# Patient Record
Sex: MALE | Race: WHITE | NOT HISPANIC OR LATINO | Employment: OTHER | URBAN - METROPOLITAN AREA
[De-identification: names, ages, dates, MRNs, and addresses within clinical notes are randomized per-mention and may not be internally consistent; named-entity substitution may affect disease eponyms.]

---

## 2021-05-15 ENCOUNTER — APPOINTMENT (OUTPATIENT)
Dept: GENERAL RADIOLOGY | Facility: CLINIC | Age: 71
DRG: 003 | End: 2021-05-15
Attending: STUDENT IN AN ORGANIZED HEALTH CARE EDUCATION/TRAINING PROGRAM
Payer: COMMERCIAL

## 2021-05-15 ENCOUNTER — APPOINTMENT (OUTPATIENT)
Dept: INTERVENTIONAL RADIOLOGY/VASCULAR | Facility: CLINIC | Age: 71
DRG: 003 | End: 2021-05-15
Attending: NEUROLOGICAL SURGERY
Payer: COMMERCIAL

## 2021-05-15 ENCOUNTER — APPOINTMENT (OUTPATIENT)
Dept: CT IMAGING | Facility: CLINIC | Age: 71
DRG: 003 | End: 2021-05-15
Attending: NEUROLOGICAL SURGERY
Payer: COMMERCIAL

## 2021-05-15 ENCOUNTER — ANESTHESIA EVENT (OUTPATIENT)
Dept: SURGERY | Facility: CLINIC | Age: 71
DRG: 003 | End: 2021-05-15
Payer: COMMERCIAL

## 2021-05-15 ENCOUNTER — APPOINTMENT (OUTPATIENT)
Dept: CT IMAGING | Facility: CLINIC | Age: 71
End: 2021-05-15
Attending: EMERGENCY MEDICINE
Payer: COMMERCIAL

## 2021-05-15 ENCOUNTER — ANESTHESIA (OUTPATIENT)
Dept: SURGERY | Facility: CLINIC | Age: 71
DRG: 003 | End: 2021-05-15
Payer: COMMERCIAL

## 2021-05-15 ENCOUNTER — HOSPITAL ENCOUNTER (EMERGENCY)
Facility: CLINIC | Age: 71
Discharge: ANOTHER HOSPITAL | End: 2021-05-15
Attending: EMERGENCY MEDICINE | Admitting: EMERGENCY MEDICINE
Payer: COMMERCIAL

## 2021-05-15 ENCOUNTER — APPOINTMENT (OUTPATIENT)
Dept: GENERAL RADIOLOGY | Facility: CLINIC | Age: 71
End: 2021-05-15
Attending: EMERGENCY MEDICINE
Payer: COMMERCIAL

## 2021-05-15 ENCOUNTER — HOSPITAL ENCOUNTER (INPATIENT)
Facility: CLINIC | Age: 71
LOS: 27 days | Discharge: NURSING, REHAB OR LONG-TERM CARE | DRG: 003 | End: 2021-06-11
Attending: NEUROLOGICAL SURGERY | Admitting: NEUROLOGICAL SURGERY
Payer: COMMERCIAL

## 2021-05-15 VITALS
BODY MASS INDEX: 22.63 KG/M2 | DIASTOLIC BLOOD PRESSURE: 77 MMHG | OXYGEN SATURATION: 100 % | RESPIRATION RATE: 30 BRPM | WEIGHT: 163.14 LBS | SYSTOLIC BLOOD PRESSURE: 122 MMHG | HEART RATE: 60 BPM

## 2021-05-15 DIAGNOSIS — I21.4 NSTEMI (NON-ST ELEVATED MYOCARDIAL INFARCTION) (H): ICD-10-CM

## 2021-05-15 DIAGNOSIS — R41.89 UNRESPONSIVENESS: ICD-10-CM

## 2021-05-15 DIAGNOSIS — I60.9 SUBARACHNOID HEMORRHAGE (H): ICD-10-CM

## 2021-05-15 DIAGNOSIS — E87.6 HYPOKALEMIA: ICD-10-CM

## 2021-05-15 DIAGNOSIS — I67.1 INTRACRANIAL ANEURYSM: ICD-10-CM

## 2021-05-15 DIAGNOSIS — Z99.11 VENTILATOR DEPENDENCE (H): Primary | ICD-10-CM

## 2021-05-15 DIAGNOSIS — I60.8 SUBARACHNOID HEMORRHAGE DUE TO RUPTURED ANEURYSM (H): ICD-10-CM

## 2021-05-15 DIAGNOSIS — Z99.11 VENTILATOR DEPENDENCE (H): ICD-10-CM

## 2021-05-15 DIAGNOSIS — I16.1 HYPERTENSIVE EMERGENCY: ICD-10-CM

## 2021-05-15 LAB
ABO + RH BLD: NORMAL
ABO + RH BLD: NORMAL
ALBUMIN SERPL-MCNC: 3 G/DL (ref 3.4–5)
ALBUMIN SERPL-MCNC: 3.7 G/DL (ref 3.4–5)
ALP SERPL-CCNC: 61 U/L (ref 40–150)
ALP SERPL-CCNC: 76 U/L (ref 40–150)
ALT SERPL W P-5'-P-CCNC: 16 U/L (ref 0–70)
ALT SERPL W P-5'-P-CCNC: 20 U/L (ref 0–70)
ANION GAP SERPL CALCULATED.3IONS-SCNC: 5 MMOL/L (ref 3–14)
ANION GAP SERPL CALCULATED.3IONS-SCNC: 6 MMOL/L (ref 3–14)
ANION GAP SERPL CALCULATED.3IONS-SCNC: 6 MMOL/L (ref 3–14)
ANION GAP SERPL CALCULATED.3IONS-SCNC: 8 MMOL/L (ref 3–14)
APTT PPP: 27 SEC (ref 22–37)
APTT PPP: 29 SEC (ref 22–37)
APTT PPP: 30 SEC (ref 22–37)
AST SERPL W P-5'-P-CCNC: 19 U/L (ref 0–45)
AST SERPL W P-5'-P-CCNC: 26 U/L (ref 0–45)
BASE DEFICIT BLDA-SCNC: 1.8 MMOL/L
BASE EXCESS BLDA CALC-SCNC: 0 MMOL/L
BASOPHILS # BLD AUTO: 0.1 10E9/L (ref 0–0.2)
BASOPHILS NFR BLD AUTO: 0.8 %
BILIRUB SERPL-MCNC: 0.2 MG/DL (ref 0.2–1.3)
BILIRUB SERPL-MCNC: 0.3 MG/DL (ref 0.2–1.3)
BLD GP AB SCN SERPL QL: NORMAL
BLD PROD TYP BPU: NORMAL
BLD UNIT ID BPU: 0
BLD UNIT ID BPU: 0
BLOOD BANK CMNT PATIENT-IMP: NORMAL
BLOOD PRODUCT CODE: NORMAL
BLOOD PRODUCT CODE: NORMAL
BPU ID: NORMAL
BPU ID: NORMAL
BUN SERPL-MCNC: 14 MG/DL (ref 7–30)
BUN SERPL-MCNC: 15 MG/DL (ref 7–30)
BUN SERPL-MCNC: 16 MG/DL (ref 7–30)
BUN SERPL-MCNC: 18 MG/DL (ref 7–30)
CA-I BLD-MCNC: 4.8 MG/DL (ref 4.4–5.2)
CALCIUM SERPL-MCNC: 7.4 MG/DL (ref 8.5–10.1)
CALCIUM SERPL-MCNC: 8.3 MG/DL (ref 8.5–10.1)
CALCIUM SERPL-MCNC: 8.8 MG/DL (ref 8.5–10.1)
CALCIUM SERPL-MCNC: 9.1 MG/DL (ref 8.5–10.1)
CHLORIDE SERPL-SCNC: 103 MMOL/L (ref 94–109)
CHLORIDE SERPL-SCNC: 104 MMOL/L (ref 94–109)
CHLORIDE SERPL-SCNC: 109 MMOL/L (ref 94–109)
CHLORIDE SERPL-SCNC: 113 MMOL/L (ref 94–109)
CO2 SERPL-SCNC: 22 MMOL/L (ref 20–32)
CO2 SERPL-SCNC: 24 MMOL/L (ref 20–32)
CO2 SERPL-SCNC: 27 MMOL/L (ref 20–32)
CO2 SERPL-SCNC: 28 MMOL/L (ref 20–32)
CREAT SERPL-MCNC: 0.78 MG/DL (ref 0.66–1.25)
CREAT SERPL-MCNC: 0.99 MG/DL (ref 0.66–1.25)
DIFFERENTIAL METHOD BLD: NORMAL
EOSINOPHIL # BLD AUTO: 0.2 10E9/L (ref 0–0.7)
EOSINOPHIL NFR BLD AUTO: 1.7 %
ERYTHROCYTE [DISTWIDTH] IN BLOOD BY AUTOMATED COUNT: 12 % (ref 10–15)
ERYTHROCYTE [DISTWIDTH] IN BLOOD BY AUTOMATED COUNT: 12.2 % (ref 10–15)
ERYTHROCYTE [DISTWIDTH] IN BLOOD BY AUTOMATED COUNT: 12.5 % (ref 10–15)
ETHANOL SERPL-MCNC: <0.01 G/DL
FIBRINOGEN PPP-MCNC: 417 MG/DL (ref 200–420)
GFR SERPL CREATININE-BSD FRML MDRD: 78 ML/MIN/{1.73_M2}
GFR SERPL CREATININE-BSD FRML MDRD: >90 ML/MIN/{1.73_M2}
GLUCOSE BLD-MCNC: 125 MG/DL (ref 70–99)
GLUCOSE BLDC GLUCOMTR-MCNC: 152 MG/DL (ref 70–99)
GLUCOSE SERPL-MCNC: 142 MG/DL (ref 70–99)
GLUCOSE SERPL-MCNC: 156 MG/DL (ref 70–99)
GLUCOSE SERPL-MCNC: 177 MG/DL (ref 70–99)
GLUCOSE SERPL-MCNC: 215 MG/DL (ref 70–99)
HCO3 BLD-SCNC: 23 MMOL/L (ref 21–28)
HCO3 BLD-SCNC: 23 MMOL/L (ref 21–28)
HCT VFR BLD AUTO: 35.5 % (ref 40–53)
HCT VFR BLD AUTO: 40 % (ref 40–53)
HCT VFR BLD AUTO: 43.5 % (ref 40–53)
HGB BLD-MCNC: 11.8 G/DL (ref 13.3–17.7)
HGB BLD-MCNC: 13.1 G/DL (ref 13.3–17.7)
HGB BLD-MCNC: 13.6 G/DL (ref 13.3–17.7)
HGB BLD-MCNC: 14.5 G/DL (ref 13.3–17.7)
IMM GRANULOCYTES # BLD: 0.2 10E9/L (ref 0–0.4)
IMM GRANULOCYTES NFR BLD: 1.7 %
INR PPP: 1.07 (ref 0.86–1.14)
INR PPP: 1.16 (ref 0.86–1.14)
INR PPP: 1.2 (ref 0.86–1.14)
INTERPRETATION ECG - MUSE: NORMAL
LABORATORY COMMENT REPORT: NORMAL
LACTATE BLD-SCNC: 1.6 MMOL/L (ref 0.7–2)
LDLC SERPL DIRECT ASSAY-MCNC: 125 MG/DL
LYMPHOCYTES # BLD AUTO: 3.1 10E9/L (ref 0.8–5.3)
LYMPHOCYTES NFR BLD AUTO: 31.6 %
MAGNESIUM SERPL-MCNC: 2 MG/DL (ref 1.6–2.3)
MCH RBC QN AUTO: 30.6 PG (ref 26.5–33)
MCH RBC QN AUTO: 30.7 PG (ref 26.5–33)
MCH RBC QN AUTO: 31.1 PG (ref 26.5–33)
MCHC RBC AUTO-ENTMCNC: 33.2 G/DL (ref 31.5–36.5)
MCHC RBC AUTO-ENTMCNC: 33.3 G/DL (ref 31.5–36.5)
MCHC RBC AUTO-ENTMCNC: 34 G/DL (ref 31.5–36.5)
MCV RBC AUTO: 90 FL (ref 78–100)
MCV RBC AUTO: 92 FL (ref 78–100)
MCV RBC AUTO: 93 FL (ref 78–100)
MONOCYTES # BLD AUTO: 0.5 10E9/L (ref 0–1.3)
MONOCYTES NFR BLD AUTO: 5.1 %
NEUTROPHILS # BLD AUTO: 5.8 10E9/L (ref 1.6–8.3)
NEUTROPHILS NFR BLD AUTO: 59.1 %
NRBC # BLD AUTO: 0 10*3/UL
NRBC BLD AUTO-RTO: 0 /100
NUM BPU REQUESTED: 6
O2/TOTAL GAS SETTING VFR VENT: 40 %
O2/TOTAL GAS SETTING VFR VENT: 60 %
OXYHGB MFR BLD: 98 % (ref 92–100)
PCO2 BLD: 33 MM HG (ref 35–45)
PCO2 BLD: 38 MM HG (ref 35–45)
PH BLD: 7.39 PH (ref 7.35–7.45)
PH BLD: 7.46 PH (ref 7.35–7.45)
PHOSPHATE SERPL-MCNC: 1.4 MG/DL (ref 2.5–4.5)
PLATELET # BLD AUTO: 239 10E9/L (ref 150–450)
PLATELET # BLD AUTO: 257 10E9/L (ref 150–450)
PLATELET # BLD AUTO: 285 10E9/L (ref 150–450)
PO2 BLD: 145 MM HG (ref 80–105)
PO2 BLD: 269 MM HG (ref 80–105)
POTASSIUM BLD-SCNC: 3.7 MMOL/L (ref 3.4–5.3)
POTASSIUM SERPL-SCNC: 3.2 MMOL/L (ref 3.4–5.3)
POTASSIUM SERPL-SCNC: 3.2 MMOL/L (ref 3.4–5.3)
POTASSIUM SERPL-SCNC: 3.8 MMOL/L (ref 3.4–5.3)
POTASSIUM SERPL-SCNC: 4.1 MMOL/L (ref 3.4–5.3)
PROT SERPL-MCNC: 6.2 G/DL (ref 6.8–8.8)
PROT SERPL-MCNC: 7.5 G/DL (ref 6.8–8.8)
RADIOLOGIST FLAGS: ABNORMAL
RBC # BLD AUTO: 3.85 10E12/L (ref 4.4–5.9)
RBC # BLD AUTO: 4.43 10E12/L (ref 4.4–5.9)
RBC # BLD AUTO: 4.66 10E12/L (ref 4.4–5.9)
SARS-COV-2 RNA RESP QL NAA+PROBE: NEGATIVE
SODIUM BLD-SCNC: 144 MMOL/L (ref 133–144)
SODIUM SERPL-SCNC: 131 MMOL/L (ref 133–144)
SODIUM SERPL-SCNC: 139 MMOL/L (ref 133–144)
SODIUM SERPL-SCNC: 140 MMOL/L (ref 133–144)
SODIUM SERPL-SCNC: 146 MMOL/L (ref 133–144)
SPECIMEN EXP DATE BLD: NORMAL
SPECIMEN SOURCE: NORMAL
T4 FREE SERPL-MCNC: 1.23 NG/DL (ref 0.76–1.46)
TRANSFUSION STATUS PATIENT QL: NORMAL
TROPONIN I SERPL-MCNC: 0.1 UG/L (ref 0–0.04)
TSH SERPL DL<=0.005 MIU/L-ACNC: 5.36 MU/L (ref 0.4–4)
WBC # BLD AUTO: 12.3 10E9/L (ref 4–11)
WBC # BLD AUTO: 16.5 10E9/L (ref 4–11)
WBC # BLD AUTO: 9.8 10E9/L (ref 4–11)

## 2021-05-15 PROCEDURE — 70450 CT HEAD/BRAIN W/O DYE: CPT | Mod: XE

## 2021-05-15 PROCEDURE — 82803 BLOOD GASES ANY COMBINATION: CPT | Performed by: STUDENT IN AN ORGANIZED HEALTH CARE EDUCATION/TRAINING PROGRAM

## 2021-05-15 PROCEDURE — 272N000001 HC OR GENERAL SUPPLY STERILE: Performed by: NEUROLOGICAL SURGERY

## 2021-05-15 PROCEDURE — 84100 ASSAY OF PHOSPHORUS: CPT | Performed by: STUDENT IN AN ORGANIZED HEALTH CARE EDUCATION/TRAINING PROGRAM

## 2021-05-15 PROCEDURE — 999N000185 HC STATISTIC TRANSPORT TIME EA 15 MIN

## 2021-05-15 PROCEDURE — 83721 ASSAY OF BLOOD LIPOPROTEIN: CPT | Performed by: STUDENT IN AN ORGANIZED HEALTH CARE EDUCATION/TRAINING PROGRAM

## 2021-05-15 PROCEDURE — 99291 CRITICAL CARE FIRST HOUR: CPT | Mod: 25

## 2021-05-15 PROCEDURE — 96365 THER/PROPH/DIAG IV INF INIT: CPT | Mod: 59

## 2021-05-15 PROCEDURE — 272N000566 HC SHEATH CR3

## 2021-05-15 PROCEDURE — 84439 ASSAY OF FREE THYROXINE: CPT | Performed by: STUDENT IN AN ORGANIZED HEALTH CARE EDUCATION/TRAINING PROGRAM

## 2021-05-15 PROCEDURE — 272N000506 HC NEEDLE CR6

## 2021-05-15 PROCEDURE — 36556 INSERT NON-TUNNEL CV CATH: CPT | Mod: GC | Performed by: PSYCHIATRY & NEUROLOGY

## 2021-05-15 PROCEDURE — 250N000024 HC ISOFLURANE, PER MIN: Performed by: NEUROLOGICAL SURGERY

## 2021-05-15 PROCEDURE — 74018 RADEX ABDOMEN 1 VIEW: CPT | Mod: 26 | Performed by: RADIOLOGY

## 2021-05-15 PROCEDURE — 96375 TX/PRO/DX INJ NEW DRUG ADDON: CPT | Mod: 59

## 2021-05-15 PROCEDURE — 99291 CRITICAL CARE FIRST HOUR: CPT | Mod: GC | Performed by: PSYCHIATRY & NEUROLOGY

## 2021-05-15 PROCEDURE — 999N000065 XR CHEST PORT 1 VIEW

## 2021-05-15 PROCEDURE — B3161ZZ FLUOROSCOPY OF RIGHT INTERNAL CAROTID ARTERY USING LOW OSMOLAR CONTRAST: ICD-10-PCS | Performed by: NEUROLOGICAL SURGERY

## 2021-05-15 PROCEDURE — 82803 BLOOD GASES ANY COMBINATION: CPT

## 2021-05-15 PROCEDURE — 009630Z DRAINAGE OF CEREBRAL VENTRICLE WITH DRAINAGE DEVICE, PERCUTANEOUS APPROACH: ICD-10-PCS | Performed by: NEUROLOGICAL SURGERY

## 2021-05-15 PROCEDURE — 36224 PLACE CATH CAROTD ART: CPT | Mod: RT | Performed by: NEUROLOGICAL SURGERY

## 2021-05-15 PROCEDURE — B3151ZZ FLUOROSCOPY OF BILATERAL COMMON CAROTID ARTERIES USING LOW OSMOLAR CONTRAST: ICD-10-PCS | Performed by: NEUROLOGICAL SURGERY

## 2021-05-15 PROCEDURE — 93005 ELECTROCARDIOGRAM TRACING: CPT

## 2021-05-15 PROCEDURE — 250N000009 HC RX 250: Performed by: NEUROLOGICAL SURGERY

## 2021-05-15 PROCEDURE — C1763 CONN TISS, NON-HUMAN: HCPCS | Performed by: NEUROLOGICAL SURGERY

## 2021-05-15 PROCEDURE — 36224 PLACE CATH CAROTD ART: CPT | Mod: 50

## 2021-05-15 PROCEDURE — C9803 HOPD COVID-19 SPEC COLLECT: HCPCS

## 2021-05-15 PROCEDURE — 82810 BLOOD GASES O2 SAT ONLY: CPT

## 2021-05-15 PROCEDURE — 84132 ASSAY OF SERUM POTASSIUM: CPT

## 2021-05-15 PROCEDURE — 93010 ELECTROCARDIOGRAM REPORT: CPT | Performed by: INTERNAL MEDICINE

## 2021-05-15 PROCEDURE — 999N000065 XR ABDOMEN 1 VIEW

## 2021-05-15 PROCEDURE — 86923 COMPATIBILITY TEST ELECTRIC: CPT | Performed by: STUDENT IN AN ORGANIZED HEALTH CARE EDUCATION/TRAINING PROGRAM

## 2021-05-15 PROCEDURE — 36223 PLACE CATH CAROTID/INOM ART: CPT | Mod: XS | Performed by: NEUROLOGICAL SURGERY

## 2021-05-15 PROCEDURE — 255N000002 HC RX 255 OP 636: Performed by: NEUROLOGICAL SURGERY

## 2021-05-15 PROCEDURE — 999N000157 HC STATISTIC RCP TIME EA 10 MIN

## 2021-05-15 PROCEDURE — 250N000009 HC RX 250: Performed by: STUDENT IN AN ORGANIZED HEALTH CARE EDUCATION/TRAINING PROGRAM

## 2021-05-15 PROCEDURE — P9016 RBC LEUKOCYTES REDUCED: HCPCS | Performed by: STUDENT IN AN ORGANIZED HEALTH CARE EDUCATION/TRAINING PROGRAM

## 2021-05-15 PROCEDURE — 250N000011 HC RX IP 250 OP 636: Performed by: STUDENT IN AN ORGANIZED HEALTH CARE EDUCATION/TRAINING PROGRAM

## 2021-05-15 PROCEDURE — C1769 GUIDE WIRE: HCPCS | Performed by: NEUROLOGICAL SURGERY

## 2021-05-15 PROCEDURE — B3131ZZ FLUOROSCOPY OF RIGHT COMMON CAROTID ARTERY USING LOW OSMOLAR CONTRAST: ICD-10-PCS | Performed by: NEUROLOGICAL SURGERY

## 2021-05-15 PROCEDURE — 99292 CRITICAL CARE ADDL 30 MIN: CPT | Mod: GC | Performed by: PSYCHIATRY & NEUROLOGY

## 2021-05-15 PROCEDURE — 272N000004 HC RX 272: Performed by: NEUROLOGICAL SURGERY

## 2021-05-15 PROCEDURE — 5A1945Z RESPIRATORY VENTILATION, 24-96 CONSECUTIVE HOURS: ICD-10-PCS | Performed by: NEUROLOGICAL SURGERY

## 2021-05-15 PROCEDURE — 250N000011 HC RX IP 250 OP 636: Performed by: NEUROLOGICAL SURGERY

## 2021-05-15 PROCEDURE — 85610 PROTHROMBIN TIME: CPT | Performed by: EMERGENCY MEDICINE

## 2021-05-15 PROCEDURE — C1769 GUIDE WIRE: HCPCS

## 2021-05-15 PROCEDURE — 36223 PLACE CATH CAROTID/INOM ART: CPT | Mod: XE | Performed by: NEUROLOGICAL SURGERY

## 2021-05-15 PROCEDURE — 51702 INSERT TEMP BLADDER CATH: CPT

## 2021-05-15 PROCEDURE — 85384 FIBRINOGEN ACTIVITY: CPT | Performed by: STUDENT IN AN ORGANIZED HEALTH CARE EDUCATION/TRAINING PROGRAM

## 2021-05-15 PROCEDURE — 84295 ASSAY OF SERUM SODIUM: CPT

## 2021-05-15 PROCEDURE — 80053 COMPREHEN METABOLIC PANEL: CPT | Performed by: STUDENT IN AN ORGANIZED HEALTH CARE EDUCATION/TRAINING PROGRAM

## 2021-05-15 PROCEDURE — 83036 HEMOGLOBIN GLYCOSYLATED A1C: CPT | Performed by: STUDENT IN AN ORGANIZED HEALTH CARE EDUCATION/TRAINING PROGRAM

## 2021-05-15 PROCEDURE — 71045 X-RAY EXAM CHEST 1 VIEW: CPT | Mod: 26 | Performed by: RADIOLOGY

## 2021-05-15 PROCEDURE — 360N000086 HC SURGERY LEVEL 6 W/ FLUORO, PER MIN: Performed by: NEUROLOGICAL SURGERY

## 2021-05-15 PROCEDURE — 82077 ASSAY SPEC XCP UR&BREATH IA: CPT | Performed by: EMERGENCY MEDICINE

## 2021-05-15 PROCEDURE — 70450 CT HEAD/BRAIN W/O DYE: CPT | Mod: XS

## 2021-05-15 PROCEDURE — 250N000009 HC RX 250

## 2021-05-15 PROCEDURE — 99292 CRITICAL CARE ADDL 30 MIN: CPT

## 2021-05-15 PROCEDURE — 85027 COMPLETE CBC AUTOMATED: CPT | Performed by: STUDENT IN AN ORGANIZED HEALTH CARE EDUCATION/TRAINING PROGRAM

## 2021-05-15 PROCEDURE — 94002 VENT MGMT INPAT INIT DAY: CPT

## 2021-05-15 PROCEDURE — 85730 THROMBOPLASTIN TIME PARTIAL: CPT | Performed by: STUDENT IN AN ORGANIZED HEALTH CARE EDUCATION/TRAINING PROGRAM

## 2021-05-15 PROCEDURE — 258N000003 HC RX IP 258 OP 636: Performed by: STUDENT IN AN ORGANIZED HEALTH CARE EDUCATION/TRAINING PROGRAM

## 2021-05-15 PROCEDURE — 03LG0CZ OCCLUSION OF INTRACRANIAL ARTERY WITH EXTRALUMINAL DEVICE, OPEN APPROACH: ICD-10-PCS | Performed by: NEUROLOGICAL SURGERY

## 2021-05-15 PROCEDURE — 36415 COLL VENOUS BLD VENIPUNCTURE: CPT | Performed by: STUDENT IN AN ORGANIZED HEALTH CARE EDUCATION/TRAINING PROGRAM

## 2021-05-15 PROCEDURE — C1887 CATHETER, GUIDING: HCPCS | Performed by: NEUROLOGICAL SURGERY

## 2021-05-15 PROCEDURE — 72125 CT NECK SPINE W/O DYE: CPT

## 2021-05-15 PROCEDURE — 250N000013 HC RX MED GY IP 250 OP 250 PS 637: Performed by: STUDENT IN AN ORGANIZED HEALTH CARE EDUCATION/TRAINING PROGRAM

## 2021-05-15 PROCEDURE — 278N000051 HC OR IMPLANT GENERAL: Performed by: NEUROLOGICAL SURGERY

## 2021-05-15 PROCEDURE — C1760 CLOSURE DEV, VASC: HCPCS

## 2021-05-15 PROCEDURE — 83605 ASSAY OF LACTIC ACID: CPT

## 2021-05-15 PROCEDURE — 84443 ASSAY THYROID STIM HORMONE: CPT | Performed by: STUDENT IN AN ORGANIZED HEALTH CARE EDUCATION/TRAINING PROGRAM

## 2021-05-15 PROCEDURE — C1762 CONN TISS, HUMAN(INC FASCIA): HCPCS | Performed by: NEUROLOGICAL SURGERY

## 2021-05-15 PROCEDURE — 258N000003 HC RX IP 258 OP 636

## 2021-05-15 PROCEDURE — 999N000083 HC STATISTIC IR STAFF TIME IN THE OR: Mod: XS

## 2021-05-15 PROCEDURE — C1713 ANCHOR/SCREW BN/BN,TIS/BN: HCPCS | Performed by: NEUROLOGICAL SURGERY

## 2021-05-15 PROCEDURE — 86901 BLOOD TYPING SEROLOGIC RH(D): CPT | Performed by: STUDENT IN AN ORGANIZED HEALTH CARE EDUCATION/TRAINING PROGRAM

## 2021-05-15 PROCEDURE — 36223 PLACE CATH CAROTID/INOM ART: CPT | Mod: LT

## 2021-05-15 PROCEDURE — 250N000011 HC RX IP 250 OP 636

## 2021-05-15 PROCEDURE — 999N001017 HC STATISTIC GLUCOSE BY METER IP

## 2021-05-15 PROCEDURE — 00C70ZZ EXTIRPATION OF MATTER FROM CEREBRAL HEMISPHERE, OPEN APPROACH: ICD-10-PCS | Performed by: NEUROLOGICAL SURGERY

## 2021-05-15 PROCEDURE — 70450 CT HEAD/BRAIN W/O DYE: CPT | Mod: 26 | Performed by: STUDENT IN AN ORGANIZED HEALTH CARE EDUCATION/TRAINING PROGRAM

## 2021-05-15 PROCEDURE — 85025 COMPLETE CBC W/AUTO DIFF WBC: CPT | Performed by: EMERGENCY MEDICINE

## 2021-05-15 PROCEDURE — 250N000011 HC RX IP 250 OP 636: Performed by: EMERGENCY MEDICINE

## 2021-05-15 PROCEDURE — 370N000017 HC ANESTHESIA TECHNICAL FEE, PER MIN: Performed by: NEUROLOGICAL SURGERY

## 2021-05-15 PROCEDURE — 96368 THER/DIAG CONCURRENT INF: CPT

## 2021-05-15 PROCEDURE — 86900 BLOOD TYPING SEROLOGIC ABO: CPT | Performed by: STUDENT IN AN ORGANIZED HEALTH CARE EDUCATION/TRAINING PROGRAM

## 2021-05-15 PROCEDURE — 200N000002 HC R&B ICU UMMC

## 2021-05-15 PROCEDURE — 86850 RBC ANTIBODY SCREEN: CPT | Performed by: STUDENT IN AN ORGANIZED HEALTH CARE EDUCATION/TRAINING PROGRAM

## 2021-05-15 PROCEDURE — 70496 CT ANGIOGRAPHY HEAD: CPT

## 2021-05-15 PROCEDURE — 87635 SARS-COV-2 COVID-19 AMP PRB: CPT | Performed by: EMERGENCY MEDICINE

## 2021-05-15 PROCEDURE — C1887 CATHETER, GUIDING: HCPCS

## 2021-05-15 PROCEDURE — 82947 ASSAY GLUCOSE BLOOD QUANT: CPT

## 2021-05-15 PROCEDURE — 99291 CRITICAL CARE FIRST HOUR: CPT | Mod: 25 | Performed by: PSYCHIATRY & NEUROLOGY

## 2021-05-15 PROCEDURE — 70450 CT HEAD/BRAIN W/O DYE: CPT | Mod: 76

## 2021-05-15 PROCEDURE — 250N000009 HC RX 250: Performed by: EMERGENCY MEDICINE

## 2021-05-15 PROCEDURE — 83735 ASSAY OF MAGNESIUM: CPT | Performed by: STUDENT IN AN ORGANIZED HEALTH CARE EDUCATION/TRAINING PROGRAM

## 2021-05-15 PROCEDURE — 258N000003 HC RX IP 258 OP 636: Performed by: EMERGENCY MEDICINE

## 2021-05-15 PROCEDURE — 31500 INSERT EMERGENCY AIRWAY: CPT

## 2021-05-15 PROCEDURE — 85610 PROTHROMBIN TIME: CPT | Performed by: STUDENT IN AN ORGANIZED HEALTH CARE EDUCATION/TRAINING PROGRAM

## 2021-05-15 PROCEDURE — 84484 ASSAY OF TROPONIN QUANT: CPT | Performed by: EMERGENCY MEDICINE

## 2021-05-15 PROCEDURE — 82330 ASSAY OF CALCIUM: CPT

## 2021-05-15 PROCEDURE — 85730 THROMBOPLASTIN TIME PARTIAL: CPT | Performed by: EMERGENCY MEDICINE

## 2021-05-15 PROCEDURE — 80048 BASIC METABOLIC PNL TOTAL CA: CPT | Performed by: EMERGENCY MEDICINE

## 2021-05-15 PROCEDURE — 80048 BASIC METABOLIC PNL TOTAL CA: CPT | Performed by: STUDENT IN AN ORGANIZED HEALTH CARE EDUCATION/TRAINING PROGRAM

## 2021-05-15 DEVICE — IMP BUR HOLE COVER 17MM LOW PROFILE TI 421.527: Type: IMPLANTABLE DEVICE | Site: CRANIAL | Status: FUNCTIONAL

## 2021-05-15 DEVICE — GRAFT DURAGEN 2X2" ID220: Type: IMPLANTABLE DEVICE | Site: BRAIN | Status: FUNCTIONAL

## 2021-05-15 DEVICE — IMPLANTABLE DEVICE: Type: IMPLANTABLE DEVICE | Site: BRAIN | Status: FUNCTIONAL

## 2021-05-15 DEVICE — IMP PLATE SYN BOX LOW PROFILE 10X16MM 04H TI 421.521: Type: IMPLANTABLE DEVICE | Site: CRANIAL | Status: FUNCTIONAL

## 2021-05-15 DEVICE — IMP BUR HOLE COVER 24MM LOW PROFILE TI 421.528: Type: IMPLANTABLE DEVICE | Site: CRANIAL | Status: FUNCTIONAL

## 2021-05-15 DEVICE — IMP SCR SYN MATRIX LOW PRO 1.5X04MM SELF DRILL 04.503.104.01: Type: IMPLANTABLE DEVICE | Site: CRANIAL | Status: FUNCTIONAL

## 2021-05-15 DEVICE — GRAFT DUREPAIR DURA MATRIX 3X3" 62105: Type: IMPLANTABLE DEVICE | Site: BRAIN | Status: FUNCTIONAL

## 2021-05-15 RX ORDER — NALOXONE HYDROCHLORIDE 0.4 MG/ML
0.4 INJECTION, SOLUTION INTRAMUSCULAR; INTRAVENOUS; SUBCUTANEOUS
Status: DISCONTINUED | OUTPATIENT
Start: 2021-05-15 | End: 2021-05-15 | Stop reason: HOSPADM

## 2021-05-15 RX ORDER — ONDANSETRON 4 MG/1
4 TABLET, ORALLY DISINTEGRATING ORAL EVERY 6 HOURS PRN
Status: DISCONTINUED | OUTPATIENT
Start: 2021-05-15 | End: 2021-05-16

## 2021-05-15 RX ORDER — LEVETIRACETAM 10 MG/ML
1000 INJECTION INTRAVASCULAR ONCE
Status: COMPLETED | OUTPATIENT
Start: 2021-05-15 | End: 2021-05-15

## 2021-05-15 RX ORDER — LORAZEPAM 2 MG/ML
INJECTION INTRAMUSCULAR
Status: DISCONTINUED
Start: 2021-05-15 | End: 2021-05-15 | Stop reason: HOSPADM

## 2021-05-15 RX ORDER — SODIUM CHLORIDE, SODIUM LACTATE, POTASSIUM CHLORIDE, CALCIUM CHLORIDE 600; 310; 30; 20 MG/100ML; MG/100ML; MG/100ML; MG/100ML
INJECTION, SOLUTION INTRAVENOUS CONTINUOUS PRN
Status: DISCONTINUED | OUTPATIENT
Start: 2021-05-15 | End: 2021-05-15

## 2021-05-15 RX ORDER — NALOXONE HYDROCHLORIDE 0.4 MG/ML
0.2 INJECTION, SOLUTION INTRAMUSCULAR; INTRAVENOUS; SUBCUTANEOUS
Status: DISCONTINUED | OUTPATIENT
Start: 2021-05-15 | End: 2021-05-15 | Stop reason: HOSPADM

## 2021-05-15 RX ORDER — MAGNESIUM HYDROXIDE 1200 MG/15ML
10 LIQUID ORAL
Status: DISCONTINUED | OUTPATIENT
Start: 2021-05-15 | End: 2021-05-15

## 2021-05-15 RX ORDER — HYDRALAZINE HYDROCHLORIDE 20 MG/ML
10 INJECTION INTRAMUSCULAR; INTRAVENOUS
Status: DISCONTINUED | OUTPATIENT
Start: 2021-05-15 | End: 2021-05-16

## 2021-05-15 RX ORDER — LABETALOL HYDROCHLORIDE 5 MG/ML
20 INJECTION, SOLUTION INTRAVENOUS
Status: DISCONTINUED | OUTPATIENT
Start: 2021-05-15 | End: 2021-05-16

## 2021-05-15 RX ORDER — HEPARIN SOD,PORCINE/0.9 % NACL 5K/1000 ML
INTRAVENOUS SOLUTION INTRAVENOUS PRN
Status: DISCONTINUED | OUTPATIENT
Start: 2021-05-15 | End: 2021-05-15 | Stop reason: HOSPADM

## 2021-05-15 RX ORDER — MAGNESIUM HYDROXIDE 1200 MG/15ML
10 LIQUID ORAL
Status: DISCONTINUED | OUTPATIENT
Start: 2021-05-15 | End: 2021-06-05

## 2021-05-15 RX ORDER — FENTANYL CITRATE 50 UG/ML
INJECTION, SOLUTION INTRAMUSCULAR; INTRAVENOUS PRN
Status: DISCONTINUED | OUTPATIENT
Start: 2021-05-15 | End: 2021-05-15

## 2021-05-15 RX ORDER — PROPOFOL 10 MG/ML
5-75 INJECTION, EMULSION INTRAVENOUS CONTINUOUS
Status: DISCONTINUED | OUTPATIENT
Start: 2021-05-15 | End: 2021-05-16

## 2021-05-15 RX ORDER — BUPIVACAINE HYDROCHLORIDE AND EPINEPHRINE 2.5; 5 MG/ML; UG/ML
INJECTION, SOLUTION EPIDURAL; INFILTRATION; INTRACAUDAL; PERINEURAL PRN
Status: DISCONTINUED | OUTPATIENT
Start: 2021-05-15 | End: 2021-05-15 | Stop reason: HOSPADM

## 2021-05-15 RX ORDER — IOPAMIDOL 755 MG/ML
70 INJECTION, SOLUTION INTRAVASCULAR ONCE
Status: COMPLETED | OUTPATIENT
Start: 2021-05-15 | End: 2021-05-15

## 2021-05-15 RX ORDER — ONDANSETRON 2 MG/ML
4 INJECTION INTRAMUSCULAR; INTRAVENOUS EVERY 6 HOURS PRN
Status: DISCONTINUED | OUTPATIENT
Start: 2021-05-15 | End: 2021-05-16

## 2021-05-15 RX ORDER — NIMODIPINE 30 MG/1
60 CAPSULE, LIQUID FILLED ORAL EVERY 4 HOURS
Status: DISCONTINUED | OUTPATIENT
Start: 2021-05-15 | End: 2021-05-15

## 2021-05-15 RX ORDER — MANNITOL 20 G/100ML
400 INJECTION, SOLUTION INTRAVENOUS ONCE
Status: DISCONTINUED | OUTPATIENT
Start: 2021-05-15 | End: 2021-05-15

## 2021-05-15 RX ORDER — POTASSIUM CHLORIDE 7.45 MG/ML
10 INJECTION INTRAVENOUS ONCE
Status: DISCONTINUED | OUTPATIENT
Start: 2021-05-15 | End: 2021-05-15 | Stop reason: HOSPADM

## 2021-05-15 RX ORDER — LABETALOL HYDROCHLORIDE 5 MG/ML
20 INJECTION, SOLUTION INTRAVENOUS ONCE
Status: COMPLETED | OUTPATIENT
Start: 2021-05-15 | End: 2021-05-15

## 2021-05-15 RX ORDER — FLUMAZENIL 0.1 MG/ML
0.2 INJECTION, SOLUTION INTRAVENOUS
Status: DISCONTINUED | OUTPATIENT
Start: 2021-05-15 | End: 2021-05-15 | Stop reason: HOSPADM

## 2021-05-15 RX ORDER — IODIXANOL 320 MG/ML
INJECTION, SOLUTION INTRAVASCULAR PRN
Status: DISCONTINUED | OUTPATIENT
Start: 2021-05-15 | End: 2021-05-15 | Stop reason: HOSPADM

## 2021-05-15 RX ORDER — PROPOFOL 10 MG/ML
100 INJECTION, EMULSION INTRAVENOUS ONCE
Status: COMPLETED | OUTPATIENT
Start: 2021-05-15 | End: 2021-05-15

## 2021-05-15 RX ORDER — LEVETIRACETAM 100 MG/ML
2000 SOLUTION ORAL ONCE
Status: DISCONTINUED | OUTPATIENT
Start: 2021-05-15 | End: 2021-05-15

## 2021-05-15 RX ORDER — ONDANSETRON 2 MG/ML
4 INJECTION INTRAMUSCULAR; INTRAVENOUS
Status: DISCONTINUED | OUTPATIENT
Start: 2021-05-15 | End: 2021-05-15 | Stop reason: HOSPADM

## 2021-05-15 RX ORDER — FENTANYL CITRATE 50 UG/ML
25-50 INJECTION, SOLUTION INTRAMUSCULAR; INTRAVENOUS EVERY 5 MIN PRN
Status: DISCONTINUED | OUTPATIENT
Start: 2021-05-15 | End: 2021-05-15 | Stop reason: HOSPADM

## 2021-05-15 RX ORDER — IODIXANOL 320 MG/ML
150 INJECTION, SOLUTION INTRAVASCULAR ONCE
Status: COMPLETED | OUTPATIENT
Start: 2021-05-15 | End: 2021-05-15

## 2021-05-15 RX ORDER — PROPOFOL 10 MG/ML
40 INJECTION, EMULSION INTRAVENOUS CONTINUOUS
Status: DISCONTINUED | OUTPATIENT
Start: 2021-05-15 | End: 2021-05-15 | Stop reason: HOSPADM

## 2021-05-15 RX ORDER — PROPOFOL 10 MG/ML
INJECTION, EMULSION INTRAVENOUS PRN
Status: DISCONTINUED | OUTPATIENT
Start: 2021-05-15 | End: 2021-05-15

## 2021-05-15 RX ORDER — LEVETIRACETAM 5 MG/ML
500 INJECTION INTRAVASCULAR EVERY 12 HOURS
Status: DISCONTINUED | OUTPATIENT
Start: 2021-05-15 | End: 2021-05-15

## 2021-05-15 RX ORDER — NIMODIPINE 30 MG/1
30 CAPSULE, LIQUID FILLED ORAL
Status: DISCONTINUED | OUTPATIENT
Start: 2021-05-15 | End: 2021-05-15

## 2021-05-15 RX ORDER — MANNITOL 20 G/100ML
100 INJECTION, SOLUTION INTRAVENOUS ONCE
Status: COMPLETED | OUTPATIENT
Start: 2021-05-15 | End: 2021-05-15

## 2021-05-15 RX ORDER — CEFAZOLIN SODIUM 1 G/3ML
INJECTION, POWDER, FOR SOLUTION INTRAMUSCULAR; INTRAVENOUS PRN
Status: DISCONTINUED | OUTPATIENT
Start: 2021-05-15 | End: 2021-05-15

## 2021-05-15 RX ORDER — HYDRALAZINE HYDROCHLORIDE 20 MG/ML
20 INJECTION INTRAMUSCULAR; INTRAVENOUS ONCE
Status: COMPLETED | OUTPATIENT
Start: 2021-05-15 | End: 2021-05-15

## 2021-05-15 RX ORDER — HYDRALAZINE HYDROCHLORIDE 20 MG/ML
10 INJECTION INTRAMUSCULAR; INTRAVENOUS ONCE
Status: DISCONTINUED | OUTPATIENT
Start: 2021-05-15 | End: 2021-05-15

## 2021-05-15 RX ORDER — HEPARIN SODIUM 200 [USP'U]/100ML
1 INJECTION, SOLUTION INTRAVENOUS CONTINUOUS PRN
Status: DISCONTINUED | OUTPATIENT
Start: 2021-05-15 | End: 2021-05-15 | Stop reason: HOSPADM

## 2021-05-15 RX ORDER — SODIUM CHLORIDE, SODIUM GLUCONATE, SODIUM ACETATE, POTASSIUM CHLORIDE AND MAGNESIUM CHLORIDE 526; 502; 368; 37; 30 MG/100ML; MG/100ML; MG/100ML; MG/100ML; MG/100ML
50 INJECTION, SOLUTION INTRAVENOUS CONTINUOUS
Status: DISCONTINUED | OUTPATIENT
Start: 2021-05-15 | End: 2021-05-18

## 2021-05-15 RX ORDER — NICARDIPINE HYDROCHLORIDE 0.2 MG/ML
2.5-15 INJECTION INTRAVENOUS CONTINUOUS
Status: DISCONTINUED | OUTPATIENT
Start: 2021-05-15 | End: 2021-05-15 | Stop reason: HOSPADM

## 2021-05-15 RX ORDER — SODIUM CHLORIDE 9 MG/ML
INJECTION, SOLUTION INTRAVENOUS CONTINUOUS
Status: DISCONTINUED | OUTPATIENT
Start: 2021-05-15 | End: 2021-05-15

## 2021-05-15 RX ADMIN — IODIXANOL 40 ML: 320 INJECTION, SOLUTION INTRAVASCULAR at 15:02

## 2021-05-15 RX ADMIN — LIDOCAINE HYDROCHLORIDE 4 ML: 10 INJECTION, SOLUTION EPIDURAL; INFILTRATION; INTRACAUDAL; PERINEURAL at 14:25

## 2021-05-15 RX ADMIN — SODIUM CHLORIDE 1000 ML: 9 INJECTION, SOLUTION INTRAVENOUS at 09:15

## 2021-05-15 RX ADMIN — SODIUM CHLORIDE, POTASSIUM CHLORIDE, SODIUM LACTATE AND CALCIUM CHLORIDE: 600; 310; 30; 20 INJECTION, SOLUTION INTRAVENOUS at 15:04

## 2021-05-15 RX ADMIN — LEVETIRACETAM 1000 MG: 10 INJECTION INTRAVENOUS at 09:30

## 2021-05-15 RX ADMIN — CEFAZOLIN 1 G: 1 INJECTION, POWDER, FOR SOLUTION INTRAMUSCULAR; INTRAVENOUS at 17:05

## 2021-05-15 RX ADMIN — PROPOFOL 20 MCG/KG/MIN: 10 INJECTION, EMULSION INTRAVENOUS at 22:53

## 2021-05-15 RX ADMIN — SUGAMMADEX 200 MG: 100 INJECTION, SOLUTION INTRAVENOUS at 20:44

## 2021-05-15 RX ADMIN — PHENYLEPHRINE HYDROCHLORIDE 200 MCG: 10 INJECTION INTRAVENOUS at 15:40

## 2021-05-15 RX ADMIN — PROPOFOL 50 MG: 10 INJECTION, EMULSION INTRAVENOUS at 15:04

## 2021-05-15 RX ADMIN — FENTANYL CITRATE 50 MCG: 50 INJECTION, SOLUTION INTRAMUSCULAR; INTRAVENOUS at 18:26

## 2021-05-15 RX ADMIN — PROPOFOL 40 MCG/KG/MIN: 10 INJECTION, EMULSION INTRAVENOUS at 09:20

## 2021-05-15 RX ADMIN — FENTANYL CITRATE 150 MCG: 50 INJECTION, SOLUTION INTRAMUSCULAR; INTRAVENOUS at 18:52

## 2021-05-15 RX ADMIN — FENTANYL CITRATE 100 MCG: 50 INJECTION, SOLUTION INTRAMUSCULAR; INTRAVENOUS at 16:34

## 2021-05-15 RX ADMIN — ROCURONIUM BROMIDE 20 MG: 10 INJECTION INTRAVENOUS at 17:08

## 2021-05-15 RX ADMIN — ROCURONIUM BROMIDE 20 MG: 10 INJECTION INTRAVENOUS at 17:35

## 2021-05-15 RX ADMIN — FENTANYL CITRATE 50 MCG: 50 INJECTION, SOLUTION INTRAMUSCULAR; INTRAVENOUS at 15:22

## 2021-05-15 RX ADMIN — PHENYLEPHRINE HYDROCHLORIDE 50 MCG: 10 INJECTION INTRAVENOUS at 16:04

## 2021-05-15 RX ADMIN — SODIUM CHLORIDE, SODIUM GLUCONATE, SODIUM ACETATE, POTASSIUM CHLORIDE AND MAGNESIUM CHLORIDE 75 ML/HR: 526; 502; 368; 37; 30 INJECTION, SOLUTION INTRAVENOUS at 12:56

## 2021-05-15 RX ADMIN — FENTANYL CITRATE 50 MCG: 50 INJECTION, SOLUTION INTRAMUSCULAR; INTRAVENOUS at 18:28

## 2021-05-15 RX ADMIN — NIMODIPINE 60 MG: 30 SOLUTION ORAL at 21:53

## 2021-05-15 RX ADMIN — PHENYLEPHRINE HYDROCHLORIDE 100 MCG: 10 INJECTION INTRAVENOUS at 16:49

## 2021-05-15 RX ADMIN — MANNITOL 100 G: 20 INJECTION, SOLUTION INTRAVENOUS at 09:26

## 2021-05-15 RX ADMIN — LEVETIRACETAM 1000 MG: 10 INJECTION INTRAVENOUS at 16:20

## 2021-05-15 RX ADMIN — PROPOFOL 100 MG/HR: 10 INJECTION, EMULSION INTRAVENOUS at 09:16

## 2021-05-15 RX ADMIN — NICARDIPINE HYDROCHLORIDE 10 MG/HR: 0.2 INJECTION INTRAVENOUS at 10:06

## 2021-05-15 RX ADMIN — PHENYLEPHRINE HYDROCHLORIDE 50 MCG: 10 INJECTION INTRAVENOUS at 18:57

## 2021-05-15 RX ADMIN — PROPOFOL 50 MG: 10 INJECTION, EMULSION INTRAVENOUS at 15:27

## 2021-05-15 RX ADMIN — ROCURONIUM BROMIDE 20 MG: 10 INJECTION INTRAVENOUS at 19:50

## 2021-05-15 RX ADMIN — LABETALOL HYDROCHLORIDE 20 MG: 5 INJECTION, SOLUTION INTRAVENOUS at 09:26

## 2021-05-15 RX ADMIN — IOPAMIDOL 70 ML: 755 INJECTION, SOLUTION INTRAVENOUS at 09:42

## 2021-05-15 RX ADMIN — ROCURONIUM BROMIDE 20 MG: 10 INJECTION INTRAVENOUS at 15:56

## 2021-05-15 RX ADMIN — PHENYLEPHRINE HYDROCHLORIDE 100 MCG: 10 INJECTION INTRAVENOUS at 19:03

## 2021-05-15 RX ADMIN — CEFAZOLIN 1 G: 1 INJECTION, POWDER, FOR SOLUTION INTRAMUSCULAR; INTRAVENOUS at 19:31

## 2021-05-15 RX ADMIN — SODIUM CHLORIDE, POTASSIUM CHLORIDE, SODIUM LACTATE AND CALCIUM CHLORIDE: 600; 310; 30; 20 INJECTION, SOLUTION INTRAVENOUS at 19:38

## 2021-05-15 RX ADMIN — PHENYLEPHRINE HYDROCHLORIDE 100 MCG: 10 INJECTION INTRAVENOUS at 16:41

## 2021-05-15 RX ADMIN — Medication 10 ML: at 21:54

## 2021-05-15 RX ADMIN — SODIUM CHLORIDE 1000 ML: 9 INJECTION, SOLUTION INTRAVENOUS at 10:13

## 2021-05-15 RX ADMIN — HEPARIN SODIUM 4 BAG: 200 INJECTION, SOLUTION INTRAVENOUS at 14:24

## 2021-05-15 RX ADMIN — CEFAZOLIN 2 G: 1 INJECTION, POWDER, FOR SOLUTION INTRAMUSCULAR; INTRAVENOUS at 15:14

## 2021-05-15 RX ADMIN — PHENYLEPHRINE HYDROCHLORIDE 100 MCG: 10 INJECTION INTRAVENOUS at 19:08

## 2021-05-15 RX ADMIN — ROCURONIUM BROMIDE 50 MG: 10 INJECTION INTRAVENOUS at 15:04

## 2021-05-15 RX ADMIN — FENTANYL CITRATE 50 MCG: 50 INJECTION, SOLUTION INTRAMUSCULAR; INTRAVENOUS at 15:04

## 2021-05-15 RX ADMIN — HYDRALAZINE HYDROCHLORIDE 20 MG: 20 INJECTION INTRAMUSCULAR; INTRAVENOUS at 10:10

## 2021-05-15 RX ADMIN — PHENYLEPHRINE HYDROCHLORIDE 50 MCG: 10 INJECTION INTRAVENOUS at 16:06

## 2021-05-15 RX ADMIN — PROPOFOL 40 MCG/KG/MIN: 10 INJECTION, EMULSION INTRAVENOUS at 12:57

## 2021-05-15 RX ADMIN — ROCURONIUM BROMIDE 20 MG: 10 INJECTION INTRAVENOUS at 16:36

## 2021-05-15 RX ADMIN — Medication 50 MCG/HR: at 12:58

## 2021-05-15 RX ADMIN — FENTANYL CITRATE 50 MCG: 50 INJECTION, SOLUTION INTRAMUSCULAR; INTRAVENOUS at 15:27

## 2021-05-15 ASSESSMENT — ENCOUNTER SYMPTOMS: SEIZURES: 1

## 2021-05-15 ASSESSMENT — VISUAL ACUITY
OU: NOT TESTABLE

## 2021-05-15 ASSESSMENT — MIFFLIN-ST. JEOR: SCORE: 1482.13

## 2021-05-15 NOTE — PROCEDURES
[facility]     Endovascular Surgical Neuroradiology Post-Procedure Note    Pre-Procedure Diagnosis:  SAH, ruptured aneurysm  Post-Procedure Diagnosis:  same    Procedure(s):   Diagnostic cervicocerebral angiography    Findings:      Confirmation of rutpured pcomm aneurysm    Plan:      - to OR    Primary Surgeon:  Dr. Jamin Martinez  Fellow:  Riky  Additional Assistants:  Karen    Prior to the start of the procedure and with procedural staff participation, I verbally confirmed: the patient s identity using two indicators, relevant allergies, that the procedure was appropriate and matched the consent or emergent situation, and that the correct equipment/implants were available. Immediately prior to starting the procedure I conducted the Time Out with the procedural staff and re-confirmed the patient s name, procedure, and site/side. (The Joint Commission universal protocol was followed.)  Yes    PRU value: Not applicable    Anesthesia:  Conscious Sedation  Medications:  Fentanyl, Midazolam  Puncture site:  Right Femoral Artery    Fluoroscopy time (minutes):  14.5  Radiation dose (mGy):  442  Contrast amount (mL): 40    Estimated blood loss (mL):  minimal    Closure: retained sheath    Disposition:  neurosurgery      Sedation Post-Procedure Summary    Sedatives: fentanyl propofol continued drips    Vital signs and pulse oximetry were monitored and remained stable throughout the procedure, and sedation was maintained until the procedure was complete.  The patient was monitored by staff until sedation discharge criteria were met.    Patient tolerance:  Patient tolerated the procedure well with no immediate complications.    Time of sedation in minutes:33 minutes from beginning to end of physician one to one monitoring.    Katelyn Gómez MD  Pager:  [phone]

## 2021-05-15 NOTE — PROGRESS NOTES
[facility]     Endovascular Surgical Neuroradiology Pre-Procedure Note      HPI:  Dayron Neri is a 70 year old male with PMH of HTN transferred to [facility] after he initially presented to [facility] on 5/15/2021 after being found unresponsive by his wife.  EMS initially recorded systolic blood pressures in 280s mmHg, and the patient was witnessed to have seizures on route to the hospital.  On arrival at the outside ER, he was noted to have pupillary asymmetry, with the right pupil 3 mm and fixed on the left to being 2 mm and reactive.  The patient was intubated for airway protection and was treated for suspected high ICP (mannitol 100 g x2, hyperventilated, HOB elevated).  Subsequently, his pupils were noted to have improved, recommend 2 mm and reactive bilaterally.  A subsequent head CT showed a right communicating segment multilobulated ICA aneurysm, distal to the origin of the posterior communicating artery.  The patient was transferred to [facility] for further management.       Medical History:  Reviewed    Surgical History:  Reviewed    Family History:  Reviewed    Social History:  Reviewed    Allergies:  Reviewed    Is there a contrast allergy?  No    Medications:  I have reviewed this patient's current medications.    ROS:  Review of systems not obtained due to patient factors - intubation    PHYSICAL EXAMINATION  Vital Signs:  B/P: 118/80,  T: 94.6,  P: 82,  R: 18    Cardio:  RRR  Pulmonary:  on mechanical ventilation  Abdomen:  soft, non-distended    Neurologic  Mental Status:  Unable to assess, intubated  Cranial Nerves:  Pupils are bilaterally 2 mm sluggishly reactive.  Motor:  no abnormal movements, normal tone throughout, normal muscle bulk  Sensory:  Unable to assess, intubated/sedated  Coordination: Deferred    Pre-procedure National Institutes of Health Stroke Scale:   Not applicable    LABS  (most recent Cr, BUN, GFR, PLT, INR, PTT within the past 7  days):  Recent Labs   Lab 05/15/21  1222 05/15/21  0930   CR PENDING  --    BUN PENDING  --    GFRESTIMATED PENDING  --    GFRESTBLACK PENDING  --      --    INR  --  1.07   PTT  --  27        Platelet Function P2Y12 (PRU):  Not applicable      ASSESSMENT: Ruptured right communicating segment ICA aneurysm    PLAN: Diagnostic cerebral angiogram to assess possible endovascular/surgical treatment options        PRE-PROCEDURE SEDATION ASSESSMENT     Pre-Procedure Sedation Assessment done at 1200.    Expected Level:  Deep Sedation    Indication:  Sedation is required to allow for neurointerventional procedure.    Consent obtained from spouse after discussing the risks, benefits and alternatives.     PO Intake:  Appropriately NPO for procedure    ASA Class:  Class 3 - SEVERE SYSTEMIC DISEASE, DEFINITE FUNCTIONAL LIMITATIONS.    Mallampati: Already Intubated   History and physical reviewed and no updates needed. I have reviewed the lab findings, diagnostic data, medications, and the plan for sedation. I have determined this patient to be an appropriate candidate for the planned sedation and procedure and have reassessed the patient IMMEDIATELY PRIOR to sedation and procedure.    Patient was discussed with the Attending, Dr. Martinez, who agrees with the plan.    JULIO CÉSAR MOMIN MD

## 2021-05-15 NOTE — PRE-PROCEDURE
Neurosurgery Attending Preop Note:    Planned Procedure: Right craniotomy, clipping of PCOM aneurysm, evacuation of intracerebral hematoma, intraoperative angiogram.    Indication: Dayron is a 70 y.o. who presented to [facility] with an acute SAH and right ICH.  H/H grade 5.  On presentation his right pupil was blown.  This responded to hyperventillation and mannitol.  A CTA demonstrated a right PCOM aneurysm which is likely the source of the hemorrhage.  He was immediately transferred to the [facility] for emergent ventriculostomy.      I have reviewed the films myself and with my partner Dr. Maurer.  We both agree that the source of the hemorrhage is the right PCOM aneurysm.  Because of the hematoma we feel that the best treatment option is a craniotomy, evacuation of the hematoma and clipping of the aneurysm.  To better appreciate the anatomy of the aneurysm prior to clipping we will perform a cerebral angiogram and then take him directly to the operating room.    Discussion of risks and benefits.  I have personally met Dayron's wife and have described the situation to her.  She understands that this is a life threatening situation and he may not survive this hospitalization.  I described to her the SAH and ICH and then discussed treatment options including coiling vs craniotomy and clipping.  I have discussed risks and benefits of both of these.  She is in agreement with our plan for craniotomy and clipping of aneurysm.  Regarding this procedure I have discussed with her the risks including stroke, additional hemorrhage, paralysis, loss of speech, vision and death.  She is aware of these risks and has signed an informed consent.

## 2021-05-15 NOTE — PROGRESS NOTES
"SPIRITUAL HEALTH SERVICES  [facility] 4A  ON-CALL VISIT     REFERRAL SOURCE: Initial on-call  visit with patient's spouse at bedside, per request for hospital  visit as noted in initial nursing assessment.     Pt was about to go to IR emergently, then will go directly to OR for surgery. Pt's spouse requested Sikh anointing for pt; I informed her that   is on-call but not in-house, she said \"tomorrow would be fine\". Spouse requested prayer before pt left room - we shared prayer and blessing together.     PLAN: will refer to   for anointing on Sunday.     Loi Wise M.Div. (Bill), [facility]  Staff   Pager [phone]     * [facility] remains available 24/7 for emergent requests/referrals, either by having the switchboard page the on-call  or by entering an ASAP/STAT consult in Epic (this will also page the on-call ). Routine Epic consults receive an initial response within 24 hours.*                                                                                          "

## 2021-05-15 NOTE — PROCEDURES
Neurosurgery Ventriculostomy Procedure Note  Date of Procedure: 05/15/2021  Pre procedure Diagnosis: SAH with IVH  Post procedure Diagnosis: SAH with IVH  Consent: Emergent   Anesthesia: Sedation: Propofol (patient already intubated)  Time Out Performed: Yes  Procedure: Left sided External ventricular drain Placement.  Nursing staff entered this procedure into the [facility] Quality Improvement Database    Details of the Procedure:  - Bed was positioned for unencumbered access for sterile procedure  - Patient was supine with head in the neutral position. Left side of the head was widely shaved using clippers.  - All staff wore face masks and hats.  - The patient was on propofol gtt  - Antibiotics were given pre-procedure.  - Anatomical landmarks were used for defining the trajectory and entry point for the ventriculostomy. The incision was marked 11 cm behind the nasion and 3 cm away from the midline.  - A hibiclenz scrub was performed  - The site was then prepped and draped in the standard sterile fashion with full body draping.  - Using a # 15 blade a 1-2 cm incision was made.  - A hand drill was used to make a kwame hole.  - A bactiseal ventricular catheter with a stylet was passed aiming towards the ipsilateral medial canthus and midway between the ipsilateral tragus and lateral canthus.  - When the loss of resistance was felt as the catheter entered the ventricle and CSF was obtained, the stylet was withdrawn and the catheter was furthered another cm. CSF came out under pressure. A cap was applied and opening pressure was obtained.  - A tunneler was passed from the incision >5 cm behind and the catheter was attached to the blunt end and tunneled out a distance from the incision.   - The catheter was then attached to the [place] drainage system and connected to the monitor. Opening pressure was 15 mm Hg.  - Incision was closed using 3/0 Nylon.  - The drain was secured at the exit site using 3-0 nylon suture.  - Drain was  secured using staples.  - Bioseal disk was placed at the exit site of the catheter  - The site was covered with sterile primapore dressing  - Patient tolerated the procedure well.  - The drain was leveled and set at 20 cmH2O.    Monitoring of CSF will continue with samples collected every 5 days.    Dr. Martinez was immediately available for the procedure.    Levi Ferriswajacob Cleaning

## 2021-05-15 NOTE — CONSULTS
[facility]  NeuroCritical Care Consult Note    Patient Name:  Dayron Neri  MRN:  [id number]    :  1950  Date of Service:  05/15/2021  Primary care provider:  Carolyn Moe      Reason for Consult: Asked by Dr. Martinez to see Dayron Neri for Neurocritical care co-management    History of Present Illness:   Dayron Neri is a 70 year old man with PMH of HTN and depression who presents for aneurysmal subarachnoid hemorrhage with significant AMS.  Neuro critical care was consulted for critical care comanagement.    Patient is unable to give a history due to significant encephalopathy.  History is obtained from chart review and EMS.    Reportedly, at 0800 today (5/15/2021) patient's wife heard him fall in the bathroom and found him unresponsive on the ground.  After a few minutes of trying to wake him up she called 911 and EMS.  On arrival to outside hospital he was found to have SBP greater than 200 with a fixed and dilated right pupil.  He was given mannitol and hypertonic's at this time and pupil became equal.  CT scan was performed which showed profound subarachnoid hemorrhage with some intraparenchymal hemorrhage of right frontal lobe.  He was transferred to [facility] for DSA, neurosurgery, and further interventions.      ROS: A 10-point ROS was performed as per HPI.    PMH:  Past Medical History:   Diagnosis Date     Displacement of lumbar intervertebral disc without myelopathy     Lumbar disc rupture, no surgery     Past Surgical History:   Procedure Laterality Date     NO HISTORY OF SURGERY         Allergies:  No Known Allergies    Medications:      Current Facility-Administered Medications:      bupivacaine 0.25 % - EPINEPHrine 1:200,000 (PF) injection, , , PRN, Jamin Martinez MD, 20 mL at 05/15/21 1515     [Auto Hold] fentaNYL (SUBLIMAZE) 50 mcg/mL bolus from infusion pump 25 mcg, 25 mcg, Intravenous, Q1H PRN, Adrian Paredes MD     fentaNYL  (SUBLIMAZE) infusion,  mcg/hr, Intravenous, Continuous, Adrian Paredes MD, Last Rate: 1 mL/hr at 05/15/21 1258, 50 mcg/hr at 05/15/21 1258     gelatin adsorbable sponge, , , PRN, Jamin Martinez MD, 1 each at 05/15/21 1650     hemostatic matrix (SURGIFLO) kit, , , PRN, Jamin Martinez MD, 1 kit at 05/15/21 1649     [Auto Hold] hydrALAZINE (APRESOLINE) injection 10 mg, 10 mg, Intravenous, Q1H PRN, Adrian Paredes MD     [Auto Hold] labetalol (NORMODYNE/TRANDATE) injection 20 mg, 20 mg, Intravenous, Q1H PRN, Adrian Paredes MD     niCARdipine 40 mg in 200 mL 0.83% NaCl (CARDENE) infusion, 2.5-15 mg/hr, Intravenous, Continuous, Adrian Paredes MD     [Auto Hold] niMODipine (NYMALIZE) 6 MG/ML solution 60 mg, 60 mg, Oral or Feeding Tube, Q4H MARAH **AND** [Auto Hold] NS oral flush for nimodipine, 10 mL, Oral or Feeding Tube, Q4H MARAH, Adrian Paredes MD     Plasma-Lyte A (electrolyte A) solution, 75 mL/hr, Intravenous, Continuous, Adrian Paredes MD, Last Rate: 75 mL/hr at 05/15/21 1256, 75 mL/hr at 05/15/21 1256     propofol (DIPRIVAN) infusion, 5-75 mcg/kg/min, Intravenous, Continuous, Adrian Paredes MD, Last Rate: 26.6 mL/hr at 05/15/21 1410, 60 mcg/kg/min at 05/15/21 1410     thrombin (Recombinant) 5000 units vial, , , PRN, Jamin Martinez MD, 5,000 Units at 05/15/21 1650    Facility-Administered Medications Ordered in Other Encounters:      ceFAZolin (ANCEF) 1 g vial to attach to  ml bag for ADULT or 50 ml bag for PEDS, , , PRN, Luis Alberto Whittaker MD, 1 g at 05/15/21 1705     fentaNYL (PF) (SUBLIMAZE) injection, , Intravenous, PRN, Luis Alberto Whittaker MD, 100 mcg at 05/15/21 1634     lactated ringers infusion, , Intravenous, Continuous PRN, Luis Alberto Whittaker MD, New Bag at 05/15/21 1504     phenylephrine (JOSE A-SYNEPHRINE) injection, , Intravenous, Continuous PRN, Jamin Kohli MD, 100 mcg at 05/15/21 1649     propofol (DIPRIVAN) injection 10 mg/mL vial, , Intravenous, PRN,  Luis Alberto Whittaker MD, 50 mg at 05/15/21 1527     rocuronium injection, , Intravenous, PRN, Luis Alberto Whittaker MD, 20 mg at 05/15/21 1708    Social History:  Social History     Tobacco Use     Smoking status: Never Smoker     Smokeless tobacco: Never Used   Substance Use Topics     Alcohol use: Yes     Comment: moderate use       Family History:    Family History   Problem Relation Age of Onset     Hypertension Mother      Cancer Mother         colon cancer @ 85     Diabetes Father         type 2 diabetes     Cancer Father         lung cancer     Hypertension Sister      Psychotic Disorder Sister         depression     Hypertension Sister      Hypertension Sister      Hypertension Sister        Physical Examination:   /80   Pulse 70   Temp 99.5  F (37.5  C)   Resp 29   SpO2 100%     General: mildly sedated on propofol, some mild tremulousness throughout and quite encephalopathic  HEENT: Normocephalic, atraumatic, no epistaxis   Resp: Intubated   Cardio: RRR, S1/S2 appropriate   Abdomen: Soft, non-distended   Extremities: Warm and well perfused, peripheral pulses present, no edema  Skin: Not jaundiced, no rash   Neuro:  Mental status: Somnolent, quite difficult to arouse but possibly follows simple commands on 1 or 2 occasions.  Cranial nerves: Eyes conjugate, pupils 3 mm and sluggish bilaterally, EOM intact to oculocephalics, visual fields full to threat, face symmetric to grimace. Remainder of cranial nerves unable to be assessed due to mental status.   Motor: Tone normal with some degree of paratonia. Moving all extremities equally and weakly. No abnormal movements noted (no myoclonus).   Reflexes:  Toes up-going.  Sensory: Withdraws to noxious stimuli in all four extremities.  Possible localization with arms  Coordination: Unable to adequately assess due to mental status.  Gait: Unable to assess due to mental status.      Imaging:    CT head (initial)  IMPRESSION:   1. Large volume hyperdense  blood product intracranially is extra-axial  in the subarachnoid and intraventricular location. Mass effect and  right-to-left shift are identified at the supratentorial level.  2. Possible intraparenchymal component of hemorrhage and vasogenic  edema/evolving ischemic changes right temporal lobe.  3. Hemorrhage etiology presumed aneurysmal hemorrhage with  decompression into the subarachnoid and intraventricular location..  Please see results of CT angiogram..    CTA (initial)  IMPRESSION:  1. Lobular presumed ruptured and extravasating right supraclinoid  saccular type aneurysm as above.  2. No additional evidence for aneurysm or vascular malformation.  3. Large volume hyperdense subarachnoid hemorrhage, with  intraventricular component described on head CT.  4. Mass effect right cerebral hemisphere.  5. No additional high-grade intracranial/extracranial stenosis,  aneurysm or dissection.  6. Additional details above.    CT head (second)  Impression:   1. Interval placement left frontal approach ventricular catheter with  distal tip terminating in the right lateral ventricle. Interval  increase in hydrocephalus and intraventricular hemorrhage.  2. Diffuse subarachnoid hemorrhage, most pronounced at the right  middle cranial fossa, related to ruptured aneurysm.      Impression:  Dayron Neri is a 70 year old man with PMH of HTN and depression who presents for aneurysmal subarachnoid hemorrhage, HH5, mF4, bleed day today (5/15/2021) complicated by IVH and hydrocephalus s/p EVD, right frontotemporal IPH, and cerebral edema with midline shift.      Recommendations:   Neurologic:  #Aneurysmal SAH, HH 5, mF 4, bleed day 5/15 s/p clipping  #Right frontal-temporal IPH  #IVH c/b hydrocephalus s/p EVD  #Cerebral edema with brain compression and midline shift  Subarachnoid Hemorrhage Plan  - Admit to Neurosurgery Service  - Hunt & Walton Grade at admission: 5  - Modified Noonan Grade: 4  - Neurochecks Q 1 hour  - BP Goal:  "<120, per NSG although may change based on clipping  - Nimodipine due to suspected aneurysmal cause  - Avoid hypotonic IV fluids  - Head of bed elevated  - Telemetry, EKG  - Transcranial Doppler  - Bedside Glucose Monitoring  - PT/OT/SLP  - PM&R  - Stroke Education  - Smoking Cessation  - Euthermia, Euglycemia    #Pain/sedation  - Propofol 5-75  - Fentanyl       Cardiovascular:  #HTN  - Hold PTA meds  - Continuous cardiac monitoring  - SBP as above   - PRN's available   - Nicardipine gtt PRN      Pulmonary:  #Failure to protect airway s/p intubation  - Wean to extubate as tolerated when appropriate  - VBG on admission, appropriate oxygenation      Gastrointestinal:  No acute conerns  - Last BM: PTA  - Bowel regimen: Senna/doc, Mirilax  - GI ppx: Famotidine      Renal:  #Hypophosphatemia  - Electrolyte replacement protocol  - Plasmalyte 75/hr  - Daily BMP      Endocrine:  #Hyperglycemia  - sliding scale insulin if needed  - Hypoglycemia protocol  - Bedside glucose monitoring      Hematology/Oncology:  #Chronic anemia, stable  #Acute blood loss anemia, iatrogenic  - Hg > 7.0   - Plt > 100k   - INR <1.5   - Daily CBC      Infectious Disease:  #Leukocytosis, likely reactive  - Temperature trend  - Culture if temp >101F on second occasion  - Clinically follow for infection status      Checklist:  FEN: Plasma-lyte 75cc/hr; Electrolyte protocol; NPO  LDA: PIV x4, R radial A-line, CVC Left subclavian, EVD (Left), Gutierrez temp, ETT  PPx:   - DVT: No chemoppx, SCDs  - GI: Famotidine  Code: FULL    Dispo: ICU      Patient discussed with Fellow Dr. Fitzpatrick, and Attending Dr. Vinny Mcneill MD  PGY-2 Neurology Resident  Ascom: [id number]  05/15/2021  To page me or covering stroke neurology team member, click here: AMCOM   Choose \"On Call\" tab at top, then search dropdown box for \"Neurology Adult\", select location, press Enter, then look for stroke/neuro ICU/telestroke.  "

## 2021-05-15 NOTE — PROCEDURES
[facility]    Arterial line placement    Date/Time: 5/15/2021 2:11 PM  Performed by: Young Casillas MD  Authorized by: Young Casillas MD     UNIVERSAL PROTOCOL   Site Marked: NA  Prior Images Obtained and Reviewed:  NA  Required items: Required blood products, implants, devices and special equipment available    Patient identity confirmed:  Arm band, provided demographic data, hospital-assigned identification number and anonymous protocol, patient vented/unresponsive  Patient was reevaluated immediately before administering moderate or deep sedation or anesthesia  Confirmation Checklist:  Patient's identity using two indicators, relevant allergies, procedure was appropriate and matched the consent or emergent situation and correct equipment/implants were available  Time out: Immediately prior to the procedure a time out was called    Universal Protocol: the Joint Commission Universal Protocol was followed    ESBL (mL):  5      Indication: multiple ABGs hemodynamic monitoring  Location: right radial      SEDATION    Patient Sedated: Yes    Sedation Type:  Moderate (conscious) sedation  Sedation:  Propofol  Vital signs: Vital signs monitored during sedation      PROCEDURE DETAILS  Nathaniel's Test Normal?: Nathaniel's test not abnormal  Needle Gauge:  18  Seldinger technique: Seldinger technique used    Number of Attempts:  2  Post-procedure:  Line sutured and dressing applied  CMS: normal  PROCEDURE   Patient Tolerance:  Patient tolerated the procedure well with no immediate complications  Describe Procedure: Using palpation of the radial artery, needle easily accessed artery, wire threaded easily after establishing pulsatile flow, needle removed, 8cm 20g arterial catheter inserted easily, blood return through catheter after wire removal, connected to monitor and transduced, good waveform, line sutured in place, tegaderm applied, no immediate  complications.    Length of time physician/provider present for 1:1 monitoring during sedation: 20

## 2021-05-15 NOTE — PLAN OF CARE
Arrived from [facility] at 1140.  Major Shift Events: Remained hemodynamically stable upon arrival to unit. Intermittently posturing in all extremities. upon arrival. Pupils +3 sluggish and reactive. EVD emergently placed at bedside. Then stat CT Head, IR and then OR directly. Large amount of UOP via fournier. MD aware.  R radial artline and L subclavian CVC placed at bedside. Prop @20, Fentanyl @ 50 and Plasma lyte @ 75 infusing prior to transfer to IR.Patient left to IR/OR at 1330.  Plan: Frequent neuros and close hemodynamic monitoring post arrival from OR. Notify MD with any acute changes.   For vital signs and complete assessments, please see documentation flowsheets.

## 2021-05-15 NOTE — IR NOTE
Patient Name: Dayron Neri  Medical Record Number: [id number]  Today's Date: 5/15/2021    Procedure: diagnostic cerebral angiogram   Proceduralist: Riky Martinez    Procedure Start: 1405  Procedure end: 1445  Sedation medications administered: Sedation of fentanyl and propofol continued      Report given to: CRNA from OR  : n /a    Other Notes: Pt arrived to IR room 3 from 4A. Consent reviewed. Pt denies any questions or concerns regarding procedure. Pt positioned supine and monitored per protocol. Pt tolerated procedure without any noted complications. Pt transferred back to OR by anesthesia staff.

## 2021-05-15 NOTE — ED TRIAGE NOTES
"Pt was in shower and wife heard \"thud\" pt found pt unresponsive in shower and called 911. Medics found pt hypertensive, bradycardic and not responding verbally. Pt is moving all extremities without purpose  "

## 2021-05-15 NOTE — H&P
[facility]       NEUROSURGERY H&P NOTE    Reason for Consultation aneurysmal SAH    HPI: Dayron Neri is a 70 year old male with history of hypertension transferred from OSH after found to have aSAH with AMS. Per report, at 0800 today, the patient's wife heard a thud and found the patient unresponsive in the shower.  After three minutes of unsuccessfully trying to wake him up she called 911. At OSH, he was found to have SBP of 200s and fixed and dialated right pupil. He was given mannitol and hypertonics and his pupil became equal. He was subsequently transferred from OSH for further evaluation.    PAST MEDICAL HISTORY:   Past Medical History:   Diagnosis Date     Displacement of lumbar intervertebral disc without myelopathy 1994    Lumbar disc rupture, no surgery       PAST SURGICAL HISTORY: HTN, no FH of vascular abnormality    SOCIAL HISTORY: smoking cigar per wife    MEDICATIONS:  No current outpatient medications on file.       Allergies:  No Known Allergies    ROS: unable to obtain    Physical exam:   Blood pressure 118/80, pulse 82, temperature 94.6  F (34.8  C), resp. rate 18, SpO2 100 %.  NEUROLOGIC:  GCS 5T  Not opening eyes  Pupils 3mm bilaterally sluggish reactive  Flexor posturing BUE and triple flexion BLE  +Cough     IMAGING:  CT H 1. Large volume hyperdense blood product intracranially is extra-axial  in the subarachnoid and intraventricular location. Mass effect and  right-to-left shift are identified at the supratentorial level.  2. Possible intraparenchymal component of hemorrhage and vasogenic  edema/evolving ischemic changes right temporal lobe.  3. Hemorrhage etiology presumed aneurysmal hemorrhage with  decompression into the subarachnoid and intraventricular location..  Please see results of CT angiogram..    CTA 1. Lobular presumed ruptured and extravasating right supraclinoid  saccular type aneurysm as above.  2. No additional evidence for  aneurysm or vascular malformation.  3. Large volume hyperdense subarachnoid hemorrhage, with  intraventricular component described on head CT.  4. Mass effect right cerebral hemisphere.  5. No additional high-grade intracranial/extracranial stenosis,  aneurysm or dissection.  6. Additional details above    LABS:   INR 1.16    ASSESSMENT: 70 year old man presented with AMS found to have aSAH with right supraclinoid aneurysm. mFS4 and HH5. GCS 5.     The following conditions are also present, complicating the patient's current management, as described in the Assessment and Plan:   mechanical ventilation on day of admission, intracerebral hematoma, brain compression, acute respiratory failure, coma, based on current GCS of 5 and cerebral aneurysm rupture with hunt sims scale 5 modified fisser 4      RECOMMENDATIONS:  - EVD placement  - cerebral angiogram, OR after  - Q1hr neuro exam  - SBP<120  - HOB > 30 degrees  - Keppra for seizure ppx  - Maintain SBP < 120  - Continuous cardiac monitoring while in ICU   - NPO   - Nimodipine  - Glucose < 180  - Continue glucose checks  - Platelets > 100,000  - INR < 1.5  - Hemoglobin > 8  - DVT: SCDs while in bed  - Disposition: 4A  - PT/OT consulted    The patient was discussed with Dr. Yang, neurosurgery chief resident, and Dr. Martinez, neurosurgery attending, they agree with the above.    Adrian Paredes MD  Neurosurgery Resident

## 2021-05-15 NOTE — CONSULTS
"[facility]    Stroke Consult Note    Reason for Consult: Stroke Code    Chief Complaint: Altered Mental Status      HPI  Dayron Neri is a 70 year old male with pmhx of HTN who presented to [facility] after wife heard a \"thud\" and found the patient unresponsive. EMS recorded SBP in the 280s and 250s on arrival to ED, patient had witness seizure with unequal pupil. R 3 mm fix and L 2 mm reactive, shortly after right pupil worsen to 5 mm and fixed. Patient was intubated for airway protection, mannitol 100 g x 2 was given and patient was hyperventilated and HOB was elevated. Labetalol, hydralazine and cardene was started to bring SBP under 200 as there was high suspicion for hemorrhage. His pupils improved to 2 mm bilaterally and reactive then taken to CT where SAH was seen with a large ~1 cm x ~1 cm  Partial thrombose aneurysm near PCOM/Paraopthalmic region. SBP was capped at 140 CT and NSGY was consulted who recommended transferred to  for possible surgery.     Patient not taking any antiplatelet or anticoagulation.     I called the wife and updated her on the patient's critical condition. She states that the patient does not have family history of aneurysm and have NOT been complaining of headaches over the last several weeks or so. She states that he has history of HTN and has not been taking his medication, but his BP usually runs in the 140s. I told her the current plan is that he is being transferred out and she states that she is on the way to the [facility].     Thrombolytic Treatment   Not given due to active bleeding.    Endovascular Treatment  Not initiated due to absence of proximal vessel occlusion    Impression  #Subarachnoid Hemorrhage 2/2 PCom/ParaOphthalmic  ~1 cm x ~1 cm aneurysm, HH5, MF5  #Seizure like activity     Recommendations  Subarachnoid Hemorrhage Stroke Recommendations  - BP Goal:  SBP <140, currently on nicardipine gtt  - Nimodipine due to suspected aneurysmal cause  - " "Head of bed elevated  - Telemetry, EKG  - Bedside Glucose Monitoring  - Stroke Education  - Euthermia, Euglycemia  - Loaded with Keppra 2G   - Loaded with Mannitol in ED due to blown R pupil   - STAT NSGY consulted, will transfer to [facility]       Thank you for this consult. NCCU to follow on arrival     The Stroke Staff is Dr. Schwartz.    Jose Murdock MD  Vascular Neurology Fellow  To page me or covering stroke neurology team member, click here: AMCOM   Choose \"On Call\" tab at top, then search dropdown box for \"Neurology Adult\", select location, press Enter, then look for stroke/neuro ICU/telestroke.    ______________________________________________________    Past Medical History   Past Medical History:   Diagnosis Date     Displacement of lumbar intervertebral disc without myelopathy 1994    Lumbar disc rupture, no surgery     Past Surgical History   Past Surgical History:   Procedure Laterality Date     NO HISTORY OF SURGERY       Medications   Home Meds  Prior to Admission medications    Medication Sig Start Date End Date Taking? Authorizing Provider   Cholecalciferol (VITAMIN D) 2000 UNITS CAPS Take 1 capsule by mouth 3 times daily Divided doses recommended. 9/23/13   Carolyn Moe MD   lisinopril (PRINIVIL,ZESTRIL) 10 MG tablet Take 1 tablet (10 mg) by mouth daily 9/23/13   Carolyn Moe MD   traZODone (DESYREL) 50 MG tablet Take 50 mg by mouth At Bedtime.    Reported, Patient       Scheduled Meds      Infusion Meds      PRN Meds      Allergies   No Known Allergies  Family History   Family History   Problem Relation Age of Onset     Hypertension Mother      Cancer Mother         colon cancer @ 85     Diabetes Father         type 2 diabetes     Cancer Father         lung cancer     Hypertension Sister      Psychotic Disorder Sister         depression     Hypertension Sister      Hypertension Sister      Hypertension Sister      Social History   Social History     Tobacco Use     Smoking status: " Never Smoker     Smokeless tobacco: Never Used   Substance Use Topics     Alcohol use: Yes     Comment: moderate use     Drug use: No       Review of Systems   Review of systems not obtained due to patient factors - intubation and sedation       PHYSICAL EXAMINATION  Temp:  [94.6  F (34.8  C)] 94.6  F (34.8  C)  Pulse:  [] 82  Resp:  [8-35] 18  BP: (109-248)/() 118/80  SpO2:  [92 %-100 %] 100 %     Neurologic  Mental Status:  intubated and sedated on propofol 50  Cranial Nerves:  Pupils initially R 5 mm fix, L 2 mm reactive, improved to 2mm BL reactive, cough/gag intact, corneal intact  Motor:  no response to noxious  Reflexes:  toes down-going  Sensory:  no response to noxious  Coordination:  no myoclonus or resting tremors  Station/Gait:  deferred      Dysphagia Screen  Per Nursing    Stroke Scales    Hunt and Walton Scale (at arrival)  Grade  5  Deep coma, decerebrate rigidity, moribund appearance       Imaging  I personally reviewed all imaging; relevant findings per HPI.     Lab Results Data   CBC  Recent Labs   Lab 05/15/21  0855   WBC 9.8   RBC 4.66   HGB 14.5   HCT 43.5        Basic Metabolic Panel    Recent Labs   Lab 05/15/21  0855      POTASSIUM 3.2*   CHLORIDE 104   CO2 28   BUN 18   CR 0.78   *   RAGINI 9.1     Liver Panel  No results for input(s): PROTTOTAL, ALBUMIN, BILITOTAL, ALKPHOS, AST, ALT, BILIDIRECT in the last 168 hours.  INR    Recent Labs   Lab Test 05/15/21  0930   INR 1.07      Lipid Profile    Recent Labs   Lab Test 06/05/13  1737   CHOL 195   HDL 49      TRIG 178*   CHOLHDLRATIO 4.0     A1C  No lab results found.  Troponin I    Recent Labs   Lab 05/15/21  0855   TROPI 0.104*          Stroke Code / Stroke Consult Data Data   Stroke Code Data  (for stroke code without tele)  Stroke code activated 05/15/21   0908   First stroke provider response 05/15/21   0908   Last known normal 05/15/21   0800   Time of discovery   (or onset of symptoms) 05/15/21   0800    Head CT read by Stroke Neuro Dr/Provider 05/15/21   1000   Was stroke code de-escalated? No

## 2021-05-15 NOTE — PLAN OF CARE
Admitted/transferred from: [facility]  Reason for admission/transfer: Emergent EVD/IR/OR  Patient status upon admission/transfer: Hemodynamically stable, neuro unstable  Interventions: EVD placed, Art line and Central access obtained. Stat Head CT, emergent IR and OR  Plan: Emergent IR and OR  2 RN skin assessment: completed by Lulu GUAMAN and Antonio CASTRO  Result of skin assessment and interventions/actions:  Height, weight, drug calc weight: done  Patient belongings: Just socks and underwear  MDRO education (if applicable):  N/A Stat OR

## 2021-05-15 NOTE — ED NOTES
[facility] ICU VENTILATOR RESPIRATORY NOTE  Date of Admission: 05/15/21  Date of Intubation (most recent): 05/15/21  Reason for Mechanical Ventilation: head bleed  Number of Days on Mechanical Ventilation: 1  Significant Events Today: pt intubated with 8.0 ETT secured 26@lip. Tube placement verified by ETCO2 and BS. Pt placed on vend CMV 30/500/40%/+5 Pt transported to CT.     ETT appearance on chest x-ray: pending    Plan:  Cont monitoring.

## 2021-05-15 NOTE — PROCEDURES
[facility]    Central line - left subclavian    Date/Time: 5/15/2021 2:10 PM  Performed by: Sukhjinder Fitzpatrick MD  Authorized by: Young Casillas MD   Indications: vascular access    UNIVERSAL PROTOCOL   Site Marked: NA  Prior Images Obtained and Reviewed:  NA  Required items: Required blood products, implants, devices and special equipment available    Patient identity confirmed:  Arm band, provided demographic data and hospital-assigned identification number  Patient was reevaluated immediately before administering moderate or deep sedation or anesthesia  Confirmation Checklist:  Patient's identity using two indicators, relevant allergies, procedure was appropriate and matched the consent or emergent situation and correct equipment/implants were available  Time out: Immediately prior to the procedure a time out was called    Universal Protocol: the Joint Commission Universal Protocol was followed    Preparation: Patient was prepped and draped in usual sterile fashion           ANESTHESIA    Anesthesia: Local infiltration  Local Anesthetic:  Lidocaine 2% without epinephrine      SEDATION    Patient Sedated: Yes    Sedation Type:  Moderate (conscious) sedation  Sedation:  Propofol  Vital signs: Vital signs monitored during sedation      Preparation: skin prepped with ChloraPrep  Skin prep agent dried: skin prep agent completely dried prior to procedure  Sterile barriers: all five maximum sterile barriers used - cap, mask, sterile gown, sterile gloves, and large sterile sheet  Hand hygiene: hand hygiene performed prior to central venous catheter insertion  Patient position: reverse Trendelenburg  Catheter type: triple lumen  Catheter size: 7 Fr  Pre-procedure: landmarks identified  Ultrasound guidance: yes  Sterile ultrasound techniques: sterile gel and sterile probe covers were used  Number of attempts: 4  Successful placement: yes  Post-procedure: line sutured and  dressing applied  Assessment: blood return through all ports and free fluid flow (XR to be obtained in IR)    PROCEDURE   Patient Tolerance:  Patient tolerated the procedure well with no immediate complications and patient tolerated the procedure well with no immediate complications    Length of time physician/provider present for 1:1 monitoring during sedation: 30

## 2021-05-15 NOTE — ED PROVIDER NOTES
History   Chief Complaint:  Altered Mental Status     The history is provided by the EMS personnel. The history is limited by the condition of the patient.      Dayron Neri is a 70 year old male with history of hypertension who presents with altered mental status. Per report, at 0800 today, the patient's wife heard a thud and found the patient unresponsive in the shower.  After three minutes of unsuccessfully trying to wake him up she called 911. With EMS he was verbally unresponsive and did not follow commands but made purposeful movements to reposition himself. His blood pressure was 280/127 and his blood sugar was 127. In the ambulance he was moving all of his extremities but his right pupil was noted to be dilated.  His wife noted that this is abnormal for him and he usually walks everyday and is able to communicate normally.   Wife told EMS that the patient does not like going to doctors and therefore has not been medically evaluated in many years.    Review of Systems   Unable to perform ROS: Mental status change     Allergies:  The patient has no known allergies.     Medications:  Vitamin D  Lisinopril  Desyrel    Past Medical History:    Displacement of lumbar intervertebral disc without myelopathy  Adjustment disorder with depressed mood  Hypertension    Family History:    Mother: Hypertension, Colon cancer  Father: Type 2 diabetes, Lung cancer  Sister: Hypertension, Depression, Hypertension    Social History:  Lives with wife.    Physical Exam     Patient Vitals for the past 24 hrs:   BP Pulse Resp SpO2 Weight   05/15/21 1020 122/77 60 30 100 % --   05/15/21 1010 (!) 156/101 58 30 99 % --   05/15/21 1000 (!) 215/130 59 29 -- --   05/15/21 0940 (!) 158/118 -- -- -- --   05/15/21 0930 (!) 157/118 70 (!) 35 92 % --   05/15/21 0920 -- 129 8 99 % --   05/15/21 0910 (!) 248/153 (!) 41 13 95 % --   05/15/21 0900 (!) 217/200 65 18 99 % --   05/15/21 0857 (!) 233/139 (!) 45 16 97 % 74 kg (163 lb 2.3 oz)        Physical Exam  General: Ill-appearing male initially lying in right lateral decubitus position in [facility]  HENT: No palpable acute deformity to face  Eyes: Right pupil initially approximately 5 mm and reactive, left pupil approximately 3 mm and reactive  CV: Strong symmetric distal pulses in all 4 extremities, regular rhythm, bradycardic  Resp: Irregular respirations, no cough observed, symmetric breath sounds  GI: Abdomen soft, significant fullness in left inguinal canal extending into left scrotum consistent with hernia  MSK: No apparent acute deformities to limbs.  No appreciable deformity to neck.  Skin: Slightly pale diffusely  Neuro: Asymmetric pupils as above, eyes closed, no nystagmus, moves all 4 extremities intermittently to reposition himself, no clonus, does not follow any commands, completely nonverbal  Psych: Nonverbal so unable to fully assess    Emergency Department Course   ECG:  ECG taken at 0907, ECG read at 0923  Junctional bradycardia  Voltage criteria for left ventricular hypertrophy  Marked ST abnormality, possible inferior subendocardial injury  Rate 43 bpm. ME interval * ms. QRS duration 112 ms. QT/QTc 462/390 ms. P-R-T axes * 63 63.    Imaging:  XR Chest Port 1 View  Endotracheal tube is in place, with tip 6.5 cm above the  lisha. Lungs clear. No pneumothorax. Pulmonary vascularity is within  normal limits.  Reading per radiology    CT Cervical Spine w/o Contrast  1. No evidence for acute fracture or posttraumatic malalignment.  2. Considerable subarachnoid hemorrhage at the posterior fossa level  described on head CT.  3. Nothing for paraspinous hemorrhage.  4. No high-grade spinal canal or foraminal narrowing.  5. Patient is intubated.  6. Gas density at the right skull level is presumed related to  injection with no evidence for fracture at this level appreciated.  Mastoid air cells are clear.  Reading per radiology    CTA Head Neck with Contrast  1. Lobular presumed ruptured and  extravasating right supraclinoid  saccular type aneurysm as above.  2. No additional evidence for aneurysm or vascular malformation.  3. Large volume hyperdense subarachnoid hemorrhage, with  intraventricular component described on head CT.  4. Mass effect right cerebral hemisphere.  5. No additional high-grade intracranial/extracranial stenosis,  aneurysm or dissection.  6. Additional details above.  Reading per radiology    CT Head w/o Contrast  1. Large volume hyperdense blood product intracranially is extra-axial  in the subarachnoid and intraventricular location. Mass effect and  right-to-left shift are identified at the supratentorial level.  2. Possible intraparenchymal component of hemorrhage and vasogenic  edema/evolving ischemic changes right temporal lobe.  3. Hemorrhage etiology presumed aneurysmal hemorrhage with  decompression into the subarachnoid and intraventricular location..  Please see results of CT angiogram..  Reading per radiology    Laboratory:  CBC: WBC 9.8, HGB 14.5,   BMP: Potassium 3.2 (L), Glucose 177 (H) o/w WNL (Creatinine 0.78)    Troponin (Collected 0855): 0.104 (H)    INR: 1.07    Partial thromboplastin time: 27    Alcohol ethyl: <0.01    Asymptomatic COVID-19 Virus (Coronavirus) by PCR Nasopharyngeal swab: Pending    Procedures    ED Procedure Note - Endotracheal Intubation     Performed by: Arden Blevins MD    The procedure was performed in an emergent situation.    Patient identity confirmed using multiple methods.    Indication:  Airway protection    Intubation method: Glidescope  Patient status: RSI  Preoxygenation: Facemask, and apneic oxygenation using nasal canula  Sedatives: Propofol  100 mg  Paralytic: rocuronium 100 mg  Laryngoscope size: Glidescope 4  Tube size: 8.0 cuffed with cuff inflated after placement  Number of attempts: 1  Cricoid pressure: yes  Cords visualized: yes  Post-procedure assessment: Breath sounds equal bilaterally with chest rise and absent  over the epigastrium, Chest x-ray interpreted by me demonstrating endotracheal tube in appropriate position and CO2 detector.  ETT depth: 24 cm to lips  Tube secured with: ETT smith    Patient tolerated the procedure well with no immediate complications.  Complications:  None apparent.    Emergency Department Course:  Reviewed:  I reviewed nursing notes, vitals and past medical history    Assessments:  0851 I obtained history and examined the patient as noted above.   0855 I called Code Stroke.  0901 I rechecked the patient. The patient experienced a tonic clonic seizure that lasted 1 minute.  0914 The Neuro/Stroke fellow arrived.  0915 The intubation procedure was started as noted above.  0918 Intubation was completed.  0924 I rechecked the patient. His right pupil is getting larger and is less reactive  0927 I rechecked the patient.  0932 Dr. Schwartz from neurology arrived to asses the patient.  0937 The patient was sent to CT. respiratory therapy accompanied the patient.  1009 I rechecked the patient.  1022 A portable chest XR was obtained  1024 I viewed the portable chest XR    Consults:   0910 I spoke with Dr. Murdock from Neuro/Stroke.  1004 I spoke with Dr. Schwartz from neurology regarding the patient.  1015 I spoke with radiology regarding the patient's imaging    Interventions:  0915 NS 1L IV Bolus  0916 Propofol 100mg/hr IV  0920 Propofol 40mcg/kg/min IV  0926 Mannitol 20% infusion 100g IV  0930 Keppra 1,000mg IV  0930 Keppra 1,000mg IV  1006 Cardene 10mg/hr IV  1008 Cardene 15mg/hr Dose change  1013 NS 1L IV Bolus  1027 Cardene 10mg/hr Dose change    Disposition:  The patient was transferred to the [facility] via ambulance. Dr. Martinez accepted the patient for transfer.     Impression & Plan   Covid-19  Dayron Neri was evaluated during a global COVID-19 pandemic, which necessitated consideration that the patient might be at risk for infection with the SARS-CoV-2 virus that causes  COVID-19.   Applicable protocols for evaluation were followed during the patient's care.   COVID-19 was considered as part of the patient's evaluation. The plan for testing is:  a test was obtained during this visit.      Medical Decision Making:  Upon arrival he was obviously critically ill with hypertensive emergency and neurologic deficits prompting code stroke activation given the very recent onset of symptoms, having been reportedly normal and interacting with his wife just moments prior to his 8 AM onset of symptoms and collapse.  Work-up unfortunately revealed evidence of devastating intracranial hemorrhage, likely aneurysmal subarachnoid as the underlying etiology.  He had some vomiting here in the emergency department, along with a brief self-limiting seizure, prompting antiepileptics and intubation which was performed as documented above.  I am grateful for the prompt bedside assistance of the stroke neuro critical care service, and multiple medications were administered as documented above along with antihypertensive drip.  We directly discussed the possibility of placing arterial or central lines, but this was ultimately deferred, and consultation with the accepting service, in the interest of expedited lights and sirens EMS transport to [place] for definitive evaluation and treatment by the neurosurgery service there.  He was also seen by her [facility] neurosurgery service.  Neurologic examination is fluctuating, with asymmetric pupils that initially normalized before becoming abnormal again, as witnessed on serial examinations by both myself and the neuro critical care fellow.  He has evidence of nonspecific but abnormal electrocardiogram along with a slightly elevated troponin, likely secondary to his intracranial process, along with mild hypokalemia and potassium replacement was ordered.       Diagnosis:    ICD-10-CM    1. Subarachnoid hemorrhage (H)  I60.9 Asymptomatic SARS-CoV-2 COVID-19 Virus  (Coronavirus) by PCR   2. Hypertensive emergency  I16.1    3. Unresponsiveness  R41.89    4. NSTEMI (non-ST elevated myocardial infarction) (H)  I21.4    5. Hypokalemia  E87.6    6. Intracranial aneurysm  I67.1     highly suspected     Critical Care Time: Over 45 minutes, independent of procedures.  This included time spent obtaining a history and physical, reviewing the patient's chart, interpreting lab and imaging studies, re-examining the patient, coordinating care with other providers, and documenting ED care provided.  He has evidence of hypertensive emergency, with life-threatening aneurysmal subarachnoid hemorrhage with evidence of brainstem herniation based on clinical exam.  He was resuscitated aggressively with intubation and multiple medications including vasoactive drip.  His condition carries high morbidity and mortality.  He requires emergent neurosurgical intervention to optimize his outcome, though I still would not be surprised if this acute disease ultimately proves fatal in the near term.    Scribe Disclosure:  I, Loi Ram, am serving as a scribe at 8:59 AM on 5/15/2021 to document services personally performed by Arden Blevins MD based on my observations and the provider's statements to me.    This note was completed in part using Dragon voice recognition software. Although reviewed after completion, some word and grammatical errors may occur.     Arden Blevins MD  05/15/21 7161

## 2021-05-16 ENCOUNTER — APPOINTMENT (OUTPATIENT)
Dept: CT IMAGING | Facility: CLINIC | Age: 71
DRG: 003 | End: 2021-05-16
Attending: STUDENT IN AN ORGANIZED HEALTH CARE EDUCATION/TRAINING PROGRAM
Payer: COMMERCIAL

## 2021-05-16 ENCOUNTER — APPOINTMENT (OUTPATIENT)
Dept: GENERAL RADIOLOGY | Facility: CLINIC | Age: 71
DRG: 003 | End: 2021-05-16
Attending: STUDENT IN AN ORGANIZED HEALTH CARE EDUCATION/TRAINING PROGRAM
Payer: COMMERCIAL

## 2021-05-16 ENCOUNTER — APPOINTMENT (OUTPATIENT)
Dept: ULTRASOUND IMAGING | Facility: CLINIC | Age: 71
DRG: 003 | End: 2021-05-16
Attending: STUDENT IN AN ORGANIZED HEALTH CARE EDUCATION/TRAINING PROGRAM
Payer: COMMERCIAL

## 2021-05-16 LAB
ANION GAP SERPL CALCULATED.3IONS-SCNC: 4 MMOL/L (ref 3–14)
ANION GAP SERPL CALCULATED.3IONS-SCNC: 6 MMOL/L (ref 3–14)
BASE DEFICIT BLDA-SCNC: 2.9 MMOL/L
BUN SERPL-MCNC: 15 MG/DL (ref 7–30)
BUN SERPL-MCNC: 16 MG/DL (ref 7–30)
CALCIUM SERPL-MCNC: 8.8 MG/DL (ref 8.5–10.1)
CALCIUM SERPL-MCNC: 8.9 MG/DL (ref 8.5–10.1)
CHLORIDE SERPL-SCNC: 115 MMOL/L (ref 94–109)
CHLORIDE SERPL-SCNC: 118 MMOL/L (ref 94–109)
CO2 SERPL-SCNC: 27 MMOL/L (ref 20–32)
CO2 SERPL-SCNC: 27 MMOL/L (ref 20–32)
CREAT SERPL-MCNC: 0.66 MG/DL (ref 0.66–1.25)
CREAT SERPL-MCNC: 0.88 MG/DL (ref 0.66–1.25)
ERYTHROCYTE [DISTWIDTH] IN BLOOD BY AUTOMATED COUNT: 12.6 % (ref 10–15)
GFR SERPL CREATININE-BSD FRML MDRD: 88 ML/MIN/{1.73_M2}
GFR SERPL CREATININE-BSD FRML MDRD: >90 ML/MIN/{1.73_M2}
GLUCOSE BLDC GLUCOMTR-MCNC: 115 MG/DL (ref 70–99)
GLUCOSE BLDC GLUCOMTR-MCNC: 157 MG/DL (ref 70–99)
GLUCOSE SERPL-MCNC: 126 MG/DL (ref 70–99)
GLUCOSE SERPL-MCNC: 162 MG/DL (ref 70–99)
HBA1C MFR BLD: 5.5 % (ref 0–5.6)
HCO3 BLD-SCNC: 21 MMOL/L (ref 21–28)
HCT VFR BLD AUTO: 35.3 % (ref 40–53)
HGB BLD-MCNC: 11.7 G/DL (ref 13.3–17.7)
MAGNESIUM SERPL-MCNC: 2.4 MG/DL (ref 1.6–2.3)
MAGNESIUM SERPL-MCNC: 2.4 MG/DL (ref 1.6–2.3)
MCH RBC QN AUTO: 31.3 PG (ref 26.5–33)
MCHC RBC AUTO-ENTMCNC: 33.1 G/DL (ref 31.5–36.5)
MCV RBC AUTO: 94 FL (ref 78–100)
O2/TOTAL GAS SETTING VFR VENT: 30 %
PCO2 BLD: 30 MM HG (ref 35–45)
PH BLD: 7.45 PH (ref 7.35–7.45)
PHOSPHATE SERPL-MCNC: 4.3 MG/DL (ref 2.5–4.5)
PLATELET # BLD AUTO: 235 10E9/L (ref 150–450)
PO2 BLD: 137 MM HG (ref 80–105)
POTASSIUM SERPL-SCNC: 2.8 MMOL/L (ref 3.4–5.3)
POTASSIUM SERPL-SCNC: 3.5 MMOL/L (ref 3.4–5.3)
RBC # BLD AUTO: 3.74 10E12/L (ref 4.4–5.9)
SODIUM SERPL-SCNC: 148 MMOL/L (ref 133–144)
SODIUM SERPL-SCNC: 149 MMOL/L (ref 133–144)
WBC # BLD AUTO: 15 10E9/L (ref 4–11)

## 2021-05-16 PROCEDURE — 93010 ELECTROCARDIOGRAM REPORT: CPT | Performed by: INTERNAL MEDICINE

## 2021-05-16 PROCEDURE — 83735 ASSAY OF MAGNESIUM: CPT | Performed by: COUNSELOR

## 2021-05-16 PROCEDURE — 250N000011 HC RX IP 250 OP 636: Performed by: NEUROLOGICAL SURGERY

## 2021-05-16 PROCEDURE — 99291 CRITICAL CARE FIRST HOUR: CPT | Mod: GC | Performed by: PSYCHIATRY & NEUROLOGY

## 2021-05-16 PROCEDURE — 250N000013 HC RX MED GY IP 250 OP 250 PS 637: Performed by: STUDENT IN AN ORGANIZED HEALTH CARE EDUCATION/TRAINING PROGRAM

## 2021-05-16 PROCEDURE — 44500 INTRO GASTROINTESTINAL TUBE: CPT

## 2021-05-16 PROCEDURE — 250N000009 HC RX 250: Performed by: STUDENT IN AN ORGANIZED HEALTH CARE EDUCATION/TRAINING PROGRAM

## 2021-05-16 PROCEDURE — 83735 ASSAY OF MAGNESIUM: CPT | Performed by: STUDENT IN AN ORGANIZED HEALTH CARE EDUCATION/TRAINING PROGRAM

## 2021-05-16 PROCEDURE — 74018 RADEX ABDOMEN 1 VIEW: CPT | Mod: 26 | Performed by: RADIOLOGY

## 2021-05-16 PROCEDURE — 999N000185 HC STATISTIC TRANSPORT TIME EA 15 MIN

## 2021-05-16 PROCEDURE — 70496 CT ANGIOGRAPHY HEAD: CPT

## 2021-05-16 PROCEDURE — 70450 CT HEAD/BRAIN W/O DYE: CPT

## 2021-05-16 PROCEDURE — 94003 VENT MGMT INPAT SUBQ DAY: CPT

## 2021-05-16 PROCEDURE — 999N000157 HC STATISTIC RCP TIME EA 10 MIN

## 2021-05-16 PROCEDURE — 999N000076 HC STATISTIC ICP MONITORING

## 2021-05-16 PROCEDURE — 93005 ELECTROCARDIOGRAM TRACING: CPT

## 2021-05-16 PROCEDURE — 250N000011 HC RX IP 250 OP 636: Performed by: PSYCHIATRY & NEUROLOGY

## 2021-05-16 PROCEDURE — 258N000003 HC RX IP 258 OP 636: Performed by: STUDENT IN AN ORGANIZED HEALTH CARE EDUCATION/TRAINING PROGRAM

## 2021-05-16 PROCEDURE — 84100 ASSAY OF PHOSPHORUS: CPT | Performed by: STUDENT IN AN ORGANIZED HEALTH CARE EDUCATION/TRAINING PROGRAM

## 2021-05-16 PROCEDURE — 85027 COMPLETE CBC AUTOMATED: CPT | Performed by: STUDENT IN AN ORGANIZED HEALTH CARE EDUCATION/TRAINING PROGRAM

## 2021-05-16 PROCEDURE — 999N000015 HC STATISTIC ARTERIAL MONITORING DAILY

## 2021-05-16 PROCEDURE — 99207 CT HEAD W/O CONTRAST: CPT | Mod: 26 | Performed by: STUDENT IN AN ORGANIZED HEALTH CARE EDUCATION/TRAINING PROGRAM

## 2021-05-16 PROCEDURE — 80048 BASIC METABOLIC PNL TOTAL CA: CPT | Performed by: STUDENT IN AN ORGANIZED HEALTH CARE EDUCATION/TRAINING PROGRAM

## 2021-05-16 PROCEDURE — 70498 CT ANGIOGRAPHY NECK: CPT | Mod: 26 | Performed by: STUDENT IN AN ORGANIZED HEALTH CARE EDUCATION/TRAINING PROGRAM

## 2021-05-16 PROCEDURE — 250N000009 HC RX 250: Performed by: PSYCHIATRY & NEUROLOGY

## 2021-05-16 PROCEDURE — 93886 INTRACRANIAL COMPLETE STUDY: CPT

## 2021-05-16 PROCEDURE — 250N000011 HC RX IP 250 OP 636: Performed by: STUDENT IN AN ORGANIZED HEALTH CARE EDUCATION/TRAINING PROGRAM

## 2021-05-16 PROCEDURE — 999N001017 HC STATISTIC GLUCOSE BY METER IP

## 2021-05-16 PROCEDURE — 200N000002 HC R&B ICU UMMC

## 2021-05-16 PROCEDURE — 93886 INTRACRANIAL COMPLETE STUDY: CPT | Mod: 26 | Performed by: RADIOLOGY

## 2021-05-16 PROCEDURE — 0042T CT HEAD PERFUSION WITH CONTRAST: CPT

## 2021-05-16 PROCEDURE — 70496 CT ANGIOGRAPHY HEAD: CPT | Mod: 26 | Performed by: STUDENT IN AN ORGANIZED HEALTH CARE EDUCATION/TRAINING PROGRAM

## 2021-05-16 PROCEDURE — 0042T CT HEAD PERFUSION WITH CONTRAST: CPT | Mod: GC | Performed by: STUDENT IN AN ORGANIZED HEALTH CARE EDUCATION/TRAINING PROGRAM

## 2021-05-16 PROCEDURE — 80048 BASIC METABOLIC PNL TOTAL CA: CPT | Performed by: COUNSELOR

## 2021-05-16 PROCEDURE — 999N000065 XR ABDOMEN PORT 1 VIEWS

## 2021-05-16 PROCEDURE — 82803 BLOOD GASES ANY COMBINATION: CPT | Performed by: PSYCHIATRY & NEUROLOGY

## 2021-05-16 PROCEDURE — 250N000013 HC RX MED GY IP 250 OP 250 PS 637: Performed by: PSYCHIATRY & NEUROLOGY

## 2021-05-16 RX ORDER — LABETALOL HYDROCHLORIDE 5 MG/ML
20 INJECTION, SOLUTION INTRAVENOUS
Status: DISCONTINUED | OUTPATIENT
Start: 2021-05-16 | End: 2021-05-17

## 2021-05-16 RX ORDER — LIDOCAINE HYDROCHLORIDE 20 MG/ML
5 SOLUTION OROPHARYNGEAL ONCE
Status: COMPLETED | OUTPATIENT
Start: 2021-05-16 | End: 2021-05-16

## 2021-05-16 RX ORDER — IOPAMIDOL 755 MG/ML
115 INJECTION, SOLUTION INTRAVASCULAR ONCE
Status: COMPLETED | OUTPATIENT
Start: 2021-05-16 | End: 2021-05-16

## 2021-05-16 RX ORDER — ACETAMINOPHEN 325 MG/1
975 TABLET ORAL EVERY 6 HOURS PRN
Status: DISCONTINUED | OUTPATIENT
Start: 2021-05-16 | End: 2021-05-16

## 2021-05-16 RX ORDER — NALOXONE HYDROCHLORIDE 0.4 MG/ML
0.4 INJECTION, SOLUTION INTRAMUSCULAR; INTRAVENOUS; SUBCUTANEOUS
Status: DISCONTINUED | OUTPATIENT
Start: 2021-05-16 | End: 2021-06-08

## 2021-05-16 RX ORDER — HYDRALAZINE HYDROCHLORIDE 20 MG/ML
10-20 INJECTION INTRAMUSCULAR; INTRAVENOUS
Status: DISCONTINUED | OUTPATIENT
Start: 2021-05-16 | End: 2021-05-17

## 2021-05-16 RX ORDER — PROCHLORPERAZINE 25 MG
12.5 SUPPOSITORY, RECTAL RECTAL EVERY 12 HOURS PRN
Status: DISCONTINUED | OUTPATIENT
Start: 2021-05-16 | End: 2021-05-28

## 2021-05-16 RX ORDER — OXYCODONE HYDROCHLORIDE 5 MG/1
5 TABLET ORAL EVERY 4 HOURS PRN
Status: DISCONTINUED | OUTPATIENT
Start: 2021-05-16 | End: 2021-05-19

## 2021-05-16 RX ORDER — NALOXONE HYDROCHLORIDE 0.4 MG/ML
0.2 INJECTION, SOLUTION INTRAMUSCULAR; INTRAVENOUS; SUBCUTANEOUS
Status: DISCONTINUED | OUTPATIENT
Start: 2021-05-16 | End: 2021-06-08

## 2021-05-16 RX ORDER — ONDANSETRON 2 MG/ML
4 INJECTION INTRAMUSCULAR; INTRAVENOUS EVERY 6 HOURS PRN
Status: DISCONTINUED | OUTPATIENT
Start: 2021-05-16 | End: 2021-05-28

## 2021-05-16 RX ORDER — HYDRALAZINE HYDROCHLORIDE 20 MG/ML
10 INJECTION INTRAMUSCULAR; INTRAVENOUS
Status: DISCONTINUED | OUTPATIENT
Start: 2021-05-16 | End: 2021-05-16

## 2021-05-16 RX ORDER — PROCHLORPERAZINE MALEATE 5 MG
5 TABLET ORAL EVERY 6 HOURS PRN
Status: DISCONTINUED | OUTPATIENT
Start: 2021-05-16 | End: 2021-05-28

## 2021-05-16 RX ORDER — DEXMEDETOMIDINE HYDROCHLORIDE 4 UG/ML
0.2-0.7 INJECTION, SOLUTION INTRAVENOUS CONTINUOUS
Status: DISCONTINUED | OUTPATIENT
Start: 2021-05-16 | End: 2021-05-16

## 2021-05-16 RX ORDER — ACETAMINOPHEN 325 MG/1
975 TABLET ORAL EVERY 8 HOURS SCHEDULED
Status: DISCONTINUED | OUTPATIENT
Start: 2021-05-16 | End: 2021-05-23

## 2021-05-16 RX ORDER — ONDANSETRON 4 MG/1
4 TABLET, ORALLY DISINTEGRATING ORAL EVERY 6 HOURS PRN
Status: DISCONTINUED | OUTPATIENT
Start: 2021-05-16 | End: 2021-05-28

## 2021-05-16 RX ORDER — POTASSIUM CHLORIDE 29.8 MG/ML
20 INJECTION INTRAVENOUS
Status: COMPLETED | OUTPATIENT
Start: 2021-05-16 | End: 2021-05-17

## 2021-05-16 RX ORDER — ATROPINE SULFATE 0.1 MG/ML
INJECTION INTRAVENOUS
Status: DISCONTINUED
Start: 2021-05-16 | End: 2021-05-16 | Stop reason: HOSPADM

## 2021-05-16 RX ADMIN — Medication 10 ML: at 16:04

## 2021-05-16 RX ADMIN — POTASSIUM CHLORIDE 20 MEQ: 29.8 INJECTION, SOLUTION INTRAVENOUS at 22:40

## 2021-05-16 RX ADMIN — NIMODIPINE 60 MG: 30 SOLUTION ORAL at 03:55

## 2021-05-16 RX ADMIN — Medication 10 ML: at 00:17

## 2021-05-16 RX ADMIN — NIMODIPINE 60 MG: 30 SOLUTION ORAL at 07:49

## 2021-05-16 RX ADMIN — HYDRALAZINE HYDROCHLORIDE 10 MG: 20 INJECTION INTRAMUSCULAR; INTRAVENOUS at 18:12

## 2021-05-16 RX ADMIN — ACETAMINOPHEN 975 MG: 325 TABLET, FILM COATED ORAL at 16:03

## 2021-05-16 RX ADMIN — NIMODIPINE 60 MG: 30 SOLUTION ORAL at 20:14

## 2021-05-16 RX ADMIN — POTASSIUM CHLORIDE 20 MEQ: 29.8 INJECTION, SOLUTION INTRAVENOUS at 23:46

## 2021-05-16 RX ADMIN — HYDRALAZINE HYDROCHLORIDE 10 MG: 20 INJECTION INTRAMUSCULAR; INTRAVENOUS at 20:13

## 2021-05-16 RX ADMIN — POTASSIUM CHLORIDE 20 MEQ: 29.8 INJECTION, SOLUTION INTRAVENOUS at 21:34

## 2021-05-16 RX ADMIN — DEXMEDETOMIDINE 0.2 MCG/KG/HR: 100 INJECTION, SOLUTION, CONCENTRATE INTRAVENOUS at 09:39

## 2021-05-16 RX ADMIN — IOPAMIDOL 115 ML: 755 INJECTION, SOLUTION INTRAVENOUS at 10:44

## 2021-05-16 RX ADMIN — Medication 10 ML: at 11:50

## 2021-05-16 RX ADMIN — Medication 15 ML: at 16:31

## 2021-05-16 RX ADMIN — Medication 50 MCG/HR: at 00:39

## 2021-05-16 RX ADMIN — ACETAMINOPHEN 975 MG: 325 TABLET, FILM COATED ORAL at 08:37

## 2021-05-16 RX ADMIN — SODIUM CHLORIDE, SODIUM GLUCONATE, SODIUM ACETATE, POTASSIUM CHLORIDE AND MAGNESIUM CHLORIDE 100 ML/HR: 526; 502; 368; 37; 30 INJECTION, SOLUTION INTRAVENOUS at 21:33

## 2021-05-16 RX ADMIN — HYDRALAZINE HYDROCHLORIDE 10 MG: 20 INJECTION, SOLUTION INTRAMUSCULAR; INTRAVENOUS at 12:04

## 2021-05-16 RX ADMIN — Medication 10 ML: at 20:14

## 2021-05-16 RX ADMIN — Medication 10 ML: at 07:49

## 2021-05-16 RX ADMIN — SODIUM CHLORIDE, SODIUM GLUCONATE, SODIUM ACETATE, POTASSIUM CHLORIDE AND MAGNESIUM CHLORIDE 100 ML/HR: 526; 502; 368; 37; 30 INJECTION, SOLUTION INTRAVENOUS at 12:02

## 2021-05-16 RX ADMIN — SODIUM CHLORIDE, SODIUM GLUCONATE, SODIUM ACETATE, POTASSIUM CHLORIDE AND MAGNESIUM CHLORIDE 100 ML/HR: 526; 502; 368; 37; 30 INJECTION, SOLUTION INTRAVENOUS at 01:59

## 2021-05-16 RX ADMIN — NIMODIPINE 60 MG: 30 SOLUTION ORAL at 11:50

## 2021-05-16 RX ADMIN — NIMODIPINE 60 MG: 30 SOLUTION ORAL at 16:04

## 2021-05-16 RX ADMIN — PROPOFOL 20 MCG/KG/MIN: 10 INJECTION, EMULSION INTRAVENOUS at 05:55

## 2021-05-16 RX ADMIN — NIMODIPINE 60 MG: 30 SOLUTION ORAL at 00:17

## 2021-05-16 RX ADMIN — LIDOCAINE HYDROCHLORIDE 5 ML: 20 SOLUTION ORAL; TOPICAL at 15:49

## 2021-05-16 RX ADMIN — Medication 10 ML: at 03:55

## 2021-05-16 RX ADMIN — OXYCODONE HYDROCHLORIDE 5 MG: 5 TABLET ORAL at 20:13

## 2021-05-16 RX ADMIN — HYDRALAZINE HYDROCHLORIDE 10 MG: 20 INJECTION, SOLUTION INTRAMUSCULAR; INTRAVENOUS at 08:52

## 2021-05-16 ASSESSMENT — VISUAL ACUITY
OU: NOT TESTABLE

## 2021-05-16 ASSESSMENT — ACTIVITIES OF DAILY LIVING (ADL)
ADLS_ACUITY_SCORE: 21
ADLS_ACUITY_SCORE: 19
ADLS_ACUITY_SCORE: 21
ADLS_ACUITY_SCORE: 19

## 2021-05-16 NOTE — PROGRESS NOTES
CLINICAL NUTRITION SERVICES - ASSESSMENT NOTE     Nutrition Prescription    RECOMMENDATIONS FOR MDs/PROVIDERS TO ORDER:  --Water flushes and bowel regimen as per primary team.    Malnutrition Status:    Severe malnutrition in the context of acute on chronic illness    Recommendations already ordered by Registered Dietitian (RD):  --Pending AXR confirmation of feeding tube position (gastric okay)  --GOAL: Pivot 1.5 Cristi @ goal of 60ml/hr (1440ml/day) will provide: 2160 kcals (31 kcal/kg), 135 g PRO (2 g/kg), 1080 ml free H20, 248 g CHO, and 10 g fiber daily.  --Start TF @ 10 ml/hr and advance by 10 ml q 8 hrs until goal rate.  --Do not start or advance TF rate unless K+ >3.0, Mg++ > 1.5,  and Phos > 1.9.  --Baseline and weekly CRP while on immune-modulating formula.  --Minimum 30 ml q 4 hrs water flushes for tube patency.  --If gastric enteral access: HOB > 30 degrees  --MVI/minerals supplement if not already ordered - Certavite    Future/Additional Recommendations:  --TF tolerance via gastric access (assuming per Elli AXR pending).  --Lytes with refeeding risk.  --Weight history as able.  --SLP diet advancement.      REASON FOR ASSESSMENT  Dayron Neri is a/an 70 year old male assessed by the dietitian for Provider Order - Registered Dietitian to Assess and Order TF per Medical Nutrition Therapy Protocol + ppFT placement    NUTRITION HISTORY  PMH of HTN and depression who presents for aneurysmal subarachnoid hemorrhage with significant AMS.  5/15:   1.  Pterional craniotomy.  2.  Microscopic dissection for clipping of aneurysm.  3.  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  4.  Evacuation of intracerebral hemorrhage    Extubated this afternoon and lost enteral access. RN attempted to place 16 Fr NGT multiple times, unable to place. 10 Fr NGT placed at bedside (see procedure note). Spoke with Neuro Critical Care team, dwight for gastric enteral access and for TF orders.     CURRENT NUTRITION  "ORDERS  Diet: NPO    LABS  Na 148 (H)    MEDICATIONS  Plamsa-Lyte A @ 100 ml/hr    ANTHROPOMETRICS  Height: 180.3 cm (5' 11\")  Most Recent Weight: 69 kg (152 lb 1.9 oz)    IBW: 78.2 kg  BMI: Normal BMI  Weight History: no recent weights per Care Everywhere, pt unable to discuss weight history due to mentation  Wt Readings from Last 20 Encounters:   05/15/21 69 kg (152 lb 1.9 oz)   05/15/21 74 kg (163 lb 2.3 oz)   09/23/13 76 kg (167 lb 8 oz)   06/05/13 78.1 kg (172 lb 3.2 oz)   12/28/07 78.9 kg (174 lb)   12/05/07 81.9 kg (180 lb 9.6 oz)   10/10/07 85.6 kg (188 lb 11.2 oz)   09/19/07 84.8 kg (187 lb)     Dosing Weight: 69 kg admission weight    ASSESSED NUTRITION NEEDS  Estimated Energy Needs: 2070+ kcals/day (30+ kcals/kg)  Justification: Increased needs and Underweight  Estimated Protein Needs: 103-138 grams protein/day (1.5 - 2 grams of pro/kg)  Justification: Increased needs and Repletion  Estimated Fluid Needs: (1 mL/kcal)   Justification: Maintenance and Per provider pending fluid status    PHYSICAL FINDINGS  See malnutrition section below.    MALNUTRITION  % Intake: Unable to assess  % Weight Loss: Unable to assess  Subcutaneous Fat Loss: Facial region:  moderate and Upper arm:  moderate  Muscle Loss: Temporal:  moderate, Thoracic region (clavicle, acromium bone, deltoid, trapezius, pectoral):  severe, Upper arm (bicep, tricep):  moderate and Upper leg (quadricep, hamstring):  moderate  Fluid Accumulation/Edema: None noted  Malnutrition Diagnosis: Severe malnutrition in the context of acute on chronic illness    NUTRITION DIAGNOSIS  Inadequate oral intake related to dysphagia, altered mentation, and recent extubation as evidenced by NPO status x at least 1 day without the start of enteral nutrition, currently meeting 0% of needs.       INTERVENTIONS  Implementation  Collaboration with other providers - Neuro Critical Care team, RN  Enteral Nutrition - Initiate     Goals  Total avg nutritional intake to meet a " minimum of 30 kcal/kg and 1.5 g PRO/kg daily (per dosing wt 69 kg).     Monitoring/Evaluation  Progress toward goals will be monitored and evaluated per protocol.    Penelope Garcia MS, RD, LD, CNSC  Weekend Pager: [phone]  Neuro ICU  ASCOM [id number]  Pager: [phone]

## 2021-05-16 NOTE — BRIEF OP NOTE
"[facility]    Brief Operative Note    Pre-operative diagnosis: Subarachnoid hemorrhage due to cerebral aneurysm (H) [I60.8]  Post-operative diagnosis Same as pre-operative diagnosis    Procedure: Procedure(s):  CRANIOTOMY, FOR ANEURYSM REPAIR  CRANIOTOMY, FOR SUBDURAL HEMATOMA EVACUATION  Surgeon: Surgeon(s) and Role:     * Jamin Martinez MD - Primary     * Katelyn Gómez MD - Resident - Assisting     * Eun Lan MD - Resident - Assisting     * Dyaron Yang MD - Resident - Assisting     * Adrian Paredes MD - Resident - Assisting     * Levi Zayas MD - Resident - Observing  Anesthesia: General   Estimated blood loss: 300 ml  Drains: Nacho-Wallis  Specimens: * No specimens in log *  Findings:   Subarachnoid hemorrhage, some intraparenchymal hemorrhage and PCOMM aneurysm.  Complications: None.  Implants:   Implant Name Type Inv. Item Serial No.  Lot No. LRB No. Used Action   GRAFT DURAGEN 2X2\"  Bone/Tissue/Biologic GRAFT DURAGEN 2X2\"   [employer] [id number] Right 1 Implanted   GRAFT DUREPAIR DURA MATRIX 3X3\" [id number] Bone/Tissue/Biologic GRAFT DUREPAIR DURA MATRIX 3X3\" [id number]  [employer]NEURO [id number] Right 1 Implanted   Angio-Seal VIP 6F 2.00mm     [id number] Right 1 Implanted   CLIP L-CLIP PERM STR 12MM 45.760 Clip CLIP L-CLIP PERM STR 12MM 45.760  LORI LEE  Right 2 Implanted   CLIP L-CLIP PERM CVD SIDEWARD 9MM 45.747 Clip CLIP L-CLIP PERM CVD SIDEWARD 9MM 45.747  LORI LEE  Right 2 Wasted           "

## 2021-05-16 NOTE — OP NOTE
Procedure Date: 05/15/2021    NEUROSURGERY OPERATIVE REPORT    SURGEON:  Jamin Martinez MD    RESIDENT ASSISTANTS:   1.  Dayron Yang MD  2.  Isaiah Gómez MD    PREOPERATIVE DIAGNOSIS:  Subarachnoid hemorrhage from a ruptured PCOM aneurysm.    POSTOPERATIVE DIAGNOSIS:  Subarachnoid hemorrhage from a ruptured PCOM aneurysm.    PROCEDURE PERFORMED:  1.  Pterional craniotomy.  2.  Microscopic dissection for clipping of aneurysm.  3.  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  4.  Evacuation of intracerebral hemorrhage.  5.  Intraoperative cerebral angiogram, which will be dictated separately.    ANESTHESIA:  General endotracheal anesthesia.    ESTIMATED BLOOD LOSS:  250 mL    IMPLANTS:  Two Peter Lazic L-series aneurysm clips.    COMPLICATIONS:  None immediately apparent.    PREOPERATIVE HISTORY:  Dayron is a 70-year-old gentleman who presented to [facility] today with a subarachnoid hemorrhage from a ruptured PCOM aneurysm.  He was a Hunt and Walton grade 5 and had a large subarachnoid hemorrhage and intraparenchymal hematoma.  Because of the intraparenchymal hematoma, mass effect, blown pupil, we elected to take him to the operating room for evacuation of the hematoma and clipping of the aneurysm.  I discussed all this with his wife ahead of time.  We discussed risks and benefits and treatment options, including endovascular and surgical treatments.  She has agreed with the surgical plan and signed an informed written consent.    DESCRIPTION OF PROCEDURE:  Matt was brought to the operating room and positioned in the supine fashion on the operative table.  His head was connected to the bed using the Baltic headholder and positioned rotated 30 degrees to the left.  We shaved along the craniotomy site and then this was prepped and draped in the usual sterile fashion.    After a timeout, we then proceeded to open the skin incision using a #10 scalpel blade.  This was then further opened using the  monopolar.  Maeve clips were placed.  Hemostasis was achieved.  We reflected the skin forward.  The temporalis muscle was then separately reflected forward.  We then used an electric drill with a #5 ball cutting tip.  We made several kwame holes and then stripped the dura underneath the kwame holes.  We then used a craniotome; we connected the kwame holes to perform our craniotomy.  We removed the bone and then we proceeded to drill down the sphenoid ridge.    We opened the dura with a 15 blade and then Metzenbaum scissors.  The dura was reflected anterior.  At that point in time I brought in the surgical microscope and then proceeded with the microscopic dissection.  I dissected down and identified the optic nerve.  I then opened the arachnoid over the optic nerve and the carotid.  At that point in time, given the hematoma and mass effect, I then opened the sylvian fissure.  After opening the sylvian fissure I then identified the clot in the temporal lobe and evacuated the clot.  I then turned my attention to the aneurysm and found the proximal and distal portion of the neck of the aneurysm.  Under temporary arrest of the internal carotid artery with a clip I then placed 2 clips across the neck of the PCOM aneurysm.    We then performed an intraoperative cerebral angiogram.  This demonstrated the aneurysm was gone and the posterior communicating artery was open as was the internal carotid artery.    We then irrigated with copious amounts of antibiotic irrigation and then began closing the dura.  We placed a piece of Durepair down and then closed as much of the dura as we could primarily and then the remainder was closed using the Durepair.  We put a piece of Duragen on top of that and then replaced the bone, which was secured in place by titanium plates.  We placed a 10 drain and then closed the galea with 2-0 interrupted sutures and then a running nylon on the skin.    I attest I was present for the key portions of  the procedure and immediately available for the remainder.      Jamin Martinez MD        D: 05/15/2021   T: 05/15/2021   MT: [facility]    Name:     TOD WHITING  MRN:      [id number]        Account:        [id number]   :      1950           Procedure Date: 05/15/2021     Document: [id number]

## 2021-05-16 NOTE — SIGNIFICANT EVENT
SPIRITUAL HEALTH SERVICES Significant Event  [employer] of ANOINTING  [facility] ([facility] 4a    Pt anointed by Father Ana Blanca   Pager [phone]

## 2021-05-16 NOTE — PLAN OF CARE
SLP: Pt intubated.  Please re consult SLP following extubation as appropriate.  SLP to complete order.

## 2021-05-16 NOTE — PLAN OF CARE
Neuro: Neuros q 1 hr. Pupils +3 brisk and reactive.  Follows a few simple commands. Moves all 4 extremities spontaneously. EVD 10 about EAM. ICP 6-12. Output 1-10. Output red in color. Restraints in place. Mitts on. Palpable pulses  Cardiac: Sinus natalia/rhythm. Lowest HR noted was 39. MD notified. Precedex dc'd. HR mostly 50s. Occasional 60s.   Respiratory: Extubated at 1330. Currently 4L NC. Lungs clear, bases diminished.  GI: Hypoactive bowel sounds. - flatus. Small bore NGT placed at bedside for  medications. Speech to assess swallow tomorrow.  : Adequate UOP via fournier cath. Urine jose alfredo in color.  IV:  R piv x2. L arm piv x1  Pain: Scheduled tylenol and oxycodone prn.  Skin:  R head incision covered, L Head WENDY with yue red output.   PLAN:  Continue to monitor . Notify MD with any acute changes.

## 2021-05-16 NOTE — ANESTHESIA CARE TRANSFER NOTE
Patient: Dayron Neri    Procedure(s):  CRANIOTOMY, FOR ANEURYSM REPAIR  CRANIOTOMY, FOR SUBDURAL HEMATOMA EVACUATION    Diagnosis: Subarachnoid hemorrhage due to cerebral aneurysm (H) [I60.8]  Diagnosis Additional Information: No value filed.    Anesthesia Type:   General     Note:    Oropharynx: endotracheal tube in place and ventilatory support  Level of Consciousness: iatrogenic sedation      Independent Airway: airway patency not satisfactory and stable  Dentition: dentition unchanged  Vital Signs Stable: post-procedure vital signs reviewed and stable    Patient transferred to: ICU    ICU Handoff: Call for PAUSE to initiate/utilize ICU HANDOFF, Identified Patient, Identified Responsible Provider, Reviewed the Pertinent Medical History, Discussed Surgical Course, Reviewed Intra-OP Anesthesia Management and Issues during Anesthesia, Set Expectations for Post Procedure Period and Allowed Opportunity for Questions and Acknowledgement of Understanding      Vitals: (Last set prior to Anesthesia Care Transfer)  CRNA VITALS  5/15/2021 1940 - 5/15/2021 2040      5/15/2021             EKG:  Sinus rhythm        Electronically Signed By: Jamin Kohli MD  May 15, 2021  8:45 PM

## 2021-05-16 NOTE — PLAN OF CARE
4AB PT cx: Patient with other providers at time of first attempt and then out of room at second attempt. Will reschedule.

## 2021-05-16 NOTE — PLAN OF CARE
Major Shift Events: Return from OR approx 2100 following hematoma evacuation and aneurysm clipping. EVD @ 10 above EAM, ICP's 4-13, output 0-15, flushed X2 by provider. WENDY output approx 30ml Q4H. Lightly sedated, arouses to pain, SANDERS, does not follow commands, PERRLA +3. SR HR 60's, SBP maintained < 140 without intervention. Minimal vent settings. OG placed for PO meds. AUOP via fournier. Wife at bedside post-op.   Plan: Continue with plan of care   For vital signs and complete assessments, please see documentation flowsheets.      Lori Mckeon RN on 5/16/2021 at 5:30 AM    Off sedation for 20 min, pt spontaneously moving BUE.     Lori Mckeon RN on 5/16/2021 at 6:50 AM

## 2021-05-16 NOTE — PLAN OF CARE
4A OT Cancel/Hold. Pt not consistently following commands, PT to follow to address functional mobility, ROM. Will hold at this time and initiate as indicated.

## 2021-05-16 NOTE — PROCEDURES
Small Bowel Feeding Tube Placement Assessment  Reason for Feeding Tube Placement: gastric access okay, FT placement due to failed attempts at large-bore NGT placement and need for medication administration and TF  Cortrak Start Time: 1500   Cortrak End Time: 1510  Medicine Delivered During Procedure: lidocaine gel  Placement Successful:  gastric presumed per Cortrak (AXR pending) - gastric access was goal    Procedure Complications: pt very agitated, required RN to assist in hold pt still; FT coiling in stomach, moderate epistaxis  Final Placement Vladimir at exit of nare 80 cm  Face to Face time with patient: 20 minutes total    Bridle Placement:   Reason for bridle placement: securement of FT   Medicine delivered during procedure: lidocaine gel   Procedure: Successful  Location of top of clip on FT: @ 82 cm marker   Condition of nose/skin at time of bridle placement: moderate epistaxis   Face to Face time with patient: 5 minutes.    Penelope Garcia, MS, RD, LD, [facility]  Neuro ICU  ASCOM [id number]  Pager: [phone]

## 2021-05-16 NOTE — PROGRESS NOTES
Neurosurgery progress note    S: s/p EVD placement, DCA, and OR for aneurysm clipping yesterday    O:  Temp:  [94.6  F (34.8  C)-99.5  F (37.5  C)] 99  F (37.2  C)  Pulse:  [] 65  Resp:  [8-35] 17  BP: (108-248)/() 108/73  MAP:  [79 mmHg-101 mmHg] 98 mmHg  Arterial Line BP: (115-141)/(64-88) 141/76  FiO2 (%):  [30 %-60 %] 30 %  SpO2:  [92 %-100 %] 99 %    Exam:  Intubated sedated  Open eyes to stimulation  4mm-->3mm briskly reactive equal  Grimace to pain  Follow commands by showing thumbs  Spontaneous x4      ASSESSMENT: 70 year old man presented with AMS found to have aSAH with right supraclinoid aneurysm. mFS4 and HH5. GCS 5. S/p EVD placement, DCA, OR for aneurysm clipping on 5/16     The following conditions are also present, complicating the patient's current management, as described in the Assessment and Plan:   mechanical ventilation on day of admission, intracerebral hematoma, brain compression, acute respiratory failure, coma, based on current GCS of 5 and cerebral aneurysm rupture with hunt sims scale 5 modified fisser 4        RECOMMENDATIONS:  - EVD @ 10  - CTA CTP  - Q1hr neuro exam  - SBP<140  - HOB > 30 degrees  - Maintain SBP < 120  - Continuous cardiac monitoring while in ICU   - NPO   - Nimodipine  - Glucose < 180  - Continue glucose checks  - Platelets > 100,000  - INR < 1.5  - Hemoglobin > 8  - DVT: SCDs while in bed  - Disposition: 4A  - PT/OT consulted      Adrian Paredes MD  Neurosurgery Resident

## 2021-05-16 NOTE — PROGRESS NOTES
Patient suctioned and electively extubated per physician order. Placed on 5 L-NC, breath sounds were coarse, labs pending. Patient tolerated procedure well without any immediate complications.    Marc Bernstein, RT, RT  5/16/2021 1:53 PM

## 2021-05-16 NOTE — PROGRESS NOTES
Patient was transported to  Scan on Vent Patient tolerated well.  Transport was done without complications.  When returned to room was placed back on ventilator on the following settings:        Marc Bernstein, RT

## 2021-05-16 NOTE — PHARMACY-CONSULT NOTE
Pharmacy Tube Feeding Consult    Medication reviewed for administration by feeding tube and for potential food/drug interactions.    Recommendation: No changes are needed at this time.     Pharmacy will continue to follow as new medications are ordered.    Avery JusticeD

## 2021-05-16 NOTE — PROGRESS NOTES
Neuroscience Intensive Care Progress Note    Problem List  1.  Aneurysmal subarachnoid hemorrhage, HH 5, MF 4, bleed day 5/15  2.  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  Hydrocephalus status post EVD 5/15  4.  Hypertension  5.  Acute hypoxic respiratory failure  6.  Hypernatremia  7.  Hyperchloremia  8.  Leukocytosis  9.  Normocytic anemia    24 hour events:  -Admitted for treatment of aneurysmal subarachnoid hemorrhage  -Taken to interventional radiology for angiogram  -Taken to the operating room for clipping of right P-comm aneurysm    24 Hour Vital Signs Summary:  Temperatures:  Current - Temp: 98.4  F (36.9  C); Max - Temp  Av.5  F (36.9  C)  Min: 94.6  F (34.8  C)  Max: 99.5  F (37.5  C)  Respiration range: Resp  Av.6  Min: 8  Max: 35  Pulse range: Pulse  Av.6  Min: 41  Max: 129  Blood pressure range: Systolic (24hrs), Av , Min:108 , Max:248   ; Diastolic (24hrs), Av, Min:73, Max:200    Pulse oximetry range: SpO2  Av.1 %  Min: 92 %  Max: 100 %    Ventilator Settings  Ventilation Mode: CMV/AC  (Continuous Mandatory Ventilation/ Assist Control)  FiO2 (%): 30 %  Rate Set (breaths/minute): (S) 16 breaths/min (per MD)  Tidal Volume Set (mL): 450 mL  PEEP (cm H2O): 5 cmH2O  Oxygen Concentration (%): 30 %  Peak Inspiratory Pressure (cm H2O) (Drager Marifer): 11  Resp: 18    Intake/Output Summary (Last 24 hours) at 2021 0729  Last data filed at 2021 0700  Gross per 24 hour   Intake 2801.84 ml   Output 3537 ml   Net -735.16 ml       Arterial Line BP: (115-141)/(64-88) 140/69  MAP:  [79 mmHg-101 mmHg] 93 mmHg  BP - Mean:  [] 87    Current Medications:    niMODipine  60 mg Oral or Feeding Tube Q4H MARAH    And     sodium chloride 0.9 %  10 mL Oral or Feeding Tube Q4H MARAH       PRN Medications:  fentaNYL, hydrALAZINE, labetalol, ondansetron **OR** ondansetron    Infusions:    fentaNYL Stopped (21 8834)     niCARdipine Stopped (05/15/21 4320)      "Plasma-Lyte A 100 mL/hr (05/16/21 0159)     propofol (DIPRIVAN) infusion Stopped (05/16/21 0630)       No Known Allergies    Physical Examination:  /73 (BP Location: Left arm)   Pulse 69   Temp 98.4  F (36.9  C)   Resp 18   Ht 1.803 m (5' 11\")   Wt 69 kg (152 lb 1.9 oz)   SpO2 98%   BMI 21.22 kg/m      EXAM:  Examined off sedation. Mr Neri is examined in the supine position while intubated.  He opens his eyes to gentle touch and follows commands by wiggling toes.  Pupils are symmetric and react to light.  No facial asymmetry.  His arms moves spontaneously and with purpose with antigravity strength.  Sensation appears grossly intact by grimace to noxious stimulus.  Intubated on mechanical ventilation.  Lung sounds clear.  Regular cardiac rhythm.  Soft abdomen with bowel sounds.  Groin site clean without evidence of hematoma.  No peripheral edema.    Labs/Studies:  Recent Labs   Lab Test 05/16/21  0405 05/15/21  2125 05/15/21  1526 05/15/21  1311 05/15/21  1222   * 146* 144 139 131*   POTASSIUM 3.5 3.8 3.7 3.2* 4.1   CHLORIDE 115* 113*  --  109 103   CO2 27 27  --  24 22   ANIONGAP 6 5  --  6 6   * 142* 125* 156* 215*   BUN 16 16  --  14 15   CR 0.88 0.99  --  0.78 0.78   RAGINI 8.9 8.8  --  7.4* 8.3*   WBC 15.0* 12.3*  --   --  16.5*   RBC 3.74* 3.85*  --   --  4.43   HGB 11.7* 11.8* 13.1*  --  13.6    239  --   --  257       Recent Labs   Lab Test 05/15/21  2125 05/15/21  1222 05/15/21  0930   INR 1.20* 1.16* 1.07   PTT 30 29 27       Recent Labs   Lab 05/15/21  1526 05/15/21  1222   PH 7.46* 7.39   PCO2 33* 38   PO2 145* 269*   HCO3 23 23   O2PER 40.0 60       Imaging and labs reviewed personally in the EMR    Assessment:  Mr Neri is a 70-year-old gentleman currently admitted for management of aneurysmal subarachnoid hemorrhage.  He is now post bleed day 1, postop day 1 for right P-comm aneurysm clipping.  He is doing well today, following commands and was successfully extubated. "  Plan for close monitoring for neurologic changes associated with vasospasm and/or worsening hydrocephalus.      Plan:  Neuro:   #Aneurysmal subarachnoid hemorrhage, HH 5, MF 4, bleed day 5/15  #Ruptured right P-comm aneurysm status post clipping 5/15  #Hydrocephalus status post EVD 5/15  -Hourly neurochecks  -Head of bed elevated  -Nimodipine  -Stop Keppra, aneurysm secured  -Normothermia  -Euglycemia  -Normonatremia  -Daily TCD  -Baseline CTA/CTP  -SBP less than 160    #pain management  - scheduled acetaminophen    Resp:   #Acute hypoxic respiratory failure  -Extubated today  -Supplemental oxygen available as needed  -Pulse oximetry    CV:   #Hypertension, relative  -SBP less than 160  -Nicardipine drip if needed  -Labetalol and hydralazine IV if needed  -We will hold the longer acting p.o. medications during vasospasm watch    Renal:   #Hypernatremia  #Hyperchloremia  -Goal normonatremia  -Will permit some hypernatremia  -Monitor BMP  -Plasma-Lyte    Endo: No active issues.  Monitor glucose.  Hypoglycemia protocol    Heme:   #Leukocytosis, suspect reactive, no focal sign of infection  #Normocytic anemia  -Monitor CBC and vital signs    GI:   #nutrition  -May need to adjust position of feeding tube  -Okay to use feeding tube for medications  -We will initiate tube feeding tomorrow after feeding tube relocation  -Nutrition services consulted  -Free water flush    ID: No active issues.  Monitor leukocytosis and vital signs.  Monitor CBC    PPX:    DVT: SCDs, chemoprophylaxis contraindicated in setting of acute intracranial hemorrhage    GI: Not indicated    Lines and Tubes:  -Right radial arterial line 5/15  -Left frontal EVD 5/15  -Left subclavian triple-lumen catheter 5/15  -Indwelling urinary catheter 5/15  -Nasogastric tube 5/16    Code Status: Full code    Dispo: 4A ICU. He will require ICU care for at least 1 to 2 weeks.    This patient was seen and discussed with attending neurointensivist,   Vinny Fitzpatrick DO  Neurocritical Care Fellow  Team [facility] *[id number]  05/16/2021

## 2021-05-16 NOTE — CONSULTS
Care Management Initial Consult    General Information  Assessment completed with: Spouse or significant other, Susy  Type of CM/SW Visit: Initial Assessment    Primary Care Provider verified and updated as needed: Yes   Readmission within the last 30 days:        Reason for Consult: other (see comments)(Stroke)  Advance Care Planning: Advance Care Planning Reviewed: education/resources on health care directives provided        Communication Assessment  Patient's communication style: spoken language (English or Bilingual)      Living Environment:   People in home: spouse     Current living Arrangements: house      Able to return to prior arrangements:    Living Arrangement Comments: Split level home with 7 stairs up to bed/bath; 3 stairs to front door, 5 stairs down to basement    Family/Social Support:  Marital Status:   Primary Supports: Wife Susy, daughter Yolanda Epps and son-in-law Juan Epps  Description of Support System: Supportive, Involved    Support Assessment: Adequate family and caregiver support    Employment/Financial:  Employment Status:  retired  Financial Concerns: No concerns identified      Lifestyle & Psychosocial Needs:        Socioeconomic History     Marital status:      Spouse name: Not on file     Number of children: Not on file     Years of education: Not on file     Highest education level: Not on file     Tobacco Use     Smoking status: Never Smoker     Smokeless tobacco: Never Used   Substance and Sexual Activity     Alcohol use: Yes     Comment: moderate use     Drug use: No     Sexual activity: Not Currently       Functional Status:  Prior to admission patient needed assistance: did not assess today.     Mental Health Status:  Mental Health Status: No Current Concerns; Susy notes a history of depression and some concern for needed mood support related to this medical event and it's impact on his life moving forward.     Chemical Dependency Status:  Chemical  Dependency Status: Past Concern  Chemical Dependency Management: former Alcohol Use disorder; 10+ years sobriety    Values/Beliefs:  Spiritual, Cultural Beliefs, Judaism Practices, Values that affect care: yes       Cultural/Judaism Practices Patient Routinely Participates In: prayer  Values/Beliefs Comment: Buddhism    Additional Information:  MARY KATE spoke to spouse Susy by phone who had just returned home after 22 hours in the hospital.  Susy was notably exhausted so first assessment kept brief with a plan for social work to follow up if additional needs arise.  Susy shared that she has strong family and friend supports in place.  Susy stated that she plans to return to the hospital this coming Wednesday, 5/19 but can be reached by phone until then.  Suggests using home number first ([phone]).  Susy expressed some concern regarding insurance coverage of ambulance rides to hospital and between during hospital transfers.  SW encourage Susy to call [employer]'s [employer] insurance directly for copay information and provided her with contact info for the Billing Department/Financial Services for further support, if needed.  Susy is aware of how to reach out to social work this week for questions and/or support.      Susy share that [facility] has no history of TCU or Rehab stays.    MARY KATE team will continue to follow.    JOE Fuentes, FADIA  Weekend Adult Acute Care     [facility] on [facility] - search SOCIAL WORK - for text paging options    4A, 4C, 4E, 5A and 5B; pager [phone]  6A, 6B, 6C and 6D; pager [phone]  7A, 7B, 7C, 7D and 5C; pager [phone]  [facility] pager: [phone]     RNCC/Care Coordinator; job code [id number] or [phone]    -For hours 1600 - midnight Pager: [phone]    FADIA Cutler

## 2021-05-17 ENCOUNTER — APPOINTMENT (OUTPATIENT)
Dept: ULTRASOUND IMAGING | Facility: CLINIC | Age: 71
DRG: 003 | End: 2021-05-17
Attending: STUDENT IN AN ORGANIZED HEALTH CARE EDUCATION/TRAINING PROGRAM
Payer: COMMERCIAL

## 2021-05-17 ENCOUNTER — APPOINTMENT (OUTPATIENT)
Dept: GENERAL RADIOLOGY | Facility: CLINIC | Age: 71
DRG: 003 | End: 2021-05-17
Attending: STUDENT IN AN ORGANIZED HEALTH CARE EDUCATION/TRAINING PROGRAM
Payer: COMMERCIAL

## 2021-05-17 ENCOUNTER — APPOINTMENT (OUTPATIENT)
Dept: CARDIOLOGY | Facility: CLINIC | Age: 71
DRG: 003 | End: 2021-05-17
Attending: STUDENT IN AN ORGANIZED HEALTH CARE EDUCATION/TRAINING PROGRAM
Payer: COMMERCIAL

## 2021-05-17 LAB
ANION GAP SERPL CALCULATED.3IONS-SCNC: 3 MMOL/L (ref 3–14)
BASOPHILS # BLD AUTO: 0 10E9/L (ref 0–0.2)
BASOPHILS NFR BLD AUTO: 0.2 %
BUN SERPL-MCNC: 16 MG/DL (ref 7–30)
CALCIUM SERPL-MCNC: 9.1 MG/DL (ref 8.5–10.1)
CHLORIDE SERPL-SCNC: 118 MMOL/L (ref 94–109)
CO2 SERPL-SCNC: 28 MMOL/L (ref 20–32)
CREAT SERPL-MCNC: 0.66 MG/DL (ref 0.66–1.25)
DIFFERENTIAL METHOD BLD: ABNORMAL
EOSINOPHIL # BLD AUTO: 0 10E9/L (ref 0–0.7)
EOSINOPHIL NFR BLD AUTO: 0 %
ERYTHROCYTE [DISTWIDTH] IN BLOOD BY AUTOMATED COUNT: 12.9 % (ref 10–15)
GFR SERPL CREATININE-BSD FRML MDRD: >90 ML/MIN/{1.73_M2}
GLUCOSE BLDC GLUCOMTR-MCNC: 130 MG/DL (ref 70–99)
GLUCOSE BLDC GLUCOMTR-MCNC: 131 MG/DL (ref 70–99)
GLUCOSE BLDC GLUCOMTR-MCNC: 137 MG/DL (ref 70–99)
GLUCOSE BLDC GLUCOMTR-MCNC: 51 MG/DL (ref 70–99)
GLUCOSE BLDC GLUCOMTR-MCNC: 72 MG/DL (ref 70–99)
GLUCOSE BLDC GLUCOMTR-MCNC: 77 MG/DL (ref 70–99)
GLUCOSE BLDC GLUCOMTR-MCNC: 84 MG/DL (ref 70–99)
GLUCOSE SERPL-MCNC: 138 MG/DL (ref 70–99)
HCT VFR BLD AUTO: 34 % (ref 40–53)
HGB BLD-MCNC: 11.3 G/DL (ref 13.3–17.7)
IMM GRANULOCYTES # BLD: 0.1 10E9/L (ref 0–0.4)
IMM GRANULOCYTES NFR BLD: 0.8 %
INTERPRETATION ECG - MUSE: NORMAL
INTERPRETATION ECG - MUSE: NORMAL
LYMPHOCYTES # BLD AUTO: 0.7 10E9/L (ref 0.8–5.3)
LYMPHOCYTES NFR BLD AUTO: 4 %
MAGNESIUM SERPL-MCNC: 2.4 MG/DL (ref 1.6–2.3)
MAGNESIUM SERPL-MCNC: 2.5 MG/DL (ref 1.6–2.3)
MCH RBC QN AUTO: 31.3 PG (ref 26.5–33)
MCHC RBC AUTO-ENTMCNC: 33.2 G/DL (ref 31.5–36.5)
MCV RBC AUTO: 94 FL (ref 78–100)
MONOCYTES # BLD AUTO: 0.9 10E9/L (ref 0–1.3)
MONOCYTES NFR BLD AUTO: 4.9 %
NEUTROPHILS # BLD AUTO: 16.1 10E9/L (ref 1.6–8.3)
NEUTROPHILS NFR BLD AUTO: 90.1 %
PHOSPHATE SERPL-MCNC: 1.7 MG/DL (ref 2.5–4.5)
PHOSPHATE SERPL-MCNC: 1.8 MG/DL (ref 2.5–4.5)
PLATELET # BLD AUTO: 218 10E9/L (ref 150–450)
POTASSIUM SERPL-SCNC: 3 MMOL/L (ref 3.4–5.3)
POTASSIUM SERPL-SCNC: 3.2 MMOL/L (ref 3.4–5.3)
POTASSIUM SERPL-SCNC: 3.4 MMOL/L (ref 3.4–5.3)
PROCALCITONIN SERPL-MCNC: 0.13 NG/ML
RBC # BLD AUTO: 3.61 10E12/L (ref 4.4–5.9)
SODIUM SERPL-SCNC: 149 MMOL/L (ref 133–144)
WBC # BLD AUTO: 20.1 10E9/L (ref 4–11)

## 2021-05-17 PROCEDURE — 93886 INTRACRANIAL COMPLETE STUDY: CPT

## 2021-05-17 PROCEDURE — 99291 CRITICAL CARE FIRST HOUR: CPT | Mod: GC | Performed by: PSYCHIATRY & NEUROLOGY

## 2021-05-17 PROCEDURE — 83735 ASSAY OF MAGNESIUM: CPT | Performed by: NEUROLOGICAL SURGERY

## 2021-05-17 PROCEDURE — 250N000013 HC RX MED GY IP 250 OP 250 PS 637: Performed by: STUDENT IN AN ORGANIZED HEALTH CARE EDUCATION/TRAINING PROGRAM

## 2021-05-17 PROCEDURE — 250N000013 HC RX MED GY IP 250 OP 250 PS 637: Performed by: NEUROLOGICAL SURGERY

## 2021-05-17 PROCEDURE — 250N000011 HC RX IP 250 OP 636: Performed by: STUDENT IN AN ORGANIZED HEALTH CARE EDUCATION/TRAINING PROGRAM

## 2021-05-17 PROCEDURE — 84100 ASSAY OF PHOSPHORUS: CPT | Performed by: STUDENT IN AN ORGANIZED HEALTH CARE EDUCATION/TRAINING PROGRAM

## 2021-05-17 PROCEDURE — 999N000076 HC STATISTIC ICP MONITORING

## 2021-05-17 PROCEDURE — 85027 COMPLETE CBC AUTOMATED: CPT | Performed by: STUDENT IN AN ORGANIZED HEALTH CARE EDUCATION/TRAINING PROGRAM

## 2021-05-17 PROCEDURE — 83735 ASSAY OF MAGNESIUM: CPT | Performed by: STUDENT IN AN ORGANIZED HEALTH CARE EDUCATION/TRAINING PROGRAM

## 2021-05-17 PROCEDURE — 999N000065 XR ABDOMEN PORT 1 VIEWS

## 2021-05-17 PROCEDURE — 85004 AUTOMATED DIFF WBC COUNT: CPT | Performed by: STUDENT IN AN ORGANIZED HEALTH CARE EDUCATION/TRAINING PROGRAM

## 2021-05-17 PROCEDURE — 93306 TTE W/DOPPLER COMPLETE: CPT | Mod: 26 | Performed by: INTERNAL MEDICINE

## 2021-05-17 PROCEDURE — 258N000003 HC RX IP 258 OP 636: Performed by: STUDENT IN AN ORGANIZED HEALTH CARE EDUCATION/TRAINING PROGRAM

## 2021-05-17 PROCEDURE — 999N000015 HC STATISTIC ARTERIAL MONITORING DAILY

## 2021-05-17 PROCEDURE — 250N000009 HC RX 250: Performed by: STUDENT IN AN ORGANIZED HEALTH CARE EDUCATION/TRAINING PROGRAM

## 2021-05-17 PROCEDURE — 74018 RADEX ABDOMEN 1 VIEW: CPT | Mod: 26 | Performed by: RADIOLOGY

## 2021-05-17 PROCEDURE — 93306 TTE W/DOPPLER COMPLETE: CPT

## 2021-05-17 PROCEDURE — 84132 ASSAY OF SERUM POTASSIUM: CPT | Performed by: NEUROLOGICAL SURGERY

## 2021-05-17 PROCEDURE — 80048 BASIC METABOLIC PNL TOTAL CA: CPT | Performed by: STUDENT IN AN ORGANIZED HEALTH CARE EDUCATION/TRAINING PROGRAM

## 2021-05-17 PROCEDURE — 250N000013 HC RX MED GY IP 250 OP 250 PS 637: Performed by: PSYCHIATRY & NEUROLOGY

## 2021-05-17 PROCEDURE — 84145 PROCALCITONIN (PCT): CPT | Performed by: STUDENT IN AN ORGANIZED HEALTH CARE EDUCATION/TRAINING PROGRAM

## 2021-05-17 PROCEDURE — 999N000157 HC STATISTIC RCP TIME EA 10 MIN

## 2021-05-17 PROCEDURE — 250N000009 HC RX 250: Performed by: PSYCHIATRY & NEUROLOGY

## 2021-05-17 PROCEDURE — 999N001017 HC STATISTIC GLUCOSE BY METER IP

## 2021-05-17 PROCEDURE — 258N000001 HC RX 258

## 2021-05-17 PROCEDURE — 93886 INTRACRANIAL COMPLETE STUDY: CPT | Mod: 26 | Performed by: STUDENT IN AN ORGANIZED HEALTH CARE EDUCATION/TRAINING PROGRAM

## 2021-05-17 PROCEDURE — 200N000002 HC R&B ICU UMMC

## 2021-05-17 PROCEDURE — 84100 ASSAY OF PHOSPHORUS: CPT | Performed by: NEUROLOGICAL SURGERY

## 2021-05-17 RX ORDER — HYDRALAZINE HYDROCHLORIDE 20 MG/ML
10-20 INJECTION INTRAMUSCULAR; INTRAVENOUS
Status: DISCONTINUED | OUTPATIENT
Start: 2021-05-17 | End: 2021-05-18

## 2021-05-17 RX ORDER — QUETIAPINE FUMARATE 25 MG/1
25 TABLET, FILM COATED ORAL EVERY 8 HOURS PRN
Status: DISCONTINUED | OUTPATIENT
Start: 2021-05-17 | End: 2021-05-19

## 2021-05-17 RX ORDER — LABETALOL HYDROCHLORIDE 5 MG/ML
10-20 INJECTION, SOLUTION INTRAVENOUS EVERY 10 MIN PRN
Status: DISCONTINUED | OUTPATIENT
Start: 2021-05-17 | End: 2021-05-18

## 2021-05-17 RX ORDER — BISACODYL 10 MG
10 SUPPOSITORY, RECTAL RECTAL DAILY PRN
Status: DISCONTINUED | OUTPATIENT
Start: 2021-05-17 | End: 2021-06-11 | Stop reason: HOSPADM

## 2021-05-17 RX ORDER — POTASSIUM CHLORIDE 1.5 G/1.58G
40 POWDER, FOR SOLUTION ORAL ONCE
Status: COMPLETED | OUTPATIENT
Start: 2021-05-17 | End: 2021-05-17

## 2021-05-17 RX ORDER — AMOXICILLIN 250 MG
1 CAPSULE ORAL 2 TIMES DAILY
Status: DISCONTINUED | OUTPATIENT
Start: 2021-05-17 | End: 2021-06-11 | Stop reason: HOSPADM

## 2021-05-17 RX ORDER — DEXTROSE MONOHYDRATE 25 G/50ML
INJECTION, SOLUTION INTRAVENOUS
Status: COMPLETED
Start: 2021-05-17 | End: 2021-05-17

## 2021-05-17 RX ORDER — POTASSIUM CHLORIDE 1.5 G/1.58G
20 POWDER, FOR SOLUTION ORAL ONCE
Status: COMPLETED | OUTPATIENT
Start: 2021-05-17 | End: 2021-05-17

## 2021-05-17 RX ORDER — POLYETHYLENE GLYCOL 3350 17 G/17G
17 POWDER, FOR SOLUTION ORAL DAILY
Status: DISCONTINUED | OUTPATIENT
Start: 2021-05-17 | End: 2021-06-10

## 2021-05-17 RX ORDER — POLYETHYLENE GLYCOL 3350 17 G/17G
17 POWDER, FOR SOLUTION ORAL DAILY
Status: DISCONTINUED | OUTPATIENT
Start: 2021-05-17 | End: 2021-05-17

## 2021-05-17 RX ORDER — AMOXICILLIN 250 MG
2 CAPSULE ORAL 2 TIMES DAILY
Status: DISCONTINUED | OUTPATIENT
Start: 2021-05-17 | End: 2021-06-11 | Stop reason: HOSPADM

## 2021-05-17 RX ORDER — AMOXICILLIN 250 MG
1 CAPSULE ORAL AT BEDTIME
Status: DISCONTINUED | OUTPATIENT
Start: 2021-05-17 | End: 2021-05-17

## 2021-05-17 RX ADMIN — QUETIAPINE FUMARATE 25 MG: 25 TABLET ORAL at 15:39

## 2021-05-17 RX ADMIN — OXYCODONE HYDROCHLORIDE 5 MG: 5 TABLET ORAL at 07:41

## 2021-05-17 RX ADMIN — NIMODIPINE 60 MG: 30 SOLUTION ORAL at 07:41

## 2021-05-17 RX ADMIN — NIMODIPINE 60 MG: 30 SOLUTION ORAL at 12:03

## 2021-05-17 RX ADMIN — Medication 10 ML: at 15:39

## 2021-05-17 RX ADMIN — QUETIAPINE FUMARATE 25 MG: 25 TABLET ORAL at 23:47

## 2021-05-17 RX ADMIN — HYDRALAZINE HYDROCHLORIDE 20 MG: 20 INJECTION INTRAMUSCULAR; INTRAVENOUS at 14:53

## 2021-05-17 RX ADMIN — DEXTROSE MONOHYDRATE 50 ML: 500 INJECTION PARENTERAL at 12:16

## 2021-05-17 RX ADMIN — NIMODIPINE 60 MG: 30 SOLUTION ORAL at 23:42

## 2021-05-17 RX ADMIN — Medication 10 ML: at 23:43

## 2021-05-17 RX ADMIN — NIMODIPINE 60 MG: 30 SOLUTION ORAL at 03:00

## 2021-05-17 RX ADMIN — Medication 10 ML: at 07:42

## 2021-05-17 RX ADMIN — POTASSIUM CHLORIDE 40 MEQ: 1.5 POWDER, FOR SOLUTION ORAL at 06:23

## 2021-05-17 RX ADMIN — POTASSIUM CHLORIDE 20 MEQ: 1.5 POWDER, FOR SOLUTION ORAL at 17:05

## 2021-05-17 RX ADMIN — NIMODIPINE 60 MG: 30 SOLUTION ORAL at 15:39

## 2021-05-17 RX ADMIN — POTASSIUM PHOSPHATE, MONOBASIC AND POTASSIUM PHOSPHATE, DIBASIC 15 MMOL: 224; 236 INJECTION, SOLUTION INTRAVENOUS at 06:22

## 2021-05-17 RX ADMIN — QUETIAPINE FUMARATE 25 MG: 25 TABLET ORAL at 07:41

## 2021-05-17 RX ADMIN — POTASSIUM & SODIUM PHOSPHATES POWDER PACK 280-160-250 MG 2 PACKET: 280-160-250 PACK at 21:28

## 2021-05-17 RX ADMIN — ACETAMINOPHEN 975 MG: 325 TABLET, FILM COATED ORAL at 00:29

## 2021-05-17 RX ADMIN — HYDRALAZINE HYDROCHLORIDE 20 MG: 20 INJECTION INTRAMUSCULAR; INTRAVENOUS at 06:58

## 2021-05-17 RX ADMIN — SODIUM CHLORIDE, SODIUM GLUCONATE, SODIUM ACETATE, POTASSIUM CHLORIDE AND MAGNESIUM CHLORIDE 100 ML/HR: 526; 502; 368; 37; 30 INJECTION, SOLUTION INTRAVENOUS at 07:51

## 2021-05-17 RX ADMIN — NIMODIPINE 60 MG: 30 SOLUTION ORAL at 00:30

## 2021-05-17 RX ADMIN — Medication 10 ML: at 19:30

## 2021-05-17 RX ADMIN — NIMODIPINE 60 MG: 30 SOLUTION ORAL at 19:30

## 2021-05-17 RX ADMIN — HYDRALAZINE HYDROCHLORIDE 10 MG: 20 INJECTION INTRAMUSCULAR; INTRAVENOUS at 23:42

## 2021-05-17 RX ADMIN — OXYCODONE HYDROCHLORIDE 5 MG: 5 TABLET ORAL at 12:03

## 2021-05-17 RX ADMIN — OXYCODONE HYDROCHLORIDE 5 MG: 5 TABLET ORAL at 15:39

## 2021-05-17 RX ADMIN — POTASSIUM & SODIUM PHOSPHATES POWDER PACK 280-160-250 MG 2 PACKET: 280-160-250 PACK at 15:39

## 2021-05-17 RX ADMIN — OXYCODONE HYDROCHLORIDE 5 MG: 5 TABLET ORAL at 00:29

## 2021-05-17 RX ADMIN — DOCUSATE SODIUM 50 MG AND SENNOSIDES 8.6 MG 1 TABLET: 8.6; 5 TABLET, FILM COATED ORAL at 12:03

## 2021-05-17 RX ADMIN — ACETAMINOPHEN 975 MG: 325 TABLET, FILM COATED ORAL at 23:42

## 2021-05-17 RX ADMIN — Medication 10 ML: at 12:03

## 2021-05-17 RX ADMIN — POTASSIUM CHLORIDE 40 MEQ: 1.5 POWDER, FOR SOLUTION ORAL at 15:39

## 2021-05-17 RX ADMIN — ACETAMINOPHEN 975 MG: 325 TABLET, FILM COATED ORAL at 07:41

## 2021-05-17 RX ADMIN — DOCUSATE SODIUM 50 MG AND SENNOSIDES 8.6 MG 1 TABLET: 8.6; 5 TABLET, FILM COATED ORAL at 19:30

## 2021-05-17 RX ADMIN — POTASSIUM & SODIUM PHOSPHATES POWDER PACK 280-160-250 MG 2 PACKET: 280-160-250 PACK at 18:07

## 2021-05-17 RX ADMIN — ACETAMINOPHEN 975 MG: 325 TABLET, FILM COATED ORAL at 15:39

## 2021-05-17 RX ADMIN — Medication 15 ML: at 07:41

## 2021-05-17 RX ADMIN — POLYETHYLENE GLYCOL 3350 17 G: 17 POWDER, FOR SOLUTION ORAL at 12:03

## 2021-05-17 RX ADMIN — Medication 10 ML: at 03:00

## 2021-05-17 RX ADMIN — Medication 10 ML: at 00:30

## 2021-05-17 RX ADMIN — HYDRALAZINE HYDROCHLORIDE 20 MG: 20 INJECTION INTRAMUSCULAR; INTRAVENOUS at 00:29

## 2021-05-17 ASSESSMENT — ACTIVITIES OF DAILY LIVING (ADL)
ADLS_ACUITY_SCORE: 23

## 2021-05-17 ASSESSMENT — VISUAL ACUITY
OU: OTHER (SEE COMMENT)

## 2021-05-17 NOTE — PROGRESS NOTES
Neurosurgery progress note    S: no acute events overnight    O:  Temp:  [97  F (36.1  C)-98.8  F (37.1  C)] 98.2  F (36.8  C)  Pulse:  [52-94] 74  Resp:  [8-51] 12  BP: (237)/(103) 237/103  MAP:  [74 mmHg-143 mmHg] 105 mmHg  Arterial Line BP: (127-212)/() 177/72  SpO2:  [96 %-98 %] 98 %    Exam:  Incision: c/d/i, drain and EVD in place  Extubated  Garbled speech, says name  FCx4      ASSESSMENT: 70 year old man presented with AMS found to have aSAH with right supraclinoid aneurysm. mFS4 and HH5. GCS 5. S/p EVD placement, DCA,s/p aneurysm clipping on 5/16.     The following conditions are also present, complicating the patient's current management, as described in the Assessment and Plan:   mechanical ventilation on day of admission, intracerebral hematoma, brain compression, acute respiratory failure, coma, based on current GCS of 5 and cerebral aneurysm rupture with hunt sims scale 5 modified lowery 4     Hypokalemia   Severe malnutrition in the context of acute on chronic illness       RECOMMENDATIONS:  - EVD @ 10  - CTA/CTP (completed)  - remove WENDY drain today  - Q1hr neuro exam  - SBP<180  - HOB > 30 degrees  - Continuous cardiac monitoring while in ICU   - Nimodipine  - Glucose < 180  - Continue glucose checks  - Platelets > 100,000  - INR < 1.5  - Hemoglobin > 8  - DVT: SCDs while in bed  - Disposition: 4A  - PT/OT consulted        Samuel Nolasco MD, PhD  Neurosurgery Resident PGY-2    Please contact neurosurgery resident on call with questions.    Dial * * [phone], enter [phone] when prompted.

## 2021-05-17 NOTE — PLAN OF CARE
Major Shift Events: A & O xself or disorientated x4. Moves all extremities, intermittently follows commands. Pupils equal and reactive. Seroquel started for agitation/restlessness given x2. EVD at 10. ICP= 2-10, output= 8-19 of bloody CSF. Minimal drainage from WENDY. Nasogastric tube inserted further, awaiting electrolytes to be stable prior to starting tube feeding. D50 given x1 for blood sugar of 51, see previous note. PRN Hydralazine given x1. SBP goal increased to less than 180. Irregular rhythm and liable BP. Adequate urine output, see doc flow sheets. Passing gas.   Plan: Neuros q1 hour. Goal of normal sleep wake cycle. Start tube feeding.   For vital signs and complete assessments, please see documentation flowsheets.

## 2021-05-17 NOTE — PLAN OF CARE
PT 4A: Cancel- Per discussion with RN, patient minimally following commands and restless. Will hold off on therapy today and initiate when patient better able to participate.

## 2021-05-17 NOTE — PROGRESS NOTES
CLINICAL NUTRITION SERVICES - BRIEF NOTE      Nutrition Prescription     RECOMMENDATIONS FOR MDs/PROVIDERS TO ORDER:  --If post-pyloric FT is indicated, would recommend FT placement with pt under sedation/Radiology placement.   --Water flushes per primary team.  --Do not check gastric residuals with small-bore NGT.      Recommendations already ordered by Registered Dietitian (RD):  --Pending AXR confirmation of feeding tube position (gastric okay)  --GOAL: Pivot 1.5 Cristi @ goal of 60ml/hr (1440ml/day) will provide: 2160 kcals (31 kcal/kg), 135 g PRO (2 g/kg), 1080 ml free H20, 248 g CHO, and 10 g fiber daily.  --Start TF @ 10 ml/hr and advance by 10 ml q 8 hrs until goal rate.  --Do not start or advance TF rate unless K+ >3.0, Mg++ > 1.5,  and Phos > 1.9.  --HOB > 30 degrees     Future/Additional Recommendations:  --TF start/tolerance/lytes via gastric access.    *Please see full assessment note from 5/16/2021    New Findings:  Received call from RN earlier regarding possible reposition of FT to post-pyloric. Noted, TF orders placed 5/16 but TF not started. Pt hypoglycemic this am. Spoke with Neuro Critical Care team, recommending reposition, as FT is pointing toward GE junction. Okay to start TF if access is still gastric after reposition.     *See procedure note.     Interventions  Collaboration with other providers - Neuro Critical Care, RN  Enteral Nutrition - as above    RD to follow per protocol.    Penelope Garcia, MS, RD, LD, [facility]  Neuro ICU  ASCOM [id number]  Pager: [phone]

## 2021-05-17 NOTE — PROCEDURES
Small Bowel Feeding Tube Reposition  Reason for Feeding Tube Reposition: gastric FT pointing towards GE junction, repositioning (gastric access still okay if unable to get post-pyloric per Neuro Critical Care)  Cortrak Start Time: 13:45  Cortrak End Time: 14:00  Medicine Delivered During Procedure: none  Reposition Successful: presumed gastric per Cortrak (AXR pending)  Procedure Complications: pt very agitated with any contact, including retying or unclipping the bridle; pt not following commands; required RN to assist with holding pt  Final Placement Vladimir at exit of nare: 95 cm + bridle @ 96 cm  Face to Face time with patient: 25 minutes total    Penelope Garcia, MS, RD, LD, [facility]  Neuro ICU  ASCOM [id number]  Pager: [phone]

## 2021-05-17 NOTE — PLAN OF CARE
Major Shift Events:    Patient stable this shift. Notably restless but not agitated. Patient wiggling around in bed all night as though he couldn't get comfortable. Attempted to reposition him, removed SCDs briefly to reduce stimuli to see if that helped, removed restraints (kept mitts) for a while to see if that helped, but nothing helped. Oxycodone given x2 for possible pain induced restlessness, but also was not effective.     Patient was repositioning himself so frequently and sitting up in bed that he was causing dumps of CSF in his EVD, so HOB remained flat at 0 degrees for most of shift for patient safety. Neuro crit resident aware of this.     Heart rhythm abnormal throughout shift both in rhythm and rate. Patient's electrolytes were off (K 2.8 - replaced, now 3.2 & Phos 1.7). EKG obtained, electrolytes replaced, neuro crit resident notified. Replacing electrolytes did not seem to help thus far. Patient's heart rate and rhythm extremely labile (50s-90s) along with blood pressure being symptomatically labile as well (130s-190s).  Hydralazine given x1 for HTN, but otherwise PRNs not given due to labile heart rate and not meeting parameters ie. Labetalol needing to be above 60.   EVD at 10 above. ICP 3-10 and output 10-19.     Plan: Continue with plan of care. Consider K phos replacement addition today to help with low electrolytes. Reposition NJ today (dietitics) and begin tube feedings.    For vital signs and complete assessments, please see documentation flowsheets.

## 2021-05-17 NOTE — PROGRESS NOTES
"Neuroscience Intensive Care Progress Note    Problem List  1.  Aneurysmal subarachnoid hemorrhage, HH 5, MF 4, bleed day 5/15  2.  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  Hydrocephalus status post EVD 5/15  4.  Hypertension  5.  Acute hypoxic respiratory failure  6.  Hypernatremia  7.  Hyperchloremia  8.  Leukocytosis  9.  Normocytic anemia    24 hour events:  - No events overnight  - Remains on RA        Intake/Output Summary (Last 24 hours) at 5/17/2021 1445  Last data filed at 5/17/2021 1400  Gross per 24 hour   Intake 2946.67 ml   Output 4113 ml   Net -1166.33 ml           Arterial Line BP: (115-180)/(57-89) 180/88  MAP:  [75 mmHg-121 mmHg] 121 mmHg    Current Medications:    acetaminophen  975 mg Oral or Feeding Tube Q8H MARAH     multivitamins w/minerals  15 mL Per Feeding Tube Daily     niMODipine  60 mg Oral or Feeding Tube Q4H MARAH    And     sodium chloride 0.9 %  10 mL Oral or Feeding Tube Q4H MARAH     sodium phosphate  15 mmol Intravenous Once       PRN Medications:  hydrALAZINE, labetalol, naloxone **OR** naloxone **OR** naloxone **OR** naloxone, ondansetron **OR** ondansetron, oxyCODONE, prochlorperazine **OR** prochlorperazine **OR** prochlorperazine, QUEtiapine    Infusions:    niCARdipine Stopped (05/15/21 1330)     Plasma-Lyte A 100 mL/hr (05/17/21 0700)       No Known Allergies    Physical Examination:  BP (!) 158/71   Pulse 60   Temp 97.7  F (36.5  C)   Resp 22   Ht 1.803 m (5' 11\")   Wt 69 kg (152 lb 1.9 oz)   SpO2 98%   BMI 21.22 kg/m      EXAM:  Examined off sedation. Mr Neri is examined in the supine position while intubated.  He opens his eyes to gentle touch, but does not follow commands consistently for me. Does not track. Pupils are symmetric and react to light.  No facial asymmetry.  His arms moves purporsefully when agitated with purpose with antigravity strength.  Sensation appears grossly intact by grimace to noxious stimulus.  On Room air. Lung sounds " clear.  Regular cardiac rhythm.  Soft abdomen with bowel sounds.  Groin site clean without evidence of hematoma.  No peripheral edema.    Labs/Studies:  Recent Labs   Lab Test 05/16/21  0405 05/15/21  2125 05/15/21  1526 05/15/21  1311 05/15/21  1222   * 146* 144 139 131*   POTASSIUM 3.5 3.8 3.7 3.2* 4.1   CHLORIDE 115* 113*  --  109 103   CO2 27 27  --  24 22   ANIONGAP 6 5  --  6 6   * 142* 125* 156* 215*   BUN 16 16  --  14 15   CR 0.88 0.99  --  0.78 0.78   RAGINI 8.9 8.8  --  7.4* 8.3*   WBC 15.0* 12.3*  --   --  16.5*   RBC 3.74* 3.85*  --   --  4.43   HGB 11.7* 11.8* 13.1*  --  13.6    239  --   --  257       Recent Labs   Lab Test 05/15/21  2125 05/15/21  1222 05/15/21  0930   INR 1.20* 1.16* 1.07   PTT 30 29 27       Recent Labs   Lab 05/16/21  1223 05/15/21  1526 05/15/21  1222   PH 7.45 7.46* 7.39   PCO2 30* 33* 38   PO2 137* 145* 269*   HCO3 21 23 23   O2PER 30 40.0 60       Imaging and labs reviewed personally in the EMR    Assessment:  Mr Neri is a 70-year-old gentleman currently admitted for management of aneurysmal subarachnoid hemorrhage.  He is now post bleed day 2, postop day 2 for right P-comm aneurysm clipping. Continues to do well today. He is doing well today, following commands and was successfully extubated.  Plan for close monitoring for neurologic changes associated with vasospasm and/or worsening hydrocephalus.      Plan:  Neuro:   #Aneurysmal subarachnoid hemorrhage, HH 5, MF 4, bleed day 5/15  #Ruptured right P-comm aneurysm status post clipping 5/15  #IVH with subsequent hydrocephalus status post LF EVD 5/15    - LF EVD @ 10; ICPs 3-9. 175, 30  -Hourly neurochecks  -Head of bed elevated  -Nimodipine  -Stop Keppra, aneurysm secured  -Normothermia  -Euglycemia  -Normonatremia; allowing hypernatremia (149 today)  -Daily TCD; yesterday TCDs wnl; today WNL  -Baseline CTA/CTP  -SBP less than 180 (order modified)    #agitation  PRN seroquel; last QTc 365  (5/16)    #delirium precautions    #pain management  - scheduled acetaminophen  - oxycodone 5mgq4 prn     Resp:   #Acute hypoxic respiratory failure  -Extubated yesterday;   -Supplemental oxygen available as needed  -Pulse oximetry    CV:   #Hypertension, relative; PTA lisinopril  # SBP 140s-180s; HR 60s-90s  #overnight required 60mg hydralazine  -- Echo with normal EF  -SBP less than 180  -Nicardipine drip if needed  -Labetalol and hydralazine IV if needed    Renal:   #Hypernatremia  #Hyperchloremia  -Goal normonatremia  -Will permit some hypernatremia  -Monitor BMP  -Plasma-Lyte @ 100/hr to 50/hr  --Will reposition NGT    Endo: No active issues.  Monitor glucose.  Hypoglycemia protocol. One episode of glucose 51; dextrose given.    Heme:   #Leukocytosis, suspect reactive, no focal sign of infection;   # Given persistent increase; low threshold to panculture if becomes febrile  #Normocytic anemia  -Monitor CBC and vital signs  --Tomorrow send CBC with diff; low threshold for panculturing    GI:   #nutrition  -May need to adjust position of feeding tube  -Okay to use feeding tube for medications  - Begin TF today  -Nutrition services consulted  -Free water flush  --begin bowel regimen      ID: No active issues.  Monitor leukocytosis and vital signs.  Monitor CBC    PPX:    DVT: SCDs, chemoprophylaxis contraindicated in setting of acute intracranial hemorrhage; will hold for today    GI: Not indicated    Lines and Tubes:  -Right radial arterial line 5/15  -Left frontal EVD 5/15  -Left subclavian triple-lumen catheter 5/15  -Indwelling urinary catheter 5/15  -Nasogastric tube 5/16; repositioned on 5/17    Code Status: Full code    Dispo: 4A ICU. He will require ICU care for at least 1 to 2 weeks.    Patient seen and discussed with     David Sands MD  Neurosurgery Resident PGY-1    05/17/2021

## 2021-05-18 ENCOUNTER — APPOINTMENT (OUTPATIENT)
Dept: CT IMAGING | Facility: CLINIC | Age: 71
DRG: 003 | End: 2021-05-18
Attending: STUDENT IN AN ORGANIZED HEALTH CARE EDUCATION/TRAINING PROGRAM
Payer: COMMERCIAL

## 2021-05-18 ENCOUNTER — APPOINTMENT (OUTPATIENT)
Dept: PHYSICAL THERAPY | Facility: CLINIC | Age: 71
DRG: 003 | End: 2021-05-18
Attending: STUDENT IN AN ORGANIZED HEALTH CARE EDUCATION/TRAINING PROGRAM
Payer: COMMERCIAL

## 2021-05-18 ENCOUNTER — APPOINTMENT (OUTPATIENT)
Dept: ULTRASOUND IMAGING | Facility: CLINIC | Age: 71
DRG: 003 | End: 2021-05-18
Attending: STUDENT IN AN ORGANIZED HEALTH CARE EDUCATION/TRAINING PROGRAM
Payer: COMMERCIAL

## 2021-05-18 LAB
ABO + RH BLD: NORMAL
ABO + RH BLD: NORMAL
ANION GAP SERPL CALCULATED.3IONS-SCNC: 3 MMOL/L (ref 3–14)
ANION GAP SERPL CALCULATED.3IONS-SCNC: 5 MMOL/L (ref 3–14)
BLD GP AB SCN SERPL QL: NORMAL
BLOOD BANK CMNT PATIENT-IMP: NORMAL
BUN SERPL-MCNC: 14 MG/DL (ref 7–30)
BUN SERPL-MCNC: 18 MG/DL (ref 7–30)
CALCIUM SERPL-MCNC: 9.2 MG/DL (ref 8.5–10.1)
CALCIUM SERPL-MCNC: 9.4 MG/DL (ref 8.5–10.1)
CHLORIDE SERPL-SCNC: 118 MMOL/L (ref 94–109)
CHLORIDE SERPL-SCNC: 120 MMOL/L (ref 94–109)
CO2 SERPL-SCNC: 26 MMOL/L (ref 20–32)
CO2 SERPL-SCNC: 26 MMOL/L (ref 20–32)
CREAT SERPL-MCNC: 0.68 MG/DL (ref 0.66–1.25)
CREAT SERPL-MCNC: 0.7 MG/DL (ref 0.66–1.25)
ERYTHROCYTE [DISTWIDTH] IN BLOOD BY AUTOMATED COUNT: 13.1 % (ref 10–15)
GFR SERPL CREATININE-BSD FRML MDRD: >90 ML/MIN/{1.73_M2}
GFR SERPL CREATININE-BSD FRML MDRD: >90 ML/MIN/{1.73_M2}
GLUCOSE BLDC GLUCOMTR-MCNC: 118 MG/DL (ref 70–99)
GLUCOSE BLDC GLUCOMTR-MCNC: 126 MG/DL (ref 70–99)
GLUCOSE BLDC GLUCOMTR-MCNC: 138 MG/DL (ref 70–99)
GLUCOSE BLDC GLUCOMTR-MCNC: 153 MG/DL (ref 70–99)
GLUCOSE SERPL-MCNC: 118 MG/DL (ref 70–99)
GLUCOSE SERPL-MCNC: 124 MG/DL (ref 70–99)
HCT VFR BLD AUTO: 35.5 % (ref 40–53)
HGB BLD-MCNC: 11.7 G/DL (ref 13.3–17.7)
MAGNESIUM SERPL-MCNC: 2.4 MG/DL (ref 1.6–2.3)
MCH RBC QN AUTO: 30.4 PG (ref 26.5–33)
MCHC RBC AUTO-ENTMCNC: 33 G/DL (ref 31.5–36.5)
MCV RBC AUTO: 92 FL (ref 78–100)
OSMOLALITY SERPL: 321 MMOL/KG (ref 280–301)
OSMOLALITY UR: 497 MMOL/KG (ref 100–1200)
PHOSPHATE SERPL-MCNC: 3.2 MG/DL (ref 2.5–4.5)
PLATELET # BLD AUTO: 243 10E9/L (ref 150–450)
POTASSIUM SERPL-SCNC: 3 MMOL/L (ref 3.4–5.3)
POTASSIUM SERPL-SCNC: 3.1 MMOL/L (ref 3.4–5.3)
POTASSIUM SERPL-SCNC: 3.1 MMOL/L (ref 3.4–5.3)
POTASSIUM SERPL-SCNC: 3.2 MMOL/L (ref 3.4–5.3)
RBC # BLD AUTO: 3.85 10E12/L (ref 4.4–5.9)
SODIUM SERPL-SCNC: 149 MMOL/L (ref 133–144)
SODIUM SERPL-SCNC: 149 MMOL/L (ref 133–144)
SODIUM SERPL-SCNC: 150 MMOL/L (ref 133–144)
SODIUM UR-SCNC: 90 MMOL/L
SP GR UR STRIP: 1.02 (ref 1–1.03)
SPECIMEN EXP DATE BLD: NORMAL
WBC # BLD AUTO: 16.9 10E9/L (ref 4–11)

## 2021-05-18 PROCEDURE — 86900 BLOOD TYPING SEROLOGIC ABO: CPT | Performed by: STUDENT IN AN ORGANIZED HEALTH CARE EDUCATION/TRAINING PROGRAM

## 2021-05-18 PROCEDURE — 83735 ASSAY OF MAGNESIUM: CPT | Performed by: NEUROLOGICAL SURGERY

## 2021-05-18 PROCEDURE — 86850 RBC ANTIBODY SCREEN: CPT | Performed by: STUDENT IN AN ORGANIZED HEALTH CARE EDUCATION/TRAINING PROGRAM

## 2021-05-18 PROCEDURE — 84300 ASSAY OF URINE SODIUM: CPT | Performed by: NURSE PRACTITIONER

## 2021-05-18 PROCEDURE — 83935 ASSAY OF URINE OSMOLALITY: CPT | Performed by: NURSE PRACTITIONER

## 2021-05-18 PROCEDURE — 80048 BASIC METABOLIC PNL TOTAL CA: CPT | Performed by: NURSE PRACTITIONER

## 2021-05-18 PROCEDURE — 97530 THERAPEUTIC ACTIVITIES: CPT | Mod: GP

## 2021-05-18 PROCEDURE — 97162 PT EVAL MOD COMPLEX 30 MIN: CPT | Mod: GP

## 2021-05-18 PROCEDURE — 250N000009 HC RX 250: Performed by: NURSE PRACTITIONER

## 2021-05-18 PROCEDURE — 250N000013 HC RX MED GY IP 250 OP 250 PS 637: Performed by: STUDENT IN AN ORGANIZED HEALTH CARE EDUCATION/TRAINING PROGRAM

## 2021-05-18 PROCEDURE — 99291 CRITICAL CARE FIRST HOUR: CPT | Mod: GC | Performed by: PSYCHIATRY & NEUROLOGY

## 2021-05-18 PROCEDURE — 70450 CT HEAD/BRAIN W/O DYE: CPT | Mod: 26 | Performed by: STUDENT IN AN ORGANIZED HEALTH CARE EDUCATION/TRAINING PROGRAM

## 2021-05-18 PROCEDURE — 84132 ASSAY OF SERUM POTASSIUM: CPT | Performed by: NEUROLOGICAL SURGERY

## 2021-05-18 PROCEDURE — 80048 BASIC METABOLIC PNL TOTAL CA: CPT | Performed by: NEUROLOGICAL SURGERY

## 2021-05-18 PROCEDURE — 81003 URINALYSIS AUTO W/O SCOPE: CPT | Performed by: NURSE PRACTITIONER

## 2021-05-18 PROCEDURE — 93886 INTRACRANIAL COMPLETE STUDY: CPT

## 2021-05-18 PROCEDURE — 70450 CT HEAD/BRAIN W/O DYE: CPT

## 2021-05-18 PROCEDURE — 250N000013 HC RX MED GY IP 250 OP 250 PS 637: Performed by: PSYCHIATRY & NEUROLOGY

## 2021-05-18 PROCEDURE — 250N000009 HC RX 250: Performed by: STUDENT IN AN ORGANIZED HEALTH CARE EDUCATION/TRAINING PROGRAM

## 2021-05-18 PROCEDURE — 83930 ASSAY OF BLOOD OSMOLALITY: CPT | Performed by: NURSE PRACTITIONER

## 2021-05-18 PROCEDURE — 999N001017 HC STATISTIC GLUCOSE BY METER IP

## 2021-05-18 PROCEDURE — 97110 THERAPEUTIC EXERCISES: CPT | Mod: GP

## 2021-05-18 PROCEDURE — 258N000003 HC RX IP 258 OP 636: Performed by: NURSE PRACTITIONER

## 2021-05-18 PROCEDURE — 250N000011 HC RX IP 250 OP 636: Performed by: NEUROLOGICAL SURGERY

## 2021-05-18 PROCEDURE — 250N000013 HC RX MED GY IP 250 OP 250 PS 637: Performed by: NEUROLOGICAL SURGERY

## 2021-05-18 PROCEDURE — 200N000002 HC R&B ICU UMMC

## 2021-05-18 PROCEDURE — 84100 ASSAY OF PHOSPHORUS: CPT | Performed by: NEUROLOGICAL SURGERY

## 2021-05-18 PROCEDURE — 250N000011 HC RX IP 250 OP 636: Performed by: STUDENT IN AN ORGANIZED HEALTH CARE EDUCATION/TRAINING PROGRAM

## 2021-05-18 PROCEDURE — 84295 ASSAY OF SERUM SODIUM: CPT | Performed by: STUDENT IN AN ORGANIZED HEALTH CARE EDUCATION/TRAINING PROGRAM

## 2021-05-18 PROCEDURE — 258N000003 HC RX IP 258 OP 636: Performed by: STUDENT IN AN ORGANIZED HEALTH CARE EDUCATION/TRAINING PROGRAM

## 2021-05-18 PROCEDURE — 85027 COMPLETE CBC AUTOMATED: CPT | Performed by: NEUROLOGICAL SURGERY

## 2021-05-18 PROCEDURE — 93886 INTRACRANIAL COMPLETE STUDY: CPT | Mod: 26 | Performed by: RADIOLOGY

## 2021-05-18 PROCEDURE — 86901 BLOOD TYPING SEROLOGIC RH(D): CPT | Performed by: STUDENT IN AN ORGANIZED HEALTH CARE EDUCATION/TRAINING PROGRAM

## 2021-05-18 RX ORDER — HYDRALAZINE HYDROCHLORIDE 20 MG/ML
10-20 INJECTION INTRAMUSCULAR; INTRAVENOUS
Status: DISCONTINUED | OUTPATIENT
Start: 2021-05-18 | End: 2021-06-11 | Stop reason: HOSPADM

## 2021-05-18 RX ORDER — LABETALOL HYDROCHLORIDE 5 MG/ML
10-20 INJECTION, SOLUTION INTRAVENOUS EVERY 10 MIN PRN
Status: DISCONTINUED | OUTPATIENT
Start: 2021-05-18 | End: 2021-05-18

## 2021-05-18 RX ORDER — POTASSIUM CHLORIDE 29.8 MG/ML
20 INJECTION INTRAVENOUS
Status: COMPLETED | OUTPATIENT
Start: 2021-05-18 | End: 2021-05-18

## 2021-05-18 RX ORDER — HYDRALAZINE HYDROCHLORIDE 20 MG/ML
10-20 INJECTION INTRAMUSCULAR; INTRAVENOUS
Status: DISCONTINUED | OUTPATIENT
Start: 2021-05-18 | End: 2021-05-18

## 2021-05-18 RX ORDER — POTASSIUM CHLORIDE 1.5 G/1.58G
40 POWDER, FOR SOLUTION ORAL ONCE
Status: COMPLETED | OUTPATIENT
Start: 2021-05-18 | End: 2021-05-18

## 2021-05-18 RX ORDER — LABETALOL HYDROCHLORIDE 5 MG/ML
10-20 INJECTION, SOLUTION INTRAVENOUS EVERY 10 MIN PRN
Status: DISCONTINUED | OUTPATIENT
Start: 2021-05-18 | End: 2021-06-11 | Stop reason: HOSPADM

## 2021-05-18 RX ORDER — SODIUM CHLORIDE, SODIUM LACTATE, POTASSIUM CHLORIDE, CALCIUM CHLORIDE 600; 310; 30; 20 MG/100ML; MG/100ML; MG/100ML; MG/100ML
INJECTION, SOLUTION INTRAVENOUS CONTINUOUS
Status: DISCONTINUED | OUTPATIENT
Start: 2021-05-18 | End: 2021-05-20

## 2021-05-18 RX ADMIN — Medication 15 ML: at 08:17

## 2021-05-18 RX ADMIN — NICARDIPINE HYDROCHLORIDE 5 MG/HR: 0.2 INJECTION, SOLUTION INTRAVENOUS at 23:43

## 2021-05-18 RX ADMIN — DOCUSATE SODIUM 50 MG AND SENNOSIDES 8.6 MG 1 TABLET: 8.6; 5 TABLET, FILM COATED ORAL at 08:18

## 2021-05-18 RX ADMIN — QUETIAPINE FUMARATE 25 MG: 25 TABLET ORAL at 18:41

## 2021-05-18 RX ADMIN — NIMODIPINE 60 MG: 30 SOLUTION ORAL at 03:32

## 2021-05-18 RX ADMIN — SODIUM CHLORIDE, POTASSIUM CHLORIDE, SODIUM LACTATE AND CALCIUM CHLORIDE: 600; 310; 30; 20 INJECTION, SOLUTION INTRAVENOUS at 21:39

## 2021-05-18 RX ADMIN — OXYCODONE HYDROCHLORIDE 5 MG: 5 TABLET ORAL at 15:30

## 2021-05-18 RX ADMIN — NIMODIPINE 60 MG: 30 SOLUTION ORAL at 15:20

## 2021-05-18 RX ADMIN — Medication 10 ML: at 15:20

## 2021-05-18 RX ADMIN — ACETAMINOPHEN 975 MG: 325 TABLET, FILM COATED ORAL at 08:17

## 2021-05-18 RX ADMIN — ACETAMINOPHEN 975 MG: 325 TABLET, FILM COATED ORAL at 23:43

## 2021-05-18 RX ADMIN — Medication 10 ML: at 23:43

## 2021-05-18 RX ADMIN — Medication 10 ML: at 12:42

## 2021-05-18 RX ADMIN — POTASSIUM CHLORIDE 20 MEQ: 29.8 INJECTION, SOLUTION INTRAVENOUS at 14:48

## 2021-05-18 RX ADMIN — POTASSIUM CHLORIDE 20 MEQ: 29.8 INJECTION, SOLUTION INTRAVENOUS at 12:42

## 2021-05-18 RX ADMIN — DOCUSATE SODIUM 50 MG AND SENNOSIDES 8.6 MG 1 TABLET: 8.6; 5 TABLET, FILM COATED ORAL at 20:24

## 2021-05-18 RX ADMIN — SODIUM CHLORIDE, POTASSIUM CHLORIDE, SODIUM LACTATE AND CALCIUM CHLORIDE 1000 ML: 600; 310; 30; 20 INJECTION, SOLUTION INTRAVENOUS at 14:50

## 2021-05-18 RX ADMIN — POTASSIUM CHLORIDE 40 MEQ: 1.5 POWDER, FOR SOLUTION ORAL at 05:14

## 2021-05-18 RX ADMIN — NICARDIPINE HYDROCHLORIDE 5 MG/HR: 0.2 INJECTION, SOLUTION INTRAVENOUS at 15:20

## 2021-05-18 RX ADMIN — NIMODIPINE 60 MG: 30 SOLUTION ORAL at 08:26

## 2021-05-18 RX ADMIN — ACETAMINOPHEN 975 MG: 325 TABLET, FILM COATED ORAL at 15:17

## 2021-05-18 RX ADMIN — NICARDIPINE HYDROCHLORIDE 7.5 MG/HR: 0.2 INJECTION, SOLUTION INTRAVENOUS at 18:41

## 2021-05-18 RX ADMIN — Medication 10 ML: at 03:31

## 2021-05-18 RX ADMIN — Medication 10 ML: at 08:26

## 2021-05-18 RX ADMIN — POLYETHYLENE GLYCOL 3350 17 G: 17 POWDER, FOR SOLUTION ORAL at 08:18

## 2021-05-18 RX ADMIN — NIMODIPINE 60 MG: 30 SOLUTION ORAL at 20:24

## 2021-05-18 RX ADMIN — POTASSIUM CHLORIDE 20 MEQ: 29.8 INJECTION, SOLUTION INTRAVENOUS at 15:52

## 2021-05-18 RX ADMIN — Medication 10 ML: at 20:25

## 2021-05-18 RX ADMIN — NIMODIPINE 60 MG: 30 SOLUTION ORAL at 12:42

## 2021-05-18 RX ADMIN — HYDRALAZINE HYDROCHLORIDE 20 MG: 20 INJECTION INTRAMUSCULAR; INTRAVENOUS at 03:31

## 2021-05-18 RX ADMIN — POTASSIUM CHLORIDE 40 MEQ: 1.5 POWDER, FOR SOLUTION ORAL at 20:24

## 2021-05-18 RX ADMIN — NIMODIPINE 60 MG: 30 SOLUTION ORAL at 23:43

## 2021-05-18 RX ADMIN — HYDRALAZINE HYDROCHLORIDE 20 MG: 20 INJECTION INTRAMUSCULAR; INTRAVENOUS at 07:47

## 2021-05-18 ASSESSMENT — VISUAL ACUITY
OU: OTHER (SEE COMMENT)

## 2021-05-18 ASSESSMENT — ACTIVITIES OF DAILY LIVING (ADL)
ADLS_ACUITY_SCORE: 23
ADLS_ACUITY_SCORE: 23
ADLS_ACUITY_SCORE: 22
ADLS_ACUITY_SCORE: 23

## 2021-05-18 ASSESSMENT — MIFFLIN-ST. JEOR: SCORE: 1502.13

## 2021-05-18 NOTE — PHARMACY-ADMISSION MEDICATION HISTORY
Admission Medication History Completed by Pharmacy    See [facility] Admission Navigator for allergy information, preferred outpatient pharmacy, prior to admission medications and immunization status.     Medication History Sources:     Patient's wife, [employer] (no fills seen)    Changes made to PTA medication list (reason):    Added: None    Deleted: vitamin D - per wife, has not taken in a few weeks; lisinopril - per wife, patient on this years ago but not currently taking and has not had primary care follow-up; trazodone - not taking per wife    Changed: None    Additional Information:    No current prescription medications, OTC medication or supplements.    Prior to Admission medications    Not on File       Date completed: 05/18/21    Medication history completed by: Penelope Acosta RP

## 2021-05-18 NOTE — PROGRESS NOTES
Surgical WENDY drain deemed unnecessary and removed at bedside.  Suction discontinued prior to removal. Catheter tip intact upon removal.  One 3.0 monocryl suture placed at insertion site upon removal in sterile fashion.  The patient tolerated procedure well.  Will continue to monitor patient closely.    Head CT will be obtained to ensure no acute blood is noted.     Kylee Jackson CNP  Department of Neurosurgery  Pager: [phone]

## 2021-05-18 NOTE — PLAN OF CARE
Major Shift Events:  Oriented to self only, difficult to get pt to answer questions. Pupils equal and brisk, 2mm. Wiggles right toes otherwise not following commands however does move all extremities spontaneously. EVD @ 10 above, ICP's 6-12 , output 7-18. BP very labile, SBP ranging from 130's-200's. Hydralazine given x 2 with no change. Neuro surg aware. Sinus arrhythmia, occasional PVC's, HR 50's-90's. Afebrile. Room air. TF started at 0530 @ 10, 60cc FWF Q4H. No BM. Gutierrez Dc'd at 0530, external catheter placed. Plasmalyte @ 50/hr.     Plan: Continue with plan of care. Contact neuro surg with any concerns.   For vital signs and complete assessments, please see documentation flowsheets.

## 2021-05-18 NOTE — PROGRESS NOTES
"Neuroscience Intensive Care Progress Note    Problem List  1.  Aneurysmal subarachnoid hemorrhage, HH 5, MF 4, bleed day 5/15  2.  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  Hydrocephalus status post EVD 5/15  4.  Hypertension  5.  Acute hypoxic respiratory failure  6.  Hypernatremia  7.  Hyperchloremia  8.  Leukocytosis  9.  Normocytic anemia    24 hour events:  - No events overnight  - Remains on RA    Intake/Output Summary (Last 24 hours) at 5/18/2021 1458  Last data filed at 5/18/2021 1400  Gross per 24 hour   Intake 1770 ml   Output 2706 ml   Net -936 ml       Arterial Line BP: (115-212)/() 174/69  MAP:  [71 mmHg-143 mmHg] 74 mmHg    Current Medications:    acetaminophen  975 mg Oral or Feeding Tube Q8H MARAH     multivitamins w/minerals  15 mL Per Feeding Tube Daily     niMODipine  60 mg Oral or Feeding Tube Q4H MARAH    And     sodium chloride 0.9 %  10 mL Oral or Feeding Tube Q4H MARAH     polyethylene glycol  17 g Oral or Feeding Tube Daily     senna-docusate  1 tablet Oral or Feeding Tube BID    Or     senna-docusate  2 tablet Oral or Feeding Tube BID       PRN Medications:  bisacodyl, hydrALAZINE, labetalol, naloxone **OR** naloxone **OR** naloxone **OR** naloxone, ondansetron **OR** ondansetron, oxyCODONE, prochlorperazine **OR** prochlorperazine **OR** prochlorperazine, QUEtiapine    Infusions:    niCARdipine Stopped (05/15/21 1330)     Plasma-Lyte A 50 mL/hr (05/18/21 0700)       No Known Allergies    Physical Examination:  BP (!) 237/103   Pulse 82   Temp 98.2  F (36.8  C)   Resp 18   Ht 1.803 m (5' 11\")   Wt 71 kg (156 lb 8.4 oz)   SpO2 98%   BMI 21.83 kg/m      EXAM:  Examined off sedation. Mr Neri is examined in the supine position. Extubated. He opens his eyes to gentle touch, does follow commands consistently for me. Wiggles toes appropriately. Sounds like he's saying \"yes\", when asked if he had a good night. Tracks but not briskly. Pupils are symmetric and react to " light.  No facial asymmetry.  His arms moves purporsefully when agitated with purpose with antigravity strength.  Sensation appears grossly intact by grimace to noxious stimulus.  On Room air. Lung sounds clear.  Regular cardiac rhythm.  Soft abdomen with bowel sounds.  Groin site clean without evidence of hematoma.  No peripheral edema.    Labs/Studies:  Recent Labs   Lab Test 05/16/21  0405 05/15/21  2125 05/15/21  1526 05/15/21  1311 05/15/21  1222   * 146* 144 139 131*   POTASSIUM 3.5 3.8 3.7 3.2* 4.1   CHLORIDE 115* 113*  --  109 103   CO2 27 27  --  24 22   ANIONGAP 6 5  --  6 6   * 142* 125* 156* 215*   BUN 16 16  --  14 15   CR 0.88 0.99  --  0.78 0.78   RAGINI 8.9 8.8  --  7.4* 8.3*   WBC 15.0* 12.3*  --   --  16.5*   RBC 3.74* 3.85*  --   --  4.43   HGB 11.7* 11.8* 13.1*  --  13.6    239  --   --  257       Recent Labs   Lab Test 05/15/21  2125 05/15/21  1222 05/15/21  0930   INR 1.20* 1.16* 1.07   PTT 30 29 27       Recent Labs   Lab 05/16/21  1223 05/15/21  1526 05/15/21  1222   PH 7.45 7.46* 7.39   PCO2 30* 33* 38   PO2 137* 145* 269*   HCO3 21 23 23   O2PER 30 40.0 60       Imaging and labs reviewed personally in the EMR    Assessment:  Mr Neri is a 70-year-old gentleman currently admitted for management of aneurysmal subarachnoid hemorrhage.  He is now post bleed day 3, postop day 3 for right P-comm aneurysm clipping. Continues to do well today. He is doing well today, following commands and was successfully extubated.  Plan for close monitoring for neurologic changes associated with vasospasm and/or worsening hydrocephalus.      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 301 (175 yesterday), 83 since midnight  -Hourly neurochecks  -Head of bed elevated  -Nimodipine  -- Keppra stopped; aneurysm secured  -Normothermia  -Euglycemia  -Normonatremia; allowing hypernatremia (Repeat Na 149 today)  -Daily TCD; yesterday TCDs wnl; today WNL w/increased R BAYLEE to 93.   -Baseline CTA/CTP done  -SBP less than 180     #agitation  PRN seroquel; last QTc 365 (5/16)    #delirium precautions    #pain management  - scheduled acetaminophen 975q3  - oxycodone 5mgq4 prn; required 15mg yesterday    Resp:   #Acute hypoxic respiratory failure  - Continues on RA at 98%  -Supplemental oxygen available as needed  -Pulse oximetry    CV:   #Hypertension, relative; PTA lisinopril  # SBP 160s-190s; HR 57-89  #overnight required 50mg hydralazine  -- Echo with normal EF  -SBP less than 180  -Nicardipine drip if needed  -Labetalol and hydralazine IV if needed    Renal:   #Hypernatremia  #Hyperchloremia  -Goal normonatremia  -Monitor BMP  -Plasma-Lyte 50/hr  -- FWF at 100q4   -- Gutierrez replaced; will watch UOP    Endo: No active issues.  Monitor glucose.  Hypoglycemia protocol.     Heme:   #Leukocytosis, suspect reactive, no focal sign of infection;   # Given persistent increase; low threshold to panculture if becomes febrile  #Normocytic anemia  -Monitor CBC and vital signs    GI:   #nutrition  -May need to adjust position of feeding tube  -Okay to use feeding tube for medications  - TF @ 10; advancing to goal  -Nutrition services consulted  -Free water flush (100q4)  --Adding bowel regimen    ID:   - Remains afebrile; WBC to 16.9 today from 20.   No active issues.  Monitor leukocytosis and vital signs.  Monitor CBC    PPX:    DVT: SCDs, chemoprophylaxis contraindicated in setting of acute intracranial hemorrhage; will hold for today    GI: Not  indicated    Lines and Tubes:  -Right radial arterial line 5/15  -Left frontal EVD 5/15  -Left subclavian triple-lumen catheter 5/15  -Indwelling urinary catheter 5/18  -Nasogastric tube 5/16; repositioned on 5/17    Code Status: Full code    Dispo: 4A ICU. He will require ICU care for at least 1 to 2 weeks.    Patient seen and discussed with     David Sands MD  Neuro ICU Resident PGY-1    05/18/2021

## 2021-05-18 NOTE — PROGRESS NOTES
05/18/21 0930   Quick Adds   Type of Visit Initial PT Evaluation   Living Environment   People in home spouse   Current Living Arrangements house   Home Accessibility stairs within home   Number of Stairs, Within Home, Primary   (6 + 6, split level)   Transportation Anticipated car, drives self;family or friend will provide   Living Environment Comments Patient lives in split level home with wife. No stairs to enter home when entering from garage, then 6 stairs up to living room, additional 6 steps to upper level where bedroom, bathroom, kitchen located. One adult daughter and son-in-law live locally.   Self-Care   Usual Activity Tolerance good   Current Activity Tolerance poor   Regular Exercise Yes   Activity/Exercise Type walking   Exercise Amount/Frequency daily   Equipment Currently Used at Home none   Activity/Exercise/Self-Care Comment Patient is independent with all mobility and self cares at baseline, wife reports patient walks 2-3 miles a day. Usually enjoys bike riding but hasn't been riding since November. Both patient and wife are retired.   Disability/Function   Concentrating, Remembering or Making Decisions Difficulty no   Difficulty Communicating no   Difficulty Eating/Swallowing no   Walking or Climbing Stairs Difficulty no   Dressing/Bathing Difficulty no   Toileting issues no   Doing Errands Independently Difficulty (such as shopping) no   Fall history within last six months no   Change in Functional Status Since Onset of Current Illness/Injury yes   General Information   Onset of Illness/Injury or Date of Surgery 05/15/21   Referring Physician Adrian Paredes MD   Pertinent History of Current Problem (include personal factors and/or comorbidities that impact the POC) 70 year old man presented with AMS found to have aSAH with right supraclinoid aneurysm. mFS4 and HH5. GCS 5. S/p EVD placement, DCA,s/p aneurysm clipping on 5/16.   Existing Precautions/Restrictions fall  (craniotomy, EVD)    General Observations Activity: up with assist   Cognition   Orientation Status (Cognition) disoriented to;time;place;situation  (able state he is in hospital)   Affect/Mental Status (Cognition) low arousal/lethargic;confused   Follows Commands (Cognition) follows one-step commands;75-90% accuracy;delayed response/completion;physical/tactile prompts required;repetition of directions required   Safety Deficit (Cognition) at risk behavior observed;impulsivity;insight into deficits/self-awareness   Pain Assessment   Patient Currently in Pain No   Posture    Posture Protracted shoulders   Range of Motion (ROM)   ROM Comment BLE WFL   Strength   Strength Comments not formally tested, generalized deconditioning, demonstrates at least 3+/5 BLE strength with mobility   Bed Mobility   Comment (Bed Mobility) bilateral supine rolling with mod A   Transfers   Transfer Safety Comments sit<>stand with min Ax2   Gait/Stairs (Locomotion)   Comment (Gait/Stairs) Not assessed   Balance   Balance Comments impaired sitting and standing balance, requiring min Ax2 for standing balance   Sensory Examination   Sensory Perception Comments unable to assess 2/2 cognition   Clinical Impression   Criteria for Skilled Therapeutic Intervention yes, treatment indicated   PT Diagnosis (PT) impaired functional mobility   Influenced by the following impairments strength, balance, activity tolerance, cognition, post-op precuations   Functional limitations due to impairments bed mobility, transfers, gait, stairs, functional endurance   Clinical Presentation Evolving/Changing   Clinical Presentation Rationale PMH/comorbidities, clinical judgement   Clinical Decision Making (Complexity) moderate complexity   Therapy Frequency (PT) 5x/week   Predicted Duration of Therapy Intervention (days/wks) 3 weeks   Planned Therapy Interventions (PT) balance training;bed mobility training;gait training;patient/family education;strengthening;transfer  training;neuromuscular re-education;postural re-education   Risk & Benefits of therapy have been explained evaluation/treatment results reviewed;care plan/treatment goals reviewed;risks/benefits reviewed;participants included;patient;spouse/significant other   PT Discharge Planning    PT Discharge Recommendation (DC Rec) Transitional Care Facility   PT Rationale for DC Rec Patient below baseline mobility and cognition, will benefit from continued skilled therapy to progress strengh, activity tolerance and functional independence. Pending improved cognition and tolerance for therapy, may be good ARU candidate.   Total Evaluation Time   Total Evaluation Time (Minutes) 5

## 2021-05-18 NOTE — PROGRESS NOTES
Neurosurgery progress note    S: possible aspiration event in the late morning; sodium rising throughout the day; free water increased, IVF decreased; patient might require intubation    O:  Temp:  [97.8  F (36.6  C)-101  F (38.3  C)] 101  F (38.3  C)  Pulse:  [] 103  Resp:  [10-33] 14  MAP:  [71 mmHg-144 mmHg] 108 mmHg  Arterial Line BP: (105-223)/() 171/80  FiO2 (%):  [60 %] 60 %  SpO2:  [91 %-98 %] 92 %    Exam:  Incision: c/d/i, drain and EVD in place  Extubated  Garbled speech, intermittently says name  Intermittently FCx4      ASSESSMENT: 70 year old man presented with AMS found to have aSAH with right supraclinoid aneurysm. mFS4 and HH5. GCS 5. S/p EVD placement, DCA,s/p aneurysm clipping on 5/16.     The following conditions are also present, complicating the patient's current management, as described in the Assessment and Plan:   mechanical ventilation on day of admission, intracerebral hematoma, brain compression, acute respiratory failure, coma, based on current GCS of 5 and cerebral aneurysm rupture with hunt sims scale 5 modified lowery 4     Hypokalemia   Severe malnutrition in the context of acute on chronic illness       RECOMMENDATIONS:  - EVD @ 0  - CTA/CTP (completed)  - Q1hr neuro exam  - SBP<180  - HOB > 30 degrees  - Continuous cardiac monitoring while in ICU   - Nimodipine  - Glucose < 180  - Continue glucose checks  - Platelets > 100,000  - INR < 1.5  - Hemoglobin > 8  - DVT: SCDs while in bed  - Disposition: 4A  - PT/OT consulted        Samuel Nolasco MD, PhD  Neurosurgery Resident PGY-2    Please contact neurosurgery resident on call with questions.    Dial * * [phone], enter [phone] when prompted.

## 2021-05-18 NOTE — PROGRESS NOTES
CLINICAL NUTRITION SERVICES - BRIEF NOTE    Nutrition Prescription    RECOMMENDATIONS FOR MDs/PROVIDERS TO ORDER:  - More aggressive bowel regimen as appro  - Electrolyte replacement per protocol as indicated  (K+)    Recommendations already ordered by Registered Dietitian (RD):  - Once K+ replaced, continue to advance EN as ordered     Future/Additional Recommendations:  - EN advancement   - GI status      Nutrition Progress Note - f/u for progress towards previous nutrition POC (see previous reassessment for details)     Veronica Ramirez RD, LD, [facility]  Neuro ICU  Pager: [phone]

## 2021-05-18 NOTE — OP NOTE
Procedure Date: 05/15/2021    PREOPERATIVE DIAGNOSIS:  Ruptured aneurysm.    POSTOPERATIVE DIAGNOSIS:  Ruptured aneurysm.    PROCEDURES PERFORMED:  1.  Diagnostic cerebral intraoperative angiography and interpretation of imaging.  2.  Right internal carotid artery angiography.  3.  Percutaneous closure of right common femoral arteriotomy with 6-Faroese Angio-Seal device.     SURGEON:  Jamin Martinez MD    FELLOW:  Katelyn Gómez MD    INDICATIONS FOR PROCEDURE:  Mr. Neri is a 70-year-old male with Hunt and Walton V, modified Noonan grade IV, ruptured, aneurysmal, subarachnoid hemorrhage from a right posterior communicating artery aneurysm.  He was taken for emergent clipping of the aneurysm.  This is a dictation for intraoperative angiogram after clipping.    ANESTHESIA:  General anesthesia.    FLUOROSCOPY TIME:  126 seconds.    RADIATION DOSE:  265 mGy.  CONTRAST AMOUNT:  10 mL    DESCRIPTION OF PROCEDURE:  Upon prepping the head, the right common femoral artery was previously accessed in the angiogram suite and sheath retained.  This was prepped and draped in the normal sterile fashion.  The IR team was called after the aneurysm was clipped to perform an intraoperative angiogram.  The right internal carotid artery was selected and angiography performed in modified AP and modified lateral projections of the cranium.     Right internal carotid artery was injected in the modified AP and modified lateral projections of the cranium.  This shows very tortuous cervical, but normal petrous, cavernous, clinoidal, and ophthalmic segments of the right internal carotid artery.  The right communicating segment of the right internal carotid artery is associated with a fetal origin of the posterior communicating artery as well as the previously seen posterior communicating artery.  The right ICA then bifurcates into the BAYLEE and MCA.  The proximal segments appear normal.  The previously seen posterior communicating artery  aneurysm is no longer visualized.  There is a shadow of 2 aneurysm clips.  The posterior communicating artery fills without delay.  There is no kinking appreciated.  There is no residual aneurysm.    The sheath and catheter were removed in their entirety and the right common femoral artery was closed with a 6-[place] Angio-Seal device.     Dr. Martinez was present for the entire procedure.    IMPRESSION:    1.  No residual posterior communicating artery aneurysm.   2.  Baseline filling of the fetal origin of the PCA on the right hand side.    Jamin Martinez MD    As Dictated by ASHU BARRAZA MD        D: 2021   T: 2021   MT: DEVON    Name:     TOD WHITING  MRN:      [id number]        Account:        [id number]   :      1950           Procedure Date: 05/15/2021     Document: [id number]

## 2021-05-19 ENCOUNTER — APPOINTMENT (OUTPATIENT)
Dept: ULTRASOUND IMAGING | Facility: CLINIC | Age: 71
DRG: 003 | End: 2021-05-19
Attending: STUDENT IN AN ORGANIZED HEALTH CARE EDUCATION/TRAINING PROGRAM
Payer: COMMERCIAL

## 2021-05-19 ENCOUNTER — ANESTHESIA EVENT (OUTPATIENT)
Dept: INTENSIVE CARE | Facility: CLINIC | Age: 71
DRG: 003 | End: 2021-05-19
Payer: COMMERCIAL

## 2021-05-19 ENCOUNTER — APPOINTMENT (OUTPATIENT)
Dept: CT IMAGING | Facility: CLINIC | Age: 71
DRG: 003 | End: 2021-05-19
Attending: NURSE PRACTITIONER
Payer: COMMERCIAL

## 2021-05-19 ENCOUNTER — APPOINTMENT (OUTPATIENT)
Dept: GENERAL RADIOLOGY | Facility: CLINIC | Age: 71
DRG: 003 | End: 2021-05-19
Attending: STUDENT IN AN ORGANIZED HEALTH CARE EDUCATION/TRAINING PROGRAM
Payer: COMMERCIAL

## 2021-05-19 ENCOUNTER — ANESTHESIA (OUTPATIENT)
Dept: INTENSIVE CARE | Facility: CLINIC | Age: 71
DRG: 003 | End: 2021-05-19
Payer: COMMERCIAL

## 2021-05-19 LAB
ALBUMIN UR-MCNC: 10 MG/DL
ANION GAP SERPL CALCULATED.3IONS-SCNC: 3 MMOL/L (ref 3–14)
APPEARANCE UR: CLEAR
BASE EXCESS BLDA CALC-SCNC: 1 MMOL/L
BASE EXCESS BLDA CALC-SCNC: 3 MMOL/L
BASE EXCESS BLDA CALC-SCNC: 3.7 MMOL/L
BILIRUB UR QL STRIP: NEGATIVE
BLD PROD TYP BPU: NORMAL
BLD UNIT ID BPU: 0
BLOOD PRODUCT CODE: NORMAL
BPU ID: NORMAL
BUN SERPL-MCNC: 22 MG/DL (ref 7–30)
CALCIUM SERPL-MCNC: 9.3 MG/DL (ref 8.5–10.1)
CHLORIDE SERPL-SCNC: 121 MMOL/L (ref 94–109)
CO2 SERPL-SCNC: 28 MMOL/L (ref 20–32)
COLOR UR AUTO: YELLOW
CREAT SERPL-MCNC: 0.66 MG/DL (ref 0.66–1.25)
ERYTHROCYTE [DISTWIDTH] IN BLOOD BY AUTOMATED COUNT: 13 % (ref 10–15)
GFR SERPL CREATININE-BSD FRML MDRD: >90 ML/MIN/{1.73_M2}
GLUCOSE BLDC GLUCOMTR-MCNC: 139 MG/DL (ref 70–99)
GLUCOSE BLDC GLUCOMTR-MCNC: 152 MG/DL (ref 70–99)
GLUCOSE BLDC GLUCOMTR-MCNC: 159 MG/DL (ref 70–99)
GLUCOSE BLDC GLUCOMTR-MCNC: 180 MG/DL (ref 70–99)
GLUCOSE BLDC GLUCOMTR-MCNC: 180 MG/DL (ref 70–99)
GLUCOSE SERPL-MCNC: 161 MG/DL (ref 70–99)
GLUCOSE UR STRIP-MCNC: NEGATIVE MG/DL
GRAM STN SPEC: NORMAL
GRAM STN SPEC: NORMAL
HCO3 BLD-SCNC: 25 MMOL/L (ref 21–28)
HCO3 BLD-SCNC: 25 MMOL/L (ref 21–28)
HCO3 BLD-SCNC: 28 MMOL/L (ref 21–28)
HCT VFR BLD AUTO: 35.4 % (ref 40–53)
HGB BLD-MCNC: 11.7 G/DL (ref 13.3–17.7)
HGB UR QL STRIP: NEGATIVE
KETONES UR STRIP-MCNC: NEGATIVE MG/DL
LEUKOCYTE ESTERASE UR QL STRIP: NEGATIVE
MAGNESIUM SERPL-MCNC: 2.4 MG/DL (ref 1.6–2.3)
MCH RBC QN AUTO: 30.4 PG (ref 26.5–33)
MCHC RBC AUTO-ENTMCNC: 33.1 G/DL (ref 31.5–36.5)
MCV RBC AUTO: 92 FL (ref 78–100)
MUCOUS THREADS #/AREA URNS LPF: PRESENT /LPF
NITRATE UR QL: NEGATIVE
O2/TOTAL GAS SETTING VFR VENT: 50 %
O2/TOTAL GAS SETTING VFR VENT: 60 %
O2/TOTAL GAS SETTING VFR VENT: ABNORMAL %
OXYHGB MFR BLD: 91 % (ref 92–100)
OXYHGB MFR BLD: 93 % (ref 92–100)
PCO2 BLD: 29 MM HG (ref 35–45)
PCO2 BLD: 35 MM HG (ref 35–45)
PCO2 BLD: 42 MM HG (ref 35–45)
PH BLD: 7.43 PH (ref 7.35–7.45)
PH BLD: 7.46 PH (ref 7.35–7.45)
PH BLD: 7.55 PH (ref 7.35–7.45)
PH UR STRIP: 5.5 PH (ref 5–7)
PHOSPHATE SERPL-MCNC: 2.5 MG/DL (ref 2.5–4.5)
PLATELET # BLD AUTO: 258 10E9/L (ref 150–450)
PO2 BLD: 57 MM HG (ref 80–105)
PO2 BLD: 65 MM HG (ref 80–105)
PO2 BLD: 70 MM HG (ref 80–105)
POTASSIUM SERPL-SCNC: 3.5 MMOL/L (ref 3.4–5.3)
RBC # BLD AUTO: 3.85 10E12/L (ref 4.4–5.9)
RBC #/AREA URNS AUTO: 4 /HPF (ref 0–2)
SODIUM SERPL-SCNC: 150 MMOL/L (ref 133–144)
SODIUM SERPL-SCNC: 151 MMOL/L (ref 133–144)
SOURCE: ABNORMAL
SP GR UR STRIP: 1.03 (ref 1–1.03)
SPECIMEN SOURCE: NORMAL
SQUAMOUS #/AREA URNS AUTO: 0 /HPF (ref 0–1)
TRANS CELLS #/AREA URNS HPF: <1 /HPF (ref 0–1)
TRANSFUSION STATUS PATIENT QL: NORMAL
UROBILINOGEN UR STRIP-MCNC: 2 MG/DL (ref 0–2)
WBC # BLD AUTO: 14.3 10E9/L (ref 4–11)
WBC #/AREA URNS AUTO: 5 /HPF (ref 0–5)

## 2021-05-19 PROCEDURE — 82803 BLOOD GASES ANY COMBINATION: CPT | Performed by: STUDENT IN AN ORGANIZED HEALTH CARE EDUCATION/TRAINING PROGRAM

## 2021-05-19 PROCEDURE — 87077 CULTURE AEROBIC IDENTIFY: CPT | Performed by: NURSE PRACTITIONER

## 2021-05-19 PROCEDURE — 85027 COMPLETE CBC AUTOMATED: CPT | Performed by: STUDENT IN AN ORGANIZED HEALTH CARE EDUCATION/TRAINING PROGRAM

## 2021-05-19 PROCEDURE — 0042T CT HEAD PERFUSION WITH CONTRAST: CPT

## 2021-05-19 PROCEDURE — 93886 INTRACRANIAL COMPLETE STUDY: CPT | Mod: 26 | Performed by: RADIOLOGY

## 2021-05-19 PROCEDURE — 83735 ASSAY OF MAGNESIUM: CPT | Performed by: STUDENT IN AN ORGANIZED HEALTH CARE EDUCATION/TRAINING PROGRAM

## 2021-05-19 PROCEDURE — 93886 INTRACRANIAL COMPLETE STUDY: CPT

## 2021-05-19 PROCEDURE — 250N000011 HC RX IP 250 OP 636: Performed by: RADIOLOGY

## 2021-05-19 PROCEDURE — 999N000076 HC STATISTIC ICP MONITORING

## 2021-05-19 PROCEDURE — 87040 BLOOD CULTURE FOR BACTERIA: CPT | Performed by: NURSE PRACTITIONER

## 2021-05-19 PROCEDURE — 999N000185 HC STATISTIC TRANSPORT TIME EA 15 MIN

## 2021-05-19 PROCEDURE — 272N000272 HC CONTINUOUS NEBULIZER MICRO PUMP

## 2021-05-19 PROCEDURE — 82805 BLOOD GASES W/O2 SATURATION: CPT | Performed by: NURSE PRACTITIONER

## 2021-05-19 PROCEDURE — 87186 SC STD MICRODIL/AGAR DIL: CPT | Performed by: NURSE PRACTITIONER

## 2021-05-19 PROCEDURE — 250N000012 HC RX MED GY IP 250 OP 636 PS 637: Performed by: NURSE PRACTITIONER

## 2021-05-19 PROCEDURE — 250N000013 HC RX MED GY IP 250 OP 250 PS 637: Performed by: STUDENT IN AN ORGANIZED HEALTH CARE EDUCATION/TRAINING PROGRAM

## 2021-05-19 PROCEDURE — 250N000009 HC RX 250: Performed by: STUDENT IN AN ORGANIZED HEALTH CARE EDUCATION/TRAINING PROGRAM

## 2021-05-19 PROCEDURE — 99291 CRITICAL CARE FIRST HOUR: CPT | Mod: GC | Performed by: PSYCHIATRY & NEUROLOGY

## 2021-05-19 PROCEDURE — 87205 SMEAR GRAM STAIN: CPT | Performed by: NURSE PRACTITIONER

## 2021-05-19 PROCEDURE — 87070 CULTURE OTHR SPECIMN AEROBIC: CPT | Performed by: NURSE PRACTITIONER

## 2021-05-19 PROCEDURE — 70496 CT ANGIOGRAPHY HEAD: CPT

## 2021-05-19 PROCEDURE — 370N000003 HC ANESTHESIA WARD SERVICE

## 2021-05-19 PROCEDURE — 80048 BASIC METABOLIC PNL TOTAL CA: CPT | Performed by: STUDENT IN AN ORGANIZED HEALTH CARE EDUCATION/TRAINING PROGRAM

## 2021-05-19 PROCEDURE — 250N000013 HC RX MED GY IP 250 OP 250 PS 637: Performed by: NURSE PRACTITIONER

## 2021-05-19 PROCEDURE — 250N000011 HC RX IP 250 OP 636: Performed by: STUDENT IN AN ORGANIZED HEALTH CARE EDUCATION/TRAINING PROGRAM

## 2021-05-19 PROCEDURE — 70498 CT ANGIOGRAPHY NECK: CPT | Mod: 26 | Performed by: RADIOLOGY

## 2021-05-19 PROCEDURE — 94640 AIRWAY INHALATION TREATMENT: CPT

## 2021-05-19 PROCEDURE — 200N000002 HC R&B ICU UMMC

## 2021-05-19 PROCEDURE — 250N000011 HC RX IP 250 OP 636

## 2021-05-19 PROCEDURE — 84100 ASSAY OF PHOSPHORUS: CPT | Performed by: STUDENT IN AN ORGANIZED HEALTH CARE EDUCATION/TRAINING PROGRAM

## 2021-05-19 PROCEDURE — 999N000157 HC STATISTIC RCP TIME EA 10 MIN

## 2021-05-19 PROCEDURE — 81001 URINALYSIS AUTO W/SCOPE: CPT | Performed by: NURSE PRACTITIONER

## 2021-05-19 PROCEDURE — 99207 CT HEAD W/O CONTRAST: CPT | Mod: 26 | Performed by: RADIOLOGY

## 2021-05-19 PROCEDURE — 250N000013 HC RX MED GY IP 250 OP 250 PS 637: Performed by: NEUROLOGICAL SURGERY

## 2021-05-19 PROCEDURE — 94003 VENT MGMT INPAT SUBQ DAY: CPT

## 2021-05-19 PROCEDURE — 250N000009 HC RX 250: Performed by: NURSE PRACTITIONER

## 2021-05-19 PROCEDURE — 250N000011 HC RX IP 250 OP 636: Performed by: NURSE ANESTHETIST, CERTIFIED REGISTERED

## 2021-05-19 PROCEDURE — 999N000065 XR CHEST PORT 1 VIEW

## 2021-05-19 PROCEDURE — 250N000011 HC RX IP 250 OP 636: Performed by: NURSE PRACTITIONER

## 2021-05-19 PROCEDURE — 70496 CT ANGIOGRAPHY HEAD: CPT | Mod: 26 | Performed by: RADIOLOGY

## 2021-05-19 PROCEDURE — 999N001017 HC STATISTIC GLUCOSE BY METER IP

## 2021-05-19 PROCEDURE — 84295 ASSAY OF SERUM SODIUM: CPT | Performed by: NURSE PRACTITIONER

## 2021-05-19 PROCEDURE — 999N000015 HC STATISTIC ARTERIAL MONITORING DAILY

## 2021-05-19 PROCEDURE — 258N000003 HC RX IP 258 OP 636: Performed by: NURSE PRACTITIONER

## 2021-05-19 PROCEDURE — 5A1955Z RESPIRATORY VENTILATION, GREATER THAN 96 CONSECUTIVE HOURS: ICD-10-PCS | Performed by: PSYCHIATRY & NEUROLOGY

## 2021-05-19 PROCEDURE — 82805 BLOOD GASES W/O2 SATURATION: CPT | Performed by: PSYCHIATRY & NEUROLOGY

## 2021-05-19 PROCEDURE — 0042T CT HEAD PERFUSION WITH CONTRAST: CPT | Mod: GC | Performed by: RADIOLOGY

## 2021-05-19 PROCEDURE — 250N000009 HC RX 250: Performed by: NURSE ANESTHETIST, CERTIFIED REGISTERED

## 2021-05-19 PROCEDURE — 71045 X-RAY EXAM CHEST 1 VIEW: CPT | Mod: 26 | Performed by: RADIOLOGY

## 2021-05-19 PROCEDURE — 70450 CT HEAD/BRAIN W/O DYE: CPT

## 2021-05-19 RX ORDER — IPRATROPIUM BROMIDE AND ALBUTEROL SULFATE 2.5; .5 MG/3ML; MG/3ML
3 SOLUTION RESPIRATORY (INHALATION)
Status: DISCONTINUED | OUTPATIENT
Start: 2021-05-19 | End: 2021-05-22

## 2021-05-19 RX ORDER — NOREPINEPHRINE BITARTRATE 0.06 MG/ML
0.03-0.4 INJECTION, SOLUTION INTRAVENOUS CONTINUOUS
Status: DISCONTINUED | OUTPATIENT
Start: 2021-05-19 | End: 2021-05-19

## 2021-05-19 RX ORDER — PROPOFOL 10 MG/ML
INJECTION, EMULSION INTRAVENOUS
Status: COMPLETED
Start: 2021-05-19 | End: 2021-05-19

## 2021-05-19 RX ORDER — IOPAMIDOL 755 MG/ML
115 INJECTION, SOLUTION INTRAVASCULAR ONCE
Status: COMPLETED | OUTPATIENT
Start: 2021-05-19 | End: 2021-05-19

## 2021-05-19 RX ORDER — SODIUM CHLORIDE, SODIUM GLUCONATE, SODIUM ACETATE, POTASSIUM CHLORIDE AND MAGNESIUM CHLORIDE 526; 502; 368; 37; 30 MG/100ML; MG/100ML; MG/100ML; MG/100ML; MG/100ML
50 INJECTION, SOLUTION INTRAVENOUS CONTINUOUS
Status: DISCONTINUED | OUTPATIENT
Start: 2021-05-19 | End: 2021-05-22

## 2021-05-19 RX ORDER — PROPOFOL 10 MG/ML
5-75 INJECTION, EMULSION INTRAVENOUS CONTINUOUS
Status: DISCONTINUED | OUTPATIENT
Start: 2021-05-19 | End: 2021-05-20

## 2021-05-19 RX ORDER — PROPOFOL 10 MG/ML
INJECTION, EMULSION INTRAVENOUS PRN
Status: DISCONTINUED | OUTPATIENT
Start: 2021-05-19 | End: 2021-05-19

## 2021-05-19 RX ORDER — FAMOTIDINE 20 MG/1
20 TABLET, FILM COATED ORAL 2 TIMES DAILY
Status: DISCONTINUED | OUTPATIENT
Start: 2021-05-19 | End: 2021-06-03

## 2021-05-19 RX ORDER — SODIUM CHLORIDE FOR INHALATION 3 %
3 VIAL, NEBULIZER (ML) INHALATION EVERY 6 HOURS
Status: DISCONTINUED | OUTPATIENT
Start: 2021-05-19 | End: 2021-05-20

## 2021-05-19 RX ORDER — POTASSIUM CHLORIDE 1.5 G/1.58G
20 POWDER, FOR SOLUTION ORAL ONCE
Status: COMPLETED | OUTPATIENT
Start: 2021-05-19 | End: 2021-05-19

## 2021-05-19 RX ORDER — NICOTINE POLACRILEX 4 MG
15-30 LOZENGE BUCCAL
Status: DISCONTINUED | OUTPATIENT
Start: 2021-05-19 | End: 2021-05-20

## 2021-05-19 RX ORDER — NOREPINEPHRINE BITARTRATE 0.06 MG/ML
INJECTION, SOLUTION INTRAVENOUS
Status: DISCONTINUED
Start: 2021-05-19 | End: 2021-05-19 | Stop reason: HOSPADM

## 2021-05-19 RX ORDER — HEPARIN SODIUM 5000 [USP'U]/.5ML
5000 INJECTION, SOLUTION INTRAVENOUS; SUBCUTANEOUS EVERY 8 HOURS
Status: DISCONTINUED | OUTPATIENT
Start: 2021-05-19 | End: 2021-06-11 | Stop reason: HOSPADM

## 2021-05-19 RX ORDER — DEXTROSE MONOHYDRATE 25 G/50ML
25-50 INJECTION, SOLUTION INTRAVENOUS
Status: DISCONTINUED | OUTPATIENT
Start: 2021-05-19 | End: 2021-05-20

## 2021-05-19 RX ADMIN — INSULIN ASPART 1 UNITS: 100 INJECTION, SOLUTION INTRAVENOUS; SUBCUTANEOUS at 23:47

## 2021-05-19 RX ADMIN — INSULIN ASPART 1 UNITS: 100 INJECTION, SOLUTION INTRAVENOUS; SUBCUTANEOUS at 19:37

## 2021-05-19 RX ADMIN — POTASSIUM CHLORIDE 20 MEQ: 1.5 POWDER, FOR SOLUTION ORAL at 03:58

## 2021-05-19 RX ADMIN — Medication 10 ML: at 03:58

## 2021-05-19 RX ADMIN — Medication 25 MCG/HR: at 12:56

## 2021-05-19 RX ADMIN — PROPOFOL 15 MCG/KG/MIN: 10 INJECTION, EMULSION INTRAVENOUS at 08:39

## 2021-05-19 RX ADMIN — DOCUSATE SODIUM 50 MG AND SENNOSIDES 8.6 MG 1 TABLET: 8.6; 5 TABLET, FILM COATED ORAL at 19:34

## 2021-05-19 RX ADMIN — IPRATROPIUM BROMIDE AND ALBUTEROL SULFATE 3 ML: .5; 3 SOLUTION RESPIRATORY (INHALATION) at 14:20

## 2021-05-19 RX ADMIN — NIMODIPINE 60 MG: 30 SOLUTION ORAL at 15:14

## 2021-05-19 RX ADMIN — NIMODIPINE 60 MG: 30 SOLUTION ORAL at 19:34

## 2021-05-19 RX ADMIN — QUETIAPINE FUMARATE 25 MG: 25 TABLET ORAL at 08:30

## 2021-05-19 RX ADMIN — ACETAMINOPHEN 975 MG: 325 TABLET, FILM COATED ORAL at 08:30

## 2021-05-19 RX ADMIN — Medication 10 ML: at 19:34

## 2021-05-19 RX ADMIN — SODIUM CHLORIDE, SODIUM GLUCONATE, SODIUM ACETATE, POTASSIUM CHLORIDE AND MAGNESIUM CHLORIDE 100 ML/HR: 526; 502; 368; 37; 30 INJECTION, SOLUTION INTRAVENOUS at 22:27

## 2021-05-19 RX ADMIN — POLYETHYLENE GLYCOL 3350 17 G: 17 POWDER, FOR SOLUTION ORAL at 15:21

## 2021-05-19 RX ADMIN — Medication 10 ML: at 08:53

## 2021-05-19 RX ADMIN — Medication 10 ML: at 23:48

## 2021-05-19 RX ADMIN — HEPARIN SODIUM 5000 UNITS: 5000 INJECTION, SOLUTION INTRAVENOUS; SUBCUTANEOUS at 23:50

## 2021-05-19 RX ADMIN — ACETAMINOPHEN 975 MG: 325 TABLET, FILM COATED ORAL at 15:20

## 2021-05-19 RX ADMIN — SODIUM CHLORIDE, SODIUM GLUCONATE, SODIUM ACETATE, POTASSIUM CHLORIDE AND MAGNESIUM CHLORIDE 100 ML/HR: 526; 502; 368; 37; 30 INJECTION, SOLUTION INTRAVENOUS at 12:24

## 2021-05-19 RX ADMIN — Medication 10 ML: at 13:02

## 2021-05-19 RX ADMIN — NIMODIPINE 60 MG: 30 SOLUTION ORAL at 13:02

## 2021-05-19 RX ADMIN — Medication 15 ML: at 08:30

## 2021-05-19 RX ADMIN — Medication 10 ML: at 15:18

## 2021-05-19 RX ADMIN — ROCURONIUM BROMIDE 100 MG: 10 INJECTION INTRAVENOUS at 08:41

## 2021-05-19 RX ADMIN — IOPAMIDOL 115 ML: 755 INJECTION, SOLUTION INTRAVENOUS at 14:03

## 2021-05-19 RX ADMIN — ACETAMINOPHEN 975 MG: 325 TABLET, FILM COATED ORAL at 23:50

## 2021-05-19 RX ADMIN — PROPOFOL 100 MG: 10 INJECTION, EMULSION INTRAVENOUS at 08:41

## 2021-05-19 RX ADMIN — SODIUM CHLORIDE SOLN NEBU 3% 3 ML: 3 NEBU SOLN at 14:19

## 2021-05-19 RX ADMIN — DOCUSATE SODIUM 50 MG AND SENNOSIDES 8.6 MG 2 TABLET: 8.6; 5 TABLET, FILM COATED ORAL at 08:30

## 2021-05-19 RX ADMIN — PROPOFOL 20 MCG/KG/MIN: 10 INJECTION, EMULSION INTRAVENOUS at 16:03

## 2021-05-19 RX ADMIN — NIMODIPINE 60 MG: 30 SOLUTION ORAL at 03:59

## 2021-05-19 RX ADMIN — NIMODIPINE 60 MG: 30 SOLUTION ORAL at 23:48

## 2021-05-19 RX ADMIN — FAMOTIDINE 20 MG: 20 TABLET ORAL at 19:33

## 2021-05-19 RX ADMIN — SODIUM CHLORIDE, POTASSIUM CHLORIDE, SODIUM LACTATE AND CALCIUM CHLORIDE 500 ML: 600; 310; 30; 20 INJECTION, SOLUTION INTRAVENOUS at 12:36

## 2021-05-19 RX ADMIN — NIMODIPINE 60 MG: 30 SOLUTION ORAL at 08:53

## 2021-05-19 RX ADMIN — HEPARIN SODIUM 5000 UNITS: 5000 INJECTION, SOLUTION INTRAVENOUS; SUBCUTANEOUS at 17:44

## 2021-05-19 ASSESSMENT — ACTIVITIES OF DAILY LIVING (ADL)
ADLS_ACUITY_SCORE: 23

## 2021-05-19 ASSESSMENT — VISUAL ACUITY
OU: OTHER (SEE COMMENT)

## 2021-05-19 ASSESSMENT — MIFFLIN-ST. JEOR: SCORE: 1490.13

## 2021-05-19 NOTE — PLAN OF CARE
OT 4A: Cancel and HOLD.  Per discussion with RN, pt with worsening respiratory status and more obtunded this AM.  Possible need for intubation.  Not medically appropriate for therapy this AM. PT is following for ROM and mobility as able.  Will HOLD OT at this time until pt able to engage in 2 therapies.

## 2021-05-19 NOTE — PROGRESS NOTES
"Neuroscience Intensive Care Progress Note    Problem List  1.  Aneurysmal subarachnoid hemorrhage, HH 5, MF 4, bleed day 5/15  2.  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  Hydrocephalus status post EVD 5/15  4.  Hypertension  5.  Acute hypoxic respiratory failure  6.  Hypernatremia  7.  Hyperchloremia  8.  Leukocytosis  9.  Normocytic anemia    Intake/Output Summary (Last 24 hours) at 5/19/2021 0711  Last data filed at 5/19/2021 0700  Gross per 24 hour   Intake 3097.92 ml   Output 1903 ml   Net 1194.92 ml           Arterial Line BP: (113-223)/() 135/61  MAP:  [80 mmHg-144 mmHg] 82 mmHg    Current Medications:    acetaminophen  975 mg Oral or Feeding Tube Q8H MARAH     multivitamins w/minerals  15 mL Per Feeding Tube Daily     niMODipine  60 mg Oral or Feeding Tube Q4H MARAH    And     sodium chloride 0.9 %  10 mL Oral or Feeding Tube Q4H MARAH     polyethylene glycol  17 g Oral or Feeding Tube Daily     senna-docusate  1 tablet Oral or Feeding Tube BID    Or     senna-docusate  2 tablet Oral or Feeding Tube BID       PRN Medications:  bisacodyl, hydrALAZINE, labetalol, naloxone **OR** naloxone **OR** naloxone **OR** naloxone, ondansetron **OR** ondansetron, oxyCODONE, prochlorperazine **OR** prochlorperazine **OR** prochlorperazine, QUEtiapine    Infusions:    lactated ringers 10 mL/hr at 05/19/21 0700     niCARdipine 2.5 mg/hr (05/19/21 0700)       No Known Allergies    Physical Examination:  BP (!) 237/103   Pulse 98   Temp 98.4  F (36.9  C) (Axillary)   Resp 28   Ht 1.803 m (5' 11\")   Wt 69.8 kg (153 lb 14.1 oz)   SpO2 92%   BMI 21.46 kg/m      EXAM:  Examined off sedation. Mr Neri is examined in the supine position. He opens his eyes to gentle touch, follows simple commands to wiggle toes. Responds \"yes\" to loud voice asking if he can hear me. Tracks but not briskly. Pupils are symmetric and react to light.  No facial asymmetry.  His arms moves purporsefully when agitated with " purpose with antigravity strength.  His legs move briskly purposefully. Sensation appears grossly intact by grimace to noxious stimulus.  On Room air. Lung sounds clear.  Regular cardiac rhythm.  Soft abdomen with bowel sounds.  Groin site clean without evidence of hematoma.  No peripheral edema.    Labs/Studies:  Recent Labs   Lab Test 05/16/21  0405 05/15/21  2125 05/15/21  1526 05/15/21  1311 05/15/21  1222   * 146* 144 139 131*   POTASSIUM 3.5 3.8 3.7 3.2* 4.1   CHLORIDE 115* 113*  --  109 103   CO2 27 27  --  24 22   ANIONGAP 6 5  --  6 6   * 142* 125* 156* 215*   BUN 16 16  --  14 15   CR 0.88 0.99  --  0.78 0.78   RAGINI 8.9 8.8  --  7.4* 8.3*   WBC 15.0* 12.3*  --   --  16.5*   RBC 3.74* 3.85*  --   --  4.43   HGB 11.7* 11.8* 13.1*  --  13.6    239  --   --  257       Recent Labs   Lab Test 05/15/21  2125 05/15/21  1222 05/15/21  0930   INR 1.20* 1.16* 1.07   PTT 30 29 27       Recent Labs   Lab 05/16/21  1223 05/15/21  1526 05/15/21  1222   PH 7.45 7.46* 7.39   PCO2 30* 33* 38   PO2 137* 145* 269*   HCO3 21 23 23   O2PER 30 40.0 60       Imaging and labs reviewed personally in the EMR    Assessment:  Mr Neri is a 70-year-old gentleman currently admitted for management of aneurysmal subarachnoid hemorrhage.  He is now post bleed day 4, postop day 4 for right P-comm aneurysm clipping. Continues to do well. Required intubation this morning. But continues to follow commands and wiggle toes. Will continue to monitor. Plan for close monitoring for neurologic changes associated with vasospasm (beginning of vasospasm window currently) and/or worsening hydrocephalus.      Plan:    Today:  - CTA/CTP given RF subacute infarcts and increased TCDs in R BAYLEE  - intubated this AM, repeat ABG looks improved; Will obtain repeat ABG and CXR tomorrow.  - UA/Ucx, Bcx, sputum cx for febrile to 38.3; procal for tmr AM  - Plasmalyte at 100/hr for hypovolemia; LR 500cc Bolus   - Begin famotidine for GI ppx  -  Stop oxycodone; seroquel  - Start fentanyl drip; wean propofol as able  - FWF to 100q12; Sodium q12;  - Begin ISS medium resistance    Neuro:   #Aneurysmal subarachnoid hemorrhage, HH 5, MF 4, bleed day 5/15  #Ruptured right P-comm aneurysm status post clipping 5/15  #IVH with subsequent hydrocephalus status post LF EVD 5/15    -- CT head yesterday RF subacute infarct, read report says correponds to R BAYLEE spasm, although moving LLE briskly today.   -- LF EVD @ 0 (moved from 10 last night); ICPs 4-10. 263 (301 yesterday), 82 since midnight  -Hourly neurochecks  -Head of bed elevated  -Nimodipine  -- Keppra stopped; aneurysm secured  -Normothermia, Euglycemia  -Normonatremia; allowing hypernatremia (Repeat Na 151 today)  -Daily TCD; yesterday WNL w/increased R BAYLEE to 93; TCDs normalized today  -Baseline CTA/CTP done  - Repeat CTA/CTP today  -SBP less than 180     #agitation  - OK for fentanyl bumps prn    - delirium precautions    #pain management  - Fentanyl drip 25-50mcg/hr   - Propofol for target RASS 0 to -1; wean as able  - stopped oxycodone    Resp:   #Acute hypoxic respiratory failure  -- Begin duo nebs,  -- Saline 3% nebulizer  -- Chest Physiotherapy with bag suctioning   -- initial ABG showing respiratory alkalosis 2/2 tachypnea; repeat ABG improved    #Intubated  - CMV-AC; wean FiO2 before weaning PEEP  - Repeat ABG tomorrow AM  - Repeat CXR tomororw AM    CV:   #Hypertension, relative; PTA lisinopril  # SBP 130s-160s; HR   #Normal EF  -SBP less than 200  -Nicardipine drip running this morning now off    Renal:   #Hypernatremia  #Hyperchloremia  -Goal normonatremia; tdlttejas Na - 151 from 150; with concentrated urine, -2.2L since admission, +900mL yesterday; Will continue to monitor  - Q12 sodium  -Monitor BMP  -- 1 x  now  - Plasma-Lyte 100/hr  -- FWF at 100q4 --> 100q2 today.    Endo: No active issues.  Monitor glucose.  Hypoglycemia protocol.   - glucose 161 this AM;   - Begin ISS medium  resistance    Heme:   #Leukocytosis  #Normocytic anemia  -Monitor CBC and vital signs    GI:   #nutrition  #NGT in place  - TF @ 40; advancing to goal  -Nutrition services consulted  -Free water flush (100q2)  --Adding bowel regimen    ID:     #aspiration pneumonitis vs pneumonia; with worsening oxygenation  - Febrile to 38.3 this AM, will panculture;  - Procal sent for tomorrow    PPX:     DVT: SQH [id number]    GI:  famotidine 20mg BID     Lines and Tubes:  -Right radial arterial line 5/15  -Left frontal EVD 5/15  -Left subclavian triple-lumen catheter 5/15  -Indwelling urinary catheter 5/18  -Nasogastric tube 5/16; repositioned on 5/17    Code Status: Full code    Dispo: 4A ICU. He will require ICU care for at least 1 to 2 weeks.    Patient seen and discussed with     David Sands MD  Neuro ICU Resident PGY-1    05/19/2021

## 2021-05-19 NOTE — PLAN OF CARE
PT4AB: cancel- pt now intubated, not medically appropriate for therapies today. Will re-schedule per POC.

## 2021-05-19 NOTE — PLAN OF CARE
Major Shift Events:  Oriented to self only. Intermittently follows commands, able to wiggle toes and squeeze hands. Moves all extremities spontaneously. EVD @ 0, ICP's 2-10, output 5-20. Sinus rhythm, occasional PVC's. BP within goal of SBP < 200. Nicardipine gtt @ 2.5. Afebrile. Room air. TF @ 30, goal 60, FWF increased to 100 Q2H for hypernatremia. No BM. Gutierrez in place for retention, good output.   Plan: Continue with plan of care. Contact neuro surg with ayn concerns.   For vital signs and complete assessments, please see documentation flowsheets.

## 2021-05-19 NOTE — PROGRESS NOTES
Neurosurgery progress note    S: Re-intubated yesterday to protect airway; no acute events overnight    O:  Temp:  [99  F (37.2  C)-100.3  F (37.9  C)] 99.5  F (37.5  C)  Pulse:  [] 89  Resp:  [14-52] 19  MAP:  [68 mmHg-136 mmHg] 115 mmHg  Arterial Line BP: ()/(57-94) 183/83  FiO2 (%):  [40 %-80 %] 40 %  SpO2:  [89 %-100 %] 94 %    Exam:  Incision: c/d/i, drain and EVD in place  Localizes LUE, trace movement RUE  W/d b/l LE      ASSESSMENT: 70 year old man presented with AMS found to have aSAH with right supraclinoid aneurysm. mFS4 and HH5. GCS 5. S/p EVD placement, DCA,s/p aneurysm clipping on 5/16.     The following conditions are also present, complicating the patient's current management, as described in the Assessment and Plan:   mechanical ventilation on day of admission, intracerebral hematoma, brain compression, acute respiratory failure, coma, based on current GCS of 5 and cerebral aneurysm rupture with hunt sims scale 5 modified lowery 4     Hypokalemia   Severe malnutrition in the context of acute on chronic illness  Respiratory failure       RECOMMENDATIONS:  - EVD @ 0  - CTA/CTP (completed)  - Q1hr neuro exam  - SBP<180  - HOB > 30 degrees  - Continuous cardiac monitoring while in ICU   - Nimodipine  - Glucose < 180  - Continue glucose checks  - Platelets > 100,000  - INR < 1.5  - Hemoglobin > 8  - DVT: SCDs while in bed  - Disposition: 4A  - PT/OT consulted        Samuel Nolasco MD, PhD  Neurosurgery Resident PGY-2    Please contact neurosurgery resident on call with questions.    Dial * * [phone], enter [phone] when prompted.

## 2021-05-19 NOTE — PLAN OF CARE
Major Shift Events  Neuro: EVD @ 0 EAM. ICPs 2-17, red/ orange output 8-15mL. Localizes/ withdraws. Moves RLE spontaneously. Opens eyes to pain Pupils equal (see pupillometer note). Mitts on. q4hour neuros. CV: SR/ SB rare-occasional PVCs. SBP <200, nicardipine off since AM. tmax 101.0 F, tylenol now scheduled. Cultures sent.  Resp: pt intubated at 0840 d/t unable to clear secretions, tachypnea, decreased mentation, poor air exchange (per ABG this AM). 50%, 500, 14, PEEP 10.   GI/: TF advanced to 50 mL/hr,  [id number]. Bm today. Gutierrez w/ good UO. BG levels stable.  Gtts: Fentanyl@25 mcg/hr, propofol@20 mcg/hr, LR@10mL/hr, plasmalyte@100mL/hr. K93lbop Na.  Pt had CTA and perfussion today.    Plan: daily TCDs. [id number] Na. Monitor UA, sputum, and blood cultures. Possible CT in AM. PST tomorrow in AM., wean vent settings as tolerated. For vital signs and complete assessments, please see documentation flowsheets.

## 2021-05-19 NOTE — PROGRESS NOTES
Pupillometer #'s:  R pupil   L pupil  Npi 4.2   Npi 4.7  Size 3.76  Size 3.08  MIN 2.74  MIN 2.10  CH 27%  CH 32%  CV 1.92  CV 1.75  MCV 2.78  MCV 2.83  LAT 0.27  LAT 0.23  DV 0.56  DV 0.72  Rohan Anne RN on 5/19/2021 at 6:11 PM

## 2021-05-19 NOTE — PROGRESS NOTES
Major Shift Events:  Pt is oriented to self only. Pt will state name intermittently. Pt went to CT for follow up imaging during the morning. Pt remains restless and moves all extremities around excessively. S/P the 0500 fournier removal, pt was not able to void within 6 hours. Per MD a new urinary catheter was placed. At 1500, pt's pupils were assessed to be unequal (Left 2 / Right 3). Both pupils react briskly but are unequal in size. MD notified. Pt receives 100 q4h of free water flush and pt received a 1L bolus of LR. Pt's SBP is now to be <180.  Nicardipene GTT was started to adhere to goal SBP. At 1800 pt's EVD was lowered to 0 above EAM per MD. No BM during this shift.        Plan: Increase TF to 30 mL/hr at 2100. Continue with plan of care. Notify MD of any changes or concerns.

## 2021-05-20 ENCOUNTER — APPOINTMENT (OUTPATIENT)
Dept: PHYSICAL THERAPY | Facility: CLINIC | Age: 71
DRG: 003 | End: 2021-05-20
Attending: NEUROLOGICAL SURGERY
Payer: COMMERCIAL

## 2021-05-20 ENCOUNTER — APPOINTMENT (OUTPATIENT)
Dept: GENERAL RADIOLOGY | Facility: CLINIC | Age: 71
DRG: 003 | End: 2021-05-20
Attending: NURSE PRACTITIONER
Payer: COMMERCIAL

## 2021-05-20 ENCOUNTER — APPOINTMENT (OUTPATIENT)
Dept: ULTRASOUND IMAGING | Facility: CLINIC | Age: 71
DRG: 003 | End: 2021-05-20
Attending: STUDENT IN AN ORGANIZED HEALTH CARE EDUCATION/TRAINING PROGRAM
Payer: COMMERCIAL

## 2021-05-20 LAB
ANION GAP SERPL CALCULATED.3IONS-SCNC: 4 MMOL/L (ref 3–14)
BASE EXCESS BLDA CALC-SCNC: 3.1 MMOL/L
BUN SERPL-MCNC: 28 MG/DL (ref 7–30)
CALCIUM SERPL-MCNC: 8.7 MG/DL (ref 8.5–10.1)
CHLORIDE SERPL-SCNC: 119 MMOL/L (ref 94–109)
CO2 SERPL-SCNC: 27 MMOL/L (ref 20–32)
CREAT SERPL-MCNC: 0.68 MG/DL (ref 0.66–1.25)
ERYTHROCYTE [DISTWIDTH] IN BLOOD BY AUTOMATED COUNT: 13.2 % (ref 10–15)
GFR SERPL CREATININE-BSD FRML MDRD: >90 ML/MIN/{1.73_M2}
GLUCOSE BLDC GLUCOMTR-MCNC: 124 MG/DL (ref 70–99)
GLUCOSE BLDC GLUCOMTR-MCNC: 136 MG/DL (ref 70–99)
GLUCOSE BLDC GLUCOMTR-MCNC: 156 MG/DL (ref 70–99)
GLUCOSE BLDC GLUCOMTR-MCNC: 168 MG/DL (ref 70–99)
GLUCOSE BLDC GLUCOMTR-MCNC: 175 MG/DL (ref 70–99)
GLUCOSE BLDC GLUCOMTR-MCNC: 188 MG/DL (ref 70–99)
GLUCOSE SERPL-MCNC: 186 MG/DL (ref 70–99)
HCO3 BLD-SCNC: 27 MMOL/L (ref 21–28)
HCT VFR BLD AUTO: 35 % (ref 40–53)
HGB BLD-MCNC: 11.7 G/DL (ref 13.3–17.7)
MAGNESIUM SERPL-MCNC: 2.5 MG/DL (ref 1.6–2.3)
MCH RBC QN AUTO: 32 PG (ref 26.5–33)
MCHC RBC AUTO-ENTMCNC: 33.4 G/DL (ref 31.5–36.5)
MCV RBC AUTO: 96 FL (ref 78–100)
MRSA DNA SPEC QL NAA+PROBE: NEGATIVE
O2/TOTAL GAS SETTING VFR VENT: 40 %
OXYHGB MFR BLD: 97 % (ref 92–100)
PCO2 BLD: 40 MM HG (ref 35–45)
PH BLD: 7.45 PH (ref 7.35–7.45)
PHOSPHATE SERPL-MCNC: 2.1 MG/DL (ref 2.5–4.5)
PLATELET # BLD AUTO: 223 10E9/L (ref 150–450)
PO2 BLD: 96 MM HG (ref 80–105)
POTASSIUM SERPL-SCNC: 3.3 MMOL/L (ref 3.4–5.3)
POTASSIUM SERPL-SCNC: 3.5 MMOL/L (ref 3.4–5.3)
PROCALCITONIN SERPL-MCNC: 0.65 NG/ML
RBC # BLD AUTO: 3.66 10E12/L (ref 4.4–5.9)
SODIUM SERPL-SCNC: 150 MMOL/L (ref 133–144)
SODIUM SERPL-SCNC: 150 MMOL/L (ref 133–144)
SODIUM SERPL-SCNC: 152 MMOL/L (ref 133–144)
SPECIMEN SOURCE: NORMAL
WBC # BLD AUTO: 16.3 10E9/L (ref 4–11)

## 2021-05-20 PROCEDURE — 99291 CRITICAL CARE FIRST HOUR: CPT | Mod: GC | Performed by: PSYCHIATRY & NEUROLOGY

## 2021-05-20 PROCEDURE — 94640 AIRWAY INHALATION TREATMENT: CPT

## 2021-05-20 PROCEDURE — 250N000013 HC RX MED GY IP 250 OP 250 PS 637: Performed by: STUDENT IN AN ORGANIZED HEALTH CARE EDUCATION/TRAINING PROGRAM

## 2021-05-20 PROCEDURE — 250N000013 HC RX MED GY IP 250 OP 250 PS 637: Performed by: NURSE PRACTITIONER

## 2021-05-20 PROCEDURE — 93886 INTRACRANIAL COMPLETE STUDY: CPT | Mod: 26 | Performed by: RADIOLOGY

## 2021-05-20 PROCEDURE — 87641 MR-STAPH DNA AMP PROBE: CPT | Performed by: STUDENT IN AN ORGANIZED HEALTH CARE EDUCATION/TRAINING PROGRAM

## 2021-05-20 PROCEDURE — 999N000157 HC STATISTIC RCP TIME EA 10 MIN

## 2021-05-20 PROCEDURE — 84295 ASSAY OF SERUM SODIUM: CPT | Performed by: STUDENT IN AN ORGANIZED HEALTH CARE EDUCATION/TRAINING PROGRAM

## 2021-05-20 PROCEDURE — 999N001017 HC STATISTIC GLUCOSE BY METER IP

## 2021-05-20 PROCEDURE — 258N000003 HC RX IP 258 OP 636: Performed by: NURSE PRACTITIONER

## 2021-05-20 PROCEDURE — 80048 BASIC METABOLIC PNL TOTAL CA: CPT | Performed by: STUDENT IN AN ORGANIZED HEALTH CARE EDUCATION/TRAINING PROGRAM

## 2021-05-20 PROCEDURE — 250N000011 HC RX IP 250 OP 636: Performed by: NURSE PRACTITIONER

## 2021-05-20 PROCEDURE — 71045 X-RAY EXAM CHEST 1 VIEW: CPT

## 2021-05-20 PROCEDURE — 250N000009 HC RX 250: Performed by: STUDENT IN AN ORGANIZED HEALTH CARE EDUCATION/TRAINING PROGRAM

## 2021-05-20 PROCEDURE — 250N000013 HC RX MED GY IP 250 OP 250 PS 637: Performed by: NEUROLOGICAL SURGERY

## 2021-05-20 PROCEDURE — 97530 THERAPEUTIC ACTIVITIES: CPT | Mod: GP

## 2021-05-20 PROCEDURE — 71045 X-RAY EXAM CHEST 1 VIEW: CPT | Mod: 26 | Performed by: RADIOLOGY

## 2021-05-20 PROCEDURE — 999N000076 HC STATISTIC ICP MONITORING

## 2021-05-20 PROCEDURE — 85027 COMPLETE CBC AUTOMATED: CPT | Performed by: STUDENT IN AN ORGANIZED HEALTH CARE EDUCATION/TRAINING PROGRAM

## 2021-05-20 PROCEDURE — 84100 ASSAY OF PHOSPHORUS: CPT | Performed by: STUDENT IN AN ORGANIZED HEALTH CARE EDUCATION/TRAINING PROGRAM

## 2021-05-20 PROCEDURE — 84145 PROCALCITONIN (PCT): CPT | Performed by: STUDENT IN AN ORGANIZED HEALTH CARE EDUCATION/TRAINING PROGRAM

## 2021-05-20 PROCEDURE — 250N000009 HC RX 250: Performed by: NURSE PRACTITIONER

## 2021-05-20 PROCEDURE — 83735 ASSAY OF MAGNESIUM: CPT | Performed by: STUDENT IN AN ORGANIZED HEALTH CARE EDUCATION/TRAINING PROGRAM

## 2021-05-20 PROCEDURE — 93886 INTRACRANIAL COMPLETE STUDY: CPT

## 2021-05-20 PROCEDURE — 200N000002 HC R&B ICU UMMC

## 2021-05-20 PROCEDURE — 250N000011 HC RX IP 250 OP 636: Performed by: STUDENT IN AN ORGANIZED HEALTH CARE EDUCATION/TRAINING PROGRAM

## 2021-05-20 PROCEDURE — 94003 VENT MGMT INPAT SUBQ DAY: CPT

## 2021-05-20 PROCEDURE — 999N000065 XR CHEST PORT 1 VIEW

## 2021-05-20 PROCEDURE — 94640 AIRWAY INHALATION TREATMENT: CPT | Mod: 76

## 2021-05-20 PROCEDURE — 87640 STAPH A DNA AMP PROBE: CPT | Performed by: STUDENT IN AN ORGANIZED HEALTH CARE EDUCATION/TRAINING PROGRAM

## 2021-05-20 PROCEDURE — 999N000015 HC STATISTIC ARTERIAL MONITORING DAILY

## 2021-05-20 PROCEDURE — 84132 ASSAY OF SERUM POTASSIUM: CPT | Performed by: NEUROLOGICAL SURGERY

## 2021-05-20 PROCEDURE — 82805 BLOOD GASES W/O2 SATURATION: CPT | Performed by: NURSE PRACTITIONER

## 2021-05-20 RX ORDER — POTASSIUM CHLORIDE 1.5 G/1.58G
20 POWDER, FOR SOLUTION ORAL ONCE
Status: COMPLETED | OUTPATIENT
Start: 2021-05-20 | End: 2021-05-20

## 2021-05-20 RX ORDER — DEXTROSE MONOHYDRATE 25 G/50ML
25-50 INJECTION, SOLUTION INTRAVENOUS
Status: DISCONTINUED | OUTPATIENT
Start: 2021-05-20 | End: 2021-06-01

## 2021-05-20 RX ORDER — NICOTINE POLACRILEX 4 MG
15-30 LOZENGE BUCCAL
Status: DISCONTINUED | OUTPATIENT
Start: 2021-05-20 | End: 2021-06-01

## 2021-05-20 RX ORDER — DEXMEDETOMIDINE HYDROCHLORIDE 4 UG/ML
.2-1.2 INJECTION, SOLUTION INTRAVENOUS CONTINUOUS
Status: DISCONTINUED | OUTPATIENT
Start: 2021-05-20 | End: 2021-05-27

## 2021-05-20 RX ORDER — AMPICILLIN AND SULBACTAM 2; 1 G/1; G/1
3 INJECTION, POWDER, FOR SOLUTION INTRAMUSCULAR; INTRAVENOUS EVERY 6 HOURS
Status: DISCONTINUED | OUTPATIENT
Start: 2021-05-20 | End: 2021-05-21

## 2021-05-20 RX ORDER — SODIUM CHLORIDE FOR INHALATION 3 %
3 VIAL, NEBULIZER (ML) INHALATION EVERY 6 HOURS PRN
Status: DISCONTINUED | OUTPATIENT
Start: 2021-05-20 | End: 2021-05-26

## 2021-05-20 RX ORDER — POTASSIUM CHLORIDE 1.5 G/1.58G
40 POWDER, FOR SOLUTION ORAL ONCE
Status: COMPLETED | OUTPATIENT
Start: 2021-05-20 | End: 2021-05-20

## 2021-05-20 RX ORDER — FENTANYL CITRATE 50 UG/ML
25-50 INJECTION, SOLUTION INTRAMUSCULAR; INTRAVENOUS
Status: DISCONTINUED | OUTPATIENT
Start: 2021-05-20 | End: 2021-05-30

## 2021-05-20 RX ORDER — OXYCODONE HYDROCHLORIDE 5 MG/1
5-10 TABLET ORAL
Status: DISCONTINUED | OUTPATIENT
Start: 2021-05-20 | End: 2021-05-21

## 2021-05-20 RX ADMIN — POTASSIUM & SODIUM PHOSPHATES POWDER PACK 280-160-250 MG 1 PACKET: 280-160-250 PACK at 08:28

## 2021-05-20 RX ADMIN — OXYCODONE HYDROCHLORIDE 10 MG: 5 TABLET ORAL at 22:24

## 2021-05-20 RX ADMIN — INSULIN ASPART 2 UNITS: 100 INJECTION, SOLUTION INTRAVENOUS; SUBCUTANEOUS at 11:58

## 2021-05-20 RX ADMIN — SODIUM CHLORIDE, SODIUM GLUCONATE, SODIUM ACETATE, POTASSIUM CHLORIDE AND MAGNESIUM CHLORIDE 100 ML/HR: 526; 502; 368; 37; 30 INJECTION, SOLUTION INTRAVENOUS at 08:27

## 2021-05-20 RX ADMIN — NIMODIPINE 60 MG: 30 SOLUTION ORAL at 03:40

## 2021-05-20 RX ADMIN — HYDRALAZINE HYDROCHLORIDE 20 MG: 20 INJECTION INTRAMUSCULAR; INTRAVENOUS at 08:23

## 2021-05-20 RX ADMIN — Medication 10 ML: at 23:12

## 2021-05-20 RX ADMIN — NIMODIPINE 60 MG: 30 SOLUTION ORAL at 11:48

## 2021-05-20 RX ADMIN — Medication 10 ML: at 11:48

## 2021-05-20 RX ADMIN — INSULIN ASPART 1 UNITS: 100 INJECTION, SOLUTION INTRAVENOUS; SUBCUTANEOUS at 03:58

## 2021-05-20 RX ADMIN — Medication 10 ML: at 03:40

## 2021-05-20 RX ADMIN — PROPOFOL 20 MCG/KG/MIN: 10 INJECTION, EMULSION INTRAVENOUS at 03:39

## 2021-05-20 RX ADMIN — FENTANYL CITRATE 50 MCG: 50 INJECTION, SOLUTION INTRAMUSCULAR; INTRAVENOUS at 13:47

## 2021-05-20 RX ADMIN — AMPICILLIN SODIUM AND SULBACTAM SODIUM 3 G: 2; 1 INJECTION, POWDER, FOR SOLUTION INTRAMUSCULAR; INTRAVENOUS at 16:52

## 2021-05-20 RX ADMIN — HEPARIN SODIUM 5000 UNITS: 5000 INJECTION, SOLUTION INTRAVENOUS; SUBCUTANEOUS at 15:41

## 2021-05-20 RX ADMIN — POTASSIUM & SODIUM PHOSPHATES POWDER PACK 280-160-250 MG 2 PACKET: 280-160-250 PACK at 11:50

## 2021-05-20 RX ADMIN — HEPARIN SODIUM 5000 UNITS: 5000 INJECTION, SOLUTION INTRAVENOUS; SUBCUTANEOUS at 23:13

## 2021-05-20 RX ADMIN — SODIUM CHLORIDE, SODIUM GLUCONATE, SODIUM ACETATE, POTASSIUM CHLORIDE AND MAGNESIUM CHLORIDE 100 ML/HR: 526; 502; 368; 37; 30 INJECTION, SOLUTION INTRAVENOUS at 18:28

## 2021-05-20 RX ADMIN — SODIUM CHLORIDE SOLN NEBU 3% 3 ML: 3 NEBU SOLN at 09:15

## 2021-05-20 RX ADMIN — Medication 10 ML: at 15:47

## 2021-05-20 RX ADMIN — AMPICILLIN SODIUM AND SULBACTAM SODIUM 3 G: 2; 1 INJECTION, POWDER, FOR SOLUTION INTRAMUSCULAR; INTRAVENOUS at 11:44

## 2021-05-20 RX ADMIN — NIMODIPINE 60 MG: 30 SOLUTION ORAL at 08:33

## 2021-05-20 RX ADMIN — NIMODIPINE 60 MG: 30 SOLUTION ORAL at 19:34

## 2021-05-20 RX ADMIN — FAMOTIDINE 20 MG: 20 TABLET ORAL at 19:33

## 2021-05-20 RX ADMIN — HEPARIN SODIUM 5000 UNITS: 5000 INJECTION, SOLUTION INTRAVENOUS; SUBCUTANEOUS at 08:38

## 2021-05-20 RX ADMIN — Medication 10 ML: at 19:34

## 2021-05-20 RX ADMIN — ACETAMINOPHEN 975 MG: 325 TABLET, FILM COATED ORAL at 23:13

## 2021-05-20 RX ADMIN — POTASSIUM CHLORIDE 20 MEQ: 1.5 POWDER, FOR SOLUTION ORAL at 11:50

## 2021-05-20 RX ADMIN — NIMODIPINE 60 MG: 30 SOLUTION ORAL at 15:46

## 2021-05-20 RX ADMIN — NIMODIPINE 60 MG: 30 SOLUTION ORAL at 23:12

## 2021-05-20 RX ADMIN — DEXMEDETOMIDINE 0.4 MCG/KG/HR: 100 INJECTION, SOLUTION, CONCENTRATE INTRAVENOUS at 11:36

## 2021-05-20 RX ADMIN — DEXMEDETOMIDINE 0.7 MCG/KG/HR: 100 INJECTION, SOLUTION, CONCENTRATE INTRAVENOUS at 19:33

## 2021-05-20 RX ADMIN — IPRATROPIUM BROMIDE AND ALBUTEROL SULFATE 3 ML: .5; 3 SOLUTION RESPIRATORY (INHALATION) at 09:15

## 2021-05-20 RX ADMIN — Medication 10 ML: at 08:33

## 2021-05-20 RX ADMIN — ACETAMINOPHEN 975 MG: 325 TABLET, FILM COATED ORAL at 08:28

## 2021-05-20 RX ADMIN — AMPICILLIN SODIUM AND SULBACTAM SODIUM 3 G: 2; 1 INJECTION, POWDER, FOR SOLUTION INTRAMUSCULAR; INTRAVENOUS at 23:13

## 2021-05-20 RX ADMIN — INSULIN ASPART 1 UNITS: 100 INJECTION, SOLUTION INTRAVENOUS; SUBCUTANEOUS at 15:52

## 2021-05-20 RX ADMIN — POTASSIUM & SODIUM PHOSPHATES POWDER PACK 280-160-250 MG 2 PACKET: 280-160-250 PACK at 15:42

## 2021-05-20 RX ADMIN — ACETAMINOPHEN 975 MG: 325 TABLET, FILM COATED ORAL at 15:42

## 2021-05-20 RX ADMIN — POTASSIUM CHLORIDE 40 MEQ: 1.5 POWDER, FOR SOLUTION ORAL at 05:46

## 2021-05-20 RX ADMIN — FAMOTIDINE 20 MG: 20 TABLET ORAL at 08:28

## 2021-05-20 RX ADMIN — INSULIN ASPART 2 UNITS: 100 INJECTION, SOLUTION INTRAVENOUS; SUBCUTANEOUS at 23:21

## 2021-05-20 RX ADMIN — IPRATROPIUM BROMIDE AND ALBUTEROL SULFATE 3 ML: .5; 3 SOLUTION RESPIRATORY (INHALATION) at 14:08

## 2021-05-20 RX ADMIN — FENTANYL CITRATE 50 MCG: 50 INJECTION, SOLUTION INTRAMUSCULAR; INTRAVENOUS at 22:24

## 2021-05-20 RX ADMIN — IPRATROPIUM BROMIDE AND ALBUTEROL SULFATE 3 ML: .5; 3 SOLUTION RESPIRATORY (INHALATION) at 19:44

## 2021-05-20 RX ADMIN — OXYCODONE HYDROCHLORIDE 10 MG: 5 TABLET ORAL at 11:50

## 2021-05-20 RX ADMIN — Medication 15 ML: at 08:28

## 2021-05-20 RX ADMIN — OXYCODONE HYDROCHLORIDE 10 MG: 5 TABLET ORAL at 15:42

## 2021-05-20 ASSESSMENT — ACTIVITIES OF DAILY LIVING (ADL)
ADLS_ACUITY_SCORE: 22
ADLS_ACUITY_SCORE: 22
ADLS_ACUITY_SCORE: 21
ADLS_ACUITY_SCORE: 22

## 2021-05-20 ASSESSMENT — MIFFLIN-ST. JEOR: SCORE: 1493.13

## 2021-05-20 NOTE — PROGRESS NOTES
Neuroscience Intensive Care Progress Note    Problem List  1.  Aneurysmal subarachnoid hemorrhage, HH 5, MF 4, bleed day 5/15  2.  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  Hydrocephalus status post EVD 5/15  4.  Hypertension  5.  Acute hypoxic respiratory failure  6.  Hypernatremia  7.  Hyperchloremia  8.  Leukocytosis  9.  Normocytic anemia    Intake/Output Summary (Last 24 hours) at 5/19/2021 0711  Last data filed at 5/19/2021 0700  Gross per 24 hour   Intake 3097.92 ml   Output 1903 ml   Net 1194.92 ml           Arterial Line BP: ()/(57-94) 196/88  MAP:  [68 mmHg-136 mmHg] 119 mmHg    Current Medications:    acetaminophen  975 mg Oral or Feeding Tube Q8H MARAH     famotidine  20 mg Oral or Feeding Tube BID     heparin ANTICOAGULANT  5,000 Units Subcutaneous Q8H     insulin aspart  1-6 Units Subcutaneous Q4H     ipratropium - albuterol 0.5 mg/2.5 mg/3 mL  3 mL Nebulization Q6H     multivitamins w/minerals  15 mL Per Feeding Tube Daily     niMODipine  60 mg Oral or Feeding Tube Q4H MARAH    And     sodium chloride 0.9 %  10 mL Oral or Feeding Tube Q4H MARAH     polyethylene glycol  17 g Oral or Feeding Tube Daily     potassium & sodium phosphates  1 packet Oral or Feeding Tube TID     senna-docusate  1 tablet Oral or Feeding Tube BID    Or     senna-docusate  2 tablet Oral or Feeding Tube BID     sodium chloride  3 mL Nebulization Q6H       PRN Medications:  bisacodyl, glucose **OR** dextrose **OR** glucagon, hydrALAZINE, labetalol, naloxone **OR** naloxone **OR** naloxone **OR** naloxone, ondansetron **OR** ondansetron, prochlorperazine **OR** prochlorperazine **OR** prochlorperazine    Infusions:    fentaNYL 25 mcg/hr (05/20/21 0700)     lactated ringers 10 mL/hr at 05/20/21 0700     Plasma-Lyte A 100 mL/hr (05/20/21 0700)     propofol (DIPRIVAN) infusion 20 mcg/kg/min (05/20/21 0700)       No Known Allergies    Physical Examination:  BP (!) 237/103   Pulse 87   Temp 99.2  F (37.3  C)  "(Axillary)   Resp 16   Ht 1.803 m (5' 11\")   Wt 70.1 kg (154 lb 8.7 oz)   SpO2 95%   BMI 21.55 kg/m      EXAM:  Examined off sedation. Mr Neri is examined in the supine position. He is intubated. He opens his eyes to noxious stimuli.  He follows simple commands to wiggle toes. Responds \"yes\" to loud voice asking if he can hear me. Tracks but not briskly. Pupils are symmetric and react to light.  No facial asymmetry.  His arms moves purporsefully when agitated with purpose with antigravity strength.  His legs move briskly purposefully. Sensation appears grossly intact by grimace to noxious stimulus.  On Room air. Lung sounds clear.  Regular cardiac rhythm.  Soft abdomen with bowel sounds.  Groin site clean without evidence of hematoma.  No peripheral edema.    Labs/Studies:  Recent Labs   Lab Test 05/16/21  0405 05/15/21  2125 05/15/21  1526 05/15/21  1311 05/15/21  1222   * 146* 144 139 131*   POTASSIUM 3.5 3.8 3.7 3.2* 4.1   CHLORIDE 115* 113*  --  109 103   CO2 27 27  --  24 22   ANIONGAP 6 5  --  6 6   * 142* 125* 156* 215*   BUN 16 16  --  14 15   CR 0.88 0.99  --  0.78 0.78   RAGINI 8.9 8.8  --  7.4* 8.3*   WBC 15.0* 12.3*  --   --  16.5*   RBC 3.74* 3.85*  --   --  4.43   HGB 11.7* 11.8* 13.1*  --  13.6    239  --   --  257       Recent Labs   Lab Test 05/15/21  2125 05/15/21  1222 05/15/21  0930   INR 1.20* 1.16* 1.07   PTT 30 29 27       Recent Labs   Lab 05/20/21  0359 05/19/21  1612 05/19/21  1003 05/19/21  0705   PH 7.45 7.46* 7.43 7.55*   PCO2 40 35 42 29*   PO2 96 70* 65* 57*   HCO3 27 25 28 25   O2PER 40.0 50 60 2l       Imaging and labs reviewed personally in the EMR    Assessment:  Mr Neri is a 70-year-old gentleman currently admitted for management of aneurysmal subarachnoid hemorrhage.  He is now post bleed day 5, postop day 5 for right P-comm aneurysm clipping. Continues to do well. Required intubation this morning. But continues to follow commands and wiggle toes. Will " continue to monitor. Plan for close monitoring for neurologic changes associated with vasospasm (beginning of vasospasm window currently) and/or worsening hydrocephalus.      Plan:        Yesterday:  - CTA/CTP given RF subacute infarcts and increased TCDs in R BAYLEE  - intubated this AM, repeat ABG looks improved; Will obtain repeat ABG and CXR tomorrow.  - UA/Ucx, Bcx, sputum cx for febrile to 38.3; procal for tmr AM  - Plasmalyte at 100/hr for hypovolemia; LR 500cc Bolus   - Begin famotidine for GI ppx  - Stop oxycodone; seroquel  - Start fentanyl drip; wean propofol as able  - FWF to 100q2; Sodium q12;  - Begin ISS medium resistance    Overnight:   NAEO      Today:  - propofol off; fentanyl off; begin precedex  - PEEP to 8 from 10  - Begin unasyn for aspiration pneumonia (GNRs in sputum)    Neuro:   #Aneurysmal subarachnoid hemorrhage, HH 5, MF 4, bleed day 5/15  #Ruptured right P-comm aneurysm status post clipping 5/15  #IVH with subsequent hydrocephalus status post LF EVD 5/15  -- fentanyl 25mcg/hr; propofol 20/hr  -- CTA/CTP - with diminished CBV and CBF overlying subacute infarct. CT head yesterday RF subacute infarct, read report says correponds to R BAYLEE spasm, although moving LLE briskly today.   -- LF EVD @ 0 (moved from 10 last night); ICPs 4-10, one time 14. 264 (263 yesterday), 77 since midnight; CSF color appears to be clearing  -Hourly neurochecks  -Head of bed elevated  -Nimodipine 60q4;   -- Keppra stopped; aneurysm secured  -Normothermia, Euglycemia  -Normonatremia; allowing hypernatremia (Repeat Na 151 today)  -Daily TCD; yesterday WNL w/increased R BAYLEE to 93; TCDs normalized today  -Baseline CTA/CTP done    #agitation  - OK for fentanyl bumps prn     - delirium precautions    #pain management  - fentanyl gtt off; propofol off  - precedex for target RASS 0 to -1; wean as able    Resp:   #Acute hypoxic respiratory failure  -- ABG this AM: 7.45/40/96/27/3.1    -- significantly improved this AM  -- CXR  AM: Stable to improved; RLL decreased opacities compared to yesterday  -- Continue duo nebs q6h  -- Saline 3% nebulizer daily PRN  -- Chest Physiotherapy with bag suctioning   -- Continue pressure support trials as able    #Intubated  - CMV-AC; wean FiO2 before weaning PEEP  Ventilation Mode: CMV/AC  (Continuous Mandatory Ventilation/ Assist Control)  FiO2 (%): 40 %  Rate Set (breaths/minute): 14 breaths/min  Tidal Volume Set (mL): 500 mL  PEEP (cm H2O): 10 cmH2O  Oxygen Concentration (%): 40 %  Resp: 16        CV:   #Hypertension, relative; PTA lisinopril  # SBP 130s-160s one time 190s; HR 70s-80s  #Normal EF  -SBP less than 200  -Nicardipine drip running this morning now off    Renal:   #Hypernatremia likely 2/2 volume depletion  #Hyperchloremia  -Goal normonatremia; currenlty Na - 151 from 150; with concentrated urine, -2.2L since admission, +900mL yesterday; Will continue to monitor  - Continue Plasma-Lyte 100/hr  -- FWF 100q2  - Q12 sodium  -Monitor BMP  - I/Os: +1L yesterday; +1L since midnight; overall approaching net zero since admission    Endo: No active issues.  Monitor glucose.  Hypoglycemia protocol.   - glucose 188 this AM;   - Will start high resistance ISS    Heme:   #Leukocytosis  #Normocytic anemia  -Monitor CBC and vital signs    GI:   #nutrition  #NGT in place  - TF @ 60 @ goal  -Nutrition services consulted  -Free water flush (100q2)  --Adding bowel regimen    ID:     #aspiration pneumonitis vs pneumonia; with worsening oxygenation  #yesterday 1x fever to 38.3  #GNR in sputum  - WBC increased to 16.3 from 14.3  - Begin Unasyn 3g q6; will continue to follow sensitivities  - Tmax 99.2  - Remains afebrile since yesterday  - pancultured - Bcx, NGTD sputum gm stain mixed gm-/+ he now growing GNR  - UA negative;   - Procal 0.65 this AM from 0.13      PPX:     DVT: SQH [id number]    GI:  famotidine 20mg BID     Lines and Tubes:  -Right radial arterial line 5/15  -Left frontal EVD 5/15  -Left subclavian  triple-lumen catheter 5/15  -Indwelling urinary catheter 5/18  -Nasogastric tube 5/16; repositioned on 5/17    Code Status: Full code    Dispo: 4A ICU. He will require ICU care for at least 1 to 2 weeks.    Patient seen and discussed with     David Sands MD  Neuro ICU Resident PGY-1    05/20/2021

## 2021-05-20 NOTE — PROGRESS NOTES
Neurosurgery progress note    S: no acute events overnight    O:  Temp:  [99.5  F (37.5  C)-101.5  F (38.6  C)] 100.6  F (38.1  C)  Pulse:  [57-80] 62  Resp:  [12-23] 15  BP: (142)/(78) 142/78  MAP:  [66 mmHg-126 mmHg] 126 mmHg  Arterial Line BP: (108-162)/() 155/112  FiO2 (%):  [40 %] 40 %  SpO2:  [90 %-99 %] 96 %    Exam:  Incision: c/d/i, drain and EVD in place  Localizes LUE, trace movement RUE  W/d b/l LE      ASSESSMENT: 70 year old man presented with AMS found to have aSAH with right supraclinoid aneurysm. mFS4 and HH5. GCS 5. S/p EVD placement,s/p aneurysm clipping on 5/16.     The following conditions are also present, complicating the patient's current management, as described in the Assessment and Plan:   mechanical ventilation on day of admission, intracerebral hematoma, brain compression, acute respiratory failure, coma, based on current GCS of 5 and cerebral aneurysm rupture with hunt sims scale 5 modified lowery 4     Hypokalemia   Severe malnutrition in the context of acute on chronic illness  Respiratory failure  Pneumonia     RECOMMENDATIONS:  - EVD @ 0  - CT head tmr AM  - CTA/CTP (completed)  - Q1hr neuro exam  - SBP<180  - HOB > 30 degrees  - Unasyn for PNA  - Continuous cardiac monitoring while in ICU   - Nimodipine  - Glucose < 180  - Continue glucose checks  - Platelets > 100,000  - INR < 1.5  - Hemoglobin > 8  - DVT: SCDs while in bed  - Disposition: 4A  - PT/OT consulted        Samuel Nolasco MD, PhD  Neurosurgery Resident PGY-2    Please contact neurosurgery resident on call with questions.    Dial * * [phone], enter [phone] when prompted.     I have reviewed the history. I have seen and examined the patient myself and agree with the assessment and plan above.  RAYNA Maurer MD

## 2021-05-20 NOTE — PLAN OF CARE
Major Shift Events: Sedated; KWAME orientation; x follow commands, withdraws in all 4 extremities; PERRL; VSS; Tele NSR; EVD 0 above with ICP's 4 to 13 and EVD output 5 to 25 (MD aware on 25) per hour, serosang; BS+, passing gas, having loose BM's overnight; LS clear/dim, on CMV 40% FiO2; fournier having adequate urine output; propofol @ 20, Fentanyl @ 25; MIVF @ 100 and 10; blood sugars replace per MAR; wife updated      Plan: continue to monitor neuro/EVD status    For vital signs and complete assessments, please see documentation flowsheets.      Anthony Garcia RN, BSN

## 2021-05-21 ENCOUNTER — APPOINTMENT (OUTPATIENT)
Dept: ULTRASOUND IMAGING | Facility: CLINIC | Age: 71
DRG: 003 | End: 2021-05-21
Attending: STUDENT IN AN ORGANIZED HEALTH CARE EDUCATION/TRAINING PROGRAM
Payer: COMMERCIAL

## 2021-05-21 LAB
ABO + RH BLD: NORMAL
ABO + RH BLD: NORMAL
ANION GAP SERPL CALCULATED.3IONS-SCNC: 3 MMOL/L (ref 3–14)
BLD GP AB SCN SERPL QL: NORMAL
BLOOD BANK CMNT PATIENT-IMP: NORMAL
BUN SERPL-MCNC: 26 MG/DL (ref 7–30)
CALCIUM SERPL-MCNC: 8.2 MG/DL (ref 8.5–10.1)
CHLORIDE SERPL-SCNC: 120 MMOL/L (ref 94–109)
CO2 SERPL-SCNC: 29 MMOL/L (ref 20–32)
CREAT SERPL-MCNC: 0.67 MG/DL (ref 0.66–1.25)
ERYTHROCYTE [DISTWIDTH] IN BLOOD BY AUTOMATED COUNT: 13.1 % (ref 10–15)
GFR SERPL CREATININE-BSD FRML MDRD: >90 ML/MIN/{1.73_M2}
GLUCOSE BLDC GLUCOMTR-MCNC: 130 MG/DL (ref 70–99)
GLUCOSE BLDC GLUCOMTR-MCNC: 135 MG/DL (ref 70–99)
GLUCOSE BLDC GLUCOMTR-MCNC: 150 MG/DL (ref 70–99)
GLUCOSE BLDC GLUCOMTR-MCNC: 153 MG/DL (ref 70–99)
GLUCOSE BLDC GLUCOMTR-MCNC: 172 MG/DL (ref 70–99)
GLUCOSE SERPL-MCNC: 124 MG/DL (ref 70–99)
HCT VFR BLD AUTO: 29.7 % (ref 40–53)
HGB BLD-MCNC: 9.7 G/DL (ref 13.3–17.7)
MAGNESIUM SERPL-MCNC: 2.2 MG/DL (ref 1.6–2.3)
MCH RBC QN AUTO: 31.6 PG (ref 26.5–33)
MCHC RBC AUTO-ENTMCNC: 32.7 G/DL (ref 31.5–36.5)
MCV RBC AUTO: 97 FL (ref 78–100)
PHOSPHATE SERPL-MCNC: 2.2 MG/DL (ref 2.5–4.5)
PLATELET # BLD AUTO: 206 10E9/L (ref 150–450)
POTASSIUM SERPL-SCNC: 3.1 MMOL/L (ref 3.4–5.3)
POTASSIUM SERPL-SCNC: 3.3 MMOL/L (ref 3.4–5.3)
POTASSIUM SERPL-SCNC: 3.5 MMOL/L (ref 3.4–5.3)
PROCALCITONIN SERPL-MCNC: 0.48 NG/ML
RBC # BLD AUTO: 3.07 10E12/L (ref 4.4–5.9)
SODIUM SERPL-SCNC: 149 MMOL/L (ref 133–144)
SODIUM SERPL-SCNC: 151 MMOL/L (ref 133–144)
SODIUM SERPL-SCNC: 151 MMOL/L (ref 133–144)
SPECIMEN EXP DATE BLD: NORMAL
WBC # BLD AUTO: 14.2 10E9/L (ref 4–11)

## 2021-05-21 PROCEDURE — 250N000013 HC RX MED GY IP 250 OP 250 PS 637: Performed by: STUDENT IN AN ORGANIZED HEALTH CARE EDUCATION/TRAINING PROGRAM

## 2021-05-21 PROCEDURE — 86901 BLOOD TYPING SEROLOGIC RH(D): CPT | Performed by: STUDENT IN AN ORGANIZED HEALTH CARE EDUCATION/TRAINING PROGRAM

## 2021-05-21 PROCEDURE — 84132 ASSAY OF SERUM POTASSIUM: CPT | Performed by: STUDENT IN AN ORGANIZED HEALTH CARE EDUCATION/TRAINING PROGRAM

## 2021-05-21 PROCEDURE — 84145 PROCALCITONIN (PCT): CPT | Performed by: STUDENT IN AN ORGANIZED HEALTH CARE EDUCATION/TRAINING PROGRAM

## 2021-05-21 PROCEDURE — 258N000003 HC RX IP 258 OP 636: Performed by: NURSE PRACTITIONER

## 2021-05-21 PROCEDURE — 250N000013 HC RX MED GY IP 250 OP 250 PS 637: Performed by: NURSE PRACTITIONER

## 2021-05-21 PROCEDURE — 200N000002 HC R&B ICU UMMC

## 2021-05-21 PROCEDURE — 83735 ASSAY OF MAGNESIUM: CPT | Performed by: STUDENT IN AN ORGANIZED HEALTH CARE EDUCATION/TRAINING PROGRAM

## 2021-05-21 PROCEDURE — 999N000157 HC STATISTIC RCP TIME EA 10 MIN

## 2021-05-21 PROCEDURE — 85027 COMPLETE CBC AUTOMATED: CPT | Performed by: STUDENT IN AN ORGANIZED HEALTH CARE EDUCATION/TRAINING PROGRAM

## 2021-05-21 PROCEDURE — 86850 RBC ANTIBODY SCREEN: CPT | Performed by: STUDENT IN AN ORGANIZED HEALTH CARE EDUCATION/TRAINING PROGRAM

## 2021-05-21 PROCEDURE — 250N000011 HC RX IP 250 OP 636: Performed by: NURSE PRACTITIONER

## 2021-05-21 PROCEDURE — 250N000013 HC RX MED GY IP 250 OP 250 PS 637: Performed by: NEUROLOGICAL SURGERY

## 2021-05-21 PROCEDURE — 86900 BLOOD TYPING SEROLOGIC ABO: CPT | Performed by: STUDENT IN AN ORGANIZED HEALTH CARE EDUCATION/TRAINING PROGRAM

## 2021-05-21 PROCEDURE — 99291 CRITICAL CARE FIRST HOUR: CPT | Mod: GC | Performed by: PSYCHIATRY & NEUROLOGY

## 2021-05-21 PROCEDURE — 999N001017 HC STATISTIC GLUCOSE BY METER IP

## 2021-05-21 PROCEDURE — 250N000009 HC RX 250: Performed by: STUDENT IN AN ORGANIZED HEALTH CARE EDUCATION/TRAINING PROGRAM

## 2021-05-21 PROCEDURE — 93886 INTRACRANIAL COMPLETE STUDY: CPT | Mod: 26 | Performed by: RADIOLOGY

## 2021-05-21 PROCEDURE — 93886 INTRACRANIAL COMPLETE STUDY: CPT

## 2021-05-21 PROCEDURE — 94003 VENT MGMT INPAT SUBQ DAY: CPT

## 2021-05-21 PROCEDURE — 84100 ASSAY OF PHOSPHORUS: CPT | Performed by: STUDENT IN AN ORGANIZED HEALTH CARE EDUCATION/TRAINING PROGRAM

## 2021-05-21 PROCEDURE — 250N000011 HC RX IP 250 OP 636: Performed by: STUDENT IN AN ORGANIZED HEALTH CARE EDUCATION/TRAINING PROGRAM

## 2021-05-21 PROCEDURE — 80048 BASIC METABOLIC PNL TOTAL CA: CPT | Performed by: STUDENT IN AN ORGANIZED HEALTH CARE EDUCATION/TRAINING PROGRAM

## 2021-05-21 PROCEDURE — 94640 AIRWAY INHALATION TREATMENT: CPT | Mod: 76

## 2021-05-21 PROCEDURE — 250N000009 HC RX 250: Performed by: NURSE PRACTITIONER

## 2021-05-21 PROCEDURE — 84295 ASSAY OF SERUM SODIUM: CPT | Performed by: STUDENT IN AN ORGANIZED HEALTH CARE EDUCATION/TRAINING PROGRAM

## 2021-05-21 PROCEDURE — 999N000015 HC STATISTIC ARTERIAL MONITORING DAILY

## 2021-05-21 RX ORDER — POTASSIUM CHLORIDE 1.5 G/1.58G
20 POWDER, FOR SOLUTION ORAL ONCE
Status: COMPLETED | OUTPATIENT
Start: 2021-05-21 | End: 2021-05-21

## 2021-05-21 RX ORDER — POTASSIUM CHLORIDE 1.5 G/1.58G
40 POWDER, FOR SOLUTION ORAL ONCE
Status: COMPLETED | OUTPATIENT
Start: 2021-05-21 | End: 2021-05-21

## 2021-05-21 RX ORDER — OXYCODONE HYDROCHLORIDE 5 MG/1
5-10 TABLET ORAL EVERY 4 HOURS PRN
Status: DISCONTINUED | OUTPATIENT
Start: 2021-05-21 | End: 2021-05-25

## 2021-05-21 RX ORDER — CEFTRIAXONE 2 G/1
2 INJECTION, POWDER, FOR SOLUTION INTRAMUSCULAR; INTRAVENOUS EVERY 24 HOURS
Status: DISCONTINUED | OUTPATIENT
Start: 2021-05-21 | End: 2021-05-23

## 2021-05-21 RX ORDER — AMINO AC/PROTEIN HYDR/WHEY PRO 10G-100/30
1 LIQUID (ML) ORAL 3 TIMES DAILY
Status: DISCONTINUED | OUTPATIENT
Start: 2021-05-21 | End: 2021-06-11 | Stop reason: HOSPADM

## 2021-05-21 RX ADMIN — Medication 10 ML: at 11:59

## 2021-05-21 RX ADMIN — DEXMEDETOMIDINE 0.5 MCG/KG/HR: 100 INJECTION, SOLUTION, CONCENTRATE INTRAVENOUS at 12:20

## 2021-05-21 RX ADMIN — OXYCODONE HYDROCHLORIDE 10 MG: 5 TABLET ORAL at 22:07

## 2021-05-21 RX ADMIN — LABETALOL HYDROCHLORIDE 10 MG: 5 INJECTION, SOLUTION INTRAVENOUS at 23:14

## 2021-05-21 RX ADMIN — IPRATROPIUM BROMIDE AND ALBUTEROL SULFATE 3 ML: .5; 3 SOLUTION RESPIRATORY (INHALATION) at 01:34

## 2021-05-21 RX ADMIN — Medication 10 ML: at 16:26

## 2021-05-21 RX ADMIN — OXYCODONE HYDROCHLORIDE 10 MG: 5 TABLET ORAL at 06:04

## 2021-05-21 RX ADMIN — IPRATROPIUM BROMIDE AND ALBUTEROL SULFATE 3 ML: .5; 3 SOLUTION RESPIRATORY (INHALATION) at 14:02

## 2021-05-21 RX ADMIN — NIMODIPINE 60 MG: 30 SOLUTION ORAL at 16:26

## 2021-05-21 RX ADMIN — POTASSIUM CHLORIDE 40 MEQ: 1.5 POWDER, FOR SOLUTION ORAL at 06:04

## 2021-05-21 RX ADMIN — OXYCODONE HYDROCHLORIDE 5 MG: 5 TABLET ORAL at 15:01

## 2021-05-21 RX ADMIN — HEPARIN SODIUM 5000 UNITS: 5000 INJECTION, SOLUTION INTRAVENOUS; SUBCUTANEOUS at 16:25

## 2021-05-21 RX ADMIN — INSULIN ASPART 2 UNITS: 100 INJECTION, SOLUTION INTRAVENOUS; SUBCUTANEOUS at 20:10

## 2021-05-21 RX ADMIN — DEXMEDETOMIDINE 0.7 MCG/KG/HR: 100 INJECTION, SOLUTION, CONCENTRATE INTRAVENOUS at 23:10

## 2021-05-21 RX ADMIN — INSULIN ASPART 1 UNITS: 100 INJECTION, SOLUTION INTRAVENOUS; SUBCUTANEOUS at 11:59

## 2021-05-21 RX ADMIN — SODIUM CHLORIDE, SODIUM GLUCONATE, SODIUM ACETATE, POTASSIUM CHLORIDE AND MAGNESIUM CHLORIDE 100 ML/HR: 526; 502; 368; 37; 30 INJECTION, SOLUTION INTRAVENOUS at 03:47

## 2021-05-21 RX ADMIN — CEFTRIAXONE SODIUM 2 G: 2 INJECTION, POWDER, FOR SOLUTION INTRAMUSCULAR; INTRAVENOUS at 10:13

## 2021-05-21 RX ADMIN — LABETALOL HYDROCHLORIDE 10 MG: 5 INJECTION, SOLUTION INTRAVENOUS at 16:25

## 2021-05-21 RX ADMIN — POTASSIUM CHLORIDE 20 MEQ: 1.5 POWDER, FOR SOLUTION ORAL at 22:07

## 2021-05-21 RX ADMIN — Medication 12.5 MG: at 20:47

## 2021-05-21 RX ADMIN — Medication 10 ML: at 19:56

## 2021-05-21 RX ADMIN — AMPICILLIN SODIUM AND SULBACTAM SODIUM 3 G: 2; 1 INJECTION, POWDER, FOR SOLUTION INTRAMUSCULAR; INTRAVENOUS at 06:04

## 2021-05-21 RX ADMIN — NIMODIPINE 60 MG: 30 SOLUTION ORAL at 03:37

## 2021-05-21 RX ADMIN — OXYCODONE HYDROCHLORIDE 10 MG: 5 TABLET ORAL at 10:13

## 2021-05-21 RX ADMIN — Medication 15 ML: at 07:43

## 2021-05-21 RX ADMIN — NIMODIPINE 60 MG: 30 SOLUTION ORAL at 19:56

## 2021-05-21 RX ADMIN — IPRATROPIUM BROMIDE AND ALBUTEROL SULFATE 3 ML: .5; 3 SOLUTION RESPIRATORY (INHALATION) at 09:16

## 2021-05-21 RX ADMIN — NIMODIPINE 60 MG: 30 SOLUTION ORAL at 07:46

## 2021-05-21 RX ADMIN — HEPARIN SODIUM 5000 UNITS: 5000 INJECTION, SOLUTION INTRAVENOUS; SUBCUTANEOUS at 07:43

## 2021-05-21 RX ADMIN — Medication 1 PACKET: at 11:58

## 2021-05-21 RX ADMIN — POTASSIUM PHOSPHATE, MONOBASIC 1500 MG: 500 TABLET, SOLUBLE ORAL at 08:14

## 2021-05-21 RX ADMIN — POTASSIUM PHOSPHATE, MONOBASIC 1500 MG: 500 TABLET, SOLUBLE ORAL at 16:25

## 2021-05-21 RX ADMIN — OXYCODONE HYDROCHLORIDE 10 MG: 5 TABLET ORAL at 01:24

## 2021-05-21 RX ADMIN — NIMODIPINE 60 MG: 30 SOLUTION ORAL at 11:59

## 2021-05-21 RX ADMIN — ACETAMINOPHEN 975 MG: 325 TABLET, FILM COATED ORAL at 16:25

## 2021-05-21 RX ADMIN — POTASSIUM PHOSPHATE, MONOBASIC 1500 MG: 500 TABLET, SOLUBLE ORAL at 11:58

## 2021-05-21 RX ADMIN — Medication 1 PACKET: at 19:56

## 2021-05-21 RX ADMIN — IPRATROPIUM BROMIDE AND ALBUTEROL SULFATE 3 ML: .5; 3 SOLUTION RESPIRATORY (INHALATION) at 21:06

## 2021-05-21 RX ADMIN — Medication 1 PACKET: at 14:38

## 2021-05-21 RX ADMIN — DEXMEDETOMIDINE 0.7 MCG/KG/HR: 100 INJECTION, SOLUTION, CONCENTRATE INTRAVENOUS at 03:45

## 2021-05-21 RX ADMIN — POTASSIUM CHLORIDE 40 MEQ: 1.5 POWDER, FOR SOLUTION ORAL at 14:38

## 2021-05-21 RX ADMIN — FENTANYL CITRATE 50 MCG: 50 INJECTION, SOLUTION INTRAMUSCULAR; INTRAVENOUS at 06:04

## 2021-05-21 RX ADMIN — FENTANYL CITRATE 50 MCG: 50 INJECTION, SOLUTION INTRAMUSCULAR; INTRAVENOUS at 01:24

## 2021-05-21 RX ADMIN — SODIUM CHLORIDE, SODIUM GLUCONATE, SODIUM ACETATE, POTASSIUM CHLORIDE AND MAGNESIUM CHLORIDE 50 ML/HR: 526; 502; 368; 37; 30 INJECTION, SOLUTION INTRAVENOUS at 17:14

## 2021-05-21 RX ADMIN — Medication 10 ML: at 03:37

## 2021-05-21 RX ADMIN — INSULIN ASPART 1 UNITS: 100 INJECTION, SOLUTION INTRAVENOUS; SUBCUTANEOUS at 07:46

## 2021-05-21 RX ADMIN — FAMOTIDINE 20 MG: 20 TABLET ORAL at 07:43

## 2021-05-21 RX ADMIN — FAMOTIDINE 20 MG: 20 TABLET ORAL at 19:55

## 2021-05-21 RX ADMIN — Medication 10 ML: at 07:49

## 2021-05-21 RX ADMIN — ACETAMINOPHEN 975 MG: 325 TABLET, FILM COATED ORAL at 07:43

## 2021-05-21 ASSESSMENT — ACTIVITIES OF DAILY LIVING (ADL)
ADLS_ACUITY_SCORE: 22
ADLS_ACUITY_SCORE: 24
ADLS_ACUITY_SCORE: 22
ADLS_ACUITY_SCORE: 22

## 2021-05-21 ASSESSMENT — MIFFLIN-ST. JEOR: SCORE: 1505.13

## 2021-05-21 NOTE — PROGRESS NOTES
CLINICAL NUTRITION SERVICES - REASSESSMENT NOTE     Nutrition Prescription    RECOMMENDATIONS FOR MDs/PROVIDERS TO ORDER:  - Total daily fluids/adjustments per MD   - Electrolyte replacement per protocol as indicated. Enteral phos still ordered.   - Minimize disruptions to EN infusions to optimize nutrition     Malnutrition Status:    - Severe malnutrition in the context of acute on chronic illness    Recommendations already ordered by Registered Dietitian (RD):  - Change EN regimen: Osmolite 1.5 Cristi @ goal of  60ml/hr  (1440ml/day)  will provide: 2160 kcals (31 kcals/kg), 90 g PRO (1.3 gm/kg), 1097 ml free H20, 293 g CHO, and 0 g fiber daily.  - Additional 3 packets Prosource = 123 gm PRO (1.8 gm/kg) and 2240 kcals (32 kcals)  - Initiate NEW regimen @ goal     Future/Additional Recommendations:  - EN tolerance via current access/NGT (Cortrak)  - Weight trends   - Met cart as appro      EVALUATION OF THE PROGRESS TOWARD GOALS   Diet: NPO  Nutrition Support: Pivot 1.5 Cristi @ goal of 60ml/hr (1440ml/day) will provide: 2160 kcals (31 kcal/kg), 135 g PRO (2 g/kg), 1080 ml free H20, 248 g CHO, and 10 g fiber daily.  Intake: 681 ml, 1022 kcals (15 kcals/kg or 49%), 64 gm PRO (0.9 gm/kg or 62%)     NEW FINDINGS   Nutrition/GI: Pt continues on EN via current access/NGT (Cortrak). Observed pump at goal. + BMs since escalation of bowel regimen with multiple stools.     Neuro: Aneurysmal subarachnoid hemorrhage, HH 5, MF 4; Ruptured right P-comm aneurysm status post clipping 5/15; IVH with subsequent hydrocephalus status post LF EVD 5/15    Resp: Pt was intubated 5/20 - PS trials as able.     Renal: Hypernatremia likely 2/2 volume depletion-Goal normonatremia. On Plasma-Lyte 100/hr + FWF 100q2    Skin: Skin beneath nasal bridle was inspected; FT moved midline, some tension to right nare observed. Skin appears crusty with old blood. Otherwise skin appears pale, fragile.     Labs/meds: Na+ 151 (H); K+ 3.1 (L); phos 2.2 (L).  Glucose trends appropriate. Insulin; Certavite; Miralax; Senokot; Neutra-phos; electrolyte replacement    Weights: Overall weight has trended up from admit: 69 kg --> 71.3 kg. Likely r/t fluid status.    MALNUTRITION  % Intake: </= 50% for >/= 5 days (severe) - 49% kcal needs over 5 days   % Weight Loss: Unable to assess h/o; none since admit   Subcutaneous Fat Loss: Facial mild region: moderate and Upper arm: moderate  Muscle Loss: Temporal:  moderate, Thoracic region (clavicle, acromium bone, deltoid, trapezius, pectoral): severe, Upper arm (bicep, tricep):  moderate and Upper leg (quadricep, hamstring): moderate; calf: severe  Fluid Accumulation/Edema: None noted  Malnutrition Diagnosis: Severe malnutrition in the context of acute on chronic illness    Previous Goals   Total avg nutritional intake to meet a minimum of 30 kcal/kg and 1.5 g PRO/kg daily (per dosing wt 69 kg).  Evaluation: Not met    Previous Nutrition Diagnosis  Inadequate oral intake related to dysphagia, altered mentation, and recent extubation as evidenced by NPO status x at least 1 day without the start of enteral nutrition, currently meeting 0% of needs.     Evaluation: Improving    CURRENT NUTRITION DIAGNOSIS  Inadequate protein-energy intake related to disruptions to EN infusions as evidenced by pt not meeting goal kcals/PRO intake with 7-day averages meeting 15 kcals/kg and 0.9 gm PRO/kg dosing weight      INTERVENTIONS  Implementation  Enteral Nutrition - continue EN via current access/NGT (Cortrak) and monitor accordingly     Goals  Total avg nutritional intake to meet a minimum of 30 kcal/kg and 1.5 g PRO/kg daily (per dosing wt 69 kg).    Monitoring/Evaluation  Progress toward goals will be monitored and evaluated per protocol.    Veronica Ramirez RD, LD, [facility]  Neuro ICU  Pager: [phone]

## 2021-05-21 NOTE — PROGRESS NOTES
Neurosurgery progress note    S: febrile overnight; pan-cultured; TCDs wnl    O:  Temp:  [97.3  F (36.3  C)-101.5  F (38.6  C)] 99  F (37.2  C)  Pulse:  [55-96] 55  Resp:  [10-24] 16  BP: (179)/(100) 179/100  MAP:  [72 mmHg-123 mmHg] 103 mmHg  Arterial Line BP: (113-200)/(55-85) 165/76  FiO2 (%):  [40 %] 40 %  SpO2:  [93 %-100 %] 98 %    Exam:  Incision: c/d/i, drain and EVD in place  Opens eyes to voice  FCx4    Imaging:  Head CT 5/22/21  Impression:   1. Slight decrease in the extensive subarachnoid hemorrhage overlying both cerebral hemispheres. Slight decrease in the extra-axial fluid collection over the right frontal lobe underlying the craniotomy. Stable thin subdural hemorrhage along the posterior falx and right tentorium as well as underlying the right temporal lobe. Stable intraventricular hemorrhage and right anterior temporal lobe intraparenchymal hemorrhage.  2. Diffuse sulcal effacement throughout the right cerebral hemisphere with unchanged 5 mm of leftward midline shift.  3. Stable hypodense foci in the bilateral anterior frontal lobes.        ASSESSMENT: 70 year old man presented with AMS found to have aSAH with right supraclinoid aneurysm. mFS4 and HH5. GCS 5. S/p EVD placement, DCA,s/p aneurysm clipping on 5/16.     The following conditions are also present, complicating the patient's current management, as described in the Assessment and Plan:   mechanical ventilation on day of admission, intracerebral hematoma, brain compression, acute respiratory failure, coma, based on current GCS of 5 and cerebral aneurysm rupture with hunt sims scale 5 modified lowery 4     Hypokalemia   Severe malnutrition in the context of acute on chronic illness  Respiratory failure  Pneumonia     RECOMMENDATIONS:  - EVD @ 0  - CTA/CTP (completed)  - Q1hr neuro exam  - SBP<180  - HOB > 30 degrees  - follow-up clx  -- Unasyn for PNA  - Continuous cardiac monitoring while in ICU   - Nimodipine  - daily TCDs  - Glucose <  180  - Continue glucose checks  - Platelets > 100,000  - INR < 1.5  - Hemoglobin > 8  - DVT: SCDs while in bed  - Disposition: 4A  - PT/OT consulted        Samuel Nolasco MD, PhD  Neurosurgery Resident PGY-2    Please contact neurosurgery resident on call with questions.    Dial * * [phone], enter [phone] when prompted.

## 2021-05-21 NOTE — PLAN OF CARE
Major Shift Events: Sedated; KWAME orientation; inconsistently follows commands, withdraws in all 4 extremities; PERRL; VSS; Tele NSR; EVD 0 above with ICP's 4 to 14 and EVD output 5 to 15 per hour, serosang; BS+, passing gas, tolerating TF @ 60 w 100 q2 FWF; LS clear/dim, on CMV 40% FiO2, , PEEP 8, RR 14 ; fournier having adequate urine output; Precedex @ 0.7, prn fentanyl bumps and oxycodone given for pain/ agitation (see MAR); MIVF @ 100; blood sugars replace per MAR; wife updated       Plan: continue to monitor neuro/EVD status    For vital signs and complete assessments, please see documentation flowsheets.      Anthony Garcia RN, BSN

## 2021-05-21 NOTE — PROGRESS NOTES
Neuroscience Intensive Care Progress Note    Problem List  1.  Aneurysmal subarachnoid hemorrhage, HH 5, MF 4, bleed day 5/15  2.  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  Hydrocephalus status post EVD 5/15  4.  Hypertension  5.  Acute hypoxic respiratory failure  6.  Hypernatremia  7.  Hyperchloremia  8.  Leukocytosis  9.  Normocytic anemia        Intake/Output Summary (Last 24 hours) at 5/21/2021 0911  Last data filed at 5/21/2021 0800  Gross per 24 hour   Intake 5760.08 ml   Output 2539 ml   Net 3221.08 ml           Arterial Line BP: ()/(48-98) 139/65  MAP:  [66 mmHg-130 mmHg] 89 mmHg  BP - Mean:  [94] 94    Current Medications:    acetaminophen  975 mg Oral or Feeding Tube Q8H MARAH     ampicillin-sulbactam (UNASYN) IV  3 g Intravenous Q6H     famotidine  20 mg Oral or Feeding Tube BID     heparin ANTICOAGULANT  5,000 Units Subcutaneous Q8H     insulin aspart  1-12 Units Subcutaneous Q4H     ipratropium - albuterol 0.5 mg/2.5 mg/3 mL  3 mL Nebulization Q6H     multivitamins w/minerals  15 mL Per Feeding Tube Daily     niMODipine  60 mg Oral or Feeding Tube Q4H MARAH    And     sodium chloride 0.9 %  10 mL Oral or Feeding Tube Q4H MARAH     polyethylene glycol  17 g Oral or Feeding Tube Daily     potassium phosphate (monobasic)  1,500 mg Oral or Feeding Tube TID     sodium phosphate  15 mmol Intravenous Once     senna-docusate  1 tablet Oral or Feeding Tube BID    Or     senna-docusate  2 tablet Oral or Feeding Tube BID       PRN Medications:  bisacodyl, glucose **OR** dextrose **OR** glucagon, fentaNYL, hydrALAZINE, labetalol, naloxone **OR** naloxone **OR** naloxone **OR** naloxone, ondansetron **OR** ondansetron, oxyCODONE, prochlorperazine **OR** prochlorperazine **OR** prochlorperazine, sodium chloride    Infusions:    dexmedetomidine 0.7 mcg/kg/hr (05/21/21 0700)     Plasma-Lyte A 100 mL/hr (05/21/21 0700)       No Known Allergies    Physical Examination:  BP (!) 142/78 (BP Location:  "Left arm)   Pulse 74   Temp 100.2  F (37.9  C)   Resp 18   Ht 1.803 m (5' 11\")   Wt 71.3 kg (157 lb 3 oz)   SpO2 94%   BMI 21.92 kg/m      EXAM:  Examined off sedation. Mr Neri is examined in the supine position. He is intubated. He opens his eyes to noxious stimuli.  He follows simple commands to wiggle toes. Responds \"yes\" to loud voice asking if he can hear me. Tracks but not briskly. Pupils are symmetric and react to light.  No facial asymmetry.  His arms moves purporsefully when agitated with purpose with antigravity strength.  His legs move briskly purposefully. Sensation appears grossly intact by grimace to noxious stimulus.  On Room air. Lung sounds clear.  Regular cardiac rhythm.  Soft abdomen with bowel sounds.  Groin site clean without evidence of hematoma.  No peripheral edema.    Labs/Studies:  Recent Labs   Lab Test 05/16/21  0405 05/15/21  2125 05/15/21  1526 05/15/21  1311 05/15/21  1222   * 146* 144 139 131*   POTASSIUM 3.5 3.8 3.7 3.2* 4.1   CHLORIDE 115* 113*  --  109 103   CO2 27 27  --  24 22   ANIONGAP 6 5  --  6 6   * 142* 125* 156* 215*   BUN 16 16  --  14 15   CR 0.88 0.99  --  0.78 0.78   RAGINI 8.9 8.8  --  7.4* 8.3*   WBC 15.0* 12.3*  --   --  16.5*   RBC 3.74* 3.85*  --   --  4.43   HGB 11.7* 11.8* 13.1*  --  13.6    239  --   --  257       Recent Labs   Lab Test 05/15/21  2125 05/15/21  1222 05/15/21  0930   INR 1.20* 1.16* 1.07   PTT 30 29 27       Recent Labs   Lab 05/20/21  0359 05/19/21  1612 05/19/21  1003 05/19/21  0705   PH 7.45 7.46* 7.43 7.55*   PCO2 40 35 42 29*   PO2 96 70* 65* 57*   HCO3 27 25 28 25   O2PER 40.0 50 60 2l       Imaging and labs reviewed personally in the EMR    Assessment:  Mr Neri is a 70-year-old gentleman currently admitted for management of aneurysmal subarachnoid hemorrhage.  He is now post bleed day 5, postop day 5 for right P-comm aneurysm clipping. Continues to do well. Required intubation this morning. But continues to " follow commands and wiggle toes. Will continue to monitor. Plan for close monitoring for neurologic changes associated with vasospasm (beginning of vasospasm window currently) and/or worsening hydrocephalus.      Plan:    Overnight:   NAEO      Today:  - Unasyn to ceftriaxone for klebsiella  - Adding seroquel; weaning precedex as able  - FWF to 200q2; decrease plasmalyte to 50/hr    Neuro:   #Aneurysmal subarachnoid hemorrhage, HH 5, MF 4, bleed day 5/15  #Ruptured right P-comm aneurysm status post clipping 5/15  #IVH with subsequent hydrocephalus status post LF EVD 5/15  -- precedex currently 0.7  -- LF EVD @ 0 (moved from 10 last night); ICPs 2-14, 254 (264 yesterday), 75 since midnight; CSF continues to clear;   -- q2h neuro checks  -Head of bed elevated  -Nimodipine 60q4;   -- Keppra stopped; aneurysm secured  -Normothermia, Euglycemia  -Normonatremia; allowing hypernatremia  -Daily TCD; today TCDs continue to look good TCDs normalized today  -Baseline CTA/CTP done    #agitation  - Seroquel 12.5 BID PRN  - fentanyl bumps prn   - delirium precautions    #pain management  - fentanyl gtt off; propofol off  - precedex for target RASS 0 to -1; wean as able    Resp:   #Acute hypoxic respiratory failure  -- CXR yesterday PM looks improved.   -- CXR AM tmr  -- Continue duo nebs q6h for another 24 hours   -- Saline 3% nebulizer daily PRN  -- Chest Physiotherapy with bag suctioning  -- Continue pressure support trials as able    #Intubated  - CMV-AC; wean FiO2 before weaning PEEP  Ventilation Mode: (S) CPAP/PS  (Continuous positive airway pressure with Pressure Support)  FiO2 (%): 40 %  Rate Set (breaths/minute): 14 breaths/min  Tidal Volume Set (mL): 600 mL  PEEP (cm H2O): 8 cmH2O  Pressure Support (cm H2O): 7 cmH2O  Oxygen Concentration (%): 40 %  Resp: 14        CV:   #Hypertension, relative; PTA lisinopril  # SBP 120s-150s HR: 60-70s  #Normal EF  -SBP less than 200  -Nicardipine drip running this morning now  off    Renal:   #Hypernatremia likely 2/2 volume depletion  #Hyperchloremia  -Goal normonatremia; currenlty Na - 151 from 150; with concentrated urine, -2.2L since admission, +900mL yesterday; Will continue to monitor  - Plasma-Lyte 100/hr to 50/hr  -- FWF 100q2 to 200q2  - Q12 sodium  -Monitor BMP  - I/Os: +2.3L overall; +3L yesterday; +400L since midnight;   69kg on admission; 71.3kg today; ~albumin?    Endo: No active issues.  Monitor glucose.  Hypoglycemia protocol.   - glucose 175 x 1; this AM;   - continue high resistance ISS    Heme:   #Leukocytosis  #Normocytic anemia  -Monitor CBC and vital signs    GI:   #nutrition  #NGT in place  - TF @ 60 @ goal  -Nutrition services consulted  -Free water flush (200q2)  -- Continue bowel regimen    ID:     #aspiration pneumonitis vs pneumonia; with worsening oxygenation  #Overnight fever to 101.5F (38.6F)  #GNR in sputum; near-pansensitive klebsiella; intermiediate  #unasyn switched to ceftriaxone  #tmax   - WBC decreased 14 from 16  - Begin 2g ceftriaxone q24h  - Overnight Tmax 100.9; yesterday Tmax 101.5  - Procal 0.45 this AM from 0.65    PPX:     DVT: SQH 5122Fp8    GI:  famotidine 20mg BID     Lines and Tubes:  -Right radial arterial line 5/15  -Left frontal EVD 5/15  -Left subclavian triple-lumen catheter 5/15  -Indwelling urinary catheter 5/18  -Nasogastric tube 5/16; repositioned on 5/17    Code Status: Full code    Dispo: 4A ICU. He will require ICU care for at least 1 to 2 weeks.    Patient seen and discussed with     David Sands MD  Neuro ICU Resident PGY-1    05/21/2021

## 2021-05-21 NOTE — SUMMARY OF CARE
Major Shift Events:  Neuro - Using less sedation. Titrated to 0.5 Precedex. Took 10mg once and 5 mg of Oxy once for pain control, it was effective. Patient intermittently follows commands and is moving all four extremities. He opens his eyes spontaneously, and has a few questions appropriately with head nods.     CV - Blood pressure remained elevated after giving pain medication. 10 mg of labetalol given IV. Systolic Blood pressure went from 200+ to 160.     Resp - Patient was transitioned to pressure support about 0900 and has remained that way throughout the day. Respiratory rate was low after pain medication, but Vt was high and Sp02 remained in the high 90's.     He did get up to the chair for several hours today and tolerated well.       Plan: Use prn seroquel for agitation instead of going up further on sedation. Change vent settings overnight to let the patient rest.   For vital signs and complete assessments, please see documentation flowsheets.

## 2021-05-22 ENCOUNTER — APPOINTMENT (OUTPATIENT)
Dept: ULTRASOUND IMAGING | Facility: CLINIC | Age: 71
DRG: 003 | End: 2021-05-22
Attending: STUDENT IN AN ORGANIZED HEALTH CARE EDUCATION/TRAINING PROGRAM
Payer: COMMERCIAL

## 2021-05-22 ENCOUNTER — APPOINTMENT (OUTPATIENT)
Dept: CT IMAGING | Facility: CLINIC | Age: 71
DRG: 003 | End: 2021-05-22
Attending: STUDENT IN AN ORGANIZED HEALTH CARE EDUCATION/TRAINING PROGRAM
Payer: COMMERCIAL

## 2021-05-22 LAB
ALBUMIN UR-MCNC: 20 MG/DL
ANION GAP SERPL CALCULATED.3IONS-SCNC: 6 MMOL/L (ref 3–14)
APPEARANCE CSF: ABNORMAL
APPEARANCE UR: CLEAR
BACTERIA SPEC CULT: ABNORMAL
BILIRUB UR QL STRIP: NEGATIVE
BUN SERPL-MCNC: 21 MG/DL (ref 7–30)
CALCIUM SERPL-MCNC: 8.7 MG/DL (ref 8.5–10.1)
CHLORIDE SERPL-SCNC: 116 MMOL/L (ref 94–109)
CO2 SERPL-SCNC: 26 MMOL/L (ref 20–32)
COLOR CSF: ABNORMAL
COLOR UR AUTO: ABNORMAL
CREAT SERPL-MCNC: 0.5 MG/DL (ref 0.66–1.25)
EOSINOPHIL NFR CSF MANUAL: 3 %
ERYTHROCYTE [DISTWIDTH] IN BLOOD BY AUTOMATED COUNT: 12.9 % (ref 10–15)
GFR SERPL CREATININE-BSD FRML MDRD: >90 ML/MIN/{1.73_M2}
GLUCOSE BLDC GLUCOMTR-MCNC: 140 MG/DL (ref 70–99)
GLUCOSE BLDC GLUCOMTR-MCNC: 148 MG/DL (ref 70–99)
GLUCOSE BLDC GLUCOMTR-MCNC: 161 MG/DL (ref 70–99)
GLUCOSE BLDC GLUCOMTR-MCNC: 162 MG/DL (ref 70–99)
GLUCOSE BLDC GLUCOMTR-MCNC: 169 MG/DL (ref 70–99)
GLUCOSE CSF-MCNC: 95 MG/DL (ref 40–70)
GLUCOSE SERPL-MCNC: 185 MG/DL (ref 70–99)
GLUCOSE UR STRIP-MCNC: NEGATIVE MG/DL
GRAM STN SPEC: NORMAL
HCT VFR BLD AUTO: 31.7 % (ref 40–53)
HGB BLD-MCNC: 10.1 G/DL (ref 13.3–17.7)
HGB UR QL STRIP: NEGATIVE
KETONES UR STRIP-MCNC: NEGATIVE MG/DL
LABORATORY COMMENT REPORT: NORMAL
LEUKOCYTE ESTERASE UR QL STRIP: NEGATIVE
LYMPH ABN NFR CSF MANUAL: 11 %
Lab: NORMAL
MAGNESIUM SERPL-MCNC: 2.1 MG/DL (ref 1.6–2.3)
MCH RBC QN AUTO: 30.6 PG (ref 26.5–33)
MCHC RBC AUTO-ENTMCNC: 31.9 G/DL (ref 31.5–36.5)
MCV RBC AUTO: 96 FL (ref 78–100)
MONOS+MACROS NFR CSF MANUAL: 29 %
MUCOUS THREADS #/AREA URNS LPF: PRESENT /LPF
NEUTROPHILS NFR CSF MANUAL: 57 %
NITRATE UR QL: NEGATIVE
PH UR STRIP: 6.5 PH (ref 5–7)
PHOSPHATE SERPL-MCNC: 2.4 MG/DL (ref 2.5–4.5)
PLATELET # BLD AUTO: 261 10E9/L (ref 150–450)
POTASSIUM SERPL-SCNC: 3.1 MMOL/L (ref 3.4–5.3)
POTASSIUM SERPL-SCNC: 3.5 MMOL/L (ref 3.4–5.3)
PROT CSF-MCNC: 214 MG/DL (ref 15–60)
RBC # BLD AUTO: 3.3 10E12/L (ref 4.4–5.9)
RBC # CSF MANUAL: ABNORMAL /UL (ref 0–2)
RBC #/AREA URNS AUTO: 5 /HPF (ref 0–2)
SARS-COV-2 RNA RESP QL NAA+PROBE: NEGATIVE
SODIUM SERPL-SCNC: 145 MMOL/L (ref 133–144)
SODIUM SERPL-SCNC: 147 MMOL/L (ref 133–144)
SODIUM SERPL-SCNC: 147 MMOL/L (ref 133–144)
SOURCE: ABNORMAL
SP GR UR STRIP: 1.02 (ref 1–1.03)
SPECIMEN SOURCE: ABNORMAL
SPECIMEN SOURCE: NORMAL
SQUAMOUS #/AREA URNS AUTO: 0 /HPF (ref 0–1)
TUBE # CSF: 1 #
UROBILINOGEN UR STRIP-MCNC: NORMAL MG/DL (ref 0–2)
WBC # BLD AUTO: 20.2 10E9/L (ref 4–11)
WBC # CSF MANUAL: 422 /UL (ref 0–5)
WBC #/AREA URNS AUTO: 1 /HPF (ref 0–5)

## 2021-05-22 PROCEDURE — 99291 CRITICAL CARE FIRST HOUR: CPT | Mod: GC | Performed by: PSYCHIATRY & NEUROLOGY

## 2021-05-22 PROCEDURE — 87070 CULTURE OTHR SPECIMN AEROBIC: CPT | Performed by: STUDENT IN AN ORGANIZED HEALTH CARE EDUCATION/TRAINING PROGRAM

## 2021-05-22 PROCEDURE — 250N000013 HC RX MED GY IP 250 OP 250 PS 637: Performed by: STUDENT IN AN ORGANIZED HEALTH CARE EDUCATION/TRAINING PROGRAM

## 2021-05-22 PROCEDURE — 94640 AIRWAY INHALATION TREATMENT: CPT | Mod: 76

## 2021-05-22 PROCEDURE — 80048 BASIC METABOLIC PNL TOTAL CA: CPT | Performed by: STUDENT IN AN ORGANIZED HEALTH CARE EDUCATION/TRAINING PROGRAM

## 2021-05-22 PROCEDURE — 87015 SPECIMEN INFECT AGNT CONCNTJ: CPT | Performed by: STUDENT IN AN ORGANIZED HEALTH CARE EDUCATION/TRAINING PROGRAM

## 2021-05-22 PROCEDURE — 999N000076 HC STATISTIC ICP MONITORING

## 2021-05-22 PROCEDURE — 84157 ASSAY OF PROTEIN OTHER: CPT | Performed by: STUDENT IN AN ORGANIZED HEALTH CARE EDUCATION/TRAINING PROGRAM

## 2021-05-22 PROCEDURE — 999N001017 HC STATISTIC GLUCOSE BY METER IP

## 2021-05-22 PROCEDURE — 84295 ASSAY OF SERUM SODIUM: CPT | Performed by: STUDENT IN AN ORGANIZED HEALTH CARE EDUCATION/TRAINING PROGRAM

## 2021-05-22 PROCEDURE — 83735 ASSAY OF MAGNESIUM: CPT | Performed by: STUDENT IN AN ORGANIZED HEALTH CARE EDUCATION/TRAINING PROGRAM

## 2021-05-22 PROCEDURE — 258N000003 HC RX IP 258 OP 636: Performed by: NURSE PRACTITIONER

## 2021-05-22 PROCEDURE — 87077 CULTURE AEROBIC IDENTIFY: CPT | Performed by: STUDENT IN AN ORGANIZED HEALTH CARE EDUCATION/TRAINING PROGRAM

## 2021-05-22 PROCEDURE — 999N000015 HC STATISTIC ARTERIAL MONITORING DAILY

## 2021-05-22 PROCEDURE — 84132 ASSAY OF SERUM POTASSIUM: CPT | Performed by: STUDENT IN AN ORGANIZED HEALTH CARE EDUCATION/TRAINING PROGRAM

## 2021-05-22 PROCEDURE — 200N000002 HC R&B ICU UMMC

## 2021-05-22 PROCEDURE — 999N000157 HC STATISTIC RCP TIME EA 10 MIN

## 2021-05-22 PROCEDURE — 999N000185 HC STATISTIC TRANSPORT TIME EA 15 MIN

## 2021-05-22 PROCEDURE — 89051 BODY FLUID CELL COUNT: CPT | Performed by: STUDENT IN AN ORGANIZED HEALTH CARE EDUCATION/TRAINING PROGRAM

## 2021-05-22 PROCEDURE — U0005 INFEC AGEN DETEC AMPLI PROBE: HCPCS | Performed by: STUDENT IN AN ORGANIZED HEALTH CARE EDUCATION/TRAINING PROGRAM

## 2021-05-22 PROCEDURE — U0003 INFECTIOUS AGENT DETECTION BY NUCLEIC ACID (DNA OR RNA); SEVERE ACUTE RESPIRATORY SYNDROME CORONAVIRUS 2 (SARS-COV-2) (CORONAVIRUS DISEASE [COVID-19]), AMPLIFIED PROBE TECHNIQUE, MAKING USE OF HIGH THROUGHPUT TECHNOLOGIES AS DESCRIBED BY CMS-2020-01-R: HCPCS | Performed by: STUDENT IN AN ORGANIZED HEALTH CARE EDUCATION/TRAINING PROGRAM

## 2021-05-22 PROCEDURE — 93886 INTRACRANIAL COMPLETE STUDY: CPT

## 2021-05-22 PROCEDURE — 250N000013 HC RX MED GY IP 250 OP 250 PS 637: Performed by: NURSE PRACTITIONER

## 2021-05-22 PROCEDURE — 85027 COMPLETE CBC AUTOMATED: CPT | Performed by: STUDENT IN AN ORGANIZED HEALTH CARE EDUCATION/TRAINING PROGRAM

## 2021-05-22 PROCEDURE — 70450 CT HEAD/BRAIN W/O DYE: CPT

## 2021-05-22 PROCEDURE — 70450 CT HEAD/BRAIN W/O DYE: CPT | Mod: 26 | Performed by: RADIOLOGY

## 2021-05-22 PROCEDURE — 250N000009 HC RX 250: Performed by: STUDENT IN AN ORGANIZED HEALTH CARE EDUCATION/TRAINING PROGRAM

## 2021-05-22 PROCEDURE — 250N000013 HC RX MED GY IP 250 OP 250 PS 637: Performed by: NEUROLOGICAL SURGERY

## 2021-05-22 PROCEDURE — 84100 ASSAY OF PHOSPHORUS: CPT | Performed by: STUDENT IN AN ORGANIZED HEALTH CARE EDUCATION/TRAINING PROGRAM

## 2021-05-22 PROCEDURE — 87075 CULTR BACTERIA EXCEPT BLOOD: CPT | Performed by: STUDENT IN AN ORGANIZED HEALTH CARE EDUCATION/TRAINING PROGRAM

## 2021-05-22 PROCEDURE — 93886 INTRACRANIAL COMPLETE STUDY: CPT | Mod: 26 | Performed by: STUDENT IN AN ORGANIZED HEALTH CARE EDUCATION/TRAINING PROGRAM

## 2021-05-22 PROCEDURE — 94003 VENT MGMT INPAT SUBQ DAY: CPT

## 2021-05-22 PROCEDURE — 81001 URINALYSIS AUTO W/SCOPE: CPT | Performed by: STUDENT IN AN ORGANIZED HEALTH CARE EDUCATION/TRAINING PROGRAM

## 2021-05-22 PROCEDURE — 94640 AIRWAY INHALATION TREATMENT: CPT

## 2021-05-22 PROCEDURE — 36415 COLL VENOUS BLD VENIPUNCTURE: CPT | Performed by: NEUROLOGICAL SURGERY

## 2021-05-22 PROCEDURE — 250N000011 HC RX IP 250 OP 636: Performed by: STUDENT IN AN ORGANIZED HEALTH CARE EDUCATION/TRAINING PROGRAM

## 2021-05-22 PROCEDURE — 87040 BLOOD CULTURE FOR BACTERIA: CPT | Performed by: NEUROLOGICAL SURGERY

## 2021-05-22 PROCEDURE — 87205 SMEAR GRAM STAIN: CPT | Performed by: STUDENT IN AN ORGANIZED HEALTH CARE EDUCATION/TRAINING PROGRAM

## 2021-05-22 PROCEDURE — 82945 GLUCOSE OTHER FLUID: CPT | Performed by: STUDENT IN AN ORGANIZED HEALTH CARE EDUCATION/TRAINING PROGRAM

## 2021-05-22 PROCEDURE — 250N000011 HC RX IP 250 OP 636: Performed by: NURSE PRACTITIONER

## 2021-05-22 PROCEDURE — 87186 SC STD MICRODIL/AGAR DIL: CPT | Performed by: STUDENT IN AN ORGANIZED HEALTH CARE EDUCATION/TRAINING PROGRAM

## 2021-05-22 PROCEDURE — 250N000009 HC RX 250: Performed by: NURSE PRACTITIONER

## 2021-05-22 RX ORDER — QUETIAPINE FUMARATE 25 MG/1
25 TABLET, FILM COATED ORAL 2 TIMES DAILY
Status: DISCONTINUED | OUTPATIENT
Start: 2021-05-22 | End: 2021-05-23

## 2021-05-22 RX ORDER — POTASSIUM CHLORIDE 1.5 G/1.58G
20 POWDER, FOR SOLUTION ORAL ONCE
Status: COMPLETED | OUTPATIENT
Start: 2021-05-22 | End: 2021-05-22

## 2021-05-22 RX ORDER — POTASSIUM CHLORIDE 1.5 G/1.58G
40 POWDER, FOR SOLUTION ORAL ONCE
Status: COMPLETED | OUTPATIENT
Start: 2021-05-22 | End: 2021-05-22

## 2021-05-22 RX ADMIN — Medication 10 ML: at 11:05

## 2021-05-22 RX ADMIN — NIMODIPINE 60 MG: 30 SOLUTION ORAL at 08:19

## 2021-05-22 RX ADMIN — Medication 10 ML: at 03:49

## 2021-05-22 RX ADMIN — IPRATROPIUM BROMIDE AND ALBUTEROL SULFATE 3 ML: .5; 3 SOLUTION RESPIRATORY (INHALATION) at 08:36

## 2021-05-22 RX ADMIN — ACETAMINOPHEN 975 MG: 325 TABLET, FILM COATED ORAL at 16:23

## 2021-05-22 RX ADMIN — Medication 10 ML: at 16:23

## 2021-05-22 RX ADMIN — POTASSIUM CHLORIDE 40 MEQ: 1.5 POWDER, FOR SOLUTION ORAL at 06:21

## 2021-05-22 RX ADMIN — Medication 1 PACKET: at 08:18

## 2021-05-22 RX ADMIN — NIMODIPINE 60 MG: 30 SOLUTION ORAL at 03:49

## 2021-05-22 RX ADMIN — POTASSIUM & SODIUM PHOSPHATES POWDER PACK 280-160-250 MG 1 PACKET: 280-160-250 PACK at 19:49

## 2021-05-22 RX ADMIN — DEXMEDETOMIDINE 0.7 MCG/KG/HR: 100 INJECTION, SOLUTION, CONCENTRATE INTRAVENOUS at 09:22

## 2021-05-22 RX ADMIN — NIMODIPINE 60 MG: 30 SOLUTION ORAL at 00:38

## 2021-05-22 RX ADMIN — POTASSIUM PHOSPHATE, MONOBASIC 1500 MG: 500 TABLET, SOLUBLE ORAL at 16:24

## 2021-05-22 RX ADMIN — Medication 15 ML: at 08:17

## 2021-05-22 RX ADMIN — NIMODIPINE 60 MG: 30 SOLUTION ORAL at 11:05

## 2021-05-22 RX ADMIN — ACETAMINOPHEN 975 MG: 325 TABLET, FILM COATED ORAL at 00:36

## 2021-05-22 RX ADMIN — POTASSIUM & SODIUM PHOSPHATES POWDER PACK 280-160-250 MG 1 PACKET: 280-160-250 PACK at 14:14

## 2021-05-22 RX ADMIN — POTASSIUM PHOSPHATE, MONOBASIC 1500 MG: 500 TABLET, SOLUBLE ORAL at 08:17

## 2021-05-22 RX ADMIN — IPRATROPIUM BROMIDE AND ALBUTEROL SULFATE 3 ML: .5; 3 SOLUTION RESPIRATORY (INHALATION) at 01:27

## 2021-05-22 RX ADMIN — POTASSIUM PHOSPHATE, MONOBASIC 1500 MG: 500 TABLET, SOLUBLE ORAL at 11:05

## 2021-05-22 RX ADMIN — OXYCODONE HYDROCHLORIDE 10 MG: 5 TABLET ORAL at 12:29

## 2021-05-22 RX ADMIN — Medication 12.5 MG: at 11:05

## 2021-05-22 RX ADMIN — POTASSIUM & SODIUM PHOSPHATES POWDER PACK 280-160-250 MG 1 PACKET: 280-160-250 PACK at 08:17

## 2021-05-22 RX ADMIN — HEPARIN SODIUM 5000 UNITS: 5000 INJECTION, SOLUTION INTRAVENOUS; SUBCUTANEOUS at 08:17

## 2021-05-22 RX ADMIN — ACETAMINOPHEN 975 MG: 325 TABLET, FILM COATED ORAL at 08:17

## 2021-05-22 RX ADMIN — LABETALOL HYDROCHLORIDE 20 MG: 5 INJECTION, SOLUTION INTRAVENOUS at 08:03

## 2021-05-22 RX ADMIN — LABETALOL HYDROCHLORIDE 20 MG: 5 INJECTION, SOLUTION INTRAVENOUS at 06:21

## 2021-05-22 RX ADMIN — HEPARIN SODIUM 5000 UNITS: 5000 INJECTION, SOLUTION INTRAVENOUS; SUBCUTANEOUS at 00:36

## 2021-05-22 RX ADMIN — INSULIN ASPART 2 UNITS: 100 INJECTION, SOLUTION INTRAVENOUS; SUBCUTANEOUS at 06:21

## 2021-05-22 RX ADMIN — Medication 10 ML: at 08:18

## 2021-05-22 RX ADMIN — Medication 1 PACKET: at 19:51

## 2021-05-22 RX ADMIN — NIMODIPINE 60 MG: 30 SOLUTION ORAL at 19:51

## 2021-05-22 RX ADMIN — DEXMEDETOMIDINE 0.7 MCG/KG/HR: 100 INJECTION, SOLUTION, CONCENTRATE INTRAVENOUS at 17:58

## 2021-05-22 RX ADMIN — Medication 10 ML: at 00:38

## 2021-05-22 RX ADMIN — Medication 10 ML: at 19:51

## 2021-05-22 RX ADMIN — FAMOTIDINE 20 MG: 20 TABLET ORAL at 19:50

## 2021-05-22 RX ADMIN — CEFTRIAXONE SODIUM 2 G: 2 INJECTION, POWDER, FOR SOLUTION INTRAMUSCULAR; INTRAVENOUS at 11:03

## 2021-05-22 RX ADMIN — Medication 1 PACKET: at 14:14

## 2021-05-22 RX ADMIN — INSULIN ASPART 1 UNITS: 100 INJECTION, SOLUTION INTRAVENOUS; SUBCUTANEOUS at 16:33

## 2021-05-22 RX ADMIN — INSULIN ASPART 1 UNITS: 100 INJECTION, SOLUTION INTRAVENOUS; SUBCUTANEOUS at 00:46

## 2021-05-22 RX ADMIN — HYDRALAZINE HYDROCHLORIDE 20 MG: 20 INJECTION INTRAMUSCULAR; INTRAVENOUS at 01:01

## 2021-05-22 RX ADMIN — INSULIN ASPART 1 UNITS: 100 INJECTION, SOLUTION INTRAVENOUS; SUBCUTANEOUS at 11:24

## 2021-05-22 RX ADMIN — FENTANYL CITRATE 25 MCG: 50 INJECTION, SOLUTION INTRAMUSCULAR; INTRAVENOUS at 15:10

## 2021-05-22 RX ADMIN — POTASSIUM CHLORIDE 20 MEQ: 1.5 POWDER, FOR SOLUTION ORAL at 12:29

## 2021-05-22 RX ADMIN — NIMODIPINE 60 MG: 30 SOLUTION ORAL at 16:24

## 2021-05-22 RX ADMIN — FAMOTIDINE 20 MG: 20 TABLET ORAL at 08:17

## 2021-05-22 RX ADMIN — QUETIAPINE FUMARATE 25 MG: 25 TABLET ORAL at 19:50

## 2021-05-22 RX ADMIN — INSULIN ASPART 2 UNITS: 100 INJECTION, SOLUTION INTRAVENOUS; SUBCUTANEOUS at 20:05

## 2021-05-22 RX ADMIN — HEPARIN SODIUM 5000 UNITS: 5000 INJECTION, SOLUTION INTRAVENOUS; SUBCUTANEOUS at 16:23

## 2021-05-22 RX ADMIN — INSULIN ASPART 2 UNITS: 100 INJECTION, SOLUTION INTRAVENOUS; SUBCUTANEOUS at 09:27

## 2021-05-22 RX ADMIN — OXYCODONE HYDROCHLORIDE 10 MG: 5 TABLET ORAL at 17:44

## 2021-05-22 ASSESSMENT — ACTIVITIES OF DAILY LIVING (ADL)
ADLS_ACUITY_SCORE: 21
ADLS_ACUITY_SCORE: 22
ADLS_ACUITY_SCORE: 22
ADLS_ACUITY_SCORE: 21
ADLS_ACUITY_SCORE: 21
ADLS_ACUITY_SCORE: 22

## 2021-05-22 ASSESSMENT — VISUAL ACUITY
OU: NOT TESTABLE
OU: NOT TESTABLE

## 2021-05-22 ASSESSMENT — MIFFLIN-ST. JEOR: SCORE: 1494.13

## 2021-05-22 NOTE — PLAN OF CARE
Major Shift Events: See flowsheet for hourly EVD output and ICP. No neuro changes; intermittently following some commands (wiggling toes, squeezing hands), opens eyes to voice. Moves all 4 extremities spontaneously. Restless. PRN Seroquel given; unable to wean Precedex, currently at 0.7/hour. Restraints remain in place for safety. PRN Oxy given x2. PST all day since 0900, tolerating well. Large amounts of creamy/tan/yellow ETT secretions. Labetalol given x1 this AM for SBP >200, has been meeting goal throughout the day since. Plasma-Lyte fluids Dc'd. Potassium replaced. Wife Susy at bedside.   Plan: SBP <200. Safety. Neuros Q2H.   For vital signs and complete assessments, please see documentation flowsheets.

## 2021-05-22 NOTE — PROGRESS NOTES
Neuroscience Intensive Care Progress Note  2021    REASON FOR CRITICAL CARE ADMISSION: Subarachnoid hemorrhage due to ruptured aneurysm     ADMITTING PHYSICIAN: Jamin Martinez MD     Date of Service (when I saw the patient): 21    Problem List  1.  Aneurysmal subarachnoid hemorrhage, HH 5, MF 4, bleed day 5/15  2.  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  Hydrocephalus status post EVD 5/15  4.  Hypertension  5.  Acute hypoxic respiratory failure  6.  Hypernatremia  7.  Hyperchloremia  8.  Leukocytosis  9.  Normocytic anemia    24 hour events:  Hypertension requiring PRN Labetalol and Hydralazine, remains febrile      24 Hour Vital Signs Summary:  Temperatures:  Current - Temp: 99  F (37.2  C); Max - Temp  Av.1  F (37.8  C)  Min: 97.3  F (36.3  C)  Max: 101.5  F (38.6  C)  Respiration range: Resp  Avg: 15  Min: 10  Max: 24  Pulse range: Pulse  Av.3  Min: 55  Max: 96  Blood pressure range: Systolic (24hrs), Av , Min:179 , Max:179   ; Diastolic (24hrs), Av, Min:100, Max:100  Arterial Line BP: (113-200)/() 165/76  MAP:  [72 mmHg-126 mmHg] 103 mmHg  BP - Mean:  [126] 126  Pulse oximetry range: SpO2  Av.1 %  Min: 93 %  Max: 100 %    Ventilator Settings  Ventilation Mode: CMV/AC  (Continuous Mandatory Ventilation/ Assist Control)  FiO2 (%): 40 %  Rate Set (breaths/minute): 14 breaths/min  Tidal Volume Set (mL): 600 mL  PEEP (cm H2O): 8 cmH2O  Pressure Support (cm H2O): 7 cmH2O  Oxygen Concentration (%): 40 %  Resp: 13        Intake/Output Summary (Last 24 hours) at 2021 0748  Last data filed at 2021 0700  Gross per 24 hour   Intake 6106.1 ml   Output 4373 ml   Net 1733.1 ml       Current Medications:    acetaminophen  975 mg Oral or Feeding Tube Q8H MARAH     cefTRIAXone  2 g Intravenous Q24H     famotidine  20 mg Oral or Feeding Tube BID     heparin ANTICOAGULANT  5,000 Units Subcutaneous Q8H     insulin aspart  1-12 Units Subcutaneous Q4H      "ipratropium - albuterol 0.5 mg/2.5 mg/3 mL  3 mL Nebulization Q6H     multivitamins w/minerals  15 mL Per Feeding Tube Daily     niMODipine  60 mg Oral or Feeding Tube Q4H MARAH    And     sodium chloride 0.9 %  10 mL Oral or Feeding Tube Q4H MARAH     polyethylene glycol  17 g Oral or Feeding Tube Daily     potassium & sodium phosphates  1 packet Oral TID     potassium phosphate (monobasic)  1,500 mg Oral or Feeding Tube TID     protein modular  1 packet Per Feeding Tube TID     senna-docusate  1 tablet Oral or Feeding Tube BID    Or     senna-docusate  2 tablet Oral or Feeding Tube BID       PRN Medications:  bisacodyl, glucose **OR** dextrose **OR** glucagon, fentaNYL, hydrALAZINE, labetalol, naloxone **OR** naloxone **OR** naloxone **OR** naloxone, ondansetron **OR** ondansetron, oxyCODONE, prochlorperazine **OR** prochlorperazine **OR** prochlorperazine, QUEtiapine, sodium chloride    Infusions:    dexmedetomidine 0.5 mcg/kg/hr (05/22/21 0500)     Plasma-Lyte A 50 mL/hr (05/22/21 0700)       No Known Allergies    Physical Examination:  Vitals: BP (!) 179/100   Pulse 55   Temp 99  F (37.2  C)   Resp 13   Ht 1.803 m (5' 11\")   Wt 70.2 kg (154 lb 12.2 oz)   SpO2 98%   BMI 21.59 kg/m    General: Adult male patient, lying in bed, NAD  HEENT: Normocephalic/Atraumatic, no icterus, Oral cavity/Oropharynx pink and moist  Cardiac: RRR  Chest: No SOB, pattern regular, unlabored, expansion symmetric, no retractions or use of accessory muscles, assisted mechanically   Abdomen: Soft, bowel sounds present  Extremities: Warm, no edema  Skin: No rash or lesion   Psych: Restless, agitation   Neuro:  Mental status: Drowsy, able to track examiner, intermittently follows commands, intubated.   Cranial nerves: PERRL, conjugate gaze, pupils +4 round and reactive, cough and gag intact with deep suctioning.   Motor: Normal bulk and tone. No abnormal movements. 3/5 strength in bilateral upper and lower extremities, moves all limbs " spontaneously, although RUE does not withdraw to noxious stimulation.   Sensory: Withdraws to noxious stimuli in LUE, LLE, RLE.  Coordination: KWAME, deferred.  Gait: KWAME, deferred.      Labs/Studies:  Recent Labs   Lab Test 21  0501 21  1040 21  0331 21  0359 21  0359   * 149* 151* 151*   < > 150*   POTASSIUM 3.1* 3.5 3.3* 3.1*   < > 3.3*   CHLORIDE 116*  --   --  120*  --  119*   CO2 26  --   --  29  --  27   ANIONGAP 6  --   --  3  --  4   *  --   --  124*  --  186*   BUN 21  --   --  26  --  28   CR 0.50*  --   --  0.67  --  0.68   RAGINI 8.7  --   --  8.2*  --  8.7   WBC 20.2*  --   --  14.2*  --  16.3*   RBC 3.30*  --   --  3.07*  --  3.66*   HGB 10.1*  --   --  9.7*  --  11.7*     --   --  206  --  223    < > = values in this interval not displayed.       Recent Labs   Lab Test 05/15/21  2125 05/15/21  1222 05/15/21  0930   INR 1.20* 1.16* 1.07   PTT 30 29 27       Recent Labs   Lab 21  0359 21  1612 21  1003 21  0705   PH 7.45 7.46* 7.43 7.55*   PCO2 40 35 42 29*   PO2 96 70* 65* 57*   HCO3 27 25 28 25   O2PER 40.0 50 60 2l         Imagin. CT Head w/o contrast on 21 at 0958  Impression:   1. Slight decrease in the extensive subarachnoid hemorrhage overlying  both cerebral hemispheres. Slight decrease in the extra-axial fluid  collection over the right frontal lobe underlying the craniotomy.  Stable thin subdural hemorrhage along the posterior falx and right  tentorium as well as underlying the right temporal lobe. Stable  intraventricular hemorrhage and right anterior temporal lobe  intraparenchymal hemorrhage.  2. Diffuse sulcal effacement throughout the right cerebral hemisphere  with unchanged 5 mm of leftward midline shift.  3. Stable hypodense foci in the bilateral anterior frontal lobes.    Assessment/Plan  Mr Neri is a 70-year-old gentleman currently admitted on 5/15/21 with subarachnoid hemorrhage (MF4,  HH5) and brain compression with midline shift R>L 2/2 ruptured right P-comm aneurysm. Now S/p right P-comm aneurysm clipping on 5/15. Hospital course c/b acute respiratory failure with concern for aspiration PNA, S/p re-intubation on 5/19.          Plan  Neuro:  #Subarachnoid hemorrhage (MF4, HH5), aneurysmal; PBD#7  #Ruptured R PCOM aneurysm  Cerebral angiogram on 5/15 with confirmation of rutpure  POD#7 for R craniotomy and clipping of PCOM aneurysm  - Neuro checks q2h  - Nimodipine 60 mg q4h  - Daily TCD  - HOB > 30 degrees  - Normonatremia; allowing hypernatremia  - Normothermia, Euglycemia    #IVH with subsequent hydrocephalus   #Brain compression with midline shift R>L  EVD placed 5/15  - EVD @ 0' open, ICP 0-16, drained 230 ml's in the last 24hrs    #Agitation  - Seroquel 25 mg BID; Seroquel 12.5 mg BID PRN  - Precedex gtt   - Delirium precautions    #Analgesics  - Tylenol 975 mg q8h   - Fentanyl bumps 25-50 mcg q2h PRN  - Oxycodone 5-10 mg q4h PRN     CV:  #HTN  ECHO completed on 5/17; EF 60-65%  - SBP < 200  - Holding PTA Lisinopril 10 mg daily   - Hydralazine and Labetalol PRN    Pulm:  #Acute hypoxic respiratory failure  Re-intubated 5/19  - PST as able  - Pulmonary hygiene    - Stop Duo nebs q6h   - Saline 3% nebulizer daily PRN  - Continuous pulse ox monitoring  - Maintain O2 saturations > 92%    GI/Nutrition:  #Severe malnutrition in the context of acute on chronic illness  - Protein modular 1 Pkt  - Multivitamins w/minerals    - Diet: TF's @ goal (60)  - NG   - Last BM--5/20  Bowel regimen: Scheduled Senna-docusate and Miralax daily     Renal:  #Hypernatremia; 147  #Hyperchloremia; 116  -  ml q2h    #Hypokalemia; 3.1  #Hypophosphatemia; 2.4  - Start Potassium & Sodium phosphates TID x 24 hr  - Potassium phosphate 1500 mg TID    - IV Fluids: Stop Plasma Lyte @ 50 ml/hr  - BMP daily  - Electrolyte replacement protocol in place    Endo:  #Hyperglycemia   - Follow glucose  levels    Heme:  #Leukocytosis; 14.2->20.2  #Normocyctic anemia; 10.1  - CBC daily    ID:  #Aspiration pneumonitis vs pneumonia  #Fevers  #5/19 Sputum culture with heavy growth Klebsiella oxytoca; Heavy growth Staphylococcus epidermidis; Heavy growth beta hemolytic Streptococcus constellatus   - Ceftriaxone 2 g q24h on 5/21 Day 3/7 (Unasyn q6h started on 5/20, received 4 doses before broadening to Ceftriaxone)  - Sputum 5/22--pending  - BC 5/19, 5/22 with NGTD  - CSF 5/22 ,000, , Glucose 95, Protein 214  - Follow fever curve    PPX:  DVT Prophylaxis  - SCDs in place  - Heparin subcutaneous 5000 mg q8h  GI Prophylaxis  - Famotidine 20 mg BID     Lines/Tubes/Drains:  - PIV x 1  - L subclavian CVC/TL 5/15  - R arterial line 5/15  - NG  - EVD  - Gutierrez catheter    Code Status: Full code    Dispo: ICU    The patient was seen and discussed with the attending, Dr. Starr.    Audelia LAIRD, CNP  NeuroCritical Care Nurse Practitioner  Pager: [phone]  Ascom: [phone] available M-Rodriguez [phone] to [phone]

## 2021-05-22 NOTE — PLAN OF CARE
Major Shift Events:  Inconsistently follows commands, moves all extremities purposefully. Pupils equal/reactive. EVD @ 0, open. Tmax 101.5, pancultures and CSF cultures. Labetolol and hydralazine give for SBP < 180. Restraints in place. Dex @ 0.5-0.7.  Plan: Monitor cultures and trend fever, wean dex as able.   For vital signs and complete assessments, please see documentation flowsheets.

## 2021-05-23 ENCOUNTER — APPOINTMENT (OUTPATIENT)
Dept: GENERAL RADIOLOGY | Facility: CLINIC | Age: 71
DRG: 003 | End: 2021-05-23
Attending: NEUROLOGICAL SURGERY
Payer: COMMERCIAL

## 2021-05-23 ENCOUNTER — APPOINTMENT (OUTPATIENT)
Dept: CT IMAGING | Facility: CLINIC | Age: 71
DRG: 003 | End: 2021-05-23
Attending: STUDENT IN AN ORGANIZED HEALTH CARE EDUCATION/TRAINING PROGRAM
Payer: COMMERCIAL

## 2021-05-23 ENCOUNTER — APPOINTMENT (OUTPATIENT)
Dept: ULTRASOUND IMAGING | Facility: CLINIC | Age: 71
DRG: 003 | End: 2021-05-23
Attending: STUDENT IN AN ORGANIZED HEALTH CARE EDUCATION/TRAINING PROGRAM
Payer: COMMERCIAL

## 2021-05-23 LAB
ALBUMIN SERPL-MCNC: 1.9 G/DL (ref 3.4–5)
ALP SERPL-CCNC: 150 U/L (ref 40–150)
ALT SERPL W P-5'-P-CCNC: 57 U/L (ref 0–70)
ANION GAP SERPL CALCULATED.3IONS-SCNC: 6 MMOL/L (ref 3–14)
AST SERPL W P-5'-P-CCNC: 54 U/L (ref 0–45)
BILIRUB DIRECT SERPL-MCNC: 0.2 MG/DL (ref 0–0.2)
BILIRUB SERPL-MCNC: 0.4 MG/DL (ref 0.2–1.3)
BUN SERPL-MCNC: 18 MG/DL (ref 7–30)
CALCIUM SERPL-MCNC: 8.7 MG/DL (ref 8.5–10.1)
CHLORIDE SERPL-SCNC: 111 MMOL/L (ref 94–109)
CO2 SERPL-SCNC: 26 MMOL/L (ref 20–32)
CREAT SERPL-MCNC: 0.58 MG/DL (ref 0.66–1.25)
ERYTHROCYTE [DISTWIDTH] IN BLOOD BY AUTOMATED COUNT: 12.9 % (ref 10–15)
GFR SERPL CREATININE-BSD FRML MDRD: >90 ML/MIN/{1.73_M2}
GLUCOSE BLDC GLUCOMTR-MCNC: 130 MG/DL (ref 70–99)
GLUCOSE BLDC GLUCOMTR-MCNC: 132 MG/DL (ref 70–99)
GLUCOSE BLDC GLUCOMTR-MCNC: 152 MG/DL (ref 70–99)
GLUCOSE BLDC GLUCOMTR-MCNC: 158 MG/DL (ref 70–99)
GLUCOSE BLDC GLUCOMTR-MCNC: 164 MG/DL (ref 70–99)
GLUCOSE BLDC GLUCOMTR-MCNC: 165 MG/DL (ref 70–99)
GLUCOSE SERPL-MCNC: 168 MG/DL (ref 70–99)
HCT VFR BLD AUTO: 29.4 % (ref 40–53)
HGB BLD-MCNC: 9.6 G/DL (ref 13.3–17.7)
MAGNESIUM SERPL-MCNC: 2 MG/DL (ref 1.6–2.3)
MCH RBC QN AUTO: 30.7 PG (ref 26.5–33)
MCHC RBC AUTO-ENTMCNC: 32.7 G/DL (ref 31.5–36.5)
MCV RBC AUTO: 94 FL (ref 78–100)
PHOSPHATE SERPL-MCNC: 3 MG/DL (ref 2.5–4.5)
PLATELET # BLD AUTO: 298 10E9/L (ref 150–450)
POTASSIUM SERPL-SCNC: 3.1 MMOL/L (ref 3.4–5.3)
POTASSIUM SERPL-SCNC: 3.5 MMOL/L (ref 3.4–5.3)
PROT SERPL-MCNC: 6.1 G/DL (ref 6.8–8.8)
RBC # BLD AUTO: 3.13 10E12/L (ref 4.4–5.9)
SODIUM SERPL-SCNC: 142 MMOL/L (ref 133–144)
SODIUM SERPL-SCNC: 142 MMOL/L (ref 133–144)
SODIUM SERPL-SCNC: 143 MMOL/L (ref 133–144)
WBC # BLD AUTO: 18.8 10E9/L (ref 4–11)

## 2021-05-23 PROCEDURE — 999N000127 HC STATISTIC PERIPHERAL IV START W US GUIDANCE

## 2021-05-23 PROCEDURE — 85027 COMPLETE CBC AUTOMATED: CPT | Performed by: STUDENT IN AN ORGANIZED HEALTH CARE EDUCATION/TRAINING PROGRAM

## 2021-05-23 PROCEDURE — 250N000011 HC RX IP 250 OP 636: Performed by: STUDENT IN AN ORGANIZED HEALTH CARE EDUCATION/TRAINING PROGRAM

## 2021-05-23 PROCEDURE — 250N000013 HC RX MED GY IP 250 OP 250 PS 637: Performed by: STUDENT IN AN ORGANIZED HEALTH CARE EDUCATION/TRAINING PROGRAM

## 2021-05-23 PROCEDURE — 70496 CT ANGIOGRAPHY HEAD: CPT

## 2021-05-23 PROCEDURE — 94003 VENT MGMT INPAT SUBQ DAY: CPT

## 2021-05-23 PROCEDURE — 250N000011 HC RX IP 250 OP 636: Performed by: NEUROLOGICAL SURGERY

## 2021-05-23 PROCEDURE — 200N000002 HC R&B ICU UMMC

## 2021-05-23 PROCEDURE — 250N000009 HC RX 250: Performed by: STUDENT IN AN ORGANIZED HEALTH CARE EDUCATION/TRAINING PROGRAM

## 2021-05-23 PROCEDURE — 258N000003 HC RX IP 258 OP 636: Performed by: NURSE PRACTITIONER

## 2021-05-23 PROCEDURE — 99207 CT HEAD W/O CONTRAST: CPT | Mod: 26 | Performed by: RADIOLOGY

## 2021-05-23 PROCEDURE — 71045 X-RAY EXAM CHEST 1 VIEW: CPT | Mod: 26 | Performed by: RADIOLOGY

## 2021-05-23 PROCEDURE — 80048 BASIC METABOLIC PNL TOTAL CA: CPT | Performed by: STUDENT IN AN ORGANIZED HEALTH CARE EDUCATION/TRAINING PROGRAM

## 2021-05-23 PROCEDURE — 999N001017 HC STATISTIC GLUCOSE BY METER IP

## 2021-05-23 PROCEDURE — 80076 HEPATIC FUNCTION PANEL: CPT | Performed by: STUDENT IN AN ORGANIZED HEALTH CARE EDUCATION/TRAINING PROGRAM

## 2021-05-23 PROCEDURE — 99291 CRITICAL CARE FIRST HOUR: CPT | Mod: GC | Performed by: PSYCHIATRY & NEUROLOGY

## 2021-05-23 PROCEDURE — 0042T CT HEAD PERFUSION WITH CONTRAST: CPT

## 2021-05-23 PROCEDURE — 999N000185 HC STATISTIC TRANSPORT TIME EA 15 MIN

## 2021-05-23 PROCEDURE — 84132 ASSAY OF SERUM POTASSIUM: CPT | Performed by: STUDENT IN AN ORGANIZED HEALTH CARE EDUCATION/TRAINING PROGRAM

## 2021-05-23 PROCEDURE — 0042T CT HEAD PERFUSION WITH CONTRAST: CPT | Mod: GC | Performed by: RADIOLOGY

## 2021-05-23 PROCEDURE — 93886 INTRACRANIAL COMPLETE STUDY: CPT | Mod: 26 | Performed by: RADIOLOGY

## 2021-05-23 PROCEDURE — 84100 ASSAY OF PHOSPHORUS: CPT | Performed by: STUDENT IN AN ORGANIZED HEALTH CARE EDUCATION/TRAINING PROGRAM

## 2021-05-23 PROCEDURE — 999N000015 HC STATISTIC ARTERIAL MONITORING DAILY

## 2021-05-23 PROCEDURE — 250N000013 HC RX MED GY IP 250 OP 250 PS 637: Performed by: NURSE PRACTITIONER

## 2021-05-23 PROCEDURE — 84295 ASSAY OF SERUM SODIUM: CPT | Performed by: STUDENT IN AN ORGANIZED HEALTH CARE EDUCATION/TRAINING PROGRAM

## 2021-05-23 PROCEDURE — 83735 ASSAY OF MAGNESIUM: CPT | Performed by: STUDENT IN AN ORGANIZED HEALTH CARE EDUCATION/TRAINING PROGRAM

## 2021-05-23 PROCEDURE — 71045 X-RAY EXAM CHEST 1 VIEW: CPT

## 2021-05-23 PROCEDURE — 70450 CT HEAD/BRAIN W/O DYE: CPT

## 2021-05-23 PROCEDURE — 250N000009 HC RX 250: Performed by: NURSE PRACTITIONER

## 2021-05-23 PROCEDURE — 70498 CT ANGIOGRAPHY NECK: CPT | Mod: 26 | Performed by: RADIOLOGY

## 2021-05-23 PROCEDURE — 250N000013 HC RX MED GY IP 250 OP 250 PS 637: Performed by: NEUROLOGICAL SURGERY

## 2021-05-23 PROCEDURE — 999N000157 HC STATISTIC RCP TIME EA 10 MIN

## 2021-05-23 PROCEDURE — 70496 CT ANGIOGRAPHY HEAD: CPT | Mod: 26 | Performed by: RADIOLOGY

## 2021-05-23 PROCEDURE — 999N000076 HC STATISTIC ICP MONITORING

## 2021-05-23 PROCEDURE — 93886 INTRACRANIAL COMPLETE STUDY: CPT

## 2021-05-23 RX ORDER — POTASSIUM CHLORIDE 750 MG/1
20 TABLET, EXTENDED RELEASE ORAL 2 TIMES DAILY
Status: DISCONTINUED | OUTPATIENT
Start: 2021-05-23 | End: 2021-05-23

## 2021-05-23 RX ORDER — PIPERACILLIN SODIUM, TAZOBACTAM SODIUM 4; .5 G/20ML; G/20ML
4.5 INJECTION, POWDER, LYOPHILIZED, FOR SOLUTION INTRAVENOUS EVERY 6 HOURS
Status: DISCONTINUED | OUTPATIENT
Start: 2021-05-23 | End: 2021-05-27

## 2021-05-23 RX ORDER — IOPAMIDOL 755 MG/ML
115 INJECTION, SOLUTION INTRAVASCULAR ONCE
Status: COMPLETED | OUTPATIENT
Start: 2021-05-23 | End: 2021-05-23

## 2021-05-23 RX ORDER — CODEINE PHOSPHATE AND GUAIFENESIN 10; 100 MG/5ML; MG/5ML
5 SOLUTION ORAL EVERY 4 HOURS PRN
Status: DISCONTINUED | OUTPATIENT
Start: 2021-05-23 | End: 2021-05-23

## 2021-05-23 RX ORDER — CODEINE PHOSPHATE AND GUAIFENESIN 10; 100 MG/5ML; MG/5ML
5 SOLUTION ORAL EVERY 6 HOURS
Status: DISCONTINUED | OUTPATIENT
Start: 2021-05-23 | End: 2021-05-23

## 2021-05-23 RX ORDER — QUETIAPINE FUMARATE 50 MG/1
50 TABLET, FILM COATED ORAL 2 TIMES DAILY
Status: DISCONTINUED | OUTPATIENT
Start: 2021-05-23 | End: 2021-05-29

## 2021-05-23 RX ORDER — ACETAMINOPHEN 325 MG/1
650 TABLET ORAL EVERY 4 HOURS PRN
Status: DISCONTINUED | OUTPATIENT
Start: 2021-05-23 | End: 2021-06-01

## 2021-05-23 RX ORDER — POTASSIUM CHLORIDE 1.5 G/1.58G
40 POWDER, FOR SOLUTION ORAL ONCE
Status: COMPLETED | OUTPATIENT
Start: 2021-05-23 | End: 2021-05-23

## 2021-05-23 RX ORDER — MAGNESIUM SULFATE HEPTAHYDRATE 40 MG/ML
2 INJECTION, SOLUTION INTRAVENOUS ONCE
Status: COMPLETED | OUTPATIENT
Start: 2021-05-23 | End: 2021-05-23

## 2021-05-23 RX ORDER — POTASSIUM CHLORIDE 1.5 G/1.58G
20 POWDER, FOR SOLUTION ORAL 2 TIMES DAILY
Status: DISCONTINUED | OUTPATIENT
Start: 2021-05-23 | End: 2021-06-11 | Stop reason: HOSPADM

## 2021-05-23 RX ORDER — POTASSIUM CHLORIDE 1.5 G/1.58G
20 POWDER, FOR SOLUTION ORAL ONCE
Status: COMPLETED | OUTPATIENT
Start: 2021-05-23 | End: 2021-05-23

## 2021-05-23 RX ORDER — DOXAZOSIN 1 MG/1
1 TABLET ORAL DAILY
Status: DISCONTINUED | OUTPATIENT
Start: 2021-05-23 | End: 2021-05-30

## 2021-05-23 RX ADMIN — INSULIN ASPART 1 UNITS: 100 INJECTION, SOLUTION INTRAVENOUS; SUBCUTANEOUS at 20:21

## 2021-05-23 RX ADMIN — HEPARIN SODIUM 5000 UNITS: 5000 INJECTION, SOLUTION INTRAVENOUS; SUBCUTANEOUS at 08:00

## 2021-05-23 RX ADMIN — Medication 10 ML: at 08:00

## 2021-05-23 RX ADMIN — CEFTRIAXONE SODIUM 2 G: 2 INJECTION, POWDER, FOR SOLUTION INTRAMUSCULAR; INTRAVENOUS at 09:41

## 2021-05-23 RX ADMIN — NIMODIPINE 60 MG: 30 SOLUTION ORAL at 00:40

## 2021-05-23 RX ADMIN — INSULIN ASPART 1 UNITS: 100 INJECTION, SOLUTION INTRAVENOUS; SUBCUTANEOUS at 13:20

## 2021-05-23 RX ADMIN — QUETIAPINE 50 MG: 50 TABLET ORAL at 19:57

## 2021-05-23 RX ADMIN — Medication 15 ML: at 08:00

## 2021-05-23 RX ADMIN — PIPERACILLIN SODIUM AND TAZOBACTAM SODIUM 4.5 G: 4; .5 INJECTION, POWDER, LYOPHILIZED, FOR SOLUTION INTRAVENOUS at 16:07

## 2021-05-23 RX ADMIN — POTASSIUM CHLORIDE 20 MEQ: 1.5 POWDER, FOR SOLUTION ORAL at 16:07

## 2021-05-23 RX ADMIN — NIMODIPINE 60 MG: 30 SOLUTION ORAL at 03:54

## 2021-05-23 RX ADMIN — NIMODIPINE 60 MG: 30 SOLUTION ORAL at 19:57

## 2021-05-23 RX ADMIN — DEXMEDETOMIDINE 0.6 MCG/KG/HR: 100 INJECTION, SOLUTION, CONCENTRATE INTRAVENOUS at 10:34

## 2021-05-23 RX ADMIN — NIMODIPINE 60 MG: 30 SOLUTION ORAL at 08:01

## 2021-05-23 RX ADMIN — FAMOTIDINE 20 MG: 20 TABLET ORAL at 08:00

## 2021-05-23 RX ADMIN — PIPERACILLIN SODIUM AND TAZOBACTAM SODIUM 4.5 G: 4; .5 INJECTION, POWDER, LYOPHILIZED, FOR SOLUTION INTRAVENOUS at 21:47

## 2021-05-23 RX ADMIN — MAGNESIUM SULFATE HEPTAHYDRATE 2 G: 40 INJECTION, SOLUTION INTRAVENOUS at 05:54

## 2021-05-23 RX ADMIN — Medication 10 ML: at 15:30

## 2021-05-23 RX ADMIN — GUAIFENESIN 10 ML: 100 SOLUTION ORAL at 15:29

## 2021-05-23 RX ADMIN — POTASSIUM CHLORIDE 40 MEQ: 1.5 POWDER, FOR SOLUTION ORAL at 05:54

## 2021-05-23 RX ADMIN — Medication 1 PACKET: at 13:14

## 2021-05-23 RX ADMIN — Medication 10 ML: at 03:54

## 2021-05-23 RX ADMIN — NIMODIPINE 60 MG: 30 SOLUTION ORAL at 15:30

## 2021-05-23 RX ADMIN — POTASSIUM PHOSPHATE, MONOBASIC 1500 MG: 500 TABLET, SOLUBLE ORAL at 08:01

## 2021-05-23 RX ADMIN — POTASSIUM CHLORIDE 20 MEQ: 1.5 POWDER, FOR SOLUTION ORAL at 13:14

## 2021-05-23 RX ADMIN — Medication 12.5 MG: at 15:41

## 2021-05-23 RX ADMIN — Medication 1 PACKET: at 19:59

## 2021-05-23 RX ADMIN — POTASSIUM CHLORIDE 20 MEQ: 1.5 POWDER, FOR SOLUTION ORAL at 19:57

## 2021-05-23 RX ADMIN — QUETIAPINE FUMARATE 25 MG: 25 TABLET ORAL at 08:00

## 2021-05-23 RX ADMIN — INSULIN ASPART 1 UNITS: 100 INJECTION, SOLUTION INTRAVENOUS; SUBCUTANEOUS at 03:58

## 2021-05-23 RX ADMIN — HEPARIN SODIUM 5000 UNITS: 5000 INJECTION, SOLUTION INTRAVENOUS; SUBCUTANEOUS at 15:34

## 2021-05-23 RX ADMIN — ACETAMINOPHEN 650 MG: 325 TABLET, FILM COATED ORAL at 15:41

## 2021-05-23 RX ADMIN — IOPAMIDOL 115 ML: 755 INJECTION, SOLUTION INTRAVENOUS at 14:36

## 2021-05-23 RX ADMIN — NIMODIPINE 60 MG: 30 SOLUTION ORAL at 13:15

## 2021-05-23 RX ADMIN — Medication 10 ML: at 00:40

## 2021-05-23 RX ADMIN — GUAIFENESIN 10 ML: 100 SOLUTION ORAL at 21:49

## 2021-05-23 RX ADMIN — ACETAMINOPHEN 975 MG: 325 TABLET, FILM COATED ORAL at 08:00

## 2021-05-23 RX ADMIN — Medication 1 PACKET: at 08:01

## 2021-05-23 RX ADMIN — FAMOTIDINE 20 MG: 20 TABLET ORAL at 19:57

## 2021-05-23 RX ADMIN — DOXAZOSIN 1 MG: 1 TABLET ORAL at 09:43

## 2021-05-23 RX ADMIN — ACETAMINOPHEN 975 MG: 325 TABLET, FILM COATED ORAL at 00:40

## 2021-05-23 RX ADMIN — Medication 10 ML: at 19:57

## 2021-05-23 RX ADMIN — OXYCODONE HYDROCHLORIDE 10 MG: 5 TABLET ORAL at 17:17

## 2021-05-23 RX ADMIN — POTASSIUM CHLORIDE 20 MEQ: 1.5 POWDER, FOR SOLUTION ORAL at 09:43

## 2021-05-23 RX ADMIN — Medication 10 ML: at 13:15

## 2021-05-23 RX ADMIN — DEXMEDETOMIDINE 0.6 MCG/KG/HR: 100 INJECTION, SOLUTION, CONCENTRATE INTRAVENOUS at 20:24

## 2021-05-23 RX ADMIN — INSULIN ASPART 2 UNITS: 100 INJECTION, SOLUTION INTRAVENOUS; SUBCUTANEOUS at 15:50

## 2021-05-23 RX ADMIN — HEPARIN SODIUM 5000 UNITS: 5000 INJECTION, SOLUTION INTRAVENOUS; SUBCUTANEOUS at 00:40

## 2021-05-23 RX ADMIN — DEXMEDETOMIDINE 0.7 MCG/KG/HR: 100 INJECTION, SOLUTION, CONCENTRATE INTRAVENOUS at 02:40

## 2021-05-23 ASSESSMENT — VISUAL ACUITY
OU: NOT TESTABLE

## 2021-05-23 ASSESSMENT — ACTIVITIES OF DAILY LIVING (ADL)
ADLS_ACUITY_SCORE: 22

## 2021-05-23 ASSESSMENT — MIFFLIN-ST. JEOR: SCORE: 1496.11

## 2021-05-23 NOTE — PROGRESS NOTES
Neurosurgery progress note    S: GERBER    O:  Temp:  [99.1  F (37.3  C)-101.3  F (38.5  C)] 99.1  F (37.3  C)  Pulse:  [59-83] 62  Resp:  [12-20] 19  BP: (125-168)/(69-96) 152/94  MAP:  [97 mmHg-134 mmHg] 117 mmHg  Arterial Line BP: (123-218)/() 196/82  FiO2 (%):  [30 %-40 %] 30 %  SpO2:  [94 %-100 %] 97 %    Exam:  Incision: c/d/i, drain and EVD in place  Opens eyes to voice  FCx4    Imaging:  Head CT 5/22/21  Impression:   1. Slight decrease in the extensive subarachnoid hemorrhage overlying both cerebral hemispheres. Slight decrease in the extra-axial fluid collection over the right frontal lobe underlying the craniotomy. Stable thin subdural hemorrhage along the posterior falx and right tentorium as well as underlying the right temporal lobe. Stable intraventricular hemorrhage and right anterior temporal lobe intraparenchymal hemorrhage.  2. Diffuse sulcal effacement throughout the right cerebral hemisphere with unchanged 5 mm of leftward midline shift.  3. Stable hypodense foci in the bilateral anterior frontal lobes.        ASSESSMENT: 70 year old man presented with AMS found to have aSAH with right supraclinoid aneurysm. mFS4 and HH5. GCS 5. S/p EVD placement, DCA,s/p aneurysm clipping on 5/16.     The following conditions are also present, complicating the patient's current management, as described in the Assessment and Plan:   mechanical ventilation on day of admission, intracerebral hematoma, brain compression, acute respiratory failure, coma, based on current GCS of 5 and cerebral aneurysm rupture with hunt sims scale 5 modified lowery 4     Hypokalemia   Severe malnutrition in the context of acute on chronic illness  Respiratory failure  Pneumonia     RECOMMENDATIONS:  - EVD @ 0  - Q1hr neuro exam  - SBP<200  - HOB > 30 degrees  - follow-up clx  -- Ceftriaxone for PNA  - Continuous cardiac monitoring while in ICU   - Nimodipine  - daily TCDs  - Glucose < 180  - Continue glucose checks  - Platelets  > 100,000  - INR < 1.5  - Hemoglobin > 8  - DVT: SCDs while in bed  - Disposition: 4A  - PT/OT consulted        Myranda Woodson MD  Neurosurgery Resident PGY-2    Please contact neurosurgery resident on call with questions.    Dial * [phone], enter [phone] when prompted.

## 2021-05-23 NOTE — PLAN OF CARE
Major Shift Events:  Neuros unchanged. EVD @ 0, open. Consistently moderate to large tan/creamy/thick secretions. SBP < 200 w/o intervention. Tmax 101.1, cold packs and fan in place, scheduled tylenol. K and Mg replaced. Dex remains at 0.7. Medium loose incontinent BM. Wife Susy updated.  Plan: trend fevers and cultures. Wean dex as possible   For vital signs and complete assessments, please see documentation flowsheets.

## 2021-05-23 NOTE — PLAN OF CARE
Major Shift Events: See flowsheet for hourly EVD output and ICP. CT completed. Continues to intermittently follow some commands, arouse to stimulation, and move all 4 extremities spontaneously. Precedex weaned to 0.5/hour. PRN Seroquel given x1 in addition to scheduled AM dose, PRN oxy given x1. PST 7/5 throughout shift, FiO2 weaned to 30%. Remains febrile; Zosyn started. -160s/90. Good UOP via fournier. FWF decreased to 100 q2h; Na 142 this afternoon. Potassium replaced.   Plan: SBP <200. Neuros Q2H.   For vital signs and complete assessments, please see documentation flowsheets.

## 2021-05-23 NOTE — PROGRESS NOTES
Neuroscience Intensive Care Progress Note  2021    REASON FOR CRITICAL CARE ADMISSION: Subarachnoid hemorrhage due to ruptured aneurysm     ADMITTING PHYSICIAN: Jamin Martinez MD     Date of Service (when I saw the patient): 21    Problem List  1.  Aneurysmal subarachnoid hemorrhage, HH 5, MF 4, bleed day 5/15  2.  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  Hydrocephalus status post EVD 5/15  4.  Hypertension  5.  Acute hypoxic respiratory failure  6.  Hypernatremia  7.  Hyperchloremia  8.  Leukocytosis  9.  Normocytic anemia    24 hour events:  Moderate to large tan/creamy/thick secretions, persistent fever.     24 Hour Vital Signs Summary:  Temperatures:  Current - Temp: 99.1  F (37.3  C); Max - Temp  Av.7  F (38.2  C)  Min: 99.1  F (37.3  C)  Max: 101.3  F (38.5  C)  Respiration range: Resp  Av.9  Min: 12  Max: 20  Pulse range: Pulse  Av.6  Min: 59  Max: 83  Blood pressure range: Systolic (24hrs), Av , Min:125 , Max:168   ; Diastolic (24hrs), Av, Min:69, Max:96  Arterial Line BP: (123-218)/() 196/82  MAP:  [97 mmHg-134 mmHg] 117 mmHg  BP - Mean:  [] 117  Pulse oximetry range: SpO2  Av.4 %  Min: 94 %  Max: 100 %    Ventilator Settings  Ventilation Mode: CPAP/PS  (Continuous positive airway pressure with Pressure Support)  FiO2 (%): 30 %  Rate Set (breaths/minute): 14 breaths/min  Tidal Volume Set (mL): 600 mL  PEEP (cm H2O): 5 cmH2O  Pressure Support (cm H2O): 7 cmH2O  Oxygen Concentration (%): 40 %  Resp: 19        Intake/Output Summary (Last 24 hours) at 2021 1434  Last data filed at 2021 1400  Gross per 24 hour   Intake 4653.66 ml   Output 4639 ml   Net 14.66 ml       Current Medications:    cefTRIAXone  2 g Intravenous Q24H     doxazosin  1 mg Oral or Feeding Tube Daily     famotidine  20 mg Oral or Feeding Tube BID     heparin ANTICOAGULANT  5,000 Units Subcutaneous Q8H     insulin aspart  1-12 Units Subcutaneous Q4H      "iopamidol  115 mL Intravenous Once     multivitamins w/minerals  15 mL Per Feeding Tube Daily     niMODipine  60 mg Oral or Feeding Tube Q4H MARAH    And     sodium chloride 0.9 %  10 mL Oral or Feeding Tube Q4H MARAH     polyethylene glycol  17 g Oral or Feeding Tube Daily     potassium chloride  20 mEq Oral or Feeding Tube BID     protein modular  1 packet Per Feeding Tube TID     QUEtiapine  50 mg Oral or Feeding Tube BID     senna-docusate  1 tablet Oral or Feeding Tube BID    Or     senna-docusate  2 tablet Oral or Feeding Tube BID     sodium chloride (PF)  100 mL Intravenous Once       PRN Medications:  acetaminophen, bisacodyl, glucose **OR** dextrose **OR** glucagon, fentaNYL, guaiFENesin-codeine, hydrALAZINE, labetalol, naloxone **OR** naloxone **OR** naloxone **OR** naloxone, ondansetron **OR** ondansetron, oxyCODONE, prochlorperazine **OR** prochlorperazine **OR** prochlorperazine, QUEtiapine, sodium chloride    Infusions:    dexmedetomidine 0.7 mcg/kg/hr (05/23/21 1400)       No Known Allergies    Physical Examination:  Vitals: BP (!) 152/94   Pulse 62   Temp 99.1  F (37.3  C) (Esophageal)   Resp 19   Ht 1.803 m (5' 11\")   Wt 70.4 kg (155 lb 3.2 oz)   SpO2 97%   BMI 21.65 kg/m    General: Adult male patient, lying in bed, NAD  HEENT: Normocephalic/Atraumatic, no icterus, Oral cavity/Oropharynx pink and moist  Cardiac: RRR  Chest: No SOB, pattern regular, unlabored, expansion symmetric, no retractions or use of accessory muscles, assisted mechanically   Abdomen: Soft, bowel sounds present  Extremities: Warm, no edema  Skin: No rash or lesion   Psych: Restless, agitation   Neuro:  Mental status: Drowsy, able to track examiner, intermittently follows commands, intubated.   Cranial nerves: PERRL, conjugate gaze, pupils +4 round and reactive, cough and gag intact with deep suctioning.   Motor: Normal bulk and tone. No abnormal movements. 3/5 strength in bilateral upper and lower extremities, moves all " limbs spontaneously, although RUE does not withdraw to noxious stimulation.   Sensory: Withdraws to noxious stimuli in LUE, LLE, RLE.  Coordination: KWAME, deferred.  Gait: KWAME, deferred.    Labs/Studies:  Recent Labs   Lab Test 21  1304 21  0346 21  1053 21  0501 21  0331 21  0331    143 145* 147* 147*   < > 151*   POTASSIUM 3.5 3.1*  --  3.5 3.1*   < > 3.1*   CHLORIDE  --  111*  --   --  116*  --  120*   CO2  --  26  --   --  26  --  29   ANIONGAP  --  6  --   --  6  --  3   GLC  --  168*  --   --  185*  --  124*   BUN  --  18  --   --  21  --  26   CR  --  0.58*  --   --  0.50*  --  0.67   RAGINI  --  8.7  --   --  8.7  --  8.2*   WBC  --  18.8*  --   --  20.2*  --  14.2*   RBC  --  3.13*  --   --  3.30*  --  3.07*   HGB  --  9.6*  --   --  10.1*  --  9.7*   PLT  --  298  --   --  261  --  206    < > = values in this interval not displayed.       Recent Labs   Lab Test 05/15/21  2125 05/15/21  1222 05/15/21  0930   INR 1.20* 1.16* 1.07   PTT 30 29 27       Recent Labs   Lab 21  0359 21  1612 21  1003 21  0705   PH 7.45 7.46* 7.43 7.55*   PCO2 40 35 42 29*   PO2 96 70* 65* 57*   HCO3 27 25 28 25   O2PER 40.0 50 60 2l         Imagin. CT Head w/o contrast on 21 at 0638  Impression:   1. No significant change in the large volume of subarachnoid  hemorrhage overlying both cerebral hemispheres, the extra-axial fluid  collection over the right frontal lobe, right anterior temporal lobe  intraparenchymal hemorrhage, and intraventricular hemorrhage.  Unchanged subdural hemorrhage along the undersurface of the right  temporal lobe and along the posterior falx and right tentorium.  2. Slightly decreased leftward midline shift.  3. Stable hypodense foci in the bilateral anterior frontal lobes.    Assessment/Plan  #Subarachnoid hemorrhage (MF4, HH5), aneurysmal; PBD#8  #Ruptured R PCOM aneurysm  Cerebral angiogram on 5/15 with confirmation  gerald rutpure  POD#8 for R craniotomy and clipping of PCOM aneurysm  - Neuro checks q2h  - Nimodipine 60 mg q4h  - Daily TCD  - HOB > 30 degrees  - Normonatremia; allowing hypernatremia  - Normothermia, Euglycemia     #IVH with subsequent hydrocephalus   #Brain compression with midline shift R>L  EVD placed 5/15  - EVD @ 0' open, ICP 6-14, drained 236 ml's in the last 24hrs     #Agitation  - Seroquel 50 mg BID; Seroquel 12.5 mg BID PRN  - Precedex gtt   - Delirium precautions     #Analgesics  - Tylenol 650 mg q4h PRN   - Fentanyl bumps 25-50 mcg q2h PRN  - Oxycodone 5-10 mg q4h PRN      CV:  #HTN  ECHO completed on 5/17; EF 60-65%  - SBP < 200  - Holding PTA Lisinopril 10 mg daily   - Hydralazine and Labetalol PRN     Pulm:  #Acute hypoxic respiratory failure  Re-intubated 5/19  - PST as able  - Pulmonary hygiene    - CXR today  - Start Guaifenesin q6h    - Saline 3% nebulizer daily PRN  - Continuous pulse ox monitoring  - Maintain O2 saturations > 92%     GI/Nutrition:  #Severe malnutrition in the context of acute on chronic illness  - Protein modular 1 Pkt  - Multivitamins w/minerals     - Diet: TF's @ goal (60)  - NG   - Last BM--5/23  Bowel regimen: Scheduled Senna-docusate and Miralax daily      Renal:  #Hyperchloremia; 111  -  ml q2h     #Hypokalemia; 3.1  - Potassium chloride 20 mEq BID    #Urinary retention  - Start Doxazosin 1 mg daily      - IV Fluids: None  - BMP daily  - Electrolyte replacement protocol in place     Endo:  #Hyperglycemia   - Follow glucose levels     Heme:  #Leukocytosis; 20.2->18.8  #Normocyctic anemia; 9.6  - CBC daily     ID:  #Aspiration pneumonitis vs pneumonia  #Fevers  #5/19 Sputum culture with heavy growth Klebsiella oxytoca; Heavy growth Staphylococcus epidermidis; Heavy growth beta hemolytic Streptococcus constellatus   - Ceftriaxone 2 g q24h on 5/21 Day 2/7 (Unasyn q6h started on 5/20, received 4 doses before broadening to Ceftriaxone)  - Sputum 5/22--pending  - Add  LFTs  - BC 5/19, 5/22 with NGTD  - CSF 5/22 ,000, , Glucose 95, Protein 214  - Follow fever curve     PPX:  DVT Prophylaxis  - SCDs in place  - Heparin subcutaneous 5000 mg q8h  GI Prophylaxis  - Famotidine 20 mg BID      Lines/Tubes/Drains:  - PIV x 1  - L subclavian CVC/TL 5/15  - Pull R arterial line 5/15  - NG  - EVD  - Gutierrez catheter     Code Status: Full code     Dispo: ICU    The patient was seen and discussed with the attending, Dr. Starr.    Audelia LAIRD, CNP  NeuroCritical Care Nurse Practitioner  Pager: [phone]  Ascom: [phone] available M-Rodriguez [phone] to [phone]

## 2021-05-23 NOTE — PROGRESS NOTES
Neurosurgery progress note    S: no acute events overnight    O:  Temp:  [99  F (37.2  C)-102  F (38.9  C)] 99.7  F (37.6  C)  Pulse:  [59-93] 67  Resp:  [15-21] 20  BP: (138-169)/(81-99) 156/88  FiO2 (%):  [30 %] 30 %  SpO2:  [93 %-98 %] 96 %    Exam:  Incision: c/d/i, drain and EVD in place  Opens eyes to voice  FCx4    Imaging:  CTA/CTP 5/23  Impression:    1. Head CTA demonstrates right posterior communicating artery aneurysm surgical clipping. No residual aneurysm filling or contrast extravasation. No stenosis of the major intracranial arteries.   2. Neck CTA demonstrates no stenosis of the major cervical arteries.  3. Partially visualized right lung consolidation and patchy right upper lobe ground glass opacities. Recommend correlation with chest x-ray.    Impression:   No change in small area of decreased CBV and CBF in the anterior right temporal lobe corresponding to region of known hemorrhage. No new perfusion abnormality.        ASSESSMENT: 70 year old man presented with AMS found to have aSAH with right supraclinoid aneurysm. mFS4 and HH5. GCS 5. S/p EVD placement, DCA,s/p aneurysm clipping on 5/16.     The following conditions are also present, complicating the patient's current management, as described in the Assessment and Plan:   mechanical ventilation on day of admission, intracerebral hematoma, brain compression, acute respiratory failure, coma, based on current GCS of 5 and cerebral aneurysm rupture with hunt sims scale 5 modified lowery 4     Hypokalemia   Severe malnutrition in the context of acute on chronic illness  Respiratory failure  Pneumonia     RECOMMENDATIONS:  - EVD @ 0  - Q1hr neuro exam  - CT tmr AM  - SBP<200  - HOB > 30 degrees  - follow-up clx  -- Ceftriaxone for PNA  - Continuous cardiac monitoring while in ICU   - Nimodipine  - daily TCDs  - Glucose < 180  - Continue glucose checks  - Platelets > 100,000  - INR < 1.5  - Hemoglobin > 8  - DVT: SCDs while in  bed  - Disposition: 4A  - PT/OT consulted        Samuel Nolasco MD, PhD  Neurosurgery Resident PGY-2    Please contact neurosurgery resident on call with questions.    Dial * * [phone], enter [phone] when prompted.

## 2021-05-24 ENCOUNTER — APPOINTMENT (OUTPATIENT)
Dept: ULTRASOUND IMAGING | Facility: CLINIC | Age: 71
DRG: 003 | End: 2021-05-24
Attending: STUDENT IN AN ORGANIZED HEALTH CARE EDUCATION/TRAINING PROGRAM
Payer: COMMERCIAL

## 2021-05-24 ENCOUNTER — APPOINTMENT (OUTPATIENT)
Dept: PHYSICAL THERAPY | Facility: CLINIC | Age: 71
DRG: 003 | End: 2021-05-24
Attending: NEUROLOGICAL SURGERY
Payer: COMMERCIAL

## 2021-05-24 LAB
ABO + RH BLD: NORMAL
ABO + RH BLD: NORMAL
ANION GAP SERPL CALCULATED.3IONS-SCNC: 7 MMOL/L (ref 3–14)
BLD GP AB SCN SERPL QL: NORMAL
BLOOD BANK CMNT PATIENT-IMP: NORMAL
BUN SERPL-MCNC: 19 MG/DL (ref 7–30)
CALCIUM SERPL-MCNC: 8.4 MG/DL (ref 8.5–10.1)
CHLORIDE SERPL-SCNC: 109 MMOL/L (ref 94–109)
CO2 SERPL-SCNC: 25 MMOL/L (ref 20–32)
CREAT SERPL-MCNC: 0.61 MG/DL (ref 0.66–1.25)
ERYTHROCYTE [DISTWIDTH] IN BLOOD BY AUTOMATED COUNT: 12.6 % (ref 10–15)
GFR SERPL CREATININE-BSD FRML MDRD: >90 ML/MIN/{1.73_M2}
GLUCOSE BLDC GLUCOMTR-MCNC: 139 MG/DL (ref 70–99)
GLUCOSE BLDC GLUCOMTR-MCNC: 139 MG/DL (ref 70–99)
GLUCOSE BLDC GLUCOMTR-MCNC: 144 MG/DL (ref 70–99)
GLUCOSE BLDC GLUCOMTR-MCNC: 153 MG/DL (ref 70–99)
GLUCOSE BLDC GLUCOMTR-MCNC: 157 MG/DL (ref 70–99)
GLUCOSE BLDC GLUCOMTR-MCNC: 162 MG/DL (ref 70–99)
GLUCOSE BLDC GLUCOMTR-MCNC: 172 MG/DL (ref 70–99)
GLUCOSE SERPL-MCNC: 146 MG/DL (ref 70–99)
HCT VFR BLD AUTO: 30.6 % (ref 40–53)
HGB BLD-MCNC: 10.1 G/DL (ref 13.3–17.7)
INTERPRETATION ECG - MUSE: NORMAL
MAGNESIUM SERPL-MCNC: 2.3 MG/DL (ref 1.6–2.3)
MCH RBC QN AUTO: 30.6 PG (ref 26.5–33)
MCHC RBC AUTO-ENTMCNC: 33 G/DL (ref 31.5–36.5)
MCV RBC AUTO: 93 FL (ref 78–100)
PHOSPHATE SERPL-MCNC: 2.9 MG/DL (ref 2.5–4.5)
PLATELET # BLD AUTO: 352 10E9/L (ref 150–450)
POTASSIUM SERPL-SCNC: 3.4 MMOL/L (ref 3.4–5.3)
POTASSIUM SERPL-SCNC: 3.6 MMOL/L (ref 3.4–5.3)
POTASSIUM SERPL-SCNC: 3.9 MMOL/L (ref 3.4–5.3)
RBC # BLD AUTO: 3.3 10E12/L (ref 4.4–5.9)
SODIUM SERPL-SCNC: 139 MMOL/L (ref 133–144)
SODIUM SERPL-SCNC: 141 MMOL/L (ref 133–144)
SODIUM SERPL-SCNC: 141 MMOL/L (ref 133–144)
SPECIMEN EXP DATE BLD: NORMAL
WBC # BLD AUTO: 19.8 10E9/L (ref 4–11)

## 2021-05-24 PROCEDURE — 250N000009 HC RX 250: Performed by: STUDENT IN AN ORGANIZED HEALTH CARE EDUCATION/TRAINING PROGRAM

## 2021-05-24 PROCEDURE — 250N000013 HC RX MED GY IP 250 OP 250 PS 637: Performed by: STUDENT IN AN ORGANIZED HEALTH CARE EDUCATION/TRAINING PROGRAM

## 2021-05-24 PROCEDURE — 86850 RBC ANTIBODY SCREEN: CPT | Performed by: STUDENT IN AN ORGANIZED HEALTH CARE EDUCATION/TRAINING PROGRAM

## 2021-05-24 PROCEDURE — 84295 ASSAY OF SERUM SODIUM: CPT | Performed by: STUDENT IN AN ORGANIZED HEALTH CARE EDUCATION/TRAINING PROGRAM

## 2021-05-24 PROCEDURE — 94003 VENT MGMT INPAT SUBQ DAY: CPT

## 2021-05-24 PROCEDURE — 250N000011 HC RX IP 250 OP 636: Performed by: STUDENT IN AN ORGANIZED HEALTH CARE EDUCATION/TRAINING PROGRAM

## 2021-05-24 PROCEDURE — 258N000003 HC RX IP 258 OP 636: Performed by: NURSE PRACTITIONER

## 2021-05-24 PROCEDURE — 99291 CRITICAL CARE FIRST HOUR: CPT | Mod: GC | Performed by: PSYCHIATRY & NEUROLOGY

## 2021-05-24 PROCEDURE — 200N000002 HC R&B ICU UMMC

## 2021-05-24 PROCEDURE — 999N000076 HC STATISTIC ICP MONITORING

## 2021-05-24 PROCEDURE — 83735 ASSAY OF MAGNESIUM: CPT | Performed by: STUDENT IN AN ORGANIZED HEALTH CARE EDUCATION/TRAINING PROGRAM

## 2021-05-24 PROCEDURE — 258N000003 HC RX IP 258 OP 636: Performed by: STUDENT IN AN ORGANIZED HEALTH CARE EDUCATION/TRAINING PROGRAM

## 2021-05-24 PROCEDURE — 93005 ELECTROCARDIOGRAM TRACING: CPT

## 2021-05-24 PROCEDURE — 93886 INTRACRANIAL COMPLETE STUDY: CPT | Mod: 26 | Performed by: RADIOLOGY

## 2021-05-24 PROCEDURE — 97530 THERAPEUTIC ACTIVITIES: CPT | Mod: GP

## 2021-05-24 PROCEDURE — 84100 ASSAY OF PHOSPHORUS: CPT | Performed by: STUDENT IN AN ORGANIZED HEALTH CARE EDUCATION/TRAINING PROGRAM

## 2021-05-24 PROCEDURE — 250N000011 HC RX IP 250 OP 636: Performed by: NURSE PRACTITIONER

## 2021-05-24 PROCEDURE — 250N000013 HC RX MED GY IP 250 OP 250 PS 637: Performed by: NEUROLOGICAL SURGERY

## 2021-05-24 PROCEDURE — 999N001017 HC STATISTIC GLUCOSE BY METER IP

## 2021-05-24 PROCEDURE — 94681 O2 UPTK CO2 OUTP % O2 XTRC: CPT

## 2021-05-24 PROCEDURE — 93886 INTRACRANIAL COMPLETE STUDY: CPT

## 2021-05-24 PROCEDURE — 999N000157 HC STATISTIC RCP TIME EA 10 MIN

## 2021-05-24 PROCEDURE — 80048 BASIC METABOLIC PNL TOTAL CA: CPT | Performed by: STUDENT IN AN ORGANIZED HEALTH CARE EDUCATION/TRAINING PROGRAM

## 2021-05-24 PROCEDURE — 85027 COMPLETE CBC AUTOMATED: CPT | Performed by: STUDENT IN AN ORGANIZED HEALTH CARE EDUCATION/TRAINING PROGRAM

## 2021-05-24 PROCEDURE — 84132 ASSAY OF SERUM POTASSIUM: CPT | Performed by: STUDENT IN AN ORGANIZED HEALTH CARE EDUCATION/TRAINING PROGRAM

## 2021-05-24 PROCEDURE — 250N000009 HC RX 250: Performed by: NURSE PRACTITIONER

## 2021-05-24 PROCEDURE — 93010 ELECTROCARDIOGRAM REPORT: CPT | Performed by: INTERNAL MEDICINE

## 2021-05-24 PROCEDURE — 250N000013 HC RX MED GY IP 250 OP 250 PS 637: Performed by: NURSE PRACTITIONER

## 2021-05-24 PROCEDURE — 84132 ASSAY OF SERUM POTASSIUM: CPT | Performed by: NEUROLOGICAL SURGERY

## 2021-05-24 PROCEDURE — 86900 BLOOD TYPING SEROLOGIC ABO: CPT | Performed by: STUDENT IN AN ORGANIZED HEALTH CARE EDUCATION/TRAINING PROGRAM

## 2021-05-24 PROCEDURE — 86901 BLOOD TYPING SEROLOGIC RH(D): CPT | Performed by: STUDENT IN AN ORGANIZED HEALTH CARE EDUCATION/TRAINING PROGRAM

## 2021-05-24 RX ORDER — POTASSIUM CHLORIDE 1.5 G/1.58G
40 POWDER, FOR SOLUTION ORAL ONCE
Status: COMPLETED | OUTPATIENT
Start: 2021-05-24 | End: 2021-05-24

## 2021-05-24 RX ADMIN — Medication 15 ML: at 08:04

## 2021-05-24 RX ADMIN — NIMODIPINE 60 MG: 30 SOLUTION ORAL at 03:37

## 2021-05-24 RX ADMIN — PIPERACILLIN SODIUM AND TAZOBACTAM SODIUM 4.5 G: 4; .5 INJECTION, POWDER, LYOPHILIZED, FOR SOLUTION INTRAVENOUS at 16:25

## 2021-05-24 RX ADMIN — PIPERACILLIN SODIUM AND TAZOBACTAM SODIUM 4.5 G: 4; .5 INJECTION, POWDER, LYOPHILIZED, FOR SOLUTION INTRAVENOUS at 22:09

## 2021-05-24 RX ADMIN — NIMODIPINE 60 MG: 30 SOLUTION ORAL at 08:03

## 2021-05-24 RX ADMIN — NIMODIPINE 60 MG: 30 SOLUTION ORAL at 16:24

## 2021-05-24 RX ADMIN — Medication 1 PACKET: at 08:04

## 2021-05-24 RX ADMIN — GUAIFENESIN 10 ML: 100 SOLUTION ORAL at 22:16

## 2021-05-24 RX ADMIN — FENTANYL CITRATE 25 MCG: 50 INJECTION, SOLUTION INTRAMUSCULAR; INTRAVENOUS at 13:30

## 2021-05-24 RX ADMIN — POTASSIUM CHLORIDE 40 MEQ: 1.5 POWDER, FOR SOLUTION ORAL at 05:43

## 2021-05-24 RX ADMIN — PIPERACILLIN SODIUM AND TAZOBACTAM SODIUM 4.5 G: 4; .5 INJECTION, POWDER, LYOPHILIZED, FOR SOLUTION INTRAVENOUS at 11:17

## 2021-05-24 RX ADMIN — DOXAZOSIN 1 MG: 1 TABLET ORAL at 08:04

## 2021-05-24 RX ADMIN — GUAIFENESIN 10 ML: 100 SOLUTION ORAL at 11:17

## 2021-05-24 RX ADMIN — DEXMEDETOMIDINE 0.9 MCG/KG/HR: 100 INJECTION, SOLUTION, CONCENTRATE INTRAVENOUS at 20:03

## 2021-05-24 RX ADMIN — Medication 10 ML: at 08:03

## 2021-05-24 RX ADMIN — DEXMEDETOMIDINE 0.7 MCG/KG/HR: 100 INJECTION, SOLUTION, CONCENTRATE INTRAVENOUS at 04:36

## 2021-05-24 RX ADMIN — NIMODIPINE 60 MG: 30 SOLUTION ORAL at 23:50

## 2021-05-24 RX ADMIN — Medication 10 ML: at 12:40

## 2021-05-24 RX ADMIN — QUETIAPINE 50 MG: 50 TABLET ORAL at 08:04

## 2021-05-24 RX ADMIN — FENTANYL CITRATE 25 MCG: 50 INJECTION, SOLUTION INTRAMUSCULAR; INTRAVENOUS at 10:42

## 2021-05-24 RX ADMIN — FAMOTIDINE 20 MG: 20 TABLET ORAL at 20:04

## 2021-05-24 RX ADMIN — NIMODIPINE 60 MG: 30 SOLUTION ORAL at 00:21

## 2021-05-24 RX ADMIN — OXYCODONE HYDROCHLORIDE 5 MG: 5 TABLET ORAL at 13:12

## 2021-05-24 RX ADMIN — INSULIN ASPART 1 UNITS: 100 INJECTION, SOLUTION INTRAVENOUS; SUBCUTANEOUS at 00:27

## 2021-05-24 RX ADMIN — ACETAMINOPHEN 650 MG: 325 TABLET, FILM COATED ORAL at 03:32

## 2021-05-24 RX ADMIN — Medication 10 ML: at 20:04

## 2021-05-24 RX ADMIN — HEPARIN SODIUM 5000 UNITS: 5000 INJECTION, SOLUTION INTRAVENOUS; SUBCUTANEOUS at 08:04

## 2021-05-24 RX ADMIN — DEXMEDETOMIDINE 0.7 MCG/KG/HR: 100 INJECTION, SOLUTION, CONCENTRATE INTRAVENOUS at 12:39

## 2021-05-24 RX ADMIN — FAMOTIDINE 20 MG: 20 TABLET ORAL at 08:04

## 2021-05-24 RX ADMIN — FENTANYL CITRATE 50 MCG: 50 INJECTION, SOLUTION INTRAMUSCULAR; INTRAVENOUS at 17:04

## 2021-05-24 RX ADMIN — Medication 10 ML: at 03:37

## 2021-05-24 RX ADMIN — INSULIN ASPART 1 UNITS: 100 INJECTION, SOLUTION INTRAVENOUS; SUBCUTANEOUS at 08:03

## 2021-05-24 RX ADMIN — NIMODIPINE 60 MG: 30 SOLUTION ORAL at 20:04

## 2021-05-24 RX ADMIN — GUAIFENESIN 10 ML: 100 SOLUTION ORAL at 16:24

## 2021-05-24 RX ADMIN — PIPERACILLIN SODIUM AND TAZOBACTAM SODIUM 4.5 G: 4; .5 INJECTION, POWDER, LYOPHILIZED, FOR SOLUTION INTRAVENOUS at 03:42

## 2021-05-24 RX ADMIN — POTASSIUM CHLORIDE 20 MEQ: 1.5 POWDER, FOR SOLUTION ORAL at 11:17

## 2021-05-24 RX ADMIN — Medication 10 ML: at 23:50

## 2021-05-24 RX ADMIN — Medication 1 PACKET: at 20:20

## 2021-05-24 RX ADMIN — GUAIFENESIN 10 ML: 100 SOLUTION ORAL at 03:42

## 2021-05-24 RX ADMIN — HEPARIN SODIUM 5000 UNITS: 5000 INJECTION, SOLUTION INTRAVENOUS; SUBCUTANEOUS at 23:49

## 2021-05-24 RX ADMIN — POTASSIUM CHLORIDE 20 MEQ: 1.5 POWDER, FOR SOLUTION ORAL at 20:04

## 2021-05-24 RX ADMIN — FENTANYL CITRATE 25 MCG: 50 INJECTION, SOLUTION INTRAMUSCULAR; INTRAVENOUS at 03:32

## 2021-05-24 RX ADMIN — NIMODIPINE 60 MG: 30 SOLUTION ORAL at 12:40

## 2021-05-24 RX ADMIN — HEPARIN SODIUM 5000 UNITS: 5000 INJECTION, SOLUTION INTRAVENOUS; SUBCUTANEOUS at 00:21

## 2021-05-24 RX ADMIN — Medication 1 PACKET: at 13:12

## 2021-05-24 RX ADMIN — INSULIN ASPART 1 UNITS: 100 INJECTION, SOLUTION INTRAVENOUS; SUBCUTANEOUS at 20:27

## 2021-05-24 RX ADMIN — QUETIAPINE 50 MG: 50 TABLET ORAL at 20:04

## 2021-05-24 RX ADMIN — HEPARIN SODIUM 5000 UNITS: 5000 INJECTION, SOLUTION INTRAVENOUS; SUBCUTANEOUS at 16:25

## 2021-05-24 RX ADMIN — Medication 10 ML: at 16:25

## 2021-05-24 RX ADMIN — FENTANYL CITRATE 50 MCG: 50 INJECTION, SOLUTION INTRAMUSCULAR; INTRAVENOUS at 22:09

## 2021-05-24 RX ADMIN — INSULIN ASPART 2 UNITS: 100 INJECTION, SOLUTION INTRAVENOUS; SUBCUTANEOUS at 23:49

## 2021-05-24 RX ADMIN — INSULIN ASPART 1 UNITS: 100 INJECTION, SOLUTION INTRAVENOUS; SUBCUTANEOUS at 12:39

## 2021-05-24 RX ADMIN — Medication 10 ML: at 00:21

## 2021-05-24 ASSESSMENT — ACTIVITIES OF DAILY LIVING (ADL)
ADLS_ACUITY_SCORE: 21

## 2021-05-24 ASSESSMENT — VISUAL ACUITY
OU: OTHER (SEE COMMENT)
OU: NOT TESTABLE
OU: OTHER (SEE COMMENT)

## 2021-05-24 ASSESSMENT — MIFFLIN-ST. JEOR: SCORE: 1500.13

## 2021-05-24 NOTE — PROGRESS NOTES
CLINICAL NUTRITION SERVICES - BRIEF NOTE      Nutrition Prescription    Recommendations already ordered by Registered Dietitian (RD):  --Continue TF as ordered.       --GOAL: Osmolite 1.5 Cristi @ goal of 60ml/hr (1440ml/day) will provide: 2160 kcals (31 kcals/kg), 90 g PRO (1.3 gm/kg), 1097 ml free H20, 293 g CHO, and 0 g fiber daily.             --Additional 3 packets Prosource = 123 gm PRO (1.8 gm/kg) and 2240 kcals (32 kcals, 84% of MREE)   --Will discontinue Certavite (pt is meeting >100% of vitamin/mineral needs and solution-based medications can worsen loose stools).      Future/Additional Recommendations:  --Weight trends and need to increase TF regimen.   --Stool trends.  --Repeat metabolic cart study before follow up.     *Please see full assessment note from 5/21/2021    New Findings:  Ordered metabolic cart study. Also per RN, pt having significant watery stool output now with rectal tube placed. Will monitor, as stool output has been 0-4 BMs/day. Weight stable but confounded by fluid (edema 1-3+). Pt was agitated this morning, which could affect results of metabolic cart study.     Obtained metabolic cart study 5/24 @ 08:25 with the following results: MREE = 2674 kcals/day (equiv to 39 kcal/kg/day) with RQ = 0.87.  Pt received 1380 ml of TF + 3 Prosource in 24 hours preceding the study providing 2190 kcals (82 % MREE).  RQ is within physiologic range; RQ is logical given provisions received prior to study.  Would aim energy intakes minimally at % of this MREE (equiv to ~31-39 kcal/kg/day).    Dosing Weight: 69 kg admission weight     ASSESSED NUTRITION NEEDS   MREE 5/24: 2674 kcals   NEW Estimated Energy Needs: 9789-3132 kcals/day (~31-39 kcals/kg or % MREE)   Justification: Increased needs and Underweight   Estimated Protein Needs: 103-138 grams protein/day (1.5 - 2 grams of pro/kg)   Justification: Increased needs and Repletion   Estimated Fluid Needs: (1 mL/kcal)   Justification: Maintenance  and Per provider pending fluid status     Interventions  Collaboration with other providers - rounds   Metabolic cart study    RD to follow per protocol.    Penelope Garcia MS, RD, LD, [facility]  Neuro ICU  ASCOM [phone]  Pager: [phone]

## 2021-05-24 NOTE — PROGRESS NOTES
Neuroscience Intensive Care Progress Note  2021    Problem List  1.  Aneurysmal subarachnoid hemorrhage, HH 5, MF 4, bleed day 5/15  2.  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  Hydrocephalus status post EVD 5/15  4.  Hypertension  5.  Acute hypoxic respiratory failure  6.  Hypernatremia  7.  Hyperchloremia  8.  Leukocytosis  9.  Normocytic anemia    24 hour events:  - Tamx 101.7  - Sputum cx grew pseudomonas, abx was switched to Zosyn  - Rectal tube was placed this Am due to diarrhea    Temp: 99  F (37.2  C) Temp  Min: 99  F (37.2  C)  Max: 102  F (38.9  C)  Resp: 20 Resp  Min: 15  Max: 21  SpO2: 96 % SpO2  Min: 93 %  Max: 98 %  Pulse: 69 Pulse  Min: 59  Max: 93    No data recorded  BP: (!) 146/87   Systolic (24hrs), Av , Min:138 , Max:169   Diastolic (24hrs), Av, Min:81, Max:99      Ventilator Settings  Ventilation Mode: CPAP/PS  (Continuous positive airway pressure with Pressure Support)  FiO2 (%): 30 %  PEEP (cm H2O): 5 cmH2O  Pressure Support (cm H2O): 7 cmH2O  Oxygen Concentration (%): 30 %  Resp: 20        Intake/Output Summary (Last 24 hours) at 2021 0816  Last data filed at 2021 0700  Gross per 24 hour   Intake 4123.19 ml   Output 4774 ml   Net -650.81 ml           Current Medications:    doxazosin  1 mg Oral or Feeding Tube Daily     famotidine  20 mg Oral or Feeding Tube BID     guaiFENesin  10 mL Oral or Feeding Tube Q6H     heparin ANTICOAGULANT  5,000 Units Subcutaneous Q8H     insulin aspart  1-12 Units Subcutaneous Q4H     multivitamins w/minerals  15 mL Per Feeding Tube Daily     niMODipine  60 mg Oral or Feeding Tube Q4H MARAH    And     sodium chloride 0.9 %  10 mL Oral or Feeding Tube Q4H MARAH     piperacillin-tazobactam  4.5 g Intravenous Q6H     polyethylene glycol  17 g Oral or Feeding Tube Daily     potassium chloride  20 mEq Oral or Feeding Tube BID     protein modular  1 packet Per Feeding Tube TID     QUEtiapine  50 mg Oral or Feeding Tube BID      "senna-docusate  1 tablet Oral or Feeding Tube BID    Or     senna-docusate  2 tablet Oral or Feeding Tube BID       PRN Medications:  acetaminophen, bisacodyl, glucose **OR** dextrose **OR** glucagon, fentaNYL, hydrALAZINE, labetalol, naloxone **OR** naloxone **OR** naloxone **OR** naloxone, ondansetron **OR** ondansetron, oxyCODONE, prochlorperazine **OR** prochlorperazine **OR** prochlorperazine, QUEtiapine, sodium chloride    Infusions:    dexmedetomidine 0.6 mcg/kg/hr (05/24/21 0623)       No Known Allergies    Physical Examination:  Vitals: BP (!) 146/87   Pulse 69   Temp 99  F (37.2  C)   Resp 20   Ht 1.803 m (5' 11\")   Wt 70.8 kg (156 lb 1.4 oz)   SpO2 96%   BMI 21.77 kg/m    General: Adult male patient, lying in bed, NAD  HEENT: Normocephalic/Atraumatic, no icterus, Oral cavity/Oropharynx pink and moist  Cardiac: RRR  Chest: No SOB, pattern regular, unlabored, expansion symmetric, no retractions or use of accessory muscles, assisted mechanically   Abdomen: Soft, bowel sounds present  Extremities: Warm, no edema  Skin: No rash or lesion   Psych: Restless, agitation   Neuro:  Mental status: Sedated, intubated, able to track examiner, intermittently follows commands  Cranial nerves: PERRL, conjugate gaze, pupils +4 round and reactive, cough and gag intact with deep suctioning.   Motor: Normal bulk and tone. No abnormal movements. 3/5 strength in bilateral upper and lower extremities, moves all limbs spontaneously, although RUE does not withdraw to noxious stimulation.   Sensory: Withdraws to noxious stimuli in LUE, LLE, RLE.  Coordination: KWAME, deferred.  Gait: KWAME, deferred.      Imaging:  I personally reviewed on labs and imaging in EMR.      Assessment/Plan  #Subarachnoid hemorrhage (MF4, HH5), aneurysmal; PBD#9  #Ruptured R PCOM aneurysm s/p clipping  Cerebral angiogram on 5/15 with confirmation of rutpure  POD#9 for R craniotomy and clipping of PCOM aneurysm  - Neuro checks q2h  - SBP < 200  - " Nimodipine 60 mg q4h  - Daily TCD  - HOB > 30 degrees  - Normonatremia; allowing hypernatremia  - Normothermia, Euglycemia     #IVH with subsequent hydrocephalus   #Brain compression with midline shift R>L  EVD placed 5/15  - EVD @ 0' open, ICP 0-7, drained 204cc till MN     #Agitation  - Seroquel 50 mg BID  - Seroquel 12.5 mg BID PRN  - Precedex gtt        #Analgesics  - Tylenol 650 mg q4h PRN   - Fentanyl bumps 25-50 mcg q2h PRN  - Oxycodone 5-10 mg q4h PRN      CV:  #HTN  ECHO completed on 5/17; EF 60-65%  - SBP < 200  - Holding PTA Lisinopril  - Hydralazine and Labetalol PRN  -  5/24     Pulm:  #Acute hypoxic respiratory failure  Re-intubated 5/19  - PST as able  - Pulmonary hygiene    - CXR tommorow  - Guaifenesin q6h    - Saline 3% nebulizer daily PRN  - Continuous pulse ox monitoring  - Maintain O2 saturations > 92%     GI/Nutrition:  #Severe malnutrition in the context of acute on chronic illness  - Protein modular 1 Pkt  - Multivitamins w/minerals     - Diet: TF's @ goal (60)  - NG   - Last BM--5/24  - rectal tube 5/24  Bowel regimen: Scheduled Senna-docusate and Miralax daily      Renal:  #Hyperchloremia; 111  -  ml q2h     #Hypokalemia; 3.1  - Potassium chloride 20 mEq BID    #Urinary retention  - Doxazosin 1 mg daily      - IV Fluids: None  - BMP daily  - Electrolyte replacement protocol in place     Endo:  #Hyperglycemia   - Hgb A1c 5.5  - Follow glucose levels     Heme:  #Leukocytosis  #Normocyctic anemia  - CBC daily     ID:  #Aspiration pneumonia  #Leukocytosis  - 5/19 Sputum culture with heavy growth Klebsiella oxytoca; Heavy growth Staphylococcus epidermidis; Heavy growth beta hemolytic Streptococcus constellatus   - Sputum 5/22--with mod growth of Pseudomonas aeruginosa   - Ceftriaxone (5/21-5/23) was switched to Zosyn (5/23-present)  - BC 5/19, 5/22 with NGTD  - Follow fever curve     PPX:  DVT Prophylaxis  - SCDs in place  - Heparin subcutaneous  GI Prophylaxis  - Famotidine 20 mg  BID      Lines/Tubes/Drains:  - PIV x 1  - L subclavian CVC/TL 5/15  - NG  - EVD  - Rectal tube  - Gutierrez catheter     Code Status: Full code     Dispo: ICU    The patient was seen and discussed with the attending, Dr. Casillas.    Tip Gonzalez MD  Neurology Resident PGY2  Pager [phone]

## 2021-05-24 NOTE — PLAN OF CARE
Major Shift Events:  Pt more awake at times, following commands consistently. Periods of agitation/restlessness. Dex @ 0.6. PRN fentanyl given x1. SBP < 200 without intervention. PS 7/5 Fi02 30%, tolerating well. Moderate thick creamy secretions. EVD @ 0 EAM, open. Tmax 102, MD notified, continue to monitor, use cold packs, tylenol, fan.   Plan: Trend fever. Wean dex as able.   For vital signs and complete assessments, please see documentation flowsheets.

## 2021-05-24 NOTE — PROVIDER NOTIFICATION
D. Attempt to get up out of bed this am with therapy, loose stool noted- on pressure support via vent. 30% fio2, precedex. At 0.6 , became very agitated constant stool noted( watery)- rr increased to 40- sats dropped to 80% from 97%- hr increased to 144 from 90 bpm.   A precedex increased - vent changed to assist control - rectal tube placed- DR notified of above.  I fentanyl given  o2 increased to 60% with sats increasing to 96%- MD aware.   P . Wean back to pre event settings if able,

## 2021-05-24 NOTE — PLAN OF CARE
Problem: Cerebral Tissue Perfusion Risk (Craniotomy/Craniectomy/Cranioplasty)  Goal: Effective Cerebral Tissue Perfusion  Outcome: Declining   D. On vent c-pap this am - resp distress noted MD aware- vent changed to ac/600/50%/5- dr aware- sats low 90s high 80s ,rr increased to 26-38- suction for thick secretions- MD aware  A vent changed- o2 increased with sedation increased- antibiotic changed per MD 5/23  I md aware will get chest x ray in am- increase sedation

## 2021-05-24 NOTE — PLAN OF CARE
Problem: Restraint for Non-Violent/Non-Self-Destructive Behavior  Goal: Prevent/Manage Potential Problems  Description: Maintain safety of patient and others during period of restraint.  Promote psychological and physical wellbeing.  Prevent injury to skin and involved body parts.  Promote nutrition, hydration, and elimination.  Outcome: No Change   D. With position change attempts to pull lines and tubes.   A dr aware of line pulling  I cont to restrain - remove when able.

## 2021-05-24 NOTE — PROGRESS NOTES
Care Management Follow Up    Length of Stay (days): 9    Expected Discharge Date: 05/24/21     Concerns to be Addressed:     Rehab   Patient plan of care discussed at interdisciplinary rounds: Yes        Additional Information:  SW met with Pt spouse Susy for psychosocial support and discharge planning. Pt is still critical in the ICU. The current rec is TCU. SW offered a medicare list and mentioned the plan can change in time pending Pt medical status. Pt spouse agreeable to rehab and appreciative of information. SW will continue to follow for psychosocial support, resources and advocate on behalf of the patient.    JOE Campos, [facility]  ICU    [facility]  Phone: [phone]  Pager: [phone]

## 2021-05-24 NOTE — PROGRESS NOTES
Neurosurgery progress note    S: no acute events overnight    O:  Temp:  [76.6  F (24.8  C)-100.8  F (38.2  C)] 99.1  F (37.3  C)  Pulse:  [] 63  Resp:  [17-30] 19  BP: ()/() 83/58  FiO2 (%):  [30 %-65 %] 40 %  SpO2:  [89 %-99 %] 97 %    Exam:  Incision: c/d/i, drain and EVD in place  Intubated and sedated  Localizes bilateral UE  Withdraws bilateral LE    Overnight, when off sedation FC    Imaging:  CTA/CTP 5/23  Impression:    1. Head CTA demonstrates right posterior communicating artery aneurysm surgical clipping. No residual aneurysm filling or contrast extravasation. No stenosis of the major intracranial arteries.   2. Neck CTA demonstrates no stenosis of the major cervical arteries.  3. Partially visualized right lung consolidation and patchy right upper lobe ground glass opacities. Recommend correlation with chest x-ray.    Impression:   No change in small area of decreased CBV and CBF in the anterior right temporal lobe corresponding to region of known hemorrhage. No new perfusion abnormality.      ASSESSMENT: 70 year old man presented with AMS found to have aSAH with right supraclinoid aneurysm. mFS4 and HH5. GCS 5. S/p EVD placement, DCA,s/p aneurysm clipping on 5/16.     The following conditions are also present, complicating the patient's current management, as described in the Assessment and Plan:   mechanical ventilation on day of admission, intracerebral hematoma, brain compression, acute respiratory failure, coma, based on current GCS of 5 and cerebral aneurysm rupture with hunt sims scale 5 modified lowery 4     Hypokalemia   Severe malnutrition in the context of acute on chronic illness  Respiratory failure  Pneumonia     RECOMMENDATIONS:  - EVD: raise to 5 open  - Q1hr neuro exam  - SBP<200  - HOB > 30 degrees  - follow-up clx  -- Ceftriaxone for PNA  -- pan-clx again  - Continuous cardiac monitoring while in ICU   - Nimodipine  - daily TCDs  - Glucose < 180  - Continue glucose  checks  - Platelets > 100,000  - INR < 1.5  - Hemoglobin > 8  - DVT: SCDs while in bed  - Disposition: 4A  - PT/OT consulted        Samuel Nolasco MD, PhD  Neurosurgery Resident PGY-2    Please contact neurosurgery resident on call with questions.    Dial * * [phone], enter [phone] when prompted.

## 2021-05-25 ENCOUNTER — APPOINTMENT (OUTPATIENT)
Dept: CT IMAGING | Facility: CLINIC | Age: 71
DRG: 003 | End: 2021-05-25
Attending: STUDENT IN AN ORGANIZED HEALTH CARE EDUCATION/TRAINING PROGRAM
Payer: COMMERCIAL

## 2021-05-25 ENCOUNTER — APPOINTMENT (OUTPATIENT)
Dept: GENERAL RADIOLOGY | Facility: CLINIC | Age: 71
DRG: 003 | End: 2021-05-25
Attending: NEUROLOGICAL SURGERY
Payer: COMMERCIAL

## 2021-05-25 ENCOUNTER — APPOINTMENT (OUTPATIENT)
Dept: ULTRASOUND IMAGING | Facility: CLINIC | Age: 71
DRG: 003 | End: 2021-05-25
Attending: STUDENT IN AN ORGANIZED HEALTH CARE EDUCATION/TRAINING PROGRAM
Payer: COMMERCIAL

## 2021-05-25 LAB
ALBUMIN UR-MCNC: 20 MG/DL
ANION GAP SERPL CALCULATED.3IONS-SCNC: 8 MMOL/L (ref 3–14)
APPEARANCE CSF: ABNORMAL
APPEARANCE UR: CLEAR
BACTERIA SPEC CULT: ABNORMAL
BACTERIA SPEC CULT: NO GROWTH
BACTERIA SPEC CULT: NO GROWTH
BILIRUB UR QL STRIP: NEGATIVE
BUN SERPL-MCNC: 25 MG/DL (ref 7–30)
CALCIUM SERPL-MCNC: 8.2 MG/DL (ref 8.5–10.1)
CHLORIDE SERPL-SCNC: 108 MMOL/L (ref 94–109)
CO2 SERPL-SCNC: 24 MMOL/L (ref 20–32)
COLOR CSF: ABNORMAL
COLOR UR AUTO: YELLOW
CREAT SERPL-MCNC: 0.74 MG/DL (ref 0.66–1.25)
CRP SERPL-MCNC: 70 MG/L (ref 0–8)
ERYTHROCYTE [DISTWIDTH] IN BLOOD BY AUTOMATED COUNT: 12.7 % (ref 10–15)
ERYTHROCYTE [SEDIMENTATION RATE] IN BLOOD BY WESTERGREN METHOD: 103 MM/H (ref 0–20)
GFR SERPL CREATININE-BSD FRML MDRD: >90 ML/MIN/{1.73_M2}
GLUCOSE BLDC GLUCOMTR-MCNC: 124 MG/DL (ref 70–99)
GLUCOSE BLDC GLUCOMTR-MCNC: 129 MG/DL (ref 70–99)
GLUCOSE BLDC GLUCOMTR-MCNC: 137 MG/DL (ref 70–99)
GLUCOSE BLDC GLUCOMTR-MCNC: 144 MG/DL (ref 70–99)
GLUCOSE BLDC GLUCOMTR-MCNC: 168 MG/DL (ref 70–99)
GLUCOSE CSF-MCNC: 90 MG/DL (ref 40–70)
GLUCOSE SERPL-MCNC: 128 MG/DL (ref 70–99)
GLUCOSE UR STRIP-MCNC: NEGATIVE MG/DL
GRAM STN SPEC: ABNORMAL
GRAM STN SPEC: NORMAL
HCT VFR BLD AUTO: 27.4 % (ref 40–53)
HGB BLD-MCNC: 9 G/DL (ref 13.3–17.7)
HGB UR QL STRIP: NEGATIVE
KETONES UR STRIP-MCNC: NEGATIVE MG/DL
LEUKOCYTE ESTERASE UR QL STRIP: NEGATIVE
Lab: ABNORMAL
Lab: ABNORMAL
MAGNESIUM SERPL-MCNC: 2.2 MG/DL (ref 1.6–2.3)
MCH RBC QN AUTO: 30.6 PG (ref 26.5–33)
MCHC RBC AUTO-ENTMCNC: 32.8 G/DL (ref 31.5–36.5)
MCV RBC AUTO: 93 FL (ref 78–100)
MUCOUS THREADS #/AREA URNS LPF: PRESENT /LPF
NITRATE UR QL: NEGATIVE
PH UR STRIP: 5 PH (ref 5–7)
PHOSPHATE SERPL-MCNC: 3.4 MG/DL (ref 2.5–4.5)
PLATELET # BLD AUTO: 350 10E9/L (ref 150–450)
POTASSIUM SERPL-SCNC: 3.8 MMOL/L (ref 3.4–5.3)
PROCALCITONIN SERPL-MCNC: 0.24 NG/ML
PROT CSF-MCNC: 295 MG/DL (ref 15–60)
RBC # BLD AUTO: 2.94 10E12/L (ref 4.4–5.9)
RBC # CSF MANUAL: ABNORMAL /UL (ref 0–2)
RBC #/AREA URNS AUTO: 1 /HPF (ref 0–2)
SODIUM SERPL-SCNC: 140 MMOL/L (ref 133–144)
SODIUM SERPL-SCNC: 140 MMOL/L (ref 133–144)
SODIUM SERPL-SCNC: 141 MMOL/L (ref 133–144)
SOURCE: ABNORMAL
SP GR UR STRIP: 1.03 (ref 1–1.03)
SPECIMEN SOURCE: ABNORMAL
SPECIMEN SOURCE: ABNORMAL
SPECIMEN SOURCE: NORMAL
SQUAMOUS #/AREA URNS AUTO: <1 /HPF (ref 0–1)
TUBE # CSF: 1 #
UROBILINOGEN UR STRIP-MCNC: NORMAL MG/DL (ref 0–2)
WBC # BLD AUTO: 20.7 10E9/L (ref 4–11)
WBC # CSF MANUAL: 4 /UL (ref 0–5)
WBC #/AREA URNS AUTO: 2 /HPF (ref 0–5)

## 2021-05-25 PROCEDURE — 87070 CULTURE OTHR SPECIMN AEROBIC: CPT | Performed by: STUDENT IN AN ORGANIZED HEALTH CARE EDUCATION/TRAINING PROGRAM

## 2021-05-25 PROCEDURE — 84100 ASSAY OF PHOSPHORUS: CPT | Performed by: STUDENT IN AN ORGANIZED HEALTH CARE EDUCATION/TRAINING PROGRAM

## 2021-05-25 PROCEDURE — 250N000013 HC RX MED GY IP 250 OP 250 PS 637: Performed by: STUDENT IN AN ORGANIZED HEALTH CARE EDUCATION/TRAINING PROGRAM

## 2021-05-25 PROCEDURE — 258N000003 HC RX IP 258 OP 636: Performed by: STUDENT IN AN ORGANIZED HEALTH CARE EDUCATION/TRAINING PROGRAM

## 2021-05-25 PROCEDURE — 87077 CULTURE AEROBIC IDENTIFY: CPT | Performed by: STUDENT IN AN ORGANIZED HEALTH CARE EDUCATION/TRAINING PROGRAM

## 2021-05-25 PROCEDURE — 87186 SC STD MICRODIL/AGAR DIL: CPT | Performed by: STUDENT IN AN ORGANIZED HEALTH CARE EDUCATION/TRAINING PROGRAM

## 2021-05-25 PROCEDURE — 93886 INTRACRANIAL COMPLETE STUDY: CPT

## 2021-05-25 PROCEDURE — 250N000009 HC RX 250: Performed by: STUDENT IN AN ORGANIZED HEALTH CARE EDUCATION/TRAINING PROGRAM

## 2021-05-25 PROCEDURE — 83735 ASSAY OF MAGNESIUM: CPT | Performed by: STUDENT IN AN ORGANIZED HEALTH CARE EDUCATION/TRAINING PROGRAM

## 2021-05-25 PROCEDURE — 200N000002 HC R&B ICU UMMC

## 2021-05-25 PROCEDURE — 82945 GLUCOSE OTHER FLUID: CPT | Performed by: STUDENT IN AN ORGANIZED HEALTH CARE EDUCATION/TRAINING PROGRAM

## 2021-05-25 PROCEDURE — 71045 X-RAY EXAM CHEST 1 VIEW: CPT | Mod: 26 | Performed by: RADIOLOGY

## 2021-05-25 PROCEDURE — 70450 CT HEAD/BRAIN W/O DYE: CPT

## 2021-05-25 PROCEDURE — 250N000013 HC RX MED GY IP 250 OP 250 PS 637: Performed by: NEUROLOGICAL SURGERY

## 2021-05-25 PROCEDURE — 250N000013 HC RX MED GY IP 250 OP 250 PS 637: Performed by: NURSE PRACTITIONER

## 2021-05-25 PROCEDURE — 86140 C-REACTIVE PROTEIN: CPT | Performed by: STUDENT IN AN ORGANIZED HEALTH CARE EDUCATION/TRAINING PROGRAM

## 2021-05-25 PROCEDURE — 250N000011 HC RX IP 250 OP 636

## 2021-05-25 PROCEDURE — 999N000157 HC STATISTIC RCP TIME EA 10 MIN

## 2021-05-25 PROCEDURE — 0042T CT HEAD PERFUSION WITH CONTRAST: CPT

## 2021-05-25 PROCEDURE — 71045 X-RAY EXAM CHEST 1 VIEW: CPT

## 2021-05-25 PROCEDURE — 999N001017 HC STATISTIC GLUCOSE BY METER IP

## 2021-05-25 PROCEDURE — 87040 BLOOD CULTURE FOR BACTERIA: CPT | Performed by: NEUROLOGICAL SURGERY

## 2021-05-25 PROCEDURE — 250N000011 HC RX IP 250 OP 636: Performed by: STUDENT IN AN ORGANIZED HEALTH CARE EDUCATION/TRAINING PROGRAM

## 2021-05-25 PROCEDURE — 94003 VENT MGMT INPAT SUBQ DAY: CPT

## 2021-05-25 PROCEDURE — 84157 ASSAY OF PROTEIN OTHER: CPT | Performed by: STUDENT IN AN ORGANIZED HEALTH CARE EDUCATION/TRAINING PROGRAM

## 2021-05-25 PROCEDURE — 36415 COLL VENOUS BLD VENIPUNCTURE: CPT | Performed by: NEUROLOGICAL SURGERY

## 2021-05-25 PROCEDURE — 93886 INTRACRANIAL COMPLETE STUDY: CPT | Mod: 26 | Performed by: RADIOLOGY

## 2021-05-25 PROCEDURE — 0042T CT HEAD PERFUSION WITH CONTRAST: CPT | Mod: GC | Performed by: RADIOLOGY

## 2021-05-25 PROCEDURE — 81001 URINALYSIS AUTO W/SCOPE: CPT | Performed by: STUDENT IN AN ORGANIZED HEALTH CARE EDUCATION/TRAINING PROGRAM

## 2021-05-25 PROCEDURE — 87205 SMEAR GRAM STAIN: CPT | Performed by: STUDENT IN AN ORGANIZED HEALTH CARE EDUCATION/TRAINING PROGRAM

## 2021-05-25 PROCEDURE — 84145 PROCALCITONIN (PCT): CPT | Performed by: STUDENT IN AN ORGANIZED HEALTH CARE EDUCATION/TRAINING PROGRAM

## 2021-05-25 PROCEDURE — 80048 BASIC METABOLIC PNL TOTAL CA: CPT | Performed by: STUDENT IN AN ORGANIZED HEALTH CARE EDUCATION/TRAINING PROGRAM

## 2021-05-25 PROCEDURE — 99291 CRITICAL CARE FIRST HOUR: CPT | Mod: GC | Performed by: PSYCHIATRY & NEUROLOGY

## 2021-05-25 PROCEDURE — 250N000011 HC RX IP 250 OP 636: Performed by: NURSE PRACTITIONER

## 2021-05-25 PROCEDURE — 70496 CT ANGIOGRAPHY HEAD: CPT | Mod: 26 | Performed by: RADIOLOGY

## 2021-05-25 PROCEDURE — 84295 ASSAY OF SERUM SODIUM: CPT | Performed by: NEUROLOGICAL SURGERY

## 2021-05-25 PROCEDURE — 85652 RBC SED RATE AUTOMATED: CPT | Performed by: STUDENT IN AN ORGANIZED HEALTH CARE EDUCATION/TRAINING PROGRAM

## 2021-05-25 PROCEDURE — 89050 BODY FLUID CELL COUNT: CPT | Performed by: STUDENT IN AN ORGANIZED HEALTH CARE EDUCATION/TRAINING PROGRAM

## 2021-05-25 PROCEDURE — 85027 COMPLETE CBC AUTOMATED: CPT | Performed by: STUDENT IN AN ORGANIZED HEALTH CARE EDUCATION/TRAINING PROGRAM

## 2021-05-25 PROCEDURE — 70496 CT ANGIOGRAPHY HEAD: CPT

## 2021-05-25 PROCEDURE — 70450 CT HEAD/BRAIN W/O DYE: CPT | Mod: 26 | Performed by: RADIOLOGY

## 2021-05-25 PROCEDURE — 70498 CT ANGIOGRAPHY NECK: CPT | Mod: 26 | Performed by: RADIOLOGY

## 2021-05-25 PROCEDURE — 999N000185 HC STATISTIC TRANSPORT TIME EA 15 MIN

## 2021-05-25 PROCEDURE — 87075 CULTR BACTERIA EXCEPT BLOOD: CPT | Performed by: STUDENT IN AN ORGANIZED HEALTH CARE EDUCATION/TRAINING PROGRAM

## 2021-05-25 RX ORDER — POTASSIUM CHLORIDE 1.5 G/1.58G
20 POWDER, FOR SOLUTION ORAL ONCE
Status: COMPLETED | OUTPATIENT
Start: 2021-05-25 | End: 2021-05-25

## 2021-05-25 RX ORDER — IOPAMIDOL 755 MG/ML
75 INJECTION, SOLUTION INTRAVASCULAR ONCE
Status: COMPLETED | OUTPATIENT
Start: 2021-05-25 | End: 2021-05-25

## 2021-05-25 RX ADMIN — FENTANYL CITRATE 50 MCG: 50 INJECTION, SOLUTION INTRAMUSCULAR; INTRAVENOUS at 21:08

## 2021-05-25 RX ADMIN — DEXMEDETOMIDINE 1 MCG/KG/HR: 100 INJECTION, SOLUTION, CONCENTRATE INTRAVENOUS at 05:46

## 2021-05-25 RX ADMIN — POTASSIUM CHLORIDE 20 MEQ: 1.5 POWDER, FOR SOLUTION ORAL at 05:16

## 2021-05-25 RX ADMIN — HEPARIN SODIUM 5000 UNITS: 5000 INJECTION, SOLUTION INTRAVENOUS; SUBCUTANEOUS at 16:32

## 2021-05-25 RX ADMIN — NIMODIPINE 60 MG: 30 SOLUTION ORAL at 16:28

## 2021-05-25 RX ADMIN — Medication 1 PACKET: at 07:47

## 2021-05-25 RX ADMIN — QUETIAPINE 50 MG: 50 TABLET ORAL at 20:07

## 2021-05-25 RX ADMIN — Medication 10 ML: at 20:08

## 2021-05-25 RX ADMIN — FAMOTIDINE 20 MG: 20 TABLET ORAL at 07:46

## 2021-05-25 RX ADMIN — IOPAMIDOL 75 ML: 755 INJECTION, SOLUTION INTRAVENOUS at 11:27

## 2021-05-25 RX ADMIN — PIPERACILLIN SODIUM AND TAZOBACTAM SODIUM 4.5 G: 4; .5 INJECTION, POWDER, LYOPHILIZED, FOR SOLUTION INTRAVENOUS at 22:22

## 2021-05-25 RX ADMIN — Medication 1 PACKET: at 13:54

## 2021-05-25 RX ADMIN — NIMODIPINE 60 MG: 30 SOLUTION ORAL at 12:59

## 2021-05-25 RX ADMIN — INSULIN ASPART 1 UNITS: 100 INJECTION, SOLUTION INTRAVENOUS; SUBCUTANEOUS at 03:42

## 2021-05-25 RX ADMIN — QUETIAPINE 50 MG: 50 TABLET ORAL at 07:46

## 2021-05-25 RX ADMIN — PIPERACILLIN SODIUM AND TAZOBACTAM SODIUM 4.5 G: 4; .5 INJECTION, POWDER, LYOPHILIZED, FOR SOLUTION INTRAVENOUS at 16:23

## 2021-05-25 RX ADMIN — PIPERACILLIN SODIUM AND TAZOBACTAM SODIUM 4.5 G: 4; .5 INJECTION, POWDER, LYOPHILIZED, FOR SOLUTION INTRAVENOUS at 10:13

## 2021-05-25 RX ADMIN — FAMOTIDINE 20 MG: 20 TABLET ORAL at 20:08

## 2021-05-25 RX ADMIN — Medication 1 PACKET: at 20:08

## 2021-05-25 RX ADMIN — ACETAMINOPHEN 650 MG: 325 TABLET, FILM COATED ORAL at 10:13

## 2021-05-25 RX ADMIN — DOXAZOSIN 1 MG: 1 TABLET ORAL at 10:13

## 2021-05-25 RX ADMIN — HEPARIN SODIUM 5000 UNITS: 5000 INJECTION, SOLUTION INTRAVENOUS; SUBCUTANEOUS at 07:46

## 2021-05-25 RX ADMIN — INSULIN ASPART 2 UNITS: 100 INJECTION, SOLUTION INTRAVENOUS; SUBCUTANEOUS at 07:47

## 2021-05-25 RX ADMIN — Medication 10 ML: at 16:34

## 2021-05-25 RX ADMIN — DEXMEDETOMIDINE 1.2 MCG/KG/HR: 100 INJECTION, SOLUTION, CONCENTRATE INTRAVENOUS at 01:04

## 2021-05-25 RX ADMIN — NIMODIPINE 60 MG: 30 SOLUTION ORAL at 20:08

## 2021-05-25 RX ADMIN — NIMODIPINE 60 MG: 30 SOLUTION ORAL at 07:47

## 2021-05-25 RX ADMIN — PIPERACILLIN SODIUM AND TAZOBACTAM SODIUM 4.5 G: 4; .5 INJECTION, POWDER, LYOPHILIZED, FOR SOLUTION INTRAVENOUS at 03:42

## 2021-05-25 RX ADMIN — ACETAMINOPHEN 650 MG: 325 TABLET, FILM COATED ORAL at 20:07

## 2021-05-25 RX ADMIN — FENTANYL CITRATE 50 MCG: 50 INJECTION, SOLUTION INTRAMUSCULAR; INTRAVENOUS at 00:52

## 2021-05-25 RX ADMIN — POTASSIUM CHLORIDE 20 MEQ: 1.5 POWDER, FOR SOLUTION ORAL at 12:59

## 2021-05-25 RX ADMIN — GUAIFENESIN 10 ML: 100 SOLUTION ORAL at 03:43

## 2021-05-25 RX ADMIN — Medication 10 ML: at 07:57

## 2021-05-25 RX ADMIN — Medication 12.5 MG: at 00:23

## 2021-05-25 RX ADMIN — Medication 10 ML: at 13:06

## 2021-05-25 RX ADMIN — NIMODIPINE 60 MG: 30 SOLUTION ORAL at 03:42

## 2021-05-25 RX ADMIN — Medication 10 ML: at 03:42

## 2021-05-25 RX ADMIN — POTASSIUM CHLORIDE 20 MEQ: 1.5 POWDER, FOR SOLUTION ORAL at 20:08

## 2021-05-25 ASSESSMENT — VISUAL ACUITY
OU: OTHER (SEE COMMENT)

## 2021-05-25 ASSESSMENT — MIFFLIN-ST. JEOR: SCORE: 1489.13

## 2021-05-25 ASSESSMENT — ACTIVITIES OF DAILY LIVING (ADL)
ADLS_ACUITY_SCORE: 22

## 2021-05-25 NOTE — PLAN OF CARE
Major Shift Events: Pt's BP remains stable, no interventions required. Pt is tachycardic when restless, and intermittently bradycardic when very comfortable. Tmax 99.8. No Tylenol given. Remains on CMV settings overnight. Will intermittently desat to the low 80's, requires suction frequently. Continuing q2h neuros. When lessened Precedex gtt to 0.9, pt is following commands, opening eyes spontaneously, but very restless. Returned to 1.2 on Precedex gtt and pt is now calm and opening eyes to touch. Not following commands. Will attempt to wean precedex gtt again this AM. EVD with dark red output. ICPs low 0-8. TF remain at goal. Rectal tube in. Fournier with adequate output. Will trial removing fournier this AM. Had CXR and head CT this morning.    Plan: Monitor neuro exams. Pulmonary hygiene. Wean sedation as able. PS trial.    For vital signs and complete assessments, please see documentation flowsheets.

## 2021-05-25 NOTE — PROGRESS NOTES
Neurosurgery progress note    S: desated overnight; PEEP increased; ETT replaced in the AM due to displacement and inadequate ventilation    O:  Temp:  [98.6  F (37  C)-100.4  F (38  C)] 98.6  F (37  C)  Pulse:  [] 70  Resp:  [14-38] 14  BP: ()/() 84/59  FiO2 (%):  [30 %-100 %] 60 %  SpO2:  [84 %-100 %] 99 %    Exam:  Incision: c/d/i, drain and EVD in place  Intubated and sedated  FCx4; less movement LLE      Imaging:  No new imaging    Impression:   No change in small area of decreased CBV and CBF in the anterior right temporal lobe corresponding to region of known hemorrhage. No new perfusion abnormality.      ASSESSMENT: 70 year old man presented with AMS found to have aSAH with right supraclinoid aneurysm. mFS4 and HH5. GCS 5. S/p EVD placement, DCA,s/p aneurysm clipping on 5/16.     The following conditions are also present, complicating the patient's current management, as described in the Assessment and Plan:   mechanical ventilation on day of admission, intracerebral hematoma, brain compression, acute respiratory failure, coma, based on current GCS of 5 and cerebral aneurysm rupture with hunt sims scale 5 modified lowery 4     Hypokalemia   Severe malnutrition in the context of acute on chronic illness  Respiratory failure  Pneumonia     RECOMMENDATIONS:  - EVD: raise to 10 open  - Q1hr neuro exam  - SBP<200  - HOB > 30 degrees  - follow-up clx  -- Ceftriaxone for PNA  -- pan-clx again  - Continuous cardiac monitoring while in ICU   - Nimodipine  - daily TCDs  - Glucose < 180  - Continue glucose checks  - Platelets > 100,000  - INR < 1.5  - Hemoglobin > 8  - DVT: SCDs while in bed  - Disposition: 4A  - PT/OT consulted        Samuel Nolasco MD, PhD  Neurosurgery Resident PGY-2    Please contact neurosurgery resident on call with questions.    Dial * * [phone], enter [phone] when prompted.

## 2021-05-25 NOTE — PLAN OF CARE
OT 4A: Cancel and continue to HOLD.  PT following for ROM and mobility as able; however, pt continues to follow minimal commands and unable to purposefully engage in 2 therapies at this time. Will continue to monitor status

## 2021-05-25 NOTE — PROGRESS NOTES
Neuroscience Intensive Care Progress Note  2021    Problem List  1.  Aneurysmal subarachnoid hemorrhage, HH 5, MF 4, bleed day 5/15  2.  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  Hydrocephalus status post EVD 5/15  4.  Hypertension  5.  Acute hypoxic respiratory failure  6.  Hypernatremia  7.  Hyperchloremia  8.  Leukocytosis  9.  Normocytic anemia    AE  - No events overnight    Temp: 99.1  F (37.3  C) Temp  Min: 76.6  F (24.8  C)  Max: 100.8  F (38.2  C)  Resp: 19 Resp  Min: 17  Max: 30  SpO2: 97 % SpO2  Min: 89 %  Max: 99 %  Pulse: 63 Pulse  Min: 63  Max: 122    No data recorded  BP: (!) 83/58   Systolic (24hrs), Av , Min:83 , Max:215   Diastolic (24hrs), Av, Min:58, Max:120      Ventilator Settings  Ventilation Mode: CMV/AC  (Continuous Mandatory Ventilation/ Assist Control)  FiO2 (%): 40 %  Rate Set (breaths/minute): 14 breaths/min  Tidal Volume Set (mL): 600 mL  PEEP (cm H2O): 5 cmH2O  Pressure Support (cm H2O): 7 cmH2O  Oxygen Concentration (%): (S) 30 %  Peak Inspiratory Pressure (cm H2O) (Drager Marifer): 38  Resp: 19        Intake/Output Summary (Last 24 hours) at 2021 0816  Last data filed at 2021 0700  Gross per 24 hour   Intake 4123.19 ml   Output 4774 ml   Net -650.81 ml           Current Medications:    doxazosin  1 mg Oral or Feeding Tube Daily     famotidine  20 mg Oral or Feeding Tube BID     guaiFENesin  10 mL Oral or Feeding Tube Q6H     heparin ANTICOAGULANT  5,000 Units Subcutaneous Q8H     insulin aspart  1-12 Units Subcutaneous Q4H     niMODipine  60 mg Oral or Feeding Tube Q4H MARAH    And     sodium chloride 0.9 %  10 mL Oral or Feeding Tube Q4H MARAH     piperacillin-tazobactam  4.5 g Intravenous Q6H     polyethylene glycol  17 g Oral or Feeding Tube Daily     potassium chloride  20 mEq Oral or Feeding Tube BID     protein modular  1 packet Per Feeding Tube TID     QUEtiapine  50 mg Oral or Feeding Tube BID     senna-docusate  1 tablet Oral or Feeding  "Tube BID    Or     senna-docusate  2 tablet Oral or Feeding Tube BID       PRN Medications:  acetaminophen, bisacodyl, glucose **OR** dextrose **OR** glucagon, fentaNYL, hydrALAZINE, labetalol, naloxone **OR** naloxone **OR** naloxone **OR** naloxone, ondansetron **OR** ondansetron, oxyCODONE, prochlorperazine **OR** prochlorperazine **OR** prochlorperazine, QUEtiapine, sodium chloride    Infusions:    dexmedetomidine 1 mcg/kg/hr (05/25/21 0700)       No Known Allergies    Physical Examination:  Vitals: BP (!) 83/58   Pulse 63   Temp 99.1  F (37.3  C) (Axillary)   Resp 19   Ht 1.803 m (5' 11\")   Wt 69.7 kg (153 lb 10.6 oz)   SpO2 97%   BMI 21.43 kg/m    General: Adult male patient, lying in bed, NAD  HEENT: Normocephalic/Atraumatic, no icterus, Oral cavity/Oropharynx pink and moist  Cardiac: RRR  Chest: No SOB, pattern regular, unlabored, expansion symmetric, no retractions or use of accessory muscles, assisted mechanically   Abdomen: Soft, bowel sounds present  Extremities: Warm, no edema  Skin: No rash or lesion   Psych: Restless, agitation   Neuro:  Mental status: Sedated, intubated, able to track examiner, intermittently follows commands  Cranial nerves: PERRL, conjugate gaze, pupils +4 round and reactive, cough and gag intact with deep suctioning.   Motor: Normal bulk and tone. No abnormal movements. 3/5 strength in bilateral upper and lower extremities, moves all limbs spontaneously, although RUE does not withdraw to noxious stimulation.   Sensory: Withdraws to noxious stimuli in LUE, LLE, RLE.  Coordination: KWAME, deferred.  Gait: KWAME, deferred.      Imaging:  I personally reviewed on labs and imaging in EMR.      Assessment/Plan  #Subarachnoid hemorrhage (MF4, HH5), aneurysmal; PBD10  #Ruptured R PCOM aneurysm s/p clipping  Cerebral angiogram on 5/15 with confirmation of rutpure  POD10, R craniotomy and clipping of PCOM aneurysm  - Neuro checks q2h  - SBP < 200  - Nimodipine 60 mg q4h  - Daily TCD, " yesterday TCD wnl (L PCA normalized); elevated L BAYLEE   - HOB > 30 degrees;   - CTP- R temporal reduced CBV/CBF  - CTH this AM: stable, improvement in IVH  - Normonatremia; allowing hypernatremia  - Normothermia, Euglycemia  - CT-A, CT-P   - CSF sent     #IVH with subsequent hydrocephalus   #Brain compression with midline shift R>L  EVD placed 5/15  - EVD raised this AM to 5' open,   - Was at 0- ICP 0-8, drained 268 yesterday, 73cc since MN     #Agitation  - Seroquel 50 mg BID  - Seroquel 12.5 mg BID PRN  - Precedex gtt currently @ 1; wean as able       #Analgesics  - Tylenol 650 mg q4h PRN   - Fentanyl bumps 25-50 mcg q2h PRN  - Stop Oxycodone 5-10 mg q4h PRN     CV:  #HTN  ECHO completed on 5/17; EF 60-65%  - 110s-120s; 1 x SBP 83  - SBP < 200  - Holding PTA Lisinopril  - Hydralazine and Labetalol PRN  -  5/24     Pulm:  #Acute hypoxic respiratory failure  Re-intubated 5/19  - PST as able  - ABG  - Pulmonary hygiene     - CXR AM- RLL looks improved; overall still with some interstitial infiltrates  - Guaifenesin q6h - stop guaifenescin  - Saline 3% nebulizer daily PRN  - Continuous pulse ox monitoring  - Maintain O2 saturations > 92%    Ventilation Mode: CMV/AC  (Continuous Mandatory Ventilation/ Assist Control)  FiO2 (%): 40 %  Rate Set (breaths/minute): 14 breaths/min  Tidal Volume Set (mL): 600 mL  PEEP (cm H2O): 5 cmH2O  Pressure Support (cm H2O): 7 cmH2O  Oxygen Concentration (%): (S) 30 %  Peak Inspiratory Pressure (cm H2O) (Drager Marifer): 38  Resp: 19       GI/Nutrition:  #Severe malnutrition in the context of acute on chronic illness  - Protein modular 1 Pkt  - Multivitamins w/minerals  - Diet: TF's @ goal (60)  - NG   - continue rectal tube given high output    Bowel regimen: Scheduled Senna-docusate and Miralax daily      Renal:    I/o: even yesterday; Overall +5.8L; likely roughly net even given diaphoresis    #Hyperchloremia; 111  -  ml q2h     #Hypokalemia; 3.1  - Potassium chloride 20  mEq BID    #Urinary retention  - Doxazosin 1 mg daily      - IV Fluids: None  - BMP daily  - Electrolyte replacement protocol in place     Endo:  #Hyperglycemia   - Hgb A1c 5.5  - Follow glucose levels     Heme:  #Leukocytosis  #Normocyctic anemia  - CBC daily     ID:  #afebrile overnight; tmax 99.8  # CRP 70, , procal 0.24 downtrending  #WBC 20.7 from 19.8 and 18.8  #Aspiration pneumonia  #Leukocytosis  - 5/19 Sputum culture with heavy growth Klebsiella oxytoca; Heavy growth Staphylococcus epidermidis; Heavy growth beta hemolytic Streptococcus constellatus   - Sputum 5/22--with mod growth of Pseudomonas aeruginosa   - Ceftriaxone (5/21-5/23) was switched to Zosyn (5/23-present)  - BC 5/19, 5/22 with NGTD  - panculture; so far GM stain with expected GNRs  - Follow fever curve  - UA negative     PPX:  DVT Prophylaxis  - SCDs in place  - Heparin subcutaneous    GI Prophylaxis  - Famotidine 20 mg BID      Lines/Tubes/Drains:  - PIV x 1  - L subclavian CVC/TL 5/15  - NG  - EVD  - Rectal tube  - Gutierrez catheter will pull today     Code Status: Full code     Dispo: ICU    The patient was seen and discussed with the attending, Dr. Casillas.    David Sands MD  Neuro ICU Resident PGY-1

## 2021-05-25 NOTE — PLAN OF CARE
Problem: Restraint for Non-Violent/Non-Self-Destructive Behavior  Goal: Prevent/Manage Potential Problems  Description: Maintain safety of patient and others during period of restraint.  Promote psychological and physical wellbeing.  Prevent injury to skin and involved body parts.  Promote nutrition, hydration, and elimination.  Outcome: No Change     Continues to pull at tubes and dressing. Will discontinue when patient improves.

## 2021-05-25 NOTE — PLAN OF CARE
Problem: Cerebral Tissue Perfusion Risk (Craniotomy/Craniectomy/Cranioplasty)  Goal: Effective Cerebral Tissue Perfusion  Outcome: Improving     Problem: Attention and Thought Clarity Impairment (Delirium)  Goal: Improved Attention and Thought Clarity  Outcome: Improving     Problem: Altered Behavior (Delirium)  Goal: Improved Behavioral Control  Outcome: Improving    D. Awakens with stimuli-follows commands 75% (inconsistent ) moves all extremity Left weaker than R . On vent pressure support 7/5 changed by md to 5/5, sats 97% with rr- 20-30s good volumes- lungs clear.  TCD show high flows in cerebral artery- CTA completed at 1200. ,   See report- up in chair for 3 hours- large stool output with inc.   A up out of bed - plan to extubate  in am  sedation on hold.   I improved resp status - plan am extubation. Cont to monitor

## 2021-05-26 ENCOUNTER — ANESTHESIA EVENT (OUTPATIENT)
Dept: INTENSIVE CARE | Facility: CLINIC | Age: 71
DRG: 003 | End: 2021-05-26
Payer: COMMERCIAL

## 2021-05-26 ENCOUNTER — ANESTHESIA (OUTPATIENT)
Dept: INTENSIVE CARE | Facility: CLINIC | Age: 71
DRG: 003 | End: 2021-05-26
Payer: COMMERCIAL

## 2021-05-26 ENCOUNTER — APPOINTMENT (OUTPATIENT)
Dept: GENERAL RADIOLOGY | Facility: CLINIC | Age: 71
DRG: 003 | End: 2021-05-26
Attending: NEUROLOGICAL SURGERY
Payer: COMMERCIAL

## 2021-05-26 ENCOUNTER — APPOINTMENT (OUTPATIENT)
Dept: GENERAL RADIOLOGY | Facility: CLINIC | Age: 71
DRG: 003 | End: 2021-05-26
Attending: STUDENT IN AN ORGANIZED HEALTH CARE EDUCATION/TRAINING PROGRAM
Payer: COMMERCIAL

## 2021-05-26 ENCOUNTER — APPOINTMENT (OUTPATIENT)
Dept: ULTRASOUND IMAGING | Facility: CLINIC | Age: 71
DRG: 003 | End: 2021-05-26
Attending: STUDENT IN AN ORGANIZED HEALTH CARE EDUCATION/TRAINING PROGRAM
Payer: COMMERCIAL

## 2021-05-26 ENCOUNTER — APPOINTMENT (OUTPATIENT)
Dept: CT IMAGING | Facility: CLINIC | Age: 71
DRG: 003 | End: 2021-05-26
Attending: STUDENT IN AN ORGANIZED HEALTH CARE EDUCATION/TRAINING PROGRAM
Payer: COMMERCIAL

## 2021-05-26 ENCOUNTER — APPOINTMENT (OUTPATIENT)
Dept: GENERAL RADIOLOGY | Facility: CLINIC | Age: 71
DRG: 003 | End: 2021-05-26
Attending: PSYCHIATRY & NEUROLOGY
Payer: COMMERCIAL

## 2021-05-26 LAB
ALBUMIN SERPL-MCNC: 2.1 G/DL (ref 3.4–5)
ALP SERPL-CCNC: 166 U/L (ref 40–150)
ALT SERPL W P-5'-P-CCNC: 89 U/L (ref 0–70)
ANION GAP SERPL CALCULATED.3IONS-SCNC: 6 MMOL/L (ref 3–14)
ANION GAP SERPL CALCULATED.3IONS-SCNC: 7 MMOL/L (ref 3–14)
AST SERPL W P-5'-P-CCNC: 31 U/L (ref 0–45)
BASE EXCESS BLDA CALC-SCNC: 0 MMOL/L
BASE EXCESS BLDA CALC-SCNC: 0 MMOL/L
BASE EXCESS BLDA CALC-SCNC: 0.5 MMOL/L
BASE EXCESS BLDA CALC-SCNC: 3.4 MMOL/L
BASE EXCESS BLDA CALC-SCNC: 4.2 MMOL/L
BASOPHILS # BLD AUTO: 0.1 10E9/L (ref 0–0.2)
BASOPHILS NFR BLD AUTO: 0.3 %
BILIRUB DIRECT SERPL-MCNC: <0.1 MG/DL (ref 0–0.2)
BILIRUB SERPL-MCNC: 0.3 MG/DL (ref 0.2–1.3)
BUN SERPL-MCNC: 21 MG/DL (ref 7–30)
BUN SERPL-MCNC: 21 MG/DL (ref 7–30)
CALCIUM SERPL-MCNC: 8.8 MG/DL (ref 8.5–10.1)
CALCIUM SERPL-MCNC: 8.8 MG/DL (ref 8.5–10.1)
CHLORIDE SERPL-SCNC: 107 MMOL/L (ref 94–109)
CHLORIDE SERPL-SCNC: 111 MMOL/L (ref 94–109)
CO2 SERPL-SCNC: 24 MMOL/L (ref 20–32)
CO2 SERPL-SCNC: 27 MMOL/L (ref 20–32)
CREAT SERPL-MCNC: 0.54 MG/DL (ref 0.66–1.25)
CREAT SERPL-MCNC: 0.57 MG/DL (ref 0.66–1.25)
DIFFERENTIAL METHOD BLD: ABNORMAL
EOSINOPHIL # BLD AUTO: 0.1 10E9/L (ref 0–0.7)
EOSINOPHIL NFR BLD AUTO: 0.3 %
ERYTHROCYTE [DISTWIDTH] IN BLOOD BY AUTOMATED COUNT: 12.6 % (ref 10–15)
ERYTHROCYTE [DISTWIDTH] IN BLOOD BY AUTOMATED COUNT: 12.7 % (ref 10–15)
GFR SERPL CREATININE-BSD FRML MDRD: >90 ML/MIN/{1.73_M2}
GFR SERPL CREATININE-BSD FRML MDRD: >90 ML/MIN/{1.73_M2}
GLUCOSE BLDC GLUCOMTR-MCNC: 123 MG/DL (ref 70–99)
GLUCOSE BLDC GLUCOMTR-MCNC: 134 MG/DL (ref 70–99)
GLUCOSE BLDC GLUCOMTR-MCNC: 168 MG/DL (ref 70–99)
GLUCOSE BLDC GLUCOMTR-MCNC: 169 MG/DL (ref 70–99)
GLUCOSE BLDC GLUCOMTR-MCNC: 170 MG/DL (ref 70–99)
GLUCOSE BLDC GLUCOMTR-MCNC: 177 MG/DL (ref 70–99)
GLUCOSE SERPL-MCNC: 139 MG/DL (ref 70–99)
GLUCOSE SERPL-MCNC: 169 MG/DL (ref 70–99)
HCO3 BLD-SCNC: 25 MMOL/L (ref 21–28)
HCO3 BLD-SCNC: 28 MMOL/L (ref 21–28)
HCO3 BLD-SCNC: 28 MMOL/L (ref 21–28)
HCT VFR BLD AUTO: 29.5 % (ref 40–53)
HCT VFR BLD AUTO: 30 % (ref 40–53)
HGB BLD-MCNC: 10 G/DL (ref 13.3–17.7)
HGB BLD-MCNC: 9.7 G/DL (ref 13.3–17.7)
IMM GRANULOCYTES # BLD: 0.7 10E9/L (ref 0–0.4)
IMM GRANULOCYTES NFR BLD: 3.2 %
INTERPRETATION ECG - MUSE: NORMAL
LACTATE BLD-SCNC: 1.2 MMOL/L (ref 0.7–2)
LYMPHOCYTES # BLD AUTO: 0.9 10E9/L (ref 0.8–5.3)
LYMPHOCYTES NFR BLD AUTO: 4.1 %
MAGNESIUM SERPL-MCNC: 2.4 MG/DL (ref 1.6–2.3)
MCH RBC QN AUTO: 30.7 PG (ref 26.5–33)
MCH RBC QN AUTO: 31.1 PG (ref 26.5–33)
MCHC RBC AUTO-ENTMCNC: 32.9 G/DL (ref 31.5–36.5)
MCHC RBC AUTO-ENTMCNC: 33.3 G/DL (ref 31.5–36.5)
MCV RBC AUTO: 93 FL (ref 78–100)
MCV RBC AUTO: 93 FL (ref 78–100)
MONOCYTES # BLD AUTO: 0.9 10E9/L (ref 0–1.3)
MONOCYTES NFR BLD AUTO: 4 %
NEUTROPHILS # BLD AUTO: 20.3 10E9/L (ref 1.6–8.3)
NEUTROPHILS NFR BLD AUTO: 88.1 %
NRBC # BLD AUTO: 0 10*3/UL
NRBC BLD AUTO-RTO: 0 /100
O2/TOTAL GAS SETTING VFR VENT: 60 %
O2/TOTAL GAS SETTING VFR VENT: 70 %
O2/TOTAL GAS SETTING VFR VENT: 80 %
O2/TOTAL GAS SETTING VFR VENT: ABNORMAL %
O2/TOTAL GAS SETTING VFR VENT: ABNORMAL %
OXYHGB MFR BLD: 81 % (ref 92–100)
OXYHGB MFR BLD: 84 % (ref 92–100)
PCO2 BLD: 37 MM HG (ref 35–45)
PCO2 BLD: 38 MM HG (ref 35–45)
PCO2 BLD: 40 MM HG (ref 35–45)
PH BLD: 7.4 PH (ref 7.35–7.45)
PH BLD: 7.4 PH (ref 7.35–7.45)
PH BLD: 7.43 PH (ref 7.35–7.45)
PH BLD: 7.45 PH (ref 7.35–7.45)
PH BLD: 7.48 PH (ref 7.35–7.45)
PHOSPHATE SERPL-MCNC: 2.8 MG/DL (ref 2.5–4.5)
PLATELET # BLD AUTO: 468 10E9/L (ref 150–450)
PLATELET # BLD AUTO: 537 10E9/L (ref 150–450)
PO2 BLD: 49 MM HG (ref 80–105)
PO2 BLD: 55 MM HG (ref 80–105)
PO2 BLD: 55 MM HG (ref 80–105)
PO2 BLD: 56 MM HG (ref 80–105)
PO2 BLD: 61 MM HG (ref 80–105)
POTASSIUM SERPL-SCNC: 3.5 MMOL/L (ref 3.4–5.3)
POTASSIUM SERPL-SCNC: 3.5 MMOL/L (ref 3.4–5.3)
PROT SERPL-MCNC: 7.3 G/DL (ref 6.8–8.8)
RBC # BLD AUTO: 3.16 10E12/L (ref 4.4–5.9)
RBC # BLD AUTO: 3.22 10E12/L (ref 4.4–5.9)
SODIUM SERPL-SCNC: 140 MMOL/L (ref 133–144)
SODIUM SERPL-SCNC: 140 MMOL/L (ref 133–144)
SODIUM SERPL-SCNC: 142 MMOL/L (ref 133–144)
WBC # BLD AUTO: 19.2 10E9/L (ref 4–11)
WBC # BLD AUTO: 23.1 10E9/L (ref 4–11)

## 2021-05-26 PROCEDURE — 85027 COMPLETE CBC AUTOMATED: CPT | Performed by: STUDENT IN AN ORGANIZED HEALTH CARE EDUCATION/TRAINING PROGRAM

## 2021-05-26 PROCEDURE — 999N000065 XR CHEST PORT 1 VIEW

## 2021-05-26 PROCEDURE — 85025 COMPLETE CBC W/AUTO DIFF WBC: CPT | Performed by: STUDENT IN AN ORGANIZED HEALTH CARE EDUCATION/TRAINING PROGRAM

## 2021-05-26 PROCEDURE — 250N000009 HC RX 250: Performed by: NURSE PRACTITIONER

## 2021-05-26 PROCEDURE — 250N000013 HC RX MED GY IP 250 OP 250 PS 637: Performed by: STUDENT IN AN ORGANIZED HEALTH CARE EDUCATION/TRAINING PROGRAM

## 2021-05-26 PROCEDURE — 250N000009 HC RX 250

## 2021-05-26 PROCEDURE — 71045 X-RAY EXAM CHEST 1 VIEW: CPT | Mod: 26 | Performed by: RADIOLOGY

## 2021-05-26 PROCEDURE — 71275 CT ANGIOGRAPHY CHEST: CPT

## 2021-05-26 PROCEDURE — 82803 BLOOD GASES ANY COMBINATION: CPT | Performed by: NEUROLOGICAL SURGERY

## 2021-05-26 PROCEDURE — 250N000011 HC RX IP 250 OP 636: Performed by: STUDENT IN AN ORGANIZED HEALTH CARE EDUCATION/TRAINING PROGRAM

## 2021-05-26 PROCEDURE — 999N000157 HC STATISTIC RCP TIME EA 10 MIN

## 2021-05-26 PROCEDURE — 250N000009 HC RX 250: Performed by: PSYCHIATRY & NEUROLOGY

## 2021-05-26 PROCEDURE — 83735 ASSAY OF MAGNESIUM: CPT | Performed by: STUDENT IN AN ORGANIZED HEALTH CARE EDUCATION/TRAINING PROGRAM

## 2021-05-26 PROCEDURE — 250N000011 HC RX IP 250 OP 636: Performed by: NURSE ANESTHETIST, CERTIFIED REGISTERED

## 2021-05-26 PROCEDURE — 82803 BLOOD GASES ANY COMBINATION: CPT | Performed by: STUDENT IN AN ORGANIZED HEALTH CARE EDUCATION/TRAINING PROGRAM

## 2021-05-26 PROCEDURE — 93010 ELECTROCARDIOGRAM REPORT: CPT | Performed by: INTERNAL MEDICINE

## 2021-05-26 PROCEDURE — 94664 DEMO&/EVAL PT USE INHALER: CPT

## 2021-05-26 PROCEDURE — 84100 ASSAY OF PHOSPHORUS: CPT | Performed by: STUDENT IN AN ORGANIZED HEALTH CARE EDUCATION/TRAINING PROGRAM

## 2021-05-26 PROCEDURE — 250N000011 HC RX IP 250 OP 636: Performed by: NURSE PRACTITIONER

## 2021-05-26 PROCEDURE — 94640 AIRWAY INHALATION TREATMENT: CPT

## 2021-05-26 PROCEDURE — 80048 BASIC METABOLIC PNL TOTAL CA: CPT | Performed by: STUDENT IN AN ORGANIZED HEALTH CARE EDUCATION/TRAINING PROGRAM

## 2021-05-26 PROCEDURE — 999N000185 HC STATISTIC TRANSPORT TIME EA 15 MIN

## 2021-05-26 PROCEDURE — 250N000011 HC RX IP 250 OP 636: Performed by: PSYCHIATRY & NEUROLOGY

## 2021-05-26 PROCEDURE — 250N000013 HC RX MED GY IP 250 OP 250 PS 637: Performed by: NURSE PRACTITIONER

## 2021-05-26 PROCEDURE — 71045 X-RAY EXAM CHEST 1 VIEW: CPT

## 2021-05-26 PROCEDURE — 250N000011 HC RX IP 250 OP 636

## 2021-05-26 PROCEDURE — 370N000003 HC ANESTHESIA WARD SERVICE

## 2021-05-26 PROCEDURE — 93886 INTRACRANIAL COMPLETE STUDY: CPT | Mod: 26 | Performed by: RADIOLOGY

## 2021-05-26 PROCEDURE — 82805 BLOOD GASES W/O2 SATURATION: CPT | Performed by: PSYCHIATRY & NEUROLOGY

## 2021-05-26 PROCEDURE — 250N000009 HC RX 250: Performed by: STUDENT IN AN ORGANIZED HEALTH CARE EDUCATION/TRAINING PROGRAM

## 2021-05-26 PROCEDURE — 999N001017 HC STATISTIC GLUCOSE BY METER IP

## 2021-05-26 PROCEDURE — 200N000002 HC R&B ICU UMMC

## 2021-05-26 PROCEDURE — 250N000013 HC RX MED GY IP 250 OP 250 PS 637: Performed by: NEUROLOGICAL SURGERY

## 2021-05-26 PROCEDURE — 258N000003 HC RX IP 258 OP 636: Performed by: NURSE ANESTHETIST, CERTIFIED REGISTERED

## 2021-05-26 PROCEDURE — 36600 WITHDRAWAL OF ARTERIAL BLOOD: CPT

## 2021-05-26 PROCEDURE — 83605 ASSAY OF LACTIC ACID: CPT | Performed by: PSYCHIATRY & NEUROLOGY

## 2021-05-26 PROCEDURE — 71045 X-RAY EXAM CHEST 1 VIEW: CPT | Mod: 76

## 2021-05-26 PROCEDURE — 250N000009 HC RX 250: Performed by: NURSE ANESTHETIST, CERTIFIED REGISTERED

## 2021-05-26 PROCEDURE — 258N000003 HC RX IP 258 OP 636: Performed by: STUDENT IN AN ORGANIZED HEALTH CARE EDUCATION/TRAINING PROGRAM

## 2021-05-26 PROCEDURE — 80076 HEPATIC FUNCTION PANEL: CPT | Performed by: STUDENT IN AN ORGANIZED HEALTH CARE EDUCATION/TRAINING PROGRAM

## 2021-05-26 PROCEDURE — 94003 VENT MGMT INPAT SUBQ DAY: CPT

## 2021-05-26 PROCEDURE — 93886 INTRACRANIAL COMPLETE STUDY: CPT

## 2021-05-26 PROCEDURE — 93005 ELECTROCARDIOGRAM TRACING: CPT

## 2021-05-26 PROCEDURE — 84295 ASSAY OF SERUM SODIUM: CPT | Performed by: NEUROLOGICAL SURGERY

## 2021-05-26 PROCEDURE — 71275 CT ANGIOGRAPHY CHEST: CPT | Mod: 26 | Performed by: RADIOLOGY

## 2021-05-26 PROCEDURE — 99291 CRITICAL CARE FIRST HOUR: CPT | Mod: GC | Performed by: PSYCHIATRY & NEUROLOGY

## 2021-05-26 PROCEDURE — 258N000003 HC RX IP 258 OP 636: Performed by: PSYCHIATRY & NEUROLOGY

## 2021-05-26 PROCEDURE — 94640 AIRWAY INHALATION TREATMENT: CPT | Mod: 76

## 2021-05-26 RX ORDER — NALOXONE HYDROCHLORIDE 0.4 MG/ML
0.4 INJECTION, SOLUTION INTRAMUSCULAR; INTRAVENOUS; SUBCUTANEOUS
Status: DISCONTINUED | OUTPATIENT
Start: 2021-05-26 | End: 2021-05-26

## 2021-05-26 RX ORDER — NALOXONE HYDROCHLORIDE 0.4 MG/ML
0.2 INJECTION, SOLUTION INTRAMUSCULAR; INTRAVENOUS; SUBCUTANEOUS
Status: DISCONTINUED | OUTPATIENT
Start: 2021-05-26 | End: 2021-05-26

## 2021-05-26 RX ORDER — FENTANYL CITRATE 50 UG/ML
25-50 INJECTION, SOLUTION INTRAMUSCULAR; INTRAVENOUS EVERY 5 MIN PRN
Status: DISCONTINUED | OUTPATIENT
Start: 2021-05-26 | End: 2021-05-27

## 2021-05-26 RX ORDER — HEPARIN SODIUM 200 [USP'U]/100ML
1 INJECTION, SOLUTION INTRAVENOUS CONTINUOUS PRN
Status: DISCONTINUED | OUTPATIENT
Start: 2021-05-26 | End: 2021-05-27

## 2021-05-26 RX ORDER — ACETYLCYSTEINE 100 MG/ML
4 SOLUTION ORAL; RESPIRATORY (INHALATION) EVERY 6 HOURS
Status: DISCONTINUED | OUTPATIENT
Start: 2021-05-26 | End: 2021-05-27

## 2021-05-26 RX ORDER — PROPOFOL 10 MG/ML
INJECTION, EMULSION INTRAVENOUS
Status: DISCONTINUED | OUTPATIENT
Start: 2021-05-26 | End: 2021-05-26

## 2021-05-26 RX ORDER — PROPOFOL 10 MG/ML
INJECTION, EMULSION INTRAVENOUS
Status: COMPLETED
Start: 2021-05-26 | End: 2021-05-26

## 2021-05-26 RX ORDER — PROPOFOL 10 MG/ML
0-75 INJECTION, EMULSION INTRAVENOUS CONTINUOUS
Status: DISCONTINUED | OUTPATIENT
Start: 2021-05-26 | End: 2021-05-29

## 2021-05-26 RX ORDER — SODIUM CHLORIDE FOR INHALATION 3 %
3 VIAL, NEBULIZER (ML) INHALATION EVERY 6 HOURS
Status: DISCONTINUED | OUTPATIENT
Start: 2021-05-26 | End: 2021-05-27

## 2021-05-26 RX ORDER — PROPOFOL 10 MG/ML
5-75 INJECTION, EMULSION INTRAVENOUS CONTINUOUS
Status: DISCONTINUED | OUTPATIENT
Start: 2021-05-26 | End: 2021-05-26

## 2021-05-26 RX ORDER — ALBUTEROL SULFATE 0.83 MG/ML
SOLUTION RESPIRATORY (INHALATION)
Status: COMPLETED
Start: 2021-05-26 | End: 2021-05-26

## 2021-05-26 RX ORDER — IPRATROPIUM BROMIDE AND ALBUTEROL SULFATE 2.5; .5 MG/3ML; MG/3ML
3 SOLUTION RESPIRATORY (INHALATION)
Status: DISCONTINUED | OUTPATIENT
Start: 2021-05-26 | End: 2021-05-26

## 2021-05-26 RX ORDER — FUROSEMIDE 10 MG/ML
20 INJECTION INTRAMUSCULAR; INTRAVENOUS ONCE
Status: COMPLETED | OUTPATIENT
Start: 2021-05-26 | End: 2021-05-26

## 2021-05-26 RX ORDER — ALBUTEROL SULFATE 0.83 MG/ML
2.5 SOLUTION RESPIRATORY (INHALATION) EVERY 6 HOURS
Status: DISCONTINUED | OUTPATIENT
Start: 2021-05-26 | End: 2021-05-27

## 2021-05-26 RX ORDER — IOPAMIDOL 755 MG/ML
55 INJECTION, SOLUTION INTRAVASCULAR ONCE
Status: COMPLETED | OUTPATIENT
Start: 2021-05-26 | End: 2021-05-26

## 2021-05-26 RX ORDER — POTASSIUM CHLORIDE 1.5 G/1.58G
20 POWDER, FOR SOLUTION ORAL ONCE
Status: COMPLETED | OUTPATIENT
Start: 2021-05-26 | End: 2021-05-26

## 2021-05-26 RX ORDER — FLUMAZENIL 0.1 MG/ML
0.2 INJECTION, SOLUTION INTRAVENOUS
Status: DISCONTINUED | OUTPATIENT
Start: 2021-05-26 | End: 2021-05-27

## 2021-05-26 RX ADMIN — PROPOFOL 20 MCG/KG/MIN: 10 INJECTION, EMULSION INTRAVENOUS at 06:55

## 2021-05-26 RX ADMIN — ACETAMINOPHEN 650 MG: 325 TABLET, FILM COATED ORAL at 19:46

## 2021-05-26 RX ADMIN — Medication 10 ML: at 12:11

## 2021-05-26 RX ADMIN — FAMOTIDINE 20 MG: 20 TABLET ORAL at 19:46

## 2021-05-26 RX ADMIN — Medication 50 MCG/HR: at 15:56

## 2021-05-26 RX ADMIN — SODIUM CHLORIDE SOLN NEBU 3% 3 ML: 3 NEBU SOLN at 08:06

## 2021-05-26 RX ADMIN — HEPARIN SODIUM 5000 UNITS: 5000 INJECTION, SOLUTION INTRAVENOUS; SUBCUTANEOUS at 00:58

## 2021-05-26 RX ADMIN — ACETYLCYSTEINE 4 ML: 100 SOLUTION ORAL; RESPIRATORY (INHALATION) at 19:39

## 2021-05-26 RX ADMIN — FENTANYL CITRATE 25 MCG: 50 INJECTION, SOLUTION INTRAMUSCULAR; INTRAVENOUS at 11:30

## 2021-05-26 RX ADMIN — INSULIN ASPART 2 UNITS: 100 INJECTION, SOLUTION INTRAVENOUS; SUBCUTANEOUS at 12:13

## 2021-05-26 RX ADMIN — FENTANYL CITRATE 50 MCG: 50 INJECTION, SOLUTION INTRAMUSCULAR; INTRAVENOUS at 01:19

## 2021-05-26 RX ADMIN — PHENYLEPHRINE HYDROCHLORIDE 200 MCG: 10 INJECTION INTRAVENOUS at 07:05

## 2021-05-26 RX ADMIN — ALBUTEROL SULFATE 2.5 MG: 2.5 SOLUTION RESPIRATORY (INHALATION) at 09:15

## 2021-05-26 RX ADMIN — PIPERACILLIN SODIUM AND TAZOBACTAM SODIUM 4.5 G: 4; .5 INJECTION, POWDER, LYOPHILIZED, FOR SOLUTION INTRAVENOUS at 21:45

## 2021-05-26 RX ADMIN — DOXAZOSIN 1 MG: 1 TABLET ORAL at 07:44

## 2021-05-26 RX ADMIN — ACETYLCYSTEINE 4 ML: 100 SOLUTION ORAL; RESPIRATORY (INHALATION) at 16:18

## 2021-05-26 RX ADMIN — HEPARIN SODIUM 5000 UNITS: 5000 INJECTION, SOLUTION INTRAVENOUS; SUBCUTANEOUS at 08:16

## 2021-05-26 RX ADMIN — QUETIAPINE 50 MG: 50 TABLET ORAL at 07:43

## 2021-05-26 RX ADMIN — ACETAMINOPHEN 650 MG: 325 TABLET, FILM COATED ORAL at 01:22

## 2021-05-26 RX ADMIN — Medication 10 ML: at 07:44

## 2021-05-26 RX ADMIN — PROPOFOL 20 MCG/KG/MIN: 10 INJECTION, EMULSION INTRAVENOUS at 19:39

## 2021-05-26 RX ADMIN — NIMODIPINE 60 MG: 30 SOLUTION ORAL at 03:34

## 2021-05-26 RX ADMIN — POTASSIUM CHLORIDE 20 MEQ: 1.5 POWDER, FOR SOLUTION ORAL at 21:45

## 2021-05-26 RX ADMIN — Medication 1 PACKET: at 19:47

## 2021-05-26 RX ADMIN — INSULIN ASPART 2 UNITS: 100 INJECTION, SOLUTION INTRAVENOUS; SUBCUTANEOUS at 08:15

## 2021-05-26 RX ADMIN — SODIUM CHLORIDE SOLN NEBU 3% 3 ML: 3 NEBU SOLN at 16:18

## 2021-05-26 RX ADMIN — PHENYLEPHRINE HYDROCHLORIDE 100 MCG: 10 INJECTION INTRAVENOUS at 07:07

## 2021-05-26 RX ADMIN — NIMODIPINE 60 MG: 30 SOLUTION ORAL at 12:11

## 2021-05-26 RX ADMIN — HEPARIN SODIUM 5000 UNITS: 5000 INJECTION, SOLUTION INTRAVENOUS; SUBCUTANEOUS at 16:02

## 2021-05-26 RX ADMIN — Medication 1 PACKET: at 13:42

## 2021-05-26 RX ADMIN — Medication 10 ML: at 00:29

## 2021-05-26 RX ADMIN — Medication 10 ML: at 19:47

## 2021-05-26 RX ADMIN — Medication 10 ML: at 16:04

## 2021-05-26 RX ADMIN — DEXMEDETOMIDINE 1.2 MCG/KG/HR: 100 INJECTION, SOLUTION, CONCENTRATE INTRAVENOUS at 17:47

## 2021-05-26 RX ADMIN — POTASSIUM CHLORIDE 20 MEQ: 1.5 POWDER, FOR SOLUTION ORAL at 08:15

## 2021-05-26 RX ADMIN — FENTANYL CITRATE 50 MCG: 50 INJECTION, SOLUTION INTRAMUSCULAR; INTRAVENOUS at 04:51

## 2021-05-26 RX ADMIN — PIPERACILLIN SODIUM AND TAZOBACTAM SODIUM 4.5 G: 4; .5 INJECTION, POWDER, LYOPHILIZED, FOR SOLUTION INTRAVENOUS at 04:18

## 2021-05-26 RX ADMIN — POTASSIUM CHLORIDE 20 MEQ: 1.5 POWDER, FOR SOLUTION ORAL at 19:46

## 2021-05-26 RX ADMIN — ALBUTEROL SULFATE 2.5 MG: 2.5 SOLUTION RESPIRATORY (INHALATION) at 16:18

## 2021-05-26 RX ADMIN — FUROSEMIDE 20 MG: 10 INJECTION, SOLUTION INTRAVENOUS at 09:26

## 2021-05-26 RX ADMIN — IPRATROPIUM BROMIDE AND ALBUTEROL SULFATE 3 ML: .5; 3 SOLUTION RESPIRATORY (INHALATION) at 08:48

## 2021-05-26 RX ADMIN — INSULIN ASPART 1 UNITS: 100 INJECTION, SOLUTION INTRAVENOUS; SUBCUTANEOUS at 04:16

## 2021-05-26 RX ADMIN — ACETAMINOPHEN 650 MG: 325 TABLET, FILM COATED ORAL at 05:14

## 2021-05-26 RX ADMIN — PIPERACILLIN SODIUM AND TAZOBACTAM SODIUM 4.5 G: 4; .5 INJECTION, POWDER, LYOPHILIZED, FOR SOLUTION INTRAVENOUS at 11:16

## 2021-05-26 RX ADMIN — SODIUM CHLORIDE SOLN NEBU 3% 3 ML: 3 NEBU SOLN at 01:35

## 2021-05-26 RX ADMIN — FENTANYL CITRATE 25 MCG: 50 INJECTION, SOLUTION INTRAMUSCULAR; INTRAVENOUS at 13:38

## 2021-05-26 RX ADMIN — ROCURONIUM BROMIDE 100 MG: 10 INJECTION INTRAVENOUS at 07:04

## 2021-05-26 RX ADMIN — POTASSIUM CHLORIDE 20 MEQ: 1.5 POWDER, FOR SOLUTION ORAL at 12:12

## 2021-05-26 RX ADMIN — DEXMEDETOMIDINE 1.2 MCG/KG/HR: 100 INJECTION, SOLUTION, CONCENTRATE INTRAVENOUS at 22:53

## 2021-05-26 RX ADMIN — NIMODIPINE 60 MG: 30 SOLUTION ORAL at 07:44

## 2021-05-26 RX ADMIN — FAMOTIDINE 20 MG: 20 TABLET ORAL at 07:43

## 2021-05-26 RX ADMIN — NIMODIPINE 60 MG: 30 SOLUTION ORAL at 19:48

## 2021-05-26 RX ADMIN — LABETALOL HYDROCHLORIDE 10 MG: 5 INJECTION, SOLUTION INTRAVENOUS at 07:45

## 2021-05-26 RX ADMIN — ALBUTEROL SULFATE 2.5 MG: 2.5 SOLUTION RESPIRATORY (INHALATION) at 19:39

## 2021-05-26 RX ADMIN — NIMODIPINE 60 MG: 30 SOLUTION ORAL at 00:29

## 2021-05-26 RX ADMIN — PROPOFOL 100 MG: 10 INJECTION, EMULSION INTRAVENOUS at 07:04

## 2021-05-26 RX ADMIN — DEXMEDETOMIDINE 0.8 MCG/KG/HR: 100 INJECTION, SOLUTION, CONCENTRATE INTRAVENOUS at 04:29

## 2021-05-26 RX ADMIN — FENTANYL CITRATE 25 MCG: 50 INJECTION, SOLUTION INTRAMUSCULAR; INTRAVENOUS at 20:25

## 2021-05-26 RX ADMIN — QUETIAPINE 50 MG: 50 TABLET ORAL at 19:46

## 2021-05-26 RX ADMIN — ACETYLCYSTEINE 4 ML: 100 SOLUTION ORAL; RESPIRATORY (INHALATION) at 09:14

## 2021-05-26 RX ADMIN — SODIUM CHLORIDE, POTASSIUM CHLORIDE, SODIUM LACTATE AND CALCIUM CHLORIDE 1000 ML: 600; 310; 30; 20 INJECTION, SOLUTION INTRAVENOUS at 07:13

## 2021-05-26 RX ADMIN — Medication 12.5 MG: at 01:46

## 2021-05-26 RX ADMIN — Medication 1 PACKET: at 08:16

## 2021-05-26 RX ADMIN — IOPAMIDOL 55 ML: 755 INJECTION, SOLUTION INTRAVENOUS at 11:28

## 2021-05-26 RX ADMIN — PIPERACILLIN SODIUM AND TAZOBACTAM SODIUM 4.5 G: 4; .5 INJECTION, POWDER, LYOPHILIZED, FOR SOLUTION INTRAVENOUS at 16:02

## 2021-05-26 RX ADMIN — Medication 10 ML: at 03:34

## 2021-05-26 RX ADMIN — NIMODIPINE 60 MG: 30 SOLUTION ORAL at 16:03

## 2021-05-26 RX ADMIN — FENTANYL CITRATE 25 MCG: 50 INJECTION, SOLUTION INTRAMUSCULAR; INTRAVENOUS at 22:38

## 2021-05-26 RX ADMIN — INSULIN ASPART 2 UNITS: 100 INJECTION, SOLUTION INTRAVENOUS; SUBCUTANEOUS at 16:13

## 2021-05-26 ASSESSMENT — MIFFLIN-ST. JEOR: SCORE: 1496.13

## 2021-05-26 ASSESSMENT — ACTIVITIES OF DAILY LIVING (ADL)
ADLS_ACUITY_SCORE: 22

## 2021-05-26 NOTE — PLAN OF CARE
Problem: Sleep Disturbance (Delirium)  Goal: Improved Sleep  Outcome: No Change     Problem: Pain (Craniotomy/Craniectomy/Cranioplasty)  Goal: Acceptable Pain Control  Intervention: Prevent or Manage Pain  Recent Flowsheet Documentation  Taken 5/26/2021 0800 by Tanisha Altman RN  Pain Management Interventions: medication (see MAR)     Problem: Postoperative Urinary Retention (Craniotomy/Craniectomy/Cranioplasty)  Goal: Effective Urinary Elimination  Outcome: Improving     Problem: Restraint for Non-Violent/Non-Self-Destructive Behavior  Goal: Prevent/Manage Potential Problems  Description: Maintain safety of patient and others during period of restraint.  Promote psychological and physical wellbeing.  Prevent injury to skin and involved body parts.  Promote nutrition, hydration, and elimination.  Outcome: No Change       Major Shift Events: ETT 7.5 replaced this AM, 27 at lips. CMV FiO2 80%, RR 14, PEEP 12, . O2 sat 88%-96%, intermittently dropping to mid-low 80's. MD aware. LS coarse. Alert, following commands. Pupils brisk/equal. EVD at 10 above EAM, ICP 0-12, output 0-20cc. SR, HR 80s-90s. Sys <200, remains in 150s-170s. Primafit in place with adequate UOP. Precedex gtt @ 1.2, Fentanyl @ 50, pt remains restless.      Plan: Continue to manage O2 needs, possible bronch tomorrow.     For vital signs and complete assessments, please see documentation flowsheets.

## 2021-05-26 NOTE — PLAN OF CARE
PT4AB: cancel- per RN, not medically appropriate for therapy today due to respiratory needs. Will re-schedule per POC.

## 2021-05-26 NOTE — PROGRESS NOTES
Neurosurgery progress note    S: increased FiO2 and PEEP needed in the evening; sedation started to harmonize ventilation; improved overnight    O:  Temp:  [98.1  F (36.7  C)-99.7  F (37.6  C)] 99.7  F (37.6  C)  Pulse:  [71-98] 81  Resp:  [18-36] 26  BP: ()/() 135/84  FiO2 (%):  [50 %-100 %] 50 %  SpO2:  [85 %-100 %] 96 %    Exam:  Incision: c/d/i, drain and EVD in place  Intubated and heavily sedated  PERRL  No movement to noxious stimili      Imaging:  No new imaging    Impression:   No change in small area of decreased CBV and CBF in the anterior right temporal lobe corresponding to region of known hemorrhage. No new perfusion abnormality.      ASSESSMENT: 70 year old man presented with AMS found to have aSAH with right supraclinoid aneurysm. mFS4 and HH5. GCS 5. S/p EVD placement, DCA,s/p aneurysm clipping on 5/16.     The following conditions are also present, complicating the patient's current management, as described in the Assessment and Plan:   mechanical ventilation on day of admission, intracerebral hematoma, brain compression, acute respiratory failure, coma, based on current GCS of 5 and cerebral aneurysm rupture with hunt sims scale 5 modified lowery 4     Hypokalemia   Severe malnutrition in the context of acute on chronic illness  Respiratory failure  Pneumonia     RECOMMENDATIONS:  - EVD: 10 open  - Q1hr neuro exam  - SBP<200  - HOB > 30 degrees  - wean sedation as possible  - follow-up clx  -- zosyn for PNA  - Continuous cardiac monitoring while in ICU   - Nimodipine  - daily TCDs  - Glucose < 180  - Continue glucose checks  - Platelets > 100,000  - INR < 1.5  - Hemoglobin > 8  - DVT: SCDs while in bed  - Disposition: 4A  - PT/OT consulted        Samuel Nolasco MD, PhD  Neurosurgery Resident PGY-2    Please contact neurosurgery resident on call with questions.    Dial * * [phone], enter [phone] when prompted.

## 2021-05-26 NOTE — PLAN OF CARE
Major Shift Events: Neuros unchanged.   Restlessness, increased FiO2 requirements and tachypenic on pressure support and CMV/AC settings with copious tan/yellowish secretions. SaO2 high 80s to low 90s. Chest x-ray completed. 12 lead EKG completed, unremarkable. Precedex increased to 1.2/hr. PRN seroquel given x1. PRN fentanyl given Q4H. ABGs completed at 0530. P/F ratio 70s. Propofol gtt started. ETT exchanged at 0700. Became hypotensive, 1000 mL LR bolus administered. Currently on CMV/AC settings; rate 14, FiO2 60%, , PEEP 8.    Plan: SaO2 >92%. SBP goal <180. Neuros Q2H.      For vital signs, complete assessments, and hourly EVD output and ICP, please see documentation flowsheets.

## 2021-05-26 NOTE — PROGRESS NOTES
CLINICAL NUTRITION SERVICES - REASSESSMENT NOTE     Nutrition Prescription    RECOMMENDATIONS FOR MDs/PROVIDERS TO ORDER:  - Total daily fluids/adjustments per MD   - Electrolyte replacement per protocol as indicated      Malnutrition Status:    - Severe malnutrition in the context of acute on chronic illness     Recommendations already ordered by Registered Dietitian (RD):  - Continue: Osmolite 1.5 Cristi @ goal of  60ml/hr (1440ml/day) will provide: 2160 kcals (31+ kcals/kg), 90 g PRO (1.3+ gm/kg), 1097 ml free H20, 293 g CHO, and 0 g fiber daily.  - Additional 3 packets Prosource = 123 gm PRO (1.8 gm/kg) and 2240 kcals (32 kcals)  - Elevated HOB  - Ordered metabolic cart study in the setting of ventilation     Future/Additional Recommendations:  - EN tolerance via current access/NGT (Cortrak)  - Weight trends   - Met cart results (ordered 5/27, not back yet)  - Propofol/EN adjustments      EVALUATION OF THE PROGRESS TOWARD GOALS   Diet: NPO  Nutrition Support: Osmolite 1.5 Cristi @ goal of  60ml/hr  (1440ml/day)  will provide: 2160 kcals (31 kcals/kg), 90 g PRO (1.3 gm/kg), 1097 ml free H20, 293 g CHO, and 0 g fiber daily + Additional 3 packets Prosource = 123 gm PRO (1.8 gm/kg) and 2240 kcals (32 kcals)  Intake: 7-day averages =  1359 ml, 2039 kcals (30 kcals/kg), 83 gm PRO (1.2 gm/kg) + Prosource = 2159 kcals (31 kcals/kg) and 116 gm PRO (1.7 gm/kg)      NEW FINDINGS   Nutrition/GI: Pt continues on EN via current access/NGT (Cortrak). Liquid stools - rectal tube given high output.      Neuro: Aneurysmal subarachnoid hemorrhage, HH 5, MF 4; Ruptured right P-comm aneurysm status post clipping 5/15; IVH with subsequent hydrocephalus status post LF EVD 5/15; agitation.     CV: HTN - ECHO completed on 5/17; EF 60-65%     Resp: Pt was intubated 5/19 - desaturations that improved with sedation     Renal: Fluid up since admit.      Skin: Skin beneath nasal bridle was inspected; FT moved midline, some tension to right nare  observed. Skin appears crusty with old blood. Otherwise skin appears pale, fragile.      Labs/meds:  Creatinine: 0.60 (L); phos: 4.1 (WNL); magnesium: 2.4 (H); Alk phos: 166 (H); ALT: 89 (H); glucose trends: 178, 170, 169, 184. Insulin; Lasix; Certavite; Miralax; Senokot; Neutra-phos (discontinued); electrolyte replacement. Propofol @ 12.6 ml/hr = 333 kcals      Weights: Overall weight has trended up slightly from admit: 69 kg --> 70.6 kg. Likely r/t fluid status. Pt now on Lasix.     MALNUTRITION  % Intake: </= 50% for >/= 5 days (severe) - improved   % Weight Loss: None noted  Subcutaneous Fat Loss: Facial mild region: moderate and Upper arm: moderate  Muscle Loss: Temporal:  moderate, Thoracic region (clavicle, acromium bone, deltoid, trapezius, pectoral): severe, Upper arm (bicep, tricep):  moderate and Upper leg (quadricep, hamstring): moderate; calf: severe  Fluid Accumulation/Edema: None noted  Malnutrition Diagnosis: Severe malnutrition in the context of acute on chronic illness     Previous Goals   Total avg nutritional intake to meet a minimum of 30 kcal/kg and 1.5 g PRO/kg daily (per dosing wt 69 kg).  Evaluation: Met    Previous Nutrition Diagnosis  Inadequate protein-energy intake related to disruptions to EN infusions as evidenced by pt not meeting goal kcals/PRO intake with 7-day averages meeting 15 kcals/kg and 0.9 gm PRO/kg dosing weight    Evaluation: Improving    CURRENT NUTRITION DIAGNOSIS  Inadequate oral intake related to inability to take oral PO/vented as evidenced by pt requires nutrition support to meet 100% nutrition needs.       INTERVENTIONS  Implementation  Enteral Nutrition - continue as ordered and monitor accordingly; follow for met cart results and analysis -- repeat cart as appro     Goals  Total avg nutritional intake to meet a minimum of 25 kcal/kg and 1.5 g PRO/kg daily (per dosing wt 69 kg).    Monitoring/Evaluation  Progress toward goals will be monitored and evaluated per  protocol.     Veronica Ramirez RD, RENATE, [facility]  Neuro ICU  Pager: [phone]

## 2021-05-26 NOTE — ANESTHESIA PROCEDURE NOTES
Airway       Patient location during procedure: OR  Staff -        CRNA: Joanne Grant APRN CRNA       Performed By: CRNA  Consent for Airway        Urgency: emergent  Indications and Patient Condition       Indications for airway management: altered level of consciousness and airway protection       Induction type:intravenous       Mask difficulty assessment: 0 - not attempted    Final Airway Details       Final airway type: endotracheal airway       Successful airway: ETT - single  Endotracheal Airway Details        ETT size (mm): 7.5       Cuffed: yes       Successful intubation technique: video laryngoscopy       VL Blade Size: MAC 4       Grade View of Cords: 1       Adjucts: stylet       Position: Right       Measured from: lips       Secured at (cm): 23       Bite block used: None    Post intubation assessment        Placement verified by: capnometry, equal breath sounds and chest rise        Number of attempts at approach: 1       Secured with: commercial tube smith       Ease of procedure: easy       Dentition: Intact and Unchanged    Medication(s) Administered   propofol (DIPRIVAN) injection 10 mg/mL vial, 100 mg  rocuronium (ZEMURON) 10 mg/mL injection, 100 mg  Medication Administration Time: 5/26/2021 7:04 AM    Additional Comments       Upon arrival, report from nurse that large amounts of air have been added to the cuff. Obtained view with cmac, ETT subraglottic, cords appeared red and tender. Air and ETT removed, easily replaced with 7.5 ETT by request of Dr. Casillas Neurocrit. A total of 300 mcg of phenylephrine administered.

## 2021-05-26 NOTE — PROGRESS NOTES
Neuroscience Intensive Care Progress Note  2021    Problem List  1.  Aneurysmal subarachnoid hemorrhage, HH 5, MF 4, bleed day 5/15  2.  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  Hydrocephalus status post EVD 5/15  4.  Hypertension  5.  Acute hypoxic respiratory failure  6.  Hypernatremia  7.  Hyperchloremia  8.  Leukocytosis  9.  Normocytic anemia    AE  - dropping O2 sats; with increased secretions and malpositioned ETT, replaced, with improvement in O2 sats.     Temp: 98.6  F (37  C) Temp  Min: 98.6  F (37  C)  Max: 100.4  F (38  C)  Resp: 13 Resp  Min: 13  Max: 38  SpO2: 99 % SpO2  Min: 84 %  Max: 100 %  Pulse: 69 Pulse  Min: 62  Max: 105    No data recorded  BP: (!) 80/56   Systolic (24hrs), Av , Min:63 , Max:189   Diastolic (24hrs), Av, Min:46, Max:112      Ventilator Settings  Ventilation Mode: CMV/AC  (Continuous Mandatory Ventilation/ Assist Control)  FiO2 (%): 80 %  Rate Set (breaths/minute): 14 breaths/min  Tidal Volume Set (mL): 500 mL  PEEP (cm H2O): 5 cmH2O  Pressure Support (cm H2O): 7 cmH2O  Oxygen Concentration (%): 60 %  Peak Inspiratory Pressure (cm H2O) (Drager Marifer): 16  Resp: 13          Intake/Output Summary (Last 24 hours) at 2021 0717  Last data filed at 2021 0700  Gross per 24 hour   Intake 4391.91 ml   Output 3170 ml   Net 1221.91 ml         Current Medications:    doxazosin  1 mg Oral or Feeding Tube Daily     famotidine  20 mg Oral or Feeding Tube BID     heparin ANTICOAGULANT  5,000 Units Subcutaneous Q8H     insulin aspart  1-12 Units Subcutaneous Q4H     niMODipine  60 mg Oral or Feeding Tube Q4H MARAH    And     sodium chloride 0.9 %  10 mL Oral or Feeding Tube Q4H MARAH     piperacillin-tazobactam  4.5 g Intravenous Q6H     polyethylene glycol  17 g Oral or Feeding Tube Daily     potassium chloride  20 mEq Oral or Feeding Tube Once     potassium chloride  20 mEq Oral or Feeding Tube BID     protein modular  1 packet Per Feeding Tube TID      "QUEtiapine  50 mg Oral or Feeding Tube BID     senna-docusate  1 tablet Oral or Feeding Tube BID    Or     senna-docusate  2 tablet Oral or Feeding Tube BID       PRN Medications:  acetaminophen, bisacodyl, glucose **OR** dextrose **OR** glucagon, fentaNYL, hydrALAZINE, labetalol, naloxone **OR** naloxone **OR** naloxone **OR** naloxone, ondansetron **OR** ondansetron, prochlorperazine **OR** prochlorperazine **OR** prochlorperazine, QUEtiapine, sodium chloride    Infusions:    dexmedetomidine Stopped (05/26/21 0655)     propofol (DIPRIVAN) infusion 30 mcg/kg/min (05/26/21 0714)       No Known Allergies    Physical Examination:  Vitals: BP (!) 80/56   Pulse 69   Temp 98.6  F (37  C) (Axillary)   Resp 13   Ht 1.803 m (5' 11\")   Wt 69.7 kg (153 lb 10.6 oz)   SpO2 99%   BMI 21.43 kg/m    General: Adult male patient, lying in bed, NAD  HEENT: Normocephalic/Atraumatic, no icterus, Oral cavity/Oropharynx pink and moist  Cardiac: RRR  Chest: No SOB, pattern regular, unlabored, expansion symmetric, no retractions or use of accessory muscles, assisted mechanically   Abdomen: Soft, bowel sounds present  Extremities: Warm, no edema  Skin: No rash or lesion   Psych: Restless, agitation   Neuro:  Mental status: Sedated, intubated, able to track examiner, intermittently follows commands, moves all four extremities spontaneously antigravity.  Cranial nerves: PERRL, conjugate gaze, pupils 3mm round and reactive, cough and gag intact with deep suctioning.   Motor: Normal bulk and tone. No abnormal movements. 3/5 strength in bilateral upper and lower extremities, moves all limbs spontaneously antigravity  Sensory: Withdraws to noxious stimuli in all four extremities  Coordination: deferred due to acuity  Gait: deferred due to acuity      Imaging:  I personally reviewed on labs and imaging in EMR.      Assessment/Plan  #Subarachnoid hemorrhage (MF4, HH5), aneurysmal; PBD11 HH5, mF4; POD11 R PCOM clipping;  - Neuro checks q2h  - " SBP < 200  - Nimodipine 60 mg q4h  - Daily TCD, yesterday L BAYLEE elevated, CTA-P difficult to interpret; CTA read with R A2 segment spasm/narrowing although of unknown significance. Clinically appeared stable. Decision was made for no angiogram.  - HOB > 30 degrees;   - CTP- R temporal reduced CBV/CBF     #IVH with subsequent hydrocephalus   #Brain compression with midline shift R>L  EVD placed 5/15  - EVD raised this AM to 10' open,   - Was at 5, ICP 5-9, drained 202 yesterday, 91cc since midnight;      #Agitation  - Seroquel 50 mg BID  - Seroquel 12.5 mg BID PRN   - Precedex gtt currently @ 1; wean as able    #Analgesics  - Tylenol 650 mg q4h PRN   - Fentanyl bumps 25-50 mcg q2h PRN     CV:  #HTN  ECHO completed on 5/17; EF 60-65%  - SBP wide range in setting suctioning and agitation with hypoxia  - SBP < 200  - Holding PTA Lisinopril  - Hydralazine and Labetalol PRN  -  5/24     Pulm:  #Acute hypoxic respiratory failure  Re-intubated 5/19  - CXR for tomororw  - Albuterol nebulizer 2.5q6 + chest PT  - Stopped 3%; Add mucomyst 4mlq6  - ABG after turning in one hour    #concern for PE w/persistent hypoxia and Large AA gradient (>300)  - CT-PE pending read  - no utility in d-dimer in setting of active infection  - repeat CXR tomorrow  - increase pulmonary hygiene, and repeat ABG after repositioning      Ventilation Mode: CMV/AC  (Continuous Mandatory Ventilation/ Assist Control)  FiO2 (%): 60 %  Rate Set (breaths/minute): 14 breaths/min  Tidal Volume Set (mL): 500 mL  PEEP (cm H2O): 5 cmH2O  Pressure Support (cm H2O): 7 cmH2O  Oxygen Concentration (%): 60 %  Peak Inspiratory Pressure (cm H2O) (Drager Marifer): 16  Resp: 14       GI/Nutrition:  #Severe malnutrition in the context of acute on chronic illness  - Protein modular 1 Pkt  - Multivitamins w/minerals  - Diet: TF's @ goal (60)  - NG   - continue rectal tube given high output  - Hold senna given increase stool output    Renal:    I/o: even yesterday;  Overall ~+7L; likely roughly net even given diaphoresis    #Hyperchloremia; 111  -  ml q2h    #Urinary retention  - Doxazosin 1 mg daily      - IV Fluids: None  - BMP daily  - Electrolyte replacement protocol in place     Endo:  #Hyperglycemia   - Hgb A1c 5.5  - Follow glucose levels     Heme:  #Leukocytosis  #Normocyctic anemia  - CBC daily     ID:  #afebrile overnight; tmax 99.8  #WBC 19.2 from 20.7  #Aspiration pneumonia  #NGTD; gm stain with GNRs; 5/22 sputum with moderate growth pseudomonas, klebsiella  - 5/19 Sputum culture with heavy growth Klebsiella oxytoca; Heavy growth Staphylococcus epidermidis; Heavy growth beta hemolytic Streptococcus constellatus   - Sputum 5/22--with mod growth of Pseudomonas aeruginosa   - Ceftriaxone (5/21-5/23) was switched to Zosyn (5/23-present) for 7 day course  - BC 5/19, 5/22 with NGTD  - panculture; so far GM stain with expected GNRs  - Follow fever curve  - UA negative     PPX:  DVT Prophylaxis  - SCDs in place  - Heparin subcutaneous    GI Prophylaxis  - Famotidine 20 mg BID      Lines/Tubes/Drains:  - PIV x 1  - L subclavian CVC/TL 5/15  - NG  - EVD  - Rectal tube  - Continue to condom catheter     Code Status: Full code     Dispo: ICU    The patient was seen and discussed with the attending, Dr. Casillas.    David Sands MD  Neuro ICU Resident PGY-1

## 2021-05-27 ENCOUNTER — APPOINTMENT (OUTPATIENT)
Dept: GENERAL RADIOLOGY | Facility: CLINIC | Age: 71
DRG: 003 | End: 2021-05-27
Attending: PSYCHIATRY & NEUROLOGY
Payer: COMMERCIAL

## 2021-05-27 ENCOUNTER — APPOINTMENT (OUTPATIENT)
Dept: CARDIOLOGY | Facility: CLINIC | Age: 71
DRG: 003 | End: 2021-05-27
Attending: PSYCHIATRY & NEUROLOGY
Payer: COMMERCIAL

## 2021-05-27 ENCOUNTER — APPOINTMENT (OUTPATIENT)
Dept: ULTRASOUND IMAGING | Facility: CLINIC | Age: 71
DRG: 003 | End: 2021-05-27
Attending: STUDENT IN AN ORGANIZED HEALTH CARE EDUCATION/TRAINING PROGRAM
Payer: COMMERCIAL

## 2021-05-27 LAB
ABO + RH BLD: NORMAL
ABO + RH BLD: NORMAL
ANION GAP SERPL CALCULATED.3IONS-SCNC: <1 MMOL/L (ref 3–14)
BACTERIA SPEC CULT: ABNORMAL
BACTERIA SPEC CULT: NO GROWTH
BASE EXCESS BLDA CALC-SCNC: 3.7 MMOL/L
BASE EXCESS BLDA CALC-SCNC: 3.7 MMOL/L
BASE EXCESS BLDA CALC-SCNC: 5.3 MMOL/L
BASE EXCESS BLDA CALC-SCNC: 6 MMOL/L
BASE EXCESS BLDA CALC-SCNC: 6.3 MMOL/L
BLD GP AB SCN SERPL QL: NORMAL
BLOOD BANK CMNT PATIENT-IMP: NORMAL
BUN SERPL-MCNC: 19 MG/DL (ref 7–30)
C DIFF TOX B STL QL: NEGATIVE
CALCIUM SERPL-MCNC: 8.5 MG/DL (ref 8.5–10.1)
CHLORIDE SERPL-SCNC: 108 MMOL/L (ref 94–109)
CO2 SERPL-SCNC: 32 MMOL/L (ref 20–32)
CREAT SERPL-MCNC: 0.6 MG/DL (ref 0.66–1.25)
ERYTHROCYTE [DISTWIDTH] IN BLOOD BY AUTOMATED COUNT: 12.8 % (ref 10–15)
GFR SERPL CREATININE-BSD FRML MDRD: >90 ML/MIN/{1.73_M2}
GLUCOSE BLDC GLUCOMTR-MCNC: 150 MG/DL (ref 70–99)
GLUCOSE BLDC GLUCOMTR-MCNC: 165 MG/DL (ref 70–99)
GLUCOSE BLDC GLUCOMTR-MCNC: 168 MG/DL (ref 70–99)
GLUCOSE BLDC GLUCOMTR-MCNC: 168 MG/DL (ref 70–99)
GLUCOSE BLDC GLUCOMTR-MCNC: 170 MG/DL (ref 70–99)
GLUCOSE BLDC GLUCOMTR-MCNC: 178 MG/DL (ref 70–99)
GLUCOSE BLDC GLUCOMTR-MCNC: 184 MG/DL (ref 70–99)
GLUCOSE SERPL-MCNC: 169 MG/DL (ref 70–99)
HCO3 BLD-SCNC: 29 MMOL/L (ref 21–28)
HCO3 BLD-SCNC: 30 MMOL/L (ref 21–28)
HCO3 BLD-SCNC: 31 MMOL/L (ref 21–28)
HCT VFR BLD AUTO: 28.1 % (ref 40–53)
HGB BLD-MCNC: 8.9 G/DL (ref 13.3–17.7)
Lab: ABNORMAL
MAGNESIUM SERPL-MCNC: 2.4 MG/DL (ref 1.6–2.3)
MCH RBC QN AUTO: 30.6 PG (ref 26.5–33)
MCHC RBC AUTO-ENTMCNC: 31.7 G/DL (ref 31.5–36.5)
MCV RBC AUTO: 97 FL (ref 78–100)
O2/TOTAL GAS SETTING VFR VENT: 50 %
O2/TOTAL GAS SETTING VFR VENT: 60 %
O2/TOTAL GAS SETTING VFR VENT: 60 %
OXYHGB MFR BLD: 94 % (ref 92–100)
OXYHGB MFR BLD: 95 % (ref 92–100)
OXYHGB MFR BLD: 95 % (ref 92–100)
OXYHGB MFR BLD: 96 % (ref 92–100)
PCO2 BLD: 42 MM HG (ref 35–45)
PCO2 BLD: 44 MM HG (ref 35–45)
PCO2 BLD: 45 MM HG (ref 35–45)
PCO2 BLD: 49 MM HG (ref 35–45)
PCO2 BLD: 50 MM HG (ref 35–45)
PH BLD: 7.38 PH (ref 7.35–7.45)
PH BLD: 7.39 PH (ref 7.35–7.45)
PH BLD: 7.44 PH (ref 7.35–7.45)
PH BLD: 7.45 PH (ref 7.35–7.45)
PH BLD: 7.47 PH (ref 7.35–7.45)
PHOSPHATE SERPL-MCNC: 4.1 MG/DL (ref 2.5–4.5)
PLATELET # BLD AUTO: 549 10E9/L (ref 150–450)
PO2 BLD: 70 MM HG (ref 80–105)
PO2 BLD: 78 MM HG (ref 80–105)
PO2 BLD: 81 MM HG (ref 80–105)
PO2 BLD: 84 MM HG (ref 80–105)
PO2 BLD: 84 MM HG (ref 80–105)
POTASSIUM SERPL-SCNC: 4.4 MMOL/L (ref 3.4–5.3)
RBC # BLD AUTO: 2.91 10E12/L (ref 4.4–5.9)
SODIUM SERPL-SCNC: 140 MMOL/L (ref 133–144)
SPECIMEN EXP DATE BLD: NORMAL
SPECIMEN SOURCE: ABNORMAL
SPECIMEN SOURCE: NORMAL
SPECIMEN SOURCE: NORMAL
WBC # BLD AUTO: 28.5 10E9/L (ref 4–11)

## 2021-05-27 PROCEDURE — 93886 INTRACRANIAL COMPLETE STUDY: CPT | Mod: 26 | Performed by: RADIOLOGY

## 2021-05-27 PROCEDURE — 250N000013 HC RX MED GY IP 250 OP 250 PS 637: Performed by: STUDENT IN AN ORGANIZED HEALTH CARE EDUCATION/TRAINING PROGRAM

## 2021-05-27 PROCEDURE — 93308 TTE F-UP OR LMTD: CPT | Mod: 26 | Performed by: INTERNAL MEDICINE

## 2021-05-27 PROCEDURE — 94003 VENT MGMT INPAT SUBQ DAY: CPT

## 2021-05-27 PROCEDURE — 86900 BLOOD TYPING SEROLOGIC ABO: CPT | Performed by: NEUROLOGICAL SURGERY

## 2021-05-27 PROCEDURE — 93886 INTRACRANIAL COMPLETE STUDY: CPT

## 2021-05-27 PROCEDURE — 82805 BLOOD GASES W/O2 SATURATION: CPT | Performed by: NEUROLOGICAL SURGERY

## 2021-05-27 PROCEDURE — 71045 X-RAY EXAM CHEST 1 VIEW: CPT | Mod: 26 | Performed by: RADIOLOGY

## 2021-05-27 PROCEDURE — 258N000003 HC RX IP 258 OP 636: Performed by: STUDENT IN AN ORGANIZED HEALTH CARE EDUCATION/TRAINING PROGRAM

## 2021-05-27 PROCEDURE — 250N000011 HC RX IP 250 OP 636: Performed by: STUDENT IN AN ORGANIZED HEALTH CARE EDUCATION/TRAINING PROGRAM

## 2021-05-27 PROCEDURE — 94640 AIRWAY INHALATION TREATMENT: CPT | Mod: 76

## 2021-05-27 PROCEDURE — 999N000078 HC STATISTIC INTRAPULMONARY PERCUSSIVE VENT

## 2021-05-27 PROCEDURE — 87493 C DIFF AMPLIFIED PROBE: CPT | Performed by: STUDENT IN AN ORGANIZED HEALTH CARE EDUCATION/TRAINING PROGRAM

## 2021-05-27 PROCEDURE — 71045 X-RAY EXAM CHEST 1 VIEW: CPT

## 2021-05-27 PROCEDURE — 200N000002 HC R&B ICU UMMC

## 2021-05-27 PROCEDURE — 999N000157 HC STATISTIC RCP TIME EA 10 MIN

## 2021-05-27 PROCEDURE — 999N000076 HC STATISTIC ICP MONITORING

## 2021-05-27 PROCEDURE — 250N000011 HC RX IP 250 OP 636: Performed by: PSYCHIATRY & NEUROLOGY

## 2021-05-27 PROCEDURE — 250N000009 HC RX 250: Performed by: PSYCHIATRY & NEUROLOGY

## 2021-05-27 PROCEDURE — 93321 DOPPLER ECHO F-UP/LMTD STD: CPT | Mod: 26 | Performed by: INTERNAL MEDICINE

## 2021-05-27 PROCEDURE — 93325 DOPPLER ECHO COLOR FLOW MAPG: CPT

## 2021-05-27 PROCEDURE — 36620 INSERTION CATHETER ARTERY: CPT | Mod: GC | Performed by: PSYCHIATRY & NEUROLOGY

## 2021-05-27 PROCEDURE — 80048 BASIC METABOLIC PNL TOTAL CA: CPT | Performed by: STUDENT IN AN ORGANIZED HEALTH CARE EDUCATION/TRAINING PROGRAM

## 2021-05-27 PROCEDURE — 86850 RBC ANTIBODY SCREEN: CPT | Performed by: NEUROLOGICAL SURGERY

## 2021-05-27 PROCEDURE — 250N000013 HC RX MED GY IP 250 OP 250 PS 637: Performed by: NEUROLOGICAL SURGERY

## 2021-05-27 PROCEDURE — 999N001017 HC STATISTIC GLUCOSE BY METER IP

## 2021-05-27 PROCEDURE — 250N000013 HC RX MED GY IP 250 OP 250 PS 637: Performed by: NURSE PRACTITIONER

## 2021-05-27 PROCEDURE — 250N000011 HC RX IP 250 OP 636: Performed by: NURSE PRACTITIONER

## 2021-05-27 PROCEDURE — 94640 AIRWAY INHALATION TREATMENT: CPT

## 2021-05-27 PROCEDURE — 82803 BLOOD GASES ANY COMBINATION: CPT | Performed by: STUDENT IN AN ORGANIZED HEALTH CARE EDUCATION/TRAINING PROGRAM

## 2021-05-27 PROCEDURE — 99291 CRITICAL CARE FIRST HOUR: CPT | Mod: 25 | Performed by: PSYCHIATRY & NEUROLOGY

## 2021-05-27 PROCEDURE — 999N000065 XR CHEST PORT 1 VIEW

## 2021-05-27 PROCEDURE — 250N000009 HC RX 250: Performed by: STUDENT IN AN ORGANIZED HEALTH CARE EDUCATION/TRAINING PROGRAM

## 2021-05-27 PROCEDURE — 86901 BLOOD TYPING SEROLOGIC RH(D): CPT | Performed by: NEUROLOGICAL SURGERY

## 2021-05-27 PROCEDURE — 94681 O2 UPTK CO2 OUTP % O2 XTRC: CPT

## 2021-05-27 PROCEDURE — 93325 DOPPLER ECHO COLOR FLOW MAPG: CPT | Mod: 26 | Performed by: INTERNAL MEDICINE

## 2021-05-27 PROCEDURE — 85027 COMPLETE CBC AUTOMATED: CPT | Performed by: STUDENT IN AN ORGANIZED HEALTH CARE EDUCATION/TRAINING PROGRAM

## 2021-05-27 PROCEDURE — 84100 ASSAY OF PHOSPHORUS: CPT | Performed by: STUDENT IN AN ORGANIZED HEALTH CARE EDUCATION/TRAINING PROGRAM

## 2021-05-27 PROCEDURE — 83735 ASSAY OF MAGNESIUM: CPT | Performed by: STUDENT IN AN ORGANIZED HEALTH CARE EDUCATION/TRAINING PROGRAM

## 2021-05-27 RX ORDER — ACETYLCYSTEINE 100 MG/ML
4 SOLUTION ORAL; RESPIRATORY (INHALATION) EVERY 4 HOURS
Status: CANCELLED | OUTPATIENT
Start: 2021-05-27

## 2021-05-27 RX ORDER — PIPERACILLIN SODIUM, TAZOBACTAM SODIUM 4; .5 G/20ML; G/20ML
4.5 INJECTION, POWDER, LYOPHILIZED, FOR SOLUTION INTRAVENOUS EVERY 6 HOURS
Status: DISCONTINUED | OUTPATIENT
Start: 2021-05-27 | End: 2021-05-28

## 2021-05-27 RX ORDER — IPRATROPIUM BROMIDE AND ALBUTEROL SULFATE 2.5; .5 MG/3ML; MG/3ML
3 SOLUTION RESPIRATORY (INHALATION)
Status: DISCONTINUED | OUTPATIENT
Start: 2021-05-27 | End: 2021-06-11 | Stop reason: HOSPADM

## 2021-05-27 RX ORDER — FUROSEMIDE 10 MG/ML
40 INJECTION INTRAMUSCULAR; INTRAVENOUS ONCE
Status: COMPLETED | OUTPATIENT
Start: 2021-05-27 | End: 2021-05-27

## 2021-05-27 RX ORDER — ALBUTEROL SULFATE 0.83 MG/ML
2.5 SOLUTION RESPIRATORY (INHALATION) EVERY 4 HOURS
Status: CANCELLED | OUTPATIENT
Start: 2021-05-27

## 2021-05-27 RX ADMIN — INSULIN ASPART 2 UNITS: 100 INJECTION, SOLUTION INTRAVENOUS; SUBCUTANEOUS at 19:53

## 2021-05-27 RX ADMIN — ACETYLCYSTEINE 4 ML: 100 SOLUTION ORAL; RESPIRATORY (INHALATION) at 02:22

## 2021-05-27 RX ADMIN — PIPERACILLIN SODIUM AND TAZOBACTAM SODIUM 4.5 G: 4; .5 INJECTION, POWDER, LYOPHILIZED, FOR SOLUTION INTRAVENOUS at 10:17

## 2021-05-27 RX ADMIN — PIPERACILLIN SODIUM AND TAZOBACTAM SODIUM 4.5 G: 4; .5 INJECTION, POWDER, LYOPHILIZED, FOR SOLUTION INTRAVENOUS at 04:06

## 2021-05-27 RX ADMIN — INSULIN ASPART 2 UNITS: 100 INJECTION, SOLUTION INTRAVENOUS; SUBCUTANEOUS at 00:33

## 2021-05-27 RX ADMIN — POTASSIUM CHLORIDE 20 MEQ: 1.5 POWDER, FOR SOLUTION ORAL at 19:53

## 2021-05-27 RX ADMIN — PIPERACILLIN SODIUM AND TAZOBACTAM SODIUM 4.5 G: 4; .5 INJECTION, POWDER, LYOPHILIZED, FOR SOLUTION INTRAVENOUS at 15:57

## 2021-05-27 RX ADMIN — ACETAMINOPHEN 650 MG: 325 TABLET, FILM COATED ORAL at 16:25

## 2021-05-27 RX ADMIN — ACETYLCYSTEINE 4 ML: 100 SOLUTION ORAL; RESPIRATORY (INHALATION) at 09:01

## 2021-05-27 RX ADMIN — Medication 10 ML: at 03:54

## 2021-05-27 RX ADMIN — Medication 1 PACKET: at 08:27

## 2021-05-27 RX ADMIN — PIPERACILLIN SODIUM AND TAZOBACTAM SODIUM 4.5 G: 4; .5 INJECTION, POWDER, LYOPHILIZED, FOR SOLUTION INTRAVENOUS at 22:05

## 2021-05-27 RX ADMIN — PROPOFOL 40 MCG/KG/MIN: 10 INJECTION, EMULSION INTRAVENOUS at 00:41

## 2021-05-27 RX ADMIN — NIMODIPINE 60 MG: 30 SOLUTION ORAL at 23:36

## 2021-05-27 RX ADMIN — FENTANYL CITRATE 50 MCG: 50 INJECTION, SOLUTION INTRAMUSCULAR; INTRAVENOUS at 16:00

## 2021-05-27 RX ADMIN — NIMODIPINE 60 MG: 30 SOLUTION ORAL at 15:57

## 2021-05-27 RX ADMIN — Medication 10 ML: at 00:22

## 2021-05-27 RX ADMIN — Medication 10 ML: at 19:52

## 2021-05-27 RX ADMIN — PROPOFOL 50 MCG/KG/MIN: 10 INJECTION, EMULSION INTRAVENOUS at 22:05

## 2021-05-27 RX ADMIN — SODIUM CHLORIDE SOLN NEBU 3% 3 ML: 3 NEBU SOLN at 02:24

## 2021-05-27 RX ADMIN — NIMODIPINE 60 MG: 30 SOLUTION ORAL at 08:26

## 2021-05-27 RX ADMIN — FENTANYL CITRATE 50 MCG: 50 INJECTION, SOLUTION INTRAMUSCULAR; INTRAVENOUS at 12:45

## 2021-05-27 RX ADMIN — ALBUTEROL SULFATE 2.5 MG: 2.5 SOLUTION RESPIRATORY (INHALATION) at 02:22

## 2021-05-27 RX ADMIN — INSULIN ASPART 2 UNITS: 100 INJECTION, SOLUTION INTRAVENOUS; SUBCUTANEOUS at 16:00

## 2021-05-27 RX ADMIN — IPRATROPIUM BROMIDE AND ALBUTEROL SULFATE 3 ML: 2.5; .5 SOLUTION RESPIRATORY (INHALATION) at 20:12

## 2021-05-27 RX ADMIN — NIMODIPINE 60 MG: 30 SOLUTION ORAL at 11:56

## 2021-05-27 RX ADMIN — Medication 1 PACKET: at 14:16

## 2021-05-27 RX ADMIN — ALBUTEROL SULFATE 2.5 MG: 2.5 SOLUTION RESPIRATORY (INHALATION) at 09:01

## 2021-05-27 RX ADMIN — Medication 10 ML: at 15:58

## 2021-05-27 RX ADMIN — NIMODIPINE 60 MG: 30 SOLUTION ORAL at 00:22

## 2021-05-27 RX ADMIN — PROPOFOL 30 MCG/KG/MIN: 10 INJECTION, EMULSION INTRAVENOUS at 06:37

## 2021-05-27 RX ADMIN — DOXAZOSIN 1 MG: 1 TABLET ORAL at 08:26

## 2021-05-27 RX ADMIN — Medication 10 ML: at 23:41

## 2021-05-27 RX ADMIN — DEXMEDETOMIDINE 1.2 MCG/KG/HR: 100 INJECTION, SOLUTION, CONCENTRATE INTRAVENOUS at 03:43

## 2021-05-27 RX ADMIN — INSULIN ASPART 1 UNITS: 100 INJECTION, SOLUTION INTRAVENOUS; SUBCUTANEOUS at 23:38

## 2021-05-27 RX ADMIN — HEPARIN SODIUM 5000 UNITS: 5000 INJECTION, SOLUTION INTRAVENOUS; SUBCUTANEOUS at 23:36

## 2021-05-27 RX ADMIN — INSULIN ASPART 2 UNITS: 100 INJECTION, SOLUTION INTRAVENOUS; SUBCUTANEOUS at 08:27

## 2021-05-27 RX ADMIN — INSULIN ASPART 2 UNITS: 100 INJECTION, SOLUTION INTRAVENOUS; SUBCUTANEOUS at 03:53

## 2021-05-27 RX ADMIN — HEPARIN SODIUM 5000 UNITS: 5000 INJECTION, SOLUTION INTRAVENOUS; SUBCUTANEOUS at 00:20

## 2021-05-27 RX ADMIN — POTASSIUM CHLORIDE 20 MEQ: 1.5 POWDER, FOR SOLUTION ORAL at 11:56

## 2021-05-27 RX ADMIN — NIMODIPINE 60 MG: 30 SOLUTION ORAL at 03:54

## 2021-05-27 RX ADMIN — PROPOFOL 30 MCG/KG/MIN: 10 INJECTION, EMULSION INTRAVENOUS at 12:52

## 2021-05-27 RX ADMIN — NIMODIPINE 60 MG: 30 SOLUTION ORAL at 19:52

## 2021-05-27 RX ADMIN — DEXMEDETOMIDINE 1.2 MCG/KG/HR: 100 INJECTION, SOLUTION, CONCENTRATE INTRAVENOUS at 08:51

## 2021-05-27 RX ADMIN — ACETAMINOPHEN 650 MG: 325 TABLET, FILM COATED ORAL at 00:20

## 2021-05-27 RX ADMIN — FUROSEMIDE 40 MG: 10 INJECTION, SOLUTION INTRAMUSCULAR; INTRAVENOUS at 11:56

## 2021-05-27 RX ADMIN — FAMOTIDINE 20 MG: 20 TABLET ORAL at 08:26

## 2021-05-27 RX ADMIN — Medication 10 ML: at 11:56

## 2021-05-27 RX ADMIN — INSULIN ASPART 2 UNITS: 100 INJECTION, SOLUTION INTRAVENOUS; SUBCUTANEOUS at 12:08

## 2021-05-27 RX ADMIN — HEPARIN SODIUM 5000 UNITS: 5000 INJECTION, SOLUTION INTRAVENOUS; SUBCUTANEOUS at 08:27

## 2021-05-27 RX ADMIN — PROPOFOL 50 MCG/KG/MIN: 10 INJECTION, EMULSION INTRAVENOUS at 17:54

## 2021-05-27 RX ADMIN — Medication 10 ML: at 08:26

## 2021-05-27 RX ADMIN — QUETIAPINE 50 MG: 50 TABLET ORAL at 08:26

## 2021-05-27 RX ADMIN — Medication 1 PACKET: at 19:53

## 2021-05-27 RX ADMIN — FAMOTIDINE 20 MG: 20 TABLET ORAL at 19:52

## 2021-05-27 RX ADMIN — QUETIAPINE 50 MG: 50 TABLET ORAL at 19:52

## 2021-05-27 RX ADMIN — IPRATROPIUM BROMIDE AND ALBUTEROL SULFATE 3 ML: 2.5; .5 SOLUTION RESPIRATORY (INHALATION) at 13:16

## 2021-05-27 RX ADMIN — HEPARIN SODIUM 5000 UNITS: 5000 INJECTION, SOLUTION INTRAVENOUS; SUBCUTANEOUS at 15:57

## 2021-05-27 ASSESSMENT — ACTIVITIES OF DAILY LIVING (ADL)
ADLS_ACUITY_SCORE: 22

## 2021-05-27 ASSESSMENT — MIFFLIN-ST. JEOR: SCORE: 1498.13

## 2021-05-27 NOTE — PROGRESS NOTES
Neurosurgery progress note    S: no acute events overnight    O:  Temp:  [99.2  F (37.3  C)-100.6  F (38.1  C)] 99.2  F (37.3  C)  Pulse:  [] 79  Resp:  [17-35] 23  BP: (132)/(83) 132/83  MAP:  [64 mmHg-96 mmHg] 92 mmHg  Arterial Line BP: (100-172)/(51-70) 168/63  FiO2 (%):  [50 %-60 %] 60 %  SpO2:  [93 %-99 %] 98 %    Exam:  Incision: c/d/i, drain and EVD in place  Intubated and heavily sedated  PERRL  No movement to noxious stimili    Imaging  No new imaging      ASSESSMENT: 70 year old man presented with AMS found to have aSAH with right supraclinoid aneurysm. mFS4 and HH5. GCS 5. S/p EVD placement, DCA,s/p aneurysm clipping on 5/16.     The following conditions are also present, complicating the patient's current management, as described in the Assessment and Plan:   mechanical ventilation on day of admission, intracerebral hematoma, brain compression, acute respiratory failure, coma, based on current GCS of 5 and cerebral aneurysm rupture with hunt sims scale 5 modified lowery 4     Hypokalemia   Severe malnutrition in the context of acute on chronic illness  Respiratory failure  Pneumonia     RECOMMENDATIONS:  - EVD: 10 open  - Q1hr neuro exam  - SBP<200  - HOB > 30 degrees  - wean sedation as possible  - follow-up clx  -- zosyn for PNA  - Continuous cardiac monitoring while in ICU   - Nimodipine  - daily TCDs  - Glucose < 180  - Continue glucose checks  - Platelets > 100,000  - INR < 1.5  - Hemoglobin > 8  - DVT: SCDs while in bed  - Disposition: 4A  - PT/OT consulted        Samuel Nolasco MD, PhD  Neurosurgery Resident PGY-2    Please contact neurosurgery resident on call with questions.    Dial * * [phone], enter [phone] when prompted.

## 2021-05-27 NOTE — PLAN OF CARE
PT 4A: Cancel- Patient sedated and not appropriate for therapy 2/2 worsening respiratory status requiring increased FIO2 and PEEP 14. Will monitor patient status and re-initiate as appropriate.

## 2021-05-27 NOTE — PROCEDURES
[facility]    Arterial line placement    Date/Time: 5/27/2021 7:54 AM  Performed by: Magui Mckenzie MD  Authorized by: Magui Mckenzie MD     UNIVERSAL PROTOCOL   Site Marked: Yes  Prior Images Obtained and Reviewed:  Yes  Required items: Required blood products, implants, devices and special equipment available    Patient identity confirmed:  Arm band  NA - No sedation, light sedation, or local anesthesia  Confirmation Checklist:  Patient's identity using two indicators and relevant allergies  Time out: Immediately prior to the procedure a time out was called    Universal Protocol: the Joint Commission Universal Protocol was followed    Preparation: Patient was prepped and draped in usual sterile fashion        Indication: multiple ABGs respiratory failure hemodynamic monitoring  Location:  Right radial      PROCEDURE DETAILS            Post-procedure:  Line sutured and dressing applied    PROCEDURE   Patient Tolerance:  Patient tolerated the procedure well with no immediate complications    Length of time physician/provider present for 1:1 monitoring during sedation: 0 (Attending present and supervising for entirity of procedure )      Magui Mckenzie MD  PGY2 Neurology

## 2021-05-27 NOTE — PLAN OF CARE
Neuro: Sedated. Withdraws in all extremities intermittently all together or uppers vs lowers. Wax/weans. MD aware. Pupils brisk +3 equal. T max 100.6. Tylenol given x1. Neuros q 2 hrs. EVD 10 above EAM. ICP 5-8. Output 1-20, CSF ranjit red color. RASS -4  Cardiac: Sinus Rhythm. HR 60-90s. SBP < 200 without intervention.   Respiratory: CMV . Peep 7. Resp 14. Patient would overbreathe into upper 20s to 30s. PIPS teens-20s. Small amount of tan thick secretions via ETT.  GI: Rectal tube patent. Active bowel sounds. TF at goal of 60 with  q 2hrs.   : Voiding spontaneously. Urine yellow and clear.   IV: L subclavian, R arm piv  Gtts: Prop @ 50, Fent @ 75, TKO   Skin:  R crani incision intact with staples.   PLAN: Continue with current plan of care. Notify MD with any acute changes.

## 2021-05-27 NOTE — PROGRESS NOTES
Neuroscience Intensive Care Progress Note  2021    Problem List  1.  Aneurysmal subarachnoid hemorrhage, HH 5, MF 4, bleed day 5/15  2.  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  Hydrocephalus status post EVD 5/15  4.  Hypertension  5.  Acute hypoxic respiratory failure  6.  Hypernatremia  7.  Hyperchloremia  8.  Leukocytosis  9.  Normocytic anemia    AE  - dropping O2 sats; with increased secretions and malpositioned ETT, replaced, with improvement in O2 sats.     Temp: 98.1  F (36.7  C) Temp  Min: 98  F (36.7  C)  Max: 99.6  F (37.6  C)  Resp: 26 Resp  Min: 13  Max: 36  SpO2: 96 % SpO2  Min: 85 %  Max: 100 %  Pulse: 81 Pulse  Min: 59  Max: 98    No data recorded  BP: 135/84 Systolic (24hrs), Av , Min:96 , Max:199   Diastolic (24hrs), Av, Min:62, Max:126      Ventilator Settings  Ventilation Mode: CMV/AC  (Continuous Mandatory Ventilation/ Assist Control)  FiO2 (%): 50 %  Rate Set (breaths/minute): 14 breaths/min  Tidal Volume Set (mL): 450 mL  PEEP (cm H2O): 14 cmH2O  Oxygen Concentration (%): 60 %  Peak Inspiratory Pressure (cm H2O) (Drager Marifer): 16  Resp: 26          Intake/Output Summary (Last 24 hours) at 2021 0717  Last data filed at 2021 0700  Gross per 24 hour   Intake 4391.91 ml   Output 3170 ml   Net 1221.91 ml         Current Medications:    acetylcysteine  4 mL Nebulization Q6H     albuterol  2.5 mg Nebulization Q6H     doxazosin  1 mg Oral or Feeding Tube Daily     famotidine  20 mg Oral or Feeding Tube BID     heparin ANTICOAGULANT  5,000 Units Subcutaneous Q8H     insulin aspart  1-12 Units Subcutaneous Q4H     niMODipine  60 mg Oral or Feeding Tube Q4H MARAH    And     sodium chloride 0.9 %  10 mL Oral or Feeding Tube Q4H MARAH     piperacillin-tazobactam  4.5 g Intravenous Q6H     polyethylene glycol  17 g Oral or Feeding Tube Daily     potassium chloride  20 mEq Oral or Feeding Tube BID     protein modular  1 packet Per Feeding Tube TID     QUEtiapine  50  "mg Oral or Feeding Tube BID     [Held by provider] senna-docusate  1 tablet Oral or Feeding Tube BID    Or     [Held by provider] senna-docusate  2 tablet Oral or Feeding Tube BID     sodium chloride  3 mL Nebulization Q6H       PRN Medications:  acetaminophen, bisacodyl, glucose **OR** dextrose **OR** glucagon, fentaNYL, fentaNYL, flumazenil, heparin, heparin, hydrALAZINE, labetalol, lidocaine (buffered or not buffered), midazolam, naloxone **OR** naloxone **OR** naloxone **OR** naloxone, ondansetron **OR** ondansetron, prochlorperazine **OR** prochlorperazine **OR** prochlorperazine, QUEtiapine    Infusions:    dexmedetomidine 1.2 mcg/kg/hr (05/27/21 0400)     fentaNYL 50 mcg/hr (05/27/21 0400)     heparin       heparin       propofol (DIPRIVAN) infusion 30 mcg/kg/min (05/27/21 0637)       No Known Allergies    Physical Examination:  Vitals: /84   Pulse 81   Temp 98.1  F (36.7  C) (Axillary)   Resp 26   Ht 1.803 m (5' 11\")   Wt 70.6 kg (155 lb 10.3 oz)   SpO2 96%   BMI 21.71 kg/m    General: Adult male patient, lying in bed, NAD  HEENT: Normocephalic/Atraumatic, no icterus, Oral cavity/Oropharynx pink and moist  Cardiac: RRR  Chest: No SOB, pattern regular, unlabored, expansion symmetric, no retractions or use of accessory muscles, assisted mechanically   Abdomen: Soft, bowel sounds present  Extremities: Warm, no edema  Skin: No rash or lesion   Psych: Restless, agitation   Neuro:  Mental status: Sedated, intubated, able to track examiner, intermittently follows commands, moves all four extremities spontaneously antigravity.  Cranial nerves: PERRL, conjugate gaze, pupils 3mm round and reactive, cough and gag intact with deep suctioning.   Motor: Normal bulk and tone. No abnormal movements. 3/5 strength in bilateral upper and lower extremities, moves all limbs spontaneously antigravity  Sensory: Withdraws to noxious stimuli in all four extremities  Coordination: deferred due to acuity  Gait: deferred " due to acuity      Imaging:  I personally reviewed on labs and imaging in EMR.    In brief  1. ETT advanced ~ 2cm  2. Repeat ABG showing improvement  3. TCDs wnl  4. Lasix 40mg x 1 for goal -1.5L; repeat CXR tmr AM        Assessment/Plan  #Subarachnoid hemorrhage (MF4, HH5), aneurysmal; PBD12 HH5, mF4; POD12 R PCOM clipping;  - Neuro checks q2h  - SBP < 200  - Nimodipine 60 mg q4h  - Daily TCD, yesterday L BAYLEE elevated, CTA-P difficult to interpret; CTA read with R A2 segment spasm/narrowing although of unknown significance. Clinically appeared stable. Decision was made for no angiogram.  - HOB > 30 degrees;   - CTP- R temporal reduced CBV/CBF     #IVH with subsequent hydrocephalus   #Brain compression with midline shift R>L  EVD placed 5/15  - EVD remains at 10, ICP 5-7, drained 207 yesterday, 41cc since midnight;      #Agitation  - Seroquel 50 mg  - Precedex gtt stop  - Will use Propofol @ 30 with, fentanyl 50mcg/hr.     #Analgesics  - Tylenol 650 mg q4h PRN   - Fentanyl bumps 25-50 mcg q2h PRN     CV:  #HTN  ECHO completed on 5/17; EF 60-65%  - SBP wide range in setting suctioning and agitation with hypoxia  - SBP < 200  - Holding PTA Lisinopril  - Hydralazine and Labetalol PRN  -  5/24     Pulm:  #Acute hypoxic respiratory failure  #mixed acid base disorder   Re-intubated 5/19  - Overnight with desaturations that improved with sedation;   - CXR AM overall improved but with continued b/l interstitial involvement opacities. Will diurese today.  - Duonebs q6   - Mucomyst stopped due to decreased secretions  - Following ABGs  - ETT repositioning again.     #concern for PE w/persistent hypoxia and Large AA gradient (>300)  - CT-PE negative  - no utility in d-dimer in setting of active infection    Ventilation Mode: CMV/AC  (Continuous Mandatory Ventilation/ Assist Control)  FiO2 (%): 60 %  Rate Set (breaths/minute): 14 breaths/min  Tidal Volume Set (mL): 450 mL  PEEP (cm H2O): (S) 10 cmH2O  Oxygen  Concentration (%): 60 %  Peak Inspiratory Pressure (cm H2O) (Drager Marifer): 16  Resp: 26       GI/Nutrition:  #Severe malnutrition in the context of acute on chronic illness  - Protein modular 1 Pkt  - Multivitamins w/minerals  - Diet: TF's @ goal (60)  - NG   - continue rectal tube given high output  - Hold senna given increase stool output    Renal:    I/o: +1.2L (3.1L UOP out; 5L in) yesterday;  +7.6L since admission;     #Hyperchloremia; 111  -  ml q2h    #Urinary retention  - Doxazosin 1 mg daily      - IV Fluids: None  - BMP daily  - Electrolyte replacement protocol in place     Endo:  #Hyperglycemia   - Hgb A1c 5.5  - Follow glucose levels     Heme:  #Leukocytosis  #Normocyctic anemia  - CBC daily     ID:  #afebrile overnight; tmax 99.8  #WBC   #Aspiration pneumonia  #NGTD; gm stain with GNRs; 5/22 sputum with moderate growth pseudomonas, klebsiella  -5/25: repeat panculture with heavy growth pseudomonas, previously pansensitive.   - 5/19 Sputum culture with heavy growth Klebsiella oxytoca; Heavy growth Staphylococcus epidermidis; Heavy growth beta hemolytic Streptococcus constellatus   - Sputum 5/22--with mod growth of Pseudomonas aeruginosa   - Ceftriaxone (5/21-5/23) was switched to Zosyn (5/23-present) for 7 day course  - BC 5/19, 5/22 with NGTD  - panculture; so far GM stain with expected GNRs  - Follow fever curve  - UA negative     PPX:  DVT Prophylaxis  - SCDs in place  - Heparin subcutaneous    GI Prophylaxis  - Famotidine 20 mg BID      Lines/Tubes/Drains:  - PIV x 1  - L subclavian CVC/TL 5/15  - NG  - EVD  - Rectal tube  - Continue to condom catheter     Code Status: Full code     Dispo: ICU    The patient was seen and discussed with the attending, Dr. Casillas.    David Sands MD  Neuro ICU Resident PGY-1

## 2021-05-27 NOTE — PLAN OF CARE
Problem: Restraint for Non-Violent/Non-Self-Destructive Behavior  Goal: Prevent/Manage Potential Problems  Description: Maintain safety of patient and others during period of restraint.  Promote psychological and physical wellbeing.  Prevent injury to skin and involved body parts.  Promote nutrition, hydration, and elimination.  Outcome: Completed     Pt sedated on propofol, precedex, and fentanyl. No longer exhibiting behavior warranting restraint use. Restraints discontinued. Will continue close monitoring of pt.

## 2021-05-27 NOTE — PLAN OF CARE
Major Shift Events:  Sedated on propofol, precedex, and fentanyl. Tolerating CMV/ AC settings at FiO2 50%, rate 14, , PEEP 14. SaO2 >92%. Pupils equal and reactive. EVD 10 above, ICP 5-10, output 3-15. SR, BP <200.     Plan: SaO2 >92%. SBP<200. Continue to monitor.     For vital signs and complete assessments, please see documentation flowsheets.

## 2021-05-28 ENCOUNTER — APPOINTMENT (OUTPATIENT)
Dept: GENERAL RADIOLOGY | Facility: CLINIC | Age: 71
DRG: 003 | End: 2021-05-28
Attending: STUDENT IN AN ORGANIZED HEALTH CARE EDUCATION/TRAINING PROGRAM
Payer: COMMERCIAL

## 2021-05-28 ENCOUNTER — APPOINTMENT (OUTPATIENT)
Dept: ULTRASOUND IMAGING | Facility: CLINIC | Age: 71
DRG: 003 | End: 2021-05-28
Attending: STUDENT IN AN ORGANIZED HEALTH CARE EDUCATION/TRAINING PROGRAM
Payer: COMMERCIAL

## 2021-05-28 ENCOUNTER — APPOINTMENT (OUTPATIENT)
Dept: NEUROLOGY | Facility: CLINIC | Age: 71
DRG: 003 | End: 2021-05-28
Attending: STUDENT IN AN ORGANIZED HEALTH CARE EDUCATION/TRAINING PROGRAM
Payer: COMMERCIAL

## 2021-05-28 ENCOUNTER — APPOINTMENT (OUTPATIENT)
Dept: INTERVENTIONAL RADIOLOGY/VASCULAR | Facility: CLINIC | Age: 71
DRG: 003 | End: 2021-05-28
Attending: NEUROLOGICAL SURGERY
Payer: COMMERCIAL

## 2021-05-28 ENCOUNTER — APPOINTMENT (OUTPATIENT)
Dept: MRI IMAGING | Facility: CLINIC | Age: 71
DRG: 003 | End: 2021-05-28
Attending: STUDENT IN AN ORGANIZED HEALTH CARE EDUCATION/TRAINING PROGRAM
Payer: COMMERCIAL

## 2021-05-28 ENCOUNTER — APPOINTMENT (OUTPATIENT)
Dept: CT IMAGING | Facility: CLINIC | Age: 71
DRG: 003 | End: 2021-05-28
Attending: STUDENT IN AN ORGANIZED HEALTH CARE EDUCATION/TRAINING PROGRAM
Payer: COMMERCIAL

## 2021-05-28 LAB
ALBUMIN UR-MCNC: 20 MG/DL
ANION GAP SERPL CALCULATED.3IONS-SCNC: 4 MMOL/L (ref 3–14)
APPEARANCE CSF: ABNORMAL
APPEARANCE UR: CLEAR
BACTERIA SPEC CULT: NO GROWTH
BACTERIA SPEC CULT: NO GROWTH
BASE EXCESS BLDA CALC-SCNC: 7.4 MMOL/L
BASOPHILS # BLD AUTO: 0.1 10E9/L (ref 0–0.2)
BASOPHILS NFR BLD AUTO: 0.2 %
BILIRUB UR QL STRIP: NEGATIVE
BUN SERPL-MCNC: 20 MG/DL (ref 7–30)
CALCIUM SERPL-MCNC: 9 MG/DL (ref 8.5–10.1)
CHLORIDE SERPL-SCNC: 104 MMOL/L (ref 94–109)
CO2 SERPL-SCNC: 30 MMOL/L (ref 20–32)
COLOR CSF: ABNORMAL
COLOR UR AUTO: ABNORMAL
CREAT SERPL-MCNC: 0.56 MG/DL (ref 0.66–1.25)
CRP SERPL-MCNC: 150 MG/L (ref 0–8)
DIFFERENTIAL METHOD BLD: ABNORMAL
EOSINOPHIL # BLD AUTO: 0.1 10E9/L (ref 0–0.7)
EOSINOPHIL NFR BLD AUTO: 0.3 %
ERYTHROCYTE [DISTWIDTH] IN BLOOD BY AUTOMATED COUNT: 13 % (ref 10–15)
ERYTHROCYTE [DISTWIDTH] IN BLOOD BY AUTOMATED COUNT: 13.1 % (ref 10–15)
GFR SERPL CREATININE-BSD FRML MDRD: >90 ML/MIN/{1.73_M2}
GLUCOSE BLDC GLUCOMTR-MCNC: 144 MG/DL (ref 70–99)
GLUCOSE BLDC GLUCOMTR-MCNC: 150 MG/DL (ref 70–99)
GLUCOSE BLDC GLUCOMTR-MCNC: 163 MG/DL (ref 70–99)
GLUCOSE BLDC GLUCOMTR-MCNC: 164 MG/DL (ref 70–99)
GLUCOSE BLDC GLUCOMTR-MCNC: 173 MG/DL (ref 70–99)
GLUCOSE SERPL-MCNC: 168 MG/DL (ref 70–99)
GLUCOSE UR STRIP-MCNC: NEGATIVE MG/DL
GRAM STN SPEC: ABNORMAL
GRAM STN SPEC: ABNORMAL
GRAM STN SPEC: NORMAL
GRAM STN SPEC: NORMAL
HCO3 BLD-SCNC: 33 MMOL/L (ref 21–28)
HCT VFR BLD AUTO: 27.6 % (ref 40–53)
HCT VFR BLD AUTO: 27.7 % (ref 40–53)
HGB BLD-MCNC: 8.9 G/DL (ref 13.3–17.7)
HGB BLD-MCNC: 8.9 G/DL (ref 13.3–17.7)
HGB UR QL STRIP: NEGATIVE
IMM GRANULOCYTES # BLD: 0.7 10E9/L (ref 0–0.4)
IMM GRANULOCYTES NFR BLD: 2.3 %
KETONES UR STRIP-MCNC: NEGATIVE MG/DL
LEUKOCYTE ESTERASE UR QL STRIP: NEGATIVE
LYMPH ABN NFR CSF MANUAL: 6 %
LYMPHOCYTES # BLD AUTO: 1.1 10E9/L (ref 0.8–5.3)
LYMPHOCYTES NFR BLD AUTO: 3.5 %
Lab: ABNORMAL
MAGNESIUM SERPL-MCNC: 2.4 MG/DL (ref 1.6–2.3)
MCH RBC QN AUTO: 31 PG (ref 26.5–33)
MCH RBC QN AUTO: 31.2 PG (ref 26.5–33)
MCHC RBC AUTO-ENTMCNC: 32.1 G/DL (ref 31.5–36.5)
MCHC RBC AUTO-ENTMCNC: 32.2 G/DL (ref 31.5–36.5)
MCV RBC AUTO: 96 FL (ref 78–100)
MCV RBC AUTO: 97 FL (ref 78–100)
MONOCYTES # BLD AUTO: 0.7 10E9/L (ref 0–1.3)
MONOCYTES NFR BLD AUTO: 2.4 %
MONOS+MACROS NFR CSF MANUAL: 35 %
MUCOUS THREADS #/AREA URNS LPF: PRESENT /LPF
NEUTROPHILS # BLD AUTO: 27.7 10E9/L (ref 1.6–8.3)
NEUTROPHILS NFR BLD AUTO: 91.3 %
NEUTROPHILS NFR CSF MANUAL: 59 %
NITRATE UR QL: NEGATIVE
NRBC # BLD AUTO: 0 10*3/UL
NRBC BLD AUTO-RTO: 0 /100
O2/TOTAL GAS SETTING VFR VENT: 60 %
PCO2 BLD: 49 MM HG (ref 35–45)
PH BLD: 7.43 PH (ref 7.35–7.45)
PH UR STRIP: 5 PH (ref 5–7)
PHOSPHATE SERPL-MCNC: 2.9 MG/DL (ref 2.5–4.5)
PLATELET # BLD AUTO: 630 10E9/L (ref 150–450)
PLATELET # BLD AUTO: 730 10E9/L (ref 150–450)
PO2 BLD: 84 MM HG (ref 80–105)
POTASSIUM SERPL-SCNC: 3.9 MMOL/L (ref 3.4–5.3)
PROCALCITONIN SERPL-MCNC: 0.12 NG/ML
RADIOLOGIST FLAGS: ABNORMAL
RBC # BLD AUTO: 2.85 10E12/L (ref 4.4–5.9)
RBC # BLD AUTO: 2.87 10E12/L (ref 4.4–5.9)
RBC # CSF MANUAL: ABNORMAL /UL (ref 0–2)
RBC #/AREA URNS AUTO: <1 /HPF (ref 0–2)
SODIUM SERPL-SCNC: 138 MMOL/L (ref 133–144)
SOURCE: ABNORMAL
SP GR UR STRIP: 1.02 (ref 1–1.03)
SPECIMEN SOURCE: ABNORMAL
SPECIMEN SOURCE: NORMAL
SQUAMOUS #/AREA URNS AUTO: 0 /HPF (ref 0–1)
TRIGL SERPL-MCNC: 91 MG/DL
TUBE # CSF: ABNORMAL #
UROBILINOGEN UR STRIP-MCNC: NORMAL MG/DL (ref 0–2)
WBC # BLD AUTO: 30.3 10E9/L (ref 4–11)
WBC # BLD AUTO: 30.9 10E9/L (ref 4–11)
WBC # CSF MANUAL: 70 /UL (ref 0–5)
WBC #/AREA URNS AUTO: 1 /HPF (ref 0–5)

## 2021-05-28 PROCEDURE — 250N000013 HC RX MED GY IP 250 OP 250 PS 637: Performed by: STUDENT IN AN ORGANIZED HEALTH CARE EDUCATION/TRAINING PROGRAM

## 2021-05-28 PROCEDURE — B3181ZZ FLUOROSCOPY OF BILATERAL INTERNAL CAROTID ARTERIES USING LOW OSMOLAR CONTRAST: ICD-10-PCS | Performed by: NEUROLOGICAL SURGERY

## 2021-05-28 PROCEDURE — 82803 BLOOD GASES ANY COMBINATION: CPT | Performed by: STUDENT IN AN ORGANIZED HEALTH CARE EDUCATION/TRAINING PROGRAM

## 2021-05-28 PROCEDURE — 200N000002 HC R&B ICU UMMC

## 2021-05-28 PROCEDURE — 93970 EXTREMITY STUDY: CPT | Mod: XS

## 2021-05-28 PROCEDURE — 70450 CT HEAD/BRAIN W/O DYE: CPT

## 2021-05-28 PROCEDURE — 95720 EEG PHY/QHP EA INCR W/VEEG: CPT | Performed by: PSYCHIATRY & NEUROLOGY

## 2021-05-28 PROCEDURE — 255N000002 HC RX 255 OP 636: Performed by: NEUROLOGICAL SURGERY

## 2021-05-28 PROCEDURE — 258N000003 HC RX IP 258 OP 636: Performed by: STUDENT IN AN ORGANIZED HEALTH CARE EDUCATION/TRAINING PROGRAM

## 2021-05-28 PROCEDURE — 86140 C-REACTIVE PROTEIN: CPT | Performed by: STUDENT IN AN ORGANIZED HEALTH CARE EDUCATION/TRAINING PROGRAM

## 2021-05-28 PROCEDURE — 84100 ASSAY OF PHOSPHORUS: CPT | Performed by: STUDENT IN AN ORGANIZED HEALTH CARE EDUCATION/TRAINING PROGRAM

## 2021-05-28 PROCEDURE — 250N000011 HC RX IP 250 OP 636: Performed by: NEUROLOGICAL SURGERY

## 2021-05-28 PROCEDURE — 85027 COMPLETE CBC AUTOMATED: CPT | Performed by: STUDENT IN AN ORGANIZED HEALTH CARE EDUCATION/TRAINING PROGRAM

## 2021-05-28 PROCEDURE — 87075 CULTR BACTERIA EXCEPT BLOOD: CPT | Performed by: NURSE PRACTITIONER

## 2021-05-28 PROCEDURE — A9585 GADOBUTROL INJECTION: HCPCS | Performed by: NEUROLOGICAL SURGERY

## 2021-05-28 PROCEDURE — 84478 ASSAY OF TRIGLYCERIDES: CPT | Performed by: STUDENT IN AN ORGANIZED HEALTH CARE EDUCATION/TRAINING PROGRAM

## 2021-05-28 PROCEDURE — 71045 X-RAY EXAM CHEST 1 VIEW: CPT | Mod: 26 | Performed by: RADIOLOGY

## 2021-05-28 PROCEDURE — 258N000003 HC RX IP 258 OP 636: Performed by: NEUROLOGICAL SURGERY

## 2021-05-28 PROCEDURE — 80048 BASIC METABOLIC PNL TOTAL CA: CPT | Performed by: STUDENT IN AN ORGANIZED HEALTH CARE EDUCATION/TRAINING PROGRAM

## 2021-05-28 PROCEDURE — 87070 CULTURE OTHR SPECIMN AEROBIC: CPT | Performed by: NURSE PRACTITIONER

## 2021-05-28 PROCEDURE — 250N000011 HC RX IP 250 OP 636: Performed by: STUDENT IN AN ORGANIZED HEALTH CARE EDUCATION/TRAINING PROGRAM

## 2021-05-28 PROCEDURE — 87070 CULTURE OTHR SPECIMN AEROBIC: CPT | Performed by: STUDENT IN AN ORGANIZED HEALTH CARE EDUCATION/TRAINING PROGRAM

## 2021-05-28 PROCEDURE — 95714 VEEG EA 12-26 HR UNMNTR: CPT

## 2021-05-28 PROCEDURE — 36226 PLACE CATH VERTEBRAL ART: CPT | Mod: 78 | Performed by: NEUROLOGICAL SURGERY

## 2021-05-28 PROCEDURE — 71045 X-RAY EXAM CHEST 1 VIEW: CPT

## 2021-05-28 PROCEDURE — 84145 PROCALCITONIN (PCT): CPT | Performed by: STUDENT IN AN ORGANIZED HEALTH CARE EDUCATION/TRAINING PROGRAM

## 2021-05-28 PROCEDURE — 94640 AIRWAY INHALATION TREATMENT: CPT

## 2021-05-28 PROCEDURE — 93886 INTRACRANIAL COMPLETE STUDY: CPT | Mod: 26 | Performed by: RADIOLOGY

## 2021-05-28 PROCEDURE — 250N000013 HC RX MED GY IP 250 OP 250 PS 637: Performed by: NURSE PRACTITIONER

## 2021-05-28 PROCEDURE — 94003 VENT MGMT INPAT SUBQ DAY: CPT

## 2021-05-28 PROCEDURE — 36415 COLL VENOUS BLD VENIPUNCTURE: CPT | Performed by: STUDENT IN AN ORGANIZED HEALTH CARE EDUCATION/TRAINING PROGRAM

## 2021-05-28 PROCEDURE — 250N000009 HC RX 250: Performed by: STUDENT IN AN ORGANIZED HEALTH CARE EDUCATION/TRAINING PROGRAM

## 2021-05-28 PROCEDURE — 36223 PLACE CATH CAROTID/INOM ART: CPT | Mod: RT

## 2021-05-28 PROCEDURE — 999N001017 HC STATISTIC GLUCOSE BY METER IP

## 2021-05-28 PROCEDURE — 250N000013 HC RX MED GY IP 250 OP 250 PS 637: Performed by: NEUROLOGICAL SURGERY

## 2021-05-28 PROCEDURE — B31F1ZZ FLUOROSCOPY OF LEFT VERTEBRAL ARTERY USING LOW OSMOLAR CONTRAST: ICD-10-PCS | Performed by: NEUROLOGICAL SURGERY

## 2021-05-28 PROCEDURE — 3E053GC INTRODUCTION OF OTHER THERAPEUTIC SUBSTANCE INTO PERIPHERAL ARTERY, PERCUTANEOUS APPROACH: ICD-10-PCS | Performed by: NEUROLOGICAL SURGERY

## 2021-05-28 PROCEDURE — 87205 SMEAR GRAM STAIN: CPT | Performed by: NURSE PRACTITIONER

## 2021-05-28 PROCEDURE — 70553 MRI BRAIN STEM W/O & W/DYE: CPT

## 2021-05-28 PROCEDURE — 81001 URINALYSIS AUTO W/SCOPE: CPT | Performed by: STUDENT IN AN ORGANIZED HEALTH CARE EDUCATION/TRAINING PROGRAM

## 2021-05-28 PROCEDURE — 83735 ASSAY OF MAGNESIUM: CPT | Performed by: STUDENT IN AN ORGANIZED HEALTH CARE EDUCATION/TRAINING PROGRAM

## 2021-05-28 PROCEDURE — 93970 EXTREMITY STUDY: CPT | Mod: 26 | Performed by: RADIOLOGY

## 2021-05-28 PROCEDURE — 94640 AIRWAY INHALATION TREATMENT: CPT | Mod: 76

## 2021-05-28 PROCEDURE — 85025 COMPLETE CBC W/AUTO DIFF WBC: CPT | Performed by: STUDENT IN AN ORGANIZED HEALTH CARE EDUCATION/TRAINING PROGRAM

## 2021-05-28 PROCEDURE — 93886 INTRACRANIAL COMPLETE STUDY: CPT

## 2021-05-28 PROCEDURE — 999N000185 HC STATISTIC TRANSPORT TIME EA 15 MIN

## 2021-05-28 PROCEDURE — 70450 CT HEAD/BRAIN W/O DYE: CPT | Mod: 26 | Performed by: RADIOLOGY

## 2021-05-28 PROCEDURE — 3E043XZ INTRODUCTION OF VASOPRESSOR INTO CENTRAL VEIN, PERCUTANEOUS APPROACH: ICD-10-PCS | Performed by: PSYCHIATRY & NEUROLOGY

## 2021-05-28 PROCEDURE — 87040 BLOOD CULTURE FOR BACTERIA: CPT | Performed by: STUDENT IN AN ORGANIZED HEALTH CARE EDUCATION/TRAINING PROGRAM

## 2021-05-28 PROCEDURE — 82945 GLUCOSE OTHER FLUID: CPT | Performed by: STUDENT IN AN ORGANIZED HEALTH CARE EDUCATION/TRAINING PROGRAM

## 2021-05-28 PROCEDURE — 99291 CRITICAL CARE FIRST HOUR: CPT | Mod: 25 | Performed by: PSYCHIATRY & NEUROLOGY

## 2021-05-28 PROCEDURE — 84157 ASSAY OF PROTEIN OTHER: CPT | Performed by: STUDENT IN AN ORGANIZED HEALTH CARE EDUCATION/TRAINING PROGRAM

## 2021-05-28 PROCEDURE — 93970 EXTREMITY STUDY: CPT

## 2021-05-28 PROCEDURE — 87186 SC STD MICRODIL/AGAR DIL: CPT | Performed by: STUDENT IN AN ORGANIZED HEALTH CARE EDUCATION/TRAINING PROGRAM

## 2021-05-28 PROCEDURE — 70553 MRI BRAIN STEM W/O & W/DYE: CPT | Mod: 26 | Performed by: RADIOLOGY

## 2021-05-28 PROCEDURE — 87077 CULTURE AEROBIC IDENTIFY: CPT | Performed by: STUDENT IN AN ORGANIZED HEALTH CARE EDUCATION/TRAINING PROGRAM

## 2021-05-28 PROCEDURE — 250N000009 HC RX 250: Performed by: PSYCHIATRY & NEUROLOGY

## 2021-05-28 PROCEDURE — 36224 PLACE CATH CAROTD ART: CPT | Mod: LT

## 2021-05-28 PROCEDURE — 250N000011 HC RX IP 250 OP 636: Performed by: PSYCHIATRY & NEUROLOGY

## 2021-05-28 PROCEDURE — 87205 SMEAR GRAM STAIN: CPT | Performed by: STUDENT IN AN ORGANIZED HEALTH CARE EDUCATION/TRAINING PROGRAM

## 2021-05-28 PROCEDURE — 36224 PLACE CATH CAROTD ART: CPT | Mod: 78 | Performed by: NEUROLOGICAL SURGERY

## 2021-05-28 PROCEDURE — 999N000157 HC STATISTIC RCP TIME EA 10 MIN

## 2021-05-28 PROCEDURE — 89051 BODY FLUID CELL COUNT: CPT | Performed by: STUDENT IN AN ORGANIZED HEALTH CARE EDUCATION/TRAINING PROGRAM

## 2021-05-28 RX ORDER — SODIUM CHLORIDE 9 MG/ML
INJECTION, SOLUTION INTRAVENOUS CONTINUOUS
Status: DISCONTINUED | OUTPATIENT
Start: 2021-05-28 | End: 2021-05-29

## 2021-05-28 RX ORDER — HEPARIN SODIUM 200 [USP'U]/100ML
1 INJECTION, SOLUTION INTRAVENOUS CONTINUOUS PRN
Status: DISCONTINUED | OUTPATIENT
Start: 2021-05-28 | End: 2021-05-28 | Stop reason: HOSPADM

## 2021-05-28 RX ORDER — METOCLOPRAMIDE HYDROCHLORIDE 5 MG/ML
5 INJECTION INTRAMUSCULAR; INTRAVENOUS EVERY 6 HOURS PRN
Status: DISCONTINUED | OUTPATIENT
Start: 2021-05-28 | End: 2021-06-01

## 2021-05-28 RX ORDER — ONDANSETRON 4 MG/1
4 TABLET, ORALLY DISINTEGRATING ORAL EVERY 6 HOURS PRN
Status: DISCONTINUED | OUTPATIENT
Start: 2021-05-28 | End: 2021-06-01

## 2021-05-28 RX ORDER — VERAPAMIL HYDROCHLORIDE 2.5 MG/ML
20 INJECTION, SOLUTION INTRAVENOUS
Status: COMPLETED | OUTPATIENT
Start: 2021-05-28 | End: 2021-05-28

## 2021-05-28 RX ORDER — METOCLOPRAMIDE 5 MG/1
5 TABLET ORAL EVERY 6 HOURS PRN
Status: DISCONTINUED | OUTPATIENT
Start: 2021-05-28 | End: 2021-06-01

## 2021-05-28 RX ORDER — PROCHLORPERAZINE MALEATE 5 MG
5 TABLET ORAL EVERY 6 HOURS PRN
Status: DISCONTINUED | OUTPATIENT
Start: 2021-05-28 | End: 2021-06-01

## 2021-05-28 RX ORDER — PROCHLORPERAZINE 25 MG
12.5 SUPPOSITORY, RECTAL RECTAL EVERY 12 HOURS PRN
Status: DISCONTINUED | OUTPATIENT
Start: 2021-05-28 | End: 2021-06-01

## 2021-05-28 RX ORDER — GADOBUTROL 604.72 MG/ML
7.5 INJECTION INTRAVENOUS ONCE
Status: COMPLETED | OUTPATIENT
Start: 2021-05-28 | End: 2021-05-28

## 2021-05-28 RX ORDER — IODIXANOL 320 MG/ML
150 INJECTION, SOLUTION INTRAVASCULAR ONCE
Status: COMPLETED | OUTPATIENT
Start: 2021-05-28 | End: 2021-05-28

## 2021-05-28 RX ORDER — ONDANSETRON 2 MG/ML
4 INJECTION INTRAMUSCULAR; INTRAVENOUS EVERY 6 HOURS PRN
Status: DISCONTINUED | OUTPATIENT
Start: 2021-05-28 | End: 2021-06-01

## 2021-05-28 RX ADMIN — HEPARIN SODIUM 5000 UNITS: 5000 INJECTION, SOLUTION INTRAVENOUS; SUBCUTANEOUS at 23:54

## 2021-05-28 RX ADMIN — INSULIN ASPART 1 UNITS: 100 INJECTION, SOLUTION INTRAVENOUS; SUBCUTANEOUS at 18:14

## 2021-05-28 RX ADMIN — SODIUM CHLORIDE: 9 INJECTION, SOLUTION INTRAVENOUS at 14:43

## 2021-05-28 RX ADMIN — Medication 1 PACKET: at 14:13

## 2021-05-28 RX ADMIN — Medication 10 ML: at 16:13

## 2021-05-28 RX ADMIN — VERAPAMIL HYDROCHLORIDE 20 MG: 2.5 INJECTION INTRAVENOUS at 10:32

## 2021-05-28 RX ADMIN — VANCOMYCIN HYDROCHLORIDE 1750 MG: 10 INJECTION, POWDER, LYOPHILIZED, FOR SOLUTION INTRAVENOUS at 18:14

## 2021-05-28 RX ADMIN — INSULIN ASPART 2 UNITS: 100 INJECTION, SOLUTION INTRAVENOUS; SUBCUTANEOUS at 04:16

## 2021-05-28 RX ADMIN — INSULIN ASPART 1 UNITS: 100 INJECTION, SOLUTION INTRAVENOUS; SUBCUTANEOUS at 23:56

## 2021-05-28 RX ADMIN — Medication 10 ML: at 21:42

## 2021-05-28 RX ADMIN — IPRATROPIUM BROMIDE AND ALBUTEROL SULFATE 3 ML: 2.5; .5 SOLUTION RESPIRATORY (INHALATION) at 15:34

## 2021-05-28 RX ADMIN — HEPARIN SODIUM 5000 UNITS: 5000 INJECTION, SOLUTION INTRAVENOUS; SUBCUTANEOUS at 18:17

## 2021-05-28 RX ADMIN — NIMODIPINE 60 MG: 30 SOLUTION ORAL at 21:41

## 2021-05-28 RX ADMIN — PIPERACILLIN SODIUM AND TAZOBACTAM SODIUM 4.5 G: 4; .5 INJECTION, POWDER, LYOPHILIZED, FOR SOLUTION INTRAVENOUS at 11:53

## 2021-05-28 RX ADMIN — IPRATROPIUM BROMIDE AND ALBUTEROL SULFATE 3 ML: 2.5; .5 SOLUTION RESPIRATORY (INHALATION) at 19:39

## 2021-05-28 RX ADMIN — NIMODIPINE 60 MG: 30 SOLUTION ORAL at 23:55

## 2021-05-28 RX ADMIN — HEPARIN SODIUM 5 BAG: 200 INJECTION, SOLUTION INTRAVENOUS at 10:24

## 2021-05-28 RX ADMIN — PROPOFOL 40 MCG/KG/MIN: 10 INJECTION, EMULSION INTRAVENOUS at 03:49

## 2021-05-28 RX ADMIN — FAMOTIDINE 20 MG: 20 TABLET ORAL at 19:49

## 2021-05-28 RX ADMIN — VERAPAMIL HYDROCHLORIDE 20 MG: 2.5 INJECTION INTRAVENOUS at 10:46

## 2021-05-28 RX ADMIN — POTASSIUM CHLORIDE 20 MEQ: 1.5 POWDER, FOR SOLUTION ORAL at 19:49

## 2021-05-28 RX ADMIN — CEFEPIME 2 G: 2 INJECTION, POWDER, FOR SOLUTION INTRAVENOUS at 18:17

## 2021-05-28 RX ADMIN — QUETIAPINE 50 MG: 50 TABLET ORAL at 07:32

## 2021-05-28 RX ADMIN — Medication 10 ML: at 07:33

## 2021-05-28 RX ADMIN — Medication 1 PACKET: at 19:49

## 2021-05-28 RX ADMIN — PIPERACILLIN SODIUM AND TAZOBACTAM SODIUM 4.5 G: 4; .5 INJECTION, POWDER, LYOPHILIZED, FOR SOLUTION INTRAVENOUS at 03:49

## 2021-05-28 RX ADMIN — POTASSIUM CHLORIDE 20 MEQ: 1.5 POWDER, FOR SOLUTION ORAL at 11:53

## 2021-05-28 RX ADMIN — NIMODIPINE 60 MG: 30 SOLUTION ORAL at 07:33

## 2021-05-28 RX ADMIN — NIMODIPINE 60 MG: 30 SOLUTION ORAL at 11:50

## 2021-05-28 RX ADMIN — IPRATROPIUM BROMIDE AND ALBUTEROL SULFATE 3 ML: 2.5; .5 SOLUTION RESPIRATORY (INHALATION) at 07:45

## 2021-05-28 RX ADMIN — Medication 10 ML: at 03:53

## 2021-05-28 RX ADMIN — QUETIAPINE 50 MG: 50 TABLET ORAL at 19:49

## 2021-05-28 RX ADMIN — DOXAZOSIN 1 MG: 1 TABLET ORAL at 07:32

## 2021-05-28 RX ADMIN — Medication 10 ML: at 11:50

## 2021-05-28 RX ADMIN — Medication 50 MCG/HR: at 05:27

## 2021-05-28 RX ADMIN — Medication 50 MCG/HR: at 15:55

## 2021-05-28 RX ADMIN — NIMODIPINE 60 MG: 30 SOLUTION ORAL at 03:53

## 2021-05-28 RX ADMIN — LIDOCAINE HYDROCHLORIDE 5 ML: 10 INJECTION, SOLUTION EPIDURAL; INFILTRATION; INTRACAUDAL; PERINEURAL at 10:19

## 2021-05-28 RX ADMIN — POLYETHYLENE GLYCOL 3350 17 G: 17 POWDER, FOR SOLUTION ORAL at 07:32

## 2021-05-28 RX ADMIN — Medication 1 PACKET: at 07:33

## 2021-05-28 RX ADMIN — HEPARIN SODIUM 5000 UNITS: 5000 INJECTION, SOLUTION INTRAVENOUS; SUBCUTANEOUS at 07:32

## 2021-05-28 RX ADMIN — INSULIN ASPART 1 UNITS: 100 INJECTION, SOLUTION INTRAVENOUS; SUBCUTANEOUS at 07:32

## 2021-05-28 RX ADMIN — FAMOTIDINE 20 MG: 20 TABLET ORAL at 07:32

## 2021-05-28 RX ADMIN — IODIXANOL 60 ML: 320 INJECTION, SOLUTION INTRAVASCULAR at 10:54

## 2021-05-28 RX ADMIN — GADOBUTROL 7.5 ML: 604.72 INJECTION INTRAVENOUS at 17:36

## 2021-05-28 RX ADMIN — NIMODIPINE 60 MG: 30 SOLUTION ORAL at 16:12

## 2021-05-28 RX ADMIN — IPRATROPIUM BROMIDE AND ALBUTEROL SULFATE 3 ML: 2.5; .5 SOLUTION RESPIRATORY (INHALATION) at 02:09

## 2021-05-28 ASSESSMENT — ACTIVITIES OF DAILY LIVING (ADL)
ADLS_ACUITY_SCORE: 22

## 2021-05-28 ASSESSMENT — VISUAL ACUITY
OU: OTHER (SEE COMMENT)
OU: OTHER (SEE COMMENT);GLASSES

## 2021-05-28 NOTE — PROCEDURES
[facility]     Endovascular Surgical Neuroradiology Post-Procedure Note    Pre-Procedure Diagnosis:  Cerebral vasopasm.  Post-Procedure Diagnosis:  Mild cerebral vasospasm.    Procedure(s):   Intra-arterial vasospasm treatment with medication or angioplasty    Findings:  Mild vasospasm Left A1 and right A1. 20 mg verapamil injected in both ICA's    Plan:  Continue follow up in ICU    Primary Surgeon:  Dr. Jamin Martinez  Secondary Surgeon:  Not applicable  Secondary Surgeon Review:  None  Fellow:  Riky  Additional Assistants:  -    Prior to the start of the procedure and with procedural staff participation, I verbally confirmed: the patient s identity using two indicators, relevant allergies, that the procedure was appropriate and matched the consent or emergent situation, and that the correct equipment/implants were available. Immediately prior to starting the procedure I conducted the Time Out with the procedural staff and re-confirmed the patient s name, procedure, and site/side. (The Joint Commission universal protocol was followed.)  Yes    PRU value: Not applicable    Anesthesia:  None  Medications:  Verapamil  Puncture site:  Right Femoral Artery    Fluoroscopy time (minutes):  19.7  Radiation dose (mGy):  346    Contrast amount (mL):  45     Estimated blood loss (mL):  Minimal    Closure:  Device 6F Angioseal.    Disposition:  Will be followed in hospital by the Neurosurgery team.        Sedation Post-Procedure Summary    Sedatives: No sedation     Vital signs and pulse oximetry were monitored and remained stable throughout the procedure, and sedation was maintained until the procedure was complete.  The patient was monitored by staff until sedation discharge criteria were met.    Patient tolerance:  Patient tolerated the procedure well with no immediate complications.    Time of sedation in minutes: 0 minutes from beginning to end of physician one to one  monitoring.    Gutierrez Lan MD  Pager:  [phone].

## 2021-05-28 NOTE — PLAN OF CARE
Shift Summary: No acute changes. RASS -4 throughout shift, remains synchronous with vent. Weaning sedation as able. Blood, urine, and sputum cultures sent per order.     Assessment:  Neuro: q2hr neuro checks. Eyes do not open to stimulus. PERRL, 3mm. Withdrawing to pain in BLE, minimal response in BUE-weaning sedation as able. EVD remains at 10 above EAM, ICP 2-12, output 0-8.   Resp: CVM 14, 450, +7, 60%. Lung sounds diminished  Cardiac: NSR. BP WDL.  GI: TF at goal, rectal tube removed due to minimal output.  : Permofit in place, adequate output.  Skin: No new skin concerns  Line: L) CVC, PIV x2, R) radial art  Gtt: Prop, Fent, TKO    Plan:  Wean sedation as able to remain synchronous with vent. Monitor neuro status and EVD as ordered and PRN.    Penelope Bullard RN

## 2021-05-28 NOTE — SUMMARY OF CARE
Neuro - Sedation was off at 0730 at the beginning of the shift and Dayron was found to be unresponsive to noxious stimuli. He withdraws to pain in both his lower extremities and in his left upper extremity. There was no response to stimuli in his right upper. Pupils remain round and brisk.EEG started. EVD at 10 above, ICP's 5-12, and dark red output 0 - 15 ml's / hr.     CV- Sinus rhythm with no noted ectopy. Systolic blood pressure less than goal of 200 without intervention.     Pulm- Lung sounds clear bilaterally. Dysynchronous breathing.   Tachypneic 20 to 40's. Improved with restarting fentanyl.     GI - Tube feeds are at goal of 60. 50 q2 flushes. No BM this shift.     - Prima fit with adequate urine output, straight cath x1

## 2021-05-28 NOTE — IR NOTE
Patient Name: Dayron Neri  Medical Record Number: [id number]  Today's Date: 5/28/2021    Procedure: Bilateral Cerebral Angiogram with Intervention  Proceduralist: Riky Martinez  Total sedation time: No sedation, lidocaine used at site    verapamil (ISOPTIN) injection 20 mg given at 1032 Left ICA  verapamil (ISOPTIN) injection 20 mg given at 1045 Right ICA    Procedure start time: 1007  Procedure end time: 1051    Report given to: Sharonda LARA  : n/a    Other Notes: Pt arrived to IR room 1 from inpatient unit. Consent reviewed, signed by wife.. Pt positioned supine and monitored per protocol. Site cleansed and dressed per protocol. Pt tolerated procedure without any noted complications. Pt transferred back to inpatient unit    Right groin accessed with 6 fr sheath.  Right Sheath removal at 1047.  Hemostasis achieved at 1050.  Bedrest for 2 hours, until 1251.   Bilateral Post procedure pulses +2  .

## 2021-05-28 NOTE — PROGRESS NOTES
CLINICAL NUTRITION SERVICES - BRIEF NOTE    Nutrition Prescription    RECOMMENDATIONS FOR MDs/PROVIDERS TO ORDER:  - none additional from full assessments     Recommendations already ordered by Registered Dietitian (RD):  - Continue EN as ordered. Meeting 85% of MREE.    Future/Additional Recommendations:  - Ongoing EN monitoring        Obtained metabolic cart study 5/27 @ 1700 with the following results: MREE = 2645 kcals/day (equiv to 38 kcal/kg/day) with RQ = 0.82.  Pt received 1440 ml of TF in 24 hours preceding the study providing 2240 (with Prosource) kcals (85 % MREE).  RQ is physiologic range; RQ is logical given provisions (1440 ml) received prior to study. However, pt did received propofol 5/27 and could be driving a higher RQ down.  Would aim energy intakes minimally at % of this MREE (equiv to 2116 - 2645 kcal/kg/day) and recommend staying closer to lower end.     Nutrition Progress Note - f/u for progress towards previous nutrition POC (see previous reassessment for details)     Veronica Ramirez RD, LD, [facility]  Neuro ICU  Pager: [phone]

## 2021-05-28 NOTE — PROGRESS NOTES
Neuroscience Intensive Care Progress Note  2021    Problem List  1.  Aneurysmal subarachnoid hemorrhage, HH 5, MF 4, bleed day 5/15  2.  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  Hydrocephalus status post EVD 5/15  4.  Hypertension  5.  Acute hypoxic respiratory failure  6.  Hypernatremia  7.  Hyperchloremia  8.  Leukocytosis  9.  Normocytic anemia    AE  -NAEO    Temp: 99.2  F (37.3  C) Temp  Min: 99.2  F (37.3  C)  Max: 100.6  F (38.1  C)  Resp: 23 Resp  Min: 17  Max: 35  SpO2: 98 % SpO2  Min: 93 %  Max: 99 %  Pulse: 79 Pulse  Min: 69  Max: 104    No data recorded  BP: 132/83 Systolic (24hrs), Av , Min:132 , Max:132   Diastolic (24hrs), Av, Min:83, Max:83      Ventilator Settings  Ventilation Mode: CMV/AC  (Continuous Mandatory Ventilation/ Assist Control)  FiO2 (%): 60 %  Rate Set (breaths/minute): 14 breaths/min  Tidal Volume Set (mL): 450 mL  PEEP (cm H2O): 7 cmH2O  Oxygen Concentration (%): 60 %  Peak Inspiratory Pressure (cm H2O) (Drager Marifer): 16  Resp: 23          Intake/Output Summary (Last 24 hours) at 2021 0717  Last data filed at 2021 0700  Gross per 24 hour   Intake 4391.91 ml   Output 3170 ml   Net 1221.91 ml         Current Medications:    doxazosin  1 mg Oral or Feeding Tube Daily     famotidine  20 mg Oral or Feeding Tube BID     heparin ANTICOAGULANT  5,000 Units Subcutaneous Q8H     insulin aspart  1-12 Units Subcutaneous Q4H     ipratropium - albuterol 0.5 mg/2.5 mg/3 mL  3 mL Nebulization Q6H     niMODipine  60 mg Oral or Feeding Tube Q4H MARAH    And     sodium chloride 0.9 %  10 mL Oral or Feeding Tube Q4H MARAH     piperacillin-tazobactam  4.5 g Intravenous Q6H     polyethylene glycol  17 g Oral or Feeding Tube Daily     potassium chloride  20 mEq Oral or Feeding Tube BID     protein modular  1 packet Per Feeding Tube TID     QUEtiapine  50 mg Oral or Feeding Tube BID     [Held by provider] senna-docusate  1 tablet Oral or Feeding Tube BID    Or      "[Held by provider] senna-docusate  2 tablet Oral or Feeding Tube BID       PRN Medications:  acetaminophen, bisacodyl, glucose **OR** dextrose **OR** glucagon, fentaNYL, hydrALAZINE, labetalol, naloxone **OR** naloxone **OR** naloxone **OR** naloxone, ondansetron **OR** ondansetron, prochlorperazine **OR** prochlorperazine **OR** prochlorperazine, QUEtiapine    Infusions:    fentaNYL 50 mcg/hr (05/28/21 0700)     propofol (DIPRIVAN) infusion 25 mcg/kg/min (05/28/21 0700)       No Known Allergies    Physical Examination:  Vitals: /83   Pulse 79   Temp 99.2  F (37.3  C) (Axillary)   Resp 23   Ht 1.803 m (5' 11\")   Wt 70.6 kg (155 lb 10.3 oz)   SpO2 98%   BMI 21.71 kg/m    General: Adult male patient, lying in bed, NAD  HEENT: Normocephalic/Atraumatic, no icterus, Oral cavity/Oropharynx pink and moist  Cardiac: RRR  Chest: No SOB, pattern regular, unlabored, expansion symmetric, no retractions or use of accessory muscles, assisted mechanically   Abdomen: Soft, bowel sounds present  Extremities: Warm, no edema  Skin: No rash or lesion   Psych: Restless, agitation   Neuro:  Mental status: Sedated, intubated, able to track examiner, intermittently follows commands, moves all four extremities spontaneously antigravity.  Cranial nerves: PERRL, conjugate gaze, pupils 3mm round and reactive, cough and gag intact with deep suctioning.   Motor: Normal bulk and tone. No abnormal movements. 3/5 strength in bilateral upper and lower extremities, moves all limbs spontaneously antigravity  Sensory: Withdraws to noxious stimuli in all four extremities  Coordination: deferred due to acuity  Gait: deferred due to acuity      Imaging:  I personally reviewed on labs and imaging in EMR.    Yesterday:  1. ETT advanced ~ 2cm  2. Repeat ABG showing improvement  3. TCDs wnl  4. Lasix 40mg x 1 for goal -1.5L; repeat CXR tmr AM    Plan today in brief:  1. Panculturing with CSF  2. LE doppler  3. Angio  4. FWF to " 50q2        Assessment/Plan  #Subarachnoid hemorrhage (MF4, HH5), aneurysmal; PBD13 HH5, mF4; POD13 R PCOM clipping;  - Neuro checks q2h  - SBP < 200  - Nimodipine 60 mg q4h  - Daily TCDs normalized yesterday, today: b/l BAYLEE vasospasm 160s and 180s  - HOB > 30 degrees;   - CTP- R temporal reduced CBV/CBF  - Angio today with mild vasospasm, s/p b/l ICA verapamil  - vEEG now  - CTH with dual energy now  - Continue to hold sedation    #IVH with subsequent hydrocephalus   #Brain compression with midline shift R>L  - EVD placed 5/15  - EVD remains at 10, ICP 5-12, drained 147 yesterday, 22cc since midnight;      #Agitation  - Will use propofol @ 30 with, fentanyl 50mcg/hr.   - Currently sedation being held will continue to hold for now    #Analgesics  - Tylenol 650 mg q4h PRN   - Fentanyl bumps 25-50 mcg q2h PRN     CV:  #HTN  ECHO completed on 5/17; EF 60-65%  - SBP wide range in setting suctioning and agitation with hypoxia  - SBP < 200 (100-160s)  - Holding PTA Lisinopril  - Hydralazine and Labetalol PRN  -  5/24     Pulm:  #Acute hypoxic respiratory failure  #mixed acid base disorder   #?compensated metabolic alkalosis from contraction alkalosis with respiratory compensation  Re-intubated 5/19  - CXR AM grossly unchanged from yesterday; oxygenation is improving; diuresis will likely worsen his base excess and trigger more respiratory compensation. Given his oxygenation, will hold off on diuresis today. Duonebs q6   - Following ABGs; 7.43/49/84/33/7.4 on CMV: 450/14/7/60%  - ETT remains in good position today.    #concern for PE w/persistent hypoxia and Large AA gradient (>300)  - CT-PE negative  - no utility in d-dimer in setting of active infection    Ventilation Mode: CMV/AC  (Continuous Mandatory Ventilation/ Assist Control)  FiO2 (%): 60 %  Rate Set (breaths/minute): 14 breaths/min  Tidal Volume Set (mL): 450 mL  PEEP (cm H2O): 7 cmH2O  Oxygen Concentration (%): 60 %  Peak Inspiratory Pressure (cm H2O)  (J Luis Caicedo): 16  Resp: 21       GI/Nutrition:  #Severe malnutrition in the context of acute on chronic illness  - Protein modular 1 Pkt  - Multivitamins w/minerals  - Diet: TF's @ goal (60)  - NG   - rectal tube out now  - Hold senna given increase stool output  - cdiff negative.     Renal:    I/o: +1.2L (3.1L UOP out; 3.8 in) yesterday;  +7.4L since admission;     #Hyperchloremia; 111  -  ml q2h to 50q2    #Urinary retention  - Doxazosin 1 mg daily      - IV Fluids: None  - BMP daily  - Electrolyte replacement protocol in place     Endo:  #Hyperglycemia   - Hgb A1c 5.5  - Follow glucose levels     Heme:  #Leukocytosis  #Normocyctic anemia  - CBC daily  - F/u lower extremity doppler     ID:  #febrile overnight; tmax 100.6 yesterday 4pm;   #WBC 30.9 from 28.5 from 23;  from 70 on 5/25; procal .12  #Aspiration pneumonia  #NGTD; gm stain with GNRs; 5/22 sputum with moderate growth pseudomonas, klebsiella which is pansensitive  - 5/28: bcx x 2, ua/ucx, sputum gm stain+cx, csf  -5/25: repeat panculture with heavy growth pseudomonas, previously pansensitive.   - 5/19 Sputum culture with heavy growth Klebsiella oxytoca; Heavy growth Staphylococcus epidermidis; Heavy growth beta hemolytic Streptococcus constellatus   - Sputum 5/22--with mod growth of Pseudomonas aeruginosa   - Ceftriaxone (5/21-5/23) was switched to Zosyn (5/23-present) for 7 day course  - BC 5/19, 5/22 with NGTD  - Will followup cultures for today; will not broaden for now.    PPX:  DVT Prophylaxis  - SCDs in place  - Heparin subcutaneous    GI Prophylaxis  - Famotidine 20 mg BID      Lines/Tubes/Drains:  - PIV x 1  - L subclavian CVC/TL 5/15  - R arterial line  - NG  - EVD  - Rectal tube; out.   - Continue to condom catheter     Code Status: Full code     Dispo: ICU    The patient was seen and discussed with the attending, Dr. Casillas.    David Sands MD  Neuro ICU Resident PGY-1

## 2021-05-28 NOTE — PROVIDER NOTIFICATION
When patient returned from IR, EVD was not draining, had no bounce and a very dampened waveform. Sx at bedside to assess and attempt to flush.

## 2021-05-28 NOTE — PLAN OF CARE
PT 4A: Cancel- Not appropriate for therapy today. Patient to IR this morning for cerebral angiogram 2/2 vasospasm. Will reschedule.

## 2021-05-28 NOTE — PROGRESS NOTES
Neurosurgery progress note    S: no acute events overnight    O:  Temp:  [98.4  F (36.9  C)-100.2  F (37.9  C)] 100.2  F (37.9  C)  Pulse:  [68-91] 77  Resp:  [7-32] 19  MAP:  [82 mmHg-147 mmHg] 82 mmHg  Arterial Line BP: (116-194)/() 162/58  FiO2 (%):  [45 %-70 %] 45 %  SpO2:  [87 %-100 %] 98 %    Exam:  Incision: c/d/i, drain and EVD in place  Intubated and heavily sedated  PERRL  FC LLE  Localizes LUE  W/d RLE  Trace movement RUE    Imaging  MRI brain 5/28/21  Impression:  1. Multifocal infarcts involving the right frontal, parietal,  temporal, and left parasagittal parietal lobes now subacute in age with associated edema, sulcal effacement, mass effect with grossly unchanged 11 mm of right-to-left midline shift. Basal cisterns are patent.  2. Extensive subarachnoid hemorrhages involving the bilateral cerebral convexities including the sylvian fissures, not significant changed.  3. Multifocal microhemorrhage/hemosiderin deposition in the bilateral basal ganglia, insula, left frontal lobe, and right cerebellar hemisphere. Hemosiderosis.  4. Stable ventriculomegaly. Left anterior approach ventriculostomy catheter in stable position.  5. Small right cerebral and cerebellar subdural hematoma.      ASSESSMENT: 70 year old man presented with AMS found to have aSAH with right supraclinoid aneurysm. mFS4 and HH5. GCS 5. S/p EVD placement, DCA,s/p aneurysm clipping on 5/16. Now s/p cerebral angiogram with verapamil injection for vasospasm.     The following conditions are also present, complicating the patient's current management, as described in the Assessment and Plan:   mechanical ventilation on day of admission, intracerebral hematoma, brain compression, acute respiratory failure, coma, based on current GCS of 5 and cerebral aneurysm rupture with hunt sims scale 5 modified lowery 4     Hypokalemia   Severe malnutrition in the context of acute on chronic illness  Respiratory  failure  Pneumonia  Vasospasm  Stroke       RECOMMENDATIONS:  - EVD: 10 open  - Q1hr neuro exam  - SBP<200  - HOB > 30 degrees  - wean sedation as possible  - follow-up clx  -- Vanc/Cefep for PNA  - Continuous cardiac monitoring while in ICU   - Nimodipine  - daily TCDs  - Glucose < 180  - Continue glucose checks  - Platelets > 100,000  - INR < 1.5  - Hemoglobin > 8  - DVT: SCDs while in bed  - Disposition: 4A  - PT/OT consulted        Samuel Nolasco MD, PhD  Neurosurgery Resident PGY-2    Please contact neurosurgery resident on call with questions.    Dial * * [phone], enter [phone] when prompted.    I have reviewed the history above and agree with the resident's assessment and plan.  RAYNA Maurer MD

## 2021-05-29 ENCOUNTER — APPOINTMENT (OUTPATIENT)
Dept: NEUROLOGY | Facility: CLINIC | Age: 71
DRG: 003 | End: 2021-05-29
Attending: STUDENT IN AN ORGANIZED HEALTH CARE EDUCATION/TRAINING PROGRAM
Payer: COMMERCIAL

## 2021-05-29 ENCOUNTER — APPOINTMENT (OUTPATIENT)
Dept: GENERAL RADIOLOGY | Facility: CLINIC | Age: 71
DRG: 003 | End: 2021-05-29
Attending: STUDENT IN AN ORGANIZED HEALTH CARE EDUCATION/TRAINING PROGRAM
Payer: COMMERCIAL

## 2021-05-29 ENCOUNTER — APPOINTMENT (OUTPATIENT)
Dept: ULTRASOUND IMAGING | Facility: CLINIC | Age: 71
DRG: 003 | End: 2021-05-29
Attending: STUDENT IN AN ORGANIZED HEALTH CARE EDUCATION/TRAINING PROGRAM
Payer: COMMERCIAL

## 2021-05-29 ENCOUNTER — APPOINTMENT (OUTPATIENT)
Dept: CT IMAGING | Facility: CLINIC | Age: 71
DRG: 003 | End: 2021-05-29
Attending: STUDENT IN AN ORGANIZED HEALTH CARE EDUCATION/TRAINING PROGRAM
Payer: COMMERCIAL

## 2021-05-29 LAB
ANION GAP SERPL CALCULATED.3IONS-SCNC: 4 MMOL/L (ref 3–14)
APPEARANCE CSF: ABNORMAL
BASOPHILS # BLD AUTO: 0.1 10E9/L (ref 0–0.2)
BASOPHILS NFR BLD AUTO: 0.2 %
BUN SERPL-MCNC: 23 MG/DL (ref 7–30)
CALCIUM SERPL-MCNC: 8.6 MG/DL (ref 8.5–10.1)
CHLORIDE SERPL-SCNC: 109 MMOL/L (ref 94–109)
CO2 SERPL-SCNC: 29 MMOL/L (ref 20–32)
COLOR CSF: ABNORMAL
CREAT SERPL-MCNC: 0.52 MG/DL (ref 0.66–1.25)
DIFFERENTIAL METHOD BLD: ABNORMAL
EOSINOPHIL # BLD AUTO: 0.2 10E9/L (ref 0–0.7)
EOSINOPHIL NFR BLD AUTO: 0.6 %
ERYTHROCYTE [DISTWIDTH] IN BLOOD BY AUTOMATED COUNT: 13.4 % (ref 10–15)
GFR SERPL CREATININE-BSD FRML MDRD: >90 ML/MIN/{1.73_M2}
GLUCOSE BLDC GLUCOMTR-MCNC: 134 MG/DL (ref 70–99)
GLUCOSE BLDC GLUCOMTR-MCNC: 144 MG/DL (ref 70–99)
GLUCOSE BLDC GLUCOMTR-MCNC: 150 MG/DL (ref 70–99)
GLUCOSE BLDC GLUCOMTR-MCNC: 161 MG/DL (ref 70–99)
GLUCOSE CSF-MCNC: 102 MG/DL (ref 40–70)
GLUCOSE CSF-MCNC: 80 MG/DL (ref 40–70)
GLUCOSE SERPL-MCNC: 153 MG/DL (ref 70–99)
GRAM STN SPEC: NORMAL
HCT VFR BLD AUTO: 26 % (ref 40–53)
HGB BLD-MCNC: 8.2 G/DL (ref 13.3–17.7)
IMM GRANULOCYTES # BLD: 0.4 10E9/L (ref 0–0.4)
IMM GRANULOCYTES NFR BLD: 1.8 %
LYMPH ABN NFR CSF MANUAL: 4 %
LYMPHOCYTES # BLD AUTO: 0.9 10E9/L (ref 0.8–5.3)
LYMPHOCYTES NFR BLD AUTO: 3.8 %
MAGNESIUM SERPL-MCNC: 2.4 MG/DL (ref 1.6–2.3)
MCH RBC QN AUTO: 30.6 PG (ref 26.5–33)
MCHC RBC AUTO-ENTMCNC: 31.5 G/DL (ref 31.5–36.5)
MCV RBC AUTO: 97 FL (ref 78–100)
MONOCYTES # BLD AUTO: 0.8 10E9/L (ref 0–1.3)
MONOCYTES NFR BLD AUTO: 3.3 %
MONOS+MACROS NFR CSF MANUAL: 8 %
NEUTROPHILS # BLD AUTO: 21.6 10E9/L (ref 1.6–8.3)
NEUTROPHILS NFR BLD AUTO: 90.3 %
NEUTROPHILS NFR CSF MANUAL: 88 %
NRBC # BLD AUTO: 0 10*3/UL
NRBC BLD AUTO-RTO: 0 /100
PHOSPHATE SERPL-MCNC: 2.4 MG/DL (ref 2.5–4.5)
PLATELET # BLD AUTO: 733 10E9/L (ref 150–450)
POTASSIUM SERPL-SCNC: 3.7 MMOL/L (ref 3.4–5.3)
PROCALCITONIN SERPL-MCNC: 0.14 NG/ML
PROT CSF-MCNC: 136 MG/DL (ref 15–60)
PROT CSF-MCNC: 225 MG/DL (ref 15–60)
RBC # BLD AUTO: 2.68 10E12/L (ref 4.4–5.9)
RBC # CSF MANUAL: ABNORMAL /UL (ref 0–2)
SODIUM SERPL-SCNC: 142 MMOL/L (ref 133–144)
SPECIMEN SOURCE: NORMAL
TUBE # CSF: ABNORMAL #
WBC # BLD AUTO: 24 10E9/L (ref 4–11)
WBC # CSF MANUAL: 54 /UL (ref 0–5)

## 2021-05-29 PROCEDURE — 250N000013 HC RX MED GY IP 250 OP 250 PS 637: Performed by: NEUROLOGICAL SURGERY

## 2021-05-29 PROCEDURE — 95714 VEEG EA 12-26 HR UNMNTR: CPT

## 2021-05-29 PROCEDURE — 258N000003 HC RX IP 258 OP 636: Performed by: STUDENT IN AN ORGANIZED HEALTH CARE EDUCATION/TRAINING PROGRAM

## 2021-05-29 PROCEDURE — 250N000011 HC RX IP 250 OP 636: Performed by: STUDENT IN AN ORGANIZED HEALTH CARE EDUCATION/TRAINING PROGRAM

## 2021-05-29 PROCEDURE — 250N000013 HC RX MED GY IP 250 OP 250 PS 637: Performed by: STUDENT IN AN ORGANIZED HEALTH CARE EDUCATION/TRAINING PROGRAM

## 2021-05-29 PROCEDURE — 87205 SMEAR GRAM STAIN: CPT | Performed by: STUDENT IN AN ORGANIZED HEALTH CARE EDUCATION/TRAINING PROGRAM

## 2021-05-29 PROCEDURE — 258N000003 HC RX IP 258 OP 636: Performed by: NEUROLOGICAL SURGERY

## 2021-05-29 PROCEDURE — 70498 CT ANGIOGRAPHY NECK: CPT

## 2021-05-29 PROCEDURE — 83735 ASSAY OF MAGNESIUM: CPT | Performed by: STUDENT IN AN ORGANIZED HEALTH CARE EDUCATION/TRAINING PROGRAM

## 2021-05-29 PROCEDURE — 250N000009 HC RX 250: Performed by: STUDENT IN AN ORGANIZED HEALTH CARE EDUCATION/TRAINING PROGRAM

## 2021-05-29 PROCEDURE — 71045 X-RAY EXAM CHEST 1 VIEW: CPT | Mod: 26 | Performed by: RADIOLOGY

## 2021-05-29 PROCEDURE — 85025 COMPLETE CBC W/AUTO DIFF WBC: CPT | Performed by: STUDENT IN AN ORGANIZED HEALTH CARE EDUCATION/TRAINING PROGRAM

## 2021-05-29 PROCEDURE — 87075 CULTR BACTERIA EXCEPT BLOOD: CPT | Performed by: STUDENT IN AN ORGANIZED HEALTH CARE EDUCATION/TRAINING PROGRAM

## 2021-05-29 PROCEDURE — 94640 AIRWAY INHALATION TREATMENT: CPT

## 2021-05-29 PROCEDURE — 999N000065 XR CHEST PORT 1 VIEW

## 2021-05-29 PROCEDURE — 89051 BODY FLUID CELL COUNT: CPT | Performed by: NURSE PRACTITIONER

## 2021-05-29 PROCEDURE — 999N000128 HC STATISTIC PERIPHERAL IV START W/O US GUIDANCE

## 2021-05-29 PROCEDURE — 94003 VENT MGMT INPAT SUBQ DAY: CPT

## 2021-05-29 PROCEDURE — 999N000157 HC STATISTIC RCP TIME EA 10 MIN

## 2021-05-29 PROCEDURE — 94640 AIRWAY INHALATION TREATMENT: CPT | Mod: 76

## 2021-05-29 PROCEDURE — 80048 BASIC METABOLIC PNL TOTAL CA: CPT | Performed by: STUDENT IN AN ORGANIZED HEALTH CARE EDUCATION/TRAINING PROGRAM

## 2021-05-29 PROCEDURE — 999N000185 HC STATISTIC TRANSPORT TIME EA 15 MIN

## 2021-05-29 PROCEDURE — 99207 CT HEAD PERFUSION WITH CONTRAST: CPT | Mod: 26 | Performed by: RADIOLOGY

## 2021-05-29 PROCEDURE — 82945 GLUCOSE OTHER FLUID: CPT | Performed by: STUDENT IN AN ORGANIZED HEALTH CARE EDUCATION/TRAINING PROGRAM

## 2021-05-29 PROCEDURE — 0042T CT HEAD PERFUSION WITH CONTRAST: CPT

## 2021-05-29 PROCEDURE — 84157 ASSAY OF PROTEIN OTHER: CPT | Performed by: STUDENT IN AN ORGANIZED HEALTH CARE EDUCATION/TRAINING PROGRAM

## 2021-05-29 PROCEDURE — 95720 EEG PHY/QHP EA INCR W/VEEG: CPT | Performed by: PSYCHIATRY & NEUROLOGY

## 2021-05-29 PROCEDURE — 71045 X-RAY EXAM CHEST 1 VIEW: CPT

## 2021-05-29 PROCEDURE — 200N000002 HC R&B ICU UMMC

## 2021-05-29 PROCEDURE — 250N000011 HC RX IP 250 OP 636: Performed by: NEUROLOGICAL SURGERY

## 2021-05-29 PROCEDURE — A7035 POS AIRWAY PRESS HEADGEAR: HCPCS

## 2021-05-29 PROCEDURE — 999N001017 HC STATISTIC GLUCOSE BY METER IP

## 2021-05-29 PROCEDURE — 999N000076 HC STATISTIC ICP MONITORING

## 2021-05-29 PROCEDURE — 93886 INTRACRANIAL COMPLETE STUDY: CPT

## 2021-05-29 PROCEDURE — 250N000009 HC RX 250: Performed by: PSYCHIATRY & NEUROLOGY

## 2021-05-29 PROCEDURE — 70498 CT ANGIOGRAPHY NECK: CPT | Mod: 26 | Performed by: RADIOLOGY

## 2021-05-29 PROCEDURE — 999N000015 HC STATISTIC ARTERIAL MONITORING DAILY

## 2021-05-29 PROCEDURE — 87070 CULTURE OTHR SPECIMN AEROBIC: CPT | Performed by: STUDENT IN AN ORGANIZED HEALTH CARE EDUCATION/TRAINING PROGRAM

## 2021-05-29 PROCEDURE — 87015 SPECIMEN INFECT AGNT CONCNTJ: CPT | Performed by: STUDENT IN AN ORGANIZED HEALTH CARE EDUCATION/TRAINING PROGRAM

## 2021-05-29 PROCEDURE — 70496 CT ANGIOGRAPHY HEAD: CPT | Mod: 26 | Performed by: RADIOLOGY

## 2021-05-29 PROCEDURE — 84100 ASSAY OF PHOSPHORUS: CPT | Performed by: STUDENT IN AN ORGANIZED HEALTH CARE EDUCATION/TRAINING PROGRAM

## 2021-05-29 PROCEDURE — 250N000013 HC RX MED GY IP 250 OP 250 PS 637: Performed by: NURSE PRACTITIONER

## 2021-05-29 PROCEDURE — 99291 CRITICAL CARE FIRST HOUR: CPT | Mod: 25 | Performed by: PSYCHIATRY & NEUROLOGY

## 2021-05-29 PROCEDURE — 84145 PROCALCITONIN (PCT): CPT | Performed by: STUDENT IN AN ORGANIZED HEALTH CARE EDUCATION/TRAINING PROGRAM

## 2021-05-29 PROCEDURE — 250N000011 HC RX IP 250 OP 636: Performed by: NURSE PRACTITIONER

## 2021-05-29 PROCEDURE — 93886 INTRACRANIAL COMPLETE STUDY: CPT | Mod: 26 | Performed by: RADIOLOGY

## 2021-05-29 RX ORDER — IOPAMIDOL 755 MG/ML
115 INJECTION, SOLUTION INTRAVASCULAR ONCE
Status: COMPLETED | OUTPATIENT
Start: 2021-05-29 | End: 2021-05-29

## 2021-05-29 RX ORDER — POTASSIUM CHLORIDE 1.5 G/1.58G
20 POWDER, FOR SOLUTION ORAL ONCE
Status: COMPLETED | OUTPATIENT
Start: 2021-05-29 | End: 2021-05-29

## 2021-05-29 RX ORDER — DEXMEDETOMIDINE HYDROCHLORIDE 4 UG/ML
0.2-0.7 INJECTION, SOLUTION INTRAVENOUS CONTINUOUS
Status: DISCONTINUED | OUTPATIENT
Start: 2021-05-29 | End: 2021-06-03

## 2021-05-29 RX ORDER — NOREPINEPHRINE BITARTRATE 0.06 MG/ML
.01-.6 INJECTION, SOLUTION INTRAVENOUS CONTINUOUS
Status: DISCONTINUED | OUTPATIENT
Start: 2021-05-29 | End: 2021-06-01

## 2021-05-29 RX ORDER — MILRINONE LACTATE 0.2 MG/ML
0.25 INJECTION, SOLUTION INTRAVENOUS CONTINUOUS
Status: DISCONTINUED | OUTPATIENT
Start: 2021-05-29 | End: 2021-06-06

## 2021-05-29 RX ADMIN — NIMODIPINE 60 MG: 30 SOLUTION ORAL at 04:26

## 2021-05-29 RX ADMIN — Medication 100 MCG/HR: at 21:22

## 2021-05-29 RX ADMIN — DEXMEDETOMIDINE 0.2 MCG/KG/HR: 100 INJECTION, SOLUTION, CONCENTRATE INTRAVENOUS at 19:03

## 2021-05-29 RX ADMIN — POTASSIUM CHLORIDE 20 MEQ: 1.5 POWDER, FOR SOLUTION ORAL at 20:59

## 2021-05-29 RX ADMIN — POTASSIUM CHLORIDE 20 MEQ: 1.5 POWDER, FOR SOLUTION ORAL at 13:22

## 2021-05-29 RX ADMIN — Medication 10 ML: at 00:30

## 2021-05-29 RX ADMIN — HEPARIN SODIUM 5000 UNITS: 5000 INJECTION, SOLUTION INTRAVENOUS; SUBCUTANEOUS at 08:14

## 2021-05-29 RX ADMIN — Medication 10 ML: at 08:14

## 2021-05-29 RX ADMIN — NIMODIPINE 60 MG: 30 SOLUTION ORAL at 17:06

## 2021-05-29 RX ADMIN — POTASSIUM CHLORIDE 20 MEQ: 1.5 POWDER, FOR SOLUTION ORAL at 06:38

## 2021-05-29 RX ADMIN — NIMODIPINE 60 MG: 30 SOLUTION ORAL at 12:12

## 2021-05-29 RX ADMIN — FAMOTIDINE 20 MG: 20 TABLET ORAL at 08:13

## 2021-05-29 RX ADMIN — IPRATROPIUM BROMIDE AND ALBUTEROL SULFATE 3 ML: 2.5; .5 SOLUTION RESPIRATORY (INHALATION) at 19:30

## 2021-05-29 RX ADMIN — CEFEPIME 2 G: 2 INJECTION, POWDER, FOR SOLUTION INTRAVENOUS at 17:15

## 2021-05-29 RX ADMIN — VANCOMYCIN HYDROCHLORIDE 1500 MG: 10 INJECTION, POWDER, LYOPHILIZED, FOR SOLUTION INTRAVENOUS at 17:35

## 2021-05-29 RX ADMIN — IPRATROPIUM BROMIDE AND ALBUTEROL SULFATE 3 ML: 2.5; .5 SOLUTION RESPIRATORY (INHALATION) at 02:46

## 2021-05-29 RX ADMIN — POTASSIUM & SODIUM PHOSPHATES POWDER PACK 280-160-250 MG 1 PACKET: 280-160-250 PACK at 08:14

## 2021-05-29 RX ADMIN — IPRATROPIUM BROMIDE AND ALBUTEROL SULFATE 3 ML: 2.5; .5 SOLUTION RESPIRATORY (INHALATION) at 13:03

## 2021-05-29 RX ADMIN — POLYETHYLENE GLYCOL 3350 17 G: 17 POWDER, FOR SOLUTION ORAL at 08:14

## 2021-05-29 RX ADMIN — Medication 1 PACKET: at 08:16

## 2021-05-29 RX ADMIN — Medication 10 ML: at 13:23

## 2021-05-29 RX ADMIN — POTASSIUM & SODIUM PHOSPHATES POWDER PACK 280-160-250 MG 1 PACKET: 280-160-250 PACK at 14:16

## 2021-05-29 RX ADMIN — ACETAMINOPHEN 650 MG: 325 TABLET, FILM COATED ORAL at 14:16

## 2021-05-29 RX ADMIN — Medication 50 MCG/HR: at 20:51

## 2021-05-29 RX ADMIN — Medication 10 ML: at 17:06

## 2021-05-29 RX ADMIN — HEPARIN SODIUM 5000 UNITS: 5000 INJECTION, SOLUTION INTRAVENOUS; SUBCUTANEOUS at 17:15

## 2021-05-29 RX ADMIN — HYDRALAZINE HYDROCHLORIDE 10 MG: 20 INJECTION INTRAMUSCULAR; INTRAVENOUS at 12:10

## 2021-05-29 RX ADMIN — QUETIAPINE 50 MG: 50 TABLET ORAL at 08:13

## 2021-05-29 RX ADMIN — MILRINONE LACTATE IN DEXTROSE 1 MCG/KG/MIN: 200 INJECTION, SOLUTION INTRAVENOUS at 17:33

## 2021-05-29 RX ADMIN — FAMOTIDINE 20 MG: 20 TABLET ORAL at 20:59

## 2021-05-29 RX ADMIN — POTASSIUM & SODIUM PHOSPHATES POWDER PACK 280-160-250 MG 1 PACKET: 280-160-250 PACK at 20:59

## 2021-05-29 RX ADMIN — CEFEPIME 2 G: 2 INJECTION, POWDER, FOR SOLUTION INTRAVENOUS at 10:04

## 2021-05-29 RX ADMIN — Medication 1 PACKET: at 20:59

## 2021-05-29 RX ADMIN — Medication 10 ML: at 04:26

## 2021-05-29 RX ADMIN — NIMODIPINE 60 MG: 30 SOLUTION ORAL at 20:59

## 2021-05-29 RX ADMIN — INSULIN ASPART 1 UNITS: 100 INJECTION, SOLUTION INTRAVENOUS; SUBCUTANEOUS at 08:15

## 2021-05-29 RX ADMIN — INSULIN ASPART 1 UNITS: 100 INJECTION, SOLUTION INTRAVENOUS; SUBCUTANEOUS at 04:26

## 2021-05-29 RX ADMIN — Medication 0.03 MCG/KG/MIN: at 17:33

## 2021-05-29 RX ADMIN — MILRINONE LACTATE IN DEXTROSE 1 MCG/KG/MIN: 200 INJECTION, SOLUTION INTRAVENOUS at 21:21

## 2021-05-29 RX ADMIN — HYDRALAZINE HYDROCHLORIDE 20 MG: 20 INJECTION INTRAMUSCULAR; INTRAVENOUS at 19:55

## 2021-05-29 RX ADMIN — IPRATROPIUM BROMIDE AND ALBUTEROL SULFATE 3 ML: 2.5; .5 SOLUTION RESPIRATORY (INHALATION) at 08:32

## 2021-05-29 RX ADMIN — VANCOMYCIN HYDROCHLORIDE 1500 MG: 10 INJECTION, POWDER, LYOPHILIZED, FOR SOLUTION INTRAVENOUS at 08:13

## 2021-05-29 RX ADMIN — IOPAMIDOL 115 ML: 755 INJECTION, SOLUTION INTRAVENOUS at 12:30

## 2021-05-29 RX ADMIN — FENTANYL CITRATE 50 MCG: 50 INJECTION, SOLUTION INTRAMUSCULAR; INTRAVENOUS at 19:59

## 2021-05-29 RX ADMIN — NIMODIPINE 60 MG: 30 SOLUTION ORAL at 08:14

## 2021-05-29 RX ADMIN — Medication 1 PACKET: at 14:16

## 2021-05-29 RX ADMIN — Medication 10 ML: at 20:59

## 2021-05-29 RX ADMIN — CEFEPIME 2 G: 2 INJECTION, POWDER, FOR SOLUTION INTRAVENOUS at 02:08

## 2021-05-29 RX ADMIN — INSULIN ASPART 1 UNITS: 100 INJECTION, SOLUTION INTRAVENOUS; SUBCUTANEOUS at 13:23

## 2021-05-29 RX ADMIN — DOXAZOSIN 1 MG: 1 TABLET ORAL at 08:13

## 2021-05-29 ASSESSMENT — VISUAL ACUITY
OU: OTHER (SEE COMMENT);GLASSES

## 2021-05-29 ASSESSMENT — ACTIVITIES OF DAILY LIVING (ADL)
ADLS_ACUITY_SCORE: 24

## 2021-05-29 NOTE — PHARMACY-VANCOMYCIN DOSING SERVICE
Pharmacy Vancomycin Initial Note  Date of Service May 28, 2021  Patient's  1950  70 year old, male    Indication: ventriculitis    Current estimated CrCl = Estimated Creatinine Clearance: 126.1 mL/min (A) (based on SCr of 0.56 mg/dL (L)).    Creatinine for last 3 days  2021:  4:14 AM Creatinine 0.57 mg/dL;  8:01 PM Creatinine 0.54 mg/dL  2021:  3:50 AM Creatinine 0.60 mg/dL  2021:  4:00 AM Creatinine 0.56 mg/dL    Recent Vancomycin Level(s) for last 3 days  No results found for requested labs within last 72 hours.      Vancomycin IV Administrations (past 72 hours)                   vancomycin (VANCOCIN) 1,750 mg in sodium chloride 0.9 % 500 mL intermittent infusion (mg) 1,750 mg New Bag 21 1814              Nephrotoxins and other renal medications (From now, onward)    Start     Dose/Rate Route Frequency Ordered Stop    21 0600  vancomycin 1500 mg in 0.9% NaCl 250 ml intermittent infusion 1,500 mg      1,500 mg  over 90 Minutes Intravenous EVERY 12 HOURS 21 1756      21 1800  vancomycin (VANCOCIN) 1,750 mg in sodium chloride 0.9 % 500 mL intermittent infusion      1,750 mg  over 120 Minutes Intravenous ONCE 21 1756          Contrast Orders - past 72 hours (72h ago, onward)    Start     Dose/Rate Route Frequency Ordered Stop    21 1800  gadobutrol (GADAVIST) injection 7.5 mL      7.5 mL Intravenous ONCE 21 1736 21 1736    21 1000  iodixanol (VISIPAQUE 320) injection 150 mL      150 mL Intravenous ONCE 21 0954 21 1054    21 1130  iopamidol (ISOVUE-370) solution 55 mL      55 mL Intravenous ONCE 21 1122 21 1128         Plan:  1. Start vancomycin  1750 mg IV x1 followed by vancomycin 1500mg q12h.   2. Vancomycin monitoring method: Trough (Method 2 = manual dose calculation)  3. Vancomycin therapeutic monitoring goal: 15-20 mg/L  4. Pharmacy will check vancomycin levels as appropriate in 1-3 Days.    5. Serum  creatinine levels will be ordered daily for the first week of therapy and at least twice weekly for subsequent weeks.      Denise Varela, PharmD, BCPS

## 2021-05-29 NOTE — PLAN OF CARE
Major Shift Events: Pupils equal and reactive, spontaneously moving left arm and wiggling left toes. RLE withdrawing from pain, RUE not responding to stimuli. EVD @ 10 above EAM, ICP 4-10, 3-15 ml out per hour. SBP < 200, SR overnight, t-max 100.2. CMV settings, pt coughing with stimulation and having thick yellow secretions. NJ with TF to goal. Condom cath active. NS @ 75 ml/hr and fentanyl @ 50 mcg/hr. Left subclavian dressing changed. K and phos replacements today, WBC trending down. Chest XR overnight.    Plan: Continue to monitor neuro status and potential for cerebral angio today.  For vital signs and complete assessments, please see documentation flowsheets.

## 2021-05-29 NOTE — PROGRESS NOTES
Neuroscience Intensive Care Progress Note  05/29/2021    Problem List  1.  Aneurysmal subarachnoid hemorrhage, HH 5, MF 4, bleed day 5/15  2.  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  Hydrocephalus status post EVD 5/15  4.  Acute Bilateral Hemispheric Infarct  5. Delayed Cerebral Ischemia  6.  Hypertension  7.  Acute hypoxic respiratory failure  8.  Hypernatremia  9.  Hyperchloremia  12. Leukocytosis  13. Normocytic anemia  14. Probable Ventriculitis     AE  -Repeat CT/MRI shows completed infarct in bilateral BAYLEE territory infarct.     Temp: 100.2  F (37.9  C) Temp  Min: 98.4  F (36.9  C)  Max: 100.2  F (37.9  C)  Resp: 19 Resp  Min: 7  Max: 32  SpO2: 98 % SpO2  Min: 87 %  Max: 100 %  Pulse: 77 Pulse  Min: 68  Max: 91    No data recorded    No data recorded.  No data recorded.      Ventilator Settings  Ventilation Mode: CMV/AC  (Continuous Mandatory Ventilation/ Assist Control)  FiO2 (%): 45 %  Rate Set (breaths/minute): 14 breaths/min  Tidal Volume Set (mL): 450 mL  PEEP (cm H2O): 7 cmH2O  Oxygen Concentration (%): 45 %  Resp: 19      Intake/Output Summary (Last 24 hours) at 5/29/2021 0935  Last data filed at 5/29/2021 0700  Gross per 24 hour   Intake 3794.33 ml   Output 1962 ml   Net 1832.33 ml             Current Medications:    ceFEPIme (MAXIPIME) IV  2 g Intravenous Q8H     doxazosin  1 mg Oral or Feeding Tube Daily     famotidine  20 mg Oral or Feeding Tube BID     heparin ANTICOAGULANT  5,000 Units Subcutaneous Q8H     insulin aspart  1-12 Units Subcutaneous Q4H     ipratropium - albuterol 0.5 mg/2.5 mg/3 mL  3 mL Nebulization Q6H     niMODipine  60 mg Oral or Feeding Tube Q4H MARAH    And     sodium chloride 0.9 %  10 mL Oral or Feeding Tube Q4H MARAH     polyethylene glycol  17 g Oral or Feeding Tube Daily     potassium & sodium phosphates  1 packet Oral TID     potassium chloride  20 mEq Oral or Feeding Tube BID     protein modular  1 packet Per Feeding Tube TID     QUEtiapine  50 mg Oral  "or Feeding Tube BID     [Held by provider] senna-docusate  1 tablet Oral or Feeding Tube BID    Or     [Held by provider] senna-docusate  2 tablet Oral or Feeding Tube BID     vancomycin (VANCOCIN) IV  1,500 mg Intravenous Q12H       PRN Medications:  acetaminophen, bisacodyl, glucose **OR** dextrose **OR** glucagon, fentaNYL, hydrALAZINE, labetalol, metoclopramide **OR** metoclopramide, naloxone **OR** naloxone **OR** naloxone **OR** naloxone, ondansetron **OR** ondansetron, prochlorperazine **OR** prochlorperazine **OR** prochlorperazine, QUEtiapine    Infusions:    fentaNYL 50 mcg/hr (05/29/21 0700)     propofol (DIPRIVAN) infusion Stopped (05/28/21 0755)     sodium chloride 75 mL/hr at 05/29/21 0700       No Known Allergies    Physical Examination:  Vitals: /83   Pulse 77   Temp 100.2  F (37.9  C) (Axillary)   Resp 19   Ht 1.803 m (5' 11\")   Wt 70.6 kg (155 lb 10.3 oz)   SpO2 98%   BMI 21.71 kg/m    General: Adult male patient, lying in bed, NAD  HEENT: Normocephalic/Atraumatic, no icterus, Oral cavity/Oropharynx pink and moist  Cardiac: RRR  Chest: No SOB, pattern regular, unlabored, expansion symmetric, no retractions or use of accessory muscles, assisted mechanically   Abdomen: Soft, bowel sounds present  Extremities: Warm, no edema  Skin: No rash or lesion   Psych: Restless, agitation   Neuro:  Mental status: Sedated, intubated, able to track examiner, intermittently follows commands in left arm and leg.  Cranial nerves: PERRL, conjugate gaze, pupils 3mm round and reactive, cough and gag intact with deep suctioning.   Motor: Normal bulk and tone. No abnormal movements. antigravity in left arm and leg. 0/5 RUE/RLE  Sensory: Withdraws to noxious stimuli in left upper and lower  Coordination: deferred due to acuity  Gait: deferred due to acuity      Imaging:  I personally reviewed on labs and imaging in EMR.    Yesterday:  1. Repeat CT/MRI shows completed infarct in bilateral BAYLEE territory infarct. "   2. TCDs shows bilateral BAYLEE with elevated velocities  3. Angiogram revealed mild vasospasm and IA verapamil given       Plan today in brief:  1. Discontinue fentanyl gtt   2. Draw CSF   3. CT/CTP  4. Hold sedation       Assessment/Plan  #Subarachnoid hemorrhage (MF4, HH5), aneurysmal; PBD14 HH5, mF4; POD14 R PCOM clipping;  - Neuro checks q2h  - SBP < 200  - Nimodipine 60 mg q4h  - Daily TCDs yesterday &  today: b/l BAYLEE vasospasm 160s and 180s  - HOB > 30 degrees;   - vEEG now, slowing over right hemisphere.   - Hold sedation    #Bilateral Delayed Cerebral Ischemia, likely from vasospasm   - angio yesterday with mild vasospasm, s/p b/l ICA verapamil  - CT/CTP ordered today     #IVH with subsequent hydrocephalus   #Brain compression with midline shift R>L  - EVD placed 5/15  - EVD remains at 10, ICP 6-1, drained 75 yesterday, 65cc since midnight;      #Agitation  - Will use propofol @ 30 with, fentanyl 50mcg/hr.   - Currently sedation being held will continue to hold for now    #Analgesics  - Tylenol 650 mg q4h PRN   - Fentanyl bumps 25-50 mcg q2h PRN     CV:  #HTN  ECHO completed on 5/17 + 5/27; EF 60-65%  - SBP wide range in setting suctioning and agitation with hypoxia  - SBP < 200 (100-160s)  - Holding PTA Lisinopril  - Hydralazine and Labetalol PRN  -  5/24     Pulm:  #Acute hypoxic respiratory failure  #mixed acid base disorder   #?compensated metabolic alkalosis from contraction alkalosis with respiratory compensation  Re-intubated 5/19  - CXR AM grossly unchanged from yesterday; oxygenation is improving; diuresis will likely worsen his base excess and trigger more respiratory compensation. Given his oxygenation, will hold off on diuresis today. Duonebs q6   - Following ABGs; 7.43/49/84/33/7.4 on CMV: 450/14/7/60%  - ETT remains in good position today.      Ventilation Mode: CMV/AC  (Continuous Mandatory Ventilation/ Assist Control)  FiO2 (%): 45 %  Rate Set (breaths/minute): 14 breaths/min  Tidal  Volume Set (mL): 450 mL  PEEP (cm H2O): 7 cmH2O  Oxygen Concentration (%): 45 %  Resp: 19       GI/Nutrition:  #Severe malnutrition in the context of acute on chronic illness  - Protein modular 1 Pkt  - Multivitamins w/minerals  - Diet: TF's @ goal (60)  - NG   - rectal tube out now  - resume senna   - cdiff negative.     Renal: I/o: Net +1L (2.8LUOP out; 3.8 in) yesterday;  +9.6L since admission;   - FWF  50q2    #Urinary retention  - Doxazosin 1 mg daily   - IV Fluids: None  - BMP daily  - Electrolyte replacement protocol in place     Endo:  #Hyperglycemia   - Hgb A1c 5.5  - Follow glucose levels     Heme:  #Leukocytosis, improving  #Normocyctic anemia  - CBC daily  - UE/LE duplex negative     ID:  #febrile overnight; tmax 100.2   #WBC 24 from 30.3 from 23; procal 0.14 today  #Aspiration pneumonia  #Probable Ventriculitis   #NGTD; gm stain with GNRs; 5/22 sputum with moderate growth pseudomonas, klebsiella which is pansensitive  - 5/28: sputum with pseudomonas aeruginosa, csf gram stain/cx NGTD  -5/25: repeat panculture with heavy growth pseudomonas, previously pansensitive.   - 5/19 Sputum culture with heavy growth Klebsiella oxytoca; Heavy growth Staphylococcus epidermidis; Heavy growth beta hemolytic Streptococcus constellatus   - Ceftriaxone (5/21-5/23) was switched to Zosyn (5/23-5/28) switch to cefepime and vancomycin due to elevated WBC and fever  - will continue vanc/cefepime as WBC improving after switching, will narrow as indicated    PPX:  DVT Prophylaxis  - SCDs in place  - Heparin subcutaneous    GI Prophylaxis  - Famotidine 20 mg BID      Lines/Tubes/Drains:  - PIV x 1  - L subclavian CVC/TL 5/15  - R arterial line  - NG  - EVD  - Rectal tube; out.   - Continue to condom catheter     Code Status: Full code     Dispo: ICU    The patient was seen and discussed with the attending, Dr. Starr.    Jose Murdock MD  Vascular Neurology Fellow  To page me or covering stroke neurology team member, click here:  "[facility]   Choose \"On Call\" tab at top, then search dropdown box for \"Neurology Adult\", select location, press Enter, then look for stroke/neuro ICU/telestroke.        "

## 2021-05-29 NOTE — PLAN OF CARE
Neuro: Up to chair x4 hours. PERRL. Spontaneously moves LUE/LLE and withdraws in RLE, RUE does not respond. EVD @10 above EAM- ICPs 1-11, output 2-10 mL/hr.   -Milrinone started at 1730, goal CPP 60-70 and has not needed levo.      Cardiac:  TMax 98.4. Hydralazine given x1 for SBP sustained >200.     Resp: Tolerating PS 7/5 starting at 1600. Thick yellow secretions.    GI/: Primofit with adequate UOP. Tolerating TF at goal 60 mL/hr. Small BM x1.     Plan: Milrinone gtts, maintain CPP 60-70.

## 2021-05-29 NOTE — PROGRESS NOTES
Neurosurgery brief note    TCD today showed continued vasospasm of BL BAYLEE, CTA showed the same, CTP showed 15ml of mismatch. Discussed with neuro IR fellow who recommends hold off on formal diagnostic angiogram/angioplasty.     Please contact neurosurgery resident on call with questions.    Dial * * [phone], enter [phone] when prompted.     Adrian Paredes MD  Neurosurgery PGY3

## 2021-05-29 NOTE — PROGRESS NOTES
"S:   Slightly improved neuro exam overnight  CTH and MRI show subacute R>L BAYLEE infarct, likely occurred Wed/thurs when attempted to bring down to angio, however was respiratorily unstable  Yesterdays formal angiogram showed minimal bilateral BAYLEE vasospasm despite TCDs.    Slightly improved WBC 30 -> 25 today, treating for aspiration PNA    O:  /83   Pulse 81   Temp 98  F (36.7  C) (Axillary)   Resp 24   Ht 1.803 m (5' 11\")   Wt 70.6 kg (155 lb 10.3 oz)   SpO2 95%   BMI 21.71 kg/m    Spontaneously moves LUE/LLE and w/draws in RLE, RUE does not respond    Subarachnoid hemorrhage (MF4, HH5), aneurysmal; PBD14 HH5, mF4; POD14 R PCOM clipping with completed bilateral BAYLEE infarcts.  Taken to angio on 5/28 showing mild vasospasm of bilateral ACAs, given verapamil in bilateral ICAs.  [] will hold off on angio at this time given improvement in exam    Discussed with Dr Alexandre  "

## 2021-05-30 ENCOUNTER — APPOINTMENT (OUTPATIENT)
Dept: ULTRASOUND IMAGING | Facility: CLINIC | Age: 71
DRG: 003 | End: 2021-05-30
Attending: STUDENT IN AN ORGANIZED HEALTH CARE EDUCATION/TRAINING PROGRAM
Payer: COMMERCIAL

## 2021-05-30 ENCOUNTER — APPOINTMENT (OUTPATIENT)
Dept: CT IMAGING | Facility: CLINIC | Age: 71
DRG: 003 | End: 2021-05-30
Attending: STUDENT IN AN ORGANIZED HEALTH CARE EDUCATION/TRAINING PROGRAM
Payer: COMMERCIAL

## 2021-05-30 ENCOUNTER — ANESTHESIA EVENT (OUTPATIENT)
Dept: INTENSIVE CARE | Facility: CLINIC | Age: 71
DRG: 003 | End: 2021-05-30
Payer: COMMERCIAL

## 2021-05-30 ENCOUNTER — APPOINTMENT (OUTPATIENT)
Dept: GENERAL RADIOLOGY | Facility: CLINIC | Age: 71
DRG: 003 | End: 2021-05-30
Attending: NEUROLOGICAL SURGERY
Payer: COMMERCIAL

## 2021-05-30 ENCOUNTER — APPOINTMENT (OUTPATIENT)
Dept: GENERAL RADIOLOGY | Facility: CLINIC | Age: 71
DRG: 003 | End: 2021-05-30
Attending: STUDENT IN AN ORGANIZED HEALTH CARE EDUCATION/TRAINING PROGRAM
Payer: COMMERCIAL

## 2021-05-30 ENCOUNTER — ANESTHESIA (OUTPATIENT)
Dept: INTENSIVE CARE | Facility: CLINIC | Age: 71
DRG: 003 | End: 2021-05-30
Payer: COMMERCIAL

## 2021-05-30 ENCOUNTER — APPOINTMENT (OUTPATIENT)
Dept: NEUROLOGY | Facility: CLINIC | Age: 71
DRG: 003 | End: 2021-05-30
Attending: STUDENT IN AN ORGANIZED HEALTH CARE EDUCATION/TRAINING PROGRAM
Payer: COMMERCIAL

## 2021-05-30 LAB
ABO + RH BLD: NORMAL
ABO + RH BLD: NORMAL
ANION GAP SERPL CALCULATED.3IONS-SCNC: 4 MMOL/L (ref 3–14)
APPEARANCE CSF: ABNORMAL
BACTERIA SPEC CULT: NO GROWTH
BASE EXCESS BLDA CALC-SCNC: 5 MMOL/L
BASE EXCESS BLDA CALC-SCNC: 5.2 MMOL/L
BASOPHILS NFR CSF MANUAL: 1 %
BLD GP AB SCN SERPL QL: NORMAL
BLOOD BANK CMNT PATIENT-IMP: NORMAL
BUN SERPL-MCNC: 27 MG/DL (ref 7–30)
CALCIUM SERPL-MCNC: 8.8 MG/DL (ref 8.5–10.1)
CHLORIDE SERPL-SCNC: 112 MMOL/L (ref 94–109)
CO2 SERPL-SCNC: 29 MMOL/L (ref 20–32)
COLOR CSF: ABNORMAL
CREAT SERPL-MCNC: 0.55 MG/DL (ref 0.66–1.25)
CRP SERPL-MCNC: 120 MG/L (ref 0–8)
ERYTHROCYTE [DISTWIDTH] IN BLOOD BY AUTOMATED COUNT: 13.5 % (ref 10–15)
GFR SERPL CREATININE-BSD FRML MDRD: >90 ML/MIN/{1.73_M2}
GLUCOSE BLDC GLUCOMTR-MCNC: 136 MG/DL (ref 70–99)
GLUCOSE BLDC GLUCOMTR-MCNC: 149 MG/DL (ref 70–99)
GLUCOSE BLDC GLUCOMTR-MCNC: 154 MG/DL (ref 70–99)
GLUCOSE BLDC GLUCOMTR-MCNC: 174 MG/DL (ref 70–99)
GLUCOSE BLDC GLUCOMTR-MCNC: 176 MG/DL (ref 70–99)
GLUCOSE BLDC GLUCOMTR-MCNC: 187 MG/DL (ref 70–99)
GLUCOSE BLDC GLUCOMTR-MCNC: 221 MG/DL (ref 70–99)
GLUCOSE CSF-MCNC: 98 MG/DL (ref 40–70)
GLUCOSE SERPL-MCNC: 178 MG/DL (ref 70–99)
GRAM STN SPEC: NORMAL
HCO3 BLD-SCNC: 28 MMOL/L (ref 21–28)
HCO3 BLD-SCNC: 30 MMOL/L (ref 21–28)
HCT VFR BLD AUTO: 25.6 % (ref 40–53)
HGB BLD-MCNC: 8 G/DL (ref 13.3–17.7)
LYMPH ABN NFR CSF MANUAL: 12 %
MAGNESIUM SERPL-MCNC: 2.2 MG/DL (ref 1.6–2.3)
MAGNESIUM SERPL-MCNC: 2.5 MG/DL (ref 1.6–2.3)
MCH RBC QN AUTO: 30.3 PG (ref 26.5–33)
MCHC RBC AUTO-ENTMCNC: 31.3 G/DL (ref 31.5–36.5)
MCV RBC AUTO: 97 FL (ref 78–100)
MONOS+MACROS NFR CSF MANUAL: 34 %
NEUTROPHILS NFR CSF MANUAL: 53 %
O2/TOTAL GAS SETTING VFR VENT: 40 %
O2/TOTAL GAS SETTING VFR VENT: 40 %
OXYHGB MFR BLD: 100 % (ref 92–100)
OXYHGB MFR BLD: 97 % (ref 92–100)
PCO2 BLD: 35 MM HG (ref 35–45)
PCO2 BLD: 42 MM HG (ref 35–45)
PH BLD: 7.45 PH (ref 7.35–7.45)
PH BLD: 7.52 PH (ref 7.35–7.45)
PHOSPHATE SERPL-MCNC: 3.2 MG/DL (ref 2.5–4.5)
PLATELET # BLD AUTO: 852 10E9/L (ref 150–450)
PO2 BLD: 367 MM HG (ref 80–105)
PO2 BLD: 85 MM HG (ref 80–105)
POTASSIUM SERPL-SCNC: 3.5 MMOL/L (ref 3.4–5.3)
POTASSIUM SERPL-SCNC: 3.9 MMOL/L (ref 3.4–5.3)
PROCALCITONIN SERPL-MCNC: 0.28 NG/ML
PROT CSF-MCNC: 128 MG/DL (ref 15–60)
RBC # BLD AUTO: 2.64 10E12/L (ref 4.4–5.9)
RBC # CSF MANUAL: ABNORMAL /UL (ref 0–2)
SODIUM SERPL-SCNC: 145 MMOL/L (ref 133–144)
SPECIMEN EXP DATE BLD: NORMAL
SPECIMEN SOURCE: NORMAL
SPECIMEN SOURCE: NORMAL
TUBE # CSF: ABNORMAL #
VANCOMYCIN SERPL-MCNC: 11.7 MG/L
WBC # BLD AUTO: 29.7 10E9/L (ref 4–11)
WBC # CSF MANUAL: 52 /UL (ref 0–5)

## 2021-05-30 PROCEDURE — 250N000011 HC RX IP 250 OP 636: Performed by: NEUROLOGICAL SURGERY

## 2021-05-30 PROCEDURE — 87075 CULTR BACTERIA EXCEPT BLOOD: CPT | Performed by: STUDENT IN AN ORGANIZED HEALTH CARE EDUCATION/TRAINING PROGRAM

## 2021-05-30 PROCEDURE — 258N000003 HC RX IP 258 OP 636: Performed by: NEUROLOGICAL SURGERY

## 2021-05-30 PROCEDURE — 250N000013 HC RX MED GY IP 250 OP 250 PS 637: Performed by: STUDENT IN AN ORGANIZED HEALTH CARE EDUCATION/TRAINING PROGRAM

## 2021-05-30 PROCEDURE — 250N000009 HC RX 250: Performed by: STUDENT IN AN ORGANIZED HEALTH CARE EDUCATION/TRAINING PROGRAM

## 2021-05-30 PROCEDURE — 94640 AIRWAY INHALATION TREATMENT: CPT | Mod: 76

## 2021-05-30 PROCEDURE — 200N000002 HC R&B ICU UMMC

## 2021-05-30 PROCEDURE — 370N000003 HC ANESTHESIA WARD SERVICE

## 2021-05-30 PROCEDURE — 250N000011 HC RX IP 250 OP 636: Performed by: STUDENT IN AN ORGANIZED HEALTH CARE EDUCATION/TRAINING PROGRAM

## 2021-05-30 PROCEDURE — 84145 PROCALCITONIN (PCT): CPT | Performed by: STUDENT IN AN ORGANIZED HEALTH CARE EDUCATION/TRAINING PROGRAM

## 2021-05-30 PROCEDURE — 82805 BLOOD GASES W/O2 SATURATION: CPT | Performed by: NEUROLOGICAL SURGERY

## 2021-05-30 PROCEDURE — 87070 CULTURE OTHR SPECIMN AEROBIC: CPT | Performed by: STUDENT IN AN ORGANIZED HEALTH CARE EDUCATION/TRAINING PROGRAM

## 2021-05-30 PROCEDURE — 999N001017 HC STATISTIC GLUCOSE BY METER IP

## 2021-05-30 PROCEDURE — 86900 BLOOD TYPING SEROLOGIC ABO: CPT | Performed by: NEUROLOGICAL SURGERY

## 2021-05-30 PROCEDURE — 84100 ASSAY OF PHOSPHORUS: CPT | Performed by: STUDENT IN AN ORGANIZED HEALTH CARE EDUCATION/TRAINING PROGRAM

## 2021-05-30 PROCEDURE — 71045 X-RAY EXAM CHEST 1 VIEW: CPT | Mod: 26 | Performed by: RADIOLOGY

## 2021-05-30 PROCEDURE — 95711 VEEG 2-12 HR UNMONITORED: CPT

## 2021-05-30 PROCEDURE — 258N000003 HC RX IP 258 OP 636: Performed by: STUDENT IN AN ORGANIZED HEALTH CARE EDUCATION/TRAINING PROGRAM

## 2021-05-30 PROCEDURE — 250N000009 HC RX 250: Performed by: PSYCHIATRY & NEUROLOGY

## 2021-05-30 PROCEDURE — 999N000185 HC STATISTIC TRANSPORT TIME EA 15 MIN

## 2021-05-30 PROCEDURE — 80202 ASSAY OF VANCOMYCIN: CPT | Performed by: NEUROLOGICAL SURGERY

## 2021-05-30 PROCEDURE — 999N000065 XR CHEST PORT 1 VIEW

## 2021-05-30 PROCEDURE — 999N000076 HC STATISTIC ICP MONITORING

## 2021-05-30 PROCEDURE — 250N000013 HC RX MED GY IP 250 OP 250 PS 637: Performed by: NURSE PRACTITIONER

## 2021-05-30 PROCEDURE — 82945 GLUCOSE OTHER FLUID: CPT | Performed by: STUDENT IN AN ORGANIZED HEALTH CARE EDUCATION/TRAINING PROGRAM

## 2021-05-30 PROCEDURE — 83735 ASSAY OF MAGNESIUM: CPT | Performed by: STUDENT IN AN ORGANIZED HEALTH CARE EDUCATION/TRAINING PROGRAM

## 2021-05-30 PROCEDURE — 250N000013 HC RX MED GY IP 250 OP 250 PS 637: Performed by: NEUROLOGICAL SURGERY

## 2021-05-30 PROCEDURE — 95718 EEG PHYS/QHP 2-12 HR W/VEEG: CPT | Performed by: PSYCHIATRY & NEUROLOGY

## 2021-05-30 PROCEDURE — 250N000011 HC RX IP 250 OP 636

## 2021-05-30 PROCEDURE — 93886 INTRACRANIAL COMPLETE STUDY: CPT

## 2021-05-30 PROCEDURE — 94640 AIRWAY INHALATION TREATMENT: CPT

## 2021-05-30 PROCEDURE — 999N000157 HC STATISTIC RCP TIME EA 10 MIN

## 2021-05-30 PROCEDURE — 84132 ASSAY OF SERUM POTASSIUM: CPT | Performed by: NEUROLOGICAL SURGERY

## 2021-05-30 PROCEDURE — 99291 CRITICAL CARE FIRST HOUR: CPT | Mod: 25 | Performed by: PSYCHIATRY & NEUROLOGY

## 2021-05-30 PROCEDURE — 70450 CT HEAD/BRAIN W/O DYE: CPT

## 2021-05-30 PROCEDURE — 87015 SPECIMEN INFECT AGNT CONCNTJ: CPT | Performed by: STUDENT IN AN ORGANIZED HEALTH CARE EDUCATION/TRAINING PROGRAM

## 2021-05-30 PROCEDURE — 87205 SMEAR GRAM STAIN: CPT | Performed by: STUDENT IN AN ORGANIZED HEALTH CARE EDUCATION/TRAINING PROGRAM

## 2021-05-30 PROCEDURE — 86140 C-REACTIVE PROTEIN: CPT | Performed by: STUDENT IN AN ORGANIZED HEALTH CARE EDUCATION/TRAINING PROGRAM

## 2021-05-30 PROCEDURE — 84157 ASSAY OF PROTEIN OTHER: CPT | Performed by: STUDENT IN AN ORGANIZED HEALTH CARE EDUCATION/TRAINING PROGRAM

## 2021-05-30 PROCEDURE — 83735 ASSAY OF MAGNESIUM: CPT | Performed by: NEUROLOGICAL SURGERY

## 2021-05-30 PROCEDURE — 93886 INTRACRANIAL COMPLETE STUDY: CPT | Mod: 26 | Performed by: RADIOLOGY

## 2021-05-30 PROCEDURE — 85027 COMPLETE CBC AUTOMATED: CPT | Performed by: STUDENT IN AN ORGANIZED HEALTH CARE EDUCATION/TRAINING PROGRAM

## 2021-05-30 PROCEDURE — 94003 VENT MGMT INPAT SUBQ DAY: CPT

## 2021-05-30 PROCEDURE — 86850 RBC ANTIBODY SCREEN: CPT | Performed by: NEUROLOGICAL SURGERY

## 2021-05-30 PROCEDURE — 86901 BLOOD TYPING SEROLOGIC RH(D): CPT | Performed by: NEUROLOGICAL SURGERY

## 2021-05-30 PROCEDURE — 70450 CT HEAD/BRAIN W/O DYE: CPT | Mod: 26 | Performed by: RADIOLOGY

## 2021-05-30 PROCEDURE — 80048 BASIC METABOLIC PNL TOTAL CA: CPT | Performed by: STUDENT IN AN ORGANIZED HEALTH CARE EDUCATION/TRAINING PROGRAM

## 2021-05-30 PROCEDURE — 89051 BODY FLUID CELL COUNT: CPT | Performed by: STUDENT IN AN ORGANIZED HEALTH CARE EDUCATION/TRAINING PROGRAM

## 2021-05-30 PROCEDURE — 250N000011 HC RX IP 250 OP 636: Performed by: ANESTHESIOLOGY

## 2021-05-30 RX ORDER — FENTANYL CITRATE 50 UG/ML
200 INJECTION, SOLUTION INTRAMUSCULAR; INTRAVENOUS ONCE
Status: COMPLETED | OUTPATIENT
Start: 2021-05-30 | End: 2021-05-30

## 2021-05-30 RX ORDER — PROPOFOL 10 MG/ML
INJECTION, EMULSION INTRAVENOUS
Status: DISCONTINUED | OUTPATIENT
Start: 2021-05-30 | End: 2021-05-30

## 2021-05-30 RX ORDER — POTASSIUM CHLORIDE 20MEQ/15ML
20 LIQUID (ML) ORAL ONCE
Status: COMPLETED | OUTPATIENT
Start: 2021-05-30 | End: 2021-05-30

## 2021-05-30 RX ORDER — FENTANYL CITRATE 50 UG/ML
INJECTION, SOLUTION INTRAMUSCULAR; INTRAVENOUS
Status: COMPLETED
Start: 2021-05-30 | End: 2021-05-30

## 2021-05-30 RX ORDER — OXYCODONE HYDROCHLORIDE 5 MG/1
5 TABLET ORAL EVERY 4 HOURS PRN
Status: DISCONTINUED | OUTPATIENT
Start: 2021-05-30 | End: 2021-06-08

## 2021-05-30 RX ADMIN — ACETAMINOPHEN 650 MG: 325 TABLET, FILM COATED ORAL at 08:01

## 2021-05-30 RX ADMIN — INSULIN ASPART 2 UNITS: 100 INJECTION, SOLUTION INTRAVENOUS; SUBCUTANEOUS at 20:41

## 2021-05-30 RX ADMIN — CEFEPIME 2 G: 2 INJECTION, POWDER, FOR SOLUTION INTRAVENOUS at 01:40

## 2021-05-30 RX ADMIN — OXYCODONE HYDROCHLORIDE 5 MG: 5 TABLET ORAL at 22:32

## 2021-05-30 RX ADMIN — POTASSIUM CHLORIDE 20 MEQ: 20 SOLUTION ORAL at 18:05

## 2021-05-30 RX ADMIN — Medication 10 ML: at 11:39

## 2021-05-30 RX ADMIN — DOCUSATE SODIUM 50 MG AND SENNOSIDES 8.6 MG 1 TABLET: 8.6; 5 TABLET, FILM COATED ORAL at 08:03

## 2021-05-30 RX ADMIN — HEPARIN SODIUM 5000 UNITS: 5000 INJECTION, SOLUTION INTRAVENOUS; SUBCUTANEOUS at 00:23

## 2021-05-30 RX ADMIN — INSULIN ASPART 3 UNITS: 100 INJECTION, SOLUTION INTRAVENOUS; SUBCUTANEOUS at 08:55

## 2021-05-30 RX ADMIN — HEPARIN SODIUM 5000 UNITS: 5000 INJECTION, SOLUTION INTRAVENOUS; SUBCUTANEOUS at 15:25

## 2021-05-30 RX ADMIN — INSULIN ASPART 1 UNITS: 100 INJECTION, SOLUTION INTRAVENOUS; SUBCUTANEOUS at 00:23

## 2021-05-30 RX ADMIN — Medication 1 PACKET: at 13:09

## 2021-05-30 RX ADMIN — NIMODIPINE 60 MG: 30 SOLUTION ORAL at 03:27

## 2021-05-30 RX ADMIN — INSULIN ASPART 2 UNITS: 100 INJECTION, SOLUTION INTRAVENOUS; SUBCUTANEOUS at 03:34

## 2021-05-30 RX ADMIN — Medication 10 ML: at 19:56

## 2021-05-30 RX ADMIN — INSULIN ASPART 2 UNITS: 100 INJECTION, SOLUTION INTRAVENOUS; SUBCUTANEOUS at 16:00

## 2021-05-30 RX ADMIN — MILRINONE LACTATE IN DEXTROSE 1 MCG/KG/MIN: 200 INJECTION, SOLUTION INTRAVENOUS at 10:02

## 2021-05-30 RX ADMIN — CHLOROTHIAZIDE SODIUM 500 MG: 500 INJECTION, POWDER, LYOPHILIZED, FOR SOLUTION INTRAVENOUS at 10:05

## 2021-05-30 RX ADMIN — PROPOFOL 100 MG: 10 INJECTION, EMULSION INTRAVENOUS at 17:00

## 2021-05-30 RX ADMIN — IPRATROPIUM BROMIDE AND ALBUTEROL SULFATE 3 ML: 2.5; .5 SOLUTION RESPIRATORY (INHALATION) at 20:54

## 2021-05-30 RX ADMIN — Medication 10 ML: at 00:24

## 2021-05-30 RX ADMIN — Medication 10 ML: at 23:48

## 2021-05-30 RX ADMIN — Medication 1 PACKET: at 08:03

## 2021-05-30 RX ADMIN — INSULIN ASPART 1 UNITS: 100 INJECTION, SOLUTION INTRAVENOUS; SUBCUTANEOUS at 11:48

## 2021-05-30 RX ADMIN — VANCOMYCIN HYDROCHLORIDE 1750 MG: 10 INJECTION, POWDER, LYOPHILIZED, FOR SOLUTION INTRAVENOUS at 17:28

## 2021-05-30 RX ADMIN — OXYCODONE HYDROCHLORIDE 5 MG: 5 TABLET ORAL at 15:12

## 2021-05-30 RX ADMIN — POTASSIUM CHLORIDE 20 MEQ: 1.5 POWDER, FOR SOLUTION ORAL at 11:45

## 2021-05-30 RX ADMIN — IPRATROPIUM BROMIDE AND ALBUTEROL SULFATE 3 ML: 2.5; .5 SOLUTION RESPIRATORY (INHALATION) at 08:25

## 2021-05-30 RX ADMIN — HEPARIN SODIUM 5000 UNITS: 5000 INJECTION, SOLUTION INTRAVENOUS; SUBCUTANEOUS at 08:02

## 2021-05-30 RX ADMIN — CEFEPIME 2 G: 2 INJECTION, POWDER, FOR SOLUTION INTRAVENOUS at 10:10

## 2021-05-30 RX ADMIN — DEXMEDETOMIDINE 0.4 MCG/KG/HR: 100 INJECTION, SOLUTION, CONCENTRATE INTRAVENOUS at 22:37

## 2021-05-30 RX ADMIN — NIMODIPINE 60 MG: 30 SOLUTION ORAL at 08:55

## 2021-05-30 RX ADMIN — Medication 10 ML: at 03:28

## 2021-05-30 RX ADMIN — MILRINONE LACTATE IN DEXTROSE 1 MCG/KG/MIN: 200 INJECTION, SOLUTION INTRAVENOUS at 19:54

## 2021-05-30 RX ADMIN — FAMOTIDINE 20 MG: 20 TABLET ORAL at 08:02

## 2021-05-30 RX ADMIN — VANCOMYCIN HYDROCHLORIDE 1500 MG: 10 INJECTION, POWDER, LYOPHILIZED, FOR SOLUTION INTRAVENOUS at 05:29

## 2021-05-30 RX ADMIN — CHLOROTHIAZIDE SODIUM 500 MG: 500 INJECTION, POWDER, LYOPHILIZED, FOR SOLUTION INTRAVENOUS at 19:54

## 2021-05-30 RX ADMIN — IPRATROPIUM BROMIDE AND ALBUTEROL SULFATE 3 ML: 2.5; .5 SOLUTION RESPIRATORY (INHALATION) at 14:46

## 2021-05-30 RX ADMIN — CEFEPIME 2 G: 2 INJECTION, POWDER, FOR SOLUTION INTRAVENOUS at 17:34

## 2021-05-30 RX ADMIN — NIMODIPINE 60 MG: 30 SOLUTION ORAL at 19:55

## 2021-05-30 RX ADMIN — Medication 1 PACKET: at 19:55

## 2021-05-30 RX ADMIN — Medication 10 ML: at 15:13

## 2021-05-30 RX ADMIN — ACETAMINOPHEN 650 MG: 325 TABLET, FILM COATED ORAL at 04:50

## 2021-05-30 RX ADMIN — Medication 0.03 MCG/KG/MIN: at 02:01

## 2021-05-30 RX ADMIN — IPRATROPIUM BROMIDE AND ALBUTEROL SULFATE 3 ML: 2.5; .5 SOLUTION RESPIRATORY (INHALATION) at 03:19

## 2021-05-30 RX ADMIN — POLYETHYLENE GLYCOL 3350 17 G: 17 POWDER, FOR SOLUTION ORAL at 08:02

## 2021-05-30 RX ADMIN — POTASSIUM CHLORIDE 20 MEQ: 1.5 POWDER, FOR SOLUTION ORAL at 20:43

## 2021-05-30 RX ADMIN — MILRINONE LACTATE IN DEXTROSE 1 MCG/KG/MIN: 200 INJECTION, SOLUTION INTRAVENOUS at 23:44

## 2021-05-30 RX ADMIN — DOCUSATE SODIUM 50 MG AND SENNOSIDES 8.6 MG 1 TABLET: 8.6; 5 TABLET, FILM COATED ORAL at 19:55

## 2021-05-30 RX ADMIN — FENTANYL CITRATE 200 MCG: 50 INJECTION, SOLUTION INTRAMUSCULAR; INTRAVENOUS at 16:45

## 2021-05-30 RX ADMIN — DEXMEDETOMIDINE 0.5 MCG/KG/HR: 100 INJECTION, SOLUTION, CONCENTRATE INTRAVENOUS at 01:45

## 2021-05-30 RX ADMIN — PHENYLEPHRINE HYDROCHLORIDE 100 MCG: 10 INJECTION INTRAVENOUS at 17:02

## 2021-05-30 RX ADMIN — NOREPINEPHRINE BITARTRATE 12.8 MCG: 1 INJECTION, SOLUTION, CONCENTRATE INTRAVENOUS at 17:07

## 2021-05-30 RX ADMIN — NIMODIPINE 60 MG: 30 SOLUTION ORAL at 15:12

## 2021-05-30 RX ADMIN — MILRINONE LACTATE IN DEXTROSE 1 MCG/KG/MIN: 200 INJECTION, SOLUTION INTRAVENOUS at 05:29

## 2021-05-30 RX ADMIN — NIMODIPINE 60 MG: 30 SOLUTION ORAL at 00:24

## 2021-05-30 RX ADMIN — NOREPINEPHRINE BITARTRATE 6.4 MCG: 1 INJECTION, SOLUTION, CONCENTRATE INTRAVENOUS at 17:04

## 2021-05-30 RX ADMIN — MILRINONE LACTATE IN DEXTROSE 1 MCG/KG/MIN: 200 INJECTION, SOLUTION INTRAVENOUS at 01:45

## 2021-05-30 RX ADMIN — Medication 10 ML: at 08:03

## 2021-05-30 RX ADMIN — MILRINONE LACTATE IN DEXTROSE 1 MCG/KG/MIN: 200 INJECTION, SOLUTION INTRAVENOUS at 15:12

## 2021-05-30 RX ADMIN — ACETAMINOPHEN 650 MG: 325 TABLET, FILM COATED ORAL at 22:32

## 2021-05-30 RX ADMIN — ACETAMINOPHEN 650 MG: 325 TABLET, FILM COATED ORAL at 15:11

## 2021-05-30 RX ADMIN — HYDRALAZINE HYDROCHLORIDE 20 MG: 20 INJECTION INTRAMUSCULAR; INTRAVENOUS at 15:20

## 2021-05-30 RX ADMIN — NIMODIPINE 30 MG: 30 SOLUTION ORAL at 11:39

## 2021-05-30 RX ADMIN — CHLOROTHIAZIDE SODIUM 500 MG: 500 INJECTION, POWDER, LYOPHILIZED, FOR SOLUTION INTRAVENOUS at 13:05

## 2021-05-30 RX ADMIN — FAMOTIDINE 20 MG: 20 TABLET ORAL at 19:55

## 2021-05-30 RX ADMIN — NIMODIPINE 60 MG: 30 SOLUTION ORAL at 23:48

## 2021-05-30 ASSESSMENT — ENCOUNTER SYMPTOMS: SEIZURES: 1

## 2021-05-30 ASSESSMENT — ACTIVITIES OF DAILY LIVING (ADL)
ADLS_ACUITY_SCORE: 26

## 2021-05-30 NOTE — PROGRESS NOTES
ETT exchange by Anesthesia at the bedside from size 7.5cm to 8.0cm,  secured 30cm at the lip with commercial ETT smith. No complications occur during the procedure.    RT will continue to follow up.

## 2021-05-30 NOTE — PROGRESS NOTES
Neurosurgery progress note    S: no acute events overnight; exam stable overnight    O:  Temp:  [97.6  F (36.4  C)-99.1  F (37.3  C)] 99.1  F (37.3  C)  Pulse:  [64-92] 73  Resp:  [11-31] 22  MAP:  [63 mmHg-104 mmHg] 71 mmHg  Arterial Line BP: ()/(50-78) 117/56  FiO2 (%):  [40 %] 40 %  SpO2:  [95 %-100 %] 98 %    Exam:  Incision: c/d/i, drain and EVD in place  Intubated   Open eyes to stimulation   Shows thumbs up b/l  Wiggles toes LLE  W/d RLE  Overall more brisk movements L hemibody      Imaging:  No new imaging      ASSESSMENT: 70 year old man presented with AMS found to have aSAH with right supraclinoid aneurysm. mFS4 and HH5. GCS 5. S/p EVD placement, DCA,s/p aneurysm clipping on 5/16.     The following conditions are also present, complicating the patient's current management, as described in the Assessment and Plan:   mechanical ventilation on day of admission, intracerebral hematoma, brain compression, acute respiratory failure, coma, based on current GCS of 5 and cerebral aneurysm rupture with hunt sims scale 5 modified lowery 4     Hypokalemia   Severe malnutrition in the context of acute on chronic illness  Respiratory failure  Pneumonia     RECOMMENDATIONS:  - EVD: 10 open  - CTA and CTP today  - Q1hr neuro exam  - SBP<200  - HOB > 30 degrees  - wean sedation as possible  - follow-up clx  -- Cefepime/Vancomycin for PNA  - Continuous cardiac monitoring while in ICU   - Nimodipine  - daily TCDs, low threshold for CTA and CTP  - Glucose < 180  - Continue glucose checks  - Platelets > 100,000  - INR < 1.5  - Hemoglobin > 8  - DVT: SCDs while in bed  - Disposition: 4A  - PT/OT consulted        Samuel Nolasco MD, PhD  Neurosurgery Resident PGY-2    Please contact neurosurgery resident on call with questions.    Dial * * [phone], enter [phone] when prompted.

## 2021-05-30 NOTE — ANESTHESIA CARE TRANSFER NOTE
Patient: Dayron Neri    * No procedures listed *    Diagnosis: * No pre-op diagnosis entered *  Diagnosis Additional Information: No value filed.    Anesthesia Type:   No value filed.     Note:    Oropharynx: ventilatory support  Level of Consciousness: unresponsive and drowsy      Independent Airway: airway patency not satisfactory and stable  Dentition: dentition unchanged  Vital Signs Stable: post-procedure vital signs reviewed and stable  Report to RN Given: handoff report given  Patient transferred to: ICU  Comments: ETT exchange due to cuff leak. Uncomplicated.   Sign out given.   Chest XR and additional cares by ICU team.   ICU Handoff: Call for PAUSE to initiate/utilize ICU HANDOFF, Identified Patient, Identified Responsible Provider, Reviewed the Pertinent Medical History, Discussed Surgical Course, Reviewed Intra-OP Anesthesia Management and Issues during Anesthesia, Set Expectations for Post Procedure Period and Allowed Opportunity for Questions and Acknowledgement of Understanding      Vitals: (Last set prior to Anesthesia Care Transfer)    Electronically Signed By: Gill Aparicio MD  May 30, 2021  5:51 PM

## 2021-05-30 NOTE — PLAN OF CARE
Problem: Airway Clearance Ineffective (Pulmonary Impairment)  Goal: Effective Airway Clearance  Outcome: Declining   D/I: Patient on unit 4A Surgical/Neuro ICU post SAH, crani, vasospasms, and mechanical ventilation support.  Neuro-Opens eyes spontaneous, Pupils react, no tracking. Breaths above vent, fair cough with suctioning, +gag.  Moves Left arm spontaneously off bed.  Minimal mvmt R arm, LE.  EVD at 10 above EAM and draining ranjit CSF.  Waveform negative throughout day.  MD aware and removed air bubble in transducer.  ICP currently 3. Maintaining CPP 70 with Levophed as needed.   CV-Labile BP depending on sedation level.  On and off Levo to keep MAP >70.   Pulm- Frequent suctioning/Copious creamy yellow sputum. Tolerated PS 7/7 x 6 hours ([id number]).  Agitated with tachypnea and large cuff leak in ETT at 1630.  MD aware and ETT changed to 8.0 per anesthesia without difficulty.   GI-Large liquid stool x2. Tolerating TF.  Free water at 50cc/2hr.       -Condom cath.  Good UO. Diruril given x3.     Gtts-Dex currently at 0.4, Norepi off, Milrinone at 1mcg/kg/mn.   Skin-Intact.   Pain-Oxycodone given x1 agitation.    See flow sheets for further interventions and assessments.  A: Critically stable.  Labile BP.     P: Continue to monitor pt closely. Notify MD of significant changes.

## 2021-05-30 NOTE — ANESTHESIA PROCEDURE NOTES
Airway       Patient location during procedure: ICU       Procedure Start/Stop Times: 5/30/2021 5:05 PM  Staff -        Anesthesiologist:  Jhon Darby MD       Resident/Fellow: Gill Johnson MD       Performed By: with residents       Procedure performed by resident/fellow/CRNA in presence of a teaching physician.    Consent for Airway        Urgency: emergent       Consent: The procedure was performed in an emergent situation.  Report Obtained from Primary Care Team       History regarding most recent potassium obtained: Yes       History regarding presence/absence of renal failure obtained:Yes       History regarding stroke/CVA obtained:Yes       History regarding presence/absence of NM disorder: YesIndications and Patient Condition       Indications for airway management: airway protection       Mallampati: Not Assessed     Induction type:intravenous       Mask difficulty assessment: 0 - not attempted    Final Airway Details       Final airway type: endotracheal airway       Successful airway: ETT - single  Endotracheal Airway Details        ETT size (mm): 8.0       Cuffed: yes       Successful intubation technique: blind       Adjucts: exchange catheter       Position: Center       Measured from: gums/teeth       Secured at (cm): 30       Bite block used: Molar    Post intubation assessment        ETT secured, Vent settings by primary/ICU team, Primary/ICU team to review CXR, Sedation to be ordered by primary/ICU team and No apparent complications       Placement verified by: capnometry, equal breath sounds and chest rise        Number of attempts at approach: 1       Number of other approaches attempted: 0       Secured with: commercial tube smith       Dentition: Intact and Unchanged    Medication(s) Administered   propofol (DIPRIVAN) injection 10 mg/mL vial, 100 mg  Medication Administration Time: 5/30/2021 5:00 PM    Additional Comments       ETT exchanged requested by UCU team due to  cuff leak. On initial attemp at DL/ VL with MAC 4 blade, unable to visualize any structures due to copious secretions clouding CMAC View. Problem persisted in spite of suctioning oral cavity and ETT.   ETT blindly exchanged over exchange catheter with no complications.  BL breath sounds verified with ETT at 30cm to gums (where prior ETT was secured as well).   Chest XR to be obtained and reviewed by ICU team.   Patient received a total 100mg of propofol, and 200mg of Fentanyl. No NMB given.

## 2021-05-30 NOTE — ANESTHESIA PREPROCEDURE EVALUATION
Anesthesia Pre-Procedure Evaluation    Patient: Dayron Neri   MRN: [id number] : 1950        Preoperative Diagnosis: * No pre-op diagnosis entered *   Procedure : * No procedures listed *     Past Medical History:   Diagnosis Date     Displacement of lumbar intervertebral disc without myelopathy     Lumbar disc rupture, no surgery      Past Surgical History:   Procedure Laterality Date     CRANIOTOMY, EVACUATE HEMATOMA SUBDURAL, COMBINED Right 5/15/2021    Procedure: CRANIOTOMY, FOR SUBDURAL HEMATOMA EVACUATION;  Surgeon: Jamin Martinez MD;  Location: [facility] OR     CRANIOTOMY, REPAIR ANEURYSM, COMBINED Right 5/15/2021    Procedure: CRANIOTOMY, FOR ANEURYSM REPAIR;  Surgeon: Jamin Martinez MD;  Location: [facility] OR     NO HISTORY OF SURGERY        No Known Allergies   Social History     Tobacco Use     Smoking status: Never Smoker     Smokeless tobacco: Never Used   Substance Use Topics     Alcohol use: Yes     Comment: moderate use      Wt Readings from Last 1 Encounters:   21 70.6 kg (155 lb 10.3 oz)        Anesthesia Evaluation            ROS/MED HX  ENT/Pulmonary:       Neurologic: Comment: SAH due to ICA aneurysm distal to PComm, midline shift, elevated ICP with blown pupil s/p mannitol, hyperventilation, EVD placement    (+) seizures, last seizure: 2021, features: En route to hospital after SAH, CVA, date: 05/15/2021,     Cardiovascular:     (+) hypertension-----    METS/Exercise Tolerance:     Hematologic:       Musculoskeletal:       GI/Hepatic:       Renal/Genitourinary:       Endo:       Psychiatric/Substance Use:       Infectious Disease:       Malignancy:       Other:               OUTSIDE LABS:  CBC:   Lab Results   Component Value Date    WBC 29.7 (H) 2021    WBC 24.0 (H) 2021    HGB 8.0 (L) 2021    HGB 8.2 (L) 2021    HCT 25.6 (L) 2021    HCT 26.0 (L) 2021     (H) 2021     (H) 2021     BMP:   Lab Results    Component Value Date     (H) 05/30/2021     05/29/2021    POTASSIUM 3.5 05/30/2021    POTASSIUM 3.9 05/30/2021    CHLORIDE 112 (H) 05/30/2021    CHLORIDE 109 05/29/2021    CO2 29 05/30/2021    CO2 29 05/29/2021    BUN 27 05/30/2021    BUN 23 05/29/2021    CR 0.55 (L) 05/30/2021    CR 0.52 (L) 05/29/2021     (H) 05/30/2021     (H) 05/29/2021     COAGS:   Lab Results   Component Value Date    PTT 30 05/15/2021    INR 1.20 (H) 05/15/2021    FIBR 417 05/15/2021     POC:   Lab Results   Component Value Date     (H) 05/30/2021     HEPATIC:   Lab Results   Component Value Date    ALBUMIN 2.1 (L) 05/26/2021    PROTTOTAL 7.3 05/26/2021    ALT 89 (H) 05/26/2021    AST 31 05/26/2021    ALKPHOS 166 (H) 05/26/2021    BILITOTAL 0.3 05/26/2021     OTHER:   Lab Results   Component Value Date    PH 7.52 (H) 05/30/2021    LACT 1.2 05/26/2021    A1C 5.5 05/15/2021    RAGINI 8.8 05/30/2021    PHOS 3.2 05/30/2021    MAG 2.5 (H) 05/30/2021    TSH 5.36 (H) 05/15/2021    T4 1.23 05/15/2021    .0 (H) 05/30/2021     (H) 05/25/2021       Anesthesia Plan    ASA Status:  3     - Procedure: Procedure only, no anesthetic delivered                    Consents            Postoperative Care            Comments:    Patient currently intubated in ICU.   ETT exchanged performed due to cuff leak.   No complications.             Gill Aparicio MD

## 2021-05-30 NOTE — PLAN OF CARE
Major Shift Events: PERRL, pupils 3mm. Sedated, pt spontaneously moving LUE and minimally LLE, withdrawing on right in uppers and lowers. EVD @ 10 above EAM, ICP 2-18, ouput 0-10 ml/hr, output more clear/red. SR-ST, hypotension with levophed started @0200 to achieve CPP goals. CMV settings, large amounts of thick creamy secretions, RT advanced ET tube back to 30 cm. Milrinone and levo infusing. Precedex @ 0.5 and fentanyl gtt stopped @ 0600. AM head CT and chest XR overnight.    Plan: Monitor BP to maintain CPP goals and monitor ICPs.  For vital signs and complete assessments, please see documentation flowsheets.

## 2021-05-30 NOTE — PROGRESS NOTES
"Neuroscience Intensive Care Progress Note  05/30/2021    Problem List  1.  Aneurysmal subarachnoid hemorrhage, HH 5, MF 4, bleed day 5/15  2.  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  Hydrocephalus status post EVD 5/15  4.  Acute Bilateral Hemispheric Infarct  5. Delayed Cerebral Ischemia  6.  Hypertension  7.  Acute hypoxic respiratory failure  8.  Hypernatremia  9.  Hyperchloremia  12. Leukocytosis  13. Normocytic anemia  14. Probable Ventriculitis     AE  - NAEO    Current Medications:    ceFEPIme (MAXIPIME) IV  2 g Intravenous Q8H     doxazosin  1 mg Oral or Feeding Tube Daily     famotidine  20 mg Oral or Feeding Tube BID     heparin ANTICOAGULANT  5,000 Units Subcutaneous Q8H     insulin aspart  1-12 Units Subcutaneous Q4H     ipratropium - albuterol 0.5 mg/2.5 mg/3 mL  3 mL Nebulization Q6H     niMODipine  60 mg Oral or Feeding Tube Q4H MARAH    And     sodium chloride 0.9 %  10 mL Oral or Feeding Tube Q4H MARAH     polyethylene glycol  17 g Oral or Feeding Tube Daily     potassium chloride  20 mEq Oral or Feeding Tube BID     protein modular  1 packet Per Feeding Tube TID     senna-docusate  1 tablet Oral or Feeding Tube BID    Or     senna-docusate  2 tablet Oral or Feeding Tube BID     vancomycin (VANCOCIN) IV  1,500 mg Intravenous Q12H       PRN Medications:  acetaminophen, bisacodyl, glucose **OR** dextrose **OR** glucagon, fentaNYL, hydrALAZINE, labetalol, metoclopramide **OR** metoclopramide, naloxone **OR** naloxone **OR** naloxone **OR** naloxone, ondansetron **OR** ondansetron, prochlorperazine **OR** prochlorperazine **OR** prochlorperazine, QUEtiapine    Infusions:    dexmedetomidine 0.5 mcg/kg/hr (05/30/21 0700)     milrinone 1 mcg/kg/min (05/30/21 0700)     norepinephrine Stopped (05/30/21 0759)       No Known Allergies    Physical Examination:  Vitals: /83   Pulse 71   Temp 98.2  F (36.8  C) (Axillary)   Resp 22   Ht 1.803 m (5' 11\")   Wt 70.6 kg (155 lb 10.3 oz)   " SpO2 98%   BMI 21.71 kg/m    General: Adult male patient, lying in bed, NAD  HEENT: Normocephalic/Atraumatic, no icterus, Oral cavity/Oropharynx pink and moist  Cardiac: RRR  Chest: No SOB, pattern regular, unlabored, expansion symmetric, no retractions or use of accessory muscles, assisted mechanically   Abdomen: Soft, bowel sounds present  Extremities: Warm, no edema  Skin: No rash or lesion   Psych: Restless, agitation   Neuro:  Mental status: Sedated, intubated, not tracking, not following commands,  Cranial nerves: PERRL, conjugate gaze, pupils 3mm round and reactive, cough and gag intact with deep suctioning.   Motor: Normal bulk and tone. No abnormal movements. antigravity in left arm and leg. 0/5 RUE/RLE  Sensory: Withdraws to noxious stimuli in left upper and lower  Coordination: deferred due to acuity  Gait: deferred due to acuity      Imaging:  I personally reviewed on labs and imaging in EMR.    Yesterday:  1. Repeat CT/MRI shows completed infarct in bilateral OLIVIER territory infarct.   2. TCDs shows bilateral OLIVIER with elevated velocities  3. Angiogram revealed mild vasospasm and IA verapamil given       Plan today in brief:  1. Hold precedex; continue to avoid adding fentanyl drip  2. TCDs with L olivier highest 107 with stable exam;   3. Stop vEEG  4. Continue milrenone, levophed for MAPs > 70; CPP > 60  5. Diuresis with hydrochlorothiazide 500mg    Assessment/Plan  #Subarachnoid hemorrhage (MF4, HH5), aneurysmal; PBD14 HH5, mF4; POD14 R PCOM clipping;  - Neuro checks q2h  - SBP < 200  - Nimodipine 60 mg q4h  - Daily TCDs yesterday L OLIVIER highest 107  - HOB > 30 degrees;   - Stop vEEG  - Hold sedation    #Bilateral Delayed Cerebral Ischemia, likely from vasospasm   - angio yesterday with mild vasospasm, s/p b/l ICA verapamil  - CTA from yesterday with diffuse b/l anterior and posterior circulation vasospasm; now with milrenone and norepinephrine  - milrenone 1mcg/kg/min  - levophed currently at 0.3; MAPs >  70; CPP > 60    #IVH with subsequent hydrocephalus   #Brain compression with midline shift R>L  - EVD placed 5/15  - EVD remains at 10, ICP 0-10, drained 148 yesterday, 60cc since midnight;      #Agitation  - sedation using precedex 0.5, fentanyl drip off currently  - Currently sedation being held will continue to hold for now    #Analgesics  - Oxycodone 5q4 PRN; stop fentanyl     CV:  #HTN  #ECHO completed on 5/17 + 5/27; EF 60-65%  #Diffuse vasospasm found on CT-A  - milerenone 1mcg/kg/min  - levophed currently at 0.03 for MAPs > 70; CPP > 60  - SBP < 200 (100-140s)  - Holding PTA Lisinopril  - Hydralazine and Labetalol PRN  -  5/24     Pulm:  #Acute hypoxic respiratory failure  #mixed acid base disorder   #?compensated metabolic alkalosis from contraction alkalosis with respiratory compensation  Re-intubated 5/19  - CXR overnight with correct ETT positioning. - Duonebs q6   - Currently on minimal vent settings  - WIll diurese today with 500 hydrochlorothiazide  - repeat CXR for tomorrow AM      Ventilation Mode: CMV/AC  (Continuous Mandatory Ventilation/ Assist Control)  FiO2 (%): 40 %  Rate Set (breaths/minute): 14 breaths/min  Tidal Volume Set (mL): 450 mL  PEEP (cm H2O): 7 cmH2O  Pressure Support (cm H2O): 5 cmH2O  Oxygen Concentration (%): 40 %  Resp: 26       GI/Nutrition:  #Severe malnutrition in the context of acute on chronic illness  - Protein modular 1 Pkt  - Multivitamins w/minerals  - Diet: TF's @ goal (60)  - NG   - resume senna   - cdiff negative.     Renal: I/o: Net +1.4L yesterday (2.4LUOP out; 4L in) ;  +12L since admission;   - FWF 50q2;  -500 hydrochlorothiazide today     #Urinary retention  - Doxazosin 1 mg daily   - IV Fluids: None  - BMP daily  - Electrolyte replacement protocol in place     Endo:  #Hyperglycemia   - Hgb A1c 5.5  - Follow glucose levels     Heme:  #Leukocytosis, improving  #Normocyctic anemia  - CBC daily  - UE/LE duplex negative     ID:  #febrile overnight; tmax  100.2   #WBC 24 from 30.3 from 23; procal 0.14 today  #Aspiration pneumonia  #Probable Ventriculitis   #NGTD; gm stain with GNRs; 5/22 sputum with moderate growth pseudomonas, klebsiella which is pansensitive  - 5/28: sputum with pseudomonas aeruginosa, csf gram stain/cx NGTD  -5/25: repeat panculture with heavy growth pseudomonas, previously pansensitive.   - 5/19 Sputum culture with heavy growth Klebsiella oxytoca; Heavy growth Staphylococcus epidermidis; Heavy growth beta hemolytic Streptococcus constellatus   - Ceftriaxone (5/21-5/23) was switched to Zosyn (5/23-5/28) switch to cefepime and vancomycin due to elevated WBC and fever  - will continue vanc/cefepime today    PPX:  DVT Prophylaxis  - SCDs in place  - Heparin subcutaneous    GI Prophylaxis  - Famotidine 20 mg BID      Lines/Tubes/Drains:  - PIV x 1  - L subclavian CVC/TL 5/15  - R arterial line  - NG  - EVD  - Continue to condom catheter     Code Status: Full code     Dispo: ICU    The patient was seen and discussed with the attending, Dr. Starr.    David Sands MD  Neurosurgery Resident PGY-1

## 2021-05-30 NOTE — PHARMACY-VANCOMYCIN DOSING SERVICE
Pharmacy Vancomycin Note  Date of Service May 30, 2021  Patient's  1950   70 year old, male    Indication: Ventriculitis  Day of Therapy: -  Current vancomycin regimen:  1500 mg IV q12h  Current vancomycin monitoring method: Trough (Method 2 = manual dose calculation)  Current vancomycin therapeutic monitoring goal: 15-20 mg/L    Current estimated CrCl = Estimated Creatinine Clearance: 128.4 mL/min (A) (based on SCr of 0.55 mg/dL (L)).    Creatinine for last 3 days  2021:  4:00 AM Creatinine 0.56 mg/dL  2021:  4:33 AM Creatinine 0.52 mg/dL  2021:  3:51 AM Creatinine 0.55 mg/dL    Recent Vancomycin Levels (past 3 days)  2021:  4:53 AM Vancomycin Level 11.7 mg/L    Vancomycin IV Administrations (past 72 hours)                   vancomycin 1500 mg in 0.9% NaCl 250 ml intermittent infusion 1,500 mg (mg) 1,500 mg New Bag 21 0529     1,500 mg New Bag 21 1735     1,500 mg New Bag  0813    vancomycin (VANCOCIN) 1,750 mg in sodium chloride 0.9 % 500 mL intermittent infusion (mg) 1,750 mg New Bag 21 1814                Nephrotoxins and other renal medications (From now, onward)    Start     Dose/Rate Route Frequency Ordered Stop    21 1800  vancomycin (VANCOCIN) 1,750 mg in sodium chloride 0.9 % 500 mL intermittent infusion      1,750 mg  over 120 Minutes Intravenous EVERY 12 HOURS 21 1600      21 1700  norepinephrine (LEVOPHED) 16 mg in  mL infusion MAX CONC CENTRAL LINE      0.01-0.6 mcg/kg/min × 69.8 kg (Dosing Weight)  0.7-39.3 mL/hr  Intravenous CONTINUOUS 21 1643               Contrast Orders - past 72 hours (72h ago, onward)    Start     Dose/Rate Route Frequency Ordered Stop    21 1130  iopamidol (ISOVUE-370) solution 115 mL      115 mL Intravenous ONCE 21 1114 21 1230    21 1800  gadobutrol (GADAVIST) injection 7.5 mL      7.5 mL Intravenous ONCE 21 1736 21 1736    21 1000  iodixanol (VISIPAQUE  320) injection 150 mL      150 mL Intravenous ONCE 05/28/21 0954 05/28/21 1054          Interpretation of levels and current regimen:  Vancomycin level is reflective of subtherapeutic level    Has serum creatinine changed greater than 50% in last 72 hours: No    Urine output:  good urine output    Renal Function: Stable    Plan:  1. Increase Dose to IV vancomycin 1750 mg Q12H  2. Vancomycin monitoring method: Trough (Method 2 = manual dose calculation)  3. Vancomycin therapeutic monitoring goal: 15-20 mg/L  4. Pharmacy will check vancomycin levels as appropriate in 1-3 Days.  5. Serum creatinine levels will be ordered daily for the first week of therapy and at least twice weekly for subsequent weeks.    Donal Martin RP

## 2021-05-30 NOTE — PROGRESS NOTES
Neurosurgery progress note    S: CTA showed bilateral BAYLEE and MCA vasospams, milirinone started, overnight required fentanyl for tachycardia and tachypnea     O:  Temp:  [98  F (36.7  C)-100.2  F (37.9  C)] 99.7  F (37.6  C)  Pulse:  [] 88  Resp:  [12-42] 22  MAP:  [67 mmHg-164 mmHg] 81 mmHg  Arterial Line BP: (118-234)/() 118/57  FiO2 (%):  [45 %-60 %] 50 %  SpO2:  [91 %-99 %] 94 %    Exam:  Incision: c/d/i, drain and EVD in place  Intubated   Open eyes to stimulation   Localize LUE, ? Follow commands by wiggle toes LLE  WD RUE and RLE    CTH 1. Evolving multifocal infarcts in the right anterior frontal, high  parietal, and temporal lobes as well as the left frontoparietal  region. Sulcal effacement, crowding of the basal cisterns, and  right-to-left midline shift, similar to prior.  2. Scattered subarachnoid hemorrhage, layering intraventricular  hemorrhage, and extra-axial fluid collection along the craniotomy  defect and anterior falx, similar to prior.  3. Stable postoperative changes of right frontoparietal craniotomy and  clipped right PCOM aneurysm with left frontal approach ventriculostomy  catheter terminating in the right lateral ventricle near the foramen  of Monro. Stable ventricular configuration.      ASSESSMENT: 70 year old man presented with AMS found to have aSAH with right supraclinoid aneurysm. mFS4 and HH5. GCS 5. S/p EVD placement, DCA,s/p aneurysm clipping on 5/16.     The following conditions are also present, complicating the patient's current management, as described in the Assessment and Plan:   mechanical ventilation on day of admission, intracerebral hematoma, brain compression, acute respiratory failure, coma, based on current GCS of 5 and cerebral aneurysm rupture with hunt sims scale 5 modified lowery 4     Hypokalemia   Severe malnutrition in the context of acute on chronic illness  Respiratory failure  Pneumonia     RECOMMENDATIONS:  - EVD: 10 open  - Q1hr neuro exam  -  SBP<200  - HOB > 30 degrees  - wean sedation as possible  - follow-up clx  -- zosyn for PNA  - Continuous cardiac monitoring while in ICU   - Nimodipine  - daily TCDs, low threshold for CTA and CTP  - Glucose < 180  - Continue glucose checks  - Platelets > 100,000  - INR < 1.5  - Hemoglobin > 8  - DVT: SCDs while in bed  - Disposition: 4A  - PT/OT consulted        Adrian Paredes MD  Neurosurgery Resident PGY-3    Please contact neurosurgery resident on call with questions.    Dial * * [phone], enter [phone] when prompted.    I have reviewed the history above and agree with the resident's assessment and plan.  RAYNA Maurer MD

## 2021-05-31 ENCOUNTER — APPOINTMENT (OUTPATIENT)
Dept: ULTRASOUND IMAGING | Facility: CLINIC | Age: 71
DRG: 003 | End: 2021-05-31
Attending: STUDENT IN AN ORGANIZED HEALTH CARE EDUCATION/TRAINING PROGRAM
Payer: COMMERCIAL

## 2021-05-31 ENCOUNTER — APPOINTMENT (OUTPATIENT)
Dept: GENERAL RADIOLOGY | Facility: CLINIC | Age: 71
DRG: 003 | End: 2021-05-31
Attending: NEUROLOGICAL SURGERY
Payer: COMMERCIAL

## 2021-05-31 ENCOUNTER — APPOINTMENT (OUTPATIENT)
Dept: CT IMAGING | Facility: CLINIC | Age: 71
DRG: 003 | End: 2021-05-31
Attending: STUDENT IN AN ORGANIZED HEALTH CARE EDUCATION/TRAINING PROGRAM
Payer: COMMERCIAL

## 2021-05-31 ENCOUNTER — RECORDS - HEALTHEAST (OUTPATIENT)
Dept: ADMINISTRATIVE | Facility: CLINIC | Age: 71
End: 2021-05-31

## 2021-05-31 LAB
ANION GAP SERPL CALCULATED.3IONS-SCNC: 7 MMOL/L (ref 3–14)
BACTERIA SPEC CULT: ABNORMAL
BACTERIA SPEC CULT: NO GROWTH
BACTERIA SPEC CULT: NO GROWTH
BASE EXCESS BLDA CALC-SCNC: 4.5 MMOL/L
BUN SERPL-MCNC: 28 MG/DL (ref 7–30)
CALCIUM SERPL-MCNC: 9.3 MG/DL (ref 8.5–10.1)
CHLORIDE SERPL-SCNC: 110 MMOL/L (ref 94–109)
CO2 SERPL-SCNC: 26 MMOL/L (ref 20–32)
CREAT SERPL-MCNC: 0.55 MG/DL (ref 0.66–1.25)
ERYTHROCYTE [DISTWIDTH] IN BLOOD BY AUTOMATED COUNT: 13.4 % (ref 10–15)
GFR SERPL CREATININE-BSD FRML MDRD: >90 ML/MIN/{1.73_M2}
GLUCOSE BLDC GLUCOMTR-MCNC: 140 MG/DL (ref 70–99)
GLUCOSE BLDC GLUCOMTR-MCNC: 147 MG/DL (ref 70–99)
GLUCOSE BLDC GLUCOMTR-MCNC: 153 MG/DL (ref 70–99)
GLUCOSE BLDC GLUCOMTR-MCNC: 158 MG/DL (ref 70–99)
GLUCOSE BLDC GLUCOMTR-MCNC: 175 MG/DL (ref 70–99)
GLUCOSE SERPL-MCNC: 160 MG/DL (ref 70–99)
HCO3 BLD-SCNC: 29 MMOL/L (ref 21–28)
HCT VFR BLD AUTO: 24.9 % (ref 40–53)
HGB BLD-MCNC: 7.8 G/DL (ref 13.3–17.7)
Lab: ABNORMAL
MAGNESIUM SERPL-MCNC: 2.3 MG/DL (ref 1.6–2.3)
MCH RBC QN AUTO: 30.8 PG (ref 26.5–33)
MCHC RBC AUTO-ENTMCNC: 31.3 G/DL (ref 31.5–36.5)
MCV RBC AUTO: 98 FL (ref 78–100)
O2/TOTAL GAS SETTING VFR VENT: 40 %
OXYHGB MFR BLD: 99 % (ref 92–100)
PCO2 BLD: 39 MM HG (ref 35–45)
PH BLD: 7.47 PH (ref 7.35–7.45)
PHOSPHATE SERPL-MCNC: 3.1 MG/DL (ref 2.5–4.5)
PLATELET # BLD AUTO: 798 10E9/L (ref 150–450)
PO2 BLD: 153 MM HG (ref 80–105)
POTASSIUM SERPL-SCNC: 3.5 MMOL/L (ref 3.4–5.3)
PROCALCITONIN SERPL-MCNC: 0.27 NG/ML
RBC # BLD AUTO: 2.53 10E12/L (ref 4.4–5.9)
SODIUM SERPL-SCNC: 142 MMOL/L (ref 133–144)
SPECIMEN SOURCE: ABNORMAL
SPECIMEN SOURCE: NORMAL
SPECIMEN SOURCE: NORMAL
WBC # BLD AUTO: 20.5 10E9/L (ref 4–11)

## 2021-05-31 PROCEDURE — 200N000002 HC R&B ICU UMMC

## 2021-05-31 PROCEDURE — 999N000157 HC STATISTIC RCP TIME EA 10 MIN

## 2021-05-31 PROCEDURE — 70498 CT ANGIOGRAPHY NECK: CPT | Mod: 26 | Performed by: RADIOLOGY

## 2021-05-31 PROCEDURE — 250N000013 HC RX MED GY IP 250 OP 250 PS 637: Performed by: STUDENT IN AN ORGANIZED HEALTH CARE EDUCATION/TRAINING PROGRAM

## 2021-05-31 PROCEDURE — 999N000015 HC STATISTIC ARTERIAL MONITORING DAILY

## 2021-05-31 PROCEDURE — 250N000011 HC RX IP 250 OP 636: Performed by: STUDENT IN AN ORGANIZED HEALTH CARE EDUCATION/TRAINING PROGRAM

## 2021-05-31 PROCEDURE — 70450 CT HEAD/BRAIN W/O DYE: CPT

## 2021-05-31 PROCEDURE — 82805 BLOOD GASES W/O2 SATURATION: CPT | Performed by: NEUROLOGICAL SURGERY

## 2021-05-31 PROCEDURE — 93886 INTRACRANIAL COMPLETE STUDY: CPT

## 2021-05-31 PROCEDURE — 999N000076 HC STATISTIC ICP MONITORING

## 2021-05-31 PROCEDURE — 94003 VENT MGMT INPAT SUBQ DAY: CPT

## 2021-05-31 PROCEDURE — 94640 AIRWAY INHALATION TREATMENT: CPT | Mod: 76

## 2021-05-31 PROCEDURE — 250N000009 HC RX 250: Performed by: PSYCHIATRY & NEUROLOGY

## 2021-05-31 PROCEDURE — 250N000013 HC RX MED GY IP 250 OP 250 PS 637: Performed by: NURSE PRACTITIONER

## 2021-05-31 PROCEDURE — 83735 ASSAY OF MAGNESIUM: CPT | Performed by: STUDENT IN AN ORGANIZED HEALTH CARE EDUCATION/TRAINING PROGRAM

## 2021-05-31 PROCEDURE — 85027 COMPLETE CBC AUTOMATED: CPT | Performed by: STUDENT IN AN ORGANIZED HEALTH CARE EDUCATION/TRAINING PROGRAM

## 2021-05-31 PROCEDURE — 258N000003 HC RX IP 258 OP 636: Performed by: STUDENT IN AN ORGANIZED HEALTH CARE EDUCATION/TRAINING PROGRAM

## 2021-05-31 PROCEDURE — 71045 X-RAY EXAM CHEST 1 VIEW: CPT

## 2021-05-31 PROCEDURE — 99207 CT HEAD W/O CONTRAST: CPT | Mod: 26 | Performed by: RADIOLOGY

## 2021-05-31 PROCEDURE — 0042T CT HEAD PERFUSION WITH CONTRAST: CPT

## 2021-05-31 PROCEDURE — 250N000009 HC RX 250: Performed by: STUDENT IN AN ORGANIZED HEALTH CARE EDUCATION/TRAINING PROGRAM

## 2021-05-31 PROCEDURE — 84145 PROCALCITONIN (PCT): CPT | Performed by: STUDENT IN AN ORGANIZED HEALTH CARE EDUCATION/TRAINING PROGRAM

## 2021-05-31 PROCEDURE — 70496 CT ANGIOGRAPHY HEAD: CPT | Mod: 26 | Performed by: RADIOLOGY

## 2021-05-31 PROCEDURE — 80048 BASIC METABOLIC PNL TOTAL CA: CPT | Performed by: STUDENT IN AN ORGANIZED HEALTH CARE EDUCATION/TRAINING PROGRAM

## 2021-05-31 PROCEDURE — 258N000003 HC RX IP 258 OP 636: Performed by: NEUROLOGICAL SURGERY

## 2021-05-31 PROCEDURE — 84100 ASSAY OF PHOSPHORUS: CPT | Performed by: STUDENT IN AN ORGANIZED HEALTH CARE EDUCATION/TRAINING PROGRAM

## 2021-05-31 PROCEDURE — 99291 CRITICAL CARE FIRST HOUR: CPT | Mod: GC | Performed by: PSYCHIATRY & NEUROLOGY

## 2021-05-31 PROCEDURE — 99207 CT HEAD PERFUSION WITH CONTRAST: CPT | Mod: 26 | Performed by: RADIOLOGY

## 2021-05-31 PROCEDURE — 70496 CT ANGIOGRAPHY HEAD: CPT

## 2021-05-31 PROCEDURE — 93886 INTRACRANIAL COMPLETE STUDY: CPT | Mod: 26 | Performed by: RADIOLOGY

## 2021-05-31 PROCEDURE — 250N000013 HC RX MED GY IP 250 OP 250 PS 637: Performed by: NEUROLOGICAL SURGERY

## 2021-05-31 PROCEDURE — 999N000185 HC STATISTIC TRANSPORT TIME EA 15 MIN

## 2021-05-31 PROCEDURE — 999N001017 HC STATISTIC GLUCOSE BY METER IP

## 2021-05-31 PROCEDURE — 71045 X-RAY EXAM CHEST 1 VIEW: CPT | Mod: 26 | Performed by: RADIOLOGY

## 2021-05-31 PROCEDURE — 94640 AIRWAY INHALATION TREATMENT: CPT

## 2021-05-31 PROCEDURE — 250N000011 HC RX IP 250 OP 636: Performed by: NEUROLOGICAL SURGERY

## 2021-05-31 RX ORDER — NYSTATIN 100000/ML
500000 SUSPENSION, ORAL (FINAL DOSE FORM) ORAL 4 TIMES DAILY
Status: DISCONTINUED | OUTPATIENT
Start: 2021-05-31 | End: 2021-06-11 | Stop reason: HOSPADM

## 2021-05-31 RX ORDER — NYSTATIN 100000/ML
500000 SUSPENSION, ORAL (FINAL DOSE FORM) ORAL 4 TIMES DAILY
Status: DISCONTINUED | OUTPATIENT
Start: 2021-05-31 | End: 2021-05-31

## 2021-05-31 RX ORDER — IOPAMIDOL 755 MG/ML
115 INJECTION, SOLUTION INTRAVASCULAR ONCE
Status: COMPLETED | OUTPATIENT
Start: 2021-05-31 | End: 2021-05-31

## 2021-05-31 RX ORDER — POTASSIUM CHLORIDE 1.5 G/1.58G
20 POWDER, FOR SOLUTION ORAL ONCE
Status: COMPLETED | OUTPATIENT
Start: 2021-05-31 | End: 2021-05-31

## 2021-05-31 RX ADMIN — INSULIN ASPART 1 UNITS: 100 INJECTION, SOLUTION INTRAVENOUS; SUBCUTANEOUS at 08:05

## 2021-05-31 RX ADMIN — FAMOTIDINE 20 MG: 20 TABLET ORAL at 07:51

## 2021-05-31 RX ADMIN — NYSTATIN 500000 UNITS: 500000 SUSPENSION ORAL at 19:42

## 2021-05-31 RX ADMIN — ACETAMINOPHEN 650 MG: 325 TABLET, FILM COATED ORAL at 14:44

## 2021-05-31 RX ADMIN — NYSTATIN 500000 UNITS: 500000 SUSPENSION ORAL at 12:07

## 2021-05-31 RX ADMIN — INSULIN ASPART 1 UNITS: 100 INJECTION, SOLUTION INTRAVENOUS; SUBCUTANEOUS at 04:21

## 2021-05-31 RX ADMIN — Medication 10 ML: at 03:59

## 2021-05-31 RX ADMIN — MILRINONE LACTATE IN DEXTROSE 1 MCG/KG/MIN: 200 INJECTION, SOLUTION INTRAVENOUS at 04:00

## 2021-05-31 RX ADMIN — VANCOMYCIN HYDROCHLORIDE 1750 MG: 10 INJECTION, POWDER, LYOPHILIZED, FOR SOLUTION INTRAVENOUS at 18:21

## 2021-05-31 RX ADMIN — NYSTATIN 500000 UNITS: 100000 SUSPENSION ORAL at 07:52

## 2021-05-31 RX ADMIN — IPRATROPIUM BROMIDE AND ALBUTEROL SULFATE 3 ML: 2.5; .5 SOLUTION RESPIRATORY (INHALATION) at 07:33

## 2021-05-31 RX ADMIN — Medication 1 PACKET: at 19:42

## 2021-05-31 RX ADMIN — POTASSIUM CHLORIDE 20 MEQ: 1.5 POWDER, FOR SOLUTION ORAL at 05:44

## 2021-05-31 RX ADMIN — CEFEPIME 2 G: 2 INJECTION, POWDER, FOR SOLUTION INTRAVENOUS at 10:26

## 2021-05-31 RX ADMIN — DOCUSATE SODIUM 50 MG AND SENNOSIDES 8.6 MG 1 TABLET: 8.6; 5 TABLET, FILM COATED ORAL at 07:51

## 2021-05-31 RX ADMIN — OXYCODONE HYDROCHLORIDE 5 MG: 5 TABLET ORAL at 04:00

## 2021-05-31 RX ADMIN — CEFEPIME 2 G: 2 INJECTION, POWDER, FOR SOLUTION INTRAVENOUS at 18:34

## 2021-05-31 RX ADMIN — MILRINONE LACTATE IN DEXTROSE 1 MCG/KG/MIN: 200 INJECTION, SOLUTION INTRAVENOUS at 18:21

## 2021-05-31 RX ADMIN — DOCUSATE SODIUM 50 MG AND SENNOSIDES 8.6 MG 1 TABLET: 8.6; 5 TABLET, FILM COATED ORAL at 19:42

## 2021-05-31 RX ADMIN — CEFEPIME 2 G: 2 INJECTION, POWDER, FOR SOLUTION INTRAVENOUS at 02:06

## 2021-05-31 RX ADMIN — MILRINONE LACTATE IN DEXTROSE 1 MCG/KG/MIN: 200 INJECTION, SOLUTION INTRAVENOUS at 23:12

## 2021-05-31 RX ADMIN — LABETALOL HYDROCHLORIDE 10 MG: 5 INJECTION, SOLUTION INTRAVENOUS at 20:49

## 2021-05-31 RX ADMIN — CHLOROTHIAZIDE SODIUM 1000 MG: 500 INJECTION, POWDER, LYOPHILIZED, FOR SOLUTION INTRAVENOUS at 00:55

## 2021-05-31 RX ADMIN — INSULIN ASPART 1 UNITS: 100 INJECTION, SOLUTION INTRAVENOUS; SUBCUTANEOUS at 12:17

## 2021-05-31 RX ADMIN — OXYCODONE HYDROCHLORIDE 5 MG: 5 TABLET ORAL at 10:33

## 2021-05-31 RX ADMIN — NIMODIPINE 60 MG: 30 SOLUTION ORAL at 03:59

## 2021-05-31 RX ADMIN — NIMODIPINE 60 MG: 30 SOLUTION ORAL at 12:07

## 2021-05-31 RX ADMIN — ACETAMINOPHEN 650 MG: 325 TABLET, FILM COATED ORAL at 04:00

## 2021-05-31 RX ADMIN — MILRINONE LACTATE IN DEXTROSE 1 MCG/KG/MIN: 200 INJECTION, SOLUTION INTRAVENOUS at 13:31

## 2021-05-31 RX ADMIN — Medication 10 ML: at 07:52

## 2021-05-31 RX ADMIN — IPRATROPIUM BROMIDE AND ALBUTEROL SULFATE 3 ML: 2.5; .5 SOLUTION RESPIRATORY (INHALATION) at 15:11

## 2021-05-31 RX ADMIN — POTASSIUM CHLORIDE 20 MEQ: 1.5 POWDER, FOR SOLUTION ORAL at 12:07

## 2021-05-31 RX ADMIN — FAMOTIDINE 20 MG: 20 TABLET ORAL at 19:42

## 2021-05-31 RX ADMIN — INSULIN ASPART 2 UNITS: 100 INJECTION, SOLUTION INTRAVENOUS; SUBCUTANEOUS at 20:02

## 2021-05-31 RX ADMIN — IPRATROPIUM BROMIDE AND ALBUTEROL SULFATE 3 ML: 2.5; .5 SOLUTION RESPIRATORY (INHALATION) at 20:43

## 2021-05-31 RX ADMIN — LABETALOL HYDROCHLORIDE 10 MG: 5 INJECTION, SOLUTION INTRAVENOUS at 22:53

## 2021-05-31 RX ADMIN — INSULIN ASPART 1 UNITS: 100 INJECTION, SOLUTION INTRAVENOUS; SUBCUTANEOUS at 16:22

## 2021-05-31 RX ADMIN — OXYCODONE HYDROCHLORIDE 5 MG: 5 TABLET ORAL at 14:44

## 2021-05-31 RX ADMIN — HEPARIN SODIUM 5000 UNITS: 5000 INJECTION, SOLUTION INTRAVENOUS; SUBCUTANEOUS at 07:51

## 2021-05-31 RX ADMIN — HEPARIN SODIUM 5000 UNITS: 5000 INJECTION, SOLUTION INTRAVENOUS; SUBCUTANEOUS at 00:08

## 2021-05-31 RX ADMIN — Medication 10 ML: at 19:43

## 2021-05-31 RX ADMIN — IPRATROPIUM BROMIDE AND ALBUTEROL SULFATE 3 ML: 2.5; .5 SOLUTION RESPIRATORY (INHALATION) at 03:09

## 2021-05-31 RX ADMIN — Medication 10 ML: at 12:07

## 2021-05-31 RX ADMIN — Medication 1 PACKET: at 13:31

## 2021-05-31 RX ADMIN — HEPARIN SODIUM 5000 UNITS: 5000 INJECTION, SOLUTION INTRAVENOUS; SUBCUTANEOUS at 16:07

## 2021-05-31 RX ADMIN — POTASSIUM CHLORIDE 20 MEQ: 1.5 POWDER, FOR SOLUTION ORAL at 19:42

## 2021-05-31 RX ADMIN — VANCOMYCIN HYDROCHLORIDE 1750 MG: 10 INJECTION, POWDER, LYOPHILIZED, FOR SOLUTION INTRAVENOUS at 05:45

## 2021-05-31 RX ADMIN — Medication 1 PACKET: at 07:52

## 2021-05-31 RX ADMIN — IOPAMIDOL 115 ML: 755 INJECTION, SOLUTION INTRAVENOUS at 09:45

## 2021-05-31 RX ADMIN — POLYETHYLENE GLYCOL 3350 17 G: 17 POWDER, FOR SOLUTION ORAL at 07:51

## 2021-05-31 RX ADMIN — NIMODIPINE 60 MG: 30 SOLUTION ORAL at 16:07

## 2021-05-31 RX ADMIN — Medication 10 ML: at 16:07

## 2021-05-31 RX ADMIN — NIMODIPINE 60 MG: 30 SOLUTION ORAL at 19:42

## 2021-05-31 RX ADMIN — NIMODIPINE 60 MG: 30 SOLUTION ORAL at 07:51

## 2021-05-31 RX ADMIN — MILRINONE LACTATE IN DEXTROSE 1 MCG/KG/MIN: 200 INJECTION, SOLUTION INTRAVENOUS at 08:40

## 2021-05-31 RX ADMIN — Medication 0.05 MCG/KG/MIN: at 00:16

## 2021-05-31 RX ADMIN — NYSTATIN 500000 UNITS: 500000 SUSPENSION ORAL at 16:07

## 2021-05-31 RX ADMIN — ACETAMINOPHEN 650 MG: 325 TABLET, FILM COATED ORAL at 10:33

## 2021-05-31 ASSESSMENT — ACTIVITIES OF DAILY LIVING (ADL)
ADLS_ACUITY_SCORE: 26

## 2021-05-31 ASSESSMENT — VISUAL ACUITY
OU: NOT TESTABLE

## 2021-05-31 ASSESSMENT — MIFFLIN-ST. JEOR: SCORE: 1512.13

## 2021-05-31 NOTE — PLAN OF CARE
Major Shift Events: Intermittently follows commands, spontaneously opening eyes, pupils unequal R>L, reactive and brisk, not tracking. LUE/LLE strength moderate, RUE weak  and RLE withdraws. EVD @ 10 above EAM, ICP 2-10, output 2-21 ml/hr, ranjit in color. NSR, levo started with MAP <70, afebrile. CMV settings, breathing above vent, +gag reflex, thinning secretions, suctioning hourly. Thrush noted, nystatin scheduled. NJ to TF @ goal/ 50 ml q2h flushes, 2x BM. Condom cath with good UO, Diuril given x1. Milrinone @ 1 mcg/kg/min and Dex @ 0.4, int abx of cefepime and vanco given. Oxy & tyl given x2 for agitation. K 3.5- replacing. Wife, Susy updated x2 overnight.    Plan: TCD this AM for potential CTA/CTP if needed. Pressure support this AM.   For vital signs and complete assessments, please see documentation flowsheets.

## 2021-05-31 NOTE — PROGRESS NOTES
"S:   Stable neuro exam  TCDs improved  WBC up slightly to 28  Pressure supporting  Milrinone starting    O:  /83   Pulse 81   Temp 98  F (36.7  C) (Axillary)   Resp 25   Ht 1.803 m (5' 11\")   Wt 70.6 kg (155 lb 10.3 oz)   SpO2 97%   BMI 21.71 kg/m    Spontaneously moves LUE/LLE and w/draws in RLE, RUE is plegic, does not respond to commands    Subarachnoid hemorrhage (MF4, HH5), aneurysmal; PBD15 HH5, mF4; POD15 R PCOM clipping with completed bilateral BAYLEE infarcts.  Taken to angio on 5/28 showing mild vasospasm of bilateral ACAs, given verapamil in bilateral ICAs.  TCDs slightly improved today    [] Follow neuro exam for vasospasm watch, cont BP augmentation and milrinone protocol      "

## 2021-05-31 NOTE — PLAN OF CARE
Major Shift Events:  Int. Follows commands. Rt pupil >left. LUE and LLE with purposeful movement. RUE minimal movement. RLE withdrawals to pain. Sedation stopped at 0800. Lethargic throughout day. ICP: 4-10, OP:0-10. HR SR. SBP 150s-190s, no PRNs needed. ETT withdrawn a minimal amount. Tolerated PS all shift. Frequent cough and suctioning, Q1-2. TF at goal. Condom cath in place with adequate OP. Small loose BM. Milrinone continued at straight rate. Oxy and tylenol given x2 for discomfort.     Plan: Continue Neuro checks, possible extubation tomorrow.     For vital signs and complete assessments, please see documentation flowsheets.

## 2021-05-31 NOTE — PROGRESS NOTES
Neurosurgery progress note    S: no acute events overnight; exam fluctuating    O:  Temp:  [97.6  F (36.4  C)-101.5  F (38.6  C)] 101.5  F (38.6  C)  Pulse:  [73-87] 87  Resp:  [16-29] 26  BP: (166)/(85) 166/85  MAP:  [81 mmHg-147 mmHg] 147 mmHg  Arterial Line BP: ()/() 172/130  FiO2 (%):  [40 %] 40 %  SpO2:  [97 %-99 %] 97 %    Exam:  Incision: c/d/i, drain and EVD in place  Intubated   Open eyes to strong stimulation   Localizes b/l UE L>R  W/d b/l LE        Imaging:  CTA/CTP head 5/31  Impression:   1. Multifocal evolving infarction within the right anterior frontal, high parietal, left frontoparietal, and the right anterior temporal lobes. No new area of infarction.  2. Overall head CT findings are stable compared to yesterday head CT, including the postoperative changes of right posterior communicating artery aneurysm clipping, shunted ventricular system, and intracranial  hemorrhages as described above.   3. CT angiogram of the head demonstrates slight improvement in the diffuse narrowing of the intracranial arteries that was observed yesterday however the vessel calibers appear still narrower compared with the baseline 5/19/2021 dated study.   4. CT perfusion parameters specifically CBF and CBV are congruent suggesting that there is no evidence of presence of ischemic penumbra.      ASSESSMENT: 70 year old man presented with AMS found to have aSAH with right supraclinoid aneurysm. mFS4 and HH5. GCS 5. S/p EVD placement, DCA,s/p aneurysm clipping on 5/16.     The following conditions are also present, complicating the patient's current management, as described in the Assessment and Plan:   mechanical ventilation on day of admission, intracerebral hematoma, brain compression, acute respiratory failure, coma, based on current GCS of 5 and cerebral aneurysm rupture with hunt sims scale 5 modified lowery 4     Hypokalemia   Severe malnutrition in the context of acute on chronic illness  Respiratory  failure  Pneumonia     RECOMMENDATIONS:  - EVD: 10 open  - Q1hr neuro exam  - SBP<200  - HOB > 30 degrees  - wean sedation as possible  - follow-up clx  -- Cefepime/Vancomycin for PNA  - Continuous cardiac monitoring while in ICU   - Nimodipine (total 21 days)  - daily TCDs, low threshold for CTA and CTP  - Glucose < 180  - Continue glucose checks  - Platelets > 100,000  - INR < 1.5  - Hemoglobin > 8  - DVT: SCDs while in bed  - Disposition: 4A  - PT/OT consulted        Samuel Nolasco MD, PhD  Neurosurgery Resident PGY-2    Please contact neurosurgery resident on call with questions.    Dial * * [phone], enter [phone] when prompted.

## 2021-05-31 NOTE — PROGRESS NOTES
"Neuroscience Intensive Care Progress Note  05/31/2021    Problem List  1.  Aneurysmal subarachnoid hemorrhage, HH 5, MF 4, bleed day 5/15  2.  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  Hydrocephalus status post EVD 5/15  4.  Acute Bilateral Hemispheric Infarct  5. Delayed Cerebral Ischemia  6.  Hypertension  7.  Acute hypoxic respiratory failure  8.  Hypernatremia  9.  Hyperchloremia  12. Leukocytosis  13. Normocytic anemia  14. Probable Ventriculitis     AE  - NAEO    Current Medications:    ceFEPIme (MAXIPIME) IV  2 g Intravenous Q8H     famotidine  20 mg Oral or Feeding Tube BID     heparin ANTICOAGULANT  5,000 Units Subcutaneous Q8H     insulin aspart  1-12 Units Subcutaneous Q4H     ipratropium - albuterol 0.5 mg/2.5 mg/3 mL  3 mL Nebulization Q6H     niMODipine  60 mg Oral or Feeding Tube Q4H MARAH    And     sodium chloride 0.9 %  10 mL Oral or Feeding Tube Q4H MARAH     nystatin  500,000 Units Oral 4x Daily     polyethylene glycol  17 g Oral or Feeding Tube Daily     potassium chloride  20 mEq Oral or Feeding Tube BID     protein modular  1 packet Per Feeding Tube TID     senna-docusate  1 tablet Oral or Feeding Tube BID    Or     senna-docusate  2 tablet Oral or Feeding Tube BID     vancomycin (VANCOCIN) IV  1,750 mg Intravenous Q12H       PRN Medications:  acetaminophen, bisacodyl, glucose **OR** dextrose **OR** glucagon, hydrALAZINE, labetalol, metoclopramide **OR** metoclopramide, naloxone **OR** naloxone **OR** naloxone **OR** naloxone, ondansetron **OR** ondansetron, oxyCODONE, prochlorperazine **OR** prochlorperazine **OR** prochlorperazine, QUEtiapine    Infusions:    dexmedetomidine 0.4 mcg/kg/hr (05/31/21 0700)     milrinone 1 mcg/kg/min (05/31/21 0700)     norepinephrine Stopped (05/31/21 0100)       No Known Allergies    Physical Examination:  Vitals: /83   Pulse 88   Temp 98.3  F (36.8  C) (Axillary)   Resp 23   Ht 1.803 m (5' 11\")   Wt 72 kg (158 lb 11.7 oz)   SpO2 " 97%   BMI 22.14 kg/m    General: Adult male patient, lying in bed, NAD  HEENT: Normocephalic/Atraumatic, no icterus, Oral cavity/Oropharynx pink and moist  Cardiac: RRR  Chest: No SOB, pattern regular, unlabored, expansion symmetric, no retractions or use of accessory muscles, assisted mechanically   Abdomen: Soft, bowel sounds present  Extremities: Warm, no edema  Skin: No rash or lesion   Psych: Restless, agitation   Neuro:  Mental status: Sedated, intubated, tracking, following simple commands to wiggles toes, not following commands,  Cranial nerves: PERRL, conjugate gaze, pupils 3mm round and reactive, cough and gag intact with deep suctioning.   Motor: Normal bulk and tone. No abnormal movements. antigravity in left arm and leg. Wiggling fingers; withdraws minimally on RLE, LLE withdrawal. LUE spontaneous  Sensory: Withdraws to noxious stimuli in left upper and lower ; more brisk on left  Coordination: deferred due to acuity  Gait: deferred due to acuity      Imaging:  I personally reviewed on labs and imaging in EMR.    Yesterday:  1. Repeat CT/MRI shows completed infarct in bilateral OLIVIER territory infarct.   2. TCDs shows bilateral OLIVIER with elevated velocities  3. Angiogram revealed mild vasospasm and IA verapamil given     Yesterday:  1. Hold precedex; continue to avoid adding fentanyl drip  2. TCDs with L olivier highest 107 with stable exam;   3. Stop vEEG  4. Continue milrenone, levophed for MAPs > 70; CPP > 60  5. Diuresis with hydrochlorothiazide 500mg    Yesterday/Overnight:  1. Diuril 2g   2. ABG x 1 overnight; 7.45/42/367/30/5      Assessment/Plan  #Subarachnoid hemorrhage (MF4, HH5), aneurysmal;   #vEEG stopped  PBD16 HH5, mF4; POD16 R PCOM clipping;  - Neuro checks q2h  - SBP < 200  - Nimodipine 60 mg q4h  - Daily TCDs yesterday; highest L  today  - HOB > 30 degrees;   - f/u CTA, CTP today    #Bilateral Delayed Cerebral Ischemia, likely from vasospasm   - angio yesterday with mild vasospasm,  s/p b/l ICA verapamil  - CTA from yesterday with diffuse b/l anterior and posterior circulation vasospasm; now with milrenone and norepinephrine  - milrenone 1mcg/kg/min  - levophed for MAPs > 70; CPP > 60   - only required for brief period overnight    #IVH with subsequent hydrocephalus   #Brain compression with midline shift R>L  - EVD placed 5/15  - EVD remains at 10, ICP 2-7, drained 143 yesterday, 74cc since midnight;      #Agitation/sedation  - sedation using precedex 0.4 fentanyl drip off currently;     #Analgesics  - Oxycodone 5q4 PRN     CV:  #HTN  #ECHO completed on 5/17 + 5/27; EF 60-65%  #Diffuse vasospasm found on CT-A  - milerenone 1mcg/kg/min  - levophed for MAPs > 70; CPP > 60  - SBP < 200 (100-140s)  - Holding PTA Lisinopril  - Hydralazine and Labetalol PRN  -  5/24     Pulm:  #Acute hypoxic respiratory failure  #mixed acid base disorder   #?compensated metabolic alkalosis from contraction alkalosis with respiratory compensation  Re-intubated 5/19  - CXR overnight with ETT 1.5cm above lisha; repeat CXR ordered for this AM  - Currently on minimal vent settings  - WIll aim for net even this AM, currently -113.    - net even goal    Ventilation Mode: CPAP/PS  (Continuous positive airway pressure with Pressure Support)  FiO2 (%): 40 %  Rate Set (breaths/minute): 14 breaths/min  Tidal Volume Set (mL): 450 mL  PEEP (cm H2O): 6 cmH2O  Pressure Support (cm H2O): 7 cmH2O  Oxygen Concentration (%): 40 %  Resp: 22    ABG from today: 7.47/39/153/29/4.5     GI/Nutrition:  #Severe malnutrition in the context of acute on chronic illness  - Protein modular 1 Pkt  - Multivitamins w/minerals  - Diet: TF's @ goal (60)  - NG   - resume senna   - cdiff negative.     Renal: I/o: Net +1L yesterday (2.7LUOP out; 4L in) ;   +12.8L since admission; -100 ml; 1.7L UOP this AM. Will aim for net even today to slightly negative.  - FWF 50q2.    #Urinary retention  - Doxazosin 1 mg daily; will consider restarting  tomorrow.  - IV Fluids: None  - BMP daily  - Electrolyte replacement protocol in place     Endo:  #Hyperglycemia   - Hgb A1c 5.5  - Follow glucose levels     Heme:  #Leukocytosis, improving  #Normocyctic anemia  - CBC daily  - UE/LE duplex negative     ID:  #afebrile overnight,  WBC 20 from 29  #procal downtrending .27  #Aspiration pneumonia  #Probable Ventriculitis   #NGTD; gm stain with GNRs; 5/22 sputum with moderate growth pseudomonas, klebsiella which is pansensitive  - 5/28, 5/25, 5/19 pancultured for only heavy growth pseudomonas, Klebsiella oxytoca; Staphylococcus epidermidis; Heavy growth beta hemolytic Streptococcus constellatus   - Ceftriaxone (5/21-5/23) was switched to Zosyn (5/23-5/28) switch to cefepime and vancomycin for presumed ventriculitis given elevated WBC/RBC ratio from CSF cell lines  - will continue vanc/cefepime for 14 day course, currently on day 4    PPX:  DVT Prophylaxis  - SCDs in place  - Heparin subcutaneous    GI Prophylaxis  - Famotidine 20 mg BID      Lines/Tubes/Drains:  - PIV x 1  - L subclavian CVC/TL 5/15; triple lumen  - R arterial line  - NG  - EVD  - Continue to condom catheter     Code Status: Full code     Dispo: ICU    The patient was seen and discussed with the attending, Dr. Starr.    David Sands MD  Neurosurgery Resident PGY-1

## 2021-06-01 ENCOUNTER — APPOINTMENT (OUTPATIENT)
Dept: INTERVENTIONAL RADIOLOGY/VASCULAR | Facility: CLINIC | Age: 71
DRG: 003 | End: 2021-06-01
Attending: NEUROLOGICAL SURGERY
Payer: COMMERCIAL

## 2021-06-01 ENCOUNTER — APPOINTMENT (OUTPATIENT)
Dept: ULTRASOUND IMAGING | Facility: CLINIC | Age: 71
DRG: 003 | End: 2021-06-01
Attending: STUDENT IN AN ORGANIZED HEALTH CARE EDUCATION/TRAINING PROGRAM
Payer: COMMERCIAL

## 2021-06-01 LAB
ALBUMIN UR-MCNC: 10 MG/DL
ANION GAP SERPL CALCULATED.3IONS-SCNC: 6 MMOL/L (ref 3–14)
APPEARANCE CSF: ABNORMAL
APPEARANCE UR: CLEAR
BACTERIA #/AREA URNS HPF: ABNORMAL /HPF
BILIRUB UR QL STRIP: NEGATIVE
BUN SERPL-MCNC: 24 MG/DL (ref 7–30)
CALCIUM SERPL-MCNC: 9.4 MG/DL (ref 8.5–10.1)
CHLORIDE SERPL-SCNC: 109 MMOL/L (ref 94–109)
CO2 SERPL-SCNC: 27 MMOL/L (ref 20–32)
COLOR CSF: ABNORMAL
COLOR UR AUTO: ABNORMAL
CREAT SERPL-MCNC: 0.5 MG/DL (ref 0.66–1.25)
CRP SERPL-MCNC: 79 MG/L (ref 0–8)
ERYTHROCYTE [DISTWIDTH] IN BLOOD BY AUTOMATED COUNT: 13.3 % (ref 10–15)
GFR SERPL CREATININE-BSD FRML MDRD: >90 ML/MIN/{1.73_M2}
GLUCOSE BLDC GLUCOMTR-MCNC: 107 MG/DL (ref 70–99)
GLUCOSE BLDC GLUCOMTR-MCNC: 111 MG/DL (ref 70–99)
GLUCOSE BLDC GLUCOMTR-MCNC: 130 MG/DL (ref 70–99)
GLUCOSE BLDC GLUCOMTR-MCNC: 146 MG/DL (ref 70–99)
GLUCOSE BLDC GLUCOMTR-MCNC: 157 MG/DL (ref 70–99)
GLUCOSE BLDC GLUCOMTR-MCNC: 164 MG/DL (ref 70–99)
GLUCOSE CSF-MCNC: 73 MG/DL (ref 40–70)
GLUCOSE SERPL-MCNC: 168 MG/DL (ref 70–99)
GLUCOSE UR STRIP-MCNC: NEGATIVE MG/DL
GRAM STN SPEC: NORMAL
HCT VFR BLD AUTO: 24 % (ref 40–53)
HGB BLD-MCNC: 7.5 G/DL (ref 13.3–17.7)
HGB UR QL STRIP: NEGATIVE
HYALINE CASTS #/AREA URNS LPF: 4 /LPF (ref 0–2)
KETONES UR STRIP-MCNC: NEGATIVE MG/DL
LEUKOCYTE ESTERASE UR QL STRIP: NEGATIVE
LYMPH ABN NFR CSF MANUAL: 2 %
MAGNESIUM SERPL-MCNC: 2.4 MG/DL (ref 1.6–2.3)
MCH RBC QN AUTO: 30.4 PG (ref 26.5–33)
MCHC RBC AUTO-ENTMCNC: 31.3 G/DL (ref 31.5–36.5)
MCV RBC AUTO: 97 FL (ref 78–100)
MONOS+MACROS NFR CSF MANUAL: 12 %
MUCOUS THREADS #/AREA URNS LPF: PRESENT /LPF
NEUTROPHILS NFR CSF MANUAL: 86 %
NITRATE UR QL: NEGATIVE
PH UR STRIP: 5.5 PH (ref 5–7)
PHOSPHATE SERPL-MCNC: 3.1 MG/DL (ref 2.5–4.5)
PLATELET # BLD AUTO: 844 10E9/L (ref 150–450)
POTASSIUM SERPL-SCNC: 4 MMOL/L (ref 3.4–5.3)
PROT CSF-MCNC: 71 MG/DL (ref 15–60)
RBC # BLD AUTO: 2.47 10E12/L (ref 4.4–5.9)
RBC # CSF MANUAL: ABNORMAL /UL (ref 0–2)
RBC #/AREA URNS AUTO: 1 /HPF (ref 0–2)
SODIUM SERPL-SCNC: 142 MMOL/L (ref 133–144)
SOURCE: ABNORMAL
SP GR UR STRIP: 1.02 (ref 1–1.03)
SPECIMEN SOURCE: NORMAL
SQUAMOUS #/AREA URNS AUTO: 0 /HPF (ref 0–1)
TRANS CELLS #/AREA URNS HPF: <1 /HPF (ref 0–1)
TUBE # CSF: ABNORMAL #
UROBILINOGEN UR STRIP-MCNC: NORMAL MG/DL (ref 0–2)
VANCOMYCIN SERPL-MCNC: 18.7 MG/L
WBC # BLD AUTO: 14.9 10E9/L (ref 4–11)
WBC # CSF MANUAL: 142 /UL (ref 0–5)
WBC #/AREA URNS AUTO: 2 /HPF (ref 0–5)

## 2021-06-01 PROCEDURE — 250N000009 HC RX 250: Performed by: PSYCHIATRY & NEUROLOGY

## 2021-06-01 PROCEDURE — 87015 SPECIMEN INFECT AGNT CONCNTJ: CPT | Performed by: STUDENT IN AN ORGANIZED HEALTH CARE EDUCATION/TRAINING PROGRAM

## 2021-06-01 PROCEDURE — 250N000009 HC RX 250: Performed by: STUDENT IN AN ORGANIZED HEALTH CARE EDUCATION/TRAINING PROGRAM

## 2021-06-01 PROCEDURE — 200N000002 HC R&B ICU UMMC

## 2021-06-01 PROCEDURE — 99153 MOD SED SAME PHYS/QHP EA: CPT

## 2021-06-01 PROCEDURE — 84157 ASSAY OF PROTEIN OTHER: CPT | Performed by: STUDENT IN AN ORGANIZED HEALTH CARE EDUCATION/TRAINING PROGRAM

## 2021-06-01 PROCEDURE — 36224 PLACE CATH CAROTD ART: CPT | Mod: 50 | Performed by: RADIOLOGY

## 2021-06-01 PROCEDURE — B3111ZZ FLUOROSCOPY OF RIGHT BRACHIOCEPHALIC-SUBCLAVIAN ARTERY USING LOW OSMOLAR CONTRAST: ICD-10-PCS | Performed by: RADIOLOGY

## 2021-06-01 PROCEDURE — 93886 INTRACRANIAL COMPLETE STUDY: CPT

## 2021-06-01 PROCEDURE — 87070 CULTURE OTHR SPECIMN AEROBIC: CPT | Performed by: STUDENT IN AN ORGANIZED HEALTH CARE EDUCATION/TRAINING PROGRAM

## 2021-06-01 PROCEDURE — 272N000506 HC NEEDLE CR6

## 2021-06-01 PROCEDURE — 89051 BODY FLUID CELL COUNT: CPT | Performed by: STUDENT IN AN ORGANIZED HEALTH CARE EDUCATION/TRAINING PROGRAM

## 2021-06-01 PROCEDURE — 80202 ASSAY OF VANCOMYCIN: CPT | Performed by: STUDENT IN AN ORGANIZED HEALTH CARE EDUCATION/TRAINING PROGRAM

## 2021-06-01 PROCEDURE — 87040 BLOOD CULTURE FOR BACTERIA: CPT | Performed by: NEUROLOGICAL SURGERY

## 2021-06-01 PROCEDURE — 258N000003 HC RX IP 258 OP 636: Performed by: PSYCHIATRY & NEUROLOGY

## 2021-06-01 PROCEDURE — 250N000011 HC RX IP 250 OP 636: Performed by: STUDENT IN AN ORGANIZED HEALTH CARE EDUCATION/TRAINING PROGRAM

## 2021-06-01 PROCEDURE — 87205 SMEAR GRAM STAIN: CPT | Performed by: STUDENT IN AN ORGANIZED HEALTH CARE EDUCATION/TRAINING PROGRAM

## 2021-06-01 PROCEDURE — 93886 INTRACRANIAL COMPLETE STUDY: CPT | Mod: 26 | Performed by: RADIOLOGY

## 2021-06-01 PROCEDURE — 99291 CRITICAL CARE FIRST HOUR: CPT | Mod: GC | Performed by: PSYCHIATRY & NEUROLOGY

## 2021-06-01 PROCEDURE — 999N001017 HC STATISTIC GLUCOSE BY METER IP

## 2021-06-01 PROCEDURE — C1769 GUIDE WIRE: HCPCS

## 2021-06-01 PROCEDURE — 250N000013 HC RX MED GY IP 250 OP 250 PS 637: Performed by: STUDENT IN AN ORGANIZED HEALTH CARE EDUCATION/TRAINING PROGRAM

## 2021-06-01 PROCEDURE — 80048 BASIC METABOLIC PNL TOTAL CA: CPT | Performed by: STUDENT IN AN ORGANIZED HEALTH CARE EDUCATION/TRAINING PROGRAM

## 2021-06-01 PROCEDURE — 76937 US GUIDE VASCULAR ACCESS: CPT | Mod: 26 | Performed by: RADIOLOGY

## 2021-06-01 PROCEDURE — 272N000566 HC SHEATH CR3

## 2021-06-01 PROCEDURE — 99152 MOD SED SAME PHYS/QHP 5/>YRS: CPT | Mod: GC | Performed by: RADIOLOGY

## 2021-06-01 PROCEDURE — 87077 CULTURE AEROBIC IDENTIFY: CPT | Performed by: STUDENT IN AN ORGANIZED HEALTH CARE EDUCATION/TRAINING PROGRAM

## 2021-06-01 PROCEDURE — 82945 GLUCOSE OTHER FLUID: CPT | Performed by: STUDENT IN AN ORGANIZED HEALTH CARE EDUCATION/TRAINING PROGRAM

## 2021-06-01 PROCEDURE — 94640 AIRWAY INHALATION TREATMENT: CPT | Mod: 76

## 2021-06-01 PROCEDURE — C1760 CLOSURE DEV, VASC: HCPCS

## 2021-06-01 PROCEDURE — 83735 ASSAY OF MAGNESIUM: CPT | Performed by: STUDENT IN AN ORGANIZED HEALTH CARE EDUCATION/TRAINING PROGRAM

## 2021-06-01 PROCEDURE — 250N000013 HC RX MED GY IP 250 OP 250 PS 637: Performed by: NURSE PRACTITIONER

## 2021-06-01 PROCEDURE — 999N000155 HC STATISTIC RAPCV CVP MONITORING

## 2021-06-01 PROCEDURE — 255N000002 HC RX 255 OP 636: Performed by: NEUROLOGICAL SURGERY

## 2021-06-01 PROCEDURE — 87186 SC STD MICRODIL/AGAR DIL: CPT | Performed by: STUDENT IN AN ORGANIZED HEALTH CARE EDUCATION/TRAINING PROGRAM

## 2021-06-01 PROCEDURE — 94640 AIRWAY INHALATION TREATMENT: CPT

## 2021-06-01 PROCEDURE — 258N000003 HC RX IP 258 OP 636: Performed by: STUDENT IN AN ORGANIZED HEALTH CARE EDUCATION/TRAINING PROGRAM

## 2021-06-01 PROCEDURE — 85027 COMPLETE CBC AUTOMATED: CPT | Performed by: STUDENT IN AN ORGANIZED HEALTH CARE EDUCATION/TRAINING PROGRAM

## 2021-06-01 PROCEDURE — 999N000157 HC STATISTIC RCP TIME EA 10 MIN

## 2021-06-01 PROCEDURE — 36224 PLACE CATH CAROTD ART: CPT | Mod: 50

## 2021-06-01 PROCEDURE — 99152 MOD SED SAME PHYS/QHP 5/>YRS: CPT

## 2021-06-01 PROCEDURE — 81001 URINALYSIS AUTO W/SCOPE: CPT | Performed by: STUDENT IN AN ORGANIZED HEALTH CARE EDUCATION/TRAINING PROGRAM

## 2021-06-01 PROCEDURE — 250N000013 HC RX MED GY IP 250 OP 250 PS 637: Performed by: NEUROLOGICAL SURGERY

## 2021-06-01 PROCEDURE — 999N000015 HC STATISTIC ARTERIAL MONITORING DAILY

## 2021-06-01 PROCEDURE — 36415 COLL VENOUS BLD VENIPUNCTURE: CPT | Performed by: NEUROLOGICAL SURGERY

## 2021-06-01 PROCEDURE — C1887 CATHETER, GUIDING: HCPCS

## 2021-06-01 PROCEDURE — 999N000185 HC STATISTIC TRANSPORT TIME EA 15 MIN

## 2021-06-01 PROCEDURE — 87075 CULTR BACTERIA EXCEPT BLOOD: CPT | Performed by: STUDENT IN AN ORGANIZED HEALTH CARE EDUCATION/TRAINING PROGRAM

## 2021-06-01 PROCEDURE — 36225 PLACE CATH SUBCLAVIAN ART: CPT | Mod: 50 | Performed by: RADIOLOGY

## 2021-06-01 PROCEDURE — B3181ZZ FLUOROSCOPY OF BILATERAL INTERNAL CAROTID ARTERIES USING LOW OSMOLAR CONTRAST: ICD-10-PCS | Performed by: RADIOLOGY

## 2021-06-01 PROCEDURE — 86140 C-REACTIVE PROTEIN: CPT | Performed by: STUDENT IN AN ORGANIZED HEALTH CARE EDUCATION/TRAINING PROGRAM

## 2021-06-01 PROCEDURE — 94003 VENT MGMT INPAT SUBQ DAY: CPT

## 2021-06-01 PROCEDURE — B3121ZZ FLUOROSCOPY OF LEFT SUBCLAVIAN ARTERY USING LOW OSMOLAR CONTRAST: ICD-10-PCS | Performed by: RADIOLOGY

## 2021-06-01 PROCEDURE — 84100 ASSAY OF PHOSPHORUS: CPT | Performed by: STUDENT IN AN ORGANIZED HEALTH CARE EDUCATION/TRAINING PROGRAM

## 2021-06-01 RX ORDER — NALOXONE HYDROCHLORIDE 0.4 MG/ML
0.2 INJECTION, SOLUTION INTRAMUSCULAR; INTRAVENOUS; SUBCUTANEOUS
Status: DISCONTINUED | OUTPATIENT
Start: 2021-06-01 | End: 2021-06-01

## 2021-06-01 RX ORDER — DEXTROSE MONOHYDRATE 25 G/50ML
25-50 INJECTION, SOLUTION INTRAVENOUS
Status: DISCONTINUED | OUTPATIENT
Start: 2021-06-01 | End: 2021-06-03

## 2021-06-01 RX ORDER — PROCHLORPERAZINE MALEATE 5 MG
5 TABLET ORAL EVERY 6 HOURS PRN
Status: DISCONTINUED | OUTPATIENT
Start: 2021-06-01 | End: 2021-06-03

## 2021-06-01 RX ORDER — NALOXONE HYDROCHLORIDE 0.4 MG/ML
0.4 INJECTION, SOLUTION INTRAMUSCULAR; INTRAVENOUS; SUBCUTANEOUS
Status: DISCONTINUED | OUTPATIENT
Start: 2021-06-01 | End: 2021-06-01

## 2021-06-01 RX ORDER — ONDANSETRON 4 MG/1
4 TABLET, ORALLY DISINTEGRATING ORAL EVERY 6 HOURS PRN
Status: DISCONTINUED | OUTPATIENT
Start: 2021-06-01 | End: 2021-06-03

## 2021-06-01 RX ORDER — ONDANSETRON 2 MG/ML
4 INJECTION INTRAMUSCULAR; INTRAVENOUS EVERY 6 HOURS PRN
Status: DISCONTINUED | OUTPATIENT
Start: 2021-06-01 | End: 2021-06-03

## 2021-06-01 RX ORDER — METOCLOPRAMIDE 10 MG/1
10 TABLET ORAL EVERY 6 HOURS PRN
Status: DISCONTINUED | OUTPATIENT
Start: 2021-06-01 | End: 2021-06-03

## 2021-06-01 RX ORDER — HEPARIN SODIUM 200 [USP'U]/100ML
1 INJECTION, SOLUTION INTRAVENOUS CONTINUOUS PRN
Status: DISCONTINUED | OUTPATIENT
Start: 2021-06-01 | End: 2021-06-01

## 2021-06-01 RX ORDER — SODIUM CHLORIDE 9 MG/ML
INJECTION, SOLUTION INTRAVENOUS CONTINUOUS
Status: DISCONTINUED | OUTPATIENT
Start: 2021-06-01 | End: 2021-06-01

## 2021-06-01 RX ORDER — LIDOCAINE 40 MG/G
CREAM TOPICAL
Status: DISCONTINUED | OUTPATIENT
Start: 2021-06-01 | End: 2021-06-03

## 2021-06-01 RX ORDER — FLUMAZENIL 0.1 MG/ML
0.2 INJECTION, SOLUTION INTRAVENOUS
Status: DISCONTINUED | OUTPATIENT
Start: 2021-06-01 | End: 2021-06-01

## 2021-06-01 RX ORDER — DEXTROSE MONOHYDRATE 25 G/50ML
25-50 INJECTION, SOLUTION INTRAVENOUS
Status: DISCONTINUED | OUTPATIENT
Start: 2021-06-01 | End: 2021-06-01

## 2021-06-01 RX ORDER — NICOTINE POLACRILEX 4 MG
15-30 LOZENGE BUCCAL
Status: DISCONTINUED | OUTPATIENT
Start: 2021-06-01 | End: 2021-06-03

## 2021-06-01 RX ORDER — PROCHLORPERAZINE 25 MG
12.5 SUPPOSITORY, RECTAL RECTAL EVERY 12 HOURS PRN
Status: DISCONTINUED | OUTPATIENT
Start: 2021-06-01 | End: 2021-06-03

## 2021-06-01 RX ORDER — FENTANYL CITRATE 50 UG/ML
25-50 INJECTION, SOLUTION INTRAMUSCULAR; INTRAVENOUS EVERY 5 MIN PRN
Status: DISCONTINUED | OUTPATIENT
Start: 2021-06-01 | End: 2021-06-03

## 2021-06-01 RX ORDER — DOXAZOSIN 1 MG/1
1 TABLET ORAL DAILY
Status: DISCONTINUED | OUTPATIENT
Start: 2021-06-01 | End: 2021-06-10

## 2021-06-01 RX ORDER — FENTANYL CITRATE 50 UG/ML
25-50 INJECTION, SOLUTION INTRAMUSCULAR; INTRAVENOUS EVERY 5 MIN PRN
Status: DISCONTINUED | OUTPATIENT
Start: 2021-06-01 | End: 2021-06-01

## 2021-06-01 RX ORDER — ACETAMINOPHEN 325 MG/1
325 TABLET ORAL EVERY 4 HOURS PRN
Status: DISCONTINUED | OUTPATIENT
Start: 2021-06-01 | End: 2021-06-02

## 2021-06-01 RX ORDER — METOCLOPRAMIDE HYDROCHLORIDE 5 MG/ML
5 INJECTION INTRAMUSCULAR; INTRAVENOUS EVERY 6 HOURS PRN
Status: DISCONTINUED | OUTPATIENT
Start: 2021-06-01 | End: 2021-06-03

## 2021-06-01 RX ORDER — NICOTINE POLACRILEX 4 MG
15-30 LOZENGE BUCCAL
Status: DISCONTINUED | OUTPATIENT
Start: 2021-06-01 | End: 2021-06-01

## 2021-06-01 RX ORDER — SODIUM CHLORIDE FOR INHALATION 3 %
3 VIAL, NEBULIZER (ML) INHALATION EVERY 6 HOURS
Status: DISCONTINUED | OUTPATIENT
Start: 2021-06-01 | End: 2021-06-11 | Stop reason: HOSPADM

## 2021-06-01 RX ORDER — IODIXANOL 320 MG/ML
50 INJECTION, SOLUTION INTRAVASCULAR ONCE
Status: COMPLETED | OUTPATIENT
Start: 2021-06-01 | End: 2021-06-01

## 2021-06-01 RX ORDER — SODIUM CHLORIDE 9 MG/ML
INJECTION, SOLUTION INTRAVENOUS CONTINUOUS
Status: DISCONTINUED | OUTPATIENT
Start: 2021-06-01 | End: 2021-06-03

## 2021-06-01 RX ADMIN — MILRINONE LACTATE IN DEXTROSE 1 MCG/KG/MIN: 200 INJECTION, SOLUTION INTRAVENOUS at 22:02

## 2021-06-01 RX ADMIN — INSULIN ASPART 1 UNITS: 100 INJECTION, SOLUTION INTRAVENOUS; SUBCUTANEOUS at 08:42

## 2021-06-01 RX ADMIN — MIDAZOLAM 0.5 MG: 1 INJECTION INTRAMUSCULAR; INTRAVENOUS at 14:52

## 2021-06-01 RX ADMIN — Medication 10 ML: at 19:51

## 2021-06-01 RX ADMIN — IPRATROPIUM BROMIDE AND ALBUTEROL SULFATE 3 ML: 2.5; .5 SOLUTION RESPIRATORY (INHALATION) at 00:47

## 2021-06-01 RX ADMIN — NIMODIPINE 60 MG: 30 SOLUTION ORAL at 08:42

## 2021-06-01 RX ADMIN — CEFEPIME 2 G: 2 INJECTION, POWDER, FOR SOLUTION INTRAVENOUS at 10:48

## 2021-06-01 RX ADMIN — Medication 10 ML: at 00:19

## 2021-06-01 RX ADMIN — NYSTATIN 500000 UNITS: 500000 SUSPENSION ORAL at 08:43

## 2021-06-01 RX ADMIN — POTASSIUM CHLORIDE 20 MEQ: 1.5 POWDER, FOR SOLUTION ORAL at 19:51

## 2021-06-01 RX ADMIN — IPRATROPIUM BROMIDE AND ALBUTEROL SULFATE 3 ML: 2.5; .5 SOLUTION RESPIRATORY (INHALATION) at 07:50

## 2021-06-01 RX ADMIN — NYSTATIN 500000 UNITS: 500000 SUSPENSION ORAL at 16:10

## 2021-06-01 RX ADMIN — MIDAZOLAM 0.5 MG: 1 INJECTION INTRAMUSCULAR; INTRAVENOUS at 14:43

## 2021-06-01 RX ADMIN — MILRINONE LACTATE IN DEXTROSE 1 MCG/KG/MIN: 200 INJECTION, SOLUTION INTRAVENOUS at 13:08

## 2021-06-01 RX ADMIN — Medication 1 PACKET: at 08:43

## 2021-06-01 RX ADMIN — NIMODIPINE 60 MG: 30 SOLUTION ORAL at 03:44

## 2021-06-01 RX ADMIN — FAMOTIDINE 20 MG: 20 TABLET ORAL at 19:51

## 2021-06-01 RX ADMIN — FAMOTIDINE 20 MG: 20 TABLET ORAL at 08:43

## 2021-06-01 RX ADMIN — HEPARIN SODIUM 4 BAG: 200 INJECTION, SOLUTION INTRAVENOUS at 14:40

## 2021-06-01 RX ADMIN — Medication 10 ML: at 12:09

## 2021-06-01 RX ADMIN — SODIUM CHLORIDE: 9 INJECTION, SOLUTION INTRAVENOUS at 16:06

## 2021-06-01 RX ADMIN — HEPARIN SODIUM 5000 UNITS: 5000 INJECTION, SOLUTION INTRAVENOUS; SUBCUTANEOUS at 00:20

## 2021-06-01 RX ADMIN — SODIUM CHLORIDE SOLN NEBU 3% 3 ML: 3 NEBU SOLN at 20:43

## 2021-06-01 RX ADMIN — NIMODIPINE 60 MG: 30 SOLUTION ORAL at 19:51

## 2021-06-01 RX ADMIN — FENTANYL CITRATE 25 MCG: 50 INJECTION, SOLUTION INTRAMUSCULAR; INTRAVENOUS at 14:52

## 2021-06-01 RX ADMIN — NYSTATIN 500000 UNITS: 500000 SUSPENSION ORAL at 12:09

## 2021-06-01 RX ADMIN — NIMODIPINE 60 MG: 30 SOLUTION ORAL at 00:19

## 2021-06-01 RX ADMIN — LIDOCAINE HYDROCHLORIDE 3 ML: 10 INJECTION, SOLUTION EPIDURAL; INFILTRATION; INTRACAUDAL; PERINEURAL at 14:38

## 2021-06-01 RX ADMIN — IPRATROPIUM BROMIDE AND ALBUTEROL SULFATE 3 ML: 2.5; .5 SOLUTION RESPIRATORY (INHALATION) at 15:44

## 2021-06-01 RX ADMIN — VANCOMYCIN HYDROCHLORIDE 1750 MG: 10 INJECTION, POWDER, LYOPHILIZED, FOR SOLUTION INTRAVENOUS at 18:10

## 2021-06-01 RX ADMIN — IPRATROPIUM BROMIDE AND ALBUTEROL SULFATE 3 ML: 2.5; .5 SOLUTION RESPIRATORY (INHALATION) at 20:43

## 2021-06-01 RX ADMIN — HEPARIN SODIUM 5000 UNITS: 5000 INJECTION, SOLUTION INTRAVENOUS; SUBCUTANEOUS at 16:10

## 2021-06-01 RX ADMIN — IODIXANOL 40 ML: 320 INJECTION, SOLUTION INTRAVASCULAR at 15:24

## 2021-06-01 RX ADMIN — CEFEPIME 2 G: 2 INJECTION, POWDER, FOR SOLUTION INTRAVENOUS at 02:58

## 2021-06-01 RX ADMIN — MILRINONE LACTATE IN DEXTROSE 1 MCG/KG/MIN: 200 INJECTION, SOLUTION INTRAVENOUS at 08:15

## 2021-06-01 RX ADMIN — VANCOMYCIN HYDROCHLORIDE 1750 MG: 10 INJECTION, POWDER, LYOPHILIZED, FOR SOLUTION INTRAVENOUS at 05:47

## 2021-06-01 RX ADMIN — CEFEPIME 2 G: 2 INJECTION, POWDER, FOR SOLUTION INTRAVENOUS at 17:34

## 2021-06-01 RX ADMIN — GUAIFENESIN 10 ML: 100 SOLUTION ORAL at 12:09

## 2021-06-01 RX ADMIN — Medication 10 ML: at 16:10

## 2021-06-01 RX ADMIN — DOCUSATE SODIUM 50 MG AND SENNOSIDES 8.6 MG 1 TABLET: 8.6; 5 TABLET, FILM COATED ORAL at 08:43

## 2021-06-01 RX ADMIN — HEPARIN SODIUM 5000 UNITS: 5000 INJECTION, SOLUTION INTRAVENOUS; SUBCUTANEOUS at 08:43

## 2021-06-01 RX ADMIN — Medication 10 ML: at 08:42

## 2021-06-01 RX ADMIN — POLYETHYLENE GLYCOL 3350 17 G: 17 POWDER, FOR SOLUTION ORAL at 08:43

## 2021-06-01 RX ADMIN — INSULIN ASPART 1 UNITS: 100 INJECTION, SOLUTION INTRAVENOUS; SUBCUTANEOUS at 00:20

## 2021-06-01 RX ADMIN — MILRINONE LACTATE IN DEXTROSE 1 MCG/KG/MIN: 200 INJECTION, SOLUTION INTRAVENOUS at 17:34

## 2021-06-01 RX ADMIN — ACETAMINOPHEN 650 MG: 325 TABLET, FILM COATED ORAL at 08:43

## 2021-06-01 RX ADMIN — Medication 1 PACKET: at 19:51

## 2021-06-01 RX ADMIN — FENTANYL CITRATE 25 MCG: 50 INJECTION, SOLUTION INTRAMUSCULAR; INTRAVENOUS at 14:43

## 2021-06-01 RX ADMIN — NIMODIPINE 60 MG: 30 SOLUTION ORAL at 12:09

## 2021-06-01 RX ADMIN — SODIUM CHLORIDE SOLN NEBU 3% 3 ML: 3 NEBU SOLN at 15:44

## 2021-06-01 RX ADMIN — MILRINONE LACTATE IN DEXTROSE 1 MCG/KG/MIN: 200 INJECTION, SOLUTION INTRAVENOUS at 03:36

## 2021-06-01 RX ADMIN — NYSTATIN 500000 UNITS: 500000 SUSPENSION ORAL at 19:51

## 2021-06-01 RX ADMIN — FENTANYL CITRATE 25 MCG: 50 INJECTION, SOLUTION INTRAMUSCULAR; INTRAVENOUS at 14:24

## 2021-06-01 RX ADMIN — MIDAZOLAM 0.5 MG: 1 INJECTION INTRAMUSCULAR; INTRAVENOUS at 14:25

## 2021-06-01 RX ADMIN — NIMODIPINE 60 MG: 30 SOLUTION ORAL at 16:10

## 2021-06-01 RX ADMIN — GUAIFENESIN 10 ML: 100 SOLUTION ORAL at 18:10

## 2021-06-01 RX ADMIN — Medication 10 ML: at 03:44

## 2021-06-01 RX ADMIN — POTASSIUM CHLORIDE 20 MEQ: 1.5 POWDER, FOR SOLUTION ORAL at 12:09

## 2021-06-01 RX ADMIN — INSULIN ASPART 1 UNITS: 100 INJECTION, SOLUTION INTRAVENOUS; SUBCUTANEOUS at 03:53

## 2021-06-01 ASSESSMENT — VISUAL ACUITY
OU: NOT TESTABLE

## 2021-06-01 ASSESSMENT — ACTIVITIES OF DAILY LIVING (ADL)
ADLS_ACUITY_SCORE: 26
ADLS_ACUITY_SCORE: 24

## 2021-06-01 ASSESSMENT — MIFFLIN-ST. JEOR: SCORE: 1525.13

## 2021-06-01 NOTE — PLAN OF CARE
OT 4A: Cancel and continue HOLDing.  Pt neuro exam fluctuating, limited active participation in therapy.  PT will follow for ROM and progression of mobility as able.  OT to monitor pt status and initiate once appropriate to engage in 2 therapies.

## 2021-06-01 NOTE — PROGRESS NOTES
Neurosurgery progress note    S: no acute events overnight; angio yesterday negative for vasospasm    O:  Temp:  [98.4  F (36.9  C)-100.7  F (38.2  C)] 100.7  F (38.2  C)  Pulse:  [] 86  Resp:  [15-27] 25  MAP:  [86 mmHg-160 mmHg] 95 mmHg  Arterial Line BP: (107-210)/() 136/73  FiO2 (%):  [30 %] 30 %  SpO2:  [88 %-100 %] 96 %    Exam:  Incision: c/d/i, drain and EVD in place  Intubated   Open eyes to strong stimulation   Localizes b/l UE L>R  Wiggles toes to command LLE  W/d RLE        Imaging:  No new imaging      ASSESSMENT: 70 year old man presented with AMS found to have aSAH with right supraclinoid aneurysm. mFS4 and HH5. GCS 5. S/p EVD placement, DCA,s/p aneurysm clipping on 5/16.     The following conditions are also present, complicating the patient's current management, as described in the Assessment and Plan:   mechanical ventilation on day of admission, intracerebral hematoma, brain compression, acute respiratory failure, coma, based on current GCS of 5 and cerebral aneurysm rupture with hunt sims scale 5 modified lowery 4     Hypokalemia   Severe malnutrition in the context of acute on chronic illness  Respiratory failure  Pneumonia     RECOMMENDATIONS:  - EVD: 10 open  - Q1hr neuro exam  - SBP<200  - HOB > 30 degrees  - wean sedation as possible  - follow-up clx  -- Cefepime/Vancomycin for PNA  -- CSF clx  - Continuous cardiac monitoring while in ICU   - Nimodipine (total 21 days)  - wean milrinone gtt  - daily TCDs, low threshold for CTA and CTP  - Glucose < 180  - Continue glucose checks  - Platelets > 100,000  - INR < 1.5  - Hemoglobin > 8  - DVT: SCDs while in bed  - Disposition: 4A  - PT/OT consulted        Samuel Nolasco MD, PhD  Neurosurgery Resident PGY-2    Please contact neurosurgery resident on call with questions.    Dial * * [phone], enter [phone] when prompted.

## 2021-06-01 NOTE — PROGRESS NOTES
Care Management Follow Up    Length of Stay (days): 17    Expected Discharge Date: 05/24/21     Concerns to be Addressed:     discharge planning   Patient plan of care discussed at interdisciplinary rounds: Yes    Anticipated Discharge Disposition: Transitional Care     Anticipated Discharge Services:  TCU  Anticipated Discharge DME:  TCU  Patient/family educated on Medicare website which has current facility and service quality ratings: yes  Education Provided on the Discharge Plan:  yes  Patient/Family in Agreement with the Plan:  yes    Referrals Placed by CM/SW:    None at this time- Pt spouse preference is:     [facility]  Assisted living facility in [place], [place]  Address: [address] [place] [id number]  Phone: [phone]    Private pay costs discussed: Not applicable    Additional Information:  SW received call from Pt spouse. Pt is not ready for discharge. It is unclear when Pt will be ready. Pt spouse preference is [facility]. Pt spouse asked for assistance if Pt needs long term care. SW posed long term care insurance to assist with coverage. Pt holds [facility] insurance. SW will continue to follow for psychosocial support, resources and advocate on behalf of the patient.    JOE Cmapos, LGSW  ICU    [facility]  Phone: [phone]  Pager: [phone]

## 2021-06-01 NOTE — PLAN OF CARE
Major Shift Events:  Neuros unchanged. Labetolol given x2 for SBP < 200. CMV setttings overnight, moderate creamy thick secretions. Medium loose BM x1. Condom cath in place. EVD @ 10. Milwilman @ 1. Wife Susy updated  Plan: possible extubatioin  For vital signs and complete assessments, please see documentation flowsheets.

## 2021-06-01 NOTE — PLAN OF CARE
Neuro: EVD @ 10 above EAM. ICP: 2-9. Output: 0-17. PERRLA. Spontaneous movement on L side. Withdraws on R side. Intermittently follows commands.  Very obtunded after IR procedure - not following any commands. Withdraws from pain in all extremities.    Cardiac: NSR. Rate: 70s-90s. MAP goal > 70. SBP goal < 200.    Pulm: Pressure support started at 0800. ETT 28 cm @ lips. PEEP of 5. PS of 7. Changed back to CMV during IR. 30% FiO2. RR 14. . PEEP 5. Thick, creamy yellow secretions.   Sputum culture obtained today.    GI: Condom cath in place and draining. Bladder scan d/t hx of retention. Per MD, do not straight cath unless > 500 mL.    : Incontinent. Loose, watery, brown BM x2. NJ @ 92 cm running at goal of 60 mL/hr w q2h 50 mL water flush. Feeding stopped @ 0800 d/t IR procedure.  Restarted feeding at 1730.    Skin: Small scab on R cheek near ETT smith. Crani incision. Oral thrush. Preventive sacral mepilex.  R angio femoral site. Small amount of drainage on tegaderm. Angioseal closure device. Quarter-sized hematoma noted.    LDAs: EVD. NJ. ETT. L triple lumen subclavian. R radial art line. PIV x3.    Plan: Continue q2h neuros and CMS q1h until 2300. Monitor ICP and EVD output. Update MD with daily changes.

## 2021-06-01 NOTE — PHARMACY-VANCOMYCIN DOSING SERVICE
Pharmacy Vancomycin Note  Date of Service 2021  Patient's  1950   70 year old male    Indication: Ventriculitis  Day of Therapy: 4  Current vancomycin regimen:  1750 mg IV q12h  Current vancomycin monitoring method: Trough (Method 2 = manual dose calculation)  Current vancomycin therapeutic monitoring goal: 15-20 mg/L    Current estimated CrCl = Estimated Creatinine Clearance: 146.6 mL/min (A) (based on SCr of 0.5 mg/dL (L)).    Creatinine for last 3 days  2021:  3:51 AM Creatinine 0.55 mg/dL  2021:  4:17 AM Creatinine 0.55 mg/dL  2021:  3:48 AM Creatinine 0.50 mg/dL    Recent Vancomycin Levels (past 3 days)  2021:  4:53 AM Vancomycin Level 11.7 mg/L  2021:  3:48 AM Vancomycin Level 18.7 mg/L    Vancomycin IV Administrations (past 72 hours)                   vancomycin (VANCOCIN) 1,750 mg in sodium chloride 0.9 % 500 mL intermittent infusion (mg) 1,750 mg New Bag 21 0547     1,750 mg New Bag 21 1821     1,750 mg New Bag  0545     1,750 mg New Bag 21 1728    vancomycin 1500 mg in 0.9% NaCl 250 ml intermittent infusion 1,500 mg (mg) 1,500 mg New Bag 21 0529     1,500 mg New Bag 21 1735                Nephrotoxins and other renal medications (From now, onward)    Start     Dose/Rate Route Frequency Ordered Stop    21 1800  vancomycin (VANCOCIN) 1,750 mg in sodium chloride 0.9 % 500 mL intermittent infusion      1,750 mg  over 120 Minutes Intravenous EVERY 12 HOURS 21 1600 21 2359    21 1700  norepinephrine (LEVOPHED) 16 mg in  mL infusion MAX CONC CENTRAL LINE      0.01-0.6 mcg/kg/min × 69.8 kg (Dosing Weight)  0.7-39.3 mL/hr  Intravenous CONTINUOUS 21 1643               Contrast Orders - past 72 hours (72h ago, onward)    Start     Dose/Rate Route Frequency Ordered Stop    21 0900  iopamidol (ISOVUE-370) solution 115 mL      115 mL Intravenous ONCE 21 0856 21 0945    21 1130  iopamidol  (ISOVUE-370) solution 115 mL      115 mL Intravenous ONCE 05/29/21 1114 05/29/21 1230          Interpretation of levels and current regimen:  Level of 18.7mg/L represents a 9.5hr post-dose level - estimated true trough is 15.9mg/L which is within goal range.     Has serum creatinine changed greater than 50% in last 72 hours: No    Urine output:  good urine output    Renal Function: Stable    Plan:  1. Continue current dose of 1750mg IV vancomycin q12h.   2. Vancomycin monitoring method: Trough (Method 2 = manual dose calculation)  3. Vancomycin therapeutic monitoring goal: 15-20 mg/L  4. Pharmacy will check vancomycin levels as appropriate in 2-4 days.  5. Serum creatinine levels will be ordered daily for the first week of therapy and at least twice weekly for subsequent weeks.    Penelope Acosta, PharmD, BCCCP

## 2021-06-01 NOTE — IR NOTE
Patient Name: Dayron Neri  Medical Record Number: [id number]  Today's Date: 6/1/2021    Procedure: diagnostic cerebral angiogram  Proceduralist: Dr ALVAREZ Brody, Dr JONATHAN Mancuso    Sedation start time: 1424  Sedation end time: 1523  Sedation medications administered: 1.5 mg versed, 75 mcg fentanyl  Total sedation time: 59 min  Sedation Notes: none    Procedure start time: 1427  Procedure end time: 1523    Report given to: Nilsa LARA   : none    Other Notes: Pt arrived to IR room 3 from . Consent reviewed, pt confirmed. Pt denies any questions or concerns regarding procedure. Pt positioned supine and monitored per protocol. Right groin accessed. Angio seal closure device deployed at 1520. Flat bedrest for 2 hours, ambulation time 1720. Site cleansed and dressed per protocol. Pt tolerated procedure without any noted complications. Pt transferred back to .

## 2021-06-01 NOTE — PROGRESS NOTES
[facility]     Endovascular Surgical Neuroradiology Pre-Procedure Note      HPI:  Dayron Neri is a 70 year old male with altered mental status found to have aSAH with right supraclinoid aneurysm. mFS4 and HH5. GCS 5. Formal angiogram performed and patient taken emergently for clipping 5/16. He has done well overall, however course complicated by multifocal strokes due to delayed cerebral ischemia which was complicated by his respiratory status making him hemodynamically unstable for angiogram. Respiratory status has improved and he was taken to angio on 5/28 and found to have very minimal bilateral BAYLEE vasospasm. We will perform formal angiogram today to assess for vasospasm given his waxing and waning exam.   Today is post bleed day 17.    Medical History:  Past Medical History:   Diagnosis Date     Displacement of lumbar intervertebral disc without myelopathy 1994    Lumbar disc rupture, no surgery       Surgical History:  Past Surgical History:   Procedure Laterality Date     CRANIOTOMY, EVACUATE HEMATOMA SUBDURAL, COMBINED Right 5/15/2021    Procedure: CRANIOTOMY, FOR SUBDURAL HEMATOMA EVACUATION;  Surgeon: Jamin Martinez MD;  Location: [facility] OR     CRANIOTOMY, REPAIR ANEURYSM, COMBINED Right 5/15/2021    Procedure: CRANIOTOMY, FOR ANEURYSM REPAIR;  Surgeon: Jamin Martinez MD;  Location: [facility] OR     NO HISTORY OF SURGERY         Family History:  Family History   Problem Relation Age of Onset     Hypertension Mother      Cancer Mother         colon cancer @ 85     Diabetes Father         type 2 diabetes     Cancer Father         lung cancer     Hypertension Sister      Psychotic Disorder Sister         depression     Hypertension Sister      Hypertension Sister      Hypertension Sister        Social History:  Social History     Socioeconomic History     Marital status:      Spouse name: Not on file     Number of children: Not on file     Years  of education: Not on file     Highest education level: Not on file   Occupational History     Not on file   Social Needs     Financial resource strain: Not on file     Food insecurity     Worry: Not on file     Inability: Not on file     Transportation needs     Medical: Not on file     Non-medical: Not on file   Tobacco Use     Smoking status: Never Smoker     Smokeless tobacco: Never Used   Substance and Sexual Activity     Alcohol use: Yes     Comment: moderate use     Drug use: No     Sexual activity: Not Currently   Lifestyle     Physical activity     Days per week: Not on file     Minutes per session: Not on file     Stress: Not on file   Relationships     Social connections     Talks on phone: Not on file     Gets together: Not on file     Attends Mandaen service: Not on file     Active member of club or organization: Not on file     Attends meetings of clubs or organizations: Not on file     Relationship status: Not on file     Intimate partner violence     Fear of current or ex partner: Not on file     Emotionally abused: Not on file     Physically abused: Not on file     Forced sexual activity: Not on file   Other Topics Concern      Service Not Asked     Blood Transfusions No     Caffeine Concern Not Asked     Comment: 3-4 cups per day     Occupational Exposure Not Asked     Hobby Hazards Not Asked     Sleep Concern Not Asked     Stress Concern Not Asked     Weight Concern Not Asked     Special Diet Not Asked     Back Care Not Asked     Exercise Yes     Comment: walk 1 mile per day when weather permits     Bike Helmet Not Asked     Seat Belt Yes     Self-Exams Not Asked     Parent/sibling w/ CABG, MI or angioplasty before 65F 55M? No   Social History Narrative     Not on file       Allergies:  No Known Allergies    Is there a contrast allergy?  No    Medications:  No medications prior to admission.   .    ROS:  Unable to obtain due to condition    PHYSICAL EXAMINATION  Vital Signs:  B/P: 166/85,   T: 99.5,  P: 77,  R: 26    Intubated, intermittently follows commands in bilateral uppers, moves left side more briskly, waxing and waning exam    LABS  (most recent Cr, BUN, GFR, PLT, INR, PTT within the past 7 days):  Recent Labs   Lab 06/01/21  0348   CR 0.50*   BUN 24   GFRESTIMATED >90   GFRESTBLACK >90   *              A/P:   Proceed with angiogram with possible vasospasm       PRE-PROCEDURE SEDATION ASSESSMENT     Pre-Procedure Sedation Assessment done at 0830.    Expected Level:  Moderate Sedation    Indication:  Sedation is required to allow for neurointerventional procedure.    Consent obtained from patient after discussing the risks, benefits and alternatives.     PO Intake:  Appropriately NPO for procedure    ASA Class:  Class 2 - MILD SYSTEMIC DISEASE, NO ACUTE PROBLEMS, NO FUNCTIONAL LIMITATIONS.    Mallampati:  Grade 2:  Soft palate, base of uvula, tonsillar pillars, and portion of posterior pharyngeal wall visible    History and physical reviewed and no updates needed. I have reviewed the lab findings, diagnostic data, medications, and the plan for sedation. I have determined this patient to be an appropriate candidate for the planned sedation and procedure and have reassessed the patient IMMEDIATELY PRIOR to sedation and procedure.

## 2021-06-01 NOTE — PROGRESS NOTES
[facility]     Endovascular Surgical Neuroradiology Post-Procedure Note    Pre-Procedure Diagnosis:  Concern for vasospasm  Post-Procedure Diagnosis: same    Procedure(s):   Diagnostic cervicocerebral angiography    Findings:    No vasospasm, no verapamil given  Slowed flow of distal right BAYLEE and early venous filling likely secondary to known BAYLEE infarct    Plan:   [] Flat 2 hours    Primary Surgeon:  Dr. Eric Mancuso    Fellow: Mary Grace    Prior to the start of the procedure and with procedural staff participation, I verbally confirmed: the patient s identity using two indicators, relevant allergies, that the procedure was appropriate and matched the consent or emergent situation, and that the correct equipment/implants were available. Immediately prior to starting the procedure I conducted the Time Out with the procedural staff and re-confirmed the patient s name, procedure, and site/side. (The Joint Commission universal protocol was followed.)  Yes    PRU value: Not applicable    Anesthesia:  Conscious Sedation  Medications:  Fentanyl, Midazolam  Puncture site:  Right Femoral Artery    Fluoroscopy time (minutes):  29  Radiation dose (mGy):  325    Contrast amount (mL):  40     Estimated blood loss (mL):  Minimal     Closure:  Device    Disposition:  Will be followed in hospital by the Neurosurgery team.        Sedation Post-Procedure Summary    Sedatives: Fentanyl and Midazolam (Versed)    Vital signs and pulse oximetry were monitored and remained stable throughout the procedure, and sedation was maintained until the procedure was complete.  The patient was monitored by staff until sedation discharge criteria were met.    Patient tolerance:  Patient tolerated the procedure well with no immediate complications.    Time of sedation in minutes:  60 minutes from beginning to end of physician one to one monitoring.    Francine Brody MD  Pager:  [phone]

## 2021-06-01 NOTE — PROGRESS NOTES
Neuroscience Intensive Care Progress Note  2021    REASON FOR CRITICAL CARE ADMISSION: Subarachnoid hemorrhage due to ruptured aneurysm     ADMITTING PHYSICIAN: Jamin Martinez MD     Date of Service (when I saw the patient): 21    Problem List  1.  Aneurysmal subarachnoid hemorrhage, HH 5, MF 4, bleed day 5/15  2.  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  Hydrocephalus status post EVD 5/15  4.  Hypertension  5.  Acute hypoxic respiratory failure  6.  Hypernatremia  7.  Hyperchloremia  8.  Leukocytosis  9.  Normocytic anemia    24 hour events:  Labetalol given x 2 for SBP > 200.    24 Hour Vital Signs Summary:  Temperatures:  Current - Temp: 97.8  F (36.6  C); Max - Temp  Av.6  F (37  C)  Min: 97.6  F (36.4  C)  Max: 99.5  F (37.5  C)  Respiration range: Resp  Av.8  Min: 16  Max: 29  Pulse range: Pulse  Av.1  Min: 73  Max: 87  Blood pressure range: Systolic (24hrs), Av , Min:166 , Max:166   ; Diastolic (24hrs), Av, Min:85, Max:85  Arterial Line BP: ()/() 172/130  MAP:  [81 mmHg-147 mmHg] 147 mmHg  BP - Mean:  [112] 112  Pulse oximetry range: SpO2  Av %  Min: 97 %  Max: 99 %    Ventilator Settings  Ventilation Mode: CMV/AC  (Continuous Mandatory Ventilation/ Assist Control)  FiO2 (%): 40 %  Rate Set (breaths/minute): 14 breaths/min  Tidal Volume Set (mL): 450 mL  PEEP (cm H2O): 5 cmH2O  Pressure Support (cm H2O): 7 cmH2O  Oxygen Concentration (%): 40 %  Resp: 26        Intake/Output Summary (Last 24 hours) at 2021 0750  Last data filed at 2021 0700  Gross per 24 hour   Intake 4300.9 ml   Output 3286 ml   Net 1014.9 ml       Current Medications:    ceFEPIme (MAXIPIME) IV  2 g Intravenous Q8H     famotidine  20 mg Oral or Feeding Tube BID     heparin ANTICOAGULANT  5,000 Units Subcutaneous Q8H     insulin aspart  1-12 Units Subcutaneous Q4H     ipratropium - albuterol 0.5 mg/2.5 mg/3 mL  3 mL Nebulization Q6H     niMODipine  60 mg  "Oral or Feeding Tube Q4H MARAH    And     sodium chloride 0.9 %  10 mL Oral or Feeding Tube Q4H MARAH     nystatin  500,000 Units Mouth/Throat 4x Daily     polyethylene glycol  17 g Oral or Feeding Tube Daily     potassium chloride  20 mEq Oral or Feeding Tube BID     protein modular  1 packet Per Feeding Tube TID     senna-docusate  1 tablet Oral or Feeding Tube BID    Or     senna-docusate  2 tablet Oral or Feeding Tube BID     vancomycin (VANCOCIN) IV  1,750 mg Intravenous Q12H       PRN Medications:  acetaminophen, bisacodyl, glucose **OR** dextrose **OR** glucagon, hydrALAZINE, labetalol, metoclopramide **OR** metoclopramide, naloxone **OR** naloxone **OR** naloxone **OR** naloxone, ondansetron **OR** ondansetron, oxyCODONE, prochlorperazine **OR** prochlorperazine **OR** prochlorperazine, QUEtiapine    Infusions:    dexmedetomidine Stopped (05/31/21 0724)     milrinone 1 mcg/kg/min (06/01/21 0400)     norepinephrine Stopped (05/31/21 0100)       No Known Allergies    Physical Examination:  Vitals: BP (!) 166/85   Pulse 87   Temp 97.8  F (36.6  C) (Axillary)   Resp 26   Ht 1.803 m (5' 11\")   Wt 73.3 kg (161 lb 9.6 oz)   SpO2 97%   BMI 22.54 kg/m    General: Adult male patient, lying in bed, NAD  HEENT: Normocephalic/Atraumatic, no icterus, Oral cavity/Oropharynx pink and moist  Cardiac: RRR  Chest: No SOB, pattern regular, unlabored, expansion symmetric, no retractions or use of accessory muscles, assisted mechanically   Abdomen: Soft, bowel sounds present  Extremities: Warm, no edema  Skin: No rash or lesion   Psych: Calm   Neuro:  Mental status: Drowsy, opens eyes to repeated stimulation, does not track examiner, does not follow commands, intubated.   Cranial nerves: PERRL, conjugate gaze, pupils +4 round and reactive, cough and gag intact with deep suctioning.   Motor: Normal bulk and tone. No abnormal movements. 1/5 strength in RUE, RLE, withdraws. 3/5 strength in LUE, LLE, purposeful " movement.   Sensory: Withdraws to noxious stimuli x 4.  Coordination: KWAME, deferred.  Gait: KWAME, deferred.    Labs/Studies:  Recent Labs   Lab Test 06/01/21  0348 05/31/21  0417 05/30/21  1618 05/30/21  0351    142  --  145*   POTASSIUM 4.0 3.5 3.5 3.9   CHLORIDE 109 110*  --  112*   CO2 27 26  --  29   ANIONGAP 6 7  --  4   * 160*  --  178*   BUN 24 28  --  27   CR 0.50* 0.55*  --  0.55*   RAGINI 9.4 9.3  --  8.8   WBC 14.9* 20.5*  --  29.7*   RBC 2.47* 2.53*  --  2.64*   HGB 7.5* 7.8*  --  8.0*   * 798*  --  852*       Recent Labs   Lab Test 05/15/21  2125 05/15/21  1222 05/15/21  0930   INR 1.20* 1.16* 1.07   PTT 30 29 27       Recent Labs   Lab 05/31/21  0705 05/30/21  1759 05/30/21  1618 05/28/21  0400   PH 7.47* 7.45 7.52* 7.43   PCO2 39 42 35 49*   PO2 153* 367* 85 84   HCO3 29* 30* 28 33*   O2PER 40.0 40.0 40.0 60.0         Imaging:  All imaging personally reviewed.     Assessment/Plan  #Subarachnoid hemorrhage (MF4, HH5), aneurysmal; PBD#17  #Ruptured R PCOM aneurysm  Cerebral angiogram on 5/15 with confirmation of rutpure  POD#17 for R craniotomy and clipping of PCOM aneurysm  - Neuro checks q2h  - Nimodipine 60 mg q4h  - Daily TCD  - HOB > 30 degrees  - Normonatremia  - Normothermia, Euglycemia    #Bilateral Delayed Cerebral Ischemia, likely from vasospasm   Angio 5/30 with mild vasospasm, s/p b/l ICA verapamil  CTA 5/30 with diffuse b/l anterior and posterior circulation vasospasm; now with Milrinone and Norepinephrine  - Milrinone 1 mcg/kg/min  - Norepinephrine for MAPs > 70; CPP 60-70 (Not infusing)              #IVH with subsequent hydrocephalus   #Brain compression with midline shift R>L  EVD placed 5/15  - EVD @ 10' open, ICP 2-7, drained 140 ml's in the last 24hrs     #Agitation  - Seroquel 12.5 mg BID PRN  - Precedex gtt   - Delirium precautions     #Analgesics  - Tylenol 650 mg q4h PRN   - Oxycodone 5 mg q4h PRN      CV:  #HTN  ECHO completed on 5/17; EF 60-65%  - SBP < 200  -  Holding PTA Lisinopril 10 mg daily   - Hydralazine and Labetalol PRN    See Neuro  - Milrinone 1 mcg/kg/min  - Norepinephrine for MAPs > 70; CPP 60-70     Pulm:  #Acute hypoxic respiratory failure  Re-intubated 5/19  - PST as able  - Pulmonary hygiene  - Duonebs q6h  - Start Guaifenesin q6h  - Start 3% saline nebs q6h     - Continuous pulse ox monitoring  - Maintain O2 saturations > 92%     GI/Nutrition:  #Severe malnutrition in the context of acute on chronic illness  - Protein modular 1 Pkt  - Multivitamins w/minerals     - Diet: TF's @ goal (60)   - NG   - Last BM--6/1  Bowel regimen: Scheduled Senna-docusate and Miralax daily      Renal:  #Hyperchloremia ~ resolved   - FWF 50 ml q2h   #Hypokalemia ~ resolved  - Potassium chloride 20 mEq BID     #Urinary retention  - Bladder scan PRN     - IV Fluids: None  - BMP daily  - Electrolyte replacement protocol in place     Endo:  #Hyperglycemia   - Follow glucose levels     Heme:  #Leukocytosis; 20.5->14.9  #Normocyctic anemia; 7.5  #Thrombocytosis; 844  - CBC daily     ID:  #Fever  #Aspiration pneumonia  #Probable Ventriculitis   #NGTD; gm stain with GNRs; 5/22 sputum with moderate growth pseudomonas, klebsiella which is pansensitive  - 5/28, 5/25, 5/19 pancultured for only heavy growth pseudomonas, Klebsiella oxytoca; Staphylococcus epidermidis; Heavy growth beta hemolytic Streptococcus constellatus   - Ceftriaxone (5/21-5/23) was switched to Zosyn (5/23-5/28)-->switch to Cefepime and Vancomycin for presumed ventriculitis given elevated WBC/RBC ratio from CSF cell lines  - Pan culture today, include CSF  - will continue Vanc/Cefepime x 14 days, (Day 5/14)  - Follow fever curve     PPX:  DVT Prophylaxis  - SCDs in place  - Heparin subcutaneous 5000 mg q8h  GI Prophylaxis  - Famotidine 20 mg BID      Lines/Tubes/Drains:  - PIV x 3  - L subclavian CVC/TL 5/15  - R arterial line 5/27  - NG  - EVD     Code Status: Full code     Dispo: ICU    The patient was seen and  discussed with the attending, Dr. Starr.    Audelia LAIRD, [facility]  NeuroCritical Care Nurse Practitioner  Pager: [phone]  Ascom: *[phone] available M- [phone] to [phone]

## 2021-06-02 ENCOUNTER — APPOINTMENT (OUTPATIENT)
Dept: ULTRASOUND IMAGING | Facility: CLINIC | Age: 71
DRG: 003 | End: 2021-06-02
Attending: STUDENT IN AN ORGANIZED HEALTH CARE EDUCATION/TRAINING PROGRAM
Payer: COMMERCIAL

## 2021-06-02 ENCOUNTER — APPOINTMENT (OUTPATIENT)
Dept: PHYSICAL THERAPY | Facility: CLINIC | Age: 71
DRG: 003 | End: 2021-06-02
Attending: NEUROLOGICAL SURGERY
Payer: COMMERCIAL

## 2021-06-02 PROBLEM — Z99.11 VENTILATOR DEPENDENCE (H): Status: ACTIVE | Noted: 2021-05-15

## 2021-06-02 LAB
ABO + RH BLD: NORMAL
ABO + RH BLD: NORMAL
ALBUMIN SERPL-MCNC: 1.7 G/DL (ref 3.4–5)
ALP SERPL-CCNC: 131 U/L (ref 40–150)
ALT SERPL W P-5'-P-CCNC: 64 U/L (ref 0–70)
ANION GAP SERPL CALCULATED.3IONS-SCNC: 5 MMOL/L (ref 3–14)
AST SERPL W P-5'-P-CCNC: 34 U/L (ref 0–45)
BACTERIA SPEC CULT: NO GROWTH
BILIRUB DIRECT SERPL-MCNC: <0.1 MG/DL (ref 0–0.2)
BILIRUB SERPL-MCNC: 0.2 MG/DL (ref 0.2–1.3)
BLD GP AB SCN SERPL QL: NORMAL
BLOOD BANK CMNT PATIENT-IMP: NORMAL
BUN SERPL-MCNC: 22 MG/DL (ref 7–30)
CALCIUM SERPL-MCNC: 8.8 MG/DL (ref 8.5–10.1)
CHLORIDE SERPL-SCNC: 106 MMOL/L (ref 94–109)
CO2 SERPL-SCNC: 26 MMOL/L (ref 20–32)
CREAT SERPL-MCNC: 0.51 MG/DL (ref 0.66–1.25)
ERYTHROCYTE [DISTWIDTH] IN BLOOD BY AUTOMATED COUNT: 13.1 % (ref 10–15)
GFR SERPL CREATININE-BSD FRML MDRD: >90 ML/MIN/{1.73_M2}
GLUCOSE BLDC GLUCOMTR-MCNC: 138 MG/DL (ref 70–99)
GLUCOSE BLDC GLUCOMTR-MCNC: 147 MG/DL (ref 70–99)
GLUCOSE BLDC GLUCOMTR-MCNC: 153 MG/DL (ref 70–99)
GLUCOSE BLDC GLUCOMTR-MCNC: 158 MG/DL (ref 70–99)
GLUCOSE BLDC GLUCOMTR-MCNC: 162 MG/DL (ref 70–99)
GLUCOSE SERPL-MCNC: 164 MG/DL (ref 70–99)
HCT VFR BLD AUTO: 24.9 % (ref 40–53)
HGB BLD-MCNC: 8 G/DL (ref 13.3–17.7)
MAGNESIUM SERPL-MCNC: 2.3 MG/DL (ref 1.6–2.3)
MCH RBC QN AUTO: 30.8 PG (ref 26.5–33)
MCHC RBC AUTO-ENTMCNC: 32.1 G/DL (ref 31.5–36.5)
MCV RBC AUTO: 96 FL (ref 78–100)
PHOSPHATE SERPL-MCNC: 3.3 MG/DL (ref 2.5–4.5)
PLATELET # BLD AUTO: 853 10E9/L (ref 150–450)
POTASSIUM SERPL-SCNC: 3.7 MMOL/L (ref 3.4–5.3)
PROT SERPL-MCNC: 7 G/DL (ref 6.8–8.8)
RBC # BLD AUTO: 2.6 10E12/L (ref 4.4–5.9)
SODIUM SERPL-SCNC: 137 MMOL/L (ref 133–144)
SPECIMEN EXP DATE BLD: NORMAL
SPECIMEN SOURCE: NORMAL
WBC # BLD AUTO: 15.4 10E9/L (ref 4–11)

## 2021-06-02 PROCEDURE — 999N001017 HC STATISTIC GLUCOSE BY METER IP

## 2021-06-02 PROCEDURE — 250N000013 HC RX MED GY IP 250 OP 250 PS 637: Performed by: STUDENT IN AN ORGANIZED HEALTH CARE EDUCATION/TRAINING PROGRAM

## 2021-06-02 PROCEDURE — 97530 THERAPEUTIC ACTIVITIES: CPT | Mod: GP

## 2021-06-02 PROCEDURE — 250N000013 HC RX MED GY IP 250 OP 250 PS 637: Performed by: NEUROLOGICAL SURGERY

## 2021-06-02 PROCEDURE — 250N000011 HC RX IP 250 OP 636: Performed by: STUDENT IN AN ORGANIZED HEALTH CARE EDUCATION/TRAINING PROGRAM

## 2021-06-02 PROCEDURE — 250N000009 HC RX 250: Performed by: STUDENT IN AN ORGANIZED HEALTH CARE EDUCATION/TRAINING PROGRAM

## 2021-06-02 PROCEDURE — 97110 THERAPEUTIC EXERCISES: CPT | Mod: GP

## 2021-06-02 PROCEDURE — 80048 BASIC METABOLIC PNL TOTAL CA: CPT | Performed by: STUDENT IN AN ORGANIZED HEALTH CARE EDUCATION/TRAINING PROGRAM

## 2021-06-02 PROCEDURE — 84100 ASSAY OF PHOSPHORUS: CPT | Performed by: STUDENT IN AN ORGANIZED HEALTH CARE EDUCATION/TRAINING PROGRAM

## 2021-06-02 PROCEDURE — 85027 COMPLETE CBC AUTOMATED: CPT | Performed by: STUDENT IN AN ORGANIZED HEALTH CARE EDUCATION/TRAINING PROGRAM

## 2021-06-02 PROCEDURE — 86850 RBC ANTIBODY SCREEN: CPT | Performed by: NEUROLOGICAL SURGERY

## 2021-06-02 PROCEDURE — 250N000009 HC RX 250: Performed by: PSYCHIATRY & NEUROLOGY

## 2021-06-02 PROCEDURE — 250N000012 HC RX MED GY IP 250 OP 636 PS 637: Performed by: NURSE PRACTITIONER

## 2021-06-02 PROCEDURE — 94003 VENT MGMT INPAT SUBQ DAY: CPT

## 2021-06-02 PROCEDURE — 83735 ASSAY OF MAGNESIUM: CPT | Performed by: STUDENT IN AN ORGANIZED HEALTH CARE EDUCATION/TRAINING PROGRAM

## 2021-06-02 PROCEDURE — 94664 DEMO&/EVAL PT USE INHALER: CPT

## 2021-06-02 PROCEDURE — 86900 BLOOD TYPING SEROLOGIC ABO: CPT | Performed by: NEUROLOGICAL SURGERY

## 2021-06-02 PROCEDURE — 86901 BLOOD TYPING SEROLOGIC RH(D): CPT | Performed by: NEUROLOGICAL SURGERY

## 2021-06-02 PROCEDURE — 999N000157 HC STATISTIC RCP TIME EA 10 MIN

## 2021-06-02 PROCEDURE — 94640 AIRWAY INHALATION TREATMENT: CPT | Mod: 76

## 2021-06-02 PROCEDURE — 999N000076 HC STATISTIC ICP MONITORING

## 2021-06-02 PROCEDURE — 80076 HEPATIC FUNCTION PANEL: CPT | Performed by: STUDENT IN AN ORGANIZED HEALTH CARE EDUCATION/TRAINING PROGRAM

## 2021-06-02 PROCEDURE — 250N000011 HC RX IP 250 OP 636: Performed by: NEUROLOGICAL SURGERY

## 2021-06-02 PROCEDURE — 250N000013 HC RX MED GY IP 250 OP 250 PS 637: Performed by: NURSE PRACTITIONER

## 2021-06-02 PROCEDURE — 200N000002 HC R&B ICU UMMC

## 2021-06-02 PROCEDURE — 93886 INTRACRANIAL COMPLETE STUDY: CPT

## 2021-06-02 PROCEDURE — 999N000015 HC STATISTIC ARTERIAL MONITORING DAILY

## 2021-06-02 PROCEDURE — 99291 CRITICAL CARE FIRST HOUR: CPT | Mod: GC | Performed by: PSYCHIATRY & NEUROLOGY

## 2021-06-02 PROCEDURE — 258N000003 HC RX IP 258 OP 636: Performed by: NEUROLOGICAL SURGERY

## 2021-06-02 PROCEDURE — 258N000003 HC RX IP 258 OP 636: Performed by: STUDENT IN AN ORGANIZED HEALTH CARE EDUCATION/TRAINING PROGRAM

## 2021-06-02 PROCEDURE — 93886 INTRACRANIAL COMPLETE STUDY: CPT | Mod: 26 | Performed by: RADIOLOGY

## 2021-06-02 PROCEDURE — 94640 AIRWAY INHALATION TREATMENT: CPT

## 2021-06-02 PROCEDURE — 250N000013 HC RX MED GY IP 250 OP 250 PS 637: Performed by: PSYCHIATRY & NEUROLOGY

## 2021-06-02 RX ORDER — ACETAMINOPHEN 325 MG/1
650 TABLET ORAL EVERY 6 HOURS PRN
Status: DISCONTINUED | OUTPATIENT
Start: 2021-06-02 | End: 2021-06-03

## 2021-06-02 RX ORDER — POTASSIUM CHLORIDE 1.5 G/1.58G
20 POWDER, FOR SOLUTION ORAL ONCE
Status: COMPLETED | OUTPATIENT
Start: 2021-06-02 | End: 2021-06-02

## 2021-06-02 RX ADMIN — VANCOMYCIN HYDROCHLORIDE 1750 MG: 10 INJECTION, POWDER, LYOPHILIZED, FOR SOLUTION INTRAVENOUS at 20:03

## 2021-06-02 RX ADMIN — GUAIFENESIN 10 ML: 100 SOLUTION ORAL at 00:32

## 2021-06-02 RX ADMIN — INSULIN ASPART 2 UNITS: 100 INJECTION, SOLUTION INTRAVENOUS; SUBCUTANEOUS at 03:49

## 2021-06-02 RX ADMIN — SODIUM CHLORIDE SOLN NEBU 3% 3 ML: 3 NEBU SOLN at 03:23

## 2021-06-02 RX ADMIN — GUAIFENESIN 10 ML: 100 SOLUTION ORAL at 05:55

## 2021-06-02 RX ADMIN — NYSTATIN 500000 UNITS: 500000 SUSPENSION ORAL at 08:10

## 2021-06-02 RX ADMIN — MILRINONE LACTATE IN DEXTROSE 1 MCG/KG/MIN: 200 INJECTION, SOLUTION INTRAVENOUS at 06:57

## 2021-06-02 RX ADMIN — POTASSIUM CHLORIDE 20 MEQ: 1.5 POWDER, FOR SOLUTION ORAL at 20:02

## 2021-06-02 RX ADMIN — MILRINONE LACTATE IN DEXTROSE 0.75 MCG/KG/MIN: 200 INJECTION, SOLUTION INTRAVENOUS at 18:18

## 2021-06-02 RX ADMIN — POTASSIUM CHLORIDE 20 MEQ: 1.5 POWDER, FOR SOLUTION ORAL at 06:00

## 2021-06-02 RX ADMIN — Medication 10 ML: at 23:50

## 2021-06-02 RX ADMIN — NIMODIPINE 60 MG: 30 SOLUTION ORAL at 15:14

## 2021-06-02 RX ADMIN — Medication 10 ML: at 11:38

## 2021-06-02 RX ADMIN — HEPARIN SODIUM 5000 UNITS: 5000 INJECTION, SOLUTION INTRAVENOUS; SUBCUTANEOUS at 23:49

## 2021-06-02 RX ADMIN — SODIUM CHLORIDE SOLN NEBU 3% 3 ML: 3 NEBU SOLN at 19:41

## 2021-06-02 RX ADMIN — GUAIFENESIN 10 ML: 100 SOLUTION ORAL at 23:49

## 2021-06-02 RX ADMIN — INSULIN ASPART 1 UNITS: 100 INJECTION, SOLUTION INTRAVENOUS; SUBCUTANEOUS at 11:49

## 2021-06-02 RX ADMIN — CEFEPIME 2 G: 2 INJECTION, POWDER, FOR SOLUTION INTRAVENOUS at 10:58

## 2021-06-02 RX ADMIN — NIMODIPINE 60 MG: 30 SOLUTION ORAL at 11:38

## 2021-06-02 RX ADMIN — Medication 10 ML: at 00:32

## 2021-06-02 RX ADMIN — VANCOMYCIN HYDROCHLORIDE 1750 MG: 10 INJECTION, POWDER, LYOPHILIZED, FOR SOLUTION INTRAVENOUS at 05:55

## 2021-06-02 RX ADMIN — HEPARIN SODIUM 5000 UNITS: 5000 INJECTION, SOLUTION INTRAVENOUS; SUBCUTANEOUS at 08:12

## 2021-06-02 RX ADMIN — NIMODIPINE 60 MG: 30 SOLUTION ORAL at 23:49

## 2021-06-02 RX ADMIN — HEPARIN SODIUM 5000 UNITS: 5000 INJECTION, SOLUTION INTRAVENOUS; SUBCUTANEOUS at 00:32

## 2021-06-02 RX ADMIN — Medication 1 PACKET: at 08:14

## 2021-06-02 RX ADMIN — IPRATROPIUM BROMIDE AND ALBUTEROL SULFATE 3 ML: 2.5; .5 SOLUTION RESPIRATORY (INHALATION) at 19:41

## 2021-06-02 RX ADMIN — Medication 10 ML: at 03:46

## 2021-06-02 RX ADMIN — Medication 1 PACKET: at 20:05

## 2021-06-02 RX ADMIN — NYSTATIN 500000 UNITS: 500000 SUSPENSION ORAL at 20:02

## 2021-06-02 RX ADMIN — Medication 10 ML: at 15:14

## 2021-06-02 RX ADMIN — FAMOTIDINE 20 MG: 20 TABLET ORAL at 20:02

## 2021-06-02 RX ADMIN — MILRINONE LACTATE IN DEXTROSE 1 MCG/KG/MIN: 200 INJECTION, SOLUTION INTRAVENOUS at 02:36

## 2021-06-02 RX ADMIN — ACETAMINOPHEN 325 MG: 325 TABLET, FILM COATED ORAL at 08:12

## 2021-06-02 RX ADMIN — NIMODIPINE 60 MG: 30 SOLUTION ORAL at 08:13

## 2021-06-02 RX ADMIN — NIMODIPINE 60 MG: 30 SOLUTION ORAL at 20:02

## 2021-06-02 RX ADMIN — NYSTATIN 500000 UNITS: 500000 SUSPENSION ORAL at 15:09

## 2021-06-02 RX ADMIN — Medication 1 PACKET: at 14:10

## 2021-06-02 RX ADMIN — IPRATROPIUM BROMIDE AND ALBUTEROL SULFATE 3 ML: 2.5; .5 SOLUTION RESPIRATORY (INHALATION) at 09:17

## 2021-06-02 RX ADMIN — SODIUM CHLORIDE SOLN NEBU 3% 3 ML: 3 NEBU SOLN at 09:17

## 2021-06-02 RX ADMIN — IPRATROPIUM BROMIDE AND ALBUTEROL SULFATE 3 ML: 2.5; .5 SOLUTION RESPIRATORY (INHALATION) at 13:51

## 2021-06-02 RX ADMIN — MILRINONE LACTATE IN DEXTROSE 0.75 MCG/KG/MIN: 200 INJECTION, SOLUTION INTRAVENOUS at 11:46

## 2021-06-02 RX ADMIN — CEFEPIME 2 G: 2 INJECTION, POWDER, FOR SOLUTION INTRAVENOUS at 02:06

## 2021-06-02 RX ADMIN — GUAIFENESIN 10 ML: 100 SOLUTION ORAL at 11:38

## 2021-06-02 RX ADMIN — Medication 10 ML: at 08:12

## 2021-06-02 RX ADMIN — INSULIN ASPART 1 UNITS: 100 INJECTION, SOLUTION INTRAVENOUS; SUBCUTANEOUS at 00:32

## 2021-06-02 RX ADMIN — HEPARIN SODIUM 5000 UNITS: 5000 INJECTION, SOLUTION INTRAVENOUS; SUBCUTANEOUS at 15:11

## 2021-06-02 RX ADMIN — POTASSIUM CHLORIDE 20 MEQ: 1.5 POWDER, FOR SOLUTION ORAL at 11:38

## 2021-06-02 RX ADMIN — IPRATROPIUM BROMIDE AND ALBUTEROL SULFATE 3 ML: 2.5; .5 SOLUTION RESPIRATORY (INHALATION) at 03:23

## 2021-06-02 RX ADMIN — NYSTATIN 500000 UNITS: 500000 SUSPENSION ORAL at 11:38

## 2021-06-02 RX ADMIN — NIMODIPINE 60 MG: 30 SOLUTION ORAL at 00:32

## 2021-06-02 RX ADMIN — Medication 10 ML: at 20:02

## 2021-06-02 RX ADMIN — SODIUM CHLORIDE SOLN NEBU 3% 3 ML: 3 NEBU SOLN at 13:50

## 2021-06-02 RX ADMIN — CEFEPIME 2 G: 2 INJECTION, POWDER, FOR SOLUTION INTRAVENOUS at 17:16

## 2021-06-02 RX ADMIN — INSULIN ASPART 1 UNITS: 100 INJECTION, SOLUTION INTRAVENOUS; SUBCUTANEOUS at 08:31

## 2021-06-02 RX ADMIN — INSULIN ASPART 1 UNITS: 100 INJECTION, SOLUTION INTRAVENOUS; SUBCUTANEOUS at 20:11

## 2021-06-02 RX ADMIN — GUAIFENESIN 10 ML: 100 SOLUTION ORAL at 17:17

## 2021-06-02 RX ADMIN — FAMOTIDINE 20 MG: 20 TABLET ORAL at 08:12

## 2021-06-02 RX ADMIN — NIMODIPINE 60 MG: 30 SOLUTION ORAL at 03:46

## 2021-06-02 RX ADMIN — INSULIN ASPART 1 UNITS: 100 INJECTION, SOLUTION INTRAVENOUS; SUBCUTANEOUS at 23:48

## 2021-06-02 RX ADMIN — DOXAZOSIN 1 MG: 1 TABLET ORAL at 08:12

## 2021-06-02 ASSESSMENT — ACTIVITIES OF DAILY LIVING (ADL)
ADLS_ACUITY_SCORE: 26

## 2021-06-02 ASSESSMENT — MIFFLIN-ST. JEOR: SCORE: 1532.13

## 2021-06-02 NOTE — PROGRESS NOTES
Neurosurgery progress note    S: no acute events overnight; successfully pressure supported overnight; TCDs improving    O:  Temp:  [98  F (36.7  C)-98.6  F (37  C)] 98.5  F (36.9  C)  Pulse:  [64-87] 71  Resp:  [19-32] 23  BP: ()/() 155/89  MAP:  [72 mmHg-131 mmHg] 105 mmHg  Arterial Line BP: ()/() 165/75  FiO2 (%):  [30 %] 30 %  SpO2:  [90 %-100 %] 97 %    Exam:  Incision: c/d/i, drain and EVD in place  Intubated   Open eyes spontaneously  Localizes b/l UE L>R  Wiggles toes to command LLE  W/d RLE      Imaging:  No new imaging      ASSESSMENT: 70 year old man presented with AMS found to have aSAH with right supraclinoid aneurysm. mFS4 and HH5. GCS 5. S/p EVD placement, DCA,s/p aneurysm clipping on 5/16.     The following conditions are also present, complicating the patient's current management, as described in the Assessment and Plan:   mechanical ventilation on day of admission, intracerebral hematoma, brain compression, acute respiratory failure, coma, based on current GCS of 5 and cerebral aneurysm rupture with hunt sims scale 5 modified lowery 4     Hypokalemia   Severe malnutrition in the context of acute on chronic illness  Respiratory failure  Pneumonia     RECOMMENDATIONS:  - EVD: 10 open  - Q1hr neuro exam  - SBP<200  - HOB > 30 degrees  - wean sedation as possible  - wean to extubate  - follow-up clx  -- Cefepime/Vancomycin for PNA  -- CSF clx  - Continuous cardiac monitoring while in ICU   - Nimodipine (total 21 days)  - wean milrinone gtt  - daily TCDs, low threshold for CTA and CTP  - Glucose < 180  - Continue glucose checks  - Platelets > 100,000  - INR < 1.5  - Hemoglobin > 8  - DVT: SCDs while in bed  - Disposition: 4A  - PT/OT consulted        Samuel Nolasco MD, PhD  Neurosurgery Resident PGY-2    Please contact neurosurgery resident on call with questions.    Dial * [phone], enter [phone] when prompted.

## 2021-06-02 NOTE — PLAN OF CARE
Major Shift Events:  Pt obtunded and withdrawing from pain in all 4 extremities at beginning of shift, this AM had bilat weak hand , and moving LUE spontaneously. (team notified/aware). EVD @ 10. R groin site with unchanged quarter sized hematoma. CMV settings @ 30% FiO2. Frequent suctioning with moderate thick creamy secretions. Large loose BM x1. Condom cath in place with good output. K replaced this AM. Afebrile. Wife Susy updated  Plan: Monitor neuro status, trend cultures.   For vital signs and complete assessments, please see documentation flowsheets.

## 2021-06-02 NOTE — PROGRESS NOTES
Neuroscience Intensive Care Progress Note  2021    REASON FOR CRITICAL CARE ADMISSION: Subarachnoid hemorrhage due to ruptured aneurysm     ADMITTING PHYSICIAN: Jamin Martinez MD      Date of Service (when I saw the patient): 21    Problem List  1.  Aneurysmal subarachnoid hemorrhage, HH 5, MF 4, bleed day 5/15  2.  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  Hydrocephalus status post EVD 5/15  4.  Hypertension  5.  Acute hypoxic respiratory failure  6.  Hypernatremia  7.  Hyperchloremia  8.  Leukocytosis  9.  Normocytic anemia    24 hour events:  Neuro exam continues to wax/wane. No acute events overnight.    24 Hour Vital Signs Summary:  Temperatures:  Current - Temp: 99  F (37.2  C); Max - Temp  Av.6  F (37.6  C)  Min: 98.4  F (36.9  C)  Max: 101.5  F (38.6  C)  Respiration range: Resp  Av.3  Min: 15  Max: 27  Pulse range: Pulse  Av.5  Min: 65  Max: 101  Arterial Line BP: (107-210)/() 139/77  MAP:  [86 mmHg-160 mmHg] 97 mmHg  Pulse oximetry range: SpO2  Av.5 %  Min: 88 %  Max: 100 %    Ventilator Settings  Ventilation Mode: CMV/AC  (Continuous Mandatory Ventilation/ Assist Control)  FiO2 (%): 30 %  Rate Set (breaths/minute): 14 breaths/min  Tidal Volume Set (mL): 450 mL  PEEP (cm H2O): 5 cmH2O  Pressure Support (cm H2O): 7 cmH2O  Oxygen Concentration (%): 30 %  Resp: 25        Intake/Output Summary (Last 24 hours) at 2021 0745  Last data filed at 2021 0700  Gross per 24 hour   Intake 4027.9 ml   Output 3988 ml   Net 39.9 ml       Current Medications:    ceFEPIme (MAXIPIME) IV  2 g Intravenous Q8H     doxazosin  1 mg Oral or Feeding Tube Daily     famotidine  20 mg Oral or Feeding Tube BID     guaiFENesin  10 mL Oral or Feeding Tube Q6H     heparin ANTICOAGULANT  5,000 Units Subcutaneous Q8H     insulin aspart  1-12 Units Subcutaneous Q4H     ipratropium - albuterol 0.5 mg/2.5 mg/3 mL  3 mL Nebulization Q6H     niMODipine  60 mg Oral or Feeding  "Tube Q4H MARAH    And     sodium chloride 0.9 %  10 mL Oral or Feeding Tube Q4H MARAH     nystatin  500,000 Units Mouth/Throat 4x Daily     polyethylene glycol  17 g Oral or Feeding Tube Daily     potassium chloride  20 mEq Oral or Feeding Tube BID     protein modular  1 packet Per Feeding Tube TID     senna-docusate  1 tablet Oral or Feeding Tube BID    Or     senna-docusate  2 tablet Oral or Feeding Tube BID     sodium chloride  3 mL Nebulization Q6H     vancomycin (VANCOCIN) IV  1,750 mg Intravenous Q12H       PRN Medications:  acetaminophen, bisacodyl, glucose **OR** dextrose **OR** glucagon, fentaNYL, hydrALAZINE, labetalol, lidocaine 4%, lidocaine (buffered or not buffered), - MEDICATION INSTRUCTIONS -, metoclopramide **OR** metoclopramide, naloxone **OR** naloxone **OR** naloxone **OR** naloxone, ondansetron **OR** ondansetron, oxyCODONE, prochlorperazine **OR** prochlorperazine **OR** prochlorperazine, sodium chloride (PF), sodium chloride (PF)    Infusions:    dexmedetomidine Stopped (05/31/21 0724)     - MEDICATION INSTRUCTIONS -       milrinone 1 mcg/kg/min (06/02/21 0657)     sodium chloride Stopped (06/01/21 2000)       No Known Allergies    Physical Examination:  Vitals: BP (!) 166/85   Pulse 84   Temp 99  F (37.2  C) (Axillary)   Resp 25   Ht 1.803 m (5' 11\")   Wt 74 kg (163 lb 2.3 oz)   SpO2 96%   BMI 22.75 kg/m    General: Adult male patient, lying in bed, NAD  HEENT: Normocephalic/Atraumatic, no icterus, Oral cavity/Oropharynx pink and moist  Cardiac: RRR  Chest: No SOB, pattern regular, unlabored, expansion symmetric, no retractions or use of accessory muscles, assisted mechanically   Abdomen: Soft, bowel sounds present  Extremities: Warm, no edema  Skin: No rash or lesion   Psych: Calm   Neuro:  Mental status: Drowsy, opens eyes to repeated stimulation, does not track examiner, inconsistently follows commands, intubated.   Cranial nerves: PERRL, conjugate gaze, pupils +4 round and reactive, " cough and gag intact with deep suctioning.   Motor: Normal bulk and tone. No abnormal movements. 1/5 strength in RUE, RLE, withdraws. 3/5 strength in LUE, LLE, purposeful movement.   Sensory: Withdraws to noxious stimuli x 4.  Coordination: KWAME, deferred.  Gait: KWAME, deferred.    Labs/Studies:  Recent Labs   Lab Test 06/02/21  0337 06/01/21  0348 05/31/21  0417    142 142   POTASSIUM 3.7 4.0 3.5   CHLORIDE 106 109 110*   CO2 26 27 26   ANIONGAP 5 6 7   * 168* 160*   BUN 22 24 28   CR 0.51* 0.50* 0.55*   RAGINI 8.8 9.4 9.3   WBC 15.4* 14.9* 20.5*   RBC 2.60* 2.47* 2.53*   HGB 8.0* 7.5* 7.8*   * 844* 798*       Recent Labs   Lab Test 05/15/21  2125 05/15/21  1222 05/15/21  0930   INR 1.20* 1.16* 1.07   PTT 30 29 27       Recent Labs   Lab 05/31/21  0705 05/30/21  1759 05/30/21  1618 05/28/21  0400   PH 7.47* 7.45 7.52* 7.43   PCO2 39 42 35 49*   PO2 153* 367* 85 84   HCO3 29* 30* 28 33*   O2PER 40.0 40.0 40.0 60.0         Imaging:  All imaging personally reviewed.    Assessment/Plan  Mr Neri is a 70-year-old gentleman currently admitted on 5/15/21 with subarachnoid hemorrhage (MF4, HH5) and brain compression with midline shift R>L 2/2 ruptured right P-comm aneurysm. Now S/p right P-comm aneurysm clipping on 5/15. Hospital course c/b acute respiratory failure with concern for aspiration PNA, S/p re-intubation on 5/19, and vasospasms S/p b/l ICA verapamil on 5/30.       Plan  Neuro:  #Subarachnoid hemorrhage (MF4, HH5), aneurysmal; PBD#18  #Ruptured R PCOM aneurysm  Cerebral angiogram on 5/15 with confirmation of rutpure  POD#18 for R craniotomy and clipping of PCOM aneurysm  - Neuro checks q2h  - Nimodipine 60 mg q4h  - Daily TCD x 21 days per NSGY  - HOB > 30 degrees  - Normonatremia  - Normothermia, Euglycemia     #Bilateral Delayed Cerebral Ischemia, likely from vasospasm   Angio 5/30 with mild vasospasm, s/p b/l ICA verapamil  CTA 5/30 with diffuse b/l anterior and posterior circulation  vasospasm; now with Milrinone and Norepinephrine  - Decrease Milrinone to .75 mcg/kg/min  - Norepinephrine for MAPs > 70; CPP 60-70 (Not infusing)              #IVH with subsequent hydrocephalus   #Brain compression with midline shift R>L  EVD placed 5/15  - EVD @ 10' open, ICP 4-7, drained 189 ml's in the last 24hrs    #Agitation  - Seroquel 12.5 mg BID PRN  - Precedex gtt   - Delirium precautions     #Analgesics  - Increase Tylenol to 650 mg q4h PRN (add LFT's to morning labs)   - Oxycodone 5 mg q4h PRN      CV:  #HTN  ECHO completed on 5/17; EF 60-65%  - SBP < 200  - Holding PTA Lisinopril 10 mg daily   - Hydralazine and Labetalol PRN     See Neuro   - Milrinone .75 mcg/kg/min  - Norepinephrine for MAPs > 70; CPP 60-70     Pulm:  #Acute hypoxic respiratory failure  #Thick secretions   Re-intubated 5/19  - PST as able  - Pulmonary hygiene  - Duonebs q6h  - Guaifenesin q6h  - 3% saline nebs q6h     - Continuous pulse ox monitoring  - Maintain O2 saturations > 92%     GI/Nutrition:  #Severe malnutrition in the context of acute on chronic illness  - Protein modular 1 Pkt  - Multivitamins w/minerals     - Diet: TF's @ goal (60)   - NG   - Last BM--6/2  Bowel regimen: Scheduled Senna-docusate and Miralax daily      Renal:  #Hyperchloremia ~ resolved   - FWF 50 ml q2h   #Hypokalemia ~ resolved  - Potassium chloride 20 mEq BID     #Urinary retention  - Bladder scan PRN  - Doxazosin 1 mg daily     - IV Fluids: None  - BMP daily  - Electrolyte replacement protocol in place     Endo:  #Hyperglycemia   - Follow glucose levels     Heme:  #Leukocytosis; 14.9-->15.4  #Normocyctic anemia; 8.0  #Thrombocytosis; 853  - CBC daily     ID:  #Fever  #Aspiration pneumonia  #Probable Ventriculitis   #5/22 sputum with moderate growth pseudomonas, klebsiella which is pansensitive  - 5/28, 5/25, 5/19 pancultured for only heavy growth pseudomonas, Klebsiella oxytoca; Staphylococcus epidermidis; Heavy growth beta hemolytic Streptococcus  constellatus   - Ceftriaxone (5/21-5/23) was switched to Zosyn (5/23-5/28)-->switch to Cefepime and Vancomycin for presumed ventriculitis given elevated WBC/RBC ratio from CSF cell lines  - 6/1 BC x 2 with NGTD  - 6/1 Sputum culture--pending  - 6/1 CSF RBC 31,000; ; Glucose 73; Protein 71  - 6/1 CSF culture--pending  - Continue Vanc/Cefepime x 14 days, (Day 6/14)  - Follow fever curve     PPX:  DVT Prophylaxis  - SCDs in place  - Heparin subcutaneous 5000 mg q8h  GI Prophylaxis  - Famotidine 20 mg BID      Lines/Tubes/Drains:  - PIV x 3  - L subclavian CVC/TL 5/15  - R arterial line 5/27  - NG  - EVD     Code Status: Full code     Dispo: ICU    The patient was seen and discussed with the attending, Dr. Starr.    Audelia LAIRD, CNP  NeuroCritical Care Nurse Practitioner  Pager: [phone]  Ascom: *[phone] available M-Rodriguez [phone] to [phone]

## 2021-06-02 NOTE — PROGRESS NOTES
Interval note:    He underwent cerebral angiogram this afternoon. He received fentanyl and midazolam to induce conscious sedation. The angiogram showed no vasospasm. There was slow flow in the distal right BAYLEE with early venous filling likely 2/2 known BAYLEE infarct.    Exam after he came back from the Angiogram.     Mental status: he is not on any sedation. He does not respond to voice. Blinking to sternal rub. Does not follow commands  Cranial nerves: pupils 4 mm reactive bilaterally, intact corneals, intact oculocephalic and cough reflexes.  Motor: hypotonia in both UEs  RT >LT. Hypotonia in the RLE. Normal tone in the left LE. Withdrawal to pain in all 4 extremities, slightly better in the left than right side.   Reflexes: 2+ biceps bilaterally, left patellar. 3+ right patellar. Right ankle clonus. Toes going up in the right and down in the left  Sensory: withdrawal to pain in all 4 extremities  Coordination and gait: unable to assess      Dudley Menjivar MD  PGY 3 neurology

## 2021-06-02 NOTE — PROGRESS NOTES
Physician Attestation   Dayron was seen and evaluated by me on 06/2/21. He was in critical condition as the result of SAH, respiratory insufficiency due to neurologic condition, hypertension.    His condition is now Serious     I have reviewed changes in critical data from the last 24 hours, including medications, laboratory results, vital signs, radiograph results, and consultant recommendations.      The acute issues managed by me today include subarachnoid hemorrhage, hydrocephalus, hypertension, respiratory failure, cerebral vasospasm     Supportive interventions provided and/or ordered by me include ventilator management, EVD management, blood pressure management, correction of coagulation abnormalities     I saw this patient with the Advance Practice Provider and agree with the findings and plan of care as documented in the note.      I discussed the course and plan with the Bedside Nurse, Care Coordinator/, Patient's Family, and Primary team and answered all questions to the best of my ability.     Last 24 hours: uneventful, angiogram yesterday negative for vasospasm. Febrile. Neurological exam waxes and wane, follow simple commands this morning during round by giving thumbs up on the right.     Neuro   #HH5mF4 Subarachnoid hemorrhage (5/19) postbleed day 18  -Secondary to right P-comm aneurysm, secured 15 May by open clipping  -Nimodipine 60 mg every 4 hours for outcome optimization  -Daily TCD's monitoring for vasospasm: b/l BAYLEE vasospasm. TCD for 21 days per nsgy  -CTA with diffuse vasospasm 5/29 and cerebral angiogram 5/28: diffuse asospasm left and right A1 s/p verapamil in both ICAas 5/28  -Decrease milrinone to 0.75 mcg/kg/min. 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 (right frontal, parietal, temporal, and left parasagittal parietal)  -Q2h neurocheck   #Hydrocephalus  -EVD remains at 10+ AEM, ICP 4-7,189 ml last 24 hours  #Analgesia/Sedation - goal   -APAP 975 Q6  PRN fever/mild pain  -prn oxycodone  -off quetiapine  -precedex as needed to maintain 0 to -1  #Encephalopathy 2/2 above  -may consider a neurostimulant once out of the acute phase  CV   #Hypertension  -Goal systolic blood pressure less than 200  - continue PRN, avoiding long acting PO medications during vasospasm risk window  #Echocardiogram: EF 60-65%  - repeat limited echocardiogram: unchanged, EF remains 60-65%  -will decrease milrinone to 0.75 mcg/kg/min. Aim for MAP>70 and CPP btw 60-70. Intermittently on levo  Pulm  # Respiratory failure w/ hypoxia improving. suspecting aspiration pneumonia/pneumonitis as a cause. Intubated on 5/19. Tolerating PSV.   #thick secretion - improving  - duoneb q6h (tall lungs, ?emphysema)  - cont 3%saline nebs q6hr and guaifenesin q6h  - plan for extubation trial this week and if failed wife agrees to trach/Peg  -- CTA chest - negative for pulm embolism  GI   #Enteral access due to mental status  -tube feed at goal  -CDiff: negative  -bowel regimen, last BM 6/2  -Will need PEG at the same time as trach  Renal   #Hypokalemia- on scheduled po potassium  #hypernatremia- resolved  - replete electrolyte   - FWF 50ml q2h  - check Na q12h  - strict I&O  # Replete electrolytes, Mg++ 2, PO4- 3, K+ 4  # Na goal normonatremia and euvolemia  # Urinary retention  - doxazosin -resume  Heme   #Anemia- will monitor Hgb 11.7>9.7  #Thrombocytosis- worsening, will follow  # goal hgb>7, INR<1.5, platelets>100  ID  #Febrile, leukocytosis stable  -follow cx  -suspecting ventriculitis. cont vanc and cefepime 2 weeks course  - previously on piptazo (5/23- to 5/28)  - MRSA swab negative  Endo  #ISS with hypoglycemia   -baseline TSH/T4 - 5.36/1.23  #glucose stable, A1c 5.5   PPX:  SCD, hep subcutaneous, famotidine  Lines/tubes: left frontal EVD, left subclavian triple-lumen catheter, ETT, external catheter, NGT, Corrine     Total Critical Care time spent by me, excluding procedures, was 40 min.  Hank  DARELL Starr MD

## 2021-06-02 NOTE — PLAN OF CARE
Major Shift Events:  Patient has tolerated pressure support since approximately 0600. Frequent suctioning needed for secretions. Neuro status unchanged, patient intermittently followed commands and became more alert throughout the shift. Patient did open eyes briefly throughout the day with repeated stimulation. Up to the chair X 1. ROM done with PT. Adequate urine output with condom cath, BM X 2.     Plan: Maintain pressure support overnight, potential extubation tomorrow. Continue to monitor neuro status.   For vital signs and complete assessments, please see documentation flowsheets.

## 2021-06-03 ENCOUNTER — ANESTHESIA EVENT (OUTPATIENT)
Dept: INTENSIVE CARE | Facility: CLINIC | Age: 71
End: 2021-06-03

## 2021-06-03 ENCOUNTER — APPOINTMENT (OUTPATIENT)
Dept: ULTRASOUND IMAGING | Facility: CLINIC | Age: 71
DRG: 003 | End: 2021-06-03
Attending: STUDENT IN AN ORGANIZED HEALTH CARE EDUCATION/TRAINING PROGRAM
Payer: COMMERCIAL

## 2021-06-03 ENCOUNTER — APPOINTMENT (OUTPATIENT)
Dept: GENERAL RADIOLOGY | Facility: CLINIC | Age: 71
DRG: 003 | End: 2021-06-03
Attending: NURSE PRACTITIONER
Payer: COMMERCIAL

## 2021-06-03 ENCOUNTER — ANESTHESIA (OUTPATIENT)
Dept: INTENSIVE CARE | Facility: CLINIC | Age: 71
End: 2021-06-03

## 2021-06-03 LAB
ANION GAP SERPL CALCULATED.3IONS-SCNC: 5 MMOL/L (ref 3–14)
BACTERIA SPEC CULT: NO GROWTH
BACTERIA SPEC CULT: NO GROWTH
BASE EXCESS BLDA CALC-SCNC: 4.9 MMOL/L
BUN SERPL-MCNC: 22 MG/DL (ref 7–30)
CALCIUM SERPL-MCNC: 8.8 MG/DL (ref 8.5–10.1)
CHLORIDE SERPL-SCNC: 105 MMOL/L (ref 94–109)
CO2 SERPL-SCNC: 27 MMOL/L (ref 20–32)
CREAT SERPL-MCNC: 0.44 MG/DL (ref 0.66–1.25)
ERYTHROCYTE [DISTWIDTH] IN BLOOD BY AUTOMATED COUNT: 13.1 % (ref 10–15)
GFR SERPL CREATININE-BSD FRML MDRD: >90 ML/MIN/{1.73_M2}
GLUCOSE BLDC GLUCOMTR-MCNC: 122 MG/DL (ref 70–99)
GLUCOSE BLDC GLUCOMTR-MCNC: 131 MG/DL (ref 70–99)
GLUCOSE BLDC GLUCOMTR-MCNC: 142 MG/DL (ref 70–99)
GLUCOSE BLDC GLUCOMTR-MCNC: 156 MG/DL (ref 70–99)
GLUCOSE BLDC GLUCOMTR-MCNC: 157 MG/DL (ref 70–99)
GLUCOSE SERPL-MCNC: 164 MG/DL (ref 70–99)
HCO3 BLD-SCNC: 28 MMOL/L (ref 21–28)
HCT VFR BLD AUTO: 24.8 % (ref 40–53)
HGB BLD-MCNC: 7.9 G/DL (ref 13.3–17.7)
Lab: NORMAL
MAGNESIUM SERPL-MCNC: 2.3 MG/DL (ref 1.6–2.3)
MCH RBC QN AUTO: 30.3 PG (ref 26.5–33)
MCHC RBC AUTO-ENTMCNC: 31.9 G/DL (ref 31.5–36.5)
MCV RBC AUTO: 95 FL (ref 78–100)
O2/TOTAL GAS SETTING VFR VENT: ABNORMAL %
OXYHGB MFR BLD: 92 % (ref 92–100)
PCO2 BLD: 37 MM HG (ref 35–45)
PH BLD: 7.5 PH (ref 7.35–7.45)
PHOSPHATE SERPL-MCNC: 3.2 MG/DL (ref 2.5–4.5)
PLATELET # BLD AUTO: 776 10E9/L (ref 150–450)
PO2 BLD: 68 MM HG (ref 80–105)
POTASSIUM SERPL-SCNC: 3.9 MMOL/L (ref 3.4–5.3)
RBC # BLD AUTO: 2.61 10E12/L (ref 4.4–5.9)
SODIUM SERPL-SCNC: 136 MMOL/L (ref 133–144)
SPECIMEN SOURCE: NORMAL
SPECIMEN SOURCE: NORMAL
VANCOMYCIN SERPL-MCNC: 21.7 MG/L
WBC # BLD AUTO: 14.3 10E9/L (ref 4–11)

## 2021-06-03 PROCEDURE — 94640 AIRWAY INHALATION TREATMENT: CPT

## 2021-06-03 PROCEDURE — 250N000011 HC RX IP 250 OP 636: Performed by: STUDENT IN AN ORGANIZED HEALTH CARE EDUCATION/TRAINING PROGRAM

## 2021-06-03 PROCEDURE — 94667 MNPJ CHEST WALL 1ST: CPT

## 2021-06-03 PROCEDURE — 83735 ASSAY OF MAGNESIUM: CPT | Performed by: STUDENT IN AN ORGANIZED HEALTH CARE EDUCATION/TRAINING PROGRAM

## 2021-06-03 PROCEDURE — 80202 ASSAY OF VANCOMYCIN: CPT | Performed by: STUDENT IN AN ORGANIZED HEALTH CARE EDUCATION/TRAINING PROGRAM

## 2021-06-03 PROCEDURE — 250N000009 HC RX 250: Performed by: STUDENT IN AN ORGANIZED HEALTH CARE EDUCATION/TRAINING PROGRAM

## 2021-06-03 PROCEDURE — 94664 DEMO&/EVAL PT USE INHALER: CPT

## 2021-06-03 PROCEDURE — 84100 ASSAY OF PHOSPHORUS: CPT | Performed by: STUDENT IN AN ORGANIZED HEALTH CARE EDUCATION/TRAINING PROGRAM

## 2021-06-03 PROCEDURE — 250N000013 HC RX MED GY IP 250 OP 250 PS 637: Performed by: NEUROLOGICAL SURGERY

## 2021-06-03 PROCEDURE — 250N000013 HC RX MED GY IP 250 OP 250 PS 637: Performed by: STUDENT IN AN ORGANIZED HEALTH CARE EDUCATION/TRAINING PROGRAM

## 2021-06-03 PROCEDURE — 71045 X-RAY EXAM CHEST 1 VIEW: CPT

## 2021-06-03 PROCEDURE — 93886 INTRACRANIAL COMPLETE STUDY: CPT

## 2021-06-03 PROCEDURE — 99291 CRITICAL CARE FIRST HOUR: CPT | Mod: GC | Performed by: PSYCHIATRY & NEUROLOGY

## 2021-06-03 PROCEDURE — 80048 BASIC METABOLIC PNL TOTAL CA: CPT | Performed by: STUDENT IN AN ORGANIZED HEALTH CARE EDUCATION/TRAINING PROGRAM

## 2021-06-03 PROCEDURE — 82805 BLOOD GASES W/O2 SATURATION: CPT | Performed by: NEUROLOGICAL SURGERY

## 2021-06-03 PROCEDURE — 85027 COMPLETE CBC AUTOMATED: CPT | Performed by: STUDENT IN AN ORGANIZED HEALTH CARE EDUCATION/TRAINING PROGRAM

## 2021-06-03 PROCEDURE — 250N000013 HC RX MED GY IP 250 OP 250 PS 637: Performed by: NURSE PRACTITIONER

## 2021-06-03 PROCEDURE — 258N000003 HC RX IP 258 OP 636: Performed by: NEUROLOGICAL SURGERY

## 2021-06-03 PROCEDURE — 94640 AIRWAY INHALATION TREATMENT: CPT | Mod: 76

## 2021-06-03 PROCEDURE — 250N000011 HC RX IP 250 OP 636: Performed by: NEUROLOGICAL SURGERY

## 2021-06-03 PROCEDURE — 250N000009 HC RX 250: Performed by: PSYCHIATRY & NEUROLOGY

## 2021-06-03 PROCEDURE — 94003 VENT MGMT INPAT SUBQ DAY: CPT

## 2021-06-03 PROCEDURE — 71045 X-RAY EXAM CHEST 1 VIEW: CPT | Mod: 26 | Performed by: RADIOLOGY

## 2021-06-03 PROCEDURE — 999N001017 HC STATISTIC GLUCOSE BY METER IP

## 2021-06-03 PROCEDURE — 93886 INTRACRANIAL COMPLETE STUDY: CPT | Mod: 26 | Performed by: RADIOLOGY

## 2021-06-03 PROCEDURE — 200N000002 HC R&B ICU UMMC

## 2021-06-03 PROCEDURE — 999N000157 HC STATISTIC RCP TIME EA 10 MIN

## 2021-06-03 RX ORDER — ONDANSETRON 2 MG/ML
4 INJECTION INTRAMUSCULAR; INTRAVENOUS EVERY 6 HOURS PRN
Status: DISCONTINUED | OUTPATIENT
Start: 2021-06-03 | End: 2021-06-08

## 2021-06-03 RX ORDER — LISINOPRIL 10 MG/1
10 TABLET ORAL DAILY
Status: DISCONTINUED | OUTPATIENT
Start: 2021-06-03 | End: 2021-06-04

## 2021-06-03 RX ORDER — ACETAMINOPHEN 325 MG/1
650 TABLET ORAL EVERY 4 HOURS PRN
Status: DISCONTINUED | OUTPATIENT
Start: 2021-06-03 | End: 2021-06-05

## 2021-06-03 RX ADMIN — SODIUM CHLORIDE SOLN NEBU 3% 3 ML: 3 NEBU SOLN at 13:41

## 2021-06-03 RX ADMIN — Medication 1 PACKET: at 08:25

## 2021-06-03 RX ADMIN — NIMODIPINE 60 MG: 30 SOLUTION ORAL at 03:44

## 2021-06-03 RX ADMIN — NYSTATIN 500000 UNITS: 500000 SUSPENSION ORAL at 19:54

## 2021-06-03 RX ADMIN — HEPARIN SODIUM 5000 UNITS: 5000 INJECTION, SOLUTION INTRAVENOUS; SUBCUTANEOUS at 17:10

## 2021-06-03 RX ADMIN — Medication 10 ML: at 08:26

## 2021-06-03 RX ADMIN — LABETALOL HYDROCHLORIDE 10 MG: 5 INJECTION, SOLUTION INTRAVENOUS at 15:23

## 2021-06-03 RX ADMIN — MILRINONE LACTATE IN DEXTROSE 0.75 MCG/KG/MIN: 200 INJECTION, SOLUTION INTRAVENOUS at 11:35

## 2021-06-03 RX ADMIN — NIMODIPINE 60 MG: 30 SOLUTION ORAL at 23:53

## 2021-06-03 RX ADMIN — HYDRALAZINE HYDROCHLORIDE 10 MG: 20 INJECTION INTRAMUSCULAR; INTRAVENOUS at 23:06

## 2021-06-03 RX ADMIN — VANCOMYCIN HYDROCHLORIDE 1750 MG: 10 INJECTION, POWDER, LYOPHILIZED, FOR SOLUTION INTRAVENOUS at 05:58

## 2021-06-03 RX ADMIN — Medication 1 PACKET: at 19:53

## 2021-06-03 RX ADMIN — MILRINONE LACTATE IN DEXTROSE 0.75 MCG/KG/MIN: 200 INJECTION, SOLUTION INTRAVENOUS at 17:38

## 2021-06-03 RX ADMIN — MILRINONE LACTATE IN DEXTROSE 0.75 MCG/KG/MIN: 200 INJECTION, SOLUTION INTRAVENOUS at 00:54

## 2021-06-03 RX ADMIN — GUAIFENESIN 10 ML: 100 SOLUTION ORAL at 22:32

## 2021-06-03 RX ADMIN — DOXAZOSIN 1 MG: 1 TABLET ORAL at 08:26

## 2021-06-03 RX ADMIN — LISINOPRIL 10 MG: 10 TABLET ORAL at 17:10

## 2021-06-03 RX ADMIN — VANCOMYCIN HYDROCHLORIDE 1750 MG: 10 INJECTION, POWDER, LYOPHILIZED, FOR SOLUTION INTRAVENOUS at 18:26

## 2021-06-03 RX ADMIN — CEFEPIME 2 G: 2 INJECTION, POWDER, FOR SOLUTION INTRAVENOUS at 17:40

## 2021-06-03 RX ADMIN — Medication 10 ML: at 03:44

## 2021-06-03 RX ADMIN — SODIUM CHLORIDE SOLN NEBU 3% 3 ML: 3 NEBU SOLN at 07:43

## 2021-06-03 RX ADMIN — Medication 10 ML: at 15:53

## 2021-06-03 RX ADMIN — IPRATROPIUM BROMIDE AND ALBUTEROL SULFATE 3 ML: 2.5; .5 SOLUTION RESPIRATORY (INHALATION) at 13:41

## 2021-06-03 RX ADMIN — HEPARIN SODIUM 5000 UNITS: 5000 INJECTION, SOLUTION INTRAVENOUS; SUBCUTANEOUS at 08:25

## 2021-06-03 RX ADMIN — SODIUM CHLORIDE SOLN NEBU 3% 3 ML: 3 NEBU SOLN at 21:35

## 2021-06-03 RX ADMIN — GUAIFENESIN 10 ML: 100 SOLUTION ORAL at 06:01

## 2021-06-03 RX ADMIN — NIMODIPINE 60 MG: 30 SOLUTION ORAL at 15:53

## 2021-06-03 RX ADMIN — HEPARIN SODIUM 5000 UNITS: 5000 INJECTION, SOLUTION INTRAVENOUS; SUBCUTANEOUS at 23:53

## 2021-06-03 RX ADMIN — NIMODIPINE 60 MG: 30 SOLUTION ORAL at 08:26

## 2021-06-03 RX ADMIN — CEFEPIME 2 G: 2 INJECTION, POWDER, FOR SOLUTION INTRAVENOUS at 02:11

## 2021-06-03 RX ADMIN — SODIUM CHLORIDE SOLN NEBU 3% 3 ML: 3 NEBU SOLN at 04:04

## 2021-06-03 RX ADMIN — POTASSIUM CHLORIDE 20 MEQ: 1.5 POWDER, FOR SOLUTION ORAL at 19:54

## 2021-06-03 RX ADMIN — MILRINONE LACTATE IN DEXTROSE 0.75 MCG/KG/MIN: 200 INJECTION, SOLUTION INTRAVENOUS at 06:48

## 2021-06-03 RX ADMIN — NIMODIPINE 60 MG: 30 SOLUTION ORAL at 19:54

## 2021-06-03 RX ADMIN — GUAIFENESIN 10 ML: 100 SOLUTION ORAL at 15:52

## 2021-06-03 RX ADMIN — NYSTATIN 500000 UNITS: 500000 SUSPENSION ORAL at 08:26

## 2021-06-03 RX ADMIN — IPRATROPIUM BROMIDE AND ALBUTEROL SULFATE 3 ML: 2.5; .5 SOLUTION RESPIRATORY (INHALATION) at 21:36

## 2021-06-03 RX ADMIN — MILRINONE LACTATE IN DEXTROSE 0.75 MCG/KG/MIN: 200 INJECTION, SOLUTION INTRAVENOUS at 23:53

## 2021-06-03 RX ADMIN — LABETALOL HYDROCHLORIDE 20 MG: 5 INJECTION, SOLUTION INTRAVENOUS at 17:40

## 2021-06-03 RX ADMIN — INSULIN ASPART 1 UNITS: 100 INJECTION, SOLUTION INTRAVENOUS; SUBCUTANEOUS at 03:41

## 2021-06-03 RX ADMIN — IPRATROPIUM BROMIDE AND ALBUTEROL SULFATE 3 ML: 2.5; .5 SOLUTION RESPIRATORY (INHALATION) at 07:43

## 2021-06-03 RX ADMIN — FAMOTIDINE 20 MG: 20 TABLET ORAL at 08:26

## 2021-06-03 RX ADMIN — Medication 10 ML: at 19:54

## 2021-06-03 RX ADMIN — Medication 10 ML: at 23:53

## 2021-06-03 RX ADMIN — CEFEPIME 2 G: 2 INJECTION, POWDER, FOR SOLUTION INTRAVENOUS at 10:27

## 2021-06-03 RX ADMIN — INSULIN ASPART 1 UNITS: 100 INJECTION, SOLUTION INTRAVENOUS; SUBCUTANEOUS at 08:25

## 2021-06-03 RX ADMIN — NYSTATIN 500000 UNITS: 500000 SUSPENSION ORAL at 17:10

## 2021-06-03 RX ADMIN — IPRATROPIUM BROMIDE AND ALBUTEROL SULFATE 3 ML: 2.5; .5 SOLUTION RESPIRATORY (INHALATION) at 04:04

## 2021-06-03 ASSESSMENT — ACTIVITIES OF DAILY LIVING (ADL)
ADLS_ACUITY_SCORE: 24

## 2021-06-03 NOTE — PLAN OF CARE
Major Shift Events:  Neuros unchanged. Following commands inconsistently and slowly. EVD at 10 cm above. Tolerating pressure support settings. Frequent suctioning for secretions. Hemodynamically stable.      Plan: SBP <200. MAP >70. Possible extubation today. Continue to monitor neuro status.   For vital signs and complete assessments, please see documentation flowsheets.

## 2021-06-03 NOTE — PROGRESS NOTES
[facility], Procedure Note          Extubation:       Dayron Neri  MRN# [id number]   Pamela 3, 2021, 11:45 AM         Patient extubated at: Pamela 3, 2021, 11:45 AM   Supplemental Oxygen: Via face mask at 4 liters per minute   Cough: The cough is weak   Secretion Mode: PRN suction with assistance   Secretion Amount: Moderate amount, thick and white/creamy in color   Respiratory Exam:: Breath sounds: coarse crackles     Location: all lobes   Skin Exam:: Patient color: natural   Patient Status: Currently appears comfortable    Arterial Blood Gasses: pH Arterial (pH)   Date Value   05/31/2021 7.47 (H)     pO2 Arterial (mm Hg)   Date Value   05/31/2021 153 (H)     pCO2 Arterial (mm Hg)   Date Value   05/31/2021 39     Bicarbonate Arterial (mmol/L)   Date Value   05/31/2021 29 (H)            Recorded by Antonella Torres

## 2021-06-03 NOTE — PHARMACY-VANCOMYCIN DOSING SERVICE
Pharmacy Vancomycin Note  Date of Service Pamela 3, 2021  Patient's  1950   70 year old male    Indication:  Ventriculitis  Day of Therapy: 6  Current vancomycin regimen:  1750 mg IV q12h  Current vancomycin monitoring method: Trough (Method 2 = manual dose calculation)  Current vancomycin therapeutic monitoring goal: 15-20 mg/L    Current estimated CrCl = Estimated Creatinine Clearance: 168.2 mL/min (A) (based on SCr of 0.44 mg/dL (L)).    Creatinine for last 3 days  2021:  3:48 AM Creatinine 0.50 mg/dL  2021:  3:37 AM Creatinine 0.51 mg/dL  6/3/2021:  3:34 AM Creatinine 0.44 mg/dL    Recent Vancomycin Levels (past 3 days)  2021:  3:48 AM Vancomycin Level 18.7 mg/L  6/3/2021:  3:34 AM Vancomycin Level 21.7 mg/L    Vancomycin IV Administrations (past 72 hours)                   vancomycin (VANCOCIN) 1,750 mg in sodium chloride 0.9 % 250 mL intermittent infusion (mg) 1,750 mg New Bag 21 0558     1,750 mg New Bag 21    vancomycin (VANCOCIN) 1,750 mg in sodium chloride 0.9 % 500 mL intermittent infusion (mg) 1,750 mg New Bag 21 0555     1,750 mg New Bag 21 1810     1,750 mg New Bag  0547     1,750 mg New Bag 21 1821              Nephrotoxins and other renal medications (From now, onward)    Start     Dose/Rate Route Frequency Ordered Stop    21 1800  vancomycin (VANCOCIN) 1,750 mg in sodium chloride 0.9 % 250 mL intermittent infusion      1,750 mg (central catheter)  over 90 Minutes Intravenous EVERY 12 HOURS 21 1624 21 2359          Contrast Orders - past 72 hours (72h ago, onward)    Start     Dose/Rate Route Frequency Ordered Stop    21 1500  iodixanol (VISIPAQUE 320) injection 50 mL      50 mL Intravenous ONCE 21 1431 21 1524    21 0900  iopamidol (ISOVUE-370) solution 115 mL      115 mL Intravenous ONCE 21 0856 21 0945        Interpretation of levels and current regimen:  Level of 21.7 mg/L represents a 7.5  hr post-dose level - estimated true trough is 15.4mg/L which is within goal range.     Has serum creatinine changed greater than 50% in last 72 hours: No    Urine output:  good urine output    Renal Function: Stable    Plan:  1. Continue 1750mg IV vancomycin q12h.  2. Vancomycin monitoring method: Trough (Method 2 = manual dose calculation)  3. Vancomycin therapeutic monitoring goal: 15-20 mg/L  4. Pharmacy will check vancomycin levels as appropriate in 3-5 Days.  5. Serum creatinine levels will be ordered daily for the first week of therapy and at least twice weekly for subsequent weeks.    Penelope Acosta, PharmD, BCCCP

## 2021-06-03 NOTE — PLAN OF CARE
Major Shift Events:  Patient extubated at 1145. Needed constant suctioning for ~ 2 hours. Increasingly able to clear secretions. Orally suctioning as able. Satting upper 90s on 4L oxi mask. Nasal trumpet in place for nasal tracheal suctioning. EVD at 10 above EAM. Labetalol given x2 to maintain SBP < 200. No other major events.  Plan: Continue to closely monitor respiratory status and encourage coughing. Continue neuro assessments q4h starting at 2000.  For vital signs and complete assessments, please see documentation flowsheets.

## 2021-06-03 NOTE — PROGRESS NOTES
Neuroscience Intensive Care Progress Note  2021    REASON FOR CRITICAL CARE ADMISSION: Subarachnoid hemorrhage due to ruptured aneurysm     ADMITTING PHYSICIAN: Jamin Martinez MD      Date of Service (when I saw the patient): 6/3/21    Problem List  1.  Aneurysmal subarachnoid hemorrhage, HH 5, MF 4, bleed day 5/15  2.  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  Hydrocephalus status post EVD 5/15  4.  Hypertension  5.  Acute hypoxic respiratory failure  6.  Leukocytosis  7.  Normocytic anemia  8. Right frontal, left frontoparietal, and right superior  parietal infarcts  9. Vasospasms S/p Verapamil on     24 hour events:  No acute events overnight.    24 Hour Vital Signs Summary:  Temperatures:  Current - Temp: 98.6  F (37  C); Max - Temp  Av.8  F (37.1  C)  Min: 98  F (36.7  C)  Max: 100.7  F (38.2  C)  Respiration range: Resp  Av.8  Min: 19  Max: 32  Pulse range: Pulse  Av.6  Min: 64  Max: 87  Blood pressure range: Systolic (24hrs), Av , Min:95 , Max:177   ; Diastolic (24hrs), Av, Min:52, Max:107  Arterial Line BP: ()/() 169/83  MAP:  [72 mmHg-131 mmHg] 114 mmHg  BP - Mean:  [] 107  Pulse oximetry range: SpO2  Av.5 %  Min: 90 %  Max: 100 %    Ventilator Settings  Ventilation Mode: CPAP/PS  (Continuous positive airway pressure with Pressure Support)  FiO2 (%): 30 %  Rate Set (breaths/minute): 14 breaths/min  Tidal Volume Set (mL): 450 mL  PEEP (cm H2O): 5 cmH2O  Pressure Support (cm H2O): 7 cmH2O  Oxygen Concentration (%): 30 %  Peak Inspiratory Pressure (cm H2O) (Drager Marifer): 14  Resp: 23        Intake/Output Summary (Last 24 hours) at 6/3/2021 0739  Last data filed at 6/3/2021 0700  Gross per 24 hour   Intake 4238.59 ml   Output 3463 ml   Net 775.59 ml       Current Medications:    ceFEPIme (MAXIPIME) IV  2 g Intravenous Q8H     doxazosin  1 mg Oral or Feeding Tube Daily     famotidine  20 mg Oral or Feeding Tube BID      "guaiFENesin  10 mL Oral or Feeding Tube Q6H     heparin ANTICOAGULANT  5,000 Units Subcutaneous Q8H     insulin aspart  1-12 Units Subcutaneous Q4H     ipratropium - albuterol 0.5 mg/2.5 mg/3 mL  3 mL Nebulization Q6H     niMODipine  60 mg Oral or Feeding Tube Q4H MARAH    And     sodium chloride 0.9 %  10 mL Oral or Feeding Tube Q4H MARAH     nystatin  500,000 Units Mouth/Throat 4x Daily     polyethylene glycol  17 g Oral or Feeding Tube Daily     potassium chloride  20 mEq Oral or Feeding Tube BID     protein modular  1 packet Per Feeding Tube TID     senna-docusate  1 tablet Oral or Feeding Tube BID    Or     senna-docusate  2 tablet Oral or Feeding Tube BID     sodium chloride  3 mL Nebulization Q6H     vancomycin (VANCOCIN) IV  1,750 mg (central catheter) Intravenous Q12H       PRN Medications:  acetaminophen, bisacodyl, glucose **OR** dextrose **OR** glucagon, fentaNYL, hydrALAZINE, labetalol, lidocaine 4%, lidocaine (buffered or not buffered), - MEDICATION INSTRUCTIONS -, metoclopramide **OR** metoclopramide, naloxone **OR** naloxone **OR** naloxone **OR** naloxone, ondansetron **OR** ondansetron, oxyCODONE, prochlorperazine **OR** prochlorperazine **OR** prochlorperazine, sodium chloride (PF), sodium chloride (PF)    Infusions:    dexmedetomidine Stopped (05/31/21 0724)     - MEDICATION INSTRUCTIONS -       milrinone 0.75 mcg/kg/min (06/03/21 0648)     sodium chloride Stopped (06/01/21 2000)       No Known Allergies    Physical Examination:  Vitals: BP (!) 150/88   Pulse 71   Temp 98.6  F (37  C) (Axillary)   Resp 23   Ht 1.803 m (5' 11\")   Wt 74 kg (163 lb 2.3 oz)   SpO2 98%   BMI 22.75 kg/m    General: Adult male patient, lying in bed, NAD  HEENT: Normocephalic/Atraumatic, no icterus, Oral cavity/Oropharynx pink and moist  Cardiac: RRR  Chest: No SOB, pattern regular, unlabored, expansion symmetric, no retractions or use of accessory muscles, assisted mechanically   Abdomen: Soft, bowel sounds " present  Extremities: Warm, no edema  Skin: No rash or lesion   Psych: Calm   Neuro:  Mental status: Awake, opens eyes spontaneously and to voice, able to track examiner this morning, inconsistently follows commands, intubated.   Cranial nerves: PERRL, conjugate gaze, pupils +3 round and reactive, cough and gag intact with deep suctioning.   Motor: Normal bulk and tone. No abnormal movements. 1/5 strength in RUE, RLE, withdraws. 3/5 strength in LUE, LLE, purposeful movement.   Sensory: Withdraws to noxious stimuli x 4.  Coordination: KWAME, deferred.  Gait: KWAME, deferred.    Labs/Studies:  Recent Labs   Lab Test 06/03/21  0334 06/02/21  0337 06/01/21  0348    137 142   POTASSIUM 3.9 3.7 4.0   CHLORIDE 105 106 109   CO2 27 26 27   ANIONGAP 5 5 6   * 164* 168*   BUN 22 22 24   CR 0.44* 0.51* 0.50*   RAGINI 8.8 8.8 9.4   WBC 14.3* 15.4* 14.9*   RBC 2.61* 2.60* 2.47*   HGB 7.9* 8.0* 7.5*   * 853* 844*       Recent Labs   Lab Test 05/15/21  2125 05/15/21  1222 05/15/21  0930   INR 1.20* 1.16* 1.07   PTT 30 29 27       Recent Labs   Lab 05/31/21  0705 05/30/21  1759 05/30/21  1618 05/28/21  0400   PH 7.47* 7.45 7.52* 7.43   PCO2 39 42 35 49*   PO2 153* 367* 85 84   HCO3 29* 30* 28 33*   O2PER 40.0 40.0 40.0 60.0         Imaging:  All imaging personally reviewed.    Assessment/Plan  Mr Neri is a 70-year-old gentleman currently admitted on 5/15/21 with subarachnoid hemorrhage (MF4, HH5) and brain compression with midline shift R>L 2/2 ruptured right P-comm aneurysm. Now S/p right P-comm aneurysm clipping on 5/15. Hospital course c/b acute respiratory failure with concern for aspiration PNA, S/p re-intubation on 5/19, right frontal, left frontoparietal, and right superior parietal infarcts on CT head 5/28, and vasospasms S/p b/l ICA verapamil on 5/30.        Plan  Neuro:  #Subarachnoid hemorrhage (MF4, HH5), aneurysmal; PBD#19  #Ruptured R PCOM aneurysm  Cerebral angiogram on 5/15 with confirmation  of rutpure  POD#19 for R craniotomy and clipping of PCOM aneurysm  - Neuro checks q2h, q4h at noc  - Nimodipine 60 mg q4h  - Daily TCD x 21 days per NSGY  - HOB > 30 degrees     #Right frontal, left frontoparietal, and right superior parietal infarcts on CT head 5/28  #Bilateral Delayed Cerebral Ischemia, likely from vasospasm   Angio 5/30 with mild vasospasm, s/p b/l ICA verapamil  CTA 5/30 with diffuse b/l anterior and posterior circulation vasospasm; now with Milrinone and Norepinephrine  - Milrinone .75 mcg/kg/min  - CPP 60-70              #IVH with subsequent hydrocephalus   #Brain compression with midline shift R>L  EVD placed 5/15  - EVD @ 10' open, ICP 2-8, drained 103 ml's in the last 24hrs     #Agitation  - Seroquel 12.5 mg BID PRN  - Stop Precedex gtt   - Delirium precautions     #Analgesics  - Tylenol 650 mg q4h PRN   - Oxycodone 5 mg q4h PRN      CV:  #HTN  ECHO completed on 5/17; EF 60-65%  - SBP < 200  - Holding PTA Lisinopril 10 mg daily   - Hydralazine and Labetalol PRN     See Neuro   - Milrinone .75 mcg/kg/min     Pulm:  #Acute hypoxic respiratory failure  #Thick secretions   Re-intubated 5/19  - Extubate today  - PST as able  - Pulmonary hygiene  - Duonebs q6h  - Guaifenesin q6h  - 3% saline nebs q6h     - Continuous pulse ox monitoring  - Maintain O2 saturations > 92%     GI/Nutrition:  #Severe malnutrition in the context of acute on chronic illness  - Protein modular 1 Pkt  - Multivitamins w/minerals     - Diet: TF's @ goal (60)   - NG   - Last BM--6/3  Bowel regimen: Scheduled Senna-docusate and Miralax daily      Renal:  #Hyperchloremia ~ resolved   - FWF 50 ml q2h   #Hypokalemia ~ resolved  - Potassium chloride 20 mEq BID     #Urinary retention  - Bladder scan PRN  - Doxazosin 1 mg daily     - IV Fluids: None  - BMP daily  - Electrolyte replacement protocol in place     Endo:  #Hyperglycemia   - Follow glucose levels     Heme:  #Leukocytosis; 15.4-->14.3  #Normocyctic  anemia; 7.9  #Thrombocytosis; 853-->776  - CBC daily     ID:  #Fever  #Aspiration pneumonia  #Probable Ventriculitis   #5/22 sputum with moderate growth pseudomonas, klebsiella which is pansensitive  - 5/28, 5/25, 5/19 pancultured for only heavy growth pseudomonas, Klebsiella oxytoca; Staphylococcus epidermidis; Heavy growth beta hemolytic Streptococcus constellatus   - Ceftriaxone (5/21-5/23) was switched to Zosyn (5/23-5/28)-->switch to Cefepime and Vancomycin for presumed ventriculitis given elevated WBC/RBC ratio from CSF cell lines  - 6/1 BC x 2 with NGTD  - 6/1 Sputum culture--prelim w/Moderate growth pseudomonas aeruginosa  - 6/1 CSF RBC 31,000; ; Glucose 73; Protein 71  - 6/1 CSF culture--pending  - Continue Vanc/Cefepime x 14 days, (Day 7/14)  - Follow fever curve     PPX:  DVT Prophylaxis  - SCDs in place  - Heparin subcutaneous 5000 mg q8h  GI Prophylaxis  - Famotidine 20 mg BID      Lines/Tubes/Drains:  - PIV x 3  - L subclavian CVC/TL 5/15  - R arterial line 5/27  - NG  - EVD     Code Status: Full code     Dispo: ICU    The patient was seen and discussed with the attending, Dr. Starr.    Audelia LAIRD, CNP  NeuroCritical Care Nurse Practitioner  Pager: [phone]  Ascom: [phone] available M-Rodriguez [phone] to [phone]

## 2021-06-03 NOTE — PROGRESS NOTES
CLINICAL NUTRITION SERVICES - REASSESSMENT NOTE     Nutrition Prescription    RECOMMENDATIONS FOR MDs/PROVIDERS TO ORDER:  - Total daily fluids/adjustments per MD  - Minimize disruptions to EN infusions to optimize nutrition intakes     Malnutrition Status:    - Severe malnutrition in the context of acute on chronic illness     Recommendations already ordered by Registered Dietitian (RD):  - Continue: Osmolite 1.5 Cristi @ goal of  60ml/hr (1440ml/day) will provide: 2160 kcals (31+ kcals/kg), 90 g PRO (1.3+ gm/kg), 1097 ml free H20, 293 g CHO, and 0 g fiber daily.  - Additional 3 packets Prosource = 123 gm PRO (1.8 gm/kg) and 2240 kcals (32 kcals)  - Elevated HOB    Future/Additional Recommendations:  - Ongoing EN tolerance via current access/NGT (Cortrak)  - Weight trends   - Repeat met cart as appro? If still INTUB     EVALUATION OF THE PROGRESS TOWARD GOALS   Diet: NPO    Nutrition Support: - Continue: Osmolite 1.5 Cristi @ goal of  60ml/hr (1440ml/day) will provide: 2160 kcals (31+ kcals/kg), 90 g PRO (1.3+ gm/kg), 1097 ml free H20, 293 g CHO, and 0 g fiber daily + 3 packets Prosource = 123 gm PRO (1.8 gm/kg) and 2240 kcals (32 kcals)    Intake: 7-day averages =  1294 ml, 1673 (24 kcals/kg), and 116 gm PRO (1.7 gm/kg) --> includes 3 packets Prosource.     NEW FINDINGS   Nutrition/GI: Pt continues on EN via current access/NGT (Cortrak). LBM 6/3.      Neuro: Aneurysmal subarachnoid hemorrhage, HH 5, MF 4; Ruptured right P-comm aneurysm status post clipping 5/15; IVH with subsequent hydrocephalus status post LF EVD 5/15; agitation. #Right frontal, left frontoparietal, and right superior parietal infarcts on CT head 5/28. Bilateral Delayed Cerebral Ischemia, likely from vasospasm  - Angio 5/30 with mild vasospasm, s/p b/l ICA verapamil.     Resp: remains vented - PST as able. Extubation trial today. RD inserted wire into small bore FT in preparation for extubation.       Renal: Fluid up since admit. Hyperchloremia -  resolved  (FWF 50 ml q2h). Hypokalemia- resolved (Potassium chloride 20 mEq BID)     Skin: Skin beneath nasal bridle was inspected; FT moved midline, some tension to right nare observed.      Labs/meds:  Creatinine: 0.44 (L); glucose trends: 164, 157. Insulin; Lasix; Certavite; Miralax; Senokot.     Weights: Overall weight has trended up from admit: 69 kg --> 74 kg. Likely r/t fluid status. Continue current dosing weight/driest, admit weight.     MALNUTRITION  % Intake: </= 50% for >/= 5 days (severe) - improved   % Weight Loss: None noted  Subcutaneous Fat Loss: Facial mild region: moderate and Upper arm: moderate  Muscle Loss: Temporal:  moderate, Thoracic region (clavicle, acromium bone, deltoid, trapezius, pectoral): severe, Upper arm (bicep, tricep):  moderate and Upper leg (quadricep, hamstring): moderate; calf: severe  Fluid Accumulation/Edema: None noted  Malnutrition Diagnosis: Severe malnutrition in the context of acute on chronic illness     *no change to physical assessment from last f/u note*     Previous Goals   Total avg nutritional intake to meet a minimum of 25 kcal/kg and 1.5 g PRO/kg daily (per dosing wt 69 kg).  Evaluation: Met for protein but not for energy    Previous Nutrition Diagnosis  Inadequate oral intake related to inability to take oral PO/vented as evidenced by pt requires nutrition support to meet 100% nutrition needs.    Evaluation: No change    CURRENT NUTRITION DIAGNOSIS  Inadequate energy intake related to disruptions to EN infusions as evidenced by pt not meeting goal energy provisions with 7-day averages meeting 24 kcals/kg dosing weight.      INTERVENTIONS  Implementation  Enteral Nutrition - continue as ordered. Monitor for restart post-EXT trial.     Goals  Total avg nutritional intake to meet a minimum of 25 kcal/kg and 1.5 g PRO/kg daily (per dosing wt 69 kg).    Monitoring/Evaluation  Progress toward goals will be monitored and evaluated per protocol.     Veronica Ramirez,  [facility], [facility]  Neuro ICU  Pager: [phone]     home

## 2021-06-04 ENCOUNTER — APPOINTMENT (OUTPATIENT)
Dept: ULTRASOUND IMAGING | Facility: CLINIC | Age: 71
DRG: 003 | End: 2021-06-04
Attending: STUDENT IN AN ORGANIZED HEALTH CARE EDUCATION/TRAINING PROGRAM
Payer: COMMERCIAL

## 2021-06-04 ENCOUNTER — APPOINTMENT (OUTPATIENT)
Dept: CT IMAGING | Facility: CLINIC | Age: 71
DRG: 003 | End: 2021-06-04
Attending: STUDENT IN AN ORGANIZED HEALTH CARE EDUCATION/TRAINING PROGRAM
Payer: COMMERCIAL

## 2021-06-04 ENCOUNTER — APPOINTMENT (OUTPATIENT)
Dept: PHYSICAL THERAPY | Facility: CLINIC | Age: 71
DRG: 003 | End: 2021-06-04
Attending: NEUROLOGICAL SURGERY
Payer: COMMERCIAL

## 2021-06-04 ENCOUNTER — APPOINTMENT (OUTPATIENT)
Dept: GENERAL RADIOLOGY | Facility: CLINIC | Age: 71
DRG: 003 | End: 2021-06-04
Attending: STUDENT IN AN ORGANIZED HEALTH CARE EDUCATION/TRAINING PROGRAM
Payer: COMMERCIAL

## 2021-06-04 LAB
ANION GAP SERPL CALCULATED.3IONS-SCNC: 7 MMOL/L (ref 3–14)
BACTERIA SPEC CULT: NO GROWTH
BASE EXCESS BLDA CALC-SCNC: 5.5 MMOL/L
BASE EXCESS BLDA CALC-SCNC: 5.6 MMOL/L
BASE EXCESS BLDA CALC-SCNC: 6.1 MMOL/L
BUN SERPL-MCNC: 20 MG/DL (ref 7–30)
CALCIUM SERPL-MCNC: 9.8 MG/DL (ref 8.5–10.1)
CHLORIDE SERPL-SCNC: 103 MMOL/L (ref 94–109)
CO2 SERPL-SCNC: 27 MMOL/L (ref 20–32)
CREAT SERPL-MCNC: 0.42 MG/DL (ref 0.66–1.25)
ERYTHROCYTE [DISTWIDTH] IN BLOOD BY AUTOMATED COUNT: 13.1 % (ref 10–15)
GFR SERPL CREATININE-BSD FRML MDRD: >90 ML/MIN/{1.73_M2}
GLUCOSE BLDC GLUCOMTR-MCNC: 122 MG/DL (ref 70–99)
GLUCOSE BLDC GLUCOMTR-MCNC: 133 MG/DL (ref 70–99)
GLUCOSE BLDC GLUCOMTR-MCNC: 137 MG/DL (ref 70–99)
GLUCOSE BLDC GLUCOMTR-MCNC: 147 MG/DL (ref 70–99)
GLUCOSE BLDC GLUCOMTR-MCNC: 153 MG/DL (ref 70–99)
GLUCOSE BLDC GLUCOMTR-MCNC: 167 MG/DL (ref 70–99)
GLUCOSE BLDC GLUCOMTR-MCNC: 169 MG/DL (ref 70–99)
GLUCOSE SERPL-MCNC: 144 MG/DL (ref 70–99)
HCO3 BLD-SCNC: 29 MMOL/L (ref 21–28)
HCO3 BLD-SCNC: 29 MMOL/L (ref 21–28)
HCO3 BLD-SCNC: 30 MMOL/L (ref 21–28)
HCT VFR BLD AUTO: 29.5 % (ref 40–53)
HGB BLD-MCNC: 9.6 G/DL (ref 13.3–17.7)
MAGNESIUM SERPL-MCNC: 2.4 MG/DL (ref 1.6–2.3)
MCH RBC QN AUTO: 30.8 PG (ref 26.5–33)
MCHC RBC AUTO-ENTMCNC: 32.5 G/DL (ref 31.5–36.5)
MCV RBC AUTO: 95 FL (ref 78–100)
O2/TOTAL GAS SETTING VFR VENT: ABNORMAL %
OXYHGB MFR BLD: 89 % (ref 92–100)
OXYHGB MFR BLD: 91 % (ref 92–100)
OXYHGB MFR BLD: 98 % (ref 92–100)
PCO2 BLD: 35 MM HG (ref 35–45)
PCO2 BLD: 39 MM HG (ref 35–45)
PCO2 BLD: 39 MM HG (ref 35–45)
PH BLD: 7.48 PH (ref 7.35–7.45)
PH BLD: 7.5 PH (ref 7.35–7.45)
PH BLD: 7.52 PH (ref 7.35–7.45)
PHOSPHATE SERPL-MCNC: 3.1 MG/DL (ref 2.5–4.5)
PLATELET # BLD AUTO: 860 10E9/L (ref 150–450)
PO2 BLD: 113 MM HG (ref 80–105)
PO2 BLD: 59 MM HG (ref 80–105)
PO2 BLD: 65 MM HG (ref 80–105)
POTASSIUM SERPL-SCNC: 3.5 MMOL/L (ref 3.4–5.3)
RBC # BLD AUTO: 3.12 10E12/L (ref 4.4–5.9)
SODIUM SERPL-SCNC: 137 MMOL/L (ref 133–144)
SPECIMEN SOURCE: NORMAL
WBC # BLD AUTO: 17.3 10E9/L (ref 4–11)

## 2021-06-04 PROCEDURE — 250N000009 HC RX 250: Performed by: STUDENT IN AN ORGANIZED HEALTH CARE EDUCATION/TRAINING PROGRAM

## 2021-06-04 PROCEDURE — 250N000011 HC RX IP 250 OP 636: Performed by: STUDENT IN AN ORGANIZED HEALTH CARE EDUCATION/TRAINING PROGRAM

## 2021-06-04 PROCEDURE — 71045 X-RAY EXAM CHEST 1 VIEW: CPT

## 2021-06-04 PROCEDURE — 250N000013 HC RX MED GY IP 250 OP 250 PS 637: Performed by: NEUROLOGICAL SURGERY

## 2021-06-04 PROCEDURE — 258N000003 HC RX IP 258 OP 636: Performed by: NEUROLOGICAL SURGERY

## 2021-06-04 PROCEDURE — 94668 MNPJ CHEST WALL SBSQ: CPT

## 2021-06-04 PROCEDURE — 250N000013 HC RX MED GY IP 250 OP 250 PS 637: Performed by: STUDENT IN AN ORGANIZED HEALTH CARE EDUCATION/TRAINING PROGRAM

## 2021-06-04 PROCEDURE — 82805 BLOOD GASES W/O2 SATURATION: CPT | Performed by: NEUROLOGICAL SURGERY

## 2021-06-04 PROCEDURE — 85027 COMPLETE CBC AUTOMATED: CPT | Performed by: STUDENT IN AN ORGANIZED HEALTH CARE EDUCATION/TRAINING PROGRAM

## 2021-06-04 PROCEDURE — 94640 AIRWAY INHALATION TREATMENT: CPT

## 2021-06-04 PROCEDURE — 99291 CRITICAL CARE FIRST HOUR: CPT | Mod: GC | Performed by: PSYCHIATRY & NEUROLOGY

## 2021-06-04 PROCEDURE — 83735 ASSAY OF MAGNESIUM: CPT | Performed by: STUDENT IN AN ORGANIZED HEALTH CARE EDUCATION/TRAINING PROGRAM

## 2021-06-04 PROCEDURE — 94667 MNPJ CHEST WALL 1ST: CPT

## 2021-06-04 PROCEDURE — 009630Z DRAINAGE OF CEREBRAL VENTRICLE WITH DRAINAGE DEVICE, PERCUTANEOUS APPROACH: ICD-10-PCS | Performed by: NEUROLOGICAL SURGERY

## 2021-06-04 PROCEDURE — 93886 INTRACRANIAL COMPLETE STUDY: CPT | Mod: 26 | Performed by: RADIOLOGY

## 2021-06-04 PROCEDURE — 94640 AIRWAY INHALATION TREATMENT: CPT | Mod: 76

## 2021-06-04 PROCEDURE — 80048 BASIC METABOLIC PNL TOTAL CA: CPT | Performed by: STUDENT IN AN ORGANIZED HEALTH CARE EDUCATION/TRAINING PROGRAM

## 2021-06-04 PROCEDURE — 97110 THERAPEUTIC EXERCISES: CPT | Mod: GP

## 2021-06-04 PROCEDURE — 250N000013 HC RX MED GY IP 250 OP 250 PS 637: Performed by: NURSE PRACTITIONER

## 2021-06-04 PROCEDURE — 999N000157 HC STATISTIC RCP TIME EA 10 MIN

## 2021-06-04 PROCEDURE — 250N000009 HC RX 250

## 2021-06-04 PROCEDURE — 93886 INTRACRANIAL COMPLETE STUDY: CPT

## 2021-06-04 PROCEDURE — 70450 CT HEAD/BRAIN W/O DYE: CPT

## 2021-06-04 PROCEDURE — 999N001017 HC STATISTIC GLUCOSE BY METER IP

## 2021-06-04 PROCEDURE — 84100 ASSAY OF PHOSPHORUS: CPT | Performed by: STUDENT IN AN ORGANIZED HEALTH CARE EDUCATION/TRAINING PROGRAM

## 2021-06-04 PROCEDURE — 250N000011 HC RX IP 250 OP 636

## 2021-06-04 PROCEDURE — 250N000011 HC RX IP 250 OP 636: Performed by: NEUROLOGICAL SURGERY

## 2021-06-04 PROCEDURE — 250N000009 HC RX 250: Performed by: PSYCHIATRY & NEUROLOGY

## 2021-06-04 PROCEDURE — 70450 CT HEAD/BRAIN W/O DYE: CPT | Mod: 26 | Performed by: RADIOLOGY

## 2021-06-04 PROCEDURE — 00P630Z REMOVAL OF DRAINAGE DEVICE FROM CEREBRAL VENTRICLE, PERCUTANEOUS APPROACH: ICD-10-PCS | Performed by: NEUROLOGICAL SURGERY

## 2021-06-04 PROCEDURE — 200N000002 HC R&B ICU UMMC

## 2021-06-04 PROCEDURE — 71045 X-RAY EXAM CHEST 1 VIEW: CPT | Mod: 26 | Performed by: RADIOLOGY

## 2021-06-04 RX ORDER — LISINOPRIL 10 MG/1
10 TABLET ORAL DAILY
Status: DISCONTINUED | OUTPATIENT
Start: 2021-06-05 | End: 2021-06-11 | Stop reason: HOSPADM

## 2021-06-04 RX ORDER — POTASSIUM CHLORIDE 1.5 G/1.58G
20 POWDER, FOR SOLUTION ORAL ONCE
Status: COMPLETED | OUTPATIENT
Start: 2021-06-04 | End: 2021-06-04

## 2021-06-04 RX ORDER — LIDOCAINE HYDROCHLORIDE AND EPINEPHRINE 10; 10 MG/ML; UG/ML
INJECTION, SOLUTION INFILTRATION; PERINEURAL
Status: COMPLETED
Start: 2021-06-04 | End: 2021-06-04

## 2021-06-04 RX ORDER — FENTANYL CITRATE 50 UG/ML
INJECTION, SOLUTION INTRAMUSCULAR; INTRAVENOUS
Status: COMPLETED
Start: 2021-06-04 | End: 2021-06-04

## 2021-06-04 RX ORDER — LIDOCAINE HYDROCHLORIDE AND EPINEPHRINE 10; 10 MG/ML; UG/ML
1 INJECTION, SOLUTION INFILTRATION; PERINEURAL ONCE
Status: COMPLETED | OUTPATIENT
Start: 2021-06-04 | End: 2021-06-04

## 2021-06-04 RX ORDER — FENTANYL CITRATE 50 UG/ML
50-100 INJECTION, SOLUTION INTRAMUSCULAR; INTRAVENOUS ONCE
Status: COMPLETED | OUTPATIENT
Start: 2021-06-04 | End: 2021-06-04

## 2021-06-04 RX ADMIN — NIMODIPINE 60 MG: 30 SOLUTION ORAL at 20:35

## 2021-06-04 RX ADMIN — IPRATROPIUM BROMIDE AND ALBUTEROL SULFATE 3 ML: 2.5; .5 SOLUTION RESPIRATORY (INHALATION) at 01:52

## 2021-06-04 RX ADMIN — Medication 10 ML: at 16:49

## 2021-06-04 RX ADMIN — POTASSIUM CHLORIDE 20 MEQ: 1.5 POWDER, FOR SOLUTION ORAL at 08:03

## 2021-06-04 RX ADMIN — NIMODIPINE 60 MG: 30 SOLUTION ORAL at 04:07

## 2021-06-04 RX ADMIN — MILRINONE LACTATE IN DEXTROSE 0.75 MCG/KG/MIN: 200 INJECTION, SOLUTION INTRAVENOUS at 06:01

## 2021-06-04 RX ADMIN — NIMODIPINE 60 MG: 30 SOLUTION ORAL at 11:54

## 2021-06-04 RX ADMIN — SODIUM CHLORIDE SOLN NEBU 3% 3 ML: 3 NEBU SOLN at 08:26

## 2021-06-04 RX ADMIN — NYSTATIN 500000 UNITS: 500000 SUSPENSION ORAL at 11:53

## 2021-06-04 RX ADMIN — MILRINONE LACTATE IN DEXTROSE 0.5 MCG/KG/MIN: 200 INJECTION, SOLUTION INTRAVENOUS at 22:20

## 2021-06-04 RX ADMIN — IPRATROPIUM BROMIDE AND ALBUTEROL SULFATE 3 ML: 2.5; .5 SOLUTION RESPIRATORY (INHALATION) at 08:26

## 2021-06-04 RX ADMIN — SODIUM CHLORIDE SOLN NEBU 3% 3 ML: 3 NEBU SOLN at 13:21

## 2021-06-04 RX ADMIN — NYSTATIN 500000 UNITS: 500000 SUSPENSION ORAL at 20:34

## 2021-06-04 RX ADMIN — NYSTATIN 500000 UNITS: 500000 SUSPENSION ORAL at 08:09

## 2021-06-04 RX ADMIN — VANCOMYCIN HYDROCHLORIDE 1750 MG: 10 INJECTION, POWDER, LYOPHILIZED, FOR SOLUTION INTRAVENOUS at 06:14

## 2021-06-04 RX ADMIN — VANCOMYCIN HYDROCHLORIDE 1750 MG: 10 INJECTION, POWDER, LYOPHILIZED, FOR SOLUTION INTRAVENOUS at 19:17

## 2021-06-04 RX ADMIN — GUAIFENESIN 10 ML: 100 SOLUTION ORAL at 16:47

## 2021-06-04 RX ADMIN — Medication 10 ML: at 11:58

## 2021-06-04 RX ADMIN — IPRATROPIUM BROMIDE AND ALBUTEROL SULFATE 3 ML: 2.5; .5 SOLUTION RESPIRATORY (INHALATION) at 19:57

## 2021-06-04 RX ADMIN — Medication 1 PACKET: at 16:46

## 2021-06-04 RX ADMIN — DOXAZOSIN 1 MG: 1 TABLET ORAL at 08:03

## 2021-06-04 RX ADMIN — NYSTATIN 500000 UNITS: 500000 SUSPENSION ORAL at 16:50

## 2021-06-04 RX ADMIN — Medication 10 ML: at 08:00

## 2021-06-04 RX ADMIN — INSULIN ASPART 2 UNITS: 100 INJECTION, SOLUTION INTRAVENOUS; SUBCUTANEOUS at 11:51

## 2021-06-04 RX ADMIN — Medication 10 ML: at 04:07

## 2021-06-04 RX ADMIN — CEFEPIME 2 G: 2 INJECTION, POWDER, FOR SOLUTION INTRAVENOUS at 02:42

## 2021-06-04 RX ADMIN — INSULIN ASPART 1 UNITS: 100 INJECTION, SOLUTION INTRAVENOUS; SUBCUTANEOUS at 08:24

## 2021-06-04 RX ADMIN — NIMODIPINE 60 MG: 30 SOLUTION ORAL at 16:47

## 2021-06-04 RX ADMIN — CEFEPIME 2 G: 2 INJECTION, POWDER, FOR SOLUTION INTRAVENOUS at 18:26

## 2021-06-04 RX ADMIN — Medication 10 ML: at 20:35

## 2021-06-04 RX ADMIN — Medication 1 PACKET: at 08:09

## 2021-06-04 RX ADMIN — Medication 1 PACKET: at 20:35

## 2021-06-04 RX ADMIN — POTASSIUM CHLORIDE 20 MEQ: 1.5 POWDER, FOR SOLUTION ORAL at 11:54

## 2021-06-04 RX ADMIN — HYDRALAZINE HYDROCHLORIDE 20 MG: 20 INJECTION INTRAMUSCULAR; INTRAVENOUS at 04:14

## 2021-06-04 RX ADMIN — SODIUM CHLORIDE SOLN NEBU 3% 3 ML: 3 NEBU SOLN at 19:57

## 2021-06-04 RX ADMIN — IPRATROPIUM BROMIDE AND ALBUTEROL SULFATE 3 ML: 2.5; .5 SOLUTION RESPIRATORY (INHALATION) at 13:21

## 2021-06-04 RX ADMIN — GUAIFENESIN 10 ML: 100 SOLUTION ORAL at 22:21

## 2021-06-04 RX ADMIN — INSULIN ASPART 1 UNITS: 100 INJECTION, SOLUTION INTRAVENOUS; SUBCUTANEOUS at 04:07

## 2021-06-04 RX ADMIN — POTASSIUM CHLORIDE 20 MEQ: 1.5 POWDER, FOR SOLUTION ORAL at 20:34

## 2021-06-04 RX ADMIN — FENTANYL CITRATE 50 MCG: 50 INJECTION, SOLUTION INTRAMUSCULAR; INTRAVENOUS at 05:58

## 2021-06-04 RX ADMIN — GUAIFENESIN 10 ML: 100 SOLUTION ORAL at 04:07

## 2021-06-04 RX ADMIN — INSULIN ASPART 1 UNITS: 100 INJECTION, SOLUTION INTRAVENOUS; SUBCUTANEOUS at 00:01

## 2021-06-04 RX ADMIN — CEFEPIME 2 G: 2 INJECTION, POWDER, FOR SOLUTION INTRAVENOUS at 11:39

## 2021-06-04 RX ADMIN — LIDOCAINE HYDROCHLORIDE,EPINEPHRINE BITARTRATE 1 ML: 10; .01 INJECTION, SOLUTION INFILTRATION; PERINEURAL at 05:42

## 2021-06-04 RX ADMIN — LIDOCAINE HYDROCHLORIDE AND EPINEPHRINE 1 ML: 10; 10 INJECTION, SOLUTION INFILTRATION; PERINEURAL at 05:42

## 2021-06-04 RX ADMIN — NIMODIPINE 60 MG: 30 SOLUTION ORAL at 08:02

## 2021-06-04 RX ADMIN — GUAIFENESIN 10 ML: 100 SOLUTION ORAL at 11:53

## 2021-06-04 RX ADMIN — MILRINONE LACTATE IN DEXTROSE 0.5 MCG/KG/MIN: 200 INJECTION, SOLUTION INTRAVENOUS at 12:46

## 2021-06-04 RX ADMIN — LISINOPRIL 10 MG: 10 TABLET ORAL at 08:03

## 2021-06-04 ASSESSMENT — ACTIVITIES OF DAILY LIVING (ADL)
ADLS_ACUITY_SCORE: 24
ADLS_ACUITY_SCORE: 24
ADLS_ACUITY_SCORE: 26
ADLS_ACUITY_SCORE: 24

## 2021-06-04 NOTE — CONSULTS
BRIEF SOCIAL WORK NOTE    Supportive conversation with spouse via phone. SW and spouse explored feelings related to grief, adjustment to illness and positive coping skills. discharge plan will be confirmed when Pt is medically stable. SW assured spouse we will assist with all logistical planning for post hospital rehabs. SW will continue to follow for psychosocial support, resources and advocate on behalf of the patient.    JOE Campos, [facility]  ICU    [facility]  Phone: [phone]  Pager: [phone]

## 2021-06-04 NOTE — PLAN OF CARE
Major Shift Events:    Neuro: At times able to say a couple of words. Right eye out word gaze at times. Pt able to move left foot and right foot with stimulation. EVD @ 10 above and ranged from 11-2 during shift. Output ranged from 3-9 mL/hr.   CV: Sinus with old ST depression. SBP in 150-180 SBP goal <200.  Resp: On 4LPM oxyimask recheck PaO2 went from 59 to 65. Thick secretions and pt has intermittent good cough.   GI: Primafit in place. Bladder scan showed 27mL.  : X2 large liquid stools. Rectal pouch placed on patient.   Plan: milrinone gtt decreased. Cefepime and vancomycin. One more day of nimodipine.   For vital signs and complete assessments, please see documentation flowsheets.

## 2021-06-04 NOTE — PROGRESS NOTES
Neuroscience Intensive Care Progress Note  2021    REASON FOR CRITICAL CARE ADMISSION: Subarachnoid hemorrhage due to ruptured aneurysm     ADMITTING PHYSICIAN: Jamin Martinez MD      Date of Service (when I saw the patient): 21    Problem List  1.  Aneurysmal subarachnoid hemorrhage, HH 5, MF 4, bleed day 5/15  2.  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  Hydrocephalus status post EVD 5/15  4.  Hypertension  5.  Acute hypoxic respiratory failure  6.  Leukocytosis  7.  Normocytic anemia  8.  Right frontal, left frontoparietal, and right superior  parietal infarcts  9.  Vasospasms S/p Verapamil on     24 hour events:  EVD malfunction, replaced this morning by NSGY. Remains extubated.    24 Hour Vital Signs Summary:  Temperatures:  Current - Temp: 97.3  F (36.3  C); Max - Temp  Av.8  F (36.6  C)  Min: 97.3  F (36.3  C)  Max: 98.5  F (36.9  C)  Respiration range: Resp  Av.2  Min: 20  Max: 26  Pulse range: Pulse  Av.5  Min: 58  Max: 85  Blood pressure range: Systolic (24hrs), Av , Min:133 , Max:199   ; Diastolic (24hrs), Av, Min:70, Max:105  Arterial Line BP: (123-206)/() 149/57  MAP:  [72 mmHg-137 mmHg] 78 mmHg  BP - Mean:  [] 87  Pulse oximetry range: SpO2  Av.7 %  Min: 88 %  Max: 100 %    Ventilator Settings  Oxi Plus mask/4L      Intake/Output Summary (Last 24 hours) at 2021 0741  Last data filed at 2021 0700  Gross per 24 hour   Intake 3051.7 ml   Output 4360 ml   Net -1308.3 ml       Current Medications:    ceFEPIme (MAXIPIME) IV  2 g Intravenous Q8H     doxazosin  1 mg Oral or Feeding Tube Daily     guaiFENesin  10 mL Oral or Feeding Tube Q6H     [Held by provider] heparin ANTICOAGULANT  5,000 Units Subcutaneous Q8H     insulin aspart  1-12 Units Subcutaneous Q4H     ipratropium - albuterol 0.5 mg/2.5 mg/3 mL  3 mL Nebulization Q6H     lisinopril  10 mg Oral Daily     midazolam  1-2 mg Intravenous Once     niMODipine  60  "mg Oral or Feeding Tube Q4H MARAH    And     sodium chloride 0.9 %  10 mL Oral or Feeding Tube Q4H MARAH     nystatin  500,000 Units Mouth/Throat 4x Daily     polyethylene glycol  17 g Oral or Feeding Tube Daily     potassium chloride  20 mEq Oral or Feeding Tube Once     potassium chloride  20 mEq Oral or Feeding Tube BID     protein modular  1 packet Per Feeding Tube TID     senna-docusate  1 tablet Oral or Feeding Tube BID    Or     senna-docusate  2 tablet Oral or Feeding Tube BID     sodium chloride  3 mL Nebulization Q6H     vancomycin (VANCOCIN) IV  1,750 mg (central catheter) Intravenous Q12H       PRN Medications:  acetaminophen, bisacodyl, hydrALAZINE, labetalol, naloxone **OR** naloxone **OR** naloxone **OR** naloxone, ondansetron, oxyCODONE, prochlorperazine    Infusions:    milrinone 0.75 mcg/kg/min (06/04/21 0601)       No Known Allergies    Physical Examination:  Vitals: /73   Pulse 66   Temp 97.3  F (36.3  C) (Axillary)   Resp 26   Ht 1.803 m (5' 11\")   Wt 74 kg (163 lb 2.3 oz)   SpO2 93%   BMI 22.75 kg/m    General: Adult male patient, lying in bed, NAD  HEENT: Normocephalic/Atraumatic, no icterus, Oral cavity/Oropharynx pink and moist  Cardiac: RRR  Chest: No SOB, pattern regular, unlabored, expansion symmetric, no retractions or use of accessory muscles   Abdomen: Soft, bowel sounds present  Extremities: Warm, no edema  Skin: No rash or lesion   Psych: Calm   Neuro:  Mental status: Drowsy, opens eyes spontaneously and to voice, able to track examiner, inconsistently follows commands.   Cranial nerves: PERRL, conjugate gaze, EOMI, pupils +4 round and reactive, facial sensation intact, face symmetric, cough and gag present, speech soft, hoarse.    Motor: Normal bulk and tone. No abnormal movements. 3/5 strength in RUE, RLE, LLE withdraws. 4/5 strength in LUE, purposeful motor response.    Sensory: Withdraws to noxious stimuli x 4.  Coordination: KWAME, deferred.  Gait: KWAME, " deferred.    Labs/Studies:  Recent Labs   Lab Test 21  0346 21  0334 21  0337    136 137   POTASSIUM 3.5 3.9 3.7   CHLORIDE 103 105 106   CO2 27 27 26   ANIONGAP 7 5 5   * 164* 164*   BUN 20 22 22   CR 0.42* 0.44* 0.51*   RAGINI 9.8 8.8 8.8   WBC 17.3* 14.3* 15.4*   RBC 3.12* 2.61* 2.60*   HGB 9.6* 7.9* 8.0*   * 776* 853*       Recent Labs   Lab Test 05/15/21  2125 05/15/21  1222 05/15/21  0930   INR 1.20* 1.16* 1.07   PTT 30 29 27       Recent Labs   Lab 21  0346 21  2343 21  1505 21  0705   PH 7.50* 7.52* 7.50* 7.47*   PCO2 39 35 37 39   PO2 59* 113* 68* 153*   HCO3 30* 29* 28 29*   O2PER 3L 4L 6L 40.0         Imagin. XR Chest Port 1 VW on 21 at 0615  Impression:   1. Pulmonary edema with mild prolongation of bibasilar opacities  likely atelectasis.  2. Partial visualization of the enteric feeding tube appears to loop  within the stomach and in correlation to prior exam the feeding tube  tip is not postpyloric in location.     Assessment/Plan  Mr Neri is a 70-year-old gentleman currently admitted on 5/15/21 with subarachnoid hemorrhage (MF4, HH5) and brain compression with midline shift R>L 2/2 ruptured right P-comm aneurysm. Now S/p right P-comm aneurysm clipping on 5/15. Hospital course c/b acute respiratory failure with concern for aspiration PNA, S/p re-intubation on , right frontal, left frontoparietal, and right superior parietal infarcts on CT head , and vasospasms S/p b/l ICA verapamil on .        Plan  Neuro:  #Subarachnoid hemorrhage (MF4, HH5), aneurysmal; PBD#20  #Ruptured R PCOM aneurysm  Cerebral angiogram on 5/15 with confirmation of rutpure  POD#20 for R craniotomy and clipping of PCOM aneurysm  - Neuro checks q2h, (q4h at noc)  - Nimodipine 60 mg q4h  - Daily TCD x 21 days per NSGY  - HOB > 30 degrees     #Right frontal, left frontoparietal, and right superior parietal infarcts on CT head   #Bilateral Delayed  Cerebral Ischemia, likely from vasospasm   Angio 5/30 with mild vasospasm, s/p b/l ICA verapamil  CTA 5/30 with diffuse b/l anterior and posterior circulation vasospasm; now with Milrinone and Norepinephrine  - Milrinone .5 mcg/kg/min  - CPP 60-70              #IVH with subsequent hydrocephalus   #Brain compression with midline shift R>L  EVD placed 5/15, replaced 6/4 2/2 EVD malfunction   - EVD @ 10' open, ICP 3-8, drained 111 ml's in the last 24hrs     #Agitation  - Seroquel 12.5 mg BID PRN  - Delirium precautions     #Analgesics  - Tylenol 650 mg q4h PRN   - Oxycodone 5 mg q4h PRN       CV:  #HTN  ECHO completed on 5/17; EF 60-65%  - SBP < 200  - PTA Lisinopril 10 mg daily   - Hydralazine and Labetalol PRN     See Neuro   - Milrinone .5 mcg/kg/min     Pulm:  #Aspiration pneumonia  #Thick secretions   Extubated 6/3  - ABG this afternoon  - Pulmonary hygiene  - Duonebs q6h  - Guaifenesin q6h  - 3% saline nebs q6h  - Chest Physiotherapy q6h     - Continuous pulse ox monitoring  - Maintain O2 saturations > 92%     GI/Nutrition:  #Severe malnutrition in the context of acute on chronic illness  - Protein modular 1 Pkt  - Multivitamins w/minerals     - Diet: TF's @ goal (60)   - NG   - Last BM--6/4  Bowel regimen: Scheduled Senna-docusate and Miralax daily      Renal:  #Hyperchloremia ~ resolved   - FWF 50 ml q2h   #Hypokalemia ~ resolved  - Potassium chloride 20 mEq BID     #Urinary retention  - Bladder scan PRN  - Doxazosin 1 mg daily     - IV Fluids: None  - BMP daily  - Electrolyte replacement protocol in place     Endo:  #Hyperglycemia   - Follow glucose levels     Heme:  #Leukocytosis; 14.3-->17.3  #Normocyctic anemia; 9.6  #Thrombocytosis; 776-->860  - CBC daily     ID:  #Fever  #Aspiration pneumonia  #Probable Ventriculitis   #5/22 sputum with moderate growth pseudomonas, klebsiella which is pansensitive  - 5/28, 5/25, 5/19 pancultured for only heavy growth pseudomonas, Klebsiella oxytoca; Staphylococcus  epidermidis; Heavy growth beta hemolytic Streptococcus constellatus   - Ceftriaxone (5/21-5/23) was switched to Zosyn (5/23-5/28)-->switch to Cefepime and Vancomycin for presumed ventriculitis given elevated WBC/RBC ratio from CSF cell lines  - 6/1 BC x 2 with NGTD  - 6/1 Sputum culture--prelim w/Moderate growth pseudomonas aeruginosa, heavy growth staphylococcus epidermidis, light growth lactose fermenting gram negative rods   - 6/1 CSF RBC 31,000; ; Glucose 73; Protein 71  - 6/1 CSF culture--pending  - Continue Vanc/Cefepime x 14 days, (Day 8/14)  - Follow fever curve     PPX:  DVT Prophylaxis  - SCDs in place  - Heparin subcutaneous 5000 mg q8h  GI Prophylaxis  - Not indicated       Lines/Tubes/Drains:  - PIV x 2  - L subclavian CVC/TL 5/15  - R arterial line 5/27  - NG  - EVD     Code Status: Full code     Dispo: ICU    The patient was seen and discussed with the attending, Dr. Starr.    Audelia LAIRD, CNP  NeuroCritical Care Nurse Practitioner  Pager: [phone]  Ascom: [phone] available M-Rodriguez 0700 to 1700

## 2021-06-04 NOTE — PROCEDURES
"Neurosurgery Ventriculostomy Procedure Note  Date of Procedure: 06/04/2021  Pre procedure Diagnosis: EVD malfunction  Post procedure Diagnosis: same  Consent: Obtained  Anesthesia: Local  Time Out Performed  Procedure: left sided External ventricular drain replacement.  Nursing staff entered this procedure into the EVD Quality Improvement Database    Details of the Procedure:  - Bed was positioned for unencumbered access for sterile procedure  - Patient was supine with head in the neutral position.  - 50 mL of Fentanyl was administered.  - Antibiotics were given pre-procedure.  - Anatomical landmarks were used for defining the trajectory and entry point for the ventriculostomy.   - The old incision was then prepped and draped in the standard sterile fashion with full body draping.  - 2 mls of 1% lidocaine with [id number] epinephrine was injected.  - Using a # 15 blade the old incision was opened.  - Using a clean pair of sterile gloves, a bactiseal ventricular catheter with a stylet was passed aiming towards the ipsilateral medial canthus and midway between the ipsilateral tragus and lateral canthus.  - When the \"pop\" was felt as the catheter entered the ventricle and CSF was obtained, the stylet was withdrawn and the catheter was furthered another cm. CSF came out under low pressure. A cap was applied and opening pressure was obtained.  - A tunneler was passed from the incision >5 cm behind and the catheter was attached to the blunt end and tunneled out a distance from the incision.   - The catheter was then attached to the [place] drainage system and connected to the monitor. Opening pressure was very low.  - Incision was closed using 3/0 Nylon or staples.  - The drain was secured at the exit site using 3-0 nylon suture.  - Drain was secured using staples.  - Bioseal disk was placed at the exit site of the catheter  - The site was covered with sterile primapore dressing  - Patient tolerated the procedure well.  - The " drain was leveled and set at 0.    Monitoring of CSF will continue with samples collected every 5 days.    Neurosurgery staff, Dr. Martinez was immediately available during the procedure.      Adrian Paredes  Neurosurgery

## 2021-06-04 NOTE — PROGRESS NOTES
Neurosurgery progress note    S: no acute events overnight; EVD failed in the AM, replaced; extubated yesterday    O:  Temp:  [97.3  F (36.3  C)-98.5  F (36.9  C)] 97.3  F (36.3  C)  Pulse:  [58-85] 66  Resp:  [20-26] 26  BP: (133-199)/() 133/73  MAP:  [72 mmHg-137 mmHg] 78 mmHg  Arterial Line BP: (123-206)/() 149/57  FiO2 (%):  [30 %] 30 %  SpO2:  [88 %-100 %] 93 %    Exam:  Incision: c/d/i, drain and EVD in place  Extubated, 4LO2  Open eyes spontaneously  Localizes b/l UE L>R  Wiggles toes to command LLE  W/d RLE      Imaging:  No new imaging      ASSESSMENT: 70 year old man presented with AMS found to have aSAH with right supraclinoid aneurysm. mFS4 and HH5. GCS 5. S/p EVD placement, DCA,s/p aneurysm clipping on 5/16.     The following conditions are also present, complicating the patient's current management, as described in the Assessment and Plan:   mechanical ventilation on day of admission, intracerebral hematoma, brain compression, acute respiratory failure, coma, based on current GCS of 5 and cerebral aneurysm rupture with hunt sims scale 5 modified lowery 4     Hypokalemia   Severe malnutrition in the context of acute on chronic illness  Respiratory failure  Pneumonia     RECOMMENDATIONS:  - EVD: 10 open  - head CT post EVD replacement  - Q1hr neuro exam  - SBP<200  - HOB > 30 degrees  - follow-up clx  -- Cefepime/Vancomycin for PNA  -- CSF clx  - Continuous cardiac monitoring while in ICU   - Nimodipine (total 21 days)  - wean milrinone gtt  - daily TCDs, low threshold for CTA and CTP  - Glucose < 180  - Continue glucose checks  - Platelets > 100,000  - INR < 1.5  - Hemoglobin > 8  - DVT: SCDs while in bed  - Disposition: 4A  - PT/OT consulted        Samuel Nolasco MD, PhD  Neurosurgery Resident PGY-2    Please contact neurosurgery resident on call with questions.    Dial * * [phone], enter [phone] when prompted.

## 2021-06-04 NOTE — PROGRESS NOTES
Neurosurgery progress note    S: EVD sluggish again in the AM; will evaluate need for EVD given more reliable exam; continues to have lots of secretions    O:  Temp:  [97.9  F (36.6  C)-98.4  F (36.9  C)] 97.9  F (36.6  C)  Pulse:  [65-96] 82  Resp:  [20-28] 28  BP: (121-155)/(68-98) 140/86  MAP:  [71 mmHg-137 mmHg] 137 mmHg  Arterial Line BP: (112-181)/() 181/111  SpO2:  [89 %-99 %] 92 %    Exam:  Incision: c/d/i, drain and EVD in place  Extubated, 4LO2  Open eyes spontaneously  Localizes b/l UE L>R  Wiggles toes to command LLE  W/d RLE      Imaging:  Head CT 6/5/21                                                                   Impression:   1. Left frontal approach or ventriculostomy tube tip in the foramen of Monro. The ventricular system is mildly enlarged in comparison to 6/4/2020 exam as above.  2. Postsurgical changes of right frontal craniotomy. Mild progression of hypoattenuation changes in gray-white matter junction in bilateral cerebral hemispheres, right greater than left. Stable to subtle increase right to leftward midline shift.   3. Mixed heterogeneous hyperdensity extra-axial fluid collection along the right cerebral convexity has mildly increased.   4. Shifting subarachnoid hemorrhage.      ASSESSMENT: 70 year old man presented with AMS found to have aSAH with right supraclinoid aneurysm. mFS4 and HH5. GCS 5. S/p EVD placement, DCA,s/p aneurysm clipping on 5/16.     The following conditions are also present, complicating the patient's current management, as described in the Assessment and Plan:   mechanical ventilation on day of admission, intracerebral hematoma, brain compression, acute respiratory failure, coma, based on current GCS of 5 and cerebral aneurysm rupture with hunt sims scale 5 modified lowery 4     Hypokalemia   Severe malnutrition in the context of acute on chronic illness  Respiratory failure  Pneumonia     RECOMMENDATIONS:  - EVD: raise 20 open  - Q1hr neuro exam  -  SBP<200  - HOB > 30 degrees  - follow-up clx  -- Cefepime/Vancomycin for PNA  -- CSF clx  - Continuous cardiac monitoring while in ICU   - Nimodipine (total 21 days)  - wean milrinone gtt  - daily TCDs, low threshold for CTA and CTP  - Glucose < 180  - Continue glucose checks  - Platelets > 100,000  - INR < 1.5  - Hemoglobin > 8  - DVT: SCDs while in bed  - Disposition: 4A  - PT/OT consulted        Samuel Nolasco MD, PhD  Neurosurgery Resident PGY-2    Please contact neurosurgery resident on call with questions.    Dial * * [phone], enter [phone] when prompted.

## 2021-06-04 NOTE — PLAN OF CARE
Major Shift Events: Neuros unchanged. ICPs 1-9. Output 1-12. EVD replaced at 0630. Sinus rhythm. Given PRN hydralazine 10 mg x1 and  20 mg x1 for   SBP >200. Currently at 4 LPM per Oxymask. Attempted weaning to 3 LPM, pt could not maintain SaO2 >92%.     Plan: SBP goal <200, MAP >70. Continue to monitor EVD.   For vital signs and complete assessments, please see documentation flowsheets.

## 2021-06-05 ENCOUNTER — APPOINTMENT (OUTPATIENT)
Dept: CT IMAGING | Facility: CLINIC | Age: 71
DRG: 003 | End: 2021-06-05
Attending: STUDENT IN AN ORGANIZED HEALTH CARE EDUCATION/TRAINING PROGRAM
Payer: COMMERCIAL

## 2021-06-05 ENCOUNTER — APPOINTMENT (OUTPATIENT)
Dept: GENERAL RADIOLOGY | Facility: CLINIC | Age: 71
DRG: 003 | End: 2021-06-05
Attending: STUDENT IN AN ORGANIZED HEALTH CARE EDUCATION/TRAINING PROGRAM
Payer: COMMERCIAL

## 2021-06-05 LAB
ABO + RH BLD: NORMAL
ABO + RH BLD: NORMAL
ANION GAP SERPL CALCULATED.3IONS-SCNC: 4 MMOL/L (ref 3–14)
BACTERIA SPEC CULT: NORMAL
BASE EXCESS BLDA CALC-SCNC: 4 MMOL/L
BASE EXCESS BLDA CALC-SCNC: 4.8 MMOL/L
BLD GP AB SCN SERPL QL: NORMAL
BLOOD BANK CMNT PATIENT-IMP: NORMAL
BUN SERPL-MCNC: 25 MG/DL (ref 7–30)
CALCIUM SERPL-MCNC: 9.6 MG/DL (ref 8.5–10.1)
CHLORIDE SERPL-SCNC: 106 MMOL/L (ref 94–109)
CO2 SERPL-SCNC: 26 MMOL/L (ref 20–32)
CREAT SERPL-MCNC: 0.42 MG/DL (ref 0.66–1.25)
ERYTHROCYTE [DISTWIDTH] IN BLOOD BY AUTOMATED COUNT: 13.5 % (ref 10–15)
GFR SERPL CREATININE-BSD FRML MDRD: >90 ML/MIN/{1.73_M2}
GLUCOSE BLDC GLUCOMTR-MCNC: 102 MG/DL (ref 70–99)
GLUCOSE BLDC GLUCOMTR-MCNC: 116 MG/DL (ref 70–99)
GLUCOSE BLDC GLUCOMTR-MCNC: 136 MG/DL (ref 70–99)
GLUCOSE BLDC GLUCOMTR-MCNC: 155 MG/DL (ref 70–99)
GLUCOSE BLDC GLUCOMTR-MCNC: 158 MG/DL (ref 70–99)
GLUCOSE SERPL-MCNC: 149 MG/DL (ref 70–99)
HCO3 BLD-SCNC: 28 MMOL/L (ref 21–28)
HCO3 BLD-SCNC: 28 MMOL/L (ref 21–28)
HCT VFR BLD AUTO: 29.7 % (ref 40–53)
HGB BLD-MCNC: 9.6 G/DL (ref 13.3–17.7)
Lab: NORMAL
MAGNESIUM SERPL-MCNC: 2.4 MG/DL (ref 1.6–2.3)
MCH RBC QN AUTO: 30.8 PG (ref 26.5–33)
MCHC RBC AUTO-ENTMCNC: 32.3 G/DL (ref 31.5–36.5)
MCV RBC AUTO: 95 FL (ref 78–100)
O2/TOTAL GAS SETTING VFR VENT: 60 %
O2/TOTAL GAS SETTING VFR VENT: ABNORMAL %
OXYHGB MFR BLD: 87 % (ref 92–100)
OXYHGB MFR BLD: 96 % (ref 92–100)
PCO2 BLD: 37 MM HG (ref 35–45)
PCO2 BLD: 38 MM HG (ref 35–45)
PH BLD: 7.47 PH (ref 7.35–7.45)
PH BLD: 7.5 PH (ref 7.35–7.45)
PHOSPHATE SERPL-MCNC: 3.3 MG/DL (ref 2.5–4.5)
PLATELET # BLD AUTO: 726 10E9/L (ref 150–450)
PO2 BLD: 56 MM HG (ref 80–105)
PO2 BLD: 85 MM HG (ref 80–105)
POTASSIUM SERPL-SCNC: 3.8 MMOL/L (ref 3.4–5.3)
RBC # BLD AUTO: 3.12 10E12/L (ref 4.4–5.9)
SODIUM SERPL-SCNC: 136 MMOL/L (ref 133–144)
SPECIMEN EXP DATE BLD: NORMAL
SPECIMEN SOURCE: NORMAL
WBC # BLD AUTO: 16.3 10E9/L (ref 4–11)

## 2021-06-05 PROCEDURE — 250N000013 HC RX MED GY IP 250 OP 250 PS 637: Performed by: NURSE PRACTITIONER

## 2021-06-05 PROCEDURE — 70450 CT HEAD/BRAIN W/O DYE: CPT

## 2021-06-05 PROCEDURE — 80048 BASIC METABOLIC PNL TOTAL CA: CPT | Performed by: STUDENT IN AN ORGANIZED HEALTH CARE EDUCATION/TRAINING PROGRAM

## 2021-06-05 PROCEDURE — 250N000011 HC RX IP 250 OP 636: Performed by: STUDENT IN AN ORGANIZED HEALTH CARE EDUCATION/TRAINING PROGRAM

## 2021-06-05 PROCEDURE — 82805 BLOOD GASES W/O2 SATURATION: CPT | Performed by: NEUROLOGICAL SURGERY

## 2021-06-05 PROCEDURE — 70450 CT HEAD/BRAIN W/O DYE: CPT | Mod: 77

## 2021-06-05 PROCEDURE — 71045 X-RAY EXAM CHEST 1 VIEW: CPT | Mod: 26 | Performed by: RADIOLOGY

## 2021-06-05 PROCEDURE — 85027 COMPLETE CBC AUTOMATED: CPT | Performed by: STUDENT IN AN ORGANIZED HEALTH CARE EDUCATION/TRAINING PROGRAM

## 2021-06-05 PROCEDURE — 94668 MNPJ CHEST WALL SBSQ: CPT

## 2021-06-05 PROCEDURE — 86850 RBC ANTIBODY SCREEN: CPT | Performed by: NEUROLOGICAL SURGERY

## 2021-06-05 PROCEDURE — 999N001017 HC STATISTIC GLUCOSE BY METER IP

## 2021-06-05 PROCEDURE — 250N000009 HC RX 250: Performed by: STUDENT IN AN ORGANIZED HEALTH CARE EDUCATION/TRAINING PROGRAM

## 2021-06-05 PROCEDURE — 250N000013 HC RX MED GY IP 250 OP 250 PS 637: Performed by: STUDENT IN AN ORGANIZED HEALTH CARE EDUCATION/TRAINING PROGRAM

## 2021-06-05 PROCEDURE — 86900 BLOOD TYPING SEROLOGIC ABO: CPT | Performed by: NEUROLOGICAL SURGERY

## 2021-06-05 PROCEDURE — 94640 AIRWAY INHALATION TREATMENT: CPT | Mod: 76

## 2021-06-05 PROCEDURE — 70450 CT HEAD/BRAIN W/O DYE: CPT | Mod: 26 | Performed by: RADIOLOGY

## 2021-06-05 PROCEDURE — 71045 X-RAY EXAM CHEST 1 VIEW: CPT

## 2021-06-05 PROCEDURE — 250N000009 HC RX 250: Performed by: PSYCHIATRY & NEUROLOGY

## 2021-06-05 PROCEDURE — 999N000157 HC STATISTIC RCP TIME EA 10 MIN

## 2021-06-05 PROCEDURE — 94640 AIRWAY INHALATION TREATMENT: CPT

## 2021-06-05 PROCEDURE — 250N000013 HC RX MED GY IP 250 OP 250 PS 637: Performed by: NEUROLOGICAL SURGERY

## 2021-06-05 PROCEDURE — 71045 X-RAY EXAM CHEST 1 VIEW: CPT | Mod: 77

## 2021-06-05 PROCEDURE — 200N000002 HC R&B ICU UMMC

## 2021-06-05 PROCEDURE — 258N000003 HC RX IP 258 OP 636: Performed by: NEUROLOGICAL SURGERY

## 2021-06-05 PROCEDURE — 71045 X-RAY EXAM CHEST 1 VIEW: CPT | Mod: 26 | Performed by: STUDENT IN AN ORGANIZED HEALTH CARE EDUCATION/TRAINING PROGRAM

## 2021-06-05 PROCEDURE — 84100 ASSAY OF PHOSPHORUS: CPT | Performed by: STUDENT IN AN ORGANIZED HEALTH CARE EDUCATION/TRAINING PROGRAM

## 2021-06-05 PROCEDURE — 83735 ASSAY OF MAGNESIUM: CPT | Performed by: STUDENT IN AN ORGANIZED HEALTH CARE EDUCATION/TRAINING PROGRAM

## 2021-06-05 PROCEDURE — 250N000011 HC RX IP 250 OP 636: Performed by: NEUROLOGICAL SURGERY

## 2021-06-05 PROCEDURE — 99291 CRITICAL CARE FIRST HOUR: CPT | Mod: GC | Performed by: PSYCHIATRY & NEUROLOGY

## 2021-06-05 PROCEDURE — 86901 BLOOD TYPING SEROLOGIC RH(D): CPT | Performed by: NEUROLOGICAL SURGERY

## 2021-06-05 RX ORDER — MEROPENEM 1 G/1
1 INJECTION, POWDER, FOR SOLUTION INTRAVENOUS EVERY 8 HOURS
Status: DISCONTINUED | OUTPATIENT
Start: 2021-06-05 | End: 2021-06-05

## 2021-06-05 RX ORDER — ACETAMINOPHEN 325 MG/1
650 TABLET ORAL EVERY 4 HOURS PRN
Status: DISCONTINUED | OUTPATIENT
Start: 2021-06-05 | End: 2021-06-11 | Stop reason: HOSPADM

## 2021-06-05 RX ORDER — POTASSIUM CHLORIDE 1.5 G/1.58G
20 POWDER, FOR SOLUTION ORAL ONCE
Status: COMPLETED | OUTPATIENT
Start: 2021-06-05 | End: 2021-06-05

## 2021-06-05 RX ADMIN — NYSTATIN 500000 UNITS: 500000 SUSPENSION ORAL at 16:06

## 2021-06-05 RX ADMIN — NYSTATIN 500000 UNITS: 500000 SUSPENSION ORAL at 12:25

## 2021-06-05 RX ADMIN — SODIUM CHLORIDE SOLN NEBU 3% 3 ML: 3 NEBU SOLN at 08:58

## 2021-06-05 RX ADMIN — GUAIFENESIN 10 ML: 100 SOLUTION ORAL at 04:06

## 2021-06-05 RX ADMIN — VANCOMYCIN HYDROCHLORIDE 1750 MG: 10 INJECTION, POWDER, LYOPHILIZED, FOR SOLUTION INTRAVENOUS at 05:54

## 2021-06-05 RX ADMIN — NYSTATIN 500000 UNITS: 500000 SUSPENSION ORAL at 20:45

## 2021-06-05 RX ADMIN — SODIUM CHLORIDE SOLN NEBU 3% 3 ML: 3 NEBU SOLN at 02:18

## 2021-06-05 RX ADMIN — Medication 1 PACKET: at 14:45

## 2021-06-05 RX ADMIN — MILRINONE LACTATE IN DEXTROSE 0.5 MCG/KG/MIN: 200 INJECTION, SOLUTION INTRAVENOUS at 08:02

## 2021-06-05 RX ADMIN — GUAIFENESIN 10 ML: 100 SOLUTION ORAL at 10:38

## 2021-06-05 RX ADMIN — NIMODIPINE 60 MG: 30 SOLUTION ORAL at 08:05

## 2021-06-05 RX ADMIN — VANCOMYCIN HYDROCHLORIDE 1750 MG: 10 INJECTION, POWDER, LYOPHILIZED, FOR SOLUTION INTRAVENOUS at 18:57

## 2021-06-05 RX ADMIN — Medication 1 PACKET: at 20:46

## 2021-06-05 RX ADMIN — IPRATROPIUM BROMIDE AND ALBUTEROL SULFATE 3 ML: 2.5; .5 SOLUTION RESPIRATORY (INHALATION) at 02:18

## 2021-06-05 RX ADMIN — Medication 10 ML: at 04:06

## 2021-06-05 RX ADMIN — DOCUSATE SODIUM 50 MG AND SENNOSIDES 8.6 MG 1 TABLET: 8.6; 5 TABLET, FILM COATED ORAL at 20:45

## 2021-06-05 RX ADMIN — POTASSIUM CHLORIDE 20 MEQ: 1.5 POWDER, FOR SOLUTION ORAL at 20:46

## 2021-06-05 RX ADMIN — LISINOPRIL 10 MG: 10 TABLET ORAL at 08:02

## 2021-06-05 RX ADMIN — Medication 10 ML: at 08:05

## 2021-06-05 RX ADMIN — IPRATROPIUM BROMIDE AND ALBUTEROL SULFATE 3 ML: 2.5; .5 SOLUTION RESPIRATORY (INHALATION) at 08:58

## 2021-06-05 RX ADMIN — SODIUM CHLORIDE SOLN NEBU 3% 3 ML: 3 NEBU SOLN at 21:17

## 2021-06-05 RX ADMIN — NIMODIPINE 60 MG: 30 SOLUTION ORAL at 04:05

## 2021-06-05 RX ADMIN — Medication 1 PACKET: at 08:08

## 2021-06-05 RX ADMIN — POTASSIUM CHLORIDE 20 MEQ: 1.5 POWDER, FOR SOLUTION ORAL at 12:25

## 2021-06-05 RX ADMIN — INSULIN ASPART 1 UNITS: 100 INJECTION, SOLUTION INTRAVENOUS; SUBCUTANEOUS at 04:18

## 2021-06-05 RX ADMIN — Medication 10 ML: at 00:10

## 2021-06-05 RX ADMIN — CEFEPIME 2 G: 2 INJECTION, POWDER, FOR SOLUTION INTRAVENOUS at 02:17

## 2021-06-05 RX ADMIN — IPRATROPIUM BROMIDE AND ALBUTEROL SULFATE 3 ML: 2.5; .5 SOLUTION RESPIRATORY (INHALATION) at 21:17

## 2021-06-05 RX ADMIN — NYSTATIN 500000 UNITS: 500000 SUSPENSION ORAL at 08:02

## 2021-06-05 RX ADMIN — MEROPENEM 2 G: 1 INJECTION, POWDER, FOR SOLUTION INTRAVENOUS at 18:57

## 2021-06-05 RX ADMIN — LABETALOL HYDROCHLORIDE 20 MG: 5 INJECTION, SOLUTION INTRAVENOUS at 20:33

## 2021-06-05 RX ADMIN — IPRATROPIUM BROMIDE AND ALBUTEROL SULFATE 3 ML: 2.5; .5 SOLUTION RESPIRATORY (INHALATION) at 14:26

## 2021-06-05 RX ADMIN — GUAIFENESIN 10 ML: 100 SOLUTION ORAL at 16:06

## 2021-06-05 RX ADMIN — SODIUM CHLORIDE SOLN NEBU 3% 3 ML: 3 NEBU SOLN at 14:26

## 2021-06-05 RX ADMIN — NIMODIPINE 60 MG: 30 SOLUTION ORAL at 00:09

## 2021-06-05 RX ADMIN — MEROPENEM 1 G: 1 INJECTION, POWDER, FOR SOLUTION INTRAVENOUS at 10:21

## 2021-06-05 RX ADMIN — GUAIFENESIN 10 ML: 100 SOLUTION ORAL at 21:06

## 2021-06-05 RX ADMIN — POTASSIUM CHLORIDE 20 MEQ: 1.5 POWDER, FOR SOLUTION ORAL at 06:18

## 2021-06-05 RX ADMIN — HEPARIN SODIUM 5000 UNITS: 5000 INJECTION, SOLUTION INTRAVENOUS; SUBCUTANEOUS at 16:06

## 2021-06-05 RX ADMIN — POLYETHYLENE GLYCOL 3350 17 G: 17 POWDER, FOR SOLUTION ORAL at 08:07

## 2021-06-05 RX ADMIN — INSULIN ASPART 1 UNITS: 100 INJECTION, SOLUTION INTRAVENOUS; SUBCUTANEOUS at 08:04

## 2021-06-05 RX ADMIN — DOXAZOSIN 1 MG: 1 TABLET ORAL at 08:02

## 2021-06-05 ASSESSMENT — ACTIVITIES OF DAILY LIVING (ADL)
ADLS_ACUITY_SCORE: 24

## 2021-06-05 ASSESSMENT — MIFFLIN-ST. JEOR: SCORE: 1495.13

## 2021-06-05 NOTE — PROGRESS NOTES
Neuroscience Intensive Care Progress Note  2021    REASON FOR CRITICAL CARE ADMISSION: Subarachnoid hemorrhage due to ruptured anerurysm     ADMITTING PHYSICIAN: Jamin Martinez MD     Date of Service (when I saw the patient): 21     Problem List  1. Aneurysmal subarachnoid hemorrhage, HH 5, MF 4, bleed day 5/15  2. 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 Hydrocephalus status post EVD 5/15  4. Hypertension  5. Acute hypoxic respiratory failure  6. Leukocytosis  7. Normocytic anemia  8. Right frontal, left frontoparietal, and right superior parietal infarcts  9. Vasospasms S/p Verapamil on     24 hour events:  EVD malfunction, replaced  by NSGY. Remains extubated, however, low threshold for reintubation.     24 Hour Vital Signs Summary:  Temperatures:  Current - Temp: 97.9  F (36.6  C); Max - Temp  Av.1  F (36.7  C)  Min: 97.9  F (36.6  C)  Max: 98.4  F (36.9  C)  Respiration range: Resp  Av.5  Min: 20  Max: 28  Pulse range: Pulse  Av.1  Min: 65  Max: 96  Blood pressure range: Systolic (24hrs), Av , Min:121 , Max:155   ; Diastolic (24hrs), Av, Min:75, Max:98  Arterial Line BP: (112-181)/() 181/111  MAP:  [73 mmHg-137 mmHg] 137 mmHg  BP - Mean:  [] 101  Pulse oximetry range: SpO2  Av.1 %  Min: 89 %  Max: 99 %    FiO2 (%): 100 %  Resp: 28        Intake/Output Summary (Last 24 hours) at 2021 0936  Last data filed at 2021 0800  Gross per 24 hour   Intake 3308.2 ml   Output 2793 ml   Net 515.2 ml       Current Medications:    ceFEPIme (MAXIPIME) IV  2 g Intravenous Q8H     doxazosin  1 mg Oral or Feeding Tube Daily     guaiFENesin  10 mL Oral or Feeding Tube Q6H     [Held by provider] heparin ANTICOAGULANT  5,000 Units Subcutaneous Q8H     insulin aspart  1-12 Units Subcutaneous Q4H     ipratropium - albuterol 0.5 mg/2.5 mg/3 mL  3 mL Nebulization Q6H     lisinopril  10 mg Oral or Feeding Tube Daily     midazolam  1-2 mg  "Intravenous Once     niMODipine  60 mg Oral or Feeding Tube Q4H MARAH    And     sodium chloride 0.9 %  10 mL Oral or Feeding Tube Q4H MARAH     nystatin  500,000 Units Mouth/Throat 4x Daily     polyethylene glycol  17 g Oral or Feeding Tube Daily     potassium chloride  20 mEq Oral or Feeding Tube BID     protein modular  1 packet Per Feeding Tube TID     senna-docusate  1 tablet Oral or Feeding Tube BID    Or     senna-docusate  2 tablet Oral or Feeding Tube BID     sodium chloride  3 mL Nebulization Q6H     vancomycin (VANCOCIN) IV  1,750 mg (central catheter) Intravenous Q12H       PRN Medications:  acetaminophen, bisacodyl, hydrALAZINE, labetalol, naloxone **OR** naloxone **OR** naloxone **OR** naloxone, ondansetron, oxyCODONE, prochlorperazine    Infusions:    milrinone 0.5 mcg/kg/min (06/05/21 0802)       No Known Allergies    Physical Examination:  Vitals: BP (!) 140/86   Pulse 82   Temp 97.9  F (36.6  C) (Axillary)   Resp 28   Ht 1.803 m (5' 11\")   Wt 70.3 kg (154 lb 15.7 oz)   SpO2 99%   BMI 21.62 kg/m    General: Adult male patient, lying in bed, NAD  HEENT: Normocephalic/Atraumatic, no icterus, Oral cavity/Oropharynx pink and moist  Cardiac: RRR  Chest: No SOB, pattern regular, unlabored, expansion symmetric, no retractions or use of accessory muscles  Abdomen: Soft, bowel sounds present  Extremities: Warm, no edema  Skin: No rash or lesion   Psych: Calm  Neuro:  Mental status: Drowsy. Will open eyes spontaneously and voice. Attempts to follow commands.    Cranial nerves: PERRL, conjugate gaze, EOMI, pupils +4 round and reactive, facial sensation intact, face symmetric. 1-2 word sentences, hoarse sounding speech.   Motor: Normal bulk and tone. No abnormal movements. 3/5 strength in RUE, RLE, LLE. 4/5 strength in LUE, with purposeful movements.   Sensory: Withdraws to painful stimulus x4  Coordination: KWAME, deferred  Gait: KWAME, deferred.       Labs/Studies:  Recent Labs   Lab Test 06/05/21  0425 " 06/04/21  0346 06/03/21  0334    137 136   POTASSIUM 3.8 3.5 3.9   CHLORIDE 106 103 105   CO2 26 27 27   ANIONGAP 4 7 5   * 144* 164*   BUN 25 20 22   CR 0.42* 0.42* 0.44*   RAGINI 9.6 9.8 8.8   WBC 16.3* 17.3* 14.3*   RBC 3.12* 3.12* 2.61*   HGB 9.6* 9.6* 7.9*   * 860* 776*       Recent Labs   Lab Test 05/15/21  2125 05/15/21  1222 05/15/21  0930   INR 1.20* 1.16* 1.07   PTT 30 29 27       Recent Labs   Lab 06/05/21  0752 06/04/21  1633 06/04/21  0346 06/03/21  2343   PH 7.47* 7.48* 7.50* 7.52*   PCO2 38 39 39 35   PO2 56* 65* 59* 113*   HCO3 28 29* 30* 29*   O2PER 7L 4L 3L 4L         Imaging:  XR CHEST PORT 1 VW 6/5/21 at 0950  1. No significant change in the streaky perihilar opacities, likely  representing atelectasis and/or pulmonary edema.    CT HEAD W/O CONTRAST 6/5/21 at 0530  Impression:   1. Left frontal approach or ventriculostomy tube tip in the foramen of  Monro. The ventricular system is mildly enlarged in comparison to  6/4/2020 exam as above.  2. Postsurgical changes of right frontal craniotomy. Mild progression  of hypoattenuation changes in gray-white matter junction in bilateral  cerebral hemispheres, right greater than left. Stable to subtle  increase right to leftward midline shift.   3. Mixed heterogeneous hyperdensity extra-axial fluid collection along  the right cerebral convexity has mildly increased.   4. Shifting subarachnoid hemorrhage.    XR CHEST PORT 1 VW 6/5/21 at 0345  Impression:  1. Mild improvement of perihilar fullness and mixed interstitial and  airspace opacity in lung bases, suggesting improved edema. Infection  is in the differential.  2. Enteric feeding tube forms loops within the proximal stomach with  tip pointing at gastroesophageal junction. Consider adjustment if  clinically indicated.    Assessment/Plan  Dayron PINO Joseloluis is a 70 year old admitted on 5/15/2021 with subarachnoid hemorrhage (MF4, HH5) and brain compression with midline shift R>L 2/2  ruptured P-comm aneurysm. Now S/p right P-comm aneurysm clipping on 5/15. Hospital course c/b acute respiratory failure with concern for aspiration PNA, S/p re-intubation on 5/19, right frontal, left frontoparietal, and right superior parietal infarcts on CT head 5/28, and vasospasms S/p b/l ICA verapamil on 5/30.    Plan  Neuro:  #Subarachnoid hemorrhage (MF4, HH5), aneurysmal: PBD#21  #Ruptured R PCOM aneurysm  Cerebral angiogram on 5/15 with confirmation of rupture  POD#21 for R craniotomy and clipping of PCOM aneurysm  - Neuro checks q2h (q4h at night)  - Nimodipine course completed   - Daily TCD checks completed  - HOB > 30 degrees    #Right frontal, left frontoparietal, and right superior parietal infarcts on CT head 5/28  #Bilateral Delayed Cerebral Ischemia, likely from vasospasm  Angio 5/30 with mild vasospasm, s/p b/l ICA verapamil  CTA 5/30 with diffuse b/I anterior and posterior circulation vasospasm; now with Milrinone and Norepinephrine  - Milrinone 0.25 mcg/kg/min  - CPP 60-70    #IVH with subsequent hydrocephalus  #Brain compression with midline shift R>L  EVD placed 5/15, replaced 6/5 2/2 EVD malfunction  - EVD @ 10' open, ICP 6-11, drained 118 ml's in the last 24hrs    #Agitation  - Seroquel 12.5 mg BID PRN  - Delirium precautions    #Analgesics  - Tylenol 650 mg q4h PRN  - Oxycodone 5 mg q4h PRN    CV:  #HTN  ECHO completed on 5/17, EF 60-65%  - SBP < 200  - PTA Lisinopril 10 mg daily  - Hydralazine and Labetalol PRN    See Neuro  - Milrinone 0.25 mcg/kg/daily    Pulm:  #Aspiration pneumonia  #Thick secretions  Extubated 6/3  - On high flow oxygenation  - ABG this afternoon  - Continue with pulmonary hygiene  - Duonebs q6h  - Guaifenesin q6h  - 3% saline nebs q6h  - Chest Physiotherapy q6h  - Continuous pulse ox monitoring  - Maintain O2 saturations > 92%    GI/Nutrition:  #Severe malnutrition in the context of acute on chronic illness  - Protein modular 1 Pkt  - Multivitamins with minerals    -  Diet: TF @ goal (60)  - NG  - Last BM-- 6/4  Bowel regimen: Scheduled Senna-docusate and Miralax daily     Renal:  #Hypercholemia ~ resolved  - IV Fluids 50 ml q2h  #Hypokalemia ~ resolved  - Potassium chloride 20 mEq BID    #Urinary retention  - Bladder scan PRN  - Doxazosin 1 mg daily    - IV fluids: None  - BMP daily  - Electrolyte replacement protocol in place    Endo:  #Hyperglycemia  - Follow glucose levels    Heme:  #Leukocytosis: 14.3-->17.3-->16.3  #Normocytic anemia: 9.6  #Thrombocytosis: 776-->860-->726  - CBC daily    ID:  #Aspiration pneumonia  #Probable Ventriculitis  #5/22 sputum with moderate growth pseudomonas, klebsiella which is pansensitive  - 5/28, 5/25, 5/19 pancultured for only heavy growth pseudomonas, Klebsiella oxytoca, Staphylococcus epidermidis; heavy growth beta hemolytic Streptococcus constellatus  - Ceftriaxone (5/21-5/23) was switched to Zosyn (5/23-5/28)  -->switch to Cefepime and Vancomycin for presumed ventriculitis given elevated WBC/RBC ration from CSF cell lines  #Concern for ESBL on 6/1 sputum culture (growth discovered 6/4)  -->switched from Cefepime to Meropenum to cover for ESBL and ventriculitis (Day 9/14)  - 6/1 BC x 2 with NGTD  - 6/1 Sputum culture--prelim with moderate growth pseudomonas aeruginosa, heavy growth staphylococcus epidermidis, light growth lactose fermenting gram negative rods  - 6/1 CSF RBC 31,000; ; Glucose 73; Protein 71  - CSF culture--prelim negative  - Follow fever curve    PPX:  DVT Prophylaxis  - SCDs in place  - Heparin subcutaneous 5000 mg q8h  GI Prophylaxis  - Not indicated    Lines/Tubes/Drains:  - PIV x 2  - L subclavian CVC/TL 5/15  - R arterial line 5/27  - NG   - EVD    Code Status: Full Code    Dispo: ICU    The patient was seen and discussed with the attending, Dr. Starr.    Madelaine Rivera, Nurse Practitioner Student    I, Audelia Umanzor, was present with the nurse practitioner student who participated in the service and in the  documentation of the note.  I have verified the history and personally performed the physical exam and medical decision making.  I agree with the assessment and plan of care as documented in the note.      Audelia LAIRD CNP  NeuroCritical Care Nurse Practitioner  Pager: [phone]  Ascom: [phone] available M- [phone]

## 2021-06-05 NOTE — PLAN OF CARE
Major shift events: Neuros unchanged. Following commands intermittently, slow to respond. Opens eyes to speech. L pupil > R briskly reactive. ICP 1-11 Output 0-11, NSS aware, head CT completed. Flushed per provider x2. SBP less than 200 without intervention. Lungs coarse/dim on 4 L oxymask, thick creamy secretions, strong cough.  Tube feeds at goa. Primafit in place with adequate UOP. Milniorne at 0.5. Notified by lab bing sputum collected 6/1 growing possible ESBL, NCC notified and will follow up when culture final.    Plan: Neuro Q2h days and Q4H NOC. Monitor ICP and outputs.

## 2021-06-06 ENCOUNTER — ANESTHESIA EVENT (OUTPATIENT)
Dept: INTENSIVE CARE | Facility: CLINIC | Age: 71
DRG: 003 | End: 2021-06-06
Payer: COMMERCIAL

## 2021-06-06 ENCOUNTER — APPOINTMENT (OUTPATIENT)
Dept: GENERAL RADIOLOGY | Facility: CLINIC | Age: 71
DRG: 003 | End: 2021-06-06
Attending: STUDENT IN AN ORGANIZED HEALTH CARE EDUCATION/TRAINING PROGRAM
Payer: COMMERCIAL

## 2021-06-06 ENCOUNTER — ANESTHESIA (OUTPATIENT)
Dept: INTENSIVE CARE | Facility: CLINIC | Age: 71
DRG: 003 | End: 2021-06-06
Payer: COMMERCIAL

## 2021-06-06 ENCOUNTER — APPOINTMENT (OUTPATIENT)
Dept: CT IMAGING | Facility: CLINIC | Age: 71
DRG: 003 | End: 2021-06-06
Attending: STUDENT IN AN ORGANIZED HEALTH CARE EDUCATION/TRAINING PROGRAM
Payer: COMMERCIAL

## 2021-06-06 LAB
ANION GAP SERPL CALCULATED.3IONS-SCNC: 5 MMOL/L (ref 3–14)
BACTERIA SPEC CULT: ABNORMAL
BACTERIA SPEC CULT: NO GROWTH
BASE EXCESS BLDA CALC-SCNC: 5 MMOL/L
BASE EXCESS BLDA CALC-SCNC: 6.7 MMOL/L
BUN SERPL-MCNC: 24 MG/DL (ref 7–30)
CALCIUM SERPL-MCNC: 9.6 MG/DL (ref 8.5–10.1)
CHLORIDE SERPL-SCNC: 105 MMOL/L (ref 94–109)
CO2 SERPL-SCNC: 30 MMOL/L (ref 20–32)
CREAT SERPL-MCNC: 0.47 MG/DL (ref 0.66–1.25)
ERYTHROCYTE [DISTWIDTH] IN BLOOD BY AUTOMATED COUNT: 14.1 % (ref 10–15)
GFR SERPL CREATININE-BSD FRML MDRD: >90 ML/MIN/{1.73_M2}
GLUCOSE BLDC GLUCOMTR-MCNC: 126 MG/DL (ref 70–99)
GLUCOSE BLDC GLUCOMTR-MCNC: 128 MG/DL (ref 70–99)
GLUCOSE BLDC GLUCOMTR-MCNC: 133 MG/DL (ref 70–99)
GLUCOSE BLDC GLUCOMTR-MCNC: 133 MG/DL (ref 70–99)
GLUCOSE BLDC GLUCOMTR-MCNC: 146 MG/DL (ref 70–99)
GLUCOSE BLDC GLUCOMTR-MCNC: 149 MG/DL (ref 70–99)
GLUCOSE SERPL-MCNC: 148 MG/DL (ref 70–99)
HCO3 BLD-SCNC: 31 MMOL/L (ref 21–28)
HCO3 BLD-SCNC: 31 MMOL/L (ref 21–28)
HCT VFR BLD AUTO: 29.2 % (ref 40–53)
HGB BLD-MCNC: 9.2 G/DL (ref 13.3–17.7)
Lab: ABNORMAL
MAGNESIUM SERPL-MCNC: 2.5 MG/DL (ref 1.6–2.3)
MCH RBC QN AUTO: 30.4 PG (ref 26.5–33)
MCHC RBC AUTO-ENTMCNC: 31.5 G/DL (ref 31.5–36.5)
MCV RBC AUTO: 96 FL (ref 78–100)
O2/TOTAL GAS SETTING VFR VENT: 60 %
O2/TOTAL GAS SETTING VFR VENT: ABNORMAL %
OXYHGB MFR BLD: 92 % (ref 92–100)
OXYHGB MFR BLD: 98 % (ref 92–100)
PCO2 BLD: 40 MM HG (ref 35–45)
PCO2 BLD: 51 MM HG (ref 35–45)
PH BLD: 7.39 PH (ref 7.35–7.45)
PH BLD: 7.49 PH (ref 7.35–7.45)
PHOSPHATE SERPL-MCNC: 3.1 MG/DL (ref 2.5–4.5)
PLATELET # BLD AUTO: 730 10E9/L (ref 150–450)
PO2 BLD: 161 MM HG (ref 80–105)
PO2 BLD: 71 MM HG (ref 80–105)
POTASSIUM SERPL-SCNC: 3.8 MMOL/L (ref 3.4–5.3)
RBC # BLD AUTO: 3.03 10E12/L (ref 4.4–5.9)
SODIUM SERPL-SCNC: 139 MMOL/L (ref 133–144)
SPECIMEN SOURCE: ABNORMAL
SPECIMEN SOURCE: NORMAL
VANCOMYCIN SERPL-MCNC: 18.9 MG/L
WBC # BLD AUTO: 25.2 10E9/L (ref 4–11)

## 2021-06-06 PROCEDURE — 70450 CT HEAD/BRAIN W/O DYE: CPT

## 2021-06-06 PROCEDURE — 70450 CT HEAD/BRAIN W/O DYE: CPT | Mod: 77

## 2021-06-06 PROCEDURE — 94668 MNPJ CHEST WALL SBSQ: CPT

## 2021-06-06 PROCEDURE — 250N000009 HC RX 250: Performed by: PSYCHIATRY & NEUROLOGY

## 2021-06-06 PROCEDURE — 999N000157 HC STATISTIC RCP TIME EA 10 MIN

## 2021-06-06 PROCEDURE — 272N000059

## 2021-06-06 PROCEDURE — 70450 CT HEAD/BRAIN W/O DYE: CPT | Mod: 26 | Performed by: RADIOLOGY

## 2021-06-06 PROCEDURE — 80048 BASIC METABOLIC PNL TOTAL CA: CPT | Performed by: STUDENT IN AN ORGANIZED HEALTH CARE EDUCATION/TRAINING PROGRAM

## 2021-06-06 PROCEDURE — 009630Z DRAINAGE OF CEREBRAL VENTRICLE WITH DRAINAGE DEVICE, PERCUTANEOUS APPROACH: ICD-10-PCS | Performed by: NEUROLOGICAL SURGERY

## 2021-06-06 PROCEDURE — U0003 INFECTIOUS AGENT DETECTION BY NUCLEIC ACID (DNA OR RNA); SEVERE ACUTE RESPIRATORY SYNDROME CORONAVIRUS 2 (SARS-COV-2) (CORONAVIRUS DISEASE [COVID-19]), AMPLIFIED PROBE TECHNIQUE, MAKING USE OF HIGH THROUGHPUT TECHNOLOGIES AS DESCRIBED BY CMS-2020-01-R: HCPCS | Performed by: STUDENT IN AN ORGANIZED HEALTH CARE EDUCATION/TRAINING PROGRAM

## 2021-06-06 PROCEDURE — 00P630Z REMOVAL OF DRAINAGE DEVICE FROM CEREBRAL VENTRICLE, PERCUTANEOUS APPROACH: ICD-10-PCS | Performed by: NEUROLOGICAL SURGERY

## 2021-06-06 PROCEDURE — 71045 X-RAY EXAM CHEST 1 VIEW: CPT | Mod: 26 | Performed by: STUDENT IN AN ORGANIZED HEALTH CARE EDUCATION/TRAINING PROGRAM

## 2021-06-06 PROCEDURE — 250N000013 HC RX MED GY IP 250 OP 250 PS 637: Performed by: NURSE PRACTITIONER

## 2021-06-06 PROCEDURE — 250N000011 HC RX IP 250 OP 636: Performed by: NEUROLOGICAL SURGERY

## 2021-06-06 PROCEDURE — 250N000013 HC RX MED GY IP 250 OP 250 PS 637: Performed by: NEUROLOGICAL SURGERY

## 2021-06-06 PROCEDURE — 85027 COMPLETE CBC AUTOMATED: CPT | Performed by: STUDENT IN AN ORGANIZED HEALTH CARE EDUCATION/TRAINING PROGRAM

## 2021-06-06 PROCEDURE — 258N000003 HC RX IP 258 OP 636: Performed by: STUDENT IN AN ORGANIZED HEALTH CARE EDUCATION/TRAINING PROGRAM

## 2021-06-06 PROCEDURE — 83735 ASSAY OF MAGNESIUM: CPT | Performed by: STUDENT IN AN ORGANIZED HEALTH CARE EDUCATION/TRAINING PROGRAM

## 2021-06-06 PROCEDURE — 250N000011 HC RX IP 250 OP 636: Performed by: STUDENT IN AN ORGANIZED HEALTH CARE EDUCATION/TRAINING PROGRAM

## 2021-06-06 PROCEDURE — 258N000003 HC RX IP 258 OP 636: Performed by: NURSE PRACTITIONER

## 2021-06-06 PROCEDURE — 71045 X-RAY EXAM CHEST 1 VIEW: CPT

## 2021-06-06 PROCEDURE — 999N000076 HC STATISTIC ICP MONITORING

## 2021-06-06 PROCEDURE — 250N000013 HC RX MED GY IP 250 OP 250 PS 637: Performed by: STUDENT IN AN ORGANIZED HEALTH CARE EDUCATION/TRAINING PROGRAM

## 2021-06-06 PROCEDURE — 250N000009 HC RX 250: Performed by: NURSE PRACTITIONER

## 2021-06-06 PROCEDURE — 200N000002 HC R&B ICU UMMC

## 2021-06-06 PROCEDURE — 258N000003 HC RX IP 258 OP 636: Performed by: NEUROLOGICAL SURGERY

## 2021-06-06 PROCEDURE — 94002 VENT MGMT INPAT INIT DAY: CPT

## 2021-06-06 PROCEDURE — 250N000009 HC RX 250: Performed by: STUDENT IN AN ORGANIZED HEALTH CARE EDUCATION/TRAINING PROGRAM

## 2021-06-06 PROCEDURE — 82805 BLOOD GASES W/O2 SATURATION: CPT | Performed by: STUDENT IN AN ORGANIZED HEALTH CARE EDUCATION/TRAINING PROGRAM

## 2021-06-06 PROCEDURE — 999N001017 HC STATISTIC GLUCOSE BY METER IP

## 2021-06-06 PROCEDURE — 250N000011 HC RX IP 250 OP 636

## 2021-06-06 PROCEDURE — 80202 ASSAY OF VANCOMYCIN: CPT | Performed by: NEUROLOGICAL SURGERY

## 2021-06-06 PROCEDURE — 36415 COLL VENOUS BLD VENIPUNCTURE: CPT | Performed by: NEUROLOGICAL SURGERY

## 2021-06-06 PROCEDURE — U0005 INFEC AGEN DETEC AMPLI PROBE: HCPCS | Performed by: STUDENT IN AN ORGANIZED HEALTH CARE EDUCATION/TRAINING PROGRAM

## 2021-06-06 PROCEDURE — 99291 CRITICAL CARE FIRST HOUR: CPT | Mod: GC | Performed by: PSYCHIATRY & NEUROLOGY

## 2021-06-06 PROCEDURE — 250N000009 HC RX 250

## 2021-06-06 PROCEDURE — 370N000003 HC ANESTHESIA WARD SERVICE

## 2021-06-06 PROCEDURE — 71045 X-RAY EXAM CHEST 1 VIEW: CPT | Mod: 26 | Performed by: RADIOLOGY

## 2021-06-06 PROCEDURE — 999N000065 XR CHEST PORT 1 VIEW

## 2021-06-06 PROCEDURE — 84100 ASSAY OF PHOSPHORUS: CPT | Performed by: STUDENT IN AN ORGANIZED HEALTH CARE EDUCATION/TRAINING PROGRAM

## 2021-06-06 PROCEDURE — 82805 BLOOD GASES W/O2 SATURATION: CPT | Performed by: NEUROLOGICAL SURGERY

## 2021-06-06 PROCEDURE — 94640 AIRWAY INHALATION TREATMENT: CPT | Mod: 76

## 2021-06-06 RX ORDER — PROPOFOL 10 MG/ML
INJECTION, EMULSION INTRAVENOUS
Status: COMPLETED
Start: 2021-06-06 | End: 2021-06-06

## 2021-06-06 RX ORDER — NOREPINEPHRINE BITARTRATE 0.06 MG/ML
INJECTION, SOLUTION INTRAVENOUS
Status: COMPLETED
Start: 2021-06-06 | End: 2021-06-06

## 2021-06-06 RX ORDER — CEFAZOLIN SODIUM 2 G/100ML
2 INJECTION, SOLUTION INTRAVENOUS SEE ADMIN INSTRUCTIONS
Status: CANCELLED | OUTPATIENT
Start: 2021-06-06

## 2021-06-06 RX ORDER — LIDOCAINE HYDROCHLORIDE AND EPINEPHRINE 10; 10 MG/ML; UG/ML
10 INJECTION, SOLUTION INFILTRATION; PERINEURAL ONCE
Status: COMPLETED | OUTPATIENT
Start: 2021-06-06 | End: 2021-06-06

## 2021-06-06 RX ORDER — CEFAZOLIN SODIUM 2 G/100ML
2 INJECTION, SOLUTION INTRAVENOUS
Status: CANCELLED | OUTPATIENT
Start: 2021-06-06

## 2021-06-06 RX ORDER — POTASSIUM CHLORIDE 1.5 G/1.58G
20 POWDER, FOR SOLUTION ORAL ONCE
Status: COMPLETED | OUTPATIENT
Start: 2021-06-06 | End: 2021-06-06

## 2021-06-06 RX ORDER — DEXMEDETOMIDINE HYDROCHLORIDE 4 UG/ML
0.2-0.7 INJECTION, SOLUTION INTRAVENOUS CONTINUOUS
Status: DISCONTINUED | OUTPATIENT
Start: 2021-06-06 | End: 2021-06-08

## 2021-06-06 RX ORDER — LIDOCAINE HYDROCHLORIDE AND EPINEPHRINE 10; 10 MG/ML; UG/ML
INJECTION, SOLUTION INFILTRATION; PERINEURAL
Status: COMPLETED
Start: 2021-06-06 | End: 2021-06-06

## 2021-06-06 RX ORDER — PROPOFOL 10 MG/ML
5-75 INJECTION, EMULSION INTRAVENOUS CONTINUOUS
Status: DISCONTINUED | OUTPATIENT
Start: 2021-06-06 | End: 2021-06-06

## 2021-06-06 RX ORDER — PROPOFOL 10 MG/ML
INJECTION, EMULSION INTRAVENOUS
Status: DISCONTINUED | OUTPATIENT
Start: 2021-06-06 | End: 2021-06-06

## 2021-06-06 RX ORDER — NOREPINEPHRINE BITARTRATE 0.06 MG/ML
.01-.6 INJECTION, SOLUTION INTRAVENOUS CONTINUOUS
Status: DISCONTINUED | OUTPATIENT
Start: 2021-06-06 | End: 2021-06-08

## 2021-06-06 RX ADMIN — LIDOCAINE HYDROCHLORIDE AND EPINEPHRINE 3 ML: 10; 10 INJECTION, SOLUTION INFILTRATION; PERINEURAL at 09:06

## 2021-06-06 RX ADMIN — MEROPENEM 2 G: 1 INJECTION, POWDER, FOR SOLUTION INTRAVENOUS at 11:41

## 2021-06-06 RX ADMIN — NYSTATIN 500000 UNITS: 500000 SUSPENSION ORAL at 20:21

## 2021-06-06 RX ADMIN — GUAIFENESIN 10 ML: 100 SOLUTION ORAL at 23:16

## 2021-06-06 RX ADMIN — POTASSIUM CHLORIDE 20 MEQ: 1.5 POWDER, FOR SOLUTION ORAL at 07:55

## 2021-06-06 RX ADMIN — PROPOFOL 10 MCG/KG/MIN: 10 INJECTION, EMULSION INTRAVENOUS at 07:07

## 2021-06-06 RX ADMIN — INSULIN ASPART 1 UNITS: 100 INJECTION, SOLUTION INTRAVENOUS; SUBCUTANEOUS at 00:24

## 2021-06-06 RX ADMIN — LIDOCAINE HYDROCHLORIDE,EPINEPHRINE BITARTRATE 3 ML: 10; .01 INJECTION, SOLUTION INFILTRATION; PERINEURAL at 09:06

## 2021-06-06 RX ADMIN — POTASSIUM CHLORIDE 20 MEQ: 1.5 POWDER, FOR SOLUTION ORAL at 20:21

## 2021-06-06 RX ADMIN — NYSTATIN 500000 UNITS: 500000 SUSPENSION ORAL at 11:45

## 2021-06-06 RX ADMIN — Medication 0.03 MCG/KG/MIN: at 09:15

## 2021-06-06 RX ADMIN — GUAIFENESIN 10 ML: 100 SOLUTION ORAL at 11:18

## 2021-06-06 RX ADMIN — SODIUM CHLORIDE 1000 ML: 9 INJECTION, SOLUTION INTRAVENOUS at 06:55

## 2021-06-06 RX ADMIN — NYSTATIN 500000 UNITS: 500000 SUSPENSION ORAL at 16:12

## 2021-06-06 RX ADMIN — MILRINONE LACTATE IN DEXTROSE 0.25 MCG/KG/MIN: 200 INJECTION, SOLUTION INTRAVENOUS at 00:06

## 2021-06-06 RX ADMIN — SODIUM CHLORIDE SOLN NEBU 3% 3 ML: 3 NEBU SOLN at 08:13

## 2021-06-06 RX ADMIN — Medication 1 PACKET: at 14:16

## 2021-06-06 RX ADMIN — ROCURONIUM BROMIDE 100 MG: 10 INJECTION INTRAVENOUS at 06:45

## 2021-06-06 RX ADMIN — DEXMEDETOMIDINE 0.3 MCG/KG/HR: 100 INJECTION, SOLUTION, CONCENTRATE INTRAVENOUS at 16:03

## 2021-06-06 RX ADMIN — DOCUSATE SODIUM 50 MG AND SENNOSIDES 8.6 MG 1 TABLET: 8.6; 5 TABLET, FILM COATED ORAL at 20:21

## 2021-06-06 RX ADMIN — SODIUM CHLORIDE SOLN NEBU 3% 3 ML: 3 NEBU SOLN at 15:36

## 2021-06-06 RX ADMIN — IPRATROPIUM BROMIDE AND ALBUTEROL SULFATE 3 ML: 2.5; .5 SOLUTION RESPIRATORY (INHALATION) at 21:50

## 2021-06-06 RX ADMIN — SODIUM CHLORIDE SOLN NEBU 3% 3 ML: 3 NEBU SOLN at 01:53

## 2021-06-06 RX ADMIN — MEROPENEM 2 G: 1 INJECTION, POWDER, FOR SOLUTION INTRAVENOUS at 18:15

## 2021-06-06 RX ADMIN — MEROPENEM 2 G: 1 INJECTION, POWDER, FOR SOLUTION INTRAVENOUS at 02:53

## 2021-06-06 RX ADMIN — Medication 0.05 MCG/KG/MIN: at 09:10

## 2021-06-06 RX ADMIN — SODIUM CHLORIDE 500 ML: 9 INJECTION, SOLUTION INTRAVENOUS at 12:10

## 2021-06-06 RX ADMIN — NYSTATIN 500000 UNITS: 500000 SUSPENSION ORAL at 07:56

## 2021-06-06 RX ADMIN — PROPOFOL 40 MCG/KG/MIN: 10 INJECTION, EMULSION INTRAVENOUS at 08:54

## 2021-06-06 RX ADMIN — Medication 0.03 MCG/KG/MIN: at 11:35

## 2021-06-06 RX ADMIN — VANCOMYCIN HYDROCHLORIDE 1750 MG: 10 INJECTION, POWDER, LYOPHILIZED, FOR SOLUTION INTRAVENOUS at 05:30

## 2021-06-06 RX ADMIN — ALTEPLASE 2 MG: 2.2 INJECTION, POWDER, LYOPHILIZED, FOR SOLUTION INTRAVENOUS at 22:50

## 2021-06-06 RX ADMIN — DOXAZOSIN 1 MG: 1 TABLET ORAL at 07:56

## 2021-06-06 RX ADMIN — GUAIFENESIN 10 ML: 100 SOLUTION ORAL at 16:12

## 2021-06-06 RX ADMIN — SODIUM CHLORIDE SOLN NEBU 3% 3 ML: 3 NEBU SOLN at 21:50

## 2021-06-06 RX ADMIN — INSULIN ASPART 1 UNITS: 100 INJECTION, SOLUTION INTRAVENOUS; SUBCUTANEOUS at 04:03

## 2021-06-06 RX ADMIN — Medication 1 PACKET: at 20:21

## 2021-06-06 RX ADMIN — IPRATROPIUM BROMIDE AND ALBUTEROL SULFATE 3 ML: 2.5; .5 SOLUTION RESPIRATORY (INHALATION) at 01:55

## 2021-06-06 RX ADMIN — LISINOPRIL 10 MG: 10 TABLET ORAL at 07:56

## 2021-06-06 RX ADMIN — IPRATROPIUM BROMIDE AND ALBUTEROL SULFATE 3 ML: 2.5; .5 SOLUTION RESPIRATORY (INHALATION) at 15:36

## 2021-06-06 RX ADMIN — PROPOFOL 90 MG: 10 INJECTION, EMULSION INTRAVENOUS at 06:45

## 2021-06-06 RX ADMIN — IPRATROPIUM BROMIDE AND ALBUTEROL SULFATE 3 ML: 2.5; .5 SOLUTION RESPIRATORY (INHALATION) at 08:13

## 2021-06-06 RX ADMIN — Medication 1 PACKET: at 09:30

## 2021-06-06 RX ADMIN — Medication 0.03 MCG/KG/MIN: at 08:55

## 2021-06-06 RX ADMIN — VANCOMYCIN HYDROCHLORIDE 1750 MG: 10 INJECTION, POWDER, LYOPHILIZED, FOR SOLUTION INTRAVENOUS at 18:14

## 2021-06-06 RX ADMIN — GUAIFENESIN 10 ML: 100 SOLUTION ORAL at 04:03

## 2021-06-06 ASSESSMENT — ACTIVITIES OF DAILY LIVING (ADL)
ADLS_ACUITY_SCORE: 24

## 2021-06-06 NOTE — PLAN OF CARE
Major shift events: neuro status unchanged. EVD 20 above ICP 5-11, no output, waveform dampened, SNG aware. Head CT done. R pupil 6-7, L 5, both briskly reactive. Labatelol x1 for SBP >200 with effectiveness. HFNC 50L 65%. Desat to 85%, FiO2 increased to 80%, able to wean back down to 65%. Decreased cough, occasional thick creamy white secretions. TF at goal. Rectal pouch with output this shift. Primafit in place with adequate UOP. No changes to skin. Milirone at 0.25.     Plan: Neuro checks Q2H during day and Q4H at noc. Possibly remove EVD. Monitor resp status.

## 2021-06-06 NOTE — PROGRESS NOTES
DeSat to 75- 80% at 0645 FiO2 increased to 100% with little improvement. NCC and NSG notified, pt intubated per anesthesia. ETT 8.0 26 at lips. Lungs sounds coarse bilaterally. MD to notify wife. Prop started at 10 after intubation.

## 2021-06-06 NOTE — PHARMACY-VANCOMYCIN DOSING SERVICE
Pharmacy Vancomycin Note  Date of Service 2021  Patient's  1950   70 year old, male    Indication: Ventriculitis  Day of Therapy: 9  Current vancomycin regimen:  1750 mg IV q12h  Current vancomycin monitoring method: Trough (Method 2 = manual dose calculation)  Current vancomycin therapeutic monitoring goal: 15-20 mg/L    Current estimated CrCl = Estimated Creatinine Clearance: 149.6 mL/min (A) (based on SCr of 0.47 mg/dL (L)).    Creatinine for last 3 days  2021:  3:46 AM Creatinine 0.42 mg/dL  2021:  4:25 AM Creatinine 0.42 mg/dL  2021:  3:54 AM Creatinine 0.47 mg/dL    Recent Vancomycin Levels (past 3 days)  2021:  5:43 PM Vancomycin Level 18.9 mg/L    Vancomycin IV Administrations (past 72 hours)                   vancomycin (VANCOCIN) 1,750 mg in sodium chloride 0.9 % 250 mL intermittent infusion (mg) 1,750 mg New Bag 21 1814     1,750 mg New Bag  0530     1,750 mg New Bag 21 1857     1,750 mg New Bag  0554     1,750 mg New Bag 21 1917     1,750 mg New Bag  0614                Nephrotoxins and other renal medications (From now, onward)    Start     Dose/Rate Route Frequency Ordered Stop    21 1230  norepinephrine (LEVOPHED) 16 mg in  mL infusion MAX CONC CENTRAL LINE      0.01-0.6 mcg/kg/min × 69.8 kg (Dosing Weight)  0.7-39.3 mL/hr  Intravenous CONTINUOUS 21 1200      21 0800  [Held by provider]  lisinopril (ZESTRIL) tablet 10 mg     (Held by provider since Sun 2021 at 1204 by Levi Zayas MD.Hold Reason: Change in Vitals.)    10 mg Oral or Feeding Tube DAILY 21 1631      21 1800  vancomycin (VANCOCIN) 1,750 mg in sodium chloride 0.9 % 250 mL intermittent infusion      1,750 mg (central catheter)  over 90 Minutes Intravenous EVERY 12 HOURS 21 1624 21 2359             Contrast Orders - past 72 hours (72h ago, onward)    None          Interpretation of levels and current regimen:  Vancomycin level is  reflective of therapeutic level    Has serum creatinine changed greater than 50% in last 72 hours: No    Urine output:  good urine output    Renal Function: Stable      Plan:  1. Continue Current Dose  2. Vancomycin monitoring method: Trough (Method 2 = manual dose calculation)  3. Vancomycin therapeutic monitoring goal: 15-20 mg/L  4. Pharmacy will check vancomycin levels as appropriate in 1-3 Days.  5. Serum creatinine levels will be ordered daily for the first week of therapy and at least twice weekly for subsequent weeks.    Arnol Madrigal, PharmD, BCPS

## 2021-06-06 NOTE — PROGRESS NOTES
Neuroscience Intensive Care Progress Note  2021    REASON FOR CRITICAL CARE ADMISSION: Subarachnoid hemorrhage due to ruptured anerurysm     ADMITTING PHYSICIAN: Jamin Martinez MD     Date of Service (when I saw the patient): 21    Problem List  1. Aneurysmal subarachnoid hemorrhage, HH 5, MF 4, bleed day 5/15  2. 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 Hydrocephalus status post EVD 5/15  4. Hypertension  5. Acute hypoxic respiratory failure  6. Leukocytosis  7. Normocytic anemia  8. Right frontal, left frontoparietal, and right superior parietal infarcts  9. Vasospasms S/p Verapamil on     24 hour events:  Re-intubated overnight for acute respiratory distress with saturations in the 80's.    24 Hour Vital Signs Summary:  Temperatures:  Current - Temp: 97.5  F (36.4  C); Max - Temp  Av  F (36.7  C)  Min: 97.5  F (36.4  C)  Max: 98.5  F (36.9  C)  Respiration range: Resp  Av.4  Min: 22  Max: 32  Pulse range: Pulse  Av.8  Min: 62  Max: 110  Blood pressure range: Systolic (24hrs), Av , Min:176 , Max:176   ; Diastolic (24hrs), Av, Min:100, Max:100  Arterial Line BP: (119-264)/() 264/264  MAP:  [86 mmHg-263 mmHg] 263 mmHg  BP - Mean:  [114] 114  Pulse oximetry range: SpO2  Av.5 %  Min: 85 %  Max: 99 %    Ventilator Settings  Ventilation Mode: CMV/AC  (Continuous Mandatory Ventilation/ Assist Control)  FiO2 (%): 65 %  Rate Set (breaths/minute): 14 breaths/min  Tidal Volume Set (mL): 450 mL  PEEP (cm H2O): 5 cmH2O  Oxygen Concentration (%): 60 %  Resp: 22        Intake/Output Summary (Last 24 hours) at 2021 0740  Last data filed at 2021 0700  Gross per 24 hour   Intake 2814 ml   Output 1752 ml   Net 1062 ml       Current Medications:    sodium chloride 0.9%  1,000 mL Intravenous Once     doxazosin  1 mg Oral or Feeding Tube Daily     guaiFENesin  10 mL Oral or Feeding Tube Q6H     [Held by provider] heparin ANTICOAGULANT  5,000 Units  "Subcutaneous Q8H     insulin aspart  1-12 Units Subcutaneous Q4H     ipratropium - albuterol 0.5 mg/2.5 mg/3 mL  3 mL Nebulization Q6H     lisinopril  10 mg Oral or Feeding Tube Daily     meropenem  2 g Intravenous Q8H     nystatin  500,000 Units Mouth/Throat 4x Daily     polyethylene glycol  17 g Oral or Feeding Tube Daily     potassium chloride  20 mEq Oral or Feeding Tube Once     potassium chloride  20 mEq Oral or Feeding Tube BID     protein modular  1 packet Per Feeding Tube TID     senna-docusate  1 tablet Oral or Feeding Tube BID    Or     senna-docusate  2 tablet Oral or Feeding Tube BID     sodium chloride  3 mL Nebulization Q6H     vancomycin (VANCOCIN) IV  1,750 mg (central catheter) Intravenous Q12H       PRN Medications:  acetaminophen, bisacodyl, hydrALAZINE, labetalol, naloxone **OR** naloxone **OR** naloxone **OR** naloxone, ondansetron, oxyCODONE, prochlorperazine    Infusions:    milrinone 0.25 mcg/kg/min (06/06/21 0400)     propofol (DIPRIVAN) infusion 10 mcg/kg/min (06/06/21 0707)       No Known Allergies    Physical Examination:  Vitals: BP (!) 176/100 (BP Location: Left arm)   Pulse 96   Temp 97.5  F (36.4  C) (Axillary)   Resp 22   Ht 1.803 m (5' 11\")   Wt 70.3 kg (154 lb 15.7 oz)   SpO2 96%   BMI 21.62 kg/m    General: Adult male patient, lying in bed, NAD  HEENT: Normocephalic/Atraumatic, no icterus, Oral cavity/Oropharynx pink and moist  Cardiac: RRR  Chest: No SOB, pattern regular, unlabored, expansion symmetric, no retractions or use of accessory muscles, assisted mechanically  Abdomen: Soft, bowel sounds present  Extremities: Warm, no edema  Skin: No rash or lesion   Psych: Calm, sedated  Neuro:  Mental status: Sedated. Does not open eyes, does not follow commands.     Cranial nerves: PERRL, conjugate gaze, pupils +3 round and reactive, cough and gag present with deep suction. Intubated.  Motor: Normal bulk and tone. No abnormal movements. 1/5 strength x 4 extremities, withdraws. " Medically sedated.   Sensory: Withdraws to painful stimulus x4  Coordination: KWAME, deferred  Gait: KWAME, deferred.     Labs/Studies:  Recent Labs   Lab Test 21  0354 21  0425 21  0346    136 137   POTASSIUM 3.8 3.8 3.5   CHLORIDE 105 106 103   CO2 30 26 27   ANIONGAP 5 4 7   * 149* 144*   BUN 24 25 20   CR 0.47* 0.42* 0.42*   RAGINI 9.6 9.6 9.8   WBC 25.2* 16.3* 17.3*   RBC 3.03* 3.12* 3.12*   HGB 9.2* 9.6* 9.6*   * 726* 860*       Recent Labs   Lab Test 05/15/21  2125 05/15/21  1222 05/15/21  0930   INR 1.20* 1.16* 1.07   PTT 30 29 27       Recent Labs   Lab 21  0701 21  0449 21  1009 21  0752   PH 7.39 7.49* 7.50* 7.47*   PCO2 51* 40 37 38   PO2 71* 161* 85 56*   HCO3 31* 31* 28 28   O2PER 60 15L PM 60 7L         Imagin. XR Chest Port 1 VW on 21 at 0718  Impression:   1. Endotracheal tube tip projects in the high/mid thoracic trachea  approximately 5.4 cm above the lisha.  2. Gastric tube tip projects over the stomach, the sidehole projects  in the distal esophagus, and the enteric tube is coiled in the  stomach. Recommend advancing both tubes.  3. No significant change in the perihilar patchy pulmonary opacities,  which may represent infection or pulmonary edema.  4. Unchanged retrocardiac opacity. This may represent atelectasis,  aspiration, or infection. Query left pleural effusion.    2. CT Head w/o contrast on 21 at 0508  Impression:   1. Left frontal approach or ventriculostomy tube tip in the foramen of  Monro. Mild shunted ventriculomegaly is similar in size in comparison  to 2021 exam.   2. Postsurgical changes of right frontal craniotomy. Stable  hypoattenuation changes in gray-white matter junction in bilateral  cerebral hemispheres, right greater than left. No significant changes  in midline shift.   3. Mixed heterogeneous hyperdensity extra-axial fluid collection along  the right cerebral convexity is unchanged.   4. Shifting  subarachnoid hemorrhage but overall has decreased in  comparison to 5/31/2012 exam.    3. XR Chest Port 1 VW on 6/6/21 at 0129  Impression:   1. Normal lung volume with mild improved interstitial opacity in  bilateral lungs, pulmonary edema versus atypical  infection/atelectasis.  2. Increasing left lower lobe atelectasis with or without small  effusion.    Assessment/Plan  Dayron Neri is a 70 year old admitted on 5/15/2021 with subarachnoid hemorrhage (MF4, HH5) and brain compression with midline shift R>L 2/2 ruptured P-comm aneurysm. Now S/p right P-comm aneurysm clipping on 5/15. Hospital course c/b acute respiratory failure with concern for aspiration PNA, S/p re-intubation on 5/19, right frontal, left frontoparietal, and right superior parietal infarcts on CT head 5/28, and vasospasms S/p b/l ICA verapamil on 5/30.     Plan  Neuro:  #Subarachnoid hemorrhage (MF4, HH5), aneurysmal: PBD#22  #Ruptured R PCOM aneurysm  Cerebral angiogram on 5/15 with confirmation of rupture  POD#22 for R craniotomy and clipping of PCOM aneurysm  - Neuro checks q2h (q4h at night)  - Nimodipine course completed   - Daily TCD checks completed  - HOB > 30 degrees     #Right frontal, left frontoparietal, and right superior parietal infarcts on CT head 5/28  #Bilateral Delayed Cerebral Ischemia, likely from vasospasm  Angio 5/30 with mild vasospasm, s/p b/l ICA verapamil  CTA 5/30 with diffuse b/I anterior and posterior circulation vasospasm; now with Milrinone and Norepinephrine  - Stop Milrinone 0.25 mcg/kg/min     #IVH with subsequent hydrocephalus  #Brain compression with midline shift R>L  EVD placed 5/15, replaced 6/5 2/2 EVD malfunction, replaced again on 6/6 for occlusion   - EVD @ 20' open, ICP 2-9, drained 10 ml's in the last 24hrs     #Agitation  - Seroquel 12.5 mg BID PRN  - Delirium precautions     #Analgesics/sedation  - Tylenol 650 mg q4h PRN  - Oxycodone 5 mg q4h PRN  - Stop Propofol     CV:  #HTN  ECHO completed on  5/17, EF 60-65%  - SBP < 200  - PTA Lisinopril 10 mg daily  - Hydralazine and Labetalol PRN     See Neuro  - Stop Milrinone 0.25 mcg/kg/daily     Pulm:  #Aspiration pneumonia  #Thick secretions  Extubated 6/3; re-intubated 6/6  - Plan for Trach Monday  - Continue with pulmonary hygiene  - Duonebs q6h  - Guaifenesin q6h  - 3% saline nebs q6h  - Chest Physiotherapy q6h  - Continuous pulse ox monitoring  - Maintain O2 saturations > 92%     GI/Nutrition:  #Severe malnutrition in the context of acute on chronic illness  - Protein modular 1 Pkt  - Multivitamins with minerals     - Diet: TF @ goal (60)  - NG  - Plan for PEG--Consult SICU  - Last BM-- 6/4  Bowel regimen: Scheduled Senna-docusate and Miralax daily      Renal:  #Hypercholemia ~ resolved  - IV Fluids 50 ml q2h  #Hypokalemia ~ resolved  - Potassium chloride 20 mEq BID     #Urinary retention  - Bladder scan PRN  - Doxazosin 1 mg daily     - IV fluids: None  - BMP daily  - Electrolyte replacement protocol in place     Endo:  #Hyperglycemia  - Follow glucose levels     Heme:  #Leukocytosis: 16.3-->25.2  #Normocytic anemia: 9.2  #Thrombocytosis: 726-->730  - CBC daily     ID:  #Aspiration pneumonia  #Probable Ventriculitis  #5/22 sputum with moderate growth pseudomonas, klebsiella which is pansensitive  - 5/28, 5/25, 5/19 pancultured for only heavy growth pseudomonas, Klebsiella oxytoca, Staphylococcus epidermidis; heavy growth beta hemolytic Streptococcus constellatus  - Ceftriaxone (5/21-5/23) was switched to Zosyn (5/23-5/28)  -->switch to Cefepime and Vancomycin for presumed ventriculitis given elevated WBC/RBC ration from CSF cell lines  #Concern for ESBL on 6/1 sputum culture (growth discovered 6/4)  -->switched from Cefepime to Meropenum to cover for ESBL and ventriculitis (Day 10/14)  - 6/1 BC x 2 with NGTD  - 6/1 Sputum culture--prelim with moderate growth pseudomonas aeruginosa, heavy growth staphylococcus epidermidis, light growth lactose fermenting gram  negative rods  - 6/1 CSF RBC 31,000; ; Glucose 73; Protein 71  - CSF culture--prelim negative  - Follow fever curve     PPX:  DVT Prophylaxis  - SCDs in place  - Heparin subcutaneous 5000 mg q8h  GI Prophylaxis  - Not indicated     Lines/Tubes/Drains:  - PIV x 2  - L subclavian CVC/TL 5/15  - R arterial line 5/27  - NG   - EVD     Code Status: Full Code     Dispo: ICU    The patient was seen and discussed with the attending, Dr. Starr.    Audelia LAIRD, CNP  NeuroCritical Care Nurse Practitioner  Pager: [phone]  Ascom: [phone] available M-Rodriguez [phone] to [phone]

## 2021-06-06 NOTE — PROCEDURES
"Neurosurgery Ventriculostomy Replacement Procedure Note  Date of Procedure: 06/06/2021  Pre procedure Diagnosis: malfunction of EVD  Post procedure Diagnosis: malfunction of EVD  Consent: Obtained  Anesthesia: Local  Time Out Performed  Procedure: left sided External ventricular drain Replacement.  Nursing staff entered this procedure into the EVD Quality Improvement Database    Details of the Procedure:  - Bed was positioned for unencumbered access for sterile procedure  - Patient was supine with head in the neutral position. left side of the head was widely shaved using clippers.  - All staff wore face masks and hats.  - Antibiotics were given pre-procedure.  - Anatomical landmarks were used for defining the trajectory and entry point for the ventriculostomy. The incision was marked 11 cm behind the nasion and 3 cm away from the midline.  - A hibiclenz scrub was performed  - The site was then prepped and draped in the standard sterile fashion with full body draping.  - 5 mls of 1% lidocaine with 1:[id number] epinephrine was injected.  - Using a # 15 blade a 1-2 cm incision was made.  - A hand drill was used to make a kwame hole.  - Using a clean pair of sterile gloves, a bactiseal ventricular catheter with a stylet was passed aiming towards the ipsilateral medial canthus and midway between the ipsilateral tragus and lateral canthus.  - When the \"pop\" was felt as the catheter entered the ventricle and CSF was obtained, the stylet was withdrawn and the catheter was furthered another cm. CSF came out under pressure. A cap was applied and opening pressure was obtained.  - A tunneler was passed from the incision >5 cm behind and the catheter was attached to the blunt end and tunneled out a distance from the incision.   - The catheter was then attached to the [place] drainage system and connected to the monitor. Opening pressure was 9.  - Incision was closed using 3/0 Nylon or staples.  - The drain was secured at the exit " site using 3-0 nylon suture.  - Drain was secured using staples.  - Bioseal disk was placed at the exit site of the catheter  - The site was covered with sterile primapore dressing  - Patient tolerated the procedure well.  - The drain was leveled and set at 20.    -----------------------------------  Anjana Sutton MD, MS  Neurosurgery PGY-4

## 2021-06-06 NOTE — PLAN OF CARE
Shift durration: 2253-8501    Neuro/LOC:  lethargic, labile mental status but overall improved from previous day per wife. Neuro assessments remained unchanged throughout this shift. EVD moved to 20 above after rounds this AM. Drain output remained at 0 throughout day, Neurosurgery team updated in rounds and at end of shift. ICP remained 9-12 with dampened waveform throughout shift.  Cards: HTN but SBP remained <200 and MAP's >65. S1, S2 heart tones. Milrinone decreased to 0.25 mcg/kg/min today.  Pulm: LS coarse/dim, pt had deSAT episode this AM and respiratory support increased to HFNC 50LPM, 60%. Deep oral suctioning per RN q15-30 minutes throughout shift to remove thick, tenacious, tan/white/cloudy/creamy secretions.    GI/: tube feeds held d/t tenuous respiratory status this AM, resumed this afternoon, 25 q1H flushes. NPO. No stool output today, external urine catheter.  Skin: no changes to skin.  Mobility: A2 with lift. Up to chair x1  Vasc access/Drains/Device: PIV, subclavian.    Special/Event: DeSAT episode, drain remained dampened, output remained at 0 throughout day. Head CT at 1830 awaiting read. Plan possible drain exchange per Neurosurgery. Continue to monitor.

## 2021-06-06 NOTE — ANESTHESIA PROCEDURE NOTES
Airway       Patient location during procedure: ICU  Staff -        Resident/Fellow: Sunita Smalls MD       Performed By: resident  Consent for Airway        Urgency: emergent       Consent: The procedure was performed in an emergent situation.  Report Obtained from Primary Care Team       History regarding most recent potassium obtained: Yes       History regarding presence/absence of renal failure obtained:Yes       History regarding stroke/CVA obtained:Yes       History regarding presence/absence of NM disorder: YesIndications and Patient Condition       Indications for airway management: airway protection and respiratory insufficiency       Mallampati: Not Assessed     Induction type:RSI       Mask difficulty assessment: 1 - vent by mask    Final Airway Details       Final airway type: endotracheal airway       Successful airway: ETT - single  Endotracheal Airway Details        ETT size (mm): 8.0       Cuffed: yes       Successful intubation technique: video laryngoscopy       VL Blade Size: MAC 4       Grade View of Cords: 2       Adjucts: stylet       Position: Right       Measured from: lips       Secured at (cm): 24    Post intubation assessment        ETT secured, Vent settings by primary/ICU team, Primary/ICU team to review CXR, Sedation to be ordered by primary/ICU team and No apparent complications       Placement verified by: capnometry, equal breath sounds and chest rise        Number of attempts at approach: 1       Number of other approaches attempted: 0       Secured with: commercial tube smith       Ease of procedure: easy       Dentition: Unchanged    Medication(s) Administered   propofol (DIPRIVAN) injection 10 mg/mL vial, 90 mg  rocuronium (ZEMURON) 10 mg/mL injection, 100 mg  Medication Administration Time: 6/6/2021 6:45 AM

## 2021-06-07 ENCOUNTER — ANESTHESIA (OUTPATIENT)
Dept: SURGERY | Facility: CLINIC | Age: 71
DRG: 003 | End: 2021-06-07
Payer: COMMERCIAL

## 2021-06-07 ENCOUNTER — ANESTHESIA EVENT (OUTPATIENT)
Dept: SURGERY | Facility: CLINIC | Age: 71
DRG: 003 | End: 2021-06-07
Payer: COMMERCIAL

## 2021-06-07 LAB
ANION GAP SERPL CALCULATED.3IONS-SCNC: 3 MMOL/L (ref 3–14)
BACTERIA SPEC CULT: NO GROWTH
BACTERIA SPEC CULT: NO GROWTH
BUN SERPL-MCNC: 24 MG/DL (ref 7–30)
CALCIUM SERPL-MCNC: 9.5 MG/DL (ref 8.5–10.1)
CHLORIDE SERPL-SCNC: 108 MMOL/L (ref 94–109)
CO2 SERPL-SCNC: 31 MMOL/L (ref 20–32)
CREAT SERPL-MCNC: 0.48 MG/DL (ref 0.66–1.25)
ERYTHROCYTE [DISTWIDTH] IN BLOOD BY AUTOMATED COUNT: 14 % (ref 10–15)
GFR SERPL CREATININE-BSD FRML MDRD: >90 ML/MIN/{1.73_M2}
GLUCOSE BLDC GLUCOMTR-MCNC: 103 MG/DL (ref 70–99)
GLUCOSE BLDC GLUCOMTR-MCNC: 104 MG/DL (ref 70–99)
GLUCOSE BLDC GLUCOMTR-MCNC: 123 MG/DL (ref 70–99)
GLUCOSE BLDC GLUCOMTR-MCNC: 135 MG/DL (ref 70–99)
GLUCOSE BLDC GLUCOMTR-MCNC: 138 MG/DL (ref 70–99)
GLUCOSE BLDC GLUCOMTR-MCNC: 142 MG/DL (ref 70–99)
GLUCOSE BLDC GLUCOMTR-MCNC: 147 MG/DL (ref 70–99)
GLUCOSE SERPL-MCNC: 103 MG/DL (ref 70–99)
HCT VFR BLD AUTO: 26.1 % (ref 40–53)
HGB BLD-MCNC: 8.2 G/DL (ref 13.3–17.7)
LABORATORY COMMENT REPORT: NORMAL
MAGNESIUM SERPL-MCNC: 2.3 MG/DL (ref 1.6–2.3)
MCH RBC QN AUTO: 30.6 PG (ref 26.5–33)
MCHC RBC AUTO-ENTMCNC: 31.4 G/DL (ref 31.5–36.5)
MCV RBC AUTO: 97 FL (ref 78–100)
PHOSPHATE SERPL-MCNC: 2.9 MG/DL (ref 2.5–4.5)
PLATELET # BLD AUTO: 520 10E9/L (ref 150–450)
POTASSIUM SERPL-SCNC: 3.8 MMOL/L (ref 3.4–5.3)
RBC # BLD AUTO: 2.68 10E12/L (ref 4.4–5.9)
SARS-COV-2 RNA RESP QL NAA+PROBE: NEGATIVE
SODIUM SERPL-SCNC: 142 MMOL/L (ref 133–144)
SPECIMEN SOURCE: NORMAL
WBC # BLD AUTO: 16.2 10E9/L (ref 4–11)

## 2021-06-07 PROCEDURE — 250N000025 HC SEVOFLURANE, PER MIN: Performed by: NEUROLOGICAL SURGERY

## 2021-06-07 PROCEDURE — 94003 VENT MGMT INPAT SUBQ DAY: CPT

## 2021-06-07 PROCEDURE — 85027 COMPLETE CBC AUTOMATED: CPT | Performed by: STUDENT IN AN ORGANIZED HEALTH CARE EDUCATION/TRAINING PROGRAM

## 2021-06-07 PROCEDURE — 84100 ASSAY OF PHOSPHORUS: CPT | Performed by: STUDENT IN AN ORGANIZED HEALTH CARE EDUCATION/TRAINING PROGRAM

## 2021-06-07 PROCEDURE — 250N000011 HC RX IP 250 OP 636: Performed by: STUDENT IN AN ORGANIZED HEALTH CARE EDUCATION/TRAINING PROGRAM

## 2021-06-07 PROCEDURE — 250N000011 HC RX IP 250 OP 636: Performed by: NEUROLOGICAL SURGERY

## 2021-06-07 PROCEDURE — 250N000013 HC RX MED GY IP 250 OP 250 PS 637: Performed by: STUDENT IN AN ORGANIZED HEALTH CARE EDUCATION/TRAINING PROGRAM

## 2021-06-07 PROCEDURE — 200N000002 HC R&B ICU UMMC

## 2021-06-07 PROCEDURE — 250N000011 HC RX IP 250 OP 636

## 2021-06-07 PROCEDURE — 250N000009 HC RX 250: Performed by: NURSE PRACTITIONER

## 2021-06-07 PROCEDURE — 0B110F4 BYPASS TRACHEA TO CUTANEOUS WITH TRACHEOSTOMY DEVICE, OPEN APPROACH: ICD-10-PCS | Performed by: NEUROLOGICAL SURGERY

## 2021-06-07 PROCEDURE — 43246 EGD PLACE GASTROSTOMY TUBE: CPT | Performed by: SURGERY

## 2021-06-07 PROCEDURE — 250N000009 HC RX 250: Performed by: STUDENT IN AN ORGANIZED HEALTH CARE EDUCATION/TRAINING PROGRAM

## 2021-06-07 PROCEDURE — 80048 BASIC METABOLIC PNL TOTAL CA: CPT | Performed by: STUDENT IN AN ORGANIZED HEALTH CARE EDUCATION/TRAINING PROGRAM

## 2021-06-07 PROCEDURE — 370N000017 HC ANESTHESIA TECHNICAL FEE, PER MIN: Performed by: NEUROLOGICAL SURGERY

## 2021-06-07 PROCEDURE — 258N000003 HC RX IP 258 OP 636

## 2021-06-07 PROCEDURE — 250N000009 HC RX 250: Performed by: NURSE ANESTHETIST, CERTIFIED REGISTERED

## 2021-06-07 PROCEDURE — 250N000009 HC RX 250

## 2021-06-07 PROCEDURE — 999N001017 HC STATISTIC GLUCOSE BY METER IP

## 2021-06-07 PROCEDURE — 94640 AIRWAY INHALATION TREATMENT: CPT | Mod: 76

## 2021-06-07 PROCEDURE — 83735 ASSAY OF MAGNESIUM: CPT | Performed by: STUDENT IN AN ORGANIZED HEALTH CARE EDUCATION/TRAINING PROGRAM

## 2021-06-07 PROCEDURE — 250N000013 HC RX MED GY IP 250 OP 250 PS 637: Performed by: NURSE PRACTITIONER

## 2021-06-07 PROCEDURE — 250N000013 HC RX MED GY IP 250 OP 250 PS 637: Performed by: NEUROLOGICAL SURGERY

## 2021-06-07 PROCEDURE — 360N000075 HC SURGERY LEVEL 2, PER MIN: Performed by: NEUROLOGICAL SURGERY

## 2021-06-07 PROCEDURE — 258N000003 HC RX IP 258 OP 636: Performed by: NEUROLOGICAL SURGERY

## 2021-06-07 PROCEDURE — 999N000157 HC STATISTIC RCP TIME EA 10 MIN

## 2021-06-07 PROCEDURE — 250N000009 HC RX 250: Performed by: PSYCHIATRY & NEUROLOGY

## 2021-06-07 PROCEDURE — 272N000001 HC OR GENERAL SUPPLY STERILE: Performed by: NEUROLOGICAL SURGERY

## 2021-06-07 PROCEDURE — 94668 MNPJ CHEST WALL SBSQ: CPT

## 2021-06-07 PROCEDURE — 258N000003 HC RX IP 258 OP 636: Performed by: NURSE PRACTITIONER

## 2021-06-07 PROCEDURE — 0DH63UZ INSERTION OF FEEDING DEVICE INTO STOMACH, PERCUTANEOUS APPROACH: ICD-10-PCS | Performed by: SURGERY

## 2021-06-07 PROCEDURE — 43246 EGD PLACE GASTROSTOMY TUBE: CPT | Mod: GC | Performed by: SURGERY

## 2021-06-07 PROCEDURE — 250N000009 HC RX 250: Performed by: NEUROLOGICAL SURGERY

## 2021-06-07 PROCEDURE — 99291 CRITICAL CARE FIRST HOUR: CPT | Mod: GC | Performed by: PSYCHIATRY & NEUROLOGY

## 2021-06-07 RX ORDER — FENTANYL CITRATE 50 UG/ML
INJECTION, SOLUTION INTRAMUSCULAR; INTRAVENOUS
Status: COMPLETED
Start: 2021-06-07 | End: 2021-06-07

## 2021-06-07 RX ORDER — EPHEDRINE SULFATE 50 MG/ML
INJECTION, SOLUTION INTRAMUSCULAR; INTRAVENOUS; SUBCUTANEOUS PRN
Status: DISCONTINUED | OUTPATIENT
Start: 2021-06-07 | End: 2021-06-07

## 2021-06-07 RX ORDER — LIDOCAINE HYDROCHLORIDE AND EPINEPHRINE 10; 10 MG/ML; UG/ML
INJECTION, SOLUTION INFILTRATION; PERINEURAL PRN
Status: DISCONTINUED | OUTPATIENT
Start: 2021-06-07 | End: 2021-06-07 | Stop reason: HOSPADM

## 2021-06-07 RX ORDER — FAMOTIDINE 20 MG/1
20 TABLET, FILM COATED ORAL 2 TIMES DAILY
Status: DISCONTINUED | OUTPATIENT
Start: 2021-06-07 | End: 2021-06-11 | Stop reason: HOSPADM

## 2021-06-07 RX ORDER — DEXAMETHASONE SODIUM PHOSPHATE 4 MG/ML
INJECTION, SOLUTION INTRA-ARTICULAR; INTRALESIONAL; INTRAMUSCULAR; INTRAVENOUS; SOFT TISSUE PRN
Status: DISCONTINUED | OUTPATIENT
Start: 2021-06-07 | End: 2021-06-07

## 2021-06-07 RX ORDER — SODIUM CHLORIDE, SODIUM LACTATE, POTASSIUM CHLORIDE, CALCIUM CHLORIDE 600; 310; 30; 20 MG/100ML; MG/100ML; MG/100ML; MG/100ML
INJECTION, SOLUTION INTRAVENOUS CONTINUOUS PRN
Status: DISCONTINUED | OUTPATIENT
Start: 2021-06-07 | End: 2021-06-07

## 2021-06-07 RX ORDER — FENTANYL CITRATE 50 UG/ML
50-100 INJECTION, SOLUTION INTRAMUSCULAR; INTRAVENOUS
Status: COMPLETED | OUTPATIENT
Start: 2021-06-07 | End: 2021-06-07

## 2021-06-07 RX ORDER — POTASSIUM CHLORIDE 1.5 G/1.58G
20 POWDER, FOR SOLUTION ORAL ONCE
Status: COMPLETED | OUTPATIENT
Start: 2021-06-07 | End: 2021-06-07

## 2021-06-07 RX ADMIN — DEXAMETHASONE SODIUM PHOSPHATE 8 MG: 4 INJECTION, SOLUTION INTRA-ARTICULAR; INTRALESIONAL; INTRAMUSCULAR; INTRAVENOUS; SOFT TISSUE at 08:43

## 2021-06-07 RX ADMIN — SUGAMMADEX 200 MG: 100 INJECTION, SOLUTION INTRAVENOUS at 09:32

## 2021-06-07 RX ADMIN — VANCOMYCIN HYDROCHLORIDE 1750 MG: 10 INJECTION, POWDER, LYOPHILIZED, FOR SOLUTION INTRAVENOUS at 17:16

## 2021-06-07 RX ADMIN — DEXMEDETOMIDINE 0.5 MCG/KG/HR: 100 INJECTION, SOLUTION, CONCENTRATE INTRAVENOUS at 02:17

## 2021-06-07 RX ADMIN — IPRATROPIUM BROMIDE AND ALBUTEROL SULFATE 3 ML: 2.5; .5 SOLUTION RESPIRATORY (INHALATION) at 14:14

## 2021-06-07 RX ADMIN — ROCURONIUM BROMIDE 20 MG: 10 INJECTION INTRAVENOUS at 09:07

## 2021-06-07 RX ADMIN — VANCOMYCIN HYDROCHLORIDE 1750 MG: 10 INJECTION, POWDER, LYOPHILIZED, FOR SOLUTION INTRAVENOUS at 05:55

## 2021-06-07 RX ADMIN — PHENYLEPHRINE HYDROCHLORIDE 100 MCG: 10 INJECTION INTRAVENOUS at 08:50

## 2021-06-07 RX ADMIN — ROCURONIUM BROMIDE 50 MG: 10 INJECTION INTRAVENOUS at 08:30

## 2021-06-07 RX ADMIN — MEROPENEM 2 G: 1 INJECTION, POWDER, FOR SOLUTION INTRAVENOUS at 10:32

## 2021-06-07 RX ADMIN — MIDAZOLAM 2 MG: 1 INJECTION INTRAMUSCULAR; INTRAVENOUS at 13:38

## 2021-06-07 RX ADMIN — DOCUSATE SODIUM 50 MG AND SENNOSIDES 8.6 MG 1 TABLET: 8.6; 5 TABLET, FILM COATED ORAL at 07:35

## 2021-06-07 RX ADMIN — NYSTATIN 500000 UNITS: 500000 SUSPENSION ORAL at 12:08

## 2021-06-07 RX ADMIN — IPRATROPIUM BROMIDE AND ALBUTEROL SULFATE 3 ML: 2.5; .5 SOLUTION RESPIRATORY (INHALATION) at 21:58

## 2021-06-07 RX ADMIN — SODIUM CHLORIDE SOLN NEBU 3% 3 ML: 3 NEBU SOLN at 01:34

## 2021-06-07 RX ADMIN — NYSTATIN 500000 UNITS: 500000 SUSPENSION ORAL at 19:36

## 2021-06-07 RX ADMIN — MEROPENEM 2 G: 1 INJECTION, POWDER, FOR SOLUTION INTRAVENOUS at 02:17

## 2021-06-07 RX ADMIN — NYSTATIN 500000 UNITS: 500000 SUSPENSION ORAL at 15:59

## 2021-06-07 RX ADMIN — IPRATROPIUM BROMIDE AND ALBUTEROL SULFATE 3 ML: 2.5; .5 SOLUTION RESPIRATORY (INHALATION) at 01:35

## 2021-06-07 RX ADMIN — Medication 1 PACKET: at 07:35

## 2021-06-07 RX ADMIN — FENTANYL CITRATE 50 MCG: 50 INJECTION, SOLUTION INTRAMUSCULAR; INTRAVENOUS at 14:02

## 2021-06-07 RX ADMIN — GUAIFENESIN 10 ML: 100 SOLUTION ORAL at 04:07

## 2021-06-07 RX ADMIN — INSULIN ASPART 1 UNITS: 100 INJECTION, SOLUTION INTRAVENOUS; SUBCUTANEOUS at 12:09

## 2021-06-07 RX ADMIN — ROCURONIUM BROMIDE 50 MG: 10 INJECTION INTRAVENOUS at 08:15

## 2021-06-07 RX ADMIN — DEXMEDETOMIDINE 0.5 MCG/KG/HR: 100 INJECTION, SOLUTION, CONCENTRATE INTRAVENOUS at 15:59

## 2021-06-07 RX ADMIN — MEROPENEM 2 G: 1 INJECTION, POWDER, FOR SOLUTION INTRAVENOUS at 18:19

## 2021-06-07 RX ADMIN — FENTANYL CITRATE 50 MCG: 50 INJECTION, SOLUTION INTRAMUSCULAR; INTRAVENOUS at 13:40

## 2021-06-07 RX ADMIN — GUAIFENESIN 10 ML: 100 SOLUTION ORAL at 22:25

## 2021-06-07 RX ADMIN — Medication 5 MG: at 08:38

## 2021-06-07 RX ADMIN — Medication 1 PACKET: at 19:36

## 2021-06-07 RX ADMIN — POTASSIUM CHLORIDE 20 MEQ: 1.5 POWDER, FOR SOLUTION ORAL at 19:36

## 2021-06-07 RX ADMIN — SODIUM CHLORIDE SOLN NEBU 3% 3 ML: 3 NEBU SOLN at 14:14

## 2021-06-07 RX ADMIN — POTASSIUM CHLORIDE 20 MEQ: 1.5 POWDER, FOR SOLUTION ORAL at 05:55

## 2021-06-07 RX ADMIN — Medication 10 MG: at 08:32

## 2021-06-07 RX ADMIN — INSULIN ASPART 1 UNITS: 100 INJECTION, SOLUTION INTRAVENOUS; SUBCUTANEOUS at 23:45

## 2021-06-07 RX ADMIN — POTASSIUM CHLORIDE 20 MEQ: 1.5 POWDER, FOR SOLUTION ORAL at 07:35

## 2021-06-07 RX ADMIN — SODIUM CHLORIDE, POTASSIUM CHLORIDE, SODIUM LACTATE AND CALCIUM CHLORIDE: 600; 310; 30; 20 INJECTION, SOLUTION INTRAVENOUS at 08:15

## 2021-06-07 RX ADMIN — MIDAZOLAM 2 MG: 1 INJECTION INTRAMUSCULAR; INTRAVENOUS at 08:15

## 2021-06-07 RX ADMIN — FAMOTIDINE 20 MG: 20 TABLET ORAL at 19:36

## 2021-06-07 RX ADMIN — NYSTATIN 500000 UNITS: 500000 SUSPENSION ORAL at 07:35

## 2021-06-07 RX ADMIN — PHENYLEPHRINE HYDROCHLORIDE 100 MCG: 10 INJECTION INTRAVENOUS at 08:56

## 2021-06-07 RX ADMIN — ALTEPLASE 2 MG: 2.2 INJECTION, POWDER, LYOPHILIZED, FOR SOLUTION INTRAVENOUS at 00:30

## 2021-06-07 RX ADMIN — PHENYLEPHRINE HYDROCHLORIDE 100 MCG: 10 INJECTION INTRAVENOUS at 08:38

## 2021-06-07 ASSESSMENT — MIFFLIN-ST. JEOR: SCORE: 1503.13

## 2021-06-07 ASSESSMENT — ACTIVITIES OF DAILY LIVING (ADL)
ADLS_ACUITY_SCORE: 24

## 2021-06-07 NOTE — PROGRESS NOTES
[facility], [place]   06/07/2021  Neurosurgery Progress Note:    Assessment:  Dayron Neri is a 70 year old male presented with AMS found to have aSAH with right supraclinoid aneurysm. mFS4 and HH5. GCS 5. S/p EVD placement, DCA,s/p aneurysm clipping on 5/16. EVD replaced on 6/4, and again on 6/6 due to EVD clogging. Had to be reintubated on 6/6 due to respiratory failure.     The following conditions are also present, complicating the patient's current management, as described in the Assessment and Plan:   mechanical ventilation on day of admission, intracerebral hematoma, brain compression, acute respiratory failure, coma, based on current GCS of 5 and cerebral aneurysm rupture with hunt sims scale 5 modified lowery 4     Hypokalemia   Severe malnutrition in the context of acute on chronic illness  Respiratory failure  Pneumonia    Plan:  - EVD: 20 open  - Q1hr neuro exam  - SBP<200  - HOB > 30 degrees  - follow-up clx  -- Meropenem/Vancomycin for PNA  - Plan for tracheostomy   - Continuous cardiac monitoring while in ICU   - Glucose < 180  - Continue glucose checks  - Platelets > 100,000  - INR < 1.5  - Hemoglobin > 8  - DVT: SCDs while in bed  - Disposition: 4A  - PT/OT consulted    -----------------------------------  Myranda Woodson MD  Neurosurgery Resident, PGY-2    Please contact neurosurgery resident on call with questions.    Dial * * [phone], enter [phone] when prompted.   -----------------------------------    Interval History: EVD replaced, reintubated.    Objective:   Temp:  [97.4  F (36.3  C)-99.1  F (37.3  C)] 97.4  F (36.3  C)  Pulse:  [] 58  Resp:  [14-28] 16  BP: ()/(62-94) 113/74  MAP:  [63 mmHg-211 mmHg] 103 mmHg  Arterial Line BP: ()/() 154/78  FiO2 (%):  [40 %-50 %] 40 %  SpO2:  [93 %-100 %] 98 %  I/O last 3 completed shifts:  In: 2949.27 [I.V.:1119.27; NG/GT:810]  Out: 2625 [Urine:2500; Other:50; Stool:75]    Gen: Intubated  Incision:  c/d/i, drain and EVD in place  Intubated, sedated  Open eyes to voice  Localizes b/l UE L>R  W/d RUE  W/d b/l LE    LABS:  Recent Labs   Lab 06/07/21  0438 06/06/21  0354 06/05/21  0425    139 136   POTASSIUM 3.8 3.8 3.8   CHLORIDE 108 105 106   CO2 31 30 26   ANIONGAP 3 5 4   * 148* 149*   BUN 24 24 25   CR 0.48* 0.47* 0.42*   RAGINI 9.5 9.6 9.6       Recent Labs   Lab 06/07/21  0438   WBC 16.2*   RBC 2.68*   HGB 8.2*   HCT 26.1*   MCV 97   MCH 30.6   MCHC 31.4*   RDW 14.0   *       IMAGING:  Recent Results (from the past 24 hour(s))   CT Head w/o Contrast    Narrative    CT HEAD W/O CONTRAST 6/6/2021 10:51 AM    History: Subarachnoid hemorrhage (SAH), follow up; check EVD placement      Comparison: Head CT dated 6/6/2021 at 5:04    Technique: Using multidetector thin collimation helical acquisition  technique, axial, coronal and sagittal CT images from the skull base  to the vertex were obtained without intravenous contrast.   (topogram) image(s) also obtained and reviewed.    Findings: Repositioning of left frontal approach ventriculostomy  catheter with the tip adjacent to the left foramen of Monro. The  ventricular size is slightly decreased from prior with similar  ventricular configuration. Stable small layering intraventricular  hemorrhage within the occipital horn of the bilateral lateral  ventricles. Postoperative changes of right frontotemporal craniotomy  with similar subdural fluid collection overlying right frontal  convexity. Stable right posterior communicating artery clipping.  Evolving multifocal ischemic stroke involving right frontal temporal  region, and left posterior frontal lobe. Evolving scattered diffuse  subarachnoid hemorrhage. No new intra-axial or extra-axial  abnormalities. Stable 7 mm right to left midline shift. Basal cisterns  are patent.    No signal change in paranasal sinus opacification right greater than  left. Mastoid air cells are clear.      Impression     Impression:  1.  Repositioning of left frontal approach ventriculostomy catheter  with the tip is adjacent to the left foramen of Monro. The ventricular  size is similar to slightly decreased.  2.  The left frontal subdural fluid collection, multifocal evolving  ischemic stroke, intraventricular hemorrhage as well as subarachnoid  hemorrhage are not substantially changed.    I have personally reviewed the examination and initial interpretation  and I agree with the findings.    TOD RICE MD

## 2021-06-07 NOTE — PLAN OF CARE
Shift durration: 1791-0873     Neuro/LOC:  lethargic/sedated. Neuro assessments remained unchanged throughout this shift. EVD exchanged this AM, at 20 above EAM. Drain output 2-5mL/hr. ICP remained 4-12 with appropriate waveform throughout shift.  Cards: SBP remained <200 and MAP's >65. S1, S2 heart tones. Milrinone off, levo on/off today.  Pulm: LS coarse/dim, frequent thick, tenacious, tan/white/cloudy/creamy secretions.  cmv 85504/8/40%.  GI/: tube feeds at goal, 25 q1H flushes. NPO. No stool output today, external urine catheter.  Skin: no changes to skin.  Mobility: A2 with lift.  Vasc access/Drains/Device: PIV, subclavian.     Special/Event: EVD exchange, CT for verification of EVD, pulsate mattress applied to bed. Continue to monitor.

## 2021-06-07 NOTE — ANESTHESIA PREPROCEDURE EVALUATION
Anesthesia Pre-Procedure Evaluation    Patient: Dayron Neri   MRN: [id number] : 1950        Preoperative Diagnosis: Ventilator dependence (H) [Z99.11]   Procedure : Procedure(s):  CREATION, TRACHEOSTOMY     Past Medical History:   Diagnosis Date     Displacement of lumbar intervertebral disc without myelopathy     Lumbar disc rupture, no surgery      Past Surgical History:   Procedure Laterality Date     CRANIOTOMY, EVACUATE HEMATOMA SUBDURAL, COMBINED Right 05/15/2021    Procedure: CRANIOTOMY, FOR SUBDURAL HEMATOMA EVACUATION;  Surgeon: Jamin Martinez MD;  Location: [facility] OR     CRANIOTOMY, REPAIR ANEURYSM, COMBINED Right 05/15/2021    Procedure: CRANIOTOMY, FOR ANEURYSM REPAIR;  Surgeon: Jamin Martinez MD;  Location: [facility] OR     IR CAROTID CEREBRAL ANGIOGRAM BILATERAL  2021      No Known Allergies   Social History     Tobacco Use     Smoking status: Never Smoker     Smokeless tobacco: Never Used   Substance Use Topics     Alcohol use: Yes     Comment: moderate use      Wt Readings from Last 1 Encounters:   21 71.1 kg (156 lb 12 oz)        Anesthesia Evaluation            ROS/MED HX  ENT/Pulmonary:       Neurologic:     (+) delerium, resolved No. CVA (Subarachnoid hemorrhage), with deficits,     Cardiovascular:     (+) hypertension----- (-) murmur   METS/Exercise Tolerance:     Hematologic:       Musculoskeletal:       GI/Hepatic:       Renal/Genitourinary:       Endo:       Psychiatric/Substance Use:       Infectious Disease:       Malignancy:       Other:            Physical Exam    Airway   unable to assess          Respiratory Devices and Support    ETT:      Dental    unable to assess        Cardiovascular   cardiovascular exam normal       Rhythm and rate: regular and normal (-) no murmur    Pulmonary           breath sounds clear to auscultation           OUTSIDE LABS:  CBC:   Lab Results   Component Value Date    WBC 16.2 (H) 2021    WBC 25.2 (H) 2021     HGB 8.2 (L) 06/07/2021    HGB 9.2 (L) 06/06/2021    HCT 26.1 (L) 06/07/2021    HCT 29.2 (L) 06/06/2021     (H) 06/07/2021     (H) 06/06/2021     BMP:   Lab Results   Component Value Date     06/07/2021     06/06/2021    POTASSIUM 3.8 06/07/2021    POTASSIUM 3.8 06/06/2021    CHLORIDE 108 06/07/2021    CHLORIDE 105 06/06/2021    CO2 31 06/07/2021    CO2 30 06/06/2021    BUN 24 06/07/2021    BUN 24 06/06/2021    CR 0.48 (L) 06/07/2021    CR 0.47 (L) 06/06/2021     (H) 06/07/2021     (H) 06/06/2021     COAGS:   Lab Results   Component Value Date    PTT 30 05/15/2021    INR 1.20 (H) 05/15/2021    FIBR 417 05/15/2021     POC:   Lab Results   Component Value Date     (H) 06/07/2021     HEPATIC:   Lab Results   Component Value Date    ALBUMIN 1.7 (L) 06/02/2021    PROTTOTAL 7.0 06/02/2021    ALT 64 06/02/2021    AST 34 06/02/2021    ALKPHOS 131 06/02/2021    BILITOTAL 0.2 06/02/2021     OTHER:   Lab Results   Component Value Date    PH 7.39 06/06/2021    LACT 1.2 05/26/2021    A1C 5.5 05/15/2021    RAGINI 9.5 06/07/2021    PHOS 2.9 06/07/2021    MAG 2.3 06/07/2021    TSH 5.36 (H) 05/15/2021    T4 1.23 05/15/2021    CRP 79.0 (H) 06/01/2021     (H) 05/25/2021       Anesthesia Plan    ASA Status:  4   NPO Status:  NPO Appropriate    Anesthesia Type: General.     - Airway: ETT   Induction: Intravenous.   Maintenance: Inhalation.        Consents    Anesthesia Plan(s) and associated risks, benefits, and realistic alternatives discussed. Questions answered and patient/representative(s) expressed understanding.     - Discussed with:  Patient      - Extended Intubation/Ventilatory Support Discussed: Yes.      - Patient is DNR/DNI Status: No    Use of blood products discussed: Yes.     - Discussed with: Patient.     Postoperative Care    Pain management: IV analgesics.   PONV prophylaxis: Ondansetron (or other 5HT-3), Dexamethasone or Solumedrol     Comments:                 Christopher J. Behrens, MD

## 2021-06-07 NOTE — PLAN OF CARE
Major Shift Events:  Pt trach and PEG'd today. TF restarted at 1800. EVD @ 20 above EAM, ICPs 2-8, output 0-3. Pts neuro status continues to wax and wane throughout the day.  Plan: Continue to monitor for acute changes. CT @ 0400 6/8.   For vital signs and complete assessments, please see documentation flowsheets.

## 2021-06-07 NOTE — BRIEF OP NOTE
[facility]    Brief Operative Note    Pre-operative diagnosis: Ventilator dependence (H) [Z99.11]  Post-operative diagnosis Same as pre-operative diagnosis    Procedure: Procedure(s):  CREATION, TRACHEOSTOMY  Surgeon: Surgeon(s) and Role:     * Alejandrina Maurer MD - Primary     * Dayron Yang MD - Resident - Assisting     * Samuel Nolasco MD - Resident - Assisting  Anesthesia: General   Estimated blood loss: 2 ml  Implants: 6 Shiley.   Drains: None  Specimens: * No specimens in log *  Findings:   High riding innominate. Copious mucus seen after trachea entered - suctioned under direct visualization prior to placement of trachestomy.   Complications: None.  Implants: * No implants in log *

## 2021-06-07 NOTE — PROCEDURES
PEG Tube Placement Procedure Note    Resident: IAN Mcqueen; Violet Mcclellan MD    Fellow: Pillo Banda MD    Staff: Arden Pagan MD     Indication: Continued tube feedings    Complications: none    Medications used: 2mg versed, 100mcgfentanyl    After informed consent was obtained from the patient wife, the patient was sedated, placed in supine position, and prepped in the usual sterile fashion of the abdomen. EGD was performed and the stomach was insufflated. The PEG site was chosen after light from the EGD was visualized through the skin and was palpated with simultaneous visualization on the scope. There area was anesthetized with 1% lidocaine. A 0.5cm skin incision was made, and needle was introduced under endoscopic visualization into the stomach. Guidewire was introduced through the needle using Seldinger technique and was grasped by the endoscope. The guidewire was brought out through the stomach and PEG tube was looped onto the guidewire. The PEG was brought into the stomach and out through the skin under endoscopic visualization. The PEG tubing was noted to be at the 3cm elinor at the skin. The inner flange was noted to be flush with the stomach wall by endoscope. The outer flange was then secured to the PEG. Three sutures were used to secure the PEG bumper to the skin.The patient tolerated the procedure well.    Dr. Pagan was present during the entirety of the procedure.    Please hold tube feeds and other medications through the PEG tube until 4 hours after procedure.   Keep PEG tube to gravity for 4 hours post-procedure.  OK to restart anticoagulation(if any) four hours after procedure.     IAN Mcqueen  PGY-1 Surgery  pager [phone]

## 2021-06-07 NOTE — OP NOTE
PATIENT NAME:                   Dayron Neri  PATIENT MRN:                     [id number]  PATIENT ACCOUNT:           [id number]  PATIENT YOB: 1950         NEUROSURGERY OPERATIVE REPORT     DATE OF SURGERY: 6/7/21     PREOPERATIVE DIAGNOSIS:  1. Respiratory failure  2. Aneurysmal subarachnoid hemorrhage     POSTOPERATIVE DIAGNOSIS:  1. Respiratory failure  2. Aneurysmal subarachnoid hemorrhage     PROCEDURES PERFORMED:  1. Open tracheostomy     STAFF SURGEON: Alejandrina Maurer MD     RESIDENT SURGEONS: Dayron Yang MD, PhD, Samuel Nolasco MD, PhD     ANESTHESIA: General endotracheal anesthesia     ESTIMATED BLOOD LOSS: 5 mL     IMPLANTS: Tracheostomy tube 6 Shiley     EXPLANTS: None     FINDINGS:  6 Shiley tracheostomy placed with good air movement and appropriate oxygen saturation.  Copious mucus seen after trachea entered - suctioned under direct visualization prior to placement of trachestomy tube.      COMPLICATIONS: None     INDICATIONS:   70-year-old gentleman who initially presented with a ruptured right PCoA aneurysm that was successfully clipped.  His postoperative course was notable for cerebral vasospasm and diffuse strokes demonstrated on MRI.  He had repeated extubation trials, but most recently required reintubation due to respiratory failure.  We determined that tracheostomy would be an appropriate next step.  His wife was consented and agreed to the procedure.     DESCRIPTION OF PROCEDURE:  The patient was brought to the operating room. His identity was verified. The patient was transferred in the supine position to the operating table. He was already intubated and general anesthesia was induced. Both arms were tucked. The patient was already on Meropenem and Vancomycin.     A horizontal linear incision was planned in the midline MCFP between the cricoid and the sternal notch. The patient was cleaned, prepared, and draped in sterile fashion. 6 mL of local anesthetic  was injected in the planned incision. Timeout was performed.     Using a #10 blade scalpel, the skin incision was carried down to the subcutaneous tissue. Monopolar cautery was then used to dissect down through the soft tissue. Once the anterior strap muscles of the neck were encountered, blunt longitudinal dissection was performed in the midline, along with bipolar cautery for hemostasis. We divided the thyroid isthmus with monopolar cautery. The pretracheal fascia was dissected. We then reached the trachea and exposed three tracheal rings. 2-0 silk tracheal retention sutures were placed.  Anesthesiology was then asked to increase FiO2 to 100%.      A #15 blade and mets were then used to create a 2 cm vertical incision into the trachea through the third ring. Using a tracheal , the opening was widened to accomodate the josefina tracheostomy tube. The ET tube cuff was visualized. Anesthesiology was then asked to withdraw the tube slightly. A 6 Shiley tracheostomy tube was then placed without resistance. The tracheostomy cuff was then inflated. The ventilator circuit was connected. Chest rise was visualized and end tidal CO2 confirmed proper placement of the tracheostomy. The endotracheal tube was then removed.      We then turned our attention to securing the tracheostomy. Four 3-0 nylon sutures were used to secure the corners of the tracheostomy bumper to the skin. Steri-strips were used to secure the silk retention ties to her chest.  She was transported back to the ICU without incident.     Dr. Viraomntes was present the entire procedure.      Samuel Nolasco MD, PhD  Neurosurgery Resident PGY-2        ATTESTATION: My signature attests that I was present for the entire operation described above. I agree with the above findings.    TRISTA VIRAMONTES MD

## 2021-06-07 NOTE — ANESTHESIA POSTPROCEDURE EVALUATION
Patient: Dayron Neri    Procedure(s):  CREATION, TRACHEOSTOMY    Diagnosis:Ventilator dependence (H) [Z99.11]  Diagnosis Additional Information: No value filed.    Anesthesia Type:  General    Note:  Disposition: ICU            ICU Sign Out: Anesthesiologist/ICU physician sign out WAS performed   Postop Pain Control: Uneventful            Sign Out: Well controlled pain   PONV: No   Neuro/Psych: Uneventful            Sign Out: Acceptable/Baseline neuro status   Airway/Respiratory: Uneventful            Sign Out: AIRWAY IN SITU/Resp. Support               Airway in situ/Resp. Support: Tracheostomy                 Reason: Planned Pre-op   CV/Hemodynamics: Uneventful            Sign Out: Acceptable CV status   Other NRE: NONE   DID A NON-ROUTINE EVENT OCCUR? No           Last vitals:  Vitals:    06/07/21 0800 06/07/21 1000 06/07/21 1100   BP: 113/74 123/79 (!) 140/97   Pulse: 58 59 57   Resp: 16 14 15   Temp: 36.3  C (97.4  F)     SpO2: 98%  95%       Last vitals prior to Anesthesia Care Transfer:  CRNA VITALS  6/7/2021 0856 - 6/7/2021 0956      6/7/2021             NIBP:  113/65    NIBP Mean:  78    Ht Rate:  70    SpO2:  99 %    EKG:  Sinus rhythm          Electronically Signed By: Christopher J. Behrens, MD  June 7, 2021  11:45 AM

## 2021-06-07 NOTE — PLAN OF CARE
Major shift events: neuro status unchanged. Following commands intermittently. R pupil 6 L pupil 5 bot round and briskly reactive. Moves LUE spontaneously. Wiggles toes on left foot, withdrawals from pain on R upper and lower extremity. Sedated. EVD 20 above EAM. ICP 7-12, Output 0-4, strong bounce present, waveform WDL. CMV settings 450/14/18/40%, moderate thick creamy white secretions. Lungs coarse and dim. MAP >65 without intervention. TF stopped at 0000 for planned trach placement today. Rectal pouch in place with 50 mL output. Primafit in place with adequate urine output. No changes to skin, sacral mepilex placed for prevention. Dex at 0.5.    Plan: Trach placement this morning. Monitor Neuro Q2H in day and Q4h at [facility].

## 2021-06-07 NOTE — CONSULTS
Care Management Discharge planning    Length of Stay (days): 23    Expected Discharge Date: TBD     Concerns to be Addressed:  Discharge planning     Patient plan of care discussed at interdisciplinary rounds: Yes    Anticipated Discharge Disposition:  LTAC     Anticipated Discharge Services:  Vent weaning    Education Provided on the Discharge Plan:  Yes  Patient/Family in Agreement with the Plan:  Yes    Referrals Placed by CM/SW:  Yes, Referral sent to [place] [facility], [facility] and [facility].    Additional Information:  Per morning report and chart review, pt will have trach placement done today.  Pt will need LTAC placement for vent weaning once medically stable.  Pt is in ICU vented, sedate and with EVD.  RNCC visited pt spouse, Susy for support and discuss about LTAC.  Susy stated the team have been updating her well about the plan of care.  RNCC discussed about LTAC and the referral process.  Susy agreed referral to be send for both [facility] and [facility].  Susy stated her preference is [facility] if both facility accept pt.  Referral have been sent to [facility] and [facility].  RNCC will cont to follow plan of care.         Madhu Sims RN, PHN, BSN  4A and 4E/ ICU  Care Coordinator  Phone: [phone]  Pager: [phone]

## 2021-06-07 NOTE — ANESTHESIA CARE TRANSFER NOTE
Patient: Dayron Neri    Procedure(s):  CREATION, TRACHEOSTOMY    Diagnosis: Ventilator dependence (H) [Z99.11]  Diagnosis Additional Information: No value filed.    Anesthesia Type:   General     Note:    Oropharynx: oropharynx clear of all foreign objects and ventilatory support  Level of Consciousness: iatrogenic sedation      Independent Airway: airway patency not satisfactory and stable  Dentition: dentition unchanged  Vital Signs Stable: post-procedure vital signs reviewed and stable  Report to RN Given: handoff report given  Patient transferred to: ICU  Comments: Pt transferred to ICU with new 6.0 Shiley trach in place. VSS throughout transfer. Paralytic reversed upon arrival to ICU. Report shared with RN at bedside.  ICU Handoff: Call for PAUSE to initiate/utilize ICU HANDOFF, Identified Patient, Identified Responsible Provider, Reviewed the Pertinent Medical History, Discussed Surgical Course, Reviewed Intra-OP Anesthesia Management and Issues during Anesthesia, Set Expectations for Post Procedure Period and Allowed Opportunity for Questions and Acknowledgement of Understanding      Vitals: (Last set prior to Anesthesia Care Transfer)  CRNA VITALS  6/7/2021 0856 - 6/7/2021 0943      6/7/2021             NIBP:  113/65    NIBP Mean:  78    Ht Rate:  70    SpO2:  99 %    EKG:  Sinus rhythm        Electronically Signed By: SISI Field CRNA  June 7, 2021  9:43 AM

## 2021-06-07 NOTE — PROGRESS NOTES
[facility]  NeuroCritical Care Progress Note    Patient Name:  Dayron Neri  MRN:  [id number]    :  1950  Date of Service:  2021  Primary care provider:  No primary care provider on file.        24 HOUR EVENTS:  Plan for Trach today. PEG TBD.    ASSESSMENT/PLAN:  Dayron Neri is a 70 year old admitted on 5/15/2021 with subarachnoid hemorrhage (MF4, HH5) and brain compression with midline shift R>L 2/2 ruptured P-comm aneurysm. Now S/p right P-comm aneurysm clipping on 5/15. Hospital course c/b acute respiratory failure with concern for aspiration PNA, S/p re-intubation on , right frontal, left frontoparietal, and right superior parietal infarcts on CT head , and vasospasms S/p b/l ICA verapamil on .      NEURO:  Neuro:  #Subarachnoid hemorrhage (MF4, HH5), aneurysmal: PBD#23  #Ruptured R PCOM aneurysm  Cerebral angiogram on 5/15 with confirmation of rupture  POD#23 for R craniotomy and clipping of PCOM aneurysm  - Neuro checks q2h (q4h at night)  - Nimodipine course completed   - Daily TCD checks completed  - HOB > 30 degrees     #Right frontal, left frontoparietal, and right superior parietal infarcts on CT head   #Bilateral Delayed Cerebral Ischemia, likely from vasospasm  Angio  with mild vasospasm, s/p b/l ICA verapamil  CTA  with diffuse b/I anterior and posterior circulation vasospasm     #IVH with subsequent hydrocephalus  #Brain compression with midline shift R>L  EVD placed 5/15, replaced  2/2 EVD malfunction, replaced again on  for occlusion   - EVD @ 20' open, ICP 7-12, drained 39mls     #Agitation  - Seroquel 12.5 mg BID PRN  - Delirium precautions        CARDIO:  ##HTN  ECHO completed on , EF 60-65%  - PTA Lisinopril 10 mg daily  - Hydralazine and Labetalol PRN  -SBP < 200      PULM:  #Aspiration pneumonia  #Thick secretions  Extubated 6/3; re-intubated   - Trach Monday  - Continue with pulmonary hygiene  -  Duonebs q6h  - Guaifenesin q6h  - 3% saline nebs q6h  - Chest Physiotherapy q6h  - Continuous pulse ox monitoring  - Maintain O2 saturations > 92%      GI:  #Severe malnutrition in the context of acute on chronic illness  - Protein modular 1 Pkt  - Multivitamins with minerals  - Diet: TF @ goal (60)  - NG  - Plan for PEG with SICU TBD  - Last BM--   Bowel regimen: Scheduled Senna-docusate and Miralax daily       RENAL:  #Hypokalemia  - Potassium chloride 20 mEq BID     #Urinary retention  - Bladder scan PRN  - Doxazosin 1 mg daily     - IV fluids: None  - BMP daily  - Electrolyte replacement protocol in place      ENDO:  #Hyperglycemia  # Insulin SS  - Follow glucose levels    HEME:  #Anemia  #Thrombocytosis  - Daily CBC      ID:  #afebrile  #Leukocytosis-16.2  #possible ventriculitis  #Pneumonia  -Daily CBC  -Sputum with possible ESBL-  Cont vanc. Day , Meropenem started    -follow cx  -previously on piptazo (- to )  -MRSA swab negative    Checklist:  FEN: none, replacement, tube feeds  LDA: PIV, CVC, PIV, NG, EVD  PPx:   - DVT: SCD's, heparin  - GI: Pepcid  Code: Full    Dispo: ICU      Patient discussed with Attending Dr. Casillas .    Yimi Montano DNP  Ascom: [id number]  2021      ______________________________    24 HOUR VITAL SIGNS SUMMARY:   Temperatures:  Current - Temp: 97.9  F (36.6  C); Max - Temp  Av.4  F (36.9  C)  Min: 97.9  F (36.6  C)  Max: 99.1  F (37.3  C)  Respiration range: Resp  Av.6  Min: 16  Max: 28  Pulse range: Pulse  Av.7  Min: 57  Max: 101  Blood pressure range: Systolic (24hrs), Av , Min:63 , Max:145   ; Diastolic (24hrs), Av, Min:43, Max:94    Pulse oximetry range: SpO2  Av.3 %  Min: 93 %  Max: 100 %    VENTILATOR SETTINGS:  Ventilation Mode: CMV/AC  (Continuous Mandatory Ventilation/ Assist Control)  FiO2 (%): 50 %  Rate Set (breaths/minute): 14 breaths/min  Tidal Volume Set (mL): 450 mL  PEEP (cm H2O): 8 cmH2O  Oxygen Concentration (%):  "40 %  Resp: 23        Intake/Output Summary (Last 24 hours) at 6/7/2021 0730  Last data filed at 6/7/2021 0700  Gross per 24 hour   Intake 2879.57 ml   Output 2629 ml   Net 250.57 ml       Arterial Line BP: ()/() 154/78  MAP:  [43 mmHg-211 mmHg] 103 mmHg  BP - Mean:  [] 78    PHYSICAL EXAMINATION:   /74   Pulse 57   Temp 97.9  F (36.6  C) (Axillary)   Resp 23   Ht 1.803 m (5' 11\")   Wt 71.1 kg (156 lb 12 oz)   SpO2 97%   BMI 21.86 kg/m      Precedex    General: Lying in bed, NAD  HEENT: Trach, EVD  Resp: Vent  Cardio: tele  Abdomen: +BS, Soft, non-distended, non-tender  Extremities: Warm and well perfused, peripheral pulses present, generalized edema  Skin: Not jaundiced, no rash, no ecchymoses      Neuro:  Does not open her eyes or follow commands. Pupils equal and reactive, +cough and gag. 1/5 in 4/4 extremeties      CURRENT MEDICATIONS:    [Held by provider] doxazosin  1 mg Oral or Feeding Tube Daily     guaiFENesin  10 mL Oral or Feeding Tube Q6H     [Held by provider] heparin ANTICOAGULANT  5,000 Units Subcutaneous Q8H     insulin aspart  1-12 Units Subcutaneous Q4H     ipratropium - albuterol 0.5 mg/2.5 mg/3 mL  3 mL Nebulization Q6H     [Held by provider] lisinopril  10 mg Oral or Feeding Tube Daily     meropenem  2 g Intravenous Q8H     nystatin  500,000 Units Mouth/Throat 4x Daily     [Held by provider] polyethylene glycol  17 g Oral or Feeding Tube Daily     potassium chloride  20 mEq Oral or Feeding Tube BID     protein modular  1 packet Per Feeding Tube TID     senna-docusate  1 tablet Oral or Feeding Tube BID    Or     senna-docusate  2 tablet Oral or Feeding Tube BID     sodium chloride  3 mL Nebulization Q6H     vancomycin (VANCOCIN) IV  1,750 mg (central catheter) Intravenous Q12H       PRN MEDICATIONS:  acetaminophen, bisacodyl, [Held by provider] hydrALAZINE, [Held by provider] labetalol, naloxone **OR** naloxone **OR** naloxone **OR** naloxone, ondansetron, " oxyCODONE, prochlorperazine    INFUSIONS:    dexmedetomidine 0.5 mcg/kg/hr (06/07/21 0400)     norepinephrine Stopped (06/06/21 1600)       ALLERGIES:  No Known Allergies      LABS/STUDIES:  Recent Labs   Lab Test 06/07/21  0438 06/06/21  0354 06/05/21  0425    139 136   POTASSIUM 3.8 3.8 3.8   CHLORIDE 108 105 106   CO2 31 30 26   ANIONGAP 3 5 4   * 148* 149*   BUN 24 24 25   CR 0.48* 0.47* 0.42*   RAGINI 9.5 9.6 9.6   WBC 16.2* 25.2* 16.3*   RBC 2.68* 3.03* 3.12*   HGB 8.2* 9.2* 9.6*   * 730* 726*       Recent Labs   Lab Test 05/15/21  2125 05/15/21  1222 05/15/21  0930   INR 1.20* 1.16* 1.07   PTT 30 29 27       Recent Labs   Lab 06/06/21  0701 06/06/21  0449 06/05/21  1009 06/05/21  0752   PH 7.39 7.49* 7.50* 7.47*   PCO2 51* 40 37 38   PO2 71* 161* 85 56*   HCO3 31* 31* 28 28   O2PER 60 15L PM 60 7L         I have personally reviewed all labs and imaging for this patient.

## 2021-06-07 NOTE — OR NURSING
Pt underwent EGD with PEG placement under conscious sedation. Sedation and monitoring managed by bedside ICU RN. Procedure was well tolerated by pt. Pt left in care of ICU staff immediately following procedure.       Sleene Eli RN

## 2021-06-08 ENCOUNTER — APPOINTMENT (OUTPATIENT)
Dept: CT IMAGING | Facility: CLINIC | Age: 71
DRG: 003 | End: 2021-06-08
Attending: NURSE PRACTITIONER
Payer: COMMERCIAL

## 2021-06-08 ENCOUNTER — APPOINTMENT (OUTPATIENT)
Dept: GENERAL RADIOLOGY | Facility: CLINIC | Age: 71
DRG: 003 | End: 2021-06-08
Attending: NURSE PRACTITIONER
Payer: COMMERCIAL

## 2021-06-08 LAB
ABO + RH BLD: NORMAL
ABO + RH BLD: NORMAL
ANION GAP SERPL CALCULATED.3IONS-SCNC: 2 MMOL/L (ref 3–14)
BACTERIA SPEC CULT: NORMAL
BLD GP AB SCN SERPL QL: NORMAL
BLOOD BANK CMNT PATIENT-IMP: NORMAL
BUN SERPL-MCNC: 28 MG/DL (ref 7–30)
CALCIUM SERPL-MCNC: 9.3 MG/DL (ref 8.5–10.1)
CHLORIDE SERPL-SCNC: 108 MMOL/L (ref 94–109)
CO2 SERPL-SCNC: 32 MMOL/L (ref 20–32)
CREAT SERPL-MCNC: 0.55 MG/DL (ref 0.66–1.25)
ERYTHROCYTE [DISTWIDTH] IN BLOOD BY AUTOMATED COUNT: 13.7 % (ref 10–15)
GFR SERPL CREATININE-BSD FRML MDRD: >90 ML/MIN/{1.73_M2}
GLUCOSE BLDC GLUCOMTR-MCNC: 113 MG/DL (ref 70–99)
GLUCOSE BLDC GLUCOMTR-MCNC: 120 MG/DL (ref 70–99)
GLUCOSE BLDC GLUCOMTR-MCNC: 124 MG/DL (ref 70–99)
GLUCOSE BLDC GLUCOMTR-MCNC: 129 MG/DL (ref 70–99)
GLUCOSE BLDC GLUCOMTR-MCNC: 132 MG/DL (ref 70–99)
GLUCOSE BLDC GLUCOMTR-MCNC: 156 MG/DL (ref 70–99)
GLUCOSE SERPL-MCNC: 125 MG/DL (ref 70–99)
GRAM STN SPEC: ABNORMAL
HCT VFR BLD AUTO: 27.9 % (ref 40–53)
HGB BLD-MCNC: 8.7 G/DL (ref 13.3–17.7)
Lab: ABNORMAL
Lab: NORMAL
MAGNESIUM SERPL-MCNC: 2.4 MG/DL (ref 1.6–2.3)
MCH RBC QN AUTO: 30.4 PG (ref 26.5–33)
MCHC RBC AUTO-ENTMCNC: 31.2 G/DL (ref 31.5–36.5)
MCV RBC AUTO: 98 FL (ref 78–100)
PHOSPHATE SERPL-MCNC: 2.3 MG/DL (ref 2.5–4.5)
PLATELET # BLD AUTO: 528 10E9/L (ref 150–450)
POTASSIUM SERPL-SCNC: 3.7 MMOL/L (ref 3.4–5.3)
RBC # BLD AUTO: 2.86 10E12/L (ref 4.4–5.9)
SODIUM SERPL-SCNC: 141 MMOL/L (ref 133–144)
SPECIMEN EXP DATE BLD: NORMAL
SPECIMEN SOURCE: ABNORMAL
SPECIMEN SOURCE: NORMAL
WBC # BLD AUTO: 16.6 10E9/L (ref 4–11)

## 2021-06-08 PROCEDURE — 80048 BASIC METABOLIC PNL TOTAL CA: CPT | Performed by: STUDENT IN AN ORGANIZED HEALTH CARE EDUCATION/TRAINING PROGRAM

## 2021-06-08 PROCEDURE — 250N000013 HC RX MED GY IP 250 OP 250 PS 637: Performed by: STUDENT IN AN ORGANIZED HEALTH CARE EDUCATION/TRAINING PROGRAM

## 2021-06-08 PROCEDURE — 999N000065 XR CHEST PORT 1 VIEW

## 2021-06-08 PROCEDURE — 250N000011 HC RX IP 250 OP 636: Performed by: STUDENT IN AN ORGANIZED HEALTH CARE EDUCATION/TRAINING PROGRAM

## 2021-06-08 PROCEDURE — 87205 SMEAR GRAM STAIN: CPT | Performed by: STUDENT IN AN ORGANIZED HEALTH CARE EDUCATION/TRAINING PROGRAM

## 2021-06-08 PROCEDURE — 94668 MNPJ CHEST WALL SBSQ: CPT

## 2021-06-08 PROCEDURE — 999N000185 HC STATISTIC TRANSPORT TIME EA 15 MIN

## 2021-06-08 PROCEDURE — 200N000002 HC R&B ICU UMMC

## 2021-06-08 PROCEDURE — 94003 VENT MGMT INPAT SUBQ DAY: CPT

## 2021-06-08 PROCEDURE — 86901 BLOOD TYPING SEROLOGIC RH(D): CPT | Performed by: NEUROLOGICAL SURGERY

## 2021-06-08 PROCEDURE — 258N000003 HC RX IP 258 OP 636: Performed by: NURSE PRACTITIONER

## 2021-06-08 PROCEDURE — 84100 ASSAY OF PHOSPHORUS: CPT | Performed by: STUDENT IN AN ORGANIZED HEALTH CARE EDUCATION/TRAINING PROGRAM

## 2021-06-08 PROCEDURE — 250N000009 HC RX 250: Performed by: NEUROLOGICAL SURGERY

## 2021-06-08 PROCEDURE — 86900 BLOOD TYPING SEROLOGIC ABO: CPT | Performed by: NEUROLOGICAL SURGERY

## 2021-06-08 PROCEDURE — 86850 RBC ANTIBODY SCREEN: CPT | Performed by: NEUROLOGICAL SURGERY

## 2021-06-08 PROCEDURE — 70450 CT HEAD/BRAIN W/O DYE: CPT

## 2021-06-08 PROCEDURE — 999N001017 HC STATISTIC GLUCOSE BY METER IP

## 2021-06-08 PROCEDURE — 250N000009 HC RX 250: Performed by: NURSE PRACTITIONER

## 2021-06-08 PROCEDURE — 999N000157 HC STATISTIC RCP TIME EA 10 MIN

## 2021-06-08 PROCEDURE — 70450 CT HEAD/BRAIN W/O DYE: CPT | Mod: 26 | Performed by: RADIOLOGY

## 2021-06-08 PROCEDURE — 258N000003 HC RX IP 258 OP 636: Performed by: NEUROLOGICAL SURGERY

## 2021-06-08 PROCEDURE — 250N000013 HC RX MED GY IP 250 OP 250 PS 637: Performed by: NEUROLOGICAL SURGERY

## 2021-06-08 PROCEDURE — 94640 AIRWAY INHALATION TREATMENT: CPT | Mod: 76

## 2021-06-08 PROCEDURE — 99291 CRITICAL CARE FIRST HOUR: CPT | Mod: GC | Performed by: PSYCHIATRY & NEUROLOGY

## 2021-06-08 PROCEDURE — 250N000013 HC RX MED GY IP 250 OP 250 PS 637: Performed by: NURSE PRACTITIONER

## 2021-06-08 PROCEDURE — 87077 CULTURE AEROBIC IDENTIFY: CPT | Performed by: STUDENT IN AN ORGANIZED HEALTH CARE EDUCATION/TRAINING PROGRAM

## 2021-06-08 PROCEDURE — 83735 ASSAY OF MAGNESIUM: CPT | Performed by: STUDENT IN AN ORGANIZED HEALTH CARE EDUCATION/TRAINING PROGRAM

## 2021-06-08 PROCEDURE — 250N000009 HC RX 250: Performed by: STUDENT IN AN ORGANIZED HEALTH CARE EDUCATION/TRAINING PROGRAM

## 2021-06-08 PROCEDURE — 85027 COMPLETE CBC AUTOMATED: CPT | Performed by: STUDENT IN AN ORGANIZED HEALTH CARE EDUCATION/TRAINING PROGRAM

## 2021-06-08 PROCEDURE — 71045 X-RAY EXAM CHEST 1 VIEW: CPT | Mod: 26 | Performed by: RADIOLOGY

## 2021-06-08 PROCEDURE — 250N000009 HC RX 250: Performed by: PSYCHIATRY & NEUROLOGY

## 2021-06-08 PROCEDURE — 250N000011 HC RX IP 250 OP 636: Performed by: NEUROLOGICAL SURGERY

## 2021-06-08 PROCEDURE — 87186 SC STD MICRODIL/AGAR DIL: CPT | Performed by: STUDENT IN AN ORGANIZED HEALTH CARE EDUCATION/TRAINING PROGRAM

## 2021-06-08 PROCEDURE — 87070 CULTURE OTHR SPECIMN AEROBIC: CPT | Performed by: STUDENT IN AN ORGANIZED HEALTH CARE EDUCATION/TRAINING PROGRAM

## 2021-06-08 RX ORDER — POTASSIUM CHLORIDE 1.5 G/1.58G
20 POWDER, FOR SOLUTION ORAL ONCE
Status: COMPLETED | OUTPATIENT
Start: 2021-06-08 | End: 2021-06-08

## 2021-06-08 RX ADMIN — IPRATROPIUM BROMIDE AND ALBUTEROL SULFATE 3 ML: 2.5; .5 SOLUTION RESPIRATORY (INHALATION) at 19:32

## 2021-06-08 RX ADMIN — Medication 1 PACKET: at 08:45

## 2021-06-08 RX ADMIN — POTASSIUM CHLORIDE 20 MEQ: 1.5 POWDER, FOR SOLUTION ORAL at 08:44

## 2021-06-08 RX ADMIN — GUAIFENESIN 10 ML: 100 SOLUTION ORAL at 16:04

## 2021-06-08 RX ADMIN — SODIUM CHLORIDE SOLN NEBU 3% 3 ML: 3 NEBU SOLN at 02:48

## 2021-06-08 RX ADMIN — SODIUM CHLORIDE SOLN NEBU 3% 3 ML: 3 NEBU SOLN at 13:00

## 2021-06-08 RX ADMIN — INSULIN ASPART 1 UNITS: 100 INJECTION, SOLUTION INTRAVENOUS; SUBCUTANEOUS at 09:15

## 2021-06-08 RX ADMIN — VANCOMYCIN HYDROCHLORIDE 1750 MG: 10 INJECTION, POWDER, LYOPHILIZED, FOR SOLUTION INTRAVENOUS at 05:45

## 2021-06-08 RX ADMIN — FAMOTIDINE 20 MG: 20 TABLET ORAL at 19:47

## 2021-06-08 RX ADMIN — POTASSIUM CHLORIDE 20 MEQ: 1.5 POWDER, FOR SOLUTION ORAL at 19:47

## 2021-06-08 RX ADMIN — MEROPENEM 2 G: 1 INJECTION, POWDER, FOR SOLUTION INTRAVENOUS at 17:43

## 2021-06-08 RX ADMIN — IPRATROPIUM BROMIDE AND ALBUTEROL SULFATE 3 ML: 2.5; .5 SOLUTION RESPIRATORY (INHALATION) at 09:07

## 2021-06-08 RX ADMIN — SODIUM PHOSPHATE, MONOBASIC, MONOHYDRATE AND SODIUM PHOSPHATE, DIBASIC, ANHYDROUS 9 MMOL: 276; 142 INJECTION, SOLUTION INTRAVENOUS at 05:45

## 2021-06-08 RX ADMIN — OXYCODONE HYDROCHLORIDE 5 MG: 5 TABLET ORAL at 10:33

## 2021-06-08 RX ADMIN — NYSTATIN 500000 UNITS: 500000 SUSPENSION ORAL at 08:44

## 2021-06-08 RX ADMIN — NYSTATIN 500000 UNITS: 500000 SUSPENSION ORAL at 12:38

## 2021-06-08 RX ADMIN — HEPARIN SODIUM 5000 UNITS: 5000 INJECTION, SOLUTION INTRAVENOUS; SUBCUTANEOUS at 16:03

## 2021-06-08 RX ADMIN — NYSTATIN 500000 UNITS: 500000 SUSPENSION ORAL at 19:47

## 2021-06-08 RX ADMIN — VANCOMYCIN HYDROCHLORIDE 1750 MG: 10 INJECTION, POWDER, LYOPHILIZED, FOR SOLUTION INTRAVENOUS at 18:25

## 2021-06-08 RX ADMIN — Medication 1 PACKET: at 14:04

## 2021-06-08 RX ADMIN — FAMOTIDINE 20 MG: 20 TABLET ORAL at 08:45

## 2021-06-08 RX ADMIN — DEXMEDETOMIDINE 0.5 MCG/KG/HR: 100 INJECTION, SOLUTION, CONCENTRATE INTRAVENOUS at 01:58

## 2021-06-08 RX ADMIN — SODIUM CHLORIDE SOLN NEBU 3% 3 ML: 3 NEBU SOLN at 09:07

## 2021-06-08 RX ADMIN — GUAIFENESIN 10 ML: 100 SOLUTION ORAL at 10:32

## 2021-06-08 RX ADMIN — SODIUM CHLORIDE SOLN NEBU 3% 3 ML: 3 NEBU SOLN at 19:32

## 2021-06-08 RX ADMIN — Medication 1 PACKET: at 19:47

## 2021-06-08 RX ADMIN — POTASSIUM CHLORIDE 20 MEQ: 1.5 POWDER, FOR SOLUTION ORAL at 05:45

## 2021-06-08 RX ADMIN — IPRATROPIUM BROMIDE AND ALBUTEROL SULFATE 3 ML: 2.5; .5 SOLUTION RESPIRATORY (INHALATION) at 13:00

## 2021-06-08 RX ADMIN — MEROPENEM 2 G: 1 INJECTION, POWDER, FOR SOLUTION INTRAVENOUS at 01:58

## 2021-06-08 RX ADMIN — NYSTATIN 500000 UNITS: 500000 SUSPENSION ORAL at 16:06

## 2021-06-08 RX ADMIN — GUAIFENESIN 10 ML: 100 SOLUTION ORAL at 22:04

## 2021-06-08 RX ADMIN — MEROPENEM 2 G: 1 INJECTION, POWDER, FOR SOLUTION INTRAVENOUS at 10:32

## 2021-06-08 RX ADMIN — GUAIFENESIN 10 ML: 100 SOLUTION ORAL at 03:54

## 2021-06-08 RX ADMIN — IPRATROPIUM BROMIDE AND ALBUTEROL SULFATE 3 ML: 2.5; .5 SOLUTION RESPIRATORY (INHALATION) at 02:48

## 2021-06-08 ASSESSMENT — ACTIVITIES OF DAILY LIVING (ADL)
ADLS_ACUITY_SCORE: 22
ADLS_ACUITY_SCORE: 24

## 2021-06-08 ASSESSMENT — MIFFLIN-ST. JEOR: SCORE: 1499.13

## 2021-06-08 NOTE — PROGRESS NOTES
[facility], [place]   06/08/2021  Neurosurgery Progress Note:    Assessment:  Dayron Neri is a 70 year old male presented with AMS found to have aSAH with right supraclinoid aneurysm. mFS4 and HH5. GCS 5. S/p EVD placement, DCA,s/p aneurysm clipping on 5/16. EVD replaced on 6/4, and again on 6/6 due to EVD clogging. Had to be reintubated on 6/6 due to respiratory failure.     The following conditions are also present, complicating the patient's current management, as described in the Assessment and Plan:   mechanical ventilation on day of admission, intracerebral hematoma, brain compression, acute respiratory failure, coma, based on current GCS of 5 and cerebral aneurysm rupture with hunt sims scale 5 modified lowery 4     Hypokalemia   Severe malnutrition in the context of acute on chronic illness  Respiratory failure  Pneumonia    Plan:  - EVD: 20 open  - Q1hr neuro exam  - SBP<200  - HOB > 30 degrees  - follow-up clx  -- Meropenem/Vancomycin for PNA  - Plan for tracheostomy   - Continuous cardiac monitoring while in ICU   - Glucose < 180  - Continue glucose checks  - Platelets > 100,000  - INR < 1.5  - Hemoglobin > 8  - DVT: SCDs while in bed  - Disposition: 4A  - PT/OT consulted    -----------------------------------  Myranda Woodson MD  Neurosurgery Resident, PGY-2    Please contact neurosurgery resident on call with questions.    Dial * * *[phone], enter [phone] when prompted.   -----------------------------------    Interval History: Trach and PEG placed.    Objective:   Temp:  [96.6  F (35.9  C)-97.6  F (36.4  C)] 97.5  F (36.4  C)  Pulse:  [57-97] 77  Resp:  [10-24] 19  BP: ()/(62-98) 124/84  FiO2 (%):  [40 %-60 %] 60 %  SpO2:  [94 %-100 %] 97 %  I/O last 3 completed shifts:  In: 3063.3 [I.V.:1578.3; NG/GT:525]  Out: 2902 [Urine:2450; Emesis/NG output:150; Other:27; Stool:275]    Gen: Intubated  Incision: c/d/i, drain and EVD in place  Intubated, sedated  Open eyes to  voice  Localizes b/l UE L>R  W/d RUE  W/d b/l LE    LABS:  Recent Labs   Lab 06/08/21  0350 06/07/21  0438 06/06/21  0354    142 139   POTASSIUM 3.7 3.8 3.8   CHLORIDE 108 108 105   CO2 32 31 30   ANIONGAP 2* 3 5   * 103* 148*   BUN 28 24 24   CR 0.55* 0.48* 0.47*   RAGINI 9.3 9.5 9.6       Recent Labs   Lab 06/08/21  0350   WBC 16.6*   RBC 2.86*   HGB 8.7*   HCT 27.9*   MCV 98   MCH 30.4   MCHC 31.2*   RDW 13.7   *       IMAGING:  No results found for this or any previous visit (from the past 24 hour(s)).

## 2021-06-08 NOTE — PROGRESS NOTES
[facility]  NeuroCritical Care Progress Note    Patient Name:  Dayron Neri  MRN:  [id number]    :  1950  Date of Service:  2021  Primary care provider:  No primary care provider on file.        24 HOUR EVENTS:  Trach and Peg yesterday. Will begin clamp trial.     ASSESSMENT/PLAN:  Dayron Neri is a 70 year old admitted on 5/15/2021 with subarachnoid hemorrhage (MF4, HH5) and brain compression with midline shift R>L 2/2 ruptured P-comm aneurysm. Now S/p right P-comm aneurysm clipping on 5/15. Hospital course c/b acute respiratory failure with concern for aspiration PNA, S/p re-intubation on , right frontal, left frontoparietal, and right superior parietal infarcts on CT head , and vasospasms S/p b/l ICA verapamil on .        NEURO:  Neuro:  #Subarachnoid hemorrhage (MF4, HH5), aneurysmal: 5/15/21  #Ruptured R PCOM aneurysm  Cerebral angiogram on 5/15 with confirmation of rupture for R craniotomy and clipping of PCOM aneurysm  - Neuro checks q2h (q4h at night)  - Nimodipine course completed   - Daily TCD checks completed  - HOB > 30 degrees     #Right frontal, left frontoparietal, and right superior parietal infarcts on CT head   #Bilateral Delayed Cerebral Ischemia, likely from vasospasm  Angio  with mild vasospasm, s/p b/l ICA verapamil  CTA  with diffuse b/I anterior and posterior circulation vasospasm     #IVH with subsequent hydrocephalus  #Brain compression with midline shift R>L  EVD placed 5/15, replaced  2/2 EVD malfunction, replaced again on  for occlusion   - EVD @ 20' open, ICP 4-8, drained 29mls  -will start clamp trial this AM     #Agitation  - Seroquel 12.5 mg BID PRN  - Delirium precautions        CARDIO:  ##HTN  ECHO completed on , EF 60-65%  - PTA Lisinopril 10 mg daily  - Hydralazine and Labetalol PRN  -SBP < 200        PULM:  #Aspiration pneumonia  #Thick secretions  Extubated 6/3; re-intubated   - Trach    - Continue with pulmonary hygiene  - Duonebs q6h  - Guaifenesin q6h  - 3% saline nebs q6h  - Chest Physiotherapy q6h  - Continuous pulse ox monitoring  - Maintain O2 saturations > 92%  - Resend sputum        GI:  #Severe malnutrition in the context of acute on chronic illness  - Protein modular 1 Pkt  - Multivitamins with minerals  - Diet: TF @ goal (60)  - NG  - PEG   - Last BM--   Bowel regimen: Scheduled Senna-docusate and Miralax daily         RENAL:  #Hypokalemia  - Potassium chloride 20 mEq BID     #Urinary retention  - voiding  - Doxazosin 1 mg daily     - IV fluids: None  - BMP daily  - Electrolyte replacement protocol in place        ENDO:  #Hyperglycemia  # Insulin SS  - Follow glucose levels     HEME:  #Anemia  #Thrombocytosis  - Daily CBC        ID:  #afebrile  #Leukocytosis-16.6  #possible ventriculitis  #Pneumonia  -Daily CBC  -Sputum with possible ESBL-  Cont vanc. Day , Meropenem started    -follow cx  -previously on piptazo (- to )  -MRSA swab negative  -Resend Sputum cx  -Consider PICC     Checklist:  FEN: none, replacement, tube feeds  LDA: PIV, CVC, PIV, NG, EVD  PPx:   - DVT: SCD's, heparin  - GI: Pepcid  Code: Full     Dispo: ICU        Patient discussed with Attending Dr. Casillas .    Yimi Montano DNP  Ascom: [phone]  2021      ______________________________    24 HOUR VITAL SIGNS SUMMARY:   Temperatures:  Current - Temp: 98.2  F (36.8  C); Max - Temp  Av.5  F (36.4  C)  Min: 96.7  F (35.9  C)  Max: 98.2  F (36.8  C)  Respiration range: Resp  Av.7  Min: 10  Max: 29  Pulse range: Pulse  Av.7  Min: 62  Max: 101  Blood pressure range: Systolic (24hrs), Av , Min:82 , Max:163   ; Diastolic (24hrs), Av, Min:62, Max:104    Pulse oximetry range: SpO2  Av.4 %  Min: 92 %  Max: 100 %    VENTILATOR SETTINGS:  Ventilation Mode: (S) CPAP/PS  (Continuous positive airway pressure with Pressure Support)  FiO2 (%): 60 %  Rate Set (breaths/minute): 14  "breaths/min  Tidal Volume Set (mL): 450 mL  PEEP (cm H2O): 8 cmH2O  Pressure Support (cm H2O): 7 cmH2O  Oxygen Concentration (%): 50 %  Resp: 29        Intake/Output Summary (Last 24 hours) at 6/8/2021 1218  Last data filed at 6/8/2021 1100  Gross per 24 hour   Intake 2420.3 ml   Output 2359 ml   Net 61.3 ml       BP - Mean:  [] 123    PHYSICAL EXAMINATION:   BP (!) 163/104   Pulse 101   Temp 98.2  F (36.8  C) (Oral)   Resp 29   Ht 1.803 m (5' 11\")   Wt 70.7 kg (155 lb 13.8 oz)   SpO2 100%   BMI 21.74 kg/m      Sedation off    General: Lying in bed, NAD  HEENT: Trach, EVD  Resp: Vent  Cardio: tele  Abdomen: PEG  Extremities: Warm and well perfused, peripheral pulses present, generalized edema  Skin: Not jaundiced, no rash, no ecchymoses    Neuro:  Pt awake and able to follow basic commands. Sensation intact, with grimaces to noxious stim, Moves all extremities. Right weaker than left. Pupils equal and symetric. +cough/gag      CURRENT MEDICATIONS:    [Held by provider] doxazosin  1 mg Oral or Feeding Tube Daily     famotidine  20 mg Oral BID     guaiFENesin  10 mL Oral or Feeding Tube Q6H     [Held by provider] heparin ANTICOAGULANT  5,000 Units Subcutaneous Q8H     insulin aspart  1-12 Units Subcutaneous Q4H     ipratropium - albuterol 0.5 mg/2.5 mg/3 mL  3 mL Nebulization Q6H     [Held by provider] lisinopril  10 mg Oral or Feeding Tube Daily     meropenem  2 g Intravenous Q8H     nystatin  500,000 Units Mouth/Throat 4x Daily     [Held by provider] polyethylene glycol  17 g Oral or Feeding Tube Daily     potassium chloride  20 mEq Oral or Feeding Tube BID     protein modular  1 packet Per Feeding Tube TID     senna-docusate  1 tablet Oral or Feeding Tube BID    Or     senna-docusate  2 tablet Oral or Feeding Tube BID     sodium chloride  3 mL Nebulization Q6H     vancomycin (VANCOCIN) IV  1,750 mg (central catheter) Intravenous Q12H       PRN MEDICATIONS:  acetaminophen, bisacodyl, [Held by provider] " hydrALAZINE, [Held by provider] labetalol, naloxone **OR** naloxone **OR** naloxone **OR** naloxone, ondansetron, oxyCODONE, prochlorperazine    INFUSIONS:      ALLERGIES:  No Known Allergies      LABS/STUDIES:  Recent Labs   Lab Test 06/08/21  0350 06/07/21  0438 06/06/21  0354    142 139   POTASSIUM 3.7 3.8 3.8   CHLORIDE 108 108 105   CO2 32 31 30   ANIONGAP 2* 3 5   * 103* 148*   BUN 28 24 24   CR 0.55* 0.48* 0.47*   RAGINI 9.3 9.5 9.6   WBC 16.6* 16.2* 25.2*   RBC 2.86* 2.68* 3.03*   HGB 8.7* 8.2* 9.2*   * 520* 730*       Recent Labs   Lab Test 05/15/21  2125 05/15/21  1222 05/15/21  0930   INR 1.20* 1.16* 1.07   PTT 30 29 27       Recent Labs   Lab 06/06/21  0701 06/06/21  0449 06/05/21  1009 06/05/21  0752   PH 7.39 7.49* 7.50* 7.47*   PCO2 51* 40 37 38   PO2 71* 161* 85 56*   HCO3 31* 31* 28 28   O2PER 60 15L PM 60 7L         I have personally reviewed all labs and imaging for this patient.

## 2021-06-08 NOTE — PLAN OF CARE
Major Shift Events: No neuro changes, intermittenlty following commands and moving all 4 extremities. Precedex turned off, tolerating well. EVD clamped since 0600, see flowsheet for ICP values. BP within goal. Pressure supported for 4 hours. Copious amount of tracheal secretions. Rectal pouch replaced, loose stools continued.   Plan: Neuro Q2H during the day and Q4H at night. SBP <200. Trach cares.   For vital signs and complete assessments, please see documentation flowsheets.

## 2021-06-08 NOTE — PROGRESS NOTES
[facility] at [facility] (unit 4100)         At the request of Madhu Sims RN CC, I reviewed the chart of Dayron Neri for potential admission to [facility] pending clinical readiness and bed availability.        I will continue to follow.       Angie Baez RN  LTACH Referral Specialist  [address] [address]  [email or web address]  Office: [phone]  Fax: [phone]     CONFIDENTIAL Protected under [place] Statute  145.61 et seq

## 2021-06-08 NOTE — PLAN OF CARE
Major Shift Events: Pt rouses to voice consistently. Intermittently follows commands. Moves upper extremities spontaneously, 3/5. Moves lower extremities with stimulus, 2/5. R pupil remains larger than L, brisk bilaterally. EVD 20 above EAM. ICP 4-8, OP 0-5. Hemodynamics WNL, afebrile. SR 70's-90's. CMV settings via #6 shiley trach, 60%, 14, 450, 8. Thick, tenacious secretions. Period of increased bleeding after RT suction. TF @ goal rate via PEG, 50mL Q2H flush. ABM via rectal pouch. AUOP via primo fit. Skin unchanged. Access unchanged.    Plan: Anticipate PST when clincally indicated. Continue goals of care.    For vital signs and complete assessments, please see documentation flowsheets.

## 2021-06-08 NOTE — PROGRESS NOTES
CLINICAL NUTRITION SERVICES - BRIEF NOTE    Nutrition Prescription    RECOMMENDATIONS FOR MDs/PROVIDERS TO ORDER:  - None additional from prior full assessments    Recommendations already ordered by Registered Dietitian (RD):  - RD changed orders to reflect NEW access/PEG  - Continue EN as ordered via new access  - Elevated HOB    Future/Additional Recommendations:  - Ongoing EN monitoring via new access/PEG     Nutrition Progress Note - f/u for progress towards previous nutrition POC (see previous reassessment for details)     Veronica Ramirez RD, LD, [facility]  Neuro ICU  Pager: [phone]

## 2021-06-09 ENCOUNTER — APPOINTMENT (OUTPATIENT)
Dept: CT IMAGING | Facility: CLINIC | Age: 71
DRG: 003 | End: 2021-06-09
Attending: STUDENT IN AN ORGANIZED HEALTH CARE EDUCATION/TRAINING PROGRAM
Payer: COMMERCIAL

## 2021-06-09 ENCOUNTER — APPOINTMENT (OUTPATIENT)
Dept: GENERAL RADIOLOGY | Facility: CLINIC | Age: 71
DRG: 003 | End: 2021-06-09
Attending: STUDENT IN AN ORGANIZED HEALTH CARE EDUCATION/TRAINING PROGRAM
Payer: COMMERCIAL

## 2021-06-09 ENCOUNTER — APPOINTMENT (OUTPATIENT)
Dept: PHYSICAL THERAPY | Facility: CLINIC | Age: 71
DRG: 003 | End: 2021-06-09
Attending: NEUROLOGICAL SURGERY
Payer: COMMERCIAL

## 2021-06-09 LAB
ANION GAP SERPL CALCULATED.3IONS-SCNC: 3 MMOL/L (ref 3–14)
BUN SERPL-MCNC: 24 MG/DL (ref 7–30)
CALCIUM SERPL-MCNC: 9.3 MG/DL (ref 8.5–10.1)
CHLORIDE SERPL-SCNC: 108 MMOL/L (ref 94–109)
CO2 SERPL-SCNC: 32 MMOL/L (ref 20–32)
CREAT SERPL-MCNC: 0.49 MG/DL (ref 0.66–1.25)
ERYTHROCYTE [DISTWIDTH] IN BLOOD BY AUTOMATED COUNT: 13.9 % (ref 10–15)
GFR SERPL CREATININE-BSD FRML MDRD: >90 ML/MIN/{1.73_M2}
GLUCOSE BLDC GLUCOMTR-MCNC: 114 MG/DL (ref 70–99)
GLUCOSE BLDC GLUCOMTR-MCNC: 120 MG/DL (ref 70–99)
GLUCOSE BLDC GLUCOMTR-MCNC: 136 MG/DL (ref 70–99)
GLUCOSE BLDC GLUCOMTR-MCNC: 140 MG/DL (ref 70–99)
GLUCOSE BLDC GLUCOMTR-MCNC: 148 MG/DL (ref 70–99)
GLUCOSE SERPL-MCNC: 117 MG/DL (ref 70–99)
HCT VFR BLD AUTO: 26.8 % (ref 40–53)
HGB BLD-MCNC: 8.2 G/DL (ref 13.3–17.7)
MAGNESIUM SERPL-MCNC: 2.4 MG/DL (ref 1.6–2.3)
MCH RBC QN AUTO: 30.5 PG (ref 26.5–33)
MCHC RBC AUTO-ENTMCNC: 30.6 G/DL (ref 31.5–36.5)
MCV RBC AUTO: 100 FL (ref 78–100)
PHOSPHATE SERPL-MCNC: 2.5 MG/DL (ref 2.5–4.5)
PLATELET # BLD AUTO: 484 10E9/L (ref 150–450)
POTASSIUM SERPL-SCNC: 3.8 MMOL/L (ref 3.4–5.3)
RBC # BLD AUTO: 2.69 10E12/L (ref 4.4–5.9)
SODIUM SERPL-SCNC: 143 MMOL/L (ref 133–144)
WBC # BLD AUTO: 10.4 10E9/L (ref 4–11)

## 2021-06-09 PROCEDURE — 250N000013 HC RX MED GY IP 250 OP 250 PS 637: Performed by: NURSE PRACTITIONER

## 2021-06-09 PROCEDURE — 94668 MNPJ CHEST WALL SBSQ: CPT

## 2021-06-09 PROCEDURE — 258N000003 HC RX IP 258 OP 636: Performed by: NEUROLOGICAL SURGERY

## 2021-06-09 PROCEDURE — 80048 BASIC METABOLIC PNL TOTAL CA: CPT | Performed by: STUDENT IN AN ORGANIZED HEALTH CARE EDUCATION/TRAINING PROGRAM

## 2021-06-09 PROCEDURE — 70450 CT HEAD/BRAIN W/O DYE: CPT | Mod: 26 | Performed by: RADIOLOGY

## 2021-06-09 PROCEDURE — 94640 AIRWAY INHALATION TREATMENT: CPT

## 2021-06-09 PROCEDURE — 99291 CRITICAL CARE FIRST HOUR: CPT | Mod: GC | Performed by: PSYCHIATRY & NEUROLOGY

## 2021-06-09 PROCEDURE — 250N000011 HC RX IP 250 OP 636: Performed by: NURSE PRACTITIONER

## 2021-06-09 PROCEDURE — 00P630Z REMOVAL OF DRAINAGE DEVICE FROM CEREBRAL VENTRICLE, PERCUTANEOUS APPROACH: ICD-10-PCS | Performed by: NEUROLOGICAL SURGERY

## 2021-06-09 PROCEDURE — 999N001017 HC STATISTIC GLUCOSE BY METER IP

## 2021-06-09 PROCEDURE — 71045 X-RAY EXAM CHEST 1 VIEW: CPT

## 2021-06-09 PROCEDURE — 250N000013 HC RX MED GY IP 250 OP 250 PS 637: Performed by: STUDENT IN AN ORGANIZED HEALTH CARE EDUCATION/TRAINING PROGRAM

## 2021-06-09 PROCEDURE — 250N000011 HC RX IP 250 OP 636: Performed by: STUDENT IN AN ORGANIZED HEALTH CARE EDUCATION/TRAINING PROGRAM

## 2021-06-09 PROCEDURE — 85027 COMPLETE CBC AUTOMATED: CPT | Performed by: STUDENT IN AN ORGANIZED HEALTH CARE EDUCATION/TRAINING PROGRAM

## 2021-06-09 PROCEDURE — 250N000009 HC RX 250: Performed by: NEUROLOGICAL SURGERY

## 2021-06-09 PROCEDURE — 258N000003 HC RX IP 258 OP 636: Performed by: STUDENT IN AN ORGANIZED HEALTH CARE EDUCATION/TRAINING PROGRAM

## 2021-06-09 PROCEDURE — 94003 VENT MGMT INPAT SUBQ DAY: CPT

## 2021-06-09 PROCEDURE — 200N000002 HC R&B ICU UMMC

## 2021-06-09 PROCEDURE — 258N000003 HC RX IP 258 OP 636: Performed by: NURSE PRACTITIONER

## 2021-06-09 PROCEDURE — 250N000011 HC RX IP 250 OP 636: Performed by: NEUROLOGICAL SURGERY

## 2021-06-09 PROCEDURE — 70450 CT HEAD/BRAIN W/O DYE: CPT

## 2021-06-09 PROCEDURE — 999N000157 HC STATISTIC RCP TIME EA 10 MIN

## 2021-06-09 PROCEDURE — 97530 THERAPEUTIC ACTIVITIES: CPT | Mod: GP

## 2021-06-09 PROCEDURE — 83735 ASSAY OF MAGNESIUM: CPT | Performed by: STUDENT IN AN ORGANIZED HEALTH CARE EDUCATION/TRAINING PROGRAM

## 2021-06-09 PROCEDURE — 84100 ASSAY OF PHOSPHORUS: CPT | Performed by: STUDENT IN AN ORGANIZED HEALTH CARE EDUCATION/TRAINING PROGRAM

## 2021-06-09 PROCEDURE — 250N000009 HC RX 250: Performed by: STUDENT IN AN ORGANIZED HEALTH CARE EDUCATION/TRAINING PROGRAM

## 2021-06-09 PROCEDURE — 999N000185 HC STATISTIC TRANSPORT TIME EA 15 MIN

## 2021-06-09 PROCEDURE — 250N000009 HC RX 250: Performed by: PSYCHIATRY & NEUROLOGY

## 2021-06-09 PROCEDURE — 71045 X-RAY EXAM CHEST 1 VIEW: CPT | Mod: 26 | Performed by: RADIOLOGY

## 2021-06-09 PROCEDURE — 94640 AIRWAY INHALATION TREATMENT: CPT | Mod: 76

## 2021-06-09 PROCEDURE — 250N000013 HC RX MED GY IP 250 OP 250 PS 637: Performed by: NEUROLOGICAL SURGERY

## 2021-06-09 RX ORDER — POTASSIUM CHLORIDE 1.5 G/1.58G
20 POWDER, FOR SOLUTION ORAL ONCE
Status: COMPLETED | OUTPATIENT
Start: 2021-06-09 | End: 2021-06-09

## 2021-06-09 RX ADMIN — LISINOPRIL 10 MG: 10 TABLET ORAL at 08:38

## 2021-06-09 RX ADMIN — FAMOTIDINE 20 MG: 20 TABLET ORAL at 19:38

## 2021-06-09 RX ADMIN — MEROPENEM 2 G: 1 INJECTION, POWDER, FOR SOLUTION INTRAVENOUS at 02:36

## 2021-06-09 RX ADMIN — Medication 1 PACKET: at 09:01

## 2021-06-09 RX ADMIN — GUAIFENESIN 10 ML: 100 SOLUTION ORAL at 15:43

## 2021-06-09 RX ADMIN — SODIUM PHOSPHATE, MONOBASIC, MONOHYDRATE AND SODIUM PHOSPHATE, DIBASIC, ANHYDROUS 9 MMOL: 276; 142 INJECTION, SOLUTION INTRAVENOUS at 06:19

## 2021-06-09 RX ADMIN — VANCOMYCIN HYDROCHLORIDE 1750 MG: 10 INJECTION, POWDER, LYOPHILIZED, FOR SOLUTION INTRAVENOUS at 06:19

## 2021-06-09 RX ADMIN — MEROPENEM 2 G: 1 INJECTION, POWDER, FOR SOLUTION INTRAVENOUS at 17:30

## 2021-06-09 RX ADMIN — SODIUM CHLORIDE SOLN NEBU 3% 3 ML: 3 NEBU SOLN at 14:21

## 2021-06-09 RX ADMIN — INSULIN ASPART 1 UNITS: 100 INJECTION, SOLUTION INTRAVENOUS; SUBCUTANEOUS at 15:57

## 2021-06-09 RX ADMIN — HEPARIN SODIUM 5000 UNITS: 5000 INJECTION, SOLUTION INTRAVENOUS; SUBCUTANEOUS at 15:43

## 2021-06-09 RX ADMIN — NYSTATIN 500000 UNITS: 500000 SUSPENSION ORAL at 15:43

## 2021-06-09 RX ADMIN — INSULIN ASPART 1 UNITS: 100 INJECTION, SOLUTION INTRAVENOUS; SUBCUTANEOUS at 09:07

## 2021-06-09 RX ADMIN — Medication 1 PACKET: at 19:39

## 2021-06-09 RX ADMIN — Medication 1 PACKET: at 15:43

## 2021-06-09 RX ADMIN — GUAIFENESIN 10 ML: 100 SOLUTION ORAL at 22:02

## 2021-06-09 RX ADMIN — NYSTATIN 500000 UNITS: 500000 SUSPENSION ORAL at 12:43

## 2021-06-09 RX ADMIN — MEROPENEM 2 G: 1 INJECTION, POWDER, FOR SOLUTION INTRAVENOUS at 12:39

## 2021-06-09 RX ADMIN — SODIUM CHLORIDE SOLN NEBU 3% 3 ML: 3 NEBU SOLN at 03:46

## 2021-06-09 RX ADMIN — DOCUSATE SODIUM 50 MG AND SENNOSIDES 8.6 MG 2 TABLET: 8.6; 5 TABLET, FILM COATED ORAL at 08:38

## 2021-06-09 RX ADMIN — FAMOTIDINE 20 MG: 20 TABLET ORAL at 08:39

## 2021-06-09 RX ADMIN — DOCUSATE SODIUM 50 MG AND SENNOSIDES 8.6 MG 2 TABLET: 8.6; 5 TABLET, FILM COATED ORAL at 19:39

## 2021-06-09 RX ADMIN — ACETAMINOPHEN 650 MG: 325 TABLET, FILM COATED ORAL at 19:39

## 2021-06-09 RX ADMIN — POTASSIUM CHLORIDE 20 MEQ: 1.5 POWDER, FOR SOLUTION ORAL at 19:39

## 2021-06-09 RX ADMIN — SODIUM CHLORIDE SOLN NEBU 3% 3 ML: 3 NEBU SOLN at 20:37

## 2021-06-09 RX ADMIN — NYSTATIN 500000 UNITS: 500000 SUSPENSION ORAL at 08:38

## 2021-06-09 RX ADMIN — IPRATROPIUM BROMIDE AND ALBUTEROL SULFATE 3 ML: 2.5; .5 SOLUTION RESPIRATORY (INHALATION) at 08:32

## 2021-06-09 RX ADMIN — POTASSIUM CHLORIDE 20 MEQ: 1.5 POWDER, FOR SOLUTION ORAL at 05:27

## 2021-06-09 RX ADMIN — IPRATROPIUM BROMIDE AND ALBUTEROL SULFATE 3 ML: 2.5; .5 SOLUTION RESPIRATORY (INHALATION) at 20:37

## 2021-06-09 RX ADMIN — NYSTATIN 500000 UNITS: 500000 SUSPENSION ORAL at 19:39

## 2021-06-09 RX ADMIN — GUAIFENESIN 10 ML: 100 SOLUTION ORAL at 12:36

## 2021-06-09 RX ADMIN — IPRATROPIUM BROMIDE AND ALBUTEROL SULFATE 3 ML: 2.5; .5 SOLUTION RESPIRATORY (INHALATION) at 14:21

## 2021-06-09 RX ADMIN — IPRATROPIUM BROMIDE AND ALBUTEROL SULFATE 3 ML: 2.5; .5 SOLUTION RESPIRATORY (INHALATION) at 03:46

## 2021-06-09 RX ADMIN — POTASSIUM CHLORIDE 20 MEQ: 1.5 POWDER, FOR SOLUTION ORAL at 08:38

## 2021-06-09 RX ADMIN — GUAIFENESIN 10 ML: 100 SOLUTION ORAL at 03:40

## 2021-06-09 ASSESSMENT — ACTIVITIES OF DAILY LIVING (ADL)
ADLS_ACUITY_SCORE: 22

## 2021-06-09 ASSESSMENT — MIFFLIN-ST. JEOR: SCORE: 1493.13

## 2021-06-09 NOTE — PROGRESS NOTES
Brief Surgery Progress note (Late entry, patient seen 6/8/2021)    PPD1 from bedside PEG placement. Tolerating meds and feeds at goal through feeding tube. No issues.     Abdomen is soft, non-distended. PEG intact, no surrounding erythema or drainage. Bumper ~3 cm at the skin.     PEG to remain in place for a minimum of 6 weeks. Avoid gauze placement between skin and bumper, to avoid stress on newly forming tube tract. General Surgery will sign off at this time. Please do not hesitate to contact us with any questions or concerns regarding PEG tube.    Discussed with SICU fellow and staff.     Violet Barger MD  PGY-1 Surgery

## 2021-06-09 NOTE — PROGRESS NOTES
[facility]  NeuroCritical Care Progress Note    Patient Name:  Dayron Neri  MRN:  [id number]    :  1950  Date of Service:  2021  Primary care provider:  No primary care provider on file.        24 HOUR EVENTS:  Plan for EVD removal today.     ASSESSMENT/PLAN:  Dayron Neri is a 70 year old admitted on 5/15/2021 with subarachnoid hemorrhage (MF4, HH5) and brain compression with midline shift R>L 2/2 ruptured P-comm aneurysm. Now S/p right P-comm aneurysm clipping on 5/15. Hospital course c/b acute respiratory failure with concern for aspiration PNA, S/p re-intubation on , right frontal, left frontoparietal, and right superior parietal infarcts on CT head , and vasospasms S/p b/l ICA verapamil on .        NEURO:  Neuro:  #Subarachnoid hemorrhage (MF4, HH5), aneurysmal: 5/15/21  #Ruptured R PCOM aneurysm  Cerebral angiogram on 5/15 with confirmation of rupture for R craniotomy and clipping of PCOM aneurysm  - Neuro checks q2h (q4h at night)  - Nimodipine course completed   - Daily TCD checks completed  - HOB > 30 degrees     #Right frontal, left frontoparietal, and right superior parietal infarcts on CT head   #Bilateral Delayed Cerebral Ischemia, likely from vasospasm  Angio  with mild vasospasm, s/p b/l ICA verapamil  CTA  with diffuse b/I anterior and posterior circulation vasospasm     #IVH with subsequent hydrocephalus  #Brain compression with midline shift R>L  EVD placed 5/15, replaced  2/2 EVD malfunction, replaced again on  for occlusion   - EVD clamped, will be removed.  - AM CT showed stable vents       #Agitation  - Seroquel 12.5 mg BID PRN  - Delirium precautions        CARDIO:  ##HTN  ECHO completed on , EF 60-65%  - PTA Lisinopril 10 mg daily  - Hydralazine and Labetalol PRN  -SBP < 200        PULM:  #Aspiration pneumonia  #Thick secretions  Extubated 6/3; re-intubated   - Trach   - Continue with pulmonary  hygiene  - Duonebs q6h  - Guaifenesin q6h  - 3% saline nebs q6h  - Chest Physiotherapy q6h  - Continuous pulse ox monitoring  - Maintain O2 saturations > 92%        GI:  #Severe malnutrition in the context of acute on chronic illness  - Protein modular 1 Pkt  - Multivitamins with minerals  - Diet: TF @ goal (60)  - NG  - PEG   - Last BM--   Bowel regimen: Scheduled Senna-docusate and Miralax daily         RENAL:  #Hypokalemia  - Potassium chloride 20 mEq BID     #Urinary retention  - voiding  - Doxazosin 1 mg daily     - IV fluids: None  - BMP daily  - Electrolyte replacement protocol in place        ENDO:  #Hyperglycemia  # Insulin SS  - Follow glucose levels     HEME:  #Anemia  #Thrombocytosis  - Daily CBC        ID:  #afebrile  #Leukocytosis-10.4  #possible ventriculitis  #Pneumonia  -Daily CBC  -Sputum with possible ESBL-  discontinue vanc. Meropenem started 6/5 x 7 days  -follow cx  -previously on piptazo (- to )  -MRSA swab negative  -Sputum cx Pending     Checklist:  FEN: none, replacement, tube feeds  LDA: PIV, CVC, PEG, Trach   PPx:   - DVT: SCD's, heparin  - GI: Pepcid  Code: Full     Dispo: ICU        Patient discussed with Attending Dr. Casillas .    Yimi Montano DNP  Ascom: [phone]  2021      ______________________________    24 HOUR VITAL SIGNS SUMMARY:   Temperatures:  Current - Temp: 97.6  F (36.4  C); Max - Temp  Av.5  F (36.9  C)  Min: 97.6  F (36.4  C)  Max: 99.1  F (37.3  C)  Respiration range: Resp  Av.5  Min: 12  Max: 23  Pulse range: Pulse  Av.8  Min: 78  Max: 92  Blood pressure range: Systolic (24hrs), Av , Min:123 , Max:157   ; Diastolic (24hrs), Av, Min:84, Max:103    Pulse oximetry range: SpO2  Av.2 %  Min: 97 %  Max: 100 %    VENTILATOR SETTINGS:  Ventilation Mode: CMV/AC  (Continuous Mandatory Ventilation/ Assist Control)  FiO2 (%): 30 %  Rate Set (breaths/minute): 14 breaths/min  Tidal Volume Set (mL): 450 mL  PEEP (cm H2O): 8  "cmH2O  Pressure Support (cm H2O): (S) 7 cmH2O  Oxygen Concentration (%): 30 %  Resp: 17        Intake/Output Summary (Last 24 hours) at 6/9/2021 1256  Last data filed at 6/9/2021 0700  Gross per 24 hour   Intake 2690 ml   Output 2550 ml   Net 140 ml       BP - Mean:  [] 111    PHYSICAL EXAMINATION:   BP (!) 138/98 (BP Location: Right arm)   Pulse 78   Temp 97.6  F (36.4  C) (Oral)   Resp 17   Ht 1.803 m (5' 11\")   Wt 70.1 kg (154 lb 8.7 oz)   SpO2 99%   BMI 21.55 kg/m      General: Lying in bed, NAD  HEENT: Trach, EVD  Resp: Vent  Cardio: tele  Abdomen: PEG  Extremities: Warm and well perfused, peripheral pulses present, generalized edema  Skin: Not jaundiced, no rash, no ecchymoses     Neuro:  Pt awake and able to follow basic commands. Sensation intact, with grimaces to noxious stim, Moves all extremities. Right weaker than left. Pupils equal and symetric. +cough/gag      CURRENT MEDICATIONS:    [Held by provider] doxazosin  1 mg Oral or Feeding Tube Daily     famotidine  20 mg Oral BID     guaiFENesin  10 mL Oral or Feeding Tube Q6H     [Held by provider] heparin ANTICOAGULANT  5,000 Units Subcutaneous Q8H     insulin aspart  1-12 Units Subcutaneous Q4H     ipratropium - albuterol 0.5 mg/2.5 mg/3 mL  3 mL Nebulization Q6H     lisinopril  10 mg Oral or Feeding Tube Daily     meropenem  2 g Intravenous Q8H     nystatin  500,000 Units Mouth/Throat 4x Daily     [Held by provider] polyethylene glycol  17 g Oral or Feeding Tube Daily     potassium chloride  20 mEq Oral or Feeding Tube BID     protein modular  1 packet Per Feeding Tube TID     senna-docusate  1 tablet Oral or Feeding Tube BID    Or     senna-docusate  2 tablet Oral or Feeding Tube BID     sodium chloride  3 mL Nebulization Q6H     vancomycin (VANCOCIN) IV  1,750 mg (central catheter) Intravenous Q12H       PRN MEDICATIONS:  acetaminophen, bisacodyl, hydrALAZINE, labetalol    INFUSIONS:      ALLERGIES:  No Known " Allergies      LABS/STUDIES:  Recent Labs   Lab Test 06/09/21  0340 06/08/21  0350 06/07/21  0438    141 142   POTASSIUM 3.8 3.7 3.8   CHLORIDE 108 108 108   CO2 32 32 31   ANIONGAP 3 2* 3   * 125* 103*   BUN 24 28 24   CR 0.49* 0.55* 0.48*   RAGINI 9.3 9.3 9.5   WBC 10.4 16.6* 16.2*   RBC 2.69* 2.86* 2.68*   HGB 8.2* 8.7* 8.2*   * 528* 520*       Recent Labs   Lab Test 05/15/21  2125 05/15/21  1222 05/15/21  0930   INR 1.20* 1.16* 1.07   PTT 30 29 27       Recent Labs   Lab 06/06/21  0701 06/06/21  0449 06/05/21  1009 06/05/21  0752   PH 7.39 7.49* 7.50* 7.47*   PCO2 51* 40 37 38   PO2 71* 161* 85 56*   HCO3 31* 31* 28 28   O2PER 60 15L PM 60 7L         I have personally reviewed all labs and imaging for this patient.

## 2021-06-09 NOTE — PROGRESS NOTES
Neurosurgery Brief Progress Note    External ventricular drain removal    Drain not deemed to be necessary with low output. Drain site was prepped in usual sterile fashion with chloraprep. The drain was taken off suction. The sutures securing the drain were cut and the site was re-prepped. The drain was removed with tip intact. A single figure of 8 stitch with 4-0 monocryl was placed with adequate hemostasis. No immediate complication was observed and the patient tolerated the procedure well.     Adrian Paredes MD   Neurosurgery, PGY-3    Please contact neurosurgery resident on call with questions.    Dial * * *[phone], enter [phone] when prompted.

## 2021-06-09 NOTE — PROGRESS NOTES
[facility], [place]   06/09/2021  Neurosurgery Progress Note:    Assessment:  Dayron Neri is a 70 year old male presented with AMS found to have aSAH with right supraclinoid aneurysm. mFS4 and HH5. GCS 5. S/p EVD placement, DCA,s/p aneurysm clipping on 5/16. EVD replaced on 6/4, and again on 6/6 due to EVD clogging. Had to be reintubated on 6/6 due to respiratory failure.     The following conditions are also present, complicating the patient's current management, as described in the Assessment and Plan:   mechanical ventilation on day of admission, intracerebral hematoma, brain compression, acute respiratory failure, coma, based on current GCS of 5 and cerebral aneurysm rupture with hunt sims scale 5 modified lowery 4     Hypokalemia   Severe malnutrition in the context of acute on chronic illness  Respiratory failure  Pneumonia    Plan:  - EVD: Clamped; plan to remove today  - Q1hr neuro exam  - SBP<200  - HOB > 30 degrees  - follow-up clx  -- Meropenem/Vancomycin for PNA  - Plan for tracheostomy   - Continuous cardiac monitoring while in ICU   - Glucose < 180  - Continue glucose checks  - Platelets > 100,000  - INR < 1.5  - Hemoglobin > 8  - DVT: SCDs while in bed  - Disposition: 4A  - PT/OT consulted    -----------------------------------  Myranda Woodson MD  Neurosurgery Resident, PGY-2    Please contact neurosurgery resident on call with questions.    Dial * * *[phone], enter [phone] when prompted.   -----------------------------------    Interval History: EVD clamped, plan to remove today    Objective:   Temp:  [97.7  F (36.5  C)-99.1  F (37.3  C)] 99.1  F (37.3  C)  Pulse:  [] 86  Resp:  [12-29] 22  BP: (123-163)/() 141/96  FiO2 (%):  [50 %-60 %] 50 %  SpO2:  [92 %-100 %] 100 %  I/O last 3 completed shifts:  In: 3274.8 [I.V.:964.8; NG/GT:870]  Out: 2565 [Urine:2550; Other:15]    Gen: Intubated  Incision: c/d/i, drain and EVD in place  Intubated, sedated  Open eyes to  voice  Localizes b/l UE L>R  W/d RUE  W/d b/l LE    LABS:  Recent Labs   Lab 06/09/21  0340 06/08/21  0350 06/07/21  0438    141 142   POTASSIUM 3.8 3.7 3.8   CHLORIDE 108 108 108   CO2 32 32 31   ANIONGAP 3 2* 3   * 125* 103*   BUN 24 28 24   CR 0.49* 0.55* 0.48*   RAGINI 9.3 9.3 9.5       Recent Labs   Lab 06/09/21  0340   WBC 10.4   RBC 2.69*   HGB 8.2*   HCT 26.8*      MCH 30.5   MCHC 30.6*   RDW 13.9   *       IMAGING:  Recent Results (from the past 24 hour(s))   XR Chest Port 1 View    Narrative    XR CHEST PORT 1 VIEW  6/8/2021 10:06 AM      HISTORY: new trach. Pna    COMPARISON: 6/6/2021    FINDINGS:   AP views of the chest. Interval extubation with new tracheostomy tip  overlying the mid to upper thoracic trachea. Interval removal of  enteric and feeding tubes. Left subclavian central line tip overlying  the innominate confluence. Trachea is midline when accounting for  patient positioning. Cardiac silhouette is similar in size. No  pneumothorax or significant right pleural effusion. Probable trace  left pleural effusion. Decreased perihilar predominant interstitial  and airspace opacities. Decreased left basilar/retrocardiac opacities.      Impression    IMPRESSION:   1. New tracheostomy tip overlying the mid to upper thoracic trachea.  2. Decreased perihilar predominant interstitial and airspace  opacities, favored edema and atelectasis versus infection.  3. Decreased left basilar/retrocardiac consolidation.  4. Probable trace left pleural effusion.    I have personally reviewed the examination and initial interpretation  and I agree with the findings.    ADARSH BHAKTA MD

## 2021-06-09 NOTE — PROGRESS NOTES
Major Shift Events: Pt rouses to voice consistently. Intermittently follows commands. Moves upper extremities spontaneously, 3/5. Moves lower extremities with stimulus, 2/5. R pupil remains larger than L, brisk bilaterally. EVD 20 above EAM, clamped over night. ICP 6-10. Hemodynamics WNL, afebrile. SR 70's-90's. CMV settings via #6 shiley trach, 50%, 14, 450, 8. Thick, tenacious secretions persist. TF @ goal rate via PEG, 50mL Q2H flush. ABM via rectal pouch. AUOP via primo fit. Skin unchanged. Access unchanged.     Plan: Anticipate PST when clincally indicated. Continue goals of care.     For vital signs and complete assessments, please see documentation flowsheets.

## 2021-06-09 NOTE — PLAN OF CARE
Major Shift Events:  EVD removed.   Neuro: Opens eyes to voice. Moves UE and LLE. Move RLE to light stimulation. Pupals 5 BL.  CV: SR with old ST elevation and LBBB. MAP>70 and SBP<200.  Resp: Trach in place. CMV 50% fio2. 16 Resp, 450 TV, 5 peep. Large amount of copious white thick secretions. Failed SBT after about an hour.  GI: Primofit in place. Good UO.   : Peg in place. TF @ 60 mL/HR. goal. Watery/molasses like consistency  Plan: Plan to remove Subclavian line and place picc. Longterm antibiotics (vanco/meropenem ) and discharge to LTAC.  For vital signs and complete assessments, please see documentation flowsheets.

## 2021-06-10 LAB
ANION GAP SERPL CALCULATED.3IONS-SCNC: 2 MMOL/L (ref 3–14)
BUN SERPL-MCNC: 23 MG/DL (ref 7–30)
CALCIUM SERPL-MCNC: 9.3 MG/DL (ref 8.5–10.1)
CHLORIDE SERPL-SCNC: 105 MMOL/L (ref 94–109)
CO2 SERPL-SCNC: 35 MMOL/L (ref 20–32)
CREAT SERPL-MCNC: 0.52 MG/DL (ref 0.66–1.25)
ERYTHROCYTE [DISTWIDTH] IN BLOOD BY AUTOMATED COUNT: 13.6 % (ref 10–15)
GFR SERPL CREATININE-BSD FRML MDRD: >90 ML/MIN/{1.73_M2}
GLUCOSE BLDC GLUCOMTR-MCNC: 133 MG/DL (ref 70–99)
GLUCOSE BLDC GLUCOMTR-MCNC: 140 MG/DL (ref 70–99)
GLUCOSE BLDC GLUCOMTR-MCNC: 141 MG/DL (ref 70–99)
GLUCOSE BLDC GLUCOMTR-MCNC: 143 MG/DL (ref 70–99)
GLUCOSE BLDC GLUCOMTR-MCNC: 153 MG/DL (ref 70–99)
GLUCOSE BLDC GLUCOMTR-MCNC: 155 MG/DL (ref 70–99)
GLUCOSE SERPL-MCNC: 134 MG/DL (ref 70–99)
HCT VFR BLD AUTO: 26.5 % (ref 40–53)
HGB BLD-MCNC: 8.1 G/DL (ref 13.3–17.7)
MAGNESIUM SERPL-MCNC: 2.4 MG/DL (ref 1.6–2.3)
MCH RBC QN AUTO: 30.7 PG (ref 26.5–33)
MCHC RBC AUTO-ENTMCNC: 30.6 G/DL (ref 31.5–36.5)
MCV RBC AUTO: 100 FL (ref 78–100)
PHOSPHATE SERPL-MCNC: 2.5 MG/DL (ref 2.5–4.5)
PLATELET # BLD AUTO: 427 10E9/L (ref 150–450)
POTASSIUM SERPL-SCNC: 3.7 MMOL/L (ref 3.4–5.3)
RBC # BLD AUTO: 2.64 10E12/L (ref 4.4–5.9)
SODIUM SERPL-SCNC: 142 MMOL/L (ref 133–144)
WBC # BLD AUTO: 9.4 10E9/L (ref 4–11)

## 2021-06-10 PROCEDURE — 94668 MNPJ CHEST WALL SBSQ: CPT

## 2021-06-10 PROCEDURE — 258N000003 HC RX IP 258 OP 636: Performed by: NURSE PRACTITIONER

## 2021-06-10 PROCEDURE — 250N000009 HC RX 250: Performed by: PSYCHIATRY & NEUROLOGY

## 2021-06-10 PROCEDURE — 250N000011 HC RX IP 250 OP 636: Performed by: STUDENT IN AN ORGANIZED HEALTH CARE EDUCATION/TRAINING PROGRAM

## 2021-06-10 PROCEDURE — 85027 COMPLETE CBC AUTOMATED: CPT | Performed by: STUDENT IN AN ORGANIZED HEALTH CARE EDUCATION/TRAINING PROGRAM

## 2021-06-10 PROCEDURE — 83735 ASSAY OF MAGNESIUM: CPT | Performed by: STUDENT IN AN ORGANIZED HEALTH CARE EDUCATION/TRAINING PROGRAM

## 2021-06-10 PROCEDURE — 999N000157 HC STATISTIC RCP TIME EA 10 MIN

## 2021-06-10 PROCEDURE — 200N000002 HC R&B ICU UMMC

## 2021-06-10 PROCEDURE — 94640 AIRWAY INHALATION TREATMENT: CPT | Mod: 76

## 2021-06-10 PROCEDURE — 999N001017 HC STATISTIC GLUCOSE BY METER IP

## 2021-06-10 PROCEDURE — 250N000009 HC RX 250: Performed by: STUDENT IN AN ORGANIZED HEALTH CARE EDUCATION/TRAINING PROGRAM

## 2021-06-10 PROCEDURE — 80048 BASIC METABOLIC PNL TOTAL CA: CPT | Performed by: STUDENT IN AN ORGANIZED HEALTH CARE EDUCATION/TRAINING PROGRAM

## 2021-06-10 PROCEDURE — 250N000013 HC RX MED GY IP 250 OP 250 PS 637: Performed by: STUDENT IN AN ORGANIZED HEALTH CARE EDUCATION/TRAINING PROGRAM

## 2021-06-10 PROCEDURE — 250N000013 HC RX MED GY IP 250 OP 250 PS 637: Performed by: NEUROLOGICAL SURGERY

## 2021-06-10 PROCEDURE — 84100 ASSAY OF PHOSPHORUS: CPT | Performed by: STUDENT IN AN ORGANIZED HEALTH CARE EDUCATION/TRAINING PROGRAM

## 2021-06-10 PROCEDURE — 250N000013 HC RX MED GY IP 250 OP 250 PS 637: Performed by: NURSE PRACTITIONER

## 2021-06-10 PROCEDURE — 94003 VENT MGMT INPAT SUBQ DAY: CPT

## 2021-06-10 PROCEDURE — 250N000011 HC RX IP 250 OP 636: Performed by: NURSE PRACTITIONER

## 2021-06-10 PROCEDURE — 94664 DEMO&/EVAL PT USE INHALER: CPT

## 2021-06-10 RX ORDER — POTASSIUM CHLORIDE 1.5 G/1.58G
20 POWDER, FOR SOLUTION ORAL 2 TIMES DAILY
Status: ON HOLD | DISCHARGE
Start: 2021-06-10 | End: 2021-09-27

## 2021-06-10 RX ORDER — HEPARIN SODIUM 5000 [USP'U]/.5ML
5000 INJECTION, SOLUTION INTRAVENOUS; SUBCUTANEOUS EVERY 8 HOURS
Status: ON HOLD | DISCHARGE
Start: 2021-06-10 | End: 2021-09-27

## 2021-06-10 RX ORDER — BISACODYL 10 MG
10 SUPPOSITORY, RECTAL RECTAL DAILY PRN
Status: ON HOLD | DISCHARGE
Start: 2021-06-10 | End: 2021-09-27

## 2021-06-10 RX ORDER — ACETAMINOPHEN 325 MG/1
650 TABLET ORAL EVERY 4 HOURS PRN
Status: ON HOLD | DISCHARGE
Start: 2021-06-10 | End: 2021-09-27

## 2021-06-10 RX ORDER — POTASSIUM CHLORIDE 20MEQ/15ML
20 LIQUID (ML) ORAL ONCE
Status: COMPLETED | OUTPATIENT
Start: 2021-06-10 | End: 2021-06-10

## 2021-06-10 RX ORDER — NYSTATIN 100000/ML
500000 SUSPENSION, ORAL (FINAL DOSE FORM) ORAL 4 TIMES DAILY
Status: ON HOLD | DISCHARGE
Start: 2021-06-10 | End: 2021-09-27

## 2021-06-10 RX ORDER — LISINOPRIL 10 MG/1
10 TABLET ORAL DAILY
Status: ON HOLD | DISCHARGE
Start: 2021-06-11 | End: 2021-09-27

## 2021-06-10 RX ORDER — IPRATROPIUM BROMIDE AND ALBUTEROL SULFATE 2.5; .5 MG/3ML; MG/3ML
3 SOLUTION RESPIRATORY (INHALATION) EVERY 6 HOURS
Status: ON HOLD | DISCHARGE
Start: 2021-06-10 | End: 2021-09-27

## 2021-06-10 RX ORDER — FAMOTIDINE 20 MG/1
20 TABLET, FILM COATED ORAL 2 TIMES DAILY
Status: ON HOLD | DISCHARGE
Start: 2021-06-10 | End: 2021-09-27

## 2021-06-10 RX ORDER — AMINO AC/PROTEIN HYDR/WHEY PRO 10G-100/30
1 LIQUID (ML) ORAL 3 TIMES DAILY
DISCHARGE
Start: 2021-06-10

## 2021-06-10 RX ORDER — AMOXICILLIN 250 MG
2 CAPSULE ORAL 2 TIMES DAILY
Status: ON HOLD | DISCHARGE
Start: 2021-06-10 | End: 2021-09-27

## 2021-06-10 RX ADMIN — SODIUM CHLORIDE SOLN NEBU 3% 3 ML: 3 NEBU SOLN at 15:07

## 2021-06-10 RX ADMIN — HEPARIN SODIUM 5000 UNITS: 5000 INJECTION, SOLUTION INTRAVENOUS; SUBCUTANEOUS at 00:50

## 2021-06-10 RX ADMIN — Medication 1 PACKET: at 20:31

## 2021-06-10 RX ADMIN — INSULIN ASPART 1 UNITS: 100 INJECTION, SOLUTION INTRAVENOUS; SUBCUTANEOUS at 13:12

## 2021-06-10 RX ADMIN — HEPARIN SODIUM 5000 UNITS: 5000 INJECTION, SOLUTION INTRAVENOUS; SUBCUTANEOUS at 17:32

## 2021-06-10 RX ADMIN — MEROPENEM 2 G: 1 INJECTION, POWDER, FOR SOLUTION INTRAVENOUS at 09:58

## 2021-06-10 RX ADMIN — Medication 1 PACKET: at 08:37

## 2021-06-10 RX ADMIN — INSULIN ASPART 1 UNITS: 100 INJECTION, SOLUTION INTRAVENOUS; SUBCUTANEOUS at 20:52

## 2021-06-10 RX ADMIN — INSULIN ASPART 1 UNITS: 100 INJECTION, SOLUTION INTRAVENOUS; SUBCUTANEOUS at 04:03

## 2021-06-10 RX ADMIN — POTASSIUM & SODIUM PHOSPHATES POWDER PACK 280-160-250 MG 1 PACKET: 280-160-250 PACK at 20:29

## 2021-06-10 RX ADMIN — GUAIFENESIN 10 ML: 100 SOLUTION ORAL at 09:58

## 2021-06-10 RX ADMIN — IPRATROPIUM BROMIDE AND ALBUTEROL SULFATE 3 ML: 2.5; .5 SOLUTION RESPIRATORY (INHALATION) at 20:22

## 2021-06-10 RX ADMIN — INSULIN ASPART 1 UNITS: 100 INJECTION, SOLUTION INTRAVENOUS; SUBCUTANEOUS at 08:42

## 2021-06-10 RX ADMIN — NYSTATIN 500000 UNITS: 500000 SUSPENSION ORAL at 08:36

## 2021-06-10 RX ADMIN — DOCUSATE SODIUM 50 MG AND SENNOSIDES 8.6 MG 1 TABLET: 8.6; 5 TABLET, FILM COATED ORAL at 20:28

## 2021-06-10 RX ADMIN — HEPARIN SODIUM 5000 UNITS: 5000 INJECTION, SOLUTION INTRAVENOUS; SUBCUTANEOUS at 08:36

## 2021-06-10 RX ADMIN — NYSTATIN 500000 UNITS: 500000 SUSPENSION ORAL at 13:09

## 2021-06-10 RX ADMIN — Medication 1 PACKET: at 13:09

## 2021-06-10 RX ADMIN — ACETAMINOPHEN 650 MG: 325 TABLET, FILM COATED ORAL at 20:29

## 2021-06-10 RX ADMIN — DOCUSATE SODIUM 50 MG AND SENNOSIDES 8.6 MG 2 TABLET: 8.6; 5 TABLET, FILM COATED ORAL at 08:35

## 2021-06-10 RX ADMIN — IPRATROPIUM BROMIDE AND ALBUTEROL SULFATE 3 ML: 2.5; .5 SOLUTION RESPIRATORY (INHALATION) at 02:27

## 2021-06-10 RX ADMIN — IPRATROPIUM BROMIDE AND ALBUTEROL SULFATE 3 ML: 2.5; .5 SOLUTION RESPIRATORY (INHALATION) at 08:37

## 2021-06-10 RX ADMIN — POTASSIUM CHLORIDE 20 MEQ: 20 SOLUTION ORAL at 05:40

## 2021-06-10 RX ADMIN — SODIUM CHLORIDE SOLN NEBU 3% 3 ML: 3 NEBU SOLN at 02:27

## 2021-06-10 RX ADMIN — MEROPENEM 2 G: 1 INJECTION, POWDER, FOR SOLUTION INTRAVENOUS at 01:57

## 2021-06-10 RX ADMIN — MEROPENEM 2 G: 1 INJECTION, POWDER, FOR SOLUTION INTRAVENOUS at 18:18

## 2021-06-10 RX ADMIN — POTASSIUM CHLORIDE 20 MEQ: 1.5 POWDER, FOR SOLUTION ORAL at 08:36

## 2021-06-10 RX ADMIN — NYSTATIN 500000 UNITS: 500000 SUSPENSION ORAL at 17:32

## 2021-06-10 RX ADMIN — FAMOTIDINE 20 MG: 20 TABLET ORAL at 20:40

## 2021-06-10 RX ADMIN — INSULIN ASPART 1 UNITS: 100 INJECTION, SOLUTION INTRAVENOUS; SUBCUTANEOUS at 17:37

## 2021-06-10 RX ADMIN — GUAIFENESIN 10 ML: 100 SOLUTION ORAL at 03:34

## 2021-06-10 RX ADMIN — POTASSIUM CHLORIDE 20 MEQ: 1.5 POWDER, FOR SOLUTION ORAL at 20:29

## 2021-06-10 RX ADMIN — LISINOPRIL 10 MG: 10 TABLET ORAL at 08:35

## 2021-06-10 RX ADMIN — SODIUM CHLORIDE SOLN NEBU 3% 3 ML: 3 NEBU SOLN at 08:36

## 2021-06-10 RX ADMIN — IPRATROPIUM BROMIDE AND ALBUTEROL SULFATE 3 ML: 2.5; .5 SOLUTION RESPIRATORY (INHALATION) at 15:07

## 2021-06-10 RX ADMIN — SODIUM CHLORIDE SOLN NEBU 3% 3 ML: 3 NEBU SOLN at 20:22

## 2021-06-10 RX ADMIN — NYSTATIN 500000 UNITS: 500000 SUSPENSION ORAL at 20:29

## 2021-06-10 RX ADMIN — FAMOTIDINE 20 MG: 20 TABLET ORAL at 08:35

## 2021-06-10 RX ADMIN — POTASSIUM & SODIUM PHOSPHATES POWDER PACK 280-160-250 MG 1 PACKET: 280-160-250 PACK at 08:36

## 2021-06-10 ASSESSMENT — ACTIVITIES OF DAILY LIVING (ADL)
ADLS_ACUITY_SCORE: 22

## 2021-06-10 ASSESSMENT — MIFFLIN-ST. JEOR: SCORE: 1498.13

## 2021-06-10 NOTE — PROGRESS NOTES
Care Management Follow Up    Length of Stay (days): 26    Expected Discharge Date: 06/11/21     Concerns to be Addressed: Discharge planning      Patient plan of care discussed at interdisciplinary rounds: Yes    Anticipated Discharge Disposition: LTAC     Additional Information:  Pt has been referred to LTAC and meets criteria.  Pt spouse preference is [facility].  Pt EVD removed yesterday, 6/9 and per discussion with the team, pt will be ready by tomorrow, 6/11 to transfer to LTAC.  RNCC visited pt spouse yesterday afternoon for support and to provide update about the discharge planning.  Pt spouse is in agreement with the plan.    Pt insurance requires prior Auth.  RNCC obtained prior auth from pt insurance.  Auth # [id number].  RNCC shared the auth number with Angie Del Rio.   Plan to transfer pt to [facility] tomorrow, 6/11 if remains stable and bed is available.  RNCC will cont to follow plan of care.      Madhu Sims RN, PHN, BSN  4A and 4E/ ICU  Care Coordinator  Phone: [phone]  Pager: [phone]

## 2021-06-10 NOTE — PROGRESS NOTES
[facility]  NeuroCritical Care Progress Note    Patient Name:  Dayron Neri  MRN:  [id number]    :  1950  Date of Service:  06/10/2021  Primary care provider:  No primary care provider on file.        24 HOUR EVENTS:  No events overnight. EVD removed yesterday. Exam stable. Possible discharge to LTAC tomorrow depending on bed availability.     ASSESSMENT/PLAN:  Dayron Neri is a 70 year old admitted on 5/15/2021 with subarachnoid hemorrhage (MF4, HH5) and brain compression with midline shift R>L 2/2 ruptured P-comm aneurysm. Now S/p right P-comm aneurysm clipping on 5/15. Hospital course c/b acute respiratory failure with concern for aspiration PNA, S/p re-intubation on , right frontal, left frontoparietal, and right superior parietal infarcts on CT head , and vasospasms S/p b/l ICA verapamil on .        NEURO:  Neuro:  #Subarachnoid hemorrhage (MF4, HH5), aneurysmal: 5/15/21  #Ruptured R PCOM aneurysm  Cerebral angiogram on 5/15 with confirmation of rupture for R craniotomy and clipping of PCOM aneurysm  - Neuro checks q2h (q4h at night)  - Nimodipine course completed   - Daily TCD checks completed  - HOB > 30 degrees     #Right frontal, left frontoparietal, and right superior parietal infarcts on CT head   #Bilateral Delayed Cerebral Ischemia, likely from vasospasm  Angio  with mild vasospasm, s/p b/l ICA verapamil  CTA  with diffuse b/I anterior and posterior circulation vasospasm     #IVH with subsequent hydrocephalus  #Brain compression with midline shift R>L  EVD placed 5/15, replaced  2/2 EVD malfunction, replaced again on  for occlusion   - EVD clamped, will be removed.  - AM CT showed stable vents        #Agitation  - Seroquel 12.5 mg BID PRN  - Delirium precautions        CARDIO:  ##HTN  ECHO completed on , EF 60-65%  - PTA Lisinopril 10 mg daily  - Hydralazine and Labetalol PRN  -SBP < 200        PULM:  #Aspiration  pneumonia  #Thick secretions  Extubated 6/3; re-intubated   - Trach   - Continue with pulmonary hygiene  - Duonebs q6h  - Guaifenesin q6h  - 3% saline nebs q6h  - Chest Physiotherapy q6h  - Continuous pulse ox monitoring  - Maintain O2 saturations > 92%        GI:  #Severe malnutrition in the context of acute on chronic illness  - Protein modular 1 Pkt  - Multivitamins with minerals  - Diet: TF @ goal (60)  - NG  - PEG   - Last BM--   Bowel regimen: Scheduled Senna-docusate and Miralax daily         RENAL:  #Hypokalemia  - Potassium chloride 20 mEq BID     #Urinary retention  - voiding  - Doxazosin 1 mg daily     - IV fluids: None  - BMP daily  - Electrolyte replacement protocol in place        ENDO:  #Hyperglycemia  # Insulin SS  - Follow glucose levels     HEME:  #Anemia  #Thrombocytosis  - Daily CBC        ID:  #afebrile  #Leukocytosis-10.4  #possible ventriculitis  #Pneumonia  -Daily CBC  -Sputum with possible ESBL-  Meropenem started 6/5 x 7 days  -follow cx  -previously on piptazo (- to )  -MRSA swab negative  -Sputum cx Pending     Checklist:  FEN: none, replacement, tube feeds  LDA: PIV, CVC, PEG, Trach   PPx:   - DVT: SCD's, heparin  - GI: Pepcid  Code: Full     Dispo: ICU        Patient discussed with Attending Dr. Casillas .    Yimi Montano DNP  Ascom: [id number]  06/10/2021      ______________________________    24 HOUR VITAL SIGNS SUMMARY:   Temperatures:  Current - Temp: 99.7  F (37.6  C); Max - Temp  Av.6  F (37  C)  Min: 97.6  F (36.4  C)  Max: 99.7  F (37.6  C)  Respiration range: Resp  Av.4  Min: 11  Max: 28  Pulse range: Pulse  Av  Min: 72  Max: 92  Blood pressure range: Systolic (24hrs), Av , Min:107 , Max:169   ; Diastolic (24hrs), Av, Min:75, Max:122    Pulse oximetry range: SpO2  Av.5 %  Min: 95 %  Max: 100 %    VENTILATOR SETTINGS:  Ventilation Mode: CPAP/PS  (Continuous positive airway pressure with Pressure Support)  FiO2 (%): 40 %  Rate Set  "(breaths/minute): 14 breaths/min  Tidal Volume Set (mL): 450 mL  PEEP (cm H2O): 8 cmH2O  Pressure Support (cm H2O): 7 cmH2O  Oxygen Concentration (%): 40 %  Peak Inspiratory Pressure (cm H2O) (Drager Marifer): 18  Resp: 28        Intake/Output Summary (Last 24 hours) at 6/10/2021 1108  Last data filed at 6/10/2021 0800  Gross per 24 hour   Intake 2072.5 ml   Output 2585 ml   Net -512.5 ml       BP - Mean:  [] 144    PHYSICAL EXAMINATION:   BP (!) 169/122   Pulse 92   Temp 99.7  F (37.6  C) (Oral)   Resp 28   Ht 1.803 m (5' 11\")   Wt 70.6 kg (155 lb 10.3 oz)   SpO2 97%   BMI 21.71 kg/m      General: Lying in bed, NAD  HEENT: Trach, EVD  Resp: Vent  Cardio: tele  Abdomen: PEG  Extremities: Warm and well perfused, peripheral pulses present, generalized edema  Skin: Not jaundiced, no rash, no ecchymoses     Neuro:  Pt awake and able to follow basic commands. Sensation intact, with grimaces to noxious stim, Moves all extremities. Right weaker than left. Pupils equal and symetric. +cough/gag    CURRENT MEDICATIONS:    [Held by provider] doxazosin  1 mg Oral or Feeding Tube Daily     famotidine  20 mg Oral BID     guaiFENesin  10 mL Oral or Feeding Tube Q6H     heparin ANTICOAGULANT  5,000 Units Subcutaneous Q8H     insulin aspart  1-12 Units Subcutaneous Q4H     ipratropium - albuterol 0.5 mg/2.5 mg/3 mL  3 mL Nebulization Q6H     lisinopril  10 mg Oral or Feeding Tube Daily     meropenem  2 g Intravenous Q8H     nystatin  500,000 Units Mouth/Throat 4x Daily     [Held by provider] polyethylene glycol  17 g Oral or Feeding Tube Daily     potassium & sodium phosphates  1 packet Oral or Feeding Tube BID     potassium chloride  20 mEq Oral or Feeding Tube BID     protein modular  1 packet Per Feeding Tube TID     senna-docusate  1 tablet Oral or Feeding Tube BID    Or     senna-docusate  2 tablet Oral or Feeding Tube BID     sodium chloride  3 mL Nebulization Q6H       PRN MEDICATIONS:  acetaminophen, bisacodyl, " hydrALAZINE, labetalol    INFUSIONS:      ALLERGIES:  No Known Allergies      LABS/STUDIES:  Recent Labs   Lab Test 06/10/21  0356 06/09/21  0340 06/08/21  0350    143 141   POTASSIUM 3.7 3.8 3.7   CHLORIDE 105 108 108   CO2 35* 32 32   ANIONGAP 2* 3 2*   * 117* 125*   BUN 23 24 28   CR 0.52* 0.49* 0.55*   RAGINI 9.3 9.3 9.3   WBC 9.4 10.4 16.6*   RBC 2.64* 2.69* 2.86*   HGB 8.1* 8.2* 8.7*    484* 528*       Recent Labs   Lab Test 05/15/21  2125 05/15/21  1222 05/15/21  0930   INR 1.20* 1.16* 1.07   PTT 30 29 27       Recent Labs   Lab 06/06/21  0701 06/06/21  0449 06/05/21  1009 06/05/21  0752   PH 7.39 7.49* 7.50* 7.47*   PCO2 51* 40 37 38   PO2 71* 161* 85 56*   HCO3 31* 31* 28 28   O2PER 60 15L PM 60 7L         I have personally reviewed all labs and imaging for this patient.

## 2021-06-10 NOTE — PROGRESS NOTES
CLINICAL NUTRITION SERVICES - REASSESSMENT NOTE     Nutrition Prescription    RECOMMENDATIONS FOR MDs/PROVIDERS TO ORDER:  - Total daily fluids/adjustments per MD   - Minimize disruptions to EN infusions     Malnutrition Status:    - Severe malnutrition in the context of acute on chronic illness     Recommendations already ordered by Registered Dietitian (RD):  - Increase current regimen: Osmolite 1.5 Cristi @ goal of 65ml/hr  (1560ml/day)  will provide: 2340 kcals, 97 g PRO, 1188 ml free H20, 317 g CHO, and 0 g fiber daily + Additional 3 packets Prosource = 130 gm PRO (1.9 gm/kg) and 2460 kcals (36 kcals)  - Elevated HOB    Future/Additional Recommendations:  - Ongoing EN tolerance via current access/PEG  - Weight trends      EVALUATION OF THE PROGRESS TOWARD GOALS   Diet: NPO  Nutrition Support: Osmolite 1.5 Cristi @ goal of  60ml/hr (1440ml/day) will provide: 2160 kcals (31+ kcals/kg), 90 g PRO (1.3+ gm/kg), 1097 ml free H20, 293 g CHO, and 0 g fiber daily + Additional 3 packets Prosource = 123 gm PRO (1.8 gm/kg) and 2240 kcals (32 kcals)  Intake: 7-day averages =  1856 kcals (27 kcals/kg) and 107 gm PRO (1.6 gm/kg)     NEW FINDINGS   Nutrition/GI: Pt continues on EN via current access/PEG and tolerating at goal. Pt receiving adequate infusions over the last week per flowsheets/averages. LBM 6/9. Pt continues to appear thin. Per wife, pt is thin at baseline and it's difficult for him to gain weight.      Neuro: HH5mF4 Subarachnoid hemorrhage (5/19)   - Secondary to right P-comm aneurysm, secured 15 May by open clipping; ischemic infarct 2/2 vasospasm; Hydrocephalus - EVD clamped.      Resp:  Trach in place, weaning as able.      Renal: Fluid up since admit. - FWF 50ml q2h.  Na goal normonatremia and euvolemia     Labs/meds:  Creatinine: 0.52 (L); glucose trends: 134, 141, 153. Insulin; Lasix; Certavite; Miralax; Senokot; Neutra-phos; Prosource.     Weights: Overall weight has trended up from admit: 69 kg --> 71 kg.  Fluctuations likely r/t fluid status. Continue current dosing weight/driest, admit weight.     MALNUTRITION  % Intake: </= 50% for >/= 5 days (severe) - improved   % Weight Loss: None noted  Subcutaneous Fat Loss: Facial mild region: moderate and Upper arm: moderate  Muscle Loss: Temporal:  moderate, Thoracic region (clavicle, acromium bone, deltoid, trapezius, pectoral): severe, Upper arm (bicep, tricep):  moderate and Upper leg (quadricep, hamstring): moderate; calf: severe  Fluid Accumulation/Edema: None noted  Malnutrition Diagnosis: Severe malnutrition in the context of acute on chronic illness     Previous Goals   Total avg nutritional intake to meet a minimum of 25 kcal/kg and 1.5 g PRO/kg daily (per dosing wt 69 kg).  Evaluation: Met    Previous Nutrition Diagnosis  Inadequate energy intake related to disruptions to EN infusions as evidenced by pt not meeting goal energy provisions with 7-day averages meeting 24 kcals/kg dosing weight.    Evaluation: Improving    CURRENT NUTRITION DIAGNOSIS  Inadequate oral intake related to inability to take oral PO/neuro status as evidenced by pt NPO and requires long-term FT/TFs to meet 100% nutrition needs.       INTERVENTIONS  Implementation  Enteral Nutrition - continue EN and increase slightly to account for and EN holds and pt body habitus.     Goals  Total avg nutritional intake to meet a minimum of 30 kcal/kg and 1.5 g PRO/kg daily (per dosing wt 69 kg).    Monitoring/Evaluation  Progress toward goals will be monitored and evaluated per protocol.     Veronica Ramirez RD, RENATE, [facility]  Neuro ICU  Pager: [phone]

## 2021-06-10 NOTE — PLAN OF CARE
Major Shift Events:    Neuro- q4hr neuros overnight to promotte sleep. PERRLA 4-5mm, occasionally tracks. Withdraws in BUE and squeezed fingers x1. Wiggles left toes to command. Moves all extrem spont.   Resp- tolerates CMV settings, large amount of cloudy/tan secretions, requires frequent trach cares  CV- hemodynamically stable, NSR w/ occasional PVCs  GI- TF at goal, rectal pouch intact w/ minimal output  - primo fit in place, adequate UO    Plan: Monitor neuros, trach cares, PST, continue POC    For vital signs and complete assessments, please see documentation flowsheets.

## 2021-06-10 NOTE — DISCHARGE SUMMARY
[facility] Discharge Summary and Instructions    Dayron Neri MRN# [id number]   Age: 70 year old YOB: 1950     Date of Admission:  5/15/2021  Date of Discharge::  06/11/2021  Admitting Physician:  Jamin Martinez MD  Discharge Physician:  Jamin Martinez MD          Admission Diagnoses:   Subarachnoid hemorrhage due to ruptured aneurysm (H) [I60.8]          Discharge Diagnosis:     Subarachnoid hemorrhage due to ruptured aneurysm (H) [I60.8]    In addition to the assessment of diagnoses detailed above, this 70 year old male  patient  has the following conditions contributing to the complexity of their medical care:    Hypokalemia   Severe malnutrition in the context of acute on chronic illness  Respiratory failure  Pneumonia          Procedures:   05/15/2021  Left External Ventricular Drain placement,Arterial line and central line placement    05/15/2021  Diagnostic cervicocerebral angiography  Findings:  Confirmation of rutpured pcomm aneurysm    05/15/2021  1.  Pterional craniotomy.  2.  Microscopic dissection for clipping of aneurysm.  3.  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  4.  Evacuation of intracerebral hemorrhage.    05/28/2021  Cervicocerebral angiography w/ Intra-arterial vasospasm treatment with medication or angioplasty  Findings:  Mild vasospasm Left A1 and right A1. 20 mg verapamil injected in both ICA's    06/04/2021 and 06/06/2021  Left sided External ventricular drain replacement.    06/07/2021  PEG Tube placement  Tracheostomy    06/09/2021  Removal of External Ventricular Drain           Brief History of Illness:   Dayron Neri is a 70 year old male with history of hypertension transferred from OSH after found to have aSAH with AMS. Per report, at 0800 today, the patient's wife heard a thud and found the patient unresponsive in the shower.  After three minutes of unsuccessfully trying to wake him up she called 911. At OSH, he was found to have  SBP of 200s and fixed and dialated right pupil. He was given mannitol and hypertonics and his pupil became equal. He was subsequently transferred from OSH for further evaluation.           Hospital Course:   Patient underwent above-mentioned procedures on the dates mentioned above. After the procedure the patient was transferred to the neurosurgical ICU. Hospital course is mentioned below:    Brief hospital course:  5/15/21: admit to 4A, a-line placed,  EVD, central line, angiogram, OR for open clipping  5/16: Extubated, CTA/CTP obtained, TCDs WNL  5/17: decreased plasmalyte to 50, feeding tube repositioned, BS dropped to 51 with D50 given; WBC increasing, follow fever curve; low threshold to panculture  5/18: WENDY drain removed, EVD dropped to 0; SBP liberalized to 200; TCDs mildly elevated, fluid resuscitation for euvolemia  5/19: Re-intubated; CTA/CTP was unremarkable; continuing to follow closely  5/20: Switched to precedex, exam more reassuring; unasyn started for PNA, continuing to watch sodium  5/21: FWF increased due to rising Na  5/22: HAKAN, TCDs normal, following commands  5/25: Sputum growing pseudomonas, on Zosyn, elevated TCD, also had respiratory decline, unable to get angiogram  5/26:  increased PEEP to 8, saturations better. Raise EVD to 10.  CT PE negative  5/27: on sedation, saturation better  5/28: persistently elevated TCD, angiogram for angioplasty, CTH showed bilateral BAYLEE infarct, MRI completed  5/29: persistent TCD and CTA bilateral BAYLEE vasospasm, milirinone started  5/30: TCDs normal, EET re-changed, wife okay for trach per neurocrit  5/31: CTA/CTP performed for new baseline, exam stable, TCDs overall stable; plan to wean milrinone soon; staples removed  6/1:  Right BAYLEE elevated, taken to angio which showed no evidence of vasospasm  6/2: Milrinone decreased, Tylenol for fevers, trach added on for Monday  6/3:  Extubated    6/4: EVD replaced, milrinone continuing to be weaned  6/5: EVD sluggish  again, EVD raised to 20 tenuous resp status with high flow started, poss ESBL in sputum, broadened to meropenem and vanc  6/6: reintubated due to drop in sats, PEG consult in   6/7:  Tracheostomy  and PEG placement   6/9:  EVD removed      Examination at discharge:    Gen: trach'd  Incision: c/d/i  Trached  Open eyes to voice  Wiggles left toes to command  Moves all extremities spontaneously    The patient was deemed medically stable to be transferred to [facility] on 6/11/2021.         Discharge Medications:     Current Discharge Medication List      START taking these medications    Details   acetaminophen (TYLENOL) 325 MG tablet 2 tablets (650 mg) by Oral or Feeding Tube route every 4 hours as needed for mild pain or fever    Comments: Pain/fever  Associated Diagnoses: Ventilator dependence (H); Subarachnoid hemorrhage due to ruptured aneurysm (H)      bisacodyl (DULCOLAX) 10 MG suppository Place 1 suppository (10 mg) rectally daily as needed for constipation    Comments: constipation  Associated Diagnoses: Ventilator dependence (H); Subarachnoid hemorrhage due to ruptured aneurysm (H)      famotidine (PEPCID) 20 MG tablet Take 1 tablet (20 mg) by mouth 2 times daily    Comments: Ulcer prophylaxis  Associated Diagnoses: Ventilator dependence (H); Subarachnoid hemorrhage due to ruptured aneurysm (H)      Heparin Sodium, Porcine, (HEPARIN ANTICOAGULANT) 5000 UNIT/0.5ML injection Inject 0.5 mLs (5,000 Units) Subcutaneous every 8 hours    Comments: DVT prophylaxis  Associated Diagnoses: Ventilator dependence (H); Subarachnoid hemorrhage due to ruptured aneurysm (H)      insulin aspart (NOVOLOG PEN) 100 UNIT/ML pen Inject 1-12 Units Subcutaneous every 4 hours    Comments: Sliding scale insulin for nikunj glucose  Associated Diagnoses: Ventilator dependence (H); Subarachnoid hemorrhage due to ruptured aneurysm (H)      ipratropium - albuterol 0.5 mg/2.5 mg/3 mL (DUONEB) 0.5-2.5 (3) MG/3ML neb solution Take 1 vial (3 mLs) by  nebulization every 6 hours    Comments: Wheezing/ Shortness of breath  Associated Diagnoses: Ventilator dependence (H); Subarachnoid hemorrhage due to ruptured aneurysm (H)      lisinopril (ZESTRIL) 10 MG tablet 1 tablet (10 mg) by Oral or Feeding Tube route daily    Comments: hypertension  Associated Diagnoses: Ventilator dependence (H); Subarachnoid hemorrhage due to ruptured aneurysm (H)      meropenem 2 g Inject 2 g into the vein every 8 hours for 8 days    Comments: pneumonia  Associated Diagnoses: Ventilator dependence (H); Subarachnoid hemorrhage due to ruptured aneurysm (H)      nystatin (MYCOSTATIN) 232684 UNIT/ML suspension Take 5 mLs (500,000 Units) by mouth 4 times daily    Comments: Oropharyngeal candidiasis  Associated Diagnoses: Ventilator dependence (H); Subarachnoid hemorrhage due to ruptured aneurysm (H)      potassium & sodium phosphates (NEUTRA-PHOS) 280-160-250 MG Packet 1 packet by Oral or Feeding Tube route 2 times daily    Comments: Low phosphorus  Associated Diagnoses: Ventilator dependence (H); Subarachnoid hemorrhage due to ruptured aneurysm (H)      potassium chloride (KLOR-CON) 20 MEQ packet 20 mEq by Oral or Feeding Tube route 2 times daily    Comments: hypokalemia  Associated Diagnoses: Ventilator dependence (H); Subarachnoid hemorrhage due to ruptured aneurysm (H)      senna-docusate (SENOKOT-S/PERICOLACE) 8.6-50 MG tablet 2 tablets by Oral or Feeding Tube route 2 times daily    Comments: constipation  Associated Diagnoses: Ventilator dependence (H); Subarachnoid hemorrhage due to ruptured aneurysm (H)         CONTINUE these medications which have NOT CHANGED    Details   protein modular (PROSOURCE TF) LIQD 1 packet by Per Feeding Tube route 3 times daily    Comments: supplement  Associated Diagnoses: Ventilator dependence (H); Subarachnoid hemorrhage due to ruptured aneurysm (H)                Discharge Instructions and Follow-Up:     Discharge diet: Follow this diet upon discharge:  Orders Placed This Encounter Adult Formula Drip Feeding: Continuous Osmolite 1.5; Gastrostomy; Goal Rate: 65; mL/hr; Medication - Feeding Tube Flush Frequency: At least 15-30 mL water before and after medication administration and with tube clogging; Amount to Send (Nutrition... Adult Formula Tube Feeding     Discharge activity: As tolerated with nursing assistance.      Discharge follow-up: You should follow up with Dr. Martinez in Neurosurgery clinic in 3 months with repeat CTA of head and neck for post-operative follow up. You should call [phone] or [phone] if you have not heard from the hospital within 7 days of discharge regarding your follow-up appointment.     Wound care: Ok to remove nylon sutures from the scalp after 1 week on 06/17/2021.  Tracheostomy, PEG site and Gutierrez care.     Please call if you have:  1. increased pain, redness, drainage, swelling at your incision  2. fevers > 101.5 F degrees  3. with any questions or concerns.  You may reach the Neurosurgery clinic at [phone] during regular work hours. ER at [phone].    and ask for the Neurosurgery Resident on call at [phone], during off hours or weekends.         Discharge Disposition:     Discharged to long-term care facility        [employer]

## 2021-06-10 NOTE — PROGRESS NOTES
Patient became apneic on PS trial and switched to apnea ventilation. Switched back to AC. No acute issues. Will continue to monitor.

## 2021-06-10 NOTE — PROGRESS NOTES
[facility], [place]   06/10/2021  Neurosurgery Progress Note:    Assessment:  Dayron Neri is a 70 year old male presented with AMS found to have aSAH with right supraclinoid aneurysm. mFS4 and HH5. GCS 5. S/p EVD placement, DCA,s/p aneurysm clipping on 5/16. EVD replaced on 6/4, and again on 6/6 due to EVD clogging. Had to be reintubated on 6/6 due to respiratory failure.     The following conditions are also present, complicating the patient's current management, as described in the Assessment and Plan:   mechanical ventilation on day of admission, intracerebral hematoma, brain compression, acute respiratory failure, coma, based on current GCS of 5 and cerebral aneurysm rupture with hunt sims scale 5 modified lowery 4     Hypokalemia   Severe malnutrition in the context of acute on chronic illness  Respiratory failure  Pneumonia    Plan:  - Serial neuro exams  - SBP<200  - follow-up clx  -- Meropenem/Vancomycin for PNA  - Continuous cardiac monitoring while in ICU   - Glucose < 180  - Continue glucose checks  - Platelets > 100,000  - INR < 1.5  - Hemoglobin > 8  - DVT: SCDs while in bed  - Disposition: 4A  - PT/OT consulted    -----------------------------------  Myranda Woodson MD  Neurosurgery Resident, PGY-2    Please contact neurosurgery resident on call with questions.    Dial * * *[phone], enter [phone] when prompted.   -----------------------------------    Interval History: EVD removed    Objective:   Temp:  [97.6  F (36.4  C)-98.9  F (37.2  C)] 98.2  F (36.8  C)  Pulse:  [72-89] 74  Resp:  [11-21] 15  BP: (107-148)/() 137/88  FiO2 (%):  [30 %-40 %] 40 %  SpO2:  [95 %-100 %] 99 %  I/O last 3 completed shifts:  In: 2560.5 [I.V.:650.5; NG/GT:890]  Out: 2585 [Urine:2500; Stool:85]    Gen: trached  Incision: c/d/i  Trached  Open eyes to voice  Wiggles left toes to command  Moves all extremities spontaneously    LABS:  Recent Labs   Lab 06/10/21  0356 06/09/21  0340  06/08/21  0350    143 141   POTASSIUM 3.7 3.8 3.7   CHLORIDE 105 108 108   CO2 35* 32 32   ANIONGAP 2* 3 2*   * 117* 125*   BUN 23 24 28   CR 0.52* 0.49* 0.55*   RAGINI 9.3 9.3 9.3       Recent Labs   Lab 06/10/21  0356   WBC 9.4   RBC 2.64*   HGB 8.1*   HCT 26.5*      MCH 30.7   MCHC 30.6*   RDW 13.6          IMAGING:  No results found for this or any previous visit (from the past 24 hour(s)).

## 2021-06-10 NOTE — PLAN OF CARE
Pt remain alert, trach and mechanically ventilated. No change of condition noted during the shift; afebrile tolerated all scheduled medications and care with no distress noted. Spouse at bedside. Reposition very frequently and as needed. Remain on continue care monitoring.

## 2021-06-11 ENCOUNTER — RECORDS - HEALTHEAST (OUTPATIENT)
Dept: RADIOLOGY | Facility: CLINIC | Age: 71
End: 2021-06-11

## 2021-06-11 ENCOUNTER — HOSPITAL ENCOUNTER (INPATIENT)
Dept: MEDSURG UNIT | Facility: CLINIC | Age: 71
LOS: 108 days | Discharge: NURSING, REHAB OR LONG-TERM CARE | DRG: 207 | End: 2021-09-27
Attending: HOSPITALIST | Admitting: INTERNAL MEDICINE
Payer: COMMERCIAL

## 2021-06-11 ENCOUNTER — APPOINTMENT (OUTPATIENT)
Dept: CT IMAGING | Facility: CLINIC | Age: 71
DRG: 003 | End: 2021-06-11
Attending: STUDENT IN AN ORGANIZED HEALTH CARE EDUCATION/TRAINING PROGRAM
Payer: COMMERCIAL

## 2021-06-11 VITALS
SYSTOLIC BLOOD PRESSURE: 128 MMHG | RESPIRATION RATE: 15 BRPM | TEMPERATURE: 98.5 F | OXYGEN SATURATION: 99 % | HEIGHT: 71 IN | HEART RATE: 77 BPM | DIASTOLIC BLOOD PRESSURE: 91 MMHG | BODY MASS INDEX: 21.2 KG/M2 | WEIGHT: 151.46 LBS

## 2021-06-11 DIAGNOSIS — I10 ESSENTIAL HYPERTENSION: ICD-10-CM

## 2021-06-11 DIAGNOSIS — I60.8 SUBARACHNOID HEMORRHAGE DUE TO RUPTURED ANEURYSM (H): Primary | ICD-10-CM

## 2021-06-11 DIAGNOSIS — Z93.0 TRACHEOSTOMY IN PLACE (H): ICD-10-CM

## 2021-06-11 DIAGNOSIS — I10 BENIGN ESSENTIAL HYPERTENSION: ICD-10-CM

## 2021-06-11 DIAGNOSIS — R21 RASH AND NONSPECIFIC SKIN ERUPTION: ICD-10-CM

## 2021-06-11 DIAGNOSIS — Z78.9 ON TUBE FEEDING DIET: ICD-10-CM

## 2021-06-11 LAB
ABO + RH BLD: NORMAL
ABO + RH BLD: NORMAL
ANION GAP SERPL CALCULATED.3IONS-SCNC: 2 MMOL/L (ref 3–14)
BACTERIA SPEC CULT: NORMAL
BLD GP AB SCN SERPL QL: NORMAL
BLOOD BANK CMNT PATIENT-IMP: NORMAL
BUN SERPL-MCNC: 25 MG/DL (ref 7–30)
CALCIUM SERPL-MCNC: 9.6 MG/DL (ref 8.5–10.1)
CHLORIDE SERPL-SCNC: 106 MMOL/L (ref 94–109)
CO2 SERPL-SCNC: 36 MMOL/L (ref 20–32)
CREAT SERPL-MCNC: 0.57 MG/DL (ref 0.66–1.25)
ERYTHROCYTE [DISTWIDTH] IN BLOOD BY AUTOMATED COUNT: 13.7 % (ref 10–15)
GFR SERPL CREATININE-BSD FRML MDRD: >90 ML/MIN/{1.73_M2}
GLUCOSE BLDC GLUCOMTR-MCNC: 114 MG/DL (ref 70–99)
GLUCOSE BLDC GLUCOMTR-MCNC: 116 MG/DL (ref 70–99)
GLUCOSE BLDC GLUCOMTR-MCNC: 125 MG/DL (ref 70–99)
GLUCOSE BLDC GLUCOMTR-MCNC: 129 MG/DL (ref 70–99)
GLUCOSE BLDC GLUCOMTR-MCNC: 147 MG/DL (ref 70–99)
GLUCOSE BLDC GLUCOMTR-MCNC: 152 MG/DL (ref 70–99)
GLUCOSE BLDC GLUCOMTR-MCNC: 99 MG/DL (ref 70–139)
GLUCOSE SERPL-MCNC: 142 MG/DL (ref 70–99)
HCT VFR BLD AUTO: 26.8 % (ref 40–53)
HGB BLD-MCNC: 8.2 G/DL (ref 13.3–17.7)
Lab: NORMAL
MAGNESIUM SERPL-MCNC: 2.5 MG/DL (ref 1.6–2.3)
MCH RBC QN AUTO: 30.5 PG (ref 26.5–33)
MCHC RBC AUTO-ENTMCNC: 30.6 G/DL (ref 31.5–36.5)
MCV RBC AUTO: 100 FL (ref 78–100)
PHOSPHATE SERPL-MCNC: 2.9 MG/DL (ref 2.5–4.5)
PLATELET # BLD AUTO: 397 10E9/L (ref 150–450)
POTASSIUM SERPL-SCNC: 3.8 MMOL/L (ref 3.4–5.3)
RBC # BLD AUTO: 2.69 10E12/L (ref 4.4–5.9)
SARS-COV-2 PCR RESULT-HE - HISTORICAL: NEGATIVE
SODIUM SERPL-SCNC: 143 MMOL/L (ref 133–144)
SPECIMEN EXP DATE BLD: NORMAL
SPECIMEN SOURCE: NORMAL
WBC # BLD AUTO: 8.2 10E9/L (ref 4–11)

## 2021-06-11 PROCEDURE — 70450 CT HEAD/BRAIN W/O DYE: CPT | Mod: 26 | Performed by: RADIOLOGY

## 2021-06-11 PROCEDURE — 80048 BASIC METABOLIC PNL TOTAL CA: CPT | Performed by: STUDENT IN AN ORGANIZED HEALTH CARE EDUCATION/TRAINING PROGRAM

## 2021-06-11 PROCEDURE — 94003 VENT MGMT INPAT SUBQ DAY: CPT

## 2021-06-11 PROCEDURE — 83735 ASSAY OF MAGNESIUM: CPT | Performed by: STUDENT IN AN ORGANIZED HEALTH CARE EDUCATION/TRAINING PROGRAM

## 2021-06-11 PROCEDURE — 250N000009 HC RX 250: Performed by: PSYCHIATRY & NEUROLOGY

## 2021-06-11 PROCEDURE — 999N000185 HC STATISTIC TRANSPORT TIME EA 15 MIN

## 2021-06-11 PROCEDURE — 999N000157 HC STATISTIC RCP TIME EA 10 MIN

## 2021-06-11 PROCEDURE — 94640 AIRWAY INHALATION TREATMENT: CPT

## 2021-06-11 PROCEDURE — 999N001212 HC REV CODE 9999 CHARGE CONVERSION OPNP

## 2021-06-11 PROCEDURE — 86901 BLOOD TYPING SEROLOGIC RH(D): CPT | Performed by: NEUROLOGICAL SURGERY

## 2021-06-11 PROCEDURE — 250N000011 HC RX IP 250 OP 636: Performed by: NEUROLOGICAL SURGERY

## 2021-06-11 PROCEDURE — 87081 CULTURE SCREEN ONLY: CPT

## 2021-06-11 PROCEDURE — 250N000011 HC RX IP 250 OP 636: Performed by: STUDENT IN AN ORGANIZED HEALTH CARE EDUCATION/TRAINING PROGRAM

## 2021-06-11 PROCEDURE — 85027 COMPLETE CBC AUTOMATED: CPT | Performed by: STUDENT IN AN ORGANIZED HEALTH CARE EDUCATION/TRAINING PROGRAM

## 2021-06-11 PROCEDURE — 258N000003 HC RX IP 258 OP 636: Performed by: NURSE PRACTITIONER

## 2021-06-11 PROCEDURE — 250N000013 HC RX MED GY IP 250 OP 250 PS 637: Performed by: NEUROLOGICAL SURGERY

## 2021-06-11 PROCEDURE — 94664 DEMO&/EVAL PT USE INHALER: CPT

## 2021-06-11 PROCEDURE — 36600 WITHDRAWAL OF ARTERIAL BLOOD: CPT

## 2021-06-11 PROCEDURE — 70450 CT HEAD/BRAIN W/O DYE: CPT

## 2021-06-11 PROCEDURE — 86850 RBC ANTIBODY SCREEN: CPT | Performed by: NEUROLOGICAL SURGERY

## 2021-06-11 PROCEDURE — 999N001017 HC STATISTIC GLUCOSE BY METER IP

## 2021-06-11 PROCEDURE — 250N000013 HC RX MED GY IP 250 OP 250 PS 637: Performed by: NURSE PRACTITIONER

## 2021-06-11 PROCEDURE — 250N000013 HC RX MED GY IP 250 OP 250 PS 637: Performed by: STUDENT IN AN ORGANIZED HEALTH CARE EDUCATION/TRAINING PROGRAM

## 2021-06-11 PROCEDURE — 84100 ASSAY OF PHOSPHORUS: CPT | Performed by: STUDENT IN AN ORGANIZED HEALTH CARE EDUCATION/TRAINING PROGRAM

## 2021-06-11 PROCEDURE — 5A1955Z RESPIRATORY VENTILATION, GREATER THAN 96 CONSECUTIVE HOURS: ICD-10-PCS | Performed by: INTERNAL MEDICINE

## 2021-06-11 PROCEDURE — 999N000111 HC STATISTIC OT IP EVAL DEFER

## 2021-06-11 PROCEDURE — 93005 ELECTROCARDIOGRAM TRACING: CPT

## 2021-06-11 PROCEDURE — 94002 VENT MGMT INPAT INIT DAY: CPT

## 2021-06-11 PROCEDURE — 250N000011 HC RX IP 250 OP 636: Performed by: NURSE PRACTITIONER

## 2021-06-11 PROCEDURE — 250N000009 HC RX 250: Performed by: STUDENT IN AN ORGANIZED HEALTH CARE EDUCATION/TRAINING PROGRAM

## 2021-06-11 PROCEDURE — 94668 MNPJ CHEST WALL SBSQ: CPT

## 2021-06-11 PROCEDURE — 86900 BLOOD TYPING SEROLOGIC ABO: CPT | Performed by: NEUROLOGICAL SURGERY

## 2021-06-11 RX ORDER — POTASSIUM CHLORIDE 29.8 MG/ML
20 INJECTION INTRAVENOUS ONCE
Status: COMPLETED | OUTPATIENT
Start: 2021-06-11 | End: 2021-06-11

## 2021-06-11 RX ADMIN — SODIUM CHLORIDE SOLN NEBU 3% 3 ML: 3 NEBU SOLN at 03:49

## 2021-06-11 RX ADMIN — INSULIN ASPART 1 UNITS: 100 INJECTION, SOLUTION INTRAVENOUS; SUBCUTANEOUS at 08:44

## 2021-06-11 RX ADMIN — HEPARIN SODIUM 5000 UNITS: 5000 INJECTION, SOLUTION INTRAVENOUS; SUBCUTANEOUS at 08:44

## 2021-06-11 RX ADMIN — NYSTATIN 500000 UNITS: 500000 SUSPENSION ORAL at 13:01

## 2021-06-11 RX ADMIN — Medication 1 PACKET: at 08:39

## 2021-06-11 RX ADMIN — HEPARIN SODIUM 5000 UNITS: 5000 INJECTION, SOLUTION INTRAVENOUS; SUBCUTANEOUS at 00:04

## 2021-06-11 RX ADMIN — POTASSIUM CHLORIDE 20 MEQ: 1.5 POWDER, FOR SOLUTION ORAL at 08:38

## 2021-06-11 RX ADMIN — MEROPENEM 2 G: 1 INJECTION, POWDER, FOR SOLUTION INTRAVENOUS at 01:45

## 2021-06-11 RX ADMIN — NYSTATIN 500000 UNITS: 500000 SUSPENSION ORAL at 08:38

## 2021-06-11 RX ADMIN — DOCUSATE SODIUM 50 MG AND SENNOSIDES 8.6 MG 1 TABLET: 8.6; 5 TABLET, FILM COATED ORAL at 08:39

## 2021-06-11 RX ADMIN — POTASSIUM CHLORIDE 20 MEQ: 29.8 INJECTION, SOLUTION INTRAVENOUS at 05:08

## 2021-06-11 RX ADMIN — SODIUM CHLORIDE SOLN NEBU 3% 3 ML: 3 NEBU SOLN at 07:24

## 2021-06-11 RX ADMIN — HEPARIN SODIUM 5000 UNITS: 5000 INJECTION, SOLUTION INTRAVENOUS; SUBCUTANEOUS at 15:59

## 2021-06-11 RX ADMIN — IPRATROPIUM BROMIDE AND ALBUTEROL SULFATE 3 ML: 2.5; .5 SOLUTION RESPIRATORY (INHALATION) at 07:24

## 2021-06-11 RX ADMIN — MEROPENEM 2 G: 1 INJECTION, POWDER, FOR SOLUTION INTRAVENOUS at 10:20

## 2021-06-11 RX ADMIN — IPRATROPIUM BROMIDE AND ALBUTEROL SULFATE 3 ML: 2.5; .5 SOLUTION RESPIRATORY (INHALATION) at 03:49

## 2021-06-11 RX ADMIN — LISINOPRIL 10 MG: 10 TABLET ORAL at 08:39

## 2021-06-11 RX ADMIN — FAMOTIDINE 20 MG: 20 TABLET ORAL at 08:39

## 2021-06-11 RX ADMIN — INSULIN ASPART 1 UNITS: 100 INJECTION, SOLUTION INTRAVENOUS; SUBCUTANEOUS at 04:46

## 2021-06-11 ASSESSMENT — ACTIVITIES OF DAILY LIVING (ADL)
ADLS_ACUITY_SCORE: 24

## 2021-06-11 ASSESSMENT — MIFFLIN-ST. JEOR
SCORE: 1472.07
SCORE: 1479.13

## 2021-06-11 NOTE — PLAN OF CARE
Major Shift Events:  Patient intermittently following commands. Strength > R side than left. Can wiggle toes on L side to command, but not on right. HR NSR. BPs stable. Tolerated PST for about three hours. Size 6 trach. Continues to have copious secretions from MD katie aware, no new orders received. Primofit intact and draining adequate amount of urine. Rectal tube patent. Up to chair for approx 2 hours and tolerated well.       Plan: LTACH  For vital signs and complete assessments, please see documentation flowsheets.

## 2021-06-11 NOTE — PROGRESS NOTES
Discharged to: (place) at (time): [facility]  @ 1600  Belongings: Personal items sent with patient.   AVS (After Visit Summary) discussed with: Patient and Patient's wife, Susy

## 2021-06-11 NOTE — PLAN OF CARE
Occupational Therapy Discharge Summary    Reason for therapy discharge:    Discharged to LTACH    Progress towards therapy goal(s). See goals on Care Plan in [facility] electronic health record for goal details.  Goals partially met.  Barriers to achieving goals:   discharge from facility.    Therapy recommendation(s):    Continued therapy is recommended.  Rationale/Recommendations:  to progress ADL IND.

## 2021-06-11 NOTE — PROGRESS NOTES
[facility] (unit 400)    Dayron Neri has been accepted for admission to [facility]  today.       Ride: 1:00 pm via [facility]    RN to RN report: Please call  Unit 4[address] at [phone] for nurse to nurse report.before the pt discharges.     Accepting MD: Dr. Sheri Beltre. Please contact Dr. Beltre at cell phone [phone] for provider to provider report before the pt discharges.    Paperwork needed prior to discharge: discharge summary and discharge orders. An imaging CD is not needed.     Transfer packet: Will need only the discharge summary, discharge orders and MAR.       Angie Baez RN  LTACH Referral Specialist    [address] [address]  [email or web address]    [email or web address]  Office: [phone]  Fax: [phone]     CONFIDENTIAL Protected under [place] Statute  145.61 et seq

## 2021-06-11 NOTE — PLAN OF CARE
Major Shift Events: neuro status unchanged. PERRLA 4-5mm, occasionally tracks. Squeezes hands to command rarely. Wiggles toes on command occasionally. Moves all extremities spontaneously. Trached. Malcolm #6. CMV: 40%, 450,14, 8. Large/ copious amount of tan thick secretions. Frequent trach cares. SR w/ rare PACs. SBP goal <200. TF @ goal through PEG, standard FWF. Rectal pouch in place. External urinary cath in place w/ good UO. K replaced per protocol. TKO running. CT scan completed.    Plan: PST today. Pt to transfer to [facility] today. For vital signs and complete assessments, please see documentation flowsheets.

## 2021-06-11 NOTE — PROGRESS NOTES
Care Management Discharge Note    Discharge Date: 06/11/21       Discharge Disposition: LTAC, [facility].    Discharge Services: Vent weaning     Discharge Transportation: stretcher transport with [facility]    Private pay costs discussed: Not applicable    Education Provided on the Discharge Plan: Yes   Persons Notified of Discharge Plans: pt spouse, Susy, Neuro ICU, Neurosurgery, bedside RN and charge nurse.    Patient/Family in Agreement with the Plan:  Yes    Handoff Referral Completed: No,  No PCP on file.    Additional Information:  Team reported pt is medically ready for transfer to LTAC.  Angie Del Rio reported they do have bed for  today and transport have been arranged for 13:00  time.  RNCC contacted pt spouse, Susy # [phone] and provided update about the discharge plan and  time.  Susy agreed with the plan.   time have been shared with the team and accepting provider number and RN number have been shared ( see Angie Del Rio's note for Name of the provider and contact numbers).  Ambulance PCS form have been completed.      Madhu Sims RN, PHN, BSN  4A and 4E/ ICU  Care Coordinator  Phone: [phone]  Pager: [phone]

## 2021-06-11 NOTE — PLAN OF CARE
Physical Therapy Discharge Summary    Reason for therapy discharge:    Discharged to LTACH    Progress towards therapy goal(s). See goals on Care Plan in [facility] electronic health record for goal details.  Goals not met.  Barriers to achieving goals:   discharge from facility.    Therapy recommendation(s):    Continued therapy is recommended.  Rationale/Recommendations:  increase safety and independence with functional mobility.

## 2021-06-12 LAB
ANION GAP SERPL CALCULATED.3IONS-SCNC: 9 MMOL/L (ref 5–18)
BACTERIA SPEC CULT: ABNORMAL
BACTERIA SPEC CULT: ABNORMAL
BACTERIA SPEC CULT: NORMAL
BUN SERPL-MCNC: 27 MG/DL (ref 8–22)
CALCIUM SERPL-MCNC: 9.4 MG/DL (ref 8.5–10.5)
CHLORIDE BLD-SCNC: 105 MMOL/L (ref 98–107)
CO2 SERPL-SCNC: 31 MMOL/L (ref 22–31)
CREAT SERPL-MCNC: 0.7 MG/DL (ref 0.7–1.3)
ERYTHROCYTE [DISTWIDTH] IN BLOOD BY AUTOMATED COUNT: 13.3 % (ref 11–14.5)
GFR SERPL CREATININE-BSD FRML MDRD: >60 ML/MIN/1.73M2
GLUCOSE BLD-MCNC: 146 MG/DL (ref 70–125)
HCT VFR BLD AUTO: 28 % (ref 40–54)
HGB BLD-MCNC: 8.8 G/DL (ref 14–18)
Lab: ABNORMAL
MCH RBC QN AUTO: 30.8 PG (ref 27–34)
MCHC RBC AUTO-ENTMCNC: 31.4 G/DL (ref 32–36)
MCV RBC AUTO: 98 FL (ref 80–100)
PLATELET # BLD AUTO: 391 THOU/UL (ref 140–440)
PMV BLD AUTO: 9 FL (ref 8.5–12.5)
POTASSIUM BLD-SCNC: 3.9 MMOL/L (ref 3.5–5)
RBC # BLD AUTO: 2.86 MILL/UL (ref 4.4–6.2)
SODIUM SERPL-SCNC: 145 MMOL/L (ref 136–145)
SPECIMEN SOURCE: ABNORMAL
SPECIMEN SOURCE: NORMAL
WBC: 8.3 THOU/UL (ref 4–11)

## 2021-06-12 PROCEDURE — 97167 OT EVAL HIGH COMPLEX 60 MIN: CPT | Mod: GO

## 2021-06-12 PROCEDURE — 97110 THERAPEUTIC EXERCISES: CPT | Mod: GO

## 2021-06-12 PROCEDURE — 94640 AIRWAY INHALATION TREATMENT: CPT

## 2021-06-12 PROCEDURE — 80048 BASIC METABOLIC PNL TOTAL CA: CPT

## 2021-06-12 PROCEDURE — 999N001212 HC REV CODE 9999 CHARGE CONVERSION OPNP: Mod: GN

## 2021-06-12 PROCEDURE — 94003 VENT MGMT INPAT SUBQ DAY: CPT

## 2021-06-12 PROCEDURE — 36415 COLL VENOUS BLD VENIPUNCTURE: CPT

## 2021-06-12 PROCEDURE — 85027 COMPLETE CBC AUTOMATED: CPT

## 2021-06-12 PROCEDURE — 92610 EVALUATE SWALLOWING FUNCTION: CPT | Mod: GN

## 2021-06-12 ASSESSMENT — MIFFLIN-ST. JEOR: SCORE: 1453.02

## 2021-06-13 ENCOUNTER — COMMUNICATION - HEALTHEAST (OUTPATIENT)
Dept: SCHEDULING | Facility: CLINIC | Age: 71
End: 2021-06-13

## 2021-06-13 LAB
BACTERIA SPEC CULT: NORMAL
BACTERIA SPEC CULT: NORMAL
GLUCOSE BLDC GLUCOMTR-MCNC: 130 MG/DL (ref 70–139)
GLUCOSE BLDC GLUCOMTR-MCNC: 142 MG/DL (ref 70–139)
GLUCOSE BLDC GLUCOMTR-MCNC: 145 MG/DL (ref 70–139)
Lab: NORMAL
PLATELET # BLD AUTO: 394 THOU/UL (ref 140–440)
SPECIMEN SOURCE: NORMAL

## 2021-06-13 PROCEDURE — 85049 AUTOMATED PLATELET COUNT: CPT

## 2021-06-13 PROCEDURE — 94640 AIRWAY INHALATION TREATMENT: CPT

## 2021-06-13 PROCEDURE — 94003 VENT MGMT INPAT SUBQ DAY: CPT

## 2021-06-13 PROCEDURE — 36415 COLL VENOUS BLD VENIPUNCTURE: CPT

## 2021-06-13 PROCEDURE — 999N001212 HC REV CODE 9999 CHARGE CONVERSION OPNP

## 2021-06-14 ENCOUNTER — PATIENT OUTREACH (OUTPATIENT)
Dept: CARE COORDINATION | Facility: CLINIC | Age: 71
End: 2021-06-14

## 2021-06-14 LAB
ALBUMIN SERPL-MCNC: 2.1 G/DL (ref 3.5–5)
ALBUMIN UR-MCNC: ABNORMAL G/DL
ALP SERPL-CCNC: 125 U/L (ref 45–120)
ALT SERPL W P-5'-P-CCNC: 59 U/L (ref 0–45)
AMMONIA PLAS-SCNC: 29 UMOL/L (ref 11–35)
ANION GAP SERPL CALCULATED.3IONS-SCNC: 12 MMOL/L (ref 5–18)
APPEARANCE UR: CLEAR
AST SERPL W P-5'-P-CCNC: 33 U/L (ref 0–40)
BACTERIA #/AREA URNS HPF: ABNORMAL /[HPF]
BASOPHILS # BLD AUTO: 0 THOU/UL (ref 0–0.2)
BASOPHILS NFR BLD AUTO: 0 % (ref 0–2)
BILIRUB DIRECT SERPL-MCNC: 0.2 MG/DL
BILIRUB SERPL-MCNC: 0.4 MG/DL (ref 0–1)
BILIRUB UR QL STRIP: NEGATIVE
BUN SERPL-MCNC: 41 MG/DL (ref 8–22)
CALCIUM SERPL-MCNC: 9.9 MG/DL (ref 8.5–10.5)
CHLORIDE BLD-SCNC: 116 MMOL/L (ref 98–107)
CO2 SERPL-SCNC: 30 MMOL/L (ref 22–31)
COLOR UR AUTO: ABNORMAL
CREAT SERPL-MCNC: 0.77 MG/DL (ref 0.7–1.3)
EOSINOPHIL # BLD AUTO: 0.1 THOU/UL (ref 0–0.4)
EOSINOPHIL NFR BLD AUTO: 0 % (ref 0–6)
ERYTHROCYTE [DISTWIDTH] IN BLOOD BY AUTOMATED COUNT: 13.8 % (ref 11–14.5)
GFR SERPL CREATININE-BSD FRML MDRD: >60 ML/MIN/1.73M2
GLUCOSE BLD-MCNC: 147 MG/DL (ref 70–125)
GLUCOSE BLDC GLUCOMTR-MCNC: 136 MG/DL (ref 70–139)
GLUCOSE BLDC GLUCOMTR-MCNC: 140 MG/DL (ref 70–139)
GLUCOSE BLDC GLUCOMTR-MCNC: 145 MG/DL (ref 70–139)
GLUCOSE UR STRIP-MCNC: NEGATIVE MG/DL
HCT VFR BLD AUTO: 30.7 % (ref 40–54)
HGB BLD-MCNC: 9.4 G/DL (ref 14–18)
HGB UR QL STRIP: NEGATIVE
HYALINE CASTS: 1 LPF
IMM GRANULOCYTES # BLD: 0.1 THOU/UL
IMM GRANULOCYTES NFR BLD: 1 %
KETONES UR STRIP-MCNC: NEGATIVE MG/DL
LEUKOCYTE ESTERASE UR QL STRIP: NEGATIVE
LYMPHOCYTES # BLD AUTO: 0.6 THOU/UL (ref 0.8–4.4)
LYMPHOCYTES NFR BLD AUTO: 5 % (ref 20–40)
MCH RBC QN AUTO: 30.6 PG (ref 27–34)
MCHC RBC AUTO-ENTMCNC: 30.6 G/DL (ref 32–36)
MCV RBC AUTO: 100 FL (ref 80–100)
MONOCYTES # BLD AUTO: 0.7 THOU/UL (ref 0–0.9)
MONOCYTES NFR BLD AUTO: 5 % (ref 2–10)
NEUTROPHILS # BLD AUTO: 11 THOU/UL (ref 2–7.7)
NEUTROPHILS NFR BLD AUTO: 89 % (ref 50–70)
NITRATE UR QL: NEGATIVE
PH UR STRIP: 7 [PH] (ref 5–8)
PLATELET # BLD AUTO: 372 THOU/UL (ref 140–440)
PMV BLD AUTO: 9.4 FL (ref 8.5–12.5)
POTASSIUM BLD-SCNC: 3.9 MMOL/L (ref 3.5–5)
PROT SERPL-MCNC: 7.7 G/DL (ref 6–8)
RBC # BLD AUTO: 3.07 MILL/UL (ref 4.4–6.2)
RBC URINE: 2 HPF
SODIUM SERPL-SCNC: 157 MMOL/L (ref 136–145)
SODIUM SERPL-SCNC: 158 MMOL/L (ref 136–145)
SP GR UR STRIP: 1.02 (ref 1–1.03)
SQUAMOUS EPITHELIAL: 0 /HPF
UROBILINOGEN UR STRIP-ACNC: ABNORMAL
WBC URINE: 4 HPF
WBC: 12.5 THOU/UL (ref 4–11)

## 2021-06-14 PROCEDURE — 85025 COMPLETE CBC W/AUTO DIFF WBC: CPT

## 2021-06-14 PROCEDURE — 94003 VENT MGMT INPAT SUBQ DAY: CPT

## 2021-06-14 PROCEDURE — 71045 X-RAY EXAM CHEST 1 VIEW: CPT

## 2021-06-14 PROCEDURE — 97162 PT EVAL MOD COMPLEX 30 MIN: CPT | Mod: GP

## 2021-06-14 PROCEDURE — 81003 URINALYSIS AUTO W/O SCOPE: CPT

## 2021-06-14 PROCEDURE — 70450 CT HEAD/BRAIN W/O DYE: CPT

## 2021-06-14 PROCEDURE — 92523 SPEECH SOUND LANG COMPREHEN: CPT | Mod: GN

## 2021-06-14 PROCEDURE — 80053 COMPREHEN METABOLIC PANEL: CPT

## 2021-06-14 PROCEDURE — 82140 ASSAY OF AMMONIA: CPT

## 2021-06-14 PROCEDURE — 84295 ASSAY OF SERUM SODIUM: CPT | Mod: 91

## 2021-06-14 PROCEDURE — 999N001212 HC REV CODE 9999 CHARGE CONVERSION OPNP: Mod: GO

## 2021-06-14 PROCEDURE — G0463 HOSPITAL OUTPT CLINIC VISIT: HCPCS

## 2021-06-14 PROCEDURE — 94640 AIRWAY INHALATION TREATMENT: CPT

## 2021-06-14 PROCEDURE — 36415 COLL VENOUS BLD VENIPUNCTURE: CPT

## 2021-06-14 PROCEDURE — 97110 THERAPEUTIC EXERCISES: CPT | Mod: GO

## 2021-06-14 PROCEDURE — 82248 BILIRUBIN DIRECT: CPT

## 2021-06-14 PROCEDURE — 97535 SELF CARE MNGMENT TRAINING: CPT | Mod: GO

## 2021-06-15 LAB
ANION GAP SERPL CALCULATED.3IONS-SCNC: 9 MMOL/L (ref 5–18)
BACTERIA SPEC CULT: NORMAL
BASOPHILS # BLD AUTO: 0 THOU/UL (ref 0–0.2)
BASOPHILS NFR BLD AUTO: 0 % (ref 0–2)
BUN SERPL-MCNC: 35 MG/DL (ref 8–22)
CALCIUM SERPL-MCNC: 9.6 MG/DL (ref 8.5–10.5)
CHLORIDE BLD-SCNC: 108 MMOL/L (ref 98–107)
CO2 SERPL-SCNC: 30 MMOL/L (ref 22–31)
CREAT SERPL-MCNC: 0.69 MG/DL (ref 0.7–1.3)
EOSINOPHIL # BLD AUTO: 0.2 THOU/UL (ref 0–0.4)
EOSINOPHIL NFR BLD AUTO: 2 % (ref 0–6)
ERYTHROCYTE [DISTWIDTH] IN BLOOD BY AUTOMATED COUNT: 13.3 % (ref 11–14.5)
GFR SERPL CREATININE-BSD FRML MDRD: >60 ML/MIN/1.73M2
GLUCOSE BLD-MCNC: 127 MG/DL (ref 70–125)
GLUCOSE BLDC GLUCOMTR-MCNC: 114 MG/DL (ref 70–139)
GLUCOSE BLDC GLUCOMTR-MCNC: 125 MG/DL (ref 70–139)
GLUCOSE BLDC GLUCOMTR-MCNC: 132 MG/DL (ref 70–139)
GLUCOSE BLDC GLUCOMTR-MCNC: 143 MG/DL (ref 70–139)
HCT VFR BLD AUTO: 28.4 % (ref 40–54)
HGB BLD-MCNC: 8.6 G/DL (ref 14–18)
IMM GRANULOCYTES # BLD: 0 THOU/UL
IMM GRANULOCYTES NFR BLD: 0 %
LYMPHOCYTES # BLD AUTO: 0.8 THOU/UL (ref 0.8–4.4)
LYMPHOCYTES NFR BLD AUTO: 10 % (ref 20–40)
Lab: NORMAL
MCH RBC QN AUTO: 30.1 PG (ref 27–34)
MCHC RBC AUTO-ENTMCNC: 30.3 G/DL (ref 32–36)
MCV RBC AUTO: 99 FL (ref 80–100)
MONOCYTES # BLD AUTO: 0.4 THOU/UL (ref 0–0.9)
MONOCYTES NFR BLD AUTO: 5 % (ref 2–10)
NEUTROPHILS # BLD AUTO: 6.7 THOU/UL (ref 2–7.7)
NEUTROPHILS NFR BLD AUTO: 82 % (ref 50–70)
PLATELET # BLD AUTO: 288 THOU/UL (ref 140–440)
PMV BLD AUTO: 9.8 FL (ref 8.5–12.5)
POTASSIUM BLD-SCNC: 3.4 MMOL/L (ref 3.5–5)
POTASSIUM BLD-SCNC: 4.2 MMOL/L (ref 3.5–5)
RBC # BLD AUTO: 2.86 MILL/UL (ref 4.4–6.2)
SODIUM SERPL-SCNC: 145 MMOL/L (ref 136–145)
SODIUM SERPL-SCNC: 147 MMOL/L (ref 136–145)
SPECIMEN SOURCE: NORMAL
WBC: 8.1 THOU/UL (ref 4–11)

## 2021-06-15 PROCEDURE — 97535 SELF CARE MNGMENT TRAINING: CPT | Mod: GO

## 2021-06-15 PROCEDURE — 80048 BASIC METABOLIC PNL TOTAL CA: CPT

## 2021-06-15 PROCEDURE — 97112 NEUROMUSCULAR REEDUCATION: CPT | Mod: GP

## 2021-06-15 PROCEDURE — 84132 ASSAY OF SERUM POTASSIUM: CPT | Mod: 91

## 2021-06-15 PROCEDURE — 94640 AIRWAY INHALATION TREATMENT: CPT

## 2021-06-15 PROCEDURE — 97110 THERAPEUTIC EXERCISES: CPT | Mod: GO

## 2021-06-15 PROCEDURE — 84295 ASSAY OF SERUM SODIUM: CPT | Mod: 91

## 2021-06-15 PROCEDURE — 85025 COMPLETE CBC W/AUTO DIFF WBC: CPT

## 2021-06-15 PROCEDURE — 999N001212 HC REV CODE 9999 CHARGE CONVERSION OPNP: Mod: GP

## 2021-06-15 PROCEDURE — 36415 COLL VENOUS BLD VENIPUNCTURE: CPT

## 2021-06-15 PROCEDURE — 97530 THERAPEUTIC ACTIVITIES: CPT | Mod: GP

## 2021-06-16 LAB
ANION GAP SERPL CALCULATED.3IONS-SCNC: 7 MMOL/L (ref 5–18)
BASOPHILS # BLD AUTO: 0 THOU/UL (ref 0–0.2)
BASOPHILS NFR BLD AUTO: 0 % (ref 0–2)
BUN SERPL-MCNC: 28 MG/DL (ref 8–22)
CALCIUM SERPL-MCNC: 9.4 MG/DL (ref 8.5–10.5)
CHLORIDE BLD-SCNC: 105 MMOL/L (ref 98–107)
CO2 SERPL-SCNC: 30 MMOL/L (ref 22–31)
CREAT SERPL-MCNC: 0.67 MG/DL (ref 0.7–1.3)
EOSINOPHIL # BLD AUTO: 0.2 THOU/UL (ref 0–0.4)
EOSINOPHIL NFR BLD AUTO: 3 % (ref 0–6)
ERYTHROCYTE [DISTWIDTH] IN BLOOD BY AUTOMATED COUNT: 13 % (ref 11–14.5)
GFR SERPL CREATININE-BSD FRML MDRD: >60 ML/MIN/1.73M2
GLUCOSE BLD-MCNC: 131 MG/DL (ref 70–125)
GLUCOSE BLDC GLUCOMTR-MCNC: 102 MG/DL (ref 70–139)
GLUCOSE BLDC GLUCOMTR-MCNC: 108 MG/DL (ref 70–139)
GLUCOSE BLDC GLUCOMTR-MCNC: 110 MG/DL (ref 70–139)
GLUCOSE BLDC GLUCOMTR-MCNC: 118 MG/DL (ref 70–139)
GLUCOSE BLDC GLUCOMTR-MCNC: 86 MG/DL (ref 70–139)
HCT VFR BLD AUTO: 26.5 % (ref 40–54)
HGB BLD-MCNC: 8.2 G/DL (ref 14–18)
IMM GRANULOCYTES # BLD: 0 THOU/UL
IMM GRANULOCYTES NFR BLD: 0 %
LYMPHOCYTES # BLD AUTO: 0.8 THOU/UL (ref 0.8–4.4)
LYMPHOCYTES NFR BLD AUTO: 12 % (ref 20–40)
MCH RBC QN AUTO: 29.7 PG (ref 27–34)
MCHC RBC AUTO-ENTMCNC: 30.9 G/DL (ref 32–36)
MCV RBC AUTO: 96 FL (ref 80–100)
MONOCYTES # BLD AUTO: 0.4 THOU/UL (ref 0–0.9)
MONOCYTES NFR BLD AUTO: 7 % (ref 2–10)
NEUTROPHILS # BLD AUTO: 5.2 THOU/UL (ref 2–7.7)
NEUTROPHILS NFR BLD AUTO: 77 % (ref 50–70)
PLATELET # BLD AUTO: 250 THOU/UL (ref 140–440)
PMV BLD AUTO: 10 FL (ref 8.5–12.5)
POTASSIUM BLD-SCNC: 4 MMOL/L (ref 3.5–5)
RBC # BLD AUTO: 2.76 MILL/UL (ref 4.4–6.2)
SODIUM SERPL-SCNC: 142 MMOL/L (ref 136–145)
WBC: 6.7 THOU/UL (ref 4–11)

## 2021-06-16 PROCEDURE — 70450 CT HEAD/BRAIN W/O DYE: CPT

## 2021-06-16 PROCEDURE — 85025 COMPLETE CBC W/AUTO DIFF WBC: CPT

## 2021-06-16 PROCEDURE — 97530 THERAPEUTIC ACTIVITIES: CPT | Mod: GO

## 2021-06-16 PROCEDURE — 36415 COLL VENOUS BLD VENIPUNCTURE: CPT

## 2021-06-16 PROCEDURE — 94003 VENT MGMT INPAT SUBQ DAY: CPT

## 2021-06-16 PROCEDURE — 92606 NON-SPEECH DEVICE SERVICE: CPT | Mod: GN

## 2021-06-16 PROCEDURE — 92507 TX SP LANG VOICE COMM INDIV: CPT | Mod: GN

## 2021-06-16 PROCEDURE — 97535 SELF CARE MNGMENT TRAINING: CPT | Mod: GO

## 2021-06-16 PROCEDURE — 97112 NEUROMUSCULAR REEDUCATION: CPT | Mod: GP

## 2021-06-16 PROCEDURE — 97110 THERAPEUTIC EXERCISES: CPT | Mod: GP

## 2021-06-16 PROCEDURE — 999N001212 HC REV CODE 9999 CHARGE CONVERSION OPNP: Mod: GN

## 2021-06-16 PROCEDURE — 80048 BASIC METABOLIC PNL TOTAL CA: CPT

## 2021-06-16 PROCEDURE — 94640 AIRWAY INHALATION TREATMENT: CPT

## 2021-06-16 ASSESSMENT — MIFFLIN-ST. JEOR: SCORE: 1419.45

## 2021-06-17 LAB
ANION GAP SERPL CALCULATED.3IONS-SCNC: 8 MMOL/L (ref 5–18)
BUN SERPL-MCNC: 29 MG/DL (ref 8–22)
CALCIUM SERPL-MCNC: 9.5 MG/DL (ref 8.5–10.5)
CHLORIDE BLD-SCNC: 106 MMOL/L (ref 98–107)
CO2 SERPL-SCNC: 29 MMOL/L (ref 22–31)
CREAT SERPL-MCNC: 0.69 MG/DL (ref 0.7–1.3)
GFR SERPL CREATININE-BSD FRML MDRD: >60 ML/MIN/1.73M2
GLUCOSE BLD-MCNC: 136 MG/DL (ref 70–125)
GLUCOSE BLDC GLUCOMTR-MCNC: 106 MG/DL (ref 70–139)
GLUCOSE BLDC GLUCOMTR-MCNC: 110 MG/DL (ref 70–139)
GLUCOSE BLDC GLUCOMTR-MCNC: 131 MG/DL (ref 70–139)
GLUCOSE BLDC GLUCOMTR-MCNC: 134 MG/DL (ref 70–139)
POTASSIUM BLD-SCNC: 3.9 MMOL/L (ref 3.5–5)
SODIUM SERPL-SCNC: 143 MMOL/L (ref 136–145)

## 2021-06-17 PROCEDURE — 97535 SELF CARE MNGMENT TRAINING: CPT | Mod: GO

## 2021-06-17 PROCEDURE — 999N001212 HC REV CODE 9999 CHARGE CONVERSION OPNP: Mod: GO

## 2021-06-17 PROCEDURE — 94003 VENT MGMT INPAT SUBQ DAY: CPT

## 2021-06-17 PROCEDURE — 92507 TX SP LANG VOICE COMM INDIV: CPT | Mod: GN

## 2021-06-17 PROCEDURE — 92597 ORAL SPEECH DEVICE EVAL: CPT | Mod: GN

## 2021-06-17 PROCEDURE — 36415 COLL VENOUS BLD VENIPUNCTURE: CPT

## 2021-06-17 PROCEDURE — 94640 AIRWAY INHALATION TREATMENT: CPT

## 2021-06-17 PROCEDURE — 80048 BASIC METABOLIC PNL TOTAL CA: CPT

## 2021-06-18 LAB
ANION GAP SERPL CALCULATED.3IONS-SCNC: 8 MMOL/L (ref 5–18)
BASOPHILS # BLD AUTO: 0 THOU/UL (ref 0–0.2)
BASOPHILS NFR BLD AUTO: 0 % (ref 0–2)
BUN SERPL-MCNC: 31 MG/DL (ref 8–22)
CALCIUM SERPL-MCNC: 9.2 MG/DL (ref 8.5–10.5)
CHLORIDE BLD-SCNC: 104 MMOL/L (ref 98–107)
CO2 SERPL-SCNC: 30 MMOL/L (ref 22–31)
CREAT SERPL-MCNC: 0.67 MG/DL (ref 0.7–1.3)
EOSINOPHIL # BLD AUTO: 0.3 THOU/UL (ref 0–0.4)
EOSINOPHIL NFR BLD AUTO: 4 % (ref 0–6)
ERYTHROCYTE [DISTWIDTH] IN BLOOD BY AUTOMATED COUNT: 13.1 % (ref 11–14.5)
GFR SERPL CREATININE-BSD FRML MDRD: >60 ML/MIN/1.73M2
GLUCOSE BLD-MCNC: 108 MG/DL (ref 70–125)
GLUCOSE BLDC GLUCOMTR-MCNC: 106 MG/DL (ref 70–139)
GLUCOSE BLDC GLUCOMTR-MCNC: 111 MG/DL (ref 70–139)
GLUCOSE BLDC GLUCOMTR-MCNC: 128 MG/DL (ref 70–139)
GLUCOSE BLDC GLUCOMTR-MCNC: 131 MG/DL (ref 70–139)
HCT VFR BLD AUTO: 28.2 % (ref 40–54)
HGB BLD-MCNC: 9 G/DL (ref 14–18)
IMM GRANULOCYTES # BLD: 0 THOU/UL
IMM GRANULOCYTES NFR BLD: 0 %
LYMPHOCYTES # BLD AUTO: 0.8 THOU/UL (ref 0.8–4.4)
LYMPHOCYTES NFR BLD AUTO: 13 % (ref 20–40)
MCH RBC QN AUTO: 30.9 PG (ref 27–34)
MCHC RBC AUTO-ENTMCNC: 31.9 G/DL (ref 32–36)
MCV RBC AUTO: 97 FL (ref 80–100)
MONOCYTES # BLD AUTO: 0.5 THOU/UL (ref 0–0.9)
MONOCYTES NFR BLD AUTO: 8 % (ref 2–10)
NEUTROPHILS # BLD AUTO: 4.6 THOU/UL (ref 2–7.7)
NEUTROPHILS NFR BLD AUTO: 75 % (ref 50–70)
PLATELET # BLD AUTO: 229 THOU/UL (ref 140–440)
PMV BLD AUTO: 10.3 FL (ref 8.5–12.5)
POTASSIUM BLD-SCNC: 4 MMOL/L (ref 3.5–5)
PROCALCITONIN SERPL-MCNC: 0.07 NG/ML (ref 0–0.49)
RBC # BLD AUTO: 2.91 MILL/UL (ref 4.4–6.2)
SODIUM SERPL-SCNC: 142 MMOL/L (ref 136–145)
WBC: 6.1 THOU/UL (ref 4–11)

## 2021-06-18 PROCEDURE — 36415 COLL VENOUS BLD VENIPUNCTURE: CPT

## 2021-06-18 PROCEDURE — 92507 TX SP LANG VOICE COMM INDIV: CPT | Mod: GN

## 2021-06-18 PROCEDURE — 94003 VENT MGMT INPAT SUBQ DAY: CPT

## 2021-06-18 PROCEDURE — 999N001212 HC REV CODE 9999 CHARGE CONVERSION OPNP: Mod: GP

## 2021-06-18 PROCEDURE — 97535 SELF CARE MNGMENT TRAINING: CPT | Mod: GO

## 2021-06-18 PROCEDURE — 94640 AIRWAY INHALATION TREATMENT: CPT

## 2021-06-18 PROCEDURE — 71045 X-RAY EXAM CHEST 1 VIEW: CPT

## 2021-06-18 PROCEDURE — 80048 BASIC METABOLIC PNL TOTAL CA: CPT

## 2021-06-18 PROCEDURE — 85025 COMPLETE CBC W/AUTO DIFF WBC: CPT

## 2021-06-18 PROCEDURE — 97110 THERAPEUTIC EXERCISES: CPT | Mod: GP

## 2021-06-18 PROCEDURE — 97112 NEUROMUSCULAR REEDUCATION: CPT | Mod: GP

## 2021-06-18 PROCEDURE — 84145 PROCALCITONIN (PCT): CPT

## 2021-06-19 LAB
ANION GAP SERPL CALCULATED.3IONS-SCNC: 8 MMOL/L (ref 5–18)
BUN SERPL-MCNC: 30 MG/DL (ref 8–22)
CALCIUM SERPL-MCNC: 9.9 MG/DL (ref 8.5–10.5)
CHLORIDE BLD-SCNC: 104 MMOL/L (ref 98–107)
CO2 SERPL-SCNC: 31 MMOL/L (ref 22–31)
CREAT SERPL-MCNC: 0.65 MG/DL (ref 0.7–1.3)
GFR SERPL CREATININE-BSD FRML MDRD: >60 ML/MIN/1.73M2
GLUCOSE BLD-MCNC: 117 MG/DL (ref 70–125)
GLUCOSE BLDC GLUCOMTR-MCNC: 106 MG/DL (ref 70–139)
GLUCOSE BLDC GLUCOMTR-MCNC: 114 MG/DL (ref 70–139)
GLUCOSE BLDC GLUCOMTR-MCNC: 125 MG/DL (ref 70–139)
GLUCOSE BLDC GLUCOMTR-MCNC: 136 MG/DL (ref 70–139)
GLUCOSE BLDC GLUCOMTR-MCNC: 141 MG/DL (ref 70–139)
POTASSIUM BLD-SCNC: 4.2 MMOL/L (ref 3.5–5)
SARS-COV-2 PCR RESULT-HE - HISTORICAL: NEGATIVE
SODIUM SERPL-SCNC: 143 MMOL/L (ref 136–145)

## 2021-06-19 PROCEDURE — 87205 SMEAR GRAM STAIN: CPT

## 2021-06-19 PROCEDURE — 97110 THERAPEUTIC EXERCISES: CPT | Mod: GP

## 2021-06-19 PROCEDURE — 97112 NEUROMUSCULAR REEDUCATION: CPT | Mod: GP

## 2021-06-19 PROCEDURE — 94003 VENT MGMT INPAT SUBQ DAY: CPT

## 2021-06-19 PROCEDURE — 87070 CULTURE OTHR SPECIMN AEROBIC: CPT

## 2021-06-19 PROCEDURE — 80048 BASIC METABOLIC PNL TOTAL CA: CPT

## 2021-06-19 PROCEDURE — 87077 CULTURE AEROBIC IDENTIFY: CPT

## 2021-06-19 PROCEDURE — 94640 AIRWAY INHALATION TREATMENT: CPT

## 2021-06-19 PROCEDURE — 999N001212 HC REV CODE 9999 CHARGE CONVERSION OPNP

## 2021-06-19 PROCEDURE — U0002 COVID-19 LAB TEST NON-CDC: HCPCS

## 2021-06-19 PROCEDURE — 97530 THERAPEUTIC ACTIVITIES: CPT | Mod: GP

## 2021-06-19 PROCEDURE — 87186 SC STD MICRODIL/AGAR DIL: CPT

## 2021-06-19 ASSESSMENT — MIFFLIN-ST. JEOR: SCORE: 1461.19

## 2021-06-20 ENCOUNTER — COMMUNICATION - HEALTHEAST (OUTPATIENT)
Dept: SCHEDULING | Facility: CLINIC | Age: 71
End: 2021-06-20

## 2021-06-20 LAB
ANION GAP SERPL CALCULATED.3IONS-SCNC: 9 MMOL/L (ref 5–18)
BUN SERPL-MCNC: 34 MG/DL (ref 8–22)
CALCIUM SERPL-MCNC: 9.7 MG/DL (ref 8.5–10.5)
CHLORIDE BLD-SCNC: 103 MMOL/L (ref 98–107)
CO2 SERPL-SCNC: 31 MMOL/L (ref 22–31)
CREAT SERPL-MCNC: 0.67 MG/DL (ref 0.7–1.3)
GFR SERPL CREATININE-BSD FRML MDRD: >60 ML/MIN/1.73M2
GLUCOSE BLD-MCNC: 135 MG/DL (ref 70–125)
GLUCOSE BLDC GLUCOMTR-MCNC: 103 MG/DL (ref 70–139)
GLUCOSE BLDC GLUCOMTR-MCNC: 117 MG/DL (ref 70–139)
GLUCOSE BLDC GLUCOMTR-MCNC: 130 MG/DL (ref 70–139)
GLUCOSE BLDC GLUCOMTR-MCNC: 132 MG/DL (ref 70–139)
POTASSIUM BLD-SCNC: 4 MMOL/L (ref 3.5–5)
SODIUM SERPL-SCNC: 143 MMOL/L (ref 136–145)

## 2021-06-20 PROCEDURE — 999N001212 HC REV CODE 9999 CHARGE CONVERSION OPNP

## 2021-06-20 PROCEDURE — 94003 VENT MGMT INPAT SUBQ DAY: CPT

## 2021-06-20 PROCEDURE — 80048 BASIC METABOLIC PNL TOTAL CA: CPT

## 2021-06-20 PROCEDURE — 97110 THERAPEUTIC EXERCISES: CPT | Mod: GO

## 2021-06-20 PROCEDURE — 36415 COLL VENOUS BLD VENIPUNCTURE: CPT

## 2021-06-20 PROCEDURE — 94640 AIRWAY INHALATION TREATMENT: CPT

## 2021-06-20 PROCEDURE — 97530 THERAPEUTIC ACTIVITIES: CPT | Mod: GO

## 2021-06-21 LAB
ANION GAP SERPL CALCULATED.3IONS-SCNC: 10 MMOL/L (ref 5–18)
BASOPHILS # BLD AUTO: 0 THOU/UL (ref 0–0.2)
BASOPHILS NFR BLD AUTO: 0 % (ref 0–2)
BUN SERPL-MCNC: 36 MG/DL (ref 8–22)
CALCIUM SERPL-MCNC: 9.7 MG/DL (ref 8.5–10.5)
CHLORIDE BLD-SCNC: 103 MMOL/L (ref 98–107)
CO2 SERPL-SCNC: 28 MMOL/L (ref 22–31)
CREAT SERPL-MCNC: 0.67 MG/DL (ref 0.7–1.3)
EOSINOPHIL # BLD AUTO: 0.2 THOU/UL (ref 0–0.4)
EOSINOPHIL NFR BLD AUTO: 3 % (ref 0–6)
ERYTHROCYTE [DISTWIDTH] IN BLOOD BY AUTOMATED COUNT: 13.6 % (ref 11–14.5)
GFR SERPL CREATININE-BSD FRML MDRD: >60 ML/MIN/1.73M2
GLUCOSE BLD-MCNC: 131 MG/DL (ref 70–125)
GLUCOSE BLDC GLUCOMTR-MCNC: 114 MG/DL (ref 70–139)
GLUCOSE BLDC GLUCOMTR-MCNC: 131 MG/DL (ref 70–139)
GLUCOSE BLDC GLUCOMTR-MCNC: 133 MG/DL (ref 70–139)
GLUCOSE BLDC GLUCOMTR-MCNC: 135 MG/DL (ref 70–139)
GLUCOSE BLDC GLUCOMTR-MCNC: 135 MG/DL (ref 70–139)
HCT VFR BLD AUTO: 28.8 % (ref 40–54)
HGB BLD-MCNC: 9.1 G/DL (ref 14–18)
IMM GRANULOCYTES # BLD: 0 THOU/UL
IMM GRANULOCYTES NFR BLD: 0 %
LYMPHOCYTES # BLD AUTO: 1.1 THOU/UL (ref 0.8–4.4)
LYMPHOCYTES NFR BLD AUTO: 13 % (ref 20–40)
MCH RBC QN AUTO: 31 PG (ref 27–34)
MCHC RBC AUTO-ENTMCNC: 31.6 G/DL (ref 32–36)
MCV RBC AUTO: 98 FL (ref 80–100)
MONOCYTES # BLD AUTO: 0.6 THOU/UL (ref 0–0.9)
MONOCYTES NFR BLD AUTO: 7 % (ref 2–10)
NEUTROPHILS # BLD AUTO: 6.2 THOU/UL (ref 2–7.7)
NEUTROPHILS NFR BLD AUTO: 76 % (ref 50–70)
PLATELET # BLD AUTO: 284 THOU/UL (ref 140–440)
PMV BLD AUTO: 10.8 FL (ref 8.5–12.5)
POTASSIUM BLD-SCNC: 4 MMOL/L (ref 3.5–5)
RBC # BLD AUTO: 2.94 MILL/UL (ref 4.4–6.2)
SODIUM SERPL-SCNC: 141 MMOL/L (ref 136–145)
WBC: 8.1 THOU/UL (ref 4–11)

## 2021-06-21 PROCEDURE — 97530 THERAPEUTIC ACTIVITIES: CPT | Mod: GO

## 2021-06-21 PROCEDURE — 36415 COLL VENOUS BLD VENIPUNCTURE: CPT

## 2021-06-21 PROCEDURE — 94640 AIRWAY INHALATION TREATMENT: CPT

## 2021-06-21 PROCEDURE — 97112 NEUROMUSCULAR REEDUCATION: CPT | Mod: GP

## 2021-06-21 PROCEDURE — 999N001212 HC REV CODE 9999 CHARGE CONVERSION OPNP: Mod: GO

## 2021-06-21 PROCEDURE — 80048 BASIC METABOLIC PNL TOTAL CA: CPT

## 2021-06-21 PROCEDURE — 94003 VENT MGMT INPAT SUBQ DAY: CPT

## 2021-06-21 PROCEDURE — 85025 COMPLETE CBC W/AUTO DIFF WBC: CPT

## 2021-06-21 ASSESSMENT — MIFFLIN-ST. JEOR: SCORE: 1426.71

## 2021-06-22 ENCOUNTER — COMMUNICATION - HEALTHEAST (OUTPATIENT)
Dept: SCHEDULING | Facility: CLINIC | Age: 71
End: 2021-06-22

## 2021-06-22 LAB
ALBUMIN UR-MCNC: NEGATIVE G/DL
ANION GAP SERPL CALCULATED.3IONS-SCNC: 8 MMOL/L (ref 5–18)
APPEARANCE UR: CLEAR
BACTERIA SPEC CULT: ABNORMAL
BILIRUB UR QL STRIP: NEGATIVE
BUN SERPL-MCNC: 34 MG/DL (ref 8–22)
CALCIUM SERPL-MCNC: 9.6 MG/DL (ref 8.5–10.5)
CHLORIDE BLD-SCNC: 102 MMOL/L (ref 98–107)
CO2 SERPL-SCNC: 29 MMOL/L (ref 22–31)
COLOR UR AUTO: NORMAL
CREAT SERPL-MCNC: 0.66 MG/DL (ref 0.7–1.3)
GFR SERPL CREATININE-BSD FRML MDRD: >60 ML/MIN/1.73M2
GLUCOSE BLD-MCNC: 108 MG/DL (ref 70–125)
GLUCOSE BLDC GLUCOMTR-MCNC: 121 MG/DL (ref 70–139)
GLUCOSE BLDC GLUCOMTR-MCNC: 126 MG/DL (ref 70–139)
GLUCOSE BLDC GLUCOMTR-MCNC: 129 MG/DL (ref 70–139)
GLUCOSE BLDC GLUCOMTR-MCNC: 131 MG/DL (ref 70–139)
GLUCOSE BLDC GLUCOMTR-MCNC: 134 MG/DL (ref 70–139)
GLUCOSE UR STRIP-MCNC: NEGATIVE MG/DL
GRAM STAIN RESULT: ABNORMAL
GRAM STAIN RESULT: ABNORMAL
HGB UR QL STRIP: NEGATIVE
KETONES UR STRIP-MCNC: NEGATIVE MG/DL
LEUKOCYTE ESTERASE UR QL STRIP: NEGATIVE
NITRATE UR QL: NEGATIVE
PH UR STRIP: 7 [PH] (ref 5–8)
POTASSIUM BLD-SCNC: 4.3 MMOL/L (ref 3.5–5)
SODIUM SERPL-SCNC: 139 MMOL/L (ref 136–145)
SP GR UR STRIP: 1.01 (ref 1–1.03)
UROBILINOGEN UR STRIP-ACNC: NORMAL

## 2021-06-22 PROCEDURE — 999N001212 HC REV CODE 9999 CHARGE CONVERSION OPNP: Mod: GN

## 2021-06-22 PROCEDURE — 36415 COLL VENOUS BLD VENIPUNCTURE: CPT

## 2021-06-22 PROCEDURE — 93976 VASCULAR STUDY: CPT

## 2021-06-22 PROCEDURE — 80048 BASIC METABOLIC PNL TOTAL CA: CPT

## 2021-06-22 PROCEDURE — 94003 VENT MGMT INPAT SUBQ DAY: CPT

## 2021-06-22 PROCEDURE — 94640 AIRWAY INHALATION TREATMENT: CPT

## 2021-06-22 PROCEDURE — 81003 URINALYSIS AUTO W/O SCOPE: CPT

## 2021-06-22 PROCEDURE — 92507 TX SP LANG VOICE COMM INDIV: CPT | Mod: GN

## 2021-06-22 PROCEDURE — 97530 THERAPEUTIC ACTIVITIES: CPT | Mod: GP

## 2021-06-22 PROCEDURE — 76870 US EXAM SCROTUM: CPT

## 2021-06-22 ASSESSMENT — MIFFLIN-ST. JEOR: SCORE: 1424.9

## 2021-06-23 LAB
ANION GAP SERPL CALCULATED.3IONS-SCNC: 9 MMOL/L (ref 5–18)
BASOPHILS # BLD AUTO: 0 THOU/UL (ref 0–0.2)
BASOPHILS NFR BLD AUTO: 0 % (ref 0–2)
BUN SERPL-MCNC: 34 MG/DL (ref 8–22)
CALCIUM SERPL-MCNC: 9.9 MG/DL (ref 8.5–10.5)
CHLORIDE BLD-SCNC: 102 MMOL/L (ref 98–107)
CO2 SERPL-SCNC: 29 MMOL/L (ref 22–31)
CREAT SERPL-MCNC: 0.66 MG/DL (ref 0.7–1.3)
EOSINOPHIL # BLD AUTO: 0.2 THOU/UL (ref 0–0.4)
EOSINOPHIL NFR BLD AUTO: 3 % (ref 0–6)
ERYTHROCYTE [DISTWIDTH] IN BLOOD BY AUTOMATED COUNT: 13.9 % (ref 11–14.5)
GFR SERPL CREATININE-BSD FRML MDRD: >60 ML/MIN/1.73M2
GLUCOSE BLD-MCNC: 133 MG/DL (ref 70–125)
GLUCOSE BLDC GLUCOMTR-MCNC: 108 MG/DL (ref 70–139)
GLUCOSE BLDC GLUCOMTR-MCNC: 111 MG/DL (ref 70–139)
GLUCOSE BLDC GLUCOMTR-MCNC: 118 MG/DL (ref 70–139)
GLUCOSE BLDC GLUCOMTR-MCNC: 129 MG/DL (ref 70–139)
HCT VFR BLD AUTO: 28.7 % (ref 40–54)
HGB BLD-MCNC: 9.2 G/DL (ref 14–18)
IMM GRANULOCYTES # BLD: 0 THOU/UL
IMM GRANULOCYTES NFR BLD: 0 %
LYMPHOCYTES # BLD AUTO: 1 THOU/UL (ref 0.8–4.4)
LYMPHOCYTES NFR BLD AUTO: 17 % (ref 20–40)
MCH RBC QN AUTO: 30.8 PG (ref 27–34)
MCHC RBC AUTO-ENTMCNC: 32.1 G/DL (ref 32–36)
MCV RBC AUTO: 96 FL (ref 80–100)
MONOCYTES # BLD AUTO: 0.5 THOU/UL (ref 0–0.9)
MONOCYTES NFR BLD AUTO: 8 % (ref 2–10)
NEUTROPHILS # BLD AUTO: 4.4 THOU/UL (ref 2–7.7)
NEUTROPHILS NFR BLD AUTO: 72 % (ref 50–70)
PLATELET # BLD AUTO: 351 THOU/UL (ref 140–440)
PMV BLD AUTO: 10.6 FL (ref 8.5–12.5)
POTASSIUM BLD-SCNC: 4.1 MMOL/L (ref 3.5–5)
RBC # BLD AUTO: 2.99 MILL/UL (ref 4.4–6.2)
SODIUM SERPL-SCNC: 140 MMOL/L (ref 136–145)
WBC: 6.2 THOU/UL (ref 4–11)

## 2021-06-23 PROCEDURE — 999N001212 HC REV CODE 9999 CHARGE CONVERSION OPNP: Mod: GN

## 2021-06-23 PROCEDURE — 95816 EEG AWAKE AND DROWSY: CPT

## 2021-06-23 PROCEDURE — 94003 VENT MGMT INPAT SUBQ DAY: CPT

## 2021-06-23 PROCEDURE — 92507 TX SP LANG VOICE COMM INDIV: CPT | Mod: GN

## 2021-06-23 PROCEDURE — 36415 COLL VENOUS BLD VENIPUNCTURE: CPT

## 2021-06-23 PROCEDURE — 80048 BASIC METABOLIC PNL TOTAL CA: CPT

## 2021-06-23 PROCEDURE — 97530 THERAPEUTIC ACTIVITIES: CPT | Mod: GP

## 2021-06-23 PROCEDURE — 97535 SELF CARE MNGMENT TRAINING: CPT | Mod: GO

## 2021-06-23 PROCEDURE — 97112 NEUROMUSCULAR REEDUCATION: CPT | Mod: GO

## 2021-06-23 PROCEDURE — 85025 COMPLETE CBC W/AUTO DIFF WBC: CPT

## 2021-06-23 PROCEDURE — 94640 AIRWAY INHALATION TREATMENT: CPT

## 2021-06-23 ASSESSMENT — MIFFLIN-ST. JEOR: SCORE: 1450.75

## 2021-06-24 LAB
ANION GAP SERPL CALCULATED.3IONS-SCNC: 9 MMOL/L (ref 5–18)
BUN SERPL-MCNC: 34 MG/DL (ref 8–22)
CALCIUM SERPL-MCNC: 10 MG/DL (ref 8.5–10.5)
CHLORIDE BLD-SCNC: 103 MMOL/L (ref 98–107)
CO2 SERPL-SCNC: 28 MMOL/L (ref 22–31)
CREAT SERPL-MCNC: 0.7 MG/DL (ref 0.7–1.3)
GFR SERPL CREATININE-BSD FRML MDRD: >60 ML/MIN/1.73M2
GLUCOSE BLD-MCNC: 127 MG/DL (ref 70–125)
GLUCOSE BLDC GLUCOMTR-MCNC: 115 MG/DL (ref 70–139)
GLUCOSE BLDC GLUCOMTR-MCNC: 129 MG/DL (ref 70–139)
GLUCOSE BLDC GLUCOMTR-MCNC: 140 MG/DL (ref 70–139)
GLUCOSE BLDC GLUCOMTR-MCNC: 146 MG/DL (ref 70–139)
POTASSIUM BLD-SCNC: 4.3 MMOL/L (ref 3.5–5)
SODIUM SERPL-SCNC: 140 MMOL/L (ref 136–145)

## 2021-06-24 PROCEDURE — G0463 HOSPITAL OUTPT CLINIC VISIT: HCPCS

## 2021-06-24 PROCEDURE — 80048 BASIC METABOLIC PNL TOTAL CA: CPT

## 2021-06-24 PROCEDURE — 94640 AIRWAY INHALATION TREATMENT: CPT

## 2021-06-24 PROCEDURE — 999N001212 HC REV CODE 9999 CHARGE CONVERSION OPNP: Mod: GP

## 2021-06-24 PROCEDURE — 36415 COLL VENOUS BLD VENIPUNCTURE: CPT

## 2021-06-24 PROCEDURE — 94003 VENT MGMT INPAT SUBQ DAY: CPT

## 2021-06-24 PROCEDURE — 97530 THERAPEUTIC ACTIVITIES: CPT | Mod: GP

## 2021-06-24 PROCEDURE — 97112 NEUROMUSCULAR REEDUCATION: CPT | Mod: GP

## 2021-06-24 ASSESSMENT — MIFFLIN-ST. JEOR: SCORE: 1430.34

## 2021-06-25 LAB
ANION GAP SERPL CALCULATED.3IONS-SCNC: 9 MMOL/L (ref 5–18)
BASOPHILS # BLD AUTO: 0 THOU/UL (ref 0–0.2)
BASOPHILS NFR BLD AUTO: 1 % (ref 0–2)
BUN SERPL-MCNC: 34 MG/DL (ref 8–22)
CALCIUM SERPL-MCNC: 9.9 MG/DL (ref 8.5–10.5)
CHLORIDE BLD-SCNC: 101 MMOL/L (ref 98–107)
CO2 SERPL-SCNC: 28 MMOL/L (ref 22–31)
CREAT SERPL-MCNC: 0.68 MG/DL (ref 0.7–1.3)
EOSINOPHIL # BLD AUTO: 0.2 THOU/UL (ref 0–0.4)
EOSINOPHIL NFR BLD AUTO: 2 % (ref 0–6)
ERYTHROCYTE [DISTWIDTH] IN BLOOD BY AUTOMATED COUNT: 14.4 % (ref 11–14.5)
GFR SERPL CREATININE-BSD FRML MDRD: >60 ML/MIN/1.73M2
GLUCOSE BLD-MCNC: 132 MG/DL (ref 70–125)
GLUCOSE BLDC GLUCOMTR-MCNC: 107 MG/DL (ref 70–139)
GLUCOSE BLDC GLUCOMTR-MCNC: 119 MG/DL (ref 70–139)
GLUCOSE BLDC GLUCOMTR-MCNC: 122 MG/DL (ref 70–139)
GLUCOSE BLDC GLUCOMTR-MCNC: 123 MG/DL (ref 70–139)
HCT VFR BLD AUTO: 30 % (ref 40–54)
HGB BLD-MCNC: 9.5 G/DL (ref 14–18)
IMM GRANULOCYTES # BLD: 0 THOU/UL
IMM GRANULOCYTES NFR BLD: 1 %
LYMPHOCYTES # BLD AUTO: 1.1 THOU/UL (ref 0.8–4.4)
LYMPHOCYTES NFR BLD AUTO: 13 % (ref 20–40)
MCH RBC QN AUTO: 30.3 PG (ref 27–34)
MCHC RBC AUTO-ENTMCNC: 31.7 G/DL (ref 32–36)
MCV RBC AUTO: 96 FL (ref 80–100)
MONOCYTES # BLD AUTO: 0.6 THOU/UL (ref 0–0.9)
MONOCYTES NFR BLD AUTO: 7 % (ref 2–10)
NEUTROPHILS # BLD AUTO: 6.4 THOU/UL (ref 2–7.7)
NEUTROPHILS NFR BLD AUTO: 76 % (ref 50–70)
PLATELET # BLD AUTO: 378 THOU/UL (ref 140–440)
PMV BLD AUTO: 10.3 FL (ref 8.5–12.5)
POTASSIUM BLD-SCNC: 4.4 MMOL/L (ref 3.5–5)
RBC # BLD AUTO: 3.14 MILL/UL (ref 4.4–6.2)
SODIUM SERPL-SCNC: 138 MMOL/L (ref 136–145)
WBC: 8.4 THOU/UL (ref 4–11)

## 2021-06-25 PROCEDURE — 85025 COMPLETE CBC W/AUTO DIFF WBC: CPT

## 2021-06-25 PROCEDURE — 92526 ORAL FUNCTION THERAPY: CPT | Mod: GN

## 2021-06-25 PROCEDURE — 36415 COLL VENOUS BLD VENIPUNCTURE: CPT

## 2021-06-25 PROCEDURE — 94003 VENT MGMT INPAT SUBQ DAY: CPT

## 2021-06-25 PROCEDURE — 999N001212 HC REV CODE 9999 CHARGE CONVERSION OPNP: Mod: GN

## 2021-06-25 PROCEDURE — 80048 BASIC METABOLIC PNL TOTAL CA: CPT

## 2021-06-25 PROCEDURE — 97530 THERAPEUTIC ACTIVITIES: CPT | Mod: GP

## 2021-06-25 PROCEDURE — 97112 NEUROMUSCULAR REEDUCATION: CPT | Mod: GO

## 2021-06-25 PROCEDURE — 97110 THERAPEUTIC EXERCISES: CPT | Mod: GP

## 2021-06-25 PROCEDURE — 94640 AIRWAY INHALATION TREATMENT: CPT

## 2021-06-25 PROCEDURE — 97129 THER IVNTJ 1ST 15 MIN: CPT | Mod: GO

## 2021-06-25 ASSESSMENT — MIFFLIN-ST. JEOR: SCORE: 1441.34

## 2021-06-26 LAB
ANION GAP SERPL CALCULATED.3IONS-SCNC: 10 MMOL/L (ref 5–18)
BUN SERPL-MCNC: 35 MG/DL (ref 8–22)
CALCIUM SERPL-MCNC: 9.7 MG/DL (ref 8.5–10.5)
CHLORIDE BLD-SCNC: 100 MMOL/L (ref 98–107)
CO2 SERPL-SCNC: 28 MMOL/L (ref 22–31)
CREAT SERPL-MCNC: 0.7 MG/DL (ref 0.7–1.3)
GFR SERPL CREATININE-BSD FRML MDRD: >60 ML/MIN/1.73M2
GLUCOSE BLD-MCNC: 122 MG/DL (ref 70–125)
GLUCOSE BLDC GLUCOMTR-MCNC: 111 MG/DL (ref 70–139)
GLUCOSE BLDC GLUCOMTR-MCNC: 121 MG/DL (ref 70–139)
GLUCOSE BLDC GLUCOMTR-MCNC: 123 MG/DL (ref 70–139)
GLUCOSE BLDC GLUCOMTR-MCNC: 128 MG/DL (ref 70–139)
GLUCOSE BLDC GLUCOMTR-MCNC: 142 MG/DL (ref 70–139)
POTASSIUM BLD-SCNC: 4.4 MMOL/L (ref 3.5–5)
SARS-COV-2 PCR RESULT-HE - HISTORICAL: NEGATIVE
SODIUM SERPL-SCNC: 138 MMOL/L (ref 136–145)

## 2021-06-26 PROCEDURE — U0002 COVID-19 LAB TEST NON-CDC: HCPCS

## 2021-06-26 PROCEDURE — 94640 AIRWAY INHALATION TREATMENT: CPT

## 2021-06-26 PROCEDURE — 999N001212 HC REV CODE 9999 CHARGE CONVERSION OPNP: Mod: GP

## 2021-06-26 PROCEDURE — 97110 THERAPEUTIC EXERCISES: CPT | Mod: GP

## 2021-06-26 PROCEDURE — 80048 BASIC METABOLIC PNL TOTAL CA: CPT

## 2021-06-26 PROCEDURE — 94003 VENT MGMT INPAT SUBQ DAY: CPT

## 2021-06-26 PROCEDURE — 36415 COLL VENOUS BLD VENIPUNCTURE: CPT

## 2021-06-26 ASSESSMENT — MIFFLIN-ST. JEOR: SCORE: 1443.04

## 2021-06-26 NOTE — PROGRESS NOTES
[facility] Pulmonary Medicine - Progress Note    Assessment:  Principal Problem:    SAH (subarachnoid hemorrhage) (H)  Active Problems:    Acute respiratory failure with hypoxia (H)    ESBL (extended spectrum beta-lactamase) producing bacteria infection    Attention to tracheostomy (H)    Acute and chronic respiratory failure with hypoxia (H)    On mechanically assisted ventilation (H)    70 y.o. 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 Course c/b bilateral BAYLEE infarct with vasospasm. Extubated on 6/3/2021 and re-intubated on 6/6/2021 due to hypoxemia. On 6/7/2021, patient underwent tracheostomy and PEG tube placement. Treated for klebsiella and pseudomonal VAP, ESBL.    Recommendations/Plans:    Continue phase 1 weans, making progress. Large secretions.    Continue duonebs    Completing course of cefepime, currently no s/s of acute infection    Trach: 6 Malcolm, placed 6/7/21. Trach change/downsize at 10-14 days, depending on progress with PS weaning    SLP consulted. Mentation, confusion remains an issue    6/12 BDT - no immediate, doing supervised in-line PMV      Discussed my impressions and recommendations for care with RT.      Jacklyn Howard MD  Pulmonary and Critical Care Medicine  [facility]  Office: [phone]    -------------    Subjective  No acute events  Follows simple commands, variable mentation    Current FiO2 (%): 24 % Current PEEP: +5    Tracheal secretions: x3 large and thick    Current phase of ventilator weaning pathway:  Phase 1  Ventilator weaning results from yesterday: 11 hours on 8/5    Physical Exam:  Vitals:    06/17/21 0411 06/17/21 0751 06/17/21 0840 06/17/21 1237   BP:  147/83     Patient Position:  Semi-barraza     Pulse: 73 69 76 75   Resp: 21 24  25   Temp:  98.6  F (37  C)     TempSrc:  Oral     SpO2: 96%  97% 97% 100%   Weight:       Height:         GEN: NAD, resting in bed  HEENT: MMM, trach in place  CVS: regular rhythm, no murmurs  RESP: Rhoncherous in anterior lung fields  ABD: Soft, No abdominal pain with palpation   SKIN: Warm extremities, no visible rashes  EXT: warm, no edema  NEURO:  Grossly intact    Pertinent Labs:   Lab Results: personally reviewed.     Pertinent Radiology:  6/14/21 CXR:  The tracheostomy projects within the tracheal air column. Trachea is midline and central airways are patent.     The lungs are symmetrically inflated. Focal opacity in the medial left base is typical location for subsegmental atelectasis. No interstitial thickening or airspace opacities elsewhere in the lungs.     No pleural fluid or pneumothorax.

## 2021-06-26 NOTE — PLAN OF CARE
Problem: Daily Care  Goal: Daily care needs are met  Outcome: Progressing     Problem: Urinary Incontinence  Goal: Perineal skin integrity is maintained or improved  Outcome: Progressing     Problem: Potential for Compromised Skin Integrity  Goal: Skin integrity is maintained or improved  Outcome: Progressing     Vitals stable. Total care with ADL`s. Turned and repositioned q 2 hours. Rectal pouch changed and condom cath applied, primofit kept leaking. Patient shaved and bathed. Bilateral mitts on to prevent pulling on tubes. Patient suctioned for large amounts of yellow tan secretions.

## 2021-06-26 NOTE — CONSULTS
PALLIATIVE CARE CONSULT NOTE      Consult Note - Palliative Care  Dayron Neri,  1950, MRN [id number]    Admitting Dx: Respiratory failure due to intracranial hemorrhage    PCP: Provider, No Primary Care, [phone]   Code status:  Full Code       Extended Emergency Contact Information  Primary Emergency Contact: HOLLY NERI  Home Phone: [phone]  Mobile Phone: [phone]  Relation: Spouse     Referring Provider: Dr. Knox  Reason for Palliative Care Referral:      GOC/PSYCHOSOCIAL/EMOTIONAL/SPIRITUAL SUPPORT     Recommendations & Counseling       1. Goals of care Summary  (please see below for discussion)     Restorative for now     Code status: Full Code:.      2. Advanced Care Planning    Patient has a completed Health Care Directive: No    Surrogate decision maker if no appointed agent: Wife Holly    I will address this more once a palliative Relationship established     3.   Symptom Management     -Dyspnea related to:   Acute hypoxic resp failure, initially oost op and  later complicated by pneumonia.  Comment: on Phase 1 weaning trial      Recommendations:      Per pulmonary team       ac 14, 450, peep 5, 30%  CPAP today siince 8:20, still on CPAP at 4pm. He weaned yesterday for 13 hours and was placed back on the vent for rest    -Generalized weakness/fatigue- multifactorial in setting of  chronic co morbids.   Comment: Oropharyngeal dysphagia: On tube feeding via PEG tube, dietitian, speech following  Recommendations,       -PT/OT when tolerated-      -Assist with repositioning for comfort and to maintain skin integrity.       4. Psychosocial support   Comment: I explained our PC SW support and she is open to this  Recommendations:    Will ask Josephine to call her      Assessments   Dayron Neri is a  70 y.o. old male with past medical history of hypertension who presented to [facility] ED on 5/15/2021 for altered mental status. He had a fall and was found unresponsive by his  wife. He was found to have acute subarachnoid hemorrhage. He was transferred to [facility] and    He underwent left external ventricular drain placement.  Angiogram confirmed ruptured posterior communicating aneurysm and he  underwent craniotomy with clipping of PCOM aneurysm.  On 5/28/2021, patient had persistently elevated TCD, CT head showed bilateral BAYLEE infarct.  Next day, for persistent TCD and CTA showing bilateral BAYLEE vasospasm, was treated with milrinone and verapamil.  On 6/1/2021, angiogram showed no evidence of vasospasm.  He was extubated on 6/3/2021.On 6/6/2021, patient was reintubated due to drop in his saturation.  On 6/7/2021, patient underwent tracheostomy and PEG tube placement.  On 6/9/2021, EVD was removed.      Admitted to [facility] 6/11.    Interim Hx  6/14, pt developed some fatigue for which a CT head was done which showed slight increase in caliber of lateral and third ventricle  with possibility of developing communicating hydrocephalus. [facility] [place]  Did not feel it was much change and repeated CT 6/16 and no new changes.  Neuro:  6/17  He has been weaning from the ventilator a bit, and presently is on CPAP. On Saturday he was more responsive, he was following commands nicely with occupational therapy.   A bit more awake and responsive today which is reassuring.  CT yesterday showed no acute issues,     OT: participating in therapy  Recent Inpatient admissions last 12 months    Chart review/discussion with unit or clinical team members:        Interval History:      Current Status:  He is on CPAP and speaking valve just placed.  Patient able to say words and speak 5 clear words to me and Speech therapist.  No significant change with breathing while talking     PALLIATIVE CARE ASSESSMENT     Mood, coping, and/or meaning in the context of serious illness were addressed today: No.     Key Palliative Symptom Data:  # Pain severity in the last 12 hours: none  # Dyspnea severity in the  last 12 hours: none  # Nausea severity in the last 12 hours: none  # Anxiety severity in the last 12 hours: none    Status Prior to Admission  -Living situation:  lives with wife in split level home with 7 stairs up to bed/bath; 3 stairs to front door, 5 stairs down to basement, home in [place]    -Marital Status/Children:  . Wife and daughter have been on IPAD  -Baseline Function:  He was an  and retired then worked at [employer] for 10 years.  Retired Dec 2018.   Him and his wife had planned a trip to [place] and Covid hit.    Primary Supports: Wife Sandip, daughter Yolanda Epps and son-in-law Juan Jain shared that she has strong family and friend supports in place  -Spiritual Hx:  Synagogue  -Patient's Understanding: .-Patient's understanding of current medical condition: Unable to assess due to current mental status.       - Family's Understanding:  Sandip appears to have a thorough understanding of his current condition, disease trajectory, prognosis, treatment options and medications.  - Family   Concerns: Sandip states she is giving him til December to see how is improvement looks     GOALS OF CARE DISCUSSION:     Patient's/family's goals of care Discussion:   I spoke with sandip to introduce the Palliative Care team and services we provide; such as symptom management, assistance navigating chronic illness and goals for treatment, counseling, psychosocial and spiritual support.  We discussed patient's health history and current condition.  She is hoping that patient can get better and she understands that patient has had a lot of serious issues over the last months but is hoping for the best.   I discussed the importance of rehab and explained that this will be a journey but he is looking good and this will take time to recover.      I did confirm that goals are  restorative and she  would want to continue all current treatments.    I encouraged her to get on the IPAD now as  she hadn't been on since this morning. I explained that she will be pleasantly surprised to hear how well he talks.  I indicated we can talk further so she can call him now as I don't know how long the speaking valve will be in today due to weaning.    I gave her my number and explained that our team (Maximus Cummins and myself) rotate to LTAC and she will hear from all of us and Josephine arora SW.  She welcomes the calls          Code status :         FULL CODE  Not addressed today    -Decision making Capacity:  Due to fatigue and weakness, The patient does not currently   have decision-making capacity, nor can he   demonstrate the understanding of relevant information about the condition, the available test and treatment options, use of reason to make a decision concerning these issues, and communicate choice. (GEORGE 306, 4:420-4).                 Review of Systems:      Besides above, an additional system ROS was reviewed and is unremarkable          Medications:      I have reviewed this patient's medication profile and medications during this hospitalization.       Data Reviewed:  LAB:  Results from last 7 days   Lab Units 06/16/21  0639 06/15/21  0600 06/14/21  0734   LN-WHITE BLOOD CELL COUNT thou/uL 6.7 8.1 12.5*   LN-HEMOGLOBIN g/dL 8.2* 8.6* 9.4*   LN-HEMATOCRIT % 26.5* 28.4* 30.7*   LN-PLATELET COUNT thou/uL 250 288 372        Results from last 7 days   Lab Units 06/17/21  0449 06/16/21  0639 06/15/21  1705 06/15/21  0600 06/14/21  1450   LN-SODIUM mmol/L 143 142 145 147* 157*   LN-POTASSIUM mmol/L 3.9 4.0 4.2 3.4*  --    LN-CHLORIDE mmol/L 106 105  --  108*  --    LN-CO2 mmol/L 29 30  --  30  --    LN-BLOOD UREA NITROGEN mg/dL 29* 28*  --  35*  --    LN-CREATININE mg/dL 0.69* 0.67*  --  0.69*  --    LN-CALCIUM mg/dL 9.5 9.4  --  9.6  --    LN-ALBUMIN g/dL  --   --   --   --  2.1*   LN-PROTEIN TOTAL g/dL  --   --   --   --  7.7   LN-BILIRUBIN TOTAL mg/dL  --   --   --   --  0.4   LN-ALKALINE PHOSPHATASE U/L  --    --   --   --  125*   LN-ALT (SGPT) U/L  --   --   --   --  59*   LN-AST (SGOT) U/L  --   --   --   --  33           Lab Results   Component Value Date/Time    ALBUMIN 2.1 (L) 06/14/2021 02:50 PM     Wt Readings from Last 3 Encounters:   06/16/21 138 lb 4.8 oz (62.7 kg)       RADIOLOGIC FINDINGS:  Postoperative period was complicated by vasospasm, bilateral BAYLEE infarct. MRI done on 5/28 showed right frontal parietal, temopral and left parasagittal parietal lobes infarcts.  On 6/1/2021, angiogram showed no vasospasm.          Physical Exam:       Temp:  [98.6  F (37  C)-99.1  F (37.3  C)] 98.6  F (37  C)  Heart Rate:  [69-77] 75  Resp:  [16-25] 25  BP: (135-158)/(80-90) 147/83  FiO2 (%):  [24 %-30 %] 24 %    General Appearance: Alert Awake, slow response to simple commands, generalized weakness. Able to talk with speaking valve.  Knows his name      Head Normocephalic, without obvious abnormality, atraumatic     Ears/Eyes/Nose/Throat Eyes: negative findings: lids and lashes normal. conjunctivae normal  Nose: no discharge  Throat: lips, mucosa, and tongue normal; teeth and gums normal       Neck: no JVD and symmetrical, trachea midline   Chest: The spine is straight and the chest is symmetric   Lungs: Respirations are unlabored; the lungs are clear  to auscultation. O2 delivery by  Trach to vent (CPAP setting)  decreased in bases   Cardiovasular: Auscultation reveals normal first and second heart sounds with no murmurs, rubs, or gallops   Abdomen: Nl in size,  bowel sounds are present   Extremities: SANDERS no edema   Neurologic: Mood and affect are  flat   Skin: Skin warm et dry     Ambar Ortiz  MSN, NP-C, APRN, ACHPN  [facility]  Palliative Medicine    To contact via TextPage: Click here  Palliative Care Office #:[phone]       Billing Information:      Information gathered today from: patient, family/loved ones, medical team members, unit team members and chart review in Epic    E/M level: I have  personally spent a total time of 110 minutes today on the unit in  review of medical record, consultation with the medical providers and assessment of patient today, with more than 50% of time spent on counseling, coordination of care and discussion re: diagnostic results, prognosis, symptom management, risks and benefits of management options and development of plan of care.    Total unit floor time: Reviewed record, bedside exam, placed orders, reviewed  lab and and XR, documentation.         I  met with RT, speech,  RN, tried to page MD,  spoke to  family

## 2021-06-26 NOTE — PLAN OF CARE
Problem: Pain  Goal: Patient's pain/discomfort is manageable  Outcome: Progressing     Problem: Daily Care  Goal: Daily care needs are met  Outcome: Progressing     Problem: Psychosocial Needs  Goal: Demonstrates ability to cope with hospitalization/illness  Outcome: Progressing     Problem: Psychosocial Needs  Goal: Collaborate with patient/family/caregiver to identify patient specific goals for this hospitalization  Outcome: Progressing   Awake, not engage or interactive, vent-Cpap, then now on TM.Trach secretions is improving, less suction this shift. Up in the chair.  Wife /daughter upset about some cares and therapy schedules. Explained the flexibility and OT came early to do therapy and family happy about it.   Petr Patton, RN

## 2021-06-26 NOTE — PLAN OF CARE
Patient denied pain so far this shift. Still has bilateral wrist restraint on due to unsafe behavior. Will continue to monitor.

## 2021-06-26 NOTE — PLAN OF CARE
Problem: Mechanical Ventilation  Goal: Patient will maintain patent airway  Outcome: Progressing  Goal: Tracheostomy will be managed safely  Outcome: Progressing  Goal: Respiratory status - ventilation  Description: Movement of air in and out of the lungs.    Liberate from ventilator  Outcome: Progressing   RT PROGRESS NOTE     DATA:     CURRENT SETTINGS:  PRVC/AC 14, 450, +5,              TRACH TYPE / SIZE: # 6 SHILEY DCT  Placed  On 6/11/21             MODE:  PRVC/AC             FIO2:  45%     ACTION:             THERAPIES: Duoneb q6hr              SUCTION:                           FREQUENCY: x4                        AMOUNT: moderate                        CONSISTENCY: thick/thin                        COLOR: Pale Yellow              SPONTANEOUS COUGH EFFORT/STRENGTH OF EFFORT (not elicited by suctioning): good                              WEANING PHASE: 1                        WEAN MODE:                         WEAN TIME:                          END WEAN REASON:       RESPONSE:             BS:  Coarse             VITAL SIGNS: sat 89-97%, HR , RR 20-35             EMOTIONAL NEEDS / CONCERNS:  no             RISK FOR SELF DECANNULATION:  may be                        RISK DUE TO:                          INTERVENTION/S IN PLACE IS/ARE:  mittens in place      NOTE / PLAN: Pt is currently resting on full support, FIO2 titrated to 45% from 35% due to desaturation to 89%. Will start weaning on phase 1 in AM

## 2021-06-26 NOTE — PROGRESS NOTES
Clinical Nutrition Therapy Follow Up Note    RD talked to MD today, who reports that the pt's wife is anxious about his weight loss.     RD talked to his wife and explained the current nutrition intervention and changes that will be made to it.    Pt is currently on phase 1 weaning trial.     Per [facility] RD note on 6/10, he received 130 g protein per day with three packets NC ProSource in addition to Osmolite 1.5 at 60 mL/hour.     Current Nutrition Prescription:   Diet: TF, no tray with Osmolite 1.5 at 65 mL/hour and 400 mL FWF q 4 hours  MD is managing flushes.    Current Nutrition Intake:  Pt has a PEG tube which was placed 6/7/21.    Current TF regimen provides 1560 mls, 2340 kcals, 98 grams protein, 0 grams fiber, 318 grams carbohydrate, 1189 mls free water from formula (total 3589 mL between formula and FWF).    SLP saw pt today and recommends speaking-valve trials.    Anthropometrics:  Admission weight: 145 lb per RD note on 6/12  Weight: 138 lb 4.8 oz (62.7 kg)-bed  The pt's wife reports that he was underweight even before being admitted to [facility]. He walked 2-3 miles a day and was not a big eater; it was a struggle for him to gain weight.    Per chart he weighed 156 lb 6/7/21 and 151 lb 6/11/21.     GI Status/Output:   BM x2 6/15 and 1 so far today, noted to be loose.  BMs have been loose or watery the last few days.  Recent RD note reports that loose stools are likely due to antibiotics.    Skin/Wound:  Per [facility] note 6/14, Coy Score is 13. Scalp incisions noted.    Medications:  Pepcid, culturelle, merrem, K replacement    Labs:  Na now WNL  K now WNL  BUN 28-improving  Cr 0.67-worse  , 86, 118    Estimated Nutrition Needs (adjusted 6/16 to reflect current wt):  Assessment weight is 63 kg, current weight (80% IBW of 172 lb)     Energy Needs: 0378-2426 kcals daily, 40-45 kcal/kg (he is currently receiving 37 kcals/kg and still losing wt)  Protein Needs: + mg daily, 1.5-2+ g/kg   Fluid Needs:  9704-7429 mls daily, 25-30 mls/kg     Malnutrition: Noted previously-moderate    Nutrition dx:   Swallowing difficulty r/t respiratory failure as evidenced by requiring enteral nutrition-not progressing    Goal Status:  Meet estimated nutrition needs via TF-not met  Maintain weight-not met-weight continues to decrease   Advance diet-not progressing    Intervention:  To prevent further weight loss, RD will increase TF rate to 75 mL/hour; continue using Osmolite 1.5. Add one packet NC ProSource per day to better meet protein needs and change FWF to 370 mL q 4 hours.  This regimen with modular will provide 1800 mL formula, 2760 kcals, 128 g protein, 367 g CHO, 88 g fat, 0 g fiber, 1372 mL fluid (total 3592 kcals between formula and flushes).    RD contacted pt's RN and requested that an updated weight be taken today to ensure accuracy. Even if his weight really is ~145 lb, he is still only at 84% of his IBW at that weight, so increased nutrition intake is warranted.    Monitoring/Evaluation:  TF tolerance, weight, labs, stooling, ability to advance diet    Odette Darby RD, LD

## 2021-06-26 NOTE — PLAN OF CARE
Problem: Mechanical Ventilation  Goal: Patient will maintain patent airway  6/13/2021 0611 by Hans Goyal LRT  Outcome: Progressing  Goal: Tracheostomy will be managed safely  6/13/2021 0611 by Hans Goyal LRT  Outcome: Progressing  Goal: Respiratory status - ventilation  Description: Movement of air in and out of the lungs.    Liberate from ventilator  Outcome: Progressing    RT PROGRESS NOTE     DATA:     CURRENT SETTINGS: Vent setting RR 14,  l, peep 5             TRACH TYPE / SIZE:  Shiley # 6 place on 6-7-21             MODE:   PRVC/AC             FIO2:  35%     ACTION:             THERAPIES:   Duo-neb three times a day              SUCTION:                           FREQUENCY:  x 5                        AMOUNT:Moderate                         CONSISTENCY:  Thin                         COLOR:  Pale yellow              SPONTANEOUS COUGH EFFORT/STRENGTH OF EFFORT (not elicited by suctioning): Yes/Strong                               WEANING PHASE:   1                        WEAN MODE:    CPAP/PS  Days as tolerated                         WEAN TIME:   8 hrs total 2 hrs on PS 12 and 6 hrs on PS 10                        END WEAN REASON: RR distress      RESPONSE:             BS:  Coarse              VITAL SIGNS:   SpO2 95-98%, HR 76-89, RR  19-24             EMOTIONAL NEEDS / CONCERNS:  None                 RISK FOR SELF DECANNULATION:                          RISK DUE TO:                          INTERVENTION/S IN PLACE IS/ARE:    n/a      NOTE / PLAN:              Pt on full vent support comfortable with no RR distress. Pt has oozing Yellow/white secretions needed frequent trach cares . Wound way below stoma at chest site RN treating wound .

## 2021-06-26 NOTE — PROGRESS NOTES
"[facility]    Assessment and Plan:    Lethargy     Recent history significant for subarachnoid hemorrhage, BAYLEE strokes, temporary extraventricular drain.  Last CT scan was just prior to transfer here to [facility].    CT repeated yesterday, this shows no new stroke or hemorrhage, but concern for slight increase in ventricular size.  We will repeat CT scan later this week for monitoring.     Sodium doing better today.  Antibiotics started for possible pneumonia.     Discussed with wife via the video tablet here in the room.    Subjective:     Dayron is a 70-year-old man seen at [facility] LT[facility] unit. He has a complex neurologic history recently. He initially presented on May 15 after he was found unresponsive by his wife. Evaluation showed acute subarachnoid hemorrhage. He had left extraventricular drain placed. 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 Subsequently he had elevated velocities on transcranial Dopplers and developed anterior cerebral artery infarcts, right worse than left. He failed extubation and was reintubated, he had trach and PEG placed. Extraventricular drain was removed. He is now transferred here to [facility] for further management. He has been weaning from the ventilator a bit, and presently is on CPAP. On Saturday he was more responsive, he was following commands nicely with occupational therapy.    6-15-21: A bit more awake and responsive today which is reassuring.  CT yesterday showed no acute issues, concern raised for increased ventricular size    Objective:  /81 (Patient Position: Lying)   Pulse 70   Temp 98.6  F (37  C) (Axillary)   Resp 13   Ht 5' 11\" (1.803 m)   Wt 145 lb 11.2 oz (66.1 kg)   SpO2 99%   BMI 20.32 kg/m       Opens eyes to voice today  He does follow some simple commands  Tongue protrudes in the midline  After I passively open his eyes he does keep them open for " just a little bit.  He moves both arms quite well, and resists my moving them.  Gaze is conjugate, no nystagmus  Neck is supple, no bruits  Tone is a little increased throughout     LFTs are okay, ammonia is normal.    Scheduled Meds:    famotidine  20 mg Enteral Tube BID     heparin (PF) ANTICOAGULANT  5,000 Units Subcutaneous Q8H FIXED TIMES     ipratropium-albuteroL  3 mL Nebulization Q6H - RT     Lactobacillus rhamnosus GG  1 capsule Enteral Tube Daily with brkfst     lisinopriL  20 mg Enteral Tube Daily     meropenem (MERREM) IVPB in 100mL  2 g Intravenous Q8H     nystatin  5 mL Swish & Swallow QID     Continuous Infusions:    dextrose 5% 75 mL/hr (06/15/21 1157)     PRN Meds:.acetaminophen **OR** acetaminophen, alteplase, dextrose 50 % (D50W), glucagon (human recombinant), hydrALAZINE, HYDROmorphone, naloxone **OR** naloxone **OR** naloxone **OR** naloxone, sodium chloride 0.9%     Jimmie Gaona MD

## 2021-06-26 NOTE — PROGRESS NOTES
Speech Language/Pathology  Speech Therapy Daily Progress Note    Patient presents as lethargic during this session. Patient's wife present via iPad  An  was not applicable.    Objective    Speaking valve trial: PMV was initiated and trialed this session to monitor patient's ability to tolerate one way valve with cuff deflation as relates to phonation and equalization of subglottic pressure.  Speaking valve trial for 25 minutes was tolerated. No coughing noted initially, RT reported coughing ~1-2 hrs after and resolved with suction. Vocal quality was audible and only observed x1. Respiratory rate was WNL. Normal breathing pattern observed. O2 sats ranging between 92-96, on HF. No back pressure noted. Speaking valve was left on at end of session. Handoff with Respiratory Therapist Yoly completed via [employer].    Verbal Expression: To address impaired ability to verbally express wants, needs and ideas impacting meaningful conversation and effective interpersonal interactions.  -single word repetition: NR. Pt did repeat single phoneme x1 with max cues.   -automatic speech (1-5): NR    Auditory Comprehension:To improve ability to comprehend basic information related to basic needs and environment.  -1-step: Pt followed x3 OM commands with moderate cues. Waved at his wife on the iPad several times.  -y/n: NR    Assessment    Patient had fluctuating alertness throughout session. He continued to attempt to participate when given verbal and/or tactile cues.     Plan/Recommendations  POC. BSS if tolerating PMV and alert    The ST Care Plan has been reviewed and current plan remains appropriate.    30 speech/language minutes     Katalina Joseph MA, CCC-SLP

## 2021-06-26 NOTE — PROGRESS NOTES
[facility] Pulmonary Medicine - Progress Note    Assessment:  Principal Problem:    SAH (subarachnoid hemorrhage) (H)  Active Problems:    Acute respiratory failure with hypoxia (H)    ESBL (extended spectrum beta-lactamase) producing bacteria infection    70 y.o. 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 Course c/b bilateral BAYLEE infarct with vasospasm. Extubated on 6/3/2021 and re-intubated on 6/6/2021 due to hypoxemia. On 6/7/2021, patient underwent tracheostomy and PEG tube placement. Treated for klebsiella and pseudomonal VAP, ESBL.    Recommendations/Plans:    Continue phase 1 weans, making progress    Continue duonebs    Completing course of cefepime, currently no s/s of acute infection    Trach: 6 Malcolm, placed 6/7/21. Trach change/downsize at 10-14 days, depending on progress with PS weaning    SLP consulted. Mentation, confusion remains an issue    6/12 BDT - no immediate, recommended supervised in-line PMV      Discussed my impressions and recommendations for care with RT.      Jacklyn Howard MD  Pulmonary and Critical Care Medicine  [facility]  Office: [phone]    -------------    Subjective  In wheelchair, doing PS wean  No acute changes    Current FiO2 (%): 30 % Current PEEP: +5    Tracheal secretions: Copious    Current phase of ventilator weaning pathway:  Phase 1  Ventilator weaning results from yesterday: 11 hours on 10/5    Physical Exam:  Vitals:    06/16/21 0751 06/16/21 1211 06/16/21 1454 06/16/21 1532   BP:    141/80   Patient Position:    Sitting   Pulse: 76 67  73   Resp: 23 20  22   Temp:    98.7  F (37.1  C)   TempSrc:    Oral   SpO2: 98% 100% 99% 98%   Weight:       Height:         GEN: NAD, in wheelchair, weaning  HEENT: MMM, trach in place  CVS: regular rhythm, no murmurs  RESP: Rhoncherous in anterior lung  fields  ABD: Soft, No abdominal pain with palpation   SKIN: Warm extremities, no visible rashes  EXT: warm, no edema  NEURO:  Grossly intact    Pertinent Labs:   Lab Results: personally reviewed.     Pertinent Radiology:  6/14/21 CXR:  The tracheostomy projects within the tracheal air column. Trachea is midline and central airways are patent.     The lungs are symmetrically inflated. Focal opacity in the medial left base is typical location for subsegmental atelectasis. No interstitial thickening or airspace opacities elsewhere in the lungs.     No pleural fluid or pneumothorax.

## 2021-06-26 NOTE — PLAN OF CARE
Problem: Mechanical Ventilation  Goal: Patient will maintain patent airway  Outcome: Progressing  Goal: Tracheostomy will be managed safely  Outcome: Progressing  Goal: Respiratory status - ventilation  Description: Movement of air in and out of the lungs.    Liberate from ventilator  Outcome: Progressing     RT PROGRESS NOTE     DATA:     CURRENT SETTINGS: Vent setting RR 14,  l, peep 5             TRACH TYPE / SIZE:  Shiley # 6 place on 6-7-21             MODE:   PRVC/AC             FIO2:  35%     ACTION:             THERAPIES:   Duo-neb three times a day              SUCTION:                           FREQUENCY:  x 5                        AMOUNT:Moderate                         CONSISTENCY:  Thin                         COLOR:  Pale yellow              SPONTANEOUS COUGH EFFORT/STRENGTH OF EFFORT (not elicited by suctioning): Yes/Strong                               WEANING PHASE:   1                        WEAN MODE:    CPAP/PS  Days as tolerated                         WEAN TIME:  08:00 AM TO 19:21 11 hrs 21 minutes.                        END WEAN REASON:  Tired /discomfort.     RESPONSE:             BS:  Coarse              VITAL SIGNS:   SpO2 95-98%, HR 76-89, RR  19-24             EMOTIONAL NEEDS / CONCERNS:  None                 RISK FOR SELF DECANNULATION:                          RISK DUE TO:                          INTERVENTION/S IN PLACE IS/ARE:    n/a      NOTE / PLAN:       Plan resume CPAP/PS wean in am. Pt still need frequent Trach site care due to oozing drainage/secrtetions resembles tube feeding , has no odor. RN will pass it on to Wound Care to check sample culture Trach stoma area .

## 2021-06-26 NOTE — PLAN OF CARE
RT PROGRESS NOTE     DATA:     CURRENT SETTINGS:             TRACH TYPE / SIZE:  6 Shiley placed 6-7-21.              MODE:   PRVC 14/450/+5/24%                  ACTION:             THERAPIES:   Q6 Duo, two times a day Saline nebs given. Q8 Tobramycin neb given x 1. Med was late coming up to the floor. Will be back on schedule at 0800 Friday.             SUCTION:                           FREQUENCY:   X 3                        AMOUNT:   small x 1, large x , copious x 1                        CONSISTENCY:   thick                        COLOR:   white x 2, pale yellow x 1             SPONTANEOUS COUGH EFFORT/STRENGTH OF EFFORT (not elicited by suctioning): Strong/Productive                              WEANING PHASE:   2                        WEAN MODE:    35% @ 30 LPM HFTM with cuff up                        WEAN TIME:   [id number] Thursday, 6/24/21, for 15 hours and 20 minutes. No signs of shortness of breath or WOB at the end. BS: Clear and dim with a few, faint rhonchi post sxn, SATs 97%, HR 82, RR 20                        END WEAN REASON:   HS     RESPONSE:             BS:   Clear and dim to clear and dim with a few, faint rhonchi             VITAL SIGNS:   SATs 92-98%, HR 75-83, RR 20-28             RISK FOR SELF DECANNULATION:  Yes             RISK DUE TO:  Confusion             INTERVENTION/S IN PLACE IS/ARE:  Bilateral soft wrist restraints.       NOTE / PLAN:   Continue with same     Problem: Mechanical Ventilation  Goal: Patient will maintain patent airway  Outcome: Progressing  Goal: Tracheostomy will be managed safely  Outcome: Progressing  Goal: Respiratory status - ventilation  Description: Movement of air in and out of the lungs.    Liberate from ventilator  Outcome: Progressing

## 2021-06-26 NOTE — PROGRESS NOTES
[facility] Pulmonary Medicine - Progress Note  6/23/2021    Assessment:  Principal Problem:    SAH (subarachnoid hemorrhage) (H)  Active Problems:    Acute respiratory failure with hypoxia (H)    ESBL (extended spectrum beta-lactamase) producing bacteria infection    Attention to tracheostomy (H)    Acute and chronic respiratory failure with hypoxia (H)    On mechanically assisted ventilation (H)    Encounter for palliative care    Encephalopathy    70 y.o. 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 Course c/b bilateral BAYLEE infarct with vasospasm. Extubated on 6/3/2021 and re-intubated on 6/6/2021 due to hypoxemia. On 6/7/2021, patient underwent tracheostomy and PEG tube placement. Treated for klebsiella and pseudomonal VAP, ESBL.    Recommendations/Plans:    Continue phase 2 weans    Continue duonebs    Completed cefepime course    Trach: 6 Shiley, placed 6/7/21, no acute need to down-size the trach since he is tolerating PMV w/o issues    SLP following, appreciate recommendations    6/12 BDT - no immediate aspiration, doing supervised in-line PMV    Discussed my impressions and recommendations for care with RT.    Nia Bains MD  Pulmonary and Critical Care      -------------    Subjective  No acute events overnight. Visit shortened due to patient undergoing an EEG. Multidisciplinary notes reviewed.    Current FiO2 (%): 30 % Current PEEP: +5    Tracheal secretions: frequent, moderate to large    Current phase of ventilator weaning pathway:  Phase 2  Ventilator weaning results from yesterday: 13 hours 18 minutes, of these PMV for 7 hours 52 minutes    Physical Exam:  Temp:  [98.1  F (36.7  C)-99.3  F (37.4  C)] 99.3  F (37.4  C)  Heart Rate:  [74-94] 83  Resp:  [18-28] 28  BP: (117-139)/(67-82) 117/67  FiO2 (%):  [24 %-30 %] 30 %    GEN: NAD, resting  in bed  HEENT: MMM, trach in place  CVS: regular rhythm, no murmurs  RESP: clear in anterior lung fields, rhonchi improved over the last 24 hours  ABD: Soft, No abdominal pain with palpation   SKIN: Warm extremities, no visible rashes  EXT: warm, no edema  NEURO:  Grossly intact    Pertinent Labs: Lab Results: personally reviewed.     Pertinent Radiology: personally reviewed, including Radiology interpretations (as below):  CXR, 6/18:  Tracheostomy tube in place. Left basilar opacities partially silhouetting the left hemidiaphragm are unchanged and again presumed to reflect some atelectasis. Right lung is clear. Heart and pulmonary vascularity are normal. Gastrostomy tube.

## 2021-06-26 NOTE — PROGRESS NOTES
"Palliative Care Social Work Note     Assessment  Initial call to wife Susy to introduce self and offer emotional support. She is struggling with \"not seeing much progress\" for Matt. She isn't sure what to expect re time frame to see any improvement. She shared he was interactive Sunday, but otherwise has been sleeping most of the time and rarely interacts. Also, when asked questions he typically gives the wrong information which is concerning for her. Right now she feels \"there isn't a lot of hope\" for him to improve and have good quality of life.   She feels  that he doesn't know who she is and that is hard for her. She worries that he will be in a nursing home the rest of his life, which is something he would not want. She is also concerned about the financial cost of nursing home care long term and shared that they don't have long term care insurance. Acknowledged all the unknowns she is needing to sit with. Assessed her coping. She keeps to herself, but does have support from her daughter and other family members. She has a strong elaina, but feels that \"prayer only helps so much.\" Matt has had visits from a  and . Susy wants to be hopeful, but also feels like she needs to be realistic about Matt's condition. She appreciated the opportunity to talk and welcomes continued calls/support.     Clinical Social Work Interventions   Active/empathetic listening  Facilitated processing of emotions  Validation of feelings   Emotional support     Plan of care  PC  continues to be available to provide psychosocial/emotional support to Susy.    Josephine Dailey, [facility]  Palliative Care   Office # [phone]  "

## 2021-06-26 NOTE — PLAN OF CARE
Problem: Pain  Goal: Patient's pain/discomfort is manageable  Outcome: Progressing     Problem: Daily Care  Goal: Daily care needs are met  Outcome: Progressing   Vital signs were stable. Pt. does not  appear to be in pain and slept most of the shift. TF is running at 75 ml/hr. Pt. is on restraints hand mitt X 2 for safety. Continue to monitor.   Margot Sargent RN       no

## 2021-06-26 NOTE — PROGRESS NOTES
Morning rounds- Patient on phase 1 of wean.  Hand mitts x2.  MARY KATE care management to continue to follow with discharge planning.  Care Progression meeting on Monday, June 21 at 1030 am.      MARY KATE Law [facility] 6/18/2021 12:34 PM

## 2021-06-26 NOTE — PROGRESS NOTES
Progress Note    Brief summary:   70 y.o. old male with past medical history of hypertension who presented to ED on 5/15/2021 for altered mental status. He had a fall and was found unresponsive by his wife. He was found to have acute subarachnoid hemorrhage.  On 5/15, he underwent left external ventricular drain placement.  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 One the same day, he also underwent craniotomy with clipping of PCOM aneurysm.  On 5/28/2021, patient had persistently elevated TCD, CT head showed bilateral BAYLEE infarct.  Next day, for persistent TCD and CTA showing bilateral BAYLEE vasospasm, was treated with milrinone and verapamil.  On 6/1/2021, angiogram showed no evidence of vasospasm.  He was extubated on 6/3/2021.On 6/6/2021, patient was reintubated due to drop in his saturation.  On 6/7/2021, patient underwent tracheostomy and PEG tube placement.  On 6/9/2021, EVD was removed.  Patient was initially started on Unasyn for possible pneumonia on 5/20/2021 which was switched to ceftriaxone the next day for sputum culture growing Klebsiella.  On 5/23, sputum culture also grew Pseudomonas for which ceftriaxone was switched to Zosyn.  Due to increased white blood cell counts, possibility of ventriculitis was raised however CSF was negative but antibiotic was switched to cefepime and vancomycin on 5/28/2021 with plan to continue for 2 weeks.  Vanco has been discontinued on 6/9/2021.  On 6/5/2021, sputum grew ESBL so cefepime was switched to meropenem which he is on currently. On 6/14, pt developed some fatigue for which a CT head was done which showed slight increase in caliber of lateral and third ventricle  with possibility of developing communicating hydrocephalus. This was discussed with Dr. Casillas (neurosurgeon at [facility]), who did not think there was much change, however suggested repeating CT head on Thursday morning (6/16/21) and call them back if any  change.    Assessment/Plan  Principal Problem:    SAH (subarachnoid hemorrhage) (H)  Active Problems:    Acute respiratory failure with hypoxia (H)    ESBL (extended spectrum beta-lactamase) producing bacteria infection    #Hypernatremia: due to dehydration. Total water deficit calculated to be 2L. Continue 400 ml water flushes every 4 hours.   On D5 @75 ml per hour. Continue to trend Na, recheck this PM    #Leucocytosis: No fever, likely from hemoconcentration with dehydration. Currently resolved.     #Acute hypoxic respiratory failure: Initially postoperatively but later complicated by pneumonia.  On mechanical ventilation, tracheostomy done on 6/7/2021.  Vent management per RT and pulmonology, appreciate input.  Currently on phase 1 weaning trial.     #Recent subarachnoid hemorrhage: Presented with fall and unresponsiveness on 5/15/2021.  S/p EVD, angiogram showing ruptured posterior communicating aneurysm followed by craniotomy with clipping of PCOM aneurysm on the same day.  EVD removed on 6/9/2021.  Postoperative period was complicated by vasospasm, bilateral BAYLEE infarct. MRI done on 5/28 showed right frontal parietal, temopral and left parasagittal parietal lobes infarcts.  On 6/1/2021, angiogram showed no vasospasm. Will need to follow up with Dr. Martinez in Neurosurgery clinic in 3 months with repeat CTA of head and neck for post-operative follow up     #Acute metabolic encephalopathy: likely due to bleed, infarcts.   CT head showed slight increase in caliber of lateral and third ventricle  with possibility of developing communicating hydrocephalus. This was discussed with Dr. Casillas (neurosurgeon at [facility]), who did not think there was much change, however suggested repeating CT head Thursday, 6/17.   Neurology following pt here, appreciate input.   On restraints- mittens.     #Hospital acquired pneumonia: Sputum cultures from prior hospitalization showed Klebsiella, Pseudomonas.  Patient has been on  antibiotics since 5/20/2021.  On 6/5/2021, was sputum grew ESBL for which patient is on meropenem, until 6/19/2021.     #Oropharyngeal dysphagia: On tube feeding via PEG tube, dietitian, speech following.  VFSS showed      #Hypertension: On lisinopril, dose increased to 20mg.     #Diarrhea: C diff checked at [facility] was negative. Likely abx associated diarrhea, resolved, rectal tube out    #Moderate malnutrition- RD following    #Hypokalemia - likely secondary to GI loss  -On K protocol, RN to manage    DVT ppx:Heparin  GI ppx:Famotidine    Diet: Tube feeding    Code: Full code    Subjective  Patient is new to me. Chart reviewed and events noted.   Patient seen and examined.  Pt sleeping, opens eyes briefly during exam and closes eyes again.      Objective    Vital signs in last 24 hours  Temp:  [97.4  F (36.3  C)-99.7  F (37.6  C)] 98.4  F (36.9  C)  Heart Rate:  [70-95] 70  Resp:  [20-27] 24  BP: (142-164)/(82-88) 143/82  FiO2 (%):  [30 %-35 %] 30 %  Weight:   145 lb 11.2 oz (66.1 kg)    Intake/Output last 3 shifts  I/O last 3 completed shifts:  In: 5301 [I.V.:1449; NG/GT:3852]  Out: 1750 [Urine:1750]  Intake/Output this shift:  No intake/output data recorded.    Physical Exam   General: Sleepy  Eyes: PERRL+, no pallor/scleral icterus  Chest: Clear to auscultation bilaterally  Heart: RRR  Abdomen: soft, non tender  Neuro: unable to assess      Meds    famotidine  20 mg Enteral Tube BID     heparin (PF) ANTICOAGULANT  5,000 Units Subcutaneous Q8H FIXED TIMES     ipratropium-albuteroL  3 mL Nebulization Q6H - RT     lisinopriL  10 mg Enteral Tube Daily     meropenem (MERREM) IVPB in 100mL  2 g Intravenous Q8H     nystatin  5 mL Swish & Swallow QID       Pertinent Labs   Lab Results: personally reviewed.     Results from last 7 days   Lab Units 06/15/21  0600 06/14/21  1450 06/14/21  0734 06/12/21  0522   LN-SODIUM mmol/L 147* 157* 158* 145   LN-POTASSIUM mmol/L 3.4*  --  3.9 3.9   LN-CHLORIDE mmol/L 108*  --   116* 105   LN-CO2 mmol/L 30  --  30 31   LN-BLOOD UREA NITROGEN mg/dL 35*  --  41* 27*   LN-CREATININE mg/dL 0.69*  --  0.77 0.70   LN-CALCIUM mg/dL 9.6  --  9.9 9.4         Results from last 7 days   Lab Units 06/15/21  0600 06/14/21  0734 06/13/21  0508 06/12/21  0522   LN-WHITE BLOOD CELL COUNT thou/uL 8.1 12.5*  --  8.3   LN-HEMOGLOBIN g/dL 8.6* 9.4*  --  8.8*   LN-HEMATOCRIT % 28.4* 30.7*  --  28.0*   LN-PLATELET COUNT thou/uL 288 372 394 391         Pertinent Radiology   Radiology Results: Personally reviewed impressions    Xr Chest 1 View Portable    Result Date: 6/14/2021  EXAM: XR CHEST 1 VIEW PORTABLE LOCATION: [facility] DATE/TIME: 6/14/2021 1:43 PM INDICATION: resp failure, pneumonia COMPARISON: Portable AP view of the chest 6/9/2021     The tracheostomy projects within the tracheal air column. Trachea is midline and central airways are patent. The lungs are symmetrically inflated. Focal opacity in the medial left base is typical location for subsegmental atelectasis. No interstitial thickening or airspace opacities elsewhere in the lungs. No pleural fluid or pneumothorax.     Ct Head Without Contrast    Result Date: 6/14/2021  EXAM: CT HEAD WO CONTRAST LOCATION: [facility] DATE/TIME: 6/14/2021 1:44 PM INDICATION: Follow-up intracranial hemorrhage. Lethargy. COMPARISON: CT head 06/11/2021 TECHNIQUE: Routine CT Head without IV contrast. Multiplanar reformats. Dose reduction techniques were used. FINDINGS: INTRACRANIAL CONTENTS: Right frontoparietal and pterional craniotomy changes with right paraclinoid aneurysm clip as seen previously. Slight interval decrease in residual mixed attenuation extra-axial fluid and blood products deep to the craniotomy flap which currently measures up to C6-C7 millimeters in maximum radial thickness compared to 8 mm previously along the anterior-inferior right frontal lobe. Small volume subarachnoid hemorrhage remains  within the posterior most aspect of the left sylvian fissure and adjacent parietal sulci, decreased since the previous exam. Small volume residual intraventricular hemorrhage layering within the temporal horns bilaterally. No definite new hemorrhage or enlarging extra-axial collection. Subacute infarct of the anterior parasagittal right frontal lobe. Small amount of parenchymal blood products along an old left frontal catheter tract, decreased since the prior exam. Mild lateral and third ventriculomegaly, slightly increased since the prior exam. For example, transverse diameter of the frontal horns measures up to 4 cm compared to 3.8 cm previously. VISUALIZED ORBITS/SINUSES/MASTOIDS: No intraorbital abnormality. Layering partially aerated secretions within the bilateral sphenoid sinuses, similar to the prior exam. Minor mucosal thickening inferior right maxillary sinus. No middle ear or mastoid effusion. BONES/SOFT TISSUES: No new calvarial defect.     1.  Slight interval increase in the caliber of the lateral and third ventricles without evidence for outflow obstruction. Developing communicating hydrocephalus is possible. Follow-up is advised. 2.  Multi compartmental intracranial hemorrhage with decreasing conspicuity of hyperattenuating blood products compared to 06/11/2021. No new hemorrhage or enlarging extra-axial collection identified. 3.  Subacute infarct of the parasagittal right frontal lobe.    Ct External Imaging Head    Result Date: 6/11/2021  Images were obtained from an external facility.  Click [employer] hyperlink to view images.  Textual results have been scanned into the media tab.    Ct External Imaging Head    Result Date: 6/11/2021  Images were obtained from an external facility.  Click [employer] hyperlink to view images.  Textual results have been scanned into the media tab.    Xr External Imaging    Result Date: 6/11/2021  Images were obtained from an external facility.  Click [employer]  hyperlink to view images.  Textual results have been scanned into the media tab.    Xr External Imaging Chest    Result Date: 6/11/2021  Images were obtained from an external facility.  Click [facility] Images hyperlink to view images.  Textual results have been scanned into the media tab.          Advanced Care Planning:  Discharge Planning discussed with patient's wife, Susy.  Anticipated LOS: TBD  Barrier to discharge: Acute issues  Disposition:TBD  Discussed care with patient for >35 minutes with greater than 50% of time spent in counseling and coordination of care.      Kim Knox MD  Hospitalist    This note was dictated using voice recognition software. Any grammatical or context distortions are unintentional and inherent to the software.

## 2021-06-26 NOTE — PROGRESS NOTES
[facility] Progress Note      Date of Service: 6/21/2021     Assessment and plan    Increase tracheal secretion  ; Was last seen by pulmonary on 6/18  ; Chest x-ray and pro-Cristi was negative  ; Still having significant secretions  : Sputum culture appears to be growing Pseudomonas: ?  Colonization versus infection  : Pulmonary follow-up    Acute metabolic encephalopathy  Considered multifactorial  ; Needing restraints  ; CT head on 6/14 show some possible increase in ventricular size however repeat CT on 6/16 did not show any acute changes  ; Was seen by neurology 6/18  ; Remain same    Recent subarachnoid hemorrhage:   Presented with fall and unresponsiveness on 5/15/2021.  S/p EVD, angiogram showing ruptured posterior communicating aneurysm followed by craniotomy with clipping of PCOM aneurysm on the same day.  EVD removed on 6/9/2021.    Postoperative period was complicated by vasospasm, bilateral BAYLEE infarct. MRI done on 5/28 showed right frontal parietal, temopral and left parasagittal parietal lobes infarcts.    On 6/1/2021, angiogram showed no vasospasm.   Will need to follow up with Dr. Martinez in Neurosurgery clinic in 3 months with repeat CTA of head and neck for post-operative follow up    Hypernatremia: due to dehydration.  : Resolved  ; Sodium 143-141    Leukocytosis  Resolved     Acute hypoxic respiratory failure:   Initially postoperatively but later complicated by pneumonia.  On mechanical ventilation, tracheostomy done on 6/7/2021.    Vent management per RT and pulmonology. Currently on phase 1 weaning.     Hospital acquired pneumonia: Sputum cultures from prior hospitalization showed Klebsiella, Pseudomonas.  Patient has been on antibiotics since 5/20/2021.  On 6/5/2021, was sputum grew ESBL for which patient is on meropenem, until 6/19/2021.     Oropharyngeal dysphagia:   On tube feeding via PEG tube, dietitian, speech consult. Video swallow 6/14     Hypertension: On lisinopril     Moderate  malnutrition     Hypokalemia - likely secondary to GI loss - resolved.  -On K protocol, RN to manage         This note was dictated using voice recognition software. Any grammatical or context distortions are unintentional and inherent to the software.  Subjective:   Patient seen at bedside, opens eyes    Updates from nursing staff: Still needing restraint, pulling staff    Spoke to later family at bedside  Family concerned about continued secretions    Spoke to RT  : Awaiting pulmonary follow-up      Brief History: 70 y.o. old male with past medical history of hypertension who presented to ED on 5/15/2021 for altered mental status. He had a fall and was found unresponsive by his wife. He was found to have acute subarachnoid hemorrhage.  On 5/15, he underwent left external ventricular drain placement.  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 One the same day, he also underwent craniotomy with clipping of PCOM aneurysm.  On 5/28/2021, patient had persistently elevated TCD, CT head showed bilateral BAYLEE infarct.  Next day, for persistent TCD and CTA showing bilateral BAYLEE vasospasm, was treated with milrinone and verapamil.  On 6/1/2021, angiogram showed no evidence of vasospasm.  He was extubated on 6/3/2021.On 6/6/2021, patient was reintubated due to drop in his saturation.  On 6/7/2021, patient underwent tracheostomy and PEG tube placement.  On 6/9/2021, EVD was removed.  Patient was initially started on Unasyn for possible pneumonia on 5/20/2021 which was switched to ceftriaxone the next day for sputum culture growing Klebsiella.  On 5/23, sputum culture also grew Pseudomonas for which ceftriaxone was switched to Zosyn.  Due to increased white blood cell counts, possibility of ventriculitis was raised however CSF was negative but antibiotic was switched to cefepime and vancomycin on 5/28/2021 with plan to continue for 2 weeks.  Vanco has been discontinued on 6/9/2021.  On 6/5/2021, sputum grew  "ESBL so cefepime was switched to meropenem which he is on currently.   He was transferred to [facility] on 6/11/21.     6/14/21 CT head done for fatigue- read as slight increase in caliber of lateral and third ventricle  With possibility of developing communicating hydrocephalus. Discussed with Dr. Casillas (neurosurgeon at [facility]), who did not think there was much change, however suggested repeating CT head on 6/16/21 which did not show any change.    Chart reviewed, events noted. Pt seen and examined.     Objective     Vital signs in last 24 hours:  Temp:  [97.7  F (36.5  C)-98.7  F (37.1  C)] 97.8  F (36.6  C)  Heart Rate:  [68-85] 85  Resp:  [18-28] 26  BP: (114-133)/(68-83) 114/68  FiO2 (%):  [24 %-33 %] 25 %  Weight:   147 lb 8 oz (66.9 kg)  Weight change:   Body mass index is 20.57 kg/m .    Intake/Output last 3 shifts:  I/O last 3 completed shifts:  In: 3559 [I.V.:3; NG/GT:3556]  Out: 1750 [Urine:1750]  Intake/Output this shift:  No intake/output data recorded.      Physical Exam:  /68 (Patient Position: Lying)   Pulse 85   Temp 97.8  F (36.6  C) (Oral)   Resp 26   Ht 5' 11\" (1.803 m)   Wt 147 lb 8 oz (66.9 kg)   SpO2 93%   BMI 20.57 kg/m      FiO2 (%): 25 % O2 Device: Trach mask O2 Flow Rate (L/min): 10 L/min    General Appearance:   Sitting up in bed, looks tired   HEENT:    Normocephalic. Pupils equal and reactive. No scleral   Icterus. Moist oral mucosa   Healed surgical scar on scalp   Lungs:     Clear to auscultation bilaterally, respirations unlabored   Heart::    Regular rate and rhythm, S1 and S2 normal, no murmur, rub   or gallop. No peripheral edema.   Abdomen:   Soft, Non tender. Non distended. Bowel sounds present.  G-tube in place   Extremity :   No deformity   Pulses:   2+ and symmetric all extremities   CNS:  Wakes up with movement otherwise falls back to sleep  No clear facial asymmetry         Scheduled Meds:    amino acids-protein hydrolys  1 packet Enteral Tube DAILY     famotidine  20 " mg Enteral Tube BID     heparin (PF) ANTICOAGULANT  5,000 Units Subcutaneous Q8H FIXED TIMES     insulin aspart (NovoLOG) injection   Subcutaneous Q6H FIXED TIMES     ipratropium-albuteroL  3 mL Nebulization Q6H - RT     Lactobacillus rhamnosus GG  1 capsule Enteral Tube Daily with brkfst     lisinopriL  40 mg Enteral Tube Daily     menthol-zinc oxide   Topical TID     Continuous Infusions:  PRN Meds:.acetaminophen **OR** acetaminophen, alteplase, dextrose 50 % (D50W), glucagon (human recombinant), hydrALAZINE, HYDROmorphone, naloxone **OR** naloxone **OR** naloxone **OR** naloxone, sodium chloride 0.9%, sodium chloride    Lab Results:  personally reviewed.   not applicable  Recent Results (from the past 24 hour(s))   POCT Glucose    Collection Time: 06/20/21 11:19 AM    Specimen: Blood   Result Value Ref Range    Glucose 130 70 - 139 mg/dL   POCT Glucose    Collection Time: 06/20/21  5:47 PM    Specimen: Blood   Result Value Ref Range    Glucose 117 70 - 139 mg/dL   POCT Glucose    Collection Time: 06/21/21 12:04 AM    Specimen: Blood   Result Value Ref Range    Glucose 135 70 - 139 mg/dL   POCT Glucose    Collection Time: 06/21/21  5:57 AM    Specimen: Blood   Result Value Ref Range    Glucose 135 70 - 139 mg/dL   Basic Metabolic Panel    Collection Time: 06/21/21  6:19 AM   Result Value Ref Range    Sodium 141 136 - 145 mmol/L    Potassium 4.0 3.5 - 5.0 mmol/L    Chloride 103 98 - 107 mmol/L    CO2 28 22 - 31 mmol/L    Anion Gap, Calculation 10 5 - 18 mmol/L    Glucose 131 (H) 70 - 125 mg/dL    Calcium 9.7 8.5 - 10.5 mg/dL    BUN 36 (H) 8 - 22 mg/dL    Creatinine 0.67 (L) 0.70 - 1.30 mg/dL    GFR MDRD Af Amer >60 >60 mL/min/1.73m2    GFR MDRD Non Af Amer >60 >60 mL/min/1.73m2   HM1 (CBC with Diff)    Collection Time: 06/21/21  6:19 AM   Result Value Ref Range    WBC 8.1 4.0 - 11.0 thou/uL    RBC 2.94 (L) 4.40 - 6.20 mill/uL    Hemoglobin 9.1 (L) 14.0 - 18.0 g/dL    Hematocrit 28.8 (L) 40.0 - 54.0 %    MCV 98 80 -  100 fL    MCH 31.0 27.0 - 34.0 pg    MCHC 31.6 (L) 32.0 - 36.0 g/dL    RDW 13.6 11.0 - 14.5 %    Platelets 284 140 - 440 thou/uL    MPV 10.8 8.5 - 12.5 fL    Neutrophils % 76 (H) 50 - 70 %    Lymphocytes % 13 (L) 20 - 40 %    Monocytes % 7 2 - 10 %    Eosinophils % 3 0 - 6 %    Basophils % 0 0 - 2 %    Immature Granulocyte % 0 <=0 %    Neutrophils Absolute 6.2 2.0 - 7.7 thou/uL    Lymphocytes Absolute 1.1 0.8 - 4.4 thou/uL    Monocytes Absolute 0.6 0.0 - 0.9 thou/uL    Eosinophils Absolute 0.2 0.0 - 0.4 thou/uL    Basophils Absolute 0.0 0.0 - 0.2 thou/uL    Immature Granulocyte Absolute 0.0 <=0.0 thou/uL     Results from last 7 days   Lab Units 06/21/21  0557 06/21/21  0004 06/20/21  1747 06/20/21  1119 06/20/21  0511 06/20/21  0013 06/19/21  1754 06/19/21  1329 06/19/21  1137 06/19/21  0515   LN-POC GLUCOSE FINGERSTICK- HE mg/dL 135 135 117 130 132 103 106 136 141* 114           Advance Care Planning:   Barriers to discharge: Multiple issues  Anticipated discharge day: Few days  Disposition: Depending on PT OT      Lori Jameson MD  [facility]  Hospitalist

## 2021-06-26 NOTE — PLAN OF CARE
Problem: Mechanical Ventilation  Goal: Patient will maintain patent airway  Outcome: Progressing  Goal: Tracheostomy will be managed safely  Outcome: Progressing  Goal: Respiratory status - ventilation  Outcome: Progressing     RT PROGRESS NOTE     DATA:   CURRENT SETTINGS:   PRVC 14, 450, +5, 24%             TRACH TYPE / SIZE:   #6 [place] DCT     ACTION:             THERAPIES:   Duoneb tx's Q6               SUCTION:   5-6x in 12hrs for sm-mod amts of py secretions. Good, spont cough.                WEANING PHASE:   2    Weaning started on vent w/ PS+8 & reji well for 3 hrs ([id number]) w/ spont RR~20, and spont Vt 480-400. Wean advanced to TM.    Advanced to TM (40L/30%) wean at 1050, is ongoing, & reji well. Plan to return pt to full vent support by HS.     RESPONSE:             BS:   Que.             VITAL SIGNS:   HR 71-76, RR 20-28, Sats 92-98%.             EMOTIONAL NEEDS / CONCERNS:   Minimal.                RISK FOR SELF DECANNULATION:   Yes.                        RISK DUE TO:   Confusion.                        INTERVENTION/S IN PLACE IS/ARE:    Bilat wrist restraints.       NOTE / PLAN:    Continue resp support as needed, monitor closely, & wean as tolerated.

## 2021-06-26 NOTE — PROGRESS NOTES
"[facility]    Assessment and Plan:    Lethargy     Recent history significant for subarachnoid hemorrhage, BAYLEE strokes, temporary extraventricular drain.  Last CT scan was just prior to transfer here to [facility].   CT Monday read as showing possible increased ventricular size, I recommended rpepat scan toward end of week.  CT repeated yesterday instead, no change.     Sodium doing better today.    UA was fine  Ammonia 29  hgb a bit low.    Continue supportive care. No new work up to recommend right now.       Subjective:     Dayron is a 70-year-old man seen at [facility] unit. He has a complex neurologic history recently. He initially presented on May 15 after he was found unresponsive by his wife. Evaluation showed acute subarachnoid hemorrhage. He had left extraventricular drain placed. 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 Subsequently he had elevated velocities on transcranial Dopplers and developed anterior cerebral artery infarcts, right worse than left. He failed extubation and was reintubated, he had trach and PEG placed. Extraventricular drain was removed. He is now transferred here to [facility] for further management. He has been weaning from the ventilator a bit, and presently is on CPAP. On Saturday he was more responsive, he was following commands nicely with occupational therapy.    6-15-21: A bit more awake and responsive today which is reassuring.  CT yesterday showed no acute issues, concern raised for increased ventricular size    6-17-21: no significant change from previous note    Objective:  /83 (Patient Position: Semi-barraza)   Pulse 75   Temp 98.6  F (37  C) (Oral)   Resp 25   Ht 5' 11\" (1.803 m)   Wt 138 lb 4.8 oz (62.7 kg)   SpO2 100%   BMI 19.29 kg/m       Opens eyes to voice today  He does follow some simple commands  Tongue protrudes in the midline  He moves both arms " quite well, and resists my moving them.  Gaze is conjugate, no nystagmus  Neck is supple, no bruits  Tone is a little increased throughout     LFTs are okay, ammonia is normal.    Scheduled Meds:    amino acids-protein hydrolys  1 packet Enteral Tube DAILY     famotidine  20 mg Enteral Tube BID     heparin (PF) ANTICOAGULANT  5,000 Units Subcutaneous Q8H FIXED TIMES     insulin aspart (NovoLOG) injection   Subcutaneous Q6H FIXED TIMES     ipratropium-albuteroL  3 mL Nebulization Q6H - RT     Lactobacillus rhamnosus GG  1 capsule Enteral Tube Daily with brkfst     lisinopriL  40 mg Enteral Tube Daily     meropenem (MERREM) IVPB in 100mL  2 g Intravenous Q8H     nystatin  5 mL Swish & Swallow QID     Continuous Infusions:    PRN Meds:.acetaminophen **OR** acetaminophen, alteplase, dextrose 50 % (D50W), glucagon (human recombinant), hydrALAZINE, HYDROmorphone, naloxone **OR** naloxone **OR** naloxone **OR** naloxone, sodium chloride 0.9%     Jimmie Gaona MD

## 2021-06-26 NOTE — H&P
Admission History and Physical   Dayron Neri,  1950, MRN [id number]    [facility]  Respiratory failure due to intracranial hemorrhage    PCP: No primary care provider on file., [unfilled], None   Code status:  Full Code       Extended Emergency Contact Information  Primary Emergency Contact: DAYRON NERI  Home Phone: [phone]  Mobile Phone: [phone]  Relation: Spouse       Assessment and Plan   #Acute hypoxic respiratory failure status: Initially postoperatively but later complicated by pneumonia.  On mechanical ventilation, tracheostomy done on 2021.  Vent management per RT and pulmonology    #Recent subarachnoid hemorrhage: Presented with fall and unresponsiveness on 5/15/2021.  S/p EVD, angiogram showing ruptured posterior communicating aneurysm followed by craniotomy with clipping of PCOM aneurysm on the same day.  EVD removed on 2021.  Postoperative period was complicated by vasospasm, bilateral BAYLEE infarct. MRI done on  showed right frontal parietal, temopral and left parasagittal parietal lobes infarcts.  On 2021, angiogram showed no vasospasm. Will need to follow up with Dr. Martinez in Neurosurgery clinic in 3 months with repeat CTA of head and neck for post-operative follow up    #Acute metabolic encephalopathy: likely due to bleed, infarcts. On restraints- mittens.    #Hospital acquired pneumonia: Sputum cultures from prior hospitalization showed Klebsiella, Pseudomonas.  Patient has been on antibiotics since 2021.  On 2021, was sputum grew ESBL for which patient is on meropenem, until 2021.    #Oropharyngeal dysphagia: On tube feeding via PEG tube, dietitian, speech consult    #Hypertension: On lisinopril    #Diarrhea: C diff checked at [place] was negative. Likely abx associated diarrhea.  On rectal tube, will remove once diarrhea is better    Active Problems:    * No active hospital problems. *      DVT Prophylaxis: Lovenox     Reason  for transfer  vent weaning     HPI:    Dayron Neri is a 70 y.o. old male with past medical history of hypertension who presented to ED on 5/15/2021 for altered mental status. He had a fall and was found unresponsive by his wife. He was found to have acute subarachnoid hemorrhage.  On 5/15, he underwent left external ventricular drain placement.  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 One the same day, he also underwent craniotomy with clipping of PCOM aneurysm.  On 5/28/2021, patient had persistently elevated TCD, CT head showed bilateral BAYLEE infarct.  Next day, for persistent TCD and CTA showing bilateral BAYLEE vasospasm, was treated with milrinone and verapamil.  On 6/1/2021, angiogram showed no evidence of vasospasm.  He was extubated on 6/3/2021.On 6/6/2021, patient was reintubated due to drop in his saturation.  On 6/7/2021, patient underwent tracheostomy and PEG tube placement.  On 6/9/2021, EVD was removed.  Patient was initially started on Unasyn for possible pneumonia on 5/20/2021 which was switched to ceftriaxone the next day for sputum culture growing Klebsiella.  On 5/23, sputum culture also grew Pseudomonas for which ceftriaxone was switched to Zosyn.  Due to increased white blood cell counts, possibility of ventriculitis was raised however CSF was negative but antibiotic was switched to cefepime and vancomycin on 5/28/2021 with plan to continue for 2 weeks.  Vanco has been discontinued on 6/9/2021.  On 6/5/2021, sputum grew ESBL so cefepime was switched to meropenem which he is on currently.  History is provided by EMR and signout from previous provider       Medical History  No past medical history on file.  Essential hypertension  Subarachnoid hemorrhage Surgical History  He  has no past surgical history on file.  Craniotomy     Social History  Reviewed, and he         Allergies  Not on File Family History  Reviewed, and family history is not on file.          Prior to  Admission Medications   No medications prior to admission.          Review of Systems:  A 12 point comprehensive review of systems was negative except as noted. Physical Exam:  Temp:  [98.4  F (36.9  C)] 98.4  F (36.9  C)  Heart Rate:  [83] 83  Resp:  [22] 22  BP: (178)/(103) 178/103  FiO2 (%):  [35 %] 35 %    BP (!) 178/103 (Patient Position: Lying)   Pulse 83   Temp 98.4  F (36.9  C) (Oral)   Resp 22   SpO2 99%     General Appearance:    Awake, alert   Head:    Normocephalic, without obvious abnormality, atraumatic   Eyes:    No pallor   Neck:   Supple, symmetrical, trachea midline, no adenopathy;        thyroid:  No enlargement/tenderness/nodules   Back:     Symmetric, no curvature, ROM normal, no CVA tenderness   Lungs:     Bilateral crackles   Chest wall:    No tenderness or deformity   Heart:    Regular rate and rhythm, S1 and S2 normal   Abdomen:     Soft, non-tender, bowel sounds present   Musculoskeletal:   Extremities are warm and non-tender, atraumatic, no joint swelling or tenderness   Pulses:   2+ and symmetric all extremities   Skin:   Skin color, texture, turgor normal, no rashes or lesions on exposed areas, please see nursing assessment for full skin assessment   Neurologic:   Awake, answers in yes or no, follows few commands        Pertinent Labs  Lab Results: personally reviewed.         Invalid input(s): LABALBU        Invalid input(s):  EOSPCT        MOST RECENT A1c, Iron, TIBC, Coags, TFTs  No results found for: HGBA1C, INR, PTT  No results found for: IRON, TRANSFERRIN  No results found for: TSH, T3FREE, FREET4      Pertinent Radiology  Radiology Results: Personally reviewed impression/s  EKG Results: not yet available.       CT head 6/11/21  1. Left frontal approach ventriculostomy catheter is removed. Small  amount of intraparenchymal hemorrhage and surrounding vasogenic edema  along the prior track, previously partially obscured by catheter,  likely not significantly changed. Stable  ventricle size.  2. Stable scattered subarachnoid hemorrhages and right frontal  subdural collection  3. Unchanged multifocal evolving infarctions.    CT head 6/9/21  1. Stable left frontal approach ventriculostomy catheter with stable  ventricular size.  2. Stable scattered subarachnoid hemorrhages and right frontal  subdural collection.  3. Unchanged multifocal evolving infarctions.    MRI brain 5/28/21  1. Multifocal infarcts involving the right frontal, parietal,  temporal, and left parasagittal parietal lobes now subacute in age  with associated edema, sulcal effacement, mass effect with grossly  unchanged 11 mm of right-to-left midline shift. Basal cisterns are  patent.  2. Extensive subarachnoid hemorrhages involving the bilateral cerebral  convexities including the sylvian fissures, not significant changed.  3. Multifocal microhemorrhage/hemosiderin deposition in the bilateral  basal ganglia, insula, left frontal lobe, and right cerebellar  hemisphere. Hemosiderosis.  4. Stable ventriculomegaly. Left anterior approach ventriculostomy  catheter in stable position.  5. Small right cerebral and cerebellar subdural hematoma.    Advanced Care Planning  Discharge Planning discussed with patient  Anticipated Length of Stay in midnights (including a midnight in the Emergency Department after triage if applicable): Multiple days for evaluation and treatment of vent weaning      Sheri Beltre MD  Internal Medicine Hospitalist  6/11/2021    This note was dictated using voice recognition software. Any grammatical or context distortions are unintentional and inherent to the software.

## 2021-06-26 NOTE — PLAN OF CARE
Problem: Mechanical Ventilation  Goal: Patient will maintain patent airway  Outcome: Progressing  Goal: Tracheostomy will be managed safely  Outcome: Progressing  Goal: Respiratory status - ventilation  Description: Movement of air in and out of the lungs.    Liberate from ventilator  Outcome: Progressing   RT PROGRESS NOTE     DATA:     CURRENT SETTINGS:             TRACH TYPE / SIZE:  #6 shiley placed 6/7             MODE:   prvc/ac 14, 450, peep 5, 30%                  ACTION:             THERAPIES:   duo neb q6h             SUCTION:                           FREQUENCY:   x3                        AMOUNT:   large                         CONSISTENCY:   normal to thick                        COLOR:   yellow             SPONTANEOUS COUGH EFFORT/STRENGTH OF EFFORT (not elicited by suctioning): weak to moderate                              WEANING PHASE:   1                        WEAN MODE:    cpap 5/ps 8                        WEAN TIME:   started at 08:20                           RESPONSE:             BS:   diminished and coarse             VITAL SIGNS:                 EMOTIONAL NEEDS / CONCERNS:  none                RISK FOR SELF DECANNULATION:  yes                        RISK DUE TO:  confusion                        INTERVENTION/S IN PLACE IS/ARE:      NOTE / PLAN:   Continue current tx plan

## 2021-06-26 NOTE — PLAN OF CARE
Problem: Mechanical Ventilation  Goal: Patient will maintain patent airway  Outcome: Progressing  Goal: Tracheostomy will be managed safely  Outcome: Progressing  Goal: Respiratory status - ventilation  Description: Movement of air in and out of the lungs.    Liberate from ventilator  Outcome: Progressing   RT PROGRESS NOTE     DATA:     CURRENT SETTINGS: PRVC/AC 14/450/+5             TRACH TYPE / SIZE: 6 Shiley placed on 06/07/21             MODE:  PRVC/AC             FIO2:   24%     ACTION:             THERAPIES:   DUO NEB Q6             SUCTION:                           FREQUENCY: X 5                        AMOUNT: Moderate                         CONSISTENCY: Thin                        COLOR: White/Yellow              SPONTANEOUS COUGH EFFORT/STRENGTH OF EFFORT (not elicited by suctioning): Yes/Strong                               WEANING PHASE: 2                        WEAN MODE:  None ,did wean on PS +8  for 9 hrs per day shift RT report.                        WEAN TIME:                          END WEAN REASON:       RESPONSE:             BS:  Coarse              VITAL SIGNS: O2  SAT 95-98%, HR 73-87 , RR  20-26             EMOTIONAL NEEDS / CONCERNS: None              RISK FOR SELF DECANNULATION:  Possible                         RISK DUE TO:  Confuse                        INTERVENTION/S IN PLACE IS/ARE: Bilateral restraints .   NOTE / PLAN:        Pt continues on full vent support, stable with no RR distress . Plan continue to monitor .

## 2021-06-26 NOTE — PROGRESS NOTES
[facility] Pulmonary Medicine - Progress Note    Assessment:  Principal Problem:    SAH (subarachnoid hemorrhage) (H)  Active Problems:    Acute respiratory failure with hypoxia (H)    ESBL (extended spectrum beta-lactamase) producing bacteria infection    Attention to tracheostomy (H)    Acute and chronic respiratory failure with hypoxia (H)    On mechanically assisted ventilation (H)    70 y.o. 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 Course c/b bilateral BAYLEE infarct with vasospasm. Extubated on 6/3/2021 and re-intubated on 6/6/2021 due to hypoxemia. On 6/7/2021, patient underwent tracheostomy and PEG tube placement. Treated for klebsiella and pseudomonal VAP, ESBL.    Recommendations/Plans:    Advance to Phase 2 weaning and see if he can tolerate this despite the large secretions.    Continue duonebs    Currently on Meropenem for 6/5 sputum culture that grew ESBL klebsiella which will continue until 6/18.     Family concerned about increase in secretions but without change in requirement for O2 or fever/leukocytosis. Procalcitonin ordered, CXR ordered. If negative, will not pursue sputum culture as he is likely colonized with pseudomonas and klebsiella.     Trach: 6 Malcolm, hunter 6/7/21. Trach change/downsize at 10-14 days, depending on progress with PS weaning    SLP consulted. Tolerating intermittent speaking valve.  Did pass BDT.       Discussed my impressions and recommendations for care with RT. Updated wife via phone.       Prema Mortensen MD    -------------    Subjective        Current FiO2 (%): 24 % Current PEEP: +5    Tracheal secretions: x4 large and thick    Current phase of ventilator weaning pathway:  Phase 1  Ventilator weaning results from yesterday: 13 hours 8/5    Physical Exam:  Vitals:    06/18/21 0724 06/18/21 0910 06/18/21 1450  06/18/21 1520   BP: 126/78   (!) 164/98   Patient Position: Lying   Semi-barraza   Pulse: 75 76 82 81   Resp: 26 15 25 24   Temp: 98.3  F (36.8  C)   98.5  F (36.9  C)   TempSrc: Axillary   Axillary   SpO2: 100% 100% 93% 100%   Weight:       Height:         GEN: NAD, resting in bed  HEENT: MMM, trach in place  CVS: regular rhythm, no murmurs  RESP: Rhoncherous in anterior lung fields  ABD: Soft, No abdominal pain with palpation   SKIN: Warm extremities, no visible rashes  EXT: warm, no edema  NEURO:  Grossly intact    Pertinent Labs:   Lab Results: personally reviewed.     Pertinent Radiology:  6/14/21 CXR:  The tracheostomy projects within the tracheal air column. Trachea is midline and central airways are patent.     The lungs are symmetrically inflated. Focal opacity in the medial left base is typical location for subsegmental atelectasis. No interstitial thickening or airspace opacities elsewhere in the lungs.     No pleural fluid or pneumothorax.

## 2021-06-26 NOTE — PROGRESS NOTES
Progress Note    Brief summary:   70 y.o. old male with past medical history of hypertension who presented to ED on 5/15/2021 for altered mental status. He had a fall and was found unresponsive by his wife. He was found to have acute subarachnoid hemorrhage.  On 5/15, he underwent left external ventricular drain placement.  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 One the same day, he also underwent craniotomy with clipping of PCOM aneurysm.  On 5/28/2021, patient had persistently elevated TCD, CT head showed bilateral BAYLEE infarct.  Next day, for persistent TCD and CTA showing bilateral BAYLEE vasospasm, was treated with milrinone and verapamil.  On 6/1/2021, angiogram showed no evidence of vasospasm.  He was extubated on 6/3/2021.On 6/6/2021, patient was reintubated due to drop in his saturation.  On 6/7/2021, patient underwent tracheostomy and PEG tube placement.  On 6/9/2021, EVD was removed.  Patient was initially started on Unasyn for possible pneumonia on 5/20/2021 which was switched to ceftriaxone the next day for sputum culture growing Klebsiella.  On 5/23, sputum culture also grew Pseudomonas for which ceftriaxone was switched to Zosyn.  Due to increased white blood cell counts, possibility of ventriculitis was raised however CSF was negative but antibiotic was switched to cefepime and vancomycin on 5/28/2021 with plan to continue for 2 weeks.  Vanco has been discontinued on 6/9/2021.  On 6/5/2021, sputum grew ESBL so cefepime was switched to meropenem.. On 6/14, pt developed some fatigue for which a CT head was done which showed slight increase in caliber of lateral and third ventricle  with possibility of developing communicating hydrocephalus. This was discussed with Dr. Casillas (neurosurgeon at [facility]), who did not think there was much change, however suggested repeating CT head on 6/16 and call them if any changes. Repeat CT head showed no new changes.    Assessment/Plan  Principal  Problem:    SAH (subarachnoid hemorrhage) (H)  Active Problems:    Acute respiratory failure with hypoxia (H)    ESBL (extended spectrum beta-lactamase) producing bacteria infection    Attention to tracheostomy (H)    Acute and chronic respiratory failure with hypoxia (H)    On mechanically assisted ventilation (H)    #Hypernatremia: due to dehydration. Resolved now.     #Leucocytosis: No fever, likely from hemoconcentration with dehydration. Currently resolved.     #Acute hypoxic respiratory failure: Initially postoperatively but later complicated by pneumonia.  On mechanical ventilation, tracheostomy done on 6/7/2021.  Vent management per RT and pulmonology, appreciate input.  Currently on phase 1 weaning trial.      #Recent subarachnoid hemorrhage: Presented with fall and unresponsiveness on 5/15/2021.  S/p EVD, angiogram showing ruptured posterior communicating aneurysm followed by craniotomy with clipping of PCOM aneurysm on the same day.  EVD removed on 6/9/2021.  Postoperative period was complicated by vasospasm, bilateral BAYLEE infarct. MRI done on 5/28 showed right frontal parietal, temopral and left parasagittal parietal lobes infarcts.  On 6/1/2021, angiogram showed no vasospasm. Will need to follow up with Dr. Martinez in Neurosurgery clinic in 3 months with repeat CTA of head and neck for post-operative follow up     #Acute metabolic encephalopathy: likely due to bleed, infarcts.   CT head showed slight increase in caliber of lateral and third ventricle  with possibility of developing communicating hydrocephalus. This was discussed with Dr. Casillas (neurosurgeon at [facility]), who did not think there was much change, however suggested repeating CT head which was done on 6/16 with no change from prior..   Neurology following pt here, appreciate input.   On restraints- mittens.     #Hospital acquired pneumonia: Sputum cultures from prior hospitalization showed Klebsiella, Pseudomonas.  Patient has been on  antibiotics since 5/20/2021.  On 6/5/2021, was sputum grew ESBL for which patient is on meropenem, until 6/19/2021.     #Oropharyngeal dysphagia: On tube feeding via PEG tube, dietitian, speech following.     #Hypertension:  On lisinopril, dose increased to 40mg this stay for better BP control.     #Diarrhea: C diff checked at [place] was negative. Likely abx associated diarrhea, resolved, rectal tube out    #Moderate malnutrition- RD following    #Hypokalemia - likely secondary to GI loss - resolved.  -On K protocol, RN to manage    DVT ppx:Heparin  GI ppx:Famotidine    Diet: Tube feeding    Code: Full code    Subjective  Overnight events reviewed.   Patient seen and examined.  Pt awake, following simple commands. Working with therapy during my visit. Appears comfortable.  Wife and daughter on the ipad.        Objective    Vital signs in last 24 hours  Temp:  [98.6  F (37  C)-99.1  F (37.3  C)] 98.6  F (37  C)  Heart Rate:  [67-77] 69  Resp:  [16-24] 24  BP: (135-158)/(80-90) 147/83  FiO2 (%):  [24 %-30 %] 24 %  Weight:   138 lb 4.8 oz (62.7 kg)    Intake/Output last 3 shifts  I/O last 3 completed shifts:  In: 3893.7 [NG/GT:3367; IV Piggyback:526.7]  Out: 3350 [Urine:3350]  Intake/Output this shift:  No intake/output data recorded.    Physical Exam   General: Awake, alert  Eyes: PERRL+, no pallor/scleral icterus  Chest: Clear to auscultation bilaterally  Heart: RRR  Abdomen: soft, non tender  Neuro:Awake, slow response to simple commands, generalized weakness      Meds    amino acids-protein hydrolys  1 packet Enteral Tube DAILY     famotidine  20 mg Enteral Tube BID     heparin (PF) ANTICOAGULANT  5,000 Units Subcutaneous Q8H FIXED TIMES     insulin aspart (NovoLOG) injection   Subcutaneous Q6H FIXED TIMES     ipratropium-albuteroL  3 mL Nebulization Q6H - RT     Lactobacillus rhamnosus GG  1 capsule Enteral Tube Daily with brkfst     lisinopriL  40 mg Enteral Tube Daily     meropenem (MERREM) IVPB in 100mL  2  g Intravenous Q8H     nystatin  5 mL Swish & Swallow QID       Pertinent Labs   Lab Results: personally reviewed.     Results from last 7 days   Lab Units 06/17/21  0449 06/16/21  0639 06/15/21  1705 06/15/21  0600   LN-SODIUM mmol/L 143 142 145 147*   LN-POTASSIUM mmol/L 3.9 4.0 4.2 3.4*   LN-CHLORIDE mmol/L 106 105  --  108*   LN-CO2 mmol/L 29 30  --  30   LN-BLOOD UREA NITROGEN mg/dL 29* 28*  --  35*   LN-CREATININE mg/dL 0.69* 0.67*  --  0.69*   LN-CALCIUM mg/dL 9.5 9.4  --  9.6         Results from last 7 days   Lab Units 06/16/21  0639 06/15/21  0600 06/14/21  0734   LN-WHITE BLOOD CELL COUNT thou/uL 6.7 8.1 12.5*   LN-HEMOGLOBIN g/dL 8.2* 8.6* 9.4*   LN-HEMATOCRIT % 26.5* 28.4* 30.7*   LN-PLATELET COUNT thou/uL 250 288 372         Pertinent Radiology   Radiology Results: Personally reviewed impressions    Xr Chest 1 View Portable    Result Date: 6/14/2021  EXAM: XR CHEST 1 VIEW PORTABLE LOCATION: [facility] DATE/TIME: 6/14/2021 1:43 PM INDICATION: resp failure, pneumonia COMPARISON: Portable AP view of the chest 6/9/2021     The tracheostomy projects within the tracheal air column. Trachea is midline and central airways are patent. The lungs are symmetrically inflated. Focal opacity in the medial left base is typical location for subsegmental atelectasis. No interstitial thickening or airspace opacities elsewhere in the lungs. No pleural fluid or pneumothorax.     Ct Head Without Contrast    Result Date: 6/16/2021  EXAM: CT HEAD WO CONTRAST LOCATION: [facility] DATE/TIME: 6/16/2021 11:26 AM INDICATION: follow up on the size of the ventricles COMPARISON: CT 06/14/2021. TECHNIQUE: Routine CT Head without IV contrast. Multiplanar reformats. Dose reduction techniques were used. FINDINGS: INTRACRANIAL CONTENTS: Again seen are postop changes from right frontotemporal craniotomy with aneurysm clip in the right parasellar region. Areas of stable low-density change  in the anterior right temporal lobe and base of the right frontal lobe. Stable  mixed density thin extra-axial fluid collection deep to the craniotomy flap. Again seen are small scattered areas of residual subarachnoid hemorrhage in the cerebral sulci. Again seen are small amounts of blood products layering in the occipital horns of the lateral ventricles. These subarachnoid and intraventricular blood products have decreased mildly from 06/14/2021 with no new subarachnoid or intraventricular hemorrhage. Again seen is a broad recent acute-subacute infarct along the anterior paramedian right frontal lobe with stable localized anterior mass effect. A trace of petechial blood products has developed in the center of this infarct since 06/14/2021. No space-occupying hemorrhage. Stable presumed subacute infarct along the paramedian left posterior frontal lobe near the vertex. Again seen is some edema associated with a previous left frontal shunt tube catheter tract with decreased density of the associated blood products in this region and the interval. Stable mild enlargement of the lateral and third ventricles from 06/14/2021. Stable midline and normal caliber fourth ventricle. VISUALIZED ORBITS/SINUSES/MASTOIDS: No intraorbital abnormality. Mild mucosal thickening scattered about the paranasal sinuses. No middle ear or mastoid effusion. BONES/SOFT TISSUES: No acute abnormality.     1.  No significant change in the size of the mildly enlarged lateral and third ventricles from CT 06/14/2021. Slight decrease in the intraventricular hemorrhage in the occipital horns in the interval. 2.  Again seen is a broad area of evolving infarction involving the anterior paramedian right frontal lobe similar in size to the CT 06/14/2021. A small amount of petechial blood products have developed in the central aspect of this infarct since 06/14/2021. No space-occupying hemorrhage. Continued follow-up recommended. 3.  Stable small subacute  infarct along the paramedian superior left frontal lobe posteriorly. 4.  Again seen are low density changes associated with a previous left frontal approach ventricular catheter track with decrease in the density of blood products along the catheter track in the interval. 5.  Slight decrease in the scattered subarachnoid hemorrhage in the cerebral sulci in the interval. No definite new subarachnoid hemorrhage. 6.  Stable postop changes right frontotemporal craniotomy with aneurysm clip in the right parasellar region with stable mixed density extra-axial fluid and blood products deep to the craniectomy site.    Ct Head Without Contrast    Result Date: 6/14/2021  EXAM: CT HEAD WO CONTRAST LOCATION: [facility] DATE/TIME: 6/14/2021 1:44 PM INDICATION: Follow-up intracranial hemorrhage. Lethargy. COMPARISON: CT head 06/11/2021 TECHNIQUE: Routine CT Head without IV contrast. Multiplanar reformats. Dose reduction techniques were used. FINDINGS: INTRACRANIAL CONTENTS: Right frontoparietal and pterional craniotomy changes with right paraclinoid aneurysm clip as seen previously. Slight interval decrease in residual mixed attenuation extra-axial fluid and blood products deep to the craniotomy flap which currently measures up to C6-C7 millimeters in maximum radial thickness compared to 8 mm previously along the anterior-inferior right frontal lobe. Small volume subarachnoid hemorrhage remains within the posterior most aspect of the left sylvian fissure and adjacent parietal sulci, decreased since the previous exam. Small volume residual intraventricular hemorrhage layering within the temporal horns bilaterally. No definite new hemorrhage or enlarging extra-axial collection. Subacute infarct of the anterior parasagittal right frontal lobe. Small amount of parenchymal blood products along an old left frontal catheter tract, decreased since the prior exam. Mild lateral and third ventriculomegaly,  slightly increased since the prior exam. For example, transverse diameter of the frontal horns measures up to 4 cm compared to 3.8 cm previously. VISUALIZED ORBITS/SINUSES/MASTOIDS: No intraorbital abnormality. Layering partially aerated secretions within the bilateral sphenoid sinuses, similar to the prior exam. Minor mucosal thickening inferior right maxillary sinus. No middle ear or mastoid effusion. BONES/SOFT TISSUES: No new calvarial defect.     1.  Slight interval increase in the caliber of the lateral and third ventricles without evidence for outflow obstruction. Developing communicating hydrocephalus is possible. Follow-up is advised. 2.  Multi compartmental intracranial hemorrhage with decreasing conspicuity of hyperattenuating blood products compared to 06/11/2021. No new hemorrhage or enlarging extra-axial collection identified. 3.  Subacute infarct of the parasagittal right frontal lobe.    Ct External Imaging Head    Result Date: 6/11/2021  Images were obtained from an external facility.  Click PACS Images hyperlink to view images.  Textual results have been scanned into the media tab.    Ct External Imaging Head    Result Date: 6/11/2021  Images were obtained from an external facility.  Click PACS Images hyperlink to view images.  Textual results have been scanned into the media tab.    Xr External Imaging    Result Date: 6/11/2021  Images were obtained from an external facility.  Click PACS Images hyperlink to view images.  Textual results have been scanned into the media tab.    Xr External Imaging Chest    Result Date: 6/11/2021  Images were obtained from an external facility.  Click PACS Images hyperlink to view images.  Textual results have been scanned into the media tab.          Advanced Care Planning:  Discharge Planning discussed with patient's wife, Susy and daughter Yolanda over the ipad in the room.  Anticipated LOS: TBD  Barrier to discharge: Acute issues  Disposition:TBD  Discussed  care with patient's family for >35 minutes with greater than 50% of time spent in counseling and coordination of care.      Kim Knox MD  Hospitalist    This note was dictated using voice recognition software. Any grammatical or context distortions are unintentional and inherent to the software.

## 2021-06-26 NOTE — PROGRESS NOTES
[facility] Progress Note      Date of Service: 6/22/2021     Assessment and plan    Left scrotal swelling  : Present from prior admission as per family: Was told probable hernia  ; Feels more like a hydrocele  ; No local tenderness  ; Check UA, appears clean  ; No recent imaging seen  ; Will get ultrasound of testes: Showing inguinal hernia  ; No signs of incarceration at present  ; Monitor    Increase tracheal secretion  ; Was last seen by pulmonary on 6/18  ; Chest x-ray and pro-Cristi was negative  ; Still having significant secretions  : Sputum culture appears to be growing Pseudomonas: ?  Colonization versus infection  : Pulmonary follow-up  : No new issues      Acute metabolic encephalopathy  Considered multifactorial  ; Needing restraints  ; CT head on 6/14 show some possible increase in ventricular size however repeat CT on 6/16 did not show any acute changes  ; Was seen by neurology 6/18  ; Remain same    Recent subarachnoid hemorrhage:   Presented with fall and unresponsiveness on 5/15/2021.  S/p EVD, angiogram showing ruptured posterior communicating aneurysm followed by craniotomy with clipping of PCOM aneurysm on the same day.  EVD removed on 6/9/2021.    Postoperative period was complicated by vasospasm, bilateral BAYLEE infarct. MRI done on 5/28 showed right frontal parietal, temopral and left parasagittal parietal lobes infarcts.    On 6/1/2021, angiogram showed no vasospasm.   Will need to follow up with Dr. Martinez in Neurosurgery clinic in 3 months with repeat CTA of head and neck for post-operative follow up    Hypernatremia: due to dehydration.  : Resolved  ; Sodium 143-141 139    Leukocytosis  Resolved     Acute hypoxic respiratory failure:   Initially postoperatively but later complicated by pneumonia.  On mechanical ventilation, tracheostomy done on 6/7/2021.    Vent management per RT and pulmonology. Currently on phase 1 weaning.     Hospital acquired pneumonia: Sputum cultures from prior  hospitalization showed Klebsiella, Pseudomonas.  Patient has been on antibiotics since 5/20/2021.  On 6/5/2021, was sputum grew ESBL for which patient is on meropenem, until 6/19/2021.     Oropharyngeal dysphagia:   On tube feeding via PEG tube, dietitian, speech consult. Video swallow 6/14     Hypertension: On lisinopril     Moderate malnutrition     Hypokalemia - likely secondary to GI loss - resolved.  -On K protocol, RN to manage         This note was dictated using voice recognition software. Any grammatical or context distortions are unintentional and inherent to the software.  Subjective:   Patient seen at bedside, opens eyes  Patient is nurse at bedside  States he is usually more interactive in the evening, tends to sleep during the daytime  No new issues reported      Spoke to wife  ; Left inguinal swelling [facility], she says she was told it was probably inguinal hernia        Brief History: 70 y.o. old male with past medical history of hypertension who presented to ED on 5/15/2021 for altered mental status. He had a fall and was found unresponsive by his wife. He was found to have acute subarachnoid hemorrhage.  On 5/15, he underwent left external ventricular drain placement.  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 One the same day, he also underwent craniotomy with clipping of PCOM aneurysm.  On 5/28/2021, patient had persistently elevated TCD, CT head showed bilateral BAYLEE infarct.  Next day, for persistent TCD and CTA showing bilateral BAYLEE vasospasm, was treated with milrinone and verapamil.  On 6/1/2021, angiogram showed no evidence of vasospasm.  He was extubated on 6/3/2021.On 6/6/2021, patient was reintubated due to drop in his saturation.  On 6/7/2021, patient underwent tracheostomy and PEG tube placement.  On 6/9/2021, EVD was removed.  Patient was initially started on Unasyn for possible pneumonia on 5/20/2021 which was switched to ceftriaxone the next  "day for sputum culture growing Klebsiella.  On 5/23, sputum culture also grew Pseudomonas for which ceftriaxone was switched to Zosyn.  Due to increased white blood cell counts, possibility of ventriculitis was raised however CSF was negative but antibiotic was switched to cefepime and vancomycin on 5/28/2021 with plan to continue for 2 weeks.  Vanco has been discontinued on 6/9/2021.  On 6/5/2021, sputum grew ESBL so cefepime was switched to meropenem which he is on currently.   He was transferred to [facility] on 6[facility]11[facility]21.     6/14/21 CT head done for fatigue- read as slight increase in caliber of lateral and third ventricle  With possibility of developing communicating hydrocephalus. Discussed with Dr. Casillas (neurosurgeon at [facility]), who did not think there was much change, however suggested repeating CT head on 6/16/21 which did not show any change.    Chart reviewed, events noted. Pt seen and examined.     Objective     Vital signs in last 24 hours:  Temp:  [98.1  F (36.7  C)-98.5  F (36.9  C)] 98.5  F (36.9  C)  Heart Rate:  [74-94] 94  Resp:  [19-32] 28  BP: (103-145)/(66-85) 110/71  FiO2 (%):  [24 %-32 %] 30 %  Weight:   139 lb 8 oz (63.3 kg)  Weight change:   Body mass index is 19.46 kg/m .    Intake/Output last 3 shifts:  I/O last 3 completed shifts:  In: 3874 [NG/GT:3874]  Out: 3450 [Urine:3450]  Intake/Output this shift:  No intake/output data recorded.      Physical Exam:  /71 (Patient Position: Lying)   Pulse 94   Temp 98.5  F (36.9  C) (Axillary)   Resp 28   Ht 5' 11\" (1.803 m)   Wt 139 lb 8 oz (63.3 kg)   SpO2 93%   BMI 19.46 kg/m      FiO2 (%): 30 % O2 Device: Trach mask O2 Flow Rate (L/min): 10 L/min    General Appearance:   Sitting up in bed, looks tired   HEENT:    Normocephalic. Pupils equal and reactive. No scleral   Icterus. Moist oral mucosa   Healed surgical scar on scalp   Lungs:     Clear to auscultation bilaterally, respirations unlabored   Heart::    Regular rate and rhythm, S1 and " S2 normal, no murmur, rub   or gallop. No peripheral edema.   Abdomen:   Soft, Non tender. Non distended. Bowel sounds present.  G-tube in place   Extremity :  Left scrotum enlarged  Palpable large left testis, feels fluctuant  Right testis feels normal size   Pulses:   2+ and symmetric all extremities   CNS:  Wakes up with movement otherwise falls back to sleep  No clear facial asymmetry         Scheduled Meds:    amino acids-protein hydrolys  1 packet Enteral Tube DAILY     famotidine  20 mg Enteral Tube BID     heparin (PF) ANTICOAGULANT  5,000 Units Subcutaneous Q8H FIXED TIMES     insulin aspart (NovoLOG) injection   Subcutaneous Q6H FIXED TIMES     ipratropium-albuteroL  3 mL Nebulization Q6H - RT     Lactobacillus rhamnosus GG  1 capsule Enteral Tube Daily with brkfst     lisinopriL  40 mg Enteral Tube Daily     menthol-zinc oxide   Topical TID     Continuous Infusions:  PRN Meds:.acetaminophen **OR** acetaminophen, alteplase, dextrose 50 % (D50W), glucagon (human recombinant), hydrALAZINE, HYDROmorphone, naloxone **OR** naloxone **OR** naloxone **OR** naloxone, sodium chloride 0.9%, sodium chloride    Lab Results:  personally reviewed.   not applicable  Recent Results (from the past 24 hour(s))   POCT Glucose    Collection Time: 06/21/21 12:18 PM    Specimen: Blood   Result Value Ref Range    Glucose 114 70 - 139 mg/dL   POCT Glucose    Collection Time: 06/21/21  5:56 PM    Specimen: Blood   Result Value Ref Range    Glucose 131 70 - 139 mg/dL   POCT Glucose    Collection Time: 06/21/21 11:53 PM    Specimen: Blood   Result Value Ref Range    Glucose 133 70 - 139 mg/dL   POCT Glucose    Collection Time: 06/22/21  5:06 AM    Specimen: Blood   Result Value Ref Range    Glucose 131 70 - 139 mg/dL   Basic Metabolic Panel    Collection Time: 06/22/21  6:08 AM   Result Value Ref Range    Sodium 139 136 - 145 mmol/L    Potassium 4.3 3.5 - 5.0 mmol/L    Chloride 102 98 - 107 mmol/L    CO2 29 22 - 31 mmol/L    Anion  Gap, Calculation 8 5 - 18 mmol/L    Glucose 108 70 - 125 mg/dL    Calcium 9.6 8.5 - 10.5 mg/dL    BUN 34 (H) 8 - 22 mg/dL    Creatinine 0.66 (L) 0.70 - 1.30 mg/dL    GFR MDRD Af Amer >60 >60 mL/min/1.73m2    GFR MDRD Non Af Amer >60 >60 mL/min/1.73m2     Results from last 7 days   Lab Units 06/22/21  0506 06/21/21  2353 06/21/21  1756 06/21/21  1218 06/21/21  0557 06/21/21  0004 06/20/21  1747 06/20/21  1119 06/20/21  0511 06/20/21  0013   LN-POC GLUCOSE FINGERSTICK- HE mg/dL 131 133 131 114 135 135 117 130 132 103           Advance Care Planning:   Barriers to discharge: Multiple issues  Anticipated discharge day: Few days  Disposition: Depending on PT Lori Hastings MD  [facility]  Hospitalist

## 2021-06-26 NOTE — PLAN OF CARE
"  Problem: Mechanical Ventilation  Goal: Patient will maintain patent airway  Outcome: Progressing  Goal: Tracheostomy will be managed safely  Outcome: Progressing  Goal: Respiratory status - ventilation  Description: Movement of air in and out of the lungs.    Liberate from ventilator  Outcome: Progressing   RT PROGRESS NOTE     DATA:     CURRENT SETTINGS:             TRACH TYPE / SIZE:  #6 shiley placed 6/7             MODE:   prvc/ac 14, 450, peep 5, 30%                  ACTION:             THERAPIES:   duo neb q6h             SUCTION:                           FREQUENCY:   x3                        AMOUNT:   large                         CONSISTENCY:   normal to thick                        COLOR:   yellow             SPONTANEOUS COUGH EFFORT/STRENGTH OF EFFORT (not elicited by suctioning): weak to moderate                              WEANING PHASE:   1                        WEAN MODE:    cpap 5/ps 10                        WEAN TIME:   started at 09:24                           RESPONSE:             BS:   diminished and coarse             VITAL SIGNS:   Blood pressure (!) 145/93, pulse 75, temperature 98  F (36.7  C), temperature source Oral, resp. rate 20, height 5' 11\" (1.803 m), weight 145 lb 11.2 oz (66.1 kg), SpO2 100 %.                 EMOTIONAL NEEDS / CONCERNS:  none                RISK FOR SELF DECANNULATION:  yes                        RISK DUE TO:  confusion                        INTERVENTION/S IN PLACE IS/ARE:  Bilateral mitts       NOTE / PLAN:   Continue pulmonary toilet.     "

## 2021-06-26 NOTE — PROGRESS NOTES
Progress Note    Brief summary:   70 y.o. old male with past medical history of hypertension who presented to ED on 5/15/2021 for altered mental status. He had a fall and was found unresponsive by his wife. He was found to have acute subarachnoid hemorrhage.  On 5/15, he underwent left external ventricular drain placement.  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 One the same day, he also underwent craniotomy with clipping of PCOM aneurysm.  On 5/28/2021, patient had persistently elevated TCD, CT head showed bilateral BAYLEE infarct.  Next day, for persistent TCD and CTA showing bilateral BAYLEE vasospasm, was treated with milrinone and verapamil.  On 6/1/2021, angiogram showed no evidence of vasospasm.  He was extubated on 6/3/2021.On 6/6/2021, patient was reintubated due to drop in his saturation.  On 6/7/2021, patient underwent tracheostomy and PEG tube placement.  On 6/9/2021, EVD was removed.  Patient was initially started on Unasyn for possible pneumonia on 5/20/2021 which was switched to ceftriaxone the next day for sputum culture growing Klebsiella.  On 5/23, sputum culture also grew Pseudomonas for which ceftriaxone was switched to Zosyn.  Due to increased white blood cell counts, possibility of ventriculitis was raised however CSF was negative but antibiotic was switched to cefepime and vancomycin on 5/28/2021 with plan to continue for 2 weeks.  Vanco has been discontinued on 6/9/2021.  On 6/5/2021, sputum grew ESBL so cefepime was switched to meropenem which he is on currently.   He was transferred to [facility] on 6/11/21.    6/14/21 CT head done for fatigue- read as slight increase in caliber of lateral and third ventricle  With possibility of developing communicating hydrocephalus. Discussed with Dr. Casillas (neurosurgeon at [facility]), who did not think there was much change, however suggested repeating CT head on 6/16/21 which did not show any change.    Assessment/Plan  Principal Problem:     SAH (subarachnoid hemorrhage) (H)  Active Problems:    Acute respiratory failure with hypoxia (H)    ESBL (extended spectrum beta-lactamase) producing bacteria infection    Attention to tracheostomy (H)    Acute and chronic respiratory failure with hypoxia (H)    On mechanically assisted ventilation (H)    #Hypernatremia: due to dehydration. Resolved. D5 discontinued. Continue flushes     #Leucocytosis: mild, no fever, could be from hemoconcentration with dehydration. resolved    #Acute hypoxic respiratory failure: Initially postoperatively but later complicated by pneumonia.  On mechanical ventilation, tracheostomy done on 6/7/2021.  Vent management per RT and pulmonology. Currently on phase 1 weaning.     #Recent subarachnoid hemorrhage: Presented with fall and unresponsiveness on 5/15/2021.  S/p EVD, angiogram showing ruptured posterior communicating aneurysm followed by craniotomy with clipping of PCOM aneurysm on the same day.  EVD removed on 6/9/2021.  Postoperative period was complicated by vasospasm, bilateral BAYLEE infarct. MRI done on 5/28 showed right frontal parietal, temopral and left parasagittal parietal lobes infarcts.  On 6/1/2021, angiogram showed no vasospasm. Will need to follow up with Dr. Martinez in Neurosurgery clinic in 3 months with repeat CTA of head and neck for post-operative follow up     #Acute metabolic encephalopathy: likely due to bleed, infarcts. On restraints- mittens.CT head showed slight increase in caliber of lateral and third ventricle  with possibility of developing communicating hydrocephalus. This was discussed with Dr. Casillas (neurosurgeon at [facility]), who did not think there was much change, however suggested repeating CT head which was done on 6/16 with no change from prior..   Neurology following pt here, appreciate input.      #Hospital acquired pneumonia: Sputum cultures from prior hospitalization showed Klebsiella, Pseudomonas.  Patient has been on antibiotics since  5/20/2021.  On 6/5/2021, was sputum grew ESBL for which patient is on meropenem, until 6/19/2021.     #Oropharyngeal dysphagia: On tube feeding via PEG tube, dietitian, speech consult. Video swallow 6/14     #Hypertension: On lisinopril     #Diarrhea: C diff checked at [place] was negative. Likely abx associated diarrhea.  resolved, rectal tube out    #Moderate malnutrition    #Hypokalemia - likely secondary to GI loss - resolved.  -On K protocol, RN to manage    Subjective  Patient seen and examined  He was sitting in the chair. Awake, doesn't follow commands. Asked him to wiggle his toes which he didn't but he was moving both arms. At one point he was trying to mouth out something but incomprehensible.    Objective    Vital signs in last 24 hours  Temp:  [98.3  F (36.8  C)-99.2  F (37.3  C)] 98.3  F (36.8  C)  Heart Rate:  [75-88] 75  Resp:  [20-26] 26  BP: (126-163)/(78-90) 126/78  FiO2 (%):  [24 %] 24 %  Weight:   138 lb 4.8 oz (62.7 kg)    Intake/Output last 3 shifts  I/O last 3 completed shifts:  In: 4007 [NG/GT:4007]  Out: 3050 [Urine:3050]  Intake/Output this shift:  I/O this shift:  In: 373 [I.V.:3; NG/GT:370]  Out: -     Physical Exam   General:awake, lethargic  Eyes: no pallor  Chest: Clear to auscultation bilaterally  Heart: RRR, normal S1 and S2  Abdomen: soft, non tender  Neuro: doesn't follow commands, moving arms spontaneously      Meds    amino acids-protein hydrolys  1 packet Enteral Tube DAILY     famotidine  20 mg Enteral Tube BID     heparin (PF) ANTICOAGULANT  5,000 Units Subcutaneous Q8H FIXED TIMES     insulin aspart (NovoLOG) injection   Subcutaneous Q6H FIXED TIMES     ipratropium-albuteroL  3 mL Nebulization Q6H - RT     Lactobacillus rhamnosus GG  1 capsule Enteral Tube Daily with brkfst     lisinopriL  40 mg Enteral Tube Daily     meropenem (MERREM) IVPB in 100mL  2 g Intravenous Q8H     nystatin  5 mL Swish & Swallow QID     sodium chloride  3 mL Intravenous Line Care       Pertinent  Labs   Lab Results: personally reviewed.   not applicable    Results from last 7 days   Lab Units 06/18/21  0637 06/17/21  0449 06/16/21  0639   LN-SODIUM mmol/L 142 143 142   LN-POTASSIUM mmol/L 4.0 3.9 4.0   LN-CHLORIDE mmol/L 104 106 105   LN-CO2 mmol/L 30 29 30   LN-BLOOD UREA NITROGEN mg/dL 31* 29* 28*   LN-CREATININE mg/dL 0.67* 0.69* 0.67*   LN-CALCIUM mg/dL 9.2 9.5 9.4         Results from last 7 days   Lab Units 06/18/21  0637 06/16/21  0639 06/15/21  0600   LN-WHITE BLOOD CELL COUNT thou/uL 6.1 6.7 8.1   LN-HEMOGLOBIN g/dL 9.0* 8.2* 8.6*   LN-HEMATOCRIT % 28.2* 26.5* 28.4*   LN-PLATELET COUNT thou/uL 229 250 288         Pertinent Radiology   Radiology Results: Not yet available for review    Xr Chest 1 View Portable    Result Date: 6/14/2021  EXAM: XR CHEST 1 VIEW PORTABLE LOCATION: [facility] DATE/TIME: 6/14/2021 1:43 PM INDICATION: resp failure, pneumonia COMPARISON: Portable AP view of the chest 6/9/2021     The tracheostomy projects within the tracheal air column. Trachea is midline and central airways are patent. The lungs are symmetrically inflated. Focal opacity in the medial left base is typical location for subsegmental atelectasis. No interstitial thickening or airspace opacities elsewhere in the lungs. No pleural fluid or pneumothorax.     Ct Head Without Contrast    Result Date: 6/16/2021  EXAM: CT HEAD WO CONTRAST LOCATION: [facility] DATE/TIME: 6/16/2021 11:26 AM INDICATION: follow up on the size of the ventricles COMPARISON: CT 06/14/2021. TECHNIQUE: Routine CT Head without IV contrast. Multiplanar reformats. Dose reduction techniques were used. FINDINGS: INTRACRANIAL CONTENTS: Again seen are postop changes from right frontotemporal craniotomy with aneurysm clip in the right parasellar region. Areas of stable low-density change in the anterior right temporal lobe and base of the right frontal lobe. Stable  mixed density thin extra-axial  fluid collection deep to the craniotomy flap. Again seen are small scattered areas of residual subarachnoid hemorrhage in the cerebral sulci. Again seen are small amounts of blood products layering in the occipital horns of the lateral ventricles. These subarachnoid and intraventricular blood products have decreased mildly from 06/14/2021 with no new subarachnoid or intraventricular hemorrhage. Again seen is a broad recent acute-subacute infarct along the anterior paramedian right frontal lobe with stable localized anterior mass effect. A trace of petechial blood products has developed in the center of this infarct since 06/14/2021. No space-occupying hemorrhage. Stable presumed subacute infarct along the paramedian left posterior frontal lobe near the vertex. Again seen is some edema associated with a previous left frontal shunt tube catheter tract with decreased density of the associated blood products in this region and the interval. Stable mild enlargement of the lateral and third ventricles from 06/14/2021. Stable midline and normal caliber fourth ventricle. VISUALIZED ORBITS/SINUSES/MASTOIDS: No intraorbital abnormality. Mild mucosal thickening scattered about the paranasal sinuses. No middle ear or mastoid effusion. BONES/SOFT TISSUES: No acute abnormality.     1.  No significant change in the size of the mildly enlarged lateral and third ventricles from CT 06/14/2021. Slight decrease in the intraventricular hemorrhage in the occipital horns in the interval. 2.  Again seen is a broad area of evolving infarction involving the anterior paramedian right frontal lobe similar in size to the CT 06/14/2021. A small amount of petechial blood products have developed in the central aspect of this infarct since 06/14/2021. No space-occupying hemorrhage. Continued follow-up recommended. 3.  Stable small subacute infarct along the paramedian superior left frontal lobe posteriorly. 4.  Again seen are low density changes  associated with a previous left frontal approach ventricular catheter track with decrease in the density of blood products along the catheter track in the interval. 5.  Slight decrease in the scattered subarachnoid hemorrhage in the cerebral sulci in the interval. No definite new subarachnoid hemorrhage. 6.  Stable postop changes right frontotemporal craniotomy with aneurysm clip in the right parasellar region with stable mixed density extra-axial fluid and blood products deep to the craniectomy site.    Ct Head Without Contrast    Result Date: 6/14/2021  EXAM: CT HEAD WO CONTRAST LOCATION: [facility] DATE/TIME: 6/14/2021 1:44 PM INDICATION: Follow-up intracranial hemorrhage. Lethargy. COMPARISON: CT head 06/11/2021 TECHNIQUE: Routine CT Head without IV contrast. Multiplanar reformats. Dose reduction techniques were used. FINDINGS: INTRACRANIAL CONTENTS: Right frontoparietal and pterional craniotomy changes with right paraclinoid aneurysm clip as seen previously. Slight interval decrease in residual mixed attenuation extra-axial fluid and blood products deep to the craniotomy flap which currently measures up to C6-C7 millimeters in maximum radial thickness compared to 8 mm previously along the anterior-inferior right frontal lobe. Small volume subarachnoid hemorrhage remains within the posterior most aspect of the left sylvian fissure and adjacent parietal sulci, decreased since the previous exam. Small volume residual intraventricular hemorrhage layering within the temporal horns bilaterally. No definite new hemorrhage or enlarging extra-axial collection. Subacute infarct of the anterior parasagittal right frontal lobe. Small amount of parenchymal blood products along an old left frontal catheter tract, decreased since the prior exam. Mild lateral and third ventriculomegaly, slightly increased since the prior exam. For example, transverse diameter of the frontal horns measures up to 4 cm  compared to 3.8 cm previously. VISUALIZED ORBITS/SINUSES/MASTOIDS: No intraorbital abnormality. Layering partially aerated secretions within the bilateral sphenoid sinuses, similar to the prior exam. Minor mucosal thickening inferior right maxillary sinus. No middle ear or mastoid effusion. BONES/SOFT TISSUES: No new calvarial defect.     1.  Slight interval increase in the caliber of the lateral and third ventricles without evidence for outflow obstruction. Developing communicating hydrocephalus is possible. Follow-up is advised. 2.  Multi compartmental intracranial hemorrhage with decreasing conspicuity of hyperattenuating blood products compared to 06/11/2021. No new hemorrhage or enlarging extra-axial collection identified. 3.  Subacute infarct of the parasagittal right frontal lobe.    Ct External Imaging Head    Result Date: 6/11/2021  Images were obtained from an external facility.  Click PACS Images hyperlink to view images.  Textual results have been scanned into the media tab.    Ct External Imaging Head    Result Date: 6/11/2021  Images were obtained from an external facility.  Click PACS Images hyperlink to view images.  Textual results have been scanned into the media tab.    Xr External Imaging    Result Date: 6/11/2021  Images were obtained from an external facility.  Click PACS Images hyperlink to view images.  Textual results have been scanned into the media tab.    Xr External Imaging Chest    Result Date: 6/11/2021  Images were obtained from an external facility.  Click PACS Images hyperlink to view images.  Textual results have been scanned into the media tab.          Advanced Care Planning:  Discharge Planning discussed with patient   Anticipated LOS: TBD  Barrier to discharge:acute issues  Disposition:TBD  Discussed care with patient for >35 minutes with greater than 50% of time spent in counseling and coordination of care.      Sheri Beltre MD  Hospitalist  [phone]    This note was  dictated using voice recognition software. Any grammatical or context distortions are unintentional and inherent to the software.

## 2021-06-26 NOTE — PROGRESS NOTES
Bedside EEG was performed that included photic stimulation; hyperventilation was deferred. The patient was awake and on trach throughout the recording.    [id number] used.

## 2021-06-26 NOTE — PROGRESS NOTES
Speech Language/Pathology    Speech Therapy attempted to see patient twice today.  He was sleeping on both occasions.  This PM, he was asleep in bed with bilateral mitts in place.  Writer elected not to awaken him.  Speech will re-attempt to work with patient tomorrow as schedule permits.     Sheridan Clark MA, CCC-SLP

## 2021-06-26 NOTE — PROGRESS NOTES
"Clinical Nutrition Therapy Assessment Note    Reason for Assessment:   Dayron Neri is a 70 y.o. male assessed by the registered Dietitian for nutrition support.    subarachnoid hemorrhage s/p craniotomy with clipping of PCOM aneurysm     Acute metabolic encephalopathy: likely due to bleed, infarcts     Hospital acquired pneumonia     Acute hypoxic respiratory failure     Nutrition History:  Information from chart.  Diet prior to admission: Regular  Recent food/fluid intake: TF started in hospital  Cultural/Confucianist food needs or preferences: None noted  Food allergies or intolerances: NKA    Current Nutrition Prescription:   NPO with enteral TF     Current Nutrition Intake: Osmolite 1.5 at 65 mls/hour with minimal water flushes in patient with SAH  Enteral nutrition access is a percutaneous gastric tube, with a placement date of 6/8/2021.   Tube feeding order provides 1560 mls, 2340 kcals, 98 grams protein, 0 grams fiber, 318 grams carbohydrate,  1189 mls free water from formula, Estimated <200 mls from fluid flush, for a total of @ 1389 mls free water daily.  Current intake meets assessed nutritional needs.   Please defer Flush volume to MD    Anthropometrics:  Height: 5' 11\" (180.3 cm)  Weight: 145 lb 11.2 oz (66.1 kg)  BMI (Calculated): 20.9  BMI indication: 18.5-24.9 normal weight  Ideal body weight 172 lbs  % Ideal body weight  84%    Weight History:   Wt Readings from Last 3 Encounters:   06/12/21 145 lb 11.2 oz (66.1 kg)     RD Nutrition Focused Physical Exam:  The patient has the following physical signs which could indicate malnutrition:  Patient underweight with presumed Subcutaneous fat loss and Muscle loss   Strength: Generalized weakness per nursing notations    GI Status/Output:   GI symptoms include:Diarrhea per nurse   Bowel Sounds present per nurse  Last BM: 6/11 loose stool recorded per nurse   C diff checked at [place] was negative.   Per MD: Likely abx associated diarrhea.  Patient on " rectal tube, plan is to remove once diarrhea is better    Skin/Wound:  Coy score Coy Scale Score: 13  Surgical wounds noted    Medications:  Medications reviewed    Labs:   BG 99 to 146 mg/dL    Estimated Nutrition Needs:  Assessment weight is 66 kg, current weight    Energy Needs: 1650 to 1980 kcals daily, 25 to 30 kcal/kg  Protein Needs: 85 to 100 mg daily, 1.3 to 1.5 g/kg.(20% kcals)   Fluid Needs:  @ 1650 mls daily, 25 mls/kg actual weight     Malnutrition:   Moderate Malnutrition in the context of chronic illness acute with prolonged inadequate PO intake PTA meeting < 75% assessed needs leading to weight loss.   Patient underweight with weakness and physical signs of wasting.     Nutrition Risk Level: moderate risk    Nutrition dx:   Swallowing difficulty r/t respiratory failure as evidenced by patient on mechanical ventilation    Goal:   Meet estimated nutrition needs via TF    TF Goals:   Patient will tolerate enteral TF without s&s of aspiration  Patient will tolerate enteral TF as evidenced by BG < 150 mg/dL   Patient will tolerate enteral TF as evidenced by  RV less than 500 mls  Bowel function WDL   Maintain weight while hospitalized     Intervention:     RD reviewed TF. Recommend continue current TF formula and rate.    Monitoring:    Follow TF tolerance, weight, labs      Electronically signed by:  Kylie Benitez RD

## 2021-06-26 NOTE — PLAN OF CARE
"  Problem: Mechanical Ventilation  Goal: Patient will maintain patent airway  Outcome: Progressing  Goal: Tracheostomy will be managed safely  Outcome: Progressing  Goal: Respiratory status - ventilation  Description: Movement of air in and out of the lungs.    Liberate from ventilator  Outcome: Progressing   RT PROGRESS NOTE     DATA:     CURRENT SETTINGS:             TRACH TYPE / SIZE:  #6 shiley placed 6/7             MODE:   prvc/ac 14, 450, peep 5, 30%                ACTION:             THERAPIES:   duo neb q6h             SUCTION:                           FREQUENCY:   5                        AMOUNT:   small to large                        CONSISTENCY:   normal to thick                        COLOR:   yellow to pale yellow             SPONTANEOUS COUGH EFFORT/STRENGTH OF EFFORT (not elicited by suctioning): moderate to strong                              WEANING PHASE:   2                        WEAN MODE:    25-30% tm                         WEAN TIME:   started on tm at 08:35.  Pt was able to wear pmv while family present for 3 hours and 20 minutes with no distress or stridor noted.                            RESPONSE:             BS:   coarse             VITAL SIGNS:   Blood pressure 145/85, pulse 88, temperature 98.1  F (36.7  C), temperature source Oral, resp. rate 20, height 5' 11\" (1.803 m), weight 139 lb 14.4 oz (63.5 kg), SpO2 92 %.                 EMOTIONAL NEEDS / CONCERNS:  none                RISK FOR SELF DECANNULATION:  yes                        RISK DUE TO:  Confusion/impulsive                        INTERVENTION/S IN PLACE IS/ARE:  Bilateral wrist restraints       NOTE / PLAN:   tm as tolerate daytime and vent at night    "

## 2021-06-26 NOTE — PLAN OF CARE
Noted that patients iv was very red around the parameters of the insertion [id number] to monitor.  During care releases patient did attempt to pull on trach, unable to redirect continue restraints and monitor daily to work towards discontinuing

## 2021-06-26 NOTE — PLAN OF CARE
Problem: Pain  Goal: Patient's pain/discomfort is manageable  Outcome: Progressing     Problem: Safety  Goal: Patient will be injury free during hospitalization  Outcome: Progressing     Problem: Daily Care  Goal: Daily care needs are met  Outcome: Progressing     Problem: Risk of Injury Due to Unsafe Behavior  Goal: Patient will remain safe while in restraint; physical/psychological needs will be met  Outcome: Progressing     Problem: Cellulitis/wound infection  Goal: Increased Knowledge  Outcome: Progressing   Pt was resting in bed most of the day. He was drowsy half of the day but seems more awake this time. He is up in chair at this time and family are there with him. He is on restraint and monitored for safety. Redness bottom and barrier cream applied. No S/S of pain observed.

## 2021-06-26 NOTE — PLAN OF CARE
Problem: Daily Care  Goal: Daily care needs are met  Outcome: Progressing   Patient slept well last night and does not seem to be in any discomfort. Vitals stable. Vent all night with lots of tracheal secretions. Will continue to monitor.

## 2021-06-26 NOTE — PLAN OF CARE
Problem: Pain  Goal: Patient's pain/discomfort is manageable  Outcome: Progressing     Problem: Safety  Goal: Patient will be injury free during hospitalization  Outcome: Progressing     Problem: Daily Care  Goal: Daily care needs are met  Outcome: Progressing     Problem: Mechanical Ventilation  Goal: Patient will maintain patent airway  Outcome: Progressing  Goal: Tracheostomy will be managed safely  Outcome: Progressing     Problem: Risk of Injury Due to Unsafe Behavior  Goal: Patient will remain safe while in restraint; physical/psychological needs will be met  Outcome: Progressing  Goal: Alternatives to restraint will be continually assessed with use of least restrictive device and discontinuation as soon as possible  Outcome: Progressing     Problem: Urinary Incontinence  Goal: Perineal skin integrity is maintained or improved  Outcome: Progressing   Patient continues on mitts x 2; hand noted at times over trach, rubbing his neck.  No s/s of pain noted; was awake and calm with cares/repositioning; sleeping well in between cares.  Patient incontinent of urine, condom catheter placed.  PIV noted infiltrated at site at start of the shift; SWAT RN placed a new PIV.  Patent tolerated procedure.  RT claimed she noticed like tube feeding formula when suctioning, not new and claimed it appears better than previous day.  Note left to MD.  Gastric residual of 5 ml noted.

## 2021-06-26 NOTE — PLAN OF CARE
Problem: Pain  Goal: Patient's pain/discomfort is manageable  Outcome: Progressing     Problem: Safety  Goal: Patient will be injury free during hospitalization  Outcome: Progressing     Problem: Daily Care  Goal: Daily care needs are met  Outcome: Progressing   Vital signs were stable. Pt. is on SL X 2 restraints for safety. Pt. does not appear to be in pain, Pt. was repositioned every 2 hours. Continue to monitor.  Margot Sargent RN

## 2021-06-26 NOTE — PLAN OF CARE
Problem: Pain  Goal: Patient's pain/discomfort is manageable  Outcome: Progressing   Appears comfortable.  Problem: Daily Care  Goal: Daily care needs are met  Outcome: Progressing   Secretions yellowish and in large amounts coming out through stoma. Moderate amounts suctioned from trach.  Problem: Risk of Injury Due to Unsafe Behavior  Goal: Patient will remain safe while in restraint; physical/psychological needs will be met  Outcome: Progressing   Bilateral mittens as restraint for safety; has tendency to bring hands to trach and might accidentally disconnect or displace trach.   Problem: Urinary Incontinence  Goal: Perineal skin integrity is maintained or improved  Outcome: Progressing   Large urine output; urine clear, jose alfredo.  Problem: Cellulitis/wound infection  Goal: Increased Knowledge  Outcome: Progressing   Antibiotic therapy ongoing; afebrile.

## 2021-06-26 NOTE — PLAN OF CARE
Problem: Mechanical Ventilation  Goal: Patient will maintain patent airway  Outcome: Progressing  Goal: Tracheostomy will be managed safely  Outcome: Progressing  Goal: Respiratory status - ventilation  Outcome: Progressing        RT PROGRESS NOTE     DATA:  CURRENT SETTINGS:   PRVC 14, 450, +5, 24% (S/B during daytime)               TRACH TYPE / SIZE:  #6 Shiley DCT     ACTION:             THERAPIES:   Duonebs Q6             SUCTION:   7-8x in 12hrs by RN/RT for mod amts of white secretions. Good, spont cough.               WEANING PHASE:   2    TM/PMV (30L / 30%) wean reji 3hr 15min (2837-5240). Pt does minimal voicing. Due to secretions resembling saliva, PMV removed & cuff inflated.    TM wean continued (30L / 35%), is ongoing, & reji fine. Will return pt to full vent support by HS.     RESPONSE:             BS:   Coarse / clear.             VITAL SIGNS:   HR 76-84, RR 23-28, Sats 93-97%.               EMOTIONAL NEEDS / CONCERNS:   Minimal.                RISK FOR SELF DECANNULATION:   Yes.                        RISK DUE TO:   Confusion.                        INTERVENTION/S IN PLACE IS/ARE:   Bilat wrist restraints.       NOTE / PLAN:   Continue resp support as needed, monitor closely, & wean as tolerated.

## 2021-06-26 NOTE — PLAN OF CARE
Problem: Daily Care  Goal: Daily care needs are met  Outcome: Progressing     Problem: Pain  Goal: Patient's pain/discomfort is manageable  Outcome: Progressing     Problem: Potential for Compromised Skin Integrity  Goal: Nutritional status is improving  Outcome: Progressing     Problem: Urinary Incontinence  Goal: Perineal skin integrity is maintained or improved  Outcome: Progressing     Problem: Glucose Imbalance  Goal: Achieve optimal glucose control  Outcome: Progressing     Problem: Risk of Injury Due to Unsafe Behavior  Goal: Patient will remain safe while in restraint; physical/psychological needs will be met  Outcome: Progressing   Patient has denied pain this shift. Turned and repositioned q 2 hours. Tolerating tube feeding well. Condom cath changed at beginning of shift, patent and draining. Vital signs stable. Blood sugar 121. Total care with ADL`s. Patient continues in soft limb times 2, grabs for tubes when released.

## 2021-06-26 NOTE — PROGRESS NOTES
Physical Therapy     06/14/21 0886   Visit Specifics   Eval Type Initial eval   Inital PT Consult 06/14/21   Restraint Reapplied  Other (Comment)  (has mittens on both hands)   Subjective Patient Comments not able to respond to verbal or tactile cueing, would partially open eyes for 2-3 seconds with stimulation but then close again.  Not showing any response or recognition.  Per RN and MD much more responsive to family   General   Onset date 05/15/21   Additional Pertinent History 70 y.o. old male with past medical history of hypertension who presented to ED on 5/15/2021 for altered mental status. He had a fall and was found unresponsive by his wife. He was found to have acute subarachnoid hemorrhage.  On 5/15, he underwent left external ventricular drain placement.  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 One the same day, he also underwent craniotomy with clipping of PCOM aneurysm.  On 5/28/2021, patient had persistently elevated TCD, CT head showed bilateral BAYLEE infarct.  Next day, for persistent TCD and CTA showing bilateral BAYLEE vasospasm, was treated with milrinone and verapamil.  On 6/1/2021, angiogram showed no evidence of vasospasm.  He was extubated on 6/3/2021.On 6/6/2021, patient was reintubated due to drop in his saturation.  On 6/7/2021, patient underwent tracheostomy and PEG tube placement.  On 6/9/2021, EVD was removed.   Chart Reviewed Yes   PT/OT Patient/Caregiver Stated Goals not speaking   Family/Caregiver Present No   Precautions   General Precautions Bed alarm/Tab alarm   Home Living   Additional Comments see OT note   Prior Status   Comments see OT note   Cognition   Overall Cognitive Status Unable to assess   RLE Assessment   RLE Assessment PROM  (wfl)   LLE Assessment   LLE Assessment PROM  (wfl)   Bed Mobility   Bed Mobility Comments dependent   Transfer    Transfer Comments ceiling lift   Plan   Treatment/Interventions Functional transfer training;Neuromuscular  re-ed;Strengthening/ROM;Gait/stair training   PT Frequency 5x/week   Assessment   Prognosis Good   Problem List Decreased strength;Decreased mobility;Decreased cognition   Barriers to Discharge Decreased caregiver support;Inaccessible home environment   Recommendation   PT Discharge Recommendation Further Therapy Recommended   PT Equipment Recommendation Mechanical lift   Treatment Suggestions for Next Session Will schedule him for later in am. and have nursing get into chair prior to session.  May be more alert and able to particpate    PT Care Plan REVIEWED DAILY Yes, goals remain appropriate

## 2021-06-26 NOTE — PROGRESS NOTES
[facility] RN Assessment Note     Today's Visit: New consult, full head to toe assessment completed. Areas of concern listed below.    Labs:  Albumin   Date/Time Value Ref Range Status   06/14/2021 02:50 PM 2.1 (L) 3.5 - 5.0 g/dL Final     Recent Labs     06/14/21  0734   HGB 9.4*       Vitals:   The last obtained vitals are:   Temp: 99.3  F (37.4  C)  Heart Rate: 75  Resp: 24  BP: 139/84    Coy:  Coy Scale Score: 13  Specialty Bed: Isogel (Graham)  145 lb 11.2 oz (66.1 kg)  Body mass index is 20.32 kg/m .      Wound Ostomy Continence Assessment/Plan:  Full head to toe assessment completed. Areas of concern listed below.  Wound Scalp incisions status post  EVD 5/15-6/9  Left top scalp 3cm with sutures, right top 2cm with sutures   No areas of dehiscence; No signs and symptoms of infection  Treatment Plan: Leave open to air. HUC/nurse to review transferring paperwork if it does not have the suture removal orders then contact Primary MD or Neurosurgery for removal date order.       Ostomy  Attention to Tracheostomy   Shiley faceplate, no concerns  Treatment Plan: Routine cares     Attention to Feeding Tube  No concerns  Treatment Plan: Routine cares      Last Prealbumin: Not available     Coy score: 13          Bed: Accumax          Pillows for heel elevation     Discussed plan of care with nurse and patient.     Outcomes and treatment recommendations are to promote skin integrity, contain exudate and promote wound healing.    Glenda Bo, BSN, RN, PHN, HNB-BC, CWOCN

## 2021-06-26 NOTE — PLAN OF CARE
Problem: Daily Care  Goal: Daily care needs are met  Outcome: Progressing   Patient slept well last night 7-8 hrs. He doesn't seem to be in any discomfort. He is alert but nonverbal. Vitals stable.     Problem: Risk of Injury Due to Unsafe Behavior  Goal: Patient will remain safe while in restraint; physical/psychological needs will be met  Outcome: Progressing   Patient still on mitts x 2 restraints for safety from pulling lines. Will continue to monitor.

## 2021-06-26 NOTE — PLAN OF CARE
"  Problem: Mechanical Ventilation  Goal: Patient will maintain patent airway  Outcome: Progressing  Goal: Tracheostomy will be managed safely  Outcome: Progressing  Goal: Respiratory status - ventilation  Description: Movement of air in and out of the lungs.    Liberate from ventilator  Outcome: Progressing   RT PROGRESS NOTE     DATA:     CURRENT SETTINGS:             TRACH TYPE / SIZE:  #6 shiley placed 6/7             MODE:   prvc/ac 14,450, peep 5, 30%               ACTION:             THERAPIES:   duo neb q6h             SUCTION:                           FREQUENCY:   x3                        AMOUNT:   small to large                        CONSISTENCY:   normal to thick                        COLOR:   white to yellow             SPONTANEOUS COUGH EFFORT/STRENGTH OF EFFORT (not elicited by suctioning): weak to moderate                              WEANING PHASE:   1                        WEAN MODE:    cpap 5/ps 8                        WEAN TIME:   started at 07:50.  There was an interruption in weaning when pt went for CT scan and was placed back to full vent settings only for transport.                       RESPONSE:             BS:   diminished and coarse             VITAL SIGNS:   Blood pressure 141/80, pulse 70, temperature 98.7  F (37.1  C), temperature source Oral, resp. rate 18, height 5' 11\" (1.803 m), weight 138 lb 4.8 oz (62.7 kg), SpO2 99 %.                 EMOTIONAL NEEDS / CONCERNS:  no             RISK FOR SELF DECANNULATION:  yes                        RISK DUE TO:  confusion                        INTERVENTION/S IN PLACE IS/ARE:  Bilateral mitts       NOTE / PLAN:   Pt very alert this afternoon while wife and daughter on IPAD.  Pt answering questions yes and no.  Pt also moving mouth to answer questions.  When I asked how he was doing, he mouthed, \"I'm doing fine\".      "

## 2021-06-26 NOTE — PLAN OF CARE
Problem: Pain  Goal: Patient's pain/discomfort is manageable  Outcome: Progressing     Problem: Safety  Goal: Patient will be injury free during hospitalization  Outcome: Progressing     Problem: Daily Care  Goal: Daily care needs are met  Outcome: Progressing         BP elevated, HR elevated, suctioning done by RT, prn tylenol given for comfort, house officer updated , with new orders. Bilateral mitts in place for safety, monitored. Slept 5-6 hours the whole night.

## 2021-06-26 NOTE — PLAN OF CARE
Problem: Mechanical Ventilation  Goal: Patient will maintain patent airway  Outcome: Progressing  Goal: Tracheostomy will be managed safely  Outcome: Progressing  Goal: Respiratory status - ventilation  Description: Movement of air in and out of the lungs.    Liberate from ventilator  Outcome: Progressing     RT PROGRESS NOTE     DATA:     CURRENT SETTINGS: PRVC/AC 14/450/+5             TRACH TYPE / SIZE: #6 Sh[place]ricket placed on 06/07/21             MODE:  PRVC/AC             FIO2:   24% titrated from 30%     ACTION:             THERAPIES:   DUO NEB Q6             SUCTION:                           FREQUENCY: x4                        AMOUNT:   Moderate to small                        CONSISTENCY:  Thick                        COLOR: Pale yellow             SPONTANEOUS COUGH EFFORT/STRENGTH OF EFFORT (not elicited by suctioning): good cough                                WEANING PHASE: #1                        WEAN MODE:   PS 8, CPAP 5                        WEAN TIME:   13hrs                        END WEAN REASON:  for noc     RESPONSE:             BS:  Coarse             VITAL SIGNS:   SAT %, HR 70-76, RR 14             EMOTIONAL NEEDS / CONCERNS: N/A              RISK FOR SELF DECANNULATION:  Yes                        RISK DUE TO:  Confuse                        INTERVENTION/S IN PLACE IS/ARE: Bilateral mitts inplace     NOTE / PLAN: Pt weaned on PS very well, no distress noted. At the end of weaning , Ve 11 RR 13. Consider advancing to phase 2  soon

## 2021-06-26 NOTE — PLAN OF CARE
Problem: Mechanical Ventilation  Goal: Patient will maintain patent airway  Outcome: Progressing  Goal: Tracheostomy will be managed safely  Outcome: Progressing  Goal: Respiratory status - ventilation  Description: Movement of air in and out of the lungs.    Liberate from ventilator  Outcome: Progressing     RT PROGRESS NOTE     DATA:     CURRENT SETTINGS: AC 14/450/+5             TRACH TYPE / SIZE: #6 Shiley placed on 06/07/21             MODE:  AC             FIO2:   24%     ACTION:             THERAPIES:   DUO NEB Q6             SUCTION:                           FREQUENCY: X6                        AMOUNT:   Large to moderate                        CONSISTENCY:  Thick                        COLOR:   Yellow/white             SPONTANEOUS COUGH EFFORT/STRENGTH OF EFFORT (not elicited by suctioning): Yes:Strong                              WEANING PHASE: #2                        WEAN MODE:  TM days as reji and full vent at night                        WEAN TIME:   12 hrs and 59 min                        END WEAN REASON:       RESPONSE:             BS:  Coarse             VITAL SIGNS:   SAT 93-98%, HR 80-87 , RR 24-28             EMOTIONAL NEEDS / CONCERNS: N/A              RISK FOR SELF DECANNULATION:  Yes                        RISK DUE TO:  Confuse                        INTERVENTION/S IN PLACE IS/ARE: Bilateral restraints     NOTE / PLAN:  Pt is on full vent support, reji well. New order  tm days and full vent at night and keep sat >/=90%

## 2021-06-26 NOTE — PROGRESS NOTES
Progress Note    Brief summary:   70 y.o. old male with past medical history of hypertension who presented to ED on 5/15/2021 for altered mental status. He had a fall and was found unresponsive by his wife. He was found to have acute subarachnoid hemorrhage.  On 5/15, he underwent left external ventricular drain placement.  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 One the same day, he also underwent craniotomy with clipping of PCOM aneurysm.  On 5/28/2021, patient had persistently elevated TCD, CT head showed bilateral BAYLEE infarct.  Next day, for persistent TCD and CTA showing bilateral BAYLEE vasospasm, was treated with milrinone and verapamil.  On 6/1/2021, angiogram showed no evidence of vasospasm.  He was extubated on 6/3/2021.On 6/6/2021, patient was reintubated due to drop in his saturation.  On 6/7/2021, patient underwent tracheostomy and PEG tube placement.  On 6/9/2021, EVD was removed.  Patient was initially started on Unasyn for possible pneumonia on 5/20/2021 which was switched to ceftriaxone the next day for sputum culture growing Klebsiella.  On 5/23, sputum culture also grew Pseudomonas for which ceftriaxone was switched to Zosyn.  Due to increased white blood cell counts, possibility of ventriculitis was raised however CSF was negative but antibiotic was switched to cefepime and vancomycin on 5/28/2021 with plan to continue for 2 weeks.  Vanco has been discontinued on 6/9/2021.  On 6/5/2021, sputum grew ESBL so cefepime was switched to meropenem which he is on currently.       Addendum 3:12PM  CT head done by neurology given fatigue- read as slight increase in caliber of lateral and third ventricle  With possibility of developing communicating hydrocephalus. Discussed with Dr. Casillas (neurosurgeon at [facility]), who did not think there was much change, however suggested repeating CT head on Thursday morning (6/16/21) and call them back if any change.    Assessment/Plan  Principal  Problem:    SAH (subarachnoid hemorrhage) (H)  Active Problems:    Acute respiratory failure with hypoxia (H)    ESBL (extended spectrum beta-lactamase) producing bacteria infection    #Hypernatremia: due to dehydration. Total water deficit calculated to be 4.2L. will start 400 ml water flushes every 4 hours. Will start D5 75 ml per hour for now. Will check Na at 2 pm today.    #Leucocytosis: mild, no fever, could be from hemoconcentration with dehydration. Will monitor    #Acute hypoxic respiratory failure: Initially postoperatively but later complicated by pneumonia.  On mechanical ventilation, tracheostomy done on 6/7/2021.  Vent management per RT and pulmonology     #Recent subarachnoid hemorrhage: Presented with fall and unresponsiveness on 5/15/2021.  S/p EVD, angiogram showing ruptured posterior communicating aneurysm followed by craniotomy with clipping of PCOM aneurysm on the same day.  EVD removed on 6/9/2021.  Postoperative period was complicated by vasospasm, bilateral BAYLEE infarct. MRI done on 5/28 showed right frontal parietal, temopral and left parasagittal parietal lobes infarcts.  On 6/1/2021, angiogram showed no vasospasm. Will need to follow up with Dr. Martinez in Neurosurgery clinic in 3 months with repeat CTA of head and neck for post-operative follow up     #Acute metabolic encephalopathy: likely due to bleed, infarcts. On restraints- mittens.     #Hospital acquired pneumonia: Sputum cultures from prior hospitalization showed Klebsiella, Pseudomonas.  Patient has been on antibiotics since 5/20/2021.  On 6/5/2021, was sputum grew ESBL for which patient is on meropenem, until 6/19/2021.     #Oropharyngeal dysphagia: On tube feeding via PEG tube, dietitian, speech consult. Video swallow 6/14     #Hypertension: On lisinopril     #Diarrhea: C diff checked at [facility] was negative. Likely abx associated diarrhea.  resolved, rectal tube out    #Moderate malnutrition    Subjective  Patient seen and  examined  He is fatigued again this morning, briefly opens his eyes.    Objective    Vital signs in last 24 hours  Temp:  [98.1  F (36.7  C)-99.7  F (37.6  C)] 99.7  F (37.6  C)  Heart Rate:  [76-94] 93  Resp:  [18-33] 29  BP: (130-145)/(67-86) 139/84  FiO2 (%):  [35 %] 35 %  Weight:   145 lb 11.2 oz (66.1 kg)    Intake/Output last 3 shifts  I/O last 3 completed shifts:  In: 2125 [NG/GT:1716; IV Piggyback:409]  Out: 2200 [Urine:2200]  Intake/Output this shift:  No intake/output data recorded.    Physical Exam   General:fatigued, sleepy   Eyes: no pallor  Chest: Clear to auscultation bilaterally  Heart: RRR, normal S1 and S2  Abdomen: soft, non tender  Neuro: unable to assess      Meds    famotidine  20 mg Enteral Tube BID     heparin (PF) ANTICOAGULANT  5,000 Units Subcutaneous Q8H FIXED TIMES     ipratropium-albuteroL  3 mL Nebulization Q6H - RT     lisinopriL  10 mg Enteral Tube Daily     meropenem (MERREM) IVPB in 100mL  2 g Intravenous Q8H     nystatin  5 mL Swish & Swallow QID       Pertinent Labs   Lab Results: personally reviewed.   not applicable    Results from last 7 days   Lab Units 06/12/21  0522   LN-SODIUM mmol/L 145   LN-POTASSIUM mmol/L 3.9   LN-CHLORIDE mmol/L 105   LN-CO2 mmol/L 31   LN-BLOOD UREA NITROGEN mg/dL 27*   LN-CREATININE mg/dL 0.70   LN-CALCIUM mg/dL 9.4         Results from last 7 days   Lab Units 06/13/21  0508 06/12/21  0522   LN-WHITE BLOOD CELL COUNT thou/uL  --  8.3   LN-HEMOGLOBIN g/dL  --  8.8*   LN-HEMATOCRIT %  --  28.0*   LN-PLATELET COUNT thou/uL 394 391         Pertinent Radiology   Radiology Results: Not yet available for review    Ct External Imaging Head    Result Date: 6/11/2021  Images were obtained from an external facility.  Click PACS Images hyperlink to view images.  Textual results have been scanned into the media tab.    Ct External Imaging Head    Result Date: 6/11/2021  Images were obtained from an external facility.  Click PACS Images hyperlink to view images.   Textual results have been scanned into the media tab.    Xr External Imaging    Result Date: 6/11/2021  Images were obtained from an external facility.  Click PACS Images hyperlink to view images.  Textual results have been scanned into the media tab.    Xr External Imaging Chest    Result Date: 6/11/2021  Images were obtained from an external facility.  Click PACS Images hyperlink to view images.  Textual results have been scanned into the media tab.          Advanced Care Planning:  Discharge Planning discussed with patient   Anticipated LOS: TBD  Barrier to discharge:acute issues  Disposition:TBD  Discussed care with patient for >35 minutes with greater than 50% of time spent in counseling and coordination of care.      Sheri Beltre MD  Hospitalist  [phone]    This note was dictated using voice recognition software. Any grammatical or context distortions are unintentional and inherent to the software.

## 2021-06-26 NOTE — PROGRESS NOTES
All issues have been either completed or acknowledged as appropriate  within 24 hours of patient admission.     Sheri Beltre MD 6/12/2021 7:13 AM            Pharmacy Note: Admission Drug Regimen Review    Admission drug regimen review has been completed. The following medication issues were identified.    1. Sputum cultures were positive for Pseudomonas and ESBL-producing Klebsiella. The following antibiotics were administered during their hospitalization:       - Unasyn 5/20       - ceftriaxone 5/21 - 5/23       - Zosyn 5/23 - 5/28       - vanco 5/28 - 6/9       - cefepime 5/28 - 6/5       - meropenem 6/5 - discharge  The meropenem was entered to run through tomorrow, 6/12 for a total of 7 days, though other neurology notes state to continue through 6/19.  2. This patient had been on subcutaneous heparin while at [facility], and the discharge orders state to continue heparin.   3. The patient had been started on nystatin suspension for oral candidiasis on 5/31, and the order was still active as of discharge. No stop date was in place for this medication.  4. Discharge summary states to continue Neutra-Phos packets, though this appears to have been a completed order from an electrolyte replacement protocol.   5. Medications patient was receiving but not listed to continue on discharge summary:      - 3% NaCl nebulization solution, every 6 hours    Thank you,  Jacklyn Wang, PharmD 6/11/2021 5:26 PM

## 2021-06-26 NOTE — PLAN OF CARE
Problem: Mechanical Ventilation  Goal: Patient will maintain patent airway  Outcome: Progressing  Goal: Tracheostomy will be managed safely  Outcome: Progressing  Goal: Respiratory status - ventilation  Description: Movement of air in and out of the lungs.    Liberate from ventilator  Outcome: Progressing   RT PROGRESS NOTE   [id number]  DATA:     CURRENT SETTINGS:             TRACH TYPE / SIZE:  6 Shiley DCT, placed on 6/7             MODE:   PRVC 14 450 +5 35%             FIO2:        ACTION:             THERAPIES:   Duoneb q6             SUCTION:                           FREQUENCY:   X3 small white thickish                        AMOUNT:                           CONSISTENCY:                           COLOR:                SPONTANEOUS COUGH EFFORT/STRENGTH OF EFFORT (not elicited by suctioning):                               WEANING PHASE: 1                          WEAN MODE:   PA 10/5 started at 0800, still on. RR 24-32, Vt 370-450                        WEAN TIME:                           END WEAN REASON:        RESPONSE:             BS:                VITAL SIGNS:                EMOTIONAL NEEDS / CONCERNS:                  RISK FOR SELF DECANNULATION:                          RISK DUE TO:                          INTERVENTION/S IN PLACE IS/ARE:         NOTE / PLAN:     Cont with Phase 1  Cont to monitor closely.

## 2021-06-26 NOTE — PROGRESS NOTES
PROVIDER RESTRAINT FOR NON-VIOLENT BEHAVIOR FACE TO FACE EVALUATION    Patient's Immediate Situation:  Patient demonstrated the following behaviors: Pulling/tugging at invasive lines or tubes and does not respond to verbal/non-verbal redirection    Patient's Reaction to the intervention:  Does patient understand the reason for restraint/seclusion? No    Medical Condition:  Is there any evidence of compromise of Skin integrity, Respiratory, Cardiovascular, Musculoskeletal, Hydration? No    Behavioral Condition:  In consultation with the RN, is there a need to continue this restraint or seclusion? Yes    See Restraint Flowsheet for complete restraint documentation and assessment.    IAN Castaneda

## 2021-06-26 NOTE — PLAN OF CARE
Care Management Progression of Care Update        DR GLOS - Target D/C Date 21        PLAN/GOALS  1. Pulmonary following. Phase 2 wean~Trach mask hi rosemarie 02 the day.  Vent at night settings PRVC/AC @ 24%.  Frequent suctioning 4x/shift. Duo-neb six times a day for pulmonary hygiene.    2.  Nutrition management.  Tube feeding via PEG placed 21.  Registered dietician to monitor tube feeding tolerance, weight and labs.    3.  Neurology following.  Workup for lethargy unremarkable so far.  CT showed no acute issues.    4. PT/OT 5x/week.    5.  Speech therapy 4x/week.    6.  Sputum positive~completed IV Meropenum .    7.  Palliative following.           BARRIERS  1.  Acute hypoxic respiratory failure due to subarachnoid hemorrhage.  Vent / trach / wean.    2.  Acute metabolic encephalopathy.    3.  Oropharyngeal dysphagia.    3.   Bilateral mitts for safety.        Disposition: TCU  Care Manager Name:  Jacqueline Vieyra RN,BSN, HNB-BC    Date/Time:  21 9:44 AM

## 2021-06-26 NOTE — PLAN OF CARE
Problem: Pain  Goal: Patient's pain/discomfort is manageable  Outcome: Progressing     Problem: Safety  Goal: Patient will be injury free during hospitalization  Outcome: Progressing     Problem: Daily Care  Goal: Daily care needs are met  Outcome: Progressing   Vital signs were stable. Pt. slept most of the shift. Pt. was repositioned every 2 hours. Pt. is still on restraints hand mitts X 2 for safety. Continue to monitor.  Margot Sargent RN

## 2021-06-26 NOTE — PROGRESS NOTES
[facility] Pulmonary Medicine - Progress Note  6/21/2021    Assessment:  Principal Problem:    SAH (subarachnoid hemorrhage) (H)  Active Problems:    Acute respiratory failure with hypoxia (H)    ESBL (extended spectrum beta-lactamase) producing bacteria infection    Attention to tracheostomy (H)    Acute and chronic respiratory failure with hypoxia (H)    On mechanically assisted ventilation (H)    70 y.o. 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 Course c/b bilateral BAYLEE infarct with vasospasm. Extubated on 6/3/2021 and re-intubated on 6/6/2021 due to hypoxemia. On 6/7/2021, patient underwent tracheostomy and PEG tube placement. Treated for klebsiella and pseudomonal VAP, ESBL.    Recommendations/Plans:    Continue phase 2 weans    Continue duonebs    Completed cefepime course    Trach: 6 Shiley, placed 6/7/21, no acute need to down-size the trach since he is tolerating PMV w/o issues    SLP following, appreciate recommendations    6/12 BDT - no immediate aspiration, doing supervised in-line PMV    Discussed my impressions and recommendations for care with RT.    Nia Bains MD  Pulmonary and Critical Care      -------------    Subjective  No acute events  Follows simple commands, variable mentation    Current FiO2 (%): 25 % Current PEEP: +5    Tracheal secretions: frequent, small to large    Current phase of ventilator weaning pathway:  Phase 2  Ventilator weaning results from yesterday: 9 hours    Physical Exam:  Temp:  [97.7  F (36.5  C)-98.7  F (37.1  C)] 98.1  F (36.7  C)  Heart Rate:  [68-85] 78  Resp:  [18-28] 28  BP: (114-133)/(68-83) 114/68  FiO2 (%):  [25 %-33 %] 25 %    GEN: NAD, resting in bed  HEENT: MMM, trach in place  CVS: regular rhythm, no murmurs  RESP: Rhoncherous in anterior lung fields  ABD: Soft, No abdominal pain with  palpation   SKIN: Warm extremities, no visible rashes  EXT: warm, no edema  NEURO:  Grossly intact    Pertinent Labs: Lab Results: personally reviewed.     Pertinent Radiology: personally reviewed, including Radiology interpretations (as below):  CXR, 6/18:  Tracheostomy tube in place. Left basilar opacities partially silhouetting the left hemidiaphragm are unchanged and again presumed to reflect some atelectasis. Right lung is clear. Heart and pulmonary vascularity are normal. Gastrostomy tube.

## 2021-06-26 NOTE — PLAN OF CARE
Problem: Mechanical Ventilation  Goal: Patient will maintain patent airway  Outcome: Progressing  Goal: Tracheostomy will be managed safely  Outcome: Progressing  Goal: Respiratory status - ventilation  Description: Movement of air in and out of the lungs.    Liberate from ventilator  Outcome: Progressing  RT PROGRESS NOTE     DATA:     CURRENT SETTINGS:  14/450/+5             TRACH TYPE / SIZE: #6 Malcolm (Placed 06/07)             MODE:  PRVC             FIO2:   24%     ACTION:             THERAPIES: Duo-neb Q6             SUCTION: Yes                         FREQUENCY: 6x                        AMOUNT:   Moderate x1 to Large x5                        CONSISTENCY: Thick                        COLOR:   Pale yellow             SPONTANEOUS COUGH EFFORT/STRENGTH OF EFFORT (not elicited by suctioning):Yes, strong productive cough.                              WEANING PHASE:   2                        WEAN MODE:  HFTM                        WEAN TIME: 13 hours and 18 minutes. PMV used 7 hrs and 52 mins                        END WEAN REASON: Full vent at noc per order     RESPONSE:             BS:  Coarse             VITAL SIGNS: Sat 91-98%, HR 74-94, RR 18-27             EMOTIONAL NEEDS / CONCERNS:  None                RISK FOR SELF DECANNULATION:  Yes                        RISK DUE TO:  Confusion                        INTERVENTION/S IN PLACE IS/ARE: Restraints x2        NOTE / PLAN:     Pt remains on full vent and tolerating well with no distress.  Continue current care plan.

## 2021-06-26 NOTE — PLAN OF CARE
Problem: Pain  Goal: Patient's pain/discomfort is manageable  Outcome: Progressing     Problem: Daily Care  Goal: Daily care needs are met  Outcome: Progressing     Problem: Psychosocial Needs  Goal: Demonstrates ability to cope with hospitalization/illness  Outcome: Progressing   Vitals stable, denied pain or discomfort. Continues to be on restraints for his safety. Pt was repositioned every 2 hours. Requested his eye glasses on while in bed.Will continue to monitor.

## 2021-06-26 NOTE — PLAN OF CARE
"  Problem: Mechanical Ventilation  Goal: Patient will maintain patent airway  Outcome: Progressing  Goal: Tracheostomy will be managed safely  Outcome: Progressing  Goal: Respiratory status - ventilation  Description: Movement of air in and out of the lungs.    Liberate from ventilator  Outcome: Progressing   RT PROGRESS NOTE     DATA:     CURRENT SETTINGS:             TRACH TYPE / SIZE:  #6 shiley             MODE:   prvc/ac 14, 450, peep 5, 24%                 ACTION:             THERAPIES:   duo neb 6h             SUCTION:                           FREQUENCY:   x4                        AMOUNT:   large to copious                        CONSISTENCY:   normal to thin                        COLOR:   white to pale yellow             SPONTANEOUS COUGH EFFORT/STRENGTH OF EFFORT (not elicited by suctioning): moderate to strong                              WEANING PHASE:   2                        WEAN MODE:    Tm started at 07:55.  PMV started at 10:30 during speech therapy and taken off at 18:20 due to desaturations.  Cuff inflated.                                                     RESPONSE:             BS:   coarse              VITAL SIGNS:   Blood pressure 131/78, pulse 75, temperature 98.1  F (36.7  C), temperature source Axillary, resp. rate 24, height 5' 11\" (1.803 m), weight 139 lb 8 oz (63.3 kg), SpO2 96 %.                 EMOTIONAL NEEDS / CONCERNS:  no                RISK FOR SELF DECANNULATION:  yes                        RISK DUE TO:  impulsive confusion                        INTERVENTION/S IN PLACE IS/ARE:  bilateral wrist restraints       NOTE / PLAN:   pulmonary toilet.    "

## 2021-06-26 NOTE — PROGRESS NOTES
[facility] PALLIATIVE CARE PROGRESS NOTE        Chief Complaint: lethargy    Key Palliative Symptom Data:  # Pain severity in the last 12 hours: none  # Dyspnea severity in the last 12 hours: none  # Nausea severity in the last 12 hours: none  # Anxiety severity in the last 12 hours: none    Prognosis, Goals, or Advance Care Planning was addressed today with: Yes.    Mood, coping, and/or meaning in the context of serious illness were addressed today: Yes with spouse and daughter.    Chart Review/discussion with clinical unit members: I reviewed Matt's Epic record and also conferred with PT and OT as they were working with him when I first entered the room.    Palliative Encounter Summary/Comments:  I met Matt for the first time today.  He was working with PT and OT with encouragement from his wife, Susy, and daughter, Yolanda. Matt was especially attentive to Yolanda.  He was able to stand with assist for the first time since his CVA five weeks ago.  That activity took a tremendous amount of effort and clearly left him winded and tired.  Matt spoke with me a little and seemed to be listening in on my conversation with his wife and daughter  He did turn his head to try to locate Yolanda in the room and she strategically placed herself so he would need to move his head in different directions.    I focused a lot of today's visit on his wife and Yolanda.  Susy says she is doing a little better though she feels anxious.  She says she wakes up every couple of hours at night and often can fall back asleep easily and at other times finds herself ruminating about the future.  She thinks she is getting 8 total hours of sleep and there are days when she feels refreshed.  She is especially worried about Matt's cognition.  She is also worried that some days he is very with 'it' and interactive and on others very lethargic and tired.  I explained that today he was working very hard when he was standing and I'm not  surprised he is 'spent' now.  Susy worries if he'll be strong enough to be able to help care for himself and she knows she couldn't provide him with 24/7 nursing care.  She is very committed to him returning home, if at all possible.  Susy also knows it will be months before the team has a better sense of his potential neurologic recovery and while that is frustrating she recognizes that it 'is what it is.'    In terms of Matt's lethargy the primary team could consider using a stimulant (like Ritalin or Provigil) if neurology is OK with it.  Susy mentioned that some days Matt is more alert and yesterday was one of those days, so it is hard to know if he is worn out from his earlier activities or if he would benefit from some pharmacologic support.    A few other things I learned today:  Just before the pandemic hit Matt and Susy had arranged for a trip to [place] and [place] that they cancelled.  They had just begun to thinking about replanning it when Matt had his CVA.  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  He took this up when he retired and just learned by experimenting in the kitchen.  He also makes a very good torte.    Susy is very grateful for access to the ipad as she is a reluctant  on 35E.  I also explained the new visitor policy to her and Yolanda and they will pass the word to other friends and family about potentially visiting Matt.  Susy is open to calls from our , Josephine, for additional emotional support.  Today I provided some through validating her feelings and active, empathic listening.  I think she found my visit helpful.    TTS: I have personally spent a total of 48 minutes  today on the unit in review of medical record, consultation with the medical providers and assessment of patient today, with more than 50% of this time spent in counseling, coordination of care, and conversation with wife and daughter at the bedside re:   prognosis,  symptom management,  emotional support and development of plan of care.    Placido Wilkerson MD MS FAAFP  [facility] Palliative [facility]  Office [phone]  Fax [phone]

## 2021-06-26 NOTE — PROGRESS NOTES
Speech Language/Pathology  Speech Therapy Daily Progress Note    Patient presents as alert and cooperative during this session.  An  was not applicable. Wife present via IPad.    Objective  Voicing  Trach: #6 Shiley  Baseline vent parameters:  Respiratory Parameters    Mode AC   Vent PEEP 5   Vent Breaths Per Minute 14   Vent FiO2 24   Vent Vt 450   Baseline physiologic parameters:  SATS: 93  HR: 80  RR: 24  Vt: 440    Upon arrival, SLP educated patient and wife re: current plan and procedure. Patient was in agreement. SLP deflated cuff and tracheal suctioning was completed. Evidence of airflow into the upper airway was exhibited as evidenced by audible voicing. Upon cuff deflation, patient did require oral yaunker suctioning secondary to clearing of oropharyngeal secretions via coughing.     Minimal coughing noted. Vocal quality was wet. Needed moderate to max cues to initiate volitional swallow. Respiratory status was notable for stable SATS and HR throughout trial. Patient needed mod prompts to attend to and respond to questions and produce personal/biographical information.      Speaking valve was trialed for 10 minutes. Speaking valve was removed and the cuff was reinflated by Speech Pathologist. Prior to dismissal, respiratory rate was 28, and SATS were 100. Handoff with Respiratory Therapist Willis completed in room     Assessment  Demonstrates fair tolerance of speaking valve. Reduced frequency of swallowing is of some concern. Notable confusion and disorientation persist, compromised by decreased attention. Patient making steady progress. Continued skilled SLP services are warranted to optimize patient status/progress toward goals in POC. Goals in POC not targeted in current session d/t emphasis on above areas remain appropriate.     Plan/Recommendations  Continue per plan    The ST Care Plan has been reviewed and current plan remains appropriate.    35 speech/language minutes     David CASTILLO  ANTHONY Coffman, CCC-SLP

## 2021-06-26 NOTE — PROGRESS NOTES
Speech Language/Pathology  Speech Therapy Daily Progress Note    Patient presents as lethargic and awake during this session.  An  was not applicable. Family present via IPad.    Objective  Augmentative/Alternative Communication (AAC): to compensate for impaired communication of basic needs and ideas   Patient was presented with yes/no board. Unable to independently follow instructions to point to each. With hand-over-hand assistance, patient was trained in execution. Patient was again prompted to point to yes/no and patient did so x1. Unclear if this was purposeful or incidental.     Auditory comprehension: to address impairment of understanding spoken language    Patient completed simple yes/no questions with 20 % accuracy independently. Mouthed responses at times, but was unintelligible.  Patient followed 1 step  directions with 25 % accuracy independently.     Time spent educating family re: prognosis, plan.     Assessment  Patient continues with confusion and disorientation with decreased and variable attention, which compromises both expression of thoughts verbally and via AAC as well as ability to process verbal information. Given attempts to mouth, would be appropriate to attempt in-line speaking valve, with potential benefit of providing biofeedback to improve mentation and communication as well.     Plan/Recommendations  In-line speaking valve trials, awaiting order from pulmonary if appropriate    The ST Care Plan has been reviewed and current plan remains appropriate.    12 speech/language minutes and 12 assistive technology minutes     David Coffman MA, CCC-SLP

## 2021-06-26 NOTE — PLAN OF CARE
Problem: Mechanical Ventilation  Goal: Patient will maintain patent airway  Outcome: Progressing  Goal: Tracheostomy will be managed safely  Outcome: Progressing  Goal: Respiratory status - ventilation  Description: Movement of air in and out of the lungs.    Liberate from ventilator  Outcome: Progressing   RT PROGRESS NOTE     DATA:     CURRENT SETTINGS:  PRVC/AC 14, 450, +5, 35%             TRACH TYPE / SIZE: # 6 SHIPRANAV DCT  Placed  On 6/7/21             MODE:  PRVC/AC             FIO2:  35%     ACTION:             THERAPIES: Duoneb q6hr , sodium chloride two times a day, Tobramycin neb q8              SUCTION:                           FREQUENCY: X7                        AMOUNT:moderate to large                          CONSISTENCY: thick                        COLOR: white/tan             SPONTANEOUS COUGH EFFORT/STRENGTH OF EFFORT (not elicited by suctioning): weak                              WEANING PHASE: 2                        WEAN MODE:35% HFTM @30 L/min                         WEAN TIME: SINCE 07:20 tolerating                         END WEAN REASON:ongoing      RESPONSE:             BS:  Decreased coarse              VITAL SIGNS: sat 95-97%, HR 78-82, RR 22             EMOTIONAL NEEDS / CONCERNS:  no             RISK FOR SELF DECANNULATION:  no                        RISK DUE TO:                          INTERVENTION/S IN PLACE IS/ARE:        NOTE / PLAN:  Cont. Current plan / Patient wore PMV for 3 hrs 40 min -due to increased amount of secretions  removed PMV and inflated cuff of trach

## 2021-06-26 NOTE — PROGRESS NOTES
Progress Note    Brief summary:   70 y.o. old male with past medical history of hypertension who presented to ED on 5/15/2021 for altered mental status. He had a fall and was found unresponsive by his wife. He was found to have acute subarachnoid hemorrhage.  On 5/15, he underwent left external ventricular drain placement.  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 One the same day, he also underwent craniotomy with clipping of PCOM aneurysm.  On 5/28/2021, patient had persistently elevated TCD, CT head showed bilateral BAYLEE infarct.  Next day, for persistent TCD and CTA showing bilateral BAYLEE vasospasm, was treated with milrinone and verapamil.  On 6/1/2021, angiogram showed no evidence of vasospasm.  He was extubated on 6/3/2021.On 6/6/2021, patient was reintubated due to drop in his saturation.  On 6/7/2021, patient underwent tracheostomy and PEG tube placement.  On 6/9/2021, EVD was removed.  Patient was initially started on Unasyn for possible pneumonia on 5/20/2021 which was switched to ceftriaxone the next day for sputum culture growing Klebsiella.  On 5/23, sputum culture also grew Pseudomonas for which ceftriaxone was switched to Zosyn.  Due to increased white blood cell counts, possibility of ventriculitis was raised however CSF was negative but antibiotic was switched to cefepime and vancomycin on 5/28/2021 with plan to continue for 2 weeks.  Vanco has been discontinued on 6/9/2021.  On 6/5/2021, sputum grew ESBL so cefepime was switched to meropenem which he is on currently.    Assessment/Plan  Principal Problem:    SAH (subarachnoid hemorrhage) (H)  Active Problems:    Acute respiratory failure with hypoxia (H)      #Acute hypoxic respiratory failure status: Initially postoperatively but later complicated by pneumonia.  On mechanical ventilation, tracheostomy done on 6/7/2021.  Vent management per RT and pulmonology     #Recent subarachnoid hemorrhage: Presented with fall and  unresponsiveness on 5/15/2021.  S/p EVD, angiogram showing ruptured posterior communicating aneurysm followed by craniotomy with clipping of PCOM aneurysm on the same day.  EVD removed on 6/9/2021.  Postoperative period was complicated by vasospasm, bilateral BAYLEE infarct. MRI done on 5/28 showed right frontal parietal, temopral and left parasagittal parietal lobes infarcts.  On 6/1/2021, angiogram showed no vasospasm. Will need to follow up with Dr. Martinez in Neurosurgery clinic in 3 months with repeat CTA of head and neck for post-operative follow up     #Acute metabolic encephalopathy: likely due to bleed, infarcts. On restraints- mittens.     #Hospital acquired pneumonia: Sputum cultures from prior hospitalization showed Klebsiella, Pseudomonas.  Patient has been on antibiotics since 5/20/2021.  On 6/5/2021, was sputum grew ESBL for which patient is on meropenem, until 6/19/2021.     #Oropharyngeal dysphagia: On tube feeding via PEG tube, dietitian, speech consult. Video swallow 6/14     #Hypertension: On lisinopril     #Diarrhea: C diff checked at [facility] was negative. Likely abx associated diarrhea.  resolved, rectal tube out    Subjective  Patient seen and examined  Sleep this morning but easily arousable.  He was sleepy yesterday morning as well but was awake later in the day    Objective    Vital signs in last 24 hours  Temp:  [98.5  F (36.9  C)-99.8  F (37.7  C)] 98.5  F (36.9  C)  Heart Rate:  [73-99] 73  Resp:  [18-28] 20  BP: (124-144)/(72-77) 144/73  FiO2 (%):  [35 %-40 %] 35 %  Weight:   145 lb 11.2 oz (66.1 kg)    Intake/Output last 3 shifts  I/O last 3 completed shifts:  In: 1969 [NG/GT:1621; IV Piggyback:348]  Out: 2850 [Urine:2850]  Intake/Output this shift:  No intake/output data recorded.    Physical Exam   General:fatigued  Eyes: no pallor  Chest: Clear to auscultation bilaterally  Heart: RRR, normal S1 and S2  Abdomen: soft, non tender  Neuro: generalized weakness      Meds    famotidine  20  mg Enteral Tube BID     heparin (PF) ANTICOAGULANT  5,000 Units Subcutaneous Q8H FIXED TIMES     ipratropium-albuteroL  3 mL Nebulization Q6H - RT     lisinopriL  10 mg Enteral Tube Daily     meropenem (MERREM) IVPB in 100mL  2 g Intravenous Q8H     nystatin  5 mL Swish & Swallow QID       Pertinent Labs   Lab Results: personally reviewed.   not applicable    Results from last 7 days   Lab Units 06/12/21  0522   LN-SODIUM mmol/L 145   LN-POTASSIUM mmol/L 3.9   LN-CHLORIDE mmol/L 105   LN-CO2 mmol/L 31   LN-BLOOD UREA NITROGEN mg/dL 27*   LN-CREATININE mg/dL 0.70   LN-CALCIUM mg/dL 9.4         Results from last 7 days   Lab Units 06/13/21  0508 06/12/21  0522   LN-WHITE BLOOD CELL COUNT thou/uL  --  8.3   LN-HEMOGLOBIN g/dL  --  8.8*   LN-HEMATOCRIT %  --  28.0*   LN-PLATELET COUNT thou/uL 394 391         Pertinent Radiology   Radiology Results: Not yet available for review    Ct External Imaging Head    Result Date: 6/11/2021  Images were obtained from an external facility.  Click PACS Images hyperlink to view images.  Textual results have been scanned into the media tab.    Ct External Imaging Head    Result Date: 6/11/2021  Images were obtained from an external facility.  Click PACS Images hyperlink to view images.  Textual results have been scanned into the media tab.    Xr External Imaging    Result Date: 6/11/2021  Images were obtained from an external facility.  Click PACS Images hyperlink to view images.  Textual results have been scanned into the media tab.    Xr External Imaging Chest    Result Date: 6/11/2021  Images were obtained from an external facility.  Click [employer] Images hyperlink to view images.  Textual results have been scanned into the media tab.          Advanced Care Planning:  Discharge Planning discussed with patient   Anticipated LOS: TBD  Barrier to discharge:acute issues  Disposition:TBD  Discussed care with patient for >35 minutes with greater than 50% of time spent in counseling and  coordination of care.      Sheri Beltre MD  Hospitalist  [phone]    This note was dictated using voice recognition software. Any grammatical or context distortions are unintentional and inherent to the software.

## 2021-06-26 NOTE — PLAN OF CARE
Problem: Mechanical Ventilation  Goal: Patient will maintain patent airway  Outcome: Progressing  Goal: Tracheostomy will be managed safely  Outcome: Progressing  Goal: Respiratory status - ventilation  Description: Movement of air in and out of the lungs.    Liberate from ventilator  Outcome: Progressing  RT PROGRESS NOTE     DATA:     CURRENT SETTINGS: 14,450, +5              TRACH TYPE / SIZE: #6 Shiley placed on 6/7             MODE:  PRVC             FIO2:   24%     ACTION:             THERAPIES: Duo-neb Q6             SUCTION: Yes                         FREQUENCY: x6                        AMOUNT:   Moderate to copious                         CONSISTENCY: Thick/thin                         COLOR:  white/py             SPONTANEOUS COUGH EFFORT/STRENGTH OF EFFORT (not elicited by suctioning):good cough                               WEANING PHASE:   2                        WEAN MODE:  HFTM 30l/35%                        WEAN TIME: 11 hrs 50min                         END WEAN REASON: for noc      RESPONSE:             BS:  Coarse             VITAL SIGNS: Sat 92-96 %, HR 80-83 RR 18-27             EMOTIONAL NEEDS / CONCERNS:yes                RISK FOR SELF DECANNULATION:  Yes                        RISK DUE TO:  Confusion                        INTERVENTION/S IN PLACE IS/ARE: Bilateral restrains inplace        NOTE / PLAN: Pt tolerated weaning on well, no distess noted at the end of the wean. Continue current POC

## 2021-06-26 NOTE — PLAN OF CARE
Problem: Speech Therapy  Goal: SLP Goals  Description: Short-term goals to be met by 6/21/21:  Frequency: 3/wk  Patient will:  1) tolerate speaking valve x15 minutes with min cues for adequate voicing  2) participate in bedside swallow study when consistently tolerating speaking valve on trach mask      Goals initiated on 6/12/2021 by David Coffman MA, CCC-SLP.  Long-term goals to be met by 7/30/21:  Patient will tolerate safest, least restrictive diet and improve communication skills for effective interpersonal interactions       Outcome: Not Applicable this Shift       See speech eval dated 6/12/2021 for details.

## 2021-06-26 NOTE — PROGRESS NOTES
Hospitalist Progress Note    Assessment/Plan    A: Patient is a 69 yo man who has hypertension. Patient initially presented on 15-May-2021 to [facility] after being found unresponsive by his wife. Patient was then found to have a subarachnoid hemorrhage secondary to a ruptured aneurysm. Patient was intubated and was started on mannitol, IV antihypertensives and hyperventilation and patient was then transferred to [facility]. Patient underwent aneurysm clipping as well as placement of extra-ventricular drain from hydrocephalus on 15-May-2021. Extra-ventricular drain would malfunction and would require replacement on 04-Jun-2021 and 06-Jun-2021. Extra-ventricular drain reportedly was removed on 09-Jun-2021. Patient had intra-arterial vasospasm treatment with verapamil on 28-May-2021.    Patient was extubated on 16-May-2021 but had to be reintubated on 19-May-2021. Patient would be extubated on 03-Jun-2021 but would be reintubated on 06-Jun-2021. Patient had tracheostomy and PEG-tube placed on 07-Jun-2021. Patient required treatment for hospital-acquired pneumonia. Patient was transferred to LTAC on 11-Jun-2021.    P:  1.) Acute hypoxemic respiratory failure: Patient has tracheostomy in place. Monitoring respiratory status. Pulmonology seeing.  2.) Recent subarachnoid hemorrhage: Patient had EEG today and results are pending.  3.) Metabolic encephalopathy: Monitoring for safety.  4.) Hypertension: Appears controlled on lisinopril. Monitoring.  5.) Oropharyngeal dysphagia: Patient on tube feeds.   6.) Moderate protein-calorie malnutrition: Supplementing as able.      Principal Problem:    SAH (subarachnoid hemorrhage) (H)  Active Problems:    Acute respiratory failure with hypoxia (H)    ESBL (extended spectrum beta-lactamase) producing bacteria infection    Attention to tracheostomy (H)    Acute and chronic respiratory failure with hypoxia (H)    On mechanically assisted ventilation  (H)    Encounter for palliative care    Encephalopathy      Subjective    Patient was not answering questions.    Objective    Vital signs in last 24 hours  Temp:  [98.1  F (36.7  C)-99.3  F (37.4  C)] 99.3  F (37.4  C)  Heart Rate:  [74-94] 78  Resp:  [18-28] 28  BP: (117-139)/(67-82) 117/67  FiO2 (%):  [24 %-35 %] 35 % 93% O2 Device: Trach mask O2 Flow Rate (L/min): 30 L/min  Weight:   145 lb 3.2 oz (65.9 kg) Weight change: 5 lb 4.8 oz (2.404 kg)    Intake/Output last 3 shifts  I/O last 3 completed shifts:  In: 2982 [NG/GT:2982]  Out: 3025 [Urine:3025]  Body mass index is 20.25 kg/m .    Physical Exam    General:     Patient awake, NAD. Patient did not answer questions.   HEENT:     No scleral icterus or conjunctival injection.   Heart:    RRR, S1 S2 w/o murmurs.   Lungs:     Breath sounds present. No crackles/wheezes heard.   Abdomen:   Soft.     Pertinent Labs   Lab Results: personally reviewed.   Results from last 7 days   Lab Units 06/23/21  0543 06/22/21  0608 06/21/21  0619   LN-SODIUM mmol/L 140 139 141   LN-POTASSIUM mmol/L 4.1 4.3 4.0   LN-CHLORIDE mmol/L 102 102 103   LN-CO2 mmol/L 29 29 28   LN-BLOOD UREA NITROGEN mg/dL 34* 34* 36*   LN-CREATININE mg/dL 0.66* 0.66* 0.67*   LN-CALCIUM mg/dL 9.9 9.6 9.7     Results from last 7 days   Lab Units 06/23/21  0543 06/21/21  0619 06/18/21  0637   LN-WHITE BLOOD CELL COUNT thou/uL 6.2 8.1 6.1   LN-HEMOGLOBIN g/dL 9.2* 9.1* 9.0*   LN-HEMATOCRIT % 28.7* 28.8* 28.2*   LN-PLATELET COUNT thou/uL 351 284 229   LN-NEUTROPHILS RELATIVE PERCENT % 72* 76* 75*   LN-MONOCYTES RELATIVE PERCENT % 8 7 8         Results from last 7 days   Lab Units 06/23/21  1135 06/23/21  0546 06/22/21  2319 06/22/21  1757 06/22/21  1223 06/22/21  1139 06/22/21  0506 06/21/21  2353 06/21/21  1756 06/21/21  1218   LN-POC GLUCOSE FINGERSTICK- HE mg/dL 111 129 126 121 129 134 131 133 131 114       Medications  Scheduled Meds:    amino acids-protein hydrolys  1 packet Enteral Tube DAILY      famotidine  20 mg Enteral Tube BID     heparin (PF) ANTICOAGULANT  5,000 Units Subcutaneous Q8H FIXED TIMES     insulin aspart (NovoLOG) injection   Subcutaneous Q6H FIXED TIMES     ipratropium-albuteroL  3 mL Nebulization Q6H - RT     Lactobacillus rhamnosus GG  1 capsule Enteral Tube Daily with brkfst     lisinopriL  40 mg Enteral Tube Daily     menthol-zinc oxide   Topical TID     Continuous Infusions:  PRN Meds:.acetaminophen **OR** acetaminophen, alteplase, dextrose 50 % (D50W), glucagon (human recombinant), hydrALAZINE, HYDROmorphone, naloxone **OR** naloxone **OR** naloxone **OR** naloxone, sodium chloride 0.9%, sodium chloride

## 2021-06-26 NOTE — PLAN OF CARE
Problem: Pain  Goal: Patient's pain/discomfort is manageable  Outcome: Progressing     Problem: Safety  Goal: Patient will be injury free during hospitalization  Outcome: Progressing     Problem: Daily Care  Goal: Daily care needs are met  Outcome: Progressing  Pt is alert and can follow simple commands.Pt remains of mitts x2 restraints for safety. No signs of pain or discomfort noted this shift. No PRNs given.

## 2021-06-26 NOTE — PROGRESS NOTES
Pulmonary Medicine Follow up Note - Ventilator Rounds:    Clinical status discussed today with RN and respiratory therapist..    Chief complaint: Ongoing respiratory failure  Significant change in clinical status during past 24 hours? - increase trach secretions     Vent Mode: PRVC A/C  FiO2 (%):  [24 %-35 %] 35 %  S RR:  [14] 14  S VT:  [450 mL] 450 mL  PEEP/CPAP (cm H2O):  [5 cm H2O] 5 cm H2O  Minute Ventilation (L/min):  [7.9 L/min-25 L/min] 8.8 L/min  PIP:  [14 cm H2O-18 cm H2O] 14 cm H2O  MAP (cm H2O):  [7-9] 8    Tracheal secretions: moderate to copious amount  Current phase of ventilator weaning pathway:  Phase 2  Ventilator weaning results from yesterday: Tolerating weaning trial     Physical exam   Vitals:    06/24/21 0824 06/24/21 1100 06/24/21 1203 06/24/21 1354   BP:       Patient Position:       Pulse: 78 82 77 81   Resp: 22 22  20   Temp:       TempSrc:       SpO2: 95% 95% 95% 97%   Weight:       Height:         Gen: lethargic, arousable, trach  HEENT: pale conjunctiva, moist mucosa  Neck: s/p trach   Lungs: ronchi both HT  CV: regular, no murmurs or gallops appreciated  Abdomen: soft, NT, BS wnl  Ext: no edema  Neuro: lethargic arousable, tone is increase throughout    Results from last 7 days   Lab Units 06/23/21  0543   LN-WHITE BLOOD CELL COUNT thou/uL 6.2   LN-HEMOGLOBIN g/dL 9.2*   LN-HEMATOCRIT % 28.7*   LN-PLATELET COUNT thou/uL 351     Results from last 7 days   Lab Units 06/24/21  0530   LN-SODIUM mmol/L 140   LN-POTASSIUM mmol/L 4.3   LN-CHLORIDE mmol/L 103   LN-CO2 mmol/L 28   LN-BLOOD UREA NITROGEN mg/dL 34*   LN-CREATININE mg/dL 0.70   LN-CALCIUM mg/dL 10.0     Culture 4+ Pseudomonas aeruginosaAbnormal     Reported and sent to MD as part of the  Emerging Infections Surveillance Program.          Gram Stain Result  4+ Polymorphonuclear leukocytes      2+ Gram negative bacilli            Resulting Agency: [facility]   Susceptibility     Pseudomonas aeruginosa     SUSNA     Aztreonam >16  Resistant      Cefepime >16  Resistant     Ceftazidime >16  Resistant     Ciprofloxacin 2  Resistant     Gentamicin <=2  Sensitive     Levofloxacin 4  Resistant     Meropenem >8  Resistant     Piperacillin + Tazobactam >64/4  Resistant     Tobramycin <=2  Sensitive         XR CHEST 1 VIEW PORTABLE  LOCATION: [facility]  DATE/TIME: 6/18/2021 4:37 PM     INDICATION: increased secretions  COMPARISON: 06/14/2021, 06/09/2021     IMPRESSION:   Tracheostomy tube in place. Left basilar opacities partially silhouetting the left hemidiaphragm are unchanged and again presumed to reflect some atelectasis. Right lung is clear. Heart and pulmonary vascularity are normal. Gastrostomy tube.    Diagnosis:     Patient is a 69 yo man with history of hypertension. Patient initially presented on 15-May-2021 to [facility] after being found unresponsive by his wife. Patient was then found to have a subarachnoid hemorrhage secondary to a ruptured aneurysm. Patient was intubated and was started on mannitol, IV antihypertensives and hyperventilation and patient was then transferred to [facility]. Patient underwent aneurysm clipping as well as placement of extra-ventricular drain from hydrocephalus on 15-May-2021. Extra-ventricular drain would malfunction and would require replacement on 04-Jun-2021 and 06-Jun-2021. Extra-ventricular drain reportedly was removed on 09-Jun-2021. Patient had intra-arterial vasospasm treatment with verapamil on 28-May-2021.  Patient was extubated on 16-May-2021 but had to be reintubated on 19-May-2021. Patient would be extubated on 03-Jun-2021 but would be reintubated on 06-Jun-2021. Patient had tracheostomy and PEG-tube placed on 07-Jun-2021. Patient required treatment for hospital-acquired pneumonia. Patient was transferred to LTAC on 11-Jun-2021.    1. Acute respiratory failure s/p trach 6/7/2021  2. Pseudomonas acute tracheobronchitis   3. Encephalopathy    4. Subarachnoid bleed s/p craniotomy and clipping P-comm rupture aneurysm.   5. HTN  6. Malnourishment   7. Dysphagia s/p G tube    Recommendations/Plans (MDM):  1. Weaning orders: continue phase 2 weaning trial   2. Trach change? no  3. Diagnostic testing? no  4. Start antibiotics RUTH nebs   5. Continue pulmonary toiletting scheduled albuterol and saline nebs  6. DVT prophylaxis heparin SQ    Marcelino Navarro  Pulmonary / Critical Care  6/24/2021   4:27 PM

## 2021-06-26 NOTE — PROGRESS NOTES
INPATIENT CLINICAL SOCIAL WORK PSYCHOSOCIAL ASSESSMENT    PRIMARY DECISION MAKER FOR HEALTHCARE  Primary Decision Maker: Surrogate Decision Maker(spouse)  Reason for Surrogate Decision Maker: medical/cognitive condition    EMERGENCY CONTACT/S  Name Home Phone Work Phone Mobile Phone Relationship Lgl HOLLY Cole [phone] Spouse        HEALTHCARE DIRECTIVE  Advance Directive: Patient does not have advance directive  Advance Directive Information Requested?: Patient would not like information    PRESENTING REASON FOR HOSPITALIZATION:  Dayron Neri is a 70 y.o. male with past medical history of hypertension who presented to ED on 5/15/2021 for altered mental status. He had a fall and was found unresponsive by his wife. He was found to have acute subarachnoid hemorrhage.  On 5/15, he underwent left external ventricular drain placement.  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 One the same day, he also underwent craniotomy with clipping of PCOM aneurysm.  On 5/28/2021, patient had persistently elevated TCD, CT head showed bilateral BAYLEE infarct.  Next day, for persistent TCD and CTA showing bilateral BAYLEE vasospasm, was treated with milrinone and verapamil.  On 6/1/2021, angiogram showed no evidence of vasospasm.  He was extubated on 6/3/2021.On 6/6/2021, patient was reintubated due to drop in his saturation.  On 6/7/2021, patient underwent tracheostomy and PEG tube placement.  On 6/9/2021, EVD was removed.  Patient was initially started on Unasyn for possible pneumonia on 5/20/2021 which was switched to ceftriaxone the next day for sputum culture growing Klebsiella.  On 5/23, sputum culture also grew Pseudomonas for which ceftriaxone was switched to Zosyn.  Due to increased white blood cell counts, possibility of ventriculitis was raised however CSF was negative but antibiotic was switched to cefepime and vancomycin on 5/28/2021 with plan to continue for 2 weeks.  Vanco  has been discontinued on 6/9/2021.  On 6/5/2021, sputum grew ESBL so cefepime was switched to meropenem which he is on currently.  History is provided by EMR and signout from previous provider        LEGAL  Legal Status: None    FINANCIAL  Payor/s # [id number] - Primary Payer     Payer Plan    [facility] MEDICARE ADVANTAGE      COBRA Eligible?: No  VA Benefits? : No     Medical Assistance: No  [facility] Financial Responsibility: Not Applicable  Referral to Financial SW: No    OCCUPATION / EDUCATION  Type of Employment: Retired   Type of Occupation:   Highest Education: HS    SPIRITUAL / CULTURAL  Identified Jew: Latter day  Traditions/Cultural Practices that Help: Yes (Comment)    CHEMICAL /PSYCHIATRIC BEHAVIORAL HEALTH  Concerns of substance / chemical use? : No         Concerns for mental/behavioral wellbeing? : No    HOME / LIVING ENVIRONMENT  Living Arrangements: Spouse/significant other  Type of Residence: Private residence  Type of Home/Layout: Single family home, Split level/walkout(3 stairs outside, 6 up/6 down inside)    DAILY ROUTINE / FUNCTIONAL STATUS  ADL / IADL Limits: No    Drives?: Yes  Prior Home Equipment: None    COMMUNITY SERVICES  Community Services : None  Primary Care Provider: Provider, No Primary Care    FAMILY / SOCIAL SUPPORT  Primary Caregiver: Self  Identified Primary Caregiver in anticipation of a discharge home with aftercare needs: Spouse  Support Systems: Spouse/significant other    ANTICIPATED DISCHARGE NEEDS  Anticipated discharge needs: TBD  SW will partner with pt & family on discharge plans. Will provide them a list of Medicare-certified SNFs and/or HHAs once post-acute care needs are determined.    SUBJECTIVE ASSESSMENT  Patient is from home.  Patient is .  Patient and spouse are both retired.  Patient lives in private home-split-level home. There are stairs inside and out.    Per spouse, she has 1 daughter in the area, and other family support.       PLAN / FOLLOW-UP  Writer completed What To Expect meeting with patient/family.  LTACH staffing and services were explained, and the ELOS/Target stay of days.    A What to Expect Meeting was held on 06/14/21 @ 0900 and a Care Progression Meeting is scheduled for 06/21/21@ 1030 in pt's room. SW discussed care progression and average length of stay. Explained possible disposition options and SW role. SW will follow for support, resources, and discharge planning.  Patient's exact discharge date, time, disposition TBD.  SW will continue to follow for psychosocial and emotional support of patient and family. SW to facilitate discharge to TCU when pt no longer requires LTACH level of care.            Dayron Chen

## 2021-06-26 NOTE — PROGRESS NOTES
Clinical Nutrition Therapy Follow Up Note    RD received consult for nutrition risk since pt has lost weight since admission.    Current Nutrition Prescription:   Diet: TF, no tray with Osmolite 1.5 at 65 mL/hour and 400 mL FWF q 4 hours  MD is managing flushes.    Current Nutrition Intake:  Pt has a PEG tube which was placed 6/7/21.    Current TF regimen provides 1560 mls, 2340 kcals, 98 grams protein, 0 grams fiber, 318 grams carbohydrate,  1189 mls free water from formula (total 3589 mL between formula and FWF).    D5 at 75 mL/hour provides 306 kcals and 1800 mL fluid per day.    SLP saw pt yesterday and recommends a BSS when appropriate.    Anthropometrics:  Admission weight: 145 lb per RD note on 6/12  Weight: 145 lb 11.2 oz (66.1 kg)-bed  There is a weight of 149 lb on 6/11 as well, but this is likely an error, as it is higher than other recent weights.    GI Status/Output:   BM x1 6/12-6/14.   BMs have been loose or watery the last few days.  Recent RD note reports that loose stools are likely due to antibiotics.    Skin/Wound:  Per WOC note 6/14, Coy Score is 13. Scalp incisions noted.    Medications:  Pepcid, merrem, K replacement    Labs:  Na 147-MD note today says Na will continue to be trended  K 3.4  BUN 35  Cr 0.69  , 114, 132    Estimated Nutrition Needs:  Assessment weight is 66 kg, current weight     Energy Needs: 1650 to 1980 kcals daily, 25 to 30 kcal/kg  Protein Needs: 85 to 100 mg daily, 1.3 to 1.5 g/kg (20% kcals)   Fluid Needs: 6149-7299 mls daily, 25-30 mls/kg actual weight    Malnutrition: Noted previously-moderate    Nutrition dx:   Swallowing difficulty r/t respiratory failure as evidenced by requiring enteral nutrition-not progressing    Goal Status:  Meet estimated nutrition needs via TF-met  New 6/15: maintain weight, advance diet    Intervention:  RD ordered Culturelle today due to diarrhea that is likely due to antibiotics.    Continue current TF  regimen.    Monitoring/Evaluation:  TF tolerance, weight, labs, stooling, ability to advance diet    Odette Darby RD, LD

## 2021-06-26 NOTE — PLAN OF CARE
Problem: Pain  Goal: Patient's pain/discomfort is manageable  Outcome: Progressing     Problem: Safety  Goal: Patient will be injury free during hospitalization  Outcome: Progressing     Problem: Daily Care  Goal: Daily care needs are met  Outcome: Progressing     Problem: Potential for transmission of organism by Contact, Enteric, Droplet and/or Airborne routes  Goal: Prevent transmission of organisms  Outcome: Progressing     Problem: Risk of Injury Due to Unsafe Behavior  Goal: Patient will remain safe while in restraint; physical/psychological needs will be met  Outcome: Progressing    Pt was up in chair twice this shift and attended therapies. He tolerated the daily activities. He was sleepy most of the day. He is awake with therapies only. No S/S of pain observed. Had US, urine sample sent. Wife is on I-pad and updated.

## 2021-06-26 NOTE — PLAN OF CARE
Problem: Pain  Goal: Patient's pain/discomfort is manageable  Outcome: Progressing         Slept  more than 6 hours the whole night, no signs of pain or discomfort.      Problem: Safety  Goal: Patient will be injury free during hospitalization  Outcome: Progressing     Problem: Risk of Injury Due to Unsafe Behavior  Goal: Patient will remain safe while in restraint; physical/psychological needs will be met  Outcome: Progressing           Bilateral mitts in place for safety, monitored.

## 2021-06-26 NOTE — PLAN OF CARE
Problem: Risk of Injury Due to Unsafe Behavior  Goal: Patient will remain safe while in restraint; physical/psychological needs will be met  Outcome: Progressing  Goal: Alternatives to restraint will be continually assessed with use of least restrictive device and discontinuation as soon as possible  Outcome: Progressing  Patient is impulsive with poor judgment.  Restraints remain justified.  However, his hands are so large that the mitts are too tight and his palms are becoming reddened.  The redness is blanchable.  Order changed to soft limb x 2 which has been effective and is more gentle with his skin.    Problem: Potential for Compromised Skin Integrity  Goal: Skin integrity is maintained or improved  Outcome: Progressing  Patient is incontinent of urine.  Condom catheter functions poorly due to patient being uncircumcised.  Primo fit catheter is in place and functioning well.  600 ml out this evening.  Perineal skin is reddened but blanchable and intact.  Goal: Nutritional status is improving  Outcome: Progressing  Tube feeding running as ordered.    Patient continues to have copious secretions oozing around his trach.  MD aware.  CXR and procalcitonin was done.  Both were stable/wnl.  WBC wnl and temp wnl.    Lab Results   Component Value Date    WBC 6.1 06/18/2021     Temp Readings from Last 3 Encounters:   06/18/21 98.5  F (36.9  C) (Axillary)         Latricia Villatoro RN

## 2021-06-26 NOTE — PLAN OF CARE
Problem: Mechanical Ventilation  Goal: Patient will maintain patent airway  Outcome: Progressing  Goal: Tracheostomy will be managed safely  Outcome: Progressing  Goal: Respiratory status - ventilation  Description: Movement of air in and out of the lungs.    Liberate from ventilator  Outcome: Progressing    RT PROGRESS NOTE     DATA:     CURRENT SETTINGS: PRVC/AC 14/450/+5             TRACH TYPE / SIZE: 6 Shiley placed on 06/07/21             MODE:  PRVC/AC             FIO2:   24%     ACTION:             THERAPIES:   DUO NEB Q6             SUCTION:                           FREQUENCY: X 4                        AMOUNT: Moderate                         CONSISTENCY: Thin                        COLOR: White/Yellow              SPONTANEOUS COUGH EFFORT/STRENGTH OF EFFORT (not elicited by suctioning): Yes/Strong                               WEANING PHASE: 2                        WEAN MODE:  TM HF CUFF UP                        WEAN TIME:  8 HRS 40 MINUTES , 10:50 am to 19:30                        END WEAN REASON: tired      RESPONSE:             BS:  Coarse              VITAL SIGNS: O2  SAT 95-98%, HR 70-78, RR  20-26             EMOTIONAL NEEDS / CONCERNS: confusion               RISK FOR SELF DECANNULATION:  Possible                         RISK DUE TO:  Confuse                        INTERVENTION/S IN PLACE IS/ARE: Bilateral restraints .   NOTE / PLAN:        Plan is to resume phase 2 tm cuff inflated days , vent support at night.

## 2021-06-26 NOTE — PROGRESS NOTES
06/12/21 1205   Visit Specifics   Eval Type Inital eval   Restraint Reapplied  Yes  (B mitts)   Treatment Time   Therapeutic Exercise 15   General   Onset date 05/15/21   Additional Pertinent History per MD note: Recent subarachnoid hemorrhage: Presented with fall and unresponsiveness on 5/15/2021.  S/p EVD, angiogram showing ruptured posterior communicating aneurysm followed by craniotomy with clipping of PCOM aneurysm on the same day.  EVD removed on 6/9/2021.  Postoperative period was complicated by vasospasm, bilateral BAYLEE infarct. MRI done on 5/28 showed right frontal parietal, temopral and left parasagittal parietal lobes infarcts.  On 6/1/2021, angiogram showed no vasospasm. Will need to follow up with Dr. Martinez in Neurosurgery clinic in 3 months with repeat CTA of head and neck for post-operative follow up. Acute hypoxic respiratory failure status: Initially postoperatively but later complicated by pneumonia.  On mechanical ventilation, tracheostomy done on 6/7/2021.  Vent management per RT and pulmonology   Chart Reviewed Yes   PT/OT Patient/Caregiver Stated Goals did not state   Family/Caregiver Present Yes  (wife/dtr)   Precautions   General Precautions Bed alarm/Tab alarm   Home Living   Type of Home House   Home Layout Bed/bath upstairs;Able to live on main level with bedroom/bathroom;Stairs to enter with rails   Bathroom Shower/Tub Tub/shower unit  (walk-in shower avail- needs repair)   Bathroom Toilet Standard   Bathroom Accessibility Accessible   Additional Comments split level home   Prior Status   Independent With All ADL's/IADL's   Prior Device Use None of the above   Indoor Mobility Independent   Lives With Spouse   Receives Help From Family   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 )   Device Used No   Leisure Interests Exercise;Gardening;Walking   Activity/Exercise History   (very active-walked 2-3 miles daily/biked)   ADL   Grooming All grooming   All Grooming Dependent/Total assist;Max  assist;Hand over hand assist;Reclined in bed;Cues for technique;Cues for sequencing;Cues for thoroughness;Step by step instructions;Fatigue;Increased effort;Increased time to complete;Increased processing time;Weakness;Decreased hand coordination;Decreased cognition   Bathing All bathing   All Bathing Dependent/Total assist   Upper Body Dressing All upper body dressing   All Upper Body Dressing Dependent/Total assist   Lower Body Dressing All lower body dressing   All Lower Body Dressing Dependent/Total assist   ADL Comments Pt R dominant. Attempting to use RUE for face washing. TH provided hand over hand samantha w/R-L UE to complete grooming. Limited by decresaed motor planning/strength/coordin and cog processing.    Activity Tolerance   Endurance Poor   Balance   Sitting Balance Dependent/total assist   Bed Mobility   Bed mobility comments all dep   Functional Transfers   Comments all dep, escobar lift   Cognition   Overall Cognitive Status Impaired   Arousal/Alertness Alert   Attention Span 1-3 min;4-8 min;With cues to redirect   Memory Impaired   Orientation Level Oriented to person   Following Commands Follows one step commands;With increased time;With repetition  (slow to process directions at times. )   Problem Solving Assistance required to generate solutions   Comments Pt nodding head for no repsonses, attempting to mouth words- difficult to read lips.    Vision-Basic Assessment   Current Vision Wears glasses all the time   Glasses on During Eval/Cognitive Testing Yes   Vision/Perception   Vision/Perception Impaired   Comments Limited visual scanning. Pt scanning to R x1, midline. Scanning mostly compelted with head mvmt verses indep eye movement. Monitor for field cut/neglect. Neck ROM presenting with tightness and limited flex/rotation, more so to L vs R.    RUE Assessment   Comments PROM-flex ~110*, all others WFL. AAROM noted through all planes except shld flex/adb. Improved AAROM with gravity eliminated.  MMT 1/5   LUE Assessment   Comments PROM-flex ~120*, all others WFL. AAROM noted through all planes except shld flex/adb. Improved AAROM with gravity eliminated. MMT 1/5   Hand Function   Gross Grasp Impaired   Gross Motor Coordination Impaired   Fine Motor Coordination Impaired   Sensation   Light Touch No apparent deficits   Skin Integrity   Edema None   Assessment   Assessment Decreased ADL status;Decreased funtional mobility;Decreased cognition;Decreased UE ROM;Decreased UE strength;Decreased Safe judgement during ADL;Decreased endurance;Visual deficit   Prognosis Good   Treatment/Interventions ADL training;Functional transfer training;Cognitive training;Neuromuscular re-ed;Sensory integration  (activity tolerance building)   OT Frequency 6x/week   Goal Formulation Patient;Family   Recommendations   OT Discharge Recommendation Acute rehab facility-patient can tolerate 3 hours of therapy daily   Treatment Suggestions for Next Session 6/12; face level grooming, BUE stengthing/FMC/GMC tasks, following directions/attention to tasks/orientation, further assessing- monitoring of visual skills. sitting balance(shld off back of chair/core strengthening), wt bearing tasks.    OT Summary Orders recieved for OT eval. Completed and POC initiated. Pt presenting with decreased strength/coordin/endurance for daily tasks. Pt IND and very active prior to hosp. Approp for skilled OT to address these areas for safe d/c and incr IND    Patient will demonstrate the following by 7/16/21, in order to maximize independence with ADL/IADL performance:      -Demonstrate safety with Bed/Chair/Toilet transfer with mod A   -Complete UB dressing with min A,caryn tech as approp   -Participate in grooming/hygiene tasks with CGA in sitting  -Pt to demo bed mobility with max A  -Pt to tolerate sitting EOB w/max A 8 minutes   -Patient will participate in further cognitive testing in order to assist with safe discharge planning  -Patient will  participate in further visual assessing  in order to assist with safe discharge planning   -Patient will participate in 15 minutes of UE Exercise/activity to increase activity tolerance for daily tasks/trf   -Pt to attend to therapy task 15 minute w/min A-vc for ease of ADL completion.

## 2021-06-26 NOTE — PLAN OF CARE
Problem: Mechanical Ventilation  Goal: Patient will maintain patent airway  Outcome: Progressing  Goal: Tracheostomy will be managed safely  Outcome: Progressing  Goal: Respiratory status - ventilation  Liberate from ventilator  Outcome: Progressing     RT PROGRESS NOTE     DATA:   CURRENT SETTINGS:   AC 14, 450, +5, 24%               TRACH TYPE / SIZE:  #6 Shiley DCT     ACTION:             THERAPIES:   Duonebs Q6               SUCTION:   x4 in 12hrs by RN/RT for mod-lg amts of pale yellow/grn secretions. Fair, spont cough.                  WEANING PHASE:   2 (New order today.)      Vent wean delayed d/t ventilator malfunction.      Vent wean w/ PS+8 reji 2hrs ([id number]) w/ spont RR~25, and spont Vt~500. Wean ended at RN's request d/t HTN. BP decr'd post return to full vent support.     RESPONSE:             BS:   Coarse.             VITAL SIGNS:   HR 76-82, RR 15-27, Sats 93-97%             EMOTIONAL NEEDS / CONCERNS:   Minimal.                RISK FOR SELF DECANNULATION:  Yes.                        RISK DUE TO:  Confusion.                        INTERVENTION/S IN PLACE IS/ARE:  Bilat mitten restraints.       NOTE / PLAN:   Continue resp support as needed, monitor closely, & wean as reji. Attempt TM wean tomorrow as ordered.

## 2021-06-26 NOTE — PLAN OF CARE
Problem: Mechanical Ventilation  Goal: Patient will maintain patent airway  Outcome: Progressing     Problem: Mechanical Ventilation  Goal: Respiratory status - ventilation  Description: Movement of air in and out of the lungs.    Liberate from ventilator  Outcome: Progressing     Problem: Risk of Injury Due to Unsafe Behavior  Goal: Alternatives to restraint will be continually assessed with use of least restrictive device and discontinuation as soon as possible  Outcome: Progressing   Patient maintained patent airway all shift, he had period of decrease heart rate and HR returned within normal range after being suctioned by RT. He continue on restraints x2  as order, will continue to monitor.

## 2021-06-26 NOTE — PLAN OF CARE
Problem: Safety  Goal: Patient will be injury free during hospitalization  Outcome: Progressing    Continues with soft mitts x 2. Has not been very impulsive and may be able to safely dicontinue soon after brain function improves. Family likes the reassurance as well      Problem: Psychosocial Needs  Goal: Collaborate with patient/family/caregiver to identify patient specific goals for this hospitalization  Outcome: Progressing    Plan calls with family and discussed ipad. Wife is going to take a day off from visiting tomorrow

## 2021-06-26 NOTE — PLAN OF CARE
Problem: Mechanical Ventilation  Goal: Patient will maintain patent airway  Outcome: Progressing  Goal: Tracheostomy will be managed safely  Outcome: Progressing  Goal: Respiratory status - ventilation  Description: Movement of air in and out of the lungs.    Liberate from ventilator  Outcome: Progressing  RT PROGRESS NOTE     DATA:     CURRENT SETTINGS:  14/450/+5             TRACH TYPE / SIZE: #6 Malcolm (Placed 06/07)             MODE:  PRVC             FIO2:   24%     ACTION:             THERAPIES: Duo-neb Q6             SUCTION: Yes                         FREQUENCY: 7x                        AMOUNT:   Large x3 to Copious x4                        CONSISTENCY: Thick                        COLOR:   Pale yellow             SPONTANEOUS COUGH EFFORT/STRENGTH OF EFFORT (not elicited by suctioning): Yes, strong productive cough.                              WEANING PHASE:   2                        WEAN MODE:  HFTM                        WEAN TIME:  12 hours and 23 minutes                        END WEAN REASON: Full vent at noc     RESPONSE:             BS:   Coarse             VITAL SIGNS: Sat 94-99%, HR 74-89, RR 19-27             EMOTIONAL NEEDS / CONCERNS:  None             RISK FOR SELF DECANNULATION:  Yes                        RISK DUE TO: Confusion                        INTERVENTION/S IN PLACE IS/ARE: Restraints x2       NOTE / PLAN:      Pt remains on full vent and tolerating well with no distress.  Pt has lots of secretions and requires frequent suctions.  Continue current care plan.

## 2021-06-26 NOTE — PROGRESS NOTES
Clinical Nutrition Therapy Follow Up Note    RD talked to pt's wife and daughter and explained the current nutrition intervention and changes that will be made to it. His wife is still concerned about his weight.    Pt is currently on phase 2 weaning trial.     Current Nutrition Prescription:   Diet: TF, no tray with Osmolite 1.5 at 75 mL/hour and 370 mL FWF q 4 hours, 1 packet NC proSource daily    Current Nutrition Intake:  Pt has a PEG tube which was placed 6/7/21.    Current TF regimen provides 1800 mL formula, 2760 kcals, 128 g protein, 367 g CHO, 88 g fat, 0 g fiber, 1372 mL fluid (total 3592 kcals between formula and flushes).    SLP saw pt yesterday and recommends BSS if pt tolerating PMV and is alert.    Anthropometrics:  Admission weight: 145 lb per RD note on 6/12  Weight: 145 lb 3.2 oz (65.9 kg)-bed  Bedscale was zeroed on 6/21, and he weighed 139 lb. This suggests that his accurate weight is actually 139 lb and not 145.    The pt's wife reports that he was underweight even before being admitted to [facility]. He walked 2-3 miles a day and was not a big eater; it was a struggle for him to gain weight.    Per chart he weighed 156 lb 6/7/21 and 151 lb 6/11/21.     GI Status/Output:   BM 6/22, noted to be loose.  BMs have been loose the last few days.    Skin/Wound:  Red, blanchable perineum and buttocks noted.    Medications:  Pepcid, culturelle, merrem, K replacement    Labs:  BUN 34-worse  Cr 0.66-steady  , 129, 111    Estimated Nutrition Needs:  Assessment weight is 63 kg, most accurate weight (as of 6/23) (81% IBW of 172 lb)     Energy Needs: 1613-0859 kcals daily, 40-45 kcal/kg   Protein Needs: + mg daily, 1.5-2+ g/kg   Fluid Needs: 5036-4228 mls daily, 25-30 mls/kg     Malnutrition: Noted previously-moderate    Nutrition dx:   Swallowing difficulty r/t respiratory failure as evidenced by requiring enteral nutrition-not progressing    Goal Status:  Meet estimated nutrition needs via  TF-progressing  Maintain weight-not met-weight continues to decrease   Advance diet-not progressing    Intervention:  To promote weight gain in a pt who is 81% IBW, RD will increase TF rate to 80 mL/hour; continue using Osmolite 1.5. Continue giving one packet NC ProSource per day to better meet protein needs and change FWF to 350 mL q 4 hours. Add three packets Benefiber (one packet three times a day to help with loose stools).  This regimen with modulars will provide 1920 mL formula, 2988 kcals, 136 g protein, 404 g CHO, 94 g fat, 9 g fiber, 1463 mL fluid (total 3563 mL between formula and flushes).    RD wrote sticky note to nursing requesting that bed is zeroed whenever possible before taking pt's weight to ensure accuracy.    Monitoring/Evaluation:  TF tolerance, weight, labs, stooling, ability to advance diet    Odette Darby RD, LD

## 2021-06-26 NOTE — PLAN OF CARE
Problem: Mechanical Ventilation  Goal: Patient will maintain patent airway  Outcome: Progressing  Goal: Tracheostomy will be managed safely  Outcome: Progressing  Goal: Respiratory status - ventilation  Outcome: Progressing     RT PROGRESS NOTE     DATA:   CURRENT SETTINGS:   AC 14, 450, +5, 24%               TRACH TYPE / SIZE:  #6 Shiley DCT     ACTION:             THERAPIES:   Duoneb tx's Q6               SUCTION:   x6 in 12hrs by RN/RT for lg amts of py secretions. Good, spont cough.               WEANING PHASE:   2      TM weaning trial held d/t secretions & relatively short vent weaning trial yesterday.      Vent wean w/ PS+8 reji well for 9hrs 10min (4303-0200) w/ spont RR 23-29, and spont Vt 380-460. Wean ended d/t incr'd RR in 30's.     RESPONSE:             BS:   Coarse.             VITAL SIGNS:   HR 95-75-84, DN76-11-76, Sats 100-93-96%.               EMOTIONAL NEEDS / CONCERNS:   Minimal.                RISK FOR SELF DECANNULATION:  Yes.                        RISK DUE TO:   Confusion.                        INTERVENTION/S IN PLACE IS/ARE:   Bilat soft wrist restraints.       NOTE / PLAN:   Continue resp support as needed, monitor closely, & wean as reji. Attempt TM wean as tolerated.

## 2021-06-26 NOTE — PLAN OF CARE
Problem: Occupational Therapy  Goal: OT Goals  Description: Patient will demonstrate the following by 7/23/21, in order to maximize independence with ADL/IADL performance:      -Demonstrate safety with Bed/Chair/Toilet transfer with mod A   -Complete UB dressing with min A,caryn tech as approp   -Participate in grooming/hygiene tasks with CGA in sitting  -Pt to demo bed mobility with max A  -Pt to tolerate sitting EOB w/max A 8 minutes-progressing   -Patient will participate in further cognitive testing in order to assist with safe discharge planning  -Patient will participate in further visual assessing  in order to assist with safe discharge planning   -Patient will participate in 15 minutes of UE Exercise/activity to increase activity tolerance for daily tasks/trf   -Pt to attend to therapy task 15 minute w/min A-vc for ease of ADL completion.     Goals reviewed and remain appropriate on 6/17/2021 by Dahiana Connelly OT  Goals reviewed/remain approp on 6/23/21 by Manuelito Liu OTRL/CBIS         Outcome: Not Progressing

## 2021-06-26 NOTE — PLAN OF CARE
Problem: Mechanical Ventilation  Goal: Patient will maintain patent airway  Outcome: Progressing  Goal: Tracheostomy will be managed safely  Outcome: Progressing  Goal: Respiratory status - ventilation  Description: Movement of air in and out of the lungs.    Liberate from ventilator  Outcome: Progressing     RT PROGRESS NOTE     DATA:     CURRENT SETTINGS: PRVC/AC 14/450/+5             TRACH TYPE / SIZE: #6 Shiley placed on 06/07/21             MODE:  PRVC/AC             FIO2:   30%     ACTION:             THERAPIES:   DUO NEB Q6             SUCTION:                           FREQUENCY: X5                        AMOUNT:   Moderate                        CONSISTENCY:  Thick                        COLOR:   Yellow/white             SPONTANEOUS COUGH EFFORT/STRENGTH OF EFFORT (not elicited by suctioning): Yes:Strong                              WEANING PHASE: #1                        WEAN MODE:    PS 10/5                        WEAN TIME:   11 hrs and 13 min                        END WEAN REASON:  to rest at night     RESPONSE:             BS:  Coarse             VITAL SIGNS:   SAT %, HR 71-78, RR 14-25             EMOTIONAL NEEDS / CONCERNS: N/A              RISK FOR SELF DECANNULATION:  Yes                        RISK DUE TO:  Confuse                        INTERVENTION/S IN PLACE IS/ARE: Bilateral mitts     NOTE / PLAN:   Pt is on full vent support, reji well.  Cont weaning on ps 8-15 days as reji and full vent at night and keep sat >/=90%

## 2021-06-26 NOTE — PROGRESS NOTES
[facility] Pulmonary Medicine - Progress Note    Assessment:  Principal Problem:    SAH (subarachnoid hemorrhage) (H)  Active Problems:    Acute respiratory failure with hypoxia (H)    ESBL (extended spectrum beta-lactamase) producing bacteria infection    70 y.o. 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 Course c/b bilateral BAYLEE infarct with vasospasm. Extubated on 6/3/2021 and re-intubated on 6/6/2021 due to hypoxemia. On 6/7/2021, patient underwent tracheostomy and PEG tube placement. Treated for klebsiella and pseudomonal VAP, ESBL.    Recommendations/Plans:    Continue phase 1 weans    Contineu duonebs    Completing course of cefepime, currently no s/s of acute infection    Trach: 6 Malcolm, placed 6/7/21. Trach change/downsize at 10-14 days, depending on progress with PS weaning    SLP consulted and following, not ready for swallow at this time      Discussed my impressions and recommendations for care with RT.      Jacklyn Howard MD  Pulmonary and Critical Care Medicine  [facility]  Office: [phone]    -------------    Subjective  In wheelchair, doing PS wean  Spoke with wife via ipad  Has been on 1/5 since 9:30 am    Current FiO2 (%): 30 % Current PEEP: +5    Tracheal secretions: x 4 moderate, small    Current phase of ventilator weaning pathway:  Phase 1  Ventilator weaning results from yesterday: 6 hours     Physical Exam:  Vitals:    06/15/21 0510 06/15/21 0525 06/15/21 0737 06/15/21 0924   BP:   146/81    Patient Position:   Lying    Pulse: 70  72 70   Resp: 24  19 13   Temp:  98.4  F (36.9  C) 98.6  F (37  C)    TempSrc:  Axillary Axillary    SpO2: 97%  97% 99%   Weight:       Height:         GEN: NAD, in wheelchair, weaning  HEENT: MMM, trach in place  CVS: regular rhythm, no murmurs  RESP: Rhoncherous in anterior lung  fields  ABD: Soft, No abdominal pain with palpation   SKIN: Warm extremities, no visible rashes  EXT: warm, no edema  NEURO:  Grossly intact    Pertinent Labs:   Lab Results: personally reviewed.     Pertinent Radiology:  6/14/21 CXR:  The tracheostomy projects within the tracheal air column. Trachea is midline and central airways are patent.     The lungs are symmetrically inflated. Focal opacity in the medial left base is typical location for subsegmental atelectasis. No interstitial thickening or airspace opacities elsewhere in the lungs.     No pleural fluid or pneumothorax.

## 2021-06-26 NOTE — CONSULTS
"[facility]      Assessment /Plan:    Lethargy    Recent history significant for subarachnoid hemorrhage, BAYLEE strokes, temporary extraventricular drain.  Last CT scan was just prior to transfer here to [facility].  Given his lethargy in the last couple days we will repeat the CT scan now.    If that shows no new issues then his lethargy may be related to metabolic issues including possible pneumonia, electrolyte abnormalities.  I will review labs and add any that may be useful for ruling out specific metabolic issues as well.    Discussed with wife and daughter via the video tablet here in the room.      Subjective:    Dayron is a 70-year-old man seen at [facility] unit.  He has a complex neurologic history recently.  He initially presented on May 15 after he was found unresponsive by his wife.  Evaluation showed acute subarachnoid hemorrhage.  He had left extraventricular drain placed.  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  Subsequently he had elevated velocities on transcranial Dopplers and developed anterior cerebral artery infarcts, right worse than left.  He failed extubation and was reintubated, he had trach and PEG placed.  Extraventricular drain was removed.  He is now transferred here to [facility] for further management.  He has been weaning from the ventilator a bit, and presently is on CPAP.  On Saturday he was more responsive, he was following commands nicely with occupational therapy.  Today he remains more lethargic.      Objective:      /84 (Patient Position: Lying)   Pulse 94   Temp 99.3  F (37.4  C) (Axillary)   Resp 23   Ht 5' 11\" (1.803 m)   Wt 145 lb 11.2 oz (66.1 kg)   SpO2 98%   BMI 20.32 kg/m       Up in chair, mitts on both hands to prevent pulling at tubes  He is on CPAP via tracheostomy.  no acute distress, breathing comfortably  Sclera are anicteric, no conjunctival " injection  Oropharynx shows no lesions or exudate  Heart has regular rate and rhythm, no rub  Breath sounds are clear  Abdomen is soft and nontender, bowel sounds present  Skin shows no bruising or rash     He moves his head a little bit when I call his name  After I passively open his eyes he does keep them open for just a little bit.  He did not follow any commands for me but does move both arms quite well, and resists my moving them.    Gaze is conjugate, no nystagmus  Neck is supple, no bruits  Finger nose finger testing is normal  Rapid alternating movements are normal on both sides  Tone is a little increased throughout        Patient Active Problem List   Diagnosis     SAH (subarachnoid hemorrhage) (H)     Acute respiratory failure with hypoxia (H)     ESBL (extended spectrum beta-lactamase) producing bacteria infection       Current Facility-Administered Medications   Medication Dose Route Frequency Provider Last Rate Last Admin     acetaminophen tablet 650 mg (TYLENOL)  650 mg Oral Q4H PRN Sheri Beltre MD        Or     acetaminophen solution 650 mg (TYLENOL)  650 mg Enteral Tube Q4H PRN Sheri Beltre MD   650 mg at 06/14/21 1003     alteplase injection 1 mg (CATHFLO; TPA)  1 mg Intracatheter PRN Sheri Beltre MD         dextrose 5%  75 mL/hr Intravenous Continuous Sheri Beltre MD 75 mL/hr at 06/14/21 0817 75 mL/hr at 06/14/21 0817     dextrose 50 % (D50W) syringe 20-50 mL  20-50 mL Intravenous Q15 Min PRN Sheri Beltre MD         famotidine tablet 20 mg (PEPCID)  20 mg Enteral Tube BID Sheri Beltre MD   20 mg at 06/14/21 0931     glucagon (human recombinant) injection 1 mg  1 mg Subcutaneous Q15 Min PRN Sheri Beltre MD         heparin (PF) ANTICOAGULANT subcutaneous injection 5,000 Units  5,000 Units Subcutaneous Q8H FIXED TIMES Sheri Beltre MD   5,000 Units at 06/14/21 0614     hydrALAZINE injection 10 mg (APRESOLINE)  10 mg Intravenous Q6H PRN  Sheri Beltre MD   10 mg at 06/11/21 2019     HYDROmorphone injection 0.2-0.4 mg (DILAUDID)  0.2-0.4 mg Intravenous Q3H PRN Ruddy Talamantes MD         ipratropium-albuteroL 0.5-2.5 mg/3 mL nebulizer solution 3 mL (DUO-NEB)  3 mL Nebulization Q6H - RT Sheri Beltre MD   3 mL at 06/14/21 0753     lisinopriL tablet 10 mg (PRINIVIL,ZESTRIL)  10 mg Enteral Tube Daily Sheri Beltre MD   10 mg at 06/13/21 1818     meropenem 2 g in sodium chloride 0.9% 100 mL (MERREM)  2 g Intravenous Q8H Sheri Beltre MD   2 g at 06/14/21 0614     naloxone injection 0.2 mg (NARCAN)  0.2 mg Intravenous Q2 Min PRN Ruddy Talamantes MD        Or     naloxone injection 0.2 mg (NARCAN)  0.2 mg Intramuscular Q2 Min PRN Ruddy Talamantes MD        Or     naloxone injection 0.4 mg (NARCAN)  0.4 mg Intravenous Q2 Min PRN Ruddy Talamantes MD        Or     naloxone injection 0.4 mg (NARCAN)  0.4 mg Intramuscular Q2 Min PRN Ruddy Talamantes MD         nystatin 100,000 unit/mL suspension 500,000 Units (MYCOSTATIN)  5 mL Swish & Swallow QID Sheri Beltre MD   500,000 Units at 06/14/21 0931     sodium chloride 0.9%  50 mL/hr Intravenous PRN hSeri Beltre MD           No past medical history on file.    Social History     Socioeconomic History     Marital status:      Spouse name: Not on file     Number of children: Not on file     Years of education: Not on file     Highest education level: Not on file   Occupational History     Not on file   Social Needs     Financial resource strain: Not on file     Food insecurity     Worry: Not on file     Inability: Not on file     Transportation needs     Medical: Not on file     Non-medical: Not on file   Tobacco Use     Smoking status: Not on file   Substance and Sexual Activity     Alcohol use: Not on file     Drug use: Not on file     Sexual activity: Not on file   Lifestyle     Physical activity     Days per week: Not on  file     Minutes per session: Not on file     Stress: Not on file   Relationships     Social connections     Talks on phone: Not on file     Gets together: Not on file     Attends L[employer] day service: Not on file     Active member of club or organization: Not on file     Attends meetings of clubs or organizations: Not on file     Relationship status: Not on file     Intimate partner violence     Fear of current or ex partner: Not on file     Emotionally abused: Not on file     Physically abused: Not on file     Forced sexual activity: Not on file   Other Topics Concern     Not on file   Social History Narrative     Not on file       No family history on file.   Cancer Father   lung cancer   Diabetes Father   type 2 diabetes   Cancer Mother   colon cancer @ 85   Hypertension Mother       Hypertension Sister       Psychotic Disorder Sister   depression   Hypertension Sister       Hypertension Sister       Hypertension Sister           No Known Allergies    Review of Systems -unable to obtain reliably due to decreased responsiveness

## 2021-06-26 NOTE — PLAN OF CARE
Problem: Pain  Goal: Patient's pain/discomfort is manageable  Outcome: Progressing     Problem: Safety  Goal: Patient will be injury free during hospitalization  Outcome: Progressing     Problem: Daily Care  Goal: Daily care needs are met  Outcome: Progressing       Slept more than 6 hours the whole night, no signs of pain or discomfort, monitored for safety. No output noted on the rectal pouch, was removed, will continue to monitor. Bilateral mitts in place for safety, monitored.

## 2021-06-26 NOTE — PLAN OF CARE
Problem: Daily Care  Goal: Daily care needs are met  Outcome: Progressing     Problem: Psychosocial Needs  Goal: Demonstrates ability to cope with hospitalization/illness  Outcome: Progressing     Problem: Risk of Injury Due to Unsafe Behavior  Goal: Patient will remain safe while in restraint; physical/psychological needs will be met  Outcome: Progressing   Awake ,opens eyes but not tracking or following commands.  Full Vent support, copious secretions via trach, suctioned with green-pale yellow  in color' frequent suctioning, strong cough. Seen by Neurology for EEG -pending.  Frequent oral cares done. NO signs/sx of pain. Wife /daughter via video-updated.  Continue of bilateral mittens for safety. (still attempting to hold the trach tube when hanbds are free)  Petr Patton RN

## 2021-06-26 NOTE — PLAN OF CARE
Problem: Pain  Goal: Patient's pain/discomfort is manageable  Outcome: Progressing   Pt appears comfortable; Pt is nonverbal and does not respond to commands but also does not show objective signs of discomfort. Family suggests using three pillows  behind head when repositioning to prevent hyperflexion of the neck.  Problem: Potential for Compromised Skin Integrity  Goal: Skin integrity is maintained or improved  Outcome: Progressing   Skin intact; Pt repositioned Q2h.  Problem: Urinary Incontinence  Goal: Perineal skin integrity is maintained or improved  Outcome: Progressing   External catheter changed at 1500 once soiled; has been functional since replacement.  Problem: Safety  Goal: Patient will be injury free during hospitalization  Outcome: Not Progressing   Pt continues to reach for and tug at lines when arms are free.  Problem: Psychosocial Needs  Goal: Collaborate with patient/family/caregiver to identify patient specific goals for this hospitalization  Outcome: Not Progressing   Pt continues to respond to pain stimuli but no objective progress has been made in cognition.  Problem: Potential for transmission of organism by Contact, Enteric, Droplet and/or Airborne routes  Goal: Prevent transmission of organisms  Outcome: Not Progressing   Pt continues to pass loose stool; enteric precautions are appropriate.  Problem: Risk of Injury Due to Unsafe Behavior  Goal: Patient will remain safe while in restraint; physical/psychological needs will be met  Outcome: Not Progressing   Line pulling/tugging behavior remains present.  Problem: Decreased Mental Status Causing Increased Need for Safety  Goal: Provide a safe environment for patient (Implement appropriate elements in plan of care)  Outcome: Not Progressing   Pt unable to learn; has an altered mental state.

## 2021-06-26 NOTE — PROGRESS NOTES
Progress Note    Brief summary:   70 y.o. old male with past medical history of hypertension who presented to ED on 5/15/2021 for altered mental status. He had a fall and was found unresponsive by his wife. He was found to have acute subarachnoid hemorrhage.  On 5/15, he underwent left external ventricular drain placement.  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 One the same day, he also underwent craniotomy with clipping of PCOM aneurysm.  On 5/28/2021, patient had persistently elevated TCD, CT head showed bilateral BAYLEE infarct.  Next day, for persistent TCD and CTA showing bilateral BAYLEE vasospasm, was treated with milrinone and verapamil.  On 6/1/2021, angiogram showed no evidence of vasospasm.  He was extubated on 6/3/2021.On 6/6/2021, patient was reintubated due to drop in his saturation.  On 6/7/2021, patient underwent tracheostomy and PEG tube placement.  On 6/9/2021, EVD was removed.  Patient was initially started on Unasyn for possible pneumonia on 5/20/2021 which was switched to ceftriaxone the next day for sputum culture growing Klebsiella.  On 5/23, sputum culture also grew Pseudomonas for which ceftriaxone was switched to Zosyn.  Due to increased white blood cell counts, possibility of ventriculitis was raised however CSF was negative but antibiotic was switched to cefepime and vancomycin on 5/28/2021 with plan to continue for 2 weeks.  Vanco has been discontinued on 6/9/2021.  On 6/5/2021, sputum grew ESBL so cefepime was switched to meropenem.. On 6/14, pt developed some fatigue for which a CT head was done which showed slight increase in caliber of lateral and third ventricle  with possibility of developing communicating hydrocephalus. This was discussed with Dr. Casillas (neurosurgeon at [facility]), who did not think there was much change, however suggested repeating CT head today and call them back if any change.    Assessment/Plan  Principal Problem:    SAH (subarachnoid hemorrhage)  (H)  Active Problems:    Acute respiratory failure with hypoxia (H)    ESBL (extended spectrum beta-lactamase) producing bacteria infection    #Hypernatremia: due to dehydration. Resolved now.     #Leucocytosis: No fever, likely from hemoconcentration with dehydration. Currently resolved.     #Acute hypoxic respiratory failure: Initially postoperatively but later complicated by pneumonia.  On mechanical ventilation, tracheostomy done on 6/7/2021.  Vent management per RT and pulmonology, appreciate input.  Currently on phase 1 weaning trial.     #Recent subarachnoid hemorrhage: Presented with fall and unresponsiveness on 5/15/2021.  S/p EVD, angiogram showing ruptured posterior communicating aneurysm followed by craniotomy with clipping of PCOM aneurysm on the same day.  EVD removed on 6/9/2021.  Postoperative period was complicated by vasospasm, bilateral BAYLEE infarct. MRI done on 5/28 showed right frontal parietal, temopral and left parasagittal parietal lobes infarcts.  On 6/1/2021, angiogram showed no vasospasm. Will need to follow up with Dr. Martinez in Neurosurgery clinic in 3 months with repeat CTA of head and neck for post-operative follow up  Remove sutures from his scalp today per his discharge instructions from outside facility.     #Acute metabolic encephalopathy: likely due to bleed, infarcts.   CT head showed slight increase in caliber of lateral and third ventricle  with possibility of developing communicating hydrocephalus. This was discussed with Dr. Casillas (neurosurgeon at [facility]), who did not think there was much change, however suggested repeating CT head today..   Neurology following pt here, appreciate input.   On restraints- mittens.     #Hospital acquired pneumonia: Sputum cultures from prior hospitalization showed Klebsiella, Pseudomonas.  Patient has been on antibiotics since 5/20/2021.  On 6/5/2021, was sputum grew ESBL for which patient is on meropenem, until 6/19/2021.     #Oropharyngeal  dysphagia: On tube feeding via PEG tube, dietitian, speech following.     #Hypertension: Elevated. On lisinopril, dose increased to 40mg today     #Diarrhea: C diff checked at [place] was negative. Likely abx associated diarrhea, resolved, rectal tube out    #Moderate malnutrition- RD following    #Hypokalemia - likely secondary to GI loss - resolved.  -On K protocol, RN to manage    DVT ppx:Heparin  GI ppx:Famotidine    Diet: Tube feeding    Code: Full code    Subjective  Overnight events reviewed.   Patient seen and examined.  Prior to my encounter, watched him working with speech therapist, following commands.Pt sleeping, opens eyes briefly during my visit. Talked to wife and daughter over the ipad in the room.   Per wife, pt more awake, alert in later part of the day      Objective    Vital signs in last 24 hours  Temp:  [98  F (36.7  C)-98.6  F (37  C)] 98.5  F (36.9  C)  Heart Rate:  [70-78] 75  Resp:  [13-25] 24  BP: (145-155)/(81-98) 155/98  FiO2 (%):  [30 %] 30 %  Weight:   138 lb 4.8 oz (62.7 kg)    Intake/Output last 3 shifts  I/O last 3 completed shifts:  In: 5271 [I.V.:1074; NG/GT:3889; IV Piggyback:308]  Out: 1900 [Urine:1900]  Intake/Output this shift:  No intake/output data recorded.    Physical Exam   General: Sleepy  Eyes: PERRL+, no pallor/scleral icterus  Chest: Clear to auscultation bilaterally  Heart: RRR  Abdomen: soft, non tender  Neuro: unable to assess      Meds    famotidine  20 mg Enteral Tube BID     heparin (PF) ANTICOAGULANT  5,000 Units Subcutaneous Q8H FIXED TIMES     ipratropium-albuteroL  3 mL Nebulization Q6H - RT     Lactobacillus rhamnosus GG  1 capsule Enteral Tube Daily with brkfst     lisinopriL  20 mg Enteral Tube Daily     meropenem (MERREM) IVPB in 100mL  2 g Intravenous Q8H     nystatin  5 mL Swish & Swallow QID       Pertinent Labs   Lab Results: personally reviewed.     Results from last 7 days   Lab Units 06/15/21  1705 06/15/21  0600 06/14/21  1450 06/14/21  0734  06/12/21  0522   LN-SODIUM mmol/L 145 147* 157* 158* 145   LN-POTASSIUM mmol/L 4.2 3.4*  --  3.9 3.9   LN-CHLORIDE mmol/L  --  108*  --  116* 105   LN-CO2 mmol/L  --  30  --  30 31   LN-BLOOD UREA NITROGEN mg/dL  --  35*  --  41* 27*   LN-CREATININE mg/dL  --  0.69*  --  0.77 0.70   LN-CALCIUM mg/dL  --  9.6  --  9.9 9.4         Results from last 7 days   Lab Units 06/16/21  0639 06/15/21  0600 06/14/21  0734   LN-WHITE BLOOD CELL COUNT thou/uL 6.7 8.1 12.5*   LN-HEMOGLOBIN g/dL 8.2* 8.6* 9.4*   LN-HEMATOCRIT % 26.5* 28.4* 30.7*   LN-PLATELET COUNT thou/uL 250 288 372         Pertinent Radiology   Radiology Results: Personally reviewed impressions    Xr Chest 1 View Portable    Result Date: 6/14/2021  EXAM: XR CHEST 1 VIEW PORTABLE LOCATION: [facility] DATE/TIME: 6/14/2021 1:43 PM INDICATION: resp failure, pneumonia COMPARISON: Portable AP view of the chest 6/9/2021     The tracheostomy projects within the tracheal air column. Trachea is midline and central airways are patent. The lungs are symmetrically inflated. Focal opacity in the medial left base is typical location for subsegmental atelectasis. No interstitial thickening or airspace opacities elsewhere in the lungs. No pleural fluid or pneumothorax.     Ct Head Without Contrast    Result Date: 6/14/2021  EXAM: CT HEAD WO CONTRAST LOCATION: [facility] DATE/TIME: 6/14/2021 1:44 PM INDICATION: Follow-up intracranial hemorrhage. Lethargy. COMPARISON: CT head 06/11/2021 TECHNIQUE: Routine CT Head without IV contrast. Multiplanar reformats. Dose reduction techniques were used. FINDINGS: INTRACRANIAL CONTENTS: Right frontoparietal and pterional craniotomy changes with right paraclinoid aneurysm clip as seen previously. Slight interval decrease in residual mixed attenuation extra-axial fluid and blood products deep to the craniotomy flap which currently measures up to C6-C7 millimeters in maximum radial thickness  compared to 8 mm previously along the anterior-inferior right frontal lobe. Small volume subarachnoid hemorrhage remains within the posterior most aspect of the left sylvian fissure and adjacent parietal sulci, decreased since the previous exam. Small volume residual intraventricular hemorrhage layering within the temporal horns bilaterally. No definite new hemorrhage or enlarging extra-axial collection. Subacute infarct of the anterior parasagittal right frontal lobe. Small amount of parenchymal blood products along an old left frontal catheter tract, decreased since the prior exam. Mild lateral and third ventriculomegaly, slightly increased since the prior exam. For example, transverse diameter of the frontal horns measures up to 4 cm compared to 3.8 cm previously. VISUALIZED ORBITS/SINUSES/MASTOIDS: No intraorbital abnormality. Layering partially aerated secretions within the bilateral sphenoid sinuses, similar to the prior exam. Minor mucosal thickening inferior right maxillary sinus. No middle ear or mastoid effusion. BONES/SOFT TISSUES: No new calvarial defect.     1.  Slight interval increase in the caliber of the lateral and third ventricles without evidence for outflow obstruction. Developing communicating hydrocephalus is possible. Follow-up is advised. 2.  Multi compartmental intracranial hemorrhage with decreasing conspicuity of hyperattenuating blood products compared to 06/11/2021. No new hemorrhage or enlarging extra-axial collection identified. 3.  Subacute infarct of the parasagittal right frontal lobe.    Ct External Imaging Head    Result Date: 6/11/2021  Images were obtained from an external facility.  Click [employer] Images hyperlink to view images.  Textual results have been scanned into the media tab.    Ct External Imaging Head    Result Date: 6/11/2021  Images were obtained from an external facility.  Click [employer] [facility] [facility] hyperlink to view images.  Textual results have been scanned into the media  tab.    Xr External Imaging    Result Date: 6/11/2021  Images were obtained from an external facility.  Click PACS Images hyperlink to view images.  Textual results have been scanned into the media tab.    Xr External Imaging Chest    Result Date: 6/11/2021  Images were obtained from an external facility.  Click PACS Images hyperlink to view images.  Textual results have been scanned into the media tab.          Advanced Care Planning:  Discharge Planning discussed with patient's wife, Susy and daughter Yolanda over the ipad in the room.  Anticipated LOS: TBD  Barrier to discharge: Acute issues  Disposition:TBD  Discussed care with patient's family for >35 minutes with greater than 50% of time spent in counseling and coordination of care.      Kim Knox MD  Hospitalist    This note was dictated using voice recognition software. Any grammatical or context distortions are unintentional and inherent to the software.

## 2021-06-26 NOTE — PROGRESS NOTES
PROVIDER RESTRAINT FOR NON-VIOLENT BEHAVIOR FACE TO FACE EVALUATION    Patient's Immediate Situation:  Patient demonstrated the following behaviors: Pulling/tugging at invasive lines or tubes and does not respond to verbal/non-verbal redirection    Patient's Reaction to the intervention:  Does patient understand the reason for restraint/seclusion? Unable to express    Medical Condition:  Is there any evidence of compromise of Skin integrity, Respiratory, Cardiovascular, Musculoskeletal, Hydration? No    Behavioral Condition:  In consultation with the RN, is there a need to continue this restraint or seclusion? Yes    See Restraint Flowsheet for complete restraint documentation and assessment.    Star Diaz MD

## 2021-06-26 NOTE — CONSULTS
Pulmonary Consult Note  Date of Service: 6/14/2021    Reason for Consultation: respiratory failure    History:     HPI: Patient is a 70 y.o. old male with past medical history of HTN who had initially p/w AMS on 5/15/2021 and found to have SAH. He underwent left external ventricular drain placement.  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  On 5/28/2021, patient had persistently elevated TCD, CT head showed bilateral BAYLEE infarct.  CTA revealed  bilateral BAYLEE vasospasm and was treated with milrinone and verapamil.  On 6/1/2021, angiogram showed no evidence of vasospasm.  He was extubated on 6/3/2021.  On 6/6/2021, patient was reintubated due to drop in his saturation.  On 6/7/2021, patient underwent tracheostomy and PEG tube placement.  On 6/9/2021, EVD was removed.  Sputum cultures grew Klebsiella which was treated initially with Unasyn and ceftriaxone. He then grew pseudomonas and was switched from ceftriaxone to zoysn.  His antibiotic regimen was then changed to cefepime and vancomycin on 5/28/2021 due to rising WBC and concern for ventriculitis.  However CSF fluid was negative.  His vancomycin was discontinued on 6/9/2021.  Sputum culture then grew ESBL so his cefepime was later switched to meropenem.    PMHx/PSHx:  HTN  PALMA S/p craniotomy    No past surgical history on file.  Social History     Socioeconomic History     Marital status:      Spouse name: Not on file     Number of children: Not on file     Years of education: Not on file     Highest education level: Not on file   Occupational History     Not on file   Social Needs     Financial resource strain: Not on file     Food insecurity     Worry: Not on file     Inability: Not on file     Transportation needs     Medical: Not on file     Non-medical: Not on file   Tobacco Use     Smoking status: Not on file   Substance and Sexual Activity     Alcohol use: Not on file  "    Drug use: Not on file     Sexual activity: Not on file   Lifestyle     Physical activity     Days per week: Not on file     Minutes per session: Not on file     Stress: Not on file   Relationships     Social connections     Talks on phone: Not on file     Gets together: Not on file     Attends Denominational service: Not on file     Active member of club or organization: Not on file     Attends meetings of clubs or organizations: Not on file     Relationship status: Not on file     Intimate partner violence     Fear of current or ex partner: Not on file     Emotionally abused: Not on file     Physically abused: Not on file     Forced sexual activity: Not on file   Other Topics Concern     Not on file   Social History Narrative     Not on file       Review of Systems - 10 point review of system negative except for what is mentioned in the HPI.    Exam/Data:   /84 (Patient Position: Lying)   Pulse 94   Temp 99.3  F (37.4  C) (Axillary)   Resp 23   Ht 5' 11\" (1.803 m)   Wt 145 lb 11.2 oz (66.1 kg)   SpO2 98%   BMI 20.32 kg/m      EXAM:  GEN: comfortable, NAD  HEENT: + trach with thick yellow secretions around trach site  CVS: S1S2, RRR  Lung: good air entry, bilateral crackles  Abd: soft, nt  Ext: no c/c/e  Neuro: awake, follows simple commands    DATA    Labs: reviewed in EMR and outside records where pertinent.     CT head 6/11/21  1. Left frontal approach ventriculostomy catheter is removed. Small  amount of intraparenchymal hemorrhage and surrounding vasogenic edema  along the prior track, previously partially obscured by catheter,  likely not significantly changed. Stable ventricle size.  2. Stable scattered subarachnoid hemorrhages and right frontal  subdural collection  3. Unchanged multifocal evolving infarctions.     CT head 6/9/21  1. Stable left frontal approach ventriculostomy catheter with stable  ventricular size.  2. Stable scattered subarachnoid hemorrhages and right frontal  subdural " collection.  3. Unchanged multifocal evolving infarctions.     MRI brain 5/28/21  1. Multifocal infarcts involving the right frontal, parietal,  temporal, and left parasagittal parietal lobes now subacute in age  with associated edema, sulcal effacement, mass effect with grossly  unchanged 11 mm of right-to-left midline shift. Basal cisterns are  patent.  2. Extensive subarachnoid hemorrhages involving the bilateral cerebral  convexities including the sylvian fissures, not significant changed.  3. Multifocal microhemorrhage/hemosiderin deposition in the bilateral  basal ganglia, insula, left frontal lobe, and right cerebellar  hemisphere. Hemosiderosis.  4. Stable ventriculomegaly. Left anterior approach ventriculostomy  catheter in stable position.  5. Small right cerebral and cerebellar subdural hematoma.    Assessment/Plan:   Dayron Neri is a 70 y.o. male with hypertension who was found to have subarachnoid hemorrhage due to ruptured posterior communicating artery status post craniotomy with clipping of the PCOM aneurysm, bilateral BAYLEE infarct, bilateral BAYLEE vasospasm, and respiratory failure status post tracheostomy and PEG tube placement on 6/7/2021.  Patient's course was complicated by pneumonia which grew Klebsiella, Pseudomonas, and ESBL.    Patient had Shiley #6 placed on 6/7/2021.  Tolerated 11 hours and 21 minutes of CPAP/pressure support yesterday.    Recommendations:  Continue cefepime, until 6/19/2021  Phase 1  Scheduled and prn nebs  PT/OT and speech  DVT PPx  F/u CXR    TTS greater than 60 min, more than 50% on counseling and coordination of care    I appreciate the opportunity to participate in the care of Dayron Neri.  Please feel free to contact me at any time.    Evon Boone MD  Pulmonary and Critical Care Medicine  6/14/2021      No family history on file.  No Known Allergies    Medications:     Current Facility-Administered Medications   Medication Dose Route Frequency Provider Last  Rate Last Admin     acetaminophen tablet 650 mg (TYLENOL)  650 mg Oral Q4H PRN Sheri Beltre MD        Or     acetaminophen solution 650 mg (TYLENOL)  650 mg Enteral Tube Q4H PRN Sheri Beltre MD   650 mg at 06/14/21 1003     alteplase injection 1 mg (CATHFLO; TPA)  1 mg Intracatheter PRN Sheri Beltre MD         dextrose 5%  75 mL/hr Intravenous Continuous Sheri Beltre MD 75 mL/hr at 06/14/21 0817 75 mL/hr at 06/14/21 0817     dextrose 50 % (D50W) syringe 20-50 mL  20-50 mL Intravenous Q15 Min PRN Sheri Beltre MD         famotidine tablet 20 mg (PEPCID)  20 mg Enteral Tube BID Sheri Beltre MD   20 mg at 06/14/21 0931     glucagon (human recombinant) injection 1 mg  1 mg Subcutaneous Q15 Min PRN Sheri Beltre MD         heparin (PF) ANTICOAGULANT subcutaneous injection 5,000 Units  5,000 Units Subcutaneous Q8H FIXED TIMES Sheri Beltre MD   5,000 Units at 06/14/21 0614     hydrALAZINE injection 10 mg (APRESOLINE)  10 mg Intravenous Q6H PRN Sheri Beltre MD   10 mg at 06/11/21 2019     HYDROmorphone injection 0.2-0.4 mg (DILAUDID)  0.2-0.4 mg Intravenous Q3H PRN Ruddy Talamantes MD         ipratropium-albuteroL 0.5-2.5 mg/3 mL nebulizer solution 3 mL (DUO-NEB)  3 mL Nebulization Q6H - RT Sheri Beltre MD   3 mL at 06/14/21 0753     lisinopriL tablet 10 mg (PRINIVIL,ZESTRIL)  10 mg Enteral Tube Daily Sheri Beltre MD   10 mg at 06/13/21 1818     meropenem 2 g in sodium chloride 0.9% 100 mL (MERREM)  2 g Intravenous Q8H Sheri Beltre MD   2 g at 06/14/21 0614     naloxone injection 0.2 mg (NARCAN)  0.2 mg Intravenous Q2 Min PRN Ruddy Talamantes MD        Or     naloxone injection 0.2 mg (NARCAN)  0.2 mg Intramuscular Q2 Min PRN Ruddy Talamantes MD        Or     naloxone injection 0.4 mg (NARCAN)  0.4 mg Intravenous Q2 Min PRN Ruddy Talamantes MD        Or     naloxone injection 0.4 mg (NARCAN)  0.4 mg  Intramuscular Q2 Min PRN Rdudy Talamantes MD         nystatin 100,000 unit/mL suspension 500,000 Units (MYCOSTATIN)  5 mL Swish & Swallow QID Sheri Beltre MD   500,000 Units at 06/14/21 0931     sodium chloride 0.9%  50 mL/hr Intravenous PRN Sheri Beltre MD           Much or all of the text in this note was generated through the use of the Dragon Dictate voice-to-text software. Errors in spelling or words which seem out of context are unintentional. Sound alike errors, in particular, may have escaped editing.

## 2021-06-26 NOTE — PROGRESS NOTES
WOC RN Assessment Note     Today's Visit: No new skin concerns reported to writer, routine reassessment of wound, ostomy and skin concerns.     Labs:  Albumin   Date/Time Value Ref Range Status   06/14/2021 02:50 PM 2.1 (L) 3.5 - 5.0 g/dL Final     Recent Labs     06/23/21  0543   HGB 9.2*       Vitals:   The last obtained vitals are:   Temp: 98.9  F (37.2  C)  Heart Rate: 81  Resp: 20  BP: 96/52    Coy:  Coy Scale Score: 13  Specialty Bed: Isogel (Iron Belt)  140 lb 11.2 oz (63.8 kg)  Body mass index is 19.62 kg/m .      Wound Ostomy Continence Assessment/Plan:  Wound Scalp incisions status post  EVD 5/15-6/9  Left top scalp and right top   Sutures out, incisions healed  No areas of dehiscence; No signs and symptoms of infection  Treatment Plan: Leave open to air. HUC/nurse to review transferring paperwork if it does not have the suture removal orders then contact Primary MD or Neurosurgery for removal date order.       Ostomy  Attention to Tracheostomy   Shiley faceplate, no concerns  Treatment Plan: Routine cares     Attention to Feeding Tube  No concerns  Treatment Plan: Routine cares      Last Prealbumin: Not available     Coy score: 13          Bed: Accumax          Pillows for heel elevation     Discussed plan of care with nurse and patient.     Outcomes and treatment recommendations are to promote skin integrity, contain exudate and promote wound healing.    WOC RN will sign off, wound/ostomy treatment plan in place. Please re-consult with any new concerns.    SAUD GarciaN, RN, PHN, HNB-BC, CWOCN

## 2021-06-26 NOTE — PLAN OF CARE
Problem: Safety  Goal: Patient will be injury free during hospitalization  Outcome: Progressing     Problem: Daily Care  Goal: Daily care needs are met  Outcome: Progressing     Problem: Potential for Falls  Goal: Patient will remain free of falls  Outcome: Progressing     Pt slept most of shift. No nonverbal signs of pain.  Pt sustained a minor, superficial tissue injury on left upper arm - suction tubing adhered to skin. Pt's wife was informed.  All care needs met.

## 2021-06-26 NOTE — PLAN OF CARE
Problem: Daily Care  Goal: Daily care needs are met  Outcome: Progressing   Patient was calm, cooperative with all cares including shaving which was done by the Nursing Assistant. Soft Limb restraint released and had skin checked every 2 hours. Condom catheter was used this shift and patient remain dry all shift. No sign of pain noted. Scrotum remained swollen , more to the left side. Charge nurse confirmed increase of swelling and requested it to be checked.

## 2021-06-26 NOTE — PLAN OF CARE
Problem: Pain  Goal: Patient's pain/discomfort is manageable  Outcome: Progressing    PRN tylenol with relief this am, Pain to neck area where trach is.    Problem: Psychosocial Needs  Goal: Collaborate with patient/family/caregiver to identify patient specific goals for this hospitalization  Outcome: Progressing    Discussed patient needs and plan with wife and daughter, MD at bedside as well.      Problem: Daily Care  Goal: Daily care needs are met  Outcome: Progressing    Plan to get up in chair this afternoon

## 2021-06-26 NOTE — PLAN OF CARE
Problem: Pain  Goal: Patient's pain/discomfort is manageable  Outcome: Progressing     Problem: Safety  Goal: Patient will be injury free during hospitalization  Outcome: Progressing     Problem: Daily Care  Goal: Daily care needs are met  Outcome: Progressing     Problem: Knowledge Deficit  Goal: Patient/family/caregiver demonstrates understanding of disease process, treatment plan, medications, and discharge instructions  Outcome: Progressing     Problem: Urinary Incontinence  Goal: Perineal skin integrity is maintained or improved  Outcome: Progressing   Patient awake with cares; no s/s of pain noted.  Patient was suctioned by RT x 2 this shift; yellow secretions noted around trach site, thick.  Patient continues with soft limb restraints; hands active reaching for trach site when restraints released for cares. Scrotum is red, excoriated; calmoseptine ointment ordered per wound standing order. Patient sleeping at this time; appears comfortable in bed.

## 2021-06-26 NOTE — PROGRESS NOTES
Speech Language/Pathology  Speech Therapy Daily Progress Note    Patient presents as lethargic and cooperative during this session.  An  was not applicable.  Oral cares provided.    Objective  Voicing  Trach: #6 Shicricket  Baseline vent parameters:  Respiratory Parameters    Mode PS 8   Vent PEEP 5   Vent Breaths Per Minute na   Vent FiO2 24   Vent Vt na   Baseline physiologic parameters:  SATS: 93  HR: 89  RR: 29  Vt: 402    Upon arrival, SLP educated patient re: current plan and procedure. Patient was in agreement. SLP deflated cuff and tracheal suctioning was completed. Evidence of airflow into the upper airway was exhibited as evidenced by audible voicing and greater than 40% decrease in return Vt. Upon cuff deflation, patient did require oral yaunker suctioning secondary to clearing of oropharyngeal secretions via coughing.     Initial coughing noted. Vocal quality was mildly hoarse/strained. Respiratory status was notable for decrease in respiratory rate to low 20s. RT did increase FiO2 to 30% for trial. Patient answered simple questions in context with 70% response rate, increased to 100% with repetition and prompts. Answers were vague and suspect significant confusion. Toward end of trial patient began to develop mildly wet vocal quality. Patient was cued to cough, clear throat to improve vocal quality, intelligibility and airway clearance. Needed moderate cues to do so.      Speaking valve was trialed for 10 minutes. Speaking valve was removed and the cuff was reinflated by Speech Pathologist. Prior to dismissal, respiratory rate was 24, and SATS were 97. Handoff with Respiratory Therapist Sudha completed in room     Assessment  Demonstrates good tolerance of speaking valve, does still present with some degree of risk for aspiration of secretions, thorough and consistent oral cares would be appropriate and routine cuff deflation to clear secretions superior to trach cuff.      Plan/Recommendations  Continue speaking valve trials, speech only    The ST Care Plan has been reviewed and current plan remains appropriate.    8 speech/language minutes and 15 voice minutes     David Coffman MA, CCC-SLP

## 2021-06-26 NOTE — PLAN OF CARE
Problem: Mechanical Ventilation  Goal: Patient will maintain patent airway  Outcome: Progressing  Goal: Tracheostomy will be managed safely  Outcome: Progressing  Goal: Respiratory status - ventilation  Description: Movement of air in and out of the lungs.    Liberate from ventilator  Outcome: Progressing   RT PROGRESS NOTE   [id number]  DATA:     CURRENT SETTINGS:             TRACH TYPE / SIZE:  6 Shiley DCT, placed on 6/7             MODE:   PRVC 14 450 +5 35%             FIO2:        ACTION:             THERAPIES:   Duoneb q6             SUCTION:                           FREQUENCY:   X4 for small to lg white/p-yellow thickish                        AMOUNT:                           CONSISTENCY:                           COLOR:                SPONTANEOUS COUGH EFFORT/STRENGTH OF EFFORT (not elicited by suctioning):                               WEANING PHASE: 1                          WEAN MODE:   PS 10/5 for 4.5hr, interrupted for CT, resumed after for 1hr 40min, stopped for Apnea with Vt 99cc.                   WEAN TIME:                           END WEAN REASON:        RESPONSE:             BS:                VITAL SIGNS:                EMOTIONAL NEEDS / CONCERNS:                  RISK FOR SELF DECANNULATION:                          RISK DUE TO:                          INTERVENTION/S IN PLACE IS/ARE:         NOTE / PLAN:     Cont with Phase 1  Cont to monitor closely.

## 2021-06-26 NOTE — PLAN OF CARE
Problem: Pain  Goal: Patient's pain/discomfort is manageable  Outcome: Progressing     Problem: Safety  Goal: Patient will be injury free during hospitalization  Outcome: Progressing     Problem: Daily Care  Goal: Daily care needs are met  Outcome: Progressing     Problem: Risk of Injury Due to Unsafe Behavior  Goal: Patient will remain safe while in restraint; physical/psychological needs will be met  Outcome: Progressing  Goal: Alternatives to restraint will be continually assessed with use of least restrictive device and discontinuation as soon as possible  Outcome: Progressing  Goal: Patient/Family will be able to communicate reason for restraint and steps for restraint application and removal  Outcome: Progressing     Problem: Knowledge Deficit  Goal: Patient/family/caregiver demonstrates understanding of disease process, treatment plan, medications, and discharge instructions  Outcome: Progressing     Problem: Potential for Falls  Goal: Patient will remain free of falls  Outcome: Progressing   Patient awake during cares; was sleeping well in between cares.  No s/s of pain noted; continues on soft limb restraints; right hand active.  Blood sugar had been WDL.  Patient opens mouth with cues during oral cares; appears comfortable in bed.

## 2021-06-26 NOTE — PLAN OF CARE
Problem: Daily Care  Goal: Daily care needs are met  Outcome: Progressing     Problem: Psychosocial Needs  Goal: Demonstrates ability to cope with hospitalization/illness  Outcome: Progressing     Problem: Risk of Injury Due to Unsafe Behavior  Goal: Patient will remain safe while in restraint; physical/psychological needs will be met  Outcome: Progressing   Awake, does not interact significantly, vent support, CPAP mode.Moderate/large/copious secretions-yellow in color. No s/s of pain. Male external catheter intact.  Left scalp suture still in place. Continue on soft limb restraints for safety.  Petr Patton, RN

## 2021-06-26 NOTE — PROGRESS NOTES
[facility] Pulmonary Medicine - Progress Note  6/22/2021    Assessment:  Principal Problem:    SAH (subarachnoid hemorrhage) (H)  Active Problems:    Acute respiratory failure with hypoxia (H)    ESBL (extended spectrum beta-lactamase) producing bacteria infection    Attention to tracheostomy (H)    Acute and chronic respiratory failure with hypoxia (H)    On mechanically assisted ventilation (H)    Encounter for palliative care    70 y.o. 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 Course c/b bilateral BAYLEE infarct with vasospasm. Extubated on 6/3/2021 and re-intubated on 6/6/2021 due to hypoxemia. On 6/7/2021, patient underwent tracheostomy and PEG tube placement. Treated for klebsiella and pseudomonal VAP, ESBL.    Recommendations/Plans:    Continue phase 2 weans    Continue duonebs    Completed cefepime course    Trach: 6 Shicricket, placed 6/7/21, no acute need to down-size the trach since he is tolerating PMV w/o issues    SLP following, appreciate recommendations    6/12 BDT - no immediate aspiration, doing supervised in-line PMV    Discussed my impressions and recommendations for care with RT.    Nia Bains MD  Pulmonary and Critical Care      -------------    Subjective  No acute events overnight. Required frequent suctioning overnight for large volumes of secretions -- this morning discovered that his trach cuff was only partially inflated. Once it was completely inflated, the endobronchial secretions became less of an issue.     Current FiO2 (%): 30 % Current PEEP: +5    Tracheal secretions: frequent, small to large    Current phase of ventilator weaning pathway:  Phase 2  Ventilator weaning results from yesterday: 12 hours & 23 minutes    Physical Exam:  Temp:  [98.1  F (36.7  C)-98.5  F (36.9  C)] 98.5  F (36.9  C)  Heart Rate:  [74-95]  95  Resp:  [19-32] 30  BP: (103-145)/(66-85) 110/71  FiO2 (%):  [24 %-32 %] 30 %    GEN: NAD, resting in bed  HEENT: MMM, trach in place  CVS: regular rhythm, no murmurs  RESP: Rhoncherous in anterior lung fields  ABD: Soft, No abdominal pain with palpation   SKIN: Warm extremities, no visible rashes  EXT: warm, no edema  NEURO:  Grossly intact    Pertinent Labs: Lab Results: personally reviewed.     Pertinent Radiology: personally reviewed, including Radiology interpretations (as below):  CXR, 6/18:  Tracheostomy tube in place. Left basilar opacities partially silhouetting the left hemidiaphragm are unchanged and again presumed to reflect some atelectasis. Right lung is clear. Heart and pulmonary vascularity are normal. Gastrostomy tube.

## 2021-06-26 NOTE — PLAN OF CARE
Problem: Occupational Therapy  Goal: OT Goals  Description: Patient will demonstrate the following by 7/16/21, in order to maximize independence with ADL/IADL performance:      -Demonstrate safety with Bed/Chair/Toilet transfer with mod A   -Complete UB dressing with min A,caryn tech as approp   -Participate in grooming/hygiene tasks with CGA in sitting  -Pt to demo bed mobility with max A  -Pt to tolerate sitting EOB w/max A 8 minutes   -Patient will participate in further cognitive testing in order to assist with safe discharge planning  -Patient will participate in further visual assessing  in order to assist with safe discharge planning   -Patient will participate in 15 minutes of UE Exercise/activity to increase activity tolerance for daily tasks/trf   -Pt to attend to therapy task 15 minute w/min A-vc for ease of ADL completion.     Goals reviewed and remain appropriate on 6/17/2021 by Dahiana Connelly OT        Outcome: Progressing

## 2021-06-26 NOTE — PROGRESS NOTES
Speech Language/Pathology  Speech Language Evaluation    History:  Diagnosis:   Patient Active Problem List   Diagnosis     SAH (subarachnoid hemorrhage) (H)     Acute respiratory failure with hypoxia (H)     ESBL (extended spectrum beta-lactamase) producing bacteria infection     Onset date: 5/15/2021  Reason for evaluation: Assess current cognitive-communication status.  Pertinent History: Dayron Neri is a 70 y.o. old male with past medical history of hypertension who presented to ED on 5/15/2021 for altered mental status. He had a fall and was found unresponsive by his wife. He was found to have acute subarachnoid hemorrhage.  On 5/15, he underwent left external ventricular drain placement.  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 One the same day, he also underwent craniotomy with clipping of PCOM aneurysm.  On 5/28/2021, patient had persistently elevated TCD, CT head showed bilateral BAYLEE infarct.  Next day, for persistent TCD and CTA showing bilateral BAYLEE vasospasm, was treated with milrinone and verapamil.  On 6/1/2021, angiogram showed no evidence of vasospasm.  He was extubated on 6/3/2021.On 6/6/2021, patient was reintubated due to drop in his saturation.  On 6/7/2021, patient underwent tracheostomy and PEG tube placement.  On 6/9/2021, EVD was removed.    Subjective:  Patient presents as awake and confused during this session.   An  was not applicable. Patient's spouse present via I pad.  Patient has bilateral hand mitts for safety.  Objective:  Oral motor function was difficult to assess due to poor comprehension/participation. Patient did not open mouth on command but later stuck out tongue to spouse's command. Note reduced lingual ROM.  Motor speech was not observed .  Patient did not mouth words despite cues.  Voice was aphonic due to trach.  Trach/Vent: Malcolm #6 6/7/2021. Patient weaning on PS today. RT notes indicate suctioning 5x/shift.    Auditory  "comprehension was moderate to severely impaired . Patient did not respond to simple yes/no questions via multiple modalities attempted (mouthing, head nod/shake, eye gaze to printed yes/no words positioned horizontally/left-right). Patient was able to follow 1 step  directions with 25 % accuracy given moderate cues.    Verbal expression was severely impaired. Patient did not attempt to mouth words. Patient was not able to participate in conversation given max cues.    Reading comprehension not formally assessed today. Patient wore glasses for reading task. Patient initially appeared to scan \"yes\" and \"no\" words presented horizontally but not able to use functionally to indicate yes/no responses by eye gaze.  Written expression not assessed today due to bilateral hand mitts and participation in prior tasks.    Cognition was moderate to severely impaired based on informal assessment.    Assessment:  Patient presents with deficits in, motor speech, auditory comprehension, verbal expression, reading comprehension, written expression, cognition, memory, voice and swallow.    Plan:  Speech therapy 3 times per week.  Referrals: bedside swallow eval (when appropriate)    15 speech/language minutes     Onelia Vuong MA, CCC-SLP    "

## 2021-06-26 NOTE — PLAN OF CARE
Problem: Risk of Injury Due to Unsafe Behavior  Goal: Patient will remain safe while in restraint; physical/psychological needs will be met  Outcome: Progressing   Patient in restraint for safety. Up in chair and comfortable, theapy session observed.     Problem: Potential for Compromised Skin Integrity  Goal: Skin integrity is maintained or improved  Outcome: Progressing   Scalp suture removed per order, site approximated and fragile.     Problem: Psychosocial Needs  Goal: Collaborate with patient/family/caregiver to identify patient specific goals for this hospitalization  Outcome: Progressing   Family at bedside monitor supportive, they're updated with plan of care with good understanding.

## 2021-06-26 NOTE — PLAN OF CARE
Problem: Risk of Injury Due to Unsafe Behavior  Goal: Patient will remain safe while in restraint; physical/psychological needs will be met  Outcome: Progressing    Continues with restraints soft limb x 2 for safety. Can occasionally reach for neck, etc and family feels more comfortable at this time to have mitts on    Problem: Knowledge Deficit  Goal: Patient/family/caregiver demonstrates understanding of disease process, treatment plan, medications, and discharge instructions  Outcome: Progressing    Gave family update this am and set up ipad.      Problem: Potential for Compromised Skin Integrity  Goal: Nutritional status is improving  Outcome: Progressing  Increase to water flushes and started D5 dues to elevated sodium  will recheck this afternoon.

## 2021-06-26 NOTE — PLAN OF CARE
Problem: Mechanical Ventilation  Goal: Patient will maintain patent airway  Outcome: Progressing  Goal: Tracheostomy will be managed safely  Outcome: Progressing  Goal: Respiratory status - ventilation  Description: Movement of air in and out of the lungs.    Liberate from ventilator  Outcome: Progressing     RT PROGRESS NOTE     DATA:     CURRENT SETTINGS: PRVC/AC 14/450/+5             TRACH TYPE / SIZE: #6 Shiley placed on 06/07/21             MODE:  PRVC/AC             FIO2:   24%     ACTION:             THERAPIES:   DUO NEB Q6             SUCTION:                           FREQUENCY: X4                        AMOUNT:   Large to small                        CONSISTENCY:  Thick                        COLOR:   Yellow/white             SPONTANEOUS COUGH EFFORT/STRENGTH OF EFFORT (not elicited by suctioning): Yes:Strong                              WEANING PHASE: #1                        WEAN MODE:    PS 8/5                        WEAN TIME:   12 hrs and 59 min                        END WEAN REASON:  to rest at night     RESPONSE:             BS:  Coarse             VITAL SIGNS:   SAT 96-97%, HR 78-81, RR 22-26             EMOTIONAL NEEDS / CONCERNS: N/A              RISK FOR SELF DECANNULATION:  Yes                        RISK DUE TO:  Confuse                        INTERVENTION/S IN PLACE IS/ARE: Bilateral mitts     NOTE / PLAN:   Pt is on full vent support, reji well. Cont weaning on ps 8-15 days as reji and keep sat >/=90%.

## 2021-06-26 NOTE — PROGRESS NOTES
Speech Language/Pathology  Blue Dye Test    Problem:  Patient Active Problem List   Diagnosis     SAH (subarachnoid hemorrhage) (H)     Acute respiratory failure with hypoxia (H)     ESBL (extended spectrum beta-lactamase) producing bacteria infection     Attention to tracheostomy (H)     Acute and chronic respiratory failure with hypoxia (H)     On mechanically assisted ventilation (H)     Encounter for palliative care     Encephalopathy     Metabolic encephalopathy     Idiopathic hypertension     Oropharyngeal dysphagia     Moderate protein-calorie malnutrition (H)     Pseudomonas aeruginosa infection     Acute tracheobronchitis       Onset date/date of surgery: 5/15/21  Pertinent history includes previous blue dye test on 6/12/21 with no immediate aspiration but detected delayed aspiration hours after presentation. Patient has been tolerating speaking valve trials generally but noted to have ongoing significant secretions per RT suspicious for aspiration.   Baseline Diet: regular with thin    Patient presents as awake and confused during this session.   An  was not applicable. Family present via IPad  Patient has #6 Shiley tracheostomy tube.    The reason for the current test was to evaluate swallow/secretion managemant.    The patient had minimal oral secretions and the patient had minimal  tracheal secretions.    The dye was given with the speaking valve in place.    A spontaneous swallow was not elicited.    Hyolaryngeal movement appeared reduced.    Upon initial tracheal suctioning with cuff down there was moderate blue dye observed.    Hand off to RT, Hawa, who completed tracheal suctioning, was completed in room.    Overall risk for aspiration of secretions is considered moderate to high. Suspect inadequate frequency of swallowing a significant cause for aspiration of secretions.     Recommend: speaking valve trials as tolerated. Given aspiration risk, would encourage frequent and thorough oral  cares. Repeat blue dye test in 1-2 weeks.    15 dysphagia minutes       David Coffman MA, CCC-SLP

## 2021-06-26 NOTE — PROGRESS NOTES
Speech Language/Pathology  Speech Therapy Daily Progress Note  Per chart, patient sleepy during the day and more alert at night.   Patient presents as lethargic, cooperative and confused during this session.  An  was not applicable. Family present on IPad.     Objective  Auditory comprehension: to address impairment of understanding spoken language    Patient completed simple yes/no questions with 30 % accuracy response rate.   Patient followed 1 step  directions with 30 % accuracy independently.    Verbal expression: to address impairment in communication of needs, wants, and ideas    Responsive naming was 20 % accurate independently in context of conversation.     Patient needed persistent prompts via verbal and tactile stimuli and cold compress to maintain alertness.     Trial of ice chips to determine readiness for more formal swallow evaluation. Patient was given x2 bites of single ice chips and demonstrated strong cough on 1 of the 2 presentations. Did manipulate ice in his mouth, but did not chew. Prolonged oral prep, leading to suspicion that aspiration may have occurred before swallow during that phase.      Assessment  Continues to present with decreased alertness, suspect that confusion between days/nights affecting ability to participate to some degree. Educated family re: prognosis, possible recovery curves, and plan.     Plan/Recommendations  Continue per plan    The ST Care Plan has been reviewed and current plan remains appropriate.    19 speech/language minutes     David Coffman MA, CCC-SLP

## 2021-06-26 NOTE — PLAN OF CARE
Problem: Safety  Goal: Patient will be injury free during hospitalization  Outcome: Progressing     Problem: Daily Care  Goal: Daily care needs are met  Outcome: Progressing     Problem: Potential for transmission of organism by Contact, Enteric, Droplet and/or Airborne routes  Goal: Prevent transmission of organisms  Outcome: Progressing     Problem: Mechanical Ventilation  Goal: Tracheostomy will be managed safely  Outcome: Progressing     Problem: Risk of Injury Due to Unsafe Behavior  Goal: Patient will remain safe while in restraint; physical/psychological needs will be met  Outcome: Progressing     Problem: Potential for Compromised Skin Integrity  Goal: Skin integrity is maintained or improved  Outcome: Progressing  Goal: Nutritional status is improving  Outcome: Progressing     Patient is alert but unable to assess his cognition because his trache is capped but he is non-verbal. He doesn't answer to simple yes-no questions either. He never used the call light although it was within his reach. His wife and daughter alternated being in constant communication with Staff/caregivers on the Ipad. He remained impulsive and pulls on tubings so he was kept on bilateral soft wrist restraints this evening. His trache was suctioned by this writer 2 X of thick, creamy secretions in moderate amounts. His cough was strong and productive, too. He is currently on continuous tube feeding and scheduled water flushes to meet his nutrition and hydration needs. Will keep monitoring patient's progress.

## 2021-06-26 NOTE — PLAN OF CARE
Problem: Pain  Goal: Patient's pain/discomfort is manageable  Outcome: Progressing     Problem: Daily Care  Goal: Daily care needs are met  Outcome: Progressing     Problem: Risk of Injury Due to Unsafe Behavior  Goal: Patient will remain safe while in restraint; physical/psychological needs will be met  Outcome: Progressing

## 2021-06-26 NOTE — PROGRESS NOTES
"[facility]    Assessment and Plan:    Lethargy      Recent history significant for subarachnoid hemorrhage, BAYLEE strokes, temporary extraventricular drain.  Last CT scan was just prior to transfer here to [facility].   CT 6/14 read as showing possible increased ventricular size, CT repeated 6/16, no change.    Sodium is fine now  UA was fine 6/14  Ammonia 29  hgb a bit low.      Still a bit lethargic.  Let's check EEG to rule out subclinical seizure.        Subjective:     Dayron is a 70-year-old man seen at [facility] unit. He has a complex neurologic history recently. He initially presented on May 15 after he was found unresponsive by his wife. Evaluation showed acute subarachnoid hemorrhage. He had left extraventricular drain placed. 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 Subsequently he had elevated velocities on transcranial Dopplers and developed anterior cerebral artery infarcts, right worse than left. He failed extubation and was reintubated, he had trach and PEG placed. Extraventricular drain was removed. He is now transferred here to [facility] for further management. He has been weaning from the ventilator a bit, and presently is on CPAP. On Saturday he was more responsive, he was following commands nicely with occupational therapy.    6-15-21: A bit more awake and responsive today which is reassuring.  CT yesterday showed no acute issues, concern raised for increased ventricular size    6-17-21: no significant change from previous note    6-18-21: still lethargic, work up unremarkable so far.     Objective:  /78 (Patient Position: Lying)   Pulse 76   Temp 98.3  F (36.8  C) (Axillary)   Resp 15   Ht 5' 11\" (1.803 m)   Wt 138 lb 4.8 oz (62.7 kg)   SpO2 100%   BMI 19.29 kg/m       Opens eyes just briefly for me  Did not follow any commands for me, but does resist and does withdraw to pain. "   Gaze is conjugate, no nystagmus, no gaze deviation  Neck is supple, no bruits  Tone is a little increased throughout      Scheduled Meds:    amino acids-protein hydrolys  1 packet Enteral Tube DAILY     famotidine  20 mg Enteral Tube BID     heparin (PF) ANTICOAGULANT  5,000 Units Subcutaneous Q8H FIXED TIMES     insulin aspart (NovoLOG) injection   Subcutaneous Q6H FIXED TIMES     ipratropium-albuteroL  3 mL Nebulization Q6H - RT     Lactobacillus rhamnosus GG  1 capsule Enteral Tube Daily with brkfst     lisinopriL  40 mg Enteral Tube Daily     meropenem (MERREM) IVPB in 100mL  2 g Intravenous Q8H     nystatin  5 mL Swish & Swallow QID     sodium chloride  3 mL Intravenous Line Care     Continuous Infusions:    PRN Meds:.acetaminophen **OR** acetaminophen, alteplase, dextrose 50 % (D50W), glucagon (human recombinant), hydrALAZINE, HYDROmorphone, naloxone **OR** naloxone **OR** naloxone **OR** naloxone, sodium chloride 0.9%, sodium chloride     Jimmie Gaona MD

## 2021-06-26 NOTE — PROGRESS NOTES
Progress Note    Brief summary:   70 y.o. old male with past medical history of hypertension who presented to ED on 5/15/2021 for altered mental status. He had a fall and was found unresponsive by his wife. He was found to have acute subarachnoid hemorrhage.  On 5/15, he underwent left external ventricular drain placement.  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 One the same day, he also underwent craniotomy with clipping of PCOM aneurysm.  On 5/28/2021, patient had persistently elevated TCD, CT head showed bilateral BAYLEE infarct.  Next day, for persistent TCD and CTA showing bilateral BAYLEE vasospasm, was treated with milrinone and verapamil.  On 6/1/2021, angiogram showed no evidence of vasospasm.  He was extubated on 6/3/2021.On 6/6/2021, patient was reintubated due to drop in his saturation.  On 6/7/2021, patient underwent tracheostomy and PEG tube placement.  On 6/9/2021, EVD was removed.  Patient was initially started on Unasyn for possible pneumonia on 5/20/2021 which was switched to ceftriaxone the next day for sputum culture growing Klebsiella.  On 5/23, sputum culture also grew Pseudomonas for which ceftriaxone was switched to Zosyn.  Due to increased white blood cell counts, possibility of ventriculitis was raised however CSF was negative but antibiotic was switched to cefepime and vancomycin on 5/28/2021 with plan to continue for 2 weeks.  Vanco has been discontinued on 6/9/2021.  On 6/5/2021, sputum grew ESBL so cefepime was switched to meropenem which he is on currently.   He was transferred to [facility] on 6/11/21.    6/14/21 CT head done for fatigue- read as slight increase in caliber of lateral and third ventricle  With possibility of developing communicating hydrocephalus. Discussed with Dr. Casillas (neurosurgeon at [facility]), who did not think there was much change, however suggested repeating CT head on 6/16/21 which did not show any change.    Assessment/Plan  Principal Problem:     SAH (subarachnoid hemorrhage) (H)  Active Problems:    Acute respiratory failure with hypoxia (H)    ESBL (extended spectrum beta-lactamase) producing bacteria infection    Attention to tracheostomy (H)    Acute and chronic respiratory failure with hypoxia (H)    On mechanically assisted ventilation (H)          #Acute hypoxic respiratory failure: Initially postoperatively but later complicated by pneumonia.  On mechanical ventilation, tracheostomy done on 6/7/2021.  Vent management per RT and pulmonology. Currently on phase 2 weaning.  - increased secretion from trach- no fever, leucocytosis, normal procalcitonin. Sputum was sent yesterday.      #Recent subarachnoid hemorrhage: Presented with fall and unresponsiveness on 5/15/2021.  S/p EVD, angiogram showing ruptured posterior communicating aneurysm followed by craniotomy with clipping of PCOM aneurysm on the same day.  EVD removed on 6/9/2021.  Postoperative period was complicated by vasospasm, bilateral BAYLEE infarct. MRI done on 5/28 showed right frontal parietal, temopral and left parasagittal parietal lobes infarcts.  On 6/1/2021, angiogram showed no vasospasm. Will need to follow up with Dr. Martinez in Neurosurgery clinic in 3 months with repeat CTA of head and neck for post-operative follow up     #Acute metabolic encephalopathy: likely due to bleed, infarcts. On restraints- mittens.CT head showed slight increase in caliber of lateral and third ventricle  with possibility of developing communicating hydrocephalus. This was discussed with Dr. Casillas (neurosurgeon at [facility]), who did not think there was much change, however suggested repeating CT head which was done on 6/16 with no change from prior..   Neurology following pt here, appreciate input.      #Hospital acquired pneumonia: Sputum cultures from prior hospitalization showed Klebsiella, Pseudomonas.  Patient has been on antibiotics since 5/20/2021.  On 6/5/2021, was sputum grew ESBL for which patient is on  meropenem, until 6/19/2021.     #Oropharyngeal dysphagia: On tube feeding via PEG tube, dietitian, speech consult. Video swallow 6/14     #Hypertension: On lisinopril     #Diarrhea:  resolved, rectal tube out    #Moderate malnutrition    #Hypokalemia - likely secondary to GI loss - resolved.  -On K protocol, RN to manage    #Hypernatremia: due to dehydration. Resolved. D5 discontinued. Continue flushes     Subjective  Patient seen and examined  He was on bed, awake, looking at the ipad screen where her wife was on. At one point he lifted his right hand to wave at his wife.  Follows few commands    Objective    Vital signs in last 24 hours  Temp:  [97.3  F (36.3  C)-98.8  F (37.1  C)] 98  F (36.7  C)  Heart Rate:  [71-84] 75  Resp:  [19-29] 19  BP: (114-171)/(73-98) 114/73  FiO2 (%):  [24 %] 24 %  Weight:   147 lb 8 oz (66.9 kg)    Intake/Output last 3 shifts  I/O last 3 completed shifts:  In: 3762 [I.V.:166; NG/GT:3596]  Out: 2550 [Urine:2550]  Intake/Output this shift:  I/O this shift:  In: 3 [I.V.:3]  Out: -     Physical Exam   General:awake, lethargic  Eyes: no pallor  Chest: Clear to auscultation bilaterally  Heart: RRR, normal S1 and S2  Abdomen: soft, non tender  Neuro:moving arms and legs spontaneously      Meds    amino acids-protein hydrolys  1 packet Enteral Tube DAILY     famotidine  20 mg Enteral Tube BID     heparin (PF) ANTICOAGULANT  5,000 Units Subcutaneous Q8H FIXED TIMES     insulin aspart (NovoLOG) injection   Subcutaneous Q6H FIXED TIMES     ipratropium-albuteroL  3 mL Nebulization Q6H - RT     Lactobacillus rhamnosus GG  1 capsule Enteral Tube Daily with brkfst     lisinopriL  40 mg Enteral Tube Daily     sodium chloride  3 mL Intravenous Line Care       Pertinent Labs   Lab Results: personally reviewed.   not applicable    Results from last 7 days   Lab Units 06/20/21  0548 06/19/21  0529 06/18/21  0637   LN-SODIUM mmol/L 143 143 142   LN-POTASSIUM mmol/L 4.0 4.2 4.0   LN-CHLORIDE mmol/L 103  104 104   LN-CO2 mmol/L 31 31 30   LN-BLOOD UREA NITROGEN mg/dL 34* 30* 31*   LN-CREATININE mg/dL 0.67* 0.65* 0.67*   LN-CALCIUM mg/dL 9.7 9.9 9.2         Results from last 7 days   Lab Units 06/18/21  0637 06/16/21  0639 06/15/21  0600   LN-WHITE BLOOD CELL COUNT thou/uL 6.1 6.7 8.1   LN-HEMOGLOBIN g/dL 9.0* 8.2* 8.6*   LN-HEMATOCRIT % 28.2* 26.5* 28.4*   LN-PLATELET COUNT thou/uL 229 250 288         Pertinent Radiology   Radiology Results: Not yet available for review    Xr Chest 1 View Portable    Result Date: 6/18/2021  EXAM: XR CHEST 1 VIEW PORTABLE LOCATION: [facility] DATE/TIME: 6/18/2021 4:37 PM INDICATION: increased secretions COMPARISON: 06/14/2021, 06/09/2021     Tracheostomy tube in place. Left basilar opacities partially silhouetting the left hemidiaphragm are unchanged and again presumed to reflect some atelectasis. Right lung is clear. Heart and pulmonary vascularity are normal. Gastrostomy tube.    Xr Chest 1 View Portable    Result Date: 6/14/2021  EXAM: XR CHEST 1 VIEW PORTABLE LOCATION: [facility] DATE/TIME: 6/14/2021 1:43 PM INDICATION: resp failure, pneumonia COMPARISON: Portable AP view of the chest 6/9/2021     The tracheostomy projects within the tracheal air column. Trachea is midline and central airways are patent. The lungs are symmetrically inflated. Focal opacity in the medial left base is typical location for subsegmental atelectasis. No interstitial thickening or airspace opacities elsewhere in the lungs. No pleural fluid or pneumothorax.     Ct Head Without Contrast    Result Date: 6/16/2021  EXAM: CT HEAD WO CONTRAST LOCATION: [facility] DATE/TIME: 6/16/2021 11:26 AM INDICATION: follow up on the size of the ventricles COMPARISON: CT 06/14/2021. TECHNIQUE: Routine CT Head without IV contrast. Multiplanar reformats. Dose reduction techniques were used. FINDINGS: INTRACRANIAL CONTENTS: Again seen are postop  changes from right frontotemporal craniotomy with aneurysm clip in the right parasellar region. Areas of stable low-density change in the anterior right temporal lobe and base of the right frontal lobe. Stable  mixed density thin extra-axial fluid collection deep to the craniotomy flap. Again seen are small scattered areas of residual subarachnoid hemorrhage in the cerebral sulci. Again seen are small amounts of blood products layering in the occipital horns of the lateral ventricles. These subarachnoid and intraventricular blood products have decreased mildly from 06/14/2021 with no new subarachnoid or intraventricular hemorrhage. Again seen is a broad recent acute-subacute infarct along the anterior paramedian right frontal lobe with stable localized anterior mass effect. A trace of petechial blood products has developed in the center of this infarct since 06/14/2021. No space-occupying hemorrhage. Stable presumed subacute infarct along the paramedian left posterior frontal lobe near the vertex. Again seen is some edema associated with a previous left frontal shunt tube catheter tract with decreased density of the associated blood products in this region and the interval. Stable mild enlargement of the lateral and third ventricles from 06/14/2021. Stable midline and normal caliber fourth ventricle. VISUALIZED ORBITS/SINUSES/MASTOIDS: No intraorbital abnormality. Mild mucosal thickening scattered about the paranasal sinuses. No middle ear or mastoid effusion. BONES/SOFT TISSUES: No acute abnormality.     1.  No significant change in the size of the mildly enlarged lateral and third ventricles from CT 06/14/2021. Slight decrease in the intraventricular hemorrhage in the occipital horns in the interval. 2.  Again seen is a broad area of evolving infarction involving the anterior paramedian right frontal lobe similar in size to the CT 06/14/2021. A small amount of petechial blood products have developed in the central  aspect of this infarct since 06/14/2021. No space-occupying hemorrhage. Continued follow-up recommended. 3.  Stable small subacute infarct along the paramedian superior left frontal lobe posteriorly. 4.  Again seen are low density changes associated with a previous left frontal approach ventricular catheter track with decrease in the density of blood products along the catheter track in the interval. 5.  Slight decrease in the scattered subarachnoid hemorrhage in the cerebral sulci in the interval. No definite new subarachnoid hemorrhage. 6.  Stable postop changes right frontotemporal craniotomy with aneurysm clip in the right parasellar region with stable mixed density extra-axial fluid and blood products deep to the craniectomy site.    Ct Head Without Contrast    Result Date: 6/14/2021  EXAM: CT HEAD WO CONTRAST LOCATION: [facility] DATE/TIME: 6/14/2021 1:44 PM INDICATION: Follow-up intracranial hemorrhage. Lethargy. COMPARISON: CT head 06/11/2021 TECHNIQUE: Routine CT Head without IV contrast. Multiplanar reformats. Dose reduction techniques were used. FINDINGS: INTRACRANIAL CONTENTS: Right frontoparietal and pterional craniotomy changes with right paraclinoid aneurysm clip as seen previously. Slight interval decrease in residual mixed attenuation extra-axial fluid and blood products deep to the craniotomy flap which currently measures up to C6-C7 millimeters in maximum radial thickness compared to 8 mm previously along the anterior-inferior right frontal lobe. Small volume subarachnoid hemorrhage remains within the posterior most aspect of the left sylvian fissure and adjacent parietal sulci, decreased since the previous exam. Small volume residual intraventricular hemorrhage layering within the temporal horns bilaterally. No definite new hemorrhage or enlarging extra-axial collection. Subacute infarct of the anterior parasagittal right frontal lobe. Small amount of parenchymal blood  products along an old left frontal catheter tract, decreased since the prior exam. Mild lateral and third ventriculomegaly, slightly increased since the prior exam. For example, transverse diameter of the frontal horns measures up to 4 cm compared to 3.8 cm previously. VISUALIZED ORBITS/SINUSES/MASTOIDS: No intraorbital abnormality. Layering partially aerated secretions within the bilateral sphenoid sinuses, similar to the prior exam. Minor mucosal thickening inferior right maxillary sinus. No middle ear or mastoid effusion. BONES/SOFT TISSUES: No new calvarial defect.     1.  Slight interval increase in the caliber of the lateral and third ventricles without evidence for outflow obstruction. Developing communicating hydrocephalus is possible. Follow-up is advised. 2.  Multi compartmental intracranial hemorrhage with decreasing conspicuity of hyperattenuating blood products compared to 06/11/2021. No new hemorrhage or enlarging extra-axial collection identified. 3.  Subacute infarct of the parasagittal right frontal lobe.    Ct External Imaging Head    Result Date: 6/11/2021  Images were obtained from an external facility.  Click PACS Images hyperlink to view images.  Textual results have been scanned into the media tab.    Ct External Imaging Head    Result Date: 6/11/2021  Images were obtained from an external facility.  Click PACS Images hyperlink to view images.  Textual results have been scanned into the media tab.    Xr External Imaging    Result Date: 6/11/2021  Images were obtained from an external facility.  Click PACS Images hyperlink to view images.  Textual results have been scanned into the media tab.    Xr External Imaging Chest    Result Date: 6/11/2021  Images were obtained from an external facility.  Click PACS Images hyperlink to view images.  Textual results have been scanned into the media tab.          Advanced Care Planning:  Discharge Planning discussed with patient and wife  Anticipated LOS:  TBD  Barrier to discharge:acute issues  Disposition:TBD  Discussed care with patient for >35 minutes with greater than 50% of time spent in counseling and coordination of care.      Sheri Beltre MD  Hospitalist  [phone]    This note was dictated using voice recognition software. Any grammatical or context distortions are unintentional and inherent to the software.

## 2021-06-26 NOTE — PROGRESS NOTES
Progress Note    Brief summary:   70 y.o. old male with past medical history of hypertension who presented to ED on 5/15/2021 for altered mental status. He had a fall and was found unresponsive by his wife. He was found to have acute subarachnoid hemorrhage.  On 5/15, he underwent left external ventricular drain placement.  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 One the same day, he also underwent craniotomy with clipping of PCOM aneurysm.  On 5/28/2021, patient had persistently elevated TCD, CT head showed bilateral BAYLEE infarct.  Next day, for persistent TCD and CTA showing bilateral BAYLEE vasospasm, was treated with milrinone and verapamil.  On 6/1/2021, angiogram showed no evidence of vasospasm.  He was extubated on 6/3/2021.On 6/6/2021, patient was reintubated due to drop in his saturation.  On 6/7/2021, patient underwent tracheostomy and PEG tube placement.  On 6/9/2021, EVD was removed.  Patient was initially started on Unasyn for possible pneumonia on 5/20/2021 which was switched to ceftriaxone the next day for sputum culture growing Klebsiella.  On 5/23, sputum culture also grew Pseudomonas for which ceftriaxone was switched to Zosyn.  Due to increased white blood cell counts, possibility of ventriculitis was raised however CSF was negative but antibiotic was switched to cefepime and vancomycin on 5/28/2021 with plan to continue for 2 weeks.  Vanco has been discontinued on 6/9/2021.  On 6/5/2021, sputum grew ESBL so cefepime was switched to meropenem which he is on currently.    Assessment/Plan  Principal Problem:    SAH (subarachnoid hemorrhage) (H)      #Acute hypoxic respiratory failure status: Initially postoperatively but later complicated by pneumonia.  On mechanical ventilation, tracheostomy done on 6/7/2021.  Vent management per RT and pulmonology     #Recent subarachnoid hemorrhage: Presented with fall and unresponsiveness on 5/15/2021.  S/p EVD, angiogram showing ruptured  posterior communicating aneurysm followed by craniotomy with clipping of PCOM aneurysm on the same day.  EVD removed on 6/9/2021.  Postoperative period was complicated by vasospasm, bilateral BAYLEE infarct. MRI done on 5/28 showed right frontal parietal, temopral and left parasagittal parietal lobes infarcts.  On 6/1/2021, angiogram showed no vasospasm. Will need to follow up with Dr. Martinez in Neurosurgery clinic in 3 months with repeat CTA of head and neck for post-operative follow up     #Acute metabolic encephalopathy: likely due to bleed, infarcts. On restraints- mittens.     #Hospital acquired pneumonia: Sputum cultures from prior hospitalization showed Klebsiella, Pseudomonas.  Patient has been on antibiotics since 5/20/2021.  On 6/5/2021, was sputum grew ESBL for which patient is on meropenem, until 6/19/2021.     #Oropharyngeal dysphagia: On tube feeding via PEG tube, dietitian, speech consult     #Hypertension: On lisinopril     #Diarrhea: C diff checked at [place] was negative. Likely abx associated diarrhea.  On rectal tube, will remove once diarrhea is better    Subjective  Patient seen and examined  Patient was asleep and was arousable but fatigued.    Objective    Vital signs in last 24 hours  Temp:  [97.9  F (36.6  C)-99.3  F (37.4  C)] 99.3  F (37.4  C)  Heart Rate:  [] 84  Resp:  [12-34] 18  BP: (115-201)/() 115/68  FiO2 (%):  [35 %-45 %] 40 %  Weight:   145 lb 11.2 oz (66.1 kg)    Intake/Output last 3 shifts  I/O last 3 completed shifts:  In: 860 [NG/GT:740; IV Piggyback:120]  Out: 1050 [Urine:1050]  Intake/Output this shift:  I/O this shift:  In: 151 [NG/GT:30; IV Piggyback:121]  Out: 450 [Urine:450]    Physical Exam   General:fatigued  Eyes: no pallor  Chest: Clear to auscultation bilaterally  Heart: RRR, normal S1 and S2  Abdomen: soft, non tender  Neuro: unable to assess      Meds    famotidine  20 mg Enteral Tube BID     heparin (PF) ANTICOAGULANT  5,000 Units Subcutaneous Q8H  FIXED TIMES     ipratropium-albuteroL  3 mL Nebulization Q6H - RT     lisinopriL  10 mg Enteral Tube Daily     meropenem (MERREM) IVPB in 100mL  2 g Intravenous Q8H       Pertinent Labs   Lab Results: personally reviewed.   not applicable    Results from last 7 days   Lab Units 06/12/21  0522   LN-SODIUM mmol/L 145   LN-POTASSIUM mmol/L 3.9   LN-CHLORIDE mmol/L 105   LN-CO2 mmol/L 31   LN-BLOOD UREA NITROGEN mg/dL 27*   LN-CREATININE mg/dL 0.70   LN-CALCIUM mg/dL 9.4         Results from last 7 days   Lab Units 06/12/21  0522   LN-WHITE BLOOD CELL COUNT thou/uL 8.3   LN-HEMOGLOBIN g/dL 8.8*   LN-HEMATOCRIT % 28.0*   LN-PLATELET COUNT thou/uL 391         Pertinent Radiology   Radiology Results: Not yet available for review    Ct External Imaging Head    Result Date: 6/11/2021  Images were obtained from an external facility.  Click PACS Images hyperlink to view images.  Textual results have been scanned into the media tab.    Ct External Imaging Head    Result Date: 6/11/2021  Images were obtained from an external facility.  Click PACS Images hyperlink to view images.  Textual results have been scanned into the media tab.    Xr External Imaging    Result Date: 6/11/2021  Images were obtained from an external facility.  Click PACS Images hyperlink to view images.  Textual results have been scanned into the media tab.    Xr External Imaging Chest    Result Date: 6/11/2021  Images were obtained from an external facility.  Click PACS Images hyperlink to view images.  Textual results have been scanned into the media tab.          Advanced Care Planning:  Discharge Planning discussed with patient   Anticipated LOS: TBD  Barrier to discharge:acute issues  Disposition:TBD  Discussed care with patient for >35 minutes with greater than 50% of time spent in counseling and coordination of care.      Sheri Beltre MD  Hospitalist  [phone]    This note was dictated using voice recognition software. Any grammatical or  context distortions are unintentional and inherent to the software.

## 2021-06-26 NOTE — PROGRESS NOTES
NEUROLOGY PROGRESS NOTE     ASSESSMENT & PLAN   Hospital Day#: 11  Visit diagnosis: Encephalopathy    Encephalopathy  Subarachnoid hemorrhage  BAYLEE stroke    Recent head CT shows stable ventricles.  There is broad area of evolving infarction in the anterior paramedian right frontal lobe with a small amount of petechial blood products  There is also a paramedian superior left frontal lobe subacute infarct  There is scattered subarachnoid hemorrhage in the cerebral sulci  Electrolytes, UA, ammonia noncontributory  EEG pending  Suspect toxic metabolic encephalopathy with superimposed sundowning.  Sundowning precautions.  Encourage sleep at night and staying awake during the daytime    We will follow intermittently.  Please call with any further questions.    Neurology Discharge Planning :  TBD    Patient Active Problem List    Diagnosis Date Noted     Encounter for palliative care      Attention to tracheostomy (H)      Acute and chronic respiratory failure with hypoxia (H)      On mechanically assisted ventilation (H)      ESBL (extended spectrum beta-lactamase) producing bacteria infection      Acute respiratory failure with hypoxia (H)      SAH (subarachnoid hemorrhage) (H) 06/11/2021     Past Medical History: Patient  has no past medical history on file.     SUBJECTIVE     Dr. Gaona note:  Dayron is a 70-year-old man seen at [facility] unit. He has a complex neurologic history recently. He initially presented on May 15 after he was found unresponsive by his wife. Evaluation showed acute subarachnoid hemorrhage. He had left extraventricular drain placed. 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 Subsequently he had elevated velocities on transcranial Dopplers and developed anterior cerebral artery infarcts, right worse than left. He failed extubation and was reintubated, he had trach and PEG placed. Extraventricular drain was removed. He is  now transferred here to [facility] for further management. He has been weaning from the ventilator a bit, and presently is on CPAP. On Saturday he was more responsive, he was following commands nicely with occupational therapy.    6/22/21  Patient seen and case discussed with the family.  Patient appears to be more alert to me compared to previous examination described.  Family feels he is regressing.  It appears that he sleeping during the daytime and staying awake at nighttime.  Discussed that there might be some sundowning going on.  His examination does not show any focal deficits.  Reassured the family that there is no new damage that is happening at this point.  If this is mainly sundowning/encephalopathy it should improve with time.  EEG has not been done so far.     OBJECTIVE     Vital signs in last 24 hours  Temp:  [98.1  F (36.7  C)-98.5  F (36.9  C)] 98.1  F (36.7  C)  Heart Rate:  [74-95] 76  Resp:  [19-32] 25  BP: (110-139)/(71-82) 139/82  FiO2 (%):  [24 %-30 %] 24 %    Weight:   139 lb 8 oz (63.3 kg)    I/O last 24 hours    Intake/Output Summary (Last 24 hours) at 6/22/2021 2240  Last data filed at 6/22/2021 2224  Gross per 24 hour   Intake 3953 ml   Output 2100 ml   Net 1853 ml       Review of Systems   Patient is unable to provide history due to altered mental status.    Vitals-Reviewed in chart  GENERAL -Health appearing, No apparent distress  EYES- No scleral icterus, no eyelid droop, Pupils - see Neuro section  HEENT - Normocephalic, atraumatic, Hearing possibly limited versus intact unable to determine; Oral mucosa moist and pink in color. External Ears and nose intact.   Neck - supple with no obvious lymphadenopathy or thyromegaly  PULM -trached.  CV- Pedal pulses present with no significant peripheral edema/ No significant varicosities.  MSK- Gait - see Neuro section; Strength and tone- see Neuro section; Range of motion grossly intact.  Psych-not agitated.    Neurological  Mental status -  Patient is awake with no speech.  Attention slightly decreased.  Questionably follows commands.   Cranial nerves -pupils are symmetric.  Nasolabial folds are symmetric.  Visual fields are questionably intact to threat.  Horizontal extraocular movements appear to be intact.    Motor -moves all 4 extremities to painful stimulation.  No spontaneous movement though had restraints on.  Tone - Tone is symmetric bilaterally in upper and lower extremities.   Sensation -responds to pain in all 4 extremities.  Coordination - Finger to nose and heel to shin -patient is unable to do.  Gait and station -unable to ambulate due to mental status       DIAGNOSTIC STUDIES     Pertinent Radiology   CT Head  IMPRESSION:   1.  No significant change in the size of the mildly enlarged lateral and third ventricles from CT 06/14/2021. Slight decrease in the intraventricular hemorrhage in the occipital horns in the interval.  2.  Again seen is a broad area of evolving infarction involving the anterior paramedian right frontal lobe similar in size to the CT 06/14/2021. A small amount of petechial blood products have developed in the central aspect of this infarct since   06/14/2021. No space-occupying hemorrhage. Continued follow-up recommended.  3.  Stable small subacute infarct along the paramedian superior left frontal lobe posteriorly.  4.  Again seen are low density changes associated with a previous left frontal approach ventricular catheter track with decrease in the density of blood products along the catheter track in the interval.  5.  Slight decrease in the scattered subarachnoid hemorrhage in the cerebral sulci in the interval. No definite new subarachnoid hemorrhage.  6.  Stable postop changes right frontotemporal craniotomy with aneurysm clip in the right parasellar region with stable mixed density extra-axial fluid and blood products deep to the craniectomy site.      Recent Results (from the past 24 hour(s))   POCT Glucose     Collection Time: 06/21/21 11:53 PM    Specimen: Blood   Result Value Ref Range    Glucose 133 70 - 139 mg/dL   POCT Glucose    Collection Time: 06/22/21  5:06 AM    Specimen: Blood   Result Value Ref Range    Glucose 131 70 - 139 mg/dL   Basic Metabolic Panel    Collection Time: 06/22/21  6:08 AM   Result Value Ref Range    Sodium 139 136 - 145 mmol/L    Potassium 4.3 3.5 - 5.0 mmol/L    Chloride 102 98 - 107 mmol/L    CO2 29 22 - 31 mmol/L    Anion Gap, Calculation 8 5 - 18 mmol/L    Glucose 108 70 - 125 mg/dL    Calcium 9.6 8.5 - 10.5 mg/dL    BUN 34 (H) 8 - 22 mg/dL    Creatinine 0.66 (L) 0.70 - 1.30 mg/dL    GFR MDRD Af Amer >60 >60 mL/min/1.73m2    GFR MDRD Non Af Amer >60 >60 mL/min/1.73m2   POCT Glucose    Collection Time: 06/22/21 11:39 AM    Specimen: Blood   Result Value Ref Range    Glucose 134 70 - 139 mg/dL   Urinalysis-UC if Indicated    Collection Time: 06/22/21 11:45 AM   Result Value Ref Range    Color, UA Light Yellow Light Yellow, Yellow    Clarity, UA Clear Clear    Glucose, UA Negative Negative    Protein, UA Negative Negative    Bilirubin, UA Negative Negative    Urobilinogen, UA <2.0 mg/dL <2.0 mg/dL    pH, UA 7.0 5.0 - 8.0    Blood, UA Negative Negative    Ketones, UA Negative Negative    Nitrite, UA Negative Negative    Leukocytes, UA Negative Negative    Specific Gravity, UA 1.011 1.001 - 1.030   POCT Glucose    Collection Time: 06/22/21 12:23 PM    Specimen: Capillary; Blood   Result Value Ref Range    Glucose 129 70 - 139 mg/dL   POCT Glucose    Collection Time: 06/22/21  5:57 PM    Specimen: Blood   Result Value Ref Range    Glucose 121 70 - 139 mg/dL         HOSPITAL MEDICATIONS       amino acids-protein hydrolys  1 packet Enteral Tube DAILY     famotidine  20 mg Enteral Tube BID     heparin (PF) ANTICOAGULANT  5,000 Units Subcutaneous Q8H FIXED TIMES     insulin aspart (NovoLOG) injection   Subcutaneous Q6H FIXED TIMES     ipratropium-albuteroL  3 mL Nebulization Q6H - RT      Lactobacillus rhamnosus GG  1 capsule Enteral Tube Daily with brkfst     lisinopriL  40 mg Enteral Tube Daily     menthol-zinc oxide   Topical TID        Total time spent for face to face visit, reviewing chart/labs/imaging studies, counseling and coordination of care was: Over 35 min More than 50% of this time was spent on counseling and coordination of care.    Patient new to me.  Counseling family.    Alton Richards MD  Neurologist  [facility]  Tel:- [phone]    This note was dictated using voice recognition software.  Any grammatical or context distortions are unintentional and inherent to the software.

## 2021-06-26 NOTE — PROGRESS NOTES
Therapeutic Recreation Assessment     06/14/21 1500   Time Spent With Patient   Treatment Start Time 1500   Treatment End Time 1530   Leisure Interests   Leisure Interests Exercise;Gardening;Movies/TV;Listening to music;Reading;Social activities;Other (Comment)  (sailing every week)   Prior Status   Independent With Leisure/Interests   Indoor Mobility Independent   Lives With Spouse   Vocational Retired   Vision-Basic Assessment   Current Vision Wears glasses all the time   Glasses on During Eval/Cognitive Testing No   Cognition   Responsiveness Impaired   Attention KWAME   Orientation KWAME   Memory KWAME   Evaluation   Visual AIds None   Hearing Aids None   Arousal/Alertness Lethargic   Communication Unable to communicate   Direction Following Unable to follow   Pt's eyes open, questionable eye contact x1. Family was on video chat so obtained leisure and personal history from them. Pt was quite active, an outdoors person per family. Pt went sailing with his brother every week for 20 years. Oriented family to leisure resources available to pt, they expressed interest in a radio which will be provided. Will monitor pt through documentation and staff rounding for updates. Sarah Sanchez, CTRS

## 2021-06-26 NOTE — PROGRESS NOTES
[facility] Daily Progress Note      Date of Service: 6/19/2021     Assessment and plan    Acute metabolic encephalopathy  Considered multifactorial  ; Needing restraints  ; CT head on 6/14 show some possible increase in ventricular size however repeat CT on 6/16 did not show any acute changes  ; Was seen by neurology yesterday    Recent subarachnoid hemorrhage:   Presented with fall and unresponsiveness on 5/15/2021.  S/p EVD, angiogram showing ruptured posterior communicating aneurysm followed by craniotomy with clipping of PCOM aneurysm on the same day.  EVD removed on 6/9/2021.    Postoperative period was complicated by vasospasm, bilateral BAYLEE infarct. MRI done on 5/28 showed right frontal parietal, temopral and left parasagittal parietal lobes infarcts.    On 6/1/2021, angiogram showed no vasospasm.   Will need to follow up with Dr. Martinez in Neurosurgery clinic in 3 months with repeat CTA of head and neck for post-operative follow up    Hypernatremia: due to dehydration.  : Resolved  ; Sodium 143 today    Leukocytosis  Resolved     Acute hypoxic respiratory failure:   Initially postoperatively but later complicated by pneumonia.  On mechanical ventilation, tracheostomy done on 6/7/2021.    Vent management per RT and pulmonology. Currently on phase 1 weaning.     Hospital acquired pneumonia: Sputum cultures from prior hospitalization showed Klebsiella, Pseudomonas.  Patient has been on antibiotics since 5/20/2021.  On 6/5/2021, was sputum grew ESBL for which patient is on meropenem, until 6/19/2021.     Oropharyngeal dysphagia: On tube feeding via PEG tube, dietitian, speech consult. Video swallow 6/14     Hypertension: On lisinopril     Moderate malnutrition     Hypokalemia - likely secondary to GI loss - resolved.  -On K protocol, RN to manage         This note was dictated using voice recognition software. Any grammatical or context distortions are unintentional and inherent to the software.  Subjective:    Patient seen at bedside with PT and nursing staff  He was being moved from bed to chair with the help of Govind lift    As per physical therapy, patient woke up in open eyes during movement, he needed maximal assist sitting up at end of bed.  He was having some spontaneous hand movement, and did move his left leg a little bit    As per nursing staff, he was trying to pull IV line when he was awake      Brief History: 70 y.o. old male with past medical history of hypertension who presented to ED on 5/15/2021 for altered mental status. He had a fall and was found unresponsive by his wife. He was found to have acute subarachnoid hemorrhage.  On 5/15, he underwent left external ventricular drain placement.  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 One the same day, he also underwent craniotomy with clipping of PCOM aneurysm.  On 5/28/2021, patient had persistently elevated TCD, CT head showed bilateral BAYLEE infarct.  Next day, for persistent TCD and CTA showing bilateral BAYLEE vasospasm, was treated with milrinone and verapamil.  On 6/1/2021, angiogram showed no evidence of vasospasm.  He was extubated on 6/3/2021.On 6/6/2021, patient was reintubated due to drop in his saturation.  On 6/7/2021, patient underwent tracheostomy and PEG tube placement.  On 6/9/2021, EVD was removed.  Patient was initially started on Unasyn for possible pneumonia on 5/20/2021 which was switched to ceftriaxone the next day for sputum culture growing Klebsiella.  On 5/23, sputum culture also grew Pseudomonas for which ceftriaxone was switched to Zosyn.  Due to increased white blood cell counts, possibility of ventriculitis was raised however CSF was negative but antibiotic was switched to cefepime and vancomycin on 5/28/2021 with plan to continue for 2 weeks.  Vanco has been discontinued on 6/9/2021.  On 6/5/2021, sputum grew ESBL so cefepime was switched to meropenem which he is on currently.   He was transferred to LTACH  "on 6/11/21.     6/14/21 CT head done for fatigue- read as slight increase in caliber of lateral and third ventricle  With possibility of developing communicating hydrocephalus. Discussed with Dr. Casillas (neurosurgeon at [facility]), who did not think there was much change, however suggested repeating CT head on 6/16/21 which did not show any change.    Chart reviewed, events noted. Pt seen and examined.     Objective     Vital signs in last 24 hours:  Temp:  [98.5  F (36.9  C)-98.9  F (37.2  C)] 98.7  F (37.1  C)  Heart Rate:  [79-95] 88  Resp:  [24-30] 25  BP: (146-164)/(85-98) 159/87  FiO2 (%):  [24 %] 24 %  Weight:   147 lb 8 oz (66.9 kg)  Weight change:   Body mass index is 20.57 kg/m .    Intake/Output last 3 shifts:  I/O last 3 completed shifts:  In: 4382 [I.V.:56; NG/GT:3993; IV Piggyback:333]  Out: 3945 [Urine:3945]  Intake/Output this shift:  I/O this shift:  In: 370 [NG/GT:370]  Out: -       Physical Exam:  /87 (Patient Position: Lying)   Pulse 88   Temp 98.7  F (37.1  C) (Axillary)   Resp 25   Ht 5' 11\" (1.803 m)   Wt 147 lb 8 oz (66.9 kg)   SpO2 97%   BMI 20.57 kg/m      FiO2 (%): 24 % O2 Device: Ventilator      General Appearance:   Sitting up in bed, looks tired   HEENT:    Normocephalic. Pupils equal and reactive. No scleral   Icterus. Moist oral mucosa   Healed surgical scar on scalp   Lungs:     Clear to auscultation bilaterally, respirations unlabored   Heart::    Regular rate and rhythm, S1 and S2 normal, no murmur, rub   or gallop. No peripheral edema.   Abdomen:   Soft, Non tender. Non distended. Bowel sounds present.  G-tube in place   Extremity :   No deformity   Pulses:   2+ and symmetric all extremities   CNS:  Wakes up with movement otherwise falls back to sleep  No clear facial asymmetry         Scheduled Meds:    amino acids-protein hydrolys  1 packet Enteral Tube DAILY     famotidine  20 mg Enteral Tube BID     heparin (PF) ANTICOAGULANT  5,000 Units Subcutaneous Q8H FIXED TIMES "     insulin aspart (NovoLOG) injection   Subcutaneous Q6H FIXED TIMES     ipratropium-albuteroL  3 mL Nebulization Q6H - RT     Lactobacillus rhamnosus GG  1 capsule Enteral Tube Daily with brkfst     lisinopriL  40 mg Enteral Tube Daily     meropenem (MERREM) IVPB in 100mL  2 g Intravenous Q8H     nystatin  5 mL Swish & Swallow QID     sodium chloride  3 mL Intravenous Line Care     Continuous Infusions:  PRN Meds:.acetaminophen **OR** acetaminophen, alteplase, dextrose 50 % (D50W), glucagon (human recombinant), hydrALAZINE, HYDROmorphone, naloxone **OR** naloxone **OR** naloxone **OR** naloxone, sodium chloride 0.9%, sodium chloride    Lab Results:  personally reviewed.   not applicable  Recent Results (from the past 24 hour(s))   POCT Glucose    Collection Time: 06/18/21 11:32 AM    Specimen: Blood   Result Value Ref Range    Glucose 131 70 - 139 mg/dL   Procalcitonin    Collection Time: 06/18/21  4:09 PM   Result Value Ref Range    Procalcitonin 0.07 0.00 - 0.49 ng/mL   POCT Glucose    Collection Time: 06/18/21  5:38 PM    Specimen: Blood   Result Value Ref Range    Glucose 111 70 - 139 mg/dL   POCT Glucose    Collection Time: 06/18/21 11:50 PM    Specimen: Blood   Result Value Ref Range    Glucose 106 70 - 139 mg/dL   POCT Glucose    Collection Time: 06/19/21 12:15 AM    Specimen: Capillary; Blood   Result Value Ref Range    Glucose 125 70 - 139 mg/dL   POCT Glucose    Collection Time: 06/19/21  5:15 AM    Specimen: Blood   Result Value Ref Range    Glucose 114 70 - 139 mg/dL   Basic Metabolic Panel    Collection Time: 06/19/21  5:29 AM   Result Value Ref Range    Sodium 143 136 - 145 mmol/L    Potassium 4.2 3.5 - 5.0 mmol/L    Chloride 104 98 - 107 mmol/L    CO2 31 22 - 31 mmol/L    Anion Gap, Calculation 8 5 - 18 mmol/L    Glucose 117 70 - 125 mg/dL    Calcium 9.9 8.5 - 10.5 mg/dL    BUN 30 (H) 8 - 22 mg/dL    Creatinine 0.65 (L) 0.70 - 1.30 mg/dL    GFR MDRD Af Amer >60 >60 mL/min/1.73m2    GFR MDRD Non Af  Amer >60 >60 mL/min/1.73m2     Results from last 7 days   Lab Units 06/19/21  0515 06/19/21  0015 06/18/21  2350 06/18/21  1738 06/18/21  1132 06/18/21  0521 06/17/21  2328 06/17/21  1717 06/17/21  1129 06/17/21  0537   LN-POC GLUCOSE FINGERSTICK- HE mg/dL 114 125 106 111 131 128 110 134 106 131           Advance Care Planning:   Barriers to discharge: Multiple issues  Anticipated discharge day: Few days  Disposition: Depending on PT OT      Lori Jameson MD  [facility]  Hospitalist

## 2021-06-26 NOTE — PLAN OF CARE
Problem: Safety  Goal: Patient will be injury free during hospitalization  6/19/2021 1735 by Petr Patton, MARCO  Outcome: Progressing  6/19/2021 1418 by Petr Patton RN  Outcome: Progressing     Problem: Daily Care  Goal: Daily care needs are met  6/19/2021 1735 by Petr Patton RN  Outcome: Progressing  6/19/2021 1418 by Petr Patton RN  Outcome: Progressing   More awake , does not follow commands. LOts of tracheal secretions, yellow in color,,oozing drainage. Tachypneic.Scalp suture removed. Afebrile. Elevated BP, 171/98 hydralazine 10 mg  IV PRN . Latest /84.  Petr Patton RN

## 2021-06-26 NOTE — PLAN OF CARE
Patient still has bilateral wrist restraints on, still tries to pull at tubing when off , Will continue to monitor.

## 2021-06-26 NOTE — PLAN OF CARE
Care Management Progression of Care Update        DR GLOS - Target D/C Date 21        PLAN/GOALS  1. Pulmonary following. Phase 1 wean~CPAP/PS during the day as tolerated.  Vent settings PRVC/AC @ 30%.  Frequent suctioning 4x/shift. Duo-neb three times a day for pulmonary hygiene.    2.  Nutrition management.  Tube feeding via PEG placed 21.  Registered dietician to monitor tube feeding tolerance, weight and labs.    3.  Neurology consult~for increased lethargy.    4. PT/OT 5x/week.    5.  Speech therapy~evaluate swallow/secretion management 3x/week.    6.  Wound care consult.      BARRIERS  1.  Acute hypoxic respiratory failure due to subarachnoid hemorrhage.  Vent / trach / wean.    2.  Acute metabolic encephalopathy.    3.  Oropharyngeal dysphagia.    3.   Bilateral mitts for safety.        Disposition: Discharge goal is pending response to treatment, medical needs and mobility closer to discharge.    Care Manager Name:  Jacqueline Vieyra RN,BSN, HNB-BC    Date/Time:  06/15/21 @ 8:56 AM

## 2021-06-26 NOTE — PLAN OF CARE
Problem: Occupational Therapy  Goal: OT Goals  Description: Patient will demonstrate the following by 7/16/21, in order to maximize independence with ADL/IADL performance:      -Demonstrate safety with Bed/Chair/Toilet transfer with mod A   -Complete UB dressing with min A,caryn tech as approp   -Participate in grooming/hygiene tasks with CGA in sitting  -Pt to demo bed mobility with max A  -Pt to tolerate sitting EOB w/max A 8 minutes   -Patient will participate in further cognitive testing in order to assist with safe discharge planning  -Patient will participate in further visual assessing  in order to assist with safe discharge planning   -Patient will participate in 15 minutes of UE Exercise/activity to increase activity tolerance for daily tasks/trf   -Pt to attend to therapy task 15 minute w/min A-vc for ease of ADL completion.     Goals entered on 6/12/2021 by Manuelito Liu, OTRL/ASHISH       Outcome: Not Applicable this Shift

## 2021-06-26 NOTE — PLAN OF CARE
Problem: Mechanical Ventilation  Goal: Patient will maintain patent airway  Outcome: Progressing  Goal: Tracheostomy will be managed safely  Outcome: Progressing  Goal: Respiratory status - ventilation  Description: Movement of air in and out of the lungs.    Liberate from ventilator  Outcome: Progressing   RT PROGRESS NOTE     DATA:     CURRENT SETTINGS:  PRVC/AC 14, 450, +5, 35%             TRACH TYPE / SIZE: # 6 SHILEY DCT  Placed  On 6/11/21             MODE:  PRVC/AC             FIO2:  35%     ACTION:             THERAPIES: Duoneb q6hr              SUCTION:                           FREQUENCY: x2                        AMOUNT: large to copious                         CONSISTENCY: thick/thin                        COLOR: Yellow              SPONTANEOUS COUGH EFFORT/STRENGTH OF EFFORT (not elicited by suctioning): Fair                               WEANING PHASE: 1                        WEAN MODE: CPAP/PS                         WEAN TIME:  will start tomorrow am                        END WEAN REASON:       RESPONSE:             BS:  Coarse             VITAL SIGNS: sat %, HR 74-83, RR 21-25             EMOTIONAL NEEDS / CONCERNS:  no             RISK FOR SELF DECANNULATION:  no                        RISK DUE TO:                          INTERVENTION/S IN PLACE IS/ARE:        NOTE / PLAN:  Cont. Current plan

## 2021-06-26 NOTE — PLAN OF CARE
Problem: Mechanical Ventilation  Goal: Patient will maintain patent airway  Outcome: Progressing   Weaning on vent. Drainage and oozing, yellow green secretions noted at trach area. Gauze changed 3x this shift.Seen by Pulmonary.     Problem: Risk of Injury Due to Unsafe Behavior  Goal: Patient will remain safe while in restraint; physical/psychological needs will be met  Outcome: Progressing   Patient is alert and following some commands. No restlessness/agitations. Touches his trach at times.Bilateral mitts applied.     Problem: Risk for Fluid Volume Deficit  Goal: Deficient fluid volume related to excessive extracellular fluid losses or decreased fluid intake  Outcome: Progressing   Na level is 147. D5 is running at 75 ml/hr.Water flush-400 ml is given every 4 hrs.Will recheck Na level at 1800H this alejandro.  On Potassium protocol.Replaced with 2 doses. Will re check K level at 1700H this alejandro.    Problem: Psychosocial Needs  Goal: Collaborate with patient/family/caregiver to identify patient specific goals for this hospitalization  Outcome: Progressing   Up in chair.Cooperating with cares. He is interacting with his wife by gestures through ipad. Wife is updated.

## 2021-06-26 NOTE — PLAN OF CARE
Problem: Mechanical Ventilation  Goal: Patient will maintain patent airway  Outcome: Progressing  Goal: Tracheostomy will be managed safely  Outcome: Progressing  Goal: Respiratory status - ventilation  Description: Movement of air in and out of the lungs.    Liberate from ventilator  Outcome: Progressing      RT PROGRESS NOTE     DATA:     CURRENT SETTINGS: Vent setting RR 14,  l, peep 5             TRACH TYPE / SIZE:  Shiley # 6 place on 6/7/21             MODE:   PRVC/AC             FIO2:  30%     ACTION:             THERAPIES:   Duo-neb three times a day              SUCTION:                           FREQUENCY:  x 4                        AMOUNT:Moderate/Small                         CONSISTENCY:  Thin                         COLOR:  Pale yellow              SPONTANEOUS COUGH EFFORT/STRENGTH OF EFFORT (not elicited by suctioning): Yes/Strong                               WEANING PHASE:   1                        WEAN MODE:    CPAP/PS  Days as tolerated                         WEAN TIME: 6 hrs on day shift                         END WEAN REASON:  Apnea per rt report .     RESPONSE:             BS:  Coarse              VITAL SIGNS:   SpO2 95-99%, HR 75-80 RR  17-26             EMOTIONAL NEEDS / CONCERNS:  None                 RISK FOR SELF DECANNULATION:                          RISK DUE TO:                          INTERVENTION/S IN PLACE IS/ARE:    n/a      NOTE / PLAN:           Plan resume CPAP/PS in am.

## 2021-06-26 NOTE — PLAN OF CARE
Problem: Safety  Goal: Patient will be injury free during hospitalization  Outcome: Progressing     Problem: Daily Care  Goal: Daily care needs are met  Outcome: Progressing   Vitals stable. No signs of pain or discomfort noted. Was repositioned every 2 hours. Remains of restraints for his safety. Will continue plan of cares. Patient slept 5-6 hours this shift.

## 2021-06-26 NOTE — PROGRESS NOTES
Hospitalist Progress Note    Assessment/Plan    A: Patient is a 71 yo man who has hypertension. Patient initially presented on 15-May-2021 to [facility] after being found unresponsive by his wife. Patient was then found to have a subarachnoid hemorrhage secondary to a ruptured aneurysm. Patient was intubated and was started on mannitol, IV antihypertensives and hyperventilation and patient was then transferred to [facility]. Patient underwent aneurysm clipping as well as placement of extra-ventricular drain from hydrocephalus on 15-May-2021. Extra-ventricular drain would malfunction and would require replacement on 04-Jun-2021 and 06-Jun-2021. Extra-ventricular drain reportedly was removed on 09-Jun-2021. Patient had intra-arterial vasospasm treatment with verapamil on 28-May-2021.     Patient was extubated on 16-May-2021 but had to be reintubated on 19-May-2021. Patient would be extubated on 03-Jun-2021 but would be reintubated on 06-Jun-2021. Patient had tracheostomy and PEG-tube placed on 07-Jun-2021. Patient required treatment for hospital-acquired pneumonia. Patient was transferred to LT[facility] on 11-Jun-2021.     P:  1.) Acute hypoxemic respiratory failure: Patient has tracheostomy in place. Monitoring respiratory status. Pulmonology seeing.  2.) Recent subarachnoid hemorrhage: Awaiting EEG results.  3.) Metabolic encephalopathy: Monitoring for safety.  4.) Hypertension: Reducing dose of lisinopril.  5.) Oropharyngeal dysphagia: Patient on tube feeds.   6.) Moderate protein-calorie malnutrition: Supplementing as able.  7.) Discussed with patient's family. Questions answered to the best of my ability.      Principal Problem:    SAH (subarachnoid hemorrhage) (H)  Active Problems:    Acute respiratory failure with hypoxia (H)    ESBL (extended spectrum beta-lactamase) producing bacteria infection    Attention to tracheostomy (H)    Acute and chronic respiratory failure with hypoxia  (H)    On mechanically assisted ventilation (H)    Encounter for palliative care    Encephalopathy    Metabolic encephalopathy    Idiopathic hypertension    Oropharyngeal dysphagia    Moderate protein-calorie malnutrition (H)      Subjective    Patient was not answering questions. Per family, patient would be sleeping for most of the day. Patient reportedly answered a few simple questions when asked by his daughter earlier.    Objective    Vital signs in last 24 hours  Temp:  [98.7  F (37.1  C)-98.9  F (37.2  C)] 98.9  F (37.2  C)  Heart Rate:  [75-85] 81  Resp:  [18-28] 20  BP: ()/(52-73) 96/52  FiO2 (%):  [24 %-35 %] 35 % 97% O2 Device: Trach mask O2 Flow Rate (L/min): 30 L/min  Weight:   140 lb 11.2 oz (63.8 kg) Weight change:     Intake/Output last 3 shifts  I/O last 3 completed shifts:  In: 2623 [NG/GT:2623]  Out: 2650 [Urine:2650]  Body mass index is 19.62 kg/m .    Physical Exam    General:     Patient comfortable, NAD. Some spontaneous movement of     right arm. Able to cough up some secretions.   HEENT:     No scleral icterus or conjunctival injection.   Heart:    RRR, S1 S2 w/o murmurs.   Lungs:     Breath sounds present. No crackles/wheezes heard.   Abdomen:   Soft.      Pertinent Labs   Lab Results: personally reviewed.   Results from last 7 days   Lab Units 06/24/21  0530 06/23/21  0543 06/22/21  0608   LN-SODIUM mmol/L 140 140 139   LN-POTASSIUM mmol/L 4.3 4.1 4.3   LN-CHLORIDE mmol/L 103 102 102   LN-CO2 mmol/L 28 29 29   LN-BLOOD UREA NITROGEN mg/dL 34* 34* 34*   LN-CREATININE mg/dL 0.70 0.66* 0.66*   LN-CALCIUM mg/dL 10.0 9.9 9.6     Results from last 7 days   Lab Units 06/23/21  0543 06/21/21  0619 06/18/21  0637   LN-WHITE BLOOD CELL COUNT thou/uL 6.2 8.1 6.1   LN-HEMOGLOBIN g/dL 9.2* 9.1* 9.0*   LN-HEMATOCRIT % 28.7* 28.8* 28.2*   LN-PLATELET COUNT thou/uL 351 284 229   LN-NEUTROPHILS RELATIVE PERCENT % 72* 76* 75*   LN-MONOCYTES RELATIVE PERCENT % 8 7 8         Results from last 7 days    Lab Units 06/24/21  1210 06/24/21  0544 06/23/21  2330 06/23/21  1758 06/23/21  1135 06/23/21  0546 06/22/21  2319 06/22/21  1757 06/22/21  1223 06/22/21  1139   LN-POC GLUCOSE FINGERSTICK- HE mg/dL 146* 115 118 108 111 129 126 121 129 134       Medications  Scheduled Meds:    amino acids-protein hydrolys  1 packet Enteral Tube DAILY     famotidine  20 mg Enteral Tube BID     guar gum  1 packet Enteral Tube TID     heparin (PF) ANTICOAGULANT  5,000 Units Subcutaneous Q8H FIXED TIMES     insulin aspart (NovoLOG) injection   Subcutaneous Q6H FIXED TIMES     ipratropium-albuteroL  3 mL Nebulization Q6H - RT     Lactobacillus rhamnosus GG  1 capsule Enteral Tube Daily with brkfst     lisinopriL  20 mg Enteral Tube Daily     menthol-zinc oxide   Topical TID     Continuous Infusions:  PRN Meds:.acetaminophen **OR** acetaminophen, alteplase, dextrose 50 % (D50W), glucagon (human recombinant), hydrALAZINE, HYDROmorphone, naloxone **OR** naloxone **OR** naloxone **OR** naloxone, sodium chloride 0.9%, sodium chloride

## 2021-06-26 NOTE — PLAN OF CARE
Problem: Mechanical Ventilation  Goal: Patient will maintain patent airway  Outcome: Progressing  Goal: Tracheostomy will be managed safely  Outcome: Progressing  Goal: Respiratory status - ventilation  Description: Movement of air in and out of the lungs.    Liberate from ventilator  Outcome: Progressing   RT PROGRESS NOTE     DATA:     CURRENT SETTINGS: Vent setting RR 14,  l, peep 5             TRACH TYPE / SIZE:  Shiley # 6 place on 6-7-21             MODE:   PRVC/AC             FIO2:   40%     ACTION:             THERAPIES:   Duo-neb three times a day              SUCTION:                           FREQUENCY:   x5                        AMOUNT:   scant to small x5, and x1 for moderate pale yellow, pt has productive cough most of it oozes from stoma site                         CONSISTENCY:   thick                         COLOR:   clear white/ stoma site yellow oozing and after blue dye test light blue secretion             SPONTANEOUS COUGH EFFORT/STRENGTH OF EFFORT (not elicited by suctioning): Yes strong                               WEANING PHASE:   1                        WEAN MODE:    CPAP/PS days as tolerated                         WEAN TIME:   8 hrs total 2 hrs on PS 12 and 6 hrs on PS 10                        END WEAN REASON:   dis comfort and increased RR to 28     RESPONSE:             BS:   clear after cough              VITAL SIGNS:   SpO2 95-98%, RR 18-25             EMOTIONAL NEEDS / CONCERNS:                  RISK FOR SELF DECANNULATION:                          RISK DUE TO:                          INTERVENTION/S IN PLACE IS/ARE:         NOTE / PLAN:   Pt weaning on CPAP 5/ PS 10, 8 hrs tolerated well, -500 ml, RR 18-24, Ve 8-12 L, at the end of 8 hrs wean pt tired and increased RR to 28, BS clear after cough , secretion oozing from stoma, BDT done delay aspiration stoma sit faint to light blue. Plan continue current order and monitor.

## 2021-06-26 NOTE — PLAN OF CARE
Problem: Pain  Goal: Patient's pain/discomfort is manageable  Outcome: Progressing       Problem: Safety  Goal: Patient will be injury free during hospitalization  Outcome: Progressing     Problem: Daily Care  Goal: Daily care needs are met  Outcome: Progressing     Problem: Psychosocial Needs  Goal: Demonstrates ability to cope with hospitalization/illness  Outcome: Progressing     Problem: Knowledge Deficit  Goal: Patient/family/caregiver demonstrates understanding of disease process, treatment plan, medications, and discharge instructions  Outcome: Progressing     Problem: Potential for Compromised Skin Integrity  Goal: Skin integrity is maintained or improved  Outcome: Progressing  Vss, no sign of pain noted. Pt was restful until this hs when pt was a little restless. He became calm after some minutes following hs meds. Other cares and needs done accordingly. mFamily on ipad most of the shift.

## 2021-06-26 NOTE — PLAN OF CARE
Problem: Pain  Goal: Patient's pain/discomfort is manageable  Outcome: Progressing     Problem: Safety  Goal: Patient will be injury free during hospitalization  Outcome: Progressing     Problem: Daily Care  Goal: Daily care needs are met  Outcome: Progressing     Problem: Risk of Injury Due to Unsafe Behavior  Goal: Patient will remain safe while in restraint; physical/psychological needs will be met  Outcome: Progressing  Goal: Patient/Family will be able to communicate reason for restraint and steps for restraint application and removal  Outcome: Progressing     Problem: Knowledge Deficit  Goal: Patient/family/caregiver demonstrates understanding of disease process, treatment plan, medications, and discharge instructions  Outcome: Progressing     Problem: Urinary Incontinence  Goal: Perineal skin integrity is maintained or improved  Outcome: Progressing     Problem: Potential for Falls  Goal: Patient will remain free of falls  Outcome: Progressing   Patient easily awakened for cares; slept well in between cares.  No s/s of pain noted; calm with cares.  Blood sugar WDL.  Yellow, thick secretions noted around trach site; trach cares done.  Right palm red blanchable; will continue to monitor.

## 2021-06-26 NOTE — PROGRESS NOTES
Care progression meeting this morning with PT/OT RN care manager, MARY KATE and wife and daughter.  It was held in conference room.  MD and julianna spoke with family afterwards.     PT- Negin:  Patient sat at the end of bed on Saturday for 45 minutes.  Still pretty dependent. Patient was trying to mouth something.  Got into to chair.  Need to clear more.    OT- Dahiana:Little bit clearer on Sunday.  He can do some things not not consistence.  He still has muscle strength.      Both PT/Ot talked about combining treatment together.      Wife had many concerns about cares patient is receiving.  Manager will address her concerns.  She is also worried that he need to get his strength.  He still need to clear more.  Wife also had concerns about the next level of cares.  She hopes he can go to [facility] when discharge from [facility].  MARY KATE and RN care manager explained that it really depend on him weaning on when and where he will be be able to go.  Currently patient weaning phase stage 2.      MARY KATE will continue to follow with discharge plans.    MARY KATE Law [facility] 6/21/2021 3:21 PM

## 2021-06-27 LAB
ANION GAP SERPL CALCULATED.3IONS-SCNC: 10 MMOL/L (ref 5–18)
BUN SERPL-MCNC: 33 MG/DL (ref 8–22)
CALCIUM SERPL-MCNC: 10.3 MG/DL (ref 8.5–10.5)
CHLORIDE BLD-SCNC: 101 MMOL/L (ref 98–107)
CO2 SERPL-SCNC: 29 MMOL/L (ref 22–31)
CREAT SERPL-MCNC: 0.68 MG/DL (ref 0.7–1.3)
GFR SERPL CREATININE-BSD FRML MDRD: >60 ML/MIN/1.73M2
GLUCOSE BLD-MCNC: 122 MG/DL (ref 70–125)
GLUCOSE BLDC GLUCOMTR-MCNC: 104 MG/DL (ref 70–139)
GLUCOSE BLDC GLUCOMTR-MCNC: 113 MG/DL (ref 70–139)
GLUCOSE BLDC GLUCOMTR-MCNC: 126 MG/DL (ref 70–139)
GLUCOSE BLDC GLUCOMTR-MCNC: 126 MG/DL (ref 70–139)
POTASSIUM BLD-SCNC: 4.4 MMOL/L (ref 3.5–5)
SODIUM SERPL-SCNC: 140 MMOL/L (ref 136–145)

## 2021-06-27 PROCEDURE — 999N001212 HC REV CODE 9999 CHARGE CONVERSION OPNP

## 2021-06-27 PROCEDURE — 36415 COLL VENOUS BLD VENIPUNCTURE: CPT

## 2021-06-27 PROCEDURE — 80048 BASIC METABOLIC PNL TOTAL CA: CPT

## 2021-06-27 PROCEDURE — 94640 AIRWAY INHALATION TREATMENT: CPT

## 2021-06-27 PROCEDURE — 97110 THERAPEUTIC EXERCISES: CPT | Mod: GO

## 2021-06-27 PROCEDURE — 94003 VENT MGMT INPAT SUBQ DAY: CPT

## 2021-06-28 LAB
ANION GAP SERPL CALCULATED.3IONS-SCNC: 10 MMOL/L (ref 5–18)
BASOPHILS # BLD AUTO: 0.1 THOU/UL (ref 0–0.2)
BASOPHILS NFR BLD AUTO: 1 % (ref 0–2)
BUN SERPL-MCNC: 37 MG/DL (ref 8–22)
CALCIUM SERPL-MCNC: 10.3 MG/DL (ref 8.5–10.5)
CHLORIDE BLD-SCNC: 101 MMOL/L (ref 98–107)
CO2 SERPL-SCNC: 28 MMOL/L (ref 22–31)
CREAT SERPL-MCNC: 0.65 MG/DL (ref 0.7–1.3)
EOSINOPHIL # BLD AUTO: 0.2 THOU/UL (ref 0–0.4)
EOSINOPHIL NFR BLD AUTO: 2 % (ref 0–6)
ERYTHROCYTE [DISTWIDTH] IN BLOOD BY AUTOMATED COUNT: 14.6 % (ref 11–14.5)
GFR SERPL CREATININE-BSD FRML MDRD: >60 ML/MIN/1.73M2
GLUCOSE BLD-MCNC: 141 MG/DL (ref 70–125)
GLUCOSE BLDC GLUCOMTR-MCNC: 104 MG/DL (ref 70–139)
GLUCOSE BLDC GLUCOMTR-MCNC: 111 MG/DL (ref 70–139)
GLUCOSE BLDC GLUCOMTR-MCNC: 117 MG/DL (ref 70–139)
GLUCOSE BLDC GLUCOMTR-MCNC: 125 MG/DL (ref 70–139)
HCT VFR BLD AUTO: 29.8 % (ref 40–54)
HGB BLD-MCNC: 9.6 G/DL (ref 14–18)
IMM GRANULOCYTES # BLD: 0.1 THOU/UL
IMM GRANULOCYTES NFR BLD: 1 %
LYMPHOCYTES # BLD AUTO: 1.1 THOU/UL (ref 0.8–4.4)
LYMPHOCYTES NFR BLD AUTO: 15 % (ref 20–40)
MCH RBC QN AUTO: 31 PG (ref 27–34)
MCHC RBC AUTO-ENTMCNC: 32.2 G/DL (ref 32–36)
MCV RBC AUTO: 96 FL (ref 80–100)
MONOCYTES # BLD AUTO: 0.6 THOU/UL (ref 0–0.9)
MONOCYTES NFR BLD AUTO: 8 % (ref 2–10)
NEUTROPHILS # BLD AUTO: 5.3 THOU/UL (ref 2–7.7)
NEUTROPHILS NFR BLD AUTO: 73 % (ref 50–70)
PLATELET # BLD AUTO: 424 THOU/UL (ref 140–440)
PMV BLD AUTO: 9.9 FL (ref 8.5–12.5)
POTASSIUM BLD-SCNC: 4.4 MMOL/L (ref 3.5–5)
POTASSIUM BLD-SCNC: 4.7 MMOL/L (ref 3.5–5)
RBC # BLD AUTO: 3.1 MILL/UL (ref 4.4–6.2)
SODIUM SERPL-SCNC: 139 MMOL/L (ref 136–145)
WBC: 7.3 THOU/UL (ref 4–11)

## 2021-06-28 PROCEDURE — 85025 COMPLETE CBC W/AUTO DIFF WBC: CPT

## 2021-06-28 PROCEDURE — 97112 NEUROMUSCULAR REEDUCATION: CPT | Mod: GP

## 2021-06-28 PROCEDURE — 36415 COLL VENOUS BLD VENIPUNCTURE: CPT

## 2021-06-28 PROCEDURE — 94003 VENT MGMT INPAT SUBQ DAY: CPT

## 2021-06-28 PROCEDURE — 999N001212 HC REV CODE 9999 CHARGE CONVERSION OPNP: Mod: GN

## 2021-06-28 PROCEDURE — 97530 THERAPEUTIC ACTIVITIES: CPT | Mod: GP

## 2021-06-28 PROCEDURE — 84132 ASSAY OF SERUM POTASSIUM: CPT | Mod: 91

## 2021-06-28 PROCEDURE — 94640 AIRWAY INHALATION TREATMENT: CPT

## 2021-06-28 PROCEDURE — 92507 TX SP LANG VOICE COMM INDIV: CPT | Mod: GN

## 2021-06-28 PROCEDURE — 71045 X-RAY EXAM CHEST 1 VIEW: CPT

## 2021-06-28 PROCEDURE — 80048 BASIC METABOLIC PNL TOTAL CA: CPT

## 2021-06-28 ASSESSMENT — MIFFLIN-ST. JEOR
SCORE: 1419
SCORE: 1423.08

## 2021-06-29 ENCOUNTER — COMMUNICATION - HEALTHEAST (OUTPATIENT)
Dept: SCHEDULING | Facility: CLINIC | Age: 71
End: 2021-06-29

## 2021-06-29 LAB
GLUCOSE BLDC GLUCOMTR-MCNC: 105 MG/DL (ref 70–139)
GLUCOSE BLDC GLUCOMTR-MCNC: 125 MG/DL (ref 70–139)
GLUCOSE BLDC GLUCOMTR-MCNC: 126 MG/DL (ref 70–139)
GLUCOSE BLDC GLUCOMTR-MCNC: 142 MG/DL (ref 70–139)

## 2021-06-29 PROCEDURE — 999N001212 HC REV CODE 9999 CHARGE CONVERSION OPNP

## 2021-06-29 PROCEDURE — 94640 AIRWAY INHALATION TREATMENT: CPT

## 2021-06-29 PROCEDURE — 94003 VENT MGMT INPAT SUBQ DAY: CPT

## 2021-06-29 PROCEDURE — 97530 THERAPEUTIC ACTIVITIES: CPT | Mod: GP

## 2021-06-29 PROCEDURE — 97112 NEUROMUSCULAR REEDUCATION: CPT | Mod: GP

## 2021-06-29 ASSESSMENT — MIFFLIN-ST. JEOR: SCORE: 1416.28

## 2021-06-30 LAB
ANION GAP SERPL CALCULATED.3IONS-SCNC: 9 MMOL/L (ref 5–18)
BASOPHILS # BLD AUTO: 0 THOU/UL (ref 0–0.2)
BASOPHILS NFR BLD AUTO: 0 % (ref 0–2)
BUN SERPL-MCNC: 27 MG/DL (ref 8–22)
CALCIUM SERPL-MCNC: 10.4 MG/DL (ref 8.5–10.5)
CHLORIDE BLD-SCNC: 102 MMOL/L (ref 98–107)
CO2 SERPL-SCNC: 30 MMOL/L (ref 22–31)
CREAT SERPL-MCNC: 0.61 MG/DL (ref 0.7–1.3)
EOSINOPHIL # BLD AUTO: 0.2 THOU/UL (ref 0–0.4)
EOSINOPHIL NFR BLD AUTO: 2 % (ref 0–6)
ERYTHROCYTE [DISTWIDTH] IN BLOOD BY AUTOMATED COUNT: 14.5 % (ref 11–14.5)
GFR SERPL CREATININE-BSD FRML MDRD: >60 ML/MIN/1.73M2
GLUCOSE BLD-MCNC: 120 MG/DL (ref 70–125)
GLUCOSE BLDC GLUCOMTR-MCNC: 148 MG/DL (ref 70–139)
HCT VFR BLD AUTO: 29.8 % (ref 40–54)
HGB BLD-MCNC: 9.7 G/DL (ref 14–18)
IMM GRANULOCYTES # BLD: 0 THOU/UL
IMM GRANULOCYTES NFR BLD: 0 %
LYMPHOCYTES # BLD AUTO: 1.2 THOU/UL (ref 0.8–4.4)
LYMPHOCYTES NFR BLD AUTO: 15 % (ref 20–40)
MCH RBC QN AUTO: 31 PG (ref 27–34)
MCHC RBC AUTO-ENTMCNC: 32.6 G/DL (ref 32–36)
MCV RBC AUTO: 95 FL (ref 80–100)
MONOCYTES # BLD AUTO: 0.6 THOU/UL (ref 0–0.9)
MONOCYTES NFR BLD AUTO: 8 % (ref 2–10)
NEUTROPHILS # BLD AUTO: 5.7 THOU/UL (ref 2–7.7)
NEUTROPHILS NFR BLD AUTO: 74 % (ref 50–70)
PLATELET # BLD AUTO: 399 THOU/UL (ref 140–440)
PMV BLD AUTO: 9.9 FL (ref 8.5–12.5)
POTASSIUM BLD-SCNC: 4.4 MMOL/L (ref 3.5–5)
RBC # BLD AUTO: 3.13 MILL/UL (ref 4.4–6.2)
SODIUM SERPL-SCNC: 141 MMOL/L (ref 136–145)
WBC: 7.7 THOU/UL (ref 4–11)

## 2021-06-30 PROCEDURE — 97110 THERAPEUTIC EXERCISES: CPT | Mod: GO

## 2021-06-30 PROCEDURE — 85025 COMPLETE CBC W/AUTO DIFF WBC: CPT

## 2021-06-30 PROCEDURE — 999N001212 HC REV CODE 9999 CHARGE CONVERSION OPNP

## 2021-06-30 PROCEDURE — 36415 COLL VENOUS BLD VENIPUNCTURE: CPT

## 2021-06-30 PROCEDURE — 94640 AIRWAY INHALATION TREATMENT: CPT

## 2021-06-30 PROCEDURE — 94003 VENT MGMT INPAT SUBQ DAY: CPT

## 2021-06-30 PROCEDURE — 80048 BASIC METABOLIC PNL TOTAL CA: CPT

## 2021-06-30 PROCEDURE — 92507 TX SP LANG VOICE COMM INDIV: CPT | Mod: GN

## 2021-06-30 PROCEDURE — 97112 NEUROMUSCULAR REEDUCATION: CPT | Mod: GP

## 2021-06-30 PROCEDURE — 97530 THERAPEUTIC ACTIVITIES: CPT | Mod: GP

## 2021-06-30 ASSESSMENT — MIFFLIN-ST. JEOR: SCORE: 1419

## 2021-07-01 LAB
ALBUMIN UR-MCNC: NEGATIVE G/DL
APPEARANCE UR: CLEAR
BILIRUB UR QL STRIP: NEGATIVE
COLOR UR AUTO: NORMAL
GLUCOSE UR STRIP-MCNC: NEGATIVE MG/DL
HGB UR QL STRIP: NEGATIVE
KETONES UR STRIP-MCNC: NEGATIVE MG/DL
LEUKOCYTE ESTERASE UR QL STRIP: NEGATIVE
NITRATE UR QL: NEGATIVE
PH UR STRIP: 7.5 [PH] (ref 5–8)
SP GR UR STRIP: 1.01 (ref 1–1.03)
UROBILINOGEN UR STRIP-ACNC: NORMAL

## 2021-07-01 PROCEDURE — 999N001212 HC REV CODE 9999 CHARGE CONVERSION OPNP: Mod: GP

## 2021-07-01 PROCEDURE — 97110 THERAPEUTIC EXERCISES: CPT | Mod: GO

## 2021-07-01 PROCEDURE — 81003 URINALYSIS AUTO W/O SCOPE: CPT

## 2021-07-01 PROCEDURE — 70450 CT HEAD/BRAIN W/O DYE: CPT

## 2021-07-01 PROCEDURE — 94640 AIRWAY INHALATION TREATMENT: CPT

## 2021-07-01 PROCEDURE — 97112 NEUROMUSCULAR REEDUCATION: CPT | Mod: GP

## 2021-07-01 PROCEDURE — 94003 VENT MGMT INPAT SUBQ DAY: CPT

## 2021-07-01 PROCEDURE — 97535 SELF CARE MNGMENT TRAINING: CPT | Mod: GO

## 2021-07-01 ASSESSMENT — MIFFLIN-ST. JEOR: SCORE: 1428.98

## 2021-07-02 LAB
ANION GAP SERPL CALCULATED.3IONS-SCNC: 11 MMOL/L (ref 5–18)
BASOPHILS # BLD AUTO: 0.1 THOU/UL (ref 0–0.2)
BASOPHILS NFR BLD AUTO: 1 % (ref 0–2)
BUN SERPL-MCNC: 27 MG/DL (ref 8–22)
CALCIUM SERPL-MCNC: 10.3 MG/DL (ref 8.5–10.5)
CHLORIDE BLD-SCNC: 100 MMOL/L (ref 98–107)
CO2 SERPL-SCNC: 26 MMOL/L (ref 22–31)
CREAT SERPL-MCNC: 0.63 MG/DL (ref 0.7–1.3)
EOSINOPHIL # BLD AUTO: 0.3 THOU/UL (ref 0–0.4)
EOSINOPHIL NFR BLD AUTO: 3 % (ref 0–6)
ERYTHROCYTE [DISTWIDTH] IN BLOOD BY AUTOMATED COUNT: 14.3 % (ref 11–14.5)
GFR SERPL CREATININE-BSD FRML MDRD: >60 ML/MIN/1.73M2
GLUCOSE BLD-MCNC: 142 MG/DL (ref 70–125)
GLUCOSE BLDC GLUCOMTR-MCNC: 116 MG/DL (ref 70–139)
HCT VFR BLD AUTO: 32.6 % (ref 40–54)
HGB BLD-MCNC: 10.4 G/DL (ref 14–18)
IMM GRANULOCYTES # BLD: 0 THOU/UL
IMM GRANULOCYTES NFR BLD: 1 %
LYMPHOCYTES # BLD AUTO: 1.1 THOU/UL (ref 0.8–4.4)
LYMPHOCYTES NFR BLD AUTO: 13 % (ref 20–40)
MCH RBC QN AUTO: 30.4 PG (ref 27–34)
MCHC RBC AUTO-ENTMCNC: 31.9 G/DL (ref 32–36)
MCV RBC AUTO: 95 FL (ref 80–100)
MONOCYTES # BLD AUTO: 0.7 THOU/UL (ref 0–0.9)
MONOCYTES NFR BLD AUTO: 8 % (ref 2–10)
NEUTROPHILS # BLD AUTO: 6.2 THOU/UL (ref 2–7.7)
NEUTROPHILS NFR BLD AUTO: 74 % (ref 50–70)
PLATELET # BLD AUTO: 386 THOU/UL (ref 140–440)
PMV BLD AUTO: 10.4 FL (ref 8.5–12.5)
POTASSIUM BLD-SCNC: 4.3 MMOL/L (ref 3.5–5)
RBC # BLD AUTO: 3.42 MILL/UL (ref 4.4–6.2)
SODIUM SERPL-SCNC: 137 MMOL/L (ref 136–145)
WBC: 8.3 THOU/UL (ref 4–11)

## 2021-07-02 PROCEDURE — 94003 VENT MGMT INPAT SUBQ DAY: CPT

## 2021-07-02 PROCEDURE — 94640 AIRWAY INHALATION TREATMENT: CPT

## 2021-07-02 PROCEDURE — 999N001212 HC REV CODE 9999 CHARGE CONVERSION OPNP: Mod: GO

## 2021-07-02 PROCEDURE — 97110 THERAPEUTIC EXERCISES: CPT | Mod: GP

## 2021-07-02 PROCEDURE — 85025 COMPLETE CBC W/AUTO DIFF WBC: CPT

## 2021-07-02 PROCEDURE — 92507 TX SP LANG VOICE COMM INDIV: CPT | Mod: GN

## 2021-07-02 PROCEDURE — 80048 BASIC METABOLIC PNL TOTAL CA: CPT

## 2021-07-02 PROCEDURE — 97535 SELF CARE MNGMENT TRAINING: CPT | Mod: GO

## 2021-07-02 PROCEDURE — 97530 THERAPEUTIC ACTIVITIES: CPT | Mod: GO

## 2021-07-02 PROCEDURE — 36415 COLL VENOUS BLD VENIPUNCTURE: CPT

## 2021-07-02 ASSESSMENT — MIFFLIN-ST. JEOR: SCORE: 1438.96

## 2021-07-03 LAB
GLUCOSE BLDC GLUCOMTR-MCNC: 115 MG/DL (ref 70–139)
GLUCOSE BLDC GLUCOMTR-MCNC: 134 MG/DL (ref 70–139)
SARS-COV-2 PCR RESULT-HE - HISTORICAL: NEGATIVE

## 2021-07-03 PROCEDURE — 999N001212 HC REV CODE 9999 CHARGE CONVERSION OPNP: Mod: GO

## 2021-07-03 PROCEDURE — U0002 COVID-19 LAB TEST NON-CDC: HCPCS

## 2021-07-03 PROCEDURE — 94003 VENT MGMT INPAT SUBQ DAY: CPT

## 2021-07-03 PROCEDURE — 94640 AIRWAY INHALATION TREATMENT: CPT

## 2021-07-03 PROCEDURE — 97535 SELF CARE MNGMENT TRAINING: CPT | Mod: GO

## 2021-07-03 ASSESSMENT — MIFFLIN-ST. JEOR: SCORE: 1424.61

## 2021-07-04 ENCOUNTER — COMMUNICATION - HEALTHEAST (OUTPATIENT)
Dept: SCHEDULING | Facility: CLINIC | Age: 71
End: 2021-07-04

## 2021-07-04 LAB
GLUCOSE BLDC GLUCOMTR-MCNC: 119 MG/DL (ref 70–139)
GLUCOSE BLDC GLUCOMTR-MCNC: 134 MG/DL (ref 70–139)

## 2021-07-04 PROCEDURE — 999N001212 HC REV CODE 9999 CHARGE CONVERSION OPNP

## 2021-07-04 PROCEDURE — 94640 AIRWAY INHALATION TREATMENT: CPT

## 2021-07-04 PROCEDURE — 94003 VENT MGMT INPAT SUBQ DAY: CPT

## 2021-07-04 ASSESSMENT — MIFFLIN-ST. JEOR: SCORE: 1443.95

## 2021-07-04 NOTE — PLAN OF CARE
Plan of Care by Gail Palmer RN at 6/30/2021  7:11 AM     Author: Gail Palmer RN Service: -- Author Type: Registered Nurse    Filed: 6/30/2021  7:13 AM Date of Service: 6/30/2021  7:11 AM Status: Signed    : Gail Palmer RN (Registered Nurse)         Problem: Pain  Goal: Patient's pain/discomfort is manageable  Outcome: Progressing     Problem: Safety  Goal: Patient will be injury free during hospitalization  Outcome: Progressing     Problem: Daily Care  Goal: Daily care needs are met  Outcome: Progressing    Patient is stable, all cares done per order and  no sign of pain noted .

## 2021-07-04 NOTE — PLAN OF CARE
Plan of Care by Negin Merchant PT at 7/1/2021  7:54 AM     Author: Negin Merchant PT Service: -- Author Type: Physical Therapist    Filed: 7/1/2021  7:54 AM Date of Service: 7/1/2021  7:54 AM Status: Signed    : Negin Merchant PT (Physical Therapist)         Problem: Physical Therapy  Goal: PT Goals  Description: Patient will demonstrate the following by 7-30-21, in order to maximize independence with functional mobility to facilitate safe discharge:   -Supine<>sit with head of bed flat, with  rail, mod assist  -Sit<>stand with FWW assistive device, Mod assist of one  -Sit on EOB with SBA  -Ambulate 10 feet with FWW assistive device, Mod assist of one       Goals entered on 6/14/2021 by Dayron Page, PT   Goals reviewed and revised on 7/1/2021 by Negin Merchant, PT

## 2021-07-04 NOTE — PLAN OF CARE
Plan of Care by Kayla Small LRT at 7/1/2021 11:16 PM     Author: Kayla Small LRT Service: -- Author Type: Respiratory Therapist    Filed: 7/2/2021  4:18 AM Date of Service: 7/1/2021 11:16 PM Status: Addendum    : Kayla Small LRT (Respiratory Therapist)    Related Notes: Original Note by Kayla Small LRT (Respiratory Therapist) filed at 7/1/2021 11:38 PM       RT PROGRESS NOTE    DATA  CURRENT SETTINGS:  Vent Mode: PRVC A/C  FiO2 (%):  [40 %] 40 %  S RR:  [14] 14  S VT:  [450 mL] 450 mL  PEEP/CPAP (cm H2O):  [5 cm H2O] 5 cm H2O  Minute Ventilation (L/min):  [8 L/min-14.2 L/min] 14.2 L/min  PIP:  [14 cm H2O-18 cm H2O] 17 cm H2O  MAP (cm H2O):  [7-8] 8      AIRWAY: Shiley #6 Cuffed 6/7/2021    ACTION  THERAPIES: Q6 Duoneb; Q8 Tobramycin; two times a day NaCl 3%  SUCTION:    Frequency - 2 to 3   Amount - Copious   Consistency - Thick   Color - Yellow/White  Spontaneous Cough/Effort: Strong/Productive    WEANING:    Weaning Phase: 2   Wean Mode: HFTM/PMV    Wean Time: 7/1/21 0823 to 1952 HFTD 10 LPM 40% (18.82 hours)   END WEAN REASON: Full Support at Night per Order    RESPONSE  BS: Coarse  VITAL SIGNS:   Temp:  [97.9  F (36.6  C)-98.6  F (37  C)] 98.2  F (36.8  C)  Heart Rate:  [79-87] 85  Resp:  [18-25] 18  BP: (106-116)/(66-75) 106/66  SpO2:  [92 %-99 %] 97 %  FiO2 (%):  [40 %] 40 %  EMOTIONAL CONCERNS: None  RISK FOR SELF DECANNULATION: Yes -- Mitt Restraints in Place     NOTE  Patient disconnected the ventilator from his trach twice throughout my shift without incident, appears to be by accident but he can inadvertently do it with his mitts on. Currently on full support overnight per the vent weaning order and will return to high flow trach dome in the morning.      Kayla Small    Problem: Mechanical Ventilation  Goal: Patient will maintain patent airway  Outcome: Progressing  Goal: Tracheostomy will be managed safely  Outcome: Progressing  Goal: Respiratory status -  ventilation  Description: Movement of air in and out of the lungs.    Liberate from ventilator  Outcome: Progressing     Problem: Chronic Tracheostomy  Goal: Patient is able to maintain stable respiratory status with long term  tracheostomy  Outcome: Progressing

## 2021-07-04 NOTE — PROGRESS NOTES
Progress Notes by Trino Sandoval MBBS at 7/3/2021  9:24 AM     Author: Trino Sandoval MBBS Service: Hospitalist Author Type: Physician    Filed: 7/3/2021  9:24 AM Date of Service: 7/3/2021  9:24 AM Status: Signed    : Trino Sandoval MBBS (Physician)       PROVIDER RESTRAINT FOR NON-VIOLENT BEHAVIOR FACE TO FACE EVALUATION    Patient's Immediate Situation:  Patient demonstrated the following behaviors: Pulling/tugging at invasive lines or tubes and does not respond to verbal/non-verbal redirection    Patient's Reaction to the intervention:  Does patient understand the reason for restraint/seclusion? No    Medical Condition:  Is there any evidence of compromise of Skin integrity, Respiratory, Cardiovascular, Musculoskeletal, Hydration? No    Behavioral Condition:  In consultation with the RN, is there a need to continue this restraint or seclusion? Yes   Still trying to touch trach site  Continue mittens    See Restraint Flowsheet for complete restraint documentation and assessment.    IAN Castaneda

## 2021-07-04 NOTE — PROGRESS NOTES
Progress Notes by Trino Sandoval MBBS at 7/3/2021  9:23 AM     Author: Trino Sandoval MBBS Service: Hospitalist Author Type: Physician    Filed: 7/3/2021 10:19 AM Date of Service: 7/3/2021  9:23 AM Status: Signed    : Trino Sandoval MBBS (Physician)       [facility] Daily Progress Note      Date of Service: 7/3/2021     Assessment and plan    Increase tracheal secretion  : Ongoing for weeks  ; Pulmonary on board  ; Sputum culture growing Pseudomonas  : Started on tobramycin from 6/26/2021  : Appreciate pulmonary input  ; Secretions getting better as per respiratory therapist      Acute metabolic encephalopathy  Considered multifactorial  ; Needing restraints  ; CT head on 6/14 show some possible increase in ventricular size however repeat CT on 6/16 did not show any acute changes  ; Was seen by neurology 6/18  ; Mental status remained same: Awake tracking  ;? Candidate for stimulant  : Difficult to assess clinically: Although patient appears slightly more responsive today  ; CT head did not show any acute changes  ; Discussed with neurology on 7/1/2021: Planning for repeat EEG  ; Neurology felt he should be okay to use Ritalin,   : Awaiting input from primary neurosurgeon: May need to call again after the weekend  ; Mental status appears to be finally improving  ; More communicative today      Recent subarachnoid hemorrhage:   Presented with fall and unresponsiveness on 5/15/2021.  S/p EVD, angiogram showing ruptured posterior communicating aneurysm followed by craniotomy with clipping of PCOM aneurysm on the same day.  EVD removed on 6/9/2021.    Postoperative period was complicated by vasospasm, bilateral BAYLEE infarct. MRI done on 5/28 showed right frontal parietal, temopral and left parasagittal parietal lobes infarcts.    On 6/1/2021, angiogram showed no vasospasm.   Will need to follow up with Dr. Martinez in Neurosurgery clinic in 3 months with repeat CTA of head and neck for post-operative  follow up    Hypernatremia: due to dehydration.  : Resolved  ; Sodium 143-141 139  ; Sodium 139  on 6/28/2021    Leukocytosis  Resolved     Acute hypoxic respiratory failure:   Initially postoperatively but later complicated by pneumonia.  On mechanical ventilation, tracheostomy done on 6/7/2021.    Vent management per RT and pulmonology.     Hospital acquired pneumonia: Sputum cultures from prior hospitalization showed Klebsiella, Pseudomonas.  Patient has been on antibiotics since 5/20/2021.  On 6/5/2021, was sputum grew ESBL for which patient is on meropenem, until 6/19/2021.    Left inguinal hernia  ; Monitor for signs of any obstruction  ; Outpatient evaluation       Oropharyngeal dysphagia:   On tube feeding via PEG tube, dietitian, speech consult. Video swallow 6/14     Hypertension: On lisinopril     Moderate malnutrition     Hypokalemia - likely secondary to GI loss - resolved.  -On K protocol, RN to manage         This note was dictated using voice recognition software. Any grammatical or context distortions are unintentional and inherent to the software.  Subjective:   Patient seen at bedside with nurse and respiratory therapist, wife on iPad    He appears more awake alert today, was able to say his name  Denies any trouble breathing  Following simple commands    He said good morning to his wife    As per nursing staff patient  to touch trach      Brief History: 70 y.o. old male with past medical history of hypertension who presented to ED on 5/15/2021 for altered mental status. He had a fall and was found unresponsive by his wife. He was found to have acute subarachnoid hemorrhage.  On 5/15, he underwent left external ventricular drain placement.  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 One the same day, he also underwent craniotomy with clipping of PCOM aneurysm.  On 5/28/2021, patient had persistently elevated TCD, CT head showed bilateral BAYLEE infarct.  Next day, for  persistent TCD and CTA showing bilateral BAYLEE vasospasm, was treated with milrinone and verapamil.  On 6/1/2021, angiogram showed no evidence of vasospasm.  He was extubated on 6/3/2021.On 6/6/2021, patient was reintubated due to drop in his saturation.  On 6/7/2021, patient underwent tracheostomy and PEG tube placement.  On 6/9/2021, EVD was removed.  Patient was initially started on Unasyn for possible pneumonia on 5/20/2021 which was switched to ceftriaxone the next day for sputum culture growing Klebsiella.  On 5/23, sputum culture also grew Pseudomonas for which ceftriaxone was switched to Zosyn.  Due to increased white blood cell counts, possibility of ventriculitis was raised however CSF was negative but antibiotic was switched to cefepime and vancomycin on 5/28/2021 with plan to continue for 2 weeks.  Vanco has been discontinued on 6/9/2021.  On 6/5/2021, sputum grew ESBL so cefepime was switched to meropenem which he is on currently.   He was transferred to [facility] on 6/11/21.     6/14/21 CT head done for fatigue- read as slight increase in caliber of lateral and third ventricle  With possibility of developing communicating hydrocephalus. Discussed with Dr. Casillas (neurosurgeon at [facility]), who did not think there was much change, however suggested repeating CT head on 6/16/21 which did not show any change.    Started on tobramycin inhaler from 6/26/2021    Chart reviewed, events noted. Pt seen and examined.     Objective     Vital signs in last 24 hours:  Temp:  [97.4  F (36.3  C)-99.2  F (37.3  C)] 99.2  F (37.3  C)  Heart Rate:  [75-91] 77  Resp:  [16-26] 21  BP: (110-126)/(73-81) 120/76  FiO2 (%):  [30 %-40 %] 30 %  Weight:   139 lb 7 oz (63.2 kg)  Weight change: -3 lb 2.6 oz (-1.435 kg)  Body mass index is 19.45 kg/m .    Intake/Output last 3 shifts:  I/O last 3 completed shifts:  In: 4900 [NG/GT:4900]  Out: 1650 [Urine:1650]  Intake/Output this shift:  I/O this shift:  In: 360 [NG/GT:360]  Out: -  "      Physical Exam:  /76 (Patient Position: Lying)   Pulse 77   Temp 99.2  F (37.3  C) (Oral)   Resp 21   Ht 5' 11\" (1.803 m)   Wt 139 lb 7 oz (63.2 kg)   SpO2 96%   BMI 19.45 kg/m      FiO2 (%): 30 % O2 Device: Ventilator O2 Flow Rate (L/min): 30 L/min    General Appearance:   More awake alert   HEENT:    Normocephalic. Pupils equal and reactive. No scleral   Icterus.  Healed surgical scar on scalp   Lungs:     Clear to auscultation bilaterally, respirations unlabored  No audible wheeze crackle or conducted sounds   Heart::    Regular rate and rhythm, S1 and S2 normal, no murmur, rub   or gallop. No peripheral edema.   Abdomen:   Soft, Non tender. Non distended. Bowel sounds present.  G-tube in place   Extremity :  Left inguinal hernia   Pulses:   2+ and symmetric all extremities   CNS:  Awake, tracks eyes  No clear facial asymmetry  Able to move upper limbs  Was able to wiggle right toe           Scheduled Meds:  ? famotidine  20 mg Enteral Tube BID   ? guar gum  1 packet Enteral Tube TID   ? heparin (PF) ANTICOAGULANT  5,000 Units Subcutaneous Q8H FIXED TIMES   ? ipratropium-albuteroL  3 mL Nebulization Q6H - RT   ? [Held by provider] lisinopriL  20 mg Enteral Tube Daily   ? menthol-zinc oxide   Topical TID   ? sodium chloride  3 mL Nebulization BID   ? tobramycin  80 mg Nebulization Q8H - RT     Continuous Infusions:  PRN Meds:.acetaminophen **OR** acetaminophen, alteplase, bisacodyL, dextrose 50 % (D50W), docusate sodium, glucagon (human recombinant), hydrALAZINE, magnesium hydroxide, naloxone **OR** naloxone **OR** naloxone **OR** naloxone, oxyCODONE, polyethylene glycol, sodium chloride 0.9%, sodium chloride    Lab Results:  personally reviewed.   not applicable  Recent Results (from the past 24 hour(s))   Basic Metabolic Panel    Collection Time: 07/02/21  3:19 PM   Result Value Ref Range    Sodium 137 136 - 145 mmol/L    Potassium 4.3 3.5 - 5.0 mmol/L    Chloride 100 98 - 107 mmol/L    CO2 26 " 22 - 31 mmol/L    Anion Gap, Calculation 11 5 - 18 mmol/L    Glucose 142 (H) 70 - 125 mg/dL    Calcium 10.3 8.5 - 10.5 mg/dL    BUN 27 (H) 8 - 22 mg/dL    Creatinine 0.63 (L) 0.70 - 1.30 mg/dL    GFR MDRD Af Amer >60 >60 mL/min/1.73m2    GFR MDRD Non Af Amer >60 >60 mL/min/1.73m2   POCT Glucose    Collection Time: 07/02/21 11:46 PM    Specimen: Blood   Result Value Ref Range    Glucose 116 70 - 139 mg/dL     Results from last 7 days   Lab Units 07/02/21  2346 06/30/21  0534 06/29/21  2351 06/29/21  1707 06/29/21  1234 06/29/21  0526 06/28/21  2341 06/28/21  1741 06/28/21  1156 06/28/21  0527   LN-POC GLUCOSE FINGERSTICK- HE mg/dL 116 148* 125 105 126 142* 111 104 125 117           Advance Care Planning:   Barriers to discharge: Multiple issues  Anticipated discharge day: Few days  Disposition: Depending on PT OT      Lori Jameson MD  [facility]  Hospitalist

## 2021-07-04 NOTE — PROGRESS NOTES
Progress Notes by Kayla Small LRT at 2021  3:06 AM     Author: Kayla Small LRT Service: -- Author Type: Respiratory Therapist    Filed: 2021  3:43 AM Date of Service: 2021  3:06 AM Status: Addendum    : Kayla Small LRT (Respiratory Therapist)    Related Notes: Original Note by Kayla Small LRT (Respiratory Therapist) filed at 2021  3:40 AM       RT PROGRESS NOTE    DATA  CURRENT SETTINGS:  Vent Mode: PRVC A/C  FiO2 (%):  [40 %] 40 %  S RR:  [14] 14  S VT:  [450 mL] 450 mL  PEEP/CPAP (cm H2O):  [5 cm H2O] 5 cm H2O  Minute Ventilation (L/min):  [8 L/min-14.2 L/min] 14.2 L/min  PIP:  [14 cm H2O-18 cm H2O] 17 cm H2O  MAP (cm H2O):  [7-8] 8      AIRWAY: Shiley #6 Cuffed 2021    ACTION  THERAPIES: Q6 Duoneb; Q8 Tobramycin; two times a day NaCl 3%  SUCTION: Suctioned several times for large to copious thick yellow/white secretions  WEANIN21 to  HFTD 10 LPM 40%    RESPONSE  BS: Coarse  VITAL SIGNS:   Temp:  [97.9  F (36.6  C)-98.6  F (37  C)] 98.2  F (36.8  C)  Heart Rate:  [79-87] 85  Resp:  [18-25] 18  BP: (106-116)/(66-75) 106/66  FiO2 (%):  [40 %] 40 %  EMOTIONAL CONCERNS: None  RISK FOR SELF DECANNULATION: Yes -- Mitt Restraints in Place     NOTE  Patient disconnected the ventilator from his trach twice throughout my shift without incident, appears to be by accident but he can inadvertently do it with his mitts on. Currently on full support overnight per the vent weaning order and will return to high flow trach dome in the morning.      Kayla Small

## 2021-07-04 NOTE — PROGRESS NOTES
Progress Notes by Trino Sandoval MBBS at 7/1/2021  9:02 AM     Author: Trino Sandoval MBBS Service: Hospitalist Author Type: Physician    Filed: 7/1/2021  1:22 PM Date of Service: 7/1/2021  9:02 AM Status: Addendum    : Trino Sandoval MBBS (Physician)    Related Notes: Original Note by Trino Sandoval MBBS (Physician) filed at 7/1/2021 11:01 AM       [place] Daily Progress Note      Date of Service: 7/1/2021     Assessment and plan    Increase tracheal secretion  : Ongoing for weeks  ; Pulmonary on board  ; Sputum culture growing Pseudomonas  : Started on tobramycin from 6/26/2021  ; No leukocytosis  ; Repeat chest x-ray today did not show any infiltrate  : Pulmonary note reviewed: Bronchoscopy being considered  : Pulmonary follow-up      Acute metabolic encephalopathy  Considered multifactorial  ; Needing restraints  ; CT head on 6/14 show some possible increase in ventricular size however repeat CT on 6/16 did not show any acute changes  ; Was seen by neurology 6/18  ; Mental status remained same: Awake tracking  ; No significant improvement  ;? Need for repeat imaging, will reconsult neurology to get their opinion  ;? Candidate for stimulant  : Difficult to assess clinically: Although patient appears slightly more responsive today  ; CT head did not show any acute changes  ; Discussed with neurology: Planning for repeat EEG  ; Spoke with neurology regarding starting Ritalin, will get input from neurosurgery too      Recent subarachnoid hemorrhage:   Presented with fall and unresponsiveness on 5/15/2021.  S/p EVD, angiogram showing ruptured posterior communicating aneurysm followed by craniotomy with clipping of PCOM aneurysm on the same day.  EVD removed on 6/9/2021.    Postoperative period was complicated by vasospasm, bilateral BAYLEE infarct. MRI done on 5/28 showed right frontal parietal, temopral and left parasagittal parietal lobes infarcts.    On 6/1/2021, angiogram showed no  vasospasm.   Will need to follow up with Dr. Martinez in Neurosurgery clinic in 3 months with repeat CTA of head and neck for post-operative follow up    Hypernatremia: due to dehydration.  : Resolved  ; Sodium 143-141 139  ; Sodium 139  on 6/28/2021    Leukocytosis  Resolved     Acute hypoxic respiratory failure:   Initially postoperatively but later complicated by pneumonia.  On mechanical ventilation, tracheostomy done on 6/7/2021.    Vent management per RT and pulmonology.     Hospital acquired pneumonia: Sputum cultures from prior hospitalization showed Klebsiella, Pseudomonas.  Patient has been on antibiotics since 5/20/2021.  On 6/5/2021, was sputum grew ESBL for which patient is on meropenem, until 6/19/2021.    Left inguinal hernia  ; Monitor for signs of any obstruction  ; Outpatient evaluation       Oropharyngeal dysphagia:   On tube feeding via PEG tube, dietitian, speech consult. Video swallow 6/14     Hypertension: On lisinopril     Moderate malnutrition     Hypokalemia - likely secondary to GI loss - resolved.  -On K protocol, RN to manage         This note was dictated using voice recognition software. Any grammatical or context distortions are unintentional and inherent to the software.  Subjective:   Patient seen at bedside  Wife on iPad    He appears awake, looks tired, still tracking  Did try to mouth some words        Brief History: 70 y.o. old male with past medical history of hypertension who presented to ED on 5/15/2021 for altered mental status. He had a fall and was found unresponsive by his wife. He was found to have acute subarachnoid hemorrhage.  On 5/15, he underwent left external ventricular drain placement.  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 One the same day, he also underwent craniotomy with clipping of PCOM aneurysm.  On 5/28/2021, patient had persistently elevated TCD, CT head showed bilateral BAYLEE infarct.  Next day, for persistent TCD and CTA showing  bilateral BAYLEE vasospasm, was treated with milrinone and verapamil.  On 6/1/2021, angiogram showed no evidence of vasospasm.  He was extubated on 6/3/2021.On 6/6/2021, patient was reintubated due to drop in his saturation.  On 6/7/2021, patient underwent tracheostomy and PEG tube placement.  On 6/9/2021, EVD was removed.  Patient was initially started on Unasyn for possible pneumonia on 5/20/2021 which was switched to ceftriaxone the next day for sputum culture growing Klebsiella.  On 5/23, sputum culture also grew Pseudomonas for which ceftriaxone was switched to Zosyn.  Due to increased white blood cell counts, possibility of ventriculitis was raised however CSF was negative but antibiotic was switched to cefepime and vancomycin on 5/28/2021 with plan to continue for 2 weeks.  Vanco has been discontinued on 6/9/2021.  On 6/5/2021, sputum grew ESBL so cefepime was switched to meropenem which he is on currently.   He was transferred to [facility] on 6/11/21.     6/14/21 CT head done for fatigue- read as slight increase in caliber of lateral and third ventricle  With possibility of developing communicating hydrocephalus. Discussed with Dr. Casillas (neurosurgeon at [facility]), who did not think there was much change, however suggested repeating CT head on 6/16/21 which did not show any change.    Started on tobramycin inhaler from 6/26/2021    Chart reviewed, events noted. Pt seen and examined.     Objective     Vital signs in last 24 hours:  Temp:  [97.6  F (36.4  C)-98.6  F (37  C)] 98.6  F (37  C)  Heart Rate:  [80-97] 85  Resp:  [17-28] 20  BP: (116-134)/(66-89) 116/66  FiO2 (%):  [40 %] 40 %  Weight:   140 lb 6.4 oz (63.7 kg)  Weight change: 2 lb 12.8 oz (1.27 kg)  Body mass index is 19.58 kg/m .    Intake/Output last 3 shifts:  I/O last 3 completed shifts:  In: 2155 [NG/GT:2155]  Out: 2200 [Urine:2200]  Intake/Output this shift:  No intake/output data recorded.      Physical Exam:  /66 (Patient Position: Lying)    "Pulse 85   Temp 98.6  F (37  C) (Axillary)   Resp 20   Ht 5' 11\" (1.803 m)   Wt 140 lb 6.4 oz (63.7 kg)   SpO2 96%   BMI 19.58 kg/m      FiO2 (%): 40 % O2 Device: Trach mask O2 Flow Rate (L/min): 10 L/min    General Appearance:   Lying in bed, eyes open, appears to be tracking   HEENT:    Normocephalic. Pupils equal and reactive. No scleral   Icterus.  Healed surgical scar on scalp   Lungs:     Clear to auscultation bilaterally, respirations unlabored   conducted sounds    Heart::    Regular rate and rhythm, S1 and S2 normal, no murmur, rub   or gallop. No peripheral edema.   Abdomen:   Soft, Non tender. Non distended. Bowel sounds present.  G-tube in place   Extremity :  Left inguinal hernia   Pulses:   2+ and symmetric all extremities   CNS:  Awake, tracks eyes  No clear facial asymmetry  Spontaneous upper limb movements  No clear movement of lower limbs noticed         Scheduled Meds:  ? famotidine  20 mg Enteral Tube BID   ? guar gum  1 packet Enteral Tube TID   ? heparin (PF) ANTICOAGULANT  5,000 Units Subcutaneous Q8H FIXED TIMES   ? ipratropium-albuteroL  3 mL Nebulization Q6H - RT   ? [Held by provider] lisinopriL  20 mg Enteral Tube Daily   ? menthol-zinc oxide   Topical TID   ? sodium chloride  3 mL Nebulization BID   ? tobramycin  80 mg Nebulization Q8H - RT     Continuous Infusions:  PRN Meds:.acetaminophen **OR** acetaminophen, alteplase, bisacodyL, dextrose 50 % (D50W), docusate sodium, glucagon (human recombinant), hydrALAZINE, HYDROmorphone, magnesium hydroxide, naloxone **OR** naloxone **OR** naloxone **OR** naloxone, polyethylene glycol, sodium chloride 0.9%, sodium chloride    Lab Results:  personally reviewed.   not applicable  Recent Results (from the past 24 hour(s))   Basic Metabolic Panel    Collection Time: 06/30/21  2:41 PM   Result Value Ref Range    Sodium 141 136 - 145 mmol/L    Potassium 4.4 3.5 - 5.0 mmol/L    Chloride 102 98 - 107 mmol/L    CO2 30 22 - 31 mmol/L    Anion Gap, " Calculation 9 5 - 18 mmol/L    Glucose 120 70 - 125 mg/dL    Calcium 10.4 8.5 - 10.5 mg/dL    BUN 27 (H) 8 - 22 mg/dL    Creatinine 0.61 (L) 0.70 - 1.30 mg/dL    GFR MDRD Af Amer >60 >60 mL/min/1.73m2    GFR MDRD Non Af Amer >60 >60 mL/min/1.73m2     Results from last 7 days   Lab Units 06/30/21  0534 06/29/21  2351 06/29/21  1707 06/29/21  1234 06/29/21  0526 06/28/21  2341 06/28/21  1741 06/28/21  1156 06/28/21  0527 06/27/21  2349   LN-POC GLUCOSE FINGERSTICK- HE mg/dL 148* 125 105 126 142* 111 104 125 117 126           Advance Care Planning:   Barriers to discharge: Multiple issues  Anticipated discharge day: Few days  Disposition: Depending on PT OT      Lori Jameson MD  [facility]  Hospitalist

## 2021-07-04 NOTE — PLAN OF CARE
Plan of Care by Mariam Robles RN at 7/3/2021  3:42 PM     Author: Mariam Robles RN Service: -- Author Type: Registered Nurse    Filed: 7/3/2021  3:44 PM Date of Service: 7/3/2021  3:42 PM Status: Signed    : Mariam Robles RN (Registered Nurse)         Problem: Pain  Goal: Patient's pain/discomfort is manageable  Outcome: Progressing     Problem: Safety  Goal: Patient will be injury free during hospitalization  Outcome: Progressing     Problem: Daily Care  Goal: Daily care needs are met  Outcome: Progressing     Problem: Psychosocial Needs  Goal: Demonstrates ability to cope with hospitalization/illness  Outcome: Progressing     Problem: Potential for Compromised Skin Integrity  Goal: Skin integrity is maintained or improved  Outcome: Progressing     Problem: Urinary Incontinence  Goal: Perineal skin integrity is maintained or improved  Outcome: Progressing     Problem: Potential for Falls  Goal: Patient will remain free of falls  Outcome: Progressing     Pt alert. Vital signs stable. No nonverbal signs of pain.  Pt was particularly communicative today; spent time up in chair. Wife and daughter visited. Pt interacted with them and spoke in sentences in a soft voice.  All care needs met.

## 2021-07-04 NOTE — PLAN OF CARE
Plan of Care by Chito Orozco LRT at 7/1/2021  5:15 PM     Author: Chito Orozco LRT Service: Pulmonology Author Type: Respiratory Therapist    Filed: 7/1/2021  5:15 PM Date of Service: 7/1/2021  5:15 PM Status: Addendum    : Chito Orozco LRT (Respiratory Therapist)    Related Notes: Original Note by Chito Orozco LRT (Respiratory Therapist) filed at 6/30/2021  5:27 PM       Problem: Mechanical Ventilation  Goal: Patient will maintain patent airway  Outcome: Progressing  Goal: Tracheostomy will be managed safely  Outcome: Progressing  Goal: Respiratory status - ventilation  Description: Movement of air in and out of the lungs.    Liberate from ventilator  Outcome: Progressing  RT PROGRESS NOTE     DATA:     CURRENT SETTINGS:  14/450/+5             TRACH TYPE / SIZE: #6 Shiley DCT (Placed 06/07)             MODE:   PRVC             FIO2:   40%     ACTION:             THERAPIES: Duo-neb Q6/Saline BID/Barry Q8             SUCTION: Yes                         FREQUENCY: 5x                        AMOUNT:   Moderate x2 to Large x3                        CONSISTENCY: Thick                        COLOR:   Pale yellow             SPONTANEOUS COUGH EFFORT/STRENGTH OF EFFORT (not elicited by suctioning): Yes, strong cough.                              WEANING PHASE:   2                        WEAN MODE:40  HFTM/pmv                        WEAN TIME:  since 0823                        END WEAN REASON: ongoing      RESPONSE:             BS:   Coarse             VITAL SIGNS: Sat 92-98%, HR 74-84, RR 17-26             EMOTIONAL NEEDS / CONCERNS:  None                 RISK FOR SELF DECANNULATION:  Yes                        RISK DUE TO:  Confusion                        INTERVENTION/S IN PLACE IS/ARE: Restraints x2       NOTE / PLAN:     Pt remains on 40%hftm with pmv during the day,   Continue current care plan.   vent at night

## 2021-07-04 NOTE — PROGRESS NOTES
Progress Notes by Trino Sandoval MBBS at 7/2/2021  9:02 AM     Author: Trino Sandoval MBBS Service: Hospitalist Author Type: Physician    Filed: 7/2/2021  9:02 AM Date of Service: 7/2/2021  9:02 AM Status: Signed    : Trino Sandoval MBBS (Physician)       PROVIDER RESTRAINT FOR NON-VIOLENT BEHAVIOR FACE TO FACE EVALUATION    Patient's Immediate Situation:  Patient demonstrated the following behaviors: No order    Patient's Reaction to the intervention:  Does patient understand the reason for restraint/seclusion? No    Medical Condition:  Is there any evidence of compromise of Skin integrity, Respiratory, Cardiovascular, Musculoskeletal, Hydration? No    Behavioral Condition:  In consultation with the RN, is there a need to continue this restraint or seclusion? Yes   Still trying to touch trach site  Continue mittens    See Restraint Flowsheet for complete restraint documentation and assessment.    IAN Castaneda

## 2021-07-04 NOTE — PROGRESS NOTES
Progress Notes by Trino Sandoval MBBS at 7/1/2021  9:02 AM     Author: Trino Sandoval MBBS Service: Hospitalist Author Type: Physician    Filed: 7/1/2021  9:02 AM Date of Service: 7/1/2021  9:02 AM Status: Signed    : Trino Sandoval MBBS (Physician)       PROVIDER RESTRAINT FOR NON-VIOLENT BEHAVIOR FACE TO FACE EVALUATION    Patient's Immediate Situation:  Patient demonstrated the following behaviors: Pulling/tugging at invasive lines or tubes and does not respond to verbal/non-verbal redirection    Patient's Reaction to the intervention:  Does patient understand the reason for restraint/seclusion? No    Medical Condition:  Is there any evidence of compromise of Skin integrity, Respiratory, Cardiovascular, Musculoskeletal, Hydration? No    Behavioral Condition:  In consultation with the RN, is there a need to continue this restraint or seclusion? Yes   Continue mittens    See Restraint Flowsheet for complete restraint documentation and assessment.    IAN Castaneda

## 2021-07-04 NOTE — PLAN OF CARE
Plan of Care by Amanda Jean RN at 7/2/2021 10:29 PM     Author: Amanda Jean RN Service: -- Author Type: Registered Nurse    Filed: 7/2/2021 10:34 PM Date of Service: 7/2/2021 10:29 PM Status: Signed    : Amanda Jean RN (Registered Nurse)         Problem: Daily Care  Goal: Daily care needs are met  Outcome: Progressing     Problem: Psychosocial Needs  Goal: Demonstrates ability to cope with hospitalization/illness  Outcome: Progressing   Patient had a good shift, was wide a wake and able to mumble some words. Patient was calm and cooperative with all cares. Up on chair x 1. No sign of pain noted.

## 2021-07-04 NOTE — PROGRESS NOTES
Progress Notes by Trino Sandoval MBBS at 6/30/2021  9:11 AM     Author: Trino Sandoval MBBS Service: Hospitalist Author Type: Physician    Filed: 6/30/2021 11:08 AM Date of Service: 6/30/2021  9:11 AM Status: Signed    : Trino Sandoval MBBS (Physician)       [facility] Daily Progress Note      Date of Service: 6/30/2021     Assessment and plan    Increase tracheal secretion  : Ongoing for weeks  ; Pulmonary on board  ; Sputum culture growing Pseudomonas  : Started on tobramycin from 6/26/2021  ; No leukocytosis  ; Repeat chest x-ray today did not show any infiltrate  : Pulmonary note reviewed: Bronchoscopy being considered  : No new issues    Acute metabolic encephalopathy  Considered multifactorial  ; Needing restraints  ; CT head on 6/14 show some possible increase in ventricular size however repeat CT on 6/16 did not show any acute changes  ; Was seen by neurology 6/18  ; Mental status remained same: Awake tracking  ; No significant improvement  ;? Need for repeat imaging, will reconsult neurology to get their opinion  ;? Candidate for stimulant      Recent subarachnoid hemorrhage:   Presented with fall and unresponsiveness on 5/15/2021.  S/p EVD, angiogram showing ruptured posterior communicating aneurysm followed by craniotomy with clipping of PCOM aneurysm on the same day.  EVD removed on 6/9/2021.    Postoperative period was complicated by vasospasm, bilateral BAYLEE infarct. MRI done on 5/28 showed right frontal parietal, temopral and left parasagittal parietal lobes infarcts.    On 6/1/2021, angiogram showed no vasospasm.   Will need to follow up with Dr. Martinez in Neurosurgery clinic in 3 months with repeat CTA of head and neck for post-operative follow up    Hypernatremia: due to dehydration.  : Resolved  ; Sodium 143-141 139  ; Sodium 139  on 6/28/2021    Leukocytosis  Resolved     Acute hypoxic respiratory failure:   Initially postoperatively but later complicated by pneumonia.  On  mechanical ventilation, tracheostomy done on 6/7/2021.    Vent management per RT and pulmonology.     Hospital acquired pneumonia: Sputum cultures from prior hospitalization showed Klebsiella, Pseudomonas.  Patient has been on antibiotics since 5/20/2021.  On 6/5/2021, was sputum grew ESBL for which patient is on meropenem, until 6/19/2021.    Left inguinal hernia  ; Monitor for signs of any obstruction  ; Outpatient evaluation       Oropharyngeal dysphagia:   On tube feeding via PEG tube, dietitian, speech consult. Video swallow 6/14     Hypertension: On lisinopril     Moderate malnutrition     Hypokalemia - likely secondary to GI loss - resolved.  -On K protocol, RN to manage         This note was dictated using voice recognition software. Any grammatical or context distortions are unintentional and inherent to the software.  Subjective:     Patient seen at bedside with nursing staff  Patient's wife on iPad  He looks same as before, awake, eyes open and appears to be tracking, following simple commands squeezing hand          Brief History: 70 y.o. old male with past medical history of hypertension who presented to ED on 5/15/2021 for altered mental status. He had a fall and was found unresponsive by his wife. He was found to have acute subarachnoid hemorrhage.  On 5/15, he underwent left external ventricular drain placement.  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 One the same day, he also underwent craniotomy with clipping of PCOM aneurysm.  On 5/28/2021, patient had persistently elevated TCD, CT head showed bilateral BAYLEE infarct.  Next day, for persistent TCD and CTA showing bilateral BAYLEE vasospasm, was treated with milrinone and verapamil.  On 6/1/2021, angiogram showed no evidence of vasospasm.  He was extubated on 6/3/2021.On 6/6/2021, patient was reintubated due to drop in his saturation.  On 6/7/2021, patient underwent tracheostomy and PEG tube placement.  On 6/9/2021, EVD was  "removed.  Patient was initially started on Unasyn for possible pneumonia on 5/20/2021 which was switched to ceftriaxone the next day for sputum culture growing Klebsiella.  On 5/23, sputum culture also grew Pseudomonas for which ceftriaxone was switched to Zosyn.  Due to increased white blood cell counts, possibility of ventriculitis was raised however CSF was negative but antibiotic was switched to cefepime and vancomycin on 5/28/2021 with plan to continue for 2 weeks.  Vanco has been discontinued on 6/9/2021.  On 6/5/2021, sputum grew ESBL so cefepime was switched to meropenem which he is on currently.   He was transferred to [facility] on 6/11/21.     6/14/21 CT head done for fatigue- read as slight increase in caliber of lateral and third ventricle  With possibility of developing communicating hydrocephalus. Discussed with Dr. Casillas (neurosurgeon at [facility]), who did not think there was much change, however suggested repeating CT head on 6/16/21 which did not show any change.    Started on tobramycin inhaler from 6/26/2021    Chart reviewed, events noted. Pt seen and examined.     Objective     Vital signs in last 24 hours:  Temp:  [98  F (36.7  C)-99.4  F (37.4  C)] 98.3  F (36.8  C)  Heart Rate:  [74-84] 81  Resp:  [17-26] 22  BP: (103-128)/(57-74) 103/64  FiO2 (%):  [30 %] 30 %  Weight:   138 lb 3.2 oz (62.7 kg)  Weight change: -9.6 oz (-0.272 kg)  Body mass index is 19.27 kg/m .    Intake/Output last 3 shifts:  I/O last 3 completed shifts:  In: 2920 [NG/GT:2920]  Out: 3200 [Urine:3200]  Intake/Output this shift:  No intake/output data recorded.      Physical Exam:  /64 (Patient Position: Lying)   Pulse 81   Temp 98.3  F (36.8  C) (Oral)   Resp 22   Ht 5' 11\" (1.803 m)   Wt 138 lb 3.2 oz (62.7 kg)   SpO2 93%   BMI 19.27 kg/m      FiO2 (%): 30 % O2 Device: Trach mask O2 Flow Rate (L/min): 10 L/min    General Appearance:   Lying in bed, eyes open, appears to be tracking   HEENT:    Normocephalic. Pupils " equal and reactive. No scleral   Icterus.  Healed surgical scar on scalp   Lungs:     Clear to auscultation bilaterally, respirations unlabored   conducted sounds    Heart::    Regular rate and rhythm, S1 and S2 normal, no murmur, rub   or gallop. No peripheral edema.   Abdomen:   Soft, Non tender. Non distended. Bowel sounds present.  G-tube in place   Extremity :  Left inguinal hernia   Pulses:   2+ and symmetric all extremities   CNS:  Awake, tracks eyes  No clear facial asymmetry  Spontaneous upper limb movements  No clear movement of lower limbs noticed         Scheduled Meds:  ? famotidine  20 mg Enteral Tube BID   ? guar gum  1 packet Enteral Tube TID   ? heparin (PF) ANTICOAGULANT  5,000 Units Subcutaneous Q8H FIXED TIMES   ? ipratropium-albuteroL  3 mL Nebulization Q6H - RT   ? [Held by provider] lisinopriL  20 mg Enteral Tube Daily   ? menthol-zinc oxide   Topical TID   ? sodium chloride  3 mL Nebulization BID   ? tobramycin  80 mg Nebulization Q8H - RT     Continuous Infusions:  PRN Meds:.acetaminophen **OR** acetaminophen, alteplase, bisacodyL, dextrose 50 % (D50W), docusate sodium, glucagon (human recombinant), hydrALAZINE, HYDROmorphone, magnesium hydroxide, naloxone **OR** naloxone **OR** naloxone **OR** naloxone, polyethylene glycol, sodium chloride 0.9%, sodium chloride    Lab Results:  personally reviewed.   not applicable  Recent Results (from the past 24 hour(s))   POCT Glucose    Collection Time: 06/29/21 12:34 PM    Specimen: Blood   Result Value Ref Range    Glucose 126 70 - 139 mg/dL   POCT Glucose    Collection Time: 06/29/21  5:07 PM    Specimen: Blood   Result Value Ref Range    Glucose 105 70 - 139 mg/dL   POCT Glucose    Collection Time: 06/29/21 11:51 PM    Specimen: Blood   Result Value Ref Range    Glucose 125 70 - 139 mg/dL   POCT Glucose    Collection Time: 06/30/21  5:34 AM    Specimen: Blood   Result Value Ref Range    Glucose 148 (H) 70 - 139 mg/dL   HM1 (CBC with Diff)     Collection Time: 06/30/21  5:54 AM   Result Value Ref Range    WBC 7.7 4.0 - 11.0 thou/uL    RBC 3.13 (L) 4.40 - 6.20 mill/uL    Hemoglobin 9.7 (L) 14.0 - 18.0 g/dL    Hematocrit 29.8 (L) 40.0 - 54.0 %    MCV 95 80 - 100 fL    MCH 31.0 27.0 - 34.0 pg    MCHC 32.6 32.0 - 36.0 g/dL    RDW 14.5 11.0 - 14.5 %    Platelets 399 140 - 440 thou/uL    MPV 9.9 8.5 - 12.5 fL    Neutrophils % 74 (H) 50 - 70 %    Lymphocytes % 15 (L) 20 - 40 %    Monocytes % 8 2 - 10 %    Eosinophils % 2 0 - 6 %    Basophils % 0 0 - 2 %    Immature Granulocyte % 0 <=0 %    Neutrophils Absolute 5.7 2.0 - 7.7 thou/uL    Lymphocytes Absolute 1.2 0.8 - 4.4 thou/uL    Monocytes Absolute 0.6 0.0 - 0.9 thou/uL    Eosinophils Absolute 0.2 0.0 - 0.4 thou/uL    Basophils Absolute 0.0 0.0 - 0.2 thou/uL    Immature Granulocyte Absolute 0.0 <=0.0 thou/uL     Results from last 7 days   Lab Units 06/30/21  0534 06/29/21  2351 06/29/21  1707 06/29/21  1234 06/29/21  0526 06/28/21  2341 06/28/21  1741 06/28/21  1156 06/28/21  0527 06/27/21  2349   LN-POC GLUCOSE FINGERSTICK- HE mg/dL 148* 125 105 126 142* 111 104 125 117 126           Advance Care Planning:   Barriers to discharge: Multiple issues  Anticipated discharge day: Few days  Disposition: Depending on PT Lori Hastings MD  [facility]  Hospitalist

## 2021-07-04 NOTE — PLAN OF CARE
Plan of Care by Vinny Mendoza LRT at 7/3/2021  4:00 PM     Author: Vinny Mendoza LRT Service: -- Author Type: Respiratory Therapist    Filed: 7/3/2021  4:04 PM Date of Service: 7/3/2021  4:00 PM Status: Signed    : Vinny Mendoza LRT (Respiratory Therapist)       Problem: Mechanical Ventilation  Goal: Patient will maintain patent airway  Outcome: Progressing  Goal: Tracheostomy will be managed safely  Outcome: Progressing  Goal: Respiratory status - ventilation  Outcome: Progressing    RT PROGRESS NOTE     DATA:  CURRENT SETTINGS:   AC 14, 450, +5, 30% (Vent on s/b during daytime.)               TRACH TYPE / SIZE:  #6 Shiley DCT     ACTION:             THERAPIES:   Duonebs Q6; Tobramycin nebs Q8; Hypertonic saline nebs BID.               SUCTION:   5-6x in 12hrs by RN/RT for mod amts of clear/white/py secretions. Good, spont cough.                WEANING PHASE:   2      TM/PMV wean started at 0915, is ongoing, & reji fairly well. Pt has significantly increased voicing w/ encouragement from family/staff compared to prior days. Will return pt to full vent support by HS.     RESPONSE:             BS:   Rhonchi/clear.             VITAL SIGNS:   HR 76-83, RR 21-28, Sats 96-93-98%.               EMOTIONAL NEEDS / CONCERNS:   Minimal.                RISK FOR SELF DECANNULATION:   Yes.                        RISK DUE TO:   Confusion.                        INTERVENTION/S IN PLACE IS/ARE:   Bilat mitten restraints.     NOTE / PLAN:   Continue resp support as needed, monitor closely, & wean as reji.

## 2021-07-04 NOTE — PLAN OF CARE
Plan of Care by Amanda Jean RN at 7/1/2021 10:22 PM     Author: Amanda Jean RN Service: -- Author Type: Registered Nurse    Filed: 7/1/2021 10:32 PM Date of Service: 7/1/2021 10:22 PM Status: Signed    : Amanda Jean RN (Registered Nurse)         Problem: Daily Care  Goal: Daily care needs are met  Outcome: Progressing   Patient was calm, cooperative with cares transfers and repositions. Urine sample sent to lab for urinalysis. Results back negative. Patient appeared more alert today attempted to talk was wide awake  most part of the shift. No sign of pain noted.

## 2021-07-04 NOTE — PLAN OF CARE
Plan of Care by Vinny Mendoza LRT at 7/4/2021  5:05 PM     Author: Vinny Mendoza LRT Service: -- Author Type: Respiratory Therapist    Filed: 7/4/2021  5:06 PM Date of Service: 7/4/2021  5:05 PM Status: Signed    : Vinny Mendoza LRT (Respiratory Therapist)       Problem: Mechanical Ventilation  Goal: Patient will maintain patent airway  Outcome: Progressing  Goal: Tracheostomy will be managed safely  Outcome: Progressing  Goal: Respiratory status - ventilation  Outcome: Progressing    RT PROGRESS NOTE     DATA:  CURRENT SETTINGS:   AC 14, 450, +5, 30% (Vent on s/b during daytime.)               TRACH TYPE / SIZE:  #6 Shiley DCT     ACTION:             THERAPIES:   Duonebs Q6; Tobramycin nebs Q8; Hypertonic saline nebs BID.               SUCTION:   6-7x in 12hrs by RN/RT for mod amts of clear-white/py secretions. Strong productive cough w/ sx. Moderately strong spont cough.               WEANING PHASE:   2       TM/PMV wean started at 0835, is ongoing, & reji fairly well. Will return pt to full vent support by HS.    RESPONSE:             BS:   Rhonchi/clear & decreased.             VITAL SIGNS:   HR 85-82-87, RR24-28, Sats 98-92-97%.               EMOTIONAL NEEDS / CONCERNS:   Minimal.                RISK FOR SELF DECANNULATION:   Yes.                        RISK DUE TO:   Confusion.                        INTERVENTION/S IN PLACE IS/ARE:   Bilat mitten restraints.       NOTE / PLAN:   Continue resp support as needed, monitor closely, & wean as tolerated.      Consider trach downsize to possibly increase TM/PMV endurance & improve swallowing.

## 2021-07-04 NOTE — PROGRESS NOTES
"Progress Notes by Kelly Henley, RN at 6/11/2021  6:14 PM     Author: Kelly Henley RN Service: Hospitalist Author Type: Registered Nurse    Filed: 6/11/2021  6:19 PM Date of Service: 6/11/2021  6:14 PM Status: Attested    : Kelly Henley RN (Registered Nurse) Cosigner: Sheri Beltre MD at 6/12/2021  9:36 AM    Attestation signed by Sheri Beltre MD at 6/12/2021  9:36 AM    I have examined the skin concern identified in this note and agree with the RN documentation and order requests      Sheri Beltre  Hospitalist                Skin/Ostomy Concerns and/ or WOC Consult Request       Identify concerning skin/ostomy issues:      Erythema location:mid-scalp incision    Surgical wound location: multiple scalp incisions to head, Trach inc, GT inc    Other : Rectal tube  location: rectum      Order(s) Requested:  WOC Consult      Nurse submitting note:  Checked \"Cosign Required\" box.      Kelly Henley      Provider Cosigning:    I have examined the skin concern identified in this note and agree with the RN documentation and order request(s).               "

## 2021-07-04 NOTE — PLAN OF CARE
Plan of Care by Mariam Robles RN at 7/2/2021 12:49 PM     Author: Mariam Robles RN Service: -- Author Type: Registered Nurse    Filed: 7/2/2021  2:59 PM Date of Service: 7/2/2021 12:49 PM Status: Signed    : Mariam Robles RN (Registered Nurse)         Problem: Pain  Goal: Patient's pain/discomfort is manageable  Outcome: Progressing     Problem: Safety  Goal: Patient will be injury free during hospitalization  Outcome: Progressing     Problem: Daily Care  Goal: Daily care needs are met  Outcome: Progressing     Problem: Risk of Injury Due to Unsafe Behavior  Goal: Patient will remain safe while in restraint; physical/psychological needs will be met  Outcome: Progressing     Problem: Potential for Compromised Skin Integrity  Goal: Skin integrity is maintained or improved  Outcome: Progressing     Problem: Potential for Falls  Goal: Patient will remain free of falls  Outcome: Progressing     Vital signs stable. No nonverbal signs of pain.  Pt spent time up in chair, visiting with family via iPad.  All care needs met.

## 2021-07-04 NOTE — PLAN OF CARE
Plan of Care by Gail Palmer RN at 7/1/2021  6:44 AM     Author: Gail Palmer RN Service: -- Author Type: Registered Nurse    Filed: 7/1/2021  6:48 AM Date of Service: 7/1/2021  6:44 AM Status: Signed    : Gail Palmer RN (Registered Nurse)         Problem: Pain  Goal: Patient's pain/discomfort is manageable  Outcome: Progressing     Problem: Mechanical Ventilation  Goal: Patient will maintain patent airway  Outcome: Progressing     Problem: Risk of Injury Due to Unsafe Behavior  Goal: Patient will remain safe while in restraint; physical/psychological needs will be met  Outcome: Progressing   Patient had calm shift,  continue on hand mitt and do not show any sign of pain throughout the shift.

## 2021-07-04 NOTE — PROGRESS NOTES
"Progress Notes by Isael Jane MD at 6/30/2021  1:56 PM     Author: Isael Jane MD Service: Critical Care Medicine Author Type: Physician    Filed: 6/30/2021  2:01 PM Date of Service: 6/30/2021  1:56 PM Status: Signed    : Isael Jane MD (Physician)       Pulmonary Medicine Follow up Note - Ventilator Rounds:    Clinical status discussed today with RN and respiratory therapist..    Chief complaint: Ongoing respiratory failure  Significant change in clinical status during past 24 hours? - still having a lot of secretions but improving slightly.  FiO2 up to 0.4 today.  Using PMV today with speech therapy.   Awake but not really talking, said \"sure\" when I asked if he could talk to me.       CURRENT SETTINGS:             TRACH TYPE / SIZE:  6 Shiley placed 6-7-21.              MODE:   PRVC 14/450/+5/24%    Tracheal secretions: overnight: x7, mod x4, large x3  Thick, pale-yellow.    Current phase of ventilator weaning pathway:  Phase 2, HFTM, PMV day, full vent at night.    Vent weaning results from yesterday: HFTM FiO2 0.3, 10Lpm for 11 hours 48 min.       Physical exam   Vitals:    06/30/21 0749 06/30/21 0756 06/30/21 0920 06/30/21 1143   BP:       Patient Position:       Pulse:  81 85 80   Resp:  22 20 20   Temp: 98.3  F (36.8  C)      TempSrc: Oral      SpO2:  93% (!) 86% 90%   Weight:       Height:         Gen: cachectic. No distress  HEENT: pale conjunctiva, moist mucosa  Neck: trach midline, dressing c/d/i  Lungs: diminished ant no wheezing or rhonchi.   CV: regular, no murmurs or gallops appreciated  Abdomen: soft, NT, BS wnl  Ext: no edema  Neuro: lethargic arousable, tone is increase throughout    Results from last 7 days   Lab Units 06/30/21  0554   LN-WHITE BLOOD CELL COUNT thou/uL 7.7   LN-HEMOGLOBIN g/dL 9.7*   LN-HEMATOCRIT % 29.8*   LN-PLATELET COUNT thou/uL 399     Results from last 7 days   Lab Units 06/28/21  1452   LN-SODIUM mmol/L 139   LN-POTASSIUM mmol/L 4.4   LN-CHLORIDE mmol/L " 101   LN-CO2 mmol/L 28   LN-BLOOD UREA NITROGEN mg/dL 37*   LN-CREATININE mg/dL 0.65*   LN-CALCIUM mg/dL 10.3     Culture 4+ Pseudomonas aeruginosaAbnormal     Reported and sent to [facility] as part of the  Emerging Infections Surveillance Program.          Gram Stain Result  4+ Polymorphonuclear leukocytes      2+ Gram negative bacilli            Resulting Agency: [facility]   Susceptibility     Pseudomonas aeruginosa     SUSAN     Aztreonam >16  Resistant     Cefepime >16  Resistant     Ceftazidime >16  Resistant     Ciprofloxacin 2  Resistant     Gentamicin <=2  Sensitive     Levofloxacin 4  Resistant     Meropenem >8  Resistant     Piperacillin + Tazobactam >64/4  Resistant     Tobramycin <=2  Sensitive         XR CHEST 1 VIEW PORTABLE  LOCATION: [facility]  DATE/TIME: 6/18/2021 4:37 PM     INDICATION: increased secretions  COMPARISON: 06/14/2021, 06/09/2021     IMPRESSION:   Tracheostomy tube in place. Left basilar opacities partially silhouetting the left hemidiaphragm are unchanged and again presumed to reflect some atelectasis. Right lung is clear. Heart and pulmonary vascularity are normal. Gastrostomy tube.    Diagnosis:     Patient is a 71 yo man with history of hypertension. Patient initially presented on 15-May-2021 to [facility] after being found unresponsive by his wife. Patient was then found to have a subarachnoid hemorrhage secondary to a ruptured aneurysm. Patient was intubated and was started on mannitol, IV antihypertensives and hyperventilation and patient was then transferred to [facility]. Patient underwent aneurysm clipping as well as placement of extra-ventricular drain from hydrocephalus on 15-May-2021. Extra-ventricular drain would malfunction and would require replacement on 04-Jun-2021 and 06-Jun-2021. Extra-ventricular drain reportedly was removed on 09-Jun-2021. Patient had intra-arterial vasospasm treatment with verapamil on  28-May-2021.  Patient was extubated on 16-May-2021 but had to be reintubated on 19-May-2021. Patient would be extubated on 03-Jun-2021 but would be reintubated on 06-Jun-2021. Patient had tracheostomy and PEG-tube placed on 07-Jun-2021. Patient required treatment for hospital-acquired pneumonia. Patient was transferred to [facility] on 11-Jun-2021.    1. Acute respiratory failure s/p trach 6/7/2021  2. Pseudomonas acute tracheobronchitis started doris nebs  3. Encephalopathy   4. Subarachnoid bleed s/p craniotomy and clipping P-comm rupture aneurysm.   5. HTN  6. Malnourishment   7. Dysphagia s/p G tube    Recommendations/Plans (MDM):  1. Weaning orders: continue phase 2, TM/PMV during the day full vent at night. Able to resume PMV today for first time in a while.    2. Trach change? no  3. Diagnostic testing? no  4. Cont doris neb abx for 8 days, re-evaluate this week and extend to 10-14 days if still having a lot of secretions. Today is day 7  5. Continue pulmonary toiletting scheduled duonebs q6h, HS nebs two times a day  No indication for bronch at this time as seems to be responding to above. If desaturating or evidence of mucus plugging, then we can do a therapeutic suction bronch.   6. DVT prophylaxis: heparin subcutaneous    Discussed extensively with patient's wife and daughter over the iPad over the last few days.  Emphasized that his weakness, malnourishment is a major factor in slow progress. High risk for set back but hopefully will get stronger and can get off the vent.     Isael (Bobby) MD [place]  [facility]  Pulmonary & Critical Care  Pager [phone]  Clinic [phone]

## 2021-07-04 NOTE — PLAN OF CARE
Plan of Care by Cesar Odonnell RN at 6/30/2021  1:11 PM     Author: Cesar Odonnell RN Service: -- Author Type: Registered Nurse    Filed: 6/30/2021  2:17 PM Date of Service: 6/30/2021  1:11 PM Status: Addendum    : Cesar Odonnell RN (Registered Nurse)    Related Notes: Original Note by Cesar Odonnell RN (Registered Nurse) filed at 6/30/2021  1:14 PM         Problem: Pain  Goal: Patient's pain/discomfort is manageable  Outcome: Progressing   Nos/s of pain/discomfort.  Problem: Daily Care  Goal: Daily care needs are met  Outcome: Progressing   up in the chair for therapies.  Problem: Mechanical Ventilation  Goal: Tracheostomy will be managed safely  Outcome: Progressing   Large amounts of thick, whitish to yellow secretions suctioned from trach; on trach mask. 02 sats 91-95%.  Problem: Risk of Injury Due to Unsafe Behavior  Goal: Patient will remain safe while in restraint; physical/psychological needs will be met  Outcome: Progressing   Shifting to bilateral mittens as restraints for safety; will monitor.  Problem: Urinary Incontinence  Goal: Perineal skin integrity is maintained or improved  Outcome: Progressing   Incontinent of urine; using condom cath with good urine output.

## 2021-07-04 NOTE — PROGRESS NOTES
Progress Notes by David Coffman MA, CCC-SLP at 6/30/2021  9:29 AM     Author: David Coffman MA, CCC-SLP Service: -- Author Type: Speech and Language Pathologist    Filed: 6/30/2021  9:51 AM Date of Service: 6/30/2021  9:29 AM Status: Signed    : David Coffman MA, CCC-SLP (Speech and Language Pathologist)       Speech Language/Pathology  Speech Therapy Daily Progress Note  Consulted with MD re: consideration of stimulant of sorts to target alertness, attention and engagement, as well as initiation.   Patient presents as lethargic and cooperative during this session.  An  was not applicable. Wife present via IPad.  Oral cares provided. Along with RT, provided suctioning and placed speaking valve.     Objective  Verbal expression: to address impairment in communication of needs, wants, and ideas    Responsive naming was 40 % response rate and 30% accurate independently. Initially had strong voice, somewhat wet in quality, but quickly decreased in volume. At times unintelligible. Needed increased time to respond and repetition at times. Compromised by lethargy, decreased attention. Did not significantly improve with tactile, verbal cues and cold compress.     Extensive education with wife re: balancing periods of rest with periods of stimulation.     Assessment  Demonstrates variable responsiveness. Started off fairly strong but quickly decreased in alertness. Would benefit from ongoing balance of stimulation and rest. May benefit from ECR schedule. Patient making slow progress. Continued skilled SLP services are warranted to optimize patient status/progress toward goals in POC. Goals in POC not targeted in current session d/t emphasis on above areas remain appropriate.     Plan/Recommendations  Will consult with OT re: ECR schedule. Continue per plan of care.    The ST Care Plan has been reviewed and current plan remains appropriate.    28 speech/language minutes     David CASTILLO  ANTHONY Coffman, CCC-SLP

## 2021-07-04 NOTE — PLAN OF CARE
Plan of Care by Timoteo Tran LRT at 7/1/2021  5:47 AM     Author: Timoteo Tran LRT Service: -- Author Type: Respiratory Therapist    Filed: 7/1/2021  5:47 AM Date of Service: 7/1/2021  5:47 AM Status: Signed    : Timoteo Tran LRT (Respiratory Therapist)       Problem: Mechanical Ventilation  Goal: Patient will maintain patent airway  Outcome: Progressing  Goal: Tracheostomy will be managed safely  Outcome: Progressing  Goal: Respiratory status - ventilation  Description: Movement of air in and out of the lungs.    Liberate from ventilator  Outcome: Progressing  RT PROGRESS NOTE     DATA:     CURRENT SETTINGS:  14/450/+5             TRACH TYPE / SIZE: #6 [place]ricket DCT (Placed 06/07)             MODE:  PRVC             FIO2:   40%     ACTION:             THERAPIES: Duo-neb Q6/Saline BID/Barry Q8             SUCTION:  Yes                          FREQUENCY: 6x                        AMOUNT:   Moderate x4 to Large x2                        CONSISTENCY: Thick                        COLOR:   Pale yellow             SPONTANEOUS COUGH EFFORT/STRENGTH OF EFFORT (not elicited by suctioning): Yes, strong productive cough.                              WEANING PHASE:   2                        WEAN MODE:  HFTM                        WEAN TIME:  12 hours and 53 minutes, PMV 10 hours and 38 minutes                        END WEAN REASON: Tired, full vent at noc     RESPONSE:             BS:   Coarse             VITAL SIGNS: Sat 94-98%, HR 86-97, RR 17-28             EMOTIONAL NEEDS / CONCERNS:  None                RISK FOR SELF DECANNULATION:  Yes                        RISK DUE TO: Confusion                        INTERVENTION/S IN PLACE IS/ARE: Mitts x2       NOTE / PLAN:     Pt remains on full vent and tolerating well with no distress.  Continue current care plan.

## 2021-07-04 NOTE — PROGRESS NOTES
[facility] - Palliative Care Progress Note    PATIENT NAME: Dayron Neri  DATE OF ADMISSION: 6/11/2021   Today the patient was seen for: Symptom management  Goals of care  Patient/family support       Impressions & Recommendations     Goals of care    Restorative with hopes to eventually get back home    Family requesting 1hr RN checks.  Discussed with Charge RN who is aware of the concern      Give patient breaks from iPAD/family to promote rest - family are in agreement with this     Code status: Full Code - not discussed during this visit, but has been addressed during this admission      Advanced Care Planning    Patient has a completed Health Care Directive: No    Surrogate decision maker if no appointed agent: wife Susy     Symptom management  # Pain: Denies.  Pain scores 0-1.    - Tylenol PRN  - IV Dilaudid 0.2-0.4mg q3hr PRN  # Anxiety: denies  # Nausea: denies   # Dyspnea: in setting of acute hypoxic resp failure.  Remains on nocturnal vent with TM weans during the day.  Secretion issues and now on doris nebs with pseudomonas in sputum 6/19.  Pt denies shortness of breath today.    - per pulm team    # Constipation: last BM 6/29  - Bowel regimen   # Fatigue: in setting of acute illness   - PT/OT, OOB  # Dysphagia s/p G tube   # Encephalopathy: in setting of SAH s/p craniotomy and clipping P-comm ruptured aneurysm, and b/l infarct.  EEG neg for seizures.  Seems to be improving per chart review and discussion with family.   - Avoid benzodiazepines, antihistamines, anticholinergics if able  - Lights on and blinds open during the day.  Reorient frequently  - Lights & TV off during the night.  Promote normal circadian rhythm  - Limit sensory deprivation - utilize hearing aids, glasses, etc  - Frequently assess unmet bathroom needs if applicable.    - Restraints PRN for safety    Psychosocial/spiritual support: lives with wife.  Has a daughter.  Patient is Bahai.      Palliative SW following       Discussed with Pulmonary, RT, and Charge RN      Summary          70 yoM with PMH HTN admitted 5/15/21  for AMS and a fall. Found to have acute subarachnoid hemorrhage and underwent left external ventricular drain placement.  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  Course complicated by CT head showing bilateral BAYLEE infarct.  Extubated 5/16, re-intubated 5/19, extubated again 6/3/2021.  Re-intubated again 6/6/2021 d/t hypoxia.  S/p trach/PEG on 6/7/2021, EVD removed 6/9/2021.  Admitted to [facility] 6/11.              Palliative encounter:     I visited patient at bedside.  He was able to follow some simple commands for me and track.  He was sitting in the chair on HFTM in no distress.  His wife and daughter were on the iPAD.  They expressed some concerns including: inconsistent treatments from different staff members, not having hourly checks.  I provided active listening and noted I will discuss with the charge RN to relay their concerns.  We discussed his current neurologic, pulmonary, and nutrition status.  They are concerned about his tracheal secretions and malnutrition/wt loss.  I expressed we may not see significant improvement in either of these areas until his cognition improves more.  It seems he's making some neurologic improvements which they agree with, but note it has been slow going and frustrating.  I explained vent weaning in more detail so they no what to expect for the future which they appreciated.  I also recommended they take some breaks from the iPAD and not have the screen in his face 24/7 as this likely is inhibiting him to rest.  They were in agreement with this.  They appreciated the follow up and welcome future visits from our service in the future.      Prognosis, Goals, or Advance Care Planning was addressed today with: Yes.  Mood, coping, and/or meaning in the context of serious illness were addressed today: Yes.           Review  of Systems:     A full 14 point review of systems was otherwise completed and is negative aside from that mentioned above.          Medications:     I have reviewed this patient's medication profile and medications during this hospitalization.      famotidine  20 mg Enteral Tube BID     guar gum  1 packet Enteral Tube TID     heparin (PF) ANTICOAGULANT  5,000 Units Subcutaneous Q8H FIXED TIMES     insulin aspart (NovoLOG) injection   Subcutaneous Q6H FIXED TIMES     ipratropium-albuteroL  3 mL Nebulization Q6H - RT     [Held by provider] lisinopriL  20 mg Enteral Tube Daily     menthol-zinc oxide   Topical TID     sodium chloride  3 mL Nebulization BID     tobramycin  80 mg Nebulization Q8H - RT        PRN MEDS:   acetaminophen **OR** acetaminophen, alteplase, bisacodyL, dextrose 50 % (D50W), docusate sodium, glucagon (human recombinant), hydrALAZINE, HYDROmorphone, magnesium hydroxide, naloxone **OR** naloxone **OR** naloxone **OR** naloxone, polyethylene glycol, sodium chloride 0.9%, sodium chloride          Objective:   Vital Last Value 24 Hour Range (min/max)    Temp  Min: 97.5  F (36.4  C)  Max: 98.8  F (37.1  C)    Pulse  Min: 77  Max: 85    Resp  Min: 17  Max: 26    BP  Min: 104/67  Max: 132/78    SpO2  Min: 93 %  Max: 97 %      PHYSICAL EXAMINATION:   General Appearance: sitting in chair in NAD, b/l soft wrist restraints on  HEENT: Normocephalic, atraumatic.  Lips, mucosa, and tongue moist  Resp: CTA anterolaterally; non-labored   CV:  RRR; mild ROSE  Abdomen: Soft, nontender, bowel sounds active  Extremities: Normal, atraumatic, minimal movement  Skin: Warm and dry, no rashes.   Neurologic: alert to voice, tracks, follows commands    Psych:  Calm            Data Reviewed:     Reviewed recent pertinent imaging, comments:   No results found.  For a range for imaging in the last 24 hours    Reviewed recent labs, comments:   Lab Results:  personally reviewed  Lab Results   Component Value Date      06/28/2021    K 4.4 06/28/2021     06/28/2021    CO2 28 06/28/2021    BUN 37 (H) 06/28/2021    CREATININE 0.65 (L) 06/28/2021    CALCIUM 10.3 06/28/2021     Lab Results   Component Value Date    WBC 7.3 06/28/2021    HGB 9.6 (L) 06/28/2021    HCT 29.8 (L) 06/28/2021    MCV 96 06/28/2021     06/28/2021     Lab Results   Component Value Date/Time    ALBUMIN 2.1 (L) 06/14/2021 02:50 PM     Wt Readings from Last 3 Encounters:   06/29/21 137 lb 9.6 oz (62.4 kg)         Information gathered today from: patient, family/loved ones, medical team members, unit team members and chart review in Epic     TTS: I have personally spent a total of 70 minutes  today on the unit in review of medical record, consultation with the medical providers and assessment of patient today, with more than 50% of this time spent in counseling, coordination of care, and conversation patient, wife, and daughter re: diagnostic results, prognosis, symptom management, risks and benefits of management options,  emotional support and development of plan of care     Maximus Mathis, TRISTAN  Palliative Medicine  [facility]  Pager [phone]        During regular M-F work hours -- if you are not sure who specifically to contact -- please contact us by calling [phone].

## 2021-07-04 NOTE — PROGRESS NOTES
Progress Notes by Jamin Davis MD at 7/2/2021  4:13 PM     Author: Jamin Davis MD Service: Critical Care Medicine Author Type: Physician    Filed: 7/2/2021  4:21 PM Date of Service: 7/2/2021  4:13 PM Status: Signed    : Jamin Davis MD (Physician)       Pulmonary Medicine Follow up Note - Ventilator Rounds:    Clinical status discussed today with respiratory therapist.    Chief complaint: Ongoing respiratory failure  Significant change in clinical status during past 24 hours? no     CURRENT SETTINGS:             TRACH TYPE / SIZE: 6 Shiley placed 6-7-21.              MODE: PRVC 14/450/+5/24%    Tracheal secretions: overnight: x2-3, copious thick yellow/white, strong productive cough    Current phase of ventilator weaning pathway:  Phase 2, HFTM/PMV day, full vent at night    Vent weaning results from yesterday: HFTM/PMV x 11.5 hrs (similar to 6/30)    Physical exam   Vitals:    07/02/21 0835 07/02/21 1030 07/02/21 1515 07/02/21 1557   BP:    110/73   Patient Position:    Lying   Pulse: 84 85 81 82   Resp: 24 24 24 16   Temp:    98.9  F (37.2  C)   TempSrc:    Oral   SpO2: 97% 92% 98% 97%   Weight:       Height:         Gen: Cachectic, no distress, generalized weakness  HEENT: pale conjunctiva, moist mucosa  Neck: trach midline, dressing c/d/i  Lungs: diminished ant no wheezing or rhonchi  CV: regular, no murmurs or gallops appreciated  Abdomen: soft, NT, BS wnl  Ext: no edema  Neuro: alert, generalized weakness, tone is increased throughout    Results from last 7 days   Lab Units 07/02/21  0528   LN-WHITE BLOOD CELL COUNT thou/uL 8.3   LN-HEMOGLOBIN g/dL 10.4*   LN-HEMATOCRIT % 32.6*   LN-PLATELET COUNT thou/uL 386     Results from last 7 days   Lab Units 07/02/21  1519   LN-SODIUM mmol/L 137   LN-POTASSIUM mmol/L 4.3   LN-CHLORIDE mmol/L 100   LN-CO2 mmol/L 26   LN-BLOOD UREA NITROGEN mg/dL 27*   LN-CREATININE mg/dL 0.63*   LN-CALCIUM mg/dL 10.3     Culture 4+ Pseudomonas aeruginosaAbnormal      Reported and sent to [facility] as part of the  Emerging Infections Surveillance Program.          Gram Stain Result  4+ Polymorphonuclear leukocytes      2+ Gram negative bacilli            Resulting Agency: [facility]   Susceptibility     Pseudomonas aeruginosa     SUSAN     Aztreonam >16  Resistant     Cefepime >16  Resistant     Ceftazidime >16  Resistant     Ciprofloxacin 2  Resistant     Gentamicin <=2  Sensitive     Levofloxacin 4  Resistant     Meropenem >8  Resistant     Piperacillin + Tazobactam >64/4  Resistant     Tobramycin <=2  Sensitive         XR CHEST 1 VIEW PORTABLE  LOCATION: [facility]  DATE/TIME: 6/18/2021 4:37 PM     INDICATION: increased secretions  COMPARISON: 06/14/2021, 06/09/2021     IMPRESSION:   Tracheostomy tube in place. Left basilar opacities partially silhouetting the left hemidiaphragm are unchanged and again presumed to reflect some atelectasis. Right lung is clear. Heart and pulmonary vascularity are normal. Gastrostomy tube.    Diagnosis:     Patient is a 69 yo man with history of hypertension. Patient initially presented on 15-May-2021 to [facility] after being found unresponsive by his wife. Patient was then found to have a subarachnoid hemorrhage secondary to a ruptured aneurysm. Patient was intubated and was started on mannitol, IV antihypertensives and hyperventilation and patient was then transferred to [facility]. Patient underwent aneurysm clipping as well as placement of extra-ventricular drain from hydrocephalus on 15-May-2021. Extra-ventricular drain would malfunction and would require replacement on 04-Jun-2021 and 06-Jun-2021. Extra-ventricular drain reportedly was removed on 09-Jun-2021. Patient had intra-arterial vasospasm treatment with verapamil on 28-May-2021.  Patient was extubated on 16-May-2021 but had to be reintubated on 19-May-2021. Patient would be extubated on 03-Jun-2021 but would be reintubated on  06-Jun-2021. Patient had tracheostomy and PEG-tube placed on 07-Jun-2021. Patient required treatment for hospital-acquired pneumonia. Patient was transferred to LTAC on 11-Jun-2021.    1. Acute respiratory failure s/p trach 6/7/2021  2. Pseudomonas acute tracheobronchitis started doris nebs  3. Encephalopathy   4. Subarachnoid bleed s/p craniotomy and clipping P-comm rupture aneurysm.   5. HTN  6. Malnourishment   7. Dysphagia s/p G tube    Recommendations/Plans (MDM):  1. Weaning orders: Continue phase 2, TM/PMV during the day full vent at night, restarted PMV 6/30 for first time in a while, tolerating well but has critical illness neuromyopathy, needs aggressive nutrition, PT/OT, bronchial hygiene. If he looks considerably stronger with better secretion management next week, could consider trials off the vent at night; would check morning ABG if this is attempted.  2. Trach change? Routine 6 shiley, next due for change 7/7/21; could consider 7 bivona (this would be a downsize from 6 shiley in terms of outer diameter) to facilitate progress with swallowing and speech  3. Diagnostic testing? no  4. Pseudomonas pneumonia/airway colonization: Continued doris nebs for 7 days through 7/2; will extend to 14 days given persistent secretion volumes, though likely to struggle with persistent airway Pseudomonas colonization  5. Continue pulmonary toiletting scheduled duonebs q6h, HS nebs two times a day  6. No indication for bronch at this time as seems to be responding to above. If recurrent fevers, stalled weaning, persistent heavy secretions, consider repeat chest imaging, sputum culture, possible bronchoscopy  7. DVT prophylaxis: heparin subcutaneous    Jamin Davis MD  Pulmonary and Critical Care Medicine  [facility]  Office [phone]

## 2021-07-04 NOTE — PLAN OF CARE
Plan of Care by Yeimi Bradford RN at 7/4/2021  6:43 PM     Author: Yeimi Bradford RN Service: -- Author Type: Registered Nurse    Filed: 7/4/2021  6:46 PM Date of Service: 7/4/2021  6:43 PM Status: Signed    : Yeimi Bradford RN (Registered Nurse)         Problem: Pain  Goal: Patient's pain/discomfort is manageable  Outcome: Progressing   No s/s pain, no prn given.     Problem: Safety  Goal: Patient will be injury free during hospitalization  Outcome: Progressing   Continue restraints to protect tubes, pt holds very tight.    Problem: Daily Care  Goal: Daily care needs are met  Outcome: Progressing   Pt up in chair,tolerated well.suction excessive amount of secretion from trach.

## 2021-07-04 NOTE — PLAN OF CARE
Plan of Care by Vinny Mendoza LRT at 7/2/2021  5:45 PM     Author: Vinny Mendoza LRT Service: -- Author Type: Respiratory Therapist    Filed: 7/2/2021  5:48 PM Date of Service: 7/2/2021  5:45 PM Status: Signed    : Vinny Mendoza LRT (Respiratory Therapist)       Problem: Mechanical Ventilation  Goal: Patient will maintain patent airway  Outcome: Progressing  Goal: Tracheostomy will be managed safely  Outcome: Progressing  Goal: Respiratory status - ventilation  Outcome: Progressing     RT PROGRESS NOTE     DATA:  CURRENT SETTINGS:   AC 14, 450, +5, 40% (S/B during daytime.)               TRACH TYPE / SIZE:   #6 Shiley DCT     ACTION:             THERAPIES:   Duonebs Q6; Tobramycin nebs Q8; Hypertonic saline nebs BID.               SUCTION:   7-8x in 12hr shift by RN/RT for mod amts of py/clear-white secretions. Good, spont cough.               WEANING PHASE:   2    TM/PMV wean started at 0835, is ongoing, & reji fairly well. Minimal voicing efforts noted despite encouragement. Will return pt to full vent support by HS.     RESPONSE:             BS:   Rhonchi/clear.             VITAL SIGNS:   HR 87-81, RR  20-28, Sats 98-92-96%.               EMOTIONAL NEEDS / CONCERNS:   Minimal.                RISK FOR SELF DECANNULATION:   Yes.                        RISK DUE TO:   Confusion.                        INTERVENTION/S IN PLACE IS/ARE:   Bilat mitten restraints.       NOTE / PLAN:   Continue resp support as needed, monitor closely, & wean as reji.

## 2021-07-04 NOTE — PLAN OF CARE
Plan of Care by Amanda Jean RN at 6/30/2021 10:17 PM     Author: Amanda Jean RN Service: -- Author Type: Registered Nurse    Filed: 6/30/2021 10:27 PM Date of Service: 6/30/2021 10:17 PM Status: Signed    : Amanda Jean RN (Registered Nurse)         Problem: Daily Care  Goal: Daily care needs are met  Outcome: Progressing   Patient was calm, cooperative with cares and repositions. Patient's affect much improved . No sign of pain noted,

## 2021-07-04 NOTE — PROGRESS NOTES
Progress Notes by Jamin Davis MD at 7/1/2021  3:01 PM     Author: Jamin Davis MD Service: Critical Care Medicine Author Type: Physician    Filed: 7/1/2021  3:07 PM Date of Service: 7/1/2021  3:01 PM Status: Signed    : Jamin Davis MD (Physician)       Pulmonary Medicine Follow up Note - Ventilator Rounds:    Clinical status discussed today with respiratory therapist.    Chief complaint: Ongoing respiratory failure  Significant change in clinical status during past 24 hours? - Secretions seem to be a bit better when PMV on per RT; perhaps he is able to clear them more easily. Working with PT during my rounds. Was able to tolerate long HFTM wean on 6/30, during most of most of which he has PMV on (see below).     CURRENT SETTINGS:             TRACH TYPE / SIZE: 6 Shiley placed 6-7-21.              MODE: PRVC 14/450/+5/24%    Tracheal secretions: overnight: x6 (mod x4, large x2), thick pale yellow    Current phase of ventilator weaning pathway:  Phase 2, HFTM/PMV day, full vent at night.    Vent weaning results from yesterday: HFTM 40% 10 L/min x 12 hrs 53 min (PMV on for 10 hrs 38 min)      Physical exam   Vitals:    07/01/21 0637 07/01/21 0756 07/01/21 0823 07/01/21 1321   BP:  116/66     Patient Position:  Lying     Pulse:  81 85 81   Resp:  20 20 20   Temp:  98.6  F (37  C)     TempSrc:  Axillary     SpO2:  99% 96% 96%   Weight: 140 lb 6.4 oz (63.7 kg)      Height:         Gen: Cachectic, no distress, getting lifted up into chair with PT for therapy  HEENT: pale conjunctiva, moist mucosa  Neck: trach midline, dressing c/d/i  Lungs: diminished ant no wheezing or rhonchi  CV: regular, no murmurs or gallops appreciated  Abdomen: soft, NT, BS wnl  Ext: no edema  Neuro: alert, generalized weakness, tone is increase throughout    Results from last 7 days   Lab Units 06/30/21  0554   LN-WHITE BLOOD CELL COUNT thou/uL 7.7   LN-HEMOGLOBIN g/dL 9.7*   LN-HEMATOCRIT % 29.8*   LN-PLATELET COUNT thou/uL  399     Results from last 7 days   Lab Units 06/30/21  1441   LN-SODIUM mmol/L 141   LN-POTASSIUM mmol/L 4.4   LN-CHLORIDE mmol/L 102   LN-CO2 mmol/L 30   LN-BLOOD UREA NITROGEN mg/dL 27*   LN-CREATININE mg/dL 0.61*   LN-CALCIUM mg/dL 10.4     Culture 4+ Pseudomonas aeruginosaAbnormal     Reported and sent to [facility] as part of the  Emerging Infections Surveillance Program.          Gram Stain Result  4+ Polymorphonuclear leukocytes      2+ Gram negative bacilli            Resulting Agency: [facility]   Susceptibility     Pseudomonas aeruginosa     SUSAN     Aztreonam >16  Resistant     Cefepime >16  Resistant     Ceftazidime >16  Resistant     Ciprofloxacin 2  Resistant     Gentamicin <=2  Sensitive     Levofloxacin 4  Resistant     Meropenem >8  Resistant     Piperacillin + Tazobactam >64/4  Resistant     Tobramycin <=2  Sensitive         XR CHEST 1 VIEW PORTABLE  LOCATION: [facility]  DATE/TIME: 6/18/2021 4:37 PM     INDICATION: increased secretions  COMPARISON: 06/14/2021, 06/09/2021     IMPRESSION:   Tracheostomy tube in place. Left basilar opacities partially silhouetting the left hemidiaphragm are unchanged and again presumed to reflect some atelectasis. Right lung is clear. Heart and pulmonary vascularity are normal. Gastrostomy tube.    Diagnosis:     Patient is a 71 yo man with history of hypertension. Patient initially presented on 15-May-2021 to [facility] after being found unresponsive by his wife. Patient was then found to have a subarachnoid hemorrhage secondary to a ruptured aneurysm. Patient was intubated and was started on mannitol, IV antihypertensives and hyperventilation and patient was then transferred to [facility]. Patient underwent aneurysm clipping as well as placement of extra-ventricular drain from hydrocephalus on 15-May-2021. Extra-ventricular drain would malfunction and would require replacement on 04-Jun-2021 and 06-Jun-2021.  Extra-ventricular drain reportedly was removed on 09-Jun-2021. Patient had intra-arterial vasospasm treatment with verapamil on 28-May-2021.  Patient was extubated on 16-May-2021 but had to be reintubated on 19-May-2021. Patient would be extubated on 03-Jun-2021 but would be reintubated on 06-Jun-2021. Patient had tracheostomy and PEG-tube placed on 07-Jun-2021. Patient required treatment for hospital-acquired pneumonia. Patient was transferred to [facility] on 11-Jun-2021.    1. Acute respiratory failure s/p trach 6/7/2021  2. Pseudomonas acute tracheobronchitis started doris nebs  3. Encephalopathy   4. Subarachnoid bleed s/p craniotomy and clipping P-comm rupture aneurysm.   5. HTN  6. Malnourishment   7. Dysphagia s/p G tube    Recommendations/Plans (MDM):  1. Weaning orders: continue phase 2, TM/PMV during the day full vent at night, restarted PMV 6/30 for first time in a while, tolerating well  2. Trach change? Routine 6 shiley, next due for change 7/7/21, no clear reason to change to bivona at this point  3. Diagnostic testing? no  4. Cont doris neb abx for 8 days through 7/2; continue to monitor secretion volume/consistency/color  5. Continue pulmonary toiletting scheduled duonebs q6h, HS nebs two times a day  6. No indication for bronch at this time as seems to be responding to above. If desaturating or evidence of mucus plugging, then we can do a therapeutic suction bronch.   7. DVT prophylaxis: heparin subcutaneous    Dr. Jane previously extensively with patient's wife and daughter over the iPad over the last few days.  Emphasized that his weakness, malnourishment is a major factor in slow progress. High risk for set back but hopefully will get stronger and can get off the vent.    Jamin Davis MD  Pulmonary and Critical Care Medicine  [facility]  Office [phone]

## 2021-07-04 NOTE — PLAN OF CARE
"  Problem: Mechanical Ventilation  Goal: Patient will maintain patent airway  Outcome: Progressing  Goal: Tracheostomy will be managed safely  Outcome: Progressing  Goal: Respiratory status - ventilation  Description: Movement of air in and out of the lungs.    Liberate from ventilator  Outcome: Progressing   RT PROGRESS NOTE     DATA:     CURRENT SETTINGS:             TRACH TYPE / SIZE:  #6 shiley changed 6/7             MODE:   prvc/ac 14, 450,  peep 5, 24%                 ACTION:            albuterol q6h, saline two times a day, tobramycin q8h             SUCTION:                           FREQUENCY:   x6                        AMOUNT:   copious in the morning and will cuff deflation                        CONSISTENCY:   thick to  normal                        COLOR:   pale yellow to white             SPONTANEOUS COUGH EFFORT/STRENGTH OF EFFORT (not elicited by suctioning): moderate                              WEANING PHASE:   2                        WEAN MODE:    30% and 10L tm since 09:00                        WEAN TIME:   attempted cuff deflation for pmv trial.  Pt had copious amounts of mucous and after discussion, decision was to leave cuff up today and no pmv trial.                            RESPONSE:             BS:   coarse and diminished             VITAL SIGNS:   Blood pressure 117/57, pulse 75, temperature 98  F (36.7  C), temperature source Axillary, resp. rate 18, height 5' 11\" (1.803 m), weight 137 lb 9.6 oz (62.4 kg), SpO2 96 %.                 EMOTIONAL NEEDS / CONCERNS:  no               RISK FOR SELF DECANNULATION:  yes                         RISK DUE TO:  confusion/impulsive                        INTERVENTION/S IN PLACE IS/ARE:  bilateral wrist restraints       NOTE / PLAN:   Continue pulmonary toilet    "

## 2021-07-04 NOTE — PLAN OF CARE
"Plan of Care by Joe Arango RN at 7/2/2021  3:30 AM     Author: Joe Arango RN Service: -- Author Type: Registered Nurse    Filed: 7/2/2021  7:02 AM Date of Service: 7/2/2021  3:30 AM Status: Signed    : Joe Arango RN (Registered Nurse)         Problem: Pain  Goal: Patient's pain/discomfort is manageable  Outcome: Progressing   No nonverbal signs of pain noted; Pt was also able to mouth the word \"no\" when asked about pain.    Problem: Risk of Injury Due to Unsafe Behavior  Goal: Patient will remain safe while in restraint; physical/psychological needs will be met  Outcome: Progressing   Pt remains on mitt restraints; mostly calm at night, had one incident overnight when he knocked the vent tubing out of place.  Problem: Urinary Incontinence  Goal: Perineal skin integrity is maintained or improved  Outcome: Progressing   External catheter changed; is patent and draining.  Problem: Glucose Imbalance  Goal: Achieve optimal glucose control  Outcome: Not Applicable this Shift   Sliding scale insulin has been discontinued.       "

## 2021-07-04 NOTE — PLAN OF CARE
Plan of Care by Mariam Robles RN at 7/1/2021  3:57 PM     Author: Mariam Robles RN Service: -- Author Type: Registered Nurse    Filed: 7/1/2021  3:58 PM Date of Service: 7/1/2021  3:57 PM Status: Signed    : Mariam Robles RN (Registered Nurse)         Problem: Pain  Goal: Patient's pain/discomfort is manageable  Outcome: Progressing     Problem: Safety  Goal: Patient will be injury free during hospitalization  Outcome: Progressing     Problem: Daily Care  Goal: Daily care needs are met  Outcome: Progressing     Problem: Potential for Compromised Skin Integrity  Goal: Skin integrity is maintained or improved  Outcome: Progressing     Problem: Potential for Falls  Goal: Patient will remain free of falls  Outcome: Progressing     Vital signs stable. No nonverbal signs of pain.   All care needs met.

## 2021-07-04 NOTE — PROGRESS NOTES
Progress Notes by Flavia Gibson MA, CCC-SLP at 7/2/2021  1:06 PM     Author: Flavia Gibson MA, CCC-SLP Service: -- Author Type: Speech and Language Pathologist    Filed: 7/2/2021  1:11 PM Date of Service: 7/2/2021  1:06 PM Status: Signed    : Flavia Gibson MA, CCC-SLP (Speech and Language Pathologist)       Speech Language/Pathology  Speech Therapy Daily Progress Note    Patient presents as alert and cooperative during this session. Wife present via ipad in the room. Questions answered as able.   An  was not applicable.    Objective    Voice: In-line PMSV in place. Noted voicing x3 utterances  Oral motor: smile and stick out tongue x1 each with max cues and attempts  Speech: Spontaneous utterances x3 but only 60% intelligible  Language: Automatic Speech task counting- stated '3' on one of many attempts, otherwise no response with automatic tasks despite max cues; did not say hello to wife on ipad despite max cues and model  Cognition: vacillated between attending to therapist and internal distractions, but generally redirectable with breaks  W/E: wrote a single word, possibly 'odilon' but highly illegible; copied a line x1 with max cues    Assessment  Progress limited by attention but does attend with cues. Wife asked appropriate questions and was provided with ideas of tasks to complete outside of therapy.    Plan/Recommendations    The  Care Plan has been reviewed and current plan remains appropriate.    20 speech/language minutes     Flavia Gibson MA, CCC-SLP

## 2021-07-04 NOTE — PROGRESS NOTES
Progress Notes by Trino Sandoval MBBS at 7/2/2021  9:01 AM     Author: Trino Sandoval MBBS Service: Hospitalist Author Type: Physician    Filed: 7/2/2021 10:39 AM Date of Service: 7/2/2021  9:01 AM Status: Signed    : Trino Sandoval MBBS (Physician)       [place] Daily Progress Note      Date of Service: 7/2/2021     Assessment and plan    Increase tracheal secretion  : Ongoing for weeks  ; Pulmonary on board  ; Sputum culture growing Pseudomonas  : Started on tobramycin from 6/26/2021  ; No leukocytosis  ; Repeat chest x-ray today did not show any infiltrate  : Pulmonary note reviewed: Bronchoscopy being considered  : Pulmonary follow-up      Acute metabolic encephalopathy  Considered multifactorial  ; Needing restraints  ; CT head on 6/14 show some possible increase in ventricular size however repeat CT on 6/16 did not show any acute changes  ; Was seen by neurology 6/18  ; Mental status remained same: Awake tracking  ; No significant improvement  ;? Need for repeat imaging, will reconsult neurology to get their opinion  ;? Candidate for stimulant  : Difficult to assess clinically: Although patient appears slightly more responsive today  ; CT head did not show any acute changes  ; Discussed with neurology on 7/1/2021: Planning for repeat EEG  ; Neurology felt he should be okay to use Ritalin  ; Neurosurgery paged: Awaiting callback      Recent subarachnoid hemorrhage:   Presented with fall and unresponsiveness on 5/15/2021.  S/p EVD, angiogram showing ruptured posterior communicating aneurysm followed by craniotomy with clipping of PCOM aneurysm on the same day.  EVD removed on 6/9/2021.    Postoperative period was complicated by vasospasm, bilateral BAYLEE infarct. MRI done on 5/28 showed right frontal parietal, temopral and left parasagittal parietal lobes infarcts.    On 6/1/2021, angiogram showed no vasospasm.   Will need to follow up with Dr. Martinez in Neurosurgery clinic in 3 months with  repeat CTA of head and neck for post-operative follow up    Hypernatremia: due to dehydration.  : Resolved  ; Sodium 143-141 139  ; Sodium 139  on 6/28/2021    Leukocytosis  Resolved     Acute hypoxic respiratory failure:   Initially postoperatively but later complicated by pneumonia.  On mechanical ventilation, tracheostomy done on 6/7/2021.    Vent management per RT and pulmonology.     Hospital acquired pneumonia: Sputum cultures from prior hospitalization showed Klebsiella, Pseudomonas.  Patient has been on antibiotics since 5/20/2021.  On 6/5/2021, was sputum grew ESBL for which patient is on meropenem, until 6/19/2021.    Left inguinal hernia  ; Monitor for signs of any obstruction  ; Outpatient evaluation       Oropharyngeal dysphagia:   On tube feeding via PEG tube, dietitian, speech consult. Video swallow 6/14     Hypertension: On lisinopril     Moderate malnutrition     Hypokalemia - likely secondary to GI loss - resolved.  -On K protocol, RN to manage         This note was dictated using voice recognition software. Any grammatical or context distortions are unintentional and inherent to the software.  Subjective:   Patient seen at bedside  He appears awake, still looks weak but tries to mouth words      Updates from nursing staff at bedside    States he is moving his upper limb, try to touch his trach, so we will continue mittens      Brief History: 70 y.o. old male with past medical history of hypertension who presented to ED on 5/15/2021 for altered mental status. He had a fall and was found unresponsive by his wife. He was found to have acute subarachnoid hemorrhage.  On 5/15, he underwent left external ventricular drain placement.  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 One the same day, he also underwent craniotomy with clipping of PCOM aneurysm.  On 5/28/2021, patient had persistently elevated TCD, CT head showed bilateral BAYLEE infarct.  Next day, for persistent TCD and CTA  showing bilateral BAYLEE vasospasm, was treated with milrinone and verapamil.  On 6/1/2021, angiogram showed no evidence of vasospasm.  He was extubated on 6/3/2021.On 6/6/2021, patient was reintubated due to drop in his saturation.  On 6/7/2021, patient underwent tracheostomy and PEG tube placement.  On 6/9/2021, EVD was removed.  Patient was initially started on Unasyn for possible pneumonia on 5/20/2021 which was switched to ceftriaxone the next day for sputum culture growing Klebsiella.  On 5/23, sputum culture also grew Pseudomonas for which ceftriaxone was switched to Zosyn.  Due to increased white blood cell counts, possibility of ventriculitis was raised however CSF was negative but antibiotic was switched to cefepime and vancomycin on 5/28/2021 with plan to continue for 2 weeks.  Vanco has been discontinued on 6/9/2021.  On 6/5/2021, sputum grew ESBL so cefepime was switched to meropenem which he is on currently.   He was transferred to [facility] on 6/11/21.     6/14/21 CT head done for fatigue- read as slight increase in caliber of lateral and third ventricle  With possibility of developing communicating hydrocephalus. Discussed with Dr. Casillas (neurosurgeon at [facility]), who did not think there was much change, however suggested repeating CT head on 6/16/21 which did not show any change.    Started on tobramycin inhaler from 6/26/2021    Chart reviewed, events noted. Pt seen and examined.     Objective     Vital signs in last 24 hours:  Temp:  [97.5  F (36.4  C)-98.2  F (36.8  C)] 97.5  F (36.4  C)  Heart Rate:  [79-87] 84  Resp:  [18-24] 24  BP: (106-123)/(66-85) 123/85  FiO2 (%):  [35 %-40 %] 35 %  Weight:   142 lb 9.6 oz (64.7 kg)  Weight change: 2 lb 3.2 oz (0.998 kg)  Body mass index is 19.89 kg/m .    Intake/Output last 3 shifts:  I/O last 3 completed shifts:  In: 3148 [NG/GT:3148]  Out: 2925 [Urine:2925]  Intake/Output this shift:  No intake/output data recorded.      Physical Exam:  /85 (Patient  "Position: Lying)   Pulse 84   Temp 97.5  F (36.4  C) (Oral)   Resp 24   Ht 5' 11\" (1.803 m)   Wt 142 lb 9.6 oz (64.7 kg)   SpO2 97%   BMI 19.89 kg/m      FiO2 (%): 35 % O2 Device: Trach mask O2 Flow Rate (L/min): 30 L/min    General Appearance:   Lying in bed, eyes open, appears to be tracking, tries to mouth words   HEENT:    Normocephalic. Pupils equal and reactive. No scleral   Icterus.  Healed surgical scar on scalp   Lungs:     Clear to auscultation bilaterally, respirations unlabored   conducted sounds    Heart::    Regular rate and rhythm, S1 and S2 normal, no murmur, rub   or gallop. No peripheral edema.   Abdomen:   Soft, Non tender. Non distended. Bowel sounds present.  G-tube in place   Extremity :  Left inguinal hernia   Pulses:   2+ and symmetric all extremities   CNS:  Awake, tracks eyes  No clear facial asymmetry  Spontaneous upper limb movements  No clear movement of lower limbs noticed         Scheduled Meds:  ? famotidine  20 mg Enteral Tube BID   ? guar gum  1 packet Enteral Tube TID   ? heparin (PF) ANTICOAGULANT  5,000 Units Subcutaneous Q8H FIXED TIMES   ? ipratropium-albuteroL  3 mL Nebulization Q6H - RT   ? [Held by provider] lisinopriL  20 mg Enteral Tube Daily   ? menthol-zinc oxide   Topical TID   ? sodium chloride  3 mL Nebulization BID   ? tobramycin  80 mg Nebulization Q8H - RT     Continuous Infusions:  PRN Meds:.acetaminophen **OR** acetaminophen, alteplase, bisacodyL, dextrose 50 % (D50W), docusate sodium, glucagon (human recombinant), hydrALAZINE, magnesium hydroxide, naloxone **OR** naloxone **OR** naloxone **OR** naloxone, oxyCODONE, polyethylene glycol, sodium chloride 0.9%, sodium chloride    Lab Results:  personally reviewed.   not applicable  Recent Results (from the past 24 hour(s))   Urinalysis-UC if Indicated    Collection Time: 07/01/21  2:47 PM   Result Value Ref Range    Color, UA Light Yellow Light Yellow, Yellow    Clarity, UA Clear Clear    Glucose, UA Negative " Negative    Protein, UA Negative Negative    Bilirubin, UA Negative Negative    Urobilinogen, UA <2.0 mg/dL <2.0 mg/dL    pH, UA 7.5 5.0 - 8.0    Blood, UA Negative Negative    Ketones, UA Negative Negative    Nitrite, UA Negative Negative    Leukocytes, UA Negative Negative    Specific Gravity, UA 1.011 1.001 - 1.030   HM1 (CBC with Diff)    Collection Time: 07/02/21  5:28 AM   Result Value Ref Range    WBC 8.3 4.0 - 11.0 thou/uL    RBC 3.42 (L) 4.40 - 6.20 mill/uL    Hemoglobin 10.4 (L) 14.0 - 18.0 g/dL    Hematocrit 32.6 (L) 40.0 - 54.0 %    MCV 95 80 - 100 fL    MCH 30.4 27.0 - 34.0 pg    MCHC 31.9 (L) 32.0 - 36.0 g/dL    RDW 14.3 11.0 - 14.5 %    Platelets 386 140 - 440 thou/uL    MPV 10.4 8.5 - 12.5 fL    Neutrophils % 74 (H) 50 - 70 %    Lymphocytes % 13 (L) 20 - 40 %    Monocytes % 8 2 - 10 %    Eosinophils % 3 0 - 6 %    Basophils % 1 0 - 2 %    Immature Granulocyte % 1 (H) <=0 %    Neutrophils Absolute 6.2 2.0 - 7.7 thou/uL    Lymphocytes Absolute 1.1 0.8 - 4.4 thou/uL    Monocytes Absolute 0.7 0.0 - 0.9 thou/uL    Eosinophils Absolute 0.3 0.0 - 0.4 thou/uL    Basophils Absolute 0.1 0.0 - 0.2 thou/uL    Immature Granulocyte Absolute 0.0 <=0.0 thou/uL     Results from last 7 days   Lab Units 06/30/21  0534 06/29/21  2351 06/29/21  1707 06/29/21  1234 06/29/21  0526 06/28/21  2341 06/28/21  1741 06/28/21  1156 06/28/21  0527 06/27/21  2349   LN-POC GLUCOSE FINGERSTICK- HE mg/dL 148* 125 105 126 142* 111 104 125 117 126           Advance Care Planning:   Barriers to discharge: Multiple issues  Anticipated discharge day: Few days  Disposition: Depending on PT OT      Lori Jameson MD  [facility]  Hospitalist

## 2021-07-04 NOTE — PROGRESS NOTES
[facility] Daily Progress Note      Date of Service: 6/29/2021     Assessment and plan    Increase tracheal secretion  : Ongoing for weeks  ; Pulmonary on board  ; Sputum culture growing Pseudomonas  : Started on tobramycin from 6/26/2021  ; No leukocytosis  ; Repeat chest x-ray today did not show any infiltrate  : Pulmonary note reviewed: Bronchoscopy being considered    Acute metabolic encephalopathy  Considered multifactorial  ; Needing restraints  ; CT head on 6/14 show some possible increase in ventricular size however repeat CT on 6/16 did not show any acute changes  ; Was seen by neurology 6/18  ; Mental status remained same: Awake tracking      Recent subarachnoid hemorrhage:   Presented with fall and unresponsiveness on 5/15/2021.  S/p EVD, angiogram showing ruptured posterior communicating aneurysm followed by craniotomy with clipping of PCOM aneurysm on the same day.  EVD removed on 6/9/2021.    Postoperative period was complicated by vasospasm, bilateral BAYLEE infarct. MRI done on 5/28 showed right frontal parietal, temopral and left parasagittal parietal lobes infarcts.    On 6/1/2021, angiogram showed no vasospasm.   Will need to follow up with Dr. Martinez in Neurosurgery clinic in 3 months with repeat CTA of head and neck for post-operative follow up    Hypernatremia: due to dehydration.  : Resolved  ; Sodium 143-141 139  ; Sodium 139  on 6/28/2021    Leukocytosis  Resolved     Acute hypoxic respiratory failure:   Initially postoperatively but later complicated by pneumonia.  On mechanical ventilation, tracheostomy done on 6/7/2021.    Vent management per RT and pulmonology.     Hospital acquired pneumonia: Sputum cultures from prior hospitalization showed Klebsiella, Pseudomonas.  Patient has been on antibiotics since 5/20/2021.  On 6/5/2021, was sputum grew ESBL for which patient is on meropenem, until 6/19/2021.    Left inguinal hernia  ; Monitor for signs of any obstruction  ; Outpatient  evaluation       Oropharyngeal dysphagia:   On tube feeding via PEG tube, dietitian, speech consult. Video swallow 6/14     Hypertension: On lisinopril     Moderate malnutrition     Hypokalemia - likely secondary to GI loss - resolved.  -On K protocol, RN to manage         This note was dictated using voice recognition software. Any grammatical or context distortions are unintentional and inherent to the software.  Subjective:   Patient seen at bedside with respiratory therapist  Patient had just undergone suctioning    Patient appears awake, looks tired but tracking    Updates from nursing staff      Brief History: 70 y.o. old male with past medical history of hypertension who presented to ED on 5/15/2021 for altered mental status. He had a fall and was found unresponsive by his wife. He was found to have acute subarachnoid hemorrhage.  On 5/15, he underwent left external ventricular drain placement.  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 One the same day, he also underwent craniotomy with clipping of PCOM aneurysm.  On 5/28/2021, patient had persistently elevated TCD, CT head showed bilateral BAYLEE infarct.  Next day, for persistent TCD and CTA showing bilateral BAYLEE vasospasm, was treated with milrinone and verapamil.  On 6/1/2021, angiogram showed no evidence of vasospasm.  He was extubated on 6/3/2021.On 6/6/2021, patient was reintubated due to drop in his saturation.  On 6/7/2021, patient underwent tracheostomy and PEG tube placement.  On 6/9/2021, EVD was removed.  Patient was initially started on Unasyn for possible pneumonia on 5/20/2021 which was switched to ceftriaxone the next day for sputum culture growing Klebsiella.  On 5/23, sputum culture also grew Pseudomonas for which ceftriaxone was switched to Zosyn.  Due to increased white blood cell counts, possibility of ventriculitis was raised however CSF was negative but antibiotic was switched to cefepime and vancomycin on 5/28/2021  "with plan to continue for 2 weeks.  Vanco has been discontinued on 6/9/2021.  On 6/5/2021, sputum grew ESBL so cefepime was switched to meropenem which he is on currently.   He was [facility] to [facility] on 6/11/21.     6/14/21 CT head done for fatigue- read as slight increase in caliber of lateral and third ventricle  With possibility of developing communicating hydrocephalus. Discussed with Dr. Casillas (neurosurgeon at [facility]), who did not think there was much change, however suggested repeating CT head on 6/16/21 which did not show any change.    Started on tobramycin inhaler from 6/26/2021    Chart reviewed, events noted. Pt seen and examined.     Objective     Vital signs in last 24 hours:  Temp:  [97.5  F (36.4  C)-98.8  F (37.1  C)] 98.8  F (37.1  C)  Heart Rate:  [77-85] 85  Resp:  [17-26] 21  BP: (104-132)/(67-89) 120/89  FiO2 (%):  [24 %-30 %] 24 %  Weight:   137 lb 9.6 oz (62.4 kg)  Weight change: -14.4 oz (-0.408 kg)  Body mass index is 19.19 kg/m .    Intake/Output last 3 shifts:  I/O last 3 completed shifts:  In: 3902 [NG/GT:3902]  Out: 1775 [Urine:1775]  Intake/Output this shift:  I/O this shift:  In: -   Out: 800 [Urine:800]      Physical Exam:  /89 (Patient Position: Lying)   Pulse 85   Temp 98.8  F (37.1  C) (Axillary)   Resp 21   Ht 5' 11\" (1.803 m)   Wt 137 lb 9.6 oz (62.4 kg)   SpO2 94%   BMI 19.19 kg/m      FiO2 (%): 24 % O2 Device: Ventilator O2 Flow Rate (L/min): 20 L/min    General Appearance:   Lying in bed, eyes open, appears to be tracking   HEENT:    Normocephalic. Pupils equal and reactive. No scleral   Icterus.  Healed surgical scar on scalp   Lungs:     Clear to auscultation bilaterally, respirations unlabored  Less conducted sounds today   Heart::    Regular rate and rhythm, S1 and S2 normal, no murmur, rub   or gallop. No peripheral edema.   Abdomen:   Soft, Non tender. Non distended. Bowel sounds present.  G-tube in place   Extremity :  Left inguinal hernia   Pulses:   2+ " and symmetric all extremities   CNS:  Awake, tracks eyes  No clear facial asymmetry  Spontaneous upper limb movements  No clear movement of lower limbs noticed         Scheduled Meds:    famotidine  20 mg Enteral Tube BID     guar gum  1 packet Enteral Tube TID     heparin (PF) ANTICOAGULANT  5,000 Units Subcutaneous Q8H FIXED TIMES     insulin aspart (NovoLOG) injection   Subcutaneous Q6H FIXED TIMES     ipratropium-albuteroL  3 mL Nebulization Q6H - RT     [Held by provider] lisinopriL  20 mg Enteral Tube Daily     menthol-zinc oxide   Topical TID     sodium chloride  3 mL Nebulization BID     tobramycin  80 mg Nebulization Q8H - RT     Continuous Infusions:  PRN Meds:.acetaminophen **OR** acetaminophen, alteplase, bisacodyL, dextrose 50 % (D50W), docusate sodium, glucagon (human recombinant), hydrALAZINE, HYDROmorphone, magnesium hydroxide, naloxone **OR** naloxone **OR** naloxone **OR** naloxone, polyethylene glycol, sodium chloride 0.9%, sodium chloride    Lab Results:  personally reviewed.   not applicable  Recent Results (from the past 24 hour(s))   POCT Glucose    Collection Time: 06/28/21 11:56 AM    Specimen: Blood   Result Value Ref Range    Glucose 125 70 - 139 mg/dL   Basic Metabolic Panel    Collection Time: 06/28/21  2:52 PM   Result Value Ref Range    Sodium 139 136 - 145 mmol/L    Potassium 4.4 3.5 - 5.0 mmol/L    Chloride 101 98 - 107 mmol/L    CO2 28 22 - 31 mmol/L    Anion Gap, Calculation 10 5 - 18 mmol/L    Glucose 141 (H) 70 - 125 mg/dL    Calcium 10.3 8.5 - 10.5 mg/dL    BUN 37 (H) 8 - 22 mg/dL    Creatinine 0.65 (L) 0.70 - 1.30 mg/dL    GFR MDRD Af Amer >60 >60 mL/min/1.73m2    GFR MDRD Non Af Amer >60 >60 mL/min/1.73m2   POCT Glucose    Collection Time: 06/28/21  5:41 PM    Specimen: Blood   Result Value Ref Range    Glucose 104 70 - 139 mg/dL   POCT Glucose    Collection Time: 06/28/21 11:41 PM    Specimen: Blood   Result Value Ref Range    Glucose 111 70 - 139 mg/dL   POCT Glucose     Collection Time: 06/29/21  5:26 AM    Specimen: Blood   Result Value Ref Range    Glucose 142 (H) 70 - 139 mg/dL     Results from last 7 days   Lab Units 06/29/21  0526 06/28/21  2341 06/28/21  1741 06/28/21  1156 06/28/21  0527 06/27/21  2349 06/27/21  1747 06/27/21  1141 06/27/21  0519 06/26/21  2350   LN-POC GLUCOSE FINGERSTICK- HE mg/dL 142* 111 104 125 117 126 104 126 113 123           Advance Care Planning:   Barriers to discharge: Multiple issues  Anticipated discharge day: Few days  Disposition: Depending on PT OT      Lori Jameson MD  [facility]  Hospitalist    PROVIDER RESTRAINT FOR NON-VIOLENT BEHAVIOR FACE TO FACE EVALUATION     Patient's Immediate Situation:  Patient demonstrated the following behaviors: Pulling/tugging at invasive lines or tubes and does not respond to verbal/non-verbal redirection     Patient's Reaction to the intervention:  Does patient understand the reason for restraint/seclusion? No     Medical Condition:  Is there any evidence of compromise of Skin integrity, Respiratory, Cardiovascular, Musculoskeletal, Hydration? No     Behavioral Condition:  In consultation with the RN, is there a need to continue this restraint or seclusion? Yes     See Restraint Flowsheet for complete restraint documentation and assessment.     IAN Castaneda

## 2021-07-04 NOTE — PROGRESS NOTES
Progress Notes by Trino Sandoval MBBS at 6/30/2021 11:27 AM     Author: Trino Sandoval MBBS Service: Hospitalist Author Type: Physician    Filed: 6/30/2021 11:28 AM Date of Service: 6/30/2021 11:27 AM Status: Signed    : Trino Sandoval MBBS (Physician)       PROVIDER RESTRAINT FOR NON-VIOLENT BEHAVIOR FACE TO FACE EVALUATION    Patient's Immediate Situation:  Patient demonstrated the following behaviors: Pulling/tugging at invasive lines or tubes and does not respond to verbal/non-verbal redirection    Patient's Reaction to the intervention:  Does patient understand the reason for restraint/seclusion? No    Medical Condition:  Is there any evidence of compromise of Skin integrity, Respiratory, Cardiovascular, Musculoskeletal, Hydration? No    Behavioral Condition:  In consultation with the RN, is there a need to continue this restraint or seclusion? Yes   Discussed with RN: We will try hand mitten today, otherwise may need to go back to restraint    See Restraint Flowsheet for complete restraint documentation and assessment.    IAN Castaneda

## 2021-07-04 NOTE — PROGRESS NOTES
Progress Notes by Maximus Mathis CNP at 7/1/2021 12:51 PM     Author: Maximus Mathis CNP Service: Palliative Care Author Type: Nurse Practitioner    Filed: 7/1/2021  1:53 PM Date of Service: 7/1/2021 12:51 PM Status: Addendum    : Maximus Mathis CNP (Nurse Practitioner)    Related Notes: Original Note by Maximus Mathis CNP (Nurse Practitioner) filed at 7/1/2021  1:47 PM       [facility] - Palliative Care Progress Note    PATIENT NAME: Dayron Neri  DATE OF ADMISSION: 6/11/2021   Today the patient was seen for: Symptom management  Goals of care  Patient/family support       Impressions & Recommendations     Symptom management  # Pain: Denies.  Pain scores 0  - Tylenol PRN  - Discontinue IV Dilaudid  - Enteral oxycodone 5mg q6h PRN    # Dyspnea: Denies.  Remains on nocturnal vent with TM weans during the day.  On doris nebs for pseudomonas PNA.   - bronchial hygiene per pulm team  - consider switching nebs to albuterol only to avoid anticholinergics in setting of encephalopathy       # Constipation: last BM 6/30  - Bowel regimen    # Fatigue: in setting of acute illness   - PT/OT, OOB    # Dysphagia s/p G tube.  Tolerating speaking valve today    # Encephalopathy: in setting of SAH s/p craniotomy and clipping P-comm ruptured aneurysm, and b/l infarct.  EEG neg for seizures.  Repeat CT today with no acute changes.  Slow progression  - Avoid benzodiazepines, antihistamines, anticholinergics if able  - Lights on and blinds open during the day.  Reorient frequently  - Lights & TV off during the night.  Promote normal circadian rhythm  - Limit sensory deprivation - utilize hearing aids, glasses, etc  - Frequently assess unmet bathroom needs if applicable.    - Restraints PRN for safet  - HMS considering adding methylphenidate. To get input from neuro and NSG.  If consider starting, recommend starting 5mg at 0800 and noon.  If no effect in 2 days, may double dose; if no improvement after 7  days, discontinue    # Anxiety: denies  # Nausea: denies    Psychosocial/spiritual support: lives with wife.  Has a daughter.  Patient is [employer].      Palliative SW following    Goals of care    Restorative with hopes to eventually get back home    Code status: Full Code    Advanced Care Planning    Patient has a completed Health Care Directive: No    Surrogate decision maker if no appointed agent: wife Susy Montano          70 yoM with PMH HTN admitted 5/15/21  for AMS and a fall. Found to have acute subarachnoid hemorrhage and underwent left external ventricular drain placement.  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  Course complicated by CT head showing bilateral BAYLEE infarct.  Extubated 5/16, re-intubated 5/19, extubated again 6/3/2021.  Re-intubated again 6/6/2021 d/t hypoxia.  S/p trach/PEG on 6/7/2021, EVD removed 6/9/2021.  Admitted to LTACH 6/11.              Palliative encounter:     I visited patient at bedside.  Wife present on iPad at bedside.  Patient oriented to first name and month of birth.  Able to answer yes and no to some symptom questions and follow some simple commands.  Able to phonate with speaking valve on, voice was soft.  He was drifting in/out sleep during visit.  Wife had no current questions or concerns but expressed her overall frustration of his slow progression.  She is quite concerned about his overall neurologic prognosis.  She will be coming in to visit him this weekend.  She appreciates our conversations and welcomes continues support from our Palliative SW Josephine.        Prognosis, Goals, or Advance Care Planning was addressed today with: Yes.  Mood, coping, and/or meaning in the context of serious illness were addressed today: Yes.           Review of Systems:     A full 14 point review of systems was otherwise completed and is negative aside from that mentioned above.          Medications:     I have reviewed  this patient's medication profile and medications during this hospitalization.    ? famotidine  20 mg Enteral Tube BID   ? guar gum  1 packet Enteral Tube TID   ? heparin (PF) ANTICOAGULANT  5,000 Units Subcutaneous Q8H FIXED TIMES   ? ipratropium-albuteroL  3 mL Nebulization Q6H - RT   ? [Held by provider] lisinopriL  20 mg Enteral Tube Daily   ? menthol-zinc oxide   Topical TID   ? sodium chloride  3 mL Nebulization BID   ? tobramycin  80 mg Nebulization Q8H - RT        PRN MEDS:   acetaminophen **OR** acetaminophen, alteplase, bisacodyL, dextrose 50 % (D50W), docusate sodium, glucagon (human recombinant), hydrALAZINE, HYDROmorphone, magnesium hydroxide, naloxone **OR** naloxone **OR** naloxone **OR** naloxone, polyethylene glycol, sodium chloride 0.9%, sodium chloride          Objective:   Vital Last Value 24 Hour Range (min/max)    Temp  Min: 97.6  F (36.4  C)  Max: 98.6  F (37  C)    Pulse  Min: 80  Max: 97    Resp  Min: 17  Max: 28    BP  Min: 116/66  Max: 134/89    SpO2  Min: 94 %  Max: 99 %      PHYSICAL EXAMINATION:   General Appearance: lying in bed, b/l mitts on  HEENT: Normocephalic, atraumatic.  Lips, mucosa, and tongue moist  Resp: CTA anterolaterally; non-labored, on trach dome/PMV   CV:  RRR; mild ROSE  Abdomen: Soft, nontender, bowel sounds active  Extremities: Normal, atraumatic, able to move all   Skin: Warm and dry, no rashes.   Neurologic: alert to voice, tracks, follows commands, lethargic    Psych:  Calm            Data Reviewed:     Reviewed recent pertinent imaging, comments:   Ct Head Without Contrast    Result Date: 7/1/2021  EXAM: CT HEAD WO CONTRAST LOCATION: [facility] DATE/TIME: 7/1/2021 11:06 AM INDICATION: Altered mental status not improving. COMPARISON: 06/16/2021 head CT. TECHNIQUE: Routine CT Head without IV contrast. Multiplanar reformats. Dose reduction techniques were used. FINDINGS: INTRACRANIAL CONTENTS: Since prior study, temporal evolution of  multicompartment intracranial hemorrhage with near complete resolution of layering hemorrhage within the lateral ventricles and of extra-axial fluid deep to the right frontal craniotomy site. Some dural thickening and/or hyperdense blood products persist extending to the right middle cranial fossa as before. No new or progressive hemorrhage. Low-density changes with sulcal effacement/mass effect redemonstrated in the right frontal lobe.  Suggested area of intraparenchymal hemorrhage on previous study appears to have resolved. The degree of mass effect overall is similar to prior study with overall stable/unchanged ventricular caliber, including dilation of the lateral and third ventricles. Volume loss in the right middle cranial fossa/right temporal lobe redemonstrated. Probable evolving subacute phase nonhemorrhagic ischemia in the right frontal lobe and paramedian posterior left frontal level. No definite new areas of acute ischemic involvement are identified. Underlying chronic ischemic changes are identified in the deep white matter of both cerebral hemispheres estimated mild to moderate. Position of cerebellar tonsils is satisfactory. Sella appears unremarkable. VISUALIZED ORBITS/SINUSES/MASTOIDS: No acute orbital process. Mild membrane thickening in the sphenoid sinus. No middle ear or mastoid effusion. BONES/SOFT TISSUES: Postoperative changes of right frontotemporal craniotomy. Right parasellar aneurysm clip. Mild motion artifact degrades image quality. No significant swelling of the facial or scalp tissues is evident.     1.  Temporal evolution and subtotal resolution of previously identified multicompartment hyperdense blood products. Specifically, subtotal resolution of intraparenchymal hemorrhage deep white matter right frontal lobe, intraventricular hemorrhage and of extra-axial blood products overlying the right frontotemporal level deep to the craniotomy site. No new or progressive hemorrhage is  identified 2.  Temporal evolution of presumed subacute phase nonhemorrhagic ischemic change in the right frontal lobe and posterior left frontal paramedian region. No new or progressive areas of recent ischemia are suggested. 3.  Stable ventricular caliber. 4.  Additional details above.    For a range for imaging in the last 24 hours    Reviewed recent labs, comments:   Lab Results:  personally reviewed  Lab Results   Component Value Date     06/30/2021    K 4.4 06/30/2021     06/30/2021    CO2 30 06/30/2021    BUN 27 (H) 06/30/2021    CREATININE 0.61 (L) 06/30/2021    CALCIUM 10.4 06/30/2021     Lab Results   Component Value Date    WBC 7.7 06/30/2021    HGB 9.7 (L) 06/30/2021    HCT 29.8 (L) 06/30/2021    MCV 95 06/30/2021     06/30/2021     Lab Results   Component Value Date/Time    ALBUMIN 2.1 (L) 06/14/2021 02:50 PM     Wt Readings from Last 3 Encounters:   07/01/21 140 lb 6.4 oz (63.7 kg)         Information gathered today from: patient, family/loved ones, medical team members, unit team members and chart review in Epic     TTS: I have personally spent a total of 35 minutes  today on the unit in review of medical record, consultation with the medical providers and assessment of patient today, with more than 50% of this time spent in counseling, coordination of care, and conversation patient, wife re: prognosis, symptom management, emotional support and development of plan of care     Maximus Mathis, TRISTAN  Palliative Medicine  [facility]  Pager [phone]      During regular M-F work hours -- if you are not sure who specifically to contact -- please contact us by calling [phone].

## 2021-07-04 NOTE — PROGRESS NOTES
NEUROLOGY PROGRESS NOTE     ASSESSMENT & PLAN   Hospital Day#: 17    Visit diagnosis: Encephalopathy, SAH, BAYLEE stroke.     70-year-old man who presented on May 15 after being found unresponsive by his wife.  Evaluation revealed an acute subarachnoid hemorrhage, so EVD drain was placed.  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  He developed elevated velocities on his cranial dopplers and he had anterior cerebral artery infarcts bilaterally.  He failed extubation and had to be reintubated and then he had trach and PEG placement.  EVD drain was removed and then he was transferred to [facility] for further management.  Last week he appeared a little more alert on neuro exam.  No focal deficits.  There has been concern for sundowning/encephalopathy versus toxic metabolic encephalopathy..    EEG was completed on 6.25 and was negative for seizure.  Head CT on 6.15  showed stable ventricle size.  He also showed an evolving infarct in the right anterior paramedian lobe.  Small amount of petechial blood.  There is also a left frontal lobe subacute infarct on the imaging.  Clinically, patient is minimally responsive to voice and touch.  Wife reports fluctuations in his mentation.   Consider repeat head CT.  Neurology will continue to follow intermittently.    Neurology Discharge Planning :   TBD    Vandana Hernandez MD  [facility]  (Previously, [facility]  Tel: [phone]     PROBLEM LIST      Patient Active Problem List    Diagnosis Date Noted     Pseudomonas aeruginosa infection      Acute tracheobronchitis      Metabolic encephalopathy      Idiopathic hypertension      Oropharyngeal dysphagia      Moderate protein-calorie malnutrition (H)      Encephalopathy      Encounter for palliative care      Tracheostomy care (H)      Acute and chronic respiratory failure with hypoxia (H)      On mechanically assisted  ventilation (H)      ESBL (extended spectrum beta-lactamase) producing bacteria infection      Acute respiratory failure with hypoxia (H)      SAH (subarachnoid hemorrhage) (H) 06/11/2021     Past Medical History:   Patient  has no past medical history on file.     SUBJECTIVE     Patient's wife communicates via ipad in the room. She says Dayron's mentation and speech fluctuate. He falls asleep a few minutes after being more alert. She is concerned about weight loss.      OBJECTIVE     Vital signs in last 24 hours  Temp:  [98.4  F (36.9  C)-98.9  F (37.2  C)] 98.9  F (37.2  C)  Heart Rate:  [80-91] 84  Resp:  [20-33] 27  BP: (109-125)/(65-77) 125/65  FiO2 (%):  [24 %-30 %] 30 %    Weight:   139 lb 1.6 oz (63.1 kg)    I/O last 24 hours    Intake/Output Summary (Last 24 hours) at 6/28/2021 1354  Last data filed at 6/28/2021 1238  Gross per 24 hour   Intake 3830 ml   Output 2175 ml   Net 1655 ml       Review of Systems   Pertinent items are noted in HPI.    General Physical Exam: Patient is ill in appearance. Trach in place, some coughing spontaneously. No Carotid bruit, thyromegaly, JVD or lymphadenopathy noted.  Neurological Exam:  Mental status: Wakes to stimulation. Not clearly following commands.   Speech: None.   Cranial nerves:  PERRL. Face appears symmetric.   Motor:  strength intact.   Sensory: Unable to test due to patient's level of mentation. Appears to  with stimulation.   Coordination: Unable to test due to patient's level of mentation.  Gait: Unable to test due to safety.      DIAGNOSTIC STUDIES     Pertinent Radiology   Radiology Results: Personally reviewed image/s    CT  Head (6.15.21):  IMPRESSION:   1.  No significant change in the size of the mildly enlarged lateral and third ventricles from CT 06/14/2021. Slight decrease in the intraventricular hemorrhage in the occipital horns in the interval.  2.  Again seen is a broad area of evolving infarction involving the anterior paramedian right  frontal lobe similar in size to the CT 06/14/2021. A small amount of petechial blood products have developed in the central aspect of this infarct since   06/14/2021. No space-occupying hemorrhage. Continued follow-up recommended.  3.  Stable small subacute infarct along the paramedian superior left frontal lobe posteriorly.  4.  Again seen are low density changes associated with a previous left frontal approach ventricular catheter track with decrease in the density of blood products along the catheter track in the interval.  5.  Slight decrease in the scattered subarachnoid hemorrhage in the cerebral sulci in the interval. No definite new subarachnoid hemorrhage.  6.  Stable postop changes right frontotemporal craniotomy with aneurysm clip in the right parasellar region with stable mixed density extra-axial fluid and blood products deep to the craniectomy site.    Pertinent Labs   Lab Results: personally reviewed.   Recent Results (from the past 24 hour(s))   POCT Glucose    Collection Time: 06/27/21  5:47 PM    Specimen: Blood   Result Value Ref Range    Glucose 104 70 - 139 mg/dL   POCT Glucose    Collection Time: 06/27/21 11:49 PM    Specimen: Blood   Result Value Ref Range    Glucose 126 70 - 139 mg/dL   POCT Glucose    Collection Time: 06/28/21  5:27 AM    Specimen: Blood   Result Value Ref Range    Glucose 117 70 - 139 mg/dL   Potassium - Next AM    Collection Time: 06/28/21  6:31 AM   Result Value Ref Range    Potassium 4.7 3.5 - 5.0 mmol/L   HM1 (CBC with Diff)    Collection Time: 06/28/21  6:31 AM   Result Value Ref Range    WBC 7.3 4.0 - 11.0 thou/uL    RBC 3.10 (L) 4.40 - 6.20 mill/uL    Hemoglobin 9.6 (L) 14.0 - 18.0 g/dL    Hematocrit 29.8 (L) 40.0 - 54.0 %    MCV 96 80 - 100 fL    MCH 31.0 27.0 - 34.0 pg    MCHC 32.2 32.0 - 36.0 g/dL    RDW 14.6 (H) 11.0 - 14.5 %    Platelets 424 140 - 440 thou/uL    MPV 9.9 8.5 - 12.5 fL    Neutrophils % 73 (H) 50 - 70 %    Lymphocytes % 15 (L) 20 - 40 %     Monocytes % 8 2 - 10 %    Eosinophils % 2 0 - 6 %    Basophils % 1 0 - 2 %    Immature Granulocyte % 1 (H) <=0 %    Neutrophils Absolute 5.3 2.0 - 7.7 thou/uL    Lymphocytes Absolute 1.1 0.8 - 4.4 thou/uL    Monocytes Absolute 0.6 0.0 - 0.9 thou/uL    Eosinophils Absolute 0.2 0.0 - 0.4 thou/uL    Basophils Absolute 0.1 0.0 - 0.2 thou/uL    Immature Granulocyte Absolute 0.1 (H) <=0.0 thou/uL   POCT Glucose    Collection Time: 06/28/21 11:56 AM    Specimen: Blood   Result Value Ref Range    Glucose 125 70 - 139 mg/dL         HOSPITAL MEDICATIONS       amino acids-protein hydrolys  1 packet Enteral Tube DAILY     famotidine  20 mg Enteral Tube BID     guar gum  1 packet Enteral Tube TID     heparin (PF) ANTICOAGULANT  5,000 Units Subcutaneous Q8H FIXED TIMES     insulin aspart (NovoLOG) injection   Subcutaneous Q6H FIXED TIMES     ipratropium-albuteroL  3 mL Nebulization Q6H - RT     [Held by provider] lisinopriL  20 mg Enteral Tube Daily     menthol-zinc oxide   Topical TID     sodium chloride  3 mL Nebulization BID     tobramycin  80 mg Nebulization Q8H - RT        Total time spent for face to face visit, reviewing labs/imaging studies, counseling and coordination of care was: 45 Minutes More than 50% of this time was spent on counseling and coordination of care.      This note was dictated using voice recognition software.  Any grammatical or context distortions are unintentional and inherent to the software.

## 2021-07-04 NOTE — PLAN OF CARE
"Plan of Care by Carolann Solano LRT at 7/4/2021  4:13 AM     Author: Carolann Solano LRT Service: -- Author Type: Respiratory Therapist    Filed: 7/4/2021  4:24 AM Date of Service: 7/4/2021  4:13 AM Status: Signed    : Carolann Solano LRT (Respiratory Therapist)         Problem: Mechanical Ventilation  Goal: Patient will maintain patent airway  Outcome: Progressing  Goal: Tracheostomy will be managed safely  Outcome: Progressing   RT PROGRESS NOTE     DATA:     CURRENT SETTINGS: Vent Mode: PRVC A/C  FiO2 (%):  [30 %-40 %] 30 %  S RR:  [14] 14  S VT:  [450 mL] 450 mL  PEEP/CPAP (cm H2O):  [5 cm H2O] 5 cm H2O  Minute Ventilation (L/min):  [7.1 L/min-17.4 L/min] 10.8 L/min  PIP:  [15 cm H2O-21 cm H2O] 21 cm H2O  MAP (cm H2O):  [8] 8               TRACH TYPE / SIZE:  #6 Shiley             MODE:   PRVC/AC             FIO2:   30%     ACTION:             THERAPIES:   DuoNeb X 2, Sodium X 1 and RUTH X 1             SUCTION:                           FREQUENCY:   X 5                        AMOUNT:   copious to large                         CONSISTENCY:   thick/tenacious                         COLOR:   white and white yellow             SPONTANEOUS COUGH EFFORT/STRENGTH OF EFFORT (not elicited by suctioning): weak.                              WEANING PHASE:   2                        WEAN MODE:    HFTM                        WEAN TIME:   13 hours and 6 minutes.                         END WEAN REASON:   vent at night.      RESPONSE:             BS:   coarse/rhonchi             VITAL SIGNS:   Blood pressure 129/83, pulse 86, temperature 98.7  F (37.1  C), temperature source Axillary, resp. rate 22, height 5' 11\" (1.803 m), weight 139 lb 7 oz (63.2 kg), SpO2 97 %.               EMOTIONAL NEEDS / CONCERNS:                  RISK FOR SELF DECANNULATION:                          RISK DUE TO:                          INTERVENTION/S IN PLACE IS/ARE:         NOTE / PLAN:   continue current orders and monitoring.          "

## 2021-07-04 NOTE — PLAN OF CARE
Plan of Care by Manuelito Liu OT at 6/30/2021  5:46 PM     Author: Manuelito Liu OT Service: -- Author Type: Occupational Therapist    Filed: 6/30/2021  5:46 PM Date of Service: 6/30/2021  5:46 PM Status: Signed    : Manuelito Liu OT (Occupational Therapist)         Problem: Occupational Therapy  Goal: OT Goals  Description: Patient will demonstrate the following by 7/30/21, in order to maximize independence with ADL/IADL performance:      -Demonstrate safety with Bed/Chair/Toilet transfer with mod A   -Complete UB dressing with min A,caryn tech as approp   -Participate in grooming/hygiene tasks with CGA in sitting  -Pt to demo bed mobility with max A  -Pt to tolerate sitting EOB w/max A 8 minutes   -Patient will participate in further cognitive testing in order to assist with safe discharge planning  -Patient will participate in further visual assessing  in order to assist with safe discharge planning   -Patient will participate in 15 minutes of UE Exercise/activity to increase activity tolerance for daily tasks/trf   -Pt to attend to therapy task 15 minute w/min A-vc for ease of ADL completion.     Goals reviewed and remain appropriate on 6/17/2021 by Dahiana Connelly OT  Goals reviewed/remain approp on 6/30/21 by Manuelito Liu OTRL/ASHISH        Outcome: Not Progressing

## 2021-07-04 NOTE — PROGRESS NOTES
Progress Notes by Cristóbal Gann MD at 7/4/2021 10:49 AM     Author: Cristóbal Gann MD Service: Hospitalist Author Type: Physician    Filed: 7/4/2021 10:49 AM Date of Service: 7/4/2021 10:49 AM Status: Signed    : Cristóbal Gann MD (Physician)       PROVIDER RESTRAINT FOR NON-VIOLENT BEHAVIOR FACE TO FACE EVALUATION    Patient's Immediate Situation:  Patient demonstrated the following behaviors: Pulling/tugging at invasive lines or tubes and does not respond to verbal/non-verbal redirection    Patient's Reaction to the intervention:  Does patient understand the reason for restraint/seclusion? No    Medical Condition:  Is there any evidence of compromise of Skin integrity, Respiratory, Cardiovascular, Musculoskeletal, Hydration? No    Behavioral Condition:  In consultation with the RN, is there a need to continue this restraint or seclusion? Yes   Still trying to touch trach site  Continue mittens    See Restraint Flowsheet for complete restraint documentation and assessment.    Cristóbal Gann MD

## 2021-07-04 NOTE — PLAN OF CARE
Plan of Care by Kayla Small LRT at 7/3/2021  3:05 AM     Author: Kayla Small LRT Service: -- Author Type: Respiratory Therapist    Filed: 7/3/2021  3:06 AM Date of Service: 7/3/2021  3:05 AM Status: Signed    : Kayla Small LRT (Respiratory Therapist)       RT PROGRESS NOTE    DATA  CURRENT SETTINGS:   Vent Mode: PRVC A/C  FiO2 (%):  [30 %-40 %] 30 %  S RR:  [14] 14  S VT:  [450 mL] 450 mL  PEEP/CPAP (cm H2O):  [5 cm H2O] 5 cm H2O  Minute Ventilation (L/min):  [8 L/min-13.2 L/min] 9.8 L/min  PIP:  [11 cm H2O-20 cm H2O] 20 cm H2O  MAP (cm H2O):  [8-10] 9   AIRWAY: #6 Cuffed Shiley     ACTION  THERAPIES: Q6 Duoneb, two times a day NaCl 3%, Q8 Tobramycin  SUCTION:   Frequency - once per 2-3 hours   Amount - moderate to large   Consistency - normal   Color - white/clear   Spontaneous Cough/Effort - good/effective      WEANING:    Wean Phase #2   Wean Mode - HFTD 30 LPM 40%   Wean Time - 7/2 0835 to 1926 (651 Min)    End Wean Reason - Per Written Orders    RESPONSE  BS: Coarse  VITAL SIGNS:   Temp:  [97.4  F (36.3  C)-98.9  F (37.2  C)] 97.4  F (36.3  C)  Heart Rate:  [75-91] 78  Resp:  [16-26] 26  BP: (110-126)/(73-85) 126/81  SpO2:  [92 %-98 %] 94 %  FiO2 (%):  [30 %-40 %] 30 %   EMOTIONAL CONCERNS: None  RISK FOR SELF DECANNULATION: Yes; Mitt Restraints in Use    NOTE -- Patient remains mechanically ventilated overnight per the weaning order. Will return to high flow to [facility] in the morning with speaking valve in line. RT will continue to monitor.     Kayla Small    Problem: Mechanical Ventilation  Goal: Patient will maintain patent airway  Outcome: Progressing  Goal: Tracheostomy will be managed safely  Outcome: Progressing  Goal: Respiratory status - ventilation  Description: Movement of air in and out of the lungs.    Liberate from ventilator  Outcome: Progressing     Problem: Chronic Tracheostomy  Goal: Patient is able to maintain stable respiratory status with long term   tracheostomy  Outcome: Progressing

## 2021-07-04 NOTE — PROGRESS NOTES
Progress Notes by Isael Jane MD at 6/29/2021  2:43 PM     Author: Isael Jane MD Service: Critical Care Medicine Author Type: Physician    Filed: 6/29/2021  2:47 PM Date of Service: 6/29/2021  2:43 PM Status: Signed    : Isael Jane MD (Physician)       Pulmonary Medicine Follow up Note - Ventilator Rounds:    Clinical status discussed today with RN and respiratory therapist..    Chief complaint: Ongoing respiratory failure  Significant change in clinical status during past 24 hours? - increase trach secretions   More awake today. Just did PT.      CURRENT SETTINGS:             TRACH TYPE / SIZE:  6 Shiley placed 6-7-21.              MODE:   PRVC 14/450/+5/24%    Tracheal secretions: overnight: x6, mod x4, large x2  Thick, white to pale/yellow  Per RT today, initially very thick but thinning out as the day went on.    Current phase of ventilator weaning pathway:  Phase 2  HFTM 12:56  Phase 2 HFTM 0.28 FiO2, 10Lpm.   Full vent at night.       Physical exam   Vitals:    06/29/21 0347 06/29/21 0720 06/29/21 0734 06/29/21 0906   BP:   120/89    Patient Position:   Lying    Pulse: 80  85 79   Resp: 26  21 22   Temp:   98.8  F (37.1  C)    TempSrc:   Axillary    SpO2: 95%  94% 95%   Weight:  137 lb 9.6 oz (62.4 kg)     Height:         Gen: cachectic. No distress  HEENT: pale conjunctiva, moist mucosa  Neck: trach midline, dressing c/d/i  Lungs: diminished ant no wheezing or rhonchi.   CV: regular, no murmurs or gallops appreciated  Abdomen: soft, NT, BS wnl  Ext: no edema  Neuro: lethargic arousable, tone is increase throughout    Results from last 7 days   Lab Units 06/28/21  0631   LN-WHITE BLOOD CELL COUNT thou/uL 7.3   LN-HEMOGLOBIN g/dL 9.6*   LN-HEMATOCRIT % 29.8*   LN-PLATELET COUNT thou/uL 424     Results from last 7 days   Lab Units 06/28/21  1452   LN-SODIUM mmol/L 139   LN-POTASSIUM mmol/L 4.4   LN-CHLORIDE mmol/L 101   LN-CO2 mmol/L 28   LN-BLOOD UREA NITROGEN mg/dL 37*   LN-CREATININE mg/dL  0.65*   LN-CALCIUM mg/dL 10.3     Culture 4+ Pseudomonas aeruginosaAbnormal     Reported and sent to [facility] as part of the  Emerging Infections Surveillance Program.          Gram Stain Result  4+ Polymorphonuclear leukocytes      2+ Gram negative bacilli            Resulting Agency: [facility]   Susceptibility     Pseudomonas aeruginosa     SUSAN     Aztreonam >16  Resistant     Cefepime >16  Resistant     Ceftazidime >16  Resistant     Ciprofloxacin 2  Resistant     Gentamicin <=2  Sensitive     Levofloxacin 4  Resistant     Meropenem >8  Resistant     Piperacillin + Tazobactam >64/4  Resistant     Tobramycin <=2  Sensitive         XR CHEST 1 VIEW PORTABLE  LOCATION: [facility]  DATE/TIME: 6/18/2021 4:37 PM     INDICATION: increased secretions  COMPARISON: 06/14/2021, 06/09/2021     IMPRESSION:   Tracheostomy tube in place. Left basilar opacities partially silhouetting the left hemidiaphragm are unchanged and again presumed to reflect some atelectasis. Right lung is clear. Heart and pulmonary vascularity are normal. Gastrostomy tube.    Diagnosis:     Patient is a 69 yo man with history of hypertension. Patient initially presented on 15-May-2021 to [facility] after being found unresponsive by his wife. Patient was then found to have a subarachnoid hemorrhage secondary to a ruptured aneurysm. Patient was intubated and was started on mannitol, IV antihypertensives and hyperventilation and patient was then transferred to [facility]. Patient underwent aneurysm clipping as well as placement of extra-ventricular drain from hydrocephalus on 15-May-2021. Extra-ventricular drain would malfunction and would require replacement on 04-Jun-2021 and 06-Jun-2021. Extra-ventricular drain reportedly was removed on 09-Jun-2021. Patient had intra-arterial vasospasm treatment with verapamil on 28-May-2021.  Patient was extubated on 16-May-2021 but had to be reintubated on  19-May-2021. Patient would be extubated on 03-Jun-2021 but would be reintubated on 06-Jun-2021. Patient had tracheostomy and PEG-tube placed on 07-Jun-2021. Patient required treatment for hospital-acquired pneumonia. Patient was transferred to LT[facility] on 11-Jun-2021.    1. Acute respiratory failure s/p trach 6/7/2021  2. Pseudomonas acute tracheobronchitis started odris nebs  3. Encephalopathy   4. Subarachnoid bleed s/p craniotomy and clipping P-comm rupture aneurysm.   5. HTN  6. Malnourishment   7. Dysphagia s/p G tube    Recommendations/Plans (MDM):  1. Weaning orders: continue phase 2, TM/PMV during the day full vent at night. Resume PMV once secretions improve.   2. Trach change? no  3. Diagnostic testing? no  4. Cont doris neb abx for 8 days, re-evaluate this week and extend to 10-14 days if still having a lot of secretions.   5. Continue pulmonary toiletting scheduled duonebs q6h, HS nebs two times a day  No indication for bronch at this time as seems to be responding to above. If desaturating or evidence of mucus plugging, then we can do a therapeutic suction bronch.   6. DVT prophylaxis: heparin subcutaneous    Discussed extensively with patient's wife and daughter over the iPad.     Isael Jane MD (Avi)  [facility] Pulmonary & Critical Care  Pager [phone]  Clinic [phone]

## 2021-07-04 NOTE — PLAN OF CARE
Problem: Pain  Goal: Patient's pain/discomfort is manageable  Outcome: Progressing     Problem: Daily Care  Goal: Daily care needs are met  Outcome: Progressing   Nos/s of pain; still on restraints. Up in the chair for therapies.

## 2021-07-04 NOTE — PLAN OF CARE
Plan of Care by Amanda Jean RN at 6/29/2021 10:36 PM     Author: Amanda Jean RN Service: -- Author Type: Registered Nurse    Filed: 6/29/2021 10:38 PM Date of Service: 6/29/2021 10:36 PM Status: Signed    : Amanda Jean RN (Registered Nurse)         Problem: Daily Care  Goal: Daily care needs are met  Outcome: Progressing   Patient was calm, cooperative with all cares and repositions, Tube feed tolerated well. No sign of pain noted.

## 2021-07-04 NOTE — PLAN OF CARE
Plan of Care by Yeimi Bradford RN at 7/3/2021 10:48 PM     Author: Yeimi Bradford RN Service: -- Author Type: Registered Nurse    Filed: 7/3/2021 10:51 PM Date of Service: 7/3/2021 10:48 PM Status: Addendum    : Yeimi Bradford RN (Registered Nurse)    Related Notes: Original Note by Yeimi Bradford RN (Registered Nurse) filed at 7/3/2021 10:50 PM         Problem: Pain  Goal: Patient's pain/discomfort is manageable  Outcome: Progressing   No s/s pain.  Problem: Safety  Goal: Patient will be injury free during hospitalization  Outcome: Progressing   Continue on restraint for to protect tubes.    Problem: Daily Care  Goal: Daily care needs are met  Outcome: Progressing   Cares met. Continue on TM. covid test sent per dr corado.

## 2021-07-04 NOTE — H&P
H&P by Cristóbal Gann MD at 7/4/2021  7:22 AM     Author: Cristóbal Gann MD Service: Hospitalist Author Type: Physician    Filed: 7/4/2021 11:08 AM Date of Service: 7/4/2021  7:22 AM Status: Signed    : Cristóbal Gann MD (Physician)          [facility] Hospitalist Daily Progress Note     70 y.o. old male with past medical history of hypertension who presented to ED on 5/15/2021 for altered mental status. He had a fall and was found unresponsive by his wife. He was found to have acute subarachnoid hemorrhage.  On 5/15, he underwent left external ventricular drain placement.  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 One the same day, he also underwent craniotomy with clipping of PCOM aneurysm.  On 5/28/2021, patient had persistently elevated TCD, CT head showed bilateral BAYLEE infarct.  Next day, for persistent TCD and CTA showing bilateral BAYLEE vasospasm, was treated with milrinone and verapamil.  On 6/1/2021, angiogram showed no evidence of vasospasm.  He was extubated on 6/3/2021.On 6/6/2021, patient was reintubated due to drop in his saturation.  On 6/7/2021, patient underwent tracheostomy and PEG tube placement.  On 6/9/2021, EVD was removed.  Patient was initially started on Unasyn for possible pneumonia on 5/20/2021 which was switched to ceftriaxone the next day for sputum culture growing Klebsiella.  On 5/23, sputum culture also grew Pseudomonas for which ceftriaxone was switched to Zosyn.  Due to increased white blood cell counts, possibility of ventriculitis was raised however CSF was negative but antibiotic was switched to cefepime and vancomycin on 5/28/2021 with plan to continue for 2 weeks.  Vanco has been discontinued on 6/9/2021.  On 6/5/2021, sputum grew ESBL so cefepime was switched to meropenem.  He was transferred to [facility] on 6/11/21. Repeat sputum culture 6/19 and he was started on tobramycin nebulization. He has persistent tracheal  secretions but improving.     6/14/21 CT head done for fatigue- read as slight increase in caliber of lateral and third ventricle  With possibility of developing communicating hydrocephalus. Discussed with Dr. Casillas (neurosurgeon at [facility]), who did not think there was much change, however suggested repeating CT head on 6/16/21 which did not show any change.     RT and pulmonary is following and pt is on phase 2 weaning with TM/PMV during day and full vent at night.   Mental status is slowly improving.   He is tolerating tube feeding.    He has severe debility and malnourishment and is progressing slowly and appears malnourishment, weakness is major factor in slow progression and at risk for set backs.    Assessment/Plan      Principal Problem:    SAH (subarachnoid hemorrhage) (H)  Active Problems:    Acute respiratory failure with hypoxia (H)    ESBL (extended spectrum beta-lactamase) producing bacteria infection    Attention to tracheostomy (H)    Acute and chronic respiratory failure with hypoxia (H)    On mechanically assisted ventilation (H)    Encounter for palliative care    Encephalopathy    Metabolic encephalopathy    Idiopathic hypertension    Oropharyngeal dysphagia    Moderate protein-calorie malnutrition (H)    Pseudomonas aeruginosa infection    Acute tracheobronchitis    Cerebrovascular accident (CVA) due to occlusion of cerebral artery (H)    Acute hypoxemic respiratory failure  S/p tracheostomy on 6/7/21, on MV  [facility] next due for change next week 7/7  On phase 2 weaning, TM/PMV during the day and full vent at night  Continue PT/OT, bronchial hygiene  Continue vent weaning per pulmonary    Pseudomonas tracheobronchitis/colonization, Has persistent tracheal secretions but improving,   Continues on tobramycin nebulization for 2 weeks  [facility]    Recent subarachnoid hemorrhage  Presented with fall and unresponsiveness on 5/15/2021.  S/p EVD, angiogram showing  ruptured posterior communicating aneurysm followed by craniotomy with clipping of PCOM aneurysm on the same day.  EVD removed on 6/9/2021.    Postoperative period was complicated by vasospasm, bilateral BAYLEE infarct. MRI done on 5/28 showed right frontal parietal, temopral and left parasagittal parietal lobes infarcts.    On 6/1/2021, angiogram showed no vasospasm.    Will need to follow up with Dr. Martinez in Neurosurgery clinic in 3 months with repeat CTA of head and neck for post-operative follow up    Metabolic encephalopathy: multifactorial  On restraints  CT head 7/1 no new changes  Previous hospitalist discussed with neurology on 7/1/2021    Mental status appears to be improving     Hypertension  On lisinopril     Oropharyngeal dysphagia  On tube feeding via PEG tube, dietitian, speech following.     Moderate protein-calorie malnutrition  On tube feeding  RD following    Left inguinal hernia  Monitoring,   Needs outpatient evaluation    Severe debility, weakness, critical illness, deconditioning, Continue PT/OT    DVT prophylaxis: heparin sq  GI prophylaxis: pepcid     Subjective:     Pt seen along with RN and RT. He was alert and awake, yesterday and saying few words. Was saying good morning to RT.   Little sleepy during my visit, opening eyes but not speaking, not following commands.    ROS    Please see Subjective.      Objective:  Vital signs    Temp:  [98.2  F (36.8  C)-99.2  F (37.3  C)] 98.7  F (37.1  C)  Heart Rate:  [76-92] 86  Resp:  [18-28] 22  BP: (120-129)/(73-83) 129/83  FiO2 (%):  [30 %-40 %] 30 %    Weight    Scheduled Meds:  ? famotidine  20 mg Enteral Tube BID   ? guar gum  1 packet Enteral Tube TID   ? heparin (PF) ANTICOAGULANT  5,000 Units Subcutaneous Q8H FIXED TIMES   ? ipratropium-albuteroL  3 mL Nebulization Q6H - RT   ? [Held by provider] lisinopriL  20 mg Enteral Tube Daily   ? menthol-zinc oxide   Topical TID   ? sodium chloride  3 mL Nebulization BID   ? tobramycin  80 mg  Nebulization Q8H - RT     Continuous Infusions:  PRN Meds:.acetaminophen **OR** acetaminophen, alteplase, bisacodyL, dextrose 50 % (D50W), docusate sodium, glucagon (human recombinant), hydrALAZINE, magnesium hydroxide, naloxone **OR** naloxone **OR** naloxone **OR** naloxone, oxyCODONE, polyethylene glycol, sodium chloride 0.9%, sodium chloride  Intake/Output    I/O last 3 completed shifts:  In: 4180 [NG/GT:4180]  Out: 3275 [Urine:3275]     Physical Exam:  General Appearance: No acute distress, lying in bed ,   HEENT- Healed surgical scar on scalp  Neck- trach+to mask on 41% FIO2  Lungs: Clear to auscultation bilaterally  Cardiovascular: S1S2 present, no murmurs or rubs,  no lower extremity edema  Abdomen: Soft, non distended, bowel sounds present,    Extremities:  no edema, left inguinalhernia  Skin: Warm and dry  Neurologic: awake, not interacting,        LINES and TUBES: fournier, trach, g tube+    Imaging: I have independently reviewed.    Lab Results:  have independently reviewed lab results.     All medication issues identified within the last 24 hours have been addressed and completed as appropriate.     Barriers to disposition:  trach weaning   Expected discharge- multiple days expected    Discussed with nursing, RT      Cristóbal Gann MD  [facility]  Pager [phone]

## 2021-07-05 LAB
ANION GAP SERPL CALCULATED.3IONS-SCNC: 12 MMOL/L (ref 5–18)
BASOPHILS # BLD AUTO: 0 THOU/UL (ref 0–0.2)
BASOPHILS NFR BLD AUTO: 0 % (ref 0–2)
BUN SERPL-MCNC: 28 MG/DL (ref 8–22)
CALCIUM SERPL-MCNC: 10.6 MG/DL (ref 8.5–10.5)
CHLORIDE BLD-SCNC: 99 MMOL/L (ref 98–107)
CO2 SERPL-SCNC: 28 MMOL/L (ref 22–31)
CREAT SERPL-MCNC: 0.67 MG/DL (ref 0.7–1.3)
EOSINOPHIL # BLD AUTO: 0.2 THOU/UL (ref 0–0.4)
EOSINOPHIL NFR BLD AUTO: 2 % (ref 0–6)
ERYTHROCYTE [DISTWIDTH] IN BLOOD BY AUTOMATED COUNT: 14.1 % (ref 11–14.5)
GFR SERPL CREATININE-BSD FRML MDRD: >60 ML/MIN/1.73M2
GLUCOSE BLD-MCNC: 129 MG/DL (ref 70–125)
HCT VFR BLD AUTO: 32.7 % (ref 40–54)
HGB BLD-MCNC: 10.5 G/DL (ref 14–18)
IMM GRANULOCYTES # BLD: 0.1 THOU/UL
IMM GRANULOCYTES NFR BLD: 1 %
LYMPHOCYTES # BLD AUTO: 1.2 THOU/UL (ref 0.8–4.4)
LYMPHOCYTES NFR BLD AUTO: 12 % (ref 20–40)
MCH RBC QN AUTO: 30.8 PG (ref 27–34)
MCHC RBC AUTO-ENTMCNC: 32.1 G/DL (ref 32–36)
MCV RBC AUTO: 96 FL (ref 80–100)
MONOCYTES # BLD AUTO: 0.8 THOU/UL (ref 0–0.9)
MONOCYTES NFR BLD AUTO: 8 % (ref 2–10)
NEUTROPHILS # BLD AUTO: 8.1 THOU/UL (ref 2–7.7)
NEUTROPHILS NFR BLD AUTO: 78 % (ref 50–70)
PLATELET # BLD AUTO: 325 THOU/UL (ref 140–440)
PMV BLD AUTO: 9.9 FL (ref 8.5–12.5)
POTASSIUM BLD-SCNC: 4.5 MMOL/L (ref 3.5–5)
RBC # BLD AUTO: 3.41 MILL/UL (ref 4.4–6.2)
SODIUM SERPL-SCNC: 139 MMOL/L (ref 136–145)
WBC: 10.5 THOU/UL (ref 4–11)

## 2021-07-05 PROCEDURE — 999N001212 HC REV CODE 9999 CHARGE CONVERSION OPNP

## 2021-07-05 PROCEDURE — 94640 AIRWAY INHALATION TREATMENT: CPT

## 2021-07-05 PROCEDURE — 97530 THERAPEUTIC ACTIVITIES: CPT | Mod: GP

## 2021-07-05 PROCEDURE — 94003 VENT MGMT INPAT SUBQ DAY: CPT

## 2021-07-05 PROCEDURE — 97110 THERAPEUTIC EXERCISES: CPT | Mod: GO

## 2021-07-05 PROCEDURE — 92507 TX SP LANG VOICE COMM INDIV: CPT | Mod: GN

## 2021-07-05 PROCEDURE — 85025 COMPLETE CBC W/AUTO DIFF WBC: CPT

## 2021-07-05 PROCEDURE — 0B21XFZ CHANGE TRACHEOSTOMY DEVICE IN TRACHEA, EXTERNAL APPROACH: ICD-10-PCS | Performed by: INTERNAL MEDICINE

## 2021-07-05 PROCEDURE — 36415 COLL VENOUS BLD VENIPUNCTURE: CPT

## 2021-07-05 PROCEDURE — 80048 BASIC METABOLIC PNL TOTAL CA: CPT

## 2021-07-05 ASSESSMENT — MIFFLIN-ST. JEOR: SCORE: 1437.66

## 2021-07-05 NOTE — PLAN OF CARE
"Plan of Care by Esha Jarrett LRT at 7/5/2021  6:35 PM     Author: Esha Jarrett LRT Service: -- Author Type: Respiratory Therapist    Filed: 7/5/2021  6:35 PM Date of Service: 7/5/2021  6:35 PM Status: Addendum    : Esha Jarrett LRT (Respiratory Therapist)    Related Notes: Original Note by Esha Jarrett LRT (Respiratory Therapist) filed at 7/5/2021  5:25 PM         Problem: Mechanical Ventilation  Goal: Patient will maintain patent airway  Outcome: Progressing  Goal: Tracheostomy will be managed safely  Outcome: Progressing  Goal: Respiratory status - ventilation  Description: Movement of air in and out of the lungs.    Liberate from ventilator  Outcome: Progressing   RT PROGRESS NOTE     DATA:     CURRENT SETTINGS:             TRACH TYPE / SIZE:  trach changed to #7 bivona today, no adverse reactions noted.             MODE:   prvc/ac 14, 450, peep 5, 30% at night                ACTION:             THERAPIES:   duo neb q6h, saline two times a day, and tobramycin q8h             SUCTION:                           FREQUENCY:   x8                        AMOUNT:   large to scant                        CONSISTENCY:   thick to normal                        COLOR:   pale yellow to white             SPONTANEOUS COUGH EFFORT/STRENGTH OF EFFORT (not elicited by suctioning): strong                              WEANING PHASE:   2                        WEAN MODE:    tm and pmv                        WEAN TIME:   started at 08:25                           RESPONSE:             BS:   coarse             VITAL SIGNS:   Blood pressure 117/73, pulse 80, temperature 98.9  F (37.2  C), temperature source Axillary, resp. rate 26, height 5' 11\" (1.803 m), weight 142 lb 5 oz (64.6 kg), SpO2 94 %.                 EMOTIONAL NEEDS / CONCERNS:  no                RISK FOR SELF DECANNULATION:  yes                        RISK DUE TO:  confusion                        INTERVENTION/S IN PLACE IS/ARE:  bilateral " hardeep       NOTE / PLAN:   Continue pulmonary toilet.

## 2021-07-05 NOTE — PLAN OF CARE
"Plan of Care by Carolann Solano LRT at 7/5/2021  5:11 AM     Author: Carolann Solano LRT Service: -- Author Type: Respiratory Therapist    Filed: 7/5/2021  5:12 AM Date of Service: 7/5/2021  5:11 AM Status: Signed    : Carolann Solano LRT (Respiratory Therapist)         Problem: Mechanical Ventilation  Goal: Patient will maintain patent airway  Outcome: Progressing  Goal: Tracheostomy will be managed safely  Outcome: Progressing   RT PROGRESS NOTE     DATA:     CURRENT SETTINGS: Vent Mode: PRVC A/C  FiO2 (%):  [30 %-40 %] 30 %  S RR:  [14] 14  S VT:  [450 mL] 450 mL  PEEP/CPAP (cm H2O):  [5 cm H2O] 5 cm H2O  Minute Ventilation (L/min):  [8.4 L/min-11.9 L/min] 11.7 L/min  PIP:  [10 cm H2O-15 cm H2O] 11 cm H2O  MAP (cm H2O):  [4-8] 7               TRACH TYPE / SIZE:  #6 Shiley             MODE:   PRVC/AC             FIO2:   30%     ACTION:             THERAPIES:   DuoNeb X 1  and RUTH X 1             SUCTION:                           FREQUENCY:   X 6                        AMOUNT:   copious to large                         CONSISTENCY:   thick/tenacious                         COLOR:   white and white yellow             SPONTANEOUS COUGH EFFORT/STRENGTH OF EFFORT (not elicited by suctioning): weak.                              WEANING PHASE:   2                        WEAN MODE:    HFTM                        WEAN TIME:   13 hours and 30 minutes. tols well.                        END WEAN REASON:   vent at night.      RESPONSE:             BS:   coarse/rhonchi             VITAL SIGNS:   Blood pressure (!) 135/95, pulse 82, temperature 98.8  F (37.1  C), temperature source Axillary, resp. rate 26, height 5' 11\" (1.803 m), weight 143 lb 11.2 oz (65.2 kg), SpO2 92 %.               EMOTIONAL NEEDS / CONCERNS:                  RISK FOR SELF DECANNULATION:                          RISK DUE TO:                          INTERVENTION/S IN PLACE IS/ARE:         NOTE / PLAN:   continue current orders and monitoring.          "

## 2021-07-05 NOTE — PLAN OF CARE
Plan of Care by Mariam Robles RN at 7/5/2021 12:04 PM     Author: Mariam Robles RN Service: -- Author Type: Registered Nurse    Filed: 7/5/2021  3:33 PM Date of Service: 7/5/2021 12:04 PM Status: Signed    : Mariam Robles RN (Registered Nurse)         Problem: Pain  Goal: Patient's pain/discomfort is manageable  Outcome: Progressing     Problem: Safety  Goal: Patient will be injury free during hospitalization  Outcome: Progressing     Problem: Daily Care  Goal: Daily care needs are met  Outcome: Progressing     Problem: Risk of Injury Due to Unsafe Behavior  Goal: Patient will remain safe while in restraint; physical/psychological needs will be met  Outcome: Progressing     Problem: Potential for Compromised Skin Integrity  Goal: Skin integrity is maintained or improved  Outcome: Progressing     Problem: Urinary Incontinence  Goal: Perineal skin integrity is maintained or improved  Outcome: Progressing     Problem: Potential for Falls  Goal: Patient will remain free of falls  Outcome: Progressing     Pt alert. Vital signs stable. Pt denied pain.  Pt has small mass in left pelvic area, with redness of the skin. Pt reported pain upon palpation.    Provider was informed, and took a  look at it.  Pt's wife and daughter visited.  All csre needs met.

## 2021-07-05 NOTE — PLAN OF CARE
Plan of Care by Sudha Martinez LRT at 7/4/2021 10:30 PM     Author: Sudha Martinez LRT Service: -- Author Type: Respiratory Therapist    Filed: 7/4/2021 10:31 PM Date of Service: 7/4/2021 10:30 PM Status: Signed    : Sudha Martinez LRT (Respiratory Therapist)         Problem: Mechanical Ventilation  Goal: Patient will maintain patent airway  Outcome: Progressing  Goal: Tracheostomy will be managed safely  Outcome: Progressing  Goal: Respiratory status - ventilation  Description: Movement of air in and out of the lungs.    Liberate from ventilator  Outcome: Progressing   RT PROGRESS NOTE   [id number]  DATA:     CURRENT SETTINGS:             TRACH TYPE / SIZE:  6 Shiley DCT, placed on 6/7             MODE:   PRVC 14 450 +5 30%             FIO2:        ACTION:             THERAPIES:   Duoneb q6, Barry q8, Hypertonic SolBID             SUCTION:                           FREQUENCY:   X4 in 4hr for mod to lg white/p-yellow thick                        AMOUNT:                           CONSISTENCY:                           COLOR:                SPONTANEOUS COUGH EFFORT/STRENGTH OF EFFORT (not elicited by suctioning): Yes                              WEANING PHASE: 1                          WEAN MODE:  HFTM/PMV 40%30L for 13.5hr  reji well.   TM trials since 6/20                   WEAN TIME:                           END WEAN REASON:        RESPONSE:             BS: coarse to clr               VITAL SIGNS:                EMOTIONAL NEEDS / CONCERNS:                  RISK FOR SELF DECANNULATION:                          RISK DUE TO:                          INTERVENTION/S IN PLACE IS/ARE:         NOTE / PLAN:     Pt still has lg amt of secr, has strong prod cough.  Cont with Phase2  Cont to monitor closely.

## 2021-07-05 NOTE — PLAN OF CARE
Plan of Care by Margot Sargent RN at 7/4/2021 10:57 PM     Author: Margot Sargent RN Service: -- Author Type: Registered Nurse    Filed: 7/4/2021 11:10 PM Date of Service: 7/4/2021 10:57 PM Status: Signed    : Margot Sargent RN (Registered Nurse)         Problem: Pain  Goal: Patient's pain/discomfort is manageable  Outcome: Progressing     Problem: Safety  Goal: Patient will be injury free during hospitalization  Outcome: Progressing     Problem: Daily Care  Goal: Daily care needs are met  Outcome: Progressing   Vital signs were stable. Pt. does not appear to be in pain and was comfortable for the shift. Pt. is still on restraint hand mitts X 2 for the safety. TF is running at 90 ml/hr. Continue to monitor.  Margot Sargent RN

## 2021-07-05 NOTE — PROGRESS NOTES
Progress Notes by Cristóbal Gann MD at 7/5/2021  7:42 AM     Author: Cristóbal Gann MD Service: Hospitalist Author Type: Physician    Filed: 7/5/2021 12:34 PM Date of Service: 7/5/2021  7:42 AM Status: Signed    : Cristóbal Gann MD (Physician)       PROVIDER RESTRAINT FOR NON-VIOLENT BEHAVIOR FACE TO FACE EVALUATION    Patient's Immediate Situation:  Patient demonstrated the following behaviors: Pulling/tugging at invasive lines or tubes and does not respond to verbal/non-verbal redirection    Patient's Reaction to the intervention:  Does patient understand the reason for restraint/seclusion? No    Medical Condition:  Is there any evidence of compromise of Skin integrity, Respiratory, Cardiovascular, Musculoskeletal, Hydration? No    Behavioral Condition:  In consultation with the RN, is there a need to continue this restraint or seclusion? Yes   Still trying to touch trach site  Continue mittens    See Restraint Flowsheet for complete restraint documentation and assessment.    Cristóbal Gann MD

## 2021-07-05 NOTE — PLAN OF CARE
Plan of Care by Anila Giordano RN at 7/5/2021  6:40 AM     Author: Anila Giordano RN Service: -- Author Type: Registered Nurse    Filed: 7/5/2021  6:41 AM Date of Service: 7/5/2021  6:40 AM Status: Signed    : Anila Giordano RN (Registered Nurse)       Pt had a good night, slept well and appeared comfortable.

## 2021-07-05 NOTE — PROGRESS NOTES
Progress Notes by Katalina Joseph MA, CCC-SLP at 7/5/2021  9:18 AM     Author: Katalina Joseph MA, CCC-SLP Service: -- Author Type: Speech and Language Pathologist    Filed: 7/5/2021  9:27 AM Date of Service: 7/5/2021  9:18 AM Status: Signed    : Katalina Joseph MA, CCC-SLP (Speech and Language Pathologist)       Speech Language/Pathology  Speech Therapy Daily Progress Note    Patient presents as lethargic and cooperative during this session.  An  was not applicable.    Objective    Patient wearing PMV upon arrival, wet vocal quality. Provided oral cares and cued to clear throat. Patient was able to improve clarity of voice following clearance of secretions, assisted with oral suction.    Verbal Expression: To address impaired ability to verbally express wants, needs and ideas impacting meaningful conversation and effective interpersonal interactions.  -Automatic: Patient counted 1-5 x1 with therapist prompts every 1-2 numbers and counted 3-5 an additional time SIMON.   -opposites: NR despite max cues.  -personal information: Patient oriented to birth month, off by one day for date (stated 2nd). Oriented to name. No response to current location despite max cues and F:2 presentation.    Auditory Comprehension:To improve ability to comprehend basic information related to basic needs and environment.  -1-step: patient followed x5 OM commands with verbal and visual cues after several presentations of commands.   -y/n: patient answered x8 personal questions related to person. Reports growing up in [place].    Assessment    Patient demonstrated improved participation and accuracy with therapy tasks. He benefited from continuous cues to maintain alertness throughout session.     Plan/Recommendations  POC, consider repeat BDT this week    The ST Care Plan has been reviewed and current plan remains appropriate.    25 speech/language minutes      Katalina Joseph MA, CCC-SLP

## 2021-07-05 NOTE — PROGRESS NOTES
Progress Notes by Cristóbal Gann MD at 7/5/2021  7:42 AM     Author: Cristóbal Gann MD Service: Hospitalist Author Type: Physician    Filed: 7/5/2021 12:44 PM Date of Service: 7/5/2021  7:42 AM Status: Signed    : Cristóbal Gann MD (Physician)          [facility] Hospitalist Daily Progress Note   Brief summary:  70 y.o. old male with past medical history of hypertension who presented to ED on 5/15/2021 for altered mental status. He had a fall and was found unresponsive by his wife. He was found to have acute subarachnoid hemorrhage.  On 5/15, he underwent left external ventricular drain placement.  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 One the same day, he also underwent craniotomy with clipping of PCOM aneurysm.  On 5/28/2021, patient had persistently elevated TCD, CT head showed bilateral BAYLEE infarct.  Next day, for persistent TCD and CTA showing bilateral BAYLEE vasospasm, was treated with milrinone and verapamil.  On 6/1/2021, angiogram showed no evidence of vasospasm.  He was extubated on 6/3/2021.On 6/6/2021, patient was reintubated due to drop in his saturation.  On 6/7/2021, patient underwent tracheostomy and PEG tube placement.  On 6/9/2021, EVD was removed.  Patient was initially started on Unasyn for possible pneumonia on 5/20/2021 which was switched to ceftriaxone the next day for sputum culture growing Klebsiella.  On 5/23, sputum culture also grew Pseudomonas for which ceftriaxone was switched to Zosyn.  Due to increased white blood cell counts, possibility of ventriculitis was raised however CSF was negative but antibiotic was switched to cefepime and vancomycin on 5/28/2021 with plan to continue for 2 weeks.  Vanco has been discontinued on 6/9/2021.  On 6/5/2021, sputum grew ESBL so cefepime was switched to meropenem.  He was transferred to [facility] on 6/11/21. Repeat sputum culture 6/19 and he was started on tobramycin nebulization. He has  persistent tracheal secretions but improving.     6/14/21 CT head done for fatigue- read as slight increase in caliber of lateral and third ventricle  With possibility of developing communicating hydrocephalus. Discussed with Dr. Casillas (neurosurgeon at [facility]), who did not think there was much change, however suggested repeating CT head on 6/16/21 which did not show any change.     RT and pulmonary is following and pt is on phase 2 weaning with TM/PMV during day and full vent at night. Vent weaning slowed due to poor cough and secretions.  Mental status is slowly improving.   He is tolerating tube feeding.     He has severe debility and malnourishment and is progressing slowly and appears malnourishment, weakness is major factor in slow progression and at risk for set backs.    Assessment/Plan    Principal Problem:    SAH (subarachnoid hemorrhage) (H)  Active Problems:    Acute respiratory failure with hypoxia (H)    ESBL (extended spectrum beta-lactamase) producing bacteria infection    Attention to tracheostomy (H)    Acute and chronic respiratory failure with hypoxia (H)    On mechanically assisted ventilation (H)    Encounter for palliative care    Encephalopathy    Metabolic encephalopathy    Idiopathic hypertension    Oropharyngeal dysphagia    Moderate protein-calorie malnutrition (H)    Pseudomonas aeruginosa infection    Acute tracheobronchitis    Cerebrovascular accident (CVA) due to occlusion of cerebral artery (H)    Acute hypoxemic respiratory failure  S/p tracheostomy on 6/7/21, on MV  On shiley 6, next due for change next week 7/7  On phase 2 weaning, TM/PMV during the day and full vent at night, slow weaning due to poor cough and secretions  Continue PT/OT, bronchial hygiene  Continue vent weaning per pulmonary     Pseudomonas tracheobronchitis/colonization, Has persistent tracheal secretions but improving,   Continues on tobramycin nebulization for 2 weeks  Continue pulmonary toilet, scheduled  justine     Recent subarachnoid hemorrhage  Presented with fall and unresponsiveness on 5/15/2021.  S/p EVD, angiogram showing ruptured posterior communicating aneurysm followed by craniotomy with clipping of PCOM aneurysm on the same day.  EVD removed on 6/9/2021.    Postoperative period was complicated by vasospasm, bilateral BAYLEE infarct. MRI done on 5/28 showed right frontal parietal, temopral and left parasagittal parietal lobes infarcts.    On 6/1/2021, angiogram showed no vasospasm.    Will need to follow up with Dr. Martinez in Neurosurgery clinic in 3 months with repeat CTA of head and neck for post-operative follow up     Metabolic encephalopathy: multifactorial  On restraints  CT head 7/1 no new changes  Previous hospitalist discussed with neurology on 7/1/2021    Mental status appears to be flucutating, he was more interactive on saturday   Family considering ritalin, neurology was ok with ritalin, neurosurgery was contacted and family is waiting to hear from them    Hypertension  On lisinopril     Oropharyngeal dysphagia  On tube feeding via PEG tube, dietitian, speech following.      Inflamed possibly sebaceous cyst, left inguinal area,   Keflex 500 mg three times a day for 3 days  Needs  Outpatient Surgery evaluation for removal - can follow up for both inguinal hernia and cyst as outpatient    Moderate protein-calorie malnutrition  On tube feeding  RD following     Left inguinal hernia  Monitoring,   Needs outpatient evaluation     Severe debility, weakness, critical illness, deconditioning, Continue PT/OT     DVT prophylaxis: heparin sq  GI prophylaxis: pepcid     Subjective:   Pt appears tired, less interactive, incomprehensible words. Was able to tell name to RN today. Wife and daughter at bedside, report pt was more interactive on Saturday. Pt is comfortable. ROs unable to obtain.   Family and RN noted erythema and cystic swelling left inguinal area.  Stable on low flow oxygen.    ROS    Please see  Subjective.      Objective:  Vital signs    Temp:  [98.5  F (36.9  C)-98.8  F (37.1  C)] 98.8  F (37.1  C)  Heart Rate:  [81-89] 82  Resp:  [24-28] 26  BP: (125-135)/(94-95) 135/95  FiO2 (%):  [30 %-40 %] 30 %    Weight    Scheduled Meds:  ? famotidine  20 mg Enteral Tube BID   ? guar gum  1 packet Enteral Tube TID   ? heparin (PF) ANTICOAGULANT  5,000 Units Subcutaneous Q8H FIXED TIMES   ? ipratropium-albuteroL  3 mL Nebulization Q6H - RT   ? [Held by provider] lisinopriL  20 mg Enteral Tube Daily   ? menthol-zinc oxide   Topical TID   ? sodium chloride  3 mL Nebulization BID   ? tobramycin  80 mg Nebulization Q8H - RT     Continuous Infusions:  PRN Meds:.acetaminophen **OR** acetaminophen, alteplase, bisacodyL, dextrose 50 % (D50W), docusate sodium, glucagon (human recombinant), hydrALAZINE, magnesium hydroxide, naloxone **OR** naloxone **OR** naloxone **OR** naloxone, oxyCODONE, polyethylene glycol, sodium chloride 0.9%, sodium chloride  Intake/Output    I/O last 3 completed shifts:  In: 4281 [NG/GT:4281]  Out: 2100 [Urine:2100]     Physical Exam:       General Appearance: No acute distress, sitting up in bed, tired appearing  HEENT- Healed surgical scar on scalp  Neck- trach+to mask with increased secretions   Lungs: Clear to auscultation bilaterally  Cardiovascular: S1S2 present, no murmurs or rubs,  no lower extremity edema  Abdomen: Soft, non distended, bowel sounds present,    Extremities:  no edema, left inguinalhernia  Skin: Warm and dry  Neurologic: tired, interacting but soft and incomprehensible voice  Indurated cyst  left inguinal area 2X2 cm with erythema and warmth     LINES and TUBES: external urinary cathter,  trach, g tube+     Imaging: I have independently reviewed.     Lab Results:  have independently reviewed lab results.     All medication issues identified within the last 24 hours have been addressed and completed as appropriate.     Barriers to disposition:  vent and trach weaning   Expected  discharge- multiple days expected     Discussed with nursing, RT       Cristóbal Gann MD  [facility] Hospitalist  Pager [phone]

## 2021-07-05 NOTE — PROGRESS NOTES
Progress Notes by Esha Jarrett LRT at 7/5/2021  3:46 PM     Author: Esha Jarrett LRT Service: -- Author Type: Respiratory Therapist    Filed: 7/5/2021  3:46 PM Date of Service: 7/5/2021  3:46 PM Status: Signed    : Esha Jarrett LRT (Respiratory Therapist)       Trach changed to #7 bivona.  No adverse reactions noted.

## 2021-07-05 NOTE — PROGRESS NOTES
Progress Notes by Dayron Chen LICSW at 7/5/2021  7:19 AM     Author: Dayron Chen LICSW Service: -- Author Type: Licensed Social Worker    Filed: 7/5/2021 11:19 AM Date of Service: 7/5/2021  7:19 AM Status: Addendum    : Dayron Chen LICSW (Licensed Social Worker)    Related Notes: Original Note by Dayron Chen LICSW (Licensed Social Worker) filed at 7/5/2021  7:21 AM       Patient chart reviewed.  Patient discussed in morning rounds.  Barriers to discharge: hands mitss x2.  Trach.  Vent.  HFTM.  Vent @ [facility].      Patient is from home.  Patient is .  Patient's exact discharge date, time, disposition TBD.  If patient requires TCU, spouse prefers [facility].  Met with spouse and daughter today to offer listening and support.    SW will continue to follow for psychosocial and emotional support of patient and family. SW to facilitate discharge to TCU when pt no longer requires LTACH level of care.          FADIA Doyle  [facility] [place]  [phone]

## 2021-07-06 PROCEDURE — 94003 VENT MGMT INPAT SUBQ DAY: CPT

## 2021-07-06 PROCEDURE — 999N001212 HC REV CODE 9999 CHARGE CONVERSION OPNP: Mod: GP

## 2021-07-06 PROCEDURE — 97112 NEUROMUSCULAR REEDUCATION: CPT | Mod: GP

## 2021-07-06 PROCEDURE — 94640 AIRWAY INHALATION TREATMENT: CPT

## 2021-07-06 PROCEDURE — 97535 SELF CARE MNGMENT TRAINING: CPT | Mod: GO

## 2021-07-06 PROCEDURE — 97530 THERAPEUTIC ACTIVITIES: CPT | Mod: GO

## 2021-07-06 ASSESSMENT — MIFFLIN-ST. JEOR: SCORE: 1451.26

## 2021-07-06 NOTE — PLAN OF CARE
Plan of Care by Joe Arango RN at 7/6/2021  3:13 AM     Author: Joe Arango RN Service: -- Author Type: Registered Nurse    Filed: 7/6/2021  6:27 AM Date of Service: 7/6/2021  3:13 AM Status: Signed    : Joe Arango RN (Registered Nurse)         Problem: Pain  Goal: Patient's pain/discomfort is manageable  Outcome: Progressing   Pt appears comfortable and resting; some rigidity with limbs but no other nonverbal signs of pain noted.  Problem: Psychosocial Needs  Goal: Demonstrates ability to cope with hospitalization/illness  Outcome: Progressing   Pt is unable to cooperate with cares; does not resist turning and repositioning.  Problem: Potential for transmission of organism by Contact, Enteric, Droplet and/or Airborne routes  Goal: Prevent transmission of organisms  Outcome: Progressing   Enteric and contact precautions remain in place.   Problem: Risk of Injury Due to Unsafe Behavior  Goal: Patient will remain safe while in restraint; physical/psychological needs will be met  Outcome: Progressing   No line pulling behaviors noted overnight; mitt restraints remain in place at present.  Some red blanchable tissue noted on the palms but circulation remains intact.  Problem: Potential for Compromised Skin Integrity  Goal: Skin integrity is maintained or improved  Outcome: Progressing   No new skin issues noted. Pt has an external catheter which has been having some problems remaining patent and draining; brief was trialed and in the span of two hours, Pt saturated the brief beyond its capacity, thus a third attempt at placing an external catheter was made.  External catheter seems to be working as of 0600

## 2021-07-06 NOTE — PLAN OF CARE
Plan of Care by Jacqueline Vieyra RN at 2021  9:23 AM     Author: Jacqueline Vieyra RN Service: -- Author Type: RN, Care Manager    Filed: 2021  9:33 AM Date of Service: 2021  9:23 AM Status: Signed    : Jacqueline Vieyra RN (RN, Care Manager)       Care Management Progression of Care Update        DR GLOS - Target D/C Date 21        PLAN/GOALS  1. Pulmonary following. Phase 2 wean~Trach mask hi rosemarie 10L/30% Fi02.  Tolerated 13.8 hours w/PMV. Vent at night settings PRVC/AC  Frequent suctioning 8x/shift. Duo-neb six times a day, Barry q8hrs and Saline two times a day for pulmonary hygiene.    2.  Nutrition management.  Tube feeding via PEG placed 21.  Registered dietician to monitor tube feeding tolerance, weight and labs.    3.  Neurology following intermittently.    4. PT/OT 5x/week. Dependent with all transfers. Did accomplish standing 3x but max assist.    5.  Speech therapy 4x/week. Repeat bedside swallow this week.    6.  Palliative following.           BARRIERS  1.  Acute hypoxic respiratory failure due to subarachnoid hemorrhage.  Vent / trach / wean.    2.  Acute metabolic encephalopathy.    3.  Oropharyngeal dysphagia.    4.   Bilateral mitts for safety.        Disposition: TCU  Care Manager Name:  Jacqueline Vieyra RN,BSN, HNB-BC    Date/Time:  21 9:24 AM

## 2021-07-06 NOTE — PROGRESS NOTES
Progress Notes by Kim Knox MD at 7/6/2021  7:15 AM     Author: Kim Knox MD Service: Hospitalist Author Type: Physician    Filed: 7/6/2021 11:27 AM Date of Service: 7/6/2021  7:15 AM Status: Addendum    : Kim Knox MD (Physician)    Related Notes: Original Note by Kim Knox MD (Physician) filed at 7/6/2021 11:27 AM          [facility] Hospitalist Daily Progress Note   Brief summary:  70 y.o. old male with past medical history of hypertension who presented to ED on 5/15/2021 for altered mental status. He had a fall and was found unresponsive by his wife. He was found to have acute subarachnoid hemorrhage.  On 5/15, he underwent left external ventricular drain placement.  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 One the same day, he also underwent craniotomy with clipping of PCOM aneurysm.  On 5/28/2021, patient had persistently elevated TCD, CT head showed bilateral BAYLEE infarct.  Next day, for persistent TCD and CTA showing bilateral BAYLEE vasospasm, was treated with milrinone and verapamil.  On 6/1/2021, angiogram showed no evidence of vasospasm.  He was extubated on 6/3/2021.On 6/6/2021, patient was reintubated due to drop in his saturation.  On 6/7/2021, patient underwent tracheostomy and PEG tube placement.  On 6/9/2021, EVD was removed.  Patient was initially started on Unasyn for possible pneumonia on 5/20/2021 which was switched to ceftriaxone the next day for sputum culture growing Klebsiella.  On 5/23, sputum culture also grew Pseudomonas for which ceftriaxone was switched to Zosyn.  Due to increased white blood cell counts, possibility of ventriculitis was raised however CSF was negative but antibiotic was switched to cefepime and vancomycin on 5/28/2021 with plan to continue for 2 weeks.  Vanco has been discontinued on 6/9/2021.  On 6/5/2021, sputum grew ESBL so cefepime was switched to meropenem.  He was transferred to [facility] on  6/11/21. Repeat sputum culture 6/19 and he was started on tobramycin nebulization. He has persistent tracheal secretions but improving.     6/14/21 CT head done for fatigue- read as slight increase in caliber of lateral and third ventricle  With possibility of developing communicating hydrocephalus. Discussed with Dr. Casillas (neurosurgeon at [facility]), who did not think there was much change, however suggested repeating CT head on 6/16/21 which did not show any change.     RT and pulmonary is following and pt is on phase 2 weaning with TM/PMV during day and full vent at night. Vent weaning slowed due to poor cough and secretions.Mental status is slowly improving. He is tolerating tube feeding.     He has severe debility and malnourishment and is progressing slowly and appears malnourishment, weakness is major factor in slow progression and at risk for set backs.    Assessment/Plan    Principal Problem:    SAH (subarachnoid hemorrhage) (H)  Active Problems:    Acute respiratory failure with hypoxia (H)    ESBL (extended spectrum beta-lactamase) producing bacteria infection    Attention to tracheostomy (H)    Acute and chronic respiratory failure with hypoxia (H)    On mechanically assisted ventilation (H)    Encounter for palliative care    Encephalopathy    Metabolic encephalopathy    Idiopathic hypertension    Oropharyngeal dysphagia    Moderate protein-calorie malnutrition (H)    Pseudomonas aeruginosa infection    Acute tracheobronchitis    Cerebrovascular accident (CVA) due to occlusion of cerebral artery (H)    Acute hypoxemic respiratory failure  S/p tracheostomy on 6/7/21, on MV  On shiley 6, next due for change likely tomorrow  On phase 2 weaning and full vent at night, slow weaning due to poor cough and secretions  Continue PT/OT, bronchial hygiene  Continue vent weaning per pulmonary     Pseudomonas tracheobronchitis/colonization, Has persistent tracheal secretions but improving,   Continues on tobramycin  nebulization for 2 weeks  Continue pulmonary toilet, scheduled justine     Recent subarachnoid hemorrhage  Presented with fall and unresponsiveness on 5/15/2021.  S/p EVD, angiogram showing ruptured posterior communicating aneurysm followed by craniotomy with clipping of PCOM aneurysm on the same day.  EVD removed on 6/9/2021.    Postoperative period was complicated by vasospasm, bilateral BAYLEE infarct. MRI done on 5/28 showed right frontal parietal, temopral and left parasagittal parietal lobes infarcts.   On 6/1/2021, angiogram showed no vasospasm.  Will need to follow up with Dr. Martinez in Neurosurgery clinic in 3 months with repeat CTA of head and neck for post-operative follow up     Metabolic encephalopathy: multifactorial  On restraints  CT head 7/1 no new changes  Previous hospitalist discussed with neurology on 7/1/2021   Mental status appears to be flucutating  Family considering ritalin, neurology was ok with ritalin, neurosurgery was contacted and family is waiting to hear from them    Hypertension-stable  On lisinopril     Oropharyngeal dysphagia  On tube feeding via PEG tube, dietitian, speech following.      Inflamed possibly sebaceous cyst, left inguinal area,   Keflex 500 mg three times a day for 5 days, started on 7/5.  Needs outpatient Surgery evaluation for removal - can follow up for both inguinal hernia and cyst as outpatient    Moderate protein-calorie malnutrition  On tube feeding  RD following     Left inguinal hernia- Monitoring  Needs outpatient evaluation     Severe debility, weakness, critical illness, deconditioning, Continue PT/OT     DVT prophylaxis: Heparin sq  GI prophylaxis: Pepcid     Subjective:   Patient seen, examined.   Chart reviewed and events noted.  Patient appears sleepy, opens eyes but falls asleep again. Not interacting. Appears calm and comfortable.   D/w RN    ROS  Unable to obtain.Please see subjective.     Objective:  Vital signs    Temp:  [97.6  F (36.4  C)-98.9  F  (37.2  C)] 98.9  F (37.2  C)  Heart Rate:  [73-87] 77  Resp:  [14-26] 26  BP: (117-136)/(73-80) 124/80  FiO2 (%):  [26 %-35 %] 28 %    Weight    Scheduled Meds:  ? cephalexin  500 mg Enteral Tube TID   ? famotidine  20 mg Enteral Tube BID   ? guar gum  1 packet Enteral Tube TID   ? heparin (PF) ANTICOAGULANT  5,000 Units Subcutaneous Q8H FIXED TIMES   ? ipratropium-albuteroL  3 mL Nebulization Q6H - RT   ? lisinopriL  20 mg Enteral Tube Daily   ? menthol-zinc oxide   Topical TID   ? sodium chloride  3 mL Nebulization BID   ? tobramycin  80 mg Nebulization Q8H - RT     Continuous Infusions:  PRN Meds:.acetaminophen **OR** acetaminophen, alteplase, bisacodyL, dextrose 50 % (D50W), docusate sodium, glucagon (human recombinant), hydrALAZINE, magnesium hydroxide, naloxone **OR** naloxone **OR** naloxone **OR** naloxone, oxyCODONE, polyethylene glycol, sodium chloride 0.9%, sodium chloride  Intake/Output    I/O last 3 completed shifts:  In: 3543 [NG/GT:3543]  Out: 1525 [Urine:1525]     Physical Exam:       General Appearance:Chronically ill appearing , NAD  HEENT- Healed surgical scar on scalp  Neck- trach+to mask with increased secretions  Lungs: Clear to auscultation bilaterally, diminished  Cardiovascular: RRR  Abdomen: Soft, non distended  Extremities:  no edema  Neurologic: Somnolent         Imaging: I have independently reviewed.     Lab Results:  have independently reviewed lab results.     All medication issues identified within the last 24 hours have been addressed and completed as appropriate.     Barriers to disposition:  Vent and trach weaning   Expected discharge- multiple days expected     Discussed with RN, MARY KATE and his wife Susy over the ipad in patients room.      Kim Knox MD  Hospitalist

## 2021-07-06 NOTE — PROGRESS NOTES
Progress Notes by Odette Darby RD at 7/6/2021 12:53 PM     Author: Odette Darby RD Service: -- Author Type: Registered Dietitian    Filed: 7/6/2021  1:13 PM Date of Service: 7/6/2021 12:53 PM Status: Signed    : Odette Darby RD (Registered Dietitian)       Clinical Nutrition Therapy Follow Up Note    RD talked to pt's wife and daughter today who asked about his weight trends and accuracy. They are also concerned that he is not getting enough speech therapy and physical therapy.    Pt is on phase 2 weaning; his mentation is improving.     Current Nutrition Prescription:   Diet: TF, no tray with Osmolite 1.5 at 90 mL/hour and 360 mL FWF q 4 hours, 1 packet Benefiber three times a day     Current Nutrition Intake:  Pt has a PEG tube which was placed 6/7/21.    Current TF regimen with modular provides 2160 mL formula, 3288 kcals, 136 g protein, 453 g CHO, 106 g fat, 9 g fiber, 1645 mL fluid (total 3806 mL between formula and flushes).    His wife asked if TF rate will be increased, but RD is going to keep regimen the same to avoid overfeeding the pt. He is currently getting 50 kcal per kg and 2.1 g protein/kg.    SLP saw pt yesterday and recommends considering a repeat BDT this week.    Anthropometrics:  Admission weight: 145 lb per RD note on 6/12  Weight: 145 lb 5 oz (65.9 kg)-bed  His weight has been 137-145 lb in the last week, with two weights at 142 lb. RD will make sure staff is still zeroing the bed and removing extra objects before taking a weight.    The pt's wife reports that he was underweight even before being admitted to [facility]. He walked 2-3 miles a day and was not a big eater; it was a struggle for him to gain weight.    Per chart he weighed 156 lb 6/7/21 and 151 lb 6/11/21.     GI Status/Output:   BM 7/4-formed  He is +13.3 L since 6/22/21.    Skin/Wound:  Red, blanchable sacrum/coccyx noted.    Medications:  Keflix, pepcid    Labs:  BUN 28-worse  Cr 0.67-improving    Estimated  Nutrition Needs:  Assessment weight is 63 kg, most accurate weight (as of 6/23) (81% IBW of 172 lb)     Energy Needs: 2784-6002 kcals daily, 45-50 kcal/kg (updated 6/28 since pt only maintaining wt at 40-45 kcal/kg)  Protein Needs: + mg daily, 1.5-2+ g/kg   Fluid Needs: 6733-8843 mls daily, 25-30 mls/kg     Malnutrition: Noted previously-moderate    Nutrition dx:   Swallowing difficulty r/t respiratory failure as evidenced by requiring enteral nutrition-not progressing    Goal Status:  Meet estimated nutrition needs via TF-progressing  Gain weight (changed 6/28)-not progressing-wt is being maintained  Advance diet-not progressing    Intervention:  Increase FWF to 400 mL q 4 hours to help correct BUN. He will receive a total of 4045 mL fluid between FWF and formula, which is 239 mL more per day than he is currently getting.    Will write sticky note to nursing to zero bed before taking wt and remove extra objects such as gel pillows.    Will write sticky note to MD re: family's concerns that pt is not getting enough therapy.    Monitoring/Evaluation:  TF tolerance, weight, labs, stooling, ability to advance diet    Odette Darby RD, LD

## 2021-07-06 NOTE — PLAN OF CARE
Plan of Care by Kayla Small LRT at 7/6/2021  5:31 AM     Author: Kayla Small LRT Service: -- Author Type: Respiratory Therapist    Filed: 7/6/2021  5:46 AM Date of Service: 7/6/2021  5:31 AM Status: Signed    : Kayla Small LRT (Respiratory Therapist)       RT PROGRESS NOTE    DATA  CURRENT SETTINGS:   Vent Mode: PRVC A/C  FiO2 (%):  [26 %-35 %] 30 %  S RR:  [14] 14  S VT:  [450 mL] 450 mL  PEEP/CPAP (cm H2O):  [5 cm H2O] 5 cm H2O  Minute Ventilation (L/min):  [9.8 L/min-14.2 L/min] 9.8 L/min  PIP:  [9 cm H2O-20 cm H2O] 12 cm H2O  MAP (cm H2O):  [6-8] 6   AIRWAY: 7.0 Bivona Cuffed    ACTION  THERAPIES: Q6 Duoneb; two times a day NaCl 3%; Q8 Barry  SUCTION:   Frequency - twice   Amount - large   Consistency - thick    Color - white/yellow    Spontaneous Cough/Effort - strong/productive     WEANING:    Wean Phase #2   Wean Mode - HFTD During Day W/PMV   Wean Time - 13.8 hours   End Wean Reason - Vent Support @ Noc    RESPONSE  BS: Clear/Diminished  VITAL SIGNS:   Temp:  [97.6  F (36.4  C)-98.9  F (37.2  C)] 97.6  F (36.4  C)  Heart Rate:  [73-93] 73  Resp:  [14-28] 14  BP: (117-136)/(73-79) 136/76  SpO2:  [89 %-99 %] 94 %  FiO2 (%):  [26 %-35 %] 30 %   EMOTIONAL CONCERNS: None   RISK FOR SELF DECANNULATION: Yes; Mitt Restraints in Place     NOTE -- Patient is resting on full ventilatory support overnight per written order. Will return to high flow to trach in the morning at 20 LPM 25%-30%. RT will continue to monitor.     Kayla mSall      Problem: Mechanical Ventilation  Goal: Patient will maintain patent airway  Outcome: Progressing  Goal: Tracheostomy will be managed safely  Outcome: Progressing  Goal: Respiratory status - ventilation  Description: Movement of air in and out of the lungs.    Liberate from ventilator  Outcome: Progressing     Problem: Chronic Tracheostomy  Goal: Patient is able to maintain stable respiratory status with long term  tracheostomy  Outcome: Progressing

## 2021-07-06 NOTE — PLAN OF CARE
Plan of Care by Valeria Pereira RN at 7/6/2021 11:21 AM     Author: Valeria Pereira RN Service: -- Author Type: Registered Nurse    Filed: 7/6/2021 11:21 AM Date of Service: 7/6/2021 11:21 AM Status: Signed    : Valeria Pereira RN (Registered Nurse)       No sign or symptom of pain noted. Will continue to monitor.

## 2021-07-06 NOTE — PROGRESS NOTES
Progress Notes by Kuldeep Steinberg MD at 7/6/2021 11:26 AM     Author: Kuldeep Steinberg MD Service: Critical Care Medicine Author Type: Physician    Filed: 7/6/2021 11:29 AM Date of Service: 7/6/2021 11:26 AM Status: Signed    : Kuldeep Steinberg MD (Physician)       Pulmonary Medicine Follow up Note - Ventilator Rounds:    Clinical status discussed today with respiratory therapist.    Chief complaint: Ongoing respiratory failure  Significant change in clinical status during past 24 hours? no     CURRENT SETTINGS:             TRACH TYPE / SIZE:  7 Bivona placed yesterday             MODE: PRVC 14/450/+5/24%    Tracheal secretions: Multiple episodes of suctioning overnight.  From large to scant, thick to normal.    Current phase of ventilator weaning pathway:  Phase 2, HFTM/PMV day, full vent at night    Vent weaning results from yesterday: HFTM/PMV x 13.8 hours.    Physical exam   Vitals:    07/06/21 0653 07/06/21 0730 07/06/21 0855 07/06/21 0900   BP:  124/80     Patient Position:  Lying     Pulse:  81 77    Resp:  26 26    Temp:  98.9  F (37.2  C)     TempSrc:  Axillary     SpO2:  100% 95% 94%   Weight: 145 lb 5 oz (65.9 kg)      Height:         Gen: Cachectic, no distress, generalized weakness  HEENT: pale conjunctiva, moist mucosa  Neck: trach midline, dressing c/d/i  Lungs: diminished ant no wheezing or rhonchi  CV: regular, no murmurs or gallops appreciated  Abdomen: soft, NT, BS wnl  Ext: no edema  Neuro: Sleeping.    Results from last 7 days   Lab Units 07/05/21  0636   LN-WHITE BLOOD CELL COUNT thou/uL 10.5   LN-HEMOGLOBIN g/dL 10.5*   LN-HEMATOCRIT % 32.7*   LN-PLATELET COUNT thou/uL 325     Results from last 7 days   Lab Units 07/05/21  1518   LN-SODIUM mmol/L 139   LN-POTASSIUM mmol/L 4.5   LN-CHLORIDE mmol/L 99   LN-CO2 mmol/L 28   LN-BLOOD UREA NITROGEN mg/dL 28*   LN-CREATININE mg/dL 0.67*   LN-CALCIUM mg/dL 10.6*     Culture 4+ Pseudomonas aeruginosaAbnormal     Reported and  sent to [facility] as part of the  Emerging Infections Surveillance Program.          Gram Stain Result  4+ Polymorphonuclear leukocytes      2+ Gram negative bacilli            Resulting Agency: [facility]   Susceptibility     Pseudomonas aeruginosa     SUSAN     Aztreonam >16  Resistant     Cefepime >16  Resistant     Ceftazidime >16  Resistant     Ciprofloxacin 2  Resistant     Gentamicin <=2  Sensitive     Levofloxacin 4  Resistant     Meropenem >8  Resistant     Piperacillin + Tazobactam >64/4  Resistant     Tobramycin <=2  Sensitive         XR CHEST 1 VIEW PORTABLE  LOCATION: [facility]  DATE/TIME: 6/18/2021 4:37 PM     INDICATION: increased secretions  COMPARISON: 06/14/2021, 06/09/2021     IMPRESSION:   Tracheostomy tube in place. Left basilar opacities partially silhouetting the left hemidiaphragm are unchanged and again presumed to reflect some atelectasis. Right lung is clear. Heart and pulmonary vascularity are normal. Gastrostomy tube.    Diagnosis:     Patient is a 71 yo man prolonged hypoxic respiratory failure in the setting of recent subarachnoid hemorrhage.    1. Acute respiratory failure s/p trach 6/7/2021  2. Pseudomonas acute tracheobronchitis started doris nebs  3. Encephalopathy   4. Subarachnoid bleed s/p craniotomy and clipping P-comm rupture aneurysm.   5. HTN  6. Malnourishment   7. Dysphagia s/p G tube    Recommendations/Plans (MDM):  1. Weaning orders: Continue phase 2, TM/PMV during the day full vent at night, restarted PMV 6/30 for first time in a while, tolerating well but has critical illness neuromyopathy, needs aggressive nutrition, PT/OT, bronchial hygiene. If he looks considerably stronger with better secretion management next week, could consider trials off the vent at night; would check morning ABG if this is attempted.  2. Trach change?  7 Bivona placed yesterday.  3. Diagnostic testing? no  4. Pseudomonas pneumonia/airway colonization: Monitor clinically.  5.  Continue pulmonary toiletting scheduled duonebs q6h, HS nebs two times a day    Kuldeep Steinberg MD

## 2021-07-06 NOTE — PLAN OF CARE
"Plan of Care by Esha Jarrett LRT at 7/6/2021  4:46 PM     Author: Esha Jarrett LRT Service: -- Author Type: Respiratory Therapist    Filed: 7/6/2021  5:10 PM Date of Service: 7/6/2021  4:46 PM Status: Signed    : Esha Jarrett LRT (Respiratory Therapist)         Problem: Mechanical Ventilation  Goal: Patient will maintain patent airway  Outcome: Progressing  Goal: Tracheostomy will be managed safely  Outcome: Progressing  Goal: Respiratory status - ventilation  Description: Movement of air in and out of the lungs.    Liberate from ventilator  Outcome: Progressing   RT PROGRESS NOTE     DATA:     CURRENT SETTINGS:             TRACH TYPE / SIZE:  #7 bivona placed 7/5             MODE:   prvc/ac, 14, 450, peep 5, 30% at night     ACTION:             THERAPIES:   duo neb q6h, saline two times a day, tobramycin q8h             SUCTION:                           FREQUENCY:   x5                        AMOUNT:   copious x2; one of these suctions was after cuff deflated.  Otherwise pt has had small to large                        CONSISTENCY:   normal to thin                        COLOR:   white to pale yellow             SPONTANEOUS COUGH EFFORT/STRENGTH OF EFFORT (not elicited by suctioning): moderate, unable to clear mucous on his own                              WEANING PHASE:   2                        WEAN MODE:   10L and 28% tm and pmv                         WEAN TIME:   started at 08:50                           RESPONSE:             BS:   clear and diminished             VITAL SIGNS:   Blood pressure (!) 132/93, pulse 79, temperature 97.9  F (36.6  C), temperature source Axillary, resp. rate 18, height 5' 11\" (1.803 m), weight 145 lb 5 oz (65.9 kg), SpO2 94 %.                 EMOTIONAL NEEDS / CONCERNS:  no                RISK FOR SELF DECANNULATION:  yes                        RISK DUE TO:  impulsive                         INTERVENTION/S IN PLACE IS/ARE:  bilateral mittens       NOTE / " PLAN:   pt continuing to get stronger.  Cough becoming stronger over time, but pt still unable to clear on his own.  Pt did have a very strong cough this evening when going back to bed and with assistance was able to use the yankauer to clear mucous.  Pt appears to have less suction requirements so far today.  Will continue to monitor and support.

## 2021-07-06 NOTE — PROGRESS NOTES
Progress Notes by Maximus Mathis CNP at 7/6/2021  2:49 PM     Author: Maximus Mathis CNP Service: Palliative Care Author Type: Nurse Practitioner    Filed: 7/6/2021  3:11 PM Date of Service: 7/6/2021  2:49 PM Status: Addendum    : Maximus Mathis CNP (Nurse Practitioner)    Related Notes: Original Note by Maximus Mathis CNP (Nurse Practitioner) filed at 7/6/2021  3:10 PM       [facility] - Palliative Care Progress Note    PATIENT NAME: Dayron Neri  DATE OF ADMISSION: 6/11/2021   Today the patient was seen for: Symptom management  Goals of care  Patient/family support       Impressions & Recommendations     Symptom management  # Pain: Denies.    - Tylenol PRN  - Discontinue opioid as he's not required     # Dyspnea: Denies.  Remains on nocturnal vent with TM weans during the day.   - bronchial hygiene & abx per pulm team  - consider switching nebs to albuterol only to avoid anticholinergics in setting of encephalopathy      # Constipation: last BM 7/4  - Bowel regimen    # Fatigue in setting of acute illness - slowly improving  - PT/OT, OOB    # Encephalopathy: in setting of SAH s/p craniotomy and clipping P-comm ruptured aneurysm, and b/l infarct.  EEG neg for seizures.  Repeat CT 7/1 with no acute changes.  Slow progression   - Avoid benzodiazepines, antihistamines, anticholinergics if able  - Lights on and blinds open during the day.  Reorient frequently  - Lights & TV off during the night.  Promote normal circadian rhythm  - Limit sensory deprivation - utilize hearing aids, glasses, etc  - Frequently assess unmet bathroom needs if applicable.    - [facility] [place] adding methylphenidate. Waiting on input from NSG.  If consider starting, recommend starting 5mg at 0800 and noon.  If no effect in 2 days, may double dose; if no improvement after 7 days, discontinue    # Anxiety: denies  # Nausea: denies    Psychosocial/spiritual support: lives with wife.  Has a daughter.  Patient is  Jorge.      Palliative SW following    Goals of care    Restorative with hopes to eventually get back home    Code status: Full Code    Advanced Care Planning    Patient has a completed Health Care Directive: No    Surrogate decision maker if no appointed agent: wife Susy         Summary          70 yoM with PMH HTN admitted 5/15/21  for AMS and a fall. Found to have acute subarachnoid hemorrhage and underwent left external ventricular drain placement.  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  Course complicated by CT head showing bilateral BAYLEE infarct.  Extubated 5/16, re-intubated 5/19, extubated again 6/3/2021.  Re-intubated again 6/6/2021 d/t hypoxia.  S/p trach/PEG on 6/7/2021, EVD removed 6/9/2021.  Admitted to LTACH 6/11.              Palliative overview:     6/29: LTAC consult.  Goals are restorative.  Full code  7/1:  No significant changes.  Slow overall progress.    7/6: Patient quite tired after PT session today.  Per PT, he did very well and was quite engaged.  He was minimally responsive during my visit.  Difficult to obtain any meaningful ROS.  Appeared to be in no distress with no unmet symptom burden at this time.  Wife and daughter were present on iPAD.  We talked about his overall progression and they are more happy and optimistic compared to my previous visits.  They noted he has acknowledged there names and was cracking jokes the other day which was new and encouraging.  They were hoping speech therapy can come in more often and maybe come later in the day as he seems to generally be more awake in the afternoon compared to mornings(I discussed this with speech therapy after my visit ended).  We also talked about considering Ritalin and they still haven't heard back from [facility] yet.  I noted that initiating any new med can have potential adverse effects and will need to weight the risk vs benefit.  They welcome further visits from our  service.      Does the patient have decision making capacity? No    Prognosis, Goals, or Advance Care Planning was addressed today with: Yes.  Mood, coping, and/or meaning in the context of serious illness were addressed today: Yes.           Review of Systems:   See above          Medications:     I have reviewed this patient's medication profile and medications during this hospitalization.    ? cephalexin  500 mg Enteral Tube TID   ? famotidine  20 mg Enteral Tube BID   ? guar gum  1 packet Enteral Tube TID   ? heparin (PF) ANTICOAGULANT  5,000 Units Subcutaneous Q8H FIXED TIMES   ? ipratropium-albuteroL  3 mL Nebulization Q6H - RT   ? lisinopriL  20 mg Enteral Tube Daily   ? menthol-zinc oxide   Topical TID   ? sodium chloride  3 mL Nebulization BID   ? tobramycin  80 mg Nebulization Q8H - RT        PRN MEDS:   acetaminophen **OR** acetaminophen, alteplase, bisacodyL, dextrose 50 % (D50W), docusate sodium, glucagon (human recombinant), hydrALAZINE, loperamide, magnesium hydroxide, naloxone **OR** naloxone **OR** naloxone **OR** naloxone, oxyCODONE, polyethylene glycol, sodium chloride 0.9%, sodium chloride          Objective:   Vital Last Value 24 Hour Range (min/max)    Temp  Min: 97.6  F (36.4  C)  Max: 98.9  F (37.2  C)    Pulse  Min: 73  Max: 87    Resp  Min: 14  Max: 26    BP  Min: 117/73  Max: 136/76    SpO2  Min: 89 %  Max: 100 %      PHYSICAL EXAMINATION:   General Appearance: sitting in chair in NAD  HEENT: Normocephalic, atraumatic.  Lips, mucosa, and tongue moist  Resp: CTA anterolaterally; non-labored, on trach dome/PMV   CV:  RRR; mild ROSE  Abdomen: Soft, nontender, bowel sounds active  Extremities: Normal, atraumatic, able to move all   Skin: Warm and dry, no rashes.   Neurologic: alert to voice, tracks, not following commands, lethargic   Psych:  Calm            Data Reviewed:     Reviewed recent pertinent imaging, comments:   No results found.  For a range for imaging in the last 24  hours    Reviewed recent labs, comments:   Lab Results:  personally reviewed  Lab Results   Component Value Date     07/05/2021    K 4.5 07/05/2021    CL 99 07/05/2021    CO2 28 07/05/2021    BUN 28 (H) 07/05/2021    CREATININE 0.67 (L) 07/05/2021    CALCIUM 10.6 (H) 07/05/2021     Lab Results   Component Value Date    WBC 10.5 07/05/2021    HGB 10.5 (L) 07/05/2021    HCT 32.7 (L) 07/05/2021    MCV 96 07/05/2021     07/05/2021     Lab Results   Component Value Date/Time    ALBUMIN 2.1 (L) 06/14/2021 02:50 PM     Wt Readings from Last 3 Encounters:   07/06/21 145 lb 5 oz (65.9 kg)         Information gathered today from: patient, family/loved ones, medical team members, unit team members and chart review in Epic     TTS: I have personally spent a total of 35 minutes  today on the unit in review of medical record, consultation with the medical providers and assessment of patient today, with more than 50% of this time spent in counseling, coordination of care, and conversation patient, wife, and daughter re: prognosis, symptom management, emotional support and development of plan of care     Maximus Mathis, [facility]  Palliative Medicine  [facility]  Pager [phone]      During regular M-F work hours -- if you are not sure who specifically to contact -- please contact us by calling [phone].

## 2021-07-06 NOTE — PLAN OF CARE
Plan of Care by Amanda Jean RN at 7/5/2021 10:51 PM     Author: Amanda Jean RN Service: -- Author Type: Registered Nurse    Filed: 7/5/2021 10:59 PM Date of Service: 7/5/2021 10:51 PM Status: Signed    : Amanda Jean RN (Registered Nurse)         Problem: Daily Care  Goal: Daily care needs are met  Outcome: Progressing   Patient was calm , appeared  very tired but was up on chair this evening and patient tolerated it well. Patient denied pain.

## 2021-07-07 LAB
ANION GAP SERPL CALCULATED.3IONS-SCNC: 7 MMOL/L (ref 5–18)
BASOPHILS # BLD AUTO: 0 THOU/UL (ref 0–0.2)
BASOPHILS NFR BLD AUTO: 0 % (ref 0–2)
BUN SERPL-MCNC: 27 MG/DL (ref 8–22)
CALCIUM SERPL-MCNC: 10.7 MG/DL (ref 8.5–10.5)
CHLORIDE BLD-SCNC: 98 MMOL/L (ref 98–107)
CO2 SERPL-SCNC: 33 MMOL/L (ref 22–31)
CREAT SERPL-MCNC: 0.59 MG/DL (ref 0.7–1.3)
EOSINOPHIL # BLD AUTO: 0.2 THOU/UL (ref 0–0.4)
EOSINOPHIL NFR BLD AUTO: 2 % (ref 0–6)
ERYTHROCYTE [DISTWIDTH] IN BLOOD BY AUTOMATED COUNT: 14.5 % (ref 11–14.5)
GFR SERPL CREATININE-BSD FRML MDRD: >60 ML/MIN/1.73M2
GLUCOSE BLD-MCNC: 106 MG/DL (ref 70–125)
HCT VFR BLD AUTO: 30.8 % (ref 40–54)
HGB BLD-MCNC: 10 G/DL (ref 14–18)
IMM GRANULOCYTES # BLD: 0.1 THOU/UL
IMM GRANULOCYTES NFR BLD: 1 %
LYMPHOCYTES # BLD AUTO: 1.3 THOU/UL (ref 0.8–4.4)
LYMPHOCYTES NFR BLD AUTO: 12 % (ref 20–40)
MCH RBC QN AUTO: 31.3 PG (ref 27–34)
MCHC RBC AUTO-ENTMCNC: 32.5 G/DL (ref 32–36)
MCV RBC AUTO: 97 FL (ref 80–100)
MONOCYTES # BLD AUTO: 0.8 THOU/UL (ref 0–0.9)
MONOCYTES NFR BLD AUTO: 7 % (ref 2–10)
NEUTROPHILS # BLD AUTO: 8.9 THOU/UL (ref 2–7.7)
NEUTROPHILS NFR BLD AUTO: 79 % (ref 50–70)
PLATELET # BLD AUTO: 258 THOU/UL (ref 140–440)
PMV BLD AUTO: 9.7 FL (ref 8.5–12.5)
POTASSIUM BLD-SCNC: 4.3 MMOL/L (ref 3.5–5)
RBC # BLD AUTO: 3.19 MILL/UL (ref 4.4–6.2)
SODIUM SERPL-SCNC: 138 MMOL/L (ref 136–145)
WBC: 11.3 THOU/UL (ref 4–11)

## 2021-07-07 PROCEDURE — 999N001212 HC REV CODE 9999 CHARGE CONVERSION OPNP: Mod: GP

## 2021-07-07 PROCEDURE — 94003 VENT MGMT INPAT SUBQ DAY: CPT

## 2021-07-07 PROCEDURE — 97110 THERAPEUTIC EXERCISES: CPT | Mod: GP

## 2021-07-07 PROCEDURE — 97530 THERAPEUTIC ACTIVITIES: CPT | Mod: GO

## 2021-07-07 PROCEDURE — 85025 COMPLETE CBC W/AUTO DIFF WBC: CPT

## 2021-07-07 PROCEDURE — 92507 TX SP LANG VOICE COMM INDIV: CPT | Mod: GN

## 2021-07-07 PROCEDURE — 94640 AIRWAY INHALATION TREATMENT: CPT

## 2021-07-07 PROCEDURE — 36415 COLL VENOUS BLD VENIPUNCTURE: CPT

## 2021-07-07 PROCEDURE — 97129 THER IVNTJ 1ST 15 MIN: CPT | Mod: GO

## 2021-07-07 PROCEDURE — 80048 BASIC METABOLIC PNL TOTAL CA: CPT

## 2021-07-07 ASSESSMENT — MIFFLIN-ST. JEOR: SCORE: 1447.58

## 2021-07-07 NOTE — PLAN OF CARE
Problem: Mechanical Ventilation  Goal: Patient will maintain patent airway  Outcome: Progressing  Goal: Tracheostomy will be managed safely  Outcome: Progressing  Goal: Respiratory status - ventilation  Description: Movement of air in and out of the lungs.    Liberate from ventilator  Outcome: Progressing  RT PROGRESS NOTE     DATA:     CURRENT SETTINGS:               TRACH TYPE / SIZE: #6 Malcolm, placed 6/7/21             MODE:  HF 20L TM   FIO2:  30%     ACTION:             THERAPIES: Duo neb x 2 Q6, Saline neb BID and Tobramycin neb x 2 Q8 given on scheduled time             SUCTION:                           FREQUENCY: 8                        AMOUNT: mod x 2/ large                        CONSISTENCY: normal to thick                        COLOR:   white             SPONTANEOUS COUGH EFFORT/STRENGTH OF EFFORT (not elicited by suctioning): strong                               WEANING PHASE:   2                        WEAN MODE: 30% HF 20L TM/ PMV                        WEAN TIME: TM cont since 0836, PMV on for 3 hrs 29 min                        END WEAN REASON:      RESPONSE:             BS:   coarse to slightly coarse - clear decreased after Sx              VITAL SIGNS: O2 sat 92-96%, HR 79-86, RR 24-26             EMOTIONAL NEEDS / CONCERNS:  None               RISK FOR SELF DECANNULATION:  Yes                        RISK DUE TO: Confusion                        INTERVENTION/S IN PLACE IS/ARE: Restraints x2       NOTE / PLAN:  PMV off after speech therapy, lot of secretions. Continue current plan of care. TM with PMV during the day, vent at night with settings: PRVC/ AC 14, 450, +5, 24%

## 2021-07-07 NOTE — PROGRESS NOTES
Progress Notes by Kuldeep Steinberg MD at 7/7/2021 11:47 AM     Author: Kuldeep Steinberg MD Service: Critical Care Medicine Author Type: Physician    Filed: 7/7/2021 11:49 AM Date of Service: 7/7/2021 11:47 AM Status: Signed    : Kuldeep Steinberg MD (Physician)       Pulmonary Medicine Follow up Note - Ventilator Rounds:    Clinical status discussed today with respiratory therapist.    Chief complaint: Ongoing respiratory failure  Significant change in clinical status during past 24 hours? no     CURRENT SETTINGS:             TRACH TYPE / SIZE:  7 Bivona placed yesterday             MODE: PRVC 14/450/+5/24%    Tracheal secretions: Moderate to large thick secretions.    Current phase of ventilator weaning pathway:  Phase 2, HFTM/PMV more than 14 hours.  Vent at night.  A little bit tired at the end of it.      Physical exam   Vitals:    07/07/21 0500 07/07/21 0729 07/07/21 0805 07/07/21 0835   BP:  120/76     Patient Position:  Lying     Pulse:  85 84 87   Resp:  27 25 28   Temp:  96.8  F (36  C)     TempSrc:  Axillary     SpO2:  99% 99% 93%   Weight: 144 lb 8 oz (65.5 kg)      Height:         Gen: Cachectic, no distress, generalized weakness  HEENT: pale conjunctiva, moist mucosa  Neck: trach midline, dressing c/d/i  Lungs: diminished ant no wheezing or rhonchi  CV: regular, no murmurs or gallops appreciated  Abdomen: soft, NT, BS wnl  Ext: no edema  Neuro: Sleeping.    Results from last 7 days   Lab Units 07/07/21  0626   LN-WHITE BLOOD CELL COUNT thou/uL 11.3*   LN-HEMOGLOBIN g/dL 10.0*   LN-HEMATOCRIT % 30.8*   LN-PLATELET COUNT thou/uL 258     Results from last 7 days   Lab Units 07/05/21  1518   LN-SODIUM mmol/L 139   LN-POTASSIUM mmol/L 4.5   LN-CHLORIDE mmol/L 99   LN-CO2 mmol/L 28   LN-BLOOD UREA NITROGEN mg/dL 28*   LN-CREATININE mg/dL 0.67*   LN-CALCIUM mg/dL 10.6*     Culture 4+ Pseudomonas aeruginosaAbnormal     Reported and sent to [facility] as part of the  Emerging Infections  Surveillance Program.          Gram Stain Result  4+ Polymorphonuclear leukocytes      2+ Gram negative bacilli            Resulting Agency: [facility]   Susceptibility     Pseudomonas aeruginosa     SUSAN     Aztreonam >16  Resistant     Cefepime >16  Resistant     Ceftazidime >16  Resistant     Ciprofloxacin 2  Resistant     Gentamicin <=2  Sensitive     Levofloxacin 4  Resistant     Meropenem >8  Resistant     Piperacillin + Tazobactam >64/4  Resistant     Tobramycin <=2  Sensitive         XR CHEST 1 VIEW PORTABLE  LOCATION: [facility]  DATE/TIME: 6/18/2021 4:37 PM     INDICATION: increased secretions  COMPARISON: 06/14/2021, 06/09/2021     IMPRESSION:   Tracheostomy tube in place. Left basilar opacities partially silhouetting the left hemidiaphragm are unchanged and again presumed to reflect some atelectasis. Right lung is clear. Heart and pulmonary vascularity are normal. Gastrostomy tube.    Diagnosis:     Patient is a 69 yo man prolonged hypoxic respiratory failure in the setting of recent subarachnoid hemorrhage.    1. Acute respiratory failure s/p trach 6/7/2021  2. Pseudomonas acute tracheobronchitis started doris nebs  3. Encephalopathy   4. Subarachnoid bleed s/p craniotomy and clipping P-comm rupture aneurysm.   5. HTN  6. Malnourishment   7. Dysphagia s/p G tube    Recommendations/Plans (MDM):  1. Weaning orders: Continue phase 2, TM/PMV during the day full vent at night.  2. Trach change?  7 Bivona placed earlier this week.  3. Diagnostic testing? no  4. Pseudomonas pneumonia/airway colonization: Monitor clinically.  5. Continue pulmonary toiletting scheduled duonebs q6h, HS nebs two times a day    Kuldeep Steinberg MD

## 2021-07-07 NOTE — PROGRESS NOTES
Progress Notes by Kim Knox MD at 7/7/2021  7:59 AM     Author: Kim Knox MD Service: Hospitalist Author Type: Physician    Filed: 7/8/2021  7:53 AM Date of Service: 7/7/2021  7:59 AM Status: Addendum    : Kim Knox MD (Physician)    Related Notes: Original Note by Kim Knox MD (Physician) filed at 7/7/2021  9:51 AM          [facility] Hospitalist Daily Progress Note   Brief summary:  70 y.o. old male with past medical history of hypertension who presented to ED on 5/15/2021 for altered mental status. He had a fall and was found unresponsive by his wife. He was found to have acute subarachnoid hemorrhage.  On 5/15, he underwent left external ventricular drain placement.  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 One the same day, he also underwent craniotomy with clipping of PCOM aneurysm.  On 5/28/2021, patient had persistently elevated TCD, CT head showed bilateral BAYLEE infarct.  Next day, for persistent TCD and CTA showing bilateral BAYLEE vasospasm, was treated with milrinone and verapamil.  On 6/1/2021, angiogram showed no evidence of vasospasm.  He was extubated on 6/3/2021.On 6/6/2021, patient was reintubated due to drop in his saturation.  On 6/7/2021, patient underwent tracheostomy and PEG tube placement.  On 6/9/2021, EVD was removed.  Patient was initially started on Unasyn for possible pneumonia on 5/20/2021 which was switched to ceftriaxone the next day for sputum culture growing Klebsiella.  On 5/23, sputum culture also grew Pseudomonas for which ceftriaxone was switched to Zosyn.  Due to increased white blood cell counts, possibility of ventriculitis was raised however CSF was negative but antibiotic was switched to cefepime and vancomycin on 5/28/2021 with plan to continue for 2 weeks.  Vanco has been discontinued on 6/9/2021.  On 6/5/2021, sputum grew ESBL so cefepime was switched to meropenem.  He was transferred to [facility] on  6/11/21. Repeat sputum culture 6/19 and he was started on tobramycin nebulization. He has persistent tracheal secretions but improving.     6/14/21 CT head done for fatigue- read as slight increase in caliber of lateral and third ventricle  With possibility of developing communicating hydrocephalus. Discussed with Dr. Casillas (neurosurgeon at [facility]), who did not think there was much change, however suggested repeating CT head on 6/16/21 which did not show any change.     RT and pulmonary is following and pt is on phase 2 weaning with TM/PMV during day and full vent at night. Vent weaning slowed due to poor cough and secretions.Mental status is slowly improving. He is tolerating tube feeding.     He has severe debility and malnourishment and is progressing slowly and appears malnourishment, weakness is major factor in slow progression and at risk for set backs.    Assessment/Plan    Principal Problem:    SAH (subarachnoid hemorrhage) (H)  Active Problems:    Acute respiratory failure with hypoxia (H)    ESBL (extended spectrum beta-lactamase) producing bacteria infection    Attention to tracheostomy (H)    Acute and chronic respiratory failure with hypoxia (H)    On mechanically assisted ventilation (H)    Encounter for palliative care    Encephalopathy    Metabolic encephalopathy    Idiopathic hypertension    Oropharyngeal dysphagia    Moderate protein-calorie malnutrition (H)    Pseudomonas aeruginosa infection    Acute tracheobronchitis    Cerebrovascular accident (CVA) due to occlusion of cerebral artery (H)    Acute hypoxemic respiratory failure  S/p tracheostomy on 6/7/21, on MV  On phase 2 weaning TM/PMV during day and full vent at night, slow weaning due to poor cough and secretions  Continue PT/OT, bronchial hygiene  Continue vent weaning per pulmonary     Pseudomonas tracheobronchitis/colonization, Has persistent tracheal secretions but improving,   Continues on tobramycin nebulization for 2 weeks  Continue  pulmonary toilet, scheduled justine     Recent subarachnoid hemorrhage  Presented with fall and unresponsiveness on 5/15/2021.  S/p EVD, angiogram showing ruptured posterior communicating aneurysm followed by craniotomy with clipping of PCOM aneurysm on the same day.  EVD removed on 6/9/2021.   Postoperative period was complicated by vasospasm, bilateral BAYLEE infarct. MRI done on 5/28 showed right frontal parietal, temopral and left parasagittal parietal lobes infarcts.   On 6/1/2021, angiogram showed no vasospasm.  Will need to follow up with Dr. Martinez in Neurosurgery clinic in 3 months with repeat CTA of head and neck for post-operative follow up     Metabolic encephalopathy: multifactorial  On restraints  CT head 7/1 no new changes  Previous hospitalist discussed with neurology on 7/1/2021   Mental status appears to be flucutating  D/w NSG on call at U today, ok to try ritalin.   Will start low dose at 5 mg in AM and noon X2 days, if no improvement can increase the dose     Hypertension-stable  On lisinopril     Oropharyngeal dysphagia  On tube feeding via PEG tube, dietitian, speech following.      Inflamed possibly sebaceous cyst, left inguinal area,   Keflex 500 mg three times a day for 5 days, started on 7/5.  Needs outpatient Surgery evaluation for removal - can follow up for both inguinal hernia and cyst as outpatient    Moderate protein-calorie malnutrition  On tube feeding  RD following     Left inguinal hernia- Monitoring  Needs outpatient evaluation     Severe debility, weakness, critical illness, deconditioning, Continue PT/OT     DVT prophylaxis: Heparin sq  GI prophylaxis: Pepcid     Subjective:   Patient seen, examined.   Chart reviewed and events noted.  Sitting up in chair. More awake, alert. Denies any pain.      ROS  Unable to obtain.Please see subjective.     Objective:  Vital signs    Temp:  [96.8  F (36  C)-98.2  F (36.8  C)] 96.8  F (36  C)  Heart Rate:  [79-92] 87  Resp:  [18-28] 28  BP:  (112-132)/(64-93) 120/76  FiO2 (%):  [28 %-40 %] 40 %    Weight    Scheduled Meds:  ? cephalexin  500 mg Enteral Tube TID   ? famotidine  20 mg Enteral Tube BID   ? guar gum  1 packet Enteral Tube TID   ? heparin (PF) ANTICOAGULANT  5,000 Units Subcutaneous Q8H FIXED TIMES   ? ipratropium-albuteroL  3 mL Nebulization Q6H - RT   ? lisinopriL  20 mg Enteral Tube Daily   ? menthol-zinc oxide   Topical TID   ? methylphenidate HCl  5 mg Oral 2 times per day   ? sodium chloride  3 mL Nebulization BID   ? tobramycin  80 mg Nebulization Q8H - RT     Continuous Infusions:  PRN Meds:.acetaminophen **OR** acetaminophen, alteplase, bisacodyL, dextrose 50 % (D50W), docusate sodium, glucagon (human recombinant), hydrALAZINE, loperamide, magnesium hydroxide, naloxone **OR** naloxone **OR** naloxone **OR** naloxone, polyethylene glycol, sodium chloride 0.9%, sodium chloride  Intake/Output    I/O last 3 completed shifts:  In: 3355 [NG/GT:3355]  Out: 2775 [Urine:2775]     Physical Exam:       General Appearance:Chronically ill appearing , NAD  HEENT- NC  Neck- trach+   Lungs: Clear to auscultation bilaterally, diminished  Cardiovascular: RRR  Abdomen: Soft, non distended  Extremities:  no edema  Neurologic: Awake, alert.         Imaging: I have independently reviewed.     Lab Results: I have independently reviewed lab results.     All medication issues identified within the last 24 hours have been addressed and completed as appropriate.     Barriers to disposition:  Vent and trach weaning  Expected discharge- multiple days expected     Discussed with RN, MARY KATE and his wife Susy over the ipad in patients room.      Kim Knox MD  Hospitalist
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
## 2021-07-07 NOTE — PROGRESS NOTES
Patient chart reviewed.  Patient discussed in morning rounds.  Barriers to discharge: SLRx2.  Vent. Trach. HFTM. Frequent suction.  Phase II     Patient's exact discharge date, time, disposition TBD.  Patient is from home.  Patient is .  SW will continue to follow for psychosocial and emotional support of patient and family. SW to facilitate discharge to TCU when pt no longer requires LTACH level of care.    Dayron Chen, [facility]  [phone]

## 2021-07-07 NOTE — PLAN OF CARE
Problem: Mechanical Ventilation  Goal: Patient will maintain patent airway  Outcome: Progressing  Goal: Tracheostomy will be managed safely  Outcome: Progressing  Goal: Respiratory status - ventilation  Description: Movement of air in and out of the lungs.    Liberate from ventilator  Outcome: Progressing  RT PROGRESS NOTE     DATA:     CURRENT SETTINGS:  14/450/+5             TRACH TYPE / SIZE: #6 Malcolm (Placed 06/07)             MODE:  PRVC             FIO2:  24%     ACTION:             THERAPIES: Duo-neb Q6/Barry Q8/Saline BID             SUCTION:  Yes                         FREQUENCY: 6x                        AMOUNT:   Large                        CONSISTENCY: Thick/Normal                        COLOR:   White/Pale yellow             SPONTANEOUS COUGH EFFORT/STRENGTH OF EFFORT (not elicited by suctioning): Yes, strong productive cough.                              WEANING PHASE:   2                        WEAN MODE:  HFTM                        WEAN TIME:  11 hours and 47 minutes, 3 hours PMV                        END WEAN REASON: Full vent at noc per order.     RESPONSE:             BS:   Coarse             VITAL SIGNS: Sat 95-97%, HR 82-94, RR 14-24             EMOTIONAL NEEDS / CONCERNS:  None               RISK FOR SELF DECANNULATION:  Yes                        RISK DUE TO: Confusion                        INTERVENTION/S IN PLACE IS/ARE: Restraints x2       NOTE / PLAN:     Pt remains on full vent and tolerating well with no distress.  Pt continues to have excessive secretions and requires a frequent suctions.  Continue current care plan.

## 2021-07-07 NOTE — PLAN OF CARE
Problem: Mechanical Ventilation  Goal: Patient will maintain patent airway  Outcome: Progressing  Goal: Tracheostomy will be managed safely  Outcome: Progressing  Goal: Respiratory status - ventilation  Outcome: Progressing     RT PROGRESS NOTE     DATA:  CURRENT SETTINGS:   PRVC 14, 450, +5, 24% (S/B during daytime.)               TRACH TYPE / SIZE:  #6 Shiley DCT     ACTION:             THERAPIES:   Duonebs Q6.               SUCTION:   7-8x in 12hrs for mod-lg amts of clear/py secretions. Good, spont cough.                WEANING PHASE:   2 (Vent at night.)      TM (30L / 35%) wean started at 0730, is ongoing & reji well. Will return pt to full vent support by HS.      PMV also reji 9hr 5min ([id number]). Pt does minimal voicing. PMV removed & cuff inflated d/t desat.     RESPONSE:             BS:   Coarse / clear.             VITAL SIGNS:   HR 75-85, RR 22-24, Sats 100-92%.               EMOTIONAL NEEDS / CONCERNS:   Minimal.                RISK FOR SELF DECANNULATION:   Yes.                        RISK DUE TO:   Confusion.                        INTERVENTION/S IN PLACE IS/ARE:   Bilat wrist restraints.       NOTE / PLAN:   Continue resp support as needed & monitor closely.

## 2021-07-07 NOTE — PROGRESS NOTES
[facility] Daily Progress Note      Date of Service: 6/28/2021     Assessment and plan    Increase tracheal secretion  : Ongoing for weeks  ; Pulmonary on board  ; Sputum culture growing Pseudomonas  : Started on tobramycin from 6/26/2021  ; No leukocytosis  ; Repeat chest x-ray today did not show any infiltrate  ; Pulmonary follow-up: Spoke with pulmonary  ; Try to keep patient up in chair as possible    Acute metabolic encephalopathy  Considered multifactorial  ; Needing restraints  ; CT head on 6/14 show some possible increase in ventricular size however repeat CT on 6/16 did not show any acute changes  ; Was seen by neurology 6/18  ; Looks little more awake today, he is to be tracking eyes    Recent subarachnoid hemorrhage:   Presented with fall and unresponsiveness on 5/15/2021.  S/p EVD, angiogram showing ruptured posterior communicating aneurysm followed by craniotomy with clipping of PCOM aneurysm on the same day.  EVD removed on 6/9/2021.    Postoperative period was complicated by vasospasm, bilateral BAYLEE infarct. MRI done on 5/28 showed right frontal parietal, temopral and left parasagittal parietal lobes infarcts.    On 6/1/2021, angiogram showed no vasospasm.   Will need to follow up with Dr. Martinez in Neurosurgery clinic in 3 months with repeat CTA of head and neck for post-operative follow up    Hypernatremia: due to dehydration.  : Resolved  ; Sodium 143-141 139  ; Sodium 140 on 6/27/2021    Leukocytosis  Resolved     Acute hypoxic respiratory failure:   Initially postoperatively but later complicated by pneumonia.  On mechanical ventilation, tracheostomy done on 6/7/2021.    Vent management per RT and pulmonology.     Hospital acquired pneumonia: Sputum cultures from prior hospitalization showed Klebsiella, Pseudomonas.  Patient has been on antibiotics since 5/20/2021.  On 6/5/2021, was sputum grew ESBL for which patient is on meropenem, until 6/19/2021.    Left inguinal hernia  ; Monitor for signs  of any obstruction  ; Outpatient evaluation       Oropharyngeal dysphagia:   On tube feeding via PEG tube, dietitian, speech consult. Video swallow 6/14     Hypertension: On lisinopril     Moderate malnutrition     Hypokalemia - likely secondary to GI loss - resolved.  -On K protocol, RN to manage         This note was dictated using voice recognition software. Any grammatical or context distortions are unintentional and inherent to the software.  Subjective:   Patient seen at bedside, opens eyes  Patient appears to be tracking    Patient's wife and iPad    Seem to have significant secretion, was suctioned by RT, secretions appear whitish and clean  As per RT, patient needed multiple suctioning overnight    Wife concerned about continued secretions        Brief History: 70 y.o. old male with past medical history of hypertension who presented to ED on 5/15/2021 for altered mental status. He had a fall and was found unresponsive by his wife. He was found to have acute subarachnoid hemorrhage.  On 5/15, he underwent left external ventricular drain placement.  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 One the same day, he also underwent craniotomy with clipping of PCOM aneurysm.  On 5/28/2021, patient had persistently elevated TCD, CT head showed bilateral BAYLEE infarct.  Next day, for persistent TCD and CTA showing bilateral BAYLEE vasospasm, was treated with milrinone and verapamil.  On 6/1/2021, angiogram showed no evidence of vasospasm.  He was extubated on 6/3/2021.On 6/6/2021, patient was reintubated due to drop in his saturation.  On 6/7/2021, patient underwent tracheostomy and PEG tube placement.  On 6/9/2021, EVD was removed.  Patient was initially started on Unasyn for possible pneumonia on 5/20/2021 which was switched to ceftriaxone the next day for sputum culture growing Klebsiella.  On 5/23, sputum culture also grew Pseudomonas for which ceftriaxone was switched to Zosyn.  Due to increased  "white blood cell counts, possibility of ventriculitis was raised however CSF was negative but antibiotic was switched to cefepime and vancomycin on 5/28/2021 with plan to continue for 2 weeks.  Vanco has been discontinued on 6/9/2021.  On 6/5/2021, sputum grew ESBL so cefepime was switched to meropenem which he is on currently.   He was transferred to [facility] on 6[facility]11/21.     6/14/21 CT head done for fatigue- read as slight increase in caliber of lateral and third ventricle  With possibility of developing communicating hydrocephalus. Discussed with Dr. Casillas (neurosurgeon at [facility]), who did not think there was much change, however suggested repeating CT head on 6/16/21 which did not show any change.    Started on tobramycin inhaler from 6/26/2021    Chart reviewed, events noted. Pt seen and examined.     Objective     Vital signs in last 24 hours:  Temp:  [98.4  F (36.9  C)-98.9  F (37.2  C)] 98.9  F (37.2  C)  Heart Rate:  [80-91] 84  Resp:  [20-33] 27  BP: (109-125)/(65-77) 125/65  FiO2 (%):  [24 %-35 %] 30 %  Weight:   139 lb 1.6 oz (63.1 kg)  Weight change:   Body mass index is 19.4 kg/m .    Intake/Output last 3 shifts:  I/O last 3 completed shifts:  In: 3480 [NG/GT:3480]  Out: 2575 [Urine:2575]  Intake/Output this shift:  No intake/output data recorded.      Physical Exam:  /65 (Patient Position: Lying)   Pulse 84   Temp 98.9  F (37.2  C) (Axillary)   Resp 27   Ht 5' 11\" (1.803 m)   Wt 139 lb 1.6 oz (63.1 kg)   SpO2 95%   BMI 19.40 kg/m      FiO2 (%): 30 % O2 Device: Trach mask O2 Flow Rate (L/min): 10 L/min    General Appearance:   Lying in bed, eyes open, appears to be tracking   HEENT:    Normocephalic. Pupils equal and reactive. No scleral   Icterus. Moist oral mucosa   Healed surgical scar on scalp   Lungs:     Clear to auscultation bilaterally, respirations unlabored   Heart::    Regular rate and rhythm, S1 and S2 normal, no murmur, rub   or gallop. No peripheral edema.   Abdomen:   Soft, Non " tender. Non distended. Bowel sounds present.  G-tube in place   Extremity :  Left inguinal hernia   Pulses:   2+ and symmetric all extremities   CNS:  Awake, tracks eyes  No clear facial asymmetry         Scheduled Meds:    amino acids-protein hydrolys  1 packet Enteral Tube DAILY     famotidine  20 mg Enteral Tube BID     guar gum  1 packet Enteral Tube TID     heparin (PF) ANTICOAGULANT  5,000 Units Subcutaneous Q8H FIXED TIMES     insulin aspart (NovoLOG) injection   Subcutaneous Q6H FIXED TIMES     ipratropium-albuteroL  3 mL Nebulization Q6H - RT     [Held by provider] lisinopriL  20 mg Enteral Tube Daily     menthol-zinc oxide   Topical TID     sodium chloride  3 mL Nebulization BID     tobramycin  80 mg Nebulization Q8H - RT     Continuous Infusions:  PRN Meds:.acetaminophen **OR** acetaminophen, alteplase, bisacodyL, dextrose 50 % (D50W), docusate sodium, glucagon (human recombinant), hydrALAZINE, HYDROmorphone, magnesium hydroxide, naloxone **OR** naloxone **OR** naloxone **OR** naloxone, polyethylene glycol, sodium chloride 0.9%, sodium chloride    Lab Results:  personally reviewed.   not applicable  Recent Results (from the past 24 hour(s))   POCT Glucose    Collection Time: 06/27/21 11:41 AM    Specimen: Blood   Result Value Ref Range    Glucose 126 70 - 139 mg/dL   POCT Glucose    Collection Time: 06/27/21  5:47 PM    Specimen: Blood   Result Value Ref Range    Glucose 104 70 - 139 mg/dL   POCT Glucose    Collection Time: 06/27/21 11:49 PM    Specimen: Blood   Result Value Ref Range    Glucose 126 70 - 139 mg/dL   POCT Glucose    Collection Time: 06/28/21  5:27 AM    Specimen: Blood   Result Value Ref Range    Glucose 117 70 - 139 mg/dL   Potassium - Next AM    Collection Time: 06/28/21  6:31 AM   Result Value Ref Range    Potassium 4.7 3.5 - 5.0 mmol/L   HM1 (CBC with Diff)    Collection Time: 06/28/21  6:31 AM   Result Value Ref Range    WBC 7.3 4.0 - 11.0 thou/uL    RBC 3.10 (L) 4.40 - 6.20 mill/uL     Hemoglobin 9.6 (L) 14.0 - 18.0 g/dL    Hematocrit 29.8 (L) 40.0 - 54.0 %    MCV 96 80 - 100 fL    MCH 31.0 27.0 - 34.0 pg    MCHC 32.2 32.0 - 36.0 g/dL    RDW 14.6 (H) 11.0 - 14.5 %    Platelets 424 140 - 440 thou/uL    MPV 9.9 8.5 - 12.5 fL    Neutrophils % 73 (H) 50 - 70 %    Lymphocytes % 15 (L) 20 - 40 %    Monocytes % 8 2 - 10 %    Eosinophils % 2 0 - 6 %    Basophils % 1 0 - 2 %    Immature Granulocyte % 1 (H) <=0 %    Neutrophils Absolute 5.3 2.0 - 7.7 thou/uL    Lymphocytes Absolute 1.1 0.8 - 4.4 thou/uL    Monocytes Absolute 0.6 0.0 - 0.9 thou/uL    Eosinophils Absolute 0.2 0.0 - 0.4 thou/uL    Basophils Absolute 0.1 0.0 - 0.2 thou/uL    Immature Granulocyte Absolute 0.1 (H) <=0.0 thou/uL     Results from last 7 days   Lab Units 06/28/21  0527 06/27/21  2349 06/27/21  1747 06/27/21  1141 06/27/21  0519 06/26/21  2350 06/26/21  1943 06/26/21  1717 06/26/21  1204 06/26/21  0525   LN-POC GLUCOSE FINGERSTICK- HE mg/dL 117 126 104 126 113 123 121 111 142* 128           Advance Care Planning:   Barriers to discharge: Multiple issues  Anticipated discharge day: Few days  Disposition: Depending on PT Lori Hastings MD  [facility]  Hospitalist

## 2021-07-07 NOTE — PROGRESS NOTES
Clinical Nutrition Therapy Follow Up Note    RD talked to pt's wife who is very concerned about and frustrated with pt's weight due to the variability. RD explained that most weights have been 138-140 lb and that others around 145 lb are outliers. This variability could be due to not zeroing the bed or objects being on the bed like gel pillows.    Pt continues to have large secretions.    Current Nutrition Prescription:   Diet: TF, no tray with Osmolite 1.5 at 80 mL/hour and 350 mL FWF q 4 hours, 1 packet NC proSource daily, 1 packet Benefiber three times a day     Current Nutrition Intake:  Pt has a PEG tube which was placed 6/7/21.    Current TF regimen provides 1920 mL formula, 2988 kcals, 136 g protein, 404 g CHO, 94 g fat, 9 g fiber, 1463 mL fluid (total 3563 mL between formula and flushes).    Anthropometrics:  Admission weight: 145 lb per RD note on 6/12  Weight: 138 lb 3.2 oz (62.7 kg)-no source vs 139 lb-bed  Bedscale was zeroed on 6/21, and he weighed 139 lb. This suggests that this is his actual weight.   It is likely that weights in the mid 140s are inaccurate and caused by extra objects on the bed or the bed not being zeroed first.    The pt's wife reports that he was underweight even before being admitted to [facility]. He walked 2-3 miles a day and was not a big eater; it was a struggle for him to gain weight.    Per chart he weighed 156 lb 6/7/21 and 151 lb 6/11/21.     GI Status/Output:   BM x2 6/25, none recorded 6/26, x1 6/27 and x1 so far today.  BMs have been loose the last few days.  He is +15.2 L since 6/14/21.    Skin/Wound:  Red, blanchable buttocks/IT noted noted.    Medications:  Pepcid, novolog    Labs:  BUN 37-worse  Cr 0.65-worse  , 117, 125    Estimated Nutrition Needs:  Assessment weight is 63 kg, most accurate weight (as of 6/23) (81% IBW of 172 lb)     Energy Needs: 0455-8195 kcals daily, 45-50 kcal/kg (updated 6/28 since pt only maintaining wt at 40-45 kcal/kg)  Protein Needs:  + mg daily, 1.5-2+ g/kg   Fluid Needs: 3056-6021 mls daily, 25-30 mls/kg     Malnutrition: Noted previously-moderate    Nutrition dx:   Swallowing difficulty r/t respiratory failure as evidenced by requiring enteral nutrition-not progressing    Goal Status:  Meet estimated nutrition needs via TF-progressing  Gain weight (changed 6/28)-not progressing-wt is being maintained  Advance diet-not progressing    Intervention:  To promote weight gain in a pt who is 81% IBW, RD will increase TF rate to 90 mL/hour; continue using Osmolite 1.5. Cancel NC ProSource since this regimen will provide same amount of protein he's currently getting, but continue three packets Benefiber (one packet three times a day to help with loose stools). FWF should be 360 mL q 4 hours.  This regimen with modular will provide 2160 mL formula, 3288 kcals, 136 g protein, 453 g CHO, 106 g fat, 9 g fiber, 1645 mL fluid (total 3806 mL between formula and flushes, which is ~240 mL more per day than he is currently getting to help correct BUN).    Recommend ordering PRN Imodium to help with loose stools.    Wrote sticky note to nursing to zero bed before taking wt and remove extra objects such as gel pillows.    Monitoring/Evaluation:  TF tolerance, weight, labs, stooling, ability to advance diet    Odette Darby RD, LD

## 2021-07-07 NOTE — PLAN OF CARE
Problem: Mechanical Ventilation  Goal: Patient will maintain patent airway  Outcome: Progressing  Goal: Tracheostomy will be managed safely  Outcome: Progressing  Goal: Respiratory status - ventilation  Description: Movement of air in and out of the lungs.    Liberate from ventilator  Outcome: Progressing  RT PROGRESS NOTE     DATA:     CURRENT SETTINGS:  14/450/+5             TRACH TYPE / SIZE: #6 Malcolm (Placed 06/07)             MODE:  PRVC/AC             FIO2:  24%     ACTION:             THERAPIES: Duo-neb Q6/Barry Q8/Saline BID             SUCTION:                           FREQUENCY: 6x                        AMOUNT: Mod- Large                        CONSISTENCY: Thick/Normal                        COLOR:   White             SPONTANEOUS COUGH EFFORT/STRENGTH OF EFFORT (not elicited by suctioning): strong                               WEANING PHASE:   2                        WEAN MODE:  HFTM 10L/30%                        WEAN TIME: 12 hrs (PMV for 5hrs 30min on day shift)                        END WEAN REASON: back on full support for noc     RESPONSE:             BS:   Coarse             VITAL SIGNS: Sat 95-96%, HR 81-84, RR 25-27             EMOTIONAL NEEDS / CONCERNS:  None               RISK FOR SELF DECANNULATION:  Yes                        RISK DUE TO: Confusion                        INTERVENTION/S IN PLACE IS/ARE: Restraints x2       NOTE / PLAN:  Pt weaned on TM well, no respiratory distress noted at the end of the wean. Continue current plan of care

## 2021-07-07 NOTE — PLAN OF CARE
Problem: Pain  Goal: Patient's pain/discomfort is manageable  Outcome: Progressing   No sign /symptom of pain.  Problem: Safety  Goal: Patient will be injury free during hospitalization  Outcome: Progressing   Moderate to large amount of secretion suctioned from trach.  Problem: Daily Care  Goal: Daily care needs are met  Outcome: Progressing   Up in the chair for 2 hours; had physical therapy session. Large bowel movement x 1.  Problem: Risk of Injury Due to Unsafe Behavior  Goal: Patient will remain safe while in restraint; physical/psychological needs will be met  Outcome: Progressing   Restraint still necessary per staff evaluation secondary to probable accidental removal of tubes especially trach tube and lines.  Problem: Urinary Incontinence  Goal: Perineal skin integrity is maintained or improved  Outcome: Progressing   Good urine output; Condom catheter in place.  Problem: Chronic Tracheostomy  Goal: Patient is able to maintain stable respiratory status with long term  tracheostomy  Outcome: Progressing

## 2021-07-07 NOTE — PROGRESS NOTES
"Palliative Care Social Work Note    Assessment  Call made to Matt's wife, Susy with colleague Dr. Wilkerson. Susy expressed frustration that she has not seen any improvement for Matt. She continues to connect with him via IPAD daily and \"does not get anything\" in terms of interaction from him.  She hears him talk to medical staff, but not to her.  Acknowledged how hard that must be for her as she so desperately wants to \"have a glimmer of hope.\" She appreciates being able to see him on the IPAD as it is difficult to drive to the hospital during the week. She will visit this weekend w/ dtr Yolanda.     She has a lot of concerns about his present functioning and what life will be in the future for him.  She mentioned that he told her when at the [facility], \"you got me into this so you need to get me out of it.\" She isn't sure what was meant by this, but the comment has been on her mind. She feels like he does not have quality of life at this time, but also doesn't want to give up on him. She doesn't think he would want to live in a nursing home if in a \"vegetative state\", but she feels he would want a chance at living. Discussed CODE status and it is her wish that he remains FULL CODE at this time. Also discussed what hospice/comfort care could look like if needed/desired in future. She wonders how much time is needed to see if he will make a recovery.     Explored Susy's coping and support system. She connects with her dtr regularly, but otherwise stays home and does things around the house. She has a strong elaina but feels that  \"her prayers aren't being listened to.\" Offered  support, but she declined. Continued calls from PC SW are appreciated and will continue.    Clinical Social Work Interventions  Build trust/rapport  Active/empathetic listening   Validation of feelings   Provided emotional support     Plan of care   PC SW will continue to reach out via phone to offer emotional support to Susy.     Josephine " Ashlie [facility]  Palliative Care   Office # [phone]

## 2021-07-07 NOTE — PLAN OF CARE
Plan of Care by Kayla Small LRT at 7/7/2021  2:39 AM     Author: Kayla Small LRT Service: -- Author Type: Respiratory Therapist    Filed: 7/7/2021  2:54 AM Date of Service: 7/7/2021  2:39 AM Status: Signed    : Kayla Small LRT (Respiratory Therapist)       RT PROGRESS NOTE    DATA  CURRENT SETTINGS:   Vent Mode: PRVC A/C  FiO2 (%):  [28 %-30 %] 30 %  S RR:  [14] 14  S VT:  [450 mL] 450 mL  PEEP/CPAP (cm H2O):  [5 cm H2O] 5 cm H2O  Minute Ventilation (L/min):  [9.7 L/min-11.2 L/min] 11.2 L/min  PIP:  [8 cm H2O-13 cm H2O] 13 cm H2O  MAP (cm H2O):  [6-7] 7   AIRWAY: 7.0 Cuffed Bivona     ACTION  THERAPIES: Q6 Duoneb; Q8 Tobramycin; two times a day NaCl 3%  SUCTION:   Frequency - Q3-4 Hours   Amount - moderate to large   Consistency - thick   Color - white/clear   Spontaneous Cough/Effort - good/productive     WEANING:    Wean Phase #2   Wean Mode - High Flow to Trach/PMV   Wean Time - Over 14 Hours   End Wean Reason - Vent @ Noc    RESPONSE  BS: Coarse  VITAL SIGNS:   Temp:  [97.9  F (36.6  C)-98.9  F (37.2  C)] 98.2  F (36.8  C)  Heart Rate:  [77-92] 92  Resp:  [18-26] 25  BP: (112-132)/(64-93) 112/67  SpO2:  [85 %-100 %] 98 %  FiO2 (%):  [28 %-30 %] 30 %   EMOTIONAL CONCERNS: N/A  RISK FOR SELF DECANNULATION: Yes; Mitt restraints in place     NOTE -- Patient resting on full ventilatory support overnight and tolerating nebulizer treatments well. Will return to high flow to trach with PMV in the morning. RT will continue to monitor.     Kayla Small    Problem: Mechanical Ventilation  Goal: Patient will maintain patent airway  Outcome: Progressing  Goal: Tracheostomy will be managed safely  Outcome: Progressing  Goal: Respiratory status - ventilation  Description: Movement of air in and out of the lungs.    Liberate from ventilator  Outcome: Progressing     Problem: Chronic Tracheostomy  Goal: Patient is able to maintain stable respiratory status with long term  tracheostomy  Outcome:  Progressing

## 2021-07-07 NOTE — PROGRESS NOTES
"Palliative Care Social Work Note    Assessment   Visited with pt in room and wife, Susy and daughter Yolanda on IPAD. Pt opened eyes at times and tried to waxplored their coping and both are taking it \"day by day.\" Yolanda gets together with friends and takes walks. Susy \"stays at her post\" a watches Matt on the IPAD much of the day. Susy appears less discouraged during our conversation today, but still is waiting eagerly for Matt to show more solid signs of improvement. ve his hand. He mouthed a few words, but unable to understand what he was saying. Family shared he was more alert yesterday which was nice to see. He is making small improvements but \"it is hard to know.\" They didn't have any questions or concerns at the end of our visit. They expressed appreciation for support from Palliative Care. Encouraged them to call if any needs arise.    Clinical Social Work Interventions  Active/empathetic listening  Building trust/rapport  Facilitated processing of emotions  Validation of feelings   Emotional support     Plan of care  Updated team.  PC SW remains available to provide psychosocial/emotional support to Matt and his family.     Josephine Dailey, [facility]  Palliative Care   Office # [phone]    "

## 2021-07-07 NOTE — PROGRESS NOTES
Pulmonary Medicine Follow up Note - Ventilator Rounds:    Clinical status discussed today with RN and respiratory therapist..    Chief complaint: Ongoing respiratory failure  Significant change in clinical status during past 24 hours? - increase trach secretions      CURRENT SETTINGS:             TRACH TYPE / SIZE:  6 Shiley placed 6-7-21.              MODE:   PRVC 14/450/+5/24%    Tracheal secretions: moderate to copious amount  Current phase of ventilator weaning pathway:  Phase 2  Ventilator weaning results from yesterday: Tolerating weaning trial     Physical exam   Vitals:    06/25/21 1105 06/25/21 1404 06/25/21 1556 06/25/21 1605   BP:   115/71    Patient Position:   Lying    Pulse: 75 80 87 85   Resp: 24 22 22 24   Temp:   97.8  F (36.6  C)    TempSrc:   Axillary    SpO2: 93% 97% 95% 96%   Weight:       Height:         Gen: lethargic, arousable, trach  HEENT: pale conjunctiva, moist mucosa  Neck: s/p trach   Lungs: ronchi both HT  CV: regular, no murmurs or gallops appreciated  Abdomen: soft, NT, BS wnl  Ext: no edema  Neuro: lethargic arousable, tone is increase throughout    Results from last 7 days   Lab Units 06/25/21  0542   LN-WHITE BLOOD CELL COUNT thou/uL 8.4   LN-HEMOGLOBIN g/dL 9.5*   LN-HEMATOCRIT % 30.0*   LN-PLATELET COUNT thou/uL 378     Results from last 7 days   Lab Units 06/25/21  0542   LN-SODIUM mmol/L 138   LN-POTASSIUM mmol/L 4.4   LN-CHLORIDE mmol/L 101   LN-CO2 mmol/L 28   LN-BLOOD UREA NITROGEN mg/dL 34*   LN-CREATININE mg/dL 0.68*   LN-CALCIUM mg/dL 9.9     Culture 4+ Pseudomonas aeruginosaAbnormal     Reported and sent to MD as part of the  Emerging Infections Surveillance Program.          Gram Stain Result  4+ Polymorphonuclear leukocytes      2+ Gram negative bacilli            Resulting Agency: [facility]   Susceptibility     Pseudomonas aeruginosa     SUSAN     Aztreonam >16  Resistant     Cefepime >16  Resistant     Ceftazidime >16  Resistant     Ciprofloxacin 2  Resistant      Gentamicin <=2  Sensitive     Levofloxacin 4  Resistant     Meropenem >8  Resistant     Piperacillin + Tazobactam >64/4  Resistant     Tobramycin <=2  Sensitive         XR CHEST 1 VIEW PORTABLE  LOCATION: [facility]  DATE/TIME: 6/18/2021 4:37 PM     INDICATION: increased secretions  COMPARISON: 06/14/2021, 06/09/2021     IMPRESSION:   Tracheostomy tube in place. Left basilar opacities partially silhouetting the left hemidiaphragm are unchanged and again presumed to reflect some atelectasis. Right lung is clear. Heart and pulmonary vascularity are normal. Gastrostomy tube.    Diagnosis:     Patient is a 71 yo man with history of hypertension. Patient initially presented on 15-May-2021 to [facility] after being found unresponsive by his wife. Patient was then found to have a subarachnoid hemorrhage secondary to a ruptured aneurysm. Patient was intubated and was started on mannitol, IV antihypertensives and hyperventilation and patient was then transferred to [facility]. Patient underwent aneurysm clipping as well as placement of extra-ventricular drain from hydrocephalus on 15-May-2021. Extra-ventricular drain would malfunction and would require replacement on 04-Jun-2021 and 06-Jun-2021. Extra-ventricular drain reportedly was removed on 09-Jun-2021. Patient had intra-arterial vasospasm treatment with verapamil on 28-May-2021.  Patient was extubated on 16-May-2021 but had to be reintubated on 19-May-2021. Patient would be extubated on 03-Jun-2021 but would be reintubated on 06-Jun-2021. Patient had tracheostomy and PEG-tube placed on 07-Jun-2021. Patient required treatment for hospital-acquired pneumonia. Patient was transferred to LTAC on 11-Jun-2021.    1. Acute respiratory failure s/p trach 6/7/2021  2. Pseudomonas acute tracheobronchitis   3. Encephalopathy   4. Subarachnoid bleed s/p craniotomy and clipping P-comm rupture aneurysm.    5. HTN  6. Malnourishment   7. Dysphagia s/p G tube    Recommendations/Plans (MDM):  1. Weaning orders: continue phase 2 weaning trial   2. Trach change? no  3. Diagnostic testing? no  4. Start antibiotics RUTH nebs for 8 days  5. Continue pulmonary toiletting scheduled albuterol and saline nebs  6. DVT prophylaxis heparin SQ    Marcelino Navarro  Pulmonary / Critical Care  6/25/2021   4:27 PM

## 2021-07-07 NOTE — PLAN OF CARE
"  Problem: Mechanical Ventilation  Goal: Patient will maintain patent airway  Outcome: Progressing  Goal: Tracheostomy will be managed safely  Outcome: Progressing  Goal: Respiratory status - ventilation  Description: Movement of air in and out of the lungs.    Liberate from ventilator  Outcome: Progressing   RT PROGRESS NOTE     DATA:     CURRENT SETTINGS:             TRACH TYPE / SIZE:  #6 shiley placed 6/7             MODE:   prvc/ac 14, 450, peep 5, 24%                  ACTION:             THERAPIES:   duoneb q6h, saline two times a day,doris q8h             SUCTION:                           FREQUENCY:   x6                        AMOUNT:   moderate to large white to pale yellow                        CONSISTENCY:   normal                                     SPONTANEOUS COUGH EFFORT/STRENGTH OF EFFORT (not elicited by suctioning): moderate                              WEANING PHASE:   2                        WEAN MODE:    Pt started 10L and 28% tm at 09:05.                        WEAN TIME:   pt was able to wear pmv for 5 hours and 25 minutes.  Pt did have an episode of desaturation when suction was required.  Pt remains on tm at this time.                             RESPONSE:             BS:   coarse and diminished             VITAL SIGNS:   Blood pressure 109/69, pulse 80, temperature 98.4  F (36.9  C), temperature source Axillary, resp. rate 22, height 5' 11\" (1.803 m), weight 143 lb 8 oz (65.1 kg), SpO2 95 %.                 EMOTIONAL NEEDS / CONCERNS:  no                RISK FOR SELF DECANNULATION:  yes                        RISK DUE TO:  confused                        INTERVENTION/S IN PLACE IS/ARE:  bilateral wrist restraints       NOTE / PLAN:   Continue current plan.      "

## 2021-07-07 NOTE — PLAN OF CARE
Plan of Care by Gail Palmer RN at 7/7/2021  6:41 AM     Author: Gail Palmer RN Service: -- Author Type: Registered Nurse    Filed: 7/7/2021  6:44 AM Date of Service: 7/7/2021  6:41 AM Status: Signed    : Gail Palmer RN (Registered Nurse)         Problem: Safety  Goal: Patient will be injury free during hospitalization  Outcome: Progressing     Problem: Daily Care  Goal: Daily care needs are met  Outcome: Progressing     Problem: Mechanical Ventilation  Goal: Patient will maintain patent airway  Outcome: Progressing    Patient remain stable, was suction x 2 moderate white thick secretion. Patient continue on restraint hand mitt  x 2 for safety and  no sign of injury noted.

## 2021-07-07 NOTE — PROGRESS NOTES
Hospitalist Progress Note    Assessment/Plan    A: Patient is a 69 yo man who has hypertension. Patient initially presented on 15-May-2021 to [facility] after being found unresponsive by his wife. Patient was then found to have a subarachnoid hemorrhage secondary to a ruptured aneurysm. Patient was intubated and was started on mannitol, IV antihypertensives and hyperventilation and patient was then transferred to [facility]. Patient underwent aneurysm clipping as well as placement of extra-ventricular drain from hydrocephalus on 15-May-2021. Extra-ventricular drain would malfunction and would require replacement on 04-Jun-2021 and 06-Jun-2021. Extra-ventricular drain reportedly was removed on 09-Jun-2021. Patient had intra-arterial vasospasm treatment with verapamil on 28-May-2021.     Patient was extubated on 16-May-2021 but had to be reintubated on 19-May-2021. Patient would be extubated on 03-Jun-2021 but would be reintubated on 06-Jun-2021. Patient had tracheostomy and PEG-tube placed on 07-Jun-2021. Patient has required treatment for hospital-acquired pneumonia in the past. Patient was transferred to LTAC on 11-Jun-2021.      There is concern for ongoing Pseudomonas tracheobronchitis. There was no epileptiform activity on EEG.     P:  1.) Acute hypoxemic respiratory failure: Patient has tracheostomy in place. Monitoring respiratory status. Pulmonology seeing.  2.) Pseudomonas tracheobronchitis: On nebulized tobramycin.  3.) Recent subarachnoid hemorrhage: Physical, occupational and speech therapy as able.  4.) Metabolic encephalopathy: Monitoring for safety.  5.) Hypertension: Currently controlled without medications. Monitoring.  6.) Oropharyngeal dysphagia: Patient on tube feeds.   7.) Moderate protein-calorie malnutrition: Supplementing as able.  8.) Patient's wife was updated. Questions answered to the best of my ability.      Principal Problem:    SAH (subarachnoid  hemorrhage) (H)  Active Problems:    Acute respiratory failure with hypoxia (H)    ESBL (extended spectrum beta-lactamase) producing bacteria infection    Tracheostomy care (H)    Acute and chronic respiratory failure with hypoxia (H)    On mechanically assisted ventilation (H)    Encounter for palliative care    Encephalopathy    Metabolic encephalopathy    Idiopathic hypertension    Oropharyngeal dysphagia    Moderate protein-calorie malnutrition (H)    Pseudomonas aeruginosa infection    Acute tracheobronchitis        Subjective    Patient was not answering questions.    Objective    Vital signs in last 24 hours  Temp:  [98  F (36.7  C)-99.6  F (37.6  C)] 98.4  F (36.9  C)  Heart Rate:  [75-94] 80  Resp:  [14-26] 22  BP: (104-114)/(61-72) 109/69  FiO2 (%):  [24 %-38 %] 30 % 95% O2 Device: Trach mask O2 Flow Rate (L/min): 10 L/min  Weight:   143 lb 8 oz (65.1 kg) Weight change:     Intake/Output last 3 shifts  I/O last 3 completed shifts:  In: 1750 [NG/GT:1750]  Out: 1750 [Urine:1750]  Body mass index is 20.01 kg/m .    Physical Exam    General:     Patient comfortable, NAD.   HEENT:     No scleral icterus or conjunctival injection.   Heart:    RRR, S1 S2 w/o murmurs.   Lungs:     Breath sounds present, coarsened.   Abdomen:   Soft.     Pertinent Labs   Lab Results: personally reviewed.   Results from last 7 days   Lab Units 06/27/21  0601 06/26/21  0547 06/25/21  0542   LN-SODIUM mmol/L 140 138 138   LN-POTASSIUM mmol/L 4.4 4.4 4.4   LN-CHLORIDE mmol/L 101 100 101   LN-CO2 mmol/L 29 28 28   LN-BLOOD UREA NITROGEN mg/dL 33* 35* 34*   LN-CREATININE mg/dL 0.68* 0.70 0.68*   LN-CALCIUM mg/dL 10.3 9.7 9.9     Results from last 7 days   Lab Units 06/25/21  0542 06/23/21  0543 06/21/21  0619   LN-WHITE BLOOD CELL COUNT thou/uL 8.4 6.2 8.1   LN-HEMOGLOBIN g/dL 9.5* 9.2* 9.1*   LN-HEMATOCRIT % 30.0* 28.7* 28.8*   LN-PLATELET COUNT thou/uL 378 351 284   LN-NEUTROPHILS RELATIVE PERCENT % 76* 72* 76*   LN-MONOCYTES RELATIVE  PERCENT % 7 8 7         Results from last 7 days   Lab Units 06/27/21  0519 06/26/21  2350 06/26/21  1943 06/26/21  1717 06/26/21  1204 06/26/21  0525 06/25/21  2313 06/25/21  1754 06/25/21  1155 06/25/21  0526   LN-POC GLUCOSE FINGERSTICK- HE mg/dL 113 123 121 111 142* 128 123 122 107 119       Medications  Scheduled Meds:    amino acids-protein hydrolys  1 packet Enteral Tube DAILY     famotidine  20 mg Enteral Tube BID     guar gum  1 packet Enteral Tube TID     heparin (PF) ANTICOAGULANT  5,000 Units Subcutaneous Q8H FIXED TIMES     insulin aspart (NovoLOG) injection   Subcutaneous Q6H FIXED TIMES     ipratropium-albuteroL  3 mL Nebulization Q6H - RT     Lactobacillus rhamnosus GG  1 capsule Enteral Tube Daily with brkfst     [Held by provider] lisinopriL  20 mg Enteral Tube Daily     menthol-zinc oxide   Topical TID     sodium chloride  3 mL Nebulization BID     tobramycin  80 mg Nebulization Q8H - RT     Continuous Infusions:  PRN Meds:.acetaminophen **OR** acetaminophen, alteplase, bisacodyL, dextrose 50 % (D50W), docusate sodium, glucagon (human recombinant), hydrALAZINE, HYDROmorphone, magnesium hydroxide, naloxone **OR** naloxone **OR** naloxone **OR** naloxone, polyethylene glycol, sodium chloride 0.9%, sodium chloride

## 2021-07-07 NOTE — PROGRESS NOTES
Pulmonary Medicine Follow up Note - Ventilator Rounds:    Clinical status discussed today with RN and respiratory therapist..    Chief complaint: Ongoing respiratory failure  Significant change in clinical status during past 24 hours? - increase trach secretions      CURRENT SETTINGS:             TRACH TYPE / SIZE:  6 Shiley placed 6-7-21.              MODE:   PRVC 14/450/+5/24%    Tracheal secretions: overnight: x6, large-mod thick/normal, white.  Strong cough.     Current phase of ventilator weaning pathway:  Phase 2  PMV 5:30 on days,   Phase 2 HFTM 20Lpm/30% today  Full vent at night.       Physical exam   Vitals:    06/28/21 0744 06/28/21 0818 06/28/21 0836 06/28/21 1155   BP: 125/65      Patient Position: Lying      Pulse: 84 86 84    Resp: 27      Temp: 98.9  F (37.2  C)      TempSrc: Axillary      SpO2: 97% 96% 95% 95%   Weight:       Height:         Gen: cachectic. No distress  HEENT: pale conjunctiva, moist mucosa  Neck: trach midline, dressing c/d/i  Lungs: diminished ant no wheezing or rhonchi.   CV: regular, no murmurs or gallops appreciated  Abdomen: soft, NT, BS wnl  Ext: no edema  Neuro: lethargic arousable, tone is increase throughout    Results from last 7 days   Lab Units 06/28/21  0631   LN-WHITE BLOOD CELL COUNT thou/uL 7.3   LN-HEMOGLOBIN g/dL 9.6*   LN-HEMATOCRIT % 29.8*   LN-PLATELET COUNT thou/uL 424     Results from last 7 days   Lab Units 06/28/21  0631 06/27/21  0601   LN-SODIUM mmol/L  --  140   LN-POTASSIUM mmol/L 4.7 4.4   LN-CHLORIDE mmol/L  --  101   LN-CO2 mmol/L  --  29   LN-BLOOD UREA NITROGEN mg/dL  --  33*   LN-CREATININE mg/dL  --  0.68*   LN-CALCIUM mg/dL  --  10.3     Culture 4+ Pseudomonas aeruginosaAbnormal     Reported and sent to [facility] as part of the  Emerging Infections Surveillance Program.          Gram Stain Result  4+ Polymorphonuclear leukocytes      2+ Gram negative bacilli            Resulting Agency: [facility]   Susceptibility     Pseudomonas aeruginosa     SUSAN      Aztreonam >16  Resistant     Cefepime >16  Resistant     Ceftazidime >16  Resistant     Ciprofloxacin 2  Resistant     Gentamicin <=2  Sensitive     Levofloxacin 4  Resistant     Meropenem >8  Resistant     Piperacillin + Tazobactam >64/4  Resistant     Tobramycin <=2  Sensitive         XR CHEST 1 VIEW PORTABLE  LOCATION: [facility]  DATE/TIME: 6/18/2021 4:37 PM     INDICATION: increased secretions  COMPARISON: 06/14/2021, 06/09/2021     IMPRESSION:   Tracheostomy tube in place. Left basilar opacities partially silhouetting the left hemidiaphragm are unchanged and again presumed to reflect some atelectasis. Right lung is clear. Heart and pulmonary vascularity are normal. Gastrostomy tube.    Diagnosis:     Patient is a 71 yo man with history of hypertension. Patient initially presented on 15-May-2021 to [facility] after being found unresponsive by his wife. Patient was then found to have a subarachnoid hemorrhage secondary to a ruptured aneurysm. Patient was intubated and was started on mannitol, IV antihypertensives and hyperventilation and patient was then transferred to [facility]. Patient underwent aneurysm clipping as well as placement of extra-ventricular drain from hydrocephalus on 15-May-2021. Extra-ventricular drain would malfunction and would require replacement on 04-Jun-2021 and 06-Jun-2021. Extra-ventricular drain reportedly was removed on 09-Jun-2021. Patient had intra-arterial vasospasm treatment with verapamil on 28-May-2021.  Patient was extubated on 16-May-2021 but had to be reintubated on 19-May-2021. Patient would be extubated on 03-Jun-2021 but would be reintubated on 06-Jun-2021. Patient had tracheostomy and PEG-tube placed on 07-Jun-2021. Patient required treatment for hospital-acquired pneumonia. Patient was transferred to LTAC on 11-Jun-2021.    1. Acute respiratory failure s/p trach 6/7/2021  2. Pseudomonas acute  tracheobronchitis started doris nebs  3. Encephalopathy   4. Subarachnoid bleed s/p craniotomy and clipping P-comm rupture aneurysm.   5. HTN  6. Malnourishment   7. Dysphagia s/p G tube    Recommendations/Plans ([facility]):  1. Weaning orders: continue phase 2, TM/PMV during the day full vent at night.   2. Trach change? no  3. Diagnostic testing? no  4. Cont doris neb abx for 8 days  5. Continue pulmonary toiletting scheduled duonebs q6h, HS nebs two times a day  Will consider therapeutic bronch in the next day or so if secretions become problematic or remains hypoxemic.   6. DVT prophylaxis heparin subcutaneous    Discussed extensively with patient's wife and daughter over the iPad.   She has many concerns about nutrition, PT which I asked her to discuss with the hospitalist.     Isael (Jone Jane MD  [facility] Pulmonary & Critical Care  Pager [phone]  Clinic [phone]

## 2021-07-07 NOTE — PLAN OF CARE
Problem: Mechanical Ventilation  Goal: Patient will maintain patent airway  Outcome: Progressing  Goal: Tracheostomy will be managed safely  Outcome: Progressing  Goal: Respiratory status - ventilation  Description: Movement of air in and out of the lungs.    Liberate from ventilator  Outcome: Progressing   RT PROGRESS NOTE     DATA:     CURRENT SETTINGS:  PRVC/AC 14, 450, +5, 35%             TRACH TYPE / SIZE: # 6 SHIPRANAV DCT  Placed  On 6/7/21             MODE:  PRVC/AC             FIO2:  35%     ACTION:             THERAPIES: Duoneb q6hr , sodium chloride two times a day, Tobramycin neb q8              SUCTION:                           FREQUENCY: X 10                        AMOUNT large                          CONSISTENCY:thin                      COLOR: white             SPONTANEOUS COUGH EFFORT/STRENGTH OF EFFORT (not elicited by suctioning): weak                              WEANING PHASE: 2                        WEAN MODE:38% HFTM @30 L/min                         WEAN TIME: SINCE 09:25 on hftm. pmv on for 3h                        END WEAN REASON:ongoing      RESPONSE:             BS:  Decreased coarse              VITAL SIGNS: sat 95-97%, HR 78-82, RR 22             EMOTIONAL NEEDS / CONCERNS:  no             RISK FOR SELF DECANNULATION:  no                        RISK DUE TO:                          INTERVENTION/S IN PLACE IS/ARE:        NOTE / PLAN:  Cont. Current plan / Patient had PMV  On for 3h only due to an                                          increased amount of secretions today. When family left   removed PMV and inflated cuff of trach

## 2021-07-07 NOTE — PLAN OF CARE
Problem: Pain  Goal: Patient's pain/discomfort is manageable  Outcome: Progressing     Problem: Safety  Goal: Patient will be injury free during hospitalization  Outcome: Progressing     Problem: Daily Care  Goal: Daily care needs are met  Outcome: Progressing     Problem: Risk of Injury Due to Unsafe Behavior  Goal: Patient will remain safe while in restraint; physical/psychological needs will be met  Outcome: Progressing   Patient was up in his wheelchair for a little over 2 hours and had occupational therapy at this time. Signs and symptoms within normal limits. Still on restraints for safety. Still with moderate amounts of thick whitish secretions from trach. Wife and daughter visited.

## 2021-07-07 NOTE — PLAN OF CARE
Plan of Care by Amanda Jean RN at 7/6/2021 10:51 PM     Author: Amanda Jean RN Service: -- Author Type: Registered Nurse    Filed: 7/6/2021 10:53 PM Date of Service: 7/6/2021 10:51 PM Status: Signed    : Amanda Jean RN (Registered Nurse)         Problem: Daily Care  Goal: Daily care needs are met  Outcome: Progressing   Patient had a satisfactory day, alert, talking with staff and cooperative with all cares. Patient denied pain.

## 2021-07-07 NOTE — PLAN OF CARE
Problem: Mechanical Ventilation  Goal: Patient will maintain patent airway  Outcome: Progressing  Goal: Tracheostomy will be managed safely  Outcome: Progressing  Goal: Respiratory status - ventilation  Description: Movement of air in and out of the lungs.    Liberate from ventilator  Outcome: Progressing  RT PROGRESS NOTE     DATA:     CURRENT SETTINGS:  14/450/+5             TRACH TYPE / SIZE: #6 Malcolm (Placed 06/07)             MODE:  PRVC             FIO2:   24%     ACTION:             THERAPIES: Duo-neb Q6/Saline BID/Barry Q8             SUCTION: Yes                         FREQUENCY: 6x                        AMOUNT:   Moderate x4 to Large x2                        CONSISTENCY: Thick                        COLOR:  White to Pale yellow             SPONTANEOUS COUGH EFFORT/STRENGTH OF EFFORT (not elicited by suctioning): Yes, strong productive cough.                              WEANING PHASE:   2                        WEAN MODE:  HFTM                        WEAN TIME:  12 hours and 56 minutes                        END WEAN REASON: Full vent at noc     RESPONSE:             BS:   Coarse             VITAL SIGNS: Sat 95-97%, HR 77-85, RR 17-26             EMOTIONAL NEEDS / CONCERNS:  None                RISK FOR SELF DECANNULATION:  Yes                        RISK DUE TO:  Confusion                        INTERVENTION/S IN PLACE IS/ARE: Restraints x2       NOTE / PLAN:     Pt remains on full vent and tolerating well with no distress.  Continue current care plan.

## 2021-07-07 NOTE — PROGRESS NOTES
Speech Language/Pathology  Speech Therapy Daily Progress Note    Patient presents as alert and cooperative during this session.  An  was not applicable. Family present via IPad. Pt responded to some personal/environmental questions (mostly automatic/reflexive in nature) re: how he was.     Objective  Verbal expression: to address impairment in communication of needs, wants, and ideas    Automatic expression task was 40 % accurate given moderate cues for counting #s 1-10. Produced 2-5, but then did not proceed despite max prompts.     Completed automatic phrase completion with 60% accuracy independently. Did not increase despite max verbal and tactile cues.     Patient repeated short phrase after model in 1 of 2 opportunities, did not increase to 2 of 2 despite max encouragement.     Notable for significantly reduced vocal volume. Minimal increase despite max verbal cues, tactile cues and education re: diaphragmatic breath support.     Assessment  Demonstrates some improved responsiveness today, but still variable. Suspect deficits in attention play a large role in his ability to complete tasks and participate. Patient did endorse this at one point but questionable reliability. Educated family re: progress, plan.     Plan/Recommendations  Continue per plan    The  Care Plan has been reviewed and current plan remains appropriate.    25 speech/language minutes     David Coffman MA, CCC-SLP

## 2021-07-07 NOTE — PROGRESS NOTES
NEUROLOGY PROGRESS NOTE     ASSESSMENT & PLAN   Hospital Day#: 15  Visit diagnosis: Encephalopathy    Encephalopathy  Subarachnoid hemorrhage  BAYLEE stroke    Recent head CT shows stable ventricles.  There is broad area of evolving infarction in the anterior paramedian right frontal lobe with a small amount of petechial blood products  There is also a paramedian superior left frontal lobe subacute infarct  There is scattered subarachnoid hemorrhage in the cerebral sulci  Electrolytes, UA, ammonia noncontributory  EEG negative for ongoing seizures.  Suspect toxic metabolic encephalopathy with superimposed sundowning.  Sundowning precautions.  Encourage sleep at night and staying awake during the daytime  Exam seems to be slowly improving versus fluctuation depending on when the exam was done.    We will follow intermittently.  Please call with any further questions.  Dr. Hernandez covering next week.    Neurology Discharge Planning :  TBD    Patient Active Problem List    Diagnosis Date Noted     Pseudomonas aeruginosa infection      Acute tracheobronchitis      Metabolic encephalopathy      Idiopathic hypertension      Oropharyngeal dysphagia      Moderate protein-calorie malnutrition (H)      Encephalopathy      Encounter for palliative care      Tracheostomy care (H)      Acute and chronic respiratory failure with hypoxia (H)      On mechanically assisted ventilation (H)      ESBL (extended spectrum beta-lactamase) producing bacteria infection      Acute respiratory failure with hypoxia (H)      SAH (subarachnoid hemorrhage) (H) 06/11/2021     Past Medical History: Patient  has no past medical history on file.     SUBJECTIVE     Dr. Gaona note:  Dayron is a 70-year-old man seen at [facility] LT[facility] unit. He has a complex neurologic history recently. He initially presented on May 15 after he was found unresponsive by his wife. Evaluation showed acute subarachnoid hemorrhage. He had left extraventricular drain  placed. 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 Subsequently he had elevated velocities on transcranial Dopplers and developed anterior cerebral artery infarcts, right worse than left. He failed extubation and was reintubated, he had trach and PEG placed. Extraventricular drain was removed. He is now transferred here to [facility] for further management. He has been weaning from the ventilator a bit, and presently is on CPAP. On Saturday he was more responsive, he was following commands nicely with occupational therapy.    6/22/21  Patient seen and case discussed with the family.  Patient appears to be more alert to me compared to previous examination described.  Family feels he is regressing.  It appears that he sleeping during the daytime and staying awake at nighttime.  Discussed that there might be some sundowning going on.  His examination does not show any focal deficits.  Reassured the family that there is no new damage that is happening at this point.  If this is mainly sundowning/encephalopathy it should improve with time.  EEG has not been done so far.    6/25  EEG done.  EEG negative for seizures.  Patient's exam is slowly improve continues to have intermittent agitation and putting on restraints.     OBJECTIVE     Vital signs in last 24 hours  Temp:  [97.7  F (36.5  C)-99.4  F (37.4  C)] 97.7  F (36.5  C)  Heart Rate:  [75-87] 77  Resp:  [20-27] 22  BP: ()/(56-81) 97/60  FiO2 (%):  [24 %-35 %] 24 %    Weight:   143 lb 8 oz (65.1 kg)    I/O last 24 hours    Intake/Output Summary (Last 24 hours) at 6/26/2021 0831  Last data filed at 6/26/2021 0628  Gross per 24 hour   Intake 3922 ml   Output 2950 ml   Net 972 ml       Review of Systems   Patient is unable to provide history due to altered mental status.    Vitals-Reviewed in chart  GENERAL -Health appearing, No apparent distress  EYES- No scleral icterus, no eyelid droop, Pupils - see  Neuro section  HEENT - Normocephalic, atraumatic, Hearing possibly limited versus intact unable to determine; Oral mucosa moist and pink in color. External Ears and nose intact.   Neck - supple with no obvious lymphadenopathy or thyromegaly  PULM -trached.  CV- Pedal pulses present with no significant peripheral edema/ No significant varicosities.  MSK- Gait - see Neuro section; Strength and tone- see Neuro section; Range of motion grossly intact.  Psych-not agitated.    Neurological  Mental status - Patient is awake with no speech.  Attention slightly decreased.  Able to follows some commands.   Cranial nerves -pupils are symmetric.  Nasolabial folds are symmetric.  Visual fields are questionably intact to threat.  Horizontal extraocular movements appear to be intact.    Motor -moves all 4 extremities to painful stimulation.  No spontaneous movement though had restraints on.  Tone - Tone is symmetric bilaterally in upper and lower extremities.   Sensation -responds to pain in all 4 extremities.  Coordination - Finger to nose and heel to shin -patient is unable to do.  Gait and station -unable to ambulate due to mental status       DIAGNOSTIC STUDIES     Pertinent Radiology   CT Head  IMPRESSION:   1.  No significant change in the size of the mildly enlarged lateral and third ventricles from CT 06/14/2021. Slight decrease in the intraventricular hemorrhage in the occipital horns in the interval.  2.  Again seen is a broad area of evolving infarction involving the anterior paramedian right frontal lobe similar in size to the CT 06/14/2021. A small amount of petechial blood products have developed in the central aspect of this infarct since   06/14/2021. No space-occupying hemorrhage. Continued follow-up recommended.  3.  Stable small subacute infarct along the paramedian superior left frontal lobe posteriorly.  4.  Again seen are low density changes associated with a previous left frontal approach ventricular  catheter track with decrease in the density of blood products along the catheter track in the interval.  5.  Slight decrease in the scattered subarachnoid hemorrhage in the cerebral sulci in the interval. No definite new subarachnoid hemorrhage.  6.  Stable postop changes right frontotemporal craniotomy with aneurysm clip in the right parasellar region with stable mixed density extra-axial fluid and blood products deep to the craniectomy site.    EEG  IMPRESSION/REPORT/PLAN  This is an abnormal EEG during wakefulness and drowsiness.  A generalized slow background could be suggestive of an encephalopathy.  Focal slowness in the bifrontal region and generalized slowness in the right cerebral hemisphere could suggest cerebral dysfunction in these regions such as from a structural lesion.  No electrographic seizures were seen during the recording.  Further clinical correlation is needed.     Please note that the absence of epileptiform abnormalities does not exclude the possibility of epilepsy in any patient.     Recent Results (from the past 24 hour(s))   POCT Glucose    Collection Time: 06/25/21 11:55 AM    Specimen: Blood   Result Value Ref Range    Glucose 107 70 - 139 mg/dL   POCT Glucose    Collection Time: 06/25/21  5:54 PM    Specimen: Blood   Result Value Ref Range    Glucose 122 70 - 139 mg/dL   POCT Glucose    Collection Time: 06/25/21 11:13 PM    Specimen: Blood   Result Value Ref Range    Glucose 123 70 - 139 mg/dL   POCT Glucose    Collection Time: 06/26/21  5:25 AM    Specimen: Blood   Result Value Ref Range    Glucose 128 70 - 139 mg/dL   Basic Metabolic Panel    Collection Time: 06/26/21  5:47 AM   Result Value Ref Range    Sodium 138 136 - 145 mmol/L    Potassium 4.4 3.5 - 5.0 mmol/L    Chloride 100 98 - 107 mmol/L    CO2 28 22 - 31 mmol/L    Anion Gap, Calculation 10 5 - 18 mmol/L    Glucose 122 70 - 125 mg/dL    Calcium 9.7 8.5 - 10.5 mg/dL    BUN 35 (H) 8 - 22 mg/dL    Creatinine 0.70 0.70 - 1.30  mg/dL    GFR MDRD Af Amer >60 >60 mL/min/1.73m2    GFR MDRD Non Af Amer >60 >60 mL/min/1.73m2         HOSPITAL MEDICATIONS       amino acids-protein hydrolys  1 packet Enteral Tube DAILY     famotidine  20 mg Enteral Tube BID     guar gum  1 packet Enteral Tube TID     heparin (PF) ANTICOAGULANT  5,000 Units Subcutaneous Q8H FIXED TIMES     insulin aspart (NovoLOG) injection   Subcutaneous Q6H FIXED TIMES     ipratropium-albuteroL  3 mL Nebulization Q6H - RT     Lactobacillus rhamnosus GG  1 capsule Enteral Tube Daily with brkfst     lisinopriL  20 mg Enteral Tube Daily     menthol-zinc oxide   Topical TID     sodium chloride  3 mL Nebulization BID     tobramycin  80 mg Nebulization Q8H - RT        Total time spent for face to face visit, reviewing chart/labs/imaging studies, counseling and coordination of care was: Over 30 min More than 50% of this time was spent on counseling and coordination of care.    Reviewing chart.  Coordination of care with the primary team.    Alton Richards MD  Neurologist  [facility] Neu[facility]  Tel:- [phone]    This note was dictated using voice recognition software.  Any grammatical or context distortions are unintentional and inherent to the software.

## 2021-07-07 NOTE — PROGRESS NOTES
Progress Notes by David Coffman MA, CCC-SLP at 7/7/2021  2:43 PM     Author: David Coffman MA, CCC-SLP Service: -- Author Type: Speech and Language Pathologist    Filed: 7/7/2021  3:07 PM Date of Service: 7/7/2021  2:43 PM Status: Addendum    : David Coffman MA, CCC-SLP (Speech and Language Pathologist)    Related Notes: Original Note by David Coffman MA, CCC-SLP (Speech and Language Pathologist) filed at 7/7/2021  3:03 PM       Speech Language/Pathology  Speech Therapy Daily Progress Note  Patient started ritalin today. Wife reports she has noted improvements.  Patient presents as cooperative and awake during this session.  An  was not applicable. Wife present via Ipad.    Objective  Verbal expression: to address impairment in communication of needs, wants, and ideas    Automatic expression task was 60 % accurate independently. Did not increase with max prompts. Did not respond to simple personal/biographical questions.     Frequently responds with either whisper or mouthing. Did not significantly improve with verbal, tactile cues to increase loudness.     Auditory comprehension: to address impairment of understanding spoken language    Patient completed simple yes/no questions with 60 % response rate.   Patient followed 1 step  directions with 50 % accuracy independently. Needed max cues to increase to 60%. Notable for decreased attention, appeared to be internally and externally distracted. Moved from patient's left side to his right side, which improved responsiveness briefly. Moving IPad from patient's view also seemed to improve responsiveness briefly.     Wife with questions re: frequency of intervention and suggestions and plan re: swallowing function. Educated re: role of alertness and responsiveness   To his environment as a precursor and building block to swallowing, particularly food and liquid. Also educated them re: correlation between voicing and  swallowing and potential Impact on targeting voice for swallowing function     Assessment  Demonstrates more consistent responsiveness, but still reduced. Significantly reduced voicing, which reduces intelligibility. Slightly improved attention to task, but still variable. Patient making slow progress. Continued skilled SLP services are warranted to optimize patient status/progress toward goals in POC. Goals in POC not targeted in current session d/t emphasis on above areas remain appropriate.     Plan/Recommendations  Continue per plan. If participation continues at current rate, consider increasing frequency next week.     The [facility] Care Plan has been reviewed and current plan remains appropriate.    26 speech/language minutes     David Coffman MA, CCC-SLP

## 2021-07-07 NOTE — PROGRESS NOTES
Hospitalist Progress Note    Assessment/Plan    A: Patient is a 71 yo man who has hypertension. Patient initially presented on 15-May-2021 to [facility] after being found unresponsive by his wife. Patient was then found to have a subarachnoid hemorrhage secondary to a ruptured aneurysm. Patient was intubated and was started on mannitol, IV antihypertensives and hyperventilation and patient was then transferred to [facility]. Patient underwent aneurysm clipping as well as placement of extra-ventricular drain from hydrocephalus on 15-May-2021. Extra-ventricular drain would malfunction and would require replacement on 04-Jun-2021 and 06-Jun-2021. Extra-ventricular drain reportedly was removed on 09-Jun-2021. Patient had intra-arterial vasospasm treatment with verapamil on 28-May-2021.     Patient was extubated on 16-May-2021 but had to be reintubated on 19-May-2021. Patient would be extubated on 03-Jun-2021 but would be reintubated on 06-Jun-2021. Patient had tracheostomy and PEG-tube placed on 07-Jun-2021. Patient has required treatment for hospital-acquired pneumonia. Patient was transferred to LTAC on 11-Jun-2021.     There is concern for ongoing Pseudomonas tracheobronchitis.    P:  1.) Acute hypoxemic respiratory failure: Patient has tracheostomy in place. Monitoring respiratory status. Pulmonology seeing.  2.) Pseudomonas tracheobronchitis: On nebulized tobramycin.  3.) Recent subarachnoid hemorrhage: Awaiting EEG results.  4.) Metabolic encephalopathy: Monitoring for safety.  5.) Hypertension: Currently controlled on lisinopril 20 mg daily. Monitoring.  6.) Oropharyngeal dysphagia: Patient on tube feeds.   7.) Moderate protein-calorie malnutrition: Supplementing as able.  8.) Discussed with patient's family. Questions answered to the best of my ability.      Principal Problem:    SAH (subarachnoid hemorrhage) (H)  Active Problems:    Acute respiratory failure with hypoxia  (H)    ESBL (extended spectrum beta-lactamase) producing bacteria infection    Tracheostomy care (H)    Acute and chronic respiratory failure with hypoxia (H)    On mechanically assisted ventilation (H)    Encounter for palliative care    Encephalopathy    Metabolic encephalopathy    Idiopathic hypertension    Oropharyngeal dysphagia    Moderate protein-calorie malnutrition (H)    Pseudomonas aeruginosa infection    Acute tracheobronchitis      Subjective    Patient was not answering questions.    Objective    Vital signs in last 24 hours  Temp:  [97.8  F (36.6  C)-98.4  F (36.9  C)] 97.8  F (36.6  C)  Heart Rate:  [75-87] 87  Resp:  [18-28] 22  BP: (100-121)/(62-71) 115/71  FiO2 (%):  [24 %-35 %] 35 % 95% O2 Device: Trach mask O2 Flow Rate (L/min): 30 L/min  Weight:   143 lb 2 oz (64.9 kg) Weight change:     Intake/Output last 3 shifts  I/O last 3 completed shifts:  In: 4089 [NG/GT:4089]  Out: 2750 [Urine:2750]  Body mass index is 19.96 kg/m .    Physical Exam    General:     Patient NAD. Patient would look over to voice at times.   HEENT:     No scleral icterus or conjunctival injection.   Heart:    RRR, S1 S2 w/o murmurs.   Lungs:     Breath sounds present, coarsened.   Abdomen:   Soft.     Pertinent Labs   Lab Results: personally reviewed.   Results from last 7 days   Lab Units 06/25/21  0542 06/24/21  0530 06/23/21  0543   LN-SODIUM mmol/L 138 140 140   LN-POTASSIUM mmol/L 4.4 4.3 4.1   LN-CHLORIDE mmol/L 101 103 102   LN-CO2 mmol/L 28 28 29   LN-BLOOD UREA NITROGEN mg/dL 34* 34* 34*   LN-CREATININE mg/dL 0.68* 0.70 0.66*   LN-CALCIUM mg/dL 9.9 10.0 9.9     Results from last 7 days   Lab Units 06/25/21  0542 06/23/21  0543 06/21/21  0619   LN-WHITE BLOOD CELL COUNT thou/uL 8.4 6.2 8.1   LN-HEMOGLOBIN g/dL 9.5* 9.2* 9.1*   LN-HEMATOCRIT % 30.0* 28.7* 28.8*   LN-PLATELET COUNT thou/uL 378 351 284   LN-NEUTROPHILS RELATIVE PERCENT % 76* 72* 76*   LN-MONOCYTES RELATIVE PERCENT % 7 8 7         Results from last 7  days   Lab Units 06/25/21  1155 06/25/21  0526 06/24/21  2313 06/24/21  1802 06/24/21  1210 06/24/21  0544 06/23/21  2330 06/23/21  1758 06/23/21  1135 06/23/21  0546   LN-POC GLUCOSE FINGERSTICK- HE mg/dL 107 119 140* 129 146* 115 118 108 111 129       Medications  Scheduled Meds:    amino acids-protein hydrolys  1 packet Enteral Tube DAILY     famotidine  20 mg Enteral Tube BID     guar gum  1 packet Enteral Tube TID     heparin (PF) ANTICOAGULANT  5,000 Units Subcutaneous Q8H FIXED TIMES     insulin aspart (NovoLOG) injection   Subcutaneous Q6H FIXED TIMES     ipratropium-albuteroL  3 mL Nebulization Q6H - RT     Lactobacillus rhamnosus GG  1 capsule Enteral Tube Daily with brkfst     lisinopriL  20 mg Enteral Tube Daily     menthol-zinc oxide   Topical TID     sodium chloride  3 mL Nebulization BID     [START ON 6/26/2021] tobramycin  80 mg Nebulization Q8H - RT     Continuous Infusions:  PRN Meds:.acetaminophen **OR** acetaminophen, alteplase, bisacodyL, dextrose 50 % (D50W), docusate sodium, glucagon (human recombinant), hydrALAZINE, HYDROmorphone, magnesium hydroxide, naloxone **OR** naloxone **OR** naloxone **OR** naloxone, polyethylene glycol, sodium chloride 0.9%, sodium chloride

## 2021-07-07 NOTE — PLAN OF CARE
Problem: Daily Care  Goal: Daily care needs are met  Outcome: Progressing   Patient slept well last night and does not seem to be in any discomfort. No agitation or pulling lines noted. Will continue restraints and will closely observed the need. Will continue to monitor.

## 2021-07-07 NOTE — PROGRESS NOTES
Progress Notes by Kim Knox MD at 7/7/2021  9:52 AM     Author: Kim Knox MD Service: Hospitalist Author Type: Physician    Filed: 7/7/2021  9:52 AM Date of Service: 7/7/2021  9:52 AM Status: Signed    : Kim Knox MD (Physician)       PROVIDER RESTRAINT FOR NON-VIOLENT BEHAVIOR FACE TO FACE EVALUATION    Patient's Immediate Situation:  Patient demonstrated the following behaviors: Pulling/tugging at invasive lines or tubes and does not respond to verbal/non-verbal redirection    Patient's Reaction to the intervention:  Does patient understand the reason for restraint/seclusion? No    Medical Condition:  Is there any evidence of compromise of Skin integrity, Respiratory, Cardiovascular, Musculoskeletal, Hydration? No    Behavioral Condition:  In consultation with the RN, is there a need to continue this restraint or seclusion? Yes   Still trying to touch trach site  Continue mittens    See Restraint Flowsheet for complete restraint documentation and assessment.    Kim Knox MD

## 2021-07-07 NOTE — PROGRESS NOTES
Hospitalist Progress Note    Assessment/Plan    A: Patient is a 69 yo man who has hypertension. Patient initially presented on 15-May-2021 to [facility] after being found unresponsive by his wife. Patient was then found to have a subarachnoid hemorrhage secondary to a ruptured aneurysm. Patient was intubated and was started on mannitol, IV antihypertensives and hyperventilation and patient was then transferred to [facility]. Patient underwent aneurysm clipping as well as placement of extra-ventricular drain from hydrocephalus on 15-May-2021. Extra-ventricular drain would malfunction and would require replacement on 04-Jun-2021 and 06-Jun-2021. Extra-ventricular drain reportedly was removed on 09-Jun-2021. Patient had intra-arterial vasospasm treatment with verapamil on 28-May-2021.     Patient was extubated on 16-May-2021 but had to be reintubated on 19-May-2021. Patient would be extubated on 03-Jun-2021 but would be reintubated on 06-Jun-2021. Patient had tracheostomy and PEG-tube placed on 07-Jun-2021. Patient has required treatment for hospital-acquired pneumonia. Patient was transferred to LTAC on 11-Jun-2021.      There is concern for ongoing Pseudomonas tracheobronchitis. There was no epileptiform activity on EEG.     P:  1.) Acute hypoxemic respiratory failure: Patient has tracheostomy in place. Monitoring respiratory status. Pulmonology seeing.  2.) Pseudomonas tracheobronchitis: On nebulized tobramycin.  3.) Recent subarachnoid hemorrhage: Physical, occupational and speech therapy as able.  4.) Metabolic encephalopathy: Monitoring for safety.  5.) Hypertension: Blood pressure has fallen. Will hold lisinopril for now.  6.) Oropharyngeal dysphagia: Patient on tube feeds.   7.) Moderate protein-calorie malnutrition: Supplementing as able.  8.) Discussed with patient's family. Questions answered to the best of my ability.       Principal Problem:    SAH (subarachnoid  hemorrhage) (H)  Active Problems:    Acute respiratory failure with hypoxia (H)    ESBL (extended spectrum beta-lactamase) producing bacteria infection    Tracheostomy care (H)    Acute and chronic respiratory failure with hypoxia (H)    On mechanically assisted ventilation (H)    Encounter for palliative care    Encephalopathy    Metabolic encephalopathy    Idiopathic hypertension    Oropharyngeal dysphagia    Moderate protein-calorie malnutrition (H)    Pseudomonas aeruginosa infection    Acute tracheobronchitis      Subjective    Patient was not answering questions.    Objective    Vital signs in last 24 hours  Temp:  [97.7  F (36.5  C)-99.4  F (37.4  C)] 97.7  F (36.5  C)  Heart Rate:  [73-87] 73  Resp:  [20-27] 20  BP: ()/(56-81) 97/60  FiO2 (%):  [24 %-38 %] 38 % 95% O2 Device: Trach mask O2 Flow Rate (L/min): 30 L/min  Weight:   143 lb 8 oz (65.1 kg) Weight change: 2 lb 12.8 oz (1.27 kg)    Intake/Output last 3 shifts  I/O last 3 completed shifts:  In: 3922 [NG/GT:3922]  Out: 2950 [Urine:2950]  Body mass index is 20.01 kg/m .    Physical Exam    General:     Patient comfortable, NAD. Awake and looking at his family.   HEENT:     No scleral icterus or conjunctival injection.   Heart:    RRR, S1 S2 w/o murmurs.   Lungs:     Breath sounds present. No crackles/wheezes heard.   Abdomen:   Soft.      Pertinent Labs   Lab Results: personally reviewed.   Results from last 7 days   Lab Units 06/26/21  0547 06/25/21  0542 06/24/21  0530   LN-SODIUM mmol/L 138 138 140   LN-POTASSIUM mmol/L 4.4 4.4 4.3   LN-CHLORIDE mmol/L 100 101 103   LN-CO2 mmol/L 28 28 28   LN-BLOOD UREA NITROGEN mg/dL 35* 34* 34*   LN-CREATININE mg/dL 0.70 0.68* 0.70   LN-CALCIUM mg/dL 9.7 9.9 10.0     Results from last 7 days   Lab Units 06/25/21  0542 06/23/21  0543 06/21/21  0619   LN-WHITE BLOOD CELL COUNT thou/uL 8.4 6.2 8.1   LN-HEMOGLOBIN g/dL 9.5* 9.2* 9.1*   LN-HEMATOCRIT % 30.0* 28.7* 28.8*   LN-PLATELET COUNT thou/uL 378 061 284    LN-NEUTROPHILS RELATIVE PERCENT % 76* 72* 76*   LN-MONOCYTES RELATIVE PERCENT % 7 8 7         Results from last 7 days   Lab Units 06/26/21  1204 06/26/21  0525 06/25/21  2313 06/25/21  1754 06/25/21  1155 06/25/21  0526 06/24/21  2313 06/24/21  1802 06/24/21  1210 06/24/21  0544   LN-POC GLUCOSE FINGERSTICK- HE mg/dL 142* 128 123 122 107 119 140* 129 146* 115       Medications  Scheduled Meds:    amino acids-protein hydrolys  1 packet Enteral Tube DAILY     famotidine  20 mg Enteral Tube BID     guar gum  1 packet Enteral Tube TID     heparin (PF) ANTICOAGULANT  5,000 Units Subcutaneous Q8H FIXED TIMES     insulin aspart (NovoLOG) injection   Subcutaneous Q6H FIXED TIMES     ipratropium-albuteroL  3 mL Nebulization Q6H - RT     Lactobacillus rhamnosus GG  1 capsule Enteral Tube Daily with brkfst     [Held by provider] lisinopriL  20 mg Enteral Tube Daily     menthol-zinc oxide   Topical TID     sodium chloride  3 mL Nebulization BID     tobramycin  80 mg Nebulization Q8H - RT     Continuous Infusions:  PRN Meds:.acetaminophen **OR** acetaminophen, alteplase, bisacodyL, dextrose 50 % (D50W), docusate sodium, glucagon (human recombinant), hydrALAZINE, HYDROmorphone, magnesium hydroxide, naloxone **OR** naloxone **OR** naloxone **OR** naloxone, polyethylene glycol, sodium chloride 0.9%, sodium chloride

## 2021-07-07 NOTE — PLAN OF CARE
Problem: Pain  Goal: Patient's pain/discomfort is manageable  Outcome: Progressing     Problem: Safety  Goal: Patient will be injury free during hospitalization  Outcome: Progressing     Problem: Daily Care  Goal: Daily care needs are met  Outcome: Progressing   Vital signs were stable. Pt. does not appear to be in pain and was comfortable for the whole shift. Pt. is still on restraint for safety. TF is running at 90 ml/hr. Continue to monitor.  Margot Sargent RN

## 2021-07-07 NOTE — PLAN OF CARE
Problem: Pain  Goal: Patient's pain/discomfort is manageable  Outcome: Progressing     Problem: Daily Care  Goal: Daily care needs are met  Outcome: Progressing     Problem: Potential for Compromised Skin Integrity  Goal: Skin integrity is maintained or improved  Outcome: Progressing  Patient's vital signs were stable. No signs and symptoms of pain noted. Patient was up in a chair for two hours. Blood glucose was within normal limits. All due cares and medications given.

## 2021-07-07 NOTE — PLAN OF CARE
Plan of Care by Rosa M Odonnell RN at 7/7/2021  6:53 PM     Author: Rosa M Odonnell RN Service: -- Author Type: Registered Nurse    Filed: 7/7/2021  6:54 PM Date of Service: 7/7/2021  6:53 PM Status: Signed    : Rosa M Odonnell RN (Registered Nurse)         Problem: Safety  Goal: Patient will be injury free during hospitalization  Outcome: Progressing   Still on bilateral mitts restraint for safety.   Problem: Daily Care  Goal: Daily care needs are met  Outcome: Progressing   Pt alert, no signs of pain noted. With Trach, vented during the night. Got him up in the chair after 9am. He participated in OT, PT & speech evaluation. Got him up in the chair x1 for 2 hrs. Wife in the ipad all day.

## 2021-07-07 NOTE — PLAN OF CARE
Problem: Safety  Goal: Patient will be injury free during hospitalization  Outcome: Progressing     Problem: Psychosocial Needs  Goal: Demonstrates ability to cope with hospitalization/illness  Outcome: Progressing     Problem: Potential for transmission of organism by Contact, Enteric, Droplet and/or Airborne routes  Goal: Prevent transmission of organisms  Outcome: Progressing     Problem: Risk of Injury Due to Unsafe Behavior  Goal: Patient will remain safe while in restraint; physical/psychological needs will be met  Outcome: Progressing     Problem: Potential for Compromised Skin Integrity  Goal: Skin integrity is maintained or improved  Outcome: Progressing     Alert,calm, no behavioral issues noted.Turn & repositioned q 2 hrs 1 x BM large loose, SL restraints x 2 ,no skin injury noted, checked q 2 hrs. Intact fournier. Pt in TF continuous flushed as desired. No PRN given.

## 2021-07-07 NOTE — PLAN OF CARE
RT PROGRESS NOTE     DATA:     CURRENT SETTINGS:             TRACH TYPE / SIZE:  6 Shiley placed 6-7-21.              MODE:   PRVC 14/450/+5/24%                  ACTION:             THERAPIES:   Q6 Duo, two times a day Saline and Q8 Tobramycin nebs given.              SUCTION:                           FREQUENCY:   X 6                        AMOUNT:   mod x 4, copious x 2                        CONSISTENCY:  normal                        COLOR:   white, slight blue noted x 1             SPONTANEOUS COUGH EFFORT/STRENGTH OF EFFORT (not elicited by suctioning): Strong/Productive                              WEANING PHASE:   2                        WEAN MODE:    35% @ 30 LPM HFTM with cuff up                        WEAN TIME:   0725-2230 Friday, 6/25/21, for 15 hours and 5 minutes. No signs of shortness of breath or WOB at the end. BS: Clear and dim post sxn, SATs 97%, HR 82, RR 20                        END WEAN REASON:   HS     RESPONSE:             BS:   Clear and dim to clear and dim with a few, faint rhonchi             VITAL SIGNS:   SATs %, HR 78-85, RR 20-27             RISK FOR SELF DECANNULATION:  Yes             RISK DUE TO:  Confusion             INTERVENTION/S IN PLACE IS/ARE:  Bilateral soft wrist restraints.       NOTE / PLAN:   Continue with same     Problem: Mechanical Ventilation  Goal: Patient will maintain patent airway  Outcome: Progressing  Goal: Tracheostomy will be managed safely  Outcome: Progressing  Goal: Respiratory status - ventilation  Description: Movement of air in and out of the lungs.    Liberate from ventilator  Outcome: Progressing

## 2021-07-07 NOTE — PLAN OF CARE
Care Management Progression of Care Update        DR GLOS - Target D/C Date 21        PLAN/GOALS  1. Pulmonary following. Phase 2 wean~Trach mask hi rosemarie 10L/30% Fi02.  Tolerated 12 hours (PMV for 5 hrs). Vent at night settings PRVC/AC @ 24%.  Frequent suctioning 6x/shift. Duo-neb six times a day, Barry q8hrs and Saline two times a day for pulmonary hygiene.    2.  Nutrition management.  Tube feeding via PEG placed 21.  Registered dietician to monitor tube feeding tolerance, weight and labs.    3.  Neurology following.  EEG.    4. PT/OT 5x/week.    5.  Speech therapy 4x/week.    6.  Palliative following.           BARRIERS  1.  Acute hypoxic respiratory failure due to subarachnoid hemorrhage.  Vent / trach / wean.    2.  Acute metabolic encephalopathy.    3.  Oropharyngeal dysphagia.    3.   Bilateral soft wrist restraints for safety.        Disposition: TCU  Care Manager Name:  Jacqueline Vieyra RN,BSN, HNB-BC    Date/Time:  21 9:16 AM

## 2021-07-07 NOTE — PLAN OF CARE
Problem: Pain  Goal: Patient's pain/discomfort is manageable  Outcome: Progressing     Problem: Safety  Goal: Patient will be injury free during hospitalization  Outcome: Progressing     Problem: Daily Care  Goal: Daily care needs are met  Outcome: Progressing     Problem: Psychosocial Needs  Goal: Demonstrates ability to cope with hospitalization/illness  Outcome: Progressing     Problem: Risk of Injury Due to Unsafe Behavior  Goal: Patient will remain safe while in restraint; physical/psychological needs will be met  Outcome: Progressing     Patient continue to be on restraint and based on assessment, he is not yet ready for restraint discontinuation trial.  He remained stable and comfortable.  His wife and daughter has been on video visiting most of the shift and the were able to get realtime update from primary RN and other care providers.  Hao Blackwood RN

## 2021-07-07 NOTE — PLAN OF CARE
Problem: Pain  Goal: Patient's pain/discomfort is manageable  Outcome: Progressing     Problem: Safety  Goal: Patient will be injury free during hospitalization  Outcome: Progressing   Pt tolerated repositioning every two hrs. Appeared to be uncomfortable and was grimacing with turns. Prn tylenol given. Bs wnl

## 2021-07-08 LAB — LACTATE SERPL-SCNC: 1.5 MMOL/L (ref 0.7–2)

## 2021-07-08 PROCEDURE — 83605 ASSAY OF LACTIC ACID: CPT

## 2021-07-08 PROCEDURE — 97129 THER IVNTJ 1ST 15 MIN: CPT | Mod: GO

## 2021-07-08 PROCEDURE — 94640 AIRWAY INHALATION TREATMENT: CPT

## 2021-07-08 PROCEDURE — 94003 VENT MGMT INPAT SUBQ DAY: CPT

## 2021-07-08 PROCEDURE — 36415 COLL VENOUS BLD VENIPUNCTURE: CPT

## 2021-07-08 PROCEDURE — 97110 THERAPEUTIC EXERCISES: CPT | Mod: GP

## 2021-07-08 PROCEDURE — 999N001212 HC REV CODE 9999 CHARGE CONVERSION OPNP: Mod: GP

## 2021-07-08 ASSESSMENT — MIFFLIN-ST. JEOR: SCORE: 1442.13

## 2021-07-08 NOTE — PLAN OF CARE
Plan of Care by Manuelito Liu OT at 7/8/2021  3:43 PM     Author: Manuelito Liu OT Service: -- Author Type: Occupational Therapist    Filed: 7/8/2021  3:43 PM Date of Service: 7/8/2021  3:43 PM Status: Signed    : Manuelito Liu OT (Occupational Therapist)         Problem: Occupational Therapy  Goal: OT Goals  Description: Patient will demonstrate the following by 7/30/21, in order to maximize independence with ADL/IADL performance:      -Demonstrate safety with Bed/Chair/Toilet transfer with mod A- ONGOING   -Complete UB dressing with min A,caryn tech as approp- ONGOING   -Participate in grooming/hygiene tasks with CGA in sitting- ONGOING  -Pt to demo bed mobility with max A- ONGOING  -Pt to tolerate sitting EOB w/max A 8 minutes-ONGOING   -Patient will participate in further cognitive testing in order to assist with safe discharge planning- ONGOING  -Patient will participate in further visual assessing  in order to assist with safe discharge planning- ONGOING   -Patient will participate in 15 minutes of UE Exercise/activity to increase activity tolerance for daily tasks/trf- ONGOING   -Pt to attend to therapy task 15 minute w/min A-vc for ease of ADL completion. - ONGOING    Goals reviewed and remain appropriate on 6/17/2021 by Dahiana Connelly OT  Goals reviewed/remain approp on 6/30/21 by SAVAGE Bal/ASHISH  Goals reviewed/remain approp on 7/8/21 by SAVAGE Bal./ASHISH        Outcome: Progressing

## 2021-07-08 NOTE — PLAN OF CARE
Plan of Care by Nahomy Razo LRT at 7/8/2021 12:58 AM     Author: Nahomy Razo LRT Service: -- Author Type: Respiratory Therapist    Filed: 7/8/2021  5:47 AM Date of Service: 7/8/2021 12:58 AM Status: Signed    : Nahomy Razo LRT (Respiratory Therapist)       Problem: Mechanical Ventilation  Goal: Patient will maintain patent airway  Outcome: Progressing  Goal: Tracheostomy will be managed safely  Outcome: Progressing  Goal: Respiratory status - ventilation  Description: Movement of air in and out of the lungs.    Liberate from ventilator  Outcome: Progressing   RT PROGRESS NOTE   [id number]  DATA:     CURRENT SETTINGS:             TRACH TYPE / SIZE:  #7 B[place]             MODE:   PRVC 14 450 +5              FIO2:   30%     ACTION:             THERAPIES:   Duoneb q6, Barry q8,Nacl bid             SUCTION:                           FREQUENCY: x3                        AMOUNT:   scat to small                        CONSISTENCY: yhick                          COLOR:    Pale yellow             SPONTANEOUS COUGH EFFORT/STRENGTH OF EFFORT (not elicited by suctioning): good cough                              WEANING PHASE: 1                          WEAN MODE:  HFTM/PMV 40%30L                        WEAN TIME: for 12 hrs 45min                        END WEAN REASON: for  noc        RESPONSE:             BS: clear, diminished after sx'nd               VITAL SIGNS:                EMOTIONAL NEEDS / CONCERNS:                  RISK FOR SELF DECANNULATION:yes                          RISK DUE TO:  confused                        INTERVENTION/S IN PLACE IS/ARE:  bilateral mittens in place       NOTE / PLAN: Pt tolerated weaning well, no distress. Continue current POC

## 2021-07-08 NOTE — PROGRESS NOTES
Progress Notes by Kim Knox MD at 7/8/2021  7:45 AM     Author: Kim Knox MD Service: Hospitalist Author Type: Physician    Filed: 7/8/2021 10:05 AM Date of Service: 7/8/2021  7:45 AM Status: Signed    : Kim Knox MD (Physician)          [facility] Hospitalist Daily Progress Note   Brief summary:  70 y.o. old male with past medical history of hypertension who presented to ED on 5/15/2021 for altered mental status. He had a fall and was found unresponsive by his wife. He was found to have acute subarachnoid hemorrhage.  On 5/15, he underwent left external ventricular drain placement.  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 One the same day, he also underwent craniotomy with clipping of PCOM aneurysm.  On 5/28/2021, patient had persistently elevated TCD, CT head showed bilateral BAYLEE infarct.  Next day, for persistent TCD and CTA showing bilateral BAYLEE vasospasm, was treated with milrinone and verapamil.  On 6/1/2021, angiogram showed no evidence of vasospasm.  He was extubated on 6/3/2021.On 6/6/2021, patient was reintubated due to drop in his saturation.  On 6/7/2021, patient underwent tracheostomy and PEG tube placement.  On 6/9/2021, EVD was removed.  Patient was initially started on Unasyn for possible pneumonia on 5/20/2021 which was switched to ceftriaxone the next day for sputum culture growing Klebsiella.  On 5/23, sputum culture also grew Pseudomonas for which ceftriaxone was switched to Zosyn.  Due to increased white blood cell counts, possibility of ventriculitis was raised however CSF was negative but antibiotic was switched to cefepime and vancomycin on 5/28/2021 with plan to continue for 2 weeks.  Vanco has been discontinued on 6/9/2021.  On 6/5/2021, sputum grew ESBL so cefepime was switched to meropenem.  He was transferred to [facility] on 6/11/21. Repeat sputum culture 6/19 and he was started on tobramycin nebulization. He has persistent  tracheal secretions but improving.     6/14/21 CT head done for fatigue- read as slight increase in caliber of lateral and third ventricle  With possibility of developing communicating hydrocephalus. Discussed with Dr. Casillas (neurosurgeon at [facility]), who did not think there was much change, however suggested repeating CT head on 6/16/21 which did not show any change.     RT and pulmonary is following and pt is on phase 2 weaning with TM/PMV during day and full vent at night. Vent weaning slowed due to poor cough and secretions.Mental status is slowly improving. He is tolerating tube feeding.     He has severe debility and malnourishment and is progressing slowly and appears malnourishment, weakness is major factor in slow progression and at risk for set backs.    Assessment/Plan    Principal Problem:    SAH (subarachnoid hemorrhage) (H)  Active Problems:    Acute respiratory failure with hypoxia (H)    ESBL (extended spectrum beta-lactamase) producing bacteria infection    Attention to tracheostomy (H)    Acute and chronic respiratory failure with hypoxia (H)    On mechanically assisted ventilation (H)    Encounter for palliative care    Encephalopathy    Metabolic encephalopathy    Idiopathic hypertension    Oropharyngeal dysphagia    Moderate protein-calorie malnutrition (H)    Pseudomonas aeruginosa infection    Acute tracheobronchitis    Cerebrovascular accident (CVA) due to occlusion of cerebral artery (H)    Acute hypoxemic respiratory failure  S/p tracheostomy on 6/7/21, on MV  On phase 2 weaning TM/PMV during day and full vent at night, slow weaning due to poor cough and secretions  Continue PT/OT, bronchial hygiene  Continue vent weaning per pulmonary     Pseudomonas tracheobronchitis/colonization, Has persistent tracheal secretions but improving,   Continues on tobramycin nebulization for 2 weeks  Continue pulmonary toilet, scheduled duonebs     Recent subarachnoid hemorrhage  Presented with fall and  unresponsiveness on 5/15/2021.  S/p EVD, angiogram showing ruptured posterior communicating aneurysm followed by craniotomy with clipping of PCOM aneurysm on the same day.  EVD removed on 6/9/2021.   Postoperative period was complicated by vasospasm, bilateral BAYLEE infarct. MRI done on 5/28 showed right frontal parietal, temopral and left parasagittal parietal lobes infarcts.   On 6/1/2021, angiogram showed no vasospasm.  Will need to follow up with Dr. Martinez in Neurosurgery clinic in 3 months with repeat CTA of head and neck for post-operative follow up     Metabolic encephalopathy: multifactorial  On restraints  CT head 7/1 no new changes  Previous hospitalist discussed with neurology on 7/1/2021   Mental status appears to be flucutating  D/w NSG on call at [facility] 7/7, ok for trial of ritalin which was started on 7/7.      Hypertension-stable  On lisinopril     Oropharyngeal dysphagia  On tube feeding via PEG tube, dietitian, speech following.      Inflamed possibly sebaceous cyst, left inguinal area,   Keflex 500 mg three times a day for 5 days, started on 7/5.Monitor WBC count.  Needs outpatient Surgery evaluation for removal - can follow up for both inguinal hernia and cyst as outpatient    Moderate protein-calorie malnutrition  On tube feeding  RD following     Left inguinal hernia- Monitoring  Needs outpatient evaluation     Severe debility, weakness, critical illness, deconditioning, Continue PT/OT     DVT prophylaxis: Heparin sq  GI prophylaxis: Pepcid     Subjective:   Patient seen, examined.   Chart reviewed and events noted.  Pt sitting up in chair, appears sleepy. Answers briefly. Denies any pain. Less interactive than yest      ROS  Limited, please see above.     Objective:  Vital signs    Temp:  [98.8  F (37.1  C)-99.4  F (37.4  C)] 98.8  F (37.1  C)  Heart Rate:  [76-92] 82  Resp:  [21-28] 23  BP: (107-144)/(63-86) 128/63  FiO2 (%):  [30 %-40 %] 30 %    Weight    Scheduled Meds:  ? cephalexin  500 mg  Enteral Tube TID   ? famotidine  20 mg Enteral Tube BID   ? guar gum  1 packet Enteral Tube TID   ? heparin (PF) ANTICOAGULANT  5,000 Units Subcutaneous Q8H FIXED TIMES   ? ipratropium-albuteroL  3 mL Nebulization Q6H - RT   ? lisinopriL  20 mg Enteral Tube Daily   ? menthol-zinc oxide   Topical TID   ? methylphenidate HCl  5 mg Oral 2 times per day   ? sodium chloride  3 mL Nebulization BID   ? tobramycin  80 mg Nebulization Q8H - RT     Continuous Infusions:  PRN Meds:.acetaminophen **OR** acetaminophen, alteplase, bisacodyL, dextrose 50 % (D50W), docusate sodium, glucagon (human recombinant), hydrALAZINE, loperamide, magnesium hydroxide, naloxone **OR** naloxone **OR** naloxone **OR** naloxone, polyethylene glycol, sodium chloride 0.9%, sodium chloride  Intake/Output    I/O last 3 completed shifts:  In: 3252 [NG/GT:3252]  Out: 2425 [Urine:2425]     Physical Exam:       General Appearance:Chronically ill appearing , NAD  HEENT- NC  Neck- trach+   Lungs: Clear to auscultation bilaterally  Cardiovascular: RRR  Abdomen: Soft, non distended  Extremities:  no edema  Neurologic: Awake, alert.       Imaging: I have independently reviewed.     Lab Results: I have independently reviewed lab results.     All medication issues identified within the last 24 hours have been addressed and completed as appropriate.     Barriers to disposition:  Vent and trach weaning  Expected discharge- multiple days expected     Discussed with SW and his wife Susy over the ipad in patients room.      Kim Knox MD  Hospitalist

## 2021-07-08 NOTE — PLAN OF CARE
Plan of Care by Gail Palmer RN at 7/8/2021  7:08 AM     Author: Gail Palmer RN Service: -- Author Type: Registered Nurse    Filed: 7/8/2021  7:11 AM Date of Service: 7/8/2021  7:08 AM Status: Signed    : Gail Palmer RN (Registered Nurse)         Problem: Pain  Goal: Patient's pain/discomfort is manageable  Outcome: Progressing     Problem: Safety  Goal: Patient will be injury free during hospitalization  Outcome: Progressing     Problem: Daily Care  Goal: Daily care needs are met  Outcome: Progressing    Patient  is alert, no sign of pain noted on this shift,tolerated the vent well and slept most of the shift.

## 2021-07-08 NOTE — PROGRESS NOTES
Progress Notes by Maximus Mathis CNP at 7/8/2021 12:44 PM     Author: Maximus Mathis CNP Service: Palliative Care Author Type: Nurse Practitioner    Filed: 7/8/2021  1:40 PM Date of Service: 7/8/2021 12:44 PM Status: Signed    : Maximus Mathis CNP (Nurse Practitioner)       [facility] - Palliative Care Progress Note    PATIENT NAME: Dayron Neri  DATE OF ADMISSION: 6/11/2021   Today the patient was seen for: Symptom management  Goals of care  Patient/family support       Impressions & Recommendations     Symptom management  # Encephalopathy: in setting of SAH s/p craniotomy and clipping P-comm ruptured aneurysm, and b/l infarct.  EEG neg for seizures.  Repeat CT 7/1 with no acute changes.  Slow progression, alertness wax/wanes  - Methylphenidate: If no effect in 2 days, may double dose; if no improvement after 7 days, discontinue  - consider switching nebs to albuterol only to avoid anticholinergics in setting of encephalopathy   - Avoid benzodiazepines, antihistamines, anticholinergics if able  - Lights on and blinds open during the day.  Reorient frequently  - Lights & TV off during the night.  Promote normal circadian rhythm  - Limit sensory deprivation - utilize hearing aids, glasses, etc  - Frequently assess unmet bathroom needs if applicable.      # Constipation: last BM 7/8  - Bowel regimen    # Fatigue in setting of acute illness - slowly improving  - PT/OT, OOB    # Anxiety: denies  # Nausea: denies  # Dyspnea: Denies.  # Pain: Denies.      Psychosocial/spiritual support: lives with wife.  Has a daughter.  Patient is [employer].      Palliative SW following    Goals of care    Restorative with hopes for vent/trach liberation and eventually get back home    Ongoing GOC discussions warranted pending clinical course     Code status: Full Code - discussed today     Advanced Care Planning    Patient has a completed Health Care Directive: No    Surrogate decision maker if no appointed  agent: wife Susy         Summary          70 yoM with PMH HTN admitted 5/15/21  for AMS and a fall. Found to have acute subarachnoid hemorrhage and underwent left external ventricular drain placement.  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  Course complicated by CT head showing bilateral BAYLEE infarct.  Extubated 5/16, re-intubated 5/19, extubated again 6/3/2021.  Re-intubated again 6/6/2021 d/t hypoxia.  S/p trach/PEG on 6/7/2021, EVD removed 6/9/2021.  Admitted to LTACH 6/11.    Interim: weaned for 12hr 25min yesterday on HFTM/PMV.  Remains on nocturnal vent.  Ritalin started 7/7.              Palliative overview:     6/29: LTAC consult.  Goals are restorative.  Full code  7/1:  No significant changes.  Slow overall progress.  7/6: Discussed on potentially starting Ritalin.  Patient making slow gains.     7/8: I visited with Susy via telephone today.  We discussed Matt's progress and focused this conversation more on long-term outlook.  I addressed code status.  I educated regarding the pros and cons of attempting cardiac resuscitation, intubation, and mechanical ventilation.  I offered to discuss probability of survival as well as quantity of life implications, but Susy did not want to hear percentages.  I worry if Matt were to have a cardiac arrest that his neurologic status would only worsen from his current state which may not be good QOL.  Susy acknowledged this, and states she will discuss more with her daughter.  We then talked about vent weaning and liberation.  I noted Matt has been here for about a month and still is requiring nocturnal ventilation which is not a great sign, but generally someone isn't deemed vent dependent unless 3 months of unsuccessful vent weans have been attempted.  I noted his status remains tenuous and it might not take much to tip him over the edge but I have seen small/slow gains since meeting him.  I asked if vent  dependence would be quality of life for him, and she seemed to think not but did not give a definitive answer.  We will remain optimistically guarded.  She does appreciate providers being honest with her on potential outcomes and welcomes future visits from our service.     Subjective: denies pain, dyspnea, anxiety, n/v.  Very soft voice.      Does the patient have decision making capacity? No    Prognosis, Goals, or Advance Care Planning was addressed today with: Yes.  Mood, coping, and/or meaning in the context of serious illness were addressed today: Yes.           Review of Systems:     A full 14 point review of systems was otherwise completed and is negative aside from that mentioned above          Medications:     I have reviewed this patient's medication profile and medications during this hospitalization.    ? cephalexin  500 mg Enteral Tube TID   ? famotidine  20 mg Enteral Tube BID   ? guar gum  1 packet Enteral Tube TID   ? heparin (PF) ANTICOAGULANT  5,000 Units Subcutaneous Q8H FIXED TIMES   ? ipratropium-albuteroL  3 mL Nebulization Q6H - RT   ? lisinopriL  20 mg Enteral Tube Daily   ? menthol-zinc oxide   Topical TID   ? methylphenidate HCl  5 mg Oral 2 times per day   ? sodium chloride  3 mL Nebulization BID   ? tobramycin  80 mg Nebulization Q8H - RT        PRN MEDS:   acetaminophen **OR** acetaminophen, alteplase, bisacodyL, dextrose 50 % (D50W), docusate sodium, glucagon (human recombinant), hydrALAZINE, loperamide, magnesium hydroxide, naloxone **OR** naloxone **OR** naloxone **OR** naloxone, polyethylene glycol, sodium chloride 0.9%, sodium chloride          Objective:   Vital Last Value 24 Hour Range (min/max)    Temp  Min: 98.8  F (37.1  C)  Max: 99.4  F (37.4  C)    Pulse  Min: 76  Max: 92    Resp  Min: 18  Max: 26    BP  Min: 107/65  Max: 144/86    SpO2  Min: 93 %  Max: 99 %      PHYSICAL EXAMINATION:   General Appearance: lying in bed in NAD, fatigued, bilateral hand mitts on    HEENT:  Normocephalic, atraumatic.  Lips, mucosa, and tongue moist  Resp: CTA anterolaterally; non-labored, on trach dome/PMV, trach intact    CV:  RRR; mild ROSE  Abdomen: Soft, nontender, bowel sounds active  Extremities: Normal, atraumatic, able to move all   Skin: Warm and dry, no rashes.   Neurologic: alert, tracks, follows commands   Psych:  Calm            Data Reviewed:     Reviewed recent pertinent imaging, comments:   No results found.  For a range for imaging in the last 24 hours    Reviewed recent labs, comments:   Lab Results:  personally reviewed  Lab Results   Component Value Date     07/07/2021    K 4.3 07/07/2021    CL 98 07/07/2021    CO2 33 (H) 07/07/2021    BUN 27 (H) 07/07/2021    CREATININE 0.59 (L) 07/07/2021    CALCIUM 10.7 (H) 07/07/2021     Lab Results   Component Value Date    WBC 11.3 (H) 07/07/2021    HGB 10.0 (L) 07/07/2021    HCT 30.8 (L) 07/07/2021    MCV 97 07/07/2021     07/07/2021     Lab Results   Component Value Date/Time    ALBUMIN 2.1 (L) 06/14/2021 02:50 PM     Wt Readings from Last 3 Encounters:   07/08/21 143 lb 4.8 oz (65 kg)         Information gathered today from: patient, family/loved ones, medical team members, unit team members and chart review in Epic     TTS: I have personally spent a total of 40 minutes  today on the unit in review of medical record, consultation with the medical providers and assessment of patient today, with more than 50% of this time spent in counseling, coordination of care, and conversation patient, wife re: prognosis, symptom management, emotional support and development of plan of care, code status     Maximus Mathis, TRISTAN  Palliative Medicine  [facility]  Pager [phone]      During regular M-F work hours -- if you are not sure who specifically to contact -- please contact us by calling [phone].

## 2021-07-08 NOTE — PROGRESS NOTES
Progress Notes by Kim Knox MD at 7/8/2021  7:53 AM     Author: Kim Knox MD Service: Hospitalist Author Type: Physician    Filed: 7/8/2021  7:53 AM Date of Service: 7/8/2021  7:53 AM Status: Signed    : Kim Knox MD (Physician)       PROVIDER RESTRAINT FOR NON-VIOLENT BEHAVIOR FACE TO FACE EVALUATION    Patient's Immediate Situation:  Patient demonstrated the following behaviors: Pulling/tugging at invasive lines or tubes and does not respond to verbal/non-verbal redirection    Patient's Reaction to the intervention:  Does patient understand the reason for restraint/seclusion? No    Medical Condition:  Is there any evidence of compromise of Skin integrity, Respiratory, Cardiovascular, Musculoskeletal, Hydration? No    Behavioral Condition:  In consultation with the RN, is there a need to continue this restraint or seclusion? Yes   Still trying to touch trach site  Continue mittens    See Restraint Flowsheet for complete restraint documentation and assessment.    Kim Knox MD

## 2021-07-08 NOTE — PLAN OF CARE
Plan of Care by Vinny Mendoza LRT at 7/7/2021  7:00 PM     Author: Vinny Mendoza LRT Service: -- Author Type: Respiratory Therapist    Filed: 7/7/2021  8:37 PM Date of Service: 7/7/2021  7:00 PM Status: Signed    : Vinny Mendoza LRT (Respiratory Therapist)       Problem: Mechanical Ventilation  Goal: Patient will maintain patent airway  Outcome: Progressing  Goal: Tracheostomy will be managed safely  Outcome: Progressing  Goal: Respiratory status - ventilation  Outcome: Progressing    RT PROGRESS NOTE     DATA:  CURRENT SETTINGS:   PRVC 14, 450, +5, 30% (Vent on s/b during daytime.)               TRACH TYPE / SIZE:  #7 Bivona TTS     ACTION:             THERAPIES:   Duonebs Q6; Tobramycin nebs Q8; Hypertonic saline nebs BID.               SUCTION:   6x in 12hrs by RN/RT for mod-scant amts of py/white secretions. Good, spont cough.               WEANING PHASE:   2 (Vent at night.)      TM/PMV wean started at 0835, is ongoing, & reji well. Will return pt to full vent support by HS.    RESPONSE:             BS:   Coarse / clear & decreased.             VITAL SIGNS:   HR 87-76, RR 25-28, Sats 99-93-96%               EMOTIONAL NEEDS / CONCERNS:   Minimal.                RISK FOR SELF DECANNULATION:   No.                        RISK DUE TO:   Confusion.                        INTERVENTION/S IN PLACE IS/ARE:   Bilat mitten restraints.       NOTE / PLAN:    Continue resp support as needed, monitor closely, & wean as tolerated.

## 2021-07-08 NOTE — PLAN OF CARE
Plan of Care by Vinny Mendoza LRT at 7/8/2021  4:35 PM     Author: Vinny Mendoza LRT Service: -- Author Type: Respiratory Therapist    Filed: 7/8/2021  4:36 PM Date of Service: 7/8/2021  4:35 PM Status: Signed    : Vinny Mendoza LRT (Respiratory Therapist)       Problem: Mechanical Ventilation  Goal: Patient will maintain patent airway  Outcome: Progressing  Goal: Tracheostomy will be managed safely  Outcome: Progressing  Goal: Respiratory status - ventilation  Outcome: Progressing     RT PROGRESS NOTE     DATA:  CURRENT SETTINGS:   PRVC 14, 450, +5, 30% (Vent on s/b during daytime wean.)               TRACH TYPE / SIZE:  #7 Bivona TTS    ACTION:             THERAPIES:   Duonebs Q6; Tobramycin nebs Q8; Hypertonic saline nebs BID.               SUCTION:   5-6x in 12hrs for mod-lg amts of clear/white secretions. Good, spont cough.               WEANING PHASE:   2    TM/PMV wean started at 0815, is ongoing, & reji well. Will return pt to full vent support by HS.    RESPONSE:             BS:   Coarse / clear & decreased.             VITAL SIGNS:   HR 84-73, RR 18-24, Sats 98-96%.               EMOTIONAL NEEDS / CONCERNS:   Minimal.                RISK FOR SELF DECANNULATION:   Yes.                        RISK DUE TO:   Confusion.                        INTERVENTION/S IN PLACE IS/ARE:   Bilat mitten restraints.       NOTE / PLAN:   Continue resp support as needed, monitor closely, & wean as tolerated.

## 2021-07-09 LAB
ANION GAP SERPL CALCULATED.3IONS-SCNC: 13 MMOL/L (ref 5–18)
BASOPHILS # BLD AUTO: 0.1 THOU/UL (ref 0–0.2)
BASOPHILS NFR BLD AUTO: 0 % (ref 0–2)
BUN SERPL-MCNC: 36 MG/DL (ref 8–22)
CALCIUM SERPL-MCNC: 10.7 MG/DL (ref 8.5–10.5)
CHLORIDE BLD-SCNC: 100 MMOL/L (ref 98–107)
CO2 SERPL-SCNC: 29 MMOL/L (ref 22–31)
CREAT SERPL-MCNC: 0.78 MG/DL (ref 0.7–1.3)
EOSINOPHIL # BLD AUTO: 0 THOU/UL (ref 0–0.4)
EOSINOPHIL NFR BLD AUTO: 0 % (ref 0–6)
ERYTHROCYTE [DISTWIDTH] IN BLOOD BY AUTOMATED COUNT: 14.7 % (ref 11–14.5)
GFR SERPL CREATININE-BSD FRML MDRD: >60 ML/MIN/1.73M2
GLUCOSE BLD-MCNC: 116 MG/DL (ref 70–125)
HCT VFR BLD AUTO: 31.3 % (ref 40–54)
HGB BLD-MCNC: 10.3 G/DL (ref 14–18)
IMM GRANULOCYTES # BLD: 0.8 THOU/UL
IMM GRANULOCYTES NFR BLD: 2 %
LYMPHOCYTES # BLD AUTO: 1.1 THOU/UL (ref 0.8–4.4)
LYMPHOCYTES NFR BLD AUTO: 3 % (ref 20–40)
MCH RBC QN AUTO: 31.6 PG (ref 27–34)
MCHC RBC AUTO-ENTMCNC: 32.9 G/DL (ref 32–36)
MCV RBC AUTO: 96 FL (ref 80–100)
MONOCYTES # BLD AUTO: 1.5 THOU/UL (ref 0–0.9)
MONOCYTES NFR BLD AUTO: 4 % (ref 2–10)
NEUTROPHILS # BLD AUTO: 33.5 THOU/UL (ref 2–7.7)
NEUTROPHILS NFR BLD AUTO: 91 % (ref 50–70)
PLATELET # BLD AUTO: 266 THOU/UL (ref 140–440)
PMV BLD AUTO: 10.5 FL (ref 8.5–12.5)
POTASSIUM BLD-SCNC: 3.9 MMOL/L (ref 3.5–5)
PROCALCITONIN SERPL-MCNC: 1.77 NG/ML (ref 0–0.49)
RBC # BLD AUTO: 3.26 MILL/UL (ref 4.4–6.2)
SODIUM SERPL-SCNC: 142 MMOL/L (ref 136–145)
WBC: 36.9 THOU/UL (ref 4–11)

## 2021-07-09 PROCEDURE — 87070 CULTURE OTHR SPECIMN AEROBIC: CPT

## 2021-07-09 PROCEDURE — 87205 SMEAR GRAM STAIN: CPT

## 2021-07-09 PROCEDURE — 97535 SELF CARE MNGMENT TRAINING: CPT | Mod: GO

## 2021-07-09 PROCEDURE — 94003 VENT MGMT INPAT SUBQ DAY: CPT

## 2021-07-09 PROCEDURE — 84145 PROCALCITONIN (PCT): CPT

## 2021-07-09 PROCEDURE — 87077 CULTURE AEROBIC IDENTIFY: CPT

## 2021-07-09 PROCEDURE — 94640 AIRWAY INHALATION TREATMENT: CPT

## 2021-07-09 PROCEDURE — 36415 COLL VENOUS BLD VENIPUNCTURE: CPT

## 2021-07-09 PROCEDURE — 92507 TX SP LANG VOICE COMM INDIV: CPT | Mod: GN

## 2021-07-09 PROCEDURE — 87040 BLOOD CULTURE FOR BACTERIA: CPT

## 2021-07-09 PROCEDURE — 97110 THERAPEUTIC EXERCISES: CPT | Mod: GP

## 2021-07-09 PROCEDURE — 85025 COMPLETE CBC W/AUTO DIFF WBC: CPT

## 2021-07-09 PROCEDURE — 74018 RADEX ABDOMEN 1 VIEW: CPT

## 2021-07-09 PROCEDURE — 999N001212 HC REV CODE 9999 CHARGE CONVERSION OPNP: Mod: GN

## 2021-07-09 PROCEDURE — 80048 BASIC METABOLIC PNL TOTAL CA: CPT

## 2021-07-09 PROCEDURE — 71045 X-RAY EXAM CHEST 1 VIEW: CPT

## 2021-07-09 PROCEDURE — 87186 SC STD MICRODIL/AGAR DIL: CPT

## 2021-07-09 ASSESSMENT — MIFFLIN-ST. JEOR: SCORE: 1437.09

## 2021-07-09 NOTE — PLAN OF CARE
Plan of Care by Moreno Pillai RN at 7/8/2021  7:31 PM     Author: Moreno Pillai RN Service: -- Author Type: Registered Nurse    Filed: 7/8/2021  7:34 PM Date of Service: 7/8/2021  7:31 PM Status: Signed    : Moreno Pillai RN (Registered Nurse)       Patient alert up on the chair and tolerated well. Denied any pain. Attnded scheduled therapy. Family visited via the ipad.

## 2021-07-09 NOTE — PLAN OF CARE
Plan of Care by Kristal Jaramillo RN at 7/9/2021  7:18 AM     Author: Kristal Jaramillo RN Service: -- Author Type: Registered Nurse    Filed: 7/9/2021  7:18 AM Date of Service: 7/9/2021  7:18 AM Status: Signed    : Kristal Jaramillo RN (Registered Nurse)         Problem: Pain  Goal: Patient's pain/discomfort is manageable  Outcome: Progressing     Problem: Safety  Goal: Patient will be injury free during hospitalization  Outcome: Progressing     Problem: Daily Care  Goal: Daily care needs are met  Outcome: Progressing       Slept 3-4  hours the whole night, no complaints of pain, no vomiting noted,monitored for safety.

## 2021-07-09 NOTE — SIGNIFICANT EVENT
Significant Event by Ruddy Talamantes MD at 7/8/2021 10:52 PM     Author: Ruddy Talamantes MD Service: Hospitalist Author Type: Physician    Filed: 7/8/2021 10:53 PM Date of Service: 7/8/2021 10:52 PM Status: Signed    : Ruddy Talamantes MD (Physician)       Patient had an episode of coughing up tube feeding formula. I have discontinued tube feeding at this time. Will obtain chest xray to evaluate for aspiration, although aspiration findings may take more time to develop. Will monitor patient overnight.

## 2021-07-09 NOTE — PROGRESS NOTES
Progress Notes by Manuelito Liu OT at 7/9/2021  1:48 PM     Author: Manuelito Liu OT Service: -- Author Type: Occupational Therapist    Filed: 7/9/2021  1:48 PM Date of Service: 7/9/2021  1:48 PM Status: Signed    : Manuelito Liu OT (Occupational Therapist)          06/12/21 1500   Hearing, Vision, and Speech   Expression of Ideas and Wants Rarely/Never expresses self   Understanding Verbal Content Sometimes understands   Self Care   Eating  Dependent   Oral Hygiene Dependent   Toileting Hygiene Dependent   Wash Upper Body Dependent

## 2021-07-09 NOTE — PLAN OF CARE
Plan of Care by Lilly Woodard RN at 7/8/2021 11:41 PM     Author: Lilly Woodard RN Service: -- Author Type: Registered Nurse    Filed: 7/8/2021 11:50 PM Date of Service: 7/8/2021 11:41 PM Status: Signed    : Lilly Woodard RN (Registered Nurse)         Problem: Pain  Goal: Patient's pain/discomfort is manageable  Outcome: Progressing     Problem: Safety  Goal: Patient will be injury free during hospitalization  Outcome: Progressing     Problem: Daily Care  Goal: Daily care needs are met  7/8/2021 2341 by Lilly Woodard RN  Outcome: Progressing  7/8/2021 2339 by Lilly Woodard RN  Outcome: Progressing     Problem: Nausea  Goal: Nausea will be resolved or  Outcome: Progressing   Pt  vomited small to moderate amount of Tube feeding like vomitus. RT was informed and no signs of aspiration noted at around 2100. Gastric residual via gtube was 70 ml.   MD updated and ordered PRN Zofran. At about 2200  Pt  was noted to be coughing out tube feeding formula  from his trach. Temp was 102.7 and recheck was 102. Tube feeding held and updated MD  updated ordered to HOLD Tube feeding until tomorrow and STAT chest X ray .  PRN Tylenol given. Cold wash cloth placed in his forehead. Will continue to monitor.

## 2021-07-09 NOTE — PROGRESS NOTES
Progress Notes by Dayron Chen LICSW at 7/9/2021  7:32 AM     Author: Dayron Chen LICSW Service: -- Author Type: Licensed Social Worker    Filed: 7/9/2021  7:34 AM Date of Service: 7/9/2021  7:32 AM Status: Signed    : Dayron Chen LICSW (Licensed Social Worker)       Social Work Note:  Patient chart reviewed.  Patient discussed in morning rounds.  Barriers to discharge: SLRx1.  Trach. Vent. HFTM 40%/3oL.  Fever.  Tube feed on hold.    Patient and family being followed and supported by Palliative Care team.   Patient's exact discharge date, time, disposition TBD.  SW will continue to follow for psychosocial and emotional support of patient and family. SW to facilitate discharge to TCU when pt no longer requires LTACH level of care.      FADIA Doyle  [facility] [place]  [phone]

## 2021-07-09 NOTE — PROGRESS NOTES
Progress Notes by Maximus Mathis CNP at 7/9/2021 12:34 PM     Author: Maximus Mathis CNP Service: Palliative Care Author Type: Nurse Practitioner    Filed: 7/9/2021 12:56 PM Date of Service: 7/9/2021 12:34 PM Status: Addendum    : Maximus Mathis CNP (Nurse Practitioner)    Related Notes: Original Note by Maximus Mathis CNP (Nurse Practitioner) filed at 7/9/2021 12:55 PM       [facility] - Palliative Care Progress Note    PATIENT NAME: Dayron Neri  DATE OF ADMISSION: 6/11/2021   Today the patient was seen for: Symptom management  Goals of care  Patient/family support       Impressions & Recommendations     Symptom management  # Encephalopathy: in setting of SAH s/p craniotomy and clipping P-comm ruptured aneurysm, and b/l infarct.  EEG neg for seizures.  Repeat CT 7/1 with no acute changes.  Slow progression, alertness wax/wanes.  Started on Ritalin 7/7.  - Methylphenidate: Double dose today; if no improvement after 7 days, discontinue.    - consider switching nebs to albuterol only to avoid anticholinergics in setting of encephalopathy   - Avoid benzodiazepines, antihistamines, anticholinergics if able  - Lights on and blinds open during the day.  Reorient frequently  - Lights & TV off during the night.  Promote normal circadian rhythm  - Limit sensory deprivation - utilize hearing aids, glasses, etc  - Frequently assess unmet bathroom needs if applicable.      # Constipation: last BM 7/8  - Bowel regimen    # Fatigue in setting of acute illness and prolonged hospitalization   - Methylphenidate as above  - PT/OT, OOB      Psychosocial/spiritual support: lives with wife.  Has a daughter.  Patient is Taoism.      Appreciate Palliative SW assistance     Goals of care    Restorative with hopes for vent/trach liberation and eventually get back home    Ongoing GOC discussions warranted pending clinical course     Code status: Full Code    Advanced Care Planning    Patient has a  completed Health Care Directive: No    Surrogate decision maker if no appointed agent: wife Susy    Discussed with Dr. Knox        Summary          70 yoM with PMH HTN admitted 5/15/21  for AMS and a fall. Found to have acute subarachnoid hemorrhage and underwent left external ventricular drain placement.  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  Course complicated by CT head showing bilateral BAYLEE infarct.  Extubated 5/16, re-intubated 5/19, extubated again 6/3/2021.  Re-intubated again 6/6/2021 d/t hypoxia.  S/p trach/PEG on 6/7/2021, EVD removed 6/9/2021.  Admitted to LTACH 6/11.    Interim: episode of aspirating tube feeds last evening and this morning, spiked temp, started on empiric antibiotics.                Palliative overview:     6/29: LTAC consult.  Goals are restorative.  Full code  7/1:  No significant changes.  Slow overall progress.  7/6: Discussed on potentially starting Ritalin.  Patient making slow gains.   7/8: Discussed code status - remain full code.  Discussed potential long-term outcomes     7/8: I visited patient with Palliative MARY KATE Burton at bedside.  Patient resting, and we did not wake him.  Tube feeds suctioned from trach again this morning.  Susy and daughter were on iPad.  We discussed Matt's current clinical status, his recent aspiration, infectious work-up.  We also discussed vent weaning/liberation, tracheostomy care, speaking valve mechanics, decannulation at length.  His status remains tenuous especially with this recent aspiration/potential setback.  Susy seems quite defeated today and is looking for definitive answers on how he will get better.  We provided active listening and empathy, but noted there is no silver bullet therapy in this situation.  I briefly discussed hospice and comfort/symptom mgmt focused care stating this is always an option but I don't believe we're there yet.  We discussed therapies he's getting  and I noted it may be helpful to have PT discuss progress with Susy, because she feels he's not getting enough and it's inconsistent.      Subjective: unable to provide any meaningful ROS d/t somnolence      Does the patient have decision making capacity? No    Prognosis, Goals, or Advance Care Planning was addressed today with: Yes.  Mood, coping, and/or meaning in the context of serious illness were addressed today: Yes.           Review of Systems:   KWAME d/t somnolence          Medications:     I have reviewed this patient's medication profile and medications during this hospitalization.    ? famotidine  20 mg Enteral Tube BID   ? guar gum  1 packet Enteral Tube TID   ? heparin (PF) ANTICOAGULANT  5,000 Units Subcutaneous Q8H FIXED TIMES   ? ipratropium-albuteroL  3 mL Nebulization Q6H - RT   ? lisinopriL  20 mg Enteral Tube Daily   ? menthol-zinc oxide   Topical TID   ? methylphenidate HCl  10 mg Oral 2 times per day   ? piperacillin-tazobactam  3.375 g Intravenous Q8H   ? sodium chloride  3 mL Nebulization BID   ? sodium chloride  3 mL Intravenous Line Care   ? tobramycin  80 mg Nebulization Q8H - RT        PRN MEDS:   acetaminophen **OR** acetaminophen, alteplase, bisacodyL, dextrose 50 % (D50W), docusate sodium, glucagon (human recombinant), hydrALAZINE, loperamide, magnesium hydroxide, naloxone **OR** naloxone **OR** naloxone **OR** naloxone, ondansetron, polyethylene glycol, sodium chloride 0.9%, sodium chloride bacteriostatic, sodium chloride          Objective:   Vital Last Value 24 Hour Range (min/max)    Temp  Min: 97.4  F (36.3  C)  Max: 102.8  F (39.3  C)    Pulse  Min: 73  Max: 125    Resp  Min: 16  Max: 38    BP  Min: 97/56  Max: 147/78    SpO2  Min: 92 %  Max: 98 %      PHYSICAL EXAMINATION:   General Appearance: lying in bed in NAD, fatigued, bilateral hand mitts on    HEENT: Normocephalic, atraumatic.   Resp: CTA anterolaterally; non-labored, on trach dome/cuff up, trach intact    CV:  RRR; mild  ROSE  Abdomen: Soft, nontender, bowel sounds active  Extremities: Normal, atraumatic  Skin: Warm and dry, no rashes.   Neurologic: somnolent  Psych:  Calm            Data Reviewed:     Reviewed recent pertinent imaging, comments:   Xr Chest 1 View Portable    Result Date: 7/9/2021  EXAM: XR CHEST 1 VIEW PORTABLE LOCATION: [facility] DATE/TIME: 7/9/2021 12:52 AM INDICATION: Coughing up tube feeding COMPARISON: 06/28/2021     Tracheostomy tube projects over tracheal air column. Increased discoid atelectasis at the left lung base. No other new focal consolidation. No pneumothorax or pleural effusion. Cardiac silhouette within normal limits. Stable tortuous aorta.    For a range for imaging in the last 24 hours    Reviewed recent labs, comments:   Lab Results:  personally reviewed  Lab Results   Component Value Date     07/07/2021    K 4.3 07/07/2021    CL 98 07/07/2021    CO2 33 (H) 07/07/2021    BUN 27 (H) 07/07/2021    CREATININE 0.59 (L) 07/07/2021    CALCIUM 10.7 (H) 07/07/2021     Lab Results   Component Value Date    WBC 36.9 (HH) 07/09/2021    HGB 10.3 (L) 07/09/2021    HCT 31.3 (L) 07/09/2021    MCV 96 07/09/2021     07/09/2021     Lab Results   Component Value Date/Time    ALBUMIN 2.1 (L) 06/14/2021 02:50 PM     Wt Readings from Last 3 Encounters:   07/09/21 142 lb 3 oz (64.5 kg)         Information gathered today from: patient, family/loved ones, medical team members, unit team members and chart review in Epic     TTS: I have personally spent a total of 60 minutes  today on the unit in review of medical record, consultation with the medical providers and assessment of patient today, with more than 50% of this time spent in counseling, coordination of care, and conversation patient, wife and daughter re: prognosis, symptom management, emotional support and development of plan of care    Maximus Mathis, TRISTAN  Palliative Medicine  [facility]  Pager  [phone]      During regular M-F work hours -- if you are not sure who specifically to contact -- please contact us by calling [phone].

## 2021-07-09 NOTE — PROGRESS NOTES
"Progress Notes by Vandana Lomas RD at 7/9/2021  3:39 PM     Author: Vandana Lomas RD Service: -- Author Type: Registered Dietitian    Filed: 7/9/2021  4:02 PM Date of Service: 7/9/2021  3:39 PM Status: Addendum    : Vandana Lomas RD (Registered Dietitian)    Related Notes: Original Note by Vandana Lomas RD (Registered Dietitian) filed at 7/9/2021  3:50 PM           Clinical Nutrition Therapy Brief Note    Consult received to resume enteral feeding which was on hold d/t concerns for aspiration. Pt had Abdominal XR and  per MD confirmed proper placement      Intervention:  Ordered enteral advancement as follows:  Osmolite 1.5 continuous initial rate 30 cc/h x 8 h. If tolerated, advance by 30 cc/h q 8 h to goal rate 90 cc/h. Water flushes:   100 cc q 8 h. ( 1645 cc free fluid from formula+ 300 cc from [id number]=3149 cc/d (30 cc/kg actual)    Current Nutrition Prescription:   Diet: NPO  Diet Supplements: benefiber 1 pkt TID  IV dextrose or Fluids:   Enteral: on hold currently.Pt has a PEG tube which was placed 6/7/21.   ( ON HOLD Diet effective now, Starting Fri 7/9/21 at 0650, Until Specified  Tube Feeding Formula: Osmolite 1.5  Tube Feeding Continuous Initial Rate (mL/hr): 30  Tube Feeding Continuous Rate instructions (to reach goal rate): increase rate by 10ml every two hours until at goal.  Tube Feeding Continuous Goal Rate (ml/hr): 90  Tube Feeding flush (mL): 400  Flush type: Water  Flush frequency: Every 4 hours)    Current Nutrition Intake:  Current TF regimen with modular provides 2160 mL formula, 3288 kcals, 136 g protein, 453 g CHO, 106 g fat, 9 g fiber, 1645 mL fluid (total 3806 mL between formula and flushes).      GI Status/Output:   GI symptoms include: WDL per nurse  Bowel Sounds present per nurse  Last BM: 7/9/21 per nurse  U.O ~ 1500 cc-2200cc p 7 d    Medications:  Medications reviewed.    Anthropometrics:  Ht: 5'11\"  BMI current: 19.83    Most recent " weight: :    07/09/21 0600  142 lb 3 oz (64.5 kg)  --   07/08/21 0650  143 lb 4.8 oz (65 kg)  --   07/07/21 0500  144 lb 8 oz (65.5 kg)  Bed   07/06/21 0653  145 lb 5 oz (65.9 kg)  Bed   07/05/21 0602  142 lb 5 oz (64.6 kg)  --   07/04/21 0555  143 lb 11.2 oz (65.2 kg)  --   07/03/21 0556  139 lb 7 oz (63.2 kg)  Bed   07/02/21 0700  142 lb 9.6 oz (64.7 kg)  Bed   07/01/21 0637  140 lb 6.4 oz (63.7 kg)  Bed   06/30/21 0625  138 lb 3.2 oz (62.7 kg)  Bed   06/29/21 0720  137 lb 9.6 oz (62.4 kg)  Bed   06/28/21 1500  138 lb 3.2 oz (62.7 kg)  --   06/28/21 0532  139 lb 1.6 oz (63.1 kg)  Bed   06/26/21 0606  143 lb 8 oz (65.1 kg)  Bed   06/25/21 0547  143 lb 2 oz (64.9 kg)  Bed         156 lb 6/7/21 and 151 lb 6/11/21.     Labs:  Labs reviewed :  Results from last 7 days   Lab Units 07/09/21  0905   LN-WHITE BLOOD CELL COUNT thou/uL 36.9*   LN-HEMOGLOBIN g/dL 10.3*   LN-HEMATOCRIT % 31.3*   LN-PLATELET COUNT thou/uL 266     Results from last 7 days   Lab Units 07/09/21  1420   LN-SODIUM mmol/L 142   LN-POTASSIUM mmol/L 3.9   LN-CHLORIDE mmol/L 100   LN-CO2 mmol/L 29   LN-BLOOD UREA NITROGEN mg/dL 36*   LN-CREATININE mg/dL 0.78   LN-CALCIUM mg/dL 10.7*         Results from last 7 days   Lab Units 07/09/21  1420 07/07/21  1527 07/05/21  1518   LN-GLUCOSE mg/dL 116 106 129*     Estimated Nutrition Needs:  Assessment weight is 63 kg, most accurate weight (as of 6/23) (81% IBW of 172 lb)     Energy Needs: 2515-6651 kcals daily, 45-50 kcal/kg (updated 6/28 since pt only maintaining wt at 40-45 kcal/kg)  Protein Needs: + mg daily, 1.5-2+ g/kg   Fluid Needs: 1483-3135 mls daily, 25-30 mls/kg           See Care Plan for Problems, Goals, and Interventions.

## 2021-07-09 NOTE — PROGRESS NOTES
Progress Notes by Manuelito Liu OT at 7/9/2021  1:48 PM     Author: Manuelito Liu OT Service: -- Author Type: Occupational Therapist    Filed: 7/9/2021  1:48 PM Date of Service: 7/9/2021  1:48 PM Status: Signed    : Manuelito Liu OT (Occupational Therapist)          06/14/21 0800   Mobility   Roll Left and Right Dependent   Sit to Lying Dependent   Lying to Sitting on the Side of Bed Dependent   Sit to Stand Dependent   Chair/Bed to Chair Transfer Dependent   Toilet Transfer Dependent   Walk 10 Feet  Dependent   Walk 50 Feet With Two Turns Dependent   Walk 150 Feet  Dependent   Does Patient Use Wheelchair/Scooter No   Wheel 50 Feet Not attempted due to medical condition or safety concerns   Wheel 150 Feet Not applicable

## 2021-07-09 NOTE — PLAN OF CARE
Plan of Care by Valeria Pereira RN at 7/9/2021  3:05 PM     Author: Valeria Pereira RN Service: -- Author Type: Registered Nurse    Filed: 7/9/2021  3:09 PM Date of Service: 7/9/2021  3:05 PM Status: Signed    : Valeria Pereira RN (Registered Nurse)       Patient denied pain this shift.. Low grade temp, Cxray and blood cultures done, result  still pending.

## 2021-07-09 NOTE — PROGRESS NOTES
Progress Notes by Rehana Kauffman MD at 7/8/2021  7:06 PM     Author: Rehana Kauffman MD Service: Pulmonology Author Type: Physician    Filed: 7/8/2021  7:23 PM Date of Service: 7/8/2021  7:06 PM Status: Addendum    : Rehana Kauffman MD (Physician)    Related Notes: Original Note by Rehana Kauffman MD (Physician) filed at 7/8/2021  7:22 PM       PULMONARY PROGRESS NOTE    Date / Time of Admission:  6/11/2021  4:30 PM    ID: Dayron Neri is a 70 y.o. male with essential hypertension who was admitted to an outside facility on 5/15/2021 with subarachnoid hemorrhage with complications of post procedure respiratory failure, status post tracheostomy and PEG tube placement on 6/7/2021; Klebsiella pneumonia, Pseudomonas pneumonia with ESBL.      Assessment:   1. Subarachnoid bleed status post craniotomy and clipping P-comm ruptured aneurysm.    2. Chronic encephalopathy secondary to above.    3. Acute respiratory failure with hypoxia status post tracheostomy 6/7/2021. Transition to Bivona 7 on 7/5/2021.  4. Acute tracheobronchitis with Pseudomonas.  Last sputum study 6/19/21 with 4+ Pseudomonas ESBL with resistance to aztreonam, meropenem, cefepime, ceftazidime, Zosyn, levofloxacin.  Initiated on tobramycin nebulizer due to concern for colonization and underlying ESBL.  5. Essential hypertension.    6. Malnourishment with dysphagia status post G-tube placement 6/7/2021.       Recommendations:     Clinical status discussed today with respiratory therapist    Tracheal secretions: Moderate, think, clear to white secretions.    Continue phase 2 weaning    TM/PMV during the day and full vent at night    Routine trach cares    Last trach change:  7 Bivona, exchange 7/5/2021    Diagnostic testing: None indicated.  Last chest x-ray 6/28/2021.    Pseudomonas pneumonia/airway colonization: Monitor clinically.  Continue tobramycin nebs Q8H, last dose scheduled 7/10/2021.    Pulmonary toilet: Continue  scheduled DuoNebs every 6 hours, hypertonic saline nebs 2x/day    Thank you for allowing our service to participate in the care of this patient.  Please call with any questions or concerns.      Total patient care time: 35 minutes, with over 50% spent in counseling/coordination of care.     Rehana Kauffman MD, MPH  Pulmonary & Critical Care Medicine  7/8/2021  7:07 PM     Subjective:   HPI:  Dayron Neri is a 70 y.o. male with essential hypertension who was admitted to an outside facility on 5/15/2021 with subarachnoid hemorrhage with complications of post procedure respiratory failure, status post tracheostomy and PEG tube placement on 6/7/2021; Klebsiella pneumonia, Pseudomonas pneumonia with ESBL.     Yesterday: tolerated 13 hours, 30% with speaking valve and trach mask    Per report, patient was more alert last week.  Initiated on ritalin yesterday.  Wife frustrated with slow recovery.      Review of Systems:  Pertinent items are noted in HPI.  Review of systems not obtained due to inability to communicate with the patient.     Problem List: Principal Problem:    SAH (subarachnoid hemorrhage) (H)  Active Problems:    Acute respiratory failure with hypoxia (H)    ESBL (extended spectrum beta-lactamase) producing bacteria infection    Attention to tracheostomy (H)    Acute and chronic respiratory failure with hypoxia (H)    On mechanically assisted ventilation (H)    Goals of care, counseling/discussion    Encephalopathy    Metabolic encephalopathy    Idiopathic hypertension    Oropharyngeal dysphagia    Moderate protein-calorie malnutrition (H)    Pseudomonas aeruginosa infection    Acute tracheobronchitis    Cerebrovascular accident (CVA) due to occlusion of cerebral artery (H)      ALLERGIES: Patient has no known allergies.    MEDS:  Reviewed.  Active include:    ? cephalexin  500 mg Enteral Tube TID   ? famotidine  20 mg Enteral Tube BID   ? guar gum  1 packet Enteral Tube TID   ? heparin (PF)  "ANTICOAGULANT  5,000 Units Subcutaneous Q8H FIXED TIMES   ? ipratropium-albuteroL  3 mL Nebulization Q6H - RT   ? lisinopriL  20 mg Enteral Tube Daily   ? menthol-zinc oxide   Topical TID   ? methylphenidate HCl  5 mg Oral 2 times per day   ? sodium chloride  3 mL Nebulization BID   ? tobramycin  80 mg Nebulization Q8H - RT     Continuous Infusions:     Objective:   VITALS:  /72 (Patient Position: Semi-barraza)   Pulse 73   Temp 98.8  F (37.1  C) (Oral)   Resp 24   Ht 5' 11\" (1.803 m)   Wt 143 lb 4.8 oz (65 kg)   SpO2 96%   BMI 19.99 kg/m    Temp:  [98.8  F (37.1  C)-99.4  F (37.4  C)] 98.8  F (37.1  C)  Heart Rate:  [73-92] 73  Resp:  [18-26] 24  BP: (107-134)/(63-72) 134/72  SpO2:  [92 %-99 %] 96 %  FiO2 (%):  [30 %-40 %] 40 %    PHYSICAL EXAM:    GEN: Sleeping but arousable.  Slow to respond. Nodding head yes/no to some questions  HEENT: Temporal wasting bilaterally.  Normocephalic, atraumatic.  Extraoccular eye movements intact, anicteric sclera. No sinus tenderness to palpation.  Moist mucous membranes.   NECK: Supple.  7 Bivona, on trach mask.  PULM: Non-labored breathing.  No use of accessory muscles.  Slightly coarse breath sounds bilateral bases.  CVS: Regular rate and rhythm.  Normal S1, S2.  No rubs, murmurs, or gallops.    ABDOMEN: Normoactive bowel sounds.  Non-tender to palpation.  Non-distended.    EXTREMITES:  No clubbing, cyanosis, or edema.    NEURO:  Sleeping but arousable.     I&O:      Intake/Output Summary (Last 24 hours) at 7/8/2021 1907  Last data filed at 7/8/2021 1809  Gross per 24 hour   Intake 4145 ml   Output 1825 ml   Net 2320 ml       PERTINENT STUDIES:  Serum Glucose range:No results for input(s): POCGLU in the last 72 hours.  ABG:  No results for input(s): PHART, EYG6MPN, PO2ART, [id number], BEARTCALC, O2SAT, FIO2 in the last 72 hours.  CBC:  Results from last 7 days   Lab Units 07/07/21  0626 07/05/21  0636 07/02/21  0528   LN-WHITE BLOOD CELL COUNT thou/uL 11.3* 10.5 " 8.3   LN-HEMOGLOBIN g/dL 10.0* 10.5* 10.4*   LN-HEMATOCRIT % 30.8* 32.7* 32.6*   LN-PLATELET COUNT thou/uL 258 325 386   LN-NEUTROPHILS RELATIVE PERCENT % 79* 78* 74*   LN-MONOCYTES RELATIVE PERCENT % 7 8 8     Chemistry:  Results from last 7 days   Lab Units 07/07/21  1527 07/05/21  1518 07/02/21  1519   LN-SODIUM mmol/L 138 139 137   LN-POTASSIUM mmol/L 4.3 4.5 4.3   LN-CHLORIDE mmol/L 98 99 100   LN-CO2 mmol/L 33* 28 26   LN-BLOOD UREA NITROGEN mg/dL 27* 28* 27*   LN-CREATININE mg/dL 0.59* 0.67* 0.63*   LN-CALCIUM mg/dL 10.7* 10.6* 10.3     Coags:  No results found for: INR, PROTIME  Cardiac Markers:         Microbiology:    ET aspirate, 6/19: Pseudomonas aeruginosa     Cardiac/Radiology Studies:   Cardiac:    EKG:   not yet available.     TTE Limited, 5/27:  Summary: Global and regional left ventricular function is normal with an EF of 60-65%. Global right ventricular function is normal. The peak velocity of the tricuspid regurgitant jet is not obtainable. Pulmonary artery systolic pressure cannot be assessed. IVC diameter and respiratory changes fall into an intermediate range suggesting an RA pressure of 8 mmHg.    Radiology:  Personally reviewed image/s and Personally reviewed impression/s    Chest X-Ray, 6/28:  Tracheostomy tube in good position. Some stable minimal fibrosis left lower lung. Chest     Outside reports reviewed: Historical medical records.    Additional Comments:  None

## 2021-07-09 NOTE — PLAN OF CARE
Plan of Care by Chito Orozco LRT at 7/10/2021  6:20 PM     Author: Chito Orozco LRT Service: Pulmonology Author Type: Respiratory Therapist    Filed: 7/10/2021  6:20 PM Date of Service: 7/10/2021  6:20 PM Status: Addendum    : Chito Orozco LRT (Respiratory Therapist)    Related Notes: Original Note by Chito Orozco LRT (Respiratory Therapist) filed at 7/9/2021  4:22 AM       Problem: Mechanical Ventilation  Goal: Patient will maintain patent airway  Outcome: Progressing  Goal: Tracheostomy will be managed safely  Outcome: Progressing  Goal: Respiratory status - ventilation  Description: Movement of air in and out of the lungs.    Liberate from ventilator  Outcome: Progressing   RT PROGRESS NOTE   [id number]  DATA:     CURRENT SETTINGS:             TRACH TYPE / SIZE:  #7 Bivona             MODE:   PRVC 14 450 +5              FIO2:   40%     ACTION:             THERAPIES:   Duoneb q6, ,Nacl bid             SUCTION:                           FREQUENCY: x4                        AMOUNT:   small/mod                        CONSISTENCY: yhick                          COLOR:    white             SPONTANEOUS COUGH EFFORT/STRENGTH OF EFFORT (not elicited by suctioning): good cough                              WEANING PHASE: 2                         WEAN MODE:  HFTM/ 40%30L                        WEAN TIME: since 0827                        END WEAN REASON: ongoing      RESPONSE:             BS: clear, diminished after sx'nd               VITAL SIGNS:                EMOTIONAL NEEDS / CONCERNS:                  RISK FOR SELF DECANNULATION:yes                          RISK DUE TO:  confused                        INTERVENTION/S IN PLACE IS/ARE:  bilateral mittens in place       NOTE / PLAN: pt to go on full vent support at night per order

## 2021-07-09 NOTE — PROGRESS NOTES
Progress Notes by Kim Knox MD at 7/9/2021 10:10 AM     Author: Kim Knox MD Service: Hospitalist Author Type: Physician    Filed: 7/9/2021 10:24 AM Date of Service: 7/9/2021 10:10 AM Status: Signed    : Kim Knox MD (Physician)          [facility] Hospitalist Daily Progress Note   Brief summary:  70 y.o. old male with past medical history of hypertension who presented to ED on 5/15/2021 for altered mental status. He had a fall and was found unresponsive by his wife. He was found to have acute subarachnoid hemorrhage.  On 5/15, he underwent left external ventricular drain placement.  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 One the same day, he also underwent craniotomy with clipping of PCOM aneurysm.  On 5/28/2021, patient had persistently elevated TCD, CT head showed bilateral BAYLEE infarct.  Next day, for persistent TCD and CTA showing bilateral BAYLEE vasospasm, was treated with milrinone and verapamil.  On 6/1/2021, angiogram showed no evidence of vasospasm.  He was extubated on 6/3/2021.On 6/6/2021, patient was reintubated due to drop in his saturation.  On 6/7/2021, patient underwent tracheostomy and PEG tube placement.  On 6/9/2021, EVD was removed.  Patient was initially started on Unasyn for possible pneumonia on 5/20/2021 which was switched to ceftriaxone the next day for sputum culture growing Klebsiella.  On 5/23, sputum culture also grew Pseudomonas for which ceftriaxone was switched to Zosyn.  Due to increased white blood cell counts, possibility of ventriculitis was raised however CSF was negative but antibiotic was switched to cefepime and vancomycin on 5/28/2021 with plan to continue for 2 weeks.  Vanco has been discontinued on 6/9/2021.  On 6/5/2021, sputum grew ESBL so cefepime was switched to meropenem.  He was transferred to [facility] on 6/11/21. Repeat sputum culture 6/19 showed resistant pseudomonas and he was started on tobramycin  nebulization. He has persistent tracheal secretions but improving.     6/14/21 CT head done for fatigue- read as slight increase in caliber of lateral and third ventricle  With possibility of developing communicating hydrocephalus. Discussed with Dr. Casillas (neurosurgeon at [facility]), who did not think there was much change, however suggested repeating CT head on 6/16/21 which did not show any change.     RT and pulmonary is following and pt is on phase 2 weaning with TM/PMV during day and full vent at night. Vent weaning slowed due to poor cough and secretions.Mental status is slowly improving. He is tolerating tube feeding.     He has severe debility and malnourishment and is progressing slowly and appears malnourishment, weakness is major factor in slow progression and at risk for set backs.    Assessment/Plan    Principal Problem:    SAH (subarachnoid hemorrhage) (H)  Active Problems:    Acute respiratory failure with hypoxia (H)    ESBL (extended spectrum beta-lactamase) producing bacteria infection    Attention to tracheostomy (H)    Acute and chronic respiratory failure with hypoxia (H)    On mechanically assisted ventilation (H)    Goals of care, counseling/discussion    Encephalopathy    Metabolic encephalopathy    Idiopathic hypertension    Oropharyngeal dysphagia    Moderate protein-calorie malnutrition (H)    Pseudomonas aeruginosa infection    Acute tracheobronchitis    Cerebrovascular accident (CVA) due to occlusion of cerebral artery (H)      Sepsis  Possible aspiration pneumonia  -Pt spiked fever with tachycardia on 7/9  -Likely aspirated feeding formula overnight and was suctioned this AM as well.  -CXR doesn't show any signs of aspiration but may be too early to see changes. Likely repeat CXray in few days if pt doesn't improve ?  -Check CBC,procalcitonin and blood cx.  -Abd xray ordered to check for feeding tube placement.  -Started on Zosyn  -ID consult placed.      Acute hypoxemic respiratory  failure  S/p tracheostomy on 6/7/21, on MV  On phase 2 weaning TM/PMV during day and full vent at night, slow weaning due to poor cough and secretions  Continue PT/OT, bronchial hygiene  Continue vent weaning per pulmonary     Pseudomonas tracheobronchitis/colonization, Has persistent tracheal secretions but improving,   Continues on tobramycin nebulization for 7 days, completing today.   Continue pulmonary toilet, scheduled duonebs     Recent subarachnoid hemorrhage  Presented with fall and unresponsiveness on 5/15/2021.  S/p EVD, angiogram showing ruptured posterior communicating aneurysm followed by craniotomy with clipping of PCOM aneurysm on the same day.  EVD removed on 6/9/2021.   Postoperative period was complicated by vasospasm, bilateral BAYLEE infarct. MRI done on 5/28 showed right frontal parietal, temopral and left parasagittal parietal lobes infarcts.   On 6/1/2021, angiogram showed no vasospasm.  Will need to follow up with Dr. Martinez in Neurosurgery clinic in 3 months with repeat CTA of head and neck for post-operative follow up     Metabolic encephalopathy: multifactorial  On restraints  CT head 7/1 no new changes  Per discussion with neurology (7/1/21) and NSG (7/7/21), pt started on trial of low dose ritalin.  Doesn't seem to help much, will increase the dose today to 10mg in AM and noon.    Hypertension-stable  On lisinopril     Oropharyngeal dysphagia  On tube feeding via PEG tube, dietitian, speech following. Tube feeds on hold pending abd xray to check for position.     Inflamed possibly sebaceous cyst, left inguinal area,   Keflex discontinued, started on Zosyn to cover inflamed cyst and possible aspiration pneumonia  Needs outpatient Surgery evaluation for removal - can follow up for both inguinal hernia and cyst as outpatient    Moderate protein-calorie malnutrition  On tube feeding, currently on hold for reasons mentioned above.  RD following     Left inguinal hernia- Monitoring  Needs  outpatient evaluation     Severe debility, weakness, critical illness, deconditioning, Continue PT/OT     DVT prophylaxis: Heparin sq  GI prophylaxis: Pepcid     Subjective:   Patient seen, examined.   Chart reviewed and events noted.  Less interactive. Denies any pain and answers briefly. No distress.      ROS  Limited, please see above.     Objective:  Vital signs    Temp:  [97.4  F (36.3  C)-102.8  F (39.3  C)] 99.6  F (37.6  C)  Heart Rate:  [] 98  Resp:  [16-38] 24  BP: ()/(56-78) 112/74  FiO2 (%):  [30 %-40 %] 40 %    Weight    Scheduled Meds:  ? famotidine  20 mg Enteral Tube BID   ? guar gum  1 packet Enteral Tube TID   ? heparin (PF) ANTICOAGULANT  5,000 Units Subcutaneous Q8H FIXED TIMES   ? ipratropium-albuteroL  3 mL Nebulization Q6H - RT   ? lisinopriL  20 mg Enteral Tube Daily   ? menthol-zinc oxide   Topical TID   ? methylphenidate HCl  5 mg Oral 2 times per day   ? piperacillin-tazobactam  3.375 g Intravenous Once    And   ? piperacillin-tazobactam  3.375 g Intravenous Q8H   ? sodium chloride  3 mL Nebulization BID   ? tobramycin  80 mg Nebulization Q8H - RT     Continuous Infusions:  PRN Meds:.acetaminophen **OR** acetaminophen, alteplase, bisacodyL, dextrose 50 % (D50W), docusate sodium, glucagon (human recombinant), hydrALAZINE, loperamide, magnesium hydroxide, naloxone **OR** naloxone **OR** naloxone **OR** naloxone, ondansetron, polyethylene glycol, sodium chloride 0.9%, sodium chloride  Intake/Output    I/O last 3 completed shifts:  In: 2267 [NG/GT:2267]  Out: 600 [Urine:600]     Physical Exam:       General Appearance:Chronically ill appearing , NAD  HEENT- NC  Neck- trach+   Lungs: Diminished BS  Cardiovascular: RRR  Abdomen: Soft, non distended  Extremities:  no edema  Neurologic: Awake but less interactive       Imaging: I have independently reviewed.     Lab Results: I have independently reviewed lab results.     All medication issues identified within the last 24 hours have  been addressed and completed as appropriate.     Barriers to disposition:  Vent and trach weaning, sepsis work up  Expected discharge- multiple days expected     Discussed with RNMARY KATE and his wife Susy over the ipad in patients room.      Kim Knox MD  Hospitalist

## 2021-07-09 NOTE — PROGRESS NOTES
Progress Notes by Kim Knox MD at 7/9/2021  7:17 AM     Author: Kim Knox MD Service: Hospitalist Author Type: Physician    Filed: 7/9/2021  7:17 AM Date of Service: 7/9/2021  7:17 AM Status: Signed    : Kim Knox MD (Physician)       PROVIDER RESTRAINT FOR NON-VIOLENT BEHAVIOR FACE TO FACE EVALUATION    Patient's Immediate Situation:  Patient demonstrated the following behaviors: Pulling/tugging at invasive lines or tubes and does not respond to verbal/non-verbal redirection    Patient's Reaction to the intervention:  Does patient understand the reason for restraint/seclusion? No    Medical Condition:  Is there any evidence of compromise of Skin integrity, Respiratory, Cardiovascular, Musculoskeletal, Hydration? No    Behavioral Condition:  In consultation with the RN, is there a need to continue this restraint or seclusion? Yes   Still trying to touch trach site  Continue mittens    See Restraint Flowsheet for complete restraint documentation and assessment.    Kim Knox MD

## 2021-07-09 NOTE — PROGRESS NOTES
Progress Notes by Marlene Evans at 7/9/2021  9:34 AM     Author: Marlene Evans Service: -- Author Type: Speech and Language Pathologist    Filed: 7/9/2021  9:41 AM Date of Service: 7/9/2021  9:34 AM Status: Signed    : Marlene Evans (Speech and Language Pathologist)       Speech Language/Pathology  Speech Therapy Daily Progress Note    Patient presents as lethargic during this session. Provided verbal/tactile cues for alertness, variable throughout session.  An  was not applicable. Wife and daughter present via Ipad.  Patient had emesis and aspiration of tube feeding, has low grade fever. Tube feeding and speaking valve currently on hold. Less responsive today, shortened session.    Objective  Autospeech tasks: 0% participation with model/visual/unison/cues    Simple yes/no ques: 0% for structured ques/no response  ----Specific to personal needs/comfort: responded yes x3 times with subtle nod/mouth lips  1-step directions: 30% with max cues/repetitions    Assessment  Decreased responsiveness, medical issues on today's date.    Plan/Recommendations  Continue per plan. If participation continues at current rate, consider increasing frequency next week.     The ST Care Plan has been reviewed and current plan remains appropriate.    16 speech/language minutes     Marlene Evans

## 2021-07-09 NOTE — PROGRESS NOTES
"Progress Notes by Sherie Dailey LICSW at 7/9/2021 12:50 PM     Author: Sherie Dailey LICSW Service: Palliative Care Author Type: Licensed Social Worker    Filed: 7/9/2021  1:03 PM Date of Service: 7/9/2021 12:50 PM Status: Signed    : Sherie Dailey LICSW (Licensed Social Worker)       Palliative Care Social Work Note    Assessment   Follow up visit with pt's wife, Susy, daughter Yolanda and colleague Maximus Mathis NP. Susy and Yolanda were on the IPAD. Matt was sleeping during our visit. Maximus gave medical update and answered their questions. Please see his note for details. Susy is concerned re the recent aspiration and what this will mean for Matt's recovery. She wishes there were more answers about the likelihood of him recovering and is struggling with the unknowns of this process. She wonders how he will be able to get stronger when he continues to have set backs and hopes that PT will be able to challenge him more. She feels he is responding well with OT. Acknowledged how powerless that can feel when you want to help your loved one.  Encouraged Susy and Yolanda to talk about their visit last weekend when Matt was more engaged and \"it was the best day he has had.\" They smiled and enjoyed talking about that special visit. Validated how hard it is to deal with the ups and downs at this time. Susy was tearful at times as she shared her feelings, hopes and experiences. Acknowledged their love and advocacy for Matt and encouraged their own self care during this challenging time. They welcomed continued support from Palliative Care team. Collaborated with LTACH team.     Clinical Social Work Interventions  Active/emapthetic listening  Validation of feelings   Processing of emotions  Emotional support    Plan of care   PC SW will continue to provide psychosocial/emotional support to Matt's family.     FADIA Christianson  Palliative Care   Office # [phone]         "

## 2021-07-09 NOTE — PLAN OF CARE
Plan of Care by Vega Justice LRT at 7/9/2021  2:32 PM     Author: Vega Justice LRT Service: Respiratory Care Author Type: Respiratory Therapist    Filed: 7/9/2021  5:34 PM Date of Service: 7/9/2021  2:32 PM Status: Signed    : Vega Justice LRT (Respiratory Therapist)         Problem: Mechanical Ventilation  Goal: Patient will maintain patent airway  Outcome: Progressing  Goal: Tracheostomy will be managed safely  Outcome: Progressing  Goal: Respiratory status - ventilation  Description: Movement of air in and out of the lungs.    Liberate from ventilator  Outcome: Progressing   RT PROGRESS NOTE     DATA:     CURRENT SETTINGS: Vent setting RR 14, 450, peep 5             TRACH TYPE / SIZE:  Bivona # 7 placed on 7-5-21             MODE:   PRVC/AC at night              FIO2:   30%     ACTION:             THERAPIES:   Duo-neb Q6 and NaCl two times a day and Q 8 Barry             SUCTION:                           FREQUENCY:   x6                        AMOUNT:   small X4, large x1, moderate x1                        CONSISTENCY:   normal thick                         COLOR:   pale yellow              SPONTANEOUS COUGH EFFORT/STRENGTH OF EFFORT (not elicited by suctioning): Yes                              WEANING PHASE:   2                        WEAN MODE:    TM days as tolerated                         WEAN TIME:   since 0825 am                        END WEAN REASON:   N/A     RESPONSE:             BS:   slightly coarse and improve after sx             VITAL SIGNS:   SpO2 94-98%, RR 20-24, HR 83-98             EMOTIONAL NEEDS / CONCERNS:                  RISK FOR SELF DECANNULATION:                          RISK DUE TO:                          INTERVENTION/S IN PLACE IS/ARE:         NOTE / PLAN:   Pt weaning on 40% TM since 0825 am, BS coarse, sx x5 for small to large normal thick pale yellow, SpO2 94-98%, neb given this shift x3, no pmv today due to emesis cuff inflated. Plan continue monitor resume PMV  .

## 2021-07-09 NOTE — PROGRESS NOTES
Progress Notes by Jimmie Gaona MD at 7/9/2021  3:09 PM     Author: Jimmie Gaona MD Service: Neurology Author Type: Physician    Filed: 7/9/2021  3:37 PM Date of Service: 7/9/2021  3:09 PM Status: Signed    : Jimmie Gaona MD (Physician)       [facility]    Assessment and Plan:    Lethargy      Recent history significant for subarachnoid hemorrhage, BAYLEE strokes, temporary extraventricular drain.  Last CT scan was just prior to transfer here to [facility].   CT 6/14 read as showing possible increased ventricular size, CT repeated 6/16, no change.    Sodium is fine now  UA was fine 6/14  Ammonia 29  hgb a bit low.      Gradually more alert over last few weeks, still waxing and waning which is expected.     Trialing methylphenidate which is reasonable.  Continue supportive care, he will continue to gradually improve for several more months barring setbacks.  Full extent of recovery cannot predict.  I don't have any other recommendations.    Neuro will sign off.  Please call if questions for us.     Subjective:     Dayron is a 70-year-old man seen at [facility] LTAC unit. He has a complex neurologic history recently. He initially presented on May 15 after he was found unresponsive by his wife. Evaluation showed acute subarachnoid hemorrhage. He had left extraventricular drain placed. 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 Subsequently he had elevated velocities on transcranial Dopplers and developed anterior cerebral artery infarcts, right worse than left. He failed extubation and was reintubated, he had trach and PEG placed. Extraventricular drain was removed. He is now transferred here to [facility] for further management. He has been weaning from the ventilator a bit, and presently is on CPAP. On Saturday he was more responsive, he was following commands nicely with occupational  "therapy.    6-15-21: A bit more awake and responsive today which is reassuring.  CT yesterday showed no acute issues, concern raised for increased ventricular size    6-17-21: no significant change from previous note    6-18-21: still lethargic, work up unremarkable so far.     7-9-21: Pt's wife says he was speaking in full sentences earlier this week with speaking valve on trach.   Had emesis yesterday.  Today he is more alert and responsive than when I last saw him.     Objective:  /60 (Patient Position: Semi-barraza)   Pulse 85   Temp 97.6  F (36.4  C) (Axillary)   Resp 20   Ht 5' 11\" (1.803 m)   Wt 142 lb 3 oz (64.5 kg)   SpO2 97%   BMI 19.83 kg/m       Eyes open  He follows commands well on both sides  Left side clearly stronger than the right   Gaze is conjugate, no nystagmus, no gaze deviation  Neck is supple, no bruits  Tone is a little increased throughout      Scheduled Meds:  ? ceftolozane-tazobactam  3 g Intravenous Q8H   ? famotidine  20 mg Enteral Tube BID   ? guar gum  1 packet Enteral Tube TID   ? heparin (PF) ANTICOAGULANT  5,000 Units Subcutaneous Q8H FIXED TIMES   ? ipratropium-albuteroL  3 mL Nebulization Q6H - RT   ? lisinopriL  20 mg Enteral Tube Daily   ? menthol-zinc oxide   Topical TID   ? methylphenidate HCl  10 mg Oral 2 times per day   ? metroNIDAZOLE  500 mg Intravenous Q12H   ? sodium chloride  3 mL Nebulization BID   ? sodium chloride  3 mL Intravenous Line Care   ? tobramycin  80 mg Nebulization Q8H - RT     Continuous Infusions:    PRN Meds:.acetaminophen **OR** acetaminophen, alteplase, bisacodyL, dextrose 50 % (D50W), docusate sodium, glucagon (human recombinant), hydrALAZINE, loperamide, magnesium hydroxide, naloxone **OR** naloxone **OR** naloxone **OR** naloxone, ondansetron, polyethylene glycol, sodium chloride 0.9%, sodium chloride bacteriostatic, sodium chloride     Jimmie Gaona MD        "

## 2021-07-09 NOTE — PROGRESS NOTES
Progress Notes by Manuelito Liu OT at 7/9/2021  1:19 PM     Author: Manuelito Liu OT Service: -- Author Type: Occupational Therapist    Filed: 7/9/2021  1:19 PM Date of Service: 7/9/2021  1:19 PM Status: Signed    : Manuelito Liu OT (Occupational Therapist)          07/09/21 1300   Cognition   Overall Cognitive Status Impaired   Arousal/Alertness Alert   Attention Span <1 min;1-3 min;With cues to redirect  (distractable by items around him)   Orientation Level Oriented to person   Following Commands Follows one step commands;With increased time;Inconsistently   Deficits Not aware of deficits   Problem Solving Assistance required to implement solutions   Comments No change in directions foolowing this date. Cont to need extra time to process directions and carry out.    Hand Function   Gross Grasp Functional  (L weaker vs R, but grossly functional)   Gross Motor Coordination Impaired   Fine Motor Coordination Impaired   Balance   Sitting Balance Dependent/total assist   Bed Mobility   Bed mobility comments dep, limited by decreased cognition/general weakness   Functional Transfers   Comments dep   Vision/Perception   Vision/Perception Impaired   Neglect Left   Comments Cont with effortful tracking/scannign to L side. Demo slight improvement with locating items to L, and holding focus better midline to L.    Assessment   OT Frequency 6x/week   Recommendations   OT Discharge Recommendation TCU;Acute rehab facility-patient can tolerate 3 hours of therapy daily   Treatment Suggestions for Next Session 7/9: g/h seated with increased time and repetition, identifying colors, visual tracking, engage LUE in all tasks- attend to L side, encourage Pt to talk and respond more during session   OT Summary Pt seen at bedside for OT session. Per chart/RN did have a bad night with vomitting and aspiration. Adjusted session to be completed in bed d/t several assessments being completed througout morning regarding  aspiration. No sign change with alertness, following directions this date. Cont to need phys A and cues to initiated tasks. Does better with familiar tasks. Cont limitation to be dexcreased cognition/general weakness/decreased awareness of L side- visual neglect and decreased motor planning. COnt skilled OT to address these areas.

## 2021-07-10 PROBLEM — I60.9 SAH (SUBARACHNOID HEMORRHAGE) (H): Status: ACTIVE | Noted: 2021-07-10

## 2021-07-10 LAB
ERYTHROCYTE [DISTWIDTH] IN BLOOD BY AUTOMATED COUNT: 14.8 % (ref 11–14.5)
HCT VFR BLD AUTO: 28.7 % (ref 40–54)
HGB BLD-MCNC: 9.3 G/DL (ref 14–18)
HGB BLD-MCNC: 9.3 G/DL (ref 14–18)
MCH RBC QN AUTO: 31.5 PG (ref 27–34)
MCHC RBC AUTO-ENTMCNC: 32.4 G/DL (ref 32–36)
MCV RBC AUTO: 97 FL (ref 80–100)
PLATELET # BLD AUTO: 247 THOU/UL (ref 140–440)
PMV BLD AUTO: 10 FL (ref 8.5–12.5)
RBC # BLD AUTO: 2.95 MILL/UL (ref 4.4–6.2)
WBC: 22.7 THOU/UL (ref 4–11)

## 2021-07-10 PROCEDURE — 36415 COLL VENOUS BLD VENIPUNCTURE: CPT

## 2021-07-10 PROCEDURE — 999N001212 HC REV CODE 9999 CHARGE CONVERSION OPNP: Mod: GP

## 2021-07-10 PROCEDURE — 94640 AIRWAY INHALATION TREATMENT: CPT

## 2021-07-10 PROCEDURE — 85018 HEMOGLOBIN: CPT

## 2021-07-10 PROCEDURE — 97110 THERAPEUTIC EXERCISES: CPT | Mod: GP

## 2021-07-10 PROCEDURE — 85027 COMPLETE CBC AUTOMATED: CPT

## 2021-07-10 PROCEDURE — 94003 VENT MGMT INPAT SUBQ DAY: CPT

## 2021-07-10 RX ORDER — LISINOPRIL 10 MG/1
10 TABLET ORAL DAILY
Status: DISCONTINUED | OUTPATIENT
Start: 2021-07-11 | End: 2021-07-10

## 2021-07-10 RX ORDER — NALOXONE HYDROCHLORIDE 0.4 MG/ML
0.2 INJECTION, SOLUTION INTRAMUSCULAR; INTRAVENOUS; SUBCUTANEOUS
Status: DISCONTINUED | OUTPATIENT
Start: 2021-07-11 | End: 2021-09-27 | Stop reason: HOSPADM

## 2021-07-10 RX ORDER — ACETAMINOPHEN 650 MG/20.3ML
650 LIQUID ORAL EVERY 4 HOURS PRN
Status: DISCONTINUED | OUTPATIENT
Start: 2021-07-11 | End: 2021-08-10

## 2021-07-10 RX ORDER — LOPERAMIDE HYDROCHLORIDE 1 MG/5ML
1 SOLUTION ORAL 3 TIMES DAILY PRN
Status: DISCONTINUED | OUTPATIENT
Start: 2021-07-11 | End: 2021-07-23 | Stop reason: CLARIF

## 2021-07-10 RX ORDER — BISACODYL 10 MG
10 SUPPOSITORY, RECTAL RECTAL DAILY PRN
Status: DISCONTINUED | OUTPATIENT
Start: 2021-07-11 | End: 2021-09-27 | Stop reason: HOSPADM

## 2021-07-10 RX ORDER — NALOXONE HYDROCHLORIDE 0.4 MG/ML
0.4 INJECTION, SOLUTION INTRAMUSCULAR; INTRAVENOUS; SUBCUTANEOUS
Status: DISCONTINUED | OUTPATIENT
Start: 2021-07-11 | End: 2021-09-27 | Stop reason: HOSPADM

## 2021-07-10 RX ORDER — LISINOPRIL 20 MG/1
20 TABLET ORAL DAILY
Status: DISCONTINUED | OUTPATIENT
Start: 2021-07-11 | End: 2021-07-14

## 2021-07-10 RX ORDER — FAMOTIDINE 20 MG/1
20 TABLET, FILM COATED ORAL 2 TIMES DAILY
Status: DISCONTINUED | OUTPATIENT
Start: 2021-07-11 | End: 2021-09-27 | Stop reason: HOSPADM

## 2021-07-10 RX ORDER — HYDRALAZINE HYDROCHLORIDE 20 MG/ML
10 INJECTION INTRAMUSCULAR; INTRAVENOUS EVERY 6 HOURS PRN
Status: DISCONTINUED | OUTPATIENT
Start: 2021-07-11 | End: 2021-09-27 | Stop reason: HOSPADM

## 2021-07-10 RX ORDER — FAMOTIDINE 20 MG/1
20 TABLET, FILM COATED ORAL 2 TIMES DAILY
Status: DISCONTINUED | OUTPATIENT
Start: 2021-07-11 | End: 2021-07-10

## 2021-07-10 RX ORDER — SODIUM CHLORIDE 9 MG/ML
INJECTION, SOLUTION INTRAVENOUS CONTINUOUS
Status: DISCONTINUED | OUTPATIENT
Start: 2021-07-11 | End: 2021-07-13

## 2021-07-10 RX ORDER — ACETAMINOPHEN 325 MG/1
650 TABLET ORAL EVERY 4 HOURS PRN
Status: DISCONTINUED | OUTPATIENT
Start: 2021-07-11 | End: 2021-08-10

## 2021-07-10 RX ORDER — IPRATROPIUM BROMIDE AND ALBUTEROL SULFATE 2.5; .5 MG/3ML; MG/3ML
3 SOLUTION RESPIRATORY (INHALATION) EVERY 6 HOURS
Status: DISCONTINUED | OUTPATIENT
Start: 2021-07-11 | End: 2021-07-11

## 2021-07-10 RX ORDER — DEXTROSE MONOHYDRATE 25 G/50ML
25-50 INJECTION, SOLUTION INTRAVENOUS
Status: DISCONTINUED | OUTPATIENT
Start: 2021-07-11 | End: 2021-09-27 | Stop reason: HOSPADM

## 2021-07-10 RX ORDER — GUAR GUM
1 PACKET (EA) ORAL 3 TIMES DAILY
Status: DISCONTINUED | OUTPATIENT
Start: 2021-07-11 | End: 2021-07-23

## 2021-07-10 RX ORDER — NICOTINE POLACRILEX 4 MG
15-30 LOZENGE BUCCAL
Status: DISCONTINUED | OUTPATIENT
Start: 2021-07-11 | End: 2021-07-10

## 2021-07-10 RX ORDER — POLYETHYLENE GLYCOL 3350 17 G/17G
17 POWDER, FOR SOLUTION ORAL DAILY PRN
Status: DISCONTINUED | OUTPATIENT
Start: 2021-07-11 | End: 2021-09-27 | Stop reason: HOSPADM

## 2021-07-10 RX ORDER — SODIUM CHLORIDE FOR INHALATION 3 %
3 VIAL, NEBULIZER (ML) INHALATION 2 TIMES DAILY
Status: DISCONTINUED | OUTPATIENT
Start: 2021-07-11 | End: 2021-07-11

## 2021-07-10 RX ORDER — DOCUSATE SODIUM 100 MG/1
100 CAPSULE, LIQUID FILLED ORAL 2 TIMES DAILY PRN
Status: DISCONTINUED | OUTPATIENT
Start: 2021-07-11 | End: 2021-09-27 | Stop reason: HOSPADM

## 2021-07-10 RX ORDER — HEPARIN SODIUM 5000 [USP'U]/.5ML
5000 INJECTION, SOLUTION INTRAVENOUS; SUBCUTANEOUS EVERY 8 HOURS
Status: DISCONTINUED | OUTPATIENT
Start: 2021-07-11 | End: 2021-09-27 | Stop reason: HOSPADM

## 2021-07-10 RX ORDER — ONDANSETRON HYDROCHLORIDE 4 MG/5ML
4 SOLUTION ORAL EVERY 8 HOURS PRN
Status: DISCONTINUED | OUTPATIENT
Start: 2021-07-11 | End: 2021-09-27 | Stop reason: HOSPADM

## 2021-07-10 RX ORDER — METHYLPHENIDATE HYDROCHLORIDE 10 MG/1
10 TABLET ORAL 2 TIMES DAILY
Status: DISCONTINUED | OUTPATIENT
Start: 2021-07-11 | End: 2021-07-15

## 2021-07-10 RX ORDER — NYSTATIN 100000/ML
500000 SUSPENSION, ORAL (FINAL DOSE FORM) ORAL 4 TIMES DAILY
Status: DISCONTINUED | OUTPATIENT
Start: 2021-07-11 | End: 2021-07-24

## 2021-07-10 ASSESSMENT — MIFFLIN-ST. JEOR
SCORE: 1419.91
SCORE: 1419.91
SCORE: 1420.13

## 2021-07-10 NOTE — PROGRESS NOTES
Progress Notes by Tomas Cormier MD at 7/10/2021  3:40 PM     Author: Tomas Cormier MD Service: Hospitalist Author Type: Physician    Filed: 7/10/2021  3:40 PM Date of Service: 7/10/2021  3:40 PM Status: Signed    : Tomas Cormier MD (Physician)       PROVIDER RESTRAINT FOR NON-VIOLENT BEHAVIOR FACE TO FACE EVALUATION    Patient's Immediate Situation:  Patient demonstrated the following behaviors: Pulling/tugging at invasive lines or tubes and does not respond to verbal/non-verbal redirection    Patient's Reaction to the intervention:  Does patient understand the reason for restraint/seclusion? No    Medical Condition:  Is there any evidence of compromise of Skin integrity, Respiratory, Cardiovascular, Musculoskeletal, Hydration? No    Behavioral Condition:  In consultation with the RN, is there a need to continue this restraint or seclusion? Yes    See Restraint Flowsheet for complete restraint documentation and assessment.    Tomas Cormier MD

## 2021-07-10 NOTE — PLAN OF CARE
Plan of Care by Amanda Jean RN at 7/9/2021 11:20 PM     Author: Amanda Jean RN Service: -- Author Type: Registered Nurse    Filed: 7/9/2021 11:22 PM Date of Service: 7/9/2021 11:20 PM Status: Signed    : Amanda Jean RN (Registered Nurse)         Problem: Daily Care  Goal: Daily care needs are met  Outcome: Progressing   Patient was alert, appeared comfortable most part of the shift. PRN Tylenol given for neck pain at hs with effect.

## 2021-07-10 NOTE — PLAN OF CARE
Plan of Care by Leticia Caballero RN at 7/10/2021  7:50 AM     Author: Leticia Caballero RN Service: -- Author Type: Registered Nurse    Filed: 7/10/2021  7:59 AM Date of Service: 7/10/2021  7:50 AM Status: Signed    : Leticia Caballero RN (Registered Nurse)         Problem: Daily Care  Goal: Daily care needs are met  Outcome: Progressing       Pt's VSS t/o the night, slept off/on for short periods of time. Pt noted to have his eyes open majority of the time when staff entered room, lying calmly in bed. Continue to wear bilateral mitts on hands to prevent pt from pulling @ lines/tubes. Has been on the vent during the night 35-40% FIO2. LS are coarse sounding, sats %. Has pending blood cx & sputum cx results. Received scheduled IV abx. Noted white coating on tongue despite brushing tongue with tooth brush, asking MD for Nystatin order. Condom catheter remains in place. Pt's TF rate was increased to 60 ml @ midnight. Tolerating rate increase w/o problems. Residual = 12ml, plus free air. Pt denies any c/o pain.           none

## 2021-07-10 NOTE — PROGRESS NOTES
Progress Notes by Tomas Cormier MD at 7/10/2021  7:37 AM     Author: Tomas Cormier MD Service: Hospitalist Author Type: Physician    Filed: 7/10/2021 11:48 AM Date of Service: 7/10/2021  7:37 AM Status: Signed    : Tomas Comrier MD (Physician)          [facility] Hospitalist Daily Progress Note   Brief summary:  71 yo M with h/o HTN who presented to ED on 5/15/2021 with acute SAH after a fall with unresponsiveness.  He underwent emergent left external ventricular drain placement.  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  On 5/28/2021, patient had persistent cerebral vasospasm, CT head showed bilateral BAYLEE infarct and was treated with milrinone and verapamil.  On 6/1/2021, angiogram showed no evidence of vasospasm.  Extubated on 6/3/2021.  6/6/2021 he was reintubated due to hypoxia.  On 6/7/2021, patient underwent tracheostomy and PEG tube placement.  EVD removed 6/9/2021.  Unasyn for possible pneumonia on 5/20/2021 which was switched to ceftriaxone the next day for sputum culture growing Klebsiella.  On 5/23, sputum culture also grew Pseudomonas so switched to Zosyn.  Due to increased WBCs in CSF possibility of ventriculitis was raised however CSF culture was negative but antibiotic was switched to cefepime and vancomycin on 5/28/2021 for 2 weeks.  Vanco was discontinued on 6/9/2021.  On 6/5/2021, sputum grew ESBL so cefepime was switched to meropenem.  He was transferred to [facility] on 6/11/21. Repeat sputum culture 6/19 showed MDR pseudomonas and he was started on tobramycin nebs from 6/24/2021 - 7/9/2021 . He has persistent tracheal secretions.     6/14/21 CT head done for fatigue- read as slight increase in caliber of lateral and third ventricle with possibility of developing communicating hydrocephalus. Discussed with Dr. Casillas (neurosurgeon at [facility]), who did not think there was much change, so  repeat CT head on 6/16/21 was done which did not show any change.     RT and pulmonary are following and pt is on phase 2 weaning with TM/PMV during day and full vent at night. Vent weaning slowed due to poor cough and secretions.Mental status is slowly improving. He is tolerating tube feeding.    7/8/2021 aspirated tube feeds.  Spiked a fever that evening 102.8 and WBC went up to 36.9 the next morning so ID reconsulted and has been started on ceftazadime-avibactam along with flagyl 7/9/2021. WBC improving.     He has severe debility and malnourishment and is progressing slowly and appears malnourishment, weakness is major factor in slow progression and at risk for set backs.    Assessment/Plan    Principal Problem:    SAH (subarachnoid hemorrhage) (H)  Active Problems:    Acute respiratory failure with hypoxia (H)    ESBL (extended spectrum beta-lactamase) producing bacteria infection    Attention to tracheostomy (H)    Acute and chronic respiratory failure with hypoxia (H)    On mechanically assisted ventilation (H)    Encounter for palliative care    Encephalopathy    Metabolic encephalopathy    Idiopathic hypertension    Oropharyngeal dysphagia    Moderate protein-calorie malnutrition (H)    Pseudomonas aeruginosa infection    Acute tracheobronchitis    Cerebrovascular accident (CVA) due to occlusion of cerebral artery (H)    Aspiration pneumonia due to gastric secretions, unspecified laterality, unspecified part of lung (H)      Sepsis due to aspiration pneumonia  -Pt spiked fever with tachycardia on evening of 7/8  -Likely aspirated feeding formula as tube feeds suctioned from trach  -CXR early 7/9 didn't show any signs of aspiration but may be too early to see changes. 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   Continue pulmonary toilet, scheduled duonebs     Recent subarachnoid hemorrhage  Presented with fall and unresponsiveness on 5/15/2021.  S/p EVD, angiogram showing ruptured posterior communicating aneurysm followed by craniotomy with clipping of PCOM aneurysm on the same day.  EVD removed on 6/9/2021.   Postoperative period was complicated by cerebral vasospasm, bilateral BAYLEE infarct. MRI done on 5/28 showed right frontal parietal, temopral and left parasagittal parietal lobes infarcts.   On 6/1/2021, angiogram showed no vasospasm.  Will need to follow up with Dr. Martinez in Neurosurgery clinic in 3 months with repeat CTA of head and neck for post-operative follow up     Metabolic encephalopathy: multifactorial  On restraints  CT head 7/1 no new changes  Per discussion with neurology (7/1/21) and NSG (7/7/21), pt started on trial of low dose ritalin.  Doesn't seem to help much, dose increased to 10mg in AM and noon on 7/9/2021 - plan for 7 days - if no improvement then would discontinue per neurology.    Anemia - likely due to anemia of chronic disease at this point, but did drop from yesterday 1 gm so will need to follow and watch for signs of bleeding.    Hypertension-stable  On lisinopril     Oropharyngeal dysphagia  On tube feeding via PEG tube, dietitian, speech following.      Inflamed possibly sebaceous cyst, left inguinal area,   Keflex discontinued - now on Abx as above per ID.  Needs outpatient Surgery evaluation for removal - can follow up for both inguinal hernia and cyst as  outpatient    Moderate protein-calorie malnutrition  On tube feeding  RD following     Left inguinal hernia- Monitoring  Needs outpatient evaluation     Severe debility, weakness, critical illness, deconditioning, Continue PT/OT     DVT prophylaxis: Heparin sq  GI prophylaxis: Pepcid     Subjective:   Patient seen, examined.   Chart reviewed and events noted.  Less interactive. No distress.  Does not answer any questions.      ROS  Limited, please see above.     Objective:  Vital signs    Temp:  [97.6  F (36.4  C)-99.6  F (37.6  C)] 98.8  F (37.1  C)  Heart Rate:  [] 101  Resp:  [16-25] 20  BP: (112-127)/(60-74) 127/74  FiO2 (%):  [35 %-40 %] 35 %    Weight    Scheduled Meds:  ? ceftazidime-avibactam (AVYCAZ) IVPB  2.5 g Intravenous Q8H   ? famotidine  20 mg Enteral Tube BID   ? guar gum  1 packet Enteral Tube TID   ? heparin (PF) ANTICOAGULANT  5,000 Units Subcutaneous Q8H FIXED TIMES   ? ipratropium-albuteroL  3 mL Nebulization Q6H - RT   ? lisinopriL  20 mg Enteral Tube Daily   ? menthol-zinc oxide   Topical TID   ? methylphenidate HCl  10 mg Oral 2 times per day   ? metroNIDAZOLE  500 mg Intravenous Q12H   ? nystatin  5 mL Swish & Swallow QID   ? sodium chloride  3 mL Nebulization BID   ? sodium chloride  3 mL Intravenous Line Care     Continuous Infusions:  PRN Meds:.acetaminophen **OR** acetaminophen, alteplase, bisacodyL, dextrose 50 % (D50W), docusate sodium, glucagon (human recombinant), hydrALAZINE, loperamide, magnesium hydroxide, naloxone **OR** naloxone **OR** naloxone **OR** naloxone, ondansetron, polyethylene glycol, sodium chloride 0.9%, sodium chloride bacteriostatic, sodium chloride  Intake/Output    I/O last 3 completed shifts:  In: 727 [I.V.:28; NG/GT:584; IV Piggyback:115]  Out: 1075 [Urine:1075]     Physical Exam:       General Appearance:Chronically ill appearing , NAD  HEENT- NC  Neck- trach+   Lungs: Diminished BS  Cardiovascular: RRR  Abdomen: Soft, non distended  Extremities:  no  edema  Neurologic: Awake, follows a few simple commands, sticks out tongue, move RUE, wriggles toes       Imaging: I have independently reviewed reports.     Lab Results: I have independently reviewed lab results.     All medication issues identified within the last 24 hours have been addressed and completed as appropriate.     Barriers to disposition:  Vent and trach weaning, sepsis work up  Expected discharge- multiple days expected     Discussed with his wife Susy in patients room.      Tomas Cormier MD  Hospitalist

## 2021-07-10 NOTE — PROGRESS NOTES
Progress Notes by Rehana Kauffman MD at 7/9/2021  2:51 PM     Author: Rehana Kauffman MD Service: Pulmonology Author Type: Physician    Filed: 7/9/2021  7:21 PM Date of Service: 7/9/2021  2:51 PM Status: Addendum    : Rehana Kauffman MD (Physician)    Related Notes: Original Note by Rehana Kauffman MD (Physician) filed at 7/9/2021  7:20 PM       PULMONARY PROGRESS NOTE    Date / Time of Admission:  6/11/2021  4:30 PM    ID: Dayron Neri is a 70 y.o. male with essential hypertension who was admitted to an outside facility on 5/15/2021 with subarachnoid hemorrhage with complications of post procedure respiratory failure, status post tracheostomy and PEG tube placement on 6/7/2021; Klebsiella pneumonia, Pseudomonas pneumonia with ESBL.      Assessment:   1. Subarachnoid bleed status post craniotomy and clipping P-comm ruptured aneurysm.    2. Chronic encephalopathy secondary to above.    3. Acute respiratory failure with hypoxia status post tracheostomy 6/7/2021. Transition to Bivona 7 on 7/5/2021.  4. Possible aspiration pneumonia vs pneumonitis.  Concern for aspiration pneumonia given emesis event although O2 needs relatively stable and remains on trach mask.  Initiated on ceftazidime-avibactam and metronidazole on 7/9.    5. Acute tracheobronchitis with Pseudomonas.  Last sputum study 6/19/21 with 4+ Pseudomonas ESBL with resistance to aztreonam, meropenem, cefepime, ceftazidime, Zosyn, levofloxacin.  Initiated on tobramycin nebulizer due to concern for colonization and underlying ESBL.  Completed treatment with tobramycin nebs 7/9.  6. Essential hypertension.    7. Malnourishment with dysphagia status post G-tube placement 6/7/2021.    8. Leukocytosis, unspecified.       Recommendations:     Clinical status discussed today with respiratory therapist    Tracheal secretions: Moderate, think, clear to white secretions.    Continue phase 2 weaning as tolerates - did not utilize speaking  valve today given emesis event.  Will monitor respiratory status over next 24 hours before re-initiation.    TM/PMV during the day and full vent at night    Routine trach cares    Last trach change:  7 Bivona, exchange 7/5/2021    Diagnostic testing: Last chest x-ray 7/9/2021 given leukocytosis and emesis event.    Pseudomonas pneumonia/airway colonization: Monitor clinically.     Antibiotics:     Completed inhaled tobramycin nebs 7/9/21    Continue ceftazidime-avibactam IV (start 7/9) and metronidazole IV (start 7/9) per ID recommendations    Follow-up blood cultures, ET aspirate from 7/9    Repeat CBC in AM for follow-up of leukocytosis.    Pulmonary toilet: Continue scheduled DuoNebs every 6 hours, hypertonic saline nebs 2x/day    Thank you for allowing our service to participate in the care of this patient.  Please call with any questions or concerns.  Discussed care with patient's wife and daughter 7/9.    Total patient care time: 35 minutes, with over 50% spent in counseling/coordination of care.     Rehana Kauffman MD, MPH  Pulmonary & Critical Care Medicine  7/9/2021  2:51 PM     Subjective:   HPI:  Dayron Neri is a 70 y.o. male with essential hypertension who was admitted to an outside facility on 5/15/2021 with subarachnoid hemorrhage with complications of post procedure respiratory failure, status post tracheostomy and PEG tube placement on 6/7/2021; Klebsiella pneumonia, Pseudomonas pneumonia with ESBL.     Large emesis earlier today today so didn't use speaking valve.  Currently on 40% trach mask.  CXR without new consolidations but there was increased discoid atelectasis at left lung base.  WBC up to 39.6 K, initially given Zosyn IV and switched to Flagyl and Avycaz per ID.    Yesterday: tolerated 12 hours, 33 minutes with speaking valve and trach mask, FiO2 30%    Per report, patient was more alert last week.  Initiated on ritalin yesterday.  Wife frustrated with slow recovery.      Review of  "Systems:  Pertinent items are noted in HPI.  Review of systems not obtained due to inability to communicate with the patient.     Problem List: Principal Problem:    SAH (subarachnoid hemorrhage) (H)  Active Problems:    Acute respiratory failure with hypoxia (H)    ESBL (extended spectrum beta-lactamase) producing bacteria infection    Attention to tracheostomy (H)    Acute and chronic respiratory failure with hypoxia (H)    On mechanically assisted ventilation (H)    Encounter for palliative care    Encephalopathy    Metabolic encephalopathy    Idiopathic hypertension    Oropharyngeal dysphagia    Moderate protein-calorie malnutrition (H)    Pseudomonas aeruginosa infection    Acute tracheobronchitis    Cerebrovascular accident (CVA) due to occlusion of cerebral artery (H)      ALLERGIES: Patient has no known allergies.    MEDS:  Reviewed.  Active include:    ? famotidine  20 mg Enteral Tube BID   ? guar gum  1 packet Enteral Tube TID   ? heparin (PF) ANTICOAGULANT  5,000 Units Subcutaneous Q8H FIXED TIMES   ? ipratropium-albuteroL  3 mL Nebulization Q6H - RT   ? lisinopriL  20 mg Enteral Tube Daily   ? menthol-zinc oxide   Topical TID   ? methylphenidate HCl  10 mg Oral 2 times per day   ? piperacillin-tazobactam  3.375 g Intravenous Q8H   ? sodium chloride  3 mL Nebulization BID   ? sodium chloride  3 mL Intravenous Line Care   ? tobramycin  80 mg Nebulization Q8H - RT     Continuous Infusions:     Objective:   VITALS:  /74 (Patient Position: Lying)   Pulse 84   Temp 99.6  F (37.6  C) (Axillary)   Resp 20   Ht 5' 11\" (1.803 m)   Wt 142 lb 3 oz (64.5 kg)   SpO2 98%   BMI 19.83 kg/m    Temp:  [97.4  F (36.3  C)-102.8  F (39.3  C)] 99.6  F (37.6  C)  Heart Rate:  [] 84  Resp:  [16-38] 20  BP: ()/(56-78) 112/74  SpO2:  [92 %-98 %] 98 %  FiO2 (%):  [30 %-40 %] 40 %    PHYSICAL EXAM:    GEN: Awake, intermittently answering questions but slow to respond.  Wearing hand mitts.   HEENT: Temporal " wasting bilaterally.  Normocephalic, atraumatic.  Extraoccular eye movements intact, anicteric sclera. No sinus tenderness to palpation.  Moist mucous membranes.   NECK: Supple.  7 Bivona, on trach mask.  PULM: Non-labored breathing.  No use of accessory muscles.  Slightly coarse breath sounds bilateral bases.  CVS: Regular rate and rhythm.  Normal S1, S2.  No rubs, murmurs, or gallops.    ABDOMEN: Normoactive bowel sounds.  Non-tender to palpation.  Non-distended.    EXTREMITES:  No clubbing, cyanosis, or edema.    NEURO:  Awake, moving upper extremities, mouthing responses    I&O:      Intake/Output Summary (Last 24 hours) at 7/9/2021 1451  Last data filed at 7/9/2021 1323  Gross per 24 hour   Intake 1497 ml   Output 650 ml   Net 847 ml       PERTINENT STUDIES:  Serum Glucose range:No results for input(s): POCGLU in the last 72 hours.  ABG:  No results for input(s): PHART, YXW4XMD, PO2ART, XLO6PYTKDIK, BEARTCALC, O2SAT, FIO2 in the last 72 hours.  CBC:  Results from last 7 days   Lab Units 07/09/21  0905 07/07/21  0626 07/05/21  0636   LN-WHITE BLOOD CELL COUNT thou/uL 36.9* 11.3* 10.5   LN-HEMOGLOBIN g/dL 10.3* 10.0* 10.5*   LN-HEMATOCRIT % 31.3* 30.8* 32.7*   LN-PLATELET COUNT thou/uL 266 258 325   LN-NEUTROPHILS RELATIVE PERCENT % 91* 79* 78*   LN-MONOCYTES RELATIVE PERCENT % 4 7 8     Chemistry:  Results from last 7 days   Lab Units 07/07/21  1527 07/05/21  1518 07/02/21  1519   LN-SODIUM mmol/L 138 139 137   LN-POTASSIUM mmol/L 4.3 4.5 4.3   LN-CHLORIDE mmol/L 98 99 100   LN-CO2 mmol/L 33* 28 26   LN-BLOOD UREA NITROGEN mg/dL 27* 28* 27*   LN-CREATININE mg/dL 0.59* 0.67* 0.63*   LN-CALCIUM mg/dL 10.7* 10.6* 10.3     Coags:  No results found for: INR, PROTIME  Cardiac Markers:         Microbiology:    ET aspirate, 6/19: Pseudomonas aeruginosa    Blood cultures x 2, 7/9:  Collected and pending    Sputum culture, 7/9:  Collected and pending     Cardiac/Radiology Studies:   Cardiac:    EKG:   not yet available.      TTE Limited, 5/27:  Summary: Global and regional left ventricular function is normal with an EF of 60-65%. Global right ventricular function is normal. The peak velocity of the tricuspid regurgitant jet is not obtainable. Pulmonary artery systolic pressure cannot be assessed. IVC diameter and respiratory changes fall into an intermediate range suggesting an RA pressure of 8 mmHg.    Radiology:  Personally reviewed image/s and Personally reviewed impression/s    Chest X-Ray, 7/9:  Tracheostomy tube projects over tracheal air column. Increased discoid atelectasis at the left lung base. No other new focal consolidation. No pneumothorax or pleural effusion. Cardiac silhouette within normal limits. Stable tortuous aorta.    Outside reports reviewed: Historical medical records.    Additional Comments:  None

## 2021-07-10 NOTE — PLAN OF CARE
Plan of Care by Delvis Young RN at 7/10/2021  2:31 PM     Author: Delvis Young RN Service: -- Author Type: Registered Nurse    Filed: 7/10/2021  2:34 PM Date of Service: 7/10/2021  2:31 PM Status: Signed    : Delvis Young RN (Registered Nurse)         Problem: Pain  Goal: Patient's pain/discomfort is manageable  Outcome: Progressing     Problem: Daily Care  Goal: Daily care needs are met  Outcome: Progressing     Problem: Potential for transmission of organism by Contact, Enteric, Droplet and/or Airborne routes  Goal: Prevent transmission of organisms  Outcome: Progressing     Problem: Risk of Injury Due to Unsafe Behavior  Goal: Patient will remain safe while in restraint; physical/psychological needs will be met  Outcome: Progressing   Pt was having low grade fever 100.1, prn tylenol was helpful and temp came garret to 98.3. he was sleeping on and off through the day. Family here to visit. He is on restraint and monitored for safety.

## 2021-07-10 NOTE — PLAN OF CARE
Plan of Care by Nahomy Razo LRT at 7/10/2021  1:48 AM     Author: Nahomy Razo LRT Service: -- Author Type: Respiratory Therapist    Filed: 7/10/2021  5:54 AM Date of Service: 7/10/2021  1:48 AM Status: Signed    : Nahomy Razo LRT (Respiratory Therapist)       Problem: Mechanical Ventilation  Goal: Patient will maintain patent airway  Outcome: Progressing  Goal: Tracheostomy will be managed safely  Outcome: Progressing  Goal: Respiratory status - ventilation  Description: Movement of air in and out of the lungs.    Liberate from ventilator  Outcome: Progressing   RT PROGRESS NOTE     DATA:     CURRENT SETTINGS: 14, 450, peep 5             TRACH TYPE / SIZE:  Bivona # 7 placed on 7-5-21             MODE:   PRVC/AC              FIO2:   30%     ACTION:             THERAPIES:   Duo-neb Q6 and NaCl two times a day and Q 8 Barry             SUCTION:                           FREQUENCY:   x4                        AMOUNT:  small                        CONSISTENCY:   normal thick                         COLOR:   white             SPONTANEOUS COUGH EFFORT/STRENGTH OF EFFORT (not elicited by suctioning): Good                              WEANING PHASE:   2                        WEAN MODE:    TM days as tolerated                         WEAN TIME:   13hrs                        END WEAN REASON:   for night      RESPONSE:             BS:  few coarse              VITAL SIGNS:   SpO2 94-98%, RR 20-24, HR 83-98             EMOTIONAL NEEDS / CONCERNS:   No               RISK FOR SELF DECANNULATION:yes                         RISK DUE TO:                          INTERVENTION/S IN PLACE IS/ARE:         NOTE / PLAN: continue current plan of care

## 2021-07-10 NOTE — PROGRESS NOTES
Information for this encounter is contained in 2 separate electronic health records.  This is due to a system change that occurred between July 10-11, 2021.  Please refer to both EHR's for the complete record.

## 2021-07-10 NOTE — PROGRESS NOTES
Progress Notes by Dayrno Carr PT at 7/10/2021  8:19 AM     Author: Dayron Carr PT Service: -- Author Type: Physical Therapist    Filed: 7/10/2021  8:19 AM Date of Service: 7/10/2021  8:19 AM Status: Signed    : Dayron Carr PT (Physical Therapist)       Physical Therapy     07/09/21 1330   Visit Specifics   Bed/Tabs/Pad Alarm Applied Yes   Subjective Patient Comments Pt. very lethargic.  Pt. had emesis with asp last noc, now on abx.  No active particpation but eyes open ~50% of session   Treatment Time   Theraputic Exercise 23   ROM   PROM B LE;Supine   Stretching   Heel Cord Supine;Bilat   Hamstring Supine;Bilat   Piriformis Supine;Bilat   Other Stretches Supine;Bilat   Response to Treatment   Response to Treatment Treatment limited secondary to medical complications (Comment)   Plan   PT Frequency 5x/week   Recommendation   PT Discharge Recommendation TCU;SNF   Treatment Suggestions for Next Session SF if help available, sit balance, bed mobility, LE ex   PT Summary Session very limited today due to medical complication of aspirating tube feed during emesis last night   PT Care Plan REVIEWED DAILY Yes, goals remain appropriate

## 2021-07-11 LAB
BASOPHILS # BLD AUTO: 0 10E3/UL (ref 0–0.2)
BASOPHILS NFR BLD AUTO: 0 %
EOSINOPHIL # BLD AUTO: 0.1 10E3/UL (ref 0–0.7)
EOSINOPHIL NFR BLD AUTO: 1 %
ERYTHROCYTE [DISTWIDTH] IN BLOOD BY AUTOMATED COUNT: 14.4 % (ref 10–15)
ERYTHROCYTE [DISTWIDTH] IN BLOOD BY AUTOMATED COUNT: 14.6 % (ref 10–15)
HCT VFR BLD AUTO: 28.4 % (ref 40–53)
HCT VFR BLD AUTO: 30.3 % (ref 40–53)
HGB BLD-MCNC: 9.1 G/DL (ref 13.3–17.7)
HGB BLD-MCNC: 9.5 G/DL (ref 13.3–17.7)
HOLD SPECIMEN: NORMAL
IMM GRANULOCYTES # BLD: 0.1 10E3/UL
IMM GRANULOCYTES NFR BLD: 1 %
LACTATE SERPL-SCNC: 0.7 MMOL/L (ref 0.7–2)
LACTATE SERPL-SCNC: 1 MMOL/L (ref 0.7–2)
LYMPHOCYTES # BLD AUTO: 0.9 10E3/UL (ref 0.8–5.3)
LYMPHOCYTES NFR BLD AUTO: 7 %
MCH RBC QN AUTO: 31.1 PG (ref 26.5–33)
MCH RBC QN AUTO: 31.6 PG (ref 26.5–33)
MCHC RBC AUTO-ENTMCNC: 31.4 G/DL (ref 31.5–36.5)
MCHC RBC AUTO-ENTMCNC: 32 G/DL (ref 31.5–36.5)
MCV RBC AUTO: 99 FL (ref 78–100)
MCV RBC AUTO: 99 FL (ref 78–100)
MONOCYTES # BLD AUTO: 0.8 10E3/UL (ref 0–1.3)
MONOCYTES NFR BLD AUTO: 6 %
NEUTROPHILS # BLD AUTO: 11.6 10E3/UL (ref 1.6–8.3)
NEUTROPHILS NFR BLD AUTO: 85 %
NRBC # BLD AUTO: 0 10E3/UL
NRBC BLD AUTO-RTO: 0 /100
PLATELET # BLD AUTO: 260 10E3/UL (ref 150–450)
PLATELET # BLD AUTO: 281 10E3/UL (ref 150–450)
RBC # BLD AUTO: 2.88 10E6/UL (ref 4.4–5.9)
RBC # BLD AUTO: 3.05 10E6/UL (ref 4.4–5.9)
WBC # BLD AUTO: 13.4 10E3/UL (ref 4–11)
WBC # BLD AUTO: 14.9 10E3/UL (ref 4–11)

## 2021-07-11 PROCEDURE — 99232 SBSQ HOSP IP/OBS MODERATE 35: CPT | Performed by: INTERNAL MEDICINE

## 2021-07-11 PROCEDURE — 250N000009 HC RX 250: Performed by: INTERNAL MEDICINE

## 2021-07-11 PROCEDURE — 36415 COLL VENOUS BLD VENIPUNCTURE: CPT | Performed by: HOSPITALIST

## 2021-07-11 PROCEDURE — 258N000003 HC RX IP 258 OP 636

## 2021-07-11 PROCEDURE — 999N000009 HC STATISTIC AIRWAY CARE

## 2021-07-11 PROCEDURE — 36415 COLL VENOUS BLD VENIPUNCTURE: CPT | Performed by: INTERNAL MEDICINE

## 2021-07-11 PROCEDURE — 250N000011 HC RX IP 250 OP 636

## 2021-07-11 PROCEDURE — 85025 COMPLETE CBC W/AUTO DIFF WBC: CPT | Performed by: INTERNAL MEDICINE

## 2021-07-11 PROCEDURE — 250N000009 HC RX 250

## 2021-07-11 PROCEDURE — 85027 COMPLETE CBC AUTOMATED: CPT | Performed by: HOSPITALIST

## 2021-07-11 PROCEDURE — 999N000157 HC STATISTIC RCP TIME EA 10 MIN

## 2021-07-11 PROCEDURE — 250N000013 HC RX MED GY IP 250 OP 250 PS 637

## 2021-07-11 PROCEDURE — 94640 AIRWAY INHALATION TREATMENT: CPT | Mod: 76

## 2021-07-11 PROCEDURE — 120N000017 HC R&B RESPIRATORY CARE

## 2021-07-11 PROCEDURE — 83605 ASSAY OF LACTIC ACID: CPT | Performed by: INTERNAL MEDICINE

## 2021-07-11 PROCEDURE — 94664 DEMO&/EVAL PT USE INHALER: CPT

## 2021-07-11 PROCEDURE — 97535 SELF CARE MNGMENT TRAINING: CPT | Mod: GO | Performed by: OCCUPATIONAL THERAPIST

## 2021-07-11 PROCEDURE — 94003 VENT MGMT INPAT SUBQ DAY: CPT

## 2021-07-11 RX ORDER — IPRATROPIUM BROMIDE AND ALBUTEROL SULFATE 2.5; .5 MG/3ML; MG/3ML
3 SOLUTION RESPIRATORY (INHALATION) EVERY 6 HOURS
Status: DISCONTINUED | OUTPATIENT
Start: 2021-07-11 | End: 2021-07-30

## 2021-07-11 RX ORDER — SODIUM CHLORIDE FOR INHALATION 3 %
3 VIAL, NEBULIZER (ML) INHALATION 2 TIMES DAILY
Status: DISCONTINUED | OUTPATIENT
Start: 2021-07-11 | End: 2021-07-20

## 2021-07-11 RX ADMIN — NYSTATIN 500000 UNITS: 100000 SUSPENSION ORAL at 17:54

## 2021-07-11 RX ADMIN — ANORECTAL OINTMENT: 15.7; .44; 24; 20.6 OINTMENT TOPICAL at 08:34

## 2021-07-11 RX ADMIN — NYSTATIN 500000 UNITS: 100000 SUSPENSION ORAL at 21:09

## 2021-07-11 RX ADMIN — CEFTAZIDIME, AVIBACTAM 2.5 G: 2; .5 POWDER, FOR SOLUTION INTRAVENOUS at 17:54

## 2021-07-11 RX ADMIN — METRONIDAZOLE 500 MG: 500 INJECTION, SOLUTION INTRAVENOUS at 21:08

## 2021-07-11 RX ADMIN — NYSTATIN 500000 UNITS: 100000 SUSPENSION ORAL at 08:35

## 2021-07-11 RX ADMIN — HEPARIN SODIUM 5000 UNITS: 5000 INJECTION, SOLUTION INTRAVENOUS; SUBCUTANEOUS at 14:39

## 2021-07-11 RX ADMIN — FAMOTIDINE 20 MG: 20 TABLET, FILM COATED ORAL at 21:08

## 2021-07-11 RX ADMIN — Medication 1 PACKET: at 14:39

## 2021-07-11 RX ADMIN — LISINOPRIL 20 MG: 20 TABLET ORAL at 18:01

## 2021-07-11 RX ADMIN — IPRATROPIUM BROMIDE AND ALBUTEROL SULFATE 3 ML: .5; 3 SOLUTION RESPIRATORY (INHALATION) at 08:27

## 2021-07-11 RX ADMIN — IPRATROPIUM BROMIDE AND ALBUTEROL SULFATE 3 ML: .5; 3 SOLUTION RESPIRATORY (INHALATION) at 19:19

## 2021-07-11 RX ADMIN — METHYLPHENIDATE HYDROCHLORIDE 10 MG: 10 TABLET ORAL at 12:47

## 2021-07-11 RX ADMIN — SODIUM CHLORIDE 30 MG/ML INHALATION SOLUTION 3 ML: 30 SOLUTION INHALANT at 08:30

## 2021-07-11 RX ADMIN — NYSTATIN 500000 UNITS: 100000 SUSPENSION ORAL at 12:47

## 2021-07-11 RX ADMIN — Medication 1 PACKET: at 08:35

## 2021-07-11 RX ADMIN — IPRATROPIUM BROMIDE AND ALBUTEROL SULFATE 3 ML: .5; 3 SOLUTION RESPIRATORY (INHALATION) at 13:50

## 2021-07-11 RX ADMIN — ANORECTAL OINTMENT: 15.7; .44; 24; 20.6 OINTMENT TOPICAL at 21:09

## 2021-07-11 RX ADMIN — CEFTAZIDIME, AVIBACTAM 2.5 G: 2; .5 POWDER, FOR SOLUTION INTRAVENOUS at 11:01

## 2021-07-11 RX ADMIN — METRONIDAZOLE 500 MG: 500 INJECTION, SOLUTION INTRAVENOUS at 08:34

## 2021-07-11 RX ADMIN — SODIUM CHLORIDE: 900 INJECTION INTRAVENOUS at 11:02

## 2021-07-11 RX ADMIN — METHYLPHENIDATE HYDROCHLORIDE 10 MG: 10 TABLET ORAL at 08:43

## 2021-07-11 RX ADMIN — SODIUM CHLORIDE 30 MG/ML INHALATION SOLUTION 3 ML: 30 SOLUTION INHALANT at 19:19

## 2021-07-11 RX ADMIN — ANORECTAL OINTMENT: 15.7; .44; 24; 20.6 OINTMENT TOPICAL at 14:38

## 2021-07-11 RX ADMIN — HEPARIN SODIUM 5000 UNITS: 5000 INJECTION, SOLUTION INTRAVENOUS; SUBCUTANEOUS at 21:08

## 2021-07-11 RX ADMIN — HEPARIN SODIUM 5000 UNITS: 5000 INJECTION, SOLUTION INTRAVENOUS; SUBCUTANEOUS at 06:50

## 2021-07-11 RX ADMIN — FAMOTIDINE 20 MG: 20 TABLET, FILM COATED ORAL at 08:35

## 2021-07-11 RX ADMIN — Medication 1 PACKET: at 21:09

## 2021-07-11 ASSESSMENT — MIFFLIN-ST. JEOR
SCORE: 1438.51
SCORE: 1436.88

## 2021-07-11 NOTE — PROGRESS NOTES
Progress Notes by Myranda Sánchez MD at 7/11/2021  1:01 AM     Author: Myranda Sánchez MD Service: Hospitalist Author Type: Physician    Filed: 7/11/2021 11:56 AM Date of Service: 7/11/2021  1:01 AM Status: Signed    : Myranda Sánchez MD (Physician)          [facility] Hospitalist Daily Progress Note   Brief summary:  71 yo M with h/o HTN who presented to ED on 5/15/2021 with acute SAH after a fall with unresponsiveness.  He underwent emergent left external ventricular drain placement.  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  On 5/28/2021, patient had persistent cerebral vasospasm, CT head showed bilateral BAYLEE infarct and was treated with milrinone and verapamil.  On 6/1/2021, angiogram showed no evidence of vasospasm.  Extubated on 6/3/2021.  6/6/2021 he was reintubated due to hypoxia.  On 6/7/2021, patient underwent tracheostomy and PEG tube placement.  EVD removed 6/9/2021.  Unasyn for possible pneumonia on 5/20/2021 which was switched to ceftriaxone the next day for sputum culture growing Klebsiella.  On 5/23, sputum culture also grew Pseudomonas so switched to Zosyn.  Due to increased WBCs in CSF possibility of ventriculitis was raised however CSF culture was negative but antibiotic was switched to cefepime and vancomycin on 5/28/2021 for 2 weeks.  Vanco was discontinued on 6/9/2021.  On 6/5/2021, sputum grew ESBL so cefepime was switched to meropenem.  He was transferred to [facility] on 6/11/21. Repeat sputum culture 6/19 showed MDR pseudomonas and he was started on tobramycin nebs from 6/24/2021 - 7/9/2021 . He has persistent tracheal secretions.     6/14/21 CT head done for fatigue- read as slight increase in caliber of lateral and third ventricle with possibility of developing communicating hydrocephalus. Discussed with Dr. Casillas (neurosurgeon at [facility]), who did not think there was much change,  so repeat CT head on 6/16/21 was done which did not show any change.     RT and pulmonary are following and pt is on phase 2 weaning with TM/PMV during day and full vent at night. Vent weaning slowed due to poor cough and secretions.Mental status is slowly improving. He is tolerating tube feeding.    7/8/2021 aspirated tube feeds.  Spiked a fever that evening 102.8 and WBC went up to 36.9 the next morning so ID reconsulted and has been started on ceftazadime-avibactam along with flagyl 7/9/2021. WBC improving.     He has severe debility and malnourishment and is progressing slowly and appears malnourishment, weakness is major factor in slow progression and at risk for set backs.    Assessment/Plan    Principal Problem:    SAH (subarachnoid hemorrhage) (H)  Active Problems:    Acute respiratory failure with hypoxia (H)    ESBL (extended spectrum beta-lactamase) producing bacteria infection    Attention to tracheostomy (H)    Acute and chronic respiratory failure with hypoxia (H)    On mechanically assisted ventilation (H)    Encounter for palliative care    Encephalopathy    Metabolic encephalopathy    Idiopathic hypertension    Oropharyngeal dysphagia    Moderate protein-calorie malnutrition (H)    Pseudomonas aeruginosa infection    Acute tracheobronchitis    Cerebrovascular accident (CVA) due to occlusion of cerebral artery (H)    Aspiration pneumonia due to gastric secretions, unspecified laterality, unspecified part of lung (H)      Sepsis due to aspiration pneumonia  -Pt spiked fever with tachycardia on evening of 7/8  -Likely aspirated feeding formula as tube feeds suctioned from trach  -CXR early 7/9 didn't show any signs of aspiration but may be too early to see changes. 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   Continue pulmonary toilet, scheduled nebs     Recent subarachnoid hemorrhage  Presented with fall and unresponsiveness on 5/15/2021.  S/p EVD, angiogram showing ruptured posterior communicating aneurysm followed by craniotomy with clipping of PCOM aneurysm on the same day.  EVD removed on 6/9/2021.   Postoperative period was complicated by cerebral vasospasm, bilateral BAYLEE infarct. MRI done on 5/28 showed right frontal parietal, temopral and left parasagittal parietal lobes infarcts.   On 6/1/2021, angiogram showed no vasospasm.  Will need to follow up with Dr. Martinez in Neurosurgery clinic in 3 months with repeat CTA of head and neck for post-operative follow up     Metabolic encephalopathy: multifactorial  On restraints  CT head 7/1 no new changes  Per discussion with neurology (7/1/21) and NSG (7/7/21), pt started on trial of low dose ritalin.  Doesn't seem to help much, dose increased to 10mg in AM and noon on 7/9/2021 - plan for 7 days - if no improvement then would discontinue per neurology.    Anemia - likely due to anemia of chronic disease at this point, but did drop from yesterday 1 gm so will need to follow and watch for signs of bleeding.    Hypertension-stable  On lisinopril     Oropharyngeal dysphagia  On tube feeding via PEG tube, dietitian, speech following.      Inflamed possibly sebaceous cyst, left inguinal area,   Keflex discontinued - now on Abx as above per ID.  Needs outpatient Surgery evaluation for removal - can follow up for both inguinal hernia  and cyst as outpatient    Moderate protein-calorie malnutrition  On tube feeding  RD following     Left inguinal hernia- Monitoring  Needs outpatient evaluation     Severe debility, weakness, critical illness, deconditioning, Continue PT/OT     DVT prophylaxis: Heparin sq  GI prophylaxis: Pepcid     Subjective:   Chart reviewed and events noted. Patient seen and examined at bedside. He is less interactive and does not answer questions.      ROS  Limited, please see above.     Objective:  Vital signs    Temp:  [98.1  F (36.7  C)-100.1  F (37.8  C)] 98.1  F (36.7  C)  Heart Rate:  [] 96  Resp:  [17-33] 17  BP: (117-147)/(64-89) 147/78  FiO2 (%):  [35 %-40 %] 35 %    Weight    Scheduled Meds:  ? ceftazidime-avibactam (AVYCAZ) IVPB  2.5 g Intravenous Q8H   ? famotidine  20 mg Enteral Tube BID   ? guar gum  1 packet Enteral Tube TID   ? heparin (PF) ANTICOAGULANT  5,000 Units Subcutaneous Q8H FIXED TIMES   ? ipratropium-albuteroL  3 mL Nebulization Q6H - RT   ? lisinopriL  20 mg Enteral Tube Daily   ? menthol-zinc oxide   Topical TID   ? methylphenidate HCl  10 mg Oral 2 times per day   ? metroNIDAZOLE  500 mg Intravenous Q12H   ? nystatin  5 mL Swish & Swallow QID   ? sodium chloride  3 mL Nebulization BID   ? sodium chloride  3 mL Intravenous Line Care     Continuous Infusions:   PRN Meds:.acetaminophen **OR** acetaminophen, alteplase, bisacodyL, dextrose 50 % (D50W), docusate sodium, glucagon (human recombinant), hydrALAZINE, loperamide, magnesium hydroxide, naloxone **OR** naloxone **OR** naloxone **OR** naloxone, ondansetron, polyethylene glycol, sodium chloride 0.9%, sodium chloride bacteriostatic, sodium chloride  Intake/Output    I/O last 3 completed shifts:  In: 1813 [I.V.:3; NG/GT:1575; IV Piggyback:235]  Out: 625 [Urine:625]     Physical Exam:       General Appearance:Chronically ill appearing , NAD  HEENT- NC  Neck- trach+   Lungs: Diminished BS  Cardiovascular: RRR  Abdomen: Soft, non  distended  Extremities:  no edema  Neurologic: Awake, follows a few simple commands, sticks out tongue, move RUE, wriggles toes       Imaging: I have independently reviewed reports.     Lab Results: I have independently reviewed lab results.     All medication issues identified within the last 24 hours have been addressed and completed as appropriate.     Barriers to disposition:  Vent and trach weaning, sepsis work up  Expected discharge- multiple days expected     Discussed with nursing staff     Myranda Sánchez MD  Hospitalist

## 2021-07-11 NOTE — PLAN OF CARE
Problem: OT General Care Plan  Goal: Cognitive (OT)  Description: Cognitive (OT)  Outcome: No Change     Problem: Skin and Tissue Injury (Mechanical Ventilation, Invasive)  Goal: Absence of Device-Related Skin and Tissue Injury  Outcome: Improved  Pt was resting in bed most of the shift but awake all the time. Family here and attempted to keep him awake. Attended therapies. BPA fired up and lab done.

## 2021-07-11 NOTE — PROGRESS NOTES
Sepsis protocol fired as the Pt. HR and RR was high. Protocol followed, lactic acid lab was done and with in normal range. Vital signs were stable later. Pt. is still on restraints for safety. Last  Three fingers on both hands were red because of friction.continue to monitor. Head to toe assessment done and charted around midnight before epic downtime.  Margot Sargent RN

## 2021-07-11 NOTE — PLAN OF CARE
Plan of Care by Amanda Jean RN at 7/10/2021 10:18 PM     Author: Amanda Jean RN Service: -- Author Type: Registered Nurse    Filed: 7/10/2021 10:20 PM Date of Service: 7/10/2021 10:18 PM Status: Signed    : Amanda Jean RN (Registered Nurse)         Problem: Daily Care  Goal: Daily care needs are met  Outcome: Progressing   Patient was calm, cooperative with cares and repositions. Patient remained afebrile this shift. IV antibiotics continued. No sign of pain noted, patient denied pain.

## 2021-07-11 NOTE — PROGRESS NOTES
RT PROGRESS NOTE     DATA:     CURRENT SETTINGS:             TRACH TYPE / SIZE:  #7 [place]             MODE:   PRVC/AC 14/450             FIO2:   35     ACTION:             THERAPIES:   Duo neb Q6/ NACL BID             SUCTION:                           FREQUENCY:   X6                        AMOUNT:   Moderate x4, Large x2                        CONSISTENCY:   Thick                        COLOR:   Pale yellow             SPONTANEOUS COUGH EFFORT/STRENGTH OF EFFORT (not elicited by suctioning): Strong productive cought                              WEANING PHASE:   Phase 2                        WEAN MODE:    40%, 30L HFTM for 13hr 45min.                         WEAN TIME:   2102                        END WEAN REASON:   Full vent at noc per order     RESPONSE:             BS:   Coarse             VITAL SIGNS:   O2 Sats %, HR , RR19-33              EMOTIONAL NEEDS / CONCERNS:                  RISK FOR SELF DECANNULATION:                          RISK DUE TO:                          INTERVENTION/S IN PLACE IS/ARE:         NOTE / PLAN:   Patient continues to remain on full vent and tolerating well with no distress. Continue current care plan.    DT Note added on behalf of Timoteo Tran, RT   98

## 2021-07-11 NOTE — PLAN OF CARE
Short-term goals to be met by 7/15/21:  Frequency: 3/wk  Patient will:  1) tolerate speaking valve x15 minutes with min cues for adequate voicing  2) participate in bedside swallow study when consistently tolerating speaking valve on trach mask  3) comprehend spoken information at short sentence level (1-step commands and yes/no questions) with 85% accuracy given moderate cues  4) communicate basic needs/wants via multiple modalities x3 during session      Goals initiated on 6/12/2021 by David Coffman MA, CCC-SLP and updated on 7/5 by Katalina Joseph MA, CCC-SLP.  Long-term goals to be met by 7/30/21:  Patient will tolerate safest, least restrictive diet and improve communication skills for effective interpersonal interactions

## 2021-07-11 NOTE — PLAN OF CARE
Problem: OT General Care Plan  Goal: Hygiene/Grooming (OT)  Description: Hygiene/Grooming (OT)  Flowsheets (Taken 7/11/2021 1254)  OT goal: hygiene/grooming:   minimal assist   from wheelchair  Goal: Upper Body Dressing (OT)  Description: Upper Body Dressing (OT)  Flowsheets (Taken 7/11/2021 1254)  OT goal: upper body dressing: (caryn technique as appropriate) Minimal assist  Goal: Bed Mobility (OT)  Description: Bed Mobility (OT)  Flowsheets (Taken 7/11/2021 1254)  OT goal: bed mobility: Maximum assist  Goal: Transfer (OT)  Description: Transfer (OT)  Flowsheets (Taken 7/11/2021 1254)  OT Goal: transfer: Moderate assist  Goal: OT Goal 1  Description: OT Goal 1  Flowsheets (Taken 7/11/2021 1254)  OT goal 1: Tolerate sitting EOB w/ Max A for 8 minutes.  Goal: OT Goal 2  Description: OT Goal 2  Flowsheets (Taken 7/11/2021 1254)  OT goal 2: Participate in further visual assmt in order to assist with safe d/c planning.  Goal: OT Goal 3  Description: OT Goal 3  Flowsheets (Taken 7/11/2021 1254)  OT goal 3: Participate in 15 minues of UE ex/activity to increase activity tolerance for daily tasks/transfers.  Goal: OT Goal 4  Description: OT Goal 4  Flowsheets (Taken 7/11/2021 1254)  OT goal 4: Attend to therapy tasks for 15 minutes with min A/verbal cues for ease of ADL completion.     Goals entered on behalf of initial eval from original EHR.   Dahinaa Connelly OTR/L

## 2021-07-11 NOTE — PROGRESS NOTES
[facility] Hospitalist Daily Progress Note   Brief summary:  69 yo M with h/o HTN who presented to ED on 5/15/2021 with acute SAH after a fall with unresponsiveness.  He underwent emergent left external ventricular drain placement.  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  On 5/28/2021, patient had persistent cerebral vasospasm, CT head showed bilateral BAYLEE infarct and was treated with milrinone and verapamil.  On 6/1/2021, angiogram showed no evidence of vasospasm.  Extubated on 6/3/2021.  6/6/2021 he was reintubated due to hypoxia.  On 6/7/2021, patient underwent tracheostomy and PEG tube placement.  EVD removed 6/9/2021.  Unasyn for possible pneumonia on 5/20/2021 which was switched to ceftriaxone the next day for sputum culture growing Klebsiella.  On 5/23, sputum culture also grew Pseudomonas so switched to Zosyn.  Due to increased WBCs in CSF possibility of ventriculitis was raised however CSF culture was negative but antibiotic was switched to cefepime and vancomycin on 5/28/2021 for 2 weeks.  Vanco was discontinued on 6/9/2021.  On 6/5/2021, sputum grew ESBL so cefepime was switched to meropenem.  He was transferred to [facility] on 6/11/21. Repeat sputum culture 6/19 showed MDR pseudomonas and he was started on tobramycin nebs from 6/24/2021 - 7/9/2021 . He has persistent tracheal secretions.     6/14/21 CT head done for fatigue- read as slight increase in caliber of lateral and third ventricle with possibility of developing communicating hydrocephalus. Discussed with Dr. Casillas (neurosurgeon at [facility]), who did not think there was much change, so repeat CT head on 6/16/21 was done which did not show any change.     RT and pulmonary are following and pt is on phase 2 weaning with TM/PMV during day and full vent at night. Vent weaning slowed due to poor cough and secretions.Mental status is slowly improving. He is  tolerating tube feeding.     7/8/2021 aspirated tube feeds.  Spiked a fever that evening 102.8 and WBC went up to 36.9 the next morning so ID reconsulted and has been started on ceftazadime-avibactam along with flagyl 7/9/2021. WBC improving.     He has severe debility and malnourishment and is progressing slowly and appears malnourishment, weakness is major factor in slow progression and at risk for set backs.     Assessment/Plan     Principal Problem:    SAH (subarachnoid hemorrhage) (H)  Active Problems:    Acute respiratory failure with hypoxia (H)    ESBL (extended spectrum beta-lactamase) producing bacteria infection    Attention to tracheostomy (H)    Acute and chronic respiratory failure with hypoxia (H)    On mechanically assisted ventilation (H)    Encounter for palliative care    Encephalopathy    Metabolic encephalopathy    Idiopathic hypertension    Oropharyngeal dysphagia    Moderate protein-calorie malnutrition (H)    Pseudomonas aeruginosa infection    Acute tracheobronchitis    Cerebrovascular accident (CVA) due to occlusion of cerebral artery (H)    Aspiration pneumonia due to gastric secretions, unspecified laterality, unspecified part of lung (H)        Sepsis due to aspiration pneumonia  -Pt spiked fever with tachycardia on evening of 7/8  -Likely aspirated feeding formula as tube feeds suctioned from trach  -CXR early 7/9 didn't show any signs of aspiration but may be too early to see changes. 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   Continue pulmonary toilet, scheduled nebs     Recent subarachnoid hemorrhage  Presented with fall and unresponsiveness on 5/15/2021.  S/p EVD, angiogram showing ruptured posterior communicating aneurysm followed by craniotomy with clipping of PCOM aneurysm on the same day.  EVD removed on 6/9/2021.   Postoperative period was complicated by cerebral vasospasm, bilateral BAYLEE infarct. MRI done on 5/28 showed right frontal parietal, temopral and left parasagittal parietal lobes infarcts.   On 6/1/2021, angiogram showed no vasospasm.  Will need to follow up with Dr. Martinez in Neurosurgery clinic in 3 months with repeat CTA of head and neck for post-operative follow up     Metabolic encephalopathy: multifactorial  On restraints  CT head 7/1 no new changes  Per discussion with neurology (7/1/21) and NSG (7/7/21), pt started on trial of low dose ritalin.  Doesn't seem to help much, dose increased to 10mg in AM and noon on 7/9/2021 - plan for 7 days - if no improvement then would discontinue per neurology.     Anemia - likely due to anemia of chronic disease at this point, but did drop from yesterday 1 gm so will need to follow and watch for signs of bleeding.     Hypertension-stable  On lisinopril     Oropharyngeal dysphagia  On tube feeding via PEG tube, dietitian, speech following.      Inflamed possibly sebaceous cyst, left inguinal area,   Keflex discontinued - now on Abx as above per ID.  Needs outpatient Surgery evaluation for removal - can follow up for both inguinal hernia and cyst as outpatient     Moderate protein-calorie malnutrition  On tube feeding  RD following     Left inguinal hernia- Monitoring  Needs outpatient evaluation     Severe debility, weakness, critical illness, deconditioning, Continue PT/OT     DVT  prophylaxis: Heparin sq  GI prophylaxis: Pepcid      Subjective:   Chart reviewed and events noted. Patient seen and examined at bedside. He is less interactive and does not answer questions.        ROS  Limited, please see above.     Objective:  Vital signs     Temp:  [98.1  F (36.7  C)-100.1  F (37.8  C)] 98.1  F (36.7  C)  Heart Rate:  [] 96  Resp:  [17-33] 17  BP: (117-147)/(64-89) 147/78  FiO2 (%):  [35 %-40 %] 35 %     Weight     Scheduled Meds:    ceftazidime-avibactam (AVYCAZ) IVPB  2.5 g Intravenous Q8H     famotidine  20 mg Enteral Tube BID     guar gum  1 packet Enteral Tube TID     heparin (PF) ANTICOAGULANT  5,000 Units Subcutaneous Q8H FIXED TIMES     ipratropium-albuteroL  3 mL Nebulization Q6H - RT     lisinopriL  20 mg Enteral Tube Daily     menthol-zinc oxide   Topical TID     methylphenidate HCl  10 mg Oral 2 times per day     metroNIDAZOLE  500 mg Intravenous Q12H     nystatin  5 mL Swish & Swallow QID     sodium chloride  3 mL Nebulization BID     sodium chloride  3 mL Intravenous Line Care      Continuous Infusions:   PRN Meds:.acetaminophen **OR** acetaminophen, alteplase, bisacodyL, dextrose 50 % (D50W), docusate sodium, glucagon (human recombinant), hydrALAZINE, loperamide, magnesium hydroxide, naloxone **OR** naloxone **OR** naloxone **OR** naloxone, ondansetron, polyethylene glycol, sodium chloride 0.9%, sodium chloride bacteriostatic, sodium chloride  Intake/Output     I/O last 3 completed shifts:  In: 1813 [I.V.:3; NG/GT:1575; IV Piggyback:235]  Out: 625 [Urine:625]     Physical Exam:        General Appearance:Chronically ill appearing , NAD  HEENT- NC  Neck- trach+   Lungs: Diminished BS  Cardiovascular: RRR  Abdomen: Soft, non distended  Extremities:  no edema  Neurologic: Awake, follows a few simple commands, sticks out tongue, move RUE, wriggles toes        Imaging: I have independently reviewed reports.     Lab Results: I have independently reviewed lab results.     All  medication issues identified within the last 24 hours have been addressed and completed as appropriate.     Barriers to disposition:  Vent and trach weaning, sepsis work up  Expected discharge- multiple days expected     Discussed with nursing staff     Myranda Sánchez MD  Hospitalist

## 2021-07-12 ENCOUNTER — APPOINTMENT (OUTPATIENT)
Dept: PHYSICAL THERAPY | Facility: CLINIC | Age: 71
DRG: 207 | End: 2021-07-12
Attending: HOSPITALIST
Payer: COMMERCIAL

## 2021-07-12 ENCOUNTER — APPOINTMENT (OUTPATIENT)
Dept: OCCUPATIONAL THERAPY | Facility: CLINIC | Age: 71
DRG: 207 | End: 2021-07-12
Attending: HOSPITALIST
Payer: COMMERCIAL

## 2021-07-12 LAB
ANION GAP SERPL CALCULATED.3IONS-SCNC: 10 MMOL/L (ref 5–18)
BACTERIA SPEC CULT: ABNORMAL
BUN SERPL-MCNC: 24 MG/DL (ref 8–22)
CALCIUM SERPL-MCNC: 10.5 MG/DL (ref 8.5–10.5)
CHLORIDE BLD-SCNC: 107 MMOL/L (ref 98–107)
CO2 SERPL-SCNC: 31 MMOL/L (ref 22–31)
CREAT SERPL-MCNC: 0.57 MG/DL (ref 0.7–1.3)
ERYTHROCYTE [DISTWIDTH] IN BLOOD BY AUTOMATED COUNT: 14.1 % (ref 10–15)
GFR SERPL CREATININE-BSD FRML MDRD: >90 ML/MIN/1.73M2
GLUCOSE BLD-MCNC: 132 MG/DL (ref 70–125)
GRAM STAIN RESULT: ABNORMAL
GRAM STAIN RESULT: ABNORMAL
HCT VFR BLD AUTO: 31 % (ref 40–53)
HGB BLD-MCNC: 9.6 G/DL (ref 13.3–17.7)
LACTATE SERPL-SCNC: 0.9 MMOL/L (ref 0.7–2)
MCH RBC QN AUTO: 30.6 PG (ref 26.5–33)
MCHC RBC AUTO-ENTMCNC: 31 G/DL (ref 31.5–36.5)
MCV RBC AUTO: 99 FL (ref 78–100)
PLATELET # BLD AUTO: 284 10E3/UL (ref 150–450)
POTASSIUM BLD-SCNC: 4.3 MMOL/L (ref 3.5–5)
RBC # BLD AUTO: 3.14 10E6/UL (ref 4.4–5.9)
SARS-COV-2 RNA RESP QL NAA+PROBE: NEGATIVE
SODIUM SERPL-SCNC: 148 MMOL/L (ref 136–145)
WBC # BLD AUTO: 10.3 10E3/UL (ref 4–11)

## 2021-07-12 PROCEDURE — 85018 HEMOGLOBIN: CPT | Performed by: HOSPITALIST

## 2021-07-12 PROCEDURE — 250N000013 HC RX MED GY IP 250 OP 250 PS 637

## 2021-07-12 PROCEDURE — 36415 COLL VENOUS BLD VENIPUNCTURE: CPT | Performed by: HOSPITALIST

## 2021-07-12 PROCEDURE — 94640 AIRWAY INHALATION TREATMENT: CPT | Mod: 76

## 2021-07-12 PROCEDURE — 97530 THERAPEUTIC ACTIVITIES: CPT | Mod: GP | Performed by: PHYSICAL THERAPIST

## 2021-07-12 PROCEDURE — 83605 ASSAY OF LACTIC ACID: CPT | Performed by: HOSPITALIST

## 2021-07-12 PROCEDURE — 94003 VENT MGMT INPAT SUBQ DAY: CPT

## 2021-07-12 PROCEDURE — 99233 SBSQ HOSP IP/OBS HIGH 50: CPT | Performed by: HOSPITALIST

## 2021-07-12 PROCEDURE — 94003 VENT MGMT INPAT SUBQ DAY: CPT | Performed by: INTERNAL MEDICINE

## 2021-07-12 PROCEDURE — 87635 SARS-COV-2 COVID-19 AMP PRB: CPT | Performed by: HOSPITALIST

## 2021-07-12 PROCEDURE — 999N000009 HC STATISTIC AIRWAY CARE

## 2021-07-12 PROCEDURE — 94640 AIRWAY INHALATION TREATMENT: CPT

## 2021-07-12 PROCEDURE — 99207 PR NO CHARGE LOS: CPT | Performed by: INTERNAL MEDICINE

## 2021-07-12 PROCEDURE — 97530 THERAPEUTIC ACTIVITIES: CPT | Mod: GO | Performed by: OCCUPATIONAL THERAPIST

## 2021-07-12 PROCEDURE — 80048 BASIC METABOLIC PNL TOTAL CA: CPT | Performed by: HOSPITALIST

## 2021-07-12 PROCEDURE — 97112 NEUROMUSCULAR REEDUCATION: CPT | Mod: GP | Performed by: PHYSICAL THERAPIST

## 2021-07-12 PROCEDURE — 999N000157 HC STATISTIC RCP TIME EA 10 MIN

## 2021-07-12 PROCEDURE — 250N000009 HC RX 250: Performed by: INTERNAL MEDICINE

## 2021-07-12 PROCEDURE — 250N000011 HC RX IP 250 OP 636

## 2021-07-12 PROCEDURE — 120N000017 HC R&B RESPIRATORY CARE

## 2021-07-12 RX ADMIN — Medication 1 PACKET: at 14:21

## 2021-07-12 RX ADMIN — IPRATROPIUM BROMIDE AND ALBUTEROL SULFATE 3 ML: .5; 3 SOLUTION RESPIRATORY (INHALATION) at 20:41

## 2021-07-12 RX ADMIN — CEFTAZIDIME, AVIBACTAM 2.5 G: 2; .5 POWDER, FOR SOLUTION INTRAVENOUS at 02:11

## 2021-07-12 RX ADMIN — NYSTATIN 500000 UNITS: 100000 SUSPENSION ORAL at 12:18

## 2021-07-12 RX ADMIN — HEPARIN SODIUM 5000 UNITS: 5000 INJECTION, SOLUTION INTRAVENOUS; SUBCUTANEOUS at 21:14

## 2021-07-12 RX ADMIN — NYSTATIN 500000 UNITS: 100000 SUSPENSION ORAL at 18:00

## 2021-07-12 RX ADMIN — SODIUM CHLORIDE 30 MG/ML INHALATION SOLUTION 3 ML: 30 SOLUTION INHALANT at 20:42

## 2021-07-12 RX ADMIN — LISINOPRIL 20 MG: 20 TABLET ORAL at 18:01

## 2021-07-12 RX ADMIN — CEFTAZIDIME, AVIBACTAM 2.5 G: 2; .5 POWDER, FOR SOLUTION INTRAVENOUS at 18:00

## 2021-07-12 RX ADMIN — CEFTAZIDIME, AVIBACTAM 2.5 G: 2; .5 POWDER, FOR SOLUTION INTRAVENOUS at 12:19

## 2021-07-12 RX ADMIN — IPRATROPIUM BROMIDE AND ALBUTEROL SULFATE 3 ML: .5; 3 SOLUTION RESPIRATORY (INHALATION) at 07:50

## 2021-07-12 RX ADMIN — FAMOTIDINE 20 MG: 20 TABLET, FILM COATED ORAL at 21:14

## 2021-07-12 RX ADMIN — IPRATROPIUM BROMIDE AND ALBUTEROL SULFATE 3 ML: .5; 3 SOLUTION RESPIRATORY (INHALATION) at 02:27

## 2021-07-12 RX ADMIN — SODIUM CHLORIDE 30 MG/ML INHALATION SOLUTION 3 ML: 30 SOLUTION INHALANT at 08:05

## 2021-07-12 RX ADMIN — Medication 1 PACKET: at 09:00

## 2021-07-12 RX ADMIN — IPRATROPIUM BROMIDE AND ALBUTEROL SULFATE 3 ML: .5; 3 SOLUTION RESPIRATORY (INHALATION) at 13:43

## 2021-07-12 RX ADMIN — FAMOTIDINE 20 MG: 20 TABLET, FILM COATED ORAL at 08:57

## 2021-07-12 RX ADMIN — METHYLPHENIDATE HYDROCHLORIDE 10 MG: 10 TABLET ORAL at 08:56

## 2021-07-12 RX ADMIN — ANORECTAL OINTMENT: 15.7; .44; 24; 20.6 OINTMENT TOPICAL at 08:58

## 2021-07-12 RX ADMIN — HEPARIN SODIUM 5000 UNITS: 5000 INJECTION, SOLUTION INTRAVENOUS; SUBCUTANEOUS at 14:21

## 2021-07-12 RX ADMIN — NYSTATIN 500000 UNITS: 100000 SUSPENSION ORAL at 08:56

## 2021-07-12 RX ADMIN — Medication 1 PACKET: at 22:49

## 2021-07-12 RX ADMIN — METRONIDAZOLE 500 MG: 500 INJECTION, SOLUTION INTRAVENOUS at 09:20

## 2021-07-12 RX ADMIN — HEPARIN SODIUM 5000 UNITS: 5000 INJECTION, SOLUTION INTRAVENOUS; SUBCUTANEOUS at 05:28

## 2021-07-12 RX ADMIN — METRONIDAZOLE 500 MG: 500 INJECTION, SOLUTION INTRAVENOUS at 21:14

## 2021-07-12 RX ADMIN — ANORECTAL OINTMENT: 15.7; .44; 24; 20.6 OINTMENT TOPICAL at 21:15

## 2021-07-12 RX ADMIN — ANORECTAL OINTMENT: 15.7; .44; 24; 20.6 OINTMENT TOPICAL at 14:22

## 2021-07-12 RX ADMIN — METHYLPHENIDATE HYDROCHLORIDE 10 MG: 10 TABLET ORAL at 12:18

## 2021-07-12 RX ADMIN — NYSTATIN 500000 UNITS: 100000 SUSPENSION ORAL at 21:14

## 2021-07-12 ASSESSMENT — MIFFLIN-ST. JEOR: SCORE: 1440.51

## 2021-07-12 NOTE — PLAN OF CARE
Plan of Care by Jacqueline Vieyra RN at 2021  9:23 AM     Author: Jacqueline Vieyra RN Service: -- Author Type: RN, Care Manager    Filed: 2021  9:33 AM Date of Service: 2021  9:23 AM Status: Signed    : Jacqueline Vieyra RN (RN, Care Manager)       Care Management Progression of Care Update        DR GLOS - Target D/C Date TBD        PLAN/GOALS  1. Pulmonary following. Phase 2 wean~Trach hi rosemarie 35% Fi02 days. Vent at night settings PRVC/AC  Frequent suctioning 8x/shift. Duo-neb six times a day for pulmonary hygiene.    2.  Nutrition management.  Tube feeding via PEG placed 21.  Registered dietician to monitor tube feeding tolerance, weight and labs.    3.  Neurology following intermittently.    4. PT/OT 5x/week.     5.  Speech therapy 3x/week. Participate in bedside swallow study when consistently tolerating speaking valve on trach mask    6.  Palliative following.           BARRIERS  1.  Acute hypoxic respiratory failure due to subarachnoid hemorrhage.  Vent / trach / wean.    2.  Acute metabolic encephalopathy.    3.  Oropharyngeal dysphagia.    4.   Bilateral mitts for safety.        Disposition: TCU vs. Acute Rehab  Care Manager Name:  Jacqueline Vieyra RN,BSN, HNB-BC    Date/Time:  21 9:24 AM

## 2021-07-12 NOTE — PROGRESS NOTES
CLINICAL NUTRITION SERVICES - REASSESSMENT NOTE     Nutrition Prescription    RECOMMENDATIONS FOR MDs/PROVIDERS TO ORDER:  Check Na level tomorrow, 7/13/21.  Stop NS since FWF will be increased.    Malnutrition Status:    Moderate    Recommendations already ordered by Registered Dietitian (RD):  TF: Osmolite 1.5 at 90 mL/hour with 3 packets Benefiber per day.   FWF of at least 15-30 mL before and after medication admin and to unclog FT also ordered, though not by RD.    Future/Additional Recommendations:  None     NEW FINDINGS   Na is 148 today.    INTERVENTIONS  Implementation  Feeding tube flush-change to 240 mL q 4 hours to help correct Na.    Monitoring/Evaluation  Progress toward goals will be monitored and evaluated per protocol.

## 2021-07-12 NOTE — PROGRESS NOTES
[facility]    Infectious Disease Progress Note    Date of Service (chart reviewed, partial video visit): 07/12/2021     Assessment & Plan   Dayron Neri is a 70 year old male who was admitted on 6/11/2021.   Please see previous note by Dr. Edwards     1. Fever, leukocytosis. Most likely from aspiration event 7/8, noted to cough up tube feeds. Chemical pneumonitis with likely infection. WBC 36k 7/9. Due to history of MDR pseudomonas will broaden coverage -- ceftolozane-tazobactam if available, plus flagyl for anaerobes.   2. 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 Has been on tobramycin nebs since 6/24 -- this may suppress pseudomonas from contributing to current infection.   3. 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 Found to have BAYLEE infarction.   4. Treated for possible ventriculitis with 2 weeks broad spectrum antibiotics based on WBC in CSF in setting of blood in CSF. WBC/RBC ratios may not correlate well even in absence of infection due to blood being irritant to meninges and causing inflammation.   5. H/o ESBL klebsiella in sputum  Principal Problem:    SAH (subarachnoid hemorrhage) (H)  Active Problems:    Acute respiratory failure with hypoxia (H)    ESBL (extended spectrum beta-lactamase) producing bacteria infection    Attention to tracheostomy (H)    Acute and chronic respiratory failure with hypoxia (H)    On mechanically assisted ventilation (H)    Encounter for palliative care    Encephalopathy    Metabolic encephalopathy    Idiopathic hypertension    Oropharyngeal dysphagia    Moderate protein-calorie malnutrition (H)    Pseudomonas aeruginosa infection    Acute tracheobronchitis    Cerebrovascular accident (CVA)  due to occlusion of cerebral artery    RECOMMENDATIONS:    ceftazidime/avibactam 2.5g q8h. plus flagyl  Respiratory culture through endotracheal aspirate  Follow respiratory and blood cultures. Can adjust/focus  antibiotics based on cultures and clinical response. If he remains febrile,  Or does not improve clinically, add IV Vancomycin.   Total duration of antibiotics: 7 days and reassess.  Please call with questions. ID will chart check/follow peripherally.      Dorene Kim MD  [facility]  [phone]    Video visit  Visit/Communication Style   Visit/Communication Style   Virtual (Video) communication was used to evaluate Dayron.  Unable to connect provider video/audio using [employer]. Was able to see the patiet  Video START time: 12:56, 7/12/2021  Video STOP time: 12:57, 7/12/2021   Patient's location: [facility]   Provider's location during the visit: [facility]    Total Time Spent 20 minutes with >50% of the time spent in chart review, attempting to connect with patient. Patient new to me. Please see previous note by Dr. Edwards              Sputum culture  [facility] Epic:   Specimen Information: Respiratory         0 Result Notes    Culture 2+ Klebsiella oxytoca               Gram Stain Result 3+ Polymorphonuclear leukocytes      No organisms seen              Resulting Agency: [facility]     Susceptibility     Klebsiella oxytoca     SUSAN     Ampicillin Resistant     Ampicillin + Sulbactam Intermediate     Cefazolin Resistant     Cefepime Sensitive     Ceftriaxone Sensitive     Ciprofloxacin Sensitive     Gentamicin Sensitive     Levofloxacin Sensitive     Meropenem Sensitive     Piperacillin + Tazobactam Sensitive     Tetracycline Sensitive     Tobramycin Sensitive     Trimethoprim + Sulfamethoxazole Sensitive                 Specimen Collected: 07/09/21 16:11 Last Resulted: 07/12/21 11:47               Interval History   Chart reviewed  Patient trached,  Sitting up in bed    Physical Exam   Temp: 98  F (36.7  C) Temp src: Oral BP: (!) 142/88 Pulse: 85   Resp: 24 SpO2: 96 % O2 Device: Trach dome Oxygen Delivery: 30 LPM  Vitals:    07/11/21 0646 07/11/21  0827 07/12/21 0602   Weight: 64.6 kg (142 lb 8 oz) 64.5 kg (142 lb 3.2 oz) 64.9 kg (143 lb)     Vital Signs with Ranges  Temp:  [98  F (36.7  C)-98.8  F (37.1  C)] 98  F (36.7  C)  Pulse:  [78-94] 85  Resp:  [19-27] 24  BP: (126-177)/(69-98) 142/88  FiO2 (%):  [30 %-35 %] 30 %  SpO2:  [96 %-100 %] 96 %    Constitutional: Awake,fatigued, Video visit  Resp: trach    Medications     sodium chloride 50 mL/hr at 07/11/21 1102       cefTAZidime-avibactam  2.5 g Intravenous Q8H     famotidine  20 mg Oral or Feeding Tube BID     fiber modular (NUTRISOURCE FIBER)  1 packet Per Feeding Tube TID     heparin ANTICOAGULANT  5,000 Units Subcutaneous Q8H     ipratropium - albuterol 0.5 mg/2.5 mg/3 mL  3 mL Nebulization Q6H     lisinopril  20 mg Oral or Feeding Tube Daily     menthol-zinc oxide   Topical TID     methylphenidate  10 mg Oral or Feeding Tube BID     metroNIDAZOLE  500 mg Intravenous Q12H     nystatin  500,000 Units Mouth/Throat 4x Daily     sodium chloride  3 mL Nebulization BID     sodium chloride (PF)  3 mL Intracatheter Q8H       Data   All microbiology laboratory data reviewed.  Recent Labs   Lab Test 07/11/21  1415 07/11/21  0539 06/11/21  0341   WBC 13.4* 14.9* 8.2   HGB 9.1* 9.5* 8.2*   HCT 28.4* 30.3* 26.8*   MCV 99 99 100    281 397     Recent Labs   Lab Test 07/12/21  0637 06/11/21  0341 06/10/21  0356   CR 0.57* 0.57* 0.52*     Recent Labs   Lab Test 05/25/21  0335   *     MICROBIOLOGY:    Sputum culture          0 Result Notes    Culture 2+ Klebsiella oxytoca               Gram Stain Result 3+ Polymorphonuclear leukocytes      No organisms seen              Resulting Agency: [facility]     Susceptibility     Klebsiella oxytoca     SUSAN     Ampicillin Resistant     Ampicillin + Sulbactam Intermediate     Cefazolin Resistant     Cefepime Sensitive     Ceftriaxone Sensitive     Ciprofloxacin Sensitive     Gentamicin Sensitive     Levofloxacin Sensitive     Meropenem Sensitive     Piperacillin +  Tazobactam Sensitive     Tetracycline Sensitive     Tobramycin Sensitive     Trimethoprim + Sulfamethoxazole Sensitive                 Specimen Collected: 07/09/21 16:11 Last Resulted: 07/12/21 11:47               Recent Labs   Lab Test 06/08/21  1641 06/01/21  1140 06/01/21  1137 06/01/21  1136 06/01/21  1130 05/30/21  1000 05/29/21  1141 05/28/21  1614 05/28/21  0638   CULT Moderate growth  Normal he    Light growth  Pseudomonas aeruginosa  * No growth Heavy growth  Staphylococcus epidermidis  Susceptibility testing not routinely done  *  Moderate growth  Pseudomonas aeruginosa  Susceptibility testing done on previous specimen  *  Light growth  Klebsiella oxytoca ESBL  ESBL (extended beta lactamase) producing organisms require contact precautions.  *  Critical Value/Significant Value called to and read back by  Roman Higthower RN at 2482 6/4/21 hg   No anaerobes isolated  No growth No growth No anaerobes isolated  No growth No anaerobes isolated  No growth No anaerobes isolated  No growth No growth

## 2021-07-12 NOTE — PROGRESS NOTES
[facility] Pulmonary Medicine - Progress Note  7/12/2021    Assessment:  Principal Problem:    SAH (subarachnoid hemorrhage) (H)  Active Problems:    Acute respiratory failure with hypoxia (H)    ESBL (extended spectrum beta-lactamase) producing bacteria infection    Attention to tracheostomy (H)    Acute and chronic respiratory failure with hypoxia (H)    On mechanically assisted ventilation (H)    Encounter for palliative care    Encephalopathy    70 y.o. 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 Course c/b bilateral BAYLEE infarct with vasospasm. Extubated on 6/3/2021 and re-intubated on 6/6/2021 due to hypoxemia. On 6/7/2021, patient underwent tracheostomy and PEG tube placement. Currently being treated for PsA VAP.    Recommendations/Plans:    Continue phase 2 wean as tolerated    Ceftazidime/avibactam + metronidazole per ID    Bronchial hygiene w/ DuoNeb + HS nebs    Trach: 7 bivona, placed 6/7/21, most recently changed 7/05    SLP following, appreciate recommendations -- as far as I can tell, it appears that PMV trials were interrupted last week due to emesis & concern for secretions. Would not be able to deflate the cuff consistently while his secretions are so heavy in setting of an acute infection    Discussed my impressions and recommendations for care with RT.    Nia Bains MD  Pulmonary and Critical Care      -------------    Subjective  No acute events overnight. Remains on TM. Recurrent frequent secretions. Multidisciplinary notes reviewed.    I was able to update both wife & daughter on the ipad in the room.    Current FiO2 (%): 35 % Current PEEP: +5    Tracheal secretions: frequent, moderate to large    Current phase of ventilator weaning pathway:  Phase 2  Ventilator weaning results from yesterday: TM all day, vent at  night    Physical Exam:  Temp:  [98.1  F (36.7  C)-99.3  F (37.4  C)] 99.3  F (37.4  C)  Heart Rate:  [74-94] 83  Resp:  [18-28] 28  BP: (117-139)/(67-82) 117/67  FiO2 (%):  [24 %-30 %] 30 %    GEN: NAD, resting in bed  HEENT: MMM, trach in place  CVS: regular rhythm, no murmurs  RESP: clear in anterior lung fields, rhonchi improved over the last 24 hours  ABD: Soft, No abdominal pain with palpation   SKIN: Warm extremities, no visible rashes  EXT: warm, no edema  NEURO:  Grossly intact    Pertinent Labs: Lab Results: personally reviewed.     Pertinent Radiology: personally reviewed, including Radiology interpretations (as below):  CXR, 7/09:  Tracheostomy tube projects over tracheal air column. Increased discoid atelectasis at the left lung base. No other new focal consolidation. No pneumothorax or pleural effusion. Cardiac silhouette within normal limits. Stable tortuous aorta.

## 2021-07-12 NOTE — PLAN OF CARE
Problem: Adult Inpatient Plan of Care  Goal: Optimal Comfort and Wellbeing  Outcome: Improving   Patient was calm, tolerated cares and repositions well. Patient denied pain.

## 2021-07-12 NOTE — PLAN OF CARE
Problem: Adult Inpatient Plan of Care  Goal: Plan of Care Review  Outcome: No Change     Problem: Anxiety  Goal: Anxiety Reduction or Resolution  Outcome: No Change     Problem: Restraint for Non-Violent/Non-Self-Destructive Behavior  Goal: Prevent/Manage Potential Problems  Description: Maintain safety of patient and others during period of restraint.  Promote psychological and physical wellbeing.  Prevent injury to skin and involved body parts.  Promote nutrition, hydration, and elimination.  7/12/2021 1843 by Johanna Humphrey RN  Outcome: No Change  7/12/2021 1841 by Johanna Humphrey RN  Outcome: No Change     Problem: Anxiety  Goal: Anxiety Reduction or Resolution  Outcome: No Change     Problem: Restraint for Non-Violent/Non-Self-Destructive Behavior  Goal: Prevent/Manage Potential Problems  Description: Maintain safety of patient and others during period of restraint.  Promote psychological and physical wellbeing.  Prevent injury to skin and involved body parts.  Promote nutrition, hydration, and elimination.  7/12/2021 1843 by Johanna Humphrey RN  Outcome: No Change  7/12/2021 1841 by Johanna Humphrey RN  Outcome: No Change   Patient is alert and following some commands. Try to mouth words to communicate.Still noted touching trach and G tube.  Started on bilateral mittens. Hands and fingers are red.Family requested to use the mittens with mesh but not available for now.Restraints is shift to bilateral soft limb while waiting for the mesh mittens.  Sepsis BPA fired.Lactic Acid is WDL.Vital signs are WDL.

## 2021-07-12 NOTE — PLAN OF CARE
Problem: PT General Care Plan  Goal: PT Frequency  Flowsheets (Taken 7/12/2021 0853)  PT Frequency: 6x/week  Goal: Bed Mobility (PT)  Description: PT Bed Mobility  Flowsheets (Taken 7/12/2021 0853)  PT goal: bed mobility: Moderate assist  Goal: Transfer (PT)  Description: PT Transfer  Flowsheets (Taken 7/12/2021 0853)  PT goal: transfers:   Moderate assist   Assistive device  Goal: Gait (PT)  Description: PT Gait  Flowsheets (Taken 7/12/2021 0853)  PT goal: gait:   10 feet   Assistive device   Moderate assist  Goal: PT Goal 1  Description: PT Goal 1  Flowsheets (Taken 7/12/2021 0854)  PT goal 1: Pt will perform all sitting balance with ADRIANA Merchant, PT

## 2021-07-12 NOTE — PLAN OF CARE
Problem: Anxiety  Goal: Anxiety Reduction or Resolution  7/12/2021 0618 by Елена Rai, RN  Outcome: Improving  7/12/2021 0618 by Елена Rai, RN  Outcome: No Change     Problem: Restraint for Non-Violent/Non-Self-Destructive Behavior  Goal: Prevent/Manage Potential Problems  Description: Maintain safety of patient and others during period of restraint.  Promote psychological and physical wellbeing.  Prevent injury to skin and involved body parts.  Promote nutrition, hydration, and elimination.  Outcome: Improving   Patient noted rubbing hand mitt over his trach/vent tubing.  Patient redirected., continues with mitts x 2.  Redness, blanchable noted over fingers and knuckles on both hands.   Restraint released with cares q 2 hours. No s/s of pain noted.  Patient calm with cares; slept well in between cares; resting, appears comfortable at this time.

## 2021-07-12 NOTE — PLAN OF CARE
Problem: Communication Impairment (Mechanical Ventilation, Invasive)  Goal: Effective Communication  Outcome: Improving     Problem: Device-Related Complication Risk (Mechanical Ventilation, Invasive)  Goal: Optimal Device Function  Outcome: Improving  Intervention: Optimize Device Care and Function  Recent Flowsheet Documentation  Taken 7/12/2021 0227 by Kayla Small, RT  Airway Safety Measures:   all equipment/monitors on and audible   mask at bedside   manual resuscitator at bedside   manual resuscitator/mask/valve in room   suction at bedside   suction regulator   suction equipment   oxygen flowmeter  Taken 7/11/2021 1919 by Kayla Smlal, RT  Airway Safety Measures:   all equipment/monitors on and audible   manual resuscitator/mask/valve in room   suction at bedside   suction regulator   suction equipment   oxygen flowmeter     Problem: Inability to Wean (Mechanical Ventilation, Invasive)  Goal: Mechanical Ventilation Liberation  Outcome: Improving     Problem: Skin and Tissue Injury (Mechanical Ventilation, Invasive)  Goal: Absence of Device-Related Skin and Tissue Injury  Outcome: Improving     Problem: Ventilator-Induced Lung Injury (Mechanical Ventilation, Invasive)  Goal: Absence of Ventilator-Induced Lung Injury  Outcome: Improving

## 2021-07-12 NOTE — PLAN OF CARE
PHYSICAL THERAPY    Patient will demonstrate the following by 7-30-21, in order to maximize independence with functional mobility to facilitate safe discharge:  -Supine<>sit with head of bed flat, with  rail, mod assist  -Sit<>stand with FWW assistive device, Mod assist of one  -Sit on EOB with SBA  -Ambulate 10 feet with FWW assistive device, Mod assist of one      Goals entered on 6/14/2021 by Dayron Page, PT  Goals reviewed and revised on 7/1/2021 by Negin Merchant, PT

## 2021-07-12 NOTE — PROGRESS NOTES
[facility] Hospitalist Daily Progress Note   Brief summary:  71 yo M with h/o HTN who presented to ED on 5/15/2021 with acute SAH after a fall with unresponsiveness.  He underwent emergent left external ventricular drain placement.  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  On 5/28/2021, patient had persistent cerebral vasospasm, CT head showed bilateral BAYLEE infarct and was treated with milrinone and verapamil.  On 6/1/2021, angiogram showed no evidence of vasospasm.  Extubated on 6/3/2021.  6/6/2021 he was reintubated due to hypoxia.  On 6/7/2021, patient underwent tracheostomy and PEG tube placement.  EVD removed 6/9/2021.  Unasyn for possible pneumonia on 5/20/2021 which was switched to ceftriaxone the next day for sputum culture growing Klebsiella.  On 5/23, sputum culture also grew Pseudomonas so switched to Zosyn.  Due to increased WBCs in CSF possibility of ventriculitis was raised however CSF culture was negative but antibiotic was switched to cefepime and vancomycin on 5/28/2021 for 2 weeks.  Vanco was discontinued on 6/9/2021.  On 6/5/2021, sputum grew ESBL so cefepime was switched to meropenem.  He was transferred to [facility] on 6/11/21. Repeat sputum culture 6/19 showed MDR pseudomonas and he was started on tobramycin nebs from 6/24/2021 - 7/9/2021 . He has persistent tracheal secretions.     6/14/21 CT head done for fatigue- read as slight increase in caliber of lateral and third ventricle with possibility of developing communicating hydrocephalus. Discussed with Dr. Casillas (neurosurgeon at [facility]), who did not think there was much change, so repeat CT head on 6/16/21 was done which did not show any change.       7/8/2021 aspirated tube feeds.  Spiked a fever that evening 102.8 and WBC went up to 36.9 the next morning so ID reconsulted and has been started on ceftazadime-avibactam along with flagyl on 7/9.       RT and pulmonary are following and pt is on phase 2 weaning with TM/PMV during day and full vent at night. Vent weaning slowed due to poor cough and secretions.Mental status is slowly improving.Tolerating tube feeds    He has severe debility and malnourishment and is progressing slowly and appears malnourishment, weakness is major factor in slow progression and at risk for set backs.     Assessment/Plan     Principal Problem:    SAH (subarachnoid hemorrhage) (H)  Active Problems:    Acute respiratory failure with hypoxia (H)    ESBL (extended spectrum beta-lactamase) producing bacteria infection    Attention to tracheostomy (H)    Acute and chronic respiratory failure with hypoxia (H)    On mechanically assisted ventilation (H)    Encounter for palliative care    Encephalopathy    Metabolic encephalopathy    Idiopathic hypertension    Oropharyngeal dysphagia    Moderate protein-calorie malnutrition (H)    Pseudomonas aeruginosa infection    Acute tracheobronchitis    Cerebrovascular accident (CVA) due to occlusion of cerebral artery (H)    Aspiration pneumonia due to gastric secretions, unspecified laterality, unspecified part of lung (H)        Sepsis due to aspiration pneumonia  -Pt spiked fever with tachycardia on evening of 7/8  -Likely aspirated feeding formula as tube feeds suctioned from trach  -CXR early 7/9 didn't show any signs of aspiration but may be too early to see changes.  -Procalcitonin mildly elevated, blood cx pending. Respiratory cx shows PML.   -Abd xray shows percutaneous gastrostomy, no tube internally  -ceftazidime-avibactam with flagyl started 7/9/2021 per ID  - WBC improving.    Hypernatremia   -D/w RD, likely will need from free water flushes.  -Recheck Na tomorrow.     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   Continue pulmonary toilet, scheduled nebs     Recent subarachnoid hemorrhage  Presented with fall and unresponsiveness on 5/15/2021.  S/p EVD, angiogram showing ruptured posterior communicating aneurysm followed by craniotomy with clipping of PCOM aneurysm on the same day.  EVD removed on 6/9/2021.   Postoperative period was complicated by cerebral vasospasm, bilateral BAYLEE infarct. MRI done on 5/28 showed right frontal parietal, temopral and left parasagittal parietal lobes infarcts.   On 6/1/2021, angiogram showed no vasospasm.  Will need to follow up with Dr. Martinez in Neurosurgery clinic in 3 months with repeat CTA of head and neck for post-operative follow up     Metabolic encephalopathy: multifactorial  On restraints  CT head 7/1 no new changes  Per discussion with neurology (7/1/21) and NSG (7/7/21), pt started on trial of low dose ritalin, on 10mg AM and noon which seems to help.      Anemia - likely due to anemia of chronic disease at this point,  Hb stable around 8-9.     Hypertension-stable  On lisinopril, hydralazine prn     Oropharyngeal dysphagia  On tube feeding via PEG tube, dietitian, speech following.      Inflamed possibly sebaceous cyst, left inguinal area,   Keflex discontinued - now on Abx as above per ID.  Needs outpatient Surgery evaluation for removal - can follow up for both inguinal hernia and cyst as outpatient     Moderate protein-calorie malnutrition  On tube feeding  RD following     Left inguinal hernia- Monitoring  Needs outpatient evaluation     Severe debility, weakness, critical illness, deconditioning, Continue PT/OT     DVT prophylaxis: Heparin sq  GI prophylaxis: Pepcid      Subjective:   Patient seen and examined. Sitting in chair, states some pain in hands. Worked with therapy and feeling tired.  Wife and daughter present over the ipad.        ROS  Limited, please see above.     Objective:  Vital signs     Temp:  [98.1  F (36.7  C)-100.1  F (37.8  C)] 98.1  F (36.7  C)  Heart Rate:  [] 96  Resp:  [17-33] 17  BP: (117-147)/(64-89) 147/78  FiO2 (%):  [35 %-40 %] 35 %     Weight     Scheduled Meds:    ceftazidime-avibactam (AVYCAZ) IVPB  2.5 g Intravenous Q8H     famotidine  20 mg Enteral Tube BID     guar gum  1 packet Enteral Tube TID     heparin (PF) ANTICOAGULANT  5,000 Units Subcutaneous Q8H FIXED TIMES     ipratropium-albuteroL  3 mL Nebulization Q6H - RT     lisinopriL  20 mg Enteral Tube Daily     menthol-zinc oxide   Topical TID     methylphenidate HCl  10 mg Oral 2 times per day     metroNIDAZOLE  500 mg Intravenous Q12H     nystatin  5 mL Swish & Swallow QID     sodium chloride  3 mL Nebulization BID     sodium chloride  3 mL Intravenous Line Care      Continuous Infusions:   PRN Meds:.acetaminophen **OR** acetaminophen, alteplase, bisacodyL, dextrose 50 % (D50W), docusate sodium, glucagon (human recombinant), hydrALAZINE, loperamide, magnesium hydroxide, naloxone **OR** naloxone **OR** naloxone **OR** naloxone, ondansetron, polyethylene glycol, sodium chloride 0.9%, sodium chloride bacteriostatic, sodium chloride  Intake/Output     I/O last 3 completed shifts:  In: 1813 [I.V.:3; NG/GT:1575; IV Piggyback:235]  Out: 625 [Urine:625]     Physical Exam:        General Appearance:Chronically ill appearing , NAD  HEENT- NC  Neck- trach+   Lungs: Diminished BS  Cardiovascular: RRR  Abdomen: Soft, non distended  Extremities:  no edema  Neurologic: Awake, alert.        Imaging: I have independently reviewed reports.     Lab Results: I have independently reviewed lab results.     All medication issues identified within the last 24 hours have been addressed and completed as appropriate.     Barriers to disposition:  Vent and trach weaning, sepsis work up  Expected discharge- Several days.     Discussed  with RN, RD,SW and patients family over the ipad in the room     Kim Knox MD  Hospitalist

## 2021-07-12 NOTE — PROGRESS NOTES
Social Work Note:    Writer received call from patient's spouse Spouse expressed many concerns about patient care and providers of patient care.  She shared concerns about patient bedside care, spoke about RT/ST/RN cares.    Will pass her concerns along to charge RN and unit managers.     Dayorn Chen, [facility]  [phone]

## 2021-07-12 NOTE — PLAN OF CARE
Problem: Mechanical Ventilation  Goal: Patient will maintain patent airway  Outcome: Progressing  Goal: Tracheostomy will be managed safely  Outcome: Progressing  Goal: Respiratory status - ventilation  Description: Movement of air in and out of the lungs.    Liberate from ventilator  Outcome: Progressing  RT PROGRESS NOTE     DATA:     CURRENT SETTINGS:               TRACH TYPE / SIZE: #7 Bivona, placed 7/5/21             MODE:  HF 30L TM, cuff up   FIO2:  30%     ACTION:             THERAPIES: Duo neb x 2 Q6, Saline neb BID given on scheduled time             SUCTION:                           FREQUENCY: 4                        AMOUNT: mod x 2/ large                        CONSISTENCY: normal to thick                        COLOR:   white x 3/ pale yellow             SPONTANEOUS COUGH EFFORT/STRENGTH OF EFFORT (not elicited by suctioning): not observed                              WEANING PHASE:   2                        WEAN MODE: 30% HF 30L TM, cuff up                        WEAN TIME: TM cont since 0826                        END WEAN REASON:      RESPONSE:             BS:   coarse on R side, slightly coarse on L side - clear decreased after Sx              VITAL SIGNS: O2 sat 95-99%, HR 84-86, RR 22 on TM wean, RR 25-26 on PRVC/ AC vent             EMOTIONAL NEEDS / CONCERNS:  None               RISK FOR SELF DECANNULATION:  Yes                        RISK DUE TO: Confusion                        INTERVENTION/S IN PLACE IS/ARE: Restraints x2       NOTE / PLAN:  cont current plan of care - TM during the day with cuff inflated, lot of Sx due to infection. Vent at night with settings: PRVC/ AC 14, 450, +5, 35%.

## 2021-07-13 ENCOUNTER — APPOINTMENT (OUTPATIENT)
Dept: SPEECH THERAPY | Facility: CLINIC | Age: 71
DRG: 207 | End: 2021-07-13
Attending: HOSPITALIST
Payer: COMMERCIAL

## 2021-07-13 ENCOUNTER — APPOINTMENT (OUTPATIENT)
Dept: OCCUPATIONAL THERAPY | Facility: CLINIC | Age: 71
DRG: 207 | End: 2021-07-13
Attending: HOSPITALIST
Payer: COMMERCIAL

## 2021-07-13 ENCOUNTER — APPOINTMENT (OUTPATIENT)
Dept: PHYSICAL THERAPY | Facility: CLINIC | Age: 71
DRG: 207 | End: 2021-07-13
Attending: HOSPITALIST
Payer: COMMERCIAL

## 2021-07-13 LAB
ANION GAP SERPL CALCULATED.3IONS-SCNC: 13 MMOL/L (ref 5–18)
BASOPHILS # BLD AUTO: 0 10E3/UL (ref 0–0.2)
BASOPHILS NFR BLD AUTO: 1 %
BUN SERPL-MCNC: 23 MG/DL (ref 8–22)
CALCIUM SERPL-MCNC: 10.7 MG/DL (ref 8.5–10.5)
CHLORIDE BLD-SCNC: 105 MMOL/L (ref 98–107)
CO2 SERPL-SCNC: 27 MMOL/L (ref 22–31)
CREAT SERPL-MCNC: 0.54 MG/DL (ref 0.7–1.3)
EOSINOPHIL # BLD AUTO: 0.3 10E3/UL (ref 0–0.7)
EOSINOPHIL NFR BLD AUTO: 3 %
ERYTHROCYTE [DISTWIDTH] IN BLOOD BY AUTOMATED COUNT: 14.1 % (ref 10–15)
GFR SERPL CREATININE-BSD FRML MDRD: >90 ML/MIN/1.73M2
GLUCOSE BLD-MCNC: 133 MG/DL (ref 70–125)
HCT VFR BLD AUTO: 31 % (ref 40–53)
HGB BLD-MCNC: 9.7 G/DL (ref 13.3–17.7)
IMM GRANULOCYTES # BLD: 0.1 10E3/UL
IMM GRANULOCYTES NFR BLD: 1 %
LYMPHOCYTES # BLD AUTO: 1.3 10E3/UL (ref 0.8–5.3)
LYMPHOCYTES NFR BLD AUTO: 15 %
MCH RBC QN AUTO: 30.9 PG (ref 26.5–33)
MCHC RBC AUTO-ENTMCNC: 31.3 G/DL (ref 31.5–36.5)
MCV RBC AUTO: 99 FL (ref 78–100)
MONOCYTES # BLD AUTO: 0.6 10E3/UL (ref 0–1.3)
MONOCYTES NFR BLD AUTO: 7 %
NEUTROPHILS # BLD AUTO: 6.1 10E3/UL (ref 1.6–8.3)
NEUTROPHILS NFR BLD AUTO: 73 %
NRBC # BLD AUTO: 0 10E3/UL
NRBC BLD AUTO-RTO: 0 /100
PLATELET # BLD AUTO: 285 10E3/UL (ref 150–450)
POTASSIUM BLD-SCNC: 4.4 MMOL/L (ref 3.5–5)
RBC # BLD AUTO: 3.14 10E6/UL (ref 4.4–5.9)
SODIUM SERPL-SCNC: 145 MMOL/L (ref 136–145)
SODIUM SERPL-SCNC: 146 MMOL/L (ref 136–145)
WBC # BLD AUTO: 8.4 10E3/UL (ref 4–11)

## 2021-07-13 PROCEDURE — 92507 TX SP LANG VOICE COMM INDIV: CPT | Mod: GN

## 2021-07-13 PROCEDURE — 94003 VENT MGMT INPAT SUBQ DAY: CPT

## 2021-07-13 PROCEDURE — 85025 COMPLETE CBC W/AUTO DIFF WBC: CPT | Performed by: INTERNAL MEDICINE

## 2021-07-13 PROCEDURE — 36415 COLL VENOUS BLD VENIPUNCTURE: CPT | Performed by: HOSPITALIST

## 2021-07-13 PROCEDURE — 250N000011 HC RX IP 250 OP 636

## 2021-07-13 PROCEDURE — 84295 ASSAY OF SERUM SODIUM: CPT | Performed by: HOSPITALIST

## 2021-07-13 PROCEDURE — 92526 ORAL FUNCTION THERAPY: CPT | Mod: GN

## 2021-07-13 PROCEDURE — 999N000123 HC STATISTIC OXYGEN O2DAILY TECH TIME

## 2021-07-13 PROCEDURE — 94003 VENT MGMT INPAT SUBQ DAY: CPT | Performed by: INTERNAL MEDICINE

## 2021-07-13 PROCEDURE — 94640 AIRWAY INHALATION TREATMENT: CPT | Mod: 76

## 2021-07-13 PROCEDURE — 99233 SBSQ HOSP IP/OBS HIGH 50: CPT | Performed by: HOSPITALIST

## 2021-07-13 PROCEDURE — 120N000018 HC R&B RESPIRATORY CARE WITH ATTENDANT

## 2021-07-13 PROCEDURE — 97530 THERAPEUTIC ACTIVITIES: CPT | Mod: GO | Performed by: OCCUPATIONAL THERAPIST

## 2021-07-13 PROCEDURE — 94640 AIRWAY INHALATION TREATMENT: CPT

## 2021-07-13 PROCEDURE — 97535 SELF CARE MNGMENT TRAINING: CPT | Mod: GO | Performed by: OCCUPATIONAL THERAPIST

## 2021-07-13 PROCEDURE — 250N000013 HC RX MED GY IP 250 OP 250 PS 637

## 2021-07-13 PROCEDURE — 97530 THERAPEUTIC ACTIVITIES: CPT | Mod: GP | Performed by: PHYSICAL THERAPIST

## 2021-07-13 PROCEDURE — 999N000009 HC STATISTIC AIRWAY CARE

## 2021-07-13 PROCEDURE — 999N000157 HC STATISTIC RCP TIME EA 10 MIN

## 2021-07-13 PROCEDURE — 94761 N-INVAS EAR/PLS OXIMETRY MLT: CPT

## 2021-07-13 PROCEDURE — 80048 BASIC METABOLIC PNL TOTAL CA: CPT | Performed by: HOSPITALIST

## 2021-07-13 PROCEDURE — 250N000009 HC RX 250: Performed by: INTERNAL MEDICINE

## 2021-07-13 RX ORDER — SODIUM CHLORIDE 9 MG/ML
INJECTION, SOLUTION INTRAVENOUS CONTINUOUS PRN
Status: DISCONTINUED | OUTPATIENT
Start: 2021-07-13 | End: 2021-09-27 | Stop reason: HOSPADM

## 2021-07-13 RX ADMIN — Medication 1 PACKET: at 14:44

## 2021-07-13 RX ADMIN — METHYLPHENIDATE HYDROCHLORIDE 10 MG: 10 TABLET ORAL at 11:31

## 2021-07-13 RX ADMIN — Medication 1 PACKET: at 09:21

## 2021-07-13 RX ADMIN — CEFTAZIDIME, AVIBACTAM 2.5 G: 2; .5 POWDER, FOR SOLUTION INTRAVENOUS at 11:08

## 2021-07-13 RX ADMIN — NYSTATIN 500000 UNITS: 100000 SUSPENSION ORAL at 09:11

## 2021-07-13 RX ADMIN — ANORECTAL OINTMENT: 15.7; .44; 24; 20.6 OINTMENT TOPICAL at 21:25

## 2021-07-13 RX ADMIN — ANORECTAL OINTMENT: 15.7; .44; 24; 20.6 OINTMENT TOPICAL at 14:51

## 2021-07-13 RX ADMIN — NYSTATIN 500000 UNITS: 100000 SUSPENSION ORAL at 21:23

## 2021-07-13 RX ADMIN — ANORECTAL OINTMENT: 15.7; .44; 24; 20.6 OINTMENT TOPICAL at 09:12

## 2021-07-13 RX ADMIN — HEPARIN SODIUM 5000 UNITS: 5000 INJECTION, SOLUTION INTRAVENOUS; SUBCUTANEOUS at 05:43

## 2021-07-13 RX ADMIN — FAMOTIDINE 20 MG: 20 TABLET, FILM COATED ORAL at 09:11

## 2021-07-13 RX ADMIN — FAMOTIDINE 20 MG: 20 TABLET, FILM COATED ORAL at 21:23

## 2021-07-13 RX ADMIN — SODIUM CHLORIDE 30 MG/ML INHALATION SOLUTION 3 ML: 30 SOLUTION INHALANT at 07:30

## 2021-07-13 RX ADMIN — CEFTAZIDIME, AVIBACTAM 2.5 G: 2; .5 POWDER, FOR SOLUTION INTRAVENOUS at 18:34

## 2021-07-13 RX ADMIN — IPRATROPIUM BROMIDE AND ALBUTEROL SULFATE 3 ML: .5; 3 SOLUTION RESPIRATORY (INHALATION) at 20:24

## 2021-07-13 RX ADMIN — METRONIDAZOLE 500 MG: 500 INJECTION, SOLUTION INTRAVENOUS at 21:22

## 2021-07-13 RX ADMIN — Medication 1 PACKET: at 21:24

## 2021-07-13 RX ADMIN — CEFTAZIDIME, AVIBACTAM 2.5 G: 2; .5 POWDER, FOR SOLUTION INTRAVENOUS at 03:15

## 2021-07-13 RX ADMIN — SODIUM CHLORIDE 30 MG/ML INHALATION SOLUTION 3 ML: 30 SOLUTION INHALANT at 20:24

## 2021-07-13 RX ADMIN — METRONIDAZOLE 500 MG: 500 INJECTION, SOLUTION INTRAVENOUS at 09:11

## 2021-07-13 RX ADMIN — NYSTATIN 500000 UNITS: 100000 SUSPENSION ORAL at 14:43

## 2021-07-13 RX ADMIN — IPRATROPIUM BROMIDE AND ALBUTEROL SULFATE 3 ML: .5; 3 SOLUTION RESPIRATORY (INHALATION) at 14:51

## 2021-07-13 RX ADMIN — LISINOPRIL 20 MG: 20 TABLET ORAL at 18:30

## 2021-07-13 RX ADMIN — IPRATROPIUM BROMIDE AND ALBUTEROL SULFATE 3 ML: .5; 3 SOLUTION RESPIRATORY (INHALATION) at 02:49

## 2021-07-13 RX ADMIN — IPRATROPIUM BROMIDE AND ALBUTEROL SULFATE 3 ML: .5; 3 SOLUTION RESPIRATORY (INHALATION) at 07:28

## 2021-07-13 RX ADMIN — HEPARIN SODIUM 5000 UNITS: 5000 INJECTION, SOLUTION INTRAVENOUS; SUBCUTANEOUS at 14:44

## 2021-07-13 RX ADMIN — NYSTATIN 500000 UNITS: 100000 SUSPENSION ORAL at 18:29

## 2021-07-13 RX ADMIN — HEPARIN SODIUM 5000 UNITS: 5000 INJECTION, SOLUTION INTRAVENOUS; SUBCUTANEOUS at 21:23

## 2021-07-13 RX ADMIN — METHYLPHENIDATE HYDROCHLORIDE 10 MG: 10 TABLET ORAL at 09:12

## 2021-07-13 NOTE — PLAN OF CARE
Problem: Mechanical Ventilation  Goal: Patient will maintain patent airway  Outcome: Progressing  Goal: Tracheostomy will be managed safely  Outcome: Progressing  Goal: Respiratory status - ventilation  Description: Movement of air in and out of the lungs.    Liberate from ventilator  Outcome: Progressing   RT PROGRESS NOTE     DATA:     CURRENT SETTINGS:  14/450/+5             TRACH TYPE / SIZE: #7 Bivona (Placed 07/05)             MODE:  AC             FIO2:   35%     ACTION:             THERAPIES: Duo-neb Q6/Saline BID             SUCTION:  Yes                         FREQUENCY: 4x                        AMOUNT:   Moderate x3 to Large x1                        CONSISTENCY: Thick                        COLOR:  Pale yellow/White             SPONTANEOUS COUGH EFFORT/STRENGTH OF EFFORT (not elicited by suctioning): Yes, strong productive cough.                              WEANING PHASE:   2                        WEAN MODE: HFTM                        WEAN TIME:  12 hours and 16 minutes                        END WEAN REASON: Full vent at noc     RESPONSE:             BS:   Coarse             VITAL SIGNS: Sat %, HR 81-85, RR 18-28             EMOTIONAL NEEDS / CONCERNS:  None                RISK FOR SELF DECANNULATION:  Yes                        RISK DUE TO: Confusion                        INTERVENTION/S IN PLACE IS/ARE:  Mitts x2       NOTE / PLAN:     Pt remains on full vent and tolerating well with no distress.  Continue current care plan.

## 2021-07-13 NOTE — PROGRESS NOTES
[facility] Pulmonary Medicine - Progress Note  7/13/2021     Assessment:  Principal Problem:    SAH (subarachnoid hemorrhage) (H)  Active Problems:    Acute respiratory failure with hypoxia (H)    ESBL (extended spectrum beta-lactamase) producing bacteria infection    Attention to tracheostomy (H)    Acute and chronic respiratory failure with hypoxia (H)    On mechanically assisted ventilation (H)    Encounter for palliative care    Encephalopathy    70 y.o. 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 Course c/b bilateral BAYLEE infarct with vasospasm. Extubated on 6/3/2021 and re-intubated on 6/6/2021 due to hypoxemia. On 6/7/2021, patient underwent tracheostomy and PEG tube placement. Currently being treated for PsA VAP.    Recommendations/Plans:    Continue phase 2 wean as tolerated    Ceftazidime/avibactam + metronidazole per ID    Bronchial hygiene w/ DuoNeb + HS nebs    Trach: 7 bivona, placed 6/7/21, most recently changed 7/05    SLP following, appreciate recommendations -- per RT there has been a significant increase in secretions that needed to be suctioned after patient was on PMV for several hours (improved once PMV was removed & cuff re-inflated); consistent w/ aspiration issues    Discussed my impressions and recommendations for care with RT.    Nia Bains MD  Pulmonary and Critical Care      -------------    Subjective  No acute events overnight. Remains on TM, decreased FiO2 needs. Continues to require suctioning. Multidisciplinary notes reviewed.    I was able to update both wife & daughter on the ipad in the room.    Current FiO2 (%): 35 % Current PEEP: +5    Tracheal secretions: frequent, moderate to large    Current phase of ventilator weaning pathway:  Phase 2  Ventilator weaning results from yesterday: TM 12 hours, vent at  night    Physical Exam:  Temp:  [98.1  F (36.7  C)-99.3  F (37.4  C)] 99.3  F (37.4  C)  Heart Rate:  [74-94] 83  Resp:  [18-28] 28  BP: (117-139)/(67-82) 117/67  FiO2 (%):  [24 %-30 %] 30 %    GEN: NAD, resting in bed  HEENT: MMM, trach in place  CVS: regular rhythm, no murmurs  RESP: clear in anterior lung fields, rhonchi improved over the last 24 hours  ABD: Soft, No abdominal pain with palpation   SKIN: Warm extremities, no visible rashes  EXT: warm, no edema  NEURO:  Grossly intact    Pertinent Labs: Lab Results: personally reviewed.     Pertinent Radiology: personally reviewed, including Radiology interpretations (as below):  CXR, 7/09:  Tracheostomy tube projects over tracheal air column. Increased discoid atelectasis at the left lung base. No other new focal consolidation. No pneumothorax or pleural effusion. Cardiac silhouette within normal limits. Stable tortuous aorta.

## 2021-07-13 NOTE — PLAN OF CARE
Problem: Device-Related Complication Risk (Mechanical Ventilation, Invasive)  Goal: Optimal Device Function  Outcome: No Change     Problem: Ventilator-Induced Lung Injury (Mechanical Ventilation, Invasive)  Goal: Absence of Ventilator-Induced Lung Injury  Outcome: No Change     Problem: Communication Impairment (Mechanical Ventilation, Invasive)  Goal: Effective Communication  Outcome: Improving     Problem: Inability to Wean (Mechanical Ventilation, Invasive)  Goal: Mechanical Ventilation Liberation  Outcome: Improving    SJ  RT PROGRESS NOTE    DATA:        CURRENT SETTINGS:  VENT SETTINGS   AC 14, 450, PEEP 5, 30%           Pt is on phase 2.  Pt started on 10L and 28-32% tm at 08:00.  No distress noted.  See pmv trial information below.        SECRETIONS:  Pt is being suctioned for pale yellow to white secretions.  Pt had PMV trial and copious secretions suctioned once cuff deflated.  Pt was able to tolerate 2h and 15 minutes with pmv.  PMV taken off due to increased suction/secretions and inability to clear mucous out on his own.  Secretions have been less with cuff inflated.    02 SATS:  95%    #7 bivona placed 7/5        Respiratory Medications: duo neb q6h, saline bid            NOTE / PLAN:   Pulmonary toileting.  Continue to support and encourage.  Family given update and all questions answered.

## 2021-07-13 NOTE — PLAN OF CARE
Problem: Restraint for Non-Violent/Non-Self-Destructive Behavior  Goal: Prevent/Manage Potential Problems  Description: Maintain safety of patient and others during period of restraint.  Promote psychological and physical wellbeing.  Prevent injury to skin and involved body parts.  Promote nutrition, hydration, and elimination.  Outcome: No Change          Monitored for safety, no complaints of pain, slept 5-6 hours the whole night.

## 2021-07-13 NOTE — PROGRESS NOTES
Social Work Note:    Writer received another call from spouse.  She was sharing her concerns and frustrations about certain aspects of the patient care.    Writer listened and offered understanding and support.  I referred her to the patient advocate-Gretel.      [facility] Chen [facility]  [phone]

## 2021-07-13 NOTE — PROGRESS NOTES
[facility] Hospitalist Daily Progress Note   Brief summary:  71 yo M with h/o HTN who presented to ED on 5/15/2021 with acute SAH after a fall with unresponsiveness.  He underwent emergent left external ventricular drain placement.  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  On 5/28/2021, patient had persistent cerebral vasospasm, CT head showed bilateral BAYLEE infarct and was treated with milrinone and verapamil.  On 6/1/2021, angiogram showed no evidence of vasospasm.  Extubated on 6/3/2021.  6/6/2021 he was reintubated due to hypoxia.  On 6/7/2021, patient underwent tracheostomy and PEG tube placement.  EVD removed 6/9/2021.  Unasyn for possible pneumonia on 5/20/2021 which was switched to ceftriaxone the next day for sputum culture growing Klebsiella.  On 5/23, sputum culture also grew Pseudomonas so switched to Zosyn.  Due to increased WBCs in CSF possibility of ventriculitis was raised however CSF culture was negative but antibiotic was switched to cefepime and vancomycin on 5/28/2021 for 2 weeks.  Vanco was discontinued on 6/9/2021.  On 6/5/2021, sputum grew ESBL so cefepime was switched to meropenem.  He was [facility] to [facility] on 6[facility]11[facility]21. Repeat sputum culture 6/19 showed MDR pseudomonas and he was started on tobramycin nebs from 6/24/2021 - 7/9/2021 . He has persistent tracheal secretions.     6/14/21 CT head done for fatigue- read as slight increase in caliber of lateral and third ventricle with possibility of developing communicating hydrocephalus. [facility] who did not think there was much change, so repeat CT head on 6/16/21 was done which did not show any change.     7/8/2021 aspirated tube feeds.  Spiked a fever that evening 102.8 and WBC went up to 36.9 the next morning so ID reconsulted and has been started on ceftazadime-avibactam along with flagyl on 7/9.       RT and pulmonary are following and pt is on phase 2 weaning with TM/PMV during day and full vent at night. Vent weaning slowed due to poor cough and secretions.Mental status is slowly improving with ritalin.Tolerating tube feeds    He has severe debility and malnourishment and is progressing slowly and appears malnourishment, weakness is major factor in slow progression and at risk for set backs.     Assessment/Plan     Principal Problem:    SAH (subarachnoid hemorrhage) (H)  Active Problems:    Acute respiratory failure with hypoxia (H)    ESBL (extended spectrum beta-lactamase) producing bacteria infection    Attention to tracheostomy (H)    Acute and chronic respiratory failure with hypoxia (H)    On mechanically assisted ventilation (H)    Encounter for palliative care    Encephalopathy    Metabolic encephalopathy    Idiopathic hypertension    Oropharyngeal dysphagia    Moderate protein-calorie malnutrition (H)    Pseudomonas aeruginosa infection    Acute tracheobronchitis    Cerebrovascular accident (CVA) due to occlusion of cerebral artery (H)    Aspiration pneumonia due to gastric secretions, unspecified laterality, unspecified part of lung (H)        Sepsis due to aspiration pneumonia  -Pt spiked fever with tachycardia on evening of 7/8  -Likely aspirated feeding formula as tube feeds suctioned from trach  -CXR early 7/9 didn't show any signs of aspiration but may be too early to see changes.  -Procalcitonin mildly elevated, blood cx pending. Respiratory cx shows PML.   -Abd xray shows percutaneous gastrostomy, no tube internally  -Ceftazidime-avibactam with flagyl started 7/9/2021 per ID  -WBC wnl today    Hypernatremia   -RD increased free water flushes.  -Na levels today pending.     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   Continue pulmonary toilet, scheduled nebs     Recent subarachnoid hemorrhage  Presented with fall and unresponsiveness on 5/15/2021.  S/p EVD, angiogram showing ruptured posterior communicating aneurysm followed by craniotomy with clipping of PCOM aneurysm on the same day.  EVD removed on 6/9/2021.   Postoperative period was complicated by cerebral vasospasm, bilateral BAYLEE infarct. MRI done on 5/28 showed right frontal parietal, temopral and left parasagittal parietal lobes infarcts.   On 6/1/2021, angiogram showed no vasospasm.  Will need to follow up with Dr. Martinez in Neurosurgery clinic in 3 months (mid august) with repeat CTA of head and neck for post-operative follow up     Metabolic encephalopathy: multifactorial  On 1:1 and off restraints per family request as the mitts dont fit him well.  CT head 7/1 no new changes  Per discussion with neurology (7/1/21) and NSG (7/7/21), pt started on trial of ritalin, on 10mg AM and noon which seems to help.      Anemia - likely due to anemia of chronic disease at this point,  Hb stable around 8-9.     Hypertension-stable  On lisinopril, hydralazine prn     Oropharyngeal dysphagia  On tube feeding via PEG tube, dietitian, speech following.      Inflamed possibly sebaceous cyst, left inguinal area,   Keflex discontinued - now on Abx as above per ID.  Needs outpatient Surgery evaluation for removal - can follow up for both inguinal hernia and cyst as outpatient     Moderate protein-calorie malnutrition  On tube feeding  RD following     Left inguinal hernia- Monitoring  Needs outpatient evaluation     Severe debility, weakness, critical illness, deconditioning, Continue PT/OT     DVT prophylaxis: Heparin sq  GI prophylaxis: Pepcid      Subjective:   Chart reviewed and events noted.  Patient seen and  examined.   Pt states hand pain is ok today. Appears comfortable, slow to respond. Denies any other pain or shortness of breath.      ROS  Limited, please see above.     Objective:  Vital signs     Temp:  [98.1  F (36.7  C)-100.1  F (37.8  C)] 98.1  F (36.7  C)  Heart Rate:  [] 96  Resp:  [17-33] 17  BP: (117-147)/(64-89) 147/78  FiO2 (%):  [35 %-40 %] 35 %     Weight     Scheduled Meds:    ceftazidime-avibactam (AVYCAZ) IVPB  2.5 g Intravenous Q8H     famotidine  20 mg Enteral Tube BID     guar gum  1 packet Enteral Tube TID     heparin (PF) ANTICOAGULANT  5,000 Units Subcutaneous Q8H FIXED TIMES     ipratropium-albuteroL  3 mL Nebulization Q6H - RT     lisinopriL  20 mg Enteral Tube Daily     menthol-zinc oxide   Topical TID     methylphenidate HCl  10 mg Oral 2 times per day     metroNIDAZOLE  500 mg Intravenous Q12H     nystatin  5 mL Swish & Swallow QID     sodium chloride  3 mL Nebulization BID     sodium chloride  3 mL Intravenous Line Care      Continuous Infusions:   PRN Meds:.acetaminophen **OR** acetaminophen, alteplase, bisacodyL, dextrose 50 % (D50W), docusate sodium, glucagon (human recombinant), hydrALAZINE, loperamide, magnesium hydroxide, naloxone **OR** naloxone **OR** naloxone **OR** naloxone, ondansetron, polyethylene glycol, sodium chloride 0.9%, sodium chloride bacteriostatic, sodium chloride  Intake/Output     I/O last 3 completed shifts:  In: 1813 [I.V.:3; NG/GT:1575; IV Piggyback:235]  Out: 625 [Urine:625]     Physical Exam:        General Appearance:Chronically ill appearing , NAD  HEENT- NC  Neck- Trach+   Lungs: Diminished BS  Cardiovascular: RRR  Abdomen: Soft, non distended  Extremities:  no edema  Neurologic: Awake, alert.        Imaging: I have independently reviewed reports.     Lab Results: I have independently reviewed lab results.     All medication issues identified within the last 24 hours have been addressed and completed as appropriate.     Barriers to disposition:  Vent  and trach weaning, sepsis work up  Expected discharge- Several days.     Discussed with RN, SW and patients spouse/daughter over the ipad in the room     Kim Knox MD  Hospitalist

## 2021-07-13 NOTE — PROGRESS NOTES
Palliative Care    Susy called my partner Placido and I returned her call and she was speaking to the SLP on the tablet.   She will call me back.   I will be covering LTAC the next 2 weeks and will be seeing Dayron tomorrow.    SISI Romero, NP-C, [facility]

## 2021-07-14 ENCOUNTER — APPOINTMENT (OUTPATIENT)
Dept: OCCUPATIONAL THERAPY | Facility: CLINIC | Age: 71
DRG: 207 | End: 2021-07-14
Attending: HOSPITALIST
Payer: COMMERCIAL

## 2021-07-14 ENCOUNTER — APPOINTMENT (OUTPATIENT)
Dept: SPEECH THERAPY | Facility: CLINIC | Age: 71
DRG: 207 | End: 2021-07-14
Attending: HOSPITALIST
Payer: COMMERCIAL

## 2021-07-14 ENCOUNTER — APPOINTMENT (OUTPATIENT)
Dept: PHYSICAL THERAPY | Facility: CLINIC | Age: 71
DRG: 207 | End: 2021-07-14
Attending: HOSPITALIST
Payer: COMMERCIAL

## 2021-07-14 LAB
AEROBIC BLOOD CULTURE BOTTLE: NO GROWTH
AEROBIC BLOOD CULTURE BOTTLE: NO GROWTH
ANAEROBIC BLOOD CULTURE BOTTLE: NO GROWTH
ANAEROBIC BLOOD CULTURE BOTTLE: NO GROWTH
ANION GAP SERPL CALCULATED.3IONS-SCNC: 10 MMOL/L (ref 5–18)
BUN SERPL-MCNC: 24 MG/DL (ref 8–22)
CALCIUM SERPL-MCNC: 10.5 MG/DL (ref 8.5–10.5)
CHLORIDE BLD-SCNC: 105 MMOL/L (ref 98–107)
CO2 SERPL-SCNC: 29 MMOL/L (ref 22–31)
CREAT SERPL-MCNC: 0.57 MG/DL (ref 0.7–1.3)
GFR SERPL CREATININE-BSD FRML MDRD: >90 ML/MIN/1.73M2
GLUCOSE BLD-MCNC: 124 MG/DL (ref 70–125)
POTASSIUM BLD-SCNC: 4.1 MMOL/L (ref 3.5–5)
SODIUM SERPL-SCNC: 144 MMOL/L (ref 136–145)

## 2021-07-14 PROCEDURE — 94003 VENT MGMT INPAT SUBQ DAY: CPT | Performed by: INTERNAL MEDICINE

## 2021-07-14 PROCEDURE — 94640 AIRWAY INHALATION TREATMENT: CPT | Mod: 76

## 2021-07-14 PROCEDURE — 999N000123 HC STATISTIC OXYGEN O2DAILY TECH TIME

## 2021-07-14 PROCEDURE — 250N000013 HC RX MED GY IP 250 OP 250 PS 637: Performed by: HOSPITALIST

## 2021-07-14 PROCEDURE — 250N000011 HC RX IP 250 OP 636

## 2021-07-14 PROCEDURE — 250N000009 HC RX 250: Performed by: INTERNAL MEDICINE

## 2021-07-14 PROCEDURE — 94003 VENT MGMT INPAT SUBQ DAY: CPT

## 2021-07-14 PROCEDURE — 82310 ASSAY OF CALCIUM: CPT | Performed by: HOSPITALIST

## 2021-07-14 PROCEDURE — 97110 THERAPEUTIC EXERCISES: CPT | Mod: GO | Performed by: OCCUPATIONAL THERAPIST

## 2021-07-14 PROCEDURE — 92526 ORAL FUNCTION THERAPY: CPT | Mod: GN

## 2021-07-14 PROCEDURE — 97535 SELF CARE MNGMENT TRAINING: CPT | Mod: GO | Performed by: OCCUPATIONAL THERAPIST

## 2021-07-14 PROCEDURE — 97112 NEUROMUSCULAR REEDUCATION: CPT | Mod: GP | Performed by: PHYSICAL THERAPIST

## 2021-07-14 PROCEDURE — 99207 PR NO CHARGE LOS MISSING/LACKING DOCUMENTATION: CPT | Performed by: NURSE PRACTITIONER

## 2021-07-14 PROCEDURE — 999N000009 HC STATISTIC AIRWAY CARE

## 2021-07-14 PROCEDURE — 99232 SBSQ HOSP IP/OBS MODERATE 35: CPT | Performed by: INTERNAL MEDICINE

## 2021-07-14 PROCEDURE — 250N000011 HC RX IP 250 OP 636: Performed by: INTERNAL MEDICINE

## 2021-07-14 PROCEDURE — 92507 TX SP LANG VOICE COMM INDIV: CPT | Mod: GN

## 2021-07-14 PROCEDURE — 99233 SBSQ HOSP IP/OBS HIGH 50: CPT | Performed by: HOSPITALIST

## 2021-07-14 PROCEDURE — 120N000018 HC R&B RESPIRATORY CARE WITH ATTENDANT

## 2021-07-14 PROCEDURE — 97110 THERAPEUTIC EXERCISES: CPT | Mod: GP | Performed by: PHYSICAL THERAPIST

## 2021-07-14 PROCEDURE — 999N000157 HC STATISTIC RCP TIME EA 10 MIN

## 2021-07-14 PROCEDURE — 36415 COLL VENOUS BLD VENIPUNCTURE: CPT | Performed by: HOSPITALIST

## 2021-07-14 PROCEDURE — 99233 SBSQ HOSP IP/OBS HIGH 50: CPT | Performed by: NURSE PRACTITIONER

## 2021-07-14 PROCEDURE — 94640 AIRWAY INHALATION TREATMENT: CPT

## 2021-07-14 PROCEDURE — 97530 THERAPEUTIC ACTIVITIES: CPT | Mod: GP | Performed by: PHYSICAL THERAPIST

## 2021-07-14 PROCEDURE — 250N000013 HC RX MED GY IP 250 OP 250 PS 637

## 2021-07-14 RX ORDER — ACETYLCYSTEINE 200 MG/ML
2 SOLUTION ORAL; RESPIRATORY (INHALATION) 2 TIMES DAILY
Status: DISCONTINUED | OUTPATIENT
Start: 2021-07-14 | End: 2021-07-19

## 2021-07-14 RX ORDER — LISINOPRIL 20 MG/1
40 TABLET ORAL DAILY
Status: DISCONTINUED | OUTPATIENT
Start: 2021-07-14 | End: 2021-09-06

## 2021-07-14 RX ADMIN — Medication 1 PACKET: at 14:09

## 2021-07-14 RX ADMIN — METRONIDAZOLE 500 MG: 500 INJECTION, SOLUTION INTRAVENOUS at 08:30

## 2021-07-14 RX ADMIN — HEPARIN SODIUM 5000 UNITS: 5000 INJECTION, SOLUTION INTRAVENOUS; SUBCUTANEOUS at 21:46

## 2021-07-14 RX ADMIN — IPRATROPIUM BROMIDE AND ALBUTEROL SULFATE 3 ML: .5; 3 SOLUTION RESPIRATORY (INHALATION) at 20:16

## 2021-07-14 RX ADMIN — SODIUM CHLORIDE 30 MG/ML INHALATION SOLUTION 3 ML: 30 SOLUTION INHALANT at 08:26

## 2021-07-14 RX ADMIN — IPRATROPIUM BROMIDE AND ALBUTEROL SULFATE 3 ML: .5; 3 SOLUTION RESPIRATORY (INHALATION) at 02:25

## 2021-07-14 RX ADMIN — NYSTATIN 500000 UNITS: 100000 SUSPENSION ORAL at 17:11

## 2021-07-14 RX ADMIN — CEFTAZIDIME, AVIBACTAM 2.5 G: 2; .5 POWDER, FOR SOLUTION INTRAVENOUS at 01:26

## 2021-07-14 RX ADMIN — CEFTAZIDIME, AVIBACTAM 2.5 G: 2; .5 POWDER, FOR SOLUTION INTRAVENOUS at 10:02

## 2021-07-14 RX ADMIN — ANORECTAL OINTMENT: 15.7; .44; 24; 20.6 OINTMENT TOPICAL at 20:47

## 2021-07-14 RX ADMIN — SODIUM CHLORIDE 30 MG/ML INHALATION SOLUTION 3 ML: 30 SOLUTION INHALANT at 20:16

## 2021-07-14 RX ADMIN — HEPARIN SODIUM 5000 UNITS: 5000 INJECTION, SOLUTION INTRAVENOUS; SUBCUTANEOUS at 05:06

## 2021-07-14 RX ADMIN — FAMOTIDINE 20 MG: 20 TABLET, FILM COATED ORAL at 20:46

## 2021-07-14 RX ADMIN — IPRATROPIUM BROMIDE AND ALBUTEROL SULFATE 3 ML: .5; 3 SOLUTION RESPIRATORY (INHALATION) at 14:17

## 2021-07-14 RX ADMIN — ACETYLCYSTEINE 2 ML: 200 SOLUTION ORAL; RESPIRATORY (INHALATION) at 20:16

## 2021-07-14 RX ADMIN — Medication 1 PACKET: at 08:30

## 2021-07-14 RX ADMIN — IPRATROPIUM BROMIDE AND ALBUTEROL SULFATE 3 ML: .5; 3 SOLUTION RESPIRATORY (INHALATION) at 08:27

## 2021-07-14 RX ADMIN — HEPARIN SODIUM 5000 UNITS: 5000 INJECTION, SOLUTION INTRAVENOUS; SUBCUTANEOUS at 14:10

## 2021-07-14 RX ADMIN — NYSTATIN 500000 UNITS: 100000 SUSPENSION ORAL at 21:46

## 2021-07-14 RX ADMIN — Medication 1 PACKET: at 20:46

## 2021-07-14 RX ADMIN — LISINOPRIL 40 MG: 20 TABLET ORAL at 18:25

## 2021-07-14 RX ADMIN — NYSTATIN 500000 UNITS: 100000 SUSPENSION ORAL at 12:02

## 2021-07-14 RX ADMIN — METHYLPHENIDATE HYDROCHLORIDE 10 MG: 10 TABLET ORAL at 12:02

## 2021-07-14 RX ADMIN — METRONIDAZOLE 500 MG: 500 INJECTION, SOLUTION INTRAVENOUS at 20:40

## 2021-07-14 RX ADMIN — FAMOTIDINE 20 MG: 20 TABLET, FILM COATED ORAL at 08:30

## 2021-07-14 RX ADMIN — ANORECTAL OINTMENT: 15.7; .44; 24; 20.6 OINTMENT TOPICAL at 13:18

## 2021-07-14 RX ADMIN — CEFTAZIDIME, AVIBACTAM 2.5 G: 2; .5 POWDER, FOR SOLUTION INTRAVENOUS at 18:04

## 2021-07-14 RX ADMIN — METHYLPHENIDATE HYDROCHLORIDE 10 MG: 10 TABLET ORAL at 08:30

## 2021-07-14 RX ADMIN — ANORECTAL OINTMENT: 15.7; .44; 24; 20.6 OINTMENT TOPICAL at 08:30

## 2021-07-14 RX ADMIN — NYSTATIN 500000 UNITS: 100000 SUSPENSION ORAL at 08:30

## 2021-07-14 ASSESSMENT — MIFFLIN-ST. JEOR: SCORE: 1428.89

## 2021-07-14 NOTE — PLAN OF CARE
Problem: Anxiety  Goal: Anxiety Reduction or Resolution  Outcome: Improving   Patient appeared much calmer with a staff in the room. Patient was cooperative with cares and tolerated  repositions well. Patient denied.

## 2021-07-14 NOTE — PROGRESS NOTES
PALLIATIVE CARE PROGRESS NOTE     Patient Name: Dayron Neri  Date of Admission: 6/11/2021   Today the patient was seen for: followup     Impressions & Recommendations:  Goals of care per Restorative  Wife wants him to  Regain his speech, have less secretions, have trach out eventually    Code Status: Full Code, confirmed again on 7/8      Symptom management  # Encephalopathy:   Comment: in setting of SAH s/p craniotomy and clipping P-comm ruptured aneurysm, and b/l infarct.  EEG neg for seizures.  Repeat CT 7/1 with no acute changes.  Slow progression, alertness wax/wanes  Recommendations:   - Methylphenidate started 7/7  : This has greatly helped his alertness  - consider switching nebs to albuterol only to avoid anticholinergics in setting of encephalopathy   - Avoid benzodiazepines, antihistamines, anticholinergics if able  - Lights on and blinds open during the day.  Reorient frequently  - Lights & TV off during the night.  Promote normal circadian rhythm  - Limit sensory deprivation - utilize hearing aids, glasses, etc  - Frequently assess unmet bathroom needs if applicable.          # Fatigue in setting of acute illness -   Comments:  slowly improving again after recent decline  Recommendations:   - PT/OT, OOB    # Dyspnea  Comment: was on nocturnal 02, asp pneumonia 7/9 and now back on ful vent    Psychosocial/spiritual support    En HARTMANN following    Wife, daughter are supportive    [facility]     Advanced Care Planning    Patient has a completed Health Care Directive: no     Health care agent: wife Susy      ----------------------------------------------------------------------------------------------------------------  Summary:  Dayron Neri is a    70 yoM with PMH HTN admitted to [facility] 5/15/21  for AMS and a fall. Found to have acute subarachnoid hemorrhage and underwent left external ventricular drain placement.  Angiogram confirmed ruptured aneurysm and underwent craniotomy with clipping of  PCOM aneurysm.  Course complicated by CT head showing bilateral BAYLEE infarct.  Extubated 5/16, re-intubated 5/19, extubated again 6/3/2021.  Re-intubated again 6/6/2021 d/t hypoxia.  S/p trach/PEG on 6/7/2021, EVD removed 6/9/2021.  Admitted to [facility] 6/11.          Interim: Was on Nocturnal vent,now back on full vent support since 7/9.   He is more alert than last week. Follows commands and nods head to questions  ----------------------------------------------------------------------------------------------------------------  Palliative Overview:    Key Palliative Symptom data:  Pain: denies  Dyspnea: mild, copious secretions  Nausea:none  Anxiety:none    Prognosis, Goals, and/or Advance Care Planning were addressed today: Susy  Said she knows the outcome if he doesn't improve and she is not ready to go there    Mood, coping, and/or meaning in the context of serious illness were addressed today:yes*     Family discussion  I met with patient, who was sitting in his chair, along with his wife and daughter who were on the iPad monitor.  My colleague En,  was also present.  I reintroduced myself as I had spoken to Susy, yesterday and also upon first consultation from palliative care.  Susy and her daughter expressed quite a lot of anger with issues that have occurred at the [facility].  We talked a little bit about this yesterday and she did talk to the patient [employer] Representative Gretel at my request.  She states her issues were little things that have added up.  1 thing was that he had mitts on for agitation and they were so tight and small that they were causing him sores on his arms.  She is also was agitated with the speech therapist as she did not feel like they were trying to have him on the speaking valve.  (Part of the reason for this is increased secretions coming from his trach).  She also feels that therapy is not being aggressive enough with him.  ( I chose to listen to her concerns and build a  "relationship, but I am concerned because he does not have the strength to participate in therapy right now.  Therapy is   passive at this point due to shaheen, her expectations are for therapy to be more aggressive.)  She is frustrated by multiple setbacks including Pseudomonas and aspiration pneumonia.  She indicates that she feels like he is on a holding pattern.  The best day she has seen him was on July 3 when he sat in the chair for 5.5 hours and could converse, was very alert and answering questions appropriately via the speaking valve.  She is upset that he aspirated resulting in  pneumonia.  Her  was attentive to this conversation and I recommended that we hold on this anymore for now because he seemed to be becoming discouraged.  She then would talk more reassuring to him after I mentioned that.    ROS  A full 14 point review of systems was otherwise completed and is negative aside from that mentioned above    -----------------------------------------------------------------------------------------------------------------  I have reviewed and supplemented the documentation in this patient's medical record listed below regarding active medical problems, allergies, and medications.     Current Problem List:   Active Problems:    SAH (subarachnoid hemorrhage) (H)      Allergies:  No Known Allergies    PERTINENT PHYSICAL EXAMINATION:  Vital Signs: Blood pressure (!) 153/98, pulse 80, temperature 98.9  F (37.2  C), temperature source Axillary, resp. rate 24, height 1.803 m (5' 10.98\"), weight 63.7 kg (140 lb 7 oz), SpO2 94 %.   GENERAL: Sitting in chair, alert, following commands.  SKIN: Warm and dry   HEENT: Normocephalic, anicteric sclera, moist mucous membranes  LUNGS: Trach in place, multiple secretions, copious, requested nurse to suction him a few times    CARDIAC: RRR, normal s1/s2, w/o m/r/g   ABDOMINAL: BS (+), soft, non distended, non tender  : fournier in place  MUSKL: no gross joint " deformities   EXTREMITIES: No edema or cyanosis, pulses 2+ and symmetrical  NEUROLOGIC: Alert and oriented though fatigued  PSYCH: Appears mildly depressed    All labs/imaging reviewed in Epic     ====================================================  TT: I have personally spent a total of 35 minutes on the unit in review of medical record, consultation with the medical providers and assessment of patient today, with more than 50% of this time spent in counseling, coordination of care, and discussion with prognosis, symptom management, risks and benefits of management options, and development of plan of care as noted above.  ====================================================      In addition to above 35 minutes, also 45 minutes spent in FCC with wife and daughter    Total Visit Time:45 plus 35 min  o   o For Inpatient, 'Total Visit Time' = Unit Floor + Face-to-Face time.    Total Face-to-Face Prolonged Service Time: 1 hours, 25 min    Content of the Prolonged Time:familyn meeting     SISI Romero, NP-C, [facility]    [facility]  Palliative Medicine  Office: [phone]

## 2021-07-14 NOTE — PROGRESS NOTES
Social Work Note:  Patient chart reviewed.  Patient discussed in morning rounds.  Barriers to discharge: Trach. Vent. Frequent suction.   HFTM.  IV abx. 1:1 sitter.    Writer has had conversations x2 with spouse this week to offer listening support.  Spouse had concerns which were passed along to unit supervisor. Writer also referred spouse to patient advocate.    Patient's exact discharge date, time, disposition TBD.  SW will continue to follow for psychosocial and emotional support of patient and family. SW to facilitate discharge to SNF/TCU when pt no longer requires LTACH level of care.      Dayron Chen, [facility]  LTACH SW  [id number]

## 2021-07-14 NOTE — TREATMENT PLAN
Spoke with the patient's wife via video call in the patient's room. Susy has ongoing concerns about patient cares and preferences. Our leader and the patient advocate is also connecting with her .  Susy would  Like the following to be implemented in a daily basis:  Ipad needs to be turned on by 0830.  Watch alarms-anticipate alarms  Remember to place glasses on and face patient towards TV if awake/watching.   =Susy wants to re-iterate that she is appreciative of nursing care...  Above's request will be communicated to the staff during huddle and charge report and will be communicated to the primary nurse in shift basis. This will be added in the Team sticky notes too.    Petr Patton RN

## 2021-07-14 NOTE — PROGRESS NOTES
[facility]    Infectious Disease Progress Note    Date of Service: 07/14/2021     Assessment & Plan   Dayron Neri is a 70 year old male who was admitted on 6/11/2021.   Please see previous note by Dr. Edwards     1. Fever, leukocytosis. Improving    Most likely from aspiration event 7/8, noted to cough up tube feeds. Chemical pneumonitis with likely infection. WBC 36k 7/9. Due to history of MDR pseudomonas broadened coverage -- ceftolozane-tazobactam if available, plus flagyl for anaerobes.   Klebsiella infection.  2. 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 Has been on tobramycin nebs since 6/24 -- this may suppress pseudomonas from contributing to current infection.   3. 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 Found to have BAYLEE infarction.   4. Treated for possible ventriculitis with 2 weeks broad spectrum antibiotics based on WBC in CSF in setting of blood in CSF. WBC/RBC ratios may not correlate well even in absence of infection due to blood being irritant to meninges and causing inflammation.   5. H/o ESBL klebsiella in sputum  Principal Problem:    SAH (subarachnoid hemorrhage) (H)  Active Problems:    Acute respiratory failure with hypoxia (H)    ESBL (extended spectrum beta-lactamase) producing bacteria infection    Attention to tracheostomy (H)    Acute and chronic respiratory failure with hypoxia (H)    On mechanically assisted ventilation (H)    Encounter for palliative care    Encephalopathy    Metabolic encephalopathy    Idiopathic hypertension    Oropharyngeal dysphagia    Moderate protein-calorie malnutrition (H)    Pseudomonas aeruginosa infection    Acute tracheobronchitis    Cerebrovascular accident (CVA)  due to occlusion of cerebral artery    RECOMMENDATIONS:    1. Continue ceftazidime/avibactam 2.5g q8h,  plus flagyl  2. Respiratory culture through endotracheal aspirate showing Klebsiella.  3. Total duration of  antibiotics: 7 days and reassess.  End date: 7/18/2021.  4. Updated patient's nurse at bedside.  Patient evaluated.  5. Infectious disease will sign off.  Please see previous notes by Dr. Rene.  Please call with questions.     Dorene Kim MD  [facility]  [phone]                Sputum culture  [facility] Epic:   Specimen Information: Respiratory         0 Result Notes    Culture 2+ Klebsiella oxytoca               Gram Stain Result 3+ Polymorphonuclear leukocytes      No organisms seen              Resulting Agency: [facility]     Susceptibility     Klebsiella oxytoca     SUSAN     Ampicillin Resistant     Ampicillin + Sulbactam Intermediate     Cefazolin Resistant     Cefepime Sensitive     Ceftriaxone Sensitive     Ciprofloxacin Sensitive     Gentamicin Sensitive     Levofloxacin Sensitive     Meropenem Sensitive     Piperacillin + Tazobactam Sensitive     Tetracycline Sensitive     Tobramycin Sensitive     Trimethoprim + Sulfamethoxazole Sensitive                 Specimen Collected: 07/09/21 16:11 Last Resulted: 07/12/21 11:47               Interval History   Chart reviewed  Patient trached,  Sitting up at edge of bed.  Nods to questions.  Still has secretions.  Nurse at bedside    Physical Exam   Temp: 98.9  F (37.2  C) Temp src: Axillary BP: (!) 153/98 Pulse: 80   Resp: 24 SpO2: 97 % O2 Device: Trach dome Oxygen Delivery: 10 LPM  Vitals:    07/11/21 0827 07/12/21 0602 07/14/21 0612   Weight: 64.5 kg (142 lb 3.2 oz) 64.9 kg (143 lb) 63.7 kg (140 lb 7 oz)     Vital Signs with Ranges  Temp:  [98.2  F (36.8  C)-98.9  F (37.2  C)] 98.9  F (37.2  C)  Pulse:  [78-96] 80  Resp:  [14-27] 24  BP: (140-153)/(86-98) 153/98  FiO2 (%):  [28 %] 28 %  SpO2:  [94 %-100 %] 97 %    Exam:  Constitutional: awake, aphasia  Head:Normocephalic.  Neck: trach  ENT: tongue coating (adding better with nystatin per nurse at bedside)  Cardiovascular: negative  Respiratory: trach, secretions, suctioned about 4  times a day  Gastrointestinal: non tender  : Deferred  Musculoskeletal: deconditioned, in chair  Skin: warm  Neurologic: nods to questions      Medications     sodium chloride         cefTAZidime-avibactam  2.5 g Intravenous Q8H     famotidine  20 mg Oral or Feeding Tube BID     fiber modular (NUTRISOURCE FIBER)  1 packet Per Feeding Tube TID     heparin ANTICOAGULANT  5,000 Units Subcutaneous Q8H     ipratropium - albuterol 0.5 mg/2.5 mg/3 mL  3 mL Nebulization Q6H     lisinopril  40 mg Oral or Feeding Tube Daily     menthol-zinc oxide   Topical TID     methylphenidate  10 mg Oral or Feeding Tube BID     metroNIDAZOLE  500 mg Intravenous Q12H     nystatin  500,000 Units Mouth/Throat 4x Daily     sodium chloride  3 mL Nebulization BID     sodium chloride (PF)  3 mL Intracatheter Q8H       Data   All microbiology laboratory data reviewed.  Recent Labs   Lab Test 07/13/21  1012 07/12/21  1748 07/11/21  1415   WBC 8.4 10.3 13.4*   HGB 9.7* 9.6* 9.1*   HCT 31.0* 31.0* 28.4*   MCV 99 99 99    284 260     Recent Labs   Lab Test 07/14/21  0626 07/13/21  1012 07/12/21  0637   CR 0.57* 0.54* 0.57*     Recent Labs   Lab Test 05/25/21  0335   *     MICROBIOLOGY:    Sputum culture          0 Result Notes    Culture 2+ Klebsiella oxytoca               Gram Stain Result 3+ Polymorphonuclear leukocytes      No organisms seen              Resulting Agency: [facility]     Susceptibility     Klebsiella oxytoca     SUSAN     Ampicillin Resistant     Ampicillin + Sulbactam Intermediate     Cefazolin Resistant     Cefepime Sensitive     Ceftriaxone Sensitive     Ciprofloxacin Sensitive     Gentamicin Sensitive     Levofloxacin Sensitive     Meropenem Sensitive     Piperacillin + Tazobactam Sensitive     Tetracycline Sensitive     Tobramycin Sensitive     Trimethoprim + Sulfamethoxazole Sensitive                 Specimen Collected: 07/09/21 16:11 Last Resulted: 07/12/21 11:47               Recent Labs   Lab Test 06/08/21  2091  06/01/21  1140 06/01/21  1137 06/01/21  1136 06/01/21  1130 05/30/21  1000 05/29/21  1141 05/28/21  1614 05/28/21  0638   CULT Moderate growth  Normal he    Light growth  Pseudomonas aeruginosa  * No growth Heavy growth  Staphylococcus epidermidis  Susceptibility testing not routinely done  *  Moderate growth  Pseudomonas aeruginosa  Susceptibility testing done on previous specimen  *  Light growth  Klebsiella oxytoca ESBL  ESBL (extended beta lactamase) producing organisms require contact precautions.  *  Critical Value/Significant Value called to and read back by  Roman Hightower RN at 2122 6/4/21 hg   No anaerobes isolated  No growth No growth No anaerobes isolated  No growth No anaerobes isolated  No growth No anaerobes isolated  No growth No growth       RADIOLOGY: results reviewed    US Testicular & Scrotum w Doppler Ltd    Result Date: 6/22/2021  EXAM: US SCROTUM AND TESTICLES WITH DUPLEX LIMITED LOCATION: [facility] DATE/TIME: 6/22/2021 12:41 PM INDICATION: Enlarged left scrotum. COMPARISON: None available. TECHNIQUE: Ultrasound of scrotum with color flow and spectral Doppler with waveform analysis performed. FINDINGS: RIGHT: Right testicle measures 4.7 x 2.2 x 2.4 cm. Normal testicle with no masses. Normal arterial duplex and normal color flow. 4 mm cyst right epididymal head. No hydrocele. No varicocele. LEFT: Left testicle measures 4.9 x 2.6 x 3.2 cm. Normal testicle with no masses. Normal arterial duplex and normal color flow. Normal epididymis. No hydrocele. No varicocele. Large left inguinal hernia containing mesenteric fat and bowel, extending into the left superior scrotum.     1.  Large left inguinal hernia containing mesenteric fat and bowel, extending to the superior scrotum. 2.  No evidence of intratesticular mass. 3.  Incidental tiny right epididymal head cyst.    XR Chest Port 1 View    Result Date: 7/9/2021  EXAM: XR CHEST 1 VIEW PORTABLE LOCATION: [facility]  [facility] DATE/TIME: 7/9/2021 12:52 AM INDICATION: Coughing up tube feeding COMPARISON: 06/28/2021     Tracheostomy tube projects over tracheal air column. Increased discoid atelectasis at the left lung base. No other new focal consolidation. No pneumothorax or pleural effusion. Cardiac silhouette within normal limits. Stable tortuous aorta.    XR Chest Port 1 View    Result Date: 6/28/2021  EXAM: XR CHEST 1 VIEW PORTABLE LOCATION: [facility] DATE/TIME: 6/28/2021 10:41 AM INDICATION: increased secretion COMPARISON: 06/18/2021     Tracheostomy tube in good position. Some stable minimal fibrosis left lower lung. Chest otherwise negative. No significant new findings.    XR Chest Port 1 View    Result Date: 6/18/2021  EXAM: XR CHEST 1 VIEW PORTABLE LOCATION: [facility] DATE/TIME: 6/18/2021 4:37 PM INDICATION: increased secretions COMPARISON: 06/14/2021, 06/09/2021     Tracheostomy tube in place. Left basilar opacities partially silhouetting the left hemidiaphragm are unchanged and again presumed to reflect some atelectasis. Right lung is clear. Heart and pulmonary vascularity are normal. Gastrostomy tube.    XR Abdomen Port 1 View    Result Date: 7/9/2021  EXAM: XR ABDOMEN AP PORTABLE LOCATION: [facility] DATE/TIME: 7/9/2021 10:50 AM INDICATION: Feeding tube placement COMPARISON: None.     Subsegmental atelectasis in the medial basal left lower lobe. Percutaneous gastrostomy projects in the left upper quadrant just to the left of L2. No nasoenteric tube is present. Bowel gas pattern is normal. Nothing for obstruction or free air. No evidence for renal stones.     CT Head w/o Contrast    Result Date: 7/1/2021  EXAM: CT HEAD WO CONTRAST LOCATION: [facility] DATE/TIME: 7/1/2021 11:06 AM INDICATION: Altered mental status not improving. COMPARISON: 06/16/2021 head CT. TECHNIQUE: Routine CT Head  without IV contrast. Multiplanar reformats. Dose reduction techniques were used. FINDINGS: INTRACRANIAL CONTENTS: Since prior study, temporal evolution of multicompartment intracranial hemorrhage with near complete resolution of layering hemorrhage within the lateral ventricles and of extra-axial fluid deep to the right frontal craniotomy site. Some dural thickening and/or hyperdense blood products persist extending to the right middle cranial fossa as before. No new or progressive hemorrhage. Low-density changes with sulcal effacement/mass effect redemonstrated in the right frontal lobe.  Suggested area of intraparenchymal hemorrhage on previous study appears to have resolved. The degree of mass effect overall is similar to prior study with overall stable/unchanged ventricular caliber, including dilation of the lateral and third ventricles. Volume loss in the right middle cranial fossa/right temporal lobe redemonstrated. Probable evolving subacute phase nonhemorrhagic ischemia in the right frontal lobe and paramedian posterior left frontal level. No definite new areas of acute ischemic involvement are identified. Underlying chronic ischemic changes are identified in the deep white matter of both cerebral hemispheres estimated mild to moderate. Position of cerebellar tonsils is satisfactory. Sella appears unremarkable. VISUALIZED ORBITS/SINUSES/MASTOIDS: No acute orbital process. Mild membrane thickening in the sphenoid sinus. No middle ear or mastoid effusion. BONES/SOFT TISSUES: Postoperative changes of right frontotemporal craniotomy. Right parasellar aneurysm clip. Mild motion artifact degrades image quality. No significant swelling of the facial or scalp tissues is evident.     1.  Temporal evolution and subtotal resolution of previously identified multicompartment hyperdense blood products. Specifically, subtotal resolution of intraparenchymal hemorrhage deep white matter right frontal lobe, intraventricular  hemorrhage and of extra-axial blood products overlying the right frontotemporal level deep to the craniotomy site. No new or progressive hemorrhage is identified 2.  Temporal evolution of presumed subacute phase nonhemorrhagic ischemic change in the right frontal lobe and posterior left frontal paramedian region. No new or progressive areas of recent ischemia are suggested. 3.  Stable ventricular caliber. 4.  Additional details above.    CT Head w/o Contrast    Result Date: 6/16/2021  EXAM: CT HEAD WO CONTRAST LOCATION: [facility] DATE/TIME: 6/16/2021 11:26 AM INDICATION: follow up on the size of the ventricles COMPARISON: CT 06/14/2021. TECHNIQUE: Routine CT Head without IV contrast. Multiplanar reformats. Dose reduction techniques were used. FINDINGS: INTRACRANIAL CONTENTS: Again seen are postop changes from right frontotemporal craniotomy with aneurysm clip in the right parasellar region. Areas of stable low-density change in the anterior right temporal lobe and base of the right frontal lobe. Stable  mixed density thin extra-axial fluid collection deep to the craniotomy flap. Again seen are small scattered areas of residual subarachnoid hemorrhage in the cerebral sulci. Again seen are small amounts of blood products layering in the occipital horns of the lateral ventricles. These subarachnoid and intraventricular blood products have decreased mildly from 06/14/2021 with no new subarachnoid or intraventricular hemorrhage. Again seen is a broad recent acute-subacute infarct along the anterior paramedian right frontal lobe with stable localized anterior mass effect. A trace of petechial blood products has developed in the center of this infarct since 06/14/2021. No space-occupying hemorrhage. Stable presumed subacute infarct along the paramedian left posterior frontal lobe near the vertex. Again seen is some edema associated with a previous left frontal shunt tube catheter tract with  decreased density of the associated blood products in this region and the interval. Stable mild enlargement of the lateral and third ventricles from 06/14/2021. Stable midline and normal caliber fourth ventricle. VISUALIZED ORBITS/SINUSES/MASTOIDS: No intraorbital abnormality. Mild mucosal thickening scattered about the paranasal sinuses. No middle ear or mastoid effusion. BONES/SOFT TISSUES: No acute abnormality.     1.  No significant change in the size of the mildly enlarged lateral and third ventricles from CT 06/14/2021. Slight decrease in the intraventricular hemorrhage in the occipital horns in the interval. 2.  Again seen is a broad area of evolving infarction involving the anterior paramedian right frontal lobe similar in size to the CT 06/14/2021. A small amount of petechial blood products have developed in the central aspect of this infarct since 06/14/2021. No space-occupying hemorrhage. Continued follow-up recommended. 3.  Stable small subacute infarct along the paramedian superior left frontal lobe posteriorly. 4.  Again seen are low density changes associated with a previous left frontal approach ventricular catheter track with decrease in the density of blood products along the catheter track in the interval. 5.  Slight decrease in the scattered subarachnoid hemorrhage in the cerebral sulci in the interval. No definite new subarachnoid hemorrhage. 6.  Stable postop changes right frontotemporal craniotomy with aneurysm clip in the right parasellar region with stable mixed density extra-axial fluid and blood products deep to the craniectomy site.    EEG Awake or Drowsy Routine    Result Date: 6/26/2021  EEG report DATE 06/26/21 CLINICAL HISTORY This is a 70 y.o. male with confusion. The EEG is done to look for underlying epilepsy. INTRODUCTION This is a routine EEG performed utilizing standard 10- 20 system of electrode placement with 20 channels of recording including one channel of EKG monitor. The  patient was studied in awake and drowsy state. Photic stimulation was done. EEG TIME: 29 min DESCRIPTION OF THE RECORD The EEG background consists of a delta frequency background for the right cerebral hemisphere.  The left frontal cerebral hemisphere shows a theta frequency background with a delta frequency in the frontal region.  No convincing sharp activities to suggest electrographic seizures were seen during the recording.  No significant background change with photic stimulation.  Moderate background dysrhythmias is present. IMPRESSION/REPORT/PLAN This is an abnormal EEG during wakefulness and drowsiness.  A generalized slow background could be suggestive of an encephalopathy.  Focal slowness in the bifrontal region and generalized slowness in the right cerebral hemisphere could suggest cerebral dysfunction in these regions such as from a structural lesion.  No electrographic seizures were seen during the recording.  Further clinical correlation is needed. Please note that the absence of epileptiform abnormalities does not exclude the possibility of epilepsy in any patient.     Total Time Spent 40  minutes with >50% of the time spent in chart review, evaluation, management, discussion with patient's nurse at bedside.

## 2021-07-14 NOTE — PLAN OF CARE
Problem: Adult Inpatient Plan of Care  Goal: Absence of Hospital-Acquired Illness or Injury  Intervention: Prevent Skin Injury  Recent Flowsheet Documentation  Taken 7/14/2021 0613 by Kristal Jaramillo RN  Body Position:    left    turned    heels elevated  Taken 7/14/2021 0402 by Kristal Jaramillo RN  Body Position:    turned    supine  Taken 7/14/2021 0220 by Kristal Jaramillo RN  Body Position:    right    turned  Taken 7/14/2021 0007 by Kristal Jaramillo RN  Body Position:    left    turned    heels elevated     Slept 5-6 hours the whole night, no signs of pain or discomfort. No attempt of pulling tubes though once in a while does touching and scratching face and neck, monitored for safety.

## 2021-07-14 NOTE — PROGRESS NOTES
[facility] Pulmonary Medicine - Progress Note  7/14/2021     Assessment:  Principal Problem:    SAH (subarachnoid hemorrhage) (H)  Active Problems:    Acute respiratory failure with hypoxia (H)    ESBL (extended spectrum beta-lactamase) producing bacteria infection    Attention to tracheostomy (H)    Acute and chronic respiratory failure with hypoxia (H)    On mechanically assisted ventilation (H)    Encounter for palliative care    Encephalopathy    70 y.o. 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 Course c/b bilateral BAYLEE infarct with vasospasm. Extubated on 6/3/2021 and re-intubated on 6/6/2021 due to hypoxemia. On 6/7/2021, patient underwent tracheostomy and PEG tube placement. Currently being treated for PsA VAP.    Recommendations/Plans:    Continue phase 2 wean as tolerated    Ceftazidime/avibactam + metronidazole per ID    Bronchial hygiene w/ DuoNeb + HS nebs; added Mucomyst BID -- anticipate a prolonged course w/ increased secretions even as pneumonia is treated (I discussed this w/ family on 7/12)    Trach: 7 bivona, placed 6/7/21, most recently changed 7/05    SLP following, appreciate recommendations -- per RT there has been a significant increase in secretions that needed to be suctioned after patient was on PMV for several hours (improved once PMV was removed & cuff re-inflated); consistent w/ aspiration issues    Discussed my impressions and recommendations for care with RT.    Nia Bains MD  Pulmonary and Critical Care      -------------    Subjective  No acute events overnight. Remains on TM. Continues to require frequent suctioning. Multidisciplinary notes reviewed.    I was able to update both wife & daughter on the ipad in the room.    Ventilation Mode: CMV/AC  (Continuous Mandatory Ventilation/ Assist Control)  FiO2 (%): 28  "%  Rate Set (breaths/minute): 14 breaths/min  Tidal Volume Set (mL): 450 mL  PEEP (cm H2O): 5 cmH2O  Oxygen Concentration (%): 35 %  Resp: 22    Tracheal secretions: frequent, moderate to large    Current phase of ventilator weaning pathway:  Phase 2  Ventilator weaning results from yesterday: TM 12 hours, vent at night    Physical Exam:  /84 (BP Location: Left arm)   Pulse 87   Temp 98.2  F (36.8  C) (Oral)   Resp 22   Ht 1.803 m (5' 10.98\")   Wt 63.7 kg (140 lb 7 oz)   SpO2 92%   BMI 19.60 kg/m      GEN: NAD, resting in bed  HEENT: MMM, trach in place  CVS: regular rhythm, no murmurs  RESP: clear in anterior lung fields, rhonchi improved over the last 24 hours  ABD: Soft, No abdominal pain with palpation   SKIN: Warm extremities, no visible rashes  EXT: warm, no edema  NEURO:  Grossly intact    Pertinent Labs: Lab Results: personally reviewed.     Pertinent Radiology: personally reviewed, including Radiology interpretations (as below):  CXR, 7/09:  Tracheostomy tube projects over tracheal air column. Increased discoid atelectasis at the left lung base. No other new focal consolidation. No pneumothorax or pleural effusion. Cardiac silhouette within normal limits. Stable tortuous aorta.    "

## 2021-07-14 NOTE — PROGRESS NOTES
[facility] Hospitalist Daily Progress Note   Brief summary:  71 yo M with h/o HTN who presented to ED on 5/15/2021 with acute SAH after a fall with unresponsiveness.  He underwent emergent left external ventricular drain placement.  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  On 5/28/2021, patient had persistent cerebral vasospasm, CT head showed bilateral BAYLEE infarct and was treated with milrinone and verapamil.  On 6/1/2021, angiogram showed no evidence of vasospasm.  Extubated on 6/3/2021.  6/6/2021 he was reintubated due to hypoxia.  On 6/7/2021, patient underwent tracheostomy and PEG tube placement.  EVD removed 6/9/2021.  Unasyn for possible pneumonia on 5/20/2021 which was switched to ceftriaxone  for sputum culture growing Klebsiella.  On 5/23, sputum culture also grew Pseudomonas so switched to Zosyn.  Due to increased WBCs in CSF possibility of ventriculitis was raised however CSF culture was negative but antibiotic was switched to cefepime and vancomycin on 5/28/2021 for 2 weeks.  Vanco was discontinued on 6/9/2021.  On 6/5/2021, sputum grew ESBL so cefepime was switched to meropenem.  He was transferred to [facility] on 6/11/21. Repeat sputum culture 6/19 showed MDR pseudomonas and he was started on tobramycin nebs from 6/24/2021 - 7/9/2021 . He has persistent tracheal secretions.     6/14/21 CT head done for fatigue- read as slight increase in caliber of lateral and third ventricle with possibility of developing communicating hydrocephalus. Discussed with Dr. Casillas (neurosurgeon at [facility]), who did not think there was much change, so repeat CT head on 6/16/21 was done which did not show any change.     7/8/2021 aspirated tube feeds.  Spiked a fever that evening 102.8 and WBC went up to 36.9 the next morning so ID reconsulted and has been started on ceftazadime-avibactam along with flagyl on 7/9.      RT and  pulmonary are following and pt is on phase 2 weaning with TM/PMV during day and full vent at night. Vent weaning slowed due to poor cough and secretions.Mental status is slowly improving with ritalin.Tolerating tube feeds    He has severe debility and malnourishment and is progressing slowly and appears malnourished, weakness is major factor in slow progression and at risk for set backs.     Assessment/Plan     Principal Problem:    SAH (subarachnoid hemorrhage) (H)  Active Problems:    Acute respiratory failure with hypoxia (H)    ESBL (extended spectrum beta-lactamase) producing bacteria infection    Attention to tracheostomy (H)    Acute and chronic respiratory failure with hypoxia (H)    On mechanically assisted ventilation (H)    Encounter for palliative care    Encephalopathy    Metabolic encephalopathy    Idiopathic hypertension    Oropharyngeal dysphagia    Moderate protein-calorie malnutrition (H)    Pseudomonas aeruginosa infection    Acute tracheobronchitis    Cerebrovascular accident (CVA) due to occlusion of cerebral artery (H)    Aspiration pneumonia due to gastric secretions, unspecified laterality, unspecified part of lung (H)        Sepsis due to aspiration pneumonia  -Pt spiked fever with tachycardia on evening of 7/8  -Likely aspirated feeding formula as tube feeds suctioned from trach  -CXR early 7/9 didn't show any signs of aspiration but may be too early to see changes.  -Procalcitonin mildly elevated, blood cx NG. Respiratory cx shows PML.   -Abd xray shows percutaneous gastrostomy, no tube internally  -Ceftazidime-avibactam with flagyl started 7/9/2021 per ID    Hypernatremia - resolved..     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   Continue pulmonary toilet, scheduled nebs     Recent subarachnoid hemorrhage  Presented with fall and unresponsiveness on 5/15/2021.  S/p EVD, angiogram showing ruptured posterior communicating aneurysm followed by craniotomy with clipping of PCOM aneurysm on the same day.  EVD removed on 6/9/2021.   Postoperative period was complicated by cerebral vasospasm, bilateral BAYLEE infarct. MRI done on 5/28 showed right frontal parietal, temopral and left parasagittal parietal lobes infarcts.   On 6/1/2021, angiogram showed no vasospasm.  Will need to follow up with Dr. Martinez in Neurosurgery clinic in 3 months (mid august) with repeat CTA of head and neck for post-operative follow up     Metabolic encephalopathy: multifactorial  On 1:1 and off restraints per family request as the mitts dont fit him well.  CT head 7/1 no new changes  Per discussion with neurology (7/1/21) and NSG (7/7/21), pt started on trial of ritalin, on 10mg AM and noon which seems to help.      Anemia - likely due to anemia of chronic disease at this point,  Hb stable around 8-9.     Hypertension- elevated.  Increase PTA lisinopril dose to 40mg today, hydralazine prn     Oropharyngeal dysphagia  On tube feeding via PEG tube, dietitian, speech following.      Inflamed possibly sebaceous cyst, left inguinal area,   Keflex discontinued - now on Abx as above per ID.  Needs outpatient Surgery evaluation for removal - can follow up for both inguinal hernia and cyst as outpatient     Moderate protein-calorie malnutrition  On tube feeding  RD following     Left inguinal hernia- Monitoring  Needs outpatient evaluation     Severe debility, weakness, critical illness, deconditioning, Continue PT/OT     DVT prophylaxis: Heparin sq  GI prophylaxis: Pepcid      Subjective:   Chart reviewed and events noted.  Patient seen and examined.   Pt comfortable,  mouthing words and answering appropriately. Sitter in room. Denies any pain or sob. D/w wife over the ipad in the room.       ROS  Limited, please see above.     Objective:  Vital signs     Temp:  [98.1  F (36.7  C)-100.1  F (37.8  C)] 98.1  F (36.7  C)  Heart Rate:  [] 96  Resp:  [17-33] 17  BP: (117-147)/(64-89) 147/78  FiO2 (%):  [35 %-40 %] 35 %     Weight     Scheduled Meds:    ceftazidime-avibactam (AVYCAZ) IVPB  2.5 g Intravenous Q8H     famotidine  20 mg Enteral Tube BID     guar gum  1 packet Enteral Tube TID     heparin (PF) ANTICOAGULANT  5,000 Units Subcutaneous Q8H FIXED TIMES     ipratropium-albuteroL  3 mL Nebulization Q6H - RT     lisinopriL  20 mg Enteral Tube Daily     menthol-zinc oxide   Topical TID     methylphenidate HCl  10 mg Oral 2 times per day     metroNIDAZOLE  500 mg Intravenous Q12H     nystatin  5 mL Swish & Swallow QID     sodium chloride  3 mL Nebulization BID     sodium chloride  3 mL Intravenous Line Care      Continuous Infusions:   PRN Meds:.acetaminophen **OR** acetaminophen, alteplase, bisacodyL, dextrose 50 % (D50W), docusate sodium, glucagon (human recombinant), hydrALAZINE, loperamide, magnesium hydroxide, naloxone **OR** naloxone **OR** naloxone **OR** naloxone, ondansetron, polyethylene glycol, sodium chloride 0.9%, sodium chloride bacteriostatic, sodium chloride  Intake/Output     I/O last 3 completed shifts:  In: 1813 [I.V.:3; NG/GT:1575; IV Piggyback:235]  Out: 625 [Urine:625]     Physical Exam:        General Appearance:Chronically ill appearing , NAD  HEENT- NC  Neck- Trach+   Lungs: CTA  Cardiovascular: RRR  Abdomen: Soft, non distended  Extremities:  No edema  Neurologic: Awake, alert. Mouthing words and answering appropriately.        Imaging: I have independently reviewed reports.     Lab Results: I have independently reviewed lab results.     All medication issues identified within the last 24 hours have been addressed and completed as  appropriate.     Barriers to disposition:  Vent and trach weaning, sepsis work up  Expected discharge- Several days.     Discussed with RN, SW and patients spouse over the ipad in the room     Kim Knox MD  Hospitalist

## 2021-07-14 NOTE — PLAN OF CARE
"  Problem: Device-Related Complication Risk (Mechanical Ventilation, Invasive)  Goal: Optimal Device Function  Outcome: No Change     Problem: Skin and Tissue Injury (Mechanical Ventilation, Invasive)  Goal: Absence of Device-Related Skin and Tissue Injury  Outcome: No Change     Problem: Ventilator-Induced Lung Injury (Mechanical Ventilation, Invasive)  Goal: Absence of Ventilator-Induced Lung Injury  Outcome: No Change     Problem: Communication Impairment (Mechanical Ventilation, Invasive)  Goal: Effective Communication  Outcome: Improving     Problem: Inability to Wean (Mechanical Ventilation, Invasive)  Goal: Mechanical Ventilation Liberation  Outcome: Improving   RT PROGRESS NOTE     DATA:     CURRENT SETTINGS:             TRACH TYPE / SIZE:  #7 bivona changed 7/5             MODE:   AC 14, 450, peep 5, 35%                 ACTION:             THERAPIES:   albuterol q6h, saline bid             SUCTION:                           FREQUENCY:   x5                        AMOUNT:   small to copious                        CONSISTENCY:   thick to thin                        COLOR:   white             SPONTANEOUS COUGH EFFORT/STRENGTH OF EFFORT (not elicited by suctioning): weak to strong                              WEANING PHASE:   2                        WEAN MODE:    28% tm with 10L flow and cuff inflated                        WEAN TIME:   started 08:45                            RESPONSE:             BS:   coarse             VITAL SIGNS:   Blood pressure 137/84, pulse 87, temperature 98.2  F (36.8  C), temperature source Oral, resp. rate 22, height 1.803 m (5' 10.98\"), weight 63.7 kg (140 lb 7 oz), SpO2 92 %.                 EMOTIONAL NEEDS / CONCERNS:  no                RISK FOR SELF DECANNULATION:  yes                        RISK DUE TO:  confusion                        INTERVENTION/S IN PLACE IS/ARE:  1:1 sitter       NOTE / PLAN:   Continue pulmonary toilet.    "

## 2021-07-14 NOTE — PLAN OF CARE
Problem: Device-Related Complication Risk (Mechanical Ventilation, Invasive)  Goal: Optimal Device Function  Outcome: Improving     Problem: Inability to Wean (Mechanical Ventilation, Invasive)  Goal: Mechanical Ventilation Liberation  Outcome: Improving     Problem: Ventilator-Induced Lung Injury (Mechanical Ventilation, Invasive)  Goal: Absence of Ventilator-Induced Lung Injury  Outcome: Improving  RT PROGRESS NOTE     DATA:     CURRENT SETTINGS:  14/450/+5             TRACH TYPE / SIZE: #7 Bivona (Placed 07/05)             MODE:  AC             FIO2:   35%     ACTION:             THERAPIES: Duo-neb Q6/Saline BID             SUCTION: Yes                         FREQUENCY: 4x                         AMOUNT:   Moderate                         CONSISTENCY: Thick (Lavage x1)                        COLOR:  Pale yellow             SPONTANEOUS COUGH EFFORT/STRENGTH OF EFFORT (not elicited by suctioning): Strong productive cough.                              WEANING PHASE:   2                        WEAN MODE:  HFTM                        WEAN TIME:  12 hours and 22 minutes                        END WEAN REASON: Full vent at noc     RESPONSE:             BS:   Coarse             VITAL SIGNS: Sat 96-99%, HR 78-96, RR 15-27             EMOTIONAL NEEDS / CONCERNS:  None                RISK FOR SELF DECANNULATION:  Yes                        RISK DUE TO:  Confusion                        INTERVENTION/S IN PLACE IS/ARE: 1:1 Staff       NOTE / PLAN:     Pt remains on full vent and tolerating well with no distress.  Continue current care plan.

## 2021-07-14 NOTE — PROGRESS NOTES
"Palliative Care Social Work Note    Assessment   Met with pt and colleague SISI Alvarez. Pt's wife Susy and dtr Yolanda were on the IPAD. Matt was up in the chair and appeared more alert. He nodded his head yes several times when asked questions and appeared to be tracking the conversation. Wife and dtr expressed frustration with Matt not progressing and feel like \"every day is the same.\" Acknowledged their frustrations and also their advocacy for Matt. They have talked with Patient Advocate and Nursing Supervisor about some of their concerns. Today, Susy's biggest concern is that Matt's mucus is not improving and she would like to know what else can be done to improve that. Allowed time for them to share their feelings and experiences. Also acknowledged Matt's efforts to get better and that his family is wanting that for him. Susy told Matt that they are \"fighting for him and love him.\"     Clinical Social interventions  Active/empathetic listening  Validation of feelings   Emotional support  Processing of emotions    Plan of care  PC SW remains available to provide psychosocial/emotional support to pt and family.     Josephine Dailey, [facility]  Palliative Care   Office # [phone]  "

## 2021-07-14 NOTE — PLAN OF CARE
Problem: Adult Inpatient Plan of Care  Goal: Patient-Specific Goal (Individualized)  Outcome: Improving     Problem: Anxiety  Goal: Anxiety Reduction or Resolution  Outcome: Improving  Pt is able to follow a few simple commands.Pt remains on a 1:1 for safety. Made few attempts to pull on his trach but was successfully redirected. Got up to chair during shift and he tolerated it well.

## 2021-07-15 ENCOUNTER — APPOINTMENT (OUTPATIENT)
Dept: SPEECH THERAPY | Facility: CLINIC | Age: 71
DRG: 207 | End: 2021-07-15
Attending: HOSPITALIST
Payer: COMMERCIAL

## 2021-07-15 ENCOUNTER — HOSPITAL ENCOUNTER (INPATIENT)
Dept: RADIOLOGY | Facility: CLINIC | Age: 71
DRG: 207 | End: 2021-07-15
Attending: INTERNAL MEDICINE | Admitting: INTERNAL MEDICINE
Payer: COMMERCIAL

## 2021-07-15 ENCOUNTER — APPOINTMENT (OUTPATIENT)
Dept: OCCUPATIONAL THERAPY | Facility: CLINIC | Age: 71
DRG: 207 | End: 2021-07-15
Attending: HOSPITALIST
Payer: COMMERCIAL

## 2021-07-15 ENCOUNTER — APPOINTMENT (OUTPATIENT)
Dept: PHYSICAL THERAPY | Facility: CLINIC | Age: 71
DRG: 207 | End: 2021-07-15
Attending: HOSPITALIST
Payer: COMMERCIAL

## 2021-07-15 PROCEDURE — 999N000009 HC STATISTIC AIRWAY CARE

## 2021-07-15 PROCEDURE — 250N000009 HC RX 250: Performed by: INTERNAL MEDICINE

## 2021-07-15 PROCEDURE — 250N000011 HC RX IP 250 OP 636

## 2021-07-15 PROCEDURE — 250N000013 HC RX MED GY IP 250 OP 250 PS 637: Performed by: HOSPITALIST

## 2021-07-15 PROCEDURE — 120N000018 HC R&B RESPIRATORY CARE WITH ATTENDANT

## 2021-07-15 PROCEDURE — 94664 DEMO&/EVAL PT USE INHALER: CPT

## 2021-07-15 PROCEDURE — 94003 VENT MGMT INPAT SUBQ DAY: CPT | Performed by: INTERNAL MEDICINE

## 2021-07-15 PROCEDURE — 999N000157 HC STATISTIC RCP TIME EA 10 MIN

## 2021-07-15 PROCEDURE — 92507 TX SP LANG VOICE COMM INDIV: CPT | Mod: GN

## 2021-07-15 PROCEDURE — 250N000013 HC RX MED GY IP 250 OP 250 PS 637

## 2021-07-15 PROCEDURE — 92526 ORAL FUNCTION THERAPY: CPT | Mod: GN

## 2021-07-15 PROCEDURE — 97530 THERAPEUTIC ACTIVITIES: CPT | Mod: GP | Performed by: PHYSICAL THERAPIST

## 2021-07-15 PROCEDURE — 94640 AIRWAY INHALATION TREATMENT: CPT

## 2021-07-15 PROCEDURE — 250N000013 HC RX MED GY IP 250 OP 250 PS 637: Performed by: INTERNAL MEDICINE

## 2021-07-15 PROCEDURE — 250N000011 HC RX IP 250 OP 636: Performed by: INTERNAL MEDICINE

## 2021-07-15 PROCEDURE — 71045 X-RAY EXAM CHEST 1 VIEW: CPT

## 2021-07-15 PROCEDURE — 97110 THERAPEUTIC EXERCISES: CPT | Mod: GO | Performed by: OCCUPATIONAL THERAPIST

## 2021-07-15 PROCEDURE — 97530 THERAPEUTIC ACTIVITIES: CPT | Mod: GO | Performed by: OCCUPATIONAL THERAPIST

## 2021-07-15 PROCEDURE — 94640 AIRWAY INHALATION TREATMENT: CPT | Mod: 76

## 2021-07-15 PROCEDURE — 97112 NEUROMUSCULAR REEDUCATION: CPT | Mod: GP | Performed by: PHYSICAL THERAPIST

## 2021-07-15 PROCEDURE — 94003 VENT MGMT INPAT SUBQ DAY: CPT

## 2021-07-15 RX ORDER — METHYLPHENIDATE HYDROCHLORIDE 10 MG/1
10 TABLET ORAL
Status: DISCONTINUED | OUTPATIENT
Start: 2021-07-16 | End: 2021-08-26

## 2021-07-15 RX ORDER — GLYCOPYRROLATE 1 MG/1
1 TABLET ORAL 3 TIMES DAILY
Status: DISCONTINUED | OUTPATIENT
Start: 2021-07-15 | End: 2021-07-20

## 2021-07-15 RX ORDER — FUROSEMIDE 10 MG/ML
40 INJECTION INTRAMUSCULAR; INTRAVENOUS EVERY 8 HOURS
Status: COMPLETED | OUTPATIENT
Start: 2021-07-15 | End: 2021-07-16

## 2021-07-15 RX ORDER — VENLAFAXINE 25 MG/1
25 TABLET ORAL
Status: DISCONTINUED | OUTPATIENT
Start: 2021-07-15 | End: 2021-07-20

## 2021-07-15 RX ORDER — MIRTAZAPINE 7.5 MG/1
7.5 TABLET, FILM COATED ORAL AT BEDTIME
Status: DISCONTINUED | OUTPATIENT
Start: 2021-07-15 | End: 2021-09-17

## 2021-07-15 RX ADMIN — NYSTATIN 500000 UNITS: 100000 SUSPENSION ORAL at 09:35

## 2021-07-15 RX ADMIN — CEFTAZIDIME, AVIBACTAM 2.5 G: 2; .5 POWDER, FOR SOLUTION INTRAVENOUS at 01:19

## 2021-07-15 RX ADMIN — IPRATROPIUM BROMIDE AND ALBUTEROL SULFATE 3 ML: .5; 3 SOLUTION RESPIRATORY (INHALATION) at 20:31

## 2021-07-15 RX ADMIN — VENLAFAXINE 25 MG: 25 TABLET ORAL at 16:21

## 2021-07-15 RX ADMIN — ACETYLCYSTEINE 1 ML: 200 SOLUTION ORAL; RESPIRATORY (INHALATION) at 07:32

## 2021-07-15 RX ADMIN — IPRATROPIUM BROMIDE AND ALBUTEROL SULFATE 3 ML: .5; 3 SOLUTION RESPIRATORY (INHALATION) at 01:03

## 2021-07-15 RX ADMIN — FAMOTIDINE 20 MG: 20 TABLET, FILM COATED ORAL at 21:42

## 2021-07-15 RX ADMIN — FUROSEMIDE 40 MG: 10 INJECTION, SOLUTION INTRAMUSCULAR; INTRAVENOUS at 17:43

## 2021-07-15 RX ADMIN — CEFTAZIDIME, AVIBACTAM 2.5 G: 2; .5 POWDER, FOR SOLUTION INTRAVENOUS at 18:30

## 2021-07-15 RX ADMIN — GLYCOPYRROLATE 1 MG: 1 TABLET ORAL at 21:42

## 2021-07-15 RX ADMIN — METRONIDAZOLE 500 MG: 500 INJECTION, SOLUTION INTRAVENOUS at 09:36

## 2021-07-15 RX ADMIN — ACETAMINOPHEN ORAL SOLUTION 650 MG: 650 SOLUTION ORAL at 01:19

## 2021-07-15 RX ADMIN — IPRATROPIUM BROMIDE AND ALBUTEROL SULFATE 3 ML: .5; 3 SOLUTION RESPIRATORY (INHALATION) at 07:32

## 2021-07-15 RX ADMIN — IPRATROPIUM BROMIDE AND ALBUTEROL SULFATE 3 ML: .5; 3 SOLUTION RESPIRATORY (INHALATION) at 14:18

## 2021-07-15 RX ADMIN — NYSTATIN 500000 UNITS: 100000 SUSPENSION ORAL at 13:10

## 2021-07-15 RX ADMIN — Medication 1 PACKET: at 21:44

## 2021-07-15 RX ADMIN — METHYLPHENIDATE HYDROCHLORIDE 10 MG: 10 TABLET ORAL at 13:09

## 2021-07-15 RX ADMIN — HEPARIN SODIUM 5000 UNITS: 5000 INJECTION, SOLUTION INTRAVENOUS; SUBCUTANEOUS at 21:42

## 2021-07-15 RX ADMIN — SODIUM CHLORIDE 30 MG/ML INHALATION SOLUTION 3 ML: 30 SOLUTION INHALANT at 07:32

## 2021-07-15 RX ADMIN — Medication 1 PACKET: at 13:09

## 2021-07-15 RX ADMIN — ANORECTAL OINTMENT: 15.7; .44; 24; 20.6 OINTMENT TOPICAL at 09:36

## 2021-07-15 RX ADMIN — CEFTAZIDIME, AVIBACTAM 2.5 G: 2; .5 POWDER, FOR SOLUTION INTRAVENOUS at 09:34

## 2021-07-15 RX ADMIN — METHYLPHENIDATE HYDROCHLORIDE 10 MG: 10 TABLET ORAL at 09:35

## 2021-07-15 RX ADMIN — HEPARIN SODIUM 5000 UNITS: 5000 INJECTION, SOLUTION INTRAVENOUS; SUBCUTANEOUS at 13:10

## 2021-07-15 RX ADMIN — METRONIDAZOLE 500 MG: 500 INJECTION, SOLUTION INTRAVENOUS at 21:34

## 2021-07-15 RX ADMIN — ANORECTAL OINTMENT: 15.7; .44; 24; 20.6 OINTMENT TOPICAL at 13:11

## 2021-07-15 RX ADMIN — HEPARIN SODIUM 5000 UNITS: 5000 INJECTION, SOLUTION INTRAVENOUS; SUBCUTANEOUS at 05:32

## 2021-07-15 RX ADMIN — Medication 1 PACKET: at 09:39

## 2021-07-15 RX ADMIN — GLYCOPYRROLATE 1 MG: 1 TABLET ORAL at 13:15

## 2021-07-15 RX ADMIN — FAMOTIDINE 20 MG: 20 TABLET, FILM COATED ORAL at 09:35

## 2021-07-15 RX ADMIN — SODIUM CHLORIDE 30 MG/ML INHALATION SOLUTION 3 ML: 30 SOLUTION INHALANT at 20:32

## 2021-07-15 RX ADMIN — ANORECTAL OINTMENT: 15.7; .44; 24; 20.6 OINTMENT TOPICAL at 21:44

## 2021-07-15 RX ADMIN — MIRTAZAPINE 7.5 MG: 7.5 TABLET ORAL at 21:58

## 2021-07-15 RX ADMIN — NYSTATIN 500000 UNITS: 100000 SUSPENSION ORAL at 16:21

## 2021-07-15 RX ADMIN — NYSTATIN 500000 UNITS: 100000 SUSPENSION ORAL at 21:42

## 2021-07-15 RX ADMIN — LISINOPRIL 40 MG: 20 TABLET ORAL at 18:30

## 2021-07-15 RX ADMIN — ACETYLCYSTEINE 2 ML: 200 SOLUTION ORAL; RESPIRATORY (INHALATION) at 20:31

## 2021-07-15 ASSESSMENT — MIFFLIN-ST. JEOR: SCORE: 1431.44

## 2021-07-15 NOTE — PROGRESS NOTES
[facility]    Medicine Progress Note - Hospitalist Service       Date of Admission:  6/11/2021    Identifier:  70-year-old retired man who lived with his wife at home prehospitalization, who originally presented to [facility] on May 15, 2021 for altered mental state he had sustained a fall and was found to be unresponsive by his wife.  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  He was treated with milrinone and verapamil.  He was successfully extubated on Pamela 3 and on June 6 he was reintubated due to drop in his saturations and underwent tracheostomy placement as well as feeding tube placement on June 7.  June 9: EVD was removed.  He was transferred to LTAC on June 11  In the last 1 month he has not been able to be liberated from the ventilator.  He has had repeated bouts of Klebsiella infection in sputum and ventilator associated pneumonia.  Additionally he has had a large amount of tracheal secretions, significant cognitive impairment, left-sided neglect and ongoing episodes of agitation.  Patient's family has been monitoring the patient continuously during the daytime hours via video monitor and visit with him in person over the weekends.  Multiple specialties have been involved in the care of this patient including palliative care, infectious disease pulmonary medicine and neurology amongst others.    Overnight patient continues to be restless and agitated.  And gets less sleep        Assessment & Plan           1/.  Acute hypoxemic respiratory failure due to prolonged hospitalization, also because of significant intracranial hemorrhage.  One of the  biggest barriers to decannulation is related to the issue of excessive secretions and these need to be addressed appropriately.  We will add glycopyrrolate 1 mg 3 times a day and uptitrate as appropriate.  2/.  Insomnia: Definitely making matters worse for the patient as I review his medications he is on Ritalin twice a day, getting Ritalin at bedtime is probably not the best idea for treatment of insomnia.  We will discontinue Ritalin at that time we will keep him at only once a day in the morning.  We will treat him with nighttime mirtazapine 7.5 mg scheduled.  Can be uptitrated as appropriate.  3/.  Traumatic brain injury/intracranial hemorrhage: Will need to be on Effexor 25 mg 3 times daily which should definitely address matters in a meaningful way.  4/.  Dysphagia: Secondary to respiratory failure continue with tube feeds at this time.  5/.  Ventilator associated pneumonia appreciate intervention and recommendations by infectious disease antibiotics to continue till July 18.  6/.  Prognosis and logistics: This is challenge because patient's wife is definitely having a difficult time with the situation and recognizing that the patient is unlikely going to be his prehospitalization self.  She probably does know it but is unwilling to accept the reality which is unfortunate.  Care progression conference would be mandatory and very beneficial I would recommend having it in person versus via video.  I would encourage patient's daughter who is also involved in the care to be present for the same.         Diet: Adult Formula Drip Feeding: Continuous Osmolite 1.5; Gastrostomy; Goal Rate: 90; mL/hr; Medication - Feeding Tube Flush Frequency: 240 mL q 4 hours water flushes; Amount to Send (Nutrition use): send 3 packets Nutrisource Fiber daily; Tube Feeding...    DVT Prophylaxis: Heparin SQ  Gutierrez Catheter: Not present  Central Lines: None  Code Status: Full Code      Disposition Plan   Expected discharge: Unclear at  this time recommended to transitional care unit once Liberation from the ventilator.     The patient's care was discussed with the Bedside Nurse, Care Coordinator/ and Patient's Family.    Latonya Lemus MD, A  Hospitalist Service  [facility]  Securely message with the Vocera Web Console (learn more here)  Text page via [email or web address] Paging/Directory      ______________________________________________________________________    Interval History   Overnight as mentioned patient continues to be restless tugging and pulling at tubes.  He continues to demonstrate left-sided neglect is very fidgety with his hands.  His wife is quite involved in his care and constantly monitoring the patient and the proceedings in the room via iPad    Data reviewed today: I reviewed all medications, new labs and imaging results over the last 24 hours. I personally reviewed no images or EKG's today.    Physical Exam   Vital Signs: Temp: 98.1  F (36.7  C) Temp src: Oral BP: (!) 130/90 Pulse: 85   Resp: 18 SpO2: 91 % O2 Device: Trach dome Oxygen Delivery: 10 LPM  Weight: 141 lbs 0 oz  General Appearance: Awake sitting in chair, left-sided neglect  Respiratory: Coarse breath sounds tracheostomy in place  Cardiovascular: S1-S2 regular rate and rhythm  GI: Soft nondistended bowel sounds are present feeding tube noted  Skin: No rashes or lesions.  There is a small bump on the dorsum of the left hand which is not red, painful does not involve the wrist joint and is not overlying any IV access  Other: No lower extremity swelling  Patient is moving both upper extremities without any difficulty and constantly fidgeting  Neurological exam cannot be undertaken because of patient being restless    Data   Recent Labs   Lab 07/14/21  0626 07/13/21  1813 07/13/21  1012 07/12/21  1748 07/12/21  0637 07/11/21  1415   WBC  --   --  8.4 10.3  --  13.4*   HGB  --   --  9.7* 9.6*  --  9.1*   MCV  --   --  99 99  --  99   PLT   --   --  285 284  --  260    146* 145  --  148*  --    POTASSIUM 4.1  --  4.4  --  4.3  --    CHLORIDE 105  --  105  --  107  --    CO2 29  --  27  --  31  --    BUN 24*  --  23*  --  24*  --    CR 0.57*  --  0.54*  --  0.57*  --    ANIONGAP 10  --  13  --  10  --    RAGINI 10.5  --  10.7*  --  10.5  --      --  133*  --  132*  --

## 2021-07-15 NOTE — PROGRESS NOTES
[facility] Pulmonary Medicine - Progress Note  7/14/2021     Assessment:  Principal Problem:    SAH (subarachnoid hemorrhage) (H)  Active Problems:    Acute respiratory failure with hypoxia (H)    ESBL (extended spectrum beta-lactamase) producing bacteria infection    Attention to tracheostomy (H)    Acute and chronic respiratory failure with hypoxia (H)    On mechanically assisted ventilation (H)    Encounter for palliative care    Encephalopathy    70 y.o. 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 Course c/b bilateral BAYLEE infarct with vasospasm. Extubated on 6/3/2021 and re-intubated on 6/6/2021 due to hypoxemia. On 6/7/2021, patient underwent tracheostomy and PEG tube placement. Currently being treated for PsA VAP.    Recommendations/Plans:    Continue phase 2 wean as tolerated, continue extending time on TM by 2 hours into the nighttime -- discussed w/ RT    Ceftazidime/avibactam + metronidazole per ID    Bronchial hygiene w/ DuoNeb + HS nebs & Mucomyst -- anticipate a prolonged course w/ increased secretions even as pneumonia is treated (I discussed this w/ family on 7/12)    Trach: 7 bivona, placed 6/7/21, most recently changed 7/05    SLP following, appreciate recommendations -- per RT there has been a significant increase in secretions that needed to be suctioned after patient was on PMV for several hours (improved once PMV was removed & cuff re-inflated); consistent w/ aspiration issues    Discussed my impressions and recommendations for care with RT.    Nia Bains MD  Pulmonary and Critical Care      -------------    Subjective  No acute events overnight. Remains on TM. Continues to require frequent suctioning. Multidisciplinary notes reviewed.    Ventilation Mode: CMV/AC  (Continuous Mandatory Ventilation/ Assist Control)  FiO2 (%): 28  "%  Rate Set (breaths/minute): 14 breaths/min  Tidal Volume Set (mL): 450 mL  PEEP (cm H2O): 5 cmH2O  Oxygen Concentration (%): 30 %  Resp: 20    Tracheal secretions: frequent, moderate to large    Current phase of ventilator weaning pathway:  Phase 2  Ventilator weaning results from yesterday: TM 12 hours, vent at night    Physical Exam:  BP (!) 133/90 (BP Location: Left arm)   Pulse 80   Temp 98.4  F (36.9  C) (Oral)   Resp 20   Ht 1.803 m (5' 10.98\")   Wt 64 kg (141 lb)   SpO2 99%   BMI 19.67 kg/m      GEN: NAD, resting in bed  HEENT: MMM, trach in place  CVS: regular rhythm, no murmurs  RESP: clear in anterior lung fields, rhonchi improved over the last 24 hours  ABD: Soft, No abdominal pain with palpation   SKIN: Warm extremities, no visible rashes  EXT: warm, no edema  NEURO:  Turns to examiner, frequently fiddling w/ one of the foam blocks, moving it in the air, mouths words intermittently    Pertinent Labs: Lab Results: personally reviewed.     Pertinent Radiology: personally reviewed, including Radiology interpretations (as below):  CXR, 7/15:  No change in tracheostomy tube. Heart and mediastinal size are stable. There is indistinctness of the pulmonary interstitium, representing either airway inflammation or edema. There is bandlike opacity involving the retrocardiac region of the left lung base, increased from prior study and representing either atelectasis or infectious process. No pleural effusion or pneumothorax. There is some mild elevation of the right hemidiaphragm.  7/15      7/09      "

## 2021-07-15 NOTE — PLAN OF CARE
Problem: Adult Inpatient Plan of Care  Goal: Absence of Hospital-Acquired Illness or Injury  Outcome: Improving  Intervention: Identify and Manage Fall Risk  Recent Flowsheet Documentation  Taken 7/15/2021 0940 by Mariam Robles RN  Safety Promotion/Fall Prevention:   safety round/check completed   sitter at bedside  Intervention: Prevent Infection  Recent Flowsheet Documentation  Taken 7/15/2021 0940 by Mariam Robles RN  Infection Prevention: hand hygiene promoted  Goal: Optimal Comfort and Wellbeing  Outcome: Improving     Vital signs stable. Pt alert; denied pain. No PRNs given.  Pt had two large watery BMs this shift. Pt spent time up in chair, visiting with wife via iPad.  All care needs met.

## 2021-07-15 NOTE — PROGRESS NOTES
RT PROGRESS NOTE     DATA:     CURRENT SETTINGS: 14,450, +5             TRACH TYPE / SIZE:  #7 Bivona              MODE:   PRVC/AC             FIO2:   30%     ACTION:             THERAPIES:   DUO-NEB Q6,NaCl, MUCO BID.             SUCTION:                           FREQUENCY:   4                        AMOUNT:   small/moderate                         CONSISTENCY:   thick                        COLOR:   white             SPONTANEOUS COUGH EFFORT/STRENGTH OF EFFORT (not elicited by suctioning):not observed                              WEANING PHASE:   2                        WEAN MODE:    TM, cuff up                         WEAN TIME:  since 0823                        END WEAN REASON:  ongoing     RESPONSE:             BS: coarse             VITAL SIGNS:   SPO2 95-99, HR 77 RR 21-28             EMOTIONAL NEEDS / CONCERNS: yes               RISK FOR SELF DECANNULATION:  yes                        RISK DUE TO:confusion                         INTERVENTION/S IN PLACE IS/ARE: on  1:1 staff supervision        NOTE / PLAN:   Placed pt on 28%tm at 0823, am tolerating well,  Continue to extend trach mask weaning as able.

## 2021-07-15 NOTE — PLAN OF CARE
Problem: Anxiety  Goal: Anxiety Reduction or Resolution  7/15/2021 0643 by Lilly Woodard RN  Outcome: Improving  Pt was awake majority of the shift. Both hands are constantly active.  Sponge were placed in both hands to redirect him from reaching his trach tube and G tube and was helpful. No signs of  pain and discomfort noted. PRN Tylenol given for comfort. Had loose BM X 1. Suctioned  couple of times this shift for small to moderate amount of thick white secretions. Slept for approximately 2 hours on and off.

## 2021-07-15 NOTE — PLAN OF CARE
Problem: Anxiety  Goal: Anxiety Reduction or Resolution  Outcome: Improving  Received pt sleeping  comfortably at 1930. Pt woke up  during  repositioning and cares and has been awake  until now watching TV. Pt attempted to pull his trach ties  when pt dropped   the sponge he was holding. Pt  redirected and handed the sponge back. Pt's hands has been busy with the sponge he was holding in  both hands. Will continue to monitor.

## 2021-07-15 NOTE — PROGRESS NOTES
CLINICAL NUTRITION SERVICES - REASSESSMENT NOTE    RECOMMENDATIONS FOR MD/PROVIDER TO ORDER:     No new   Recommendations Ordered by Registered Dietitian (RD):     Continue TF regimen as ordered    Future/Additional Recommendations:   No current   Malnutrition:   % Weight Loss:  None noted ( gradual decrease of 3% in ~ 1 month noted)  % Intake:  No decreased intake noted- on enteral at goal rate  Subcutaneous Fat Loss:  Orbital region mild depletion  Muscle Loss:  Temporal region moderate depletion and Anterior thigh region mild/moderate depletion  Fluid Retention:  Mild 1+ per nursing    Malnutrition Diagnosis:   Patient does not meet two of the above criteria necessary for diagnosing malnutrition         Diet order: None   Enteral:  Via PEG tube which was placed 6/7/21:CONTINUOUS, Starting on Tue 7/13/21 at 1300, Until Specified  Adult Formula: Osmolite 1.5  Route: Gastrostomy  Goal Rate: 90  Goal Units: mL/hr  Medication - Feeding Tube Flush Frequency: 240 mL q 4 hours water flushes  Amount to Send (Nutrition use): send 3 packets Nutrisource Fiber daily  Additional comments: Tube Feeding Continuous Initial Rate (mL/hr):30  Current enteral at goal rate with modulars provides 2160 mL formula, 3288 kcals, 136 g protein, 453 g CHO, 106 g fat, 9 g fiber, 1645 mL free fluid from formula + 1440 ml from buxdmud=2441 ml/d = 48 ml/kg/d.    Patient remains NPO and on TF with good tolerance, goal rate reached     Total EN volume received:7/11-7/15/21  NG/GT         1761 1733 3284 2928 1789         BM:Diarrhea x 1 7/15/21    SLP: last seen 7/11/21- following    Wounds/WOCN: surgical neck incision noted 6/7/21    Fluid:1+ LE edema per nursing    Weight: 63 kg     Palliative: n/a    Coy nutrition score: 3; total score: 14    I/O last 3 completed shifts:    In: 3497 [NG/GT:3497]    Out: 2280 [Urine:2280]    Labs:    Electrolytes  Potassium (mmol/L)   Date Value   07/14/2021 4.1   07/13/2021 4.4   07/12/2021 4.3   06/11/2021  3.8   06/10/2021 3.7   06/09/2021 3.8     Phosphorus (mg/dL)   Date Value   06/11/2021 2.9   06/10/2021 2.5   06/09/2021 2.5   06/08/2021 2.3 (L)   06/07/2021 2.9        Blood Glucose  Glucose (mg/dL)   Date Value   07/14/2021 124   07/13/2021 133 (H)   07/12/2021 132 (H)   07/09/2021 116   07/07/2021 106   06/11/2021 142 (H)   06/10/2021 134 (H)   06/09/2021 117 (H)   06/08/2021 125 (H)   06/07/2021 103 (H)     Hemoglobin A1C (%)   Date Value   05/15/2021 5.5        Inflammatory Markers  CRP Inflammation (mg/L)   Date Value   06/01/2021 79.0 (H)   05/30/2021 120.0 (H)   05/28/2021 150.0 (H)     WBC (10e9/L)   Date Value   06/11/2021 8.2   06/10/2021 9.4   06/09/2021 10.4     WBC Count (10e3/uL)   Date Value   07/13/2021 8.4   07/12/2021 10.3   07/11/2021 13.4 (H)     Albumin (g/dL)   Date Value   06/14/2021 2.1 (L)   06/02/2021 1.7 (L)   05/26/2021 2.1 (L)   05/23/2021 1.9 (L)        Magnesium (mg/dL)   Date Value   06/11/2021 2.5 (H)   06/10/2021 2.4 (H)   06/09/2021 2.4 (H)     Sodium (mmol/L)   Date Value   07/14/2021 144   07/13/2021 146 (H)   07/13/2021 145   06/11/2021 143   06/10/2021 142   06/09/2021 143        Renal  Urea Nitrogen (mg/dL)   Date Value   07/14/2021 24 (H)   07/13/2021 23 (H)   07/12/2021 24 (H)   06/11/2021 25   06/10/2021 23   06/09/2021 24     Creatinine (mg/dL)   Date Value   07/14/2021 0.57 (L)   07/13/2021 0.54 (L)   07/12/2021 0.57 (L)   06/11/2021 0.57 (L)   06/10/2021 0.52 (L)   06/09/2021 0.49 (L)         Additional  Triglycerides (mg/dL)   Date Value   05/28/2021 91   06/05/2013 178 (H)   09/19/2007 109     Ketones Urine   Date Value   07/01/2021 Negative   06/01/2021 Negative mg/dL        Meds:    acetylcysteine  2 mL Nebulization BID     cefTAZidime-avibactam  2.5 g Intravenous Q8H     famotidine  20 mg Oral or Feeding Tube BID     fiber modular (NUTRISOURCE FIBER)  1 packet Per Feeding Tube TID     heparin ANTICOAGULANT  5,000 Units Subcutaneous Q8H     ipratropium - albuterol  "0.5 mg/2.5 mg/3 mL  3 mL Nebulization Q6H     lisinopril  40 mg Oral or Feeding Tube Daily     menthol-zinc oxide   Topical TID     methylphenidate  10 mg Oral or Feeding Tube BID     metroNIDAZOLE  500 mg Intravenous Q12H     nystatin  500,000 Units Mouth/Throat 4x Daily     sodium chloride  3 mL Nebulization BID     sodium chloride (PF)  3 mL Intracatheter Q8H        sodium chloride        acetaminophen **OR** acetaminophen, alteplase, bisacodyl, [DISCONTINUED] glucose **OR** dextrose **OR** glucagon, docusate **OR** docusate sodium, hydrALAZINE, loperamide, magnesium hydroxide, naloxone **OR** naloxone **OR** naloxone **OR** naloxone, ondansetron, polyethylene glycol, sodium chloride (PF), sodium chloride      Anthropometrics:  Height: 5' 11\" (180.3 cm)   admit Weight: 145 lb 11.2 oz (66.1 kg)  BMI (Calculated)current: 19.67  BMI indication: 18.5-24.9 normal weight  Recent Weights:   07/15/21 0539 64 kg (141 lb) -- AS   07/14/21 0612 63.7 kg (140 lb 7 oz) -- ED   07/12/21 0602 64.9 kg (143 lb) --    07/11/21 0827 64.5 kg (142 lb 3.2 oz) -- AT   07/11/21 0646 64.6 kg (142 lb 8 oz) --    07/10/21 1750 62.8 kg (138 lb 6.4 oz) --    07/10/21 0700 62.8 kg (138 lb 7.2 oz) --    07/10/21 0613 62.8 kg (138 lb 6.4 oz) -- HI   07/09/21 0600 64.5 kg (142 lb 3 oz) -- HI   07/08/21 0650 65 kg (143 lb 4.8 oz) -- HI   07/07/21 0500 65.5 kg (144 lb 8 oz) -- HI   07/06/21 0653 65.9 kg (145 lb 5 oz) -- HI   07/05/21 0602 64.6 kg (142 lb 5 oz) -- HI   07/04/21 0555 65.2 kg (143 lb 11.2 oz) -- HI   07/03/21 0556 63.2 kg (139 lb 7 oz) -- HI   07/02/21 0700 64.7 kg (142 lb 9.6 oz) -- HI   07/01/21 0637 63.7 kg (140 lb 6.4 oz) --        Estimated Nutrition Needs:  Assessment weight is 63 kg, most accurate weight (as of 6/23) (81% IBW of 172 lb)     Energy Needs: 5126-5856 kcals daily, 45-50 kcal/kg (updated 6/28 since pt only maintaining wt at 40-45 kcal/kg)  Protein Needs: + mg daily, 1.5-2+ g/kg   Fluid Needs: " 9511-1977 mls daily, 25-30 mls/kg      Malnutrition: Noted previously-moderate     Nutrition dx:   Swallowing difficulty r/t respiratory failure as evidenced by requiring enteral nutrition     Goal Status:  Meet estimated nutrition needs via TF-progressing  TF at goal rate to meet % of estimated needs  Gain weight (changed 6/28)-not progressing-wt is being maintained  Advance diet-not progressing     Intervention:  Continue TF regimen outlined above  Diet consistency/texture per SLP  Diet advancement per MD discretion    MONITORING AND EVALUATION:  Progress towards goals will be monitored and evaluated per protocol and Practice Guidelines

## 2021-07-15 NOTE — PROGRESS NOTES
Palliative Care Follow Note    I met with Dr. Lemus and discussed my conversation yesterday with patient's wife.  Dr. Cui indicated that patient is quite impaired and that she noted quite a left sided neglect with this patient and that this patient needs to be approached from midline or his right side.  She feels that the patient gets frustrated with overstimulation of the brain and that this should be discussed with her wife to try to have her have an increased understanding of her deficits.  She needs to have a more realistic  understanding of her 's condition. She feels that wife is progressing from denial to grieving over her husbands condition.    I discussed having a care progression meeting and she indicated that she would be happy to participate in a meeting on Tuesday at either 10 AM or noon.  I will call wife because she usually only comes in on weekends.  She does live in [place] but I believe that this type of meeting would be better served in person,     I will call wife and hopefully schedule a meeting. It would be helpful to have Speech and OT attend    SISI Romero, NP-C, [facility]

## 2021-07-16 ENCOUNTER — APPOINTMENT (OUTPATIENT)
Dept: SPEECH THERAPY | Facility: CLINIC | Age: 71
DRG: 207 | End: 2021-07-16
Attending: HOSPITALIST
Payer: COMMERCIAL

## 2021-07-16 ENCOUNTER — APPOINTMENT (OUTPATIENT)
Dept: OCCUPATIONAL THERAPY | Facility: CLINIC | Age: 71
DRG: 207 | End: 2021-07-16
Attending: HOSPITALIST
Payer: COMMERCIAL

## 2021-07-16 ENCOUNTER — APPOINTMENT (OUTPATIENT)
Dept: PHYSICAL THERAPY | Facility: CLINIC | Age: 71
DRG: 207 | End: 2021-07-16
Attending: HOSPITALIST
Payer: COMMERCIAL

## 2021-07-16 PROBLEM — R41.89 COGNITIVE IMPAIRMENT: Status: ACTIVE | Noted: 2021-07-16

## 2021-07-16 PROBLEM — R41.4 LEFT-SIDED NEGLECT: Status: ACTIVE | Noted: 2021-07-16

## 2021-07-16 PROBLEM — J96.02 ACUTE RESPIRATORY FAILURE WITH HYPOXIA AND HYPERCAPNIA (H): Status: ACTIVE | Noted: 2021-07-16

## 2021-07-16 PROBLEM — R13.19 OTHER DYSPHAGIA: Status: ACTIVE | Noted: 2021-07-16

## 2021-07-16 PROBLEM — J96.01 ACUTE RESPIRATORY FAILURE WITH HYPOXIA AND HYPERCAPNIA (H): Status: ACTIVE | Noted: 2021-07-16

## 2021-07-16 LAB
ANION GAP SERPL CALCULATED.3IONS-SCNC: 10 MMOL/L (ref 5–18)
BASOPHILS # BLD AUTO: 0 10E3/UL (ref 0–0.2)
BASOPHILS NFR BLD AUTO: 0 %
BUN SERPL-MCNC: 24 MG/DL (ref 8–22)
CALCIUM SERPL-MCNC: 10.1 MG/DL (ref 8.5–10.5)
CHLORIDE BLD-SCNC: 100 MMOL/L (ref 98–107)
CO2 SERPL-SCNC: 30 MMOL/L (ref 22–31)
CREAT SERPL-MCNC: 0.58 MG/DL (ref 0.7–1.3)
EOSINOPHIL # BLD AUTO: 0.3 10E3/UL (ref 0–0.7)
EOSINOPHIL NFR BLD AUTO: 4 %
ERYTHROCYTE [DISTWIDTH] IN BLOOD BY AUTOMATED COUNT: 14 % (ref 10–15)
GFR SERPL CREATININE-BSD FRML MDRD: >90 ML/MIN/1.73M2
GLUCOSE BLD-MCNC: 133 MG/DL (ref 70–125)
HCT VFR BLD AUTO: 31.3 % (ref 40–53)
HGB BLD-MCNC: 10.3 G/DL (ref 13.3–17.7)
IMM GRANULOCYTES # BLD: 0.2 10E3/UL
IMM GRANULOCYTES NFR BLD: 2 %
LYMPHOCYTES # BLD AUTO: 1.3 10E3/UL (ref 0.8–5.3)
LYMPHOCYTES NFR BLD AUTO: 14 %
MCH RBC QN AUTO: 31.3 PG (ref 26.5–33)
MCHC RBC AUTO-ENTMCNC: 32.9 G/DL (ref 31.5–36.5)
MCV RBC AUTO: 95 FL (ref 78–100)
MONOCYTES # BLD AUTO: 0.7 10E3/UL (ref 0–1.3)
MONOCYTES NFR BLD AUTO: 8 %
NEUTROPHILS # BLD AUTO: 6.5 10E3/UL (ref 1.6–8.3)
NEUTROPHILS NFR BLD AUTO: 72 %
NRBC # BLD AUTO: 0 10E3/UL
NRBC BLD AUTO-RTO: 0 /100
PLATELET # BLD AUTO: 346 10E3/UL (ref 150–450)
POTASSIUM BLD-SCNC: 4 MMOL/L (ref 3.5–5)
RBC # BLD AUTO: 3.29 10E6/UL (ref 4.4–5.9)
SODIUM SERPL-SCNC: 140 MMOL/L (ref 136–145)
WBC # BLD AUTO: 9.1 10E3/UL (ref 4–11)

## 2021-07-16 PROCEDURE — 250N000011 HC RX IP 250 OP 636

## 2021-07-16 PROCEDURE — 250N000013 HC RX MED GY IP 250 OP 250 PS 637: Performed by: INTERNAL MEDICINE

## 2021-07-16 PROCEDURE — 120N000018 HC R&B RESPIRATORY CARE WITH ATTENDANT

## 2021-07-16 PROCEDURE — 94640 AIRWAY INHALATION TREATMENT: CPT

## 2021-07-16 PROCEDURE — 94003 VENT MGMT INPAT SUBQ DAY: CPT | Performed by: INTERNAL MEDICINE

## 2021-07-16 PROCEDURE — 999N000009 HC STATISTIC AIRWAY CARE

## 2021-07-16 PROCEDURE — 97112 NEUROMUSCULAR REEDUCATION: CPT | Mod: GP | Performed by: PHYSICAL THERAPIST

## 2021-07-16 PROCEDURE — 94003 VENT MGMT INPAT SUBQ DAY: CPT

## 2021-07-16 PROCEDURE — 999N000157 HC STATISTIC RCP TIME EA 10 MIN

## 2021-07-16 PROCEDURE — 94640 AIRWAY INHALATION TREATMENT: CPT | Mod: 76

## 2021-07-16 PROCEDURE — 85025 COMPLETE CBC W/AUTO DIFF WBC: CPT | Performed by: INTERNAL MEDICINE

## 2021-07-16 PROCEDURE — 36415 COLL VENOUS BLD VENIPUNCTURE: CPT | Performed by: INTERNAL MEDICINE

## 2021-07-16 PROCEDURE — 250N000013 HC RX MED GY IP 250 OP 250 PS 637: Performed by: HOSPITALIST

## 2021-07-16 PROCEDURE — 250N000009 HC RX 250: Performed by: INTERNAL MEDICINE

## 2021-07-16 PROCEDURE — 250N000011 HC RX IP 250 OP 636: Performed by: INTERNAL MEDICINE

## 2021-07-16 PROCEDURE — 80048 BASIC METABOLIC PNL TOTAL CA: CPT | Performed by: HOSPITALIST

## 2021-07-16 PROCEDURE — 97530 THERAPEUTIC ACTIVITIES: CPT | Mod: GP | Performed by: PHYSICAL THERAPIST

## 2021-07-16 PROCEDURE — 92507 TX SP LANG VOICE COMM INDIV: CPT | Mod: GN

## 2021-07-16 PROCEDURE — 97110 THERAPEUTIC EXERCISES: CPT | Mod: GO | Performed by: OCCUPATIONAL THERAPIST

## 2021-07-16 PROCEDURE — 97535 SELF CARE MNGMENT TRAINING: CPT | Mod: GO | Performed by: OCCUPATIONAL THERAPIST

## 2021-07-16 PROCEDURE — 250N000013 HC RX MED GY IP 250 OP 250 PS 637

## 2021-07-16 RX ADMIN — ACETYLCYSTEINE 2 ML: 200 SOLUTION ORAL; RESPIRATORY (INHALATION) at 19:13

## 2021-07-16 RX ADMIN — SODIUM CHLORIDE 30 MG/ML INHALATION SOLUTION 3 ML: 30 SOLUTION INHALANT at 19:13

## 2021-07-16 RX ADMIN — Medication 1 PACKET: at 21:35

## 2021-07-16 RX ADMIN — ACETYLCYSTEINE 2 ML: 200 SOLUTION ORAL; RESPIRATORY (INHALATION) at 08:24

## 2021-07-16 RX ADMIN — ANORECTAL OINTMENT: 15.7; .44; 24; 20.6 OINTMENT TOPICAL at 21:31

## 2021-07-16 RX ADMIN — IPRATROPIUM BROMIDE AND ALBUTEROL SULFATE 3 ML: .5; 3 SOLUTION RESPIRATORY (INHALATION) at 08:23

## 2021-07-16 RX ADMIN — Medication 1 PACKET: at 11:31

## 2021-07-16 RX ADMIN — METRONIDAZOLE 500 MG: 500 INJECTION, SOLUTION INTRAVENOUS at 07:50

## 2021-07-16 RX ADMIN — IPRATROPIUM BROMIDE AND ALBUTEROL SULFATE 3 ML: .5; 3 SOLUTION RESPIRATORY (INHALATION) at 14:29

## 2021-07-16 RX ADMIN — IPRATROPIUM BROMIDE AND ALBUTEROL SULFATE 3 ML: .5; 3 SOLUTION RESPIRATORY (INHALATION) at 19:13

## 2021-07-16 RX ADMIN — GLYCOPYRROLATE 1 MG: 1 TABLET ORAL at 08:05

## 2021-07-16 RX ADMIN — LISINOPRIL 40 MG: 20 TABLET ORAL at 18:07

## 2021-07-16 RX ADMIN — FUROSEMIDE 40 MG: 10 INJECTION, SOLUTION INTRAMUSCULAR; INTRAVENOUS at 00:58

## 2021-07-16 RX ADMIN — SODIUM CHLORIDE 30 MG/ML INHALATION SOLUTION 3 ML: 30 SOLUTION INHALANT at 08:24

## 2021-07-16 RX ADMIN — NYSTATIN 500000 UNITS: 100000 SUSPENSION ORAL at 17:53

## 2021-07-16 RX ADMIN — IPRATROPIUM BROMIDE AND ALBUTEROL SULFATE 3 ML: .5; 3 SOLUTION RESPIRATORY (INHALATION) at 02:01

## 2021-07-16 RX ADMIN — MIRTAZAPINE 7.5 MG: 7.5 TABLET ORAL at 22:47

## 2021-07-16 RX ADMIN — CEFTAZIDIME, AVIBACTAM 2.5 G: 2; .5 POWDER, FOR SOLUTION INTRAVENOUS at 02:26

## 2021-07-16 RX ADMIN — NYSTATIN 500000 UNITS: 100000 SUSPENSION ORAL at 21:26

## 2021-07-16 RX ADMIN — VENLAFAXINE 25 MG: 25 TABLET ORAL at 11:36

## 2021-07-16 RX ADMIN — Medication 1 PACKET: at 13:20

## 2021-07-16 RX ADMIN — NYSTATIN 500000 UNITS: 100000 SUSPENSION ORAL at 08:04

## 2021-07-16 RX ADMIN — HEPARIN SODIUM 5000 UNITS: 5000 INJECTION, SOLUTION INTRAVENOUS; SUBCUTANEOUS at 13:20

## 2021-07-16 RX ADMIN — METRONIDAZOLE 500 MG: 500 INJECTION, SOLUTION INTRAVENOUS at 21:25

## 2021-07-16 RX ADMIN — FAMOTIDINE 20 MG: 20 TABLET, FILM COATED ORAL at 21:26

## 2021-07-16 RX ADMIN — ANORECTAL OINTMENT: 15.7; .44; 24; 20.6 OINTMENT TOPICAL at 13:21

## 2021-07-16 RX ADMIN — HEPARIN SODIUM 5000 UNITS: 5000 INJECTION, SOLUTION INTRAVENOUS; SUBCUTANEOUS at 05:22

## 2021-07-16 RX ADMIN — CEFTAZIDIME, AVIBACTAM 2.5 G: 2; .5 POWDER, FOR SOLUTION INTRAVENOUS at 17:53

## 2021-07-16 RX ADMIN — FUROSEMIDE 40 MG: 10 INJECTION, SOLUTION INTRAMUSCULAR; INTRAVENOUS at 07:48

## 2021-07-16 RX ADMIN — ANORECTAL OINTMENT: 15.7; .44; 24; 20.6 OINTMENT TOPICAL at 08:06

## 2021-07-16 RX ADMIN — CEFTAZIDIME, AVIBACTAM 2.5 G: 2; .5 POWDER, FOR SOLUTION INTRAVENOUS at 10:13

## 2021-07-16 RX ADMIN — VENLAFAXINE 25 MG: 25 TABLET ORAL at 07:48

## 2021-07-16 RX ADMIN — GLYCOPYRROLATE 1 MG: 1 TABLET ORAL at 13:20

## 2021-07-16 RX ADMIN — METHYLPHENIDATE HYDROCHLORIDE 10 MG: 10 TABLET ORAL at 06:47

## 2021-07-16 RX ADMIN — FAMOTIDINE 20 MG: 20 TABLET, FILM COATED ORAL at 08:04

## 2021-07-16 RX ADMIN — HEPARIN SODIUM 5000 UNITS: 5000 INJECTION, SOLUTION INTRAVENOUS; SUBCUTANEOUS at 21:26

## 2021-07-16 RX ADMIN — GLYCOPYRROLATE 1 MG: 1 TABLET ORAL at 21:26

## 2021-07-16 RX ADMIN — VENLAFAXINE 25 MG: 25 TABLET ORAL at 17:52

## 2021-07-16 RX ADMIN — NYSTATIN 500000 UNITS: 100000 SUSPENSION ORAL at 13:20

## 2021-07-16 ASSESSMENT — MIFFLIN-ST. JEOR: SCORE: 1449.59

## 2021-07-16 NOTE — PROGRESS NOTES
[facility] Pulmonary Medicine - Progress Note  7/14/2021     Assessment:  Principal Problem:    SAH (subarachnoid hemorrhage) (H)  Active Problems:    Acute respiratory failure with hypoxia (H)    ESBL (extended spectrum beta-lactamase) producing bacteria infection    Attention to tracheostomy (H)    Acute and chronic respiratory failure with hypoxia (H)    On mechanically assisted ventilation (H)    Encounter for palliative care    Encephalopathy    70 y.o. 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 Course c/b bilateral BAYLEE infarct with vasospasm. Extubated on 6/3/2021 and re-intubated on 6/6/2021 due to hypoxemia. On 6/7/2021, patient underwent tracheostomy and PEG tube placement. 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    Recommendations/Plans:    Continue phase 2 weaning with TM/PMV during the day, continue extending time on TM by 2 hours into the night time each night. Plan to go until 4am today. Discussed with RT. May be able to get off vent over the weekend. Major barrier will be secretion burden.     Cont Ceftazidime/avibactam + metronidazole per ID    Cont Bronchial hygiene w/ DuoNeb q6h,  + HS nebs & Mucomyst bid, cont suctioning prn.     Trach: 7 bivona, placed 6/7/21, most recently changed 7/05    SLP following, appreciate recommendations    Will resume following on Monday 7/19.     Isael Jane MD (Avi)  [facility] Pulmonary & Critical Care  Pager [phone]  Clinic [phone]  Fax [phone]       -------------    Subjective  Tolerated TM until 2am yesterday.  Still having copious thick secretions.     Ventilation Mode: CMV/AC  (Continuous Mandatory Ventilation/ Assist Control)  FiO2 (%): 28 %  Rate Set (breaths/minute): 14 breaths/min  Tidal Volume Set (mL): 450 mL  PEEP (cm H2O): 5 cmH2O  Oxygen  "Concentration (%): 30 %  Resp: 22    Tracheal secretions: overnight: x4 mod to large white, pale yellow thick.     Current phase of ventilator weaning pathway:  Phase 2  Ventilator weaning results from yesterday: TM for 18:30, until 2am  Full vent the rest of the night  FiO2 0.37, 10Lpm currently.     Physical Exam:  /79 (BP Location: Left arm)   Pulse 82   Temp 97.9  F (36.6  C) (Oral)   Resp 22   Ht 1.803 m (5' 10.98\")   Wt 65.8 kg (145 lb)   SpO2 98%   BMI 20.23 kg/m      GEN: NAD, resting in bed  HEENT: trach site c/d/i trachea is midline.   CVS: regular rhythm, no murmurs  RESP:  Coarse sounding with audible upper airway rattling.   ABD: Soft, No abdominal pain with palpation   SKIN: Warm extremities, no visible rashes  EXT: warm, no edema  NEURO:  Turns to examiner, frequently fiddling w/ one of the foam blocks, moving it in the air, mouths words intermittently    Pertinent Labs: Lab Results: personally reviewed.     Pertinent Radiology: personally reviewed, including Radiology reviewed most recent CXR  "

## 2021-07-16 NOTE — PLAN OF CARE
Problem: Adult Inpatient Plan of Care  Goal: Plan of Care Review  Outcome: Improving   Suctioned 4x ; secretions moderate and thick/thin. Cough is strong and productive. Loose bowel movement x 2; urine output-approximately 1000 ml. Last dose of Lasix given.  Problem: Adult Inpatient Plan of Care  Goal: Optimal Comfort and Wellbeing  Outcome: Improving   Started on transparent mittens; patient was observed for a few hours and noted to have safer mobility of arms and hands even if he attempts to scratch neck and face. 1:1 sitter discontinued.

## 2021-07-16 NOTE — PROGRESS NOTES
[facility]     Medicine Progress Note - Hospitalist Service       Date of Admission:  6/11/2021    Identifier:  70-year-old retired man who lived with his wife at home prehospitalization, who originally presented to [facility] on May 15, 2021 for altered mental state he had sustained a fall and was found to be unresponsive by his wife.  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  He was treated with milrinone and verapamil.  He was successfully extubated on Pamela 3 and on June 6 he was reintubated due to drop in his saturations and underwent tracheostomy placement as well as feeding tube placement on June 7.  June 9: EVD was removed.  He was transferred to LTAC on June 11  In the last 1 month he has not been able to be liberated from the ventilator.  He has had repeated bouts of Klebsiella infection in sputum and ventilator associated pneumonia.  Additionally he has had a large amount of tracheal secretions, significant cognitive impairment, left-sided neglect and ongoing episodes of agitation.  Patient's family has been monitoring the patient continuously during the daytime hours via video monitor and visit with him in person over the weekends.  Multiple specialties have been involved in the care of this patient including palliative care, infectious disease pulmonary medicine and neurology amongst others.    Previously patient was very restless at night and yesterday we adjusted medications as well as diuresed patient in an attempt to decrease the secretions and it seems to help.  He weaned for > 18 hours yesterday.        Assessment & Plan           1/.   Acute hypoxemic respiratory failure due to prolonged hospitalization, also because of significant intracranial hemorrhage.  One of the biggest barriers to decannulation is related to the issue of excessive secretions and these need to be addressed appropriately.  Added glycopyrrolate 1 mg 3 times a day and uptitrate as appropriate.  Definitely seems to have helped we will not increase the dosage at this time  Also diuresed him with Lasix for 3 doses with significant improvement  Because of these adjustments patient was able to tolerate a speaking valve for about 20 minutes for the first time today  2/.  Insomnia: Definitely making matters worse for the patient as I review his medications he is on Ritalin twice a day, getting Ritalin at bedtime is probably not the best idea for treatment of insomnia.  We will discontinue Ritalin at that time we will keep him at only once a day in the morning.  Started with nighttime mirtazapine 7.5 mg scheduled.  Which did seem to help  Can be uptitrated as appropriate.  3/.  Traumatic brain injury/intracranial hemorrhage: Will need to be on Effexor 25 mg 3 times daily which should definitely address matters in a meaningful way.  We will need to continue on this for a little while  4/.  Dysphagia: Secondary to respiratory failure continue with tube feeds at this time.  5/.  Ventilator associated pneumonia appreciate intervention and recommendations by infectious disease antibiotics to continue till July 18.  6/.  Prognosis and logistics: This is challenge because patient's wife is definitely having a difficult time with the situation and recognizing that the patient is unlikely going to be his prehospitalization self.  She probably does know it but is unwilling to accept the reality which is unfortunate.  Care progression conference would be mandatory and very beneficial I would recommend having it in person versus via video.  I would encourage patient's daughter who is also involved  in the care to be present for the same.  7/.  Restraints: We will try and use mittens and not a one-to-one sitter.       Diet: Adult Formula Drip Feeding: Continuous Osmolite 1.5; Gastrostomy; Goal Rate: 90; mL/hr; Medication - Feeding Tube Flush Frequency: 240 mL q 4 hours water flushes; Amount to Send (Nutrition use): send 3 packets Nutrisource Fiber daily; Tube Feeding...    DVT Prophylaxis: Heparin SQ  Gutierrez Catheter: Not present  Central Lines: None  Code Status: Full Code      Disposition Plan   Expected discharge: Unclear at this time recommended to transitional care unit once Liberation from the ventilator.     The patient's care was discussed with the Bedside Nurse, Care Coordinator/ and Patient's Family.    Latonya Lemus MD, [facility]  Hospitalist Service  [facility]  Securely message with the Vocera Web Console (learn more here)  Text page via [employer] Paging/Directory      ______________________________________________________________________    Interval History   Overnight as mentioned patient continues to be restless tugging and pulling at tubes.  He continues to demonstrate left-sided neglect is very fidgety with his hands.  His wife is quite involved in his care and constantly monitoring the patient and the proceedings in the room via iPad    Data reviewed today: I reviewed all medications, new labs and imaging results over the last 24 hours. I personally reviewed no images or EKG's today.    Physical Exam   Vital Signs: Temp: 98.7  F (37.1  C) Temp src: Oral BP: 105/70 Pulse: 92   Resp: 24 SpO2: 99 % O2 Device: Mechanical Ventilator Oxygen Delivery: 10 LPM  Weight: 145 lbs 0 oz  General Appearance: Awake sitting in chair, left-sided neglect  Respiratory: Coarse breath sounds tracheostomy in place  Cardiovascular: S1-S2 regular rate and rhythm  GI: Soft nondistended bowel sounds are present feeding tube noted  Skin: No rashes or lesions.  There is a small bump on the  dorsum of the left hand which is not red, painful does not involve the wrist joint and is not overlying any IV access  Other: No lower extremity swelling  Patient is moving both upper extremities without any difficulty and constantly fidgeting  Neurological exam cannot be undertaken because of patient being restless    Data   Recent Labs   Lab 07/16/21  0543 07/14/21  0626 07/13/21  1813 07/13/21  1012 07/12/21  1748   WBC 9.1  --   --  8.4 10.3   HGB 10.3*  --   --  9.7* 9.6*   MCV 95  --   --  99 99     --   --  285 284    144 146* 145  --    POTASSIUM 4.0 4.1  --  4.4  --    CHLORIDE 100 105  --  105  --    CO2 30 29  --  27  --    BUN 24* 24*  --  23*  --    CR 0.58* 0.57*  --  0.54*  --    ANIONGAP 10 10  --  13  --    RAGINI 10.1 10.5  --  10.7*  --    * 124  --  133*  --    7/.

## 2021-07-16 NOTE — PLAN OF CARE
Problem: Adult Inpatient Plan of Care  Goal: Optimal Comfort and Wellbeing  Outcome: Improving       Pt remains on a 1:1 to ensure he doesn't pull/tug @ lines/tubes. He once disconnected ventilator from trach with movement of hands while sleeping. Vent alarm sounded & immediately reconnected. LS coarse sounding on eves, clear on nights. O2 sats have been % on trach dome & vent. Placed on vent by RT @ 0200. Suctioned X5 over past 12 hrs for small-mod returns. Pt received 2nd dose of IV Lasix 40 mg on nights. Due to receive last dose this am. UO over past 12 hrs= 1700 ml in addition pt heavily incontinent of urine X1 on eves d/t condom catheter coming loose. Required a total bed change. Receives 240 ml water flushes via G-tube q 4 hrs. Residuals 20 ml or less. However, pt routinely had a lot of free air in stomach that was removed. Pt alert on eves attempting to mouth some words, yet difficult to always make out what pt is saying. Given oral cares; brushed tongue well, coating improving nicely with use of Nystatin solution. Scrotum remains swollen, appears to have (L) inguinal hernia. Pt moving both upper extremities; (R) arm more so than (L). On eves would occasionally bring hands up towards face or reach out in air with (R) hand. Foam blocks provided for pt to hold somewhat helpful with distracting him. Pt slept 80% of the night. Would awaken for short periods & stare @ the ceiling, then go back to bed on his own. Denies pain when awake.

## 2021-07-16 NOTE — PROGRESS NOTES
"Palliative Care Social Work Note    Brief follow up visit with Matt. His wife Susy and dtr Yolanda were on IPAD. Discussed possibility of a family care conference next week Tues at 10am in person with the team. They are only available via IPAD that day due to Yolanda's work schedule. Yolanda is willing to do an in person visit a different day. Susy was concerned about the \"agenda\" of the meeting and didn't feel that in person would be \"any different than IPAD.. Reassured her it is an opportunity to talk with everyone involved in Matt's care.Agreed to discuss with KIRAN Tran to help with coordination.     PC SW will continue to be available to provide psychosocial/emotional support.     Josephine Dailey, [facility]  Palliative Care   Office # [phone]  "

## 2021-07-16 NOTE — PLAN OF CARE
Problem: Mechanical Ventilation  Goal: Patient will maintain patent airway  Outcome: Progressing  Goal: Tracheostomy will be managed safely  Outcome: Progressing  Goal: Respiratory status - ventilation  Description: Movement of air in and out of the lungs.    Liberate from ventilator  Outcome: Progressing   RT PROGRESS NOTE   [id number]  DATA:     CURRENT SETTINGS:             TRACH TYPE / SIZE: #7 Bivona, downsized from #6 Shiley DCT on 7/5             MODE:   PRVC 14 450 +5 30%             FIO2:        ACTION:             THERAPIES:   Duoneb q6, MucomystBID, Hypertonic SolBID             SUCTION:                           FREQUENCY:   X4 for mod to lg white/p-yellow thick                        AMOUNT:                           CONSISTENCY:                           COLOR:                SPONTANEOUS COUGH EFFORT/STRENGTH OF EFFORT (not elicited by suctioning): Yes                              WEANING PHASE: 2                        WEAN MODE:  HFTM  Cuff up 27% 10L for 18.5hr  reji well.   TM trials since 6/20                   WEAN TIME:                           END WEAN REASON:   placed on a vent at 2am     RESPONSE:             BS: coarse to clr               VITAL SIGNS:                EMOTIONAL NEEDS / CONCERNS:                  RISK FOR SELF DECANNULATION:                          RISK DUE TO:                          INTERVENTION/S IN PLACE IS/ARE:         NOTE / PLAN:     Pt was on TM almost till midnight yesterday. Today till   2 a.m,  reji well  Cont to monitor closely.

## 2021-07-16 NOTE — PLAN OF CARE
Problem: OT General Care Plan  Goal: Hygiene/Grooming (OT)  Description: Hygiene/Grooming (OT)  7/15/2021 2114 by Dahiana Connelly, OTR  Outcome: Improving  Flowsheets (Taken 7/11/2021 1254)  OT goal: hygiene/grooming:   minimal assist   from wheelchair  7/15/2021 2111 by Dahiana Connelly, OTR  Outcome: Improving  Goal: Upper Body Dressing (OT)  Description: Upper Body Dressing (OT)  7/15/2021 2114 by Dahiana Connelly, OTR  Outcome: Improving  Flowsheets (Taken 7/11/2021 1254)  OT goal: upper body dressing: (caryn technique as appropriate) Minimal assist  7/15/2021 2111 by Dahiana Connelly, OTR  Outcome: Improving  Goal: Bed Mobility (OT)  Description: Bed Mobility (OT)  7/15/2021 2114 by Dahiana Connelly, JOSE ALBERTOR  Outcome: Improving  Flowsheets (Taken 7/11/2021 1254)  OT goal: bed mobility: Maximum assist  7/15/2021 2111 by Dahiana Connelly, JOSE ALBERTOR  Outcome: Improving  Goal: Transfer (OT)  Description: Transfer (OT)  7/15/2021 2114 by Dahiana Connelly, OTR  Outcome: Improving  Flowsheets (Taken 7/11/2021 1254)  OT Goal: transfer: Moderate assist  7/15/2021 2111 by Dahiana Connelly OTR  Outcome: Improving  Goal: OT Goal 1  Description: OT Goal 1  7/15/2021 2114 by Dahiana Connelly, OTR  Outcome: Improving  Flowsheets (Taken 7/15/2021 2111)  OT goal 1: Tolerate sitting EOB w/ mod A for 10 minutes.  7/15/2021 2111 by Dahiana Connelly, OTR  Outcome: Improving  Flowsheets (Taken 7/15/2021 2111)  OT goal 1: Tolerate sitting EOB w/ mod A for 10 minutes.  Goal: OT Goal 2  Description: OT Goal 2  7/15/2021 2114 by Dahiana Connelly OTR  Outcome: Improving  Flowsheets (Taken 7/11/2021 1254)  OT goal 2: Participate in further visual assmt in order to assist with safe d/c planning.  7/15/2021 2111 by Dahiana Connelly, OTR  Outcome: Improving  Goal: OT Goal 3  Description: OT Goal 3  7/15/2021 2114 by Dahiana Connelly OTR  Outcome: Improving  Flowsheets (Taken 7/11/2021 1254)  OT goal 3: Participate in 15 minues of UE ex/activity to increase activity tolerance for daily  tasks/transfers.  7/15/2021 2111 by Dahiana Connelly OTR  Outcome: Improving  Goal: OT Goal 4  Description: OT Goal 4  7/15/2021 2114 by Dahiana Connelly OTR  Outcome: Improving  Flowsheets (Taken 7/11/2021 1254)  OT goal 4: Attend to therapy tasks for 15 minutes with min A/verbal cues for ease of ADL completion.  7/15/2021 2111 by Dahiana Connelly OTR  Outcome: Improving     Goals reviewed and updated on 7/15/2021  By CRISTOPHER Orozco

## 2021-07-17 ENCOUNTER — APPOINTMENT (OUTPATIENT)
Dept: PHYSICAL THERAPY | Facility: CLINIC | Age: 71
DRG: 207 | End: 2021-07-17
Attending: HOSPITALIST
Payer: COMMERCIAL

## 2021-07-17 PROCEDURE — 97530 THERAPEUTIC ACTIVITIES: CPT | Mod: GP | Performed by: PHYSICAL THERAPIST

## 2021-07-17 PROCEDURE — 250N000013 HC RX MED GY IP 250 OP 250 PS 637: Performed by: HOSPITALIST

## 2021-07-17 PROCEDURE — 250N000013 HC RX MED GY IP 250 OP 250 PS 637

## 2021-07-17 PROCEDURE — 94640 AIRWAY INHALATION TREATMENT: CPT | Mod: 76

## 2021-07-17 PROCEDURE — 250N000011 HC RX IP 250 OP 636: Performed by: INTERNAL MEDICINE

## 2021-07-17 PROCEDURE — 250N000013 HC RX MED GY IP 250 OP 250 PS 637: Performed by: INTERNAL MEDICINE

## 2021-07-17 PROCEDURE — 94640 AIRWAY INHALATION TREATMENT: CPT

## 2021-07-17 PROCEDURE — 250N000009 HC RX 250: Performed by: INTERNAL MEDICINE

## 2021-07-17 PROCEDURE — 999N000157 HC STATISTIC RCP TIME EA 10 MIN

## 2021-07-17 PROCEDURE — 94664 DEMO&/EVAL PT USE INHALER: CPT

## 2021-07-17 PROCEDURE — 97110 THERAPEUTIC EXERCISES: CPT | Mod: GP | Performed by: PHYSICAL THERAPIST

## 2021-07-17 PROCEDURE — 94003 VENT MGMT INPAT SUBQ DAY: CPT

## 2021-07-17 PROCEDURE — 99233 SBSQ HOSP IP/OBS HIGH 50: CPT | Performed by: INTERNAL MEDICINE

## 2021-07-17 PROCEDURE — 250N000011 HC RX IP 250 OP 636

## 2021-07-17 PROCEDURE — 999N000009 HC STATISTIC AIRWAY CARE

## 2021-07-17 PROCEDURE — 120N000018 HC R&B RESPIRATORY CARE WITH ATTENDANT

## 2021-07-17 RX ADMIN — HEPARIN SODIUM 5000 UNITS: 5000 INJECTION, SOLUTION INTRAVENOUS; SUBCUTANEOUS at 21:11

## 2021-07-17 RX ADMIN — METRONIDAZOLE 500 MG: 500 INJECTION, SOLUTION INTRAVENOUS at 08:02

## 2021-07-17 RX ADMIN — HEPARIN SODIUM 5000 UNITS: 5000 INJECTION, SOLUTION INTRAVENOUS; SUBCUTANEOUS at 05:59

## 2021-07-17 RX ADMIN — IPRATROPIUM BROMIDE AND ALBUTEROL SULFATE 3 ML: .5; 3 SOLUTION RESPIRATORY (INHALATION) at 19:31

## 2021-07-17 RX ADMIN — CEFTAZIDIME, AVIBACTAM 2.5 G: 2; .5 POWDER, FOR SOLUTION INTRAVENOUS at 18:15

## 2021-07-17 RX ADMIN — SODIUM CHLORIDE 30 MG/ML INHALATION SOLUTION 3 ML: 30 SOLUTION INHALANT at 19:31

## 2021-07-17 RX ADMIN — FAMOTIDINE 20 MG: 20 TABLET, FILM COATED ORAL at 21:10

## 2021-07-17 RX ADMIN — Medication 1 PACKET: at 13:52

## 2021-07-17 RX ADMIN — CEFTAZIDIME, AVIBACTAM 2.5 G: 2; .5 POWDER, FOR SOLUTION INTRAVENOUS at 01:15

## 2021-07-17 RX ADMIN — ANORECTAL OINTMENT: 15.7; .44; 24; 20.6 OINTMENT TOPICAL at 13:54

## 2021-07-17 RX ADMIN — METHYLPHENIDATE HYDROCHLORIDE 10 MG: 10 TABLET ORAL at 06:32

## 2021-07-17 RX ADMIN — NYSTATIN 500000 UNITS: 100000 SUSPENSION ORAL at 21:10

## 2021-07-17 RX ADMIN — Medication 1 PACKET: at 08:01

## 2021-07-17 RX ADMIN — VENLAFAXINE 25 MG: 25 TABLET ORAL at 08:02

## 2021-07-17 RX ADMIN — IPRATROPIUM BROMIDE AND ALBUTEROL SULFATE 3 ML: .5; 3 SOLUTION RESPIRATORY (INHALATION) at 16:30

## 2021-07-17 RX ADMIN — METRONIDAZOLE 500 MG: 500 INJECTION, SOLUTION INTRAVENOUS at 20:55

## 2021-07-17 RX ADMIN — IPRATROPIUM BROMIDE AND ALBUTEROL SULFATE 3 ML: .5; 3 SOLUTION RESPIRATORY (INHALATION) at 01:39

## 2021-07-17 RX ADMIN — ACETYLCYSTEINE 2 ML: 200 SOLUTION ORAL; RESPIRATORY (INHALATION) at 19:31

## 2021-07-17 RX ADMIN — ANORECTAL OINTMENT: 15.7; .44; 24; 20.6 OINTMENT TOPICAL at 08:02

## 2021-07-17 RX ADMIN — CEFTAZIDIME, AVIBACTAM 2.5 G: 2; .5 POWDER, FOR SOLUTION INTRAVENOUS at 11:36

## 2021-07-17 RX ADMIN — ANORECTAL OINTMENT: 15.7; .44; 24; 20.6 OINTMENT TOPICAL at 21:12

## 2021-07-17 RX ADMIN — NYSTATIN 500000 UNITS: 100000 SUSPENSION ORAL at 13:52

## 2021-07-17 RX ADMIN — NYSTATIN 500000 UNITS: 100000 SUSPENSION ORAL at 08:02

## 2021-07-17 RX ADMIN — IPRATROPIUM BROMIDE AND ALBUTEROL SULFATE 3 ML: .5; 3 SOLUTION RESPIRATORY (INHALATION) at 08:35

## 2021-07-17 RX ADMIN — HEPARIN SODIUM 5000 UNITS: 5000 INJECTION, SOLUTION INTRAVENOUS; SUBCUTANEOUS at 13:53

## 2021-07-17 RX ADMIN — GLYCOPYRROLATE 1 MG: 1 TABLET ORAL at 21:10

## 2021-07-17 RX ADMIN — SODIUM CHLORIDE 30 MG/ML INHALATION SOLUTION 3 ML: 30 SOLUTION INHALANT at 08:36

## 2021-07-17 RX ADMIN — Medication 1 PACKET: at 21:10

## 2021-07-17 RX ADMIN — LISINOPRIL 40 MG: 20 TABLET ORAL at 18:15

## 2021-07-17 RX ADMIN — ACETYLCYSTEINE 2 ML: 200 SOLUTION ORAL; RESPIRATORY (INHALATION) at 08:36

## 2021-07-17 RX ADMIN — VENLAFAXINE 25 MG: 25 TABLET ORAL at 18:15

## 2021-07-17 RX ADMIN — FAMOTIDINE 20 MG: 20 TABLET, FILM COATED ORAL at 08:01

## 2021-07-17 RX ADMIN — GLYCOPYRROLATE 1 MG: 1 TABLET ORAL at 08:02

## 2021-07-17 RX ADMIN — NYSTATIN 500000 UNITS: 100000 SUSPENSION ORAL at 18:15

## 2021-07-17 RX ADMIN — VENLAFAXINE 25 MG: 25 TABLET ORAL at 11:36

## 2021-07-17 RX ADMIN — GLYCOPYRROLATE 1 MG: 1 TABLET ORAL at 13:53

## 2021-07-17 ASSESSMENT — MIFFLIN-ST. JEOR: SCORE: 1419.19

## 2021-07-17 NOTE — PLAN OF CARE
Problem: Adult Inpatient Plan of Care  Goal: Absence of Hospital-Acquired Illness or Injury  Intervention: Prevent Infection  Recent Flowsheet Documentation  Taken 7/16/2021 1800 by Amanda Jean RN  Infection Prevention: personal protective equipment utilized   Patient was calm and cooperative with cares. IV antibiotics continued.

## 2021-07-17 NOTE — ANESTHESIA POSTPROCEDURE EVALUATION
Patient: Dayron Neri    Procedure(s):  CRANIOTOMY, FOR ANEURYSM REPAIR  CRANIOTOMY, FOR SUBDURAL HEMATOMA EVACUATION    Diagnosis:Subarachnoid hemorrhage due to cerebral aneurysm (H) [I60.8]  Diagnosis Additional Information: No value filed.    Anesthesia Type:  General    Note:  Disposition: ICU            ICU Sign Out: Anesthesiologist/ICU physician sign out WAS performed   Postop Pain Control: Uneventful            Sign Out: Well controlled pain   PONV: No   Neuro/Psych:             Sign Out: PLANNED postop sedation   Airway/Respiratory:             Sign Out: AIRWAY IN SITU/Resp. Support               Airway in situ/Resp. Support: ETT                 Reason: Planned Pre-op   CV/Hemodynamics:             Sign Out: Acceptable CV status   Other NRE: NONE   DID A NON-ROUTINE EVENT OCCUR? No           Last vitals:  Vitals:    06/11/21 1200 06/11/21 1300 06/11/21 1400   BP: (!) 149/107 (!) 130/90 (!) 128/91   Pulse: 75 76 77   Resp: 10 19 15   Temp: 36.9  C (98.5  F)     SpO2:          Last vitals prior to Anesthesia Care Transfer:  CRNA VITALS  5/15/2021 1940 - 5/15/2021 2040      5/15/2021             EKG:  Sinus rhythm          Electronically Signed By: Ashish Espinoza MD  July 17, 2021  7:51 AM

## 2021-07-17 NOTE — PLAN OF CARE
Problem: Adult Inpatient Plan of Care  Goal: Plan of Care Review  Outcome: No Change     Pt alert. Vital signs stable. Pt denied pain. No PRNs given. Pt's wife and daughter visiting. All care needs met.

## 2021-07-17 NOTE — PLAN OF CARE
Problem: Adult Inpatient Plan of Care  Goal: Optimal Comfort and Wellbeing  Outcome: No Change   Patient slept well last night 7-8 hrs, suction thick green tracheal secretions. Vitals stable. He doesn't seem to be in any discomfort. Will continue to monitor.

## 2021-07-17 NOTE — PROGRESS NOTES
RT PROGRESS NOTE    DATA  CURRENT SETTINGS --  Ventilation Mode: CMV/AC  (Continuous Mandatory Ventilation/ Assist Control) (PRVC A/C)  FiO2 (%): 30 %  Rate Set (breaths/minute): 14 breaths/min  Tidal Volume Set (mL): 450 mL  PEEP (cm H2O): 5 cmH2O  Oxygen Concentration (%): 30 %  Resp: 20  TRACH TYPE -- Bivona #7 Cuffed    ACTION  THERAPIES -- BID Mucomyst, Q6 Duoneb, BID NaCl 3%  SUCTION   FREQUENCY -- five times   AMOUNT -- moderate to large   CONSISTENCY -- thick    COLOR -- white/yellow    SPONTANEOUS COUGH  EFFORT/STRENGTH -- productive   WEAN PHASE #2    MODE -- Heated Trach Dome     DURATION -- over 16 hours    END REASON -- Desaturation    RESPONSE  BREATH SOUNDS -- Clear  VITAL SIGNS --   Temp:  [97.9  F (36.6  C)-98.4  F (36.9  C)] 98.4  F (36.9  C)  Pulse:  [82-96] 95  Resp:  [16-22] 20  BP: (100-119)/(65-83) 115/73  FiO2 (%):  [28 %-30 %] 30 %  SpO2:  [93 %-100 %] 93 %   EMOTIONAL CONCERNS -- No   RISK FOR SELF-DECANNULATION and/or INTERVENTION -- Yes/Mitts in place     NOTE -- Patient placed on ventilator overnight        Kayla Lux, RT

## 2021-07-17 NOTE — PROGRESS NOTES
[facility]    Medicine Progress Note - Hospitalist Service       Date of Admission:  6/11/2021    Identifier:  70-year-old retired man who lived with his wife at home prehospitalization, who originally presented to [facility] on May 15, 2021 for altered mental state he had sustained a fall and was found to be unresponsive by his wife.  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  He was treated with milrinone and verapamil.  He was successfully extubated on Pamela 3 and on June 6 he was reintubated due to drop in his saturations and underwent tracheostomy placement as well as feeding tube placement on June 7.  June 9: EVD was removed.  He was transferred to LTAC on June 11  In the last 1 month he has not been able to be liberated from the ventilator.  He has had repeated bouts of Klebsiella infection in sputum and ventilator associated pneumonia.  Additionally he has had a large amount of tracheal secretions, significant cognitive impairment, left-sided neglect and ongoing episodes of agitation.  Patient's family has been monitoring the patient continuously during the daytime hours via video monitor and visit with him in person over the weekends.  Multiple specialties have been involved in the care of this patient including palliative care, infectious disease pulmonary medicine and neurology amongst others.    Previously patient was very restless at night and yesterday we adjusted medications as well as diuresed patient in an attempt to decrease the secretions and it seems to help.  He weaned for > 18 hours 7/15        7/17 :       No  significant agitation noted  Restraints renewed  Started on effexor yesterday  Respiratory status stable on 30 liters  Discussed about patient's condition with wife          Assessment & Plan           1/.  Acute hypoxemic respiratory failure due to prolonged hospitalization, also because of significant intracranial hemorrhage.  One of the biggest barriers to decannulation is related to the issue of excessive secretions and these need to be addressed appropriately.  Added glycopyrrolate 1 mg 3 times a day and uptitrate as appropriate.  Definitely seems to have helped we will not increase the dosage at this time  Also diuresed him with Lasix for 3 doses with significant improvement  Because of these adjustments patient was able to tolerate a speaking valve for about 20 minutes for the first time today  2/.  Insomnia: Definitely making matters worse for the patient as I review his medications he is on Ritalin twice a day, getting Ritalin at bedtime is probably not the best idea for treatment of insomnia.  We will discontinue Ritalin at that time we will keep him at only once a day in the morning.  Started with nighttime mirtazapine 7.5 mg scheduled.  Which did seem to help  Can be uptitrated as appropriate.  3/.  Traumatic brain injury/intracranial hemorrhage: Will need to be on Effexor 25 mg 3 times daily which should definitely address matters in a meaningful way.  We will need to continue on this for a little while  4/.  Dysphagia: Secondary to respiratory failure continue with tube feeds at this time.  5/.  Ventilator associated pneumonia appreciate intervention and recommendations by infectious disease antibiotics to continue till July 18.  6/.  Prognosis and logistics: This is challenge because patient's wife is definitely having a difficult time with the situation and recognizing that the patient is unlikely going to be his prehospitalization self.  She probably does know it but is unwilling to accept the  reality which is unfortunate.  Care progression conference would be mandatory and very beneficial I would recommend having it in person versus via video.  I would encourage patient's daughter who is also involved in the care to be present for the same.  7/.  Restraints: We will try and use mittens and not a one-to-one sitter.       Diet: Adult Formula Drip Feeding: Continuous Osmolite 1.5; Gastrostomy; Goal Rate: 90; mL/hr; Medication - Feeding Tube Flush Frequency: 240 mL q 4 hours water flushes; Amount to Send (Nutrition use): send 3 packets Nutrisource Fiber daily; Tube Feeding...    DVT Prophylaxis: Heparin SQ  Gutierrez Catheter: Not present  Central Lines: None  Code Status: Full Code      Disposition Plan   Expected discharge: Unclear at this time recommended to transitional care unit once Liberation from the ventilator.     The patient's care was discussed with the Bedside Nurse, Care Coordinator/ and Patient's Family.    DR. XI RODRIGUEZ  Hospitalist Service  [facility]  Securely message with the Vocera Web Console (learn more here)  Text page via Smart Device Media Paging/Directory      ______________________________________________________________________      Data reviewed today: I reviewed all medications, new labs and imaging results over the last 24 hours. I personally reviewed no images or EKG's today.    Physical Exam   Vital Signs: Temp: 98.1  F (36.7  C) Temp src: Oral BP: 117/74 Pulse: 81   Resp: 24 SpO2: 92 % O2 Device: Trach dome Oxygen Delivery: 30 LPM  Weight: 138 lbs 4.8 oz  General Appearance: Awake sitting in chair, left-sided neglect  Respiratory: Coarse breath sounds tracheostomy in place  Cardiovascular: S1-S2 regular rate and rhythm  GI: Soft nondistended bowel sounds are present feeding tube noted  Skin: No rashes or lesions.  There is a small bump on the dorsum of the left hand which is not red, painful does not involve the wrist joint and is not overlying any  IV access  Other: No lower extremity swelling  Patient is moving both upper extremities without any difficulty and constantly fidgeting  Neurological exam cannot be undertaken because of patient being restless    Data   Recent Labs   Lab 07/16/21  0543 07/14/21  0626 07/13/21  1813 07/13/21  1012 07/12/21  1748   WBC 9.1  --   --  8.4 10.3   HGB 10.3*  --   --  9.7* 9.6*   MCV 95  --   --  99 99     --   --  285 284    144 146* 145  --    POTASSIUM 4.0 4.1  --  4.4  --    CHLORIDE 100 105  --  105  --    CO2 30 29  --  27  --    BUN 24* 24*  --  23*  --    CR 0.58* 0.57*  --  0.54*  --    ANIONGAP 10 10  --  13  --    RAGINI 10.1 10.5  --  10.7*  --    * 124  --  133*  --    7/.

## 2021-07-17 NOTE — PLAN OF CARE
Problem: Communication Impairment (Mechanical Ventilation, Invasive)  Goal: Effective Communication  Outcome: Improving     Problem: Device-Related Complication Risk (Mechanical Ventilation, Invasive)  Goal: Optimal Device Function  Outcome: Improving  Intervention: Optimize Device Care and Function  Recent Flowsheet Documentation  Taken 7/17/2021 0139 by Kayla Small, RT  Airway Safety Measures:   all equipment/monitors on and audible   mask at bedside   manual resuscitator at bedside   manual resuscitator/mask/valve in room   suction at bedside   suction regulator   suction equipment   oxygen flowmeter  Taken 7/16/2021 2356 by Kayla Small, RT  Airway Safety Measures:   all equipment/monitors on and audible   manual resuscitator/mask/valve in room   suction at bedside   suction regulator   suction equipment   oxygen flowmeter  Taken 7/16/2021 1913 by Kayla Small, RT  Airway Safety Measures:   all equipment/monitors on and audible   manual resuscitator/mask/valve in room   suction at bedside   suction regulator   suction equipment   oxygen flowmeter     Problem: Inability to Wean (Mechanical Ventilation, Invasive)  Goal: Mechanical Ventilation Liberation  Outcome: Improving     Problem: Skin and Tissue Injury (Mechanical Ventilation, Invasive)  Goal: Absence of Device-Related Skin and Tissue Injury  Outcome: Improving     Problem: Ventilator-Induced Lung Injury (Mechanical Ventilation, Invasive)  Goal: Absence of Ventilator-Induced Lung Injury  Outcome: Improving

## 2021-07-17 NOTE — PLAN OF CARE
Patient primary vent settings are: ac/14/450/5/30% . Patient weans on Trach Mask during day. Wean started at 0840 am and still ongoing. Patient has a lot of secretions, large to copious thick pale yellow secretions with suction. On tm, patient settings were 10-L / 30%. But [facility] through my shift needed that increased. Currently now on 30 liters, and 32%. Oxygen saturations vary, low 90's to mid 90's. Breathing is shallow, and cough is weak but productive.  Patient while weaning remains with cuff inflated and occasional cuff down for suction was beneficial , yielding copious thick yellow secretions oral / tracheal. Other v/s remain ok, and acceptable. Patient currently is on Mucomyst bid, Saline bid, and uses Ipratropium/Atrovent Q 6 hours.Patient noted to do well weaning.

## 2021-07-18 ENCOUNTER — APPOINTMENT (OUTPATIENT)
Dept: SPEECH THERAPY | Facility: CLINIC | Age: 71
DRG: 207 | End: 2021-07-18
Attending: HOSPITALIST
Payer: COMMERCIAL

## 2021-07-18 PROCEDURE — 250N000011 HC RX IP 250 OP 636

## 2021-07-18 PROCEDURE — 999N000009 HC STATISTIC AIRWAY CARE

## 2021-07-18 PROCEDURE — 94640 AIRWAY INHALATION TREATMENT: CPT | Mod: 76

## 2021-07-18 PROCEDURE — 120N000018 HC R&B RESPIRATORY CARE WITH ATTENDANT

## 2021-07-18 PROCEDURE — 250N000011 HC RX IP 250 OP 636: Performed by: INTERNAL MEDICINE

## 2021-07-18 PROCEDURE — 92507 TX SP LANG VOICE COMM INDIV: CPT | Mod: GN | Performed by: SPEECH-LANGUAGE PATHOLOGIST

## 2021-07-18 PROCEDURE — 250N000013 HC RX MED GY IP 250 OP 250 PS 637

## 2021-07-18 PROCEDURE — 250N000009 HC RX 250: Performed by: INTERNAL MEDICINE

## 2021-07-18 PROCEDURE — 94664 DEMO&/EVAL PT USE INHALER: CPT

## 2021-07-18 PROCEDURE — 94640 AIRWAY INHALATION TREATMENT: CPT

## 2021-07-18 PROCEDURE — 272N000063 HC CIRCUIT HUMID FACE/TRACH MSK

## 2021-07-18 PROCEDURE — 999N000157 HC STATISTIC RCP TIME EA 10 MIN

## 2021-07-18 PROCEDURE — 250N000013 HC RX MED GY IP 250 OP 250 PS 637: Performed by: HOSPITALIST

## 2021-07-18 PROCEDURE — 99232 SBSQ HOSP IP/OBS MODERATE 35: CPT | Performed by: INTERNAL MEDICINE

## 2021-07-18 PROCEDURE — 250N000013 HC RX MED GY IP 250 OP 250 PS 637: Performed by: INTERNAL MEDICINE

## 2021-07-18 RX ADMIN — FAMOTIDINE 20 MG: 20 TABLET, FILM COATED ORAL at 21:50

## 2021-07-18 RX ADMIN — IPRATROPIUM BROMIDE AND ALBUTEROL SULFATE 3 ML: .5; 3 SOLUTION RESPIRATORY (INHALATION) at 19:12

## 2021-07-18 RX ADMIN — VENLAFAXINE 25 MG: 25 TABLET ORAL at 10:03

## 2021-07-18 RX ADMIN — HEPARIN SODIUM 5000 UNITS: 5000 INJECTION, SOLUTION INTRAVENOUS; SUBCUTANEOUS at 21:50

## 2021-07-18 RX ADMIN — ACETYLCYSTEINE 2 ML: 200 SOLUTION ORAL; RESPIRATORY (INHALATION) at 19:12

## 2021-07-18 RX ADMIN — MIRTAZAPINE 7.5 MG: 7.5 TABLET ORAL at 21:50

## 2021-07-18 RX ADMIN — HEPARIN SODIUM 5000 UNITS: 5000 INJECTION, SOLUTION INTRAVENOUS; SUBCUTANEOUS at 13:29

## 2021-07-18 RX ADMIN — CEFTAZIDIME, AVIBACTAM 2.5 G: 2; .5 POWDER, FOR SOLUTION INTRAVENOUS at 02:07

## 2021-07-18 RX ADMIN — METHYLPHENIDATE HYDROCHLORIDE 10 MG: 10 TABLET ORAL at 06:49

## 2021-07-18 RX ADMIN — GLYCOPYRROLATE 1 MG: 1 TABLET ORAL at 13:29

## 2021-07-18 RX ADMIN — ANORECTAL OINTMENT: 15.7; .44; 24; 20.6 OINTMENT TOPICAL at 22:17

## 2021-07-18 RX ADMIN — NYSTATIN 500000 UNITS: 100000 SUSPENSION ORAL at 12:12

## 2021-07-18 RX ADMIN — FAMOTIDINE 20 MG: 20 TABLET, FILM COATED ORAL at 10:03

## 2021-07-18 RX ADMIN — NYSTATIN 500000 UNITS: 100000 SUSPENSION ORAL at 21:50

## 2021-07-18 RX ADMIN — LISINOPRIL 40 MG: 20 TABLET ORAL at 19:34

## 2021-07-18 RX ADMIN — ANORECTAL OINTMENT: 15.7; .44; 24; 20.6 OINTMENT TOPICAL at 13:30

## 2021-07-18 RX ADMIN — HEPARIN SODIUM 5000 UNITS: 5000 INJECTION, SOLUTION INTRAVENOUS; SUBCUTANEOUS at 05:30

## 2021-07-18 RX ADMIN — ACETYLCYSTEINE 2 ML: 200 SOLUTION ORAL; RESPIRATORY (INHALATION) at 07:32

## 2021-07-18 RX ADMIN — ANORECTAL OINTMENT: 15.7; .44; 24; 20.6 OINTMENT TOPICAL at 10:03

## 2021-07-18 RX ADMIN — IPRATROPIUM BROMIDE AND ALBUTEROL SULFATE 3 ML: .5; 3 SOLUTION RESPIRATORY (INHALATION) at 01:15

## 2021-07-18 RX ADMIN — VENLAFAXINE 25 MG: 25 TABLET ORAL at 17:31

## 2021-07-18 RX ADMIN — VENLAFAXINE 25 MG: 25 TABLET ORAL at 11:56

## 2021-07-18 RX ADMIN — SODIUM CHLORIDE 30 MG/ML INHALATION SOLUTION 3 ML: 30 SOLUTION INHALANT at 19:12

## 2021-07-18 RX ADMIN — NYSTATIN 500000 UNITS: 100000 SUSPENSION ORAL at 17:31

## 2021-07-18 RX ADMIN — NYSTATIN 500000 UNITS: 100000 SUSPENSION ORAL at 10:03

## 2021-07-18 RX ADMIN — Medication 1 PACKET: at 13:29

## 2021-07-18 RX ADMIN — MIRTAZAPINE 7.5 MG: 7.5 TABLET ORAL at 00:14

## 2021-07-18 RX ADMIN — GLYCOPYRROLATE 1 MG: 1 TABLET ORAL at 10:03

## 2021-07-18 RX ADMIN — Medication 1 PACKET: at 10:03

## 2021-07-18 RX ADMIN — IPRATROPIUM BROMIDE AND ALBUTEROL SULFATE 3 ML: .5; 3 SOLUTION RESPIRATORY (INHALATION) at 14:10

## 2021-07-18 RX ADMIN — Medication 1 PACKET: at 22:20

## 2021-07-18 RX ADMIN — SODIUM CHLORIDE 30 MG/ML INHALATION SOLUTION 3 ML: 30 SOLUTION INHALANT at 07:43

## 2021-07-18 RX ADMIN — GLYCOPYRROLATE 1 MG: 1 TABLET ORAL at 21:50

## 2021-07-18 RX ADMIN — IPRATROPIUM BROMIDE AND ALBUTEROL SULFATE 3 ML: .5; 3 SOLUTION RESPIRATORY (INHALATION) at 07:31

## 2021-07-18 ASSESSMENT — MIFFLIN-ST. JEOR: SCORE: 1439.61

## 2021-07-18 NOTE — PLAN OF CARE
RT PROGRESS NOTE     DATA:     CURRENT SETTINGS:               TRACH TYPE / SIZE: #7 Bivona, placed 7/5/21             MODE:  HF 30L TM, cuff up   FIO2:  30%     ACTION:             THERAPIES: Duo neb x 2 Q6, Sodium Chloride neb BID given on scheduled time             SUCTION:                           FREQUENCY: 3                        AMOUNT: small/ mod/ copious                        CONSISTENCY: normal to thick                        COLOR:   white              SPONTANEOUS COUGH EFFORT/STRENGTH OF EFFORT (not elicited by suctioning): not observed                              WEANING PHASE:   2                        WEAN MODE: 30% HF 30L TM, cuff up                        WEAN TIME: TM cont since 0753                        END WEAN REASON:      RESPONSE:             BS:   coarse to slightly coarse  T/O - clear decreased after Sx              VITAL SIGNS: O2 sat 94-97%, HR 81-88, RR 20 on TM wean, RR 22 on PRVC/ AC vent             EMOTIONAL NEEDS / CONCERNS:  None               RISK FOR SELF DECANNULATION:  Yes                        RISK DUE TO: Confusion                        INTERVENTION/S IN PLACE IS/ARE: Restraints x2       NOTE / PLAN:  cont current plan of care - TM during the day with cuff inflated until 4AM, vent on the trest of the night with settings: PRVC/ AC 14, 450, +5, 30%.

## 2021-07-18 NOTE — PLAN OF CARE
Problem: Communication Impairment (Mechanical Ventilation, Invasive)  Goal: Effective Communication  Outcome: Improving     Vital signs stable. Pt denied pain. No PRNs given.  Pt's wife and daughter present for most of shift.   All care needs met.

## 2021-07-18 NOTE — PLAN OF CARE
Problem: Communication Impairment (Mechanical Ventilation, Invasive)  Goal: Effective Communication  7/17/2021 2019 by Kayla Small RT  Outcome: Improving  7/17/2021 0630 by Kayla Small RT  Outcome: Improving     Problem: Device-Related Complication Risk (Mechanical Ventilation, Invasive)  Goal: Optimal Device Function  7/17/2021 2019 by Kayla Small RT  Outcome: Improving  7/17/2021 0630 by Kayla Small RT  Outcome: Improving  Intervention: Optimize Device Care and Function  Recent Flowsheet Documentation  Taken 7/17/2021 1931 by Kayla Small RT  Airway Safety Measures:    all equipment/monitors on and audible    manual resuscitator/mask/valve in room    suction regulator    suction at bedside    suction equipment    oxygen flowmeter     Problem: Inability to Wean (Mechanical Ventilation, Invasive)  Goal: Mechanical Ventilation Liberation  7/17/2021 2019 by Kayla Small RT  Outcome: Improving  7/17/2021 0630 by Kayla Small RT  Outcome: Improving     Problem: Skin and Tissue Injury (Mechanical Ventilation, Invasive)  Goal: Absence of Device-Related Skin and Tissue Injury  7/17/2021 2019 by Kayla Small RT  Outcome: Improving  7/17/2021 0630 by Kayla Small RT  Outcome: Improving     Problem: Ventilator-Induced Lung Injury (Mechanical Ventilation, Invasive)  Goal: Absence of Ventilator-Induced Lung Injury  7/17/2021 2019 by Kayla Small RT  Outcome: Improving  7/17/2021 0630 by Kayla Small RT  Outcome: Improving

## 2021-07-18 NOTE — PROGRESS NOTES
RT PROGRESS NOTE    DATA  CURRENT SETTINGS --  Ventilation Mode: CMV/AC  (Continuous Mandatory Ventilation/ Assist Control) (PRVC A/C)  FiO2 (%): 30 %  Rate Set (breaths/minute): 14 breaths/min  Tidal Volume Set (mL): 450 mL  PEEP (cm H2O): 5 cmH2O  Oxygen Concentration (%): 30 %  Resp: 26  TRACH TYPE -- Bivona # 7 Cuffed (Placed 6/7)    ACTION  THERAPIES -- Q6 Duoneb; BID Mucomyst 20%; BID NaCl 3%  SUCTION   FREQUENCY -- 5 times    AMOUNT -- large   CONSISTENCY -- thick   COLOR -- yellow/pink   SPONTANEOUS COUGH -- Strong  WEAN PHASE #2   MODE -- HTD 30 LPM 30%   DURATION -- 0840 to 2335   END REASON -- Desaturation     RESPONSE  BREATH SOUNDS -- Clear to Coarse  VITAL SIGNS --  Temp:  [98.1  F (36.7  C)-98.4  F (36.9  C)] 98.4  F (36.9  C)  Pulse:  [80-95] 92  Resp:  [20-30] 26  BP: (115-136)/(71-79) 136/78  FiO2 (%):  [30 %-32 %] 30 %  SpO2:  [88 %-99 %] 99 %  EMOTIONAL CONCERNS -- No  RISK FOR SELF-DECANNULATION -- Yes; Mitt restraints in place    NOTE   We are attempting to continue the patient's high flow to trach later into the night time but he was unable to maintain his saturations despite suctioning. Will continue to attempt to make progress on his weaning trials. RT will continue to monitor.      Kayla Lux, RT

## 2021-07-18 NOTE — PLAN OF CARE
Problem: Adult Inpatient Plan of Care  Goal: Optimal Comfort and Wellbeing  Outcome: No Change   Patient slept on and off last night 5-6 hrs, suction thick green tracheal secretions. Vitals stable. He doesn't seem to be in any discomfort. Had one time gastric residual of 60cc. Will continue to monitor.

## 2021-07-18 NOTE — PROGRESS NOTES
Inguinal hernia appears to be larger than it previous was.  Primary MD text-paged to assess patient.  Hao Blackwood RN

## 2021-07-19 ENCOUNTER — APPOINTMENT (OUTPATIENT)
Dept: OCCUPATIONAL THERAPY | Facility: CLINIC | Age: 71
DRG: 207 | End: 2021-07-19
Attending: HOSPITALIST
Payer: COMMERCIAL

## 2021-07-19 ENCOUNTER — APPOINTMENT (OUTPATIENT)
Dept: SPEECH THERAPY | Facility: CLINIC | Age: 71
DRG: 207 | End: 2021-07-19
Attending: HOSPITALIST
Payer: COMMERCIAL

## 2021-07-19 LAB
ANION GAP SERPL CALCULATED.3IONS-SCNC: 11 MMOL/L (ref 5–18)
BASOPHILS # BLD AUTO: 0 10E3/UL (ref 0–0.2)
BASOPHILS NFR BLD AUTO: 0 %
BUN SERPL-MCNC: 18 MG/DL (ref 8–22)
CALCIUM SERPL-MCNC: 10.3 MG/DL (ref 8.5–10.5)
CHLORIDE BLD-SCNC: 102 MMOL/L (ref 98–107)
CO2 SERPL-SCNC: 28 MMOL/L (ref 22–31)
CREAT SERPL-MCNC: 0.53 MG/DL (ref 0.7–1.3)
EOSINOPHIL # BLD AUTO: 0.3 10E3/UL (ref 0–0.7)
EOSINOPHIL NFR BLD AUTO: 3 %
ERYTHROCYTE [DISTWIDTH] IN BLOOD BY AUTOMATED COUNT: 14.2 % (ref 10–15)
GFR SERPL CREATININE-BSD FRML MDRD: >90 ML/MIN/1.73M2
GLUCOSE BLD-MCNC: 128 MG/DL (ref 70–125)
GLUCOSE BLDC GLUCOMTR-MCNC: 130 MG/DL (ref 70–125)
HCT VFR BLD AUTO: 31.2 % (ref 40–53)
HGB BLD-MCNC: 10 G/DL (ref 13.3–17.7)
IMM GRANULOCYTES # BLD: 0.1 10E3/UL
IMM GRANULOCYTES NFR BLD: 1 %
LYMPHOCYTES # BLD AUTO: 1.3 10E3/UL (ref 0.8–5.3)
LYMPHOCYTES NFR BLD AUTO: 14 %
MCH RBC QN AUTO: 31.1 PG (ref 26.5–33)
MCHC RBC AUTO-ENTMCNC: 32.1 G/DL (ref 31.5–36.5)
MCV RBC AUTO: 97 FL (ref 78–100)
MONOCYTES # BLD AUTO: 0.6 10E3/UL (ref 0–1.3)
MONOCYTES NFR BLD AUTO: 7 %
NEUTROPHILS # BLD AUTO: 7.3 10E3/UL (ref 1.6–8.3)
NEUTROPHILS NFR BLD AUTO: 75 %
NRBC # BLD AUTO: 0 10E3/UL
NRBC BLD AUTO-RTO: 0 /100
PLATELET # BLD AUTO: 413 10E3/UL (ref 150–450)
POTASSIUM BLD-SCNC: 4.5 MMOL/L (ref 3.5–5)
RBC # BLD AUTO: 3.22 10E6/UL (ref 4.4–5.9)
SODIUM SERPL-SCNC: 141 MMOL/L (ref 136–145)
WBC # BLD AUTO: 9.7 10E3/UL (ref 4–11)

## 2021-07-19 PROCEDURE — 999N000009 HC STATISTIC AIRWAY CARE

## 2021-07-19 PROCEDURE — 250N000011 HC RX IP 250 OP 636

## 2021-07-19 PROCEDURE — 120N000017 HC R&B RESPIRATORY CARE

## 2021-07-19 PROCEDURE — 92507 TX SP LANG VOICE COMM INDIV: CPT | Mod: GN

## 2021-07-19 PROCEDURE — 97110 THERAPEUTIC EXERCISES: CPT | Mod: GO | Performed by: OCCUPATIONAL THERAPIST

## 2021-07-19 PROCEDURE — 97530 THERAPEUTIC ACTIVITIES: CPT | Mod: GO | Performed by: OCCUPATIONAL THERAPIST

## 2021-07-19 PROCEDURE — 999N000157 HC STATISTIC RCP TIME EA 10 MIN

## 2021-07-19 PROCEDURE — 250N000013 HC RX MED GY IP 250 OP 250 PS 637: Performed by: INTERNAL MEDICINE

## 2021-07-19 PROCEDURE — 94640 AIRWAY INHALATION TREATMENT: CPT

## 2021-07-19 PROCEDURE — 94003 VENT MGMT INPAT SUBQ DAY: CPT

## 2021-07-19 PROCEDURE — 250N000013 HC RX MED GY IP 250 OP 250 PS 637: Performed by: HOSPITALIST

## 2021-07-19 PROCEDURE — 94640 AIRWAY INHALATION TREATMENT: CPT | Mod: 76

## 2021-07-19 PROCEDURE — 80048 BASIC METABOLIC PNL TOTAL CA: CPT | Performed by: HOSPITALIST

## 2021-07-19 PROCEDURE — 250N000009 HC RX 250: Performed by: INTERNAL MEDICINE

## 2021-07-19 PROCEDURE — 250N000013 HC RX MED GY IP 250 OP 250 PS 637

## 2021-07-19 PROCEDURE — 99233 SBSQ HOSP IP/OBS HIGH 50: CPT | Performed by: INTERNAL MEDICINE

## 2021-07-19 PROCEDURE — 36415 COLL VENOUS BLD VENIPUNCTURE: CPT | Performed by: HOSPITALIST

## 2021-07-19 PROCEDURE — 85025 COMPLETE CBC W/AUTO DIFF WBC: CPT | Performed by: INTERNAL MEDICINE

## 2021-07-19 PROCEDURE — 99232 SBSQ HOSP IP/OBS MODERATE 35: CPT | Performed by: INTERNAL MEDICINE

## 2021-07-19 RX ADMIN — FAMOTIDINE 20 MG: 20 TABLET, FILM COATED ORAL at 20:58

## 2021-07-19 RX ADMIN — SODIUM CHLORIDE 30 MG/ML INHALATION SOLUTION 3 ML: 30 SOLUTION INHALANT at 20:21

## 2021-07-19 RX ADMIN — HEPARIN SODIUM 5000 UNITS: 5000 INJECTION, SOLUTION INTRAVENOUS; SUBCUTANEOUS at 21:02

## 2021-07-19 RX ADMIN — IPRATROPIUM BROMIDE AND ALBUTEROL SULFATE 3 ML: .5; 3 SOLUTION RESPIRATORY (INHALATION) at 20:21

## 2021-07-19 RX ADMIN — HEPARIN SODIUM 5000 UNITS: 5000 INJECTION, SOLUTION INTRAVENOUS; SUBCUTANEOUS at 14:31

## 2021-07-19 RX ADMIN — SODIUM CHLORIDE 30 MG/ML INHALATION SOLUTION 3 ML: 30 SOLUTION INHALANT at 09:08

## 2021-07-19 RX ADMIN — ANORECTAL OINTMENT: 15.7; .44; 24; 20.6 OINTMENT TOPICAL at 08:33

## 2021-07-19 RX ADMIN — GLYCOPYRROLATE 1 MG: 1 TABLET ORAL at 14:31

## 2021-07-19 RX ADMIN — NYSTATIN 500000 UNITS: 100000 SUSPENSION ORAL at 18:35

## 2021-07-19 RX ADMIN — GLYCOPYRROLATE 1 MG: 1 TABLET ORAL at 20:58

## 2021-07-19 RX ADMIN — VENLAFAXINE 25 MG: 25 TABLET ORAL at 18:35

## 2021-07-19 RX ADMIN — IPRATROPIUM BROMIDE AND ALBUTEROL SULFATE 3 ML: .5; 3 SOLUTION RESPIRATORY (INHALATION) at 09:03

## 2021-07-19 RX ADMIN — ANORECTAL OINTMENT: 15.7; .44; 24; 20.6 OINTMENT TOPICAL at 14:31

## 2021-07-19 RX ADMIN — METHYLPHENIDATE HYDROCHLORIDE 10 MG: 10 TABLET ORAL at 06:38

## 2021-07-19 RX ADMIN — IPRATROPIUM BROMIDE AND ALBUTEROL SULFATE 3 ML: .5; 3 SOLUTION RESPIRATORY (INHALATION) at 02:11

## 2021-07-19 RX ADMIN — HEPARIN SODIUM 5000 UNITS: 5000 INJECTION, SOLUTION INTRAVENOUS; SUBCUTANEOUS at 06:32

## 2021-07-19 RX ADMIN — NYSTATIN 500000 UNITS: 100000 SUSPENSION ORAL at 14:30

## 2021-07-19 RX ADMIN — NYSTATIN 500000 UNITS: 100000 SUSPENSION ORAL at 08:32

## 2021-07-19 RX ADMIN — ANORECTAL OINTMENT: 15.7; .44; 24; 20.6 OINTMENT TOPICAL at 20:58

## 2021-07-19 RX ADMIN — MIRTAZAPINE 7.5 MG: 7.5 TABLET ORAL at 20:58

## 2021-07-19 RX ADMIN — Medication 1 PACKET: at 14:39

## 2021-07-19 RX ADMIN — VENLAFAXINE 25 MG: 25 TABLET ORAL at 11:50

## 2021-07-19 RX ADMIN — FAMOTIDINE 20 MG: 20 TABLET, FILM COATED ORAL at 08:32

## 2021-07-19 RX ADMIN — GLYCOPYRROLATE 1 MG: 1 TABLET ORAL at 08:32

## 2021-07-19 RX ADMIN — ACETYLCYSTEINE 2 ML: 200 SOLUTION ORAL; RESPIRATORY (INHALATION) at 09:03

## 2021-07-19 RX ADMIN — NYSTATIN 500000 UNITS: 100000 SUSPENSION ORAL at 20:57

## 2021-07-19 RX ADMIN — VENLAFAXINE 25 MG: 25 TABLET ORAL at 08:32

## 2021-07-19 RX ADMIN — LISINOPRIL 40 MG: 20 TABLET ORAL at 18:35

## 2021-07-19 RX ADMIN — IPRATROPIUM BROMIDE AND ALBUTEROL SULFATE 3 ML: .5; 3 SOLUTION RESPIRATORY (INHALATION) at 13:55

## 2021-07-19 RX ADMIN — Medication 1 PACKET: at 08:32

## 2021-07-19 RX ADMIN — Medication 1 PACKET: at 20:58

## 2021-07-19 ASSESSMENT — MIFFLIN-ST. JEOR: SCORE: 1440.51

## 2021-07-19 NOTE — PLAN OF CARE
Problem: Communication Impairment (Mechanical Ventilation, Invasive)  Goal: Effective Communication  Outcome: Improving   Patient was on Trach dome throughout the shift, tolerated it well, No sign of respiratory distress  or pain noted on this shift, patient was calm and co perative.

## 2021-07-19 NOTE — PLAN OF CARE
Problem: Communication Impairment (Mechanical Ventilation, Invasive)  Goal: Effective Communication  Outcome: Improving  Communication limited by trach tube, continues to wean on trach mask, effort made to lip read, anticipate needs and offer assistance promptly.

## 2021-07-19 NOTE — PLAN OF CARE
Plan of Care by Jacqueline Vieyra RN at 2021  9:23 AM     Author: Jacqueline Vieyra RN Service: -- Author Type: RN, Care Manager    Filed: 2021  9:33 AM Date of Service: 2021  9:23 AM Status: Signed    : Jacqueline Vieyra RN (RN, Care Manager)       Care Management Progression of Care Update        DR GLOS - Target D/C Date TBD        PLAN/GOALS  1. Pulmonary following. Phase 2 wean~Heated Trach hi rosemarie 30L/35% Fi02 days. Vent at night settings PRVC/AC.  Duo-neb six times a day for pulmonary hygiene.    2.  Nutrition management.  Tube feeding via PEG placed 21.  Registered dietician to monitor tube feeding tolerance, weight and labs.    3.  Neurology following intermittently.    4. PT/OT 5x/week.     5.  Speech therapy 3x/week. Participate in bedside swallow study when consistently tolerating speaking valve on trach mask. Repeat blue dye test.    6.  Palliative following.           BARRIERS  1.  Acute hypoxic respiratory failure due to subarachnoid hemorrhage.  Vent / trach / wean.    2.  Acute metabolic encephalopathy.    3.  Oropharyngeal dysphagia.    4.   Bilateral soft wrist restraints for pulling at tubes and lines..        Disposition: TCU vs. Acute Rehab  Care Manager Name:  Jacqueline Vieyra RN,BSN, HNB-BC    Date/Time:  21 @ 11:31 AM

## 2021-07-19 NOTE — PROGRESS NOTES
RT PROGRESS NOTE    DATA  CURRENT SETTINGS --   Ventilation Mode: CMV/AC  (Continuous Mandatory Ventilation/ Assist Control)  FiO2 (%): 30 %  Rate Set (breaths/minute): 14 breaths/min  Tidal Volume Set (mL): 450 mL  PEEP (cm H2O): 5 cmH2O  Oxygen Concentration (%): 30 %  Resp: 24  TRACH TYPE -- Bivona #7 Cuffed (Placed 6/7; Last Changed 7/5)     ACTION  THERAPY -- Q6 Duoneb; BID NaCl 3%; BID Mucomyst 20%  SUCTION --   FREQUENCY -- 1 to 2   AMOUNT -- small to moderate   CONSISTENCY -- thick    COLOR -- white/yellow   SPONTANEOUS COUGH -- good  WEAN PHASE #2   MODE -- HTD 30 LPM 30%   DURATION -- 0753 to Current   END REASON -- N/A    RESPONSE  BREATH SOUNDS -- clear   VITAL SIGNS --  Temp:  [97.8  F (36.6  C)-99.3  F (37.4  C)] 99.3  F (37.4  C)  Pulse:  [82-92] 92  Resp:  [24-26] 24  BP: (111-122)/(62-81) 111/62  FiO2 (%):  [30 %] 30 %  SpO2:  [93 %-100 %] 97 %  EMOTIONAL CONCERNS -- No  RISK FOR SELF-DECANNULATION -- Yes; Mitt Restraints in Place     NOTE   No signs of respiratory distress. RT will continue to monitor.     Kayla Lux, RT

## 2021-07-19 NOTE — SIGNIFICANT EVENT
Right wrist and Left wrist restraints continued 7/18/2021    Clinical Justification: Pulling lines, pulling tubes, and pulling equipment  Less Restrictive Alternative: Repositioning, Re-evaluate equipment, Reorientation  Attending Physician Notified: MD ordered restraint, Attending Physician's Name: Dr. Granda   New orders placed Yes  Length of Order: 1 Day      Nolan Overton MD

## 2021-07-19 NOTE — PROGRESS NOTES
[facility]:  LTACH PULMONARY PROGRESS NOTE     Date / Time of Admission:  6/11/2021  4:36 PM    ID: Dayron Neri is a 70 year old with essential hypertension who was admitted to an outside facility on 5/15/2021 with subarachnoid hemorrhage with complications of post procedure respiratory failure, status post tracheostomy and PEG tube placement on 6/7/2021; Klebsiella pneumonia, Pseudomonas pneumonia with ESBL.      Reason for Consult:  Respiratory failure         Assessment: 1.   Subarachnoid bleed status post craniotomy and clipping P-comm ruptured aneurysm.    2. Chronic encephalopathy secondary to above.    3. Acute respiratory failure with hypoxia/hypercapnia status post tracheostomy 6/7/2021. Transition to Bivona 7 on 7/5/2021.  4. Recent pneumonia with Pseudomonas ESBL and 6/19/21 and Klebsiella 7/9/21.  Of note, was treated with tobramycin nebulizer due to concern for colonization and underlying Pseudomonas ESBL.  Completed treatment with tobramycin nebs 7/9.  Antibiotics completed per ID.   5. Essential hypertension.    6. Malnourishment with dysphagia status post G-tube placement 6/7/2021.           Plan:     Clinical status discussed today with respiratory therapist    Tracheal secretions: White/yellow secretions, thicker    Continue phase 2 weaning as tolerates     Phase 2:  Trach mask wean as tolerated. O2 to keep sats greater than or equal to 92%.     Keeping on TM today; holding off on vent at night unless needed or respiratory distress.     Speaking Valve (SV) trials with supervision.     Follow-up with VBG in AM given patient off vent overnight.    Change trach every 30 days - last changed 7/5    Speech therapy eval appreciated - I ordered consult for blue dye study    Diagnostic testing: Last chest x-ray 7/15    Pulmonary toilet: Continue scheduled DuoNebs every 6 hours, hypertonic saline nebs 2x/day    Discontinue Robinul q8H and also mucomyst nebs Q12H as I feel they are  "competing with each other. If secretions are too thick after this change, will restart mucomyst nebs.    Patient and/or family were educated on the above recommendations.   Thank you for allowing our service to participate in the care of this patient.  Please call with any questions or concerns.    Total patient care time: 35 minutes, with over 50% spent in counseling/coordination of care.      Rehana Kauffman MD, MPH  Pulmonary/Critical Care Medicine  07/19/2021  3:12 PM        Subjective/Interval History:   Last night was 1st night off vent overnight per RT.   Today, 30 min speaking valve.  Speech therapy concerned for aspiration, requests blue dye test  [facility]  Staff suctioning intermittently, secretions thicker    Yesterday: On trach mask since 0753 hr on 7/18/21    Review of Systems:  Pertinent items are noted in HPI.        Allergies/Medications:   Allergies:   No Known Allergies    Continuous Infusions:    sodium chloride       Scheduled Medications:    famotidine  20 mg Oral or Feeding Tube BID     fiber modular (NUTRISOURCE FIBER)  1 packet Per Feeding Tube TID     glycopyrrolate  1 mg Oral TID     heparin ANTICOAGULANT  5,000 Units Subcutaneous Q8H     ipratropium - albuterol 0.5 mg/2.5 mg/3 mL  3 mL Nebulization Q6H     lisinopril  40 mg Oral or Feeding Tube Daily     menthol-zinc oxide   Topical TID     methylphenidate  10 mg Oral or Feeding Tube QAM AC     mirtazapine  7.5 mg Per Feeding Tube At Bedtime     nystatin  500,000 Units Mouth/Throat 4x Daily     sodium chloride  3 mL Nebulization BID     sodium chloride (PF)  3 mL Intracatheter Q8H     venlafaxine  25 mg Per Feeding Tube TID w/meals          Objective:   Vitals:  /80 (BP Location: Left arm)   Pulse 82   Temp 97.9  F (36.6  C) (Axillary)   Resp 22   Ht 1.803 m (5' 10.98\")   Wt 64.9 kg (143 lb)   SpO2 95%   BMI 19.95 kg/m    Vent:  Ventilation Mode: Trach collar  FiO2 (%): 30 %  Rate Set " (breaths/minute): 14 breaths/min  Tidal Volume Set (mL): 450 mL  PEEP (cm H2O): 5 cmH2O  Oxygen Concentration (%): 30 %  Resp: 22    GEN: Awake, answering questions (nodding head) but slow to respond.  Wearing hand mitts.   HEENT: Temporal wasting bilaterally.  Normocephalic, atraumatic.  Extraoccular eye movements intact, anicteric sclera. No sinus tenderness to palpation.  Moist mucous membranes.   NECK: Supple.  7 Bivona, on trach mask.  PULM: Non-labored breathing.  No use of accessory muscles.  Clear to auscultation.  CVS: Regular rate and rhythm.  Normal S1, S2.  No rubs, murmurs, or gallops.    ABDOMEN: Normoactive bowel sounds.  Non-tender to palpation.  Non-distended.    EXTREMITES:  No clubbing, cyanosis, or edema.    NEURO:  Awake, moving upper extremities, mouthing responses    Intake/Output:  I/O last 3 completed shifts:  In: 3058 [I.V.:3; NG/GT:3055]  Out: 2050 [Urine:2050]        Pertinent Studies:   All laboratory data reviewed  Serum Glucose range:   Recent Labs   Lab 07/19/21  0615 07/16/21  0543 07/14/21  0626 07/13/21  1012   * 133* 124 133*     ABG: No lab results found in last 7 days.  CBC:   Recent Labs   Lab 07/19/21  0615 07/16/21  0543 07/13/21  1012   WBC 9.7 9.1 8.4   HGB 10.0* 10.3* 9.7*   HCT 31.2* 31.3* 31.0*   MCV 97 95 99    346 285   NEUTROPHIL 75 72 73   LYMPH 14 14 15   MONOCYTE 7 8 7   EOSINOPHIL 3 4 3     Chemistry:   Recent Labs   Lab 07/19/21  0615 07/16/21  0543 07/14/21  0626    140 144   POTASSIUM 4.5 4.0 4.1   CHLORIDE 102 100 105   CO2 28 30 29   BUN 18 24* 24*   CR 0.53* 0.58* 0.57*   GFRESTIMATED >90 >90 >90   RAGINI 10.3 10.1 10.5     Coags:  No results for input(s): INR, PROTIME, PTT in the last 168 hours.    Invalid input(s): APTT  Cardiac Markers:  No results for input(s): CKTOTAL, TROPONINI in the last 168 hours.     Microbiology:    Sputum culture, 7/9:  2+ Klebsiella oxytoca    ET aspirate, 6/19: Pseudomonas aeruginosa    Blood cultures x 2, 7/9:   No growth        Cardiology/Radiology:   Cardiac: All cardiac studies reviewed by me.    TTE, 5/27:  Global and regional left ventricular function is normal with an EF of 60-65%.  Global right ventricular function is normal. The peak velocity of the tricuspid regurgitant jet is not obtainable. Pulmonary artery systolic pressure cannot be assessed. IVC diameter and respiratory changes fall into an intermediate range suggesting an RA pressure of 8 mmHg.    Radiology: All imaging studies reviewed by me.    Chest X-Ray, 7/15:  No change in tracheostomy tube. Heart and mediastinal size are stable. There is indistinctness of the pulmonary interstitium, representing either airway inflammation or edema. There is bandlike opacity involving the retrocardiac region of the left lung base, increased from prior study and representing either atelectasis or infectious process. No pleural effusion or pneumothorax. There is some mild elevation of the right hemidiaphragm.

## 2021-07-19 NOTE — PLAN OF CARE
Problem: Anxiety  Goal: Anxiety Reduction or Resolution  Outcome: Improving   Patient was calm and cooperative with cares no signs of anxiety noted. Patient's vital signs were stable. No PRN given this shift. All due cares and medications given.

## 2021-07-19 NOTE — PROGRESS NOTES
Hospitalist Medicine Progress Note         Dayron Neri is a 70 year old gentleman with hypertension, CVA, who lives with his wife at home prehospitalization who presented to [facility] 5/15/2021 with altered mental status following a fall and dilated right pupil with systolic blood pressure of 200.  He was diagnosed with acute subarachnoid hemorrhage and underwent placement of EVD.  Angiogram confirmed ruptured posterior communicating artery aneurysm for which he underwent a craniotomy with clipping of PCOM aneurysm 5/28/2021 subsequent CTA showed bilateral BAYLEE infarct.  He was treated for elevated blood pressures with milrinone and verapamil.  Extubated 6/3/2021 to be reintubated 6/6/2021 due to drop in his oxygen saturations at which time he underwent tracheostomy and feeding tube placement 6/7/2021.  EVD was removed 6/9/2021 and was transferred to LTAC 6/11/2021 continue to be on the ventilator.  He was diagnosed with Klebsiella VAP ventilator associated pneumonia.  He did wean for greater than 18 hours 7/15/2021       Date of Admission:  6/11/2021  Assessment & Plan     Acute hypoxic respiratory failure  Ventilator associated pneumonia - Antibiotics discontinued 7/18/2021  Traumatic brain injury  Intracranial hemorrhage  Insomnia  Dysphagia -on tube feedings. Speech wants an another Blue Dye Test to evaluate for aspiration   Left indirect inguinal hernia -reducible          Plan:   Wife wants a better plan for co-codination care especially related to swallowing   Monitor WBC with discontinuation josé miguel Antibiotics     Diet: Adult Formula Drip Feeding: Continuous Osmolite 1.5; Gastrostomy; Goal Rate: 90; mL/hr; Medication - Feeding Tube Flush Frequency: 240 mL q 4 hours water flushes; Amount to Send (Nutrition use): send 3 packets Nutrisource Fiber daily; Tube Feeding...    DVT Prophylaxis: Heparin SQ  Gutierrez Catheter: Not present  Code Status: Full Code           Disposition Plan   Unknown at this  time    The patient's care was discussed with the Patient and his wife    Daniel Melara MD  Hospitalist Service  [facility]    ______________________________________________________________________    Interval History     Symptoms   Patient does not speak - still in mittens     Review of Systems:   Unable to get any history from the patient    Data reviewed today: I reviewed all medications, new labs and imaging results over the last 24 hours.     Physical Exam   Vital Signs: Temp: 97.9  F (36.6  C) Temp src: Axillary BP: 124/80 Pulse: 79   Resp: 18 SpO2: 98 % O2 Device: Trach dome Oxygen Delivery: 30 LPM  Weight: 143 lbs 0 oz      GENERAL: Patient is not in acute distress  HEENT:  EOM+,Conjunctiva is clear    NECK: no Jugular Venous distention  HEART: S1 S2  regular Rate and Rhythm,  there is   no murmur,   LUNGS: Respirations are not laboured, Lungs are   clear to auscultation  without Crepitations or Wheezing   ABDOMEN: Soft  , there is no tenderness ,Bowel Sounds are Positive   Hernia on the left side is indirect and is reducible  LOWER LIMBS: no Pedal Edema Bilaterally   CNS:   Alert, unable to fully acknowledge orientation as patient has speech difficulty    Data   Recent Labs   Lab 07/19/21  0615 07/16/21  0543 07/14/21  0626 07/13/21  1012   WBC 9.7 9.1  --  8.4   HGB 10.0* 10.3*  --  9.7*   MCV 97 95  --  99    346  --  285    140 144 145   POTASSIUM 4.5 4.0 4.1 4.4   CHLORIDE 102 100 105 105   CO2 28 30 29 27   BUN 18 24* 24* 23*   CR 0.53* 0.58* 0.57* 0.54*   ANIONGAP 11 10 10 13   RAGINI 10.3 10.1 10.5 10.7*   * 133* 124 133*     .restrain    No results found for this or any previous visit (from the past 24 hour(s)).     Within an hour after restraint an in person face to face assessment was completed at [facility], including an evaluation of the patient's immediate reaction to the intervention, behavioral assessment and review/assessment of  history, drugs and medications, recent labs, etc., and behavioral condition.  The patient experienced: No adverse physical outcome from seclusion/restraint initiation.  The intervention of restraint or seclusion needs to continue    Dr. Daniel VALADEZ.  Hospitalist [employer], [place].   .

## 2021-07-19 NOTE — PLAN OF CARE
Problem: Communication Impairment (Mechanical Ventilation, Invasive)  Goal: Effective Communication  Outcome: Improving     Problem: Device-Related Complication Risk (Mechanical Ventilation, Invasive)  Goal: Optimal Device Function  Outcome: Improving  Intervention: Optimize Device Care and Function  Recent Flowsheet Documentation  Taken 7/19/2021 0017 by Kayla Small, RT  Airway Safety Measures:   all equipment/monitors on and audible   manual resuscitator/mask/valve in room   suction at bedside   suction regulator   suction equipment   oxygen flowmeter  Taken 7/18/2021 1912 by Kayla Small, RT  Airway Safety Measures:   all equipment/monitors on and audible   suction at bedside   manual resuscitator/mask/valve in room   suction regulator   suction equipment   oxygen flowmeter     Problem: Inability to Wean (Mechanical Ventilation, Invasive)  Goal: Mechanical Ventilation Liberation  Outcome: Improving     Problem: Skin and Tissue Injury (Mechanical Ventilation, Invasive)  Goal: Absence of Device-Related Skin and Tissue Injury  Outcome: Improving     Problem: Ventilator-Induced Lung Injury (Mechanical Ventilation, Invasive)  Goal: Absence of Ventilator-Induced Lung Injury  Outcome: Improving

## 2021-07-20 ENCOUNTER — APPOINTMENT (OUTPATIENT)
Dept: PHYSICAL THERAPY | Facility: CLINIC | Age: 71
DRG: 207 | End: 2021-07-20
Attending: HOSPITALIST
Payer: COMMERCIAL

## 2021-07-20 ENCOUNTER — APPOINTMENT (OUTPATIENT)
Dept: SPEECH THERAPY | Facility: CLINIC | Age: 71
DRG: 207 | End: 2021-07-20
Attending: INTERNAL MEDICINE
Payer: COMMERCIAL

## 2021-07-20 ENCOUNTER — APPOINTMENT (OUTPATIENT)
Dept: OCCUPATIONAL THERAPY | Facility: CLINIC | Age: 71
DRG: 207 | End: 2021-07-20
Attending: HOSPITALIST
Payer: COMMERCIAL

## 2021-07-20 LAB
BASE EXCESS BLDV CALC-SCNC: 11 MMOL/L
BASOPHILS # BLD AUTO: 0.1 10E3/UL (ref 0–0.2)
BASOPHILS NFR BLD AUTO: 0 %
EOSINOPHIL # BLD AUTO: 0.3 10E3/UL (ref 0–0.7)
EOSINOPHIL NFR BLD AUTO: 2 %
ERYTHROCYTE [DISTWIDTH] IN BLOOD BY AUTOMATED COUNT: 14.2 % (ref 10–15)
HCO3 BLDV-SCNC: 33 MMOL/L (ref 24–30)
HCT VFR BLD AUTO: 31.4 % (ref 40–53)
HGB BLD-MCNC: 10.3 G/DL (ref 13.3–17.7)
HOLD SPECIMEN: NORMAL
IMM GRANULOCYTES # BLD: 0.1 10E3/UL
IMM GRANULOCYTES NFR BLD: 1 %
LACTATE SERPL-SCNC: 1.4 MMOL/L (ref 0.7–2)
LYMPHOCYTES # BLD AUTO: 1.4 10E3/UL (ref 0.8–5.3)
LYMPHOCYTES NFR BLD AUTO: 10 %
MCH RBC QN AUTO: 31.5 PG (ref 26.5–33)
MCHC RBC AUTO-ENTMCNC: 32.8 G/DL (ref 31.5–36.5)
MCV RBC AUTO: 96 FL (ref 78–100)
MONOCYTES # BLD AUTO: 0.8 10E3/UL (ref 0–1.3)
MONOCYTES NFR BLD AUTO: 6 %
NEUTROPHILS # BLD AUTO: 11.1 10E3/UL (ref 1.6–8.3)
NEUTROPHILS NFR BLD AUTO: 81 %
NRBC # BLD AUTO: 0 10E3/UL
NRBC BLD AUTO-RTO: 0 /100
OXYHGB MFR BLDV: 94.2 % (ref 70–75)
PCO2 BLDV: 53 MM HG (ref 35–50)
PH BLDV: 7.43 [PH] (ref 7.35–7.45)
PLATELET # BLD AUTO: 462 10E3/UL (ref 150–450)
PO2 BLDV: 77 MM HG (ref 25–47)
RBC # BLD AUTO: 3.27 10E6/UL (ref 4.4–5.9)
SAO2 % BLDV: 95.5 % (ref 70–75)
SARS-COV-2 RNA RESP QL NAA+PROBE: NEGATIVE
WBC # BLD AUTO: 12 10E3/UL (ref 4–11)
WBC # BLD AUTO: 13.7 10E3/UL (ref 4–11)

## 2021-07-20 PROCEDURE — 36415 COLL VENOUS BLD VENIPUNCTURE: CPT | Performed by: INTERNAL MEDICINE

## 2021-07-20 PROCEDURE — 82805 BLOOD GASES W/O2 SATURATION: CPT | Performed by: INTERNAL MEDICINE

## 2021-07-20 PROCEDURE — 92526 ORAL FUNCTION THERAPY: CPT | Mod: GN

## 2021-07-20 PROCEDURE — 85025 COMPLETE CBC W/AUTO DIFF WBC: CPT | Performed by: INTERNAL MEDICINE

## 2021-07-20 PROCEDURE — 250N000011 HC RX IP 250 OP 636

## 2021-07-20 PROCEDURE — 94640 AIRWAY INHALATION TREATMENT: CPT | Mod: 76

## 2021-07-20 PROCEDURE — 85048 AUTOMATED LEUKOCYTE COUNT: CPT | Performed by: NURSE PRACTITIONER

## 2021-07-20 PROCEDURE — 94640 AIRWAY INHALATION TREATMENT: CPT

## 2021-07-20 PROCEDURE — 83605 ASSAY OF LACTIC ACID: CPT | Performed by: INTERNAL MEDICINE

## 2021-07-20 PROCEDURE — 120N000017 HC R&B RESPIRATORY CARE

## 2021-07-20 PROCEDURE — 999N000157 HC STATISTIC RCP TIME EA 10 MIN

## 2021-07-20 PROCEDURE — 250N000009 HC RX 250: Performed by: INTERNAL MEDICINE

## 2021-07-20 PROCEDURE — 250N000013 HC RX MED GY IP 250 OP 250 PS 637: Performed by: INTERNAL MEDICINE

## 2021-07-20 PROCEDURE — 250N000013 HC RX MED GY IP 250 OP 250 PS 637

## 2021-07-20 PROCEDURE — 99233 SBSQ HOSP IP/OBS HIGH 50: CPT | Performed by: NURSE PRACTITIONER

## 2021-07-20 PROCEDURE — 999N000009 HC STATISTIC AIRWAY CARE

## 2021-07-20 PROCEDURE — 97530 THERAPEUTIC ACTIVITIES: CPT | Mod: GO | Performed by: OCCUPATIONAL THERAPIST

## 2021-07-20 PROCEDURE — 97112 NEUROMUSCULAR REEDUCATION: CPT | Mod: GP | Performed by: PHYSICAL THERAPIST

## 2021-07-20 PROCEDURE — 99233 SBSQ HOSP IP/OBS HIGH 50: CPT | Performed by: INTERNAL MEDICINE

## 2021-07-20 PROCEDURE — 92507 TX SP LANG VOICE COMM INDIV: CPT | Mod: GN

## 2021-07-20 PROCEDURE — 87635 SARS-COV-2 COVID-19 AMP PRB: CPT | Performed by: INTERNAL MEDICINE

## 2021-07-20 PROCEDURE — 250N000013 HC RX MED GY IP 250 OP 250 PS 637: Performed by: HOSPITALIST

## 2021-07-20 PROCEDURE — 97530 THERAPEUTIC ACTIVITIES: CPT | Mod: GP | Performed by: PHYSICAL THERAPIST

## 2021-07-20 PROCEDURE — 999N000123 HC STATISTIC OXYGEN O2DAILY TECH TIME

## 2021-07-20 RX ORDER — VENLAFAXINE 37.5 MG/1
37.5 TABLET ORAL
Status: DISCONTINUED | OUTPATIENT
Start: 2021-07-20 | End: 2021-08-18

## 2021-07-20 RX ORDER — GLYCOPYRROLATE 1 MG/1
1 TABLET ORAL 3 TIMES DAILY
Status: DISCONTINUED | OUTPATIENT
Start: 2021-07-20 | End: 2021-07-20

## 2021-07-20 RX ORDER — SCOLOPAMINE TRANSDERMAL SYSTEM 1 MG/1
1 PATCH, EXTENDED RELEASE TRANSDERMAL
Status: DISCONTINUED | OUTPATIENT
Start: 2021-07-20 | End: 2021-09-27 | Stop reason: HOSPADM

## 2021-07-20 RX ADMIN — Medication 1 PACKET: at 14:23

## 2021-07-20 RX ADMIN — LISINOPRIL 40 MG: 20 TABLET ORAL at 18:17

## 2021-07-20 RX ADMIN — VENLAFAXINE 25 MG: 25 TABLET ORAL at 08:08

## 2021-07-20 RX ADMIN — VENLAFAXINE 37.5 MG: 37.5 TABLET ORAL at 18:17

## 2021-07-20 RX ADMIN — ANORECTAL OINTMENT: 15.7; .44; 24; 20.6 OINTMENT TOPICAL at 13:17

## 2021-07-20 RX ADMIN — IPRATROPIUM BROMIDE AND ALBUTEROL SULFATE 3 ML: .5; 3 SOLUTION RESPIRATORY (INHALATION) at 20:09

## 2021-07-20 RX ADMIN — GLYCOPYRROLATE 1 MG: 1 TABLET ORAL at 13:16

## 2021-07-20 RX ADMIN — IPRATROPIUM BROMIDE AND ALBUTEROL SULFATE 3 ML: .5; 3 SOLUTION RESPIRATORY (INHALATION) at 07:43

## 2021-07-20 RX ADMIN — FAMOTIDINE 20 MG: 20 TABLET, FILM COATED ORAL at 08:08

## 2021-07-20 RX ADMIN — ANORECTAL OINTMENT: 15.7; .44; 24; 20.6 OINTMENT TOPICAL at 21:41

## 2021-07-20 RX ADMIN — SCOPALAMINE 1 PATCH: 1 PATCH, EXTENDED RELEASE TRANSDERMAL at 14:23

## 2021-07-20 RX ADMIN — NYSTATIN 500000 UNITS: 100000 SUSPENSION ORAL at 13:16

## 2021-07-20 RX ADMIN — METHYLPHENIDATE HYDROCHLORIDE 10 MG: 10 TABLET ORAL at 06:44

## 2021-07-20 RX ADMIN — HEPARIN SODIUM 5000 UNITS: 5000 INJECTION, SOLUTION INTRAVENOUS; SUBCUTANEOUS at 05:52

## 2021-07-20 RX ADMIN — NYSTATIN 500000 UNITS: 100000 SUSPENSION ORAL at 08:08

## 2021-07-20 RX ADMIN — NYSTATIN 500000 UNITS: 100000 SUSPENSION ORAL at 18:17

## 2021-07-20 RX ADMIN — SODIUM CHLORIDE 30 MG/ML INHALATION SOLUTION 3 ML: 30 SOLUTION INHALANT at 07:44

## 2021-07-20 RX ADMIN — Medication 1 PACKET: at 08:18

## 2021-07-20 RX ADMIN — HEPARIN SODIUM 5000 UNITS: 5000 INJECTION, SOLUTION INTRAVENOUS; SUBCUTANEOUS at 13:16

## 2021-07-20 RX ADMIN — FAMOTIDINE 20 MG: 20 TABLET, FILM COATED ORAL at 21:40

## 2021-07-20 RX ADMIN — MIRTAZAPINE 7.5 MG: 7.5 TABLET ORAL at 21:40

## 2021-07-20 RX ADMIN — IPRATROPIUM BROMIDE AND ALBUTEROL SULFATE 3 ML: .5; 3 SOLUTION RESPIRATORY (INHALATION) at 14:36

## 2021-07-20 RX ADMIN — NYSTATIN 500000 UNITS: 100000 SUSPENSION ORAL at 21:40

## 2021-07-20 RX ADMIN — IPRATROPIUM BROMIDE AND ALBUTEROL SULFATE 3 ML: .5; 3 SOLUTION RESPIRATORY (INHALATION) at 02:59

## 2021-07-20 RX ADMIN — GLYCOPYRROLATE 1 MG: 1 TABLET ORAL at 08:08

## 2021-07-20 RX ADMIN — VENLAFAXINE 37.5 MG: 37.5 TABLET ORAL at 14:22

## 2021-07-20 RX ADMIN — HEPARIN SODIUM 5000 UNITS: 5000 INJECTION, SOLUTION INTRAVENOUS; SUBCUTANEOUS at 21:40

## 2021-07-20 RX ADMIN — Medication 1 PACKET: at 21:44

## 2021-07-20 RX ADMIN — ANORECTAL OINTMENT: 15.7; .44; 24; 20.6 OINTMENT TOPICAL at 08:09

## 2021-07-20 NOTE — PLAN OF CARE
Problem: Communication Impairment (Mechanical Ventilation, Invasive)  Goal: Effective Communication  Outcome: Improving     Problem: Device-Related Complication Risk (Mechanical Ventilation, Invasive)  Goal: Optimal Device Function  Outcome: Improving     Problem: Inability to Wean (Mechanical Ventilation, Invasive)  Goal: Mechanical Ventilation Liberation  Outcome: Improving    RT PROGRESS NOTE     DATA:     CURRENT SETTINGS:  PRVC/AC 14, 450, +5, 30% (STBY)             TRACH TYPE / SIZE: # 7 BIVONA TTS Changed on 7/5/21             MODE:  HFTM/cuff up             FIO2:  30%@30 L/MIN     ACTION:             THERAPIES: Duoneb q6hr , Sodium chloride neb two times a day               SUCTION:                           FREQUENCY: x6                        AMOUNT: small to copious                         CONSISTENCY: thick/thin                        COLOR: pale yellow/ Blue -from blue dye aspiration               SPONTANEOUS COUGH EFFORT/STRENGTH OF EFFORT (not elicited by suctioning): Fair                               WEANING PHASE: 2                        WEAN MODE: 30% @30 L/MIN                        WEAN TIME:  Cont.                        END WEAN REASON:       RESPONSE:             BS:  Coarse              VITAL SIGNS: sat 97-98%, HR 86-89, RR 20-22              EMOTIONAL NEEDS / CONCERNS:confused               RISK FOR SELF DECANNULATION: yes                        RISK DUE TO:  Confused                         INTERVENTION/S IN PLACE IS/ARE: Bilateral mittens       NOTE / PLAN:  Cont. Current plan of care /cont. Off the vent

## 2021-07-20 NOTE — PLAN OF CARE
Problem: Device-Related Complication Risk (Mechanical Ventilation, Invasive)  Goal: Optimal Device Function  Outcome: No Change  Intervention: Optimize Device Care and Function  Recent Flowsheet Documentation  Taken 7/20/2021 0300 by Timoteo Tran RT  Airway Safety Measures: all equipment/monitors on and audible     Problem: Inability to Wean (Mechanical Ventilation, Invasive)  Goal: Mechanical Ventilation Liberation  Outcome: No Change     Problem: Skin and Tissue Injury (Mechanical Ventilation, Invasive)  Goal: Absence of Device-Related Skin and Tissue Injury  Outcome: No Change     Problem: Ventilator-Induced Lung Injury (Mechanical Ventilation, Invasive)  Goal: Absence of Ventilator-Induced Lung Injury  Outcome: No Change  RT PROGRESS NOTE     DATA:     CURRENT SETTINGS: Vent in room: Settings 14/450/+5             TRACH TYPE / SIZE: #7 Bivona (Cuff up, placed 07/05)             MODE:  HFTM             FIO2:   30%, 30 L     ACTION:             THERAPIES: Duo-neb Q6/Saline BID             SUCTION: Yes                         FREQUENCY: 5x                        AMOUNT:   Moderate x3 to Large x2                        CONSISTENCY: Thick (Lavaged x2)                        COLOR:   Pale yellow             SPONTANEOUS COUGH EFFORT/STRENGTH OF EFFORT (not elicited by suctioning): Strong productive cough.                              WEANING PHASE:   2                        WEAN MODE:  HFTM                         WEAN TIME:   Continuously since yesterday                        END WEAN REASON:        RESPONSE:             BS:   Coarse             VITAL SIGNS: Sat 94-98%, HR 80-97, RR 20-24             EMOTIONAL NEEDS / CONCERNS:  None                 RISK FOR SELF DECANNULATION:  Yes                        RISK DUE TO:  Confusion                        INTERVENTION/S IN PLACE IS/ARE: Mitts x2       NOTE / PLAN:      Pt remains on TM and tolerating well with no distress.  Continue current care plan.

## 2021-07-20 NOTE — PLAN OF CARE
Problem: Anxiety  Goal: Anxiety Reduction or Resolution  Outcome: Improving   Pt vitals stable. No signs of anxiety noted. Pt  was calm all shift. Repositioned every 2 hours and tolerated it well. Will continue plan  of cares.

## 2021-07-20 NOTE — PROGRESS NOTES
Had a long conversation with patient's wife Susy on the telephone.  I made it quite abundantly clear to her that the patient is unlikely going to come home to live ever.  We tried to address the issue of DNR/DNI versus full code.  Susy at this time wants to think this over and talk it over to Yolanda who is their only child.  She probably may have some more questions on this Friday when there is a care conference.    We also talked about the fact that Dayron is unlikely going to have significant meaningful independent active life given the insult to his brain.  Susy did breakdown on the phone and she did tell me that she understands but she is just refusing to accept it.  I encouraged her and told her that is understandable.  I also told her that life did not prepare her for this.  I also told her that she needs to think of what Dayron would want and not what sounds like false hope for her

## 2021-07-20 NOTE — PROGRESS NOTES
PALLIATIVE CARE PROGRESS NOTE     Patient Name: Dayron Neri  Date of Admission: 6/11/2021   Today the patient was seen for: followup     Impressions & Recommendations:  Goals of care per [facility]  Wife wants him to  Regain his speech, have less secretions, have trach out eventually    Code Status: Full Code, confirmed again on 7/8  Palliative Care will attend the Care progression conference  with patient's daughter Yolanda and patient's wife Susy .  They have agreed to 2 PM on this Friday for a family care conference.      Symptom management  # Encephalopathy:   Comment: in setting of SAH s/p craniotomy and clipping P-comm ruptured aneurysm, and b/l infarct.  EEG neg for seizures.  Repeat CT 7/1 with no acute changes.  Slow progression, alertness wax/wanes  Recommendations:   - Methylphenidate started 7/7  : This has greatly helped his alertness  - consider switching nebs to albuterol only to avoid anticholinergics in setting of encephalopathy   - Avoid benzodiazepines, antihistamines, anticholinergics if able  - Lights on and blinds open during the day.  Reorient frequently  - Lights & TV off during the night.  Promote normal circadian rhythm  - Limit sensory deprivation - utilize hearing aids, glasses, etc  - Frequently assess unmet bathroom needs if applicable.          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  Comment:  patient was diuresed yesterday   in an attempt to decrease the secretions and it seems to help  Speaking valve is being attempted in short trials, the concern is that aspiration is noted via a blue dye test when  "cuff is down  Recommend  Approach from Right side    #Insomnia  Comment: worsened by  Ritalin at 5 pm  Recommendation  Agree with discontinue PM ritalin and now on Effexor    Psychosocial/spiritual support    En HARTMANN following    Wife, daughter are supportive    [facility]     Advanced Care Planning    Patient has a completed Health Care Directive: no     Health care agent: wife Susy      ----------------------------------------------------------------------------------------------------------------  Summary:  Dayron Neri is a    70 yoM with PMH HTN admitted [facility]  for AMS and a fall. Found to have acute subarachnoid hemorrhage and underwent left external ventricular drain placement.  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  Course complicated by CT head showing bilateral BAYLEE infarct.  Extubated 5/16, re-intubated 5/19, extubated again 6/3/2021.  Re-intubated again 6/6/2021 d/t hypoxia.  S/p trach/PEG on 6/7/2021, EVD removed 6/9/2021.  [facility].      Interim:   MD:he has not been able to be liberated from the ventilator.He has had repeated bouts of Klebsiella infection in sputum and ventilator associated pneumonia.    SLP:  Patient remains at high risk for aspiration with cuff deflated.  Writer explained this to patient's wife and daughter, who were present via iPad.  They have been advocating for him to be wearing speaking valve for longer periods throughout the day.  Writer advised brief speaking valve trials (15-20 minutes max) with direct supervision of Speech Therapy or RT only. and daughter verbalized understanding.  Wife continues to be very frustrated with what she deems as treatment team's inability to \"get his secretions under control\" and sees this as a major barrier to patient's progress.          ----------------------------------------------------------------------------------------------------------------  Palliative " "Overview:    Key Palliative Symptom data:  Pain: denies  Dyspnea: mild, copious secretions  Nausea:none  Anxiety:none    Prognosis, Goals, and/or Advance Care Planning were addressed today: Susy  Said she knows the outcome if he doesn't improve and she is not ready to go there    Mood, coping, and/or meaning in the context of serious illness were addressed today:yes*     Family discussion  7/20 I met with patient, who was sitting in his chair, and his wife and daughter who were on the iPad.  I asked him how things were going and patient's wife said I am still hoping for a miracle.  I indicated that I understood that there would be a meeting on Friday and she said yes, I do not know how I feel about it but yes I will be there.  I indicated that he is now on Scopolamine and Robinul to help with his secretions and she said \"why was any on that before\".  I explained that those medications can cause mucous plugs so pulmonary usually does not like us to do that when were trying to wean somebody.  However, since his secretions are still copious we will keep them on.  Again she said she did not like the idea that he was not on the speaking valve and I reiterated that with the cuff deflated he is aspirating more and so both either speech or respiratory therapy should be in the room now when he has a speaking valve on.  She side and said either they are too cautious or not cautious enough.  Lab came in to draw blood and the wife said that we will you checking a WBC, and I indicated that that is ordered for Monday Wednesday and Friday.  She could became upset so I went ahead and ordered it.  I placed music on for him and talked with him all the longer and indicated I will see them again on Friday      7/15 I met with patient, who was sitting in his chair, along with his wife and daughter who were on the iPad monitor.  My colleague En,  was also present.  I reintroduced myself as I had spoken to Susy, yesterday " and also upon first consultation from palliative care.  Susy and her daughter expressed quite a lot of anger with issues that have occurred at the LTAC.  We talked a little bit about this yesterday and she did talk to the patient [employer] Representative Gretel at my request.  She states her issues were little things that have added up.  1 thing was that he had mitts on for agitation and they were so tight and small that they were causing him sores on his arms.  She is also was agitated with the speech therapist as she did not feel like they were trying to have him on the speaking valve.  (Part of the reason for this is increased secretions coming from his trach).  She also feels that therapy is not being aggressive enough with him.  ( I chose to listen to her concerns and build a relationship, but I am concerned because he does not have the strength to participate in therapy right now.  Therapy is   passive at this point due to shaheen, her expectations are for therapy to be more aggressive.)  She is frustrated by multiple setbacks including Pseudomonas and aspiration pneumonia.  She indicates that she feels like he is on a holding pattern.  The best day she has seen him was on July 3 when he sat in the chair for 5.5 hours and could converse, was very alert and answering questions appropriately via the speaking valve.  She is upset that he aspirated resulting in  pneumonia.  Her  was attentive to this conversation and I recommended that we hold on this anymore for now because he seemed to be becoming discouraged.  She then would talk more reassuring to him after I mentioned that.    ROS  A full 14 point review of systems was otherwise completed and is negative aside from that mentioned above    -----------------------------------------------------------------------------------------------------------------  I have reviewed and supplemented the documentation in this patient's medical record listed below  "regarding active medical problems, allergies, and medications.     Current Problem List:   Active Problems:    Subarachnoid hemorrhage due to ruptured aneurysm (H)    Cognitive impairment    Acute respiratory failure with hypoxia and hypercapnia (H)    Other dysphagia    Left-sided neglect      Allergies:  No Known Allergies    PERTINENT PHYSICAL EXAMINATION:  Vital Signs: Blood pressure 109/68, pulse 99, temperature 99.3  F (37.4  C), temperature source Axillary, resp. rate 24, height 1.803 m (5' 10.98\"), weight 64.9 kg (143 lb), SpO2 96 %.   GENERAL: Sitting in chair, alert, following commands.  SKIN: Warm and dry   HEENT: Normocephalic, anicteric sclera, moist mucous membranes  LUNGS: Trach in place,secretions copious,blue dye noted    CARDIAC: RRR, normal s1/s2, w/o m/r/g   ABDOMINAL: BS (+), soft, non distended, non tender  : fournier in place  MUSKL: no gross joint deformities   EXTREMITIES: No edema or cyanosis, pulses 2+ and symmetrical  NEUROLOGIC: Alert and oriented though fatigued  PSYCH: Appears mildly depressed    All labs/imaging reviewed in Epic     ====================================================  TT: I have personally spent a total of 35 minutes on the unit in review of medical record, consultation with the medical providers and assessment of patient today, with more than 50% of this time spent in counseling, coordination of care, and discussion with prognosis, symptom management, risks and benefits of management options, and development of plan of care as noted above.  ====================================================      In addition to above 35 minutes,  SISI Romero, NP-C, [facility]    [facility]  Palliative Medicine  Office: [phone]  "

## 2021-07-20 NOTE — PLAN OF CARE
Problem: Adult Inpatient Plan of Care  Goal: Patient-Specific Goal (Individualized)  Outcome: Improving   Patient was calm but alert and very active all day. Patient had lots of secretions and needed lots of suctioning. Patient denied pain.

## 2021-07-20 NOTE — PLAN OF CARE
Problem: Mechanical Ventilation  Goal: Patient will maintain patent airway  Outcome: Progressing  Goal: Tracheostomy will be managed safely  Outcome: Progressing  Goal: Respiratory status - ventilation  Description: Movement of air in and out of the lungs.    Liberate from ventilator  Outcome: Progressing   RT PROGRESS NOTE   [id number]  DATA:     CURRENT SETTINGS:             TRACH TYPE / SIZE: #7 Bivona, downsized from #6 Lanieley DCT on 7/5             MODE: TM 30% 30L ( PRVC 14 450 +5 30%)             FIO2:        ACTION:             THERAPIES:   Duoneb q6,( MucomystBID DCed today, done in a.m.), Hypertonic SolBID             SUCTION:                           FREQUENCY:   X6 for mod to lg yellow to p-yellow thick                        AMOUNT:                           CONSISTENCY:                           COLOR:                SPONTANEOUS COUGH EFFORT/STRENGTH OF EFFORT (not elicited by suctioning): Yes                              WEANING PHASE: 2                        WEAN MODE:  HFTM  Cuff up 30% 30L  Since yesterday, 7/18/21  TM trials since 6/20                   WEAN TIME:                           END WEAN REASON:   last noc was the 1st noc off vent     RESPONSE:             BS: coarse to clr               VITAL SIGNS:                EMOTIONAL NEEDS / CONCERNS:                  RISK FOR SELF DECANNULATION:                          RISK DUE TO:                          INTERVENTION/S IN PLACE IS/ARE:         NOTE / PLAN:     Pt was off vent last noc for the 1st time,  reji well.  PMV trial done w Speech, reji well, was able to Sx himself with Payal, needed directions though.  Repeat BDT ordered.   Cont to monitor closely.

## 2021-07-20 NOTE — PLAN OF CARE
Problem: Adult Inpatient Plan of Care  Goal: Plan of Care Review  Outcome: No Change  Goal: Absence of Hospital-Acquired Illness or Injury  Outcome: No Change  Intervention: Identify and Manage Fall Risk  Recent Flow sheet Documentation  Taken 7/20/2021 0930 by Delvis Young RN  Safety Promotion/Fall Prevention:   bed alarm on   lighting adjusted   room door open  Intervention: Prevent Infection  Recent Flow sheet Documentation  Taken 7/20/2021 0930 by Delvis Young RN  Infection Prevention: hand hygiene promoted     Problem: Skin and Tissue Injury (Mechanical Ventilation, Invasive)  Goal: Absence of Device-Related Skin and Tissue Injury  Outcome: No Change     Problem: Anxiety  Goal: Anxiety Reduction or Resolution  Outcome: No Change     Problem: Restraint for Non-Violent/Non-Self-Destructive Behavior  Goal: Prevent/Manage Potential Problems  Description: Maintain safety of patient and others during period of restraint.  Promote psychological and physical wellbeing.  Prevent injury to skin and involved body parts.  Promote nutrition, hydration, and elimination.  Outcome: No Change   Pt was up in chair twice and attended therapies. He tolerated activities through out the day. No S/S of pain observed. He is on restraint and monitored for safety.

## 2021-07-20 NOTE — PROGRESS NOTES
[facility]    Medicine Progress Note - Hospitalist Service       Date of Admission:  6/11/2021    Identifier:  70-year-old retired man who lived with his wife at home prehospitalization, who originally presented to [facility] on May 15, 2021 for altered mental state he had sustained a fall and was found to be unresponsive by his wife.  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  He was treated with milrinone and verapamil.  He was successfully extubated on Pamela 3 and on June 6 he was reintubated due to drop in his saturations and underwent tracheostomy placement as well as feeding tube placement on June 7.  June 9: EVD was removed.  He was transferred to LTAC on June 11  In the last 1 month he has not been able to be liberated from the ventilator.  He has had repeated bouts of Klebsiella infection in sputum and ventilator associated pneumonia.  Additionally he has had a large amount of tracheal secretions, significant cognitive impairment, left-sided neglect and ongoing episodes of agitation.  Patient's family has been monitoring the patient continuously during the daytime hours via video monitor and visit with him in person over the weekends.  Multiple specialties have been involved in the care of this patient including palliative care, infectious disease pulmonary medicine and neurology amongst others.    Previously patient was very restless at night and yesterday we adjusted medications as well as diuresed patient in an attempt to decrease the secretions and it seems to help.  Secretions continue to be a challenge and there seems to be a little bit of  a back-and-forth.  Pulmonary medicine has recommended discontinuation of Robinul but in that case there is a lot of watery secretions.  Patient spends most of his day sitting up in the chair and as mentioned his family is continuously monitoring him              Assessment & Plan           1/.  Acute hypoxemic respiratory failure due to prolonged hospitalization, also because of significant intracranial hemorrhage.  One of the biggest barriers to decannulation is related to the issue of excessive secretions and these need to be addressed appropriately.  Added glycopyrrolate 1 mg 3 times a day and uptitrate as appropriate.  Definitely seems to have helped we will not increase the dosage at this time  Also diuresed him with Lasix for 3 doses with significant improvement however that improvement seems to have stabilized or plateaued  Speaking valve is being attempted in short trials, the concern is that of aspiration hence a blue dye test will be ordered  2/.  Insomnia: Definitely making matters worse for the patient as I review his medications he is on Ritalin twice a day, getting Ritalin at bedtime is probably not the best idea for treatment of insomnia.  We will discontinue Ritalin at that time we will keep him at only once a day in the morning.  Started with nighttime mirtazapine 7.5 mg scheduled.  Which did seem to help  Can be uptitrated as appropriate.  However at this time I do not see the need to uptitrate it  3/.  Traumatic brain injury/intracranial hemorrhage: Effexor was started about a week ago at 25 mg 3 times a day we can uptitrated today to 37.53 times a day, family informed  4/.  Dysphagia: Secondary to respiratory failure continue with tube feeds at this time.  Patient failed the blue dye test with immediate aspiration, continue to use tube feeds  5/.  Ventilator associated pneumonia appreciate intervention and recommendations by infectious disease antibiotics to continue till July 18.  6/.  Prognosis  and logistics: This is challenge because patient's wife is definitely having a difficult time with the situation and recognizing that the patient is unlikely going to be his prehospitalization self.  She probably does know it but is unwilling to accept the reality which is unfortunate.  7/.  Care progression conference would be mandatory and very beneficial I would recommend having it in person versus via video.  I spoke with patient's daughter Yolanda and patient's wife Susy on the video in the presence of the patient.  They have agreed to 2 PM on this Friday for a family care conference.  Communicated to   I would encourage patient's daughter who is also involved in the care to be present for the same.  8/.  Restraints: We have been using mittens and that seems to be eliminating the need for a sitter.       Diet: Adult Formula Drip Feeding: Continuous Osmolite 1.5; Gastrostomy; Goal Rate: 90; mL/hr; Medication - Feeding Tube Flush Frequency: 240 mL q 4 hours water flushes; Amount to Send (Nutrition use): send 3 packets Nutrisource Fiber daily; Tube Feeding...    DVT Prophylaxis: Heparin SQ  Gutierrez Catheter: Not present  Central Lines: None  Code Status: Full Code      Disposition Plan   Expected discharge: Unclear at this time recommended to transitional care unit once Liberation from the ventilator.     The patient's care was discussed with the Bedside Nurse, Care Coordinator/ and Patient's Family.    Latonya Lemus MD, [facility]  Hospitalist Service  [facility]  Securely message with the Vocera Web Console (learn more here)  Text page via [employer] Paging/Directory      ______________________________________________________________________      Data reviewed today: I reviewed all medications, new labs and imaging results over the last 24 hours. I personally reviewed no images or EKG's today.    Physical Exam   Vital Signs: Temp: 99.3  F (37.4  C) Temp src: Axillary BP:  109/68 Pulse: 85   Resp: 20 SpO2: 97 % O2 Device: Trach dome Oxygen Delivery: 30 LPM  Weight: 143 lbs 0 oz  General Appearance: Awake sitting in chair, left-sided neglect but responds to voice, said good morning to me when I addressed him  Respiratory: Coarse breath sounds tracheostomy in place  Cardiovascular: S1-S2 regular rate and rhythm  GI: Soft nondistended bowel sounds are present feeding tube noted  Skin: No rashes or lesions.    Other: No lower extremity swelling  Patient is moving both upper extremities without any difficulty and constantly fidgeting  Neurological exam cannot be undertaken because of patient having significant cognitive impairment    Data   Recent Labs   Lab 07/20/21  0650 07/19/21  1722 07/19/21  0615 07/16/21  0543 07/14/21  0626   WBC 13.7*  --  9.7 9.1  --    HGB 10.3*  --  10.0* 10.3*  --    MCV 96  --  97 95  --    *  --  413 346  --    NA  --   --  141 140 144   POTASSIUM  --   --  4.5 4.0 4.1   CHLORIDE  --   --  102 100 105   CO2  --   --  28 30 29   BUN  --   --  18 24* 24*   CR  --   --  0.53* 0.58* 0.57*   ANIONGAP  --   --  11 10 10   RAGINI  --   --  10.3 10.1 10.5   GLC  --  130* 128* 133* 124   7/.

## 2021-07-20 NOTE — PROGRESS NOTES
[facility]:  LTACH PULMONARY PROGRESS NOTE     Date / Time of Admission:  6/11/2021  4:36 PM    ID: Dayron Neri is a 70 year old with essential hypertension who was admitted to an outside facility on 5/15/2021 with subarachnoid hemorrhage with complications of post procedure respiratory failure, status post tracheostomy and PEG tube placement on 6/7/2021; Klebsiella pneumonia, Pseudomonas pneumonia with ESBL.      Reason for Consult:  Respiratory failure         Assessment: 1.   Subarachnoid bleed status post craniotomy and clipping P-comm ruptured aneurysm.    2. Chronic encephalopathy secondary to above.    3. Acute respiratory failure with hypoxia/hypercapnia status post tracheostomy 6/7/2021. Transition to Bivona 7 on 7/5/2021.  4. Recent pneumonia with Pseudomonas ESBL and 6/19/21 and Klebsiella 7/9/21.  Of note, was treated with tobramycin nebulizer due to concern for colonization and underlying Pseudomonas ESBL.  Completed treatment with tobramycin nebs 7/9.  Antibiotics completed per ID.   5. Essential hypertension.    6. Malnourishment with dysphagia status post G-tube placement 6/7/2021.    7. High risk   8. Leukocytosis, unspecified.  Unclear if microaspirations given blue dye findings 7/20.  Repeat WBC improved.         Plan:     Clinical status discussed today with respiratory therapist    Tracheal secretions:  moderate - to - large, thick, blue streaks (had blue dye test today)    Last VBG 7/20:  PH 7.43, pCO2 53     Continue phase 2 weaning as tolerates     Phase 2:  Trach mask wean as tolerated. O2 to keep sats greater than or equal to 92%.     Keeping on TM today; holding off on vent at night unless needed or respiratory distress.     On continuous TM since 7/18.    Speaking Valve (SV) trials with supervision due to blue dye findings 7/20:  brief speaking valve trials (15-20 minutes max) with direct supervision of Speech Therapy or RT only    Routine trach cares    Keep cuff  Assessment/Plan:         Diagnoses and all orders for this visit:    Thyroid nodule; repeat US Thyroid in 6-12 Mos  RTC in 3 mos w :  -     T4, free; Future  -     TSH, 3rd generation; Future  -     Thyroid Antibodies Panel; Future    Neck pain; Moist Heat  Home P T  Low vitamin D level; start :  -     ergocalciferol (VITAMIN D2) 50,000 units; Take one cap daily for 2 weeks then weekly for 10 weeks  RTC in 3mos  w;  -     Vitamin D 25 hydroxy; Future    Low vitamin B12 level; start :  -     vitamin B-12 (VITAMIN B-12) 1,000 mcg tablet; Take 1 tablet (1,000 mcg total) by mouth daily  RTC in 3mos w :  -     Vitamin B12; Future    Rash; on left forearm ? ? Try :  -     levofloxacin (LEVAQUIN) 500 mg tablet; Take 1 tablet (500 mg total) by mouth every 24 hours for 10 days With food/Lunch  Triamcinolone 0 5% Cream TID and :  -     mupirocin (BACTROBAN) 2 % ointment; Apply topically 3 (three) times a day Apply to affected area  Consider Dermatology Consult    Acute cystitis without hematuria  -     levofloxacin (LEVAQUIN) 500 mg tablet; Take 1 tablet (500 mg total) by mouth every 24 hours for 10 days With food/Lunch  -     UA (URINE) with reflex to Scope; Future    Hypothyroidism due to Hashimoto's thyroiditis; Mild :  RTC in 3mos w ;  -     T4, free; Future  -     TSH, 3rd generation; Future  -     Thyroid Antibodies Panel; Future    Other orders  -     Cancel: Ambulatory referral to Dermatology; Future        Subjective:      Patient ID: Tristian Quinones is a 37 y o  female  [address] is here for Regular Check up, she still feels fatigue, weight gain, recent Blood work and med List reviewed w pt in detail    The following portions of the patient's history were reviewed and updated as appropriate: allergies, current medications, past family history, past social history, past surgical history and problem list     Review of Systems   Constitutional: Positive for fatigue  Negative for chills and fever     HENT: Negative for congestion, facial swelling, sore throat, trouble swallowing and voice change  Eyes: Negative for pain, discharge and visual disturbance  Respiratory: Negative for cough, shortness of breath and wheezing  Cardiovascular: Negative for chest pain, palpitations and leg swelling  Gastrointestinal: Negative for abdominal pain, blood in stool, constipation, diarrhea and nausea  Endocrine: Negative for polydipsia, polyphagia and polyuria  Genitourinary: Negative for difficulty urinating, hematuria and urgency  Musculoskeletal: Positive for arthralgias and neck pain  Negative for myalgias  Skin: Negative for rash  Neurological: Negative for dizziness, tremors, weakness and headaches  Hematological: Negative for adenopathy  Does not bruise/bleed easily  Psychiatric/Behavioral: Negative for dysphoric mood, sleep disturbance and suicidal ideas  Objective:      /80 (BP Location: Left arm, Patient Position: Sitting, Cuff Size: Standard)   Pulse 80   Temp 98 2 °F (36 8 °C) (Probe)   Resp 14   Ht 5' 2" (1 575 m)   Wt 88 5 kg (195 lb)   LMP 08/29/2020 (Exact Date)   SpO2 98%   BMI 35 67 kg/m²          Physical Exam  Constitutional:       General: She is not in acute distress  HENT:      Head: Normocephalic  Mouth/Throat:      Pharynx: No oropharyngeal exudate  Eyes:      General: No scleral icterus  Conjunctiva/sclera: Conjunctivae normal       Pupils: Pupils are equal, round, and reactive to light  Neck:      Musculoskeletal: Neck supple  Thyroid: No thyromegaly  Cardiovascular:      Rate and Rhythm: Normal rate and regular rhythm  Heart sounds: Normal heart sounds  No murmur  Pulmonary:      Effort: Pulmonary effort is normal  No respiratory distress  Breath sounds: Normal breath sounds  No wheezing or rales  Abdominal:      General: Bowel sounds are normal  There is no distension  Palpations: Abdomen is soft  Tenderness:  There inflated due to high risk for aspiration with cuff deflated (per blue dye/Speech eval 7/20).      Current: #7 Bivona (Cuff up, placed 07/05)    Change trach every 30 days - next change 8/5    Diagnostic testing: Last chest x-ray 7/15    Pulmonary toilet: Continue scheduled DuoNebs every 6 hours, hypertonic saline nebs 2x/day    On 7/19: D/c'ed Robinul q8H and also mucomyst nebs Q12H as I feel they are competing with each other.     On 7/20: Scopolamine patch per primary team.    Will monitor secretion management with scopolamine patch    Thank you for allowing our service to participate in the care of this patient.  Please call with any questions or concerns.    Total patient care time: 35 minutes, with over 50% spent in counseling/coordination of care.      Rehana Kauffman MD, MPH  Pulmonary/Critical Care Medicine  07/20/2021  6:52 PM        Subjective/Interval History:   Last night was 2nd night off vent overnight per RT.   Blue dye test - findings of aspiration risk with cuff deflated.    Started on scopolamine patch today  Remains on trach mask.    Review of Systems:  Pertinent items are noted in HPI.        Allergies/Medications:   Allergies:   No Known Allergies    Continuous Infusions:    sodium chloride       Scheduled Medications:    famotidine  20 mg Oral or Feeding Tube BID     fiber modular (NUTRISOURCE FIBER)  1 packet Per Feeding Tube TID     heparin ANTICOAGULANT  5,000 Units Subcutaneous Q8H     ipratropium - albuterol 0.5 mg/2.5 mg/3 mL  3 mL Nebulization Q6H     lisinopril  40 mg Oral or Feeding Tube Daily     menthol-zinc oxide   Topical TID     methylphenidate  10 mg Oral or Feeding Tube QAM AC     mirtazapine  7.5 mg Per Feeding Tube At Bedtime     nystatin  500,000 Units Mouth/Throat 4x Daily     scopolamine  1 patch Transdermal Q72H    And     scopolamine   Transdermal Q8H     sodium chloride (PF)  3 mL Intracatheter Q8H     venlafaxine  37.5 mg Per Feeding Tube TID w/meals          Objective:  "  Vitals:  /84 (BP Location: Left arm)   Pulse 86   Temp 98.4  F (36.9  C) (Axillary)   Resp 20   Ht 1.803 m (5' 10.98\")   Wt 64.9 kg (143 lb)   SpO2 95%   BMI 19.95 kg/m    Vent:  Ventilation Mode: Trach collar  FiO2 (%): 30 %  Oxygen Concentration (%): 30 %  Resp: 20    GEN: Sleeping.  Wearing hand mitts.   HEENT: Temporal wasting bilaterally.  Normocephalic, atraumatic.  Extraoccular eye movements intact, anicteric sclera. No sinus tenderness to palpation.  Moist mucous membranes.   NECK: Supple.  #7 Bivona, on trach mask.  PULM: Non-labored breathing.  No use of accessory muscles.  Clear to auscultation.  CVS: Regular rate and rhythm.  Normal S1, S2.  No rubs, murmurs, or gallops.    ABDOMEN: Normoactive bowel sounds.   Non-distended.    EXTREMITES:  No clubbing, cyanosis, or edema.    NEURO:  Sleeping today, did not awaken during exam.    Intake/Output:  I/O last 3 completed shifts:  In: 3373 [NG/GT:3373]  Out: 1800 [Urine:1800]        Pertinent Studies:   All laboratory data reviewed  Serum Glucose range:   Recent Labs   Lab 07/19/21  1722 07/19/21  0615 07/16/21  0543 07/14/21  0626   * 128* 133* 124     ABG: No lab results found in last 7 days.  CBC:   Recent Labs   Lab 07/20/21  1449 07/20/21  0650 07/19/21  0615 07/16/21  0543   WBC 12.0* 13.7* 9.7 9.1   HGB  --  10.3* 10.0* 10.3*   HCT  --  31.4* 31.2* 31.3*   MCV  --  96 97 95   PLT  --  462* 413 346   NEUTROPHIL  --  81 75 72   LYMPH  --  10 14 14   MONOCYTE  --  6 7 8   EOSINOPHIL  --  2 3 4     Chemistry:   Recent Labs   Lab 07/19/21  0615 07/16/21  0543 07/14/21  0626    140 144   POTASSIUM 4.5 4.0 4.1   CHLORIDE 102 100 105   CO2 28 30 29   BUN 18 24* 24*   CR 0.53* 0.58* 0.57*   GFRESTIMATED >90 >90 >90   RAGINI 10.3 10.1 10.5     Coags:  No results for input(s): INR, PROTIME, PTT in the last 168 hours.    Invalid input(s): APTT  Cardiac Markers:  No results for input(s): CKTOTAL, TROPONINI in the last 168 hours. " is no abdominal tenderness  There is no guarding or rebound  Musculoskeletal:         General: Tenderness present  Lymphadenopathy:      Cervical: No cervical adenopathy  Skin:     Coloration: Skin is not pale  Findings: No rash  Neurological:      Mental Status: She is alert and oriented to person, place, and time  Sensory: No sensory deficit  Motor: No weakness      Microbiology:    Sputum culture, 7/9:  2+ Klebsiella oxytoca    ET aspirate, 6/19: Pseudomonas aeruginosa    Blood cultures x 2, 7/9:  No growth        Cardiology/Radiology:   Cardiac: All cardiac studies reviewed by me.    TTE, 5/27:  Global and regional left ventricular function is normal with an EF of 60-65%.  Global right ventricular function is normal. The peak velocity of the tricuspid regurgitant jet is not obtainable. Pulmonary artery systolic pressure cannot be assessed. IVC diameter and respiratory changes fall into an intermediate range suggesting an RA pressure of 8 mmHg.    Radiology: All imaging studies reviewed by me.    Chest X-Ray, 7/15:  No change in tracheostomy tube. Heart and mediastinal size are stable. There is indistinctness of the pulmonary interstitium, representing either airway inflammation or edema. There is bandlike opacity involving the retrocardiac region of the left lung base, increased from prior study and representing either atelectasis or infectious process. No pleural effusion or pneumothorax. There is some mild elevation of the right hemidiaphragm.

## 2021-07-20 NOTE — PROGRESS NOTES
"CLINICAL NUTRITION SERVICES - REASSESSMENT NOTE     Nutrition Prescription    RECOMMENDATIONS FOR MDs/PROVIDERS TO ORDER:  None    Malnutrition Status:    N/A-not assessed today. Pt did not meet two criteria per RD note on 7/15.    Recommendations already ordered by Registered Dietitian (RD):  TF: Osmolite 1.5 at 90 mL/hour with FWF of 240 mL q 4 hours, three packets Benefiber daily.  Intervention: RD added \"Free Water\" order set to make FWF orders less confusing for nursing, and deleted previous order for FWF of 100 mL q 8 hours.    Future/Additional Recommendations:  None       "

## 2021-07-20 NOTE — PROGRESS NOTES
"Speech Therapy Progress Note    Repeat Blue Dye Test completed due to ongoing concerns of aspiration of oropharyngeal secretions.  Patient presented with immediate aspiration upon cuff deflation.  Writer suctioned patient 3x for copious bright blue secretions.  Approximately 10 minutes later, patient required additional suctioning.  Writer summoned RT, who suctioned him 2x for copious amounts of bright blue.      Patient remains at high risk for aspiration with cuff deflated.  Writer explained this to patient's wife and daughter, who were present via iPad.  They have been advocating for him to be wearing speaking valve for longer periods throughout the day.  Writer advised brief speaking valve trials (15-20 minutes max) with direct supervision of Speech Therapy or RT only. Wife and daughter verbalized understanding.  Wife continues to be very frustrated with what she deems as treatment team's inability to \"get his secretions under control\" and sees this as a major barrier to patient's progress.    Sheridan Clark MA, CCC-SLP  "

## 2021-07-21 ENCOUNTER — APPOINTMENT (OUTPATIENT)
Dept: SPEECH THERAPY | Facility: CLINIC | Age: 71
DRG: 207 | End: 2021-07-21
Attending: HOSPITALIST
Payer: COMMERCIAL

## 2021-07-21 ENCOUNTER — APPOINTMENT (OUTPATIENT)
Dept: PHYSICAL THERAPY | Facility: CLINIC | Age: 71
DRG: 207 | End: 2021-07-21
Attending: HOSPITALIST
Payer: COMMERCIAL

## 2021-07-21 ENCOUNTER — APPOINTMENT (OUTPATIENT)
Dept: OCCUPATIONAL THERAPY | Facility: CLINIC | Age: 71
DRG: 207 | End: 2021-07-21
Attending: HOSPITALIST
Payer: COMMERCIAL

## 2021-07-21 LAB
ANION GAP SERPL CALCULATED.3IONS-SCNC: 9 MMOL/L (ref 5–18)
BUN SERPL-MCNC: 23 MG/DL (ref 8–22)
CALCIUM SERPL-MCNC: 10.6 MG/DL (ref 8.5–10.5)
CALCIUM, IONIZED MEASURED: 1.4 MMOL/L (ref 1.11–1.3)
CHLORIDE BLD-SCNC: 100 MMOL/L (ref 98–107)
CO2 SERPL-SCNC: 31 MMOL/L (ref 22–31)
CREAT SERPL-MCNC: 0.61 MG/DL (ref 0.7–1.3)
GFR SERPL CREATININE-BSD FRML MDRD: >90 ML/MIN/1.73M2
GLUCOSE BLD-MCNC: 119 MG/DL (ref 70–125)
ION CA PH 7.4: 1.41 MMOL/L (ref 1.11–1.3)
PH: 7.42 (ref 7.35–7.45)
POTASSIUM BLD-SCNC: 4.7 MMOL/L (ref 3.5–5)
SODIUM SERPL-SCNC: 140 MMOL/L (ref 136–145)

## 2021-07-21 PROCEDURE — 250N000011 HC RX IP 250 OP 636

## 2021-07-21 PROCEDURE — 97530 THERAPEUTIC ACTIVITIES: CPT | Mod: GP | Performed by: PHYSICAL THERAPIST

## 2021-07-21 PROCEDURE — 94640 AIRWAY INHALATION TREATMENT: CPT | Mod: 76

## 2021-07-21 PROCEDURE — 999N000123 HC STATISTIC OXYGEN O2DAILY TECH TIME

## 2021-07-21 PROCEDURE — 36415 COLL VENOUS BLD VENIPUNCTURE: CPT | Performed by: HOSPITALIST

## 2021-07-21 PROCEDURE — 999N000009 HC STATISTIC AIRWAY CARE

## 2021-07-21 PROCEDURE — 94640 AIRWAY INHALATION TREATMENT: CPT

## 2021-07-21 PROCEDURE — 82330 ASSAY OF CALCIUM: CPT | Performed by: INTERNAL MEDICINE

## 2021-07-21 PROCEDURE — 80048 BASIC METABOLIC PNL TOTAL CA: CPT | Performed by: HOSPITALIST

## 2021-07-21 PROCEDURE — 250N000013 HC RX MED GY IP 250 OP 250 PS 637

## 2021-07-21 PROCEDURE — 36415 COLL VENOUS BLD VENIPUNCTURE: CPT | Performed by: INTERNAL MEDICINE

## 2021-07-21 PROCEDURE — 99233 SBSQ HOSP IP/OBS HIGH 50: CPT | Performed by: INTERNAL MEDICINE

## 2021-07-21 PROCEDURE — 250N000009 HC RX 250: Performed by: INTERNAL MEDICINE

## 2021-07-21 PROCEDURE — 250N000013 HC RX MED GY IP 250 OP 250 PS 637: Performed by: INTERNAL MEDICINE

## 2021-07-21 PROCEDURE — 120N000017 HC R&B RESPIRATORY CARE

## 2021-07-21 PROCEDURE — 999N000157 HC STATISTIC RCP TIME EA 10 MIN

## 2021-07-21 PROCEDURE — 97112 NEUROMUSCULAR REEDUCATION: CPT | Mod: GP | Performed by: PHYSICAL THERAPIST

## 2021-07-21 PROCEDURE — 99207 PR CDG-MDM COMPONENT: MEETS MODERATE - DOWN CODED: CPT | Performed by: INTERNAL MEDICINE

## 2021-07-21 PROCEDURE — 99232 SBSQ HOSP IP/OBS MODERATE 35: CPT | Performed by: INTERNAL MEDICINE

## 2021-07-21 PROCEDURE — 92507 TX SP LANG VOICE COMM INDIV: CPT | Mod: GN

## 2021-07-21 RX ADMIN — FAMOTIDINE 20 MG: 20 TABLET, FILM COATED ORAL at 21:37

## 2021-07-21 RX ADMIN — VENLAFAXINE 37.5 MG: 37.5 TABLET ORAL at 16:29

## 2021-07-21 RX ADMIN — HEPARIN SODIUM 5000 UNITS: 5000 INJECTION, SOLUTION INTRAVENOUS; SUBCUTANEOUS at 21:37

## 2021-07-21 RX ADMIN — ANORECTAL OINTMENT: 15.7; .44; 24; 20.6 OINTMENT TOPICAL at 21:37

## 2021-07-21 RX ADMIN — NYSTATIN 500000 UNITS: 100000 SUSPENSION ORAL at 21:37

## 2021-07-21 RX ADMIN — NYSTATIN 500000 UNITS: 100000 SUSPENSION ORAL at 16:29

## 2021-07-21 RX ADMIN — METHYLPHENIDATE HYDROCHLORIDE 10 MG: 10 TABLET ORAL at 07:53

## 2021-07-21 RX ADMIN — ANORECTAL OINTMENT: 15.7; .44; 24; 20.6 OINTMENT TOPICAL at 13:43

## 2021-07-21 RX ADMIN — IPRATROPIUM BROMIDE AND ALBUTEROL SULFATE 3 ML: .5; 3 SOLUTION RESPIRATORY (INHALATION) at 19:34

## 2021-07-21 RX ADMIN — HEPARIN SODIUM 5000 UNITS: 5000 INJECTION, SOLUTION INTRAVENOUS; SUBCUTANEOUS at 05:38

## 2021-07-21 RX ADMIN — Medication 1 PACKET: at 21:38

## 2021-07-21 RX ADMIN — VENLAFAXINE 37.5 MG: 37.5 TABLET ORAL at 09:08

## 2021-07-21 RX ADMIN — Medication 1 PACKET: at 13:42

## 2021-07-21 RX ADMIN — IPRATROPIUM BROMIDE AND ALBUTEROL SULFATE 3 ML: .5; 3 SOLUTION RESPIRATORY (INHALATION) at 07:50

## 2021-07-21 RX ADMIN — MIRTAZAPINE 7.5 MG: 7.5 TABLET ORAL at 21:37

## 2021-07-21 RX ADMIN — ANORECTAL OINTMENT: 15.7; .44; 24; 20.6 OINTMENT TOPICAL at 09:10

## 2021-07-21 RX ADMIN — IPRATROPIUM BROMIDE AND ALBUTEROL SULFATE 3 ML: .5; 3 SOLUTION RESPIRATORY (INHALATION) at 01:51

## 2021-07-21 RX ADMIN — Medication 1 PACKET: at 09:21

## 2021-07-21 RX ADMIN — IPRATROPIUM BROMIDE AND ALBUTEROL SULFATE 3 ML: .5; 3 SOLUTION RESPIRATORY (INHALATION) at 15:11

## 2021-07-21 RX ADMIN — VENLAFAXINE 37.5 MG: 37.5 TABLET ORAL at 13:41

## 2021-07-21 RX ADMIN — NYSTATIN 500000 UNITS: 100000 SUSPENSION ORAL at 13:42

## 2021-07-21 RX ADMIN — FAMOTIDINE 20 MG: 20 TABLET, FILM COATED ORAL at 09:08

## 2021-07-21 RX ADMIN — NYSTATIN 500000 UNITS: 100000 SUSPENSION ORAL at 09:08

## 2021-07-21 RX ADMIN — LISINOPRIL 40 MG: 20 TABLET ORAL at 19:56

## 2021-07-21 RX ADMIN — HEPARIN SODIUM 5000 UNITS: 5000 INJECTION, SOLUTION INTRAVENOUS; SUBCUTANEOUS at 13:42

## 2021-07-21 ASSESSMENT — MIFFLIN-ST. JEOR: SCORE: 1436.88

## 2021-07-21 NOTE — PLAN OF CARE
Problem: Communication Impairment (Mechanical Ventilation, Invasive)  Goal: Effective Communication  Outcome: No Change     Problem: Device-Related Complication Risk (Mechanical Ventilation, Invasive)  Goal: Optimal Device Function  Outcome: No Change     Problem: Skin and Tissue Injury (Mechanical Ventilation, Invasive)  Goal: Absence of Device-Related Skin and Tissue Injury  Outcome: No Change     Problem: Ventilator-Induced Lung Injury (Mechanical Ventilation, Invasive)  Goal: Absence of Ventilator-Induced Lung Injury  Outcome: No Change       RT PROGRESS NOTE     DATA:     CURRENT SETTINGS:             TRACH TYPE / SIZE: #7 Bivona placed on 07/05/21             MODE:   HFTM             FIO2:   30% and 30 lpm     ACTION:             THERAPIES:   DUO NEB Q6             SUCTION:                           FREQUENCY:  X3                        AMOUNT:   Moderate to large                        CONSISTENCY:   Thick                        COLOR:   White/yellow             SPONTANEOUS COUGH EFFORT/STRENGTH OF EFFORT (not elicited by suctioning): Yes:Strong                              WEANING PHASE:   #2                        WEAN MODE:    HFTM as reji                        WEAN TIME:                           END WEAN REASON:  Cont     RESPONSE:             BS:  Coarse             VITAL SIGNS:  SAT 95-96%, HR 81-95, RR 20-22             EMOTIONAL NEEDS / CONCERNS: Confuse             RISK FOR SELF DECANNULATION: Yes                        RISK DUE TO:  Restless                        INTERVENTION/S IN PLACE IS/ARE:  Bilateral mitts     NOTE / PLAN:   Pt is 3rd night off the vent and is  on hftm 30% and 30 lpm, reji well. BDT fail. Cont current therapy and keep sat >/=90%

## 2021-07-21 NOTE — PLAN OF CARE
Problem: Mechanical Ventilation  Goal: Patient will maintain patent airway  Outcome: Progressing  Goal: Tracheostomy will be managed safely  Outcome: Progressing  Goal: Respiratory status - ventilation  Description: Movement of air in and out of the lungs.    Liberate from ventilator  Outcome: Progressing   RT PROGRESS NOTE   [id number]  DATA:     CURRENT SETTINGS:             TRACH TYPE / SIZE: #7 Bivona, downsized from #6 Shiley DCT on 7/5             MODE: TM 25% from 30% 30L              FIO2:        ACTION:             THERAPIES:   Duoneb q6             SUCTION:                           FREQUENCY:   X6-8 for mod to lg blue to p-yellow thickish                        AMOUNT:                           CONSISTENCY:                           COLOR:                SPONTANEOUS COUGH EFFORT/STRENGTH OF EFFORT (not elicited by suctioning): Yes                              WEANING PHASE: 2                        WEAN MODE:  HFTM  Cuff up cont since 7/18/21  TM trials since 6/20                   WEAN TIME:                           END WEAN REASON:       RESPONSE:             BS: coarse to clr               VITAL SIGNS:                EMOTIONAL NEEDS / CONCERNS:                  RISK FOR SELF DECANNULATION:                          RISK DUE TO:                          INTERVENTION/S IN PLACE IS/ARE:         NOTE / PLAN:     Pt cont on HFTM.  PMV trial done w Speech for 20min.                     New Order                    Speaking Valve (SV) trials with supervision due to blue dye findings 7/20:  brief speaking valve trials (15-20 minutes max) with direct supervision of Speech Therapy or RT only  Repeat BDT ordered.   Cont to monitor closely.

## 2021-07-21 NOTE — PROGRESS NOTES
Social Work Note:  Patient chart reviewed.  Patient discussed in morning rounds.  Barriers to discharge: Trach. Vent. 30lpm/30%.  Phase 2.  Frequent suction.  Duo neds Q6  IV abx.  Mitts x2.    Patient has care progression meeting Friday 07/23/21 @ 14:00.  Palliative care team actively involved providing care and support to patient, spouse, and daughter.   Patient's exact discharge date, time, disposition TBD.  SW will continue to follow for psychosocial and emotional support of patient and family. SW to facilitate discharge to SNF/TCU when pt no longer requires LTACH level of care.        Dayron Chen, [facility] HospitalSW  [facility] SW  [id number]

## 2021-07-21 NOTE — PROGRESS NOTES
[facility]:  LTACH PULMONARY PROGRESS NOTE     Date / Time of Admission:  6/11/2021  4:36 PM    ID: Dayron Neri is a 70 year old with essential hypertension who was admitted to an outside facility on 5/15/2021 with subarachnoid hemorrhage with complications of post procedure respiratory failure, status post tracheostomy and PEG tube placement on 6/7/2021; Klebsiella pneumonia, Pseudomonas pneumonia with ESBL.      Reason for Consult:  Respiratory failure         Assessment: 1.   Subarachnoid bleed status post craniotomy and clipping P-comm ruptured aneurysm.    2. Chronic encephalopathy secondary to above.    3. Acute respiratory failure with hypoxia/hypercapnia status post tracheostomy 6/7/2021. Transition to Bivona 7 on 7/5/2021.  4. Recent pneumonia with Pseudomonas ESBL and 6/19/21 and Klebsiella 7/9/21.  Of note, was treated with tobramycin nebulizer due to concern for colonization and underlying Pseudomonas ESBL.  Completed treatment with tobramycin nebs 7/9.  Antibiotics completed per ID.   5. Essential hypertension.    6. Malnourishment with dysphagia status post G-tube placement 6/7/2021.    7. High risk   8. Leukocytosis, unspecified.  Unclear if microaspirations given blue dye findings 7/20.  Repeat WBC improved.         Plan:     Clinical status discussed today with respiratory therapist    Tracheal secretions:  moderate, blue streaks residual this morning but now cleared (had blue dye test yesterday)    Last VBG 7/20:  PH 7.43, pCO2 53     Continue phase 2 weaning as tolerates     Phase 2:  Trach mask wean as tolerated. O2 to keep sats greater than or equal to 92%.     On continuous TM since 7/18.    Holding off on vent at night unless needed or respiratory distress.     Speaking Valve (SV) trials with supervision due to blue dye findings 7/20:  brief speaking valve trials (15-20 minutes max) with direct supervision of Speech Therapy or RT only    Routine trach cares    Keep cuff  inflated due to high risk for aspiration with cuff deflated (per blue dye/Speech eval 7/20).      Current: #7 Bivona (Cuff up, placed 07/05)    Change trach every 30 days - next change 8/5    Diagnostic testing: Last chest x-ray 7/15    Pulmonary toilet: Continue scheduled DuoNebs every 6 hours, hypertonic saline nebs 2x/day    On 7/19: D/c'ed Robinul q8H and also mucomyst nebs Q12H as I feel they are competing with each other.     On 7/20: Scopolamine patch per primary team.    Will monitor secretion management with scopolamine patch    Follow-up WBC tomorrow.    Thank you for allowing our service to participate in the care of this patient.  Please call with any questions or concerns.    Total patient care time: 35 minutes, with over 50% spent in counseling/coordination of care.      Rehana Kauffman MD, MPH  Pulmonary/Critical Care Medicine  07/21/2021  3:57 PM        Subjective/Interval History:   Last night was 3rd night off vent overnight per RT.   On TM.  Blue dye test yesterday - findings of aspiration risk with cuff deflated.    Started on scopolamine patch yesterday     Review of Systems:  Pertinent items are noted in HPI.        Allergies/Medications:   Allergies:   No Known Allergies    Continuous Infusions:    sodium chloride       Scheduled Medications:    famotidine  20 mg Oral or Feeding Tube BID     fiber modular (NUTRISOURCE FIBER)  1 packet Per Feeding Tube TID     heparin ANTICOAGULANT  5,000 Units Subcutaneous Q8H     ipratropium - albuterol 0.5 mg/2.5 mg/3 mL  3 mL Nebulization Q6H     lisinopril  40 mg Oral or Feeding Tube Daily     menthol-zinc oxide   Topical TID     methylphenidate  10 mg Oral or Feeding Tube QAM AC     mirtazapine  7.5 mg Per Feeding Tube At Bedtime     nystatin  500,000 Units Mouth/Throat 4x Daily     scopolamine  1 patch Transdermal Q72H    And     scopolamine   Transdermal Q8H     sodium chloride (PF)  3 mL Intracatheter Q8H     venlafaxine  37.5 mg Per Feeding Tube TID  "w/meals          Objective:   Vitals:  /76 (BP Location: Left arm)   Pulse 88   Temp 97.5  F (36.4  C) (Oral)   Resp 20   Ht 1.803 m (5' 10.98\")   Wt 64.9 kg (143 lb)   SpO2 99%   BMI 19.95 kg/m    Vent:  Ventilation Mode: Trach collar  FiO2 (%): 25 %  Oxygen Concentration (%): 25 %  Resp: 20    GEN: Awake, working with speech therapy.  Wearing hand mitts.   HEENT: Temporal wasting bilaterally.  Normocephalic, atraumatic.  Extraoccular eye movements intact, anicteric sclera. No sinus tenderness to palpation.  Moist mucous membranes.   NECK: Supple.  #7 Bivona, on trach mask.  PULM: Non-labored breathing.  No use of accessory muscles.  Clear to auscultation.  CVS: Regular rate and rhythm.  Normal S1, S2.  No rubs, murmurs, or gallops.    ABDOMEN: Normoactive bowel sounds.   Non-distended.    EXTREMITES:  No clubbing, cyanosis, or edema.    NEURO:  Awake, moving hands (in mitts).  Slowly nods yes/no to questions.     Intake/Output:  I/O last 3 completed shifts:  In: 2707 [NG/GT:2707]  Out: 1856 [Urine:1400; Emesis/NG output:456]        Pertinent Studies:   All laboratory data reviewed  Serum Glucose range:   Recent Labs   Lab 07/21/21  0703 07/19/21  1722 07/19/21  0615 07/16/21  0543    130* 128* 133*     ABG:   Recent Labs   Lab 07/21/21  1107   PH 7.42     CBC:   Recent Labs   Lab 07/20/21  1449 07/20/21  0650 07/19/21  0615 07/16/21  0543   WBC 12.0* 13.7* 9.7 9.1   HGB  --  10.3* 10.0* 10.3*   HCT  --  31.4* 31.2* 31.3*   MCV  --  96 97 95   PLT  --  462* 413 346   NEUTROPHIL  --  81 75 72   LYMPH  --  10 14 14   MONOCYTE  --  6 7 8   EOSINOPHIL  --  2 3 4     Chemistry:   Recent Labs   Lab 07/21/21  0703 07/19/21  0615 07/16/21  0543    141 140   POTASSIUM 4.7 4.5 4.0   CHLORIDE 100 102 100   CO2 31 28 30   BUN 23* 18 24*   CR 0.61* 0.53* 0.58*   GFRESTIMATED >90 >90 >90   RAGINI 10.6* 10.3 10.1     Coags:  No results for input(s): INR, PROTIME, PTT in the last 168 hours.    Invalid " input(s): APTT  Cardiac Markers:  No results for input(s): CKTOTAL, TROPONINI in the last 168 hours.     Microbiology:    Sputum culture, 7/9:  2+ Klebsiella oxytoca    ET aspirate, 6/19: Pseudomonas aeruginosa    Blood cultures x 2, 7/9:  No growth    COVID19 PCR, 7/20: Negtive        Cardiology/Radiology:   Cardiac: All cardiac studies reviewed by me.    TTE, 5/27:  Global and regional left ventricular function is normal with an EF of 60-65%.  Global right ventricular function is normal. The peak velocity of the tricuspid regurgitant jet is not obtainable. Pulmonary artery systolic pressure cannot be assessed. IVC diameter and respiratory changes fall into an intermediate range suggesting an RA pressure of 8 mmHg.    Radiology: All imaging studies reviewed by me.    Chest X-Ray, 7/15:  No change in tracheostomy tube. Heart and mediastinal size are stable. There is indistinctness of the pulmonary interstitium, representing either airway inflammation or edema. There is bandlike opacity involving the retrocardiac region of the left lung base, increased from prior study and representing either atelectasis or infectious process. No pleural effusion or pneumothorax. There is some mild elevation of the right hemidiaphragm.

## 2021-07-21 NOTE — PLAN OF CARE
Problem: OT General Care Plan  Goal: Hygiene/Grooming (OT)  Description: Hygiene/Grooming (OT)  Outcome: No Change  Goal: Upper Body Dressing (OT)  Description: Upper Body Dressing (OT)  Outcome: No Change  Goal: Bed Mobility (OT)  Description: Bed Mobility (OT)  Outcome: No Change  Goal: Transfer (OT)  Description: Transfer (OT)  Outcome: No Change  Goal: Cognitive (OT)  Description: Cognitive (OT)  Outcome: No Change  Goal: OT Goal 1  Description: OT Goal 1  Outcome: No Change  Flowsheets (Taken 7/21/2021 0971)  OT goal 1: Pt to demo sitting balance max A x2 minutes w/Ax1 staff. - new  Goal: OT Goal 2  Description: OT Goal 2  Outcome: No Change  Goal: OT Goal 3  Description: OT Goal 3  Outcome: No Change  Goal: OT Goal 4  Description: OT Goal 4  Outcome: No Change     Problem: OT General Care Plan  Goal: OT Frequency  Outcome: No Change  Flowsheets (Taken 7/21/2021 0978)  OT Frequency: x5 wkly    No change in goal status. Cont to benefit from skilled OT/current goals to address noted areas.     Manuelito Liu, OTRL/CBIS

## 2021-07-21 NOTE — PROGRESS NOTES
[facility]    Medicine Progress Note - Hospitalist Service       Date of Admission:  6/11/2021    Identifier:  71 yo M with h/o HTN who presented to ED on 5/15/2021 with acute SAH after a fall with unresponsiveness.  He underwent emergent left external ventricular drain placement.  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  On 5/28/2021, patient had persistent cerebral vasospasm, CT head showed bilateral BAYLEE infarct and was treated with milrinone and verapamil.  On 6/1/2021, angiogram showed no evidence of vasospasm.  Extubated on 6/3/2021.  6/6/2021 he was reintubated due to hypoxia.  On 6/7/2021, patient underwent tracheostomy and PEG tube placement.  EVD removed 6/9/2021.    Unasyn for possible pneumonia on 5/20/2021 which was switched to ceftriaxone the next day for sputum culture growing Klebsiella.  On 5/23, sputum culture also grew Pseudomonas so switched to Zosyn.  Due to increased WBCs in CSF possibility of ventriculitis was raised however CSF culture was negative but antibiotic was switched to cefepime and vancomycin on 5/28/2021 for 2 weeks.  Vanco was discontinued on 6/9/2021.  On 6/5/2021, sputum grew ESBL so cefepime was switched to meropenem.  He was transferred to [facility] on 6/11/21. Repeat sputum culture 6/19 showed MDR pseudomonas and he was started on tobramycin nebs from 6/24/2021 - 7/9/2021 . He has persistent tracheal secretions.  6/14/21 CT head done for fatigue- read as slight increase in caliber of lateral and third ventricle with possibility of developing communicating hydrocephalus. Discussed with Dr. Casillas (neurosurgeon at [facility]), who did not think there was much change, so repeat CT head on 6/16/21 was done which did not show any change.     RT and pulmonary are following and pt is on phase 2 weaning with TM/PMV during day and full vent at night. Vent weaning slowed due to poor cough and  secretions.Mental status is slowly improving. He is tolerating tube feeding.     7/8/2021 aspirated tube feeds.  Spiked a fever that evening 102.8 and WBC went up to 36.9 the next morning so ID reconsulted and was started on ceftazadime-avibactam along with flagyl 7/9/2021 - 7/18/21.  In the last 1 month he has not been able to be liberated from the ventilator.  He has had repeated bouts of Klebsiella infection in sputum and ventilator associated pneumonia.  Additionally he has had a large amount of tracheal secretions, significant cognitive impairment, left-sided neglect and ongoing episodes of agitation.  Patient's family has been monitoring the patient continuously during the daytime hours via video monitor and visit with him in person over the weekends.  Multiple specialties have been involved in the care of this patient including palliative care, infectious disease pulmonary medicine and neurology amongst others.        Assessment & Plan           1/.  Acute hypoxemic respiratory failure with prolonged hospitalization after initial significant intracranial hemorrhage.  One of the biggest barriers to decannulation is related to the issue of excessive secretions.  Glycopyrrolate was tried and stopped.  Also diuresed him with Lasix for 3 doses with some improvement however that improvement seems to have stabilized or plateaued.  Scopolamine started 7/20/2021  Speaking valve is being attempted in short trials but does aspirate with blue dye test so needs to be only with speech/RT present.  2/.  Insomnia: was on Ritalin which was decreased due to insomnia.    Started with nighttime mirtazapine 7.5 mg scheduled.  Can be uptitrated as appropriate.   3/.  Traumatic brain injury/intracranial hemorrhage: Effexor was started about a week ago at 25 mg 3 times a day and was uptitrated 7/20/21 to 37.53 times a day.  Will need follow up with Dr. Martinez with Neurosurgery in 3 months with repeat CTA of head and neck for post-op  f/u.  4/.  Dysphagia: Secondary to respiratory failure continue with tube feeds at this time.  Patient failed the blue dye test with immediate aspiration, continue to use tube feeds  5/.  Ventilator associated pneumonia appreciate intervention and recommendations by infectious disease antibiotics finished on July 18.  6/.  Prognosis and logistics: This is challenge as the patient is unlikely going to be his prehospitalization self.  7/.  Care progression conference scheduled for 2 PM on this Friday for a family care conference.  8/.  Restraints: We have been using mittens and that seems to be eliminating the need for a sitter.  9/. 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   Continue pulmonary toilet, scheduled duonebs  10/. Acute metabolic encephalopathy - trial of Ritalin did not seem to help.  Slow to improve  11/. Anemia - likely ACD  12/. Hypertension - on lisinopril  13/. Sebaceous cyst left inguinal area along with left inguinal hernia - needs outpatient surgical f/u  14/. Severe debility, weakness, critical illness, deconditioning, Continue PT/OT     Diet: Adult Formula Drip Feeding: Continuous Osmolite 1.5; Gastrostomy; Goal Rate: 90; mL/hr; Medication - Feeding Tube Flush Frequency: 240 mL q 4 hours water flushes; Amount to Send (Nutrition use): send 3 packets Nutrisource Fiber daily; Tube Feeding...    DVT Prophylaxis: Heparin SQ  Gutierrez Catheter: Not present  Central Lines: None  Code Status: Full Code      Disposition Plan   Expected discharge: Unclear at this time recommended to transitional care unit once Liberation from the ventilator.     The patient's care was discussed with the Bedside Nurse, Care Coordinator/ and Patient's Family.    Tomas Cormier MD  Hospitalist Service  [facility]      ______________________________________________________________________      Data reviewed  today: I reviewed all medications, new labs and imaging results over the last 24 hours.     Physical Exam   Vital Signs: Temp: 97.5  F (36.4  C) Temp src: Oral BP: 116/76 Pulse: 88   Resp: 20 SpO2: 99 % O2 Device: Trach dome Oxygen Delivery: 30 LPM  Weight: 143 lbs 0 oz  General Appearance: Awake sitting in bed, left-sided neglect but responds to voice, said hello  Respiratory: Coarse breath sounds tracheostomy in place  Cardiovascular: S1-S2 regular rate and rhythm  GI: Soft nondistended bowel sounds are present feeding tube noted  Skin: No rashes or lesions.    Other: No lower extremity swelling  Patient is moving both upper extremities without any difficulty and constantly fidgeting  Neurological exam: moves left foot on command.  Will stick out tongue an show teeth when asked.    Data   Recent Labs   Lab 07/21/21  0703 07/20/21  1449 07/20/21  0650 07/19/21  1722 07/19/21  0615 07/16/21  0543   WBC  --  12.0* 13.7*  --  9.7 9.1   HGB  --   --  10.3*  --  10.0* 10.3*   MCV  --   --  96  --  97 95   PLT  --   --  462*  --  413 346     --   --   --  141 140   POTASSIUM 4.7  --   --   --  4.5 4.0   CHLORIDE 100  --   --   --  102 100   CO2 31  --   --   --  28 30   BUN 23*  --   --   --  18 24*   CR 0.61*  --   --   --  0.53* 0.58*   ANIONGAP 9  --   --   --  11 10   RAGINI 10.6*  --   --   --  10.3 10.1     --   --  130* 128* 133*   7/.

## 2021-07-21 NOTE — PLAN OF CARE
Problem: Restraint for Non-Violent/Non-Self-Destructive Behavior  Goal: Prevent/Manage Potential Problems  Description: Maintain safety of patient and others during period of restraint.  Promote psychological and physical wellbeing.  Prevent injury to skin and involved body parts.  Promote nutrition, hydration, and elimination.  Outcome: No Change   Pt still on bilateral mitts for safety.  Problem: Adult Inpatient Plan of Care  Goal: Plan of Care Review  Outcome: Improving   He was up in the chair for 5 hrs this morning. Tolerating trach capping. Prn suctioning done. Resting in bed at this time.

## 2021-07-22 ENCOUNTER — APPOINTMENT (OUTPATIENT)
Dept: SPEECH THERAPY | Facility: CLINIC | Age: 71
DRG: 207 | End: 2021-07-22
Attending: HOSPITALIST
Payer: COMMERCIAL

## 2021-07-22 ENCOUNTER — APPOINTMENT (OUTPATIENT)
Dept: OCCUPATIONAL THERAPY | Facility: CLINIC | Age: 71
DRG: 207 | End: 2021-07-22
Attending: HOSPITALIST
Payer: COMMERCIAL

## 2021-07-22 ENCOUNTER — APPOINTMENT (OUTPATIENT)
Dept: PHYSICAL THERAPY | Facility: CLINIC | Age: 71
DRG: 207 | End: 2021-07-22
Attending: HOSPITALIST
Payer: COMMERCIAL

## 2021-07-22 LAB
ANION GAP SERPL CALCULATED.3IONS-SCNC: 10 MMOL/L (ref 5–18)
BASOPHILS # BLD AUTO: 0.1 10E3/UL (ref 0–0.2)
BASOPHILS NFR BLD AUTO: 1 %
BUN SERPL-MCNC: 24 MG/DL (ref 8–22)
CALCIUM SERPL-MCNC: 10.6 MG/DL (ref 8.5–10.5)
CHLORIDE BLD-SCNC: 100 MMOL/L (ref 98–107)
CO2 SERPL-SCNC: 30 MMOL/L (ref 22–31)
CREAT SERPL-MCNC: 0.63 MG/DL (ref 0.7–1.3)
EOSINOPHIL # BLD AUTO: 0.3 10E3/UL (ref 0–0.7)
EOSINOPHIL NFR BLD AUTO: 4 %
ERYTHROCYTE [DISTWIDTH] IN BLOOD BY AUTOMATED COUNT: 14.6 % (ref 10–15)
GFR SERPL CREATININE-BSD FRML MDRD: >90 ML/MIN/1.73M2
GLUCOSE BLD-MCNC: 120 MG/DL (ref 70–125)
HCT VFR BLD AUTO: 32.1 % (ref 40–53)
HGB BLD-MCNC: 10.3 G/DL (ref 13.3–17.7)
HOLD SPECIMEN: NORMAL
IMM GRANULOCYTES # BLD: 0.1 10E3/UL
IMM GRANULOCYTES NFR BLD: 1 %
LYMPHOCYTES # BLD AUTO: 1.4 10E3/UL (ref 0.8–5.3)
LYMPHOCYTES NFR BLD AUTO: 17 %
MCH RBC QN AUTO: 31 PG (ref 26.5–33)
MCHC RBC AUTO-ENTMCNC: 32.1 G/DL (ref 31.5–36.5)
MCV RBC AUTO: 97 FL (ref 78–100)
MONOCYTES # BLD AUTO: 0.7 10E3/UL (ref 0–1.3)
MONOCYTES NFR BLD AUTO: 8 %
NEUTROPHILS # BLD AUTO: 6 10E3/UL (ref 1.6–8.3)
NEUTROPHILS NFR BLD AUTO: 69 %
NRBC # BLD AUTO: 0 10E3/UL
NRBC BLD AUTO-RTO: 0 /100
PLATELET # BLD AUTO: 499 10E3/UL (ref 150–450)
POTASSIUM BLD-SCNC: 4.4 MMOL/L (ref 3.5–5)
RBC # BLD AUTO: 3.32 10E6/UL (ref 4.4–5.9)
SODIUM SERPL-SCNC: 140 MMOL/L (ref 136–145)
WBC # BLD AUTO: 8.5 10E3/UL (ref 4–11)

## 2021-07-22 PROCEDURE — 250N000013 HC RX MED GY IP 250 OP 250 PS 637

## 2021-07-22 PROCEDURE — 97112 NEUROMUSCULAR REEDUCATION: CPT | Mod: GP | Performed by: PHYSICAL THERAPIST

## 2021-07-22 PROCEDURE — 250N000011 HC RX IP 250 OP 636

## 2021-07-22 PROCEDURE — 120N000017 HC R&B RESPIRATORY CARE

## 2021-07-22 PROCEDURE — 97530 THERAPEUTIC ACTIVITIES: CPT | Mod: GP | Performed by: PHYSICAL THERAPIST

## 2021-07-22 PROCEDURE — 94003 VENT MGMT INPAT SUBQ DAY: CPT | Performed by: INTERNAL MEDICINE

## 2021-07-22 PROCEDURE — 999N000157 HC STATISTIC RCP TIME EA 10 MIN

## 2021-07-22 PROCEDURE — 250N000013 HC RX MED GY IP 250 OP 250 PS 637: Performed by: INTERNAL MEDICINE

## 2021-07-22 PROCEDURE — 85004 AUTOMATED DIFF WBC COUNT: CPT | Performed by: INTERNAL MEDICINE

## 2021-07-22 PROCEDURE — 99207 PR CDG-CODE CATEGORY CHANGED: CPT | Performed by: INTERNAL MEDICINE

## 2021-07-22 PROCEDURE — 82310 ASSAY OF CALCIUM: CPT | Performed by: INTERNAL MEDICINE

## 2021-07-22 PROCEDURE — 250N000009 HC RX 250: Performed by: INTERNAL MEDICINE

## 2021-07-22 PROCEDURE — 94640 AIRWAY INHALATION TREATMENT: CPT | Mod: 76

## 2021-07-22 PROCEDURE — 36415 COLL VENOUS BLD VENIPUNCTURE: CPT | Performed by: INTERNAL MEDICINE

## 2021-07-22 RX ADMIN — IPRATROPIUM BROMIDE AND ALBUTEROL SULFATE 3 ML: .5; 3 SOLUTION RESPIRATORY (INHALATION) at 13:32

## 2021-07-22 RX ADMIN — IPRATROPIUM BROMIDE AND ALBUTEROL SULFATE 3 ML: .5; 3 SOLUTION RESPIRATORY (INHALATION) at 02:27

## 2021-07-22 RX ADMIN — VENLAFAXINE 37.5 MG: 37.5 TABLET ORAL at 12:55

## 2021-07-22 RX ADMIN — VENLAFAXINE 37.5 MG: 37.5 TABLET ORAL at 16:32

## 2021-07-22 RX ADMIN — METHYLPHENIDATE HYDROCHLORIDE 10 MG: 10 TABLET ORAL at 06:12

## 2021-07-22 RX ADMIN — HEPARIN SODIUM 5000 UNITS: 5000 INJECTION, SOLUTION INTRAVENOUS; SUBCUTANEOUS at 13:00

## 2021-07-22 RX ADMIN — HEPARIN SODIUM 5000 UNITS: 5000 INJECTION, SOLUTION INTRAVENOUS; SUBCUTANEOUS at 06:12

## 2021-07-22 RX ADMIN — Medication 1 PACKET: at 13:00

## 2021-07-22 RX ADMIN — NYSTATIN 500000 UNITS: 100000 SUSPENSION ORAL at 16:32

## 2021-07-22 RX ADMIN — FAMOTIDINE 20 MG: 20 TABLET, FILM COATED ORAL at 21:07

## 2021-07-22 RX ADMIN — NYSTATIN 500000 UNITS: 100000 SUSPENSION ORAL at 08:07

## 2021-07-22 RX ADMIN — IPRATROPIUM BROMIDE AND ALBUTEROL SULFATE 3 ML: .5; 3 SOLUTION RESPIRATORY (INHALATION) at 19:02

## 2021-07-22 RX ADMIN — NYSTATIN 500000 UNITS: 100000 SUSPENSION ORAL at 12:55

## 2021-07-22 RX ADMIN — ANORECTAL OINTMENT: 15.7; .44; 24; 20.6 OINTMENT TOPICAL at 08:07

## 2021-07-22 RX ADMIN — IPRATROPIUM BROMIDE AND ALBUTEROL SULFATE 3 ML: .5; 3 SOLUTION RESPIRATORY (INHALATION) at 07:36

## 2021-07-22 RX ADMIN — HEPARIN SODIUM 5000 UNITS: 5000 INJECTION, SOLUTION INTRAVENOUS; SUBCUTANEOUS at 21:07

## 2021-07-22 RX ADMIN — MIRTAZAPINE 7.5 MG: 7.5 TABLET ORAL at 21:07

## 2021-07-22 RX ADMIN — VENLAFAXINE 37.5 MG: 37.5 TABLET ORAL at 08:07

## 2021-07-22 RX ADMIN — NYSTATIN 500000 UNITS: 100000 SUSPENSION ORAL at 21:07

## 2021-07-22 RX ADMIN — ANORECTAL OINTMENT: 15.7; .44; 24; 20.6 OINTMENT TOPICAL at 13:00

## 2021-07-22 RX ADMIN — FAMOTIDINE 20 MG: 20 TABLET, FILM COATED ORAL at 08:07

## 2021-07-22 RX ADMIN — Medication 1 PACKET: at 10:29

## 2021-07-22 RX ADMIN — ANORECTAL OINTMENT: 15.7; .44; 24; 20.6 OINTMENT TOPICAL at 21:08

## 2021-07-22 RX ADMIN — LISINOPRIL 40 MG: 20 TABLET ORAL at 18:00

## 2021-07-22 RX ADMIN — Medication 1 PACKET: at 21:07

## 2021-07-22 ASSESSMENT — MIFFLIN-ST. JEOR: SCORE: 1455.03

## 2021-07-22 NOTE — PLAN OF CARE
Problem: Communication Impairment (Artificial Airway)  Goal: Effective Communication  Outcome: Improving     Problem: Skin and Tissue Injury (Artificial Airway)  Goal: Absence of Device-Related Skin or Tissue Injury  Outcome: Improving   Pt vitals stable. Pt alert and able to answer yes or no questions at time. Able to follow  simple commands. Was calm and comfortable all shift.  Skin remain clean and intact. Will continue to monitor.

## 2021-07-22 NOTE — PROGRESS NOTES
RT PROGRESS NOTE    DATA  TRACH TYPE -- Bivona #7 Cuffed (Placed 6/7; Last Changed 7/5)    ACTION  THERAPIES -- Q6 Duoneb   SUCTION   FREQUENCY -- 2   AMOUNT -- thick    CONSISTENCY -- large   COLOR -- white/yellow    SPONTANEOUS COUGH -- Good  WEAN PHASE #3   MODE -- HTD 30 LPM 25%   DURATION -- 24/7   END REASON -- N/A    RESPONSE  BREATH SOUNDS -- clear to coarse  VITAL SIGNS --  Temp:  [97.5  F (36.4  C)-98.6  F (37  C)] 98  F (36.7  C)  Pulse:  [81-95] 90  Resp:  [20-22] 20  BP: (116-120)/(70-76) 119/74  FiO2 (%):  [25 %-30 %] 25 %  SpO2:  [92 %-99 %] 95 %  EMOTIONAL CONCERNS -- No  RISK FOR SELF-DECANNULATION -- Yes; Mitt restraints in place    NOTE   No signs of respiratory distress. RT will continue to monitor.     Kayla Lux, RT

## 2021-07-22 NOTE — PLAN OF CARE
Problem: Communication Impairment (Artificial Airway)  Goal: Effective Communication  Outcome: Improving     Problem: Device-Related Complication Risk (Artificial Airway)  Goal: Optimal Device Function  Outcome: Improving     Problem: Skin and Tissue Injury (Artificial Airway)  Goal: Absence of Device-Related Skin or Tissue Injury  Outcome: Improving   RT PROGRESS NOTE     DATA:     CURRENT SETTINGS:               TRACH TYPE / SIZE: #7 BIVONA TTS Changed on 7/5/21             MODE:  HFTM/cuff up             FIO2:  25%@30 L/MIN     ACTION:             THERAPIES: Duoneb q6hr                SUCTION:                           FREQUENCY: x x9                        AMOUNT: small to copious                         CONSISTENCY: thick/thin                        COLOR:  white             SPONTANEOUS COUGH EFFORT/STRENGTH OF EFFORT (not elicited by suctioning): Fair                               WEANING PHASE: 2                        WEAN MODE: 25% @30 L/MIN                        WEAN TIME:  Cont.                        END WEAN REASON:       RESPONSE:             BS:  Faint coarse              VITAL SIGNS: sat 93-95%, HR 84-92, RR 20-22             EMOTIONAL NEEDS / CONCERNS:confused               RISK FOR SELF DECANNULATION: yes                        RISK DUE TO:  Confused                         INTERVENTION/S IN PLACE IS/ARE: one hand  mitten       NOTE / PLAN:  Cont. Current plan of care / PMV wore  20 Minutes  With Speech

## 2021-07-22 NOTE — PROGRESS NOTES
Social Work Note:    Patient has care progression meeting scheduled for Friday 07/23/21 @ 14:00    Dayron Chen [facility] KIRAN/[place]  [phone]

## 2021-07-22 NOTE — PLAN OF CARE
Problem: Restraint for Non-Violent/Non-Self-Destructive Behavior  Goal: Prevent/Manage Potential Problems  Description: Maintain safety of patient and others during period of restraint.  Promote psychological and physical wellbeing.  Prevent injury to skin and involved body parts.  Promote nutrition, hydration, and elimination.  Outcome: No Change   Pt still on bilateral mitts on both arms for safety. Monitored Q2H.  Problem: Communication Impairment (Artificial Airway)  Goal: Effective Communication  Outcome: No Change   Pt tolerating trach mask. Up on the chair for physical therapy at 1030H and recreational therapy at 1400H. Had an extra large BM x1. Seen by speech therapy. Suctioned prn 3x.  Resting in bed at this time.

## 2021-07-22 NOTE — PLAN OF CARE
Problem: Communication Impairment (Artificial Airway)  Goal: Effective Communication  Outcome: Improving     Problem: Device-Related Complication Risk (Artificial Airway)  Goal: Optimal Device Function  Outcome: Improving  Intervention: Optimize Device Care and Function  Recent Flowsheet Documentation  Taken 7/21/2021 1934 by Kayla Small, RT  Airway Safety Measures:    all equipment/monitors on and audible    manual resuscitator/mask/valve in room    suction at bedside    suction regulator    suction equipment    oxygen flowmeter     Problem: Skin and Tissue Injury (Artificial Airway)  Goal: Absence of Device-Related Skin or Tissue Injury  Outcome: Improving     Problem: Communication Impairment (Mechanical Ventilation, Invasive)  Goal: Effective Communication  Outcome: Completed     Problem: Device-Related Complication Risk (Mechanical Ventilation, Invasive)  Goal: Optimal Device Function  Outcome: Completed  Intervention: Optimize Device Care and Function  Recent Flowsheet Documentation  Taken 7/21/2021 1934 by Kayla Small, RT  Airway Safety Measures:    all equipment/monitors on and audible    manual resuscitator/mask/valve in room    suction at bedside    suction regulator    suction equipment    oxygen flowmeter     Problem: Inability to Wean (Mechanical Ventilation, Invasive)  Goal: Mechanical Ventilation Liberation  Outcome: Completed     Problem: Ventilator-Induced Lung Injury (Mechanical Ventilation, Invasive)  Goal: Absence of Ventilator-Induced Lung Injury  Outcome: Completed

## 2021-07-22 NOTE — PROGRESS NOTES
Pulmonary Medicine Follow up Note - Ventilator Rounds:    Clinical status discussed today with RN and respiratory therapist..    Chief complaint: Ongoing respiratory failure  Significant change in clinical status during past 24 hours? -  Tolerating trach mask 24 hours moderate trach secretions       Ventilation Mode: Trach collar  FiO2 (%): 25 %  Oxygen Concentration (%): 25 %  Resp: 20    Tracheal secretions: large white/ yellow secretions   Current phase of ventilator weaning pathway:  Phase 2  Ventilator weaning results from yesterday: 24 hours trach mask    Current Facility-Administered Medications   Medication     acetaminophen (TYLENOL) solution 650 mg    Or     acetaminophen (TYLENOL) tablet 650 mg     alteplase (CATHFLO ACTIVASE) injection 2 mg     bisacodyl (DULCOLAX) Suppository 10 mg     dextrose 50 % injection 25-50 mL    Or     glucagon injection 1 mg     docusate (COLACE) 50 MG/5ML liquid 100 mg    Or     docusate sodium (COLACE) capsule 100 mg     famotidine (PEPCID) tablet 20 mg     fiber modular (NUTRISOURCE FIBER) packet 1 packet     heparin ANTICOAGULANT injection 5,000 Units     hydrALAZINE (APRESOLINE) injection 10 mg     ipratropium - albuterol 0.5 mg/2.5 mg/3 mL (DUONEB) neb solution 3 mL     lisinopril (ZESTRIL) tablet 40 mg     loperamide (IMODIUM) liquid 1 mg     magnesium hydroxide (MILK OF MAGNESIA) suspension 30 mL     menthol-zinc oxide (CALMOSEPTINE) 0.44-20.6 % ointment OINT     methylphenidate (RITALIN) tablet 10 mg     mirtazapine (REMERON) tablet TABS 7.5 mg     naloxone (NARCAN) injection 0.2 mg    Or     naloxone (NARCAN) injection 0.4 mg    Or     naloxone (NARCAN) injection 0.2 mg    Or     naloxone (NARCAN) injection 0.4 mg     nystatin (MYCOSTATIN) suspension 500,000 Units     ondansetron (ZOFRAN) solution 4 mg     polyethylene glycol (MIRALAX) Packet 17 g     scopolamine (TRANSDERM) 72 hr patch 1 patch    And     scopolamine (TRANSDERM-SCOP) Patch in Place     sodium  "chloride (PF) 0.9% PF flush 3 mL     sodium chloride (PF) 0.9% PF flush 3 mL     sodium chloride 0.9% infusion     venlafaxine (EFFEXOR) tablet 37.5 mg       Physical exam     BP (!) 145/81 (BP Location: Left arm)   Pulse 84   Temp 98.5  F (36.9  C) (Axillary)   Resp 20   Ht 1.803 m (5' 10.98\")   Wt 66.3 kg (146 lb 3.2 oz)   SpO2 91%   BMI 20.40 kg/m      Gen: awake, poorly interactive, trach on trach mask  HEENT: pale conjunctiva, moist mucosa  Neck: s/p trach   Lungs: discrete ronchi both HT  CV: regular, no murmurs or gallops appreciated  Abdomen: soft, NT, BS wnl  Ext: no edema  Neuro: awake, poor interaction, moves spontaneously upper and lower extremities    Lab Results   Component Value Date    WBC 8.5 07/22/2021    WBC 8.2 06/11/2021     Lab Results   Component Value Date    RBC 3.32 07/22/2021    RBC 2.69 06/11/2021     Lab Results   Component Value Date    HGB 10.3 07/22/2021    HGB 8.2 06/11/2021     Lab Results   Component Value Date    HCT 32.1 07/22/2021    HCT 26.8 06/11/2021     No components found for: MCT  Lab Results   Component Value Date    MCV 97 07/22/2021     06/11/2021     Lab Results   Component Value Date    MCH 31.0 07/22/2021    MCH 30.5 06/11/2021     Lab Results   Component Value Date    MCHC 32.1 07/22/2021    MCHC 30.6 06/11/2021     Lab Results   Component Value Date    RDW 14.6 07/22/2021    RDW 13.7 06/11/2021     Lab Results   Component Value Date     07/22/2021     06/11/2021     Last Comprehensive Metabolic Panel:  Sodium   Date Value Ref Range Status   07/22/2021 140 136 - 145 mmol/L Final   06/11/2021 143 133 - 144 mmol/L Final     Potassium   Date Value Ref Range Status   07/22/2021 4.4 3.5 - 5.0 mmol/L Final   06/11/2021 3.8 3.4 - 5.3 mmol/L Final     Chloride   Date Value Ref Range Status   07/22/2021 100 98 - 107 mmol/L Final   06/11/2021 106 94 - 109 mmol/L Final     Carbon Dioxide   Date Value Ref Range Status   06/11/2021 36 (H) 20 - 32 " mmol/L Final     Carbon Dioxide (CO2)   Date Value Ref Range Status   07/22/2021 30 22 - 31 mmol/L Final     Anion Gap   Date Value Ref Range Status   07/22/2021 10 5 - 18 mmol/L Final   06/11/2021 2 (L) 3 - 14 mmol/L Final     Glucose   Date Value Ref Range Status   07/22/2021 120 70 - 125 mg/dL Final   06/11/2021 142 (H) 70 - 99 mg/dL Final     Urea Nitrogen   Date Value Ref Range Status   07/22/2021 24 (H) 8 - 22 mg/dL Final   06/11/2021 25 7 - 30 mg/dL Final     Creatinine   Date Value Ref Range Status   07/22/2021 0.63 (L) 0.70 - 1.30 mg/dL Final   06/11/2021 0.57 (L) 0.66 - 1.25 mg/dL Final     GFR Estimate   Date Value Ref Range Status   07/22/2021 >90 >60 mL/min/1.73m2 Final     Comment:     As of July 11, 2021, eGFR is calculated by the CKD-EPI creatinine equation, without race adjustment. eGFR can be influenced by muscle mass, exercise, and diet. The reported eGFR is an estimation only and is only applicable if the renal function is stable.   07/09/2021 >60 >60 mL/min/1.73m2 Final   06/11/2021 >90 >60 mL/min/[1.73_m2] Final     Comment:     Non  GFR Calc  Starting 12/18/2018, serum creatinine based estimated GFR (eGFR) will be   calculated using the Chronic Kidney Disease Epidemiology Collaboration   (CKD-EPI) equation.       Calcium   Date Value Ref Range Status   07/22/2021 10.6 (H) 8.5 - 10.5 mg/dL Final   06/11/2021 9.6 8.5 - 10.1 mg/dL Final       XR CHEST PORT 1 VIEW  LOCATION: [facility]  DATE/TIME: 7/15/2021 7:50 AM  INDICATION: New VAP followup.  COMPARISON: 07/09/2021.  IMPRESSION:   No change in tracheostomy tube. Heart and mediastinal size are stable. There is indistinctness of the pulmonary interstitium, representing either airway inflammation or edema. There is bandlike opacity involving the retrocardiac region of the left lung base, increased from prior study and representing either atelectasis or infectious process. No pleural effusion or pneumothorax. There is  some mild elevation of the right hemidiaphragm.    Diagnosis:     Patient is a 71 yo man with history of hypertension. Patient initially presented on 15-May-2021 to [facility] after being found unresponsive by his wife. Patient was then found to have a subarachnoid hemorrhage secondary to a ruptured aneurysm. Patient was intubated and was started on mannitol, IV antihypertensives and hyperventilation and patient was then transferred to [facility]. Patient underwent aneurysm clipping as well as placement of extra-ventricular drain from hydrocephalus on 15-May-2021. Extra-ventricular drain would malfunction and would require replacement on 04-Jun-2021 and 06-Jun-2021. Extra-ventricular drain reportedly was removed on 09-Jun-2021. Patient had intra-arterial vasospasm treatment with verapamil on 28-May-2021.  Patient was extubated on 16-May-2021 but had to be reintubated on 19-May-2021. Patient would be extubated on 03-Jun-2021 but would be reintubated on 06-Jun-2021. Patient had tracheostomy and PEG-tube placed on 07-Jun-2021. Patient required treatment for hospital-acquired pneumonia. Patient was transferred to LTAC on 11-Jun-2021.    1. Acute respiratory failure s/p trach 6/7/2021  2. S/p treatment pseudomonas ESBL and klebsiella pneumonia   3. Encephalopathy   4. Subarachnoid bleed s/p craniotomy and clipping P-comm rupture aneurysm.   5. HTN  6. Malnourishment   7. Dysphagia s/p G tube    Recommendations/Plans (MDM):  1. Weaning orders: continue phase 2 weaning trial   2. Trach change? no  3. Diagnostic testing? no  4. Continue pulmonary toiletting scheduled albuterol  6. DVT prophylaxis heparin SQ    Marcelino Navarro  Pulmonary / Critical Care  7/22/2021  4:07 PM

## 2021-07-22 NOTE — PROGRESS NOTES
[facility]     Medicine Progress Note - Hospitalist Service       Date of Admission:  6/11/2021    Identifier:  70-year-old retired man who lived with his wife at home prehospitalization, who originally presented to [facility] on May 15, 2021 for altered mental state he had sustained a fall and was found to be unresponsive by his wife.  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  He was treated with milrinone and verapamil.  He was successfully extubated on Pamela 3 and on June 6 he was reintubated due to drop in his saturations and underwent tracheostomy placement as well as feeding tube placement on June 7.  June 9: EVD was removed.  He was transferred to LTAC on June 11  In the last 1 month he has not been able to be liberated from the ventilator.  He has had repeated bouts of Klebsiella infection in sputum and ventilator associated pneumonia.  Additionally he has had a large amount of tracheal secretions, significant cognitive impairment, left-sided neglect and ongoing episodes of agitation.  Patient's family has been monitoring the patient continuously during the daytime hours via video monitor and visit with him in person over the weekends.  Multiple specialties have been involved in the care of this patient including palliative care, infectious disease pulmonary medicine and neurology amongst others.    Previously patient was very restless at night and yesterday we adjusted medications as well as diuresed patient in an attempt to decrease the secretions and it seems to help.  Secretions continue to be a challenge and there seems to be a little bit of  a back-and-forth.  Per recs of pulmonary medicine has recommended discontinuation of Robinul but in that case there is a lot of watery secretions.  Patient spends most of his day sitting up in the chair and as mentioned his family is continuously monitoring him      Assessment & Plan           1/.  Acute hypoxemic respiratory failure due to prolonged hospitalization, also because of significant intracranial hemorrhage.  One of the biggest barriers to decannulation is related to the issue of excessive secretions and these need to be addressed appropriately.  glycopyrolate was d/thang 2 day back.per recs of pulmonary meds  Also diuresed him with Lasix for 3 doses with significant improvement however that improvement seems to have stabilized or plateaued  Speaking valve is being attempted in short trials, the concern is that of aspiration hence a blue dye test will be ordered  2/.  Insomnia: Definitely making matters worse for the patient as I review his medications he is on Ritalin twice a day, getting Ritalin at bedtime is probably not the best idea for treatment of insomnia.  We will discontinue Ritalin at that time we will keep him at only once a day in the morning.  Started with nighttime mirtazapine 7.5 mg scheduled.  Which did seem to help  Can be uptitrated as appropriate.  However at this time I do not see the need to uptitrate it  3/.  Traumatic brain injury/intracranial hemorrhage: Effexor was started about a week ago at 25 mg 3 times a day we can uptitrated today to 37.53 times a day, family informed  4/.  Dysphagia: Secondary to respiratory failure continue with tube feeds at this time.  Patient failed the blue dye test with immediate aspiration, continue to use tube feeds  5/.  Ventilator associated pneumonia appreciate intervention and recommendations by infectious disease antibiotics to continued till 7/18. Off all Abx now  6/.  Prognosis and logistics: This is challenge because patient's wife is  definitely having a difficult time with the situation and recognizing that the patient is unlikely going to be his prehospitalization self.  She probably does know it but is unwilling to accept the reality which is unfortunate.  7/.  Care progression conference would be mandatory and very beneficial I would recommend having it in person versus via video.  I spoke with patient's daughter Yolanda and patient's wife Susy on the video in the presence of the patient.  They have agreed to 2 PM on this Friday for a family care conference.  Communicated to   I would encourage patient's daughter who is also involved in the care to be present for the same.  8/.  Restraints: We have been using mittens and that seems to be eliminating the need for a sitter.       Diet: Adult Formula Drip Feeding: Continuous Osmolite 1.5; Gastrostomy; Goal Rate: 90; mL/hr; Medication - Feeding Tube Flush Frequency: 240 mL q 4 hours water flushes; Amount to Send (Nutrition use): send 3 packets Nutrisource Fiber daily; Tube Feeding...    DVT Prophylaxis: Heparin SQ  Gutierrez Catheter: Not present  Central Lines: None  Code Status: Full Code      Disposition Plan   Expected discharge: Unclear at this time recommended to transitional care unit once Liberation from the ventilator.     The patient's care was discussed with the Bedside Nurse, Care Coordinator/ and Patient's Family.    Latonya Lemus MD, [facility]  Hospitalist Service  [facility]  Securely message with the Vocera Web Console (learn more here)  Text page via [employer] Paging/Directory      ______________________________________________________________________      Data reviewed today: I reviewed all medications, new labs and imaging results over the last 24 hours. I personally reviewed no images or EKG's today.    Physical Exam   Vital Signs: Temp: 98.5  F (36.9  C) Temp src: Oral BP: 127/78 Pulse: 92   Resp: 20 SpO2: 98 % O2 Device: Naehas dome  Oxygen Delivery: 30 LPM  Weight: 146 lbs 3.2 oz  General Appearance: Awake sitting in chair, left-sided neglect but responds to voice, said good morning to me when I addressed him  Respiratory: Coarse breath sounds tracheostomy in place  Cardiovascular: S1-S2 regular rate and rhythm  GI: Soft nondistended bowel sounds are present feeding tube noted  Skin: No rashes or lesions.    Other: No lower extremity swelling  Patient is moving both upper extremities without any difficulty and constantly fidgeting  Neurological exam cannot be undertaken because of patient having significant cognitive impairment    Data   Recent Labs   Lab 07/22/21  0700 07/21/21  0703 07/20/21  1449 07/20/21  0650 07/19/21  1722 07/19/21  0615   WBC 8.5  --  12.0* 13.7*  --  9.7   HGB 10.3*  --   --  10.3*  --  10.0*   MCV 97  --   --  96  --  97   *  --   --  462*  --  413    140  --   --   --  141   POTASSIUM 4.4 4.7  --   --   --  4.5   CHLORIDE 100 100  --   --   --  102   CO2 30 31  --   --   --  28   BUN 24* 23*  --   --   --  18   CR 0.63* 0.61*  --   --   --  0.53*   ANIONGAP 10 9  --   --   --  11   RAGINI 10.6* 10.6*  --   --   --  10.3    119  --   --  130* 128*   7/.

## 2021-07-23 ENCOUNTER — APPOINTMENT (OUTPATIENT)
Dept: SPEECH THERAPY | Facility: CLINIC | Age: 71
DRG: 207 | End: 2021-07-23
Attending: HOSPITALIST
Payer: COMMERCIAL

## 2021-07-23 ENCOUNTER — APPOINTMENT (OUTPATIENT)
Dept: PHYSICAL THERAPY | Facility: CLINIC | Age: 71
DRG: 207 | End: 2021-07-23
Attending: HOSPITALIST
Payer: COMMERCIAL

## 2021-07-23 ENCOUNTER — APPOINTMENT (OUTPATIENT)
Dept: OCCUPATIONAL THERAPY | Facility: CLINIC | Age: 71
DRG: 207 | End: 2021-07-23
Attending: HOSPITALIST
Payer: COMMERCIAL

## 2021-07-23 LAB
ANION GAP SERPL CALCULATED.3IONS-SCNC: 10 MMOL/L (ref 5–18)
BUN SERPL-MCNC: 21 MG/DL (ref 8–22)
CALCIUM SERPL-MCNC: 10.9 MG/DL (ref 8.5–10.5)
CHLORIDE BLD-SCNC: 101 MMOL/L (ref 98–107)
CO2 SERPL-SCNC: 30 MMOL/L (ref 22–31)
CREAT SERPL-MCNC: 0.63 MG/DL (ref 0.7–1.3)
GFR SERPL CREATININE-BSD FRML MDRD: >90 ML/MIN/1.73M2
GLUCOSE BLD-MCNC: 101 MG/DL (ref 70–125)
HOLD SPECIMEN: NORMAL
POTASSIUM BLD-SCNC: 4.3 MMOL/L (ref 3.5–5)
SODIUM SERPL-SCNC: 141 MMOL/L (ref 136–145)

## 2021-07-23 PROCEDURE — 99368 TEAM CONF W/O PAT BY HC PRO: CPT | Performed by: OCCUPATIONAL THERAPIST

## 2021-07-23 PROCEDURE — 250N000013 HC RX MED GY IP 250 OP 250 PS 637

## 2021-07-23 PROCEDURE — 250N000013 HC RX MED GY IP 250 OP 250 PS 637: Performed by: HOSPITALIST

## 2021-07-23 PROCEDURE — 83970 ASSAY OF PARATHORMONE: CPT | Performed by: INTERNAL MEDICINE

## 2021-07-23 PROCEDURE — 92507 TX SP LANG VOICE COMM INDIV: CPT | Mod: GN

## 2021-07-23 PROCEDURE — 250N000011 HC RX IP 250 OP 636

## 2021-07-23 PROCEDURE — 250N000013 HC RX MED GY IP 250 OP 250 PS 637: Performed by: INTERNAL MEDICINE

## 2021-07-23 PROCEDURE — 999N000009 HC STATISTIC AIRWAY CARE

## 2021-07-23 PROCEDURE — 99368 TEAM CONF W/O PAT BY HC PRO: CPT

## 2021-07-23 PROCEDURE — 99207 PR CDG-CODE CATEGORY CHANGED: CPT | Performed by: INTERNAL MEDICINE

## 2021-07-23 PROCEDURE — 999N000157 HC STATISTIC RCP TIME EA 10 MIN

## 2021-07-23 PROCEDURE — 250N000009 HC RX 250: Performed by: INTERNAL MEDICINE

## 2021-07-23 PROCEDURE — 94640 AIRWAY INHALATION TREATMENT: CPT

## 2021-07-23 PROCEDURE — 97530 THERAPEUTIC ACTIVITIES: CPT | Mod: GP | Performed by: PHYSICAL THERAPIST

## 2021-07-23 PROCEDURE — 94003 VENT MGMT INPAT SUBQ DAY: CPT | Performed by: INTERNAL MEDICINE

## 2021-07-23 PROCEDURE — 99233 SBSQ HOSP IP/OBS HIGH 50: CPT | Performed by: NURSE PRACTITIONER

## 2021-07-23 PROCEDURE — 97110 THERAPEUTIC EXERCISES: CPT | Mod: GP | Performed by: PHYSICAL THERAPIST

## 2021-07-23 PROCEDURE — 97535 SELF CARE MNGMENT TRAINING: CPT | Mod: GO | Performed by: OCCUPATIONAL THERAPIST

## 2021-07-23 PROCEDURE — 97112 NEUROMUSCULAR REEDUCATION: CPT | Mod: GP | Performed by: PHYSICAL THERAPIST

## 2021-07-23 PROCEDURE — 99368 TEAM CONF W/O PAT BY HC PRO: CPT | Performed by: PHYSICAL THERAPIST

## 2021-07-23 PROCEDURE — 99233 SBSQ HOSP IP/OBS HIGH 50: CPT | Performed by: INTERNAL MEDICINE

## 2021-07-23 PROCEDURE — 120N000017 HC R&B RESPIRATORY CARE

## 2021-07-23 PROCEDURE — 99356 PR PROLONGED SERV,INPATIENT,1ST HR: CPT | Performed by: INTERNAL MEDICINE

## 2021-07-23 PROCEDURE — 97533 SENSORY INTEGRATION: CPT | Mod: GO | Performed by: OCCUPATIONAL THERAPIST

## 2021-07-23 RX ORDER — GUAR GUM
2 PACKET (EA) ORAL 3 TIMES DAILY
Status: DISCONTINUED | OUTPATIENT
Start: 2021-07-23 | End: 2021-07-23

## 2021-07-23 RX ORDER — GUAR GUM
3 PACKET (EA) ORAL 3 TIMES DAILY
Status: DISCONTINUED | OUTPATIENT
Start: 2021-07-23 | End: 2021-08-13

## 2021-07-23 RX ADMIN — LOPERAMIDE HCL 1 MG: 1 SOLUTION ORAL at 17:52

## 2021-07-23 RX ADMIN — Medication 2 PACKET: at 13:06

## 2021-07-23 RX ADMIN — FAMOTIDINE 20 MG: 20 TABLET, FILM COATED ORAL at 20:24

## 2021-07-23 RX ADMIN — VENLAFAXINE 37.5 MG: 37.5 TABLET ORAL at 08:58

## 2021-07-23 RX ADMIN — Medication 1 PACKET: at 09:04

## 2021-07-23 RX ADMIN — FAMOTIDINE 20 MG: 20 TABLET, FILM COATED ORAL at 08:58

## 2021-07-23 RX ADMIN — VENLAFAXINE 37.5 MG: 37.5 TABLET ORAL at 17:52

## 2021-07-23 RX ADMIN — NYSTATIN 500000 UNITS: 100000 SUSPENSION ORAL at 20:26

## 2021-07-23 RX ADMIN — ANORECTAL OINTMENT: 15.7; .44; 24; 20.6 OINTMENT TOPICAL at 09:00

## 2021-07-23 RX ADMIN — HEPARIN SODIUM 5000 UNITS: 5000 INJECTION, SOLUTION INTRAVENOUS; SUBCUTANEOUS at 05:46

## 2021-07-23 RX ADMIN — NYSTATIN 500000 UNITS: 100000 SUSPENSION ORAL at 13:06

## 2021-07-23 RX ADMIN — HEPARIN SODIUM 5000 UNITS: 5000 INJECTION, SOLUTION INTRAVENOUS; SUBCUTANEOUS at 13:06

## 2021-07-23 RX ADMIN — IPRATROPIUM BROMIDE AND ALBUTEROL SULFATE 3 ML: .5; 3 SOLUTION RESPIRATORY (INHALATION) at 14:14

## 2021-07-23 RX ADMIN — IPRATROPIUM BROMIDE AND ALBUTEROL SULFATE 3 ML: .5; 3 SOLUTION RESPIRATORY (INHALATION) at 19:58

## 2021-07-23 RX ADMIN — NYSTATIN 500000 UNITS: 100000 SUSPENSION ORAL at 08:58

## 2021-07-23 RX ADMIN — NYSTATIN 500000 UNITS: 100000 SUSPENSION ORAL at 17:52

## 2021-07-23 RX ADMIN — ANORECTAL OINTMENT: 15.7; .44; 24; 20.6 OINTMENT TOPICAL at 13:20

## 2021-07-23 RX ADMIN — VENLAFAXINE 37.5 MG: 37.5 TABLET ORAL at 12:57

## 2021-07-23 RX ADMIN — IPRATROPIUM BROMIDE AND ALBUTEROL SULFATE 3 ML: .5; 3 SOLUTION RESPIRATORY (INHALATION) at 07:47

## 2021-07-23 RX ADMIN — METHYLPHENIDATE HYDROCHLORIDE 10 MG: 10 TABLET ORAL at 05:46

## 2021-07-23 RX ADMIN — MIRTAZAPINE 7.5 MG: 7.5 TABLET ORAL at 20:24

## 2021-07-23 RX ADMIN — SCOPALAMINE 1 PATCH: 1 PATCH, EXTENDED RELEASE TRANSDERMAL at 13:05

## 2021-07-23 RX ADMIN — Medication 3 PACKET: at 20:27

## 2021-07-23 RX ADMIN — LISINOPRIL 40 MG: 20 TABLET ORAL at 20:26

## 2021-07-23 RX ADMIN — ANORECTAL OINTMENT: 15.7; .44; 24; 20.6 OINTMENT TOPICAL at 20:27

## 2021-07-23 RX ADMIN — HEPARIN SODIUM 5000 UNITS: 5000 INJECTION, SOLUTION INTRAVENOUS; SUBCUTANEOUS at 22:02

## 2021-07-23 RX ADMIN — IPRATROPIUM BROMIDE AND ALBUTEROL SULFATE 3 ML: .5; 3 SOLUTION RESPIRATORY (INHALATION) at 01:55

## 2021-07-23 ASSESSMENT — MIFFLIN-ST. JEOR: SCORE: 1433.26

## 2021-07-23 NOTE — PROGRESS NOTES
[facility]    Medicine Progress Note - Hospitalist Service       Date of Admission:  6/11/2021    Identifier:  69 yo M with h/o HTN who presented to ED on 5/15/2021 with acute SAH after a fall with unresponsiveness.  He underwent emergent left external ventricular drain placement.  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  On 5/28/2021, patient had persistent cerebral vasospasm, CT head showed bilateral BAYLEE infarct and was treated with milrinone and verapamil.  On 6/1/2021, angiogram showed no evidence of vasospasm.  Extubated on 6/3/2021.  6/6/2021 he was reintubated due to hypoxia.  On 6/7/2021, patient underwent tracheostomy and PEG tube placement.  EVD removed 6/9/2021.    Unasyn for possible pneumonia on 5/20/2021 which was switched to ceftriaxone the next day for sputum culture growing Klebsiella.  On 5/23, sputum culture also grew Pseudomonas so switched to Zosyn.  Due to increased WBCs in CSF possibility of ventriculitis was raised however CSF culture was negative but antibiotic was switched to cefepime and vancomycin on 5/28/2021 for 2 weeks.  Vanco was discontinued on 6/9/2021.  On 6/5/2021, sputum grew ESBL so cefepime was switched to meropenem.  He was transferred to [facility] on 6/11/21. Repeat sputum culture 6/19 showed MDR pseudomonas and he was started on tobramycin nebs from 6/24/2021 - 7/9/2021 . He has persistent tracheal secretions.  6/14/21 CT head done for fatigue- read as slight increase in caliber of lateral and third ventricle with possibility of developing communicating hydrocephalus. [facility] who did not think there was much change, so repeat CT head on 6/16/21 was done which did not show any change.     RT and pulmonary are following and pt is on phase 2 weaning with TM/PMV during day and full vent at night. Vent weaning slowed due to poor cough and  secretions.Mental status is slowly improving. He is tolerating tube feeding.     7/8/2021 aspirated tube feeds.  Spiked a fever that evening 102.8 and WBC went up to 36.9 the next morning so ID reconsulted and was started on ceftazadime-avibactam along with flagyl 7/9/2021 - 7/18/21.  In the last 1 month he has not been able to be liberated from the ventilator.  He has had repeated bouts of Klebsiella infection in sputum and ventilator associated pneumonia.  Additionally he has had a large amount of tracheal secretions, significant cognitive impairment, left-sided neglect and ongoing episodes of agitation.  Patient's family has been monitoring the patient continuously during the daytime hours via video monitor and visit with him in person over the weekends.  Multiple specialties have been involved in the care of this patient including palliative care, infectious disease pulmonary medicine and neurology amongst others.    We held a long family care conference today.  Total time 75 minutes in addition to 35 minutes for daily visit with coordination of care with pulmonary and family and physical and occupational therapy and speech therapy and social work greater than 50% of that 35 minutes.  We discussed the current plans and [facility]  We discussed expectations going forward and the frustrations with not meeting what would be the normal expectations in this situation and instead trying to focus on more realistic expectations on a day-to-day basis.  We discussed that he will need long-term help with daily cares despite all of this but it is just unclear how much help he will need.    Assessment & Plan           1/.  Acute hypoxemic respiratory failure with prolonged hospitalization after initial significant intracranial hemorrhage.  One of the biggest barriers to decannulation is related to the issue of excessive secretions.  Glycopyrrolate was tried and stopped.  Also diuresed him  with Lasix for 3 doses with some improvement however that improvement seems to have stabilized or plateaued.  Scopolamine started 7/20/2021  Speaking valve is being attempted in short trials but does aspirate with blue dye test so needs to be only with speech/RT present.  2/.  Insomnia: was on Ritalin which was decreased due to insomnia.    Started with nighttime mirtazapine 7.5 mg scheduled.  Can be uptitrated as appropriate.   3/.  Traumatic brain injury/intracranial hemorrhage: Effexor was started about a week ago at 25 mg 3 times a day and was uptitrated 7/20/21 to 37.53 times a day.  Will need follow up with Dr. Martinez with Neurosurgery in 3 months with repeat CTA of head and neck for post-op f/u.  4/.  Dysphagia: Secondary to respiratory failure continue with tube feeds at this time.  Patient failed the blue dye test with immediate aspiration, continue to use tube feeds  5/.  Ventilator associated pneumonia appreciate intervention and recommendations by infectious disease antibiotics finished on July 18.  6/.  Prognosis and logistics: This is challenge as the patient is unlikely going to be his prehospitalization self.  7/.  Care progression conference scheduled for 2 PM on this Friday for a family care conference.  8/.  Restraints: We have been using mittens and that seems to be eliminating the need for a sitter.  9/. 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   Continue pulmonary toilet, scheduled duonebs  10/. Acute metabolic encephalopathy - trial of Ritalin did not seem to help.  Slow to improve  11/. Anemia - likely ACD  12/. Hypertension - on lisinopril  13/. Sebaceous cyst left inguinal area along with left inguinal hernia - needs outpatient surgical f/u  14/. Severe debility, weakness, critical illness, deconditioning, Continue PT/OT     Diet: Adult Formula Drip Feeding: Continuous Osmolite 1.5;  Gastrostomy; Goal Rate: 90; mL/hr; Medication - Feeding Tube Flush Frequency: 240 mL q 4 hours water flushes; Amount to Send (Nutrition use): send 9 packets Nutrisource Fiber daily; Tube Feeding...    DVT Prophylaxis: Heparin SQ  Gutierrez Catheter: Not present  Central Lines: None  Code Status: Full Code      Disposition Plan   Expected discharge: Unclear at this time recommended to transitional care unit once Liberation from the ventilator.     The patient's care was discussed with the Bedside Nurse, Care Coordinator/ and Patient's Family.    Tomas Cormier MD  Hospitalist Service  [facility]      ______________________________________________________________________      Data reviewed today: I reviewed all medications, new labs and imaging results over the last 24 hours.     Physical Exam   Vital Signs: Temp: 98.5  F (36.9  C) Temp src: Oral BP: 120/82 Pulse: 86   Resp: 20 SpO2: 95 % O2 Device: Trach dome Oxygen Delivery: 30 LPM  Weight: 141 lbs 6.4 oz  General Appearance: Awake sitting in bed, left-sided neglect but responds to voice, said hello  Respiratory: Coarse breath sounds tracheostomy in place  Cardiovascular: S1-S2 regular rate and rhythm  GI: Soft nondistended bowel sounds are present feeding tube noted  Skin: No rashes or lesions.    Other: No lower extremity swelling  Patient is moving both upper extremities without any difficulty and constantly fidgeting  Neurological exam: moves left foot on command.  Will stick out tongue an show teeth when asked.    Data   Recent Labs   Lab 07/23/21  0627 07/22/21  0700 07/21/21  0703 07/20/21  1449 07/20/21  0650 07/19/21  0615   WBC  --  8.5  --  12.0* 13.7* 9.7   HGB  --  10.3*  --   --  10.3* 10.0*   MCV  --  97  --   --  96 97   PLT  --  499*  --   --  462* 413    140 140  --   --  141   POTASSIUM 4.3 4.4 4.7  --   --  4.5   CHLORIDE 101 100 100  --   --  102   CO2 30 30 31  --   --  28   BUN 21 24* 23*  --   --  18   CR 0.63* 0.63* 0.61*   --   --  0.53*   ANIONGAP 10 10 9  --   --  11   RAGINI 10.9* 10.6* 10.6*  --   --  10.3    120 119  --   --  128*   7/.

## 2021-07-23 NOTE — PROGRESS NOTES
CLINICAL NUTRITION SERVICES - REASSESSMENT NOTE     Nutrition Prescription    RECOMMENDATIONS FOR MDs/PROVIDERS TO ORDER:  Check for C. Diff if signs of infection occur and stooling does not improve with PRN imodium and increased fiber provisions.    Malnutrition Status:    Patient does not meet two of the above criteria necessary for diagnosing malnutrition    Recommendations already ordered by Registered Dietitian (RD):  TF: Osmolite 1.5 at 90 mL/hour with 3 packets Benefiber BID  FWF of 240 mL q 4 hours    Future/Additional Recommendations:  None     Diet:  Diet: NPO  Enteral: TF with Osmolite 1.5 at 90 ml/hour and FWF of 240 mL q 4 hours and 3 packets Benefiber BID. This provides 2160 mL formula, 3336 kcals, 136 g protein, 465 g CHO, 106 g fat, 185 g fiber, 1645 mL free fluid from formula + 1440 ml from jotnssi=7843 ml/d = 48 ml/kg/d.    NEW FINDINGS   Pt has had large, watery BMs since 7/20. Per RN it does not have the odor of C. Diff and pt does not have a fever/other signs of infection.    Anthropometrics:  Current wt: 141 lb  Admit wt: 149 lb  His weight has been 138-146 lb in the last week, with weights in the low 140s appearing the most accurate.    Medications:  Pepcid, ritalin, remeron, PRN imodium added today    Malnutrition:   % Weight Loss:  None noted (gradual decrease of 3% in ~ 1 month noted)  % Intake:  No decreased intake noted- on enteral at goal rate  Subcutaneous Fat Loss:  Orbital region-mild  Muscle Loss: Temporal region-moderate and clavicle-moderate  Fluid Retention:  None noted     Malnutrition Diagnosis:   Patient does not meet two of the above criteria necessary for diagnosing malnutrition    INTERVENTIONS  Implementation  RD will order additional three packets Benefiber to help with stooling. If additional fiber and imodium don't improve watery stools, RD will change TF formula on f/u.  This addition to the current regimen will provide 2160 mL formula, 3384 kcals, 136 g protein, 477 g  CHO, 106 g fat, 27 g fiber, 1645 mL free fluid from formula + 1440 ml from vslaxpv=9100 ml/d = 48 ml/kg/d.    Monitoring/Evaluation  Progress toward goals will be monitored and evaluated per protocol.

## 2021-07-23 NOTE — PLAN OF CARE
Problem: Communication Impairment (Artificial Airway)  Goal: Effective Communication  Outcome: Improving     Problem: Device-Related Complication Risk (Artificial Airway)  Goal: Optimal Device Function  Outcome: Improving     Problem: Skin and Tissue Injury (Artificial Airway)  Goal: Absence of Device-Related Skin or Tissue Injury  Outcome: Improving   RT PROGRESS NOTE     DATA:     CURRENT SETTINGS:               TRACH TYPE / SIZE: #7 BIVONA TTS Changed on 7/5/21             MODE:  HFTM/cuff up             FIO2:  30%@35 L/MIN     ACTION:             THERAPIES: Duoneb q6hr                SUCTION:                           FREQUENCY: X7                        AMOUNT: moderate to copious                         CONSISTENCY: thick                        COLOR:  white             SPONTANEOUS COUGH EFFORT/STRENGTH OF EFFORT (not elicited by suctioning): Fair                               WEANING PHASE: 2                        WEAN MODE: 30% @35 L/MIN                        WEAN TIME:  CONT.                        END WEAN REASON:       RESPONSE:             BS:  Decreased ,Faint coarse              VITAL SIGNS: sat 94-96%, HR 84-88, RR 20-22             EMOTIONAL NEEDS / CONCERNS:confused               RISK FOR SELF DECANNULATION: yes                        RISK DUE TO:  Confused                         INTERVENTION/S IN PLACE IS/ARE: Bilateral mittens       NOTE /plan-   Cont current care plan

## 2021-07-23 NOTE — PLAN OF CARE
Problem: Communication Impairment (Artificial Airway)  Goal: Effective Communication  Outcome: Improving     Problem: Device-Related Complication Risk (Artificial Airway)  Goal: Optimal Device Function  7/23/2021 0016 by Kayla Small RT  Outcome: Improving  7/23/2021 0016 by Kayla Smlal RT  Outcome: Improving  Intervention: Optimize Device Care and Function  Recent Flowsheet Documentation  Taken 7/22/2021 2304 by Kayla Small RT  Airway Safety Measures:   all equipment/monitors on and audible   manual resuscitator/mask/valve in room   suction at bedside   suction regulator   suction equipment   oxygen flowmeter  Taken 7/22/2021 1902 by Kayla Small RT  Airway Safety Measures:   all equipment/monitors on and audible   manual resuscitator/mask/valve in room   suction at bedside   suction regulator   suction equipment   oxygen flowmeter     Problem: Skin and Tissue Injury (Artificial Airway)  Goal: Absence of Device-Related Skin or Tissue Injury  Outcome: Improving     Problem: Device-Related Complication Risk (Mechanical Ventilation, Invasive)  Goal: Optimal Device Function  Intervention: Optimize Device Care and Function  Recent Flowsheet Documentation  Taken 7/22/2021 2304 by Kayla Small RT  Airway Safety Measures:   all equipment/monitors on and audible   manual resuscitator/mask/valve in room   suction at bedside   suction regulator   suction equipment   oxygen flowmeter  Taken 7/22/2021 1902 by Kayla Small, RT  Airway Safety Measures:   all equipment/monitors on and audible   manual resuscitator/mask/valve in room   suction at bedside   suction regulator   suction equipment   oxygen flowmeter

## 2021-07-23 NOTE — PROGRESS NOTES
Pulmonary Medicine Follow up Note - Ventilator Rounds:    Clinical status discussed today with RN and respiratory therapist..    Chief complaint: Ongoing respiratory failure  Significant change in clinical status during past 24 hours? -  Tolerating trach mask 24 hours moderate trach secretions       FiO2 (%): 25 %  Resp: 22    Tracheal secretions: large white/ yellow secretions   Current phase of ventilator weaning pathway:  Phase 2  Ventilator weaning results from yesterday: 24 hours trach mask    Current Facility-Administered Medications   Medication     acetaminophen (TYLENOL) solution 650 mg    Or     acetaminophen (TYLENOL) tablet 650 mg     alteplase (CATHFLO ACTIVASE) injection 2 mg     bisacodyl (DULCOLAX) Suppository 10 mg     dextrose 50 % injection 25-50 mL    Or     glucagon injection 1 mg     docusate (COLACE) 50 MG/5ML liquid 100 mg    Or     docusate sodium (COLACE) capsule 100 mg     famotidine (PEPCID) tablet 20 mg     fiber modular (NUTRISOURCE FIBER) packet 2 packet     heparin ANTICOAGULANT injection 5,000 Units     hydrALAZINE (APRESOLINE) injection 10 mg     ipratropium - albuterol 0.5 mg/2.5 mg/3 mL (DUONEB) neb solution 3 mL     lisinopril (ZESTRIL) tablet 40 mg     loperamide (IMODIUM) liquid 1 mg     magnesium hydroxide (MILK OF MAGNESIA) suspension 30 mL     menthol-zinc oxide (CALMOSEPTINE) 0.44-20.6 % ointment OINT     methylphenidate (RITALIN) tablet 10 mg     mirtazapine (REMERON) tablet TABS 7.5 mg     naloxone (NARCAN) injection 0.2 mg    Or     naloxone (NARCAN) injection 0.4 mg    Or     naloxone (NARCAN) injection 0.2 mg    Or     naloxone (NARCAN) injection 0.4 mg     nystatin (MYCOSTATIN) suspension 500,000 Units     ondansetron (ZOFRAN) solution 4 mg     polyethylene glycol (MIRALAX) Packet 17 g     scopolamine (TRANSDERM) 72 hr patch 1 patch    And     scopolamine (TRANSDERM-SCOP) Patch in Place     sodium chloride (PF) 0.9% PF flush 3 mL     sodium chloride (PF) 0.9% PF flush  "3 mL     sodium chloride 0.9% infusion     venlafaxine (EFFEXOR) tablet 37.5 mg       Physical exam     /76 (BP Location: Left arm)   Pulse 84   Temp 98.8  F (37.1  C) (Axillary)   Resp 22   Ht 1.803 m (5' 10.98\")   Wt 64.1 kg (141 lb 6.4 oz)   SpO2 95%   BMI 19.73 kg/m      Gen: awake, poorly interactive, trach on trach mask  HEENT: pale conjunctiva, moist mucosa  Neck: s/p trach   Lungs: discrete ronchi both HT  CV: regular, no murmurs or gallops appreciated  Abdomen: soft, NT, BS wnl  Ext: no edema  Neuro: awake, poor interaction, moves spontaneously upper and lower extremities    Sodium   Date Value Ref Range Status   07/23/2021 141 136 - 145 mmol/L Final   06/11/2021 143 133 - 144 mmol/L Final     Potassium   Date Value Ref Range Status   07/23/2021 4.3 3.5 - 5.0 mmol/L Final   06/11/2021 3.8 3.4 - 5.3 mmol/L Final     Chloride   Date Value Ref Range Status   07/23/2021 101 98 - 107 mmol/L Final   06/11/2021 106 94 - 109 mmol/L Final     Carbon Dioxide   Date Value Ref Range Status   06/11/2021 36 (H) 20 - 32 mmol/L Final     Carbon Dioxide (CO2)   Date Value Ref Range Status   07/23/2021 30 22 - 31 mmol/L Final     Anion Gap   Date Value Ref Range Status   07/23/2021 10 5 - 18 mmol/L Final   06/11/2021 2 (L) 3 - 14 mmol/L Final     Glucose   Date Value Ref Range Status   07/23/2021 101 70 - 125 mg/dL Final   06/11/2021 142 (H) 70 - 99 mg/dL Final     Urea Nitrogen   Date Value Ref Range Status   07/23/2021 21 8 - 22 mg/dL Final   06/11/2021 25 7 - 30 mg/dL Final     Creatinine   Date Value Ref Range Status   07/23/2021 0.63 (L) 0.70 - 1.30 mg/dL Final   06/11/2021 0.57 (L) 0.66 - 1.25 mg/dL Final     GFR Estimate   Date Value Ref Range Status   07/23/2021 >90 >60 mL/min/1.73m2 Final     Comment:     As of July 11, 2021, eGFR is calculated by the CKD-EPI creatinine equation, without race adjustment. eGFR can be influenced by muscle mass, exercise, and diet. The reported eGFR is an estimation only " and is only applicable if the renal function is stable.   07/09/2021 >60 >60 mL/min/1.73m2 Final   06/11/2021 >90 >60 mL/min/[1.73_m2] Final     Comment:     Non  GFR Calc  Starting 12/18/2018, serum creatinine based estimated GFR (eGFR) will be   calculated using the Chronic Kidney Disease Epidemiology Collaboration   (CKD-EPI) equation.       Calcium   Date Value Ref Range Status   07/23/2021 10.9 (H) 8.5 - 10.5 mg/dL Final   06/11/2021 9.6 8.5 - 10.1 mg/dL Final       XR CHEST PORT 1 VIEW  LOCATION: [facility]  DATE/TIME: 7/15/2021 7:50 AM  INDICATION: New VAP followup.  COMPARISON: 07/09/2021.  IMPRESSION:   No change in tracheostomy tube. Heart and mediastinal size are stable. There is indistinctness of the pulmonary interstitium, representing either airway inflammation or edema. There is bandlike opacity involving the retrocardiac region of the left lung base, increased from prior study and representing either atelectasis or infectious process. No pleural effusion or pneumothorax. There is some mild elevation of the right hemidiaphragm.    Diagnosis:     Patient is a 71 yo man with history of hypertension. Patient initially presented on 15-May-2021 to [facility] after being found unresponsive by his wife. Patient was then found to have a subarachnoid hemorrhage secondary to a ruptured aneurysm. Patient was intubated and was started on mannitol, IV antihypertensives and hyperventilation and patient was then transferred to [facility]. Patient underwent aneurysm clipping as well as placement of extra-ventricular drain from hydrocephalus on 15-May-2021. Extra-ventricular drain would malfunction and would require replacement on 04-Jun-2021 and 06-Jun-2021. Extra-ventricular drain reportedly was removed on 09-Jun-2021. Patient had intra-arterial vasospasm treatment with verapamil on 28-May-2021.  Patient was extubated on 16-May-2021 but had to be  reintubated on 19-May-2021. Patient would be extubated on 03-Jun-2021 but would be reintubated on 06-Jun-2021. Patient had tracheostomy and PEG-tube placed on 07-Jun-2021. Patient required treatment for hospital-acquired pneumonia. Patient was transferred to [facility] on 11-Jun-2021.    1. Acute respiratory failure s/p trach 6/7/2021  2. S/p treatment pseudomonas ESBL and klebsiella pneumonia   3. Encephalopathy   4. Subarachnoid bleed s/p craniotomy and clipping P-comm rupture aneurysm.   5. HTN  6. Malnourishment   7. Dysphagia s/p G tube    Recommendations/Plans (MDM):  1. Weaning orders: continue phase 2 weaning trial   2. Trach change? no  3. Diagnostic testing? no  4. Continue pulmonary toiletting, scheduled ipratopium/albuterol nebs  5. Recently treated for tracheobronchitis, in view of ongoing trach secretions, scopolamine patch was added   6. DVT prophylaxis heparin SQ    Marcelino Navarro  Pulmonary / Critical Care  7/23/2021  1:52 PM

## 2021-07-23 NOTE — PROGRESS NOTES
PALLIATIVE CARE PROGRESS NOTE     Patient Name: Dayron Neri  Date of Admission: 6/11/2021   Today the patient was seen for: followup     Impressions & Recommendations:  Goals of care per [facility]  Wife wants him to  Regain his speech, have less secretions, have trach out eventually    Code Status: Full Code, confirmed again on 7/8  Palliative Care will attend the Care progression conference  with patient's daughter Yolanda and patient's wife Susy .  They have agreed to 2 PM on this Friday for a family care conference.      Symptom management  # Encephalopathy:   Comment: in setting of SAH s/p craniotomy and clipping P-comm ruptured aneurysm, and b/l infarct.  EEG neg for seizures.  Repeat CT 7/1 with no acute changes.  Slow progression, alertness wax/wanes  Recommendations:   - Methylphenidate started 7/7  : This has greatly helped his alertness  - consider switching nebs to albuterol only to avoid anticholinergics in setting of encephalopathy   - Avoid benzodiazepines, antihistamines, anticholinergics if able  - Lights on and blinds open during the day.  Reorient frequently  - Lights & TV off during the night.  Promote normal circadian rhythm  - Limit sensory deprivation - utilize hearing aids, glasses, etc  - Frequently assess unmet bathroom needs if applicable.          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  Comment:  patient was diuresed yesterday   in an attempt to decrease the secretions and it seems to help  Speaking valve is being attempted in short trials, the concern is that aspiration is noted via a blue dye test when  cuff is down  Recommend  Approach from Right side    #Insomnia  Comment: worsened by  Ritalin at 5 pm  Recommendation  Agree with discontinue PM ritalin and now on Effexor    Psychosocial/spiritual support    En HARTMANN following    Wife, daughter are supportive    [facility]     Advanced Care Planning    Patient has a completed Health Care Directive: no     Health care agent: wife Susy      ----------------------------------------------------------------------------------------------------------------  Summary:      Dayron Neri is a 69 yo man with history of hypertension. Patient initially presented on 15-May-2021 to [facility] after being found unresponsive by his wife. Patient was then found to have a subarachnoid hemorrhage secondary to a ruptured aneurysm. CT head showing bilateral BAYLEE infarct. Patient was intubated and was started on mannitol, IV antihypertensives and hyperventilation and patient was then transferred to [facility]. Patient underwent aneurysm clipping as well as placement of extra-ventricular drain from hydrocephalus on 15-May-2021. Extra-ventricular drain would malfunction and would require replacement on 04-Jun-2021 and 06-Jun-2021. Extra-ventricular drain reportedly was removed on 09-Jun-2021. Patient had intra-arterial vasospasm treatment with verapamil on 28-May-2021.  Patient was extubated on 16-May-2021 but had to be reintubated on 19-May-2021. Patient would be extubated on 03-Jun-2021 but would be reintubated on 06-Jun-2021. Patient had tracheostomy and PEG-tube placed on 07-Jun-2021. Patient required treatment for hospital-acquired pneumonia. Patient was transferred to [facility] on 11-Jun-2021    Interim Hx since admission to LTAC  -He has had repeated bouts of Klebsiella infection in sputum and ventilator associated pneumonia.  Additionally he has had a large amount of tracheal secretions, significant cognitive impairment, left-sided neglect and  "ongoing episodes of agitation.   -He had been liberated from the vent and a  Speaking valve is being attempted in short trials, the concern was  Aspiration, confirmed by blue dye test + for aspiration pneumonia 7/9 , off abx 7/19 . Returned to full vent and is now off the vent 48hours.     .      Interim:      SLP:  Patient remains at high risk for aspiration with cuff deflated.  Writer explained this to patient's wife and daughter, who were present via iPad.  They have been advocating for him to be wearing speaking valve for longer periods throughout the day.  Writer advised brief speaking valve trials (15-20 minutes max) with direct supervision of Speech Therapy or RT only. and daughter verbalized understanding.  Wife continues to be very frustrated with what she deems as treatment team's inability to \"get his secretions under control\" and sees this as a major barrier to patient's progress.          ----------------------------------------------------------------------------------------------------------------  Palliative Overview:    Key Palliative Symptom data:  Pain: denies  Dyspnea: mild, copious secretions  Nausea:none  Anxiety:none    Prognosis, Goals, and/or Advance Care Planning were addressed today: Susy  Said she knows the outcome if he doesn't improve and she is not ready to go there    Mood, coping, and/or meaning in the context of serious illness were addressed today:yes*       Discussion  7/23 care progression meeting held today with Susy, her daughter Yolanda,  Dr. Fabrizio Padgett, PT, OT, SLP, dietitian, and respiratory therapist.  All disciplines gave a very nice presentation of the therapy being offered and patient's progress and participation with therapy.  The OT gave a nice description of a head injury and how therapy should  not overwhelm the patient and be given as the patient tolerates him but not over stimulate him while also allowing rest and recovery to allow for new learning. " " SLP answered several questions that were asked by the daughter, who had been researching on the Internet how to do swallow therapy.  The SLP nicely explained that we do not have a definitive plan for what type of swallow therapy should be initiated  until the patient is more cognitively improved to participate in  a swallow study to give a  definitive diagnosis.  Dr. Dinh reiterated that and explained how complicated swallow is in the brain communication to initiate swallow etc.RT explained that secretions could have been decreased because patient is not on positive pressure at night which help keep the secretions under control.  This  needed to be explained a few times to the patient yes thank you so much for calling back appreciated significant concerns about a placement that is up there and if I can just explain .    Family was thankful for the meeting and sandip did say \"I don't think my  will ever be able to come to independent living\".    She will take it one day at a time    7/20 I met with patient, who was sitting in his chair, and his wife and daughter who were on the iPad.  I asked him how things were going and patient's wife said I am still hoping for a miracle.  I indicated that I understood that there would be a meeting on Friday and she said yes, I do not know how I feel about it but yes I will be there.  I indicated that he is now on Scopolamine and Robinul to help with his secretions and she said \"why was any on that before\".  I explained that those medications can cause mucous plugs so pulmonary usually does not like us to do that when were trying to wean somebody.  However, since his secretions are still copious we will keep them on.  Again she said she did not like the idea that he was not on the speaking valve and I reiterated that with the cuff deflated he is aspirating more and so both either speech or respiratory therapy should be in the room now when he has a speaking valve on.  She " side and said either they are too cautious or not cautious enough.  Lab came in to draw blood and the wife said that we will you checking a WBC, and I indicated that that is ordered for Monday Wednesday and Friday.  She could became upset so I went ahead and ordered it.  I placed music on for him and talked with him all the longer and indicated I will see them again on Friday      7/15 I met with patient, who was sitting in his chair, along with his wife and daughter who were on the iPad monitor.  My colleague En,  was also present.  I reintroduced myself as I had spoken to uSsy, yesterday and also upon first consultation from palliative care.  Susy and her daughter expressed quite a lot of anger with issues that have occurred at the LT.  We talked a little bit about this yesterday and she did talk to the patient [employer] Representative Gretel at my request.  She states her issues were little things that have added up.  1 thing was that he had mitts on for agitation and they were so tight and small that they were causing him sores on his arms.  She is also was agitated with the speech therapist as she did not feel like they were trying to have him on the speaking valve.  (Part of the reason for this is increased secretions coming from his trach).  She also feels that therapy is not being aggressive enough with him.  ( I chose to listen to her concerns and build a relationship, but I am concerned because he does not have the strength to participate in therapy right now.  Therapy is   passive at this point due to shaheen, her expectations are for therapy to be more aggressive.)  She is frustrated by multiple setbacks including Pseudomonas and aspiration pneumonia.  She indicates that she feels like he is on a holding pattern.  The best day she has seen him was on July 3 when he sat in the chair for 5.5 hours and could converse, was very alert and answering questions appropriately via the speaking  "valve.  She is upset that he aspirated resulting in  pneumonia.  Her  was attentive to this conversation and I recommended that we hold on this anymore for now because he seemed to be becoming discouraged.  She then would talk more reassuring to him after I mentioned that.    ROS  A full 14 point review of systems was otherwise completed and is negative aside from that mentioned above    -----------------------------------------------------------------------------------------------------------------  I have reviewed and supplemented the documentation in this patient's medical record listed below regarding active medical problems, allergies, and medications.     Current Problem List:   Active Problems:    Subarachnoid hemorrhage due to ruptured aneurysm (H)    Cognitive impairment    Acute respiratory failure with hypoxia and hypercapnia (H)    Other dysphagia    Left-sided neglect      Allergies:  No Known Allergies    PERTINENT PHYSICAL EXAMINATION:  Vital Signs: Blood pressure 120/82, pulse 88, temperature 98.5  F (36.9  C), temperature source Oral, resp. rate 20, height 1.803 m (5' 10.98\"), weight 64.1 kg (141 lb 6.4 oz), SpO2 94 %.   GENERAL: Sitting in chair, alert, following commands.  SKIN: Warm and dry   HEENT: Normocephalic, anicteric sclera, moist mucous membranes  LUNGS: Trach in place,secretions copious   CARDIAC: RRR, normal s1/s2, w/o m/r/g   ABDOMINAL: BS (+), soft, non distended, non tender  : fournier in place  MUSKL: no gross joint deformities   EXTREMITIES: No edema or cyanosis, pulses 2+ and symmetrical  NEUROLOGIC: Alert and oriented though fatigued  PSYCH: Appears mildly depressed    All labs/imaging reviewed in Epic     ====================================================  TT: I have personally spent a total of 35 minutes on the unit in review of medical record, consultation with the medical providers and assessment of patient today, with more than 50% of this time spent in counseling, " coordination of care, and discussion with prognosis, symptom management, risks and benefits of management options, and development of plan of care as noted above.  ====================================================      Total Visit Time: 1 hour 35 min  o    o For Inpatient, 'Total Visit Time' = Unit Floor + Face-to-Face time.    Total Face-to-Face Prolonged Service Time:  1 hour    Content of the Prolonged Time: *Family meeting    SISI Romero, NP-C, ACHPN    [facility]  Palliative Medicine  Office: [phone]

## 2021-07-23 NOTE — PROGRESS NOTES
RT PROGRESS NOTE    DATA  TRACH TYPE -- Bivona #7 Cuffed (Placed 6/7; Last Changed 7/5)    ACTION  THERAPIES -- Q6 Duoneb   SUCTION   FREQUENCY -- 5+   AMOUNT -- large   CONSISTENCY -- thin to thick   COLOR -- white   SPONTANEOUS COUGH -- good  WEAN PHASE #2   MODE -- HTD 30 LPM 25%   DURATION -- 24/7   END REASON -- N/A    RESPONSE  BREATH SOUNDS -- Clear to Coarse   VITAL SIGNS --  Temp:  [98.3  F (36.8  C)-98.5  F (36.9  C)] 98.3  F (36.8  C)  Pulse:  [84-92] 92  Resp:  [20] 20  BP: (114-145)/(73-85) 139/85  FiO2 (%):  [25 %] 25 %  SpO2:  [90 %-98 %] 90 %  EMOTIONAL CONCERNS -- No  RISK FOR SELF-DECANNULATION -- Yes; Mitt restraints in place     NOTE   No signs of respiratory distress. RT will continue to monitor.     Kayla Lux, RT

## 2021-07-23 NOTE — PLAN OF CARE
Problem: Adult Inpatient Plan of Care  Goal: Plan of Care Review  Outcome: Improving   Patient slept well last night 7-8 hrs and does not seem to be in any discomfort.     Problem: Restraint for Non-Violent/Non-Self-Destructive Behavior  Goal: Prevent/Manage Potential Problems  Description: Maintain safety of patient and others during period of restraint.  Promote psychological and physical wellbeing.  Prevent injury to skin and involved body parts.  Promote nutrition, hydration, and elimination.  Outcome: Improving   Patient still on mitts x 2 for safety from pulling lines. Will continue to monitor.

## 2021-07-24 ENCOUNTER — APPOINTMENT (OUTPATIENT)
Dept: PHYSICAL THERAPY | Facility: CLINIC | Age: 71
DRG: 207 | End: 2021-07-24
Attending: HOSPITALIST
Payer: COMMERCIAL

## 2021-07-24 PROCEDURE — 94664 DEMO&/EVAL PT USE INHALER: CPT

## 2021-07-24 PROCEDURE — 250N000013 HC RX MED GY IP 250 OP 250 PS 637: Performed by: INTERNAL MEDICINE

## 2021-07-24 PROCEDURE — 99233 SBSQ HOSP IP/OBS HIGH 50: CPT | Performed by: INTERNAL MEDICINE

## 2021-07-24 PROCEDURE — 999N000157 HC STATISTIC RCP TIME EA 10 MIN

## 2021-07-24 PROCEDURE — 250N000011 HC RX IP 250 OP 636

## 2021-07-24 PROCEDURE — 250N000009 HC RX 250: Performed by: INTERNAL MEDICINE

## 2021-07-24 PROCEDURE — 250N000013 HC RX MED GY IP 250 OP 250 PS 637: Performed by: HOSPITALIST

## 2021-07-24 PROCEDURE — 94640 AIRWAY INHALATION TREATMENT: CPT

## 2021-07-24 PROCEDURE — 120N000017 HC R&B RESPIRATORY CARE

## 2021-07-24 PROCEDURE — 999N000009 HC STATISTIC AIRWAY CARE

## 2021-07-24 PROCEDURE — 97110 THERAPEUTIC EXERCISES: CPT | Mod: GP

## 2021-07-24 PROCEDURE — 94640 AIRWAY INHALATION TREATMENT: CPT | Mod: 76

## 2021-07-24 PROCEDURE — 250N000013 HC RX MED GY IP 250 OP 250 PS 637

## 2021-07-24 RX ADMIN — ANORECTAL OINTMENT: 15.7; .44; 24; 20.6 OINTMENT TOPICAL at 13:00

## 2021-07-24 RX ADMIN — ANORECTAL OINTMENT: 15.7; .44; 24; 20.6 OINTMENT TOPICAL at 21:21

## 2021-07-24 RX ADMIN — VENLAFAXINE 37.5 MG: 37.5 TABLET ORAL at 12:59

## 2021-07-24 RX ADMIN — FAMOTIDINE 20 MG: 20 TABLET, FILM COATED ORAL at 21:18

## 2021-07-24 RX ADMIN — Medication 3 PACKET: at 09:00

## 2021-07-24 RX ADMIN — IPRATROPIUM BROMIDE AND ALBUTEROL SULFATE 3 ML: .5; 3 SOLUTION RESPIRATORY (INHALATION) at 01:22

## 2021-07-24 RX ADMIN — HEPARIN SODIUM 5000 UNITS: 5000 INJECTION, SOLUTION INTRAVENOUS; SUBCUTANEOUS at 21:19

## 2021-07-24 RX ADMIN — IPRATROPIUM BROMIDE AND ALBUTEROL SULFATE 3 ML: .5; 3 SOLUTION RESPIRATORY (INHALATION) at 19:20

## 2021-07-24 RX ADMIN — Medication 3 PACKET: at 13:15

## 2021-07-24 RX ADMIN — ANORECTAL OINTMENT: 15.7; .44; 24; 20.6 OINTMENT TOPICAL at 09:04

## 2021-07-24 RX ADMIN — MIRTAZAPINE 7.5 MG: 7.5 TABLET ORAL at 21:19

## 2021-07-24 RX ADMIN — HEPARIN SODIUM 5000 UNITS: 5000 INJECTION, SOLUTION INTRAVENOUS; SUBCUTANEOUS at 13:01

## 2021-07-24 RX ADMIN — METHYLPHENIDATE HYDROCHLORIDE 10 MG: 10 TABLET ORAL at 05:56

## 2021-07-24 RX ADMIN — IPRATROPIUM BROMIDE AND ALBUTEROL SULFATE 3 ML: .5; 3 SOLUTION RESPIRATORY (INHALATION) at 07:20

## 2021-07-24 RX ADMIN — HEPARIN SODIUM 5000 UNITS: 5000 INJECTION, SOLUTION INTRAVENOUS; SUBCUTANEOUS at 05:56

## 2021-07-24 RX ADMIN — FAMOTIDINE 20 MG: 20 TABLET, FILM COATED ORAL at 09:00

## 2021-07-24 RX ADMIN — IPRATROPIUM BROMIDE AND ALBUTEROL SULFATE 3 ML: .5; 3 SOLUTION RESPIRATORY (INHALATION) at 13:49

## 2021-07-24 RX ADMIN — LISINOPRIL 40 MG: 20 TABLET ORAL at 18:14

## 2021-07-24 RX ADMIN — Medication 3 PACKET: at 21:18

## 2021-07-24 RX ADMIN — VENLAFAXINE 37.5 MG: 37.5 TABLET ORAL at 16:23

## 2021-07-24 RX ADMIN — VENLAFAXINE 37.5 MG: 37.5 TABLET ORAL at 08:59

## 2021-07-24 ASSESSMENT — MIFFLIN-ST. JEOR: SCORE: 1450.49

## 2021-07-24 NOTE — PLAN OF CARE
RT PROGRESS NOTE     DATA:     CURRENT SETTINGS:             TRACH TYPE / SIZE:  7 Bivona placed 7-5-21             MODE:   25% @ 30 LPM HFTM with cuff up.                  ACTION:             THERAPIES:   Q6 Duo nebs given.             SUCTION:  Orally sxnd PRN for mod to copious, thin, clear.                         FREQUENCY:   X 7                        AMOUNT:   mod x 4, large x 1, copious x 2                        CONSISTENCY:   thick                        COLOR:   white             SPONTANEOUS COUGH EFFORT/STRENGTH OF EFFORT (not elicited by suctioning): Strong/Productive                              WEANING PHASE:   2. PMV with Speech Therapy, for brief 15-20 minutes. Direct supervision with Speech or RT.     RESPONSE:             BS:   CS-Clear and dim post sxn             VITAL SIGNS:   SATs 94-95%, HR 88-90, RR 20-22             RISK FOR SELF DECANNULATION:  Yes              RISK DUE TO:  Confusion              INTERVENTION/S IN PLACE IS/ARE:  Bilateral mitts       NOTE / PLAN:   Continue with same     Problem: Device-Related Complication Risk (Artificial Airway)  Goal: Optimal Device Function  Outcome: No Change     Problem: Skin and Tissue Injury (Artificial Airway)  Goal: Absence of Device-Related Skin or Tissue Injury  Outcome: No Change

## 2021-07-24 NOTE — PLAN OF CARE
Problem: Restraint for Non-Violent/Non-Self-Destructive Behavior  Goal: Prevent/Manage Potential Problems  Description: Maintain safety of patient and others during period of restraint.  Promote psychological and physical wellbeing.  Prevent injury to skin and involved body parts.  Promote nutrition, hydration, and elimination.  Outcome: No Change     Problem: Device-Related Complication Risk (Artificial Airway)  Goal: Optimal Device Function  Outcome: No Change     Problem: Skin and Tissue Injury (Artificial Airway)  Goal: Absence of Device-Related Skin or Tissue Injury  Outcome: No Change     Problem: Adult Inpatient Plan of Care  Goal: Absence of Hospital-Acquired Illness or Injury  Intervention: Identify and Manage Fall Risk  Recent Flowsheet Documentation  Taken 7/24/2021 0034 by Kristal Jaramillo, RN  Safety Promotion/Fall Prevention:    bed alarm on    lighting adjusted    room door open    room near nurse's station    safety round/check completed         Slept 5-6 hours the whole night, no complaints of pain, monitored for safety on bilateral wrist restraints.

## 2021-07-24 NOTE — PLAN OF CARE
Problem: Adult Inpatient Plan of Care  Goal: Plan of Care Review  Outcome: Improving   Patient remained comfortable and stable all shift.  Hao Blackwood RN

## 2021-07-24 NOTE — PLAN OF CARE
Patient alert sometimes try to respond verbally very slowly. Patient was up on the chair twice and tolerated well for couple hours each time. Patient had loose stool twice. Imodium prn given. Patient was reaching for tubes when the mitt removed. Continued on bilateral mitten. Family visited and participated care progress meeting. Patient resting at this time. Will continue to monitor.

## 2021-07-24 NOTE — PROGRESS NOTES
[facility]    Medicine Progress Note - Hospitalist Service       Date of Admission:  6/11/2021    Identifier:  69 yo M with h/o HTN who presented to ED on 5/15/2021 with acute SAH after a fall with unresponsiveness.  He underwent emergent left external ventricular drain placement.  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  On 5/28/2021, patient had persistent cerebral vasospasm, CT head showed bilateral BAYLEE infarct and was treated with milrinone and verapamil.  On 6/1/2021, angiogram showed no evidence of vasospasm.  Extubated on 6/3/2021.  6/6/2021 he was reintubated due to hypoxia.  On 6/7/2021, patient underwent tracheostomy and PEG tube placement.  EVD removed 6/9/2021.    Unasyn for possible pneumonia on 5/20/2021 which was switched to ceftriaxone the next day for sputum culture growing Klebsiella.  On 5/23, sputum culture also grew Pseudomonas so switched to Zosyn.  Due to increased WBCs in CSF possibility of ventriculitis was raised however CSF culture was negative but antibiotic was switched to cefepime and vancomycin on 5/28/2021 for 2 weeks.  Vanco was discontinued on 6/9/2021.  On 6/5/2021, sputum grew ESBL so cefepime was switched to meropenem.  He was transferred to [facility] on 6/11/21. Repeat sputum culture 6/19 showed MDR pseudomonas and he was started on tobramycin nebs from 6/24/2021 - 7/9/2021 . He has persistent tracheal secretions.  6/14/21 CT head done for fatigue- read as slight increase in caliber of lateral and third ventricle with possibility of developing communicating hydrocephalus. Discussed with Dr. Casillas (neurosurgeon at [facility]), who did not think there was much change, so repeat CT head on 6/16/21 was done which did not show any change.     RT and pulmonary are following and pt is on phase 2 weaning with TM/PMV during day and is off the vent at night the last few days. Vent weaning had been  slowed due to poor cough and secretions.Mental status is slowly improving. He is tolerating tube feeding.     7/8/2021 aspirated tube feeds.  Spiked a fever that evening 102.8 and WBC went up to 36.9 the next morning so ID reconsulted and was started on ceftazadime-avibactam along with flagyl 7/9/2021 - 7/18/21.    He has had repeated bouts of Klebsiella infection in sputum and ventilator associated pneumonia.  Additionally he has had a large amount of tracheal secretions, significant cognitive impairment, left-sided neglect and ongoing episodes of agitation.  Patient's family has been monitoring the patient continuously during the daytime hours via video monitor and visit with him in person over the weekends.  Multiple specialties have been involved in the care of this patient including palliative care, infectious disease pulmonary medicine and neurology amongst others.    Family care progression conference held 7/23/2021.  Expectations discussed.    Assessment & Plan            Acute on chronic hypoxemic respiratory failure with prolonged hospitalization after initial significant intracranial hemorrhage.  One of the biggest barriers to decannulation is related to the issue of excessive secretions.  Glycopyrrolate was tried and stopped.  Also diuresed him with Lasix for 3 doses with some improvement however that improvement seems to have stabilized or plateaued.  Scopolamine started 7/20/2021  Speaking valve is being attempted in short trials but does aspirate with blue dye test so needs to be only with speech/RT present.   Insomnia: was on Ritalin which was decreased due to insomnia.    Started with nighttime mirtazapine 7.5 mg scheduled.  Can be uptitrated as appropriate.    Traumatic brain injury/intracranial hemorrhage: Effexor was started about a week ago at 25 mg 3 times a day and was uptitrated 7/20/21 to 37.53 times a day.  Will need follow up with Dr. Martinez with Neurosurgery in 3 months with repeat CTA of  head and neck for post-op f/u.   Dysphagia: Secondary to respiratory failure continue with tube feeds at this time.  Patient failed the blue dye test 7/20/21 with immediate aspiration, continue to use tube feeds   Ventilator associated pneumonia appreciate intervention and recommendations by infectious disease antibiotics finished on July 18.   Prognosis and logistics: This is challenge as the patient is unlikely going to be his prehospitalization self.   Restraints: We have been using mittens and that seems to be eliminating the need for a sitter.    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   Continue pulmonary toilet, scheduled duonebs   Acute metabolic encephalopathy - trial of Ritalin did not seem to help much - had to back down to just in the morning.  Slow to improve   Anemia - likely ACD   Hypertension - on lisinopril   Sebaceous cyst left inguinal area along with left inguinal hernia - needs outpatient surgical f/u   Severe debility, weakness, critical illness, deconditioning, Continue PT/OT   Hypercalcemia - unclear etiology, mild at this point. Check again tomorrow morning.       Diet: Adult Formula Drip Feeding: Continuous Osmolite 1.5; Gastrostomy; Goal Rate: 90; mL/hr; Medication - Feeding Tube Flush Frequency: 240 mL q 4 hours water flushes; Amount to Send (Nutrition use): send 9 packets Nutrisource Fiber daily; Tube Feeding...    DVT Prophylaxis: Heparin SQ  Gutierrez Catheter: Not present  Central Lines: None  Code Status: Full Code      Disposition Plan   Expected discharge: Unclear at this time recommended to transitional care unit once secretions have decreased some.     The patient's care was discussed with the Bedside Nurse, Care Coordinator/ and Patient's Family.    Tomas Cormier MD  Hospitalist Service  [facility]  [place]      ______________________________________________________________________      Data reviewed today: I reviewed all medications, new labs and imaging results over the last 24 hours.     Physical Exam   Vital Signs: Temp: 98.2  F (36.8  C) Temp src: Oral BP: 126/75 Pulse: 81   Resp: 18 SpO2: 94 % O2 Device: Trach dome Oxygen Delivery: 30 LPM  Weight: 141 lbs 6.4 oz  General Appearance: Awake sitting in bed, left-sided neglect but responds to voice, said hello  Respiratory: Coarse breath sounds tracheostomy in place  Cardiovascular: S1-S2 regular rate and rhythm  GI: Soft nondistended bowel sounds are present feeding tube noted  Skin: No rashes or lesions.    Other: No lower extremity swelling  Patient is moving both upper extremities without any difficulty and constantly fidgeting  Neurological exam: moves left foot on command.  Will stick out tongue an show teeth when asked.    Data   Recent Labs   Lab 07/23/21  0627 07/22/21  0700 07/21/21  0703 07/20/21  1449 07/20/21  0650 07/19/21  0615   WBC  --  8.5  --  12.0* 13.7* 9.7   HGB  --  10.3*  --   --  10.3* 10.0*   MCV  --  97  --   --  96 97   PLT  --  499*  --   --  462* 413    140 140  --   --  141   POTASSIUM 4.3 4.4 4.7  --   --  4.5   CHLORIDE 101 100 100  --   --  102   CO2 30 30 31  --   --  28   BUN 21 24* 23*  --   --  18   CR 0.63* 0.63* 0.61*  --   --  0.53*   ANIONGAP 10 10 9  --   --  11   RAGINI 10.9* 10.6* 10.6*  --   --  10.3    120 119  --   --  128*

## 2021-07-25 LAB
BASOPHILS # BLD AUTO: 0.1 10E3/UL (ref 0–0.2)
BASOPHILS NFR BLD AUTO: 1 %
CALCIUM SERPL-MCNC: 10.8 MG/DL (ref 8.5–10.5)
CALCIUM, IONIZED MEASURED: 1.43 MMOL/L (ref 1.11–1.3)
EOSINOPHIL # BLD AUTO: 0.3 10E3/UL (ref 0–0.7)
EOSINOPHIL NFR BLD AUTO: 3 %
ERYTHROCYTE [DISTWIDTH] IN BLOOD BY AUTOMATED COUNT: 14.6 % (ref 10–15)
HCT VFR BLD AUTO: 32.4 % (ref 40–53)
HGB BLD-MCNC: 10.5 G/DL (ref 13.3–17.7)
IMM GRANULOCYTES # BLD: 0.1 10E3/UL
IMM GRANULOCYTES NFR BLD: 1 %
ION CA PH 7.4: 1.44 MMOL/L (ref 1.11–1.3)
LYMPHOCYTES # BLD AUTO: 1.3 10E3/UL (ref 0.8–5.3)
LYMPHOCYTES NFR BLD AUTO: 12 %
MCH RBC QN AUTO: 31.7 PG (ref 26.5–33)
MCHC RBC AUTO-ENTMCNC: 32.4 G/DL (ref 31.5–36.5)
MCV RBC AUTO: 98 FL (ref 78–100)
MONOCYTES # BLD AUTO: 0.8 10E3/UL (ref 0–1.3)
MONOCYTES NFR BLD AUTO: 8 %
NEUTROPHILS # BLD AUTO: 8 10E3/UL (ref 1.6–8.3)
NEUTROPHILS NFR BLD AUTO: 75 %
NRBC # BLD AUTO: 0 10E3/UL
NRBC BLD AUTO-RTO: 0 /100
PH: 7.41 (ref 7.35–7.45)
PLATELET # BLD AUTO: 489 10E3/UL (ref 150–450)
RBC # BLD AUTO: 3.31 10E6/UL (ref 4.4–5.9)
WBC # BLD AUTO: 10.4 10E3/UL (ref 4–11)

## 2021-07-25 PROCEDURE — 999N000157 HC STATISTIC RCP TIME EA 10 MIN

## 2021-07-25 PROCEDURE — 999N000123 HC STATISTIC OXYGEN O2DAILY TECH TIME

## 2021-07-25 PROCEDURE — 250N000009 HC RX 250: Performed by: INTERNAL MEDICINE

## 2021-07-25 PROCEDURE — 99233 SBSQ HOSP IP/OBS HIGH 50: CPT | Performed by: INTERNAL MEDICINE

## 2021-07-25 PROCEDURE — 999N000009 HC STATISTIC AIRWAY CARE

## 2021-07-25 PROCEDURE — 82330 ASSAY OF CALCIUM: CPT | Performed by: INTERNAL MEDICINE

## 2021-07-25 PROCEDURE — 250N000013 HC RX MED GY IP 250 OP 250 PS 637: Performed by: HOSPITALIST

## 2021-07-25 PROCEDURE — 94640 AIRWAY INHALATION TREATMENT: CPT | Mod: 76

## 2021-07-25 PROCEDURE — 36415 COLL VENOUS BLD VENIPUNCTURE: CPT | Performed by: INTERNAL MEDICINE

## 2021-07-25 PROCEDURE — 85025 COMPLETE CBC W/AUTO DIFF WBC: CPT | Performed by: INTERNAL MEDICINE

## 2021-07-25 PROCEDURE — 120N000017 HC R&B RESPIRATORY CARE

## 2021-07-25 PROCEDURE — 94640 AIRWAY INHALATION TREATMENT: CPT

## 2021-07-25 PROCEDURE — 250N000013 HC RX MED GY IP 250 OP 250 PS 637: Performed by: INTERNAL MEDICINE

## 2021-07-25 PROCEDURE — 82310 ASSAY OF CALCIUM: CPT | Performed by: INTERNAL MEDICINE

## 2021-07-25 PROCEDURE — 250N000013 HC RX MED GY IP 250 OP 250 PS 637

## 2021-07-25 PROCEDURE — 250N000011 HC RX IP 250 OP 636

## 2021-07-25 RX ADMIN — Medication 3 PACKET: at 14:10

## 2021-07-25 RX ADMIN — VENLAFAXINE 37.5 MG: 37.5 TABLET ORAL at 16:31

## 2021-07-25 RX ADMIN — ANORECTAL OINTMENT: 15.7; .44; 24; 20.6 OINTMENT TOPICAL at 08:13

## 2021-07-25 RX ADMIN — FAMOTIDINE 20 MG: 20 TABLET, FILM COATED ORAL at 21:41

## 2021-07-25 RX ADMIN — FAMOTIDINE 20 MG: 20 TABLET, FILM COATED ORAL at 08:05

## 2021-07-25 RX ADMIN — IPRATROPIUM BROMIDE AND ALBUTEROL SULFATE 3 ML: .5; 3 SOLUTION RESPIRATORY (INHALATION) at 02:07

## 2021-07-25 RX ADMIN — HEPARIN SODIUM 5000 UNITS: 5000 INJECTION, SOLUTION INTRAVENOUS; SUBCUTANEOUS at 05:39

## 2021-07-25 RX ADMIN — IPRATROPIUM BROMIDE AND ALBUTEROL SULFATE 3 ML: .5; 3 SOLUTION RESPIRATORY (INHALATION) at 19:09

## 2021-07-25 RX ADMIN — VENLAFAXINE 37.5 MG: 37.5 TABLET ORAL at 12:25

## 2021-07-25 RX ADMIN — MIRTAZAPINE 7.5 MG: 7.5 TABLET ORAL at 21:29

## 2021-07-25 RX ADMIN — HEPARIN SODIUM 5000 UNITS: 5000 INJECTION, SOLUTION INTRAVENOUS; SUBCUTANEOUS at 14:10

## 2021-07-25 RX ADMIN — IPRATROPIUM BROMIDE AND ALBUTEROL SULFATE 3 ML: .5; 3 SOLUTION RESPIRATORY (INHALATION) at 07:09

## 2021-07-25 RX ADMIN — METHYLPHENIDATE HYDROCHLORIDE 10 MG: 10 TABLET ORAL at 05:38

## 2021-07-25 RX ADMIN — Medication 3 PACKET: at 21:29

## 2021-07-25 RX ADMIN — IPRATROPIUM BROMIDE AND ALBUTEROL SULFATE 3 ML: .5; 3 SOLUTION RESPIRATORY (INHALATION) at 14:05

## 2021-07-25 RX ADMIN — Medication 3 PACKET: at 08:05

## 2021-07-25 RX ADMIN — HEPARIN SODIUM 5000 UNITS: 5000 INJECTION, SOLUTION INTRAVENOUS; SUBCUTANEOUS at 21:29

## 2021-07-25 RX ADMIN — LISINOPRIL 40 MG: 20 TABLET ORAL at 21:37

## 2021-07-25 RX ADMIN — VENLAFAXINE 37.5 MG: 37.5 TABLET ORAL at 08:05

## 2021-07-25 RX ADMIN — ANORECTAL OINTMENT: 15.7; .44; 24; 20.6 OINTMENT TOPICAL at 14:10

## 2021-07-25 ASSESSMENT — MIFFLIN-ST. JEOR: SCORE: 1451.4

## 2021-07-25 NOTE — PLAN OF CARE
Problem: Device-Related Complication Risk (Artificial Airway)  Goal: Optimal Device Function  Outcome: No Change     Problem: Skin and Tissue Injury (Artificial Airway)  Goal: Absence of Device-Related Skin or Tissue Injury  Outcome: No Change     Problem: Communication Impairment (Artificial Airway)  Goal: Effective Communication  Outcome: Improving     RT PROGRESS NOTE     DATA:     CURRENT SETTINGS:             TRACH TYPE / SIZE:  #7 Bivona placed on 07/5/21             MODE:   HFTM (cuff up)             FIO2:   30% and 35 lpm     ACTION:             THERAPIES:   DUO NEB Q6             SUCTION:                           FREQUENCY:  X6                        AMOUNT:   Moderate to large                        CONSISTENCY:   Thick                        COLOR:   White/yellow             SPONTANEOUS COUGH EFFORT/STRENGTH OF EFFORT (not elicited by suctioning): Yes:Strong                              WEANING PHASE:  #2                        WEAN MODE:    HFTM as erji                        WEAN TIME:                           END WEAN REASON:  Cont     RESPONSE:             BS:   Coarse             VITAL SIGNS:  SAT 95-97%, HR 83-98, RR 18-22             EMOTIONAL NEEDS / CONCERNS: N/A                RISK FOR SELF DECANNULATION:  N/A                        RISK DUE TO:                          INTERVENTION/S IN PLACE IS/ARE: N/A      NOTE / PLAN:   Pt is on hftm 30% and 35 lpm with cuff up, reji well. Cont current therapy and keep sat >/=90%

## 2021-07-25 NOTE — PLAN OF CARE
Problem: Restraint for Non-Violent/Non-Self-Destructive Behavior  Goal: Prevent/Manage Potential Problems  Description: Maintain safety of patient and others during period of restraint.  Promote psychological and physical wellbeing.  Prevent injury to skin and involved body parts.  Promote nutrition, hydration, and elimination.  Outcome: Improving  Pt remains on mitts x2 restraints for safety. Pt was calm throughout shift. Had moderate amount of secretions that he was mostly able to cough out. Tolerated cares well.

## 2021-07-25 NOTE — PROGRESS NOTES
[facility]    Medicine Progress Note - Hospitalist Service       Date of Admission:  6/11/2021    Identifier:  69 yo M with h/o HTN who presented to ED on 5/15/2021 with acute SAH after a fall with unresponsiveness.  He underwent emergent left external ventricular drain placement.  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  On 5/28/2021, patient had persistent cerebral vasospasm, CT head showed bilateral BAYLEE infarct and was treated with milrinone and verapamil.  On 6/1/2021, angiogram showed no evidence of vasospasm.  Extubated on 6/3/2021.  6/6/2021 he was reintubated due to hypoxia.  On 6/7/2021, patient underwent tracheostomy and PEG tube placement.  EVD removed 6/9/2021.    Unasyn for possible pneumonia on 5/20/2021 which was switched to ceftriaxone the next day for sputum culture growing Klebsiella.  On 5/23, sputum culture also grew Pseudomonas so switched to Zosyn.  Due to increased WBCs in CSF possibility of ventriculitis was raised however CSF culture was negative but antibiotic was switched to cefepime and vancomycin on 5/28/2021 for 2 weeks.  Vanco was discontinued on 6/9/2021.  On 6/5/2021, sputum grew ESBL so cefepime was switched to meropenem.  He was transferred to [facility] on 6/11/21. Repeat sputum culture 6/19 showed MDR pseudomonas and he was started on tobramycin nebs from 6/24/2021 - 7/9/2021 . He has persistent tracheal secretions.  6/14/21 CT head done for fatigue- read as slight increase in caliber of lateral and third ventricle with possibility of developing communicating hydrocephalus. Discussed with Dr. Casillas (neurosurgeon at [facility]), who did not think there was much change, so repeat CT head on 6/16/21 was done which did not show any change.     RT and pulmonary are following and pt is on phase 2 weaning with TM/PMV during day and is off the vent at night the last few days. Vent weaning had been  slowed due to poor cough and secretions.Mental status is slowly improving. He is tolerating tube feeding.     7/8/2021 aspirated tube feeds.  Spiked a fever that evening 102.8 and WBC went up to 36.9 the next morning so ID reconsulted and was started on ceftazadime-avibactam along with flagyl 7/9/2021 - 7/18/21.    He has had repeated bouts of Klebsiella infection in sputum and ventilator associated pneumonia.  Additionally he has had a large amount of tracheal secretions, significant cognitive impairment, left-sided neglect and ongoing episodes of agitation.  Patient's family has been monitoring the patient continuously during the daytime hours via video monitor and visit with him in person over the weekends.  Multiple specialties have been involved in the care of this patient including palliative care, infectious disease pulmonary medicine and neurology amongst others.    Family care progression conference held 7/23/2021.  Expectations discussed.    Assessment & Plan            Acute on chronic hypoxemic respiratory failure with prolonged hospitalization after initial significant intracranial hemorrhage.  One of the biggest barriers to decannulation is related to the issue of excessive secretions.  Glycopyrrolate was tried and stopped.  Also diuresed him with Lasix for 3 doses with some improvement however that improvement seems to have stabilized or plateaued.  Scopolamine started 7/20/2021  Speaking valve is being attempted in short trials but does aspirate with blue dye test so needs to be only with speech/RT present.   Insomnia: is on Ritalin which was decreased to once daily due to insomnia when taken later in day.    Started with nighttime mirtazapine 7.5 mg scheduled.  Can be uptitrated as appropriate.    Traumatic brain injury/intracranial hemorrhage: Effexor was started at 25 mg 3 times a day and was uptitrated 7/20/21 to 37.53 times a day.  Will need follow up with Dr. Martinez with Neurosurgery in 3  months with repeat CTA of head and neck for post-op f/u.   Dysphagia: Secondary to respiratory failure continue with tube feeds at this time.  Patient failed the blue dye test 7/20/21 with immediate aspiration, continue to use tube feeds   Ventilator associated pneumonia appreciate intervention and recommendations by infectious disease antibiotics finished on July 18.   Prognosis and logistics: This is challenge as the patient is unlikely going to be his prehospitalization self.   Restraints: We have been using mittens and that seems to be eliminating the need for a sitter.    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   Continue pulmonary toilet, scheduled duonebs   Acute metabolic encephalopathy - trial of Ritalin did not seem to help much - had to back down to just in the morning.  Slow to improve   Anemia - likely ACD   Hypertension - on lisinopril   Sebaceous cyst left inguinal area along with left inguinal hernia - needs outpatient surgical f/u   Severe debility, weakness, critical illness, deconditioning, Continue PT/OT   Hypercalcemia - unclear etiology, mild at this point. May be related to relative immobilization as he is walking with therapy but only 1-2 times a day.       Diet: Adult Formula Drip Feeding: Continuous Osmolite 1.5; Gastrostomy; Goal Rate: 90; mL/hr; Medication - Feeding Tube Flush Frequency: 240 mL q 4 hours water flushes; Amount to Send (Nutrition use): send 9 packets Nutrisource Fiber daily; Tube Feeding...    DVT Prophylaxis: Heparin SQ  Gutierrez Catheter: Not present  Central Lines: None  Code Status: Full Code    In person face to face assessment was completed, including an evaluation of the patient's immediate reaction to the intervention, behavioral assessment and review/assessment of history, drugs and medications, recent labs, etc., and behavioral condition.   The patient requires  bilateral Mitt restraints due to pulling on lines and patient safety.The patient experienced: No adverse physical outcome from seclusion/restraint initiation. The intervention of restraint or seclusion needs to continue.    Disposition Plan   Expected discharge: Unclear at this time recommended to transitional care unit once secretions have decreased some.     The patient's care was discussed with the Patient.    Tomas Cormier MD  Hospitalist Service  [facility]      ______________________________________________________________________      Data reviewed today: I reviewed all medications, new labs and imaging results over the last 24 hours.     Physical Exam   Vital Signs: Temp: 98.6  F (37  C) Temp src: Axillary BP: 109/63 Pulse: 86   Resp: 22 SpO2: 98 % O2 Device: Trach dome Oxygen Delivery: 25 LPM  Weight: 145 lbs 6.4 oz  General Appearance: Awake sitting in bed, left-sided neglect but responds to voice, said hello  Respiratory: Coarse breath sounds tracheostomy in place  Cardiovascular: S1-S2 regular rate and rhythm  GI: Soft nondistended bowel sounds are present feeding tube noted  Skin: No rashes or lesions.    Other: No lower extremity swelling  Patient is moving both upper extremities without any difficulty and constantly fidgeting  Neurological exam: moves left foot on command.  Will stick out tongue an show teeth when asked.    Data   Recent Labs   Lab 07/25/21  0705 07/23/21  0627 07/22/21  0700 07/21/21  0703 07/20/21  1449 07/20/21  0650   WBC 10.4  --  8.5  --  12.0* 13.7*   HGB 10.5*  --  10.3*  --   --  10.3*   MCV 98  --  97  --   --  96   *  --  499*  --   --  462*   NA  --  141 140 140  --   --    POTASSIUM  --  4.3 4.4 4.7  --   --    CHLORIDE  --  101 100 100  --   --    CO2  --  30 30 31  --   --    BUN  --  21 24* 23*  --   --    CR  --  0.63* 0.63* 0.61*  --   --    ANIONGAP  --  10 10 9  --   --    RAGINI 10.8* 10.9* 10.6* 10.6*  --   --    GLC  --  101 120 119  --   --

## 2021-07-25 NOTE — PLAN OF CARE
Per report of primary RN patients bed alarm went off, staff entered room to see patient at the foot of the bed almost about to stand up-with 1 wrist restraint and bilateral mitts on. Patient had done this last evening as well. Sent text to md to ask for a 1 to 1 order-continue to monitor.

## 2021-07-25 NOTE — PLAN OF CARE
Problem: Restraint for Non-Violent/Non-Self-Destructive Behavior  Goal: Prevent/Manage Potential Problems  Description: Maintain safety of patient and others during period of restraint.  Promote psychological and physical wellbeing.  Prevent injury to skin and involved body parts.  Promote nutrition, hydration, and elimination.  Outcome: No Change     Problem: Skin and Tissue Injury (Artificial Airway)  Goal: Absence of Device-Related Skin or Tissue Injury  Outcome: No Change     Problem: Adult Inpatient Plan of Care  Goal: Absence of Hospital-Acquired Illness or Injury  Intervention: Identify and Manage Fall Risk  Recent Flowsheet Documentation  Taken 7/25/2021 0038 by Kristal Jaramillo, RN  Safety Promotion/Fall Prevention:   bed alarm on   room door open   room near nurse's station   safety round/check completed

## 2021-07-25 NOTE — PLAN OF CARE
Problem: Restraint for Non-Violent/Non-Self-Destructive Behavior  Goal: Prevent/Manage Potential Problems  Description: Maintain safety of patient and others during period of restraint.  Promote psychological and physical wellbeing.  Prevent injury to skin and involved body parts.  Promote nutrition, hydration, and elimination.  Outcome: No Change   Pt still on sally. Mitts for safety.   Problem: Communication Impairment (Artificial Airway)  Goal: Effective Communication  Outcome: No Change   Prn suctioning done, moderate-copious amount of secretion every time. Wife had concern re: scopolamine patch. Noted left for MD. Got up in th chair x1. Resting in bed at this time.

## 2021-07-26 ENCOUNTER — APPOINTMENT (OUTPATIENT)
Dept: OCCUPATIONAL THERAPY | Facility: CLINIC | Age: 71
DRG: 207 | End: 2021-07-26
Attending: HOSPITALIST
Payer: COMMERCIAL

## 2021-07-26 ENCOUNTER — APPOINTMENT (OUTPATIENT)
Dept: RADIOLOGY | Facility: CLINIC | Age: 71
DRG: 207 | End: 2021-07-26
Attending: INTERNAL MEDICINE
Payer: COMMERCIAL

## 2021-07-26 ENCOUNTER — APPOINTMENT (OUTPATIENT)
Dept: SPEECH THERAPY | Facility: CLINIC | Age: 71
DRG: 207 | End: 2021-07-26
Attending: HOSPITALIST
Payer: COMMERCIAL

## 2021-07-26 ENCOUNTER — APPOINTMENT (OUTPATIENT)
Dept: PHYSICAL THERAPY | Facility: CLINIC | Age: 71
DRG: 207 | End: 2021-07-26
Attending: HOSPITALIST
Payer: COMMERCIAL

## 2021-07-26 LAB
ANION GAP SERPL CALCULATED.3IONS-SCNC: 12 MMOL/L (ref 5–18)
BUN SERPL-MCNC: 27 MG/DL (ref 8–22)
CALCIUM SERPL-MCNC: 10.7 MG/DL (ref 8.5–10.5)
CHLORIDE BLD-SCNC: 98 MMOL/L (ref 98–107)
CO2 SERPL-SCNC: 29 MMOL/L (ref 22–31)
CREAT SERPL-MCNC: 0.72 MG/DL (ref 0.7–1.3)
GFR SERPL CREATININE-BSD FRML MDRD: >90 ML/MIN/1.73M2
GLUCOSE BLD-MCNC: 132 MG/DL (ref 70–125)
HOLD SPECIMEN: NORMAL
POTASSIUM BLD-SCNC: 4.4 MMOL/L (ref 3.5–5)
PROCALCITONIN SERPL-MCNC: 0.1 NG/ML (ref 0–0.49)
PTH-INTACT SERPL-MCNC: 21 PG/ML (ref 10–86)
SODIUM SERPL-SCNC: 139 MMOL/L (ref 136–145)

## 2021-07-26 PROCEDURE — 94640 AIRWAY INHALATION TREATMENT: CPT | Mod: 76

## 2021-07-26 PROCEDURE — 99233 SBSQ HOSP IP/OBS HIGH 50: CPT | Performed by: NURSE PRACTITIONER

## 2021-07-26 PROCEDURE — 92507 TX SP LANG VOICE COMM INDIV: CPT | Mod: GN

## 2021-07-26 PROCEDURE — 250N000009 HC RX 250: Performed by: INTERNAL MEDICINE

## 2021-07-26 PROCEDURE — 97110 THERAPEUTIC EXERCISES: CPT | Mod: GP

## 2021-07-26 PROCEDURE — 99233 SBSQ HOSP IP/OBS HIGH 50: CPT | Performed by: INTERNAL MEDICINE

## 2021-07-26 PROCEDURE — 999N000123 HC STATISTIC OXYGEN O2DAILY TECH TIME

## 2021-07-26 PROCEDURE — 80048 BASIC METABOLIC PNL TOTAL CA: CPT | Performed by: HOSPITALIST

## 2021-07-26 PROCEDURE — 36415 COLL VENOUS BLD VENIPUNCTURE: CPT | Performed by: HOSPITALIST

## 2021-07-26 PROCEDURE — 250N000013 HC RX MED GY IP 250 OP 250 PS 637: Performed by: INTERNAL MEDICINE

## 2021-07-26 PROCEDURE — 999N000009 HC STATISTIC AIRWAY CARE

## 2021-07-26 PROCEDURE — 250N000013 HC RX MED GY IP 250 OP 250 PS 637: Performed by: HOSPITALIST

## 2021-07-26 PROCEDURE — 250N000013 HC RX MED GY IP 250 OP 250 PS 637

## 2021-07-26 PROCEDURE — 120N000017 HC R&B RESPIRATORY CARE

## 2021-07-26 PROCEDURE — 97530 THERAPEUTIC ACTIVITIES: CPT | Mod: GO | Performed by: OCCUPATIONAL THERAPIST

## 2021-07-26 PROCEDURE — 36415 COLL VENOUS BLD VENIPUNCTURE: CPT | Performed by: INTERNAL MEDICINE

## 2021-07-26 PROCEDURE — 999N000157 HC STATISTIC RCP TIME EA 10 MIN

## 2021-07-26 PROCEDURE — 97129 THER IVNTJ 1ST 15 MIN: CPT | Mod: GO | Performed by: OCCUPATIONAL THERAPIST

## 2021-07-26 PROCEDURE — 250N000011 HC RX IP 250 OP 636

## 2021-07-26 PROCEDURE — 84145 PROCALCITONIN (PCT): CPT | Performed by: INTERNAL MEDICINE

## 2021-07-26 PROCEDURE — 71045 X-RAY EXAM CHEST 1 VIEW: CPT

## 2021-07-26 RX ORDER — ACETYLCYSTEINE 200 MG/ML
2 SOLUTION ORAL; RESPIRATORY (INHALATION) 2 TIMES DAILY
Status: DISCONTINUED | OUTPATIENT
Start: 2021-07-26 | End: 2021-09-27 | Stop reason: HOSPADM

## 2021-07-26 RX ORDER — SODIUM CHLORIDE FOR INHALATION 3 %
3 VIAL, NEBULIZER (ML) INHALATION 2 TIMES DAILY
Status: DISCONTINUED | OUTPATIENT
Start: 2021-07-26 | End: 2021-08-27

## 2021-07-26 RX ADMIN — LISINOPRIL 40 MG: 20 TABLET ORAL at 18:37

## 2021-07-26 RX ADMIN — VENLAFAXINE 37.5 MG: 37.5 TABLET ORAL at 11:39

## 2021-07-26 RX ADMIN — METHYLPHENIDATE HYDROCHLORIDE 10 MG: 10 TABLET ORAL at 05:42

## 2021-07-26 RX ADMIN — HEPARIN SODIUM 5000 UNITS: 5000 INJECTION, SOLUTION INTRAVENOUS; SUBCUTANEOUS at 21:45

## 2021-07-26 RX ADMIN — ACETYLCYSTEINE 2 ML: 200 SOLUTION ORAL; RESPIRATORY (INHALATION) at 20:13

## 2021-07-26 RX ADMIN — Medication 3 PACKET: at 14:24

## 2021-07-26 RX ADMIN — VENLAFAXINE 37.5 MG: 37.5 TABLET ORAL at 17:20

## 2021-07-26 RX ADMIN — ANORECTAL OINTMENT: 15.7; .44; 24; 20.6 OINTMENT TOPICAL at 14:24

## 2021-07-26 RX ADMIN — HEPARIN SODIUM 5000 UNITS: 5000 INJECTION, SOLUTION INTRAVENOUS; SUBCUTANEOUS at 14:24

## 2021-07-26 RX ADMIN — IPRATROPIUM BROMIDE AND ALBUTEROL SULFATE 3 ML: .5; 3 SOLUTION RESPIRATORY (INHALATION) at 14:30

## 2021-07-26 RX ADMIN — ANORECTAL OINTMENT: 15.7; .44; 24; 20.6 OINTMENT TOPICAL at 08:12

## 2021-07-26 RX ADMIN — FAMOTIDINE 20 MG: 20 TABLET, FILM COATED ORAL at 21:45

## 2021-07-26 RX ADMIN — Medication 3 PACKET: at 21:45

## 2021-07-26 RX ADMIN — IPRATROPIUM BROMIDE AND ALBUTEROL SULFATE 3 ML: .5; 3 SOLUTION RESPIRATORY (INHALATION) at 07:59

## 2021-07-26 RX ADMIN — ANORECTAL OINTMENT: 15.7; .44; 24; 20.6 OINTMENT TOPICAL at 21:46

## 2021-07-26 RX ADMIN — IPRATROPIUM BROMIDE AND ALBUTEROL SULFATE 3 ML: .5; 3 SOLUTION RESPIRATORY (INHALATION) at 01:08

## 2021-07-26 RX ADMIN — SODIUM CHLORIDE 30 MG/ML INHALATION SOLUTION 3 ML: 30 SOLUTION INHALANT at 20:22

## 2021-07-26 RX ADMIN — IPRATROPIUM BROMIDE AND ALBUTEROL SULFATE 3 ML: .5; 3 SOLUTION RESPIRATORY (INHALATION) at 20:13

## 2021-07-26 RX ADMIN — VENLAFAXINE 37.5 MG: 37.5 TABLET ORAL at 08:12

## 2021-07-26 RX ADMIN — Medication 3 PACKET: at 08:11

## 2021-07-26 RX ADMIN — HEPARIN SODIUM 5000 UNITS: 5000 INJECTION, SOLUTION INTRAVENOUS; SUBCUTANEOUS at 05:43

## 2021-07-26 RX ADMIN — MIRTAZAPINE 7.5 MG: 7.5 TABLET ORAL at 21:45

## 2021-07-26 RX ADMIN — SCOPALAMINE 1 PATCH: 1 PATCH, EXTENDED RELEASE TRANSDERMAL at 14:23

## 2021-07-26 RX ADMIN — FAMOTIDINE 20 MG: 20 TABLET, FILM COATED ORAL at 08:12

## 2021-07-26 ASSESSMENT — MIFFLIN-ST. JEOR
SCORE: 1445.96
SCORE: 1451.4

## 2021-07-26 NOTE — PLAN OF CARE
Problem: Anxiety  Goal: Anxiety Reduction or Resolution  Outcome: Improving     Problem: Device-Related Complication Risk (Artificial Airway)  Goal: Optimal Device Function  Outcome: Improving  Intervention: Optimize Device Care and Function  Recent Flowsheet Documentation  Taken 7/26/2021 0851 by Lilly Woodard, RN  Oral Care:   lip lubricant applied   swabbed with antiseptic solution  Taken 7/26/2021 0200 by Lilly Woodard, RN  Airway Safety Measures: all equipment/monitors on and audible   Vitals stable. Denied pain. Patient was repositioned every 2 hours. No signs of anxiety noted. Slept well this shift. Will continue plan of cares.

## 2021-07-26 NOTE — PLAN OF CARE
Problem: Communication Impairment (Artificial Airway)  Goal: Effective Communication  Outcome: Improving     Problem: Device-Related Complication Risk (Artificial Airway)  Goal: Optimal Device Function  Outcome: Improving     Problem: Skin and Tissue Injury (Artificial Airway)  Goal: Absence of Device-Related Skin or Tissue Injury  Outcome: Improving     RT PROGRESS NOTE     DATA:     CURRENT SETTINGS:             TRACH TYPE / SIZE:  #7 Bivona placed on 7/5/21             MODE:   HFTM (Cuff up)             FIO2:   30% and 30 lpm     ACTION:             THERAPIES:   DUO NEB Q6             SUCTION:                           FREQUENCY:  X6                        AMOUNT:   Large to copious                         CONSISTENCY:   Thick                        COLOR:   White/yellow             SPONTANEOUS COUGH EFFORT/STRENGTH OF EFFORT (not elicited by suctioning): Yes:Strong                              WEANING PHASE: #2                        WEAN MODE:   HFTM as reji                        WEAN TIME:                           END WEAN REASON:  Cont     RESPONSE:             BS:   Coarse             VITAL SIGNS: SAT 94-97%, HR 78-84, RR 18-22             EMOTIONAL NEEDS / CONCERNS:  Confuse               RISK FOR SELF DECANNULATION:  Yes                        RISK DUE TO:  Restless                        INTERVENTION/S IN PLACE IS/ARE: Bilateral mitts       NOTE / PLAN:   Pt is on hftm 30% and 35 lpm with cuff up, reji well, cont current therapy and keep sat >/=90%

## 2021-07-26 NOTE — PLAN OF CARE
Problem: Restraint for Non-Violent/Non-Self-Destructive Behavior  Goal: Prevent/Manage Potential Problems  Description: Maintain safety of patient and others during period of restraint.  Promote psychological and physical wellbeing.  Prevent injury to skin and involved body parts.  Promote nutrition, hydration, and elimination.  Outcome: No Change   Still on Ari. Mitts for safety.   Problem: Anemia  Goal: Anemia Symptom Improvement  Outcome: Improving   Hgb was 10.3 yesterday. Order now is Mon & thurs recheck. Wife in ipad most of the time, updated. Pt incontinent of bladder, with condom cath on. Resting in bed at this time.

## 2021-07-26 NOTE — PROGRESS NOTES
Pulmonary Medicine Follow up Note - Ventilator Rounds:    Clinical status discussed today with respiratory therapist.    Chief complaint: Ongoing respiratory failure  Significant change in clinical status during past 24 hours? -  Tolerating trach mask 24 hours with copious thick white secretions, HFTM 40 L/min 30%. Minimally responsive.     FiO2 (%): 30 %  Resp: 20    Tracheal secretions: copious thick white   Current phase of ventilator weaning pathway:  Phase 2  Ventilator weaning results from yesterday: 24 hours trach mask with HFTM 40 L/min 30%    Current Facility-Administered Medications   Medication     acetaminophen (TYLENOL) solution 650 mg    Or     acetaminophen (TYLENOL) tablet 650 mg     alteplase (CATHFLO ACTIVASE) injection 2 mg     bisacodyl (DULCOLAX) Suppository 10 mg     dextrose 50 % injection 25-50 mL    Or     glucagon injection 1 mg     docusate (COLACE) 50 MG/5ML liquid 100 mg    Or     docusate sodium (COLACE) capsule 100 mg     famotidine (PEPCID) tablet 20 mg     fiber modular (NUTRISOURCE FIBER) packet 3 packet     heparin ANTICOAGULANT injection 5,000 Units     hydrALAZINE (APRESOLINE) injection 10 mg     ipratropium - albuterol 0.5 mg/2.5 mg/3 mL (DUONEB) neb solution 3 mL     lisinopril (ZESTRIL) tablet 40 mg     loperamide (IMODIUM) 1 MG/7.5ML liquid 1 mg     magnesium hydroxide (MILK OF MAGNESIA) suspension 30 mL     menthol-zinc oxide (CALMOSEPTINE) 0.44-20.6 % ointment OINT     methylphenidate (RITALIN) tablet 10 mg     mirtazapine (REMERON) tablet TABS 7.5 mg     naloxone (NARCAN) injection 0.2 mg    Or     naloxone (NARCAN) injection 0.4 mg    Or     naloxone (NARCAN) injection 0.2 mg    Or     naloxone (NARCAN) injection 0.4 mg     ondansetron (ZOFRAN) solution 4 mg     polyethylene glycol (MIRALAX) Packet 17 g     scopolamine (TRANSDERM) 72 hr patch 1 patch    And     scopolamine (TRANSDERM-SCOP) Patch in Place     sodium chloride (PF) 0.9% PF flush 3 mL     sodium chloride (PF)  "0.9% PF flush 3 mL     sodium chloride 0.9% infusion     venlafaxine (EFFEXOR) tablet 37.5 mg       Physical exam     /80 (BP Location: Left arm)   Pulse 95   Temp 98.5  F (36.9  C) (Oral)   Resp 20   Ht 1.803 m (5' 10.98\")   Wt 65.4 kg (144 lb 3.2 oz)   SpO2 96%   BMI 20.12 kg/m      Gen: will resist to manual eyelid opening, otherwise minimally responsive, trach on trach mask  HEENT: pale conjunctiva, moist mucosa  Neck: s/p trach   Lungs: CTA anterolaterally  CV: regular, no murmurs or gallops appreciated  Abdomen: soft, NT, BS wnl  Ext: no edema  Neuro: eyes closed, resists manual eyelid opening, poor interaction, no limb movement for me    Sodium   Date Value Ref Range Status   07/26/2021 139 136 - 145 mmol/L Final   06/11/2021 143 133 - 144 mmol/L Final     Potassium   Date Value Ref Range Status   07/26/2021 4.4 3.5 - 5.0 mmol/L Final   06/11/2021 3.8 3.4 - 5.3 mmol/L Final     Chloride   Date Value Ref Range Status   07/26/2021 98 98 - 107 mmol/L Final   06/11/2021 106 94 - 109 mmol/L Final     Carbon Dioxide   Date Value Ref Range Status   06/11/2021 36 (H) 20 - 32 mmol/L Final     Carbon Dioxide (CO2)   Date Value Ref Range Status   07/26/2021 29 22 - 31 mmol/L Final     Anion Gap   Date Value Ref Range Status   07/26/2021 12 5 - 18 mmol/L Final   06/11/2021 2 (L) 3 - 14 mmol/L Final     Glucose   Date Value Ref Range Status   07/26/2021 132 (H) 70 - 125 mg/dL Final   06/11/2021 142 (H) 70 - 99 mg/dL Final     Urea Nitrogen   Date Value Ref Range Status   07/26/2021 27 (H) 8 - 22 mg/dL Final   06/11/2021 25 7 - 30 mg/dL Final     Creatinine   Date Value Ref Range Status   07/26/2021 0.72 0.70 - 1.30 mg/dL Final   06/11/2021 0.57 (L) 0.66 - 1.25 mg/dL Final     GFR Estimate   Date Value Ref Range Status   07/26/2021 >90 >60 mL/min/1.73m2 Final     Comment:     As of July 11, 2021, eGFR is calculated by the CKD-EPI creatinine equation, without race adjustment. eGFR can be influenced by muscle " mass, exercise, and diet. The reported eGFR is an estimation only and is only applicable if the renal function is stable.   07/09/2021 >60 >60 mL/min/1.73m2 Final   06/11/2021 >90 >60 mL/min/[1.73_m2] Final     Comment:     Non  GFR Calc  Starting 12/18/2018, serum creatinine based estimated GFR (eGFR) will be   calculated using the Chronic Kidney Disease Epidemiology Collaboration   (CKD-EPI) equation.       Calcium   Date Value Ref Range Status   07/26/2021 10.7 (H) 8.5 - 10.5 mg/dL Final   06/11/2021 9.6 8.5 - 10.1 mg/dL Final       XR CHEST PORT 1 VIEW  LOCATION: [facility]  DATE/TIME: 7/15/2021 7:50 AM  INDICATION: New VAP followup.  COMPARISON: 07/09/2021.  IMPRESSION:   No change in tracheostomy tube. Heart and mediastinal size are stable. There is indistinctness of the pulmonary interstitium, representing either airway inflammation or edema. There is bandlike opacity involving the retrocardiac region of the left lung base, increased from prior study and representing either atelectasis or infectious process. No pleural effusion or pneumothorax. There is some mild elevation of the right hemidiaphragm.    Diagnosis:     Patient is a 71 yo man with history of hypertension. Patient initially presented on 15-May-2021 to [facility] after being found unresponsive by his wife. Patient was then found to have a subarachnoid hemorrhage secondary to a ruptured aneurysm. Patient was intubated and was started on mannitol, IV antihypertensives and hyperventilation and patient was then transferred to [facility]. Patient underwent aneurysm clipping as well as placement of extra-ventricular drain from hydrocephalus on 15-May-2021. Extra-ventricular drain would malfunction and would require replacement on 04-Jun-2021 and 06-Jun-2021. Extra-ventricular drain reportedly was removed on 09-Jun-2021. Patient had intra-arterial vasospasm treatment with verapamil on  28-May-2021.  Patient was extubated on 16-May-2021 but had to be reintubated on 19-May-2021. Patient would be extubated on 03-Jun-2021 but would be reintubated on 06-Jun-2021. Patient had tracheostomy and PEG-tube placed on 07-Jun-2021. Patient required treatment for hospital-acquired pneumonia. Patient was transferred to LT[facility] on 11-Jun-2021.    1. Acute respiratory failure s/p trach 6/7/2021  2. S/p treatment pseudomonas ESBL and klebsiella pneumonia   3. Encephalopathy   4. Subarachnoid bleed s/p craniotomy and clipping P-comm rupture aneurysm.   5. HTN  6. Malnourishment   7. Dysphagia s/p G tube    Recommendations/Plans (MDM):  1. Weaning orders: continue phase 2  2. Trach change? no  3. Diagnostic testing? CXR, tracheal aspirate culture, procalcitonin  4. Continue scopolamine patch for now but watch for secretion inspissation, anticholinergic neurologic side effects; would not increase dose  5. Continue scheduled ipratopium/albuterol nebs  6. Add metaneb, acetylcysteine, and 3% saline nebs BID  7. DVT prophylaxis heparin subcutaneous  8. Continue to address goals of care; appreciate palliative care  9. Discussed with RT, Dr. Cormier, and patient's wife via iPad    Jamin Davis MD  Pulmonary and Critical Care Medicine  [facility]  Cell [phone]  Pager [phone]  Office [phone]  (he/him/his)

## 2021-07-26 NOTE — PROGRESS NOTES
PALLIATIVE CARE PROGRESS NOTE     Patient Name: Dayron Neri  Date of Admission: 6/11/2021   Today the patient was seen for: psychosocial support for family      Impressions & Recommendations:  Goals of care per wife    Restorative: continue all current therapies/treatments    To regain his speech, have less secretions    Progress towards decannulation     Code Status: Full Code    Symptom management    Encephalopathy in setting of SAH, b/l infarct.  Started on Ritalin 7/7, backed down d/t insomnia.  Started on Effexor and uptitrated 7/20 per [facility]  Slow progression.   o C/w Effexor and Ritalin    Insomnia  o C/w mirtazapine  o if issues with ongoing insomnia, consider stopping Ritalin    Tracheal secretions in setting of trach with recurrent pneumonias.  Was on robinul with minimal effect  o C/w Scopolamine patch.  Monitor closely for mucus plugging.      Psychosocial/spiritual support    Appreciate Palliative SW assistance    Patient is Christianity    Much support from wife and daughter      Advanced Care Planning    Patient has a completed Health Care Directive: no    Surrogate decision maker: wife Susy    Our service will f/u with patient every 2-3 weeks per family request.     ----------------------------------------------------------------------------------------------------------------  Summary:  70 yoM with PMH HTN admitted 5/15/21  for AMS and a fall. Found to have acute subarachnoid hemorrhage and underwent left external ventricular drain placement.  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  Course complicated by CT head showing bilateral BAYLEE infarct.  Extubated 5/16, re-intubated 5/19, extubated again 6/3/2021.  Re-intubated again 6/6/2021 d/t hypoxia.  S/p trach/PEG on 6/7/2021, EVD removed 6/9/2021.  Admitted to [facility] 6/11.  Aspirated tube feeds 7/8; antibiotics 7/9-7/18.  Ongoing issues with copious tracheal secretions now on scopolamine patch.       Interim: Liberated from the vent 7/19.  Ongoing issues with copious tracheal secretions now on scopolamine patch.  Speaking valve now on only for brief periods with supervision.   ----------------------------------------------------------------------------------------------------------------  Palliative Overview:  7/23: care conference.  Expectations discussed.    7/26: I visited patient at bedside.  He was alert and able to mouth some words and follow some simple commands.  Slightly restless.  No significant changes from my previous visits.  I spoke with Susy via telephone.  I asked if she had any questions or needed clarification about the previous care conference.  She was wondering if a 2nd scopolamine patch could be placed.  I noted this may put him at an increased risk for mucus plugging, and would recommend monitoring him on just 1 patch for now.  She was wondering if a recreational therapist could follow up with him.  We talked about his overall condition.  She still doesn't know why anyone can't fix his secretion issue.  I have discussed this topic at length with her several times noting it's multifactorial and unless he makes significant cognitive improvements, it may be an ongoing issue.  We talked about taking a break from the iPad as she is on it all day everyday.  She notes it's ok to not have it in front of Matt's face all day, but she will continue to have it on all the time on her end.      Key Palliative Symptom data:  Pain: denies  Dyspnea: denies  Nausea: denies  Anxiety: denies      Prognosis, Goals, and/or Advance Care Planning were addressed today: yes    Mood, coping, and/or meaning in the context of serious illness were addressed today: yes    Functional Status:  Current PPS% (100% normal, 0% death): 50    Capacity evaluation:    Patient does not have decision making capacity based on the following:     Ability to understand relevant information about his/her condition? no    Ability to  "demonstrate understanding of her illness and care needs? no    Ability to communicate her options for treatment? no       ROS  A full 14 point review of systems was otherwise completed and is negative aside from that mentioned above    -----------------------------------------------------------------------------------------------------------------  I have reviewed and supplemented the documentation in this patient's medical record listed below regarding active medical problems, allergies, and medications.     Current Problem List:   Active Problems:    Subarachnoid hemorrhage due to ruptured aneurysm (H)    Cognitive impairment    Acute respiratory failure with hypoxia and hypercapnia (H)    Other dysphagia    Left-sided neglect      Allergies:  No Known Allergies    PERTINENT PHYSICAL EXAMINATION:  Vital Signs: Blood pressure 122/63, pulse 95, temperature 98.4  F (36.9  C), temperature source Oral, resp. rate 20, height 1.803 m (5' 10.98\"), weight 65.4 kg (144 lb 3.2 oz), SpO2 97 %.   GENERAL: sitting in chair in NAD.  B/l hand mitts on    SKIN: Warm and dry   HEENT: Normocephalic, anicteric sclera, moist mucous membranes, trach intact/cuff up  LUNGS: Clear to auscultation anterolaterally; non-labored   CARDIAC: RRR, normal s1/s2, w/o m/r/g   ABDOMINAL: BS (+), soft, non distended, non tender  MUSKL: no gross joint deformities   EXTREMITIES: No edema or cyanosis, pulses 2+ and symmetrical  NEUROLOGIC: alert, follows simple commands    All labs/imaging reviewed in Epic     ====================================================  TT: I have personally spent a total of 35 minutes on the unit in review of medical record, consultation with the medical providers and assessment of patient today, with more than 50% of this time spent in counseling, coordination of care, and discussion with patient and wife re: prognosis, symptom management, risks and benefits of management options, and development of plan of care as noted " above.  ====================================================    Maximus Mathis [facility]  Palliative Medicine  Office: [phone]

## 2021-07-26 NOTE — PLAN OF CARE
Plan of Care by Jacqueline Vieyra RN at 2021  9:23 AM     Author: Jacqueline Vieyra RN Service: -- Author Type: RN, Care Manager    Filed: 2021  9:33 AM Date of Service: 2021  9:23 AM Status: Signed    : Jacqueline Vieyra RN (RN, Care Manager)       Care Management Progression of Care Update        DR GLOS - Target D/C Date TBD        PLAN/GOALS  1. Pulmonary following. Phase 2 wean~continous heated trach mask with hi rosemarie 30L/35% Fi02 days.  Duo-neb six times a day for pulmonary hygiene.  Frequent suctioning 7x/shift.    2.  Nutrition management.  Tube feeding via PEG placed 21.  Registered dietician to monitor tube feeding tolerance, weight and labs.    3.  Neurology following intermittently.    4. PT/OT 5x/week.     5.  Speech therapy 3x/week. Patient remains at high risk for aspiration.    6.  Palliative following.    7.  Care conference held  with attending physician, pulmonologist, palliative, PT/OT/SLP therapists and .  Discussion of the current plans, his progress and expectations going forward.  Conclusion was patient will need long-term help with daily cares but is is still unclear just how much help he will need.           BARRIERS  1.  Acute hypoxic respiratory failure due to subarachnoid hemorrhage. T trach and oxygen wean.    2.  Acute metabolic encephalopathy.    3.  Oropharyngeal dysphagia.    4.   Bilateral mitts for pulling at tubes and lines.    5.  Frequent suctioning of copious secretions.        Disposition:  TCU  Care Manager Name:  Jacqueline Vieyra RN,BSN, HNB-BC    Date/Time:  21 @ 1:14 PM

## 2021-07-26 NOTE — PLAN OF CARE
PROGRESS NOTE     DATA:     CURRENT SETTINGS:               TRACH TYPE / SIZE: #7 [place], placed 7/5/21             MODE:  HF 30L TM, cuff up   FIO2:  30%     ACTION:             THERAPIES: Duo neb x 2 Q6 given on scheduled time             SUCTION:                           FREQUENCY: 6                        AMOUNT: small/ mod x 3/ large/ copious                         CONSISTENCY:  thick with lavage x 2                        COLOR:   white              SPONTANEOUS COUGH EFFORT/STRENGTH OF EFFORT (not elicited by suctioning): not observed                              WEANING PHASE:   2                        WEAN MODE: 30% HF 30L TM, cuff up                        WEAN TIME:  cont as tolerated                        END WEAN REASON:      RESPONSE:             BS:   coarse to slightly coarse  T/O - clear decreased after Sx              VITAL SIGNS: O2 sat 93-97%, HR 85-96, RR 20-22             EMOTIONAL NEEDS / CONCERNS:  None               RISK FOR SELF DECANNULATION:  Yes                        RISK DUE TO: Confusion                        INTERVENTION/S IN PLACE IS/ARE: Restraints x2       NOTE / PLAN:  cont current plan of care - TM cont with cuff inflated, PMV with speech.

## 2021-07-26 NOTE — PROGRESS NOTES
[facility]    Medicine Progress Note - Hospitalist Service       Date of Admission:  6/11/2021    Identifier:  69 yo M with h/o HTN who presented to ED on 5/15/2021 with acute SAH after a fall with unresponsiveness.  He underwent emergent left external ventricular drain placement.  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  On 5/28/2021, patient had persistent cerebral vasospasm, CT head showed bilateral BAYLEE infarct and was treated with milrinone and verapamil.  On 6/1/2021, angiogram showed no evidence of vasospasm.  Extubated on 6/3/2021.  6/6/2021 he was reintubated due to hypoxia.  On 6/7/2021, patient underwent tracheostomy and PEG tube placement.  EVD removed 6/9/2021.    Unasyn for possible pneumonia on 5/20/2021 which was switched to ceftriaxone the next day for sputum culture growing Klebsiella.  On 5/23, sputum culture also grew Pseudomonas so switched to Zosyn.  Due to increased WBCs in CSF possibility of ventriculitis was raised however CSF culture was negative but antibiotic was switched to cefepime and vancomycin on 5/28/2021 for 2 weeks.  Vanco was discontinued on 6/9/2021.  On 6/5/2021, sputum grew ESBL so cefepime was switched to meropenem.  He was transferred to [facility] on 6/11/21. Repeat sputum culture 6/19 showed MDR pseudomonas and he was started on tobramycin nebs from 6/24/2021 - 7/9/2021 . He has persistent tracheal secretions.  6/14/21 CT head done for fatigue- read as slight increase in caliber of lateral and third ventricle with possibility of developing communicating hydrocephalus. Discussed with Dr. Casillas (neurosurgeon at [facility]), who did not think there was much change, so repeat CT head on 6/16/21 was done which did not show any change.     RT and pulmonary are following and pt is on phase 2 weaning with TM/PMV during day and is off the vent at night the last few days. Vent weaning had been  slowed due to poor cough and secretions.Mental status is slowly improving. He is tolerating tube feeding.     7/8/2021 aspirated tube feeds.  Spiked a fever that evening 102.8 and WBC went up to 36.9 the next morning so ID reconsulted and was started on ceftazadime-avibactam along with flagyl 7/9/2021 - 7/18/21.    He has had repeated bouts of Klebsiella infection in sputum and ventilator associated pneumonia.  Additionally he has had a large amount of tracheal secretions, significant cognitive impairment, left-sided neglect and ongoing episodes of agitation.  Patient's family has been monitoring the patient continuously during the daytime hours via video monitor and visit with him in person over the weekends.  Multiple specialties have been involved in the care of this patient including palliative care, infectious disease pulmonary medicine and neurology amongst others.    Family care progression conference held 7/23/2021.  Expectations discussed.    Assessment & Plan            Acute on chronic hypoxemic respiratory failure with prolonged hospitalization after initial significant intracranial hemorrhage.  One of the biggest barriers to decannulation is related to the issue of excessive secretions.  Glycopyrrolate was tried and stopped.  Also diuresed him with Lasix for 3 doses with some improvement however that improvement seems to have stabilized or plateaued.  Scopolamine started 7/20/2021  Speaking valve is being attempted in short trials but does aspirate with blue dye test so needs to be only with speech/RT present.   Insomnia: is on Ritalin which was decreased to once daily due to insomnia when taken later in day.    Started with nighttime mirtazapine 7.5 mg scheduled.  Can be uptitrated as appropriate.    Traumatic brain injury/intracranial hemorrhage: Effexor was started at 25 mg 3 times a day and was uptitrated 7/20/21 to 37.53 times a day.  Will need follow up with Dr. Martinez with Neurosurgery in 3  months with repeat CTA of head and neck for post-op f/u.   Dysphagia: Secondary to respiratory failure continue with tube feeds at this time.  Patient failed the blue dye test 7/20/21 with immediate aspiration, continue to use tube feeds   Ventilator associated pneumonia appreciate intervention and recommendations by infectious disease antibiotics finished on July 18.   Prognosis and logistics: This is challenge as the patient is unlikely going to be his prehospitalization self.   Restraints: We have been using mittens and that seems to be eliminating the need for a sitter.    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   Continue pulmonary toilet, scheduled duonebs   Acute metabolic encephalopathy - trial of Ritalin did not seem to help much - had to back down to just in the morning.  Slow to improve   Anemia - likely ACD   Hypertension - on lisinopril   Sebaceous cyst left inguinal area along with left inguinal hernia - needs outpatient surgical f/u   Severe debility, weakness, critical illness, deconditioning, Continue PT/OT   Hypercalcemia - unclear etiology, mild at this point. May be related to relative immobilization as he is walking with therapy but only 1-2 times a day.       Diet: Adult Formula Drip Feeding: Continuous Osmolite 1.5; Gastrostomy; Goal Rate: 90; mL/hr; Medication - Feeding Tube Flush Frequency: 240 mL q 4 hours water flushes; Amount to Send (Nutrition use): send 9 packets Nutrisource Fiber daily; Tube Feeding...    DVT Prophylaxis: Heparin SQ  Gutierrez Catheter: Not present  Central Lines: None  Code Status: Full Code    In person face to face assessment was completed, including an evaluation of the patient's immediate reaction to the intervention, behavioral assessment and review/assessment of history, drugs and medications, recent labs, etc., and behavioral condition.   The patient requires  bilateral Mitt restraints due to pulling on lines and patient safety.The patient experienced: No adverse physical outcome from seclusion/restraint initiation. The intervention of restraint or seclusion needs to continue.    Disposition Plan   Expected discharge: Unclear at this time recommended to transitional care unit once secretions have decreased some.     The patient's care was discussed with the Care Coordinator/, Patient and Patient's Family and Dr. Davis with pulmonary.    Tomas Cormier MD  Hospitalist [facility]      ______________________________________________________________________      Data reviewed today: I reviewed all medications, new labs and imaging results over the last 24 hours.     Physical Exam   Vital Signs: Temp: 98.5  F (36.9  C) Temp src: Oral BP: 129/80 Pulse: 95   Resp: 20 SpO2: 96 % O2 Device: Trach dome Oxygen Delivery: 30 LPM  Weight: 145 lbs 6.4 oz  General Appearance: Awake sitting in bed, left-sided neglect but responds to voice, said hello  Respiratory: Coarse breath sounds tracheostomy in place  Cardiovascular: S1-S2 regular rate and rhythm  GI: Soft nondistended bowel sounds are present feeding tube noted  Skin: No rashes or lesions.    Other: No lower extremity swelling  Patient is moving both upper extremities without any difficulty and constantly fidgeting  Neurological exam: moves left foot on command.  Will stick out tongue an show teeth when asked.    Data   Recent Labs   Lab 07/26/21  0614 07/25/21  0705 07/23/21  0627 07/22/21  0700 07/20/21  1449 07/20/21  0650 07/20/21  0650   WBC  --  10.4  --  8.5 12.0*  --  13.7*   HGB  --  10.5*  --  10.3*  --   --  10.3*   MCV  --  98  --  97  --   --  96   PLT  --  489*  --  499*  --   --  462*     --  141 140  --    < >  --    POTASSIUM 4.4  --  4.3 4.4  --    < >  --    CHLORIDE 98  --  101 100  --    < >  --    CO2 29  --  30 30  --    < >  --    BUN 27*  --  21 24*  --    < >  --    CR 0.72   --  0.63* 0.63*  --    < >  --    ANIONGAP 12  --  10 10  --    < >  --    RAGINI 10.7* 10.8* 10.9* 10.6*  --    < >  --    *  --  101 120  --    < >  --     < > = values in this interval not displayed.

## 2021-07-27 ENCOUNTER — APPOINTMENT (OUTPATIENT)
Dept: OCCUPATIONAL THERAPY | Facility: CLINIC | Age: 71
DRG: 207 | End: 2021-07-27
Attending: HOSPITALIST
Payer: COMMERCIAL

## 2021-07-27 ENCOUNTER — APPOINTMENT (OUTPATIENT)
Dept: PHYSICAL THERAPY | Facility: CLINIC | Age: 71
DRG: 207 | End: 2021-07-27
Attending: HOSPITALIST
Payer: COMMERCIAL

## 2021-07-27 ENCOUNTER — APPOINTMENT (OUTPATIENT)
Dept: SPEECH THERAPY | Facility: CLINIC | Age: 71
DRG: 207 | End: 2021-07-27
Attending: HOSPITALIST
Payer: COMMERCIAL

## 2021-07-27 PROCEDURE — 99233 SBSQ HOSP IP/OBS HIGH 50: CPT | Performed by: INTERNAL MEDICINE

## 2021-07-27 PROCEDURE — 250N000013 HC RX MED GY IP 250 OP 250 PS 637

## 2021-07-27 PROCEDURE — 120N000017 HC R&B RESPIRATORY CARE

## 2021-07-27 PROCEDURE — 97112 NEUROMUSCULAR REEDUCATION: CPT | Mod: GP | Performed by: PHYSICAL THERAPIST

## 2021-07-27 PROCEDURE — 92526 ORAL FUNCTION THERAPY: CPT | Mod: GN | Performed by: SPEECH-LANGUAGE PATHOLOGIST

## 2021-07-27 PROCEDURE — 94664 DEMO&/EVAL PT USE INHALER: CPT

## 2021-07-27 PROCEDURE — 250N000013 HC RX MED GY IP 250 OP 250 PS 637: Performed by: HOSPITALIST

## 2021-07-27 PROCEDURE — 97530 THERAPEUTIC ACTIVITIES: CPT | Mod: GO | Performed by: OCCUPATIONAL THERAPIST

## 2021-07-27 PROCEDURE — 999N000123 HC STATISTIC OXYGEN O2DAILY TECH TIME

## 2021-07-27 PROCEDURE — 87077 CULTURE AEROBIC IDENTIFY: CPT | Performed by: INTERNAL MEDICINE

## 2021-07-27 PROCEDURE — 97530 THERAPEUTIC ACTIVITIES: CPT | Mod: GP | Performed by: PHYSICAL THERAPIST

## 2021-07-27 PROCEDURE — 250N000011 HC RX IP 250 OP 636

## 2021-07-27 PROCEDURE — 250N000013 HC RX MED GY IP 250 OP 250 PS 637: Performed by: INTERNAL MEDICINE

## 2021-07-27 PROCEDURE — 94640 AIRWAY INHALATION TREATMENT: CPT | Mod: 76

## 2021-07-27 PROCEDURE — 94660 CPAP INITIATION&MGMT: CPT

## 2021-07-27 PROCEDURE — 999N000009 HC STATISTIC AIRWAY CARE

## 2021-07-27 PROCEDURE — 92507 TX SP LANG VOICE COMM INDIV: CPT | Mod: GN | Performed by: SPEECH-LANGUAGE PATHOLOGIST

## 2021-07-27 PROCEDURE — 250N000009 HC RX 250: Performed by: INTERNAL MEDICINE

## 2021-07-27 PROCEDURE — 999N000157 HC STATISTIC RCP TIME EA 10 MIN

## 2021-07-27 PROCEDURE — 97535 SELF CARE MNGMENT TRAINING: CPT | Mod: GO | Performed by: OCCUPATIONAL THERAPIST

## 2021-07-27 PROCEDURE — 94640 AIRWAY INHALATION TREATMENT: CPT

## 2021-07-27 PROCEDURE — 94667 MNPJ CHEST WALL 1ST: CPT

## 2021-07-27 PROCEDURE — 87205 SMEAR GRAM STAIN: CPT | Performed by: INTERNAL MEDICINE

## 2021-07-27 RX ADMIN — ANORECTAL OINTMENT: 15.7; .44; 24; 20.6 OINTMENT TOPICAL at 13:02

## 2021-07-27 RX ADMIN — FAMOTIDINE 20 MG: 20 TABLET, FILM COATED ORAL at 08:50

## 2021-07-27 RX ADMIN — ANORECTAL OINTMENT: 15.7; .44; 24; 20.6 OINTMENT TOPICAL at 08:50

## 2021-07-27 RX ADMIN — ACETYLCYSTEINE 2 ML: 200 SOLUTION ORAL; RESPIRATORY (INHALATION) at 07:22

## 2021-07-27 RX ADMIN — VENLAFAXINE 37.5 MG: 37.5 TABLET ORAL at 18:16

## 2021-07-27 RX ADMIN — IPRATROPIUM BROMIDE AND ALBUTEROL SULFATE 3 ML: .5; 3 SOLUTION RESPIRATORY (INHALATION) at 01:34

## 2021-07-27 RX ADMIN — SODIUM CHLORIDE 30 MG/ML INHALATION SOLUTION 3 ML: 30 SOLUTION INHALANT at 19:26

## 2021-07-27 RX ADMIN — MIRTAZAPINE 7.5 MG: 7.5 TABLET ORAL at 21:48

## 2021-07-27 RX ADMIN — IPRATROPIUM BROMIDE AND ALBUTEROL SULFATE 3 ML: .5; 3 SOLUTION RESPIRATORY (INHALATION) at 19:26

## 2021-07-27 RX ADMIN — LISINOPRIL 40 MG: 20 TABLET ORAL at 18:16

## 2021-07-27 RX ADMIN — IPRATROPIUM BROMIDE AND ALBUTEROL SULFATE 3 ML: .5; 3 SOLUTION RESPIRATORY (INHALATION) at 13:34

## 2021-07-27 RX ADMIN — HEPARIN SODIUM 5000 UNITS: 5000 INJECTION, SOLUTION INTRAVENOUS; SUBCUTANEOUS at 05:29

## 2021-07-27 RX ADMIN — Medication 3 PACKET: at 13:01

## 2021-07-27 RX ADMIN — ANORECTAL OINTMENT: 15.7; .44; 24; 20.6 OINTMENT TOPICAL at 21:49

## 2021-07-27 RX ADMIN — VENLAFAXINE 37.5 MG: 37.5 TABLET ORAL at 08:50

## 2021-07-27 RX ADMIN — Medication 3 PACKET: at 08:50

## 2021-07-27 RX ADMIN — IPRATROPIUM BROMIDE AND ALBUTEROL SULFATE 3 ML: .5; 3 SOLUTION RESPIRATORY (INHALATION) at 07:23

## 2021-07-27 RX ADMIN — Medication 3 PACKET: at 21:48

## 2021-07-27 RX ADMIN — ACETYLCYSTEINE 2 ML: 200 SOLUTION ORAL; RESPIRATORY (INHALATION) at 19:26

## 2021-07-27 RX ADMIN — METHYLPHENIDATE HYDROCHLORIDE 10 MG: 10 TABLET ORAL at 05:29

## 2021-07-27 RX ADMIN — SODIUM CHLORIDE 30 MG/ML INHALATION SOLUTION 3 ML: 30 SOLUTION INHALANT at 07:26

## 2021-07-27 RX ADMIN — FAMOTIDINE 20 MG: 20 TABLET, FILM COATED ORAL at 21:48

## 2021-07-27 RX ADMIN — HEPARIN SODIUM 5000 UNITS: 5000 INJECTION, SOLUTION INTRAVENOUS; SUBCUTANEOUS at 13:01

## 2021-07-27 RX ADMIN — HEPARIN SODIUM 5000 UNITS: 5000 INJECTION, SOLUTION INTRAVENOUS; SUBCUTANEOUS at 21:49

## 2021-07-27 RX ADMIN — VENLAFAXINE 37.5 MG: 37.5 TABLET ORAL at 12:59

## 2021-07-27 ASSESSMENT — MIFFLIN-ST. JEOR: SCORE: 1438.25

## 2021-07-27 NOTE — PLAN OF CARE
Patient was up on the chair and tolerated more than 2 hrs. Denied and pain. Vss. Will continue on bilateral mitts for safety. Will continue to monitor.

## 2021-07-27 NOTE — PROGRESS NOTES
Social Work Note:  Patient chart reviewed.  Patient discussed in morning rounds.  Barriers to discharge: Trach. HFTM 30%/30LPM.  Mitts x2.      Pulmonary following patient.   Phase 2 wean~continous heated trach mask with hi rosemarie 30L/35% Fi02 days.  Duo-neb six times a day for pulmonary hygiene. Frequent suctioning 4x-5x.  Patient has tube feeding via PEG placed 6/8/21.    Patient being followed by PT/OT/St/RD.  Palliative Care Team  Following and supporting patient and family.  Patient's exact discharge date, time, disposition TBD.  SW will continue to follow for psychosocial and emotional support of patient and family. SW to facilitate discharge to SNF/TCU when pt no longer requires LTACH level of care.   Patient's spouse and daughter have been informed that patient will require increased supervision in the future, likely 24/7 supervision.      Dayron Chen, [facility]  [phone]

## 2021-07-27 NOTE — PLAN OF CARE
Problem: Communication Impairment (Artificial Airway)  Goal: Effective Communication  Outcome: Improving     Problem: Device-Related Complication Risk (Artificial Airway)  Goal: Optimal Device Function  Outcome: Improving     Problem: Skin and Tissue Injury (Artificial Airway)  Goal: Absence of Device-Related Skin or Tissue Injury  Outcome: Improving   RT PROGRESS NOTE     DATA:     CURRENT SETTINGS:               TRACH TYPE / SIZE: #6 BIVONA TTS Changed on 7/28/21             MODE:  HFTM/cuff up             FIO2: 30%@30 L/MIN     ACTION:             THERAPIES: Duoneb q6hr , Mucomyst / Sodium chloride neb BID , MetaNeb for 20 MIN x1               SUCTION:                           FREQUENCY: x 4                        AMOUNT: Small to large                        CONSISTENCY: thick                        COLOR:  white             SPONTANEOUS COUGH EFFORT/STRENGTH OF EFFORT (not elicited by suctioning): Fair                               WEANING PHASE: 2                        WEAN MODE: PMV  up to 4 HRS two times a day                         WEAN TIME: 7 hrs  Total, on 7/31                        END WEAN REASON:  per order      RESPONSE:             BS:  Decreased faint coarse              VITAL SIGNS: Sat  95-97%, HR 76-83, RR 20-22             EMOTIONAL NEEDS / CONCERNS:confused               RISK FOR SELF DECANNULATION: yes                        RISK DUE TO:  Confused                         INTERVENTION/S IN PLACE IS/ARE: one hand  mitten       NOTE / PLAN:    Cont current care plan

## 2021-07-27 NOTE — PLAN OF CARE
Problem: Communication Impairment (Artificial Airway)  Goal: Effective Communication  Outcome: Improving     Problem: Device-Related Complication Risk (Artificial Airway)  Goal: Optimal Device Function  Outcome: Improving     Problem: Skin and Tissue Injury (Artificial Airway)  Goal: Absence of Device-Related Skin or Tissue Injury  Outcome: Improving     RT PROGRESS NOTE     DATA:     CURRENT SETTINGS:             TRACH TYPE / SIZE:  #7 Bivona placed on 7/5/21             MODE:   HFTM (Cuff up)             FIO2:   30% and 30 lpm     ACTION:             THERAPIES:   DUO NEB Q6             SUCTION:                           FREQUENCY:  X4                        AMOUNT:   Large to moderate                        CONSISTENCY:   Thick                        COLOR:   White/yellow             SPONTANEOUS COUGH EFFORT/STRENGTH OF EFFORT (not elicited by suctioning): Yes:Strong                              WEANING PHASE: #2                        WEAN MODE:   HFTM as reji                        WEAN TIME:                           END WEAN REASON:  Cont     RESPONSE:             BS:   Coarse             VITAL SIGNS: SAT 94-97%, HR 82-89 , RR 20-22             EMOTIONAL NEEDS / CONCERNS:  Confuse               RISK FOR SELF DECANNULATION:  Yes                        RISK DUE TO:  Restless                        INTERVENTION/S IN PLACE IS/ARE: Bilateral mitts       NOTE / PLAN:  Pt is on hftm 30% and 30 lpm with cuff up, reji well. Sputum culture done. Cont current therapy and keep sat >/=90%

## 2021-07-27 NOTE — PROGRESS NOTES
[facility]    Medicine Progress Note - Hospitalist Service       Date of Admission:  6/11/2021    Summary:  Dayron Neri is a 70 year male with h/o HTN who presented to ED on 5/15/2021 with acute SAH after a fall with unresponsiveness.  He underwent emergent left external ventricular drain placement.  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  On 5/28/2021, patient had persistent cerebral vasospasm, CT head showed bilateral BAYLEE infarct and was treated with milrinone and verapamil.  On 6/1/2021, angiogram showed no evidence of vasospasm.  Extubated on 6/3/2021.  6/6/2021 he was reintubated due to hypoxia.  On 6/7/2021, patient underwent tracheostomy and PEG tube placement.  EVD removed 6/9/2021.   Patient was treated for possible pneumonia with Unasyn on 5/20/2021, It was switched to ceftriaxone the next day for sputum culture growing Klebsiella. On 5/23, sputum culture also grew Pseudomonas. Antibiotic was switched to Zosyn.  Due to increased WBCs in CSF, possibility of ventriculitis was raised. CSF culture was negative. Antibiotic was switched to cefepime and vancomycin on 5/28/2021 for 2 weeks.  Vanco was discontinued on 6/9/2021.  On 6/5/2021, sputum grew ESBL so cefepime was switched to meropenem. He was transferred to [facility] on 6/11/21.       Assessment & Plan           Acute on chronic hypoxemic respiratory failure: S/p treatment pseudomonas ESBL and klebsiella pneumonia. Weaning has been slow and the biggest barrier for decannulation is excessive secretions  Glycopyrrolate was tried and stopped.  Also diuresed him with Lasix for 3 doses with some improvement however that improvement seems to have stabilized or plateaued.  Speaking valve is being attempted in short trials but does aspirate with blue dye test so needs to be only with speech/RT present.  Currently on phase 2 weaning with TM/PMV during day and is  off the vent at night the last few days.   - Continue to wean per RT and pulmonary.   - Continue  metaneb, acetylcysteine, and 3% saline nebs   - Continue Scopolamine (started 7/20/2021)    Traumatic brain injury/intracranial hemorrhage: Effexor was started at 25 mg 3 times a day and was uptitrated 7/20/21 to 37.53 times a day.  Will need follow up with Dr. Martinez with Neurosurgery in 3 months with repeat CTA of head and neck for post-op f/u.    Ventilator associated pneumonia: Completed antibiotics on July 18.    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 on Tobramycin nebs 6/24/2021 - 7/9/2021.   - Continue pulmonary toilet, scheduled duonebs    Dysphagia: Patient failed the blue dye test 7/20/21 with immediate aspiration, continue to use tube feeds. Speech therapy following.     Acute metabolic encephalopathy: improved.   - Continue Ritalin      Anemia: hemoglobin stable. Continue to monitor.     Essential Hypertension: BP controlled. On lisinopril    Severe debility, weakness, critical illness, deconditioning, Continue PT/OT    Hypercalcemia - unclear etiology, mild at this point. May be related to relative immobilization as he is walking with therapy but only 1-2 times a day. Monitor.    Insomnia: is on Ritalin which was decreased to once daily due to insomnia when taken later in day.    - Continue nighttime mirtazapine 7.5 mg scheduled.       Diet: Adult Formula Drip Feeding: Continuous Osmolite 1.5; Gastrostomy; Goal Rate: 90; mL/hr; Medication - Feeding Tube Flush Frequency: 240 mL q 4 hours water flushes; Amount to Send (Nutrition use): send 9 packets Nutrisource Fiber daily; Tube Feeding...    DVT Prophylaxis: Heparin SQ  Gutierrez Catheter: Not present  Central Lines: None  Code Status: Full Code      Disposition Plan   Expected discharge: to be determined    The patient's care was discussed with the Bedside Nurse and Care Coordinator/Social  Worker.    Yazmin Hooper MD  Hospitalist Service  [facility] [place] [address]  Securely message with the [employer] Web Console (learn more here)  Text page via [employer] Paging/Directory      ______________________________________________________________________    Interval History   Patient shows improvement when he worked with speech therapist today. His family was on the video watching him.    Data reviewed today: I reviewed all medications, new labs and imaging results over the last 24 hours.     Physical Exam   Vital Signs: Temp: 98.3  F (36.8  C) Temp src: Axillary BP: 108/68 Pulse: 85   Resp: 22 SpO2: 97 % O2 Device: Trach dome Oxygen Delivery: 30 LPM  Weight: 142 lbs 8 oz    General appearance: not in acute distress  HEENT: PERRL, EOMI  Lungs: Clear breath sounds in bilateral lung fields  Cardiovascular: Regular rate and rhythm, normal S1-S2  Abdomen: Soft, non tender, no distension  Musculoskeletal: No joint swelling  Skin: No rash and no edema  Neurology: AAO ×3.  Cranial nerves II - XII normal.  Moves bilateral arms. Moves left foot on command.    Data   Recent Labs   Lab 07/26/21  0614 07/25/21  0705 07/23/21  0627 07/22/21  0700 07/20/21  1449   WBC  --  10.4  --  8.5 12.0*   HGB  --  10.5*  --  10.3*  --    MCV  --  98  --  97  --    PLT  --  489*  --  499*  --      --  141 140  --    POTASSIUM 4.4  --  4.3 4.4  --    CHLORIDE 98  --  101 100  --    CO2 29  --  30 30  --    BUN 27*  --  21 24*  --    CR 0.72  --  0.63* 0.63*  --    ANIONGAP 12  --  10 10  --    RAGINI 10.7* 10.8* 10.9* 10.6*  --    *  --  101 120  --

## 2021-07-27 NOTE — PROGRESS NOTES
Pulmonary Medicine Follow up Note - Ventilator Rounds:    Clinical status discussed today with respiratory therapist.    Chief complaint: Ongoing respiratory failure  Significant change in clinical status during past 24 hours? -  Still with quite a lot of secretions.     FiO2 (%): 30% 35 L/min HFTM  Resp: 22    Tracheal secretions: o/n x4 for moderate to large thick white/yellow   Current phase of ventilator weaning pathway:  Phase 2  Ventilator weaning results from yesterday: 24 hours trach mask with HFTM 35 L/min 30%, was able to do PMV for 90 minutes    Current Facility-Administered Medications   Medication     acetaminophen (TYLENOL) solution 650 mg    Or     acetaminophen (TYLENOL) tablet 650 mg     acetylcysteine (MUCOMYST) 20 % nebulizer solution 2 mL     alteplase (CATHFLO ACTIVASE) injection 2 mg     bisacodyl (DULCOLAX) Suppository 10 mg     dextrose 50 % injection 25-50 mL    Or     glucagon injection 1 mg     docusate (COLACE) 50 MG/5ML liquid 100 mg    Or     docusate sodium (COLACE) capsule 100 mg     famotidine (PEPCID) tablet 20 mg     fiber modular (NUTRISOURCE FIBER) packet 3 packet     heparin ANTICOAGULANT injection 5,000 Units     hydrALAZINE (APRESOLINE) injection 10 mg     ipratropium - albuterol 0.5 mg/2.5 mg/3 mL (DUONEB) neb solution 3 mL     lisinopril (ZESTRIL) tablet 40 mg     loperamide (IMODIUM) 1 MG/7.5ML liquid 1 mg     magnesium hydroxide (MILK OF MAGNESIA) suspension 30 mL     menthol-zinc oxide (CALMOSEPTINE) 0.44-20.6 % ointment OINT     methylphenidate (RITALIN) tablet 10 mg     mirtazapine (REMERON) tablet TABS 7.5 mg     naloxone (NARCAN) injection 0.2 mg    Or     naloxone (NARCAN) injection 0.4 mg    Or     naloxone (NARCAN) injection 0.2 mg    Or     naloxone (NARCAN) injection 0.4 mg     ondansetron (ZOFRAN) solution 4 mg     polyethylene glycol (MIRALAX) Packet 17 g     scopolamine (TRANSDERM) 72 hr patch 1 patch    And     scopolamine (TRANSDERM-SCOP) Patch in Place      "sodium chloride (NEBUSAL) 3 % neb solution 3 mL     sodium chloride (PF) 0.9% PF flush 3 mL     sodium chloride 0.9% infusion     venlafaxine (EFFEXOR) tablet 37.5 mg       Physical exam     /82 (BP Location: Left arm)   Pulse 83   Temp 98.7  F (37.1  C) (Oral)   Resp 22   Ht 1.803 m (5' 10.98\")   Wt 64.6 kg (142 lb 8 oz)   SpO2 95%   BMI 19.88 kg/m      Gen: eyes open, appears chronically ill  HEENT: pale conjunctiva, moist mucosa  Neck: s/p trach   Lungs: gurgling upper airway sounds transmitted to lower airways  CV: regular, no murmurs or gallops appreciated  Abdomen: soft, NT, BS wnl  Ext: no edema  Neuro: eyes open, trying to vocalize, generalized weakness    Sodium   Date Value Ref Range Status   07/26/2021 139 136 - 145 mmol/L Final   06/11/2021 143 133 - 144 mmol/L Final     Potassium   Date Value Ref Range Status   07/26/2021 4.4 3.5 - 5.0 mmol/L Final   06/11/2021 3.8 3.4 - 5.3 mmol/L Final     Chloride   Date Value Ref Range Status   07/26/2021 98 98 - 107 mmol/L Final   06/11/2021 106 94 - 109 mmol/L Final     Carbon Dioxide   Date Value Ref Range Status   06/11/2021 36 (H) 20 - 32 mmol/L Final     Carbon Dioxide (CO2)   Date Value Ref Range Status   07/26/2021 29 22 - 31 mmol/L Final     Anion Gap   Date Value Ref Range Status   07/26/2021 12 5 - 18 mmol/L Final   06/11/2021 2 (L) 3 - 14 mmol/L Final     Glucose   Date Value Ref Range Status   07/26/2021 132 (H) 70 - 125 mg/dL Final   06/11/2021 142 (H) 70 - 99 mg/dL Final     Urea Nitrogen   Date Value Ref Range Status   07/26/2021 27 (H) 8 - 22 mg/dL Final   06/11/2021 25 7 - 30 mg/dL Final     Creatinine   Date Value Ref Range Status   07/26/2021 0.72 0.70 - 1.30 mg/dL Final   06/11/2021 0.57 (L) 0.66 - 1.25 mg/dL Final     GFR Estimate   Date Value Ref Range Status   07/26/2021 >90 >60 mL/min/1.73m2 Final     Comment:     As of July 11, 2021, eGFR is calculated by the CKD-EPI creatinine equation, without race adjustment. eGFR can be " influenced by muscle mass, exercise, and diet. The reported eGFR is an estimation only and is only applicable if the renal function is stable.   07/09/2021 >60 >60 mL/min/1.73m2 Final   06/11/2021 >90 >60 mL/min/[1.73_m2] Final     Comment:     Non  GFR Calc  Starting 12/18/2018, serum creatinine based estimated GFR (eGFR) will be   calculated using the Chronic Kidney Disease Epidemiology Collaboration   (CKD-EPI) equation.       Calcium   Date Value Ref Range Status   07/26/2021 10.7 (H) 8.5 - 10.5 mg/dL Final   06/11/2021 9.6 8.5 - 10.1 mg/dL Final       XR CHEST PORT 1 VIEW  LOCATION: [facility]  DATE/TIME: 7/15/2021 7:50 AM  INDICATION: New VAP followup.  COMPARISON: 07/09/2021.  IMPRESSION:   No change in tracheostomy tube. Heart and mediastinal size are stable. There is indistinctness of the pulmonary interstitium, representing either airway inflammation or edema. There is bandlike opacity involving the retrocardiac region of the left lung base, increased from prior study and representing either atelectasis or infectious process. No pleural effusion or pneumothorax. There is some mild elevation of the right hemidiaphragm.    Diagnosis:     Patient is a 71 yo man with history of hypertension. Patient initially presented on 15-May-2021 to [facility] after being found unresponsive by his wife. Patient was then found to have a subarachnoid hemorrhage secondary to a ruptured aneurysm. Patient was intubated and was started on mannitol, IV antihypertensives and hyperventilation and patient was then transferred to [facility]. Patient underwent aneurysm clipping as well as placement of extra-ventricular drain from hydrocephalus on 15-May-2021. Extra-ventricular drain would malfunction and would require replacement on 04-Jun-2021 and 06-Jun-2021. Extra-ventricular drain reportedly was removed on 09-Jun-2021. Patient had intra-arterial vasospasm treatment  with verapamil on 28-May-2021.  Patient was extubated on 16-May-2021 but had to be reintubated on 19-May-2021. Patient would be extubated on 03-Jun-2021 but would be reintubated on 06-Jun-2021. Patient had tracheostomy and PEG-tube placed on 07-Jun-2021. Patient required treatment for hospital-acquired pneumonia. Patient was transferred to [facility] on 11-Jun-2021.    1. Acute respiratory failure s/p trach 6/7/2021  2. S/p treatment pseudomonas ESBL and klebsiella pneumonia   3. Encephalopathy   4. Subarachnoid bleed s/p craniotomy and clipping P-comm rupture aneurysm.   5. HTN  6. Malnourishment   7. Dysphagia s/p G tube    Recommendations/Plans ([facility]):  1. Weaning orders: continue phase 2 with trials of PMV as tolerated  2. Last trach change: #7 bivona on 7/5  3. Downsize from #7 bivona to #6 bivona AM of 7/28 repeat BDT on 7/28 then repeat BDT; had immediate aspiration of blue dye after BDT on 7/20  4. Diagnostic testing? CXR on 7/26 with right basilar scarring likely from prior aspiration, no obvious acute infiltrate, PCT low, tracheal aspirate culture from 7/26 in process  5. Continue scopolamine patch for now but watch for secretion inspissation, anticholinergic neurologic side effects; would not increase dose  6. Continue scheduled ipratopium/albuterol nebs; metaneb, acetylcysteine, and 3% saline nebs BID added on 7/26 and seem to be helping somewhat  7. DVT prophylaxis: heparin subcutaneous  8. Continue to address goals of care; appreciate palliative care; overall prognosis for vent liberation is guarded  9. Discussed with RT    Jamin Davis MD  Pulmonary and Critical Care Medicine  [facility]  Cell [phone]  Pager [phone]  Office [phone]  (he/him/his)

## 2021-07-27 NOTE — PLAN OF CARE
Problem: Anxiety  Goal: Anxiety Reduction or Resolution  Outcome: Improving     Problem: Skin and Tissue Injury (Artificial Airway)  Goal: Absence of Device-Related Skin or Tissue Injury  Outcome: Improving   Vital signs were stable. Pt. does not appear to be in pain. Pt. slept most of the shift. Pt. is still on restraints for safety. Pt. was repositioned every 2 hours. Continue to monitor.  Margot Sargent RN

## 2021-07-28 ENCOUNTER — APPOINTMENT (OUTPATIENT)
Dept: PHYSICAL THERAPY | Facility: CLINIC | Age: 71
DRG: 207 | End: 2021-07-28
Attending: HOSPITALIST
Payer: COMMERCIAL

## 2021-07-28 ENCOUNTER — APPOINTMENT (OUTPATIENT)
Dept: SPEECH THERAPY | Facility: CLINIC | Age: 71
DRG: 207 | End: 2021-07-28
Attending: INTERNAL MEDICINE
Payer: COMMERCIAL

## 2021-07-28 ENCOUNTER — APPOINTMENT (OUTPATIENT)
Dept: OCCUPATIONAL THERAPY | Facility: CLINIC | Age: 71
DRG: 207 | End: 2021-07-28
Attending: HOSPITALIST
Payer: COMMERCIAL

## 2021-07-28 PROBLEM — Z86.19 HISTORY OF MDR PSEUDOMONAS AERUGINOSA INFECTION: Status: ACTIVE | Noted: 2021-07-28

## 2021-07-28 LAB
ANION GAP SERPL CALCULATED.3IONS-SCNC: 11 MMOL/L (ref 5–18)
BASOPHILS # BLD AUTO: 0 10E3/UL (ref 0–0.2)
BASOPHILS NFR BLD AUTO: 0 %
BUN SERPL-MCNC: 29 MG/DL (ref 8–22)
CALCIUM SERPL-MCNC: 10.9 MG/DL (ref 8.5–10.5)
CHLORIDE BLD-SCNC: 97 MMOL/L (ref 98–107)
CO2 SERPL-SCNC: 31 MMOL/L (ref 22–31)
CREAT SERPL-MCNC: 0.74 MG/DL (ref 0.7–1.3)
EOSINOPHIL # BLD AUTO: 0.2 10E3/UL (ref 0–0.7)
EOSINOPHIL NFR BLD AUTO: 2 %
ERYTHROCYTE [DISTWIDTH] IN BLOOD BY AUTOMATED COUNT: 14.6 % (ref 10–15)
GFR SERPL CREATININE-BSD FRML MDRD: >90 ML/MIN/1.73M2
GLUCOSE BLD-MCNC: 148 MG/DL (ref 70–125)
HCT VFR BLD AUTO: 32.1 % (ref 40–53)
HGB BLD-MCNC: 10.3 G/DL (ref 13.3–17.7)
IMM GRANULOCYTES # BLD: 0.1 10E3/UL
IMM GRANULOCYTES NFR BLD: 1 %
LYMPHOCYTES # BLD AUTO: 1.1 10E3/UL (ref 0.8–5.3)
LYMPHOCYTES NFR BLD AUTO: 10 %
MCH RBC QN AUTO: 31.6 PG (ref 26.5–33)
MCHC RBC AUTO-ENTMCNC: 32.1 G/DL (ref 31.5–36.5)
MCV RBC AUTO: 99 FL (ref 78–100)
MONOCYTES # BLD AUTO: 0.9 10E3/UL (ref 0–1.3)
MONOCYTES NFR BLD AUTO: 9 %
NEUTROPHILS # BLD AUTO: 8 10E3/UL (ref 1.6–8.3)
NEUTROPHILS NFR BLD AUTO: 78 %
NRBC # BLD AUTO: 0 10E3/UL
NRBC BLD AUTO-RTO: 0 /100
PLATELET # BLD AUTO: 428 10E3/UL (ref 150–450)
POTASSIUM BLD-SCNC: 4.2 MMOL/L (ref 3.5–5)
RBC # BLD AUTO: 3.26 10E6/UL (ref 4.4–5.9)
SARS-COV-2 RNA RESP QL NAA+PROBE: NEGATIVE
SODIUM SERPL-SCNC: 139 MMOL/L (ref 136–145)
WBC # BLD AUTO: 10.2 10E3/UL (ref 4–11)

## 2021-07-28 PROCEDURE — 80048 BASIC METABOLIC PNL TOTAL CA: CPT | Performed by: HOSPITALIST

## 2021-07-28 PROCEDURE — 97530 THERAPEUTIC ACTIVITIES: CPT | Mod: GP | Performed by: PHYSICAL THERAPIST

## 2021-07-28 PROCEDURE — 99207 PR CDG-CODE CATEGORY CHANGED: CPT | Performed by: INTERNAL MEDICINE

## 2021-07-28 PROCEDURE — 94660 CPAP INITIATION&MGMT: CPT

## 2021-07-28 PROCEDURE — 99233 SBSQ HOSP IP/OBS HIGH 50: CPT | Performed by: INTERNAL MEDICINE

## 2021-07-28 PROCEDURE — 92507 TX SP LANG VOICE COMM INDIV: CPT | Mod: GN | Performed by: SPEECH-LANGUAGE PATHOLOGIST

## 2021-07-28 PROCEDURE — 94664 DEMO&/EVAL PT USE INHALER: CPT

## 2021-07-28 PROCEDURE — 250N000013 HC RX MED GY IP 250 OP 250 PS 637: Performed by: HOSPITALIST

## 2021-07-28 PROCEDURE — 250N000013 HC RX MED GY IP 250 OP 250 PS 637: Performed by: INTERNAL MEDICINE

## 2021-07-28 PROCEDURE — 87635 SARS-COV-2 COVID-19 AMP PRB: CPT | Performed by: INTERNAL MEDICINE

## 2021-07-28 PROCEDURE — 92526 ORAL FUNCTION THERAPY: CPT | Mod: GN | Performed by: SPEECH-LANGUAGE PATHOLOGIST

## 2021-07-28 PROCEDURE — 97110 THERAPEUTIC EXERCISES: CPT | Mod: GP | Performed by: PHYSICAL THERAPIST

## 2021-07-28 PROCEDURE — 36415 COLL VENOUS BLD VENIPUNCTURE: CPT | Performed by: HOSPITALIST

## 2021-07-28 PROCEDURE — 94640 AIRWAY INHALATION TREATMENT: CPT | Mod: 76

## 2021-07-28 PROCEDURE — 250N000013 HC RX MED GY IP 250 OP 250 PS 637

## 2021-07-28 PROCEDURE — 94640 AIRWAY INHALATION TREATMENT: CPT

## 2021-07-28 PROCEDURE — 999N000009 HC STATISTIC AIRWAY CARE

## 2021-07-28 PROCEDURE — 250N000011 HC RX IP 250 OP 636

## 2021-07-28 PROCEDURE — 94668 MNPJ CHEST WALL SBSQ: CPT

## 2021-07-28 PROCEDURE — 120N000017 HC R&B RESPIRATORY CARE

## 2021-07-28 PROCEDURE — 31600 PLANNED TRACHEOSTOMY: CPT

## 2021-07-28 PROCEDURE — 85025 COMPLETE CBC W/AUTO DIFF WBC: CPT | Performed by: INTERNAL MEDICINE

## 2021-07-28 PROCEDURE — 250N000009 HC RX 250: Performed by: INTERNAL MEDICINE

## 2021-07-28 PROCEDURE — 94003 VENT MGMT INPAT SUBQ DAY: CPT | Performed by: INTERNAL MEDICINE

## 2021-07-28 PROCEDURE — 999N000157 HC STATISTIC RCP TIME EA 10 MIN

## 2021-07-28 PROCEDURE — 97530 THERAPEUTIC ACTIVITIES: CPT | Mod: GO | Performed by: OCCUPATIONAL THERAPIST

## 2021-07-28 RX ADMIN — VENLAFAXINE 37.5 MG: 37.5 TABLET ORAL at 17:20

## 2021-07-28 RX ADMIN — Medication 3 PACKET: at 20:25

## 2021-07-28 RX ADMIN — VENLAFAXINE 37.5 MG: 37.5 TABLET ORAL at 13:35

## 2021-07-28 RX ADMIN — SODIUM CHLORIDE 30 MG/ML INHALATION SOLUTION 3 ML: 30 SOLUTION INHALANT at 07:02

## 2021-07-28 RX ADMIN — IPRATROPIUM BROMIDE AND ALBUTEROL SULFATE 3 ML: .5; 3 SOLUTION RESPIRATORY (INHALATION) at 19:43

## 2021-07-28 RX ADMIN — ANORECTAL OINTMENT: 15.7; .44; 24; 20.6 OINTMENT TOPICAL at 13:37

## 2021-07-28 RX ADMIN — METHYLPHENIDATE HYDROCHLORIDE 10 MG: 10 TABLET ORAL at 05:26

## 2021-07-28 RX ADMIN — IPRATROPIUM BROMIDE AND ALBUTEROL SULFATE 3 ML: .5; 3 SOLUTION RESPIRATORY (INHALATION) at 02:00

## 2021-07-28 RX ADMIN — VENLAFAXINE 37.5 MG: 37.5 TABLET ORAL at 08:21

## 2021-07-28 RX ADMIN — Medication 3 PACKET: at 13:35

## 2021-07-28 RX ADMIN — ANORECTAL OINTMENT: 15.7; .44; 24; 20.6 OINTMENT TOPICAL at 20:24

## 2021-07-28 RX ADMIN — FAMOTIDINE 20 MG: 20 TABLET, FILM COATED ORAL at 20:24

## 2021-07-28 RX ADMIN — Medication 3 PACKET: at 08:21

## 2021-07-28 RX ADMIN — LISINOPRIL 40 MG: 20 TABLET ORAL at 20:23

## 2021-07-28 RX ADMIN — IPRATROPIUM BROMIDE AND ALBUTEROL SULFATE 3 ML: .5; 3 SOLUTION RESPIRATORY (INHALATION) at 14:20

## 2021-07-28 RX ADMIN — ACETYLCYSTEINE 2 ML: 200 SOLUTION ORAL; RESPIRATORY (INHALATION) at 07:02

## 2021-07-28 RX ADMIN — HEPARIN SODIUM 5000 UNITS: 5000 INJECTION, SOLUTION INTRAVENOUS; SUBCUTANEOUS at 05:26

## 2021-07-28 RX ADMIN — FAMOTIDINE 20 MG: 20 TABLET, FILM COATED ORAL at 08:21

## 2021-07-28 RX ADMIN — ACETYLCYSTEINE 2 ML: 200 SOLUTION ORAL; RESPIRATORY (INHALATION) at 19:43

## 2021-07-28 RX ADMIN — ANORECTAL OINTMENT: 15.7; .44; 24; 20.6 OINTMENT TOPICAL at 08:22

## 2021-07-28 RX ADMIN — MIRTAZAPINE 7.5 MG: 7.5 TABLET ORAL at 20:22

## 2021-07-28 RX ADMIN — SODIUM CHLORIDE 30 MG/ML INHALATION SOLUTION 3 ML: 30 SOLUTION INHALANT at 19:43

## 2021-07-28 RX ADMIN — HEPARIN SODIUM 5000 UNITS: 5000 INJECTION, SOLUTION INTRAVENOUS; SUBCUTANEOUS at 13:35

## 2021-07-28 RX ADMIN — HEPARIN SODIUM 5000 UNITS: 5000 INJECTION, SOLUTION INTRAVENOUS; SUBCUTANEOUS at 22:03

## 2021-07-28 RX ADMIN — IPRATROPIUM BROMIDE AND ALBUTEROL SULFATE 3 ML: .5; 3 SOLUTION RESPIRATORY (INHALATION) at 07:02

## 2021-07-28 ASSESSMENT — MIFFLIN-ST. JEOR: SCORE: 1450.95

## 2021-07-28 NOTE — PLAN OF CARE
Problem: Restraint for Non-Violent/Non-Self-Destructive Behavior  Goal: Prevent/Manage Potential Problems  Description: Maintain safety of patient and others during period of restraint.  Promote psychological and physical wellbeing.  Prevent injury to skin and involved body parts.  Promote nutrition, hydration, and elimination.  Outcome: No Change   Still on mitts x 2 for safety. Trach in place, condom cath patent.   Problem: Anxiety  Goal: Anxiety Reduction or Resolution  Outcome: Improving   No signs of anxiety noted. Pt was sleeping on & off [id number] shift. No anxiety noted. Resting in bed at this time.

## 2021-07-28 NOTE — PROGRESS NOTES
[facility]    Medicine Progress Note - Hospitalist Service       Date of Admission:  6/11/2021    Summary:  Dayron Neri is a 70 year male with h/o HTN who presented to ED on 5/15/2021 with acute SAH after a fall with unresponsiveness.  He underwent emergent left external ventricular drain placement.  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  On 5/28/2021, patient had persistent cerebral vasospasm, CT head showed bilateral BAYLEE infarct and was treated with milrinone and verapamil.  On 6/1/2021, angiogram showed no evidence of vasospasm.  Extubated on 6/3/2021.  6/6/2021 he was reintubated due to hypoxia.  On 6/7/2021, patient underwent tracheostomy and PEG tube placement.  EVD removed 6/9/2021.   Patient was treated for possible pneumonia with Unasyn on 5/20/2021, It was switched to ceftriaxone the next day for sputum culture growing Klebsiella. On 5/23, sputum culture also grew Pseudomonas. Antibiotic was switched to Zosyn.  Due to increased WBCs in CSF, possibility of ventriculitis was raised. CSF culture was negative. Antibiotic was switched to cefepime and vancomycin on 5/28/2021 for 2 weeks.  Vanco was discontinued on 6/9/2021.  On 6/5/2021, sputum grew ESBL so cefepime was switched to meropenem. He was transferred to [facility] on 6/11/21.       Assessment & Plan           Acute on chronic hypoxemic respiratory failure: S/p treatment pseudomonas ESBL and klebsiella pneumonia. Weaning has been slow and the biggest barrier for decannulation is excessive secretions  Glycopyrrolate was tried and stopped.  Also diuresed him with Lasix for 3 doses with some improvement however that improvement seems to have stabilized or plateaued.  Speaking valve is being attempted in short trials but does aspirate with blue dye test so needs to be only with speech/RT present.  Currently on phase 2 weaning with TM/PMV during day and is  off the vent at night.   - Continue to wean per RT and pulmonary.   - Continue  metaneb, acetylcysteine, and 3% saline nebs   - Continue Scopolamine (started 7/20/2021)    Ventilator associated pneumonia: Completed antibiotics on July 18.    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 sputum culture 6/19 showed MDR pseudomonas. On Tobramycin nebs 6/24/2021 - 7/9/2021.   - Continue pulmonary toilet, scheduled duonebs    Traumatic brain injury/intracranial hemorrhage: 6/14/21 CT head done for fatigue and read as slight increase in caliber of lateral and third ventricle with possibility of developing communicating hydrocephalus. Discussed with Dr. Casillas (neurosurgeon at [facility]), who did not think there was much change. CT head repeated on 6/16/21, which did not show any change.  - Need follow up with Dr. Martinez with Neurosurgery in 3 months with repeat CTA of head and neck for post-op f/u.    Dysphagia: Patient failed the blue dye test 7/20/21 with immediate aspiration, continue to use tube feeds. Speech therapy following.     Acute metabolic encephalopathy: improved.   - Continue Ritalin and effexor     Anemia: hemoglobin stable. Continue to monitor.     Essential hypertension: BP controlled. On lisinopril    Severe debility, weakness, critical illness, deconditioning, Continue PT/OT    Hypercalcemia - unclear etiology, mild at this point. Parathyroid hormone normal. May be related to relative immobilization as he is walking with therapy but only 1-2 times a day. Monitor.    Insomnia: is on Ritalin which was decreased to once daily due to insomnia when taken later in day.    - Continue nighttime mirtazapine 7.5 mg scheduled.       Diet: Adult Formula Drip Feeding: Continuous Osmolite 1.5; Gastrostomy; Goal Rate: 90; mL/hr; Medication - Feeding Tube Flush Frequency: 240 mL q 4 hours water flushes; Amount to Send (Nutrition use): send 9 packets Nutrisource  Fiber daily; Tube Feeding...    DVT Prophylaxis: Heparin SQ  Gutierrez Catheter: Not present  Central Lines: None  Code Status: Full Code      Disposition Plan   Expected discharge: to be determined    The patient's care was discussed with the Bedside Nurse and Care Coordinator/.    Yazmin Hooper MD  Hospitalist [facility]  Securely message with the Vocera Web Console (learn more here)  Text page via [employer] Paging/Directory      ______________________________________________________________________    Interval History   Patient was working with PT this morning when seen and examined. He was able to stand with two people assistance. His wife and daughter were watching him via the video. I updated them the plan of care.     Data reviewed today: I reviewed all medications, new labs and imaging results over the last 24 hours.     Physical Exam   Vital Signs: Temp: 98.6  F (37  C) Temp src: Oral BP: 132/78 Pulse: 92   Resp: 20 SpO2: 95 % O2 Device: Trach dome Oxygen Delivery: 30 LPM  Weight: 145 lbs 4.8 oz    General appearance: not in acute distress  HEENT: PERRL, EOMI  Lungs: Clear breath sounds in bilateral lung fields  Cardiovascular: Regular rate and rhythm, normal S1-S2  Abdomen: Soft, non tender, no distension  Musculoskeletal: No joint swelling  Skin: No rash and no edema  Neurology: AAOx3, Cranial nerves II - XII normal. Moves bilateral arms. Able to lift bilateral legs off the floor.    Data   Recent Labs   Lab 07/28/21  0601 07/26/21  0614 07/25/21  0705 07/23/21  0627 07/22/21  0700   WBC 10.2  --  10.4  --  8.5   HGB 10.3*  --  10.5*  --  10.3*   MCV 99  --  98  --  97     --  489*  --  499*    139  --  141 140   POTASSIUM 4.2 4.4  --  4.3 4.4   CHLORIDE 97* 98  --  101 100   CO2 31 29  --  30 30   BUN 29* 27*  --  21 24*   CR 0.74 0.72  --  0.63* 0.63*   ANIONGAP 11 12  --  10 10   RAGINI 10.9* 10.7* 10.8* 10.9* 10.6*   * 132*  --  101 120        Restraint:    Patient pulls his IV line and trach.     Within an hour after restraint an in person face to face assessment was completed at 9:30 AM, including an evaluation of the patient's immediate reaction to the intervention, behavioral assessment and review/assessment of history, drugs and medications, recent labs, etc., and behavioral condition.  The patient experienced: No adverse physical outcome from seclusion/restraint initiation.  The intervention of restraint or seclusion needs to continue.

## 2021-07-28 NOTE — PLAN OF CARE
Problem: Anxiety  Goal: Anxiety Reduction or Resolution  Outcome: No Change       Slept more than 6 hours the whole night.      Problem: Restraint for Non-Violent/Non-Self-Destructive Behavior  Goal: Prevent/Manage Potential Problems  Description: Maintain safety of patient and others during period of restraint.  Promote psychological and physical wellbeing.  Prevent injury to skin and involved body parts.  Promote nutrition, hydration, and elimination.  Outcome: No Change       Bilateral mitts in place, monitored every two hours.        Problem: Adult Inpatient Plan of Care  Goal: Absence of Hospital-Acquired Illness or Injury  Intervention: Identify and Manage Fall Risk  Recent Flowsheet Documentation  Taken 7/28/2021 0105 by Kristal Jaramillo, RN  Safety Promotion/Fall Prevention:   bed alarm on   room door open   room near nurse's station   safety round/check completed     Problem: Adult Inpatient Plan of Care  Goal: Absence of Hospital-Acquired Illness or Injury  Intervention: Identify and Manage Fall Risk  Recent Flowsheet Documentation  Taken 7/28/2021 0105 by Kristal Jaramillo, RN  Safety Promotion/Fall Prevention:   bed alarm on   room door open   room near nurse's station   safety round/check completed

## 2021-07-28 NOTE — PROGRESS NOTES
Blue dye test and speech daily progress note     07/28/21 0932   Signing Clinician's Name / Credentials   Signing clinician's name / credentials David Coffman MA, CCC-SLP   Quick Adds   Rehab Discipline SLP   SLP - Communication   Minutes of Treatment 10 minutes   Treatment Detail Consult with RT prior to session.  RT reports trach downsize to #6 Bivona, with subsequent placement of speaking valve.  Patient speaking notable for aphonia, at times just mouthing, at times a whisper only.  Patient was able to produce very soft voice approximately 10% of the time with moderate to max verbal cues, tactile cues, manual pressure to the diaphragm to facilitate improved respiratory effort.  Minimal benefit noted   SLP - Dysphagia/Swallow   Treatment Detail Orders received for repeat blue dye test. Blue dye test was administered with speaking valve in place.  Patient required moderate cues, verbal and tactile, to initiate swallow.  Upon tracheal suctioning following swallow, by RT, no aspiration of secretions wereTo improve swallow frequency and effectiveness, effortful swallow exercise was implemented.  Patient produced 14 volitional dry swallows, with mild to max cues, including verbal prompts, model, tactile prompts, articulatory placement cues, moist swab to the lips and tongue and pressure to the tongue, verbal cues to lick lips, verbal cues for volitional throat clear.  Patient required some combination of all of these to produce above swallows. noted.   SLP Discharge Planning    SLP Discharge Recommendation (DC Rec) Transitional Care Facility   SLP Rationale for DC Rec Patient is well below his cognitive-linguistic baseline.   SLP Brief overview of current status  SLP: Repeat blue dye test completed with no blue dye suctioned, suggesting no gross immediate aspiration of secretions.  Continues to be at risk for aspiration of secretions given infrequency of swallowing.  But improved from previous blue dye test.   Decreased number of swallows were elicited during treatment today, significantly impacted by decreased attention and initiation.  Continues to be aphonic primarily, even with speaking valve in place.  Appears to be more related to cognition and initiation then adequate airflow.   Additional Documentation   SLP Plan SLP: Effortful swallow exercises and voicing.   Total Session Time   Total Session Time (minutes) 40 minutes

## 2021-07-28 NOTE — PROGRESS NOTES
Blue dye test and daily progress note     07/28/21 0932   Signing Clinician's Name / Credentials   Signing clinician's name / credentials David Coffman MA, CCC-SLP   Quick Adds   Rehab Discipline SLP   SLP - Communication   Minutes of Treatment 10 minutes   Treatment Detail Consult with RT prior to session.  RT reports trach downsize to #6 Bivona, with subsequent placement of speaking valve.  Patient speaking notable for aphonia, at times just mouthing, at times a whisper only.  Patient was able to produce very soft voice approximately 10% of the time with moderate to max verbal cues, tactile cues, manual pressure to the diaphragm to facilitate improved respiratory effort.  Minimal benefit noted   SLP - Dysphagia/Swallow   Treatment Detail Orders received for repeat blue dye test. Blue dye test was administered with speaking valve in place.  Patient required moderate cues, verbal and tactile, to initiate swallow.  Upon tracheal suctioning following swallow, by RT, no aspiration of secretions wereTo improve swallow frequency and effectiveness, effortful swallow exercise was implemented.  Patient produced 14 volitional dry swallows, with mild to max cues, including verbal prompts, model, tactile prompts, articulatory placement cues, moist swab to the lips and tongue and pressure to the tongue, verbal cues to lick lips, verbal cues for volitional throat clear.  Patient required some combination of all of these to produce above swallows. noted.   SLP Discharge Planning    SLP Discharge Recommendation (DC Rec) Transitional Care Facility   SLP Rationale for DC Rec Patient is well below his cognitive-linguistic baseline.   SLP Brief overview of current status  SLP: Repeat blue dye test completed with no blue dye suctioned, suggesting no gross immediate aspiration of secretions.  Continues to be at risk for aspiration of secretions given infrequency of swallowing.  But improved from previous blue dye test.  Decreased  number of swallows were elicited during treatment today, significantly impacted by decreased attention and initiation.  Continues to be aphonic primarily, even with speaking valve in place.  Appears to be more related to cognition and initiation then adequate airflow.   Additional Documentation   SLP Plan SLP: Effortful swallow exercises and voicing.   Total Session Time   Total Session Time (minutes) 40 minutes

## 2021-07-28 NOTE — PLAN OF CARE
Problem: Communication Impairment (Artificial Airway)  Goal: Effective Communication  7/28/2021 0012 by Hemalatha Kolb, RT  Outcome: Improving  7/28/2021 0009 by Hemalatha Kolb, RT  Outcome: Improving     Problem: Device-Related Complication Risk (Artificial Airway)  Goal: Optimal Device Function  Outcome: Improving     Problem: Skin and Tissue Injury (Artificial Airway)  Goal: Absence of Device-Related Skin or Tissue Injury  Outcome: Improving   RT PROGRESS NOTE     DATA:     CURRENT SETTINGS:             TRACH TYPE / SIZE:  7 Bivona             MODE:   TM with cuff infalted             FIO2:   30L/30%     ACTION:             THERAPIES:   [place]-neb/mucomyst/saline bid/duo-neb q6             SUCTION:                           FREQUENCY:   x4                        AMOUNT:   large x3/small                         CONSISTENCY:   thick                         COLOR:   white/yellow              SPONTANEOUS COUGH EFFORT/STRENGTH OF EFFORT (not elicited by suctioning): yes/strong                              WEANING PHASE:   2 with cuff up @ NOC                        WEAN MODE:    TM/PMV 4hrs bid                        WEAN TIME:   2:25                        END WEAN REASON:   PMV removed per order     RESPONSE:             BS:   clear/coarse              VITAL SIGNS:   Sat 93-95%, HR 84-92, RR 20-26             EMOTIONAL NEEDS / CONCERNS:                  RISK FOR SELF DECANNULATION:  Yes                        RISK DUE TO:  confusion                        INTERVENTION/S IN PLACE IS/ARE:  bilateral mitts in place       NOTE / PLAN:   Cont with plan.

## 2021-07-28 NOTE — PROGRESS NOTES
Pulmonary Medicine Follow up Note - Ventilator Rounds:    Clinical status discussed today with respiratory therapist.    Chief complaint: Ongoing respiratory failure  Significant change in clinical status during past 24 hours? -  Changed to 6 bivona today without incident. BDT today with no immediate aspiration which is improvement, but needing cues to swallow secretions from SLP, likely due to stroke with poor volition to intentionally swallow (see excellent note from David Coffman from 7/28/21).     FiO2 (%): 30% 30 L/min HFTM    Tracheal secretions: o/n x4 for large (x3) and small (x1) thick white/yellow, down to suctioning 3 times today with less secretions  Current phase of ventilator weaning pathway: Phase 2  Ventilator weaning results from yesterday: 24 hours trach mask with HFTM 30 L/min 30%, tolerating PMV for 4 hrs BID    Current Facility-Administered Medications   Medication     acetaminophen (TYLENOL) solution 650 mg    Or     acetaminophen (TYLENOL) tablet 650 mg     acetylcysteine (MUCOMYST) 20 % nebulizer solution 2 mL     alteplase (CATHFLO ACTIVASE) injection 2 mg     bisacodyl (DULCOLAX) Suppository 10 mg     dextrose 50 % injection 25-50 mL    Or     glucagon injection 1 mg     docusate (COLACE) 50 MG/5ML liquid 100 mg    Or     docusate sodium (COLACE) capsule 100 mg     famotidine (PEPCID) tablet 20 mg     fiber modular (NUTRISOURCE FIBER) packet 3 packet     heparin ANTICOAGULANT injection 5,000 Units     hydrALAZINE (APRESOLINE) injection 10 mg     ipratropium - albuterol 0.5 mg/2.5 mg/3 mL (DUONEB) neb solution 3 mL     lisinopril (ZESTRIL) tablet 40 mg     loperamide (IMODIUM) 1 MG/7.5ML liquid 1 mg     magnesium hydroxide (MILK OF MAGNESIA) suspension 30 mL     menthol-zinc oxide (CALMOSEPTINE) 0.44-20.6 % ointment OINT     methylphenidate (RITALIN) tablet 10 mg     mirtazapine (REMERON) tablet TABS 7.5 mg     naloxone (NARCAN) injection 0.2 mg    Or     naloxone (NARCAN) injection 0.4  "mg    Or     naloxone (NARCAN) injection 0.2 mg    Or     naloxone (NARCAN) injection 0.4 mg     ondansetron (ZOFRAN) solution 4 mg     polyethylene glycol (MIRALAX) Packet 17 g     scopolamine (TRANSDERM) 72 hr patch 1 patch    And     scopolamine (TRANSDERM-SCOP) Patch in Place     sodium chloride (NEBUSAL) 3 % neb solution 3 mL     sodium chloride (PF) 0.9% PF flush 3 mL     sodium chloride 0.9% infusion     venlafaxine (EFFEXOR) tablet 37.5 mg       Physical exam     /70 (BP Location: Left arm)   Pulse 86   Temp 98  F (36.7  C) (Axillary)   Resp 22   Ht 1.803 m (5' 10.98\")   Wt 65.9 kg (145 lb 4.8 oz)   SpO2 92%   BMI 20.27 kg/m      Gen: eyes open, appears chronically ill  HEENT: pale conjunctiva, moist mucosa  Neck: s/p trach, changed to 6 bivona  Lungs: CTA anterolaterally  CV: regular, no murmurs or gallops appreciated  Abdomen: soft, NT, BS wnl  Ext: no edema  Neuro: eyes open, trying to vocalize, generalized weakness    Sodium   Date Value Ref Range Status   07/28/2021 139 136 - 145 mmol/L Final   06/11/2021 143 133 - 144 mmol/L Final     Potassium   Date Value Ref Range Status   07/28/2021 4.2 3.5 - 5.0 mmol/L Final   06/11/2021 3.8 3.4 - 5.3 mmol/L Final     Chloride   Date Value Ref Range Status   07/28/2021 97 (L) 98 - 107 mmol/L Final   06/11/2021 106 94 - 109 mmol/L Final     Carbon Dioxide   Date Value Ref Range Status   06/11/2021 36 (H) 20 - 32 mmol/L Final     Carbon Dioxide (CO2)   Date Value Ref Range Status   07/28/2021 31 22 - 31 mmol/L Final     Anion Gap   Date Value Ref Range Status   07/28/2021 11 5 - 18 mmol/L Final   06/11/2021 2 (L) 3 - 14 mmol/L Final     Glucose   Date Value Ref Range Status   07/28/2021 148 (H) 70 - 125 mg/dL Final   06/11/2021 142 (H) 70 - 99 mg/dL Final     Urea Nitrogen   Date Value Ref Range Status   07/28/2021 29 (H) 8 - 22 mg/dL Final   06/11/2021 25 7 - 30 mg/dL Final     Creatinine   Date Value Ref Range Status   07/28/2021 0.74 0.70 - 1.30 " mg/dL Final   06/11/2021 0.57 (L) 0.66 - 1.25 mg/dL Final     GFR Estimate   Date Value Ref Range Status   07/28/2021 >90 >60 mL/min/1.73m2 Final     Comment:     As of July 11, 2021, eGFR is calculated by the CKD-EPI creatinine equation, without race adjustment. eGFR can be influenced by muscle mass, exercise, and diet. The reported eGFR is an estimation only and is only applicable if the renal function is stable.   07/09/2021 >60 >60 mL/min/1.73m2 Final   06/11/2021 >90 >60 mL/min/[1.73_m2] Final     Comment:     Non  GFR Calc  Starting 12/18/2018, serum creatinine based estimated GFR (eGFR) will be   calculated using the Chronic Kidney Disease Epidemiology Collaboration   (CKD-EPI) equation.       Calcium   Date Value Ref Range Status   07/28/2021 10.9 (H) 8.5 - 10.5 mg/dL Final   06/11/2021 9.6 8.5 - 10.1 mg/dL Final       XR CHEST PORT 1 VIEW  LOCATION: [facility]  DATE/TIME: 7/15/2021 7:50 AM  INDICATION: New VAP followup.  COMPARISON: 07/09/2021.  IMPRESSION:   No change in tracheostomy tube. Heart and mediastinal size are stable. There is indistinctness of the pulmonary interstitium, representing either airway inflammation or edema. There is bandlike opacity involving the retrocardiac region of the left lung base, increased from prior study and representing either atelectasis or infectious process. No pleural effusion or pneumothorax. There is some mild elevation of the right hemidiaphragm.    Diagnosis:     Patient is a 71 yo man with history of hypertension. Patient initially presented on 15-May-2021 to [facility] after being found unresponsive by his wife. Patient was then found to have a subarachnoid hemorrhage secondary to a ruptured aneurysm. Patient was intubated and was started on mannitol, IV antihypertensives and hyperventilation and patient was then transferred to [facility]. Patient underwent aneurysm clipping as well as placement  of extra-ventricular drain from hydrocephalus on 15-May-2021. Extra-ventricular drain would malfunction and would require replacement on 04-Jun-2021 and 06-Jun-2021. Extra-ventricular drain reportedly was removed on 09-Jun-2021. Patient had intra-arterial vasospasm treatment with verapamil on 28-May-2021.  Patient was extubated on 16-May-2021 but had to be reintubated on 19-May-2021. Patient would be extubated on 03-Jun-2021 but would be reintubated on 06-Jun-2021. Patient had tracheostomy and PEG-tube placed on 07-Jun-2021. Patient required treatment for hospital-acquired pneumonia. Patient was transferred to [facility] on 11-Jun-2021.    1. Acute respiratory failure s/p trach 6/7/2021  2. S/p treatment pseudomonas ESBL and klebsiella pneumonia   3. Encephalopathy   4. Subarachnoid bleed s/p craniotomy and clipping P-comm rupture aneurysm.   5. HTN  6. Malnourishment   7. Dysphagia s/p G tube    Recommendations/Plans (MDM):  1. Weaning orders: continue phase 2 with trials of PMV up to 4 hrs BID as tolerated  2. Last trach change: downsized from #7 Bivona to #6 Bivona on 7/28  3. BDT after trach downsize on 7/28 with no immediate aspiration which is improvement  4. See SLP note from David Coffman from 7/28; much of swallow issue appears to be volitional due to stroke, needs to be cued to swallow secretions  5. Diagnostic testing?  - CXR on 7/26 with right basilar scarring likely from prior aspiration, no obvious acute infiltrate, PCT low, tracheal aspirate culture from 7/26 in process; does not appear to have new pneumonia or tracheobronchitis  - BDT after trach downsize to #6 bivona on 7/28 with no immediate aspiration which is improvement, but needed coaching from SLP to volitionally swallow  6. Continue scopolamine patch for now but watch for secretion inspissation, anticholinergic neurologic side effects; would not increase dose  7. Continue scheduled ipratopium/albuterol nebs; metaneb, acetylcysteine, and 3%  saline nebs BID added on 7/26 and seem to be helping somewhat  8. DVT prophylaxis: heparin subcutaneous  9. Discussed with RT    Jamin Davis MD  Pulmonary and Critical Care Medicine  [facility]  Cell [phone]  Pager [phone]  Office [phone]  (he/him/his)

## 2021-07-29 ENCOUNTER — APPOINTMENT (OUTPATIENT)
Dept: OCCUPATIONAL THERAPY | Facility: CLINIC | Age: 71
DRG: 207 | End: 2021-07-29
Attending: HOSPITALIST
Payer: COMMERCIAL

## 2021-07-29 ENCOUNTER — APPOINTMENT (OUTPATIENT)
Dept: SPEECH THERAPY | Facility: CLINIC | Age: 71
DRG: 207 | End: 2021-07-29
Attending: HOSPITALIST
Payer: COMMERCIAL

## 2021-07-29 ENCOUNTER — APPOINTMENT (OUTPATIENT)
Dept: PHYSICAL THERAPY | Facility: CLINIC | Age: 71
DRG: 207 | End: 2021-07-29
Attending: HOSPITALIST
Payer: COMMERCIAL

## 2021-07-29 PROCEDURE — 999N000009 HC STATISTIC AIRWAY CARE

## 2021-07-29 PROCEDURE — 250N000013 HC RX MED GY IP 250 OP 250 PS 637

## 2021-07-29 PROCEDURE — 94660 CPAP INITIATION&MGMT: CPT

## 2021-07-29 PROCEDURE — 250N000013 HC RX MED GY IP 250 OP 250 PS 637: Performed by: INTERNAL MEDICINE

## 2021-07-29 PROCEDURE — 999N000078 HC STATISTIC INTRAPULMONARY PERCUSSIVE VENT

## 2021-07-29 PROCEDURE — 99207 PR CDG-CUT & PASTE-POTENTIAL IMPACT ON LEVEL: CPT | Performed by: INTERNAL MEDICINE

## 2021-07-29 PROCEDURE — 97530 THERAPEUTIC ACTIVITIES: CPT | Mod: GO | Performed by: OCCUPATIONAL THERAPIST

## 2021-07-29 PROCEDURE — 99233 SBSQ HOSP IP/OBS HIGH 50: CPT | Performed by: INTERNAL MEDICINE

## 2021-07-29 PROCEDURE — 250N000009 HC RX 250: Performed by: INTERNAL MEDICINE

## 2021-07-29 PROCEDURE — 94640 AIRWAY INHALATION TREATMENT: CPT | Mod: 76

## 2021-07-29 PROCEDURE — 250N000013 HC RX MED GY IP 250 OP 250 PS 637: Performed by: HOSPITALIST

## 2021-07-29 PROCEDURE — 97112 NEUROMUSCULAR REEDUCATION: CPT | Mod: GP | Performed by: PHYSICAL THERAPIST

## 2021-07-29 PROCEDURE — 94668 MNPJ CHEST WALL SBSQ: CPT

## 2021-07-29 PROCEDURE — 120N000017 HC R&B RESPIRATORY CARE

## 2021-07-29 PROCEDURE — 97530 THERAPEUTIC ACTIVITIES: CPT | Mod: GP | Performed by: PHYSICAL THERAPIST

## 2021-07-29 PROCEDURE — 999N000157 HC STATISTIC RCP TIME EA 10 MIN

## 2021-07-29 PROCEDURE — 92507 TX SP LANG VOICE COMM INDIV: CPT | Mod: GN

## 2021-07-29 PROCEDURE — 92526 ORAL FUNCTION THERAPY: CPT | Mod: GN

## 2021-07-29 PROCEDURE — 94640 AIRWAY INHALATION TREATMENT: CPT

## 2021-07-29 PROCEDURE — 250N000011 HC RX IP 250 OP 636

## 2021-07-29 RX ADMIN — IPRATROPIUM BROMIDE AND ALBUTEROL SULFATE 3 ML: .5; 3 SOLUTION RESPIRATORY (INHALATION) at 07:25

## 2021-07-29 RX ADMIN — SODIUM CHLORIDE 30 MG/ML INHALATION SOLUTION 3 ML: 30 SOLUTION INHALANT at 20:06

## 2021-07-29 RX ADMIN — ANORECTAL OINTMENT: 15.7; .44; 24; 20.6 OINTMENT TOPICAL at 08:13

## 2021-07-29 RX ADMIN — MIRTAZAPINE 7.5 MG: 7.5 TABLET ORAL at 22:36

## 2021-07-29 RX ADMIN — Medication 3 PACKET: at 14:16

## 2021-07-29 RX ADMIN — IPRATROPIUM BROMIDE AND ALBUTEROL SULFATE 3 ML: .5; 3 SOLUTION RESPIRATORY (INHALATION) at 14:27

## 2021-07-29 RX ADMIN — Medication 3 PACKET: at 08:14

## 2021-07-29 RX ADMIN — HEPARIN SODIUM 5000 UNITS: 5000 INJECTION, SOLUTION INTRAVENOUS; SUBCUTANEOUS at 05:57

## 2021-07-29 RX ADMIN — LISINOPRIL 40 MG: 20 TABLET ORAL at 19:37

## 2021-07-29 RX ADMIN — METHYLPHENIDATE HYDROCHLORIDE 10 MG: 10 TABLET ORAL at 05:57

## 2021-07-29 RX ADMIN — VENLAFAXINE 37.5 MG: 37.5 TABLET ORAL at 08:14

## 2021-07-29 RX ADMIN — DOCUSATE SODIUM 100 MG: 50 LIQUID ORAL at 06:52

## 2021-07-29 RX ADMIN — IPRATROPIUM BROMIDE AND ALBUTEROL SULFATE 3 ML: .5; 3 SOLUTION RESPIRATORY (INHALATION) at 20:06

## 2021-07-29 RX ADMIN — ACETYLCYSTEINE 2 ML: 200 SOLUTION ORAL; RESPIRATORY (INHALATION) at 07:25

## 2021-07-29 RX ADMIN — SCOPALAMINE 1 PATCH: 1 PATCH, EXTENDED RELEASE TRANSDERMAL at 14:19

## 2021-07-29 RX ADMIN — SODIUM CHLORIDE 30 MG/ML INHALATION SOLUTION 3 ML: 30 SOLUTION INHALANT at 07:25

## 2021-07-29 RX ADMIN — HEPARIN SODIUM 5000 UNITS: 5000 INJECTION, SOLUTION INTRAVENOUS; SUBCUTANEOUS at 14:16

## 2021-07-29 RX ADMIN — VENLAFAXINE 37.5 MG: 37.5 TABLET ORAL at 12:08

## 2021-07-29 RX ADMIN — ANORECTAL OINTMENT: 15.7; .44; 24; 20.6 OINTMENT TOPICAL at 14:16

## 2021-07-29 RX ADMIN — IPRATROPIUM BROMIDE AND ALBUTEROL SULFATE 3 ML: .5; 3 SOLUTION RESPIRATORY (INHALATION) at 02:58

## 2021-07-29 RX ADMIN — FAMOTIDINE 20 MG: 20 TABLET, FILM COATED ORAL at 08:14

## 2021-07-29 RX ADMIN — ANORECTAL OINTMENT: 15.7; .44; 24; 20.6 OINTMENT TOPICAL at 22:38

## 2021-07-29 RX ADMIN — FAMOTIDINE 20 MG: 20 TABLET, FILM COATED ORAL at 22:36

## 2021-07-29 RX ADMIN — VENLAFAXINE 37.5 MG: 37.5 TABLET ORAL at 18:13

## 2021-07-29 RX ADMIN — HEPARIN SODIUM 5000 UNITS: 5000 INJECTION, SOLUTION INTRAVENOUS; SUBCUTANEOUS at 22:42

## 2021-07-29 RX ADMIN — ACETYLCYSTEINE 2 ML: 200 SOLUTION ORAL; RESPIRATORY (INHALATION) at 20:06

## 2021-07-29 ASSESSMENT — MIFFLIN-ST. JEOR: SCORE: 1450.49

## 2021-07-29 NOTE — PLAN OF CARE
Problem: Adult Inpatient Plan of Care  Goal: Plan of Care Review  Outcome: No Change     Problem: Restraint for Non-Violent/Non-Self-Destructive Behavior  Goal: Prevent/Manage Potential Problems  Description: Maintain safety of patient and others during period of restraint.  Promote psychological and physical wellbeing.  Prevent injury to skin and involved body parts.  Promote nutrition, hydration, and elimination.  Outcome: No Change     Problem: Skin and Tissue Injury (Artificial Airway)  Goal: Absence of Device-Related Skin or Tissue Injury  Outcome: Improving     Problem: Adult Inpatient Plan of Care  Goal: Absence of Hospital-Acquired Illness or Injury  Intervention: Identify and Manage Fall Risk  Recent Flowsheet Documentation  Taken 7/29/2021 0820 by Mariam Robles RN  Safety Promotion/Fall Prevention:   bed alarm on   mobility aid in reach     Vital signs stable. Pt denied pain. No PRNs given.  Pt's family visited via iPad. Pt spent time up in chair.  All care needs met.

## 2021-07-29 NOTE — PLAN OF CARE
Problem: Restraint for Non-Violent/Non-Self-Destructive Behavior  Goal: Prevent/Manage Potential Problems  Description: Maintain safety of patient and others during period of restraint.  Promote psychological and physical wellbeing.  Prevent injury to skin and involved body parts.  Promote nutrition, hydration, and elimination.  7/29/2021 0702 by Kristal Jaramillo RN  Outcome: No Change  7/29/2021 0700 by Kristal Jaramillo RN  Outcome: No Change     Problem: Skin and Tissue Injury (Artificial Airway)  Goal: Absence of Device-Related Skin or Tissue Injury  7/29/2021 0702 by Kristal Jaramillo RN  Outcome: No Change  7/29/2021 0700 by Kristal Jaramillo RN  Outcome: No Change     Problem: Adult Inpatient Plan of Care  Goal: Absence of Hospital-Acquired Illness or Injury  Intervention: Identify and Manage Fall Risk  Recent Flowsheet Documentation  Taken 7/29/2021 0036 by Kristal Jaramillo RN  Safety Promotion/Fall Prevention:   bed alarm on   room door open   room near nurse's station   safety round/check completed  Intervention: Prevent Infection  Recent Flowsheet Documentation  Taken 7/29/2021 0036 by Kristal Jaramillo RN  Infection Prevention:   hand hygiene promoted   rest/sleep promoted

## 2021-07-29 NOTE — PLAN OF CARE
Problem: Anxiety  Goal: Anxiety Reduction or Resolution  Outcome: No Change    Slept  5-6 hours the whole night, no complaints of pain.

## 2021-07-29 NOTE — PLAN OF CARE
Problem: Communication Impairment (Artificial Airway)  Goal: Effective Communication  7/28/2021 0012 by Hemalatha Kolb, RT  Outcome: Improving  7/28/2021 0009 by Hemalatha Kolb, RT  Outcome: Improving     Problem: Device-Related Complication Risk (Artificial Airway)  Goal: Optimal Device Function  Outcome: Improving     Problem: Skin and Tissue Injury (Artificial Airway)  Goal: Absence of Device-Related Skin or Tissue Injury  Outcome: Improving   RT PROGRESS NOTE     DATA:     CURRENT SETTINGS:             TRACH TYPE / SIZE:  6 Bivona 7/28             MODE:   TM with cuff infalted             FIO2:   30L/30%     ACTION:             THERAPIES:   [place]-neb/mucomyst/saline bid/duo-neb q6             SUCTION:                           FREQUENCY:   x5                        AMOUNT:   large x3/small x2                        CONSISTENCY:   thick                         COLOR:   white/yellow              SPONTANEOUS COUGH EFFORT/STRENGTH OF EFFORT (not elicited by suctioning): yes/strong                              WEANING PHASE:   2 with cuff up @ NOC                        WEAN MODE:    TM/PMV                        WEAN TIME:   3:03                        END WEAN REASON:   PMV removed per order     RESPONSE:             BS:   Coarse              VITAL SIGNS:   Sat 94-99%, HR 81-85, RR 20-22             EMOTIONAL NEEDS / CONCERNS:                  RISK FOR SELF DECANNULATION:  Yes                        RISK DUE TO:  confusion                        INTERVENTION/S IN PLACE IS/ARE:  bilateral mitts in place       NOTE / PLAN:   Cont with plan. PMV trails daily every 4hrs bid per order.

## 2021-07-29 NOTE — PROGRESS NOTES
[facility]    Medicine Progress Note - Hospitalist Service       Date of Admission:  6/11/2021    Summary:  Dayron Neri is a 70 year male with h/o HTN who presented to ED on 5/15/2021 with acute SAH after a fall with unresponsiveness.  He underwent emergent left external ventricular drain placement.  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  On 5/28/2021, patient had persistent cerebral vasospasm, CT head showed bilateral BAYLEE infarct and was treated with milrinone and verapamil.  On 6/1/2021, angiogram showed no evidence of vasospasm.  Extubated on 6/3/2021.  6/6/2021 he was reintubated due to hypoxia.  On 6/7/2021, patient underwent tracheostomy and PEG tube placement.  EVD removed 6/9/2021.   Patient was treated for possible pneumonia with Unasyn on 5/20/2021, It was switched to ceftriaxone the next day for sputum culture growing Klebsiella. On 5/23, sputum culture also grew Pseudomonas. Antibiotic was switched to Zosyn.  Due to increased WBCs in CSF, possibility of ventriculitis was raised. CSF culture was negative. Antibiotic was switched to cefepime and vancomycin on 5/28/2021 for 2 weeks.  Vanco was discontinued on 6/9/2021.  On 6/5/2021, sputum grew ESBL so cefepime was switched to meropenem. He was transferred to [facility] on 6/11/21.       7/29 :       Respiratory status stable - on 30 liters  On restraints  No new issues noted  Discussed with wife on phone and answered questions.    Plan to have a family conference tomorrow and discuss goals of care. It seems like patient's clinical improvement plateaued      Assessment & Plan           Acute on chronic hypoxemic respiratory failure: S/p treatment pseudomonas ESBL and klebsiella pneumonia. Weaning has been slow and the biggest barrier for decannulation is excessive secretions  Glycopyrrolate was tried and stopped.  Also diuresed him with Lasix for 3 doses  with some improvement however that improvement seems to have stabilized or plateaued.  Speaking valve is being attempted in short trials but does aspirate with blue dye test so needs to be only with speech/RT present.  Currently on phase 2 weaning with TM/PMV during day and is off the vent at night.   - Continue to wean per RT and pulmonary.   - Continue  metaneb, acetylcysteine, and 3% saline nebs   - Continue Scopolamine (started 7/20/2021)    Ventilator associated pneumonia: Completed antibiotics on July 18.    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 sputum culture 6/19 showed MDR pseudomonas. On Tobramycin nebs 6/24/2021 - 7/9/2021.   - Continue pulmonary toilet, scheduled duonebs    Traumatic brain injury/intracranial hemorrhage: 6/14/21 CT head done for fatigue and read as slight increase in caliber of lateral and third ventricle with possibility of developing communicating hydrocephalus. Discussed with Dr. Casillas (neurosurgeon at [facility]), who did not think there was much change. CT head repeated on 6/16/21, which did not show any change.  - Need follow up with Dr. Martinez with Neurosurgery in 3 months with repeat CTA of head and neck for post-op f/u.    Dysphagia: Patient failed the blue dye test 7/20/21 with immediate aspiration, continue to use tube feeds. Speech therapy following.     Acute metabolic encephalopathy: improved.   - Continue Ritalin and effexor     Anemia: hemoglobin stable. Continue to monitor.     Essential hypertension: BP controlled. On lisinopril    Severe debility, weakness, critical illness, deconditioning, Continue PT/OT    Hypercalcemia - unclear etiology, mild at this point. Parathyroid hormone normal. May be related to relative immobilization as he is walking with therapy but only 1-2 times a day. Monitor.    Insomnia: is on Ritalin which was decreased to once daily due to insomnia when taken later in day.    - Continue  nighttime mirtazapine 7.5 mg scheduled.       Diet: Adult Formula Drip Feeding: Continuous Osmolite 1.5; Gastrostomy; Goal Rate: 90; mL/hr; Medication - Feeding Tube Flush Frequency: 240 mL q 4 hours water flushes; Amount to Send (Nutrition use): send 9 packets Nutrisource Fiber daily; Tube Feeding...    DVT Prophylaxis: Heparin SQ  Gutierrez Catheter: Not present  Central Lines: None  Code Status: Full Code      Disposition Plan   Expected discharge: to be determined    The patient's care was discussed with the Bedside Nurse and Care Coordinator/.    Isreal Granda MD  Hospitalist Service  [facility] "[employer]  Securely message with the Vocera Web Console (learn more here)  Text page via [employer] Paging/Directory      ______________________________________________________________________      Data reviewed today: I reviewed all medications, new labs and imaging results over the last 24 hours.     Physical Exam   Vital Signs: Temp: 98  F (36.7  C) Temp src: Axillary BP: 111/68 Pulse: 85   Resp: 22 SpO2: 97 % O2 Device: Trach dome Oxygen Delivery: 30 LPM  Weight: 145 lbs 3.2 oz    General appearance: not in acute distress  HEENT: PERRL, EOMI  Lungs: Clear breath sounds in bilateral lung fields  Cardiovascular: Regular rate and rhythm, normal S1-S2  Abdomen: Soft, non tender, no distension  Musculoskeletal: No joint swelling  Skin: No rash and no edema  Neurology: AAOx3, Cranial nerves II - XII normal. Moves bilateral arms. Able to lift bilateral legs off the floor.    Data   Recent Labs   Lab 07/28/21  0601 07/26/21  0614 07/25/21  0705 07/23/21  0627   WBC 10.2  --  10.4  --    HGB 10.3*  --  10.5*  --    MCV 99  --  98  --      --  489*  --     139  --  141   POTASSIUM 4.2 4.4  --  4.3   CHLORIDE 97* 98  --  101   CO2 31 29  --  30   BUN 29* 27*  --  21   CR 0.74 0.72  --  0.63*   ANIONGAP 11 12  --  10   RAGINI 10.9* 10.7* 10.8* 10.9*   * 132*  --  101       Restraint:    Patient pulls  his IV line and trach.     Within an hour after restraint an in person face to face assessment was completed at 9:30 AM, including an evaluation of the patient's immediate reaction to the intervention, behavioral assessment and review/assessment of history, drugs and medications, recent labs, etc., and behavioral condition.  The patient experienced: No adverse physical outcome from seclusion/restraint initiation.  The intervention of restraint or seclusion needs to continue.

## 2021-07-30 ENCOUNTER — APPOINTMENT (OUTPATIENT)
Dept: PHYSICAL THERAPY | Facility: CLINIC | Age: 71
DRG: 207 | End: 2021-07-30
Attending: HOSPITALIST
Payer: COMMERCIAL

## 2021-07-30 ENCOUNTER — APPOINTMENT (OUTPATIENT)
Dept: OCCUPATIONAL THERAPY | Facility: CLINIC | Age: 71
DRG: 207 | End: 2021-07-30
Attending: HOSPITALIST
Payer: COMMERCIAL

## 2021-07-30 ENCOUNTER — APPOINTMENT (OUTPATIENT)
Dept: SPEECH THERAPY | Facility: CLINIC | Age: 71
DRG: 207 | End: 2021-07-30
Attending: HOSPITALIST
Payer: COMMERCIAL

## 2021-07-30 LAB
ANION GAP SERPL CALCULATED.3IONS-SCNC: 10 MMOL/L (ref 5–18)
BASOPHILS # BLD AUTO: 0 10E3/UL (ref 0–0.2)
BASOPHILS NFR BLD AUTO: 0 %
BUN SERPL-MCNC: 31 MG/DL (ref 8–22)
CALCIUM SERPL-MCNC: 10.7 MG/DL (ref 8.5–10.5)
CHLORIDE BLD-SCNC: 98 MMOL/L (ref 98–107)
CO2 SERPL-SCNC: 32 MMOL/L (ref 22–31)
CREAT SERPL-MCNC: 0.78 MG/DL (ref 0.7–1.3)
EOSINOPHIL # BLD AUTO: 0.1 10E3/UL (ref 0–0.7)
EOSINOPHIL NFR BLD AUTO: 1 %
ERYTHROCYTE [DISTWIDTH] IN BLOOD BY AUTOMATED COUNT: 14.3 % (ref 10–15)
GFR SERPL CREATININE-BSD FRML MDRD: >90 ML/MIN/1.73M2
GLUCOSE BLD-MCNC: 122 MG/DL (ref 70–125)
HCT VFR BLD AUTO: 30.8 % (ref 40–53)
HGB BLD-MCNC: 10.2 G/DL (ref 13.3–17.7)
IMM GRANULOCYTES # BLD: 0 10E3/UL
IMM GRANULOCYTES NFR BLD: 0 %
LYMPHOCYTES # BLD AUTO: 1.2 10E3/UL (ref 0.8–5.3)
LYMPHOCYTES NFR BLD AUTO: 19 %
MCH RBC QN AUTO: 31.9 PG (ref 26.5–33)
MCHC RBC AUTO-ENTMCNC: 33.1 G/DL (ref 31.5–36.5)
MCV RBC AUTO: 96 FL (ref 78–100)
MONOCYTES # BLD AUTO: 0.6 10E3/UL (ref 0–1.3)
MONOCYTES NFR BLD AUTO: 9 %
NEUTROPHILS # BLD AUTO: 4.7 10E3/UL (ref 1.6–8.3)
NEUTROPHILS NFR BLD AUTO: 71 %
NRBC # BLD AUTO: 0 10E3/UL
NRBC BLD AUTO-RTO: 0 /100
PLATELET # BLD AUTO: 373 10E3/UL (ref 150–450)
POTASSIUM BLD-SCNC: 4.5 MMOL/L (ref 3.5–5)
PROCALCITONIN SERPL-MCNC: 0.08 NG/ML (ref 0–0.49)
RBC # BLD AUTO: 3.2 10E6/UL (ref 4.4–5.9)
SODIUM SERPL-SCNC: 140 MMOL/L (ref 136–145)
WBC # BLD AUTO: 6.7 10E3/UL (ref 4–11)

## 2021-07-30 PROCEDURE — 999N000009 HC STATISTIC AIRWAY CARE

## 2021-07-30 PROCEDURE — 99233 SBSQ HOSP IP/OBS HIGH 50: CPT | Performed by: INTERNAL MEDICINE

## 2021-07-30 PROCEDURE — 250N000009 HC RX 250: Performed by: INTERNAL MEDICINE

## 2021-07-30 PROCEDURE — 94660 CPAP INITIATION&MGMT: CPT

## 2021-07-30 PROCEDURE — 250N000013 HC RX MED GY IP 250 OP 250 PS 637: Performed by: HOSPITALIST

## 2021-07-30 PROCEDURE — 84145 PROCALCITONIN (PCT): CPT | Performed by: INTERNAL MEDICINE

## 2021-07-30 PROCEDURE — 120N000017 HC R&B RESPIRATORY CARE

## 2021-07-30 PROCEDURE — 250N000011 HC RX IP 250 OP 636

## 2021-07-30 PROCEDURE — 36415 COLL VENOUS BLD VENIPUNCTURE: CPT | Performed by: HOSPITALIST

## 2021-07-30 PROCEDURE — 250N000013 HC RX MED GY IP 250 OP 250 PS 637: Performed by: INTERNAL MEDICINE

## 2021-07-30 PROCEDURE — 85025 COMPLETE CBC W/AUTO DIFF WBC: CPT | Performed by: INTERNAL MEDICINE

## 2021-07-30 PROCEDURE — 82310 ASSAY OF CALCIUM: CPT | Performed by: HOSPITALIST

## 2021-07-30 PROCEDURE — 999N000157 HC STATISTIC RCP TIME EA 10 MIN

## 2021-07-30 PROCEDURE — 94640 AIRWAY INHALATION TREATMENT: CPT

## 2021-07-30 PROCEDURE — 92526 ORAL FUNCTION THERAPY: CPT | Mod: GN | Performed by: SPEECH-LANGUAGE PATHOLOGIST

## 2021-07-30 PROCEDURE — 94640 AIRWAY INHALATION TREATMENT: CPT | Mod: 76

## 2021-07-30 PROCEDURE — 94668 MNPJ CHEST WALL SBSQ: CPT

## 2021-07-30 PROCEDURE — 36415 COLL VENOUS BLD VENIPUNCTURE: CPT | Performed by: INTERNAL MEDICINE

## 2021-07-30 PROCEDURE — 99207 PR CDG-CUT & PASTE-POTENTIAL IMPACT ON LEVEL: CPT | Performed by: INTERNAL MEDICINE

## 2021-07-30 PROCEDURE — 250N000013 HC RX MED GY IP 250 OP 250 PS 637

## 2021-07-30 PROCEDURE — 97530 THERAPEUTIC ACTIVITIES: CPT | Mod: GP | Performed by: PHYSICAL THERAPIST

## 2021-07-30 PROCEDURE — 97530 THERAPEUTIC ACTIVITIES: CPT | Mod: GO | Performed by: OCCUPATIONAL THERAPIST

## 2021-07-30 PROCEDURE — 97110 THERAPEUTIC EXERCISES: CPT | Mod: GP | Performed by: PHYSICAL THERAPIST

## 2021-07-30 RX ORDER — DEXTROSE MONOHYDRATE 100 MG/ML
INJECTION, SOLUTION INTRAVENOUS CONTINUOUS PRN
Status: DISCONTINUED | OUTPATIENT
Start: 2021-07-30 | End: 2021-09-20

## 2021-07-30 RX ORDER — IPRATROPIUM BROMIDE AND ALBUTEROL SULFATE 2.5; .5 MG/3ML; MG/3ML
3 SOLUTION RESPIRATORY (INHALATION) 2 TIMES DAILY
Status: DISCONTINUED | OUTPATIENT
Start: 2021-07-30 | End: 2021-09-27 | Stop reason: HOSPADM

## 2021-07-30 RX ADMIN — HEPARIN SODIUM 5000 UNITS: 5000 INJECTION, SOLUTION INTRAVENOUS; SUBCUTANEOUS at 05:49

## 2021-07-30 RX ADMIN — VENLAFAXINE 37.5 MG: 37.5 TABLET ORAL at 08:56

## 2021-07-30 RX ADMIN — FAMOTIDINE 20 MG: 20 TABLET, FILM COATED ORAL at 23:02

## 2021-07-30 RX ADMIN — VENLAFAXINE 37.5 MG: 37.5 TABLET ORAL at 13:26

## 2021-07-30 RX ADMIN — IPRATROPIUM BROMIDE AND ALBUTEROL SULFATE 3 ML: .5; 3 SOLUTION RESPIRATORY (INHALATION) at 07:29

## 2021-07-30 RX ADMIN — Medication 3 PACKET: at 08:57

## 2021-07-30 RX ADMIN — Medication 3 PACKET: at 23:03

## 2021-07-30 RX ADMIN — MIRTAZAPINE 7.5 MG: 7.5 TABLET ORAL at 23:01

## 2021-07-30 RX ADMIN — ANORECTAL OINTMENT: 15.7; .44; 24; 20.6 OINTMENT TOPICAL at 13:26

## 2021-07-30 RX ADMIN — IPRATROPIUM BROMIDE AND ALBUTEROL SULFATE 3 ML: 2.5; .5 SOLUTION RESPIRATORY (INHALATION) at 20:19

## 2021-07-30 RX ADMIN — Medication 3 PACKET: at 13:26

## 2021-07-30 RX ADMIN — IPRATROPIUM BROMIDE AND ALBUTEROL SULFATE 3 ML: .5; 3 SOLUTION RESPIRATORY (INHALATION) at 02:36

## 2021-07-30 RX ADMIN — SODIUM CHLORIDE 30 MG/ML INHALATION SOLUTION 3 ML: 30 SOLUTION INHALANT at 20:34

## 2021-07-30 RX ADMIN — MAGNESIUM HYDROXIDE 30 ML: 400 SUSPENSION ORAL at 18:58

## 2021-07-30 RX ADMIN — SODIUM CHLORIDE 30 MG/ML INHALATION SOLUTION 3 ML: 30 SOLUTION INHALANT at 07:29

## 2021-07-30 RX ADMIN — ANORECTAL OINTMENT: 15.7; .44; 24; 20.6 OINTMENT TOPICAL at 08:56

## 2021-07-30 RX ADMIN — ACETAMINOPHEN 650 MG: 325 TABLET ORAL at 23:54

## 2021-07-30 RX ADMIN — HEPARIN SODIUM 5000 UNITS: 5000 INJECTION, SOLUTION INTRAVENOUS; SUBCUTANEOUS at 23:02

## 2021-07-30 RX ADMIN — HEPARIN SODIUM 5000 UNITS: 5000 INJECTION, SOLUTION INTRAVENOUS; SUBCUTANEOUS at 13:26

## 2021-07-30 RX ADMIN — ACETYLCYSTEINE 2 ML: 200 SOLUTION ORAL; RESPIRATORY (INHALATION) at 07:29

## 2021-07-30 RX ADMIN — FAMOTIDINE 20 MG: 20 TABLET, FILM COATED ORAL at 08:56

## 2021-07-30 RX ADMIN — METHYLPHENIDATE HYDROCHLORIDE 10 MG: 10 TABLET ORAL at 05:48

## 2021-07-30 RX ADMIN — ACETYLCYSTEINE 2 ML: 200 SOLUTION ORAL; RESPIRATORY (INHALATION) at 20:19

## 2021-07-30 RX ADMIN — LISINOPRIL 40 MG: 20 TABLET ORAL at 18:58

## 2021-07-30 RX ADMIN — VENLAFAXINE 37.5 MG: 37.5 TABLET ORAL at 17:57

## 2021-07-30 RX ADMIN — ANORECTAL OINTMENT: 15.7; .44; 24; 20.6 OINTMENT TOPICAL at 23:02

## 2021-07-30 RX ADMIN — DOCUSATE SODIUM 100 MG: 50 LIQUID ORAL at 08:56

## 2021-07-30 ASSESSMENT — MIFFLIN-ST. JEOR: SCORE: 1439.61

## 2021-07-30 NOTE — PROGRESS NOTES
CLINICAL NUTRITION SERVICES - REASSESSMENT NOTE      Recommendations Ordered by Registered Dietitian (RD):   Change TF regimen to different formula for BM regularity  Recommend TF as follows:   Type of Feeding Tube: gastrostomy  Enteral Frequency:  Continuous  Enteral Regimen: Vital 1.5 at 90 mL/hr  Total Enteral Provisions: 2160 mL daily provides 3240 kcal (50 kcal per kg), 147g protein (2.3 g per kg), 400g CHO, 13g fiber and 1650mL free water. Meets 100% of DRI's.  Free Water Flush: standard 180 mL q4 hours  TF + fluid flush  = 2730 mL per day    Daily electrolyte check, Mg and Phos add on  Daily weights  Start at 20 mL/hr, increase by 20 mL q8 hours until goal rate reached   Malnutrition:    % Weight Loss:  None noted  % Intake: No decreased intake noted- on enteral at goal rate  Subcutaneous Fat Loss:  Orbital region mild depletion and Upper arm region mild depletion  Muscle Loss:  Temporal region seere depletion, Clavicle bone region severe depletion, Acromion bone region moderate depletion, Dorsal hand region moderate depletion, Anterior thigh region severe depletion and Posterior calf region severe depletion - muscle loss likely related to limited mobility/wheelchair bound  Fluid Retention:  None noted    Malnutrition Diagnosis: Moderate malnutrition  In Context of:  Chronic illness or disease     EVALUATION OF PROGRESS TOWARD GOALS   Diet:  NPO    Nutrition Support:    Adult Formula Osmolite 1.5    Route Gastrostomy    Goal Rate 90    Goal Units mL/hr    Medication - Feeding Tube Flush Frequency 240 mL q 4 hours water flushes    Amount to Send (Nutrition use) send 9 packets Nutrisource Fiber daily    Additional comments Tube Feeding Continuous Initial Rate (mL/hr):30      Intake/Tolerance:  Pt tolerating formula, no longer having diarrhea but now needing bowel regimen medications for constipation    ASSESSED NUTRITION NEEDS:  Dosing Weight: 64.8    NEW FINDINGS:   Last Bm recorded 7/25, docusate administered  7/30  Pt. Unable to verbalize  Plan to have a family conference today and discuss goals of care. It seems like patient's clinical improvement plateaued.    Previous Goals:   n/a  Evaluation: Unable to evaluate    Previous Nutrition Diagnosis:   n/a  Evaluation: unable to evaluate    MALNUTRITION  % Weight Loss:  None noted  % Intake: No decreased intake noted- on enteral at goal rate  Subcutaneous Fat Loss:  Orbital region mild depletion and Upper arm region mild depletion  Muscle Loss:  Temporal region seere depletion, Clavicle bone region severe depletion, Acromion bone region moderate depletion, Dorsal hand region moderate depletion, Anterior thigh region severe depletion and Posterior calf region severe depletion - muscle loss likely related to limited mobility/wheelchair bound  Fluid Retention:  None noted    Malnutrition Diagnosis: Moderate malnutrition  In Context of:  Chronic illness or disease    CURRENT NUTRITION DIAGNOSIS  Predicted suboptimal nutrient intake related to swallowing difficulty and prolonged hospitalization as evidenced by severe muscle loss, mild fat loss.    INTERVENTIONS  Recommendations / Nutrition Prescription  Change TF regimen to different formula for BM regularity  Recommend TF as follows:   Type of Feeding Tube: gastrostomy  Enteral Frequency:  Continuous  Enteral Regimen: Vital 1.5 at 90 mL/hr  Total Enteral Provisions: 2160 mL daily provides 3240 kcal (50 kcal per kg), 147g protein (2.3 g per kg), 400g CHO, 13g fiber and 1650mL free water. Meets 100% of DRI's.  Free Water Flush: standard 180 mL q4 hours  TF + fluid flush  = 2730 mL per day    Daily electrolyte check, Mg and Phos add on  Daily weights  Start at 20 mL/hr, increase by 20 mL q8 hours until goal rate reached    Implementation  EN Composition, EN Schedule and Feeding Tube Flush    Goals  Bowel function WDL   Maintain weight while hospitalized     MONITORING AND EVALUATION:  Progress towards goals will be monitored and  evaluated per protocol and Practice Guidelines      Dulce Gonzales RDN, LD  Clinical Dietitian

## 2021-07-30 NOTE — PROGRESS NOTES
[facility]    Medicine Progress Note - Hospitalist Service       Date of Admission:  6/11/2021    Summary:  Dayron Neri is a 70 year male with h/o HTN who presented to ED on 5/15/2021 with acute SAH after a fall with unresponsiveness.  He underwent emergent left external ventricular drain placement.  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  On 5/28/2021, patient had persistent cerebral vasospasm, CT head showed bilateral BAYLEE infarct and was treated with milrinone and verapamil.  On 6/1/2021, angiogram showed no evidence of vasospasm.  Extubated on 6/3/2021.  6/6/2021 he was reintubated due to hypoxia.  On 6/7/2021, patient underwent tracheostomy and PEG tube placement.  EVD removed 6/9/2021.   Patient was treated for possible pneumonia with Unasyn on 5/20/2021, It was switched to ceftriaxone the next day for sputum culture growing Klebsiella. On 5/23, sputum culture also grew Pseudomonas. Antibiotic was switched to Zosyn.  Due to increased WBCs in CSF, possibility of ventriculitis was raised. CSF culture was negative. Antibiotic was switched to cefepime and vancomycin on 5/28/2021 for 2 weeks.  Vanco was discontinued on 6/9/2021.  On 6/5/2021, sputum grew ESBL so cefepime was switched to meropenem. He was transferred to [facility] on 6/11/21.       7/30 :     Clinically stable  Respiratory status stable - on 30 liters  On restraints  No new issues noted  Discussed with wife on phone and answered questions.    Plan to have a family conference on Monday with Hospitalist, palliative care, pulmonary care and neurology and discuss goals of care. It seems like patient's clinical improvement has  plateaued.      Assessment & Plan           Acute on chronic hypoxemic respiratory failure: S/p treatment pseudomonas ESBL and klebsiella pneumonia. Weaning has been slow and the biggest barrier for decannulation is excessive  secretions  Glycopyrrolate was tried and stopped.  Also diuresed him with Lasix for 3 doses with some improvement however that improvement seems to have stabilized or plateaued.  Speaking valve is being attempted in short trials but does aspirate with blue dye test so needs to be only with speech/RT present.  Currently on phase 2 weaning with TM/PMV during day and is off the vent at night.   - Continue to wean per RT and pulmonary.   - Continue  metaneb, acetylcysteine, and 3% saline nebs   - Continue Scopolamine (started 7/20/2021)    Ventilator associated pneumonia: Completed antibiotics on July 18.    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 sputum culture 6/19 showed MDR pseudomonas. On Tobramycin nebs 6/24/2021 - 7/9/2021.   - Continue pulmonary toilet, scheduled duonebs    Traumatic brain injury/intracranial hemorrhage: 6/14/21 CT head done for fatigue and read as slight increase in caliber of lateral and third ventricle with possibility of developing communicating hydrocephalus. Discussed with Dr. Casillas (neurosurgeon at [facility]), who did not think there was much change. CT head repeated on 6/16/21, which did not show any change.  - Need follow up with Dr. Martinez with Neurosurgery in 3 months with repeat CTA of head and neck for post-op f/u.    Dysphagia: Patient failed the blue dye test 7/20/21 with immediate aspiration, continue to use tube feeds. Speech therapy following.     Acute metabolic encephalopathy: improved.   - Continue Ritalin and effexor     Anemia: hemoglobin stable. Continue to monitor.     Essential hypertension: BP controlled. On lisinopril    Severe debility, weakness, critical illness, deconditioning, Continue PT/OT    Hypercalcemia - unclear etiology, mild at this point. Parathyroid hormone normal. May be related to relative immobilization as he is walking with therapy but only 1-2 times a day. Monitor.    Insomnia: is on  Ritalin which was decreased to once daily due to insomnia when taken later in day.    - Continue nighttime mirtazapine 7.5 mg scheduled.       Diet: Adult Formula Drip Feeding: Continuous Vital 1.5; Gastrostomy; Goal Rate: 90; mL/hr; Medication - Feeding Tube Flush Frequency: At least 15-30 mL water before and after medication administration and with tube clogging; Amount to Send (Nutrition us...    DVT Prophylaxis: Heparin SQ  Gutierrez Catheter: Not present  Central Lines: None  Code Status: Full Code      Disposition Plan   Expected discharge: to be determined    The patient's care was discussed with the Bedside Nurse and Care Coordinator/.    Isreal Granda MD  Hospitalist Service  [facility]  Securely message with the Vocera Web Console (learn more here)  Text page via [employer] Paging/Directory      ______________________________________________________________________      Data reviewed today: I reviewed all medications, new labs and imaging results over the last 24 hours.     Physical Exam   Vital Signs: Temp: 98.7  F (37.1  C) Temp src: Axillary BP: 135/70 Pulse: 85   Resp: 20 SpO2: 97 % O2 Device: Trach dome Oxygen Delivery: 30 LPM  Weight: 142 lbs 12.8 oz    General appearance: not in acute distress  HEENT: PERRL, EOMI  Lungs: Clear breath sounds in bilateral lung fields  Cardiovascular: Regular rate and rhythm, normal S1-S2  Abdomen: Soft, non tender, no distension  Musculoskeletal: No joint swelling  Skin: No rash and no edema  Neurology: AAOx3, Cranial nerves II - XII normal. Moves bilateral arms. Able to lift bilateral legs off the floor.    Data   Recent Labs   Lab 07/30/21  0912 07/30/21  0628 07/28/21  0601 07/26/21  0614 07/25/21  0705   WBC 6.7  --  10.2  --  10.4   HGB 10.2*  --  10.3*  --  10.5*   MCV 96  --  99  --  98     --  428  --  489*   NA  --  140 139 139  --    POTASSIUM  --  4.5 4.2 4.4  --    CHLORIDE  --  98 97* 98  --    CO2  --  32* 31 29  --    BUN  --   31* 29* 27*  --    CR  --  0.78 0.74 0.72  --    ANIONGAP  --  10 11 12  --    RAGINI  --  10.7* 10.9* 10.7* 10.8*   GLC  --  122 148* 132*  --        Restraint:    Patient pulls his IV line and trach.     Within an hour after restraint an in person face to face assessment was completed at 9:30 AM, including an evaluation of the patient's immediate reaction to the intervention, behavioral assessment and review/assessment of history, drugs and medications, recent labs, etc., and behavioral condition.  The patient experienced: No adverse physical outcome from seclusion/restraint initiation.  The intervention of restraint or seclusion needs to continue.

## 2021-07-30 NOTE — PLAN OF CARE
Problem: Communication Impairment (Artificial Airway)  Goal: Effective Communication  7/28/2021 0012 by Hemalatha Kolb, RT  Outcome: Improving  7/28/2021 0009 by Hemalatha Kolb, RT  Outcome: Improving     Problem: Device-Related Complication Risk (Artificial Airway)  Goal: Optimal Device Function  Outcome: Improving     Problem: Skin and Tissue Injury (Artificial Airway)  Goal: Absence of Device-Related Skin or Tissue Injury  Outcome: Improving   RT PROGRESS NOTE     DATA:     CURRENT SETTINGS:             TRACH TYPE / SIZE:  6 Bivona 7/28             MODE:   TM with cuff infalted             FIO2:   30L/30%     ACTION:             THERAPIES:   [place]-neb/mucomyst/saline bid/duo-neb q6             SUCTION:                           FREQUENCY:   x4                        AMOUNT:   large/moderate x2/copious                        CONSISTENCY:   thick                         COLOR:   white/yellow              SPONTANEOUS COUGH EFFORT/STRENGTH OF EFFORT (not elicited by suctioning): yes/strong                              WEANING PHASE:   2 with cuff up @ NOC                        WEAN MODE:    TM/PMV                        WEAN TIME:   4:08                        END WEAN REASON:   PMV removed per order     RESPONSE:             BS:   Clear/diminished              VITAL SIGNS:   Sat 93-99%, HR 81-85, RR 19-24             EMOTIONAL NEEDS / CONCERNS:                  RISK FOR SELF DECANNULATION:  Yes                        RISK DUE TO:  confusion                        INTERVENTION/S IN PLACE IS/ARE:  bilateral mitts in place       NOTE / PLAN:   Cont with plan. PMV trails daily every 4hrs bid per order.

## 2021-07-30 NOTE — PROGRESS NOTES
RN Case Manager note:    Coordinating a conference w/wife, hospitalist, neurology and palliative.  Neurology was not available today and need to arrange on Monday when Neuro doc is available.    Jacqueline Vieyra RN

## 2021-07-30 NOTE — PLAN OF CARE
Problem: Adult Inpatient Plan of Care  Goal: Plan of Care Review  Outcome: No Change     Vital signs stable. Pt denied pain. No PRNs given  All care needs met.

## 2021-07-30 NOTE — PLAN OF CARE
Problem: Adult Inpatient Plan of Care  Goal: Patient-Specific Goal (Individualized)  Outcome: Improving  Flowsheets (Taken 7/29/2021 1900)  Individualized Care Needs: (Ipad on by 8:30 every morning with glasses. Up bid in chair) ipad on by 8:30  Anxieties, Fears or Concerns: patient unable to verbalize  Patient-Specific Goals (Include Timeframe): family is eagerly anticipating having Dayron home with them again     Problem: Adult Inpatient Plan of Care  Goal: Absence of Hospital-Acquired Illness or Injury  Intervention: Prevent Skin Injury  Recent Flowsheet Documentation  Taken 7/29/2021 1800 by Latricia Villatoro, RN  Body Position: (Up to chair) other (see comments)  Taken 7/29/2021 1600 by Latricia Villatoro, RN  Body Position:    left    turned    heels elevated    side-lying 30 degrees     Problem: Adult Inpatient Plan of Care  Goal: Absence of Hospital-Acquired Illness or Injury  Intervention: Prevent Infection  Recent Flowsheet Documentation  Taken 7/29/2021 1600 by Latricia Villatoro, RN  Infection Prevention:    equipment surfaces disinfected    hand hygiene promoted

## 2021-07-30 NOTE — PROVIDER NOTIFICATION
07/30/21 1614   Respiratory Secretions Assessment   Secretions Characteristics thick;tenacious     Patient has a strong cough reflex and can clear much of his secretions but he often requires trach suction to assist him to completely clear his airway.  He commonly will cough and have secretions in his mouth that he then swallows before having oral suction.    Latricia Villatoro RN

## 2021-07-30 NOTE — PROGRESS NOTES
Asked to provide pulmonary prognosis by palliative care team.  Chart reviewed and prior pulmonary notes reviewed.    Mr. Neri has chronic hypoxic respiratory failure secondary to his neurologic compromise with resultant atelectasis from weakness, difficult secretion management with airway plugging.  His case is also complicated by pseudomonal pneumonia with airway colonization that has not been eradicated with antibiotics.  This requires inhaled tobramycin to remain at bay.   He has been stable on his current oxygen flow for at least two weeks without the ability to titrate further.  He has progressed and had a reassuring blue dye test this week, but his cognition prevents help with cares and airway secretion management.  I suspect his pulmonary status could improve more if his cognition improves more.  His neurologic prognosis to be discussed by neurology.      Charles Jimenes MD PhD  [facility] Pulmonary Critical Care

## 2021-07-30 NOTE — PLAN OF CARE
Problem: Restraint for Non-Violent/Non-Self-Destructive Behavior  Goal: Prevent/Manage Potential Problems  Description: Maintain safety of patient and others during period of restraint.  Promote psychological and physical wellbeing.  Prevent injury to skin and involved body parts.  Promote nutrition, hydration, and elimination.  Outcome: No Change       Bilateral mitts on, monitored every two hours.  Problem: Adult Inpatient Plan of Care  Goal: Absence of Hospital-Acquired Illness or Injury  Intervention: Identify and Manage Fall Risk  Recent Flowsheet Documentation  Taken 7/30/2021 0029 by Kristal Jaramillo, RN  Safety Promotion/Fall Prevention:   bed alarm on   room door open   room near nurse's station   safety round/check completed  Intervention: Prevent Infection  Recent Flowsheet Documentation  Taken 7/30/2021 0029 by Kristal Jaramillo, RN  Infection Prevention: rest/sleep promoted     Problem: Skin and Tissue Injury (Artificial Airway)  Goal: Absence of Device-Related Skin or Tissue Injury  Outcome: No Change

## 2021-07-30 NOTE — PROGRESS NOTES
"[facility] Palliative Care Social Work Note    Assessment   Call to wife Susy to offer emotional support. She is doing well overall. She has had a lot of \"doom and gloom\" conversations recently medical providers. She wants to \"remain positive and give him a fighting chance.\" She feels he has made small improvements and she hopes it will continue. She knows that the \"odds are stacked against him\" but she feels he has made it this far and if/when it is his time, \"God will take him.\" She was calm on the phone throughout our conversation and appears to be coping well. She is focusing on the positive aspects of Matt's care. She feels his therapy has improved and expressed gratitude for all the care he is receiving. She has a strong elaina and good support from her daughter/family which is getting her through this. She appreciated opportunity to talk and welcomes continued calls/support.     Clinical Social Work Interventions  Active/empathetic listening  Validation of feelings   Emotional support   Processing of emotions    Plan of care   PC SW will continue to offer psychosocial/emotional support to pt's wife Susy and dtr Yolanda.    Josephine Dailey, [facility]  Palliative Care   Office # [phone]  "

## 2021-07-31 LAB
BACTERIA ASPIRATE CULT: ABNORMAL
BACTERIA ASPIRATE CULT: ABNORMAL
GRAM STAIN RESULT: ABNORMAL

## 2021-07-31 PROCEDURE — 250N000013 HC RX MED GY IP 250 OP 250 PS 637: Performed by: HOSPITALIST

## 2021-07-31 PROCEDURE — 94668 MNPJ CHEST WALL SBSQ: CPT

## 2021-07-31 PROCEDURE — 94640 AIRWAY INHALATION TREATMENT: CPT

## 2021-07-31 PROCEDURE — 250N000011 HC RX IP 250 OP 636

## 2021-07-31 PROCEDURE — 120N000017 HC R&B RESPIRATORY CARE

## 2021-07-31 PROCEDURE — 94640 AIRWAY INHALATION TREATMENT: CPT | Mod: 76

## 2021-07-31 PROCEDURE — 250N000013 HC RX MED GY IP 250 OP 250 PS 637

## 2021-07-31 PROCEDURE — 99207 PR CDG-CUT & PASTE-POTENTIAL IMPACT ON LEVEL: CPT | Performed by: INTERNAL MEDICINE

## 2021-07-31 PROCEDURE — 94660 CPAP INITIATION&MGMT: CPT

## 2021-07-31 PROCEDURE — 999N000009 HC STATISTIC AIRWAY CARE

## 2021-07-31 PROCEDURE — 999N000123 HC STATISTIC OXYGEN O2DAILY TECH TIME

## 2021-07-31 PROCEDURE — 94664 DEMO&/EVAL PT USE INHALER: CPT

## 2021-07-31 PROCEDURE — 250N000013 HC RX MED GY IP 250 OP 250 PS 637: Performed by: INTERNAL MEDICINE

## 2021-07-31 PROCEDURE — 999N000157 HC STATISTIC RCP TIME EA 10 MIN

## 2021-07-31 PROCEDURE — 99233 SBSQ HOSP IP/OBS HIGH 50: CPT | Performed by: INTERNAL MEDICINE

## 2021-07-31 PROCEDURE — 250N000009 HC RX 250: Performed by: INTERNAL MEDICINE

## 2021-07-31 RX ADMIN — ACETYLCYSTEINE 2 ML: 200 SOLUTION ORAL; RESPIRATORY (INHALATION) at 09:09

## 2021-07-31 RX ADMIN — IPRATROPIUM BROMIDE AND ALBUTEROL SULFATE 3 ML: 2.5; .5 SOLUTION RESPIRATORY (INHALATION) at 19:55

## 2021-07-31 RX ADMIN — VENLAFAXINE 37.5 MG: 37.5 TABLET ORAL at 13:39

## 2021-07-31 RX ADMIN — ACETAMINOPHEN ORAL SOLUTION 650 MG: 650 SOLUTION ORAL at 13:39

## 2021-07-31 RX ADMIN — MIRTAZAPINE 7.5 MG: 7.5 TABLET ORAL at 22:08

## 2021-07-31 RX ADMIN — Medication 3 PACKET: at 13:40

## 2021-07-31 RX ADMIN — FAMOTIDINE 20 MG: 20 TABLET, FILM COATED ORAL at 22:05

## 2021-07-31 RX ADMIN — HEPARIN SODIUM 5000 UNITS: 5000 INJECTION, SOLUTION INTRAVENOUS; SUBCUTANEOUS at 22:08

## 2021-07-31 RX ADMIN — METHYLPHENIDATE HYDROCHLORIDE 10 MG: 10 TABLET ORAL at 06:41

## 2021-07-31 RX ADMIN — SODIUM CHLORIDE 30 MG/ML INHALATION SOLUTION 3 ML: 30 SOLUTION INHALANT at 19:54

## 2021-07-31 RX ADMIN — HEPARIN SODIUM 5000 UNITS: 5000 INJECTION, SOLUTION INTRAVENOUS; SUBCUTANEOUS at 13:40

## 2021-07-31 RX ADMIN — SODIUM CHLORIDE 30 MG/ML INHALATION SOLUTION 3 ML: 30 SOLUTION INHALANT at 09:07

## 2021-07-31 RX ADMIN — IPRATROPIUM BROMIDE AND ALBUTEROL SULFATE 3 ML: 2.5; .5 SOLUTION RESPIRATORY (INHALATION) at 09:08

## 2021-07-31 RX ADMIN — ANORECTAL OINTMENT: 15.7; .44; 24; 20.6 OINTMENT TOPICAL at 08:35

## 2021-07-31 RX ADMIN — VENLAFAXINE 37.5 MG: 37.5 TABLET ORAL at 17:58

## 2021-07-31 RX ADMIN — FAMOTIDINE 20 MG: 20 TABLET, FILM COATED ORAL at 08:35

## 2021-07-31 RX ADMIN — ANORECTAL OINTMENT: 15.7; .44; 24; 20.6 OINTMENT TOPICAL at 13:40

## 2021-07-31 RX ADMIN — ANORECTAL OINTMENT: 15.7; .44; 24; 20.6 OINTMENT TOPICAL at 22:29

## 2021-07-31 RX ADMIN — VENLAFAXINE 37.5 MG: 37.5 TABLET ORAL at 08:35

## 2021-07-31 RX ADMIN — ACETYLCYSTEINE 2 ML: 200 SOLUTION ORAL; RESPIRATORY (INHALATION) at 19:55

## 2021-07-31 RX ADMIN — BISACODYL 10 MG: 10 SUPPOSITORY RECTAL at 06:50

## 2021-07-31 RX ADMIN — Medication 3 PACKET: at 08:40

## 2021-07-31 RX ADMIN — LISINOPRIL 40 MG: 20 TABLET ORAL at 21:54

## 2021-07-31 RX ADMIN — Medication 3 PACKET: at 22:24

## 2021-07-31 RX ADMIN — HEPARIN SODIUM 5000 UNITS: 5000 INJECTION, SOLUTION INTRAVENOUS; SUBCUTANEOUS at 06:41

## 2021-07-31 ASSESSMENT — MIFFLIN-ST. JEOR: SCORE: 1442.78

## 2021-07-31 NOTE — PLAN OF CARE
NOTE / PLAN:   Continue current     Documentation  Taken 7/31/2021 1400 by Tye Samayoa, RT  Airway Safety Measures: all equipment/monitors on and audible  Taken 7/31/2021 0909 by Tye Samayoa, RT  Airway Safety Measures: all equipment/monitors on and audible     Problem: Inability to Wean (Mechanical Ventilation, Invasive)  Goal: Mechanical Ventilation Liberation  Intervention: Promote Extubation and Mechanical Ventilation Liberation  Recent Flowsheet Documentation  Taken 7/31/2021 0909 by Tye Samayoa, RT  Environmental Support: calm environment promoted     Problem: Anxiety  Goal: Anxiety Reduction or Resolution  Intervention: Promote Anxiety Reduction  Recent Flowsheet Documentation  Taken 7/31/2021 0909 by Tye Samayoa, RT  Supportive Measures: positive reinforcement provided     Problem: Device-Related Complication Risk (Mechanical Ventilation, Invasive)  Goal: Optimal Device Function  Intervention: Optimize Device Care and Function  Recent RT PROGRESS NOTE     DATA:     CURRENT SETTINGS: tm             TRACH TYPE / SIZE: Bivona, 6             MODE:   tm heated heater             FIO2:   32%     ACTION:             THERAPIES:   Mucomyst/Saline, and Duo-Neb twice a day             SUCTION: yes                          FREQUENCY:   x5                        AMOUNT:   large                         CONSISTENCY:   thick                         COLOR:   yellow              SPONTANEOUS COUGH EFFORT/STRENGTH OF EFFORT (not elicited by suctioning): weak                              WEANING PHASE:   tm                         WEAN MODE:    phase two vent pathway                WEAN TIME:   continuous                END WEAN REASON:   ongoing     RESPONSE:             BS:   coarse              VITAL SIGNS:   97/110/22-28             EMOTIONAL NEEDS / CONCERNS:  yes               RISK FOR SELF DECANNULATION:  Yes/confusion                        RISK DUE TO:  confusion                         INTERVENTION/S IN PLACE IS/ARE:  Hand mittens

## 2021-07-31 NOTE — PLAN OF CARE
Problem: Adult Inpatient Plan of Care  Goal: Plan of Care Review  Outcome: No Change       Pt slept soundly t/o majority of the night. Wears bilateral hand mitts to prevent pt from pulling/tugging @ lines/tubes. Has tolerated new TF formula thus far, rate increased to 40 ml during the night. Pt awakens & opens eyes by end of night shift. He makes some attempts to talk. Given PRN Bisacodyl suppository for constipation, performed digital stimulation on pt. Pt expels some gas, note smears of soft stool on glove afterwards.

## 2021-07-31 NOTE — PLAN OF CARE
Problem: Communication Impairment (Artificial Airway)  Goal: Effective Communication  Outcome: Improving     Problem: Device-Related Complication Risk (Artificial Airway)  Goal: Optimal Device Function  Outcome: Improving     Problem: Skin and Tissue Injury (Artificial Airway)  Goal: Absence of Device-Related Skin or Tissue Injury  Outcome: Improving   RT PROGRESS NOTE     DATA:     CURRENT SETTINGS:               TRACH TYPE / SIZE: #6 BIVONA TTS Changed on 7/28/21             MODE:  HFTM/Cuff up             FIO2: 30%/30     ACTION:             THERAPIES: Duo neb, Mucomyst / Sodium chloride neb BID, [place] Neb                 SUCTION:                           FREQUENCY: x 3                        AMOUNT: large                        CONSISTENCY: thick                        COLOR:  white             SPONTANEOUS COUGH EFFORT/STRENGTH OF EFFORT (not elicited by suctioning): Fair                               WEANING PHASE: 2                        WEAN MODE: PMV                         WEAN TIME: 4 hrs, hours during the day                        END WEAN REASON:       RESPONSE:             BS:  coarse             VITAL SIGNS: Sat  95-97%, HR 76-83, RR 20-22             EMOTIONAL NEEDS / CONCERNS:confused               RISK FOR SELF DECANNULATION: yes                        RISK DUE TO:  Confused                         INTERVENTION/S IN PLACE IS/ARE: mitten inplace      NOTE / PLAN: continue current plan of care

## 2021-07-31 NOTE — PROGRESS NOTES
[facility]    Medicine Progress Note - Hospitalist Service       Date of Admission:  6/11/2021    Summary:  Dayron Neri is a 70 year male with h/o HTN who presented to ED on 5/15/2021 with acute SAH after a fall with unresponsiveness.  He underwent emergent left external ventricular drain placement.  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  On 5/28/2021, patient had persistent cerebral vasospasm, CT head showed bilateral BAYLEE infarct and was treated with milrinone and verapamil.  On 6/1/2021, angiogram showed no evidence of vasospasm.  Extubated on 6/3/2021.  6/6/2021 he was reintubated due to hypoxia.  On 6/7/2021, patient underwent tracheostomy and PEG tube placement.  EVD removed 6/9/2021.   Patient was treated for possible pneumonia with Unasyn on 5/20/2021, It was switched to ceftriaxone the next day for sputum culture growing Klebsiella. On 5/23, sputum culture also grew Pseudomonas. Antibiotic was switched to Zosyn.  Due to increased WBCs in CSF, possibility of ventriculitis was raised. CSF culture was negative. Antibiotic was switched to cefepime and vancomycin on 5/28/2021 for 2 weeks.  Vanco was discontinued on 6/9/2021.  On 6/5/2021, sputum grew ESBL so cefepime was switched to meropenem. He was transferred to [facility] on 6/11/21.       7/31 :     Clinically stable  Respiratory status stable - on 30 liters  On restraints  No new issues noted  Discussed with wife on phone and answered questions.    Plan to have a family conference on Monday with Hospitalist, palliative care, pulmonary care and neurology and discuss goals of care. It seems like patient's clinical improvement has  plateaued.      Assessment & Plan           Acute on chronic hypoxemic respiratory failure: S/p treatment pseudomonas ESBL and klebsiella pneumonia. Weaning has been slow and the biggest barrier for decannulation is excessive  secretions  Glycopyrrolate was tried and stopped.  Also diuresed him with Lasix for 3 doses with some improvement however that improvement seems to have stabilized or plateaued.  Speaking valve is being attempted in short trials but does aspirate with blue dye test so needs to be only with speech/RT present.  Currently on phase 2 weaning with TM/PMV during day and is off the vent at night.   - Continue to wean per RT and pulmonary.   - Continue  metaneb, acetylcysteine, and 3% saline nebs   - Continue Scopolamine (started 7/20/2021)    Ventilator associated pneumonia: Completed antibiotics on July 18.    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 sputum culture 6/19 showed MDR pseudomonas. On Tobramycin nebs 6/24/2021 - 7/9/2021.   - Continue pulmonary toilet, scheduled duonebs    Traumatic brain injury/intracranial hemorrhage: 6/14/21 CT head done for fatigue and read as slight increase in caliber of lateral and third ventricle with possibility of developing communicating hydrocephalus. Discussed with Dr. Casillas (neurosurgeon at [facility]), who did not think there was much change. CT head repeated on 6/16/21, which did not show any change.  - Need follow up with Dr. Martinez with Neurosurgery in 3 months with repeat CTA of head and neck for post-op f/u.    Dysphagia: Patient failed the blue dye test 7/20/21 with immediate aspiration, continue to use tube feeds. Speech therapy following.     Acute metabolic encephalopathy: improved.   - Continue Ritalin and effexor     Anemia: hemoglobin stable. Continue to monitor.     Essential hypertension: BP controlled. On lisinopril    Severe debility, weakness, critical illness, deconditioning, Continue PT/OT    Hypercalcemia - unclear etiology, mild at this point. Parathyroid hormone normal. May be related to relative immobilization as he is walking with therapy but only 1-2 times a day. Monitor.    Insomnia: is on  Ritalin which was decreased to once daily due to insomnia when taken later in day.    - Continue nighttime mirtazapine 7.5 mg scheduled.       Diet: Adult Formula Drip Feeding: Continuous Vital 1.5; Gastrostomy; Goal Rate: 90; mL/hr; Medication - Feeding Tube Flush Frequency: At least 15-30 mL water before and after medication administration and with tube clogging; Amount to Send (Nutrition us...    DVT Prophylaxis: Heparin SQ  Gutierrez Catheter: Not present  Central Lines: None  Code Status: Full Code      Disposition Plan   Expected discharge: to be determined    The patient's care was discussed with the Bedside Nurse and Care Coordinator/.    Isreal Granda MD  Hospitalist Service  [facility]  Securely message with the Vocera Web Console (learn more here)  Text page via [employer] Paging/Directory      ______________________________________________________________________      Data reviewed today: I reviewed all medications, new labs and imaging results over the last 24 hours.     Physical Exam   Vital Signs: Temp: 96.9  F (36.1  C) Temp src: Axillary BP: 122/77 Pulse: 85   Resp: 23 SpO2: 95 % O2 Device: Trach dome Oxygen Delivery: 30 LPM  Weight: 143 lbs 8 oz    General appearance: not in acute distress  HEENT: PERRL, EOMI  Lungs: Clear breath sounds in bilateral lung fields  Cardiovascular: Regular rate and rhythm, normal S1-S2  Abdomen: Soft, non tender, no distension  Musculoskeletal: No joint swelling  Skin: No rash and no edema  Neurology: AAOx3, Cranial nerves II - XII normal. Moves bilateral arms. Able to lift bilateral legs off the floor.    Data   Recent Labs   Lab 07/30/21  0912 07/30/21  0628 07/28/21  0601 07/26/21  0614 07/25/21  0705   WBC 6.7  --  10.2  --  10.4   HGB 10.2*  --  10.3*  --  10.5*   MCV 96  --  99  --  98     --  428  --  489*   NA  --  140 139 139  --    POTASSIUM  --  4.5 4.2 4.4  --    CHLORIDE  --  98 97* 98  --    CO2  --  32* 31 29  --    BUN  --  31*  29* 27*  --    CR  --  0.78 0.74 0.72  --    ANIONGAP  --  10 11 12  --    RAGINI  --  10.7* 10.9* 10.7* 10.8*   GLC  --  122 148* 132*  --        Restraint:    Patient pulls his IV line and trach.     Within an hour after restraint an in person face to face assessment was completed at 9:30 AM, including an evaluation of the patient's immediate reaction to the intervention, behavioral assessment and review/assessment of history, drugs and medications, recent labs, etc., and behavioral condition.  The patient experienced: No adverse physical outcome from seclusion/restraint initiation.  The intervention of restraint or seclusion needs to continue.

## 2021-07-31 NOTE — PLAN OF CARE
Problem: Adult Inpatient Plan of Care  Goal: Plan of Care Review  Outcome: Improving  Flowsheets (Taken 7/30/2021 2028)  Plan of Care Reviewed With: (on ipad in room) spouse  Outcome Summary: Patient is alert and responding to voice.  He will follow simple commands.     Problem: Adult Inpatient Plan of Care  Goal: Patient-Specific Goal (Individualized)  Outcome: Improving  Flowsheets (Taken 7/29/2021 1900)  Individualized Care Needs: (Ipad on by 8:30 every morning with glasses. Up bid in chair) ipad on by 8:30  Anxieties, Fears or Concerns: patient unable to verbalize  Patient-Specific Goals (Include Timeframe): family is eagerly anticipating having Dayron home with them again     Problem: Adult Inpatient Plan of Care  Goal: Absence of Hospital-Acquired Illness or Injury  Intervention: Prevent Skin Injury  Recent Flowsheet Documentation  Taken 7/30/2021 1900 by Latricia Villatoro, RN  Body Position: (in chair)   weight shifting   other (see comments)     Problem: Adult Inpatient Plan of Care  Goal: Absence of Hospital-Acquired Illness or Injury  Intervention: Prevent Infection  Recent Flowsheet Documentation  Taken 7/30/2021 1734 by Latricia Villatoro, RN  Infection Prevention:   hand hygiene promoted   equipment surfaces disinfected   rest/sleep promoted

## 2021-08-01 PROCEDURE — 999N000157 HC STATISTIC RCP TIME EA 10 MIN

## 2021-08-01 PROCEDURE — 250N000009 HC RX 250: Performed by: INTERNAL MEDICINE

## 2021-08-01 PROCEDURE — 120N000017 HC R&B RESPIRATORY CARE

## 2021-08-01 PROCEDURE — 250N000013 HC RX MED GY IP 250 OP 250 PS 637: Performed by: INTERNAL MEDICINE

## 2021-08-01 PROCEDURE — 999N000009 HC STATISTIC AIRWAY CARE

## 2021-08-01 PROCEDURE — 94660 CPAP INITIATION&MGMT: CPT

## 2021-08-01 PROCEDURE — 250N000011 HC RX IP 250 OP 636

## 2021-08-01 PROCEDURE — 94668 MNPJ CHEST WALL SBSQ: CPT

## 2021-08-01 PROCEDURE — 250N000013 HC RX MED GY IP 250 OP 250 PS 637: Performed by: HOSPITALIST

## 2021-08-01 PROCEDURE — 250N000013 HC RX MED GY IP 250 OP 250 PS 637

## 2021-08-01 PROCEDURE — 94640 AIRWAY INHALATION TREATMENT: CPT

## 2021-08-01 PROCEDURE — 94640 AIRWAY INHALATION TREATMENT: CPT | Mod: 76

## 2021-08-01 PROCEDURE — 94667 MNPJ CHEST WALL 1ST: CPT

## 2021-08-01 PROCEDURE — 99233 SBSQ HOSP IP/OBS HIGH 50: CPT | Performed by: INTERNAL MEDICINE

## 2021-08-01 RX ADMIN — ANORECTAL OINTMENT: 15.7; .44; 24; 20.6 OINTMENT TOPICAL at 13:56

## 2021-08-01 RX ADMIN — VENLAFAXINE 37.5 MG: 37.5 TABLET ORAL at 16:57

## 2021-08-01 RX ADMIN — ANORECTAL OINTMENT: 15.7; .44; 24; 20.6 OINTMENT TOPICAL at 13:55

## 2021-08-01 RX ADMIN — HEPARIN SODIUM 5000 UNITS: 5000 INJECTION, SOLUTION INTRAVENOUS; SUBCUTANEOUS at 13:56

## 2021-08-01 RX ADMIN — IPRATROPIUM BROMIDE AND ALBUTEROL SULFATE 3 ML: 2.5; .5 SOLUTION RESPIRATORY (INHALATION) at 08:01

## 2021-08-01 RX ADMIN — ACETYLCYSTEINE 2 ML: 200 SOLUTION ORAL; RESPIRATORY (INHALATION) at 20:15

## 2021-08-01 RX ADMIN — VENLAFAXINE 37.5 MG: 37.5 TABLET ORAL at 08:25

## 2021-08-01 RX ADMIN — MIRTAZAPINE 7.5 MG: 7.5 TABLET ORAL at 20:19

## 2021-08-01 RX ADMIN — FAMOTIDINE 20 MG: 20 TABLET, FILM COATED ORAL at 08:25

## 2021-08-01 RX ADMIN — ANORECTAL OINTMENT: 15.7; .44; 24; 20.6 OINTMENT TOPICAL at 22:41

## 2021-08-01 RX ADMIN — SCOPALAMINE 1 PATCH: 1 PATCH, EXTENDED RELEASE TRANSDERMAL at 13:55

## 2021-08-01 RX ADMIN — SODIUM CHLORIDE 30 MG/ML INHALATION SOLUTION 3 ML: 30 SOLUTION INHALANT at 08:01

## 2021-08-01 RX ADMIN — HEPARIN SODIUM 5000 UNITS: 5000 INJECTION, SOLUTION INTRAVENOUS; SUBCUTANEOUS at 22:42

## 2021-08-01 RX ADMIN — Medication 3 PACKET: at 13:56

## 2021-08-01 RX ADMIN — ANORECTAL OINTMENT: 15.7; .44; 24; 20.6 OINTMENT TOPICAL at 08:26

## 2021-08-01 RX ADMIN — VENLAFAXINE 37.5 MG: 37.5 TABLET ORAL at 11:05

## 2021-08-01 RX ADMIN — Medication 3 PACKET: at 08:25

## 2021-08-01 RX ADMIN — ACETYLCYSTEINE 2 ML: 200 SOLUTION ORAL; RESPIRATORY (INHALATION) at 08:00

## 2021-08-01 RX ADMIN — METHYLPHENIDATE HYDROCHLORIDE 10 MG: 10 TABLET ORAL at 05:48

## 2021-08-01 RX ADMIN — SODIUM CHLORIDE 30 MG/ML INHALATION SOLUTION 3 ML: 30 SOLUTION INHALANT at 20:15

## 2021-08-01 RX ADMIN — IPRATROPIUM BROMIDE AND ALBUTEROL SULFATE 3 ML: 2.5; .5 SOLUTION RESPIRATORY (INHALATION) at 20:15

## 2021-08-01 RX ADMIN — FAMOTIDINE 20 MG: 20 TABLET, FILM COATED ORAL at 20:19

## 2021-08-01 RX ADMIN — LISINOPRIL 40 MG: 20 TABLET ORAL at 20:27

## 2021-08-01 RX ADMIN — ACETAMINOPHEN ORAL SOLUTION 650 MG: 650 SOLUTION ORAL at 11:05

## 2021-08-01 RX ADMIN — HEPARIN SODIUM 5000 UNITS: 5000 INJECTION, SOLUTION INTRAVENOUS; SUBCUTANEOUS at 05:47

## 2021-08-01 ASSESSMENT — MIFFLIN-ST. JEOR
SCORE: 1502.2
SCORE: 1466.37
SCORE: 1468.58

## 2021-08-01 NOTE — PLAN OF CARE
Problem: Communication Impairment (Artificial Airway)  Goal: Effective Communication  Outcome: Improving     Problem: Device-Related Complication Risk (Artificial Airway)  Goal: Optimal Device Function  Outcome: Improving     Problem: Skin and Tissue Injury (Artificial Airway)  Goal: Absence of Device-Related Skin or Tissue Injury  Outcome: Improving   RT PROGRESS NOTE     DATA:     CURRENT SETTINGS:               TRACH TYPE / SIZE: #6 BIVONA TTS Changed on 7/28/21             MODE: TM             FIO2:  30%@30 L/MIN     ACTION:             THERAPIES: Duoneb q6hr, Mucomyst BID , Sodium Chloride neb BID, MetaNeb BID                SUCTION:                           FREQUENCY: x5                        AMOUNT:  moderate to copious                         CONSISTENCY: thick/thin                        COLOR:  White/tan             SPONTANEOUS COUGH EFFORT/STRENGTH OF EFFORT (not elicited by suctioning): Fair                               WEANING PHASE: 2                        WEAN MODE: Trial of speaking valve up to 4 hours twice daily as tolerated.                        WEAN TIME:  4 HRS (day shift ) and wearing Since 15:50 tolearating                        END WEAN REASON: o     RESPONSE:             BS:  Clear Decreased              VITAL SIGNS: Sat 95-96, HR 83-88, RR 22             EMOTIONAL NEEDS / CONCERNS:confused               RISK FOR SELF DECANNULATION: yes                        RISK DUE TO:  Confused                         INTERVENTION/S IN PLACE IS/ARE: Bilateral  hand  Mittens      NOTE / PLAN:  Cont. Current plan of care

## 2021-08-01 NOTE — PROGRESS NOTES
[facility]    Medicine Progress Note - Hospitalist Service       Date of Admission:  6/11/2021    Summary:  Dayron Neri is a 70 year male with h/o HTN who presented to ED on 5/15/2021 with acute SAH after a fall with unresponsiveness.  He underwent emergent left external ventricular drain placement.  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  On 5/28/2021, patient had persistent cerebral vasospasm, CT head showed bilateral BAYLEE infarct and was treated with milrinone and verapamil.  On 6/1/2021, angiogram showed no evidence of vasospasm.  Extubated on 6/3/2021.  6/6/2021 he was reintubated due to hypoxia.  On 6/7/2021, patient underwent tracheostomy and PEG tube placement.  EVD removed 6/9/2021.   Patient was treated for possible pneumonia with Unasyn on 5/20/2021, It was switched to ceftriaxone the next day for sputum culture growing Klebsiella. On 5/23, sputum culture also grew Pseudomonas. Antibiotic was switched to Zosyn.  Due to increased WBCs in CSF, possibility of ventriculitis was raised. CSF culture was negative. Antibiotic was switched to cefepime and vancomycin on 5/28/2021 for 2 weeks.  Vanco was discontinued on 6/9/2021.  On 6/5/2021, sputum grew ESBL so cefepime was switched to meropenem. He was transferred to [facility] on 6/11/21.       Assessment & Plan           Acute on chronic hypoxemic respiratory failure: S/p treatment pseudomonas ESBL and klebsiella pneumonia. Has excessive secretions. Weaning has been slow.   - Currently on phase 2 weaning with HFTM/PMV as tolerated.  He has been stable on his current oxygen flow for at least two weeks without the ability to titrate further. Per pulmonary, his cognition prevents him from improving further. His pulmonary status could improve more if his cognition improves.   - Continue to wean per RT and pulmonary.   - Airway secretion: Glycopyrrolate was tried and  stopped.  Also diuresed him with Lasix for 3 doses with some improvement however that improvement seems to have stabilized or plateaued.  Currently on Scopolamine (started 7/20/2021)  - Continue  metaneb, acetylcysteine, and 3% saline nebs     Traumatic brain injury/intracranial hemorrhage/acute ischemic stroke: had ruptured posterior communicating aneurysm s/p craniotomy with clipping of PCOM aneurysm and bilateral BAYLEE infarct in May 2021. Last CT head done on 7/1/21, which shows resolution of previous multicompartment hemorrhage in the right frontal lobe and the ventricles.   - Need follow up with Dr. Martinez with Neurosurgery in 3 months with repeat CTA of head and neck for post-op f/u (around mid August).  - Neurology consulted for prognosis evaluation    Ventilator associated pneumonia: Completed antibiotics on July 18.    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 sputum culture 6/19 showed MDR pseudomonas. On Tobramycin nebs 6/24/2021 - 7/9/2021.   - Continue pulmonary toilet, scheduled duonebs    Dysphagia: On tube feeds. Speech therapy following.     Acute metabolic encephalopathy: slowly improved.   - Continue Ritalin and effexor     Anemia: hemoglobin stable. Continue to monitor.     Essential hypertension: BP controlled. On lisinopril    Severe debility, weakness, critical illness, deconditioning: Continue PT/OT    Hypercalcemia: unclear etiology, mild at this point. Parathyroid hormone normal. May be related to relative immobilization. Continue to monitor.    Insomnia: Continue nighttime mirtazapine 7.5 mg scheduled.       Diet: Adult Formula Drip Feeding: Continuous Vital 1.5; Gastrostomy; Goal Rate: 90; mL/hr; Medication - Feeding Tube Flush Frequency: At least 15-30 mL water before and after medication administration and with tube clogging; Amount to Send (Nutrition us...    DVT Prophylaxis: Heparin SQ  Gutierrez Catheter: Not  present  Central Lines: None  Code Status: Full Code      Disposition Plan   Expected discharge: to be determined    The patient's care was discussed with the Bedside Nurse and Care Coordinator/.    Yazmin Hooper MD  Hospitalist Service  [facility]  Securely message with the Vocera Web Console (learn more here)  Text page via [employer] Paging/Directory      ______________________________________________________________________    Interval History   When seen nd examined this morning, patient was sitting up in the chair. His wife and daughter were by the bedside. Patient appeared confused. I updated his wife about patient's progression. His wife states that she is prepared for patient's slow progression and she would like to give patient a chance to see how he does for the next few months.     Data reviewed today: I reviewed all medications, new labs and imaging results over the last 24 hours.     Physical Exam   Vital Signs: Temp: 97.7  F (36.5  C) Temp src: Axillary BP: 107/71 Pulse: 83   Resp: 23 SpO2: 96 % O2 Device: Trach dome Oxygen Delivery: 30 LPM  Weight: 149 lbs 3 oz    General appearance: not in acute distress  HEENT: PERRL, EOMI. Trach in place.  Lungs: Clear breath sounds in bilateral lung fields  Cardiovascular: Regular rate and rhythm, normal S1-S2  Abdomen: Soft, non tender, no distension  Musculoskeletal: No joint swelling  Skin: No rash and no edema  Neurology: Awake and alert. Can say simple words slowly. Moves bilateral arms. Able to lift bilateral legs off the floor.      Data   Recent Labs   Lab 07/30/21  0912 07/30/21  0628 07/28/21  0601 07/26/21  0614   WBC 6.7  --  10.2  --    HGB 10.2*  --  10.3*  --    MCV 96  --  99  --      --  428  --    NA  --  140 139 139   POTASSIUM  --  4.5 4.2 4.4   CHLORIDE  --  98 97* 98   CO2  --  32* 31 29   BUN  --  31* 29* 27*   CR  --  0.78 0.74 0.72   ANIONGAP  --  10 11 12   RAGINI  --  10.7* 10.9* 10.7*   GLC  --  122 148* 132*        Restraint:    Patient pulls his IV line and trach.     Within an hour after restraint an in person face to face assessment was completed, including an evaluation of the patient's immediate reaction to the intervention, behavioral assessment and review/assessment of history, drugs and medications, recent labs, etc., and behavioral condition.  The patient experienced: No adverse physical outcome from seclusion/restraint initiation.  The intervention of restraint or seclusion needs to continue.

## 2021-08-01 NOTE — PLAN OF CARE
Patient has been stable today and using voice to communicate. Was having pain today in hips and knees and tylenol seems to help. Continue to monitor.

## 2021-08-01 NOTE — PLAN OF CARE
Problem: Anxiety  Goal: Anxiety Reduction or Resolution  Outcome: Improving     Problem: Skin and Tissue Injury (Artificial Airway)  Goal: Absence of Device-Related Skin or Tissue Injury  Outcome: Improving   Patient is stable, no distress noted on this shift.

## 2021-08-02 ENCOUNTER — APPOINTMENT (OUTPATIENT)
Dept: PHYSICAL THERAPY | Facility: CLINIC | Age: 71
DRG: 207 | End: 2021-08-02
Attending: HOSPITALIST
Payer: COMMERCIAL

## 2021-08-02 ENCOUNTER — APPOINTMENT (OUTPATIENT)
Dept: SPEECH THERAPY | Facility: CLINIC | Age: 71
DRG: 207 | End: 2021-08-02
Attending: HOSPITALIST
Payer: COMMERCIAL

## 2021-08-02 LAB
ANION GAP SERPL CALCULATED.3IONS-SCNC: 9 MMOL/L (ref 5–18)
BASOPHILS # BLD AUTO: 0 10E3/UL (ref 0–0.2)
BASOPHILS NFR BLD AUTO: 0 %
BUN SERPL-MCNC: 34 MG/DL (ref 8–22)
CALCIUM SERPL-MCNC: 11 MG/DL (ref 8.5–10.5)
CHLORIDE BLD-SCNC: 98 MMOL/L (ref 98–107)
CO2 SERPL-SCNC: 32 MMOL/L (ref 22–31)
CREAT SERPL-MCNC: 0.81 MG/DL (ref 0.7–1.3)
EOSINOPHIL # BLD AUTO: 0.1 10E3/UL (ref 0–0.7)
EOSINOPHIL NFR BLD AUTO: 1 %
ERYTHROCYTE [DISTWIDTH] IN BLOOD BY AUTOMATED COUNT: 14 % (ref 10–15)
GFR SERPL CREATININE-BSD FRML MDRD: >90 ML/MIN/1.73M2
GLUCOSE BLD-MCNC: 123 MG/DL (ref 70–125)
HCT VFR BLD AUTO: 32.9 % (ref 40–53)
HGB BLD-MCNC: 10.7 G/DL (ref 13.3–17.7)
IMM GRANULOCYTES # BLD: 0.1 10E3/UL
IMM GRANULOCYTES NFR BLD: 1 %
LYMPHOCYTES # BLD AUTO: 1.4 10E3/UL (ref 0.8–5.3)
LYMPHOCYTES NFR BLD AUTO: 17 %
MCH RBC QN AUTO: 30.8 PG (ref 26.5–33)
MCHC RBC AUTO-ENTMCNC: 32.5 G/DL (ref 31.5–36.5)
MCV RBC AUTO: 95 FL (ref 78–100)
MONOCYTES # BLD AUTO: 0.7 10E3/UL (ref 0–1.3)
MONOCYTES NFR BLD AUTO: 9 %
NEUTROPHILS # BLD AUTO: 5.7 10E3/UL (ref 1.6–8.3)
NEUTROPHILS NFR BLD AUTO: 72 %
NRBC # BLD AUTO: 0 10E3/UL
NRBC BLD AUTO-RTO: 0 /100
PLATELET # BLD AUTO: 323 10E3/UL (ref 150–450)
POTASSIUM BLD-SCNC: 4.6 MMOL/L (ref 3.5–5)
RBC # BLD AUTO: 3.47 10E6/UL (ref 4.4–5.9)
SODIUM SERPL-SCNC: 139 MMOL/L (ref 136–145)
WBC # BLD AUTO: 8 10E3/UL (ref 4–11)

## 2021-08-02 PROCEDURE — 99232 SBSQ HOSP IP/OBS MODERATE 35: CPT | Performed by: INTERNAL MEDICINE

## 2021-08-02 PROCEDURE — 94660 CPAP INITIATION&MGMT: CPT

## 2021-08-02 PROCEDURE — 250N000009 HC RX 250: Performed by: INTERNAL MEDICINE

## 2021-08-02 PROCEDURE — 250N000013 HC RX MED GY IP 250 OP 250 PS 637: Performed by: INTERNAL MEDICINE

## 2021-08-02 PROCEDURE — 999N000157 HC STATISTIC RCP TIME EA 10 MIN

## 2021-08-02 PROCEDURE — 85025 COMPLETE CBC W/AUTO DIFF WBC: CPT | Performed by: INTERNAL MEDICINE

## 2021-08-02 PROCEDURE — 80048 BASIC METABOLIC PNL TOTAL CA: CPT | Performed by: HOSPITALIST

## 2021-08-02 PROCEDURE — 999N000123 HC STATISTIC OXYGEN O2DAILY TECH TIME

## 2021-08-02 PROCEDURE — 36415 COLL VENOUS BLD VENIPUNCTURE: CPT | Performed by: HOSPITALIST

## 2021-08-02 PROCEDURE — 94640 AIRWAY INHALATION TREATMENT: CPT

## 2021-08-02 PROCEDURE — 92526 ORAL FUNCTION THERAPY: CPT | Mod: GN | Performed by: SPEECH-LANGUAGE PATHOLOGIST

## 2021-08-02 PROCEDURE — 250N000013 HC RX MED GY IP 250 OP 250 PS 637

## 2021-08-02 PROCEDURE — 999N000009 HC STATISTIC AIRWAY CARE

## 2021-08-02 PROCEDURE — 94640 AIRWAY INHALATION TREATMENT: CPT | Mod: 76

## 2021-08-02 PROCEDURE — 99207 PR CDG-CODE CATEGORY CHANGED: CPT | Performed by: INTERNAL MEDICINE

## 2021-08-02 PROCEDURE — 97110 THERAPEUTIC EXERCISES: CPT | Mod: GP | Performed by: PHYSICAL THERAPIST

## 2021-08-02 PROCEDURE — 250N000011 HC RX IP 250 OP 636

## 2021-08-02 PROCEDURE — 250N000013 HC RX MED GY IP 250 OP 250 PS 637: Performed by: HOSPITALIST

## 2021-08-02 PROCEDURE — 94668 MNPJ CHEST WALL SBSQ: CPT

## 2021-08-02 PROCEDURE — 94003 VENT MGMT INPAT SUBQ DAY: CPT | Performed by: INTERNAL MEDICINE

## 2021-08-02 PROCEDURE — 120N000017 HC R&B RESPIRATORY CARE

## 2021-08-02 PROCEDURE — 97112 NEUROMUSCULAR REEDUCATION: CPT | Mod: GP | Performed by: PHYSICAL THERAPIST

## 2021-08-02 RX ADMIN — ACETYLCYSTEINE 2 ML: 200 SOLUTION ORAL; RESPIRATORY (INHALATION) at 20:19

## 2021-08-02 RX ADMIN — ANORECTAL OINTMENT: 15.7; .44; 24; 20.6 OINTMENT TOPICAL at 21:40

## 2021-08-02 RX ADMIN — LISINOPRIL 40 MG: 20 TABLET ORAL at 18:11

## 2021-08-02 RX ADMIN — MIRTAZAPINE 7.5 MG: 7.5 TABLET ORAL at 21:39

## 2021-08-02 RX ADMIN — Medication 3 PACKET: at 14:10

## 2021-08-02 RX ADMIN — ACETYLCYSTEINE 2 ML: 200 SOLUTION ORAL; RESPIRATORY (INHALATION) at 07:59

## 2021-08-02 RX ADMIN — HEPARIN SODIUM 5000 UNITS: 5000 INJECTION, SOLUTION INTRAVENOUS; SUBCUTANEOUS at 05:39

## 2021-08-02 RX ADMIN — SODIUM CHLORIDE 30 MG/ML INHALATION SOLUTION 3 ML: 30 SOLUTION INHALANT at 20:19

## 2021-08-02 RX ADMIN — VENLAFAXINE 37.5 MG: 37.5 TABLET ORAL at 14:09

## 2021-08-02 RX ADMIN — Medication 3 PACKET: at 21:39

## 2021-08-02 RX ADMIN — FAMOTIDINE 20 MG: 20 TABLET, FILM COATED ORAL at 21:39

## 2021-08-02 RX ADMIN — HEPARIN SODIUM 5000 UNITS: 5000 INJECTION, SOLUTION INTRAVENOUS; SUBCUTANEOUS at 21:39

## 2021-08-02 RX ADMIN — IPRATROPIUM BROMIDE AND ALBUTEROL SULFATE 3 ML: 2.5; .5 SOLUTION RESPIRATORY (INHALATION) at 20:19

## 2021-08-02 RX ADMIN — IPRATROPIUM BROMIDE AND ALBUTEROL SULFATE 3 ML: 2.5; .5 SOLUTION RESPIRATORY (INHALATION) at 07:59

## 2021-08-02 RX ADMIN — SODIUM CHLORIDE 30 MG/ML INHALATION SOLUTION 3 ML: 30 SOLUTION INHALANT at 08:14

## 2021-08-02 RX ADMIN — Medication 3 PACKET: at 09:01

## 2021-08-02 RX ADMIN — VENLAFAXINE 37.5 MG: 37.5 TABLET ORAL at 09:01

## 2021-08-02 RX ADMIN — HEPARIN SODIUM 5000 UNITS: 5000 INJECTION, SOLUTION INTRAVENOUS; SUBCUTANEOUS at 14:10

## 2021-08-02 RX ADMIN — FAMOTIDINE 20 MG: 20 TABLET, FILM COATED ORAL at 09:01

## 2021-08-02 RX ADMIN — ANORECTAL OINTMENT: 15.7; .44; 24; 20.6 OINTMENT TOPICAL at 09:02

## 2021-08-02 RX ADMIN — VENLAFAXINE 37.5 MG: 37.5 TABLET ORAL at 18:11

## 2021-08-02 RX ADMIN — ANORECTAL OINTMENT: 15.7; .44; 24; 20.6 OINTMENT TOPICAL at 14:10

## 2021-08-02 RX ADMIN — METHYLPHENIDATE HYDROCHLORIDE 10 MG: 10 TABLET ORAL at 05:40

## 2021-08-02 ASSESSMENT — MIFFLIN-ST. JEOR: SCORE: 1490.41

## 2021-08-02 NOTE — PLAN OF CARE
Problem: Anxiety  Goal: Anxiety Reduction or Resolution  Outcome: No Change     Problem: Restraint for Non-Violent/Non-Self-Destructive Behavior  Goal: Prevent/Manage Potential Problems  Description: Maintain safety of patient and others during period of restraint.  Promote psychological and physical wellbeing.  Prevent injury to skin and involved body parts.  Promote nutrition, hydration, and elimination.  Outcome: No Change   Patient  vital sign remain stable throughout the shift, was suctioned x 2 white yellow thick secretion. Patient continue on hand mitt as patient continue to be impulsive.  All other cares done.

## 2021-08-02 NOTE — PROGRESS NOTES
NEUROLOGY FOLLOW-UP NOTE    Asked to see patient again for prognostic purposes.  Due to staffing issues neurology is only providing telephone consult at [facility] at present.    Patient was being followed by neurology and was last seen on 7/9/2021.  He has a complicated history including subarachnoid hemorrhage, BAYLEE strokes, extraventricular drain with waxing and waning mental status.    I have discussed patient's case with , I have explained to her that I agree with Dr. Gaona's note from 7/9/2021 that due to BAYLEE strokes and subarachnoid hemorrhage full extent of neurological recovery cannot be predicted.  Patients do make improvement after a cerebrovascular accident up to a year but maximum improvement is seen in the first 6 months.  Trial of Ritalin is reasonable but no other recommendations from neurology standpoint.    Perico Quick MD

## 2021-08-02 NOTE — PLAN OF CARE
Problem: Communication Impairment (Artificial Airway)  Goal: Effective Communication  Outcome: Improving     Problem: Device-Related Complication Risk (Artificial Airway)  Goal: Optimal Device Function  Outcome: Improving     Problem: Skin and Tissue Injury (Artificial Airway)  Goal: Absence of Device-Related Skin or Tissue Injury  Outcome: Improving     RT PROGRESS NOTE     DATA:     CURRENT SETTINGS:             TRACH TYPE / SIZE:  #6 Bivona placed on 7/28/21             MODE:   HFTM (Cuff up)             FIO2:   30% and 30 lpm     ACTION:             THERAPIES:   DUO NEB Q6             SUCTION:                           FREQUENCY:  X6                        AMOUNT:   Large to copious                        CONSISTENCY:   Thick                        COLOR:   White/yellow             SPONTANEOUS COUGH EFFORT/STRENGTH OF EFFORT (not elicited by suctioning): Yes:Strong                              WEANING PHASE: #2                        WEAN MODE:   HFTM as reji                        WEAN TIME:                           END WEAN REASON:  Cont     RESPONSE:             BS:   Coarse             VITAL SIGNS: SAT 96-99%, HR 84-95 , RR 18-22             EMOTIONAL NEEDS / CONCERNS:  Confuse               RISK FOR SELF DECANNULATION:  Yes                        RISK DUE TO:  Restless                        INTERVENTION/S IN PLACE IS/ARE: Bilateral mitts       NOTE / PLAN:  Pt is on hftm 30% and 30 lpm with cuff up, reji well. Cont weaning 4 hrs pmv bid as reji and keep sat >/=90%

## 2021-08-02 NOTE — PROGRESS NOTES
[facility]    Medicine Progress Note - Hospitalist Service       Date of Admission:  6/11/2021    Summary:  Dayron Neri is a 70 year male with h/o HTN who presented to ED on 5/15/2021 with acute SAH after a fall with unresponsiveness.  He underwent emergent left external ventricular drain placement.  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  On 5/28/2021, patient had persistent cerebral vasospasm, CT head showed bilateral BAYLEE infarct and was treated with milrinone and verapamil.  On 6/1/2021, angiogram showed no evidence of vasospasm.  Extubated on 6/3/2021.  6/6/2021 he was reintubated due to hypoxia.  On 6/7/2021, patient underwent tracheostomy and PEG tube placement.  EVD removed 6/9/2021.   Patient was treated for possible pneumonia with Unasyn on 5/20/2021, It was switched to ceftriaxone the next day for sputum culture growing Klebsiella. On 5/23, sputum culture also grew Pseudomonas. Antibiotic was switched to Zosyn.  Due to increased WBCs in CSF, possibility of ventriculitis was raised. CSF culture was negative. Antibiotic was switched to cefepime and vancomycin on 5/28/2021 for 2 weeks.  Vanco was discontinued on 6/9/2021.  On 6/5/2021, sputum grew ESBL so cefepime was switched to meropenem. He was transferred to [facility] on 6/11/21.       Assessment & Plan           Acute on chronic hypoxemic respiratory failure: S/p treatment pseudomonas ESBL and klebsiella pneumonia. Has excessive secretions. Weaning has been slow.   - Currently on phase 2 weaning with HFTM/PMV as tolerated.  He has been stable on his current oxygen flow for at least two weeks without the ability to titrate further. Per pulmonary, his cognition prevents him from improving further. His pulmonary status could improve more if his cognition improves.   - Continue to wean per RT and pulmonary.   - Airway secretion: Glycopyrrolate was tried and  stopped.  Also diuresed him with Lasix for 3 doses with some improvement however that improvement seems to have stabilized or plateaued.  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 Last CT head done on 7/1/21, which shows resolution of previous multicompartment hemorrhage in the right frontal lobe and the ventricles.   - Need follow up with Dr. Martinez with Neurosurgery in 3 months with repeat CTA of head and neck for post-op f/u (around mid August).  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 sputum culture 6/19 showed MDR pseudomonas. On Tobramycin nebs 6/24/2021 - 7/9/2021.   - Continue pulmonary toilet, scheduled duonebs    Ventilator associated pneumonia: Completed antibiotics on July 18.    Dysphagia: On tube feeds and NPO. Speech therapy following.     Acute metabolic encephalopathy: slowly improved.   - Continue Ritalin and effexor     Anemia: hemoglobin stable. Continue to monitor.     Essential hypertension: BP controlled. On lisinopril    Severe debility, weakness, critical illness, deconditioning: Continue PT/OT    Hypercalcemia: unclear etiology, mild at this point. Parathyroid hormone normal. May be related to relative immobilization. Continue to monitor.    Insomnia: Continue nighttime mirtazapine 7.5 mg scheduled.       Diet: Adult Formula Drip Feeding: Continuous Vital 1.5; Gastrostomy; Goal Rate: 90; mL/hr; Medication - Feeding Tube Flush Frequency: At least 15-30 mL water before and after medication administration and with tube clogging; Amount to Send (Nutrition us...    DVT Prophylaxis: Heparin SQ  Gutierrez Catheter: Not  "present  Central Lines: None  Code Status: Full Code      Disposition Plan   Expected discharge: to be determined    The patient's care was discussed with the Bedside Nurse and Care Coordinator/.    Yazmin Hooper MD  Hospitalist Service  [facility]  Securely message with the Vocera Web Console (learn more here)  Text page via [employer] Paging/Directory      ______________________________________________________________________    Interval History   When seen and examined this morning, patient was sitting up in the chair. His wife and daughter were watching him over the video. When asked \"how are you\", patient then replied \"fine\". When asked \" how are those people watching you over the video\", he did not answer. He was not able to tell his wife's name. He did follow commands to wiggle toes.    Data reviewed today: I reviewed all medications, new labs and imaging results over the last 24 hours.     Physical Exam   Vital Signs: Temp: 98.1  F (36.7  C) Temp src: Axillary BP: 112/73 Pulse: 88   Resp: 26 SpO2: 93 % O2 Device: Trach dome Oxygen Delivery: 30 LPM  Weight: 154 lbs 0 oz    General appearance: not in acute distress  HEENT: PERRL, EOMI. Trach in place.  Lungs: Clear breath sounds in bilateral lung fields  Cardiovascular: Regular rate and rhythm, normal S1-S2  Abdomen: Soft, non tender, no distension  Musculoskeletal: No joint swelling  Skin: No rash and no edema  Neurology: Awake and alert. Confused. Can say simple words slowly. Moves bilateral arms. Able to lift bilateral legs off the floor.      Data   Recent Labs   Lab 08/02/21  0738 07/30/21  0912 07/30/21  0628 07/28/21  0601   WBC 8.0 6.7  --  10.2   HGB 10.7* 10.2*  --  10.3*   MCV 95 96  --  99    373  --  428     --  140 139   POTASSIUM 4.6  --  4.5 4.2   CHLORIDE 98  --  98 97*   CO2 32*  --  32* 31   BUN 34*  --  31* 29*   CR 0.81  --  0.78 0.74   ANIONGAP 9  --  10 11   RAGINI 11.0*  --  10.7* 10.9*     --  122 " 148*       Restraint:    Patient pulls his IV line and trach.     Within an hour after restraint an in person face to face assessment was completed, including an evaluation of the patient's immediate reaction to the intervention, behavioral assessment and review/assessment of history, drugs and medications, recent labs, etc., and behavioral condition.  The patient experienced: No adverse physical outcome from seclusion/restraint initiation.  The intervention of restraint or seclusion needs to continue.

## 2021-08-02 NOTE — PLAN OF CARE
Pt was able to have his speaking valve in for 4 hours today. Pt was able to communicate some of his wants and can speak in a low but clear voice.     The O2 sat finger probe was moved from his finger to his head, in order to promote skin integrity with nubia on.

## 2021-08-02 NOTE — PLAN OF CARE
Problem: Communication Impairment (Artificial Airway)  Goal: Effective Communication  Outcome: Change based on patient need/priority     Problem: Device-Related Complication Risk (Artificial Airway)  Goal: Optimal Device Function  Outcome: No Change       Patient did have episode of desaturation this am during toileting/ BM cares.  Probably a mucous plug. Airway was suctioned x this writer and RT around 10 am.    Updated family on labs and their needs and requests. Wife disappointed that patient has not had any therapy over the weekend and OT was not present today. Charge Nurse away and was going to report to unit manager

## 2021-08-02 NOTE — PROGRESS NOTES
"Pulmonary Medicine Follow up Note    Clinical status discussed today with respiratory therapist.    Chief complaint: Ongoing respiratory failure    Significant change in clinical status during past 24 hours?   More secretions today     FiO2 (%): 30% 30 L/min HFTM    Tracheal secretions: x6, large - copious    Current phase of ventilator weaning pathway: Phase 2    Ventilator weaning results from yesterday:   24 hours trach mask with HFTM 30 L/min 30%, tolerating PMV for 4 hrs BID    Physical exam   /73 (BP Location: Left arm)   Pulse 88   Temp 98.1  F (36.7  C) (Axillary)   Resp 26   Ht 1.803 m (5' 10.98\")   Wt 69.9 kg (154 lb)   SpO2 93%   BMI 21.49 kg/m      Gen: eyes open, appears chronically ill  HEENT: pale conjunctiva, moist mucosa  Neck: s/p trach, changed to 6 bivona  Lungs: CTA anterolaterally  CV: regular, no murmurs or gallops appreciated  Abdomen: soft, NT, BS wnl  Ext: no edema  Neuro: eyes open, trying to vocalize, generalized weakness    IMAGING:  XR CHEST PORT 1 VIEW  LOCATION: [facility]  DATE/TIME: 7/15/2021 7:50 AM  INDICATION: New VAP followup.  COMPARISON: 07/09/2021.  IMPRESSION:   No change in tracheostomy tube. Heart and mediastinal size are stable. There is indistinctness of the pulmonary interstitium, representing either airway inflammation or edema. There is bandlike opacity involving the retrocardiac region of the left lung base, increased from prior study and representing either atelectasis or infectious process. No pleural effusion or pneumothorax. There is some mild elevation of the right hemidiaphragm.    Assessment:  Patient is a 71 yo man with history of hypertension. Patient initially presented on 15-May-2021 to [facility] after being found unresponsive by his wife. Patient was then found to have a subarachnoid hemorrhage secondary to a ruptured aneurysm. Patient was intubated and was started on mannitol, IV antihypertensives and hyperventilation " and patient was then transferred to [facility]. Patient underwent aneurysm clipping as well as placement of extra-ventricular drain from hydrocephalus on 15-May-2021. Extra-ventricular drain would malfunction and would require replacement on 04-Jun-2021 and 06-Jun-2021. Extra-ventricular drain reportedly was removed on 09-Jun-2021. Patient had intra-arterial vasospasm treatment with verapamil on 28-May-2021.  Patient was extubated on 16-May-2021 but had to be reintubated on 19-May-2021. Patient would be extubated on 03-Jun-2021 but would be reintubated on 06-Jun-2021. Patient had tracheostomy and PEG-tube placed on 07-Jun-2021. Patient required treatment for hospital-acquired pneumonia. Patient was transferred to LTAC on 11-Jun-2021.    1. Acute respiratory failure s/p trach 6/7/2021  2. S/p treatment pseudomonas ESBL and klebsiella pneumonia   3. Encephalopathy   4. Subarachnoid bleed s/p craniotomy and clipping P-comm rupture aneurysm.   5. HTN  6. Malnourishment   7. Dysphagia s/p G tube    Recommendations/Plans:    1. Weaning    On continuous TM/phase 2 with trials of PMV up to 4 hrs BID as tolerated. Will attempt to increase PMV time as tolerated, but has significant secretions in last 24 hours.     Planning for conference with neurology re their prognosis    2. Trach care:     Downsized from #7 Bivona to #6 Bivona on 7/28    3. Dysphagia:     BDT after trach downsize on 7/28 with no immediate aspiration which is improvement. See SLP note from David Coffman from 7/28; much of swallow issue appears to be volitional due to stroke, needs to be cued to swallow secretions    4. Bronchial hygiene:    Continue scopolamine patch for now but watch for secretion inspissation, anticholinergic neurologic side effects; would not increase dose    Continue scheduled ipratopium/albuterol nebs; metaneb, acetylcysteine, and 3% saline nebs BID added on 7/26 and seem to be helping  somewhat      Jacklyn Howard MD  Pulmonary and Critical Care Medicine  [facility]  Office: [phone]

## 2021-08-02 NOTE — PROGRESS NOTES
Neurology called for prognosis evaluation. Would need to set up video evaluation. Waiting to hear from primary physician regarding setting up a conference with patient/family.      Dr. Hernandez

## 2021-08-02 NOTE — PLAN OF CARE
"  Problem: Device-Related Complication Risk (Artificial Airway)  Goal: Optimal Device Function  Outcome: No Change     Problem: Skin and Tissue Injury (Artificial Airway)  Goal: Absence of Device-Related Skin or Tissue Injury  Outcome: No Change   RT PROGRESS NOTE     DATA:     CURRENT SETTINGS:             TRACH TYPE / SIZE:  #6 bivona changed 7/28             MODE:   hftm 30L and 30%                  ACTION:             THERAPIES:   duo neb bid, saline bid, mucomyst bid             SUCTION:                           FREQUENCY:   x7                        AMOUNT:   scant to copious                        CONSISTENCY:   thick tenacious                        COLOR:   white to clear             SPONTANEOUS COUGH EFFORT/STRENGTH OF EFFORT (not elicited by suctioning): moderate strong congested                              WEANING PHASE:   2                         WEAN MODE:    pmv worn for 3h and 35 minutes this morning with no distress.  PMV then placed on again at 17:25. Pt did required suction for copious mucous when cuff deflated.  Order is pmv up to 4 hours bid as tolerated                            RESPONSE:             BS:   coarse and diminished             VITAL SIGNS:   Blood pressure 117/71, pulse 95, temperature 98.4  F (36.9  C), temperature source Oral, resp. rate 24, height 1.803 m (5' 10.98\"), weight 69.9 kg (154 lb), SpO2 95 %.                 EMOTIONAL NEEDS / CONCERNS:  no                RISK FOR SELF DECANNULATION:  yes                        RISK DUE TO:  confusion                        INTERVENTION/S IN PLACE IS/ARE:  Bilateral mitts       NOTE / PLAN:   Continue pulmonary toilet.  RN reports less suction requirements yesterday.  Doctor updated.    "

## 2021-08-03 ENCOUNTER — APPOINTMENT (OUTPATIENT)
Dept: SPEECH THERAPY | Facility: CLINIC | Age: 71
DRG: 207 | End: 2021-08-03
Attending: HOSPITALIST
Payer: COMMERCIAL

## 2021-08-03 ENCOUNTER — APPOINTMENT (OUTPATIENT)
Dept: OCCUPATIONAL THERAPY | Facility: CLINIC | Age: 71
DRG: 207 | End: 2021-08-03
Attending: HOSPITALIST
Payer: COMMERCIAL

## 2021-08-03 ENCOUNTER — APPOINTMENT (OUTPATIENT)
Dept: PHYSICAL THERAPY | Facility: CLINIC | Age: 71
DRG: 207 | End: 2021-08-03
Attending: HOSPITALIST
Payer: COMMERCIAL

## 2021-08-03 PROCEDURE — 97112 NEUROMUSCULAR REEDUCATION: CPT | Mod: GP | Performed by: PHYSICAL THERAPIST

## 2021-08-03 PROCEDURE — 120N000017 HC R&B RESPIRATORY CARE

## 2021-08-03 PROCEDURE — 250N000009 HC RX 250: Performed by: INTERNAL MEDICINE

## 2021-08-03 PROCEDURE — 250N000013 HC RX MED GY IP 250 OP 250 PS 637: Performed by: HOSPITALIST

## 2021-08-03 PROCEDURE — 97530 THERAPEUTIC ACTIVITIES: CPT | Mod: GP | Performed by: PHYSICAL THERAPIST

## 2021-08-03 PROCEDURE — 250N000013 HC RX MED GY IP 250 OP 250 PS 637: Performed by: INTERNAL MEDICINE

## 2021-08-03 PROCEDURE — 94640 AIRWAY INHALATION TREATMENT: CPT

## 2021-08-03 PROCEDURE — 94668 MNPJ CHEST WALL SBSQ: CPT

## 2021-08-03 PROCEDURE — 999N000009 HC STATISTIC AIRWAY CARE

## 2021-08-03 PROCEDURE — 94640 AIRWAY INHALATION TREATMENT: CPT | Mod: 76

## 2021-08-03 PROCEDURE — 92507 TX SP LANG VOICE COMM INDIV: CPT | Mod: GN

## 2021-08-03 PROCEDURE — 250N000011 HC RX IP 250 OP 636

## 2021-08-03 PROCEDURE — 999N000157 HC STATISTIC RCP TIME EA 10 MIN

## 2021-08-03 PROCEDURE — 92526 ORAL FUNCTION THERAPY: CPT | Mod: GN

## 2021-08-03 PROCEDURE — 97530 THERAPEUTIC ACTIVITIES: CPT | Mod: GO | Performed by: OCCUPATIONAL THERAPIST

## 2021-08-03 PROCEDURE — 97535 SELF CARE MNGMENT TRAINING: CPT | Mod: GO | Performed by: OCCUPATIONAL THERAPIST

## 2021-08-03 PROCEDURE — 250N000013 HC RX MED GY IP 250 OP 250 PS 637

## 2021-08-03 PROCEDURE — 94660 CPAP INITIATION&MGMT: CPT

## 2021-08-03 PROCEDURE — 999N000123 HC STATISTIC OXYGEN O2DAILY TECH TIME

## 2021-08-03 RX ADMIN — MIRTAZAPINE 7.5 MG: 7.5 TABLET ORAL at 20:31

## 2021-08-03 RX ADMIN — ACETYLCYSTEINE 2 ML: 200 SOLUTION ORAL; RESPIRATORY (INHALATION) at 07:15

## 2021-08-03 RX ADMIN — ANORECTAL OINTMENT: 15.7; .44; 24; 20.6 OINTMENT TOPICAL at 08:06

## 2021-08-03 RX ADMIN — Medication 3 PACKET: at 08:05

## 2021-08-03 RX ADMIN — IPRATROPIUM BROMIDE AND ALBUTEROL SULFATE 3 ML: 2.5; .5 SOLUTION RESPIRATORY (INHALATION) at 19:13

## 2021-08-03 RX ADMIN — ACETYLCYSTEINE 2 ML: 200 SOLUTION ORAL; RESPIRATORY (INHALATION) at 19:15

## 2021-08-03 RX ADMIN — FAMOTIDINE 20 MG: 20 TABLET, FILM COATED ORAL at 20:34

## 2021-08-03 RX ADMIN — ANORECTAL OINTMENT: 15.7; .44; 24; 20.6 OINTMENT TOPICAL at 13:23

## 2021-08-03 RX ADMIN — LISINOPRIL 40 MG: 20 TABLET ORAL at 20:31

## 2021-08-03 RX ADMIN — HEPARIN SODIUM 5000 UNITS: 5000 INJECTION, SOLUTION INTRAVENOUS; SUBCUTANEOUS at 13:23

## 2021-08-03 RX ADMIN — Medication 3 PACKET: at 20:47

## 2021-08-03 RX ADMIN — METHYLPHENIDATE HYDROCHLORIDE 10 MG: 10 TABLET ORAL at 05:51

## 2021-08-03 RX ADMIN — FAMOTIDINE 20 MG: 20 TABLET, FILM COATED ORAL at 08:06

## 2021-08-03 RX ADMIN — IPRATROPIUM BROMIDE AND ALBUTEROL SULFATE 3 ML: 2.5; .5 SOLUTION RESPIRATORY (INHALATION) at 07:16

## 2021-08-03 RX ADMIN — HEPARIN SODIUM 5000 UNITS: 5000 INJECTION, SOLUTION INTRAVENOUS; SUBCUTANEOUS at 05:51

## 2021-08-03 RX ADMIN — ANORECTAL OINTMENT: 15.7; .44; 24; 20.6 OINTMENT TOPICAL at 20:32

## 2021-08-03 RX ADMIN — VENLAFAXINE 37.5 MG: 37.5 TABLET ORAL at 12:11

## 2021-08-03 RX ADMIN — SODIUM CHLORIDE 30 MG/ML INHALATION SOLUTION 3 ML: 30 SOLUTION INHALANT at 07:41

## 2021-08-03 RX ADMIN — VENLAFAXINE 37.5 MG: 37.5 TABLET ORAL at 17:06

## 2021-08-03 RX ADMIN — VENLAFAXINE 37.5 MG: 37.5 TABLET ORAL at 08:05

## 2021-08-03 RX ADMIN — Medication 3 PACKET: at 13:23

## 2021-08-03 RX ADMIN — HEPARIN SODIUM 5000 UNITS: 5000 INJECTION, SOLUTION INTRAVENOUS; SUBCUTANEOUS at 21:02

## 2021-08-03 RX ADMIN — SODIUM CHLORIDE 30 MG/ML INHALATION SOLUTION 3 ML: 30 SOLUTION INHALANT at 19:31

## 2021-08-03 ASSESSMENT — MIFFLIN-ST. JEOR: SCORE: 1511.73

## 2021-08-03 NOTE — PLAN OF CARE
Plan of Care by Jacqueline Vieyra RN at 2021  9:23 AM     Author: Jacqueline Vieyra RN Service: -- Author Type: RN, Care Manager    Filed: 2021  9:33 AM Date of Service: 2021  9:23 AM Status: Signed    : Jacqueline Vieyra RN (RN, Care Manager)       Care Management Progression of Care Update        DR GLOS - Target D/C Date TBD        PLAN/GOALS  1. Pulmonary following. Phase 2 wean~continous heated trach mask with hi rosemarie 30% Fi02 tolerated PMV for 4 hours twice a day.  Duo-neb, mucomyst and MET Neb twice a day for pulmonary hygiene.  Frequent suctioning 6x/shift.    2.  Nutrition management.  Tube feeding via PEG placed 21.  Registered dietician to monitor tube feeding tolerance, weight and labs.    3.  Neurology following intermittently.    4. PT/OT 5x/week.     5.  Speech therapy 5x/week. Continue effortful swallow exercises and voicing.    6.  Palliative following.           BARRIERS  1.  Acute hypoxic respiratory failure due to subarachnoid hemorrhage. Trach and oxygen wean.    2.  Traumatic brain injury/intracranial hemorrhage/acute ischemic stroke.    3.  Oropharyngeal dysphagia.    4.   Bilateral mitts for pulling at tubes and lines.    5.  Frequent suctioning of copious secretions.        Disposition:  TCU  Care Manager Name:  Jacqueline Vieyra RN,BSN, HNB-BC    Date/Time:  21 @ 8:02 AM

## 2021-08-03 NOTE — PROGRESS NOTES
[facility]    Medicine Progress Note - Hospitalist Service       Date of Admission:  6/11/2021    Identifier:  70-year-old retired man who lived with his wife at home prehospitalization, who originally presented to [facility] on May 15, 2021 for altered mental state he had sustained a fall and was found to be unresponsive by his wife.  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  He was treated with milrinone and verapamil.  He was successfully extubated on Pamela 3 and on June 6 he was reintubated due to drop in his saturations and underwent tracheostomy placement as well as feeding tube placement on June 7.  June 9: EVD was removed.  He was transferred to LTAC on June 11  In the last 1 month he has not been able to be liberated from the ventilator.  He has had repeated bouts of Klebsiella infection in sputum and ventilator associated pneumonia.  Additionally he has had a large amount of tracheal secretions, significant cognitive impairment, left-sided neglect and ongoing episodes of agitation.  Patient's family has been monitoring the patient continuously during the daytime hours via video monitor and visit with him in person over the weekends.  Multiple specialties have been involved in the care of this patient including palliative care, infectious disease pulmonary medicine and neurology amongst others.      Assessment & Plan           1/.  Acute hypoxemic respiratory failure due to prolonged hospitalization, also because of significant intracranial hemorrhage.  One of the biggest barriers to decannulation is related to the issue of excessive  secretions and these need to be addressed appropriately.  glycopyrolate was d/thang 2 weeks back.per recs of pulmonary meds  He continues to be on a scopolamine patch  Speaking valve is being attempted in short trials,  2/.  Insomnia: Definitely making matters worse for the patient as I review his medications he is on Ritalin twice a day, getting Ritalin at bedtime is probably not the best idea for treatment of insomnia.  We will discontinue Ritalin at that time we will keep him at only once a day in the morning.  Started with nighttime mirtazapine 7.5 mg scheduled.  Which did seem to help  Can be uptitrated as appropriate.  However at this time I do not see the need to uptitrate it  3/.  Traumatic brain injury/intracranial hemorrhage: Effexor was started about 2 weeks ago at 25 mg 3 times a day we can uptitrated today to 37.5 mg 3 times a day, family informed  4/.  Dysphagia: Secondary to respiratory failure continue with tube feeds at this time.  Even though the patient passed a blue dye test about a week ago but he has a lot of secretions  5/.  Ventilator associated pneumonia appreciate intervention and recommendations by infectious disease antibiotics to continued till 7/18. Off all Abx now  6/.  Prognosis and logistics: This is challenge because patient's wife is definitely having a difficult time with the situation and recognizing that the patient is unlikely going to be his prehospitalization self.  She probably does know it but is unwilling to accept the reality which is unfortunate.    7/.  Restraints: We have been using mittens and that seems to be eliminating the need for a sitter.       Diet: Adult Formula Drip Feeding: Continuous Vital 1.5; Gastrostomy; Goal Rate: 90; mL/hr; Medication - Feeding Tube Flush Frequency: At least 15-30 mL water before and after medication administration and with tube clogging; Amount to Send (Nutrition us...    DVT Prophylaxis: Heparin SQ  Gutierrez Catheter: Not present  Central  Lines: None  Code Status: Full Code      Disposition Plan   Expected discharge: Unclear at this time recommended to LTC once decannulated.     The patient's care was discussed with the Bedside Nurse, Care Coordinator/ and Patient's Family.  I called patient's wife Susy and spoke with her directly    Latonya Lemus MD, A  Hospitalist Service  [facility]  Securely message with the Vocera Web Console (learn more here)  Text page via [employer] Paging/Directory        ______________________________________________________________________    Interval History   Continues to have inconsistent responses to the same questions no new or acute issues overnight    Data reviewed today: I reviewed all medications, new labs and imaging results over the last 24 hours. I personally reviewed no images or EKG's today.    Physical Exam   Vital Signs: Temp: 98.2  F (36.8  C) Temp src: Oral BP: 107/68 Pulse: 90   Resp: 20 SpO2: 97 % O2 Device: Trach dome Oxygen Delivery: 10 LPM  Weight: 158 lbs 11.2 oz  General Appearance: Alert awake oriented x1  Respiratory: Coarse breath sounds without any crackles or wheezes  Cardiovascular: S1-S2 regular rate and rhythm  GI: Nontender nondistended feeding tube noted bowel sounds are present  Skin: No rash or lesions  Other: Patient is moving his arms without difficulty.  He does not really follow any commands with consistency  Tracheostomy is noted    Data   Recent Labs   Lab 08/02/21  0738 07/30/21  0912 07/30/21  0628 07/28/21  0601   WBC 8.0 6.7  --  10.2   HGB 10.7* 10.2*  --  10.3*   MCV 95 96  --  99    373  --  428     --  140 139   POTASSIUM 4.6  --  4.5 4.2   CHLORIDE 98  --  98 97*   CO2 32*  --  32* 31   BUN 34*  --  31* 29*   CR 0.81  --  0.78 0.74   ANIONGAP 9  --  10 11   RAGINI 11.0*  --  10.7* 10.9*     --  122 148*

## 2021-08-03 NOTE — PLAN OF CARE
"  Problem: Device-Related Complication Risk (Artificial Airway)  Goal: Optimal Device Function  Outcome: No Change     Problem: Skin and Tissue Injury (Artificial Airway)  Goal: Absence of Device-Related Skin or Tissue Injury  Outcome: No Change   RT PROGRESS NOTE     DATA:     CURRENT SETTINGS:             TRACH TYPE / SIZE:  #6 bivona placed 7/28             MODE: 10L and 30% tm with cuff inflated                 ACTION:             THERAPIES:   duo neb bid, saline bid, mucomyst bid             SUCTION:                           FREQUENCY:   x8                        AMOUNT:   small to copious                        CONSISTENCY:   thick to thin                        COLOR:   white to pale yellow             SPONTANEOUS COUGH EFFORT/STRENGTH OF EFFORT (not elicited by suctioning): moderate strong, still requiring suction                              WEANING PHASE:   2                        WEAN MODE:    Pmv for 3 hours and 15 minutes before pt having emesis and wean ended, cuff inflated.  No further weaning this shift.                                                   RESPONSE:             BS:   coarse and diminished             VITAL SIGNS:   Blood pressure 99/64, pulse 88, temperature 98.1  F (36.7  C), temperature source Oral, resp. rate 22, height 1.803 m (5' 10.98\"), weight 72 kg (158 lb 11.2 oz), SpO2 98 %.                 EMOTIONAL NEEDS / CONCERNS:  no                RISK FOR SELF DECANNULATION:  yes                        RISK DUE TO:  confusion                        INTERVENTION/S IN PLACE IS/ARE:  bilateral mitts       NOTE / PLAN:   Continue pulmonary toilet.    "

## 2021-08-03 NOTE — PLAN OF CARE
Problem: Communication Impairment (Artificial Airway)  Goal: Effective Communication  Outcome: Improving     Problem: Device-Related Complication Risk (Artificial Airway)  Goal: Optimal Device Function  Outcome: Improving     Problem: Skin and Tissue Injury (Artificial Airway)  Goal: Absence of Device-Related Skin or Tissue Injury  Outcome: Improving     RT PROGRESS NOTE     DATA:     CURRENT SETTINGS:             TRACH TYPE / SIZE:  #6 Bivona placed on 7/28/21             MODE:   HFTM (Cuff up)             FIO2:   30% and 30 lpm     ACTION:             THERAPIES:   DUO NEB BID/NaCl BID/MUCOMYST BID/METNEB BID             SUCTION:                           FREQUENCY:  X6                        AMOUNT:  copious to large                        CONSISTENCY:   Thick                        COLOR:   White/yellow             SPONTANEOUS COUGH EFFORT/STRENGTH OF EFFORT (not elicited by suctioning): Yes:Strong                              WEANING PHASE: #2                        WEAN MODE:   HFTM as reji                        WEAN TIME:                           END WEAN REASON:  Cont     RESPONSE:             BS:   Coarse             VITAL SIGNS: SAT 93-96%, HR 84-93 , RR 20-22             EMOTIONAL NEEDS / CONCERNS:  Confuse               RISK FOR SELF DECANNULATION:  Yes                        RISK DUE TO:  Restless                        INTERVENTION/S IN PLACE IS/ARE: Bilateral mitts       NOTE / PLAN:  Pt is on hftm 30% and 30 lpm, reji well. Cont weaning on PMV 4 hrs twice a day as reji and keep sat >/=90%

## 2021-08-03 NOTE — PROGRESS NOTES
Oximeter alarming.  PT and OT at bedside.  Pt was sitting up in chair and coughed, sneezed, he spoke a few words, then threw up.  Pt suctioned for copious yellow pale mucous, cuff in inflated and then patient suctioned again for copious yellow pale mucous.  Pt now back in bed with no distress noted.  BRS:  Diminished and coarse.  Hr 94, RR 26, 97% on 10L and 30%.

## 2021-08-03 NOTE — PLAN OF CARE
Problem: Adult Inpatient Plan of Care  Goal: Absence of Hospital-Acquired Illness or Injury  Intervention: Prevent Infection  Recent Flowsheet Documentation  Taken 8/2/2021 4406 by Amanda Jean RN  Infection Prevention: hand hygiene promoted   Patient was cooperative with cares and tolerated repositions well. Contact and enteric precautions maintained. Patient denied pain.

## 2021-08-03 NOTE — PLAN OF CARE
Problem: Restraint for Non-Violent/Non-Self-Destructive Behavior  Goal: Prevent/Manage Potential Problems  Description: Maintain safety of patient and others during period of restraint.  Promote psychological and physical wellbeing.  Prevent injury to skin and involved body parts.  Promote nutrition, hydration, and elimination.  Outcome: No Change   Pt continues to grab tubes when mitts are off during cares. Will continue to have restraints for his safety. Will continue to monitor.  Problem: Anxiety  Goal: Anxiety Reduction or Resolution  Outcome: Improving   Pt alert and  able to nod  / shake head for yes or no questions. Pt was calm all shift. No signs of anxiety noted. Pt slept majority of the shift.

## 2021-08-03 NOTE — PLAN OF CARE
Patient still has bilateral mitts on due to tube pulling.Had small emesis this shift,cleaned up and made comfortable. Will continue to monitor.

## 2021-08-04 ENCOUNTER — APPOINTMENT (OUTPATIENT)
Dept: PHYSICAL THERAPY | Facility: CLINIC | Age: 71
DRG: 207 | End: 2021-08-04
Attending: HOSPITALIST
Payer: COMMERCIAL

## 2021-08-04 ENCOUNTER — APPOINTMENT (OUTPATIENT)
Dept: OCCUPATIONAL THERAPY | Facility: CLINIC | Age: 71
DRG: 207 | End: 2021-08-04
Attending: HOSPITALIST
Payer: COMMERCIAL

## 2021-08-04 ENCOUNTER — APPOINTMENT (OUTPATIENT)
Dept: SPEECH THERAPY | Facility: CLINIC | Age: 71
DRG: 207 | End: 2021-08-04
Attending: HOSPITALIST
Payer: COMMERCIAL

## 2021-08-04 LAB
ANION GAP SERPL CALCULATED.3IONS-SCNC: 8 MMOL/L (ref 5–18)
BASOPHILS # BLD AUTO: 0 10E3/UL (ref 0–0.2)
BASOPHILS NFR BLD AUTO: 0 %
BUN SERPL-MCNC: 34 MG/DL (ref 8–22)
CALCIUM SERPL-MCNC: 10.9 MG/DL (ref 8.5–10.5)
CHLORIDE BLD-SCNC: 101 MMOL/L (ref 98–107)
CO2 SERPL-SCNC: 32 MMOL/L (ref 22–31)
CREAT SERPL-MCNC: 0.82 MG/DL (ref 0.7–1.3)
EOSINOPHIL # BLD AUTO: 0.1 10E3/UL (ref 0–0.7)
EOSINOPHIL NFR BLD AUTO: 2 %
ERYTHROCYTE [DISTWIDTH] IN BLOOD BY AUTOMATED COUNT: 14.1 % (ref 10–15)
GFR SERPL CREATININE-BSD FRML MDRD: >90 ML/MIN/1.73M2
GLUCOSE BLD-MCNC: 114 MG/DL (ref 70–125)
HCT VFR BLD AUTO: 31.5 % (ref 40–53)
HGB BLD-MCNC: 10.4 G/DL (ref 13.3–17.7)
IMM GRANULOCYTES # BLD: 0 10E3/UL
IMM GRANULOCYTES NFR BLD: 0 %
LYMPHOCYTES # BLD AUTO: 1.3 10E3/UL (ref 0.8–5.3)
LYMPHOCYTES NFR BLD AUTO: 14 %
MCH RBC QN AUTO: 31.9 PG (ref 26.5–33)
MCHC RBC AUTO-ENTMCNC: 33 G/DL (ref 31.5–36.5)
MCV RBC AUTO: 97 FL (ref 78–100)
MONOCYTES # BLD AUTO: 0.8 10E3/UL (ref 0–1.3)
MONOCYTES NFR BLD AUTO: 9 %
NEUTROPHILS # BLD AUTO: 7 10E3/UL (ref 1.6–8.3)
NEUTROPHILS NFR BLD AUTO: 75 %
NRBC # BLD AUTO: 0 10E3/UL
NRBC BLD AUTO-RTO: 0 /100
PLATELET # BLD AUTO: 321 10E3/UL (ref 150–450)
POTASSIUM BLD-SCNC: 4.2 MMOL/L (ref 3.5–5)
RBC # BLD AUTO: 3.26 10E6/UL (ref 4.4–5.9)
SODIUM SERPL-SCNC: 141 MMOL/L (ref 136–145)
WBC # BLD AUTO: 9.3 10E3/UL (ref 4–11)

## 2021-08-04 PROCEDURE — 97530 THERAPEUTIC ACTIVITIES: CPT | Mod: GP | Performed by: PHYSICAL THERAPIST

## 2021-08-04 PROCEDURE — 999N000078 HC STATISTIC INTRAPULMONARY PERCUSSIVE VENT

## 2021-08-04 PROCEDURE — 250N000011 HC RX IP 250 OP 636

## 2021-08-04 PROCEDURE — 94640 AIRWAY INHALATION TREATMENT: CPT | Mod: 76

## 2021-08-04 PROCEDURE — 99207 PR CDG-CODE CATEGORY CHANGED: CPT | Performed by: INTERNAL MEDICINE

## 2021-08-04 PROCEDURE — 250N000013 HC RX MED GY IP 250 OP 250 PS 637

## 2021-08-04 PROCEDURE — 94668 MNPJ CHEST WALL SBSQ: CPT

## 2021-08-04 PROCEDURE — 99232 SBSQ HOSP IP/OBS MODERATE 35: CPT | Performed by: NURSE PRACTITIONER

## 2021-08-04 PROCEDURE — 94003 VENT MGMT INPAT SUBQ DAY: CPT | Performed by: INTERNAL MEDICINE

## 2021-08-04 PROCEDURE — 250N000013 HC RX MED GY IP 250 OP 250 PS 637: Performed by: INTERNAL MEDICINE

## 2021-08-04 PROCEDURE — 999N000123 HC STATISTIC OXYGEN O2DAILY TECH TIME

## 2021-08-04 PROCEDURE — 999N000009 HC STATISTIC AIRWAY CARE

## 2021-08-04 PROCEDURE — 120N000017 HC R&B RESPIRATORY CARE

## 2021-08-04 PROCEDURE — 250N000013 HC RX MED GY IP 250 OP 250 PS 637: Performed by: HOSPITALIST

## 2021-08-04 PROCEDURE — 97110 THERAPEUTIC EXERCISES: CPT | Mod: GP | Performed by: PHYSICAL THERAPIST

## 2021-08-04 PROCEDURE — 250N000009 HC RX 250: Performed by: INTERNAL MEDICINE

## 2021-08-04 PROCEDURE — 97530 THERAPEUTIC ACTIVITIES: CPT | Mod: GO | Performed by: OCCUPATIONAL THERAPIST

## 2021-08-04 PROCEDURE — 85025 COMPLETE CBC W/AUTO DIFF WBC: CPT | Performed by: INTERNAL MEDICINE

## 2021-08-04 PROCEDURE — 94640 AIRWAY INHALATION TREATMENT: CPT

## 2021-08-04 PROCEDURE — 94660 CPAP INITIATION&MGMT: CPT

## 2021-08-04 PROCEDURE — 36415 COLL VENOUS BLD VENIPUNCTURE: CPT | Performed by: HOSPITALIST

## 2021-08-04 PROCEDURE — 999N000157 HC STATISTIC RCP TIME EA 10 MIN

## 2021-08-04 PROCEDURE — 82310 ASSAY OF CALCIUM: CPT | Performed by: HOSPITALIST

## 2021-08-04 RX ADMIN — ANORECTAL OINTMENT: 15.7; .44; 24; 20.6 OINTMENT TOPICAL at 08:08

## 2021-08-04 RX ADMIN — Medication 3 PACKET: at 08:08

## 2021-08-04 RX ADMIN — FAMOTIDINE 20 MG: 20 TABLET, FILM COATED ORAL at 08:07

## 2021-08-04 RX ADMIN — ACETYLCYSTEINE 2 ML: 200 SOLUTION ORAL; RESPIRATORY (INHALATION) at 07:23

## 2021-08-04 RX ADMIN — ANORECTAL OINTMENT: 15.7; .44; 24; 20.6 OINTMENT TOPICAL at 13:23

## 2021-08-04 RX ADMIN — VENLAFAXINE 37.5 MG: 37.5 TABLET ORAL at 08:07

## 2021-08-04 RX ADMIN — Medication 3 PACKET: at 13:23

## 2021-08-04 RX ADMIN — HEPARIN SODIUM 5000 UNITS: 5000 INJECTION, SOLUTION INTRAVENOUS; SUBCUTANEOUS at 21:26

## 2021-08-04 RX ADMIN — FAMOTIDINE 20 MG: 20 TABLET, FILM COATED ORAL at 21:26

## 2021-08-04 RX ADMIN — IPRATROPIUM BROMIDE AND ALBUTEROL SULFATE 3 ML: 2.5; .5 SOLUTION RESPIRATORY (INHALATION) at 19:25

## 2021-08-04 RX ADMIN — VENLAFAXINE 37.5 MG: 37.5 TABLET ORAL at 17:18

## 2021-08-04 RX ADMIN — SODIUM CHLORIDE 30 MG/ML INHALATION SOLUTION 3 ML: 30 SOLUTION INHALANT at 19:26

## 2021-08-04 RX ADMIN — METHYLPHENIDATE HYDROCHLORIDE 10 MG: 10 TABLET ORAL at 05:35

## 2021-08-04 RX ADMIN — ANORECTAL OINTMENT: 15.7; .44; 24; 20.6 OINTMENT TOPICAL at 21:27

## 2021-08-04 RX ADMIN — HEPARIN SODIUM 5000 UNITS: 5000 INJECTION, SOLUTION INTRAVENOUS; SUBCUTANEOUS at 13:23

## 2021-08-04 RX ADMIN — HEPARIN SODIUM 5000 UNITS: 5000 INJECTION, SOLUTION INTRAVENOUS; SUBCUTANEOUS at 05:34

## 2021-08-04 RX ADMIN — ACETYLCYSTEINE 2 ML: 200 SOLUTION ORAL; RESPIRATORY (INHALATION) at 19:26

## 2021-08-04 RX ADMIN — MIRTAZAPINE 7.5 MG: 7.5 TABLET ORAL at 21:26

## 2021-08-04 RX ADMIN — Medication 3 PACKET: at 21:29

## 2021-08-04 RX ADMIN — LISINOPRIL 40 MG: 20 TABLET ORAL at 18:42

## 2021-08-04 RX ADMIN — SODIUM CHLORIDE 30 MG/ML INHALATION SOLUTION 3 ML: 30 SOLUTION INHALANT at 07:33

## 2021-08-04 RX ADMIN — VENLAFAXINE 37.5 MG: 37.5 TABLET ORAL at 13:23

## 2021-08-04 RX ADMIN — IPRATROPIUM BROMIDE AND ALBUTEROL SULFATE 3 ML: 2.5; .5 SOLUTION RESPIRATORY (INHALATION) at 07:23

## 2021-08-04 RX ADMIN — SCOPALAMINE 1 PATCH: 1 PATCH, EXTENDED RELEASE TRANSDERMAL at 13:23

## 2021-08-04 NOTE — PROGRESS NOTES
[facility]    Medicine Progress Note - Hospitalist Service      Date of Admission:  6/11/2021    Identifier:  70-year-old retired man who lived with his wife at home prehospitalization, who originally presented to [facility] on May 15, 2021 for altered mental state he had sustained a fall and was found to be unresponsive by his wife.  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  He was treated with milrinone and verapamil.  He was successfully extubated on Pamela 3 and on June 6 he was reintubated due to drop in his saturations and underwent tracheostomy placement as well as feeding tube placement on June 7.  June 9: EVD was removed.  He was transferred to LTAC on June 11  In the last 1 month he has not been able to be liberated from the ventilator.  He has had repeated bouts of Klebsiella infection in sputum and ventilator associated pneumonia.  Additionally he has had a large amount of tracheal secretions, significant cognitive impairment, left-sided neglect and ongoing episodes of agitation.  Patient's family has been monitoring the patient continuously during the daytime hours via video monitor and visit with him in person over the weekends.  Multiple specialties have been involved in the care of this patient including palliative care, infectious disease pulmonary medicine and neurology    Assessment & Plan         1/.  Acute hypoxemic respiratory failure secondary to prolonged hospitalization, significant intracranial hemorrhage.  - excessive thick white secretions is the main barrier to decannulation   - glycopyrolate was d/thang 2 weeks back per  pulmonary medicine  -Continues to be on a scopolamine patch  - [facility] RT stated patient needed to be suctioned three times overnight which is an improvement.   - Consider sputum culture if increased or yellow secretions   - Speaking valve is being attempted in short trials    2/.  Insomnia: Ritalin twice a day, Ritalin at bedtime was probably making insomnia worsen. Bedtime Ritalin stopped 8/3/21 and continues dose of once daily in the morning.  - Started with nighttime mirtazapine 7.5 mg scheduled. Can be uptitrated as appropriate.     3/.  Traumatic brain injury/intracranial hemorrhage: Effexor was started about 2 weeks ago at 25 mg 3 times a day, uptitrated today to 37.5 mg 3 times a day, family informed    4/.  Dysphagia: Secondary to respiratory failure continue with tube feeds at this time.  Even though the patient passed a blue dye test about a week ago but he still has a lot of secretions    5/.  Ventilator associated pneumonia appreciate intervention and recommendations by infectious disease antibiotics to continued till 7/18. Off all Abx now. Repeat sputum culture if yellow or increased secretions.     6/.  Prognosis and logistics: This is challenge because patient's wife is definitely having a difficult time with the situation and recognizing that the patient is unlikely going to be his prehospitalization self.     7/.  Restraints: We have been using mittens and that seems to be eliminating the need for a sitter. Order renewed today       Diet: Adult Formula Drip Feeding: Continuous Vital 1.5; Gastrostomy; Goal Rate: 90; mL/hr; Medication - Feeding Tube Flush Frequency: At least 15-30 mL water before and after medication administration and with tube clogging; Amount to Send (Nutrition us...    DVT Prophylaxis: Heparin SQ  Gutierrez Catheter: Not present  Central Lines: None  Code Status: Full Code      Disposition Plan   Expected discharge: Unclear at this time. Plan for SNF once decannulated      Total  "floor/unit time spent was 25 minutes in reviewing the record, medications, lab results and completing documentation. 15 minutes spent in counseling and discussion with patient regarding diagnosis, medications and treatment plan. Care discussed and coordinated with patient and nurse.     SISI Moore [facility]  Hospitalist Service  [facility]  Securely message with the Vocera Web Console (learn more here)  Text page via [facility] Paging/Directory             ______________________________________________________________________    Interval History   Per nursing no current concerns. Continues to be fidgety at times and is wearing bilateral hand mitts for safety. No concerns regarding patient's sleep overnight. Respiratory therapy stating that secretions continue to be thick, but not yellow, and patient required less suctioning overnight (suctioned 3 times).   Patient is lying in bed, appears comfortable with no signs of distress. When I introduce myself he mouths \"hello.\" When I ask him if he is having pain he mouths, \"no.\" Per nursing no fevers, labored breathing, vomiting or loose stools.    Review of Systems: 10 point review of systems negative except for pertinent positives mentioned in HPI    Data reviewed today: I reviewed all medications, new labs and imaging results over the last 24 hours. I personally reviewed no images or EKG's today.    Physical Exam   Vital Signs: Temp: 97.6  F (36.4  C) Temp src: Oral BP: 118/74 Pulse: 86   Resp: 20 SpO2: 94 % O2 Device: Trach dome Oxygen Delivery: 10 LPM  Weight: 158 lbs 11.2 oz  General Appearance: Alert, slightly fidgety rasing arms up with hand mitts x 2 on, no apparent distress   Respiratory: coarse breath sounds, no wheezing or crackles, tracheostomy patent with no visible secretions, surrounding skin around trach dry/intact  Cardiovascular: S1,S2, rhythm rate regular, negative murmur   GI: Bowel sounds positive x 4, non distended non tender, G/J tube " appears patent with tube feeding infusion, surrounding skin dry/intact  Skin: pink, warm, dry and intact, no rash or open sores   Neurological: alert and oriented x 1, nonverbal due to trach, moves upper extremities, inconsistent following commands     Data   Medications     dextrose       sodium chloride         acetylcysteine  2 mL Nebulization BID     famotidine  20 mg Oral or Feeding Tube BID     fiber modular (NUTRISOURCE FIBER)  3 packet Per Feeding Tube TID     heparin ANTICOAGULANT  5,000 Units Subcutaneous Q8H     ipratropium - albuterol 0.5 mg/2.5 mg/3 mL  3 mL Nebulization BID     lisinopril  40 mg Oral or Feeding Tube Daily     menthol-zinc oxide   Topical TID     methylphenidate  10 mg Oral or Feeding Tube QAM AC     mirtazapine  7.5 mg Per Feeding Tube At Bedtime     scopolamine  1 patch Transdermal Q72H    And     scopolamine   Transdermal Q8H     sodium chloride  3 mL Nebulization BID     venlafaxine  37.5 mg Per Feeding Tube TID w/meals

## 2021-08-04 NOTE — PLAN OF CARE
Problem: Restraint for Non-Violent/Non-Self-Destructive Behavior  Goal: Prevent/Manage Potential Problems  Description: Maintain safety of patient and others during period of restraint.  Promote psychological and physical wellbeing.  Prevent injury to skin and involved body parts.  Promote nutrition, hydration, and elimination.  Outcome: Improving   Pt alert some expressive aphasia coop with cares, ipad on at all times at bedside, cont to assess for restraint reduction cont sally mitts at this time

## 2021-08-04 NOTE — PLAN OF CARE
Problem: Device-Related Complication Risk (Artificial Airway)  Goal: Optimal Device Function  Outcome: Improving     Problem: Skin and Tissue Injury (Artificial Airway)  Goal: Absence of Device-Related Skin or Tissue Injury  Outcome: Improving  RT PROGRESS NOTE     DATA:     CURRENT SETTINGS:             TRACH TYPE / SIZE: #6 Bivona (Placed 07/28)             MODE:  TM             FIO2:   10 L, 30%     ACTION:             THERAPIES: Duo-neb BID/Saline BID/Saline             SUCTION: Yes                          FREQUENCY: 3x                        AMOUNT:   Small x1 to Moderate x2                        CONSISTENCY: Thick                        COLOR:   Pale yellow             SPONTANEOUS COUGH EFFORT/STRENGTH OF EFFORT (not elicited by suctioning): Strong productive cough.                              WEANING PHASE:   2                        WEAN MODE: TM                        WEAN TIME:  Continuously as reji.                        END WEAN REASON:        RESPONSE:             BS:   Coarse             VITAL SIGNS: Sat %, HR , RR 20-24             EMOTIONAL NEEDS / CONCERNS:  None                RISK FOR SELF DECANNULATION:  Yes                        RISK DUE TO:  Confusion                        INTERVENTION/S IN PLACE IS/ARE:  Mitts x2       NOTE / PLAN:      Pt remains on TM and tolerating well with no distress.  Continue current care plan.

## 2021-08-04 NOTE — PROGRESS NOTES
Patient chart reviewed.  Patient discussed in morning rounds.  Barriers to discharge: mitts x2.  Pulmonary following. Phase 2 wean~continous heated trach mask with hi rosemarie 30% Fi02 tolerated PMV for 4 hours twice a day.  Duo-neb, mucomyst and MET Neb twice a day for pulmonary hygiene. Frequent suctioning     Patient Phase 2.  TM 30%/10L.  Patient on tube feeding.  Tube feeding via PEG placed 6/8/21.   Patient and family being followed by/support by Palliative care team.  Patient and family have had 2 Care Progression meetings during LTACH stay.    Patient's exact discharge date, time, disposition TBD.  SW will continue to follow for psychosocial and emotional support of patient and family. SW to facilitate discharge to SNF/TCU when pt no longer requires LTACH level of care.  Family has been informed by MD, therapy, and SW that patient will require placement at discharge: SNF vs TCU.      Dayron Chen, [facility]  [facility] [place]  [phone]

## 2021-08-04 NOTE — PROGRESS NOTES
"Pulmonary Medicine Follow up Note    Clinical status discussed today with respiratory therapist.    Chief complaint: Ongoing respiratory failure    Significant change in clinical status during past 24 hours?   More secretions today     FiO2 (%): 30% 30 L/min HFTM    Tracheal secretions: x6, large     Current phase of ventilator weaning pathway: Phase 2    Ventilator weaning results from yesterday:   24 hours trach mask with HFTM 30 L/min 30%, tolerating PMV for 4 hrs BID    Physical exam   /74 (BP Location: Right arm)   Pulse 92   Temp 98.6  F (37  C)   Resp 20   Ht 1.803 m (5' 10.98\")   Wt 72 kg (158 lb 11.2 oz)   SpO2 92%   BMI 22.14 kg/m      Gen: eyes open, appears chronically ill  HEENT: pale conjunctiva, moist mucosa  Neck: s/p trach, changed to 6 bivona  Lungs: CTA anterolaterally  CV: regular, no murmurs or gallops appreciated  Abdomen: soft, NT, BS wnl  Ext: no edema  Neuro: eyes open, trying to vocalize, generalized weakness    IMAGING:  XR CHEST PORT 1 VIEW  LOCATION: [facility]  DATE/TIME: 7/15/2021 7:50 AM  INDICATION: New VAP followup.  COMPARISON: 07/09/2021.  IMPRESSION:   No change in tracheostomy tube. Heart and mediastinal size are stable. There is indistinctness of the pulmonary interstitium, representing either airway inflammation or edema. There is bandlike opacity involving the retrocardiac region of the left lung base, increased from prior study and representing either atelectasis or infectious process. No pleural effusion or pneumothorax. There is some mild elevation of the right hemidiaphragm.    Assessment:  Patient is a 69 yo man with history of hypertension. Patient initially presented on 15-May-2021 to [facility] after being found unresponsive by his wife. Patient was then found to have a subarachnoid hemorrhage secondary to a ruptured aneurysm. Patient was intubated and was started on mannitol, IV antihypertensives and hyperventilation and patient was " then transferred to [facility]. Patient underwent aneurysm clipping as well as placement of extra-ventricular drain from hydrocephalus on 15-May-2021. Extra-ventricular drain would malfunction and would require replacement on 04-Jun-2021 and 06-Jun-2021. Extra-ventricular drain reportedly was removed on 09-Jun-2021. Patient had intra-arterial vasospasm treatment with verapamil on 28-May-2021.  Patient was extubated on 16-May-2021 but had to be reintubated on 19-May-2021. Patient would be extubated on 03-Jun-2021 but would be reintubated on 06-Jun-2021. Patient had tracheostomy and PEG-tube placed on 07-Jun-2021. Patient required treatment for hospital-acquired pneumonia. Patient was transferred to LTAC on 11-Jun-2021.    1. Acute respiratory failure s/p trach 6/7/2021  2. S/p treatment pseudomonas ESBL and klebsiella pneumonia   3. Encephalopathy   4. Subarachnoid bleed s/p craniotomy and clipping P-comm rupture aneurysm.   5. HTN  6. Malnourishment   7. Dysphagia s/p G tube    Recommendations/Plans:    1. Weaning    On continuous Has been on TM/phase 2 with trials of PMV up to 4 hrs BID as tolerated. Will try PMV throughout the day as tolerated - off at night. He has been doing fine on thus far even with the secretions.     Planning for conference with neurology re their prognosis, which per charting cannot be predicted at this time.    At this point, the best option would be to leave the trach in place for now and re-assess ability to decannulate down the road given that his neurologic status and secretions may have some slow improvement. At this point in time he is not ready to decannulate.     2. Trach care:     Downsized from #7 Bivona to #6 Bivona on 7/28    3. Dysphagia:     BDT after trach downsize on 7/28 with no immediate aspiration which is improvement. See SLP note from David Coffman from 7/28; much of swallow issue appears to be volitional due to stroke, needs to be cued  to swallow secretions    4. Bronchial hygiene:    Continue scopolamine patch for now but watch for secretion inspissation, anticholinergic neurologic side effects; would not increase dose    Continue scheduled ipratopium/albuterol nebs; metaneb, acetylcysteine, and 3% saline nebs BID added on 7/26 and seem to be helping somewhat      Jacklyn Howard MD  Pulmonary and Critical Care Medicine  [facility]  Office: [phone]

## 2021-08-04 NOTE — PLAN OF CARE
Problem: Restraint for Non-Violent/Non-Self-Destructive Behavior  Goal: Prevent/Manage Potential Problems  Description: Maintain safety of patient and others during period of restraint.  Promote psychological and physical wellbeing.  Prevent injury to skin and involved body parts.  Promote nutrition, hydration, and elimination.  Outcome: No Change    Patient continue on restraint for impulsive behavior, No sign of injury noted, patient slept most of the shift. All cares done, no concern.

## 2021-08-04 NOTE — PLAN OF CARE
PROGRESS NOTE     DATA:     CURRENT SETTINGS:               TRACH TYPE / SIZE: #7 Bivona, placed 7/5/21             MODE:  HF 20L TM/ PMV   FIO2:  30%     ACTION:             THERAPIES: Duo/ Mucomyst via Metaneb BID and Sodium Chloride neb BID given on scheduled time             SUCTION:                           FREQUENCY: 6                        AMOUNT: small/ mod x 3/ large x 2                        CONSISTENCY:  thick with lavage x 2                        COLOR:   white              SPONTANEOUS COUGH EFFORT/STRENGTH OF EFFORT (not elicited by suctioning): strong cough with PMV on, small to mod amount of pale yellow secretions                              WEANING PHASE:   2                        WEAN MODE: 30% HF 20L TM/ PMV                        WEAN TIME:  TM cont as tolerated, PMV on since 1102                        END WEAN REASON:      RESPONSE:             BS:   coarse to slightly coarse  T/O - clear decreased after Sx              VITAL SIGNS: O2 sat 90-97%, HR 86-94, RR 20             EMOTIONAL NEEDS / CONCERNS:  None               RISK FOR SELF DECANNULATION:  Yes                        RISK DUE TO: Confusion                        INTERVENTION/S IN PLACE IS/ARE: Restraints x2       NOTE / PLAN:  pulmonologist recommended PMV for the day as tolerated. Cont current plan of care - TM with PMV during, PMV off at night.

## 2021-08-04 NOTE — PROGRESS NOTES
Palliative Care Progress Note       Recommendations & Counseling       Call out to Pulmonary to help indicate whether they feel patient needs more time or if safe to consider trach chronic at this time.  Knowing either way will help with disposition.      See neuro note(8/2), and pulm note(7/30) for prognostication or see summary below.    I called wife Susy, no answer.    No med changes from our service today             Assessments          70 yoM with PMH HTN admitted 5/15/21  for AMS and a fall. Found to have acute subarachnoid hemorrhage and underwent left external ventricular drain placement.  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  Course complicated by CT head showing bilateral BAYLEE infarct.  Extubated 5/16, re-intubated 5/19, extubated again 6/3/2021.  Re-intubated again 6/6/2021 d/t hypoxia.  S/p trach/PEG on 6/7/2021, EVD removed 6/9/2021.  Admitted to LTACH 6/11.  Aspirated tube feeds 7/8; antibiotics 7/9-7/18.  Liberated from vent 7/19.    1. Encephalopathy in setting of SAH, b/l infarct.  Started on Ritalin 7/7, backed down d/t insomnia.  Started on Effexor and uptitrated 7/20 per [facility].  Slow progression and seems to have plateaued.  Still requires b/l hand mitts d/t pulling at lines.   2. Insomnia likely d/t ritalin also being scheduled at bedtime?  Better now that on morning ritalin.  No current concerns.  Also on mirtazapine.  3. Dyspnea in setting of hypoxic resp failure related to SAH and CVA s/p trach.  Issues with tracheal secretions on scopolamine patch.  Improved overnight per RT.  4. Weakness - PT/OT/SLP.  Ritalin as above.   Last OT note 7/21 indicating no recent improvements to goals.  No recent PT notes, last I see is on 7/12    Today, the patient was seen for:  Goals of care, symptom mgmt    Prognosis, Goals, or Advance Care Planning was addressed today with: No.  Mood, coping, and/or meaning in the context of serious illness were  addressed today: No.    Advanced Care Planning: No directive.  Surrogate decision maker is wife Susy    Code Status: Full    Psychosocial/spiritual: Mandaeism.  Lots of support from wife and daughter.  Palliative SW actively following.    Prognosis:  See Pulm note 7/30: Pulm status could improve if cognition improves  See Neuro note 8/2: Full extent of neuro recovery can't be predicted.  Some patients do make improvements after a CVA up to a yr, but max improvement is seen in the first 6 months.     Key Palliative Symptoms:  # Pain severity the last 12 hours: none  # Dyspnea severity the last 12 hours: none  # Nausea severity the last 12 hours: none  # Anxiety severity the last 12 hours: none     Goals of care per wife: Continue restorative measures with no indication for care limitations.  Progress towards decannulation as able.  Hoping for continued neurologic improvement.            Interval History:     Chart review/discussion with unit or clinical team members:   Emesis yesterday with no s/s aspiration.  Suctioned frequently yesterday, less overnight.     Per patient or family/caregivers today:  I visited patient at bedside.   I heard wife on iPad upon entering.  When I said hello  to patient, wife was then absent.  I called out her name to see if she was still on the iPad but no response.  Difficult to obtain meaningful ROS as patient's voice is extremely soft.  No unmet symptom burden identified at this time.     I later called wife via telephone but no answer.             Review of Systems:     A full 14 point review of systems was otherwise completed and is negative aside from that mentioned above          Medications:     I have reviewed this patient's medication profile and medications during this hospitalization.      acetylcysteine  2 mL Nebulization BID     famotidine  20 mg Oral or Feeding Tube BID     fiber modular (NUTRISOURCE FIBER)  3 packet Per Feeding Tube TID     heparin ANTICOAGULANT  5,000  "Units Subcutaneous Q8H     ipratropium - albuterol 0.5 mg/2.5 mg/3 mL  3 mL Nebulization BID     lisinopril  40 mg Oral or Feeding Tube Daily     menthol-zinc oxide   Topical TID     methylphenidate  10 mg Oral or Feeding Tube QAM AC     mirtazapine  7.5 mg Per Feeding Tube At Bedtime     scopolamine  1 patch Transdermal Q72H    And     scopolamine   Transdermal Q8H     sodium chloride  3 mL Nebulization BID     venlafaxine  37.5 mg Per Feeding Tube TID w/meals     acetaminophen **OR** acetaminophen, alteplase, bisacodyl, dextrose, [DISCONTINUED] glucose **OR** dextrose **OR** glucagon, docusate **OR** docusate sodium, hydrALAZINE, loperamide, magnesium hydroxide, naloxone **OR** naloxone **OR** naloxone **OR** naloxone, ondansetron, polyethylene glycol, sodium chloride           Physical Exam:   Blood pressure 118/74, pulse 92, temperature 98.6  F (37  C), resp. rate 20, height 1.803 m (5' 10.98\"), weight 72 kg (158 lb 11.2 oz), SpO2 95 %.  GENERAL: sitting in chair in NAD    SKIN: Warm and dry   HEENT: Normocephalic, anicteric sclera, moist mucous membranes  LUNGS: Coarse to auscultation anterolaterally; non-labored   CARDIAC: RRR, No ROSE  ABDOMINAL: BS+, soft, non distended, non tender  EXTREMITIES: No edema or cyanosis, pulses 2+ and symmetrical  NEUROLOGIC: Alert, follows commands intermittently  PSYCH: calm             Data Reviewed:     All labs/imaging reviewed in Epic     ====================================================  TT: I have personally spent a total of 25 minutes on the unit in review of medical record, consultation with the medical providers and assessment of patient today, with more than 50% of this time spent in counseling, coordination of care, and discussion with patient and staff re: prognosis, symptom management, and development of plan of care as noted above.  ====================================================    Maximus Mathis [facility]  Palliative Medicine  Office: " [phone]

## 2021-08-05 ENCOUNTER — APPOINTMENT (OUTPATIENT)
Dept: OCCUPATIONAL THERAPY | Facility: CLINIC | Age: 71
DRG: 207 | End: 2021-08-05
Attending: HOSPITALIST
Payer: COMMERCIAL

## 2021-08-05 ENCOUNTER — APPOINTMENT (OUTPATIENT)
Dept: SPEECH THERAPY | Facility: CLINIC | Age: 71
DRG: 207 | End: 2021-08-05
Attending: HOSPITALIST
Payer: COMMERCIAL

## 2021-08-05 ENCOUNTER — APPOINTMENT (OUTPATIENT)
Dept: PHYSICAL THERAPY | Facility: CLINIC | Age: 71
DRG: 207 | End: 2021-08-05
Attending: HOSPITALIST
Payer: COMMERCIAL

## 2021-08-05 PROCEDURE — 97530 THERAPEUTIC ACTIVITIES: CPT | Mod: GO | Performed by: OCCUPATIONAL THERAPIST

## 2021-08-05 PROCEDURE — 999N000157 HC STATISTIC RCP TIME EA 10 MIN

## 2021-08-05 PROCEDURE — 250N000013 HC RX MED GY IP 250 OP 250 PS 637: Performed by: INTERNAL MEDICINE

## 2021-08-05 PROCEDURE — 999N000123 HC STATISTIC OXYGEN O2DAILY TECH TIME

## 2021-08-05 PROCEDURE — 250N000013 HC RX MED GY IP 250 OP 250 PS 637

## 2021-08-05 PROCEDURE — 250N000013 HC RX MED GY IP 250 OP 250 PS 637: Performed by: HOSPITALIST

## 2021-08-05 PROCEDURE — 250N000009 HC RX 250: Performed by: INTERNAL MEDICINE

## 2021-08-05 PROCEDURE — 999N000009 HC STATISTIC AIRWAY CARE

## 2021-08-05 PROCEDURE — 97110 THERAPEUTIC EXERCISES: CPT | Mod: GP | Performed by: PHYSICAL THERAPIST

## 2021-08-05 PROCEDURE — 92526 ORAL FUNCTION THERAPY: CPT | Mod: GN | Performed by: SPEECH-LANGUAGE PATHOLOGIST

## 2021-08-05 PROCEDURE — 94640 AIRWAY INHALATION TREATMENT: CPT | Mod: 76

## 2021-08-05 PROCEDURE — 92507 TX SP LANG VOICE COMM INDIV: CPT | Mod: GN | Performed by: SPEECH-LANGUAGE PATHOLOGIST

## 2021-08-05 PROCEDURE — 250N000011 HC RX IP 250 OP 636

## 2021-08-05 PROCEDURE — 94668 MNPJ CHEST WALL SBSQ: CPT

## 2021-08-05 PROCEDURE — 94640 AIRWAY INHALATION TREATMENT: CPT

## 2021-08-05 PROCEDURE — 97530 THERAPEUTIC ACTIVITIES: CPT | Mod: GP | Performed by: PHYSICAL THERAPIST

## 2021-08-05 PROCEDURE — 120N000017 HC R&B RESPIRATORY CARE

## 2021-08-05 PROCEDURE — 97535 SELF CARE MNGMENT TRAINING: CPT | Mod: GO | Performed by: OCCUPATIONAL THERAPIST

## 2021-08-05 PROCEDURE — 94660 CPAP INITIATION&MGMT: CPT

## 2021-08-05 PROCEDURE — 97112 NEUROMUSCULAR REEDUCATION: CPT | Mod: GP | Performed by: PHYSICAL THERAPIST

## 2021-08-05 RX ADMIN — IPRATROPIUM BROMIDE AND ALBUTEROL SULFATE 3 ML: 2.5; .5 SOLUTION RESPIRATORY (INHALATION) at 20:19

## 2021-08-05 RX ADMIN — SODIUM CHLORIDE 30 MG/ML INHALATION SOLUTION 3 ML: 30 SOLUTION INHALANT at 07:26

## 2021-08-05 RX ADMIN — HEPARIN SODIUM 5000 UNITS: 5000 INJECTION, SOLUTION INTRAVENOUS; SUBCUTANEOUS at 15:15

## 2021-08-05 RX ADMIN — VENLAFAXINE 37.5 MG: 37.5 TABLET ORAL at 11:45

## 2021-08-05 RX ADMIN — ANORECTAL OINTMENT: 15.7; .44; 24; 20.6 OINTMENT TOPICAL at 08:05

## 2021-08-05 RX ADMIN — FAMOTIDINE 20 MG: 20 TABLET, FILM COATED ORAL at 20:27

## 2021-08-05 RX ADMIN — SODIUM CHLORIDE 30 MG/ML INHALATION SOLUTION 3 ML: 30 SOLUTION INHALANT at 20:19

## 2021-08-05 RX ADMIN — ACETYLCYSTEINE 2 ML: 200 SOLUTION ORAL; RESPIRATORY (INHALATION) at 07:27

## 2021-08-05 RX ADMIN — LISINOPRIL 40 MG: 20 TABLET ORAL at 20:26

## 2021-08-05 RX ADMIN — Medication 3 PACKET: at 08:04

## 2021-08-05 RX ADMIN — VENLAFAXINE 37.5 MG: 37.5 TABLET ORAL at 16:42

## 2021-08-05 RX ADMIN — VENLAFAXINE 37.5 MG: 37.5 TABLET ORAL at 08:04

## 2021-08-05 RX ADMIN — ACETAMINOPHEN 650 MG: 325 TABLET ORAL at 11:45

## 2021-08-05 RX ADMIN — FAMOTIDINE 20 MG: 20 TABLET, FILM COATED ORAL at 08:04

## 2021-08-05 RX ADMIN — IPRATROPIUM BROMIDE AND ALBUTEROL SULFATE 3 ML: 2.5; .5 SOLUTION RESPIRATORY (INHALATION) at 07:26

## 2021-08-05 RX ADMIN — MIRTAZAPINE 7.5 MG: 7.5 TABLET ORAL at 20:27

## 2021-08-05 RX ADMIN — METHYLPHENIDATE HYDROCHLORIDE 10 MG: 10 TABLET ORAL at 05:47

## 2021-08-05 RX ADMIN — Medication 3 PACKET: at 21:30

## 2021-08-05 RX ADMIN — HEPARIN SODIUM 5000 UNITS: 5000 INJECTION, SOLUTION INTRAVENOUS; SUBCUTANEOUS at 21:29

## 2021-08-05 RX ADMIN — Medication 3 PACKET: at 15:15

## 2021-08-05 RX ADMIN — ACETYLCYSTEINE 2 ML: 200 SOLUTION ORAL; RESPIRATORY (INHALATION) at 20:19

## 2021-08-05 RX ADMIN — ANORECTAL OINTMENT: 15.7; .44; 24; 20.6 OINTMENT TOPICAL at 15:15

## 2021-08-05 RX ADMIN — HEPARIN SODIUM 5000 UNITS: 5000 INJECTION, SOLUTION INTRAVENOUS; SUBCUTANEOUS at 05:47

## 2021-08-05 RX ADMIN — ANORECTAL OINTMENT: 15.7; .44; 24; 20.6 OINTMENT TOPICAL at 20:28

## 2021-08-05 NOTE — PROGRESS NOTES
[facility]    Medicine Progress Note - Hospitalist Service       Date of Admission:  6/11/2021    Identifier:  70-year-old retired man who lived with his wife at home prehospitalization, who originally presented to [facility] on May 15, 2021 for altered mental state. He had sustained a fall and was found to be unresponsive by his wife.  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  He was treated with milrinone and verapamil.  He was successfully extubated on Pamela 3 and on June 6 he was reintubated due to drop in his saturations and underwent tracheostomy placement as well as feeding tube placement on June 7.  June 9: EVD was removed.  He was transferred to LTAC on June 11  In the last 1 month he has not been able to be liberated from the ventilator.  He has had repeated bouts of Klebsiella infection in sputum and ventilator associated pneumonia.  Additionally he has had a large amount of tracheal secretions, significant cognitive impairment, left-sided neglect and ongoing episodes of agitation.  Patient's family has been monitoring the patient continuously during the daytime hours via video monitor and visit with him in person over the weekends.  Multiple specialties have been involved in the care of this patient including palliative care, infectious disease pulmonary medicine and neurology    Assessment & Plan                    1/.  Acute hypoxemic respiratory failure secondary to prolonged hospitalization, significant intracranial hemorrhage.  - excessive thick white secretions is the main barrier to decannulation   - glycopyrolate was d/thang 2  weeks back per pulmonary medicine  -Continues to be on a scopolamine patch  - RT reporting continued copious thick white secretions  - Speaking valve is being attempted in short trials during the day. Patient tolerated PSV for several hours yesterday    2/.  Insomnia: Ritalin twice a day, Ritalin at bedtime was probably making insomnia worsen. Bedtime Ritalin stopped 8/3/21 and continues dose of once daily in the morning.  - Started with nighttime mirtazapine 7.5 mg scheduled. Can be titrated up as appropriate.     3/.  Traumatic brain injury/intracranial hemorrhage: Effexor was started about 2 weeks ago at 25 mg 3 times a day, uptitrated to 37.5 mg 3 times a day, family informed    4/.  Dysphagia: Secondary to respiratory failure continue with tube feeds at this time.  Even though the patient passed a blue dye test about a week ago but he still has a lot of secretions    5/.  Ventilator associated Klebsiella pneumonia, pseudomonas ESBL  -  appreciate intervention and recommendations by infectious disease, antibiotics course completed 7/18.    - Repeat sputum culture if yellow or increased secretions.     6/.  Prognosis and logistics: This is challenge because patient's wife is definitely having a difficult time with the situation and recognizing that the patient is unlikely going to be his prehospitalization self.     7/.  Restraints: Patient still restless, pulling at tubes. We have been using hand mitts and that seems to be eliminating the need for a sitter. Order renewed today. Staff are taking the mitts off during their assessments and for hygiene cares.            Diet: Adult Formula Drip Feeding: Continuous Vital 1.5; Gastrostomy; Goal Rate: 90; mL/hr; Medication - Feeding Tube Flush Frequency: At least 15-30 mL water before and after medication administration and with tube clogging; Amount to Send (Nutrition us...    DVT Prophylaxis: Heparin SQ  Gutierrez Catheter: Not present  Central Lines: None  Code Status:  "Full Code      Disposition Plan   Expected discharge: Unclear at this time. Plan for SNF once decannulated       Total floor/unit time spent was 25 minutes in reviewing the record, medications, lab results and completing documentation. 15 minutes spent in counseling and discussion with patient regarding diagnosis, medications and treatment plan. Care discussed and coordinated with patient and nurse.     SISI Moore [facility]  Hospitalist Service  [facility]  Securely message with the Vocera Web Console (learn more here)  Text page via [employer] Paging/Directory                 ______________________________________________________________________    Interval History   Per nursing no new concerns. Patient was stable overnight. Respiratory therapy reporting still a lot of thick, white secretions. Continues to be fidgety at times and is wearing bilateral hand mitts for safety. Patient is alert, lying in bed, appears comfortable with no signs of distress. Face looks slightly flushed. I asked patient if he is hot and he nods \"yes.\" Patient attempts to mouth words in response to questions. Football is on TV and I ask him if he likes football and he mouths, \"no.\" He also will raise his arms up in the air making gestures. When I ask him if he is having pain he mouths, \"no.\" On exam scrotum is swollen. Per nursing this is not a new finding and he's had scrotal edema. Nursing is getting patient up once per shift. Yesterday was up in chair for 2 hours during the day. Per nursing no fevers, labored breathing, vomiting. Occasionally has a loose stool due to tube feeding.      Review of Systems: 10 point review of systems negative except for pertinent positives mentioned in HPI     Data reviewed today: I reviewed all medications, new labs and imaging results over the last 24 hours. No images or EKG's today.    Physical Exam   Vital Signs: Temp: 98.4  F (36.9  C) Temp src: Axillary BP: 138/78 Pulse: 95   Resp: 18 SpO2: " 98 % O2 Device: Trach dome Oxygen Delivery: 20 LPM  Weight: 158 lbs 11.2 oz     General Appearance:  Alert, not as fidgety this morning, rasing arms up with hand mitts x 2 on, no apparent distress   Respiratory: slightly coarse anterior breath sounds, no wheezing or crackles, tracheostomy patent with no visible secretions, surrounding skin around trach dry/intact  Cardiovascular: S1,S2, rhythm rate regular, negative murmur   GI: Bowel sounds positive x 4, non distended non tender, G/J tube appears patent with tube feeding infusing, surrounding skin dry/intact  Skin: face slightly flushed, pink, warm, dry and intact, no rash or open sores, moderate scrotal edema   Neurological: alert and oriented x 1, nonverbal due to trach, moves upper extremities, able to lift right arm all the way up, moving left arm up but weaker compared to right with chronic left sided weakness, inconsistent following commands      Data   Medications     dextrose       sodium chloride         acetylcysteine  2 mL Nebulization BID     famotidine  20 mg Oral or Feeding Tube BID     fiber modular (NUTRISOURCE FIBER)  3 packet Per Feeding Tube TID     heparin ANTICOAGULANT  5,000 Units Subcutaneous Q8H     ipratropium - albuterol 0.5 mg/2.5 mg/3 mL  3 mL Nebulization BID     lisinopril  40 mg Oral or Feeding Tube Daily     menthol-zinc oxide   Topical TID     methylphenidate  10 mg Oral or Feeding Tube QAM AC     mirtazapine  7.5 mg Per Feeding Tube At Bedtime     scopolamine  1 patch Transdermal Q72H    And     scopolamine   Transdermal Q8H     sodium chloride  3 mL Nebulization BID     venlafaxine  37.5 mg Per Feeding Tube TID w/meals

## 2021-08-05 NOTE — PLAN OF CARE
Problem: Restraint for Non-Violent/Non-Self-Destructive Behavior  Goal: Prevent/Manage Potential Problems  Description: Maintain safety of patient and others during period of restraint.  Promote psychological and physical wellbeing.  Prevent injury to skin and involved body parts.  Promote nutrition, hydration, and elimination.  Outcome: No Change   Pt alert coop with cares, incontinent of urine condom cath in place, cont to assess for restraint reduction, sally mitts in place, anticipate needs, I pad on at bedside

## 2021-08-05 NOTE — PLAN OF CARE
Problem: Device-Related Complication Risk (Artificial Airway)  Goal: Optimal Device Function  Outcome: Improving     Problem: Skin and Tissue Injury (Artificial Airway)  Goal: Absence of Device-Related Skin or Tissue Injury  Outcome: Improving  RT PROGRESS NOTE     DATA:     CURRENT SETTINGS:             TRACH TYPE / SIZE: #6 Bivona (Placed 07/28)             MODE:  TM             FIO2:   20 L, 30%     ACTION:             THERAPIES: Duo-neb /Saline /Metaneb BID             SUCTION:                           FREQUENCY: 5x                        AMOUNT:   mod to copious                         CONSISTENCY: Thick/thin                        COLOR:   Pale yellow             SPONTANEOUS COUGH EFFORT/STRENGTH OF EFFORT (not elicited by suctioning): good                              WEANING PHASE:   2                        WEAN MODE: TM                         WEAN TIME:  as tolerated                        END WEAN REASON:        RESPONSE:             BS:   Coarse             VITAL SIGNS: Sat 91-96%, HR 95-98, RR 20-22             EMOTIONAL NEEDS / CONCERNS:  No                RISK FOR SELF DECANNULATION:  Yes                        RISK DUE TO:  Confusion                        INTERVENTION/S IN PLACE IS/ARE:Mittens in place       NOTE / PLAN: Pt wore PMV for 8hrs 50min & tolerated well. PMV off/cuff up for noc. Continue current POC

## 2021-08-05 NOTE — PLAN OF CARE
Problem: Communication Impairment (Artificial Airway)  Goal: Effective Communication  Outcome: Improving     Problem: Device-Related Complication Risk (Artificial Airway)  Goal: Optimal Device Function  Outcome: Improving     Problem: Skin and Tissue Injury (Artificial Airway)  Goal: Absence of Device-Related Skin or Tissue Injury  Outcome: Improving   RT PROGRESS NOTE     DATA:     CURRENT SETTINGS:               TRACH TYPE / SIZE: #6 BIVONA TTS Changed on 7/28/21             MODE: TM             FIO2:  30%@30 L/MIN     ACTION:             THERAPIES: ,DuoNeb/ Mucomyst /Sodium Chloride neb BID, MetaNeb BID                SUCTION:                           FREQUENCY: x4                        AMOUNT: Small to large                        CONSISTENCY: thick/thin                        COLOR:  White/tan             SPONTANEOUS COUGH EFFORT/STRENGTH OF EFFORT (not elicited by suctioning): Fair                               WEANING PHASE: 2                        WEAN MODE: PMV days as reji and cuff up at night                         WEAN TIME:  SINCE 09:05  tolerating                        END WEAN REASON: ongoing      RESPONSE:             BS:  Clear Decreased              VITAL SIGNS: Sat 93-98, HR 88-95, RR 22             EMOTIONAL NEEDS / CONCERNS:confused               RISK FOR SELF DECANNULATION: yes                        RISK DUE TO:  Confused                         INTERVENTION/S IN PLACE IS/ARE: Bilateral  hand  Mittens      NOTE / PLAN:  Cont. Current plan of care

## 2021-08-05 NOTE — PLAN OF CARE
Problem: Restraint for Non-Violent/Non-Self-Destructive Behavior  Goal: Prevent/Manage Potential Problems  Description: Maintain safety of patient and others during period of restraint.  Promote psychological and physical wellbeing.  Prevent injury to skin and involved body parts.  Promote nutrition, hydration, and elimination.  8/5/2021 0740 by Gail Palmer RN  Outcome: No Change  8/5/2021 0737 by Gail Palmer RN  Outcome: No Change  8/5/2021 0731 by Gail Palmer RN  Outcome: No Change   Patient vital sign remain stable throughout the shift and patient continue on restraint for impulsive behavior, No PRN was given on this shift.

## 2021-08-06 ENCOUNTER — APPOINTMENT (OUTPATIENT)
Dept: OCCUPATIONAL THERAPY | Facility: CLINIC | Age: 71
DRG: 207 | End: 2021-08-06
Attending: HOSPITALIST
Payer: COMMERCIAL

## 2021-08-06 ENCOUNTER — APPOINTMENT (OUTPATIENT)
Dept: SPEECH THERAPY | Facility: CLINIC | Age: 71
DRG: 207 | End: 2021-08-06
Attending: HOSPITALIST
Payer: COMMERCIAL

## 2021-08-06 LAB
ANION GAP SERPL CALCULATED.3IONS-SCNC: 9 MMOL/L (ref 5–18)
BUN SERPL-MCNC: 37 MG/DL (ref 8–22)
CALCIUM SERPL-MCNC: 10.6 MG/DL (ref 8.5–10.5)
CHLORIDE BLD-SCNC: 99 MMOL/L (ref 98–107)
CO2 SERPL-SCNC: 31 MMOL/L (ref 22–31)
CREAT SERPL-MCNC: 0.78 MG/DL (ref 0.7–1.3)
GFR SERPL CREATININE-BSD FRML MDRD: >90 ML/MIN/1.73M2
GLUCOSE BLD-MCNC: 111 MG/DL (ref 70–125)
POTASSIUM BLD-SCNC: 4.7 MMOL/L (ref 3.5–5)
SARS-COV-2 RNA RESP QL NAA+PROBE: NEGATIVE
SODIUM SERPL-SCNC: 139 MMOL/L (ref 136–145)

## 2021-08-06 PROCEDURE — 120N000017 HC R&B RESPIRATORY CARE

## 2021-08-06 PROCEDURE — 250N000013 HC RX MED GY IP 250 OP 250 PS 637: Performed by: INTERNAL MEDICINE

## 2021-08-06 PROCEDURE — 94003 VENT MGMT INPAT SUBQ DAY: CPT | Performed by: INTERNAL MEDICINE

## 2021-08-06 PROCEDURE — 999N000009 HC STATISTIC AIRWAY CARE

## 2021-08-06 PROCEDURE — 97530 THERAPEUTIC ACTIVITIES: CPT | Mod: GO | Performed by: OCCUPATIONAL THERAPIST

## 2021-08-06 PROCEDURE — 94640 AIRWAY INHALATION TREATMENT: CPT | Mod: 76

## 2021-08-06 PROCEDURE — 94640 AIRWAY INHALATION TREATMENT: CPT

## 2021-08-06 PROCEDURE — 94660 CPAP INITIATION&MGMT: CPT

## 2021-08-06 PROCEDURE — 97530 THERAPEUTIC ACTIVITIES: CPT | Mod: GP | Performed by: PHYSICAL THERAPIST

## 2021-08-06 PROCEDURE — 999N000157 HC STATISTIC RCP TIME EA 10 MIN

## 2021-08-06 PROCEDURE — 250N000009 HC RX 250: Performed by: INTERNAL MEDICINE

## 2021-08-06 PROCEDURE — 250N000013 HC RX MED GY IP 250 OP 250 PS 637

## 2021-08-06 PROCEDURE — 92507 TX SP LANG VOICE COMM INDIV: CPT | Mod: GN

## 2021-08-06 PROCEDURE — 87635 SARS-COV-2 COVID-19 AMP PRB: CPT | Performed by: NURSE PRACTITIONER

## 2021-08-06 PROCEDURE — 92526 ORAL FUNCTION THERAPY: CPT | Mod: GN

## 2021-08-06 PROCEDURE — 82306 VITAMIN D 25 HYDROXY: CPT | Performed by: INTERNAL MEDICINE

## 2021-08-06 PROCEDURE — 97112 NEUROMUSCULAR REEDUCATION: CPT | Mod: GP | Performed by: PHYSICAL THERAPIST

## 2021-08-06 PROCEDURE — 36415 COLL VENOUS BLD VENIPUNCTURE: CPT | Performed by: HOSPITALIST

## 2021-08-06 PROCEDURE — 250N000011 HC RX IP 250 OP 636

## 2021-08-06 PROCEDURE — 97110 THERAPEUTIC EXERCISES: CPT | Mod: GP | Performed by: PHYSICAL THERAPIST

## 2021-08-06 PROCEDURE — 94668 MNPJ CHEST WALL SBSQ: CPT

## 2021-08-06 PROCEDURE — 250N000013 HC RX MED GY IP 250 OP 250 PS 637: Performed by: HOSPITALIST

## 2021-08-06 PROCEDURE — 82310 ASSAY OF CALCIUM: CPT | Performed by: HOSPITALIST

## 2021-08-06 RX ADMIN — Medication 3 PACKET: at 09:06

## 2021-08-06 RX ADMIN — FAMOTIDINE 20 MG: 20 TABLET, FILM COATED ORAL at 09:06

## 2021-08-06 RX ADMIN — MIRTAZAPINE 7.5 MG: 7.5 TABLET ORAL at 21:27

## 2021-08-06 RX ADMIN — IPRATROPIUM BROMIDE AND ALBUTEROL SULFATE 3 ML: 2.5; .5 SOLUTION RESPIRATORY (INHALATION) at 07:25

## 2021-08-06 RX ADMIN — METHYLPHENIDATE HYDROCHLORIDE 10 MG: 10 TABLET ORAL at 05:52

## 2021-08-06 RX ADMIN — Medication 3 PACKET: at 14:08

## 2021-08-06 RX ADMIN — VENLAFAXINE 37.5 MG: 37.5 TABLET ORAL at 09:06

## 2021-08-06 RX ADMIN — SODIUM CHLORIDE 30 MG/ML INHALATION SOLUTION 3 ML: 30 SOLUTION INHALANT at 07:26

## 2021-08-06 RX ADMIN — ANORECTAL OINTMENT: 15.7; .44; 24; 20.6 OINTMENT TOPICAL at 09:06

## 2021-08-06 RX ADMIN — VENLAFAXINE 37.5 MG: 37.5 TABLET ORAL at 14:02

## 2021-08-06 RX ADMIN — ANORECTAL OINTMENT: 15.7; .44; 24; 20.6 OINTMENT TOPICAL at 21:32

## 2021-08-06 RX ADMIN — HEPARIN SODIUM 5000 UNITS: 5000 INJECTION, SOLUTION INTRAVENOUS; SUBCUTANEOUS at 14:02

## 2021-08-06 RX ADMIN — SODIUM CHLORIDE 30 MG/ML INHALATION SOLUTION 3 ML: 30 SOLUTION INHALANT at 19:36

## 2021-08-06 RX ADMIN — HEPARIN SODIUM 5000 UNITS: 5000 INJECTION, SOLUTION INTRAVENOUS; SUBCUTANEOUS at 05:52

## 2021-08-06 RX ADMIN — FAMOTIDINE 20 MG: 20 TABLET, FILM COATED ORAL at 21:27

## 2021-08-06 RX ADMIN — LISINOPRIL 40 MG: 20 TABLET ORAL at 18:10

## 2021-08-06 RX ADMIN — ANORECTAL OINTMENT: 15.7; .44; 24; 20.6 OINTMENT TOPICAL at 14:03

## 2021-08-06 RX ADMIN — HEPARIN SODIUM 5000 UNITS: 5000 INJECTION, SOLUTION INTRAVENOUS; SUBCUTANEOUS at 21:27

## 2021-08-06 RX ADMIN — VENLAFAXINE 37.5 MG: 37.5 TABLET ORAL at 18:11

## 2021-08-06 RX ADMIN — Medication 3 PACKET: at 21:26

## 2021-08-06 RX ADMIN — IPRATROPIUM BROMIDE AND ALBUTEROL SULFATE 3 ML: 2.5; .5 SOLUTION RESPIRATORY (INHALATION) at 19:35

## 2021-08-06 RX ADMIN — ACETYLCYSTEINE 2 ML: 200 SOLUTION ORAL; RESPIRATORY (INHALATION) at 07:25

## 2021-08-06 RX ADMIN — ACETYLCYSTEINE 2 ML: 200 SOLUTION ORAL; RESPIRATORY (INHALATION) at 19:35

## 2021-08-06 ASSESSMENT — MIFFLIN-ST. JEOR: SCORE: 1462.29

## 2021-08-06 NOTE — PLAN OF CARE
Problem: Restraint for Non-Violent/Non-Self-Destructive Behavior  Goal: Prevent/Manage Potential Problems  Description: Maintain safety of patient and others during period of restraint.  Promote psychological and physical wellbeing.  Prevent injury to skin and involved body parts.  Promote nutrition, hydration, and elimination.  Outcome: No Change   Patient vital sign stable, appears comfortable and slept most of the shift, no PRN given on this shift.

## 2021-08-06 NOTE — PROGRESS NOTES
[facility]    Medicine Progress Note - Hospitalist Service       Date of Admission:  6/11/2021    Identifier:  70-year-old retired man who lived with his wife at home prehospitalization, who originally presented to [facility] on May 15, 2021 for altered mental state. He had sustained a fall and was found to be unresponsive by his wife.  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  He was treated with milrinone and verapamil.  He was successfully extubated on Pamela 3 and on June 6 he was reintubated due to drop in his saturations and underwent tracheostomy placement as well as feeding tube placement on June 7.  June 9: EVD was removed.  He was transferred to LTAC on June 11  In the last 1 month he has not been able to be liberated from the ventilator.  He has had repeated bouts of Klebsiella infection in sputum and ventilator associated pneumonia.  Additionally he has had a large amount of tracheal secretions, significant cognitive impairment, left-sided neglect and ongoing episodes of agitation.  Patient's family has been monitoring the patient continuously during the daytime hours via video monitor and visit with him in person over the weekends.  Multiple specialties have been involved in the care of this patient including palliative care, infectious disease pulmonary medicine and neurology     Assessment & Plan                    1/.  Acute hypoxemic respiratory failure secondary to prolonged hospitalization, significant intracranial hemorrhage.  - excessive thick white secretions is the main barrier to decannulation   - glycopyrolate was d/thang 2  weeks back per pulmonary medicine  -Continues to be on a scopolamine patch  - RT reporting moderate-large secretions requiring suctioning x 4   - Consider sputum culture if increased or yellow secretions   - Tolerating passy catie valve during the day     2/.  Insomnia: Ritalin twice a day, Ritalin at bedtime was probably making insomnia worsen. Bedtime Ritalin stopped 8/3/21 and continues dose of once daily in the morning.  - Started with nighttime mirtazapine 7.5 mg scheduled. Can be uptitrated as appropriate.     3/.  Traumatic brain injury/intracranial hemorrhage: Effexor was started about 2 weeks ago at 25 mg 3 times a day, uptitrated today to 37.5 mg 3 times a day, family informed    4/.  Dysphagia: Secondary to respiratory failure continue with tube feeds at this time.  Even though the patient passed a blue dye test about a week ago but he still has a lot of secretions    5/.  Ventilator associated pneumonia appreciate intervention and recommendations by infectious disease antibiotics to continued till 7/18. Off all Abx now. Repeat sputum culture if yellow or increased secretions.     6/.  Prognosis: This is challenge because patient's wife is having a difficult time with the situation and recognizing that the patient is unlikely going to be his prehospitalization self.     7/.  Restraints: We have been using hand mitts and that seems to be eliminating the need for a sitter. Order renewed today. Spoke to nurse who will observe patient with mitts off and see if he pulls at tubes. Consider abdominal binder to protect G-tube from being pulled.            Diet: Adult Formula Drip Feeding: Continuous Vital 1.5; Gastrostomy; Goal Rate: 90; mL/hr; Medication - Feeding Tube Flush Frequency: At least 15-30 mL water before and after medication administration and with tube clogging; Amount to Send (Nutrition us...    DVT Prophylaxis: Low Risk/Ambulatory with no VTE prophylaxis indicated  Gutierrez Catheter: Not  "present  Central Lines: None  Code Status: Full Code      Disposition Plan   Expected discharge:  Plan for SNF, date to be determined. SNF requires oral/tracheostomy suctioning no more than 3 times daily, and discontinuation of hand mitts prior to discharge     Total floor/unit time spent was 25 minutes in reviewing the record, medications, lab results and completing documentation. 15 minutes spent in counseling and discussion with patient regarding diagnosis, medications and treatment plan. Care discussed and coordinated with patient and nurse.       SISI Moore [facility]  Hospitalist Service  [facility]  Securely message with the Vocera Web Console (learn more here)  Text page via [facility] Paging/Directory               ______________________________________________________________________    Interval History   Per nursing no new concerns. Patient was stable overnight. Respiratory therapy reporting moderate to large tracheal secretions, thick, white/cloudy consistency. Fidgety at times and is wearing bilateral hand mitts for safety. Patient is alert, lying in bed, makes eye contact, appears comfortable with no signs of distress. Patient attempts to mouth and whisper words in response to questions. I asked him if he is in pain and he mouths, \"no.\" I ask him how he is doing and he mouths, \"pretty good.\" Nursing is getting patient up once per shift. Per nursing no fevers, labored breathing, pain, or vomiting. Occasionally has a loose stool due to tube feeding.      Review of Systems: 10 point review of systems negative except for pertinent positives mentioned in HPI     Data reviewed today: I reviewed all medications, new labs and imaging results over the last 24 hours. No images or EKG's today.     Physical Exam   Vital Signs: Temp: 97.7  F (36.5  C) Temp src: Oral BP: 136/77 Pulse: 98   Resp: 24 SpO2: 99 % O2 Device: Trach dome Oxygen Delivery: 30 LPM  Weight: 147 lbs 12.8 oz     General Appearance:  Alert, not as " fidgety this morning, rasing arms up with hand mitts x 2 on, no apparent distress   Respiratory: slightly coarse anterior breath sounds, no wheezing or crackles, tracheostomy patent with no visible secretions, surrounding skin around trach dry/intact  Cardiovascular: S1,S2, rhythm rate regular, negative murmur   GI: Bowel sounds positive x 4, non distended non tender, G/J tube appears patent with tube feeding infusing, surrounding skin dry/intact  Skin: face slightly flushed, pink, warm, dry and intact, no rash or open sore  Neurological: alert and oriented x 1, nonverbal due to trach, whispers/mouths words, moves upper extremities, able to lift right arm all the way up, moving left arm up but weaker compared to right with chronic left sided neglet, inconsistent following commands        Data   Medications     dextrose       sodium chloride         acetylcysteine  2 mL Nebulization BID     famotidine  20 mg Oral or Feeding Tube BID     fiber modular (NUTRISOURCE FIBER)  3 packet Per Feeding Tube TID     heparin ANTICOAGULANT  5,000 Units Subcutaneous Q8H     ipratropium - albuterol 0.5 mg/2.5 mg/3 mL  3 mL Nebulization BID     lisinopril  40 mg Oral or Feeding Tube Daily     menthol-zinc oxide   Topical TID     methylphenidate  10 mg Oral or Feeding Tube QAM AC     mirtazapine  7.5 mg Per Feeding Tube At Bedtime     scopolamine  1 patch Transdermal Q72H    And     scopolamine   Transdermal Q8H     sodium chloride  3 mL Nebulization BID     venlafaxine  37.5 mg Per Feeding Tube TID w/meals

## 2021-08-06 NOTE — PLAN OF CARE
Problem: Adult Inpatient Plan of Care  Goal: Plan of Care Review  Outcome: No Change     Problem: Restraint for Non-Violent/Non-Self-Destructive Behavior  Goal: Prevent/Manage Potential Problems  Description: Maintain safety of patient and others during period of restraint.  Promote psychological and physical wellbeing.  Prevent injury to skin and involved body parts.  Promote nutrition, hydration, and elimination.  Outcome: No Change  Patient's vital signs were stable. No signs and symptoms of pain noted. Bilateral mitts in place. Patient repositioned every two hours. Condom cath intact. All due cares and medications given.

## 2021-08-06 NOTE — PLAN OF CARE
Problem: Device-Related Complication Risk (Artificial Airway)  Goal: Optimal Device Function  Outcome: Improving  Intervention: Optimize Device Care and Function  Recent Flowsheet Documentation  Taken 8/6/2021 0015 by Kayla Small RT  Airway Safety Measures:   all equipment/monitors on and audible   manual resuscitator/mask/valve in room   suction at bedside   suction regulator   suction equipment   oxygen flowmeter  Taken 8/5/2021 2019 by Kayla Small RT  Airway Safety Measures:   all equipment/monitors on and audible   manual resuscitator/mask/valve in room   suction at bedside   suction regulator   suction equipment   oxygen flowmeter     Problem: Skin and Tissue Injury (Artificial Airway)  Goal: Absence of Device-Related Skin or Tissue Injury  Outcome: Improving     Problem: Device-Related Complication Risk (Mechanical Ventilation, Invasive)  Goal: Optimal Device Function  Intervention: Optimize Device Care and Function  Recent Flowsheet Documentation  Taken 8/6/2021 0015 by Kayla Small RT  Airway Safety Measures:   all equipment/monitors on and audible   manual resuscitator/mask/valve in room   suction at bedside   suction regulator   suction equipment   oxygen flowmeter  Taken 8/5/2021 2019 by Kayla Small RT  Airway Safety Measures:   all equipment/monitors on and audible   manual resuscitator/mask/valve in room   suction at bedside   suction regulator   suction equipment   oxygen flowmeter

## 2021-08-06 NOTE — PROGRESS NOTES
CLINICAL NUTRITION SERVICES - REASSESSMENT NOTE      Recommendations Ordered by Registered Dietitian (RD):   Continue current TF regimen as ordered  Monitor for weight changes  Monitor for loose stools and/or constipation   Malnutrition:   % Weight Loss:  > 7.5% in 3 months (severe malnutrition)  % Intake:  No decreased intake noted  Subcutaneous Fat Loss:  Orbital region moderate-severe depletion and Upper arm region severe depletion  Muscle Loss:  Temporal region severe depletion, Clavicle bone region severe depletion, Acromion bone region severe depletion, Anterior thigh region severe depletion and Posterior calf region severe depletion  Fluid Retention:  None noted    Malnutrition Diagnosis: Severe malnutrition  In Context of:  Acute illness or injury     EVALUATION OF PROGRESS TOWARD GOALS   Diet:  NPO    Nutrition Support:    Adult Formula Vital 1.5   Route Gastrostomy   Goal Rate 90   Goal Units mL/hr     Intake/Tolerance:  TF at goal rate    ASSESSED NUTRITION NEEDS:  Dosing Weight: 67 kg  Wt Readings from Last 10 Encounters:   08/06/21 67 kg (147 lb 12.8 oz)   06/11/21 68.7 kg (151 lb 7.3 oz)   05/15/21 74 kg (163 lb 2.3 oz)   09/23/13 76 kg (167 lb 8 oz)   06/05/13 78.1 kg (172 lb 3.2 oz)   12/28/07 78.9 kg (174 lb)   12/05/07 81.9 kg (180 lb 9.6 oz)   10/10/07 85.6 kg (188 lb 11.2 oz)   09/19/07 84.8 kg (187 lb)   9.8% weight loss in 3 months  2.6% weight loss since admit 2 months ago    NEW FINDINGS:   - Pt. Still on trach and meeting nutritional needs via TF  - Weight stable  - Last BM recorded 8/3 soft  - Mg high 2.5 mg/dL  - Ca trending high 10.6 mg/dL, current intake 2800 mg/day    Labs:  Electrolytes  Potassium (mmol/L)   Date Value   08/06/2021 4.7   08/04/2021 4.2   08/02/2021 4.6   06/11/2021 3.8   06/10/2021 3.7   06/09/2021 3.8     Phosphorus (mg/dL)   Date Value   06/11/2021 2.9   06/10/2021 2.5   06/09/2021 2.5   06/08/2021 2.3 (L)   06/07/2021 2.9    Blood Glucose  Glucose (mg/dL)   Date  Value   08/06/2021 111   08/04/2021 114   08/02/2021 123   07/30/2021 122   07/28/2021 148 (H)   06/11/2021 142 (H)   06/10/2021 134 (H)   06/09/2021 117 (H)   06/08/2021 125 (H)   06/07/2021 103 (H)     Hemoglobin A1C (%)   Date Value   05/15/2021 5.5    Inflammatory Markers  CRP Inflammation (mg/L)   Date Value   06/01/2021 79.0 (H)   05/30/2021 120.0 (H)   05/28/2021 150.0 (H)     WBC (10e9/L)   Date Value   06/11/2021 8.2   06/10/2021 9.4   06/09/2021 10.4     WBC Count (10e3/uL)   Date Value   08/04/2021 9.3   08/02/2021 8.0   07/30/2021 6.7     Albumin (g/dL)   Date Value   06/14/2021 2.1 (L)   06/02/2021 1.7 (L)   05/26/2021 2.1 (L)   05/23/2021 1.9 (L)      Magnesium (mg/dL)   Date Value   06/11/2021 2.5 (H)   06/10/2021 2.4 (H)   06/09/2021 2.4 (H)     Sodium (mmol/L)   Date Value   08/06/2021 139   08/04/2021 141   08/02/2021 139   06/11/2021 143   06/10/2021 142   06/09/2021 143    Renal  Urea Nitrogen (mg/dL)   Date Value   08/06/2021 37 (H)   08/04/2021 34 (H)   08/02/2021 34 (H)   06/11/2021 25   06/10/2021 23   06/09/2021 24     Creatinine (mg/dL)   Date Value   08/06/2021 0.78   08/04/2021 0.82   08/02/2021 0.81   06/11/2021 0.57 (L)   06/10/2021 0.52 (L)   06/09/2021 0.49 (L)     Additional  Triglycerides (mg/dL)   Date Value   05/28/2021 91   06/05/2013 178 (H)   09/19/2007 109     Ketones Urine   Date Value   07/01/2021 Negative   06/01/2021 Negative mg/dL            Previous Goals:   Bowel function WDL  Evaluation: Met     Maintain weight while hospitalized   Evaluation: Met    Previous Nutrition Diagnosis:   Predicted suboptimal nutrient intake related to swallowing difficulty and prolonged hospitalization as evidenced by severe muscle loss, mild fat loss.  Evaluation: No change    CURRENT NUTRITION DIAGNOSIS  Predicted suboptimal nutrient intake related to swallowing difficulty and prolonged hospitalization as evidenced by severe muscle loss, mild fat loss.    INTERVENTIONS  Recommendations /  Nutrition Prescription  Continue current TF regimen as ordered  Monitor for weight changes  Monitor for loose stools and/or constipation    Implementation  EN Composition, EN Schedule and Feeding Tube Flush  Ordered Vitamin D lab draw    Goals  Maintain current weight 67 kg +/- 1-2 kg  <2 loose BM's per day    MONITORING AND EVALUATION:  Progress towards goals will be monitored and evaluated per protocol and Practice Guidelines    Dulce Gonzales RDN, LD  Clinical Dietitian

## 2021-08-06 NOTE — PLAN OF CARE
Problem: Mechanical Ventilation  Goal: Patient will maintain patent airway  Outcome: Progressing  Goal: Tracheostomy will be managed safely  Outcome: Progressing    RT PROGRESS NOTE     DATA:     CURRENT SETTINGS:               TRACH TYPE / SIZE: #6 Bivona, placed 7/28/2021             MODE:  HF 30L TM   FIO2:  30%     ACTION:             THERAPIES: Duo neb x QID via Metaneb, Saline neb BID given on scheduled time             SUCTION:                           FREQUENCY: 5                        AMOUNT: copious x 2/ large                        CONSISTENCY: normal                         COLOR:   white             SPONTANEOUS COUGH EFFORT/STRENGTH OF EFFORT (not elicited by suctioning): strong                               WEANING PHASE:   2                        WEAN MODE: 30% HF 30L TM/ PMV                        WEAN TIME: TM cont as tolerated, PMV on since 0830                        END WEAN REASON:      RESPONSE:             BS:   coarse to slightly coarse with some rhonchi on R side  - clear decreased on R side, clear on L side after Sx              VITAL SIGNS: O2 sat 91-94%, HR 84-94, RR 22-24             EMOTIONAL NEEDS / CONCERNS:  None               RISK FOR SELF DECANNULATION:  Yes                        RISK DUE TO: Confusion                        INTERVENTION/S IN PLACE IS/ARE: Restraints x2       NOTE / PLAN:  PMV on since 0830. Continue current plan of care - TM with PMV during the day, PMV off for night, cuff up.

## 2021-08-06 NOTE — PLAN OF CARE
Pt continues to flail arms up in air, has not made any attempts to touch trach when he is observed. Staff continue to strategize how to protect trach from his impulsive arm movements up in the air without needing mitt restraints. Pt has denied experiencing pain. Cooperative with therapies and cares given.  Problem: Adult Inpatient Plan of Care  Goal: Plan of Care Review  Outcome: Improving  Goal: Patient-Specific Goal (Individualized)  Outcome: Improving  Goal: Absence of Hospital-Acquired Illness or Injury  Outcome: Improving  Goal: Optimal Comfort and Wellbeing  Outcome: Improving  Goal: Readiness for Transition of Care  Outcome: Improving     Problem: OT General Care Plan  Goal: Transfer (OT)  Description: Transfer (OT)  Outcome: Improving  Goal: Cognitive (OT)  Description: Cognitive (OT)  Outcome: Improving     Problem: Anxiety  Goal: Anxiety Reduction or Resolution  Outcome: Improving     Problem: Anemia  Goal: Anemia Symptom Improvement  Outcome: Improving     Problem: Patient Goal: Social Work Focus  Goal: 1. Patient Goal: Care Coordination / Social Work  Outcome: Improving  Goal: 2. Patient Goal: Care Coordination / Social Work Focus  Outcome: Improving  Goal: 3. Patient Goal: Care Coordination / Social Work Focus  Outcome: Improving     Problem: Discharge Planning  Goal: Discharge Planning (Adult, OB, Behavioral, Peds)  Outcome: Improving     Problem: Restraint for Non-Violent/Non-Self-Destructive Behavior  Goal: Prevent/Manage Potential Problems  Description: Maintain safety of patient and others during period of restraint.  Promote psychological and physical wellbeing.  Prevent injury to skin and involved body parts.  Promote nutrition, hydration, and elimination.  Outcome: Improving     Problem: Device-Related Complication Risk (Artificial Airway)  Goal: Optimal Device Function  Outcome: Improving     Problem: Skin and Tissue Injury (Artificial Airway)  Goal: Absence of Device-Related Skin or Tissue  Injury  Outcome: Improving

## 2021-08-06 NOTE — PROGRESS NOTES
RT PROGRESS NOTE    DATA  TRACH TYPE -- Bivona #6 Cuffed 7/28    ACTION  THERAPIES -- BID Mucomyst, Duoneb, NaCl 3%  SUCTION   FREQUENCY -- Q30 Min to Q1 Hour    AMOUNT -- moderate    CONSISTENCY -- thick    COLOR -- white/cloudy    SPONTANEOUS COUGH -- good   WEAN PHASE #2   MODE -- HTD 30 LPM 30%   DURATION -- 24/7   END REASON -- N/A    RESPONSE  BREATH SOUNDS -- Clear to Coarse   VITAL SIGNS --  Temp:  [98.2  F (36.8  C)-98.6  F (37  C)] 98.6  F (37  C)  Pulse:  [80-95] 88  Resp:  [17-18] 18  BP: (115-138)/(69-78) 115/69  FiO2 (%):  [30 %] 30 %  SpO2:  [91 %-99 %] 97 %   EMOTIONAL CONCERNS -- None   RISK FOR SELF-DECANNULATION -- Yes; Mitt Restraints in Place     NOTE   No signs of respiratory distress. RT will continue to monitor.     Kayla Small, RT on 8/6/2021 at 12:01 AM

## 2021-08-07 ENCOUNTER — APPOINTMENT (OUTPATIENT)
Dept: OCCUPATIONAL THERAPY | Facility: CLINIC | Age: 71
DRG: 207 | End: 2021-08-07
Attending: HOSPITALIST
Payer: COMMERCIAL

## 2021-08-07 PROCEDURE — 250N000009 HC RX 250: Performed by: INTERNAL MEDICINE

## 2021-08-07 PROCEDURE — 120N000017 HC R&B RESPIRATORY CARE

## 2021-08-07 PROCEDURE — 999N000123 HC STATISTIC OXYGEN O2DAILY TECH TIME

## 2021-08-07 PROCEDURE — 94640 AIRWAY INHALATION TREATMENT: CPT

## 2021-08-07 PROCEDURE — 97129 THER IVNTJ 1ST 15 MIN: CPT | Mod: GO | Performed by: OCCUPATIONAL THERAPIST

## 2021-08-07 PROCEDURE — 94668 MNPJ CHEST WALL SBSQ: CPT

## 2021-08-07 PROCEDURE — 250N000011 HC RX IP 250 OP 636

## 2021-08-07 PROCEDURE — 94660 CPAP INITIATION&MGMT: CPT

## 2021-08-07 PROCEDURE — 97110 THERAPEUTIC EXERCISES: CPT | Mod: GO | Performed by: OCCUPATIONAL THERAPIST

## 2021-08-07 PROCEDURE — 250N000013 HC RX MED GY IP 250 OP 250 PS 637: Performed by: HOSPITALIST

## 2021-08-07 PROCEDURE — 94640 AIRWAY INHALATION TREATMENT: CPT | Mod: 76

## 2021-08-07 PROCEDURE — 250N000013 HC RX MED GY IP 250 OP 250 PS 637

## 2021-08-07 PROCEDURE — 97530 THERAPEUTIC ACTIVITIES: CPT | Mod: GO | Performed by: OCCUPATIONAL THERAPIST

## 2021-08-07 PROCEDURE — 999N000157 HC STATISTIC RCP TIME EA 10 MIN

## 2021-08-07 PROCEDURE — 250N000013 HC RX MED GY IP 250 OP 250 PS 637: Performed by: INTERNAL MEDICINE

## 2021-08-07 PROCEDURE — 999N000009 HC STATISTIC AIRWAY CARE

## 2021-08-07 RX ORDER — LOPERAMIDE HYDROCHLORIDE 1 MG/5ML
1 SOLUTION ORAL 3 TIMES DAILY PRN
Status: DISCONTINUED | OUTPATIENT
Start: 2021-08-07 | End: 2021-08-30

## 2021-08-07 RX ADMIN — ACETYLCYSTEINE 2 ML: 200 SOLUTION ORAL; RESPIRATORY (INHALATION) at 19:25

## 2021-08-07 RX ADMIN — Medication 3 PACKET: at 08:08

## 2021-08-07 RX ADMIN — ANORECTAL OINTMENT: 15.7; .44; 24; 20.6 OINTMENT TOPICAL at 13:53

## 2021-08-07 RX ADMIN — HEPARIN SODIUM 5000 UNITS: 5000 INJECTION, SOLUTION INTRAVENOUS; SUBCUTANEOUS at 13:52

## 2021-08-07 RX ADMIN — VENLAFAXINE 37.5 MG: 37.5 TABLET ORAL at 11:30

## 2021-08-07 RX ADMIN — SCOPALAMINE 1 PATCH: 1 PATCH, EXTENDED RELEASE TRANSDERMAL at 13:51

## 2021-08-07 RX ADMIN — VENLAFAXINE 37.5 MG: 37.5 TABLET ORAL at 08:08

## 2021-08-07 RX ADMIN — MIRTAZAPINE 7.5 MG: 7.5 TABLET ORAL at 21:55

## 2021-08-07 RX ADMIN — ANORECTAL OINTMENT: 15.7; .44; 24; 20.6 OINTMENT TOPICAL at 08:08

## 2021-08-07 RX ADMIN — HEPARIN SODIUM 5000 UNITS: 5000 INJECTION, SOLUTION INTRAVENOUS; SUBCUTANEOUS at 05:28

## 2021-08-07 RX ADMIN — SODIUM CHLORIDE 30 MG/ML INHALATION SOLUTION 3 ML: 30 SOLUTION INHALANT at 19:25

## 2021-08-07 RX ADMIN — METHYLPHENIDATE HYDROCHLORIDE 10 MG: 10 TABLET ORAL at 05:28

## 2021-08-07 RX ADMIN — SODIUM CHLORIDE 30 MG/ML INHALATION SOLUTION 3 ML: 30 SOLUTION INHALANT at 07:42

## 2021-08-07 RX ADMIN — Medication 3 PACKET: at 14:03

## 2021-08-07 RX ADMIN — FAMOTIDINE 20 MG: 20 TABLET, FILM COATED ORAL at 21:55

## 2021-08-07 RX ADMIN — Medication 3 PACKET: at 21:55

## 2021-08-07 RX ADMIN — ACETYLCYSTEINE 2 ML: 200 SOLUTION ORAL; RESPIRATORY (INHALATION) at 07:41

## 2021-08-07 RX ADMIN — HEPARIN SODIUM 5000 UNITS: 5000 INJECTION, SOLUTION INTRAVENOUS; SUBCUTANEOUS at 21:55

## 2021-08-07 RX ADMIN — ANORECTAL OINTMENT: 15.7; .44; 24; 20.6 OINTMENT TOPICAL at 21:56

## 2021-08-07 RX ADMIN — IPRATROPIUM BROMIDE AND ALBUTEROL SULFATE 3 ML: 2.5; .5 SOLUTION RESPIRATORY (INHALATION) at 07:41

## 2021-08-07 RX ADMIN — LISINOPRIL 40 MG: 20 TABLET ORAL at 18:00

## 2021-08-07 RX ADMIN — VENLAFAXINE 37.5 MG: 37.5 TABLET ORAL at 17:56

## 2021-08-07 RX ADMIN — IPRATROPIUM BROMIDE AND ALBUTEROL SULFATE 3 ML: 2.5; .5 SOLUTION RESPIRATORY (INHALATION) at 19:24

## 2021-08-07 RX ADMIN — FAMOTIDINE 20 MG: 20 TABLET, FILM COATED ORAL at 08:07

## 2021-08-07 ASSESSMENT — MIFFLIN-ST. JEOR: SCORE: 1447.77

## 2021-08-07 NOTE — PLAN OF CARE
Problem: Adult Inpatient Plan of Care  Goal: Absence of Hospital-Acquired Illness or Injury  Outcome: Improving  Intervention: Identify and Manage Fall Risk  Recent Flowsheet Documentation  Taken 8/6/2021 1639 by Amanda Jean RN  Safety Promotion/Fall Prevention: bed alarm on   Patient remained safe. Restraints continued. No complain of pain.

## 2021-08-07 NOTE — PLAN OF CARE
Problem: Anxiety  Goal: Anxiety Reduction or Resolution  Outcome: Improving     Problem: Skin and Tissue Injury (Artificial Airway)  Goal: Absence of Device-Related Skin or Tissue Injury  Outcome: Improving   Vital signs were stable. Pt. does not appear to be in pain. Pt. slept most of the shift. Pt. was repositioned every 2 hours. Continue to monitor.  Margot Sargent RN

## 2021-08-07 NOTE — PLAN OF CARE
Problem: Mechanical Ventilation  Goal: Patient will maintain patent airway  Outcome: Progressing  Goal: Tracheostomy will be managed safely  Outcome: Progressing     RT PROGRESS NOTE     DATA:     CURRENT SETTINGS:               TRACH TYPE / SIZE: #6 Bivona, placed 7/28             MODE:  HFTM             FIO2:  30%, 30L     ACTION:             THERAPIES: Duo neb x QID, Metaneb, Saline neb BID              SUCTION:                           FREQUENCY: 6                        AMOUNT: small to copious                         CONSISTENCY:thick                        COLOR:   white             SPONTANEOUS COUGH EFFORT/STRENGTH OF EFFORT (not elicited by suctioning): strong cough                              WEANING PHASE:   2                        WEAN MODE: TM + PMV                        WEAN TIME: PMV for 11hrs 30min                        END WEAN REASON: for noc     RESPONSE:             BS:   coarse, diminished               VITAL SIGNS: O2 sat 91-95%, HR 83, RR 20-22             EMOTIONAL NEEDS / CONCERNS:  None               RISK FOR SELF DECANNULATION:  Yes                        RISK DUE TO: Confusion                        INTERVENTION/S IN PLACE IS/ARE: Restraints x2       NOTE / PLAN: Pt tolerated PMV well. No respiratory distress noted. Continue current POC

## 2021-08-07 NOTE — PROGRESS NOTES
[facility]    Medicine Progress Note - Hospitalist Service    Identifier:  70-year-old retired man who lived with his wife at home prehospitalization, who originally presented to [facility] on May 15, 2021 for altered mental state. He had sustained a fall and was found to be unresponsive by his wife.  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  He was treated with milrinone and verapamil.  He was successfully extubated on Pamela 3 and on June 6 he was reintubated due to drop in his saturations and underwent tracheostomy placement as well as feeding tube placement on June 7.  June 9: EVD was removed.  He was transferred to LTAC on June 11  In the last 1 month he has not been able to be liberated from the ventilator.  He has had repeated bouts of Klebsiella infection in sputum and ventilator associated pneumonia.  Additionally he has had a large amount of tracheal secretions, significant cognitive impairment, left-sided neglect and ongoing episodes of agitation.  Patient's family has been monitoring the patient continuously during the daytime hours via video monitor and visit with him in person over the weekends.  Multiple specialties have been involved in the care of this patient including palliative care, infectious disease pulmonary medicine and neurology      Date of Admission:  6/11/2021    Assessment & Plan                               1/.  Acute hypoxemic respiratory failure secondary to prolonged hospitalization, significant intracranial hemorrhage.  - excessive thick white secretions is the main barrier to decannulation   - glycopyrolate was  d/thang 2 weeks back per pulmonary medicine  -Continues to be on a scopolamine patch  - RT reporting small to copious secretions requiring suctioning up to 6 times per shift   - Consider sputum culture if increased or yellow secretions   - Tolerating passy catie valve up to 11 hours during the day     2/.  Insomnia: Ritalin twice a day, Ritalin at bedtime was probably making insomnia worsen. Bedtime Ritalin stopped 8/3/21 and continues dose of once daily in the morning.  - Started with nighttime mirtazapine 7.5 mg scheduled. Can be uptitrated as appropriate.     3/.  Traumatic brain injury/intracranial hemorrhage: Effexor was started about 2 weeks ago at 25 mg 3 times a day, uptitrated today to 37.5 mg 3 times a day, family informed    4/.  Dysphagia: Secondary to respiratory failure continue with tube feeds at this time.  Even though the patient passed a blue dye test about a week ago but he still has a lot of secretions    5/.  Ventilator associated pneumonia appreciate intervention and recommendations by infectious disease antibiotics to continued till 7/18. Off all Abx now. Repeat sputum culture if yellow or increased secretions.     6/.  Prognosis: This is challenge because patient's wife is having a difficult time with the situation and recognizing that the patient is unlikely going to be his prehospitalization self.     7/.  Restraints: We have been using hand mitts and that seems to be eliminating the need for a sitter. Order renewed today. Spoke to nurse who will observe patient with mitts off and see if he pulls at tubes. Consider abdominal binder to protect G-tube from being pulled.     Diet: Adult Formula Drip Feeding: Continuous Vital 1.5; Gastrostomy; Goal Rate: 90; mL/hr; Medication - Feeding Tube Flush Frequency: At least 15-30 mL water before and after medication administration and with tube clogging; Amount to Send (Nutrition us...    DVT Prophylaxis: Heparin SQ  Gutierrez Catheter: Not present  Central  "Lines: None  Code Status: Full Code      Disposition Plan   Expected discharge: Plan for SNF, date to be determined      Total floor/unit time spent was 25 minutes in reviewing the record, medications, lab results and completing documentation. 15 minutes spent in counseling and discussion with patient regarding diagnosis, medications and treatment plan. Care discussed and coordinated with patient and nurse.     SIS[facility]  Hospitalist Service  [facility]  Securely message with the Vocera Web Console (learn more here)  Text page via [facility] Paging/Directory                 ______________________________________________________________________    Interval History   No new concerns per nursing today. RT note reporting small to copious thick white secretions with suctioning x 6.  Fidgety at times and is wearing bilateral hand mitts for safety. Patient is dozing in bed, spontaneous eye opening, makes eye contact. Patient mouths and whispers words, he mouths \"good morning.\" I asked him if he is in pain and he mouths, \"no.\" Per nursing no fevers, labored breathing, pain, or vomiting. Occasional loose stool due to tube feeding.      Review of Systems: 10 point review of systems negative except for pertinent positives mentioned in HPI     Data reviewed today: I reviewed all medications, new labs and imaging results over the last 24 hours. No images or EKG's today.     Physical Exam   Vital Signs: Temp: 98.9  F (37.2  C) Temp src: Axillary BP: 100/65 Pulse: 93   Resp: 24 SpO2: 96 % O2 Device: Trach dome Oxygen Delivery: 30 LPM  Weight: 144 lbs 9.6 oz  General Appearance:  Alert, calm, no apparent distress   Respiratory: coarse anterior breath sounds, no wheezing or crackles, tracheostomy patent with no visible secretions, surrounding skin around trach dry/intact  Cardiovascular: S1,S2, rhythm rate regular, negative murmur   GI: Bowel sounds positive x 4, non distended non tender, G/J tube appears patent with tube " feeding infusing, surrounding skin dry/intact  Skin: face slightly flushed, pink, warm, dry and intact, no rash or open sore  Neurological: alert and oriented x 1, nonverbal due to trach, whispers/mouths words, moves upper extremities, able to lift right arm all the way up, moving left arm up but weaker compared to right with chronic left sided neglet, inconsistent following commands        Data   Medications     dextrose       sodium chloride         acetylcysteine  2 mL Nebulization BID     famotidine  20 mg Oral or Feeding Tube BID     fiber modular (NUTRISOURCE FIBER)  3 packet Per Feeding Tube TID     heparin ANTICOAGULANT  5,000 Units Subcutaneous Q8H     ipratropium - albuterol 0.5 mg/2.5 mg/3 mL  3 mL Nebulization BID     lisinopril  40 mg Oral or Feeding Tube Daily     menthol-zinc oxide   Topical TID     methylphenidate  10 mg Oral or Feeding Tube QAM AC     mirtazapine  7.5 mg Per Feeding Tube At Bedtime     scopolamine  1 patch Transdermal Q72H    And     scopolamine   Transdermal Q8H     sodium chloride  3 mL Nebulization BID     venlafaxine  37.5 mg Per Feeding Tube TID w/meals

## 2021-08-07 NOTE — PLAN OF CARE
Problem: Restraint for Non-Violent/Non-Self-Destructive Behavior  Goal: Prevent/Manage Potential Problems  Description: Maintain safety of patient and others during period of restraint.  Promote psychological and physical wellbeing.  Prevent injury to skin and involved body parts.  Promote nutrition, hydration, and elimination.  Outcome: No Change   Pt still on mitts x 2 for safety. Still grabs on trach tubing when mitts are off. Participated in therapy, Physical therapy took him to the gym. Daughter & wife was visiting. Resting in bed at this time.

## 2021-08-08 PROCEDURE — 999N000123 HC STATISTIC OXYGEN O2DAILY TECH TIME

## 2021-08-08 PROCEDURE — 94640 AIRWAY INHALATION TREATMENT: CPT

## 2021-08-08 PROCEDURE — 94640 AIRWAY INHALATION TREATMENT: CPT | Mod: 76

## 2021-08-08 PROCEDURE — 250N000009 HC RX 250: Performed by: INTERNAL MEDICINE

## 2021-08-08 PROCEDURE — 250N000013 HC RX MED GY IP 250 OP 250 PS 637: Performed by: INTERNAL MEDICINE

## 2021-08-08 PROCEDURE — 250N000013 HC RX MED GY IP 250 OP 250 PS 637

## 2021-08-08 PROCEDURE — 94660 CPAP INITIATION&MGMT: CPT

## 2021-08-08 PROCEDURE — 250N000011 HC RX IP 250 OP 636

## 2021-08-08 PROCEDURE — 120N000017 HC R&B RESPIRATORY CARE

## 2021-08-08 PROCEDURE — 999N000157 HC STATISTIC RCP TIME EA 10 MIN

## 2021-08-08 PROCEDURE — 250N000013 HC RX MED GY IP 250 OP 250 PS 637: Performed by: HOSPITALIST

## 2021-08-08 PROCEDURE — 999N000009 HC STATISTIC AIRWAY CARE

## 2021-08-08 PROCEDURE — 94668 MNPJ CHEST WALL SBSQ: CPT

## 2021-08-08 RX ORDER — TRIAMCINOLONE ACETONIDE 5 MG/G
OINTMENT TOPICAL 2 TIMES DAILY
Status: COMPLETED | OUTPATIENT
Start: 2021-08-08 | End: 2021-08-11

## 2021-08-08 RX ORDER — BENZOCAINE/MENTHOL 6 MG-10 MG
LOZENGE MUCOUS MEMBRANE 2 TIMES DAILY
Status: DISCONTINUED | OUTPATIENT
Start: 2021-08-08 | End: 2021-08-08 | Stop reason: ALTCHOICE

## 2021-08-08 RX ADMIN — IPRATROPIUM BROMIDE AND ALBUTEROL SULFATE 3 ML: 2.5; .5 SOLUTION RESPIRATORY (INHALATION) at 07:31

## 2021-08-08 RX ADMIN — ACETYLCYSTEINE 2 ML: 200 SOLUTION ORAL; RESPIRATORY (INHALATION) at 07:31

## 2021-08-08 RX ADMIN — IPRATROPIUM BROMIDE AND ALBUTEROL SULFATE 3 ML: 2.5; .5 SOLUTION RESPIRATORY (INHALATION) at 19:21

## 2021-08-08 RX ADMIN — LISINOPRIL 40 MG: 20 TABLET ORAL at 18:37

## 2021-08-08 RX ADMIN — ACETAMINOPHEN 650 MG: 325 TABLET ORAL at 15:50

## 2021-08-08 RX ADMIN — VENLAFAXINE 37.5 MG: 37.5 TABLET ORAL at 08:08

## 2021-08-08 RX ADMIN — SODIUM CHLORIDE 30 MG/ML INHALATION SOLUTION 3 ML: 30 SOLUTION INHALANT at 07:47

## 2021-08-08 RX ADMIN — METHYLPHENIDATE HYDROCHLORIDE 10 MG: 10 TABLET ORAL at 05:52

## 2021-08-08 RX ADMIN — Medication 3 PACKET: at 18:37

## 2021-08-08 RX ADMIN — ACETYLCYSTEINE 2 ML: 200 SOLUTION ORAL; RESPIRATORY (INHALATION) at 19:21

## 2021-08-08 RX ADMIN — ANORECTAL OINTMENT: 15.7; .44; 24; 20.6 OINTMENT TOPICAL at 20:28

## 2021-08-08 RX ADMIN — MIRTAZAPINE 7.5 MG: 7.5 TABLET ORAL at 20:09

## 2021-08-08 RX ADMIN — HEPARIN SODIUM 5000 UNITS: 5000 INJECTION, SOLUTION INTRAVENOUS; SUBCUTANEOUS at 05:52

## 2021-08-08 RX ADMIN — TRIAMCINOLONE ACETONIDE: 5 OINTMENT TOPICAL at 20:09

## 2021-08-08 RX ADMIN — FAMOTIDINE 20 MG: 20 TABLET, FILM COATED ORAL at 20:09

## 2021-08-08 RX ADMIN — ANORECTAL OINTMENT: 15.7; .44; 24; 20.6 OINTMENT TOPICAL at 14:01

## 2021-08-08 RX ADMIN — HEPARIN SODIUM 5000 UNITS: 5000 INJECTION, SOLUTION INTRAVENOUS; SUBCUTANEOUS at 21:33

## 2021-08-08 RX ADMIN — Medication 3 PACKET: at 20:12

## 2021-08-08 RX ADMIN — HEPARIN SODIUM 5000 UNITS: 5000 INJECTION, SOLUTION INTRAVENOUS; SUBCUTANEOUS at 13:59

## 2021-08-08 RX ADMIN — FAMOTIDINE 20 MG: 20 TABLET, FILM COATED ORAL at 08:09

## 2021-08-08 RX ADMIN — Medication 3 PACKET: at 08:08

## 2021-08-08 RX ADMIN — ANORECTAL OINTMENT: 15.7; .44; 24; 20.6 OINTMENT TOPICAL at 08:09

## 2021-08-08 RX ADMIN — VENLAFAXINE 37.5 MG: 37.5 TABLET ORAL at 18:38

## 2021-08-08 RX ADMIN — SODIUM CHLORIDE 30 MG/ML INHALATION SOLUTION 3 ML: 30 SOLUTION INHALANT at 19:21

## 2021-08-08 RX ADMIN — VENLAFAXINE 37.5 MG: 37.5 TABLET ORAL at 14:00

## 2021-08-08 ASSESSMENT — MIFFLIN-ST. JEOR: SCORE: 1480.88

## 2021-08-08 NOTE — PLAN OF CARE
Problem: Anxiety  Goal: Anxiety Reduction or Resolution  Outcome: No Change  Intervention: Promote Anxiety Reduction  Recent Flowsheet Documentation  Taken 8/8/2021 0200 by Marni Lama RN  Complementary Therapy: massage therapy performed     Problem: Restraint for Non-Violent/Non-Self-Destructive Behavior  Goal: Prevent/Manage Potential Problems  Description: Maintain safety of patient and others during period of restraint.  Promote psychological and physical wellbeing.  Prevent injury to skin and involved body parts.  Promote nutrition, hydration, and elimination.  Outcome: No Change     Problem: Adult Inpatient Plan of Care  Goal: Absence of Hospital-Acquired Illness or Injury  Intervention: Identify and Manage Fall Risk  Recent Flowsheet Documentation  Taken 8/8/2021 0200 by Marni Lama RN  Safety Promotion/Fall Prevention: bed alarm on  Intervention: Prevent Skin Injury  Recent Flowsheet Documentation  Taken 8/8/2021 0647 by Marni Lama RN  Body Position:   right   turned  Taken 8/8/2021 0400 by Marni Lama RN  Body Position:   turned   left  Intervention: Prevent Infection  Recent Flowsheet Documentation  Taken 8/8/2021 0200 by Marni Lama RN  Infection Prevention: hand hygiene promoted     Problem: Device-Related Complication Risk (Mechanical Ventilation, Invasive)  Goal: Optimal Device Function  Intervention: Optimize Device Care and Function  Recent Flowsheet Documentation  Taken 8/8/2021 0200 by Marni Lama RN  Airway Safety Measures: all equipment/monitors on and audible     Problem: Ventilator-Induced Lung Injury (Mechanical Ventilation, Invasive)  Goal: Absence of Ventilator-Induced Lung Injury  Intervention: Prevent Ventilator-Associated Pneumonia  Recent Flowsheet Documentation  Taken 8/8/2021 0647 by Marni Lama RN  Head of Bed (HOB) Positioning: HOB at 30 degrees  Taken 8/8/2021 0400 by Marni Lama RN  Head of Bed (HOB) Positioning: HOB at 30  degrees     Problem: Device-Related Complication Risk (Artificial Airway)  Goal: Optimal Device Function  Intervention: Optimize Device Care and Function  Recent Flowsheet Documentation  Taken 8/8/2021 0200 by Marni Lama RN  Airway Safety Measures: all equipment/monitors on and audible     Problem: Adult Inpatient Plan of Care  Goal: Absence of Hospital-Acquired Illness or Injury  Intervention: Prevent Skin Injury  Recent Flowsheet Documentation  Taken 8/8/2021 0647 by Marni Lama RN  Body Position:   right   turned  Taken 8/8/2021 0400 by Marni Lama RN  Body Position:   turned   left     Problem: Adult Inpatient Plan of Care  Goal: Absence of Hospital-Acquired Illness or Injury  Intervention: Prevent Infection  Recent Flowsheet Documentation  Taken 8/8/2021 0200 by Marni Lama RN  Infection Prevention: hand hygiene promoted  Pt remains in restraints for pt safety. Tolerated repositioning every two hrs. Slept intermittently. Appears to have no pain. Sx several times for copious amount of secretions.

## 2021-08-08 NOTE — PLAN OF CARE
"  Problem: Device-Related Complication Risk (Artificial Airway)  Goal: Optimal Device Function  Outcome: No Change     Problem: Skin and Tissue Injury (Artificial Airway)  Goal: Absence of Device-Related Skin or Tissue Injury  Outcome: No Change   RT PROGRESS NOTE     DATA:     CURRENT SETTINGS:             TRACH TYPE / SIZE:  #6 bivona changed 7/28             MODE:  titrated to 21% and 10L tm                  ACTION:             THERAPIES:   duo neb bid, mucomyst bid, saline bid, metaneb bid             SUCTION:                           FREQUENCY:   x4                        AMOUNT:   scant to large                        CONSISTENCY:   thick                        COLOR:   pale yellow to white             SPONTANEOUS COUGH EFFORT/STRENGTH OF EFFORT (not elicited by suctioning): moderate to strong                              WEANING PHASE:   2                        WEAN MODE:    pmv                        WEAN TIME:   started at 08:05                         RESPONSE:             BS:   Diminished and clear             VITAL SIGNS:   Blood pressure 129/86, pulse 95, temperature 98.6  F (37  C), temperature source Axillary, resp. rate 24, height 1.803 m (5' 10.98\"), weight 68.9 kg (151 lb 14.4 oz), SpO2 95 %.                 EMOTIONAL NEEDS / CONCERNS:  no                RISK FOR SELF DECANNULATION:  yes                        RISK DUE TO:  impulsive confusion                        INTERVENTION/S IN PLACE IS/ARE:  bilateral mitts       NOTE / PLAN:   Continue to encourage.  11 ml water required for minimal leak during metaneb this morning.  Pt noted to have hypergranulation tissue, RN notified to assess.    "

## 2021-08-08 NOTE — PLAN OF CARE
Problem: Adult Inpatient Plan of Care  Goal: Absence of Hospital-Acquired Illness or Injury  Intervention: Prevent Infection  Recent Flowsheet Documentation  Taken 8/7/2021 1642 by Amanda Jean RN  Infection Prevention: hand hygiene promoted   Patient was alert,calm, cooperative with cares and repositions. No complain of pain.

## 2021-08-08 NOTE — PLAN OF CARE
Problem: Restraint for Non-Violent/Non-Self-Destructive Behavior  Goal: Prevent/Manage Potential Problems  Description: Maintain safety of patient and others during period of restraint.  Promote psychological and physical wellbeing.  Prevent injury to skin and involved body parts.  Promote nutrition, hydration, and elimination.  Outcome: No Change   Still with mitts x2 for safety. Daughter & wife visiting. Prn suctioning done, small-moderate amount of secretions yellow/white, thick in consistency.   Problem: Adult Inpatient Plan of Care  Goal: Absence of Hospital-Acquired Illness or Injury  Intervention: Prevent Skin Injury  Recent Flowsheet Documentation  Taken 8/8/2021 1308 by Rosa M Odonnell, RN  Body Position: tilted  Taken 8/8/2021 0833 by Rosa M Odonnell, RN  Body Position:    left    turned   Noted granulation tissue 12 o'clock trach site was bleeding small in amount. VM left for WOC. Flaky, red rash noted on face. Note left for MD. Large loose BM x1 this shift. Incontinent of urine, condom cath on. He was up in the chair x 4 hrs. Prn tylenol given at 1550H for right knee pain. Effective. Resting bed at this time.

## 2021-08-08 NOTE — PLAN OF CARE
Problem: Device-Related Complication Risk (Artificial Airway)  Goal: Optimal Device Function  Outcome: Improving  Intervention: Optimize Device Care and Function  Recent Flowsheet Documentation  Taken 8/7/2021 2318 by Timoteo Tran RT  Airway Safety Measures:    all equipment/monitors on and audible    manual resuscitator at bedside    suction at bedside    suction equipment    oxygen flowmeter  Taken 8/7/2021 1925 by Timoteo Tran RT  Airway Safety Measures:    all equipment/monitors on and audible    manual resuscitator at bedside    suction at bedside    suction equipment    oxygen flowmeter     Problem: Device-Related Complication Risk (Artificial Airway)  Goal: Optimal Device Function  Intervention: Optimize Device Care and Function  Recent Flowsheet Documentation  Taken 8/7/2021 2318 by Timoteo Tran RT  Airway Safety Measures:    all equipment/monitors on and audible    manual resuscitator at bedside    suction at bedside    suction equipment    oxygen flowmeter  Taken 8/7/2021 1925 by Timoteo Tran RT  Airway Safety Measures:    all equipment/monitors on and audible    manual resuscitator at bedside    suction at bedside    suction equipment    oxygen flowmeter   RT PROGRESS NOTE     DATA:     CURRENT SETTINGS:             TRACH TYPE / SIZE: #6 Bivona (Cuff up, placed 07/28)             MODE:  TM             FIO2:   30%, 30 L     ACTION:             THERAPIES: Duo-neb BID/Mucomyst BID/Saline BID             SUCTION: Yes                         FREQUENCY: 8x                        AMOUNT:   Small x2 to Moderate x3 to Large x3                        CONSISTENCY: Thick (Lavage x1)                        COLOR:   Pale yellow             SPONTANEOUS COUGH EFFORT/STRENGTH OF EFFORT (not elicited by suctioning): Strong productive cough.                              WEANING PHASE:   2                        WEAN MODE:  TM with PMV                          WEAN TIME:   10 hours and 40 minutes                         END WEAN REASON: Cuff up at noc per order     RESPONSE:             BS:   Coarse             VITAL SIGNS: Sat 94-97%, HR 79-90, RR 18-22             EMOTIONAL NEEDS / CONCERNS:  None                RISK FOR SELF DECANNULATION:  Yes                        RISK DUE TO: Confusion                        INTERVENTION/S IN PLACE IS/ARE: Mitts x2       NOTE / PLAN:     Pt remains on TM and tolerating well with no distress.  Continue current care plan.

## 2021-08-08 NOTE — PROGRESS NOTES
[facility]    Medicine Progress Note - Hospitalist Service       Date of Admission:  6/11/2021    Identifier:  70-year-old retired man who lived with his wife at home prehospitalization, who originally presented to [facility] on May 15, 2021 for altered mental state. He had sustained a fall and was found to be unresponsive by his wife.  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  He was treated with milrinone and verapamil.  He was successfully extubated on Pamela 3 and on June 6 he was reintubated due to drop in his saturations and underwent tracheostomy placement as well as feeding tube placement on June 7.  June 9: EVD was removed.  He was transferred to LTAC on June 11  In the last 1 month he has not been able to be liberated from the ventilator.  He has had repeated bouts of Klebsiella infection in sputum and ventilator associated pneumonia.  Additionally he has had a large amount of tracheal secretions, significant cognitive impairment, left-sided neglect and ongoing episodes of agitation.  Patient's family has been monitoring the patient continuously during the daytime hours via video monitor and visit with him in person over the weekends.  Multiple specialties have been involved in the care of this patient including palliative care, infectious disease pulmonary medicine and neurology    Assessment & Plan                                          1/.  Acute hypoxemic respiratory failure secondary to prolonged hospitalization, significant intracranial hemorrhage.  - excessive thick white to pale yellow secretions is the main barrier to decannulation   -  glycopyrolate was d/thang 2 weeks back per pulmonary medicine  -Continues to be on a scopolamine patch  - RT reporting small to copious secretions requiring suctioning up to 6-8 times per shift   - Consider sputum culture if increased or yellow secretions   - Tolerating passy catie valve up to 11 hours during the day     2/.  Insomnia: Ritalin twice a day, Ritalin at bedtime was probably making insomnia worsen. Bedtime Ritalin stopped 8/3/21 and continues dose of once daily in the morning.  - Continue with nighttime mirtazapine 7.5 mg scheduled. Can be uptitrated as appropriate. Nurse reporting sleeping off/on at night     3/.  Traumatic brain injury/intracranial hemorrhage: Effexor was started about 2 weeks ago at 25 mg 3 times a day, uptitrated to 37.5 mg 3 times a day, family informed    4/.  Dysphagia: Secondary to respiratory failure continue with tube feeds at this time.  Even though the patient passed a blue dye test about a week ago but he still has a lot of secretions    5/.  Ventilator associated pneumonia appreciate intervention and recommendations by infectious disease antibiotics to continued till 7/18. Off all Abx now. Repeat sputum culture if yellow or increased secretions.     6./Hypercalcemia  - Ca+ level slightly elevated this last week 10.6-11. May be secondary to decreased mobility. Check ionized calcium tomorrow with bmp, cbc.     7/.  Restraints: We have been using hand mitts and that seems to be eliminating the need for a sitter. Order renewed today. Nursing continues to assess need for restraints daily. Patient still attempting to pull tubes with mitts off. Consider abdominal binder to protect G-tube from being pulled.     8/. Facial rash, mild folliculitis  - practice good hygiene, wash face daily with mild soap and water, moisturize 1-2 times daily   - consider low potency corticosteroid daily x 1 week. No signs of infection. No itching or pain     9/.  Prognosis: This is challenge because  "patient's wife is having a difficult time with the situation and recognizing that the patient is unlikely going to be his prehospitalization self.       Diet: Adult Formula Drip Feeding: Continuous Vital 1.5; Gastrostomy; Goal Rate: 90; mL/hr; Medication - Feeding Tube Flush Frequency: At least 15-30 mL water before and after medication administration and with tube clogging;    DVT Prophylaxis: Heparin SQ  Gutierrez Catheter: Not present  Central Lines: None  Code Status: Full Code      Disposition Plan   Expected discharge: Plan for SNF, date to be determined. Barriers to discharge: excessive secretions requiring frequent suctioning, bilateral hand mitts      Total floor/unit time spent was 25 minutes in reviewing the record, medications, lab results and completing documentation. 15 minutes spent in counseling and discussion with patient regarding diagnosis, medications and treatment plan. Care discussed and coordinated with patient and nurse.     SISI [facility]  Hospitalist Service  LT[facility]  Securely message with the Vocera Web Console (learn more here)  Text page via [employer]land Paging/Directory               ______________________________________________________________________    Interval History   Nursing with no concerns today. Patient slept off and on lastnight. Patient lying in bed and appears comfortable. Whispering words. I asked patient how he is doing and he responds, \"I'm doing fine.\" Denies pain. I asked if the hand mitts were bothering him and he said, \"no.\" Per RT notes and nursing patient continues to require frequent suctioning for oral secretions. Patient denies, fever, chills, shortness of breath, chest pain, nausea. Occasional loose stool due to tube feeding.     Data reviewed today: I reviewed all medications. No new labs and imaging results over the last 24 hours. I personally reviewed. Plan for labs tomorrow.     Physical Exam   Vital Signs: Temp: 98.6  F (37  C) Temp src: Oral BP: 104/69 " Pulse: 90   Resp: 20 SpO2: 96 % O2 Device: Trach dome Oxygen Delivery: 30 LPM  Weight: 151 lbs 14.4 oz     General Appearance:  Alert, calm, no apparent distress   Respiratory: coarse anterior breath sounds, no wheezing or crackles, tracheostomy patent with no visible secretions, surrounding skin around trach dry/intact  Cardiovascular: S1,S2, rhythm rate regular, negative murmur   GI: Bowel sounds positive x 4, non distended non tender, G/J tube appears patent with tube feeding infusing, surrounding skin dry/intact  Skin: face slightly flushed with slightly red papules on face, no pustules, likely mild folliculitis, pink, warm, dry and intact, no rash or open sore  Neurological: alert and oriented x 1, nonverbal due to trach, whispers/mouths words, moves upper extremities, able to lift right arm all the way up, moving left arm up but weaker compared to right with chronic left sided neglet, inconsistent following commands     Data   Medications     dextrose       sodium chloride         acetylcysteine  2 mL Nebulization BID     famotidine  20 mg Oral or Feeding Tube BID     fiber modular (NUTRISOURCE FIBER)  3 packet Per Feeding Tube TID     heparin ANTICOAGULANT  5,000 Units Subcutaneous Q8H     ipratropium - albuterol 0.5 mg/2.5 mg/3 mL  3 mL Nebulization BID     lisinopril  40 mg Oral or Feeding Tube Daily     menthol-zinc oxide   Topical TID     methylphenidate  10 mg Oral or Feeding Tube QAM AC     mirtazapine  7.5 mg Per Feeding Tube At Bedtime     scopolamine  1 patch Transdermal Q72H    And     scopolamine   Transdermal Q8H     sodium chloride  3 mL Nebulization BID     venlafaxine  37.5 mg Per Feeding Tube TID w/meals

## 2021-08-08 NOTE — PLAN OF CARE
Problem: Mechanical Ventilation  Goal: Patient will maintain patent airway  Outcome: Progressing  Goal: Tracheostomy will be managed safely  Outcome: Progressing  Goal: Respiratory status - ventilation  Description: Movement of air in and out of the lungs.    Liberate from ventilator  Outcome: Progressing   RT PROGRESS NOTE   [id number]  DATA:     CURRENT SETTINGS:             TRACH TYPE / SIZE: #6 Bivona, downsized from #7 Bivona on 7/28             MODE: TM 30% 30L              FIO2:        ACTION:             THERAPIES:   Duoneb/MucomystBID, Hypertonic SolBID             SUCTION:                           FREQUENCY:   X4 (X1 in am for copious white thick with cuff up, X3 for small and scant with PMV on)                        AMOUNT:                           CONSISTENCY:                           COLOR:                SPONTANEOUS COUGH EFFORT/STRENGTH OF EFFORT (not elicited by suctioning): Yes                              WEANING PHASE: 2                        WEAN MODE:  PMV started at 0845am  TM trials since 6/20, HFTM  cont since 7/18/,                    WEAN TIME:                           END WEAN REASON:       RESPONSE:             BS: coarse to clr               VITAL SIGNS:                EMOTIONAL NEEDS / CONCERNS:                  RISK FOR SELF DECANNULATION:                          RISK DUE TO:                          INTERVENTION/S IN PLACE IS/ARE:         NOTE / PLAN:       Pt has soft voice with PMV.  Went to PT Dpt today on port O2., reji well  Cont to monitor closely.

## 2021-08-09 ENCOUNTER — APPOINTMENT (OUTPATIENT)
Dept: OCCUPATIONAL THERAPY | Facility: CLINIC | Age: 71
DRG: 207 | End: 2021-08-09
Attending: HOSPITALIST
Payer: COMMERCIAL

## 2021-08-09 ENCOUNTER — APPOINTMENT (OUTPATIENT)
Dept: SPEECH THERAPY | Facility: CLINIC | Age: 71
DRG: 207 | End: 2021-08-09
Attending: HOSPITALIST
Payer: COMMERCIAL

## 2021-08-09 LAB
ANION GAP SERPL CALCULATED.3IONS-SCNC: 11 MMOL/L (ref 5–18)
BASOPHILS # BLD AUTO: 0 10E3/UL (ref 0–0.2)
BASOPHILS NFR BLD AUTO: 1 %
BUN SERPL-MCNC: 42 MG/DL (ref 8–22)
CALCIUM SERPL-MCNC: 11.1 MG/DL (ref 8.5–10.5)
CALCIUM, IONIZED MEASURED: 1.35 MMOL/L (ref 1.11–1.3)
CHLORIDE BLD-SCNC: 101 MMOL/L (ref 98–107)
CO2 SERPL-SCNC: 30 MMOL/L (ref 22–31)
CREAT SERPL-MCNC: 0.82 MG/DL (ref 0.7–1.3)
DEPRECATED CALCIDIOL+CALCIFEROL SERPL-MC: 25 UG/L (ref 30–80)
EOSINOPHIL # BLD AUTO: 0.3 10E3/UL (ref 0–0.7)
EOSINOPHIL NFR BLD AUTO: 4 %
ERYTHROCYTE [DISTWIDTH] IN BLOOD BY AUTOMATED COUNT: 14.2 % (ref 10–15)
GFR SERPL CREATININE-BSD FRML MDRD: >90 ML/MIN/1.73M2
GLUCOSE BLD-MCNC: 118 MG/DL (ref 70–125)
HCT VFR BLD AUTO: 32.8 % (ref 40–53)
HGB BLD-MCNC: 10.7 G/DL (ref 13.3–17.7)
IMM GRANULOCYTES # BLD: 0.1 10E3/UL
IMM GRANULOCYTES NFR BLD: 1 %
ION CA PH 7.4: 1.38 MMOL/L (ref 1.11–1.3)
LYMPHOCYTES # BLD AUTO: 1.4 10E3/UL (ref 0.8–5.3)
LYMPHOCYTES NFR BLD AUTO: 17 %
MCH RBC QN AUTO: 31.3 PG (ref 26.5–33)
MCHC RBC AUTO-ENTMCNC: 32.6 G/DL (ref 31.5–36.5)
MCV RBC AUTO: 96 FL (ref 78–100)
MONOCYTES # BLD AUTO: 0.7 10E3/UL (ref 0–1.3)
MONOCYTES NFR BLD AUTO: 9 %
NEUTROPHILS # BLD AUTO: 5.5 10E3/UL (ref 1.6–8.3)
NEUTROPHILS NFR BLD AUTO: 68 %
NRBC # BLD AUTO: 0 10E3/UL
NRBC BLD AUTO-RTO: 0 /100
PH: 7.44 (ref 7.35–7.45)
PLATELET # BLD AUTO: 289 10E3/UL (ref 150–450)
POTASSIUM BLD-SCNC: 4.5 MMOL/L (ref 3.5–5)
PTH-INTACT SERPL-MCNC: 26 PG/ML (ref 10–86)
RBC # BLD AUTO: 3.42 10E6/UL (ref 4.4–5.9)
SODIUM SERPL-SCNC: 142 MMOL/L (ref 136–145)
WBC # BLD AUTO: 8 10E3/UL (ref 4–11)

## 2021-08-09 PROCEDURE — 82330 ASSAY OF CALCIUM: CPT | Performed by: NURSE PRACTITIONER

## 2021-08-09 PROCEDURE — 94003 VENT MGMT INPAT SUBQ DAY: CPT | Performed by: INTERNAL MEDICINE

## 2021-08-09 PROCEDURE — 99207 PR CDG-CODE CATEGORY CHANGED: CPT | Performed by: INTERNAL MEDICINE

## 2021-08-09 PROCEDURE — 999N000157 HC STATISTIC RCP TIME EA 10 MIN

## 2021-08-09 PROCEDURE — 36415 COLL VENOUS BLD VENIPUNCTURE: CPT | Performed by: NURSE PRACTITIONER

## 2021-08-09 PROCEDURE — 94660 CPAP INITIATION&MGMT: CPT

## 2021-08-09 PROCEDURE — 94640 AIRWAY INHALATION TREATMENT: CPT

## 2021-08-09 PROCEDURE — 250N000013 HC RX MED GY IP 250 OP 250 PS 637

## 2021-08-09 PROCEDURE — 97110 THERAPEUTIC EXERCISES: CPT | Mod: GO | Performed by: OCCUPATIONAL THERAPIST

## 2021-08-09 PROCEDURE — 85025 COMPLETE CBC W/AUTO DIFF WBC: CPT | Performed by: NURSE PRACTITIONER

## 2021-08-09 PROCEDURE — 80048 BASIC METABOLIC PNL TOTAL CA: CPT | Performed by: NURSE PRACTITIONER

## 2021-08-09 PROCEDURE — 250N000009 HC RX 250: Performed by: INTERNAL MEDICINE

## 2021-08-09 PROCEDURE — 97535 SELF CARE MNGMENT TRAINING: CPT | Mod: GO | Performed by: OCCUPATIONAL THERAPIST

## 2021-08-09 PROCEDURE — 999N000009 HC STATISTIC AIRWAY CARE

## 2021-08-09 PROCEDURE — 92507 TX SP LANG VOICE COMM INDIV: CPT | Mod: GN

## 2021-08-09 PROCEDURE — 250N000011 HC RX IP 250 OP 636

## 2021-08-09 PROCEDURE — 94640 AIRWAY INHALATION TREATMENT: CPT | Mod: 76

## 2021-08-09 PROCEDURE — 94668 MNPJ CHEST WALL SBSQ: CPT

## 2021-08-09 PROCEDURE — 120N000017 HC R&B RESPIRATORY CARE

## 2021-08-09 PROCEDURE — 250N000013 HC RX MED GY IP 250 OP 250 PS 637: Performed by: INTERNAL MEDICINE

## 2021-08-09 PROCEDURE — 250N000013 HC RX MED GY IP 250 OP 250 PS 637: Performed by: HOSPITALIST

## 2021-08-09 PROCEDURE — 83970 ASSAY OF PARATHORMONE: CPT | Performed by: NURSE PRACTITIONER

## 2021-08-09 PROCEDURE — 17250 CHEM CAUT OF GRANLTJ TISSUE: CPT

## 2021-08-09 PROCEDURE — 92526 ORAL FUNCTION THERAPY: CPT | Mod: GN

## 2021-08-09 RX ADMIN — SODIUM CHLORIDE 30 MG/ML INHALATION SOLUTION 3 ML: 30 SOLUTION INHALANT at 07:33

## 2021-08-09 RX ADMIN — Medication 3 PACKET: at 20:40

## 2021-08-09 RX ADMIN — FAMOTIDINE 20 MG: 20 TABLET, FILM COATED ORAL at 20:23

## 2021-08-09 RX ADMIN — HEPARIN SODIUM 5000 UNITS: 5000 INJECTION, SOLUTION INTRAVENOUS; SUBCUTANEOUS at 13:44

## 2021-08-09 RX ADMIN — ACETYLCYSTEINE 2 ML: 200 SOLUTION ORAL; RESPIRATORY (INHALATION) at 07:33

## 2021-08-09 RX ADMIN — SODIUM CHLORIDE 30 MG/ML INHALATION SOLUTION 3 ML: 30 SOLUTION INHALANT at 19:19

## 2021-08-09 RX ADMIN — ANORECTAL OINTMENT: 15.7; .44; 24; 20.6 OINTMENT TOPICAL at 20:27

## 2021-08-09 RX ADMIN — VENLAFAXINE 37.5 MG: 37.5 TABLET ORAL at 12:11

## 2021-08-09 RX ADMIN — SILVER NITRATE APPLICATORS: 25; 75 STICK TOPICAL at 16:07

## 2021-08-09 RX ADMIN — ACETAMINOPHEN ORAL SOLUTION 650 MG: 650 SOLUTION ORAL at 18:35

## 2021-08-09 RX ADMIN — FAMOTIDINE 20 MG: 20 TABLET, FILM COATED ORAL at 09:03

## 2021-08-09 RX ADMIN — VENLAFAXINE 37.5 MG: 37.5 TABLET ORAL at 16:08

## 2021-08-09 RX ADMIN — Medication 3 PACKET: at 09:03

## 2021-08-09 RX ADMIN — VENLAFAXINE 37.5 MG: 37.5 TABLET ORAL at 09:03

## 2021-08-09 RX ADMIN — ANORECTAL OINTMENT: 15.7; .44; 24; 20.6 OINTMENT TOPICAL at 09:04

## 2021-08-09 RX ADMIN — METHYLPHENIDATE HYDROCHLORIDE 10 MG: 10 TABLET ORAL at 06:22

## 2021-08-09 RX ADMIN — LISINOPRIL 40 MG: 20 TABLET ORAL at 18:39

## 2021-08-09 RX ADMIN — Medication 3 PACKET: at 13:45

## 2021-08-09 RX ADMIN — IPRATROPIUM BROMIDE AND ALBUTEROL SULFATE 3 ML: 2.5; .5 SOLUTION RESPIRATORY (INHALATION) at 19:20

## 2021-08-09 RX ADMIN — TRIAMCINOLONE ACETONIDE: 5 OINTMENT TOPICAL at 09:04

## 2021-08-09 RX ADMIN — TRIAMCINOLONE ACETONIDE: 5 OINTMENT TOPICAL at 20:27

## 2021-08-09 RX ADMIN — HEPARIN SODIUM 5000 UNITS: 5000 INJECTION, SOLUTION INTRAVENOUS; SUBCUTANEOUS at 06:22

## 2021-08-09 RX ADMIN — ACETYLCYSTEINE 2 ML: 200 SOLUTION ORAL; RESPIRATORY (INHALATION) at 19:20

## 2021-08-09 RX ADMIN — HEPARIN SODIUM 5000 UNITS: 5000 INJECTION, SOLUTION INTRAVENOUS; SUBCUTANEOUS at 20:51

## 2021-08-09 RX ADMIN — IPRATROPIUM BROMIDE AND ALBUTEROL SULFATE 3 ML: 2.5; .5 SOLUTION RESPIRATORY (INHALATION) at 07:33

## 2021-08-09 RX ADMIN — MIRTAZAPINE 7.5 MG: 7.5 TABLET ORAL at 20:23

## 2021-08-09 RX ADMIN — ANORECTAL OINTMENT: 15.7; .44; 24; 20.6 OINTMENT TOPICAL at 13:51

## 2021-08-09 ASSESSMENT — MIFFLIN-ST. JEOR: SCORE: 1483.15

## 2021-08-09 NOTE — PROGRESS NOTES
Pulmonary Medicine Follow up Note - Ventilator Rounds:    Clinical status discussed today with RN and respiratory therapist..    Chief complaint: Ongoing respiratory failure  Significant change in clinical status during past 24 hours? -  Tolerating trach mask 24 hours moderate trach secretions       Ventilation Mode: Trach collar  FiO2 (%): 30 %  Oxygen Concentration (%): 30 %  Resp: 20    Tracheal secretions: moderate secretions   Current phase of ventilator weaning pathway:  Phase 2  Ventilator weaning results from yesterday: 24 hours trach mask    Current Facility-Administered Medications   Medication     acetaminophen (TYLENOL) solution 650 mg    Or     acetaminophen (TYLENOL) tablet 650 mg     acetylcysteine (MUCOMYST) 20 % nebulizer solution 2 mL     alteplase (CATHFLO ACTIVASE) injection 2 mg     bisacodyl (DULCOLAX) Suppository 10 mg     dextrose 10% infusion     dextrose 50 % injection 25-50 mL    Or     glucagon injection 1 mg     docusate (COLACE) 50 MG/5ML liquid 100 mg    Or     docusate sodium (COLACE) capsule 100 mg     famotidine (PEPCID) tablet 20 mg     fiber modular (NUTRISOURCE FIBER) packet 3 packet     heparin ANTICOAGULANT injection 5,000 Units     hydrALAZINE (APRESOLINE) injection 10 mg     ipratropium - albuterol 0.5 mg/2.5 mg/3 mL (DUONEB) neb solution 3 mL     lisinopril (ZESTRIL) tablet 40 mg     loperamide (IMODIUM) liquid 1 mg     magnesium hydroxide (MILK OF MAGNESIA) suspension 30 mL     menthol-zinc oxide (CALMOSEPTINE) 0.44-20.6 % ointment OINT     methylphenidate (RITALIN) tablet 10 mg     mirtazapine (REMERON) tablet TABS 7.5 mg     naloxone (NARCAN) injection 0.2 mg    Or     naloxone (NARCAN) injection 0.4 mg    Or     naloxone (NARCAN) injection 0.2 mg    Or     naloxone (NARCAN) injection 0.4 mg     ondansetron (ZOFRAN) solution 4 mg     polyethylene glycol (MIRALAX) Packet 17 g     scopolamine (TRANSDERM) 72 hr patch 1 patch    And     scopolamine (TRANSDERM-SCOP) Patch in  "Place     silver nitrate (ARZOL) Misc     sodium chloride (NEBUSAL) 3 % neb solution 3 mL     sodium chloride 0.9% infusion     triamcinolone (KENALOG) 0.5 % ointment     venlafaxine (EFFEXOR) tablet 37.5 mg       Physical exam     /74 (BP Location: Left arm)   Pulse 89   Temp 99.3  F (37.4  C) (Axillary)   Resp 20   Ht 1.803 m (5' 10.98\")   Wt 69.1 kg (152 lb 6.4 oz)   SpO2 92%   BMI 21.27 kg/m      Gen: awake, poorly interactive, trach on trach mask  HEENT: pale conjunctiva, moist mucosa  Neck: s/p trach   Lungs: discrete ronchi both HT  CV: regular, no murmurs or gallops appreciated  Abdomen: soft, NT, BS wnl  Ext: no edema  Neuro: awake, poor interaction, moves spontaneously upper and lower extremities    Sodium   Date Value Ref Range Status   08/09/2021 142 136 - 145 mmol/L Final   06/11/2021 143 133 - 144 mmol/L Final     Potassium   Date Value Ref Range Status   08/09/2021 4.5 3.5 - 5.0 mmol/L Final   06/11/2021 3.8 3.4 - 5.3 mmol/L Final     Chloride   Date Value Ref Range Status   08/09/2021 101 98 - 107 mmol/L Final   06/11/2021 106 94 - 109 mmol/L Final     Carbon Dioxide   Date Value Ref Range Status   06/11/2021 36 (H) 20 - 32 mmol/L Final     Carbon Dioxide (CO2)   Date Value Ref Range Status   08/09/2021 30 22 - 31 mmol/L Final     Anion Gap   Date Value Ref Range Status   08/09/2021 11 5 - 18 mmol/L Final   06/11/2021 2 (L) 3 - 14 mmol/L Final     Glucose   Date Value Ref Range Status   08/09/2021 118 70 - 125 mg/dL Final   06/11/2021 142 (H) 70 - 99 mg/dL Final     Urea Nitrogen   Date Value Ref Range Status   08/09/2021 42 (H) 8 - 22 mg/dL Final   06/11/2021 25 7 - 30 mg/dL Final     Creatinine   Date Value Ref Range Status   08/09/2021 0.82 0.70 - 1.30 mg/dL Final   06/11/2021 0.57 (L) 0.66 - 1.25 mg/dL Final     GFR Estimate   Date Value Ref Range Status   08/09/2021 >90 >60 mL/min/1.73m2 Final     Comment:     As of July 11, 2021, eGFR is calculated by the CKD-EPI creatinine " equation, without race adjustment. eGFR can be influenced by muscle mass, exercise, and diet. The reported eGFR is an estimation only and is only applicable if the renal function is stable.   07/09/2021 >60 >60 mL/min/1.73m2 Final   06/11/2021 >90 >60 mL/min/[1.73_m2] Final     Comment:     Non  GFR Calc  Starting 12/18/2018, serum creatinine based estimated GFR (eGFR) will be   calculated using the Chronic Kidney Disease Epidemiology Collaboration   (CKD-EPI) equation.       Calcium   Date Value Ref Range Status   08/09/2021 11.1 (H) 8.5 - 10.5 mg/dL Final   06/11/2021 9.6 8.5 - 10.1 mg/dL Final       XR CHEST PORT 1 VIEW  LOCATION: [facility]  DATE/TIME: 7/15/2021 7:50 AM  INDICATION: New VAP followup.  COMPARISON: 07/09/2021.  IMPRESSION:   No change in tracheostomy tube. Heart and mediastinal size are stable. There is indistinctness of the pulmonary interstitium, representing either airway inflammation or edema. There is bandlike opacity involving the retrocardiac region of the left lung base, increased from prior study and representing either atelectasis or infectious process. No pleural effusion or pneumothorax. There is some mild elevation of the right hemidiaphragm.    Diagnosis:     Patient is a 71 yo man with history of hypertension. Patient initially presented on 15-May-2021 to [facility] after being found unresponsive by his wife. Patient was then found to have a subarachnoid hemorrhage secondary to a ruptured aneurysm. Patient was intubated and was started on mannitol, IV antihypertensives and hyperventilation and patient was then transferred to [facility]. Patient underwent aneurysm clipping as well as placement of extra-ventricular drain from hydrocephalus on 15-May-2021. Extra-ventricular drain would malfunction and would require replacement on 04-Jun-2021 and 06-Jun-2021. Extra-ventricular drain reportedly was removed on 09-Jun-2021.  Patient had intra-arterial vasospasm treatment with verapamil on 28-May-2021.  Patient was extubated on 16-May-2021 but had to be reintubated on 19-May-2021. Patient would be extubated on 03-Jun-2021 but would be reintubated on 06-Jun-2021. Patient had tracheostomy and PEG-tube placed on 07-Jun-2021. Patient required treatment for hospital-acquired pneumonia. Patient was transferred to LTAC on 11-Jun-2021.    1. Acute respiratory failure s/p trach 6/7/2021  2. S/p treatment pseudomonas ESBL and klebsiella pneumonia   3. Encephalopathy   4. Subarachnoid bleed s/p craniotomy and clipping P-comm rupture aneurysm.   5. HTN  6. Malnourishment   7. Dysphagia s/p G tube    Recommendations/Plans (MDM):  1. Weaning orders: continue phase 2 weaning trial   2. Trach change? no  3. Diagnostic testing? no  4. Continue pulmonary toiletting, scheduled ipratopium/albuterol, saline and mucomyst  nebs  5. Scopolamine patch   6. DVT prophylaxis heparin SQ    Marcelino Navarro  Pulmonary / Critical Care  08/09/21 4:28 PM

## 2021-08-09 NOTE — PROGRESS NOTES
WOC RN Progress Note    Today's Visit: Nursing asked [facility] to assess previously signed off patient for hypergranulation at trach site.   Did note some circumferential hyperplasia, extending out 0.2cm 8-4 o'clock and up to 0.5cm 4-8 o'clock,with thin layer of hypergranulation on top.  Hypergranulation was treated with 1 stick of silver nitrate. Patient tolerated well.   Will reassess later this week for any further treatment needed.  Glenda Bo, SAUDN, RN, PHN, HNB-BC, CWOCN

## 2021-08-09 NOTE — PROGRESS NOTES
NUTRITION BRIEF NOTE     Chart check for nutrition support patient. Chart reviewed and discussed with CNP and PharmD    Updates:  - vitamin D lab results show deficiency  Lab Results   Component Value Date    VITDT 25 (L) 08/06/2021    VITDT 20 (L) 06/05/2013   - Calcium still trending high at 11.1 mg/dL, likely influenced by lack of mobility x3 months.    Recommendations:    - trial lower rate of Vital 1.5 with supplemented Benecalorie and Prosource to meet protein and calorie needs once products arrive.  - monitor Ca labs for changes    Future Recommendations:  - decrease Vital 1.5 rate from 90 mL/hr to 60 mL/hr once Benecalorie arrives from [facility] [place] [place]    Vital 1.5 Cristi @ goal of  60ml/hr  (1440ml/day)  will provide: 2160 kcals, 97 g PRO, 1100 ml free H20, 269 g CHO, and 8 g fiber, 1872mg Ca daily. Water flushes per MD.  + 3 pkts Prosource/day to provide 0mg Ca, 120 kcal, 33g protein  + 3 Benecalorie/day to provide 300mg Ca, 990 kcal, 21g protein  +2 Nutrisource fiber to provide 30 kcals, 6g fiber    Daily Totals:  2172mg Ca  3300 kcal  151g protein  14g fiber    Will update TF orders tomorrow once Benecalorie arrives. Please page/consult as needed.    Dulce Gonzales RDN, LD  Clinical Dietitian

## 2021-08-09 NOTE — PROGRESS NOTES
RT PROGRESS NOTE    DATA  TRACH TYPE -- Bivona #6 Cuffed (7/28)     ACTION  THERAPIES -- BID Mucomyst, Duoneb, and NaCl 3% with metaneb   SUCTION   FREQUENCY -- 8   AMOUNT -- moderate to large    CONSISTENCY -- thick    COLOR -- white    SPONTANEOUS COUGH -- good   WEAN PHASE #2   MODE -- HTD 25 LPM 40%   DURATION -- 24/7   END REASON -- N/A    RESPONSE  BREATH SOUNDS -- coarse  VITAL SIGNS --  Temp:  [98.6  F (37  C)-99.9  F (37.7  C)] 99.9  F (37.7  C)  Pulse:  [75-95] 86  Resp:  [16-24] 16  BP: (104-134)/(69-86) 134/79  FiO2 (%):  [21 %-40 %] 40 %  SpO2:  [82 %-96 %] 92 %   EMOTIONAL CONCERNS -- None  RISK FOR SELF-DECANNULATION -- No     NOTE   No signs of respiratory distress. RT will continue to monitor.     Kayla Small, RT on 8/9/2021 at 6:05 AM

## 2021-08-09 NOTE — PLAN OF CARE
Problem: Device-Related Complication Risk (Artificial Airway)  Goal: Optimal Device Function  Outcome: Improving  Intervention: Optimize Device Care and Function  Recent Flowsheet Documentation  Taken 8/8/2021 1921 by Kayla Small, RT  Airway Safety Measures:   all equipment/monitors on and audible   manual resuscitator/mask/valve in room   suction at bedside   suction regulator   suction equipment   oxygen flowmeter     Problem: Skin and Tissue Injury (Artificial Airway)  Goal: Absence of Device-Related Skin or Tissue Injury  Outcome: Improving     Problem: Device-Related Complication Risk (Mechanical Ventilation, Invasive)  Goal: Optimal Device Function  Intervention: Optimize Device Care and Function  Recent Flowsheet Documentation  Taken 8/8/2021 1921 by Kayla Small, RT  Airway Safety Measures:   all equipment/monitors on and audible   manual resuscitator/mask/valve in room   suction at bedside   suction regulator   suction equipment   oxygen flowmeter

## 2021-08-09 NOTE — PROGRESS NOTES
"[facility] Palliative Care Social Work Note    Called pt's wife Susy to offer emotional support. She is taking it \"one day at a time\" and staying optimistic. She needed to take another call so asked to call me back. Encouraged her to do so. PC SW will continue to call Susy to offer support.     Josephine Dailey, [facility]  Palliative Care   Office # [phone]  "

## 2021-08-09 NOTE — PLAN OF CARE
Problem: Mechanical Ventilation  Goal: Patient will maintain patent airway  Outcome: Progressing  Goal: Tracheostomy will be managed safely  Outcome: Progressing    RT PROGRESS NOTE     DATA:     CURRENT SETTINGS:               TRACH TYPE / SIZE: #6 B[place], placed 7/28/2021             MODE:  HF 30L TM/ PMV   FIO2:  30%     ACTION:             THERAPIES: Duo neb x QID via [employer], Sodium Chloride neb BID given on scheduled time             SUCTION:                           FREQUENCY: 4                        AMOUNT: copious x 1/ large                        CONSISTENCY: thick, lavage x 1                         COLOR:   white             SPONTANEOUS COUGH EFFORT/STRENGTH OF EFFORT (not elicited by suctioning): strong, productive, swallow                               WEANING PHASE:   2                        WEAN MODE: 30% HF 30L TM/ PMV                        WEAN TIME: TM cont as tolerated, PMV on since 0850                        END WEAN REASON:      RESPONSE:             BS:   coarse to slightly coarse z- clear decreased on R side, clear on L side after Sx              VITAL SIGNS: O2 sat 91-94%, HR 84-94, RR 22-24             EMOTIONAL NEEDS / CONCERNS:  None               RISK FOR SELF DECANNULATION:  Yes                        RISK DUE TO: Confusion                        INTERVENTION/S IN PLACE IS/ARE: Restraints x2       NOTE / PLAN: continue current plan of care - TM with PMV during the day, PMV off for night, cuff up.

## 2021-08-09 NOTE — PROGRESS NOTES
Dr Knox paged regarding pt's high heart rate in the 140's with activities and 120's at rest. No call back as yet.

## 2021-08-09 NOTE — PLAN OF CARE
Pt is alert, continues to be restless and confused. Bilat mittens on and inplace. Pt continues to pull at tubes. Was hoyered up to the chair today for 3 hours, tolerated well. Condom cath inplace and draining clear yellow urine. Lungs are coarse bilaterally. Suctioned X 3 this shift by RN with white thin secretions. Appeared comfortable. Does not appear to be in any pain.  Problem: Adult Inpatient Plan of Care  Goal: Plan of Care Review  Outcome: No Change  Goal: Optimal Comfort and Wellbeing  Outcome: No Change     Problem: Anxiety  Goal: Anxiety Reduction or Resolution  Outcome: No Change     Problem: Adult Inpatient Plan of Care  Goal: Absence of Hospital-Acquired Illness or Injury  Intervention: Identify and Manage Fall Risk  Recent Flowsheet Documentation  Taken 8/9/2021 0850 by Erika Mccloud RN  Safety Promotion/Fall Prevention: assistive device/personal items within reach  Intervention: Prevent Infection  Recent Flowsheet Documentation  Taken 8/9/2021 0850 by Erika Mccloud RN  Infection Prevention: hand hygiene promoted     Problem: Device-Related Complication Risk (Mechanical Ventilation, Invasive)  Goal: Optimal Device Function  Intervention: Optimize Device Care and Function  Recent Flowsheet Documentation  Taken 8/9/2021 0850 by Erika Mccloud, RN  Airway Safety Measures: all equipment/monitors on and audible     Problem: Device-Related Complication Risk (Artificial Airway)  Goal: Optimal Device Function  Intervention: Optimize Device Care and Function  Recent Flowsheet Documentation  Taken 8/9/2021 0850 by Erika Mccloud, RN  Airway Safety Measures: all equipment/monitors on and audible

## 2021-08-09 NOTE — PROGRESS NOTES
[facility]    Medicine Progress Note - Hospitalist Service       Date of Admission:  6/11/2021    Identifier:  70-year-old retired man who lived with his wife at home prehospitalization, who originally presented to [facility] on May 15, 2021 for altered mental state. He had sustained a fall and was found to be unresponsive by his wife.  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  He was treated with milrinone and verapamil.  He was successfully extubated on Pamela 3 and on June 6 he was reintubated due to drop in his saturations and underwent tracheostomy placement as well as feeding tube placement on June 7.  June 9: EVD was removed.  He was transferred to LTAC on June 11  In the last 1 month he has not been able to be liberated from the ventilator.  He has had repeated bouts of Klebsiella infection in sputum and ventilator associated pneumonia.  Additionally he has had a large amount of tracheal secretions, significant cognitive impairment, left-sided neglect and ongoing episodes of agitation.  Patient's family has been monitoring the patient continuously during the daytime hours via video monitor and visit with him in person over the weekends.  Multiple specialties have been involved in the care of this patient including palliative care, infectious disease pulmonary medicine and neurology     Assessment & Plan           1/.  Acute hypoxemic respiratory failure secondary to prolonged hospitalization, significant intracranial hemorrhage.  - excessive thick white to pale yellow secretions is the main barrier to decannulation   - glycopyrolate was d/thang 2 weeks back per  pulmonary medicine  -Continues to be on a scopolamine patch  - RT reporting small to copious secretions requiring suctioning up to 6-8 times per shift   - Consider sputum culture if increased or yellow secretions   - Tolerating passy catie valve up to 11 hours during the day     2/.  Insomnia: Ritalin twice a day, Ritalin at bedtime was probably making insomnia worsen. Bedtime Ritalin stopped 8/3/21 and continues dose of once daily in the morning.  - Continue with nighttime mirtazapine 7.5 mg scheduled. Can be uptitrated as appropriate. Nurse reporting patient slept well last evening    3/.  Traumatic brain injury/intracranial hemorrhage: Effexor was started about 2 weeks ago at 25 mg 3 times a day, uptitrated to 37.5 mg 3 times a day, family informed    4/.  Dysphagia: Secondary to respiratory failure continue with tube feeds at this time.  Even though the patient passed a blue dye test about two weeks ago he still has a lot of secretions    5/.  Ventilator associated pneumonia appreciate intervention and recommendations by infectious disease antibiotics continued until 7/18. Off all Abx now. Repeat sputum culture if yellow or increased secretions. - Temp last alejandro 99.9, this morning 99.1. Nursing to notify provider if concerns of fever; temp >100 F, chills, flushing of skin, change in respiratory status     6./Hypercalcemia  - Ca+ level slightly elevated this last week 10.6-11. Note from dietician that patient is receiving 2800 mg of calcium/day via tube feeding.   - Labs today show serum calcium 11.1, ionized ph calcium 1.38, ionized calcium measured 1.35. All slightly elevated.  - Likely due to calcium amount in tube feeding. Also could be secondary to decreased mobility. Would like to rule out parathyroid disease.   - Lab add-on this morning; albumin, phosphorus, vitamin D and parathyroid hormone   - Will speak to dietician about adjusting calcium amount in tube feeding     7/.  Restraints: We have been using  hand mitts and 1:1 sitter has not been needed. Order renewed today. Nursing continues to assess need for restraints daily. Patient still attempting to pull tubes with mitts off. Consider abdominal binder to protect G-tube from being pulled.     8/. Seborrheic dermatitis of face  - practice good hygiene, wash face daily with mild soap and water, moisturize 1-2 times daily   - triamcinolone 0.025% cream bid x 3 days. No signs of infection. No itching or pain     9/.  Prognosis: This is challenge because patient's wife is having a difficult time with the situation and recognizing that the patient is unlikely going to be his prehospitalization self.      Diet: Adult Formula Drip Feeding: Continuous Vital 1.5; Gastrostomy; Goal Rate: 90; mL/hr; Medication - Feeding Tube Flush Frequency: At least 15-30 mL water before and after medication administration and with tube clogging;       DVT Prophylaxis: Heparin SQ  Gutierrez Catheter: condom cath  Central Lines: None  Code Status: Full Code      Disposition Plan   Expected discharge: Plan for SNF, date to be determined. Barriers to discharge: excessive secretions requiring frequent suctioning, bilateral hand mitts   Total floor/unit time spent was 25 minutes in reviewing the record, medications, lab results and completing documentation. 15 minutes spent in counseling and discussion with patient regarding diagnosis, medications and treatment plan. Care discussed and coordinated with patient and nurse. Attempted to call patient's wife Susy Neri yesterday, went to voicemail. Will try to connect with spouse this afternoon via phone or ipad    Total floor/unit time spent was 25 minutes in reviewing the record, medications, lab results and completing documentation. 15 minutes spent in counseling and discussion with patient regarding diagnosis, medications and treatment plan. Care discussed and coordinated with patient and nurse.     SISI Moore CNP  Hospitalist  "Service  LT[facility]  Securely message with the Vocera Web Console (learn more here)  Text page via [employer] Paging/Directory                                           ______________________________________________________________________    Interval History   Nursing with no new concerns today. Patient slept well lastnight. Still having moderate to large thick white secretions requiring suctioning 6-8 times per shift. Nurses note yesterday that granulation tissue of trach site was seen at 12:00 with small bleeding and [facility] nurse has been notified to assess.   Patient lying in bed awake and appears comfortable. Whispering words which are partially understandable. He does not currently have passy catie valve in place, but has been tolerating the pmv up to 11 hours during the day. Patient mouths \"hello\" to me and when I asked how he's doing he responds, \"good.\" He is gesturing with his right hand toward the ipad. Staff present in the room tell him wife is not on the ipad yet this morning. He is smiling and appears in no acute distress. Denies pain. Patient denies fever, chills, shortness of breath, chest pain, nausea. Occasional loose stool due to tube feeding.        Data reviewed today: I reviewed all medications, new labs and imaging results over the last 24 hours. I personally reviewed no images or EKG's today.    Physical Exam   Vital Signs: Temp: 99.9  F (37.7  C) Temp src: Oral BP: 134/79 Pulse: 89   Resp: 16 SpO2: 93 % O2 Device: Trach dome Oxygen Delivery: 30 LPM  Weight: 152 lbs 6.4 oz     General Appearance:  Alert, calm, no apparent distress   Respiratory: coarse anterior breath sounds, no wheezing or crackles, tracheostomy patent with no visible secretions, surrounding skin around trach dry/intact, no bleeding around trach site   Cardiovascular: S1,S2, rhythm rate regular, negative murmur   GI: Bowel sounds positive x 4, non distended non tender, G/J tube appears patent with tube feeding infusing, surrounding skin " dry/intact  Skin: face slightly flushed with slightly red papules on face and neck, nasolabial folds with erythema/flakiness, no pustules,  Neurological: alert and oriented x 1, nonverbal due to trach, whispers/mouths words, moves upper extremities, able to lift right arm all the way up, moving left arm up but weaker compared to right with chronic left sided neglet, inconsistent following commands     Data   Recent Labs   Lab 08/09/21  0615 08/06/21  0622 08/04/21  0616   WBC 8.0  --  9.3   HGB 10.7*  --  10.4*   MCV 96  --  97     --  321    139 141   POTASSIUM 4.5 4.7 4.2   CHLORIDE 101 99 101   CO2 30 31 32*   BUN 42* 37* 34*   CR 0.82 0.78 0.82   ANIONGAP 11 9 8   RAGINI 11.1* 10.6* 10.9*    111 114     Medications     dextrose       sodium chloride         acetylcysteine  2 mL Nebulization BID     famotidine  20 mg Oral or Feeding Tube BID     fiber modular (NUTRISOURCE FIBER)  3 packet Per Feeding Tube TID     heparin ANTICOAGULANT  5,000 Units Subcutaneous Q8H     ipratropium - albuterol 0.5 mg/2.5 mg/3 mL  3 mL Nebulization BID     lisinopril  40 mg Oral or Feeding Tube Daily     menthol-zinc oxide   Topical TID     methylphenidate  10 mg Oral or Feeding Tube QAM AC     mirtazapine  7.5 mg Per Feeding Tube At Bedtime     scopolamine  1 patch Transdermal Q72H    And     scopolamine   Transdermal Q8H     sodium chloride  3 mL Nebulization BID     triamcinolone   Topical BID     venlafaxine  37.5 mg Per Feeding Tube TID w/meals

## 2021-08-09 NOTE — PLAN OF CARE
Problem: Adult Inpatient Plan of Care  Goal: Plan of Care Review  Outcome: No Change     Problem: Restraint for Non-Violent/Non-Self-Destructive Behavior  Goal: Prevent/Manage Potential Problems  Description: Maintain safety of patient and others during period of restraint.  Promote psychological and physical wellbeing.  Prevent injury to skin and involved body parts.  Promote nutrition, hydration, and elimination.  Outcome: No Change     Problem: Skin and Tissue Injury (Artificial Airway)  Goal: Absence of Device-Related Skin or Tissue Injury  Outcome: No Change     Problem: Adult Inpatient Plan of Care  Goal: Absence of Hospital-Acquired Illness or Injury  Intervention: Identify and Manage Fall Risk  Recent Flowsheet Documentation  Taken 8/8/2021 2144 by Marni Lama RN  Safety Promotion/Fall Prevention: assistive device/personal items within reach  Intervention: Prevent Infection  Recent Flowsheet Documentation  Taken 8/8/2021 2144 by Marni Lama RN  Infection Prevention: hand hygiene promoted     Problem: Device-Related Complication Risk (Mechanical Ventilation, Invasive)  Goal: Optimal Device Function  Intervention: Optimize Device Care and Function  Recent Flowsheet Documentation  Taken 8/8/2021 2144 by Marni Lama RN  Airway Safety Measures: all equipment/monitors on and audible     Problem: Device-Related Complication Risk (Artificial Airway)  Goal: Optimal Device Function  Intervention: Optimize Device Care and Function  Recent Flowsheet Documentation  Taken 8/8/2021 2144 by Marni Lama RN  Airway Safety Measures: all equipment/monitors on and audible  Pt started on a new ointment today for rash on patched of red on face and neck. Sx several times throughout the night for copious amount of secretions. Mitts remain in place for pt safety. Tolerated repositioning every two hrs. Slept intermittently 5-6 hrs.

## 2021-08-09 NOTE — PROGRESS NOTES
I spoke to patient's wife Susy Neri via iPad in patient's room for 15 minutes today.   We discussed elevated calcium levels which are likely secondary to the 2800 mg of calcium per day patient is receiving via tube feeding.    I did speak with the dietitian Dulce Gonzales today who will look at ways to reduce the total amount of calcium with tube feeds.  Parathyroid hormone was was normal.  Vitamin D, phosphorus and albumin labs are pending  Discussed with patient's wife the facial rash which is being treated with 3 days of low potency steroid cream.    Also discussed continuing moderate to large amounts of oral secretions requiring frequent suctioning.  Patient's wife was here over the weekend and felt that there was a slight improvement with secretions.  Discussed sleeping with reports of patient sleeping well last evening. Will continue mirtazipine at current dose.   Susy Neri's questions were answered.  She understands that barriers to discharge include excess secretions requiring frequent suctioning and hand mitts.  Nursing and hospital medicine provider will assess need for hand mitts daily.  Agreeable to plan of care.    Flavia De Jesus [facility] Medicine Team  [facility]

## 2021-08-10 ENCOUNTER — APPOINTMENT (OUTPATIENT)
Dept: SPEECH THERAPY | Facility: CLINIC | Age: 71
DRG: 207 | End: 2021-08-10
Attending: HOSPITALIST
Payer: COMMERCIAL

## 2021-08-10 ENCOUNTER — APPOINTMENT (OUTPATIENT)
Dept: OCCUPATIONAL THERAPY | Facility: CLINIC | Age: 71
DRG: 207 | End: 2021-08-10
Attending: HOSPITALIST
Payer: COMMERCIAL

## 2021-08-10 ENCOUNTER — APPOINTMENT (OUTPATIENT)
Dept: PHYSICAL THERAPY | Facility: CLINIC | Age: 71
DRG: 207 | End: 2021-08-10
Attending: HOSPITALIST
Payer: COMMERCIAL

## 2021-08-10 LAB — GLUCOSE BLDC GLUCOMTR-MCNC: 108 MG/DL (ref 70–125)

## 2021-08-10 PROCEDURE — 120N000017 HC R&B RESPIRATORY CARE

## 2021-08-10 PROCEDURE — 94660 CPAP INITIATION&MGMT: CPT

## 2021-08-10 PROCEDURE — 94640 AIRWAY INHALATION TREATMENT: CPT | Mod: 76

## 2021-08-10 PROCEDURE — 250N000009 HC RX 250: Performed by: INTERNAL MEDICINE

## 2021-08-10 PROCEDURE — 999N000009 HC STATISTIC AIRWAY CARE

## 2021-08-10 PROCEDURE — 94668 MNPJ CHEST WALL SBSQ: CPT

## 2021-08-10 PROCEDURE — 94640 AIRWAY INHALATION TREATMENT: CPT

## 2021-08-10 PROCEDURE — 99207 PR CDG-CODE CATEGORY CHANGED: CPT | Performed by: INTERNAL MEDICINE

## 2021-08-10 PROCEDURE — 250N000013 HC RX MED GY IP 250 OP 250 PS 637: Performed by: INTERNAL MEDICINE

## 2021-08-10 PROCEDURE — 999N000157 HC STATISTIC RCP TIME EA 10 MIN

## 2021-08-10 PROCEDURE — 97535 SELF CARE MNGMENT TRAINING: CPT | Mod: GO | Performed by: OCCUPATIONAL THERAPIST

## 2021-08-10 PROCEDURE — 250N000013 HC RX MED GY IP 250 OP 250 PS 637: Performed by: HOSPITALIST

## 2021-08-10 PROCEDURE — 94003 VENT MGMT INPAT SUBQ DAY: CPT | Performed by: INTERNAL MEDICINE

## 2021-08-10 PROCEDURE — 250N000013 HC RX MED GY IP 250 OP 250 PS 637

## 2021-08-10 PROCEDURE — 250N000011 HC RX IP 250 OP 636

## 2021-08-10 PROCEDURE — 92526 ORAL FUNCTION THERAPY: CPT | Mod: GN

## 2021-08-10 PROCEDURE — 97112 NEUROMUSCULAR REEDUCATION: CPT | Mod: GP | Performed by: PHYSICAL THERAPIST

## 2021-08-10 PROCEDURE — 999N000123 HC STATISTIC OXYGEN O2DAILY TECH TIME

## 2021-08-10 PROCEDURE — 99233 SBSQ HOSP IP/OBS HIGH 50: CPT | Performed by: INTERNAL MEDICINE

## 2021-08-10 PROCEDURE — 97110 THERAPEUTIC EXERCISES: CPT | Mod: GP | Performed by: PHYSICAL THERAPIST

## 2021-08-10 RX ORDER — DEXTROSE MONOHYDRATE 100 MG/ML
INJECTION, SOLUTION INTRAVENOUS CONTINUOUS PRN
Status: DISCONTINUED | OUTPATIENT
Start: 2021-08-10 | End: 2021-08-10

## 2021-08-10 RX ORDER — ACETAMINOPHEN 325 MG/1
650 TABLET ORAL EVERY 4 HOURS PRN
Status: DISCONTINUED | OUTPATIENT
Start: 2021-08-10 | End: 2021-09-27 | Stop reason: HOSPADM

## 2021-08-10 RX ADMIN — Medication 3 PACKET: at 21:40

## 2021-08-10 RX ADMIN — ANORECTAL OINTMENT: 15.7; .44; 24; 20.6 OINTMENT TOPICAL at 21:39

## 2021-08-10 RX ADMIN — ANORECTAL OINTMENT: 15.7; .44; 24; 20.6 OINTMENT TOPICAL at 08:15

## 2021-08-10 RX ADMIN — FAMOTIDINE 20 MG: 20 TABLET, FILM COATED ORAL at 21:41

## 2021-08-10 RX ADMIN — METHYLPHENIDATE HYDROCHLORIDE 10 MG: 10 TABLET ORAL at 05:27

## 2021-08-10 RX ADMIN — HEPARIN SODIUM 5000 UNITS: 5000 INJECTION, SOLUTION INTRAVENOUS; SUBCUTANEOUS at 05:27

## 2021-08-10 RX ADMIN — FAMOTIDINE 20 MG: 20 TABLET, FILM COATED ORAL at 08:15

## 2021-08-10 RX ADMIN — TRIAMCINOLONE ACETONIDE: 5 OINTMENT TOPICAL at 21:39

## 2021-08-10 RX ADMIN — Medication 3 PACKET: at 08:15

## 2021-08-10 RX ADMIN — TRIAMCINOLONE ACETONIDE: 5 OINTMENT TOPICAL at 08:15

## 2021-08-10 RX ADMIN — HEPARIN SODIUM 5000 UNITS: 5000 INJECTION, SOLUTION INTRAVENOUS; SUBCUTANEOUS at 21:41

## 2021-08-10 RX ADMIN — SCOPALAMINE 1 PATCH: 1 PATCH, EXTENDED RELEASE TRANSDERMAL at 13:46

## 2021-08-10 RX ADMIN — SODIUM CHLORIDE 30 MG/ML INHALATION SOLUTION 3 ML: 30 SOLUTION INHALANT at 07:51

## 2021-08-10 RX ADMIN — VENLAFAXINE 37.5 MG: 37.5 TABLET ORAL at 13:10

## 2021-08-10 RX ADMIN — Medication 3 PACKET: at 13:48

## 2021-08-10 RX ADMIN — ACETYLCYSTEINE 2 ML: 200 SOLUTION ORAL; RESPIRATORY (INHALATION) at 19:29

## 2021-08-10 RX ADMIN — VENLAFAXINE 37.5 MG: 37.5 TABLET ORAL at 16:16

## 2021-08-10 RX ADMIN — LISINOPRIL 40 MG: 20 TABLET ORAL at 18:35

## 2021-08-10 RX ADMIN — IPRATROPIUM BROMIDE AND ALBUTEROL SULFATE 3 ML: 2.5; .5 SOLUTION RESPIRATORY (INHALATION) at 19:29

## 2021-08-10 RX ADMIN — ANORECTAL OINTMENT: 15.7; .44; 24; 20.6 OINTMENT TOPICAL at 13:44

## 2021-08-10 RX ADMIN — IPRATROPIUM BROMIDE AND ALBUTEROL SULFATE 3 ML: 2.5; .5 SOLUTION RESPIRATORY (INHALATION) at 07:38

## 2021-08-10 RX ADMIN — VENLAFAXINE 37.5 MG: 37.5 TABLET ORAL at 08:15

## 2021-08-10 RX ADMIN — SODIUM CHLORIDE 30 MG/ML INHALATION SOLUTION 3 ML: 30 SOLUTION INHALANT at 19:29

## 2021-08-10 RX ADMIN — HEPARIN SODIUM 5000 UNITS: 5000 INJECTION, SOLUTION INTRAVENOUS; SUBCUTANEOUS at 13:45

## 2021-08-10 RX ADMIN — ACETYLCYSTEINE 2 ML: 200 SOLUTION ORAL; RESPIRATORY (INHALATION) at 07:38

## 2021-08-10 RX ADMIN — MIRTAZAPINE 7.5 MG: 7.5 TABLET ORAL at 21:41

## 2021-08-10 ASSESSMENT — MIFFLIN-ST. JEOR: SCORE: 1455.94

## 2021-08-10 NOTE — PROGRESS NOTES
Pulmonary Medicine Follow up Note - Ventilator Rounds:    Clinical status discussed today with RN and respiratory therapist..    Chief complaint: Ongoing respiratory failure  Significant change in clinical status during past 24 hours?   -  Tolerating trach mask 24 hours moderate trach secretions       Ventilation Mode: Trach collar  FiO2 (%): 30 %  Oxygen Concentration (%): 30 %  Resp: 24    Tracheal secretions: moderate secretions   Current phase of ventilator weaning pathway:  Phase 2  Ventilator weaning results from yesterday: 24 hours trach mask    Current Facility-Administered Medications   Medication     acetaminophen (TYLENOL) solution 650 mg    Or     acetaminophen (TYLENOL) tablet 650 mg     acetylcysteine (MUCOMYST) 20 % nebulizer solution 2 mL     alteplase (CATHFLO ACTIVASE) injection 2 mg     bisacodyl (DULCOLAX) Suppository 10 mg     dextrose 10% infusion     dextrose 50 % injection 25-50 mL    Or     glucagon injection 1 mg     docusate (COLACE) 50 MG/5ML liquid 100 mg    Or     docusate sodium (COLACE) capsule 100 mg     famotidine (PEPCID) tablet 20 mg     fiber modular (NUTRISOURCE FIBER) packet 3 packet     heparin ANTICOAGULANT injection 5,000 Units     hydrALAZINE (APRESOLINE) injection 10 mg     ipratropium - albuterol 0.5 mg/2.5 mg/3 mL (DUONEB) neb solution 3 mL     lisinopril (ZESTRIL) tablet 40 mg     loperamide (IMODIUM) liquid 1 mg     magnesium hydroxide (MILK OF MAGNESIA) suspension 30 mL     menthol-zinc oxide (CALMOSEPTINE) 0.44-20.6 % ointment OINT     methylphenidate (RITALIN) tablet 10 mg     mirtazapine (REMERON) tablet TABS 7.5 mg     naloxone (NARCAN) injection 0.2 mg    Or     naloxone (NARCAN) injection 0.4 mg    Or     naloxone (NARCAN) injection 0.2 mg    Or     naloxone (NARCAN) injection 0.4 mg     ondansetron (ZOFRAN) solution 4 mg     polyethylene glycol (MIRALAX) Packet 17 g     scopolamine (TRANSDERM) 72 hr patch 1 patch    And     scopolamine (TRANSDERM-SCOP) Patch in  "Place     silver nitrate (ARZOL) Misc     sodium chloride (NEBUSAL) 3 % neb solution 3 mL     sodium chloride 0.9% infusion     triamcinolone (KENALOG) 0.5 % ointment     venlafaxine (EFFEXOR) tablet 37.5 mg     Physical exam     /74 (BP Location: Left arm)   Pulse 96   Temp 98.1  F (36.7  C) (Axillary)   Resp 24   Ht 1.803 m (5' 10.98\")   Wt 66.4 kg (146 lb 6.4 oz)   SpO2 92%   BMI 20.43 kg/m      Gen: awake, poorly interactive, trach on trach mask  HEENT: pale conjunctiva, moist mucosa  Neck: s/p trach   Lungs: ronchi both HT  CV: regular, no murmurs or gallops appreciated  Abdomen: soft, NT, BS wnl  Ext: no edema  Neuro: awake, poor interaction, moves spontaneously upper and lower extremities    Sodium   Date Value Ref Range Status   08/09/2021 142 136 - 145 mmol/L Final   06/11/2021 143 133 - 144 mmol/L Final     Potassium   Date Value Ref Range Status   08/09/2021 4.5 3.5 - 5.0 mmol/L Final   06/11/2021 3.8 3.4 - 5.3 mmol/L Final     Chloride   Date Value Ref Range Status   08/09/2021 101 98 - 107 mmol/L Final   06/11/2021 106 94 - 109 mmol/L Final     Carbon Dioxide   Date Value Ref Range Status   06/11/2021 36 (H) 20 - 32 mmol/L Final     Carbon Dioxide (CO2)   Date Value Ref Range Status   08/09/2021 30 22 - 31 mmol/L Final     Anion Gap   Date Value Ref Range Status   08/09/2021 11 5 - 18 mmol/L Final   06/11/2021 2 (L) 3 - 14 mmol/L Final     Glucose   Date Value Ref Range Status   08/09/2021 118 70 - 125 mg/dL Final   06/11/2021 142 (H) 70 - 99 mg/dL Final     Urea Nitrogen   Date Value Ref Range Status   08/09/2021 42 (H) 8 - 22 mg/dL Final   06/11/2021 25 7 - 30 mg/dL Final     Creatinine   Date Value Ref Range Status   08/09/2021 0.82 0.70 - 1.30 mg/dL Final   06/11/2021 0.57 (L) 0.66 - 1.25 mg/dL Final     GFR Estimate   Date Value Ref Range Status   08/09/2021 >90 >60 mL/min/1.73m2 Final     Comment:     As of July 11, 2021, eGFR is calculated by the CKD-EPI creatinine equation, without " race adjustment. eGFR can be influenced by muscle mass, exercise, and diet. The reported eGFR is an estimation only and is only applicable if the renal function is stable.   07/09/2021 >60 >60 mL/min/1.73m2 Final   06/11/2021 >90 >60 mL/min/[1.73_m2] Final     Comment:     Non  GFR Calc  Starting 12/18/2018, serum creatinine based estimated GFR (eGFR) will be   calculated using the Chronic Kidney Disease Epidemiology Collaboration   (CKD-EPI) equation.       Calcium   Date Value Ref Range Status   08/09/2021 11.1 (H) 8.5 - 10.5 mg/dL Final   06/11/2021 9.6 8.5 - 10.1 mg/dL Final       XR CHEST PORT 1 VIEW  LOCATION: [facility]  DATE/TIME: 7/15/2021 7:50 AM  INDICATION: New VAP followup.  COMPARISON: 07/09/2021.  IMPRESSION:   No change in tracheostomy tube. Heart and mediastinal size are stable. There is indistinctness of the pulmonary interstitium, representing either airway inflammation or edema. There is bandlike opacity involving the retrocardiac region of the left lung base, increased from prior study and representing either atelectasis or infectious process. No pleural effusion or pneumothorax. There is some mild elevation of the right hemidiaphragm.    Diagnosis:     Patient is a 71 yo man with history of hypertension. Patient initially presented on 15-May-2021 to [facility] after being found unresponsive by his wife. Patient was then found to have a subarachnoid hemorrhage secondary to a ruptured aneurysm. Patient was intubated and was started on mannitol, IV antihypertensives and hyperventilation and patient was then transferred to [facility]. Patient underwent aneurysm clipping as well as placement of extra-ventricular drain from hydrocephalus on 15-May-2021. Extra-ventricular drain would malfunction and would require replacement on 04-Jun-2021 and 06-Jun-2021. Extra-ventricular drain reportedly was removed on 09-Jun-2021. Patient had  intra-arterial vasospasm treatment with verapamil on 28-May-2021.  Patient was extubated on 16-May-2021 but had to be reintubated on 19-May-2021. Patient would be extubated on 03-Jun-2021 but would be reintubated on 06-Jun-2021. Patient had tracheostomy and PEG-tube placed on 07-Jun-2021. Patient required treatment for hospital-acquired pneumonia. Patient was transferred to LTAC on 11-Jun-2021.    1. Acute respiratory failure s/p trach 6/7/2021  2. S/p treatment pseudomonas ESBL and klebsiella pneumonia   3. Encephalopathy   4. Subarachnoid bleed s/p craniotomy and clipping P-comm rupture aneurysm.   5. HTN  6. Malnourishment   7. Dysphagia s/p G tube    Recommendations/Plans (MDM):  1. Weaning orders: continue phase 2 weaning trial   2. Trach change? no  3. Diagnostic testing? no  4. Continue pulmonary toiletting, scheduled ipratopium/albuterol, saline and mucomyst  nebs  5. Scopolamine patch   6. DVT prophylaxis heparin SQ    Marcelino Navarro  Pulmonary / Critical Care  08/10/21   4:28 PM

## 2021-08-10 NOTE — PLAN OF CARE
Problem: Adult Inpatient Plan of Care  Goal: Optimal Comfort and Wellbeing  Outcome: Improving  Patient denied pain and appeared to be comfortable when up in the chair and in bed. Repositioned every 2 hours.      Problem: Anxiety  Goal: Anxiety Reduction or Resolution  Outcome: Improving  Patient's family was on the Ipad from 8:30 AM on. Patient appeared to now be anxious. Will continue with plan of care.

## 2021-08-10 NOTE — PLAN OF CARE
Plan of Care by Jacqueline Vieyra RN at 2021  9:23 AM     Author: Jacqueline Vieyra RN Service: -- Author Type: RN, Care Manager    Filed: 2021  9:33 AM Date of Service: 2021  9:23 AM Status: Signed    : Jacqueline Vieyra RN (RN, Care Manager)       Care Management Progression of Care Update        DR GLOS - Target D/C Date TBD        PLAN/GOALS  1. Pulmonary following. Phase 2 wean~continous heated trach mask with hi rosemarie 30% Fi02 tolerated PMV days.  Duo-neb, four times a day sodium chloride neb two times a day for  pulmonary hygiene.  Frequent suctioning 5-12x/shift.    2.  Nutrition management.  Tube feeding via PEG placed 21.  Registered dietician to monitor tube feeding tolerance, weight and labs.    3.  Neurology following intermittently.    4. PT/OT 5x/week.     5.  Speech therapy 5x/week. Continue effortful swallow exercises and voicing.    6.  Palliative following.           BARRIERS  1.  Acute hypoxic respiratory failure due to subarachnoid hemorrhage. Trach and oxygen wean.    2.  Traumatic brain injury/intracranial hemorrhage/acute ischemic stroke.    3.  Oropharyngeal dysphagia.    4.   Bilateral mitts for pulling at tubes and lines.    5.  Frequent suctioning of copious secretions.        Disposition:  TCU  Care Manager Name:  Jacqueline Vieyra RN,BSN, HNB-BC    Date/Time:  21 @ 8:02 AM

## 2021-08-10 NOTE — PROGRESS NOTES
[facility]    Medicine Progress Note - Hospitalist Service       Date of Admission:  6/11/2021    Summary:  Dayron Neri is a 70 year male with h/o HTN who presented to ED on 5/15/2021 with acute SAH after a fall with unresponsiveness.  He underwent emergent left external ventricular drain placement.  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  On 5/28/2021, patient had persistent cerebral vasospasm, CT head showed bilateral BAYLEE infarct and was treated with milrinone and verapamil.  On 6/1/2021, angiogram showed no evidence of vasospasm.  Extubated on 6/3/2021.  6/6/2021 he was reintubated due to hypoxia.  On 6/7/2021, patient underwent tracheostomy and PEG tube placement.  EVD removed 6/9/2021.   Patient was treated for possible pneumonia with Unasyn on 5/20/2021, It was switched to ceftriaxone the next day for sputum culture growing Klebsiella. On 5/23, sputum culture also grew Pseudomonas. Antibiotic was switched to Zosyn.  Due to increased WBCs in CSF, possibility of ventriculitis was raised. CSF culture was negative. Antibiotic was switched to cefepime and vancomycin on 5/28/2021 for 2 weeks.  Vanco was discontinued on 6/9/2021.  On 6/5/2021, sputum grew ESBL so cefepime was switched to meropenem. He was transferred to [facility] on 6/11/21.       8/10 :     Clinically stable  Respiratory status stable - on 30 liters  On Phase 2 wean~continous heated trach mask with hi rosemarie 30% Fi02 tolerated PMV days.    On Duo-neb, four times a day sodium chloride neb two times a day for  pulmonary hygiene.    Frequent suctioning 5-12x/shift.  Needing frequent suctioning 8-12x    Neuro status stable  Needing b/l mittens    Ionized calcium very mildly elevated at 1.35  PTH normal, vitamin D level mildly low, pending phosphorus and albumin levels.  Will check calcium levels in and d/w dietary team prn  No new issues noted  Continues  with Tube feeds, speech therapy following      Discussed with wife on phone and answered questions.    Barriers to discharge : still needing frequent suctioning and needing mittens        Assessment & Plan           Acute on chronic hypoxemic respiratory failure: S/p treatment pseudomonas ESBL and klebsiella pneumonia. Weaning has been slow and the biggest barrier for decannulation is excessive secretions  Glycopyrrolate was tried and stopped.  Also diuresed him with Lasix for 3 doses with some improvement however that improvement seems to have stabilized or plateaued.  Speaking valve is being attempted in short trials but does aspirate with blue dye test so needs to be only with speech/RT present.  Currently on phase 2 weaning with TM/PMV during day and is off the vent at night.   - Continue to wean per RT and pulmonary.   - Continue  metaneb, acetylcysteine, and 3% saline nebs   - Continue Scopolamine (started 7/20/2021)    Ventilator associated pneumonia: Completed antibiotics on July 18.    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 sputum culture 6/19 showed MDR pseudomonas. On Tobramycin nebs 6/24/2021 - 7/9/2021.   - Continue pulmonary toilet, scheduled duonebs    Traumatic brain injury/intracranial hemorrhage: 6/14/21 CT head done for fatigue and read as slight increase in caliber of lateral and third ventricle with possibility of developing communicating hydrocephalus. Discussed with Dr. Casillas (neurosurgeon at [facility]), who did not think there was much change. CT head repeated on 6/16/21, which did not show any change.  - Need follow up with Dr. Martinez with Neurosurgery in 3 months with repeat CTA of head and neck for post-op f/u.    Dysphagia: Patient failed the blue dye test 7/20/21 with immediate aspiration, continue to use tube feeds. Speech therapy following.     Acute metabolic encephalopathy: improved.   - Continue Ritalin and effexor      Anemia: hemoglobin stable. Continue to monitor.     Essential hypertension: BP controlled. On lisinopril    Severe debility, weakness, critical illness, deconditioning, Continue PT/OT    Hypercalcemia - unclear etiology, mild at this point. Parathyroid hormone normal. May be related to relative immobilization as he is walking with therapy but only 1-2 times a day. Monitor.    Insomnia: is on Ritalin which was decreased to once daily due to insomnia when taken later in day.    - Continue nighttime mirtazapine 7.5 mg scheduled.       Diet: Adult Formula Drip Feeding: Continuous Vital 1.5; Gastrostomy; Goal Rate: 90; mL/hr; Medication - Feeding Tube Flush Frequency: At least 15-30 mL water before and after medication administration and with tube clogging; Amount to Send (Nutrition us...    DVT Prophylaxis: Heparin SQ  Gutierrez Catheter: Not present  Central Lines: None  Code Status: Full Code      Disposition Plan   Expected discharge: to be determined    The patient's care was discussed with the Bedside Nurse and Care Coordinator/.    Isreal Granda MD  Hospitalist Service  [facility]  Securely message with the Vocera Web Console (learn more here)  Text page via [employer] Paging/Directory      ______________________________________________________________________      Data reviewed today: I reviewed all medications, new labs and imaging results over the last 24 hours.     Physical Exam   Vital Signs: Temp: 98.6  F (37  C) Temp src: Axillary BP: (!) 142/90 Pulse: 99   Resp: 24 SpO2: 95 % O2 Device: Trach dome Oxygen Delivery: 30 LPM  Weight: 146 lbs 6.4 oz    General appearance: not in acute distress  HEENT: PERRL, EOMI  Lungs: Clear breath sounds in bilateral lung fields  Cardiovascular: Regular rate and rhythm, normal S1-S2  Abdomen: Soft, non tender, no distension  Musculoskeletal: No joint swelling  Skin: No rash and no edema  Neurology: AAOx3, Cranial nerves II - XII normal. Moves bilateral  arms. Able to lift bilateral legs off the floor.    Data   Recent Labs   Lab 08/09/21  0615 08/06/21  0622 08/04/21  0616   WBC 8.0  --  9.3   HGB 10.7*  --  10.4*   MCV 96  --  97     --  321    139 141   POTASSIUM 4.5 4.7 4.2   CHLORIDE 101 99 101   CO2 30 31 32*   BUN 42* 37* 34*   CR 0.82 0.78 0.82   ANIONGAP 11 9 8   RAGINI 11.1* 10.6* 10.9*    111 114       Restraint:    Patient pulls his IV line and trach.     Within an hour after restraint an in person face to face assessment was completed at 9:30 AM, including an evaluation of the patient's immediate reaction to the intervention, behavioral assessment and review/assessment of history, drugs and medications, recent labs, etc., and behavioral condition.  The patient experienced: No adverse physical outcome from seclusion/restraint initiation.  The intervention of restraint or seclusion needs to continue.

## 2021-08-10 NOTE — PLAN OF CARE
Problem: Restraint for Non-Violent/Non-Self-Destructive Behavior  Goal: Prevent/Manage Potential Problems  Description: Maintain safety of patient and others during period of restraint.  Promote psychological and physical wellbeing.  Prevent injury to skin and involved body parts.  Promote nutrition, hydration, and elimination.  Outcome: No Change    Patient continue on mitt x 2 , for pulling on the tubes, all other  cares done.

## 2021-08-10 NOTE — PLAN OF CARE
"  Problem: Device-Related Complication Risk (Artificial Airway)  Goal: Optimal Device Function  Outcome: No Change     Problem: Skin and Tissue Injury (Artificial Airway)  Goal: Absence of Device-Related Skin or Tissue Injury  Outcome: No Change   RT PROGRESS NOTE     DATA:     CURRENT SETTINGS:             TRACH TYPE / SIZE:  #6 bivona changed 7/28             MODE:   30% and 30L tm with cuff inflated                 ACTION:             THERAPIES:   duo neb bid, saline bid, mucomyst bid, metaneb bid             SUCTION:                           FREQUENCY:   x6                        AMOUNT:  scant to copious                        CONSISTENCY:   thick                        COLOR:   white to clear              SPONTANEOUS COUGH EFFORT/STRENGTH OF EFFORT (not elicited by suctioning): moderate to strong                              WEANING PHASE:   2                        WEAN MODE:    pmv                        WEAN TIME:   started at 09:05                             RESPONSE:             BS:   diminished and coarse             VITAL SIGNS:   Blood pressure 133/74, pulse 97, temperature 98.1  F (36.7  C), temperature source Axillary, resp. rate 24, height 1.803 m (5' 10.98\"), weight 66.4 kg (146 lb 6.4 oz), SpO2 97 %.                 EMOTIONAL NEEDS / CONCERNS:  no                RISK FOR SELF DECANNULATION:  yes                        RISK DUE TO:  Confusion, impulsive                        INTERVENTION/S IN PLACE IS/ARE:  Bilateral mitts       NOTE / PLAN:   Continue pulmonary toitet.    "

## 2021-08-10 NOTE — PROGRESS NOTES
NUTRITION BRIEF NOTE     Chart check for nutrition support patient. Chart reviewed and discussed with CNP Flavia De Jesus.    Updates:  - Unable to administer Benecalorie via feeding tube (for PO intake only), for that reason I will need to switch pt. To a different formula with lower amounts of Ca in order to continue to meet protein and calorie needs via G tube.  - Ca intake from Vital 1.5 was 2800 mg/day  - Ca intake from KF Peptide will be 2016 mg Ca  - Will be switching pt. To different TF formula (Desecuritrex Peptide 1.5) with lower amounts of Ca compared to previous formula Vital 1.5 per MD and CNP.    Recommend TF as follows:   Type of Feeding Tube: Gastrostomy  Enteral Frequency:  Continuous  Enteral Regimen: Desecuritrex Peptide 1.5 at 90 mL/hr  Total Enteral Provisions: 2160 mL daily provides 3240 kcal, 160g protein, 260g CHO, 17g fiber and 1512 mL free water. Meets 100% of DRI's.  Free Water Flush: 180 mL q4 hours - same as current order  TF + fluid flush = 2592 mL per day  Total Ca from TF = 2016 mg    Daily electrolyte check, Mg and Phos add on  Daily weights  Start at 50 mL/hr, increase by 20 mL q10 hours until goal rate reached    Will continue to follow per protocol. Please page/consult as needed.     Dulce Gonzales RDN, LD  Clinical Dietitian

## 2021-08-10 NOTE — PLAN OF CARE
Problem: Device-Related Complication Risk (Artificial Airway)  Goal: Optimal Device Function  Outcome: Improving     Problem: Skin and Tissue Injury (Artificial Airway)  Goal: Absence of Device-Related Skin or Tissue Injury  Outcome: Improving  RT PROGRESS NOTE     DATA:     CURRENT SETTINGS:               TRACH TYPE / SIZE: #6 Bivona (Cuff up, placed 07/28)             MODE:  TM             FIO2:  30%, 30 L     ACTION:             THERAPIES: Duo-neb BID/Saline BID/Mucomyst BID/Higden-neb BID             SUCTION:  Yes                         FREQUENCY:  12x Plus                        AMOUNT:   Moderate to Large to Copious                        CONSISTENCY:  Thick                        COLOR:   White              SPONTANEOUS COUGH EFFORT/STRENGTH OF EFFORT (not elicited by suctioning): Strong productive cough.                              WEANING PHASE:   2                        WEAN MODE:  TM                        WEAN TIME:  Continuously as reji.                        END WEAN REASON:        RESPONSE:             BS:   Coarse             VITAL SIGNS: Sat 93-97%, HR 84-96, RR 20-24             EMOTIONAL NEEDS / CONCERNS:  None                RISK FOR SELF DECANNULATION:  Yes                        RISK DUE TO: Confusion                        INTERVENTION/S IN PLACE IS/ARE: Mitts x2       NOTE / PLAN:     Pt remains on TM and tolerating well with no distress.  Pt has an increased secretions and required frequent suctions.  Continue current care plan.

## 2021-08-10 NOTE — PROGRESS NOTES
Social Work Note:  Patient chart reviewed.  Patient discussed in morning rounds.  Barriers to discharge: frequent suction 8-12x.  Bi-lat mittens.  Phase 2, TM, 30% 30lpm.     Patient will need placement after discharge from LTACH.  Patient can not discharge to SNF/TCU with mitts x2 in place. Patient suctioning will need to be no more than 3-5 a shift to discharge to a SNF/TCU.      Dayron Chen, [facility] Care[facility] KATE  [facility]  [phone]

## 2021-08-10 NOTE — PLAN OF CARE
Problem: Adult Inpatient Plan of Care  Goal: Plan of Care Review  Outcome: No Change     Problem: Restraint for Non-Violent/Non-Self-Destructive Behavior  Goal: Prevent/Manage Potential Problems  Description: Maintain safety of patient and others during period of restraint.  Promote psychological and physical wellbeing.  Prevent injury to skin and involved body parts.  Promote nutrition, hydration, and elimination.  Outcome: No Change     Problem: Adult Inpatient Plan of Care  Goal: Absence of Hospital-Acquired Illness or Injury  Intervention: Identify and Manage Fall Risk  Recent Flowsheet Documentation  Taken 8/9/2021 1615 by Mariam Robles RN  Safety Promotion/Fall Prevention:   bed alarm on   room door open  Intervention: Prevent and Manage VTE (Venous Thromboembolism) Risk  Recent Flowsheet Documentation  Taken 8/9/2021 1615 by Mariam Robles RN  VTE Prevention/Management: anticoagulant therapy maintained     Pt alert and oriented to self. Vital signs stable. Pt denied pain. PRN Tylenol given ar 1835 for temperature of 99.4F. Temperature was 99.0F at 1952.  All care needs met.

## 2021-08-11 ENCOUNTER — APPOINTMENT (OUTPATIENT)
Dept: OCCUPATIONAL THERAPY | Facility: CLINIC | Age: 71
DRG: 207 | End: 2021-08-11
Attending: HOSPITALIST
Payer: COMMERCIAL

## 2021-08-11 ENCOUNTER — APPOINTMENT (OUTPATIENT)
Dept: SPEECH THERAPY | Facility: CLINIC | Age: 71
DRG: 207 | End: 2021-08-11
Attending: HOSPITALIST
Payer: COMMERCIAL

## 2021-08-11 ENCOUNTER — APPOINTMENT (OUTPATIENT)
Dept: PHYSICAL THERAPY | Facility: CLINIC | Age: 71
DRG: 207 | End: 2021-08-11
Attending: HOSPITALIST
Payer: COMMERCIAL

## 2021-08-11 LAB
ALBUMIN SERPL-MCNC: 2.9 G/DL (ref 3.5–5)
ANION GAP SERPL CALCULATED.3IONS-SCNC: 8 MMOL/L (ref 5–18)
BUN SERPL-MCNC: 43 MG/DL (ref 8–22)
CALCIUM SERPL-MCNC: 11.2 MG/DL (ref 8.5–10.5)
CALCIUM, IONIZED MEASURED: 1.41 MMOL/L (ref 1.11–1.3)
CHLORIDE BLD-SCNC: 103 MMOL/L (ref 98–107)
CO2 SERPL-SCNC: 32 MMOL/L (ref 22–31)
CREAT SERPL-MCNC: 0.88 MG/DL (ref 0.7–1.3)
GFR SERPL CREATININE-BSD FRML MDRD: 88 ML/MIN/1.73M2
GLUCOSE BLD-MCNC: 115 MG/DL (ref 70–125)
ION CA PH 7.4: 1.42 MMOL/L (ref 1.11–1.3)
PH: 7.42 (ref 7.35–7.45)
PHOSPHATE SERPL-MCNC: 4.3 MG/DL (ref 2.5–4.5)
POTASSIUM BLD-SCNC: 4.4 MMOL/L (ref 3.5–5)
PTH-INTACT SERPL-MCNC: 26 PG/ML (ref 10–86)
SODIUM SERPL-SCNC: 143 MMOL/L (ref 136–145)

## 2021-08-11 PROCEDURE — 97530 THERAPEUTIC ACTIVITIES: CPT | Mod: GO | Performed by: OCCUPATIONAL THERAPIST

## 2021-08-11 PROCEDURE — 97110 THERAPEUTIC EXERCISES: CPT | Mod: GO | Performed by: OCCUPATIONAL THERAPIST

## 2021-08-11 PROCEDURE — 999N000009 HC STATISTIC AIRWAY CARE

## 2021-08-11 PROCEDURE — 97112 NEUROMUSCULAR REEDUCATION: CPT | Mod: GP | Performed by: PHYSICAL THERAPIST

## 2021-08-11 PROCEDURE — 120N000017 HC R&B RESPIRATORY CARE

## 2021-08-11 PROCEDURE — 250N000011 HC RX IP 250 OP 636

## 2021-08-11 PROCEDURE — 250N000013 HC RX MED GY IP 250 OP 250 PS 637: Performed by: INTERNAL MEDICINE

## 2021-08-11 PROCEDURE — 94003 VENT MGMT INPAT SUBQ DAY: CPT | Performed by: INTERNAL MEDICINE

## 2021-08-11 PROCEDURE — 999N000157 HC STATISTIC RCP TIME EA 10 MIN

## 2021-08-11 PROCEDURE — 83970 ASSAY OF PARATHORMONE: CPT | Performed by: NURSE PRACTITIONER

## 2021-08-11 PROCEDURE — 94640 AIRWAY INHALATION TREATMENT: CPT

## 2021-08-11 PROCEDURE — 82040 ASSAY OF SERUM ALBUMIN: CPT | Performed by: INTERNAL MEDICINE

## 2021-08-11 PROCEDURE — 250N000013 HC RX MED GY IP 250 OP 250 PS 637

## 2021-08-11 PROCEDURE — 97530 THERAPEUTIC ACTIVITIES: CPT | Mod: GP | Performed by: PHYSICAL THERAPIST

## 2021-08-11 PROCEDURE — 99207 PR CDG-CODE CATEGORY CHANGED: CPT | Performed by: INTERNAL MEDICINE

## 2021-08-11 PROCEDURE — 250N000009 HC RX 250: Performed by: INTERNAL MEDICINE

## 2021-08-11 PROCEDURE — 94668 MNPJ CHEST WALL SBSQ: CPT

## 2021-08-11 PROCEDURE — 94660 CPAP INITIATION&MGMT: CPT

## 2021-08-11 PROCEDURE — 82330 ASSAY OF CALCIUM: CPT | Performed by: INTERNAL MEDICINE

## 2021-08-11 PROCEDURE — 99233 SBSQ HOSP IP/OBS HIGH 50: CPT | Performed by: INTERNAL MEDICINE

## 2021-08-11 PROCEDURE — 92507 TX SP LANG VOICE COMM INDIV: CPT | Mod: GN

## 2021-08-11 PROCEDURE — 36415 COLL VENOUS BLD VENIPUNCTURE: CPT | Performed by: INTERNAL MEDICINE

## 2021-08-11 PROCEDURE — 272N000270 HC CIRCUIT, METANEB

## 2021-08-11 PROCEDURE — 999N000123 HC STATISTIC OXYGEN O2DAILY TECH TIME

## 2021-08-11 PROCEDURE — 94640 AIRWAY INHALATION TREATMENT: CPT | Mod: 76

## 2021-08-11 PROCEDURE — 92526 ORAL FUNCTION THERAPY: CPT | Mod: GN

## 2021-08-11 RX ADMIN — VENLAFAXINE 37.5 MG: 37.5 TABLET ORAL at 08:29

## 2021-08-11 RX ADMIN — TRIAMCINOLONE ACETONIDE: 5 OINTMENT TOPICAL at 08:42

## 2021-08-11 RX ADMIN — SODIUM CHLORIDE 30 MG/ML INHALATION SOLUTION 3 ML: 30 SOLUTION INHALANT at 08:05

## 2021-08-11 RX ADMIN — Medication 3 PACKET: at 21:07

## 2021-08-11 RX ADMIN — ANORECTAL OINTMENT: 15.7; .44; 24; 20.6 OINTMENT TOPICAL at 21:12

## 2021-08-11 RX ADMIN — ACETYLCYSTEINE 2 ML: 200 SOLUTION ORAL; RESPIRATORY (INHALATION) at 19:08

## 2021-08-11 RX ADMIN — HEPARIN SODIUM 5000 UNITS: 5000 INJECTION, SOLUTION INTRAVENOUS; SUBCUTANEOUS at 14:23

## 2021-08-11 RX ADMIN — VENLAFAXINE 37.5 MG: 37.5 TABLET ORAL at 16:54

## 2021-08-11 RX ADMIN — ANORECTAL OINTMENT: 15.7; .44; 24; 20.6 OINTMENT TOPICAL at 14:23

## 2021-08-11 RX ADMIN — METHYLPHENIDATE HYDROCHLORIDE 10 MG: 10 TABLET ORAL at 05:32

## 2021-08-11 RX ADMIN — IPRATROPIUM BROMIDE AND ALBUTEROL SULFATE 3 ML: 2.5; .5 SOLUTION RESPIRATORY (INHALATION) at 19:09

## 2021-08-11 RX ADMIN — HEPARIN SODIUM 5000 UNITS: 5000 INJECTION, SOLUTION INTRAVENOUS; SUBCUTANEOUS at 21:07

## 2021-08-11 RX ADMIN — FAMOTIDINE 20 MG: 20 TABLET, FILM COATED ORAL at 21:07

## 2021-08-11 RX ADMIN — VENLAFAXINE 37.5 MG: 37.5 TABLET ORAL at 12:30

## 2021-08-11 RX ADMIN — HEPARIN SODIUM 5000 UNITS: 5000 INJECTION, SOLUTION INTRAVENOUS; SUBCUTANEOUS at 05:32

## 2021-08-11 RX ADMIN — Medication 3 PACKET: at 09:28

## 2021-08-11 RX ADMIN — Medication 3 PACKET: at 14:24

## 2021-08-11 RX ADMIN — ANORECTAL OINTMENT: 15.7; .44; 24; 20.6 OINTMENT TOPICAL at 08:41

## 2021-08-11 RX ADMIN — ACETYLCYSTEINE 2 ML: 200 SOLUTION ORAL; RESPIRATORY (INHALATION) at 07:55

## 2021-08-11 RX ADMIN — SODIUM CHLORIDE 30 MG/ML INHALATION SOLUTION 3 ML: 30 SOLUTION INHALANT at 19:08

## 2021-08-11 RX ADMIN — FAMOTIDINE 20 MG: 20 TABLET, FILM COATED ORAL at 08:29

## 2021-08-11 RX ADMIN — MIRTAZAPINE 7.5 MG: 7.5 TABLET ORAL at 21:07

## 2021-08-11 RX ADMIN — IPRATROPIUM BROMIDE AND ALBUTEROL SULFATE 3 ML: 2.5; .5 SOLUTION RESPIRATORY (INHALATION) at 07:55

## 2021-08-11 ASSESSMENT — MIFFLIN-ST. JEOR: SCORE: 1463.19

## 2021-08-11 NOTE — PLAN OF CARE
Pt. Is alert, but disoriented to time and situation. He has mitt restraints X2. He was up in chair over 6 hr.     Suctioned PRN with clear/yellow secretions. Pt. Has productive cough and can sometimes cough up his secretions.     Pt. Did not have a BM today. He has a condom catheter.     Rash on face looks to be clearing up.

## 2021-08-11 NOTE — PLAN OF CARE
Problem: Adult Inpatient Plan of Care  Goal: Plan of Care Review  Outcome: No Change     Problem: Restraint for Non-Violent/Non-Self-Destructive Behavior  Goal: Prevent/Manage Potential Problems  Description: Maintain safety of patient and others during period of restraint.  Promote psychological and physical wellbeing.  Prevent injury to skin and involved body parts.  Promote nutrition, hydration, and elimination.  Outcome: No Change     Problem: Adult Inpatient Plan of Care  Goal: Absence of Hospital-Acquired Illness or Injury  Intervention: Identify and Manage Fall Risk  Recent Flowsheet Documentation  Taken 8/10/2021 1616 by Mariam Robles RN  Safety Promotion/Fall Prevention: bed alarm on  Intervention: Prevent Skin Injury  Recent Flowsheet Documentation  Taken 8/10/2021 2213 by Mariam Robles RN  Body Position:   turned   left     Problem: Ventilator-Induced Lung Injury (Mechanical Ventilation, Invasive)  Goal: Absence of Ventilator-Induced Lung Injury  Intervention: Prevent Ventilator-Associated Pneumonia  Recent Flowsheet Documentation  Taken 8/10/2021 2213 by Mariam Robles RN  Head of Bed (HOB) Positioning: HOB at 30 degrees     Vital signs stable. Pt alert and oriented to self. Pt denied pain. No PRNs given.  Pt had one large, loose BM.  All care needs met.

## 2021-08-11 NOTE — PROGRESS NOTES
CLINICAL NUTRITION SERVICES - REASSESSMENT NOTE      Recommendations Ordered by Registered Dietitian (RD):   Continue current TF regimen as ordered  Continue to monitor for changes in Ca   Malnutrition:   % Weight Loss:  None noted  % Intake:  No decreased intake noted  Subcutaneous Fat Loss:  Orbital region severe depletion and Upper arm region severe depletion  Muscle Loss:  Temporal region severe depletion, Clavicle bone region severe depletion, Acromion bone region severe depletion, Anterior thigh region severe depletion and Posterior calf region severe depletion  Fluid Retention:  None noted    Malnutrition Diagnosis: Severe malnutrition  In Context of:  Acute illness or injury     EVALUATION OF PROGRESS TOWARD GOALS   Diet:  None    Nutrition Support:  New formula started yesterday  Adult Formula Swarmforce Peptide 1.5    Route Gastrostomy    Goal Rate 90    Goal Units mL/hr    Medication - Feeding Tube Flush Frequency At least 15-30 mL water before and after medication administration and with tube clogging    Amount to Send (Nutrition use) 6 cans/day    Advancement comment: Start at 50 mL/hr, increase by 20 mL q10 hrs until goal rate reached      Free Water Route Gastric    Free Water - Feeding Tube Flush Frequency Q 4 Hours    Free Water Volume Other    Specify 180      Intake/Tolerance:  Loose BM yesterday evening, previously pt. Had regular BM's    NEW FINDINGS:   - pt. Tolerating new TF formula    Labs:  Electrolytes  Potassium (mmol/L)   Date Value   08/11/2021 4.4   08/09/2021 4.5   08/06/2021 4.7   06/11/2021 3.8   06/10/2021 3.7   06/09/2021 3.8     Phosphorus (mg/dL)   Date Value   08/11/2021 4.3   06/11/2021 2.9   06/10/2021 2.5   06/09/2021 2.5   06/08/2021 2.3 (L)   06/07/2021 2.9    Blood Glucose  Glucose (mg/dL)   Date Value   08/11/2021 115   08/09/2021 118   08/06/2021 111   08/04/2021 114   08/02/2021 123   06/11/2021 142 (H)   06/10/2021 134 (H)   06/09/2021 117 (H)   06/08/2021 125 (H)    06/07/2021 103 (H)     GLUCOSE BY METER POCT (mg/dL)   Date Value   08/10/2021 108     Hemoglobin A1C (%)   Date Value   05/15/2021 5.5    Inflammatory Markers  CRP Inflammation (mg/L)   Date Value   06/01/2021 79.0 (H)   05/30/2021 120.0 (H)   05/28/2021 150.0 (H)     WBC (10e9/L)   Date Value   06/11/2021 8.2   06/10/2021 9.4   06/09/2021 10.4     WBC Count (10e3/uL)   Date Value   08/09/2021 8.0   08/04/2021 9.3   08/02/2021 8.0     Albumin (g/dL)   Date Value   08/11/2021 2.9 (L)   06/14/2021 2.1 (L)   06/02/2021 1.7 (L)   05/26/2021 2.1 (L)   05/23/2021 1.9 (L)      Magnesium (mg/dL)   Date Value   06/11/2021 2.5 (H)   06/10/2021 2.4 (H)   06/09/2021 2.4 (H)     Sodium (mmol/L)   Date Value   08/11/2021 143   08/09/2021 142   08/06/2021 139   06/11/2021 143   06/10/2021 142   06/09/2021 143    Renal  Urea Nitrogen (mg/dL)   Date Value   08/11/2021 43 (H)   08/09/2021 42 (H)   08/06/2021 37 (H)   06/11/2021 25   06/10/2021 23   06/09/2021 24     Creatinine (mg/dL)   Date Value   08/11/2021 0.88   08/09/2021 0.82   08/06/2021 0.78   06/11/2021 0.57 (L)   06/10/2021 0.52 (L)   06/09/2021 0.49 (L)     Additional  Triglycerides (mg/dL)   Date Value   05/28/2021 91   06/05/2013 178 (H)   09/19/2007 109     Ketones Urine   Date Value   07/01/2021 Negative   06/01/2021 Negative mg/dL        Previous Goals:   Maintain current weight 67 kg +/- 1-2 kg  Evaluation: Met    <2 loose BM's per day  Evaluation: Met    Previous Nutrition Diagnosis:   Predicted suboptimal nutrient intake related to swallowing difficulty and prolonged hospitalization as evidenced by severe muscle loss, mild fat loss.  Evaluation: No change    CURRENT NUTRITION DIAGNOSIS  Predicted excessive nutrient intake related to 2800 mg Ca/day from TF as evidenced by elevated blood Ca level 11.2 mg/dL    INTERVENTIONS  Recommendations / Nutrition Prescription  Continue current TF regimen as ordered  Continue to monitor for changes in  Ca    Implementation  EN Composition, EN Schedule and Feeding Tube Flush    Goals  Tolerate new TF formula  Maintain current weight 67kg +/- 1-2 kg    MONITORING AND EVALUATION:  Progress towards goals will be monitored and evaluated per protocol and Practice Guidelines    Dulce Gonzales RDN, LD  Clinical Dietitian

## 2021-08-11 NOTE — PLAN OF CARE
Problem: Device-Related Complication Risk (Artificial Airway)  Goal: Optimal Device Function  Outcome: Improving     Problem: Skin and Tissue Injury (Artificial Airway)  Goal: Absence of Device-Related Skin or Tissue Injury  Outcome: Improving  RT PROGRESS NOTE     DATA:     CURRENT SETTINGS:             TRACH TYPE / SIZE: #6 Bivona (Cuff up, placed 07/28)             MODE:  TM             FIO2:   30%, 30 L     ACTION:             THERAPIES: Duo-neb BID/Mucomyst BID/Saline BID/MetaNeb BID             SUCTION: Yes                         FREQUENCY: 5x                        AMOUNT:   Small x1 to Moderate x3 to Large x1                        CONSISTENCY: Thick                        COLOR:   White             SPONTANEOUS COUGH EFFORT/STRENGTH OF EFFORT (not elicited by suctioning): Strong productive cough.                              WEANING PHASE:   2                        WEAN MODE:  TM with PMV                        WEAN TIME:   14 hours and 19 minutes                        END WEAN REASON: Cuff up at noc     RESPONSE:             BS:   Coarse             VITAL SIGNS: Sat 95-97%, HR 82-95, RR 20-23             EMOTIONAL NEEDS / CONCERNS:  None                 RISK FOR SELF DECANNULATION:  Yes                        RISK DUE TO:  Confusion                        INTERVENTION/S IN PLACE IS/ARE: Mitts x2       NOTE / PLAN:     Pt remains on TM and tolerating well with no distress.  Continue current care plan.

## 2021-08-11 NOTE — PLAN OF CARE
"  Problem: Device-Related Complication Risk (Artificial Airway)  Goal: Optimal Device Function  Outcome: No Change     Problem: Skin and Tissue Injury (Artificial Airway)  Goal: Absence of Device-Related Skin or Tissue Injury  Outcome: No Change     Problem: Communication Impairment (Artificial Airway)  Goal: Effective Communication  Outcome: Improving   RT PROGRESS NOTE     DATA:     CURRENT SETTINGS:             TRACH TYPE / SIZE:  #6 bivona changed 7/28             MODE:   30L and 30% tm with cuff inflated                 ACTION:             THERAPIES:   albuterol bid, saline bid, mucomyst bid, metaneb bid             SUCTION:                           FREQUENCY:   x5                        AMOUNT:   small to copious                        CONSISTENCY:   thick                        COLOR:   white             SPONTANEOUS COUGH EFFORT/STRENGTH OF EFFORT (not elicited by suctioning): moderate                              WEANING PHASE:   2                        WEAN MODE:    pmv                        WEAN TIME:   started at 08:20                             RESPONSE:             BS:   clear and diminished             VITAL SIGNS:   Blood pressure (!) 145/71, pulse 96, temperature 97.5  F (36.4  C), temperature source Oral, resp. rate 22, height 1.803 m (5' 10.98\"), weight 67.1 kg (148 lb), SpO2 96 %.               EMOTIONAL NEEDS / CONCERNS:  no                RISK FOR SELF DECANNULATION:  yes                        RISK DUE TO:  Impulsive confustion                        INTERVENTION/S IN PLACE IS/ARE:  Bilateral mitts       NOTE / PLAN:   Continue pulmonary toilet.    "

## 2021-08-11 NOTE — PLAN OF CARE
Problem: Adult Inpatient Plan of Care  Goal: Absence of Hospital-Acquired Illness or Injury  Intervention: Identify and Manage Fall Risk  Recent Flowsheet Documentation  Taken 8/11/2021 0000 by Lilly Woodard RN  Safety Promotion/Fall Prevention: bed alarm on  Intervention: Prevent Skin Injury  Recent Flowsheet Documentation  Taken 8/11/2021 0642 by Lilly Woodard RN  Body Position:   left   turned  Intervention: Prevent Infection  Recent Flowsheet Documentation  Taken 8/11/2021 0000 by Lilly Woodard RN  Infection Prevention: cohorting utilized     Problem: Device-Related Complication Risk (Mechanical Ventilation, Invasive)  Goal: Optimal Device Function  Intervention: Optimize Device Care and Function  Recent Flowsheet Documentation  Taken 8/11/2021 0000 by Lilly Woodard RN  Airway Safety Measures:   all equipment/monitors on and audible   suction at bedside   suction equipment     Problem: Ventilator-Induced Lung Injury (Mechanical Ventilation, Invasive)  Goal: Absence of Ventilator-Induced Lung Injury  Intervention: Prevent Ventilator-Associated Pneumonia  Recent Flowsheet Documentation  Taken 8/11/2021 0642 by Lilly Woodard RN  Head of Bed (HOB) Positioning: HOB at 30 degrees     Problem: Device-Related Complication Risk (Artificial Airway)  Goal: Optimal Device Function  Intervention: Optimize Device Care and Function  Recent Flowsheet Documentation  Taken 8/11/2021 0000 by Lilly Woodard RN  Airway Safety Measures:   all equipment/monitors on and audible   suction at bedside   suction equipment  Problem: Adult Inpatient Plan of Care  Goal: Plan of Care Review  Outcome: Improving     Problem: Restraint for Non-Violent/Non-Self-Destructive Behavior  Goal: Prevent/Manage Potential Problems  Description: Maintain safety of patient and others during period of restraint.  Promote psychological and physical wellbeing.  Prevent injury to skin and involved body parts.  Promote nutrition,  hydration, and elimination.  Outcome: Improving     Problem: Skin and Tissue Injury (Artificial Airway)  Goal: Absence of Device-Related Skin or Tissue Injury  Outcome: Improving   Vitals stable. No signs of pain or discomfort this shift. Tolerated cares and repositioning. Replaced condom cath. No new skin issues  noted.Patient slept all night.

## 2021-08-11 NOTE — PROGRESS NOTES
[facility]    Medicine Progress Note - Hospitalist Service       Date of Admission:  6/11/2021    Summary:  Dayron Neri is a 70 year male with h/o HTN who presented to ED on 5/15/2021 with acute SAH after a fall with unresponsiveness.  He underwent emergent left external ventricular drain placement.  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  On 5/28/2021, patient had persistent cerebral vasospasm, CT head showed bilateral BAYLEE infarct and was treated with milrinone and verapamil.  On 6/1/2021, angiogram showed no evidence of vasospasm.  Extubated on 6/3/2021.  6/6/2021 he was reintubated due to hypoxia.  On 6/7/2021, patient underwent tracheostomy and PEG tube placement.  EVD removed 6/9/2021.   Patient was treated for possible pneumonia with Unasyn on 5/20/2021, It was switched to ceftriaxone the next day for sputum culture growing Klebsiella. On 5/23, sputum culture also grew Pseudomonas. Antibiotic was switched to Zosyn.  Due to increased WBCs in CSF, possibility of ventriculitis was raised. CSF culture was negative. Antibiotic was switched to cefepime and vancomycin on 5/28/2021 for 2 weeks.  Vanco was discontinued on 6/9/2021.  On 6/5/2021, sputum grew ESBL so cefepime was switched to meropenem. He was transferred to [facility] on 6/11/21.       8/11 :     Clinically stable  Respiratory status stable - on 30 liters  On Phase 2 wean~continous heated trach mask with hi rosemarie 30% Fi02 tolerated PMV days.    On Duo-neb, four times a day sodium chloride neb two times a day for  pulmonary hygiene.    Frequent suctioning 5-12x/shift.  Needing frequent suctioning 8-12x    Neuro status stable  Needing b/l mittens    Ionized calcium very mildly elevated at 1.35  PTH normal, vitamin D level mildly low, pending phosphorus and albumin levels.  Will check calcium levels in and d/w dietary team prn  No new issues noted  Continues  with Tube feeds, speech therapy following      Discussed with wife on phone and answered questions.    Barriers to discharge : still needing frequent suctioning and needing mittens        Assessment & Plan           Acute on chronic hypoxemic respiratory failure: S/p treatment pseudomonas ESBL and klebsiella pneumonia. Weaning has been slow and the biggest barrier for decannulation is excessive secretions  Glycopyrrolate was tried and stopped.  Also diuresed him with Lasix for 3 doses with some improvement however that improvement seems to have stabilized or plateaued.  Speaking valve is being attempted in short trials but does aspirate with blue dye test so needs to be only with speech/RT present.  Currently on phase 2 weaning with TM/PMV during day and is off the vent at night.   - Continue to wean per RT and pulmonary.   - Continue  metaneb, acetylcysteine, and 3% saline nebs   - Continue Scopolamine (started 7/20/2021)    Ventilator associated pneumonia: Completed antibiotics on July 18.    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 sputum culture 6/19 showed MDR pseudomonas. On Tobramycin nebs 6/24/2021 - 7/9/2021.   - Continue pulmonary toilet, scheduled duonebs    Traumatic brain injury/intracranial hemorrhage: 6/14/21 CT head done for fatigue and read as slight increase in caliber of lateral and third ventricle with possibility of developing communicating hydrocephalus. Discussed with Dr. Casillas (neurosurgeon at [facility]), who did not think there was much change. CT head repeated on 6/16/21, which did not show any change.  - Need follow up with Dr. Martinez with Neurosurgery in 3 months with repeat CTA of head and neck for post-op f/u.    Dysphagia: Patient failed the blue dye test 7/20/21 with immediate aspiration, continue to use tube feeds. Speech therapy following.     Acute metabolic encephalopathy: improved.   - Continue Ritalin and effexor      Anemia: hemoglobin stable. Continue to monitor.     Essential hypertension: BP controlled. On lisinopril    Severe debility, weakness, critical illness, deconditioning, Continue PT/OT    Hypercalcemia - unclear etiology, mild at this point. Parathyroid hormone normal. May be related to relative immobilization as he is walking with therapy but only 1-2 times a day. Monitor.    Insomnia: is on Ritalin which was decreased to once daily due to insomnia when taken later in day.    - Continue nighttime mirtazapine 7.5 mg scheduled.       Diet: Adult Formula Drip Feeding: Continuous [employer] Peptide 1.5; Gastrostomy; Goal Rate: 90; mL/hr; Medication - Feeding Tube Flush Frequency: At least 15-30 mL water before and after medication administration and with tube clogging; Amount to Send ...    DVT Prophylaxis: Heparin SQ  Gutierrez Catheter: Not present  Central Lines: None  Code Status: Full Code      Disposition Plan   Expected discharge: to be determined    The patient's care was discussed with the Bedside Nurse and Care Coordinator/.    Irseal Granda MD  Hospitalist Service  [facility]  Securely message with the Vocera Web Console (learn more here)  Text page via [employer] Paging/Directory      ______________________________________________________________________      Data reviewed today: I reviewed all medications, new labs and imaging results over the last 24 hours.     Physical Exam   Vital Signs: Temp: 98.3  F (36.8  C) Temp src: Oral BP: 109/73 Pulse: 103   Resp: 24 SpO2: 95 % O2 Device: Trach dome Oxygen Delivery: 30 LPM  Weight: 148 lbs 0 oz    General appearance: not in acute distress  HEENT: PERRL, EOMI  Lungs: Clear breath sounds in bilateral lung fields  Cardiovascular: Regular rate and rhythm, normal S1-S2  Abdomen: Soft, non tender, no distension  Musculoskeletal: No joint swelling  Skin: No rash and no edema  Neurology: AAOx3, Cranial nerves II - XII normal. Moves bilateral arms. Able  to lift bilateral legs off the floor.    Data   Recent Labs   Lab 08/11/21  0610 08/10/21  2030 08/09/21  0615 08/06/21  0622   WBC  --   --  8.0  --    HGB  --   --  10.7*  --    MCV  --   --  96  --    PLT  --   --  289  --      --  142 139   POTASSIUM 4.4  --  4.5 4.7   CHLORIDE 103  --  101 99   CO2 32*  --  30 31   BUN 43*  --  42* 37*   CR 0.88  --  0.82 0.78   ANIONGAP 8  --  11 9   RAGINI 11.2*  --  11.1* 10.6*    108 118 111   ALBUMIN 2.9*  --   --   --        Restraint:    Patient pulls his IV line and trach.     Within an hour after restraint an in person face to face assessment was completed at 9:30 AM, including an evaluation of the patient's immediate reaction to the intervention, behavioral assessment and review/assessment of history, drugs and medications, recent labs, etc., and behavioral condition.  The patient experienced: No adverse physical outcome from seclusion/restraint initiation.  The intervention of restraint or seclusion needs to continue.

## 2021-08-11 NOTE — PROGRESS NOTES
Pulmonary Medicine Follow up Note - Ventilator Rounds:    Clinical status discussed today with RN and respiratory therapist..    Chief complaint: Ongoing respiratory failure  Significant change in clinical status during past 24 hours?   -  Tolerating trach mask 24 hours moderate trach secretions       FiO2 (%): 30 %  Resp: 22    Tracheal secretions: moderate secretions   Current phase of ventilator weaning pathway:  Phase 2  Ventilator weaning results from yesterday: 24 hours trach mask    Current Facility-Administered Medications   Medication     acetaminophen (TYLENOL) solution 650 mg    Or     acetaminophen (TYLENOL) tablet 650 mg     acetylcysteine (MUCOMYST) 20 % nebulizer solution 2 mL     alteplase (CATHFLO ACTIVASE) injection 2 mg     bisacodyl (DULCOLAX) Suppository 10 mg     dextrose 10% infusion     dextrose 50 % injection 25-50 mL    Or     glucagon injection 1 mg     docusate (COLACE) 50 MG/5ML liquid 100 mg    Or     docusate sodium (COLACE) capsule 100 mg     famotidine (PEPCID) tablet 20 mg     fiber modular (NUTRISOURCE FIBER) packet 3 packet     heparin ANTICOAGULANT injection 5,000 Units     hydrALAZINE (APRESOLINE) injection 10 mg     ipratropium - albuterol 0.5 mg/2.5 mg/3 mL (DUONEB) neb solution 3 mL     lisinopril (ZESTRIL) tablet 40 mg     loperamide (IMODIUM) liquid 1 mg     magnesium hydroxide (MILK OF MAGNESIA) suspension 30 mL     menthol-zinc oxide (CALMOSEPTINE) 0.44-20.6 % ointment OINT     methylphenidate (RITALIN) tablet 10 mg     mirtazapine (REMERON) tablet TABS 7.5 mg     naloxone (NARCAN) injection 0.2 mg    Or     naloxone (NARCAN) injection 0.4 mg    Or     naloxone (NARCAN) injection 0.2 mg    Or     naloxone (NARCAN) injection 0.4 mg     ondansetron (ZOFRAN) solution 4 mg     polyethylene glycol (MIRALAX) Packet 17 g     scopolamine (TRANSDERM) 72 hr patch 1 patch    And     scopolamine (TRANSDERM-SCOP) Patch in Place     silver nitrate (ARZOL) Misc     sodium chloride  "(NEBUSAL) 3 % neb solution 3 mL     sodium chloride 0.9% infusion     venlafaxine (EFFEXOR) tablet 37.5 mg     Physical exam     BP (!) 145/71 (BP Location: Left arm)   Pulse 83   Temp 97.5  F (36.4  C) (Oral)   Resp 22   Ht 1.803 m (5' 10.98\")   Wt 67.1 kg (148 lb)   SpO2 94%   BMI 20.65 kg/m      Gen: awake, poorly interactive, trach on trach mask  HEENT: pale conjunctiva, moist mucosa  Neck: s/p trach   Lungs: ronchi both HT  CV: regular, no murmurs or gallops appreciated  Abdomen: soft, NT, BS wnl  Ext: no edema  Neuro: awake, poor interaction, moves spontaneously upper and lower extremities    Sodium   Date Value Ref Range Status   08/11/2021 143 136 - 145 mmol/L Final   06/11/2021 143 133 - 144 mmol/L Final     Potassium   Date Value Ref Range Status   08/11/2021 4.4 3.5 - 5.0 mmol/L Final   06/11/2021 3.8 3.4 - 5.3 mmol/L Final     Chloride   Date Value Ref Range Status   08/11/2021 103 98 - 107 mmol/L Final   06/11/2021 106 94 - 109 mmol/L Final     Carbon Dioxide   Date Value Ref Range Status   06/11/2021 36 (H) 20 - 32 mmol/L Final     Carbon Dioxide (CO2)   Date Value Ref Range Status   08/11/2021 32 (H) 22 - 31 mmol/L Final     Anion Gap   Date Value Ref Range Status   08/11/2021 8 5 - 18 mmol/L Final   06/11/2021 2 (L) 3 - 14 mmol/L Final     Glucose   Date Value Ref Range Status   08/11/2021 115 70 - 125 mg/dL Final   06/11/2021 142 (H) 70 - 99 mg/dL Final     Urea Nitrogen   Date Value Ref Range Status   08/11/2021 43 (H) 8 - 22 mg/dL Final   06/11/2021 25 7 - 30 mg/dL Final     Creatinine   Date Value Ref Range Status   08/11/2021 0.88 0.70 - 1.30 mg/dL Final   06/11/2021 0.57 (L) 0.66 - 1.25 mg/dL Final     GFR Estimate   Date Value Ref Range Status   08/11/2021 88 >60 mL/min/1.73m2 Final     Comment:     As of July 11, 2021, eGFR is calculated by the CKD-EPI creatinine equation, without race adjustment. eGFR can be influenced by muscle mass, exercise, and diet. The reported eGFR is an " estimation only and is only applicable if the renal function is stable.   07/09/2021 >60 >60 mL/min/1.73m2 Final   06/11/2021 >90 >60 mL/min/[1.73_m2] Final     Comment:     Non  GFR Calc  Starting 12/18/2018, serum creatinine based estimated GFR (eGFR) will be   calculated using the Chronic Kidney Disease Epidemiology Collaboration   (CKD-EPI) equation.       Calcium   Date Value Ref Range Status   08/11/2021 11.2 (H) 8.5 - 10.5 mg/dL Final   06/11/2021 9.6 8.5 - 10.1 mg/dL Final       XR CHEST PORT 1 VIEW  LOCATION: [facility]  DATE/TIME: 7/15/2021 7:50 AM  INDICATION: New VAP followup.  COMPARISON: 07/09/2021.  IMPRESSION:   No change in tracheostomy tube. Heart and mediastinal size are stable. There is indistinctness of the pulmonary interstitium, representing either airway inflammation or edema. There is bandlike opacity involving the retrocardiac region of the left lung base, increased from prior study and representing either atelectasis or infectious process. No pleural effusion or pneumothorax. There is some mild elevation of the right hemidiaphragm.    Diagnosis:     Patient is a 69 yo man with history of hypertension. Patient initially presented on 15-May-2021 to [facility] after being found unresponsive by his wife. Patient was then found to have a subarachnoid hemorrhage secondary to a ruptured aneurysm. Patient was intubated and was started on mannitol, IV antihypertensives and hyperventilation and patient was then transferred to [facility]. Patient underwent aneurysm clipping as well as placement of extra-ventricular drain from hydrocephalus on 15-May-2021. Extra-ventricular drain would malfunction and would require replacement on 04-Jun-2021 and 06-Jun-2021. Extra-ventricular drain reportedly was removed on 09-Jun-2021. Patient had intra-arterial vasospasm treatment with verapamil on 28-May-2021.  Patient was extubated on 16-May-2021  but had to be reintubated on 19-May-2021. Patient would be extubated on 03-Jun-2021 but would be reintubated on 06-Jun-2021. Patient had tracheostomy and PEG-tube placed on 07-Jun-2021. Patient required treatment for hospital-acquired pneumonia. Patient was transferred to LTAC on 11-Jun-2021.    1. Acute respiratory failure s/p trach 6/7/2021  2. S/p treatment pseudomonas ESBL and klebsiella pneumonia   3. Encephalopathy   4. Subarachnoid bleed s/p craniotomy and clipping P-comm rupture aneurysm.   5. HTN  6. Malnourishment   7. Dysphagia s/p G tube    Recommendations/Plans (MDM):  1. Weaning orders: continue phase 2 weaning trial   2. Trach change? no  3. Diagnostic testing? no  4. Continue pulmonary toiletting, scheduled ipratopium/albuterol, saline and mucomyst  nebs  5. Scopolamine patch added in view of persistent trach secretions after treatment for tracheobronchitis/pneumonia x2  6. DVT prophylaxis heparin SQ    Marcelino Navarro  Pulmonary / Critical Care  08/11/21   4:28 PM

## 2021-08-12 ENCOUNTER — APPOINTMENT (OUTPATIENT)
Dept: OCCUPATIONAL THERAPY | Facility: CLINIC | Age: 71
DRG: 207 | End: 2021-08-12
Attending: HOSPITALIST
Payer: COMMERCIAL

## 2021-08-12 ENCOUNTER — APPOINTMENT (OUTPATIENT)
Dept: PHYSICAL THERAPY | Facility: CLINIC | Age: 71
DRG: 207 | End: 2021-08-12
Attending: HOSPITALIST
Payer: COMMERCIAL

## 2021-08-12 ENCOUNTER — APPOINTMENT (OUTPATIENT)
Dept: SPEECH THERAPY | Facility: CLINIC | Age: 71
DRG: 207 | End: 2021-08-12
Attending: HOSPITALIST
Payer: COMMERCIAL

## 2021-08-12 PROCEDURE — 999N000009 HC STATISTIC AIRWAY CARE

## 2021-08-12 PROCEDURE — 94640 AIRWAY INHALATION TREATMENT: CPT

## 2021-08-12 PROCEDURE — 97110 THERAPEUTIC EXERCISES: CPT | Mod: GP | Performed by: PHYSICAL THERAPIST

## 2021-08-12 PROCEDURE — 250N000013 HC RX MED GY IP 250 OP 250 PS 637: Performed by: INTERNAL MEDICINE

## 2021-08-12 PROCEDURE — 99233 SBSQ HOSP IP/OBS HIGH 50: CPT | Performed by: INTERNAL MEDICINE

## 2021-08-12 PROCEDURE — 94640 AIRWAY INHALATION TREATMENT: CPT | Mod: 76

## 2021-08-12 PROCEDURE — 250N000009 HC RX 250: Performed by: INTERNAL MEDICINE

## 2021-08-12 PROCEDURE — 97110 THERAPEUTIC EXERCISES: CPT | Mod: GO | Performed by: OCCUPATIONAL THERAPIST

## 2021-08-12 PROCEDURE — 250N000013 HC RX MED GY IP 250 OP 250 PS 637

## 2021-08-12 PROCEDURE — 120N000017 HC R&B RESPIRATORY CARE

## 2021-08-12 PROCEDURE — 250N000013 HC RX MED GY IP 250 OP 250 PS 637: Performed by: HOSPITALIST

## 2021-08-12 PROCEDURE — 999N000123 HC STATISTIC OXYGEN O2DAILY TECH TIME

## 2021-08-12 PROCEDURE — 97535 SELF CARE MNGMENT TRAINING: CPT | Mod: GO | Performed by: OCCUPATIONAL THERAPIST

## 2021-08-12 PROCEDURE — 92526 ORAL FUNCTION THERAPY: CPT | Mod: GN

## 2021-08-12 PROCEDURE — 97112 NEUROMUSCULAR REEDUCATION: CPT | Mod: GP | Performed by: PHYSICAL THERAPIST

## 2021-08-12 PROCEDURE — 250N000011 HC RX IP 250 OP 636

## 2021-08-12 PROCEDURE — 92507 TX SP LANG VOICE COMM INDIV: CPT | Mod: GN

## 2021-08-12 PROCEDURE — 99232 SBSQ HOSP IP/OBS MODERATE 35: CPT | Performed by: NURSE PRACTITIONER

## 2021-08-12 PROCEDURE — 94660 CPAP INITIATION&MGMT: CPT

## 2021-08-12 PROCEDURE — 94668 MNPJ CHEST WALL SBSQ: CPT

## 2021-08-12 PROCEDURE — 999N000157 HC STATISTIC RCP TIME EA 10 MIN

## 2021-08-12 RX ADMIN — METHYLPHENIDATE HYDROCHLORIDE 10 MG: 10 TABLET ORAL at 05:37

## 2021-08-12 RX ADMIN — Medication 3 PACKET: at 09:08

## 2021-08-12 RX ADMIN — LISINOPRIL 40 MG: 20 TABLET ORAL at 18:31

## 2021-08-12 RX ADMIN — VENLAFAXINE 37.5 MG: 37.5 TABLET ORAL at 16:59

## 2021-08-12 RX ADMIN — ANORECTAL OINTMENT: 15.7; .44; 24; 20.6 OINTMENT TOPICAL at 13:55

## 2021-08-12 RX ADMIN — VENLAFAXINE 37.5 MG: 37.5 TABLET ORAL at 11:58

## 2021-08-12 RX ADMIN — Medication 3 PACKET: at 21:14

## 2021-08-12 RX ADMIN — MIRTAZAPINE 7.5 MG: 7.5 TABLET ORAL at 21:14

## 2021-08-12 RX ADMIN — ACETAMINOPHEN ORAL SOLUTION 650 MG: 650 SOLUTION ORAL at 13:20

## 2021-08-12 RX ADMIN — Medication 3 PACKET: at 13:55

## 2021-08-12 RX ADMIN — HEPARIN SODIUM 5000 UNITS: 5000 INJECTION, SOLUTION INTRAVENOUS; SUBCUTANEOUS at 13:55

## 2021-08-12 RX ADMIN — HEPARIN SODIUM 5000 UNITS: 5000 INJECTION, SOLUTION INTRAVENOUS; SUBCUTANEOUS at 21:14

## 2021-08-12 RX ADMIN — ANORECTAL OINTMENT: 15.7; .44; 24; 20.6 OINTMENT TOPICAL at 21:15

## 2021-08-12 RX ADMIN — ACETYLCYSTEINE 2 ML: 200 SOLUTION ORAL; RESPIRATORY (INHALATION) at 19:41

## 2021-08-12 RX ADMIN — HEPARIN SODIUM 5000 UNITS: 5000 INJECTION, SOLUTION INTRAVENOUS; SUBCUTANEOUS at 05:37

## 2021-08-12 RX ADMIN — IPRATROPIUM BROMIDE AND ALBUTEROL SULFATE 3 ML: 2.5; .5 SOLUTION RESPIRATORY (INHALATION) at 19:41

## 2021-08-12 RX ADMIN — FAMOTIDINE 20 MG: 20 TABLET, FILM COATED ORAL at 21:14

## 2021-08-12 RX ADMIN — SODIUM CHLORIDE 30 MG/ML INHALATION SOLUTION 3 ML: 30 SOLUTION INHALANT at 19:55

## 2021-08-12 RX ADMIN — FAMOTIDINE 20 MG: 20 TABLET, FILM COATED ORAL at 09:09

## 2021-08-12 RX ADMIN — IPRATROPIUM BROMIDE AND ALBUTEROL SULFATE 3 ML: 2.5; .5 SOLUTION RESPIRATORY (INHALATION) at 08:02

## 2021-08-12 RX ADMIN — VENLAFAXINE 37.5 MG: 37.5 TABLET ORAL at 09:09

## 2021-08-12 RX ADMIN — ANORECTAL OINTMENT: 15.7; .44; 24; 20.6 OINTMENT TOPICAL at 09:09

## 2021-08-12 RX ADMIN — ACETYLCYSTEINE 2 ML: 200 SOLUTION ORAL; RESPIRATORY (INHALATION) at 08:02

## 2021-08-12 RX ADMIN — SODIUM CHLORIDE 30 MG/ML INHALATION SOLUTION 3 ML: 30 SOLUTION INHALANT at 08:18

## 2021-08-12 ASSESSMENT — MIFFLIN-ST. JEOR: SCORE: 1482.7

## 2021-08-12 NOTE — PLAN OF CARE
Problem: Adult Inpatient Plan of Care  Goal: Plan of Care Review  Outcome: Improving  Goal: Absence of Hospital-Acquired Illness or Injury  Outcome: Improving  Intervention: Identify and Manage Fall Risk  Recent Flowsheet Documentation  Taken 8/12/2021 0336 by Lilly Woodard RN  Safety Promotion/Fall Prevention:   bed alarm on   activity supervised   room door open   room near nurse's station  Intervention: Prevent Infection  Recent Flowsheet Documentation  Taken 8/12/2021 0336 by Lilly Woodard RN  Infection Prevention: rest/sleep promoted     Problem: Anxiety  Goal: Anxiety Reduction or Resolution  Outcome: Improving     Problem: Skin and Tissue Injury (Artificial Airway)  Goal: Absence of Device-Related Skin or Tissue Injury  Outcome: Improving  Pt appears stable this shift. No signs of pain or discomfort. Pt had a good, and strong productive cough  and was suctioned couple of times this shift with small to moderate amount of thick yellow white secretions. Tolerated cares and repositioning. No new skin issue noted. Slept 6+ hours. Will continue to monitor.

## 2021-08-12 NOTE — PLAN OF CARE
Problem: Device-Related Complication Risk (Artificial Airway)  Goal: Optimal Device Function  Outcome: No Change     Problem: Skin and Tissue Injury (Artificial Airway)  Goal: Absence of Device-Related Skin or Tissue Injury  Outcome: No Change     Problem: Communication Impairment (Artificial Airway)  Goal: Effective Communication  Outcome: Improving   RT PROGRESS NOTE     DATA:     CURRENT SETTINGS:             TRACH TYPE / SIZE:  #6 bivona changed 7/28             MODE:   30L and 30% tm with cuff inflated                 ACTION:             THERAPIES:   albuterol bid, saline bid, mucomyst bid, metaneb bid             SUCTION:                           FREQUENCY:   x5                        AMOUNT:   small to mod                        CONSISTENCY:   thick                        COLOR:   white             SPONTANEOUS COUGH EFFORT/STRENGTH OF EFFORT (not elicited by suctioning): moderate                              WEANING PHASE:   2                        WEAN MODE:    pmv                        WEAN TIME:   started at 07:35 am                             RESPONSE:             BS:   clear and diminished                          EMOTIONAL NEEDS / CONCERNS:  no                RISK FOR SELF DECANNULATION:  yes                        RISK DUE TO:  confustion                        INTERVENTION/S IN PLACE IS/ARE:  Bilateral mitts       NOTE / PLAN:   Continue pulmonary toilet.

## 2021-08-12 NOTE — PROGRESS NOTES
[facility] - Palliative Care Daily Progress Note       Recommendations & Counseling     1. Encephalopathy in setting of SAH, b/l infarct.    Comment: Started on Ritalin 7/7, backed down d/t insomnia.  Started on Effexor and uptitrated 7/20 per [facility].  Slow progression and seems to have plateaued.  Still requires b/l hand mitts d/t pulling at lines.   Recommendation:    2. Insomnia likely d/t ritalin also being scheduled at bedtime?    Comment: Better now that on morning ritalin.  No current concerns.  Also on mirtazapine.  Recommendation  Continue current regimine    3. Dyspnea in setting of hypoxic resp failure related to SAH and CVA s/p trach.   Comment: He had  Issues with tracheal secretions so placed  on scopolamine patch. Secretions better.On 30 L TM.n weaning phase 2  Recommendations  Pulmonary and  RT.    4. Weakness -   Comment: more alert today and interactive than last visit 2 weeks ago  Recommendations  PT/OT/SLP.    -Agree with Ritalin as above.         Assessments          Dayron Neri 70 yoM with PMH HTN admitted 5/15/21  for AMS and a fall. Found to have acute subarachnoid hemorrhage and underwent left external ventricular drain placement.  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  Course complicated by CT head showing bilateral BAYLEE infarct.  Extubated 5/16, re-intubated 5/19, extubated again 6/3/2021.  Re-intubated again 6/6/2021 d/t hypoxia.  S/p trach/PEG on 6/7/2021, EVD removed 6/9/2021.  Admitted to LTACH 6/11.  Aspirated tube feeds 7/8; antibiotics 7/9-7/18.  Liberated from vent 7/19.    Prognosis, Goals, or Advance Care Planning was addressed today with: No.  Mood, coping, and/or meaning in the context of serious illness were addressed today: Yes.    Summary/Comments, Wife and daughter continue to monitor patient regularly. Wife appears a bit more relaxed.  Her main goal now is to get him to swallow and clear secretions, She felt he  "was more interactive this past weekend         Interval History:   Chart review/discussion with unit or clinical team members:   Discussed with RT, SW and RN    Per patient or family/caregivers today:  Met with wife and daughter on IPAD    Key Palliative Symptoms:  We are not managing pain in this patient  Dyspnea Yes          Review of Systems:     A full 14 point review of systems was otherwise completed and is negative aside from that mentioned above         Medications:      I have reviewed this patient's medication profile and medications during this hospitalization.         Data Reviewed:      Reviewed recent pertinent imaging, comments:   7/26 CXR  IMPRESSION: Midline tracheostomy. Normal heart size. Probable mild left basilar scar. No acute left lung infiltrate. There is a right lung base opacity, increased from previous which could represent worsening pneumonia or atelectasis.    Reviewed recent labs, comments:     Recent Labs   Lab 08/11/21  0610 08/10/21  2030 08/09/21 0615 08/06/21 0622   WBC  --   --  8.0  --    HGB  --   --  10.7*  --    MCV  --   --  96  --    PLT  --   --  289  --      --  142 139   POTASSIUM 4.4  --  4.5 4.7   CHLORIDE 103  --  101 99   CO2 32*  --  30 31   BUN 43*  --  42* 37*   CR 0.88  --  0.82 0.78   ANIONGAP 8  --  11 9   RAGINI 11.2*  --  11.1* 10.6*    108 118 111   ALBUMIN 2.9*  --   --   --                     Physical Exam:   /73 (BP Location: Left arm)   Pulse 78   Temp 98.2  F (36.8  C) (Oral)   Resp 20   Ht 1.803 m (5' 10.98\")   Wt 69.1 kg (152 lb 4.8 oz)   SpO2 94%   BMI 21.25 kg/m      GENERAL: laying in bed.  smiles at me  SKIN: Warm and dry   HEENT: Normocephalic, anicteric sclera, moist mucous membranes  LUNGS: Coarse to auscultation anterolaterally; non-labored  trache  CARDIAC: RRR, No ROSE  ABDOMINAL: BS+, soft, non distended, non tender  EXTREMITIES: No edema or cyanosis, pulses 2+ and symmetrical  NEUROLOGIC: Alert, follows commands " intermittently  PSYCH: calm     TT: I have personally spent a total of 25 minutes on the unit in review of medical record, consultation with the medical providers and assessment of patient today, with more than 50% of this time spent in counseling, coordination of care, and discussion with patient and staff re: prognosis, symptom management, and development of plan of care as noted above

## 2021-08-12 NOTE — PROGRESS NOTES
[facility] Palliative Care Social Work Note    Assessment   Visited with pt's wife Susy and dtr Yolanda on the IPAD in pt's room. Colleague SISI Alvarez present as well. Pt in in bed, alert, with mitts on. Pt appeared to be having issues with secretions so RT called. Matt said a few words and laughed appropriately during our visit, but also talked at times and it was not clear what he was trying to say.     Inquired about family members' coping. They are supporting each other and getting out when they can. They had a good visit with Matt last weekend. He was in the chair for several hours and was more engaged.    Susy is most worried about his swallowing and hopes that improves. She is grateful for the small improvements he is showing and acknowledged Matt's hard work. They are getting updates as needed from the care team and did not have any questions/concerns. They appreciated the visit and welcome continued support. Encouraged them to call if needs arise.     Clinical Social Work Interventions  Active listening  Validation of feelings   Emotional support    Plan of care   PC SW remains available to offer psychosocial/emotional support to pt and family.     Josephine Dailey, [facility]  Palliative Care   Office # [phone]

## 2021-08-12 NOTE — PROGRESS NOTES
Pulmonary Note    Patient discussed with RT and chart reviewed. Patient is well known to our service, having persistent secretion management issues following aneurysmal subarachnoid hemorrhage. On HFNC continuous 30 L/min 30% with trials of PMV, attempting to improve swallow. Had to be bagged and lavaged this morning for secretions. Managing with nebulized therapies, metaneb, scopolamine patch (though caution with the latter due to potential for secretion inspissation). No plans for decannulation at this time given issues with secretion management, generalized weakness, cough strength following stroke. Will formally round tomorrow.    Jamin Davis MD  Pulmonary and Critical Care Medicine  [facility]  Cell [phone]  Pager [phone]  Office [phone]  (he/him/his)

## 2021-08-12 NOTE — PLAN OF CARE
Problem: Device-Related Complication Risk (Artificial Airway)  Goal: Optimal Device Function  Outcome: Improving     Problem: Skin and Tissue Injury (Artificial Airway)  Goal: Absence of Device-Related Skin or Tissue Injury  Outcome: Improving     Problem: Communication Impairment (Artificial Airway)  Goal: Effective Communication  Outcome: Improving  RT PROGRESS NOTE     DATA:     CURRENT SETTINGS:             TRACH TYPE / SIZE: #6 Bivona (Cuff up, placed 07/28)             MODE:  TM             FIO2:   30%, 30 L      ACTION:             THERAPIES: Duo-neb/Mucomyst/Saline/MetaNeb BID             SUCTION:  Yes                          FREQUENCY: 4x                        AMOUNT:   Small x3 Moderate x1                        CONSISTENCY: Thick                        COLOR:   White             SPONTANEOUS COUGH EFFORT/STRENGTH OF EFFORT (not elicited by suctioning): Strong cough.                              WEANING PHASE:   2                        WEAN MODE:  PMV                         WEAN TIME:   14 hours and 45 minutes                        END WEAN REASON: Cuff up at noc     RESPONSE:             BS:   Coarse/Diminished             VITAL SIGNS: Sat 95-97%, HR 85-88, RR 20-22             EMOTIONAL NEEDS / CONCERNS:  None                RISK FOR SELF DECANNULATION:  Yes                        RISK DUE TO:  Confusion                        INTERVENTION/S IN PLACE IS/ARE: Mitts x2       NOTE / PLAN:     Pt remains on TM and tolerating well with no distress.  Continue current care plan.

## 2021-08-12 NOTE — PROGRESS NOTES
[facility]    Medicine Progress Note - Hospitalist Service       Date of Admission:  6/11/2021    Summary:    Dayron Neri is a 70 year male with h/o HTN who presented to ED on 5/15/2021 with acute SAH after a fall with unresponsiveness.  He underwent emergent left external ventricular drain placement.  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  On 5/28/2021, patient had persistent cerebral vasospasm, CT head showed bilateral BAYLEE infarct and was treated with milrinone and verapamil.  On 6/1/2021, angiogram showed no evidence of vasospasm.  Extubated on 6/3/2021.  6/6/2021 he was reintubated due to hypoxia.  On 6/7/2021, patient underwent tracheostomy and PEG tube placement.  EVD removed 6/9/2021.   Patient was treated for possible pneumonia with Unasyn on 5/20/2021, It was switched to ceftriaxone the next day for sputum culture growing Klebsiella. On 5/23, sputum culture also grew Pseudomonas. Antibiotic was switched to Zosyn.  Due to increased WBCs in CSF, possibility of ventriculitis was raised. CSF culture was negative. Antibiotic was switched to cefepime and vancomycin on 5/28/2021 for 2 weeks.  Vanco was discontinued on 6/9/2021.  On 6/5/2021, sputum grew ESBL so cefepime was switched to meropenem. He was transferred to [facility] on 6/11/21.       8/12 :     Clinically stable  Respiratory status stable - on 30 liters  On Phase 2 wean~continous heated trach mask with hi rosemarie 30% Fi02 tolerated PMV days.    On Duo-neb, four times a day sodium chloride neb two times a day for  pulmonary hygiene.    Frequent suctioning 5-12x/shift.      Neuro status stable  Needing b/l mittens    Ionized calcium very mildly elevated at 1.35  Corrected calcium at 12  PTH normal, vitamin D level mildly low  No new issues noted  Discussed with dietary team  Tube feeds have already been switched from Vital 1.5 to Katefarms - which has lower  calcium content of about 800 mg  Increase free water flushes to 200 ml every 4 hours from 180 ml  Will consider nephrology consult if calcium levels remain elevated.        Discussed with wife on phone and answered questions.    Barriers to discharge : still needing frequent suctioning and needing mittens        Assessment & Plan             Acute on chronic hypoxemic respiratory failure: S/p treatment pseudomonas ESBL and klebsiella pneumonia. Weaning has been slow and the biggest barrier for decannulation is excessive secretions  Glycopyrrolate was tried and stopped.  Also diuresed him with Lasix for 3 doses with some improvement however that improvement seems to have stabilized or plateaued.  Speaking valve is being attempted in short trials but does aspirate with blue dye test so needs to be only with speech/RT present.  Currently on phase 2 weaning with TM/PMV during day and is off the vent at night.   - Continue to wean per RT and pulmonary.   - Continue  metaneb, acetylcysteine, and 3% saline nebs   - Continue Scopolamine (started 7/20/2021)    Ventilator associated pneumonia: Completed antibiotics on July 18.    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 sputum culture 6/19 showed MDR pseudomonas. On Tobramycin nebs 6/24/2021 - 7/9/2021.   - Continue pulmonary toilet, scheduled duonebs    Traumatic brain injury/intracranial hemorrhage: 6/14/21 CT head done for fatigue and read as slight increase in caliber of lateral and third ventricle with possibility of developing communicating hydrocephalus. Discussed with Dr. Casillas (neurosurgeon at [facility]), who did not think there was much change. CT head repeated on 6/16/21, which did not show any change.  - Need follow up with Dr. Martinez with Neurosurgery in 3 months with repeat CTA of head and neck for post-op f/u.    Dysphagia: Patient failed the blue dye test 7/20/21 with immediate aspiration, continue  to use tube feeds. Speech therapy following.     Acute metabolic encephalopathy: improved.   - Continue Ritalin and effexor     Anemia: hemoglobin stable. Continue to monitor.     Essential hypertension: BP controlled. On lisinopril    Severe debility, weakness, critical illness, deconditioning, Continue PT/OT    Hypercalcemia - unclear etiology, mild at this point. Parathyroid hormone normal. May be related to relative immobilization as he is walking with therapy but only 1-2 times a day. Monitor.    Insomnia: is on Ritalin which was decreased to once daily due to insomnia when taken later in day.    - Continue nighttime mirtazapine 7.5 mg scheduled.       Diet: Adult Formula Drip Feeding: Continuous [employer] Peptide 1.5; Gastrostomy; Goal Rate: 90; mL/hr; Medication - Feeding Tube Flush Frequency: At least 15-30 mL water before and after medication administration and with tube clogging; Amount to Send ...    DVT Prophylaxis: Heparin SQ  Gutierrez Catheter: Not present  Central Lines: None  Code Status: Full Code      Disposition Plan   Expected discharge: to be determined    The patient's care was discussed with the Bedside Nurse and Care Coordinator/.    Isreal Granda MD  Hospitalist Service  [facility]  Securely message with the [employer] Web Console (learn more here)  Text page via [employer] Paging/Directory      ______________________________________________________________________      Data reviewed today: I reviewed all medications, new labs and imaging results over the last 24 hours.     Physical Exam   Vital Signs: Temp: 98.1  F (36.7  C) Temp src: Oral BP: 113/60 Pulse: 74   Resp: 22 SpO2: 97 % O2 Device: Trach dome Oxygen Delivery: 30 LPM  Weight: 152 lbs 4.8 oz    General appearance: not in acute distress  HEENT: PERRL, EOMI  Lungs: Clear breath sounds in bilateral lung fields  Cardiovascular: Regular rate and rhythm, normal S1-S2  Abdomen: Soft, non tender, no  distension  Musculoskeletal: No joint swelling  Skin: No rash and no edema  Neurology: AAOx3, Cranial nerves II - XII normal. Moves bilateral arms. Able to lift bilateral legs off the floor.    Data   Recent Labs   Lab 08/11/21  0610 08/10/21  2030 08/09/21  0615 08/06/21 0622   WBC  --   --  8.0  --    HGB  --   --  10.7*  --    MCV  --   --  96  --    PLT  --   --  289  --      --  142 139   POTASSIUM 4.4  --  4.5 4.7   CHLORIDE 103  --  101 99   CO2 32*  --  30 31   BUN 43*  --  42* 37*   CR 0.88  --  0.82 0.78   ANIONGAP 8  --  11 9   RAGINI 11.2*  --  11.1* 10.6*    108 118 111   ALBUMIN 2.9*  --   --   --        Restraint:    Patient pulls his IV line and trach.     Within an hour after restraint an in person face to face assessment was completed at 9:30 AM, including an evaluation of the patient's immediate reaction to the intervention, behavioral assessment and review/assessment of history, drugs and medications, recent labs, etc., and behavioral condition.  The patient experienced: No adverse physical outcome from seclusion/restraint initiation.  The intervention of restraint or seclusion needs to continue.

## 2021-08-12 NOTE — PLAN OF CARE
Problem: Communication Impairment (Artificial Airway)  Goal: Effective Communication  Outcome: Improving   Pt alert, oriented to self. On trach & trach mask. Pt was up in the chair for therapies. He was found scrunched over to left side at 1330H. Put him back to bed because he said he was in pain(back). Prn tylenol given. Effective.   Problem: Restraint for Non-Violent/Non-Self-Destructive Behavior  Goal: Prevent/Manage Potential Problems  Description: Maintain safety of patient and others during period of restraint.  Promote psychological and physical wellbeing.  Prevent injury to skin and involved body parts.  Promote nutrition, hydration, and elimination.  Outcome: No Change   Pt still on bilateral mitts restraints for safety. Pt refused to get up int the chair this PM. Wife aware.

## 2021-08-12 NOTE — PLAN OF CARE
Problem: Restraint for Non-Violent/Non-Self-Destructive Behavior  Goal: Prevent/Manage Potential Problems  Description: Maintain safety of patient and others during period of restraint.  Promote psychological and physical wellbeing.  Prevent injury to skin and involved body parts.  Promote nutrition, hydration, and elimination.  Outcome: No Change   Still with mitts x 2 for safety. He was put back to bed after therapy at 3:30pm.   Problem: Skin and Tissue Injury (Artificial Airway)  Goal: Absence of Device-Related Skin or Tissue Injury  Outcome: Improving   No bleeding noted on granulation site(trach). Resting in bed watching TV at this time. Condom catheter changed, patent. Will continue to monitor.

## 2021-08-13 ENCOUNTER — APPOINTMENT (OUTPATIENT)
Dept: PHYSICAL THERAPY | Facility: CLINIC | Age: 71
DRG: 207 | End: 2021-08-13
Attending: HOSPITALIST
Payer: COMMERCIAL

## 2021-08-13 ENCOUNTER — APPOINTMENT (OUTPATIENT)
Dept: SPEECH THERAPY | Facility: CLINIC | Age: 71
DRG: 207 | End: 2021-08-13
Attending: HOSPITALIST
Payer: COMMERCIAL

## 2021-08-13 ENCOUNTER — APPOINTMENT (OUTPATIENT)
Dept: OCCUPATIONAL THERAPY | Facility: CLINIC | Age: 71
DRG: 207 | End: 2021-08-13
Attending: HOSPITALIST
Payer: COMMERCIAL

## 2021-08-13 LAB
ALBUMIN SERPL-MCNC: 2.9 G/DL (ref 3.5–5)
ANION GAP SERPL CALCULATED.3IONS-SCNC: 10 MMOL/L (ref 5–18)
BUN SERPL-MCNC: 46 MG/DL (ref 8–22)
CALCIUM SERPL-MCNC: 11 MG/DL (ref 8.5–10.5)
CALCIUM, IONIZED MEASURED: 1.43 MMOL/L (ref 1.11–1.3)
CHLORIDE BLD-SCNC: 103 MMOL/L (ref 98–107)
CO2 SERPL-SCNC: 28 MMOL/L (ref 22–31)
CREAT SERPL-MCNC: 0.87 MG/DL (ref 0.7–1.3)
GFR SERPL CREATININE-BSD FRML MDRD: 89 ML/MIN/1.73M2
GLUCOSE BLD-MCNC: 110 MG/DL (ref 70–125)
ION CA PH 7.4: 1.42 MMOL/L (ref 1.11–1.3)
PH: 7.39 (ref 7.35–7.45)
PHOSPHATE SERPL-MCNC: 4.2 MG/DL (ref 2.5–4.5)
POTASSIUM BLD-SCNC: 4.5 MMOL/L (ref 3.5–5)
SARS-COV-2 RNA RESP QL NAA+PROBE: NEGATIVE
SODIUM SERPL-SCNC: 141 MMOL/L (ref 136–145)

## 2021-08-13 PROCEDURE — 97112 NEUROMUSCULAR REEDUCATION: CPT | Mod: GP | Performed by: PHYSICAL THERAPIST

## 2021-08-13 PROCEDURE — 250N000009 HC RX 250: Performed by: INTERNAL MEDICINE

## 2021-08-13 PROCEDURE — 97530 THERAPEUTIC ACTIVITIES: CPT | Mod: GP | Performed by: PHYSICAL THERAPIST

## 2021-08-13 PROCEDURE — 120N000017 HC R&B RESPIRATORY CARE

## 2021-08-13 PROCEDURE — 94660 CPAP INITIATION&MGMT: CPT

## 2021-08-13 PROCEDURE — 99233 SBSQ HOSP IP/OBS HIGH 50: CPT | Performed by: INTERNAL MEDICINE

## 2021-08-13 PROCEDURE — 250N000013 HC RX MED GY IP 250 OP 250 PS 637: Performed by: INTERNAL MEDICINE

## 2021-08-13 PROCEDURE — 250N000011 HC RX IP 250 OP 636

## 2021-08-13 PROCEDURE — 250N000013 HC RX MED GY IP 250 OP 250 PS 637

## 2021-08-13 PROCEDURE — 99207 PR CONSULT E&M CHANGED TO INITIAL LEVEL: CPT | Performed by: INTERNAL MEDICINE

## 2021-08-13 PROCEDURE — 94640 AIRWAY INHALATION TREATMENT: CPT

## 2021-08-13 PROCEDURE — 82330 ASSAY OF CALCIUM: CPT | Performed by: INTERNAL MEDICINE

## 2021-08-13 PROCEDURE — 97129 THER IVNTJ 1ST 15 MIN: CPT | Mod: GO | Performed by: OCCUPATIONAL THERAPIST

## 2021-08-13 PROCEDURE — 80069 RENAL FUNCTION PANEL: CPT | Performed by: INTERNAL MEDICINE

## 2021-08-13 PROCEDURE — 250N000013 HC RX MED GY IP 250 OP 250 PS 637: Performed by: HOSPITALIST

## 2021-08-13 PROCEDURE — 36415 COLL VENOUS BLD VENIPUNCTURE: CPT | Performed by: INTERNAL MEDICINE

## 2021-08-13 PROCEDURE — 94668 MNPJ CHEST WALL SBSQ: CPT

## 2021-08-13 PROCEDURE — 99207 PR CDG-CUT & PASTE-POTENTIAL IMPACT ON LEVEL: CPT | Performed by: INTERNAL MEDICINE

## 2021-08-13 PROCEDURE — 999N000157 HC STATISTIC RCP TIME EA 10 MIN

## 2021-08-13 PROCEDURE — 94640 AIRWAY INHALATION TREATMENT: CPT | Mod: 76

## 2021-08-13 PROCEDURE — 92507 TX SP LANG VOICE COMM INDIV: CPT | Mod: GN

## 2021-08-13 PROCEDURE — 87635 SARS-COV-2 COVID-19 AMP PRB: CPT | Performed by: INTERNAL MEDICINE

## 2021-08-13 PROCEDURE — 94003 VENT MGMT INPAT SUBQ DAY: CPT | Performed by: INTERNAL MEDICINE

## 2021-08-13 PROCEDURE — 97112 NEUROMUSCULAR REEDUCATION: CPT | Mod: GO | Performed by: OCCUPATIONAL THERAPIST

## 2021-08-13 PROCEDURE — 999N000009 HC STATISTIC AIRWAY CARE

## 2021-08-13 PROCEDURE — 92526 ORAL FUNCTION THERAPY: CPT | Mod: GN

## 2021-08-13 RX ADMIN — HEPARIN SODIUM 5000 UNITS: 5000 INJECTION, SOLUTION INTRAVENOUS; SUBCUTANEOUS at 05:10

## 2021-08-13 RX ADMIN — VENLAFAXINE 37.5 MG: 37.5 TABLET ORAL at 16:46

## 2021-08-13 RX ADMIN — FAMOTIDINE 20 MG: 20 TABLET, FILM COATED ORAL at 21:04

## 2021-08-13 RX ADMIN — ACETYLCYSTEINE 2 ML: 200 SOLUTION ORAL; RESPIRATORY (INHALATION) at 20:01

## 2021-08-13 RX ADMIN — ANORECTAL OINTMENT: 15.7; .44; 24; 20.6 OINTMENT TOPICAL at 08:25

## 2021-08-13 RX ADMIN — SCOPALAMINE 1 PATCH: 1 PATCH, EXTENDED RELEASE TRANSDERMAL at 13:19

## 2021-08-13 RX ADMIN — ANORECTAL OINTMENT 1 G: 15.7; .44; 24; 20.6 OINTMENT TOPICAL at 13:25

## 2021-08-13 RX ADMIN — ANORECTAL OINTMENT: 15.7; .44; 24; 20.6 OINTMENT TOPICAL at 21:07

## 2021-08-13 RX ADMIN — Medication 3 PACKET: at 09:51

## 2021-08-13 RX ADMIN — METHYLPHENIDATE HYDROCHLORIDE 10 MG: 10 TABLET ORAL at 05:10

## 2021-08-13 RX ADMIN — IPRATROPIUM BROMIDE AND ALBUTEROL SULFATE 3 ML: 2.5; .5 SOLUTION RESPIRATORY (INHALATION) at 20:01

## 2021-08-13 RX ADMIN — VENLAFAXINE 37.5 MG: 37.5 TABLET ORAL at 11:57

## 2021-08-13 RX ADMIN — HEPARIN SODIUM 5000 UNITS: 5000 INJECTION, SOLUTION INTRAVENOUS; SUBCUTANEOUS at 13:19

## 2021-08-13 RX ADMIN — MIRTAZAPINE 7.5 MG: 7.5 TABLET ORAL at 21:15

## 2021-08-13 RX ADMIN — ACETYLCYSTEINE 2 ML: 200 SOLUTION ORAL; RESPIRATORY (INHALATION) at 07:54

## 2021-08-13 RX ADMIN — VENLAFAXINE 37.5 MG: 37.5 TABLET ORAL at 08:25

## 2021-08-13 RX ADMIN — SODIUM CHLORIDE 30 MG/ML INHALATION SOLUTION 3 ML: 30 SOLUTION INHALANT at 20:01

## 2021-08-13 RX ADMIN — IPRATROPIUM BROMIDE AND ALBUTEROL SULFATE 3 ML: 2.5; .5 SOLUTION RESPIRATORY (INHALATION) at 07:54

## 2021-08-13 RX ADMIN — HEPARIN SODIUM 5000 UNITS: 5000 INJECTION, SOLUTION INTRAVENOUS; SUBCUTANEOUS at 21:05

## 2021-08-13 RX ADMIN — LISINOPRIL 40 MG: 20 TABLET ORAL at 19:57

## 2021-08-13 RX ADMIN — SODIUM CHLORIDE 30 MG/ML INHALATION SOLUTION 3 ML: 30 SOLUTION INHALANT at 07:54

## 2021-08-13 RX ADMIN — FAMOTIDINE 20 MG: 20 TABLET, FILM COATED ORAL at 08:25

## 2021-08-13 ASSESSMENT — MIFFLIN-ST. JEOR: SCORE: 1473.68

## 2021-08-13 NOTE — PROGRESS NOTES
[facility]    Medicine Progress Note - Hospitalist Service       Date of Admission:  6/11/2021    Summary:    Dayron Neri is a 70 year male with h/o HTN who presented to ED on 5/15/2021 with acute SAH after a fall with unresponsiveness.  He underwent emergent left external ventricular drain placement.  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  On 5/28/2021, patient had persistent cerebral vasospasm, CT head showed bilateral BAYLEE infarct and was treated with milrinone and verapamil.  On 6/1/2021, angiogram showed no evidence of vasospasm.  Extubated on 6/3/2021.  6/6/2021 he was reintubated due to hypoxia.  On 6/7/2021, patient underwent tracheostomy and PEG tube placement.  EVD removed 6/9/2021.   Patient was treated for possible pneumonia with Unasyn on 5/20/2021, It was switched to ceftriaxone the next day for sputum culture growing Klebsiella. On 5/23, sputum culture also grew Pseudomonas. Antibiotic was switched to Zosyn.  Due to increased WBCs in CSF, possibility of ventriculitis was raised. CSF culture was negative. Antibiotic was switched to cefepime and vancomycin on 5/28/2021 for 2 weeks.  Vanco was discontinued on 6/9/2021.  On 6/5/2021, sputum grew ESBL so cefepime was switched to meropenem. He was transferred to [facility] on 6/11/21.       8/13 :     Clinically stable  Respiratory status stable - on 30 liters  On Phase 2 wean~continous heated trach mask with hi rosemarie 30% Fi02 tolerated PMV days.    On Duo-neb, four times a day sodium chloride neb two times a day for  pulmonary hygiene.    Frequent suctioning 5-12x/shift.      Neuro status stable  Needing b/l mittens    Ionized calcium very mildly elevated at 1.35  Corrected calcium at 12  PTH normal, vitamin D level mildly low  No new issues noted  Discussed with dietary team  Tube feeds have already been switched from Vital 1.5 to Katefarms - which has lower  calcium content of about 800 mg  Increased free water flushes to 200 ml every 4 hours from 180 ml  Will consider nephrology consult if calcium levels remain elevated.        Discussed with wife on phone and answered questions.    Barriers to discharge : still needing frequent suctioning and needing mittens        Assessment & Plan               Acute on chronic hypoxemic respiratory failure: S/p treatment pseudomonas ESBL and klebsiella pneumonia. Weaning has been slow and the biggest barrier for decannulation is excessive secretions  Glycopyrrolate was tried and stopped.  Also diuresed him with Lasix for 3 doses with some improvement however that improvement seems to have stabilized or plateaued.  Speaking valve is being attempted in short trials but does aspirate with blue dye test so needs to be only with speech/RT present.  Currently on phase 2 weaning with TM/PMV during day and is off the vent at night.   - Continue to wean per RT and pulmonary.   - Continue  metaneb, acetylcysteine, and 3% saline nebs   - Continue Scopolamine (started 7/20/2021)    Ventilator associated pneumonia: Completed antibiotics on July 18.    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 sputum culture 6/19 showed MDR pseudomonas. On Tobramycin nebs 6/24/2021 - 7/9/2021.   - Continue pulmonary toilet, scheduled duonebs    Traumatic brain injury/intracranial hemorrhage: 6/14/21 CT head done for fatigue and read as slight increase in caliber of lateral and third ventricle with possibility of developing communicating hydrocephalus. Discussed with Dr. Casillas (neurosurgeon at [facility]), who did not think there was much change. CT head repeated on 6/16/21, which did not show any change.  - Need follow up with Dr. Martinez with Neurosurgery in 3 months with repeat CTA of head and neck for post-op f/u.    Dysphagia: Patient failed the blue dye test 7/20/21 with immediate aspiration,  continue to use tube feeds. Speech therapy following.     Acute metabolic encephalopathy: improved.   - Continue Ritalin and effexor     Anemia: hemoglobin stable. Continue to monitor.     Essential hypertension: BP controlled. On lisinopril    Severe debility, weakness, critical illness, deconditioning, Continue PT/OT    Hypercalcemia - unclear etiology, mild at this point. Parathyroid hormone normal. May be related to relative immobilization as he is walking with therapy but only 1-2 times a day. Monitor.    Insomnia: is on Ritalin which was decreased to once daily due to insomnia when taken later in day.    - Continue nighttime mirtazapine 7.5 mg scheduled.       Diet: Adult Formula Drip Feeding: Continuous [employer] Peptide 1.5; Gastrostomy; Goal Rate: 90; mL/hr; Medication - Feeding Tube Flush Frequency: At least 15-30 mL water before and after medication administration and with tube clogging; Amount to Send ...    DVT Prophylaxis: Heparin SQ  Gutierrez Catheter: Not present  Central Lines: None  Code Status: Full Code      Disposition Plan   Expected discharge: to be determined    The patient's care was discussed with the Bedside Nurse and Care Coordinator/.    Isreal Granda MD  Hospitalist Service  [facility]  Securely message with the [employer] Web Console (learn more here)  Text page via [employer] Paging/Directory      ______________________________________________________________________      Data reviewed today: I reviewed all medications, new labs and imaging results over the last 24 hours.     Physical Exam   Vital Signs: Temp: 97.3  F (36.3  C) Temp src: Axillary BP: (!) 129/92 Pulse: 89   Resp: 21 SpO2: 93 % O2 Device: Trach dome Oxygen Delivery: 30 LPM  Weight: 150 lbs 5 oz    General appearance: not in acute distress  HEENT: PERRL, EOMI  Lungs: Clear breath sounds in bilateral lung fields  Cardiovascular: Regular rate and rhythm, normal S1-S2  Abdomen: Soft, non tender, no  distension  Musculoskeletal: No joint swelling  Skin: No rash and no edema  Neurology: AAOx3, Cranial nerves II - XII normal. Moves bilateral arms. Able to lift bilateral legs off the floor.    Data   Recent Labs   Lab 08/13/21  0604 08/11/21  0610 08/10/21  2030 08/09/21  0615   WBC  --   --   --  8.0   HGB  --   --   --  10.7*   MCV  --   --   --  96   PLT  --   --   --  289    143  --  142   POTASSIUM 4.5 4.4  --  4.5   CHLORIDE 103 103  --  101   CO2 28 32*  --  30   BUN 46* 43*  --  42*   CR 0.87 0.88  --  0.82   ANIONGAP 10 8  --  11   RAGINI 11.0* 11.2*  --  11.1*    115 108 118   ALBUMIN 2.9* 2.9*  --   --        Restraint:    Patient pulls his IV line and trach.     Within an hour after restraint an in person face to face assessment was completed at 9:30 AM, including an evaluation of the patient's immediate reaction to the intervention, behavioral assessment and review/assessment of history, drugs and medications, recent labs, etc., and behavioral condition.  The patient experienced: No adverse physical outcome from seclusion/restraint initiation.  The intervention of restraint or seclusion needs to continue.

## 2021-08-13 NOTE — PROGRESS NOTES
RT PROGRESS NOTE     DATA:     CURRENT SETTINGS:             TRACH TYPE / SIZE:  #6 Bivona (7/28)             MODE:   TM             FIO2:   30%, 30L     ACTION:             THERAPIES:   Duoneb BID, Mucomyst BID, Sodium Chloride BID, Metaneb BID             SUCTION:                           FREQUENCY:   x4                        AMOUNT:   Moderate to Copious                        CONSISTENCY:   Thick                        COLOR:   Clear/White             SPONTANEOUS COUGH EFFORT/STRENGTH OF EFFORT (not elicited by suctioning): Moderate Spontaneous Cough                           WEANING PHASE:   Phase 2                        WEAN MODE:    30% 30L TM                        WEAN TIME:   Continuous                        END WEAN REASON:   NA     RESPONSE:             BS:   Coarse/Diminished             VITAL SIGNS:   Sat 93-96%, RR 21-24, HR 84-90             EMOTIONAL NEEDS / CONCERNS:  NA                RISK FOR SELF DECANNULATION:  Yes                        RISK DUE TO:  Agitation                        INTERVENTION/S IN PLACE IS/ARE:  Nicole       NOTE / PLAN:   PMV started at 8:09am.  PMV during the day and cuff up at night.  RT will continue to monitor.

## 2021-08-13 NOTE — PLAN OF CARE
Problem: Mechanical Ventilation  Goal: Patient will maintain patent airway  Outcome: Progressing  Goal: Tracheostomy will be managed safely  Outcome: Progressing    RT PROGRESS NOTE     DATA:     CURRENT SETTINGS:               TRACH TYPE / SIZE: #6 Bivona, placed 7/28/2021             MODE:  HF 30L TM, cuff up   FIO2:  30%     ACTION:             THERAPIES: Duo/ Mucomyst neb via Metaneb, Sodium Chloride neb given on scheduled time             SUCTION:                           FREQUENCY: 1                        AMOUNT:  large                        CONSISTENCY: thick, lavage x 1                         COLOR:   white             SPONTANEOUS COUGH EFFORT/STRENGTH OF EFFORT (not elicited by suctioning): not observed                            WEANING PHASE:   2                        WEAN MODE: 30% HF 30L TM/ PMV                        WEAN TIME: PMV for 14 hrs 1 min                         END WEAN REASON: order: PMV off, cuff up for the night     RESPONSE:             BS:   coarse - clear decreased after Sx              VITAL SIGNS: O2 sat 93-94%, HR 85-86, RR 22             EMOTIONAL NEEDS / CONCERNS:  None               RISK FOR SELF DECANNULATION:  Yes                        RISK DUE TO: Confusion                        INTERVENTION/S IN PLACE IS/ARE: Restraints x2       NOTE / PLAN: continue current plan of care - TM with PMV during the day, PMV off for night, cuff up.

## 2021-08-13 NOTE — PROGRESS NOTES
"   21 1030   Signing Clinician's Name / Credentials   Signing clinician's name / credentials Sheridan Clark MA, CCC-SLP   Quick Adds   Rehab Discipline SLP   SLP - Cognitive Linguistic   Minutes of Treatment 8 minutes   Treatment Detail To improve awareness of environmental and personal information, orientation was targeted. Patient was independently oriented to self, , his city of residence, and state. He was not oriented to his age, type of building (stated, \"My sister's apartment,\"), current city, or reason for hospitalization. On several occasions, he denied being a patient in the hospital. While unable to recall type of place/building after a 3-minute delay, patient was able to recall situation (stated, \"Brain bleed,\").   SLP - Dysphagia/Swallow   Minutes of Treatment 19 minutes   Treatment Detail To improve swallow frequency and effectiveness, patient completed the effortful swallow exercise. He required mod-max verbal cues, physical prompts, and moistened oral swabs to initiate swallows. Coughing was noted with every 3-4 presentations of swabs, concerning for aspiration. Copious audible pharyngeal secretions noted. Patient frequently coughed on these as well. He is unable to follow directions to cough these up into his mouth. Clinician completed deep oral suctioning with yankauer 5x during session for large to copious amounts of thick, clear pharyngeal secretions/saliva. RT (Jennifer Mon) notified.   SLP Discharge Planning    SLP Discharge Recommendation (DC Rec) Transitional Care Facility   SLP Rationale for DC Rec Impaired cognition and difficulty participating in intensive treatment   SLP Brief overview of current status  Copious pharyngeal secretions noted. Patient is unable to clear these independently. Continue NPO. Patient demonstrates intermittent coughing with moistened oral swabs, concerning for aspiration. However, he does require some moisture to be able to elicit a swallow. Balancing " between aspiration risk and initiation of swallow continues to be delicate. Thorough oral cares continue to be important to reduce risk of complications.   Additional Documentation   SLP Plan Continue effortful swallow exercises and voicing. Also address basic attention, verbal expression, and auditory comprehension.   Total Session Time   Total Session Time (minutes) 27 minutes

## 2021-08-13 NOTE — PLAN OF CARE
Problem: Anxiety  Goal: Anxiety Reduction or Resolution  Outcome: Improving     Problem: Skin and Tissue Injury (Artificial Airway)  Goal: Absence of Device-Related Skin or Tissue Injury  Outcome: Improving   Vital signs were stable. Pt. slept most of the shift. Pt. was repositioned every 2 hours. Continue to monitor.   Margot Sargent RN

## 2021-08-13 NOTE — PROGRESS NOTES
RT PROGRESS NOTE    DATA  CURRENT SETTINGS --  Ventilation Mode: Trach collar  FiO2 (%): 30 %  Oxygen Concentration (%): 30 %  Resp: 20  TRACH TYPE -- Bivona #6 Cuffed (7/28)    ACTION  THERAPIES -- BID Mucomyst/Duoneb/NaCl 3% with Metaneb   SUCTION   FREQUENCY -- 3   AMOUNT -- moderate to large   CONSISTENCY -- thick    COLOR -- white    SPONTANEOUS COUGH -- good   WEAN PHASE #2   MODE -- HTD 30 LPM 30%   DURATION -- 24/7   END REASON -- N/A    RESPONSE  BREATH SOUNDS -- clear to coarse   VITAL SIGNS --  Temp:  [97.8  F (36.6  C)-98.2  F (36.8  C)] 97.8  F (36.6  C)  Pulse:  [74-95] 85  Resp:  [20-24] 20  BP: (111-118)/(60-73) 118/71  FiO2 (%):  [30 %] 30 %  SpO2:  [86 %-97 %] 96 %   EMOTIONAL CONCERNS -- None  RISK FOR SELF-DECANNULATION -- Yes; Mitt Restraints in Place     NOTE   No signs of respiratory distress. RT will continue to monitor.     Kayla Small, RT on 8/13/2021 at 3:51 AM

## 2021-08-13 NOTE — PLAN OF CARE
Pt. is only oriented to self. He has mitt restraints x 2. He can make some of his wants known by yes of no answers and sometimes indict whether he is in pain.     Pt. has not had a Bm since 8/9. Fiber was given in he AM.     Pts wife would like him up once every shift.

## 2021-08-13 NOTE — PROGRESS NOTES
"Pulmonary Medicine Follow up Note - Ventilator Rounds:    Clinical status discussed today with RN and respiratory therapist.    Chief complaint: Ongoing respiratory failure  Significant change in clinical status during past 24 hours? no     Ventilation Mode: Trach collar  FiO2 (%): 30 %  Oxygen Concentration (%): 30 %  Resp: 21    Tracheal secretions: o/n x3 for mod-large thick white, \"good\" cough though weak on my assessment  Current phase of ventilator weaning pathway:  Phase 2 with PMV days as tolerated, PMV off with cuff inflated at night  Ventilator weaning results from yesterday: PMV all day, PMV off and cuff inflated at night    Current Facility-Administered Medications   Medication     acetaminophen (TYLENOL) solution 650 mg    Or     acetaminophen (TYLENOL) tablet 650 mg     acetylcysteine (MUCOMYST) 20 % nebulizer solution 2 mL     alteplase (CATHFLO ACTIVASE) injection 2 mg     bisacodyl (DULCOLAX) Suppository 10 mg     dextrose 10% infusion     dextrose 50 % injection 25-50 mL    Or     glucagon injection 1 mg     docusate (COLACE) 50 MG/5ML liquid 100 mg    Or     docusate sodium (COLACE) capsule 100 mg     famotidine (PEPCID) tablet 20 mg     heparin ANTICOAGULANT injection 5,000 Units     hydrALAZINE (APRESOLINE) injection 10 mg     ipratropium - albuterol 0.5 mg/2.5 mg/3 mL (DUONEB) neb solution 3 mL     lisinopril (ZESTRIL) tablet 40 mg     loperamide (IMODIUM) liquid 1 mg     magnesium hydroxide (MILK OF MAGNESIA) suspension 30 mL     menthol-zinc oxide (CALMOSEPTINE) 0.44-20.6 % ointment OINT     methylphenidate (RITALIN) tablet 10 mg     mirtazapine (REMERON) tablet TABS 7.5 mg     naloxone (NARCAN) injection 0.2 mg    Or     naloxone (NARCAN) injection 0.4 mg    Or     naloxone (NARCAN) injection 0.2 mg    Or     naloxone (NARCAN) injection 0.4 mg     ondansetron (ZOFRAN) solution 4 mg     polyethylene glycol (MIRALAX) Packet 17 g     scopolamine (TRANSDERM) 72 hr patch 1 patch    And     " "scopolamine (TRANSDERM-SCOP) Patch in Place     silver nitrate (ARZOL) Misc     sodium chloride (NEBUSAL) 3 % neb solution 3 mL     sodium chloride 0.9% infusion     venlafaxine (EFFEXOR) tablet 37.5 mg     Physical exam     BP (!) 129/92 (BP Location: Left arm)   Pulse 89   Temp 97.3  F (36.3  C) (Axillary)   Resp 21   Ht 1.803 m (5' 10.98\")   Wt 68.2 kg (150 lb 5 oz)   SpO2 93%   BMI 20.97 kg/m      Gen: awake, tracks intermittently, trach on trach mask  HEENT: no BENJAMIN  Neck: s/p trach   Lungs: rhonchi both HT  CV: regular, no murmurs or gallops appreciated  Abdomen: soft, NT, BS wnl  Ext: no edema  Neuro: awake, tracking intermittently but only intermittently follows commands, moves spontaneously upper and lower extremities    Sodium   Date Value Ref Range Status   08/13/2021 141 136 - 145 mmol/L Final   06/11/2021 143 133 - 144 mmol/L Final     Potassium   Date Value Ref Range Status   08/13/2021 4.5 3.5 - 5.0 mmol/L Final   06/11/2021 3.8 3.4 - 5.3 mmol/L Final     Chloride   Date Value Ref Range Status   08/13/2021 103 98 - 107 mmol/L Final   06/11/2021 106 94 - 109 mmol/L Final     Carbon Dioxide   Date Value Ref Range Status   06/11/2021 36 (H) 20 - 32 mmol/L Final     Carbon Dioxide (CO2)   Date Value Ref Range Status   08/13/2021 28 22 - 31 mmol/L Final     Anion Gap   Date Value Ref Range Status   08/13/2021 10 5 - 18 mmol/L Final   06/11/2021 2 (L) 3 - 14 mmol/L Final     Glucose   Date Value Ref Range Status   08/13/2021 110 70 - 125 mg/dL Final   06/11/2021 142 (H) 70 - 99 mg/dL Final     Urea Nitrogen   Date Value Ref Range Status   08/13/2021 46 (H) 8 - 22 mg/dL Final   06/11/2021 25 7 - 30 mg/dL Final     Creatinine   Date Value Ref Range Status   08/13/2021 0.87 0.70 - 1.30 mg/dL Final   06/11/2021 0.57 (L) 0.66 - 1.25 mg/dL Final     GFR Estimate   Date Value Ref Range Status   08/13/2021 89 >60 mL/min/1.73m2 Final     Comment:     As of July 11, 2021, eGFR is calculated by the CKD-EPI " creatinine equation, without race adjustment. eGFR can be influenced by muscle mass, exercise, and diet. The reported eGFR is an estimation only and is only applicable if the renal function is stable.   07/09/2021 >60 >60 mL/min/1.73m2 Final   06/11/2021 >90 >60 mL/min/[1.73_m2] Final     Comment:     Non  GFR Calc  Starting 12/18/2018, serum creatinine based estimated GFR (eGFR) will be   calculated using the Chronic Kidney Disease Epidemiology Collaboration   (CKD-EPI) equation.       Calcium   Date Value Ref Range Status   08/13/2021 11.0 (H) 8.5 - 10.5 mg/dL Final   06/11/2021 9.6 8.5 - 10.1 mg/dL Final       XR CHEST PORT 1 VIEW  LOCATION: [facility]  DATE/TIME: 7/15/2021 7:50 AM  INDICATION: New VAP followup.  COMPARISON: 07/09/2021.  IMPRESSION:   No change in tracheostomy tube. Heart and mediastinal size are stable. There is indistinctness of the pulmonary interstitium, representing either airway inflammation or edema. There is bandlike opacity involving the retrocardiac region of the left lung base, increased from prior study and representing either atelectasis or infectious process. No pleural effusion or pneumothorax. There is some mild elevation of the right hemidiaphragm.    Diagnosis:     Patient is a 69 yo man with history of hypertension. Patient initially presented on 15-May-2021 to [facility] after being found unresponsive by his wife. Patient was then found to have a subarachnoid hemorrhage secondary to a ruptured aneurysm. Patient was intubated and was started on mannitol, IV antihypertensives and hyperventilation and patient was then transferred to [facility]. Patient underwent aneurysm clipping as well as placement of extra-ventricular drain from hydrocephalus on 15-May-2021. Extra-ventricular drain would malfunction and would require replacement on 04-Jun-2021 and 06-Jun-2021. Extra-ventricular drain reportedly was removed on  09-Jun-2021. Patient had intra-arterial vasospasm treatment with verapamil on 28-May-2021.  Patient was extubated on 16-May-2021 but had to be reintubated on 19-May-2021. Patient would be extubated on 03-Jun-2021 but would be reintubated on 06-Jun-2021. Patient had tracheostomy and PEG-tube placed on 07-Jun-2021. Patient required treatment for hospital-acquired pneumonia. Patient was transferred to LT[facility] on 11-Jun-2021.    1. Acute respiratory failure s/p trach 6/7/2021  2. S/p treatment pseudomonas ESBL and klebsiella pneumonia   3. Encephalopathy   4. Subarachnoid bleed s/p craniotomy and clipping P-comm rupture aneurysm.   5. HTN  6. Malnourishment   7. Dysphagia s/p G tube    Recommendations/Plans (MDM):  1. Weaning orders: continue phase 2 with PMV days as tolerated, PMV off and cuff inflated at night  2. Trach change? Routine #6 bivona, next changed due 8/28  3. Diagnostic testing? no  4. Continue pulmonary toiletting, scheduled ipratropium-albuterol, 3% saline, acetylcysteine, metaneb  5. Scopolamine patch; would not add additional patch given risk of anticholinergic CNS effects and secretion inspissation  6. DVT prophylaxis heparin SQ    Jamin Davis MD  Pulmonary and Critical Care Medicine  [facility]  Cell [phone]  Pager [phone]  Office [phone]  (he/him/his)

## 2021-08-13 NOTE — PLAN OF CARE
Problem: Device-Related Complication Risk (Artificial Airway)  Goal: Optimal Device Function  Outcome: No Change  Intervention: Optimize Device Care and Function  Recent Flowsheet Documentation  Taken 8/13/2021 0754 by Jennifer Mon, RT  Airway Safety Measures: all equipment/monitors on and audible     Problem: Skin and Tissue Injury (Artificial Airway)  Goal: Absence of Device-Related Skin or Tissue Injury  Outcome: No Change     Problem: Communication Impairment (Artificial Airway)  Goal: Effective Communication  Outcome: No Change     Problem: Device-Related Complication Risk (Mechanical Ventilation, Invasive)  Goal: Optimal Device Function  Intervention: Optimize Device Care and Function  Recent Flowsheet Documentation  Taken 8/13/2021 0754 by Jennifer Mon, RT  Airway Safety Measures: all equipment/monitors on and audible

## 2021-08-13 NOTE — PLAN OF CARE
Problem: Device-Related Complication Risk (Artificial Airway)  Goal: Optimal Device Function  Outcome: Improving  Intervention: Optimize Device Care and Function  Recent Flowsheet Documentation  Taken 8/12/2021 2333 by Kayla Small, RT  Airway Safety Measures:   all equipment/monitors on and audible   manual resuscitator/mask/valve in room   suction at bedside   suction regulator   suction equipment   oxygen flowmeter     Problem: Skin and Tissue Injury (Artificial Airway)  Goal: Absence of Device-Related Skin or Tissue Injury  Outcome: Improving     Problem: Communication Impairment (Artificial Airway)  Goal: Effective Communication  Outcome: Improving     Problem: Device-Related Complication Risk (Mechanical Ventilation, Invasive)  Goal: Optimal Device Function  Intervention: Optimize Device Care and Function  Recent Flowsheet Documentation  Taken 8/12/2021 2333 by Kayla Small, RT  Airway Safety Measures:   all equipment/monitors on and audible   manual resuscitator/mask/valve in room   suction at bedside   suction regulator   suction equipment   oxygen flowmeter

## 2021-08-14 ENCOUNTER — APPOINTMENT (OUTPATIENT)
Dept: PHYSICAL THERAPY | Facility: CLINIC | Age: 71
DRG: 207 | End: 2021-08-14
Attending: HOSPITALIST
Payer: COMMERCIAL

## 2021-08-14 LAB
CALCIUM SERPL-MCNC: 11.2 MG/DL (ref 8.5–10.5)
CALCIUM, IONIZED MEASURED: 1.42 MMOL/L (ref 1.11–1.3)
ION CA PH 7.4: 1.37 MMOL/L (ref 1.11–1.3)
PH: 7.33 (ref 7.35–7.45)

## 2021-08-14 PROCEDURE — 120N000017 HC R&B RESPIRATORY CARE

## 2021-08-14 PROCEDURE — 82330 ASSAY OF CALCIUM: CPT | Performed by: INTERNAL MEDICINE

## 2021-08-14 PROCEDURE — 94660 CPAP INITIATION&MGMT: CPT

## 2021-08-14 PROCEDURE — 97530 THERAPEUTIC ACTIVITIES: CPT | Mod: GP | Performed by: PHYSICAL THERAPIST

## 2021-08-14 PROCEDURE — 999N000157 HC STATISTIC RCP TIME EA 10 MIN

## 2021-08-14 PROCEDURE — 250N000011 HC RX IP 250 OP 636

## 2021-08-14 PROCEDURE — 99233 SBSQ HOSP IP/OBS HIGH 50: CPT | Performed by: INTERNAL MEDICINE

## 2021-08-14 PROCEDURE — 82310 ASSAY OF CALCIUM: CPT | Performed by: INTERNAL MEDICINE

## 2021-08-14 PROCEDURE — 97110 THERAPEUTIC EXERCISES: CPT | Mod: GP | Performed by: PHYSICAL THERAPIST

## 2021-08-14 PROCEDURE — 250N000009 HC RX 250: Performed by: INTERNAL MEDICINE

## 2021-08-14 PROCEDURE — 250N000013 HC RX MED GY IP 250 OP 250 PS 637

## 2021-08-14 PROCEDURE — 94668 MNPJ CHEST WALL SBSQ: CPT

## 2021-08-14 PROCEDURE — 999N000009 HC STATISTIC AIRWAY CARE

## 2021-08-14 PROCEDURE — 250N000013 HC RX MED GY IP 250 OP 250 PS 637: Performed by: INTERNAL MEDICINE

## 2021-08-14 PROCEDURE — 94640 AIRWAY INHALATION TREATMENT: CPT

## 2021-08-14 PROCEDURE — 250N000013 HC RX MED GY IP 250 OP 250 PS 637: Performed by: HOSPITALIST

## 2021-08-14 PROCEDURE — 94640 AIRWAY INHALATION TREATMENT: CPT | Mod: 76

## 2021-08-14 PROCEDURE — 36415 COLL VENOUS BLD VENIPUNCTURE: CPT | Performed by: INTERNAL MEDICINE

## 2021-08-14 RX ORDER — MAGNESIUM HYDROXIDE 1200 MG/15ML
LIQUID ORAL
Status: DISCONTINUED | OUTPATIENT
Start: 2021-08-14 | End: 2021-08-16

## 2021-08-14 RX ADMIN — ACETYLCYSTEINE 2 ML: 200 SOLUTION ORAL; RESPIRATORY (INHALATION) at 07:30

## 2021-08-14 RX ADMIN — MAGNESIUM HYDROXIDE 200 ML: 1200 LIQUID ORAL at 18:04

## 2021-08-14 RX ADMIN — ANORECTAL OINTMENT: 15.7; .44; 24; 20.6 OINTMENT TOPICAL at 13:21

## 2021-08-14 RX ADMIN — ANORECTAL OINTMENT 1 G: 15.7; .44; 24; 20.6 OINTMENT TOPICAL at 08:02

## 2021-08-14 RX ADMIN — HEPARIN SODIUM 5000 UNITS: 5000 INJECTION, SOLUTION INTRAVENOUS; SUBCUTANEOUS at 21:36

## 2021-08-14 RX ADMIN — ACETYLCYSTEINE 2 ML: 200 SOLUTION ORAL; RESPIRATORY (INHALATION) at 19:28

## 2021-08-14 RX ADMIN — FAMOTIDINE 20 MG: 20 TABLET, FILM COATED ORAL at 21:36

## 2021-08-14 RX ADMIN — SODIUM CHLORIDE 30 MG/ML INHALATION SOLUTION 3 ML: 30 SOLUTION INHALANT at 07:31

## 2021-08-14 RX ADMIN — LISINOPRIL 40 MG: 20 TABLET ORAL at 18:21

## 2021-08-14 RX ADMIN — HEPARIN SODIUM 5000 UNITS: 5000 INJECTION, SOLUTION INTRAVENOUS; SUBCUTANEOUS at 13:22

## 2021-08-14 RX ADMIN — IPRATROPIUM BROMIDE AND ALBUTEROL SULFATE 3 ML: 2.5; .5 SOLUTION RESPIRATORY (INHALATION) at 07:30

## 2021-08-14 RX ADMIN — MAGNESIUM HYDROXIDE 200 ML: 1200 LIQUID ORAL at 20:13

## 2021-08-14 RX ADMIN — MAGNESIUM HYDROXIDE: 1200 LIQUID ORAL at 21:45

## 2021-08-14 RX ADMIN — VENLAFAXINE 37.5 MG: 37.5 TABLET ORAL at 07:56

## 2021-08-14 RX ADMIN — METHYLPHENIDATE HYDROCHLORIDE 10 MG: 10 TABLET ORAL at 05:56

## 2021-08-14 RX ADMIN — SODIUM CHLORIDE 30 MG/ML INHALATION SOLUTION 3 ML: 30 SOLUTION INHALANT at 19:29

## 2021-08-14 RX ADMIN — FAMOTIDINE 20 MG: 20 TABLET, FILM COATED ORAL at 08:02

## 2021-08-14 RX ADMIN — DOCUSATE SODIUM 100 MG: 50 LIQUID ORAL at 06:55

## 2021-08-14 RX ADMIN — IPRATROPIUM BROMIDE AND ALBUTEROL SULFATE 3 ML: 2.5; .5 SOLUTION RESPIRATORY (INHALATION) at 19:28

## 2021-08-14 RX ADMIN — MIRTAZAPINE 7.5 MG: 7.5 TABLET ORAL at 21:36

## 2021-08-14 RX ADMIN — MAGNESIUM HYDROXIDE 200 ML: 1200 LIQUID ORAL at 16:13

## 2021-08-14 RX ADMIN — ANORECTAL OINTMENT: 15.7; .44; 24; 20.6 OINTMENT TOPICAL at 21:37

## 2021-08-14 RX ADMIN — VENLAFAXINE 37.5 MG: 37.5 TABLET ORAL at 16:13

## 2021-08-14 RX ADMIN — HEPARIN SODIUM 5000 UNITS: 5000 INJECTION, SOLUTION INTRAVENOUS; SUBCUTANEOUS at 05:56

## 2021-08-14 RX ADMIN — VENLAFAXINE 37.5 MG: 37.5 TABLET ORAL at 11:09

## 2021-08-14 NOTE — PROGRESS NOTES
RT PROGRESS NOTE    DATA  CURRENT SETTINGS --  Ventilation Mode: Trach collar  FiO2 (%): 21 %  Oxygen Concentration (%): 30 %  Resp: 18     TRACH TYPE -- Bivona #6 Cuffed (7/28)     ACTION  THERAPIES -- BID Mucomyst, Duoneb, NaCl 3%   SUCTION   FREQUENCY -- 2    AMOUNT -- large    CONSISTENCY -- white    COLOR -- thick    SPONTANEOUS COUGH -- good   WEAN PHASE #2   MODE -- HTD 30 LPM 21%   DURATION -- 24/7   END REASON -- N/A    RESPONSE  BREATH SOUNDS --  VITAL SIGNS --  Temp:  [97.3  F (36.3  C)-98.3  F (36.8  C)] 98.3  F (36.8  C)  Pulse:  [77-90] 77  Resp:  [18-24] 18  BP: (121-129)/(76-92) 121/76  FiO2 (%):  [21 %-35 %] 21 %  SpO2:  [88 %-98 %] 94 %   EMOTIONAL CONCERNS -- No   RISK FOR SELF-DECANNULATION -- No     NOTE   No signs of respiratory distress. RT will continue to monitor.      Kayla Small, RT on 8/14/2021 at 5:08 AM

## 2021-08-14 NOTE — PLAN OF CARE
Problem: Restraint for Non-Violent/Non-Self-Destructive Behavior  Goal: Prevent/Manage Potential Problems  Description: Maintain safety of patient and others during period of restraint.  Promote psychological and physical wellbeing.  Prevent injury to skin and involved body parts.  Promote nutrition, hydration, and elimination.  Outcome: No Change    Patient  continue to manifest impulsiveness and pulling on tubes and lines at times. Bilateral mittens are in place per MD's order and patient's protection from harm.    Problem: Device-Related Complication Risk (Artificial Airway)  Goal: Optimal Device Function  Intervention: Optimize Device Care and Function  Recent Flowsheet Documentation  Taken 8/13/2021 1700 by Lakshmi Esqueda RN  Airway Safety Measures:   all equipment/monitors on and audible   manual resuscitator/mask/valve in room   suction at bedside   suction regulator   suction equipment   oxygen flowmeter     Problem: Communication Impairment (Artificial Airway)  Goal: Effective Communication  Outcome: No Change     Problem: Device-Related Complication Risk (Mechanical Ventilation, Invasive)  Goal: Optimal Device Function  Intervention: Optimize Device Care and Function  Recent Flowsheet Documentation  Taken 8/13/2021 1700 by Lakshmi Esqueda RN  Airway Safety Measures:   all equipment/monitors on and audible   manual resuscitator/mask/valve in room   suction at bedside   suction regulator   suction equipment   oxygen flowmeter    He was on trache mask with PMV during days so he was able to talk feebly at times.  He will be cuff up during the night. He is alert to self but seemingly confused to other spheres. He is cooperative with cares during this evening shift.     Problem: Adult Inpatient Plan of Care  Goal: Absence of Hospital-Acquired Illness or Injury  Intervention: Prevent and Manage VTE (Venous Thromboembolism) Risk  Recent Flowsheet Documentation  Taken 8/13/2021 1700 by Dheeraj  Lakshmi RAMEY RN  VTE Prevention/Management: anticoagulant therapy maintained   Patient is on Heparin 5,000 Units for his anticoagulation as ordered by MD.  Will keep monitoring patient's progress and safety.

## 2021-08-14 NOTE — PLAN OF CARE
"  Problem: Device-Related Complication Risk (Artificial Airway)  Goal: Optimal Device Function  Outcome: No Change     Problem: Skin and Tissue Injury (Artificial Airway)  Goal: Absence of Device-Related Skin or Tissue Injury  Outcome: No Change     Problem: Communication Impairment (Artificial Airway)  Goal: Effective Communication  Outcome: Improving   RT PROGRESS NOTE     DATA:     CURRENT SETTINGS:             TRACH TYPE / SIZE:  #6 bivona due7/28             MODE:   30L 30% tm with cuff inflated                ACTION:             THERAPIES:   duoneb bid, mucomyst bid, saline bid, metaneb bid             SUCTION:                           FREQUENCY:   x3                        AMOUNT:   small to large                        CONSISTENCY:   thick                        COLOR:   white to pale yellow             SPONTANEOUS COUGH EFFORT/STRENGTH OF EFFORT (not elicited by suctioning): moderate                              WEANING PHASE:   2                        WEAN MODE:    pmv                        WEAN TIME:   started at 08:25                            RESPONSE:             BS:   clear and diminished             VITAL SIGNS:   Blood pressure 120/74, pulse 83, temperature 98.1  F (36.7  C), temperature source Axillary, resp. rate 24, height 1.803 m (5' 10.98\"), weight 68.2 kg (150 lb 5 oz), SpO2 95 %.                 EMOTIONAL NEEDS / CONCERNS:  no                RISK FOR SELF DECANNULATION:  yes                        RISK DUE TO:  impulsive and confused                        INTERVENTION/S IN PLACE IS/ARE:  bilateral mitts       NOTE / PLAN:   continue to support.    "

## 2021-08-14 NOTE — PLAN OF CARE
Problem: Restraint for Non-Violent/Non-Self-Destructive Behavior  Goal: Prevent/Manage Potential Problems  Description: Maintain safety of patient and others during period of restraint.  Promote psychological and physical wellbeing.  Prevent injury to skin and involved body parts.  Promote nutrition, hydration, and elimination.  Outcome: No Change     Problem: Adult Inpatient Plan of Care  Goal: Optimal Comfort and Wellbeing  Outcome: Improving   Awake, responding to questions appropriately. ON TM, desats occasionally. Secretions suctioned x1, thick , white, moderate in amount. Incontinent of urine. Impulsive, on mittens bilateral. Wife and daughter at bedside.  Petr Patton, RN

## 2021-08-14 NOTE — PROGRESS NOTES
[facility]    Medicine Progress Note - Hospitalist Service       Date of Admission:  6/11/2021    Summary:    Dayron Neri is a 70 year male with h/o HTN who presented to ED on 5/15/2021 with acute SAH after a fall with unresponsiveness.  He underwent emergent left external ventricular drain placement.  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  On 5/28/2021, patient had persistent cerebral vasospasm, CT head showed bilateral BAYLEE infarct and was treated with milrinone and verapamil.  On 6/1/2021, angiogram showed no evidence of vasospasm.  Extubated on 6/3/2021.  6/6/2021 he was reintubated due to hypoxia.  On 6/7/2021, patient underwent tracheostomy and PEG tube placement.  EVD removed 6/9/2021.   Patient was treated for possible pneumonia with Unasyn on 5/20/2021, It was switched to ceftriaxone the next day for sputum culture growing Klebsiella. On 5/23, sputum culture also grew Pseudomonas. Antibiotic was switched to Zosyn.  Due to increased WBCs in CSF, possibility of ventriculitis was raised. CSF culture was negative. Antibiotic was switched to cefepime and vancomycin on 5/28/2021 for 2 weeks.  Vanco was discontinued on 6/9/2021.  On 6/5/2021, sputum grew ESBL so cefepime was switched to meropenem. He was transferred to [facility] on 6/11/21.       8/14 :     Clinically stable  Respiratory status stable - on 30 liters  On Phase 2 wean~continous heated trach mask with hi rosemarie 30% Fi02 tolerated PMV days.    On Duo-neb, four times a day sodium chloride neb two times a day for  pulmonary hygiene.    Frequent suctioning 5-12x/shift.      Neuro status stable  Needing b/l mittens    Ionized calcium very mildly elevated at 1.35  Corrected calcium at 12  PTH normal, vitamin D level mildly low  No new issues noted  Discussed with dietary team  Tube feeds have already been switched from Vital 1.5 to Katefarms - which has lower  calcium content of about 800 mg  Discussed with nephrology  Will change free water to normal saline - 200 ml every 2 hourly          Discussed with wife on phone and answered questions.    Barriers to discharge : still needing frequent suctioning and needing mittens        Assessment & Plan               Acute on chronic hypoxemic respiratory failure: S/p treatment pseudomonas ESBL and klebsiella pneumonia. Weaning has been slow and the biggest barrier for decannulation is excessive secretions  Glycopyrrolate was tried and stopped.  Also diuresed him with Lasix for 3 doses with some improvement however that improvement seems to have stabilized or plateaued.  Speaking valve is being attempted in short trials but does aspirate with blue dye test so needs to be only with speech/RT present.  Currently on phase 2 weaning with TM/PMV during day and is off the vent at night.   - Continue to wean per RT and pulmonary.   - Continue  metaneb, acetylcysteine, and 3% saline nebs   - Continue Scopolamine (started 7/20/2021)    Ventilator associated pneumonia: Completed antibiotics on July 18.    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 sputum culture 6/19 showed MDR pseudomonas. On Tobramycin nebs 6/24/2021 - 7/9/2021.   - Continue pulmonary toilet, scheduled duonebs    Traumatic brain injury/intracranial hemorrhage: 6/14/21 CT head done for fatigue and read as slight increase in caliber of lateral and third ventricle with possibility of developing communicating hydrocephalus. [facility] who did not think there was much change. CT head repeated on 6/16/21, which did not show any change.  - Need follow up with Dr. Martinez with Neurosurgery in 3 months with repeat CTA of head and neck for post-op f/u.    Dysphagia: Patient failed the blue dye test 7/20/21 with immediate aspiration, continue to use tube feeds. Speech therapy  following.     Acute metabolic encephalopathy: improved.   - Continue Ritalin and effexor     Anemia: hemoglobin stable. Continue to monitor.     Essential hypertension: BP controlled. On lisinopril    Severe debility, weakness, critical illness, deconditioning, Continue PT/OT    Hypercalcemia - unclear etiology, mild at this point. Parathyroid hormone normal. May be related to relative immobilization as he is walking with therapy but only 1-2 times a day. Monitor.    Insomnia: is on Ritalin which was decreased to once daily due to insomnia when taken later in day.    - Continue nighttime mirtazapine 7.5 mg scheduled.       Diet: Adult Formula Drip Feeding: Continuous [employer] Peptide 1.5; Gastrostomy; Goal Rate: 90; mL/hr; Medication - Feeding Tube Flush Frequency: At least 15-30 mL water before and after medication administration and with tube clogging; Amount to Send ...    DVT Prophylaxis: Heparin SQ  Gutierrez Catheter: Not present  Central Lines: None  Code Status: Full Code      Disposition Plan   Expected discharge: to be determined    The patient's care was discussed with the Bedside Nurse and Care Coordinator/.    Isreal Granda MD  Hospitalist Service  [facility]  Securely message with the Vocera Web Console (learn more here)  Text page via vozero Paging/Directory      ______________________________________________________________________      Data reviewed today: I reviewed all medications, new labs and imaging results over the last 24 hours.     Physical Exam   Vital Signs: Temp: 98.1  F (36.7  C) Temp src: Axillary BP: 120/74 Pulse: 84   Resp: 24 SpO2: 95 % O2 Device: Trach dome Oxygen Delivery: 30 LPM  Weight: 150 lbs 5 oz    General appearance: not in acute distress  HEENT: PERRL, EOMI  Lungs: Clear breath sounds in bilateral lung fields  Cardiovascular: Regular rate and rhythm, normal S1-S2  Abdomen: Soft, non tender, no distension  Musculoskeletal: No joint swelling  Skin: No  rash and no edema  Neurology: AAOx3, Cranial nerves II - XII normal. Moves bilateral arms. Able to lift bilateral legs off the floor.    Data   Recent Labs   Lab 08/14/21  0704 08/13/21  0604 08/11/21  0610 08/10/21  2030 08/09/21  0615   WBC  --   --   --   --  8.0   HGB  --   --   --   --  10.7*   MCV  --   --   --   --  96   PLT  --   --   --   --  289   NA  --  141 143  --  142   POTASSIUM  --  4.5 4.4  --  4.5   CHLORIDE  --  103 103  --  101   CO2  --  28 32*  --  30   BUN  --  46* 43*  --  42*   CR  --  0.87 0.88  --  0.82   ANIONGAP  --  10 8  --  11   RAGINI 11.2* 11.0* 11.2*  --  11.1*   GLC  --  110 115 108 118   ALBUMIN  --  2.9* 2.9*  --   --        Restraint:    Patient pulls his IV line and trach.     Within an hour after restraint an in person face to face assessment was completed at 9:30 AM, including an evaluation of the patient's immediate reaction to the intervention, behavioral assessment and review/assessment of history, drugs and medications, recent labs, etc., and behavioral condition.  The patient experienced: No adverse physical outcome from seclusion/restraint initiation.  The intervention of restraint or seclusion needs to continue.

## 2021-08-14 NOTE — PLAN OF CARE
Problem: Device-Related Complication Risk (Artificial Airway)  Goal: Optimal Device Function  Outcome: Improving  Intervention: Optimize Device Care and Function  Recent Flowsheet Documentation  Taken 8/13/2021 2001 by Kayla Small RT  Airway Safety Measures:   all equipment/monitors on and audible   manual resuscitator/mask/valve in room   suction at bedside   suction regulator   suction equipment   oxygen flowmeter     Problem: Skin and Tissue Injury (Artificial Airway)  Goal: Absence of Device-Related Skin or Tissue Injury  Outcome: Improving     Problem: Communication Impairment (Artificial Airway)  Goal: Effective Communication  Outcome: Improving     Problem: Device-Related Complication Risk (Mechanical Ventilation, Invasive)  Goal: Optimal Device Function  Intervention: Optimize Device Care and Function  Recent Flowsheet Documentation  Taken 8/13/2021 2001 by Kayla Small, RT  Airway Safety Measures:   all equipment/monitors on and audible   manual resuscitator/mask/valve in room   suction at bedside   suction regulator   suction equipment   oxygen flowmeter

## 2021-08-15 LAB
CALCIUM SERPL-MCNC: 10.7 MG/DL (ref 8.5–10.5)
CALCIUM UR-MCNC: 11.4 MG/DL
CALCIUM, IONIZED MEASURED: 1.4 MMOL/L (ref 1.11–1.3)
CALCIUM/CREAT UR: 0.38 G/G CR
CREAT UR-MCNC: 30 MG/DL
ION CA PH 7.4: 1.42 MMOL/L (ref 1.11–1.3)
PH: 7.42 (ref 7.35–7.45)

## 2021-08-15 PROCEDURE — 250N000011 HC RX IP 250 OP 636

## 2021-08-15 PROCEDURE — 250N000013 HC RX MED GY IP 250 OP 250 PS 637: Performed by: HOSPITALIST

## 2021-08-15 PROCEDURE — 94660 CPAP INITIATION&MGMT: CPT

## 2021-08-15 PROCEDURE — 94668 MNPJ CHEST WALL SBSQ: CPT

## 2021-08-15 PROCEDURE — 36415 COLL VENOUS BLD VENIPUNCTURE: CPT | Performed by: INTERNAL MEDICINE

## 2021-08-15 PROCEDURE — 250N000013 HC RX MED GY IP 250 OP 250 PS 637: Performed by: INTERNAL MEDICINE

## 2021-08-15 PROCEDURE — 250N000009 HC RX 250: Performed by: INTERNAL MEDICINE

## 2021-08-15 PROCEDURE — 999N000157 HC STATISTIC RCP TIME EA 10 MIN

## 2021-08-15 PROCEDURE — 82330 ASSAY OF CALCIUM: CPT | Performed by: INTERNAL MEDICINE

## 2021-08-15 PROCEDURE — 94640 AIRWAY INHALATION TREATMENT: CPT

## 2021-08-15 PROCEDURE — 999N000009 HC STATISTIC AIRWAY CARE

## 2021-08-15 PROCEDURE — 82310 ASSAY OF CALCIUM: CPT | Performed by: INTERNAL MEDICINE

## 2021-08-15 PROCEDURE — 94640 AIRWAY INHALATION TREATMENT: CPT | Mod: 76

## 2021-08-15 PROCEDURE — 99233 SBSQ HOSP IP/OBS HIGH 50: CPT | Performed by: INTERNAL MEDICINE

## 2021-08-15 PROCEDURE — 250N000013 HC RX MED GY IP 250 OP 250 PS 637

## 2021-08-15 PROCEDURE — 82306 VITAMIN D 25 HYDROXY: CPT | Performed by: INTERNAL MEDICINE

## 2021-08-15 PROCEDURE — 120N000017 HC R&B RESPIRATORY CARE

## 2021-08-15 PROCEDURE — 82340 ASSAY OF CALCIUM IN URINE: CPT | Performed by: INTERNAL MEDICINE

## 2021-08-15 RX ADMIN — LISINOPRIL 40 MG: 20 TABLET ORAL at 18:08

## 2021-08-15 RX ADMIN — VENLAFAXINE 37.5 MG: 37.5 TABLET ORAL at 16:20

## 2021-08-15 RX ADMIN — ANORECTAL OINTMENT: 15.7; .44; 24; 20.6 OINTMENT TOPICAL at 09:49

## 2021-08-15 RX ADMIN — MAGNESIUM HYDROXIDE 200 ML: 1200 LIQUID ORAL at 16:20

## 2021-08-15 RX ADMIN — METHYLPHENIDATE HYDROCHLORIDE 10 MG: 10 TABLET ORAL at 06:03

## 2021-08-15 RX ADMIN — ANORECTAL OINTMENT: 15.7; .44; 24; 20.6 OINTMENT TOPICAL at 13:29

## 2021-08-15 RX ADMIN — MAGNESIUM HYDROXIDE: 1200 LIQUID ORAL at 08:33

## 2021-08-15 RX ADMIN — ACETYLCYSTEINE 2 ML: 200 SOLUTION ORAL; RESPIRATORY (INHALATION) at 19:44

## 2021-08-15 RX ADMIN — VENLAFAXINE 37.5 MG: 37.5 TABLET ORAL at 11:49

## 2021-08-15 RX ADMIN — MAGNESIUM HYDROXIDE 200 ML: 1200 LIQUID ORAL at 02:07

## 2021-08-15 RX ADMIN — FAMOTIDINE 20 MG: 20 TABLET, FILM COATED ORAL at 08:33

## 2021-08-15 RX ADMIN — VENLAFAXINE 37.5 MG: 37.5 TABLET ORAL at 08:33

## 2021-08-15 RX ADMIN — ANORECTAL OINTMENT: 15.7; .44; 24; 20.6 OINTMENT TOPICAL at 20:19

## 2021-08-15 RX ADMIN — SODIUM CHLORIDE 30 MG/ML INHALATION SOLUTION 3 ML: 30 SOLUTION INHALANT at 08:30

## 2021-08-15 RX ADMIN — HEPARIN SODIUM 5000 UNITS: 5000 INJECTION, SOLUTION INTRAVENOUS; SUBCUTANEOUS at 21:45

## 2021-08-15 RX ADMIN — MAGNESIUM HYDROXIDE 200 ML: 1200 LIQUID ORAL at 11:49

## 2021-08-15 RX ADMIN — MIRTAZAPINE 7.5 MG: 7.5 TABLET ORAL at 20:19

## 2021-08-15 RX ADMIN — MAGNESIUM HYDROXIDE: 1200 LIQUID ORAL at 18:08

## 2021-08-15 RX ADMIN — ACETYLCYSTEINE 2 ML: 200 SOLUTION ORAL; RESPIRATORY (INHALATION) at 08:16

## 2021-08-15 RX ADMIN — IPRATROPIUM BROMIDE AND ALBUTEROL SULFATE 3 ML: 2.5; .5 SOLUTION RESPIRATORY (INHALATION) at 08:16

## 2021-08-15 RX ADMIN — MAGNESIUM HYDROXIDE 200 ML: 1200 LIQUID ORAL at 01:04

## 2021-08-15 RX ADMIN — HEPARIN SODIUM 5000 UNITS: 5000 INJECTION, SOLUTION INTRAVENOUS; SUBCUTANEOUS at 06:03

## 2021-08-15 RX ADMIN — FAMOTIDINE 20 MG: 20 TABLET, FILM COATED ORAL at 20:19

## 2021-08-15 RX ADMIN — MAGNESIUM HYDROXIDE 200 ML: 1200 LIQUID ORAL at 13:30

## 2021-08-15 RX ADMIN — MAGNESIUM HYDROXIDE 200 ML: 1200 LIQUID ORAL at 09:51

## 2021-08-15 RX ADMIN — MAGNESIUM HYDROXIDE 200 ML: 1200 LIQUID ORAL at 06:02

## 2021-08-15 RX ADMIN — IPRATROPIUM BROMIDE AND ALBUTEROL SULFATE 3 ML: 2.5; .5 SOLUTION RESPIRATORY (INHALATION) at 19:44

## 2021-08-15 RX ADMIN — MAGNESIUM HYDROXIDE 200 ML: 1200 LIQUID ORAL at 21:58

## 2021-08-15 RX ADMIN — HEPARIN SODIUM 5000 UNITS: 5000 INJECTION, SOLUTION INTRAVENOUS; SUBCUTANEOUS at 13:30

## 2021-08-15 RX ADMIN — SODIUM CHLORIDE 30 MG/ML INHALATION SOLUTION 3 ML: 30 SOLUTION INHALANT at 19:45

## 2021-08-15 RX ADMIN — MAGNESIUM HYDROXIDE 200 ML: 1200 LIQUID ORAL at 04:05

## 2021-08-15 RX ADMIN — MAGNESIUM HYDROXIDE 200 ML: 1200 LIQUID ORAL at 20:19

## 2021-08-15 RX ADMIN — MAGNESIUM HYDROXIDE 200 ML: 1200 LIQUID ORAL at 23:59

## 2021-08-15 ASSESSMENT — MIFFLIN-ST. JEOR: SCORE: 1476.97

## 2021-08-15 NOTE — PROGRESS NOTES
[facility]    Medicine Progress Note - Hospitalist Service       Date of Admission:  6/11/2021    Summary:    Dayron Neri is a 70 year male with h/o HTN who presented to ED on 5/15/2021 with acute SAH after a fall with unresponsiveness.  He underwent emergent left external ventricular drain placement.  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  On 5/28/2021, patient had persistent cerebral vasospasm, CT head showed bilateral BAYLEE infarct and was treated with milrinone and verapamil.  On 6/1/2021, angiogram showed no evidence of vasospasm.  Extubated on 6/3/2021.  6/6/2021 he was reintubated due to hypoxia.  On 6/7/2021, patient underwent tracheostomy and PEG tube placement.  EVD removed 6/9/2021.   Patient was treated for possible pneumonia with Unasyn on 5/20/2021, It was switched to ceftriaxone the next day for sputum culture growing Klebsiella. On 5/23, sputum culture also grew Pseudomonas. Antibiotic was switched to Zosyn.  Due to increased WBCs in CSF, possibility of ventriculitis was raised. CSF culture was negative. Antibiotic was switched to cefepime and vancomycin on 5/28/2021 for 2 weeks.  Vanco was discontinued on 6/9/2021.  On 6/5/2021, sputum grew ESBL so cefepime was switched to meropenem. He was transferred to [facility] on 6/11/21.       8/15 :     Clinically stable  Respiratory status stable - on 30%,  30 liters  On Phase 2 wean~continous heated trach mask with hi rosemarie 30% Fi02 tolerated PMV days.    On Duo-neb, four times a day sodium chloride neb two times a day for  pulmonary hygiene.    Still having significant secretions, Frequent suctioning 5-12x/shift.      Neuro status stable  Needing b/l mittens    Ionized calcium very mildly elevated at 1.4  Corrected calcium at 12  PTH normal, vitamin D level mildly low  No new issues noted  Discussed with dietary team  Tube feeds have already been switched from  Vital 1.5 to Katefarms - which has lower calcium content of about 800 mg  Discussed with nephrology  Will change free water to normal saline - 200 ml every 2 hourly        Barriers to discharge : still needing high flow oxygen, frequent suctioning and needing mittens, hypercalcemia        Assessment & Plan               Acute on chronic hypoxemic respiratory failure: S/p treatment pseudomonas ESBL and klebsiella pneumonia. Weaning has been slow and the biggest barrier for decannulation is excessive secretions  Glycopyrrolate was tried and stopped.  Also diuresed him with Lasix for 3 doses with some improvement however that improvement seems to have stabilized or plateaued.  Speaking valve is being attempted in short trials but does aspirate with blue dye test so needs to be only with speech/RT present.  Currently on phase 2 weaning with TM/PMV during day and is off the vent at night.   - Continue to wean per RT and pulmonary.   - Continue  metaneb, acetylcysteine, and 3% saline nebs   - Continue Scopolamine (started 7/20/2021)     Ventilator associated pneumonia: Completed antibiotics on July 18.    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 sputum culture 6/19 showed MDR pseudomonas. On Tobramycin nebs 6/24/2021 - 7/9/2021.   - Continue pulmonary toilet, scheduled duonebs    Traumatic brain injury/intracranial hemorrhage: 6/14/21 CT head done for fatigue and read as slight increase in caliber of lateral and third ventricle with possibility of developing communicating hydrocephalus. Discussed with Dr. Casillas (neurosurgeon at [facility]), who did not think there was much change. CT head repeated on 6/16/21, which did not show any change.  - Need follow up with Dr. Martinez with Neurosurgery in 3 months with repeat CTA of head and neck for post-op f/u.    Dysphagia: Patient failed the blue dye test 7/20/21 with immediate aspiration, continue to use tube feeds.  Speech therapy following.  Still having significant secretions, keep NPO due to concerns with aspiration.     Acute metabolic encephalopathy: improved.   - Continue Ritalin and effexor    Hypercalcemia : Calcium has been high normal since early July 1 noted 7/5/2021 @ 10.6, ionized calcium 1.4, Progressively worsening to 11s, with ionized calcium of 1.4-1.45  PTH 26 on 8/11/2021, vitamin D level low at 25, Has been on Ritalin since mid July calcium was already mildly elevated before that, no other supplements noted. Possibly secondary to immobilization prolonged hospitalization, PTH low normal and not suppressed in the setting of hypercalcemia. No history suggestive of any lymphoma or lymphadenopathy or any granulomatosis related disorders to suggest any additional etiology.  -- 1,25 vit D and urine calcium to creat ordered   --Tube feeds have already been switched from Vital 1.5 to Katefarms - which has lower calcium content of about 800 mg  -- Started on  every 2 hours enterally on 8/14/2021  Noted to have small decrease in calcium on 8/15  to 10.7   will continue NS for flushes with tube feed 200 cc every 2 hours for another 24 hours,   monitor calcium daily labs     Anemia: hemoglobin stable. Continue to monitor.     Essential hypertension: BP controlled. On lisinopril    Severe debility, weakness, critical illness, deconditioning, Continue PT/OT    Insomnia: is on Ritalin which was decreased to once daily due to insomnia when taken later in day.    - Continue nighttime mirtazapine 7.5 mg scheduled.       Diet: Adult Formula Drip Feeding: Continuous [employer] Peptide 1.5; Gastrostomy; Goal Rate: 90; mL/hr; Medication - Feeding Tube Flush Frequency: At least 15-30 mL water before and after medication administration and with tube clogging; Amount to Send ...    DVT Prophylaxis: Heparin SQ  Gutierrez Catheter: Not present  Central Lines: None  Code Status: Full Code      Disposition Plan   Expected discharge: to  be determined    The patient's care was discussed with the Bedside Nurse and Care Coordinator/.    Isreal Granda MD  Hospitalist Service  [facility]  Securely message with the Vocera Web Console (learn more here)  Text page via [place] Paging/Directory      ______________________________________________________________________      Data reviewed today: I reviewed all medications, new labs and imaging results over the last 24 hours.     Physical Exam   Vital Signs: Temp: 98.8  F (37.1  C) Temp src: Axillary BP: 134/74 Pulse: (P) 92   Resp: (P) 22 SpO2: (P) 95 % O2 Device: (P) Trach dome Oxygen Delivery: (P) 30 LPM  Weight: 151 lbs .6 oz    General appearance: not in acute distress  HEENT: PERRL, EOMI  Lungs: Clear breath sounds in bilateral lung fields  Cardiovascular: Regular rate and rhythm, normal S1-S2  Abdomen: Soft, non tender, no distension  Musculoskeletal: No joint swelling  Skin: No rash and no edema  Neurology: AAOx3, Cranial nerves II - XII normal. Moves bilateral arms. Able to lift bilateral legs off the floor.    Data   Recent Labs   Lab 08/15/21  0716 08/14/21  0704 08/13/21  0604 08/11/21  0610 08/10/21  2030 08/09/21  0615   WBC  --   --   --   --   --  8.0   HGB  --   --   --   --   --  10.7*   MCV  --   --   --   --   --  96   PLT  --   --   --   --   --  289   NA  --   --  141 143  --  142   POTASSIUM  --   --  4.5 4.4  --  4.5   CHLORIDE  --   --  103 103  --  101   CO2  --   --  28 32*  --  30   BUN  --   --  46* 43*  --  42*   CR  --   --  0.87 0.88  --  0.82   ANIONGAP  --   --  10 8  --  11   RAGINI 10.7* 11.2* 11.0* 11.2*  --  11.1*   GLC  --   --  110 115 108 118   ALBUMIN  --   --  2.9* 2.9*  --   --        Restraint:    Patient pulls his IV line and trach.     Within an hour after restraint an in person face to face assessment was completed at 9:30 AM, including an evaluation of the patient's immediate reaction to the intervention, behavioral assessment and  review/assessment of history, drugs and medications, recent labs, etc., and behavioral condition.  The patient experienced: No adverse physical outcome from seclusion/restraint initiation.  The intervention of restraint or seclusion needs to continue.

## 2021-08-15 NOTE — CONSULTS
NEPHROLOGY CONSULTATION      ASSESSMENT/PLAN:  70 year old male with past medical history of hypertension, admitted with subarachnoid hemorrhage after a fall on 5/15/2021 s/p emergent left external ventricular drain placement with posterior communicating artery aneurysm defied structured underwent craniotomy with clipping of aneurysm , complicated by persistent cerebral vasospasms and bilateral BAYLEE infarct.  Course also complicated by respiratory failure with intubation pneumonia was treated with cefepime and Vanco, transferred to LTAC on 6/11/2021.  Nephrology consulted for hypercalcemia    Hypercalcemia  Preserved renal functions  Calcium has been high normal since early July 1 noted 7/5/2021 @ 10.6, ionized calcium 1.4  Progressively worsening to 11s, with ionized calcium of 1.4-1.45  PTH 26 on 8/11/2021, vitamin D level low at 25  Has been on Ritalin since mid July calcium was already mildly elevated before that, no other supplements noted  Possibly secondary to immobilization prolonged hospitalization, PTH low normal and not suppressed in the setting of hypercalcemia  No history suggestive of any lymphoma or lymphadenopathy or any granulomatosis related disorders to suggest any additional etiology.  -- 1,25 vit D and urine calcium to creat ordered   --Tube feeds were adjusted yesterday  -- Started on  every 2 hours enterally on 8/14/2021  Noted to have small decreased today to 10.7  --We will continue NS for flushes with tube feed 200 cc every 2 hours for another 24 hours,   -- monitor calcium daily labs    Renal function preserved  Creatinine based GFR of 89-90 creatinine 0.8  Unclear with prolonged illness, sarcopenia, if it is overestimated  --Continue to monitor , if any significant change or adjustment in medication dose as needed can consider checking 24-hour urine for exact clearance    Hypertension lisinopril 40 mg  euvolemic to mild volume depletion  Likely elevated insensible losses with  vent and tachypnea  --Increased enteral replacement late yesterday, with good response  --Blood pressure well controlled    Anemia  Inflammatory  Mild management per hospitalist medicine    Acute on chronic hypoxemic respiratory failure  S/p trach  ESBL Pseudomonas and Klebsiella pneumonia  Had tobramycin nebs for MDR Pseudomonas  Pulmonary following  Currently off antibiotics    Malnutrition on tube feed    SAH w fall sp craniotomy and hematoma evacuation , PCA aneurysm clip  cb BAYLEE stroke ad vasopasm  No on rehab    Insomnia/mood disorders  On Ritalin and mirtazapine  Management per primary medicine    Metabolic encephalopathy stable    Thank you for the consultation  We will follow  Deepthi Pradhan MD  [facility]  [phone]      CC: Hypercalcemia    REASON FOR CONSULTATION: We are asked to see pt by Dr. Granda    HISTORY OF PRESENT ILLNESS:70 year old male  70 year old male with past medical history of hypertension, admitted with subarachnoid hemorrhage after a fall on 5/15/2021 s/p emergent left external ventricular drain placement with posterior communicating artery aneurysm defied structured underwent craniotomy with clipping of aneurysm , complicated by persistent cerebral vasospasms and bilateral BAYLEE infarct.  Course also complicated by respiratory failure with intubation pneumonia was treated with cefepime and Vanco, transferred to LTAC on 6/11/2021.  Nephrology consulted for hypercalcemia  Patient nods yes and no and answers minimal questions , was able to follow commands with moving feet and moving arms pointing to his tracheostomy been respiratory with suctioning.  Does not appear to be confused today has been tolerating the flushes without any residual  Weights and I/O appears stable  Currently out of bed to chair  Received a call yesterday regarding hypercalcemia that has been persistent despite changing in tube feeds and increasing free water flushes, calcium was noted to be  high since early July , now trending higher to 11.  Improved to 10.7 this morning with normal saline flushes with increased rate to 200 every 2  No other new events reported, patient continues to need recurrent suctioning secondary to high secretions otherwise no fevers diarrhea or symptoms suggestive of high fluid loss.  He has been on Ritalin since 7/16/2021 calcium started to be elevated since 7/6/2021 labs.    REVIEW OF SYSTEMS:  ROS was completely reviewed and otherwise negative and non-contributory    Past Medical History:   Diagnosis Date     Acute respiratory failure with hypoxia and hypercapnia (H) 7/16/2021     Cognitive impairment 7/16/2021     Displacement of lumbar intervertebral disc without myelopathy 1994    Lumbar disc rupture, no surgery     Left-sided neglect 7/16/2021     Other dysphagia 7/16/2021       Social History     Socioeconomic History     Marital status:      Spouse name: Not on file     Number of children: Not on file     Years of education: Not on file     Highest education level: Not on file   Occupational History     Not on file   Tobacco Use     Smoking status: Never Smoker     Smokeless tobacco: Never Used   Substance and Sexual Activity     Alcohol use: Yes     Comment: moderate use     Drug use: No     Sexual activity: Not Currently   Other Topics Concern      Service Not Asked     Blood Transfusions No     Caffeine Concern Not Asked     Comment: 3-4 cups per day     Occupational Exposure Not Asked     Hobby Hazards Not Asked     Sleep Concern Not Asked     Stress Concern Not Asked     Weight Concern Not Asked     Special Diet Not Asked     Back Care Not Asked     Exercise Yes     Comment: walk 1 mile per day when weather permits     Bike Helmet Not Asked     Seat Belt Yes     Self-Exams Not Asked     Parent/sibling w/ CABG, MI or angioplasty before 65F 55M? No   Social History Narrative     Not on file     Social Determinants of Health     Financial Resource  Strain:      Difficulty of Paying Living Expenses:    Food Insecurity:      Worried About Running Out of Food in the Last Year:      Ran Out of Food in the Last Year:    Transportation Needs:      Lack of Transportation (Medical):      Lack of Transportation (Non-Medical):    Physical Activity:      Days of Exercise per Week:      Minutes of Exercise per Session:    Stress:      Feeling of Stress :    Social Connections:      Frequency of Communication with Friends and Family:      Frequency of Social Gatherings with Friends and Family:      Attends Faith Services:      Active Member of Clubs or Organizations:      Attends Club or Organization Meetings:      Marital Status:    Intimate Partner Violence:      Fear of Current or Ex-Partner:      Emotionally Abused:      Physically Abused:      Sexually Abused:        Family History   Problem Relation Age of Onset     Hypertension Mother      Cancer Mother         colon cancer @ 85     Diabetes Father         type 2 diabetes     Cancer Father         lung cancer     Hypertension Sister      Psychotic Disorder Sister         depression     Hypertension Sister      Hypertension Sister      Hypertension Sister        No Known Allergies    MEDICATIONS:    acetylcysteine  2 mL Nebulization BID     famotidine  20 mg Oral or Feeding Tube BID     heparin ANTICOAGULANT  5,000 Units Subcutaneous Q8H     ipratropium - albuterol 0.5 mg/2.5 mg/3 mL  3 mL Nebulization BID     lisinopril  40 mg Oral or Feeding Tube Daily     menthol-zinc oxide   Topical TID     methylphenidate  10 mg Oral or Feeding Tube QAM AC     mirtazapine  7.5 mg Per Feeding Tube At Bedtime     scopolamine  1 patch Transdermal Q72H    And     scopolamine   Transdermal Q8H     sodium chloride  3 mL Nebulization BID     sodium chloride 0.9% (bottle)   Irrigation Q2H     venlafaxine  37.5 mg Per Feeding Tube TID w/meals         PHYSICAL EXAM    /74 (BP Location: Left arm)   Pulse 91   Temp 98.8  F (37.1  " C) (Axillary)   Resp 22   Ht 1.803 m (5' 10.98\")   Wt 68.5 kg (151 lb 0.6 oz)   SpO2 96%   BMI 21.07 kg/m        Intake/Output Summary (Last 24 hours) at 8/15/2021 0738  Last data filed at 8/15/2021 0700  Gross per 24 hour   Intake 3063 ml   Output 2350 ml   Net 713 ml       awake and NAD  HEENT NC/AT; perrla; OP clear without lesions; mmm  Neck supple without LAD, TM  CV; RRR without rub or murmur  Lung: Rattling secondary to secretions, trach +  Ab: soft and NT; not distended; normal bs  Ext: no edema and well perfused  Skin; no rash  Neuro; grossly intact    LABORATORIES    Recent Labs   Lab 08/09/21  0615   WBC 8.0   HGB 10.7*   HCT 32.8*        Recent Labs   Lab 08/13/21  0604 08/11/21  0610 08/09/21  0615    143 142   CO2 28 32* 30   BUN 46* 43* 42*     No results for input(s): INR, PTT in the last 168 hours.    Invalid input(s): APTT  Invalid input(s): FERRITIN  No results for input(s): IRON in the last 168 hours.    Invalid input(s): TIBC    I reviewed all labs and imaging    Thank you for the consultation we will follow    Deepthi Pradhan MD  [facility]  [phone]      "

## 2021-08-15 NOTE — PLAN OF CARE
Problem: Device-Related Complication Risk (Artificial Airway)  Goal: Optimal Device Function  Outcome: Improving     Problem: Skin and Tissue Injury (Artificial Airway)  Goal: Absence of Device-Related Skin or Tissue Injury  Outcome: Improving     Problem: Communication Impairment (Artificial Airway)  Goal: Effective Communication  Outcome: Improving

## 2021-08-15 NOTE — PLAN OF CARE
Problem: Mechanical Ventilation  Goal: Patient will maintain patent airway  Outcome: Progressing  Goal: Tracheostomy will be managed safely  Outcome: Progressing    RT PROGRESS NOTE     DATA:     CURRENT SETTINGS:               TRACH TYPE / SIZE: #6 Bivona, placed 7/28/2021             MODE:  TM 30L, cuff up   FIO2:  30%     ACTION:             THERAPIES: Duo/ Mucomyst neb via Metaneb, Sodium Chloride neb given on scheduled time             SUCTION:                           FREQUENCY:                        AMOUNT:  Large x 2/ mod/ small                        CONSISTENCY: thick                        COLOR:   white             SPONTANEOUS COUGH EFFORT/STRENGTH OF EFFORT (not elicited by suctioning): not observed                            WEANING PHASE:   2                        WEAN MODE: TM 30% 30L/ PMV                        WEAN TIME: PMV cont since 0840                        END WEAN REASON:      RESPONSE:             BS:   coarse - clear decreased on L side, clear to clear decreased on R side after Sx              VITAL SIGNS: O2 sat 95-97%, HR 86-92, RR 20-22             EMOTIONAL NEEDS / CONCERNS:  None               RISK FOR SELF DECANNULATION:  Yes                        RISK DUE TO: Confusion                        INTERVENTION/S IN PLACE IS/ARE: Restraints x2       NOTE / PLAN:  cont current plan of care - TM with PMV during the day, PMV off for night, cuff up.

## 2021-08-15 NOTE — PROGRESS NOTES
RT PROGRESS NOTE    DATA  CURRENT SETTINGS --  Ventilation Mode: Trach collar  FiO2 (%): 30 %  Oxygen Concentration (%): 30 %  Resp: 20     TRACH TYPE -- Bivona #6 Cuffed 7/28    ACTION  THERAPIES -- BID Mucomyst, Duoneb and NaCl 3% with Metaneb   SUCTION   FREQUENCY -- 4   AMOUNT -- moderate   CONSISTENCY -- thick    COLOR -- white/cloudy    SPONTANEOUS COUGH -- strong   WEAN PHASE #2   MODE -- HTD 30 LPM 30%   DURATION -- 24/7   END REASON -- N/A    RESPONSE  BREATH SOUNDS -- clear to coarse   VITAL SIGNS --  Temp:  [97.9  F (36.6  C)-98.3  F (36.8  C)] 97.9  F (36.6  C)  Pulse:  [77-97] 88  Resp:  [18-24] 20  BP: (120-138)/(70-76) 124/72  FiO2 (%):  [21 %-30 %] 30 %  SpO2:  [85 %-100 %] 95 %   EMOTIONAL CONCERNS -- None   RISK FOR SELF-DECANNULATION -- N/A    NOTE   Patient is resting comfortably with no signs of respiratory distress. Wore PMV from 0825 to 1929 inline with HTD on 8/14. RT will continue to monitor.     Kayla Small, RT on 8/15/2021 at 3:18 AM

## 2021-08-16 ENCOUNTER — APPOINTMENT (OUTPATIENT)
Dept: SPEECH THERAPY | Facility: CLINIC | Age: 71
DRG: 207 | End: 2021-08-16
Attending: HOSPITALIST
Payer: COMMERCIAL

## 2021-08-16 ENCOUNTER — APPOINTMENT (OUTPATIENT)
Dept: OCCUPATIONAL THERAPY | Facility: CLINIC | Age: 71
DRG: 207 | End: 2021-08-16
Attending: HOSPITALIST
Payer: COMMERCIAL

## 2021-08-16 ENCOUNTER — APPOINTMENT (OUTPATIENT)
Dept: PHYSICAL THERAPY | Facility: CLINIC | Age: 71
DRG: 207 | End: 2021-08-16
Attending: HOSPITALIST
Payer: COMMERCIAL

## 2021-08-16 LAB
ANION GAP SERPL CALCULATED.3IONS-SCNC: 9 MMOL/L (ref 5–18)
BASOPHILS # BLD AUTO: 0 10E3/UL (ref 0–0.2)
BASOPHILS NFR BLD AUTO: 1 %
BUN SERPL-MCNC: 35 MG/DL (ref 8–22)
CALCIUM SERPL-MCNC: 10.7 MG/DL (ref 8.5–10.5)
CALCIUM, IONIZED MEASURED: 1.38 MMOL/L (ref 1.11–1.3)
CHLORIDE BLD-SCNC: 113 MMOL/L (ref 98–107)
CO2 SERPL-SCNC: 28 MMOL/L (ref 22–31)
CREAT SERPL-MCNC: 0.77 MG/DL (ref 0.7–1.3)
DEPRECATED CALCIDIOL+CALCIFEROL SERPL-MC: 30 UG/L (ref 30–80)
EOSINOPHIL # BLD AUTO: 0.1 10E3/UL (ref 0–0.7)
EOSINOPHIL NFR BLD AUTO: 2 %
ERYTHROCYTE [DISTWIDTH] IN BLOOD BY AUTOMATED COUNT: 13.8 % (ref 10–15)
GFR SERPL CREATININE-BSD FRML MDRD: >90 ML/MIN/1.73M2
GLUCOSE BLD-MCNC: 126 MG/DL (ref 70–125)
HCT VFR BLD AUTO: 29.5 % (ref 40–53)
HGB BLD-MCNC: 9.6 G/DL (ref 13.3–17.7)
IMM GRANULOCYTES # BLD: 0 10E3/UL
IMM GRANULOCYTES NFR BLD: 0 %
ION CA PH 7.4: 1.41 MMOL/L (ref 1.11–1.3)
LYMPHOCYTES # BLD AUTO: 1 10E3/UL (ref 0.8–5.3)
LYMPHOCYTES NFR BLD AUTO: 16 %
MAGNESIUM SERPL-MCNC: 2 MG/DL (ref 1.8–2.6)
MCH RBC QN AUTO: 31.7 PG (ref 26.5–33)
MCHC RBC AUTO-ENTMCNC: 32.5 G/DL (ref 31.5–36.5)
MCV RBC AUTO: 97 FL (ref 78–100)
MONOCYTES # BLD AUTO: 0.4 10E3/UL (ref 0–1.3)
MONOCYTES NFR BLD AUTO: 6 %
NEUTROPHILS # BLD AUTO: 5 10E3/UL (ref 1.6–8.3)
NEUTROPHILS NFR BLD AUTO: 75 %
NRBC # BLD AUTO: 0 10E3/UL
NRBC BLD AUTO-RTO: 0 /100
PH: 7.44 (ref 7.35–7.45)
PLATELET # BLD AUTO: 377 10E3/UL (ref 150–450)
POTASSIUM BLD-SCNC: 4.4 MMOL/L (ref 3.5–5)
RBC # BLD AUTO: 3.03 10E6/UL (ref 4.4–5.9)
SODIUM SERPL-SCNC: 150 MMOL/L (ref 136–145)
WBC # BLD AUTO: 6.7 10E3/UL (ref 4–11)

## 2021-08-16 PROCEDURE — 97112 NEUROMUSCULAR REEDUCATION: CPT | Mod: GP | Performed by: PHYSICAL THERAPIST

## 2021-08-16 PROCEDURE — 83735 ASSAY OF MAGNESIUM: CPT | Performed by: INTERNAL MEDICINE

## 2021-08-16 PROCEDURE — 250N000013 HC RX MED GY IP 250 OP 250 PS 637: Performed by: HOSPITALIST

## 2021-08-16 PROCEDURE — 80048 BASIC METABOLIC PNL TOTAL CA: CPT | Performed by: INTERNAL MEDICINE

## 2021-08-16 PROCEDURE — 250N000013 HC RX MED GY IP 250 OP 250 PS 637

## 2021-08-16 PROCEDURE — 97129 THER IVNTJ 1ST 15 MIN: CPT | Mod: GO | Performed by: OCCUPATIONAL THERAPIST

## 2021-08-16 PROCEDURE — 92507 TX SP LANG VOICE COMM INDIV: CPT | Mod: GN

## 2021-08-16 PROCEDURE — 92526 ORAL FUNCTION THERAPY: CPT | Mod: GN

## 2021-08-16 PROCEDURE — 99233 SBSQ HOSP IP/OBS HIGH 50: CPT | Performed by: INTERNAL MEDICINE

## 2021-08-16 PROCEDURE — 94003 VENT MGMT INPAT SUBQ DAY: CPT | Performed by: INTERNAL MEDICINE

## 2021-08-16 PROCEDURE — 250N000009 HC RX 250: Performed by: INTERNAL MEDICINE

## 2021-08-16 PROCEDURE — 94640 AIRWAY INHALATION TREATMENT: CPT

## 2021-08-16 PROCEDURE — 82330 ASSAY OF CALCIUM: CPT | Performed by: INTERNAL MEDICINE

## 2021-08-16 PROCEDURE — 97530 THERAPEUTIC ACTIVITIES: CPT | Mod: GO | Performed by: OCCUPATIONAL THERAPIST

## 2021-08-16 PROCEDURE — 36415 COLL VENOUS BLD VENIPUNCTURE: CPT | Performed by: INTERNAL MEDICINE

## 2021-08-16 PROCEDURE — 999N000157 HC STATISTIC RCP TIME EA 10 MIN

## 2021-08-16 PROCEDURE — 258N000003 HC RX IP 258 OP 636: Performed by: PHYSICIAN ASSISTANT

## 2021-08-16 PROCEDURE — 99207 PR CDG-CODE CATEGORY CHANGED: CPT | Performed by: INTERNAL MEDICINE

## 2021-08-16 PROCEDURE — 97530 THERAPEUTIC ACTIVITIES: CPT | Mod: GP | Performed by: PHYSICAL THERAPIST

## 2021-08-16 PROCEDURE — 94668 MNPJ CHEST WALL SBSQ: CPT

## 2021-08-16 PROCEDURE — 120N000017 HC R&B RESPIRATORY CARE

## 2021-08-16 PROCEDURE — 250N000011 HC RX IP 250 OP 636

## 2021-08-16 PROCEDURE — 85025 COMPLETE CBC W/AUTO DIFF WBC: CPT | Performed by: NURSE PRACTITIONER

## 2021-08-16 PROCEDURE — 999N000009 HC STATISTIC AIRWAY CARE

## 2021-08-16 PROCEDURE — 94640 AIRWAY INHALATION TREATMENT: CPT | Mod: 76

## 2021-08-16 PROCEDURE — 250N000013 HC RX MED GY IP 250 OP 250 PS 637: Performed by: INTERNAL MEDICINE

## 2021-08-16 PROCEDURE — 250N000011 HC RX IP 250 OP 636: Performed by: PHYSICIAN ASSISTANT

## 2021-08-16 PROCEDURE — 99233 SBSQ HOSP IP/OBS HIGH 50: CPT | Performed by: PHYSICIAN ASSISTANT

## 2021-08-16 PROCEDURE — 94660 CPAP INITIATION&MGMT: CPT

## 2021-08-16 RX ORDER — FUROSEMIDE 10 MG/ML
20 INJECTION INTRAMUSCULAR; INTRAVENOUS DAILY
Status: DISCONTINUED | OUTPATIENT
Start: 2021-08-17 | End: 2021-08-16

## 2021-08-16 RX ORDER — FUROSEMIDE 10 MG/ML
20 INJECTION INTRAMUSCULAR; INTRAVENOUS DAILY
Status: DISCONTINUED | OUTPATIENT
Start: 2021-08-16 | End: 2021-08-16

## 2021-08-16 RX ORDER — DEXTROSE MONOHYDRATE 50 MG/ML
INJECTION, SOLUTION INTRAVENOUS CONTINUOUS
Status: DISCONTINUED | OUTPATIENT
Start: 2021-08-16 | End: 2021-08-17

## 2021-08-16 RX ORDER — FUROSEMIDE 10 MG/ML
20 INJECTION INTRAMUSCULAR; INTRAVENOUS DAILY
Status: COMPLETED | OUTPATIENT
Start: 2021-08-16 | End: 2021-08-16

## 2021-08-16 RX ADMIN — VENLAFAXINE 37.5 MG: 37.5 TABLET ORAL at 08:23

## 2021-08-16 RX ADMIN — FUROSEMIDE 20 MG: 10 INJECTION, SOLUTION INTRAMUSCULAR; INTRAVENOUS at 12:28

## 2021-08-16 RX ADMIN — IPRATROPIUM BROMIDE AND ALBUTEROL SULFATE 3 ML: 2.5; .5 SOLUTION RESPIRATORY (INHALATION) at 07:35

## 2021-08-16 RX ADMIN — MAGNESIUM HYDROXIDE 200 ML: 1200 LIQUID ORAL at 06:13

## 2021-08-16 RX ADMIN — SCOPALAMINE 1 PATCH: 1 PATCH, EXTENDED RELEASE TRANSDERMAL at 14:13

## 2021-08-16 RX ADMIN — MAGNESIUM HYDROXIDE 200 ML: 1200 LIQUID ORAL at 04:03

## 2021-08-16 RX ADMIN — FAMOTIDINE 20 MG: 20 TABLET, FILM COATED ORAL at 21:06

## 2021-08-16 RX ADMIN — LISINOPRIL 40 MG: 20 TABLET ORAL at 18:10

## 2021-08-16 RX ADMIN — HEPARIN SODIUM 5000 UNITS: 5000 INJECTION, SOLUTION INTRAVENOUS; SUBCUTANEOUS at 21:05

## 2021-08-16 RX ADMIN — ACETYLCYSTEINE 2 ML: 200 SOLUTION ORAL; RESPIRATORY (INHALATION) at 19:17

## 2021-08-16 RX ADMIN — HEPARIN SODIUM 5000 UNITS: 5000 INJECTION, SOLUTION INTRAVENOUS; SUBCUTANEOUS at 14:14

## 2021-08-16 RX ADMIN — MAGNESIUM HYDROXIDE 30 ML: 400 SUSPENSION ORAL at 14:14

## 2021-08-16 RX ADMIN — MAGNESIUM HYDROXIDE 200 ML: 1200 LIQUID ORAL at 02:12

## 2021-08-16 RX ADMIN — ANORECTAL OINTMENT: 15.7; .44; 24; 20.6 OINTMENT TOPICAL at 08:23

## 2021-08-16 RX ADMIN — HEPARIN SODIUM 5000 UNITS: 5000 INJECTION, SOLUTION INTRAVENOUS; SUBCUTANEOUS at 06:13

## 2021-08-16 RX ADMIN — FAMOTIDINE 20 MG: 20 TABLET, FILM COATED ORAL at 08:23

## 2021-08-16 RX ADMIN — ANORECTAL OINTMENT: 15.7; .44; 24; 20.6 OINTMENT TOPICAL at 21:05

## 2021-08-16 RX ADMIN — MAGNESIUM HYDROXIDE 200 ML: 1200 LIQUID ORAL at 08:22

## 2021-08-16 RX ADMIN — DEXTROSE MONOHYDRATE: 50 INJECTION, SOLUTION INTRAVENOUS at 12:29

## 2021-08-16 RX ADMIN — METHYLPHENIDATE HYDROCHLORIDE 10 MG: 10 TABLET ORAL at 06:12

## 2021-08-16 RX ADMIN — VENLAFAXINE 37.5 MG: 37.5 TABLET ORAL at 12:28

## 2021-08-16 RX ADMIN — MIRTAZAPINE 7.5 MG: 7.5 TABLET ORAL at 21:06

## 2021-08-16 RX ADMIN — SODIUM CHLORIDE 30 MG/ML INHALATION SOLUTION 3 ML: 30 SOLUTION INHALANT at 19:17

## 2021-08-16 RX ADMIN — ANORECTAL OINTMENT: 15.7; .44; 24; 20.6 OINTMENT TOPICAL at 14:15

## 2021-08-16 RX ADMIN — IPRATROPIUM BROMIDE AND ALBUTEROL SULFATE 3 ML: 2.5; .5 SOLUTION RESPIRATORY (INHALATION) at 19:17

## 2021-08-16 RX ADMIN — VENLAFAXINE 37.5 MG: 37.5 TABLET ORAL at 18:10

## 2021-08-16 RX ADMIN — ACETYLCYSTEINE 2 ML: 200 SOLUTION ORAL; RESPIRATORY (INHALATION) at 07:35

## 2021-08-16 RX ADMIN — DEXTROSE MONOHYDRATE: 50 INJECTION, SOLUTION INTRAVENOUS at 22:34

## 2021-08-16 RX ADMIN — SODIUM CHLORIDE 30 MG/ML INHALATION SOLUTION 3 ML: 30 SOLUTION INHALANT at 07:53

## 2021-08-16 RX ADMIN — MAGNESIUM HYDROXIDE 200 ML: 1200 LIQUID ORAL at 10:15

## 2021-08-16 ASSESSMENT — MIFFLIN-ST. JEOR: SCORE: 1481.9

## 2021-08-16 NOTE — PLAN OF CARE
Plan of Care by Jacqueline Vieyra RN at 2021  9:23 AM     Author: Jacqueline Vieyra RN Service: -- Author Type: RN, Care Manager    Filed: 2021  9:33 AM Date of Service: 2021  9:23 AM Status: Signed    : Jacqueline Vieyra RN (RN, Care Manager)       Care Management Progression of Care Update        DR GLOS - Target D/C Date TBD        PLAN/GOALS  1. Pulmonary following. Phase 2 wean~continous heated trach mask with hi rosemarie 30% Fi02 tolerated PMV days.  Mucomyst, Duo-neb, and sodium chloride neb two times a day for pulmonary hygiene.  Frequent suctioning 4x/shift.    2.  Nutrition management.  Tube feeding via PEG placed 21.  Registered dietician to monitor tube feeding tolerance, weight and labs.    3.  Neurology following intermittently.    4.  Nephrology consulted for hypercalcemia.    4. PT/OT 5x/week.     5.  Speech therapy 5x/week. Continue effortful swallow exercises and voicing.    6.  Palliative following.           BARRIERS  1.  Acute hypoxic respiratory failure due to subarachnoid hemorrhage. Trach and oxygen wean.    2.  Traumatic brain injury/intracranial hemorrhage/acute ischemic stroke.    3.  Oropharyngeal dysphagia.    4.   Bilateral mitts for pulling at tubes and lines.    5.  Frequent suctioning of copious secretions.    6.  24 hour urine collection.        Disposition:  TCU  Care Manager Name:  Jacqueline Vieyra RN,BSN, HNB-BC    Date/Time:  21 @ 11:11 AM

## 2021-08-16 NOTE — PLAN OF CARE
Pt is alert, denied pain and appeared comfortable. Lungs are coarse. Pt is suctioned for large amounts of white secretions. New IV started, pt now on d%@100cc/hr. 24 hour urine collection started at 14:30pm. No recent BM documented. MOM given this shift. Bowel sound positive, positive flatus. Sat up in the chair for 3 hours today and tolerated well.   Problem: Adult Inpatient Plan of Care  Goal: Plan of Care Review  Outcome: No Change     Problem: Anxiety  Goal: Anxiety Reduction or Resolution  Outcome: No Change     Problem: Adult Inpatient Plan of Care  Goal: Absence of Hospital-Acquired Illness or Injury  Intervention: Identify and Manage Fall Risk  Recent Flowsheet Documentation  Taken 8/16/2021 0900 by Erika Mccloud, RN  Safety Promotion/Fall Prevention:    bed alarm on    increased rounding and observation    room door open    room near nurse's station    safety round/check completed  Intervention: Prevent Infection  Recent Flowsheet Documentation  Taken 8/16/2021 0900 by Erika Mccloud, MARCO  Infection Prevention:    hand hygiene promoted    single patient room provided    visitors restricted/screened    rest/sleep promoted     Problem: Device-Related Complication Risk (Mechanical Ventilation, Invasive)  Goal: Optimal Device Function  Intervention: Optimize Device Care and Function  Recent Flowsheet Documentation  Taken 8/16/2021 0900 by Erika Mccloud, RN  Airway Safety Measures:    all equipment/monitors on and audible    manual resuscitator at bedside    manual resuscitator/mask/valve in room    suction at bedside    suction regulator     Problem: Ventilator-Induced Lung Injury (Mechanical Ventilation, Invasive)  Goal: Absence of Ventilator-Induced Lung Injury  Intervention: Prevent Ventilator-Associated Pneumonia  Recent Flowsheet Documentation  Taken 8/16/2021 0840 by Erika Mccloud, RN  Oral Care: teeth brushed     Problem: Device-Related Complication Risk (Artificial Airway)  Goal: Optimal Device  Function  Intervention: Optimize Device Care and Function  Recent Flowsheet Documentation  Taken 8/16/2021 0900 by Erika Mccloud, RN  Airway Safety Measures:    all equipment/monitors on and audible    manual resuscitator at bedside    manual resuscitator/mask/valve in room    suction at bedside    suction regulator  Taken 8/16/2021 0840 by Erika Mccloud, RN  Oral Care: teeth brushed

## 2021-08-16 NOTE — PROGRESS NOTES
Pulmonary Medicine Follow up Note - Ventilator Rounds:    Clinical status discussed today with RN and respiratory therapist.    Chief complaint: Ongoing respiratory failure  Significant change in clinical status during past 24 hours? No  More awake than when I last saw him a few months ago.  Still essentially nonverbal, very weak, looks cachectic  Susy was on the ipad and thinks he has been making slow progress.   Still having a lot of secretions per report.      Ventilation Mode: Trach collar  FiO2 (%): 26 %  Oxygen Concentration (%): 30 %  Resp: 22    Tracheal secretions: overnight: x4, moderate to copious, thick white/yellow/clear, strong cough    Current phase of ventilator weaning pathway:  Phase 2 with PMV days as tolerated, PMV off with cuff inflated at night    Ventilator weaning results from yesterday: TM 0.3/30Lpm, PMV all day  PMV off for night, cuff up.     Current Facility-Administered Medications   Medication     acetaminophen (TYLENOL) solution 650 mg    Or     acetaminophen (TYLENOL) tablet 650 mg     acetylcysteine (MUCOMYST) 20 % nebulizer solution 2 mL     alteplase (CATHFLO ACTIVASE) injection 2 mg     bisacodyl (DULCOLAX) Suppository 10 mg     dextrose 10% infusion     dextrose 5% infusion     dextrose 50 % injection 25-50 mL    Or     glucagon injection 1 mg     docusate (COLACE) 50 MG/5ML liquid 100 mg    Or     docusate sodium (COLACE) capsule 100 mg     famotidine (PEPCID) tablet 20 mg     heparin ANTICOAGULANT injection 5,000 Units     hydrALAZINE (APRESOLINE) injection 10 mg     ipratropium - albuterol 0.5 mg/2.5 mg/3 mL (DUONEB) neb solution 3 mL     lisinopril (ZESTRIL) tablet 40 mg     loperamide (IMODIUM) liquid 1 mg     magnesium hydroxide (MILK OF MAGNESIA) suspension 30 mL     menthol-zinc oxide (CALMOSEPTINE) 0.44-20.6 % ointment OINT     methylphenidate (RITALIN) tablet 10 mg     mirtazapine (REMERON) tablet TABS 7.5 mg     naloxone (NARCAN) injection 0.2 mg    Or     naloxone  "(NARCAN) injection 0.4 mg    Or     naloxone (NARCAN) injection 0.2 mg    Or     naloxone (NARCAN) injection 0.4 mg     ondansetron (ZOFRAN) solution 4 mg     polyethylene glycol (MIRALAX) Packet 17 g     scopolamine (TRANSDERM) 72 hr patch 1 patch    And     scopolamine (TRANSDERM-SCOP) Patch in Place     silver nitrate (ARZOL) Misc     sodium chloride (NEBUSAL) 3 % neb solution 3 mL     sodium chloride 0.9% infusion     venlafaxine (EFFEXOR) tablet 37.5 mg     Physical exam     BP (!) 151/91 (BP Location: Left arm)   Pulse 87   Temp 98.8  F (37.1  C) (Oral)   Resp 22   Ht 1.803 m (5' 10.98\")   Wt 69 kg (152 lb 2 oz)   SpO2 94%   BMI 21.23 kg/m      Gen: awake, tracks intermittently, trach on trach mask  HEENT: no BENJAMIN  Neck: s/p trach   Lungs: diminished ant otherwise clear, no wheezing or rhonchi.   CV: regular, no murmurs or gallops appreciated  Abdomen: soft, NT, BS wnl  Ext: no edema  Neuro: awake, tracking intermittently but only intermittently follows commands, moves spontaneously upper and lower extremities    Sodium   Date Value Ref Range Status   08/16/2021 150 (H) 136 - 145 mmol/L Final   06/11/2021 143 133 - 144 mmol/L Final     Potassium   Date Value Ref Range Status   08/16/2021 4.4 3.5 - 5.0 mmol/L Final   06/11/2021 3.8 3.4 - 5.3 mmol/L Final     Chloride   Date Value Ref Range Status   08/16/2021 113 (H) 98 - 107 mmol/L Final   06/11/2021 106 94 - 109 mmol/L Final     Carbon Dioxide   Date Value Ref Range Status   06/11/2021 36 (H) 20 - 32 mmol/L Final     Carbon Dioxide (CO2)   Date Value Ref Range Status   08/16/2021 28 22 - 31 mmol/L Final     Anion Gap   Date Value Ref Range Status   08/16/2021 9 5 - 18 mmol/L Final   06/11/2021 2 (L) 3 - 14 mmol/L Final     Glucose   Date Value Ref Range Status   08/16/2021 126 (H) 70 - 125 mg/dL Final   06/11/2021 142 (H) 70 - 99 mg/dL Final     Urea Nitrogen   Date Value Ref Range Status   08/16/2021 35 (H) 8 - 22 mg/dL Final   06/11/2021 25 7 - 30 " mg/dL Final     Creatinine   Date Value Ref Range Status   08/16/2021 0.77 0.70 - 1.30 mg/dL Final   06/11/2021 0.57 (L) 0.66 - 1.25 mg/dL Final     GFR Estimate   Date Value Ref Range Status   08/16/2021 >90 >60 mL/min/1.73m2 Final     Comment:     As of July 11, 2021, eGFR is calculated by the CKD-EPI creatinine equation, without race adjustment. eGFR can be influenced by muscle mass, exercise, and diet. The reported eGFR is an estimation only and is only applicable if the renal function is stable.   07/09/2021 >60 >60 mL/min/1.73m2 Final   06/11/2021 >90 >60 mL/min/[1.73_m2] Final     Comment:     Non  GFR Calc  Starting 12/18/2018, serum creatinine based estimated GFR (eGFR) will be   calculated using the Chronic Kidney Disease Epidemiology Collaboration   (CKD-EPI) equation.       Calcium   Date Value Ref Range Status   08/16/2021 10.7 (H) 8.5 - 10.5 mg/dL Final   06/11/2021 9.6 8.5 - 10.1 mg/dL Final       XR CHEST PORT 1 VIEW  LOCATION: [facility]  DATE/TIME: 7/15/2021 7:50 AM  INDICATION: New VAP followup.  COMPARISON: 07/09/2021.  IMPRESSION:   No change in tracheostomy tube. Heart and mediastinal size are stable. There is indistinctness of the pulmonary interstitium, representing either airway inflammation or edema. There is bandlike opacity involving the retrocardiac region of the left lung base, increased from prior study and representing either atelectasis or infectious process. No pleural effusion or pneumothorax. There is some mild elevation of the right hemidiaphragm.    Diagnosis:     Patient is a 71 yo man with history of hypertension. Patient initially presented on 15-May-2021 to [facility] after being found unresponsive by his wife. Patient was then found to have a subarachnoid hemorrhage secondary to a ruptured aneurysm. Patient was intubated and was started on mannitol, IV antihypertensives and hyperventilation and patient was then transferred to [facility]. Patient underwent aneurysm clipping as well as placement of extra-ventricular drain from hydrocephalus on 15-May-2021. Extra-ventricular drain would malfunction and would require replacement on 04-Jun-2021 and 06-Jun-2021. Extra-ventricular drain reportedly was removed on 09-Jun-2021. Patient had intra-arterial vasospasm treatment with verapamil on 28-May-2021.  Patient was extubated on 16-May-2021 but had to be reintubated on 19-May-2021. Patient would be extubated on 03-Jun-2021 but would be reintubated on 06-Jun-2021. Patient had tracheostomy and PEG-tube placed on 07-Jun-2021. Patient required treatment for hospital-acquired pneumonia. Patient was transferred to LTAC on 11-Jun-2021.    1. Acute respiratory failure s/p trach 6/7/2021  2. S/p treatment pseudomonas ESBL and klebsiella pneumonia   3. Encephalopathy   4. Subarachnoid bleed s/p craniotomy and clipping P-comm rupture aneurysm.   5. HTN  6. Malnourishment   7. Dysphagia s/p G tube    Recommendations/Plans ([facility]):  1. Weaning orders: continue phase 2 with PMV days as tolerated, PMV off and cuff inflated at night. Would not progress to phase 3 until cough, strength improve, secretion burden diminishes and protecting his airway more.   2. Trach change? Routine #6 bivona, next changed due 8/28  3. Diagnostic testing? no  4. Continue pulmonary toiletting, scheduled ipratropium-albuterol, 3% saline, acetylcysteine, metaneb  5. Scopolamine patch; would not add additional patch given risk of anticholinergic CNS effects and secretion inspissation  6. DVT prophylaxis heparin SQ    Isael (Bobby) MD Luc[place]  [facility] [employer]/[facility]  Pager [phone]  Clinic [phone]  Fax [phone]

## 2021-08-16 NOTE — PROGRESS NOTES
CLINICAL NUTRITION SERVICES - REASSESSMENT NOTE      Recommendations Ordered by Registered Dietitian (RD):   Administer bowel medications for constipation   Malnutrition:   % Weight Loss:  None noted - weight increased 2 kg since 8/11  % Intake:  No decreased intake noted  Subcutaneous Fat Loss:  Orbital region severe depletion and Upper arm region severe depletion  Muscle Loss:  Temporal region severe depletion, Clavicle bone region severe depletion, Acromion bone region severe depletion, Anterior thigh region severe depletion and Posterior calf region severe depletion  Fluid Retention:  None noted     Malnutrition Diagnosis: Severe malnutrition  In Context of:  Acute illness or injury     EVALUATION OF PROGRESS TOWARD GOALS   Diet:  None    Nutrition Support:    Adult Formula Lily QRuso Peptide 1.5    Route Gastrostomy    Goal Rate 90    Goal Units mL/hr      Intake/Tolerance:  At goal rate, tolerating, no BM since 8/10    ASSESSED NUTRITION NEEDS:  Dosing Weight:  67 kg    NEW FINDINGS:   - D5 at 100 m/hr providing 2400 mL fluids, 120g dextrose, 408 kcal/day. Nephrology managing and will notify when IVF decrease.   - No TF flushes ordered, added minimal TF flushes 30 mL Q4 hours for tube patency  - last BM 8/10 (loose)  - Hypercalcemia: Calcium trending down  - pt. Receiving 3240 kcal from TF + 408 kcal from D5 = 3648 kcal/day    Labs:  Electrolytes  Potassium (mmol/L)   Date Value   08/16/2021 4.4   08/13/2021 4.5   08/11/2021 4.4   06/11/2021 3.8   06/10/2021 3.7   06/09/2021 3.8     Phosphorus (mg/dL)   Date Value   08/13/2021 4.2   08/11/2021 4.3   06/11/2021 2.9   06/10/2021 2.5   06/09/2021 2.5   06/08/2021 2.3 (L)   06/07/2021 2.9    Blood Glucose  Glucose (mg/dL)   Date Value   08/16/2021 126 (H)   08/13/2021 110   08/11/2021 115   08/09/2021 118   08/06/2021 111   06/11/2021 142 (H)   06/10/2021 134 (H)   06/09/2021 117 (H)   06/08/2021 125 (H)   06/07/2021 103 (H)     GLUCOSE BY METER POCT (mg/dL)    Date Value   08/10/2021 108     Hemoglobin A1C (%)   Date Value   05/15/2021 5.5    Inflammatory Markers  CRP Inflammation (mg/L)   Date Value   06/01/2021 79.0 (H)   05/30/2021 120.0 (H)   05/28/2021 150.0 (H)     WBC (10e9/L)   Date Value   06/11/2021 8.2   06/10/2021 9.4   06/09/2021 10.4     WBC Count (10e3/uL)   Date Value   08/16/2021 6.7   08/09/2021 8.0   08/04/2021 9.3     Albumin (g/dL)   Date Value   08/13/2021 2.9 (L)   08/11/2021 2.9 (L)   06/14/2021 2.1 (L)   06/02/2021 1.7 (L)   05/26/2021 2.1 (L)   05/23/2021 1.9 (L)      Magnesium (mg/dL)   Date Value   08/16/2021 2.0   06/11/2021 2.5 (H)   06/10/2021 2.4 (H)   06/09/2021 2.4 (H)     Sodium (mmol/L)   Date Value   08/16/2021 150 (H)   08/13/2021 141   08/11/2021 143   06/11/2021 143   06/10/2021 142   06/09/2021 143    Renal  Urea Nitrogen (mg/dL)   Date Value   08/16/2021 35 (H)   08/13/2021 46 (H)   08/11/2021 43 (H)   06/11/2021 25   06/10/2021 23   06/09/2021 24     Creatinine (mg/dL)   Date Value   08/16/2021 0.77   08/13/2021 0.87   08/11/2021 0.88   06/11/2021 0.57 (L)   06/10/2021 0.52 (L)   06/09/2021 0.49 (L)     Additional  Triglycerides (mg/dL)   Date Value   05/28/2021 91   06/05/2013 178 (H)   09/19/2007 109     Ketones Urine   Date Value   07/01/2021 Negative   06/01/2021 Negative mg/dL            Previous Goals:   Tolerate new TF formula  Evaluation: Met     Maintain current weight 67kg +/- 1-2 kg  Evaluation: Met     Previous Nutrition Diagnosis:   Predicted excessive nutrient intake related to 2800 mg Ca/day from TF as evidenced by elevated blood Ca level 11.2 mg/dL  Evaluation: Improving - Ca 10.7 mg/dL    CURRENT NUTRITION DIAGNOSIS  Inadequate oral intake related to dysphagia as evidenced by reliance on tube feeding to meet nutritional needs.    INTERVENTIONS  Recommendations / Nutrition Prescription  Free water flush 30 Q4 hours for feeding tube patency  Decrease TF rate to 80 mL/hr while D5 running at 100 mL/hr  Recommend TF  as follows:   Type of Feeding Tube: Gastrostomy  Enteral Frequency:  Continuous  Enteral Regimen: Lily Farms Peptide 1.5 at 80 mL/hr  Total Enteral Provisions: 1920 mL daily provides 2880 kcal, 142g protein, 267g CHO, 17g fiber and 1344mL free water. Meets 100% of DRI's.  Free Water Flush: 30 mL q4 hours, increase to 200 mL once IVF off  TF + fluid flush + IVF = 3924 mL per day    Daily electrolyte check, Mg and Phos add on  Daily weights  Start at new goal rate 80 mL/hr    Implementation  Feeding Tube Flush 30 mL Q 4hrs    Goals  BM  Maintain weight 67 kg +/- 1-2 kg    MONITORING AND EVALUATION:  Progress towards goals will be monitored and evaluated per protocol and Practice Guidelines    Dulce Gonzales RDN, LD  Clinical Dietitian

## 2021-08-16 NOTE — PROGRESS NOTES
RT PROGRESS NOTE    DATA  CURRENT SETTINGS --  Ventilation Mode: Trach collar  FiO2 (%): 30 %  Oxygen Concentration (%): 30 %  Resp: 20     TRACH TYPE -- Bivona #6 Cuffed 7/28    ACTION  THERAPIES -- BID Mucomyst, Duoneb and NaCl 3%   SUCTION   FREQUENCY -- 4   AMOUNT -- moderate to copious    CONSISTENCY -- thick    COLOR -- white/yellow/clear    SPONTANEOUS COUGH -- strong   WEAN PHASE #2   MODE -- HTD 20 LPM 30%   DURATION -- 24/7   END REASON -- N/A    RESPONSE  BREATH SOUNDS -- coarse  VITAL SIGNS --  Temp:  [98.2  F (36.8  C)-98.8  F (37.1  C)] 98.2  F (36.8  C)  Pulse:  [86-94] 94  Resp:  [18-24] 20  BP: (128-139)/(74-85) 139/82  FiO2 (%):  [30 %] 30 %  SpO2:  [91 %-97 %] 94 %   EMOTIONAL CONCERNS -- None   RISK FOR SELF-DECANNULATION -- No     NOTE   No signs of respiratory distress. RT will continue to monitor.     Kayla Small, RT on 8/16/2021 at 5:18 AM

## 2021-08-16 NOTE — PLAN OF CARE
Problem: Adult Inpatient Plan of Care  Goal: Optimal Comfort and Wellbeing  Outcome: Improving     Problem: Restraint for Non-Violent/Non-Self-Destructive Behavior  Goal: Prevent/Manage Potential Problems  Description: Maintain safety of patient and others during period of restraint.  Promote psychological and physical wellbeing.  Prevent injury to skin and involved body parts.  Promote nutrition, hydration, and elimination.  Outcome: Improving     Since assuming care at 1900, pt has been calm/resting while watching television.  No agitation or aggression observed. Pt has allowed all cares to be performed.    Continued oral/trach suctioning. Thick yellow secretions removed from mouth. Thick white secretions removed from trach. Repositioned every two hours.

## 2021-08-16 NOTE — PROGRESS NOTES
[facility]    Medicine Progress Note - Hospitalist Service       Date of Admission:  6/11/2021    Summary:    Dayron Neri is a 70 year male with h/o HTN who presented to ED on 5/15/2021 with acute SAH after a fall with unresponsiveness.  He underwent emergent left external ventricular drain placement.  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  On 5/28/2021, patient had persistent cerebral vasospasm, CT head showed bilateral BAYLEE infarct and was treated with milrinone and verapamil.  On 6/1/2021, angiogram showed no evidence of vasospasm.  Extubated on 6/3/2021.  6/6/2021 he was reintubated due to hypoxia.  On 6/7/2021, patient underwent tracheostomy and PEG tube placement.  EVD removed 6/9/2021.   Patient was treated for possible pneumonia with Unasyn on 5/20/2021, It was switched to ceftriaxone the next day for sputum culture growing Klebsiella. On 5/23, sputum culture also grew Pseudomonas. Antibiotic was switched to Zosyn.  Due to increased WBCs in CSF, possibility of ventriculitis was raised. CSF culture was negative. Antibiotic was switched to cefepime and vancomycin on 5/28/2021 for 2 weeks.  Vanco was discontinued on 6/9/2021.  On 6/5/2021, sputum grew ESBL so cefepime was switched to meropenem. He was transferred to [facility] on 6/11/21.     Repeat sputum culture 6/19 showed MDR pseudomonas and he was started on tobramycin nebs from 6/24/2021 - 7/9/2021 .  7/8/2021 aspirated tube feeds.  Spiked a fever that evening 102.8 and WBC went up to 36.9 the next morning so ID reconsulted and was started on ceftazadime-avibactam along with flagyl 7/9/2021 - 7/18/21. He has had repeated bouts of Klebsiella infection in sputum and ventilator associated pneumonia.  Additionally he has had a large amount of tracheal secretions, significant cognitive impairment, left-sided neglect and ongoing episodes of agitation.  Patient's  family has been monitoring the patient continuously during the daytime hours via video monitor and visit with him in person over the weekends.    8/16 :     Clinically stable  Respiratory status stable - on 30%,  30 liters  On Phase 2 wean~continous heated trach mask with hi rosemarie 30% Fi02 tolerated PMV days.    On Duo-neb, four times a day sodium chloride neb two times a day for  pulmonary hygiene.    Still having significant secretions, Frequent suctioning 5-12x/shift.      Neuro status stable  Needing b/l mittens    Ionized calcium very mildly elevated at 1.4  PTH normal, vitamin D level mildly low  No new issues noted  Discussed with dietary team  Tube feeds have already been switched from Vital 1.5 to Katefarms - which has lower calcium content of about 800 mg  Discussed with nephrology  Trial of NS fluid flushes in FT were not helpful for calcium so switching to D5W with lasix IV         Barriers to discharge : still needing high flow oxygen, frequent suctioning and needing mittens, hypercalcemia        Assessment & Plan               Acute on chronic hypoxemic respiratory failure: S/p treatment pseudomonas ESBL and klebsiella pneumonia. Weaning has been slow and the biggest barrier for decannulation is excessive secretions  Glycopyrrolate was tried and stopped.  Also diuresed him with Lasix for 3 doses with some improvement however that improvement seems to have stabilized or plateaued.  Speaking valve is being attempted in short trials but does aspirate with blue dye test so needs to be only with speech/RT present.  Currently on phase 2 weaning with TM/PMV during day and is off the vent at night.   - Continue to wean per RT and pulmonary.   - Continue  metaneb, acetylcysteine, and 3% saline nebs   - Continue Scopolamine (started 7/20/2021)     Ventilator associated pneumonia: Completed antibiotics on July 18.    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 sputum culture 6/19 showed MDR pseudomonas. On Tobramycin nebs 6/24/2021 - 7/9/2021.   - Continue pulmonary toilet, scheduled duonebs    Traumatic brain injury/intracranial hemorrhage: 6/14/21 CT head done for fatigue and read as slight increase in caliber of lateral and third ventricle with possibility of developing communicating hydrocephalus. [facility] who did not think there was much change. CT head repeated on 6/16/21, which did not show any change.  - Need follow up with Dr. Martinez with Neurosurgery in 3 months with repeat CTA of head and neck for post-op f/u.    Dysphagia: Patient failed the blue dye test 7/20/21 with immediate aspiration, continue to use tube feeds.  Did pass the blue dye test a week later but still high risk for aspiration.  Speech therapy following.  Still having significant secretions, keep NPO due to concerns with aspiration.     Acute metabolic encephalopathy: improved.   - Continue Ritalin and effexor    Hypercalcemia : Calcium has been high normal since early July 1 noted 7/5/2021 @ 10.6, ionized calcium 1.4, Progressively worsening to 11s, with ionized calcium of 1.4-1.45  PTH 26 on 8/11/2021, vitamin D level low at 25, Has been on Ritalin since mid July calcium was already mildly elevated before that, no other supplements noted. Possibly secondary to immobilization prolonged hospitalization, PTH low normal and not suppressed in the setting of hypercalcemia. No history suggestive of any lymphoma or lymphadenopathy or any granulomatosis related disorders to suggest any additional etiology.  -- 1,25 vit D and urine calcium to creat ordered   --Tube feeds have already been switched from Vital 1.5 to Katefarms - which has lower calcium content of about 800 mg  --  Tiraled on  every 2 hours enterally on 8/14/2021 but hasn't really helped much so trial D5W with lasix IV.       Anemia: hemoglobin stable. Continue to monitor.      Essential hypertension: BP controlled. On lisinopril    Severe debility, weakness, critical illness, deconditioning, Continue PT/OT    Insomnia: is on Ritalin which was decreased to once daily due to insomnia when taken later in day.    - Continue nighttime mirtazapine 7.5 mg scheduled.       Diet: Adult Formula Drip Feeding: Continuous Lily Farms Peptide 1.5; Gastrostomy; Goal Rate: 80; mL/hr; Medication - Feeding Tube Flush Frequency: At least 15-30 mL water before and after medication administration and with tube clogging; Amount to Send ...    DVT Prophylaxis: Heparin SQ  Gutierrez Catheter: Not present  Central Lines: None  Code Status: Full Code      Disposition Plan   Expected discharge: to be determined    The patient's care was discussed with the Bedside Nurse and Care Coordinator/.    Tomas Cormier MD  Hospitalist [facility]  Securely message with the Vocera Web Console (learn more here)  Text page via [employer] Paging/Directory      ______________________________________________________________________      Data reviewed today: I reviewed all medications, new labs and imaging results over the last 24 hours.     Physical Exam   Vital Signs: Temp: 98.8  F (37.1  C) Temp src: Axillary BP: (!) 151/89 Pulse: 88   Resp: 20 SpO2: 96 % O2 Device: Trach dome Oxygen Delivery: 20 LPM  Weight: 152 lbs 2 oz    General appearance: not in acute distress  HEENT: PERRL, EOMI  Lungs: Clear breath sounds in bilateral lung fields  Cardiovascular: Regular rate and rhythm, normal S1-S2  Abdomen: Soft, non tender, no distension  Musculoskeletal: No joint swelling  Skin: No rash and no edema  Neurology: Alert, Cranial nerves II - XII normal. Moves right arm spont. Able to lift bilateral legs off the floor.    Data   Recent Labs   Lab 08/16/21  0649 08/15/21  0716 08/14/21  0704 08/13/21  0604 08/11/21  0610   WBC 6.7  --   --   --   --    HGB 9.6*  --   --   --   --    MCV 97  --   --   --   --    PLT  377  --   --   --   --    *  --   --  141 143   POTASSIUM 4.4  --   --  4.5 4.4   CHLORIDE 113*  --   --  103 103   CO2 28  --   --  28 32*   BUN 35*  --   --  46* 43*   CR 0.77  --   --  0.87 0.88   ANIONGAP 9  --   --  10 8   RAGINI 10.7* 10.7* 11.2* 11.0* 11.2*   *  --   --  110 115   ALBUMIN  --   --   --  2.9* 2.9*       Restraint:    Patient pulls his IV line and trach.     Within an hour after restraint an in person face to face assessment was completed at 9:30 AM, including an evaluation of the patient's immediate reaction to the intervention, behavioral assessment and review/assessment of history, drugs and medications, recent labs, etc., and behavioral condition.  The patient experienced: No adverse physical outcome from seclusion/restraint initiation.  The intervention of restraint or seclusion needs to continue.

## 2021-08-16 NOTE — PROGRESS NOTES
RENAL PROGRESS NOTE    CC:  Hypercalcemia     ASSESSMENT & PLAN:   70 year old male with past medical history of hypertension, admitted with subarachnoid hemorrhage after a fall on 5/15/2021 s/p emergent left external ventricular drain placement with posterior communicating artery aneurysm defied structured underwent craniotomy with clipping of aneurysm , complicated by persistent cerebral vasospasms and bilateral BAYLEE infarct.  Course also complicated by respiratory failure with intubation pneumonia was treated with cefepime and Vanco, transferred to [facility] on 6/11/2021.  Nephrology consulted for hypercalcemia on 8/15/2021     Hypercalcemia  Preserved renal functions  Calcium has been high normal since early July 1 noted 7/5/2021 @ 10.6, ionized calcium 1.4  Progressively worsening to 11s, with ionized calcium of 1.4-1.45  PTH 26 on 8/11/2021, vitamin D level 30 8/15/2021  Has been on Ritalin since mid July, calcium was already mildly elevated before that, no other supplements noted  Likely secondary to immobilization/ prolonged hospitalization, PTH low normal and not suppressed in the setting of hypercalcemia  No history suggestive of any lymphoma or lymphadenopathy or any granulomatosis related disorders to suggest any additional etiology.  --Tube feeds were adjusted yesterday  -- Started on  ml every 2 hours enterally on 8/14/2021, now serum sodium 150 this AM, will stop NS flushes in tube feeds, resume free water flushes at minimum 30 ml q4h, start D5W at 100 ml/hr and dose IV Lasix 20 mg. Goal sodium correction 6-8 mmol/L in 24 hours   Ca stable at 10.7 today   -- Check electrolyte panel tomorrow       Renal function preserved  Creatinine based GFR of 89-90 creatinine 0.8  Unclear with prolonged illness, sarcopenia, if it is overestimated  --Continue to monitor.   --24 cr clearance pending     Hypertension lisinopril 40 mg  euvolemic to mild volume depletion  Likely elevated insensible losses with vent  and tachypnea  --Increased enteral replacement   --Blood pressure well controlled     Anemia  Inflammatory  Mild management per hospitalist medicine     Acute on chronic hypoxemic respiratory failure  S/p trach  ESBL Pseudomonas and Klebsiella pneumonia  Had tobramycin nebs for MDR Pseudomonas  Pulmonary following  Currently off antibiotics     Malnutrition on tube feed     SAH w fall sp craniotomy and hematoma evacuation , PCA aneurysm clip  cb BAYLEE stroke ad vasopasm  No on rehab     Insomnia/mood disorders  On Ritalin and mirtazapine  Management per primary medicine     Metabolic encephalopathy stable    SUBJECTIVE:   Started on NS flushes 8/14  Ca stable, sodium rising   Discussed with family on ipad  Discussed with RN and Dr Cormier   Changing tube feed flushes back to free water   Starting D5W at 100cc/hr   Dose IV Lasix 20 mg     OBJECTIVE:  Physical Exam   Temp: 97.9  F (36.6  C) Temp src: Axillary BP: (!) 151/89 Pulse: 96   Resp: 24 SpO2: 95 % O2 Device: Trach dome Oxygen Delivery: 20 LPM  Vitals:    08/13/21 0528 08/15/21 0624 08/16/21 0627   Weight: 68.2 kg (150 lb 5 oz) 68.5 kg (151 lb 0.6 oz) 69 kg (152 lb 2 oz)     Vital Signs with Ranges  Temp:  [97.9  F (36.6  C)-98.7  F (37.1  C)] 97.9  F (36.6  C)  Pulse:  [] 96  Resp:  [18-24] 24  BP: (128-167)/(82-89) 151/89  FiO2 (%):  [26 %-30 %] 26 %  SpO2:  [91 %-96 %] 95 %  I/O last 3 completed shifts:  In: 4214 [NG/GT:4214]  Out: 2050 [Urine:2050]      Patient Vitals for the past 72 hrs:   Weight   08/16/21 0627 69 kg (152 lb 2 oz)   08/15/21 0624 68.5 kg (151 lb 0.6 oz)       Intake/Output Summary (Last 24 hours) at 8/16/2021 1118  Last data filed at 8/16/2021 1000  Gross per 24 hour   Intake 4214 ml   Output 1150 ml   Net 3064 ml       PHYSICAL EXAM:  General: Awake, NAD, OT working with patient   HENT: Supple, Non-tender, no obvious JVD  Eyes: No scleral icterus  Cardiovascular: RRR, no rub, gallop, or murmur. No-trace LE edema.  Respiratory:  Non-labored, trach +  Gastrointestinal: Soft, non-tender, non-distended  Integumentary: Warm, dry, no rash  Neurologic: Grossly intact     LABORATORY STUDIES:     Recent Labs   Lab 08/16/21  0649   WBC 6.7   RBC 3.03*   HGB 9.6*   HCT 29.5*          Basic Metabolic Panel:  Recent Labs   Lab 08/16/21  0649 08/15/21  0716 08/14/21  0704 08/13/21  0604 08/11/21  0610 08/10/21  2030   *  --   --  141 143  --    POTASSIUM 4.4  --   --  4.5 4.4  --    CHLORIDE 113*  --   --  103 103  --    CO2 28  --   --  28 32*  --    BUN 35*  --   --  46* 43*  --    CR 0.77  --   --  0.87 0.88  --    *  --   --  110 115 108   RAGINI 10.7* 10.7* 11.2* 11.0* 11.2*  --        INRNo lab results found in last 7 days.     Recent Labs   Lab Test 08/16/21  0649 08/09/21  0615 05/16/21  0405 05/15/21  2125 05/15/21  1222   INR  --   --   --  1.20* 1.16*   WBC 6.7 8.0   < > 12.3* 16.5*   HGB 9.6* 10.7*   < > 11.8* 13.6    289   < > 239 257    < > = values in this interval not displayed.       Personally reviewed current labs    Elpidio Rowley PA-C  [facility]  [phone]

## 2021-08-17 ENCOUNTER — APPOINTMENT (OUTPATIENT)
Dept: SPEECH THERAPY | Facility: CLINIC | Age: 71
DRG: 207 | End: 2021-08-17
Attending: HOSPITALIST
Payer: COMMERCIAL

## 2021-08-17 ENCOUNTER — APPOINTMENT (OUTPATIENT)
Dept: PHYSICAL THERAPY | Facility: CLINIC | Age: 71
DRG: 207 | End: 2021-08-17
Attending: HOSPITALIST
Payer: COMMERCIAL

## 2021-08-17 LAB
ALBUMIN MFR UR ELPH: 7.3 MG/DL
ANION GAP SERPL CALCULATED.3IONS-SCNC: 10 MMOL/L (ref 5–18)
BUN SERPL-MCNC: 32 MG/DL (ref 8–22)
CALCIUM SERPL-MCNC: 10.6 MG/DL (ref 8.5–10.5)
CHLORIDE BLD-SCNC: 106 MMOL/L (ref 98–107)
CO2 SERPL-SCNC: 29 MMOL/L (ref 22–31)
COLLECT DURATION TIME UR: 24 H
CREAT 24H UR-MRATE: 0.5 G/SPEC (ref 1–2)
CREAT SERPL-MCNC: 0.77 MG/DL (ref 0.7–1.3)
CREAT UR-MCNC: 31 MG/DL
GFR SERPL CREATININE-BSD FRML MDRD: >90 ML/MIN/1.73M2
GLUCOSE BLD-MCNC: 110 MG/DL (ref 70–125)
POTASSIUM BLD-SCNC: 4.1 MMOL/L (ref 3.5–5)
PROT 24H UR-MRATE: 116.8 MG/SPECIMEN (ref 0–150)
PROT/CREAT 24H UR: 0.24 MG/MG CR
SODIUM SERPL-SCNC: 145 MMOL/L (ref 136–145)
SPECIMEN VOL UR: 1600 ML

## 2021-08-17 PROCEDURE — 250N000013 HC RX MED GY IP 250 OP 250 PS 637: Performed by: PHYSICIAN ASSISTANT

## 2021-08-17 PROCEDURE — 94640 AIRWAY INHALATION TREATMENT: CPT

## 2021-08-17 PROCEDURE — 250N000013 HC RX MED GY IP 250 OP 250 PS 637: Performed by: HOSPITALIST

## 2021-08-17 PROCEDURE — 999N000009 HC STATISTIC AIRWAY CARE

## 2021-08-17 PROCEDURE — 250N000011 HC RX IP 250 OP 636

## 2021-08-17 PROCEDURE — 999N000123 HC STATISTIC OXYGEN O2DAILY TECH TIME

## 2021-08-17 PROCEDURE — 80048 BASIC METABOLIC PNL TOTAL CA: CPT | Performed by: PHYSICIAN ASSISTANT

## 2021-08-17 PROCEDURE — 94668 MNPJ CHEST WALL SBSQ: CPT

## 2021-08-17 PROCEDURE — 84156 ASSAY OF PROTEIN URINE: CPT | Performed by: INTERNAL MEDICINE

## 2021-08-17 PROCEDURE — 120N000017 HC R&B RESPIRATORY CARE

## 2021-08-17 PROCEDURE — 250N000009 HC RX 250: Performed by: INTERNAL MEDICINE

## 2021-08-17 PROCEDURE — 92507 TX SP LANG VOICE COMM INDIV: CPT | Mod: GN

## 2021-08-17 PROCEDURE — 94640 AIRWAY INHALATION TREATMENT: CPT | Mod: 76

## 2021-08-17 PROCEDURE — 99233 SBSQ HOSP IP/OBS HIGH 50: CPT | Performed by: PHYSICIAN ASSISTANT

## 2021-08-17 PROCEDURE — 36415 COLL VENOUS BLD VENIPUNCTURE: CPT | Performed by: PHYSICIAN ASSISTANT

## 2021-08-17 PROCEDURE — 92526 ORAL FUNCTION THERAPY: CPT | Mod: GN

## 2021-08-17 PROCEDURE — 99233 SBSQ HOSP IP/OBS HIGH 50: CPT | Performed by: INTERNAL MEDICINE

## 2021-08-17 PROCEDURE — 999N000157 HC STATISTIC RCP TIME EA 10 MIN

## 2021-08-17 PROCEDURE — 97112 NEUROMUSCULAR REEDUCATION: CPT | Mod: GP | Performed by: PHYSICAL THERAPIST

## 2021-08-17 PROCEDURE — 250N000013 HC RX MED GY IP 250 OP 250 PS 637: Performed by: INTERNAL MEDICINE

## 2021-08-17 PROCEDURE — 250N000013 HC RX MED GY IP 250 OP 250 PS 637

## 2021-08-17 PROCEDURE — 97530 THERAPEUTIC ACTIVITIES: CPT | Mod: GP | Performed by: PHYSICAL THERAPIST

## 2021-08-17 PROCEDURE — 94660 CPAP INITIATION&MGMT: CPT

## 2021-08-17 RX ORDER — FUROSEMIDE 20 MG
20 TABLET ORAL
Status: DISCONTINUED | OUTPATIENT
Start: 2021-08-17 | End: 2021-08-20

## 2021-08-17 RX ADMIN — VENLAFAXINE 37.5 MG: 37.5 TABLET ORAL at 08:19

## 2021-08-17 RX ADMIN — LISINOPRIL 40 MG: 20 TABLET ORAL at 18:16

## 2021-08-17 RX ADMIN — ANORECTAL OINTMENT: 15.7; .44; 24; 20.6 OINTMENT TOPICAL at 08:20

## 2021-08-17 RX ADMIN — ANORECTAL OINTMENT: 15.7; .44; 24; 20.6 OINTMENT TOPICAL at 21:26

## 2021-08-17 RX ADMIN — HEPARIN SODIUM 5000 UNITS: 5000 INJECTION, SOLUTION INTRAVENOUS; SUBCUTANEOUS at 14:16

## 2021-08-17 RX ADMIN — SODIUM CHLORIDE 30 MG/ML INHALATION SOLUTION 3 ML: 30 SOLUTION INHALANT at 19:51

## 2021-08-17 RX ADMIN — FAMOTIDINE 20 MG: 20 TABLET, FILM COATED ORAL at 08:18

## 2021-08-17 RX ADMIN — FUROSEMIDE 20 MG: 20 TABLET ORAL at 18:14

## 2021-08-17 RX ADMIN — MIRTAZAPINE 7.5 MG: 7.5 TABLET ORAL at 21:26

## 2021-08-17 RX ADMIN — VENLAFAXINE 37.5 MG: 37.5 TABLET ORAL at 18:14

## 2021-08-17 RX ADMIN — VENLAFAXINE 37.5 MG: 37.5 TABLET ORAL at 12:41

## 2021-08-17 RX ADMIN — METHYLPHENIDATE HYDROCHLORIDE 10 MG: 10 TABLET ORAL at 06:02

## 2021-08-17 RX ADMIN — IPRATROPIUM BROMIDE AND ALBUTEROL SULFATE 3 ML: 2.5; .5 SOLUTION RESPIRATORY (INHALATION) at 19:38

## 2021-08-17 RX ADMIN — FUROSEMIDE 20 MG: 20 TABLET ORAL at 12:41

## 2021-08-17 RX ADMIN — IPRATROPIUM BROMIDE AND ALBUTEROL SULFATE 3 ML: 2.5; .5 SOLUTION RESPIRATORY (INHALATION) at 09:12

## 2021-08-17 RX ADMIN — ACETYLCYSTEINE 2 ML: 200 SOLUTION ORAL; RESPIRATORY (INHALATION) at 19:37

## 2021-08-17 RX ADMIN — BISACODYL 10 MG: 10 SUPPOSITORY RECTAL at 05:54

## 2021-08-17 RX ADMIN — FAMOTIDINE 20 MG: 20 TABLET, FILM COATED ORAL at 21:25

## 2021-08-17 RX ADMIN — ACETYLCYSTEINE 2 ML: 200 SOLUTION ORAL; RESPIRATORY (INHALATION) at 09:12

## 2021-08-17 RX ADMIN — SODIUM CHLORIDE 30 MG/ML INHALATION SOLUTION 3 ML: 30 SOLUTION INHALANT at 10:30

## 2021-08-17 RX ADMIN — HEPARIN SODIUM 5000 UNITS: 5000 INJECTION, SOLUTION INTRAVENOUS; SUBCUTANEOUS at 21:25

## 2021-08-17 RX ADMIN — ANORECTAL OINTMENT: 15.7; .44; 24; 20.6 OINTMENT TOPICAL at 14:16

## 2021-08-17 RX ADMIN — HEPARIN SODIUM 5000 UNITS: 5000 INJECTION, SOLUTION INTRAVENOUS; SUBCUTANEOUS at 06:04

## 2021-08-17 ASSESSMENT — MIFFLIN-ST. JEOR: SCORE: 1478.16

## 2021-08-17 NOTE — PROGRESS NOTES
Palliative Care Chart check      Acute on chronic hypoxemic respiratory failure: S/p treatment pseudomonas ESBL and klebsiella pneumonia. Weaning has been slow and the biggest barrier for decannulation is excessive secretions  Glycopyrrolate was tried and stopped.  Also diuresed him with Lasix for 3 doses with some improvement however that improvement seems to have stabilized or plateaued.  Speaking valve is being attempted in short trials but does aspirate with blue dye test so needs to be only with speech/RT present.  Currently on phase 2 weaning with TM/PMV during day and is off the vent at night.   - Continue to wean per RT and pulmonary.   - Continue  metaneb, acetylcysteine, and 3% saline nebs   - Continue Scopolamine (started 7/20/2021)     Still essentially nonverbal, very weak, looks cachectic  Susy was on the ipad and thinks he has been making slow progress.   Still having a lot of secretions per report.   He is awake, tracking intermittently but only intermittently follows commands, moves spontaneously upper and lower     Palliative care will continue to follow    SISI Romero, NP-C, [facility]

## 2021-08-17 NOTE — PLAN OF CARE
Problem: Device-Related Complication Risk (Artificial Airway)  Goal: Optimal Device Function  Outcome: Improving  Intervention: Optimize Device Care and Function  Recent Flowsheet Documentation  Taken 8/16/2021 1917 by Timoteo Tran RT  Airway Safety Measures:    all equipment/monitors on and audible    manual resuscitator at bedside    suction at bedside    suction equipment    oxygen flowmeter     Problem: Skin and Tissue Injury (Artificial Airway)  Goal: Absence of Device-Related Skin or Tissue Injury  Outcome: Improving     Problem: Communication Impairment (Artificial Airway)  Goal: Effective Communication  Outcome: Improving  RT PROGRESS NOTE     DATA:     CURRENT SETTINGS:             TRACH TYPE / SIZE: #6 Bivona (Cuff up, placed 07/28)             MODE:  TM             FIO2:  20 L , 26%     ACTION:             THERAPIES: Duo-neb/Mucomyst/Saline/MetaNeb BID             SUCTION:  Yes                         FREQUENCY: 5x                        AMOUNT:   Moderate x3 to Large x1 to Copious x1                        CONSISTENCY: Thick                        COLOR:   White             SPONTANEOUS COUGH EFFORT/STRENGTH OF EFFORT (not elicited by suctioning): Strong productive cough.                              WEANING PHASE:   2                        WEAN MODE:  TM with PMV                        WEAN TIME:  14 hours and 54 minutes                        END WEAN REASON: Cuff up at noc per order.     RESPONSE:             BS:  Coarse             VITAL SIGNS: Sat 94-97%, HR 81-94, RR 20-23             EMOTIONAL NEEDS / CONCERNS:  None                RISK FOR SELF DECANNULATION:  Yes                        RISK DUE TO:  Confusion                        INTERVENTION/S IN PLACE IS/ARE: Mitts x2       NOTE / PLAN:     Pt remains on TM and tolerating well with no respiratory distress.  Continue current care plan.

## 2021-08-17 NOTE — PLAN OF CARE
"  Problem: Device-Related Complication Risk (Artificial Airway)  Goal: Optimal Device Function  Outcome: No Change     Problem: Skin and Tissue Injury (Artificial Airway)  Goal: Absence of Device-Related Skin or Tissue Injury  Outcome: No Change     Problem: Communication Impairment (Artificial Airway)  Goal: Effective Communication  Outcome: Improving   RT PROGRESS NOTE     DATA:     CURRENT SETTINGS:             TRACH TYPE / SIZE:  #6 bivona changed 7/28             MODE:   20L and 21% with cuff inflated                 ACTION:             THERAPIES:   albuterol bid, mucomyst bid, saline bid, metaneb bid             SUCTION:                           FREQUENCY:   x6                        AMOUNT:   scant x1, copious x1 when cuff deflated to use pmv, and large x4                        CONSISTENCY:   thick                        COLOR:   white to pale yellow             SPONTANEOUS COUGH EFFORT/STRENGTH OF EFFORT (not elicited by suctioning): moderate to strong                              WEANING PHASE:   2                        WEAN MODE:    pmv                        WEAN TIME:   started at 10:50                         RESPONSE:             BS:   diminished and coarse             VITAL SIGNS:   Blood pressure 121/89, pulse 88, temperature 97.8  F (36.6  C), temperature source Oral, resp. rate 26, height 1.803 m (5' 10.98\"), weight 68.6 kg (151 lb 4.8 oz), SpO2 95 %.               EMOTIONAL NEEDS / CONCERNS:  no                RISK FOR SELF DECANNULATION:  yes                        RISK DUE TO:  Confusion/ impulsive                        INTERVENTION/S IN PLACE IS/ARE:  Bilateral mitts       NOTE / PLAN:   Pulmonary toilet.    "

## 2021-08-17 NOTE — PLAN OF CARE
Problem: Adult Inpatient Plan of Care  Goal: Absence of Hospital-Acquired Illness or Injury  Intervention: Prevent Infection  Recent Flowsheet Documentation  Taken 8/16/2021 1700 by Amanda Jean RN  Infection Prevention: hand hygiene promoted   Patient had contact precautions maintained. Hand hygiene  promoted before and after cares. Patient denied pain during the shift.

## 2021-08-17 NOTE — PROGRESS NOTES
[facility]    Medicine Progress Note - Hospitalist Service       Date of Admission:  6/11/2021    Summary:    Dayron Neri is a 70 year male with h/o HTN who presented to ED on 5/15/2021 with acute SAH after a fall with unresponsiveness.  He underwent emergent left external ventricular drain placement.  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  On 5/28/2021, patient had persistent cerebral vasospasm, CT head showed bilateral BAYLEE infarct and was treated with milrinone and verapamil.  On 6/1/2021, angiogram showed no evidence of vasospasm.  Extubated on 6/3/2021.  6/6/2021 he was reintubated due to hypoxia.  On 6/7/2021, patient underwent tracheostomy and PEG tube placement.  EVD removed 6/9/2021.   Patient was treated for possible pneumonia with Unasyn on 5/20/2021, It was switched to ceftriaxone the next day for sputum culture growing Klebsiella. On 5/23, sputum culture also grew Pseudomonas. Antibiotic was switched to Zosyn.  Due to increased WBCs in CSF, possibility of ventriculitis was raised. CSF culture was negative. Antibiotic was switched to cefepime and vancomycin on 5/28/2021 for 2 weeks.  Vanco was discontinued on 6/9/2021.  On 6/5/2021, sputum grew ESBL so cefepime was switched to meropenem. He was transferred to [facility] on 6/11/21.     Repeat sputum culture 6/19 showed MDR pseudomonas and he was started on tobramycin nebs from 6/24/2021 - 7/9/2021 .  7/8/2021 aspirated tube feeds.  Spiked a fever that evening 102.8 and WBC went up to 36.9 the next morning so ID reconsulted and was started on ceftazadime-avibactam along with flagyl 7/9/2021 - 7/18/21. He has had repeated bouts of Klebsiella infection in sputum and ventilator associated pneumonia.  Additionally he has had a large amount of tracheal secretions, significant cognitive impairment, left-sided neglect and ongoing episodes of agitation.  Patient's  family has been monitoring the patient continuously during the daytime hours via video monitor and visit with him in person over the weekends.    8/17 :     Clinically stable  Respiratory status improving - down to FiO2 21-26%, and 20 liters  On Phase 2 wean~continous heated trach mask with hi rosemarie 30% Fi02 tolerated PMV days.    On Duo-neb, four times a day sodium chloride neb two times a day for  pulmonary hygiene.    Still having significant secretions, Frequent suctioning 4-5 x/shift.    Calcium slightly improved after D5W infusion with lasix IV    Barriers to discharge : still needing high flow oxygen, frequent suctioning and needing mittens, hypercalcemia        Assessment & Plan               Acute on chronic hypoxemic respiratory failure: S/p treatment pseudomonas ESBL and klebsiella pneumonia. Weaning has been slow and the biggest barrier for decannulation is excessive secretions  Glycopyrrolate was tried and stopped.  Also diuresed him with Lasix for 3 doses with some improvement however that improvement seems to have stabilized or plateaued.  Speaking valve is being attempted in short trials but does aspirate with blue dye test so needs to be only with speech/RT present.  Currently on phase 2 weaning with TM/PMV during day and has cuff up at night and is off the vent at night.   - Continue to wean per RT and pulmonary.   - Continue  duonebs, acetylcysteine, and 3% saline nebs   - Continue Scopolamine (started 7/20/2021)     Ventilator associated pneumonia: Completed antibiotics on July 18.    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 sputum culture 6/19 showed MDR pseudomonas. completed Tobramycin nebs 6/24/2021 - 7/9/2021.   - Continue pulmonary toilet, scheduled duonebs    Traumatic brain injury/intracranial hemorrhage: 6/14/21 CT head done for fatigue and read as slight increase in caliber of lateral and third ventricle with possibility of  developing communicating hydrocephalus. Discussed with Dr. Casillas (neurosurgeon at [facility]), who did not think there was much change. CT head repeated on 6/16/21, which did not show any change.  - Need follow up with Dr. Martinez with Neurosurgery in 3 months with repeat CTA of head and neck for post-op f/u.    Dysphagia: Patient failed the blue dye test 7/20/21 with immediate aspiration, continue to use tube feeds.  Did pass the blue dye test a week later but still high risk for aspiration.  Speech therapy following.  Still having significant secretions, keep NPO due to concerns with aspiration.     Acute metabolic encephalopathy: improved.   - Continue Ritalin and effexor    Hypercalcemia : Calcium has been high normal since early July 1 noted 7/5/2021 @ 10.6, ionized calcium 1.4, Progressively worsening to 11s, with ionized calcium of 1.4-1.45  PTH 26 on 8/11/2021, vitamin D level low at 25, Has been on Ritalin since mid July calcium was already mildly elevated before that, no other supplements noted. Possibly secondary to immobilization prolonged hospitalization, PTH low normal and not suppressed in the setting of hypercalcemia. No history suggestive of any lymphoma or lymphadenopathy or any granulomatosis related disorders to suggest any additional etiology.  -- 1,25 vit D and urine calcium to creat ordered   --Tube feeds have already been switched from Vital 1.5 to Katefarms - which has lower calcium content of about 800 mg  --  improved some today after D5W with lasix IV.  Nephrology recommending lasix by FT and back to 200 mL flushes every 4 hours in FT.    Malnutrition:    - Level of malnutrition: Severe   - Based on: moderate/severe subcutaneous fat loss, moderate/severe muscle loss     Anemia: hemoglobin stable. Continue to monitor.     Essential hypertension: BP controlled. On lisinopril    Severe debility, weakness, critical illness, deconditioning, Continue PT/OT    Insomnia: is on Ritalin which was  decreased to once daily due to insomnia when taken later in day.    - Continue nighttime mirtazapine 7.5 mg scheduled.       Diet: Adult Formula Drip Feeding: Continuous [employer] Peptide 1.5; Gastrostomy; Goal Rate: 80; mL/hr; Medication - Feeding Tube Flush Frequency: At least 15-30 mL water before and after medication administration and with tube clogging; Amount to Send ...    DVT Prophylaxis: Heparin SQ  Gutierrez Catheter: Not present  Central Lines: None  Code Status: Full Code      Disposition Plan   Expected discharge: to be determined    The patient's care was discussed with the Bedside Nurse and Care Coordinator/.    Tomas Cormier MD  Hospitalist Service  [facility]  Securely message with the Vocera Web Console (learn more here)  Text page via [employer] Paging/Directory      ______________________________________________________________________      Data reviewed today: I reviewed all medications, new labs and imaging results over the last 24 hours.     Physical Exam   Vital Signs: Temp: 98.7  F (37.1  C) Temp src: Axillary BP: (!) 141/86 Pulse: 89   Resp: 26 SpO2: 96 % O2 Device: Trach dome Oxygen Delivery: 20 LPM  Weight: 151 lbs 4.8 oz    General appearance: not in acute distress  HEENT: PERRL, EOMI  Lungs: Occas rhonchi otherwise Clear breath sounds in bilateral lung fields  Cardiovascular: Regular rate and rhythm, normal S1-S2  Abdomen: Soft, non tender, no distension  Musculoskeletal: No joint swelling  Skin: No rash and no edema  Neurology: Alert, Cranial nerves II - XII normal. Moves right arm spont. Able to lift bilateral legs off the floor.    Data   Recent Labs   Lab 08/17/21  0626 08/16/21  0649 08/15/21  0716 08/13/21  0604 08/11/21  0610   WBC  --  6.7  --   --   --    HGB  --  9.6*  --   --   --    MCV  --  97  --   --   --    PLT  --  377  --   --   --     150*  --  141 143   POTASSIUM 4.1 4.4  --  4.5 4.4   CHLORIDE 106 113*  --  103 103   CO2 29 28  --  28 32*    BUN 32* 35*  --  46* 43*   CR 0.77 0.77  --  0.87 0.88   ANIONGAP 10 9  --  10 8   RAGINI 10.6* 10.7* 10.7* 11.0* 11.2*    126*  --  110 115   ALBUMIN  --   --   --  2.9* 2.9*       Restraint:    Patient pulls his IV line and trach.     Within an hour after restraint an in person face to face assessment was completed at 9:30 AM, including an evaluation of the patient's immediate reaction to the intervention, behavioral assessment and review/assessment of history, drugs and medications, recent labs, etc., and behavioral condition.  The patient experienced: No adverse physical outcome from seclusion/restraint initiation.  The intervention of restraint or seclusion needs to continue.

## 2021-08-17 NOTE — PROGRESS NOTES
Patient was up and down,had small stool twice. D5 iv solution stopped per order. 24 hr urine collection sent. Will continue to monitor.

## 2021-08-17 NOTE — PROGRESS NOTES
RENAL PROGRESS NOTE    CC:  Hypercalcemia     ASSESSMENT & PLAN:   70 year old male with past medical history of hypertension, admitted with subarachnoid hemorrhage after a fall on 5/15/2021 s/p emergent left external ventricular drain placement with posterior communicating artery aneurysm defied structured underwent craniotomy with clipping of aneurysm , complicated by persistent cerebral vasospasms and bilateral BAYLEE infarct.  Course also complicated by respiratory failure with intubation pneumonia was treated with cefepime and Vanco, transferred to LTAC on 6/11/2021.  Nephrology consulted for hypercalcemia on 8/15/2021     Hypercalcemia  Preserved renal functions  Calcium has been high normal since early July 1 noted 7/5/2021 @ 10.6, ionized calcium 1.4  Was progressively worsening to 11s, with ionized calcium of 1.4-1.45  PTH 26 on 8/11/2021, vitamin D level 30 8/15/2021  Has been on Ritalin since mid July, calcium was already mildly elevated before that, no other supplements noted  Likely secondary to immobilization/ prolonged hospitalization, PTH low normal and not suppressed in the setting of hypercalcemia  No history suggestive of any lymphoma or lymphadenopathy or any granulomatosis related disorders to suggest any additional etiology.  --Tube feeds were adjusted 8/15 to lower calcium content   -- Started on  ml every 2 hours enterally on 8/14/2021, then serum sodium 150 8/16/2021, stopped NS flushes in tube feeds 8/16/2021, started D5W at 100 ml/hr and dosed IV Lasix 20 mg. Sodium improved to 145 this AM and ca slowly improving to 10.6 today   --Will stop D5W. Start free water flushes 200cc q4h. Start po Lasix 20 mg BID down feeding tube.   -- Check BMP tomorrow      Renal function preserved  Creatinine based GFR of 89-90 creatinine 0.77  Unclear with prolonged illness, sarcopenia, if it is overestimated  --Continue to monitor.   --24 cr clearance difficult to collect with condom cath. No need for  shantanu just to complete this study. Will cancel 24h cr clearance      Hypertension lisinopril 40 mg  euvolemic to mild volume depletion  Likely elevated insensible losses with vent and tachypnea  --Blood pressure stable      Anemia  Inflammatory  Mild management per hospitalist medicine     Acute on chronic hypoxemic respiratory failure  S/p trach  ESBL Pseudomonas and Klebsiella pneumonia  Had tobramycin nebs for MDR Pseudomonas  Pulmonary following  Currently off antibiotics     Malnutrition on tube feed     SAH w fall sp craniotomy and hematoma evacuation , PCA aneurysm clip  cb BAYLEE stroke ad vasopasm  No on rehab     Insomnia/mood disorders  On Ritalin and mirtazapine  Management per primary medicine     Metabolic encephalopathy stable    SUBJECTIVE:   Sodium improved 150>>145 today  Stop D5W  Start free water flushes 200 ml q4h  Start po Lasix 20 mg BID down feeding tube  Discussed with patient's wife over ipad     OBJECTIVE:  Physical Exam   Temp: 98.7  F (37.1  C) Temp src: Axillary BP: (!) 141/86 Pulse: 87   Resp: 26 SpO2: 94 % O2 Device: Trach dome Oxygen Delivery: 20 LPM  Vitals:    08/15/21 0624 08/16/21 0627 08/17/21 0612   Weight: 68.5 kg (151 lb 0.6 oz) 69 kg (152 lb 2 oz) 68.6 kg (151 lb 4.8 oz)     Vital Signs with Ranges  Temp:  [97.7  F (36.5  C)-98.8  F (37.1  C)] 98.7  F (37.1  C)  Pulse:  [81-94] 87  Resp:  [20-30] 26  BP: (127-151)/(78-91) 141/86  FiO2 (%):  [21 %-26 %] 21 %  SpO2:  [94 %-97 %] 94 %  I/O last 3 completed shifts:  In: 4896 [I.V.:1917; NG/GT:2979]  Out: 2500 [Urine:2500]      Patient Vitals for the past 72 hrs:   Weight   08/17/21 0612 68.6 kg (151 lb 4.8 oz)   08/16/21 0627 69 kg (152 lb 2 oz)   08/15/21 0624 68.5 kg (151 lb 0.6 oz)       Intake/Output Summary (Last 24 hours) at 8/16/2021 1118  Last data filed at 8/16/2021 1000  Gross per 24 hour   Intake 4214 ml   Output 1150 ml   Net 3064 ml       PHYSICAL EXAM:  General: Awake, NAD, OT working with patient   HENT: Tramaine,  Non-tender, no obvious JVD  Eyes: No scleral icterus  Cardiovascular: RRR, no rub, gallop, or murmur. No-trace LE edema.  Respiratory: Non-labored, trach +  Gastrointestinal: Soft, non-tender, non-distended  Integumentary: Warm, dry, no rash  Neurologic: Grossly intact     LABORATORY STUDIES:     Recent Labs   Lab 08/16/21  0649   WBC 6.7   RBC 3.03*   HGB 9.6*   HCT 29.5*          Basic Metabolic Panel:  Recent Labs   Lab 08/17/21  0626 08/16/21  0649 08/15/21  0716 08/14/21  0704 08/13/21  0604 08/11/21  0610 08/10/21  2030    150*  --   --  141 143  --    POTASSIUM 4.1 4.4  --   --  4.5 4.4  --    CHLORIDE 106 113*  --   --  103 103  --    CO2 29 28  --   --  28 32*  --    BUN 32* 35*  --   --  46* 43*  --    CR 0.77 0.77  --   --  0.87 0.88  --     126*  --   --  110 115 108   RAGINI 10.6* 10.7* 10.7* 11.2* 11.0* 11.2*  --        INRNo lab results found in last 7 days.     Recent Labs   Lab Test 08/16/21  0649 08/09/21  0615 05/16/21  0405 05/15/21  2125 05/15/21  1222   INR  --   --   --  1.20* 1.16*   WBC 6.7 8.0   < > 12.3* 16.5*   HGB 9.6* 10.7*   < > 11.8* 13.6    289   < > 239 257    < > = values in this interval not displayed.       Personally reviewed current labs    Elpidio Rowley PA-C  [facility]  [phone]

## 2021-08-17 NOTE — PLAN OF CARE
Problem: Restraint for Non-Violent/Non-Self-Destructive Behavior  Goal: Prevent/Manage Potential Problems  Description: Maintain safety of patient and others during period of restraint.  Promote psychological and physical wellbeing.  Prevent injury to skin and involved body parts.  Promote nutrition, hydration, and elimination.  Outcome: No Change   Patient had mitts x 2; hands active with restraint release, fidgeting on sheets.  No s/s of pain noted; patient slept well in between cares, awakened easily during rounds.  Patient's last BM was on 8/10; was given MOM on alejandro shift with no effect.  PRN bisacodyl given.  Urine container in an iced basin for 24 hour urine collection.  Patient tolerated cares/suctioning; repositioned q 2 hours.  Problem: Adult Inpatient Plan of Care  Goal: Optimal Comfort and Wellbeing  Outcome: Improving     Problem: Anxiety  Goal: Anxiety Reduction or Resolution  Outcome: Improving     Problem: Communication Impairment (Artificial Airway)  Goal: Effective Communication  Outcome: Improving     Problem: Adult Inpatient Plan of Care  Goal: Absence of Hospital-Acquired Illness or Injury  Intervention: Identify and Manage Fall Risk  Recent Flowsheet Documentation  Taken 8/17/2021 0100 by Елена Rai RN  Safety Promotion/Fall Prevention:   bed alarm on   room door open   room near nurse's station   safety round/check completed  Intervention: Prevent Infection  Recent Flowsheet Documentation  Taken 8/17/2021 0100 by Елена Rai RN  Infection Prevention:   hand hygiene promoted   personal protective equipment utilized   rest/sleep promoted     Problem: Device-Related Complication Risk (Mechanical Ventilation, Invasive)  Goal: Optimal Device Function  Intervention: Optimize Device Care and Function  Recent Flowsheet Documentation  Taken 8/17/2021 0100 by Елена Rai RN  Airway Safety Measures:   all equipment/monitors on and audible   suction equipment   oxygen flowmeter   suction  regulator     Problem: Ventilator-Induced Lung Injury (Mechanical Ventilation, Invasive)  Goal: Absence of Ventilator-Induced Lung Injury  Intervention: Prevent Ventilator-Associated Pneumonia  Recent Flowsheet Documentation  Taken 8/17/2021 0400 by Елена Rai, RN  Oral Care: swabbed with sterile water     Problem: Device-Related Complication Risk (Artificial Airway)  Goal: Optimal Device Function  Intervention: Optimize Device Care and Function  Recent Flowsheet Documentation  Taken 8/17/2021 0400 by Елена Rai, RN  Oral Care: swabbed with sterile water  Taken 8/17/2021 0100 by Елена Rai, RN  Airway Safety Measures:   all equipment/monitors on and audible   suction equipment   oxygen flowmeter   suction regulator

## 2021-08-18 ENCOUNTER — APPOINTMENT (OUTPATIENT)
Dept: SPEECH THERAPY | Facility: CLINIC | Age: 71
DRG: 207 | End: 2021-08-18
Attending: HOSPITALIST
Payer: COMMERCIAL

## 2021-08-18 ENCOUNTER — APPOINTMENT (OUTPATIENT)
Dept: PHYSICAL THERAPY | Facility: CLINIC | Age: 71
DRG: 207 | End: 2021-08-18
Attending: HOSPITALIST
Payer: COMMERCIAL

## 2021-08-18 ENCOUNTER — APPOINTMENT (OUTPATIENT)
Dept: OCCUPATIONAL THERAPY | Facility: CLINIC | Age: 71
DRG: 207 | End: 2021-08-18
Attending: HOSPITALIST
Payer: COMMERCIAL

## 2021-08-18 LAB
ANION GAP SERPL CALCULATED.3IONS-SCNC: 12 MMOL/L (ref 5–18)
BUN SERPL-MCNC: 33 MG/DL (ref 8–22)
CALCIUM SERPL-MCNC: 10.6 MG/DL (ref 8.5–10.5)
CHLORIDE BLD-SCNC: 102 MMOL/L (ref 98–107)
CO2 SERPL-SCNC: 28 MMOL/L (ref 22–31)
CREAT SERPL-MCNC: 0.81 MG/DL (ref 0.7–1.3)
GFR SERPL CREATININE-BSD FRML MDRD: >90 ML/MIN/1.73M2
GLUCOSE BLD-MCNC: 119 MG/DL (ref 70–125)
POTASSIUM BLD-SCNC: 4.5 MMOL/L (ref 3.5–5)
SODIUM SERPL-SCNC: 142 MMOL/L (ref 136–145)

## 2021-08-18 PROCEDURE — 250N000013 HC RX MED GY IP 250 OP 250 PS 637: Performed by: NURSE PRACTITIONER

## 2021-08-18 PROCEDURE — 250N000013 HC RX MED GY IP 250 OP 250 PS 637: Performed by: HOSPITALIST

## 2021-08-18 PROCEDURE — 97129 THER IVNTJ 1ST 15 MIN: CPT | Mod: GO | Performed by: OCCUPATIONAL THERAPIST

## 2021-08-18 PROCEDURE — 250N000013 HC RX MED GY IP 250 OP 250 PS 637: Performed by: INTERNAL MEDICINE

## 2021-08-18 PROCEDURE — 36415 COLL VENOUS BLD VENIPUNCTURE: CPT | Performed by: PHYSICIAN ASSISTANT

## 2021-08-18 PROCEDURE — 250N000009 HC RX 250: Performed by: INTERNAL MEDICINE

## 2021-08-18 PROCEDURE — 999N000157 HC STATISTIC RCP TIME EA 10 MIN

## 2021-08-18 PROCEDURE — 97110 THERAPEUTIC EXERCISES: CPT | Mod: GO | Performed by: OCCUPATIONAL THERAPIST

## 2021-08-18 PROCEDURE — 94640 AIRWAY INHALATION TREATMENT: CPT | Mod: 76

## 2021-08-18 PROCEDURE — 97112 NEUROMUSCULAR REEDUCATION: CPT | Mod: GP | Performed by: PHYSICAL THERAPIST

## 2021-08-18 PROCEDURE — 92507 TX SP LANG VOICE COMM INDIV: CPT | Mod: GN

## 2021-08-18 PROCEDURE — 250N000013 HC RX MED GY IP 250 OP 250 PS 637

## 2021-08-18 PROCEDURE — 94668 MNPJ CHEST WALL SBSQ: CPT

## 2021-08-18 PROCEDURE — 80048 BASIC METABOLIC PNL TOTAL CA: CPT | Performed by: PHYSICIAN ASSISTANT

## 2021-08-18 PROCEDURE — 120N000017 HC R&B RESPIRATORY CARE

## 2021-08-18 PROCEDURE — 94660 CPAP INITIATION&MGMT: CPT

## 2021-08-18 PROCEDURE — 999N000009 HC STATISTIC AIRWAY CARE

## 2021-08-18 PROCEDURE — 94003 VENT MGMT INPAT SUBQ DAY: CPT | Performed by: NURSE PRACTITIONER

## 2021-08-18 PROCEDURE — 999N000123 HC STATISTIC OXYGEN O2DAILY TECH TIME

## 2021-08-18 PROCEDURE — 97530 THERAPEUTIC ACTIVITIES: CPT | Mod: GP | Performed by: PHYSICAL THERAPIST

## 2021-08-18 PROCEDURE — 99232 SBSQ HOSP IP/OBS MODERATE 35: CPT | Performed by: PHYSICIAN ASSISTANT

## 2021-08-18 PROCEDURE — 94640 AIRWAY INHALATION TREATMENT: CPT

## 2021-08-18 PROCEDURE — 250N000011 HC RX IP 250 OP 636

## 2021-08-18 PROCEDURE — 99207 PR CDG-CODE CATEGORY CHANGED: CPT | Performed by: NURSE PRACTITIONER

## 2021-08-18 PROCEDURE — 250N000013 HC RX MED GY IP 250 OP 250 PS 637: Performed by: PHYSICIAN ASSISTANT

## 2021-08-18 RX ORDER — POLYETHYLENE GLYCOL 3350 17 G/17G
17 POWDER, FOR SOLUTION ORAL ONCE
Status: COMPLETED | OUTPATIENT
Start: 2021-08-18 | End: 2021-08-18

## 2021-08-18 RX ORDER — VENLAFAXINE 25 MG/1
50 TABLET ORAL
Status: DISCONTINUED | OUTPATIENT
Start: 2021-08-18 | End: 2021-09-27 | Stop reason: HOSPADM

## 2021-08-18 RX ADMIN — HEPARIN SODIUM 5000 UNITS: 5000 INJECTION, SOLUTION INTRAVENOUS; SUBCUTANEOUS at 21:03

## 2021-08-18 RX ADMIN — MAGNESIUM HYDROXIDE 30 ML: 400 SUSPENSION ORAL at 06:54

## 2021-08-18 RX ADMIN — LISINOPRIL 40 MG: 20 TABLET ORAL at 18:47

## 2021-08-18 RX ADMIN — ANORECTAL OINTMENT: 15.7; .44; 24; 20.6 OINTMENT TOPICAL at 13:07

## 2021-08-18 RX ADMIN — HEPARIN SODIUM 5000 UNITS: 5000 INJECTION, SOLUTION INTRAVENOUS; SUBCUTANEOUS at 05:33

## 2021-08-18 RX ADMIN — MIRTAZAPINE 7.5 MG: 7.5 TABLET ORAL at 21:03

## 2021-08-18 RX ADMIN — ANORECTAL OINTMENT: 15.7; .44; 24; 20.6 OINTMENT TOPICAL at 08:41

## 2021-08-18 RX ADMIN — ACETAMINOPHEN ORAL SOLUTION 650 MG: 650 SOLUTION ORAL at 13:05

## 2021-08-18 RX ADMIN — ACETYLCYSTEINE 2 ML: 200 SOLUTION ORAL; RESPIRATORY (INHALATION) at 07:00

## 2021-08-18 RX ADMIN — POLYETHYLENE GLYCOL 3350 17 G: 17 POWDER, FOR SOLUTION ORAL at 09:46

## 2021-08-18 RX ADMIN — VENLAFAXINE 37.5 MG: 37.5 TABLET ORAL at 08:41

## 2021-08-18 RX ADMIN — SODIUM CHLORIDE 30 MG/ML INHALATION SOLUTION 3 ML: 30 SOLUTION INHALANT at 07:00

## 2021-08-18 RX ADMIN — IPRATROPIUM BROMIDE AND ALBUTEROL SULFATE 3 ML: 2.5; .5 SOLUTION RESPIRATORY (INHALATION) at 19:42

## 2021-08-18 RX ADMIN — FAMOTIDINE 20 MG: 20 TABLET, FILM COATED ORAL at 08:41

## 2021-08-18 RX ADMIN — FAMOTIDINE 20 MG: 20 TABLET, FILM COATED ORAL at 21:03

## 2021-08-18 RX ADMIN — IPRATROPIUM BROMIDE AND ALBUTEROL SULFATE 3 ML: 2.5; .5 SOLUTION RESPIRATORY (INHALATION) at 07:00

## 2021-08-18 RX ADMIN — HEPARIN SODIUM 5000 UNITS: 5000 INJECTION, SOLUTION INTRAVENOUS; SUBCUTANEOUS at 13:06

## 2021-08-18 RX ADMIN — ANORECTAL OINTMENT: 15.7; .44; 24; 20.6 OINTMENT TOPICAL at 21:04

## 2021-08-18 RX ADMIN — VENLAFAXINE 50 MG: 25 TABLET ORAL at 17:02

## 2021-08-18 RX ADMIN — ACETYLCYSTEINE 2 ML: 200 SOLUTION ORAL; RESPIRATORY (INHALATION) at 19:42

## 2021-08-18 RX ADMIN — SODIUM CHLORIDE 30 MG/ML INHALATION SOLUTION 3 ML: 30 SOLUTION INHALANT at 19:42

## 2021-08-18 RX ADMIN — FUROSEMIDE 20 MG: 20 TABLET ORAL at 13:05

## 2021-08-18 RX ADMIN — FUROSEMIDE 20 MG: 20 TABLET ORAL at 05:33

## 2021-08-18 RX ADMIN — METHYLPHENIDATE HYDROCHLORIDE 10 MG: 10 TABLET ORAL at 05:33

## 2021-08-18 ASSESSMENT — MIFFLIN-ST. JEOR: SCORE: 1494.04

## 2021-08-18 NOTE — PROGRESS NOTES
RENAL PROGRESS NOTE    CC:  Hypercalcemia     ASSESSMENT & PLAN:   70 year old male with past medical history of hypertension, admitted with subarachnoid hemorrhage after a fall on 5/15/2021 s/p emergent left external ventricular drain placement with posterior communicating artery aneurysm defied structured underwent craniotomy with clipping of aneurysm , complicated by persistent cerebral vasospasms and bilateral BAYLEE infarct.  Course also complicated by respiratory failure with intubation pneumonia was treated with cefepime and Vanco, transferred to LTAC on 6/11/2021.  Nephrology consulted for hypercalcemia on 8/15/2021     Hypercalcemia  Preserved renal functions  Calcium has been high normal since early July 1 noted 7/5/2021 @ 10.6, ionized calcium 1.4  Was progressively worsening to 11s, with ionized calcium of 1.4-1.45  PTH 26 on 8/11/2021, vitamin D level 30 8/15/2021  Has been on Ritalin since mid July, calcium was already mildly elevated before that, no other supplements noted  Likely secondary to immobilization/ prolonged hospitalization, PTH low normal and not suppressed in the setting of hypercalcemia  No history suggestive of any lymphoma or lymphadenopathy or any granulomatosis related disorders to suggest any additional etiology.  --Tube feeds were adjusted 8/15 to lower calcium content   -- Started on  ml every 2 hours enterally on 8/14/2021, then serum sodium 150 8/16/2021, stopped NS flushes in tube feeds 8/16/2021, started D5W at 100 ml/hr and dosed IV Lasix 20 mg. Sodium improved to normal and ca slowly improving to 10.6 today   --Stopped D5W on 8/17/21. Started free water flushes 200cc q4h. Started po Lasix 20 mg BID down feeding tube 8/17/21.   -- Check BMP 8/20/21     Renal function preserved  Creatinine based GFR of 89-90 creatinine 0.77  Unclear with prolonged illness, sarcopenia, if it is overestimated  --Continue to monitor.        Hypertension lisinopril 40 mg  euvolemic to mild  volume depletion  Likely elevated insensible losses with vent and tachypnea  --Blood pressure stable      Anemia  Inflammatory  Mild management per hospitalist medicine     Acute on chronic hypoxemic respiratory failure  S/p trach  ESBL Pseudomonas and Klebsiella pneumonia  Had tobramycin nebs for MDR Pseudomonas  Pulmonary following  Currently off antibiotics     Malnutrition on tube feed     SAH w fall sp craniotomy and hematoma evacuation , PCA aneurysm clip  cb BAYLEE stroke ad vasopasm  No on rehab     Insomnia/mood disorders  On Ritalin and mirtazapine  Management per primary medicine     Metabolic encephalopathy stable    SUBJECTIVE:   Sodium improved   Free water flushes 200 ml q4h  po Lasix 20 mg BID down feeding tube  Discussed with patient's wife over ipad   Speech therapy present and working with patient today     OBJECTIVE:  Physical Exam   Temp: 98.3  F (36.8  C) Temp src: Axillary BP: 123/80 Pulse: 86   Resp: 24 SpO2: 94 % O2 Device: [employer] dome Oxygen Delivery: 20 LPM  Vitals:    08/16/21 0627 08/17/21 0612 08/18/21 0545   Weight: 69 kg (152 lb 2 oz) 68.6 kg (151 lb 4.8 oz) 70.2 kg (154 lb 12.8 oz)     Vital Signs with Ranges  Temp:  [97.6  F (36.4  C)-98.3  F (36.8  C)] 98.3  F (36.8  C)  Pulse:  [80-89] 86  Resp:  [20-26] 24  BP: (121-153)/(72-89) 123/80  FiO2 (%):  [21 %] 21 %  SpO2:  [92 %-96 %] 94 %  I/O last 3 completed shifts:  In: 2652 [NG/GT:2652]  Out: 700 [Urine:700]      Patient Vitals for the past 72 hrs:   Weight   08/18/21 0545 70.2 kg (154 lb 12.8 oz)   08/17/21 0612 68.6 kg (151 lb 4.8 oz)   08/16/21 0627 69 kg (152 lb 2 oz)       Intake/Output Summary (Last 24 hours) at 8/16/2021 1118  Last data filed at 8/16/2021 1000  Gross per 24 hour   Intake 4214 ml   Output 1150 ml   Net 3064 ml       PHYSICAL EXAM:  General: Awake, NAD, OT working with patient   HENT: Supple, Non-tender, no obvious JVD  Eyes: No scleral icterus  Cardiovascular: RRR, no rub, gallop, or murmur. No-trace LE  edema.  Respiratory: Non-labored, trach +  Gastrointestinal: Soft, non-tender, non-distended  Integumentary: Warm, dry, no rash  Neurologic: Grossly intact     LABORATORY STUDIES:     Recent Labs   Lab 08/16/21  0649   WBC 6.7   RBC 3.03*   HGB 9.6*   HCT 29.5*          Basic Metabolic Panel:  Recent Labs   Lab 08/18/21  0824 08/17/21  0626 08/16/21  0649 08/15/21  0716 08/14/21  0704 08/13/21  0604    145 150*  --   --  141   POTASSIUM 4.5 4.1 4.4  --   --  4.5   CHLORIDE 102 106 113*  --   --  103   CO2 28 29 28  --   --  28   BUN 33* 32* 35*  --   --  46*   CR 0.81 0.77 0.77  --   --  0.87    110 126*  --   --  110   RAGINI 10.6* 10.6* 10.7* 10.7* 11.2* 11.0*       INRNo lab results found in last 7 days.     Recent Labs   Lab Test 08/16/21  0649 08/09/21  0615 05/16/21  0405 05/15/21  2125 05/15/21  1222   INR  --   --   --  1.20* 1.16*   WBC 6.7 8.0   < > 12.3* 16.5*   HGB 9.6* 10.7*   < > 11.8* 13.6    289   < > 239 257    < > = values in this interval not displayed.       Personally reviewed current labs    Elpidio Rowley PA-C  [facility]  [phone]

## 2021-08-18 NOTE — PLAN OF CARE
Problem: Mechanical Ventilation  Goal: Patient will maintain patent airway  Outcome: Progressing  Goal: Tracheostomy will be managed safely  Outcome: Progressing    RT PROGRESS NOTE     DATA:     CURRENT SETTINGS:               TRACH TYPE / SIZE: #6 Bivona, placed 7/28/2021             MODE:  TM 20L, cuff down   FIO2:  21%     ACTION:             THERAPIES: Duo/ Mucomyst neb via Metaneb, Sodium Chloride neb given on scheduled time             SUCTION:                           FREQUENCY: 3                        AMOUNT: copious/ mod/ small                        CONSISTENCY: thick                        COLOR:   white             SPONTANEOUS COUGH EFFORT/STRENGTH OF EFFORT (not elicited by suctioning): not observed                            WEANING PHASE:   2                        WEAN MODE: 21% TM 20L/ PMV                        WEAN TIME: PMV cont since 0719                        END WEAN REASON:      RESPONSE:             BS:  slightly coarse - clear decreased after Sx              VITAL SIGNS: O2 sat 92-98%, HR 85-90, RR 22-24             EMOTIONAL NEEDS / CONCERNS:  None               RISK FOR SELF DECANNULATION:  Yes                        RISK DUE TO: Confusion                        INTERVENTION/S IN PLACE IS/ARE: Restraints x2       NOTE / PLAN:  cont current plan of care - TM with PMV during the day, PMV off for night with cuff deflated.

## 2021-08-18 NOTE — PROGRESS NOTES
21 1100   Signing Clinician's Name / Credentials   Signing clinician's name / credentials Sheridan Clark MA, CCC-SLP   Quick Adds   Rehab Discipline SLP   SLP - Cognitive Linguistic   Minutes of Treatment 10 minutes   Treatment Detail To improve awareness of environmental and personal information, orientation was targeted. Patient was independently oriented to self, names of wife & daughter, and city of residence. He was not oriented to his , his age, type of building, state, or reason for hospitalization. He denied being a patient in the hospital. Patient did not benefit from moderate cues, including repetition and F:2 verbal choices. He was highly distracted by environmental stimuli throughout session. This was reduced as much possible, but patient continued to reach out for items that he saw on his bed or across the room. He was difficult to redirect and keep on task.   SLP - Dysphagia/Swallow   Minutes of Treatment 15 minutes   Treatment Detail Patient completed 8 effortful swallows. He required mod-max verbal cues, physical prompts, and moistened oral swabs to initiate swallows. He coughed intermittently with swabs. Copious audible pharyngeal secretions noted when patient coughs. He is unable to bring secretions up into his mouth. Clinician completed deep oral suctioning with yankauer 1x during session for large amount of pharyngeal secretions/saliva. Speech Therapy has been targeting swallow intensely for several weeks at the request of patient's family. In that time, there has been no functional improvement in patient's ability to manage his oropharyngeal secretions. He has not demonstrated increased awareness of the need to swallow his secretions in order to help clear them. Additionally, he presents with ongoing s/sx of aspiration with both his own saliva/secretions, as well as moistened oral swabs. Speech Therapy will continue to monitor patient's swallow function indirectly (e.g., during oral  cares), but will discontinue intensive dysphagia treatment at this time. Clinician explained this to patient's wife, who was present via iPad. She expressed disappointment that patient's swallow function has not improved and fears that he has no quality of life without the ability to eat or drink. Clinician informed wife that patient has not been requesting food or liquids so far during this admission. In fact, he likely has minimal to no awareness that he has been NPO since onset of stroke. Explained to wife that this is actually a blessing, as having to deny patients food and liquid when they are frequently requesting it is not a pleasant thing to have to do. Wife verbalized understanding but is still hopeful that patient's swallowing ability will return in time.   SLP Discharge Planning    SLP Discharge Recommendation (DC Rec) Transitional Care Facility   SLP Rationale for DC Rec Impaired cognition and difficulty participating in intensive treatment   SLP Brief overview of current status  Patient continues to present with clinical s/sx of aspiration with moistened oral swabs as well as his own saliva/secretions. Continue NPO. Patient's limited ability to attend to therapist/task continues to be a barrier to participation/progress.   Additional Documentation   SLP Plan Address basic attention, orientation, auditory comprehension, & verbal expression. Monitor swallow indirectly during oral cares, etc., but discontinue effortful swallow exercise.   Total Session Time   Total Session Time (minutes) 25 minutes  (10 cognition; 15 dysphagia)

## 2021-08-18 NOTE — PLAN OF CARE
Problem: Anxiety  Goal: Anxiety Reduction or Resolution  Outcome: No Change     Problem: Restraint for Non-Violent/Non-Self-Destructive Behavior  Goal: Prevent/Manage Potential Problems  Description: Maintain safety of patient and others during period of restraint.  Promote psychological and physical wellbeing.  Prevent injury to skin and involved body parts.  Promote nutrition, hydration, and elimination.  Outcome: No Change     Problem: Adult Inpatient Plan of Care  Goal: Absence of Hospital-Acquired Illness or Injury  Intervention: Identify and Manage Fall Risk  Recent Flowsheet Documentation  Taken 8/18/2021 0213 by Kristal Jaramillo RN  Safety Promotion/Fall Prevention: bed alarm on     Problem: Adult Inpatient Plan of Care  Goal: Absence of Hospital-Acquired Illness or Injury  Intervention: Identify and Manage Fall Risk  Recent Flowsheet Documentation  Taken 8/18/2021 0213 by Kristal Jaramillo RN  Safety Promotion/Fall Prevention: bed alarm on     Problem: Adult Inpatient Plan of Care  Goal: Absence of Hospital-Acquired Illness or Injury  Intervention: Identify and Manage Fall Risk  Recent Flowsheet Documentation  Taken 8/18/2021 0213 by Kristal Jaramillo RN  Safety Promotion/Fall Prevention: bed alarm on     Problem: Adult Inpatient Plan of Care  Goal: Absence of Hospital-Acquired Illness or Injury  Intervention: Identify and Manage Fall Risk  Recent Flowsheet Documentation  Taken 8/18/2021 0213 by Kristal Jaramillo RN  Safety Promotion/Fall Prevention: bed alarm on       Slept 5-6 hours the whole night, calm. Bilateral mitts in place for safety, monitored.

## 2021-08-18 NOTE — PLAN OF CARE
Patient was up on the chair for about 4 hours per wife request. Patient had given stool softener per order with the result of xxl stool. Patient c/o head ache,tylenol given with effect. Patient is resting at this time. Will continue to monitor.

## 2021-08-18 NOTE — PROGRESS NOTES
Pulmonary Medicine Follow up Note - Ventilator Rounds:    Clinical status discussed today with RN and respiratory therapist.    Chief complaint: Ongoing respiratory failure  Significant change in clinical status during past 24 hours? No    Up in chair, working with therapy when I saw him. Left side neglect and gets distracted very easily.      FiO2 (%): 21 %  Resp: 24    Tracheal secretions: Secretions are well controlled during they with PMV in place. Required suction 7 times overnight.     Current phase of ventilator weaning pathway:  Phase 2 with PMV days as tolerated, PMV off with cuff inflated at night    Ventilator weaning results from yesterday: TM 0.3/30Lpm, PMV all day  PMV off for night, cuff up.     Current Facility-Administered Medications   Medication     acetaminophen (TYLENOL) solution 650 mg    Or     acetaminophen (TYLENOL) tablet 650 mg     acetylcysteine (MUCOMYST) 20 % nebulizer solution 2 mL     alteplase (CATHFLO ACTIVASE) injection 2 mg     bisacodyl (DULCOLAX) Suppository 10 mg     dextrose 10% infusion     dextrose 50 % injection 25-50 mL    Or     glucagon injection 1 mg     docusate (COLACE) 50 MG/5ML liquid 100 mg    Or     docusate sodium (COLACE) capsule 100 mg     famotidine (PEPCID) tablet 20 mg     furosemide (LASIX) tablet 20 mg     heparin ANTICOAGULANT injection 5,000 Units     hydrALAZINE (APRESOLINE) injection 10 mg     ipratropium - albuterol 0.5 mg/2.5 mg/3 mL (DUONEB) neb solution 3 mL     lisinopril (ZESTRIL) tablet 40 mg     loperamide (IMODIUM) liquid 1 mg     magnesium hydroxide (MILK OF MAGNESIA) suspension 30 mL     menthol-zinc oxide (CALMOSEPTINE) 0.44-20.6 % ointment OINT     methylphenidate (RITALIN) tablet 10 mg     mirtazapine (REMERON) tablet TABS 7.5 mg     naloxone (NARCAN) injection 0.2 mg    Or     naloxone (NARCAN) injection 0.4 mg    Or     naloxone (NARCAN) injection 0.2 mg    Or     naloxone (NARCAN) injection 0.4 mg     ondansetron (ZOFRAN) solution 4 mg  "    polyethylene glycol (MIRALAX) Packet 17 g     scopolamine (TRANSDERM) 72 hr patch 1 patch    And     scopolamine (TRANSDERM-SCOP) Patch in Place     silver nitrate (ARZOL) Misc     sodium chloride (NEBUSAL) 3 % neb solution 3 mL     sodium chloride 0.9% infusion     venlafaxine (EFFEXOR) tablet 50 mg     Physical exam     /80 (BP Location: Left arm)   Pulse 86   Temp 98.3  F (36.8  C) (Axillary)   Resp 24   Ht 1.803 m (5' 10.98\")   Wt 70.2 kg (154 lb 12.8 oz)   SpO2 94%   BMI 21.60 kg/m      Gen: awake, tracks intermittently, no distress  HEENT: AT/NC. Trach in place.   Lungs: diminished ant otherwise clear, no wheezing or rhonchi.   CV: regular, no murmurs or gallops appreciated  Abdomen: soft, NT, BS wnl  Ext: no edema  Neuro: awake, tracking intermittently, smiles. Leans to his Right.     Sodium   Date Value Ref Range Status   08/18/2021 142 136 - 145 mmol/L Final   06/11/2021 143 133 - 144 mmol/L Final     Potassium   Date Value Ref Range Status   08/18/2021 4.5 3.5 - 5.0 mmol/L Final   06/11/2021 3.8 3.4 - 5.3 mmol/L Final     Chloride   Date Value Ref Range Status   08/18/2021 102 98 - 107 mmol/L Final   06/11/2021 106 94 - 109 mmol/L Final     Carbon Dioxide   Date Value Ref Range Status   06/11/2021 36 (H) 20 - 32 mmol/L Final     Carbon Dioxide (CO2)   Date Value Ref Range Status   08/18/2021 28 22 - 31 mmol/L Final     Anion Gap   Date Value Ref Range Status   08/18/2021 12 5 - 18 mmol/L Final   06/11/2021 2 (L) 3 - 14 mmol/L Final     Glucose   Date Value Ref Range Status   08/18/2021 119 70 - 125 mg/dL Final   06/11/2021 142 (H) 70 - 99 mg/dL Final     Urea Nitrogen   Date Value Ref Range Status   08/18/2021 33 (H) 8 - 22 mg/dL Final   06/11/2021 25 7 - 30 mg/dL Final     Creatinine   Date Value Ref Range Status   08/18/2021 0.81 0.70 - 1.30 mg/dL Final   06/11/2021 0.57 (L) 0.66 - 1.25 mg/dL Final     GFR Estimate   Date Value Ref Range Status   08/18/2021 >90 >60 mL/min/1.73m2 Final    "  Comment:     As of July 11, 2021, eGFR is calculated by the CKD-EPI creatinine equation, without race adjustment. eGFR can be influenced by muscle mass, exercise, and diet. The reported eGFR is an estimation only and is only applicable if the renal function is stable.   07/09/2021 >60 >60 mL/min/1.73m2 Final   06/11/2021 >90 >60 mL/min/[1.73_m2] Final     Comment:     Non  GFR Calc  Starting 12/18/2018, serum creatinine based estimated GFR (eGFR) will be   calculated using the Chronic Kidney Disease Epidemiology Collaboration   (CKD-EPI) equation.       Calcium   Date Value Ref Range Status   08/18/2021 10.6 (H) 8.5 - 10.5 mg/dL Final   06/11/2021 9.6 8.5 - 10.1 mg/dL Final        Diagnosis:     Patient is a 71 yo man with history of hypertension. Patient initially presented on 15-May-2021 to [facility] after being found unresponsive by his wife. Patient was then found to have a subarachnoid hemorrhage secondary to a ruptured aneurysm. Patient was intubated and was started on mannitol, IV antihypertensives and hyperventilation and patient was then transferred to [facility]. Patient underwent aneurysm clipping as well as placement of extra-ventricular drain from hydrocephalus on 15-May-2021. Extra-ventricular drain would malfunction and would require replacement on 04-Jun-2021 and 06-Jun-2021. Extra-ventricular drain reportedly was removed on 09-Jun-2021. Patient had intra-arterial vasospasm treatment with verapamil on 28-May-2021.  Patient was extubated on 16-May-2021 but had to be reintubated on 19-May-2021. Patient would be extubated on 03-Jun-2021 but would be reintubated on 06-Jun-2021. Patient had tracheostomy and PEG-tube placed on 07-Jun-2021. Patient required treatment for hospital-acquired pneumonia. Patient was transferred to [facility] on 11-Jun-2021.    1. Acute respiratory failure s/p trach 6/7/2021  2. S/p treatment pseudomonas ESBL and klebsiella  pneumonia   3. Encephalopathy   4. Subarachnoid bleed s/p craniotomy and clipping P-comm rupture aneurysm.   5. HTN  6. Malnourishment   7. Dysphagia s/p G tube    Recommendations/Plans (MDM):  1. Weaning orders: continue phase 2 with PMV days as tolerated. Will advance to cuff down at night.    2. Trach change? Routine #6 bivona, next changed due 8/28  3. Diagnostic testing? no  4. Continue pulmonary toiletting, scheduled ipratropium-albuterol, 3% saline, acetylcysteine, metaneb  5. Scopolamine patch; would not add additional patch given risk of anticholinergic CNS effects and secretion inspissation     Ambar Pimentel, CNP  [facility] Pulmonary/Critical Care

## 2021-08-18 NOTE — PLAN OF CARE
Problem: Restraint for Non-Violent/Non-Self-Destructive Behavior  Goal: Prevent/Manage Potential Problems  Description: Maintain safety of patient and others during period of restraint.  Promote psychological and physical wellbeing.  Prevent injury to skin and involved body parts.  Promote nutrition, hydration, and elimination.  Outcome: Improving   Patient was calm but still reaching for tubings, Bilateral mittens continued. Patient appeared comfortable, no sign of pain noted.

## 2021-08-18 NOTE — PLAN OF CARE
Problem: Device-Related Complication Risk (Artificial Airway)  Goal: Optimal Device Function  Outcome: Improving     Problem: Skin and Tissue Injury (Artificial Airway)  Goal: Absence of Device-Related Skin or Tissue Injury  Outcome: Improving     Problem: Communication Impairment (Artificial Airway)  Goal: Effective Communication  Outcome: Improving  RT PROGRESS NOTE     DATA:     CURRENT SETTINGS:             TRACH TYPE / SIZE: #6 Bivona (Cuff up, placed 07/28)             MODE:  TM             FIO2:   21%, 20 L     ACTION:             THERAPIES: Duo-neb/Muco/Saline/MetaNeb BID             SUCTION: Yes                         FREQUENCY: 6x                        AMOUNT:   Moderate x5 to Large x1                        CONSISTENCY: Thick                        COLOR:   White             SPONTANEOUS COUGH EFFORT/STRENGTH OF EFFORT (not elicited by suctioning): Strong productive cough.                              WEANING PHASE:   2                        WEAN MODE:  TM with PMV                        WEAN TIME:   12 hours and 5 minutes.                        END WEAN REASON: Cuff up at noc per order.     RESPONSE:             BS:   Coarse             VITAL SIGNS: Sat 93-96%, HR 80-84, RR 20-24             EMOTIONAL NEEDS / CONCERNS:  None                RISK FOR SELF DECANNULATION:  Yes                        RISK DUE TO:  Confusion                        INTERVENTION/S IN PLACE IS/ARE: Mitts x2       NOTE / PLAN:     Pt remains on TM and tolerating well with no distress.  Continue current care plan.

## 2021-08-18 NOTE — PROGRESS NOTES
[facility]    Medicine Progress Note - Hospitalist Service       Date of Admission:  6/11/2021    Assessment & Plan         Summary:   Dayron Neri is a 70 year male with h/o HTN who presented to ED on 5/15/2021 with acute SAH after a fall with unresponsiveness.  He underwent emergent left external ventricular drain placement.  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  On 5/28/2021, patient had persistent cerebral vasospasm, CT head showed bilateral BAYLEE infarct and was treated with milrinone and verapamil.  On 6/1/2021, angiogram showed no evidence of vasospasm.  Extubated on 6/3/2021.  6/6/2021 he was reintubated due to hypoxia.  On 6/7/2021, patient underwent tracheostomy and PEG tube placement.  EVD removed 6/9/2021.   Patient was treated for possible pneumonia with Unasyn on 5/20/2021, It was switched to ceftriaxone the next day for sputum culture growing Klebsiella. On 5/23, sputum culture also grew Pseudomonas. Antibiotic was switched to Zosyn.  Due to increased WBCs in CSF, possibility of ventriculitis was raised. CSF culture was negative. Antibiotic was switched to cefepime and vancomycin on 5/28/2021 for 2 weeks.  Vanco was discontinued on 6/9/2021.  On 6/5/2021, sputum grew ESBL so cefepime was switched to meropenem. He was transferred to [facility] on 6/11/21.      Repeat sputum culture 6/19 showed MDR pseudomonas and he was started on tobramycin nebs from 6/24/2021 - 7/9/2021 .  7/8/2021 aspirated tube feeds.  Spiked a fever that evening 102.8 and WBC went up to 36.9 the next morning so ID reconsulted and was started on ceftazadime-avibactam along with flagyl 7/9/2021 - 7/18/21. He has had repeated bouts of Klebsiella infection in sputum and ventilator associated pneumonia.  Additionally he has had a large amount of tracheal secretions, significant cognitive impairment, left-sided neglect and ongoing episodes of  agitation.  Patient's family has been monitoring the patient continuously during the daytime hours via video monitor and visit with him in person over the weekends.       1/.  Acute hypoxemic respiratory failure secondary to prolonged hospitalization, significant intracranial hemorrhage.  - excessive thick white secretions is the main barrier to decannulation   - glycopyrolate was d/thang 2 weeks back per pulmonary medicine  -Continues to be on a scopolamine patch  - RT reporting continued copious thick white secretions with frequent suctioning   - Speaking valve is being trialed during the day and patient is tolerating. PVR off with cuff inflated at night     2/.  Insomnia: Ritalin twice a day, Ritalin at bedtime was probably making insomnia worsen. Bedtime Ritalin stopped 8/3/21 and continues dose of once daily in the morning.  - Started with nighttime mirtazapine 7.5 mg scheduled. Can be titrated up as appropriate.     3/.  Traumatic brain injury/intracranial hemorrhage: Effexor was started about 2 weeks ago at 25 mg 3 times a day, uptitrated to 37.5 mg 3 times a day.  - received request from OT suggesting to add Aricept to improved attention span. Discussed with Dr. Lemus. Aricept unlikely to help attention span. Will increase Effexor to 50 mg three times daily     4/.  Dysphagia: Secondary to respiratory failure continue with tube feeds at this time.  Even though the patient passed a blue dye test about 2 weeks ago but he still has a lot of secretions    5/.  Ventilator associated Klebsiella pneumonia, pseudomonas ESBL  -  appreciate intervention and recommendations by infectious disease, antibiotics course completed 7/18.    - Repeat sputum culture if yellow or increased secretions.     6/.  Prognosis and logistics: This is challenge because patient's wife is definitely having a difficult time with the situation and recognizing that the patient is unlikely going to be his prehospitalization self.     7/.   Restraints: Patient still restless, pulling at tubes. We have been using hand mitts and that seems to be eliminating the need for a sitter. Order renewed today. Staff are taking the mitts off during their assessments and for hygiene cares.     8/. Slow transit constipation  - Give Miralax x 1 today  - Milk of magnesium given x 2, biscodyl supp given x 1 this morning, results pending     9/.Hypercalcemia    - Ca+ 10.6, unchanged from yesterday. Evaluated by nephrology  - PTH 26 on 8/11/2021, vitamin D level low at 25, Has been on Ritalin since mid July calcium was already mildly elevated before that, no other supplements noted. Possibly secondary to immobilization prolonged hospitalization, PTH low normal and not suppressed in the setting of hypercalcemia. No history suggestive of any lymphoma or lymphadenopathy or any granulomatosis related disorders to suggest any additional etiology.  - Dietician changed tube feeds to contain 800 mg less calcium compared to previous formula     10./Hypernatremia  - Na+ 150 on 8/16/21, Normal saline flushes via tube feed stopped. Na+ 142 today  -  Tried D5W 100 ml/hr with IV lasix which was stopped by nephrology. Continue free water flushes with lasix 20 mg via g-tube twice daily     Diet: Adult Formula Drip Feeding: Continuous Lily Farms Peptide 1.5; Gastrostomy; Goal Rate: 80; mL/hr; Medication - Feeding Tube Flush Frequency: At least 15-30 mL water before and after medication administration and with tube clogging; Amount to Send ...    DVT Prophylaxis: Low Risk/Ambulatory with no VTE prophylaxis indicated  Gutierrez Catheter: Not present  Central Lines: None  Code Status: Full Code      Disposition Plan   Expected discharge:      Total floor/unit time spent was 25 minutes in reviewing the record, medications, lab results and completing documentation. 15 minutes spent in counseling and discussion with patient regarding diagnosis, medications and treatment plan. Care discussed and  coordinated with patient and nurse.     [facility]  Hospitalist Service  LTACH  Securely message with the [employer] Web Console (learn more here)  Text page via [employer] Paging/Directory                 ______________________________________________________________________    Interval History   Patient was lying in bed. PMV in place. Whispers and is partially understandable. Patient says his right shoulder hurts. Staff doing frequent repositioning, up to chair at least twice daily. Per RT note, required suctioning x 5 with continued mod to large thick white secretions. Nursing reporting constipation with no recorded bowel movement x 6 days. MOM given last evening and this morning, suppository given this morning.     Review of Systems: 10 point review of systems negative except for pertinent positives mentioned in HPI      Data reviewed today: I reviewed all medications, new labs and imaging results over the last 24 hours. I personally reviewed no images or EKG's today.    Physical Exam   Vital Signs: Temp: 98.3  F (36.8  C) Temp src: Axillary BP: 123/80 Pulse: 86   Resp: 24 SpO2: 94 % O2 Device: Trach dome Oxygen Delivery: 20 LPM  Weight: 154 lbs 12.8 oz  General Appearance:  Alert, calm, no apparent distress   Respiratory: coarse anterior breath sounds, no wheezing or crackles, tracheostomy patent with no visible secretions, PMV in place, surrounding skin around trach dry/intact, no bleeding around trach site   Cardiovascular: S1,S2, rhythm rate regular, negative murmur   GI: Bowel sounds positive x 4, non distended non tender, G/J tube appears patent with tube feeding infusing, surrounding skin dry/intact  Skin: pink dry and intact  Neurological: alert and oriented x 1, whispers/mouths words when speaking valve in place partially understandable, moves upper extremities, able to lift right arm all the way up, moving left arm up but weaker compared to right with chronic left sided neglet, inconsistent  following commands     Data   Medications     dextrose       sodium chloride         acetylcysteine  2 mL Nebulization BID     famotidine  20 mg Oral or Feeding Tube BID     furosemide  20 mg Oral or Feeding Tube BID     heparin ANTICOAGULANT  5,000 Units Subcutaneous Q8H     ipratropium - albuterol 0.5 mg/2.5 mg/3 mL  3 mL Nebulization BID     lisinopril  40 mg Oral or Feeding Tube Daily     menthol-zinc oxide   Topical TID     methylphenidate  10 mg Oral or Feeding Tube QAM AC     mirtazapine  7.5 mg Per Feeding Tube At Bedtime     polyethylene glycol  17 g Per GJ tube Once     scopolamine  1 patch Transdermal Q72H    And     scopolamine   Transdermal Q8H     sodium chloride  3 mL Nebulization BID     venlafaxine  37.5 mg Per Feeding Tube TID w/meals     Restraint:     Patient pulls his IV line and trach.      Within an hour after restraint an in person face to face assessment was completed at 8:30 AM, including an evaluation of the patient's immediate reaction to the intervention, behavioral assessment and review/assessment of history, drugs and medications, recent labs, etc., and behavioral condition.  The patient experienced no adverse physical outcome from seclusion/restraint initiation.  The intervention of restraint or seclusion needs to continue.      Physician Attestation   I, Latonya Lemus MD, A, personally saw and evaluated Dayron Neri as part of a shared visit.  I have reviewed and discussed with the advanced practice provider their care plan        Latonya Lemus MD, A  Date of Service (when I saw the patient): 08/18/21

## 2021-08-19 ENCOUNTER — APPOINTMENT (OUTPATIENT)
Dept: OCCUPATIONAL THERAPY | Facility: CLINIC | Age: 71
DRG: 207 | End: 2021-08-19
Attending: HOSPITALIST
Payer: COMMERCIAL

## 2021-08-19 ENCOUNTER — APPOINTMENT (OUTPATIENT)
Dept: PHYSICAL THERAPY | Facility: CLINIC | Age: 71
DRG: 207 | End: 2021-08-19
Attending: HOSPITALIST
Payer: COMMERCIAL

## 2021-08-19 PROCEDURE — 999N000157 HC STATISTIC RCP TIME EA 10 MIN

## 2021-08-19 PROCEDURE — 250N000013 HC RX MED GY IP 250 OP 250 PS 637: Performed by: HOSPITALIST

## 2021-08-19 PROCEDURE — 94660 CPAP INITIATION&MGMT: CPT

## 2021-08-19 PROCEDURE — 250N000009 HC RX 250: Performed by: INTERNAL MEDICINE

## 2021-08-19 PROCEDURE — 97535 SELF CARE MNGMENT TRAINING: CPT | Mod: GO | Performed by: OCCUPATIONAL THERAPIST

## 2021-08-19 PROCEDURE — 120N000017 HC R&B RESPIRATORY CARE

## 2021-08-19 PROCEDURE — 250N000013 HC RX MED GY IP 250 OP 250 PS 637: Performed by: INTERNAL MEDICINE

## 2021-08-19 PROCEDURE — 250N000011 HC RX IP 250 OP 636

## 2021-08-19 PROCEDURE — 97112 NEUROMUSCULAR REEDUCATION: CPT | Mod: GP | Performed by: PHYSICAL THERAPIST

## 2021-08-19 PROCEDURE — 97110 THERAPEUTIC EXERCISES: CPT | Mod: GP | Performed by: PHYSICAL THERAPIST

## 2021-08-19 PROCEDURE — 94668 MNPJ CHEST WALL SBSQ: CPT

## 2021-08-19 PROCEDURE — 94640 AIRWAY INHALATION TREATMENT: CPT

## 2021-08-19 PROCEDURE — 999N000009 HC STATISTIC AIRWAY CARE

## 2021-08-19 PROCEDURE — 94640 AIRWAY INHALATION TREATMENT: CPT | Mod: 76

## 2021-08-19 PROCEDURE — 17250 CHEM CAUT OF GRANLTJ TISSUE: CPT

## 2021-08-19 PROCEDURE — 250N000013 HC RX MED GY IP 250 OP 250 PS 637: Performed by: PHYSICIAN ASSISTANT

## 2021-08-19 PROCEDURE — 250N000013 HC RX MED GY IP 250 OP 250 PS 637: Performed by: NURSE PRACTITIONER

## 2021-08-19 PROCEDURE — 999N000125 HC STATISTIC PATIENT MED CONFERENCE < 30 MIN: Performed by: OCCUPATIONAL THERAPIST

## 2021-08-19 PROCEDURE — 250N000013 HC RX MED GY IP 250 OP 250 PS 637

## 2021-08-19 RX ORDER — POLYETHYLENE GLYCOL 3350 17 G/17G
17 POWDER, FOR SOLUTION ORAL DAILY
Status: DISCONTINUED | OUTPATIENT
Start: 2021-08-19 | End: 2021-08-20

## 2021-08-19 RX ADMIN — ACETYLCYSTEINE 2 ML: 200 SOLUTION ORAL; RESPIRATORY (INHALATION) at 07:01

## 2021-08-19 RX ADMIN — SODIUM CHLORIDE 30 MG/ML INHALATION SOLUTION 3 ML: 30 SOLUTION INHALANT at 19:11

## 2021-08-19 RX ADMIN — HEPARIN SODIUM 5000 UNITS: 5000 INJECTION, SOLUTION INTRAVENOUS; SUBCUTANEOUS at 21:36

## 2021-08-19 RX ADMIN — FAMOTIDINE 20 MG: 20 TABLET, FILM COATED ORAL at 08:46

## 2021-08-19 RX ADMIN — VENLAFAXINE 50 MG: 25 TABLET ORAL at 12:46

## 2021-08-19 RX ADMIN — HEPARIN SODIUM 5000 UNITS: 5000 INJECTION, SOLUTION INTRAVENOUS; SUBCUTANEOUS at 05:26

## 2021-08-19 RX ADMIN — ANORECTAL OINTMENT: 15.7; .44; 24; 20.6 OINTMENT TOPICAL at 20:54

## 2021-08-19 RX ADMIN — SCOPALAMINE 1 PATCH: 1 PATCH, EXTENDED RELEASE TRANSDERMAL at 13:57

## 2021-08-19 RX ADMIN — ANORECTAL OINTMENT: 15.7; .44; 24; 20.6 OINTMENT TOPICAL at 08:47

## 2021-08-19 RX ADMIN — HEPARIN SODIUM 5000 UNITS: 5000 INJECTION, SOLUTION INTRAVENOUS; SUBCUTANEOUS at 13:55

## 2021-08-19 RX ADMIN — POLYETHYLENE GLYCOL 3350 17 G: 17 POWDER, FOR SOLUTION ORAL at 13:55

## 2021-08-19 RX ADMIN — MIRTAZAPINE 7.5 MG: 7.5 TABLET ORAL at 20:46

## 2021-08-19 RX ADMIN — FUROSEMIDE 20 MG: 20 TABLET ORAL at 13:54

## 2021-08-19 RX ADMIN — ACETYLCYSTEINE 2 ML: 200 SOLUTION ORAL; RESPIRATORY (INHALATION) at 19:11

## 2021-08-19 RX ADMIN — FAMOTIDINE 20 MG: 20 TABLET, FILM COATED ORAL at 20:46

## 2021-08-19 RX ADMIN — METHYLPHENIDATE HYDROCHLORIDE 10 MG: 10 TABLET ORAL at 05:26

## 2021-08-19 RX ADMIN — LISINOPRIL 40 MG: 20 TABLET ORAL at 19:04

## 2021-08-19 RX ADMIN — ANORECTAL OINTMENT: 15.7; .44; 24; 20.6 OINTMENT TOPICAL at 13:57

## 2021-08-19 RX ADMIN — VENLAFAXINE 50 MG: 25 TABLET ORAL at 17:20

## 2021-08-19 RX ADMIN — IPRATROPIUM BROMIDE AND ALBUTEROL SULFATE 3 ML: 2.5; .5 SOLUTION RESPIRATORY (INHALATION) at 19:11

## 2021-08-19 RX ADMIN — SILVER NITRATE APPLICATORS: 25; 75 STICK TOPICAL at 12:46

## 2021-08-19 RX ADMIN — IPRATROPIUM BROMIDE AND ALBUTEROL SULFATE 3 ML: 2.5; .5 SOLUTION RESPIRATORY (INHALATION) at 07:00

## 2021-08-19 RX ADMIN — FUROSEMIDE 20 MG: 20 TABLET ORAL at 05:26

## 2021-08-19 RX ADMIN — VENLAFAXINE 50 MG: 25 TABLET ORAL at 08:46

## 2021-08-19 RX ADMIN — SODIUM CHLORIDE 30 MG/ML INHALATION SOLUTION 3 ML: 30 SOLUTION INHALANT at 07:01

## 2021-08-19 ASSESSMENT — MIFFLIN-ST. JEOR: SCORE: 1486.78

## 2021-08-19 NOTE — PROGRESS NOTES
WOC RN Progress Note    Today's Visit: Nursing asked [facility] to assess previously signed off patient for hypergranulation at trach site.   Patient has circumferential hyperplasia, extending out 0.2cm 8-4 o'clock and up to 0.5cm 4-8 o'clock,with thin layer of hypergranulation 12 o'clock 0.2 x 0.3cm.  Hypergranulation was treated with 1 stick of silver nitrate. Patient tolerated well.   SAUD GarciaN, RN, PHN, HNB-BC, CWOCN

## 2021-08-19 NOTE — PROGRESS NOTES
RT PROGRESS NOTE    DATA  CURRENT SETTINGS --  Ventilation Mode: Trach collar  FiO2 (%): 21 %  Oxygen Concentration (%): 21 %  Resp: 24     TRACH TYPE -- Bivona #6 Cuffed 7/28    ACTION  THERAPIES -- BID Mucomyst, Duoneb, NaCl 3%  SUCTION   FREQUENCY -- 6   AMOUNT -- moderate to large    CONSISTENCY -- thick    COLOR -- white    SPONTANEOUS COUGH -- strong   WEAN PHASE #2   MODE -- HTD 21% 20 LPM    DURATION -- 24/7   END REASON -- None     RESPONSE  BREATH SOUNDS -- Coarse  VITAL SIGNS --  Temp:  [97.6  F (36.4  C)-98.5  F (36.9  C)] 98.5  F (36.9  C)  Pulse:  [80-90] 82  Resp:  [20-24] 24  BP: (119-153)/(60-81) 119/60  FiO2 (%):  [21 %] 21 %  SpO2:  [92 %-98 %] 96 %   EMOTIONAL CONCERNS -- None   RISK FOR SELF-DECANNULATION -- Yes; Mitt Restraints     NOTE   Wore PMV all day inline with HTD from 0719 to 1945. No signs of respiratory distress. RT will continue to monitor.     Kayla Small, RT on 8/18/2021 at 10:51 PM

## 2021-08-19 NOTE — PLAN OF CARE
Problem: Restraint for Non-Violent/Non-Self-Destructive Behavior  Goal: Prevent/Manage Potential Problems  Description: Maintain safety of patient and others during period of restraint.  Promote psychological and physical wellbeing.  Prevent injury to skin and involved body parts.  Promote nutrition, hydration, and elimination.  Outcome: No Change     Problem: Anxiety  Goal: Anxiety Reduction or Resolution  Outcome: Improving   Pt alert pleasant coop with cares, cont with bilateral mitts for safety, will cont to assess for restraint reduction, bowel sounds positive had med soft stool today

## 2021-08-19 NOTE — PROGRESS NOTES
[facility] Palliative Care Social Work Note:    Attempted to call wife Susy to offer support. Left voicemail with contact info and invited her to call if needed.     PC SW remains available if needs arise.    Josephine Dailey, [facility]  Palliative Care   Office # [phone]

## 2021-08-19 NOTE — PROGRESS NOTES
RENAL PROGRESS NOTE    Following peripherally today. Good UOP on Lasix 20 mg BID. Check BMP tomorrow. Will see tomorrow. Call if any concerns in the meantime.     Elpidio Rowley PA-C  [facility]  [phone]

## 2021-08-19 NOTE — PROGRESS NOTES
NUTRITION BRIEF NOTE:   See RD note 8/16/21 for full reassessment     New findings in last 24 hours:    Diet order: NPO    CONTINUOUS, Starting on Mon 8/16/21 at 1330, Until Specified  Adult Formula: Lily Farms Peptide 1.5  Route: Gastrostomy  Goal Rate: 80  Goal Units: mL/hr  Medication - Feeding Tube Flush Frequency: At least 15-30 mL water before and after medication administration and with tube clogging  Amount to Send (Nutrition use): 6 cans/day  Advancement comment: Start at new goal rate 80 mL/hr ( rate was decreased while pt on d5 IVF)      Remains NPO and TF reliant     Enteral:  :Via PEG tube which was placed 6/7/21    Meeting estimated needs since admit    I/O last 3 completed shifts:    In: 2734 [NG/GT:2734]    Out: 1900 [Urine:1900]    Labs: reviewed :    Electrolytes  Potassium (mmol/L)   Date Value   08/18/2021 4.5   08/17/2021 4.1   08/16/2021 4.4   06/11/2021 3.8   06/10/2021 3.7   06/09/2021 3.8     Phosphorus (mg/dL)   Date Value   08/13/2021 4.2   08/11/2021 4.3   06/11/2021 2.9   06/10/2021 2.5   06/09/2021 2.5   06/08/2021 2.3 (L)   06/07/2021 2.9        Blood Glucose  Glucose (mg/dL)   Date Value   08/18/2021 119   08/17/2021 110   08/16/2021 126 (H)   08/13/2021 110   08/11/2021 115   06/11/2021 142 (H)   06/10/2021 134 (H)   06/09/2021 117 (H)   06/08/2021 125 (H)   06/07/2021 103 (H)     Hemoglobin A1C (%)   Date Value   05/15/2021 5.5        Inflammatory Markers  CRP Inflammation (mg/L)   Date Value   06/01/2021 79.0 (H)   05/30/2021 120.0 (H)   05/28/2021 150.0 (H)     WBC (10e9/L)   Date Value   06/11/2021 8.2   06/10/2021 9.4   06/09/2021 10.4     WBC Count (10e3/uL)   Date Value   08/16/2021 6.7   08/09/2021 8.0   08/04/2021 9.3     Albumin (g/dL)   Date Value   08/13/2021 2.9 (L)   08/11/2021 2.9 (L)   06/14/2021 2.1 (L)   06/02/2021 1.7 (L)   05/26/2021 2.1 (L)   05/23/2021 1.9 (L)        Magnesium (mg/dL)   Date Value   08/16/2021 2.0   06/11/2021 2.5 (H)   06/10/2021 2.4 (H)  "  06/09/2021 2.4 (H)     Sodium (mmol/L)   Date Value   08/18/2021 142   08/17/2021 145   08/16/2021 150 (H)   06/11/2021 143   06/10/2021 142   06/09/2021 143        Renal  Urea Nitrogen (mg/dL)   Date Value   08/18/2021 33 (H)   08/17/2021 32 (H)   08/16/2021 35 (H)   06/11/2021 25   06/10/2021 23   06/09/2021 24     Creatinine (mg/dL)   Date Value   08/18/2021 0.81   08/17/2021 0.77   08/16/2021 0.77   06/11/2021 0.57 (L)   06/10/2021 0.52 (L)   06/09/2021 0.49 (L)         Additional  Triglycerides (mg/dL)   Date Value   05/28/2021 91   06/05/2013 178 (H)   09/19/2007 109     Ketones Urine   Date Value   07/01/2021 Negative   06/01/2021 Negative mg/dL            acetylcysteine  2 mL Nebulization BID     famotidine  20 mg Oral or Feeding Tube BID     furosemide  20 mg Oral or Feeding Tube BID     heparin ANTICOAGULANT  5,000 Units Subcutaneous Q8H     ipratropium - albuterol 0.5 mg/2.5 mg/3 mL  3 mL Nebulization BID     lisinopril  40 mg Oral or Feeding Tube Daily     menthol-zinc oxide   Topical TID     methylphenidate  10 mg Oral or Feeding Tube QAM AC     mirtazapine  7.5 mg Per Feeding Tube At Bedtime     scopolamine  1 patch Transdermal Q72H    And     scopolamine   Transdermal Q8H     sodium chloride  3 mL Nebulization BID     venlafaxine  50 mg Per Feeding Tube TID w/meals          dextrose       sodium chloride              ANTHROPOMETRICS  Height: 5' 10.984\"  Weight: 69 kg   Body mass index is 21.38 kg/m .  Weight Status:  Normal BMI    Weight History:   Wt Readings from Last 10 Encounters:   08/19/21 69.5 kg (153 lb 3.2 oz)   06/11/21 68.7 kg (151 lb 7.3 oz)   05/15/21 74 kg (163 lb 2.3 oz)   09/23/13 76 kg (167 lb 8 oz)   06/05/13 78.1 kg (172 lb 3.2 oz)   12/28/07 78.9 kg (174 lb)   12/05/07 81.9 kg (180 lb 9.6 oz)   10/10/07 85.6 kg (188 lb 11.2 oz)   09/19/07 84.8 kg (187 lb)       Estimated Nutrition Needs:  Assessment weight is 63 kg, most accurate weight (as of 6/23) (81% IBW of 172 lb)     Energy " Needs: 0124-1109 kcals daily, 45-50 kcal/kg (updated 6/28 since pt only maintaining wt at 40-45 kcal/kg)  Protein Needs: + mg daily, 1.5-2+ g/kg   Fluid Needs: 4724-6962 mls daily, 25-30 mls/kg vs 3095-6973 ml/kcal    Interventions:    Enteral order changes:: increased goal rate  Continuous back to 90 ml/h now that d5 IVF d/thang   Free Water Flushes: 180 mL q4 hours    Enteral at goal  Provides the following to meet estimated needs :   2160 mL daily provides 3240 kcal, 160g protein, 260g CHO, 17g fiber and 1512 mL free water from formula + 1080 ml from flushes =2592 ml/d. Meets 100% of DRI's (Total Ca+ = 2016 mg)

## 2021-08-19 NOTE — PROGRESS NOTES
Social Work Note:  Patient chart reviewed.  Patient discussed in morning rounds.  Barriers to discharge: frequent suction x6.  Bi-lat mittens.  Phase 2, HTD 21%/20LPM     Patient will need placement after discharge from LTACH.  Patient can not discharge to SNF/TCU with mitts x2 in place. Patient suctioning will need to be no more than 3-5 a shift to discharge to a SNF/TCU.    Patient's exact discharge date, time, disposition TBD.  SW will continue to follow for psychosocial and emotional support of patient and family. SW to facilitate discharge to SNF/TCU when pt no longer requires LTACH level of care.         Dayron Chen, [facility] Hospital[facility]  [facility]  [phone]

## 2021-08-19 NOTE — PLAN OF CARE
Problem: Communication Impairment (Artificial Airway)  Goal: Effective Communication  Outcome: Improving     Problem: Device-Related Complication Risk (Artificial Airway)  Goal: Optimal Device Function  Outcome: Improving     Problem: Skin and Tissue Injury (Artificial Airway)  Goal: Absence of Device-Related Skin or Tissue Injury  Outcome: Improving   RT PROGRESS NOTE     DATA:     CURRENT SETTINGS:               TRACH TYPE / SIZE: #6 BIVONA TTS Changed on 7/28/21             MODE: TM             FIO2:  21%@20 L/MIN     ACTION:             THERAPIES: ,DuoNeb/ Mucomyst /Sodium Chloride neb BID, MetaNeb BID                SUCTION:                           FREQUENCY: x3                        AMOUNT: Small to large                        CONSISTENCY: thick/thin                        COLOR:  White             SPONTANEOUS COUGH EFFORT/STRENGTH OF EFFORT (not elicited by suctioning): Fair                               WEANING PHASE: 2                        WEAN MODE: PMV days as reji                          WEAN TIME:  SINCE 07:18 tolerating                        END WEAN REASON: ongoing      RESPONSE:             BS: Diminished coarse                VITAL SIGNS: Sat 95-96, HR 78-85, RR 22-24             EMOTIONAL NEEDS / CONCERNS:confused               RISK FOR SELF DECANNULATION: yes                        RISK DUE TO:  Confused                         INTERVENTION/S IN PLACE IS/ARE: Bilateral  hand  Mittens      NOTE / PLAN:  Cont. Current plan of care

## 2021-08-19 NOTE — PLAN OF CARE
Problem: Restraint for Non-Violent/Non-Self-Destructive Behavior  Goal: Prevent/Manage Potential Problems  Description: Maintain safety of patient and others during period of restraint.  Promote psychological and physical wellbeing.  Prevent injury to skin and involved body parts.  Promote nutrition, hydration, and elimination.  Outcome: No Change     Problem: Skin and Tissue Injury (Artificial Airway)  Goal: Absence of Device-Related Skin or Tissue Injury  Outcome: No Change     Problem: Adult Inpatient Plan of Care  Goal: Absence of Hospital-Acquired Illness or Injury  Intervention: Identify and Manage Fall Risk  Recent Flowsheet Documentation  Taken 8/19/2021 0100 by Kristal Jaramillo, RN  Safety Promotion/Fall Prevention:   bed alarm on   room door open   room near nurse's station   safety round/check completed       Slept 5-6 hours the whole night, no signs of pain or  discomfort. Bilateral mitts in place for safety, monitored.

## 2021-08-19 NOTE — PROGRESS NOTES
[facility]    Medicine Progress Note - Hospitalist Service       Date of Admission:  6/11/2021    Assessment & Plan         Summary:     Dayron Neri is a 70 year male with h/o HTN who presented to ED on 5/15/2021 with acute SAH after a fall with unresponsiveness.  He underwent emergent left external ventricular drain placement.  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  On 5/28/2021, patient had persistent cerebral vasospasm, CT head showed bilateral BAYLEE infarct and was treated with milrinone and verapamil.  On 6/1/2021, angiogram showed no evidence of vasospasm.  Extubated on 6/3/2021.  6/6/2021 he was reintubated due to hypoxia.  On 6/7/2021, patient underwent tracheostomy and PEG tube placement.  EVD removed 6/9/2021.   Patient was treated for possible pneumonia with Unasyn on 5/20/2021, It was switched to ceftriaxone the next day for sputum culture growing Klebsiella. On 5/23, sputum culture also grew Pseudomonas. Antibiotic was switched to Zosyn.  Due to increased WBCs in CSF, possibility of ventriculitis was raised. CSF culture was negative. Antibiotic was switched to cefepime and vancomycin on 5/28/2021 for 2 weeks.  Vanco was discontinued on 6/9/2021.  On 6/5/2021, sputum grew ESBL so cefepime was switched to meropenem. He was transferred to [facility] on 6/11/21.      Repeat sputum culture 6/19 showed MDR pseudomonas and he was started on tobramycin nebs from 6/24/2021 - 7/9/2021 .  7/8/2021 aspirated tube feeds.  Spiked a fever that evening 102.8 and WBC went up to 36.9 the next morning so ID reconsulted and was started on ceftazadime-avibactam along with flagyl 7/9/2021 - 7/18/21. He has had repeated bouts of Klebsiella infection in sputum and ventilator associated pneumonia.  Additionally he has had a large amount of tracheal secretions, significant cognitive impairment, left-sided neglect and ongoing episodes of  agitation.  Patient's family has been monitoring the patient continuously during the daytime hours via video monitor and visit with him in person over the weekends.     8/19 :   Clinically stable  Respiratory status - down to FiO2 21-26%, and 20 liters  On Phase 2 wean~continous heated trach mask with hi rosemarie 30% Fi02 tolerated PMV days.    On Duo-neb, four times a day sodium chloride neb two times a day for  pulmonary hygiene.    Still having significant secretions, Frequent suctioning 4-6 x/shift.  Calcium slightly improved after D5W infusion. Hypernatremia resolved. Normal saline flushes changed to free water flushes   Barriers to discharge : still needing high flow oxygen, frequent suctioning and needing mitt restraints      Assessment & Plan    Acute on chronic hypoxemic respiratory failure:   S/p treatment pseudomonas ESBL and klebsiella pneumonia.           - excessive thick white secretions is the main barrier to decannulation           - glycopyrolate was d/thang 2 weeks back per pulmonary medicine          - RT reporting continued copious thick white secretions with frequent suctioning   - Speaking valve is being trialed during the day and patient is tolerating. PVR off with cuff inflated at night. Weaning has been slow and the biggest barrier for decannulation is excessive secretions  - Continue to wean per RT and pulmonary.   - Continue  duonebs, acetylcysteine, and 3% saline nebs   - Continue Scopolamine (started 7/20/2021)      Ventilator associated pneumonia: Completed antibiotics on July 18.     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 sputum culture 6/19 showed MDR pseudomonas. completed Tobramycin nebs 6/24/2021 - 7/9/2021.   - Continue pulmonary toilet, scheduled duonebs     Traumatic brain injury/intracranial hemorrage: 6/14/21 CT head done for fatigue and read as slight increase in caliber of lateral and third ventricle with  "possibility of developing communicating hydrocephalus. Discussed with Dr. Casillas (neurosurgeon at [facility]), who did not think there was much change. CT head repeated on 6/16/21, which did not show any change.  - Need follow up with Dr. Martinez with Neurosurgery in 3 months with repeat CTA of head and neck for post-op f/u.     Dysphagia:   -Patient failed the blue dye test 7/20/21 with immediate aspiration, continue to use tube feeds.  Did pass the blue dye test a week later but still high risk for aspiration.    - Speech therapy following. See note from 8/17/21. Per speech therapist, despite several weeks of working with patient there has been \"no functional improvement in patient's ability to manage his oropharyngeal secretions.\" Speech discussed this with wife. Intensive dysphagia treatment has been discontinued and speech will indirectly follow patient   - Still having significant secretions, keep NPO due to concerns with aspiration.      Acute metabolic encephalopathy: improved.   - Continue Ritalin and effexor  - Patient on Effexor for agitation, increased on 8/18 to 50 mg three times daily      Hypercalcemia :              - 8/18 Ca+ 10.6, unchanged from previous. Evaluated by nephrology             - PTH 26 on 8/11/2021, vitamin D level low at 25, Has been on Ritalin since mid July calcium was  already mildly elevated before that, no other supplements noted. Possibly secondary to  immobilization prolonged hospitalization, PTH low normal and not suppressed in the setting of  hypercalcemia. No history suggestive of any lymphoma or lymphadenopathy or any granulomatosis  related disorders to suggest any additional etiology.  - Dietician changed tube feeds to contain 800 mg less calcium compared to previous formula Calcium has been high normal since early July 1 noted 7/5/2021 @ 10.6, ionized calcium 1.4, Progressively worsening to 11s, with ionized calcium of 1.4-1.45  --  improved some today after D5W with lasix IV.  " Nephrology recommending lasix by FT and back to 200 mL flushes every 4 hours in FT.  - Weekly labs on every Monday            Hypernatremia   - Na+ 150 on 8/16/21, Normal saline flushes via tube feed stopped. Na+ 142 yesterday      - Tried D5W 100 ml/hr with IV lasix which was stopped by nephrology. Continue free water flushes with lasix 20 mg via g-tube twice daily     Malnutrition:    - Level of malnutrition: Severe   - Based on: moderate/severe subcutaneous fat loss, moderate/severe muscle loss      Anemia: hemoglobin stable. Continue to monitor.      Essential hypertension: BP controlled. On lisinopril     Severe debility, weakness, critical illness, deconditioning, Continue PT/OT     Insomnia: is on Ritalin which was decreased to once daily due to insomnia when taken later in day.    - Continue nighttime mirtazapine 7.5 mg scheduled.     Diet: Adult Formula Drip Feeding: Continuous BAROnova Peptide 1.5; Gastrostomy; Goal Rate: 80; mL/hr; Medication - Feeding Tube Flush Frequency: At least 15-30 mL water before and after medication administration and with tube clogging; Amount to Send ...    DVT Prophylaxis: Heparin SQ  Gutierrez Catheter: Not present  Central Lines: None  Code Status: Full Code       Disposition Plan   Expected discharge: to be determined         Diet: Adult Formula Drip Feeding: Continuous BAROnova Peptide 1.5; Gastrostomy; Goal Rate: 80; mL/hr; Medication - Feeding Tube Flush Frequency: At least 15-30 mL water before and after medication administration and with tube clogging; Amount to Send ...    DVT Prophylaxis: Heparin SQ  Gutierrez Catheter: Not present  Central Lines: None  Code Status: Full Code      Total floor/unit time spent was 25 minutes in reviewing the record, medications, lab results and completing documentation. 15 minutes spent in counseling and discussion with patient regarding diagnosis, medications and treatment plan. Care discussed and coordinated with patient and nurse.  "    [facility]  Hospitalist Service  [facility]  Securely message with the Vocera Web Console (learn more here)  Text page via [employer] Paging/Directory                 ______________________________________________________________________    Interval History   Not much change today. Patient lying in bed with nursing assistant present. Tolerating hygiene cares with some mild agitation with repositioning. Mitts on bilateral hands. PSV in place. Speech a bit more clear today but at times patient whispers or mouths words and is difficult to understand. \"I'm doing ok today.\" Patient says \"no\" when I ask if he is having pain. No concerns per nursing staff. Had moderate bowel movement yesterday and a small bowel movement this morning. Previously staff was reporting no bowel movement x 6 days. He was given milk of magnesia, biscodyl suppository and miralax with results.     Review of Systems: 10 point review of systems negative except for pertinent positives mentioned in HPI      Data reviewed today: I reviewed all medications, new labs and imaging results over the last 24 hours. I personally reviewed no images or EKG's today.    Physical Exam   Vital Signs: Temp: 98  F (36.7  C) Temp src: Axillary BP: (!) 142/69 Pulse: 85   Resp: 22 SpO2: 95 % O2 Device: Trach dome Oxygen Delivery: 20 LPM     Weight: 153 lbs 3.2 oz  General Appearance:  Alert, calm, no apparent distress   Respiratory: coarse anterior breath sounds, no wheezing or crackles, tracheostomy patent with no visible secretions, PMV in place, surrounding skin around trach dry/intact, no bleeding around trach site   Cardiovascular: S1,S2, rhythm rate regular, negative murmur   GI: Bowel sounds positive x 4, non distended non tender, G/J tube appears patent with tube feeding infusing, surrounding skin dry/intact  Skin: pink dry and intact  Neurological: alert and oriented x 1, whispers/mouths words when speaking valve in,  partially understandable, moves upper " extremities, able to lift right arm all the way up, moving left arm up but weaker compared to right with chronic left sided neglet, inconsistent following commands     Data   Recent Labs   Lab 08/18/21  0824 08/17/21  0626 08/16/21  0649 08/13/21  0604   WBC  --   --  6.7  --    HGB  --   --  9.6*  --    MCV  --   --  97  --    PLT  --   --  377  --     145 150* 141   POTASSIUM 4.5 4.1 4.4 4.5   CHLORIDE 102 106 113* 103   CO2 28 29 28 28   BUN 33* 32* 35* 46*   CR 0.81 0.77 0.77 0.87   ANIONGAP 12 10 9 10   RAGINI 10.6* 10.6* 10.7* 11.0*    110 126* 110   ALBUMIN  --   --   --  2.9*     No results found for this or any previous visit (from the past 24 hour(s)).  Medications     dextrose       sodium chloride         acetylcysteine  2 mL Nebulization BID     famotidine  20 mg Oral or Feeding Tube BID     furosemide  20 mg Oral or Feeding Tube BID     heparin ANTICOAGULANT  5,000 Units Subcutaneous Q8H     ipratropium - albuterol 0.5 mg/2.5 mg/3 mL  3 mL Nebulization BID     lisinopril  40 mg Oral or Feeding Tube Daily     menthol-zinc oxide   Topical TID     methylphenidate  10 mg Oral or Feeding Tube QAM AC     mirtazapine  7.5 mg Per Feeding Tube At Bedtime     scopolamine  1 patch Transdermal Q72H    And     scopolamine   Transdermal Q8H     sodium chloride  3 mL Nebulization BID     venlafaxine  50 mg Per Feeding Tube TID w/meals     Restraint:     Patient pulls his IV line and trach.      Within an hour after restraint an in person face to face assessment was completed at 8:00 AM, including an evaluation of the patient's immediate reaction to the intervention, behavioral assessment and review/assessment of history, drugs and medications, recent labs, etc., and behavioral condition.  The patient experienced no adverse physical outcome from seclusion/restraint initiation.  The intervention of restraint or seclusion needs to continue.

## 2021-08-20 ENCOUNTER — APPOINTMENT (OUTPATIENT)
Dept: PHYSICAL THERAPY | Facility: CLINIC | Age: 71
DRG: 207 | End: 2021-08-20
Attending: HOSPITALIST
Payer: COMMERCIAL

## 2021-08-20 ENCOUNTER — APPOINTMENT (OUTPATIENT)
Dept: SPEECH THERAPY | Facility: CLINIC | Age: 71
DRG: 207 | End: 2021-08-20
Attending: HOSPITALIST
Payer: COMMERCIAL

## 2021-08-20 LAB
ANION GAP SERPL CALCULATED.3IONS-SCNC: 12 MMOL/L (ref 5–18)
BUN SERPL-MCNC: 39 MG/DL (ref 8–22)
CALCIUM SERPL-MCNC: 10.8 MG/DL (ref 8.5–10.5)
CHLORIDE BLD-SCNC: 104 MMOL/L (ref 98–107)
CO2 SERPL-SCNC: 28 MMOL/L (ref 22–31)
CREAT SERPL-MCNC: 0.88 MG/DL (ref 0.7–1.3)
GFR SERPL CREATININE-BSD FRML MDRD: 88 ML/MIN/1.73M2
GLUCOSE BLD-MCNC: 120 MG/DL (ref 70–125)
POTASSIUM BLD-SCNC: 4.6 MMOL/L (ref 3.5–5)
SODIUM SERPL-SCNC: 144 MMOL/L (ref 136–145)

## 2021-08-20 PROCEDURE — 999N000009 HC STATISTIC AIRWAY CARE

## 2021-08-20 PROCEDURE — 94640 AIRWAY INHALATION TREATMENT: CPT

## 2021-08-20 PROCEDURE — 250N000009 HC RX 250: Performed by: INTERNAL MEDICINE

## 2021-08-20 PROCEDURE — 250N000013 HC RX MED GY IP 250 OP 250 PS 637: Performed by: NURSE PRACTITIONER

## 2021-08-20 PROCEDURE — 94668 MNPJ CHEST WALL SBSQ: CPT

## 2021-08-20 PROCEDURE — 250N000011 HC RX IP 250 OP 636

## 2021-08-20 PROCEDURE — 250N000013 HC RX MED GY IP 250 OP 250 PS 637

## 2021-08-20 PROCEDURE — 250N000013 HC RX MED GY IP 250 OP 250 PS 637: Performed by: INTERNAL MEDICINE

## 2021-08-20 PROCEDURE — 250N000013 HC RX MED GY IP 250 OP 250 PS 637: Performed by: HOSPITALIST

## 2021-08-20 PROCEDURE — 92507 TX SP LANG VOICE COMM INDIV: CPT | Mod: GN

## 2021-08-20 PROCEDURE — 97110 THERAPEUTIC EXERCISES: CPT | Mod: GP

## 2021-08-20 PROCEDURE — 120N000017 HC R&B RESPIRATORY CARE

## 2021-08-20 PROCEDURE — 999N000157 HC STATISTIC RCP TIME EA 10 MIN

## 2021-08-20 PROCEDURE — 250N000013 HC RX MED GY IP 250 OP 250 PS 637: Performed by: PHYSICIAN ASSISTANT

## 2021-08-20 PROCEDURE — 94660 CPAP INITIATION&MGMT: CPT

## 2021-08-20 PROCEDURE — 99232 SBSQ HOSP IP/OBS MODERATE 35: CPT | Performed by: PHYSICIAN ASSISTANT

## 2021-08-20 PROCEDURE — 97112 NEUROMUSCULAR REEDUCATION: CPT | Mod: GP

## 2021-08-20 PROCEDURE — 82310 ASSAY OF CALCIUM: CPT | Performed by: PHYSICIAN ASSISTANT

## 2021-08-20 PROCEDURE — 36415 COLL VENOUS BLD VENIPUNCTURE: CPT | Performed by: PHYSICIAN ASSISTANT

## 2021-08-20 PROCEDURE — 272N000270 HC CIRCUIT, METANEB

## 2021-08-20 PROCEDURE — 94640 AIRWAY INHALATION TREATMENT: CPT | Mod: 76

## 2021-08-20 RX ORDER — POLYETHYLENE GLYCOL 3350 17 G/17G
17 POWDER, FOR SOLUTION ORAL DAILY PRN
Status: DISCONTINUED | OUTPATIENT
Start: 2021-08-20 | End: 2021-08-20

## 2021-08-20 RX ORDER — FUROSEMIDE 20 MG
20 TABLET ORAL DAILY
Status: DISCONTINUED | OUTPATIENT
Start: 2021-08-20 | End: 2021-08-23

## 2021-08-20 RX ORDER — FUROSEMIDE 20 MG
40 TABLET ORAL DAILY
Status: DISCONTINUED | OUTPATIENT
Start: 2021-08-21 | End: 2021-08-23

## 2021-08-20 RX ADMIN — LISINOPRIL 40 MG: 20 TABLET ORAL at 21:06

## 2021-08-20 RX ADMIN — HEPARIN SODIUM 5000 UNITS: 5000 INJECTION, SOLUTION INTRAVENOUS; SUBCUTANEOUS at 06:22

## 2021-08-20 RX ADMIN — ACETYLCYSTEINE 2 ML: 200 SOLUTION ORAL; RESPIRATORY (INHALATION) at 07:47

## 2021-08-20 RX ADMIN — METHYLPHENIDATE HYDROCHLORIDE 10 MG: 10 TABLET ORAL at 06:22

## 2021-08-20 RX ADMIN — SODIUM CHLORIDE 30 MG/ML INHALATION SOLUTION 3 ML: 30 SOLUTION INHALANT at 07:47

## 2021-08-20 RX ADMIN — IPRATROPIUM BROMIDE AND ALBUTEROL SULFATE 3 ML: 2.5; .5 SOLUTION RESPIRATORY (INHALATION) at 20:34

## 2021-08-20 RX ADMIN — HEPARIN SODIUM 5000 UNITS: 5000 INJECTION, SOLUTION INTRAVENOUS; SUBCUTANEOUS at 14:06

## 2021-08-20 RX ADMIN — IPRATROPIUM BROMIDE AND ALBUTEROL SULFATE 3 ML: 2.5; .5 SOLUTION RESPIRATORY (INHALATION) at 07:47

## 2021-08-20 RX ADMIN — MIRTAZAPINE 7.5 MG: 7.5 TABLET ORAL at 21:06

## 2021-08-20 RX ADMIN — VENLAFAXINE 50 MG: 25 TABLET ORAL at 16:12

## 2021-08-20 RX ADMIN — SODIUM CHLORIDE 30 MG/ML INHALATION SOLUTION 3 ML: 30 SOLUTION INHALANT at 20:34

## 2021-08-20 RX ADMIN — VENLAFAXINE 50 MG: 25 TABLET ORAL at 09:03

## 2021-08-20 RX ADMIN — ANORECTAL OINTMENT: 15.7; .44; 24; 20.6 OINTMENT TOPICAL at 14:07

## 2021-08-20 RX ADMIN — ACETYLCYSTEINE 2 ML: 200 SOLUTION ORAL; RESPIRATORY (INHALATION) at 20:34

## 2021-08-20 RX ADMIN — FAMOTIDINE 20 MG: 20 TABLET, FILM COATED ORAL at 21:06

## 2021-08-20 RX ADMIN — HEPARIN SODIUM 5000 UNITS: 5000 INJECTION, SOLUTION INTRAVENOUS; SUBCUTANEOUS at 21:19

## 2021-08-20 RX ADMIN — FAMOTIDINE 20 MG: 20 TABLET, FILM COATED ORAL at 09:03

## 2021-08-20 RX ADMIN — ANORECTAL OINTMENT: 15.7; .44; 24; 20.6 OINTMENT TOPICAL at 09:08

## 2021-08-20 RX ADMIN — ANORECTAL OINTMENT: 15.7; .44; 24; 20.6 OINTMENT TOPICAL at 21:06

## 2021-08-20 RX ADMIN — VENLAFAXINE 50 MG: 25 TABLET ORAL at 12:08

## 2021-08-20 RX ADMIN — FUROSEMIDE 20 MG: 20 TABLET ORAL at 06:22

## 2021-08-20 RX ADMIN — ACETAMINOPHEN ORAL SOLUTION 650 MG: 650 SOLUTION ORAL at 09:16

## 2021-08-20 RX ADMIN — FUROSEMIDE 20 MG: 20 TABLET ORAL at 14:06

## 2021-08-20 ASSESSMENT — MIFFLIN-ST. JEOR: SCORE: 1487.69

## 2021-08-20 NOTE — PROGRESS NOTES
RENAL PROGRESS NOTE    CC:  Hypercalcemia     ASSESSMENT & PLAN:   70 year old male with past medical history of hypertension, admitted with subarachnoid hemorrhage after a fall on 5/15/2021 s/p emergent left external ventricular drain placement with posterior communicating artery aneurysm defied structured underwent craniotomy with clipping of aneurysm , complicated by persistent cerebral vasospasms and bilateral BAYLEE infarct.  Course also complicated by respiratory failure with intubation pneumonia was treated with cefepime and Vanco, transferred to LT[facility] on 6/11/2021.  Nephrology consulted for hypercalcemia on 8/15/2021     Hypercalcemia  Likely CKD III based on 24 hour cr clearance   Calcium has been high normal since early July 1 noted 7/5/2021 @ 10.6, ionized calcium 1.4  Was progressively worsening to 11s, with ionized calcium of 1.4-1.45  PTH 26 on 8/11/2021, vitamin D level 30 8/15/2021  Has been on Ritalin since mid July, calcium was already mildly elevated before that, no other supplements noted  Likely secondary to immobilization/ prolonged hospitalization, CKD  -PTH 26 likely 2/2 so some underlying CKD   No history suggestive of any lymphoma or lymphadenopathy or any granulomatosis related disorders to suggest any additional etiology.  --Tube feeds were adjusted 8/15 to lower calcium content   -- Started on  ml every 2 hours enterally on 8/14/2021, then serum sodium 150 8/16/2021, stopped NS flushes in tube feeds 8/16/2021, started D5W at 100 ml/hr and dosed IV Lasix 20 mg. Sodium improved to normal and ca slowly improving    --Stopped D5W on 8/17/21. Started free water flushes 200cc q4h.   --Started po Lasix 20 mg BID down feeding tube 8/17/21. Increased po Lasix to 40 mg am and 20 mg afternoon 8/20/21   -- Check BMP 8/23/21  --Wiil likely follow peripherally over the weekend      Creatinine based GFR of 89-90, creatinine 0.88  24 hour cr clearance estimates GFR at 45 indicating stage 3  CKD       Hypertension lisinopril 40 mg  euvolemic   Likely elevated insensible losses with vent and tachypnea  --Blood pressure stable      Anemia  Inflammatory  Mild management per hospitalist medicine     Acute on chronic hypoxemic respiratory failure  S/p trach  ESBL Pseudomonas and Klebsiella pneumonia  Had tobramycin nebs for MDR Pseudomonas  Pulmonary following  Currently off antibiotics     Malnutrition on tube feed     SAH w fall sp craniotomy and hematoma evacuation , PCA aneurysm clip  cb BAYLEE stroke ad vasopasm  No on rehab     Insomnia/mood disorders  On Ritalin and mirtazapine  Management per primary medicine     Metabolic encephalopathy stable    SUBJECTIVE:   Sodium improved   Free water flushes 200 ml q4h  Increase po Lasix 40 mg AM and 20 mg afternoon down feeding tube  24 hour creatinine clearance study estimates GFR to be 45  Plan to repeat basic metabolic panel on 8/30/2021  Will likely not see patient over the weekend and follow peripherally     OBJECTIVE:  Physical Exam   Temp: 97.5  F (36.4  C) Temp src: Axillary BP: 133/81 Pulse: 90   Resp: 20 SpO2: 96 % O2 Device: Trach dome Oxygen Delivery: 20 LPM  Vitals:    08/18/21 0545 08/19/21 0600 08/20/21 0600   Weight: 70.2 kg (154 lb 12.8 oz) 69.5 kg (153 lb 3.2 oz) 69.6 kg (153 lb 6.4 oz)     Vital Signs with Ranges  Temp:  [97.5  F (36.4  C)-98.4  F (36.9  C)] 97.5  F (36.4  C)  Pulse:  [81-99] 90  Resp:  [20-22] 20  BP: (117-151)/(75-93) 133/81  FiO2 (%):  [21 %] 21 %  SpO2:  [92 %-100 %] 96 %  I/O last 3 completed shifts:  In: 1452 [NG/GT:1452]  Out: 700 [Urine:700]      Patient Vitals for the past 72 hrs:   Weight   08/20/21 0600 69.6 kg (153 lb 6.4 oz)   08/19/21 0600 69.5 kg (153 lb 3.2 oz)   08/18/21 0545 70.2 kg (154 lb 12.8 oz)       Intake/Output Summary (Last 24 hours) at 8/16/2021 1118  Last data filed at 8/16/2021 1000  Gross per 24 hour   Intake 4214 ml   Output 1150 ml   Net 3064 ml       PHYSICAL EXAM:  General: Awake, NAD  HENT:  Supple, Non-tender, no obvious JVD  Eyes: No scleral icterus  Cardiovascular: RRR, no rub, gallop, or murmur. No-trace LE edema.  Respiratory: Non-labored, trach +  Gastrointestinal: Soft, non-tender, non-distended  Integumentary: Warm, dry, no rash  Neurologic: Grossly intact     LABORATORY STUDIES:     Recent Labs   Lab 08/16/21  0649   WBC 6.7   RBC 3.03*   HGB 9.6*   HCT 29.5*          Basic Metabolic Panel:  Recent Labs   Lab 08/20/21  0611 08/18/21  0824 08/17/21  0626 08/16/21  0649 08/15/21  0716 08/14/21  0704    142 145 150*  --   --    POTASSIUM 4.6 4.5 4.1 4.4  --   --    CHLORIDE 104 102 106 113*  --   --    CO2 28 28 29 28  --   --    BUN 39* 33* 32* 35*  --   --    CR 0.88 0.81 0.77 0.77  --   --     119 110 126*  --   --    RAGINI 10.8* 10.6* 10.6* 10.7* 10.7* 11.2*       INRNo lab results found in last 7 days.     Recent Labs   Lab Test 08/16/21  0649 08/09/21  0615 05/16/21  0405 05/15/21  2125 05/15/21  1222   INR  --   --   --  1.20* 1.16*   WBC 6.7 8.0   < > 12.3* 16.5*   HGB 9.6* 10.7*   < > 11.8* 13.6    289   < > 239 257    < > = values in this interval not displayed.       Personally reviewed current labs    Elpidio Rowley PA-C  [facility]  [phone]

## 2021-08-20 NOTE — PROGRESS NOTES
[facility]    Medicine Progress Note - Hospitalist Service       Date of Admission:  6/11/2021    Assessment & Plan             Summary:     Dayron Neri is a 70 year male with h/o HTN who presented to ED on 5/15/2021 with acute SAH after a fall with unresponsiveness.  He underwent emergent left external ventricular drain placement.  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  On 5/28/2021, patient had persistent cerebral vasospasm, CT head showed bilateral BAYLEE infarct and was treated with milrinone and verapamil.  On 6/1/2021, angiogram showed no evidence of vasospasm.  Extubated on 6/3/2021.  6/6/2021 he was reintubated due to hypoxia.  On 6/7/2021, patient underwent tracheostomy and PEG tube placement.  EVD removed 6/9/2021.   Patient was treated for possible pneumonia with Unasyn on 5/20/2021, It was switched to ceftriaxone the next day for sputum culture growing Klebsiella. On 5/23, sputum culture also grew Pseudomonas. Antibiotic was switched to Zosyn.  Due to increased WBCs in CSF, possibility of ventriculitis was raised. CSF culture was negative. Antibiotic was switched to cefepime and vancomycin on 5/28/2021 for 2 weeks.  Vanco was discontinued on 6/9/2021.  On 6/5/2021, sputum grew ESBL so cefepime was switched to meropenem. He was transferred to [facility] on 6/11/21.      Repeat sputum culture 6/19 showed MDR pseudomonas and he was started on tobramycin nebs from 6/24/2021 - 7/9/2021 .  7/8/2021 aspirated tube feeds.  Spiked a fever that evening 102.8 and WBC went up to 36.9 the next morning so ID reconsulted and was started on ceftazadime-avibactam along with flagyl 7/9/2021 - 7/18/21. He has had repeated bouts of Klebsiella infection in sputum and ventilator associated pneumonia.  Additionally he has had a large amount of tracheal secretions, significant cognitive impairment, left-sided neglect and ongoing episodes of  agitation.  Patient's family has been monitoring the patient continuously during the daytime hours via video monitor and visit with him in person over the weekends.     8/19 :   Clinically stable  Respiratory status - down to FiO2 21-26%, and 20 liters  On Phase 2 wean~continous heated trach mask with hi rosemarie 30% Fi02 tolerating  PMV during the day.    On Duo-neb, four times a day sodium chloride neb two times a day for  pulmonary hygiene.    Still having significant secretions, Frequent suctioning 4-6 x/shift.  Calcium remains slightly elevated. Received D5W infusion last week. Hypernatremia resolved. Normal saline flushes changed to free water flushes   Barriers to discharge : still needing high flow oxygen, frequent suctioning and needing mitt restraints      Assessment & Plan   Addendum 8/20/2021 3:37 PM: At approximately 1:00 PM I spoke to patient's wife Susy via iPad in the room.  Previous to this the speech therapist had been working with the patient, but could not complete the session as he was falling asleep.  Susy expressed frustration at the lack of progress with the overall rehab, excess secretions and financial strain dealing with insurance companies.  She is asking if a repeat CT of the head has been ordered stating that neurology recommended a repeat CT 3 months from the previous, and she says it has been beyond the 3 months.  Speech and occupational therapy are asking if anything can be prescribed to increase patient's attention span.  I did explain to Susy that the Effexor was increased to help with restlessness, and by decreasing the restlessness patient may be able to participate better in therapies.  Aricept, which was suggested by Occupational Therapy, may not have a great effect on alertness and concentration.  Susy and I discussed that we will address the issue of the CT scan on Monday 8/23.  Need to consider increasing Scopolamine patch or considering other therapies to help minimize  "secretions.      Acute on chronic hypoxemic respiratory failure:   S/p treatment pseudomonas ESBL and klebsiella pneumonia.           - excessive thick white secretions is the main barrier to decannulation           - glycopyrolate was d/thang 2 weeks back per pulmonary medicine          - RT reporting continued copious thick white secretions with frequent suctioning   - Speaking valve is being trialed during the day and patient is tolerating. PVR off with cuff inflated at night. Weaning has been slow and the biggest barrier for decannulation is excessive secretions  - Continue to wean per RT and pulmonary.   - Continue  duonebs, acetylcysteine, and 3% saline nebs   - Continue Scopolamine (started 7/20/2021). Consider increasing scopolamine patch. Patient required suctioning x 10 overnight      Ventilator associated pneumonia: Completed antibiotics on July 18.     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 sputum culture 6/19 showed MDR pseudomonas. completed Tobramycin nebs 6/24/2021 - 7/9/2021.   - Continue pulmonary toilet, scheduled duonebs.      Traumatic brain injury/intracranial hemorrage: 6/14/21 CT head done for fatigue and read as slight increase in caliber of lateral and third ventricle with possibility of developing communicating hydrocephalus. Discussed with Dr. Casillas (neurosurgeon at [facility]), who did not think there was much change. CT head repeated on 6/16/21, which did not show any change.  - Need follow up with Dr. Martinez with Neurosurgery in 3 months with repeat CTA of head and neck for post-op f/u.     Dysphagia:   -Patient failed the blue dye test 7/20/21 with immediate aspiration, continue to use tube feeds.  Did pass the blue dye test a week later but still high risk for aspiration.    - Speech therapy following. See note from 8/17/21. Per speech therapist, despite several weeks of working with patient there has been \"no functional " "improvement in patient's ability to manage his oropharyngeal secretions.\" Speech discussed this with wife. Intensive dysphagia treatment has been discontinued and speech will indirectly follow patient   - Still having significant secretions, keep NPO with tube feeds due to concerns with aspiration.      Acute metabolic encephalopathy: improved.   - Continue Ritalin and effexor  - Patient on Effexor for agitation, increased on 8/18 to 50 mg three times daily      Hypercalcemia :              - 8/20 Ca+ 10.8, has been slightly elevated but stable this last week 10.6-10.8. Evaluated by                      Nephrology. Has remained mostly unchanged after D5W with lasix IV.              - PTH 26 on 8/11/2021, vitamin D level low at 25, Has been on Ritalin since mid July calcium was  already mildly elevated before that, no other supplements noted. Possibly secondary to  immobilization prolonged hospitalization, PTH low normal and not suppressed in the setting of                       hypercalcemia. No history suggestive of any lymphoma or lymphadenopathy or any granulomatosis  related disorders to suggest any additional etiology.  - Dietician changed tube feeds to contain 800 mg less calcium compared to previous formula Calcium has been high normal since early July 1 noted 7/5/2021 @ 10.6, ionized calcium 1.4, Progressively worsening to 11s, with ionized calcium of 1.4-1.45  - Continue nephrology recommendation of oral  lasix twice daily and back to 200 mL free water flushes every 4 hours in FT.  - Weekly labs every Monday, recheck in 3 days             Hypernatremia   - Na+ 150 on 8/16/21, Normal saline flushes via tube feed stopped. Na+ 142-144 this week.    Malnutrition:    - Level of malnutrition: Severe   - Based on: moderate/severe subcutaneous fat loss, moderate/severe muscle loss      Anemia: hemoglobin stable. Continue to monitor.      Essential hypertension: BP controlled. On lisinopril     Severe debility, " weakness, critical illness, deconditioning, Continue PT/OT     Insomnia: is on Ritalin which was decreased to once daily due to insomnia when taken later in day.    - Continue nighttime mirtazapine 7.5 mg scheduled.     Diet: Adult Formula Drip Feeding: Continuous [employer] Peptide 1.5; Gastrostomy; Goal Rate: 80; mL/hr; Medication - Feeding Tube Flush Frequency: At least 15-30 mL water before and after medication administration and with tube clogging; Amount to Send ...    DVT Prophylaxis: Heparin SQ  Gutierrez Catheter: Not present  Central Lines: None  Code Status: Full Code       Disposition Plan   Expected discharge: to be determined. Barriers: bilateral hand mitts and frequent trach suctioning          Total floor/unit time spent was 25 minutes in reviewing the record, medications, lab results and completing documentation. 15 minutes spent in counseling and discussion with patient regarding diagnosis, medications and treatment plan. Care discussed and coordinated with patient and nurse.     SISI Moore [facility]  Hospitalist Service  [facility]  Securely message with the Vocera Web Console (learn more here)  Text page via [employer] Paging/Directory                      ______________________________________________________________________    Interval History   Spoke with patient's RN and respiratory therapist this morning.  Per respiratory therapy continues to have moderate to large secretions and required suctioning x10 last evening.  Respiratory therapy thinks that may be due to the tracg cuff being deflated at night.  During the day when the patient is up in the chair he is requiring less suctioning, averaging 4-6 times per shift.  Respiratory status has remained stable.  RN asking if scopolamine patch can be increased.  Patient has some hypergranulation tissue around the trach site which was assessed by the wound nurse yesterday and treated with 1 stick of silver nitrate.  Nursing continuing wound cares as ordered.  " Patient continues to need hand mitts with attempts to pull at tubes.  Patient was constipated with report of no bowel movement in 6 days.  He did have a large bowel movement on 8/18/2021 and has been having 1-2 bowel movements per day since.  I change the MiraLAX back to as needed instead of scheduled daily.  Patient was lying in bed, alert and did not appear to be in any acute distress.  Patient smiles at me when I say \"hello\" and  whispers \"no\" when I ask if he is having pain. No fevers, sputum remains thick/white.     Review of Systems: 10 point review of systems negative except for pertinent positives mentioned in HPI    Data reviewed today: I reviewed all medications, new labs and imaging results over the last 24 hours. I personally reviewed no images or EKG's today.    Physical Exam   Vital Signs: Temp: 98  F (36.7  C) Temp src: Oral BP: 117/75 Pulse: 89   Resp: 20 SpO2: 95 % O2 Device: Trach dome Oxygen Delivery: 20 LPM  Weight: 153 lbs 6.4 oz  General Appearance:  Alert, calm, no apparent distress   Respiratory: coarse anterior breath sounds, no wheezing or crackles, tracheostomy patent with no visible, PMV in place, surrounding skin around trach covered with gauze, no bleeding around trach site   Cardiovascular: S1,S2, rhythm rate regular, negative murmur   GI: Bowel sounds positive x 4, non distended non tender, G/J tube appears patent with tube feeding infusing, surrounding skin dry/intact  Skin: pink dry and intact  Neurological: alert and oriented x 1, whispers/mouths words when speaking valve in,  partially understandable, moves upper extremities, able to lift right arm all the way up, moving left arm up but weaker compared to right with chronic left sided neglet, inconsistent following commands     Data   Recent Labs   Lab 08/20/21  0611 08/18/21  0824 08/17/21  0626 08/16/21  0649   WBC  --   --   --  6.7   HGB  --   --   --  9.6*   MCV  --   --   --  97   PLT  --   --   --  377    142 145 " 150*   POTASSIUM 4.6 4.5 4.1 4.4   CHLORIDE 104 102 106 113*   CO2 28 28 29 28   BUN 39* 33* 32* 35*   CR 0.88 0.81 0.77 0.77   ANIONGAP 12 12 10 9   RAGINI 10.8* 10.6* 10.6* 10.7*    119 110 126*     Medications     dextrose       sodium chloride         acetylcysteine  2 mL Nebulization BID     famotidine  20 mg Oral or Feeding Tube BID     furosemide  20 mg Oral or Feeding Tube BID     heparin ANTICOAGULANT  5,000 Units Subcutaneous Q8H     ipratropium - albuterol 0.5 mg/2.5 mg/3 mL  3 mL Nebulization BID     lisinopril  40 mg Oral or Feeding Tube Daily     menthol-zinc oxide   Topical TID     methylphenidate  10 mg Oral or Feeding Tube QAM AC     mirtazapine  7.5 mg Per Feeding Tube At Bedtime     scopolamine  1 patch Transdermal Q72H    And     scopolamine   Transdermal Q8H     sodium chloride  3 mL Nebulization BID     venlafaxine  50 mg Per Feeding Tube TID w/meals   Restraint:     Patient pulls his IV line and trach.      Within an hour after restraint an in person face to face assessment was completed at 9:00 AM, including an evaluation of the patient's immediate reaction to the intervention, behavioral assessment and review/assessment of history, drugs and medications, recent labs, etc., and behavioral condition.  The patient experienced no adverse physical outcome from seclusion/restraint initiation.  The intervention of restraint or seclusion needs to continue

## 2021-08-20 NOTE — PLAN OF CARE
Problem: Communication Impairment (Artificial Airway)  Goal: Effective Communication  Outcome: Improving     Problem: Device-Related Complication Risk (Artificial Airway)  Goal: Optimal Device Function  Outcome: Improving     Problem: Skin and Tissue Injury (Artificial Airway)  Goal: Absence of Device-Related Skin or Tissue Injury  Outcome: Improving   RT PROGRESS NOTE     DATA:     CURRENT SETTINGS:               TRACH TYPE / SIZE: #6 BIVONA TTS Changed on 7/28/21             MODE: TM             FIO2:  21%@20 L/MIN     ACTION:             THERAPIES: DuoNeb/ Mucomyst /Sodium Chloride Neb BID, MetaNeb BID                SUCTION:                           FREQUENCY: X2                        AMOUNT: large                        CONSISTENCY: thick                        COLOR: white              SPONTANEOUS COUGH EFFORT/STRENGTH OF EFFORT (not elicited by suctioning): Fair                               WEANING PHASE: 2                        WEAN MODE: PMV days as reji                          WEAN TIME:  Since 08:45 tolerating                        END WEAN REASON: ongoing      RESPONSE:             BS: Diminished coarse                VITAL SIGNS: Sat 95-96%, HR 90-96, RR 20-22             EMOTIONAL NEEDS / CONCERNS:confused               RISK FOR SELF DECANNULATION: yes                        RISK DUE TO:  Confused                         INTERVENTION/S IN PLACE IS/ARE: Bilateral  hand  Mittens      NOTE / PLAN:  Cont. Current plan of care/Trial of speaking valve all day as tolerated.   OK to have cuff down at night.

## 2021-08-20 NOTE — PLAN OF CARE
Patient denied any pain. Patient was up on the chair and tolerated for couple hours. BP higher than normal,scheduled medication given. Will continue to monitor.

## 2021-08-20 NOTE — PLAN OF CARE
Pt is alert and was hoyered up to the chair. Lungs are coarse. Pt has copious amounts of secretions requiring frequent suctioning. Tolerating TF well. Pt has large hernia in his groin. MD aware. Complained of pain in right shoulder, pt was repositioned and given 650 mg pf tylenol which was effective. Had 1  Small BM today. Pt sat up in the chair for 5 hours. Was repositioned every 1-2 hours in the chair.  Problem: Adult Inpatient Plan of Care  Goal: Plan of Care Review  Outcome: No Change     Problem: Adult Inpatient Plan of Care  Goal: Absence of Hospital-Acquired Illness or Injury  Intervention: Identify and Manage Fall Risk  Recent Flowsheet Documentation  Taken 8/20/2021 1000 by Erika Mccloud RN  Safety Promotion/Fall Prevention:    bed alarm on    room door open    room near nurse's station  Intervention: Prevent Skin Injury  Recent Flowsheet Documentation  Taken 8/20/2021 1000 by Erika Mccloud RN  Body Position: log-rolled  Intervention: Prevent Infection  Recent Flowsheet Documentation  Taken 8/20/2021 1000 by Erika Mccloud RN  Infection Prevention:    personal protective equipment utilized    hand hygiene promoted     Problem: Device-Related Complication Risk (Mechanical Ventilation, Invasive)  Goal: Optimal Device Function  Intervention: Optimize Device Care and Function  Recent Flowsheet Documentation  Taken 8/20/2021 1000 by Erika Mccloud RN  Airway Safety Measures:    all equipment/monitors on and audible    suction equipment    suction at bedside     Problem: Ventilator-Induced Lung Injury (Mechanical Ventilation, Invasive)  Goal: Absence of Ventilator-Induced Lung Injury  Intervention: Prevent Ventilator-Associated Pneumonia  Recent Flowsheet Documentation  Taken 8/20/2021 1000 by Erika Mccloud RN  Oral Care:    oral rinse provided    lip lubricant applied     Problem: Device-Related Complication Risk (Artificial Airway)  Goal: Optimal Device Function  Intervention: Optimize Device Care  and Function  Recent Flowsheet Documentation  Taken 8/20/2021 1000 by Erika Mccloud RN  Airway Safety Measures:    all equipment/monitors on and audible    suction equipment    suction at bedside  Oral Care:    oral rinse provided    lip lubricant applied

## 2021-08-20 NOTE — PLAN OF CARE
Problem: Device-Related Complication Risk (Artificial Airway)  Goal: Optimal Device Function  Outcome: Improving  Intervention: Optimize Device Care and Function  Recent Flowsheet Documentation  Taken 8/19/2021 1911 by Kayla Small, RT  Airway Safety Measures:   all equipment/monitors on and audible   manual resuscitator/mask/valve in room   suction regulator   suction at bedside   suction equipment   oxygen flowmeter     Problem: Skin and Tissue Injury (Artificial Airway)  Goal: Absence of Device-Related Skin or Tissue Injury  Outcome: Improving     Problem: Communication Impairment (Artificial Airway)  Goal: Effective Communication  Outcome: Improving     Problem: Device-Related Complication Risk (Mechanical Ventilation, Invasive)  Goal: Optimal Device Function  Intervention: Optimize Device Care and Function  Recent Flowsheet Documentation  Taken 8/19/2021 1911 by Kayla Small, RT  Airway Safety Measures:   all equipment/monitors on and audible   manual resuscitator/mask/valve in room   suction regulator   suction at bedside   suction equipment   oxygen flowmeter

## 2021-08-21 ENCOUNTER — APPOINTMENT (OUTPATIENT)
Dept: SPEECH THERAPY | Facility: CLINIC | Age: 71
DRG: 207 | End: 2021-08-21
Attending: HOSPITALIST
Payer: COMMERCIAL

## 2021-08-21 PROCEDURE — 250N000011 HC RX IP 250 OP 636

## 2021-08-21 PROCEDURE — 250N000009 HC RX 250: Performed by: INTERNAL MEDICINE

## 2021-08-21 PROCEDURE — 250N000013 HC RX MED GY IP 250 OP 250 PS 637

## 2021-08-21 PROCEDURE — 94640 AIRWAY INHALATION TREATMENT: CPT

## 2021-08-21 PROCEDURE — 250N000013 HC RX MED GY IP 250 OP 250 PS 637: Performed by: PHYSICIAN ASSISTANT

## 2021-08-21 PROCEDURE — 94660 CPAP INITIATION&MGMT: CPT

## 2021-08-21 PROCEDURE — 94640 AIRWAY INHALATION TREATMENT: CPT | Mod: 76

## 2021-08-21 PROCEDURE — 999N000157 HC STATISTIC RCP TIME EA 10 MIN

## 2021-08-21 PROCEDURE — 250N000013 HC RX MED GY IP 250 OP 250 PS 637: Performed by: INTERNAL MEDICINE

## 2021-08-21 PROCEDURE — 94668 MNPJ CHEST WALL SBSQ: CPT

## 2021-08-21 PROCEDURE — 99232 SBSQ HOSP IP/OBS MODERATE 35: CPT | Performed by: INTERNAL MEDICINE

## 2021-08-21 PROCEDURE — 120N000017 HC R&B RESPIRATORY CARE

## 2021-08-21 PROCEDURE — 250N000013 HC RX MED GY IP 250 OP 250 PS 637: Performed by: HOSPITALIST

## 2021-08-21 PROCEDURE — 250N000013 HC RX MED GY IP 250 OP 250 PS 637: Performed by: NURSE PRACTITIONER

## 2021-08-21 PROCEDURE — 92507 TX SP LANG VOICE COMM INDIV: CPT | Mod: GN | Performed by: SPEECH-LANGUAGE PATHOLOGIST

## 2021-08-21 PROCEDURE — 999N000009 HC STATISTIC AIRWAY CARE

## 2021-08-21 RX ADMIN — LISINOPRIL 40 MG: 20 TABLET ORAL at 13:28

## 2021-08-21 RX ADMIN — VENLAFAXINE 50 MG: 25 TABLET ORAL at 16:03

## 2021-08-21 RX ADMIN — ANORECTAL OINTMENT: 15.7; .44; 24; 20.6 OINTMENT TOPICAL at 13:28

## 2021-08-21 RX ADMIN — VENLAFAXINE 50 MG: 25 TABLET ORAL at 13:27

## 2021-08-21 RX ADMIN — ANORECTAL OINTMENT: 15.7; .44; 24; 20.6 OINTMENT TOPICAL at 21:26

## 2021-08-21 RX ADMIN — HEPARIN SODIUM 5000 UNITS: 5000 INJECTION, SOLUTION INTRAVENOUS; SUBCUTANEOUS at 21:25

## 2021-08-21 RX ADMIN — HEPARIN SODIUM 5000 UNITS: 5000 INJECTION, SOLUTION INTRAVENOUS; SUBCUTANEOUS at 05:50

## 2021-08-21 RX ADMIN — IPRATROPIUM BROMIDE AND ALBUTEROL SULFATE 3 ML: 2.5; .5 SOLUTION RESPIRATORY (INHALATION) at 19:28

## 2021-08-21 RX ADMIN — HEPARIN SODIUM 5000 UNITS: 5000 INJECTION, SOLUTION INTRAVENOUS; SUBCUTANEOUS at 13:28

## 2021-08-21 RX ADMIN — VENLAFAXINE 50 MG: 25 TABLET ORAL at 08:17

## 2021-08-21 RX ADMIN — ACETYLCYSTEINE 2 ML: 200 SOLUTION ORAL; RESPIRATORY (INHALATION) at 19:29

## 2021-08-21 RX ADMIN — SODIUM CHLORIDE 30 MG/ML INHALATION SOLUTION 3 ML: 30 SOLUTION INHALANT at 07:58

## 2021-08-21 RX ADMIN — FUROSEMIDE 40 MG: 20 TABLET ORAL at 08:17

## 2021-08-21 RX ADMIN — ACETYLCYSTEINE 2 ML: 200 SOLUTION ORAL; RESPIRATORY (INHALATION) at 07:57

## 2021-08-21 RX ADMIN — MIRTAZAPINE 7.5 MG: 7.5 TABLET ORAL at 21:25

## 2021-08-21 RX ADMIN — ANORECTAL OINTMENT: 15.7; .44; 24; 20.6 OINTMENT TOPICAL at 08:18

## 2021-08-21 RX ADMIN — SODIUM CHLORIDE 30 MG/ML INHALATION SOLUTION 3 ML: 30 SOLUTION INHALANT at 19:28

## 2021-08-21 RX ADMIN — METHYLPHENIDATE HYDROCHLORIDE 10 MG: 10 TABLET ORAL at 05:47

## 2021-08-21 RX ADMIN — FAMOTIDINE 20 MG: 20 TABLET, FILM COATED ORAL at 21:25

## 2021-08-21 RX ADMIN — FAMOTIDINE 20 MG: 20 TABLET, FILM COATED ORAL at 08:17

## 2021-08-21 RX ADMIN — IPRATROPIUM BROMIDE AND ALBUTEROL SULFATE 3 ML: 2.5; .5 SOLUTION RESPIRATORY (INHALATION) at 07:57

## 2021-08-21 RX ADMIN — FUROSEMIDE 20 MG: 20 TABLET ORAL at 13:30

## 2021-08-21 ASSESSMENT — MIFFLIN-ST. JEOR: SCORE: 1472.72

## 2021-08-21 NOTE — PROGRESS NOTES
[facility]    Medicine Progress Note - Hospitalist Service       Date of Admission:  6/11/2021    Assessment & Plan             Summary:     Dayron Neri is a 70 year male with h/o HTN who presented to ED on 5/15/2021 with acute SAH after a fall with unresponsiveness.  He underwent emergent left external ventricular drain placement.  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  On 5/28/2021, patient had persistent cerebral vasospasm, CT head showed bilateral BAYLEE infarct and was treated with milrinone and verapamil.  On 6/1/2021, angiogram showed no evidence of vasospasm.  Extubated on 6/3/2021.  6/6/2021 he was reintubated due to hypoxia.  On 6/7/2021, patient underwent tracheostomy and PEG tube placement.  EVD removed 6/9/2021.   Patient was treated for possible pneumonia with Unasyn on 5/20/2021, It was switched to ceftriaxone the next day for sputum culture growing Klebsiella. On 5/23, sputum culture also grew Pseudomonas. Antibiotic was switched to Zosyn.  Due to increased WBCs in CSF, possibility of ventriculitis was raised. CSF culture was negative. Antibiotic was switched to cefepime and vancomycin on 5/28/2021 for 2 weeks.  Vanco was discontinued on 6/9/2021.  On 6/5/2021, sputum grew ESBL so cefepime was switched to meropenem. He was transferred to [facility] on 6/11/21.      Repeat sputum culture 6/19 showed MDR pseudomonas and he was started on tobramycin nebs from 6/24/2021 - 7/9/2021 .  7/8/2021 aspirated tube feeds.  Spiked a fever that evening 102.8 and WBC went up to 36.9 the next morning so ID reconsulted and was started on ceftazadime-avibactam along with flagyl 7/9/2021 - 7/18/21. He has had repeated bouts of Klebsiella infection in sputum and ventilator associated pneumonia.  Additionally he has had a large amount of tracheal secretions, significant cognitive impairment, left-sided neglect and ongoing episodes of  agitation.  Patient's family has been monitoring the patient continuously during the daytime hours via video monitor and visit with him in person over the weekends.     8/21 :   Clinically stable  Aspiratory status-down to FiO2 21% at 20 L/min which is stable over the last few days  Office to wean, continue with heated trach mask  PMV during the day  On duo nebs 4 times a day, sodium chloride neb 2 times a day for pulmonary hygiene  Continues with some secretions, remains on suctioning    Assessment & Plan      Acute on chronic hypoxemic respiratory failure:   S/p treatment pseudomonas ESBL and klebsiella pneumonia.           - excessive thick white secretions is the main barrier to decannulation           - glycopyrolate was d/thang 2 weeks back per pulmonary medicine          - RT reporting continued copious thick white secretions with frequent suctioning   - Speaking valve is being trialed during the day and patient is tolerating. PVR off with cuff inflated at night. Weaning has been slow and the biggest barrier for decannulation is excessive secretions  -Continue to wean per RT and pulmonology  - Continue  duonebs, acetylcysteine, and 3% saline nebs   - Continue Scopolamine (started 7/20/2021). Consider increasing scopolamine patch. - Patient remains on trach mask, needed fewer suctions overnight.      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 sputum culture 6/19 showed MDR pseudomonas. completed Tobramycin nebs 6/24/2021 - 7/9/2021.   - Continue pulmonary toilet, scheduled duonebs.      Traumatic brain injury/intracranial hemorrage: 6/14/21 CT head done for fatigue and read as slight increase in caliber of lateral and third ventricle with possibility of developing communicating hydrocephalus. Discussed with Dr. Casillas (neurosurgeon at [facility]), who did not think there was much  "change. CT head repeated on 6/16/21, which did not show any change.  - Need follow up with Dr. Martinez with Neurosurgery in 3 months and repeat CTA of head and neck for post-op f/u.     Dysphagia:   -Patient failed the blue dye test 7/20/21 with immediate aspiration, continue to use tube feeds.  Did pass the blue dye test a week later but still high risk for aspiration.    - Speech therapy following. See note from 8/17/21. Per speech therapist, despite several weeks of working with patient there has been \"no functional improvement in patient's ability to manage his oropharyngeal secretions.\" Speech discussed this with wife. Intensive dysphagia treatment has been discontinued and speech will indirectly follow patient   - Still having significant secretions, keep NPO with tube feeds due to concerns with aspiration.      Acute metabolic encephalopathy:  Improved  - Continue Ritalin and effexor  - Patient on Effexor for agitation, increased on 8/18 to 50 mg three times daily      Hypercalcemia :  Likely CKD stage III             - 8/20 Ca+ 10.8, has been slightly elevated but stable this last week 10.6-10.8. Evaluated by                      Nephrology. Has remained mostly unchanged after D5W with lasix IV.              - PTH 26 on 8/11/2021, vitamin D level low at 25, Has been on Ritalin since mid July calcium was  already mildly elevated before that, no other supplements noted. Possibly secondary to  immobilization prolonged hospitalization, PTH low normal and not suppressed in the setting of                       hypercalcemia. No history suggestive of any lymphoma or lymphadenopathy or any granulomatosis  related disorders to suggest any additional etiology.  - Dietician changed tube feeds to contain 800 mg less calcium compared to previous formula Calcium has been high normal since early July 1 noted 7/5/2021 @ 10.6, ionized calcium 1.4, Progressively worsening to 11s, with ionized calcium of 1.4-1.45  - Continue " nephrology recommendation of oral  lasix twice daily and back to 200 mL free water flushes every 4 hours in FT.  - Weekly labs every Monday, recheck in 3 days    -Nephrology following, appreciate assistance with care.  Lasix orally increased to 40 mg a.m. and 20 mg afternoon on 8/20/2021         Hypernatremia   - Na+ 150 on 8/16/21, Normal saline flushes via tube feed stopped. Na+ 142-144 this week.    Malnutrition:    - Level of malnutrition: Severe   - Based on: moderate/severe subcutaneous fat loss, moderate/severe muscle loss      Anemia: hemoglobin stable. Continue to monitor.      Essential hypertension: BP controlled. On lisinopril     Severe debility, weakness, critical illness, deconditioning, Continue PT/OT     Insomnia: is on Ritalin which was decreased to once daily due to insomnia when taken later in day.    - Continue nighttime mirtazapine 7.5 mg scheduled.     Diet: Adult Formula Drip Feeding: Continuous [place] Peptide 1.5; Gastrostomy; Goal Rate: 80; mL/hr; Medication - Feeding Tube Flush Frequency: At least 15-30 mL water before and after medication administration and with tube clogging; Amount to Send ...    DVT Prophylaxis: Heparin SQ  Gutierrez Catheter: Not present  Central Lines: None  Code Status: Full Code       Disposition Plan   Expected discharge: to be determined. Barriers: bilateral hand mitts and frequent trach suctioning          Total floor/unit time spent was 25 minutes in reviewing the record, medications, lab results and completing documentation. 15 minutes spent in counseling and discussion with patient regarding diagnosis, medications and treatment plan. Care discussed and coordinated with patient and nurse.     Myranda Sánchez MD  Hospitalist Service  [facility]                    ______________________________________________________________________    Interval History   Patient is new to me.  Patient seen and examined at bedside this morning.  Discussed with respiratory therapist.   He continues to have moderate to large secretions requiring suctioning overnight.  Though this seems to be slightly better than prior.  Overall his respiratory status remains stable.  He had a family member on iPad.  He continues to need hand mitts with attempts to pull tubes.  No other acute events.  No reported fevers or chills.      Review of Systems: 10 point review of systems negative except for pertinent positives mentioned in HPI    Data reviewed today: I reviewed all medications, new labs and imaging results over the last 24 hours. I personally reviewed no images or EKG's today.    Physical Exam   Vital Signs: Temp: 97.6  F (36.4  C) Temp src: Oral BP: 118/61 Pulse: 83   Resp: 20 SpO2: 92 % O2 Device: Trach dome Oxygen Delivery: 20 LPM  Weight: 153 lbs 6.4 oz  General Appearance:  Alert, calm, no apparent distress   Respiratory: coarse anterior breath sounds, no wheezing or crackles, tracheostomy patent with no visible, PMV in place, surrounding skin around trach covered with gauze, no bleeding around trach site   Cardiovascular: S1,S2, rhythm rate regular, negative murmur   GI: Bowel sounds positive x 4, non distended non tender, G/J tube appears patent with tube feeding infusing, surrounding skin dry/intact  Skin: pink dry and intact  Neurological: alert and oriented x 1, whispers/mouths words when speaking valve in,  partially understandable, moves upper extremities, able to lift right arm all the way up, moving left arm up but weaker compared to right with chronic left sided neglet, inconsistent following commands     Data   Recent Labs   Lab 08/20/21  0611 08/18/21  0824 08/17/21  0626 08/16/21  0649   WBC  --   --   --  6.7   HGB  --   --   --  9.6*   MCV  --   --   --  97   PLT  --   --   --  377    142 145 150*   POTASSIUM 4.6 4.5 4.1 4.4   CHLORIDE 104 102 106 113*   CO2 28 28 29 28   BUN 39* 33* 32* 35*   CR 0.88 0.81 0.77 0.77   ANIONGAP 12 12 10 9   RAGINI 10.8* 10.6* 10.6* 10.7*    119  110 126*     Medications     dextrose       sodium chloride         acetylcysteine  2 mL Nebulization BID     famotidine  20 mg Oral or Feeding Tube BID     furosemide  20 mg Oral or Feeding Tube Daily     furosemide  40 mg Oral or Feeding Tube Daily     heparin ANTICOAGULANT  5,000 Units Subcutaneous Q8H     ipratropium - albuterol 0.5 mg/2.5 mg/3 mL  3 mL Nebulization BID     lisinopril  40 mg Oral or Feeding Tube Daily     menthol-zinc oxide   Topical TID     methylphenidate  10 mg Oral or Feeding Tube QAM AC     mirtazapine  7.5 mg Per Feeding Tube At Bedtime     scopolamine  1 patch Transdermal Q72H    And     scopolamine   Transdermal Q8H     sodium chloride  3 mL Nebulization BID     venlafaxine  50 mg Per Feeding Tube TID w/meals   Restraint:     Patient pulls his IV line and trach.

## 2021-08-21 NOTE — PROGRESS NOTES
"   08/20/21 1310   Signing Clinician's Name / Credentials   Signing clinician's name / credentials Sheridan Clark MA, CCC-SLP   Quick Adds   Rehab Discipline SLP   SLP - Cognitive Linguistic   Minutes of Treatment 15 minutes   Treatment Detail Patient engaged in a simple cognitive task (basic situational problem solving). He was 60% accurate with this independently. Accuracy increased to 80% with mod-max cueing. Patient was essentially aphonic today. He was moving his lips, but was able to achieve only a barely audible whisper at best.  He is unable to increase his vocal volume despite max cueing, including verbal and tactile cues to use diaphragmatic breathing, as well clinician's model.  Patient was not oriented to place (stated \"grade school\") or to the fact that he is a patient in the hospital.   Additional Documentation   Rehab Comments SLP: Patient demonstrated fair attention for first 10 minutes of session, but then became less responsive and started to close his eyes. With verbal and tactile cues, he opened his eyes but was unable to actively participate or interact any further.  Session was discontinued due to patient fatigue.  Wife, Susy, was present on iPad and urged me to \"push him\" to keep working.  I explained to her that patient seemed to have reached his limit for participation during session, and would likely benefit from a break.  Susy was disappointed, but thanked me for my time.  Approximately 25 minutes later, I was asked by the RN to return to patient's room, as Susy was upset with me for not having worked with patient more today.  She told RN, \"How is he supposed to get better if they therapies just work with him for 10 minutes and then leave?\"  I spent the next 15 minutes talking directly with Susy over the iPad.  I reinforced that patient typically has a limited \"window\" in each session during which he is able to attend to therapist/task and engage and participate.  On some days, window may " "be as long as 30 minutes.  On other days, like today, it may be only 10 or 15 minutes.  Susy expressed concerns that we (therapies) are \"giving up on Matt\".  I assured her that this is not the case, but explained that his progress has been very slow and is limited by the severity of his cognitive impairment.  Susy became tearful and stated that this is no quality of life for Matt to have.  I provided empathic listening, provided further education re: brain injury, and acknowledged that this is a terrible situation for Matt, Susy, and their entire family to be facing.  I encouraged to her to seek out emotional support in her community to help her with her grieving and coping.  She seemed dismissive of this suggestion, stating that \"it won't help Matt\".  Of note, several times in the past few sessions, Susy has stated, \"I just have to believe he's going to get better.  I can't imagine this as the rest of his life.\"  I have encouraged Susy to be optimistic that Matt can continue to make some modest gains, but to also be realistic.  At present, she seems unable to consider the possibility that this could be his new baseline.  At the end of our conversation, Susy thanked me for my time and empathy.  I anticipate that she will continue to require reinforcement of the education provided today, as well as ongoing encouragement to seek support for her own emotional needs.   SLP Plan Continue to address basic attention, orientation, auditory comprehension, & verbal expression.   Total Session Time   Total Session Time (minutes) 15 minutes     "

## 2021-08-21 NOTE — PLAN OF CARE
Problem: Communication Impairment (Artificial Airway)  Goal: Effective Communication  Outcome: Improving     Problem: Device-Related Complication Risk (Artificial Airway)  Goal: Optimal Device Function  Outcome: Improving     Problem: Skin and Tissue Injury (Artificial Airway)  Goal: Absence of Device-Related Skin or Tissue Injury  Outcome: Improving   RT PROGRESS NOTE     DATA:     CURRENT SETTINGS:               TRACH TYPE / SIZE: #6 BIVONA TTS Changed on 7/28/21             MODE: TM             FIO2:  21%, 20 L/MIN     ACTION:             THERAPIES: DuoNeb/ Mucomyst /Sodium Chloride Neb BID, MetaNeb BID                 SUCTION:                           FREQUENCY: X3                        AMOUNT: mod to copious                        CONSISTENCY: thick                        COLOR: white              SPONTANEOUS COUGH EFFORT/STRENGTH OF EFFORT (not elicited by suctioning): Fair                               WEANING PHASE: 2                        WEAN MODE: PMV                         WEAN TIME: for 12 hrs 10min                         END WEAN REASON: PMV of for noc     RESPONSE:             BS: Diminished, coarse                VITAL SIGNS: Sat 94-96, HR 89, RR 20-22             EMOTIONAL NEEDS / CONCERNS:confused               RISK FOR SELF DECANNULATION: yes                        RISK DUE TO:  Confused                         INTERVENTION/S IN PLACE IS/ARE: Bilateral    Mittens inplace      NOTE / PLAN: Continue current POC

## 2021-08-21 NOTE — PLAN OF CARE
Problem: Adult Inpatient Plan of Care  Goal: Optimal Comfort and Wellbeing  Outcome: Improving     Problem: Anxiety  Goal: Anxiety Reduction or Resolution  Outcome: Improving   Patient on trach mask; was congested; suctioned x 2 of large secretions; RT suctioned x 2.  Patient tolerated suctioning.  No s/s of pain noted.  Arms active during cares; continues on bilateral mitts as restraints.  Patient calm and cooperative with cares/repositioning.  Problem: Skin and Tissue Injury (Artificial Airway)  Goal: Absence of Device-Related Skin or Tissue Injury  Outcome: Improving     Problem: Adult Inpatient Plan of Care  Goal: Absence of Hospital-Acquired Illness or Injury  Intervention: Identify and Manage Fall Risk  Recent Flowsheet Documentation  Taken 8/21/2021 0500 by Елена Rai RN  Safety Promotion/Fall Prevention:   bed alarm on   fall prevention program maintained   room door open   room near nurse's station   safety round/check completed  Intervention: Prevent and Manage VTE (Venous Thromboembolism) Risk  Recent Flowsheet Documentation  Taken 8/21/2021 0500 by Елена Rai RN  VTE Prevention/Management:   pneumatic compression device   anticoagulant therapy maintained  Intervention: Prevent Infection  Recent Flowsheet Documentation  Taken 8/21/2021 0500 by Елена Rai RN  Infection Prevention:   hand hygiene promoted   rest/sleep promoted   personal protective equipment utilized     Problem: Device-Related Complication Risk (Mechanical Ventilation, Invasive)  Goal: Optimal Device Function  Intervention: Optimize Device Care and Function  Recent Flowsheet Documentation  Taken 8/21/2021 0500 by Елена Rai RN  Airway Safety Measures:   all equipment/monitors on and audible   suction at bedside   suction regulator   suction equipment   oxygen flowmeter   manual resuscitator/mask/valve in room     Problem: Ventilator-Induced Lung Injury (Mechanical Ventilation, Invasive)  Goal: Absence of  Ventilator-Induced Lung Injury  Intervention: Prevent Ventilator-Associated Pneumonia  Recent Flowsheet Documentation  Taken 8/21/2021 0553 by Елена Rai, RN  Oral Care: swabbed with antiseptic solution     Problem: Device-Related Complication Risk (Artificial Airway)  Goal: Optimal Device Function  Intervention: Optimize Device Care and Function  Recent Flowsheet Documentation  Taken 8/21/2021 0553 by Елена Rai, RN  Oral Care: swabbed with antiseptic solution  Taken 8/21/2021 0500 by Елена Rai RN  Airway Safety Measures:   all equipment/monitors on and audible   suction at bedside   suction regulator   suction equipment   oxygen flowmeter   manual resuscitator/mask/valve in room

## 2021-08-21 NOTE — PLAN OF CARE
Problem: Adult Inpatient Plan of Care  Goal: Plan of Care Review  Outcome: No Change     Problem: Anxiety  Goal: Anxiety Reduction or Resolution  Outcome: No Change     Problem: Anemia  Goal: Anemia Symptom Improvement  Outcome: No Change     Problem: Restraint for Non-Violent/Non-Self-Destructive Behavior  Goal: Prevent/Manage Potential Problems  Description: Maintain safety of patient and others during period of restraint.  Promote psychological and physical wellbeing.  Prevent injury to skin and involved body parts.  Promote nutrition, hydration, and elimination.  Outcome: No Change     Problem: Communication Impairment (Artificial Airway)  Goal: Effective Communication  Outcome: No Change   Pt was up in chair for two hours and tolerated the activity. Confused and resistive with cares. No S/S of pain observed. Family here since 1000 am.

## 2021-08-21 NOTE — PLAN OF CARE
Problem: Mechanical Ventilation  Goal: Patient will maintain patent airway  Outcome: Progressing  Goal: Tracheostomy will be managed safely  Outcome: Progressing    RT PROGRESS NOTE     DATA:     CURRENT SETTINGS:               TRACH TYPE / SIZE: #6 Bivona, placed 7/28/2021             MODE:  TM 20L/ PMV   FIO2:  21%     ACTION:             THERAPIES: Duo/ Mucomyst neb via Metaneb, Sodium Chloride neb given on scheduled time             SUCTION:                           FREQUENCY:                        AMOUNT:  large x 2/ mod                         CONSISTENCY: thick                        COLOR:   white             SPONTANEOUS COUGH EFFORT/STRENGTH OF EFFORT (not elicited by suctioning): not observed                            WEANING PHASE:   2                        WEAN MODE: TM 21% 20L/ PMV                        WEAN TIME: PMV cont since 0850                        END WEAN REASON:      RESPONSE:             BS:   coarse - clear decreased some slightly coarse after Sx              VITAL SIGNS: O2 sat 95%, HR 92-94, RR 20             EMOTIONAL NEEDS / CONCERNS:  None               RISK FOR SELF DECANNULATION:  Yes                        RISK DUE TO: Confusion                        INTERVENTION/S IN PLACE IS/ARE: Restraints x2       NOTE / PLAN:  cont current plan of care - TM with PMV during the day, PMV off for night, cuff deflated.

## 2021-08-21 NOTE — PLAN OF CARE
Problem: Restraint for Non-Violent/Non-Self-Destructive Behavior  Goal: Prevent/Manage Potential Problems  Description: Maintain safety of patient and others during period of restraint.  Promote psychological and physical wellbeing.  Prevent injury to skin and involved body parts.  Promote nutrition, hydration, and elimination.  Outcome: No Change     Problem: Skin and Tissue Injury (Artificial Airway)  Goal: Absence of Device-Related Skin or Tissue Injury  Outcome: Improving     Patient in bilateral mitts this shift, pulls on tubes when removed. Skin in good condition. Turned and repositioned q 2 hours. Total care with ADL`s. No c/o pain this shift.  Vital signs stable. Condom cath in place.

## 2021-08-22 ENCOUNTER — APPOINTMENT (OUTPATIENT)
Dept: OCCUPATIONAL THERAPY | Facility: CLINIC | Age: 71
DRG: 207 | End: 2021-08-22
Attending: HOSPITALIST
Payer: COMMERCIAL

## 2021-08-22 PROCEDURE — 250N000013 HC RX MED GY IP 250 OP 250 PS 637: Performed by: INTERNAL MEDICINE

## 2021-08-22 PROCEDURE — 94640 AIRWAY INHALATION TREATMENT: CPT | Mod: 76

## 2021-08-22 PROCEDURE — 250N000013 HC RX MED GY IP 250 OP 250 PS 637: Performed by: NURSE PRACTITIONER

## 2021-08-22 PROCEDURE — 999N000157 HC STATISTIC RCP TIME EA 10 MIN

## 2021-08-22 PROCEDURE — 250N000013 HC RX MED GY IP 250 OP 250 PS 637: Performed by: PHYSICIAN ASSISTANT

## 2021-08-22 PROCEDURE — 94640 AIRWAY INHALATION TREATMENT: CPT

## 2021-08-22 PROCEDURE — 250N000013 HC RX MED GY IP 250 OP 250 PS 637

## 2021-08-22 PROCEDURE — 99232 SBSQ HOSP IP/OBS MODERATE 35: CPT | Performed by: INTERNAL MEDICINE

## 2021-08-22 PROCEDURE — 250N000009 HC RX 250: Performed by: INTERNAL MEDICINE

## 2021-08-22 PROCEDURE — 250N000011 HC RX IP 250 OP 636

## 2021-08-22 PROCEDURE — 97110 THERAPEUTIC EXERCISES: CPT | Mod: GO

## 2021-08-22 PROCEDURE — 999N000009 HC STATISTIC AIRWAY CARE

## 2021-08-22 PROCEDURE — 120N000017 HC R&B RESPIRATORY CARE

## 2021-08-22 PROCEDURE — 94660 CPAP INITIATION&MGMT: CPT

## 2021-08-22 PROCEDURE — 999N000123 HC STATISTIC OXYGEN O2DAILY TECH TIME

## 2021-08-22 PROCEDURE — 94668 MNPJ CHEST WALL SBSQ: CPT

## 2021-08-22 RX ADMIN — ANORECTAL OINTMENT: 15.7; .44; 24; 20.6 OINTMENT TOPICAL at 21:12

## 2021-08-22 RX ADMIN — MIRTAZAPINE 7.5 MG: 7.5 TABLET ORAL at 21:12

## 2021-08-22 RX ADMIN — ANORECTAL OINTMENT: 15.7; .44; 24; 20.6 OINTMENT TOPICAL at 13:25

## 2021-08-22 RX ADMIN — HEPARIN SODIUM 5000 UNITS: 5000 INJECTION, SOLUTION INTRAVENOUS; SUBCUTANEOUS at 13:25

## 2021-08-22 RX ADMIN — SODIUM CHLORIDE 30 MG/ML INHALATION SOLUTION 3 ML: 30 SOLUTION INHALANT at 07:00

## 2021-08-22 RX ADMIN — FAMOTIDINE 20 MG: 20 TABLET, FILM COATED ORAL at 21:12

## 2021-08-22 RX ADMIN — HEPARIN SODIUM 5000 UNITS: 5000 INJECTION, SOLUTION INTRAVENOUS; SUBCUTANEOUS at 05:28

## 2021-08-22 RX ADMIN — ACETAMINOPHEN ORAL SOLUTION 650 MG: 650 SOLUTION ORAL at 15:01

## 2021-08-22 RX ADMIN — METHYLPHENIDATE HYDROCHLORIDE 10 MG: 10 TABLET ORAL at 05:28

## 2021-08-22 RX ADMIN — VENLAFAXINE 50 MG: 25 TABLET ORAL at 13:24

## 2021-08-22 RX ADMIN — VENLAFAXINE 50 MG: 25 TABLET ORAL at 17:16

## 2021-08-22 RX ADMIN — FAMOTIDINE 20 MG: 20 TABLET, FILM COATED ORAL at 08:05

## 2021-08-22 RX ADMIN — VENLAFAXINE 50 MG: 25 TABLET ORAL at 08:06

## 2021-08-22 RX ADMIN — IPRATROPIUM BROMIDE AND ALBUTEROL SULFATE 3 ML: 2.5; .5 SOLUTION RESPIRATORY (INHALATION) at 07:00

## 2021-08-22 RX ADMIN — ANORECTAL OINTMENT: 15.7; .44; 24; 20.6 OINTMENT TOPICAL at 08:07

## 2021-08-22 RX ADMIN — HEPARIN SODIUM 5000 UNITS: 5000 INJECTION, SOLUTION INTRAVENOUS; SUBCUTANEOUS at 21:12

## 2021-08-22 RX ADMIN — SCOPALAMINE 1 PATCH: 1 PATCH, EXTENDED RELEASE TRANSDERMAL at 13:26

## 2021-08-22 RX ADMIN — ACETYLCYSTEINE 2 ML: 200 SOLUTION ORAL; RESPIRATORY (INHALATION) at 07:00

## 2021-08-22 RX ADMIN — FUROSEMIDE 40 MG: 20 TABLET ORAL at 08:09

## 2021-08-22 RX ADMIN — FUROSEMIDE 20 MG: 20 TABLET ORAL at 13:25

## 2021-08-22 RX ADMIN — ACETYLCYSTEINE 2 ML: 200 SOLUTION ORAL; RESPIRATORY (INHALATION) at 19:30

## 2021-08-22 RX ADMIN — SODIUM CHLORIDE 30 MG/ML INHALATION SOLUTION 3 ML: 30 SOLUTION INHALANT at 19:30

## 2021-08-22 RX ADMIN — IPRATROPIUM BROMIDE AND ALBUTEROL SULFATE 3 ML: 2.5; .5 SOLUTION RESPIRATORY (INHALATION) at 19:30

## 2021-08-22 ASSESSMENT — MIFFLIN-ST. JEOR: SCORE: 1514.45

## 2021-08-22 NOTE — PLAN OF CARE
Problem: Mechanical Ventilation  Goal: Patient will maintain patent airway  Outcome: Progressing  Goal: Tracheostomy will be managed safely  Outcome: Progressing    RT PROGRESS NOTE     DATA:     CURRENT SETTINGS:               TRACH TYPE / SIZE: #6 Bivona, placed 7/28/2021             MODE:  TM 20L/ PMV   FIO2:  25%     ACTION:             THERAPIES: Duo/ Mucomyst neb via Metaneb, Sodium Chloride neb given on scheduled time             SUCTION:                           FREQUENCY:                        AMOUNT:  large x 2/ mod                         CONSISTENCY: thick                        COLOR:   white             SPONTANEOUS COUGH EFFORT/STRENGTH OF EFFORT (not elicited by suctioning): not observed                            WEANING PHASE:   2                        WEAN MODE: TM 25% 20L/ PMV                        WEAN TIME: PMV cont since 0850                        END WEAN REASON:      RESPONSE:             BS:   coarse - clear decreased some slightly coarse after Sx              VITAL SIGNS: O2 sat 92-96%, HR 92-94, RR 20             EMOTIONAL NEEDS / CONCERNS:  None               RISK FOR SELF DECANNULATION:  Yes                        RISK DUE TO: Confusion                        INTERVENTION/S IN PLACE IS/ARE: Restraints x2       NOTE / PLAN:  cont current plan of care - TM with PMV during the day, PMV off for night, cuff deflated.

## 2021-08-22 NOTE — PLAN OF CARE
Problem: Communication Impairment (Artificial Airway)  Goal: Effective Communication  Outcome: Improving     Problem: Device-Related Complication Risk (Artificial Airway)  Goal: Optimal Device Function  Outcome: Improving     Problem: Skin and Tissue Injury (Artificial Airway)  Goal: Absence of Device-Related Skin or Tissue Injury  Outcome: Improving   RT PROGRESS NOTE     DATA:     CURRENT SETTINGS:               TRACH TYPE / SIZE: #6 BIVONA TTS Changed on 7/28/21             MODE: TM             FIO2:  21%, 20 L/MIN     ACTION:             THERAPIES: DuoNeb/ Mucomyst /Sodium Chloride Neb BID, MetaNeb BID                 SUCTION:                           FREQUENCY: X5                        AMOUNT: mod to large                        CONSISTENCY: thick                        COLOR: white              SPONTANEOUS COUGH EFFORT/STRENGTH OF EFFORT (not elicited by suctioning): Fair                               WEANING PHASE: 2                        WEAN MODE: PMV                         WEAN TIME: for 12 hrs 10min                         END WEAN REASON: PMV of for noc     RESPONSE:             BS: Diminished, coarse                VITAL SIGNS: Sat 93-94, HR 89-92, RR 20-22             EMOTIONAL NEEDS / CONCERNS:confused               RISK FOR SELF DECANNULATION: yes                        RISK DUE TO:  Confused                         INTERVENTION/S IN PLACE IS/ARE: Bilateral    Mittens inplace      NOTE / PLAN: Continue current POC

## 2021-08-22 NOTE — PLAN OF CARE
Pt slept well throughout the shift. Denied pain. Lungs are coarse, pt is on TM  and tolerating well. Suctioned for large amounts of secretions. Turned and repositioned cor comfort and to prevent skin break down. Pt slept well last night. Continue with bilateral mittens to prevent pulling of tubes.  Problem: Adult Inpatient Plan of Care  Goal: Plan of Care Review  Outcome: No Change     Problem: Adult Inpatient Plan of Care  Goal: Absence of Hospital-Acquired Illness or Injury  Intervention: Identify and Manage Fall Risk  Recent Flowsheet Documentation  Taken 8/22/2021 0100 by Erika Mccloud RN  Safety Promotion/Fall Prevention:   room door open   room near nurse's station  Intervention: Prevent Skin Injury  Recent Flowsheet Documentation  Taken 8/22/2021 0100 by Erika Mccloud RN  Body Position: log-rolled  Intervention: Prevent Infection  Recent Flowsheet Documentation  Taken 8/22/2021 0100 by Erika Mccloud RN  Infection Prevention:   hand hygiene promoted   equipment surfaces disinfected     Problem: Device-Related Complication Risk (Mechanical Ventilation, Invasive)  Goal: Optimal Device Function  Intervention: Optimize Device Care and Function  Recent Flowsheet Documentation  Taken 8/22/2021 0100 by Erika Mccloud RN  Airway Safety Measures:   all equipment/monitors on and audible   suction equipment   oxygen flowmeter   suction at bedside   manual resuscitator/mask/valve in room   manual resuscitator at bedside     Problem: Ventilator-Induced Lung Injury (Mechanical Ventilation, Invasive)  Goal: Absence of Ventilator-Induced Lung Injury  Intervention: Prevent Ventilator-Associated Pneumonia  Recent Flowsheet Documentation  Taken 8/22/2021 0100 by Erika Mccloud RN  Head of Bed (HOB) Positioning: HOB at 30 degrees     Problem: Device-Related Complication Risk (Artificial Airway)  Goal: Optimal Device Function  Intervention: Optimize Device Care and Function  Recent Flowsheet Documentation  Taken  8/22/2021 0100 by Erika Mccloud, RN  Airway Safety Measures:   all equipment/monitors on and audible   suction equipment   oxygen flowmeter   suction at bedside   manual resuscitator/mask/valve in room   manual resuscitator at bedside

## 2021-08-22 NOTE — PLAN OF CARE
Problem: Adult Inpatient Plan of Care  Goal: Optimal Comfort and Wellbeing  Outcome: Improving   Patient appeared more alert, calmer, cooperative with all cares and tolerated repositions well. No complain of pain.

## 2021-08-22 NOTE — PROGRESS NOTES
[facility]    Medicine Progress Note - Hospitalist Service       Date of Admission:  6/11/2021    Assessment & Plan             Summary:     Dayron Neri is a 70 year male with h/o HTN who presented to ED on 5/15/2021 with acute SAH after a fall with unresponsiveness.  He underwent emergent left external ventricular drain placement.  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  On 5/28/2021, patient had persistent cerebral vasospasm, CT head showed bilateral BAYLEE infarct and was treated with milrinone and verapamil.  On 6/1/2021, angiogram showed no evidence of vasospasm.  Extubated on 6/3/2021.  6/6/2021 he was reintubated due to hypoxia.  On 6/7/2021, patient underwent tracheostomy and PEG tube placement.  EVD removed 6/9/2021.   Patient was treated for possible pneumonia with Unasyn on 5/20/2021, It was switched to ceftriaxone the next day for sputum culture growing Klebsiella. On 5/23, sputum culture also grew Pseudomonas. Antibiotic was switched to Zosyn.  Due to increased WBCs in CSF, possibility of ventriculitis was raised. CSF culture was negative. Antibiotic was switched to cefepime and vancomycin on 5/28/2021 for 2 weeks.  Vanco was discontinued on 6/9/2021.  On 6/5/2021, sputum grew ESBL so cefepime was switched to meropenem. He was transferred to [facility] on 6/11/21.      Repeat sputum culture 6/19 showed MDR pseudomonas and he was started on tobramycin nebs from 6/24/2021 - 7/9/2021 .  7/8/2021 aspirated tube feeds.  Spiked a fever that evening 102.8 and WBC went up to 36.9 the next morning so ID reconsulted and was started on ceftazadime-avibactam along with flagyl 7/9/2021 - 7/18/21. He has had repeated bouts of Klebsiella infection in sputum and ventilator associated pneumonia.  Additionally he has had a large amount of tracheal secretions, significant cognitive impairment, left-sided neglect and ongoing episodes of  agitation.  Patient's family has been monitoring the patient continuously during the daytime hours via video monitor and visit with him in person over the weekends.     8/22/2021 :   Patient remains clinically stable, intermittently confused otherwise alert and awake  Respiratory status: Weaning phase 2, stable at FiO2 21%, 20 L/min over the last few days  PMV during the day  Continues with some secretions, needed suctioning      Assessment & Plan      Acute on chronic respiratory failure, completed treatment for pneumonia:   S/p treatment pseudomonas ESBL and klebsiella pneumonia.           - excessive thick white secretions is the main barrier to decannulation           - glycopyrolate was d/thang 2 weeks back per pulmonary medicine          - RT reporting continued copious thick white secretions with frequent suctioning   - Speaking valve is being trialed during the day and patient is tolerating. PVR off with cuff inflated at night. Weaning has been slow and the biggest barrier for decannulation is excessive secretions  -Continue to wean per RT and pulmonology  - Continue  duonebs, acetylcysteine, and 3% saline nebs   - Continue Scopolamine (started 7/20/2021). Consider increasing scopolamine patch. - Patient remains on trach mask, needed fewer suctions overnight.      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 sputum culture 6/19 showed MDR pseudomonas. completed Tobramycin nebs 6/24/2021 - 7/9/2021.   - Continue pulmonary toilet, scheduled duonebs.      Traumatic brain injury/intracranial hemorrage: 6/14/21 CT head done for fatigue and read as slight increase in caliber of lateral and third ventricle with possibility of developing communicating hydrocephalus. Discussed with Dr. Casillas (neurosurgeon at [facility]), who did not think there was much change. CT head repeated on 6/16/21, which  "did not show any change.  - Need follow up with Dr. Martinez with Neurosurgery in 3 months and repeat CTA of head and neck for post-op f/u.     Dysphagia:   -Patient failed the blue dye test 7/20/21 with immediate aspiration, continue to use tube feeds.  Did pass the blue dye test a week later but still high risk for aspiration.    - Speech therapy following. See note from 8/17/21. Per speech therapist, despite several weeks of working with patient there has been \"no functional improvement in patient's ability to manage his oropharyngeal secretions.\" Speech discussed this with wife. Intensive dysphagia treatment has been discontinued and speech will indirectly follow patient   -Given ongoing secretions, will keep n.p.o. and continue tube feeds given concern for aspiration pneumonia      Acute metabolic encephalopathy:  Improved  - Continue Ritalin and effexor  - Patient on Effexor for agitation, increased on 8/18 to 50 mg three times daily      Hypercalcemia :  Likely CKD stage III             - 8/20 Ca+ 10.8, has been slightly elevated but stable this last week 10.6-10.8. Evaluated by                      Nephrology. Has remained mostly unchanged after D5W with lasix IV.              - PTH 26 on 8/11/2021, vitamin D level low at 25, Has been on Ritalin since mid July calcium was  already mildly elevated before that, no other supplements noted. Possibly secondary to  immobilization prolonged hospitalization, PTH low normal and not suppressed in the setting of                       hypercalcemia. No history suggestive of any lymphoma or lymphadenopathy or any granulomatosis  related disorders to suggest any additional etiology.  - Dietician changed tube feeds to contain 800 mg less calcium compared to previous formula Calcium has been high normal since early July 1 noted 7/5/2021 @ 10.6, ionized calcium 1.4, Progressively worsening to 11s, with ionized calcium of 1.4-1.45  - Continue nephrology recommendation of oral  " lasix twice daily and back to 200 mL free water flushes every 4 hours in FT.  - Weekly labs every Monday, recheck in 3 days    -Nephrology following, appreciate assistance with care.  Lasix orally increased to 40 mg a.m. and 20 mg afternoon on 8/20/2021         Hypernatremia   - Na+ 150 on 8/16/21, improved with saline flushes via feeding tube.  Sodium at 1 42-1 44 from 2 to 4 days ago    Severe malnutrition:    - Level of malnutrition: Severe   - Based on: moderate/severe subcutaneous fat loss, moderate/severe muscle loss      Anemia: hemoglobin stable. Continue to monitor.      Essential hypertension: BP controlled. On lisinopril     Severe debility, weakness, critical illness, deconditioning, Continue PT/OT     Insomnia: is on Ritalin which was decreased to once daily due to insomnia when taken later in day.    - Continue nighttime mirtazapine 7.5 mg scheduled.     Diet: Adult Formula Drip Feeding: Continuous [employer] Peptide 1.5; Gastrostomy; Goal Rate: 80; mL/hr; Medication - Feeding Tube Flush Frequency: At least 15-30 mL water before and after medication administration and with tube clogging; Amount to Send ...    DVT Prophylaxis: Heparin SQ  Gutierrez Catheter: Not present  Central Lines: None  Code Status: Full Code       Disposition Plan   Expected discharge: to be determined. Barriers: bilateral hand mitts and frequent trach suctioning          Total floor/unit time spent was 25 minutes in reviewing the record, medications, lab results and completing documentation. 15 minutes spent in counseling and discussion with patient regarding diagnosis, medications and treatment plan. Care discussed and coordinated with patient and nurse.     Myranda Sánchez MD  Hospitalist Service  [facility]                    ______________________________________________________________________    Interval History   Chart reviewed.  Patient seen and examined at bedside.  No overnight acute events.  He remains with intermittent  secretions needing suctioning.  Mental status overall improving however he remains with intermittent confusion, otherwise alert and awake.  No reported pains, fevers or chills.      Review of Systems: 10 point review of systems negative except for pertinent positives mentioned in HPI    Data reviewed today: I reviewed all medications, new labs and imaging results over the last 24 hours. I personally reviewed no images or EKG's today.    Physical Exam   Vital Signs: Temp: 98.5  F (36.9  C) Temp src: Oral BP: 103/61 Pulse: 89   Resp: 24 SpO2: 93 % O2 Device: Trach dome Oxygen Delivery: 20 LPM  Weight: 159 lbs 4.8 oz  General Appearance:  Alert, calm, no apparent distress   Respiratory: coarse anterior breath sounds, no wheezing or crackles, tracheostomy patent with no visible, PMV in place, surrounding skin around trach covered with gauze, no bleeding around trach site   Cardiovascular: S1,S2, rhythm rate regular, negative murmur   GI: Bowel sounds positive x 4, non distended non tender, G/J tube appears patent with tube feeding infusing, surrounding skin dry/intact  Skin: pink dry and intact  Neurological: alert and oriented x 1, whispers/mouths words when speaking valve in,  partially understandable, moves upper extremities, able to lift right arm all the way up, moving left arm up but weaker compared to right with chronic left sided neglet, inconsistent following commands     Data   Recent Labs   Lab 08/20/21  0611 08/18/21  0824 08/17/21  0626 08/16/21  0649   WBC  --   --   --  6.7   HGB  --   --   --  9.6*   MCV  --   --   --  97   PLT  --   --   --  377    142 145 150*   POTASSIUM 4.6 4.5 4.1 4.4   CHLORIDE 104 102 106 113*   CO2 28 28 29 28   BUN 39* 33* 32* 35*   CR 0.88 0.81 0.77 0.77   ANIONGAP 12 12 10 9   RAGINI 10.8* 10.6* 10.6* 10.7*    119 110 126*     Medications     dextrose       sodium chloride         acetylcysteine  2 mL Nebulization BID     famotidine  20 mg Oral or Feeding Tube BID      furosemide  20 mg Oral or Feeding Tube Daily     furosemide  40 mg Oral or Feeding Tube Daily     heparin ANTICOAGULANT  5,000 Units Subcutaneous Q8H     ipratropium - albuterol 0.5 mg/2.5 mg/3 mL  3 mL Nebulization BID     lisinopril  40 mg Oral or Feeding Tube Daily     menthol-zinc oxide   Topical TID     methylphenidate  10 mg Oral or Feeding Tube QAM AC     mirtazapine  7.5 mg Per Feeding Tube At Bedtime     scopolamine  1 patch Transdermal Q72H    And     scopolamine   Transdermal Q8H     sodium chloride  3 mL Nebulization BID     venlafaxine  50 mg Per Feeding Tube TID w/meals

## 2021-08-22 NOTE — PLAN OF CARE
Problem: Adult Inpatient Plan of Care  Goal: Absence of Hospital-Acquired Illness or Injury  Intervention: Prevent Skin Injury  Recent Flowsheet Documentation  Taken 8/22/2021 0805 by Aster Shannon RN  Body Position: left   Pt alert and awake but with intermittent confusion. Up on the chair continuous to have copious cough. No sign and symptoms of pain noted all cares done and explained. Pt has occupation therapy working with him. IPAD on family visiting. Will continue to monitor and assess.

## 2021-08-23 ENCOUNTER — APPOINTMENT (OUTPATIENT)
Dept: OCCUPATIONAL THERAPY | Facility: CLINIC | Age: 71
DRG: 207 | End: 2021-08-23
Attending: HOSPITALIST
Payer: COMMERCIAL

## 2021-08-23 ENCOUNTER — APPOINTMENT (OUTPATIENT)
Dept: SPEECH THERAPY | Facility: CLINIC | Age: 71
DRG: 207 | End: 2021-08-23
Attending: HOSPITALIST
Payer: COMMERCIAL

## 2021-08-23 ENCOUNTER — APPOINTMENT (OUTPATIENT)
Dept: PHYSICAL THERAPY | Facility: CLINIC | Age: 71
DRG: 207 | End: 2021-08-23
Attending: HOSPITALIST
Payer: COMMERCIAL

## 2021-08-23 LAB
ANION GAP SERPL CALCULATED.3IONS-SCNC: 12 MMOL/L (ref 5–18)
BASOPHILS # BLD AUTO: 0 10E3/UL (ref 0–0.2)
BASOPHILS NFR BLD AUTO: 1 %
BUN SERPL-MCNC: 46 MG/DL (ref 8–22)
CALCIUM SERPL-MCNC: 10.9 MG/DL (ref 8.5–10.5)
CHLORIDE BLD-SCNC: 105 MMOL/L (ref 98–107)
CO2 SERPL-SCNC: 28 MMOL/L (ref 22–31)
CREAT SERPL-MCNC: 1 MG/DL (ref 0.7–1.3)
EOSINOPHIL # BLD AUTO: 0.1 10E3/UL (ref 0–0.7)
EOSINOPHIL NFR BLD AUTO: 2 %
ERYTHROCYTE [DISTWIDTH] IN BLOOD BY AUTOMATED COUNT: 14.2 % (ref 10–15)
GFR SERPL CREATININE-BSD FRML MDRD: 77 ML/MIN/1.73M2
GLUCOSE BLD-MCNC: 123 MG/DL (ref 70–125)
HCT VFR BLD AUTO: 33 % (ref 40–53)
HGB BLD-MCNC: 10.8 G/DL (ref 13.3–17.7)
IMM GRANULOCYTES # BLD: 0 10E3/UL
IMM GRANULOCYTES NFR BLD: 1 %
LYMPHOCYTES # BLD AUTO: 1.3 10E3/UL (ref 0.8–5.3)
LYMPHOCYTES NFR BLD AUTO: 16 %
MAGNESIUM SERPL-MCNC: 2.4 MG/DL (ref 1.8–2.6)
MCH RBC QN AUTO: 32.4 PG (ref 26.5–33)
MCHC RBC AUTO-ENTMCNC: 32.7 G/DL (ref 31.5–36.5)
MCV RBC AUTO: 99 FL (ref 78–100)
MONOCYTES # BLD AUTO: 0.7 10E3/UL (ref 0–1.3)
MONOCYTES NFR BLD AUTO: 8 %
NEUTROPHILS # BLD AUTO: 5.9 10E3/UL (ref 1.6–8.3)
NEUTROPHILS NFR BLD AUTO: 72 %
NRBC # BLD AUTO: 0 10E3/UL
NRBC BLD AUTO-RTO: 0 /100
PLATELET # BLD AUTO: 377 10E3/UL (ref 150–450)
POTASSIUM BLD-SCNC: 4.5 MMOL/L (ref 3.5–5)
RBC # BLD AUTO: 3.33 10E6/UL (ref 4.4–5.9)
SODIUM SERPL-SCNC: 145 MMOL/L (ref 136–145)
WBC # BLD AUTO: 8.1 10E3/UL (ref 4–11)

## 2021-08-23 PROCEDURE — 92507 TX SP LANG VOICE COMM INDIV: CPT | Mod: GN | Performed by: SPEECH-LANGUAGE PATHOLOGIST

## 2021-08-23 PROCEDURE — 85025 COMPLETE CBC W/AUTO DIFF WBC: CPT | Performed by: NURSE PRACTITIONER

## 2021-08-23 PROCEDURE — 250N000013 HC RX MED GY IP 250 OP 250 PS 637: Performed by: NURSE PRACTITIONER

## 2021-08-23 PROCEDURE — 250N000013 HC RX MED GY IP 250 OP 250 PS 637: Performed by: HOSPITALIST

## 2021-08-23 PROCEDURE — 250N000011 HC RX IP 250 OP 636

## 2021-08-23 PROCEDURE — 94640 AIRWAY INHALATION TREATMENT: CPT

## 2021-08-23 PROCEDURE — 36415 COLL VENOUS BLD VENIPUNCTURE: CPT | Performed by: INTERNAL MEDICINE

## 2021-08-23 PROCEDURE — 120N000017 HC R&B RESPIRATORY CARE

## 2021-08-23 PROCEDURE — 999N000157 HC STATISTIC RCP TIME EA 10 MIN

## 2021-08-23 PROCEDURE — 94640 AIRWAY INHALATION TREATMENT: CPT | Mod: 76

## 2021-08-23 PROCEDURE — 250N000009 HC RX 250: Performed by: INTERNAL MEDICINE

## 2021-08-23 PROCEDURE — 250N000013 HC RX MED GY IP 250 OP 250 PS 637

## 2021-08-23 PROCEDURE — 97112 NEUROMUSCULAR REEDUCATION: CPT | Mod: GP | Performed by: PHYSICAL THERAPIST

## 2021-08-23 PROCEDURE — 97530 THERAPEUTIC ACTIVITIES: CPT | Mod: GP | Performed by: PHYSICAL THERAPIST

## 2021-08-23 PROCEDURE — 94660 CPAP INITIATION&MGMT: CPT

## 2021-08-23 PROCEDURE — 99232 SBSQ HOSP IP/OBS MODERATE 35: CPT | Performed by: PHYSICIAN ASSISTANT

## 2021-08-23 PROCEDURE — 97110 THERAPEUTIC EXERCISES: CPT | Mod: GO

## 2021-08-23 PROCEDURE — 999N000009 HC STATISTIC AIRWAY CARE

## 2021-08-23 PROCEDURE — 80048 BASIC METABOLIC PNL TOTAL CA: CPT | Performed by: INTERNAL MEDICINE

## 2021-08-23 PROCEDURE — 83735 ASSAY OF MAGNESIUM: CPT | Performed by: INTERNAL MEDICINE

## 2021-08-23 PROCEDURE — 250N000013 HC RX MED GY IP 250 OP 250 PS 637: Performed by: INTERNAL MEDICINE

## 2021-08-23 PROCEDURE — 94668 MNPJ CHEST WALL SBSQ: CPT

## 2021-08-23 RX ORDER — FUROSEMIDE 20 MG
20 TABLET ORAL
Status: DISCONTINUED | OUTPATIENT
Start: 2021-08-24 | End: 2021-08-23

## 2021-08-23 RX ADMIN — HEPARIN SODIUM 5000 UNITS: 5000 INJECTION, SOLUTION INTRAVENOUS; SUBCUTANEOUS at 21:18

## 2021-08-23 RX ADMIN — ANORECTAL OINTMENT: 15.7; .44; 24; 20.6 OINTMENT TOPICAL at 09:30

## 2021-08-23 RX ADMIN — HEPARIN SODIUM 5000 UNITS: 5000 INJECTION, SOLUTION INTRAVENOUS; SUBCUTANEOUS at 14:03

## 2021-08-23 RX ADMIN — ANORECTAL OINTMENT: 15.7; .44; 24; 20.6 OINTMENT TOPICAL at 14:04

## 2021-08-23 RX ADMIN — IPRATROPIUM BROMIDE AND ALBUTEROL SULFATE 3 ML: 2.5; .5 SOLUTION RESPIRATORY (INHALATION) at 19:30

## 2021-08-23 RX ADMIN — VENLAFAXINE 50 MG: 25 TABLET ORAL at 09:28

## 2021-08-23 RX ADMIN — SODIUM CHLORIDE 30 MG/ML INHALATION SOLUTION 3 ML: 30 SOLUTION INHALANT at 19:30

## 2021-08-23 RX ADMIN — METHYLPHENIDATE HYDROCHLORIDE 10 MG: 10 TABLET ORAL at 06:35

## 2021-08-23 RX ADMIN — VENLAFAXINE 50 MG: 25 TABLET ORAL at 12:20

## 2021-08-23 RX ADMIN — SODIUM CHLORIDE 30 MG/ML INHALATION SOLUTION 3 ML: 30 SOLUTION INHALANT at 08:51

## 2021-08-23 RX ADMIN — HEPARIN SODIUM 5000 UNITS: 5000 INJECTION, SOLUTION INTRAVENOUS; SUBCUTANEOUS at 06:34

## 2021-08-23 RX ADMIN — MIRTAZAPINE 7.5 MG: 7.5 TABLET ORAL at 20:46

## 2021-08-23 RX ADMIN — FAMOTIDINE 20 MG: 20 TABLET, FILM COATED ORAL at 09:29

## 2021-08-23 RX ADMIN — VENLAFAXINE 50 MG: 25 TABLET ORAL at 17:31

## 2021-08-23 RX ADMIN — ANORECTAL OINTMENT: 15.7; .44; 24; 20.6 OINTMENT TOPICAL at 21:21

## 2021-08-23 RX ADMIN — ACETYLCYSTEINE 2 ML: 200 SOLUTION ORAL; RESPIRATORY (INHALATION) at 19:30

## 2021-08-23 RX ADMIN — FAMOTIDINE 20 MG: 20 TABLET, FILM COATED ORAL at 20:46

## 2021-08-23 RX ADMIN — ACETYLCYSTEINE 2 ML: 200 SOLUTION ORAL; RESPIRATORY (INHALATION) at 08:36

## 2021-08-23 RX ADMIN — LISINOPRIL 40 MG: 20 TABLET ORAL at 09:29

## 2021-08-23 RX ADMIN — IPRATROPIUM BROMIDE AND ALBUTEROL SULFATE 3 ML: 2.5; .5 SOLUTION RESPIRATORY (INHALATION) at 08:36

## 2021-08-23 ASSESSMENT — MIFFLIN-ST. JEOR: SCORE: 1501.3

## 2021-08-23 NOTE — PROGRESS NOTES
Suctioned extra large amount of thick yellow/white secretion from trach.  Patient is still on Scopolamine patch, will leave note for MD to re-evaluate continuous need for Scopolamine patch.    Hao Blackwood RN

## 2021-08-23 NOTE — PLAN OF CARE
Problem: Adult Inpatient Plan of Care  Goal: Optimal Comfort and Wellbeing  Outcome: Improving   Patient was alert and calm cooperative with all cares and repositions. Patient denies pain.

## 2021-08-23 NOTE — PROGRESS NOTES
RT PROGRESS NOTE    DATA  CURRENT SETTINGS --  Ventilation Mode: Trach collar  FiO2 (%): 21 %  Resp: 20     TRACH TYPE -- Bivona #6 Cuffed 7/28    ACTION  THERAPIES -- BID Mucomyst, Duoneb, and NaCl 3%  SUCTION   FREQUENCY -- 2   AMOUNT -- moderate to copious   CONSISTENCY -- thick    COLOR -- pale yellow    SPONTANEOUS COUGH -- strong   WEAN PHASE #2   MODE -- HTD 20 LPM 21%   DURATION -- 24/7   END REASON -- N/A    RESPONSE  BREATH SOUNDS -- coarse  VITAL SIGNS --  Temp:  [97.4  F (36.3  C)-98.4  F (36.9  C)] 98.4  F (36.9  C)  Pulse:  [83-95] 87  Resp:  [18-24] 20  BP: (108-127)/(65-80) 119/76  FiO2 (%):  [21 %-25 %] 21 %  SpO2:  [88 %-96 %] 93 %   EMOTIONAL CONCERNS -- None   RISK FOR SELF-DECANNULATION -- Yes; Mitt Restraints     NOTE   No signs of respiratory distress. RT will continue to monitor.     Kayla Small, RT on 8/23/2021 at 1:54 AM

## 2021-08-23 NOTE — PLAN OF CARE
Problem: Adult Inpatient Plan of Care  Goal: Absence of Hospital-Acquired Illness or Injury  Intervention: Identify and Manage Fall Risk  Recent Flowsheet Documentation  Taken 8/23/2021 0820 by Johanna Humphrey RN  Safety Promotion/Fall Prevention:   bed alarm on   room door open   room near nurse's station     Problem: Adult Inpatient Plan of Care  Goal: Absence of Hospital-Acquired Illness or Injury  Intervention: Prevent Infection  Recent Flowsheet Documentation  Taken 8/23/2021 0820 by Johanna Humphrey RN  Infection Prevention:   equipment surfaces disinfected   hand hygiene promoted   personal protective equipment utilized   rest/sleep promoted     Problem: Anxiety  Goal: Anxiety Reduction or Resolution  Outcome: No Change   Patient is alert and calm. Still observed touching face and trach area.Bilateral mittens continuously applied.  PIV infiltrated and discontinued.Tube feeding is running with free water flush increased to 300 ml Q 3 hrs.  Up in chair.Ipad on. Spouse updated with the plan of care.

## 2021-08-23 NOTE — PLAN OF CARE
Plan of Care by Jacqueline Vieyra RN at 2021  9:23 AM     Author: Jacqueline Vieyra RN Service: -- Author Type: RN, Care Manager    Filed: 2021  9:33 AM Date of Service: 2021  9:23 AM Status: Signed    : Jacqueline Vieyra RN (RN, Care Manager)       Care Management Progression of Care Update        DR GLOS - Target D/C Date TBD        PLAN/GOALS  1. Pulmonary following. Phase 2 wean~continous heated trach mask with hi rosemarie 21% Fi02 tolerated PMV days.  Mucomyst, Duo-neb, and sodium chloride neb two times a day for pulmonary hygiene.  Frequent suctioning 2-5x/shift.    2.  Nutrition management.  Tube feeding via PEG placed 21.  Registered dietician to monitor tube feeding tolerance, weight and labs.    3.  Neurology following intermittently.    4.  Nephrology following for stage 3 chronic kidney disease.    4. PT/OT 5x/week.     5.  Speech therapy 5x/week. Continue effortful swallow exercises and voicing.    6.  Palliative following.           BARRIERS  1.  Acute hypoxic respiratory failure due to subarachnoid hemorrhage. Trach and oxygen wean.    2.  Traumatic brain injury/intracranial hemorrhage/acute ischemic stroke.    3.  Oropharyngeal dysphagia.    4.   Bilateral mitts for pulling at tubes and lines.    5.  Frequent suctioning of copious secretions.    6.  Stage 3 Chronic Kidney Disease.        Disposition:  TCU  Care Manager Name:  Jacqueline Viyera RN,BSN, HNB-BC    Date/Time:  21 @ 9:37 AM

## 2021-08-23 NOTE — PROGRESS NOTES
[facility]    Medicine Progress Note - Hospitalist Service       Date of Admission:  6/11/2021    Assessment & Plan                Summary:     Dayron Neri is a 70 year male with h/o HTN who presented to ED on 5/15/2021 with acute SAH after a fall with unresponsiveness.  He underwent emergent left external ventricular drain placement.  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  On 5/28/2021, patient had persistent cerebral vasospasm, CT head showed bilateral BAYLEE infarct and was treated with milrinone and verapamil.  On 6/1/2021, angiogram showed no evidence of vasospasm.  Extubated on 6/3/2021.  6/6/2021 he was reintubated due to hypoxia.  On 6/7/2021, patient underwent tracheostomy and PEG tube placement.  EVD removed 6/9/2021.   Patient was treated for possible pneumonia with Unasyn on 5/20/2021, It was switched to ceftriaxone the next day for sputum culture growing Klebsiella. On 5/23, sputum culture also grew Pseudomonas. Antibiotic was switched to Zosyn.  Due to increased WBCs in CSF, possibility of ventriculitis was raised. CSF culture was negative. Antibiotic was switched to cefepime and vancomycin on 5/28/2021 for 2 weeks.  Vanco was discontinued on 6/9/2021.  On 6/5/2021, sputum grew ESBL so cefepime was switched to meropenem. He was transferred to [facility] on 6/11/21.      Repeat sputum culture 6/19 showed MDR pseudomonas and he was started on tobramycin nebs from 6/24/2021 - 7/9/2021 .  7/8/2021 aspirated tube feeds. Spiked a fever that evening 102.8 and WBC went up to 36.9 the next morning so ID reconsulted and was started on ceftazadime-avibactam along with flagyl 7/9/2021 - 7/18/21. He has had repeated bouts of Klebsiella infection in sputum and ventilator associated pneumonia.  Additionally he has had a large amount of tracheal secretions, significant cognitive impairment, left-sided neglect and ongoing episodes of  agitation.  Patient's family has been monitoring the patient continuously during the daytime hours via video monitor and visit with him in person over the weekends.     8/23 :   Clinically stable  Calcium remains high 10.9 with BUN mildly increased to 46, Creatinine 1.00, Na+145. Nephrology following. Off lasix currently. Free water flushes 200 ml every 4 hours  Respiratory status - Phase 2 Wean - down to FiO2 21%, 20 liters/minute. Heated trach mask with PMV during the day.    On Duo-neb, four times a day sodium chloride neb two times a day for  pulmonary hygiene.    Still having significant secretions, Frequent suctioning 4-6 x/shift. More often during the night. On scopolamine patch  Barriers to discharge : still needing high flow oxygen, frequent suctioning and needing mitt restraints      Assessment & Plan     Acute on chronic hypoxemic respiratory failure:    S/p treatment pseudomonas ESBL and klebsiella pneumonia.           - excessive thick white secretions is the main barrier to decannulation           - glycopyrolate was d/thang earlier this month per pulmonary medicine   - Speaking valve is being trialed during the day and patient is tolerating. PVR off with cuff inflated at night. Weaning has been slow and the biggest barrier for decannulation is excessive secretions  - Continue to wean per RT and pulmonary.   - Continue  duonebs, acetylcysteine, and 3% saline nebs   - Continue Scopolamine (started 7/20/2021).  Pulmonology not recommending increase in Scopolamine patch as this may increase anticholinergic effect.       Ventilator associated pneumonia: Completed antibiotics on July 18.     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 sputum culture 6/19 showed MDR pseudomonas. completed Tobramycin nebs 6/24/2021 - 7/9/2021.   - Continue pulmonary toilet, scheduled duonebs.      Traumatic brain injury/intracranial hemorrage: 6/14/21 CT head  "done for fatigue and read as slight increase in caliber of lateral and third ventricle with possibility of developing communicating hydrocephalus. Discussed with Dr. Casillas (neurosurgeon at [facility]), who did not think there was much change. CT head repeated on 6/16/21, which did not show any change.  - Need follow up with Dr. Martinez with Neurosurgery in 3 months with repeat CTA of head and neck for post-op f/u.     Dysphagia:   -Patient failed the blue dye test 7/20/21 with immediate aspiration, continue to use tube feeds.  Did pass the blue dye test a week later but still high risk for aspiration.    - Speech therapy following. See note from 8/17/21. Per speech therapist, despite several weeks of working with patient there has been \"no functional improvement in patient's ability to manage his oropharyngeal secretions.\" Speech discussed this with wife. Intensive dysphagia treatment has been discontinued and speech therapy is still continuing to follow   - Still having significant secretions, keep NPO with tube feeds due to concerns with aspiration.      Acute metabolic encephalopathy:  - Continue Ritalin and effexor  - Patient on Effexor for agitation, increased on 8/18 to 50 mg three times daily      Hypercalcemia   Elevated BUN             - 8/23 Ca+ 10.9, previous 8/20 Ca+ 10.8. Evaluated by Nephrology. Has remained mostly                              unchanged after D5W with lasix             - PTH 26 on 8/11/2021, vitamin D level low at 25, Has been on Ritalin since mid July calcium was  already mildly elevated before that, no other supplements noted. Lisa calcium possibly secondary   to immobilization prolonged hospitalization, PTH low normal and not suppressed in the setting of                   hypercalcemia. No history suggestive of any lymphoma or lymphadenopathy or any granulomatosis  related disorders to suggest any additional etiology.    - Dietician changed tube feeds to contain 800 mg less calcium " compared to previous formula     Calcium has been high normal since early July 1 noted 7/5/2021 @ 10.6, ionized calcium 1.4,       Progressively worsening to 11s, with ionized calcium of 1.4-1.45   - Per nephrology likely CKD-3; 24 hour creatinine clearance estimating GFR 45   - Continue 200 mL free water flushes every 4 hours in FT.     - Lasix stopped by nephrology today. Considering decreasing lisinopril or switching to another antihypertensive if worsening kidney function   - Weekly labs every Monday           Hypernatremia   - Na+ 150 on 8/16/21, Normal saline flushes via tube feed stopped. Na+ stable 145 today    Malnutrition:    - Level of malnutrition: Severe   - Based on: moderate/severe subcutaneous fat loss, moderate/severe muscle loss  - continuous tube feeding       Anemia: hemoglobin stable. Continue to monitor.      Essential hypertension: BP controlled. On lisinopril     Severe debility, weakness, critical illness, deconditioning, Continue PT/OT     Insomnia: is on Ritalin which was decreased to once daily due to insomnia when taken later in day.    - Continue nighttime mirtazapine 7.5 mg scheduled.    Slow transit constipation  -Constipated last week.  This improved with milk of magnesia, bisacodyl suppository and 1 dose of MiraLAX.  Currently resolved.      Diet: Adult Formula Drip Feeding: Continuous [employer] Peptide 1.5; Gastrostomy; Goal Rate:  90; mL/hr; Medication - Feeding Tube Flush Frequency: At least 15-30 mL water before and after  medication administration and with tube clogging; Amount to Send ...     DVT Prophylaxis: Heparin SQ   Gutierrez Catheter: Not present   Central Lines: None   Code Status: Full Code      Disposition Plan   Expected discharge: to be determined. Barriers: bilateral hand mitts and frequent trach suctioning          Total floor/unit time spent was 25 minutes in reviewing the record, medications, lab results and completing documentation. 15 minutes spent in counseling  "and discussion with patient regarding diagnosis, medications and treatment plan. Care discussed and coordinated with patient and nurse.     SISI Moore [facility]  Hospitalist Service  [facility]  Securely message with the Vocera Web Console (learn more here)  Text page via [employer] Paging/Directory                        ______________________________________________________________________    Interval History   Patient was up in the chair when I saw him today.  Nursing assistant and occupational therapist present.  Occupational therapist is taking the hand mitts off today and will observe patient.  Staff reporting that when the meds are removed he does try to pull at his tubes.  He continues to have confusion with periods of agitation.  He is tolerating the Passy-Paoli valve during the day.  Still requiring frequent suctioning, especially at nighttime, with the less suctioning during the day.  Patient is sitting in the chair and does not appear to be in any acute distress.  I asked if he is having pain and he says \"no.\"  Occasionally he will report shoulder pain on right side, but sometimes both shoulders, and nursing has been giving him Tylenol for this. No fever, chills, shortness of breath, chest pain or nausea. Having soft, semi-loose stools.     Review of Systems: 10 point review of systems negative except for pertinent positives mentioned in HPI    Data reviewed today: I reviewed all medications, new labs and imaging results over the last 24 hours. I personally reviewed no images or EKG's today.    Physical Exam   Vital Signs: Temp: 98.4  F (36.9  C) Temp src: Axillary BP: 130/65 Pulse: 92   Resp: 20 SpO2: 93 % O2 Device: Trach dome Oxygen Delivery: 20 LPM  Weight: 156 lbs 6.4 oz  General Appearance:  Alert, calm, up in the chair, no apparent distress   Respiratory: coarse anterior breath sounds, no wheezing or crackles, tracheostomy patent with no visible, PMV in place, surrounding skin around trach covered with " gauze, no bleeding around trach site   Cardiovascular: S1,S2, rhythm rate regular, negative murmur   GI: Bowel sounds positive x 4, non distended non tender, G/J tube appears patent with tube feeding infusing, surrounding skin dry/intact  Skin: pink dry and intact  Neurological: alert and oriented x 1, smiles, whispers/mouths words when speaking valve in,  partially understandable, moves upper extremities, moving bilaterally upper extremities, right greater than left with chronic left sided neglet, inconsistent following commands     Data   Recent Labs   Lab 08/23/21  0653 08/20/21  0611 08/18/21  0824   WBC 8.1  --   --    HGB 10.8*  --   --    MCV 99  --   --      --   --     144 142   POTASSIUM 4.5 4.6 4.5   CHLORIDE 105 104 102   CO2 28 28 28   BUN 46* 39* 33*   CR 1.00 0.88 0.81   ANIONGAP 12 12 12   RAGINI 10.9* 10.8* 10.6*    120 119     Medications     dextrose       sodium chloride         acetylcysteine  2 mL Nebulization BID     famotidine  20 mg Oral or Feeding Tube BID     heparin ANTICOAGULANT  5,000 Units Subcutaneous Q8H     ipratropium - albuterol 0.5 mg/2.5 mg/3 mL  3 mL Nebulization BID     lisinopril  40 mg Oral or Feeding Tube Daily     menthol-zinc oxide   Topical TID     methylphenidate  10 mg Oral or Feeding Tube QAM AC     mirtazapine  7.5 mg Per Feeding Tube At Bedtime     scopolamine  1 patch Transdermal Q72H    And     scopolamine   Transdermal Q8H     sodium chloride  3 mL Nebulization BID     sodium chloride (PF)  3 mL Intracatheter Q8H     venlafaxine  50 mg Per Feeding Tube TID w/meals     Restraint:     Patient pulls his IV line and trach.      Within an hour after restraint an in person face to face assessment was completed at 8:30 AM, including an evaluation of the patient's immediate reaction to the intervention, behavioral assessment and review/assessment of history, drugs and medications, recent labs, etc., and behavioral condition.  The patient experienced no  adverse physical outcome from seclusion/restraint initiation.  The intervention of restraint or seclusion needs to continue

## 2021-08-23 NOTE — PLAN OF CARE
Problem: Adult Inpatient Plan of Care  Goal: Plan of Care Review  Outcome: No Change       Pt continues to require use of bilateral hand mitts to protect lines/tubes. Wears condom catheter, which has been effective with containing urine. Incontinent of loose bm during the night. Pt becomes stiff & resistive with turning in bed to complete stanislaw care. Slept ~ 70% of the night. Pt noted to have eyes open as lies in bed. He is unable to track, stares straight ahead. PIV in (R) f/a most likely infiltrated, noted with flushing IV. Anticipate removal of IV.

## 2021-08-23 NOTE — PROGRESS NOTES
RENAL PROGRESS NOTE    CC:  Hypercalcemia     ASSESSMENT & PLAN:   70 year old male with past medical history of hypertension, admitted with subarachnoid hemorrhage after a fall on 5/15/2021 s/p emergent left external ventricular drain placement with posterior communicating artery aneurysm defied structured underwent craniotomy with clipping of aneurysm , complicated by persistent cerebral vasospasms and bilateral BAYLEE infarct.  Course also complicated by respiratory failure with intubation pneumonia was treated with cefepime and Vanco, transferred to LT[facility] on 6/11/2021.  Nephrology consulted for hypercalcemia on 8/15/2021     Hypercalcemia  Likely CKD III based on 24 hour cr clearance   Calcium has been high normal since early July 1 noted 7/5/2021 @ 10.6, ionized calcium 1.4  Was progressively worsening to 11s, with ionized calcium of 1.4-1.45  PTH 26 on 8/11/2021, vitamin D level 30 8/15/2021  Has been on Ritalin since mid July, calcium was already mildly elevated before that, no other supplements noted  Likely secondary to immobilization/ prolonged hospitalization, CKD  -PTH 26 (not appropriately suppressed) likely 2/2 so some underlying CKD   No history suggestive of any lymphoma or lymphadenopathy or any granulomatosis related disorders to suggest any additional etiology.  --Tube feeds were adjusted 8/15 to lower calcium content   -- Started on  ml every 2 hours enterally on 8/14/2021, then serum sodium 150 8/16/2021, stopped NS flushes in tube feeds 8/16/2021, started D5W at 100 ml/hr and dosed IV Lasix 20 mg. Sodium improved to normal and ca stalled in the 10's  --Stopped D5W on 8/17/21. Started free water flushes 200cc q4h.   --Started po Lasix 20 mg BID down feeding tube 8/17/21. Now with bump in cr 8/23/2021  --Likely now slightly volume behind. Will stop diuretics. Increase free water flushed to 300 ml q3h. RN will see if we can get continuous flushes set up at 100 ml/hr   -- Check BMP  8/24/21  --Could consider bisphosphonate if no improvement being primary cause of hypercalcemia likely secondary to prolonged immobilization   --Discussed with Dr Pradhan      Creatinine based GFR is preserved   24 hour cr clearance estimates GFR at 45 indicating stage 3 CKD       Hypertension lisinopril 40 mg  euvolemic   Likely elevated insensible losses with vent and tachypnea  --Blood pressure stable      Anemia  Inflammatory  Mild management per hospitalist medicine     Acute on chronic hypoxemic respiratory failure  S/p trach  ESBL Pseudomonas and Klebsiella pneumonia  Had tobramycin nebs for MDR Pseudomonas  Pulmonary following  Currently off antibiotics     Malnutrition on tube feed     SAH w fall sp craniotomy and hematoma evacuation , PCA aneurysm clip  cb BAYLEE stroke ad vasopasm  No on rehab     Insomnia/mood disorders  On Ritalin and mirtazapine  Management per primary medicine     Metabolic encephalopathy stable    SUBJECTIVE:  Stop lasix  Increase free water flushes as above, caution going any higher as patient would be at risk for aspiration   Repeat BMP tomorrow   Discussed with pts wife on ipad and all questions answered   Discussed with Dr Pradhan, nephrology   Discussed with RN in room       OBJECTIVE:  Physical Exam   Temp: 98.4  F (36.9  C) Temp src: Axillary BP: 130/65 Pulse: 92   Resp: 20 SpO2: 93 % O2 Device: Trach dome Oxygen Delivery: 20 LPM  Vitals:    08/21/21 0700 08/22/21 0600 08/23/21 0700   Weight: 68.1 kg (150 lb 1.6 oz) 72.3 kg (159 lb 4.8 oz) 70.9 kg (156 lb 6.4 oz)     Vital Signs with Ranges  Temp:  [98.2  F (36.8  C)-98.4  F (36.9  C)] 98.4  F (36.9  C)  Pulse:  [86-95] 92  Resp:  [20-24] 20  BP: (119-130)/(65-80) 130/65  FiO2 (%):  [21 %-25 %] 21 %  SpO2:  [88 %-96 %] 93 %  I/O last 3 completed shifts:  In: 2650 [I.V.:3; NG/GT:2647]  Out: 2750 [Urine:2750]      Patient Vitals for the past 72 hrs:   Weight   08/23/21 0700 70.9 kg (156 lb 6.4 oz)   08/22/21 0600 72.3 kg (159 lb 4.8 oz)    08/21/21 0700 68.1 kg (150 lb 1.6 oz)       Intake/Output Summary (Last 24 hours) at 8/16/2021 1118  Last data filed at 8/16/2021 1000  Gross per 24 hour   Intake 4214 ml   Output 1150 ml   Net 3064 ml       PHYSICAL EXAM:  General: Awake, NAD  HENT: Supple, Non-tender, no obvious JVD  Eyes: No scleral icterus  Cardiovascular: RRR, no rub, gallop, or murmur. No edema.  Respiratory: Non-labored, trach +  Gastrointestinal: Soft, non-tender, non-distended  Integumentary: Warm, dry, no rash  Neurologic: Grossly intact     LABORATORY STUDIES:     Recent Labs   Lab 08/23/21  0653   WBC 8.1   RBC 3.33*   HGB 10.8*   HCT 33.0*          Basic Metabolic Panel:  Recent Labs   Lab 08/23/21  0653 08/20/21  0611 08/18/21  0824 08/17/21  0626    144 142 145   POTASSIUM 4.5 4.6 4.5 4.1   CHLORIDE 105 104 102 106   CO2 28 28 28 29   BUN 46* 39* 33* 32*   CR 1.00 0.88 0.81 0.77    120 119 110   RAGINI 10.9* 10.8* 10.6* 10.6*       INRNo lab results found in last 7 days.     Recent Labs   Lab Test 08/23/21  0653 08/16/21  0649 05/16/21  0405 05/15/21  2125 05/15/21  1222   INR  --   --   --  1.20* 1.16*   WBC 8.1 6.7   < > 12.3* 16.5*   HGB 10.8* 9.6*   < > 11.8* 13.6    377   < > 239 257    < > = values in this interval not displayed.       Personally reviewed current labs    Elpidio Rowley PA-C  [facility]  [phone]

## 2021-08-23 NOTE — PLAN OF CARE
Problem: Communication Impairment (Artificial Airway)  Goal: Effective Communication  Outcome: Improving     Problem: Device-Related Complication Risk (Artificial Airway)  Goal: Optimal Device Function  Outcome: Improving     Problem: Skin and Tissue Injury (Artificial Airway)  Goal: Absence of Device-Related Skin or Tissue Injury  Outcome: Improving   RT PROGRESS NOTE     DATA:     CURRENT SETTINGS:               TRACH TYPE / SIZE: #6 BIVONA TTS Changed on 7/28/21             MODE: TM/cuff down at night              FIO2:  21%@20 L/MIN     ACTION:             THERAPIES: DuoNeb/ Mucomyst /Sodium Chloride Neb BID, MetaNeb BID                SUCTION:                           FREQUENCY: X 3                        AMOUNT: large                         CONSISTENCY: thick                        COLOR:    White /pale yellow              SPONTANEOUS COUGH EFFORT/STRENGTH OF EFFORT (not elicited by suctioning): Fair                               WEANING PHASE: 2                        WEAN MODE: PMV days as reji                          WEAN TIME:  Since 09:00                          END WEAN REASON: ongoing      RESPONSE:             BS: Diminished  Faint coarse                VITAL SIGNS: Sat 93-94%, HR 91-95, RR 20-22             EMOTIONAL NEEDS / CONCERNS:confused               RISK FOR SELF DECANNULATION: yes                        RISK DUE TO:  Confused                         INTERVENTION/S IN PLACE IS/ARE: Bilateral  hand  Mittens      NOTE / PLAN:  Cont. Current plan of care/ Consider advancing PMV to as reji

## 2021-08-23 NOTE — PROGRESS NOTES
"[facility] Palliative Care Social Work Note    Follow up call made to wife, Susy. She feels Matt has made some small improvements but wishes that he was progressing more. She and Yolanda had a nice visit with him this weekend. She was glad he was able to engage with them. She is frustrated that Matt has \"ended up like this\" because she thought he would have more of a meaningful recovery. She shared that she feels many people think Matt is a \"lost cause\" but she wants to remain hopeful that he can still improve. She continues to be a strong advocate for Matt and wants to be reassured that everything is being done for him that can be done. She shared that this experience has \"shattered her heart.\" She continues to spend most of her day on the IPAD with Matt or in prayer. She and Yolanda went to [employer] yesterday. Acknowledged her efforts to take care of herself and be with her daughter. She appreciates the weekly calls and welcomes them as they help her process this experience. Encouraged her to call if any needs arise. Provided active listening, validation of feelings and emotional support.     Josephine Dailey, [facility]  Palliative Care   Office # [phone]  "

## 2021-08-24 ENCOUNTER — APPOINTMENT (OUTPATIENT)
Dept: PHYSICAL THERAPY | Facility: CLINIC | Age: 71
DRG: 207 | End: 2021-08-24
Attending: HOSPITALIST
Payer: COMMERCIAL

## 2021-08-24 ENCOUNTER — APPOINTMENT (OUTPATIENT)
Dept: OCCUPATIONAL THERAPY | Facility: CLINIC | Age: 71
DRG: 207 | End: 2021-08-24
Attending: HOSPITALIST
Payer: COMMERCIAL

## 2021-08-24 ENCOUNTER — APPOINTMENT (OUTPATIENT)
Dept: SPEECH THERAPY | Facility: CLINIC | Age: 71
DRG: 207 | End: 2021-08-24
Attending: HOSPITALIST
Payer: COMMERCIAL

## 2021-08-24 LAB
ANION GAP SERPL CALCULATED.3IONS-SCNC: 11 MMOL/L (ref 5–18)
BUN SERPL-MCNC: 44 MG/DL (ref 8–22)
CALCIUM SERPL-MCNC: 10.4 MG/DL (ref 8.5–10.5)
CHLORIDE BLD-SCNC: 103 MMOL/L (ref 98–107)
CO2 SERPL-SCNC: 28 MMOL/L (ref 22–31)
CREAT SERPL-MCNC: 0.88 MG/DL (ref 0.7–1.3)
GFR SERPL CREATININE-BSD FRML MDRD: 88 ML/MIN/1.73M2
GLUCOSE BLD-MCNC: 105 MG/DL (ref 70–125)
POTASSIUM BLD-SCNC: 4.6 MMOL/L (ref 3.5–5)
SARS-COV-2 RNA RESP QL NAA+PROBE: NEGATIVE
SODIUM SERPL-SCNC: 142 MMOL/L (ref 136–145)

## 2021-08-24 PROCEDURE — 94640 AIRWAY INHALATION TREATMENT: CPT

## 2021-08-24 PROCEDURE — 97530 THERAPEUTIC ACTIVITIES: CPT | Mod: GP | Performed by: PHYSICAL THERAPIST

## 2021-08-24 PROCEDURE — 999N000123 HC STATISTIC OXYGEN O2DAILY TECH TIME

## 2021-08-24 PROCEDURE — 250N000013 HC RX MED GY IP 250 OP 250 PS 637: Performed by: HOSPITALIST

## 2021-08-24 PROCEDURE — 999N000157 HC STATISTIC RCP TIME EA 10 MIN

## 2021-08-24 PROCEDURE — 97112 NEUROMUSCULAR REEDUCATION: CPT | Mod: GP | Performed by: PHYSICAL THERAPIST

## 2021-08-24 PROCEDURE — 250N000011 HC RX IP 250 OP 636

## 2021-08-24 PROCEDURE — 120N000017 HC R&B RESPIRATORY CARE

## 2021-08-24 PROCEDURE — 36415 COLL VENOUS BLD VENIPUNCTURE: CPT | Performed by: PHYSICIAN ASSISTANT

## 2021-08-24 PROCEDURE — 87635 SARS-COV-2 COVID-19 AMP PRB: CPT | Performed by: INTERNAL MEDICINE

## 2021-08-24 PROCEDURE — 999N000009 HC STATISTIC AIRWAY CARE

## 2021-08-24 PROCEDURE — 94640 AIRWAY INHALATION TREATMENT: CPT | Mod: 76

## 2021-08-24 PROCEDURE — 99232 SBSQ HOSP IP/OBS MODERATE 35: CPT | Performed by: PHYSICIAN ASSISTANT

## 2021-08-24 PROCEDURE — 250N000009 HC RX 250: Performed by: INTERNAL MEDICINE

## 2021-08-24 PROCEDURE — 94668 MNPJ CHEST WALL SBSQ: CPT

## 2021-08-24 PROCEDURE — 250N000013 HC RX MED GY IP 250 OP 250 PS 637: Performed by: NURSE PRACTITIONER

## 2021-08-24 PROCEDURE — 92507 TX SP LANG VOICE COMM INDIV: CPT | Mod: GN | Performed by: SPEECH-LANGUAGE PATHOLOGIST

## 2021-08-24 PROCEDURE — 94660 CPAP INITIATION&MGMT: CPT

## 2021-08-24 PROCEDURE — 97110 THERAPEUTIC EXERCISES: CPT | Mod: GO | Performed by: OCCUPATIONAL THERAPIST

## 2021-08-24 PROCEDURE — 80048 BASIC METABOLIC PNL TOTAL CA: CPT | Performed by: PHYSICIAN ASSISTANT

## 2021-08-24 PROCEDURE — 250N000013 HC RX MED GY IP 250 OP 250 PS 637: Performed by: INTERNAL MEDICINE

## 2021-08-24 PROCEDURE — 250N000013 HC RX MED GY IP 250 OP 250 PS 637

## 2021-08-24 PROCEDURE — 94003 VENT MGMT INPAT SUBQ DAY: CPT | Performed by: INTERNAL MEDICINE

## 2021-08-24 RX ADMIN — SODIUM CHLORIDE 30 MG/ML INHALATION SOLUTION 3 ML: 30 SOLUTION INHALANT at 07:33

## 2021-08-24 RX ADMIN — LISINOPRIL 40 MG: 20 TABLET ORAL at 08:15

## 2021-08-24 RX ADMIN — IPRATROPIUM BROMIDE AND ALBUTEROL SULFATE 3 ML: 2.5; .5 SOLUTION RESPIRATORY (INHALATION) at 07:32

## 2021-08-24 RX ADMIN — VENLAFAXINE 50 MG: 25 TABLET ORAL at 08:15

## 2021-08-24 RX ADMIN — HEPARIN SODIUM 5000 UNITS: 5000 INJECTION, SOLUTION INTRAVENOUS; SUBCUTANEOUS at 13:28

## 2021-08-24 RX ADMIN — ANORECTAL OINTMENT: 15.7; .44; 24; 20.6 OINTMENT TOPICAL at 20:56

## 2021-08-24 RX ADMIN — VENLAFAXINE 50 MG: 25 TABLET ORAL at 12:00

## 2021-08-24 RX ADMIN — ACETYLCYSTEINE 2 ML: 200 SOLUTION ORAL; RESPIRATORY (INHALATION) at 07:32

## 2021-08-24 RX ADMIN — IPRATROPIUM BROMIDE AND ALBUTEROL SULFATE 3 ML: 2.5; .5 SOLUTION RESPIRATORY (INHALATION) at 20:12

## 2021-08-24 RX ADMIN — FAMOTIDINE 20 MG: 20 TABLET, FILM COATED ORAL at 08:15

## 2021-08-24 RX ADMIN — ACETYLCYSTEINE 2 ML: 200 SOLUTION ORAL; RESPIRATORY (INHALATION) at 20:12

## 2021-08-24 RX ADMIN — HEPARIN SODIUM 5000 UNITS: 5000 INJECTION, SOLUTION INTRAVENOUS; SUBCUTANEOUS at 05:30

## 2021-08-24 RX ADMIN — ANORECTAL OINTMENT: 15.7; .44; 24; 20.6 OINTMENT TOPICAL at 08:15

## 2021-08-24 RX ADMIN — VENLAFAXINE 50 MG: 25 TABLET ORAL at 17:28

## 2021-08-24 RX ADMIN — MIRTAZAPINE 7.5 MG: 7.5 TABLET ORAL at 20:56

## 2021-08-24 RX ADMIN — HEPARIN SODIUM 5000 UNITS: 5000 INJECTION, SOLUTION INTRAVENOUS; SUBCUTANEOUS at 21:02

## 2021-08-24 RX ADMIN — ANORECTAL OINTMENT: 15.7; .44; 24; 20.6 OINTMENT TOPICAL at 13:29

## 2021-08-24 RX ADMIN — METHYLPHENIDATE HYDROCHLORIDE 10 MG: 10 TABLET ORAL at 05:29

## 2021-08-24 RX ADMIN — FAMOTIDINE 20 MG: 20 TABLET, FILM COATED ORAL at 20:56

## 2021-08-24 RX ADMIN — SODIUM CHLORIDE 30 MG/ML INHALATION SOLUTION 3 ML: 30 SOLUTION INHALANT at 20:12

## 2021-08-24 ASSESSMENT — MIFFLIN-ST. JEOR: SCORE: 1487.86

## 2021-08-24 NOTE — PROGRESS NOTES
RENAL PROGRESS NOTE    CC:  Hypercalcemia     ASSESSMENT & PLAN:   70 year old male with past medical history of hypertension, admitted with subarachnoid hemorrhage after a fall on 5/15/2021 s/p emergent left external ventricular drain placement with posterior communicating artery aneurysm defied structured underwent craniotomy with clipping of aneurysm , complicated by persistent cerebral vasospasms and bilateral BAYLEE infarct.  Course also complicated by respiratory failure with intubation pneumonia was treated with cefepime and Vanco, transferred to LTAC on 6/11/2021.  Nephrology consulted for hypercalcemia on 8/15/2021     Hypercalcemia  Likely CKD III based on 24 hour cr clearance   Calcium has been high normal since early July 1 noted 7/5/2021 @ 10.6, ionized calcium 1.4  Was progressively worsening to 11s, with ionized calcium of 1.4-1.45  PTH 26 on 8/11/2021, vitamin D level 30 8/15/2021  Has been on Ritalin since mid July, calcium was already mildly elevated before that, no other supplements noted  Likely secondary to immobilization/ prolonged hospitalization, CKD  -PTH 26 (not appropriately suppressed) likely 2/2 so some underlying CKD   No history suggestive of any lymphoma or lymphadenopathy or any granulomatosis related disorders to suggest any additional etiology.  --Tube feeds were adjusted 8/15 to lower calcium content   -- Started on  ml every 2 hours enterally on 8/14/2021, then serum sodium 150 8/16/2021, stopped NS flushes in tube feeds 8/16/2021, started D5W at 100 ml/hr and dosed IV Lasix 20 mg. Sodium improved to normal and ca stalled in the 10's  --Stopped D5W on 8/17/21. Started free water flushes 200cc q4h.   --Started po Lasix 20 mg BID down feeding tube 8/17/21. Now with bump in cr 8/23/2021  --Likely now slightly volume behind. Will stop diuretics. Increase free water flushed to 300 ml q3h. RN will see if we can get continuous flushes set up at 100 ml/hr   --8/24/21 cr 0.88 and  ca improved to 10.4, cont the same. Recheck BMP 8/30/21  --Discussed with Dr Pradhan      Creatinine based GFR is preserved   24 hour cr clearance estimates GFR at 45 indicating stage 3 CKD       Hypertension lisinopril 40 mg    euvolemic   Likely elevated insensible losses with vent and tachypnea  --Blood pressure stable      Anemia  Inflammatory  Mild management per hospitalist medicine     Acute on chronic hypoxemic respiratory failure  S/p trach  ESBL Pseudomonas and Klebsiella pneumonia  Had tobramycin nebs for MDR Pseudomonas  Pulmonary following  Currently off antibiotics     Malnutrition on tube feed, adjusted for lower calcium      SAH w fall sp craniotomy and hematoma evacuation , PCA aneurysm clip  cb BAYLEE stroke ad vasopasm  No on rehab     Insomnia/mood disorders  On Ritalin and mirtazapine  Management per primary medicine     Metabolic encephalopathy stable    SUBJECTIVE:  Cr at baseline  Calcium improved with increased free water flushes   Discussed with pts wife on ipad and all questions answered   Discussed with Dr Pradhan, nephrology   Discussed with RN in room       OBJECTIVE:  Physical Exam   Temp: 97.8  F (36.6  C) Temp src: Axillary BP: 127/83 Pulse: 84   Resp: 20 SpO2: 95 % O2 Device: Trach dome Oxygen Delivery: 20 LPM  Vitals:    08/22/21 0600 08/23/21 0700 08/24/21 0538   Weight: 72.3 kg (159 lb 4.8 oz) 70.9 kg (156 lb 6.4 oz) 69.6 kg (153 lb 7 oz)     Vital Signs with Ranges  Temp:  [97.8  F (36.6  C)-98.4  F (36.9  C)] 97.8  F (36.6  C)  Pulse:  [81-93] 84  Resp:  [20-23] 20  BP: (106-127)/(76-83) 127/83  FiO2 (%):  [21 %] 21 %  SpO2:  [93 %-98 %] 95 %  I/O last 3 completed shifts:  In: 3867 [NG/GT:3867]  Out: 2600 [Urine:2600]      Patient Vitals for the past 72 hrs:   Weight   08/24/21 0538 69.6 kg (153 lb 7 oz)   08/23/21 0700 70.9 kg (156 lb 6.4 oz)   08/22/21 0600 72.3 kg (159 lb 4.8 oz)       Intake/Output Summary (Last 24 hours) at 8/16/2021 1118  Last data filed at 8/16/2021 1000  Gross per  24 hour   Intake 4214 ml   Output 1150 ml   Net 3064 ml       PHYSICAL EXAM:  General: Awake, NAD  HENT: Supple, Non-tender, no obvious JVD  Eyes: No scleral icterus  Cardiovascular: RRR, no rub, gallop, or murmur. No edema.  Respiratory: Non-labored, trach +  Gastrointestinal: Soft, non-tender, non-distended  Integumentary: Warm, dry, no rash  Neurologic: Grossly intact     LABORATORY STUDIES:     Recent Labs   Lab 08/23/21  0653   WBC 8.1   RBC 3.33*   HGB 10.8*   HCT 33.0*          Basic Metabolic Panel:  Recent Labs   Lab 08/24/21  0552 08/23/21  0653 08/20/21  0611 08/18/21  0824    145 144 142   POTASSIUM 4.6 4.5 4.6 4.5   CHLORIDE 103 105 104 102   CO2 28 28 28 28   BUN 44* 46* 39* 33*   CR 0.88 1.00 0.88 0.81    123 120 119   RAGINI 10.4 10.9* 10.8* 10.6*       INRNo lab results found in last 7 days.     Recent Labs   Lab Test 08/23/21  0653 08/16/21  0649 05/16/21  0405 05/15/21  2125 05/15/21  1222   INR  --   --   --  1.20* 1.16*   WBC 8.1 6.7   < > 12.3* 16.5*   HGB 10.8* 9.6*   < > 11.8* 13.6    377   < > 239 257    < > = values in this interval not displayed.       Personally reviewed current labs    Elpidio Rowley PA-C  [facility]  [phone]

## 2021-08-24 NOTE — PLAN OF CARE
Problem: Device-Related Complication Risk (Artificial Airway)  Goal: Optimal Device Function  Outcome: Improving  Intervention: Optimize Device Care and Function  Recent Flowsheet Documentation  Taken 8/23/2021 1930 by Timoteo Tran RT  Airway Safety Measures:    all equipment/monitors on and audible    manual resuscitator at bedside    suction at bedside    suction equipment    oxygen flowmeter     Problem: Skin and Tissue Injury (Artificial Airway)  Goal: Absence of Device-Related Skin or Tissue Injury  Outcome: Improving     Problem: Communication Impairment (Artificial Airway)  Goal: Effective Communication  Outcome: Improving  RT PROGRESS NOTE     DATA:     CURRENT SETTINGS:             TRACH TYPE / SIZE: #6 Bivona (Cuff down, placed 07/28)             MODE:  Trach Dome             FIO2:   21%, 20 L     ACTION:             THERAPIES: Duo-neb/Mucomyst/Saline/MetaNeb BID             SUCTION: Yes                          FREQUENCY: 5x                        AMOUNT:   Moderate                        CONSISTENCY: Thick (Lavaged)                        COLOR:   Pale yellow             SPONTANEOUS COUGH EFFORT/STRENGTH OF EFFORT (not elicited by suctioning): Good productive cough.                              WEANING PHASE:   2                        WEAN MODE:  TM with PMV                        WEAN TIME:  12 hours and 4 minutes                        END WEAN REASON: PMV off at noc      RESPONSE:             BS:   Coarse             VITAL SIGNS: Sat 93-96%, HR 81-93, RR 20-23             EMOTIONAL NEEDS / CONCERNS:  None                RISK FOR SELF DECANNULATION:  Yes                        RISK DUE TO:  Confusion                        INTERVENTION/S IN PLACE IS/ARE: Mitts x2       NOTE / PLAN:      Pt remains on Trach Dome and tolerating well with no distress.  Continue current care plan.

## 2021-08-24 NOTE — PLAN OF CARE
Problem: Adult Inpatient Plan of Care  Goal: Optimal Comfort and Wellbeing  Outcome: No Change     Problem: Anxiety  Goal: Anxiety Reduction or Resolution  Outcome: No Change     Problem: Adult Inpatient Plan of Care  Goal: Absence of Hospital-Acquired Illness or Injury  Intervention: Identify and Manage Fall Risk  Recent Flowsheet Documentation  Taken 8/24/2021 0816 by Johanna Humphrey RN  Safety Promotion/Fall Prevention:   bed alarm on   chair alarm on   room door open   room near nurse's station  Intervention: Prevent Infection  Recent Flowsheet Documentation  Taken 8/24/2021 0816 by Johanna Humphrey RN  Infection Prevention:   equipment surfaces disinfected   hand hygiene promoted   personal protective equipment utilized   Patient is alert and talks at times. Was on a speaking valve and interacting with wife on ipad.Still on bilateral mittens for safety.Vital signs are WDL.Appears comfortable.Tube feeding is running at 90 ml/hour with free water flush of 300 ml every 3 hrs.Had one BM and has good output.

## 2021-08-24 NOTE — PROGRESS NOTES
CLINICAL NUTRITION SERVICES - REASSESSMENT NOTE      Recommendations Ordered by Registered Dietitian (RD):   Continue current TF regimen as ordered  Feeding tube flushes per nephrology   Malnutrition:   % Weight Loss:  None noted  % Intake:  No decreased intake noted  Subcutaneous Fat Loss:  Orbital region severe depletion and Upper arm region severe depletion  Muscle Loss:  Temporal region severe depletion, Clavicle bone region severe depletion, Acromion bone region severe depletion, Dorsal hand region severe depletion, Anterior thigh region severe depletion and Posterior calf region severe depletion  Fluid Retention:  None noted, suspect some fluid retention given 10# weight gain in 14 days    Malnutrition Diagnosis: Severe malnutrition  In Context of:  Chronic illness or disease     EVALUATION OF PROGRESS TOWARD GOALS   Diet:  None    Nutrition Support:    Adult Formula [employer] Peptide 1.5    Route Gastrostomy    Goal Rate 90    Goal Units mL/hr      Free Water - Feeding Tube Flush Frequency Other    Specify Q 3 hours    Additional Free Water 300    FWF per nephrology    Intake/Tolerance:  Pt. At goal rate   Enteral at goal  Provides the following to meet estimated needs :   2160 mL daily provides 3240 kcal, 160g protein, 260g CHO, 17g fiber and 1512 mL free water from formula + 1080 ml from flushes =2592 ml/d. Meets 100% of DRI's (Total Ca+ = 2016 mg)    ASSESSED NUTRITION NEEDS:  Dosing Weight:  69.6 kg    NEW FINDINGS:   - last BM 8/23, normal  - nephrology following for hypercalcemia - monitoring diuresis and fluid requirements  - weight up 7# since 8/10 - likely fluid-related. Unable to determine true weight    Labs:  Electrolytes  Potassium (mmol/L)   Date Value   08/24/2021 4.6   08/23/2021 4.5   08/20/2021 4.6   06/11/2021 3.8   06/10/2021 3.7   06/09/2021 3.8     Phosphorus (mg/dL)   Date Value   08/13/2021 4.2   08/11/2021 4.3   06/11/2021 2.9   06/10/2021 2.5   06/09/2021 2.5   06/08/2021 2.3 (L)    06/07/2021 2.9    Blood Glucose  Glucose (mg/dL)   Date Value   08/24/2021 105   08/23/2021 123   08/20/2021 120   08/18/2021 119   08/17/2021 110   06/11/2021 142 (H)   06/10/2021 134 (H)   06/09/2021 117 (H)   06/08/2021 125 (H)   06/07/2021 103 (H)     Hemoglobin A1C (%)   Date Value   05/15/2021 5.5    Inflammatory Markers  CRP Inflammation (mg/L)   Date Value   06/01/2021 79.0 (H)   05/30/2021 120.0 (H)   05/28/2021 150.0 (H)     WBC (10e9/L)   Date Value   06/11/2021 8.2   06/10/2021 9.4   06/09/2021 10.4     WBC Count (10e3/uL)   Date Value   08/23/2021 8.1   08/16/2021 6.7   08/09/2021 8.0     Albumin (g/dL)   Date Value   08/13/2021 2.9 (L)   08/11/2021 2.9 (L)   06/14/2021 2.1 (L)   06/02/2021 1.7 (L)   05/26/2021 2.1 (L)   05/23/2021 1.9 (L)      Magnesium (mg/dL)   Date Value   08/23/2021 2.4   08/16/2021 2.0   06/11/2021 2.5 (H)   06/10/2021 2.4 (H)   06/09/2021 2.4 (H)     Sodium (mmol/L)   Date Value   08/24/2021 142   08/23/2021 145   08/20/2021 144   06/11/2021 143   06/10/2021 142   06/09/2021 143    Renal  Urea Nitrogen (mg/dL)   Date Value   08/24/2021 44 (H)   08/23/2021 46 (H)   08/20/2021 39 (H)   06/11/2021 25   06/10/2021 23   06/09/2021 24     Creatinine (mg/dL)   Date Value   08/24/2021 0.88   08/23/2021 1.00   08/20/2021 0.88   06/11/2021 0.57 (L)   06/10/2021 0.52 (L)   06/09/2021 0.49 (L)     Additional  Triglycerides (mg/dL)   Date Value   05/28/2021 91   06/05/2013 178 (H)   09/19/2007 109     Ketones Urine   Date Value   07/01/2021 Negative   06/01/2021 Negative mg/dL        Previous Goals:   n/a  Evaluation: Unable to evaluate    Previous Nutrition Diagnosis:   n/a  Evaluation: unable to evaluate    CURRENT NUTRITION DIAGNOSIS  Impaired nutrient utilization related to possible CKD 3 as evidenced by hypercalcemia    INTERVENTIONS  Recommendations / Nutrition Prescription  Continue current TF regimen as ordered  Feeding tube flushes per nephrology    Implementation  EN Composition,  EN Schedule and Feeding Tube Flush    Goals  Support calcium levels by continuing to use lower Ca-containing TF formula ([employer] Peptide)    MONITORING AND EVALUATION:  Progress towards goals will be monitored and evaluated per protocol and Practice Guidelines    Dulce Gonzales RDN, LD  Clinical Dietitian

## 2021-08-24 NOTE — PLAN OF CARE
Problem: Adult Inpatient Plan of Care  Goal: Absence of Hospital-Acquired Illness or Injury  8/24/2021 0703 by Kristal Jaramillo RN  Outcome: No Change  8/24/2021 0657 by Kristal Jaramillo RN  Outcome: No Change  Intervention: Identify and Manage Fall Risk  Recent Flowsheet Documentation  Taken 8/24/2021 0145 by Kristal Jaramillo RN  Safety Promotion/Fall Prevention:   bed alarm on   room door open   room near nurse's station   safety round/check completed  Goal: Optimal Comfort and Wellbeing  8/24/2021 0703 by Kristal Jaramillo RN  Outcome: No Change  8/24/2021 0657 by Kristal Jaramillo RN  Outcome: No Change     Problem: Anxiety  Goal: Anxiety Reduction or Resolution  8/24/2021 0703 by Kristal Jaramillo RN  Outcome: No Change  8/24/2021 0657 by Kristal Jaramillo RN  Outcome: No Change     Problem: Restraint for Non-Violent/Non-Self-Destructive Behavior  Goal: Prevent/Manage Potential Problems  Description: Maintain safety of patient and others during period of restraint.  Promote psychological and physical wellbeing.  Prevent injury to skin and involved body parts.  Promote nutrition, hydration, and elimination.  8/24/2021 0703 by Kristal Jaramillo RN  Outcome: No Change  8/24/2021 0657 by Kristal Jaramillo RN  Outcome: No Change      Slept 5-6 hours the whole night, no complaints of pain. Bilateral mitts in place for safety, monitored.

## 2021-08-24 NOTE — PROGRESS NOTES
Pulmonary Medicine Follow up Note - Ventilator Rounds:    Clinical status discussed today with RN and respiratory therapist.    Chief complaint: Ongoing respiratory failure  Significant change in clinical status during past 24 hours? No      Ventilation Mode: Trach collar  FiO2 (%): 21 %  Resp: 20    Tracheal secretions: Moderate secretions requiring suctioning 5*.     Current phase of ventilator weaning pathway:  Phase 2 with PMV days as tolerated, PMV off with cuff inflated at night    Ventilator weaning results from yesterday: TM 0.3/30Lpm, PMV all day  PMV off for night, cuff up.     Current Facility-Administered Medications   Medication     acetaminophen (TYLENOL) solution 650 mg    Or     acetaminophen (TYLENOL) tablet 650 mg     acetylcysteine (MUCOMYST) 20 % nebulizer solution 2 mL     alteplase (CATHFLO ACTIVASE) injection 2 mg     bisacodyl (DULCOLAX) Suppository 10 mg     dextrose 10% infusion     dextrose 50 % injection 25-50 mL    Or     glucagon injection 1 mg     docusate (COLACE) 50 MG/5ML liquid 100 mg    Or     docusate sodium (COLACE) capsule 100 mg     famotidine (PEPCID) tablet 20 mg     heparin ANTICOAGULANT injection 5,000 Units     hydrALAZINE (APRESOLINE) injection 10 mg     ipratropium - albuterol 0.5 mg/2.5 mg/3 mL (DUONEB) neb solution 3 mL     lisinopril (ZESTRIL) tablet 40 mg     loperamide (IMODIUM) liquid 1 mg     magnesium hydroxide (MILK OF MAGNESIA) suspension 30 mL     menthol-zinc oxide (CALMOSEPTINE) 0.44-20.6 % ointment OINT     methylphenidate (RITALIN) tablet 10 mg     mirtazapine (REMERON) tablet TABS 7.5 mg     naloxone (NARCAN) injection 0.2 mg    Or     naloxone (NARCAN) injection 0.4 mg    Or     naloxone (NARCAN) injection 0.2 mg    Or     naloxone (NARCAN) injection 0.4 mg     ondansetron (ZOFRAN) solution 4 mg     polyethylene glycol (MIRALAX) Packet 17 g     scopolamine (TRANSDERM) 72 hr patch 1 patch    And     scopolamine (TRANSDERM-SCOP) Patch in Place     silver  "nitrate (ARZOL) Misc     sodium chloride (NEBUSAL) 3 % neb solution 3 mL     sodium chloride (PF) 0.9% PF flush 3 mL     sodium chloride 0.9% infusion     venlafaxine (EFFEXOR) tablet 50 mg     Physical exam     /83 (BP Location: Left arm)   Pulse 84   Temp 98.3  F (36.8  C) (Axillary)   Resp 20   Ht 1.803 m (5' 10.98\")   Wt 69.6 kg (153 lb 7 oz)   SpO2 95%   BMI 21.41 kg/m      Gen: awake, tracks intermittently  HEENT: AT/NC. Trach in place.   Lungs: diminished ant otherwise clear  CV: regular, no murmurs or gallops appreciated  Abdomen: soft, NT, BS wnl  Ext: no edema  Neuro: awake, tracking intermittently, smiles. Leans to his Right.     Sodium   Date Value Ref Range Status   08/24/2021 142 136 - 145 mmol/L Final   06/11/2021 143 133 - 144 mmol/L Final     Potassium   Date Value Ref Range Status   08/24/2021 4.6 3.5 - 5.0 mmol/L Final   06/11/2021 3.8 3.4 - 5.3 mmol/L Final     Chloride   Date Value Ref Range Status   08/24/2021 103 98 - 107 mmol/L Final   06/11/2021 106 94 - 109 mmol/L Final     Carbon Dioxide   Date Value Ref Range Status   06/11/2021 36 (H) 20 - 32 mmol/L Final     Carbon Dioxide (CO2)   Date Value Ref Range Status   08/24/2021 28 22 - 31 mmol/L Final     Anion Gap   Date Value Ref Range Status   08/24/2021 11 5 - 18 mmol/L Final   06/11/2021 2 (L) 3 - 14 mmol/L Final     Glucose   Date Value Ref Range Status   08/24/2021 105 70 - 125 mg/dL Final   06/11/2021 142 (H) 70 - 99 mg/dL Final     Urea Nitrogen   Date Value Ref Range Status   08/24/2021 44 (H) 8 - 22 mg/dL Final   06/11/2021 25 7 - 30 mg/dL Final     Creatinine   Date Value Ref Range Status   08/24/2021 0.88 0.70 - 1.30 mg/dL Final   06/11/2021 0.57 (L) 0.66 - 1.25 mg/dL Final     GFR Estimate   Date Value Ref Range Status   08/24/2021 88 >60 mL/min/1.73m2 Final     Comment:     As of July 11, 2021, eGFR is calculated by the CKD-EPI creatinine equation, without race adjustment. eGFR can be influenced by muscle mass, " exercise, and diet. The reported eGFR is an estimation only and is only applicable if the renal function is stable.   07/09/2021 >60 >60 mL/min/1.73m2 Final   06/11/2021 >90 >60 mL/min/[1.73_m2] Final     Comment:     Non  GFR Calc  Starting 12/18/2018, serum creatinine based estimated GFR (eGFR) will be   calculated using the Chronic Kidney Disease Epidemiology Collaboration   (CKD-EPI) equation.       Calcium   Date Value Ref Range Status   08/24/2021 10.4 8.5 - 10.5 mg/dL Final   06/11/2021 9.6 8.5 - 10.1 mg/dL Final        Diagnosis:     Patient is a 69 yo man with history of hypertension. Patient initially presented on 15-May-2021 to [facility] after being found unresponsive by his wife. Patient was then found to have a subarachnoid hemorrhage secondary to a ruptured aneurysm. Patient was intubated and was started on mannitol, IV antihypertensives and hyperventilation and patient was then transferred to [facility]. Patient underwent aneurysm clipping as well as placement of extra-ventricular drain from hydrocephalus on 15-May-2021. Extra-ventricular drain would malfunction and would require replacement on 04-Jun-2021 and 06-Jun-2021. Extra-ventricular drain reportedly was removed on 09-Jun-2021. Patient had intra-arterial vasospasm treatment with verapamil on 28-May-2021.  Patient was extubated on 16-May-2021 but had to be reintubated on 19-May-2021. Patient would be extubated on 03-Jun-2021 but would be reintubated on 06-Jun-2021. Patient had tracheostomy and PEG-tube placed on 07-Jun-2021. Patient required treatment for hospital-acquired pneumonia. Patient was transferred to LTAC on 11-Jun-2021.    1. Acute respiratory failure s/p trach 6/7/2021  2. S/p treatment pseudomonas ESBL and klebsiella pneumonia   3. Encephalopathy   4. Subarachnoid bleed s/p craniotomy and clipping P-comm rupture aneurysm.   5. HTN  6. Malnourishment   7. Dysphagia s/p G  tube    Recommendations/Plans (MDM):  1. Weaning orders: continue phase 2 with PMV days as tolerated, cuff down at night.    2. Trach change? Routine #6 bivona, next changed due 8/28  3. Diagnostic testing? no  4. Continue pulmonary toiletting, scheduled ipratropium-albuterol, 3% saline, acetylcysteine, metaneb.  5. Scopolamine patch; would not add additional patch given risk of anticholinergic CNS effects and secretion inspissation     Jaylyn Cartagena MD  Pulmonary and Critical Care  (P) [phone]

## 2021-08-24 NOTE — PROGRESS NOTES
[facility]    Medicine Progress Note - Hospitalist Service       Date of Admission:  6/11/2021    Assessment & Plan                          Summary:     Dayron Neri is a 70 year male with h/o HTN who presented to ED on 5/15/2021 with acute SAH after a fall with unresponsiveness.  He underwent emergent left external ventricular drain placement.  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  On 5/28/2021, patient had persistent cerebral vasospasm, CT head showed bilateral BAYLEE infarct and was treated with milrinone and verapamil.  On 6/1/2021, angiogram showed no evidence of vasospasm.  Extubated on 6/3/2021.  6/6/2021 he was reintubated due to hypoxia.  On 6/7/2021, patient underwent tracheostomy and PEG tube placement.  EVD removed 6/9/2021.   Patient was treated for possible pneumonia with Unasyn on 5/20/2021, It was switched to ceftriaxone the next day for sputum culture growing Klebsiella. On 5/23, sputum culture also grew Pseudomonas. Antibiotic was switched to Zosyn.  Due to increased WBCs in CSF, possibility of ventriculitis was raised. CSF culture was negative. Antibiotic was switched to cefepime and vancomycin on 5/28/2021 for 2 weeks.  Vanco was discontinued on 6/9/2021.  On 6/5/2021, sputum grew ESBL so cefepime was switched to meropenem. He was transferred to [facility] on 6/11/21.      Repeat sputum culture 6/19 showed MDR pseudomonas and he was started on tobramycin nebs from 6/24/2021 - 7/9/2021 .  7/8/2021 aspirated tube feeds. Spiked a fever that evening 102.8 and WBC went up to 36.9 the next morning so ID reconsulted and was started on ceftazadime-avibactam along with flagyl 7/9/2021 - 7/18/21. He has had repeated bouts of Klebsiella infection in sputum and ventilator associated pneumonia.  Additionally he has had a large amount of tracheal secretions, significant cognitive impairment, left-sided neglect and ongoing episodes  of agitation.  Patient's family has been monitoring the patient continuously during the daytime hours via video monitor and visit with him in person over the weekends.     8/24 :   Clinically stable  Calcium remains slightly high, improved today 10.4, previous 10.9 with BUN mildly increased to 46, Creatinine 1.00, Na+145. Nephrology following. Off lasix currently. Free water flushes increased to 300 ml every 3 hours  Respiratory status - Phase 2 Wean - FiO2 21%, 20 liters/minute. PMV during the day.    On Duo-neb, four times a day sodium chloride neb two times a day for  pulmonary hygiene.    Still having significant secretions, Frequent suctioning 4-6 x/shift. More often during the night. On scopolamine patch  Barriers to discharge : still needing high flow oxygen, frequent suctioning and needing mitt restraints      Assessment & Plan     Acute on chronic hypoxemic respiratory failure:    S/p treatment pseudomonas ESBL and klebsiella pneumonia.           - excessive thick white secretions is the main barrier to decannulation           - glycopyrolate was d/thang earlier this month per pulmonary medicine   - PVR during the day and patient is tolerating. Trach dome and cuff deflated at night. Weaning has been slow and the biggest barrier for decannulation is excessive secretions  - Continue to wean per RT and pulmonary medicine   - Continue  duonebs, acetylcysteine, and 3% saline nebs   - Continue Scopolamine (started 7/20/2021).  Pulmonology not recommending increase in Scopolamine patch as this may increase anticholinergic effect.       Ventilator associated pneumonia: Completed antibiotics on July 18.     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 sputum culture 6/19 showed MDR pseudomonas. completed Tobramycin nebs 6/24/2021 - 7/9/2021.   - Continue pulmonary toilet, scheduled duonebs.      Traumatic brain injury/intracranial hemorrage: 6/14/21 CT head  "done for fatigue and read as slight increase in caliber of lateral and third ventricle with possibility of developing communicating hydrocephalus. Discussed with Dr. Casillas (neurosurgeon at [facility]), who did not think there was much change. CT head repeated on 6/16/21, which did not show any change.  - Need follow up with Dr. Martinez with Neurosurgery in 3 months with repeat CTA of head and neck for post-op f/u. Next CTA of head and neck mid September      Dysphagia:   -Patient failed the blue dye test 7/20/21 with immediate aspiration, continue to use tube feeds.  Did pass the blue dye test a week later but still high risk for aspiration.    - Speech therapy following. See note from 8/17/21. Per speech therapist, despite several weeks of working with patient there has been \"no functional improvement in patient's ability to manage his oropharyngeal secretions.\" Speech discussed this with wife. Intensive dysphagia treatment has been discontinued and speech therapy is still continuing to follow   - Still having significant secretions, keep NPO with tube feeds due to concerns with aspiration.      Acute metabolic encephalopathy:  - Continue Ritalin and effexor  - Patient on Effexor for agitation, increased on 8/18 to 50 mg three times daily      Hypercalcemia   - Calcium has been high normal since early July averaging 10.4-10.9. Ca+ 10.4 today, slightly improved. Ionized calcium 1.4-1.45 on 8/11/21, Vitamin D level 30, PTH 26   - Nephrology following   - likely secondary to prolonged hospitalization/immobilization and CKD III based on 24 hour clearance estimating GFR 45  - Tube feeds were adjusted to 800 mg less of calcium per day    Elevated BUN  - 46 yesterday, 44 today, likely related to CKD, volume deficit, on furosemide the last week which was stopped 8/23 by nephrology  - Free water flushes increased to 300 ml every 3 hours       Hypernatremia   - Na+ 150 on 8/16/21, Normal saline flushes via tube feed stopped. D5 " infusion and lasix given last            week. Na+ stable this week, 142 today     Malnutrition:    - Level of malnutrition: Severe   - Based on: moderate/severe subcutaneous fat loss, moderate/severe muscle loss  - continuous tube feeding. Dietician miguel portillo      Anemia: hemoglobin stable 10.8. Continue to monitor.      Essential hypertension: BP controlled. On lisinopril     Severe debility, weakness, critical illness, deconditioning, Continue PT/OT     Insomnia: is on Ritalin which was decreased to once daily due to insomnia when taken later in day.    - Continue nighttime mirtazapine 7.5 mg scheduled.    Slow transit constipation  -Constipated last week.  This improved with milk of magnesia, bisacodyl suppository and 1 dose of MiraLAX.  Currently resolved.      Diet: Adult Formula Drip Feeding: Continuous [employer] Peptide 1.5; Gastrostomy; Goal Rate:  90; mL/hr; Medication - Feeding Tube Flush Frequency: At least 15-30 mL water before and after  medication administration and with tube clogging; Amount to Send ...     DVT Prophylaxis: Heparin SQ   Gutierrez Catheter: Not present   Central Lines: None   Code Status: Full Code         Disposition Plan   Expected discharge: to be determined. Barriers: bilateral hand mitts and frequent trach suctioning          Total floor/unit time spent was 25 minutes in reviewing the record, medications, lab results and completing documentation. 15 minutes spent in counseling and discussion with patient regarding diagnosis, medications and treatment plan. Care discussed and coordinated with patient and nurse.     [facility]  Hospitalist Service  [facility]  Securely message with the Vocera Web Console (learn more here)  Text page via [employer] Paging/Directory                 ___________________________________________________________________    Interval History   Spoke with nurse and respiratory therapist this morning. Not much change compared to yesterday. Mod to large  "amount of thick white/pale yellow secretions, suctioned x 5 overnight. Patient requires less suctioning during the day. Still confused with periods of agitation. When hand mitts off patient is touching and picking at trach site. Tolerating being up in the chair during the day. Patient is in bed this morning, RT present who just suctioned the patient and giving patient a nebulizer. He is slightly agitated and is gesturing with his right hand to remove the mitt. He is mouthing words which are partially understandable. He says \"no\" when I ask him if he is in pain or if his shoulders hurt.     Review of Systems: 10 point review of systems negative except for pertinent positives mentioned in HPI      Data reviewed today: I reviewed all medications, new labs and imaging results over the last 24 hours. I personally reviewed no images or EKG's today.    Physical Exam   Vital Signs: Temp: 98.3  F (36.8  C) Temp src: Axillary BP: 122/76 Pulse: 88   Resp: 23 SpO2: 98 % O2 Device: Trach dome Oxygen Delivery: 20 LPM  Weight: 153 lbs 7 oz  General Appearance:  Alert, in bed, slightly agitated, gesturing with right hand, no apparent distress   Respiratory: coarse anterior breath sounds, no wheezing or crackles, tracheostomy patent with no visible secretions, PMV in place, surrounding skin around trach covered with gauze, no bleeding around trach site   Cardiovascular: S1,S2, rhythm rate regular, negative murmur   GI: Bowel sounds positive x 4, non distended non tender, G/J tube appears patent with tube feeding infusing, surrounding skin dry/intact  Skin: pink dry and intact  Neurological: alert and oriented x 1, smiles, whispers/mouths words when speaking valve in,  partially understandable, moving bilaterally upper extremities, right greater than left with chronic left sided neglet, inconsistent following commands     Data   Recent Labs   Lab 08/24/21  0552 08/23/21  0653 08/20/21  0611   WBC  --  8.1  --    HGB  --  10.8*  --  "   MCV  --  99  --    PLT  --  377  --     145 144   POTASSIUM 4.6 4.5 4.6   CHLORIDE 103 105 104   CO2 28 28 28   BUN 44* 46* 39*   CR 0.88 1.00 0.88   ANIONGAP 11 12 12   RAGINI 10.4 10.9* 10.8*    123 120     Medications     dextrose       sodium chloride         acetylcysteine  2 mL Nebulization BID     famotidine  20 mg Oral or Feeding Tube BID     heparin ANTICOAGULANT  5,000 Units Subcutaneous Q8H     ipratropium - albuterol 0.5 mg/2.5 mg/3 mL  3 mL Nebulization BID     lisinopril  40 mg Oral or Feeding Tube Daily     menthol-zinc oxide   Topical TID     methylphenidate  10 mg Oral or Feeding Tube QAM AC     mirtazapine  7.5 mg Per Feeding Tube At Bedtime     scopolamine  1 patch Transdermal Q72H    And     scopolamine   Transdermal Q8H     sodium chloride  3 mL Nebulization BID     sodium chloride (PF)  3 mL Intracatheter Q8H     venlafaxine  50 mg Per Feeding Tube TID w/meals     Restraint:     Patient pulls his IV line and trach.      Within an hour after restraint an in person face to face assessment was completed at 8:00 AM, including an evaluation of the patient's immediate reaction to the intervention, behavioral assessment and review/assessment of history, drugs and medications, recent labs, etc., and behavioral condition.  The patient experienced no adverse physical outcome from seclusion/restraint initiation.  The intervention of restraint or seclusion needs to continue

## 2021-08-25 ENCOUNTER — APPOINTMENT (OUTPATIENT)
Dept: OCCUPATIONAL THERAPY | Facility: CLINIC | Age: 71
DRG: 207 | End: 2021-08-25
Attending: HOSPITALIST
Payer: COMMERCIAL

## 2021-08-25 ENCOUNTER — APPOINTMENT (OUTPATIENT)
Dept: SPEECH THERAPY | Facility: CLINIC | Age: 71
DRG: 207 | End: 2021-08-25
Attending: HOSPITALIST
Payer: COMMERCIAL

## 2021-08-25 ENCOUNTER — APPOINTMENT (OUTPATIENT)
Dept: PHYSICAL THERAPY | Facility: CLINIC | Age: 71
DRG: 207 | End: 2021-08-25
Attending: HOSPITALIST
Payer: COMMERCIAL

## 2021-08-25 PROBLEM — N18.31 STAGE 3A CHRONIC KIDNEY DISEASE (H): Status: ACTIVE | Noted: 2021-08-25

## 2021-08-25 PROCEDURE — 97535 SELF CARE MNGMENT TRAINING: CPT | Mod: GO | Performed by: OCCUPATIONAL THERAPIST

## 2021-08-25 PROCEDURE — 97530 THERAPEUTIC ACTIVITIES: CPT | Mod: GO | Performed by: OCCUPATIONAL THERAPIST

## 2021-08-25 PROCEDURE — 94640 AIRWAY INHALATION TREATMENT: CPT

## 2021-08-25 PROCEDURE — 250N000013 HC RX MED GY IP 250 OP 250 PS 637: Performed by: INTERNAL MEDICINE

## 2021-08-25 PROCEDURE — 250N000009 HC RX 250: Performed by: INTERNAL MEDICINE

## 2021-08-25 PROCEDURE — 250N000013 HC RX MED GY IP 250 OP 250 PS 637

## 2021-08-25 PROCEDURE — 250N000011 HC RX IP 250 OP 636

## 2021-08-25 PROCEDURE — 999N000009 HC STATISTIC AIRWAY CARE

## 2021-08-25 PROCEDURE — 120N000017 HC R&B RESPIRATORY CARE

## 2021-08-25 PROCEDURE — 250N000013 HC RX MED GY IP 250 OP 250 PS 637: Performed by: NURSE PRACTITIONER

## 2021-08-25 PROCEDURE — 94668 MNPJ CHEST WALL SBSQ: CPT

## 2021-08-25 PROCEDURE — 94660 CPAP INITIATION&MGMT: CPT

## 2021-08-25 PROCEDURE — 97530 THERAPEUTIC ACTIVITIES: CPT | Mod: GP | Performed by: PHYSICAL THERAPIST

## 2021-08-25 PROCEDURE — 250N000013 HC RX MED GY IP 250 OP 250 PS 637: Performed by: HOSPITALIST

## 2021-08-25 PROCEDURE — 97110 THERAPEUTIC EXERCISES: CPT | Mod: GO | Performed by: OCCUPATIONAL THERAPIST

## 2021-08-25 PROCEDURE — 99233 SBSQ HOSP IP/OBS HIGH 50: CPT | Performed by: INTERNAL MEDICINE

## 2021-08-25 PROCEDURE — 99207 PR CDG-CUT & PASTE-POTENTIAL IMPACT ON LEVEL: CPT | Performed by: INTERNAL MEDICINE

## 2021-08-25 PROCEDURE — 999N000157 HC STATISTIC RCP TIME EA 10 MIN

## 2021-08-25 PROCEDURE — 99232 SBSQ HOSP IP/OBS MODERATE 35: CPT | Performed by: PHYSICIAN ASSISTANT

## 2021-08-25 PROCEDURE — 94640 AIRWAY INHALATION TREATMENT: CPT | Mod: 76

## 2021-08-25 PROCEDURE — 999N000123 HC STATISTIC OXYGEN O2DAILY TECH TIME

## 2021-08-25 PROCEDURE — 92507 TX SP LANG VOICE COMM INDIV: CPT | Mod: GN

## 2021-08-25 RX ADMIN — VENLAFAXINE 50 MG: 25 TABLET ORAL at 16:45

## 2021-08-25 RX ADMIN — FAMOTIDINE 20 MG: 20 TABLET, FILM COATED ORAL at 10:01

## 2021-08-25 RX ADMIN — ACETYLCYSTEINE 2 ML: 200 SOLUTION ORAL; RESPIRATORY (INHALATION) at 07:40

## 2021-08-25 RX ADMIN — SCOPALAMINE 1 PATCH: 1 PATCH, EXTENDED RELEASE TRANSDERMAL at 12:14

## 2021-08-25 RX ADMIN — SODIUM CHLORIDE 30 MG/ML INHALATION SOLUTION 3 ML: 30 SOLUTION INHALANT at 07:53

## 2021-08-25 RX ADMIN — MIRTAZAPINE 7.5 MG: 7.5 TABLET ORAL at 21:01

## 2021-08-25 RX ADMIN — VENLAFAXINE 50 MG: 25 TABLET ORAL at 10:01

## 2021-08-25 RX ADMIN — IPRATROPIUM BROMIDE AND ALBUTEROL SULFATE 3 ML: 2.5; .5 SOLUTION RESPIRATORY (INHALATION) at 19:26

## 2021-08-25 RX ADMIN — LISINOPRIL 40 MG: 20 TABLET ORAL at 10:00

## 2021-08-25 RX ADMIN — HEPARIN SODIUM 5000 UNITS: 5000 INJECTION, SOLUTION INTRAVENOUS; SUBCUTANEOUS at 14:42

## 2021-08-25 RX ADMIN — FAMOTIDINE 20 MG: 20 TABLET, FILM COATED ORAL at 21:02

## 2021-08-25 RX ADMIN — METHYLPHENIDATE HYDROCHLORIDE 10 MG: 10 TABLET ORAL at 06:29

## 2021-08-25 RX ADMIN — HEPARIN SODIUM 5000 UNITS: 5000 INJECTION, SOLUTION INTRAVENOUS; SUBCUTANEOUS at 21:04

## 2021-08-25 RX ADMIN — ACETYLCYSTEINE 2 ML: 200 SOLUTION ORAL; RESPIRATORY (INHALATION) at 19:27

## 2021-08-25 RX ADMIN — VENLAFAXINE 50 MG: 25 TABLET ORAL at 12:10

## 2021-08-25 RX ADMIN — ANORECTAL OINTMENT: 15.7; .44; 24; 20.6 OINTMENT TOPICAL at 14:43

## 2021-08-25 RX ADMIN — IPRATROPIUM BROMIDE AND ALBUTEROL SULFATE 3 ML: 2.5; .5 SOLUTION RESPIRATORY (INHALATION) at 07:40

## 2021-08-25 RX ADMIN — HEPARIN SODIUM 5000 UNITS: 5000 INJECTION, SOLUTION INTRAVENOUS; SUBCUTANEOUS at 06:29

## 2021-08-25 RX ADMIN — ANORECTAL OINTMENT: 15.7; .44; 24; 20.6 OINTMENT TOPICAL at 10:01

## 2021-08-25 RX ADMIN — ANORECTAL OINTMENT: 15.7; .44; 24; 20.6 OINTMENT TOPICAL at 21:03

## 2021-08-25 RX ADMIN — SODIUM CHLORIDE 30 MG/ML INHALATION SOLUTION 3 ML: 30 SOLUTION INHALANT at 19:27

## 2021-08-25 ASSESSMENT — MIFFLIN-ST. JEOR: SCORE: 1531.23

## 2021-08-25 NOTE — PLAN OF CARE
Respiratory Care Progress Note    Airway:    Current settings: 20L, 21% FiO2  Trach type/size: #6 Bivona placed on 7/28/2021    Action:    Therapies: Duoneb BID, Mucomyst BID, 3% saline BID, Metaneb  Overnight suctioning   Frequency: 5x   Amount: copious   Consistency: thick   Color: cloudy   Spontaneous effort: moderate    Weaning phase: 2  Wean mode: PMV with cuff deflated   Wean time: 0840-2000  End wean reason: PMV removed & cuff inflated for overnight settings    Response    Breath sounds: diminished, occasional rhonchi  Vital signs: SpO2 98%, HR 81, RR 22  Emotional needs/concerns: none  Risk for self decannulation: yes   Risk due to: confusion   Intervention(s) in place: mittens on bilateral hands    Note/Plan: continue current plan of care

## 2021-08-25 NOTE — PROGRESS NOTES
RENAL PROGRESS NOTE    CC:  Hypercalcemia     ASSESSMENT & PLAN:   70 year old male with past medical history of hypertension, admitted with subarachnoid hemorrhage after a fall on 5/15/2021 s/p emergent left external ventricular drain placement with posterior communicating artery aneurysm defied structured underwent craniotomy with clipping of aneurysm , complicated by persistent cerebral vasospasms and bilateral BAYLEE infarct.  Course also complicated by respiratory failure with intubation pneumonia was treated with cefepime and Vanco, transferred to LT[facility] on 6/11/2021.  Nephrology consulted for hypercalcemia on 8/15/2021     Hypercalcemia  Likely CKD III based on 24 hour cr clearance   Calcium has been high normal since early July 1 noted 7/5/2021 @ 10.6, ionized calcium 1.4  Was progressively worsening to 11s, with ionized calcium of 1.4-1.45  PTH 26 on 8/11/2021, vitamin D level 30 8/15/2021  Has been on Ritalin since mid July, calcium was already mildly elevated before that, no other supplements noted  Likely secondary to immobilization/ prolonged hospitalization, CKD  -PTH 26 (not appropriately suppressed) likely 2/2 so some underlying CKD   No history suggestive of any lymphoma or lymphadenopathy or any granulomatosis related disorders to suggest any additional etiology.  --Tube feeds were adjusted 8/15 to lower calcium content   -- Started on  ml every 2 hours enterally on 8/14/2021, then serum sodium 150 8/16/2021, stopped NS flushes in tube feeds 8/16/2021, started D5W at 100 ml/hr and dosed IV Lasix 20 mg. Sodium improved to normal and ca stalled in the 10's  --Stopped D5W on 8/17/21. Started free water flushes 200cc q4h.   --Started po Lasix 20 mg BID down feeding tube 8/17/21. Now with bump in cr 8/23/2021  --Likely now slightly volume behind. Stopped diuretics. Increased free water flushes to 300 ml q3h.   --8/24/21 cr 0.88 and ca improved to 10.4, cont the same. Recheck BMP  "8/30/21  --Discussed with Dr Pradhan      Creatinine based GFR is preserved   24 hour cr clearance estimates GFR at 45 indicating stage 3 CKD       Hypertension lisinopril 40 mg    euvolemic   Likely elevated insensible losses with vent and tachypnea  --Blood pressure stable      Anemia  Inflammatory  Mild management per hospitalist medicine     Acute on chronic hypoxemic respiratory failure  S/p trach  ESBL Pseudomonas and Klebsiella pneumonia  Had tobramycin nebs for MDR Pseudomonas  Pulmonary following  Currently off antibiotics     Malnutrition on tube feed, adjusted for lower calcium      SAH w fall sp craniotomy and hematoma evacuation , PCA aneurysm clip  cb BAYLEE stroke ad vasopasm  No on rehab     Insomnia/mood disorders  On Ritalin and mirtazapine  Management per primary medicine     Metabolic encephalopathy stable    SUBJECTIVE:  Condom cath fell off, patient was incontinent  Diaper was heavy with urine   I & O not accurate   Weight appears to be up, however   RN plans to re weigh later after re zeroing the bed  Staff are currently getting pt out of bed and into chair  I asked patient how he is doing, and right away he said \"Good\"   Denies any increased difficulty with breathing    Discussed with RN in room       OBJECTIVE:  Physical Exam   Temp: 98.5  F (36.9  C) Temp src: Axillary BP: 109/69 Pulse: 83   Resp: 22 SpO2: 94 % O2 Device: Trach dome Oxygen Delivery: 20 LPM  Vitals:    08/23/21 0700 08/24/21 0538 08/25/21 0815   Weight: 70.9 kg (156 lb 6.4 oz) 69.6 kg (153 lb 7 oz) 73.9 kg (163 lb)     Vital Signs with Ranges  Temp:  [97.8  F (36.6  C)-98.5  F (36.9  C)] 98.5  F (36.9  C)  Pulse:  [79-88] 83  Resp:  [20-22] 22  BP: (109-129)/(69-84) 109/69  FiO2 (%):  [21 %] 21 %  SpO2:  [93 %-99 %] 94 %  I/O last 3 completed shifts:  In: 4331 [NG/GT:4331]  Out: 150 [Urine:150]      Patient Vitals for the past 72 hrs:   Weight   08/25/21 0815 73.9 kg (163 lb)   08/24/21 0538 69.6 kg (153 lb 7 oz)   08/23/21 0700 " 70.9 kg (156 lb 6.4 oz)       Intake/Output Summary (Last 24 hours) at 8/16/2021 1118  Last data filed at 8/16/2021 1000  Gross per 24 hour   Intake 4214 ml   Output 1150 ml   Net 3064 ml       PHYSICAL EXAM:  General: Awake, NAD, laying in bed  HENT: Supple, Non-tender, no obvious JVD  Eyes: No scleral icterus  Cardiovascular: RRR, no rub, gallop, or murmur. No edema.  Respiratory: Non-labored, trach +  Gastrointestinal: Soft, non-tender, non-distended  Integumentary: Warm, dry, no rash  Neurologic: Grossly intact     LABORATORY STUDIES:     Recent Labs   Lab 08/23/21  0653   WBC 8.1   RBC 3.33*   HGB 10.8*   HCT 33.0*          Basic Metabolic Panel:  Recent Labs   Lab 08/24/21  0552 08/23/21  0653 08/20/21  0611    145 144   POTASSIUM 4.6 4.5 4.6   CHLORIDE 103 105 104   CO2 28 28 28   BUN 44* 46* 39*   CR 0.88 1.00 0.88    123 120   RAGINI 10.4 10.9* 10.8*       INRNo lab results found in last 7 days.     Recent Labs   Lab Test 08/23/21  0653 08/16/21  0649 05/16/21  0405 05/15/21  2125 05/15/21  1222   INR  --   --   --  1.20* 1.16*   WBC 8.1 6.7   < > 12.3* 16.5*   HGB 10.8* 9.6*   < > 11.8* 13.6    377   < > 239 257    < > = values in this interval not displayed.       Personally reviewed current labs    Elpidio Rowley PA-C  [facility]  [phone]

## 2021-08-25 NOTE — PLAN OF CARE
"  Problem: Device-Related Complication Risk (Artificial Airway)  Goal: Optimal Device Function  Outcome: No Change     Problem: Skin and Tissue Injury (Artificial Airway)  Goal: Absence of Device-Related Skin or Tissue Injury  Outcome: No Change     Problem: Communication Impairment (Artificial Airway)  Goal: Effective Communication  Outcome: Improving   RT PROGRESS NOTE     DATA:     CURRENT SETTINGS:             TRACH TYPE / SIZE:  #6 bivona changed 7/28             MODE:   20L and 21% tm with cuff deflated                 ACTION:             THERAPIES:   albuterol bid, mucomyst bid, saline bid and metaneb bid             SUCTION:                           FREQUENCY:   x5                        AMOUNT:   small to copious this afternoon                        CONSISTENCY:   thick to thin                        COLOR:   pale yellow             SPONTANEOUS COUGH EFFORT/STRENGTH OF EFFORT (not elicited by suctioning): moderate                              WEANING PHASE:   2                        WEAN MODE:    pmv                        WEAN TIME:   started at 08:00                           RESPONSE:             BS:   coarse with fine crackles in the bases             VITAL SIGNS:   Blood pressure 123/71, pulse 85, temperature 98.6  F (37  C), temperature source Oral, resp. rate 22, height 1.803 m (5' 10.98\"), weight 73.9 kg (163 lb), SpO2 92 %.                 EMOTIONAL NEEDS / CONCERNS:  no                RISK FOR SELF DECANNULATION:  yes                        RISK DUE TO:  Confusion/impulsive                        INTERVENTION/S IN PLACE IS/ARE:  Bilateral mitts       NOTE / PLAN:   Continue pulmonary toilet.    "

## 2021-08-25 NOTE — PLAN OF CARE
Pt. Is alert and can communicate with yes or no when speaking valve. He was up in chair this morning. Pt's wife would like him to get up in chair again tonight. The condom cath was removed, due to it continuing to fall off. A brief is now on.     Problem: Adult Inpatient Plan of Care  Goal: Plan of Care Review  Outcome: Improving  Flowsheets  Taken 8/25/2021 1513  Plan of Care Reviewed With:   spouse   patient  Progress: improving  Taken 8/1/2021 2195  Outcome Summary: (Patient was able to speak understandable and make some of his wants known.) Patient was able to speak understandable and make some of his wants known.     Problem: Adult Inpatient Plan of Care  Goal: Optimal Comfort and Wellbeing  Outcome: Improving  Intervention: Provide Person-Centered Care  Recent Flowsheet Documentation  Taken 8/25/2021 1000 by Li Tony, RN  Trust Relationship/Rapport:   care explained   emotional support provided   questions answered     Problem: Adult Inpatient Plan of Care  Goal: Absence of Hospital-Acquired Illness or Injury  Intervention: Prevent Skin Injury    Turned every two hours

## 2021-08-25 NOTE — PROGRESS NOTES
[facility]    Medicine Progress Note - Hospitalist Service       Date of Admission:  6/11/2021    Assessment & Plan          Summary:     Dayron Neri is a 70 year male with h/o HTN who presented to ED on 5/15/2021 with acute SAH after a fall with unresponsiveness.  He underwent emergent left external ventricular drain placement.  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  On 5/28/2021, patient had persistent cerebral vasospasm, CT head showed bilateral BAYLEE infarct and was treated with milrinone and verapamil.  On 6/1/2021, angiogram showed no evidence of vasospasm.  Extubated on 6/3/2021.  6/6/2021 he was reintubated due to hypoxia.  On 6/7/2021, patient underwent tracheostomy and PEG tube placement.  EVD removed 6/9/2021.   Patient was treated for possible pneumonia with Unasyn on 5/20/2021, It was switched to ceftriaxone the next day for sputum culture growing Klebsiella. On 5/23, sputum culture also grew Pseudomonas. Antibiotic was switched to Zosyn.  Due to increased WBCs in CSF, possibility of ventriculitis was raised. CSF culture was negative. Antibiotic was switched to cefepime and vancomycin on 5/28/2021 for 2 weeks.  Vanco was discontinued on 6/9/2021.  On 6/5/2021, sputum grew ESBL so cefepime was switched to meropenem. He was transferred to [facility] on 6/11/21.      Repeat sputum culture 6/19 showed MDR pseudomonas and he was started on tobramycin nebs from 6/24/2021 - 7/9/2021 .  7/8/2021 aspirated tube feeds. Spiked a fever that evening 102.8 and WBC went up to 36.9 the next morning so ID reconsulted and was started on ceftazadime-avibactam along with flagyl 7/9/2021 - 7/18/21. He has had repeated bouts of Klebsiella infection in sputum and ventilator associated pneumonia.  Additionally he has had a large amount of tracheal secretions, significant cognitive impairment, left-sided neglect and ongoing episodes of  agitation.  Patient's family has been monitoring the patient continuously during the daytime hours via video monitor and visit with him in person over the weekends.     8/25 :   Clinically stable  Calcium has been high, improved 8/24/21 at 10.4, previous 10.9. Nephrology following. Off lasix currently. Free water flushes increased to 300 ml every 3 hours  Respiratory status - Phase 2 Wean - FiO2 21%, 20 liters/minute. PMV during the day.    On Duo-neb, four times a day sodium chloride neb two times a day for  pulmonary hygiene.    Still having significant secretions, Frequent suctioning 4-6 x/shift. More often during the night. On scopolamine patch  Barriers to discharge : still needing high flow oxygen, frequent suctioning and needing mitt restraints      Assessment & Plan     Acute on chronic hypoxemic respiratory failure:    S/p treatment pseudomonas ESBL and klebsiella pneumonia.           - excessive thick white secretions is the main barrier to decannulation           - glycopyrolate was d/thang earlier this month per pulmonary medicine   - PVR during the day and patient is tolerating. Trach dome and cuff deflated at night. Weaning has been slow and the biggest barrier for decannulation is excessive secretions  - Continue to wean per RT and pulmonary medicine   - Continue  duonebs, acetylcysteine, and 3% saline nebs   - Continue Scopolamine (started 7/20/2021).  Pulmonology not recommending increase in Scopolamine patch as this may increase anticholinergic effect.       Ventilator associated pneumonia: Completed antibiotics on July 18.     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 sputum culture 6/19 showed MDR pseudomonas. completed Tobramycin nebs 6/24/2021 - 7/9/2021.   - Continue pulmonary toilet, scheduled duonebs.      Traumatic brain injury/intracranial hemorrage: 6/14/21 CT head done for fatigue and read as slight increase in caliber of  "lateral and third ventricle with possibility of developing communicating hydrocephalus. Discussed with Dr. Casillas (neurosurgeon at [facility]), who did not think there was much change. CT head repeated on 6/16/21, which did not show any change.  - Need follow up with Dr. Martinez with Neurosurgery in 3 months with repeat CTA of head and neck for post-op f/u. Next CTA of head and neck mid September      Dysphagia:   -Patient failed the blue dye test 7/20/21 with immediate aspiration, continue to use tube feeds.  Did pass the blue dye test a week later but still high risk for aspiration.    - Speech therapy following. See note from 8/17/21. Per speech therapist, despite several weeks of working with patient there has been \"no functional improvement in patient's ability to manage his oropharyngeal secretions.\" Speech discussed this with wife. Intensive dysphagia treatment has been discontinued and speech therapy is still continuing to follow   - Still having significant secretions, keep NPO with tube feeds due to concerns with aspiration.      Acute metabolic encephalopathy:  - Continue Ritalin and effexor  - Patient on Effexor for agitation, increased on 8/18 to 50 mg three times daily      Hypercalcemia   - Calcium has been high normal since early July averaging 10.4-10.9. Ca+ 10.4 today, slightly improved. Ionized calcium 1.4-1.45 on 8/11/21, Vitamin D level 30, PTH 26   - Nephrology following   - likely secondary to prolonged hospitalization/immobilization and CKD III based on 24 hour clearance estimating GFR 45  - Tube feeds were adjusted to 800 mg less of calcium per day    Chronic kidney disease (CKD) stage 3a  - creat stable to improved    Hypernatremia  - Na+ 150 on 8/16/21, Normal saline flushes via tube feed stopped. D5 infusion and lasix given last week. Na+ stable this week     Malnutrition:    - Level of malnutrition: Severe   - Based on: moderate/severe subcutaneous fat loss, moderate/severe muscle loss  - " continuous tube feeding. Dietician following      Anemia: hemoglobin stable 10.8. Continue to monitor.      Essential hypertension: BP controlled. On lisinopril     Severe debility, weakness, critical illness, deconditioning, Continue PT/OT     Insomnia: is on Ritalin which was decreased to once daily due to insomnia when taken later in day.    - Continue nighttime mirtazapine 7.5 mg scheduled.    Slow transit constipation  -Constipated last week.  This improved with milk of magnesia, bisacodyl suppository and 1 dose of MiraLAX.  Currently resolved.      Diet: Adult Formula Drip Feeding: Continuous [employer] Peptide 1.5; Gastrostomy; Goal Rate: 90 mL/hr; Medication - Feeding Tube Flush Frequency: At least 15-30 mL water before and after medication administration and with tube clogging   DVT Prophylaxis: Heparin SQ   Gutierrez Catheter: Not present   Central Lines: None   Code Status: Full Code         Disposition Plan   Expected discharge: to be determined. Barriers: bilateral hand mitts and frequent trach suctioning    ___________________________________________________________________    Interval History   Spoke with nurse this morning. Not much change compared to yesterday. Mod to large amount of thick white/pale yellow secretions, suctioned x 5 overnight. Patient requires less suctioning during the day. Still confused with periods of agitation. When hand mitts off patient is touching and picking at trach site. Tolerating being up in the chair during the day. .     Data reviewed today: I reviewed all medications, new labs and imaging results over the last 24 hours. I personally reviewed no images or EKG's today.    Physical Exam   Vital Signs: Temp: 98.5  F (36.9  C) Temp src: Axillary BP: 116/69 Pulse: 83   Resp: 22 SpO2: 94 % O2 Device: Trach dome Oxygen Delivery: 20 LPM  Weight: 163 lbs 0 oz  General Appearance:  Alert, in chair, gesturing with right hand, no apparent  distress   Respiratory: coarse anterior breath sounds, no wheezing or crackles, tracheostomy patent with no visible secretions, PMV in place, surrounding skin around trach covered with gauze, no bleeding around trach site   Cardiovascular: S1,S2, rhythm rate regular, negative murmur   GI: Bowel sounds positive x 4, non distended non tender, G/J tube appears patent with tube feeding infusing, surrounding skin dry/intact  Skin: pink dry and intact  Neurological: alert and oriented x 1, smiles, whispers/mouths words when speaking valve in,  partially understandable, moving bilaterally upper extremities, right greater than left with chronic left sided neglet, does follow a few simple commands but is somewhat inconsistent    Data   Recent Labs   Lab 08/24/21  0552 08/23/21  0653 08/20/21  0611   WBC  --  8.1  --    HGB  --  10.8*  --    MCV  --  99  --    PLT  --  377  --     145 144   POTASSIUM 4.6 4.5 4.6   CHLORIDE 103 105 104   CO2 28 28 28   BUN 44* 46* 39*   CR 0.88 1.00 0.88   ANIONGAP 11 12 12   RAGINI 10.4 10.9* 10.8*    123 120     Medications     dextrose       sodium chloride         acetylcysteine  2 mL Nebulization BID     famotidine  20 mg Oral or Feeding Tube BID     heparin ANTICOAGULANT  5,000 Units Subcutaneous Q8H     ipratropium - albuterol 0.5 mg/2.5 mg/3 mL  3 mL Nebulization BID     lisinopril  40 mg Oral or Feeding Tube Daily     menthol-zinc oxide   Topical TID     methylphenidate  10 mg Oral or Feeding Tube QAM AC     mirtazapine  7.5 mg Per Feeding Tube At Bedtime     scopolamine  1 patch Transdermal Q72H    And     scopolamine   Transdermal Q8H     sodium chloride  3 mL Nebulization BID     sodium chloride (PF)  3 mL Intracatheter Q8H     venlafaxine  50 mg Per Feeding Tube TID w/meals     Restraint:     Patient pulls his IV line and trach.      Within an hour after restraint an in person face to face assessment was completed at 8:00 AM, including an evaluation of the patient's  immediate reaction to the intervention, behavioral assessment and review/assessment of history, drugs and medications, recent labs, etc., and behavioral condition.  The patient experienced no adverse physical outcome from seclusion/restraint initiation.  The intervention of restraint or seclusion needs to continue

## 2021-08-25 NOTE — PROGRESS NOTES
Social Work Note:  Patient chart reviewed.  Patient discussed in morning rounds.  Barriers to discharge: mitts x2.  Trach.  Frequent suction x5 (copious/thick).  Trach change 08/28.    Patient continues to be on mitts x2, freq suction.  Patient and family being followed by/supported by Palliative care team.    Patient's exact discharge date, time, disposition TBD.  SW will continue to follow for psychosocial and emotional support of patient and family. SW to facilitate discharge to TCU when pt no longer requires LTACH level of care.  Consulted with Palliative Care SW.     Dayron Chen, [facility] HospitalMARY KATE  [facility]/[place]  [phone]

## 2021-08-26 ENCOUNTER — APPOINTMENT (OUTPATIENT)
Dept: SPEECH THERAPY | Facility: CLINIC | Age: 71
DRG: 207 | End: 2021-08-26
Attending: HOSPITALIST
Payer: COMMERCIAL

## 2021-08-26 ENCOUNTER — APPOINTMENT (OUTPATIENT)
Dept: PHYSICAL THERAPY | Facility: CLINIC | Age: 71
DRG: 207 | End: 2021-08-26
Attending: HOSPITALIST
Payer: COMMERCIAL

## 2021-08-26 ENCOUNTER — APPOINTMENT (OUTPATIENT)
Dept: OCCUPATIONAL THERAPY | Facility: CLINIC | Age: 71
DRG: 207 | End: 2021-08-26
Attending: HOSPITALIST
Payer: COMMERCIAL

## 2021-08-26 PROCEDURE — 97530 THERAPEUTIC ACTIVITIES: CPT | Mod: GP | Performed by: PHYSICAL THERAPIST

## 2021-08-26 PROCEDURE — 97112 NEUROMUSCULAR REEDUCATION: CPT | Mod: GP | Performed by: PHYSICAL THERAPIST

## 2021-08-26 PROCEDURE — 999N000123 HC STATISTIC OXYGEN O2DAILY TECH TIME

## 2021-08-26 PROCEDURE — 99233 SBSQ HOSP IP/OBS HIGH 50: CPT | Performed by: INTERNAL MEDICINE

## 2021-08-26 PROCEDURE — 999N000009 HC STATISTIC AIRWAY CARE

## 2021-08-26 PROCEDURE — 999N000157 HC STATISTIC RCP TIME EA 10 MIN

## 2021-08-26 PROCEDURE — 250N000013 HC RX MED GY IP 250 OP 250 PS 637: Performed by: HOSPITALIST

## 2021-08-26 PROCEDURE — 97129 THER IVNTJ 1ST 15 MIN: CPT | Mod: GO | Performed by: OCCUPATIONAL THERAPIST

## 2021-08-26 PROCEDURE — 99232 SBSQ HOSP IP/OBS MODERATE 35: CPT | Performed by: PHYSICIAN ASSISTANT

## 2021-08-26 PROCEDURE — 92507 TX SP LANG VOICE COMM INDIV: CPT | Mod: GN | Performed by: SPEECH-LANGUAGE PATHOLOGIST

## 2021-08-26 PROCEDURE — 250N000013 HC RX MED GY IP 250 OP 250 PS 637: Performed by: NURSE PRACTITIONER

## 2021-08-26 PROCEDURE — 120N000017 HC R&B RESPIRATORY CARE

## 2021-08-26 PROCEDURE — 94640 AIRWAY INHALATION TREATMENT: CPT

## 2021-08-26 PROCEDURE — 999N000197 HC STATISTIC WOC PT EDUCATION, 0-15 MIN

## 2021-08-26 PROCEDURE — 94660 CPAP INITIATION&MGMT: CPT

## 2021-08-26 PROCEDURE — 94668 MNPJ CHEST WALL SBSQ: CPT

## 2021-08-26 PROCEDURE — 94664 DEMO&/EVAL PT USE INHALER: CPT

## 2021-08-26 PROCEDURE — 250N000011 HC RX IP 250 OP 636

## 2021-08-26 PROCEDURE — 250N000013 HC RX MED GY IP 250 OP 250 PS 637

## 2021-08-26 PROCEDURE — 94003 VENT MGMT INPAT SUBQ DAY: CPT | Performed by: INTERNAL MEDICINE

## 2021-08-26 PROCEDURE — 250N000009 HC RX 250: Performed by: INTERNAL MEDICINE

## 2021-08-26 PROCEDURE — 97110 THERAPEUTIC EXERCISES: CPT | Mod: GO | Performed by: OCCUPATIONAL THERAPIST

## 2021-08-26 PROCEDURE — 94640 AIRWAY INHALATION TREATMENT: CPT | Mod: 76

## 2021-08-26 PROCEDURE — 250N000013 HC RX MED GY IP 250 OP 250 PS 637: Performed by: INTERNAL MEDICINE

## 2021-08-26 RX ORDER — MODAFINIL 100 MG/1
100 TABLET ORAL DAILY
Status: DISCONTINUED | OUTPATIENT
Start: 2021-08-27 | End: 2021-08-27

## 2021-08-26 RX ADMIN — ACETYLCYSTEINE 2 ML: 200 SOLUTION ORAL; RESPIRATORY (INHALATION) at 08:31

## 2021-08-26 RX ADMIN — VENLAFAXINE 50 MG: 25 TABLET ORAL at 08:09

## 2021-08-26 RX ADMIN — VENLAFAXINE 50 MG: 25 TABLET ORAL at 17:36

## 2021-08-26 RX ADMIN — FAMOTIDINE 20 MG: 20 TABLET, FILM COATED ORAL at 21:09

## 2021-08-26 RX ADMIN — ACETYLCYSTEINE 2 ML: 200 SOLUTION ORAL; RESPIRATORY (INHALATION) at 20:38

## 2021-08-26 RX ADMIN — ANORECTAL OINTMENT: 15.7; .44; 24; 20.6 OINTMENT TOPICAL at 11:28

## 2021-08-26 RX ADMIN — IPRATROPIUM BROMIDE AND ALBUTEROL SULFATE 3 ML: 2.5; .5 SOLUTION RESPIRATORY (INHALATION) at 08:30

## 2021-08-26 RX ADMIN — SODIUM CHLORIDE 30 MG/ML INHALATION SOLUTION 3 ML: 30 SOLUTION INHALANT at 08:32

## 2021-08-26 RX ADMIN — HEPARIN SODIUM 5000 UNITS: 5000 INJECTION, SOLUTION INTRAVENOUS; SUBCUTANEOUS at 21:09

## 2021-08-26 RX ADMIN — FAMOTIDINE 20 MG: 20 TABLET, FILM COATED ORAL at 08:09

## 2021-08-26 RX ADMIN — LISINOPRIL 40 MG: 20 TABLET ORAL at 08:09

## 2021-08-26 RX ADMIN — MIRTAZAPINE 7.5 MG: 7.5 TABLET ORAL at 21:09

## 2021-08-26 RX ADMIN — ANORECTAL OINTMENT: 15.7; .44; 24; 20.6 OINTMENT TOPICAL at 14:44

## 2021-08-26 RX ADMIN — VENLAFAXINE 50 MG: 25 TABLET ORAL at 11:29

## 2021-08-26 RX ADMIN — HEPARIN SODIUM 5000 UNITS: 5000 INJECTION, SOLUTION INTRAVENOUS; SUBCUTANEOUS at 14:38

## 2021-08-26 RX ADMIN — METHYLPHENIDATE HYDROCHLORIDE 10 MG: 10 TABLET ORAL at 06:25

## 2021-08-26 RX ADMIN — ANORECTAL OINTMENT: 15.7; .44; 24; 20.6 OINTMENT TOPICAL at 21:10

## 2021-08-26 RX ADMIN — SODIUM CHLORIDE 30 MG/ML INHALATION SOLUTION 3 ML: 30 SOLUTION INHALANT at 21:44

## 2021-08-26 RX ADMIN — IPRATROPIUM BROMIDE AND ALBUTEROL SULFATE 3 ML: 2.5; .5 SOLUTION RESPIRATORY (INHALATION) at 20:37

## 2021-08-26 RX ADMIN — HEPARIN SODIUM 5000 UNITS: 5000 INJECTION, SOLUTION INTRAVENOUS; SUBCUTANEOUS at 06:25

## 2021-08-26 ASSESSMENT — MIFFLIN-ST. JEOR: SCORE: 1507.65

## 2021-08-26 NOTE — PLAN OF CARE
Problem: Adult Inpatient Plan of Care  Goal: Absence of Hospital-Acquired Illness or Injury  Intervention: Prevent Infection  Recent Flowsheet Documentation  Taken 8/25/2021 2233 by Amanda Jean RN  Infection Prevention: hand hygiene promoted   Patient was cooperative with all cares. Contact precautions continued and hand hygiene promoted. Patient denied pain

## 2021-08-26 NOTE — PLAN OF CARE
Problem: Device-Related Complication Risk (Artificial Airway)  Goal: Optimal Device Function  Outcome: Improving  Intervention: Optimize Device Care and Function  Recent Flowsheet Documentation  Taken 8/25/2021 1927 by Timoteo Tran RT  Airway Safety Measures:    all equipment/monitors on and audible    manual resuscitator at bedside    suction at bedside    suction equipment    oxygen flowmeter     Problem: Skin and Tissue Injury (Artificial Airway)  Goal: Absence of Device-Related Skin or Tissue Injury  Outcome: Improving     Problem: Communication Impairment (Artificial Airway)  Goal: Effective Communication  Outcome: Improving  RT PROGRESS NOTE     DATA:     CURRENT SETTINGS:             TRACH TYPE / SIZE: #6 [place] (Cuff down, placed 07/28)             MODE:  TM/ PMV             FIO2:   21%, 20 L     ACTION:             THERAPIES: Duo-neb/Mucomyst/Saline/MetaNeb BID             SUCTION:  Yes                          FREQUENCY:x 6                      AMOUNT:   Moderate to Large                        CONSISTENCY: Thick                        COLOR:   Pale yellow             SPONTANEOUS COUGH EFFORT/STRENGTH OF EFFORT (not elicited by suctioning): Strong productive cough.                              WEANING PHASE:   2                        WEAN MODE:  TM with PMV                        WEAN TIME:  started at 9:05minutes                        END WEAN REASON: PMV off at noc     RESPONSE:             BS:   Coarse             VITAL SIGNS: Sat 93-96%, HR 76-92, RR 20-23             EMOTIONAL NEEDS / CONCERNS:  None                RISK FOR SELF DECANNULATION:  Yes                        RISK DUE TO:  Confusion                        INTERVENTION/S IN PLACE IS/ARE: Mitts x2       NOTE / PLAN:     Pt remains on TM and tolerating well with no distress.  Continue current care plan.

## 2021-08-26 NOTE — PROGRESS NOTES
[facility]    Medicine Progress Note - Hospitalist Service       Date of Admission:  6/11/2021    Assessment & Plan          Summary:     Dayron Neri is a 70 year male with h/o HTN who presented to ED on 5/15/2021 with acute SAH after a fall with unresponsiveness.  He underwent emergent left external ventricular drain placement.  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  On 5/28/2021, patient had persistent cerebral vasospasm, CT head showed bilateral BAYLEE infarct and was treated with milrinone and verapamil.  On 6/1/2021, angiogram showed no evidence of vasospasm.  Extubated on 6/3/2021.  6/6/2021 he was reintubated due to hypoxia.  On 6/7/2021, patient underwent tracheostomy and PEG tube placement.  EVD removed 6/9/2021.   Patient was treated for possible pneumonia with Unasyn on 5/20/2021, It was switched to ceftriaxone the next day for sputum culture growing Klebsiella. On 5/23, sputum culture also grew Pseudomonas. Antibiotic was switched to Zosyn.  Due to increased WBCs in CSF, possibility of ventriculitis was raised. CSF culture was negative. Antibiotic was switched to cefepime and vancomycin on 5/28/2021 for 2 weeks.  Vanco was discontinued on 6/9/2021.  On 6/5/2021, sputum grew ESBL so cefepime was switched to meropenem. He was transferred to [facility] on 6/11/21.      Repeat sputum culture 6/19 showed MDR pseudomonas and he was started on tobramycin nebs from 6/24/2021 - 7/9/2021 .  7/8/2021 aspirated tube feeds. Spiked a fever that evening 102.8 and WBC went up to 36.9 the next morning so ID reconsulted and was started on ceftazadime-avibactam along with flagyl 7/9/2021 - 7/18/21. He has had repeated bouts of Klebsiella infection in sputum and ventilator associated pneumonia.  Additionally he has had a large amount of tracheal secretions, significant cognitive impairment, left-sided neglect and ongoing episodes of  agitation.  Patient's family has been monitoring the patient continuously during the daytime hours via video monitor and visit with him in person over the weekends.     8/26 :   Discussed with neuro - will try provigil instead of ritalin.  Respiratory status - Phase 2 Wean - FiO2 21%, 20 liters/minute. PMV during the day.    On Duo-neb, four times a day sodium chloride neb two times a day for  pulmonary hygiene.    Still having significant secretions, Frequent suctioning 4-6 x/shift. More often during the night. On scopolamine patch  Barriers to discharge : still needing high flow oxygen, frequent suctioning and needing mitt restraints      Assessment & Plan     Acute on chronic hypoxemic respiratory failure:    S/p treatment pseudomonas ESBL and klebsiella pneumonia.           - excessive thick white secretions is the main barrier to decannulation           - glycopyrolate was d/thang earlier this month per pulmonary medicine   - PVR during the day and patient is tolerating. Trach dome and cuff deflated at night. Weaning has been slow and the biggest barrier for decannulation is excessive secretions  - Continue to wean per RT and pulmonary medicine   - Continue  duonebs, acetylcysteine, and 3% saline nebs   - Continue Scopolamine (started 7/20/2021).  Pulmonology not recommending increase in Scopolamine patch as this may increase anticholinergic effect.       Ventilator associated pneumonia: Completed antibiotics on July 18.     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 sputum culture 6/19 showed MDR pseudomonas. completed Tobramycin nebs 6/24/2021 - 7/9/2021.   - Continue pulmonary toilet, scheduled duonebs.      Traumatic brain injury/intracranial hemorrage: 6/14/21 CT head done for fatigue and read as slight increase in caliber of lateral and third ventricle with possibility of developing communicating hydrocephalus. Discussed with Dr. Casillas  "(neurosurgeon at [facility]), who did not think there was much change. CT head repeated on 6/16/21, which did not show any change.  - Need follow up with Dr. Martinez with Neurosurgery in 3 months with repeat CTA of head and neck for post-op f/u. Next CTA of head and neck mid September      Dysphagia:   -Patient failed the blue dye test 7/20/21 with immediate aspiration, continue to use tube feeds.  Did pass the blue dye test a week later but still high risk for aspiration.    - Speech therapy following. See note from 8/17/21. Per speech therapist, despite several weeks of working with patient there has been \"no functional improvement in patient's ability to manage his oropharyngeal secretions.\" Speech discussed this with wife. Intensive dysphagia treatment has been discontinued and speech therapy is still continuing to follow   - Still having significant secretions, keep NPO with tube feeds due to concerns with aspiration.      Acute metabolic encephalopathy:  - Continue effexor, switch to provigil and see if that helps  - Patient on Effexor for agitation, increased on 8/18 to 50 mg three times daily      Hypercalcemia   - Calcium has been high normal since early July averaging 10.4-10.9. Ca+ 10.4 last check, slightly improved. Ionized calcium 1.4-1.45 on 8/11/21, Vitamin D level 30, PTH 26   - Nephrology following   - likely secondary to prolonged hospitalization/immobilization and CKD III based on 24 hour clearance estimating GFR 45  - Tube feeds were adjusted to 800 mg less of calcium per day    Chronic kidney disease (CKD) stage 3a  - creat stable to improved    Hypernatremia  - Na+ 150 on 8/16/21, Normal saline flushes via tube feed stopped. D5 infusion and lasix given last week. Na+ stable this week     Malnutrition:    - Level of malnutrition: Severe   - Based on: moderate/severe subcutaneous fat loss, moderate/severe muscle loss  - continuous tube feeding. Dietician following      Anemia: hemoglobin stable 10.8. " Continue to monitor.      Essential hypertension: BP controlled. On lisinopril     Severe debility, weakness, critical illness, deconditioning, Continue PT/OT     Insomnia: is on Ritalin which was decreased to once daily due to insomnia when taken later in day. Switching to Provigil in AM to see if that helps.  - Continue nighttime mirtazapine 7.5 mg scheduled.    Slow transit constipation  -Constipated last week.  This improved with milk of magnesia, bisacodyl suppository and 1 dose of MiraLAX.  Currently resolved.      Diet: Adult Formula Drip Feeding: Continuous [employer] Peptide 1.5; Gastrostomy; Goal Rate: 90 mL/hr; Medication - Feeding Tube Flush Frequency: At least 15-30 mL water before and after medication administration and with tube clogging   DVT Prophylaxis: Heparin SQ   Gutierrez Catheter: Not present   Central Lines: None   Code Status: Full Code         Disposition Plan   Expected discharge: to be determined. Barriers: bilateral hand mitts and frequent trach suctioning    ___________________________________________________________________    Interval History   Spoke with nurse - didn't sleep well.  Tired today. When hand mitts off patient is touching and picking at trach site. Tolerating being up in the chair during the day. .     Data reviewed today: I reviewed all medications, new labs and imaging results over the last 24 hours. I personally reviewed no images or EKG's today.    Physical Exam   Vital Signs: Temp: 98.7  F (37.1  C) Temp src: Axillary BP: 120/82 Pulse: 78   Resp: 20 SpO2: 96 % O2 Device: Trach dome Oxygen Delivery: 20 LPM  Weight: 157 lbs 12.8 oz  General Appearance:  Alert, in chair, gesturing with right hand, no apparent distress   Respiratory: coarse anterior breath sounds, no wheezing or crackles, tracheostomy patent with no visible secretions, PMV in place, surrounding skin around trach covered with gauze, no bleeding around trach site   Cardiovascular: S1,S2, rhythm rate regular,  negative murmur   GI: Bowel sounds positive x 4, non distended non tender, G/J tube appears patent with tube feeding infusing, surrounding skin dry/intact  Skin: pink dry and intact  Neurological: alert and oriented x 1, smiles, whispers/mouths words when speaking valve in,  partially understandable, moving bilaterally upper extremities, right greater than left with chronic left sided neglet, does follow a few simple commands but is somewhat inconsistent    Data   Recent Labs   Lab 08/24/21  0552 08/23/21  0653 08/20/21  0611   WBC  --  8.1  --    HGB  --  10.8*  --    MCV  --  99  --    PLT  --  377  --     145 144   POTASSIUM 4.6 4.5 4.6   CHLORIDE 103 105 104   CO2 28 28 28   BUN 44* 46* 39*   CR 0.88 1.00 0.88   ANIONGAP 11 12 12   RAGINI 10.4 10.9* 10.8*    123 120     Medications     dextrose       sodium chloride         acetylcysteine  2 mL Nebulization BID     famotidine  20 mg Oral or Feeding Tube BID     heparin ANTICOAGULANT  5,000 Units Subcutaneous Q8H     ipratropium - albuterol 0.5 mg/2.5 mg/3 mL  3 mL Nebulization BID     lisinopril  40 mg Oral or Feeding Tube Daily     menthol-zinc oxide   Topical TID     mirtazapine  7.5 mg Per Feeding Tube At Bedtime     [START ON 8/27/2021] modafinil  100 mg Oral Daily     scopolamine  1 patch Transdermal Q72H    And     scopolamine   Transdermal Q8H     sodium chloride  3 mL Nebulization BID     venlafaxine  50 mg Per Feeding Tube TID w/meals     Restraint:     Patient pulls his IV line and trach.      Within an hour after restraint an in person face to face assessment was completed at 8:00 AM, including an evaluation of the patient's immediate reaction to the intervention, behavioral assessment and review/assessment of history, drugs and medications, recent labs, etc., and behavioral condition.  The patient experienced no adverse physical outcome from seclusion/restraint initiation.  The intervention of restraint or seclusion needs to continue

## 2021-08-26 NOTE — PROGRESS NOTES
RENAL PROGRESS NOTE    CC:  Hypercalcemia     ASSESSMENT & PLAN:   70 year old male with past medical history of hypertension, admitted with subarachnoid hemorrhage after a fall on 5/15/2021 s/p emergent left external ventricular drain placement with posterior communicating artery aneurysm defied structured underwent craniotomy with clipping of aneurysm , complicated by persistent cerebral vasospasms and bilateral BAYLEE infarct.  Course also complicated by respiratory failure with intubation pneumonia was treated with cefepime and Vanco, transferred to LTAC on 6/11/2021.  Nephrology consulted for hypercalcemia on 8/15/2021     Hypercalcemia  Likely CKD III based on 24 hour cr clearance   Calcium has been high normal since early July 1 noted 7/5/2021 @ 10.6, ionized calcium 1.4  Was progressively worsening to 11s, with ionized calcium of 1.4-1.45  PTH 26 on 8/11/2021, vitamin D level 30 8/15/2021  Has been on Ritalin since mid July, calcium was already mildly elevated before that, no other supplements noted  Likely secondary to immobilization/ prolonged hospitalization, CKD  -PTH 26 (not appropriately suppressed) likely 2/2 so some underlying CKD   No history suggestive of any lymphoma or lymphadenopathy or any granulomatosis related disorders to suggest any additional etiology.  --Tube feeds were adjusted 8/15 to lower calcium content   -- Started on  ml every 2 hours enterally on 8/14/2021, then serum sodium 150 8/16/2021, stopped NS flushes in tube feeds 8/16/2021, started D5W at 100 ml/hr and dosed IV Lasix 20 mg. Sodium improved to normal and ca stalled in the 10's  --Stopped D5W on 8/17/21. Started free water flushes 200cc q4h.   --Started po Lasix 20 mg BID down feeding tube 8/17/21. Now with bump in cr 8/23/2021  --Was slightly volume behind. Stopped diuretics. Increased free water flushes to 300 ml q3h. Euvolemic now    --8/24/21 cr 0.88 and ca improved to 10.4, cont the same. Recheck BMP  8/27/21  --Discussed with Dr Pradhan      Creatinine based GFR is preserved   24 hour cr clearance estimates GFR at 45 indicating stage 3 CKD       Hypertension lisinopril 40 mg    euvolemic   Likely elevated insensible losses with vent and tachypnea  --Blood pressure stable      Anemia  Inflammatory  Mild management per hospitalist medicine     Acute on chronic hypoxemic respiratory failure  S/p trach  ESBL Pseudomonas and Klebsiella pneumonia  Had tobramycin nebs for MDR Pseudomonas  Pulmonary following  Currently off antibiotics     Malnutrition on tube feed, adjusted for lower calcium      SAH w fall sp craniotomy and hematoma evacuation , PCA aneurysm clip  cb BAYLEE stroke ad vasopasm  No on rehab     Insomnia/mood disorders  On Ritalin and mirtazapine  Management per primary medicine     Metabolic encephalopathy stable    SUBJECTIVE:  Condom cath occasionally falling off, patient is incontinent intermittently   I & O not fully accurate   Weights taken on bed scale   Currently working with OT and wife on ipad   Patient says he is doing well   Denies any increased difficulty with breathing    Discussed with RN and patient wife       OBJECTIVE:  Physical Exam   Temp: 98.7  F (37.1  C) Temp src: Axillary BP: 120/82 Pulse: 79   Resp: 20 SpO2: 99 % O2 Device: Trach dome Oxygen Delivery: 20 LPM  Vitals:    08/24/21 0538 08/25/21 0815 08/26/21 0558   Weight: 69.6 kg (153 lb 7 oz) 73.9 kg (163 lb) 71.6 kg (157 lb 12.8 oz)     Vital Signs with Ranges  Temp:  [98  F (36.7  C)-98.7  F (37.1  C)] 98.7  F (37.1  C)  Pulse:  [76-93] 79  Resp:  [20-24] 20  BP: (100-123)/(69-82) 120/82  FiO2 (%):  [21 %] 21 %  SpO2:  [92 %-99 %] 99 %  I/O last 3 completed shifts:  In: 4314 [NG/GT:4314]  Out: 2025 [Urine:2025]      Patient Vitals for the past 72 hrs:   Weight   08/26/21 0558 71.6 kg (157 lb 12.8 oz)   08/25/21 0815 73.9 kg (163 lb)   08/24/21 0538 69.6 kg (153 lb 7 oz)       Intake/Output Summary (Last 24 hours) at 8/16/2021  1118  Last data filed at 8/16/2021 1000  Gross per 24 hour   Intake 4214 ml   Output 1150 ml   Net 3064 ml       PHYSICAL EXAM:  General: Awake, NAD, in chair working with OT   HENT: Supple, Non-tender, no obvious JVD  Eyes: No scleral icterus  Cardiovascular: RRR, no rub, gallop, or murmur. No edema.  Respiratory: Non-labored, trach +  Gastrointestinal: Soft, non-tender, non-distended  Integumentary: Warm, dry, no rash  Neurologic: Grossly intact     LABORATORY STUDIES:     Recent Labs   Lab 08/23/21  0653   WBC 8.1   RBC 3.33*   HGB 10.8*   HCT 33.0*          Basic Metabolic Panel:  Recent Labs   Lab 08/24/21  0552 08/23/21  0653 08/20/21  0611    145 144   POTASSIUM 4.6 4.5 4.6   CHLORIDE 103 105 104   CO2 28 28 28   BUN 44* 46* 39*   CR 0.88 1.00 0.88    123 120   RAGINI 10.4 10.9* 10.8*       INRNo lab results found in last 7 days.     Recent Labs   Lab Test 08/23/21  0653 08/16/21  0649 05/16/21  0405 05/15/21  2125 05/15/21  1222   INR  --   --   --  1.20* 1.16*   WBC 8.1 6.7   < > 12.3* 16.5*   HGB 10.8* 9.6*   < > 11.8* 13.6    377   < > 239 257    < > = values in this interval not displayed.       Personally reviewed current labs    Elpidio Rowley PA-C  [facility]  [phone]

## 2021-08-26 NOTE — PROGRESS NOTES
WOC RN Progress Note    Today's Visit:   Patient has circumferential hyperplasia, extending out 0.2cm 8-4 o'clock and up to 0.5cm 4-8 o'clock  Will continue to assess weekly.  Glenda Bo, SAUDN, RN, PHN, HNB-BC, CWOCN

## 2021-08-26 NOTE — PLAN OF CARE
Problem: Device-Related Complication Risk (Artificial Airway)  Goal: Optimal Device Function  Outcome: Improving  Intervention: Optimize Device Care and Function  Recent Flowsheet Documentation  Taken 8/25/2021 1927 by Timoteo Tran RT  Airway Safety Measures:    all equipment/monitors on and audible    manual resuscitator at bedside    suction at bedside    suction equipment    oxygen flowmeter     Problem: Skin and Tissue Injury (Artificial Airway)  Goal: Absence of Device-Related Skin or Tissue Injury  Outcome: Improving     Problem: Communication Impairment (Artificial Airway)  Goal: Effective Communication  Outcome: Improving  RT PROGRESS NOTE     DATA:     CURRENT SETTINGS:             TRACH TYPE / SIZE: #6 Bivona (Cuff down, placed 07/28)             MODE:  TM             FIO2:   21%, 20 L     ACTION:             THERAPIES: Duo-neb/Mucomyst/Saline/MetaNeb BID             SUCTION:  Yes                          FREQUENCY: 4x                         AMOUNT:   Moderate x3 to Large x1                        CONSISTENCY: Thick                        COLOR:   Pale yellow             SPONTANEOUS COUGH EFFORT/STRENGTH OF EFFORT (not elicited by suctioning): Strong productive cough.                              WEANING PHASE:   2                        WEAN MODE:  TM with PMV                        WEAN TIME:  14 hours and 5 minutes                        END WEAN REASON: PMV off at noc     RESPONSE:             BS:   Coarse             VITAL SIGNS: Sat 93-96%, HR 76-92, RR 20-23             EMOTIONAL NEEDS / CONCERNS:  None                RISK FOR SELF DECANNULATION:  Yes                        RISK DUE TO:  Confusion                        INTERVENTION/S IN PLACE IS/ARE: Mitts x2       NOTE / PLAN:     Pt remains on TM and tolerating well with no distress.  Continue current care plan.

## 2021-08-26 NOTE — PLAN OF CARE
Pt is alert, denied pain. Up to the chair and tolerated well. Suctioned for thick white secretion. Pt has a strong congested cough. 300 ml H2O flush every 3 hours. Smear of BM this shift. Turn and repositioned for comfort. Pt now resting.  Problem: Adult Inpatient Plan of Care  Goal: Plan of Care Review  Outcome: No Change  Flowsheets (Taken 8/26/2021 1447)  Plan of Care Reviewed With:   patient   spouse   caregiver     Problem: Adult Inpatient Plan of Care  Goal: Absence of Hospital-Acquired Illness or Injury  Intervention: Identify and Manage Fall Risk  Recent Flowsheet Documentation  Taken 8/26/2021 0830 by Erika Mccloud RN  Safety Promotion/Fall Prevention:   bed alarm on   fall prevention program maintained   room door open   room near nurse's station   room organization consistent   patient and family education   clutter free environment maintained  Intervention: Prevent Skin Injury  Recent Flowsheet Documentation  Taken 8/26/2021 0830 by Erika Mccloud RN  Body Position: supine  Intervention: Prevent Infection  Recent Flowsheet Documentation  Taken 8/26/2021 0830 by Erika Mccloud RN  Infection Prevention:   hand hygiene promoted   personal protective equipment utilized   equipment surfaces disinfected     Problem: Device-Related Complication Risk (Mechanical Ventilation, Invasive)  Goal: Optimal Device Function  Intervention: Optimize Device Care and Function  Recent Flowsheet Documentation  Taken 8/26/2021 0830 by Erika Mccloud RN  Airway Safety Measures:   all equipment/monitors on and audible   manual resuscitator/mask/valve in room   mask at bedside   manual resuscitator at bedside   suction at bedside   suction equipment   oxygen flowmeter     Problem: Ventilator-Induced Lung Injury (Mechanical Ventilation, Invasive)  Goal: Absence of Ventilator-Induced Lung Injury  Intervention: Prevent Ventilator-Associated Pneumonia  Recent Flowsheet Documentation  Taken 8/26/2021 0830 by Erika Mccloud  RN  Head of Bed (HOB) Positioning: HOB at 30 degrees     Problem: Device-Related Complication Risk (Artificial Airway)  Goal: Optimal Device Function  Intervention: Optimize Device Care and Function  Recent Flowsheet Documentation  Taken 8/26/2021 0830 by Erika Mccloud RN  Airway Safety Measures:   all equipment/monitors on and audible   manual resuscitator/mask/valve in room   mask at bedside   manual resuscitator at bedside   suction at bedside   suction equipment   oxygen flowmeter

## 2021-08-26 NOTE — PLAN OF CARE
Problem: Adult Inpatient Plan of Care  Goal: Plan of Care Review  Outcome: No Change       Pt wakeful in bed for the first few hrs of the night shift watching television per his request. Writer offered to turn the tv off, but pt declined stating to keep it on. He fell asleep @ ~ 0200 & slept well t/o the remainder of the night. UO= 1425 ml yellow urine from condom catheter. Received 300 ml water flushes Q 3 hrs. Pt only oriented to self. Was reoriented to current whereabouts & name of hospital. Has been on trach dome 20L, 32%, LS are coarse/diminished. Denies having any pain. Wears bilateral hand mitts to ensure lines/tubes are protected.

## 2021-08-26 NOTE — PROGRESS NOTES
Pulmonary Medicine Follow up Note - Ventilator Rounds:    Clinical status discussed today with RN and respiratory therapist.    Chief complaint: Ongoing respiratory failure  Significant change in clinical status during past 24 hours? No      FiO2 (%): 20 %  Resp: 20    Tracheal secretions: Moderate secretions requiring suctioning 3*.     Current phase of ventilator weaning pathway:  Phase 2 with PMV days as tolerated, PMV off with cuff inflated at night.    Ventilator weaning results from yesterday: TM 14 hours and 5 minutes, PMV all day  PMV off for night, cuff up.     Current Facility-Administered Medications   Medication     acetaminophen (TYLENOL) solution 650 mg    Or     acetaminophen (TYLENOL) tablet 650 mg     acetylcysteine (MUCOMYST) 20 % nebulizer solution 2 mL     alteplase (CATHFLO ACTIVASE) injection 2 mg     bisacodyl (DULCOLAX) Suppository 10 mg     dextrose 10% infusion     dextrose 50 % injection 25-50 mL    Or     glucagon injection 1 mg     docusate (COLACE) 50 MG/5ML liquid 100 mg    Or     docusate sodium (COLACE) capsule 100 mg     famotidine (PEPCID) tablet 20 mg     heparin ANTICOAGULANT injection 5,000 Units     hydrALAZINE (APRESOLINE) injection 10 mg     ipratropium - albuterol 0.5 mg/2.5 mg/3 mL (DUONEB) neb solution 3 mL     lisinopril (ZESTRIL) tablet 40 mg     loperamide (IMODIUM) liquid 1 mg     magnesium hydroxide (MILK OF MAGNESIA) suspension 30 mL     menthol-zinc oxide (CALMOSEPTINE) 0.44-20.6 % ointment OINT     mirtazapine (REMERON) tablet TABS 7.5 mg     [START ON 8/27/2021] modafinil (PROVIGIL) tablet 100 mg     naloxone (NARCAN) injection 0.2 mg    Or     naloxone (NARCAN) injection 0.4 mg    Or     naloxone (NARCAN) injection 0.2 mg    Or     naloxone (NARCAN) injection 0.4 mg     ondansetron (ZOFRAN) solution 4 mg     polyethylene glycol (MIRALAX) Packet 17 g     scopolamine (TRANSDERM) 72 hr patch 1 patch    And     scopolamine (TRANSDERM-SCOP) Patch in Place     silver  "nitrate (ARZOL) Misc     sodium chloride (NEBUSAL) 3 % neb solution 3 mL     sodium chloride 0.9% infusion     venlafaxine (EFFEXOR) tablet 50 mg     Physical exam     /82 (BP Location: Left arm)   Pulse 78   Temp 98.7  F (37.1  C) (Axillary)   Resp 20   Ht 1.803 m (5' 10.98\")   Wt 71.6 kg (157 lb 12.8 oz)   SpO2 96%   BMI 22.02 kg/m      Gen: tracks intermittently  HEENT: AT/NC. Trach in place.   Lungs: diminished ant otherwise clear  CV: regular, no murmurs or gallops appreciated  Abdomen: soft, NT, BS wnl  Ext: no edema  Neuro: awake, tracking intermittently, smiles. Leans to his Right.     Sodium   Date Value Ref Range Status   08/24/2021 142 136 - 145 mmol/L Final   06/11/2021 143 133 - 144 mmol/L Final     Potassium   Date Value Ref Range Status   08/24/2021 4.6 3.5 - 5.0 mmol/L Final   06/11/2021 3.8 3.4 - 5.3 mmol/L Final     Chloride   Date Value Ref Range Status   08/24/2021 103 98 - 107 mmol/L Final   06/11/2021 106 94 - 109 mmol/L Final     Carbon Dioxide   Date Value Ref Range Status   06/11/2021 36 (H) 20 - 32 mmol/L Final     Carbon Dioxide (CO2)   Date Value Ref Range Status   08/24/2021 28 22 - 31 mmol/L Final     Anion Gap   Date Value Ref Range Status   08/24/2021 11 5 - 18 mmol/L Final   06/11/2021 2 (L) 3 - 14 mmol/L Final     Glucose   Date Value Ref Range Status   08/24/2021 105 70 - 125 mg/dL Final   06/11/2021 142 (H) 70 - 99 mg/dL Final     Urea Nitrogen   Date Value Ref Range Status   08/24/2021 44 (H) 8 - 22 mg/dL Final   06/11/2021 25 7 - 30 mg/dL Final     Creatinine   Date Value Ref Range Status   08/24/2021 0.88 0.70 - 1.30 mg/dL Final   06/11/2021 0.57 (L) 0.66 - 1.25 mg/dL Final     GFR Estimate   Date Value Ref Range Status   08/24/2021 88 >60 mL/min/1.73m2 Final     Comment:     As of July 11, 2021, eGFR is calculated by the CKD-EPI creatinine equation, without race adjustment. eGFR can be influenced by muscle mass, exercise, and diet. The reported eGFR is an " estimation only and is only applicable if the renal function is stable.   07/09/2021 >60 >60 mL/min/1.73m2 Final   06/11/2021 >90 >60 mL/min/[1.73_m2] Final     Comment:     Non  GFR Calc  Starting 12/18/2018, serum creatinine based estimated GFR (eGFR) will be   calculated using the Chronic Kidney Disease Epidemiology Collaboration   (CKD-EPI) equation.       Calcium   Date Value Ref Range Status   08/24/2021 10.4 8.5 - 10.5 mg/dL Final   06/11/2021 9.6 8.5 - 10.1 mg/dL Final        Diagnosis:     Patient is a 71 yo man with history of hypertension. Patient initially presented on 15-May-2021 to [facility] after being found unresponsive by his wife. Patient was then found to have a subarachnoid hemorrhage secondary to a ruptured aneurysm. Patient was intubated and was started on mannitol, IV antihypertensives and hyperventilation and patient was then transferred to [facility]. Patient underwent aneurysm clipping as well as placement of extra-ventricular drain from hydrocephalus on 15-May-2021. Extra-ventricular drain would malfunction and would require replacement on 04-Jun-2021 and 06-Jun-2021. Extra-ventricular drain reportedly was removed on 09-Jun-2021. Patient had intra-arterial vasospasm treatment with verapamil on 28-May-2021.  Patient was extubated on 16-May-2021 but had to be reintubated on 19-May-2021. Patient would be extubated on 03-Jun-2021 but would be reintubated on 06-Jun-2021. Patient had tracheostomy and PEG-tube placed on 07-Jun-2021. Patient required treatment for hospital-acquired pneumonia. Patient was transferred to LTAC on 11-Jun-2021.    1. Acute respiratory failure s/p trach 6/7/2021  2. S/p treatment pseudomonas ESBL and klebsiella pneumonia   3. Encephalopathy   4. Subarachnoid bleed s/p craniotomy and clipping P-comm rupture aneurysm.   5. HTN  6. Malnourishment   7. Dysphagia s/p G tube    Recommendations/Plans (MDM):  1. Weaning  orders: continue phase 2 with PMV days as tolerated, cuff down at night.    2. Trach change? Routine #6 bivona, next changed due 8/28  3. Diagnostic testing? no  4. Continue pulmonary toiletting, scheduled ipratropium-albuterol, 3% saline, acetylcysteine, metaneb.     Jaylyn Cartagena MD  Pulmonary and Critical Care  (P) [phone]

## 2021-08-27 ENCOUNTER — APPOINTMENT (OUTPATIENT)
Dept: OCCUPATIONAL THERAPY | Facility: CLINIC | Age: 71
DRG: 207 | End: 2021-08-27
Attending: HOSPITALIST
Payer: COMMERCIAL

## 2021-08-27 ENCOUNTER — APPOINTMENT (OUTPATIENT)
Dept: PHYSICAL THERAPY | Facility: CLINIC | Age: 71
DRG: 207 | End: 2021-08-27
Attending: HOSPITALIST
Payer: COMMERCIAL

## 2021-08-27 LAB
ANION GAP SERPL CALCULATED.3IONS-SCNC: 11 MMOL/L (ref 5–18)
BUN SERPL-MCNC: 31 MG/DL (ref 8–22)
CALCIUM SERPL-MCNC: 10.6 MG/DL (ref 8.5–10.5)
CHLORIDE BLD-SCNC: 101 MMOL/L (ref 98–107)
CO2 SERPL-SCNC: 27 MMOL/L (ref 22–31)
CREAT SERPL-MCNC: 0.84 MG/DL (ref 0.7–1.3)
GFR SERPL CREATININE-BSD FRML MDRD: 90 ML/MIN/1.73M2
GLUCOSE BLD-MCNC: 123 MG/DL (ref 70–125)
POTASSIUM BLD-SCNC: 4.6 MMOL/L (ref 3.5–5)
SODIUM SERPL-SCNC: 139 MMOL/L (ref 136–145)

## 2021-08-27 PROCEDURE — 999N000009 HC STATISTIC AIRWAY CARE

## 2021-08-27 PROCEDURE — 250N000013 HC RX MED GY IP 250 OP 250 PS 637: Performed by: NURSE PRACTITIONER

## 2021-08-27 PROCEDURE — 94640 AIRWAY INHALATION TREATMENT: CPT

## 2021-08-27 PROCEDURE — 97530 THERAPEUTIC ACTIVITIES: CPT | Mod: GP | Performed by: PHYSICAL THERAPIST

## 2021-08-27 PROCEDURE — 97129 THER IVNTJ 1ST 15 MIN: CPT | Mod: GO | Performed by: OCCUPATIONAL THERAPIST

## 2021-08-27 PROCEDURE — 250N000009 HC RX 250: Performed by: INTERNAL MEDICINE

## 2021-08-27 PROCEDURE — 94660 CPAP INITIATION&MGMT: CPT

## 2021-08-27 PROCEDURE — 120N000017 HC R&B RESPIRATORY CARE

## 2021-08-27 PROCEDURE — 94640 AIRWAY INHALATION TREATMENT: CPT | Mod: 76

## 2021-08-27 PROCEDURE — 94668 MNPJ CHEST WALL SBSQ: CPT

## 2021-08-27 PROCEDURE — 999N000123 HC STATISTIC OXYGEN O2DAILY TECH TIME

## 2021-08-27 PROCEDURE — 999N000157 HC STATISTIC RCP TIME EA 10 MIN

## 2021-08-27 PROCEDURE — 250N000013 HC RX MED GY IP 250 OP 250 PS 637: Performed by: INTERNAL MEDICINE

## 2021-08-27 PROCEDURE — 80048 BASIC METABOLIC PNL TOTAL CA: CPT | Performed by: PHYSICIAN ASSISTANT

## 2021-08-27 PROCEDURE — 99233 SBSQ HOSP IP/OBS HIGH 50: CPT | Performed by: INTERNAL MEDICINE

## 2021-08-27 PROCEDURE — 250N000013 HC RX MED GY IP 250 OP 250 PS 637

## 2021-08-27 PROCEDURE — 97110 THERAPEUTIC EXERCISES: CPT | Mod: GP | Performed by: PHYSICAL THERAPIST

## 2021-08-27 PROCEDURE — 97530 THERAPEUTIC ACTIVITIES: CPT | Mod: GO | Performed by: OCCUPATIONAL THERAPIST

## 2021-08-27 PROCEDURE — 99232 SBSQ HOSP IP/OBS MODERATE 35: CPT | Performed by: PHYSICIAN ASSISTANT

## 2021-08-27 PROCEDURE — 250N000013 HC RX MED GY IP 250 OP 250 PS 637: Performed by: HOSPITALIST

## 2021-08-27 PROCEDURE — 250N000011 HC RX IP 250 OP 636

## 2021-08-27 RX ORDER — ARMODAFINIL 150 MG/1
150 TABLET ORAL EVERY MORNING
Status: DISCONTINUED | OUTPATIENT
Start: 2021-08-28 | End: 2021-09-10

## 2021-08-27 RX ADMIN — VENLAFAXINE 50 MG: 25 TABLET ORAL at 16:38

## 2021-08-27 RX ADMIN — SODIUM CHLORIDE 30 MG/ML INHALATION SOLUTION 3 ML: 30 SOLUTION INHALANT at 07:01

## 2021-08-27 RX ADMIN — FAMOTIDINE 20 MG: 20 TABLET, FILM COATED ORAL at 21:16

## 2021-08-27 RX ADMIN — HEPARIN SODIUM 5000 UNITS: 5000 INJECTION, SOLUTION INTRAVENOUS; SUBCUTANEOUS at 06:08

## 2021-08-27 RX ADMIN — ANORECTAL OINTMENT: 15.7; .44; 24; 20.6 OINTMENT TOPICAL at 21:17

## 2021-08-27 RX ADMIN — LISINOPRIL 40 MG: 20 TABLET ORAL at 08:33

## 2021-08-27 RX ADMIN — ACETYLCYSTEINE 2 ML: 200 SOLUTION ORAL; RESPIRATORY (INHALATION) at 07:01

## 2021-08-27 RX ADMIN — HEPARIN SODIUM 5000 UNITS: 5000 INJECTION, SOLUTION INTRAVENOUS; SUBCUTANEOUS at 13:26

## 2021-08-27 RX ADMIN — VENLAFAXINE 50 MG: 25 TABLET ORAL at 08:34

## 2021-08-27 RX ADMIN — ANORECTAL OINTMENT: 15.7; .44; 24; 20.6 OINTMENT TOPICAL at 13:26

## 2021-08-27 RX ADMIN — MIRTAZAPINE 7.5 MG: 7.5 TABLET ORAL at 21:16

## 2021-08-27 RX ADMIN — IPRATROPIUM BROMIDE AND ALBUTEROL SULFATE 3 ML: 2.5; .5 SOLUTION RESPIRATORY (INHALATION) at 20:06

## 2021-08-27 RX ADMIN — FAMOTIDINE 20 MG: 20 TABLET, FILM COATED ORAL at 08:34

## 2021-08-27 RX ADMIN — HEPARIN SODIUM 5000 UNITS: 5000 INJECTION, SOLUTION INTRAVENOUS; SUBCUTANEOUS at 21:16

## 2021-08-27 RX ADMIN — IPRATROPIUM BROMIDE AND ALBUTEROL SULFATE 3 ML: 2.5; .5 SOLUTION RESPIRATORY (INHALATION) at 07:01

## 2021-08-27 RX ADMIN — ANORECTAL OINTMENT: 15.7; .44; 24; 20.6 OINTMENT TOPICAL at 08:34

## 2021-08-27 RX ADMIN — VENLAFAXINE 50 MG: 25 TABLET ORAL at 13:24

## 2021-08-27 RX ADMIN — ACETYLCYSTEINE 2 ML: 200 SOLUTION ORAL; RESPIRATORY (INHALATION) at 20:06

## 2021-08-27 ASSESSMENT — MIFFLIN-ST. JEOR: SCORE: 1515.81

## 2021-08-27 NOTE — PLAN OF CARE
Problem: Adult Inpatient Plan of Care  Goal: Plan of Care Review  Outcome: No Change       Pt had a good night sleeping 90% of the night. Continues to wear bilateral hand mitts to remind pt not to pull/dislodge any lines/tubes. Ran a low grade temp of 99.5, other VSS. Uses a condom catheter to contain urine. On trach dome 20L, 32%. LS are clear/diminished, O2 sats stable in mid 90's. Pt is only oriented to self. Attempts to talk to answer staff's questions. Voice is very soft, whispers, speech somewhat garbled. Offers no c/o pain.

## 2021-08-27 NOTE — PLAN OF CARE
Problem: Communication Impairment (Artificial Airway)  Goal: Effective Communication  Outcome: Improving     Problem: Device-Related Complication Risk (Artificial Airway)  Goal: Optimal Device Function  Outcome: Improving     Problem: Skin and Tissue Injury (Artificial Airway)  Goal: Absence of Device-Related Skin or Tissue Injury  Outcome: Improving   RT PROGRESS NOTE     DATA:     CURRENT SETTINGS:               TRACH TYPE / SIZE: #6 BIVONA TTS Changed on 7/28/21             MODE: TM/PMV             FIO2:  25%@20 L/MIN     ACTION:             THERAPIES: DuoNeb/ Mucomyst/ MetaNeb BID                SUCTION:                           FREQUENCY: X 3                        AMOUNT: large to copious                         CONSISTENCY: thick                        COLOR:    White /tan             SPONTANEOUS COUGH EFFORT/STRENGTH OF EFFORT (not elicited by suctioning): Fair                               WEANING PHASE: 2                        WEAN MODE: PMV days as reji                          WEAN TIME:  Since 07:22  -patient to wear PMV thru the night as reji-starting tonight                         END WEAN REASON: ongoing      RESPONSE:             BS:  Clear Decreased              VITAL SIGNS: Sat 93-95%, HR  88-94, RR 20-22             EMOTIONAL NEEDS / CONCERNS:confused               RISK FOR SELF DECANNULATION: yes                        RISK DUE TO:  Confused                         INTERVENTION/S IN PLACE IS/ARE: Bilateral  hand  Mittens      NOTE / PLAN:  Cont. Current plan of care

## 2021-08-27 NOTE — PROGRESS NOTES
RENAL PROGRESS NOTE    CC:  Hypercalcemia     ASSESSMENT & PLAN:   70 year old male with past medical history of hypertension, admitted with subarachnoid hemorrhage after a fall on 5/15/2021 s/p emergent left external ventricular drain placement with posterior communicating artery aneurysm defied structured underwent craniotomy with clipping of aneurysm , complicated by persistent cerebral vasospasms and bilateral BAYLEE infarct.  Course also complicated by respiratory failure with intubation pneumonia was treated with cefepime and Vanco, transferred to LTAC on 6/11/2021.  Nephrology consulted for hypercalcemia on 8/15/2021     Hypercalcemia  Likely CKD III based on 24 hour cr clearance   Calcium has been high normal since early July 1 noted 7/5/2021 @ 10.6, ionized calcium 1.4  Was progressively worsening to 11s, with ionized calcium of 1.4-1.45  PTH 26 on 8/11/2021, vitamin D level 30 8/15/2021  Has been on Ritalin since mid July, calcium was already mildly elevated before that, no other supplements noted  Likely secondary to immobilization/ prolonged hospitalization, CKD  -PTH 26 (not appropriately suppressed) likely 2/2 so some underlying CKD   No history suggestive of any lymphoma or lymphadenopathy or any granulomatosis related disorders to suggest any additional etiology.  --Tube feeds were adjusted 8/15 to lower calcium content   -- Started on  ml every 2 hours enterally on 8/14/2021, then serum sodium 150 8/16/2021, stopped NS flushes in tube feeds 8/16/2021, started D5W at 100 ml/hr and dosed IV Lasix 20 mg. Sodium improved to normal and ca stalled in the 10's  --Stopped D5W on 8/17/21. Started free water flushes 200cc q4h.   --Started po Lasix 20 mg BID down feeding tube 8/17/21. Then with bump in cr 8/23/2021  --Was slightly volume behind. Stopped diuretics. Increased free water flushes to 300 ml q3h. Euvolemic now    --8/24/21 cr 0.88 and ca improved to 10.4, cont the same. Recheck BMP  8/30/21  --Discussed with Dr Pradhan      Creatinine based GFR is preserved   24 hour cr clearance estimates GFR at 45 indicating stage 3 CKD       Hypertension lisinopril 40 mg    euvolemic   Likely elevated insensible losses with vent and tachypnea  --Blood pressure stable      Anemia  Inflammatory  Mild management per hospitalist medicine     Acute on chronic hypoxemic respiratory failure  S/p trach  ESBL Pseudomonas and Klebsiella pneumonia  Had tobramycin nebs for MDR Pseudomonas  Pulmonary following  Currently off antibiotics     Malnutrition on tube feed, adjusted for lower calcium      SAH w fall sp craniotomy and hematoma evacuation , PCA aneurysm clip  cb BAYLEE stroke ad vasopasm  Now on rehab     Insomnia/mood disorders  On Ritalin and mirtazapine  Management per primary medicine     Metabolic encephalopathy stable    SUBJECTIVE:  Condom cath occasionally falling off, patient is incontinent intermittently   I & O not fully accurate   Weights taken on bed scale   Discussed with patient's daughter and patient's wife over ipad  Patient says he is doing well   Denies any increased difficulty with breathing    Will follow peripherally over the weekend, plan to see patient on 8/30/2021    OBJECTIVE:  Physical Exam   Temp: 99.3  F (37.4  C) Temp src: Axillary BP: 110/84 Pulse: 93   Resp: 24 SpO2: 92 % O2 Device: Trach dome Oxygen Delivery: 20 LPM  Vitals:    08/25/21 0815 08/26/21 0558 08/27/21 0645   Weight: 73.9 kg (163 lb) 71.6 kg (157 lb 12.8 oz) 72.4 kg (159 lb 9.6 oz)     Vital Signs with Ranges  Temp:  [98.1  F (36.7  C)-99.5  F (37.5  C)] 99.3  F (37.4  C)  Pulse:  [78-95] 93  Resp:  [20-24] 24  BP: (110-142)/(70-84) 110/84  FiO2 (%):  [21 %-32 %] 30 %  SpO2:  [92 %-96 %] 92 %  I/O last 3 completed shifts:  In: 4295 [NG/GT:4295]  Out: 1750 [Urine:1750]      Patient Vitals for the past 72 hrs:   Weight   08/27/21 0645 72.4 kg (159 lb 9.6 oz)   08/26/21 0558 71.6 kg (157 lb 12.8 oz)   08/25/21 0815 73.9 kg (163  lb)       Intake/Output Summary (Last 24 hours) at 8/16/2021 1118  Last data filed at 8/16/2021 1000  Gross per 24 hour   Intake 4214 ml   Output 1150 ml   Net 3064 ml       PHYSICAL EXAM:  General: Awake, NAD, In chair.   HENT: Supple, Non-tender, no obvious JVD  Eyes: No scleral icterus  Cardiovascular: RRR, no rub, gallop, or murmur. No edema.  Respiratory: Non-labored, trach +  Gastrointestinal: Soft, non-tender, non-distended  Integumentary: Warm, dry, no rash  Neurologic: Grossly intact     LABORATORY STUDIES:     Recent Labs   Lab 08/23/21  0653   WBC 8.1   RBC 3.33*   HGB 10.8*   HCT 33.0*          Basic Metabolic Panel:  Recent Labs   Lab 08/27/21  0808 08/24/21  0552 08/23/21  0653    142 145   POTASSIUM 4.6 4.6 4.5   CHLORIDE 101 103 105   CO2 27 28 28   BUN 31* 44* 46*   CR 0.84 0.88 1.00    105 123   RAGINI 10.6* 10.4 10.9*       INRNo lab results found in last 7 days.     Recent Labs   Lab Test 08/23/21  0653 08/16/21  0649 05/16/21  0405 05/15/21  2125 05/15/21  1222   INR  --   --   --  1.20* 1.16*   WBC 8.1 6.7   < > 12.3* 16.5*   HGB 10.8* 9.6*   < > 11.8* 13.6    377   < > 239 257    < > = values in this interval not displayed.       Personally reviewed current labs    Elpidio Rowley PA-C  [facility]  [phone]

## 2021-08-27 NOTE — PLAN OF CARE
Problem: Adult Inpatient Plan of Care  Goal: Patient-Specific Goal (Individualized)  Outcome: Improving   Patient continues to improve. Patient was more alert and able to respond and follow directions. Tube feed plus water flushes tolerated well. Patient denied pain.

## 2021-08-27 NOTE — PLAN OF CARE
Problem: Device-Related Complication Risk (Artificial Airway)  Goal: Optimal Device Function  Outcome: Improving     Problem: Skin and Tissue Injury (Artificial Airway)  Goal: Absence of Device-Related Skin or Tissue Injury  Outcome: Improving     Problem: Communication Impairment (Artificial Airway)  Goal: Effective Communication  Outcome: Improving       RT PROGRESS NOTE     DATA:     CURRENT SETTINGS:             TRACH TYPE / SIZE:  #6 Bivona placed on 7/28/21             MODE:   HFTM (cuff down)             FIO2:   32% and 20 lpm     ACTION:             THERAPIES:   MUCOMYST BID/DUO NEB BID/NaCl/META NEB BID             SUCTION:                           FREQUENCY:  X4                        AMOUNT:   Large to copious                        CONSISTENCY:   Thick                        COLOR:   White/yellow             SPONTANEOUS COUGH EFFORT/STRENGTH OF EFFORT (not elicited by suctioning): Yes:Strong                              WEANING PHASE:  #2                        WEAN MODE:   hftm/pmv 21% and 20 lpm as reji                        WEAN TIME:   11 hrs and 39 min                        END WEAN REASON:  Per order      RESPONSE:             BS:  Coarse             VITAL SIGNS:   SAT 93-95%, HR 90-95, RR 20-24             EMOTIONAL NEEDS / CONCERNS:  N/A                RISK FOR SELF DECANNULATION:  Yes                        RISK DUE TO:  Restless                        INTERVENTION/S IN PLACE IS/ARE:  Bilateral mitts     NOTE / PLAN:   Pt is on 21% and 20 lpm hftm with cuff down, reji well. Cont weaning on pmv days and cuff down at night.

## 2021-08-27 NOTE — PROGRESS NOTES
[facility]    Medicine Progress Note - Hospitalist Service       Date of Admission:  6/11/2021    Assessment & Plan          Summary:     Dayron Neri is a 70 year male with h/o HTN who presented to ED on 5/15/2021 with acute SAH after a fall with unresponsiveness.  He underwent emergent left external ventricular drain placement.  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  On 5/28/2021, patient had persistent cerebral vasospasm, CT head showed bilateral BAYLEE infarct and was treated with milrinone and verapamil.  On 6/1/2021, angiogram showed no evidence of vasospasm.  Extubated on 6/3/2021.  6/6/2021 he was reintubated due to hypoxia.  On 6/7/2021, patient underwent tracheostomy and PEG tube placement.  EVD removed 6/9/2021.   Patient was treated for possible pneumonia with Unasyn on 5/20/2021, It was switched to ceftriaxone the next day for sputum culture growing Klebsiella. On 5/23, sputum culture also grew Pseudomonas. Antibiotic was switched to Zosyn.  Due to increased WBCs in CSF, possibility of ventriculitis was raised. CSF culture was negative. Antibiotic was switched to cefepime and vancomycin on 5/28/2021 for 2 weeks.  Vanco was discontinued on 6/9/2021.  On 6/5/2021, sputum grew ESBL so cefepime was switched to meropenem. He was transferred to [facility] on 6/11/21.      Repeat sputum culture 6/19 showed MDR pseudomonas and he was started on tobramycin nebs from 6/24/2021 - 7/9/2021 .  7/8/2021 aspirated tube feeds. Spiked a fever that evening 102.8 and WBC went up to 36.9 the next morning so ID reconsulted and was started on ceftazadime-avibactam along with flagyl 7/9/2021 - 7/18/21. He has had repeated bouts of Klebsiella infection in sputum and ventilator associated pneumonia.  Additionally he has had a large amount of tracheal secretions, significant cognitive impairment, left-sided neglect and ongoing episodes of  agitation.  Patient's family has been monitoring the patient continuously during the daytime hours via video monitor and visit with him in person over the weekends.     8/27:   Discussed with neuro - will try Nuvigil instead of ritalin.  Respiratory status - Phase 2 Wean - FiO2 21-30%, 20 liters/minute. PMV during the day.    On Duo-neb four times a day for pulmonary hygiene. Stopping 3% saline nebs today per pulmonary   Still having significant secretions, Frequent suctioning 4-6 x/shift. More often during the night. On scopolamine patch  Barriers to discharge: still needing high flow oxygen, frequent suctioning and needing mitt restraints      Assessment & Plan     Acute on chronic hypoxemic respiratory failure:    S/p treatment pseudomonas ESBL and klebsiella pneumonia.           - excessive thick white secretions is the main barrier to decannulation           - glycopyrolate was d/thang earlier this month per pulmonary medicine   - PVR during the day and patient is tolerating. Trach dome and cuff deflated at night. Weaning has been slow and the biggest barrier for decannulation is excessive secretions  - Continue to wean per RT and pulmonary medicine   - Continue  duonebs, acetylcysteine, stopping saline nebs  - Continue Scopolamine (started 7/20/2021).  Pulmonology not recommending increase in Scopolamine patch as this may increase anticholinergic effect.       Ventilator associated pneumonia: Completed antibiotics on July 18.     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 sputum culture 6/19 showed MDR pseudomonas. completed Tobramycin nebs 6/24/2021 - 7/9/2021.   - Continue pulmonary toilet, scheduled duonebs.      Traumatic brain injury/intracranial hemorrage: 6/14/21 CT head done for fatigue and read as slight increase in caliber of lateral and third ventricle with possibility of developing communicating hydrocephalus. Discussed with Dr. Casillas  "(neurosurgeon at [facility]), who did not think there was much change. CT head repeated on 6/16/21, which did not show any change.  - Need follow up with Dr. Martinez with Neurosurgery in 3 months with repeat CTA of head and neck for post-op f/u. Next CTA of head and neck mid September      Dysphagia:   -Patient failed the blue dye test 7/20/21 with immediate aspiration, continue to use tube feeds.  Did pass the blue dye test a week later but still high risk for aspiration.    - Speech therapy following. See note from 8/17/21. Per speech therapist, despite several weeks of working with patient there has been \"no functional improvement in patient's ability to manage his oropharyngeal secretions.\" Speech discussed this with wife. Intensive dysphagia treatment has been discontinued and speech therapy is still continuing to follow   - Still having significant secretions, keep NPO with tube feeds due to concerns with aspiration.      Acute metabolic encephalopathy:  - Continue effexor, switch to provigil and see if that helps  - Patient on Effexor for agitation, increased on 8/18 to 50 mg three times daily      Hypercalcemia   - Calcium has been high normal since early July averaging 10.4-10.9. Ca+ 10.6 last check today,  Ionized calcium 1.4-1.45 on 8/11/21, Vitamin D level 30, PTH 26   - Nephrology following   - likely secondary to prolonged hospitalization/immobilization and CKD III based on 24 hour clearance estimating GFR 45  - Tube feeds were adjusted to 800 mg less of calcium per day    Chronic kidney disease (CKD) stage 3a  - creat stable to improved    Hypernatremia  - Na+ 150 on 8/16/21, Normal saline flushes via tube feed stopped. D5 infusion and lasix given last week. Na+ stable this week     Malnutrition:    - Level of malnutrition: Severe   - Based on: moderate/severe subcutaneous fat loss, moderate/severe muscle loss  - continuous tube feeding. Dietician following      Anemia: hemoglobin stable 10.8. Continue to " monitor.      Essential hypertension: BP controlled. On lisinopril     Severe debility, weakness, critical illness, deconditioning, Continue PT/OT     Insomnia: has been on Ritalin which was decreased to once daily due to insomnia when taken later in day. Switching to Nuvigil in AM to see if that helps.  - Continue nighttime mirtazapine 7.5 mg scheduled.    Slow transit constipation  -Constipated last week.  This improved with milk of magnesia, bisacodyl suppository and 1 dose of MiraLAX.  Currently resolved.      Diet: Adult Formula Drip Feeding: Continuous [employer] Peptide 1.5; Gastrostomy; Goal Rate: 90 mL/hr; Medication - Feeding Tube Flush Frequency: At least 15-30 mL water before and after medication administration and with tube clogging   DVT Prophylaxis: Heparin SQ   Gutierrez Catheter: Not present   Central Lines: None   Code Status: Full Code         Disposition Plan   Expected discharge: to be determined. Barriers: bilateral hand mitts and frequent trach suctioning    ___________________________________________________________________    Interval History   Spoke with nurse - didn't sleep well.  Tired today. When hand mitts off patient is touching and picking at trach site. Tolerating being up in the chair during the day. .     Data reviewed today: I reviewed all medications, new labs and imaging results over the last 24 hours. I personally reviewed no images or EKG's today.    Physical Exam   Vital Signs: Temp: 99.3  F (37.4  C) Temp src: Axillary BP: 110/84 Pulse: 91   Resp: 24 SpO2: 95 % O2 Device: Trach dome Oxygen Delivery: 20 LPM  Weight: 159 lbs 9.6 oz  General Appearance:  Alert, in chair, gesturing with right hand, no apparent distress   Respiratory: coarse anterior breath sounds, no wheezing or crackles, tracheostomy patent with no visible secretions, PMV in place, surrounding skin around trach covered with gauze, no bleeding around trach site   Cardiovascular: S1,S2, rhythm rate regular, negative  murmur   GI: Bowel sounds positive x 4, non distended non tender, G/J tube appears patent with tube feeding infusing, surrounding skin dry/intact  Skin: pink dry and intact  Neurological: alert and oriented x 1, smiles, whispers/mouths words when speaking valve in,  partially understandable, moving bilaterally upper extremities, right greater than left with chronic left sided neglet, does follow a few simple commands but is somewhat inconsistent    Data   Recent Labs   Lab 08/27/21  0808 08/24/21  0552 08/23/21  0653   WBC  --   --  8.1   HGB  --   --  10.8*   MCV  --   --  99   PLT  --   --  377    142 145   POTASSIUM 4.6 4.6 4.5   CHLORIDE 101 103 105   CO2 27 28 28   BUN 31* 44* 46*   CR 0.84 0.88 1.00   ANIONGAP 11 11 12   RAGINI 10.6* 10.4 10.9*    105 123     Medications     dextrose       sodium chloride         acetylcysteine  2 mL Nebulization BID     [START ON 8/28/2021] armodafinil  150 mg Per Feeding Tube QAM     famotidine  20 mg Oral or Feeding Tube BID     heparin ANTICOAGULANT  5,000 Units Subcutaneous Q8H     ipratropium - albuterol 0.5 mg/2.5 mg/3 mL  3 mL Nebulization BID     lisinopril  40 mg Oral or Feeding Tube Daily     menthol-zinc oxide   Topical TID     mirtazapine  7.5 mg Per Feeding Tube At Bedtime     scopolamine  1 patch Transdermal Q72H    And     scopolamine   Transdermal Q8H     venlafaxine  50 mg Per Feeding Tube TID w/meals     Restraint:     Patient pulls his IV line and trach.      Within an hour after restraint an in person face to face assessment was completed at 8:00 AM, including an evaluation of the patient's immediate reaction to the intervention, behavioral assessment and review/assessment of history, drugs and medications, recent labs, etc., and behavioral condition.  The patient experienced no adverse physical outcome from seclusion/restraint initiation.  The intervention of restraint or seclusion needs to continue

## 2021-08-28 ENCOUNTER — APPOINTMENT (OUTPATIENT)
Dept: SPEECH THERAPY | Facility: CLINIC | Age: 71
DRG: 207 | End: 2021-08-28
Attending: HOSPITALIST
Payer: COMMERCIAL

## 2021-08-28 PROCEDURE — 99233 SBSQ HOSP IP/OBS HIGH 50: CPT | Performed by: INTERNAL MEDICINE

## 2021-08-28 PROCEDURE — 250N000009 HC RX 250: Performed by: INTERNAL MEDICINE

## 2021-08-28 PROCEDURE — 92507 TX SP LANG VOICE COMM INDIV: CPT | Mod: GN

## 2021-08-28 PROCEDURE — 250N000013 HC RX MED GY IP 250 OP 250 PS 637

## 2021-08-28 PROCEDURE — 999N000157 HC STATISTIC RCP TIME EA 10 MIN

## 2021-08-28 PROCEDURE — 94668 MNPJ CHEST WALL SBSQ: CPT

## 2021-08-28 PROCEDURE — 94640 AIRWAY INHALATION TREATMENT: CPT | Mod: 76

## 2021-08-28 PROCEDURE — 250N000013 HC RX MED GY IP 250 OP 250 PS 637: Performed by: INTERNAL MEDICINE

## 2021-08-28 PROCEDURE — 120N000017 HC R&B RESPIRATORY CARE

## 2021-08-28 PROCEDURE — 250N000011 HC RX IP 250 OP 636

## 2021-08-28 PROCEDURE — 999N000123 HC STATISTIC OXYGEN O2DAILY TECH TIME

## 2021-08-28 PROCEDURE — 250N000013 HC RX MED GY IP 250 OP 250 PS 637: Performed by: HOSPITALIST

## 2021-08-28 PROCEDURE — 94640 AIRWAY INHALATION TREATMENT: CPT

## 2021-08-28 PROCEDURE — 250N000013 HC RX MED GY IP 250 OP 250 PS 637: Performed by: NURSE PRACTITIONER

## 2021-08-28 PROCEDURE — 94660 CPAP INITIATION&MGMT: CPT

## 2021-08-28 PROCEDURE — 999N000009 HC STATISTIC AIRWAY CARE

## 2021-08-28 RX ADMIN — FAMOTIDINE 20 MG: 20 TABLET, FILM COATED ORAL at 20:32

## 2021-08-28 RX ADMIN — IPRATROPIUM BROMIDE AND ALBUTEROL SULFATE 3 ML: 2.5; .5 SOLUTION RESPIRATORY (INHALATION) at 07:57

## 2021-08-28 RX ADMIN — VENLAFAXINE 50 MG: 25 TABLET ORAL at 09:26

## 2021-08-28 RX ADMIN — LISINOPRIL 40 MG: 20 TABLET ORAL at 09:26

## 2021-08-28 RX ADMIN — ACETYLCYSTEINE 2 ML: 200 SOLUTION ORAL; RESPIRATORY (INHALATION) at 19:15

## 2021-08-28 RX ADMIN — SCOPALAMINE 1 PATCH: 1 PATCH, EXTENDED RELEASE TRANSDERMAL at 14:47

## 2021-08-28 RX ADMIN — ACETYLCYSTEINE 2 ML: 200 SOLUTION ORAL; RESPIRATORY (INHALATION) at 07:59

## 2021-08-28 RX ADMIN — MIRTAZAPINE 7.5 MG: 7.5 TABLET ORAL at 20:32

## 2021-08-28 RX ADMIN — FAMOTIDINE 20 MG: 20 TABLET, FILM COATED ORAL at 09:26

## 2021-08-28 RX ADMIN — HEPARIN SODIUM 5000 UNITS: 5000 INJECTION, SOLUTION INTRAVENOUS; SUBCUTANEOUS at 06:18

## 2021-08-28 RX ADMIN — HEPARIN SODIUM 5000 UNITS: 5000 INJECTION, SOLUTION INTRAVENOUS; SUBCUTANEOUS at 21:00

## 2021-08-28 RX ADMIN — ANORECTAL OINTMENT: 15.7; .44; 24; 20.6 OINTMENT TOPICAL at 09:26

## 2021-08-28 RX ADMIN — ANORECTAL OINTMENT: 15.7; .44; 24; 20.6 OINTMENT TOPICAL at 13:21

## 2021-08-28 RX ADMIN — VENLAFAXINE 50 MG: 25 TABLET ORAL at 17:23

## 2021-08-28 RX ADMIN — HEPARIN SODIUM 5000 UNITS: 5000 INJECTION, SOLUTION INTRAVENOUS; SUBCUTANEOUS at 13:21

## 2021-08-28 RX ADMIN — VENLAFAXINE 50 MG: 25 TABLET ORAL at 13:21

## 2021-08-28 RX ADMIN — IPRATROPIUM BROMIDE AND ALBUTEROL SULFATE 3 ML: 2.5; .5 SOLUTION RESPIRATORY (INHALATION) at 19:15

## 2021-08-28 RX ADMIN — ANORECTAL OINTMENT: 15.7; .44; 24; 20.6 OINTMENT TOPICAL at 20:33

## 2021-08-28 RX ADMIN — ARMODAFINIL 150 MG: 150 TABLET ORAL at 06:18

## 2021-08-28 ASSESSMENT — MIFFLIN-ST. JEOR: SCORE: 1479.52

## 2021-08-28 NOTE — PROGRESS NOTES
[facility]    Medicine Progress Note - Hospitalist Service       Date of Admission:  6/11/2021    Assessment & Plan          Summary:     Dayron Neri is a 70 year male with h/o HTN who presented to ED on 5/15/2021 with acute SAH after a fall with unresponsiveness.  He underwent emergent left external ventricular drain placement.  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  On 5/28/2021, patient had persistent cerebral vasospasm, CT head showed bilateral BAYLEE infarct and was treated with milrinone and verapamil.  On 6/1/2021, angiogram showed no evidence of vasospasm.  Extubated on 6/3/2021.  6/6/2021 he was reintubated due to hypoxia.  On 6/7/2021, patient underwent tracheostomy and PEG tube placement.  EVD removed 6/9/2021.   Patient was treated for possible pneumonia with Unasyn on 5/20/2021, It was switched to ceftriaxone the next day for sputum culture growing Klebsiella. On 5/23, sputum culture also grew Pseudomonas. Antibiotic was switched to Zosyn.  Due to increased WBCs in CSF, possibility of ventriculitis was raised. CSF culture was negative. Antibiotic was switched to cefepime and vancomycin on 5/28/2021 for 2 weeks.  Vanco was discontinued on 6/9/2021.  On 6/5/2021, sputum grew ESBL so cefepime was switched to meropenem. He was transferred to [facility] on 6/11/21.      Repeat sputum culture 6/19 showed MDR pseudomonas and he was started on tobramycin nebs from 6/24/2021 - 7/9/2021 .  7/8/2021 aspirated tube feeds. Spiked a fever that evening 102.8 and WBC went up to 36.9 the next morning so ID reconsulted and was started on ceftazadime-avibactam along with flagyl 7/9/2021 - 7/18/21. He has had repeated bouts of Klebsiella infection in sputum and ventilator associated pneumonia.  Additionally he has had a large amount of tracheal secretions, significant cognitive impairment, left-sided neglect and ongoing episodes of  agitation.  Patient's family has been monitoring the patient continuously during the daytime hours via video monitor and visit with him in person over the weekends.     8/28/21:   Discussed with neuro - started Nuvigil today instead of ritalin.  Respiratory status - Phase 2 Wean - FiO2 21-30%, 20 liters/minute. PMV trialing continuous.    On Duo-neb four times a day for pulmonary hygiene. Stopped 3% saline nebs 8/27/21 per pulmonary   Still having significant secretions, Frequent suctioning 4-6 x/shift. More often during the night. On scopolamine patch  Barriers to discharge: still needing high flow oxygen, frequent suctioning and needing mitt restraints      Assessment & Plan     Acute on chronic hypoxemic respiratory failure:    S/p treatment pseudomonas ESBL and klebsiella pneumonia.           - excessive thick white secretions is the main barrier to decannulation           - glycopyrolate was d/thang earlier this month per pulmonary medicine   - PVR during the day and patient is tolerating. Trach dome and cuff deflated at night. Weaning has been slow and the biggest barrier for decannulation is excessive secretions  - Continue to wean per RT and pulmonary medicine   - Continue  duonebs, acetylcysteine, stopping saline nebs  - Continue Scopolamine (started 7/20/2021).  Pulmonology not recommending increase in Scopolamine patch as this may increase anticholinergic effect.       Acute metabolic encephalopathy/cognitive impairment:  - Likely most of what we are seeing now is chronic due to frontal lobe injury.  Wife is still hopeful he will improve but its unclear if he has the capacity to focus and learn what they are attempting to teach him during therapy.  - Continue effexor, switch to provigil and see if that helps  - Patient on Effexor for agitation, increased on 8/18 to 50 mg three times daily      Ventilator associated pneumonia: Completed antibiotics on July 18.     MDR (multi-drug resistant) Pseudomonas  "tracheobronchitis/colonization: Has persistent tracheal secretions. sputum culture 6/19 showed MDR pseudomonas. completed Tobramycin nebs 6/24/2021 - 7/9/2021.   - Continue pulmonary toilet, scheduled duonebs.      Traumatic brain injury/intracranial hemorrage: 6/14/21 CT head done for fatigue and read as slight increase in caliber of lateral and third ventricle with possibility of developing communicating hydrocephalus. Discussed with Dr. Casillas (neurosurgeon at [facility]), who did not think there was much change. CT head repeated on 6/16/21, which did not show any change.  - Need follow up with Dr. Martinez with Neurosurgery in 3 months with repeat CTA of head and neck for post-op f/u. Next CTA of head and neck mid September      Dysphagia:   -Patient failed the blue dye test 7/20/21 with immediate aspiration, continue to use tube feeds.  Did pass the blue dye test a week later but still high risk for aspiration.    - Speech therapy following. See note from 8/17/21. Per speech therapist, despite several weeks of working with patient there has been \"no functional improvement in patient's ability to manage his oropharyngeal secretions.\" Speech discussed this with wife. Intensive dysphagia treatment has been discontinued and speech therapy is still continuing to follow   - Still having significant secretions, keep NPO with tube feeds due to concerns with aspiration.    Hypercalcemia   - Calcium has been high normal since early July averaging 10.4-10.9. Ca+ 10.6 last check today,  Ionized calcium 1.4-1.45 on 8/11/21, Vitamin D level 30, PTH 26   - Nephrology following   - likely secondary to prolonged hospitalization/immobilization and CKD III based on 24 hour clearance estimating GFR 45  - Tube feeds were adjusted to 800 mg less of calcium per day    Chronic kidney disease (CKD) stage 3a  - creat stable to improved (even though serum creat appears ok, his 24 hour urine shows renal decline and given decreased muscle mass his " serum creatinine should be lower than it is if his renal function were normal)).    Hypernatremia  - resolved    Malnutrition:    - Level of malnutrition: Severe   - Based on: moderate/severe subcutaneous fat loss, moderate/severe muscle loss  - continuous tube feeding. Dietician following      Anemia: hemoglobin stable 10.8. Continue to monitor.      Essential hypertension: BP controlled. On lisinopril     Severe debility, weakness, critical illness, deconditioning, Continue PT/OT     Insomnia:   - Continue nighttime mirtazapine 7.5 mg scheduled.  - follow now that we have switched to Nuvigil    Slow transit constipation  -Constipated in past.  Continue as needed bowel regimen     Diet: Adult Formula Drip Feeding: Continuous [employer] Peptide 1.5; Gastrostomy; Goal Rate: 90 mL/hr; Medication - Feeding Tube Flush Frequency: At least 15-30 mL water before and after medication administration and with tube clogging   DVT Prophylaxis: Heparin SQ   Gutierrez Catheter: Not present   Central Lines: None   Code Status: Full Code         Disposition Plan   Expected discharge: to be determined. Barriers: bilateral hand mitts and frequent trach suctioning    ___________________________________________________________________    Interval History   No new changes, restless night.  Patient denies any pain or problems.     Data reviewed today: I reviewed all medications, new labs and imaging results over the last 24 hours. I personally reviewed no images or EKG's today.    Physical Exam   Vital Signs: Temp: 98.8  F (37.1  C) Temp src: Axillary BP: 139/78 Pulse: 90   Resp: 16 SpO2: 95 % O2 Device: Trach dome Oxygen Delivery: 20 LPM  Weight: 151 lbs 9.6 oz  General Appearance:  Alert, in chair, gesturing with right hand, no apparent distress   Respiratory: coarse anterior breath sounds, no wheezing or crackles, tracheostomy patent with no visible secretions, PMV in place, surrounding skin around trach covered with gauze, no bleeding  around trach site   Cardiovascular: S1,S2, rhythm rate regular, negative murmur   GI: Bowel sounds positive x 4, non distended non tender, G/J tube appears patent with tube feeding infusing, surrounding skin dry/intact  Skin: pink dry and intact  Neurological: alert and oriented x 1, smiles, whispers/mouths words when speaking valve in,  partially understandable, moving bilaterally upper extremities, right greater than left with chronic left sided neglet, does follow a few simple commands but is somewhat inconsistent    Data   Recent Labs   Lab 08/27/21  0808 08/24/21  0552 08/23/21  0653   WBC  --   --  8.1   HGB  --   --  10.8*   MCV  --   --  99   PLT  --   --  377    142 145   POTASSIUM 4.6 4.6 4.5   CHLORIDE 101 103 105   CO2 27 28 28   BUN 31* 44* 46*   CR 0.84 0.88 1.00   ANIONGAP 11 11 12   RAGINI 10.6* 10.4 10.9*    105 123     Medications     dextrose       sodium chloride         acetylcysteine  2 mL Nebulization BID     armodafinil  150 mg Per Feeding Tube QAM     famotidine  20 mg Oral or Feeding Tube BID     heparin ANTICOAGULANT  5,000 Units Subcutaneous Q8H     ipratropium - albuterol 0.5 mg/2.5 mg/3 mL  3 mL Nebulization BID     lisinopril  40 mg Oral or Feeding Tube Daily     menthol-zinc oxide   Topical TID     mirtazapine  7.5 mg Per Feeding Tube At Bedtime     scopolamine  1 patch Transdermal Q72H    And     scopolamine   Transdermal Q8H     venlafaxine  50 mg Per Feeding Tube TID w/meals     Restraint:     Patient pulls his IV line and trach.      Within an hour after restraint an in person face to face assessment was completed at 8:00 AM, including an evaluation of the patient's immediate reaction to the intervention, behavioral assessment and review/assessment of history, drugs and medications, recent labs, etc., and behavioral condition.  The patient experienced no adverse physical outcome from seclusion/restraint initiation.  The intervention of restraint or seclusion needs to  continue

## 2021-08-28 NOTE — PLAN OF CARE
Problem: Device-Related Complication Risk (Artificial Airway)  Goal: Optimal Device Function  Outcome: Improving     Problem: Skin and Tissue Injury (Artificial Airway)  Goal: Absence of Device-Related Skin or Tissue Injury  Outcome: Improving     Problem: Communication Impairment (Artificial Airway)  Goal: Effective Communication  Outcome: Improving       RT PROGRESS NOTE     DATA:     CURRENT SETTINGS:             TRACH TYPE / SIZE:  #6 Bivona placed on 7/28/21             MODE:   HFTM/PMV             FIO2:   25% and 20 lpm     ACTION:             THERAPIES:   MUCOMYST BID/DUO NEB BID/NaCl/META NEB BID             SUCTION:                           FREQUENCY:  X3                        AMOUNT:   Large to small                        CONSISTENCY:   Thick                        COLOR:   White/yellow             SPONTANEOUS COUGH EFFORT/STRENGTH OF EFFORT (not elicited by suctioning): Yes:Strong                              WEANING PHASE:  #2                        WEAN MODE:   hftm/pmv 21% and 20 lpm as reji                        WEAN TIME: PMV Since 07:22 am                        END WEAN REASON:  Cont     RESPONSE:             BS:  Coarse             VITAL SIGNS:   SAT 93-95%, HR  , RR 22-24             EMOTIONAL NEEDS / CONCERNS:  N/A                RISK FOR SELF DECANNULATION:  Yes                        RISK DUE TO:  Restless                        INTERVENTION/S IN PLACE IS/ARE:  Bilateral mitts     NOTE / PLAN:   Pt is on 25% and 20 lpm TM/PMV, reji well. Cont current therapy and keep sat >/=90%

## 2021-08-28 NOTE — PLAN OF CARE
Problem: Adult Inpatient Plan of Care  Goal: Optimal Comfort and Wellbeing  Outcome: Improving   Patient had a satisfactory day , calm and cooperative with all cares.. Appears comfortable and improving in all areas.

## 2021-08-28 NOTE — PLAN OF CARE
Problem: Restraint for Non-Violent/Non-Self-Destructive Behavior  Goal: Prevent/Manage Potential Problems  Description: Maintain safety of patient and others during period of restraint.  Promote psychological and physical wellbeing.  Prevent injury to skin and involved body parts.  Promote nutrition, hydration, and elimination.  Outcome: No Change     Problem: Adult Inpatient Plan of Care  Goal: Plan of Care Review  Outcome: Improving     Problem: Adult Inpatient Plan of Care  Goal: Patient-Specific Goal (Individualized)  Outcome: Improving  Pt remains on Mitts x2 restraints for safety. He got up to the chair once. Suctioned x1. No PRNs given.

## 2021-08-28 NOTE — PLAN OF CARE
Problem: Device-Related Complication Risk (Mechanical Ventilation, Invasive)  Goal: Optimal Device Function  Intervention: Optimize Device Care and Function  Recent Flowsheet Documentation  Taken 8/28/2021 1500 by Tye Samayoa, RT  Airway Safety Measures: all equipment/monitors on and audible     Problem: Device-Related Complication Risk (Artificial Airway)  Goal: Optimal Device Function  Outcome: Improving  Intervention: Optimize Device Care and Function  Recent Flowsheet Documentation  Taken 8/28/2021 1500 by Tye Samayoa, RT  Airway Safety Measures: all equipment/monitors on and audible     Problem: Skin and Tissue Injury (Artificial Airway)  Goal: Absence of Device-Related Skin or Tissue Injury  Outcome: Improving     Problem: Communication Impairment (Artificial Airway)  Goal: Effective Communication  Outcome: Improving   RT PROGRESS NOTE     DATA:     CURRENT SETTINGS: tm/heated humidity              TRACH TYPE / SIZE:  Bivona, # 6 trach              MODE:   tm             FIO2:   21%     ACTION:             THERAPIES:   Mucomyst/Duo-Neb bid              SUCTION:  yes                         FREQUENCY:   x 3                         AMOUNT:   large                         CONSISTENCY:   thick                         COLOR:   yellow              SPONTANEOUS COUGH EFFORT/STRENGTH OF EFFORT (not elicited by suctioning): fair                              WEANING PHASE:   phase two vent pathway                         WEAN MODE:    trach dome / with pmv continuous                        WEAN TIME:   continuous                         END WEAN REASON:   ongoing      RESPONSE:             BS:   clear with suction              VITAL SIGNS:   95/90/18-22             EMOTIONAL NEEDS / CONCERNS:  none                RISK FOR SELF DECANNULATION:  none                        RISK DUE TO:  n/a                        INTERVENTION/S IN PLACE IS/ARE:  n/a       NOTE / PLAN:     -Continue current  , benefits from  Pulmonary hygiene, continues to benefit from Nebulizer therapies.

## 2021-08-28 NOTE — PLAN OF CARE
Problem: Restraint for Non-Violent/Non-Self-Destructive Behavior  Goal: Prevent/Manage Potential Problems  Description: Maintain safety of patient and others during period of restraint.  Promote psychological and physical wellbeing.  Prevent injury to skin and involved body parts.  Promote nutrition, hydration, and elimination.  Outcome: No Change     Problem: Skin and Tissue Injury (Artificial Airway)  Goal: Absence of Device-Related Skin or Tissue Injury  Outcome: No Change   Pt slept intermittently, restraints kept in place for pt safety. Very little coughing last night. Had bm, tolerated repositioning every two hrs.

## 2021-08-28 NOTE — PROGRESS NOTES
RENAL PROGRESS NOTE    Following patient peripherally over the weekend.  Chart checked.  Nephrology plans to see patient on 8/30/2021    Please call if any questions or concerns in the meantime.      Elpidio Rowley PA-C  [facility]  [phone]

## 2021-08-29 PROCEDURE — 250N000013 HC RX MED GY IP 250 OP 250 PS 637: Performed by: NURSE PRACTITIONER

## 2021-08-29 PROCEDURE — 250N000009 HC RX 250: Performed by: INTERNAL MEDICINE

## 2021-08-29 PROCEDURE — 250N000011 HC RX IP 250 OP 636

## 2021-08-29 PROCEDURE — 99232 SBSQ HOSP IP/OBS MODERATE 35: CPT | Performed by: INTERNAL MEDICINE

## 2021-08-29 PROCEDURE — 94640 AIRWAY INHALATION TREATMENT: CPT

## 2021-08-29 PROCEDURE — 94668 MNPJ CHEST WALL SBSQ: CPT

## 2021-08-29 PROCEDURE — 94660 CPAP INITIATION&MGMT: CPT

## 2021-08-29 PROCEDURE — 999N000009 HC STATISTIC AIRWAY CARE

## 2021-08-29 PROCEDURE — 250N000013 HC RX MED GY IP 250 OP 250 PS 637

## 2021-08-29 PROCEDURE — 120N000017 HC R&B RESPIRATORY CARE

## 2021-08-29 PROCEDURE — 250N000013 HC RX MED GY IP 250 OP 250 PS 637: Performed by: INTERNAL MEDICINE

## 2021-08-29 PROCEDURE — 250N000013 HC RX MED GY IP 250 OP 250 PS 637: Performed by: HOSPITALIST

## 2021-08-29 PROCEDURE — 999N000157 HC STATISTIC RCP TIME EA 10 MIN

## 2021-08-29 PROCEDURE — 31600 PLANNED TRACHEOSTOMY: CPT

## 2021-08-29 PROCEDURE — 999N000123 HC STATISTIC OXYGEN O2DAILY TECH TIME

## 2021-08-29 RX ADMIN — VENLAFAXINE 50 MG: 25 TABLET ORAL at 16:08

## 2021-08-29 RX ADMIN — ANORECTAL OINTMENT: 15.7; .44; 24; 20.6 OINTMENT TOPICAL at 08:26

## 2021-08-29 RX ADMIN — VENLAFAXINE 50 MG: 25 TABLET ORAL at 12:30

## 2021-08-29 RX ADMIN — FAMOTIDINE 20 MG: 20 TABLET, FILM COATED ORAL at 08:26

## 2021-08-29 RX ADMIN — IPRATROPIUM BROMIDE AND ALBUTEROL SULFATE 3 ML: 2.5; .5 SOLUTION RESPIRATORY (INHALATION) at 19:42

## 2021-08-29 RX ADMIN — ACETYLCYSTEINE 2 ML: 200 SOLUTION ORAL; RESPIRATORY (INHALATION) at 07:07

## 2021-08-29 RX ADMIN — ANORECTAL OINTMENT: 15.7; .44; 24; 20.6 OINTMENT TOPICAL at 21:23

## 2021-08-29 RX ADMIN — IPRATROPIUM BROMIDE AND ALBUTEROL SULFATE 3 ML: 2.5; .5 SOLUTION RESPIRATORY (INHALATION) at 07:07

## 2021-08-29 RX ADMIN — ANORECTAL OINTMENT: 15.7; .44; 24; 20.6 OINTMENT TOPICAL at 14:14

## 2021-08-29 RX ADMIN — VENLAFAXINE 50 MG: 25 TABLET ORAL at 08:26

## 2021-08-29 RX ADMIN — HEPARIN SODIUM 5000 UNITS: 5000 INJECTION, SOLUTION INTRAVENOUS; SUBCUTANEOUS at 14:16

## 2021-08-29 RX ADMIN — FAMOTIDINE 20 MG: 20 TABLET, FILM COATED ORAL at 21:22

## 2021-08-29 RX ADMIN — ACETYLCYSTEINE 2 ML: 200 SOLUTION ORAL; RESPIRATORY (INHALATION) at 19:42

## 2021-08-29 RX ADMIN — ARMODAFINIL 150 MG: 150 TABLET ORAL at 05:27

## 2021-08-29 RX ADMIN — HEPARIN SODIUM 5000 UNITS: 5000 INJECTION, SOLUTION INTRAVENOUS; SUBCUTANEOUS at 05:27

## 2021-08-29 RX ADMIN — LISINOPRIL 40 MG: 20 TABLET ORAL at 08:26

## 2021-08-29 RX ADMIN — MIRTAZAPINE 7.5 MG: 7.5 TABLET ORAL at 21:22

## 2021-08-29 RX ADMIN — HEPARIN SODIUM 5000 UNITS: 5000 INJECTION, SOLUTION INTRAVENOUS; SUBCUTANEOUS at 21:22

## 2021-08-29 ASSESSMENT — MIFFLIN-ST. JEOR
SCORE: 1470.9
SCORE: 1469.71

## 2021-08-29 NOTE — PLAN OF CARE
Problem: Device-Related Complication Risk (Artificial Airway)  Goal: Optimal Device Function  Outcome: Improving     Problem: Skin and Tissue Injury (Artificial Airway)  Goal: Absence of Device-Related Skin or Tissue Injury  Outcome: Improving     Problem: Communication Impairment (Artificial Airway)  Goal: Effective Communication  Outcome: Improving       RT PROGRESS NOTE     DATA:     CURRENT SETTINGS:             TRACH TYPE / SIZE:  #6 Bivona placed on 7/28/21             MODE:   TM/PMV             FIO2:   21% and 20 lpm     ACTION:             THERAPIES:   MUCOMYST BID/DUO NEB BID/NaCl/META NEB BID             SUCTION:                           FREQUENCY:  X3                        AMOUNT:   Moderate to large with lavage                        CONSISTENCY:   Thick                        COLOR:   White/yellow             SPONTANEOUS COUGH EFFORT/STRENGTH OF EFFORT (not elicited by suctioning): Yes:Strong                              WEANING PHASE:  #2                        WEAN MODE:   hftm/pmv 21% and 20 lpm as reji                        WEAN TIME: PMV Since 8/27@ 07:22 am                        END WEAN REASON:  Cont     RESPONSE:             BS:  Coarse             VITAL SIGNS:   SAT 92-95%, HR 82-93 , RR 20-24             EMOTIONAL NEEDS / CONCERNS:  N/A                RISK FOR SELF DECANNULATION:  Yes                        RISK DUE TO:  Restless                        INTERVENTION/S IN PLACE IS/ARE:  Bilateral mitts     NOTE / PLAN:  Pt is on 21% tm/pmv, reji well. Cont current therapy and keep sat >/=90%

## 2021-08-29 NOTE — PROGRESS NOTES
RENAL chart check.  We will f/u with pt and full labs tomorrow.  Na trending lower, will cut back on FWF today.   Flavia New MD  [facility]  [phone]

## 2021-08-29 NOTE — PROGRESS NOTES
[facility]    Medicine Progress Note - Hospitalist Service       Date of Admission:  6/11/2021    Summary:  Dayron Neri is a 70 year male with h/o HTN who presented to ED on 5/15/2021 with acute SAH after a fall with unresponsiveness.  He underwent emergent left external ventricular drain placement.  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  On 5/28/2021, patient had persistent cerebral vasospasm, CT head showed bilateral BAYLEE infarct and was treated with milrinone and verapamil.  On 6/1/2021, angiogram showed no evidence of vasospasm.  Extubated on 6/3/2021.  6/6/2021 he was reintubated due to hypoxia.  On 6/7/2021, patient underwent tracheostomy and PEG tube placement.  EVD removed 6/9/2021.   Patient was treated for possible pneumonia with Unasyn on 5/20/2021, It was switched to ceftriaxone the next day for sputum culture growing Klebsiella. On 5/23, sputum culture also grew Pseudomonas. Antibiotic was switched to Zosyn.  Due to increased WBCs in CSF, possibility of ventriculitis was raised. CSF culture was negative. Antibiotic was switched to cefepime and vancomycin on 5/28/2021 for 2 weeks.  Vanco was discontinued on 6/9/2021.  On 6/5/2021, sputum grew ESBL so cefepime was switched to meropenem. He was transferred to [facility] on 6/11/21.     Since admission, patient did not make significant progression for his neurology status. Trach weaning is also lack of progression due to his cognitive impairment.       Assessment & Plan           Acute on chronic hypoxemic respiratory failure: S/p treatment pseudomonas ESBL and klebsiella pneumonia. Has excessive secretions. Weaning has been slow. Per pulmonary, his cognition prevents him from improving further. His pulmonary status could improve more if his cognition improves.   - PVR during the day and patient is tolerating. Trach dome and cuff deflated at night. Weaning  "has been slow and the biggest barrier for decannulation is excessive secretions.   - Continue to wean per RT and pulmonary.   - Airway secretion: Glycopyrrolate was tried and stopped. 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 Last CT head done on 7/1/21, which shows resolution of previous multicompartment hemorrhage in the right frontal lobe and the ventricles.   - Need follow up with Dr. Martinez with Neurosurgery in 3 months with repeat CTA of head and neck for post-op f/u. Next CTA of head and neck mid September.     Acute metabolic encephalopathy/cognitive impairment: His neurology status is likely his new baselin due to frontal lobe injury.  Wife is still hopeful he will improve further but its unclear if he has the capacity to focus and learn what they are attempting to teach him during therapy.  - Continue effexor, switch to provigil and see if that helps  - Patient on Effexor for agitation, increased on 8/18 to 50 mg three times daily     Dysphagia: On tube feeds and NPO.   - Speech therapy following. Per note from 8/17/21, despite several weeks of working with patient there has been \"no functional improvement in patient's ability to manage his oropharyngeal secretions.\" Speech discussed this with wife. Intensive dysphagia treatment has been discontinued.     Severe malnutrition: has moderate/severe subcutaneous fat loss, moderate/severe muscle loss  - continuous tube feeding per dietician     Chronic kidney disease (CKD) stage 3a: Creatinine has been stable.    Anemia: hemoglobin stable. Continue to monitor.    Essential hypertension: BP controlled. 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 Sputum culture 6/19 showed MDR pseudomonas. On Tobramycin nebs 6/24/2021 - 7/9/2021.   - Continue pulmonary toilet, scheduled duonebs    Ventilator associated pneumonia: Completed antibiotics on July 18.    Severe debility, weakness, critical illness, deconditioning: Continue PT/OT    Hypercalcemia: unclear etiology, mild at this point. Parathyroid hormone normal. May be related to relative immobilization. Continue to monitor.    Insomnia: Continue nighttime mirtazapine 7.5 mg scheduled.       Diet: Adult Formula Drip Feeding: Continuous [employer] Peptide 1.5; Gastrostomy; Goal Rate: 90; mL/hr; Medication - Feeding Tube Flush Frequency: At least 15-30 mL water before and after medication administration and with tube clogging; Amount to Send ...    DVT Prophylaxis: Heparin SQ  Gutierrez Catheter: Not present  Central Lines: None  Code Status: Full Code      Disposition Plan   Expected discharge: to be determined    The patient's care was discussed with the Bedside Nurse and Care Coordinator/.    Yazmin Hooper MD  Hospitalist Service  [facility]  Securely message with the Vocera Web Console (learn more here)  Text page via [employer] Paging/Directory      ______________________________________________________________________    Interval History   When seen and examined this morning, patient was in bed. His wife was watching him over the video. Patient has mumble speech and hard to understand.     Data reviewed today: I reviewed all medications, new labs and imaging results over the last 24 hours.     Physical Exam   Vital Signs: Temp: 97.8  F (36.6  C) Temp src: Axillary BP: 113/77 Pulse: 91   Resp: 22 SpO2: 98 % O2 Device: Trach dome Oxygen Delivery: 10 LPM  Weight: 149 lbs 7 oz    General appearance: not in acute distress  HEENT: PERRL, EOMI. Trach in place.  Lungs: Clear breath sounds in bilateral lung fields  Cardiovascular: Regular rate and rhythm, normal S1-S2  Abdomen:  Soft, non tender, no distension  Musculoskeletal: No joint swelling  Skin: No rash and no edema  Neurology: Awake and alert. Mumble speech and hard to understand. Moves all four extremities.      Data   Recent Labs   Lab 08/27/21  0808 08/24/21  0552 08/23/21  0653   WBC  --   --  8.1   HGB  --   --  10.8*   MCV  --   --  99   PLT  --   --  377    142 145   POTASSIUM 4.6 4.6 4.5   CHLORIDE 101 103 105   CO2 27 28 28   BUN 31* 44* 46*   CR 0.84 0.88 1.00   ANIONGAP 11 11 12   RAGINI 10.6* 10.4 10.9*    105 123       Restraint:    Patient pulls his IV line and trach.     Within an hour after restraint an in person face to face assessment was completed, including an evaluation of the patient's immediate reaction to the intervention, behavioral assessment and review/assessment of history, drugs and medications, recent labs, etc., and behavioral condition.  The patient experienced: No adverse physical outcome from seclusion/restraint initiation.  The intervention of restraint or seclusion needs to continue.

## 2021-08-29 NOTE — PLAN OF CARE
Problem: Restraint for Non-Violent/Non-Self-Destructive Behavior  Goal: Prevent/Manage Potential Problems  Description: Maintain safety of patient and others during period of restraint.  Promote psychological and physical wellbeing.  Prevent injury to skin and involved body parts.  Promote nutrition, hydration, and elimination.  Outcome: No Change     Problem: Anxiety  Goal: Anxiety Reduction or Resolution  Outcome: Improving     Problem: Skin and Tissue Injury (Artificial Airway)  Goal: Absence of Device-Related Skin or Tissue Injury  Outcome: Improving   Vital signs were stable. Pt. does not appear to be in pain and was comfortable for the whole shift. Pt. was incontinent couple of times in the shift. Pt. is still on restraints for safety. Continue to monitor.   Margot Sargent RN

## 2021-08-29 NOTE — PLAN OF CARE
Problem: Communication Impairment (Artificial Airway)  Goal: Effective Communication  Outcome: Improving     Problem: Device-Related Complication Risk (Artificial Airway)  Goal: Optimal Device Function  Outcome: Improving     Problem: Skin and Tissue Injury (Artificial Airway)  Goal: Absence of Device-Related Skin or Tissue Injury  Outcome: Improving   RT PROGRESS NOTE     DATA:     CURRENT SETTINGS:               TRACH TYPE / SIZE: #6 BIVONA TTS Changed on 8/29/21             MODE: TM/PMV             FIO2:  21%@20 L/MIN     ACTION:             THERAPIES: DuoNeb/ Mucomyst/ MetaNeb BID                SUCTION:                           FREQUENCY: X 3                        AMOUNT: moderate to large                        CONSISTENCY: thick                        COLOR:    White /tan             SPONTANEOUS COUGH EFFORT/STRENGTH OF EFFORT (not elicited by suctioning): Fair                               WEANING PHASE: 2                        WEAN MODE: TM/PMV as reji                         WEAN TIME:  Cont.                        END WEAN REASON:      RESPONSE:             BS:  Decreased faint coarse              VITAL SIGNS: Sat 95-96%, HR 85-90 , RR 20-22             EMOTIONAL NEEDS / CONCERNS:confused               RISK FOR SELF DECANNULATION: yes                        RISK DUE TO:  Confused                         INTERVENTION/S IN PLACE IS/ARE: Bilateral  hand  Mittens      NOTE / PLAN:  Cont. Current plan of care

## 2021-08-29 NOTE — PLAN OF CARE
Problem: Adult Inpatient Plan of Care  Goal: Plan of Care Review  Outcome: No Change     Problem: Adult Inpatient Plan of Care  Goal: Patient-Specific Goal (Individualized)  Outcome: No Change     Problem: Adult Inpatient Plan of Care  Goal: Optimal Comfort and Wellbeing  Outcome: Improving  Pt remains on Mitts x2 restraints for safety. No PRNs given. He got up to the chair once during shift.

## 2021-08-30 ENCOUNTER — APPOINTMENT (OUTPATIENT)
Dept: OCCUPATIONAL THERAPY | Facility: CLINIC | Age: 71
DRG: 207 | End: 2021-08-30
Attending: HOSPITALIST
Payer: COMMERCIAL

## 2021-08-30 ENCOUNTER — APPOINTMENT (OUTPATIENT)
Dept: PHYSICAL THERAPY | Facility: CLINIC | Age: 71
DRG: 207 | End: 2021-08-30
Attending: HOSPITALIST
Payer: COMMERCIAL

## 2021-08-30 ENCOUNTER — APPOINTMENT (OUTPATIENT)
Dept: SPEECH THERAPY | Facility: CLINIC | Age: 71
DRG: 207 | End: 2021-08-30
Attending: HOSPITALIST
Payer: COMMERCIAL

## 2021-08-30 LAB
ALBUMIN SERPL-MCNC: 2.9 G/DL (ref 3.5–5)
ANION GAP SERPL CALCULATED.3IONS-SCNC: 10 MMOL/L (ref 5–18)
BASOPHILS # BLD AUTO: 0.1 10E3/UL (ref 0–0.2)
BASOPHILS NFR BLD AUTO: 1 %
BUN SERPL-MCNC: 34 MG/DL (ref 8–22)
CALCIUM SERPL-MCNC: 11.3 MG/DL (ref 8.5–10.5)
CHLORIDE BLD-SCNC: 101 MMOL/L (ref 98–107)
CO2 SERPL-SCNC: 31 MMOL/L (ref 22–31)
CREAT SERPL-MCNC: 0.9 MG/DL (ref 0.7–1.3)
EOSINOPHIL # BLD AUTO: 0.2 10E3/UL (ref 0–0.7)
EOSINOPHIL NFR BLD AUTO: 3 %
ERYTHROCYTE [DISTWIDTH] IN BLOOD BY AUTOMATED COUNT: 13.4 % (ref 10–15)
GFR SERPL CREATININE-BSD FRML MDRD: 87 ML/MIN/1.73M2
GLUCOSE BLD-MCNC: 113 MG/DL (ref 70–125)
HCT VFR BLD AUTO: 32 % (ref 40–53)
HGB BLD-MCNC: 10.5 G/DL (ref 13.3–17.7)
IMM GRANULOCYTES # BLD: 0.1 10E3/UL
IMM GRANULOCYTES NFR BLD: 1 %
LYMPHOCYTES # BLD AUTO: 1.4 10E3/UL (ref 0.8–5.3)
LYMPHOCYTES NFR BLD AUTO: 20 %
MAGNESIUM SERPL-MCNC: 2.1 MG/DL (ref 1.8–2.6)
MCH RBC QN AUTO: 31.8 PG (ref 26.5–33)
MCHC RBC AUTO-ENTMCNC: 32.8 G/DL (ref 31.5–36.5)
MCV RBC AUTO: 97 FL (ref 78–100)
MONOCYTES # BLD AUTO: 0.7 10E3/UL (ref 0–1.3)
MONOCYTES NFR BLD AUTO: 10 %
NEUTROPHILS # BLD AUTO: 4.7 10E3/UL (ref 1.6–8.3)
NEUTROPHILS NFR BLD AUTO: 65 %
NRBC # BLD AUTO: 0 10E3/UL
NRBC BLD AUTO-RTO: 0 /100
PHOSPHATE SERPL-MCNC: 4.2 MG/DL (ref 2.5–4.5)
PLATELET # BLD AUTO: 340 10E3/UL (ref 150–450)
POTASSIUM BLD-SCNC: 4.9 MMOL/L (ref 3.5–5)
RBC # BLD AUTO: 3.3 10E6/UL (ref 4.4–5.9)
SODIUM SERPL-SCNC: 142 MMOL/L (ref 136–145)
WBC # BLD AUTO: 7.1 10E3/UL (ref 4–11)

## 2021-08-30 PROCEDURE — 97530 THERAPEUTIC ACTIVITIES: CPT | Mod: GP | Performed by: PHYSICAL THERAPIST

## 2021-08-30 PROCEDURE — 80069 RENAL FUNCTION PANEL: CPT | Performed by: INTERNAL MEDICINE

## 2021-08-30 PROCEDURE — 99233 SBSQ HOSP IP/OBS HIGH 50: CPT | Performed by: NURSE PRACTITIONER

## 2021-08-30 PROCEDURE — 94660 CPAP INITIATION&MGMT: CPT

## 2021-08-30 PROCEDURE — 250N000013 HC RX MED GY IP 250 OP 250 PS 637

## 2021-08-30 PROCEDURE — 99232 SBSQ HOSP IP/OBS MODERATE 35: CPT | Performed by: INTERNAL MEDICINE

## 2021-08-30 PROCEDURE — 97530 THERAPEUTIC ACTIVITIES: CPT | Mod: GO

## 2021-08-30 PROCEDURE — 92526 ORAL FUNCTION THERAPY: CPT | Mod: GN

## 2021-08-30 PROCEDURE — 92507 TX SP LANG VOICE COMM INDIV: CPT | Mod: GN

## 2021-08-30 PROCEDURE — 85025 COMPLETE CBC W/AUTO DIFF WBC: CPT | Performed by: NURSE PRACTITIONER

## 2021-08-30 PROCEDURE — 250N000013 HC RX MED GY IP 250 OP 250 PS 637: Performed by: INTERNAL MEDICINE

## 2021-08-30 PROCEDURE — 97110 THERAPEUTIC EXERCISES: CPT | Mod: GP | Performed by: PHYSICAL THERAPIST

## 2021-08-30 PROCEDURE — 94640 AIRWAY INHALATION TREATMENT: CPT

## 2021-08-30 PROCEDURE — 120N000001 HC R&B MED SURG/OB

## 2021-08-30 PROCEDURE — 94640 AIRWAY INHALATION TREATMENT: CPT | Mod: 76

## 2021-08-30 PROCEDURE — 94003 VENT MGMT INPAT SUBQ DAY: CPT | Mod: RT | Performed by: INTERNAL MEDICINE

## 2021-08-30 PROCEDURE — 250N000013 HC RX MED GY IP 250 OP 250 PS 637: Performed by: NURSE PRACTITIONER

## 2021-08-30 PROCEDURE — 250N000011 HC RX IP 250 OP 636

## 2021-08-30 PROCEDURE — 97112 NEUROMUSCULAR REEDUCATION: CPT | Mod: GP | Performed by: PHYSICAL THERAPIST

## 2021-08-30 PROCEDURE — 250N000009 HC RX 250: Performed by: INTERNAL MEDICINE

## 2021-08-30 PROCEDURE — 94668 MNPJ CHEST WALL SBSQ: CPT

## 2021-08-30 PROCEDURE — 999N000157 HC STATISTIC RCP TIME EA 10 MIN

## 2021-08-30 PROCEDURE — 36415 COLL VENOUS BLD VENIPUNCTURE: CPT | Performed by: NURSE PRACTITIONER

## 2021-08-30 PROCEDURE — 99207 PR CDG-MDM COMPONENT: MEETS MODERATE - DOWN CODED: CPT | Performed by: INTERNAL MEDICINE

## 2021-08-30 PROCEDURE — 999N000009 HC STATISTIC AIRWAY CARE

## 2021-08-30 PROCEDURE — 250N000013 HC RX MED GY IP 250 OP 250 PS 637: Performed by: HOSPITALIST

## 2021-08-30 PROCEDURE — 83735 ASSAY OF MAGNESIUM: CPT | Performed by: INTERNAL MEDICINE

## 2021-08-30 RX ORDER — LOPERAMIDE HCL 1 MG/7.5ML
1 SUSPENSION ORAL 3 TIMES DAILY PRN
Status: DISCONTINUED | OUTPATIENT
Start: 2021-08-30 | End: 2021-09-27 | Stop reason: HOSPADM

## 2021-08-30 RX ADMIN — LISINOPRIL 40 MG: 20 TABLET ORAL at 08:06

## 2021-08-30 RX ADMIN — ARMODAFINIL 150 MG: 150 TABLET ORAL at 05:10

## 2021-08-30 RX ADMIN — ANORECTAL OINTMENT: 15.7; .44; 24; 20.6 OINTMENT TOPICAL at 08:07

## 2021-08-30 RX ADMIN — FAMOTIDINE 20 MG: 20 TABLET, FILM COATED ORAL at 08:06

## 2021-08-30 RX ADMIN — FAMOTIDINE 20 MG: 20 TABLET, FILM COATED ORAL at 21:47

## 2021-08-30 RX ADMIN — MIRTAZAPINE 7.5 MG: 7.5 TABLET ORAL at 21:47

## 2021-08-30 RX ADMIN — VENLAFAXINE 50 MG: 25 TABLET ORAL at 12:17

## 2021-08-30 RX ADMIN — HEPARIN SODIUM 5000 UNITS: 5000 INJECTION, SOLUTION INTRAVENOUS; SUBCUTANEOUS at 21:47

## 2021-08-30 RX ADMIN — HEPARIN SODIUM 5000 UNITS: 5000 INJECTION, SOLUTION INTRAVENOUS; SUBCUTANEOUS at 14:13

## 2021-08-30 RX ADMIN — ANORECTAL OINTMENT: 15.7; .44; 24; 20.6 OINTMENT TOPICAL at 14:13

## 2021-08-30 RX ADMIN — HEPARIN SODIUM 5000 UNITS: 5000 INJECTION, SOLUTION INTRAVENOUS; SUBCUTANEOUS at 05:10

## 2021-08-30 RX ADMIN — ACETYLCYSTEINE 2 ML: 200 SOLUTION ORAL; RESPIRATORY (INHALATION) at 08:38

## 2021-08-30 RX ADMIN — VENLAFAXINE 50 MG: 25 TABLET ORAL at 08:06

## 2021-08-30 RX ADMIN — VENLAFAXINE 50 MG: 25 TABLET ORAL at 16:09

## 2021-08-30 RX ADMIN — ANORECTAL OINTMENT: 15.7; .44; 24; 20.6 OINTMENT TOPICAL at 21:50

## 2021-08-30 RX ADMIN — IPRATROPIUM BROMIDE AND ALBUTEROL SULFATE 3 ML: 2.5; .5 SOLUTION RESPIRATORY (INHALATION) at 20:07

## 2021-08-30 RX ADMIN — IPRATROPIUM BROMIDE AND ALBUTEROL SULFATE 3 ML: 2.5; .5 SOLUTION RESPIRATORY (INHALATION) at 08:38

## 2021-08-30 RX ADMIN — ACETYLCYSTEINE 2 ML: 200 SOLUTION ORAL; RESPIRATORY (INHALATION) at 20:07

## 2021-08-30 ASSESSMENT — MIFFLIN-ST. JEOR: SCORE: 1470.9

## 2021-08-30 NOTE — PLAN OF CARE
Problem: Anxiety  Goal: Anxiety Reduction or Resolution  Outcome: No Change     Problem: Restraint for Non-Violent/Non-Self-Destructive Behavior  Goal: Prevent/Manage Potential Problems  Description: Maintain safety of patient and others during period of restraint.  Promote psychological and physical wellbeing.  Prevent injury to skin and involved body parts.  Promote nutrition, hydration, and elimination.  Outcome: No Change     Problem: Skin and Tissue Injury (Artificial Airway)  Goal: Absence of Device-Related Skin or Tissue Injury  Outcome: No Change   Pt was up in chair at about 0830 and attended therapies the whole morning. No S/S of pain observed. Wife was on ipad as usual monitoring him. She stated he was almost falling out of chair and no body was there for him then room change done close to the nurse station for better observation. He is on restraint and monitored for safety.

## 2021-08-30 NOTE — PROGRESS NOTES
[facility]    Medicine Progress Note - Hospitalist Service       Date of Admission:  6/11/2021    Summary:  Dayron Neri is a 70 year male with h/o HTN who presented to ED on 5/15/2021 with acute SAH after a fall with unresponsiveness.  He underwent emergent left external ventricular drain placement.  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  On 5/28/2021, patient had persistent cerebral vasospasm, CT head showed bilateral BAYLEE infarct and was treated with milrinone and verapamil.  On 6/1/2021, angiogram showed no evidence of vasospasm.  Extubated on 6/3/2021.  6/6/2021 he was reintubated due to hypoxia.  On 6/7/2021, patient underwent tracheostomy and PEG tube placement.  EVD removed 6/9/2021.   Patient was treated for possible pneumonia with Unasyn on 5/20/2021, It was switched to ceftriaxone the next day for sputum culture growing Klebsiella. On 5/23, sputum culture also grew Pseudomonas. Antibiotic was switched to Zosyn.  Due to increased WBCs in CSF, possibility of ventriculitis was raised. CSF culture was negative. Antibiotic was switched to cefepime and vancomycin on 5/28/2021 for 2 weeks.  Vanco was discontinued on 6/9/2021. On 6/5/2021, sputum grew ESBL so cefepime was switched to meropenem. [facility]     Since admission to [facility], patient did not make significant progression for his neurology status. Trach weaning is also lack of progression due to his cognitive impairment.       Assessment & Plan           Acute on chronic hypoxemic respiratory failure: S/p treatment pseudomonas ESBL and Klebsiella pneumonia. Has excessive secretions. Weaning has been slow. Per pulmonary, his cognition prevents him from improving further. His pulmonary status could improve more if his cognition improves.   - PVR during the day and patient is tolerating. Trach dome and cuff deflated at night.  "Weaning has been slow and the biggest barrier for decannulation is excessive secretions.   - Continue to wean per RT and pulmonary.   - Airway secretion: Glycopyrrolate was tried and stopped. 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 Last CT head done on 7/1/21, which shows resolution of previous multicompartment hemorrhage in the right frontal lobe and the ventricles.   - Need follow up with Dr. Martinez with Neurosurgery in 3 months with repeat CTA of head and neck for post-op f/u. Next CTA of head and neck mid September.     Acute metabolic encephalopathy/cognitive impairment: His current neurology status is likely his new baseline due to frontal lobe injury.  Wife is still hopeful he will improve further but it is unclear if he has the capacity to focus and learn what they are attempting to teach him during therapy.  - Continue Effexor and nuvigil     Dysphagia: On tube feeds and NPO.   - Speech therapy following. Per note from 8/17/21, despite several weeks of working with patient there has been \"no functional improvement in patient's ability to manage his oropharyngeal secretions.\" Speech discussed this with wife. Intensive dysphagia treatment has been discontinued.     Severe malnutrition: has moderate/severe subcutaneous fat loss, moderate/severe muscle loss  - continuous tube feeding per dietician     Chronic kidney disease (CKD) stage 3a: Creatinine has been stable.    Anemia: hemoglobin stable. Continue to monitor.    Essential hypertension: BP controlled. 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 Sputum culture 6/19 showed MDR pseudomonas. On Tobramycin " [facility] 6/24/2021 - 7/9/2021.   - Continue pulmonary toilet, scheduled duonebs    Ventilator associated pneumonia: Completed antibiotics on July 18.    Severe debility, weakness, critical illness, deconditioning: Continue PT/OT    Hypercalcemia: unclear etiology, mild at this point. Parathyroid hormone normal. May be related to relative immobilization. Continue to monitor.    Insomnia: Continue nighttime mirtazapine 7.5 mg scheduled.       Diet: Adult Formula Drip Feeding: Continuous [employer] Peptide 1.5; Gastrostomy; Goal Rate: 90; mL/hr; Medication - Feeding Tube Flush Frequency: At least 15-30 mL water before and after medication administration and with tube clogging; Amount to Send ...    DVT Prophylaxis: Heparin SQ  Gutierrez Catheter: Not present  Central Lines: None  Code Status: Full Code      Disposition Plan   Expected discharge: to be determined    The patient's care was discussed with the Bedside Nurse and Care Coordinator/.    Yazmin Hooper MD  Hospitalist [facility]  Securely message with the Vocera Web Console (learn more here)  Text page via [employer] Paging/Directory      ______________________________________________________________________    Interval History   I observed patient today during his physical therapy. Patient had slow mumbled speech and hard to understand. He does not consistently follow commands and cooperate with the training despite repetitive coaching. He needs two people assistance to transfer.     Data reviewed today: I reviewed all medications, new labs and imaging results over the last 24 hours.     Physical Exam   Vital Signs: Temp: 98.1  F (36.7  C) Temp src: Oral BP: 136/86 Pulse: 91   Resp: 21 SpO2: 93 % O2 Device: Trach dome Oxygen Delivery: 20 LPM  Weight: 149 lbs 11.2 oz    General appearance: not in acute distress  HEENT: PERRL, EOMI. Trach in place.  Lungs: Clear breath sounds in bilateral lung fields  Cardiovascular: Regular rate and rhythm,  normal S1-S2  Abdomen: Soft, non tender, no distension  Musculoskeletal: No joint swelling  Skin: No rash and no edema  Neurology: Awake and alert. Mumble speech and hard to understand. Moves all four extremities.      Data   Recent Labs   Lab 08/30/21  0634 08/27/21  0808 08/24/21  0552   WBC 7.1  --   --    HGB 10.5*  --   --    MCV 97  --   --      --   --     139 142   POTASSIUM 4.9 4.6 4.6   CHLORIDE 101 101 103   CO2 31 27 28   BUN 34* 31* 44*   CR 0.90 0.84 0.88   ANIONGAP 10 11 11   RAGINI 11.3* 10.6* 10.4    123 105   ALBUMIN 2.9*  --   --        Restraint:    Patient pulls his IV line and trach.     Within an hour after restraint an in person face to face assessment was completed, including an evaluation of the patient's immediate reaction to the intervention, behavioral assessment and review/assessment of history, drugs and medications, recent labs, etc., and behavioral condition.  The patient experienced: No adverse physical outcome from seclusion/restraint initiation.  The intervention of restraint or seclusion needs to continue.

## 2021-08-30 NOTE — PLAN OF CARE
Goal: Tracheostomy will be managed safely  Outcome: Progressing  Goal: Respiratory status - ventilation  Description: Movement of air in and out of the lungs.    Liberate from ventilator  Outcome: Progressing   RT PROGRESS NOTE   [id number]  DATA:     CURRENT SETTINGS:             TRACH TYPE / SIZE: #6 Bivona, changed on 8/29             MODE: TM 21% 20L              FIO2:        ACTION:             THERAPIES:   Duoneb/MucomystBID,MetanebBID,reji well             SUCTION:                           FREQUENCY:   X4 for lg p-yellow thick to scant                        AMOUNT:                           CONSISTENCY:                           COLOR:                SPONTANEOUS COUGH EFFORT/STRENGTH OF EFFORT (not elicited by suctioning): Yes                              WEANING PHASE: 2                        WEAN MODE: TM/ PMV cont  TM trials since 6/20                   WEAN TIME:                           END WEAN REASON:       RESPONSE:             BS: coarse to clr               VITAL SIGNS:                EMOTIONAL NEEDS / CONCERNS:                  RISK FOR SELF DECANNULATION:                          RISK DUE TO:                          INTERVENTION/S IN PLACE IS/ARE:         NOTE / PLAN:     Pt was moved today from 4104 to 4138  Phase 3 start in a.m.  Cont to monitor closely.

## 2021-08-30 NOTE — PLAN OF CARE
Problem: Device-Related Complication Risk (Artificial Airway)  Goal: Optimal Device Function  Outcome: No Change     Problem: Skin and Tissue Injury (Artificial Airway)  Goal: Absence of Device-Related Skin or Tissue Injury  Outcome: No Change     Respiratory Care Progress Note    Airway/oxygen:    Current settings: HFTM 20L 21%  Trach type/size: #6 Bivona placed on 7/28/2021    Actions:    Medications/therapies: Duoneb BIG, Mucomyst BID, Metaneb BID  Overnight suctioning   Frequency: 1x   Amount: large   Consistency: thick   Color: white   Spontaneous effort: good, strong    Weaning phase: 2  Daytime plan: HFTM 20L 21%  Nighttime plan: same    Response    Breath sounds: diminished  Vital signs: SpO2 94%, HR 88, RR 22  Emotional needs/concerns: none  Risk for self decannulation: yes   Risk due to: confusion   Intervention(s) in place: mitts bilateral hands    Note/Plan: continue plan of care

## 2021-08-30 NOTE — PROGRESS NOTES
RENAL PROGRESS NOTE    HPI: 70 year old male with past medical history of hypertension, admitted with subarachnoid hemorrhage after a fall on 5/15/2021 s/p emergent left external ventricular drain placement with posterior communicating artery aneurysm defied structured underwent craniotomy with clipping of aneurysm , complicated by persistent cerebral vasospasms and bilateral BAYLEE infarct.  Course also complicated by respiratory failure with intubation pneumonia was treated with cefepime and Vanco, transferred to LTAC on 6/11/2021.  Nephrology consulted for hypercalcemia on 8/15/2021     Hypercalcemia: moderate, d/t relative immobility with prolonged hospitalization, and CKD/reduced clearance  Corrected Ca 12's   PTH 26 on 8/11/2021, vitamin D level 30 8/15/2021  No history suggestive of any lymphoma or lymphadenopathy or any granulomatosis related disorders to suggest any additional etiology.  PLAN:  Continue to hold all Ca/D suppl  Inc Free water 250cc q 4 (Na stable, net I/o inaccurate)  Lower Ca TF  Trend    Volume:  Appears euvolemic with incont unable to get accurate net, wt stable    CKD 3:  With baseline crea 0.9, 24 hour cr clearance estimates GFR at 45     Hypertension BP stable on single agent lisinopril 40 mg     Anemia:  hgb trending 10-11 and stable, likely Inflammatory primary   Mild management per hospitalist medicine     Acute on chronic hypoxemic respiratory failure  S/p trach  S/p tx ESBL Pseudomonas and Klebsiella pneumonia  Pulmonary following  Currently off antibiotics     Malnutrition dysphagia after CVA, now on tube feed, adjusted for lower calcium      SAH w fall sp craniotomy and hematoma evacuation , PCA aneurysm clip  cb BAYLEE stroke ad vasopasm  Now on rehab, speech and OT/PT therapyies     Insomnia/mood disorders  On Ritalin and mirtazapine  Management per primary medicine     Metabolic encephalopathy stable    SUBJECTIVE:  Pt is non-verbal, working with speech during my visit,  he is new to me, unable to speak and answer questions, discussed with ST and RN.  BERNADETTE huang in tx plan today, monitor Ca.     OBJECTIVE:  Physical Exam   Temp: 98.3  F (36.8  C) Temp src: Axillary BP: 122/78 Pulse: 84   Resp: 22 SpO2: 95 % O2 Device: Trach dome Oxygen Delivery: 20 LPM  Vitals:    08/29/21 0600 08/29/21 0705 08/30/21 0400   Weight: 67.9 kg (149 lb 11.2 oz) 67.8 kg (149 lb 7 oz) 67.9 kg (149 lb 11.2 oz)     Vital Signs with Ranges  Temp:  [97.9  F (36.6  C)-98.6  F (37  C)] 98.3  F (36.8  C)  Pulse:  [80-91] 84  Resp:  [20-24] 22  BP: (120-125)/(76-78) 122/78  FiO2 (%):  [21 %] 21 %  SpO2:  [92 %-98 %] 95 %  I/O last 3 completed shifts:  In: 3993 [NG/GT:3993]  Out: 400 [Urine:400]      Patient Vitals for the past 72 hrs:   Weight   08/30/21 0400 67.9 kg (149 lb 11.2 oz)   08/29/21 0705 67.8 kg (149 lb 7 oz)   08/29/21 0600 67.9 kg (149 lb 11.2 oz)   08/28/21 0550 68.8 kg (151 lb 9.6 oz)       Intake/Output Summary (Last 24 hours) at 8/30/2021 1049  Last data filed at 8/30/2021 0519  Gross per 24 hour   Intake 3693 ml   Output 400 ml   Net 3293 ml       PHYSICAL EXAM:  General - Alert, uncertqain if oriented, he is non-verbal, working with speech, this is my first time meeting his, sitting up in   Cardiovascular - Regular rate and rhythm by doc, VSS   Respiratory -trach with 20LPM   Extremities - No lower extremity edema bilaterally  Neuro:  Exam limited by speech therapy and up in wheelchair, non-verbal, not moving exgtremities for me  MSK:  Grossly intact, but global debit.   Psych:  flat affect    LABORATORY STUDIES:     Recent Labs   Lab 08/30/21  0634   WBC 7.1   RBC 3.30*   HGB 10.5*   HCT 32.0*          Basic Metabolic Panel:  Recent Labs   Lab 08/30/21  0634 08/27/21  0808 08/24/21  0552    139 142   POTASSIUM 4.9 4.6 4.6   CHLORIDE 101 101 103   CO2 31 27 28   BUN 34* 31* 44*   CR 0.90 0.84 0.88    123 105   RAGINI 11.3* 10.6* 10.4       INRNo lab results found in last 7  days.     Recent Labs   Lab Test 08/30/21  0634 08/23/21  0653 05/16/21  0405 05/15/21  2125 05/15/21  1222   INR  --   --   --  1.20* 1.16*   WBC 7.1 8.1   < > 12.3* 16.5*   HGB 10.5* 10.8*   < > 11.8* 13.6    377   < > 239 257    < > = values in this interval not displayed.       Personally reviewed current labs     ~ 25 minutes spent in exam, POC, education regarding renal disease and management.  >90% time spent in education and counseling and/or discussion with patient's care team    Negin Welsh, KALEB-BC  [facility]  [phone]

## 2021-08-30 NOTE — PLAN OF CARE
Problem: Restraint for Non-Violent/Non-Self-Destructive Behavior  Goal: Prevent/Manage Potential Problems  Description: Maintain safety of patient and others during period of restraint.  Promote psychological and physical wellbeing.  Prevent injury to skin and involved body parts.  Promote nutrition, hydration, and elimination.  Outcome: No Change     Problem: Skin and Tissue Injury (Artificial Airway)  Goal: Absence of Device-Related Skin or Tissue Injury  Outcome: Improving   Vital signs were stable. Pt. does not appear to be in pain and was comfortable for the whole shift. Pt. Is still on restraints for safety. TF is running at 90 ml/hr. Continue to monitor.  Margot Sargent RN

## 2021-08-30 NOTE — PROGRESS NOTES
Pulmonary Medicine Follow up Note - Ventilator Rounds:    Clinical status discussed today with RN and respiratory therapist.    Chief complaint: Ongoing respiratory failure  Significant change in clinical status during past 24 hours? No      Ventilation Mode: Trach collar  FiO2 (%): 21 %  Oxygen Concentration (%): 21 %  Resp: 21    Tracheal secretions: x 3 moderate to large    Current phase of ventilator weaning pathway:  Phase 2 with PMV continuously over past few days.     Ventilator weaning results from yesterday: on PMV continuously. FiO2 of 21% @ 20 LPM.    Current Facility-Administered Medications   Medication     acetaminophen (TYLENOL) solution 650 mg    Or     acetaminophen (TYLENOL) tablet 650 mg     acetylcysteine (MUCOMYST) 20 % nebulizer solution 2 mL     alteplase (CATHFLO ACTIVASE) injection 2 mg     armodafinil (NUVIGIL) tablet 150 mg     bisacodyl (DULCOLAX) Suppository 10 mg     dextrose 10% infusion     dextrose 50 % injection 25-50 mL    Or     glucagon injection 1 mg     docusate (COLACE) 50 MG/5ML liquid 100 mg    Or     docusate sodium (COLACE) capsule 100 mg     famotidine (PEPCID) tablet 20 mg     heparin ANTICOAGULANT injection 5,000 Units     hydrALAZINE (APRESOLINE) injection 10 mg     ipratropium - albuterol 0.5 mg/2.5 mg/3 mL (DUONEB) neb solution 3 mL     lisinopril (ZESTRIL) tablet 40 mg     loperamide (IMODIUM) liquid 1 mg     magnesium hydroxide (MILK OF MAGNESIA) suspension 30 mL     menthol-zinc oxide (CALMOSEPTINE) 0.44-20.6 % ointment OINT     mirtazapine (REMERON) tablet TABS 7.5 mg     naloxone (NARCAN) injection 0.2 mg    Or     naloxone (NARCAN) injection 0.4 mg    Or     naloxone (NARCAN) injection 0.2 mg    Or     naloxone (NARCAN) injection 0.4 mg     ondansetron (ZOFRAN) solution 4 mg     polyethylene glycol (MIRALAX) Packet 17 g     scopolamine (TRANSDERM) 72 hr patch 1 patch    And     scopolamine (TRANSDERM-SCOP) Patch in Place     silver nitrate (ARZOL) Misc      "sodium chloride 0.9% infusion     venlafaxine (EFFEXOR) tablet 50 mg     Physical exam     /86 (BP Location: Left arm)   Pulse 91   Temp 98.1  F (36.7  C) (Oral)   Resp 21   Ht 1.803 m (5' 10.98\")   Wt 67.9 kg (149 lb 11.2 oz)   SpO2 93%   BMI 20.89 kg/m      Gen: tracks intermittently  HEENT: AT/NC. Trach in place.   Lungs: diminished ant otherwise clear  CV: regular, no murmurs or gallops appreciated  Abdomen: soft, NT, BS wnl  Ext: no edema  Neuro: awake, tracking intermittently, smiles. Leans to his Right.     Sodium   Date Value Ref Range Status   08/30/2021 142 136 - 145 mmol/L Final   06/11/2021 143 133 - 144 mmol/L Final     Potassium   Date Value Ref Range Status   08/30/2021 4.9 3.5 - 5.0 mmol/L Final   06/11/2021 3.8 3.4 - 5.3 mmol/L Final     Chloride   Date Value Ref Range Status   08/30/2021 101 98 - 107 mmol/L Final   06/11/2021 106 94 - 109 mmol/L Final     Carbon Dioxide   Date Value Ref Range Status   06/11/2021 36 (H) 20 - 32 mmol/L Final     Carbon Dioxide (CO2)   Date Value Ref Range Status   08/30/2021 31 22 - 31 mmol/L Final     Anion Gap   Date Value Ref Range Status   08/30/2021 10 5 - 18 mmol/L Final   06/11/2021 2 (L) 3 - 14 mmol/L Final     Glucose   Date Value Ref Range Status   08/30/2021 113 70 - 125 mg/dL Final   06/11/2021 142 (H) 70 - 99 mg/dL Final     Urea Nitrogen   Date Value Ref Range Status   08/30/2021 34 (H) 8 - 22 mg/dL Final   06/11/2021 25 7 - 30 mg/dL Final     Creatinine   Date Value Ref Range Status   08/30/2021 0.90 0.70 - 1.30 mg/dL Final   06/11/2021 0.57 (L) 0.66 - 1.25 mg/dL Final     GFR Estimate   Date Value Ref Range Status   08/30/2021 87 >60 mL/min/1.73m2 Final     Comment:     As of July 11, 2021, eGFR is calculated by the CKD-EPI creatinine equation, without race adjustment. eGFR can be influenced by muscle mass, exercise, and diet. The reported eGFR is an estimation only and is only applicable if the renal function is stable.   07/09/2021 >60 " >60 mL/min/1.73m2 Final   06/11/2021 >90 >60 mL/min/[1.73_m2] Final     Comment:     Non  GFR Calc  Starting 12/18/2018, serum creatinine based estimated GFR (eGFR) will be   calculated using the Chronic Kidney Disease Epidemiology Collaboration   (CKD-EPI) equation.       Calcium   Date Value Ref Range Status   08/30/2021 11.3 (H) 8.5 - 10.5 mg/dL Final   06/11/2021 9.6 8.5 - 10.1 mg/dL Final        Diagnosis:     Patient is a 69 yo man with history of hypertension. Patient initially presented on 15-May-2021 to [facility] after being found unresponsive by his wife. Patient was then found to have a subarachnoid hemorrhage secondary to a ruptured aneurysm. Patient was intubated and was started on mannitol, IV antihypertensives and hyperventilation and patient was then transferred to [facility]. Patient underwent aneurysm clipping as well as placement of extra-ventricular drain from hydrocephalus on 15-May-2021. Extra-ventricular drain would malfunction and would require replacement on 04-Jun-2021 and 06-Jun-2021. Extra-ventricular drain reportedly was removed on 09-Jun-2021. Patient had intra-arterial vasospasm treatment with verapamil on 28-May-2021.  Patient was extubated on 16-May-2021 but had to be reintubated on 19-May-2021. Patient would be extubated on 03-Jun-2021 but would be reintubated on 06-Jun-2021. Patient had tracheostomy and PEG-tube placed on 07-Jun-2021. Patient required treatment for hospital-acquired pneumonia. Patient was transferred to LTAC on 11-Jun-2021.    1. Acute respiratory failure s/p trach 6/7/2021  2. S/p treatment pseudomonas ESBL and klebsiella pneumonia   3. Encephalopathy   4. Subarachnoid bleed s/p craniotomy and clipping P-comm rupture aneurysm.   5. HTN  6. Malnourishment   7. Dysphagia s/p G tube    Recommendations/Plans (MDM):  1. Weaning orders: on phase 2 with PMV continuously. Will advance to phase 3 capping starting  tomorrow.  2. Trach change? Routine #6 bivona, changed 8/29. Next change in 1 month.  3. Diagnostic testing? no  4. Continue pulmonary toiletting, scheduled ipratropium-albuterol, 3% saline, acetylcysteine, metaneb.     Evon Boone MD  8/30/2021

## 2021-08-31 ENCOUNTER — APPOINTMENT (OUTPATIENT)
Dept: OCCUPATIONAL THERAPY | Facility: CLINIC | Age: 71
DRG: 207 | End: 2021-08-31
Attending: HOSPITALIST
Payer: COMMERCIAL

## 2021-08-31 ENCOUNTER — APPOINTMENT (OUTPATIENT)
Dept: PHYSICAL THERAPY | Facility: CLINIC | Age: 71
DRG: 207 | End: 2021-08-31
Attending: HOSPITALIST
Payer: COMMERCIAL

## 2021-08-31 ENCOUNTER — APPOINTMENT (OUTPATIENT)
Dept: SPEECH THERAPY | Facility: CLINIC | Age: 71
DRG: 207 | End: 2021-08-31
Attending: HOSPITALIST
Payer: COMMERCIAL

## 2021-08-31 PROCEDURE — 99233 SBSQ HOSP IP/OBS HIGH 50: CPT | Performed by: HOSPITALIST

## 2021-08-31 PROCEDURE — 94660 CPAP INITIATION&MGMT: CPT

## 2021-08-31 PROCEDURE — 97530 THERAPEUTIC ACTIVITIES: CPT | Mod: GP | Performed by: PHYSICAL THERAPIST

## 2021-08-31 PROCEDURE — 99232 SBSQ HOSP IP/OBS MODERATE 35: CPT | Performed by: NURSE PRACTITIONER

## 2021-08-31 PROCEDURE — 250N000009 HC RX 250: Performed by: INTERNAL MEDICINE

## 2021-08-31 PROCEDURE — 999N000157 HC STATISTIC RCP TIME EA 10 MIN

## 2021-08-31 PROCEDURE — 999N000009 HC STATISTIC AIRWAY CARE

## 2021-08-31 PROCEDURE — 97112 NEUROMUSCULAR REEDUCATION: CPT | Mod: GP | Performed by: PHYSICAL THERAPIST

## 2021-08-31 PROCEDURE — 120N000001 HC R&B MED SURG/OB

## 2021-08-31 PROCEDURE — 94640 AIRWAY INHALATION TREATMENT: CPT | Mod: 76

## 2021-08-31 PROCEDURE — 97535 SELF CARE MNGMENT TRAINING: CPT | Mod: GO

## 2021-08-31 PROCEDURE — 250N000011 HC RX IP 250 OP 636

## 2021-08-31 PROCEDURE — 92507 TX SP LANG VOICE COMM INDIV: CPT | Mod: GN

## 2021-08-31 PROCEDURE — 999N000123 HC STATISTIC OXYGEN O2DAILY TECH TIME

## 2021-08-31 PROCEDURE — 250N000013 HC RX MED GY IP 250 OP 250 PS 637

## 2021-08-31 PROCEDURE — 250N000013 HC RX MED GY IP 250 OP 250 PS 637: Performed by: NURSE PRACTITIONER

## 2021-08-31 PROCEDURE — 94668 MNPJ CHEST WALL SBSQ: CPT

## 2021-08-31 PROCEDURE — 250N000013 HC RX MED GY IP 250 OP 250 PS 637: Performed by: INTERNAL MEDICINE

## 2021-08-31 PROCEDURE — 94640 AIRWAY INHALATION TREATMENT: CPT

## 2021-08-31 PROCEDURE — 250N000013 HC RX MED GY IP 250 OP 250 PS 637: Performed by: HOSPITALIST

## 2021-08-31 PROCEDURE — 97110 THERAPEUTIC EXERCISES: CPT | Mod: GO

## 2021-08-31 RX ADMIN — ACETYLCYSTEINE 2 ML: 200 SOLUTION ORAL; RESPIRATORY (INHALATION) at 19:25

## 2021-08-31 RX ADMIN — FAMOTIDINE 20 MG: 20 TABLET, FILM COATED ORAL at 08:15

## 2021-08-31 RX ADMIN — HEPARIN SODIUM 5000 UNITS: 5000 INJECTION, SOLUTION INTRAVENOUS; SUBCUTANEOUS at 21:18

## 2021-08-31 RX ADMIN — MIRTAZAPINE 7.5 MG: 7.5 TABLET ORAL at 21:19

## 2021-08-31 RX ADMIN — HEPARIN SODIUM 5000 UNITS: 5000 INJECTION, SOLUTION INTRAVENOUS; SUBCUTANEOUS at 13:52

## 2021-08-31 RX ADMIN — VENLAFAXINE 50 MG: 25 TABLET ORAL at 12:09

## 2021-08-31 RX ADMIN — ANORECTAL OINTMENT 1 G: 15.7; .44; 24; 20.6 OINTMENT TOPICAL at 21:18

## 2021-08-31 RX ADMIN — ACETYLCYSTEINE 2 ML: 200 SOLUTION ORAL; RESPIRATORY (INHALATION) at 07:49

## 2021-08-31 RX ADMIN — ANORECTAL OINTMENT: 15.7; .44; 24; 20.6 OINTMENT TOPICAL at 13:55

## 2021-08-31 RX ADMIN — ARMODAFINIL 150 MG: 150 TABLET ORAL at 05:32

## 2021-08-31 RX ADMIN — HEPARIN SODIUM 5000 UNITS: 5000 INJECTION, SOLUTION INTRAVENOUS; SUBCUTANEOUS at 05:38

## 2021-08-31 RX ADMIN — FAMOTIDINE 20 MG: 20 TABLET, FILM COATED ORAL at 21:19

## 2021-08-31 RX ADMIN — VENLAFAXINE 50 MG: 25 TABLET ORAL at 17:56

## 2021-08-31 RX ADMIN — SCOPALAMINE 1 PATCH: 1 PATCH, EXTENDED RELEASE TRANSDERMAL at 13:52

## 2021-08-31 RX ADMIN — LISINOPRIL 40 MG: 20 TABLET ORAL at 08:15

## 2021-08-31 RX ADMIN — ANORECTAL OINTMENT: 15.7; .44; 24; 20.6 OINTMENT TOPICAL at 08:16

## 2021-08-31 RX ADMIN — VENLAFAXINE 50 MG: 25 TABLET ORAL at 08:15

## 2021-08-31 RX ADMIN — IPRATROPIUM BROMIDE AND ALBUTEROL SULFATE 3 ML: 2.5; .5 SOLUTION RESPIRATORY (INHALATION) at 19:25

## 2021-08-31 RX ADMIN — IPRATROPIUM BROMIDE AND ALBUTEROL SULFATE 3 ML: 2.5; .5 SOLUTION RESPIRATORY (INHALATION) at 07:48

## 2021-08-31 NOTE — PLAN OF CARE
Problem: Device-Related Complication Risk (Artificial Airway)  Goal: Optimal Device Function  Outcome: Improving  Intervention: Optimize Device Care and Function  Recent Flowsheet Documentation  Taken 8/30/2021 2007 by Timoteo Tran RT  Airway Safety Measures:    all equipment/monitors on and audible    manual resuscitator at bedside    suction at bedside    suction equipment    oxygen flowmeter     Problem: Skin and Tissue Injury (Artificial Airway)  Goal: Absence of Device-Related Skin or Tissue Injury  Outcome: Improving     Problem: Communication Impairment (Artificial Airway)  Goal: Effective Communication  Outcome: Improving  RT PROGRESS NOTE     DATA:     CURRENT SETTINGS:             TRACH TYPE / SIZE: #6 Bivona (Placed 08/29)             MODE:  TM with PMV             FIO2:   21%, 20 L     ACTION:             THERAPIES: Duo-neb/Mucomyst/MetaNeb BID             SUCTION: Yes                          FREQUENCY: 2x                        AMOUNT:   Small to Moderate                         CONSISTENCY: Thick                        COLOR:   Pale yellow             SPONTANEOUS COUGH EFFORT/STRENGTH OF EFFORT (not elicited by suctioning): Strong cough.                              WEANING PHASE:   2                        WEAN MODE:  TM with PMV                        WEAN TIME:   Continuously as reji.                        END WEAN REASON:        RESPONSE:             BS:   Diminished              VITAL SIGNS: Sat 94-96%, HR 80-89, RR 17-22             EMOTIONAL NEEDS / CONCERNS:  None                RISK FOR SELF DECANNULATION:  Yes                        RISK DUE TO:  Confusion                        INTERVENTION/S IN PLACE IS/ARE: Mitts x2       NOTE / PLAN:     Pt remains on TM with PMV and tolerating well with no distress.  There is phase 3 order, please try this morning if pt is able to.  Continue current care plan.

## 2021-08-31 NOTE — PROGRESS NOTES
[facility]    Medicine Progress Note - Hospitalist Service       Date of Admission:  6/11/2021    Summary:  Dayron Neri is a 70 year male with h/o HTN who presented to ED on 5/15/2021 with acute SAH after a fall with unresponsiveness.  He underwent emergent left external ventricular drain placement.  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  On 5/28/2021, patient had persistent cerebral vasospasm, CT head showed bilateral BAYLEE infarct and was treated with milrinone and verapamil.  On 6/1/2021, angiogram showed no evidence of vasospasm.  Extubated on 6/3/2021.  6/6/2021 he was reintubated due to hypoxia.  On 6/7/2021, patient underwent tracheostomy and PEG tube placement.  EVD removed 6/9/2021.   Patient was treated for possible pneumonia with Unasyn on 5/20/2021, It was switched to ceftriaxone the next day for sputum culture growing Klebsiella. On 5/23, sputum culture also grew Pseudomonas. Antibiotic was switched to Zosyn.  Due to increased WBCs in CSF, possibility of ventriculitis was raised. CSF cult  +ure was negative. Antibiotic was switched to cefepime and vancomycin on 5/28/2021 for 2 weeks.  Vanco was discontinued on 6/9/2021. On 6/5/2021, sputum grew ESBL so cefepime was switched to meropenem. [facility]     Patient sputum also grew MDR Pseudomonas on 6/19 and was treated with tobramycin nebs from 6/24/2021 to 7/9/2021.  Patient also had an episode of aspiration with his tube feeds, and was treated with ceftazidime-IV Bactrim along with Flagyl for possible aspiration from 7/9/2020 127/18/21.  He has had repeated bouts of Klebsiella infection in sputum and ventilator associated pneumonia.    Since admission to [facility], patient did not make significant progression for his neurology status. Trach weaning is slow due to his cognitive impairment.       Assessment & Plan           Acute on  "chronic hypoxemic respiratory failure:   -S/p treatment of pseudomonas ESBL and Klebsiella pneumonia.  -Has excessive secretions.   -Weaning has been slow due to his cognitive impairment.  - Continue to wean per RT and pulmonary, trach capped today.  - Airway secretion: Glycopyrrolate was tried and stopped. 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 Last CT head done on 7/1/21, which shows resolution of previous multicompartment hemorrhage in the right frontal lobe and the ventricles.   - Need follow up with Dr. Martinez with Neurosurgery in 3 months with repeat CTA of head and neck for post-op f/u. Next CTA of head and neck mid September.     Acute metabolic encephalopathy/cognitive impairment: His current neurology status is likely his new baseline due to frontal lobe injury.  Wife is still hopeful he will improve further but it is unclear if he has the capacity to focus and learn what they are attempting to teach him during therapy.  - Continue Effexor and nuvigil     Dysphagia: On tube feeds and NPO.   - Speech therapy following. Per note from 8/17/21, despite several weeks of working with patient there has been \"no functional improvement in patient's ability to manage his oropharyngeal secretions.\" Speech discussed this with wife. Intensive dysphagia treatment has been discontinued.     Severe malnutrition: has moderate/severe subcutaneous fat loss, moderate/severe muscle loss  - continuous tube feeding per dietician     Chronic kidney disease (CKD) stage 3a: Creatinine has been stable.    Anemia: hemoglobin stable. Continue to monitor.    Essential hypertension: BP controlled. 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 Sputum culture 6/19 showed MDR pseudomonas. On Tobramycin nebs 6/24/2021 - 7/9/2021.   - Continue pulmonary toilet, scheduled duonebs    Ventilator associated pneumonia: Completed antibiotics on July 18.    Severe debility, weakness, critical illness, deconditioning: Continue PT/OT    Hypercalcemia: unclear etiology, mild at this point. Parathyroid hormone normal. May be related to relative immobilization. Continue to monitor and adjust ca in tube feeds per RD recommendation.    Insomnia: Continue nighttime mirtazapine 7.5 mg scheduled.       Diet: Adult Formula Drip Feeding: Continuous [employer] Peptide 1.5; Gastrostomy; Goal Rate: 90; mL/hr; Medication - Feeding Tube Flush Frequency: At least 15-30 mL water before and after medication administration and with tube clogging; Amount to Send ...    DVT Prophylaxis: Heparin SQ    Code Status: Full Code      Disposition Plan   Expected discharge: To be determined  Barriers: Excessive secretions needing frequent suctioning    The patient's care was discussed with the Bedside Nurse and Care Coordinator/.    Kim Knox MD  Hospitalist Service  [facility]  Securely message with the Vocera Web Console (learn more here)  Text page via [employer] Paging/Directory      ______________________________________________________________________    Interval History   Pt seen, examined.  Chart reviewed and events noted.  Working with speech therapy during my visit.   Pt mouthing words and mumbled speech. Denies any chest pain, sob.   Wife Susy on Ipad.       Data reviewed today: I reviewed all medications, new labs and imaging results over the last 24 hours.     Physical Exam   Vital Signs: Temp: 98.1  F (36.7  C) Temp src: Axillary BP: 111/71 Pulse: 80   Resp: 17 SpO2: 94 % O2 Device: Trach dome Oxygen Delivery: 20 LPM  Weight: 149 lbs 11.2 oz    General appearance: Not in acute  distress  HEENT: PERRL, EOMI. Trach in place.  Lungs: Clear breath sounds in bilateral lung fields  Cardiovascular: Regular rate and rhythm, normal S1-S2  Abdomen: Soft, non tender, no distension  Musculoskeletal: No joint swelling  Skin: No rash and no edema  Neurology: Awake and alert. Mumble speech and hard to understand. Moves all four extremities.      Data   Recent Labs   Lab 08/30/21  0634 08/27/21  0808   WBC 7.1  --    HGB 10.5*  --    MCV 97  --      --     139   POTASSIUM 4.9 4.6   CHLORIDE 101 101   CO2 31 27   BUN 34* 31*   CR 0.90 0.84   ANIONGAP 10 11   RAGINI 11.3* 10.6*    123   ALBUMIN 2.9*  --        Restraint:    Patient pulls his IV line and trach.     Within an hour after restraint an in person face to face assessment was completed, including an evaluation of the patient's immediate reaction to the intervention, behavioral assessment and review/assessment of history, drugs and medications, recent labs, etc., and behavioral condition.  The patient experienced: No adverse physical outcome from seclusion/restraint initiation.  The intervention of restraint or seclusion needs to continue.

## 2021-08-31 NOTE — PROGRESS NOTES
"CLINICAL NUTRITION SERVICES - REASSESSMENT NOTE      Recommendations Ordered by Registered Dietitian (RD):   Continue current TF regimen as ordered  FWF per nephrology   Malnutrition:   % Weight Loss:  Weight loss does not meet criteria for malnutrition - weight shifts likely fluid-related  % Intake:  No decreased intake noted  Subcutaneous Fat Loss:  Orbital region severe depletion and Upper arm region severe depletion  Muscle Loss:  Temporal region severe depletion, Clavicle bone region severe depletion, Acromion bone region severe depletion, Dorsal hand region severe depletion, Anterior thigh region severe depletion and Posterior calf region severe depletion  Fluid Retention:  None noted    Malnutrition Diagnosis: Severe malnutrition  In Context of:  Chronic illness or disease     EVALUATION OF PROGRESS TOWARD GOALS   Diet:  None    Nutrition Support:       Adult Formula [employer] Peptide 1.5    Route Gastrostomy    Goal Rate 90    Goal Units mL/hr      Free Water - Feeding Tube Flush Frequency Q 4 Hours    Additional Free Water 250      Intake/Tolerance:  Pt tolerating TF at goal rate    ASSESSED NUTRITION NEEDS:  Dosing Weight:  67.9 kg    NEW FINDINGS:   - will start on Phase 3 capping tomorrow per MD's order  - Calcium initially decreased with new TF formula and diuresis, has become elevated again yesterday at 11.3 mg/dL  - Hypercalcemia due to relative immobility with prolonged hospitalization, and CKD 3/reduced clearance  - His current neurology status is likely his new baseline due to frontal lobe injury.  Wife is still hopeful he will improve further but it is unclear if he has the capacity to focus and learn what they are attempting to teach him during therapy.  - Speech therapy following. Per note from 8/17/21, despite several weeks of working with patient there has been \"no functional improvement in patient's ability to manage his oropharyngeal secretions.\" Speech discussed this with wife. Intensive " dysphagia treatment has been discontinued.   - Confirmed with wife that speech and therapy is following pt. And working with him    08/30/21 0400 67.9 kg (149 lb 11.2 oz)   08/29/21 0705 67.8 kg (149 lb 7 oz)   08/29/21 0600 67.9 kg (149 lb 11.2 oz)   08/28/21 0550 68.8 kg (151 lb 9.6 oz)   08/27/21 0645 72.4 kg (159 lb 9.6 oz)   08/26/21 0558 71.6 kg (157 lb 12.8 oz)   08/25/21 0815 73.9 kg (163 lb)   08/24/21 0538 69.6 kg (153 lb 7 oz)   08/23/21 0700 70.9 kg (156 lb 6.4 oz)   08/22/21 0600 72.3 kg (159 lb 4.8 oz)   08/21/21 0700 68.1 kg (150 lb 1.6 oz)     Labs:  Electrolytes  Potassium (mmol/L)   Date Value   08/30/2021 4.9   08/27/2021 4.6   08/24/2021 4.6   06/11/2021 3.8   06/10/2021 3.7   06/09/2021 3.8     Phosphorus (mg/dL)   Date Value   08/30/2021 4.2   08/13/2021 4.2   08/11/2021 4.3   06/11/2021 2.9   06/10/2021 2.5   06/09/2021 2.5   06/08/2021 2.3 (L)   06/07/2021 2.9    Blood Glucose  Glucose (mg/dL)   Date Value   08/30/2021 113   08/27/2021 123   08/24/2021 105   08/23/2021 123   08/20/2021 120   06/11/2021 142 (H)   06/10/2021 134 (H)   06/09/2021 117 (H)   06/08/2021 125 (H)   06/07/2021 103 (H)     Hemoglobin A1C (%)   Date Value   05/15/2021 5.5    Inflammatory Markers  CRP Inflammation (mg/L)   Date Value   06/01/2021 79.0 (H)   05/30/2021 120.0 (H)   05/28/2021 150.0 (H)     WBC (10e9/L)   Date Value   06/11/2021 8.2   06/10/2021 9.4   06/09/2021 10.4     WBC Count (10e3/uL)   Date Value   08/30/2021 7.1   08/23/2021 8.1   08/16/2021 6.7     Albumin (g/dL)   Date Value   08/30/2021 2.9 (L)   08/13/2021 2.9 (L)   08/11/2021 2.9 (L)   06/02/2021 1.7 (L)   05/26/2021 2.1 (L)   05/23/2021 1.9 (L)      Magnesium (mg/dL)   Date Value   08/30/2021 2.1   08/23/2021 2.4   08/16/2021 2.0   06/11/2021 2.5 (H)   06/10/2021 2.4 (H)   06/09/2021 2.4 (H)     Sodium (mmol/L)   Date Value   08/30/2021 142   08/27/2021 139   08/24/2021 142   06/11/2021 143   06/10/2021 142   06/09/2021 143    Renal  Urea  Nitrogen (mg/dL)   Date Value   08/30/2021 34 (H)   08/27/2021 31 (H)   08/24/2021 44 (H)   06/11/2021 25   06/10/2021 23   06/09/2021 24     Creatinine (mg/dL)   Date Value   08/30/2021 0.90   08/27/2021 0.84   08/24/2021 0.88   06/11/2021 0.57 (L)   06/10/2021 0.52 (L)   06/09/2021 0.49 (L)     Additional  Triglycerides (mg/dL)   Date Value   05/28/2021 91   06/05/2013 178 (H)   09/19/2007 109     Ketones Urine   Date Value   07/01/2021 Negative   06/01/2021 Negative mg/dL        Previous Goals:   Support calcium levels by continuing to use lower Ca-containing TF formula ([place] Peptide)  Evaluation: Met    Previous Nutrition Diagnosis:   Impaired nutrient utilization related to possible CKD 3 as evidenced by hypercalcemia  Evaluation: No change    CURRENT NUTRITION DIAGNOSIS  Impaired nutrient utilization related to CKD 3, prolonged hospitalization with relative immobility as evidenced by hypercalcemia 11.3 mg/dL    INTERVENTIONS  Recommendations / Nutrition Prescription  Continue current TF regimen as ordered  FWF per nephrology    Implementation  EN Composition, EN Schedule and Feeding Tube Flush    Goals  Maintain current weight 67.9 kg  Support Ca levels with lower Ca-containing TF formula ([employer] Peptide 1.5)    MONITORING AND EVALUATION:  Progress towards goals will be monitored and evaluated per protocol and Practice Guidelines    Dulce Gonzales RDN, LD  Clinical Dietitian

## 2021-08-31 NOTE — PLAN OF CARE
Problem: Device-Related Complication Risk (Artificial Airway)  Goal: Optimal Device Function  Outcome: Improving     Problem: Skin and Tissue Injury (Artificial Airway)  Goal: Absence of Device-Related Skin or Tissue Injury  Outcome: Improving     Problem: Communication Impairment (Artificial Airway)  Goal: Effective Communication  Outcome: Improving     RT PROGRESS NOTE     DATA:     CURRENT SETTINGS:             TRACH TYPE / SIZE:  #6 [place] changed on 8/29/21             MODE:   Cap             FIO2:   21%     ACTION:             THERAPIES:   Duoneb x1, mucomyst x1, metaneb x1             SUCTION:                           FREQUENCY:   x3                        AMOUNT:   large to sm                        CONSISTENCY:   thick                        COLOR:   white             SPONTANEOUS COUGH EFFORT/STRENGTH OF EFFORT (not elicited by suctioning): strong                              WEANING PHASE:  3                        WEAN MODE:    trach capped                        WEAN TIME:   started at  08:10 am                        END WEAN REASON:        RESPONSE:             BS:   diminished and clear             VITAL SIGNS:   SPO2 91-94%, HR 92, RR 20-22             EMOTIONAL NEEDS / CONCERNS:  confuse                RISK FOR SELF DECANNULATION:  No                        RISK DUE TO:  No                        INTERVENTION/S IN PLACE IS/ARE:         NOTE / PLAN:     Pt to cont with trach capped as reji. First night with trach capped.

## 2021-08-31 NOTE — PLAN OF CARE
Problem: Restraint for Non-Violent/Non-Self-Destructive Behavior  Goal: Prevent/Manage Potential Problems  Description: Maintain safety of patient and others during period of restraint.  Promote psychological and physical wellbeing.  Prevent injury to skin and involved body parts.  Promote nutrition, hydration, and elimination.  Outcome: No Change     Problem: Device-Related Complication Risk (Artificial Airway)  Goal: Optimal Device Function  Intervention: Optimize Device Care and Function  Recent Flowsheet Documentation  Taken 8/30/2021 1631 by Lakshmi Esqueda RN  Airway Safety Measures:   all equipment/monitors on and audible   suction at bedside   suction regulator   suction equipment   oxygen flowmeter     Problem: Communication Impairment (Artificial Airway)  Goal: Effective Communication  Outcome: No Change     Problem: Device-Related Complication Risk (Artificial Airway)  Goal: Optimal Device Function  Intervention: Optimize Device Care and Function  Recent Flowsheet Documentation  Taken 8/30/2021 1631 by Lakshmi Esqueda RN  Airway Safety Measures:   all equipment/monitors on and audible   suction at bedside   suction regulator   suction equipment   oxygen flowmeter     Patient was on Trache mask continuously this evening with PMV and tolerating that well.  He will start on Phase 3 capping tomorrow per MD's order. He was suctioned several times this shift of pale yellow to creamy tracheal secretions in moderate to small amounts. He is  still on bilateral mitt restraints because of his impulsivity, pulling on his lines / drains and unredirectable at times. Will keep monitoring patient's progress and safety.

## 2021-08-31 NOTE — PLAN OF CARE
Plan of Care by Jacqueline Vieyra RN at 2021  9:23 AM     Author: Jacqueline Vieyra RN Service: -- Author Type: RN, Care Manager    Filed: 2021  9:33 AM Date of Service: 2021  9:23 AM Status: Signed    : Jacqueline Vieyra RN (RN, Care Manager)       Care Management Progression of Care Update        DR GLOS - Target D/C Date TBD        PLAN/GOALS  1. Pulmonary following. Phase 2 wean~continous heated trach mask with hi rosemarie 21% Fi02 tolerated PMV days.  Mucomyst, Duo-neb, mucomyst and metaNeb two times a day for pulmonary hygiene.  Frequent suctioning 2-4x/shift.    2.  Nutrition management.  Tube feeding via PEG placed 21.  Registered dietician to monitor tube feeding tolerance, weight and labs.    3.  Neurology following intermittently.    4.  Nephrology following for stage 3 chronic kidney disease.    4. PT/OT 5x/week.     5.  Speech therapy 5x/week. Continue effortful swallow exercises and voicing.    6.  Palliative following.           BARRIERS  1.  Acute hypoxic respiratory failure due to subarachnoid hemorrhage. Trach and oxygen wean.    2.  Traumatic brain injury/intracranial hemorrhage/acute ischemic stroke.    3.  Oropharyngeal dysphagia.    4.   Bilateral mitts for pulling at tubes and lines.    5.  Frequent suctioning of copious secretions.    6.  Stage 3 Chronic Kidney Disease.        Disposition:  TCU  Care Manager Name:  Jacqueline Vieyra RN,BSN, HNB-BC    Date/Time:  21 @ 11:32 AM

## 2021-08-31 NOTE — PROGRESS NOTES
[facility] Palliative Care Social Work Note    Call made to wife Susy to offer emotional support. She visited with Matt on Sunday and he was very tired. She knows he has a lot going on and wonders if he will ever get better. She sees some improvement, but was hoping for more by this point in his recovery. She expressed concerns re recent events with his care. She continues to watch him on the IPAD. Encouraged her to take care of herself. No other needs at this time. Encouraged Susy to call if she or Yolanda need additional support. Provided active listening, processing of emotions, validation of feelings and emotional support.     Josephine Dailey, [facility]  Palliative Care   Office # [phone]

## 2021-08-31 NOTE — PLAN OF CARE
Problem: Adult Inpatient Plan of Care  Goal: Absence of Hospital-Acquired Illness or Injury  Outcome: Improving  Intervention: Identify and Manage Fall Risk  Recent Flowsheet Documentation  Taken 8/31/2021 0043 by Елена Rai RN  Safety Promotion/Fall Prevention:   bed alarm on   room door open   room near nurse's station   safety round/check completed  Intervention: Prevent and Manage VTE (Venous Thromboembolism) Risk  Recent Flowsheet Documentation  Taken 8/31/2021 0043 by Елена Rai RN  VTE Prevention/Management: pneumatic compression device  Intervention: Prevent Infection  Recent Flowsheet Documentation  Taken 8/31/2021 0043 by Елена Rai RN  Infection Prevention:   cohorting utilized   hand hygiene promoted   rest/sleep promoted   personal protective equipment utilized  Goal: Optimal Comfort and Wellbeing  Outcome: Improving     Problem: Anxiety  Goal: Anxiety Reduction or Resolution  Outcome: Improving     Problem: Restraint for Non-Violent/Non-Self-Destructive Behavior  Goal: Prevent/Manage Potential Problems  Description: Maintain safety of patient and others during period of restraint.  Promote psychological and physical wellbeing.  Prevent injury to skin and involved body parts.  Promote nutrition, hydration, and elimination.  Outcome: Improving   Patient continues on mitts x 2 due to pulling of tubes.  Patient responds at staff at times; denied pain; slept about 50% of the shift; tolerated cares/repositioning

## 2021-09-01 ENCOUNTER — APPOINTMENT (OUTPATIENT)
Dept: OCCUPATIONAL THERAPY | Facility: CLINIC | Age: 71
DRG: 207 | End: 2021-09-01
Attending: HOSPITALIST
Payer: COMMERCIAL

## 2021-09-01 ENCOUNTER — APPOINTMENT (OUTPATIENT)
Dept: SPEECH THERAPY | Facility: CLINIC | Age: 71
DRG: 207 | End: 2021-09-01
Attending: HOSPITALIST
Payer: COMMERCIAL

## 2021-09-01 ENCOUNTER — APPOINTMENT (OUTPATIENT)
Dept: PHYSICAL THERAPY | Facility: CLINIC | Age: 71
DRG: 207 | End: 2021-09-01
Attending: HOSPITALIST
Payer: COMMERCIAL

## 2021-09-01 PROCEDURE — 94660 CPAP INITIATION&MGMT: CPT

## 2021-09-01 PROCEDURE — 250N000009 HC RX 250: Performed by: INTERNAL MEDICINE

## 2021-09-01 PROCEDURE — 92507 TX SP LANG VOICE COMM INDIV: CPT | Mod: GN | Performed by: SPEECH-LANGUAGE PATHOLOGIST

## 2021-09-01 PROCEDURE — 250N000011 HC RX IP 250 OP 636

## 2021-09-01 PROCEDURE — 97112 NEUROMUSCULAR REEDUCATION: CPT | Mod: GP | Performed by: PHYSICAL THERAPIST

## 2021-09-01 PROCEDURE — 250N000013 HC RX MED GY IP 250 OP 250 PS 637

## 2021-09-01 PROCEDURE — 250N000013 HC RX MED GY IP 250 OP 250 PS 637: Performed by: HOSPITALIST

## 2021-09-01 PROCEDURE — 97530 THERAPEUTIC ACTIVITIES: CPT | Mod: GO | Performed by: OCCUPATIONAL THERAPIST

## 2021-09-01 PROCEDURE — 97530 THERAPEUTIC ACTIVITIES: CPT | Mod: GP | Performed by: PHYSICAL THERAPIST

## 2021-09-01 PROCEDURE — 250N000013 HC RX MED GY IP 250 OP 250 PS 637: Performed by: INTERNAL MEDICINE

## 2021-09-01 PROCEDURE — 94668 MNPJ CHEST WALL SBSQ: CPT

## 2021-09-01 PROCEDURE — 999N000009 HC STATISTIC AIRWAY CARE

## 2021-09-01 PROCEDURE — 99232 SBSQ HOSP IP/OBS MODERATE 35: CPT | Performed by: NURSE PRACTITIONER

## 2021-09-01 PROCEDURE — 94640 AIRWAY INHALATION TREATMENT: CPT

## 2021-09-01 PROCEDURE — 999N000157 HC STATISTIC RCP TIME EA 10 MIN

## 2021-09-01 PROCEDURE — 94640 AIRWAY INHALATION TREATMENT: CPT | Mod: 76

## 2021-09-01 PROCEDURE — 120N000001 HC R&B MED SURG/OB

## 2021-09-01 PROCEDURE — 250N000013 HC RX MED GY IP 250 OP 250 PS 637: Performed by: NURSE PRACTITIONER

## 2021-09-01 PROCEDURE — 999N000123 HC STATISTIC OXYGEN O2DAILY TECH TIME

## 2021-09-01 RX ADMIN — HEPARIN SODIUM 5000 UNITS: 5000 INJECTION, SOLUTION INTRAVENOUS; SUBCUTANEOUS at 13:35

## 2021-09-01 RX ADMIN — MIRTAZAPINE 7.5 MG: 7.5 TABLET ORAL at 21:10

## 2021-09-01 RX ADMIN — IPRATROPIUM BROMIDE AND ALBUTEROL SULFATE 3 ML: 2.5; .5 SOLUTION RESPIRATORY (INHALATION) at 20:30

## 2021-09-01 RX ADMIN — FAMOTIDINE 20 MG: 20 TABLET, FILM COATED ORAL at 21:09

## 2021-09-01 RX ADMIN — ARMODAFINIL 150 MG: 150 TABLET ORAL at 05:16

## 2021-09-01 RX ADMIN — ANORECTAL OINTMENT: 15.7; .44; 24; 20.6 OINTMENT TOPICAL at 21:10

## 2021-09-01 RX ADMIN — VENLAFAXINE 50 MG: 25 TABLET ORAL at 12:46

## 2021-09-01 RX ADMIN — LISINOPRIL 40 MG: 20 TABLET ORAL at 08:03

## 2021-09-01 RX ADMIN — VENLAFAXINE 50 MG: 25 TABLET ORAL at 08:03

## 2021-09-01 RX ADMIN — ANORECTAL OINTMENT: 15.7; .44; 24; 20.6 OINTMENT TOPICAL at 08:04

## 2021-09-01 RX ADMIN — ACETYLCYSTEINE 2 ML: 200 SOLUTION ORAL; RESPIRATORY (INHALATION) at 07:33

## 2021-09-01 RX ADMIN — VENLAFAXINE 50 MG: 25 TABLET ORAL at 17:17

## 2021-09-01 RX ADMIN — IPRATROPIUM BROMIDE AND ALBUTEROL SULFATE 3 ML: 2.5; .5 SOLUTION RESPIRATORY (INHALATION) at 07:33

## 2021-09-01 RX ADMIN — FAMOTIDINE 20 MG: 20 TABLET, FILM COATED ORAL at 08:04

## 2021-09-01 RX ADMIN — ANORECTAL OINTMENT: 15.7; .44; 24; 20.6 OINTMENT TOPICAL at 13:35

## 2021-09-01 RX ADMIN — HEPARIN SODIUM 5000 UNITS: 5000 INJECTION, SOLUTION INTRAVENOUS; SUBCUTANEOUS at 05:21

## 2021-09-01 RX ADMIN — HEPARIN SODIUM 5000 UNITS: 5000 INJECTION, SOLUTION INTRAVENOUS; SUBCUTANEOUS at 21:10

## 2021-09-01 RX ADMIN — ACETYLCYSTEINE 2 ML: 200 SOLUTION ORAL; RESPIRATORY (INHALATION) at 20:31

## 2021-09-01 ASSESSMENT — MIFFLIN-ST. JEOR: SCORE: 1470.45

## 2021-09-01 NOTE — PROGRESS NOTES
Pulmonary Medicine Follow up Note - Ventilator Rounds:    Clinical status discussed today with RN and respiratory therapist.    Chief complaint: Ongoing respiratory failure  Significant change in clinical status during past 24 hours? No      Ventilation Mode: Other (see comments) (Cap RA)  FiO2 (%): 21 %  Oxygen Concentration (%): 21 %  Resp: 24    Tracheal secretions: x 3 moderate to large    Current phase of ventilator weaning pathway:  Phase 3 since 8/31  Has copious amount of secretions    Current Facility-Administered Medications   Medication     acetaminophen (TYLENOL) solution 650 mg    Or     acetaminophen (TYLENOL) tablet 650 mg     acetylcysteine (MUCOMYST) 20 % nebulizer solution 2 mL     alteplase (CATHFLO ACTIVASE) injection 2 mg     armodafinil (NUVIGIL) tablet 150 mg     bisacodyl (DULCOLAX) Suppository 10 mg     dextrose 10% infusion     dextrose 50 % injection 25-50 mL    Or     glucagon injection 1 mg     docusate (COLACE) 50 MG/5ML liquid 100 mg    Or     docusate sodium (COLACE) capsule 100 mg     famotidine (PEPCID) tablet 20 mg     heparin ANTICOAGULANT injection 5,000 Units     hydrALAZINE (APRESOLINE) injection 10 mg     ipratropium - albuterol 0.5 mg/2.5 mg/3 mL (DUONEB) neb solution 3 mL     lisinopril (ZESTRIL) tablet 40 mg     loperamide (IMODIUM) liquid 1 mg     magnesium hydroxide (MILK OF MAGNESIA) suspension 30 mL     menthol-zinc oxide (CALMOSEPTINE) 0.44-20.6 % ointment OINT     mirtazapine (REMERON) tablet TABS 7.5 mg     naloxone (NARCAN) injection 0.2 mg    Or     naloxone (NARCAN) injection 0.4 mg    Or     naloxone (NARCAN) injection 0.2 mg    Or     naloxone (NARCAN) injection 0.4 mg     ondansetron (ZOFRAN) solution 4 mg     polyethylene glycol (MIRALAX) Packet 17 g     scopolamine (TRANSDERM) 72 hr patch 1 patch    And     scopolamine (TRANSDERM-SCOP) Patch in Place     silver nitrate (ARZOL) Misc     sodium chloride 0.9% infusion     venlafaxine (EFFEXOR) tablet 50 mg  "    Physical exam     /81   Pulse 85   Temp 97.7  F (36.5  C) (Oral)   Resp 24   Ht 1.803 m (5' 10.98\")   Wt 67.9 kg (149 lb 9.6 oz)   SpO2 96%   BMI 20.87 kg/m      Gen: tracks intermittently,  up in chair  HEENT: AT/NC. Trach in place. capped  Lungs: diminished ant otherwise clear  CV: regular, no murmurs or gallops appreciated  Abdomen: soft, NT, BS wnl  Ext: no edema  Neuro: awake, tracking intermittently, smiles. Leans to his Right.     Sodium   Date Value Ref Range Status   08/30/2021 142 136 - 145 mmol/L Final   06/11/2021 143 133 - 144 mmol/L Final     Potassium   Date Value Ref Range Status   08/30/2021 4.9 3.5 - 5.0 mmol/L Final   06/11/2021 3.8 3.4 - 5.3 mmol/L Final     Chloride   Date Value Ref Range Status   08/30/2021 101 98 - 107 mmol/L Final   06/11/2021 106 94 - 109 mmol/L Final     Carbon Dioxide   Date Value Ref Range Status   06/11/2021 36 (H) 20 - 32 mmol/L Final     Carbon Dioxide (CO2)   Date Value Ref Range Status   08/30/2021 31 22 - 31 mmol/L Final     Anion Gap   Date Value Ref Range Status   08/30/2021 10 5 - 18 mmol/L Final   06/11/2021 2 (L) 3 - 14 mmol/L Final     Glucose   Date Value Ref Range Status   08/30/2021 113 70 - 125 mg/dL Final   06/11/2021 142 (H) 70 - 99 mg/dL Final     Urea Nitrogen   Date Value Ref Range Status   08/30/2021 34 (H) 8 - 22 mg/dL Final   06/11/2021 25 7 - 30 mg/dL Final     Creatinine   Date Value Ref Range Status   08/30/2021 0.90 0.70 - 1.30 mg/dL Final   06/11/2021 0.57 (L) 0.66 - 1.25 mg/dL Final     GFR Estimate   Date Value Ref Range Status   08/30/2021 87 >60 mL/min/1.73m2 Final     Comment:     As of July 11, 2021, eGFR is calculated by the CKD-EPI creatinine equation, without race adjustment. eGFR can be influenced by muscle mass, exercise, and diet. The reported eGFR is an estimation only and is only applicable if the renal function is stable.   07/09/2021 >60 >60 mL/min/1.73m2 Final   06/11/2021 >90 >60 mL/min/[1.73_m2] Final     " Comment:     Non  GFR Calc  Starting 12/18/2018, serum creatinine based estimated GFR (eGFR) will be   calculated using the Chronic Kidney Disease Epidemiology Collaboration   (CKD-EPI) equation.       Calcium   Date Value Ref Range Status   08/30/2021 11.3 (H) 8.5 - 10.5 mg/dL Final   06/11/2021 9.6 8.5 - 10.1 mg/dL Final        Diagnosis:     Patient is a 69 yo man with history of hypertension. Patient initially presented on 15-May-2021 to [facility] after being found unresponsive by his wife. Patient was then found to have a subarachnoid hemorrhage secondary to a ruptured aneurysm. Patient was intubated and was started on mannitol, IV antihypertensives and hyperventilation and patient was then transferred to [facility]. Patient underwent aneurysm clipping as well as placement of extra-ventricular drain from hydrocephalus on 15-May-2021. Extra-ventricular drain would malfunction and would require replacement on 04-Jun-2021 and 06-Jun-2021. Extra-ventricular drain reportedly was removed on 09-Jun-2021. Patient had intra-arterial vasospasm treatment with verapamil on 28-May-2021.  Patient was extubated on 16-May-2021 but had to be reintubated on 19-May-2021. Patient would be extubated on 03-Jun-2021 but would be reintubated on 06-Jun-2021. Patient had tracheostomy and PEG-tube placed on 07-Jun-2021. Patient required treatment for hospital-acquired pneumonia. Patient was transferred to LT[facility] on 11-Jun-2021.    1. Acute respiratory failure s/p trach 6/7/2021  2. S/p treatment pseudomonas ESBL and klebsiella pneumonia   3. Encephalopathy   4. Subarachnoid bleed s/p craniotomy and clipping P-comm rupture aneurysm.   5. HTN  6. Malnourishment   7. Dysphagia s/p G tube    Recommendations/Plans (MDM):  1. Weaning orders: phase 3  Will not decannulate yet, copious amount of secretions per Rt Jennifer   2. Trach change? 8/29  3. Diagnostic testing? no  4. Continue pulmonary  toiletting, scheduled ipratropium-albuterol, 3% saline, acetylcysteine, metaneb.     Charity Beasley APRN CNP    [facility]m/[facility]

## 2021-09-01 NOTE — PLAN OF CARE
Patient was up in the chair earlier.  Spouse was concerned that she can't see that we are checking on him.  The charge nurse had a conversation with her and let her know that we are continually monitoring him - he is visible from the desk - but it is unreasonable to expect that we will make ourselves known to her every 15 minutes.  She explained to her that she is welcome to call us anytime if she has concerns from watching the Ipad.  We will continue to monitor.

## 2021-09-01 NOTE — PLAN OF CARE
Problem: Device-Related Complication Risk (Artificial Airway)  Goal: Optimal Device Function  Outcome: No Change  Intervention: Optimize Device Care and Function  Recent Flowsheet Documentation  Taken 9/1/2021 0733 by Jennifer Mon, RT  Airway Safety Measures: all equipment/monitors on and audible     Problem: Skin and Tissue Injury (Artificial Airway)  Goal: Absence of Device-Related Skin or Tissue Injury  Outcome: No Change     Problem: Communication Impairment (Artificial Airway)  Goal: Effective Communication  Outcome: No Change     Problem: Device-Related Complication Risk (Mechanical Ventilation, Invasive)  Goal: Optimal Device Function  Intervention: Optimize Device Care and Function  Recent Flowsheet Documentation  Taken 9/1/2021 0733 by Jennifer Mon, RT  Airway Safety Measures: all equipment/monitors on and audible

## 2021-09-01 NOTE — PROGRESS NOTES
RENAL PROGRESS NOTE    HPI: 70 year old male with past medical history of hypertension, admitted with subarachnoid hemorrhage after a fall on 5/15/2021 s/p emergent left external ventricular drain placement with posterior communicating artery aneurysm defied structured underwent craniotomy with clipping of aneurysm , complicated by persistent cerebral vasospasms and bilateral BAYLEE infarct.  Course also complicated by respiratory failure with intubation pneumonia was treated with cefepime and Vanco, transferred to LTAC on 6/11/2021.  Nephrology consulted for hypercalcemia on 8/15/2021     Hypercalcemia: moderate, d/t relative immobility with prolonged hospitalization, and CKD/reduced clearance  Corrected Ca 12's   PTH 26 on 8/11/2021, vitamin D level 30 8/15/2021  No history suggestive of any lymphoma or lymphadenopathy or any granulomatosis related disorders to suggest any additional etiology.  PLAN:  Continue to hold all Ca/D suppl  Inc Free water 250cc q 4 (Na stable, net I/o inaccurate)  Lower Ca TF  Trend    Volume:  Appears euvolemic with incont unable to get accurate net, wt stable    CKD 3:  With baseline crea 0.9, 24 hour cr clearance estimates GFR at 45     Hypertension BP stable on single agent lisinopril 40 mg     Anemia:  hgb trending 10-11 and stable, likely Inflammatory primary   Mild management per hospitalist medicine     Acute on chronic hypoxemic respiratory failure  S/p trach  S/p tx ESBL Pseudomonas and Klebsiella pneumonia  Pulmonary following  Currently off antibiotics     Malnutrition dysphagia after CVA, now on tube feed, adjusted for lower calcium      SAH w fall sp craniotomy and hematoma evacuation , PCA aneurysm clip  cb BAYLEE stroke ad vasopasm  Now on rehab, speech and OT/PT therapyies     Insomnia/mood disorders  On Ritalin and mirtazapine  Management per primary medicine     Metabolic encephalopathy stable    SUBJECTIVE:  Pt is non-verbal, Wife on ipad bedside but taking an  "outside phone call during my visit which limited intake.  Discussed with bedside nurse. States pt stable, but moved to bed closer to RN station per wifes request that he \"wasn't checked on enough\". No other issues reported by staff.     OBJECTIVE:  Physical Exam   Temp: 97.7  F (36.5  C) Temp src: Oral BP: 133/81 Pulse: 85   Resp: 24 SpO2: 96 % O2 Device: None (Room air)    Vitals:    08/29/21 0705 08/30/21 0400 09/01/21 0600   Weight: 67.8 kg (149 lb 7 oz) 67.9 kg (149 lb 11.2 oz) 67.9 kg (149 lb 9.6 oz)     Vital Signs with Ranges  Temp:  [97.7  F (36.5  C)-98.3  F (36.8  C)] 97.7  F (36.5  C)  Pulse:  [82-86] 85  Resp:  [16-24] 24  BP: (114-138)/(77-82) 133/81  FiO2 (%):  [21 %] 21 %  SpO2:  [93 %-96 %] 96 %  I/O last 3 completed shifts:  In: 3028 [NG/GT:3028]  Out: 2700 [Urine:2700]      Patient Vitals for the past 72 hrs:   Weight   09/01/21 0600 67.9 kg (149 lb 9.6 oz)   08/30/21 0400 67.9 kg (149 lb 11.2 oz)       Intake/Output Summary (Last 24 hours) at 8/30/2021 1049  Last data filed at 8/30/2021 0519  Gross per 24 hour   Intake 3693 ml   Output 400 ml   Net 3293 ml       PHYSICAL EXAM:  General - Alert, uncertqain if oriented, he is non-verbal, sitting up in WC bedside, wife present briefly bedside via ipad.   Cardiovascular - Regular rate and rhythm by doc, VSS   Respiratory -trach capped  Extremities - No lower extremity edema bilaterally  Neuro:  Exam limited by speech therapy and up in wheelchair, non-verbal, not moving exgtremities for me  MSK:  Grossly intact, but global debit.   Psych:  flat affect    LABORATORY STUDIES:     Recent Labs   Lab 08/30/21  0634   WBC 7.1   RBC 3.30*   HGB 10.5*   HCT 32.0*          Basic Metabolic Panel:  Recent Labs   Lab 08/30/21  0634 08/27/21  0808    139   POTASSIUM 4.9 4.6   CHLORIDE 101 101   CO2 31 27   BUN 34* 31*   CR 0.90 0.84    123   RAGINI 11.3* 10.6*       INRNo lab results found in last 7 days.     Recent Labs   Lab Test 08/30/21  0634 " 08/23/21  0653 05/16/21  0405 05/15/21  2125 05/15/21  1222   INR  --   --   --  1.20* 1.16*   WBC 7.1 8.1   < > 12.3* 16.5*   HGB 10.5* 10.8*   < > 11.8* 13.6    377   < > 239 257    < > = values in this interval not displayed.       Personally reviewed current labs     ~ 25 minutes spent in exam, POC, education regarding renal disease and management.  >90% time spent in education and counseling and/or discussion with patient's care team    Negin Welsh, [facility]-BC  [facility]  [phone]

## 2021-09-01 NOTE — PLAN OF CARE
Problem: Device-Related Complication Risk (Artificial Airway)  Goal: Optimal Device Function  Outcome: Improving     Problem: Skin and Tissue Injury (Artificial Airway)  Goal: Absence of Device-Related Skin or Tissue Injury  Outcome: Improving     Problem: Communication Impairment (Artificial Airway)  Goal: Effective Communication  Outcome: Improving     RT PROGRESS NOTE     DATA:     CURRENT SETTINGS:             TRACH TYPE / SIZE: #6 Bivona placed on 08/29/21             MODE:   Trach capped             FIO2:   RA     ACTION:             THERAPIES:   MUCOMYST BID/DUO NEB BID/MET BID             SUCTION:                           FREQUENCY:   X3                        AMOUNT:   Copious to moderate                        CONSISTENCY:   Thick                        COLOR:   White/yellow             SPONTANEOUS COUGH EFFORT/STRENGTH OF EFFORT (not elicited by suctioning): Yes:Strong                              WEANING PHASE:   x3                        WEAN MODE:    Trach capped                        WEAN TIME:   08:10 am                        END WEAN REASON:   Cont     RESPONSE:             BS:   Coarse             VITAL SIGNS:   SAT 93-95%, HR 84-86, RR 20-24             EMOTIONAL NEEDS / CONCERNS: N/A               RISK FOR SELF DECANNULATION:  YES                        RISK DUE TO:  Restless                        INTERVENTION/S IN PLACE IS/ARE:  Bilateral mitts     NOTE / PLAN:   Pt is on 1st night on trach capped, reji well. Cont capping as reji and keep sat >/=90%

## 2021-09-01 NOTE — PROGRESS NOTES
Palliative update:    Chart reviewed.  Now on phase 3 wean, still requires b/l hand mitts, continues to have large amount of tracheal secretions.  Discussed with Dr. Knox, TRISTAN Beasley from Pulmonary, and Palliative SW.      - Goals established.  Wife wishes to push forward with hopes of future decannulation.  To remain full code.      Our service will sign off.  Please call with any questions, concerns, or clinical status change requiring further assistance with family.    Maximus Mathis CNP  Palliative Medicine  [facility]  Pager [phone]

## 2021-09-01 NOTE — PROGRESS NOTES
[facility]    Medicine Progress Note - Hospitalist Service       Date of Admission:  6/11/2021    Summary:  Dayron Neri is a 70 year male with h/o HTN who presented to ED on 5/15/2021 with acute SAH after a fall with unresponsiveness.  He underwent emergent left external ventricular drain placement.  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  On 5/28/2021, patient had persistent cerebral vasospasm, CT head showed bilateral BAYLEE infarct and was treated with milrinone and verapamil.  On 6/1/2021, angiogram showed no evidence of vasospasm.  Extubated on 6/3/2021.  6/6/2021 he was reintubated due to hypoxia.  On 6/7/2021, patient underwent tracheostomy and PEG tube placement.  EVD removed 6/9/2021.   Patient was treated for possible pneumonia with Unasyn on 5/20/2021, It was switched to ceftriaxone the next day for sputum culture growing Klebsiella. On 5/23, sputum culture also grew Pseudomonas. Antibiotic was switched to Zosyn.  Due to increased WBCs in CSF, possibility of ventriculitis was raised. CSF cult  +ure was negative. Antibiotic was switched to cefepime and vancomycin on 5/28/2021 for 2 weeks.  Vanco was discontinued on 6/9/2021. On 6/5/2021, sputum grew ESBL so cefepime was switched to meropenem. He was transferred to [facility] on 6/11/21.      Patient sputum also grew MDR Pseudomonas on 6/19 and was treated with tobramycin nebs from 6/24/2021 to 7/9/2021.  Patient also had an episode of aspiration with his tube feeds, and was treated with ceftazidime-IV Bactrim along with Flagyl for possible aspiration from 7/9/2020 127/18/21.  He has had repeated bouts of Klebsiella infection in sputum and ventilator associated pneumonia.     Since admission to [facility], patient did not make significant progression for his neurology status. Trach weaning is slow due to his cognitive impairment.      Assessment & Plan         Acute on chronic hypoxemic  "respiratory failure:   -S/p treatment of pseudomonas ESBL and Klebsiella pneumonia.  -Has excessive secretions.   -Weaning has been slow due to his cognitive impairment.  - Continue to wean per RT and pulmonary, trach capped today.  - Airway secretion: Glycopyrrolate was tried and stopped. Currently on Scopolamine (started 7/20/2021)  - Continue  Duoneb, acetylcysteine, and 3% saline nebs      Traumatic brain injury/intracranial hemorrhage/acute ischemic stroke: had ruptured posterior communicating aneurysm s/p craniotomy with clipping of PCOM aneurysm and bilateral BAYLEE infarct in May 2021. Last CT head done on 7/1/21, which shows resolution of previous multicompartment hemorrhage in the right frontal lobe and the ventricles.   - Recommendation from Dr. Martinez with Neurosurgery for repeat CTA of head and and neck in 3 months, which will be mid September.      Acute metabolic encephalopathy/cognitive impairment: His current neurology status is likely his new baseline due to frontal lobe injury.  Wife is still hopeful he will improve further but it is unclear if he has the capacity to focus and learn what they are attempting to teach him during therapy.  - Continue Effexor and nuvigil      Dysphagia: On tube feeds and NPO.   - Speech therapy following. Per note from 8/17/21, despite several weeks of working with patient there has been \"no functional improvement in patient's ability to manage his oropharyngeal secretions.\" Speech discussed this with wife. Intensive dysphagia treatment has been discontinued.      Severe malnutrition: has moderate/severe subcutaneous fat loss, moderate/severe muscle loss  - continuous tube feeding per dietician      Chronic kidney disease (CKD) stage 3a: Creatinine has been stable.     Anemia: hemoglobin stable. Continue to monitor.     Essential hypertension: BP controlled. 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 Sputum culture 6/19 showed MDR pseudomonas. On Tobramycin nebs 6/24/2021 - 7/9/2021.   - Continue pulmonary toilet, scheduled duonebs     Ventilator associated pneumonia: Completed antibiotics on July 18.     Severe debility, weakness, critical illness, deconditioning: Continue PT/OT     Hypercalcemia: unclear etiology, mild at this point. Parathyroid hormone normal. May be related to relative immobilization. Continue to monitor and adjust ca in tube feeds per RD recommendation.     Insomnia: Continue nighttime mirtazapine 7.5 mg scheduled.       Diet: Adult Formula Drip Feeding: Continuous [place] Peptide 1.5; Gastrostomy; Goal Rate: 90; mL/hr; Medication - Feeding Tube Flush Frequency: At least 15-30 mL water before and after medication administration and with tube clogging; Amount to Send ...    DVT Prophylaxis: Heparin SQ  Gutierrez Catheter: Not present  Central Lines: None  Code Status: Full Code      Disposition Plan   Expected discharge: to be determined. Barriers: excessive secretions needing frequent suctioning      Total floor/unit time spent was 25 minutes in reviewing the record, medications, lab results and completing documentation. 15 minutes spent in counseling and discussion with patient regarding diagnosis, medications and treatment plan. Care discussed and coordinated with nurse and Dr. Knox.     SISI Moore [facility]  Hospitalist Service  [facility]  Securely message with the Vocera Web Console (learn more here)  Text page via Pricing Assistant Paging/Directory                 ______________________________________________________________________    Interval History   Suctioned 3 times overnight. Continues to have thick white/yellow secretions needing less suctioning during the day, and more suctioning overnight. Tolerating PMV during the day and patient is able to say a few words which are partially understandable, whispers/mouths words. Trach dome with cuff deflated overnight. Nurse was  "present in room. No concerns overnight.  Patient is alert. Continues to be confused, restless at times, requiring hand mitts as he pulls at tubes. I asked patient how he was and he responded, \"I'm ok.\" He is smiling and making eye contact. No fever, chills, shortness of breath, chest pain, nausea or constipation.     Review of Systems: 12 point review of systems negative except for pertinent positives mentioned in HPI    Data reviewed today: I reviewed all medications, new labs and imaging results over the last 24 hours. I personally reviewed no images or EKG's today.    Physical Exam   Vital Signs: Temp: 97.7  F (36.5  C) Temp src: Oral BP: 133/81 Pulse: 85   Resp: 24 SpO2: 94 % O2 Device: None (Room air)    Weight: 149 lbs 9.6 oz  General Appearance: Alert, slightly restless, no acute distress   Respiratory: Tracheostomy with no visible secretions, lungs clear bilaterally, non labored   Cardiovascular: S1,S2, rhythm rate regular, negative murmur   GI: Bowel sounds positive x 3, nondistended, nontender, G Tube  Skin: pink, intact, no rash or edema   Neuro: VINH, alert, makes eye contact, partially understandable speech, moving all four extremities      Data   Recent Labs   Lab 08/30/21  0634 08/27/21  0808   WBC 7.1  --    HGB 10.5*  --    MCV 97  --      --     139   POTASSIUM 4.9 4.6   CHLORIDE 101 101   CO2 31 27   BUN 34* 31*   CR 0.90 0.84   ANIONGAP 10 11   RAGINI 11.3* 10.6*    123   ALBUMIN 2.9*  --      No results found for this or any previous visit (from the past 24 hour(s)).  Medications     dextrose       sodium chloride         acetylcysteine  2 mL Nebulization BID     armodafinil  150 mg Per Feeding Tube QAM     famotidine  20 mg Oral or Feeding Tube BID     heparin ANTICOAGULANT  5,000 Units Subcutaneous Q8H     ipratropium - albuterol 0.5 mg/2.5 mg/3 mL  3 mL Nebulization BID     lisinopril  40 mg Oral or Feeding Tube Daily     menthol-zinc oxide   Topical TID     mirtazapine  7.5 " mg Per Feeding Tube At Bedtime     scopolamine  1 patch Transdermal Q72H    And     scopolamine   Transdermal Q8H     venlafaxine  50 mg Per Feeding Tube TID w/meals     Restraint:     Patient pulls his IV line and trach.      Within an hour after restraint an in person face to face assessment was completed at 8:10 AM, including an evaluation of the patient's immediate reaction to the intervention, behavioral assessment and review/assessment of history, drugs and medications, recent labs, etc., and behavioral condition.  The patient experienced no adverse physical outcome from seclusion/restraint initiation.  The intervention of restraint or seclusion needs to continue

## 2021-09-01 NOTE — PLAN OF CARE
Problem: Adult Inpatient Plan of Care  Goal: Absence of Hospital-Acquired Illness or Injury  Outcome: Improving  Intervention: Identify and Manage Fall Risk  Recent Flowsheet Documentation  Taken 9/1/2021 0247 by Елена Rai RN  Safety Promotion/Fall Prevention:   bed alarm on   room door open   room near nurse's station   safety round/check completed  Intervention: Prevent Infection  Recent Flowsheet Documentation  Taken 9/1/2021 0247 by Елена Rai RN  Infection Prevention:   cohorting utilized   hand hygiene promoted   personal protective equipment utilized   rest/sleep promoted  Goal: Optimal Comfort and Wellbeing  9/1/2021 0614 by Елена Rai RN  Outcome: Improving  9/1/2021 0436 by Елена Rai RN  Outcome: Improving  Goal: Readiness for Transition of Care  9/1/2021 0614 by Елена Rai RN  Outcome: Improving  9/1/2021 0436 by Елена Rai RN  Outcome: Improving     Problem: Anxiety  Goal: Anxiety Reduction or Resolution  9/1/2021 0614 by Елена Rai RN  Outcome: Improving  9/1/2021 0436 by Елена Rai RN  Outcome: Improving     Problem: Restraint for Non-Violent/Non-Self-Destructive Behavior  Goal: Prevent/Manage Potential Problems  Description: Maintain safety of patient and others during period of restraint.  Promote psychological and physical wellbeing.  Prevent injury to skin and involved body parts.  Promote nutrition, hydration, and elimination.  Outcome: Improving     Problem: Skin and Tissue Injury (Artificial Airway)  Goal: Absence of Device-Related Skin or Tissue Injury  Outcome: Improving     Problem: Communication Impairment (Artificial Airway)  Goal: Effective Communication  Outcome: Improving   Patient anxious at times during cares, hands active.  Patient is redirectable; continues on mitts; denied pain.  Patient cooperative with cares; slept most of the shift.  Trach is capped; tolerated suctioning; breathing regular, unlabored.

## 2021-09-01 NOTE — PLAN OF CARE
RT PROGRESS NOTE     DATA:     CURRENT SETTINGS:             TRACH TYPE / SIZE:  #6 Bivona (placed 8/29)             MODE:   Capped             FIO2:   RA     ACTION:             THERAPIES:   Duoneb BID, Mucomyst BID, Metaneb BID             SUCTION:                           FREQUENCY:   x3                        AMOUNT:   Small to Copious                        CONSISTENCY:   Thick                        COLOR:   Pale Yellow             SPONTANEOUS COUGH EFFORT/STRENGTH OF EFFORT (not elicited by suctioning): Strong Cough                           WEANING PHASE:   Phase 3                        WEAN MODE:    Capped RA                        WEAN TIME:   Continuous (Since 8/31)                        END WEAN REASON:   NA     RESPONSE:             BS:   Coarse/Diminished             VITAL SIGNS:   Sating 94-95%, HR 82-93, RR 24             EMOTIONAL NEEDS / CONCERNS:  NA                RISK FOR SELF DECANNULATION:  Yes             RISK DUE TO:  Agitation/Confusion             INTERVENTION/S IN PLACE IS/ARE:  Mits       NOTE / PLAN:   Pt has a hard time clearing own secretions.  RT will continue to monitor.

## 2021-09-01 NOTE — PROGRESS NOTES
RENAL PROGRESS NOTE    HPI: 70 year old male with past medical history of hypertension, admitted with subarachnoid hemorrhage after a fall on 5/15/2021 s/p emergent left external ventricular drain placement with posterior communicating artery aneurysm defied structured underwent craniotomy with clipping of aneurysm , complicated by persistent cerebral vasospasms and bilateral BAYLEE infarct.  Course also complicated by respiratory failure with intubation pneumonia was treated with cefepime and Vanco, transferred to LTAC on 6/11/2021.  Nephrology consulted for hypercalcemia on 8/15/2021     Hypercalcemia: moderate, d/t relative immobility with prolonged hospitalization, and CKD/reduced clearance  Corrected Ca 12's   PTH 26 on 8/11/2021, vitamin D level 30 8/15/2021  No history suggestive of any lymphoma or lymphadenopathy or any granulomatosis related disorders to suggest any additional etiology.  PLAN:  Continue to hold all Ca/D suppl  Free water 200cc q 4  Lower Ca TF  Net neutral last few days  Charley in a.m.     Volume:  Appears euvolemic despite net +25L by doc, has fournier for accurate I/o and net neutral last few days, charley labs in a.m., consider Low dose loop if Ca still elevated.     CKD 3:  With baseline crea 0.9, 24 hour cr clearance estimates GFR at 45     Hypertension BP stable on single agent lisinopril 40 mg     Anemia:  hgb trending 10-11 and stable, likely Inflammatory primary   Mild management per hospitalist medicine     Acute on chronic hypoxemic respiratory failure  S/p trach  S/p tx ESBL Pseudomonas and Klebsiella pneumonia  Pulmonary following  Currently off antibiotics     Malnutrition dysphagia after CVA, now on tube feed, adjusted for lower calcium      SAH w fall sp craniotomy and hematoma evacuation , PCA aneurysm clip  cb BAYLEE stroke ad vasopasm  Now on rehab, speech and OT/PT therapyies     Insomnia/mood disorders  On Ritalin and mirtazapine  Management per primary  medicine     Metabolic encephalopathy stable    SUBJECTIVE:  Pt is non-verbal,, discussed with covering RN and PT, pt seen while in PT, trach is capped, breath sounds clear, no sig edema.  Non verbal. Working with PT/OT needs repeaeted guidance     OBJECTIVE:  Physical Exam   Temp: 97.7  F (36.5  C) Temp src: Oral BP: 133/81 Pulse: 85   Resp: 24 SpO2: 96 % O2 Device: None (Room air)    Vitals:    08/29/21 0705 08/30/21 0400 09/01/21 0600   Weight: 67.8 kg (149 lb 7 oz) 67.9 kg (149 lb 11.2 oz) 67.9 kg (149 lb 9.6 oz)     Vital Signs with Ranges  Temp:  [97.7  F (36.5  C)-98.3  F (36.8  C)] 97.7  F (36.5  C)  Pulse:  [82-86] 85  Resp:  [16-24] 24  BP: (114-138)/(77-82) 133/81  FiO2 (%):  [21 %] 21 %  SpO2:  [93 %-96 %] 96 %  I/O last 3 completed shifts:  In: 3028 [NG/GT:3028]  Out: 2700 [Urine:2700]      Patient Vitals for the past 72 hrs:   Weight   09/01/21 0600 67.9 kg (149 lb 9.6 oz)   08/30/21 0400 67.9 kg (149 lb 11.2 oz)       Intake/Output Summary (Last 24 hours) at 8/30/2021 1049  Last data filed at 8/30/2021 0519  Gross per 24 hour   Intake 3693 ml   Output 400 ml   Net 3293 ml       PHYSICAL EXAM:  General - Alert, uncertqain if oriented, he is non-verbal, sitting up in WC bedside, seen while in PT room working on therapies.   Cardiovascular - Regular rate and rhythm   Respiratory -trach capped, on RA, lungs CTA  Extremities - No lower extremity edema bilaterally  Neuro:  Exam limited by speech therapy and up in wheelchair, non-verbal, not moving exgtremities for me  MSK:  Grossly intact, but global debit.   Psych:  flat affect    LABORATORY STUDIES:     Recent Labs   Lab 08/30/21  0634   WBC 7.1   RBC 3.30*   HGB 10.5*   HCT 32.0*          Basic Metabolic Panel:  Recent Labs   Lab 08/30/21  0634 08/27/21  0808    139   POTASSIUM 4.9 4.6   CHLORIDE 101 101   CO2 31 27   BUN 34* 31*   CR 0.90 0.84    123   RAGINI 11.3* 10.6*       INRNo lab results found in last 7 days.     Recent Labs    Lab Test 08/30/21  0634 08/23/21  0653 05/16/21  0405 05/15/21  2125 05/15/21  1222   INR  --   --   --  1.20* 1.16*   WBC 7.1 8.1   < > 12.3* 16.5*   HGB 10.5* 10.8*   < > 11.8* 13.6    377   < > 239 257    < > = values in this interval not displayed.       Personally reviewed current labs     ~ 25 minutes spent in exam, POC, education regarding renal disease and management.  >90% time spent in education and counseling and/or discussion with patient's care team    GUILHERME Ramires-BC  [facility]  [phone]

## 2021-09-01 NOTE — PLAN OF CARE
Problem: Adult Inpatient Plan of Care  Goal: Optimal Comfort and Wellbeing  Outcome: Improving  Intervention: Provide Person-Centered Care  Recent Flowsheet Documentation  Taken 8/31/2021 1605 by Rehana Brown RN  Trust Relationship/Rapport:   care explained   emotional support provided   reassurance provided   thoughts/feelings acknowledged   questions answered     Problem: Adult Inpatient Plan of Care  Goal: Readiness for Transition of Care  Outcome: Improving  Pt still confused, monitored for safety on restraints. no sign of pain noted. Cares and comfort provided.

## 2021-09-02 ENCOUNTER — APPOINTMENT (OUTPATIENT)
Dept: SPEECH THERAPY | Facility: CLINIC | Age: 71
DRG: 207 | End: 2021-09-02
Attending: HOSPITALIST
Payer: COMMERCIAL

## 2021-09-02 ENCOUNTER — APPOINTMENT (OUTPATIENT)
Dept: OCCUPATIONAL THERAPY | Facility: CLINIC | Age: 71
DRG: 207 | End: 2021-09-02
Attending: HOSPITALIST
Payer: COMMERCIAL

## 2021-09-02 ENCOUNTER — APPOINTMENT (OUTPATIENT)
Dept: PHYSICAL THERAPY | Facility: CLINIC | Age: 71
DRG: 207 | End: 2021-09-02
Attending: HOSPITALIST
Payer: COMMERCIAL

## 2021-09-02 LAB
ALBUMIN SERPL-MCNC: 3.1 G/DL (ref 3.5–5)
ANION GAP SERPL CALCULATED.3IONS-SCNC: 9 MMOL/L (ref 5–18)
BUN SERPL-MCNC: 42 MG/DL (ref 8–22)
CALCIUM SERPL-MCNC: 10.9 MG/DL (ref 8.5–10.5)
CHLORIDE BLD-SCNC: 103 MMOL/L (ref 98–107)
CO2 SERPL-SCNC: 31 MMOL/L (ref 22–31)
CREAT SERPL-MCNC: 0.82 MG/DL (ref 0.7–1.3)
GFR SERPL CREATININE-BSD FRML MDRD: >90 ML/MIN/1.73M2
GLUCOSE BLD-MCNC: 118 MG/DL (ref 70–125)
PHOSPHATE SERPL-MCNC: 3.4 MG/DL (ref 2.5–4.5)
POTASSIUM BLD-SCNC: 4.7 MMOL/L (ref 3.5–5)
SODIUM SERPL-SCNC: 143 MMOL/L (ref 136–145)

## 2021-09-02 PROCEDURE — 250N000011 HC RX IP 250 OP 636

## 2021-09-02 PROCEDURE — 250N000013 HC RX MED GY IP 250 OP 250 PS 637: Performed by: INTERNAL MEDICINE

## 2021-09-02 PROCEDURE — 97530 THERAPEUTIC ACTIVITIES: CPT | Mod: GP | Performed by: PHYSICAL THERAPIST

## 2021-09-02 PROCEDURE — 94640 AIRWAY INHALATION TREATMENT: CPT

## 2021-09-02 PROCEDURE — 250N000013 HC RX MED GY IP 250 OP 250 PS 637: Performed by: NURSE PRACTITIONER

## 2021-09-02 PROCEDURE — 92507 TX SP LANG VOICE COMM INDIV: CPT | Mod: GN

## 2021-09-02 PROCEDURE — 99232 SBSQ HOSP IP/OBS MODERATE 35: CPT | Performed by: NURSE PRACTITIONER

## 2021-09-02 PROCEDURE — 250N000013 HC RX MED GY IP 250 OP 250 PS 637

## 2021-09-02 PROCEDURE — 97129 THER IVNTJ 1ST 15 MIN: CPT | Mod: GO | Performed by: OCCUPATIONAL THERAPIST

## 2021-09-02 PROCEDURE — 94660 CPAP INITIATION&MGMT: CPT

## 2021-09-02 PROCEDURE — 97112 NEUROMUSCULAR REEDUCATION: CPT | Mod: GP | Performed by: PHYSICAL THERAPIST

## 2021-09-02 PROCEDURE — 250N000009 HC RX 250: Performed by: INTERNAL MEDICINE

## 2021-09-02 PROCEDURE — 94668 MNPJ CHEST WALL SBSQ: CPT

## 2021-09-02 PROCEDURE — 999N000123 HC STATISTIC OXYGEN O2DAILY TECH TIME

## 2021-09-02 PROCEDURE — 999N000009 HC STATISTIC AIRWAY CARE

## 2021-09-02 PROCEDURE — 36415 COLL VENOUS BLD VENIPUNCTURE: CPT | Performed by: NURSE PRACTITIONER

## 2021-09-02 PROCEDURE — 82040 ASSAY OF SERUM ALBUMIN: CPT | Performed by: NURSE PRACTITIONER

## 2021-09-02 PROCEDURE — 94640 AIRWAY INHALATION TREATMENT: CPT | Mod: 76

## 2021-09-02 PROCEDURE — 250N000013 HC RX MED GY IP 250 OP 250 PS 637: Performed by: HOSPITALIST

## 2021-09-02 PROCEDURE — 97530 THERAPEUTIC ACTIVITIES: CPT | Mod: GO | Performed by: OCCUPATIONAL THERAPIST

## 2021-09-02 PROCEDURE — 120N000001 HC R&B MED SURG/OB

## 2021-09-02 PROCEDURE — 999N000197 HC STATISTIC WOC PT EDUCATION, 0-15 MIN

## 2021-09-02 PROCEDURE — 999N000157 HC STATISTIC RCP TIME EA 10 MIN

## 2021-09-02 RX ORDER — FUROSEMIDE 10 MG/ML
20 INJECTION INTRAMUSCULAR; INTRAVENOUS ONCE
Status: DISCONTINUED | OUTPATIENT
Start: 2021-09-02 | End: 2021-09-02

## 2021-09-02 RX ORDER — FUROSEMIDE 20 MG
20 TABLET ORAL ONCE
Status: COMPLETED | OUTPATIENT
Start: 2021-09-02 | End: 2021-09-02

## 2021-09-02 RX ADMIN — FAMOTIDINE 20 MG: 20 TABLET, FILM COATED ORAL at 21:11

## 2021-09-02 RX ADMIN — FUROSEMIDE 20 MG: 20 TABLET ORAL at 13:32

## 2021-09-02 RX ADMIN — ACETYLCYSTEINE 2 ML: 200 SOLUTION ORAL; RESPIRATORY (INHALATION) at 07:14

## 2021-09-02 RX ADMIN — ARMODAFINIL 150 MG: 150 TABLET ORAL at 05:25

## 2021-09-02 RX ADMIN — VENLAFAXINE 50 MG: 25 TABLET ORAL at 17:56

## 2021-09-02 RX ADMIN — HEPARIN SODIUM 5000 UNITS: 5000 INJECTION, SOLUTION INTRAVENOUS; SUBCUTANEOUS at 21:11

## 2021-09-02 RX ADMIN — FAMOTIDINE 20 MG: 20 TABLET, FILM COATED ORAL at 09:02

## 2021-09-02 RX ADMIN — ANORECTAL OINTMENT: 15.7; .44; 24; 20.6 OINTMENT TOPICAL at 09:02

## 2021-09-02 RX ADMIN — ANORECTAL OINTMENT: 15.7; .44; 24; 20.6 OINTMENT TOPICAL at 13:32

## 2021-09-02 RX ADMIN — IPRATROPIUM BROMIDE AND ALBUTEROL SULFATE 3 ML: 2.5; .5 SOLUTION RESPIRATORY (INHALATION) at 18:04

## 2021-09-02 RX ADMIN — ANORECTAL OINTMENT: 15.7; .44; 24; 20.6 OINTMENT TOPICAL at 21:12

## 2021-09-02 RX ADMIN — IPRATROPIUM BROMIDE AND ALBUTEROL SULFATE 3 ML: 2.5; .5 SOLUTION RESPIRATORY (INHALATION) at 07:14

## 2021-09-02 RX ADMIN — MIRTAZAPINE 7.5 MG: 7.5 TABLET ORAL at 21:11

## 2021-09-02 RX ADMIN — ACETYLCYSTEINE 2 ML: 200 SOLUTION ORAL; RESPIRATORY (INHALATION) at 18:04

## 2021-09-02 RX ADMIN — VENLAFAXINE 50 MG: 25 TABLET ORAL at 13:31

## 2021-09-02 RX ADMIN — VENLAFAXINE 50 MG: 25 TABLET ORAL at 09:02

## 2021-09-02 RX ADMIN — HEPARIN SODIUM 5000 UNITS: 5000 INJECTION, SOLUTION INTRAVENOUS; SUBCUTANEOUS at 05:32

## 2021-09-02 RX ADMIN — LISINOPRIL 40 MG: 20 TABLET ORAL at 09:02

## 2021-09-02 RX ADMIN — HEPARIN SODIUM 5000 UNITS: 5000 INJECTION, SOLUTION INTRAVENOUS; SUBCUTANEOUS at 13:32

## 2021-09-02 NOTE — PLAN OF CARE
Problem: Device-Related Complication Risk (Artificial Airway)  Goal: Optimal Device Function  Outcome: Improving     Problem: Skin and Tissue Injury (Artificial Airway)  Goal: Absence of Device-Related Skin or Tissue Injury  Outcome: Improving     Problem: Communication Impairment (Artificial Airway)  Goal: Effective Communication  Outcome: Improving       RT PROGRESS NOTE     DATA:     CURRENT SETTINGS:             TRACH TYPE / SIZE:  #6 Bivona placed on 08/29/21             MODE:   Trach capped             FIO2:   RA     ACTION:             THERAPIES:   MUCOMYST BID/DUO NEB BID/MET NEB BID             SUCTION:                           FREQUENCY:  X4                        AMOUNT:   Moderate to large                        CONSISTENCY:   Thick                        COLOR:   White/yellow             SPONTANEOUS COUGH EFFORT/STRENGTH OF EFFORT (not elicited by suctioning): Yes:Strong                              WEANING PHASE:  #3                        WEAN MODE:    Trach capped as reji                        WEAN TIME:                           END WEAN REASON:   Cont     RESPONSE:             BS:   Coarse             VITAL SIGNS:   SAT 94-97%, HR 91-96, RR 20-22             EMOTIONAL NEEDS / CONCERNS: N/A             RISK FOR SELF DECANNULATION:  YES                        RISK DUE TO:  Restless                        INTERVENTION/S IN PLACE IS/ARE:  Bilateral mitts     NOTE / PLAN:   Pt is on trach capped with RA, reji well. Cont current therapy and keep sat>/=90%

## 2021-09-02 NOTE — PROGRESS NOTES
RENAL PROGRESS NOTE    HPI: 70 year old male with past medical history of hypertension, admitted with subarachnoid hemorrhage after a fall on 5/15/2021 s/p emergent left external ventricular drain placement with posterior communicating artery aneurysm defied structured underwent craniotomy with clipping of aneurysm , complicated by persistent cerebral vasospasms and bilateral BAYLEE infarct.  Course also complicated by respiratory failure with intubation pneumonia was treated with cefepime and Vanco, transferred to LTAC on 6/11/2021.  Nephrology consulted for hypercalcemia on 8/15/2021     Hypercalcemia: moderate and improved, d/t relative immobility with prolonged hospitalization, and CKD/reduced clearance  Corrected Ca 12's   PTH 26 on 8/11/2021, vitamin D level 30 8/15/2021  No history suggestive of any lymphoma or lymphadenopathy or any granulomatosis related disorders to suggest any additional etiology.  PLAN:  Continue to hold all Ca/D suppl  Free water 200cc q 4  Lower Ca TF  Lasix IV x 1 today    Volume:  Appears euvolemic despite net +25L by doc, has fournier for accurate I/o and net neutral last few days, charley labs in a.m  PLAN:  Net up, Lasix x 1 today for hyperCa and volume      CKD 3:  With baseline crea 0.9, 24 hour cr clearance estimates GFR at 45     Hypertension BP stable on single agent lisinopril 40 mg     Anemia:  hgb trending 10-11 and stable, likely Inflammatory primary   Mild management per hospitalist medicine     Acute on chronic hypoxemic respiratory failure: weaning, tolerated trach capping, keep a bit on the dry side to promote resp status, Lasix otday   S/p trach  S/p tx ESBL Pseudomonas and Klebsiella pneumonia  Pulmonary following  Currently off antibiotics     Malnutrition dysphagia after CVA, now on tube feed, adjusted for lower calcium      SAH w fall sp craniotomy and hematoma evacuation , PCA aneurysm clip  cb BAYLEE stroke ad vasopasm  Now on rehab, speech and OT/PT  therapyies     Insomnia/mood disorders  On Ritalin and mirtazapine  Management per primary medicine     Metabolic encephalopathy stable    SUBJECTIVE:  Pt is non-verbal,, discussed with covering RN and twin sister on ipad, no acute issues overnoc     OBJECTIVE:  Physical Exam   Temp: 98  F (36.7  C) Temp src: Oral BP: 133/86 Pulse: 92   Resp: 22 SpO2: 96 % O2 Device: None (Room air)    Vitals:    08/29/21 0705 08/30/21 0400 09/01/21 0600   Weight: 67.8 kg (149 lb 7 oz) 67.9 kg (149 lb 11.2 oz) 67.9 kg (149 lb 9.6 oz)     Vital Signs with Ranges  Temp:  [97.2  F (36.2  C)-98  F (36.7  C)] 98  F (36.7  C)  Pulse:  [83-96] 92  Resp:  [20-24] 22  BP: (123-133)/(80-86) 133/86  FiO2 (%):  [21 %] 21 %  SpO2:  [94 %-97 %] 96 %  I/O last 3 completed shifts:  In: 3274 [NG/GT:3274]  Out: 1075 [Urine:1075]      Patient Vitals for the past 72 hrs:   Weight   09/01/21 0600 67.9 kg (149 lb 9.6 oz)       Intake/Output Summary (Last 24 hours) at 8/30/2021 1049  Last data filed at 8/30/2021 0519  Gross per 24 hour   Intake 3693 ml   Output 400 ml   Net 3293 ml       PHYSICAL EXAM:  General - Alert, uncertqain if oriented, he is non-verbal, sitting up in WC bedside  Cardiovascular - Regular rate and rhythm   Respiratory -trach capped, on RA, lungs CTA by ant exam only d/t positioning  Extremities - No lower extremity edema bilaterally  Neuro:  Exam limited by speech therapy and up in wheelchair, non-verbal, not moving exgtremities for me  MSK:  Grossly intact, but global debit.   Psych:  flat affect  Net +, wt stable.     LABORATORY STUDIES:     Recent Labs   Lab 08/30/21  0634   WBC 7.1   RBC 3.30*   HGB 10.5*   HCT 32.0*          Basic Metabolic Panel:  Recent Labs   Lab 09/02/21  0602 08/30/21  0634 08/27/21  0808    142 139   POTASSIUM 4.7 4.9 4.6   CHLORIDE 103 101 101   CO2 31 31 27   BUN 42* 34* 31*   CR 0.82 0.90 0.84    113 123   RAGINI 10.9* 11.3* 10.6*       INRNo lab results found in last 7 days.      Recent Labs   Lab Test 08/30/21  0634 08/23/21  0653 05/16/21  0405 05/15/21  2125 05/15/21  1222   INR  --   --   --  1.20* 1.16*   WBC 7.1 8.1   < > 12.3* 16.5*   HGB 10.5* 10.8*   < > 11.8* 13.6    377   < > 239 257    < > = values in this interval not displayed.       Personally reviewed current labs     ~ 25 minutes spent in exam, POC, education regarding renal disease and management.  >90% time spent in education and counseling and/or discussion with patient's care team    GUILHERME Ramires-BC  [facility]  [phone]

## 2021-09-02 NOTE — PROGRESS NOTES
Social Work Note:  Patient chart reviewed.  Patient discussed in morning rounds.  Barriers to discharge: mitts x2.  Frequent suction x4 (moderate/large).  Trach in place.    It is likely patient will require placement at the time of discharge from [facility]ACH.  Patient will likley require SNF/TCU placement.      Patient's exact discharge date, time, disposition TBD.  SW will continue to follow for psychosocial and emotional support of patient and family. SW to facilitate discharge to SNF/TCU when pt no longer requires LTACH level of care.      Dayron Chen, [facility] HospitalMARY KATE  [facility], [place]  [phone]

## 2021-09-02 NOTE — PLAN OF CARE
Problem: Device-Related Complication Risk (Artificial Airway)  Goal: Optimal Device Function  Outcome: Improving     Problem: Skin and Tissue Injury (Artificial Airway)  Goal: Absence of Device-Related Skin or Tissue Injury  Outcome: Improving     Problem: Communication Impairment (Artificial Airway)  Goal: Effective Communication  Outcome: Improving        RT PROGRESS NOTE     DATA:     CURRENT SETTINGS:             TRACH TYPE / SIZE:  #6 Bivona changed on 8/29/21             MODE:   Cap             FIO2:   21%     ACTION:             THERAPIES:   Duoneb x2, mucomyst x2, metaneb x2             SUCTION:                           FREQUENCY:   x3                        AMOUNT:   large to sm                        CONSISTENCY:   thick                        COLOR:   white             SPONTANEOUS COUGH EFFORT/STRENGTH OF EFFORT (not elicited by suctioning): strong                              WEANING PHASE:  3                        WEAN MODE:    trach capped                        WEAN TIME:   continuous                        END WEAN REASON:        RESPONSE:             BS:   diminished and clear             VITAL SIGNS:   SPO2 91-96%, HR 84-92, RR 16-20             EMOTIONAL NEEDS / CONCERNS:  confuse                RISK FOR SELF DECANNULATION:  No                        RISK DUE TO:  No                        INTERVENTION/S IN PLACE IS/ARE:         NOTE / PLAN:     Pt to cont with trach capped as reji.

## 2021-09-02 NOTE — PLAN OF CARE
Problem: Adult Inpatient Plan of Care  Goal: Absence of Hospital-Acquired Illness or Injury  Intervention: Identify and Manage Fall Risk  Recent Flowsheet Documentation  Taken 9/1/2021 1720 by Mariam Robles RN  Safety Promotion/Fall Prevention:   bed alarm on   room door open   room near nurse's station     Vital signs stable Pt alert and confused. Pt denied pain.  Pt spent time up in chair. All care needs met.

## 2021-09-02 NOTE — PROGRESS NOTES
[facility]    Medicine Progress Note - Hospitalist Service       Date of Admission:  6/11/2021  Summary:  Dayron Neri is a 70 year male with h/o HTN who presented to ED on 5/15/2021 with acute SAH after a fall with unresponsiveness.  He underwent emergent left external ventricular drain placement.  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  On 5/28/2021, patient had persistent cerebral vasospasm, CT head showed bilateral BAYLEE infarct and was treated with milrinone and verapamil.  On 6/1/2021, angiogram showed no evidence of vasospasm.  Extubated on 6/3/2021.  6/6/2021 he was reintubated due to hypoxia.  On 6/7/2021, patient underwent tracheostomy and PEG tube placement.  EVD removed 6/9/2021.   Patient was treated for possible pneumonia with Unasyn on 5/20/2021, It was switched to ceftriaxone the next day for sputum culture growing Klebsiella. On 5/23, sputum culture also grew Pseudomonas. Antibiotic was switched to Zosyn.  Due to increased WBCs in CSF, possibility of ventriculitis was raised. CSF cult  +ure was negative. Antibiotic was switched to cefepime and vancomycin on 5/28/2021 for 2 weeks.  Vanco was discontinued on 6/9/2021. On 6/5/2021, sputum grew ESBL so cefepime was switched to meropenem. He was transferred to [facility] on 6/11/21.      Patient sputum also grew MDR Pseudomonas on 6/19 and was treated with tobramycin nebs from 6/24/2021 to 7/9/2021.  Patient also had an episode of aspiration with his tube feeds, and was treated with ceftazidime-IV Bactrim along with Flagyl for possible aspiration from 7/9/2020 127/18/21.  He has had repeated bouts of Klebsiella infection in sputum and ventilator associated pneumonia.     Since admission to [facility], patient did not make significant progression for his neurology status. Trach weaning is slow due to his cognitive impairment.     Assessment & Plan             Acute on chronic hypoxemic  "respiratory failure:   -S/p treatment of pseudomonas ESBL and Klebsiella pneumonia.  -Has excessive secretions.   -Weaning has been slow due to his cognitive impairment.  - Continue to wean per RT and pulmonary, trach capped today.  - Airway secretion: Glycopyrrolate was tried and stopped. Currently on Scopolamine (started 7/20/2021)  - Continue  Duoneb, acetylcysteine, and 3% saline nebs      Traumatic brain injury/intracranial hemorrhage/acute ischemic stroke: had ruptured posterior communicating aneurysm s/p craniotomy with clipping of PCOM aneurysm and bilateral BAYLEE infarct in May 2021. Last CT head done on 7/1/21, which shows resolution of previous multicompartment hemorrhage in the right frontal lobe and the ventricles.   - Recommendation from Dr. Martinez with Neurosurgery for repeat CTA of head and and neck in 3 months, which will be mid September.      Acute metabolic encephalopathy/cognitive impairment: His current neurology status is likely his new baseline due to frontal lobe injury.  Unclear if he has the capacity to focus and learn what they are attempting to teach him during therapy.  - Continue Effexor and Nuvigil. Staff reported to wife that patient was possibly hallucinating. Neurological status appears at baseline today, without increased agitation or confusion. Will continue to monitor. Consider decreasing dose of Nuvigil if there is concern for increased delirium.      Dysphagia: On tube feeds and NPO.   - Speech therapy following. Per note from 8/17/21, despite several weeks of working with patient there has been \"no functional improvement in patient's ability to manage his oropharyngeal secretions.\" Speech discussed this with wife last week. Intensive dysphagia treatment has been discontinued but speech continues to follow     Severe malnutrition: has moderate/severe subcutaneous fat loss, moderate/severe muscle loss  - continuous tube feeding per dietician      Chronic kidney disease (CKD) " stage 3a: Creatinine has been stable.     Anemia: hemoglobin stable. Continue to monitor.     Essential hypertension: BP controlled. 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 Sputum culture 6/19 showed MDR pseudomonas. On Tobramycin nebs 6/24/2021 - 7/9/2021.   - Continue pulmonary toilet, scheduled duonebs     Ventilator associated pneumonia: Completed antibiotics on July 18.     Severe debility, weakness, critical illness, deconditioning: Continue PT/OT     Hypercalcemia: unclear etiology, mild at this point. Parathyroid hormone normal. May be related to relative immobilization. Continue to monitor and adjust ca in tube feeds per RD recommendation.     Insomnia: Continue nighttime mirtazapine 7.5 mg scheduled. Was awake for 50% of the shift overnight. Promote rest. Consider increasing mirtazapine if patient continues to not sleep well      Diet: Adult Formula Drip Feeding: Continuous [place] Peptide 1.5; Gastrostomy; Goal Rate: 90; mL/hr; Medication - Feeding Tube Flush Frequency: At least 15-30 mL water before and after medication administration and with tube clogging; Amount to Send ...    DVT Prophylaxis: Heparin SQ  Gutierrez Catheter: Not present  Central Lines: None  Code Status: Full Code      Disposition Plan   Expected discharge: to be determined. Barriers: excessive secretions needing frequent suctioning     Total floor/unit time spent was 25 minutes in reviewing the record, medications, lab results and completing documentation. 15 minutes spent in counseling and discussion with patient regarding diagnosis, medications and treatment plan. Care discussed and coordinated with nurse. Spoke with patient's wife Susy Neri via bedside iPad.     [facility]  Hospitalist Service  LTACH  Securely message with the Vocera Web Console (learn more here)  Text page via [facility] Paging/Directory                  ______________________________________________________________________    Interval History   Patient was up to the chair.  He is alert, calm, breathing nonlabored, whispering words which are partially understandable.  Baseline confusion, oriented to self, agitated at times.  Continues to need bilateral hand mitts as he is at risk for pulling on trach or G-tube.  Respiratory therapy has been suctioning patient 3-4 times at night.  Has copious secretions in the morning which require suctioning, however he is tolerating the Passy-Deja valve during the day and not needing as much suctioning during the day.  Staff mention to patient's wife yesterday that patient may have increased confusion, perhaps hallucinations and she is wondering if the Nuvigil is contributing.  On exam I do not see any increased delirium but staff will continue to monitor.  Patient's wife is also concerned with prolonged sitting during the day.  We discussed that patient should not be up in the chair for more than 4 hours at a time, and should be repositioned every 1-2 hours to avoid skin breakdown.  Nurse was present in the room as Susy and I discussed chair time and repositioning.  Patient has been dozing off more during the day.  He recently changed rooms and is now next to the nurses station.  Increased noise may be contributing to less sleep at night. Overnight nurse charted that patient was awake about 50% of the shift.  No fever, chills, wheezing, labored breathing, pain or nausea.  Continues to have soft stools due to tube feeding.    Data reviewed today: I reviewed all medications, new labs and imaging results over the last 24 hours. I personally reviewed no images or EKG's today.    Physical Exam   Vital Signs: Temp: 98  F (36.7  C) Temp src: Oral BP: 133/86 Pulse: 92   Resp: 22 SpO2: 96 % O2 Device: None (Room air)    Weight: 149 lbs 9.6 oz  General Appearance:  Alert, slightly restless, no acute distress   Respiratory: Tracheostomy  with no visible secretions, lungs clear bilaterally, non labored   Cardiovascular: S1,S2, rhythm rate regular, negative murmur   GI: Bowel sounds positive x 3, nondistended, nontender, G Tube  Skin: pink, intact, no rash or edema   Neuro: VINH, alert, makes eye contact, partially understandable speech, moving all four extremities         Data   Recent Labs   Lab 09/02/21  0602 08/30/21  0634 08/27/21  0808   WBC  --  7.1  --    HGB  --  10.5*  --    MCV  --  97  --    PLT  --  340  --     142 139   POTASSIUM 4.7 4.9 4.6   CHLORIDE 103 101 101   CO2 31 31 27   BUN 42* 34* 31*   CR 0.82 0.90 0.84   ANIONGAP 9 10 11   RAGINI 10.9* 11.3* 10.6*    113 123   ALBUMIN 3.1* 2.9*  --      No results found for this or any previous visit (from the past 24 hour(s)).  Medications     dextrose       sodium chloride         acetylcysteine  2 mL Nebulization BID     armodafinil  150 mg Per Feeding Tube QAM     famotidine  20 mg Oral or Feeding Tube BID     heparin ANTICOAGULANT  5,000 Units Subcutaneous Q8H     ipratropium - albuterol 0.5 mg/2.5 mg/3 mL  3 mL Nebulization BID     lisinopril  40 mg Oral or Feeding Tube Daily     menthol-zinc oxide   Topical TID     mirtazapine  7.5 mg Per Feeding Tube At Bedtime     scopolamine  1 patch Transdermal Q72H    And     scopolamine   Transdermal Q8H     venlafaxine  50 mg Per Feeding Tube TID w/meals     Restraint:     Patient pulls his IV line and trach.      Within an hour after restraint an in person face to face assessment was completed at 8:45 AM, including an evaluation of the patient's immediate reaction to the intervention, behavioral assessment and review/assessment of history, drugs and medications, recent labs, etc., and behavioral condition.  The patient experienced no adverse physical outcome from seclusion/restraint initiation.  The intervention of restraint or seclusion needs to continue

## 2021-09-02 NOTE — PLAN OF CARE
Problem: Adult Inpatient Plan of Care  Goal: Optimal Comfort and Wellbeing  Outcome: Improving  Intervention: Provide Person-Centered Care  Recent Flowsheet Documentation  Taken 9/2/2021 0113 by Елена Rai RN  Trust Relationship/Rapport: care explained  Goal: Readiness for Transition of Care  Outcome: Improving     Problem: Anxiety  Goal: Anxiety Reduction or Resolution  Outcome: Improving     Problem: Restraint for Non-Violent/Non-Self-Destructive Behavior  Goal: Prevent/Manage Potential Problems  Description: Maintain safety of patient and others during period of restraint.  Promote psychological and physical wellbeing.  Prevent injury to skin and involved body parts.  Promote nutrition, hydration, and elimination.  Outcome: Improving     Problem: Adult Inpatient Plan of Care  Goal: Absence of Hospital-Acquired Illness or Injury  Intervention: Identify and Manage Fall Risk  Recent Flowsheet Documentation  Taken 9/2/2021 0113 by Елена Rai RN  Safety Promotion/Fall Prevention:   bed alarm on   fall prevention program maintained   room door open   room near nurse's station   safety round/check completed  Intervention: Prevent and Manage VTE (Venous Thromboembolism) Risk  Recent Flowsheet Documentation  Taken 9/2/2021 0113 by Елена Rai RN  VTE Prevention/Management: anticoagulant therapy maintained  Intervention: Prevent Infection  Recent Flowsheet Documentation  Taken 9/2/2021 0113 by Елена Rai RN  Infection Prevention:   hand hygiene promoted   personal protective equipment utilized   rest/sleep promoted     Problem: Device-Related Complication Risk (Mechanical Ventilation, Invasive)  Goal: Optimal Device Function  Intervention: Optimize Device Care and Function  Recent Flowsheet Documentation  Taken 9/2/2021 0113 by Елена Rai RN  Airway Safety Measures:   all equipment/monitors on and audible   suction at bedside   suction regulator   suction equipment   oxygen flowmeter      Problem: Ventilator-Induced Lung Injury (Mechanical Ventilation, Invasive)  Goal: Absence of Ventilator-Induced Lung Injury  Intervention: Prevent Ventilator-Associated Pneumonia  Recent Flowsheet Documentation  Taken 9/2/2021 0536 by Елена Rai, RN  Oral Care: swabbed with antiseptic solution     Problem: Device-Related Complication Risk (Artificial Airway)  Goal: Optimal Device Function  Intervention: Optimize Device Care and Function  Recent Flowsheet Documentation  Taken 9/2/2021 0536 by Елена Rai, RN  Oral Care: swabbed with antiseptic solution  Taken 9/2/2021 0113 by Елена Rai, RN  Airway Safety Measures:   all equipment/monitors on and audible   suction at bedside   suction regulator   suction equipment   oxygen flowmeter   Patient continues on mitts; hands active, wake about 50% of the shift.  Patient responds verbally, slow and minimal speech; denied pain.  Patient had infrequent cough/congestions; tolerated suctioning.  Patient cooperative with cares; restraints released q 2 hours with cares.

## 2021-09-03 ENCOUNTER — APPOINTMENT (OUTPATIENT)
Dept: SPEECH THERAPY | Facility: CLINIC | Age: 71
DRG: 207 | End: 2021-09-03
Attending: HOSPITALIST
Payer: COMMERCIAL

## 2021-09-03 ENCOUNTER — APPOINTMENT (OUTPATIENT)
Dept: PHYSICAL THERAPY | Facility: CLINIC | Age: 71
DRG: 207 | End: 2021-09-03
Attending: HOSPITALIST
Payer: COMMERCIAL

## 2021-09-03 ENCOUNTER — APPOINTMENT (OUTPATIENT)
Dept: OCCUPATIONAL THERAPY | Facility: CLINIC | Age: 71
DRG: 207 | End: 2021-09-03
Attending: HOSPITALIST
Payer: COMMERCIAL

## 2021-09-03 LAB
ALBUMIN SERPL-MCNC: 3.1 G/DL (ref 3.5–5)
ANION GAP SERPL CALCULATED.3IONS-SCNC: 10 MMOL/L (ref 5–18)
BUN SERPL-MCNC: 41 MG/DL (ref 8–22)
CALCIUM SERPL-MCNC: 11.1 MG/DL (ref 8.5–10.5)
CHLORIDE BLD-SCNC: 102 MMOL/L (ref 98–107)
CO2 SERPL-SCNC: 32 MMOL/L (ref 22–31)
CREAT SERPL-MCNC: 0.88 MG/DL (ref 0.7–1.3)
GFR SERPL CREATININE-BSD FRML MDRD: 88 ML/MIN/1.73M2
GLUCOSE BLD-MCNC: 117 MG/DL (ref 70–125)
PHOSPHATE SERPL-MCNC: 3.6 MG/DL (ref 2.5–4.5)
POTASSIUM BLD-SCNC: 4.6 MMOL/L (ref 3.5–5)
SARS-COV-2 RNA RESP QL NAA+PROBE: NEGATIVE
SODIUM SERPL-SCNC: 144 MMOL/L (ref 136–145)

## 2021-09-03 PROCEDURE — 94640 AIRWAY INHALATION TREATMENT: CPT | Mod: 76

## 2021-09-03 PROCEDURE — 250N000013 HC RX MED GY IP 250 OP 250 PS 637: Performed by: HOSPITALIST

## 2021-09-03 PROCEDURE — 99232 SBSQ HOSP IP/OBS MODERATE 35: CPT | Performed by: INTERNAL MEDICINE

## 2021-09-03 PROCEDURE — 97110 THERAPEUTIC EXERCISES: CPT | Mod: GP | Performed by: PHYSICAL THERAPIST

## 2021-09-03 PROCEDURE — 87635 SARS-COV-2 COVID-19 AMP PRB: CPT | Performed by: INTERNAL MEDICINE

## 2021-09-03 PROCEDURE — 120N000001 HC R&B MED SURG/OB

## 2021-09-03 PROCEDURE — 250N000011 HC RX IP 250 OP 636

## 2021-09-03 PROCEDURE — 999N000009 HC STATISTIC AIRWAY CARE

## 2021-09-03 PROCEDURE — 94668 MNPJ CHEST WALL SBSQ: CPT

## 2021-09-03 PROCEDURE — 97530 THERAPEUTIC ACTIVITIES: CPT | Mod: GO

## 2021-09-03 PROCEDURE — 97112 NEUROMUSCULAR REEDUCATION: CPT | Mod: GP | Performed by: PHYSICAL THERAPIST

## 2021-09-03 PROCEDURE — 999N000157 HC STATISTIC RCP TIME EA 10 MIN

## 2021-09-03 PROCEDURE — 250N000009 HC RX 250: Performed by: INTERNAL MEDICINE

## 2021-09-03 PROCEDURE — 99233 SBSQ HOSP IP/OBS HIGH 50: CPT | Performed by: NURSE PRACTITIONER

## 2021-09-03 PROCEDURE — 94640 AIRWAY INHALATION TREATMENT: CPT

## 2021-09-03 PROCEDURE — 36415 COLL VENOUS BLD VENIPUNCTURE: CPT | Performed by: NURSE PRACTITIONER

## 2021-09-03 PROCEDURE — 92507 TX SP LANG VOICE COMM INDIV: CPT | Mod: GN

## 2021-09-03 PROCEDURE — 250N000013 HC RX MED GY IP 250 OP 250 PS 637

## 2021-09-03 PROCEDURE — 250N000013 HC RX MED GY IP 250 OP 250 PS 637: Performed by: INTERNAL MEDICINE

## 2021-09-03 PROCEDURE — 94660 CPAP INITIATION&MGMT: CPT

## 2021-09-03 PROCEDURE — 92526 ORAL FUNCTION THERAPY: CPT | Mod: GN

## 2021-09-03 PROCEDURE — 80069 RENAL FUNCTION PANEL: CPT | Performed by: NURSE PRACTITIONER

## 2021-09-03 PROCEDURE — 272N000098

## 2021-09-03 PROCEDURE — 250N000013 HC RX MED GY IP 250 OP 250 PS 637: Performed by: NURSE PRACTITIONER

## 2021-09-03 PROCEDURE — 94669 MECHANICAL CHEST WALL OSCILL: CPT

## 2021-09-03 RX ADMIN — VENLAFAXINE 50 MG: 25 TABLET ORAL at 17:36

## 2021-09-03 RX ADMIN — ANORECTAL OINTMENT: 15.7; .44; 24; 20.6 OINTMENT TOPICAL at 08:48

## 2021-09-03 RX ADMIN — HEPARIN SODIUM 5000 UNITS: 5000 INJECTION, SOLUTION INTRAVENOUS; SUBCUTANEOUS at 13:28

## 2021-09-03 RX ADMIN — SCOPALAMINE 1 PATCH: 1 PATCH, EXTENDED RELEASE TRANSDERMAL at 13:28

## 2021-09-03 RX ADMIN — ACETYLCYSTEINE 2 ML: 200 SOLUTION ORAL; RESPIRATORY (INHALATION) at 07:54

## 2021-09-03 RX ADMIN — IPRATROPIUM BROMIDE AND ALBUTEROL SULFATE 3 ML: 2.5; .5 SOLUTION RESPIRATORY (INHALATION) at 07:54

## 2021-09-03 RX ADMIN — ARMODAFINIL 150 MG: 150 TABLET ORAL at 06:01

## 2021-09-03 RX ADMIN — HEPARIN SODIUM 5000 UNITS: 5000 INJECTION, SOLUTION INTRAVENOUS; SUBCUTANEOUS at 06:01

## 2021-09-03 RX ADMIN — MIRTAZAPINE 7.5 MG: 7.5 TABLET ORAL at 22:07

## 2021-09-03 RX ADMIN — ANORECTAL OINTMENT: 15.7; .44; 24; 20.6 OINTMENT TOPICAL at 22:07

## 2021-09-03 RX ADMIN — IPRATROPIUM BROMIDE AND ALBUTEROL SULFATE 3 ML: 2.5; .5 SOLUTION RESPIRATORY (INHALATION) at 19:17

## 2021-09-03 RX ADMIN — HEPARIN SODIUM 5000 UNITS: 5000 INJECTION, SOLUTION INTRAVENOUS; SUBCUTANEOUS at 22:07

## 2021-09-03 RX ADMIN — LISINOPRIL 40 MG: 20 TABLET ORAL at 08:48

## 2021-09-03 RX ADMIN — ANORECTAL OINTMENT: 15.7; .44; 24; 20.6 OINTMENT TOPICAL at 13:28

## 2021-09-03 RX ADMIN — VENLAFAXINE 50 MG: 25 TABLET ORAL at 11:58

## 2021-09-03 RX ADMIN — FAMOTIDINE 20 MG: 20 TABLET, FILM COATED ORAL at 08:48

## 2021-09-03 RX ADMIN — FAMOTIDINE 20 MG: 20 TABLET, FILM COATED ORAL at 22:07

## 2021-09-03 RX ADMIN — VENLAFAXINE 50 MG: 25 TABLET ORAL at 08:48

## 2021-09-03 RX ADMIN — ACETYLCYSTEINE 2 ML: 200 SOLUTION ORAL; RESPIRATORY (INHALATION) at 19:17

## 2021-09-03 ASSESSMENT — MIFFLIN-ST. JEOR: SCORE: 1493.58

## 2021-09-03 NOTE — PLAN OF CARE
Goal: Tracheostomy will be managed safely  Outcome: Progressing  Goal: Respiratory status - ventilation  Description: Movement of air in and out of the lungs.    Liberate from ventilator  Outcome: Progressing   RT PROGRESS NOTE   [id number]  DATA:     CURRENT SETTINGS:             TRACH TYPE / SIZE: #6 Bivona, changed on 8/29             MODE: Cap RA             FIO2:        ACTION:             THERAPIES:   Duoneb/MucomystBID,Vest (changed from Metaneb today) BID             SUCTION:                           FREQUENCY:   X4 for lg p-yellow thick                         AMOUNT:                           CONSISTENCY:                           COLOR:                SPONTANEOUS COUGH EFFORT/STRENGTH OF EFFORT (not elicited by suctioning): Yes                              WEANING PHASE: 3                        WEAN MODE: Cap RA cont since 08/31  TM trials since 6/20                   WEAN TIME:                           END WEAN REASON:       RESPONSE:             BS: coarse to clr               VITAL SIGNS:                EMOTIONAL NEEDS / CONCERNS:                  RISK FOR SELF DECANNULATION:                          RISK DUE TO:                          INTERVENTION/S IN PLACE IS/ARE:         NOTE / PLAN:     Cont to monitor closely.

## 2021-09-03 NOTE — PLAN OF CARE
Problem: Anxiety  Goal: Anxiety Reduction or Resolution  Outcome: Improving     Problem: Restraint for Non-Violent/Non-Self-Destructive Behavior  Goal: Prevent/Manage Potential Problems  Description: Maintain safety of patient and others during period of restraint.  Promote psychological and physical wellbeing.  Prevent injury to skin and involved body parts.  Promote nutrition, hydration, and elimination.  9/2/2021 2258 by Yeimi Bradford RN  Outcome: Improving  9/2/2021 2256 by Yeimi Bradford, RN  Outcome: Improving     Problem: Skin and Tissue Injury (Artificial Airway)  Goal: Absence of Device-Related Skin or Tissue Injury  Outcome: Improving   Vss, denies pain. Uneventful shift. Continue restraints to protect tubes.

## 2021-09-03 NOTE — PLAN OF CARE
Problem: Anxiety  Goal: Anxiety Reduction or Resolution  Outcome: Improving     Problem: Restraint for Non-Violent/Non-Self-Destructive Behavior  Goal: Prevent/Manage Potential Problems  Description: Maintain safety of patient and others during period of restraint.  Promote psychological and physical wellbeing.  Prevent injury to skin and involved body parts.  Promote nutrition, hydration, and elimination.  Outcome: Improving   Pt calm , continue on restraints to protect tubes.no prn given, denies pain.vss.

## 2021-09-03 NOTE — PLAN OF CARE
Problem: Communication Impairment (Artificial Airway)  Goal: Effective Communication  7/28/2021 0012 by Hemalatha Kolb, RT  Outcome: Improving  7/28/2021 0009 by Hemalatha Kolb, RT  Outcome: Improving     Problem: Device-Related Complication Risk (Artificial Airway)  Goal: Optimal Device Function  Outcome: Improving     Problem: Skin and Tissue Injury (Artificial Airway)  Goal: Absence of Device-Related Skin or Tissue Injury  Outcome: Improving   RT PROGRESS NOTE     DATA:     CURRENT SETTINGS:             TRACH TYPE / SIZE:  6 Bivona 8/29             MODE:   Cap             FIO2:   RA     ACTION:             THERAPIES:   [place]-neb/mucomyst bid/duo-neb TID             SUCTION:                           FREQUENCY: X1                        AMOUNT:   large                          CONSISTENCY:  thick/thin                        COLOR:   white              SPONTANEOUS COUGH EFFORT/STRENGTH OF EFFORT (not elicited by suctioning): yes/strong                              WEANING PHASE:   3                        WEAN MODE:   cap                         WEAN TIME:   as tolerated                         END WEAN REASON:  on going      RESPONSE:             BS: Diminished               VITAL SIGNS:   Sat 91-95%, HR 69-86, RR 16-22             EMOTIONAL NEEDS / CONCERNS:                  RISK FOR SELF DECANNULATION: yes                        RISK DUE TO:  Confusion                        INTERVENTION/S IN PLACE IS/ARE:  bilateral mint in place     NOTE / PLAN:   Cont with plan.

## 2021-09-03 NOTE — PLAN OF CARE
Problem: Restraint for Non-Violent/Non-Self-Destructive Behavior  Goal: Prevent/Manage Potential Problems  Description: Maintain safety of patient and others during period of restraint.  Promote psychological and physical wellbeing.  Prevent injury to skin and involved body parts.  Promote nutrition, hydration, and elimination.  Outcome: No Change   Patient vss, continued on mitt  x 2. All scheduled medication given. Patient denied pain, No sign of  respiratory distress noted.

## 2021-09-03 NOTE — PLAN OF CARE
Pt. was up in chair til 1200. Participated in all therapy. Had small BM in the AM.   Wife requests that he is in the chair once during evening shift.   Condom cath was removed due to skin irritation. Pt. Has brief only.   Pt. denied pain throughout shift.    Problem: Restraint for Non-Violent/Non-Self-Destructive Behavior  Goal: Prevent/Manage Potential Problems  Description: Maintain safety of patient and others during period of restraint.  Promote psychological and physical wellbeing.  Prevent injury to skin and involved body parts.  Promote nutrition, hydration, and elimination.  Outcome: No Change     Problem: Skin and Tissue Injury (Artificial Airway)  Goal: Absence of Device-Related Skin or Tissue Injury  Outcome: No Change     Problem: Adult Inpatient Plan of Care  Goal: Optimal Comfort and Wellbeing  Outcome: Improving  Intervention: Provide Person-Centered Care  Recent Flowsheet Documentation  Taken 9/3/2021 0739 by Li Tony RN  Trust Relationship/Rapport: care explained     Problem: Anxiety  Goal: Anxiety Reduction or Resolution  Outcome: Improving  Intervention: Promote Anxiety Reduction  Recent Flowsheet Documentation  Taken 9/3/2021 0739 by Li Tony, RN  Family/Support System Care: involvement promoted

## 2021-09-03 NOTE — PROGRESS NOTES
[facility]    Medicine Progress Note - Hospitalist Service       Date of Admission:  6/11/2021    Summary:  Dayron Neri is a 70 year male with h/o HTN who presented to ED on 5/15/2021 with acute SAH after a fall with unresponsiveness.  He underwent emergent left external ventricular drain placement.  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  On 5/28/2021, patient had persistent cerebral vasospasm, CT head showed bilateral BAYLEE infarct and was treated with milrinone and verapamil.  On 6/1/2021, angiogram showed no evidence of vasospasm.  Extubated on 6/3/2021.  6/6/2021 he was reintubated due to hypoxia.  On 6/7/2021, patient underwent tracheostomy and PEG tube placement.  EVD removed 6/9/2021.   Patient was treated for possible pneumonia with Unasyn on 5/20/2021, It was switched to ceftriaxone the next day for sputum culture growing Klebsiella. On 5/23, sputum culture also grew Pseudomonas. Antibiotic was switched to Zosyn.  Due to increased WBCs in CSF, possibility of ventriculitis was raised. CSF culture was negative. Antibiotic was switched to cefepime and vancomycin on 5/28/2021 for 2 weeks.  Vanco was discontinued on 6/9/2021. On 6/5/2021, sputum grew ESBL so cefepime was switched to meropenem. [facility].     Since admission to [facility], patient did not make significant progression for his neurology status. Trach weaning is also lack of progression due to his cognitive impairment.       Assessment & Plan           Acute on chronic hypoxemic respiratory failure: S/p treatment pseudomonas ESBL and Klebsiella pneumonia. Has excessive secretions. Weaning has been slow. Per pulmonary, his cognition prevents him from improving further. His pulmonary status could improve more if his cognition improves.   - Has been capped on room air for day and night since 8/31. Per pulmonary, no plan for  "decannulation due to excessive secretions.   - Continue to wean per RT and pulmonary.   - Airway secretion: Glycopyrrolate was tried and stopped. 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 Last CT head done on 7/1/21, which shows resolution of previous multicompartment hemorrhage in the right frontal lobe and the ventricles.   - Need follow up with Dr. Martinez with Neurosurgery in 3 months with repeat CTA of head and neck for post-op f/u. Next CTA of head and neck mid September.     Acute metabolic encephalopathy/cognitive impairment: His current neurology status is likely his new baseline due to frontal lobe injury.  Wife is still hopeful he will improve further but it is unclear if he has the capacity to focus and learn what they are attempting to teach him during therapy.  - Continue Effexor and nuvigil     Dysphagia: On tube feeds and NPO.   - Speech therapy following. Per note from 8/17/21, despite several weeks of working with patient there has been \"no functional improvement in patient's ability to manage his oropharyngeal secretions.\" Speech discussed this with wife. Intensive dysphagia treatment has been discontinued.     Severe malnutrition: has moderate/severe subcutaneous fat loss, moderate/severe muscle loss  - continuous tube feeding per dietician     Chronic kidney disease (CKD) stage 3a: Creatinine has been stable.    Anemia: hemoglobin stable. Continue to monitor.    Essential hypertension: BP controlled. 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 Sputum culture 6/19 showed MDR pseudomonas. On Tobramycin nebs 6/24/2021 - 7/9/2021.   - Continue pulmonary toilet, " scheduled duonebs    Ventilator associated pneumonia: Completed antibiotics on July 18.    Severe debility, weakness, critical illness, deconditioning: Continue PT/OT    Hypercalcemia: unclear etiology, mild at this point. Parathyroid hormone normal. May be related to relative immobilization. Continue to monitor.    Insomnia: Continue nighttime mirtazapine 7.5 mg scheduled.       Diet: Adult Formula Drip Feeding: Continuous [place] Peptide 1.5; Gastrostomy; Goal Rate: 90; mL/hr; Medication - Feeding Tube Flush Frequency: At least 15-30 mL water before and after medication administration and with tube clogging; Amount to Send ...    DVT Prophylaxis: Heparin SQ  Gutierrez Catheter: Not present  Central Lines: None  Code Status: Full Code      Disposition Plan   Expected discharge: to be determined    The patient's care was discussed with the Bedside Nurse and Care Coordinator/.    Yazmin Hooper MD  Hospitalist Service  [facility] [employer]  Securely message with the Vocera Web Console (learn more here)  Text page via [employer] Paging/Directory      ______________________________________________________________________    Interval History   Patient remains stable today. Per nursing staff, patient seems to be a bit more interactive during physical therapy today.      Data reviewed today: I reviewed all medications, new labs and imaging results over the last 24 hours.     Physical Exam   Vital Signs: Temp: 98  F (36.7  C) Temp src: Oral BP: 113/64 Pulse: 99   Resp: 18 SpO2: 95 % O2 Device: None (Room air)    Weight: 154 lbs 11.2 oz    General appearance: not in acute distress  HEENT: PERRL, EOMI. Trach in place.  Lungs: Clear breath sounds in bilateral lung fields  Cardiovascular: Regular rate and rhythm, normal S1-S2  Abdomen: Soft, non tender, no distension  Musculoskeletal: No joint swelling  Skin: No rash and no edema  Neurology: Awake and alert. Mumble speech and hard to understand. Moves all four  extremities.      Data   Recent Labs   Lab 09/03/21  0640 09/02/21  0602 08/30/21  0634   WBC  --   --  7.1   HGB  --   --  10.5*   MCV  --   --  97   PLT  --   --  340    143 142   POTASSIUM 4.6 4.7 4.9   CHLORIDE 102 103 101   CO2 32* 31 31   BUN 41* 42* 34*   CR 0.88 0.82 0.90   ANIONGAP 10 9 10   RAGINI 11.1* 10.9* 11.3*    118 113   ALBUMIN 3.1* 3.1* 2.9*       Restraint:    Patient pulls his IV line and trach.     Within an hour after restraint an in person face to face assessment was completed, including an evaluation of the patient's immediate reaction to the intervention, behavioral assessment and review/assessment of history, drugs and medications, recent labs, etc., and behavioral condition.  The patient experienced: No adverse physical outcome from seclusion/restraint initiation.  The intervention of restraint or seclusion needs to continue.

## 2021-09-03 NOTE — PROGRESS NOTES
RENAL PROGRESS NOTE    HPI: 70 year old male with past medical history of hypertension, admitted with subarachnoid hemorrhage after a fall on 5/15/2021 s/p emergent left external ventricular drain placement with posterior communicating artery aneurysm defied structured underwent craniotomy with clipping of aneurysm , complicated by persistent cerebral vasospasms and bilateral BAYLEE infarct.  Course also complicated by respiratory failure with intubation pneumonia was treated with cefepime and Vanco, transferred to LTAC on 6/11/2021.  Nephrology consulted for hypercalcemia on 8/15/2021     Hypercalcemia: moderate and improved, d/t relative immobility with prolonged hospitalization, and CKD/reduced clearance  Corrected Ca 11-12's   PTH 26 on 8/11/2021, vitamin D level 30 8/15/2021  No history suggestive of any lymphoma or lymphadenopathy or any granulomatosis related disorders to suggest any additional etiology.  PLAN:  Continue to hold all Ca/D suppl  Free water 200cc q 4  Lower Ca TF  Trend BIW  No changes today     Volume:  Appears euvolemic despite net +25L by doc, incont    CKD 3:  With baseline crea 0.9, 24 hour cr clearance estimates GFR at 45     Hypertension BP stable on single agent lisinopril 40 mg     Anemia:  hgb trending 10-11 and stable, likely Inflammatory primary   Mild management per hospitalist medicine     Acute on chronic hypoxemic respiratory failure: trach capped, weaning, keep a bit on the dry side to promote resp status,   S/p tx ESBL Pseudomonas and Klebsiella pneumonia  Pulmonary following  Currently off antibiotics     Malnutrition dysphagia after CVA, now on tube feed, adjusted for lower calcium      SAH w fall sp craniotomy and hematoma evacuation , PCA aneurysm clip  cb BAYLEE stroke ad vasopasm  Now on rehab, speech and OT/PT therapyies     Insomnia/mood disorders  On Ritalin and mirtazapine  Management per primary medicine     Metabolic encephalopathy stable    SUBJECTIVE: pt is  non-verbal, discussed with wife on ipad today, she is hopeful to get him up and walking at some point.  She is asking about CKD, advised ~ 50% renal function based on 24hr urine studies.  Stable.      OBJECTIVE:  Physical Exam   Temp: 98.2  F (36.8  C) Temp src: Axillary BP: 139/84 Pulse: 88   Resp: 19 SpO2: 95 % O2 Device: None (Room air)    Vitals:    08/30/21 0400 09/01/21 0600 09/03/21 0420   Weight: 67.9 kg (149 lb 11.2 oz) 67.9 kg (149 lb 9.6 oz) 70.2 kg (154 lb 11.2 oz)     Vital Signs with Ranges  Temp:  [97.8  F (36.6  C)-98.8  F (37.1  C)] 98.2  F (36.8  C)  Pulse:  [69-96] 88  Resp:  [16-22] 19  BP: (123-139)/(84) 139/84  FiO2 (%):  [21 %] 21 %  SpO2:  [91 %-97 %] 95 %  I/O last 3 completed shifts:  In: 2082 [NG/GT:2082]  Out: 1650 [Urine:1650]      Patient Vitals for the past 72 hrs:   Weight   09/03/21 0420 70.2 kg (154 lb 11.2 oz)   09/01/21 0600 67.9 kg (149 lb 9.6 oz)       Intake/Output Summary (Last 24 hours) at 8/30/2021 1049  Last data filed at 8/30/2021 0519  Gross per 24 hour   Intake 3693 ml   Output 400 ml   Net 3293 ml       PHYSICAL EXAM:  General - Alert, uncertain if oriented, he is non-verbal, sitting up in WC bedside, wife on ipad, dry oral mucous membranes  Cardiovascular - Regular rate and rhythm   Respiratory -trach capped, on RA, lungs CTA by ant exam only d/t positioning  Extremities - No lower extremity edema bilaterally  Neuro:  Exam limited by speech therapy and up in wheelchair, non-verbal, not moving exgtremities for me  GI:  TF infusion  MSK:  Grossly intact, but global debit.   Psych:  flat affect  Net +, wt stable. 149-150 range      LABORATORY STUDIES:     Recent Labs   Lab 08/30/21  0634   WBC 7.1   RBC 3.30*   HGB 10.5*   HCT 32.0*          Basic Metabolic Panel:  Recent Labs   Lab 09/03/21  0640 09/02/21  0602 08/30/21  0634    143 142   POTASSIUM 4.6 4.7 4.9   CHLORIDE 102 103 101   CO2 32* 31 31   BUN 41* 42* 34*   CR 0.88 0.82 0.90    118 113    RAGINI 11.1* 10.9* 11.3*       INRNo lab results found in last 7 days.     Recent Labs   Lab Test 08/30/21  0634 08/23/21  0653 05/16/21  0405 05/15/21  2125 05/15/21  1222   INR  --   --   --  1.20* 1.16*   WBC 7.1 8.1   < > 12.3* 16.5*   HGB 10.5* 10.8*   < > 11.8* 13.6    377   < > 239 257    < > = values in this interval not displayed.       Personally reviewed current labs     ~ 25 minutes spent in exam, POC, education regarding renal disease and management.  >90% time spent in education and counseling and/or discussion with patient's care team    GUILHERME Ramires-BC  [facility]  [phone]

## 2021-09-04 LAB
ALBUMIN SERPL-MCNC: 3.2 G/DL (ref 3.5–5)
ANION GAP SERPL CALCULATED.3IONS-SCNC: 8 MMOL/L (ref 5–18)
BUN SERPL-MCNC: 43 MG/DL (ref 8–22)
CALCIUM SERPL-MCNC: 11.1 MG/DL (ref 8.5–10.5)
CHLORIDE BLD-SCNC: 102 MMOL/L (ref 98–107)
CO2 SERPL-SCNC: 32 MMOL/L (ref 22–31)
CREAT SERPL-MCNC: 0.88 MG/DL (ref 0.7–1.3)
GFR SERPL CREATININE-BSD FRML MDRD: 88 ML/MIN/1.73M2
GLUCOSE BLD-MCNC: 114 MG/DL (ref 70–125)
PHOSPHATE SERPL-MCNC: 3.7 MG/DL (ref 2.5–4.5)
POTASSIUM BLD-SCNC: 4.9 MMOL/L (ref 3.5–5)
SODIUM SERPL-SCNC: 142 MMOL/L (ref 136–145)

## 2021-09-04 PROCEDURE — 250N000009 HC RX 250: Performed by: INTERNAL MEDICINE

## 2021-09-04 PROCEDURE — 250N000013 HC RX MED GY IP 250 OP 250 PS 637: Performed by: HOSPITALIST

## 2021-09-04 PROCEDURE — 36415 COLL VENOUS BLD VENIPUNCTURE: CPT | Performed by: NURSE PRACTITIONER

## 2021-09-04 PROCEDURE — 99232 SBSQ HOSP IP/OBS MODERATE 35: CPT | Performed by: INTERNAL MEDICINE

## 2021-09-04 PROCEDURE — 250N000013 HC RX MED GY IP 250 OP 250 PS 637: Performed by: INTERNAL MEDICINE

## 2021-09-04 PROCEDURE — 250N000013 HC RX MED GY IP 250 OP 250 PS 637: Performed by: NURSE PRACTITIONER

## 2021-09-04 PROCEDURE — 120N000001 HC R&B MED SURG/OB

## 2021-09-04 PROCEDURE — 999N000157 HC STATISTIC RCP TIME EA 10 MIN

## 2021-09-04 PROCEDURE — 999N000009 HC STATISTIC AIRWAY CARE

## 2021-09-04 PROCEDURE — 94640 AIRWAY INHALATION TREATMENT: CPT

## 2021-09-04 PROCEDURE — 250N000013 HC RX MED GY IP 250 OP 250 PS 637

## 2021-09-04 PROCEDURE — 250N000011 HC RX IP 250 OP 636

## 2021-09-04 PROCEDURE — 94668 MNPJ CHEST WALL SBSQ: CPT

## 2021-09-04 PROCEDURE — 94669 MECHANICAL CHEST WALL OSCILL: CPT

## 2021-09-04 PROCEDURE — 80069 RENAL FUNCTION PANEL: CPT | Performed by: NURSE PRACTITIONER

## 2021-09-04 PROCEDURE — 94640 AIRWAY INHALATION TREATMENT: CPT | Mod: 76

## 2021-09-04 PROCEDURE — 94660 CPAP INITIATION&MGMT: CPT

## 2021-09-04 RX ADMIN — ARMODAFINIL 150 MG: 150 TABLET ORAL at 05:13

## 2021-09-04 RX ADMIN — VENLAFAXINE 50 MG: 25 TABLET ORAL at 09:51

## 2021-09-04 RX ADMIN — ANORECTAL OINTMENT: 15.7; .44; 24; 20.6 OINTMENT TOPICAL at 13:51

## 2021-09-04 RX ADMIN — HEPARIN SODIUM 5000 UNITS: 5000 INJECTION, SOLUTION INTRAVENOUS; SUBCUTANEOUS at 21:40

## 2021-09-04 RX ADMIN — IPRATROPIUM BROMIDE AND ALBUTEROL SULFATE 3 ML: 2.5; .5 SOLUTION RESPIRATORY (INHALATION) at 08:05

## 2021-09-04 RX ADMIN — HEPARIN SODIUM 5000 UNITS: 5000 INJECTION, SOLUTION INTRAVENOUS; SUBCUTANEOUS at 13:51

## 2021-09-04 RX ADMIN — VENLAFAXINE 50 MG: 25 TABLET ORAL at 17:51

## 2021-09-04 RX ADMIN — MIRTAZAPINE 7.5 MG: 7.5 TABLET ORAL at 21:40

## 2021-09-04 RX ADMIN — LISINOPRIL 40 MG: 20 TABLET ORAL at 09:52

## 2021-09-04 RX ADMIN — ACETYLCYSTEINE 2 ML: 200 SOLUTION ORAL; RESPIRATORY (INHALATION) at 20:19

## 2021-09-04 RX ADMIN — FAMOTIDINE 20 MG: 20 TABLET, FILM COATED ORAL at 09:52

## 2021-09-04 RX ADMIN — FAMOTIDINE 20 MG: 20 TABLET, FILM COATED ORAL at 21:40

## 2021-09-04 RX ADMIN — IPRATROPIUM BROMIDE AND ALBUTEROL SULFATE 3 ML: 2.5; .5 SOLUTION RESPIRATORY (INHALATION) at 20:19

## 2021-09-04 RX ADMIN — HEPARIN SODIUM 5000 UNITS: 5000 INJECTION, SOLUTION INTRAVENOUS; SUBCUTANEOUS at 05:23

## 2021-09-04 RX ADMIN — ACETYLCYSTEINE 2 ML: 200 SOLUTION ORAL; RESPIRATORY (INHALATION) at 08:05

## 2021-09-04 RX ADMIN — ANORECTAL OINTMENT: 15.7; .44; 24; 20.6 OINTMENT TOPICAL at 09:52

## 2021-09-04 RX ADMIN — VENLAFAXINE 50 MG: 25 TABLET ORAL at 13:50

## 2021-09-04 RX ADMIN — ANORECTAL OINTMENT: 15.7; .44; 24; 20.6 OINTMENT TOPICAL at 21:41

## 2021-09-04 ASSESSMENT — MIFFLIN-ST. JEOR: SCORE: 1507.65

## 2021-09-04 NOTE — PLAN OF CARE
Problem: Anxiety  Goal: Anxiety Reduction or Resolution  Outcome: Improving     Problem: Restraint for Non-Violent/Non-Self-Destructive Behavior  Goal: Prevent/Manage Potential Problems  Description: Maintain safety of patient and others during period of restraint.  Promote psychological and physical wellbeing.  Prevent injury to skin and involved body parts.  Promote nutrition, hydration, and elimination.  Outcome: Improving   Patient with mitts on; noted hand rubbing over trach; was redirectable.  Patient able to communicate his needs; calm and cooperative with cares.  Patient slept for about 75% of the shift.

## 2021-09-04 NOTE — PLAN OF CARE
Problem: Adult Inpatient Plan of Care  Goal: Optimal Comfort and Wellbeing  Outcome: Improving   Patient was more alert and calm most part of the shift. Patient was cooperative with all cares and repositions. Patient denied pain.

## 2021-09-04 NOTE — PLAN OF CARE
Goal: Tracheostomy will be managed safely  Outcome: Progressing  Goal: Respiratory status - ventilation  Description: Movement of air in and out of the lungs.    Liberate from ventilator  Outcome: Progressing   RT PROGRESS NOTE     DATA:     CURRENT SETTINGS:             TRACH TYPE / SIZE: #6 Bivona, changed on 8/29             MODE: Cap RA             FIO2:        ACTION:             THERAPIES:   Duoneb/MucomystBID,Vest BID (started yesterday, on 09/03)             SUCTION:                           FREQUENCY:   X4 for lg p-yellow thick                         AMOUNT:                           CONSISTENCY:                           COLOR:                SPONTANEOUS COUGH EFFORT/STRENGTH OF EFFORT (not elicited by suctioning): Yes                              WEANING PHASE: 3                        WEAN MODE: Cap RA cont since 08/31  TM trials since 6/20                   WEAN TIME:                           END WEAN REASON:       RESPONSE:             BS: coarse to clr               VITAL SIGNS:                EMOTIONAL NEEDS / CONCERNS:                  RISK FOR SELF DECANNULATION:                          RISK DUE TO:                          INTERVENTION/S IN PLACE IS/ARE:         NOTE / PLAN:     Cont to monitor closely.

## 2021-09-04 NOTE — PLAN OF CARE
RT PROGRESS NOTE  [id number]  9/3/21     DATA:     CURRENT SETTINGS:             TRACH TYPE / SIZE:  6 Bivona placed 8/29/21             MODE:   Capped/RA                   ACTION:             THERAPIES:  BID Duo and Mucomyst nebs given. BID vest treatment given x 15 minutes during neb.             SUCTION:                           FREQUENCY:  X 2                        AMOUNT:   large                        CONSISTENCY:   thick                        COLOR:   white-pale yellow             SPONTANEOUS COUGH EFFORT/STRENGTH OF EFFORT (not elicited by suctioning): SPC                              WEANING PHASE:   3, since 8/31821     RESPONSE:             BS:   Clear and dim             VITAL SIGNS:   SATs 93-97, HR 85, RR 20             RISK FOR SELF DECANNULATION:  Yes             RISK DUE TO:  Confusion             INTERVENTION/S IN PLACE IS/ARE:  Bilateral mitts     NOTE / PLAN:   Continue with same.    Problem: Device-Related Complication Risk (Artificial Airway)  Goal: Optimal Device Function  Outcome: No Change     Problem: Skin and Tissue Injury (Artificial Airway)  Goal: Absence of Device-Related Skin or Tissue Injury  Outcome: No Change     Problem: Communication Impairment (Artificial Airway)  Goal: Effective Communication  Outcome: No Change

## 2021-09-05 LAB
ALBUMIN SERPL-MCNC: 3.1 G/DL (ref 3.5–5)
ANION GAP SERPL CALCULATED.3IONS-SCNC: 11 MMOL/L (ref 5–18)
BUN SERPL-MCNC: 44 MG/DL (ref 8–22)
CALCIUM SERPL-MCNC: 11 MG/DL (ref 8.5–10.5)
CHLORIDE BLD-SCNC: 102 MMOL/L (ref 98–107)
CO2 SERPL-SCNC: 29 MMOL/L (ref 22–31)
CREAT SERPL-MCNC: 0.89 MG/DL (ref 0.7–1.3)
GFR SERPL CREATININE-BSD FRML MDRD: 88 ML/MIN/1.73M2
GLUCOSE BLD-MCNC: 119 MG/DL (ref 70–125)
PHOSPHATE SERPL-MCNC: 3.6 MG/DL (ref 2.5–4.5)
POTASSIUM BLD-SCNC: 4.7 MMOL/L (ref 3.5–5)
SODIUM SERPL-SCNC: 142 MMOL/L (ref 136–145)

## 2021-09-05 PROCEDURE — 250N000013 HC RX MED GY IP 250 OP 250 PS 637: Performed by: HOSPITALIST

## 2021-09-05 PROCEDURE — 99232 SBSQ HOSP IP/OBS MODERATE 35: CPT | Performed by: INTERNAL MEDICINE

## 2021-09-05 PROCEDURE — 250N000013 HC RX MED GY IP 250 OP 250 PS 637: Performed by: INTERNAL MEDICINE

## 2021-09-05 PROCEDURE — 80069 RENAL FUNCTION PANEL: CPT | Performed by: NURSE PRACTITIONER

## 2021-09-05 PROCEDURE — 120N000001 HC R&B MED SURG/OB

## 2021-09-05 PROCEDURE — 999N000157 HC STATISTIC RCP TIME EA 10 MIN

## 2021-09-05 PROCEDURE — 94640 AIRWAY INHALATION TREATMENT: CPT | Mod: 76

## 2021-09-05 PROCEDURE — 250N000013 HC RX MED GY IP 250 OP 250 PS 637: Performed by: NURSE PRACTITIONER

## 2021-09-05 PROCEDURE — 250N000009 HC RX 250: Performed by: INTERNAL MEDICINE

## 2021-09-05 PROCEDURE — 999N000009 HC STATISTIC AIRWAY CARE

## 2021-09-05 PROCEDURE — 250N000011 HC RX IP 250 OP 636

## 2021-09-05 PROCEDURE — 250N000013 HC RX MED GY IP 250 OP 250 PS 637

## 2021-09-05 PROCEDURE — 94669 MECHANICAL CHEST WALL OSCILL: CPT

## 2021-09-05 PROCEDURE — 36415 COLL VENOUS BLD VENIPUNCTURE: CPT | Performed by: NURSE PRACTITIONER

## 2021-09-05 PROCEDURE — 94640 AIRWAY INHALATION TREATMENT: CPT

## 2021-09-05 RX ADMIN — LISINOPRIL 40 MG: 20 TABLET ORAL at 08:38

## 2021-09-05 RX ADMIN — VENLAFAXINE 50 MG: 25 TABLET ORAL at 17:31

## 2021-09-05 RX ADMIN — VENLAFAXINE 50 MG: 25 TABLET ORAL at 11:45

## 2021-09-05 RX ADMIN — VENLAFAXINE 50 MG: 25 TABLET ORAL at 08:38

## 2021-09-05 RX ADMIN — IPRATROPIUM BROMIDE AND ALBUTEROL SULFATE 3 ML: 2.5; .5 SOLUTION RESPIRATORY (INHALATION) at 20:26

## 2021-09-05 RX ADMIN — ACETYLCYSTEINE 2 ML: 200 SOLUTION ORAL; RESPIRATORY (INHALATION) at 20:26

## 2021-09-05 RX ADMIN — HEPARIN SODIUM 5000 UNITS: 5000 INJECTION, SOLUTION INTRAVENOUS; SUBCUTANEOUS at 15:04

## 2021-09-05 RX ADMIN — MIRTAZAPINE 7.5 MG: 7.5 TABLET ORAL at 20:37

## 2021-09-05 RX ADMIN — ANORECTAL OINTMENT: 15.7; .44; 24; 20.6 OINTMENT TOPICAL at 20:37

## 2021-09-05 RX ADMIN — ANORECTAL OINTMENT: 15.7; .44; 24; 20.6 OINTMENT TOPICAL at 08:38

## 2021-09-05 RX ADMIN — ANORECTAL OINTMENT: 15.7; .44; 24; 20.6 OINTMENT TOPICAL at 15:05

## 2021-09-05 RX ADMIN — HEPARIN SODIUM 5000 UNITS: 5000 INJECTION, SOLUTION INTRAVENOUS; SUBCUTANEOUS at 06:15

## 2021-09-05 RX ADMIN — ACETYLCYSTEINE 2 ML: 200 SOLUTION ORAL; RESPIRATORY (INHALATION) at 08:10

## 2021-09-05 RX ADMIN — FAMOTIDINE 20 MG: 20 TABLET, FILM COATED ORAL at 20:37

## 2021-09-05 RX ADMIN — IPRATROPIUM BROMIDE AND ALBUTEROL SULFATE 3 ML: 2.5; .5 SOLUTION RESPIRATORY (INHALATION) at 08:10

## 2021-09-05 RX ADMIN — FAMOTIDINE 20 MG: 20 TABLET, FILM COATED ORAL at 08:38

## 2021-09-05 RX ADMIN — ARMODAFINIL 150 MG: 150 TABLET ORAL at 06:10

## 2021-09-05 RX ADMIN — HEPARIN SODIUM 5000 UNITS: 5000 INJECTION, SOLUTION INTRAVENOUS; SUBCUTANEOUS at 21:14

## 2021-09-05 NOTE — PLAN OF CARE
Goal: Tracheostomy will be managed safely  Outcome: Progressing  Goal: Respiratory status - ventilation  Description: Movement of air in and out of the lungs.    Liberate from ventilator  Outcome: Progressing   RT PROGRESS NOTE   [id number]  DATA:     CURRENT SETTINGS:             TRACH TYPE / SIZE: #6 Bivona, changed on 8/29             MODE: Cap RA             FIO2:        ACTION:             THERAPIES:   Duoneb/MucomystBID,Vest  BID             SUCTION:                           FREQUENCY:   X4 for scant to copious p-yellow thick to normal secr.                        AMOUNT:                           CONSISTENCY:                           COLOR:                SPONTANEOUS COUGH EFFORT/STRENGTH OF EFFORT (not elicited by suctioning): Yes                              WEANING PHASE: 3                        WEAN MODE: Cap RA cont since 08/31  TM trials since 6/20                   WEAN TIME:                           END WEAN REASON:       RESPONSE:             BS: coarse to clr               VITAL SIGNS:   Cheyne-Hernandez breathing noted in a.m. with alternating of 5 breaths in 10 sec and 15 sec apnea             EMOTIONAL NEEDS / CONCERNS:                  RISK FOR SELF DECANNULATION:                          RISK DUE TO:                          INTERVENTION/S IN PLACE IS/ARE:         NOTE / PLAN:     Cheyne-Hernandez breathing noted in a.m. with alternating of 5 breaths in 10 sec and 15 sec apnea. RN notified.  Cont to monitor closely.

## 2021-09-05 NOTE — PROGRESS NOTES
[facility]    Medicine Progress Note - Hospitalist Service       Date of Admission:  6/11/2021    Summary:  Dayron Neri is a 70 year male with h/o HTN who presented to ED on 5/15/2021 with acute SAH after a fall with unresponsiveness.  He underwent emergent left external ventricular drain placement.  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  On 5/28/2021, patient had persistent cerebral vasospasm, CT head showed bilateral BAYLEE infarct and was treated with milrinone and verapamil.  On 6/1/2021, angiogram showed no evidence of vasospasm.  Extubated on 6/3/2021.  6/6/2021 he was reintubated due to hypoxia.  On 6/7/2021, patient underwent tracheostomy and PEG tube placement.  EVD removed 6/9/2021.   Patient was treated for possible pneumonia with Unasyn on 5/20/2021, It was switched to ceftriaxone the next day for sputum culture growing Klebsiella. On 5/23, sputum culture also grew Pseudomonas. Antibiotic was switched to Zosyn.  Due to increased WBCs in CSF, possibility of ventriculitis was raised. CSF culture was negative. Antibiotic was switched to cefepime and vancomycin on 5/28/2021 for 2 weeks.  Vanco was discontinued on 6/9/2021. On 6/5/2021, sputum grew ESBL so cefepime was switched to meropenem. He was transferred to [facility] on 6/11/21.     Since admission to [facility], patient did not make significant progression for his neurology status. Trach weaning is also lack of progression due to his cognitive impairment.       Assessment & Plan           Acute on chronic hypoxemic respiratory failure: S/p treatment pseudomonas ESBL and Klebsiella pneumonia. Has excessive secretions. Weaning has been slow. Per pulmonary, his cognition prevents him from improving further. His pulmonary status could improve more if his cognition improves.   - Has been capped on room air for day and night since 8/31. Per pulmonary, no plan for decannulation due to  "excessive secretions.   - Continue to wean per RT and pulmonary.   - Airway secretion: Glycopyrrolate was tried and stopped. 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 Last CT head done on 7/1/21, which shows resolution of previous multicompartment hemorrhage in the right frontal lobe and the ventricles.   - Need follow up with Dr. Martinez with Neurosurgery in 3 months with repeat CTA of head and neck for post-op f/u. Next CTA of head and neck mid September.     Acute metabolic encephalopathy/cognitive impairment: His current neurology status is likely his new baseline due to frontal lobe injury.  Wife is still hopeful he will improve further but it is unclear if he has the capacity to focus and learn what they are attempting to teach him during therapy.  - Continue Effexor and nuvigil     Dysphagia: On tube feeds and NPO.   - Speech therapy following. Per note from 8/17/21, despite several weeks of working with patient there has been \"no functional improvement in patient's ability to manage his oropharyngeal secretions.\" Speech discussed this with wife. Intensive dysphagia treatment has been discontinued.     Severe malnutrition: has moderate/severe subcutaneous fat loss, moderate/severe muscle loss  - continuous tube feeding per dietician     Chronic kidney disease (CKD) stage 3a: Creatinine has been stable.    Anemia: hemoglobin stable. Continue to monitor.    Essential hypertension: BP controlled. 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 Sputum culture 6/19 showed MDR pseudomonas. On Tobramycin nebs 6/24/2021 - 7/9/2021.   - Continue pulmonary toilet, scheduled " justine    Ventilator associated pneumonia: Completed antibiotics on July 18.    Severe debility, weakness, critical illness, deconditioning: Continue PT/OT    Hypercalcemia: unclear etiology, mild at this point. Parathyroid hormone normal. May be related to relative immobilization. Continue to monitor.    Insomnia: Continue nighttime mirtazapine 7.5 mg scheduled.    Left inguinal hernia: US on 6/22/21 shows large left inguinal hernia containing mesenteric fat and bowel, extending to the superior scrotum, no evidence of intratesticular mass. Currently has no acute issue and no intervention indicated.       Diet: Adult Formula Drip Feeding: Continuous [employer] Peptide 1.5; Gastrostomy; Goal Rate: 90; mL/hr; Medication - Feeding Tube Flush Frequency: At least 15-30 mL water before and after medication administration and with tube clogging; Amount to Send ...    DVT Prophylaxis: Heparin SQ  Gutierrez Catheter: Not present  Central Lines: None  Code Status: Full Code      Disposition Plan   Expected discharge: to be determined    The patient's care was discussed with the Bedside Nurse and Care Coordinator/.    Yazmin Hooper MD  Hospitalist Service  [facility]  Securely message with the Vocera Web Console (learn more here)  Text page via [employer] Paging/Directory      ______________________________________________________________________    Interval History   Patient appeared comfortable when seen and examined today. His wife and daughter came visit him today. Wife concerns that patient seems sleep more than how he was one month ago. She is still hoping that patient might get better some time in the future. We discussed again that the longer from the stroke, the less chance of having meaningful recovery.    Data reviewed today: I reviewed all medications, new labs and imaging results over the last 24 hours.     Physical Exam   Vital Signs: Temp: 98.5  F (36.9  C) Temp src: Axillary BP: 112/69 Pulse: 93   Resp: 12  SpO2: 96 % O2 Device: None (Room air)    Weight: 157 lbs 12.8 oz    General appearance: not in acute distress  HEENT: PERRL, EOMI. Trach in place.  Lungs: Clear breath sounds in bilateral lung fields  Cardiovascular: Regular rate and rhythm, normal S1-S2  Abdomen: Soft, non tender, no distension. Left scrotal hernia, no pain on palpation  Musculoskeletal: No joint swelling  Skin: No rash and no edema  Neurology: Awake and alert. Mumble speech and hard to understand. Moves all four extremities.      Data   Recent Labs   Lab 09/05/21  0648 09/04/21  0707 09/03/21  0640 08/30/21  0634   WBC  --   --   --  7.1   HGB  --   --   --  10.5*   MCV  --   --   --  97   PLT  --   --   --  340    142 144 142   POTASSIUM 4.7 4.9 4.6 4.9   CHLORIDE 102 102 102 101   CO2 29 32* 32* 31   BUN 44* 43* 41* 34*   CR 0.89 0.88 0.88 0.90   ANIONGAP 11 8 10 10   RAGINI 11.0* 11.1* 11.1* 11.3*    114 117 113   ALBUMIN 3.1* 3.2* 3.1* 2.9*       Restraint:    Patient pulls his IV line and trach.     Within an hour after restraint an in person face to face assessment was completed, including an evaluation of the patient's immediate reaction to the intervention, behavioral assessment and review/assessment of history, drugs and medications, recent labs, etc., and behavioral condition.  The patient experienced: No adverse physical outcome from seclusion/restraint initiation.  The intervention of restraint or seclusion needs to continue.

## 2021-09-05 NOTE — PLAN OF CARE
Problem: Adult Inpatient Plan of Care  Goal: Absence of Hospital-Acquired Illness or Injury  Outcome: Improving  Intervention: Identify and Manage Fall Risk  Recent Flowsheet Documentation  Taken 9/5/2021 0127 by Елена Rai RN  Safety Promotion/Fall Prevention:   bed alarm on   fall prevention program maintained   room door open   room near nurse's station   safety round/check completed  Intervention: Prevent Infection  Recent Flowsheet Documentation  Taken 9/5/2021 0127 by Елена Rai RN  Infection Prevention:   hand hygiene promoted   personal protective equipment utilized   rest/sleep promoted  Goal: Optimal Comfort and Wellbeing  Outcome: Improving  Intervention: Provide Person-Centered Care  Recent Flowsheet Documentation  Taken 9/5/2021 0051 by Елена Rai RN  Trust Relationship/Rapport: care explained     Problem: Anxiety  Goal: Anxiety Reduction or Resolution  Outcome: Improving     Problem: Restraint for Non-Violent/Non-Self-Destructive Behavior  Goal: Prevent/Manage Potential Problems  Description: Maintain safety of patient and others during period of restraint.  Promote psychological and physical wellbeing.  Prevent injury to skin and involved body parts.  Promote nutrition, hydration, and elimination.  Outcome: Improving   Patient denied pain; oxygen level intermittently dropped to 80s then goes back to normal level when encouraged to deep breathe and after suctioning.  Patient continues with mitts; hands active even when asleep.  Restraints released for cares.  Patient able to communicate some of his needs; slept of the shift.

## 2021-09-05 NOTE — PLAN OF CARE
Problem: Device-Related Complication Risk (Artificial Airway)  Goal: Optimal Device Function  Outcome: No Change   Respiratory Care Progress Note    Airway/oxygen:    Current settings: capped, room air  Trach type/size: #6 Bivona placed on 8/31/2021    Actions:    Medications/therapies: Duoneb BID, Mucomyst BID, chest vest BID  Overnight suctioning   Frequency: 4x   Amount: large-copious   Consistency: thick   Color: cloudy-white   Spontaneous effort: strong    Weaning phase: 3  Daytime plan: capped, room air  Nighttime plan: capped, room air    Response    Breath sounds: diminished/rhonchi  Vital signs: Temp: 97.9  F (36.6  C) Temp src: Oral BP: 128/84 Pulse: 96   Resp: 22 SpO2: 92 % O2 Device: None (Room air)    Emotional needs/concerns: none  Risk for self decannulation: self   Risk due to: confusion   Intervention(s) in place: mitts bilateral hands    Note/Plan: continue plan of care

## 2021-09-05 NOTE — PLAN OF CARE
Patient was up on the chair for more than 3 hrs and tolerated well. Denied any pain. Tube feeding on progress. Will continue to monitor.

## 2021-09-05 NOTE — PLAN OF CARE
Pt alert and awake family at his bedside, updated on patient status. Dr visited as well.All cares done and explained. Will continue to monitor and assess.

## 2021-09-06 ENCOUNTER — APPOINTMENT (OUTPATIENT)
Dept: SPEECH THERAPY | Facility: CLINIC | Age: 71
DRG: 207 | End: 2021-09-06
Attending: HOSPITALIST
Payer: COMMERCIAL

## 2021-09-06 LAB
ALBUMIN SERPL-MCNC: 3.3 G/DL (ref 3.5–5)
ANION GAP SERPL CALCULATED.3IONS-SCNC: 11 MMOL/L (ref 5–18)
ANION GAP SERPL CALCULATED.3IONS-SCNC: 12 MMOL/L (ref 5–18)
BASOPHILS # BLD AUTO: 0.1 10E3/UL (ref 0–0.2)
BASOPHILS NFR BLD AUTO: 1 %
BUN SERPL-MCNC: 49 MG/DL (ref 8–22)
BUN SERPL-MCNC: 51 MG/DL (ref 8–22)
CALCIUM SERPL-MCNC: 11.1 MG/DL (ref 8.5–10.5)
CALCIUM SERPL-MCNC: 11.7 MG/DL (ref 8.5–10.5)
CHLORIDE BLD-SCNC: 104 MMOL/L (ref 98–107)
CHLORIDE BLD-SCNC: 105 MMOL/L (ref 98–107)
CO2 SERPL-SCNC: 30 MMOL/L (ref 22–31)
CO2 SERPL-SCNC: 31 MMOL/L (ref 22–31)
CREAT SERPL-MCNC: 0.87 MG/DL (ref 0.7–1.3)
CREAT SERPL-MCNC: 0.98 MG/DL (ref 0.7–1.3)
EOSINOPHIL # BLD AUTO: 0.1 10E3/UL (ref 0–0.7)
EOSINOPHIL NFR BLD AUTO: 1 %
ERYTHROCYTE [DISTWIDTH] IN BLOOD BY AUTOMATED COUNT: 13.5 % (ref 10–15)
FERRITIN SERPL-MCNC: 119 NG/ML (ref 27–300)
GFR SERPL CREATININE-BSD FRML MDRD: 79 ML/MIN/1.73M2
GFR SERPL CREATININE-BSD FRML MDRD: 89 ML/MIN/1.73M2
GLUCOSE BLD-MCNC: 114 MG/DL (ref 70–125)
GLUCOSE BLD-MCNC: 123 MG/DL (ref 70–125)
HCT VFR BLD AUTO: 36.1 % (ref 40–53)
HGB BLD-MCNC: 11.5 G/DL (ref 13.3–17.7)
IMM GRANULOCYTES # BLD: 0 10E3/UL
IMM GRANULOCYTES NFR BLD: 0 %
IRON SATN MFR SERPL: 24 % (ref 20–50)
IRON SERPL-MCNC: 73 UG/DL (ref 42–175)
LYMPHOCYTES # BLD AUTO: 1.6 10E3/UL (ref 0.8–5.3)
LYMPHOCYTES NFR BLD AUTO: 17 %
MAGNESIUM SERPL-MCNC: 2.2 MG/DL (ref 1.8–2.6)
MCH RBC QN AUTO: 31.7 PG (ref 26.5–33)
MCHC RBC AUTO-ENTMCNC: 31.9 G/DL (ref 31.5–36.5)
MCV RBC AUTO: 99 FL (ref 78–100)
MONOCYTES # BLD AUTO: 0.7 10E3/UL (ref 0–1.3)
MONOCYTES NFR BLD AUTO: 7 %
NEUTROPHILS # BLD AUTO: 7.1 10E3/UL (ref 1.6–8.3)
NEUTROPHILS NFR BLD AUTO: 74 %
NRBC # BLD AUTO: 0 10E3/UL
NRBC BLD AUTO-RTO: 0 /100
PHOSPHATE SERPL-MCNC: 3.9 MG/DL (ref 2.5–4.5)
PLATELET # BLD AUTO: 314 10E3/UL (ref 150–450)
POTASSIUM BLD-SCNC: 4.7 MMOL/L (ref 3.5–5)
POTASSIUM BLD-SCNC: 5.4 MMOL/L (ref 3.5–5)
RBC # BLD AUTO: 3.63 10E6/UL (ref 4.4–5.9)
SODIUM SERPL-SCNC: 146 MMOL/L (ref 136–145)
SODIUM SERPL-SCNC: 147 MMOL/L (ref 136–145)
TIBC SERPL-MCNC: 310 UG/DL (ref 313–563)
TRANSFERRIN SERPL-MCNC: 248 MG/DL (ref 212–360)
WBC # BLD AUTO: 9.6 10E3/UL (ref 4–11)

## 2021-09-06 PROCEDURE — 94668 MNPJ CHEST WALL SBSQ: CPT

## 2021-09-06 PROCEDURE — 250N000009 HC RX 250: Performed by: INTERNAL MEDICINE

## 2021-09-06 PROCEDURE — 83970 ASSAY OF PARATHORMONE: CPT | Performed by: NURSE PRACTITIONER

## 2021-09-06 PROCEDURE — 94640 AIRWAY INHALATION TREATMENT: CPT | Mod: 76

## 2021-09-06 PROCEDURE — 82310 ASSAY OF CALCIUM: CPT | Performed by: INTERNAL MEDICINE

## 2021-09-06 PROCEDURE — 85025 COMPLETE CBC W/AUTO DIFF WBC: CPT | Performed by: NURSE PRACTITIONER

## 2021-09-06 PROCEDURE — 94669 MECHANICAL CHEST WALL OSCILL: CPT

## 2021-09-06 PROCEDURE — 258N000003 HC RX IP 258 OP 636: Performed by: INTERNAL MEDICINE

## 2021-09-06 PROCEDURE — 94640 AIRWAY INHALATION TREATMENT: CPT

## 2021-09-06 PROCEDURE — 36415 COLL VENOUS BLD VENIPUNCTURE: CPT | Performed by: NURSE PRACTITIONER

## 2021-09-06 PROCEDURE — 120N000001 HC R&B MED SURG/OB

## 2021-09-06 PROCEDURE — 250N000013 HC RX MED GY IP 250 OP 250 PS 637: Performed by: INTERNAL MEDICINE

## 2021-09-06 PROCEDURE — 80069 RENAL FUNCTION PANEL: CPT | Performed by: NURSE PRACTITIONER

## 2021-09-06 PROCEDURE — 82728 ASSAY OF FERRITIN: CPT | Performed by: NURSE PRACTITIONER

## 2021-09-06 PROCEDURE — 250N000013 HC RX MED GY IP 250 OP 250 PS 637: Performed by: HOSPITALIST

## 2021-09-06 PROCEDURE — 83735 ASSAY OF MAGNESIUM: CPT | Performed by: INTERNAL MEDICINE

## 2021-09-06 PROCEDURE — 999N000009 HC STATISTIC AIRWAY CARE

## 2021-09-06 PROCEDURE — 250N000013 HC RX MED GY IP 250 OP 250 PS 637: Performed by: NURSE PRACTITIONER

## 2021-09-06 PROCEDURE — 92507 TX SP LANG VOICE COMM INDIV: CPT | Mod: GN | Performed by: SPEECH-LANGUAGE PATHOLOGIST

## 2021-09-06 PROCEDURE — 99232 SBSQ HOSP IP/OBS MODERATE 35: CPT | Performed by: INTERNAL MEDICINE

## 2021-09-06 PROCEDURE — 99232 SBSQ HOSP IP/OBS MODERATE 35: CPT | Performed by: NURSE PRACTITIONER

## 2021-09-06 PROCEDURE — 250N000013 HC RX MED GY IP 250 OP 250 PS 637

## 2021-09-06 PROCEDURE — 250N000011 HC RX IP 250 OP 636

## 2021-09-06 PROCEDURE — 999N000157 HC STATISTIC RCP TIME EA 10 MIN

## 2021-09-06 PROCEDURE — 999N000123 HC STATISTIC OXYGEN O2DAILY TECH TIME

## 2021-09-06 PROCEDURE — 36415 COLL VENOUS BLD VENIPUNCTURE: CPT | Performed by: INTERNAL MEDICINE

## 2021-09-06 PROCEDURE — 84466 ASSAY OF TRANSFERRIN: CPT | Performed by: NURSE PRACTITIONER

## 2021-09-06 RX ORDER — SODIUM CHLORIDE 9 MG/ML
INJECTION, SOLUTION INTRAVENOUS CONTINUOUS
Status: ACTIVE | OUTPATIENT
Start: 2021-09-06 | End: 2021-09-07

## 2021-09-06 RX ORDER — AMLODIPINE BESYLATE 10 MG/1
10 TABLET ORAL DAILY
Status: DISCONTINUED | OUTPATIENT
Start: 2021-09-07 | End: 2021-09-13

## 2021-09-06 RX ADMIN — HEPARIN SODIUM 5000 UNITS: 5000 INJECTION, SOLUTION INTRAVENOUS; SUBCUTANEOUS at 05:32

## 2021-09-06 RX ADMIN — LISINOPRIL 40 MG: 20 TABLET ORAL at 08:00

## 2021-09-06 RX ADMIN — FAMOTIDINE 20 MG: 20 TABLET, FILM COATED ORAL at 21:18

## 2021-09-06 RX ADMIN — ACETYLCYSTEINE 2 ML: 200 SOLUTION ORAL; RESPIRATORY (INHALATION) at 07:27

## 2021-09-06 RX ADMIN — HEPARIN SODIUM 5000 UNITS: 5000 INJECTION, SOLUTION INTRAVENOUS; SUBCUTANEOUS at 21:18

## 2021-09-06 RX ADMIN — ACETYLCYSTEINE 2 ML: 200 SOLUTION ORAL; RESPIRATORY (INHALATION) at 21:04

## 2021-09-06 RX ADMIN — SCOPALAMINE 1 PATCH: 1 PATCH, EXTENDED RELEASE TRANSDERMAL at 14:42

## 2021-09-06 RX ADMIN — SODIUM CHLORIDE: 9 INJECTION, SOLUTION INTRAVENOUS at 12:00

## 2021-09-06 RX ADMIN — VENLAFAXINE 50 MG: 25 TABLET ORAL at 08:01

## 2021-09-06 RX ADMIN — HEPARIN SODIUM 5000 UNITS: 5000 INJECTION, SOLUTION INTRAVENOUS; SUBCUTANEOUS at 14:39

## 2021-09-06 RX ADMIN — VENLAFAXINE 50 MG: 25 TABLET ORAL at 13:08

## 2021-09-06 RX ADMIN — ANORECTAL OINTMENT: 15.7; .44; 24; 20.6 OINTMENT TOPICAL at 21:18

## 2021-09-06 RX ADMIN — ANORECTAL OINTMENT: 15.7; .44; 24; 20.6 OINTMENT TOPICAL at 14:39

## 2021-09-06 RX ADMIN — IPRATROPIUM BROMIDE AND ALBUTEROL SULFATE 3 ML: 2.5; .5 SOLUTION RESPIRATORY (INHALATION) at 21:04

## 2021-09-06 RX ADMIN — FAMOTIDINE 20 MG: 20 TABLET, FILM COATED ORAL at 08:01

## 2021-09-06 RX ADMIN — VENLAFAXINE 50 MG: 25 TABLET ORAL at 18:22

## 2021-09-06 RX ADMIN — ARMODAFINIL 150 MG: 150 TABLET ORAL at 05:33

## 2021-09-06 RX ADMIN — IPRATROPIUM BROMIDE AND ALBUTEROL SULFATE 3 ML: 2.5; .5 SOLUTION RESPIRATORY (INHALATION) at 07:26

## 2021-09-06 RX ADMIN — MIRTAZAPINE 7.5 MG: 7.5 TABLET ORAL at 21:19

## 2021-09-06 NOTE — PROGRESS NOTES
"Pulmonary Medicine Follow up Note - Ventilator Rounds:    Clinical status discussed today with RN and respiratory therapist.    Chief complaint: Ongoing respiratory failure  Significant change in clinical status during past 24 hours? No      Ventilation Mode: Other (see comments) (Cap RA)  Resp: 18    Tracheal secretions: x 4 last night  moderate to large    Current phase of ventilator weaning pathway:  Phase 3 since 8/31  Has copious amount of secretions      Physical exam     /80 (BP Location: Left arm)   Pulse 93   Temp 98.1  F (36.7  C) (Axillary)   Resp 18   Ht 1.803 m (5' 10.98\")   Wt 71.6 kg (157 lb 12.8 oz)   SpO2 95%   BMI 22.02 kg/m      Gen: tracks intermittently,  up in chair  HEENT: AT/NC. Trach in place. Capped up in chair  Wife doing oral suctioning with Yankauer   Lungs: diminished ant otherwise clear  CV: regular, no murmurs or gallops appreciated  Abdomen: soft, NT, BS wnl  Ext: no edema  Neuro: awake, tracking intermittently, smiles. Leans to his Right.          Diagnosis:     Patient is a 71 yo man with history of hypertension. Patient initially presented on 15-May-2021 to [facility] after being found unresponsive by his wife. Patient was then found to have a subarachnoid hemorrhage secondary to a ruptured aneurysm. Patient was intubated and was started on mannitol, IV antihypertensives and hyperventilation and patient was then transferred to [facility]. Patient underwent aneurysm clipping as well as placement of extra-ventricular drain from hydrocephalus on 15-May-2021. Extra-ventricular drain would malfunction and would require replacement on 04-Jun-2021 and 06-Jun-2021. Extra-ventricular drain reportedly was removed on 09-Jun-2021. Patient had intra-arterial vasospasm treatment with verapamil on 28-May-2021.  Patient was extubated on 16-May-2021 but had to be reintubated on 19-May-2021. Patient would be extubated on 03-Jun-2021 but would be " reintubated on 06-Jun-2021. Patient had tracheostomy and PEG-tube placed on 07-Jun-2021. Patient required treatment for hospital-acquired pneumonia. Patient was transferred to [facility] on 11-Jun-2021.    1. Acute respiratory failure s/p trach 6/7/2021  2. S/p treatment pseudomonas ESBL and klebsiella pneumonia   3. Encephalopathy   4. Subarachnoid bleed s/p craniotomy and clipping P-comm rupture aneurysm.   5. HTN  6. Malnourishment   7. Dysphagia s/p G tube    Recommendations/Plans (MDM):  1. Weaning orders: phase 3  Will not decannulate yet, copious amount of secretions per Rt Jennifer   2. Trach change? 8/29  3. Diagnostic testing? no  4. Continue pulmonary toiletting, scheduled ipratropium-albuterol, 3% saline, acetylcysteine, metaneb.    Wife and daughter in room with pt  Wife concerned about dispo plans, and state of their insurance   [facility] [place] service will leve request for LSW to talk with them 9/7     SISI Cross CNP   [facility] Pulm/CC

## 2021-09-06 NOTE — PLAN OF CARE
Goal: Tracheostomy will be managed safely  Outcome: Progressing  Goal: Respiratory status - ventilation  Description: Movement of air in and out of the lungs.    Liberate from ventilator  Outcome: Progressing   RT PROGRESS NOTE   [id number]  DATA:     CURRENT SETTINGS:             TRACH TYPE / SIZE: #6 Bivona, changed on 8/29             MODE: Cap RA             FIO2:        ACTION:             THERAPIES:   Duoneb/MucomystBID,Vest  BID             SUCTION:                           FREQUENCY:   X5-6 for scant to copious p-yellow thick to normal secr.                        AMOUNT:                           CONSISTENCY:                           COLOR:                SPONTANEOUS COUGH EFFORT/STRENGTH OF EFFORT (not elicited by suctioning): Yes                              WEANING PHASE: 3                        WEAN MODE: Cap RA cont since 08/31  TM trials since 6/20                   WEAN TIME:                           END WEAN REASON:       RESPONSE:             BS: coarse to clr               VITAL SIGNS:   occasional Cheyne-Hernandez breathing noted.             EMOTIONAL NEEDS / CONCERNS:                  RISK FOR SELF DECANNULATION:                          RISK DUE TO:                          INTERVENTION/S IN PLACE IS/ARE:         NOTE / PLAN:     Cont to monitor closely.

## 2021-09-06 NOTE — PLAN OF CARE
Problem: Restraint for Non-Violent/Non-Self-Destructive Behavior  Goal: Prevent/Manage Potential Problems  Description: Maintain safety of patient and others during period of restraint.  Promote psychological and physical wellbeing.  Prevent injury to skin and involved body parts.  Promote nutrition, hydration, and elimination.  Outcome: Improving     Pt appeared comfortable this shift.  No skin issues noted on restrained limbs.  Vitals were stable.

## 2021-09-06 NOTE — PLAN OF CARE
Problem: Adult Inpatient Plan of Care  Goal: Optimal Comfort and Wellbeing  Outcome: No Change   Patient does not manifest signs of pain; noted, however, to resist cares at times and showed signs of anxiety when turned or transferred. Up in the chair twice this shift.  Normal saline running at 75 ml/hr to correct  Hypernatremia. Latest Na+ level is 146 meq/L.

## 2021-09-06 NOTE — PROGRESS NOTES
[facility]    Medicine Progress Note - Hospitalist Service       Date of Admission:  6/11/2021    Summary:  Dayron Neri is a 70 year male with h/o HTN who presented to ED on 5/15/2021 with acute SAH after a fall with unresponsiveness.  He underwent emergent left external ventricular drain placement.  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  On 5/28/2021, patient had persistent cerebral vasospasm, CT head showed bilateral BAYLEE infarct and was treated with milrinone and verapamil.  On 6/1/2021, angiogram showed no evidence of vasospasm.  Extubated on 6/3/2021.  6/6/2021 he was reintubated due to hypoxia.  On 6/7/2021, patient underwent tracheostomy and PEG tube placement.  EVD removed 6/9/2021.   Patient was treated for possible pneumonia with Unasyn on 5/20/2021, It was switched to ceftriaxone the next day for sputum culture growing Klebsiella. On 5/23, sputum culture also grew Pseudomonas. Antibiotic was switched to Zosyn.  Due to increased WBCs in CSF, possibility of ventriculitis was raised. CSF culture was negative. Antibiotic was switched to cefepime and vancomycin on 5/28/2021 for 2 weeks.  Vanco was discontinued on 6/9/2021. On 6/5/2021, sputum grew ESBL so cefepime was switched to meropenem. He was transferred to [facility] on 6/11/21.     Since admission to [facility], patient did not make significant progression for his neurology status. Trach weaning is also lack of progression due to his cognitive impairment.       Assessment & Plan           Acute on chronic hypoxemic respiratory failure: S/p treatment pseudomonas ESBL and Klebsiella pneumonia. Has excessive secretions. Weaning has been slow. Per pulmonary, his cognition prevents him from improving further. His pulmonary status could improve more if his cognition improves.   - Has been capped on room air for day and night since 8/31. Per pulmonary, no plan for decannulation due to  "excessive secretions.   - Continue to wean per RT and pulmonary.   - Airway secretion: Glycopyrrolate was tried and stopped. 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 Last CT head done on 7/1/21, which shows resolution of previous multicompartment hemorrhage in the right frontal lobe and the ventricles.   - Need follow up with Dr. Martinez with Neurosurgery in 3 months with repeat CTA of head and neck for post-op f/u. Next CTA of head and neck mid September.     Acute metabolic encephalopathy/cognitive impairment: His current neurology status is likely his new baseline due to frontal lobe injury.  Wife is still hopeful he will improve further but it is unclear if he has the capacity to focus and learn what they are attempting to teach him during therapy.  - Nuvigil started on 8/27 per neurology recommendation  - Patient continues to pull his IV lines and trach, which requires bilateral mitten restraint. Per nursing staff, patient has hallucination. Psych consult.   - Continue Effexor     Dysphagia: On tube feeds and NPO.   - Speech therapy following. Per note from 8/17/21, despite several weeks of working with patient there has been \"no functional improvement in patient's ability to manage his oropharyngeal secretions.\" Speech discussed this with wife. Intensive dysphagia treatment has been discontinued.     Severe malnutrition: has moderate/severe subcutaneous fat loss, moderate/severe muscle loss  - continuous tube feeding per dietician     Chronic kidney disease (CKD) stage 3a: Creatinine has been stable.    Anemia: hemoglobin stable. Continue to monitor.    Essential hypertension: BP controlled. 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 Sputum culture 6/19 showed MDR pseudomonas. On Tobramycin nebs 6/24/2021 - 7/9/2021.   - Continue pulmonary toilet, scheduled duonebs    Ventilator associated pneumonia: Completed antibiotics on July 18.    Severe debility, weakness, critical illness, deconditioning: Continue PT/OT    Hypercalcemia: unclear etiology, mild at this point. Parathyroid hormone normal. May be related to relative immobilization. Continue to monitor.    Insomnia: Continue nighttime mirtazapine 7.5 mg scheduled.    Left inguinal hernia: US on 6/22/21 shows large left inguinal hernia containing mesenteric fat and bowel, extending to the superior scrotum, no evidence of intratesticular mass. Currently has no acute issue and no intervention indicated.       Diet: Adult Formula Drip Feeding: Continuous [employer] Peptide 1.5; Gastrostomy; Goal Rate: 90; mL/hr; Medication - Feeding Tube Flush Frequency: At least 15-30 mL water before and after medication administration and with tube clogging; Amount to Send ...    DVT Prophylaxis: Heparin SQ  Gutierrez Catheter: Not present  Central Lines: None  Code Status: Full Code      Disposition Plan   Expected discharge: to be determined    The patient's care was discussed with the Bedside Nurse and Care Coordinator/.    Yazmin Hooper MD  Hospitalist Service  [facility]  Securely message with the Vocera Web Console (learn more here)  Text page via [employer] Paging/Directory      ______________________________________________________________________    Interval History   Patient appeared comfortable when seen and examined today. His wife and daughter came visit him today. Per nursing staff, patient continues to pull his IV lines and trach and need bilateral mitten restraints. Wife is disappointed that patient has not recovered as well as she expects. She states that she still hopes that patient would get better in the future. She also has  financial concerns about patient's hospital stay.    Physical Exam   Vital Signs: Temp: 97.9  F (36.6  C) Temp src: Axillary BP: 128/76 Pulse: 86   Resp: 15 SpO2: 93 % O2 Device: None (Room air)    Weight: 157 lbs 12.8 oz    General appearance: not in acute distress  HEENT: PERRL, EOMI. Trach in place.  Lungs: Clear breath sounds in bilateral lung fields  Cardiovascular: Regular rate and rhythm, normal S1-S2  Abdomen: Soft, non tender, no distension. Left scrotal hernia, no pain on palpation  Musculoskeletal: No joint swelling  Skin: No rash and no edema  Neurology: Awake and alert. Mumble speech and hard to understand. Moves all four extremities.      Data   Recent Labs   Lab 09/06/21  0700 09/06/21  0659 09/05/21  0648 09/04/21  0707   WBC 9.6  --   --   --    HGB 11.5*  --   --   --    MCV 99  --   --   --      --   --   --    NA  --  147* 142 142   POTASSIUM  --  5.4* 4.7 4.9   CHLORIDE  --  105 102 102   CO2  --  31 29 32*   BUN  --  49* 44* 43*   CR  --  0.98 0.89 0.88   ANIONGAP  --  11 11 8   RAGINI  --  11.7* 11.0* 11.1*   GLC  --  123 119 114   ALBUMIN  --  3.3* 3.1* 3.2*       Restraint:    Patient pulls his IV line and trach.     Within an hour after restraint an in person face to face assessment was completed, including an evaluation of the patient's immediate reaction to the intervention, behavioral assessment and review/assessment of history, drugs and medications, recent labs, etc., and behavioral condition.  The patient experienced: No adverse physical outcome from seclusion/restraint initiation.  The intervention of restraint or seclusion needs to continue.

## 2021-09-06 NOTE — PROGRESS NOTES
RENAL PROGRESS NOTE    HPI: 70 year old male with past medical history of hypertension, admitted with subarachnoid hemorrhage after a fall on 5/15/2021 s/p emergent left external ventricular drain placement with posterior communicating artery aneurysm defied structured underwent craniotomy with clipping of aneurysm , complicated by persistent cerebral vasospasms and bilateral BAYLEE infarct.  Course also complicated by respiratory failure with intubation pneumonia was treated with cefepime and Vanco, transferred to LTAC on 6/11/2021.  Nephrology consulted for hypercalcemia on 8/15/2021     Hypercalcemia: moderate and stable d/t relative immobility with prolonged hospitalization, and CKD/reduced clearance  Corrected Ca 11-12's   PTH 26 on 8/11/2021, vitamin D level 30 8/15/2021  No history suggestive of any lymphoma or lymphadenopathy or any granulomatosis related disorders to suggest any additional etiology.  PLAN:  Avoid  Ca/D suppl  Cont Free water 200cc q 4  Lower Ca TF  Trend BIW     HyperK:  Sl hyperK 5.4 (high normal bicarb and normal glucose), ask RD for low K TF, hold off on diuretics with evidence of some intravasc contraction, swap ACE for CCB and trend.     Volume:  Appears euvolemic despite net +25L by doc, incont early on, now condom cath and net neutral to sl pos last few days.  Holding off on diuretics with higher Na.     CKD 3:  With baseline crea 0.9, 24 hour cr clearance estimates GFR at 45     Hypertension BP stable on single agent lisinopril 40 mg, swap out to CCB, with K trending up, monitor     Anemia:  hgb trending 10-11s and stable, likely Inflammatory and malnutrition primary , Mild management per hospitalist medicine, ck iron studies and replace if indicated, no obvious bleed      Acute on chronic hypoxemic respiratory failure: trach capped, weaning phase 3 but trach remains d/t copious secretions,  keeping a bit on the dry side to promote resp status,   S/p tx ESBL Pseudomonas and  Klebsiella pneumonia  Pulmonary following  Currently off antibiotics     Malnutrition dysphagia after CVA, now on tube feeds, adjusted for lower calcium , look into low K options      SAH w fall sp craniotomy and hematoma evacuation , PCA aneurysm clip  cb BAYLEE stroke ad vasopasm  Now on rehab, speech and OT/PT therapyies     Insomnia/mood disorders  On Ritalin and mirtazapine  Management per primary medicine     Metabolic encephalopathy confused     SUBJECTIVE: pt is non-verbal, discussed with RN and RT, on phase 3 wean       OBJECTIVE:  Physical Exam   Temp: 98.1  F (36.7  C) Temp src: Axillary BP: 131/80 Pulse: 104   Resp: 16 SpO2: 93 % O2 Device: None (Room air)    Vitals:    09/01/21 0600 09/03/21 0420 09/04/21 0600   Weight: 67.9 kg (149 lb 9.6 oz) 70.2 kg (154 lb 11.2 oz) 71.6 kg (157 lb 12.8 oz)     Vital Signs with Ranges  Temp:  [97.8  F (36.6  C)-98.1  F (36.7  C)] 98.1  F (36.7  C)  Pulse:  [] 104  Resp:  [16-22] 16  BP: (110-131)/(74-80) 131/80  SpO2:  [79 %-97 %] 93 %  I/O last 3 completed shifts:  In: 2875 [NG/GT:2875]  Out: 2075 [Urine:2075]      Patient Vitals for the past 72 hrs:   Weight   09/04/21 0600 71.6 kg (157 lb 12.8 oz)       Intake/Output Summary (Last 24 hours) at 8/30/2021 1049  Last data filed at 8/30/2021 0519  Gross per 24 hour   Intake 3693 ml   Output 400 ml   Net 3293 ml       PHYSICAL EXAM:  General - Alert, uncertain if oriented, he is non-verbal, sitting up in bed, waving mitts  Cardiovascular - Regular rate and rhythm , BP controlled   Respiratory -trach capped, on RA, lungscourse, sats good  On RA  Extremities - No lower extremity edema bilaterally  Neuro:  Exam limited, doesn't follow commantds non-verbal, moving exgtremities  GI:  TF infusing  MSK:  Grossly intact, but global debit.   Psych:  flat affect  NO edema   Net +, wt sig up last 2 cks ? Accuracy?        LABORATORY STUDIES:     Recent Labs   Lab 09/06/21  0700   WBC 9.6   RBC 3.63*   HGB 11.5*   HCT 36.1*   PLT  314       Basic Metabolic Panel:  Recent Labs   Lab 09/06/21  0659 09/05/21  0648 09/04/21  0707 09/03/21  0640 09/02/21  0602   * 142 142 144 143   POTASSIUM 5.4* 4.7 4.9 4.6 4.7   CHLORIDE 105 102 102 102 103   CO2 31 29 32* 32* 31   BUN 49* 44* 43* 41* 42*   CR 0.98 0.89 0.88 0.88 0.82    119 114 117 118   RAGINI 11.7* 11.0* 11.1* 11.1* 10.9*       INRNo lab results found in last 7 days.     Recent Labs   Lab Test 09/06/21  0700 08/30/21  0634 05/16/21  0405 05/15/21  2125 05/15/21  1222   INR  --   --   --  1.20* 1.16*   WBC 9.6 7.1   < > 12.3* 16.5*   HGB 11.5* 10.5*   < > 11.8* 13.6    340   < > 239 257    < > = values in this interval not displayed.       Personally reviewed current labs     ~ 25 minutes spent in exam, POC, education regarding renal disease and management.  >90% time spent in education and counseling and/or discussion with patient's care team    Negin Welsh, [facility]  [facility]  [phone]

## 2021-09-06 NOTE — PLAN OF CARE
Problem: Device-Related Complication Risk (Artificial Airway)  Goal: Optimal Device Function  Outcome: No Change   Respiratory Care Progress Note    Airway/oxygen:    Current settings: capped, room air  Trach type/size: #6 Bivona placed on 8/31/2021    Actions:    Medications/therapies: Duoneb BID, Mucomyst BID, chest vest BID  Overnight suctioning   Frequency: 3x   Amount: large   Consistency: thick   Color: cloudy, white, small amount of yellow drainage   Spontaneous effort: strong, congested    Weaning phase: 3  Daytime plan: capped, room air  Nighttime plan: capped, room air    Response    Breath sounds: diminished, occasional scattered rhonchi  Vital signs: Temp: 97.8  F (36.6  C) Temp src: Oral BP: 110/74 Pulse: 94   Resp: 22 SpO2: 92 % O2 Device: None (Room air)    Emotional needs/concerns: none  Risk for self decannulation: yes   Risk due to: confusion   Intervention(s) in place: mitts, bilateral hands    Note/Plan: continue plan of care

## 2021-09-06 NOTE — PLAN OF CARE
Problem: Anxiety  Goal: Anxiety Reduction or Resolution  Outcome: Improving     Problem: Skin and Tissue Injury (Artificial Airway)  Goal: Absence of Device-Related Skin or Tissue Injury  Outcome: Improving   Pt vitals  . Denied pain when asked. Pt get restless at time when coughing. Noted  strong non productive cough. Suctioned thick copious amount of thick yellow secretion.  Pt O2 sat drops at times when coughing. Skin remains clean and intact. Will continue to monitor.

## 2021-09-07 ENCOUNTER — APPOINTMENT (OUTPATIENT)
Dept: SPEECH THERAPY | Facility: CLINIC | Age: 71
DRG: 207 | End: 2021-09-07
Attending: HOSPITALIST
Payer: COMMERCIAL

## 2021-09-07 ENCOUNTER — APPOINTMENT (OUTPATIENT)
Dept: OCCUPATIONAL THERAPY | Facility: CLINIC | Age: 71
DRG: 207 | End: 2021-09-07
Attending: HOSPITALIST
Payer: COMMERCIAL

## 2021-09-07 ENCOUNTER — APPOINTMENT (OUTPATIENT)
Dept: PHYSICAL THERAPY | Facility: CLINIC | Age: 71
DRG: 207 | End: 2021-09-07
Attending: HOSPITALIST
Payer: COMMERCIAL

## 2021-09-07 LAB
ALBUMIN SERPL-MCNC: 3.1 G/DL (ref 3.5–5)
ANION GAP SERPL CALCULATED.3IONS-SCNC: 9 MMOL/L (ref 5–18)
BUN SERPL-MCNC: 49 MG/DL (ref 8–22)
CALCIUM SERPL-MCNC: 11.1 MG/DL (ref 8.5–10.5)
CHLORIDE BLD-SCNC: 107 MMOL/L (ref 98–107)
CO2 SERPL-SCNC: 30 MMOL/L (ref 22–31)
CREAT SERPL-MCNC: 0.85 MG/DL (ref 0.7–1.3)
GFR SERPL CREATININE-BSD FRML MDRD: 90 ML/MIN/1.73M2
GLUCOSE BLD-MCNC: 116 MG/DL (ref 70–125)
PHOSPHATE SERPL-MCNC: 3.6 MG/DL (ref 2.5–4.5)
POTASSIUM BLD-SCNC: 4.6 MMOL/L (ref 3.5–5)
PTH-INTACT SERPL-MCNC: 35 PG/ML (ref 10–86)
SODIUM SERPL-SCNC: 146 MMOL/L (ref 136–145)

## 2021-09-07 PROCEDURE — 94640 AIRWAY INHALATION TREATMENT: CPT

## 2021-09-07 PROCEDURE — 250N000011 HC RX IP 250 OP 636

## 2021-09-07 PROCEDURE — 250N000013 HC RX MED GY IP 250 OP 250 PS 637: Performed by: INTERNAL MEDICINE

## 2021-09-07 PROCEDURE — 94669 MECHANICAL CHEST WALL OSCILL: CPT

## 2021-09-07 PROCEDURE — 99232 SBSQ HOSP IP/OBS MODERATE 35: CPT | Performed by: INTERNAL MEDICINE

## 2021-09-07 PROCEDURE — 92507 TX SP LANG VOICE COMM INDIV: CPT | Mod: GN | Performed by: SPEECH-LANGUAGE PATHOLOGIST

## 2021-09-07 PROCEDURE — 250N000009 HC RX 250: Performed by: INTERNAL MEDICINE

## 2021-09-07 PROCEDURE — 97530 THERAPEUTIC ACTIVITIES: CPT | Mod: GP | Performed by: PHYSICAL THERAPIST

## 2021-09-07 PROCEDURE — 99233 SBSQ HOSP IP/OBS HIGH 50: CPT | Performed by: HOSPITALIST

## 2021-09-07 PROCEDURE — 97112 NEUROMUSCULAR REEDUCATION: CPT | Mod: GP | Performed by: PHYSICAL THERAPIST

## 2021-09-07 PROCEDURE — 250N000013 HC RX MED GY IP 250 OP 250 PS 637: Performed by: NURSE PRACTITIONER

## 2021-09-07 PROCEDURE — 250N000013 HC RX MED GY IP 250 OP 250 PS 637

## 2021-09-07 PROCEDURE — 94640 AIRWAY INHALATION TREATMENT: CPT | Mod: 76

## 2021-09-07 PROCEDURE — 36415 COLL VENOUS BLD VENIPUNCTURE: CPT | Performed by: NURSE PRACTITIONER

## 2021-09-07 PROCEDURE — 97530 THERAPEUTIC ACTIVITIES: CPT | Mod: GO | Performed by: OCCUPATIONAL THERAPIST

## 2021-09-07 PROCEDURE — 250N000013 HC RX MED GY IP 250 OP 250 PS 637: Performed by: HOSPITALIST

## 2021-09-07 PROCEDURE — 99222 1ST HOSP IP/OBS MODERATE 55: CPT | Performed by: NURSE PRACTITIONER

## 2021-09-07 PROCEDURE — 999N000125 HC STATISTIC PATIENT MED CONFERENCE < 30 MIN: Performed by: OCCUPATIONAL THERAPIST

## 2021-09-07 PROCEDURE — 999N000009 HC STATISTIC AIRWAY CARE

## 2021-09-07 PROCEDURE — 94668 MNPJ CHEST WALL SBSQ: CPT

## 2021-09-07 PROCEDURE — 80069 RENAL FUNCTION PANEL: CPT | Performed by: NURSE PRACTITIONER

## 2021-09-07 PROCEDURE — 999N000123 HC STATISTIC OXYGEN O2DAILY TECH TIME

## 2021-09-07 PROCEDURE — 120N000001 HC R&B MED SURG/OB

## 2021-09-07 PROCEDURE — 999N000157 HC STATISTIC RCP TIME EA 10 MIN

## 2021-09-07 RX ORDER — GUAR GUM
2 PACKET (EA) ORAL 3 TIMES DAILY
Status: DISCONTINUED | OUTPATIENT
Start: 2021-09-07 | End: 2021-09-27 | Stop reason: HOSPADM

## 2021-09-07 RX ADMIN — VENLAFAXINE 50 MG: 25 TABLET ORAL at 16:52

## 2021-09-07 RX ADMIN — HEPARIN SODIUM 5000 UNITS: 5000 INJECTION, SOLUTION INTRAVENOUS; SUBCUTANEOUS at 21:45

## 2021-09-07 RX ADMIN — IPRATROPIUM BROMIDE AND ALBUTEROL SULFATE 3 ML: 2.5; .5 SOLUTION RESPIRATORY (INHALATION) at 07:52

## 2021-09-07 RX ADMIN — ANORECTAL OINTMENT: 15.7; .44; 24; 20.6 OINTMENT TOPICAL at 14:40

## 2021-09-07 RX ADMIN — ACETYLCYSTEINE 2 ML: 200 SOLUTION ORAL; RESPIRATORY (INHALATION) at 07:52

## 2021-09-07 RX ADMIN — MIRTAZAPINE 7.5 MG: 7.5 TABLET ORAL at 20:01

## 2021-09-07 RX ADMIN — ARMODAFINIL 150 MG: 150 TABLET ORAL at 06:09

## 2021-09-07 RX ADMIN — ANORECTAL OINTMENT: 15.7; .44; 24; 20.6 OINTMENT TOPICAL at 20:01

## 2021-09-07 RX ADMIN — IPRATROPIUM BROMIDE AND ALBUTEROL SULFATE 3 ML: 2.5; .5 SOLUTION RESPIRATORY (INHALATION) at 20:53

## 2021-09-07 RX ADMIN — Medication 2 PACKET: at 14:50

## 2021-09-07 RX ADMIN — VENLAFAXINE 50 MG: 25 TABLET ORAL at 08:29

## 2021-09-07 RX ADMIN — HEPARIN SODIUM 5000 UNITS: 5000 INJECTION, SOLUTION INTRAVENOUS; SUBCUTANEOUS at 06:09

## 2021-09-07 RX ADMIN — HEPARIN SODIUM 5000 UNITS: 5000 INJECTION, SOLUTION INTRAVENOUS; SUBCUTANEOUS at 14:40

## 2021-09-07 RX ADMIN — FAMOTIDINE 20 MG: 20 TABLET, FILM COATED ORAL at 20:01

## 2021-09-07 RX ADMIN — Medication 2 PACKET: at 20:01

## 2021-09-07 RX ADMIN — ANORECTAL OINTMENT: 15.7; .44; 24; 20.6 OINTMENT TOPICAL at 08:29

## 2021-09-07 RX ADMIN — VENLAFAXINE 50 MG: 25 TABLET ORAL at 11:57

## 2021-09-07 RX ADMIN — ACETYLCYSTEINE 2 ML: 200 SOLUTION ORAL; RESPIRATORY (INHALATION) at 20:53

## 2021-09-07 RX ADMIN — ACETAMINOPHEN ORAL SOLUTION 650 MG: 650 SOLUTION ORAL at 10:37

## 2021-09-07 RX ADMIN — AMLODIPINE BESYLATE 10 MG: 10 TABLET ORAL at 08:29

## 2021-09-07 RX ADMIN — FAMOTIDINE 20 MG: 20 TABLET, FILM COATED ORAL at 08:29

## 2021-09-07 ASSESSMENT — MIFFLIN-ST. JEOR: SCORE: 1488.14

## 2021-09-07 NOTE — PROGRESS NOTES
Sarina Villeda from IT dept called back and said that there is nothing they can do to make it work even if he comes to [place] this evening.  He said that it is meant to work 50-50 and at any time it stops working, that's it.  He said that our Manager supposed to know this.  Will update family and LTACH Manager.  Hao Blackwood RN

## 2021-09-07 NOTE — CONSULTS
INITIAL PSYCHIATRIC CONSULTATION  This note was created with the help of Dragon dictation system. Grammatical and typing errors are not intentional.            ASSESSMENT/RECOMMENDATIONS/PLAN :     Mild intermittent metabolic encephalopathy fluctuating in the context of prolonged hospitalization, subarachnoid hemorrhage, respiratory failure with hypoxia complex medical condition.  Depression and anxiety.  Cognitive and behavioral changes due to above.  Impulsivity, restlessness and agitation.  Sleep difficulties.    Recommendations:  Efforts at sleep regulation.  Gentle redirection and reassurance.  Venlafaxine 50 mg 3 times a day.  Mirtazapine 7.5 mg at bedtime.  Consider Seroquel 12.5 mg 4 times a day as needed for anxiety.      MENTAL STATUS EXAMINATION:   Appearance: Stated age, fluctuating levels of consciousness, calm.  Speech: Confused, mumbling.  Thought Process: Disorganized, disoriented and confused..  Thought content: Does not appear to respond to internally generated stimuli, no acute visual or auditory hallucination; .  No manic symptoms, no paranoia no delusional thoughts.  Thought Formation: No loosening of association or flight of ideas.  Judgment: Impaired  Insight : Impaired  Attention : Distracted, fluctuating levels of consciousness  Memory: Impaired, depressed  Fund Of Knowledge: Depressed  Affect: Flat  Mood: Confused disoriented  Alert and Oriented: Arousable, O x 1  Comprehension: Depressed   Generative thought content: Reduced  Language: Intact  Gait and Ambulation: With assist of one.  Problem solving: Impaired. Executive functioning : Impaired.   Cognitive set shifting and abstract reasoning: Impaired.  Constructional Apraxia. Not able to copy a three dimensional objet, or name an object.  No tremors. No muscle rigidity. Psychomotor slow.   Not engaging in a spontaneous conversation.      Vital Signs: BP (!) 133/91   Pulse 88   Temp 97.4  F (36.3  C) (Oral)   Resp 26   Ht 1.803 m  "(5' 10.98\")   Wt 69.6 kg (153 lb 8 oz)   SpO2 96%   BMI 21.42 kg/m        HISTORY OF PRESENT ILLNESS:   Presenting history to include: Per HMS/Specialists:   70 y.o. old male with past medical history of hypertension who presented to ED on 5/15/2021 for altered mental status. He had a fall and was found unresponsive by his wife. He was found to have acute subarachnoid hemorrhage.  On 5/15, he underwent left external ventricular drain placement.  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 One the same day, he also underwent craniotomy with clipping of PCOM aneurysm.  On 5/28/2021, patient had persistently elevated TCD, CT head showed bilateral BAYLEE infarct.  Next day, for persistent TCD and CTA showing bilateral BAYLEE vasospasm, was treated with milrinone and verapamil.  On 6/1/2021, angiogram showed no evidence of vasospasm.  He was extubated on 6/3/2021.On 6/6/2021, patient was reintubated due to drop in his saturation.  On 6/7/2021, patient underwent tracheostomy and PEG tube placement.  On 6/9/2021, EVD was removed.  Patient was initially started on Unasyn for possible pneumonia on 5/20/2021 which was switched to ceftriaxone the next day for sputum culture growing Klebsiella.  On 5/23, sputum culture also grew Pseudomonas for which ceftriaxone was switched to Zosyn.  Due to increased white blood cell counts, possibility of ventriculitis was raised however CSF was negative but antibiotic was switched to cefepime and vancomycin on 5/28/2021 with plan to continue for 2 weeks.  Vanco has been discontinued on 6/9/2021.  On 6/5/2021, sputum grew ESBL so cefepime was switched to meropenem.  He was transferred to LTACH on 6/11/21. Repeat sputum culture 6/19 and he was started on tobramycin nebulization. He has persistent tracheal secretions but improving.   6/14/21 CT head done for fatigue- read as slight increase in caliber of lateral and third ventricle  With possibility of developing communicating " "hydrocephalus. Discussed with Dr. Casillas (neurosurgeon at [facility]), who did not think there was much change, however suggested repeating CT head on 6/16/21 which did not show any change.   RT and pulmonary is following and pt is on phase 2 weaning with TM/PMV during day and full vent at night. Mental status is slowly improving. He is tolerating tube feeding.He has severe debility and malnourishment and is progressing slowly and appears malnourishment, weakness is major factor in slow progression and at risk for set backs.  Upon assessment patient was noted to be seated in chair, calm and is sleeping.  He was arousable to verbal stimuli and to touch but did not keep his eyes open.  He was unaware of his hospital environment.  He was unable to form words, just mumbling unclear if he was understanding the question.  Does not appear to be responding to internal generative stimuli.  Did not appear to be having psychosis or hallucinations.  Chart reviewed, patient has had significant complex hospitalization at [facility], now transferred to long-term acute care hospital.  Medications reviewed.  He is on venlafaxine 50 mg 3 times a day.  Mirtazapine 7.5 mg at bedtime.  Nursing notes reviewed.  Labs reviewed.  Review of Systems:As per HPI. Remainders of 12 point review of systems negative.  Psychiatric ROS:    Not a meaningful historian.      PFSH reviewed  and not pertinent to chief complaint/reason for visit  BP (!) 165/78 (BP Location: Right arm)   Pulse 88   Temp 97.4  F (36.3  C) (Oral)   Resp 26   Ht 1.803 m (5' 10.98\")   Wt 69.6 kg (153 lb 8 oz)   SpO2 96%   BMI 21.42 kg/m    Ethanol g/dL (g/dL)   Date Value   05/15/2021 <0.01     @24HOURRESULTS@  Recent Results (from the past 72 hour(s))   Renal panel    Collection Time: 09/05/21  6:48 AM   Result Value Ref Range    Sodium 142 136 - 145 mmol/L    Potassium 4.7 3.5 - 5.0 mmol/L    Chloride 102 98 - 107 mmol/L    Carbon Dioxide (CO2) 29 22 - 31 mmol/L "    Anion Gap 11 5 - 18 mmol/L    Urea Nitrogen 44 (H) 8 - 22 mg/dL    Creatinine 0.89 0.70 - 1.30 mg/dL    Calcium 11.0 (H) 8.5 - 10.5 mg/dL    Glucose 119 70 - 125 mg/dL    Albumin 3.1 (L) 3.5 - 5.0 g/dL    Phosphorus 3.6 2.5 - 4.5 mg/dL    GFR Estimate 88 >60 mL/min/1.73m2   Magnesium    Collection Time: 09/06/21  6:59 AM   Result Value Ref Range    Magnesium 2.2 1.8 - 2.6 mg/dL   Renal panel    Collection Time: 09/06/21  6:59 AM   Result Value Ref Range    Sodium 147 (H) 136 - 145 mmol/L    Potassium 5.4 (H) 3.5 - 5.0 mmol/L    Chloride 105 98 - 107 mmol/L    Carbon Dioxide (CO2) 31 22 - 31 mmol/L    Anion Gap 11 5 - 18 mmol/L    Urea Nitrogen 49 (H) 8 - 22 mg/dL    Creatinine 0.98 0.70 - 1.30 mg/dL    Calcium 11.7 (H) 8.5 - 10.5 mg/dL    Glucose 123 70 - 125 mg/dL    Albumin 3.3 (L) 3.5 - 5.0 g/dL    Phosphorus 3.9 2.5 - 4.5 mg/dL    GFR Estimate 79 >60 mL/min/1.73m2   CBC with platelets and differential    Collection Time: 09/06/21  7:00 AM   Result Value Ref Range    WBC Count 9.6 4.0 - 11.0 10e3/uL    RBC Count 3.63 (L) 4.40 - 5.90 10e6/uL    Hemoglobin 11.5 (L) 13.3 - 17.7 g/dL    Hematocrit 36.1 (L) 40.0 - 53.0 %    MCV 99 78 - 100 fL    MCH 31.7 26.5 - 33.0 pg    MCHC 31.9 31.5 - 36.5 g/dL    RDW 13.5 10.0 - 15.0 %    Platelet Count 314 150 - 450 10e3/uL    % Neutrophils 74 %    % Lymphocytes 17 %    % Monocytes 7 %    % Eosinophils 1 %    % Basophils 1 %    % Immature Granulocytes 0 %    NRBCs per 100 WBC 0 <1 /100    Absolute Neutrophils 7.1 1.6 - 8.3 10e3/uL    Absolute Lymphocytes 1.6 0.8 - 5.3 10e3/uL    Absolute Monocytes 0.7 0.0 - 1.3 10e3/uL    Absolute Eosinophils 0.1 0.0 - 0.7 10e3/uL    Absolute Basophils 0.1 0.0 - 0.2 10e3/uL    Absolute Immature Granulocytes 0.0 <=0.0 10e3/uL    Absolute NRBCs 0.0 10e3/uL   Basic metabolic panel    Collection Time: 09/06/21  4:49 PM   Result Value Ref Range    Sodium 146 (H) 136 - 145 mmol/L    Potassium 4.7 3.5 - 5.0 mmol/L    Chloride 104 98 - 107 mmol/L     Carbon Dioxide (CO2) 30 22 - 31 mmol/L    Anion Gap 12 5 - 18 mmol/L    Urea Nitrogen 51 (H) 8 - 22 mg/dL    Creatinine 0.87 0.70 - 1.30 mg/dL    Calcium 11.1 (H) 8.5 - 10.5 mg/dL    Glucose 114 70 - 125 mg/dL    GFR Estimate 89 >60 mL/min/1.73m2   Parathyroid Hormone Intact    Collection Time: 09/06/21  4:49 PM   Result Value Ref Range    Parathyroid Hormone Intact 35 10 - 86 pg/mL   Ferritin    Collection Time: 09/06/21  4:49 PM   Result Value Ref Range    Ferritin 119 27 - 300 ng/mL   Iron binding panel    Collection Time: 09/06/21  4:49 PM   Result Value Ref Range    Iron 73 42 - 175 ug/dL    Transferrin 248 212 - 360 mg/dL    Transferrin Saturation, Calculated 24 20 - 50 %    Transferrin IBC, Calculated 310 (L) 313 - 563 ug/dL   Renal panel    Collection Time: 09/07/21  6:41 AM   Result Value Ref Range    Sodium 146 (H) 136 - 145 mmol/L    Potassium 4.6 3.5 - 5.0 mmol/L    Chloride 107 98 - 107 mmol/L    Carbon Dioxide (CO2) 30 22 - 31 mmol/L    Anion Gap 9 5 - 18 mmol/L    Urea Nitrogen 49 (H) 8 - 22 mg/dL    Creatinine 0.85 0.70 - 1.30 mg/dL    Calcium 11.1 (H) 8.5 - 10.5 mg/dL    Glucose 116 70 - 125 mg/dL    Albumin 3.1 (L) 3.5 - 5.0 g/dL    Phosphorus 3.6 2.5 - 4.5 mg/dL    GFR Estimate 90 >60 mL/min/1.73m2       PMH:   Past Medical History:   Diagnosis Date     Acute respiratory failure with hypoxia and hypercapnia (H) 7/16/2021     Cognitive impairment 7/16/2021     Displacement of lumbar intervertebral disc without myelopathy 1994    Lumbar disc rupture, no surgery     Left-sided neglect 7/16/2021     Other dysphagia 7/16/2021     Stage 3a chronic kidney disease 8/25/2021    Based on 24 hour urine collection           Current Medications:Scheduled Meds:    acetylcysteine  2 mL Nebulization BID     amLODIPine  10 mg Oral Daily     armodafinil  150 mg Per Feeding Tube QAM     famotidine  20 mg Oral or Feeding Tube BID     heparin ANTICOAGULANT  5,000 Units Subcutaneous Q8H     ipratropium - albuterol 0.5  mg/2.5 mg/3 mL  3 mL Nebulization BID     menthol-zinc oxide   Topical TID     mirtazapine  7.5 mg Per Feeding Tube At Bedtime     scopolamine  1 patch Transdermal Q72H    And     scopolamine   Transdermal Q8H     venlafaxine  50 mg Per Feeding Tube TID w/meals     Continuous Infusions:    dextrose       sodium chloride       PRN Meds:.acetaminophen **OR** acetaminophen, alteplase, bisacodyl, dextrose, [DISCONTINUED] glucose **OR** dextrose **OR** glucagon, docusate **OR** docusate sodium, hydrALAZINE, loperamide, magnesium hydroxide, naloxone **OR** naloxone **OR** naloxone **OR** naloxone, ondansetron, polyethylene glycol, silver nitrate, sodium chloride                Family History: PERSONALLY REVIEWED.  Family History   Problem Relation Age of Onset     Hypertension Mother      Cancer Mother         colon cancer @ 85     Diabetes Father         type 2 diabetes     Cancer Father         lung cancer     Hypertension Sister      Psychotic Disorder Sister         depression     Hypertension Sister      Hypertension Sister      Hypertension Sister      Pertinent Family hx not pertinent to Chief Complaint or reason for visit.     Social History:  PERSONALLY REVIEWED.  Social History     Socioeconomic History     Marital status:      Spouse name: Not on file     Number of children: Not on file     Years of education: Not on file     Highest education level: Not on file   Occupational History     Not on file   Tobacco Use     Smoking status: Never Smoker     Smokeless tobacco: Never Used   Substance and Sexual Activity     Alcohol use: Yes     Comment: moderate use     Drug use: No     Sexual activity: Not Currently   Other Topics Concern      Service Not Asked     Blood Transfusions No     Caffeine Concern Not Asked     Comment: 3-4 cups per day     Occupational Exposure Not Asked     Hobby Hazards Not Asked     Sleep Concern Not Asked     Stress Concern Not Asked     Weight Concern Not Asked      Special Diet Not Asked     Back Care Not Asked     Exercise Yes     Comment: walk 1 mile per day when weather permits     Bike Helmet Not Asked     Seat Belt Yes     Self-Exams Not Asked     Parent/sibling w/ CABG, MI or angioplasty before 65F 55M? No   Social History Narrative     Not on file     Social Determinants of Health     Financial Resource Strain:      Difficulty of Paying Living Expenses:    Food Insecurity:      Worried About Running Out of Food in the Last Year:      Ran Out of Food in the Last Year:    Transportation Needs:      Lack of Transportation (Medical):      Lack of Transportation (Non-Medical):    Physical Activity:      Days of Exercise per Week:      Minutes of Exercise per Session:    Stress:      Feeling of Stress :    Social Connections:      Frequency of Communication with Friends and Family:      Frequency of Social Gatherings with Friends and Family:      Attends [employer]ical Services:      Active Member of Clubs or Organizations:      Attends Club or Organization Meetings:      Marital Status:    Intimate Partner Violence:      Fear of Current or Ex-Partner:      Emotionally Abused:      Physically Abused:      Sexually Abused:     not pertinent to Chief Complaint or reason for visit.             Allergies as of 06/01/2014 Reviewed     Review of Systems:As per HPI. Remainders of 12 point review of systems negative.    Review of Pertinent Laboratory:      PERSONALLY REVIEWED.    Physical Exam: Temp:  [97.3  F (36.3  C)-97.9  F (36.6  C)] 97.4  F (36.3  C)  Pulse:  [82-96] 88  Resp:  [15-26] 26  BP: (115-165)/(76-79) 165/78  SpO2:  [79 %-96 %] 96 %   Vitals: reviewed in chart     Physical exam as per medical team: reviewed in chart      diagnoses, risk and benefits of medications discussed with staff. Care coordination with care management team.   Thank you for this consultation.       Kim Whitaker; NP  [facility]        This note was created with the help of  Dragon dictation system. Grammatical and typing errors are not intentional.

## 2021-09-07 NOTE — PROGRESS NOTES
RENAL PROGRESS NOTE    HPI: 70 year old male with past medical history of hypertension, admitted with subarachnoid hemorrhage after a fall on 5/15/2021 s/p emergent left external ventricular drain placement with posterior communicating artery aneurysm defied structured underwent craniotomy with clipping of aneurysm , complicated by persistent cerebral vasospasms and bilateral BAYLEE infarct.  Course also complicated by respiratory failure with intubation pneumonia was treated with cefepime and Vanco, transferred to LTAC on 6/11/2021.  Nephrology consulted for hypercalcemia on 8/15/2021     Hypercalcemia: moderate and stable d/t relative immobility with prolonged hospitalization, and CKD/reduced clearance  Corrected Ca 11-12's   PTH 26 on 8/11/2021, vitamin D level 30 8/15/2021  No history suggestive of any lymphoma or lymphadenopathy or any granulomatosis related disorders to suggest any additional etiology.  PLAN:  Avoid  Ca/D suppl  Increase Free water 250cc q 4  Lower Ca TF  Recheck on Friday    HyperK:  Got 1L NS and changed to amlodipine, stable today    Volume:  Appears euvolemic despite net +30L by I/Os.  HOlding on diuretics with persistent high sodium.      CKD 3:  With baseline crea 0.9, 24 hour cr clearance estimates GFR at 45     Hypertension: BP had stable on single agent lisinopril 40 mg, changed to amlodipine 10mg with hyperkalemia.  BP trending up a little more today, would follow     Anemia:  hgb trending 10-11s and stable, likely Inflammatory and malnutrition primary , Mild management per hospitalist medicine, ck iron studies and replace if indicated, no obvious bleed      Acute on chronic hypoxemic respiratory failure: trach capped, weaning phase 3 but trach remains d/t copious secretions, keeping a bit on the dry side to promote resp status,   S/p tx ESBL Pseudomonas and Klebsiella pneumonia  Pulmonary following  Currently off antibiotics     Malnutrition dysphagia after CVA - on tube  feeds, adjusted for lower calcium content, look into low K options      SAH w fall sp craniotomy and hematoma evacuation , PCA aneurysm clip  cb BAYLEE stroke ad vasopasm  Now on rehab, speech and OT/PT therapies     Insomnia/mood disorders  On Ritalin and mirtazapine  Management per primary medicine     Metabolic encephalopathy - ongoing           SUBJECTIVE: Patient new to me.  Nonverbal.  Reviewed chart.  K mildly high yesterday and received 1L NS and changed from lisinopril to amlodipine.  K stable today.    OBJECTIVE:  Physical Exam   Temp: 97.4  F (36.3  C) Temp src: Oral BP: (!) 133/91 Pulse: 88   Resp: 26 SpO2: 96 % O2 Device: None (Room air)    Vitals:    09/03/21 0420 09/04/21 0600 09/07/21 0615   Weight: 70.2 kg (154 lb 11.2 oz) 71.6 kg (157 lb 12.8 oz) 69.6 kg (153 lb 8 oz)     Vital Signs with Ranges  Temp:  [97.3  F (36.3  C)-97.9  F (36.6  C)] 97.4  F (36.3  C)  Pulse:  [82-98] 88  Resp:  [15-26] 26  BP: (115-165)/(76-91) 133/91  SpO2:  [79 %-96 %] 96 %  I/O last 3 completed shifts:  In: 3369 [I.V.:799; NG/GT:2570]  Out: 1150 [Urine:1150]      Patient Vitals for the past 72 hrs:   Weight   09/07/21 0615 69.6 kg (153 lb 8 oz)       Intake/Output Summary (Last 24 hours) at 8/30/2021 1049  Last data filed at 8/30/2021 0519  Gross per 24 hour   Intake 3693 ml   Output 400 ml   Net 3293 ml       PHYSICAL EXAM:  General - Alert, uncertain if oriented, he is non-verbal, sitting up in bed, waving mitts  Cardiovascular - Regular rate and rhythm , BP controlled   Respiratory -trach capped, on RA, lungscourse, sats good  On RA  Extremities - No lower extremity edema bilaterally  Neuro:  Exam limited, doesn't follow commantds non-verbal, moving exgtremities  GI:  TF infusing  MSK:  Grossly intact, but global debit.   Psych:  flat affect  NO edema   Net +, wt sig up last 2 cks ? Accuracy?        LABORATORY STUDIES:     Recent Labs   Lab 09/06/21  0700   WBC 9.6   RBC 3.63*   HGB 11.5*   HCT 36.1*           Basic Metabolic Panel:  Recent Labs   Lab 09/07/21  0641 09/06/21  1649 09/06/21  0659 09/05/21  0648 09/04/21  0707 09/03/21  0640   * 146* 147* 142 142 144   POTASSIUM 4.6 4.7 5.4* 4.7 4.9 4.6   CHLORIDE 107 104 105 102 102 102   CO2 30 30 31 29 32* 32*   BUN 49* 51* 49* 44* 43* 41*   CR 0.85 0.87 0.98 0.89 0.88 0.88    114 123 119 114 117   RAGINI 11.1* 11.1* 11.7* 11.0* 11.1* 11.1*       INRNo lab results found in last 7 days.     Recent Labs   Lab Test 09/06/21  0700 08/30/21  0634 05/16/21  0405 05/15/21  2125 05/15/21  1222   INR  --   --   --  1.20* 1.16*   WBC 9.6 7.1   < > 12.3* 16.5*   HGB 11.5* 10.5*   < > 11.8* 13.6    340   < > 239 257    < > = values in this interval not displayed.       Personally reviewed current labs    Cortez Dent  [facility]  [phone]

## 2021-09-07 NOTE — CONSULTS
CLINICAL NUTRITION SERVICES  -  ASSESSMENT NOTE      Recommendations Ordered by Registered Dietitian (RD):   Recommend TF as follows:   Type of Feeding Tube: Gastrostomy  Enteral Frequency:  Continuous  Enteral Regimen: Novasource Renal at 65 mL/hr  Nutrisource Fiber: 2 packets TID  Total Enteral Provisions: 1560 mL daily provides 3210 kcal (46 kcal per kg), 142g protein (2g per kg), 285g CHO, 18g fiber and 1120mL free water. Meets 100% of DRI's.  Free Water Flush: standard 250 mL q4 hours  TF + fluid flush = 2620 mL per day  1564 mg K/day  1310 mg Ca/day    Daily electrolyte check, Mg and Phos add on  Daily weights  Start at 20 mL/hr, increase by 15 mL q8 hours until goal rate reached   Malnutrition:   % Weight Loss:  Weight loss does not meet criteria for malnutrition - overall, weight stable over past 3 months  % Intake:  No decreased intake noted  Subcutaneous Fat Loss:  Orbital region severe depletion and Upper arm region severe depletion  Muscle Loss:  Temporal region severe depletion, Clavicle bone region severe depletion, Acromion bone region severe depletion, Dorsal hand region severe depletion, Anterior thigh region severe depletion and Posterior calf region severe depletion    Malnutrition Diagnosis: Severe malnutrition  In Context of:  Chronic illness or disease     REASON FOR ASSESSMENT  Dayron Neri is a 70 year old male seen by Registered Dietitian for Provider Order - RD to help with lower K and low Ca TF options.    PMH:  - subarachnoid hemorrhage secondary to a ruptured aneurysm.  - tracheostomy and PEG-tube placed on 07-Jun-2021.  - dysphagia    CURRENT NUTRITION ORDERS  Diet Order:     NPO     Current Intake/Tolerance:  Enteral Regimen: [employer] Peptide 1.5 at 90 mL/hr   Total Enteral Provisions: 2160 mL daily provides 3240 kcal, 160g protein, 260g CHO, 17g fiber and 1512 mL free water. Meets 100% of DRI's.   Free Water Flush: 200 mL q4 hours - same as current order   TF + fluid flush = 2592  "mL per day   Total Ca from TF = 2016 mg     NUTRITION FOCUSED PHYSICAL ASSESSMENT FOR DIAGNOSING MALNUTRITION)  Yes       Obtained from Chart/Interdisciplinary Team:  - Trach weaning has been very slow due to his cognitive impairment.  - Intensive dysphagia treatment has been discontinued per SLP  - Ca continues to be elevated, last lab results today 9/7 11.1 mg/dL    ANTHROPOMETRICS  Height: 5' 10.984\"  Weight: 153 lbs 8 oz  Body mass index is 21.42 kg/m .  Weight Status:  Normal BMI  Weight History:   Weight stable around 150#    LABS  Labs reviewed  Labs:  Electrolytes  Potassium (mmol/L)   Date Value   09/07/2021 4.6   09/06/2021 4.7   09/06/2021 5.4 (H)   06/11/2021 3.8   06/10/2021 3.7   06/09/2021 3.8     Phosphorus (mg/dL)   Date Value   09/07/2021 3.6   09/06/2021 3.9   09/05/2021 3.6   09/04/2021 3.7   09/03/2021 3.6   06/11/2021 2.9   06/10/2021 2.5   06/09/2021 2.5   06/08/2021 2.3 (L)   06/07/2021 2.9    Blood Glucose  Glucose (mg/dL)   Date Value   09/07/2021 116   09/06/2021 114   09/06/2021 123   09/05/2021 119   09/04/2021 114   06/11/2021 142 (H)   06/10/2021 134 (H)   06/09/2021 117 (H)   06/08/2021 125 (H)   06/07/2021 103 (H)     Hemoglobin A1C (%)   Date Value   05/15/2021 5.5    Inflammatory Markers  CRP Inflammation (mg/L)   Date Value   06/01/2021 79.0 (H)   05/30/2021 120.0 (H)   05/28/2021 150.0 (H)     WBC (10e9/L)   Date Value   06/11/2021 8.2   06/10/2021 9.4   06/09/2021 10.4     WBC Count (10e3/uL)   Date Value   09/06/2021 9.6   08/30/2021 7.1   08/23/2021 8.1     Albumin (g/dL)   Date Value   09/07/2021 3.1 (L)   09/06/2021 3.3 (L)   09/05/2021 3.1 (L)   06/02/2021 1.7 (L)   05/26/2021 2.1 (L)   05/23/2021 1.9 (L)      Magnesium (mg/dL)   Date Value   09/06/2021 2.2   08/30/2021 2.1   08/23/2021 2.4   06/11/2021 2.5 (H)   06/10/2021 2.4 (H)   06/09/2021 2.4 (H)     Sodium (mmol/L)   Date Value   09/07/2021 146 (H)   09/06/2021 146 (H)   09/06/2021 147 (H)   06/11/2021 143 "   06/10/2021 142   06/09/2021 143    Renal  Urea Nitrogen (mg/dL)   Date Value   09/07/2021 49 (H)   09/06/2021 51 (H)   09/06/2021 49 (H)   06/11/2021 25   06/10/2021 23   06/09/2021 24     Creatinine (mg/dL)   Date Value   09/07/2021 0.85   09/06/2021 0.87   09/06/2021 0.98   06/11/2021 0.57 (L)   06/10/2021 0.52 (L)   06/09/2021 0.49 (L)     Additional  Triglycerides (mg/dL)   Date Value   05/28/2021 91   06/05/2013 178 (H)   09/19/2007 109     Ketones Urine   Date Value   07/01/2021 Negative   06/01/2021 Negative mg/dL          MEDICATIONS  Medications reviewed    acetylcysteine  2 mL Nebulization BID     amLODIPine  10 mg Oral Daily     armodafinil  150 mg Per Feeding Tube QAM     famotidine  20 mg Oral or Feeding Tube BID     heparin ANTICOAGULANT  5,000 Units Subcutaneous Q8H     ipratropium - albuterol 0.5 mg/2.5 mg/3 mL  3 mL Nebulization BID     menthol-zinc oxide   Topical TID     mirtazapine  7.5 mg Per Feeding Tube At Bedtime     scopolamine  1 patch Transdermal Q72H    And     scopolamine   Transdermal Q8H     venlafaxine  50 mg Per Feeding Tube TID w/meals        dextrose       sodium chloride        acetaminophen **OR** acetaminophen, alteplase, bisacodyl, dextrose, [DISCONTINUED] glucose **OR** dextrose **OR** glucagon, docusate **OR** docusate sodium, hydrALAZINE, loperamide, magnesium hydroxide, naloxone **OR** naloxone **OR** naloxone **OR** naloxone, ondansetron, polyethylene glycol, silver nitrate, sodium chloride     ASSESSED NUTRITION NEEDS PER APPROVED PRACTICE GUIDELINES:    Dosing Weight 69.6 kg    NUTRITION DIAGNOSIS:  Impaired nutrient utilization related to CKD as evidenced by need for renal TF formula with lower Ca and K.    NUTRITION INTERVENTIONS  Recommendations / Nutrition Prescription  Recommend TF as follows:   Type of Feeding Tube: Gastrostomy  Enteral Frequency:  Continuous  Enteral Regimen: Novasource Renal at 65 mL/hr  Nutrisource Fiber: 2 packets TID  Total Enteral  Provisions: 1560 mL daily provides 3210 kcal (46 kcal per kg), 142g protein (2g per kg), 285g CHO, 18g fiber and 1120mL free water. Meets 100% of DRI's.  Free Water Flush: standard 250 mL q4 hours  TF + fluid flush = 2620 mL per day  1564 mg K/day  1310 mg Ca/day    Daily electrolyte check, Mg and Phos add on  Daily weights  Start at 20 mL/hr, increase by 15 mL q8 hours until goal rate reached    Implementation  Nutrition education: Not appropriate at this time due to patient condition  EN Composition, EN Schedule and Feeding Tube Flush    Nutrition Goals  Pt. To meet goal rate within 48 hours.    MONITORING AND EVALUATION:  Progress towards goals will be monitored and evaluated per protocol and Practice Guidelines    Dulce Gonzales RDN, LD  Clinical Dietitian

## 2021-09-07 NOTE — PROGRESS NOTES
Patient's wife called to notify that they were not able to connect to [employer] video conferencing with patient with the same Device ID and PIN.  Writer attempted to connect the device the provided ID and PIN (as shown in Epic) but was not able due to the PIN being incorrect.  Another device was tried with the same result.  Tried another patient's ID and PIN and works.  IT was called and they will will call staff back with case# [id number].  Hao Blackwood RN

## 2021-09-07 NOTE — PLAN OF CARE
Problem: Restraint for Non-Violent/Non-Self-Destructive Behavior  Goal: Prevent/Manage Potential Problems  Description: Maintain safety of patient and others during period of restraint.  Promote psychological and physical wellbeing.  Prevent injury to skin and involved body parts.  Promote nutrition, hydration, and elimination.  Outcome: No Change   Patient continue on bilateral mitt for pulling on tubes , VSS, No PRN given on this shift.

## 2021-09-07 NOTE — PLAN OF CARE
"  Problem: Device-Related Complication Risk (Artificial Airway)  Goal: Optimal Device Function  Outcome: No Change     Problem: Skin and Tissue Injury (Artificial Airway)  Goal: Absence of Device-Related Skin or Tissue Injury  Outcome: No Change     Problem: Communication Impairment (Artificial Airway)  Goal: Effective Communication  Outcome: Improving   RT PROGRESS NOTE     DATA:     CURRENT SETTINGS:             TRACH TYPE / SIZE:  #6 bivona changed 8/29             MODE:   capped on RA                ACTION:             THERAPIES:   duo neb and mucomyst bid, vest bid             SUCTION:                           FREQUENCY:   x4                        AMOUNT:   large to copious                        CONSISTENCY:   thick tenacious                        COLOR:   white to yellow             SPONTANEOUS COUGH EFFORT/STRENGTH OF EFFORT (not elicited by suctioning): weak to moderate                              WEANING PHASE:   3                        WEAN MODE:    capped continuous on RA                          RESPONSE:             BS:   coarse and diminished             VITAL SIGNS:   Blood pressure (!) 140/92, pulse 93, temperature 98.5  F (36.9  C), temperature source Oral, resp. rate 20, height 1.803 m (5' 10.98\"), weight 69.6 kg (153 lb 8 oz), SpO2 98 %.                 EMOTIONAL NEEDS / CONCERNS:  Pt states he is feeling a little sad today.               RISK FOR SELF DECANNULATION:  yes                        RISK DUE TO:  Confusion/impulsive                        INTERVENTION/S IN PLACE IS/ARE:  Bilateral mitts       NOTE / PLAN:   Continue pulmonary toilet.    "

## 2021-09-07 NOTE — PLAN OF CARE
Plan of Care by Jacqueline Vieyra RN at 2021  9:23 AM     Author: Jacqueline Vieyra RN Service: -- Author Type: RN, Care Manager    Filed: 2021  9:33 AM Date of Service: 2021  9:23 AM Status: Signed    : Jacqueline Vieyra RN (RN, Care Manager)       Care Management Progression of Care Update        DR GLOS - Target D/C Date TBD        PLAN/GOALS  1. Pulmonary following. Phase 3 wean~continous heated trach mask with hi rosemarie 21% Fi02 tolerated PMV days.  Mucomyst, Duo-neb, mucomyst and metaNeb two times a day for pulmonary hygiene.  Frequent suctioning 3-6x/shift.    2.  Nutrition management.  Tube feeding via PEG placed 21.  Registered dietician to monitor tube feeding tolerance, weight and labs.    3.  Neurology following intermittently.  Next scheduled repeat CTA of head and neck in mid September.    4.  Nephrology following for stage 3 chronic kidney disease.    4. PT/OT 5x/week.     5.  Speech therapy 3x/week. Continue effortful swallow exercises and voicing.    6.  Palliative following.    7.  Psychiatric consult.           BARRIERS  1.  Acute hypoxic respiratory failure due to subarachnoid hemorrhage. Trach and oxygen wean.    2.  Traumatic brain injury/intracranial hemorrhage/acute ischemic stroke.    3.  Oropharyngeal dysphagia.    4.   Bilateral mitts for pulling at tubes and lines.    5.  Frequent suctioning of copious secretions.    6.  Stage 3 Chronic Kidney Disease.        Disposition:  TCU  Care Manager Name:  Jacqueline Vieyra RN,BSN, HNB-BC    Date/Time:  21 @ 11:54 AM

## 2021-09-07 NOTE — PLAN OF CARE
Problem: Device-Related Complication Risk (Artificial Airway)  Goal: Optimal Device Function  Outcome: No Change   Respiratory Care Progress Note    Airway/oxygen:    Current settings: capped, room air  Trach type/size: #6 Bivona placed on 8/31/2021    Actions:    Medications/therapies: Duoneb BID, Mucomyst BID, chest vest BID  Overnight suctioning   Frequency: 3x   Amount: moderate-large   Consistency: thick   Color: cloudy, white   Spontaneous effort: strong, congested    Weaning phase: 3  Daytime plan: capped, room air  Nighttime plan: capped, room air    Response    Breath sounds: diminished, occasional rhonchi that clears with cough  Vital signs:   Temp: 97.3  F (36.3  C) Temp src: Tympanic BP: 115/79 Pulse: 82   Resp: 16 SpO2: 95 % O2 Device: None (Room air)    Emotional needs/concerns: none  Risk for self decannulation: yes   Risk due to: confusion   Intervention(s) in place: mitts bilateral hands    Note/Plan: continue plan of care

## 2021-09-07 NOTE — PROGRESS NOTES
Patient had physical therapy around 3 pm.Patient is up in chair and sleeping when family was in the video. They were concerned that patient has been lethargic since the weekend and is worst today.They wanted to talked to the doctor.Vital signs are checked and is WDL except for the blood pressure which is 140/92 mmHg.Patient is back to bed,this time patient is alert and answers questions appropriately.He denies pain .Will continue to monitor patient's status closely. Johanna Humphrey RN

## 2021-09-07 NOTE — PLAN OF CARE
Problem: Adult Inpatient Plan of Care  Goal: Optimal Comfort and Wellbeing  Outcome: Improving   Up in the chair. Tylenol 650 mg given for headache which was reported by the wife who was on video call. Novasource renal started at 3 pm  at 20 ml/hr for the next 8 hrs. Today's Na+ is 146 and K+ is 4.6.

## 2021-09-07 NOTE — PROGRESS NOTES
[facility]    Medicine Progress Note - Hospitalist Service       Date of Admission:  6/11/2021    Summary:  Dayron Neri is a 70 year male with h/o HTN who presented to ED on 5/15/2021 with acute SAH after a fall with unresponsiveness.  He underwent emergent left external ventricular drain placement.  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  On 5/28/2021, patient had persistent cerebral vasospasm, CT head showed bilateral BAYLEE infarct and was treated with milrinone and verapamil.  On 6/1/2021, angiogram showed no evidence of vasospasm.  Extubated on 6/3/2021.  6/6/2021 he was reintubated due to hypoxia.  On 6/7/2021, patient underwent tracheostomy and PEG tube placement.  EVD removed 6/9/2021.   Patient was treated for possible pneumonia with Unasyn on 5/20/2021, It was switched to ceftriaxone the next day for sputum culture growing Klebsiella. On 5/23, sputum culture also grew Pseudomonas. Antibiotic was switched to Zosyn.  Due to increased WBCs in CSF, possibility of ventriculitis was raised. CSF culture was negative. Antibiotic was switched to cefepime and vancomycin on 5/28/2021 for 2 weeks.  Vanco was discontinued on 6/9/2021. On 6/5/2021, sputum grew ESBL so cefepime was switched to meropenem. He was transferred to [facility] on 6/11/21.     Since admission to [facility], patient did not make significant progression for his neurology status. Trach weaning has been very slow due to his cognitive impairment.       Assessment & Plan      Acute on chronic hypoxemic respiratory failure: S/p treatment pseudomonas ESBL and Klebsiella pneumonia. Has excessive secretions. Weaning has been slow. Per pulmonary, his cognition prevents him from improving further. His pulmonary status could improve more if his cognition improves.   - Has been capped on room air since 8/31. Per pulmonary, no plan for decannulation due to excessive secretions.   -  "Continue to wean per RT and pulmonary.   - Airway secretion: Glycopyrrolate was tried and stopped. 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 Last CT head done on 7/1/21, which shows resolution of previous multicompartment hemorrhage in the right frontal lobe and the ventricles.   - Need follow up with Dr. Martinez with Neurosurgery in 3 months with repeat CTA of head and neck for post-op f/u. Next CTA of head and neck mid September.     Acute metabolic encephalopathy/cognitive impairment: His current neurology status is likely his new baseline due to frontal lobe injury.  Wife is still hopeful he will improve further but it is unclear if he has the capacity to focus and learn what they are attempting to teach him during therapy.  - Nuvigil started on 8/27 per neurology recommendation  - Patient continues to pull his IV lines and trach, which requires bilateral mitten restraint. Per nursing staff, patient has hallucination. Psych consulted and awaiting recommendations..   - Continue Effexor     Dysphagia: On tube feeds and NPO.   - Speech therapy following. Per note from 8/17/21, despite several weeks of working with patient there has been \"no functional improvement in patient's ability to manage his oropharyngeal secretions.\" Speech discussed this with wife. Intensive dysphagia treatment has been discontinued.     Severe malnutrition: has moderate/severe subcutaneous fat loss, moderate/severe muscle loss  - continuous tube feeding per dietician     Chronic kidney disease (CKD) stage 3a: Creatinine has been stable.    Anemia: hemoglobin stable. Continue to monitor.    Essential hypertension: BP controlled. On amlodipine.    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 Sputum culture 6/19 showed MDR pseudomonas. On Tobramycin nebs 6/24/2021 - 7/9/2021.   - Continue pulmonary toilet, scheduled duonebs    Ventilator associated pneumonia: Completed antibiotics on July 18.    Severe debility, weakness, critical illness, deconditioning: Continue PT/OT    Hypercalcemia: unclear etiology, mild at this point. Parathyroid hormone normal. May be related to relative immobilization. Continue to monitor. Appreciate renal input.    Insomnia: Continue nighttime mirtazapine 7.5 mg scheduled.    Left inguinal hernia: US on 6/22/21 shows large left inguinal hernia containing mesenteric fat and bowel, extending to the superior scrotum, no evidence of intratesticular mass. Currently has no acute issue and no intervention indicated.       Diet: Adult Formula Drip Feeding: Continuous [place] Peptide 1.5; Gastrostomy; Goal Rate: 90; mL/hr; Medication - Feeding Tube Flush Frequency: At least 15-30 mL water before and after medication administration and with tube clogging; Amount to Send ...    DVT Prophylaxis: Heparin SQ  Gutierrez Catheter: Not present  Central Lines: None  Code Status: Full Code      Disposition Plan   Expected discharge: to be determined    The patient's care was discussed with RN, SW/CM    Kim Knox MD  Hospitalist Service  [facility]      ______________________________________________________________________    Interval History    Chart reviewed and events noted.  Patient seen and examined.   Appears comfortable talks slowly, mouthing words. Denies any pain/discomfort.     Physical Exam   Vital Signs: Temp: 97.4  F (36.3  C) Temp src: Oral BP: (!) 165/78 Pulse: 92   Resp: 22 SpO2: 95 % O2 Device: None (Room air)    Weight: 153 lbs 8 oz    General appearance: Not in acute distress  HEENT: PERRL, EOMI. Trach in place.  Lungs: Clear breath sounds in bilateral lung fields  Cardiovascular: Regular rate and  rhythm, normal S1-S2  Abdomen: Soft, non tender, no distension  Neurology: Awake and alert. Mumble speech and hard to understand.       Data   Recent Labs   Lab 09/07/21  0641 09/06/21  1649 09/06/21  0700 09/06/21  0659   WBC  --   --  9.6  --    HGB  --   --  11.5*  --    MCV  --   --  99  --    PLT  --   --  314  --    * 146*  --  147*   POTASSIUM 4.6 4.7  --  5.4*   CHLORIDE 107 104  --  105   CO2 30 30  --  31   BUN 49* 51*  --  49*   CR 0.85 0.87  --  0.98   ANIONGAP 9 12  --  11   RAGINI 11.1* 11.1*  --  11.7*    114  --  123   ALBUMIN 3.1*  --   --  3.3*       Restraint:    Patient pulls his IV line and trach.     Within an hour after restraint an in person face to face assessment was completed, including an evaluation of the patient's immediate reaction to the intervention, behavioral assessment and review/assessment of history, drugs and medications, recent labs, etc., and behavioral condition.  The patient experienced: No adverse physical outcome from seclusion/restraint initiation.  The intervention of restraint or seclusion needs to continue.

## 2021-09-08 ENCOUNTER — APPOINTMENT (OUTPATIENT)
Dept: CT IMAGING | Facility: CLINIC | Age: 71
DRG: 207 | End: 2021-09-08
Attending: HOSPITALIST
Payer: COMMERCIAL

## 2021-09-08 ENCOUNTER — APPOINTMENT (OUTPATIENT)
Dept: PHYSICAL THERAPY | Facility: CLINIC | Age: 71
DRG: 207 | End: 2021-09-08
Attending: HOSPITALIST
Payer: COMMERCIAL

## 2021-09-08 LAB
ALBUMIN SERPL-MCNC: 3.2 G/DL (ref 3.5–5)
ANION GAP SERPL CALCULATED.3IONS-SCNC: 12 MMOL/L (ref 5–18)
BUN SERPL-MCNC: 43 MG/DL (ref 8–22)
CALCIUM SERPL-MCNC: 11.1 MG/DL (ref 8.5–10.5)
CHLORIDE BLD-SCNC: 104 MMOL/L (ref 98–107)
CO2 SERPL-SCNC: 28 MMOL/L (ref 22–31)
CREAT SERPL-MCNC: 0.83 MG/DL (ref 0.7–1.3)
GFR SERPL CREATININE-BSD FRML MDRD: 90 ML/MIN/1.73M2
GLUCOSE BLD-MCNC: 121 MG/DL (ref 70–125)
PHOSPHATE SERPL-MCNC: 3.7 MG/DL (ref 2.5–4.5)
POTASSIUM BLD-SCNC: 4 MMOL/L (ref 3.5–5)
SODIUM SERPL-SCNC: 144 MMOL/L (ref 136–145)

## 2021-09-08 PROCEDURE — 94669 MECHANICAL CHEST WALL OSCILL: CPT

## 2021-09-08 PROCEDURE — 99233 SBSQ HOSP IP/OBS HIGH 50: CPT | Performed by: HOSPITALIST

## 2021-09-08 PROCEDURE — 97530 THERAPEUTIC ACTIVITIES: CPT | Mod: GP | Performed by: PHYSICAL THERAPIST

## 2021-09-08 PROCEDURE — 94668 MNPJ CHEST WALL SBSQ: CPT

## 2021-09-08 PROCEDURE — 80069 RENAL FUNCTION PANEL: CPT | Performed by: NURSE PRACTITIONER

## 2021-09-08 PROCEDURE — 250N000009 HC RX 250: Performed by: INTERNAL MEDICINE

## 2021-09-08 PROCEDURE — 250N000013 HC RX MED GY IP 250 OP 250 PS 637: Performed by: NURSE PRACTITIONER

## 2021-09-08 PROCEDURE — 250N000013 HC RX MED GY IP 250 OP 250 PS 637

## 2021-09-08 PROCEDURE — 99232 SBSQ HOSP IP/OBS MODERATE 35: CPT | Performed by: INTERNAL MEDICINE

## 2021-09-08 PROCEDURE — 250N000013 HC RX MED GY IP 250 OP 250 PS 637: Performed by: INTERNAL MEDICINE

## 2021-09-08 PROCEDURE — 120N000001 HC R&B MED SURG/OB

## 2021-09-08 PROCEDURE — 99232 SBSQ HOSP IP/OBS MODERATE 35: CPT | Performed by: NURSE PRACTITIONER

## 2021-09-08 PROCEDURE — 999N000157 HC STATISTIC RCP TIME EA 10 MIN

## 2021-09-08 PROCEDURE — 250N000013 HC RX MED GY IP 250 OP 250 PS 637: Performed by: HOSPITALIST

## 2021-09-08 PROCEDURE — 250N000011 HC RX IP 250 OP 636

## 2021-09-08 PROCEDURE — 999N000009 HC STATISTIC AIRWAY CARE

## 2021-09-08 PROCEDURE — 36415 COLL VENOUS BLD VENIPUNCTURE: CPT | Performed by: NURSE PRACTITIONER

## 2021-09-08 PROCEDURE — 94640 AIRWAY INHALATION TREATMENT: CPT | Mod: 76

## 2021-09-08 PROCEDURE — 94640 AIRWAY INHALATION TREATMENT: CPT

## 2021-09-08 PROCEDURE — 70450 CT HEAD/BRAIN W/O DYE: CPT

## 2021-09-08 RX ORDER — LIDOCAINE HYDROCHLORIDE 10 MG/ML
INJECTION, SOLUTION EPIDURAL; INFILTRATION; INTRACAUDAL; PERINEURAL
Status: DISCONTINUED
Start: 2021-09-08 | End: 2021-09-08 | Stop reason: WASHOUT

## 2021-09-08 RX ADMIN — Medication 2 PACKET: at 13:25

## 2021-09-08 RX ADMIN — HEPARIN SODIUM 5000 UNITS: 5000 INJECTION, SOLUTION INTRAVENOUS; SUBCUTANEOUS at 13:25

## 2021-09-08 RX ADMIN — VENLAFAXINE 50 MG: 25 TABLET ORAL at 13:24

## 2021-09-08 RX ADMIN — ANORECTAL OINTMENT: 15.7; .44; 24; 20.6 OINTMENT TOPICAL at 13:25

## 2021-09-08 RX ADMIN — ANORECTAL OINTMENT: 15.7; .44; 24; 20.6 OINTMENT TOPICAL at 22:10

## 2021-09-08 RX ADMIN — ACETYLCYSTEINE 2 ML: 200 SOLUTION ORAL; RESPIRATORY (INHALATION) at 07:19

## 2021-09-08 RX ADMIN — MIRTAZAPINE 7.5 MG: 7.5 TABLET ORAL at 22:09

## 2021-09-08 RX ADMIN — FAMOTIDINE 20 MG: 20 TABLET, FILM COATED ORAL at 09:43

## 2021-09-08 RX ADMIN — VENLAFAXINE 50 MG: 25 TABLET ORAL at 09:43

## 2021-09-08 RX ADMIN — Medication 2 PACKET: at 22:09

## 2021-09-08 RX ADMIN — ACETYLCYSTEINE 2 ML: 200 SOLUTION ORAL; RESPIRATORY (INHALATION) at 20:34

## 2021-09-08 RX ADMIN — HEPARIN SODIUM 5000 UNITS: 5000 INJECTION, SOLUTION INTRAVENOUS; SUBCUTANEOUS at 05:24

## 2021-09-08 RX ADMIN — ARMODAFINIL 150 MG: 150 TABLET ORAL at 05:24

## 2021-09-08 RX ADMIN — VENLAFAXINE 50 MG: 25 TABLET ORAL at 16:23

## 2021-09-08 RX ADMIN — FAMOTIDINE 20 MG: 20 TABLET, FILM COATED ORAL at 22:09

## 2021-09-08 RX ADMIN — IPRATROPIUM BROMIDE AND ALBUTEROL SULFATE 3 ML: 2.5; .5 SOLUTION RESPIRATORY (INHALATION) at 20:34

## 2021-09-08 RX ADMIN — AMLODIPINE BESYLATE 10 MG: 10 TABLET ORAL at 09:43

## 2021-09-08 RX ADMIN — Medication 2 PACKET: at 09:45

## 2021-09-08 RX ADMIN — IPRATROPIUM BROMIDE AND ALBUTEROL SULFATE 3 ML: 2.5; .5 SOLUTION RESPIRATORY (INHALATION) at 07:19

## 2021-09-08 RX ADMIN — ANORECTAL OINTMENT: 15.7; .44; 24; 20.6 OINTMENT TOPICAL at 09:44

## 2021-09-08 RX ADMIN — HEPARIN SODIUM 5000 UNITS: 5000 INJECTION, SOLUTION INTRAVENOUS; SUBCUTANEOUS at 22:08

## 2021-09-08 ASSESSMENT — MIFFLIN-ST. JEOR: SCORE: 1504.92

## 2021-09-08 NOTE — PROGRESS NOTES
RENAL PROGRESS NOTE    HPI: 70 year old male with past medical history of hypertension, admitted with subarachnoid hemorrhage after a fall on 5/15/2021 s/p emergent left external ventricular drain placement with posterior communicating artery aneurysm defied structured underwent craniotomy with clipping of aneurysm , complicated by persistent cerebral vasospasms and bilateral BAYLEE infarct.  Course also complicated by respiratory failure with intubation pneumonia was treated with cefepime and Vanco, transferred to LT[facility] on 6/11/2021.  Nephrology consulted for hypercalcemia on 8/15/2021     Hypercalcemia: moderate and stable d/t relative immobility with prolonged hospitalization, and CKD/reduced clearance  Corrected Ca 11-12s  PTH has remained on the low side on multiple checks, vitamin D level 30 8/15/2021  No history suggestive of any lymphoma or lymphadenopathy or any granulomatosis related disorders to suggest any additional etiology.  PLAN:  Avoid Ca/D suppl  Continue free water 250cc q 4  Lower Ca TF  Recommend checking twice weekly    HyperK:  Now normalized    Volume:  Appears euvolemic despite net +30L by I/Os.  Holding on diuretics with persistent high sodium.  Weight ranging 69-71kg    CKD 3:  With baseline crea 0.9, 24 hour cr clearance estimates GFR at 45     Hypertension: BP had stable on single agent lisinopril 40 mg, changed to amlodipine 10mg with hyperkalemia.  BP controlled on this     Anemia:  hgb trending 10-11s and stable, likely Inflammatory and malnutrition primary , Mild management per hospitalist medicine, ck iron studies and replace if indicated, no obvious bleed      Acute on chronic hypoxemic respiratory failure: trach capped, weaning phase 3 but trach remains d/t copious secretions, keeping a bit on the dry side to promote resp status,   S/p tx ESBL Pseudomonas and Klebsiella pneumonia  Pulmonary following  Currently off antibiotics     Malnutrition dysphagia after CVA - on tube  feeds, adjusted for lower calcium content     SAH w fall sp craniotomy and hematoma evacuation , PCA aneurysm clip  cb BAYLEE stroke ad vasopasm  Now on rehab, speech and OT/PT therapies     Insomnia/mood disorders  On Ritalin and mirtazapine  Management per primary medicine     Metabolic encephalopathy - ongoing           SUBJECTIVE: Patient traveling to leave for CT scan when I arrived to see him.  Laying in bed, non-verbal.  Family apparently worried about him being more lethargic.    OBJECTIVE:  Physical Exam   Temp: 98.2  F (36.8  C) Temp src: Axillary BP: 113/82 Pulse: 93   Resp: 24 SpO2: 95 % O2 Device: None (Room air)    Vitals:    09/04/21 0600 09/07/21 0615 09/08/21 0415   Weight: 71.6 kg (157 lb 12.8 oz) 69.6 kg (153 lb 8 oz) 71.3 kg (157 lb 3.2 oz)     Vital Signs with Ranges  Temp:  [97.6  F (36.4  C)-98.5  F (36.9  C)] 98.2  F (36.8  C)  Pulse:  [] 93  Resp:  [20-26] 24  BP: (113-140)/(82-92) 113/82  SpO2:  [93 %-98 %] 95 %  I/O last 3 completed shifts:  In: 2987 [NG/GT:2987]  Out: 2025 [Urine:2025]      Patient Vitals for the past 72 hrs:   Weight   09/08/21 0415 71.3 kg (157 lb 3.2 oz)   09/07/21 0615 69.6 kg (153 lb 8 oz)       Intake/Output Summary (Last 24 hours) at 8/30/2021 1049  Last data filed at 8/30/2021 0519  Gross per 24 hour   Intake 3693 ml   Output 400 ml   Net 3293 ml       PHYSICAL EXAM:  General - Alert, he is non-verbal, sitting up in bed, waving mitts  Cardiovascular - Regular rate and rhythm , BP controlled   Respiratory -trach capped, on RA, lungscourse, sats good  On RA  Extremities - No lower extremity edema bilaterally  Neuro:  Exam limited, doesn't follow commantds non-verbal, moving exgtremities  GI:  TF infusing  MSK:  Grossly intact, but global debit.   Psych:  flat affect      LABORATORY STUDIES:     Recent Labs   Lab 09/06/21  0700   WBC 9.6   RBC 3.63*   HGB 11.5*   HCT 36.1*          Basic Metabolic Panel:  Recent Labs   Lab 09/08/21  0629 09/07/21  0641  09/06/21  1649 09/06/21  0659 09/05/21  0648 09/04/21  0707    146* 146* 147* 142 142   POTASSIUM 4.0 4.6 4.7 5.4* 4.7 4.9   CHLORIDE 104 107 104 105 102 102   CO2 28 30 30 31 29 32*   BUN 43* 49* 51* 49* 44* 43*   CR 0.83 0.85 0.87 0.98 0.89 0.88    116 114 123 119 114   RAGINI 11.1* 11.1* 11.1* 11.7* 11.0* 11.1*       INRNo lab results found in last 7 days.     Recent Labs   Lab Test 09/06/21  0700 08/30/21  0634 05/16/21  0405 05/15/21  2125 05/15/21  1222   INR  --   --   --  1.20* 1.16*   WBC 9.6 7.1   < > 12.3* 16.5*   HGB 11.5* 10.5*   < > 11.8* 13.6    340   < > 239 257    < > = values in this interval not displayed.       Personally reviewed current labs    Cortez Dent  [facility]  [phone]

## 2021-09-08 NOTE — PLAN OF CARE
"  Problem: Device-Related Complication Risk (Artificial Airway)  Goal: Optimal Device Function  Outcome: No Change     Problem: Skin and Tissue Injury (Artificial Airway)  Goal: Absence of Device-Related Skin or Tissue Injury  Outcome: No Change     Problem: Communication Impairment (Artificial Airway)  Goal: Effective Communication  Outcome: Improving   RT PROGRESS NOTE     DATA:     CURRENT SETTINGS:             TRACH TYPE / SIZE:  #6 bivona changed 8/29             MODE:   capped on RA              ACTION:             THERAPIES:   duo neb and mucomyst bid, vest bid             SUCTION:                           FREQUENCY:   x2                        AMOUNT:   large x1, copious x1                        CONSISTENCY:   thick tenacious                        COLOR:   white to yellow             SPONTANEOUS COUGH EFFORT/STRENGTH OF EFFORT (not elicited by suctioning): moderate                              WEANING PHASE:   3                        WEAN MODE:    capped on RA continuous                                                   RESPONSE:             BS:   diminished and coarse             VITAL SIGNS:   Blood pressure 129/80, pulse 92, temperature 97.6  F (36.4  C), temperature source Oral, resp. rate 26, height 1.803 m (5' 10.98\"), weight 71.3 kg (157 lb 3.2 oz), SpO2 97 %.                 EMOTIONAL NEEDS / CONCERNS:  no                RISK FOR SELF DECANNULATION:  yes                        RISK DUE TO:  Impulsive, confused                        INTERVENTION/S IN PLACE IS/ARE:  Bilateral mitts       NOTE / PLAN:   Continue to support.    "

## 2021-09-08 NOTE — PLAN OF CARE
Problem: Adult Inpatient Plan of Care  Goal: Patient-Specific Goal (Individualized)  Outcome: Improving   Patient slept well last night 6-7 hrs. Patient was alert, responding well and cooperative to cares. Will continue to monitor.

## 2021-09-08 NOTE — PROGRESS NOTES
"Pulmonary Medicine Follow up Note - Ventilator Rounds:    Clinical status discussed today with  respiratory therapist.    Chief complaint: Ongoing respiratory failure  Significant change in clinical status during past 24 hours? No      Resp: 20    Tracheal secretions: x 3 last night  moderate to large    Current phase of ventilator weaning pathway:  Phase 3 since 8/31  Has copious amount of secretions      Physical exam     /82 (BP Location: Left arm)   Pulse 99   Temp 98.2  F (36.8  C) (Axillary)   Resp 20   Ht 1.803 m (5' 10.98\")   Wt 71.3 kg (157 lb 3.2 oz)   SpO2 94%   BMI 21.93 kg/m      Gen: tracks intermittently,  up in chair  HEENT: AT/NC. Trach in place. Capped up in chair   Lungs: diminished ant otherwise clear  CV: regular, no murmurs or gallops appreciated  Abdomen: soft, NT, BS wnl  Ext: no edema  Neuro: awake, tracking intermittently, smiles. Leans to his Right.          Diagnosis:     Patient is a 71 yo man with history of hypertension. Patient initially presented on 15-May-2021 to [facility] after being found unresponsive by his wife. Patient was then found to have a subarachnoid hemorrhage secondary to a ruptured aneurysm. Patient was intubated and was started on mannitol, IV antihypertensives and hyperventilation and patient was then transferred to [facility]. Patient underwent aneurysm clipping as well as placement of extra-ventricular drain from hydrocephalus on 15-May-2021. Extra-ventricular drain would malfunction and would require replacement on 04-Jun-2021 and 06-Jun-2021. Extra-ventricular drain reportedly was removed on 09-Jun-2021. Patient had intra-arterial vasospasm treatment with verapamil on 28-May-2021.  Patient was extubated on 16-May-2021 but had to be reintubated on 19-May-2021. Patient would be extubated on 03-Jun-2021 but would be reintubated on 06-Jun-2021. Patient had tracheostomy and PEG-tube placed on 07-Jun-2021. Patient " required treatment for hospital-acquired pneumonia. Patient was transferred to LT[facility] on 11-Jun-2021.    1. Acute respiratory failure s/p trach 6/7/2021  2. S/p treatment pseudomonas ESBL and klebsiella pneumonia   3. Encephalopathy   4. Subarachnoid bleed s/p craniotomy and clipping P-comm rupture aneurysm.   5. HTN  6. Malnourishment   7. Dysphagia s/p G tube    Recommendations/Plans (MDM):  1. Weaning orders: phase 3  Will not decannulate yet, copious amount of secretions per RT  2. Trach change? 8/29  Due routine changes and cares   3. Diagnostic testing? no  4. Continue pulmonary toiletting, scheduled ipratropium-albuterol, acetylcysteine, metaneb.       Charity Beasley, [facility]/CC

## 2021-09-08 NOTE — PLAN OF CARE
Problem: Device-Related Complication Risk (Artificial Airway)  Goal: Optimal Device Function  Outcome: No Change   Respiratory Care Progress Note    Airway/oxygen:    Current settings: capped, room air  Trach type/size: #6 Bivona placed on 8/31/2021    Actions:    Medications/therapies: Duoneb BID, Mucomyst BID, chest vest BID  Overnight suctioning   Frequency: 3x   Amount: moderate   Consistency: thick   Color: cloudy, white   Spontaneous effort: strong, productive    Weaning phase: 3  Daytime plan: capped, room air  Nighttime plan: capped, room air    Response    Breath sounds: diminished, rhonchi  Vital signs: Temp: 97.6  F (36.4  C) Temp src: Oral BP: 122/88 Pulse: 97   Resp: 22 SpO2: 94 % O2 Device: None (Room air)    Risk for self decannulation: yes   Risk due to: confusion   Intervention(s) in place: mitts bilateral hands    Note/Plan: continue plan of care

## 2021-09-08 NOTE — PROGRESS NOTES
Patient wife called,asked the status of the patient and expressed her concern that he is more sleepy this days,staff is not concerned. This writer updated the status of the patient also explained that he is awake and working with therapy at this time.Wife continue c/o that patient is not getting good cares and want to talk with manger. Call redirected to the manager and MD notified the concern.. Ipad was not working from yesterday but fixed and working at this time. All the status explained via ipad to wife and daughter again.

## 2021-09-08 NOTE — PROGRESS NOTES
Social Work Note:  Patient chart reviewed.  Patient discussed in morning rounds.  Barriers to discharge: mitts x2. Frequent suction x4 (large/copious).  Trach in place.     It is likely patient will require placement at the time of discharge from [facility]. Patient will likley require SNF/TCU placement.  Patient can not discharge to SNF with mitts x2 (restraint) in place.      Patient's exact discharge date, time, disposition TBD.  SW will continue to follow for psychosocial and emotional support of patient and family. SW to facilitate discharge to SNF/TCU when pt no longer requires [facility] level of care.        Dayron Chen, [facility] HospitalMARY KATE  [facility]  [phone]

## 2021-09-08 NOTE — PROGRESS NOTES
[facility]    Medicine Progress Note - Hospitalist Service       Date of Admission:  6/11/2021    Summary:  Dayron Neri is a 70 year male with h/o HTN who presented to ED on 5/15/2021 with acute SAH after a fall with unresponsiveness.  He underwent emergent left external ventricular drain placement.  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  On 5/28/2021, patient had persistent cerebral vasospasm, CT head showed bilateral BAYLEE infarct and was treated with milrinone and verapamil.  On 6/1/2021, angiogram showed no evidence of vasospasm.  Extubated on 6/3/2021.  6/6/2021 he was reintubated due to hypoxia.  On 6/7/2021, patient underwent tracheostomy and PEG tube placement.  EVD removed 6/9/2021.   Patient was treated for possible pneumonia with Unasyn on 5/20/2021, It was switched to ceftriaxone the next day for sputum culture growing Klebsiella. On 5/23, sputum culture also grew Pseudomonas. Antibiotic was switched to Zosyn.  Due to increased WBCs in CSF, possibility of ventriculitis was raised. CSF culture was negative. Antibiotic was switched to cefepime and vancomycin on 5/28/2021 for 2 weeks.  Vanco was discontinued on 6/9/2021. On 6/5/2021, sputum grew ESBL so cefepime was switched to meropenem. He was transferred to [facility] on 6/11/21.     Since admission to [facility], patient did not make significant progression for his neurology status. Trach weaning has been very slow due to his cognitive impairment.       Assessment & Plan      Acute on chronic hypoxemic respiratory failure: S/p treatment pseudomonas ESBL and Klebsiella pneumonia. Has excessive secretions. Weaning has been slow. Per pulmonary, his cognition prevents him from improving further. His pulmonary status could improve more if his cognition improves.   - Has been capped on room air since 8/31. Per pulmonary, no plan for decannulation due to excessive secretions.   -  "Continue to wean per RT and pulmonary.   - Airway secretion: Glycopyrrolate was tried and stopped. 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 Last CT head done on 7/1/21, which shows resolution of previous multicompartment hemorrhage in the right frontal lobe and the ventricles.   - Need follow up with Dr. Martinez with Neurosurgery in 3 months with repeat CTA of head and neck for post-op f/u. Next CTA of head and neck mid September.   -Given patient is somnolent/lethargic today, will order CT head.    Acute metabolic encephalopathy/cognitive impairment: His current neurology status is likely his new baseline due to frontal lobe injury.  Wife is still hopeful he will improve further but it is unclear if he has the capacity to focus and learn what they are attempting to teach him during therapy.  - Nuvigil started on 8/27 per neurology recommendation  - Patient continues to pull his IV lines and trach, which requires bilateral mitten restraint. Per nursing staff, patient has hallucination. Psy following.    - Continue Mirtazapine,Venlafaxine. ,     Dysphagia: On tube feeds and NPO.   - Speech therapy following. Per note from 8/17/21, despite several weeks of working with patient there has been \"no functional improvement in patient's ability to manage his oropharyngeal secretions.\" Speech discussed this with wife. Intensive dysphagia treatment has been discontinued.     Severe malnutrition: has moderate/severe subcutaneous fat loss, moderate/severe muscle loss  - continuous tube feeding per dietician     Chronic kidney disease (CKD) stage 3a: Creatinine has been stable.    Anemia: hemoglobin stable. Continue to monitor.    Essential hypertension: BP controlled " currently. Some high readings noted yest. . On amlodipine.    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 Sputum culture 6/19 showed MDR pseudomonas. On Tobramycin nebs 6/24/2021 - 7/9/2021.   - Continue pulmonary toilet, scheduled duonebs    Ventilator associated pneumonia: Completed antibiotics on July 18.    Severe debility, weakness, critical illness, deconditioning: Continue PT/OT    Hypercalcemia: unclear etiology, mild at this point. Parathyroid hormone normal. May be related to relative immobilization. Continue to monitor. Appreciate renal input.    Insomnia: Continue nighttime mirtazapine 7.5 mg scheduled.    Left inguinal hernia: US on 6/22/21 shows large left inguinal hernia containing mesenteric fat and bowel, extending to the superior scrotum, no evidence of intratesticular mass. Currently has no acute issue and no intervention indicated.       Diet: Adult Formula Drip Feeding: Continuous Novasource Renal; Gastrostomy; Goal Rate: 65; mL/hr; Medication - Feeding Tube Flush Frequency: At least 15-30 mL water before and after medication administration and with tube clogging; Amount to Send (Nutri...    DVT Prophylaxis: Heparin SQ  Gutierrez Catheter: Not present  Central Lines: None  Code Status: Full Code      Disposition Plan   Expected discharge: to be determined    The patient's care was discussed with RN, MARY KATE/KT. Updated patients wife over the phone.     Kim Knox MD  Hospitalist Service  [facility]      ______________________________________________________________________    Interval History    Chart reviewed and events noted.  Patient seen and examined.   Very sleepy. Hard to arouse and doesn't answer much.     Physical Exam   Vital Signs: Temp: 98.2  F (36.8  C) Temp src: Axillary BP: 113/82 Pulse: 93   Resp: 24 SpO2: 95 % O2 Device: None (Room air)    Weight: 157 lbs 3.2 oz    General appearance: Not in acute distress  HEENT:  Trach in  place.  Lungs: Clear breath sounds in bilateral lung fields  Cardiovascular: Regular rate and rhythm, normal S1-S2  Abdomen: Soft, non tender, no distension  Neurology: Somnolent, hard to arouse.       Data   Recent Labs   Lab 09/08/21  0629 09/07/21  0641 09/06/21  1649 09/06/21  0700   WBC  --   --   --  9.6   HGB  --   --   --  11.5*   MCV  --   --   --  99   PLT  --   --   --  314    146* 146*  --    POTASSIUM 4.0 4.6 4.7  --    CHLORIDE 104 107 104  --    CO2 28 30 30  --    BUN 43* 49* 51*  --    CR 0.83 0.85 0.87  --    ANIONGAP 12 9 12  --    RAGINI 11.1* 11.1* 11.1*  --     116 114  --    ALBUMIN 3.2* 3.1*  --   --        Restraint:    Patient pulls his IV line and trach.     Within an hour after restraint an in person face to face assessment was completed, including an evaluation of the patient's immediate reaction to the intervention, behavioral assessment and review/assessment of history, drugs and medications, recent labs, etc., and behavioral condition.  The patient experienced: No adverse physical outcome from seclusion/restraint initiation.  The intervention of restraint or seclusion needs to continue.

## 2021-09-09 ENCOUNTER — APPOINTMENT (OUTPATIENT)
Dept: PHYSICAL THERAPY | Facility: CLINIC | Age: 71
DRG: 207 | End: 2021-09-09
Attending: HOSPITALIST
Payer: COMMERCIAL

## 2021-09-09 ENCOUNTER — APPOINTMENT (OUTPATIENT)
Dept: OCCUPATIONAL THERAPY | Facility: CLINIC | Age: 71
DRG: 207 | End: 2021-09-09
Attending: HOSPITALIST
Payer: COMMERCIAL

## 2021-09-09 ENCOUNTER — APPOINTMENT (OUTPATIENT)
Dept: SPEECH THERAPY | Facility: CLINIC | Age: 71
DRG: 207 | End: 2021-09-09
Attending: HOSPITALIST
Payer: COMMERCIAL

## 2021-09-09 LAB
ALBUMIN SERPL-MCNC: 3.2 G/DL (ref 3.5–5)
ANION GAP SERPL CALCULATED.3IONS-SCNC: 13 MMOL/L (ref 5–18)
BUN SERPL-MCNC: 46 MG/DL (ref 8–22)
CALCIUM SERPL-MCNC: 11 MG/DL (ref 8.5–10.5)
CHLORIDE BLD-SCNC: 104 MMOL/L (ref 98–107)
CO2 SERPL-SCNC: 28 MMOL/L (ref 22–31)
CREAT SERPL-MCNC: 0.86 MG/DL (ref 0.7–1.3)
GFR SERPL CREATININE-BSD FRML MDRD: 89 ML/MIN/1.73M2
GLUCOSE BLD-MCNC: 117 MG/DL (ref 70–125)
PHOSPHATE SERPL-MCNC: 3.6 MG/DL (ref 2.5–4.5)
POTASSIUM BLD-SCNC: 4.2 MMOL/L (ref 3.5–5)
SODIUM SERPL-SCNC: 145 MMOL/L (ref 136–145)

## 2021-09-09 PROCEDURE — 999N000009 HC STATISTIC AIRWAY CARE

## 2021-09-09 PROCEDURE — 250N000013 HC RX MED GY IP 250 OP 250 PS 637: Performed by: NURSE PRACTITIONER

## 2021-09-09 PROCEDURE — 250N000009 HC RX 250: Performed by: INTERNAL MEDICINE

## 2021-09-09 PROCEDURE — 120N000001 HC R&B MED SURG/OB

## 2021-09-09 PROCEDURE — 36415 COLL VENOUS BLD VENIPUNCTURE: CPT | Performed by: NURSE PRACTITIONER

## 2021-09-09 PROCEDURE — 94640 AIRWAY INHALATION TREATMENT: CPT | Mod: 76

## 2021-09-09 PROCEDURE — 250N000013 HC RX MED GY IP 250 OP 250 PS 637: Performed by: INTERNAL MEDICINE

## 2021-09-09 PROCEDURE — 97530 THERAPEUTIC ACTIVITIES: CPT | Mod: GO | Performed by: OCCUPATIONAL THERAPIST

## 2021-09-09 PROCEDURE — 999N000197 HC STATISTIC WOC PT EDUCATION, 0-15 MIN

## 2021-09-09 PROCEDURE — 250N000011 HC RX IP 250 OP 636

## 2021-09-09 PROCEDURE — 97530 THERAPEUTIC ACTIVITIES: CPT | Mod: GP | Performed by: PHYSICAL THERAPIST

## 2021-09-09 PROCEDURE — 250N000013 HC RX MED GY IP 250 OP 250 PS 637

## 2021-09-09 PROCEDURE — 94640 AIRWAY INHALATION TREATMENT: CPT

## 2021-09-09 PROCEDURE — 999N000157 HC STATISTIC RCP TIME EA 10 MIN

## 2021-09-09 PROCEDURE — 92507 TX SP LANG VOICE COMM INDIV: CPT | Mod: GN | Performed by: SPEECH-LANGUAGE PATHOLOGIST

## 2021-09-09 PROCEDURE — 80069 RENAL FUNCTION PANEL: CPT | Performed by: NURSE PRACTITIONER

## 2021-09-09 PROCEDURE — 94669 MECHANICAL CHEST WALL OSCILL: CPT

## 2021-09-09 PROCEDURE — 94668 MNPJ CHEST WALL SBSQ: CPT

## 2021-09-09 PROCEDURE — 97112 NEUROMUSCULAR REEDUCATION: CPT | Mod: GP | Performed by: PHYSICAL THERAPIST

## 2021-09-09 PROCEDURE — 250N000013 HC RX MED GY IP 250 OP 250 PS 637: Performed by: HOSPITALIST

## 2021-09-09 PROCEDURE — 99233 SBSQ HOSP IP/OBS HIGH 50: CPT | Performed by: INTERNAL MEDICINE

## 2021-09-09 RX ADMIN — ANORECTAL OINTMENT: 15.7; .44; 24; 20.6 OINTMENT TOPICAL at 14:28

## 2021-09-09 RX ADMIN — ACETYLCYSTEINE 2 ML: 200 SOLUTION ORAL; RESPIRATORY (INHALATION) at 07:34

## 2021-09-09 RX ADMIN — Medication 2 PACKET: at 21:01

## 2021-09-09 RX ADMIN — ANORECTAL OINTMENT: 15.7; .44; 24; 20.6 OINTMENT TOPICAL at 21:01

## 2021-09-09 RX ADMIN — FAMOTIDINE 20 MG: 20 TABLET, FILM COATED ORAL at 08:37

## 2021-09-09 RX ADMIN — VENLAFAXINE 50 MG: 25 TABLET ORAL at 14:27

## 2021-09-09 RX ADMIN — Medication 2 PACKET: at 14:27

## 2021-09-09 RX ADMIN — FAMOTIDINE 20 MG: 20 TABLET, FILM COATED ORAL at 21:01

## 2021-09-09 RX ADMIN — HEPARIN SODIUM 5000 UNITS: 5000 INJECTION, SOLUTION INTRAVENOUS; SUBCUTANEOUS at 14:27

## 2021-09-09 RX ADMIN — VENLAFAXINE 50 MG: 25 TABLET ORAL at 17:54

## 2021-09-09 RX ADMIN — AMLODIPINE BESYLATE 10 MG: 10 TABLET ORAL at 08:38

## 2021-09-09 RX ADMIN — ARMODAFINIL 150 MG: 150 TABLET ORAL at 05:44

## 2021-09-09 RX ADMIN — SCOPALAMINE 1 PATCH: 1 PATCH, EXTENDED RELEASE TRANSDERMAL at 14:28

## 2021-09-09 RX ADMIN — IPRATROPIUM BROMIDE AND ALBUTEROL SULFATE 3 ML: 2.5; .5 SOLUTION RESPIRATORY (INHALATION) at 07:34

## 2021-09-09 RX ADMIN — VENLAFAXINE 50 MG: 25 TABLET ORAL at 08:37

## 2021-09-09 RX ADMIN — IPRATROPIUM BROMIDE AND ALBUTEROL SULFATE 3 ML: 2.5; .5 SOLUTION RESPIRATORY (INHALATION) at 19:48

## 2021-09-09 RX ADMIN — ANORECTAL OINTMENT: 15.7; .44; 24; 20.6 OINTMENT TOPICAL at 08:42

## 2021-09-09 RX ADMIN — MIRTAZAPINE 7.5 MG: 7.5 TABLET ORAL at 21:01

## 2021-09-09 RX ADMIN — Medication 2 PACKET: at 08:42

## 2021-09-09 RX ADMIN — ACETYLCYSTEINE 2 ML: 200 SOLUTION ORAL; RESPIRATORY (INHALATION) at 19:48

## 2021-09-09 RX ADMIN — HEPARIN SODIUM 5000 UNITS: 5000 INJECTION, SOLUTION INTRAVENOUS; SUBCUTANEOUS at 05:44

## 2021-09-09 RX ADMIN — HEPARIN SODIUM 5000 UNITS: 5000 INJECTION, SOLUTION INTRAVENOUS; SUBCUTANEOUS at 21:02

## 2021-09-09 ASSESSMENT — MIFFLIN-ST. JEOR: SCORE: 1501.75

## 2021-09-09 NOTE — PLAN OF CARE
RT PROGRESS NOTE     DATA:     CURRENT SETTINGS:             TRACH TYPE / SIZE:  6 Bivona placed 8/29/21             MODE:   Capped/RA     ACTION:             THERAPIES:   BID Duo neb and Mucomyst neb given during BID Vest treatment, x 15 min.             SUCTION:  Orally sxnd x 2, for large, thick, white                         FREQUENCY: X 1                        AMOUNT:   mod                        CONSISTENCY:   thick, lavaged                        COLOR:   white             SPONTANEOUS COUGH EFFORT/STRENGTH OF EFFORT (not elicited by suctioning): Strong/Loose                WEANING PHASE:   3    RESPONSE:             BS:   Dim             VITAL SIGNS:   SATs 95-97%, HR 91, RR 20-22             RISK FOR SELF DECANNULATION:  Yes             RISK DUE TO:  Confusion             INTERVENTION/S IN PLACE IS/ARE:  Bilateral mitts       NOTE / PLAN:   Continue with same.     Problem: Device-Related Complication Risk (Artificial Airway)  Goal: Optimal Device Function  Outcome: No Change     Problem: Skin and Tissue Injury (Artificial Airway)  Goal: Absence of Device-Related Skin or Tissue Injury  Outcome: No Change     Problem: Communication Impairment (Artificial Airway)  Goal: Effective Communication  Outcome: No Change

## 2021-09-09 NOTE — PLAN OF CARE
Problem: Anxiety  Goal: Anxiety Reduction or Resolution  Outcome: Improving  Intervention: Promote Anxiety Reduction  Recent Flowsheet Documentation  Taken 9/9/2021 0930 by Rosa M Odonnell RN  Family/Support System Care: support provided   Pt alert, oriented to self, no signs of pain noted. Participated in therapy. Condom cath in place = 700ml output. Smear BM x1 at 1700H.   Problem: Restraint for Non-Violent/Non-Self-Destructive Behavior  Goal: Prevent/Manage Potential Problems  Description: Maintain safety of patient and others during period of restraint.  Promote psychological and physical wellbeing.  Prevent injury to skin and involved body parts.  Promote nutrition, hydration, and elimination.  Outcome: Improving   Mitts x 2 trialed discontinue at 1418H per wife's request, pt not noted to be grabbing on lines since then. Got him up in the chair 2x today. Will monitor need for restraint.

## 2021-09-09 NOTE — PROGRESS NOTES
[facility]    Medicine Progress Note - Hospitalist Service       Date of Admission:  6/11/2021    Summary:  Dayron Neri is a 70 year male with h/o HTN who presented to ED on 5/15/2021 with acute SAH after a fall with unresponsiveness.  He underwent emergent left external ventricular drain placement.  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  On 5/28/2021, patient had persistent cerebral vasospasm, CT head showed bilateral BAYLEE infarct and was treated with milrinone and verapamil.  On 6/1/2021, angiogram showed no evidence of vasospasm.  Extubated on 6/3/2021.  6/6/2021 he was reintubated due to hypoxia.  On 6/7/2021, patient underwent tracheostomy and PEG tube placement.  EVD removed 6/9/2021.   Patient was treated for possible pneumonia with Unasyn on 5/20/2021, It was switched to ceftriaxone the next day for sputum culture growing Klebsiella. On 5/23, sputum culture also grew Pseudomonas. Antibiotic was switched to Zosyn.  Due to increased WBCs in CSF, possibility of ventriculitis was raised. CSF culture was negative. Antibiotic was switched to cefepime and vancomycin on 5/28/2021 for 2 weeks.  Vanco was discontinued on 6/9/2021. On 6/5/2021, sputum grew ESBL so cefepime was switched to meropenem. He was transferred to [facility] on 6/11/21.     Since admission to [facility], patient did not make significant progression for his neurology status. Trach weaning has been very slow due to his cognitive impairment.       9/9 :    Respiratory status stable  Neuro status stable  No other new issues noted today    Will continue to follow        Assessment & Plan      Acute on chronic hypoxemic respiratory failure: S/p treatment pseudomonas ESBL and Klebsiella pneumonia. Has excessive secretions. Weaning has been slow. Per pulmonary, his cognition prevents him from improving further. His pulmonary status could improve more if his cognition improves.  "  - Has been capped on room air since 8/31. Per pulmonary, no plan for decannulation due to excessive secretions.   - Continue to wean per RT and pulmonary.   - Airway secretion: Glycopyrrolate was tried and stopped. 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 Last CT head done on 7/1/21, which shows resolution of previous multicompartment hemorrhage in the right frontal lobe and the ventricles.   - Need follow up with Dr. Martinez with Neurosurgery in 3 months with repeat CTA of head and neck for post-op f/u. Next CTA of head and neck mid September.   -Given patient is somnolent/lethargic today, will order CT head.    Acute metabolic encephalopathy/cognitive impairment: His current neurology status is likely his new baseline due to frontal lobe injury.  Wife is still hopeful he will improve further but it is unclear if he has the capacity to focus and learn what they are attempting to teach him during therapy.  - Nuvigil started on 8/27 per neurology recommendation  - Patient continues to pull his IV lines and trach, which requires bilateral mitten restraint. Per nursing staff, patient has hallucination. Psy following.    - Continue Mirtazapine,Venlafaxine. ,     Dysphagia: On tube feeds and NPO.   - Speech therapy following. Per note from 8/17/21, despite several weeks of working with patient there has been \"no functional improvement in patient's ability to manage his oropharyngeal secretions.\" Speech discussed this with wife. Intensive dysphagia treatment has been discontinued.     Severe malnutrition: has moderate/severe subcutaneous fat loss, moderate/severe muscle loss  - continuous tube feeding per dietician     Chronic kidney disease (CKD) stage 3a: " Creatinine has been stable.    Anemia: hemoglobin stable. Continue to monitor.    Essential hypertension: BP controlled currently. Some high readings noted yest. . On amlodipine.    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 Sputum culture 6/19 showed MDR pseudomonas. On Tobramycin nebs 6/24/2021 - 7/9/2021.   - Continue pulmonary toilet, scheduled duonebs    Ventilator associated pneumonia: Completed antibiotics on July 18.    Severe debility, weakness, critical illness, deconditioning: Continue PT/OT    Hypercalcemia: unclear etiology, mild at this point. Parathyroid hormone normal. May be related to relative immobilization. Continue to monitor. Appreciate renal input.    Insomnia: Continue nighttime mirtazapine 7.5 mg scheduled.    Left inguinal hernia: US on 6/22/21 shows large left inguinal hernia containing mesenteric fat and bowel, extending to the superior scrotum, no evidence of intratesticular mass. Currently has no acute issue and no intervention indicated.       Diet: Adult Formula Drip Feeding: Continuous Novasource Renal; Gastrostomy; Goal Rate: 65; mL/hr; Medication - Feeding Tube Flush Frequency: At least 15-30 mL water before and after medication administration and with tube clogging; Amount to Send (Nutri...    DVT Prophylaxis: Heparin SQ  Gutierrez Catheter: Not present  Central Lines: None  Code Status: Full Code      Disposition Plan   Expected discharge: to be determined    The patient's care was discussed with RN, MARY KATE/KT. Updated patients wife over the phone.     Isreal Granda MD  Hospitalist Service  [facility]      ______________________________________________________________________    Physical Exam   Vital Signs: Temp: 97.8  F (36.6  C) Temp src: Axillary BP: 100/72 Pulse: 92   Resp: 20 SpO2: 97 % O2 Device: None (Room air)    Weight: 156 lbs 8 oz    General appearance: Not in acute distress  HEENT:  Trach in place.  Lungs: Clear  breath sounds in bilateral lung fields  Cardiovascular: Regular rate and rhythm, normal S1-S2  Abdomen: Soft, non tender, no distension  Neurology: Somnolent, hard to arouse.       Data   Recent Labs   Lab 09/09/21  0658 09/08/21  0629 09/07/21  0641 09/06/21  0700   WBC  --   --   --  9.6   HGB  --   --   --  11.5*   MCV  --   --   --  99   PLT  --   --   --  314    144 146*  --    POTASSIUM 4.2 4.0 4.6  --    CHLORIDE 104 104 107  --    CO2 28 28 30  --    BUN 46* 43* 49*  --    CR 0.86 0.83 0.85  --    ANIONGAP 13 12 9  --    RAGINI 11.0* 11.1* 11.1*  --     121 116  --    ALBUMIN 3.2* 3.2* 3.1*  --        Restraint:    Patient pulls his IV line and trach.     Within an hour after restraint an in person face to face assessment was completed, including an evaluation of the patient's immediate reaction to the intervention, behavioral assessment and review/assessment of history, drugs and medications, recent labs, etc., and behavioral condition.  The patient experienced: No adverse physical outcome from seclusion/restraint initiation.  The intervention of restraint or seclusion needs to continue.

## 2021-09-09 NOTE — PROGRESS NOTES
[facility] RN Progress Note    Today's Visit:   Patient continues to have circumferential hyperplasia, extending out 0.2cm 8-4 o'clock and up to 0.5cm 4-8 o'clock  No need for tx. Will continue to assess weekly.  Glenda Bo, SAUDN, RN, PHN, HNB-BC, CWOCN

## 2021-09-09 NOTE — PLAN OF CARE
Problem: Mechanical Ventilation  Goal: Patient will maintain patent airway  Outcome: Progressing  Goal: Tracheostomy will be managed safely  Outcome: Progressing    RT PROGRESS NOTE     DATA:     CURRENT SETTINGS:               TRACH TYPE / SIZE: #6 Bivona, placed 8/29/21             MODE:  capped   FIO2:    RA     ACTION:             THERAPIES: Duo/ Mucomyst neb, Vest Tx given on scheduled time             SUCTION:                           FREQUENCY: 2                        AMOUNT: small                        CONSISTENCY: thick                        COLOR:   white/ pale yellow             SPONTANEOUS COUGH EFFORT/STRENGTH OF EFFORT (not elicited by suctioning):  yes/ strong, swallow part of secretions, using Yankauer for small pale yellow secretions.                          WEANING PHASE:   3                        WEAN MODE: capped/ RA                        WEAN TIME: cont as tolerated                        END WEAN REASON:      RESPONSE:             BS:  clear decreased with some rhonchi - clear decreased on R side, clear on L side after Sx              VITAL SIGNS: O2 sat 94-96%, HR 86-97, RR 20             EMOTIONAL NEEDS / CONCERNS:  None               RISK FOR SELF DECANNULATION:  Yes                        RISK DUE TO: Confusion                        INTERVENTION/S IN PLACE IS/ARE: Restraints x2       NOTE / PLAN:  cont current plan of care - cap trach as tolerated.

## 2021-09-09 NOTE — PLAN OF CARE
Problem: Anxiety  Goal: Anxiety Reduction or Resolution  Outcome: Improving     Problem: Restraint for Non-Violent/Non-Self-Destructive Behavior  Goal: Prevent/Manage Potential Problems  Description: Maintain safety of patient and others during period of restraint.  Promote psychological and physical wellbeing.  Prevent injury to skin and involved body parts.  Promote nutrition, hydration, and elimination.  Outcome: Improving     Problem: Communication Impairment (Artificial Airway)  Goal: Effective Communication  Outcome: Improving   Pt is alert and vital signs were fine tonight. However, the mittens restraint continued as ordered as he is still forget and tries to pull on his lines and tube. Otherwise, he slept fine, no signs of anxiety noted. All his scheduled medications on this shift were administered and cares done. Continue to  monitor. Dayron Austin RN.

## 2021-09-09 NOTE — PLAN OF CARE
Problem: Restraint for Non-Violent/Non-Self-Destructive Behavior  Goal: Prevent/Manage Potential Problems  Description: Maintain safety of patient and others during period of restraint.  Promote psychological and physical wellbeing.  Prevent injury to skin and involved body parts.  Promote nutrition, hydration, and elimination.  Outcome: No Change     Problem: Communication Impairment (Artificial Airway)  Goal: Effective Communication  Outcome: No Change     Problem: Device-Related Complication Risk (Artificial Airway)  Goal: Optimal Device Function  Outcome: No Change  Intervention: Optimize Device Care and Function  Recent Flowsheet Documentation  Taken 9/8/2021 1619 by Lakshmi Esqueda RN  Airway Safety Measures:   all equipment/monitors on and audible   suction at bedside   manual resuscitator/mask/valve in room   suction regulator   suction equipment   oxygen flowmeter     Problem: Communication Impairment (Artificial Airway)  Goal: Effective Communication  Outcome: No Change     Patient continued to be trache capped on Room Air with RT following during this shift.  His wife was informed by MD  about the Head CT  result earlier today. Spouse was constantly monitoring patient and Staff members for interactions and frequency of visits to patient's room. He remains impulsive when he is by himself; so bilateral mitt restraints continue to be used for his safety. Will keep monitoring patient's safety and progress.

## 2021-09-10 ENCOUNTER — APPOINTMENT (OUTPATIENT)
Dept: OCCUPATIONAL THERAPY | Facility: CLINIC | Age: 71
DRG: 207 | End: 2021-09-10
Attending: HOSPITALIST
Payer: COMMERCIAL

## 2021-09-10 ENCOUNTER — APPOINTMENT (OUTPATIENT)
Dept: PHYSICAL THERAPY | Facility: CLINIC | Age: 71
DRG: 207 | End: 2021-09-10
Attending: HOSPITALIST
Payer: COMMERCIAL

## 2021-09-10 LAB
ALBUMIN SERPL-MCNC: 3.3 G/DL (ref 3.5–5)
ANION GAP SERPL CALCULATED.3IONS-SCNC: 11 MMOL/L (ref 5–18)
BUN SERPL-MCNC: 49 MG/DL (ref 8–22)
CALCIUM SERPL-MCNC: 11.1 MG/DL (ref 8.5–10.5)
CHLORIDE BLD-SCNC: 104 MMOL/L (ref 98–107)
CO2 SERPL-SCNC: 31 MMOL/L (ref 22–31)
CREAT SERPL-MCNC: 0.86 MG/DL (ref 0.7–1.3)
GFR SERPL CREATININE-BSD FRML MDRD: 89 ML/MIN/1.73M2
GLUCOSE BLD-MCNC: 106 MG/DL (ref 70–125)
HOLD SPECIMEN: NORMAL
MAGNESIUM SERPL-MCNC: 2.1 MG/DL (ref 1.8–2.6)
PHOSPHATE SERPL-MCNC: 3.5 MG/DL (ref 2.5–4.5)
POTASSIUM BLD-SCNC: 4 MMOL/L (ref 3.5–5)
SODIUM SERPL-SCNC: 146 MMOL/L (ref 136–145)

## 2021-09-10 PROCEDURE — 250N000013 HC RX MED GY IP 250 OP 250 PS 637: Performed by: NURSE PRACTITIONER

## 2021-09-10 PROCEDURE — 258N000003 HC RX IP 258 OP 636: Performed by: HOSPITALIST

## 2021-09-10 PROCEDURE — 250N000009 HC RX 250: Performed by: INTERNAL MEDICINE

## 2021-09-10 PROCEDURE — 99233 SBSQ HOSP IP/OBS HIGH 50: CPT | Performed by: INTERNAL MEDICINE

## 2021-09-10 PROCEDURE — 250N000013 HC RX MED GY IP 250 OP 250 PS 637: Performed by: INTERNAL MEDICINE

## 2021-09-10 PROCEDURE — 999N000009 HC STATISTIC AIRWAY CARE

## 2021-09-10 PROCEDURE — 99358 PROLONG SERVICE W/O CONTACT: CPT | Performed by: NURSE PRACTITIONER

## 2021-09-10 PROCEDURE — 97530 THERAPEUTIC ACTIVITIES: CPT | Mod: GO | Performed by: OCCUPATIONAL THERAPIST

## 2021-09-10 PROCEDURE — 94640 AIRWAY INHALATION TREATMENT: CPT | Mod: 76

## 2021-09-10 PROCEDURE — 83735 ASSAY OF MAGNESIUM: CPT | Performed by: INTERNAL MEDICINE

## 2021-09-10 PROCEDURE — 97530 THERAPEUTIC ACTIVITIES: CPT | Mod: GP | Performed by: PHYSICAL THERAPIST

## 2021-09-10 PROCEDURE — 250N000013 HC RX MED GY IP 250 OP 250 PS 637: Performed by: HOSPITALIST

## 2021-09-10 PROCEDURE — 94669 MECHANICAL CHEST WALL OSCILL: CPT

## 2021-09-10 PROCEDURE — 97535 SELF CARE MNGMENT TRAINING: CPT | Mod: GO | Performed by: OCCUPATIONAL THERAPIST

## 2021-09-10 PROCEDURE — 36415 COLL VENOUS BLD VENIPUNCTURE: CPT | Performed by: INTERNAL MEDICINE

## 2021-09-10 PROCEDURE — 120N000001 HC R&B MED SURG/OB

## 2021-09-10 PROCEDURE — 80069 RENAL FUNCTION PANEL: CPT | Performed by: NURSE PRACTITIONER

## 2021-09-10 PROCEDURE — 250N000013 HC RX MED GY IP 250 OP 250 PS 637

## 2021-09-10 PROCEDURE — 999N000157 HC STATISTIC RCP TIME EA 10 MIN

## 2021-09-10 PROCEDURE — 999N000123 HC STATISTIC OXYGEN O2DAILY TECH TIME

## 2021-09-10 PROCEDURE — 99232 SBSQ HOSP IP/OBS MODERATE 35: CPT | Performed by: INTERNAL MEDICINE

## 2021-09-10 PROCEDURE — 94640 AIRWAY INHALATION TREATMENT: CPT

## 2021-09-10 PROCEDURE — 97112 NEUROMUSCULAR REEDUCATION: CPT | Mod: GP | Performed by: PHYSICAL THERAPIST

## 2021-09-10 PROCEDURE — 250N000011 HC RX IP 250 OP 636

## 2021-09-10 RX ORDER — VENLAFAXINE 25 MG/1
25 TABLET ORAL EVERY MORNING
Status: DISCONTINUED | OUTPATIENT
Start: 2021-09-11 | End: 2021-09-27 | Stop reason: HOSPADM

## 2021-09-10 RX ORDER — VENLAFAXINE 25 MG/1
25 TABLET ORAL EVERY MORNING
Status: DISCONTINUED | OUTPATIENT
Start: 2021-09-11 | End: 2021-09-10

## 2021-09-10 RX ADMIN — HEPARIN SODIUM 5000 UNITS: 5000 INJECTION, SOLUTION INTRAVENOUS; SUBCUTANEOUS at 14:49

## 2021-09-10 RX ADMIN — VENLAFAXINE 50 MG: 25 TABLET ORAL at 08:10

## 2021-09-10 RX ADMIN — HEPARIN SODIUM 5000 UNITS: 5000 INJECTION, SOLUTION INTRAVENOUS; SUBCUTANEOUS at 06:19

## 2021-09-10 RX ADMIN — Medication 2 PACKET: at 21:30

## 2021-09-10 RX ADMIN — AMLODIPINE BESYLATE 10 MG: 10 TABLET ORAL at 08:09

## 2021-09-10 RX ADMIN — IPRATROPIUM BROMIDE AND ALBUTEROL SULFATE 3 ML: 2.5; .5 SOLUTION RESPIRATORY (INHALATION) at 19:15

## 2021-09-10 RX ADMIN — Medication 2 PACKET: at 14:49

## 2021-09-10 RX ADMIN — MIRTAZAPINE 7.5 MG: 7.5 TABLET ORAL at 21:24

## 2021-09-10 RX ADMIN — ANORECTAL OINTMENT: 15.7; .44; 24; 20.6 OINTMENT TOPICAL at 14:50

## 2021-09-10 RX ADMIN — VENLAFAXINE 50 MG: 25 TABLET ORAL at 16:37

## 2021-09-10 RX ADMIN — SODIUM CHLORIDE, PRESERVATIVE FREE: 5 INJECTION INTRAVENOUS at 06:19

## 2021-09-10 RX ADMIN — VENLAFAXINE 50 MG: 25 TABLET ORAL at 12:32

## 2021-09-10 RX ADMIN — IPRATROPIUM BROMIDE AND ALBUTEROL SULFATE 3 ML: 2.5; .5 SOLUTION RESPIRATORY (INHALATION) at 07:04

## 2021-09-10 RX ADMIN — ANORECTAL OINTMENT: 15.7; .44; 24; 20.6 OINTMENT TOPICAL at 21:30

## 2021-09-10 RX ADMIN — ANORECTAL OINTMENT: 15.7; .44; 24; 20.6 OINTMENT TOPICAL at 08:09

## 2021-09-10 RX ADMIN — ACETAMINOPHEN ORAL SOLUTION 650 MG: 650 SOLUTION ORAL at 14:19

## 2021-09-10 RX ADMIN — ACETYLCYSTEINE 2 ML: 200 SOLUTION ORAL; RESPIRATORY (INHALATION) at 19:15

## 2021-09-10 RX ADMIN — HEPARIN SODIUM 5000 UNITS: 5000 INJECTION, SOLUTION INTRAVENOUS; SUBCUTANEOUS at 21:24

## 2021-09-10 RX ADMIN — FAMOTIDINE 20 MG: 20 TABLET, FILM COATED ORAL at 21:24

## 2021-09-10 RX ADMIN — Medication 2 PACKET: at 08:14

## 2021-09-10 RX ADMIN — ARMODAFINIL 150 MG: 150 TABLET ORAL at 06:19

## 2021-09-10 RX ADMIN — FAMOTIDINE 20 MG: 20 TABLET, FILM COATED ORAL at 08:10

## 2021-09-10 RX ADMIN — ACETYLCYSTEINE 2 ML: 200 SOLUTION ORAL; RESPIRATORY (INHALATION) at 07:04

## 2021-09-10 NOTE — PROGRESS NOTES
RENAL PROGRESS NOTE    HPI: 70 year old male with past medical history of hypertension, admitted with subarachnoid hemorrhage after a fall on 5/15/2021 s/p emergent left external ventricular drain placement with posterior communicating artery aneurysm defied structured underwent craniotomy with clipping of aneurysm , complicated by persistent cerebral vasospasms and bilateral BAYLEE infarct.  Course also complicated by respiratory failure with intubation pneumonia was treated with cefepime and Vanco, transferred to LT[facility] on 6/11/2021.  Nephrology consulted for hypercalcemia on 8/15/2021     Hypercalcemia: moderate and stable d/t relative immobility with prolonged hospitalization, and CKD/reduced clearance  Corrected Ca ~11  PTH has remained on the low side on multiple checks, vitamin D level 30 8/15/2021  No history suggestive of any lymphoma or lymphadenopathy or any granulomatosis related disorders to suggest any additional etiology.  PLAN:  Avoid Ca/D suppl  Continue free water 250cc q 4 to keep hydrated  Lower Ca TF  Recommend weekly checks unless rising at the moment  Will follow up early next week, please call if needing assistance over the weekend    HyperK:  Now normalized    Volume:  Appears euvolemic despite net +30L by I/Os.  Holding on diuretics with persistent high sodium and weight stable ranging 69-71kg with ZERO edema    CKD 3:  With baseline crea 0.9, 24 hour cr clearance estimates GFR at 45     Hypertension: BP had stable on single agent lisinopril 40 mg, changed to amlodipine 10mg with hyperkalemia.  BP controlled on this     Anemia:  hgb trending 10-11s and stable, likely Inflammatory and malnutrition primary , Mild management per hospitalist medicine, ck iron studies and replace if indicated, no obvious bleed      Acute on chronic hypoxemic respiratory failure: trach capped, weaning phase 3 but trach remains d/t copious secretions, keeping a bit on the dry side to promote resp status,    S/p tx ESBL Pseudomonas and Klebsiella pneumonia  Pulmonary following  Currently off antibiotics     Malnutrition dysphagia after CVA - on tube feeds, adjusted for lower calcium content     SAH w fall sp craniotomy and hematoma evacuation , PCA aneurysm clip  cb BAYLEE stroke ad vasopasm  Now on rehab, speech and OT/PT therapies     Insomnia/mood disorders  On Ritalin and mirtazapine  Management per primary medicine     Metabolic encephalopathy - ongoing           SUBJECTIVE:  Seen in med.  Slightly more interactive but reaching for things that aren't there.  Nonverbal.  Ca stable on the higher side.  Sodium stable on the higher side on current FWF.  Wife on the iPad and I discussed this with them.  Reassured them the potassium problem was resolved.    OBJECTIVE:  Physical Exam   Temp: 98.1  F (36.7  C) Temp src: Axillary BP: 122/86 Pulse: 93   Resp: 24 SpO2: 97 % O2 Device: None (Room air)    Vitals:    09/07/21 0615 09/08/21 0415 09/09/21 0558   Weight: 69.6 kg (153 lb 8 oz) 71.3 kg (157 lb 3.2 oz) 71 kg (156 lb 8 oz)     Vital Signs with Ranges  Temp:  [97.8  F (36.6  C)-98.4  F (36.9  C)] 98.1  F (36.7  C)  Pulse:  [82-94] 93  Resp:  [19-24] 24  BP: (100-126)/(72-86) 122/86  FiO2 (%):  [21 %] 21 %  SpO2:  [91 %-98 %] 97 %  I/O last 3 completed shifts:  In: 1545 [NG/GT:1545]  Out: 1550 [Urine:1550]      Patient Vitals for the past 72 hrs:   Weight   09/09/21 0558 71 kg (156 lb 8 oz)   09/08/21 0415 71.3 kg (157 lb 3.2 oz)       Intake/Output Summary (Last 24 hours) at 8/30/2021 1049  Last data filed at 8/30/2021 0519  Gross per 24 hour   Intake 3693 ml   Output 400 ml   Net 3293 ml       PHYSICAL EXAM:  General - Alert, he is non-verbal, sitting up in bed, reaching with his hand  Cardiovascular - Regular rate and rhythm , BP controlled   Respiratory -trach capped, on RA, lungscourse, sats good  On RA  Extremities - No lower extremity edema bilaterally  Neuro:  Exam limited, doesn't follow commantds non-verbal,  moving exgtremities  GI:  TF infusing  MSK:  Grossly intact, but global debit.   Psych:  flat affect      LABORATORY STUDIES:     Recent Labs   Lab 09/06/21  0700   WBC 9.6   RBC 3.63*   HGB 11.5*   HCT 36.1*          Basic Metabolic Panel:  Recent Labs   Lab 09/10/21  0634 09/09/21  0658 09/08/21  0629 09/07/21  0641 09/06/21  1649 09/06/21  0659   * 145 144 146* 146* 147*   POTASSIUM 4.0 4.2 4.0 4.6 4.7 5.4*   CHLORIDE 104 104 104 107 104 105   CO2 31 28 28 30 30 31   BUN 49* 46* 43* 49* 51* 49*   CR 0.86 0.86 0.83 0.85 0.87 0.98    117 121 116 114 123   RAGINI 11.1* 11.0* 11.1* 11.1* 11.1* 11.7*       INRNo lab results found in last 7 days.     Recent Labs   Lab Test 09/06/21  0700 08/30/21  0634 05/16/21  0405 05/15/21  2125 05/15/21  1222   INR  --   --   --  1.20* 1.16*   WBC 9.6 7.1   < > 12.3* 16.5*   HGB 11.5* 10.5*   < > 11.8* 13.6    340   < > 239 257    < > = values in this interval not displayed.       Personally reviewed current labs    Cortez Dent  [facility]  [phone]

## 2021-09-10 NOTE — PLAN OF CARE
Problem: Anxiety  Goal: Anxiety Reduction or Resolution  Outcome: Improving     Problem: Restraint for Non-Violent/Non-Self-Destructive Behavior  Goal: Prevent/Manage Potential Problems  Description: Maintain safety of patient and others during period of restraint.  Promote psychological and physical wellbeing.  Prevent injury to skin and involved body parts.  Promote nutrition, hydration, and elimination.  Outcome: Improving     Problem: Communication Impairment (Artificial Airway)  Goal: Effective Communication  Outcome: Improving   Pt calm and slept all shift.denies pain.trach capped,labs drawn. Pt not pulling any tubes will mo niter.

## 2021-09-10 NOTE — PROGRESS NOTES
[facility]    Medicine Progress Note - Hospitalist Service       Date of Admission:  6/11/2021    Summary:  Dayron Neri is a 70 year male with h/o HTN who presented to ED on 5/15/2021 with acute SAH after a fall with unresponsiveness.  He underwent emergent left external ventricular drain placement.  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  On 5/28/2021, patient had persistent cerebral vasospasm, CT head showed bilateral BAYLEE infarct and was treated with milrinone and verapamil.  On 6/1/2021, angiogram showed no evidence of vasospasm.  Extubated on 6/3/2021.  6/6/2021 he was reintubated due to hypoxia.  On 6/7/2021, patient underwent tracheostomy and PEG tube placement.  EVD removed 6/9/2021.   Patient was treated for possible pneumonia with Unasyn on 5/20/2021, It was switched to ceftriaxone the next day for sputum culture growing Klebsiella. On 5/23, sputum culture also grew Pseudomonas. Antibiotic was switched to Zosyn.  Due to increased WBCs in CSF, possibility of ventriculitis was raised. CSF culture was negative. Antibiotic was switched to cefepime and vancomycin on 5/28/2021 for 2 weeks.  Vanco was discontinued on 6/9/2021. On 6/5/2021, sputum grew ESBL so cefepime was switched to meropenem. He was transferred to [facility] on 6/11/21.     Since admission to [facility], patient did not make significant progression for his neurology status. Trach weaning has been very slow due to his cognitive impairment.       9/10 :    Respiratory status stable  Neuro status stable  No other new issues noted today    Will continue to follow        Assessment & Plan      Acute on chronic hypoxemic respiratory failure: S/p treatment pseudomonas ESBL and Klebsiella pneumonia. Has excessive secretions. Weaning has been slow. Per pulmonary, his cognition prevents him from improving further. His pulmonary status could improve more if his cognition improves.  "  - Has been capped on room air since 8/31. Per pulmonary, no plan for decannulation due to excessive secretions.   - Continue to wean per RT and pulmonary.   - Airway secretion: Glycopyrrolate was tried and stopped. 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 Last CT head done on 7/1/21, which shows resolution of previous multicompartment hemorrhage in the right frontal lobe and the ventricles.   - Need follow up with Dr. Martinez with Neurosurgery in 3 months with repeat CTA of head and neck for post-op f/u. Next CTA of head and neck mid September.   -Given patient is somnolent/lethargic today, will order CT head.    Acute metabolic encephalopathy/cognitive impairment: His current neurology status is likely his new baseline due to frontal lobe injury.  Wife is still hopeful he will improve further but it is unclear if he has the capacity to focus and learn what they are attempting to teach him during therapy.  - Nuvigil started on 8/27 per neurology recommendation  - Patient continues to pull his IV lines and trach, which requires bilateral mitten restraint. Per nursing staff, patient has hallucination. Psy following.    - Continue Mirtazapine,Venlafaxine. ,     Dysphagia: On tube feeds and NPO.   - Speech therapy following. Per note from 8/17/21, despite several weeks of working with patient there has been \"no functional improvement in patient's ability to manage his oropharyngeal secretions.\" Speech discussed this with wife. Intensive dysphagia treatment has been discontinued.     Severe malnutrition: has moderate/severe subcutaneous fat loss, moderate/severe muscle loss  - continuous tube feeding per dietician     Chronic kidney disease (CKD) stage 3a: " Creatinine has been stable.    Anemia: hemoglobin stable. Continue to monitor.    Essential hypertension: BP controlled currently. Some high readings noted yest. . On amlodipine.    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 Sputum culture 6/19 showed MDR pseudomonas. On Tobramycin nebs 6/24/2021 - 7/9/2021.   - Continue pulmonary toilet, scheduled duonebs    Ventilator associated pneumonia: Completed antibiotics on July 18.    Severe debility, weakness, critical illness, deconditioning: Continue PT/OT    Hypercalcemia: unclear etiology, mild at this point. Parathyroid hormone normal. May be related to relative immobilization. Continue to monitor. Appreciate renal input.    Insomnia: Continue nighttime mirtazapine 7.5 mg scheduled.    Left inguinal hernia: US on 6/22/21 shows large left inguinal hernia containing mesenteric fat and bowel, extending to the superior scrotum, no evidence of intratesticular mass. Currently has no acute issue and no intervention indicated.       Diet: Adult Formula Drip Feeding: Continuous Novasource Renal; Gastrostomy; Goal Rate: 65; mL/hr; Medication - Feeding Tube Flush Frequency: At least 15-30 mL water before and after medication administration and with tube clogging; Amount to Send (Nutri...    DVT Prophylaxis: Heparin SQ  Gutierrez Catheter: Not present  Central Lines: None  Code Status: Full Code      Disposition Plan   Expected discharge: to be determined    The patient's care was discussed with RN, MARY KATE/KT. Updated patients wife over the phone.     Isreal Granda MD  Hospitalist Service  [facility]      ______________________________________________________________________    Physical Exam   Vital Signs: Temp: 97.5  F (36.4  C) Temp src: Axillary BP: 113/64 Pulse: 88   Resp: 24 SpO2: 96 % O2 Device: None (Room air)    Weight: 156 lbs 8 oz    General appearance: Not in acute distress  HEENT:  Trach in place.  Lungs: Clear  breath sounds in bilateral lung fields  Cardiovascular: Regular rate and rhythm, normal S1-S2  Abdomen: Soft, non tender, no distension  Neurology: Somnolent, hard to arouse.       Data   Recent Labs   Lab 09/10/21  0634 09/09/21  0658 09/08/21  0629 09/06/21  1649 09/06/21  0700   WBC  --   --   --   --  9.6   HGB  --   --   --   --  11.5*   MCV  --   --   --   --  99   PLT  --   --   --   --  314   * 145 144  --   --    POTASSIUM 4.0 4.2 4.0  --   --    CHLORIDE 104 104 104  --   --    CO2 31 28 28  --   --    BUN 49* 46* 43*  --   --    CR 0.86 0.86 0.83  --   --    ANIONGAP 11 13 12  --   --    RAGINI 11.1* 11.0* 11.1*  --   --     117 121  --   --    ALBUMIN 3.3* 3.2* 3.2*   < >  --     < > = values in this interval not displayed.       Restraint:    Patient pulls his IV line and trach.     Within an hour after restraint an in person face to face assessment was completed, including an evaluation of the patient's immediate reaction to the intervention, behavioral assessment and review/assessment of history, drugs and medications, recent labs, etc., and behavioral condition.  The patient experienced: No adverse physical outcome from seclusion/restraint initiation.  The intervention of restraint or seclusion needs to continue.

## 2021-09-10 NOTE — PLAN OF CARE
Problem: Device-Related Complication Risk (Artificial Airway)  Goal: Optimal Device Function  Outcome: Improving     Problem: Skin and Tissue Injury (Artificial Airway)  Goal: Absence of Device-Related Skin or Tissue Injury  Outcome: Improving     Problem: Communication Impairment (Artificial Airway)  Goal: Effective Communication  Outcome: Improving       RT PROGRESS NOTE     DATA:     CURRENT SETTINGS:             TRACH TYPE / SIZE: #6 Bivona 8/29/21             MODE:   Trach capped             FIO2:   RA     ACTION:             THERAPIES:   DUO NEB BID/ MUCOMYST BID/ VEST BID             SUCTION:                           FREQUENCY:  X2                        AMOUNT:  Small to large with lavage x1                        CONSISTENCY: Thick                        COLOR:   White/yellow             SPONTANEOUS COUGH EFFORT/STRENGTH OF EFFORT (not elicited by suctioning): Yes:Strong                              WEANING PHASE: #3                        WEAN MODE:  Trach capped/ RA                        WEAN TIME:                          END WEAN REASON: Cont     RESPONSE:             BS:  Coarse clears after suction             VITAL SIGNS:   SAT 91-96%, HR 85-91, RR 20-22             EMOTIONAL NEEDS / CONCERNS: N/A              RISK FOR SELF DECANNULATION: N/A                        RISK DUE TO:                          INTERVENTION/S IN PLACE IS/ARE: N/A     NOTE / PLAN:   Pt is on trach capped/RA ,reji well. Cont capping as reji and keep sat >/=90%

## 2021-09-10 NOTE — PLAN OF CARE
Problem: Anxiety  Goal: Anxiety Reduction or Resolution  Outcome: Improving     Problem: Skin and Tissue Injury (Artificial Airway)  Goal: Absence of Device-Related Skin or Tissue Injury  Outcome: Improving     Problem: Communication Impairment (Artificial Airway)  Goal: Effective Communication  Outcome: Improving   Pt alert and able to  communicate. Denied pain when asked. Raised his hands  in the air at times but no attempts to pull tubes.  Will continue to monitor.

## 2021-09-10 NOTE — PLAN OF CARE
RT PROGRESS NOTE     DATA:     CURRENT SETTINGS:             TRACH TYPE / SIZE:  #6 Bivona (placed 8/29)             MODE:   Capped             FIO2:   21%     ACTION:             THERAPIES:   Duoneb BID, Mucomyst BID, Vest BID             SUCTION:                           FREQUENCY:   x4                        AMOUNT:   Small to Large                        CONSISTENCY:   Thick                        COLOR:   Pale Yellow             SPONTANEOUS COUGH EFFORT/STRENGTH OF EFFORT (not elicited by suctioning): Moderate Spontaneous Cough                           WEANING PHASE:   Phase 3                        WEAN MODE:    Capped RA                        WEAN TIME:   Continuous                        END WEAN REASON:   NA     RESPONSE:             BS:   Coarse/Diminished             VITAL SIGNS:   Sating 94-98%, HR 82-93, RR 24             EMOTIONAL NEEDS / CONCERNS:  NA                RISK FOR SELF DECANNULATION:  No      NOTE / PLAN:   RT will continue to monitor.

## 2021-09-10 NOTE — PROGRESS NOTES
"Remote extensive chart review to include notes, labs, medications and care coordination with providers. Patient was not seen. Time spent 50  minutes.   Diagnoses/Assessment:   Mild intermittent metabolic encephalopathy fluctuating in the context of prolonged hospitalization, subarachnoid hemorrhage, respiratory failure with hypoxia complex medical condition.  Depression and anxiety.  Cognitive and behavioral changes due to above.  Impulsivity, restlessness and agitation.  Sleep difficulties.     I have attached part of the message for reference:  Negin Merchant, PT  \"Dayron has been more lethargic since being placed on Nuvigil.  Prior to placement on Nuvigil, Dayron was very alert but had a VERY difficult time attending to any task greater than 3-5 seconds making all therapy challenging.  When therapy (PT, OT, ST) spoke with Dr Cormier about changing medication, we were hoping to onboard some medication that would improve his initiation, processing and attention to task in order to successfully participate in therapy.  His cognition is significantly limiting his progress. \"      Reviewed all medications. Reviewed labs and notes. Received a message from PT that patient had not been able to participate in therapy or tasks due to his inattention, lacking focus to follow directions and lethargic also. He was started on Nuvigil 150 mg on 8/27 per neurology recommendations. It does not appear to be helping in progression of his care such as therapy specially PT/OT. PT is concerned of this cognitive changes on top of what he already was experiencing due to complex medical issues and Giuseppe injury.   It is pertinent that the therapy continues at a good pace for him to be able to get out of LTAC.  He is also on Venlafaxine, that at times can help with increasing focus and attention, and he is tolerating it. Depression due to TBI can be contributing to lacking focus.     Since Nuvigil is not helping, I would recommend discontinuing " it and increasing AM dose of Venlafaxine, a gentle increase. Also efforts on sleep regulation. See how he does over the weekend.   Adding Ritalin 5 mg QAM and 2.5 mg noon would be next option.     I have discussed this with Dr Cormier since PT/OT mentioned of talking to him.       Total Visit Time: 50 minutes No contact

## 2021-09-10 NOTE — PLAN OF CARE
Problem: Device-Related Complication Risk (Artificial Airway)  Goal: Optimal Device Function  Outcome: No Change  Intervention: Optimize Device Care and Function  Recent Flowsheet Documentation  Taken 9/10/2021 1133 by Jennifer Mon RT  Airway Safety Measures: all equipment/monitors on and audible  Taken 9/10/2021 0704 by Jennifer Mon RT  Airway Safety Measures: all equipment/monitors on and audible     Problem: Skin and Tissue Injury (Artificial Airway)  Goal: Absence of Device-Related Skin or Tissue Injury  Outcome: No Change     Problem: Communication Impairment (Artificial Airway)  Goal: Effective Communication  Outcome: No Change     Problem: Device-Related Complication Risk (Mechanical Ventilation, Invasive)  Goal: Optimal Device Function  Intervention: Optimize Device Care and Function  Recent Flowsheet Documentation  Taken 9/10/2021 1133 by Jennifer Mon RT  Airway Safety Measures: all equipment/monitors on and audible  Taken 9/10/2021 0704 by Jennifer Mon RT  Airway Safety Measures: all equipment/monitors on and audible

## 2021-09-11 ENCOUNTER — APPOINTMENT (OUTPATIENT)
Dept: PHYSICAL THERAPY | Facility: CLINIC | Age: 71
DRG: 207 | End: 2021-09-11
Attending: HOSPITALIST
Payer: COMMERCIAL

## 2021-09-11 PROCEDURE — 999N000123 HC STATISTIC OXYGEN O2DAILY TECH TIME

## 2021-09-11 PROCEDURE — 999N000009 HC STATISTIC AIRWAY CARE

## 2021-09-11 PROCEDURE — 94669 MECHANICAL CHEST WALL OSCILL: CPT

## 2021-09-11 PROCEDURE — 97112 NEUROMUSCULAR REEDUCATION: CPT | Mod: GP | Performed by: PHYSICAL THERAPIST

## 2021-09-11 PROCEDURE — 250N000013 HC RX MED GY IP 250 OP 250 PS 637: Performed by: NURSE PRACTITIONER

## 2021-09-11 PROCEDURE — 250N000013 HC RX MED GY IP 250 OP 250 PS 637: Performed by: HOSPITALIST

## 2021-09-11 PROCEDURE — 250N000013 HC RX MED GY IP 250 OP 250 PS 637: Performed by: INTERNAL MEDICINE

## 2021-09-11 PROCEDURE — 99233 SBSQ HOSP IP/OBS HIGH 50: CPT | Performed by: INTERNAL MEDICINE

## 2021-09-11 PROCEDURE — 250N000011 HC RX IP 250 OP 636

## 2021-09-11 PROCEDURE — 94640 AIRWAY INHALATION TREATMENT: CPT | Mod: 76

## 2021-09-11 PROCEDURE — 120N000001 HC R&B MED SURG/OB

## 2021-09-11 PROCEDURE — 97530 THERAPEUTIC ACTIVITIES: CPT | Mod: GP | Performed by: PHYSICAL THERAPIST

## 2021-09-11 PROCEDURE — 999N000157 HC STATISTIC RCP TIME EA 10 MIN

## 2021-09-11 PROCEDURE — 250N000009 HC RX 250: Performed by: INTERNAL MEDICINE

## 2021-09-11 PROCEDURE — 250N000013 HC RX MED GY IP 250 OP 250 PS 637

## 2021-09-11 PROCEDURE — 94640 AIRWAY INHALATION TREATMENT: CPT

## 2021-09-11 RX ORDER — METHYLPHENIDATE HYDROCHLORIDE 10 MG/1
10 TABLET ORAL DAILY
Status: DISCONTINUED | OUTPATIENT
Start: 2021-09-12 | End: 2021-09-13

## 2021-09-11 RX ADMIN — FAMOTIDINE 20 MG: 20 TABLET, FILM COATED ORAL at 21:47

## 2021-09-11 RX ADMIN — VENLAFAXINE 50 MG: 25 TABLET ORAL at 12:13

## 2021-09-11 RX ADMIN — HEPARIN SODIUM 5000 UNITS: 5000 INJECTION, SOLUTION INTRAVENOUS; SUBCUTANEOUS at 05:27

## 2021-09-11 RX ADMIN — ACETYLCYSTEINE 2 ML: 200 SOLUTION ORAL; RESPIRATORY (INHALATION) at 21:14

## 2021-09-11 RX ADMIN — ANORECTAL OINTMENT: 15.7; .44; 24; 20.6 OINTMENT TOPICAL at 21:48

## 2021-09-11 RX ADMIN — AMLODIPINE BESYLATE 10 MG: 10 TABLET ORAL at 08:03

## 2021-09-11 RX ADMIN — HEPARIN SODIUM 5000 UNITS: 5000 INJECTION, SOLUTION INTRAVENOUS; SUBCUTANEOUS at 14:12

## 2021-09-11 RX ADMIN — Medication 2 PACKET: at 14:12

## 2021-09-11 RX ADMIN — ACETYLCYSTEINE 2 ML: 200 SOLUTION ORAL; RESPIRATORY (INHALATION) at 07:39

## 2021-09-11 RX ADMIN — IPRATROPIUM BROMIDE AND ALBUTEROL SULFATE 3 ML: 2.5; .5 SOLUTION RESPIRATORY (INHALATION) at 07:39

## 2021-09-11 RX ADMIN — VENLAFAXINE 25 MG: 25 TABLET ORAL at 08:03

## 2021-09-11 RX ADMIN — HEPARIN SODIUM 5000 UNITS: 5000 INJECTION, SOLUTION INTRAVENOUS; SUBCUTANEOUS at 21:53

## 2021-09-11 RX ADMIN — ACETAMINOPHEN ORAL SOLUTION 650 MG: 650 SOLUTION ORAL at 21:56

## 2021-09-11 RX ADMIN — VENLAFAXINE 50 MG: 25 TABLET ORAL at 08:04

## 2021-09-11 RX ADMIN — FAMOTIDINE 20 MG: 20 TABLET, FILM COATED ORAL at 08:03

## 2021-09-11 RX ADMIN — IPRATROPIUM BROMIDE AND ALBUTEROL SULFATE 3 ML: 2.5; .5 SOLUTION RESPIRATORY (INHALATION) at 21:14

## 2021-09-11 RX ADMIN — Medication 2 PACKET: at 08:04

## 2021-09-11 RX ADMIN — ANORECTAL OINTMENT: 15.7; .44; 24; 20.6 OINTMENT TOPICAL at 14:12

## 2021-09-11 RX ADMIN — ANORECTAL OINTMENT: 15.7; .44; 24; 20.6 OINTMENT TOPICAL at 08:05

## 2021-09-11 RX ADMIN — Medication 2 PACKET: at 21:47

## 2021-09-11 RX ADMIN — VENLAFAXINE 50 MG: 25 TABLET ORAL at 16:22

## 2021-09-11 RX ADMIN — MIRTAZAPINE 7.5 MG: 7.5 TABLET ORAL at 21:47

## 2021-09-11 ASSESSMENT — MIFFLIN-ST. JEOR: SCORE: 1499.48

## 2021-09-11 NOTE — PLAN OF CARE
Problem: Adult Inpatient Plan of Care  Goal: Patient-Specific Goal (Individualized)  Outcome: Improving     Problem: Adult Inpatient Plan of Care  Goal: Absence of Hospital-Acquired Illness or Injury  Outcome: Improving  Intervention: Identify and Manage Fall Risk  Recent Flowsheet Documentation  Taken 9/10/2021 1847 by Amanda Jean RN  Safety Promotion/Fall Prevention: bed alarm on  Intervention: Prevent Infection  Recent Flowsheet Documentation  Taken 9/10/2021 1847 by Amanda Jean RN  Infection Prevention: hand hygiene promoted     Problem: Adult Inpatient Plan of Care  Goal: Absence of Hospital-Acquired Illness or Injury  Intervention: Prevent Infection  Recent Flowsheet Documentation  Taken 9/10/2021 1847 by Amanda Jean RN  Infection Prevention: hand hygiene promoted   Patient was calm, alert, cooperative with cares and tolerated transfers well. Patient tolerated continuous tube feed well. No complain of pain.

## 2021-09-11 NOTE — PLAN OF CARE
Problem: Anxiety  Goal: Anxiety Reduction or Resolution  Outcome: Improving  Intervention: Promote Anxiety Reduction  Recent Flowsheet Documentation  Taken 9/11/2021 1000 by Liya Phipps RN  Family/Support System Care: presence promoted     Problem: Adult Inpatient Plan of Care  Goal: Absence of Hospital-Acquired Illness or Injury  Intervention: Identify and Manage Fall Risk  Recent Flowsheet Documentation  Taken 9/11/2021 0845 by Liya Phipps RN  Safety Promotion/Fall Prevention:   activity supervised   assistive device/personal items within reach   safety round/check completed  Intervention: Prevent Infection  Recent Flowsheet Documentation  Taken 9/11/2021 0845 by Liya Phipps RN  Infection Prevention: hand hygiene promoted   Patient was calm and cooperative with cares, made no attempt to pull at treatment tubes, assisted with mechanical transfer to chair for physical therapy, was tired following PT session, fell asleep by the time family came in to visit, wife asking if ritalin could be resumed to try and keep patient awake during the day. MD informed.

## 2021-09-11 NOTE — PLAN OF CARE
Problem: Anxiety  Goal: Anxiety Reduction or Resolution  Outcome: Improving     Problem: Skin and Tissue Injury (Artificial Airway)  Goal: Absence of Device-Related Skin or Tissue Injury  Outcome: Improving     Problem: Communication Impairment (Artificial Airway)  Goal: Effective Communication  Outcome: Improving   Pt vitals stable. Denied pain when asked. No signs of anxiety and discomfort noted.  No new skin issue noted. Pt slept  majority of the night.    Problem: Restraint for Non-Violent/Non-Self-Destructive Behavior  Goal: Prevent/Manage Potential Problems  Description: Maintain safety of patient and others during period of restraint.  Promote psychological and physical wellbeing.  Prevent injury to skin and involved body parts.  Promote nutrition, hydration, and elimination.  Outcome: Completed   Pt did not have any attempts of pulling tubes this shift. Will continue to monitor.

## 2021-09-11 NOTE — PROGRESS NOTES
[facility]    Medicine Progress Note - Hospitalist Service       Date of Admission:  6/11/2021    Summary:  Dayorn Neri is a 70 year male with h/o HTN who presented to ED on 5/15/2021 with acute SAH after a fall with unresponsiveness.  He underwent emergent left external ventricular drain placement.  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  On 5/28/2021, patient had persistent cerebral vasospasm, CT head showed bilateral BAYLEE infarct and was treated with milrinone and verapamil.  On 6/1/2021, angiogram showed no evidence of vasospasm.  Extubated on 6/3/2021.  6/6/2021 he was reintubated due to hypoxia.  On 6/7/2021, patient underwent tracheostomy and PEG tube placement.  EVD removed 6/9/2021.   Patient was treated for possible pneumonia with Unasyn on 5/20/2021, It was switched to ceftriaxone the next day for sputum culture growing Klebsiella. On 5/23, sputum culture also grew Pseudomonas. Antibiotic was switched to Zosyn.  Due to increased WBCs in CSF, possibility of ventriculitis was raised. CSF culture was negative. Antibiotic was switched to cefepime and vancomycin on 5/28/2021 for 2 weeks.  Vanco was discontinued on 6/9/2021. On 6/5/2021, sputum grew ESBL so cefepime was switched to meropenem. He was transferred to [facility] on 6/11/21.     Since admission to [facility], patient did not make significant progression for his neurology status. Trach weaning has been very slow due to his cognitive impairment.       9/11 :    Respiratory status stable  Neuro status stable  No other new issues noted today  Stopped neuvigil and started ritalin    Will continue to follow        Assessment & Plan      Acute on chronic hypoxemic respiratory failure: S/p treatment pseudomonas ESBL and Klebsiella pneumonia. Has excessive secretions. Weaning has been slow. Per pulmonary, his cognition prevents him from improving further. His pulmonary status could  "improve more if his cognition improves.   - Has been capped on room air since 8/31. Per pulmonary, no plan for decannulation due to excessive secretions.   - Continue to wean per RT and pulmonary.   - Airway secretion: Glycopyrrolate was tried and stopped. 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 Last CT head done on 7/1/21, which shows resolution of previous multicompartment hemorrhage in the right frontal lobe and the ventricles.   - Need follow up with Dr. Martinez with Neurosurgery in 3 months with repeat CTA of head and neck for post-op f/u. Next CTA of head and neck mid September.   -Given patient is somnolent/lethargic today, will order CT head.    Acute metabolic encephalopathy/cognitive impairment: His current neurology status is likely his new baseline due to frontal lobe injury.  Wife is still hopeful he will improve further but it is unclear if he has the capacity to focus and learn what they are attempting to teach him during therapy.  - Nuvigil started on 8/27 per neurology recommendation  - Patient continues to pull his IV lines and trach, which requires bilateral mitten restraint. Per nursing staff, patient has hallucination. Psy following.    - Continue Mirtazapine,Venlafaxine. ,     Dysphagia: On tube feeds and NPO.   - Speech therapy following. Per note from 8/17/21, despite several weeks of working with patient there has been \"no functional improvement in patient's ability to manage his oropharyngeal secretions.\" Speech discussed this with wife. Intensive dysphagia treatment has been discontinued.     Severe malnutrition: has moderate/severe subcutaneous fat loss, moderate/severe muscle loss  - continuous tube feeding per dietician "     Chronic kidney disease (CKD) stage 3a: Creatinine has been stable.    Anemia: hemoglobin stable. Continue to monitor.    Essential hypertension: BP controlled currently. Some high readings noted yest. . On amlodipine.    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 Sputum culture 6/19 showed MDR pseudomonas. On Tobramycin nebs 6/24/2021 - 7/9/2021.   - Continue pulmonary toilet, scheduled duonebs    Ventilator associated pneumonia: Completed antibiotics on July 18.    Severe debility, weakness, critical illness, deconditioning: Continue PT/OT    Hypercalcemia: unclear etiology, mild at this point. Parathyroid hormone normal. May be related to relative immobilization. Continue to monitor. Appreciate renal input.    Insomnia: Continue nighttime mirtazapine 7.5 mg scheduled.    Left inguinal hernia: US on 6/22/21 shows large left inguinal hernia containing mesenteric fat and bowel, extending to the superior scrotum, no evidence of intratesticular mass. Currently has no acute issue and no intervention indicated.       Diet: Adult Formula Drip Feeding: Continuous Novasource Renal; Gastrostomy; Goal Rate: 65; mL/hr; Medication - Feeding Tube Flush Frequency: At least 15-30 mL water before and after medication administration and with tube clogging; Amount to Send (Nutri...    DVT Prophylaxis: Heparin SQ  Gutierrez Catheter: Not present  Central Lines: None  Code Status: Full Code      Disposition Plan   Expected discharge: to be determined    The patient's care was discussed with RN, MARY KATE/KT. Updated patients wife over the phone.     Isreal Granda MD  Hospitalist Service  [facility]      ______________________________________________________________________    Physical Exam   Vital Signs: Temp: 98.1  F (36.7  C) Temp src: Axillary BP: 111/73 Pulse: 97   Resp: 20 SpO2: 95 % O2 Device: None (Room air)    Weight: 156 lbs 0 oz    General appearance: Not in acute  distress  HEENT:  Trach in place.  Lungs: Clear breath sounds in bilateral lung fields  Cardiovascular: Regular rate and rhythm, normal S1-S2  Abdomen: Soft, non tender, no distension  Neurology: Somnolent, hard to arouse.       Data   Recent Labs   Lab 09/10/21  0634 09/09/21  0658 09/08/21  0629 09/06/21  1649 09/06/21  0700   WBC  --   --   --   --  9.6   HGB  --   --   --   --  11.5*   MCV  --   --   --   --  99   PLT  --   --   --   --  314   * 145 144  --   --    POTASSIUM 4.0 4.2 4.0  --   --    CHLORIDE 104 104 104  --   --    CO2 31 28 28  --   --    BUN 49* 46* 43*  --   --    CR 0.86 0.86 0.83  --   --    ANIONGAP 11 13 12  --   --    RAGINI 11.1* 11.0* 11.1*  --   --     117 121  --   --    ALBUMIN 3.3* 3.2* 3.2*   < >  --     < > = values in this interval not displayed.       Restraint:    Patient pulls his IV line and trach.     Within an hour after restraint an in person face to face assessment was completed, including an evaluation of the patient's immediate reaction to the intervention, behavioral assessment and review/assessment of history, drugs and medications, recent labs, etc., and behavioral condition.  The patient experienced: No adverse physical outcome from seclusion/restraint initiation.  The intervention of restraint or seclusion needs to continue.

## 2021-09-11 NOTE — PLAN OF CARE
RT PROGRESS NOTE     DATA:     CURRENT SETTINGS: trach capped              TRACH TYPE / SIZE:  #6 Bivona              MODE:   Capped             FIO2:   RA     ACTION:             THERAPIES:Duo/bid, Muco/bid, Vest BID             SUCTION: yes                          FREQUENCY:   x2                        AMOUNT:  Large                        CONSISTENCY:   Thick                        COLOR:   Pale Yellow             SPONTANEOUS COUGH EFFORT/STRENGTH OF EFFORT (not elicited by suctioning): Moderate Spontaneous Cough                           WEANING PHASE:   Phase 3                        WEAN MODE:    Capped RA                        WEAN TIME:   Continuous                        END WEAN REASON:   NA     RESPONSE:             BS:   Coarse/Diminished             VITAL SIGNS:   Sating 92-97%, HR 82-99, RR 15-22             EMOTIONAL NEEDS / CONCERNS:  NA                RISK FOR SELF DECANNULATION:  No      NOTE / PLAN:     RT will continue to monitor. Patient continues continues to remain weak

## 2021-09-11 NOTE — PLAN OF CARE
Problem: Device-Related Complication Risk (Artificial Airway)  Goal: Optimal Device Function  Outcome: Improving     Problem: Skin and Tissue Injury (Artificial Airway)  Goal: Absence of Device-Related Skin or Tissue Injury  Outcome: Improving     Problem: Communication Impairment (Artificial Airway)  Goal: Effective Communication  Outcome: Improving       RT PROGRESS NOTE     DATA:     CURRENT SETTINGS:             TRACH TYPE / SIZE: #6 Bivona 8/29/21             MODE:   Trach capped             FIO2:   RA     ACTION:             THERAPIES:   DUO NEB BID/ MUCOMYST BID/ VEST BID             SUCTION:                           FREQUENCY:  X3                        AMOUNT:   Large to small                        CONSISTENCY: Thick                        COLOR:   White/yellow             SPONTANEOUS COUGH EFFORT/STRENGTH OF EFFORT (not elicited by suctioning): Yes:Strong                              WEANING PHASE: #3                        WEAN MODE:  Trach capped/ RA                        WEAN TIME:                          END WEAN REASON: Cont     RESPONSE:             BS:  Coarse              VITAL SIGNS:   SAT 93-97%, HR 84-88, RR 20-22             EMOTIONAL NEEDS / CONCERNS: N/A              RISK FOR SELF DECANNULATION: N/A                        RISK DUE TO:                          INTERVENTION/S IN PLACE IS/ARE: N/A     NOTE / PLAN:   Pt is on trach capped with room air, reji well. Cont capping as  reji and keep sat >/=90%

## 2021-09-11 NOTE — PLAN OF CARE
Problem: Device-Related Complication Risk (Artificial Airway)  Goal: Optimal Device Function  Outcome: No Change     Problem: Skin and Tissue Injury (Artificial Airway)  Goal: Absence of Device-Related Skin or Tissue Injury  Outcome: No Change     Problem: Communication Impairment (Artificial Airway)  Goal: Effective Communication  Outcome: No Change   Respiratory Care Progress Note    Airway/oxygen:    Current settings: capped, room air  Trach type/size: 6 placed on 8/29/2021    Actions:    Medications/therapies: Duoneb BID, Mucomyst BID, chest vest BID  Overnight suctioning   Frequency: 3x   Amount: moderate-large   Consistency: thick   Color: white   Spontaneous effort: good, strong    Weaning phase: 3  Daytime plan: capped, room air  Nighttime plan: capped, room air    Response    Breath sounds: diminished/rhonchi  Vital signs: Temp: 97.9  F (36.6  C) Temp src: Axillary BP: (!) 140/80 Pulse: 84   Resp: 20 SpO2: 96 % O2 Device: None (Room air)      Risk for self decannulation: no    Note/Plan: continue plan of care

## 2021-09-12 PROCEDURE — 250N000013 HC RX MED GY IP 250 OP 250 PS 637: Performed by: INTERNAL MEDICINE

## 2021-09-12 PROCEDURE — 999N000157 HC STATISTIC RCP TIME EA 10 MIN

## 2021-09-12 PROCEDURE — 250N000009 HC RX 250: Performed by: INTERNAL MEDICINE

## 2021-09-12 PROCEDURE — 250N000013 HC RX MED GY IP 250 OP 250 PS 637

## 2021-09-12 PROCEDURE — 250N000013 HC RX MED GY IP 250 OP 250 PS 637: Performed by: NURSE PRACTITIONER

## 2021-09-12 PROCEDURE — 99233 SBSQ HOSP IP/OBS HIGH 50: CPT | Performed by: INTERNAL MEDICINE

## 2021-09-12 PROCEDURE — 999N000009 HC STATISTIC AIRWAY CARE

## 2021-09-12 PROCEDURE — 250N000011 HC RX IP 250 OP 636

## 2021-09-12 PROCEDURE — 94640 AIRWAY INHALATION TREATMENT: CPT | Mod: 76

## 2021-09-12 PROCEDURE — 120N000001 HC R&B MED SURG/OB

## 2021-09-12 PROCEDURE — 250N000013 HC RX MED GY IP 250 OP 250 PS 637: Performed by: HOSPITALIST

## 2021-09-12 PROCEDURE — 94640 AIRWAY INHALATION TREATMENT: CPT

## 2021-09-12 PROCEDURE — 94669 MECHANICAL CHEST WALL OSCILL: CPT

## 2021-09-12 RX ORDER — NYSTATIN 100000/ML
500000 SUSPENSION, ORAL (FINAL DOSE FORM) ORAL 4 TIMES DAILY
Status: COMPLETED | OUTPATIENT
Start: 2021-09-12 | End: 2021-09-19

## 2021-09-12 RX ORDER — BENZOCAINE/MENTHOL 6 MG-10 MG
LOZENGE MUCOUS MEMBRANE 2 TIMES DAILY
Status: DISCONTINUED | OUTPATIENT
Start: 2021-09-12 | End: 2021-09-27 | Stop reason: HOSPADM

## 2021-09-12 RX ORDER — GENTAMICIN SULFATE 3 MG/ML
1 SOLUTION/ DROPS OPHTHALMIC 3 TIMES DAILY
Status: COMPLETED | OUTPATIENT
Start: 2021-09-12 | End: 2021-09-17

## 2021-09-12 RX ADMIN — Medication 2 PACKET: at 14:17

## 2021-09-12 RX ADMIN — ANORECTAL OINTMENT: 15.7; .44; 24; 20.6 OINTMENT TOPICAL at 21:24

## 2021-09-12 RX ADMIN — FAMOTIDINE 20 MG: 20 TABLET, FILM COATED ORAL at 21:20

## 2021-09-12 RX ADMIN — VENLAFAXINE 50 MG: 25 TABLET ORAL at 12:08

## 2021-09-12 RX ADMIN — VENLAFAXINE 50 MG: 25 TABLET ORAL at 16:49

## 2021-09-12 RX ADMIN — AMLODIPINE BESYLATE 10 MG: 10 TABLET ORAL at 08:09

## 2021-09-12 RX ADMIN — HEPARIN SODIUM 5000 UNITS: 5000 INJECTION, SOLUTION INTRAVENOUS; SUBCUTANEOUS at 21:34

## 2021-09-12 RX ADMIN — NYSTATIN 500000 UNITS: 100000 SUSPENSION ORAL at 21:20

## 2021-09-12 RX ADMIN — Medication 2 PACKET: at 21:23

## 2021-09-12 RX ADMIN — SCOPALAMINE 1 PATCH: 1 PATCH, EXTENDED RELEASE TRANSDERMAL at 14:24

## 2021-09-12 RX ADMIN — MIRTAZAPINE 7.5 MG: 7.5 TABLET ORAL at 21:20

## 2021-09-12 RX ADMIN — HEPARIN SODIUM 5000 UNITS: 5000 INJECTION, SOLUTION INTRAVENOUS; SUBCUTANEOUS at 14:17

## 2021-09-12 RX ADMIN — ACETYLCYSTEINE 2 ML: 200 SOLUTION ORAL; RESPIRATORY (INHALATION) at 21:01

## 2021-09-12 RX ADMIN — ANORECTAL OINTMENT: 15.7; .44; 24; 20.6 OINTMENT TOPICAL at 14:18

## 2021-09-12 RX ADMIN — METHYLPHENIDATE HYDROCHLORIDE 10 MG: 10 TABLET ORAL at 07:07

## 2021-09-12 RX ADMIN — ANORECTAL OINTMENT: 15.7; .44; 24; 20.6 OINTMENT TOPICAL at 08:10

## 2021-09-12 RX ADMIN — VENLAFAXINE 50 MG: 25 TABLET ORAL at 08:08

## 2021-09-12 RX ADMIN — ACETYLCYSTEINE 2 ML: 200 SOLUTION ORAL; RESPIRATORY (INHALATION) at 08:22

## 2021-09-12 RX ADMIN — Medication 2 PACKET: at 08:10

## 2021-09-12 RX ADMIN — VENLAFAXINE 25 MG: 25 TABLET ORAL at 07:07

## 2021-09-12 RX ADMIN — IPRATROPIUM BROMIDE AND ALBUTEROL SULFATE 3 ML: 2.5; .5 SOLUTION RESPIRATORY (INHALATION) at 08:22

## 2021-09-12 RX ADMIN — FAMOTIDINE 20 MG: 20 TABLET, FILM COATED ORAL at 08:09

## 2021-09-12 RX ADMIN — IPRATROPIUM BROMIDE AND ALBUTEROL SULFATE 3 ML: 2.5; .5 SOLUTION RESPIRATORY (INHALATION) at 21:01

## 2021-09-12 RX ADMIN — HYDROCORTISONE: 1 CREAM TOPICAL at 21:26

## 2021-09-12 RX ADMIN — GENTAMICIN SULFATE 1 DROP: 3 SOLUTION/ DROPS OPHTHALMIC at 16:50

## 2021-09-12 RX ADMIN — POLYETHYLENE GLYCOL 3350 17 G: 17 POWDER, FOR SOLUTION ORAL at 08:10

## 2021-09-12 RX ADMIN — NYSTATIN 500000 UNITS: 100000 SUSPENSION ORAL at 16:49

## 2021-09-12 RX ADMIN — HEPARIN SODIUM 5000 UNITS: 5000 INJECTION, SOLUTION INTRAVENOUS; SUBCUTANEOUS at 07:07

## 2021-09-12 RX ADMIN — GENTAMICIN SULFATE 1 DROP: 3 SOLUTION/ DROPS OPHTHALMIC at 21:25

## 2021-09-12 ASSESSMENT — MIFFLIN-ST. JEOR: SCORE: 1513.09

## 2021-09-12 NOTE — PLAN OF CARE
Problem: Adult Inpatient Plan of Care  Goal: Plan of Care Review  Outcome: No Change       Pt's trach remains capped, O2 sat 93%, LS coarse. Pt's BP slightly elevated @ 140/80 on nights. Is able to give some appropriate responses. Pt admits to having (R) shoulder pain, yet unable to rate pain. Given PRN Tylenol 650 mg with good effect. Uses condom catheter to contain urine, changed on nights d/t coming loose. Pt slept well t/o the night.

## 2021-09-12 NOTE — PLAN OF CARE
RT PROGRESS NOTE     DATA:     CURRENT SETTINGS: trach capped              TRACH TYPE / SIZE:  #6 Bivona              MODE:   Capped             FIO2:  1l nc     ACTION:             THERAPIES:Duo/bid, Muco/bid, Vest BID             SUCTION: yes                          FREQUENCY:   x 3                        AMOUNT:  Large                        CONSISTENCY:   Thick                        COLOR:   Pale Yellow             SPONTANEOUS COUGH EFFORT/STRENGTH OF EFFORT (not elicited by suctioning): Moderate Spontaneous Cough                           WEANING PHASE:   Phase 3                        WEAN MODE:    Capped RA                        WEAN TIME:   Continuous                        END WEAN REASON:   NA     RESPONSE:             BS:   Coarse/Diminished             VITAL SIGNS:   Sating 92-97%, HR 89-99, RR 20             EMOTIONAL NEEDS / CONCERNS:  NA                RISK FOR SELF DECANNULATION:  No      NOTE / PLAN:     RT will continue to monitor. Vest was not given x1 in am due to high BP(199/90)in am.

## 2021-09-12 NOTE — PROGRESS NOTES
[facility]    Medicine Progress Note - Hospitalist Service       Date of Admission:  6/11/2021    Summary:  Dayron Neri is a 70 year male with h/o HTN who presented to ED on 5/15/2021 with acute SAH after a fall with unresponsiveness.  He underwent emergent left external ventricular drain placement.  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  On 5/28/2021, patient had persistent cerebral vasospasm, CT head showed bilateral BAYLEE infarct and was treated with milrinone and verapamil.  On 6/1/2021, angiogram showed no evidence of vasospasm.  Extubated on 6/3/2021.  6/6/2021 he was reintubated due to hypoxia.  On 6/7/2021, patient underwent tracheostomy and PEG tube placement.  EVD removed 6/9/2021.   Patient was treated for possible pneumonia with Unasyn on 5/20/2021, It was switched to ceftriaxone the next day for sputum culture growing Klebsiella. On 5/23, sputum culture also grew Pseudomonas. Antibiotic was switched to Zosyn.  Due to increased WBCs in CSF, possibility of ventriculitis was raised. CSF culture was negative. Antibiotic was switched to cefepime and vancomycin on 5/28/2021 for 2 weeks.  Vanco was discontinued on 6/9/2021. On 6/5/2021, sputum grew ESBL so cefepime was switched to meropenem. He was transferred to [facility] on 6/11/21.     Since admission to [facility], patient did not make significant progression for his neurology status. Trach weaning has been very slow due to his cognitive impairment.       9/12:    Respiratory status stable  Neuro status stable  No other new issues noted today  Stopped neuvigil and started ritalin  Some redness in eyes - started gentamycin eye drops  Rash around neck - started hydrocortisone cream  Nystatin powder oral for oral thrush    Will continue to follow        Assessment & Plan      Acute on chronic hypoxemic respiratory failure: S/p treatment pseudomonas ESBL and Klebsiella pneumonia. Has  "excessive secretions. Weaning has been slow. Per pulmonary, his cognition prevents him from improving further. His pulmonary status could improve more if his cognition improves.   - Has been capped on room air since 8/31. Per pulmonary, no plan for decannulation due to excessive secretions.   - Continue to wean per RT and pulmonary.   - Airway secretion: Glycopyrrolate was tried and stopped. 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 Last CT head done on 7/1/21, which shows resolution of previous multicompartment hemorrhage in the right frontal lobe and the ventricles.   - Need follow up with Dr. Martinez with Neurosurgery in 3 months with repeat CTA of head and neck for post-op f/u. Next CTA of head and neck mid September.   -Given patient is somnolent/lethargic today, will order CT head.    Acute metabolic encephalopathy/cognitive impairment: His current neurology status is likely his new baseline due to frontal lobe injury.  Wife is still hopeful he will improve further but it is unclear if he has the capacity to focus and learn what they are attempting to teach him during therapy.  - Nuvigil started on 8/27 per neurology recommendation  - Patient continues to pull his IV lines and trach, which requires bilateral mitten restraint. Per nursing staff, patient has hallucination. Psy following.    - Continue Mirtazapine,Venlafaxine. ,     Dysphagia: On tube feeds and NPO.   - Speech therapy following. Per note from 8/17/21, despite several weeks of working with patient there has been \"no functional improvement in patient's ability to manage his oropharyngeal secretions.\" Speech discussed this with wife. Intensive dysphagia treatment has been discontinued. "     Severe malnutrition: has moderate/severe subcutaneous fat loss, moderate/severe muscle loss  - continuous tube feeding per dietician     Chronic kidney disease (CKD) stage 3a: Creatinine has been stable.    Anemia: hemoglobin stable. Continue to monitor.    Essential hypertension: BP controlled currently. Some high readings noted yest. . On amlodipine.    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 Sputum culture 6/19 showed MDR pseudomonas. On Tobramycin nebs 6/24/2021 - 7/9/2021.   - Continue pulmonary toilet, scheduled duonebs    Ventilator associated pneumonia: Completed antibiotics on July 18.    Severe debility, weakness, critical illness, deconditioning: Continue PT/OT    Hypercalcemia: unclear etiology, mild at this point. Parathyroid hormone normal. May be related to relative immobilization. Continue to monitor. Appreciate renal input.    Insomnia: Continue nighttime mirtazapine 7.5 mg scheduled.    Left inguinal hernia: US on 6/22/21 shows large left inguinal hernia containing mesenteric fat and bowel, extending to the superior scrotum, no evidence of intratesticular mass. Currently has no acute issue and no intervention indicated.       Diet: Adult Formula Drip Feeding: Continuous Novasource Renal; Gastrostomy; Goal Rate: 65; mL/hr; Medication - Feeding Tube Flush Frequency: At least 15-30 mL water before and after medication administration and with tube clogging; Amount to Send (Nutri...    DVT Prophylaxis: Heparin SQ  Gutierrez Catheter: Not present  Central Lines: None  Code Status: Full Code      Disposition Plan   Expected discharge: to be determined    The patient's care was discussed with RN, MARY KATE/KT. Updated patients wife over the phone.     Isreal Granda MD  Hospitalist Service  [facility]      ______________________________________________________________________    Physical Exam   Vital Signs: Temp: 97.5  F (36.4  C) Temp src: Oral BP:  (!) 169/77 Pulse: 92   Resp: 20 SpO2: 96 % O2 Device: None (Room air)    Weight: 159 lbs 0 oz    General appearance: Not in acute distress  HEENT:  Trach in place.  Lungs: Clear breath sounds in bilateral lung fields  Cardiovascular: Regular rate and rhythm, normal S1-S2  Abdomen: Soft, non tender, no distension  Neurology: Somnolent, hard to arouse.       Data   Recent Labs   Lab 09/10/21  0634 09/09/21  0658 09/08/21  0629 09/06/21  1649 09/06/21  0700   WBC  --   --   --   --  9.6   HGB  --   --   --   --  11.5*   MCV  --   --   --   --  99   PLT  --   --   --   --  314   * 145 144  --   --    POTASSIUM 4.0 4.2 4.0  --   --    CHLORIDE 104 104 104  --   --    CO2 31 28 28  --   --    BUN 49* 46* 43*  --   --    CR 0.86 0.86 0.83  --   --    ANIONGAP 11 13 12  --   --    RAGINI 11.1* 11.0* 11.1*  --   --     117 121  --   --    ALBUMIN 3.3* 3.2* 3.2*   < >  --     < > = values in this interval not displayed.       Restraint:    Patient pulls his IV line and trach.     Within an hour after restraint an in person face to face assessment was completed, including an evaluation of the patient's immediate reaction to the intervention, behavioral assessment and review/assessment of history, drugs and medications, recent labs, etc., and behavioral condition.  The patient experienced: No adverse physical outcome from seclusion/restraint initiation.  The intervention of restraint or seclusion needs to continue.

## 2021-09-13 ENCOUNTER — APPOINTMENT (OUTPATIENT)
Dept: PHYSICAL THERAPY | Facility: CLINIC | Age: 71
DRG: 207 | End: 2021-09-13
Attending: HOSPITALIST
Payer: COMMERCIAL

## 2021-09-13 LAB
ANION GAP SERPL CALCULATED.3IONS-SCNC: 11 MMOL/L (ref 5–18)
BASOPHILS # BLD AUTO: 0 10E3/UL (ref 0–0.2)
BASOPHILS NFR BLD AUTO: 0 %
BUN SERPL-MCNC: 37 MG/DL (ref 8–22)
CALCIUM SERPL-MCNC: 10.9 MG/DL (ref 8.5–10.5)
CHLORIDE BLD-SCNC: 105 MMOL/L (ref 98–107)
CO2 SERPL-SCNC: 29 MMOL/L (ref 22–31)
CREAT SERPL-MCNC: 0.8 MG/DL (ref 0.7–1.3)
EOSINOPHIL # BLD AUTO: 0.1 10E3/UL (ref 0–0.7)
EOSINOPHIL NFR BLD AUTO: 1 %
ERYTHROCYTE [DISTWIDTH] IN BLOOD BY AUTOMATED COUNT: 13.5 % (ref 10–15)
GFR SERPL CREATININE-BSD FRML MDRD: >90 ML/MIN/1.73M2
GLUCOSE BLD-MCNC: 120 MG/DL (ref 70–125)
HCT VFR BLD AUTO: 32.5 % (ref 40–53)
HGB BLD-MCNC: 10.9 G/DL (ref 13.3–17.7)
IMM GRANULOCYTES # BLD: 0 10E3/UL
IMM GRANULOCYTES NFR BLD: 0 %
LYMPHOCYTES # BLD AUTO: 1.1 10E3/UL (ref 0.8–5.3)
LYMPHOCYTES NFR BLD AUTO: 12 %
MAGNESIUM SERPL-MCNC: 2.1 MG/DL (ref 1.8–2.6)
MCH RBC QN AUTO: 32.5 PG (ref 26.5–33)
MCHC RBC AUTO-ENTMCNC: 33.5 G/DL (ref 31.5–36.5)
MCV RBC AUTO: 97 FL (ref 78–100)
MONOCYTES # BLD AUTO: 0.7 10E3/UL (ref 0–1.3)
MONOCYTES NFR BLD AUTO: 8 %
NEUTROPHILS # BLD AUTO: 7.5 10E3/UL (ref 1.6–8.3)
NEUTROPHILS NFR BLD AUTO: 79 %
NRBC # BLD AUTO: 0 10E3/UL
NRBC BLD AUTO-RTO: 0 /100
PLATELET # BLD AUTO: 338 10E3/UL (ref 150–450)
POTASSIUM BLD-SCNC: 3.4 MMOL/L (ref 3.5–5)
POTASSIUM BLD-SCNC: 3.6 MMOL/L (ref 3.5–5)
RBC # BLD AUTO: 3.35 10E6/UL (ref 4.4–5.9)
SODIUM SERPL-SCNC: 145 MMOL/L (ref 136–145)
WBC # BLD AUTO: 9.4 10E3/UL (ref 4–11)

## 2021-09-13 PROCEDURE — 99232 SBSQ HOSP IP/OBS MODERATE 35: CPT | Performed by: NURSE PRACTITIONER

## 2021-09-13 PROCEDURE — 99233 SBSQ HOSP IP/OBS HIGH 50: CPT | Performed by: INTERNAL MEDICINE

## 2021-09-13 PROCEDURE — 94669 MECHANICAL CHEST WALL OSCILL: CPT

## 2021-09-13 PROCEDURE — 999N000157 HC STATISTIC RCP TIME EA 10 MIN

## 2021-09-13 PROCEDURE — 97112 NEUROMUSCULAR REEDUCATION: CPT | Mod: GP | Performed by: PHYSICAL THERAPIST

## 2021-09-13 PROCEDURE — 250N000013 HC RX MED GY IP 250 OP 250 PS 637: Performed by: NURSE PRACTITIONER

## 2021-09-13 PROCEDURE — 85025 COMPLETE CBC W/AUTO DIFF WBC: CPT | Performed by: NURSE PRACTITIONER

## 2021-09-13 PROCEDURE — 250N000013 HC RX MED GY IP 250 OP 250 PS 637: Performed by: HOSPITALIST

## 2021-09-13 PROCEDURE — 999N000123 HC STATISTIC OXYGEN O2DAILY TECH TIME

## 2021-09-13 PROCEDURE — 94640 AIRWAY INHALATION TREATMENT: CPT | Mod: 76

## 2021-09-13 PROCEDURE — 36415 COLL VENOUS BLD VENIPUNCTURE: CPT | Performed by: INTERNAL MEDICINE

## 2021-09-13 PROCEDURE — 250N000009 HC RX 250: Performed by: INTERNAL MEDICINE

## 2021-09-13 PROCEDURE — 250N000011 HC RX IP 250 OP 636

## 2021-09-13 PROCEDURE — 250N000013 HC RX MED GY IP 250 OP 250 PS 637: Performed by: INTERNAL MEDICINE

## 2021-09-13 PROCEDURE — 82310 ASSAY OF CALCIUM: CPT | Performed by: INTERNAL MEDICINE

## 2021-09-13 PROCEDURE — 94640 AIRWAY INHALATION TREATMENT: CPT

## 2021-09-13 PROCEDURE — 97530 THERAPEUTIC ACTIVITIES: CPT | Mod: GP | Performed by: PHYSICAL THERAPIST

## 2021-09-13 PROCEDURE — 999N000009 HC STATISTIC AIRWAY CARE

## 2021-09-13 PROCEDURE — 99232 SBSQ HOSP IP/OBS MODERATE 35: CPT | Performed by: PHYSICIAN ASSISTANT

## 2021-09-13 PROCEDURE — 250N000013 HC RX MED GY IP 250 OP 250 PS 637

## 2021-09-13 PROCEDURE — 120N000001 HC R&B MED SURG/OB

## 2021-09-13 PROCEDURE — 83735 ASSAY OF MAGNESIUM: CPT | Performed by: INTERNAL MEDICINE

## 2021-09-13 PROCEDURE — 84132 ASSAY OF SERUM POTASSIUM: CPT | Performed by: INTERNAL MEDICINE

## 2021-09-13 RX ORDER — METHYLPHENIDATE HYDROCHLORIDE 10 MG/1
10 TABLET ORAL DAILY
Status: DISCONTINUED | OUTPATIENT
Start: 2021-09-14 | End: 2021-09-27 | Stop reason: HOSPADM

## 2021-09-13 RX ORDER — POTASSIUM CHLORIDE 20MEQ/15ML
40 LIQUID (ML) ORAL ONCE
Status: COMPLETED | OUTPATIENT
Start: 2021-09-13 | End: 2021-09-13

## 2021-09-13 RX ORDER — AMLODIPINE BESYLATE 10 MG/1
10 TABLET ORAL DAILY
Status: DISCONTINUED | OUTPATIENT
Start: 2021-09-14 | End: 2021-09-27 | Stop reason: HOSPADM

## 2021-09-13 RX ADMIN — GENTAMICIN SULFATE 1 DROP: 3 SOLUTION/ DROPS OPHTHALMIC at 21:01

## 2021-09-13 RX ADMIN — HYDROCORTISONE: 1 CREAM TOPICAL at 21:01

## 2021-09-13 RX ADMIN — NYSTATIN 500000 UNITS: 100000 SUSPENSION ORAL at 16:19

## 2021-09-13 RX ADMIN — FAMOTIDINE 20 MG: 20 TABLET, FILM COATED ORAL at 20:58

## 2021-09-13 RX ADMIN — AMLODIPINE BESYLATE 10 MG: 10 TABLET ORAL at 08:23

## 2021-09-13 RX ADMIN — ANORECTAL OINTMENT: 15.7; .44; 24; 20.6 OINTMENT TOPICAL at 14:14

## 2021-09-13 RX ADMIN — IPRATROPIUM BROMIDE AND ALBUTEROL SULFATE 3 ML: 2.5; .5 SOLUTION RESPIRATORY (INHALATION) at 07:47

## 2021-09-13 RX ADMIN — VENLAFAXINE 25 MG: 25 TABLET ORAL at 08:24

## 2021-09-13 RX ADMIN — HEPARIN SODIUM 5000 UNITS: 5000 INJECTION, SOLUTION INTRAVENOUS; SUBCUTANEOUS at 14:13

## 2021-09-13 RX ADMIN — GENTAMICIN SULFATE 1 DROP: 3 SOLUTION/ DROPS OPHTHALMIC at 08:24

## 2021-09-13 RX ADMIN — HEPARIN SODIUM 5000 UNITS: 5000 INJECTION, SOLUTION INTRAVENOUS; SUBCUTANEOUS at 06:24

## 2021-09-13 RX ADMIN — NYSTATIN 500000 UNITS: 100000 SUSPENSION ORAL at 08:23

## 2021-09-13 RX ADMIN — FAMOTIDINE 20 MG: 20 TABLET, FILM COATED ORAL at 08:23

## 2021-09-13 RX ADMIN — VENLAFAXINE 50 MG: 25 TABLET ORAL at 16:19

## 2021-09-13 RX ADMIN — Medication 2 PACKET: at 20:59

## 2021-09-13 RX ADMIN — Medication 2 PACKET: at 08:22

## 2021-09-13 RX ADMIN — MIRTAZAPINE 7.5 MG: 7.5 TABLET ORAL at 20:58

## 2021-09-13 RX ADMIN — VENLAFAXINE 50 MG: 25 TABLET ORAL at 13:03

## 2021-09-13 RX ADMIN — NYSTATIN 500000 UNITS: 100000 SUSPENSION ORAL at 20:59

## 2021-09-13 RX ADMIN — NYSTATIN 500000 UNITS: 100000 SUSPENSION ORAL at 13:03

## 2021-09-13 RX ADMIN — VENLAFAXINE 50 MG: 25 TABLET ORAL at 08:23

## 2021-09-13 RX ADMIN — POTASSIUM CHLORIDE 40 MEQ: 20 SOLUTION ORAL at 14:17

## 2021-09-13 RX ADMIN — GENTAMICIN SULFATE 1 DROP: 3 SOLUTION/ DROPS OPHTHALMIC at 14:14

## 2021-09-13 RX ADMIN — ANORECTAL OINTMENT: 15.7; .44; 24; 20.6 OINTMENT TOPICAL at 08:24

## 2021-09-13 RX ADMIN — IPRATROPIUM BROMIDE AND ALBUTEROL SULFATE 3 ML: 2.5; .5 SOLUTION RESPIRATORY (INHALATION) at 21:21

## 2021-09-13 RX ADMIN — HEPARIN SODIUM 5000 UNITS: 5000 INJECTION, SOLUTION INTRAVENOUS; SUBCUTANEOUS at 21:00

## 2021-09-13 RX ADMIN — Medication 2 PACKET: at 14:13

## 2021-09-13 RX ADMIN — METHYLPHENIDATE HYDROCHLORIDE 10 MG: 10 TABLET ORAL at 08:23

## 2021-09-13 RX ADMIN — HYDROCORTISONE: 1 CREAM TOPICAL at 08:24

## 2021-09-13 RX ADMIN — ANORECTAL OINTMENT: 15.7; .44; 24; 20.6 OINTMENT TOPICAL at 21:00

## 2021-09-13 RX ADMIN — ACETYLCYSTEINE 2 ML: 200 SOLUTION ORAL; RESPIRATORY (INHALATION) at 07:47

## 2021-09-13 RX ADMIN — ACETYLCYSTEINE 2 ML: 200 SOLUTION ORAL; RESPIRATORY (INHALATION) at 21:21

## 2021-09-13 ASSESSMENT — MIFFLIN-ST. JEOR: SCORE: 1538.04

## 2021-09-13 NOTE — PLAN OF CARE
Problem: Anxiety  Goal: Anxiety Reduction or Resolution  Outcome: Improving   Patient was calm and cooperative. No signs or symptoms of pain noted. Patient repositioned every two hours. Patient's vital signs were stable. All due cares and medications given.

## 2021-09-13 NOTE — PLAN OF CARE
Problem: Adult Inpatient Plan of Care  Goal: Plan of Care Review  Outcome: No Change     Problem: Adult Inpatient Plan of Care  Goal: Absence of Hospital-Acquired Illness or Injury  Intervention: Identify and Manage Fall Risk  Recent Flowsheet Documentation  Taken 9/13/2021 0820 by Mariam Robles RN  Safety Promotion/Fall Prevention: bed alarm on     Problem: Ventilator-Induced Lung Injury (Mechanical Ventilation, Invasive)  Goal: Absence of Ventilator-Induced Lung Injury  Intervention: Prevent Ventilator-Associated Pneumonia  Recent Flowsheet Documentation  Taken 9/13/2021 0820 by Mariam Robles RN  Oral Care: swabbed with antiseptic solution     Problem: Device-Related Complication Risk (Artificial Airway)  Goal: Optimal Device Function  Intervention: Optimize Device Care and Function  Recent Flowsheet Documentation  Taken 9/13/2021 0820 by Mariam Robles RN  Oral Care: swabbed with antiseptic solution       Pt alert, confused  and oriented to self. Vital signs stable. Pt denied pain. No PRNs given.  Pt spent time up in chair; visited with family via iPad.  Pt on potassium replacement protocol.  All care needs met.

## 2021-09-13 NOTE — PLAN OF CARE
Problem: Device-Related Complication Risk (Artificial Airway)  Goal: Optimal Device Function  Outcome: No Change  Intervention: Optimize Device Care and Function  Recent Flowsheet Documentation  Taken 9/13/2021 0331 by Alpesh Martel,   Airway Safety Measures: all equipment/monitors on and audible  Taken 9/13/2021 0014 by Alpesh Martel, RT  Airway Safety Measures: all equipment/monitors on and audible  Taken 9/12/2021 2100 by Alpesh Martel RT  Airway Safety Measures: all equipment/monitors on and audible     Problem: Skin and Tissue Injury (Artificial Airway)  Goal: Absence of Device-Related Skin or Tissue Injury  Outcome: No Change   Respiratory Care Progress Note    Airway/oxygen:    Current settings: capped, room air  Trach type/size: #6 Bivona placed on 8/26/2021    Actions:    Medications/therapies: Duoneb BID, Mucomyst BID, chest vest BID  Overnight suctioning   Frequency: 4x   Amount: moderate   Consistency: thick   Color: white   Spontaneous effort: good, strong    Weaning phase: 3  Daytime plan: capped  Nighttime plan: capped    Response    Breath sounds: scattered rhonchi  Vital signs: Temp: 96.9  F (36.1  C) Temp src: Axillary BP: 127/84 Pulse: 84   Resp: 20 SpO2: 95 % O2 Device: None (Room air)      Risk for self decannulation: no    Note/Plan: continue plan of care, patient was ordered on Metaneb device on 7/26 until 9/3, at which point he was ordered on chest vest. Query necessity for continued pulmonary toileting at this time, patient does expectorate spontaneously occasionally, has no significant desaturation with routine & PRN suctioning

## 2021-09-13 NOTE — PROGRESS NOTES
RENAL PROGRESS NOTE    HPI: 70 year old male with past medical history of hypertension, admitted with subarachnoid hemorrhage after a fall on 5/15/2021 s/p emergent left external ventricular drain placement with posterior communicating artery aneurysm defied structured underwent craniotomy with clipping of aneurysm , complicated by persistent cerebral vasospasms and bilateral BAYLEE infarct.  Course also complicated by respiratory failure with intubation pneumonia was treated with cefepime and Vanco, transferred to LTAC on 6/11/2021.  Nephrology consulted for hypercalcemia on 8/15/2021     Hypercalcemia: moderate and stable d/t relative immobility with prolonged hospitalization, and CKD/reduced clearance  Corrected Ca ~11  PTH has remained on the low side on multiple checks, vitamin D level 30 8/15/2021  No history suggestive of any lymphoma or lymphadenopathy or any granulomatosis related disorders to suggest any additional etiology.  PLAN:  Avoid Ca/D suppl  Continue free water 250cc q 4 to keep hydrated  Lower Ca TF  Recommend weekly checks unless rising at the moment    HyperK:  Better off ACEi    Volume:  Appears euvolemic despite net +30L by I/Os. I & O  not accurate with condom cath falling off an incontinence. Holding on diuretics, euvolemic. Sodium stable.     CKD 3:  With baseline crea 0.9, 24 hour cr clearance estimates GFR at 45     Hypertension: BP had stable on single agent lisinopril 40 mg, changed to amlodipine 10mg with hyperkalemia.  BP controlled on this     Anemia:  hgb trending 10-11s and stable, likely Inflammatory and malnutrition primary , Mild management per hospitalist medicine,iron stores replete 9/6/21, no obvious bleed      Acute on chronic hypoxemic respiratory failure: trach capped, weaning phase 3 but trach remains d/t copious secretions, keeping a bit on the dry side to promote resp status,   S/p tx ESBL Pseudomonas and Klebsiella pneumonia  Pulmonary following  Currently off  antibiotics     Malnutrition dysphagia after CVA - on tube feeds, adjusted for lower calcium content     SAH w fall sp craniotomy and hematoma evacuation , PCA aneurysm clip  cb BAYLEE stroke ad vasopasm  Now on rehab, speech and OT/PT therapies     Insomnia/mood disorders  On Ritalin and mirtazapine  Management per primary medicine     Metabolic encephalopathy - ongoing           SUBJECTIVE:    Patient more interactive today  Answers questions  Says he feels he is getting stronger  Says his breathing might be a little better today  Able to answer yes or no and some short sentences    Wife not on ipad this AM to update  Hgb and calcium stable  Renal function at baseline  Cont 250 ml free water q4h  B/p controlled on Norvasc   Will see tomorrow     OBJECTIVE:  Physical Exam   Temp: 97.7  F (36.5  C) Temp src: Oral BP: 133/88 Pulse: 97   Resp: 20 SpO2: 91 % O2 Device: None (Room air)    Vitals:    09/11/21 0636 09/12/21 0500 09/13/21 0631   Weight: 70.8 kg (156 lb) 72.1 kg (159 lb) 74.6 kg (164 lb 8 oz)     Vital Signs with Ranges  Temp:  [96.9  F (36.1  C)-98  F (36.7  C)] 97.7  F (36.5  C)  Pulse:  [84-98] 97  Resp:  [18-24] 20  BP: (119-138)/(62-88) 133/88  SpO2:  [91 %-96 %] 91 %  I/O last 3 completed shifts:  In: 3149 [NG/GT:3149]  Out: 1050 [Urine:1050]      Patient Vitals for the past 72 hrs:   Weight   09/13/21 0631 74.6 kg (164 lb 8 oz)   09/12/21 0500 72.1 kg (159 lb)   09/11/21 0636 70.8 kg (156 lb)       Intake/Output Summary (Last 24 hours) at 8/30/2021 1049  Last data filed at 8/30/2021 0519  Gross per 24 hour   Intake 3693 ml   Output 400 ml   Net 3293 ml       PHYSICAL EXAM:  General - Alert, answers question, sitting up in bed, mitts off hands   Cardiovascular - Regular rate and rhythm , BP controlled   Respiratory -trach capped, on RA, lungscourse, sats good  On RA  Extremities - No lower extremity edema bilaterally  Neuro:  Exam limited, doesn't follow commands, answers questions, moving  exgtremities  GI:  TF infusing  MSK:  Grossly intact, but global debit.   Psych:  flat affect      LABORATORY STUDIES:     Recent Labs   Lab 09/13/21  0704   WBC 9.4   RBC 3.35*   HGB 10.9*   HCT 32.5*          Basic Metabolic Panel:  Recent Labs   Lab 09/13/21  0704 09/10/21  0634 09/09/21  0658 09/08/21  0629 09/07/21  0641 09/06/21  1649    146* 145 144 146* 146*   POTASSIUM 3.4* 4.0 4.2 4.0 4.6 4.7   CHLORIDE 105 104 104 104 107 104   CO2 29 31 28 28 30 30   BUN 37* 49* 46* 43* 49* 51*   CR 0.80 0.86 0.86 0.83 0.85 0.87    106 117 121 116 114   RAGINI 10.9* 11.1* 11.0* 11.1* 11.1* 11.1*       INRNo lab results found in last 7 days.     Recent Labs   Lab Test 09/13/21  0704 09/06/21  0700 05/16/21  0405 05/15/21  2125 05/15/21  1222   INR  --   --   --  1.20* 1.16*   WBC 9.4 9.6   < > 12.3* 16.5*   HGB 10.9* 11.5*   < > 11.8* 13.6    314   < > 239 257    < > = values in this interval not displayed.       Personally reviewed current labs    Elpidio Rowley PA-C  [facility]  [phone]

## 2021-09-13 NOTE — PROGRESS NOTES
Psychiatric Progress Note  Mild intermittent metabolic encephalopathy fluctuating in the context of prolonged hospitalization, subarachnoid hemorrhage, respiratory failure with hypoxia complex medical condition.  Depression and anxiety.  Cognitive and behavioral changes due to above.  Impulsivity, restlessness and agitation.  Sleep difficulties.     Recommendations:  Efforts at sleep regulation.  Gentle redirection and reassurance.  Ritalin 10 mg in the morning.  Venlafaxine 75 mg AM, and 50 mg BID  Mirtazapine 7.5 mg at bedtime.  Consider Seroquel 12.5 mg 4 times a day as needed for anxiety.      SUBJECTIVE:  Comfortable, cooperative.  Doing better with an increase in Venlafaxine and with discontinuation of Nuvigil  Participating in therapies, calm, compliant with cares.  Providing appropriate responses to short direct questions.  Able to express some of his needs.    MENTAL STATUS EXAMINATION:   Not in acute distress.  Mouthing words.  Thought process is slow.  Thought content shows no psychosis, hallucinations or delusions.  No julita or hypomania.  Judgment, insight, memory, attention, comprehension, fund of knowledge are improving.  Language is intact.  Affect is neutral, mood congruent.  No thoughts of dying.  Vital signs in last 24 hours  Temp:  [96.9  F (36.1  C)-98  F (36.7  C)] 97.7  F (36.5  C)  Pulse:  [84-98] 86  Resp:  [18-24] 20  BP: (119-133)/(83-88) 133/88  SpO2:  [91 %-96 %] 95 %

## 2021-09-13 NOTE — PROGRESS NOTES
[facility]    Medicine Progress Note - Hospitalist Service       Date of Admission:  6/11/2021    Summary:  Dayron Neri is a 70 year male with h/o HTN who presented to ED on 5/15/2021 with acute SAH after a fall with unresponsiveness.  He underwent emergent left external ventricular drain placement.  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  On 5/28/2021, patient had persistent cerebral vasospasm, CT head showed bilateral BAYLEE infarct and was treated with milrinone and verapamil.  On 6/1/2021, angiogram showed no evidence of vasospasm.  Extubated on 6/3/2021.  6/6/2021 he was reintubated due to hypoxia.  On 6/7/2021, patient underwent tracheostomy and PEG tube placement.  EVD removed 6/9/2021.   Patient was treated for possible pneumonia with Unasyn on 5/20/2021, It was switched to ceftriaxone the next day for sputum culture growing Klebsiella. On 5/23, sputum culture also grew Pseudomonas. Antibiotic was switched to Zosyn.  Due to increased WBCs in CSF, possibility of ventriculitis was raised. CSF culture was negative. Antibiotic was switched to cefepime and vancomycin on 5/28/2021 for 2 weeks.  Vanco was discontinued on 6/9/2021. On 6/5/2021, sputum grew ESBL so cefepime was switched to meropenem. He was transferred to [facility] on 6/11/21.     Since admission to [facility], patient did not make significant progression for his neurology status. Trach weaning has been very slow due to his cognitive impairment.       9/13:    Respiratory status stable, on phase 3 weaning, not ready for decannulation yet  Copious amount of secretions    Neuro status stable    Stopped neuvigil and started ritalin 9/11    Some redness in eyes - started gentamycin eye drops  Rash around neck - started hydrocortisone cream  Nystatin powder oral for oral thrush    Seems medically stable for discharge now, updated wife and answered questions.  No other new issues  noted today  Will continue to follow    BARRIERS TO DISCHARGE : placement to SNF with trach  care        Assessment & Plan      Acute on chronic hypoxemic respiratory failure: S/p treatment pseudomonas ESBL and Klebsiella pneumonia. Has excessive secretions. Weaning has been slow. Per pulmonary, his cognition prevents him from improving further. His pulmonary status could improve more if his cognition improves.   - Has been capped on room air since 8/31. Per pulmonary, no plan for decannulation due to excessive secretions.   - Continue to wean per RT and pulmonary.   - Airway secretion: Glycopyrrolate was tried and stopped. 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 Last CT head done on 7/1/21, which shows resolution of previous multicompartment hemorrhage in the right frontal lobe and the ventricles.   - Need follow up with Dr. Martinez with Neurosurgery in 3 months with repeat CTA of head and neck for post-op f/u. Next CTA of head and neck mid September.   -Given patient is somnolent/lethargic today, will order CT head.    Acute metabolic encephalopathy/cognitive impairment: His current neurology status is likely his new baseline due to frontal lobe injury.  Wife is still hopeful he will improve further but it is unclear if he has the capacity to focus and learn what they are attempting to teach him during therapy.  - Nuvigil started on 8/27 per neurology recommendation  - Patient continues to pull his IV lines and trach, which requires bilateral mitten restraint. Per nursing staff, patient has hallucination. Psy following.    - Continue Mirtazapine,Venlafaxine. ,     Dysphagia: On tube feeds and NPO.   - Speech therapy following. Per note from 8/17/21,  "despite several weeks of working with patient there has been \"no functional improvement in patient's ability to manage his oropharyngeal secretions.\" Speech discussed this with wife. Intensive dysphagia treatment has been discontinued.     Severe malnutrition: has moderate/severe subcutaneous fat loss, moderate/severe muscle loss  - continuous tube feeding per dietician     Chronic kidney disease (CKD) stage 3a: Creatinine has been stable.    Anemia: hemoglobin stable. Continue to monitor.    Essential hypertension: BP controlled currently. Some high readings noted yest. . On amlodipine.    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 Sputum culture 6/19 showed MDR pseudomonas. On Tobramycin nebs 6/24/2021 - 7/9/2021.   - Continue pulmonary toilet, scheduled duonebs    Ventilator associated pneumonia: Completed antibiotics on July 18.    Severe debility, weakness, critical illness, deconditioning: Continue PT/OT    Hypercalcemia: unclear etiology, mild at this point. Parathyroid hormone normal. May be related to relative immobilization. Continue to monitor. Appreciate renal input.    Insomnia: Continue nighttime mirtazapine 7.5 mg scheduled.    Left inguinal hernia: US on 6/22/21 shows large left inguinal hernia containing mesenteric fat and bowel, extending to the superior scrotum, no evidence of intratesticular mass. Currently has no acute issue and no intervention indicated.       Diet: Adult Formula Drip Feeding: Continuous Novasource Renal; Gastrostomy; Goal Rate: 65; mL/hr; Medication - Feeding Tube Flush Frequency: At least 15-30 mL water before and after medication administration and with tube clogging; Amount to Send (Nutri...    DVT Prophylaxis: Heparin SQ  Gutierrez Catheter: Not present  Central Lines: None  Code Status: Full Code      Disposition Plan   Expected discharge: to be determined    The patient's care was discussed with RN, MARY KATE/KT. " Updated patients wife over the phone.     Isreal Granda MD  Hospitalist Service  [facility]      ______________________________________________________________________    Physical Exam   Vital Signs: Temp: 97.7  F (36.5  C) Temp src: Oral BP: 133/88 Pulse: 86   Resp: 20 SpO2: 95 % O2 Device: None (Room air)    Weight: 164 lbs 8 oz    General appearance: Not in acute distress  HEENT:  Trach in place.  Lungs: Clear breath sounds in bilateral lung fields  Cardiovascular: Regular rate and rhythm, normal S1-S2  Abdomen: Soft, non tender, no distension  Neurology: Somnolent, hard to arouse.       Data   Recent Labs   Lab 09/13/21  0704 09/10/21  0634 09/09/21  0658   WBC 9.4  --   --    HGB 10.9*  --   --    MCV 97  --   --      --   --     146* 145   POTASSIUM 3.4* 4.0 4.2   CHLORIDE 105 104 104   CO2 29 31 28   BUN 37* 49* 46*   CR 0.80 0.86 0.86   ANIONGAP 11 11 13   RAGINI 10.9* 11.1* 11.0*    106 117   ALBUMIN  --  3.3* 3.2*       Restraint:    Patient pulls his IV line and trach.     Within an hour after restraint an in person face to face assessment was completed, including an evaluation of the patient's immediate reaction to the intervention, behavioral assessment and review/assessment of history, drugs and medications, recent labs, etc., and behavioral condition.  The patient experienced: No adverse physical outcome from seclusion/restraint initiation.  The intervention of restraint or seclusion needs to continue.

## 2021-09-13 NOTE — PLAN OF CARE
Plan of Care by Jacqueline Vieyra RN at 2021  9:23 AM     Author: Jacqueline Vieyra RN Service: -- Author Type: RN, Care Manager    Filed: 2021  9:33 AM Date of Service: 2021  9:23 AM Status: Signed    : Jacqueline Vieyra RN (RN, Care Manager)       Care Management Progression of Care Update        DR GLOS - Target D/C Date TBD        PLAN/GOALS  1. Pulmonary following. Phase 3 wean~continous trach capping on room air, tolerated PMV days.  Mucomyst, Duo-neb, two times a day for pulmonary hygiene. Chest vest two time a day.  Frequent suctioning 2-4x/shift.    2.  Nutrition management.  Tube feeding via PEG placed 21.  Registered dietician to monitor tube feeding tolerance, weight and labs.    3.  Neurology following intermittently.      4.  Nephrology following for stage 3 chronic kidney disease.    4. PT/OT 3x/week.     5.  Palliative following.    6.  Psychiatry following.             BARRIERS  1.  Acute hypoxic respiratory failure due to subarachnoid hemorrhage. Trach wean.    2.  Traumatic brain injury/intracranial hemorrhage/acute ischemic stroke.    3.  Oropharyngeal dysphagia.    4.  Cognition impairment.    5.  Bed availability for placement.      Disposition:  Skilled nursing with Trach care.  Care Manager Name:  Jacqueline Vieyra RN,BSN, HNB-BC    Date/Time:  21 @ 12:47 PM

## 2021-09-13 NOTE — PLAN OF CARE
"  Problem: Device-Related Complication Risk (Artificial Airway)  Goal: Optimal Device Function  Outcome: No Change     Problem: Skin and Tissue Injury (Artificial Airway)  Goal: Absence of Device-Related Skin or Tissue Injury  Outcome: No Change     Problem: Communication Impairment (Artificial Airway)  Goal: Effective Communication  Outcome: Improving   RT PROGRESS NOTE     DATA:     CURRENT SETTINGS:             TRACH TYPE / SIZE:  #6 bivona changed 8/29             MODE:   capped on RA continuous              ACTION:             THERAPIES:   duo neb and mucomyst bid, vest bid             SUCTION:                           FREQUENCY:   x3 pt desaturating when suction required.                        AMOUNT:   copious                        CONSISTENCY:   thick tenacious                        COLOR:   pale yellow             SPONTANEOUS COUGH EFFORT/STRENGTH OF EFFORT (not elicited by suctioning): moderate to strong, pt does no cough on command                              WEANING PHASE:   3                        WEAN MODE:    capped continuous                                                  RESPONSE:             BS:   diminished             VITAL SIGNS:   Blood pressure 115/76, pulse 81, temperature 97.9  F (36.6  C), temperature source Oral, resp. rate 24, height 1.803 m (5' 10.98\"), weight 74.6 kg (164 lb 8 oz), SpO2 95 %.                 EMOTIONAL NEEDS / CONCERNS:  no                RISK FOR SELF DECANNULATION:  no                                                 NOTE / PLAN:   Pt continues to sleep with mouth open and requires good oral cares to moisten upper airway.  Pt needs good oral suction and cleaning each morning.  Will continue pulmonary toilet.    "

## 2021-09-13 NOTE — PLAN OF CARE
Patient was up on the chair and assisted back to bed. Patient alert follow commands.denied any pain or discomfort. Will continue to monitor.

## 2021-09-13 NOTE — PLAN OF CARE
Problem: Device-Related Complication Risk (Artificial Airway)  Goal: Optimal Device Function  Outcome: No Change     Problem: Skin and Tissue Injury (Artificial Airway)  Goal: Absence of Device-Related Skin or Tissue Injury  Outcome: No Change     Problem: Communication Impairment (Artificial Airway)  Goal: Effective Communication  Outcome: No Change   Respiratory Care Progress Note    Airway/oxygen:    Current settings: capped, room air  Trach type/size: #6 Bivona placed on 8/26/2021    Actions:    Medications/therapies: Duoneb BID, Mucomyst BID, chest vest BID  Overnight suctioning   Frequency: 4x   Amount: large   Consistency: thick   Color: opaque   Spontaneous effort: strong    Weaning phase: 3  Daytime plan: capped  Nighttime plan: capped  Total wean time: capped since 8/31    Response    Breath sounds: diminished, scattered rhonchi  Vital signs: Temp: 97.9  F (36.6  C) Temp src: Axillary BP: (!) 141/75 Pulse: 80   Resp: 20 SpO2: 95 % O2 Device: None (Room air)      Risk for self decannulation: no    Note/Plan: continue plan of care

## 2021-09-13 NOTE — PROGRESS NOTES
"Pulmonary Medicine Follow up Note - Ventilator Rounds:    Clinical status discussed today with  respiratory therapist.    Chief complaint: Ongoing respiratory failure  Significant change in clinical status during past 24 hours? No      FiO2 (%): 21 %  Resp: 20    Tracheal secretions: x 4 via trach last night  moderate to large    Current phase of ventilator weaning pathway:  Phase 3 since 8/31  Has copious amount of secretions      Physical exam     /88 (BP Location: Left arm)   Pulse 97   Temp 97.7  F (36.5  C) (Oral)   Resp 20   Ht 1.803 m (5' 10.98\")   Wt 74.6 kg (164 lb 8 oz)   SpO2 91%   BMI 22.95 kg/m      Gen: tracks intermittently,  up in chair  HEENT: AT/NC. Trach in place. Capped up in chair   Lungs: diminished ant otherwise clear  CV: regular, no murmurs or gallops appreciated  Abdomen: soft, NT, BS wnl  Ext: no edema  Neuro: awake, tracking intermittently, smiles. Leans to his Right.          Diagnosis:     Patient is a 69 yo man with history of hypertension. Patient initially presented on 15-May-2021 to [facility] after being found unresponsive by his wife. Patient was then found to have a subarachnoid hemorrhage secondary to a ruptured aneurysm. Patient was intubated and was started on mannitol, IV antihypertensives and hyperventilation and patient was then transferred to [facility]. Patient underwent aneurysm clipping as well as placement of extra-ventricular drain from hydrocephalus on 15-May-2021. Extra-ventricular drain would malfunction and would require replacement on 04-Jun-2021 and 06-Jun-2021. Extra-ventricular drain reportedly was removed on 09-Jun-2021. Patient had intra-arterial vasospasm treatment with verapamil on 28-May-2021.  Patient was extubated on 16-May-2021 but had to be reintubated on 19-May-2021. Patient would be extubated on 03-Jun-2021 but would be reintubated on 06-Jun-2021. Patient had tracheostomy and PEG-tube placed on " 07-Jun-2021. Patient required treatment for hospital-acquired pneumonia. Patient was transferred to LTAC on 11-Jun-2021.    1. Acute respiratory failure s/p trach 6/7/2021  2. S/p treatment pseudomonas ESBL and klebsiella pneumonia   3. Encephalopathy   4. Subarachnoid bleed s/p craniotomy and clipping P-comm rupture aneurysm.   5. HTN  6. Malnourishment   7. Dysphagia s/p G tube    Recommendations/Plans (MDM):  1. Weaning orders: phase 3  Will not decannulate yet, copious amount of secretions per RT    2. Trach change? 8/29  Due routine changes and cares   3. Diagnostic testing? no  4. Continue pulmonary toiletting, scheduled ipratropium-albuterol, acetylcysteine, metaneb.  5. I had meeting with pt, his wife(via IPAD) and Dr Granda  Pt is trach dependent for now  Needs a SNF that can care for trachs  He has too many secretions that he can not cough  Out on his own       [facility]IS[facility] Pulm/CC

## 2021-09-14 ENCOUNTER — APPOINTMENT (OUTPATIENT)
Dept: SPEECH THERAPY | Facility: CLINIC | Age: 71
DRG: 207 | End: 2021-09-14
Attending: HOSPITALIST
Payer: COMMERCIAL

## 2021-09-14 ENCOUNTER — APPOINTMENT (OUTPATIENT)
Dept: PHYSICAL THERAPY | Facility: CLINIC | Age: 71
DRG: 207 | End: 2021-09-14
Attending: HOSPITALIST
Payer: COMMERCIAL

## 2021-09-14 LAB — POTASSIUM BLD-SCNC: 3.6 MMOL/L (ref 3.5–5)

## 2021-09-14 PROCEDURE — 99233 SBSQ HOSP IP/OBS HIGH 50: CPT | Performed by: INTERNAL MEDICINE

## 2021-09-14 PROCEDURE — 250N000013 HC RX MED GY IP 250 OP 250 PS 637: Performed by: NURSE PRACTITIONER

## 2021-09-14 PROCEDURE — 99232 SBSQ HOSP IP/OBS MODERATE 35: CPT | Performed by: PHYSICIAN ASSISTANT

## 2021-09-14 PROCEDURE — 250N000009 HC RX 250: Performed by: INTERNAL MEDICINE

## 2021-09-14 PROCEDURE — 92507 TX SP LANG VOICE COMM INDIV: CPT | Mod: GN | Performed by: SPEECH-LANGUAGE PATHOLOGIST

## 2021-09-14 PROCEDURE — 120N000001 HC R&B MED SURG/OB

## 2021-09-14 PROCEDURE — 84132 ASSAY OF SERUM POTASSIUM: CPT | Performed by: INTERNAL MEDICINE

## 2021-09-14 PROCEDURE — 94640 AIRWAY INHALATION TREATMENT: CPT | Mod: 76

## 2021-09-14 PROCEDURE — 250N000013 HC RX MED GY IP 250 OP 250 PS 637: Performed by: INTERNAL MEDICINE

## 2021-09-14 PROCEDURE — 999N000157 HC STATISTIC RCP TIME EA 10 MIN

## 2021-09-14 PROCEDURE — 94669 MECHANICAL CHEST WALL OSCILL: CPT

## 2021-09-14 PROCEDURE — 97530 THERAPEUTIC ACTIVITIES: CPT | Mod: GP | Performed by: PHYSICAL THERAPIST

## 2021-09-14 PROCEDURE — 94668 MNPJ CHEST WALL SBSQ: CPT

## 2021-09-14 PROCEDURE — 250N000013 HC RX MED GY IP 250 OP 250 PS 637: Performed by: HOSPITALIST

## 2021-09-14 PROCEDURE — 94640 AIRWAY INHALATION TREATMENT: CPT

## 2021-09-14 PROCEDURE — 999N000009 HC STATISTIC AIRWAY CARE

## 2021-09-14 PROCEDURE — 36415 COLL VENOUS BLD VENIPUNCTURE: CPT | Performed by: INTERNAL MEDICINE

## 2021-09-14 PROCEDURE — 250N000013 HC RX MED GY IP 250 OP 250 PS 637

## 2021-09-14 PROCEDURE — 250N000011 HC RX IP 250 OP 636

## 2021-09-14 RX ADMIN — NYSTATIN 500000 UNITS: 100000 SUSPENSION ORAL at 16:54

## 2021-09-14 RX ADMIN — Medication 2 PACKET: at 08:01

## 2021-09-14 RX ADMIN — VENLAFAXINE 50 MG: 25 TABLET ORAL at 16:53

## 2021-09-14 RX ADMIN — AMLODIPINE BESYLATE 10 MG: 10 TABLET ORAL at 08:00

## 2021-09-14 RX ADMIN — FAMOTIDINE 20 MG: 20 TABLET, FILM COATED ORAL at 08:00

## 2021-09-14 RX ADMIN — ANORECTAL OINTMENT: 15.7; .44; 24; 20.6 OINTMENT TOPICAL at 20:55

## 2021-09-14 RX ADMIN — Medication 2 PACKET: at 13:51

## 2021-09-14 RX ADMIN — METHYLPHENIDATE HYDROCHLORIDE 10 MG: 5 TABLET ORAL at 08:00

## 2021-09-14 RX ADMIN — HEPARIN SODIUM 5000 UNITS: 5000 INJECTION, SOLUTION INTRAVENOUS; SUBCUTANEOUS at 13:51

## 2021-09-14 RX ADMIN — IPRATROPIUM BROMIDE AND ALBUTEROL SULFATE 3 ML: 2.5; .5 SOLUTION RESPIRATORY (INHALATION) at 19:30

## 2021-09-14 RX ADMIN — ACETYLCYSTEINE 2 ML: 200 SOLUTION ORAL; RESPIRATORY (INHALATION) at 19:30

## 2021-09-14 RX ADMIN — FAMOTIDINE 20 MG: 20 TABLET, FILM COATED ORAL at 20:54

## 2021-09-14 RX ADMIN — HYDROCORTISONE: 1 CREAM TOPICAL at 20:56

## 2021-09-14 RX ADMIN — IPRATROPIUM BROMIDE AND ALBUTEROL SULFATE 3 ML: 2.5; .5 SOLUTION RESPIRATORY (INHALATION) at 07:30

## 2021-09-14 RX ADMIN — GENTAMICIN SULFATE 1 DROP: 3 SOLUTION/ DROPS OPHTHALMIC at 20:54

## 2021-09-14 RX ADMIN — VENLAFAXINE 25 MG: 25 TABLET ORAL at 06:30

## 2021-09-14 RX ADMIN — Medication 2 PACKET: at 20:54

## 2021-09-14 RX ADMIN — NYSTATIN 500000 UNITS: 100000 SUSPENSION ORAL at 08:00

## 2021-09-14 RX ADMIN — VENLAFAXINE 50 MG: 25 TABLET ORAL at 08:00

## 2021-09-14 RX ADMIN — MIRTAZAPINE 7.5 MG: 7.5 TABLET ORAL at 20:54

## 2021-09-14 RX ADMIN — HEPARIN SODIUM 5000 UNITS: 5000 INJECTION, SOLUTION INTRAVENOUS; SUBCUTANEOUS at 22:45

## 2021-09-14 RX ADMIN — ANORECTAL OINTMENT: 15.7; .44; 24; 20.6 OINTMENT TOPICAL at 13:51

## 2021-09-14 RX ADMIN — GENTAMICIN SULFATE 1 DROP: 3 SOLUTION/ DROPS OPHTHALMIC at 08:02

## 2021-09-14 RX ADMIN — NYSTATIN 500000 UNITS: 100000 SUSPENSION ORAL at 20:54

## 2021-09-14 RX ADMIN — GENTAMICIN SULFATE 1 DROP: 3 SOLUTION/ DROPS OPHTHALMIC at 13:51

## 2021-09-14 RX ADMIN — NYSTATIN 500000 UNITS: 100000 SUSPENSION ORAL at 12:20

## 2021-09-14 RX ADMIN — ANORECTAL OINTMENT: 15.7; .44; 24; 20.6 OINTMENT TOPICAL at 08:01

## 2021-09-14 RX ADMIN — ACETYLCYSTEINE 2 ML: 200 SOLUTION ORAL; RESPIRATORY (INHALATION) at 07:31

## 2021-09-14 RX ADMIN — HEPARIN SODIUM 5000 UNITS: 5000 INJECTION, SOLUTION INTRAVENOUS; SUBCUTANEOUS at 06:27

## 2021-09-14 RX ADMIN — HYDROCORTISONE: 1 CREAM TOPICAL at 08:02

## 2021-09-14 RX ADMIN — VENLAFAXINE 50 MG: 25 TABLET ORAL at 12:20

## 2021-09-14 ASSESSMENT — MIFFLIN-ST. JEOR: SCORE: 1542.12

## 2021-09-14 NOTE — PROGRESS NOTES
[facility]    Medicine Progress Note - Hospitalist Service       Date of Admission:  6/11/2021    Summary:  Dayron Neri is a 70 year male with h/o HTN who presented to ED on 5/15/2021 with acute SAH after a fall with unresponsiveness.  He underwent emergent left external ventricular drain placement.  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  On 5/28/2021, patient had persistent cerebral vasospasm, CT head showed bilateral BAYLEE infarct and was treated with milrinone and verapamil.  On 6/1/2021, angiogram showed no evidence of vasospasm.  Extubated on 6/3/2021.  6/6/2021 he was reintubated due to hypoxia.  On 6/7/2021, patient underwent tracheostomy and PEG tube placement.  EVD removed 6/9/2021.   Patient was treated for possible pneumonia with Unasyn on 5/20/2021, It was switched to ceftriaxone the next day for sputum culture growing Klebsiella. On 5/23, sputum culture also grew Pseudomonas. Antibiotic was switched to Zosyn.  Due to increased WBCs in CSF, possibility of ventriculitis was raised. CSF culture was negative. Antibiotic was switched to cefepime and vancomycin on 5/28/2021 for 2 weeks.  Vanco was discontinued on 6/9/2021. On 6/5/2021, sputum grew ESBL so cefepime was switched to meropenem. He was transferred to [facility] on 6/11/21.     Since admission to [facility], patient did not make significant progression for his neurology status. Trach weaning has been very slow due to his cognitive impairment.         9/12 :      Some redness in eyes - started gentamycin eye drops  Rash around neck - started hydrocortisone cream  Nystatin powder oral for oral thrush        9/14:    Respiratory status stable, on phase 3 weaning - chronic trach    Neuro status stable    Stopped neuvigil and started trial of  ritalin 9/11 which patient had been in the past - patient's wife thinks that patient's consciousness levels are better with  ritalin        Seems medically stable for discharge now, updated wife and answered questions.  No other new issues noted today  Will continue to follow      BARRIERS TO DISCHARGE :  placement to SNF with [facility]  care        Assessment & Plan      Acute on chronic hypoxemic respiratory failure: S/p treatment pseudomonas ESBL and Klebsiella pneumonia. Has excessive secretions. Weaning has been slow. Per pulmonary, his cognition prevents him from improving further. His pulmonary status could improve more if his cognition improves.   - Has been capped on room air since 8/31. Per pulmonary, no plan for decannulation due to excessive secretions.   - Continue to wean per RT and pulmonary.   - Airway secretion: Glycopyrrolate was tried and stopped. 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 Last CT head done on 7/1/21, which shows resolution of previous multicompartment hemorrhage in the right frontal lobe and the ventricles.   - Need follow up with Dr. Martinez with Neurosurgery in 3 months with repeat CTA of head and neck for post-op f/u. Next CTA of head and neck mid September.   -Given patient is somnolent/lethargic today, will order CT head.    Acute metabolic encephalopathy/cognitive impairment: His current neurology status is likely his new baseline due to frontal lobe injury.  Wife is still hopeful he will improve further but it is unclear if he has the capacity to focus and learn what they are attempting to teach him during therapy.  - Nuvigil started on 8/27 per neurology recommendation  - Patient continues to pull his IV lines and trach, which requires bilateral mitten restraint. Per nursing staff, patient has hallucination. Psy following.    - Continue  "Mirtazapine,Venlafaxine. ,     Dysphagia: On tube feeds and NPO.   - Speech therapy following. Per note from 8/17/21, despite several weeks of working with patient there has been \"no functional improvement in patient's ability to manage his oropharyngeal secretions.\" Speech discussed this with wife. Intensive dysphagia treatment has been discontinued.     Severe malnutrition: has moderate/severe subcutaneous fat loss, moderate/severe muscle loss  - continuous tube feeding per dietician     Chronic kidney disease (CKD) stage 3a: Creatinine has been stable.    Anemia: hemoglobin stable. Continue to monitor.    Essential hypertension: BP controlled currently. Some high readings noted yest. . On amlodipine.    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 Sputum culture 6/19 showed MDR pseudomonas. On Tobramycin nebs 6/24/2021 - 7/9/2021.   - Continue pulmonary toilet, scheduled duonebs    Ventilator associated pneumonia: Completed antibiotics on July 18.    Severe debility, weakness, critical illness, deconditioning: Continue PT/OT    Hypercalcemia: unclear etiology, mild at this point. Parathyroid hormone normal. May be related to relative immobilization. Continue to monitor. Appreciate renal input.    Insomnia: Continue nighttime mirtazapine 7.5 mg scheduled.    Left inguinal hernia: US on 6/22/21 shows large left inguinal hernia containing mesenteric fat and bowel, extending to the superior scrotum, no evidence of intratesticular mass. Currently has no acute issue and no intervention indicated.       Diet: Adult Formula Drip Feeding: Continuous Novasource Renal; Gastrostomy; Goal Rate: 65; mL/hr; Medication - Feeding Tube Flush Frequency: At least 15-30 mL water before and after medication administration and with tube clogging; Amount to Send (Nutri...    DVT Prophylaxis: Heparin SQ  Gutierrez Catheter: Not present  Central Lines: None  Code Status: Full Code  "     Disposition Plan   Expected discharge: to be determined    The patient's care was discussed with RN, SW/KT. Updated patients wife over the phone.     Isreal Granda MD  Hospitalist Service  [facility]      ______________________________________________________________________    Physical Exam   Vital Signs: Temp: 97.9  F (36.6  C) Temp src: Axillary BP: 107/63 Pulse: 90   Resp: 24 SpO2: 98 % O2 Device: None (Room air)    Weight: 165 lbs 6.4 oz    General appearance: Not in acute distress  HEENT:  Trach in place.  Lungs: Clear breath sounds in bilateral lung fields  Cardiovascular: Regular rate and rhythm, normal S1-S2  Abdomen: Soft, non tender, no distension  Neurology: Somnolent, hard to arouse.       Data   Recent Labs   Lab 09/14/21  0637 09/13/21  1915 09/13/21  0704 09/10/21  0634 09/09/21  0658   WBC  --   --  9.4  --   --    HGB  --   --  10.9*  --   --    MCV  --   --  97  --   --    PLT  --   --  338  --   --    NA  --   --  145 146* 145   POTASSIUM 3.6 3.6 3.4* 4.0 4.2   CHLORIDE  --   --  105 104 104   CO2  --   --  29 31 28   BUN  --   --  37* 49* 46*   CR  --   --  0.80 0.86 0.86   ANIONGAP  --   --  11 11 13   RAGINI  --   --  10.9* 11.1* 11.0*   GLC  --   --  120 106 117   ALBUMIN  --   --   --  3.3* 3.2*       Restraint:    Patient pulls his IV line and trach.     Within an hour after restraint an in person face to face assessment was completed, including an evaluation of the patient's immediate reaction to the intervention, behavioral assessment and review/assessment of history, drugs and medications, recent labs, etc., and behavioral condition.  The patient experienced: No adverse physical outcome from seclusion/restraint initiation.  The intervention of restraint or seclusion needs to continue.

## 2021-09-14 NOTE — PLAN OF CARE
Problem: Anxiety  Goal: Anxiety Reduction or Resolution  Outcome: Improving     Pt had an uneventful night.  Pt was pleasant, cooperative with cares, and did not appear anxious.  Vitals were stable.

## 2021-09-14 NOTE — PROGRESS NOTES
CLINICAL NUTRITION SERVICES - REASSESSMENT NOTE      RECOMMENDATIONS FOR MD/PROVIDER TO ORDER:   Nephrology to continue to manage fluids   Recommendations Ordered by Registered Dietitian (RD):   Continue current TF regimen as ordered   Malnutrition:   % Weight Loss:  Weight loss does not meet criteria for malnutrition - fluid shifts  % Intake:  No decreased intake noted  Subcutaneous Fat Loss:  Orbital region severe depletion and Upper arm region severe depletion  Muscle Loss:  Temporal region moderate depletion, Clavicle bone region severe depletion, Acromion bone region severe depletion, Dorsal hand region mild depletion, Anterior thigh region severe depletion and Posterior calf region moderate-severe depletion  Fluid Retention:  Mild - no visual signs of edema, weight up 10# in 7 days    Malnutrition Diagnosis: Severe malnutrition  In Context of:  Chronic illness or disease     EVALUATION OF PROGRESS TOWARD GOALS   Diet:  None    Nutrition Support:       Adult Formula Novasource Renal    Route Gastrostomy    Goal Rate 65    Goal Units mL/hr      Free Water Route Gastric    Free Water - Feeding Tube Flush Frequency Q 4 Hours    Free Water Volume Other    Specify 250 mL    2 pkts Nutrisource fiber 3x/day    Total Enteral Provisions: 1560 mL daily provides 3210 kcal (46 kcal per kg), 142g protein (2g per kg), 285g CHO, 18g fiber and 1120mL free water. Meets 100% of DRI's.   Free Water Flush: standard 250 mL q4 hours   TF + fluid flush = 2620 mL per day   1564 mg K/day   1310 mg Ca/day     Intake/Tolerance:  Pt. Tolerating new formula - at goal rate    ASSESSED NUTRITION NEEDS:  Dosing Weight:  69.6 kg    NEW FINDINGS:   - tolerating TF  - weight up since 9/7 (153#), now 165# today 9/14  - nephrology managing TF flushes  - per MD note, ready for SNF pending availability and trach needs    Previous Goals:   Pt. To meet goal rate within 48 hours.  Evaluation: Met    Previous Nutrition Diagnosis:   Impaired nutrient  utilization related to CKD as evidenced by need for renal TF formula with lower Ca and K.  Evaluation: Improving    CURRENT NUTRITION DIAGNOSIS  Impaired nutrient utilization related to CKD as evidenced by continued need for renal TF formula to help maintain mineral homeostasis.    INTERVENTIONS  Recommendations / Nutrition Prescription  Continue current TF regimen as ordered  Nephrology to continue to manage fluids    Implementation  EN Composition, EN Schedule and Feeding Tube Flush    Goals  Tolerate TF  Maintain weight - minimize fluid retention    MONITORING AND EVALUATION:  Progress towards goals will be monitored and evaluated per protocol and Practice Guidelines    Dulce Gonzales RDN, LD  Clinical Dietitian

## 2021-09-14 NOTE — PLAN OF CARE
Pt. Was up to chair and completed therapy. Denied pain throughout shift, but new orders asks to offer PRN Tylenol before therapy. Wife would like him up again during evening shift.       Problem: Anxiety  Goal: Anxiety Reduction or Resolution  Outcome: Improving  Intervention: Promote Anxiety Reduction  Recent Flowsheet Documentation  Taken 9/14/2021 0807 by Li Tony RN  Family/Support System Care: involvement promoted     Problem: Communication Impairment (Artificial Airway)  Goal: Effective Communication  Outcome: Improving  Intervention: Ensure Effective Communication  Recent Flowsheet Documentation  Taken 9/14/2021 0807 by Li Tony RN  Communication Enhancement Strategies: family involved in communication plan

## 2021-09-14 NOTE — PROGRESS NOTES
RENAL PROGRESS NOTE    HPI: 70 year old male with past medical history of hypertension, admitted with subarachnoid hemorrhage after a fall on 5/15/2021 s/p emergent left external ventricular drain placement with posterior communicating artery aneurysm defied structured underwent craniotomy with clipping of aneurysm , complicated by persistent cerebral vasospasms and bilateral BAYLEE infarct.  Course also complicated by respiratory failure with intubation pneumonia was treated with cefepime and Vanco, transferred to LTAC on 6/11/2021.  Nephrology consulted for hypercalcemia on 8/15/2021     Hypercalcemia: moderate and stable d/t relative immobility with prolonged hospitalization, and CKD/reduced clearance  Corrected Ca ~11  PTH has remained on the low side on multiple checks, vitamin D level 30 8/15/2021  No history suggestive of any lymphoma or lymphadenopathy or any granulomatosis related disorders to suggest any additional etiology.  PLAN:  Avoid Ca/D suppl  Free water 300cc q4h   Lower Ca TF  Recommend weekly checks unless rising at the moment    HyperK:  Better off ACEi    Volume:  Appears euvolemic despite net +30L by I/Os. I & O  not accurate with condom cath falling off an incontinence. Holding on diuretics, euvolemic. Sodium stable.     CKD 3:  With baseline crea 0.9, 24 hour cr clearance estimates GFR at 45     Hypertension: BP had stable on single agent lisinopril 40 mg, changed to amlodipine 10mg with hyperkalemia.  BP controlled on this     Anemia:  hgb trending 10-11s and stable, likely Inflammatory and malnutrition primary , Mild management per hospitalist medicine,iron stores replete 9/6/21, no obvious bleed      Acute on chronic hypoxemic respiratory failure: trach capped, weaning phase 3 but trach remains d/t copious secretions, keeping a bit on the dry side to promote resp status,   S/p tx ESBL Pseudomonas and Klebsiella pneumonia  Pulmonary following  Currently off  antibiotics     Malnutrition dysphagia after CVA - on tube feeds, adjusted for lower calcium content     SAH w fall sp craniotomy and hematoma evacuation , PCA aneurysm clip  cb BAYLEE stroke ad vasopasm  Now on rehab, speech and OT/PT therapies     Insomnia/mood disorders  On Ritalin and mirtazapine  Management per primary medicine     Metabolic encephalopathy - ongoing           SUBJECTIVE:    Patient more interactive today  More talkative today  Reaching for supplies on bed as nurse is helping with hygiene this morning   Answers questions  Says his breathing is fine   Able to answer yes or no and some short sentences    UOP good, still incontinence with condom cath   Urine clear yellow in bag   Wife not on ipad this AM to update  Hgb and calcium stable  Renal function at baseline  Free water 300 cc q4h  B/p controlled on Norvasc   Trach weaning phase 3  Will see tomorrow     OBJECTIVE:  Physical Exam   Temp: 97.8  F (36.6  C) Temp src: Axillary BP: 138/85 Pulse: 92   Resp: 22 SpO2: 94 % O2 Device: None (Room air)    Vitals:    09/12/21 0500 09/13/21 0631 09/14/21 0600   Weight: 72.1 kg (159 lb) 74.6 kg (164 lb 8 oz) 75 kg (165 lb 6.4 oz)     Vital Signs with Ranges  Temp:  [97.8  F (36.6  C)-98.9  F (37.2  C)] 97.8  F (36.6  C)  Pulse:  [] 92  Resp:  [19-24] 22  BP: (108-141)/(59-85) 138/85  SpO2:  [94 %-97 %] 94 %  I/O last 3 completed shifts:  In: 2611 [NG/GT:2611]  Out: 2100 [Urine:2100]      Patient Vitals for the past 72 hrs:   Weight   09/14/21 0600 75 kg (165 lb 6.4 oz)   09/13/21 0631 74.6 kg (164 lb 8 oz)   09/12/21 0500 72.1 kg (159 lb)       Intake/Output Summary (Last 24 hours) at 8/30/2021 1049  Last data filed at 8/30/2021 0519  Gross per 24 hour   Intake 3693 ml   Output 400 ml   Net 3293 ml       PHYSICAL EXAM:  General - Alert, answers question, sitting up in bed, mitts off hands   Cardiovascular - Regular rate and rhythm , BP controlled   Respiratory -trach capped, on RA, lungscourse, sats  good  On RA  Extremities - No lower extremity edema bilaterally  Neuro:  Exam limited, doesn't follow commands, answers questions, moving exgtremities  GI:  TF infusing  MSK:  Grossly intact, but global debit.   Psych:  flat affect      LABORATORY STUDIES:     Recent Labs   Lab 09/13/21  0704   WBC 9.4   RBC 3.35*   HGB 10.9*   HCT 32.5*          Basic Metabolic Panel:  Recent Labs   Lab 09/14/21  0637 09/13/21  1915 09/13/21  0704 09/10/21  0634 09/09/21  0658 09/08/21  0629   NA  --   --  145 146* 145 144   POTASSIUM 3.6 3.6 3.4* 4.0 4.2 4.0   CHLORIDE  --   --  105 104 104 104   CO2  --   --  29 31 28 28   BUN  --   --  37* 49* 46* 43*   CR  --   --  0.80 0.86 0.86 0.83   GLC  --   --  120 106 117 121   RAGINI  --   --  10.9* 11.1* 11.0* 11.1*       INRNo lab results found in last 7 days.     Recent Labs   Lab Test 09/13/21  0704 09/06/21  0700 05/16/21  0405 05/15/21  2125 05/15/21  1222   INR  --   --   --  1.20* 1.16*   WBC 9.4 9.6   < > 12.3* 16.5*   HGB 10.9* 11.5*   < > 11.8* 13.6    314   < > 239 257    < > = values in this interval not displayed.       Personally reviewed current labs    Elpidio Rowley PA-C  [facility]  [phone]

## 2021-09-14 NOTE — PROGRESS NOTES
"Social Work Note:    Writer called and spoke with spouse about discharge planning.  Spouse aware patient will need a SNF/TCU that can accept a \"new trach\".  Writer informed spouse about the limit options/choices for a SNF/TCU that accept trachs that have been in place less than 6 months.    At this point, spouse only requesting a SNF/TCU as close to her home in [place], as possible.  Informed spouse would try to honor that choice, but it will be challenging.    Will begin with referrals to SNF/TCU.     Dayron Chen, [facility]/[place]  [phone]      "

## 2021-09-14 NOTE — PROGRESS NOTES
RT notified by NA that patient's trach appeared out. Immediately assessed patient, in NAD, SpO2 93%, and did observe that his trach was dislodged. Obturator inserted and trach placed back in stoma without difficulty. Able to pass suction catheter without resistance, audible breath sounds. Able to fit 1 finger between trach ties and neck. RT Lexieko called to verify placement. No adverse reactions observed, left patient capped, on room air, SpO2 95%.

## 2021-09-14 NOTE — PLAN OF CARE
"  Problem: Device-Related Complication Risk (Artificial Airway)  Goal: Optimal Device Function  Outcome: No Change     Problem: Skin and Tissue Injury (Artificial Airway)  Goal: Absence of Device-Related Skin or Tissue Injury  Outcome: No Change     Problem: Communication Impairment (Artificial Airway)  Goal: Effective Communication  Outcome: Improving   RT PROGRESS NOTE     DATA:     CURRENT SETTINGS:             TRACH TYPE / SIZE:  #6 bivona             MODE:   capped continuous on RA                 ACTION:             THERAPIES:   duo neb and mucomyst bid, vest bid             SUCTION:                           FREQUENCY:   x3                        AMOUNT:   scant to large                        CONSISTENCY:   thick                        COLOR:   pale yellow to white             SPONTANEOUS COUGH EFFORT/STRENGTH OF EFFORT (not elicited by suctioning): moderate too strong                              WEANING PHASE:   3                        WEAN MODE:    capped on RA continuous                                                RESPONSE:             BS:   clear and diminished             VITAL SIGNS:   Blood pressure 107/63, pulse 89, temperature 97.9  F (36.6  C), temperature source Axillary, resp. rate 24, height 1.803 m (5' 10.98\"), weight 75 kg (165 lb 6.4 oz), SpO2 99 %.                 EMOTIONAL NEEDS / CONCERNS:  easily redirected              RISK FOR SELF DECANNULATION:  no                                                   NOTE / PLAN:   Continue with aggressive oral cares.  Pt often gets dried mucous in back of his throat because he sleeps with his mouth open.    "

## 2021-09-15 ENCOUNTER — APPOINTMENT (OUTPATIENT)
Dept: OCCUPATIONAL THERAPY | Facility: CLINIC | Age: 71
DRG: 207 | End: 2021-09-15
Attending: HOSPITALIST
Payer: COMMERCIAL

## 2021-09-15 ENCOUNTER — APPOINTMENT (OUTPATIENT)
Dept: PHYSICAL THERAPY | Facility: CLINIC | Age: 71
DRG: 207 | End: 2021-09-15
Attending: HOSPITALIST
Payer: COMMERCIAL

## 2021-09-15 ENCOUNTER — APPOINTMENT (OUTPATIENT)
Dept: SPEECH THERAPY | Facility: CLINIC | Age: 71
DRG: 207 | End: 2021-09-15
Attending: HOSPITALIST
Payer: COMMERCIAL

## 2021-09-15 LAB — POTASSIUM BLD-SCNC: 3.6 MMOL/L (ref 3.5–5)

## 2021-09-15 PROCEDURE — 999N000157 HC STATISTIC RCP TIME EA 10 MIN

## 2021-09-15 PROCEDURE — 250N000013 HC RX MED GY IP 250 OP 250 PS 637: Performed by: INTERNAL MEDICINE

## 2021-09-15 PROCEDURE — 94640 AIRWAY INHALATION TREATMENT: CPT

## 2021-09-15 PROCEDURE — 92526 ORAL FUNCTION THERAPY: CPT | Mod: GN | Performed by: SPEECH-LANGUAGE PATHOLOGIST

## 2021-09-15 PROCEDURE — 250N000013 HC RX MED GY IP 250 OP 250 PS 637: Performed by: HOSPITALIST

## 2021-09-15 PROCEDURE — 99233 SBSQ HOSP IP/OBS HIGH 50: CPT | Performed by: INTERNAL MEDICINE

## 2021-09-15 PROCEDURE — 120N000001 HC R&B MED SURG/OB

## 2021-09-15 PROCEDURE — 999N000009 HC STATISTIC AIRWAY CARE

## 2021-09-15 PROCEDURE — 94640 AIRWAY INHALATION TREATMENT: CPT | Mod: 76

## 2021-09-15 PROCEDURE — 250N000009 HC RX 250: Performed by: INTERNAL MEDICINE

## 2021-09-15 PROCEDURE — 97530 THERAPEUTIC ACTIVITIES: CPT | Mod: GP | Performed by: PHYSICAL THERAPIST

## 2021-09-15 PROCEDURE — 99232 SBSQ HOSP IP/OBS MODERATE 35: CPT | Performed by: PHYSICIAN ASSISTANT

## 2021-09-15 PROCEDURE — 36415 COLL VENOUS BLD VENIPUNCTURE: CPT | Performed by: INTERNAL MEDICINE

## 2021-09-15 PROCEDURE — 250N000011 HC RX IP 250 OP 636

## 2021-09-15 PROCEDURE — 97112 NEUROMUSCULAR REEDUCATION: CPT | Mod: GP | Performed by: PHYSICAL THERAPIST

## 2021-09-15 PROCEDURE — 92507 TX SP LANG VOICE COMM INDIV: CPT | Mod: GN | Performed by: SPEECH-LANGUAGE PATHOLOGIST

## 2021-09-15 PROCEDURE — 250N000013 HC RX MED GY IP 250 OP 250 PS 637

## 2021-09-15 PROCEDURE — 94669 MECHANICAL CHEST WALL OSCILL: CPT

## 2021-09-15 PROCEDURE — 97530 THERAPEUTIC ACTIVITIES: CPT | Mod: GO | Performed by: OCCUPATIONAL THERAPIST

## 2021-09-15 PROCEDURE — 84132 ASSAY OF SERUM POTASSIUM: CPT | Performed by: INTERNAL MEDICINE

## 2021-09-15 PROCEDURE — 250N000013 HC RX MED GY IP 250 OP 250 PS 637: Performed by: NURSE PRACTITIONER

## 2021-09-15 RX ORDER — METHYLPHENIDATE HYDROCHLORIDE 5 MG/1
5 TABLET ORAL DAILY
Status: DISCONTINUED | OUTPATIENT
Start: 2021-09-16 | End: 2021-09-27 | Stop reason: HOSPADM

## 2021-09-15 RX ADMIN — ACETAMINOPHEN ORAL SOLUTION 650 MG: 650 SOLUTION ORAL at 08:37

## 2021-09-15 RX ADMIN — HEPARIN SODIUM 5000 UNITS: 5000 INJECTION, SOLUTION INTRAVENOUS; SUBCUTANEOUS at 14:07

## 2021-09-15 RX ADMIN — ANORECTAL OINTMENT: 15.7; .44; 24; 20.6 OINTMENT TOPICAL at 08:38

## 2021-09-15 RX ADMIN — FAMOTIDINE 20 MG: 20 TABLET, FILM COATED ORAL at 08:37

## 2021-09-15 RX ADMIN — METHYLPHENIDATE HYDROCHLORIDE 10 MG: 5 TABLET ORAL at 08:37

## 2021-09-15 RX ADMIN — IPRATROPIUM BROMIDE AND ALBUTEROL SULFATE 3 ML: 2.5; .5 SOLUTION RESPIRATORY (INHALATION) at 07:16

## 2021-09-15 RX ADMIN — IPRATROPIUM BROMIDE AND ALBUTEROL SULFATE 3 ML: 2.5; .5 SOLUTION RESPIRATORY (INHALATION) at 20:49

## 2021-09-15 RX ADMIN — SCOPALAMINE 1 PATCH: 1 PATCH, EXTENDED RELEASE TRANSDERMAL at 14:09

## 2021-09-15 RX ADMIN — VENLAFAXINE 50 MG: 25 TABLET ORAL at 12:27

## 2021-09-15 RX ADMIN — GENTAMICIN SULFATE 1 DROP: 3 SOLUTION/ DROPS OPHTHALMIC at 14:09

## 2021-09-15 RX ADMIN — HEPARIN SODIUM 5000 UNITS: 5000 INJECTION, SOLUTION INTRAVENOUS; SUBCUTANEOUS at 21:00

## 2021-09-15 RX ADMIN — NYSTATIN 500000 UNITS: 100000 SUSPENSION ORAL at 08:37

## 2021-09-15 RX ADMIN — AMLODIPINE BESYLATE 10 MG: 10 TABLET ORAL at 08:37

## 2021-09-15 RX ADMIN — NYSTATIN 500000 UNITS: 100000 SUSPENSION ORAL at 12:27

## 2021-09-15 RX ADMIN — ACETYLCYSTEINE 2 ML: 200 SOLUTION ORAL; RESPIRATORY (INHALATION) at 07:16

## 2021-09-15 RX ADMIN — HYDROCORTISONE: 1 CREAM TOPICAL at 20:58

## 2021-09-15 RX ADMIN — Medication 2 PACKET: at 20:58

## 2021-09-15 RX ADMIN — HEPARIN SODIUM 5000 UNITS: 5000 INJECTION, SOLUTION INTRAVENOUS; SUBCUTANEOUS at 06:51

## 2021-09-15 RX ADMIN — VENLAFAXINE 50 MG: 25 TABLET ORAL at 08:37

## 2021-09-15 RX ADMIN — ANORECTAL OINTMENT: 15.7; .44; 24; 20.6 OINTMENT TOPICAL at 20:58

## 2021-09-15 RX ADMIN — VENLAFAXINE 50 MG: 25 TABLET ORAL at 16:20

## 2021-09-15 RX ADMIN — GENTAMICIN SULFATE 1 DROP: 3 SOLUTION/ DROPS OPHTHALMIC at 20:58

## 2021-09-15 RX ADMIN — HYDROCORTISONE: 1 CREAM TOPICAL at 08:38

## 2021-09-15 RX ADMIN — FAMOTIDINE 20 MG: 20 TABLET, FILM COATED ORAL at 20:58

## 2021-09-15 RX ADMIN — NYSTATIN 500000 UNITS: 100000 SUSPENSION ORAL at 20:58

## 2021-09-15 RX ADMIN — NYSTATIN 500000 UNITS: 100000 SUSPENSION ORAL at 16:27

## 2021-09-15 RX ADMIN — ACETYLCYSTEINE 2 ML: 200 SOLUTION ORAL; RESPIRATORY (INHALATION) at 20:49

## 2021-09-15 RX ADMIN — VENLAFAXINE 25 MG: 25 TABLET ORAL at 06:51

## 2021-09-15 RX ADMIN — Medication 2 PACKET: at 14:07

## 2021-09-15 RX ADMIN — Medication 2 PACKET: at 08:43

## 2021-09-15 RX ADMIN — GENTAMICIN SULFATE 1 DROP: 3 SOLUTION/ DROPS OPHTHALMIC at 08:38

## 2021-09-15 RX ADMIN — MIRTAZAPINE 7.5 MG: 7.5 TABLET ORAL at 20:58

## 2021-09-15 ASSESSMENT — MIFFLIN-ST. JEOR: SCORE: 1548.92

## 2021-09-15 NOTE — PROGRESS NOTES
Social Work Note:  Patient chart reviewed.  Patient discussed in morning rounds.  Barriers to discharge: chronic trach, chest vest treatment.      Writer has called the SNF/TCU nearest patient/spouse, per her request.  The SNF/TCU in their geographical area can not accept new trachs.  Writer has sent referrals to other SNF/TCU in the [facility] area that can accept new trach patients.    Dayron Chen, [facility]/[place]  [phone]

## 2021-09-15 NOTE — PLAN OF CARE
Problem: Adult Inpatient Plan of Care  Goal: Plan of Care Review  Outcome: No Change       Pt has been alert & talkative with staff. He is confused to his whereabouts, mentions being outside & @ someone's family farm on alejandro shift. Pt reoriented to being in the hospital. Trach remains capped w/o problems. Did have a few episodes of desaturation during the night to 75%-80% when in a deep sleep while mouth breathing. Was placed on 2 liters oxygen via NC by RT with good effect. LS are CTA/diminished. Pt will reach out with (R) hand & grab a hold of any tubes within his sight. Therefore, all lines & tubes must be covered or placed out of pt's reach for safety.

## 2021-09-15 NOTE — PLAN OF CARE
Problem: Communication Impairment (Artificial Airway)  Goal: Effective Communication  7/28/2021 0012 by Hemalatha Kolb, RT  Outcome: Improving  7/28/2021 0009 by Hemalatha Kolb, RT  Outcome: Improving     Problem: Device-Related Complication Risk (Artificial Airway)  Goal: Optimal Device Function  Outcome: Improving     Problem: Skin and Tissue Injury (Artificial Airway)  Goal: Absence of Device-Related Skin or Tissue Injury  Outcome: Improving   RT PROGRESS NOTE     DATA:     CURRENT SETTINGS:             TRACH TYPE / SIZE:  6 Bivona 8/29             MODE:   Cap             FIO2:  2LNC     ACTION:             THERAPIES:   Vest/mucomyst bid/duo-neb BID             SUCTION:                           FREQUENCY: X2                        AMOUNT:   Small                        CONSISTENCY:  Thick                        COLOR:   White              SPONTANEOUS COUGH EFFORT/STRENGTH OF EFFORT (not elicited by suctioning): yes/strong                              WEANING PHASE:   3                        WEAN MODE:   cap                         WEAN TIME:   as tolerated                         END WEAN REASON:  on going      RESPONSE:             BS: Clear/diminished               VITAL SIGNS:   Sat %, HR 85-92, RR 20-22             EMOTIONAL NEEDS / CONCERNS:                  RISK FOR SELF DECANNULATION: yes                        RISK DUE TO:  Confusion                        INTERVENTION/S IN PLACE IS/ARE:  bilateral mint in place     NOTE / PLAN:   Cont with plan. Pt was placed on 2LNC d/t desaturation to the low 80's. Pt also snows and mouth breathe.

## 2021-09-15 NOTE — PROGRESS NOTES
"Bedside Swallow Study     09/15/21 0800   General Information   Onset of Illness/Injury or Date of Surgery 05/15/21   Referring Physician Jamari   Pertinent History of Current Problem Per physician progress note from this AM, \"70-year-old retired man who lived with his wife at home prehospitalization, who originally presented to [facility] on May 15, 2021 for altered mental state he had sustained a fall and was found to be unresponsive by his wife.  On presentation he was found to have dilated right pupil and a systolic blood pressure of 200s. 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 He was treated with milrinone and verapamil.  He was successfully extubated on Pamela 3 and on June 6 he was reintubated due to drop in his saturations and underwent tracheostomy placement as well as feeding tube placement on June 7. June 9: EVD was removed. He was transferred to LTAC on June 11. In the last 1 month he has not been able to be liberated from the ventilator. He has had repeated bouts of Klebsiella infection in sputum and ventilator associated pneumonia. Additionally he has had a large amount of tracheal secretions, significant cognitive impairment, left-sided neglect and ongoing episodes of agitation.\" Patient with lengthy treatment targeting swallowing, compromised by and ultimately discontinued as a result of severe cognitive impairment, preventing his ability to participate. Patient is now tolerating trach capping, improved and more consistent voicing and ability to respond to simple directions and questions.    Past History of Dysphagia Evidence of aspiration of secretions on blue dye tests previously. On most recent blue dye test on 7/28/21 no evidence of " aspiration of secretions were noted, but continued to have increased s/s aspiration with even small amount of moisture added with toothette.   Type of Evaluation   Type of Evaluation Swallow Evaluation   Oral Motor   Oral Musculature anomalies present   Structural Abnormalities none present   Mucosal Quality dry;sticky   Dentition (Oral Motor)   Dentition (Oral Motor) natural dentition   Facial Symmetry (Oral Motor)   Facial Symmetry (Oral Motor) WNL   Lip Function (Oral Motor)   Lip Range of Motion (Oral Motor) retraction impairment   Retraction, Lip Range of Motion bilateral;minimal impairment   Lip Strength (Oral Motor) bilateral;mild impairment   Comment, Lip Function (Oral Motor) Decreased speed   Tongue Function (Oral Motor)   Tongue ROM (Oral Motor) protrusion is impaired;lateralization is impaired   Protrusion, Tongue ROM Impairment (Oral Motor) moderate impairment   Lateralization, Tongue ROM Impairment (Oral Motor) minimal impairment;bilateral   Tongue Strength (Oral Motor) WFL   Comment, Tongue Function (Oral Motor) Decreased speed   Cough/Swallow/Gag Reflex (Oral Motor)   Volitional Swallow (Oral Motor) mildly delayed   Vocal Quality/Secretion Management (Oral Motor)   Vocal Quality (Oral Motor) aphonia  (inconsistent)   Comment, Vocal Quality/Secretion Management (Oral Motor) Patient continues to require suctioning for large amounts per RT   General Swallowing Observations   Current Diet/Method of Nutritional Intake (General Swallowing Observations, NIS) NPO;gastrostomy tube (PEG)   Respiratory Support (General Swallowing Observations)   (Trach capped)   Comment, Secretions/Suctioning Suctioning per RT for large amounts   Swallowing Evaluation Clinical swallow evaluation   Clinical Swallow Evaluation   Feeding Assistance frequent cues/help required   Clinical Swallow Evaluation Textures Trialed thin liquids;mildly thick liquids;pureed   Clinical Swallow Eval: Thin Liquid Texture Trial   Mode of  Presentation, Thin Liquids cup   Volume of Liquid or Food Presented 3 sips   Oral Phase of Swallow other (see comments)  (slow initiation, oral holding vs pharyngeal delay)   Pharyngeal Phase of Swallow coughing/choking;wet vocal quality after swallow;repeated swallows;other (see comments)  (audible, uncoordinated swallows)   Diagnostic Statement Overt s/s aspiration indicating impaired, insufficient swallow function for thin liquids   Clinical Swallow Eval: Mildly Thick Liquids   Mode of Presentation spoon;cup   Volume Presented 3 sips   Oral Phase other (see comments)  (slow initiation: oral holding vs. pharyngeal delay)   Pharyngeal Phase wet vocal quality after swallow;repeated swallows;other (see comments)  (audible, uncoordinated swallow)   Diagnostic Statement Muted s/s aspiration, suggestive of impaired, inadequate swallow function for mildly thick liquids   Clinical Swallow Evaluation: Puree Solid Texture Trial   Mode of Presentation, Puree spoon   Volume of Puree Presented 3 bites   Oral Phase, Puree   (slow initiation: oral holding vs pharyngeal delay)   Pharyngeal Phase, Puree impaired;repeated swallows   Diagnostic Statement Muted s/s aspiration, suggested of impaired swallow function   Esophageal Phase of Swallow   Patient reports or presents with symptoms of esophageal dysphagia No   Swallowing Recommendations   Diet Consistency Recommendations NPO   Instrumental Assessment Recommendations VFSS (videofluroscopic swallowing study)   Comment, Swallowing Recommendations Patient presents with overt and muted s/s aspiration and/or oropharyngeal dysphagia   General Therapy Interventions   Planned Therapy Interventions Dysphagia Treatment   Dysphagia treatment Oropharyngeal exercise training;Modified diet education;Instruction of safe swallow strategies;Compensatory strategies for swallowing   SLP Therapy Assessment/Plan   Criteria for Skilled Therapeutic Interventions Met (SLP Eval) treatment indicated;yes    SLP Diagnosis oropharyngeal dysphagia   Rehab Potential (SLP Eval) good, to achieve stated therapy goals   Therapy Frequency (SLP Eval) 3 times/wk   Predicted Duration of Therapy Intervention (SLP Eval) 4 weeks   Comment, Therapy Assessment/Plan (SLP) Patient presents with overt and muted s/s aspiriation and/or oropharyngeal dysphagia. Given degree of impairment, presence of trach, with accompanied risk for silent aspiration, initiation of diet would be premature before objective evaluation via video swallow study    SLP Discharge Planning    SLP Discharge Recommendation (DC Rec) Transitional Care Facility   SLP Rationale for DC Rec Impaired cognition and difficulty participating in intensive treatment   SLP Brief overview of current status  Continue NPO, but appropriate for further evaluation via video swallow study. Improved voicing and ability to follow simple instructions.     Total Evaluation Time   Total Evaluation Time (Minutes) 20

## 2021-09-15 NOTE — PLAN OF CARE
Problem: Anxiety  Goal: Anxiety Reduction or Resolution  Outcome: Improving     Problem: Discharge Planning  Goal: Discharge Planning (Adult, OB, Behavioral, Peds)  Outcome: Improving   Pt continue trach capped, potasium 3.6 re check tomarrow. Pt had meal observation, see SP note.denies pain.

## 2021-09-15 NOTE — PROGRESS NOTES
[facility]    Medicine Progress Note - Hospitalist Service       Date of Admission:  6/11/2021    Assessment & Plan          Summary:     Dayron Neri is a 70 year male with h/o HTN who presented to ED on 5/15/2021 with acute SAH after a fall with unresponsiveness.  He underwent emergent left external ventricular drain placement.  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  On 5/28/2021, patient had persistent cerebral vasospasm, CT head showed bilateral BAYLEE infarct and was treated with milrinone and verapamil.  On 6/1/2021, angiogram showed no evidence of vasospasm.  Extubated on 6/3/2021.  6/6/2021 he was reintubated due to hypoxia.  On 6/7/2021, patient underwent tracheostomy and PEG tube placement.  EVD removed 6/9/2021.   Patient was treated for possible pneumonia with Unasyn on 5/20/2021, It was switched to ceftriaxone the next day for sputum culture growing Klebsiella. On 5/23, sputum culture also grew Pseudomonas. Antibiotic was switched to Zosyn.  Due to increased WBCs in CSF, possibility of ventriculitis was raised. CSF culture was negative. Antibiotic was switched to cefepime and vancomycin on 5/28/2021 for 2 weeks.  Vanco was discontinued on 6/9/2021.  On 6/5/2021, sputum grew ESBL so cefepime was switched to meropenem. He was transferred to [facility] on 6/11/21.      Repeat sputum culture 6/19 showed MDR pseudomonas and he was started on tobramycin nebs from 6/24/2021 - 7/9/2021 .  7/8/2021 aspirated tube feeds. Spiked a fever that evening 102.8 and WBC went up to 36.9 the next morning so ID reconsulted and was started on ceftazadime-avibactam along with flagyl 7/9/2021 - 7/18/21. He has had repeated bouts of Klebsiella infection in sputum and ventilator associated pneumonia.  Additionally he has had a large amount of tracheal secretions, significant cognitive impairment, left-sided neglect and ongoing episodes of  agitation.  Patient's family has been monitoring the patient continuously during the daytime hours via video monitor and visit with him in person over the weekends.     9/15/21:   Nuvigil stopped last week as it wasn't helping and ritalin restarted 9/12/21 - his wife thinks it has only helped a little so will increase the dose tomorrow.  Respiratory status - Phase 3 Wean - FiO2 21%, was on 2 L overnight last night, Capped continuously.    On Duo-neb four times a day for pulmonary hygiene. Stopped 3% saline nebs 8/27/21 per pulmonary   Still having  Secretions, Suctioning 2-4 x/shift. On scopolamine patch    Barriers to discharge: placement to SNF with trach  care      Assessment & Plan     Acute on chronic hypoxemic respiratory failure:    S/p treatment pseudomonas ESBL and Klebsiella pneumonia. Has excessive secretions. Weaning has been slow. Per pulmonary, his cognition prevents him from improving further. His pulmonary status could improve more if his cognition improves.   - Has been capped on room air since 8/31. Per pulmonary, no plan for decannulation due to excessive secretions.   - Continue to wean per RT and pulmonary.   - Airway secretion: Glycopyrrolate was tried and stopped. Currently on Scopolamine (started 7/20/2021)  - Continue  Duoneb, acetylcysteine, and 3% saline nebs      Acute metabolic encephalopathy/cognitive impairment:  - Likely most of what we are seeing now is chronic due to frontal lobe injury.  Wife is still hopeful he will improve but its unclear if he has the capacity to focus and learn what they are attempting to teach him during therapy.  - Continue effexor, increase ritalin to see if that helps.  - Patient on Effexor for agitation, increased on 9/11/21 to 175 mg total     Ventilator associated pneumonia: Completed antibiotics on July 18.     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 sputum culture 6/19 showed MDR  "pseudomonas. completed Tobramycin nebs 6/24/2021 - 7/9/2021.   - Continue pulmonary toilet, scheduled duonebs.      Traumatic brain injury/intracranial hemorrage: 6/14/21 CT head done for fatigue and read as slight increase in caliber of lateral and third ventricle with possibility of developing communicating hydrocephalus. Discussed with Dr. Casillas (neurosurgeon at [facility]), who did not think there was much change. CT head repeated on 6/16/21 and 9/8/21, which did not show any change.  - Need follow up with Dr. Martinez with Neurosurgery in 3 months with repeat CTA of head and neck for post-op f/u. Next CTA of head and neck mid September      Dysphagia:   -Patient initially failed the blue dye test 7/20/21 with immediate aspiration but did pass the blue dye test a week later but still high risk for aspiration.    - Speech therapy following. See note from 8/17/21. Per speech therapist, despite several weeks of working with patient there has been \"no functional improvement in patient's ability to manage his oropharyngeal secretions.\"  Speech discussed this with wife. Intensive dysphagia treatment has been discontinued and speech therapy is still continuing to follow   - Still having significant secretions, keep NPO with tube feeds due to concerns with aspiration.    Hypercalcemia   - Calcium has been high normal since early July averaging 10.4-10.9. Ca+ 10.6 last check today,  Ionized calcium 1.4-1.45 on 8/11/21, Vitamin D level 30, PTH 26   - Nephrology following   - likely secondary to prolonged hospitalization/immobilization and CKD III based on 24 hour clearance estimating GFR 45  - Tube feeds were adjusted to 800 mg less of calcium per day    Chronic kidney disease (CKD) stage 3a  - creat stable to improved (even though serum creat appears ok, his 24 hour urine shows renal decline and given decreased muscle mass his serum creatinine should be lower than it is if his renal function were normal)).    Hypernatremia  - " resolved    Malnutrition:    - Level of malnutrition: Severe   - Based on: moderate/severe subcutaneous fat loss, moderate/severe muscle loss  - continuous tube feeding. Dietician following      Anemia: hemoglobin stable 10.8. Continue to monitor.      Essential hypertension: BP controlled. On lisinopril     Severe debility, weakness, critical illness, deconditioning, Continue PT/OT     Insomnia:   - Continue nighttime mirtazapine 7.5 mg scheduled.    Left inguinal hernia: US on 6/22/21 shows large left inguinal hernia containing mesenteric fat and bowel, extending to the superior scrotum, no evidence of intratesticular mass. Currently has no acute issue and no intervention indicated.    Slow transit constipation  -Constipated in past.  Continue as needed bowel regimen     Diet: Adult Formula Drip Feeding: Continuous [place] Peptide 1.5; Gastrostomy; Goal Rate: 90 mL/hr; Medication - Feeding Tube Flush Frequency: At least 15-30 mL water before and after medication administration and with tube clogging   DVT Prophylaxis: Heparin SQ   Gutierrez Catheter: Not present   Central Lines: None   Code Status: Full Code         Disposition Plan   Expected discharge: to be determined. Barriers: bilateral hand mitts and frequent trach suctioning    ___________________________________________________________________    Interval History   No new changes, restless night.  Patient denies any pain or problems.     Data reviewed today: I reviewed all medications, new labs and imaging results over the last 24 hours..    Physical Exam   Vital Signs: Temp: 98.8  F (37.1  C) Temp src: Axillary BP: 127/88 Pulse: 80   Resp: 24 SpO2: 97 % O2 Device: None (Room air) Oxygen Delivery: 2 LPM  Weight: 166 lbs 14.4 oz  General Appearance:  Alert, in chair, gesturing with right hand, no apparent distress   Respiratory: coarse anterior breath sounds, no wheezing or crackles, tracheostomy patent with no visible secretions, PMV in place, surrounding  skin around trach covered with gauze, no bleeding around trach site   Cardiovascular: S1,S2, rhythm rate regular, negative murmur   GI: Bowel sounds positive x 4, non distended non tender, G/J tube appears patent with tube feeding infusing, surrounding skin dry/intact  Skin: pink dry and intact  Neurological: alert and oriented x 1, smiles, whispers/mouths words when speaking valve in,  partially understandable, moving bilaterally upper extremities, right greater than left with chronic left sided neglet, does follow a few simple commands but is somewhat inconsistent    Data   Recent Labs   Lab 09/15/21  0645 09/14/21  0637 09/13/21  1915 09/13/21  0704 09/13/21  0704 09/10/21  0634 09/10/21  0634 09/09/21  0658 09/09/21  0658   WBC  --   --   --   --  9.4  --   --   --   --    HGB  --   --   --   --  10.9*  --   --   --   --    MCV  --   --   --   --  97  --   --   --   --    PLT  --   --   --   --  338  --   --   --   --    NA  --   --   --   --  145  --  146*  --  145   POTASSIUM 3.6 3.6 3.6   < > 3.4*   < > 4.0   < > 4.2   CHLORIDE  --   --   --   --  105  --  104  --  104   CO2  --   --   --   --  29  --  31  --  28   BUN  --   --   --   --  37*  --  49*  --  46*   CR  --   --   --   --  0.80  --  0.86  --  0.86   ANIONGAP  --   --   --   --  11  --  11  --  13   RAGINI  --   --   --   --  10.9*  --  11.1*  --  11.0*   GLC  --   --   --   --  120  --  106  --  117   ALBUMIN  --   --   --   --   --   --  3.3*  --  3.2*    < > = values in this interval not displayed.     Medications     dextrose       sodium chloride 25 mL/hr at 09/10/21 0619       acetylcysteine  2 mL Nebulization BID     amLODIPine  10 mg Oral or Feeding Tube Daily     famotidine  20 mg Oral or Feeding Tube BID     fiber modular (NUTRISOURCE FIBER)  2 packet Per G Tube TID     gentamicin  1 drop Both Eyes TID     heparin ANTICOAGULANT  5,000 Units Subcutaneous Q8H     hydrocortisone   Topical BID     ipratropium - albuterol 0.5 mg/2.5 mg/3 mL  3 mL  Nebulization BID     menthol-zinc oxide   Topical TID     methylphenidate  10 mg Oral or Feeding Tube Daily     mirtazapine  7.5 mg Per Feeding Tube At Bedtime     nystatin  500,000 Units Oral 4x Daily     scopolamine  1 patch Transdermal Q72H    And     scopolamine   Transdermal Q8H     sodium chloride (PF)  3 mL Intracatheter Q8H     venlafaxine  25 mg Per Feeding Tube QAM     venlafaxine  50 mg Per Feeding Tube TID w/meals     Restraint:     Patient pulls his IV line and trach.      Within an hour after restraint an in person face to face assessment was completed at 8:00 AM, including an evaluation of the patient's immediate reaction to the intervention, behavioral assessment and review/assessment of history, drugs and medications, recent labs, etc., and behavioral condition.  The patient experienced no adverse physical outcome from seclusion/restraint initiation.  The intervention of restraint or seclusion needs to continue

## 2021-09-15 NOTE — PLAN OF CARE
RT PROGRESS NOTE     DATA:     CURRENT SETTINGS:             TRACH TYPE / SIZE:  #6 [place] (placed 8/29)             MODE:   Capped             FIO2:   RA     ACTION:             THERAPIES:   Duoneb BID, Mucomyst BID, Vest BID             SUCTION:                           FREQUENCY:   x2                        AMOUNT:   Moderate                        CONSISTENCY:   Thick                        COLOR:   White             SPONTANEOUS COUGH EFFORT/STRENGTH OF EFFORT (not elicited by suctioning): Moderate Spontaneous Cough                           WEANING PHASE:   Phase 3                        WEAN MODE:    Capped RA                        WEAN TIME:   Continuous                         END WEAN REASON:   NA     RESPONSE:             BS:   Coarse/Diminished             VITAL SIGNS:   Sating 94-99%, HR 80-83, RR 22-24             EMOTIONAL NEEDS / CONCERNS:  NA                RISK FOR SELF DECANNULATION:  No       NOTE / PLAN:   Pt used 2L NC at night due to desating.  RT will continue to monitor.

## 2021-09-15 NOTE — PROGRESS NOTES
Social Work Note:  Patient chart reviewed.  Patient discussed in morning rounds.  Barriers to discharge: chronic trach, chest vest treatment.       Writer has called the SNF/TCU nearest patient/spouse, per her request.  Writer has placed phone referrals to the 5 SNF/TCU in their geographical area, and none are accept new trachs.  Writer has placed phone/email  referrals to other SNF/TCU in the [facility] area that can accept new trach patients.     Dayron Chen, [facility]  [phone]

## 2021-09-15 NOTE — PROGRESS NOTES
RENAL PROGRESS NOTE    HPI: 70 year old male with past medical history of hypertension, admitted with subarachnoid hemorrhage after a fall on 5/15/2021 s/p emergent left external ventricular drain placement with posterior communicating artery aneurysm defied structured underwent craniotomy with clipping of aneurysm , complicated by persistent cerebral vasospasms and bilateral BAYLEE infarct.  Course also complicated by respiratory failure with intubation pneumonia was treated with cefepime and Vanco, transferred to LTAC on 6/11/2021.  Nephrology consulted for hypercalcemia on 8/15/2021     Hypercalcemia: moderate and stable d/t relative immobility with prolonged hospitalization, and CKD/reduced clearance  Corrected Ca ~11  PTH has remained on the low side on multiple checks, vitamin D level 30 8/15/2021  No history suggestive of any lymphoma or lymphadenopathy or any granulomatosis related disorders to suggest any additional etiology.  PLAN:  Avoid Ca/D suppl  Free water 300cc q4h   Lower Ca TF  Recommend weekly checks unless rising at the moment    HyperK:  Better off ACEi    Volume:  Appears euvolemic despite net +30L by I/Os. I & O  not accurate with condom cath falling off an incontinence. Holding on diuretics, euvolemic. Sodium stable.     CKD 3:  With baseline crea 0.9, 24 hour cr clearance estimates GFR at 45     Hypertension: BP had stable on single agent lisinopril 40 mg, changed to amlodipine 10mg with hyperkalemia.  BP controlled on this     Anemia:  hgb trending 10-11s and stable, likely Inflammatory and malnutrition primary , Mild management per hospitalist medicine,iron stores replete 9/6/21, no obvious bleed      Acute on chronic hypoxemic respiratory failure: trach capped, weaning phase 3 but trach remains d/t copious secretions  S/p tx ESBL Pseudomonas and Klebsiella pneumonia  Pulmonary following  Currently off antibiotics     Malnutrition dysphagia after CVA - on tube feeds, adjusted for  "lower calcium content     SAH w fall sp craniotomy and hematoma evacuation , PCA aneurysm clip  cb BAYLEE stroke ad vasopasm  Now on rehab, speech and OT/PT therapies     Insomnia/mood disorders  On Ritalin and mirtazapine  Management per primary medicine     Metabolic encephalopathy - ongoing           SUBJECTIVE:    Patient interactive today  Answers questions  Wife on ipad  We mentioned he has a birthday coming up and he said \"Don't remind me, I'm getting old, can't you stop time?\"   He was making some jokes  Says he feels ok  Breathing the same   UOP good, still incontinence with condom cath   Urine clear yellow in bag   Hgb and calcium stable  Renal function at baseline  Free water 300 cc q4h  B/p controlled on Norvasc   Trach weaning phase 3  Will see tomorrow     OBJECTIVE:  Physical Exam   Temp: 97.8  F (36.6  C) Temp src: Oral BP: 137/78 Pulse: 81   Resp: 24 SpO2: 96 % O2 Device: None (Room air) Oxygen Delivery: 2 LPM  Vitals:    09/13/21 0631 09/14/21 0600 09/15/21 0600   Weight: 74.6 kg (164 lb 8 oz) 75 kg (165 lb 6.4 oz) 75.7 kg (166 lb 14.4 oz)     Vital Signs with Ranges  Temp:  [97.4  F (36.3  C)-97.9  F (36.6  C)] 97.8  F (36.6  C)  Pulse:  [81-92] 81  Resp:  [20-24] 24  BP: (107-137)/(63-84) 137/78  FiO2 (%):  [21 %] 21 %  SpO2:  [94 %-100 %] 96 %  I/O last 3 completed shifts:  In: 3224 [I.V.:6; NG/GT:3218]  Out: 1575 [Urine:1575]      Patient Vitals for the past 72 hrs:   Weight   09/15/21 0600 75.7 kg (166 lb 14.4 oz)   09/14/21 0600 75 kg (165 lb 6.4 oz)   09/13/21 0631 74.6 kg (164 lb 8 oz)       Intake/Output Summary (Last 24 hours) at 8/30/2021 1049  Last data filed at 8/30/2021 0519  Gross per 24 hour   Intake 3693 ml   Output 400 ml   Net 3293 ml       PHYSICAL EXAM:  General - Alert, answers question, sitting in chair, mitts off hands   Cardiovascular - Regular rate and rhythm , BP controlled   Respiratory -trach capped, on RA, lungscourse, sats good  On RA  Extremities - No lower extremity " edema bilaterally  Neuro:  Exam limited, doesn't follow commands, answers questions, moving exgtremities  GI:  TF infusing  MSK:  Grossly intact, but global debit.   Psych:  flat affect      LABORATORY STUDIES:     Recent Labs   Lab 09/13/21  0704   WBC 9.4   RBC 3.35*   HGB 10.9*   HCT 32.5*          Basic Metabolic Panel:  Recent Labs   Lab 09/15/21  0645 09/14/21  0637 09/13/21  1915 09/13/21  0704 09/10/21  0634 09/09/21  0658   NA  --   --   --  145 146* 145   POTASSIUM 3.6 3.6 3.6 3.4* 4.0 4.2   CHLORIDE  --   --   --  105 104 104   CO2  --   --   --  29 31 28   BUN  --   --   --  37* 49* 46*   CR  --   --   --  0.80 0.86 0.86   GLC  --   --   --  120 106 117   RAGINI  --   --   --  10.9* 11.1* 11.0*       INRNo lab results found in last 7 days.     Recent Labs   Lab Test 09/13/21  0704 09/06/21  0700 05/16/21  0405 05/15/21  2125 05/15/21  1526 05/15/21  1222   INR  --   --   --  1.20*  --  1.16*   WBC 9.4 9.6   < > 12.3*   < > 16.5*   HGB 10.9* 11.5*   < > 11.8*   < > 13.6    314   < > 239   < > 257    < > = values in this interval not displayed.       Personally reviewed current labs    Elpidio Rowley PA-C  [facility]  [phone]

## 2021-09-16 ENCOUNTER — APPOINTMENT (OUTPATIENT)
Dept: OCCUPATIONAL THERAPY | Facility: CLINIC | Age: 71
DRG: 207 | End: 2021-09-16
Attending: HOSPITALIST
Payer: COMMERCIAL

## 2021-09-16 ENCOUNTER — APPOINTMENT (OUTPATIENT)
Dept: SPEECH THERAPY | Facility: CLINIC | Age: 71
DRG: 207 | End: 2021-09-16
Attending: HOSPITALIST
Payer: COMMERCIAL

## 2021-09-16 ENCOUNTER — APPOINTMENT (OUTPATIENT)
Dept: RADIOLOGY | Facility: CLINIC | Age: 71
DRG: 207 | End: 2021-09-16
Attending: INTERNAL MEDICINE
Payer: COMMERCIAL

## 2021-09-16 ENCOUNTER — APPOINTMENT (OUTPATIENT)
Dept: PHYSICAL THERAPY | Facility: CLINIC | Age: 71
DRG: 207 | End: 2021-09-16
Attending: HOSPITALIST
Payer: COMMERCIAL

## 2021-09-16 LAB — POTASSIUM BLD-SCNC: 3.4 MMOL/L (ref 3.5–5)

## 2021-09-16 PROCEDURE — 94669 MECHANICAL CHEST WALL OSCILL: CPT

## 2021-09-16 PROCEDURE — 84132 ASSAY OF SERUM POTASSIUM: CPT | Performed by: INTERNAL MEDICINE

## 2021-09-16 PROCEDURE — 999N000009 HC STATISTIC AIRWAY CARE

## 2021-09-16 PROCEDURE — 97530 THERAPEUTIC ACTIVITIES: CPT | Mod: GO | Performed by: OCCUPATIONAL THERAPIST

## 2021-09-16 PROCEDURE — 250N000013 HC RX MED GY IP 250 OP 250 PS 637: Performed by: NURSE PRACTITIONER

## 2021-09-16 PROCEDURE — 250N000013 HC RX MED GY IP 250 OP 250 PS 637: Performed by: INTERNAL MEDICINE

## 2021-09-16 PROCEDURE — 250N000013 HC RX MED GY IP 250 OP 250 PS 637: Performed by: HOSPITALIST

## 2021-09-16 PROCEDURE — 999N000123 HC STATISTIC OXYGEN O2DAILY TECH TIME

## 2021-09-16 PROCEDURE — 250N000013 HC RX MED GY IP 250 OP 250 PS 637

## 2021-09-16 PROCEDURE — 250N000009 HC RX 250: Performed by: INTERNAL MEDICINE

## 2021-09-16 PROCEDURE — 97112 NEUROMUSCULAR REEDUCATION: CPT | Mod: GP | Performed by: PHYSICAL THERAPIST

## 2021-09-16 PROCEDURE — 99233 SBSQ HOSP IP/OBS HIGH 50: CPT | Performed by: INTERNAL MEDICINE

## 2021-09-16 PROCEDURE — 120N000001 HC R&B MED SURG/OB

## 2021-09-16 PROCEDURE — 97530 THERAPEUTIC ACTIVITIES: CPT | Mod: GP | Performed by: PHYSICAL THERAPIST

## 2021-09-16 PROCEDURE — 94640 AIRWAY INHALATION TREATMENT: CPT | Mod: 76

## 2021-09-16 PROCEDURE — 94640 AIRWAY INHALATION TREATMENT: CPT

## 2021-09-16 PROCEDURE — 250N000011 HC RX IP 250 OP 636

## 2021-09-16 PROCEDURE — 97535 SELF CARE MNGMENT TRAINING: CPT | Mod: GO | Performed by: OCCUPATIONAL THERAPIST

## 2021-09-16 PROCEDURE — 99232 SBSQ HOSP IP/OBS MODERATE 35: CPT | Performed by: NURSE PRACTITIONER

## 2021-09-16 PROCEDURE — 999N000157 HC STATISTIC RCP TIME EA 10 MIN

## 2021-09-16 PROCEDURE — 99232 SBSQ HOSP IP/OBS MODERATE 35: CPT | Performed by: PHYSICIAN ASSISTANT

## 2021-09-16 PROCEDURE — 999N000197 HC STATISTIC WOC PT EDUCATION, 0-15 MIN

## 2021-09-16 PROCEDURE — 36415 COLL VENOUS BLD VENIPUNCTURE: CPT | Performed by: INTERNAL MEDICINE

## 2021-09-16 PROCEDURE — 92611 MOTION FLUOROSCOPY/SWALLOW: CPT | Mod: GN

## 2021-09-16 PROCEDURE — 74230 X-RAY XM SWLNG FUNCJ C+: CPT

## 2021-09-16 RX ORDER — POTASSIUM CHLORIDE 20MEQ/15ML
40 LIQUID (ML) ORAL ONCE
Status: COMPLETED | OUTPATIENT
Start: 2021-09-16 | End: 2021-09-16

## 2021-09-16 RX ORDER — BARIUM SULFATE 400 MG/ML
SUSPENSION ORAL ONCE
Status: COMPLETED | OUTPATIENT
Start: 2021-09-16 | End: 2021-09-16

## 2021-09-16 RX ADMIN — FAMOTIDINE 20 MG: 20 TABLET, FILM COATED ORAL at 21:21

## 2021-09-16 RX ADMIN — ANORECTAL OINTMENT: 15.7; .44; 24; 20.6 OINTMENT TOPICAL at 21:21

## 2021-09-16 RX ADMIN — GENTAMICIN SULFATE 1 DROP: 3 SOLUTION/ DROPS OPHTHALMIC at 21:21

## 2021-09-16 RX ADMIN — METHYLPHENIDATE HYDROCHLORIDE 5 MG: 5 TABLET ORAL at 12:20

## 2021-09-16 RX ADMIN — VENLAFAXINE 25 MG: 25 TABLET ORAL at 05:12

## 2021-09-16 RX ADMIN — HYDROCORTISONE: 1 CREAM TOPICAL at 08:49

## 2021-09-16 RX ADMIN — NYSTATIN 500000 UNITS: 100000 SUSPENSION ORAL at 08:49

## 2021-09-16 RX ADMIN — IPRATROPIUM BROMIDE AND ALBUTEROL SULFATE 3 ML: 2.5; .5 SOLUTION RESPIRATORY (INHALATION) at 07:33

## 2021-09-16 RX ADMIN — FAMOTIDINE 20 MG: 20 TABLET, FILM COATED ORAL at 08:47

## 2021-09-16 RX ADMIN — METHYLPHENIDATE HYDROCHLORIDE 10 MG: 5 TABLET ORAL at 08:47

## 2021-09-16 RX ADMIN — ACETYLCYSTEINE 2 ML: 200 SOLUTION ORAL; RESPIRATORY (INHALATION) at 20:46

## 2021-09-16 RX ADMIN — VENLAFAXINE 50 MG: 25 TABLET ORAL at 12:20

## 2021-09-16 RX ADMIN — HYDROCORTISONE: 1 CREAM TOPICAL at 21:21

## 2021-09-16 RX ADMIN — GENTAMICIN SULFATE 1 DROP: 3 SOLUTION/ DROPS OPHTHALMIC at 13:50

## 2021-09-16 RX ADMIN — AMLODIPINE BESYLATE 10 MG: 10 TABLET ORAL at 08:50

## 2021-09-16 RX ADMIN — NYSTATIN 500000 UNITS: 100000 SUSPENSION ORAL at 17:48

## 2021-09-16 RX ADMIN — VENLAFAXINE 50 MG: 25 TABLET ORAL at 17:48

## 2021-09-16 RX ADMIN — ANORECTAL OINTMENT: 15.7; .44; 24; 20.6 OINTMENT TOPICAL at 13:49

## 2021-09-16 RX ADMIN — NYSTATIN 500000 UNITS: 100000 SUSPENSION ORAL at 21:21

## 2021-09-16 RX ADMIN — GENTAMICIN SULFATE 1 DROP: 3 SOLUTION/ DROPS OPHTHALMIC at 08:48

## 2021-09-16 RX ADMIN — ACETYLCYSTEINE 2 ML: 200 SOLUTION ORAL; RESPIRATORY (INHALATION) at 07:33

## 2021-09-16 RX ADMIN — HEPARIN SODIUM 5000 UNITS: 5000 INJECTION, SOLUTION INTRAVENOUS; SUBCUTANEOUS at 14:00

## 2021-09-16 RX ADMIN — MIRTAZAPINE 7.5 MG: 7.5 TABLET ORAL at 21:29

## 2021-09-16 RX ADMIN — POTASSIUM CHLORIDE 40 MEQ: 20 SOLUTION ORAL at 17:48

## 2021-09-16 RX ADMIN — Medication 2 PACKET: at 08:48

## 2021-09-16 RX ADMIN — IPRATROPIUM BROMIDE AND ALBUTEROL SULFATE 3 ML: 2.5; .5 SOLUTION RESPIRATORY (INHALATION) at 20:48

## 2021-09-16 RX ADMIN — HEPARIN SODIUM 5000 UNITS: 5000 INJECTION, SOLUTION INTRAVENOUS; SUBCUTANEOUS at 05:13

## 2021-09-16 RX ADMIN — ACETAMINOPHEN ORAL SOLUTION 650 MG: 650 SOLUTION ORAL at 21:21

## 2021-09-16 RX ADMIN — Medication 2 PACKET: at 13:50

## 2021-09-16 RX ADMIN — BARIUM SULFATE 10 ML: 400 SUSPENSION ORAL at 14:01

## 2021-09-16 RX ADMIN — HEPARIN SODIUM 5000 UNITS: 5000 INJECTION, SOLUTION INTRAVENOUS; SUBCUTANEOUS at 21:21

## 2021-09-16 RX ADMIN — VENLAFAXINE 50 MG: 25 TABLET ORAL at 08:49

## 2021-09-16 RX ADMIN — Medication 2 PACKET: at 21:21

## 2021-09-16 RX ADMIN — ANORECTAL OINTMENT: 15.7; .44; 24; 20.6 OINTMENT TOPICAL at 08:49

## 2021-09-16 RX ADMIN — NYSTATIN 500000 UNITS: 100000 SUSPENSION ORAL at 12:20

## 2021-09-16 ASSESSMENT — MIFFLIN-ST. JEOR: SCORE: 1565.71

## 2021-09-16 NOTE — PROGRESS NOTES
RENAL PROGRESS NOTE    HPI: 70 year old male with past medical history of hypertension, admitted with subarachnoid hemorrhage after a fall on 5/15/2021 s/p emergent left external ventricular drain placement with posterior communicating artery aneurysm defied structured underwent craniotomy with clipping of aneurysm , complicated by persistent cerebral vasospasms and bilateral BAYLEE infarct.  Course also complicated by respiratory failure with intubation pneumonia was treated with cefepime and Vanco, transferred to LTAC on 6/11/2021.  Nephrology consulted for hypercalcemia on 8/15/2021     Hypercalcemia: moderate and stable d/t relative immobility with prolonged hospitalization, and CKD/reduced clearance  Corrected Ca ~11  PTH has remained on the low side on multiple checks, vitamin D level 30 8/15/2021  No history suggestive of any lymphoma or lymphadenopathy or any granulomatosis related disorders to suggest any additional etiology.  PLAN:  Avoid Ca/D suppl  Free water 300cc q4h   Lower Ca TF  Recommend weekly checks unless rising at the moment    HyperK:  Better off ACEi, now with some lower potassium's. K 3.4 today. Being replaced.     Volume:  Appears euvolemic despite very net positive by I/Os. I & O  not accurate with condom cath falling off an incontinence. Holding on diuretics, euvolemic. Sodium stable.     CKD 3:  With baseline crea 0.9, 24 hour cr clearance estimates GFR at 45     Hypertension: BP had stable on single agent lisinopril 40 mg, changed to amlodipine 10mg with hyperkalemia.  BP controlled on this     Anemia:  hgb trending 10-11s and stable, likely Inflammatory and malnutrition primary , Mild management per hospitalist medicine,iron stores replete 9/6/21, no obvious bleed      Acute on chronic hypoxemic respiratory failure: trach capped, weaning phase 2 but trach remains d/t copious secretions. On scopolamine patch. Requiring suctioning 2-4 times per shift.    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 - on tube feeds, adjusted for lower calcium content     SAH w fall sp craniotomy and hematoma evacuation , PCA aneurysm clip  cb BAYLEE stroke ad vasopasm  Now on rehab, speech and OT/PT therapies     Insomnia/mood disorders  On Ritalin and mirtazapine  Management per primary medicine     Metabolic encephalopathy - ongoing           SUBJECTIVE:    Says he is tired today  He didn't sleep well  Otherwise he feel about the same  UOP good, still occasional incontinence with condom cath   Urine clear yellow in bag   Hgb and calcium stable  Renal function at baseline  Free water 300 cc q4h  B/p controlled on Norvasc   Trach weaning phase 2    OBJECTIVE:  Physical Exam   Temp: 98.3  F (36.8  C) Temp src: Axillary BP: 134/88 Pulse: 87   Resp: 22 SpO2: 96 % O2 Device: None (Room air)    Vitals:    09/14/21 0600 09/15/21 0600 09/16/21 0600   Weight: 75 kg (165 lb 6.4 oz) 75.7 kg (166 lb 14.4 oz) 77.4 kg (170 lb 9.6 oz)     Vital Signs with Ranges  Temp:  [97.9  F (36.6  C)-98.8  F (37.1  C)] 98.3  F (36.8  C)  Pulse:  [77-87] 87  Resp:  [18-24] 22  BP: (127-145)/(73-88) 134/88  FiO2 (%):  [21 %] 21 %  SpO2:  [94 %-99 %] 96 %  I/O last 3 completed shifts:  In: 3162 [NG/GT:3162]  Out: 2820 [Urine:2820]      Patient Vitals for the past 72 hrs:   Weight   09/16/21 0600 77.4 kg (170 lb 9.6 oz)   09/15/21 0600 75.7 kg (166 lb 14.4 oz)   09/14/21 0600 75 kg (165 lb 6.4 oz)       Intake/Output Summary (Last 24 hours) at 8/30/2021 1049  Last data filed at 8/30/2021 0519  Gross per 24 hour   Intake 3693 ml   Output 400 ml   Net 3293 ml       PHYSICAL EXAM:  General - Alert, answers question, sitting in chair, mitts off hands   Cardiovascular - Regular rate and rhythm , BP controlled   Respiratory -trach capped, lungscourse  Extremities - No lower extremity edema bilaterally  Neuro:  Exam limited, doesn't follow commands, answers  questions, moving exgtremities  GI:  TF infusing  MSK:  Grossly intact, but global debit.   Psych:  flat affect      LABORATORY STUDIES:     Recent Labs   Lab 09/13/21  0704   WBC 9.4   RBC 3.35*   HGB 10.9*   HCT 32.5*          Basic Metabolic Panel:  Recent Labs   Lab 09/16/21  0707 09/15/21  0645 09/14/21  0637 09/13/21  1915 09/13/21  0704 09/10/21  0634   NA  --   --   --   --  145 146*   POTASSIUM 3.4* 3.6 3.6 3.6 3.4* 4.0   CHLORIDE  --   --   --   --  105 104   CO2  --   --   --   --  29 31   BUN  --   --   --   --  37* 49*   CR  --   --   --   --  0.80 0.86   GLC  --   --   --   --  120 106   RAGINI  --   --   --   --  10.9* 11.1*       INRNo lab results found in last 7 days.     Recent Labs   Lab Test 09/13/21  0704 09/06/21  0700 05/16/21  0405 05/15/21  2125 05/15/21  1526 05/15/21  1222   INR  --   --   --  1.20*  --  1.16*   WBC 9.4 9.6   < > 12.3*   < > 16.5*   HGB 10.9* 11.5*   < > 11.8*   < > 13.6    314   < > 239   < > 257    < > = values in this interval not displayed.       Personally reviewed current labs    Elpidio Rowley PA-C  [facility]  [phone]

## 2021-09-16 NOTE — PROGRESS NOTES
[facility]    Medicine Progress Note - Hospitalist Service       Date of Admission:  6/11/2021    Assessment & Plan          Summary:     Dayron Neri is a 70 year male with h/o HTN who presented to ED on 5/15/2021 with acute SAH after a fall with unresponsiveness.  He underwent emergent left external ventricular drain placement.  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  On 5/28/2021, patient had persistent cerebral vasospasm, CT head showed bilateral BAYLEE infarct and was treated with milrinone and verapamil.  On 6/1/2021, angiogram showed no evidence of vasospasm.  Extubated on 6/3/2021.  6/6/2021 he was reintubated due to hypoxia.  On 6/7/2021, patient underwent tracheostomy and PEG tube placement.  EVD removed 6/9/2021.   Patient was treated for possible pneumonia with Unasyn on 5/20/2021, It was switched to ceftriaxone the next day for sputum culture growing Klebsiella. On 5/23, sputum culture also grew Pseudomonas. Antibiotic was switched to Zosyn.  Due to increased WBCs in CSF, possibility of ventriculitis was raised. CSF culture was negative. Antibiotic was switched to cefepime and vancomycin on 5/28/2021 for 2 weeks.  Vanco was discontinued on 6/9/2021.  On 6/5/2021, sputum grew ESBL so cefepime was switched to meropenem. He was transferred to [facility] on 6/11/21.      Repeat sputum culture 6/19 showed MDR pseudomonas and he was started on tobramycin nebs from 6/24/2021 - 7/9/2021 .  7/8/2021 aspirated tube feeds. Spiked a fever that evening 102.8 and WBC went up to 36.9 the next morning so ID reconsulted and was started on ceftazadime-avibactam along with flagyl 7/9/2021 - 7/18/21. He has had repeated bouts of Klebsiella infection in sputum and ventilator associated pneumonia.  Additionally he has had a large amount of tracheal secretions, significant cognitive impairment, left-sided neglect and ongoing episodes of  agitation.  Patient's family has been monitoring the patient continuously during the daytime hours via video monitor and visit with him in person over the weekends.     9/16/21:   Nuvigil stopped last week as it wasn't helping and ritalin restarted 9/12/21 - his wife thinks it has only helped a little so Noon dose was added today.  Video swallow today again shows silent aspiration.  Respiratory status - Phase 3 Wean - FiO2 21%, Capped continuously.    On Duo-neb four times a day for pulmonary hygiene. Stopped 3% saline nebs 8/27/21 per pulmonary   Still having  Secretions, Suctioning 1-3 x/shift. On scopolamine patch    Barriers to discharge: placement to SNF with trach  care      Assessment & Plan     Acute on chronic hypoxemic respiratory failure:    S/p treatment pseudomonas ESBL and Klebsiella pneumonia. Has excessive secretions. Weaning has been slow. Per pulmonary, his cognition prevents him from improving further. His pulmonary status could improve more if his cognition improves.   - Has been capped on room air since 8/31. Per pulmonary, no plan for decannulation due to excessive secretions.   - Continue to wean per RT and pulmonary.   - Airway secretion: Glycopyrrolate was tried and stopped. Currently on Scopolamine (started 7/20/2021)  - Continue  Duoneb, acetylcysteine, and 3% saline nebs      Acute metabolic encephalopathy/cognitive impairment:  - Likely most of what we are seeing now is chronic due to frontal lobe injury.  Wife is still hopeful he will improve but its unclear if he has the capacity to focus and learn what they are attempting to teach him during therapy.  - Continue effexor, increase ritalin to see if that helps.  - Patient on Effexor for agitation, increased on 9/11/21 to 175 mg total     Ventilator associated pneumonia: Completed antibiotics on July 18.     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 sputum culture 6/19  "showed MDR pseudomonas. completed Tobramycin nebs 6/24/2021 - 7/9/2021.   - Continue pulmonary toilet, scheduled duonebs.      Traumatic brain injury/intracranial hemorrage: 6/14/21 CT head done for fatigue and read as slight increase in caliber of lateral and third ventricle with possibility of developing communicating hydrocephalus. Discussed with Dr. Casillas (neurosurgeon at [facility]), who did not think there was much change. CT head repeated on 6/16/21 and 9/8/21, which did not show any change.  - Need follow up with Dr. Martinez with Neurosurgery in 3 months with repeat CTA of head and neck for post-op f/u. Next CTA of head and neck mid September      Dysphagia:   -Patient initially failed the blue dye test 7/20/21 with immediate aspiration but did pass the blue dye test a week later but still high risk for aspiration.    - Speech therapy following. See note from 8/17/21. Per speech therapist, despite several weeks of working with patient there has been \"no functional improvement in patient's ability to manage his oropharyngeal secretions.\"  Speech discussed this with wife. Intensive dysphagia treatment has been discontinued and speech therapy is still continuing to follow   - Still having significant secretions, keep NPO with tube feeds due to concerns with aspiration - video swallow 9/16/2021 showing silent aspiration.    Hypercalcemia   - Calcium has been high normal since early July averaging 10.4-10.9. Ca+ 10.9 last check 9/13/21,  Ionized calcium 1.4-1.45 on 8/11/21, Vitamin D level 30, PTH 26   - Nephrology following   - likely secondary to prolonged hospitalization/immobilization and CKD III based on 24 hour clearance estimating GFR 45  - Tube feeds were adjusted to 800 mg less of calcium per day    Chronic kidney disease (CKD) stage 3a  - creat stable to improved (even though serum creat appears ok, his 24 hour urine shows renal decline and given decreased muscle mass his serum creatinine should be lower than " it is if his renal function were normal)).    Hypernatremia  - resolved    Malnutrition:    - Level of malnutrition: Severe   - Based on: moderate/severe subcutaneous fat loss, moderate/severe muscle loss  - continuous tube feeding. Dietician following      Anemia: hemoglobin stable 10.8. Continue to monitor.      Essential hypertension: BP controlled. On lisinopril     Severe debility, weakness, critical illness, deconditioning, Continue PT/OT     Insomnia:   - Continue nighttime mirtazapine 7.5 mg scheduled.    Left inguinal hernia: US on 6/22/21 shows large left inguinal hernia containing mesenteric fat and bowel, extending to the superior scrotum, no evidence of intratesticular mass. Currently has no acute issue and no intervention indicated.    Slow transit constipation  -Constipated in past.  Continue as needed bowel regimen     Diet: Adult Formula Drip Feeding: Continuous [place] Peptide 1.5; Gastrostomy; Goal Rate: 90 mL/hr; Medication - Feeding Tube Flush Frequency: At least 15-30 mL water before and after medication administration and with tube clogging   DVT Prophylaxis: Heparin SQ   Gutierrez Catheter: Not present   Central Lines: None   Code Status: Full Code         Disposition Plan   Expected discharge: to be determined. Barriers: bilateral hand mitts and frequent trach suctioning    ___________________________________________________________________    Interval History   No new changes, restless night.  Patient denies any pain or problems.     Data reviewed today: I reviewed all medications, new labs and imaging results over the last 24 hours..    Physical Exam   Vital Signs: Temp: 98.2  F (36.8  C) Temp src: Axillary BP: 133/76 Pulse: 84   Resp: 22 SpO2: 99 % O2 Device: None (Room air)    Weight: 170 lbs 9.6 oz  General Appearance:  Alert, in chair, gesturing with right hand, no apparent distress   Respiratory: coarse anterior breath sounds, no wheezing or crackles, tracheostomy patent with no  visible secretions, PMV in place, surrounding skin around trach covered with gauze, no bleeding around trach site   Cardiovascular: S1,S2, rhythm rate regular, negative murmur   GI: Bowel sounds positive x 4, non distended non tender, G/J tube appears patent with tube feeding infusing, surrounding skin dry/intact  Skin: pink dry and intact  Neurological: alert and oriented x 1, smiles, whispers/mouths words when speaking valve in,  partially understandable, moving bilaterally upper extremities, right greater than left with chronic left sided neglet, does follow a few simple commands but is somewhat inconsistent    Data   Recent Labs   Lab 09/16/21  0707 09/15/21  0645 09/14/21  0637 09/13/21  1915 09/13/21  0704 09/10/21  0634 09/10/21  0634   WBC  --   --   --   --  9.4  --   --    HGB  --   --   --   --  10.9*  --   --    MCV  --   --   --   --  97  --   --    PLT  --   --   --   --  338  --   --    NA  --   --   --   --  145  --  146*   POTASSIUM 3.4* 3.6 3.6   < > 3.4*   < > 4.0   CHLORIDE  --   --   --   --  105  --  104   CO2  --   --   --   --  29  --  31   BUN  --   --   --   --  37*  --  49*   CR  --   --   --   --  0.80  --  0.86   ANIONGAP  --   --   --   --  11  --  11   RAGINI  --   --   --   --  10.9*  --  11.1*   GLC  --   --   --   --  120  --  106   ALBUMIN  --   --   --   --   --   --  3.3*    < > = values in this interval not displayed.     Medications     dextrose       sodium chloride 25 mL/hr at 09/10/21 0619       acetylcysteine  2 mL Nebulization BID     amLODIPine  10 mg Oral or Feeding Tube Daily     famotidine  20 mg Oral or Feeding Tube BID     fiber modular (NUTRISOURCE FIBER)  2 packet Per G Tube TID     gentamicin  1 drop Both Eyes TID     heparin ANTICOAGULANT  5,000 Units Subcutaneous Q8H     hydrocortisone   Topical BID     ipratropium - albuterol 0.5 mg/2.5 mg/3 mL  3 mL Nebulization BID     menthol-zinc oxide   Topical TID     methylphenidate  10 mg Oral or Feeding Tube Daily      methylphenidate  5 mg Oral or Feeding Tube Daily     mirtazapine  7.5 mg Per Feeding Tube At Bedtime     nystatin  500,000 Units Oral 4x Daily     potassium chloride  40 mEq Oral or Feeding Tube Once     scopolamine  1 patch Transdermal Q72H    And     scopolamine   Transdermal Q8H     venlafaxine  25 mg Per Feeding Tube QAM     venlafaxine  50 mg Per Feeding Tube TID w/meals     Restraint:     Patient pulls his IV line and trach.      Within an hour after restraint an in person face to face assessment was completed at 8:00 AM, including an evaluation of the patient's immediate reaction to the intervention, behavioral assessment and review/assessment of history, drugs and medications, recent labs, etc., and behavioral condition.  The patient experienced no adverse physical outcome from seclusion/restraint initiation.  The intervention of restraint or seclusion needs to continue

## 2021-09-16 NOTE — PROGRESS NOTES
"Pulmonary Medicine Follow up Note :    Clinical status discussed today with  respiratory therapist.    Chief complaint: Ongoing respiratory failure  Significant change in clinical status during past 24 hours? No      FiO2 (%): 21 %  Resp: 22    Tracheal secretions: overnight: x2 small thick white  Strong cough.  Placed on 2Lpm nc overnight for desaturations    Current phase of ventilator weaning pathway:  Phase 3 since 8/31  Capped on room air.       Physical exam     /88 (BP Location: Left arm)   Pulse 87   Temp 98.3  F (36.8  C) (Axillary)   Resp 22   Ht 1.803 m (5' 10.98\")   Wt 77.4 kg (170 lb 9.6 oz)   SpO2 96%   BMI 23.80 kg/m        I examined pt in therapy room  Gen: tracks intermittently,  up in chair  HEENT: AT/NC. Trach in place. Capped up in chair   Lungs: diminished ant otherwise clear  CV: regular, no murmurs or gallops appreciated  Abdomen: soft, NT, BS wnl  Ext: no edema  Neuro: awake, tracking intermittently, smiles. Leans to his Right.          Diagnosis:     Patient is a 69 yo man with history of hypertension. Patient initially presented on 15-May-2021 to [facility] after being found unresponsive by his wife. Patient was then found to have a subarachnoid hemorrhage secondary to a ruptured aneurysm. Patient was intubated and was started on mannitol, IV antihypertensives and hyperventilation and patient was then transferred to [facility]. Patient underwent aneurysm clipping as well as placement of extra-ventricular drain from hydrocephalus on 15-May-2021. Extra-ventricular drain would malfunction and would require replacement on 04-Jun-2021 and 06-Jun-2021. Extra-ventricular drain reportedly was removed on 09-Jun-2021. Patient had intra-arterial vasospasm treatment with verapamil on 28-May-2021.  Patient was extubated on 16-May-2021 but had to be reintubated on 19-May-2021. Patient would be extubated on 03-Jun-2021 but would be reintubated on " 06-Jun-2021. Patient had tracheostomy and PEG-tube placed on 07-Jun-2021. Patient required treatment for hospital-acquired pneumonia. Patient was transferred to LTAC on 11-Jun-2021.    1. Acute respiratory failure s/p trach 6/7/2021  2. S/p treatment pseudomonas ESBL and klebsiella pneumonia   3. Encephalopathy   4. Subarachnoid bleed s/p craniotomy and clipping P-comm rupture aneurysm.   5. HTN  6. Malnourishment   7. Dysphagia s/p G tube    Recommendations/Plans (MDM):  1. Weaning orders: phase 3  Will not decannulate yet, copious amount of secretions per RT  And ongoing aspiration risk.   2. Trach change? 8/29  Due routine changes and cares   3. Diagnostic testing? Per [facility]  4. Continue pulmonary toiletting, scheduled ipratropium-albuterol, acetylcysteine, metaneb.  Stop vest treatment per RT request  Does not seem to make a difference in secretion clearance        Charity Beasley, SISI [facility]

## 2021-09-16 NOTE — PROGRESS NOTES
"   09/16/21 1300   General Information   Onset of Illness/Injury or Date of Surgery 05/15/21   Referring Physician Dr. Jane   Pertinent History of Current Problem Per Dr. Cormier's progress note dated 9/15/21, \"Daryon Neri is a 70 year male with h/o HTN who presented to ED on 5/15/2021 with acute SAH after a fall with unresponsiveness.  He underwent emergent left external ventricular drain placement.  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  On 5/28/2021, patient had persistent cerebral vasospasm, CT head showed bilateral BAYLEE infarct and was treated with milrinone and verapamil.  On 6/1/2021, angiogram showed no evidence of vasospasm.  Extubated on 6/3/2021.  6/6/2021 he was reintubated due to hypoxia.  On 6/7/2021, patient underwent tracheostomy and PEG tube placement.\" Patient transferred to [facility] on 6/11/21.   Type of Evaluation   Type of Evaluation Swallow Evaluation   General Swallowing Observations   Current Diet/Method of Nutritional Intake (General Swallowing Observations, NIS) NPO;gastrostomy tube (PEG)   Respiratory Support (General Swallowing Observations) other (see comments)  (#7 Bivona trach; capped)   Comment, General Swallowing Observations Patient participated in a Bedside Swallow Study on 9/15/21. At that time, he presented with overt and muted signs & symptoms of aspiration and/or oropharyngeal dysphagia. Given degree of impairment, presence of trach, with accompanied risk for silent aspiration, objective evaluation via Video Swallow Study was recommended.   Swallowing Evaluation Videofluoroscopic swallow study (VFSS)   VFSS Evaluation   Radiologist Dr. Cummings   Views Taken left lateral   Physical Location of Procedure [facility] Radiology Dept   VFSS Textures Trialed moderately thick liquids/liquidized;pureed   VFSS Eval: Moderately Thick Liquids   Mode of Presentation spoon;fed by clinician   Order of " Presentation Trials 1 and 2   Preparatory Phase Poor bolus control   Oral Phase Poor AP movement;Premature pharyngeal entry  (Delayed and dyscoordinated bolus transit)   Pharyngeal Phase Delayed swallow reflex;Pharyngeal wall coating;Residue in valleculae;Residue in pyriform sinus  (Delay with pourover past tip of epiglottis; moderate residue)   Rosenbek's Penetration Aspiration Scale 8 - contrast passes glottis, visible subglottic residue remains, absent patient response (aspiration)   Response to Aspiration absent response, silent aspiration   Successful Strategies Trialed During Procedure   (Unable to trial strategies due to limited command following)   Diagnostic Statement Patient presented with deep laryngeal penetration and eventual aspiration of moderately thick liquid. He had no spontaneous cough response at or below the vocal folds (i.e., aspiration was silent). Patient was unable to produce a volitional cough with verbal cues and clinician's model. Moderate pharyngeal stasis was noted in the valleculae and pyriform sinuses after swallows.   VFSS Evaluation: Puree Solid Texture Trial   Mode of Presentation, Puree spoon;fed by clinician   Order of Presentation Trials 3 and 4   Preparatory Phase Poor bolus control   Oral Phase, Puree Poor AP movement;Residue in oral cavity;Premature pharyngeal entry   Pharyngeal Phase, Puree Residue in valleculae   Rosenbek's Penetration Aspiration Scale: Puree Food Trial Results 1 - no aspiration, contrast does not enter airway   Successful Strategies Trialed During Procedure, Puree   (Unable to trial strategies due to limited command following)   Diagnostic Statement Patient presented with a severely prolonged oral phase with puree consistency; particularly with second bolus. Oral transit was delayed, dyscoordinated, and effortful. Moderate vallecular stasis was noted after first bolus of puree. This increased significantly following second bolus. No aspiration or laryngeal  penetration was observed with puree, but patient is at risk for aspiration and/or airway obstruction due to volume of vallecular stasis.   Esophageal Phase of Swallow   Patient reports or presents with symptoms of esophageal dysphagia No   Esophageal sweep performed during today s vidofluoroscopic exam  No   Swallowing Recommendations   Diet Consistency Recommendations NPO   General Therapy Interventions   Planned Therapy Interventions Dysphagia Treatment   Dysphagia treatment Oropharyngeal exercise training;Instruction of safe swallow strategies;Compensatory strategies for swallowing   SLP Therapy Assessment/Plan   Criteria for Skilled Therapeutic Interventions Met (SLP Eval) yes;treatment indicated   SLP Diagnosis Oropharyngeal dysphagia   Rehab Potential (SLP Eval) fair, will monitor progress closely   Therapy Frequency (SLP Eval) 3 times/wk   Predicted Duration of Therapy Intervention (SLP Eval) 4 weeks   Comment, Therapy Assessment/Plan (SLP) Video Swallow Study completed. Patient presents with moderate to severe oropharyngeal dysphagia characterized by delayed and dyscoordinated bolus transit, reduced tongue base retraction, incomplete epiglottic inversion, and reduced pharyngeal constriction. Patient's swallow is both inefficient and unsafe at present. In addition to physiologic barriers to safe swallowing, patient's decreased cognition (particularly his variable attention and reduced awareness) also contribute to his aspiration risk. Continue NPO at this time. Speech Therapy will continue to address swallow by targeting attention, initiation, and awareness.   Therapy Plan Review/Discharge Plan (SLP)   Therapy Plan Review (SLP) evaluation/treatment results reviewed;current/potential barriers reviewed;participants voiced agreement with care plan;participants included;patient;spouse/significant other  (Pt unable to indicate understanding d/t cognitive impairment)   SLP Discharge Planning    SLP Discharge  Recommendation (DC Rec) Transitional Care Facility   SLP Rationale for DC Rec Impaired cognition and difficulty participating in intensive treatment   SLP Brief overview of current status  Continue NPO. Speech Therapy will continue to address swallow by targeting attention, initiation, and awareness. Will consider trials of single ice chips following thorough oral cares.    Total Evaluation Time   Total Evaluation Time (Minutes) 20

## 2021-09-16 NOTE — PLAN OF CARE
RT PROGRESS NOTE     DATA:     CURRENT SETTINGS:             TRACH TYPE / SIZE:  6 Bivona placed 8/29/21             MODE:   Capped/RA     ACTION:             THERAPIES:   BID Duoneb and Mucomyst neb given while receiving BID Vest treatment, x 10 minutes.             SUCTION:                           FREQUENCY:  X 1                        AMOUNT:  mod                         CONSISTENCY:   thick                        COLOR:   white             SPONTANEOUS COUGH EFFORT/STRENGTH OF EFFORT (not elicited by suctioning): SPC                              WEANING PHASE:   3    RESPONSE:             BS:   Dim and clear             VITAL SIGNS:   SATs 97-98%, HR 77-78, RR 20             RISK FOR SELF DECANNULATION: No     NOTE / PLAN:   Continue with same for now.     Problem: Device-Related Complication Risk (Artificial Airway)  Goal: Optimal Device Function  Outcome: No Change     Problem: Skin and Tissue Injury (Artificial Airway)  Goal: Absence of Device-Related Skin or Tissue Injury  Outcome: No Change     Problem: Communication Impairment (Artificial Airway)  Goal: Effective Communication  Outcome: No Change

## 2021-09-16 NOTE — PROGRESS NOTES
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 Will continue to assess weekly.  Glenda Bo, SAUDN, RN, PHN, HNB-BC, CWOCN

## 2021-09-16 NOTE — PROGRESS NOTES
Social Work Note:  Patient chart reviewed.  Patient discussed in morning rounds.  Barriers to discharge: chronic trach, chest vest treatment.       Writer has called the SNF/TCU nearest patient/spouse, per her request.  Writer has placed phone referrals to the 5 SNF/TCU in their geographical area, and none are accept new trachs.  Writer has placed phone/email  referrals to other SNF/TCU in the [facility] area that can accept new trach patients.  Writer called and left message for Whit @ [facility], Jesusita Resendiz @ [facility], and Roman @ [facility].     Dayron Chen, [facility]  [phone]

## 2021-09-16 NOTE — PLAN OF CARE
Problem: Pain Chronic (Persistent)  Goal: Acceptable Pain Control and Functional Ability  Outcome: Improving     Pt denies pain and slept comfortably most of the night.  Vitals were stable.  Will continue to monitor and implement plan of care as ordered.

## 2021-09-16 NOTE — PLAN OF CARE
Problem: Communication Impairment (Artificial Airway)  Goal: Effective Communication  Outcome: Improving     Problem: Device-Related Complication Risk (Artificial Airway)  Goal: Optimal Device Function  Outcome: Improving     Problem: Skin and Tissue Injury (Artificial Airway)  Goal: Absence of Device-Related Skin or Tissue Injury  Outcome: Improving   RT PROGRESS NOTE     DATA:     CURRENT SETTINGS:               TRACH TYPE / SIZE: #6 BIVONA TTS Changed on 8/29/21             MODE: TRACH CAPPED              FIO2:  21%     ACTION:             THERAPIES: DuoNeb/ Mucomyst neb BID                SUCTION:                           FREQUENCY: X 2                        AMOUNT: moderate                         CONSISTENCY: thick                        COLOR:   White /tan             SPONTANEOUS COUGH EFFORT/STRENGTH OF EFFORT (not elicited by suctioning): Fair                               WEANING PHASE: 3                        WEAN MODE: Trach Capped                         WEAN TIME:  CONT.                        END WEAN REASON:      RESPONSE:             BS :Clear               VITAL SIGNS: Sat %, HR 81-84 , RR 20-22             EMOTIONAL NEEDS / CONCERNS:confused               RISK FOR SELF DECANNULATION: no                        RISK DUE TO: no                         INTERVENTION/S IN PLACE IS/ARE:      NOTE / PLAN:  Cont. Current plan of care, Vest Therapy discontinued today

## 2021-09-17 ENCOUNTER — APPOINTMENT (OUTPATIENT)
Dept: PHYSICAL THERAPY | Facility: CLINIC | Age: 71
DRG: 207 | End: 2021-09-17
Attending: HOSPITALIST
Payer: COMMERCIAL

## 2021-09-17 ENCOUNTER — APPOINTMENT (OUTPATIENT)
Dept: SPEECH THERAPY | Facility: CLINIC | Age: 71
DRG: 207 | End: 2021-09-17
Attending: HOSPITALIST
Payer: COMMERCIAL

## 2021-09-17 ENCOUNTER — APPOINTMENT (OUTPATIENT)
Dept: OCCUPATIONAL THERAPY | Facility: CLINIC | Age: 71
DRG: 207 | End: 2021-09-17
Attending: HOSPITALIST
Payer: COMMERCIAL

## 2021-09-17 ENCOUNTER — TELEPHONE (OUTPATIENT)
Dept: NEUROSURGERY | Facility: CLINIC | Age: 71
End: 2021-09-17

## 2021-09-17 LAB
POTASSIUM BLD-SCNC: 3.4 MMOL/L (ref 3.5–5)
POTASSIUM BLD-SCNC: 3.9 MMOL/L (ref 3.5–5)

## 2021-09-17 PROCEDURE — 999N000123 HC STATISTIC OXYGEN O2DAILY TECH TIME

## 2021-09-17 PROCEDURE — 999N000157 HC STATISTIC RCP TIME EA 10 MIN

## 2021-09-17 PROCEDURE — 250N000013 HC RX MED GY IP 250 OP 250 PS 637: Performed by: NURSE PRACTITIONER

## 2021-09-17 PROCEDURE — 97530 THERAPEUTIC ACTIVITIES: CPT | Mod: GO | Performed by: OCCUPATIONAL THERAPIST

## 2021-09-17 PROCEDURE — 36415 COLL VENOUS BLD VENIPUNCTURE: CPT | Performed by: INTERNAL MEDICINE

## 2021-09-17 PROCEDURE — 84132 ASSAY OF SERUM POTASSIUM: CPT | Performed by: INTERNAL MEDICINE

## 2021-09-17 PROCEDURE — 92507 TX SP LANG VOICE COMM INDIV: CPT | Mod: GN | Performed by: SPEECH-LANGUAGE PATHOLOGIST

## 2021-09-17 PROCEDURE — 97112 NEUROMUSCULAR REEDUCATION: CPT | Mod: GP

## 2021-09-17 PROCEDURE — 99232 SBSQ HOSP IP/OBS MODERATE 35: CPT | Performed by: NURSE PRACTITIONER

## 2021-09-17 PROCEDURE — 250N000013 HC RX MED GY IP 250 OP 250 PS 637

## 2021-09-17 PROCEDURE — 250N000009 HC RX 250: Performed by: INTERNAL MEDICINE

## 2021-09-17 PROCEDURE — 250N000011 HC RX IP 250 OP 636

## 2021-09-17 PROCEDURE — 250N000013 HC RX MED GY IP 250 OP 250 PS 637: Performed by: INTERNAL MEDICINE

## 2021-09-17 PROCEDURE — 99233 SBSQ HOSP IP/OBS HIGH 50: CPT | Performed by: INTERNAL MEDICINE

## 2021-09-17 PROCEDURE — 250N000013 HC RX MED GY IP 250 OP 250 PS 637: Performed by: HOSPITALIST

## 2021-09-17 PROCEDURE — 97110 THERAPEUTIC EXERCISES: CPT | Mod: GP

## 2021-09-17 PROCEDURE — 92526 ORAL FUNCTION THERAPY: CPT | Mod: GN | Performed by: SPEECH-LANGUAGE PATHOLOGIST

## 2021-09-17 PROCEDURE — 120N000001 HC R&B MED SURG/OB

## 2021-09-17 PROCEDURE — 999N000009 HC STATISTIC AIRWAY CARE

## 2021-09-17 PROCEDURE — 94640 AIRWAY INHALATION TREATMENT: CPT

## 2021-09-17 PROCEDURE — 94640 AIRWAY INHALATION TREATMENT: CPT | Mod: 76

## 2021-09-17 RX ORDER — MIRTAZAPINE 15 MG/1
15 TABLET, FILM COATED ORAL AT BEDTIME
Status: DISCONTINUED | OUTPATIENT
Start: 2021-09-17 | End: 2021-09-27 | Stop reason: HOSPADM

## 2021-09-17 RX ORDER — POTASSIUM CHLORIDE 1500 MG/1
40 TABLET, EXTENDED RELEASE ORAL ONCE
Status: COMPLETED | OUTPATIENT
Start: 2021-09-17 | End: 2021-09-17

## 2021-09-17 RX ADMIN — HEPARIN SODIUM 5000 UNITS: 5000 INJECTION, SOLUTION INTRAVENOUS; SUBCUTANEOUS at 21:39

## 2021-09-17 RX ADMIN — Medication 2 PACKET: at 21:39

## 2021-09-17 RX ADMIN — HYDROCORTISONE: 1 CREAM TOPICAL at 08:21

## 2021-09-17 RX ADMIN — FAMOTIDINE 20 MG: 20 TABLET, FILM COATED ORAL at 21:39

## 2021-09-17 RX ADMIN — GENTAMICIN SULFATE 1 DROP: 3 SOLUTION/ DROPS OPHTHALMIC at 08:21

## 2021-09-17 RX ADMIN — IPRATROPIUM BROMIDE AND ALBUTEROL SULFATE 3 ML: 2.5; .5 SOLUTION RESPIRATORY (INHALATION) at 21:14

## 2021-09-17 RX ADMIN — MIRTAZAPINE 15 MG: 15 TABLET, FILM COATED ORAL at 21:39

## 2021-09-17 RX ADMIN — METHYLPHENIDATE HYDROCHLORIDE 10 MG: 5 TABLET ORAL at 08:18

## 2021-09-17 RX ADMIN — VENLAFAXINE 50 MG: 25 TABLET ORAL at 12:30

## 2021-09-17 RX ADMIN — METHYLPHENIDATE HYDROCHLORIDE 5 MG: 5 TABLET ORAL at 12:30

## 2021-09-17 RX ADMIN — VENLAFAXINE 25 MG: 25 TABLET ORAL at 06:30

## 2021-09-17 RX ADMIN — FAMOTIDINE 20 MG: 20 TABLET, FILM COATED ORAL at 08:18

## 2021-09-17 RX ADMIN — HEPARIN SODIUM 5000 UNITS: 5000 INJECTION, SOLUTION INTRAVENOUS; SUBCUTANEOUS at 14:20

## 2021-09-17 RX ADMIN — NYSTATIN 500000 UNITS: 100000 SUSPENSION ORAL at 17:07

## 2021-09-17 RX ADMIN — NYSTATIN 500000 UNITS: 100000 SUSPENSION ORAL at 21:39

## 2021-09-17 RX ADMIN — HEPARIN SODIUM 5000 UNITS: 5000 INJECTION, SOLUTION INTRAVENOUS; SUBCUTANEOUS at 06:30

## 2021-09-17 RX ADMIN — VENLAFAXINE 50 MG: 25 TABLET ORAL at 08:19

## 2021-09-17 RX ADMIN — VENLAFAXINE 50 MG: 25 TABLET ORAL at 17:07

## 2021-09-17 RX ADMIN — Medication 2 PACKET: at 08:19

## 2021-09-17 RX ADMIN — NYSTATIN 500000 UNITS: 100000 SUSPENSION ORAL at 08:19

## 2021-09-17 RX ADMIN — IPRATROPIUM BROMIDE AND ALBUTEROL SULFATE 3 ML: 2.5; .5 SOLUTION RESPIRATORY (INHALATION) at 07:40

## 2021-09-17 RX ADMIN — NYSTATIN 500000 UNITS: 100000 SUSPENSION ORAL at 12:30

## 2021-09-17 RX ADMIN — ACETYLCYSTEINE 2 ML: 200 SOLUTION ORAL; RESPIRATORY (INHALATION) at 21:13

## 2021-09-17 RX ADMIN — POTASSIUM CHLORIDE 40 MEQ: 1500 TABLET, EXTENDED RELEASE ORAL at 01:49

## 2021-09-17 RX ADMIN — Medication 2 PACKET: at 14:20

## 2021-09-17 RX ADMIN — ACETYLCYSTEINE 2 ML: 200 SOLUTION ORAL; RESPIRATORY (INHALATION) at 07:40

## 2021-09-17 RX ADMIN — AMLODIPINE BESYLATE 10 MG: 10 TABLET ORAL at 08:19

## 2021-09-17 RX ADMIN — ANORECTAL OINTMENT: 15.7; .44; 24; 20.6 OINTMENT TOPICAL at 14:20

## 2021-09-17 RX ADMIN — ANORECTAL OINTMENT: 15.7; .44; 24; 20.6 OINTMENT TOPICAL at 21:40

## 2021-09-17 RX ADMIN — HYDROCORTISONE: 1 CREAM TOPICAL at 21:40

## 2021-09-17 RX ADMIN — ANORECTAL OINTMENT: 15.7; .44; 24; 20.6 OINTMENT TOPICAL at 08:21

## 2021-09-17 RX ADMIN — ACETAMINOPHEN ORAL SOLUTION 650 MG: 650 SOLUTION ORAL at 08:20

## 2021-09-17 ASSESSMENT — MIFFLIN-ST. JEOR: SCORE: 1550.74

## 2021-09-17 NOTE — PLAN OF CARE
RT PROGRESS NOTE     DATA:     CURRENT SETTINGS:             TRACH TYPE / SIZE:  6 Bivona placed 8/29/21             MODE:   Capped/RA     ACTION:             THERAPIES:   BID Duoneb and Mucomyst neb given.             SUCTION:                           FREQUENCY:  X 2                        AMOUNT:  mod                         CONSISTENCY: thick                          COLOR:   white            SPONTANEOUS COUGH EFFORT/STRENGTH OF EFFORT (not elicited by suctioning): SPC                              WEANING PHASE:   3    RESPONSE:             BS: Clear and dim               VITAL SIGNS:   SATs 97-98%, HR 84, RR 20             RISK FOR SELF DECANNULATION: No     NOTE / PLAN:   Continue with same for now.     Problem: Device-Related Complication Risk (Artificial Airway)  Goal: Optimal Device Function  Outcome: No Change     Problem: Skin and Tissue Injury (Artificial Airway)  Goal: Absence of Device-Related Skin or Tissue Injury  Outcome: No Change     Problem: Communication Impairment (Artificial Airway)  Goal: Effective Communication  Outcome: No Change

## 2021-09-17 NOTE — PROGRESS NOTES
Psychiatric Progress Note  Mild intermittent metabolic encephalopathy fluctuating in the context of prolonged hospitalization, subarachnoid hemorrhage, respiratory failure with hypoxia complex medical condition.  Depression and anxiety.  Cognitive and behavioral changes due to above.  Impulsivity, restlessness and agitation.  Sleep difficulties.     Recommendations:  Efforts at sleep regulation.  Gentle redirection and reassurance.  Ritalin 10 mg in the morning and 5 mg noon.  Venlafaxine 75 mg AM, and 50 mg BID  Mirtazapine 15 mg at bedtime.  Consider Seroquel 12.5 mg 4 times a day as needed for anxiety.      SUBJECTIVE:  Patient was seen with dr Cormier, his wife and daughter(via ipad) in attendance  He was seated in his chair conversing with his family (wife and another member) via ipad. He recognized them and answered some questions.   He was trying to reach out and pick things randomly in air suggestive of hallucinations but he was not bothered by it. Easily redirectable. No paranoia, no agitation.    I spoke with his wife also via ipad. Answered questions rearding medications and whether adding more medication would be beneficial. Given his complex medical hospital journey so far, I predict a slow cognitive and behavioral recovery despite medications. Adding more medication would not be a solution rather can be harmful. It is pertinent that he is sleeping soundly. Ritalin appears to be helping with concentration along with Effexor so continuing those would be good. Increasing or adding too much Ritalin may affect adversely by making his jittery, anxious and sleepless. All questions answered.     MENTAL STATUS EXAMINATION:   Not in acute distress.  Speech is devoid of content.  Thought process is slow.  Recognizes his wife and able to focus on Ipad during the conversation. Thought content shows no psychosis, hallucinations or delusions.  No julita or hypomania.  Judgment, insight, memory, attention, comprehension,  "fund of knowledge are improving.  Language is intact.  Affect is neutral, mood congruent.  No thoughts of dying.  Vital signs in last 24 hours  /83 (BP Location: Left arm)   Pulse 81   Temp 97.8  F (36.6  C) (Axillary)   Resp 20   Ht 1.803 m (5' 10.98\")   Wt 75.9 kg (167 lb 4.8 oz)   SpO2 100%   BMI 23.34 kg/m                "

## 2021-09-17 NOTE — PROGRESS NOTES
Patient not seen today. Chart reviewed. Please call if any concerns.     Elpidio Rowley PA-C  [facility]  [phone]

## 2021-09-17 NOTE — PLAN OF CARE
PProblem: Anxiety  Goal: Anxiety Reduction or Resolution  Outcome: Improving     Problem: Communication Impairment (Artificial Airway)  Goal: Effective Communication  Outcome: Improving     Problem: Pain Chronic (Persistent)  Goal: Acceptable Pain Control and Functional Ability  Outcome: Improving   Pt pulled condom cath x2 , replaced.trach capped potasium need to be collected.

## 2021-09-17 NOTE — PLAN OF CARE
Problem: Anxiety  Goal: Anxiety Reduction or Resolution  Outcome: Improving     Problem: Pain Chronic (Persistent)  Goal: Acceptable Pain Control and Functional Ability  Outcome: Improving   Pt denies pain. Trach capped . Condom cath re placed. Uneventful shift.

## 2021-09-17 NOTE — PLAN OF CARE
Problem: Communication Impairment (Artificial Airway)  Goal: Effective Communication  Outcome: Improving     Problem: Device-Related Complication Risk (Artificial Airway)  Goal: Optimal Device Function  Outcome: Improving     Problem: Skin and Tissue Injury (Artificial Airway)  Goal: Absence of Device-Related Skin or Tissue Injury  Outcome: Improving   RT PROGRESS NOTE     DATA:     CURRENT SETTINGS:               TRACH TYPE / SIZE: #6 BIVONA TTS Changed on 8/29/21             MODE: Trach Capped              FIO2:  21%     ACTION:             THERAPIES: DuoNeb/ Mucomyst neb BID                SUCTION:                           FREQUENCY: X1                        AMOUNT: Scant                         CONSISTENCY: thick                        COLOR:   White              SPONTANEOUS COUGH EFFORT/STRENGTH OF EFFORT (not elicited by suctioning): Fair                               WEANING PHASE: 3                        WEAN MODE: Trach Capped                         WEAN TIME:  CONT.                        END WEAN REASON:      RESPONSE:             BS :Clear               VITAL SIGNS: Sat 96-99%, HR 83-88, RR 20-22             EMOTIONAL NEEDS / CONCERNS:confused               RISK FOR SELF DECANNULATION: no                        RISK DUE TO: no                         INTERVENTION/S IN PLACE IS/ARE:      NOTE / PLAN:  Cont. Current plan of care

## 2021-09-17 NOTE — PLAN OF CARE
Problem: OT General Care Plan  Goal: Cognitive (OT)  Description: Cognitive (OT)  Outcome: Improving     Problem: Communication Impairment (Artificial Airway)  Goal: Effective Communication  Outcome: Improving     Problem: Pain Chronic (Persistent)  Goal: Acceptable Pain Control and Functional Ability  Outcome: Improving   Pt denies pain.had vss, see SP note.potasium protocol 3.4 need one dose replacement.trach capped.

## 2021-09-17 NOTE — TELEPHONE ENCOUNTER
[facility] Call Center    Phone Message    May a detailed message be left on voicemail: yes     Reason for Call: Other: Richard Coreas at [facility], Pt is in hospital and attending [facility] is requeting a video appt with Dr. Martinez.      Please call Lyudmila back at [phone] to schedule.    Action Taken: Message routed to:  [facility] ([facility] Neurosurgery    Travel Screening: Not Applicable

## 2021-09-17 NOTE — PROGRESS NOTES
[facility]    Medicine Progress Note - Hospitalist Service       Date of Admission:  6/11/2021    Assessment & Plan          Summary:     Dayron Neri is a 70 year male with h/o HTN who presented to ED on 5/15/2021 with acute SAH after a fall with unresponsiveness.  He underwent emergent left external ventricular drain placement.  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  On 5/28/2021, patient had persistent cerebral vasospasm, CT head showed bilateral BAYLEE infarct and was treated with milrinone and verapamil.  On 6/1/2021, angiogram showed no evidence of vasospasm.  Extubated on 6/3/2021.  6/6/2021 he was reintubated due to hypoxia.  On 6/7/2021, patient underwent tracheostomy and PEG tube placement.  EVD removed 6/9/2021.   Patient was treated for possible pneumonia with Unasyn on 5/20/2021, It was switched to ceftriaxone the next day for sputum culture growing Klebsiella. On 5/23, sputum culture also grew Pseudomonas. Antibiotic was switched to Zosyn.  Due to increased WBCs in CSF, possibility of ventriculitis was raised. CSF culture was negative. Antibiotic was switched to cefepime and vancomycin on 5/28/2021 for 2 weeks.  Vanco was discontinued on 6/9/2021.  On 6/5/2021, sputum grew ESBL so cefepime was switched to meropenem. He was transferred to [facility] on 6/11/21.      Repeat sputum culture 6/19 showed MDR pseudomonas and he was started on tobramycin nebs from 6/24/2021 - 7/9/2021 .  7/8/2021 aspirated tube feeds. Spiked a fever that evening 102.8 and WBC went up to 36.9 the next morning so ID reconsulted and was started on ceftazadime-avibactam along with flagyl 7/9/2021 - 7/18/21. He has had repeated bouts of Klebsiella infection in sputum and ventilator associated pneumonia.  Additionally he has had a large amount of tracheal secretions, significant cognitive impairment, left-sided neglect and ongoing episodes of  agitation.  Patient's family has been monitoring the patient continuously during the daytime hours via video monitor and visit with him in person over the weekends.     9/17/21:   Nuvigil stopped last week as it wasn't helping and ritalin restarted 9/12/21 - his wife thinks it has only helped a little so Noon dose was added 9/16/21.  Video swallow 9/16/21 again shows silent aspiration.  Respiratory status - Phase 3 Wean - FiO2 21%, Capped continuously.    On Duo-neb four times a day for pulmonary hygiene. Stopped 3% saline nebs 8/27/21 per pulmonary   Still having  Secretions, Suctioning 1-3 x/shift. On scopolamine patch    Barriers to discharge: placement to SNF with trach  care      Assessment & Plan     Acute on chronic hypoxemic respiratory failure:    S/p treatment pseudomonas ESBL and Klebsiella pneumonia. Has excessive secretions. Weaning has been slow. Per pulmonary, his cognition prevents him from improving further. His pulmonary status could improve more if his cognition improves.   - Has been capped on room air since 8/31. Per pulmonary, no plan for decannulation due to excessive secretions.   - Continue to wean per RT and pulmonary.   - Airway secretion: Glycopyrrolate was tried and stopped. Currently on Scopolamine (started 7/20/2021)  - Continue  Duoneb, acetylcysteine, and 3% saline nebs      Acute metabolic encephalopathy/cognitive impairment:  - Likely most of what we are seeing now is chronic due to frontal lobe injury.  Wife is still hopeful he will improve but its unclear if he has the capacity to focus and learn what they are attempting to teach him during therapy.  - Continue effexor, increased ritalin to see if that helps.  - Patient on Effexor for agitation, increased on 9/11/21 to 175 mg total     Ventilator associated pneumonia: Completed antibiotics on July 18.     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 sputum culture 6/19  "showed MDR pseudomonas. completed Tobramycin nebs 6/24/2021 - 7/9/2021.   - Continue pulmonary toilet, scheduled duonebs.      Traumatic brain injury/intracranial hemorrage: 6/14/21 CT head done for fatigue and read as slight increase in caliber of lateral and third ventricle with possibility of developing communicating hydrocephalus. Discussed with Dr. Casillas (neurosurgeon at [facility]), who did not think there was much change. CT head repeated on 6/16/21 and 9/8/21, which did not show any change.  - Need follow up with Dr. Martinez with Neurosurgery in 3 months with repeat CTA of head and neck for post-op f/u. Next CTA of head and neck mid September - placed order to get that arranged.     Dysphagia:   -Patient initially failed the blue dye test 7/20/21 with immediate aspiration but did pass the blue dye test a week later but still high risk for aspiration.    - Speech therapy following. See note from 8/17/21. Per speech therapist, despite several weeks of working with patient there has been \"no functional improvement in patient's ability to manage his oropharyngeal secretions.\"  Speech discussed this with wife. Intensive dysphagia treatment has been discontinued and speech therapy is still continuing to follow   - Still having significant secretions, keep NPO with tube feeds due to concerns with aspiration - video swallow 9/16/2021 showed silent aspiration.    Hypercalcemia   - Calcium has been high normal since early July averaging 10.4-10.9. Ca+ 10.9 last check 9/13/21,  Ionized calcium 1.4-1.45 on 8/11/21, Vitamin D level 30, PTH 26   - Nephrology following   - likely secondary to prolonged hospitalization/immobilization and CKD III based on 24 hour clearance estimating GFR 45  - Tube feeds were adjusted to 800 mg less of calcium per day    Chronic kidney disease (CKD) stage 3a  - creat stable to improved (even though serum creat appears ok, his 24 hour urine shows renal decline and given decreased muscle mass his " serum creatinine should be lower than it is if his renal function were normal)).    Hypernatremia  - resolved    Malnutrition:    - Level of malnutrition: Severe   - Based on: moderate/severe subcutaneous fat loss, moderate/severe muscle loss  - continuous tube feeding. Dietician following      Anemia: hemoglobin stable 10.8. Continue to monitor.      Essential hypertension: BP controlled. On lisinopril     Severe debility, weakness, critical illness, deconditioning, Continue PT/OT     Insomnia:   - Continue nighttime mirtazapine 7.5 mg scheduled.    Left inguinal hernia: US on 6/22/21 shows large left inguinal hernia containing mesenteric fat and bowel, extending to the superior scrotum, no evidence of intratesticular mass. Currently has no acute issue and no intervention indicated.    Slow transit constipation  -Constipated in past.  Continue as needed bowel regimen     Diet: Adult Formula Drip Feeding: Continuous [employer] Peptide 1.5; Gastrostomy; Goal Rate: 90 mL/hr; Medication - Feeding Tube Flush Frequency: At least 15-30 mL water before and after medication administration and with tube clogging   DVT Prophylaxis: Heparin SQ   Gutierrez Catheter: Not present   Central Lines: None   Code Status: Full Code         Disposition Plan   Expected discharge: to be determined. Barriers: bilateral hand mitts and frequent trach suctioning    ___________________________________________________________________    Interval History   No new changes, restless night.  Patient denies any pain or problems.     Data reviewed today: I reviewed all medications, new labs and imaging results over the last 24 hours..    Physical Exam   Vital Signs: Temp: 97.8  F (36.6  C) Temp src: Axillary BP: 132/83 Pulse: 88   Resp: 20 SpO2: 97 % O2 Device: None (Room air)    Weight: 167 lbs 4.8 oz  General Appearance:  Alert, in chair, gesturing with right hand, no apparent distress   Respiratory: coarse anterior breath sounds, no wheezing or  crackles, tracheostomy patent with no visible secretions, PMV in place, surrounding skin around trach covered with gauze, no bleeding around trach site   Cardiovascular: S1,S2, rhythm rate regular, negative murmur   GI: Bowel sounds positive x 4, non distended non tender, G/J tube appears patent with tube feeding infusing, surrounding skin dry/intact  Skin: pink dry and intact  Neurological: alert and oriented x 1, smiles, whispers/mouths words when speaking valve in,  partially understandable, moving bilaterally upper extremities, right greater than left with chronic left sided neglet, does follow a few simple commands but is somewhat inconsistent    Data   Recent Labs   Lab 09/17/21  0615 09/17/21  0003 09/16/21  0707 09/13/21  1915 09/13/21  0704   WBC  --   --   --   --  9.4   HGB  --   --   --   --  10.9*   MCV  --   --   --   --  97   PLT  --   --   --   --  338   NA  --   --   --   --  145   POTASSIUM 3.9 3.4* 3.4*   < > 3.4*   CHLORIDE  --   --   --   --  105   CO2  --   --   --   --  29   BUN  --   --   --   --  37*   CR  --   --   --   --  0.80   ANIONGAP  --   --   --   --  11   RAGINI  --   --   --   --  10.9*   GLC  --   --   --   --  120    < > = values in this interval not displayed.     Medications     dextrose       sodium chloride 25 mL/hr at 09/10/21 0619       acetylcysteine  2 mL Nebulization BID     amLODIPine  10 mg Oral or Feeding Tube Daily     famotidine  20 mg Oral or Feeding Tube BID     fiber modular (NUTRISOURCE FIBER)  2 packet Per G Tube TID     heparin ANTICOAGULANT  5,000 Units Subcutaneous Q8H     hydrocortisone   Topical BID     ipratropium - albuterol 0.5 mg/2.5 mg/3 mL  3 mL Nebulization BID     menthol-zinc oxide   Topical TID     methylphenidate  10 mg Oral or Feeding Tube Daily     methylphenidate  5 mg Oral or Feeding Tube Daily     mirtazapine  15 mg Per Feeding Tube At Bedtime     nystatin  500,000 Units Oral 4x Daily     scopolamine  1 patch Transdermal Q72H    And      scopolamine   Transdermal Q8H     venlafaxine  25 mg Per Feeding Tube QAM     venlafaxine  50 mg Per Feeding Tube TID w/meals     Restraint:     Patient pulls his IV line and trach.      Within an hour after restraint an in person face to face assessment was completed at 8:00 AM, including an evaluation of the patient's immediate reaction to the intervention, behavioral assessment and review/assessment of history, drugs and medications, recent labs, etc., and behavioral condition.  The patient experienced no adverse physical outcome from seclusion/restraint initiation.  The intervention of restraint or seclusion needs to continue

## 2021-09-17 NOTE — PLAN OF CARE
Problem: Adult Inpatient Plan of Care  Goal: Plan of Care Review  Outcome: No Change     Pt's trach has been capped t/o the night. LS CTA/diminished, O2 sats in upper 90's. Exhibits some confusion by mumbling nonsensical comments & not aware of his whereabouts. Talking in a soft voice during the night. Had f/unit(s) Potassium level drawn on nights, results = 3.4, given one bump of Potassium 40 mEq. Potassium this am stable @ 3.9, pt remains on Potassium Protocol. Wears condom catheter to contain urine, which is very effective. Denies pain, slept 90% of the night. Did note pt to reach for balloon to inflate trach this am & redirected. Appears to randomly reach out with hands & grab a hold of objects within his reach. All lines/tubes are kept out of pt's reach or disguised.

## 2021-09-17 NOTE — PROGRESS NOTES
Social Work Note:  Patient chart reviewed.  Patient discussed in morning rounds.  Barriers to discharge: chronic trach, chest vest treatment.  Bed availability/staffing issues at facilities.       Writer has called the SNF/TCU nearest patient/spouse, per her request.  Writer has placed phone referrals to the 5 SNF/TCU in their geographical area, and none are accept new trachs.  Writer has placed phone/email  referrals to other SNF/TCU in the [facility] area that can accept new trach patients.  [facility] unable to accept due to capacity, staffing, and patient's cough assist vest.    Writer called and left message for Whit @ [facility], [facility], and Roman @ [facility].  ADD: Writer left messages today for Whit @ [facility] and Hernandez @ [facility],     Dayron Chen, [facility]  [phone]

## 2021-09-18 LAB — POTASSIUM BLD-SCNC: 3.9 MMOL/L (ref 3.5–5)

## 2021-09-18 PROCEDURE — 250N000011 HC RX IP 250 OP 636

## 2021-09-18 PROCEDURE — 999N000123 HC STATISTIC OXYGEN O2DAILY TECH TIME

## 2021-09-18 PROCEDURE — 120N000001 HC R&B MED SURG/OB

## 2021-09-18 PROCEDURE — 250N000013 HC RX MED GY IP 250 OP 250 PS 637: Performed by: INTERNAL MEDICINE

## 2021-09-18 PROCEDURE — 250N000009 HC RX 250: Performed by: INTERNAL MEDICINE

## 2021-09-18 PROCEDURE — 250N000013 HC RX MED GY IP 250 OP 250 PS 637

## 2021-09-18 PROCEDURE — 94640 AIRWAY INHALATION TREATMENT: CPT

## 2021-09-18 PROCEDURE — 99232 SBSQ HOSP IP/OBS MODERATE 35: CPT | Performed by: INTERNAL MEDICINE

## 2021-09-18 PROCEDURE — 84132 ASSAY OF SERUM POTASSIUM: CPT | Performed by: INTERNAL MEDICINE

## 2021-09-18 PROCEDURE — 250N000013 HC RX MED GY IP 250 OP 250 PS 637: Performed by: NURSE PRACTITIONER

## 2021-09-18 PROCEDURE — 999N000157 HC STATISTIC RCP TIME EA 10 MIN

## 2021-09-18 PROCEDURE — 999N000009 HC STATISTIC AIRWAY CARE

## 2021-09-18 PROCEDURE — 250N000013 HC RX MED GY IP 250 OP 250 PS 637: Performed by: HOSPITALIST

## 2021-09-18 PROCEDURE — 36415 COLL VENOUS BLD VENIPUNCTURE: CPT | Performed by: INTERNAL MEDICINE

## 2021-09-18 PROCEDURE — U0003 INFECTIOUS AGENT DETECTION BY NUCLEIC ACID (DNA OR RNA); SEVERE ACUTE RESPIRATORY SYNDROME CORONAVIRUS 2 (SARS-COV-2) (CORONAVIRUS DISEASE [COVID-19]), AMPLIFIED PROBE TECHNIQUE, MAKING USE OF HIGH THROUGHPUT TECHNOLOGIES AS DESCRIBED BY CMS-2020-01-R: HCPCS | Performed by: INTERNAL MEDICINE

## 2021-09-18 PROCEDURE — 94640 AIRWAY INHALATION TREATMENT: CPT | Mod: 76

## 2021-09-18 PROCEDURE — 99233 SBSQ HOSP IP/OBS HIGH 50: CPT | Performed by: INTERNAL MEDICINE

## 2021-09-18 RX ADMIN — ANORECTAL OINTMENT: 15.7; .44; 24; 20.6 OINTMENT TOPICAL at 20:50

## 2021-09-18 RX ADMIN — NYSTATIN 500000 UNITS: 100000 SUSPENSION ORAL at 20:41

## 2021-09-18 RX ADMIN — VENLAFAXINE 50 MG: 25 TABLET ORAL at 16:43

## 2021-09-18 RX ADMIN — IPRATROPIUM BROMIDE AND ALBUTEROL SULFATE 3 ML: 2.5; .5 SOLUTION RESPIRATORY (INHALATION) at 20:57

## 2021-09-18 RX ADMIN — HYDROCORTISONE: 1 CREAM TOPICAL at 20:52

## 2021-09-18 RX ADMIN — ANORECTAL OINTMENT: 15.7; .44; 24; 20.6 OINTMENT TOPICAL at 09:23

## 2021-09-18 RX ADMIN — METHYLPHENIDATE HYDROCHLORIDE 10 MG: 5 TABLET ORAL at 09:22

## 2021-09-18 RX ADMIN — ACETAMINOPHEN ORAL SOLUTION 650 MG: 650 SOLUTION ORAL at 13:31

## 2021-09-18 RX ADMIN — VENLAFAXINE 25 MG: 25 TABLET ORAL at 06:42

## 2021-09-18 RX ADMIN — HEPARIN SODIUM 5000 UNITS: 5000 INJECTION, SOLUTION INTRAVENOUS; SUBCUTANEOUS at 21:00

## 2021-09-18 RX ADMIN — VENLAFAXINE 50 MG: 25 TABLET ORAL at 13:29

## 2021-09-18 RX ADMIN — VENLAFAXINE 50 MG: 25 TABLET ORAL at 06:42

## 2021-09-18 RX ADMIN — ANORECTAL OINTMENT: 15.7; .44; 24; 20.6 OINTMENT TOPICAL at 13:31

## 2021-09-18 RX ADMIN — NYSTATIN 500000 UNITS: 100000 SUSPENSION ORAL at 16:43

## 2021-09-18 RX ADMIN — NYSTATIN 500000 UNITS: 100000 SUSPENSION ORAL at 09:21

## 2021-09-18 RX ADMIN — ACETYLCYSTEINE 2 ML: 200 SOLUTION ORAL; RESPIRATORY (INHALATION) at 07:36

## 2021-09-18 RX ADMIN — HEPARIN SODIUM 5000 UNITS: 5000 INJECTION, SOLUTION INTRAVENOUS; SUBCUTANEOUS at 06:42

## 2021-09-18 RX ADMIN — AMLODIPINE BESYLATE 10 MG: 10 TABLET ORAL at 09:22

## 2021-09-18 RX ADMIN — SCOPALAMINE 1 PATCH: 1 PATCH, EXTENDED RELEASE TRANSDERMAL at 13:57

## 2021-09-18 RX ADMIN — FAMOTIDINE 20 MG: 20 TABLET, FILM COATED ORAL at 09:22

## 2021-09-18 RX ADMIN — HEPARIN SODIUM 5000 UNITS: 5000 INJECTION, SOLUTION INTRAVENOUS; SUBCUTANEOUS at 13:31

## 2021-09-18 RX ADMIN — HYDROCORTISONE: 1 CREAM TOPICAL at 09:23

## 2021-09-18 RX ADMIN — Medication 2 PACKET: at 13:30

## 2021-09-18 RX ADMIN — METHYLPHENIDATE HYDROCHLORIDE 5 MG: 5 TABLET ORAL at 13:30

## 2021-09-18 RX ADMIN — NYSTATIN 500000 UNITS: 100000 SUSPENSION ORAL at 13:30

## 2021-09-18 RX ADMIN — Medication 2 PACKET: at 09:21

## 2021-09-18 RX ADMIN — IPRATROPIUM BROMIDE AND ALBUTEROL SULFATE 3 ML: 2.5; .5 SOLUTION RESPIRATORY (INHALATION) at 07:36

## 2021-09-18 RX ADMIN — FAMOTIDINE 20 MG: 20 TABLET, FILM COATED ORAL at 20:41

## 2021-09-18 RX ADMIN — Medication 2 PACKET: at 20:42

## 2021-09-18 RX ADMIN — MIRTAZAPINE 15 MG: 15 TABLET, FILM COATED ORAL at 20:41

## 2021-09-18 RX ADMIN — ACETYLCYSTEINE 2 ML: 200 SOLUTION ORAL; RESPIRATORY (INHALATION) at 20:57

## 2021-09-18 ASSESSMENT — MIFFLIN-ST. JEOR: SCORE: 1541.67

## 2021-09-18 NOTE — PLAN OF CARE
Problem: Adult Inpatient Plan of Care  Goal: Absence of Hospital-Acquired Illness or Injury  Outcome: Improving  Intervention: Identify and Manage Fall Risk  Recent Flowsheet Documentation  Taken 9/18/2021 0900 by Maria D Mondragon RN  Safety Promotion/Fall Prevention: activity supervised  Intervention: Prevent Skin Injury  Recent Flowsheet Documentation  Taken 9/18/2021 1300 by Maria D Mondragon RN  Body Position:   right   side-lying  Taken 9/18/2021 0900 by Maria D Mondragon RN  Body Position:   side-lying 30 degrees   right  Intervention: Prevent Infection  Recent Flowsheet Documentation  Taken 9/18/2021 0900 by Maria D Mondragon RN  Infection Prevention: hand hygiene promoted  Goal: Optimal Comfort and Wellbeing  Outcome: Improving  Intervention: Provide Person-Centered Care  Recent Flowsheet Documentation  Taken 9/18/2021 0900 by Maria D Mondragon RN  Trust Relationship/Rapport: care explained   VSS, afebrile.  Pt tolerated sitting in the chair.  Pain controlled with prn tylenol.

## 2021-09-18 NOTE — PLAN OF CARE
RT PROGRESS NOTE     DATA:     CURRENT SETTINGS:             TRACH TYPE / SIZE:  6 Bivona placed 8/29/21             MODE:   Capped/RA     ACTION:             THERAPIES:   BID Duoneb and Mucomyst neb given.             SUCTION:                           FREQUENCY:  X 3                        AMOUNT:  large, mod and copious                         CONSISTENCY: thick                          COLOR:   pale yellow x 2, white x 1            SPONTANEOUS COUGH EFFORT/STRENGTH OF EFFORT (not elicited by suctioning): SPC                              WEANING PHASE:   3    RESPONSE:             BS: Dim and clear               VITAL SIGNS:   SATs 97-98%, HR 82-91, RR 20-22             RISK FOR SELF DECANNULATION: Yes     NOTE / PLAN:   Pt self de-cannulated around 2340 Friday 9/17/21. Pt re-cannulated with no problems. Slight blood. Sxnd for copious, thick pale yellow post. BS: Dim and clear, SATs 95% on RA, HR 91, RR 22. Safety event filled out. Continue with same for now. Pt seems to rest better when his head is propped up with pillows and gel pad and not allowed to hyper-extend. Note left for staff to be aware of this.    Problem: Device-Related Complication Risk (Artificial Airway)  Goal: Optimal Device Function  Outcome: No Change     Problem: Skin and Tissue Injury (Artificial Airway)  Goal: Absence of Device-Related Skin or Tissue Injury  Outcome: No Change     Problem: Communication Impairment (Artificial Airway)  Goal: Effective Communication  Outcome: No Change

## 2021-09-18 NOTE — PLAN OF CARE
Problem: Mechanical Ventilation  Goal: Patient will maintain patent airway  Outcome: Progressing  Goal: Tracheostomy will be managed safely  Outcome: Progressing    RT PROGRESS NOTE     DATA:     CURRENT SETTINGS:               TRACH TYPE / SIZE: #6 Bivona, placed 8/29/21             MODE:  capped   FIO2:    RA     ACTION:             THERAPIES: Duo/ Mucomyst neb BID given on scheduled time             SUCTION:                           FREQUENCY: 2                        AMOUNT: large/ small                        CONSISTENCY: thick                        COLOR:   white/ pale yellow             SPONTANEOUS COUGH EFFORT/STRENGTH OF EFFORT (not elicited by suctioning):  yes/ strong, swallow part of secretions, using [employer]auer for small pale yellow secretions.                          WEANING PHASE:   3                        WEAN MODE: capped/ RA                        WEAN TIME: cont as tolerated                        END WEAN REASON:      RESPONSE:             BS:  clear decreased - clear decreased on R side, clear on L side after Sx              VITAL SIGNS: O2 sat 95-97%, HR 82-97, RR 20             EMOTIONAL NEEDS / CONCERNS:  None               RISK FOR SELF DECANNULATION:  Yes                        RISK DUE TO: Confusion                        INTERVENTION/S IN PLACE IS/ARE: nONE       NOTE / PLAN:  cont current plan of care - cap trach as tolerated.

## 2021-09-18 NOTE — PLAN OF CARE
Problem: Anxiety  Goal: Anxiety Reduction or Resolution  Outcome: Improving     Problem: Adult Inpatient Plan of Care  Goal: Absence of Hospital-Acquired Illness or Injury  Intervention: Identify and Manage Fall Risk  Recent Flowsheet Documentation  Taken 9/18/2021 0311 by Margot Sargent RN  Safety Promotion/Fall Prevention:   activity supervised   bed alarm on   room door open   room near nurse's station   safety round/check completed  Intervention: Prevent Infection  Recent Flowsheet Documentation  Taken 9/18/2021 0311 by Margot Sargent RN  Infection Prevention:   hand hygiene promoted   rest/sleep promoted   Vital signs were stable. Pt. was repositioned every 2 hours. Pt. slept most of the shift. Pt. Denied any pain and was comfortable for the whole shift. Continue to monitor.  Margot Sargent RN

## 2021-09-18 NOTE — SIGNIFICANT EVENT
RT NOTE: Around 2340, staff RN responded to oximeter alarm. Pt was found with trach tube out. PT placed on NC to keep SATs up, but pt appeared fine. Pt re-cannulated with same trach, there was a small amount of blood. Pt sxnd for copious thick pale yellow. BS: Dim and clear, SATS on RA/Capped: 95%, HR 91, RR 22. RN aware. Safety event filled out by RT.    Anita Wallace, RT

## 2021-09-18 NOTE — PLAN OF CARE
Problem: Adult Inpatient Plan of Care  Goal: Patient-Specific Goal (Individualized)  Outcome: Improving     Problem: Adult Inpatient Plan of Care  Goal: Absence of Hospital-Acquired Illness or Injury  Outcome: Improving     Problem: Adult Inpatient Plan of Care  Goal: Optimal Comfort and Wellbeing  Outcome: Improving   Patient was calm, cooperative with all cares. No complain of pain.

## 2021-09-18 NOTE — PROGRESS NOTES
[facility]    Medicine Progress Note - Hospitalist Service       Date of Admission:  6/11/2021    Assessment & Plan          Summary:     Dayron Neri is a 70 year male with h/o HTN who presented to ED on 5/15/2021 with acute SAH after a fall with unresponsiveness.  He underwent emergent left external ventricular drain placement.  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  On 5/28/2021, patient had persistent cerebral vasospasm, CT head showed bilateral BAYLEE infarct and was treated with milrinone and verapamil.  On 6/1/2021, angiogram showed no evidence of vasospasm.  Extubated on 6/3/2021.  6/6/2021 he was reintubated due to hypoxia.  On 6/7/2021, patient underwent tracheostomy and PEG tube placement.  EVD removed 6/9/2021.   Patient was treated for possible pneumonia with Unasyn on 5/20/2021, It was switched to ceftriaxone the next day for sputum culture growing Klebsiella. On 5/23, sputum culture also grew Pseudomonas. Antibiotic was switched to Zosyn.  Due to increased WBCs in CSF, possibility of ventriculitis was raised. CSF culture was negative. Antibiotic was switched to cefepime and vancomycin on 5/28/2021 for 2 weeks.  Vanco was discontinued on 6/9/2021.  On 6/5/2021, sputum grew ESBL so cefepime was switched to meropenem. He was transferred to [facility] on 6/11/21.      Repeat sputum culture 6/19 showed MDR pseudomonas and he was started on tobramycin nebs from 6/24/2021 - 7/9/2021 .  7/8/2021 aspirated tube feeds. Spiked a fever that evening 102.8 and WBC went up to 36.9 the next morning so ID reconsulted and was started on ceftazadime-avibactam along with flagyl 7/9/2021 - 7/18/21. He has had repeated bouts of Klebsiella infection in sputum and ventilator associated pneumonia.  Additionally he has had a large amount of tracheal secretions, significant cognitive impairment, left-sided neglect and ongoing episodes of  agitation.  Patient's family has been monitoring the patient continuously during the daytime hours via video monitor and visit with him in person over the weekends.     9/18/21:   Nuvigil stopped last week as it wasn't helping and ritalin restarted 9/12/21 - his wife thinks it has only helped a little so Noon dose was added 9/16/21.  Video swallow 9/16/21 again shows silent aspiration.  Respiratory status - Phase 3 Wean - FiO2 21%, Capped continuously.    On Duo-neb four times a day for pulmonary hygiene. Stopped 3% saline nebs 8/27/21 per pulmonary   Still having  Secretions, Suctioning 1-3 x/shift. On scopolamine patch    Barriers to discharge: placement to SNF with trach care      Assessment & Plan     Acute on chronic hypoxemic respiratory failure:    S/p treatment pseudomonas ESBL and Klebsiella pneumonia. Has excessive secretions. Weaning has been slow. Per pulmonary, his cognition prevents him from improving further. His pulmonary status could improve more if his cognition improves.   - Has been capped on room air since 8/31. Per pulmonary, no plan for decannulation due to excessive secretions.   - Continue to wean per RT and pulmonary.   - Airway secretion: Glycopyrrolate was tried and stopped. Currently on Scopolamine (started 7/20/2021)  - Continue  Duoneb, acetylcysteine      Acute metabolic encephalopathy/cognitive impairment:  - Likely most of what we are seeing now is chronic due to frontal lobe injury.  Wife is still hopeful he will improve but its unclear if he has the capacity to focus and learn what they are attempting to teach him during therapy.  - Continue effexor, increased ritalin to see if that helps.  - Patient on Effexor for agitation, increased on 9/11/21 to 175 mg total     Ventilator associated pneumonia: Completed antibiotics on July 18.     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 sputum culture 6/19 showed MDR  "pseudomonas. completed Tobramycin nebs 6/24/2021 - 7/9/2021.   - Continue pulmonary toilet, scheduled duonebs.      Traumatic brain injury/intracranial hemorrage: 6/14/21 CT head done for fatigue and read as slight increase in caliber of lateral and third ventricle with possibility of developing communicating hydrocephalus. [facility] who did not think there was much change. CT head repeated on 6/16/21 and 9/8/21, which did not show any change.  - Need follow up with Dr. Martinez with Neurosurgery in 3 months with repeat CTA of head and neck for post-op f/u. Next CTA of head and neck mid September - placed order to get that arranged.     Dysphagia:   -Patient initially failed the blue dye test 7/20/21 with immediate aspiration but did pass the blue dye test a week later but still high risk for aspiration.    - Speech therapy following. See note from 8/17/21. Per speech therapist, despite several weeks of working with patient there has been \"no functional improvement in patient's ability to manage his oropharyngeal secretions.\"  Speech discussed this with wife. Intensive dysphagia treatment has been discontinued and speech therapy is still continuing to follow   - Still having significant secretions, keep NPO with tube feeds due to concerns with aspiration - video swallow 9/16/2021 showed silent aspiration.    Hypercalcemia   - Calcium has been high normal since early July averaging 10.4-10.9. Ca+ 10.9 last check 9/13/21,  Ionized calcium 1.4-1.45 on 8/11/21, Vitamin D level 30, PTH 26   - Nephrology following   - likely secondary to prolonged hospitalization/immobilization and CKD III based on 24 hour clearance estimating GFR 45  - Tube feeds were adjusted to 800 mg less of calcium per day    Chronic kidney disease (CKD) stage 3a  - creat stable to improved (even though serum creat appears ok, his 24 hour urine shows renal decline and given decreased muscle mass his serum " creatinine should be lower than it is if his renal function were normal)).    Hypernatremia  - resolved    Malnutrition:    - Level of malnutrition: Severe   - Based on: moderate/severe subcutaneous fat loss, moderate/severe muscle loss  - continuous tube feeding. Dietician following      Anemia: hemoglobin stable 10.8. Continue to monitor.      Essential hypertension: BP controlled. On lisinopril     Severe debility, weakness, critical illness, deconditioning, Continue PT/OT     Insomnia:   - Continue nighttime mirtazapine 7.5 mg scheduled.    Left inguinal hernia: US on 6/22/21 shows large left inguinal hernia containing mesenteric fat and bowel, extending to the superior scrotum, no evidence of intratesticular mass. Currently has no acute issue and no intervention indicated.    Slow transit constipation  -Constipated in past.  Continue as needed bowel regimen     Diet: Adult Formula Drip Feeding: Continuous [place] Peptide 1.5; Gastrostomy; Goal Rate: 90 mL/hr; Medication - Feeding Tube Flush Frequency: At least 15-30 mL water before and after medication administration and with tube clogging   DVT Prophylaxis: Heparin SQ   Gutierrez Catheter: Not present   Central Lines: None   Code Status: Full Code         Disposition Plan   Expected discharge: to be determined. Barriers: bilateral hand mitts and frequent trach suctioning    ___________________________________________________________________    Interval History   No new changes, restless night.  Patient denies any pain or problems.     Data reviewed today: I reviewed all medications, new labs and imaging results over the last 24 hours..    Physical Exam   Vital Signs: Temp: 98.1  F (36.7  C) Temp src: Axillary BP: 130/79 Pulse: 81   Resp: 20 SpO2: 98 % O2 Device: None (Room air)    Weight: 165 lbs 4.8 oz  General Appearance:  Alert, in chair, gesturing with right hand, no apparent distress   Respiratory: coarse anterior breath sounds, no wheezing or crackles,  tracheostomy patent with no visible secretions, PMV in place, surrounding skin around trach covered with gauze, no bleeding around trach site   Cardiovascular: S1,S2, rhythm rate regular, negative murmur   GI: Bowel sounds positive x 4, non distended non tender, G/J tube appears patent with tube feeding infusing, surrounding skin dry/intact  Skin: pink dry and intact  Neurological: alert and oriented x 1, smiles, whispers/mouths words when speaking valve in,  partially understandable, moving bilaterally upper extremities, right greater than left with chronic left sided neglet, does follow a few simple commands but is somewhat inconsistent    Data   Recent Labs   Lab 09/18/21  0711 09/17/21  0615 09/17/21  0003 09/13/21  1915 09/13/21  0704   WBC  --   --   --   --  9.4   HGB  --   --   --   --  10.9*   MCV  --   --   --   --  97   PLT  --   --   --   --  338   NA  --   --   --   --  145   POTASSIUM 3.9 3.9 3.4*   < > 3.4*   CHLORIDE  --   --   --   --  105   CO2  --   --   --   --  29   BUN  --   --   --   --  37*   CR  --   --   --   --  0.80   ANIONGAP  --   --   --   --  11   RAGINI  --   --   --   --  10.9*   GLC  --   --   --   --  120    < > = values in this interval not displayed.     Medications     dextrose       sodium chloride 25 mL/hr at 09/10/21 0619       acetylcysteine  2 mL Nebulization BID     amLODIPine  10 mg Oral or Feeding Tube Daily     famotidine  20 mg Oral or Feeding Tube BID     fiber modular (NUTRISOURCE FIBER)  2 packet Per G Tube TID     heparin ANTICOAGULANT  5,000 Units Subcutaneous Q8H     hydrocortisone   Topical BID     ipratropium - albuterol 0.5 mg/2.5 mg/3 mL  3 mL Nebulization BID     menthol-zinc oxide   Topical TID     methylphenidate  10 mg Oral or Feeding Tube Daily     methylphenidate  5 mg Oral or Feeding Tube Daily     mirtazapine  15 mg Per Feeding Tube At Bedtime     nystatin  500,000 Units Oral 4x Daily     scopolamine  1 patch Transdermal Q72H    And     scopolamine    Transdermal Q8H     venlafaxine  25 mg Per Feeding Tube QAM     venlafaxine  50 mg Per Feeding Tube TID w/meals     Restraint:     Patient pulls his IV line and trach.      Within an hour after restraint an in person face to face assessment was completed at 8:00 AM, including an evaluation of the patient's immediate reaction to the intervention, behavioral assessment and review/assessment of history, drugs and medications, recent labs, etc., and behavioral condition.  The patient experienced no adverse physical outcome from seclusion/restraint initiation.  The intervention of restraint or seclusion needs to continue

## 2021-09-18 NOTE — PROGRESS NOTES
RENAL PROGRESS NOTE    HPI: 70 year old male with past medical history of hypertension, admitted with subarachnoid hemorrhage after a fall on 5/15/2021 s/p emergent left external ventricular drain placement with posterior communicating artery aneurysm defied structured underwent craniotomy with clipping of aneurysm , complicated by persistent cerebral vasospasms and bilateral BAYLEE infarct.  Course also complicated by respiratory failure with intubation pneumonia was treated with cefepime and Vanco, transferred to LTAC on 6/11/2021.  Nephrology consulted for hypercalcemia on 8/15/2021     Hypercalcemia: moderate and stable d/t relative immobility with prolonged hospitalization, and CKD/reduced clearance  Corrected Ca ~11  PTH has remained on the low side on multiple checks, vitamin D level 30 8/15/2021  No history suggestive of any lymphoma or lymphadenopathy or any granulomatosis related disorders to suggest any additional etiology.  PLAN:  Avoid Ca/D suppl  Free water 300cc q4h   Lower Ca TF  Recommend weekly checks unless rising at the moment  -- can consider pamidronate lower dose at 30mg (GFR~45) [id number] if stays high    HypoK:  Better off ACEi, now with some lower potassium's. Being replaced.     Volume:  Appears euvolemic despite very net positive by I/Os. I & O  not accurate with condom cath falling off an incontinence. Holding on diuretics, euvolemic. Sodium stable.     CKD 3:  With baseline crea 0.9, 24 hour cr clearance estimates GFR at 45     Hypertension:   BP had stable on single agent lisinopril 40 mg, changed to amlodipine 10mg with hyperkalemia.  BP controlled on this  -- no issues with high K with held ACEi , mild hypoK now , can keep on replacement     Anemia:  hgb trending 10-11s and stable, likely Inflammatory and malnutrition primary , Mild management per hospitalist medicine,iron stores replete 9/6/21, no obvious bleed      Acute on chronic hypoxemic respiratory failure: trach capped,  weaning phase 2 but trach remains d/t copious secretions. On scopolamine patch. Requiring suctioning 2-4 times per shift.    S/p tx ESBL Pseudomonas and Klebsiella pneumonia  Pulmonary following  Currently off antibiotics     Malnutrition dysphagia after CVA - on tube feeds, adjusted for lower calcium content     SAH w fall sp craniotomy and hematoma evacuation , PCA aneurysm clip  cb BAYLEE stroke ad vasopasm  Now on rehab, speech and OT/PT therapies     Insomnia/mood disorders  On Ritalin and mirtazapine  Management per primary medicine     Metabolic encephalopathy - ongoing       Thank ou for the consultation  We will follow periodically , please call with concerns    Deepthi Pradhan MD  [facility]  [phone]      SUBJECTIVE:    Says he is fine  Appears mentation and breathing better then 1 month back when I last saw him   Urine clear yellow in bag   Hgb and calcium stable  Renal function at baseline  Free water 300 cc q4h  B/p controlled on Norvasc   Trach weaning  Still with silent aspirations    OBJECTIVE:  Physical Exam   Temp: 98.1  F (36.7  C) Temp src: Axillary BP: 130/79 Pulse: 81   Resp: 20 SpO2: 98 % O2 Device: None (Room air)    Vitals:    09/16/21 0600 09/17/21 0643 09/18/21 0600   Weight: 77.4 kg (170 lb 9.6 oz) 75.9 kg (167 lb 4.8 oz) 75 kg (165 lb 4.8 oz)     Vital Signs with Ranges  Temp:  [97.6  F (36.4  C)-98.1  F (36.7  C)] 98.1  F (36.7  C)  Pulse:  [80-91] 81  Resp:  [20] 20  BP: (129-137)/(74-79) 130/79  SpO2:  [94 %-100 %] 98 %  I/O last 3 completed shifts:  In: 3356 [NG/GT:3356]  Out: 1775 [Urine:1775]      Patient Vitals for the past 72 hrs:   Weight   09/18/21 0600 75 kg (165 lb 4.8 oz)   09/17/21 0643 75.9 kg (167 lb 4.8 oz)   09/16/21 0600 77.4 kg (170 lb 9.6 oz)       Intake/Output Summary (Last 24 hours) at 8/30/2021 1049  Last data filed at 8/30/2021 0519  Gross per 24 hour   Intake 3693 ml   Output 400 ml   Net 3293 ml       PHYSICAL EXAM:  General - Alert,  answers question, sitting in chair, mitts off hands   Cardiovascular - Regular rate and rhythm , BP controlled   Respiratory -trach capped, lungscourse  Extremities - No lower extremity edema bilaterally  Neuro:  Exam limited, doesn't follow commands, answers questions, moving exgtremities  GI:  TF infusing  MSK:  Grossly intact, but global debit.   Psych:  flat affect      LABORATORY STUDIES:     Recent Labs   Lab 09/13/21  0704   WBC 9.4   RBC 3.35*   HGB 10.9*   HCT 32.5*          Basic Metabolic Panel:  Recent Labs   Lab 09/18/21  0711 09/17/21  0615 09/17/21  0003 09/16/21  0707 09/15/21  0645 09/14/21  0637 09/13/21  1915 09/13/21  0704   NA  --   --   --   --   --   --   --  145   POTASSIUM 3.9 3.9 3.4* 3.4* 3.6 3.6   < > 3.4*   CHLORIDE  --   --   --   --   --   --   --  105   CO2  --   --   --   --   --   --   --  29   BUN  --   --   --   --   --   --   --  37*   CR  --   --   --   --   --   --   --  0.80   GLC  --   --   --   --   --   --   --  120   RAGINI  --   --   --   --   --   --   --  10.9*    < > = values in this interval not displayed.       INRNo lab results found in last 7 days.     Recent Labs   Lab Test 09/13/21  0704 09/06/21  0700 05/16/21  0405 05/15/21  2125 05/15/21  1526 05/15/21  1222   INR  --   --   --  1.20*  --  1.16*   WBC 9.4 9.6   < > 12.3*   < > 16.5*   HGB 10.9* 11.5*   < > 11.8*   < > 13.6    314   < > 239   < > 257    < > = values in this interval not displayed.       Personally reviewed current labs    Elpidio Rowley PA-C  [facility]  [phone]

## 2021-09-18 NOTE — PLAN OF CARE
Problem: Mechanical Ventilation  Goal: Patient will maintain patent airway  Outcome: Progressing  Goal: Tracheostomy will be managed safely  Outcome: Progressing    RT PROGRESS NOTE     DATA:     CURRENT SETTINGS:               TRACH TYPE / SIZE: #6 [place], placed 8/29/21             MODE:  capped   FIO2:    RA     ACTION:             THERAPIES: Duo/ Mucomyst neb, Vest Tx given on scheduled time             SUCTION:                           FREQUENCY: 2                        AMOUNT: small                        CONSISTENCY: thick                        COLOR:   white/ pale yellow             SPONTANEOUS COUGH EFFORT/STRENGTH OF EFFORT (not elicited by suctioning):  yes/ strong, swallow part of secretions, using [place] for small pale yellow secretions.                          WEANING PHASE:   3                        WEAN MODE: capped/ RA                        WEAN TIME: cont as tolerated                        END WEAN REASON:      RESPONSE:             BS:  clear decreased - clear decreased on R side, clear on L side after Sx              VITAL SIGNS: O2 sat 94-96%, HR 86-97, RR 20             EMOTIONAL NEEDS / CONCERNS:  None               RISK FOR SELF DECANNULATION:  Yes                        RISK DUE TO: Confusion                        INTERVENTION/S IN PLACE IS/ARE: nONE       NOTE / PLAN:  cont current plan of care - cap trach as tolerated.

## 2021-09-19 LAB
POTASSIUM BLD-SCNC: 3.7 MMOL/L (ref 3.5–5)
SARS-COV-2 RNA RESP QL NAA+PROBE: NEGATIVE

## 2021-09-19 PROCEDURE — 999N000009 HC STATISTIC AIRWAY CARE

## 2021-09-19 PROCEDURE — 94640 AIRWAY INHALATION TREATMENT: CPT

## 2021-09-19 PROCEDURE — 250N000009 HC RX 250: Performed by: INTERNAL MEDICINE

## 2021-09-19 PROCEDURE — 250N000013 HC RX MED GY IP 250 OP 250 PS 637: Performed by: INTERNAL MEDICINE

## 2021-09-19 PROCEDURE — 36415 COLL VENOUS BLD VENIPUNCTURE: CPT | Performed by: INTERNAL MEDICINE

## 2021-09-19 PROCEDURE — 84132 ASSAY OF SERUM POTASSIUM: CPT | Performed by: INTERNAL MEDICINE

## 2021-09-19 PROCEDURE — 250N000013 HC RX MED GY IP 250 OP 250 PS 637: Performed by: NURSE PRACTITIONER

## 2021-09-19 PROCEDURE — 99233 SBSQ HOSP IP/OBS HIGH 50: CPT | Performed by: INTERNAL MEDICINE

## 2021-09-19 PROCEDURE — 250N000013 HC RX MED GY IP 250 OP 250 PS 637: Performed by: HOSPITALIST

## 2021-09-19 PROCEDURE — 999N000157 HC STATISTIC RCP TIME EA 10 MIN

## 2021-09-19 PROCEDURE — 94664 DEMO&/EVAL PT USE INHALER: CPT

## 2021-09-19 PROCEDURE — 94640 AIRWAY INHALATION TREATMENT: CPT | Mod: 76

## 2021-09-19 PROCEDURE — 120N000001 HC R&B MED SURG/OB

## 2021-09-19 PROCEDURE — 250N000011 HC RX IP 250 OP 636

## 2021-09-19 PROCEDURE — 250N000013 HC RX MED GY IP 250 OP 250 PS 637

## 2021-09-19 RX ADMIN — HEPARIN SODIUM 5000 UNITS: 5000 INJECTION, SOLUTION INTRAVENOUS; SUBCUTANEOUS at 13:56

## 2021-09-19 RX ADMIN — NYSTATIN 500000 UNITS: 100000 SUSPENSION ORAL at 08:47

## 2021-09-19 RX ADMIN — METHYLPHENIDATE HYDROCHLORIDE 5 MG: 5 TABLET ORAL at 11:51

## 2021-09-19 RX ADMIN — Medication 2 PACKET: at 20:55

## 2021-09-19 RX ADMIN — ACETYLCYSTEINE 2 ML: 200 SOLUTION ORAL; RESPIRATORY (INHALATION) at 07:25

## 2021-09-19 RX ADMIN — NYSTATIN 500000 UNITS: 100000 SUSPENSION ORAL at 13:55

## 2021-09-19 RX ADMIN — AMLODIPINE BESYLATE 10 MG: 10 TABLET ORAL at 08:45

## 2021-09-19 RX ADMIN — VENLAFAXINE 50 MG: 25 TABLET ORAL at 11:52

## 2021-09-19 RX ADMIN — IPRATROPIUM BROMIDE AND ALBUTEROL SULFATE 3 ML: 2.5; .5 SOLUTION RESPIRATORY (INHALATION) at 07:25

## 2021-09-19 RX ADMIN — VENLAFAXINE 50 MG: 25 TABLET ORAL at 16:38

## 2021-09-19 RX ADMIN — HEPARIN SODIUM 5000 UNITS: 5000 INJECTION, SOLUTION INTRAVENOUS; SUBCUTANEOUS at 21:05

## 2021-09-19 RX ADMIN — ANORECTAL OINTMENT: 15.7; .44; 24; 20.6 OINTMENT TOPICAL at 08:47

## 2021-09-19 RX ADMIN — METHYLPHENIDATE HYDROCHLORIDE 10 MG: 5 TABLET ORAL at 08:51

## 2021-09-19 RX ADMIN — ACETYLCYSTEINE 2 ML: 200 SOLUTION ORAL; RESPIRATORY (INHALATION) at 21:10

## 2021-09-19 RX ADMIN — VENLAFAXINE 50 MG: 25 TABLET ORAL at 05:53

## 2021-09-19 RX ADMIN — IPRATROPIUM BROMIDE AND ALBUTEROL SULFATE 3 ML: 2.5; .5 SOLUTION RESPIRATORY (INHALATION) at 21:10

## 2021-09-19 RX ADMIN — ANORECTAL OINTMENT: 15.7; .44; 24; 20.6 OINTMENT TOPICAL at 20:56

## 2021-09-19 RX ADMIN — FAMOTIDINE 20 MG: 20 TABLET, FILM COATED ORAL at 20:55

## 2021-09-19 RX ADMIN — FAMOTIDINE 20 MG: 20 TABLET, FILM COATED ORAL at 08:45

## 2021-09-19 RX ADMIN — Medication 2 PACKET: at 08:46

## 2021-09-19 RX ADMIN — MIRTAZAPINE 15 MG: 15 TABLET, FILM COATED ORAL at 20:55

## 2021-09-19 RX ADMIN — HEPARIN SODIUM 5000 UNITS: 5000 INJECTION, SOLUTION INTRAVENOUS; SUBCUTANEOUS at 05:59

## 2021-09-19 RX ADMIN — HYDROCORTISONE: 1 CREAM TOPICAL at 08:47

## 2021-09-19 RX ADMIN — HYDROCORTISONE: 1 CREAM TOPICAL at 20:56

## 2021-09-19 RX ADMIN — Medication 2 PACKET: at 13:56

## 2021-09-19 RX ADMIN — VENLAFAXINE 25 MG: 25 TABLET ORAL at 05:53

## 2021-09-19 RX ADMIN — ANORECTAL OINTMENT: 15.7; .44; 24; 20.6 OINTMENT TOPICAL at 13:57

## 2021-09-19 NOTE — PLAN OF CARE
"  Problem: Device-Related Complication Risk (Artificial Airway)  Goal: Optimal Device Function  Outcome: Improving  Intervention: Optimize Device Care and Function  Recent Flowsheet Documentation  Taken 9/18/2021 1655 by Lakshmi Esqueda RN  Airway Safety Measures:   all equipment/monitors on and audible   suction at bedside   Patient was on trache to room air as tolerated this evening. He was suctioned of loose to thick whitish secretions in moderate amounts once by this writer. He was encouraged to use the [place] to have his secretions per orem sucked but he shook his head. This writer asked, \"Did you swallow it?\" and he answered \"Yes\".    Problem: Adult Inpatient Plan of Care  Goal: Optimal Comfort and Wellbeing  Outcome: Improving  Intervention: Provide Person-Centered Care  Recent Flowsheet Documentation  Taken 9/18/2021 1655 by Lakshmi Esqueda RN  Trust Relationship/Rapport: care explained     Problem: Anxiety  Goal: Anxiety Reduction or Resolution  Outcome: Improving  Intervention: Promote Anxiety Reduction  Recent Flowsheet Documentation  Taken 9/18/2021 1655 by Lakshmi Esqueda RN  Family/Support System Care: involvement promoted   He denied pains the entire shift. His wife was monitoring patient and watching Staff and caregivers come and go into and out of the room. At one point wife noticed the patient took off the split gauze from his trache site and called the Unit by phone. Explained to the wife that  if patient is not redirectable at this ponit, we'll monitor him closely and if medicine does not help manage his impulsiveness/agitations; MD might have to order  for Mitt restraints like before. Scheduled dose of Mirtazapine at bedtime was helpful though. Will keep watching patient from the Nursing station  across from his room.   "

## 2021-09-19 NOTE — PLAN OF CARE
Problem: Device-Related Complication Risk (Artificial Airway)  Goal: Optimal Device Function  Outcome: No Change     Problem: Skin and Tissue Injury (Artificial Airway)  Goal: Absence of Device-Related Skin or Tissue Injury  Outcome: No Change     Problem: Communication Impairment (Artificial Airway)  Goal: Effective Communication  Outcome: No Change     Respiratory Care Progress Note    Airway/oxygen:    Current settings: capped, room air  Trach type/size: #6 placed on 8/29/2021    Actions:    Medications/therapies: Duoneb BID, Mucomyst BID  Overnight suctioning   Frequency: 3x   Amount: large   Consistency: thick   Color: white   Spontaneous effort: good, strong    Weaning phase: 3  Daytime plan: capped, room air  Nighttime plan: capped, room air    Response    Breath sounds: diminished  Vital signs: Temp: 97.1  F (36.2  C) Temp src: Axillary BP: 129/81 Pulse: 82   Resp: 20 SpO2: 95 % O2 Device: None (Room air)    Risk for self decannulation: no    Note/Plan: continue plan of care

## 2021-09-19 NOTE — PROGRESS NOTES
[facility]    Medicine Progress Note - Hospitalist Service       Date of Admission:  6/11/2021    Assessment & Plan          Summary:     Dayron Neri is a 70 year male with h/o HTN who presented to ED on 5/15/2021 with acute SAH after a fall with unresponsiveness.  He underwent emergent left external ventricular drain placement.  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  On 5/28/2021, patient had persistent cerebral vasospasm, CT head showed bilateral BAYLEE infarct and was treated with milrinone and verapamil.  On 6/1/2021, angiogram showed no evidence of vasospasm.  Extubated on 6/3/2021.  6/6/2021 he was reintubated due to hypoxia.  On 6/7/2021, patient underwent tracheostomy and PEG tube placement.  EVD removed 6/9/2021.   Patient was treated for possible pneumonia with Unasyn on 5/20/2021, It was switched to ceftriaxone the next day for sputum culture growing Klebsiella. On 5/23, sputum culture also grew Pseudomonas. Antibiotic was switched to Zosyn.  Due to increased WBCs in CSF, possibility of ventriculitis was raised. CSF culture was negative. Antibiotic was switched to cefepime and vancomycin on 5/28/2021 for 2 weeks.  Vanco was discontinued on 6/9/2021.  On 6/5/2021, sputum grew ESBL so cefepime was switched to meropenem. He was transferred to [facility] on 6/11/21.      Repeat sputum culture 6/19 showed MDR pseudomonas and he was started on tobramycin nebs from 6/24/2021 - 7/9/2021 .  7/8/2021 aspirated tube feeds. Spiked a fever that evening 102.8 and WBC went up to 36.9 the next morning so ID reconsulted and was started on ceftazadime-avibactam along with flagyl 7/9/2021 - 7/18/21. He has had repeated bouts of Klebsiella infection in sputum and ventilator associated pneumonia.  Additionally he has had a large amount of tracheal secretions, significant cognitive impairment, left-sided neglect and ongoing episodes of  agitation.  Patient's family has been monitoring the patient continuously during the daytime hours via video monitor and visit with him in person over the weekends.     9/19/21:   Nuvigil stopped last week as it wasn't helping and ritalin restarted 9/12/21 - his wife thinks it has only helped a little so Noon dose was added 9/16/21.  She is not sure if the additional Noon dose is helping.  Video swallow 9/16/21 again shows silent aspiration.  Respiratory status - Phase 3 Wean - FiO2 21%, Capped continuously.    On Duo-neb four times a day for pulmonary hygiene. Stopped 3% saline nebs 8/27/21 per pulmonary   Still having  Secretions, Suctioning 1-3 x/shift. On scopolamine patch    Barriers to discharge: placement to SNF with trach care      Assessment & Plan     Acute on chronic hypoxemic respiratory failure:    S/p treatment pseudomonas ESBL and Klebsiella pneumonia. Has excessive secretions. Weaning has been slow. Per pulmonary, his cognition prevents him from improving further. His pulmonary status could improve more if his cognition improves.   - Has been capped on room air since 8/31. Per pulmonary, no plan for decannulation due to excessive secretions.   - Continue to wean per RT and pulmonary.   - Airway secretion: Glycopyrrolate was tried and stopped. Currently on Scopolamine (started 7/20/2021)  - Continue  Duoneb, acetylcysteine      Acute metabolic encephalopathy/cognitive impairment:  - Likely most of what we are seeing now is chronic due to frontal lobe injury.  Wife is still hopeful he will improve but its unclear if he has the capacity to focus and learn what they are attempting to teach him during therapy.  - Continue effexor, increased ritalin to see if that helps.  - Patient on Effexor for agitation, increased on 9/11/21 to 175 mg total     Ventilator associated pneumonia: Completed antibiotics on July 18.     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 sputum culture 6/19 showed MDR pseudomonas. completed Tobramycin nebs 6/24/2021 - 7/9/2021.   - Continue pulmonary toilet, scheduled duonebs.      Traumatic brain injury/intracranial hemorrage: 6/14/21 CT head done for fatigue and read as slight increase in caliber of lateral and third ventricle with possibility of developing communicating hydrocephalus. Discussed with Dr. Casillas (neurosurgeon at [facility]), who did not think there was much change. CT head repeated on 6/16/21 and 9/8/21, which did not show any change.  - Need follow up with Dr. Martinez with Neurosurgery in 3 months with repeat CTA of head and neck for post-op f/u. Next CTA of head and neck mid September - placed order to get that arranged.     Dysphagia:   -Patient initially failed the blue dye test 7/20/21 with immediate aspiration but did pass the blue dye test a week later but still high risk for aspiration.    - Speech therapy following. See note from 8/17/21. Per speech therapist, despite several weeks of working with patient there has been \"no functional improvement in patient's ability to manage his oropharyngeal secretions.\"  Speech discussed this with wife. Intensive dysphagia treatment has been discontinued and speech therapy is still continuing to follow   - Still having significant secretions, keep NPO with tube feeds due to concerns with aspiration - video swallow 9/16/2021 showed silent aspiration.    Hypercalcemia   - Calcium has been high normal since early July averaging 10.4-10.9. Ca+ 10.9 last check 9/13/21,  Ionized calcium 1.4-1.45 on 8/11/21, Vitamin D level 30, PTH 26   - Nephrology following   - likely secondary to prolonged hospitalization/immobilization and CKD III based on 24 hour clearance estimating GFR 45  - Tube feeds were adjusted to 800 mg less of calcium per day    Chronic kidney disease (CKD) stage 3a  - creat stable to improved (even though serum creat appears ok, his 24 hour urine shows renal " decline and given decreased muscle mass his serum creatinine should be lower than it is if his renal function were normal)).    Hypernatremia  - resolved    Malnutrition:    - Level of malnutrition: Severe   - Based on: moderate/severe subcutaneous fat loss, moderate/severe muscle loss  - continuous tube feeding. Dietician following      Anemia: hemoglobin stable 10.8. Continue to monitor.      Essential hypertension: BP controlled. On lisinopril     Severe debility, weakness, critical illness, deconditioning, Continue PT/OT     Insomnia:   - Continue nighttime mirtazapine 7.5 mg scheduled.    Left inguinal hernia: US on 6/22/21 shows large left inguinal hernia containing mesenteric fat and bowel, extending to the superior scrotum, no evidence of intratesticular mass. Currently has no acute issue and no intervention indicated.    Slow transit constipation  -Constipated in past.  Continue as needed bowel regimen     Diet: Adult Formula Drip Feeding: Continuous [employer] Peptide 1.5; Gastrostomy; Goal Rate: 90 mL/hr; Medication - Feeding Tube Flush Frequency: At least 15-30 mL water before and after medication administration and with tube clogging   DVT Prophylaxis: Heparin SQ   Gutierrez Catheter: Not present   Central Lines: None   Code Status: Full Code         Disposition Plan   Expected discharge: to be determined. Barriers: bilateral hand mitts and frequent trach suctioning    ___________________________________________________________________    Interval History   No new changes, restless night.  Patient denies any pain or problems.     Data reviewed today: I reviewed all medications, new labs and imaging results over the last 24 hours..    Physical Exam   Vital Signs: Temp: 98.3  F (36.8  C) Temp src: Axillary BP: 119/81 Pulse: 81   Resp: 24 SpO2: 95 % O2 Device: None (Room air)    Weight: 165 lbs 4.8 oz  General Appearance:  Alert, in chair, gesturing with right hand, no apparent  distress   Respiratory: coarse anterior breath sounds, no wheezing or crackles, tracheostomy patent with no visible secretions, PMV in place, surrounding skin around trach covered with gauze, no bleeding around trach site   Cardiovascular: S1,S2, rhythm rate regular, negative murmur   GI: Bowel sounds positive x 4, non distended non tender, G/J tube appears patent with tube feeding infusing, surrounding skin dry/intact  Skin: pink dry and intact  Neurological: alert and oriented x 1, smiles, whispers/mouths words when speaking valve in,  partially understandable, moving bilaterally upper extremities, right greater than left with chronic left sided neglet, does follow a few simple commands but is somewhat inconsistent    Data   Recent Labs   Lab 09/19/21  0705 09/18/21  0711 09/17/21  0615 09/13/21  1915 09/13/21  0704   WBC  --   --   --   --  9.4   HGB  --   --   --   --  10.9*   MCV  --   --   --   --  97   PLT  --   --   --   --  338   NA  --   --   --   --  145   POTASSIUM 3.7 3.9 3.9   < > 3.4*   CHLORIDE  --   --   --   --  105   CO2  --   --   --   --  29   BUN  --   --   --   --  37*   CR  --   --   --   --  0.80   ANIONGAP  --   --   --   --  11   RAGINI  --   --   --   --  10.9*   GLC  --   --   --   --  120    < > = values in this interval not displayed.     Medications     dextrose       sodium chloride 25 mL/hr at 09/10/21 0619       acetylcysteine  2 mL Nebulization BID     amLODIPine  10 mg Oral or Feeding Tube Daily     famotidine  20 mg Oral or Feeding Tube BID     fiber modular (NUTRISOURCE FIBER)  2 packet Per G Tube TID     heparin ANTICOAGULANT  5,000 Units Subcutaneous Q8H     hydrocortisone   Topical BID     ipratropium - albuterol 0.5 mg/2.5 mg/3 mL  3 mL Nebulization BID     menthol-zinc oxide   Topical TID     methylphenidate  10 mg Oral or Feeding Tube Daily     methylphenidate  5 mg Oral or Feeding Tube Daily     mirtazapine  15 mg Per Feeding Tube At Bedtime     nystatin  500,000 Units Oral  4x Daily     scopolamine  1 patch Transdermal Q72H    And     scopolamine   Transdermal Q8H     venlafaxine  25 mg Per Feeding Tube QAM     venlafaxine  50 mg Per Feeding Tube TID w/meals     Restraint:     Patient pulls his IV line and trach.      Within an hour after restraint an in person face to face assessment was completed at 8:00 AM, including an evaluation of the patient's immediate reaction to the intervention, behavioral assessment and review/assessment of history, drugs and medications, recent labs, etc., and behavioral condition.  The patient experienced no adverse physical outcome from seclusion/restraint initiation.  The intervention of restraint or seclusion needs to continue

## 2021-09-19 NOTE — PLAN OF CARE
Problem: Adult Inpatient Plan of Care  Goal: Optimal Comfort and Wellbeing  Outcome: Improving  Intervention: Provide Person-Centered Care  Recent Flowsheet Documentation  Taken 9/19/2021 0572 by Елена Rai, RN  Trust Relationship/Rapport:   care explained   reassurance provided     Problem: Anxiety  Goal: Anxiety Reduction or Resolution  Outcome: Improving     Problem: Pain Chronic (Persistent)  Goal: Acceptable Pain Control and Functional Ability  Outcome: Improving  Patient was seen with his hand over trach; was coughing and sounds congested.  Patient was redirected, suctioned.  Patient calmer after intervention.  Patient denied pain; anxious at times during repositioning; redirectable.

## 2021-09-19 NOTE — PLAN OF CARE
"  Problem: Device-Related Complication Risk (Artificial Airway)  Goal: Optimal Device Function  Outcome: No Change     Problem: Skin and Tissue Injury (Artificial Airway)  Goal: Absence of Device-Related Skin or Tissue Injury  Outcome: No Change     Problem: Communication Impairment (Artificial Airway)  Goal: Effective Communication  Outcome: Improving   RT PROGRESS NOTE     DATA:     CURRENT SETTINGS:             TRACH TYPE / SIZE:  #6 bivona changed 8/29             MODE:   capped on RA                 ACTION:             THERAPIES:   duo neb and mucomyst bid             SUCTION:                           FREQUENCY:   x1                        AMOUNT:   large                        CONSISTENCY:   thick tenacious                        COLOR:   white clear             SPONTANEOUS COUGH EFFORT/STRENGTH OF EFFORT (not elicited by suctioning): moderate to strong,                               WEANING PHASE:   3                        WEAN MODE:    capped on RA continuous     RESPONSE:             BS:   diminished and clear             VITAL SIGNS:   Blood pressure 136/82, pulse 85, temperature 97.5  F (36.4  C), temperature source Oral, resp. rate 22, height 1.803 m (5' 10.98\"), weight 75 kg (165 lb 4.8 oz), SpO2 93 %.                 EMOTIONAL NEEDS / CONCERNS:  no                RISK FOR SELF DECANNULATION:  yes                        RISK DUE TO:  confusion                        INTERVENTION/S IN PLACE IS/ARE:  monitor close       NOTE / PLAN:   Continue with aggressive oral cares.  Pt is a mouth breather and has dry upper airway in the morning.    "

## 2021-09-20 ENCOUNTER — APPOINTMENT (OUTPATIENT)
Dept: PHYSICAL THERAPY | Facility: CLINIC | Age: 71
DRG: 207 | End: 2021-09-20
Attending: HOSPITALIST
Payer: COMMERCIAL

## 2021-09-20 ENCOUNTER — APPOINTMENT (OUTPATIENT)
Dept: OCCUPATIONAL THERAPY | Facility: CLINIC | Age: 71
DRG: 207 | End: 2021-09-20
Attending: HOSPITALIST
Payer: COMMERCIAL

## 2021-09-20 ENCOUNTER — APPOINTMENT (OUTPATIENT)
Dept: SPEECH THERAPY | Facility: CLINIC | Age: 71
DRG: 207 | End: 2021-09-20
Attending: HOSPITALIST
Payer: COMMERCIAL

## 2021-09-20 LAB
ANION GAP SERPL CALCULATED.3IONS-SCNC: 11 MMOL/L (ref 5–18)
BASOPHILS # BLD AUTO: 0 10E3/UL (ref 0–0.2)
BASOPHILS NFR BLD AUTO: 1 %
BUN SERPL-MCNC: 32 MG/DL (ref 8–22)
CALCIUM SERPL-MCNC: 10.5 MG/DL (ref 8.5–10.5)
CHLORIDE BLD-SCNC: 103 MMOL/L (ref 98–107)
CO2 SERPL-SCNC: 27 MMOL/L (ref 22–31)
CREAT SERPL-MCNC: 0.66 MG/DL (ref 0.7–1.3)
EOSINOPHIL # BLD AUTO: 0.1 10E3/UL (ref 0–0.7)
EOSINOPHIL NFR BLD AUTO: 1 %
ERYTHROCYTE [DISTWIDTH] IN BLOOD BY AUTOMATED COUNT: 13 % (ref 10–15)
GFR SERPL CREATININE-BSD FRML MDRD: >90 ML/MIN/1.73M2
GLUCOSE BLD-MCNC: 124 MG/DL (ref 70–125)
HCT VFR BLD AUTO: 32.2 % (ref 40–53)
HGB BLD-MCNC: 10.6 G/DL (ref 13.3–17.7)
IMM GRANULOCYTES # BLD: 0 10E3/UL
IMM GRANULOCYTES NFR BLD: 0 %
LYMPHOCYTES # BLD AUTO: 1.3 10E3/UL (ref 0.8–5.3)
LYMPHOCYTES NFR BLD AUTO: 16 %
MAGNESIUM SERPL-MCNC: 2.1 MG/DL (ref 1.8–2.6)
MCH RBC QN AUTO: 31.5 PG (ref 26.5–33)
MCHC RBC AUTO-ENTMCNC: 32.9 G/DL (ref 31.5–36.5)
MCV RBC AUTO: 96 FL (ref 78–100)
MONOCYTES # BLD AUTO: 0.7 10E3/UL (ref 0–1.3)
MONOCYTES NFR BLD AUTO: 9 %
NEUTROPHILS # BLD AUTO: 5.8 10E3/UL (ref 1.6–8.3)
NEUTROPHILS NFR BLD AUTO: 73 %
NRBC # BLD AUTO: 0 10E3/UL
NRBC BLD AUTO-RTO: 0 /100
PLATELET # BLD AUTO: 351 10E3/UL (ref 150–450)
POTASSIUM BLD-SCNC: 3.5 MMOL/L (ref 3.5–5)
RBC # BLD AUTO: 3.36 10E6/UL (ref 4.4–5.9)
SODIUM SERPL-SCNC: 141 MMOL/L (ref 136–145)
WBC # BLD AUTO: 7.9 10E3/UL (ref 4–11)

## 2021-09-20 PROCEDURE — 999N000009 HC STATISTIC AIRWAY CARE

## 2021-09-20 PROCEDURE — 94640 AIRWAY INHALATION TREATMENT: CPT

## 2021-09-20 PROCEDURE — 250N000009 HC RX 250: Performed by: INTERNAL MEDICINE

## 2021-09-20 PROCEDURE — 97112 NEUROMUSCULAR REEDUCATION: CPT | Mod: GP | Performed by: PHYSICAL THERAPIST

## 2021-09-20 PROCEDURE — 250N000013 HC RX MED GY IP 250 OP 250 PS 637: Performed by: NURSE PRACTITIONER

## 2021-09-20 PROCEDURE — 94640 AIRWAY INHALATION TREATMENT: CPT | Mod: 76

## 2021-09-20 PROCEDURE — 99232 SBSQ HOSP IP/OBS MODERATE 35: CPT | Performed by: INTERNAL MEDICINE

## 2021-09-20 PROCEDURE — 999N000157 HC STATISTIC RCP TIME EA 10 MIN

## 2021-09-20 PROCEDURE — 999N000123 HC STATISTIC OXYGEN O2DAILY TECH TIME

## 2021-09-20 PROCEDURE — 250N000013 HC RX MED GY IP 250 OP 250 PS 637: Performed by: INTERNAL MEDICINE

## 2021-09-20 PROCEDURE — 250N000013 HC RX MED GY IP 250 OP 250 PS 637

## 2021-09-20 PROCEDURE — 97129 THER IVNTJ 1ST 15 MIN: CPT | Mod: GO | Performed by: OCCUPATIONAL THERAPIST

## 2021-09-20 PROCEDURE — 92526 ORAL FUNCTION THERAPY: CPT | Mod: GN | Performed by: SPEECH-LANGUAGE PATHOLOGIST

## 2021-09-20 PROCEDURE — 36415 COLL VENOUS BLD VENIPUNCTURE: CPT | Performed by: NURSE PRACTITIONER

## 2021-09-20 PROCEDURE — 250N000013 HC RX MED GY IP 250 OP 250 PS 637: Performed by: HOSPITALIST

## 2021-09-20 PROCEDURE — 97530 THERAPEUTIC ACTIVITIES: CPT | Mod: GP | Performed by: PHYSICAL THERAPIST

## 2021-09-20 PROCEDURE — 85025 COMPLETE CBC W/AUTO DIFF WBC: CPT | Performed by: NURSE PRACTITIONER

## 2021-09-20 PROCEDURE — 97530 THERAPEUTIC ACTIVITIES: CPT | Mod: GO | Performed by: OCCUPATIONAL THERAPIST

## 2021-09-20 PROCEDURE — 83735 ASSAY OF MAGNESIUM: CPT | Performed by: INTERNAL MEDICINE

## 2021-09-20 PROCEDURE — 92507 TX SP LANG VOICE COMM INDIV: CPT | Mod: GN | Performed by: SPEECH-LANGUAGE PATHOLOGIST

## 2021-09-20 PROCEDURE — 250N000011 HC RX IP 250 OP 636

## 2021-09-20 PROCEDURE — 120N000001 HC R&B MED SURG/OB

## 2021-09-20 PROCEDURE — 82310 ASSAY OF CALCIUM: CPT | Performed by: INTERNAL MEDICINE

## 2021-09-20 RX ADMIN — IPRATROPIUM BROMIDE AND ALBUTEROL SULFATE 3 ML: 2.5; .5 SOLUTION RESPIRATORY (INHALATION) at 21:09

## 2021-09-20 RX ADMIN — METHYLPHENIDATE HYDROCHLORIDE 5 MG: 5 TABLET ORAL at 11:36

## 2021-09-20 RX ADMIN — Medication 2 PACKET: at 08:06

## 2021-09-20 RX ADMIN — HEPARIN SODIUM 5000 UNITS: 5000 INJECTION, SOLUTION INTRAVENOUS; SUBCUTANEOUS at 21:40

## 2021-09-20 RX ADMIN — ANORECTAL OINTMENT: 15.7; .44; 24; 20.6 OINTMENT TOPICAL at 20:24

## 2021-09-20 RX ADMIN — HYDROCORTISONE: 1 CREAM TOPICAL at 20:25

## 2021-09-20 RX ADMIN — HYDROCORTISONE: 1 CREAM TOPICAL at 08:06

## 2021-09-20 RX ADMIN — AMLODIPINE BESYLATE 10 MG: 10 TABLET ORAL at 08:06

## 2021-09-20 RX ADMIN — VENLAFAXINE 50 MG: 25 TABLET ORAL at 17:09

## 2021-09-20 RX ADMIN — VENLAFAXINE 25 MG: 25 TABLET ORAL at 06:05

## 2021-09-20 RX ADMIN — FAMOTIDINE 20 MG: 20 TABLET, FILM COATED ORAL at 20:32

## 2021-09-20 RX ADMIN — MIRTAZAPINE 15 MG: 15 TABLET, FILM COATED ORAL at 20:32

## 2021-09-20 RX ADMIN — ACETYLCYSTEINE 2 ML: 200 SOLUTION ORAL; RESPIRATORY (INHALATION) at 21:09

## 2021-09-20 RX ADMIN — Medication 2 PACKET: at 20:36

## 2021-09-20 RX ADMIN — ANORECTAL OINTMENT: 15.7; .44; 24; 20.6 OINTMENT TOPICAL at 13:18

## 2021-09-20 RX ADMIN — HEPARIN SODIUM 5000 UNITS: 5000 INJECTION, SOLUTION INTRAVENOUS; SUBCUTANEOUS at 13:18

## 2021-09-20 RX ADMIN — VENLAFAXINE 50 MG: 25 TABLET ORAL at 06:05

## 2021-09-20 RX ADMIN — VENLAFAXINE 50 MG: 25 TABLET ORAL at 11:37

## 2021-09-20 RX ADMIN — IPRATROPIUM BROMIDE AND ALBUTEROL SULFATE 3 ML: 2.5; .5 SOLUTION RESPIRATORY (INHALATION) at 07:22

## 2021-09-20 RX ADMIN — FAMOTIDINE 20 MG: 20 TABLET, FILM COATED ORAL at 08:06

## 2021-09-20 RX ADMIN — Medication 2 PACKET: at 13:21

## 2021-09-20 RX ADMIN — HEPARIN SODIUM 5000 UNITS: 5000 INJECTION, SOLUTION INTRAVENOUS; SUBCUTANEOUS at 06:04

## 2021-09-20 RX ADMIN — ACETYLCYSTEINE 2 ML: 200 SOLUTION ORAL; RESPIRATORY (INHALATION) at 07:26

## 2021-09-20 RX ADMIN — ANORECTAL OINTMENT: 15.7; .44; 24; 20.6 OINTMENT TOPICAL at 08:07

## 2021-09-20 RX ADMIN — METHYLPHENIDATE HYDROCHLORIDE 10 MG: 5 TABLET ORAL at 08:06

## 2021-09-20 ASSESSMENT — MIFFLIN-ST. JEOR: SCORE: 1563.44

## 2021-09-20 NOTE — PROGRESS NOTES
[facility]  [facility]   Follow up    Dayron Neri   MRN: [id number]  : 1950   DOA: 2021   CC: hypercalcemia      Assessment and Plan:  70 year old male    1. Hypercalcemia: thought secondary torelative immobility with prolonged hospitalization; improving with hydration and holding all vit D and calcium supplements; could get bisphosphonate if worsens  2. SAH after a fall 2021; s/p drain and craniotomy with aneurysm clipping c/b bilateral infarct  3. resp failure; copious secretions limite wean off wean; s/p treatment for pneumonia  4. Hypokalemia; replacing as needed  5. Volume status; seems ok; not on any diuretics  6. Mild CKD; normal CR but decrease muscle mass  7. HTN: on norvasc; changed from ACE due to hyperkalemia  8. Anemia; following hgb  9. Malnutrition; on tube feeds  10. Metabolic encephalopathy; on ritalin and remeron      Renal will sign off; please call with any questions or if new concerns arise      Subjective  Pt new to me; chart and meds reviewed  Pt says he is not tired today; tolerating sitting up in chair; wife on Ipad and concerned about this sleep;; says erratic and some days better than others; attempting to be up in chair q day    Objective    Vital signs in last 24 hours  Temp:  [97.5  F (36.4  C)-98.9  F (37.2  C)] 98.3  F (36.8  C)  Pulse:  [79-95] 95  Resp:  [20-22] 22  BP: (119-136)/(74-87) 129/74  SpO2:  [90 %-96 %] 91 %      Intake/Output last 3 shifts  I/O last 3 completed shifts:  In: 3315 [NG/GT:3315]  Out: 3025 [Urine:3025]  Intake/Output this shift:  No intake/output data recorded.    Physical Exam  Alert/oriented x 3, awake, NAD  CV: RRR without murmur or rub  Trach; speaking valve  Lung: clear and equal; no extra sounds  Ab: soft and NT; not distended; normal bs  Ext: no edema and well perfused  Skin; no rash    Pertinent Labs     Last Renal Panel:  Sodium   Date Value Ref Range Status   2021 141 136 - 145 mmol/L  Final   06/11/2021 143 133 - 144 mmol/L Final     Potassium   Date Value Ref Range Status   09/20/2021 3.5 3.5 - 5.0 mmol/L Final   06/11/2021 3.8 3.4 - 5.3 mmol/L Final     Chloride   Date Value Ref Range Status   09/20/2021 103 98 - 107 mmol/L Final   06/11/2021 106 94 - 109 mmol/L Final     Carbon Dioxide   Date Value Ref Range Status   06/11/2021 36 (H) 20 - 32 mmol/L Final     Carbon Dioxide (CO2)   Date Value Ref Range Status   09/20/2021 27 22 - 31 mmol/L Final     Anion Gap   Date Value Ref Range Status   09/20/2021 11 5 - 18 mmol/L Final   06/11/2021 2 (L) 3 - 14 mmol/L Final     Glucose   Date Value Ref Range Status   09/20/2021 124 70 - 125 mg/dL Final   06/11/2021 142 (H) 70 - 99 mg/dL Final     Urea Nitrogen   Date Value Ref Range Status   09/20/2021 32 (H) 8 - 22 mg/dL Final   06/11/2021 25 7 - 30 mg/dL Final     Creatinine   Date Value Ref Range Status   09/20/2021 0.66 (L) 0.70 - 1.30 mg/dL Final   06/11/2021 0.57 (L) 0.66 - 1.25 mg/dL Final     GFR Estimate   Date Value Ref Range Status   09/20/2021 >90 >60 mL/min/1.73m2 Final     Comment:     As of July 11, 2021, eGFR is calculated by the CKD-EPI creatinine equation, without race adjustment. eGFR can be influenced by muscle mass, exercise, and diet. The reported eGFR is an estimation only and is only applicable if the renal function is stable.   07/09/2021 >60 >60 mL/min/1.73m2 Final   06/11/2021 >90 >60 mL/min/[1.73_m2] Final     Comment:     Non  GFR Calc  Starting 12/18/2018, serum creatinine based estimated GFR (eGFR) will be   calculated using the Chronic Kidney Disease Epidemiology Collaboration   (CKD-EPI) equation.       Calcium   Date Value Ref Range Status   09/20/2021 10.5 8.5 - 10.5 mg/dL Final   06/11/2021 9.6 8.5 - 10.1 mg/dL Final     Phosphorus   Date Value Ref Range Status   09/10/2021 3.5 2.5 - 4.5 mg/dL Final   06/11/2021 2.9 2.5 - 4.5 mg/dL Final     Albumin   Date Value Ref Range Status   09/10/2021 3.3 (L) 3.5  - 5.0 g/dL Final   06/02/2021 1.7 (L) 3.4 - 5.0 g/dL Final     CBC RESULTS:   Recent Labs   Lab Test 09/20/21  0701   WBC 7.9   RBC 3.36*   HGB 10.6*   HCT 32.2*   MCV 96   MCH 31.5   MCHC 32.9   RDW 13.0           UA RESULTS:  Recent Labs   Lab Test 07/01/21  1447 06/22/21  1145 06/14/21  1609   COLOR Light Yellow   < > Light Yellow   APPEARANCE Clear   < > Clear   URINEGLC Negative   < > Negative   URINEBILI Negative   < > Negative   URINEKETONE Negative   < > Negative   SG 1.011   < > 1.017   UBLD Negative   < > Negative   URINEPH 7.5   < > 7.0   PROTEIN Negative   < > 50 mg/dL*   UROBILINOGEN <2.0 mg/dL   < > <2.0 mg/dL   NITRITE Negative   < > Negative   LEUKEST Negative   < > Negative   RBCU  --   --  2   WBCU  --   --  4    < > = values in this interval not displayed.        Results for orders placed or performed during the hospital encounter of 06/11/21   CT Head w/o Contrast    Impression    1.  Slight interval increase in the caliber of the lateral and third ventricles without evidence for outflow obstruction. Developing communicating hydrocephalus is possible. Follow-up is advised.  2.  Multi compartmental intracranial hemorrhage with decreasing conspicuity of hyperattenuating blood products compared to 06/11/2021. No new hemorrhage or enlarging extra-axial collection identified.  3.  Subacute infarct of the parasagittal right frontal lobe.   XR Chest Port 1 View    Impression    The tracheostomy projects within the tracheal air column. Trachea is midline and central airways are patent.    The lungs are symmetrically inflated. Focal opacity in the medial left base is typical location for subsegmental atelectasis. No interstitial thickening or airspace opacities elsewhere in the lungs.    No pleural fluid or pneumothorax.   CT Head w/o Contrast    Impression    1.  No significant change in the size of the mildly enlarged lateral and third ventricles from CT 06/14/2021. Slight decrease in the  intraventricular hemorrhage in the occipital horns in the interval.  2.  Again seen is a broad area of evolving infarction involving the anterior paramedian right frontal lobe similar in size to the CT 06/14/2021. A small amount of petechial blood products have developed in the central aspect of this infarct since   06/14/2021. No space-occupying hemorrhage. Continued follow-up recommended.  3.  Stable small subacute infarct along the paramedian superior left frontal lobe posteriorly.  4.  Again seen are low density changes associated with a previous left frontal approach ventricular catheter track with decrease in the density of blood products along the catheter track in the interval.  5.  Slight decrease in the scattered subarachnoid hemorrhage in the cerebral sulci in the interval. No definite new subarachnoid hemorrhage.  6.  Stable postop changes right frontotemporal craniotomy with aneurysm clip in the right parasellar region with stable mixed density extra-axial fluid and blood products deep to the craniectomy site.   XR Chest Port 1 View    Impression    Tracheostomy tube in place. Left basilar opacities partially silhouetting the left hemidiaphragm are unchanged and again presumed to reflect some atelectasis. Right lung is clear. Heart and pulmonary vascularity are normal. Gastrostomy tube.   US Testicular & Scrotum w Doppler Ltd    Impression    1.  Large left inguinal hernia containing mesenteric fat and bowel, extending to the superior scrotum.  2.  No evidence of intratesticular mass.  3.  Incidental tiny right epididymal head cyst.   XR Chest Port 1 View    Impression    Tracheostomy tube in good position. Some stable minimal fibrosis left lower lung. Chest otherwise negative. No significant new findings.   CT Head w/o Contrast    Impression    1.  Temporal evolution and subtotal resolution of previously identified multicompartment hyperdense blood products. Specifically, subtotal resolution of  intraparenchymal hemorrhage deep white matter right frontal lobe, intraventricular hemorrhage and of     extra-axial blood products overlying the right frontotemporal level deep to the craniotomy site. No new or progressive hemorrhage is identified    2.  Temporal evolution of presumed subacute phase nonhemorrhagic ischemic change in the right frontal lobe and posterior left frontal paramedian region. No new or progressive areas of recent ischemia are suggested.    3.  Stable ventricular caliber.    4.  Additional details above.   XR Chest Port 1 View    Impression    Tracheostomy tube projects over tracheal air column. Increased discoid atelectasis at the left lung base. No other new focal consolidation. No pneumothorax or pleural effusion. Cardiac silhouette within normal limits. Stable tortuous aorta.   XR Abdomen Port 1 View    Impression    Subsegmental atelectasis in the medial basal left lower lobe.    Percutaneous gastrostomy projects in the left upper quadrant just to the left of L2. No nasoenteric tube is present.     Bowel gas pattern is normal. Nothing for obstruction or free air. No evidence for renal stones.    XR Chest Port 1 View    Impression    IMPRESSION:   No change in tracheostomy tube. Heart and mediastinal size are stable. There is indistinctness of the pulmonary interstitium, representing either airway inflammation or edema. There is bandlike opacity involving the retrocardiac region of the left lung   base, increased from prior study and representing either atelectasis or infectious process. No pleural effusion or pneumothorax. There is some mild elevation of the right hemidiaphragm.   XR Chest 1 View    Impression    IMPRESSION: Midline tracheostomy. Normal heart size. Probable mild left basilar scar. No acute left lung infiltrate. There is a right lung base opacity, increased from previous which could represent worsening pneumonia or atelectasis.   CT Head w/o Contrast    Impression     IMPRESSION:     1. Status post right MCA aneurysm clipping without acute intracranial abnormality.    2. Unchanged encephalomalacia in the right frontal, right temporal, and left paramedian parietal lobes.   XR Video Swallow with SLP or OT    Impression    IMPRESSION:  1.  Penetration and aspiration of honey pureed thickness barium.  2.  Delayed oral phase.  3.  Significant vallecular and piriform stasis.    Please see speech pathology report for greater details.            I reviewed all lab results  Gris Partida MD

## 2021-09-20 NOTE — PLAN OF CARE
Problem: Adult Inpatient Plan of Care  Goal: Optimal Comfort and Wellbeing  Outcome: Improving     Problem: Anxiety  Goal: Anxiety Reduction or Resolution  Outcome: Improving     Problem: Pain Chronic (Persistent)  Goal: Acceptable Pain Control and Functional Ability  Outcome: Improving   Patient denied pain; calm and cooperative with cares.  Patient had infrequent cough; coughed out large amount of phlegm; oral cares done.  Condom catheter intact.  Patient slept most of the night.

## 2021-09-20 NOTE — PROGRESS NOTES
[facility]    Medicine Progress Note - Hospitalist Service       Date of Admission:  6/11/2021    Summary:  Dayron Neri is a 70 year male with h/o HTN who presented to ED on 5/15/2021 with acute SAH after a fall with unresponsiveness.  He underwent emergent left external ventricular drain placement.  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  On 5/28/2021, patient had persistent cerebral vasospasm, CT head showed bilateral BAYLEE infarct and was treated with milrinone and verapamil.  On 6/1/2021, angiogram showed no evidence of vasospasm.  Extubated on 6/3/2021.  6/6/2021 he was reintubated due to hypoxia.  On 6/7/2021, patient underwent tracheostomy and PEG tube placement.  EVD removed 6/9/2021.   Patient was treated for possible pneumonia with Unasyn on 5/20/2021, It was switched to ceftriaxone the next day for sputum culture growing Klebsiella. On 5/23, sputum culture also grew Pseudomonas. Antibiotic was switched to Zosyn.  Due to increased WBCs in CSF, possibility of ventriculitis was raised. CSF culture was negative. Antibiotic was switched to cefepime and vancomycin on 5/28/2021 for 2 weeks.  Vanco was discontinued on 6/9/2021. On 6/5/2021, sputum grew ESBL so cefepime was switched to meropenem. He was transferred to [facility] on 6/11/21.     Since admission to [facility], patient did not make significant progression for his neurology status. Trach weaning is also lack of progression due to his cognitive impairment.       Assessment & Plan           Acute on chronic hypoxemic respiratory failure: S/p treatment pseudomonas ESBL and Klebsiella pneumonia. Has excessive secretions. Weaning has been slow. Per pulmonary, his cognition prevents him from improving further. His pulmonary status could improve more if his cognition improves.   - Has been capped on room air for day and night since 8/31. Per pulmonary, no plan for decannulation due to  "excessive secretions.   - Continue to wean per RT and pulmonary.   - Airway secretion: Glycopyrrolate was tried and stopped. 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 CT head done on 7/1/21 shows resolution of previous multicompartment hemorrhage in the right frontal lobe and the ventricles. CT head repeated on 9/8, shows unchanged encephalomalacia in the right frontal, right temporal, and left paramedian parietal lobes.  - Need follow up with Neurosurgery Dr. Martinez in 3 months post op: plan to arrange a video appointment.      Acute metabolic encephalopathy/cognitive impairment: His current neurology status is likely his new baseline due to frontal lobe injury.  Wife is still hopeful he will improve further but it is unclear if he has the capacity to focus and learn what they are attempting to teach him during therapy.  - Nuvigil started on 8/27 per neurology recommendation, but wife does not think it is helpful. Restarted Ritalin 9/12/21.   - Continue Effexor. Increased on 9/11/21 to 175 mg daily    Dysphagia: On tube feeds and NPO.   - Speech therapy following. Per note from 8/17/21, despite several weeks of working with patient there has been \"no functional improvement in patient's ability to manage his oropharyngeal secretions.\" Speech discussed this with wife. Intensive dysphagia treatment has been discontinued. Repeated video swallow 9/16/2021and showed silent aspiration.    Severe malnutrition: has moderate/severe subcutaneous fat loss, moderate/severe muscle loss  - continuous tube feeding per dietician     Chronic kidney disease (CKD) stage 3a: Creatinine has been stable.    Anemia: hemoglobin stable. Continue to monitor.    Essential hypertension: BP controlled. 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 Sputum culture 6/19 showed MDR pseudomonas. On Tobramycin nebs 6/24/2021 - 7/9/2021.   - Continue pulmonary toilet, scheduled duonebs    Ventilator associated pneumonia: Completed antibiotics on July 18.    Severe debility, weakness, critical illness, deconditioning: Continue PT/OT    Hypercalcemia: unclear etiology, mild at this point. Parathyroid hormone normal. May be related to relative immobilization. Continue to monitor.    Insomnia: Continue nighttime mirtazapine 7.5 mg scheduled.    Left inguinal hernia: US on 6/22/21 shows large left inguinal hernia containing mesenteric fat and bowel, extending to the superior scrotum, no evidence of intratesticular mass. Currently has no acute issue and no intervention indicated.       Diet: Adult Formula Drip Feeding: Continuous Novasource Renal; Gastrostomy; Goal Rate: 65; mL/hr; Medication - Feeding Tube Flush Frequency: At least 15-30 mL water before and after medication administration and with tube clogging; Amount to Send (Nutri...    DVT Prophylaxis: Heparin SQ  Gutierrez Catheter: Not present  Central Lines: None  Code Status: Full Code      Disposition Plan   Expected discharge: placement to trach facility pending    The patient's care was discussed with the Bedside Nurse and Care Coordinator/.    Yazmin Hooper MD  Hospitalist Service  LT[facility]  Securely message with the Vocera Web Console (learn more here)  Text page via [facility] Paging/Directory      ______________________________________________________________________    Interval History   Patient was drowsy when seen and examined this morning. His wife was watching him over the iPAD. She became very concerned. Patient became more wake and alert later the day.      Physical Exam   Vital Signs: Temp: 97.8  F (36.6  C) Temp src: Axillary BP: 115/81 Pulse: 87   Resp: 22 SpO2: 96 % O2  Device: None (Room air)    Weight: 170 lbs 1.6 oz    General appearance: not in acute distress  HEENT: PERRL, EOMI. Trach in place.  Lungs: Clear breath sounds in bilateral lung fields  Cardiovascular: Regular rate and rhythm, normal S1-S2  Abdomen: Soft, non tender, no distension. Left scrotal hernia, no pain on palpation  Musculoskeletal: No joint swelling  Skin: No rash and no edema  Neurology: Awake and alert. Mumble speech and hard to understand. Moves all four extremities.      Data   Recent Labs   Lab 09/20/21  0701 09/19/21  0705 09/18/21  0711   WBC 7.9  --   --    HGB 10.6*  --   --    MCV 96  --   --      --   --      --   --    POTASSIUM 3.5 3.7 3.9   CHLORIDE 103  --   --    CO2 27  --   --    BUN 32*  --   --    CR 0.66*  --   --    ANIONGAP 11  --   --    RAGINI 10.5  --   --      --   --        Restraint:    Patient pulls his IV line and trach.     Within an hour after restraint an in person face to face assessment was completed, including an evaluation of the patient's immediate reaction to the intervention, behavioral assessment and review/assessment of history, drugs and medications, recent labs, etc., and behavioral condition.  The patient experienced: No adverse physical outcome from seclusion/restraint initiation.  The intervention of restraint or seclusion needs to continue.

## 2021-09-20 NOTE — PLAN OF CARE
Problem: Communication Impairment (Artificial Airway)  Goal: Effective Communication  Outcome: Improving     Problem: Device-Related Complication Risk (Artificial Airway)  Goal: Optimal Device Function  Outcome: Improving     Problem: Skin and Tissue Injury (Artificial Airway)  Goal: Absence of Device-Related Skin or Tissue Injury  Outcome: Improving   RT PROGRESS NOTE     DATA:     CURRENT SETTINGS:               TRACH TYPE / SIZE: #6 BIVONA TTS Changed on 8/29/21             MODE: Trach Capped              FIO2:  21%     ACTION:             THERAPIES: DuoNeb/ Mucomyst neb BID                SUCTION:                           FREQUENCY: X2                        AMOUNT: moderate to large                        CONSISTENCY: thick                        COLOR:   White              SPONTANEOUS COUGH EFFORT/STRENGTH OF EFFORT (not elicited by suctioning): Fair                               WEANING PHASE: 3                        WEAN MODE: Trach Capped                         WEAN TIME:  Cont.                        END WEAN REASON:      RESPONSE:             BS :Clear Decreased             VITAL SIGNS: Sat 94-99%, HR 80-88, RR 20-22             EMOTIONAL NEEDS / CONCERNS:confused               RISK FOR SELF DECANNULATION: no                        RISK DUE TO: no                         INTERVENTION/S IN PLACE IS/ARE:      NOTE / PLAN:  Cont. Current plan of care

## 2021-09-20 NOTE — PLAN OF CARE
Problem: Device-Related Complication Risk (Artificial Airway)  Goal: Optimal Device Function  Outcome: No Change     Problem: Skin and Tissue Injury (Artificial Airway)  Goal: Absence of Device-Related Skin or Tissue Injury  Outcome: No Change     Problem: Communication Impairment (Artificial Airway)  Goal: Effective Communication  Outcome: No Change     Respiratory Care Progress Note    Airway/oxygen:    Current settings: capped, room air  Trach type/size: #6 placed on 8/29/2021    Actions:    Medications/therapies: Duoneb BID, Mucomyst BID  Overnight suctioning   Frequency: 3x   Amount: large   Consistency: thick   Color: white   Spontaneous effort: good, strong    Weaning phase: 3  Daytime plan: capped, room air  Nighttime plan: capped, room air    Response    Breath sounds: diminished  Vital signs: Temp: 98.9  F (37.2  C) Temp src: Axillary BP: 119/87 Pulse: 83   Resp: 22 SpO2: 95 % O2 Device: None (Room air)    Risk for self decannulation: no    Note/Plan: continue plan of care

## 2021-09-20 NOTE — PLAN OF CARE
Problem: Adult Inpatient Plan of Care  Goal: Patient-Specific Goal (Individualized)  9/19/2021 2228 by Amanda Jean, RN  Outcome: Improving  9/19/2021 2227 by Amanda Jean RN  Outcome: Improving     Problem: Adult Inpatient Plan of Care  Goal: Absence of Hospital-Acquired Illness or Injury  Intervention: Prevent Infection  Recent Flowsheet Documentation  Taken 9/19/2021 1659 by Amanda Jean RN  Infection Prevention: hand hygiene promoted   Patient was alert, calm, cooperative with all cares. Patient watched football on TV  and enjoyed it. Patient  denied pain.

## 2021-09-20 NOTE — PROGRESS NOTES
Social Work Note:  Patient chart reviewed.  Patient discussed in morning rounds.  Barriers to discharge: chronic trach, Bed availability/staffing issues at facilities.       Writer has called the SNF/TCU nearest patient/spouse, per her request.  Writer has placed phone referrals to the 5 SNF/TCU in their geographical area, and none are accept new trachs.  Writer has placed phone/email  referrals to other SNF/TCU in the [facility] area that can accept new trach patients.  [facility] unable to accept due to capacity, staffing, and patient's cough assist vest.    Writer called and left message for Whit @ [facility], Jesusita Resendiz @ [facility], and Roman @ [facility].  ADD: [facility] can not accept patient..  Left messages today for [facility] @ [facility] and Whit @ [facility]     Dayron Chen, [facility] [place]  [phone]

## 2021-09-20 NOTE — PLAN OF CARE
Plan of Care by Jacqueline Vieyra RN at 2021  9:23 AM     Author: Jacqueline Vieyra RN Service: -- Author Type: RN, Care Manager    Filed: 2021  9:33 AM Date of Service: 2021  9:23 AM Status: Signed    : Jacqueline Vieyra RN (RN, Care Manager)       Care Management Progression of Care Update        DR GLOS - Target D/C Date TBD        PLAN/GOALS  1. Pulmonary following. Phase 3 wean~continous trach capping on room air, tolerated PMV days.  Mucomyst, Duo-neb, two times a day for pulmonary hygiene. Chest vest two time a day.  Frequent suctioning 1-3x/shift.    2.  Nutrition management.  Tube feeding via PEG placed 21.  Registered dietician to monitor tube feeding tolerance, weight and labs.    3.  Neurology following intermittently.      4.  Nephrology following for stage 3 chronic kidney disease.    5. PT/OT 3x/week.     6.  Palliative following.    7.  Psychiatry following.         BARRIERS  1.  Acute hypoxic respiratory failure due to subarachnoid hemorrhage. Trach wean.    2.  Traumatic brain injury/intracranial hemorrhage/acute ischemic stroke.    3.  Oropharyngeal dysphagia.    4.  Cognition impairment.    5.  Bed availability for placement.      Disposition:  Skilled nursing with Trach care.  Care Manager Name:  Jacqueline Vieyra RN,BSN, HNB-BC    Date/Time:  21 @ 9:32 AM

## 2021-09-20 NOTE — PLAN OF CARE
Problem: Adult Inpatient Plan of Care  Goal: Optimal Comfort and Wellbeing  Outcome: No Change   Up in the chair for almost 3 hours. No attempts to pull tubes. Vital signs within normal limits.

## 2021-09-20 NOTE — PROGRESS NOTES
"Social Work Note:     Writer called and spoke with spouse.  Informed spouse that patient has been screened and accepted at [facility].  Informed spouse that [facility] is the nearest facility, to her home in [place], that can accept \"new\" trach patient.    Spouse said she will need to think about it, but is worried it is too far from her home.  She feels right now that she does not want him to go to [facility] due to distance.       Dayron Chen, [facility] [place]  [phone]  "

## 2021-09-21 ENCOUNTER — APPOINTMENT (OUTPATIENT)
Dept: PHYSICAL THERAPY | Facility: CLINIC | Age: 71
DRG: 207 | End: 2021-09-21
Attending: HOSPITALIST
Payer: COMMERCIAL

## 2021-09-21 ENCOUNTER — APPOINTMENT (OUTPATIENT)
Dept: SPEECH THERAPY | Facility: CLINIC | Age: 71
DRG: 207 | End: 2021-09-21
Attending: HOSPITALIST
Payer: COMMERCIAL

## 2021-09-21 LAB — POTASSIUM BLD-SCNC: 3.6 MMOL/L (ref 3.5–5)

## 2021-09-21 PROCEDURE — 120N000001 HC R&B MED SURG/OB

## 2021-09-21 PROCEDURE — 250N000009 HC RX 250: Performed by: INTERNAL MEDICINE

## 2021-09-21 PROCEDURE — 92526 ORAL FUNCTION THERAPY: CPT | Mod: GN | Performed by: SPEECH-LANGUAGE PATHOLOGIST

## 2021-09-21 PROCEDURE — 99232 SBSQ HOSP IP/OBS MODERATE 35: CPT | Performed by: INTERNAL MEDICINE

## 2021-09-21 PROCEDURE — 250N000013 HC RX MED GY IP 250 OP 250 PS 637: Performed by: NURSE PRACTITIONER

## 2021-09-21 PROCEDURE — 250N000013 HC RX MED GY IP 250 OP 250 PS 637: Performed by: HOSPITALIST

## 2021-09-21 PROCEDURE — 250N000011 HC RX IP 250 OP 636

## 2021-09-21 PROCEDURE — 92507 TX SP LANG VOICE COMM INDIV: CPT | Mod: GN | Performed by: SPEECH-LANGUAGE PATHOLOGIST

## 2021-09-21 PROCEDURE — 97112 NEUROMUSCULAR REEDUCATION: CPT | Mod: GP | Performed by: PHYSICAL THERAPIST

## 2021-09-21 PROCEDURE — 999N000123 HC STATISTIC OXYGEN O2DAILY TECH TIME

## 2021-09-21 PROCEDURE — 36415 COLL VENOUS BLD VENIPUNCTURE: CPT | Performed by: INTERNAL MEDICINE

## 2021-09-21 PROCEDURE — 999N000157 HC STATISTIC RCP TIME EA 10 MIN

## 2021-09-21 PROCEDURE — 97530 THERAPEUTIC ACTIVITIES: CPT | Mod: GP | Performed by: PHYSICAL THERAPIST

## 2021-09-21 PROCEDURE — 94640 AIRWAY INHALATION TREATMENT: CPT

## 2021-09-21 PROCEDURE — 999N000009 HC STATISTIC AIRWAY CARE

## 2021-09-21 PROCEDURE — 250N000013 HC RX MED GY IP 250 OP 250 PS 637: Performed by: INTERNAL MEDICINE

## 2021-09-21 PROCEDURE — 94640 AIRWAY INHALATION TREATMENT: CPT | Mod: 76

## 2021-09-21 PROCEDURE — 250N000013 HC RX MED GY IP 250 OP 250 PS 637

## 2021-09-21 PROCEDURE — 84132 ASSAY OF SERUM POTASSIUM: CPT | Performed by: INTERNAL MEDICINE

## 2021-09-21 RX ADMIN — VENLAFAXINE 50 MG: 25 TABLET ORAL at 17:15

## 2021-09-21 RX ADMIN — METHYLPHENIDATE HYDROCHLORIDE 5 MG: 5 TABLET ORAL at 11:26

## 2021-09-21 RX ADMIN — VENLAFAXINE 25 MG: 25 TABLET ORAL at 05:47

## 2021-09-21 RX ADMIN — HYDROCORTISONE: 1 CREAM TOPICAL at 08:28

## 2021-09-21 RX ADMIN — FAMOTIDINE 20 MG: 20 TABLET, FILM COATED ORAL at 08:25

## 2021-09-21 RX ADMIN — METHYLPHENIDATE HYDROCHLORIDE 10 MG: 5 TABLET ORAL at 08:25

## 2021-09-21 RX ADMIN — MIRTAZAPINE 15 MG: 15 TABLET, FILM COATED ORAL at 21:50

## 2021-09-21 RX ADMIN — AMLODIPINE BESYLATE 10 MG: 10 TABLET ORAL at 08:25

## 2021-09-21 RX ADMIN — ACETYLCYSTEINE 2 ML: 200 SOLUTION ORAL; RESPIRATORY (INHALATION) at 19:41

## 2021-09-21 RX ADMIN — Medication 2 PACKET: at 21:50

## 2021-09-21 RX ADMIN — HYDROCORTISONE: 1 CREAM TOPICAL at 21:51

## 2021-09-21 RX ADMIN — ACETAMINOPHEN ORAL SOLUTION 650 MG: 650 SOLUTION ORAL at 11:52

## 2021-09-21 RX ADMIN — VENLAFAXINE 50 MG: 25 TABLET ORAL at 11:26

## 2021-09-21 RX ADMIN — Medication 2 PACKET: at 08:25

## 2021-09-21 RX ADMIN — HEPARIN SODIUM 5000 UNITS: 5000 INJECTION, SOLUTION INTRAVENOUS; SUBCUTANEOUS at 05:48

## 2021-09-21 RX ADMIN — ANORECTAL OINTMENT: 15.7; .44; 24; 20.6 OINTMENT TOPICAL at 21:50

## 2021-09-21 RX ADMIN — ACETYLCYSTEINE 2 ML: 200 SOLUTION ORAL; RESPIRATORY (INHALATION) at 08:44

## 2021-09-21 RX ADMIN — SCOPALAMINE 1 PATCH: 1 PATCH, EXTENDED RELEASE TRANSDERMAL at 14:43

## 2021-09-21 RX ADMIN — HEPARIN SODIUM 5000 UNITS: 5000 INJECTION, SOLUTION INTRAVENOUS; SUBCUTANEOUS at 14:37

## 2021-09-21 RX ADMIN — IPRATROPIUM BROMIDE AND ALBUTEROL SULFATE 3 ML: 2.5; .5 SOLUTION RESPIRATORY (INHALATION) at 08:44

## 2021-09-21 RX ADMIN — VENLAFAXINE 50 MG: 25 TABLET ORAL at 05:47

## 2021-09-21 RX ADMIN — IPRATROPIUM BROMIDE AND ALBUTEROL SULFATE 3 ML: 2.5; .5 SOLUTION RESPIRATORY (INHALATION) at 19:41

## 2021-09-21 RX ADMIN — FAMOTIDINE 20 MG: 20 TABLET, FILM COATED ORAL at 21:50

## 2021-09-21 RX ADMIN — ANORECTAL OINTMENT: 15.7; .44; 24; 20.6 OINTMENT TOPICAL at 14:38

## 2021-09-21 RX ADMIN — ANORECTAL OINTMENT: 15.7; .44; 24; 20.6 OINTMENT TOPICAL at 08:27

## 2021-09-21 RX ADMIN — HEPARIN SODIUM 5000 UNITS: 5000 INJECTION, SOLUTION INTRAVENOUS; SUBCUTANEOUS at 21:50

## 2021-09-21 RX ADMIN — Medication 2 PACKET: at 14:37

## 2021-09-21 ASSESSMENT — MIFFLIN-ST. JEOR: SCORE: 1533.5

## 2021-09-21 NOTE — PLAN OF CARE
Problem: Device-Related Complication Risk (Artificial Airway)  Goal: Optimal Device Function  Outcome: No Change     Problem: Skin and Tissue Injury (Artificial Airway)  Goal: Absence of Device-Related Skin or Tissue Injury  Outcome: No Change     Problem: Communication Impairment (Artificial Airway)  Goal: Effective Communication  Outcome: No Change     Respiratory Care Progress Note    Airway/oxygen:    Current settings: capped, room air  Trach type/size: #6 placed on 8/29/2021    Actions:    Medications/therapies: Duoneb BID, Mucomyst BID  Overnight suctioning   Frequency: 2x   Amount: large   Consistency: thick   Color: white   Spontaneous effort: good, strong    Weaning phase: 3  Daytime plan: capped, room air  Nighttime plan: capped, room air    Response    Breath sounds: diminished  Vital signs: Temp: 97.8  F (36.6  C) Temp src: Axillary BP: 130/83 Pulse: 89   Resp: 20 SpO2: 93 % O2 Device: None (Room air)    Risk for self decannulation: no    Note/Plan: continue plan of care

## 2021-09-21 NOTE — PLAN OF CARE
Patient c/o generalized pain this shift. He was medicated with tylenol with fair effect. Will continue to monitor.

## 2021-09-21 NOTE — PROGRESS NOTES
CLINICAL NUTRITION SERVICES - REASSESSMENT NOTE      Recommendations Ordered by Registered Dietitian (RD):   Continue current TF regimen as ordered   Malnutrition:  % Weight Loss:  None noted  % Intake:  No decreased intake noted  Subcutaneous Fat Loss:  Orbital region severe depletion and Upper arm region severe depletion  Muscle Loss:  Temporal region moderate depletion, Clavicle bone region severe depletion, Acromion bone region severe depletion, Dorsal hand region mild depletion, Anterior thigh region severe depletion and Posterior calf region moderate-severe depletion  Fluid Retention:  Moderate - per RN, scrotal edema noted. Weight has been trending up. Seems to have leveled off.    Malnutrition Diagnosis: Severe malnutrition  In Context of:  Acute illness or injury     EVALUATION OF PROGRESS TOWARD GOALS   Diet:  None    Nutrition Support:     Enteral Regimen: Novasource Renal at 65 mL/hr   Nutrisource Fiber: 2 packets TID   Total Enteral Provisions: 1560 mL daily provides 3210 kcal (46 kcal per kg), 142g protein (2g per kg), 285g CHO, 18g fiber and 1120mL free water. Meets 100% of DRI's.     Intake/Tolerance:  Pt. Tolerating TF regimen well    ASSESSED NUTRITION NEEDS:  Dosing Weight:  70.8 kg    NEW FINDINGS:   - last BM 9/20 - formed  - weight up 20# since 9/1, fluid-related - [facility] per MD  - Repeated video swallow 9/16/2021and showed silent aspiration  - Ca back WNL  - BG WNL    Previous Goals:   Tolerate TF  Maintain weight - minimize fluid retention  Evaluation: Not met - weight trending up    Previous Nutrition Diagnosis:   Impaired nutrient utilization related to CKD as evidenced by continued need for renal TF formula to help maintain mineral homeostasis.  Evaluation: Improving    CURRENT NUTRITION DIAGNOSIS  Swallowing Difficulty  related to dysphagia and silent aspiration as evidenced by need for continued TF to meet nutritional needs.    INTERVENTIONS  Recommendations / Nutrition  Prescription  Continue current TF regimen as ordered    Implementation  EN Composition, EN Schedule and Feeding Tube Flush    Goals  Maintain weight - minimize fluid retention via continuation of renal TF formula    MONITORING AND EVALUATION:  Progress towards goals will be monitored and evaluated per protocol and Practice Guidelines    Dulce Gonzales RDN, LD  Clinical Dietitian

## 2021-09-21 NOTE — TELEPHONE ENCOUNTER
Health Call Center     Phone Message     May a detailed message be left on voicemail: yes      Reason for Call: Other: Richard Coreas at [facility], Pt is in hospital and attending [facility] is requeting a video appt with Dr. Martinez.       Lyudmila is calling back still witing to get this scheduled.    Please call Lyudmila back at [phone] to schedule.     Action Taken: Message routed to:  [facility] Neurosurgery     Travel Screening: Not Applicable

## 2021-09-21 NOTE — PLAN OF CARE
Problem: Communication Impairment (Artificial Airway)  Goal: Effective Communication  Outcome: Improving     Problem: Device-Related Complication Risk (Artificial Airway)  Goal: Optimal Device Function  Outcome: Improving     Problem: Skin and Tissue Injury (Artificial Airway)  Goal: Absence of Device-Related Skin or Tissue Injury  Outcome: Improving   RT PROGRESS NOTE     DATA:     CURRENT SETTINGS:               TRACH TYPE / SIZE: #6 BIVONA TTS Changed on 8/29/21             MODE: Trach Capped              FIO2:  21%     ACTION:             THERAPIES: DuoNeb/ Mucomyst neb BID                SUCTION:                           FREQUENCY: X 2                        AMOUNT: moderate                        CONSISTENCY: thick                        COLOR:   White              SPONTANEOUS COUGH EFFORT/STRENGTH OF EFFORT (not elicited by suctioning): Fair                               WEANING PHASE: 3                        WEAN MODE: Trach Capped                         WEAN TIME:  Cont.                        END WEAN REASON:      RESPONSE:             BS :Clear Decreased             VITAL SIGNS: Sat 94-99%, HR 80-88, RR 20-22             EMOTIONAL NEEDS / CONCERNS:confused               RISK FOR SELF DECANNULATION: no                        RISK DUE TO: no                         INTERVENTION/S IN PLACE IS/ARE:      NOTE / PLAN:  Cont. Current plan of care

## 2021-09-21 NOTE — PLAN OF CARE
Problem: Anxiety  Goal: Anxiety Reduction or Resolution  Outcome: No Change  Intervention: Promote Anxiety Reduction  Recent Flowsheet Documentation  Taken 9/20/2021 2215 by Laverne Biswas RN  Family/Support System Care: involvement promoted     Problem: Skin and Tissue Injury (Artificial Airway)  Goal: Absence of Device-Related Skin or Tissue Injury  Outcome: No Change     Problem: Pain Chronic (Persistent)  Goal: Acceptable Pain Control and Functional Ability  Outcome: No Change  Intervention: Optimize Psychosocial Wellbeing  Recent Flowsheet Documentation  Taken 9/20/2021 2215 by Laverne Biswas RN  Diversional Activities: television  Family/Support System Care: involvement promoted  Patient's vital signs were sable. Denied pain or discomfort. Patient was calm and cooperative with cares. Patient repositioned every two hours. All due cares and medications given per MD order.

## 2021-09-21 NOTE — PROGRESS NOTES
[facility]    Medicine Progress Note - Hospitalist Service       Date of Admission:  6/11/2021    Summary:  Dayron Neri is a 70 year male with h/o HTN who presented to ED on 5/15/2021 with acute SAH after a fall with unresponsiveness.  He underwent emergent left external ventricular drain placement.  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  On 5/28/2021, patient had persistent cerebral vasospasm, CT head showed bilateral BAYLEE infarct and was treated with milrinone and verapamil.  On 6/1/2021, angiogram showed no evidence of vasospasm.  Extubated on 6/3/2021.  6/6/2021 he was reintubated due to hypoxia.  On 6/7/2021, patient underwent tracheostomy and PEG tube placement.  EVD removed 6/9/2021.   Patient was treated for possible pneumonia with Unasyn on 5/20/2021, It was switched to ceftriaxone the next day for sputum culture growing Klebsiella. On 5/23, sputum culture also grew Pseudomonas. Antibiotic was switched to Zosyn.  Due to increased WBCs in CSF, possibility of ventriculitis was raised. CSF culture was negative. Antibiotic was switched to cefepime and vancomycin on 5/28/2021 for 2 weeks.  Vanco was discontinued on 6/9/2021. On 6/5/2021, sputum grew ESBL so cefepime was switched to meropenem. He was transferred to [facility] on 6/11/21.     Since admission to [facility], patient did not make significant progression for his neurology status. Trach weaning is also lack of progression due to his cognitive impairment.       Assessment & Plan           Acute on chronic hypoxemic respiratory failure: S/p treatment pseudomonas ESBL and Klebsiella pneumonia. Has excessive secretions. Weaning has been slow. Per pulmonary, his cognition prevents him from improving further. His pulmonary status could improve more if his cognition improves.   - Has been capped on room air for day and night since 8/31. Per pulmonary, no plan for decannulation due to  "excessive secretions.   - Continue to wean per RT and pulmonary.   - Airway secretion: Glycopyrrolate was tried and stopped. 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 CT head done on 7/1/21 shows resolution of previous multicompartment hemorrhage in the right frontal lobe and the ventricles. CT head repeated on 9/8, shows unchanged encephalomalacia in the right frontal, right temporal, and left paramedian parietal lobes.  - Need follow up with Neurosurgery Dr. Martinez in 3 months post op: plan to arrange a video appointment.      Acute metabolic encephalopathy/cognitive impairment: His current neurology status is likely his new baseline due to frontal lobe injury.  Wife is still hopeful he will improve further but it is unclear if he has the capacity to focus and learn what they are attempting to teach him during therapy.  - Nuvigil started on 8/27 per neurology recommendation, but wife does not think it is helpful. Restarted Ritalin 9/12/21.   - Continue Effexor. Increased on 9/11/21 to 175 mg daily    Dysphagia: On tube feeds and NPO.   - Speech therapy following. Per note from 8/17/21, despite several weeks of working with patient there has been \"no functional improvement in patient's ability to manage his oropharyngeal secretions.\" Speech discussed this with wife. Intensive dysphagia treatment has been discontinued. Repeated video swallow 9/16/2021and showed silent aspiration.    Severe malnutrition: has moderate/severe subcutaneous fat loss, moderate/severe muscle loss  - continuous tube feeding per dietician     Chronic kidney disease (CKD) stage 3a: Creatinine has been stable.    Anemia: hemoglobin stable. Continue to monitor.    Essential hypertension: BP controlled. 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 Sputum culture 6/19 showed MDR pseudomonas. On Tobramycin nebs 6/24/2021 - 7/9/2021.   - Continue pulmonary toilet, scheduled duonebs    Ventilator associated pneumonia: Completed antibiotics on July 18.    Severe debility, weakness, critical illness, deconditioning: Continue PT/OT    Hypercalcemia: unclear etiology, mild at this point. Parathyroid hormone normal. May be related to relative immobilization. Continue to monitor.    Insomnia: Continue nighttime mirtazapine 7.5 mg scheduled.    Left inguinal hernia: US on 6/22/21 shows large left inguinal hernia containing mesenteric fat and bowel, extending to the superior scrotum, no evidence of intratesticular mass. Currently has no acute issue and no intervention indicated.       Diet: Adult Formula Drip Feeding: Continuous Novasource Renal; Gastrostomy; Goal Rate: 65; mL/hr; Medication - Feeding Tube Flush Frequency: At least 15-30 mL water before and after medication administration and with tube clogging; Amount to Send (Nutri...    DVT Prophylaxis: Heparin SQ  Gutierrez Catheter: Not present  Central Lines: None  Code Status: Full Code      Disposition Plan   Expected discharge: placement to [facility] facility pending. Patient is accepted to [facility]. However, wife declined the placement.    The patient's care was discussed with the Bedside Nurse and Care Coordinator/.    Yazmin Hooper MD  Hospitalist Service  [facility]  Securely message with the Vocera Web Console (learn more here)  Text page via [employer] Paging/Directory      ______________________________________________________________________    Interval History   Patient was sitting up in the chair this morning when seen and examined. His wife and daughter were watching him over the iPAD. Despite that his wife and daughter kept talking to him, patient only looked at the iPAD  screen intermittently and did not interact with his family. His wife again asked what the chance is for him to recover. She states that each person is different and she is still hopeful that he can recover. Wife reports that patient has hallucination. Explained to wife that given his cognitive impairment and confusion, it is not clear how reliable his hallucination is.     Physical Exam   Vital Signs: Temp: 97.6  F (36.4  C) Temp src: Oral BP: (!) 120/94 Pulse: 84   Resp: 20 SpO2: 96 % O2 Device: None (Room air)    Weight: 163 lbs 8 oz    General appearance: not in acute distress  HEENT: PERRL, EOMI. Trach in place.  Lungs: Clear breath sounds in bilateral lung fields  Cardiovascular: Regular rate and rhythm, normal S1-S2  Abdomen: Soft, non tender, no distension. Left scrotal hernia, no pain on palpation  Musculoskeletal: No joint swelling  Skin: No rash and no edema  Neurology: Awake and alert. Mumble speech and hard to understand. Moves all four extremities.      Data   Recent Labs   Lab 09/21/21  0625 09/20/21  0701 09/19/21  0705   WBC  --  7.9  --    HGB  --  10.6*  --    MCV  --  96  --    PLT  --  351  --    NA  --  141  --    POTASSIUM 3.6 3.5 3.7   CHLORIDE  --  103  --    CO2  --  27  --    BUN  --  32*  --    CR  --  0.66*  --    ANIONGAP  --  11  --    RAGINI  --  10.5  --    GLC  --  124  --        Restraint:    Patient pulls his IV line and trach.     Within an hour after restraint an in person face to face assessment was completed, including an evaluation of the patient's immediate reaction to the intervention, behavioral assessment and review/assessment of history, drugs and medications, recent labs, etc., and behavioral condition.  The patient experienced: No adverse physical outcome from seclusion/restraint initiation.  The intervention of restraint or seclusion needs to continue.

## 2021-09-21 NOTE — PROGRESS NOTES
Social Work Note:    Writer called and spouse.  Spouse is aware [facility] patient for SNF/TCU, however, she is firmly and forcely declining that patient go there.  She is also firmly/strongly declining a placement @ [facility].  Writer informed spouse that there is no guarantee that another facility/SNF will accept patient or have a bed; however, she is still firmly/strongly declining a transfer to [place] [place].  Spouse fully aware that patient in-patient hospital insurance coverage ends on 10/11/21.      Dayron Chen [facility]  [phone]          [facility]  [facility]  [phone]

## 2021-09-22 ENCOUNTER — APPOINTMENT (OUTPATIENT)
Dept: PHYSICAL THERAPY | Facility: CLINIC | Age: 71
DRG: 207 | End: 2021-09-22
Attending: HOSPITALIST
Payer: COMMERCIAL

## 2021-09-22 ENCOUNTER — APPOINTMENT (OUTPATIENT)
Dept: OCCUPATIONAL THERAPY | Facility: CLINIC | Age: 71
DRG: 207 | End: 2021-09-22
Attending: HOSPITALIST
Payer: COMMERCIAL

## 2021-09-22 LAB
POTASSIUM BLD-SCNC: 3.4 MMOL/L (ref 3.5–5)
POTASSIUM BLD-SCNC: 3.8 MMOL/L (ref 3.5–5)

## 2021-09-22 PROCEDURE — 94640 AIRWAY INHALATION TREATMENT: CPT | Mod: 76

## 2021-09-22 PROCEDURE — 120N000001 HC R&B MED SURG/OB

## 2021-09-22 PROCEDURE — 250N000011 HC RX IP 250 OP 636

## 2021-09-22 PROCEDURE — 84132 ASSAY OF SERUM POTASSIUM: CPT | Performed by: INTERNAL MEDICINE

## 2021-09-22 PROCEDURE — 250N000009 HC RX 250: Performed by: INTERNAL MEDICINE

## 2021-09-22 PROCEDURE — 97530 THERAPEUTIC ACTIVITIES: CPT | Mod: GO | Performed by: OCCUPATIONAL THERAPIST

## 2021-09-22 PROCEDURE — 97530 THERAPEUTIC ACTIVITIES: CPT | Mod: GP | Performed by: PHYSICAL THERAPIST

## 2021-09-22 PROCEDURE — 250N000013 HC RX MED GY IP 250 OP 250 PS 637

## 2021-09-22 PROCEDURE — 97112 NEUROMUSCULAR REEDUCATION: CPT | Mod: GP | Performed by: PHYSICAL THERAPIST

## 2021-09-22 PROCEDURE — 999N000009 HC STATISTIC AIRWAY CARE

## 2021-09-22 PROCEDURE — 36415 COLL VENOUS BLD VENIPUNCTURE: CPT | Performed by: INTERNAL MEDICINE

## 2021-09-22 PROCEDURE — 250N000013 HC RX MED GY IP 250 OP 250 PS 637: Performed by: INTERNAL MEDICINE

## 2021-09-22 PROCEDURE — 250N000013 HC RX MED GY IP 250 OP 250 PS 637: Performed by: NURSE PRACTITIONER

## 2021-09-22 PROCEDURE — 99232 SBSQ HOSP IP/OBS MODERATE 35: CPT | Performed by: INTERNAL MEDICINE

## 2021-09-22 PROCEDURE — 999N000157 HC STATISTIC RCP TIME EA 10 MIN

## 2021-09-22 PROCEDURE — 99232 SBSQ HOSP IP/OBS MODERATE 35: CPT | Performed by: NURSE PRACTITIONER

## 2021-09-22 PROCEDURE — 250N000013 HC RX MED GY IP 250 OP 250 PS 637: Performed by: HOSPITALIST

## 2021-09-22 PROCEDURE — 94640 AIRWAY INHALATION TREATMENT: CPT

## 2021-09-22 RX ORDER — POTASSIUM CHLORIDE 1.5 G/1.58G
40 POWDER, FOR SOLUTION ORAL ONCE
Status: COMPLETED | OUTPATIENT
Start: 2021-09-22 | End: 2021-09-22

## 2021-09-22 RX ADMIN — VENLAFAXINE 50 MG: 25 TABLET ORAL at 16:16

## 2021-09-22 RX ADMIN — METHYLPHENIDATE HYDROCHLORIDE 10 MG: 5 TABLET ORAL at 08:48

## 2021-09-22 RX ADMIN — HYDROCORTISONE: 1 CREAM TOPICAL at 08:50

## 2021-09-22 RX ADMIN — HYDROCORTISONE: 1 CREAM TOPICAL at 20:17

## 2021-09-22 RX ADMIN — HEPARIN SODIUM 5000 UNITS: 5000 INJECTION, SOLUTION INTRAVENOUS; SUBCUTANEOUS at 22:07

## 2021-09-22 RX ADMIN — ACETYLCYSTEINE 2 ML: 200 SOLUTION ORAL; RESPIRATORY (INHALATION) at 19:42

## 2021-09-22 RX ADMIN — AMLODIPINE BESYLATE 10 MG: 10 TABLET ORAL at 08:48

## 2021-09-22 RX ADMIN — FAMOTIDINE 20 MG: 20 TABLET, FILM COATED ORAL at 20:16

## 2021-09-22 RX ADMIN — Medication 2 PACKET: at 20:16

## 2021-09-22 RX ADMIN — POTASSIUM CHLORIDE 40 MEQ: 1.5 POWDER, FOR SOLUTION ORAL at 08:48

## 2021-09-22 RX ADMIN — HEPARIN SODIUM 5000 UNITS: 5000 INJECTION, SOLUTION INTRAVENOUS; SUBCUTANEOUS at 05:55

## 2021-09-22 RX ADMIN — Medication 2 PACKET: at 14:16

## 2021-09-22 RX ADMIN — HEPARIN SODIUM 5000 UNITS: 5000 INJECTION, SOLUTION INTRAVENOUS; SUBCUTANEOUS at 14:15

## 2021-09-22 RX ADMIN — FAMOTIDINE 20 MG: 20 TABLET, FILM COATED ORAL at 08:48

## 2021-09-22 RX ADMIN — VENLAFAXINE 25 MG: 25 TABLET ORAL at 05:55

## 2021-09-22 RX ADMIN — METHYLPHENIDATE HYDROCHLORIDE 5 MG: 5 TABLET ORAL at 11:54

## 2021-09-22 RX ADMIN — ANORECTAL OINTMENT: 15.7; .44; 24; 20.6 OINTMENT TOPICAL at 08:49

## 2021-09-22 RX ADMIN — VENLAFAXINE 50 MG: 25 TABLET ORAL at 11:55

## 2021-09-22 RX ADMIN — VENLAFAXINE 50 MG: 25 TABLET ORAL at 05:56

## 2021-09-22 RX ADMIN — MIRTAZAPINE 15 MG: 15 TABLET, FILM COATED ORAL at 20:16

## 2021-09-22 RX ADMIN — Medication 2 PACKET: at 08:48

## 2021-09-22 RX ADMIN — IPRATROPIUM BROMIDE AND ALBUTEROL SULFATE 3 ML: 2.5; .5 SOLUTION RESPIRATORY (INHALATION) at 08:05

## 2021-09-22 RX ADMIN — ACETYLCYSTEINE 2 ML: 200 SOLUTION ORAL; RESPIRATORY (INHALATION) at 08:06

## 2021-09-22 RX ADMIN — IPRATROPIUM BROMIDE AND ALBUTEROL SULFATE 3 ML: 2.5; .5 SOLUTION RESPIRATORY (INHALATION) at 19:40

## 2021-09-22 RX ADMIN — ANORECTAL OINTMENT: 15.7; .44; 24; 20.6 OINTMENT TOPICAL at 14:16

## 2021-09-22 RX ADMIN — ANORECTAL OINTMENT: 15.7; .44; 24; 20.6 OINTMENT TOPICAL at 20:16

## 2021-09-22 ASSESSMENT — MIFFLIN-ST. JEOR: SCORE: 1543.48

## 2021-09-22 NOTE — PROGRESS NOTES
Social Work Note:     Writer called and spoke with spouse yesterday.  [facility], however, she is firmly and forcely declining that patient go there.  She is also firmly/strongly declining a placement @ [facility].  Writer informed spouse that there is no guarantee that another facility/SNF will accept patient or have a bed; however, she is still firmly/strongly declining a transfer to [facility] [place].  Spouse fully aware that patient in-patient hospital insurance coverage ends on 10/11/21.   Follow up with [facility] and [facility] today.  They do not have have any trach beds open, and have staffing issues.      Dayron Chen, [facility]  [facility]  [phone]

## 2021-09-22 NOTE — PLAN OF CARE
Problem: Device-Related Complication Risk (Artificial Airway)  Goal: Optimal Device Function  Outcome: No Change     Problem: Skin and Tissue Injury (Artificial Airway)  Goal: Absence of Device-Related Skin or Tissue Injury  Outcome: No Change     Problem: Communication Impairment (Artificial Airway)  Goal: Effective Communication  Outcome: No Change     Respiratory Care Progress Note    Airway/oxygen:    Current settings: capped, room air  Trach type/size: #6 placed on 8/29/2021    Actions:    Medications/therapies: Duoneb BID, Mucomyst BID  Overnight suctioning   Frequency: 2x   Amount: large   Consistency: thick   Color: white   Spontaneous effort: good, strong    Weaning phase: 3  Daytime plan: capped, room air  Nighttime plan: capped, room air    Response    Breath sounds: diminished  Vital signs: Temp: 97.7  F (36.5  C) Temp src: Oral BP: (!) 142/86 Pulse: 81   Resp: 20 SpO2: 95 % O2 Device: None (Room air)    Risk for self decannulation: no    Note/Plan: continue plan of care, no events overnight

## 2021-09-22 NOTE — PLAN OF CARE
Problem: Adult Inpatient Plan of Care  Goal: Absence of Hospital-Acquired Illness or Injury  Outcome: Improving  Intervention: Identify and Manage Fall Risk  Recent Flowsheet Documentation  Taken 9/21/2021 1729 by Lakshmi Esqueda RN  Safety Promotion/Fall Prevention:   bed alarm on   assistive device/personal items within reach  Intervention: Prevent and Manage VTE (Venous Thromboembolism) Risk  Recent Flowsheet Documentation  Taken 9/21/2021 1729 by Lakshmi Esqueda RN  VTE Prevention/Management: anticoagulant therapy maintained  Intervention: Prevent Infection  Recent Flowsheet Documentation  Taken 9/21/2021 1729 by Lakshmi Esqueda RN  Infection Prevention:   hand hygiene promoted   personal protective equipment utilized   rest/sleep promoted   single patient room provided     Problem: Adult Inpatient Plan of Care  Goal: Absence of Hospital-Acquired Illness or Injury  Intervention: Identify and Manage Fall Risk  Recent Flowsheet Documentation  Taken 9/21/2021 1729 by Lakshmi Esqueda RN  Safety Promotion/Fall Prevention:   bed alarm on   assistive device/personal items within reach     Problem: Adult Inpatient Plan of Care  Goal: Optimal Comfort and Wellbeing  Outcome: Improving  Intervention: Provide Person-Centered Care  Recent Flowsheet Documentation  Taken 9/21/2021 1729 by Lakshmi Esqueda RN  Trust Relationship/Rapport:   care explained   emotional support provided   reassurance provided   questions answered     Problem: Device-Related Complication Risk (Artificial Airway)  Goal: Optimal Device Function  Outcome: Improving  Intervention: Optimize Device Care and Function  Recent Flowsheet Documentation  Taken 9/21/2021 1729 by Lakshmi Esqueda RN  Airway Safety Measures: all equipment/monitors on and audible     Patient had been on continuous trache capping since 8/31/2021. His copious secretions per trachea make it risky for him to be decannulated. His systolic BPs ranged  between 131 - 142 mm Hg this evening. PRN Hydralazine  keep his BP on check  per parameter. He denied pains throughout this evening. He remained forgetful  and disoriented to time, place and situation. Will keep monitoring patient's progress and safety.

## 2021-09-22 NOTE — PLAN OF CARE
Problem: Adult Inpatient Plan of Care  Goal: Plan of Care Review  Outcome: Improving   Patient  continued on trach capped ,appears comfortable throughout the shift, denied pain and was repositioned every 2 hours, All scheduled medication given, No sign of distress noted.

## 2021-09-22 NOTE — PLAN OF CARE
Pt's Potassium was 3.4 with the Am draw, 40 mEq potassium was given. Recheck at 1245 shown potassium 3.8.  Will be rechecked tomorrow morning.   Was up in chair x2. Pt. Denied pain and did not require suction throughout shift. Output was 575. He had small BM.       Problem: Anxiety  Goal: Anxiety Reduction or Resolution  Outcome: Improving  Intervention: Promote Anxiety Reduction  Recent Flowsheet Documentation  Taken 9/22/2021 0900 by Li Tony, RN  Family/Support System Care: involvement promoted     Problem: Pain Chronic (Persistent)  Goal: Acceptable Pain Control and Functional Ability  Outcome: Improving  Intervention: Optimize Psychosocial Wellbeing  Recent Flowsheet Documentation  Taken 9/22/2021 0900 by Li Tony, RN  Diversional Activities: television  Family/Support System Care: involvement promoted     Problem: Electrolyte Imbalance  Goal: Electrolyte Balance  Outcome: Declining

## 2021-09-22 NOTE — PROGRESS NOTES
"Pulmonary Medicine Follow up Note :    Clinical status discussed today with  respiratory therapist.    Chief complaint: Ongoing respiratory failure  Significant change in clinical status during past 24 hours? No      FiO2 (%): 21 %  Resp: 16    Tracheal secretions: overnight: x2 large thick secretions.   Strong cough.     Current phase of ventilator weaning pathway:  Phase 3 since 8/31  Capped on room air.       Physical exam     /83 (BP Location: Left arm)   Pulse 81   Temp 97.9  F (36.6  C) (Oral)   Resp 16   Ht 1.803 m (5' 10.98\")   Wt 75.2 kg (165 lb 11.2 oz)   SpO2 96%   BMI 23.12 kg/m       Gen: lying in bed, no distress.   HEENT: AT/NC. Trach in place. Capped - whisper voice.   Lungs: diminished ant otherwise clear  CV: regular, no murmurs or gallops appreciated  Abdomen: soft, NT, BS wnl  Ext: no edema  Neuro: awake, tracking intermittently  Leans to his Right.          Diagnosis:     Patient is a 69 yo man with history of hypertension. Patient initially presented on 15-May-2021 to [facility] after being found unresponsive by his wife. Patient was then found to have a subarachnoid hemorrhage secondary to a ruptured aneurysm. Patient was intubated and was started on mannitol, IV antihypertensives and hyperventilation and patient was then transferred to [facility]. Patient underwent aneurysm clipping as well as placement of extra-ventricular drain from hydrocephalus on 15-May-2021. Extra-ventricular drain would malfunction and would require replacement on 04-Jun-2021 and 06-Jun-2021. Extra-ventricular drain reportedly was removed on 09-Jun-2021. Patient had intra-arterial vasospasm treatment with verapamil on 28-May-2021.  Patient was extubated on 16-May-2021 but had to be reintubated on 19-May-2021. Patient would be extubated on 03-Jun-2021 but would be reintubated on 06-Jun-2021. Patient had tracheostomy and PEG-tube placed on 07-Jun-2021. Patient required " "treatment for hospital-acquired pneumonia. Patient was transferred to [facility] on 11-Jun-2021.    1. Acute respiratory failure s/p trach 6/7/2021  2. S/p treatment pseudomonas ESBL and klebsiella pneumonia   3. Encephalopathy   4. Subarachnoid bleed s/p craniotomy and clipping P-comm rupture aneurysm.   5. HTN  6. Malnourishment   7. Dysphagia s/p G tube    Recommendations/Plans (MDM):  1. Weaning orders: phase 3. Patient is a high aspiration risk. His video swallow from 9/16 mentions no spontaneous cough response at or below the vocal chords resulting in silent aspiration. He is at risk for aspiration and/or airway obstruction due to volume of vallecular stasis. Per ST: \"Patient's swallow is both inefficient and unsafe at present\" At this time, trach is considered long term/permanent unless his swallow improves significantly.   2. Trach change? Routine change monthly.   3. Diagnostic testing? Per [facility]  4. Pulmonary toilet with mucomyst, duoneb. Of note, patient is on scopolamine patch.        Ambar Pimentel, CNP  [facility] Pulmonary/Critical Care           "

## 2021-09-22 NOTE — PROGRESS NOTES
[facility]    Medicine Progress Note - Hospitalist Service       Date of Admission:  6/11/2021    Summary:  Dayron Neri is a 70 year male with h/o HTN who presented to ED on 5/15/2021 with acute SAH after a fall with unresponsiveness.  He underwent emergent left external ventricular drain placement.  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  On 5/28/2021, patient had persistent cerebral vasospasm, CT head showed bilateral BAYLEE infarct and was treated with milrinone and verapamil.  On 6/1/2021, angiogram showed no evidence of vasospasm.  Extubated on 6/3/2021.  6/6/2021 he was reintubated due to hypoxia.  On 6/7/2021, patient underwent tracheostomy and PEG tube placement.  EVD removed 6/9/2021.   Patient was treated for possible pneumonia with Unasyn on 5/20/2021, It was switched to ceftriaxone the next day for sputum culture growing Klebsiella. On 5/23, sputum culture also grew Pseudomonas. Antibiotic was switched to Zosyn.  Due to increased WBCs in CSF, possibility of ventriculitis was raised. CSF culture was negative. Antibiotic was switched to cefepime and vancomycin on 5/28/2021 for 2 weeks.  Vanco was discontinued on 6/9/2021. On 6/5/2021, sputum grew ESBL so cefepime was switched to meropenem. He was transferred to [facility] on 6/11/21.     Since admission to [facility], patient did not make significant progression for his neurology status. Trach weaning is also lack of progression due to his cognitive impairment.       Assessment & Plan           Acute on chronic hypoxemic respiratory failure: S/p treatment pseudomonas ESBL and Klebsiella pneumonia. Has excessive secretions. Weaning has been slow. Per pulmonary, his cognition prevents him from improving further. His pulmonary status could improve more if his cognition improves.   - Has been capped on room air for day and night since 8/31. Per pulmonary, no plan for decannulation due to  "high risk of aspiration and airway obstruction. Video swallow from 9/16 suggests no spontaneous cough response at or below the vocal chords resulting in silent aspiration. The trach is considered long term/permanent unless his swallow improves significantly.   - Continue trach care per RT and pulmonary.   - Airway secretion: Glycopyrrolate was tried and stopped. 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 CT head done on 7/1/21 shows resolution of previous multicompartment hemorrhage in the right frontal lobe and the ventricles. CT head repeated on 9/8, shows unchanged encephalomalacia in the right frontal, right temporal, and left paramedian parietal lobes.  - Need follow up with Neurosurgery Dr. Martinez in 3 months post op: A video visit was scheduled on 11/3. They will fax imaging requirements prior to the appointment.      Acute metabolic encephalopathy/cognitive impairment: His current neurology status is likely his new baseline due to frontal lobe injury.  Wife is still hopeful he will improve further but it is unclear if he has the capacity to focus and learn what they are attempting to teach him during therapy.  - Nuvigil started on 8/27 per neurology recommendation, but wife does not think it is helpful. Restarted Ritalin 9/12/21.   - Continue Effexor. Increased on 9/11/21 to 175 mg daily    Dysphagia: On tube feeds and NPO.   - Speech therapy following. Per note from 8/17/21, despite several weeks of working with patient there has been \"no functional improvement in patient's ability to manage his oropharyngeal secretions.\" Speech discussed this with wife. Intensive dysphagia treatment has been discontinued. Repeated video swallow 9/16/2021and showed silent " aspiration.    Severe malnutrition: has moderate/severe subcutaneous fat loss, moderate/severe muscle loss  - continuous tube feeding per dietician     Chronic kidney disease (CKD) stage 3a: Creatinine has been stable.    Anemia: hemoglobin stable. Continue to monitor.    Essential hypertension: BP controlled. 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 Sputum culture 6/19 showed MDR pseudomonas. On Tobramycin nebs 6/24/2021 - 7/9/2021.   - Continue pulmonary toilet, scheduled duonebs    Ventilator associated pneumonia: Completed antibiotics on July 18.    Severe debility, weakness, critical illness, deconditioning: Continue PT/OT    Hypercalcemia: unclear etiology, mild at this point. Parathyroid hormone normal. May be related to relative immobilization. Continue to monitor.    Insomnia: Continue nighttime mirtazapine 7.5 mg scheduled.    Left inguinal hernia: US on 6/22/21 shows large left inguinal hernia containing mesenteric fat and bowel, extending to the superior scrotum, no evidence of intratesticular mass. Currently has no acute issue and no intervention indicated.       Diet: Adult Formula Drip Feeding: Continuous Novasource Renal; Gastrostomy; Goal Rate: 65; mL/hr; Medication - Feeding Tube Flush Frequency: At least 15-30 mL water before and after medication administration and with tube clogging; Amount to Send (Nutri...    DVT Prophylaxis: Heparin SQ  Gutierrez Catheter: Not present  Central Lines: None  Code Status: Full Code      Disposition Plan   Expected discharge: placement to trach facility pending. Patient is accepted to [facility]. However, wife declined the placement.    The patient's care was discussed with the Bedside Nurse and Care Coordinator/.    Yazmin Hooper MD  Hospitalist Service  [facility]  Securely message with the Vocera Web Console (learn more here)  Text page via [employer]  [place]/Directory      ______________________________________________________________________    Interval History   Patient was seen and examined this morning during his physical therapy. Patient is not able to consistently initiate effort to do activities. He needs a lot of assistance. He is not able to initiate a meaningful conversation.      Physical Exam   Vital Signs: Temp: 97.9  F (36.6  C) Temp src: Oral BP: 136/83 Pulse: 83   Resp: 16 SpO2: 95 % O2 Device: None (Room air)    Weight: 165 lbs 11.2 oz    General appearance: not in acute distress  HEENT: PERRL, EOMI. Trach in place.  Lungs: Clear breath sounds in bilateral lung fields  Cardiovascular: Regular rate and rhythm, normal S1-S2  Abdomen: Soft, non tender, no distension. Left scrotal hernia, no pain on palpation  Musculoskeletal: No joint swelling  Skin: No rash and no edema  Neurology: Awake and alert. Mumble speech and hard to understand. Moves all four extremities.      Data   Recent Labs   Lab 09/22/21  1259 09/22/21  0645 09/21/21  0625 09/20/21  0701 09/20/21  0701   WBC  --   --   --   --  7.9   HGB  --   --   --   --  10.6*   MCV  --   --   --   --  96   PLT  --   --   --   --  351   NA  --   --   --   --  141   POTASSIUM 3.8 3.4* 3.6   < > 3.5   CHLORIDE  --   --   --   --  103   CO2  --   --   --   --  27   BUN  --   --   --   --  32*   CR  --   --   --   --  0.66*   ANIONGAP  --   --   --   --  11   RAGINI  --   --   --   --  10.5   GLC  --   --   --   --  124    < > = values in this interval not displayed.       Restraint:    Patient pulls his IV line and trach.     Within an hour after restraint an in person face to face assessment was completed, including an evaluation of the patient's immediate reaction to the intervention, behavioral assessment and review/assessment of history, drugs and medications, recent labs, etc., and behavioral condition.  The patient experienced: No adverse physical outcome from seclusion/restraint initiation.  The  intervention of restraint or seclusion needs to continue.

## 2021-09-22 NOTE — PLAN OF CARE
Problem: Device-Related Complication Risk (Artificial Airway)  Goal: Optimal Device Function  Outcome: Improving     Problem: Skin and Tissue Injury (Artificial Airway)  Goal: Absence of Device-Related Skin or Tissue Injury  Outcome: Improving     Problem: Communication Impairment (Artificial Airway)  Goal: Effective Communication  Outcome: Improving     RT PROGRESS NOTE     DATA:     CURRENT SETTINGS:             TRACH TYPE / SIZE:  #6 Bivona changed 8/29/21             MODE:   TRACH CAPPED             FIO2:   21%     ACTION:             THERAPIES:  Duoneb bid, mucomyst bid             SUCTION:                           FREQUENCY:   x1                        AMOUNT:   scan                        CONSISTENCY:   thin                        COLOR:   white             SPONTANEOUS COUGH EFFORT/STRENGTH OF EFFORT (not elicited by suctioning): none observed                              WEANING PHASE:   3                        WEAN MODE:    Trach capped 21%                        WEAN TIME:   cont                        END WEAN REASON:        RESPONSE:             BS:   Clear and diminished             VITAL SIGNS:   SPO2 93-96%, HR 89, RR 16-20             EMOTIONAL NEEDS / CONCERNS:  Confused                RISK FOR SELF DECANNULATION:  NO                        RISK DUE TO:                          INTERVENTION/S IN PLACE IS/ARE:         NOTE / PLAN:     Pt to cont with current poc.

## 2021-09-22 NOTE — PLAN OF CARE
Problem: Communication Impairment (Artificial Airway)  Goal: Effective Communication  Outcome: Improving     Problem: Device-Related Complication Risk (Artificial Airway)  Goal: Optimal Device Function  Outcome: Improving     Problem: Skin and Tissue Injury (Artificial Airway)  Goal: Absence of Device-Related Skin or Tissue Injury  Outcome: Improving   RT PROGRESS NOTE     DATA:     CURRENT SETTINGS:               TRACH TYPE / SIZE: #6 BIVONA TTS Changed on 8/29/21             MODE: Trach Capped              FIO2:  21%     ACTION:             THERAPIES: DuoNeb/ Mucomyst neb BID                SUCTION:                           FREQUENCY: X 2                        AMOUNT: moderate                        CONSISTENCY: thick                        COLOR:   White              SPONTANEOUS COUGH EFFORT/STRENGTH OF EFFORT (not elicited by suctioning): Fair                               WEANING PHASE: 3                        WEAN MODE: Trach Capped                         WEAN TIME:  Cont.                        END WEAN REASON:      RESPONSE:             BS :Clear Decreased             VITAL SIGNS: Sat 94-99%, HR 80-88, RR 20-22             EMOTIONAL NEEDS / CONCERNS:confused               RISK FOR SELF DECANNULATION: no                        RISK DUE TO: no                         INTERVENTION/S IN PLACE IS/ARE:      NOTE / PLAN:  Cont. Current plan of care. No plans to decannulate him today due to aspiration problem

## 2021-09-23 ENCOUNTER — APPOINTMENT (OUTPATIENT)
Dept: SPEECH THERAPY | Facility: CLINIC | Age: 71
DRG: 207 | End: 2021-09-23
Attending: HOSPITALIST
Payer: COMMERCIAL

## 2021-09-23 LAB — POTASSIUM BLD-SCNC: 3.5 MMOL/L (ref 3.5–5)

## 2021-09-23 PROCEDURE — 94640 AIRWAY INHALATION TREATMENT: CPT | Mod: 76

## 2021-09-23 PROCEDURE — 250N000013 HC RX MED GY IP 250 OP 250 PS 637: Performed by: INTERNAL MEDICINE

## 2021-09-23 PROCEDURE — 94640 AIRWAY INHALATION TREATMENT: CPT

## 2021-09-23 PROCEDURE — 84132 ASSAY OF SERUM POTASSIUM: CPT | Performed by: INTERNAL MEDICINE

## 2021-09-23 PROCEDURE — 999N000197 HC STATISTIC WOC PT EDUCATION, 0-15 MIN

## 2021-09-23 PROCEDURE — 999N000157 HC STATISTIC RCP TIME EA 10 MIN

## 2021-09-23 PROCEDURE — 250N000013 HC RX MED GY IP 250 OP 250 PS 637: Performed by: HOSPITALIST

## 2021-09-23 PROCEDURE — 250N000011 HC RX IP 250 OP 636

## 2021-09-23 PROCEDURE — 250N000013 HC RX MED GY IP 250 OP 250 PS 637: Performed by: NURSE PRACTITIONER

## 2021-09-23 PROCEDURE — 999N000123 HC STATISTIC OXYGEN O2DAILY TECH TIME

## 2021-09-23 PROCEDURE — 250N000013 HC RX MED GY IP 250 OP 250 PS 637

## 2021-09-23 PROCEDURE — 92507 TX SP LANG VOICE COMM INDIV: CPT | Mod: GN | Performed by: SPEECH-LANGUAGE PATHOLOGIST

## 2021-09-23 PROCEDURE — 999N000009 HC STATISTIC AIRWAY CARE

## 2021-09-23 PROCEDURE — 36415 COLL VENOUS BLD VENIPUNCTURE: CPT | Performed by: INTERNAL MEDICINE

## 2021-09-23 PROCEDURE — 99232 SBSQ HOSP IP/OBS MODERATE 35: CPT | Performed by: INTERNAL MEDICINE

## 2021-09-23 PROCEDURE — 250N000009 HC RX 250: Performed by: INTERNAL MEDICINE

## 2021-09-23 PROCEDURE — 92526 ORAL FUNCTION THERAPY: CPT | Mod: GN | Performed by: SPEECH-LANGUAGE PATHOLOGIST

## 2021-09-23 PROCEDURE — 120N000001 HC R&B MED SURG/OB

## 2021-09-23 RX ADMIN — Medication 2 PACKET: at 20:25

## 2021-09-23 RX ADMIN — ANORECTAL OINTMENT: 15.7; .44; 24; 20.6 OINTMENT TOPICAL at 14:56

## 2021-09-23 RX ADMIN — FAMOTIDINE 20 MG: 20 TABLET, FILM COATED ORAL at 09:22

## 2021-09-23 RX ADMIN — METHYLPHENIDATE HYDROCHLORIDE 5 MG: 5 TABLET ORAL at 11:51

## 2021-09-23 RX ADMIN — ACETYLCYSTEINE 2 ML: 200 SOLUTION ORAL; RESPIRATORY (INHALATION) at 07:27

## 2021-09-23 RX ADMIN — METHYLPHENIDATE HYDROCHLORIDE 10 MG: 5 TABLET ORAL at 09:22

## 2021-09-23 RX ADMIN — HYDROCORTISONE: 1 CREAM TOPICAL at 09:23

## 2021-09-23 RX ADMIN — FAMOTIDINE 20 MG: 20 TABLET, FILM COATED ORAL at 20:25

## 2021-09-23 RX ADMIN — VENLAFAXINE 50 MG: 25 TABLET ORAL at 17:19

## 2021-09-23 RX ADMIN — VENLAFAXINE 50 MG: 25 TABLET ORAL at 05:56

## 2021-09-23 RX ADMIN — MIRTAZAPINE 15 MG: 15 TABLET, FILM COATED ORAL at 20:25

## 2021-09-23 RX ADMIN — Medication 2 PACKET: at 09:21

## 2021-09-23 RX ADMIN — ANORECTAL OINTMENT: 15.7; .44; 24; 20.6 OINTMENT TOPICAL at 09:22

## 2021-09-23 RX ADMIN — AMLODIPINE BESYLATE 10 MG: 10 TABLET ORAL at 09:22

## 2021-09-23 RX ADMIN — HEPARIN SODIUM 5000 UNITS: 5000 INJECTION, SOLUTION INTRAVENOUS; SUBCUTANEOUS at 05:57

## 2021-09-23 RX ADMIN — VENLAFAXINE 50 MG: 25 TABLET ORAL at 11:51

## 2021-09-23 RX ADMIN — HYDROCORTISONE: 1 CREAM TOPICAL at 20:26

## 2021-09-23 RX ADMIN — IPRATROPIUM BROMIDE AND ALBUTEROL SULFATE 3 ML: 2.5; .5 SOLUTION RESPIRATORY (INHALATION) at 19:57

## 2021-09-23 RX ADMIN — IPRATROPIUM BROMIDE AND ALBUTEROL SULFATE 3 ML: 2.5; .5 SOLUTION RESPIRATORY (INHALATION) at 07:27

## 2021-09-23 RX ADMIN — HEPARIN SODIUM 5000 UNITS: 5000 INJECTION, SOLUTION INTRAVENOUS; SUBCUTANEOUS at 14:56

## 2021-09-23 RX ADMIN — Medication 2 PACKET: at 14:57

## 2021-09-23 RX ADMIN — VENLAFAXINE 25 MG: 25 TABLET ORAL at 05:56

## 2021-09-23 RX ADMIN — ACETYLCYSTEINE 2 ML: 200 SOLUTION ORAL; RESPIRATORY (INHALATION) at 19:58

## 2021-09-23 RX ADMIN — ANORECTAL OINTMENT: 15.7; .44; 24; 20.6 OINTMENT TOPICAL at 20:25

## 2021-09-23 RX ADMIN — HEPARIN SODIUM 5000 UNITS: 5000 INJECTION, SOLUTION INTRAVENOUS; SUBCUTANEOUS at 22:07

## 2021-09-23 ASSESSMENT — MIFFLIN-ST. JEOR: SCORE: 1553.91

## 2021-09-23 NOTE — PLAN OF CARE
Problem: PT General Care Plan  Goal: Bed Mobility (PT)  Description: PT Bed Mobility  Outcome: No Change  Flowsheets (Taken 9/23/2021 0749)  PT goal: bed mobility: Maximum assist  Goal: Transfer (PT)  Description: PT Transfer  Outcome: No Change  Flowsheets (Taken 9/23/2021 0749)  PT goal: transfers: Maximum assist  Goal: Gait (PT)  Description: PT Gait  Outcome: No Change  Flowsheets (Taken 9/23/2021 0749)  PT goal: gait: (Pt will amb 10 feet with Max A x 2 to promote wt bearing activity for ease with transfers and cares) --  Goal: PT Goal 1  Description: PT Goal 1  Flowsheets (Taken 9/23/2021 0749)  PT goal 1: Pt will perform sitting balance at EOB or EOM for 2 minutes with SBA  Pt making little to no consistent functional gains. Revised POC as family is very supportive of patient and insistent with maximizing pt's functional mobility and recovery. Negin Merchant, PT

## 2021-09-23 NOTE — PLAN OF CARE
Problem: Adult Inpatient Plan of Care  Goal: Absence of Hospital-Acquired Illness or Injury  Intervention: Identify and Manage Fall Risk  Recent Flowsheet Documentation  Taken 9/23/2021 1551 by Rosa M Odonnell, RN  Safety Promotion/Fall Prevention:    assistive device/personal items within reach    bed alarm on    nonskid shoes/slippers when out of bed    room door open  Intervention: Prevent Infection  Recent Flowsheet Documentation  Taken 9/23/2021 1551 by Rosa M Odonnell, RN  Infection Prevention:    equipment surfaces disinfected    hand hygiene promoted    personal protective equipment utilized    single patient room provided   Pt alert, oriented to self. Up in the chair using escobar transfer. Pt needs reorientation with situation, re-education not to grab on bars during transfer. Transferred to chair safely, watching tv at this time. Kept door open, chair alarm on.

## 2021-09-23 NOTE — PLAN OF CARE
Problem: Device-Related Complication Risk (Artificial Airway)  Goal: Optimal Device Function  Outcome: No Change     Problem: Skin and Tissue Injury (Artificial Airway)  Goal: Absence of Device-Related Skin or Tissue Injury  Outcome: No Change     Problem: Communication Impairment (Artificial Airway)  Goal: Effective Communication  Outcome: No Change  RT PROGRESS NOTE     DATA:     CURRENT SETTINGS:             TRACH TYPE / SIZE: #6 Bivona (Placed 08/29)             MODE:  Trach cap             FIO2:   RA     ACTION:             THERAPIES: Duo-neb and Mucomyst BID             SUCTION:  Yes                          FREQUENCY: 1x                        AMOUNT:    Large                        CONSISTENCY: Thick                        COLOR:   White             SPONTANEOUS COUGH EFFORT/STRENGTH OF EFFORT (not elicited by suctioning): Strong cough.                              WEANING PHASE:   3                        WEAN MODE:  Capped on RA                         WEAN TIME:   Continuously as reji.                        END WEAN REASON:        RESPONSE:             BS:   Diminished             VITAL SIGNS: Sat %, HR 81-85, RR 16-20             EMOTIONAL NEEDS / CONCERNS:  None                RISK FOR SELF DECANNULATION:  None                        RISK DUE TO:                          INTERVENTION/S IN PLACE IS/ARE:         NOTE / PLAN:     Pt remains capped and tolerating well with no distress.  Continue current care plan.

## 2021-09-23 NOTE — PLAN OF CARE
Problem: Anxiety  Goal: Anxiety Reduction or Resolution  Outcome: Improving   Pt alert, oriented to self. Got her up in the chair x1. Chair alarm on for safety.   Problem: Electrolyte Imbalance  Goal: Electrolyte Balance  Outcome: Improving   On K protocol last recheck = 3.8mg/dl; recheck sandy AM. Resting in bed at this time.

## 2021-09-23 NOTE — PROGRESS NOTES
[facility]    Medicine Progress Note - Hospitalist Service       Date of Admission:  6/11/2021    Summary:  Dayron Neri is a 70 year male with h/o HTN who presented to ED on 5/15/2021 with acute SAH after a fall with unresponsiveness.  He underwent emergent left external ventricular drain placement.  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  On 5/28/2021, patient had persistent cerebral vasospasm, CT head showed bilateral BAYLEE infarct and was treated with milrinone and verapamil.  On 6/1/2021, angiogram showed no evidence of vasospasm.  Extubated on 6/3/2021.  6/6/2021 he was reintubated due to hypoxia.  On 6/7/2021, patient underwent tracheostomy and PEG tube placement.  EVD removed 6/9/2021.   Patient was treated for possible pneumonia with Unasyn on 5/20/2021, It was switched to ceftriaxone the next day for sputum culture growing Klebsiella. On 5/23, sputum culture also grew Pseudomonas. Antibiotic was switched to Zosyn.  Due to increased WBCs in CSF, possibility of ventriculitis was raised. CSF culture was negative. Antibiotic was switched to cefepime and vancomycin on 5/28/2021 for 2 weeks.  Vanco was discontinued on 6/9/2021. On 6/5/2021, sputum grew ESBL so cefepime was switched to meropenem. He was transferred to [facility] on 6/11/21.     Since admission to [facility], patient did not make significant progression for his neurology status. Trach weaning is also lack of progression due to his cognitive impairment.       Assessment & Plan           Acute on chronic hypoxemic respiratory failure: S/p treatment pseudomonas ESBL and Klebsiella pneumonia. Has excessive secretions. Weaning has been slow. Per pulmonary, his cognition prevents him from improving further. His pulmonary status could improve more if his cognition improves.   - Has been capped on room air for day and night since 8/31. Per pulmonary, no plan for decannulation due to  "high risk of aspiration and airway obstruction. Video swallow from 9/16 suggests no spontaneous cough response at or below the vocal chords resulting in silent aspiration. The trach is considered long term/permanent unless his swallow improves significantly.   - Continue trach care per RT and pulmonary.   - Airway secretion: Glycopyrrolate was tried and stopped. 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 CT head done on 7/1/21 shows resolution of previous multicompartment hemorrhage in the right frontal lobe and the ventricles. CT head repeated on 9/8, shows unchanged encephalomalacia in the right frontal, right temporal, and left paramedian parietal lobes.  - Need follow up with Neurosurgery Dr. Martinez in 3 months post op: A video visit was scheduled on 11/3. They will fax imaging requirements prior to the appointment.      Acute metabolic encephalopathy/cognitive impairment: His current neurology status is likely his new baseline due to frontal lobe injury.  Wife is still hopeful he will improve further but it is unclear if he has the capacity to focus and learn what they are attempting to teach him during therapy.  - Nuvigil started on 8/27 per neurology recommendation, but wife does not think it is helpful. Restarted Ritalin 9/12/21.   - Continue Effexor. Increased on 9/11/21 to 175 mg daily    Dysphagia: On tube feeds and NPO.   - Speech therapy following. Per note from 8/17/21, despite several weeks of working with patient there has been \"no functional improvement in patient's ability to manage his oropharyngeal secretions.\" Speech discussed this with wife. Intensive dysphagia treatment has been discontinued. Repeated video swallow 9/16/2021and showed silent " aspiration.    Severe malnutrition: has moderate/severe subcutaneous fat loss, moderate/severe muscle loss  - continuous tube feeding per dietician     Chronic kidney disease (CKD) stage 3a: Creatinine has been stable.    Anemia: hemoglobin stable. Continue to monitor.    Essential hypertension: BP controlled. 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 Sputum culture 6/19 showed MDR pseudomonas. On Tobramycin nebs 6/24/2021 - 7/9/2021.   - Continue pulmonary toilet, scheduled duonebs    Ventilator associated pneumonia: Completed antibiotics on July 18.    Severe debility, weakness, critical illness, deconditioning: Continue PT/OT    Hypercalcemia: unclear etiology, mild at this point. Parathyroid hormone normal. May be related to relative immobilization. Continue to monitor.    Insomnia: Continue nighttime mirtazapine 7.5 mg scheduled.    Left inguinal hernia: US on 6/22/21 shows large left inguinal hernia containing mesenteric fat and bowel, extending to the superior scrotum, no evidence of intratesticular mass. Currently has no acute issue and no intervention indicated.       Diet: Adult Formula Drip Feeding: Continuous Novasource Renal; Gastrostomy; Goal Rate: 65; mL/hr; Medication - Feeding Tube Flush Frequency: At least 15-30 mL water before and after medication administration and with tube clogging; Amount to Send (Nutri...    DVT Prophylaxis: Heparin SQ  Gutierrez Catheter: Not present  Central Lines: None  Code Status: Full Code      Disposition Plan   Expected discharge: placement to trach facility pending. Patient is accepted to [facility]. However, wife declined the placement.    The patient's care was discussed with the Bedside Nurse and Care Coordinator/.    Yazmin Hooper MD  Hospitalist Service  [facility]  Securely message with the Vocera Web Console (learn more here)  Text page via [employer]  "[place]/Directory      ______________________________________________________________________    Interval History   No overnight event. When patient asked how he is, he always replies \"fine\". Then he started to mumble.     Physical Exam   Vital Signs: Temp: 98  F (36.7  C) Temp src: Oral BP: 131/78 Pulse: 89   Resp: 16 SpO2: 98 % O2 Device: None (Room air)    Weight: 168 lbs 0 oz    General appearance: not in acute distress  HEENT: PERRL, EOMI. Trach in place.  Lungs: Clear breath sounds in bilateral lung fields  Cardiovascular: Regular rate and rhythm, normal S1-S2  Abdomen: Soft, non tender, no distension. Left scrotal hernia, no pain on palpation  Musculoskeletal: No joint swelling  Skin: No rash and no edema  Neurology: Awake and alert. Mumble speech and hard to understand. Moves all four extremities.      Data   Recent Labs   Lab 09/23/21  0651 09/22/21  1259 09/22/21  0645 09/21/21  0625 09/20/21  0701   WBC  --   --   --   --  7.9   HGB  --   --   --   --  10.6*   MCV  --   --   --   --  96   PLT  --   --   --   --  351   NA  --   --   --   --  141   POTASSIUM 3.5 3.8 3.4*   < > 3.5   CHLORIDE  --   --   --   --  103   CO2  --   --   --   --  27   BUN  --   --   --   --  32*   CR  --   --   --   --  0.66*   ANIONGAP  --   --   --   --  11   RAGINI  --   --   --   --  10.5   GLC  --   --   --   --  124    < > = values in this interval not displayed.       Restraint:    Patient pulls his IV line and trach.     Within an hour after restraint an in person face to face assessment was completed, including an evaluation of the patient's immediate reaction to the intervention, behavioral assessment and review/assessment of history, drugs and medications, recent labs, etc., and behavioral condition.  The patient experienced: No adverse physical outcome from seclusion/restraint initiation.  The intervention of restraint or seclusion needs to continue.  "

## 2021-09-23 NOTE — PLAN OF CARE
Problem: Anxiety  Goal: Anxiety Reduction or Resolution  Outcome: Improving     Problem: Skin and Tissue Injury (Artificial Airway)  Goal: Absence of Device-Related Skin or Tissue Injury  Outcome: Improving   Vital signs were stable. Pt. denied any pain and slept the whole shift. Pt. was repositioned every 2 hours. Continue to monitor. Pt. tolerated all the cares well. Continue to monitor.  Margot Sargent RN

## 2021-09-23 NOTE — PLAN OF CARE
Problem: Device-Related Complication Risk (Artificial Airway)  Goal: Optimal Device Function  Outcome: Improving     Problem: Skin and Tissue Injury (Artificial Airway)  Goal: Absence of Device-Related Skin or Tissue Injury  Outcome: Improving     Problem: Communication Impairment (Artificial Airway)  Goal: Effective Communication  Outcome: Improving   RT PROGRESS NOTE     DATA:     CURRENT SETTINGS:             TRACH TYPE / SIZE:  #6 Bivona changed 8/29/21             MODE:   TRACH CAPPED             FIO2:   21%     ACTION:             THERAPIES:  Duoneb bid, mucomyst bid             SUCTION:                           FREQUENCY:   x2                        AMOUNT:   scan to sm                        CONSISTENCY:   thin                        COLOR:   white             SPONTANEOUS COUGH EFFORT/STRENGTH OF EFFORT (not elicited by suctioning): none observed                              WEANING PHASE:   3                        WEAN MODE:    Trach capped 21%                        WEAN TIME:   cont                        END WEAN REASON:        RESPONSE:             BS:   Clear and diminished             VITAL SIGNS:   SPO2 93-96%, HR , RR 16-20             EMOTIONAL NEEDS / CONCERNS:  Confused                RISK FOR SELF DECANNULATION:  NO                        RISK DUE TO:                          INTERVENTION/S IN PLACE IS/ARE:         NOTE / PLAN:     Pt to cont with current poc.

## 2021-09-23 NOTE — PROGRESS NOTES
WOC RN Progress Note    Today's Visit:   No change - Patient continues to have circumferential hyperplasia, extending out 0.2cm 8-4 o'clock and up to 0.5cm 4-8 o'clock  Will continue to assess weekly.  Glenda Bo, SAUDN, RN, PHN, HNB-BC, CWOCN

## 2021-09-23 NOTE — PLAN OF CARE
Pt. Was up in chair x1. Potassium was 3.5 this morning, no replacement was given, redraw scheduled tomorrow AM.     Pt. Denied pain throughout shift. BM x1.    Problem: Skin and Tissue Injury (Artificial Airway)  Goal: Absence of Device-Related Skin or Tissue Injury  Outcome: No Change     Problem: Anxiety  Goal: Anxiety Reduction or Resolution  Outcome: Improving  Intervention: Promote Anxiety Reduction  Recent Flowsheet Documentation  Taken 9/23/2021 0900 by Li Tony RN  Family/Support System Care: involvement promoted     Problem: Electrolyte Imbalance  Goal: Electrolyte Balance  Outcome: Improving

## 2021-09-24 ENCOUNTER — APPOINTMENT (OUTPATIENT)
Dept: SPEECH THERAPY | Facility: CLINIC | Age: 71
DRG: 207 | End: 2021-09-24
Attending: HOSPITALIST
Payer: COMMERCIAL

## 2021-09-24 ENCOUNTER — APPOINTMENT (OUTPATIENT)
Dept: OCCUPATIONAL THERAPY | Facility: CLINIC | Age: 71
DRG: 207 | End: 2021-09-24
Attending: HOSPITALIST
Payer: COMMERCIAL

## 2021-09-24 ENCOUNTER — APPOINTMENT (OUTPATIENT)
Dept: PHYSICAL THERAPY | Facility: CLINIC | Age: 71
DRG: 207 | End: 2021-09-24
Attending: HOSPITALIST
Payer: COMMERCIAL

## 2021-09-24 LAB — POTASSIUM BLD-SCNC: 3.5 MMOL/L (ref 3.5–5)

## 2021-09-24 PROCEDURE — 97530 THERAPEUTIC ACTIVITIES: CPT | Mod: GO | Performed by: OCCUPATIONAL THERAPIST

## 2021-09-24 PROCEDURE — 94640 AIRWAY INHALATION TREATMENT: CPT | Mod: 76

## 2021-09-24 PROCEDURE — 94640 AIRWAY INHALATION TREATMENT: CPT

## 2021-09-24 PROCEDURE — 92507 TX SP LANG VOICE COMM INDIV: CPT | Mod: GN | Performed by: SPEECH-LANGUAGE PATHOLOGIST

## 2021-09-24 PROCEDURE — 999N000009 HC STATISTIC AIRWAY CARE

## 2021-09-24 PROCEDURE — 36415 COLL VENOUS BLD VENIPUNCTURE: CPT | Performed by: INTERNAL MEDICINE

## 2021-09-24 PROCEDURE — 250N000009 HC RX 250: Performed by: INTERNAL MEDICINE

## 2021-09-24 PROCEDURE — 999N000123 HC STATISTIC OXYGEN O2DAILY TECH TIME

## 2021-09-24 PROCEDURE — 250N000013 HC RX MED GY IP 250 OP 250 PS 637: Performed by: INTERNAL MEDICINE

## 2021-09-24 PROCEDURE — 120N000001 HC R&B MED SURG/OB

## 2021-09-24 PROCEDURE — 97112 NEUROMUSCULAR REEDUCATION: CPT | Mod: GP | Performed by: PHYSICAL THERAPIST

## 2021-09-24 PROCEDURE — 92526 ORAL FUNCTION THERAPY: CPT | Mod: GN | Performed by: SPEECH-LANGUAGE PATHOLOGIST

## 2021-09-24 PROCEDURE — 99207 PR CDG-MDM COMPONENT: MEETS MODERATE - DOWN CODED: CPT | Performed by: INTERNAL MEDICINE

## 2021-09-24 PROCEDURE — 250N000011 HC RX IP 250 OP 636

## 2021-09-24 PROCEDURE — 99232 SBSQ HOSP IP/OBS MODERATE 35: CPT | Performed by: INTERNAL MEDICINE

## 2021-09-24 PROCEDURE — 97129 THER IVNTJ 1ST 15 MIN: CPT | Mod: GO | Performed by: OCCUPATIONAL THERAPIST

## 2021-09-24 PROCEDURE — 84132 ASSAY OF SERUM POTASSIUM: CPT | Performed by: INTERNAL MEDICINE

## 2021-09-24 PROCEDURE — 250N000013 HC RX MED GY IP 250 OP 250 PS 637: Performed by: NURSE PRACTITIONER

## 2021-09-24 PROCEDURE — 97530 THERAPEUTIC ACTIVITIES: CPT | Mod: GP | Performed by: PHYSICAL THERAPIST

## 2021-09-24 PROCEDURE — 250N000013 HC RX MED GY IP 250 OP 250 PS 637: Performed by: HOSPITALIST

## 2021-09-24 PROCEDURE — 999N000157 HC STATISTIC RCP TIME EA 10 MIN

## 2021-09-24 PROCEDURE — 250N000013 HC RX MED GY IP 250 OP 250 PS 637

## 2021-09-24 RX ADMIN — HYDROCORTISONE: 1 CREAM TOPICAL at 09:42

## 2021-09-24 RX ADMIN — ANORECTAL OINTMENT: 15.7; .44; 24; 20.6 OINTMENT TOPICAL at 13:31

## 2021-09-24 RX ADMIN — ANORECTAL OINTMENT: 15.7; .44; 24; 20.6 OINTMENT TOPICAL at 20:18

## 2021-09-24 RX ADMIN — VENLAFAXINE 50 MG: 25 TABLET ORAL at 17:19

## 2021-09-24 RX ADMIN — METHYLPHENIDATE HYDROCHLORIDE 10 MG: 5 TABLET ORAL at 09:40

## 2021-09-24 RX ADMIN — METHYLPHENIDATE HYDROCHLORIDE 5 MG: 5 TABLET ORAL at 12:45

## 2021-09-24 RX ADMIN — ANORECTAL OINTMENT: 15.7; .44; 24; 20.6 OINTMENT TOPICAL at 09:42

## 2021-09-24 RX ADMIN — FAMOTIDINE 20 MG: 20 TABLET, FILM COATED ORAL at 20:18

## 2021-09-24 RX ADMIN — HYDROCORTISONE: 1 CREAM TOPICAL at 20:19

## 2021-09-24 RX ADMIN — Medication 2 PACKET: at 13:29

## 2021-09-24 RX ADMIN — VENLAFAXINE 50 MG: 25 TABLET ORAL at 05:29

## 2021-09-24 RX ADMIN — IPRATROPIUM BROMIDE AND ALBUTEROL SULFATE 3 ML: 2.5; .5 SOLUTION RESPIRATORY (INHALATION) at 07:35

## 2021-09-24 RX ADMIN — MIRTAZAPINE 15 MG: 15 TABLET, FILM COATED ORAL at 20:18

## 2021-09-24 RX ADMIN — ACETYLCYSTEINE 2 ML: 200 SOLUTION ORAL; RESPIRATORY (INHALATION) at 19:31

## 2021-09-24 RX ADMIN — HEPARIN SODIUM 5000 UNITS: 5000 INJECTION, SOLUTION INTRAVENOUS; SUBCUTANEOUS at 21:33

## 2021-09-24 RX ADMIN — HEPARIN SODIUM 5000 UNITS: 5000 INJECTION, SOLUTION INTRAVENOUS; SUBCUTANEOUS at 05:35

## 2021-09-24 RX ADMIN — HEPARIN SODIUM 5000 UNITS: 5000 INJECTION, SOLUTION INTRAVENOUS; SUBCUTANEOUS at 13:29

## 2021-09-24 RX ADMIN — IPRATROPIUM BROMIDE AND ALBUTEROL SULFATE 3 ML: 2.5; .5 SOLUTION RESPIRATORY (INHALATION) at 19:30

## 2021-09-24 RX ADMIN — SCOPALAMINE 1 PATCH: 1 PATCH, EXTENDED RELEASE TRANSDERMAL at 13:31

## 2021-09-24 RX ADMIN — VENLAFAXINE 50 MG: 25 TABLET ORAL at 12:45

## 2021-09-24 RX ADMIN — ACETYLCYSTEINE 2 ML: 200 SOLUTION ORAL; RESPIRATORY (INHALATION) at 07:34

## 2021-09-24 RX ADMIN — AMLODIPINE BESYLATE 10 MG: 10 TABLET ORAL at 09:40

## 2021-09-24 RX ADMIN — VENLAFAXINE 25 MG: 25 TABLET ORAL at 05:29

## 2021-09-24 RX ADMIN — Medication 2 PACKET: at 20:18

## 2021-09-24 RX ADMIN — Medication 2 PACKET: at 09:41

## 2021-09-24 RX ADMIN — FAMOTIDINE 20 MG: 20 TABLET, FILM COATED ORAL at 09:39

## 2021-09-24 RX ADMIN — ACETAMINOPHEN ORAL SOLUTION 650 MG: 650 SOLUTION ORAL at 20:18

## 2021-09-24 ASSESSMENT — MIFFLIN-ST. JEOR: SCORE: 1523.86

## 2021-09-24 NOTE — PLAN OF CARE
Patient denied any pain, up on the chair,attended schedule therapy and tolerated well. Family visited on Ipad. Will continue to [facility].

## 2021-09-24 NOTE — PROGRESS NOTES
[facility]    Medicine Progress Note - Hospitalist Service       Date of Admission:  6/11/2021    Summary:  Dayron Neri is a 70 year male with h/o HTN who presented to ED on 5/15/2021 with acute SAH after a fall with unresponsiveness.  He underwent emergent left external ventricular drain placement.  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  On 5/28/2021, patient had persistent cerebral vasospasm, CT head showed bilateral BAYLEE infarct and was treated with milrinone and verapamil.  On 6/1/2021, angiogram showed no evidence of vasospasm.  Extubated on 6/3/2021.  6/6/2021 he was reintubated due to hypoxia.  On 6/7/2021, patient underwent tracheostomy and PEG tube placement.  EVD removed 6/9/2021.   Patient was treated for possible pneumonia with Unasyn on 5/20/2021, It was switched to ceftriaxone the next day for sputum culture growing Klebsiella. On 5/23, sputum culture also grew Pseudomonas. Antibiotic was switched to Zosyn.  Due to increased WBCs in CSF, possibility of ventriculitis was raised. CSF culture was negative. Antibiotic was switched to cefepime and vancomycin on 5/28/2021 for 2 weeks.  Vanco was discontinued on 6/9/2021. On 6/5/2021, sputum grew ESBL so cefepime was switched to meropenem. He was transferred to [facility] on 6/11/21.     Since admission to [facility], patient did not make significant progression for his neurology status. Trach weaning is also lack of progression due to his cognitive impairment.       Assessment & Plan           Acute on chronic hypoxemic respiratory failure: S/p treatment pseudomonas ESBL and Klebsiella pneumonia. Has excessive secretions. Weaning has been slow. Per pulmonary, his cognition prevents him from improving further. His pulmonary status could improve more if his cognition improves.   - Has been capped on room air for day and night since 8/31. Per pulmonary, no plan for decannulation due to  "high risk of aspiration and airway obstruction. Video swallow from 9/16 suggests no spontaneous cough response at or below the vocal chords resulting in silent aspiration. The trach is considered long term/permanent unless his swallow improves significantly.   - Continue trach care per RT and pulmonary.   - Airway secretion: Glycopyrrolate was tried and stopped. 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 CT head done on 7/1/21 shows resolution of previous multicompartment hemorrhage in the right frontal lobe and the ventricles. CT head repeated on 9/8, shows unchanged encephalomalacia in the right frontal, right temporal, and left paramedian parietal lobes.  - Need follow up with Neurosurgery Dr. Martinez in 3 months post op: A video visit was scheduled on 11/3. They will fax imaging requirements prior to the appointment.      Acute metabolic encephalopathy/cognitive impairment: His current neurology status is likely his new baseline due to frontal lobe injury.  Wife is still hopeful he will improve further but it is unclear if he has the capacity to focus and learn what they are attempting to teach him during therapy.  - Nuvigil started on 8/27 per neurology recommendation, but wife does not think it is helpful. Restarted Ritalin 9/12/21.   - Continue Effexor. Increased on 9/11/21 to 175 mg daily    Dysphagia: On tube feeds and NPO.   - Speech therapy following. Per note from 8/17/21, despite several weeks of working with patient there has been \"no functional improvement in patient's ability to manage his oropharyngeal secretions.\" Speech discussed this with wife. Intensive dysphagia treatment has been discontinued. Repeated video swallow 9/16/2021and showed silent " aspiration.    Severe malnutrition: has moderate/severe subcutaneous fat loss, moderate/severe muscle loss  - continuous tube feeding per dietician     Chronic kidney disease (CKD) stage 3a: Creatinine has been stable.    Anemia: hemoglobin stable. Continue to monitor.    Essential hypertension: BP controlled. 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 Sputum culture 6/19 showed MDR pseudomonas. On Tobramycin nebs 6/24/2021 - 7/9/2021.   - Continue pulmonary toilet, scheduled duonebs    Ventilator associated pneumonia: Completed antibiotics on July 18.    Severe debility, weakness, critical illness, deconditioning: Continue PT/OT    Hypercalcemia: unclear etiology, mild at this point. Parathyroid hormone normal. May be related to relative immobilization. Continue to monitor.    Insomnia: Continue nighttime mirtazapine 7.5 mg scheduled.    Left inguinal hernia: US on 6/22/21 shows large left inguinal hernia containing mesenteric fat and bowel, extending to the superior scrotum, no evidence of intratesticular mass. Currently has no acute issue and no intervention indicated.       Diet: Adult Formula Drip Feeding: Continuous Novasource Renal; Gastrostomy; Goal Rate: 65; mL/hr; Medication - Feeding Tube Flush Frequency: At least 15-30 mL water before and after medication administration and with tube clogging; Amount to Send (Nutri...    DVT Prophylaxis: Heparin SQ  Gutierrez Catheter: Not present  Central Lines: None  Code Status: Full Code      Disposition Plan   Expected discharge: placement to trach facility pending. Patient is accepted to [facility]. However, wife declined the placement.    The patient's care was discussed with the Bedside Nurse and Care Coordinator/.    Yazmin Hooper MD  Hospitalist Service  [facility]  Securely message with the Vocera Web Console (learn more here)  Text page via [employer]  "Paging/Directory      ______________________________________________________________________    Interval History   Patient is in good spirit today. No event.   Per social work, the IMM documentation were presented to patient's spouse Susy today. Susy and patient's daughter were watching patient over the iPAD as usual today. When asked Susy for questions, she states that she does not understand why patient is medically ready for discharge since he still has the tracheostomy. I explained to her that according to the recommendation from the pulmonary team, the tracheostomy is going to be chronic and there is no plan to take it out. She then said it loudly, \"I want that to be taken out.\" \" I want you guys to continue to work on that until it is taken out!\" I explained to her that the staff have been trying hard during his entire hospital stay to wean him off the trach. However, it has been proved multiple times that it is not appropriate. Susy then kept saying: \"I want that to be taken out\" \" I will keep fighting for him!\"    Physical Exam   Vital Signs: Temp: 97.8  F (36.6  C) Temp src: Axillary BP: 104/70 Pulse: 82   Resp: 20 SpO2: 96 % O2 Device: None (Room air)    Weight: 161 lbs 6 oz    General appearance: not in acute distress  HEENT: PERRL, EOMI. Trach in place.  Lungs: Clear breath sounds in bilateral lung fields  Cardiovascular: Regular rate and rhythm, normal S1-S2  Abdomen: Soft, non tender, no distension. Left scrotal hernia, no pain on palpation  Musculoskeletal: No joint swelling  Skin: No rash and no edema  Neurology: Awake and alert. Mumble speech and hard to understand. Moves all four extremities.      Data   Recent Labs   Lab 09/24/21  0720 09/23/21  0651 09/22/21  1259 09/21/21  0625 09/20/21  0701   WBC  --   --   --   --  7.9   HGB  --   --   --   --  10.6*   MCV  --   --   --   --  96   PLT  --   --   --   --  351   NA  --   --   --   --  141   POTASSIUM 3.5 3.5 3.8   < > 3.5   CHLORIDE  --   --   " --   --  103   CO2  --   --   --   --  27   BUN  --   --   --   --  32*   CR  --   --   --   --  0.66*   ANIONGAP  --   --   --   --  11   RAGINI  --   --   --   --  10.5   GLC  --   --   --   --  124    < > = values in this interval not displayed.       Restraint:    Patient pulls his IV line and trach.     Within an hour after restraint an in person face to face assessment was completed, including an evaluation of the patient's immediate reaction to the intervention, behavioral assessment and review/assessment of history, drugs and medications, recent labs, etc., and behavioral condition.  The patient experienced: No adverse physical outcome from seclusion/restraint initiation.  The intervention of restraint or seclusion needs to continue.

## 2021-09-24 NOTE — SIGNIFICANT EVENT
The ipad is not working at this time. Wife called multiple times and different staff members tried but it showing the ID is invalid. I explained to the wife the problem. She said she will call back tomorrow.

## 2021-09-24 NOTE — PLAN OF CARE
Problem: Adult Inpatient Plan of Care  Goal: Plan of Care Review  Outcome: Improving  Goal: Absence of Hospital-Acquired Illness or Injury  Outcome: Improving  Intervention: Identify and Manage Fall Risk  Recent Flowsheet Documentation  Taken 9/24/2021 0227 by Елена Rai RN  Safety Promotion/Fall Prevention:   bed alarm on   room door open   room near nurse's station   safety round/check completed  Intervention: Prevent Infection  Recent Flowsheet Documentation  Taken 9/24/2021 0227 by Елена Rai RN  Infection Prevention:   cohorting utilized   hand hygiene promoted   personal protective equipment utilized   rest/sleep promoted  Goal: Optimal Comfort and Wellbeing  Outcome: Improving  Intervention: Provide Person-Centered Care  Recent Flowsheet Documentation  Taken 9/24/2021 0227 by Елена Rai RN  Trust Relationship/Rapport: care explained   Patient with infrequent cough; suctioning tolerated.  Patient calm and cooperative with cares; slept most of the shift; no s/s of pain noted.  Trach is capped

## 2021-09-24 NOTE — PLAN OF CARE
Problem: Anxiety  Goal: Anxiety Reduction or Resolution  Outcome: Improving     Problem: Pain Chronic (Persistent)  Goal: Acceptable Pain Control and Functional Ability  Outcome: Improving   Vital signs were stable. Pt. denied any pain and was comfortable for the whole shift. Pt. was repositioned every 2 hours. Pt. Tolerated all the cares well. Continue to monitor.  Margot Sargent RN

## 2021-09-24 NOTE — PLAN OF CARE
Problem: Communication Impairment (Artificial Airway)  Goal: Effective Communication  Outcome: Improving     Problem: Device-Related Complication Risk (Artificial Airway)  Goal: Optimal Device Function  Outcome: Improving     Problem: Skin and Tissue Injury (Artificial Airway)  Goal: Absence of Device-Related Skin or Tissue Injury  Outcome: Improving   RT PROGRESS NOTE     DATA:     CURRENT SETTINGS:               TRACH TYPE / SIZE: #6 BIVONA TTS Changed on 8/29/21             MODE: Trach Capped              FIO2:  21%     ACTION:             THERAPIES: DuoNeb/ Mucomyst neb BID                SUCTION:                           FREQUENCY: X1                        AMOUNT: large                        CONSISTENCY: thick                        COLOR:  White              SPONTANEOUS COUGH EFFORT/STRENGTH OF EFFORT (not elicited by suctioning): Fair                               WEANING PHASE: 3                        WEAN MODE: Trach Capped                         WEAN TIME:  Cont.                        END WEAN REASON:      RESPONSE:             BS :Clear Decreased             VITAL SIGNS: Sat 96-97%, HR 82-82, RR 20-22             EMOTIONAL NEEDS / CONCERNS:confused               RISK FOR SELF DECANNULATION: no                        RISK DUE TO: no                         INTERVENTION/S IN PLACE IS/ARE:      NOTE / PLAN:  Cont. Current plan of care

## 2021-09-24 NOTE — PLAN OF CARE
Problem: Device-Related Complication Risk (Artificial Airway)  Goal: Optimal Device Function  Outcome: Improving     Problem: Skin and Tissue Injury (Artificial Airway)  Goal: Absence of Device-Related Skin or Tissue Injury  Outcome: Improving     Problem: Communication Impairment (Artificial Airway)  Goal: Effective Communication  Outcome: Improving     RT PROGRESS NOTE     DATA:     CURRENT SETTINGS:             TRACH TYPE / SIZE: #6 Bivona placed on 08/29/21             MODE:   Trach capped             FIO2:   RA     ACTION:             THERAPIES:   DUO NEB BID/MUCOMYST BID             SUCTION:                           FREQUENCY:   X1                        AMOUNT:   Small                        CONSISTENCY:   Thick                        COLOR:   White/yellow             SPONTANEOUS COUGH EFFORT/STRENGTH OF EFFORT (not elicited by suctioning): Yes:Strong                              WEANING PHASE: #3                        WEAN MODE: Trach capped/ra as reji                        WEAN TIME:                          END WEAN REASON: Cont     RESPONSE:             BS: Coarse             VITAL SIGNS:  SAT 95-99%, HR 84-92, RR 20-24             EMOTIONAL NEEDS / CONCERNS: N/A                RISK FOR SELF DECANNULATION: Yes                        RISK DUE TO:  Restless                        INTERVENTION/S IN PLACE IS/ARE: Bilateral luis     NOTE / PLAN:   Pt is on trach capped with room air, reji well. Cont current therapy and keep sat >/=90%

## 2021-09-24 NOTE — PROGRESS NOTES
Social Work Note:    Writer spoke with attending MD-Dr. Elder to confirm patient is medically stable and ready for discharge.   Writer called and spoke with spouse-Susy.  Writer again informed spouse patient ready and medically stable for discharge.  Writer again informed spouse that [facility] has screened and accepted patient.  Spouse again firmly/strongly declined placement at [facility].  Writer spoke with the spouse, at length, about IMM and IMM process.  Writer informed spouse writer will sign IMM on her behalf and email her a copy.    Writer explained the IMM process, and that the next step could be a HIN/denial of payment/and that she and patient could be fully and completely responsible for the entire in-patient hospital bill/account.  When asked, spouse expressed full understanding.    ADD:  Follow up call with spouse confirming she received and has read the IMM that was emailed to her.   ADD2: HINN was discussded and explained to spouse, at length, over the phone.  Writer and RN case manager signed letter and emailed to spouse.  RN case manager was present and witnessed the call with spouse.    ADD#: Called and spoke with Shea @ [facility] [phone].  Case # [id number]. Clinical information faxed to Don [phone] today      Dayron Chen [facility]/[place]  [phone]

## 2021-09-25 LAB
POTASSIUM BLD-SCNC: 3.4 MMOL/L (ref 3.5–5)
POTASSIUM BLD-SCNC: 3.6 MMOL/L (ref 3.5–5)

## 2021-09-25 PROCEDURE — U0005 INFEC AGEN DETEC AMPLI PROBE: HCPCS | Performed by: INTERNAL MEDICINE

## 2021-09-25 PROCEDURE — 94640 AIRWAY INHALATION TREATMENT: CPT

## 2021-09-25 PROCEDURE — 94640 AIRWAY INHALATION TREATMENT: CPT | Mod: 76

## 2021-09-25 PROCEDURE — 250N000013 HC RX MED GY IP 250 OP 250 PS 637: Performed by: INTERNAL MEDICINE

## 2021-09-25 PROCEDURE — 250N000013 HC RX MED GY IP 250 OP 250 PS 637

## 2021-09-25 PROCEDURE — 84132 ASSAY OF SERUM POTASSIUM: CPT | Performed by: INTERNAL MEDICINE

## 2021-09-25 PROCEDURE — 36415 COLL VENOUS BLD VENIPUNCTURE: CPT | Performed by: INTERNAL MEDICINE

## 2021-09-25 PROCEDURE — 120N000001 HC R&B MED SURG/OB

## 2021-09-25 PROCEDURE — 250N000013 HC RX MED GY IP 250 OP 250 PS 637: Performed by: HOSPITALIST

## 2021-09-25 PROCEDURE — 250N000013 HC RX MED GY IP 250 OP 250 PS 637: Performed by: NURSE PRACTITIONER

## 2021-09-25 PROCEDURE — 250N000011 HC RX IP 250 OP 636

## 2021-09-25 PROCEDURE — 999N000157 HC STATISTIC RCP TIME EA 10 MIN

## 2021-09-25 PROCEDURE — 999N000009 HC STATISTIC AIRWAY CARE

## 2021-09-25 PROCEDURE — 250N000009 HC RX 250: Performed by: INTERNAL MEDICINE

## 2021-09-25 PROCEDURE — 99232 SBSQ HOSP IP/OBS MODERATE 35: CPT | Performed by: INTERNAL MEDICINE

## 2021-09-25 PROCEDURE — 999N000123 HC STATISTIC OXYGEN O2DAILY TECH TIME

## 2021-09-25 RX ORDER — POTASSIUM CHLORIDE 20MEQ/15ML
40 LIQUID (ML) ORAL ONCE
Status: COMPLETED | OUTPATIENT
Start: 2021-09-25 | End: 2021-09-25

## 2021-09-25 RX ADMIN — VENLAFAXINE 50 MG: 25 TABLET ORAL at 12:17

## 2021-09-25 RX ADMIN — HYDROCORTISONE: 1 CREAM TOPICAL at 08:55

## 2021-09-25 RX ADMIN — ACETYLCYSTEINE 2 ML: 200 SOLUTION ORAL; RESPIRATORY (INHALATION) at 19:41

## 2021-09-25 RX ADMIN — ACETYLCYSTEINE 2 ML: 200 SOLUTION ORAL; RESPIRATORY (INHALATION) at 09:07

## 2021-09-25 RX ADMIN — Medication 2 PACKET: at 21:26

## 2021-09-25 RX ADMIN — HEPARIN SODIUM 5000 UNITS: 5000 INJECTION, SOLUTION INTRAVENOUS; SUBCUTANEOUS at 06:14

## 2021-09-25 RX ADMIN — ANORECTAL OINTMENT: 15.7; .44; 24; 20.6 OINTMENT TOPICAL at 21:07

## 2021-09-25 RX ADMIN — Medication 2 PACKET: at 14:26

## 2021-09-25 RX ADMIN — IPRATROPIUM BROMIDE AND ALBUTEROL SULFATE 3 ML: 2.5; .5 SOLUTION RESPIRATORY (INHALATION) at 19:41

## 2021-09-25 RX ADMIN — VENLAFAXINE 50 MG: 25 TABLET ORAL at 16:33

## 2021-09-25 RX ADMIN — HYDROCORTISONE: 1 CREAM TOPICAL at 21:07

## 2021-09-25 RX ADMIN — AMLODIPINE BESYLATE 10 MG: 10 TABLET ORAL at 08:54

## 2021-09-25 RX ADMIN — FAMOTIDINE 20 MG: 20 TABLET, FILM COATED ORAL at 08:54

## 2021-09-25 RX ADMIN — METHYLPHENIDATE HYDROCHLORIDE 10 MG: 5 TABLET ORAL at 08:53

## 2021-09-25 RX ADMIN — HEPARIN SODIUM 5000 UNITS: 5000 INJECTION, SOLUTION INTRAVENOUS; SUBCUTANEOUS at 21:06

## 2021-09-25 RX ADMIN — VENLAFAXINE 50 MG: 25 TABLET ORAL at 06:14

## 2021-09-25 RX ADMIN — HEPARIN SODIUM 5000 UNITS: 5000 INJECTION, SOLUTION INTRAVENOUS; SUBCUTANEOUS at 14:26

## 2021-09-25 RX ADMIN — ANORECTAL OINTMENT: 15.7; .44; 24; 20.6 OINTMENT TOPICAL at 08:54

## 2021-09-25 RX ADMIN — FAMOTIDINE 20 MG: 20 TABLET, FILM COATED ORAL at 21:06

## 2021-09-25 RX ADMIN — VENLAFAXINE 25 MG: 25 TABLET ORAL at 06:13

## 2021-09-25 RX ADMIN — ANORECTAL OINTMENT: 15.7; .44; 24; 20.6 OINTMENT TOPICAL at 14:27

## 2021-09-25 RX ADMIN — MIRTAZAPINE 15 MG: 15 TABLET, FILM COATED ORAL at 21:06

## 2021-09-25 RX ADMIN — IPRATROPIUM BROMIDE AND ALBUTEROL SULFATE 3 ML: 2.5; .5 SOLUTION RESPIRATORY (INHALATION) at 09:07

## 2021-09-25 RX ADMIN — METHYLPHENIDATE HYDROCHLORIDE 5 MG: 5 TABLET ORAL at 12:17

## 2021-09-25 RX ADMIN — Medication 2 PACKET: at 08:54

## 2021-09-25 RX ADMIN — POTASSIUM CHLORIDE 40 MEQ: 20 SOLUTION ORAL at 15:22

## 2021-09-25 ASSESSMENT — MIFFLIN-ST. JEOR: SCORE: 1528.51

## 2021-09-25 NOTE — PROGRESS NOTES
[facility]    Medicine Progress Note - Hospitalist Service       Date of Admission:  6/11/2021    Summary:  Dayron Neri is a 70 year male with h/o HTN who presented to ED on 5/15/2021 with acute SAH after a fall with unresponsiveness.  He underwent emergent left external ventricular drain placement.  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  On 5/28/2021, patient had persistent cerebral vasospasm, CT head showed bilateral BAYLEE infarct and was treated with milrinone and verapamil.  On 6/1/2021, angiogram showed no evidence of vasospasm.  Extubated on 6/3/2021.  6/6/2021 he was reintubated due to hypoxia.  On 6/7/2021, patient underwent tracheostomy and PEG tube placement.  EVD removed 6/9/2021.   Patient was treated for possible pneumonia with Unasyn on 5/20/2021, It was switched to ceftriaxone the next day for sputum culture growing Klebsiella. On 5/23, sputum culture also grew Pseudomonas. Antibiotic was switched to Zosyn.  Due to increased WBCs in CSF, possibility of ventriculitis was raised. CSF culture was negative. Antibiotic was switched to cefepime and vancomycin on 5/28/2021 for 2 weeks.  Vanco was discontinued on 6/9/2021. On 6/5/2021, sputum grew ESBL so cefepime was switched to meropenem. He was transferred to [facility] on 6/11/21.     Since admission to [facility], patient did not make significant progression for his neurology status. Trach weaning is also lack of progression due to his cognitive impairment.       Assessment & Plan           Acute on chronic hypoxemic respiratory failure: S/p treatment pseudomonas ESBL and Klebsiella pneumonia. Has excessive secretions. Weaning has been slow. Per pulmonary, his cognition prevents him from improving further. His pulmonary status could improve more if his cognition improves.   - Has been capped on room air for day and night since 8/31. Per pulmonary, no plan for decannulation due to  "high risk of aspiration and airway obstruction. Video swallow from 9/16 suggests no spontaneous cough response at or below the vocal chords resulting in silent aspiration. The trach is considered long term/permanent unless his swallow improves significantly.   - Continue trach care per RT and pulmonary.   - Airway secretion: Glycopyrrolate was tried and stopped. 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 CT head done on 7/1/21 shows resolution of previous multicompartment hemorrhage in the right frontal lobe and the ventricles. CT head repeated on 9/8, shows unchanged encephalomalacia in the right frontal, right temporal, and left paramedian parietal lobes.  - Need follow up with Neurosurgery Dr. Martinez in 3 months post op: A video visit was scheduled on 11/3. They will fax imaging requirements prior to the appointment.      Acute metabolic encephalopathy/cognitive impairment: His current neurology status is likely his new baseline due to frontal lobe injury.  Wife is still hopeful he will improve further but it is unclear if he has the capacity to focus and learn what they are attempting to teach him during therapy.  - Nuvigil started on 8/27 per neurology recommendation, but wife does not think it is helpful. Restarted Ritalin 9/12/21.   - Continue Effexor. Increased on 9/11/21 to 175 mg daily    Dysphagia: On tube feeds and NPO.   - Speech therapy following. Per note from 8/17/21, despite several weeks of working with patient there has been \"no functional improvement in patient's ability to manage his oropharyngeal secretions.\" Speech discussed this with wife. Intensive dysphagia treatment has been discontinued. Repeated video swallow 9/16/2021and showed silent " aspiration.    Severe malnutrition: has moderate/severe subcutaneous fat loss, moderate/severe muscle loss  - continuous tube feeding per dietician     Chronic kidney disease (CKD) stage 3a: Creatinine has been stable.    Anemia: hemoglobin stable. Continue to monitor.    Essential hypertension: BP controlled. 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 Sputum culture 6/19 showed MDR pseudomonas. On Tobramycin nebs 6/24/2021 - 7/9/2021.   - Continue pulmonary toilet, scheduled duonebs    Ventilator associated pneumonia: Completed antibiotics on July 18.    Severe debility, weakness, critical illness, deconditioning: Continue PT/OT    Hypercalcemia: unclear etiology, mild at this point. Parathyroid hormone normal. May be related to relative immobilization. Continue to monitor.    Insomnia: Continue nighttime mirtazapine 7.5 mg scheduled.    Left inguinal hernia: US on 6/22/21 shows large left inguinal hernia containing mesenteric fat and bowel, extending to the superior scrotum, no evidence of intratesticular mass. Currently has no acute issue and no intervention indicated.       Diet: Adult Formula Drip Feeding: Continuous Novasource Renal; Gastrostomy; Goal Rate: 65; mL/hr; Medication - Feeding Tube Flush Frequency: At least 15-30 mL water before and after medication administration and with tube clogging; Amount to Send (Nutri...    DVT Prophylaxis: Heparin SQ  Gutierrez Catheter: Not present  Central Lines: None  Code Status: Full Code      Disposition Plan   Expected discharge: placement to trach facility pending. Patient is accepted to [facility]. However, wife declined the placement.    The patient's care was discussed with the Bedside Nurse and Care Coordinator/.    Yazmin Hooper MD  Hospitalist Service  [facility]  Securely message with the Vocera Web Console (learn more here)  Text page via [employer]  [place]/Directory      ______________________________________________________________________    Interval History   Patient was sitting up in the chair today. His daughter talked to him over the iPAD. Patient only answered a few simple questions with yes or no. He then did not pay attention to her daughter any longer despite she kept talking to him.     Physical Exam   Vital Signs: Temp: 98.3  F (36.8  C) Temp src: Axillary BP: 137/88 Pulse: 89   Resp: 19 SpO2: 94 % O2 Device: None (Room air)    Weight: 162 lbs 6.4 oz    General appearance: not in acute distress  HEENT: PERRL, EOMI. Trach in place.  Lungs: Clear breath sounds in bilateral lung fields  Cardiovascular: Regular rate and rhythm, normal S1-S2  Abdomen: Soft, non tender, no distension. Left scrotal hernia, no pain on palpation  Musculoskeletal: No joint swelling  Skin: No rash and no edema  Neurology: Awake and alert. Mumble speech and hard to understand. Moves all four extremities.      Data   Recent Labs   Lab 09/25/21  0656 09/24/21  0720 09/23/21  0651 09/21/21  0625 09/20/21  0701   WBC  --   --   --   --  7.9   HGB  --   --   --   --  10.6*   MCV  --   --   --   --  96   PLT  --   --   --   --  351   NA  --   --   --   --  141   POTASSIUM 3.4* 3.5 3.5   < > 3.5   CHLORIDE  --   --   --   --  103   CO2  --   --   --   --  27   BUN  --   --   --   --  32*   CR  --   --   --   --  0.66*   ANIONGAP  --   --   --   --  11   RAGINI  --   --   --   --  10.5   GLC  --   --   --   --  124    < > = values in this interval not displayed.       Restraint:    Patient pulls his IV line and trach.     Within an hour after restraint an in person face to face assessment was completed, including an evaluation of the patient's immediate reaction to the intervention, behavioral assessment and review/assessment of history, drugs and medications, recent labs, etc., and behavioral condition.  The patient experienced: No adverse physical outcome from seclusion/restraint  initiation.  The intervention of restraint or seclusion needs to continue.

## 2021-09-25 NOTE — PLAN OF CARE
Problem: Communication Impairment (Artificial Airway)  Goal: Effective Communication  Outcome: Improving     Problem: Device-Related Complication Risk (Artificial Airway)  Goal: Optimal Device Function  Outcome: Improving     Problem: Skin and Tissue Injury (Artificial Airway)  Goal: Absence of Device-Related Skin or Tissue Injury  Outcome: Improving   RT PROGRESS NOTE     DATA:     CURRENT SETTINGS:               TRACH TYPE / SIZE: #6 BIVONA TTS Changed on 8/29/21             MODE: Trach Capped              FIO2:  21%     ACTION:             THERAPIES: DuoNeb/ Mucomyst neb BID                 SUCTION:                           FREQUENCY: none                        AMOUNT:                         CONSISTENCY:                         COLOR:               SPONTANEOUS COUGH EFFORT/STRENGTH OF EFFORT (not elicited by suctioning): Fair                               WEANING PHASE: 3                        WEAN MODE: Trach Capped                         WEAN TIME:  as tolerated                        END WEAN REASON:      RESPONSE:             BS :Clear diminished             VITAL SIGNS: Sat 96-97%, HR 95-97 RR 18-20             EMOTIONAL NEEDS / CONCERNS:confused               RISK FOR SELF DECANNULATION: no                        RISK DUE TO:                          INTERVENTION/S IN PLACE IS/ARE:      NOTE / PLAN: Continue current POC

## 2021-09-25 NOTE — PLAN OF CARE
Problem: Device-Related Complication Risk (Mechanical Ventilation, Invasive)  Goal: Optimal Device Function  Intervention: Optimize Device Care and Function  Recent Flowsheet Documentation  Taken 9/25/2021 0908 by Tye Samayoa, RT  Airway Safety Measures: all equipment/monitors on and audible     Problem: Inability to Wean (Mechanical Ventilation, Invasive)  Goal: Mechanical Ventilation Liberation  Intervention: Promote Extubation and Mechanical Ventilation Liberation  Recent Flowsheet Documentation  Taken 9/25/2021 0908 by Tye Samayoa, RT  Environmental Support: calm environment promoted   RT PROGRESS NOTE     DATA:     CURRENT SETTINGS: trach capped continuous              TRACH TYPE / SIZE:  Bivona, # 6 trach              MODE:   capped / plug             FIO2:  room air   ACTION:             THERAPIES:  Duo-neb / Mucomyst bid             SUCTION:  yes                         FREQUENCY:   x2                        AMOUNT:   small                         CONSISTENCY:   loose thin                         COLOR:   white              SPONTANEOUS COUGH EFFORT/STRENGTH OF EFFORT (not elicited by suctioning): good/productive                               WEANING PHASE:   phase three vent pathway                         WEAN MODE:    trach capped continuous                         WEAN TIME:   continuous                         END WEAN REASON:   ongoing      RESPONSE:             BS:   clear              VITAL SIGNS:   95/94/20-22             EMOTIONAL NEEDS / CONCERNS:  none                RISK FOR SELF DECANNULATION:  none                        RISK DUE TO:  none                        INTERVENTION/S IN PLACE IS/ARE:  n/a       NOTE / PLAN:     -continue current treatment therapies  -encourage DB& Cough.

## 2021-09-26 LAB
POTASSIUM BLD-SCNC: 3.6 MMOL/L (ref 3.5–5)
SARS-COV-2 RNA RESP QL NAA+PROBE: NEGATIVE

## 2021-09-26 PROCEDURE — 250N000013 HC RX MED GY IP 250 OP 250 PS 637: Performed by: INTERNAL MEDICINE

## 2021-09-26 PROCEDURE — 250N000011 HC RX IP 250 OP 636

## 2021-09-26 PROCEDURE — 250N000013 HC RX MED GY IP 250 OP 250 PS 637: Performed by: HOSPITALIST

## 2021-09-26 PROCEDURE — 250N000009 HC RX 250: Performed by: INTERNAL MEDICINE

## 2021-09-26 PROCEDURE — 250N000013 HC RX MED GY IP 250 OP 250 PS 637: Performed by: NURSE PRACTITIONER

## 2021-09-26 PROCEDURE — 999N000157 HC STATISTIC RCP TIME EA 10 MIN

## 2021-09-26 PROCEDURE — 250N000013 HC RX MED GY IP 250 OP 250 PS 637

## 2021-09-26 PROCEDURE — 84132 ASSAY OF SERUM POTASSIUM: CPT | Performed by: INTERNAL MEDICINE

## 2021-09-26 PROCEDURE — 999N000123 HC STATISTIC OXYGEN O2DAILY TECH TIME

## 2021-09-26 PROCEDURE — 94640 AIRWAY INHALATION TREATMENT: CPT

## 2021-09-26 PROCEDURE — 999N000009 HC STATISTIC AIRWAY CARE

## 2021-09-26 PROCEDURE — 120N000001 HC R&B MED SURG/OB

## 2021-09-26 PROCEDURE — 99232 SBSQ HOSP IP/OBS MODERATE 35: CPT | Performed by: INTERNAL MEDICINE

## 2021-09-26 PROCEDURE — 94640 AIRWAY INHALATION TREATMENT: CPT | Mod: 76

## 2021-09-26 PROCEDURE — 36415 COLL VENOUS BLD VENIPUNCTURE: CPT | Performed by: INTERNAL MEDICINE

## 2021-09-26 RX ADMIN — ACETYLCYSTEINE 2 ML: 200 SOLUTION ORAL; RESPIRATORY (INHALATION) at 07:34

## 2021-09-26 RX ADMIN — HYDROCORTISONE: 1 CREAM TOPICAL at 08:08

## 2021-09-26 RX ADMIN — HEPARIN SODIUM 5000 UNITS: 5000 INJECTION, SOLUTION INTRAVENOUS; SUBCUTANEOUS at 21:10

## 2021-09-26 RX ADMIN — ANORECTAL OINTMENT: 15.7; .44; 24; 20.6 OINTMENT TOPICAL at 13:56

## 2021-09-26 RX ADMIN — HEPARIN SODIUM 5000 UNITS: 5000 INJECTION, SOLUTION INTRAVENOUS; SUBCUTANEOUS at 13:55

## 2021-09-26 RX ADMIN — AMLODIPINE BESYLATE 10 MG: 10 TABLET ORAL at 08:08

## 2021-09-26 RX ADMIN — HEPARIN SODIUM 5000 UNITS: 5000 INJECTION, SOLUTION INTRAVENOUS; SUBCUTANEOUS at 06:43

## 2021-09-26 RX ADMIN — IPRATROPIUM BROMIDE AND ALBUTEROL SULFATE 3 ML: 2.5; .5 SOLUTION RESPIRATORY (INHALATION) at 21:02

## 2021-09-26 RX ADMIN — VENLAFAXINE 50 MG: 25 TABLET ORAL at 13:54

## 2021-09-26 RX ADMIN — Medication 2 PACKET: at 21:10

## 2021-09-26 RX ADMIN — ANORECTAL OINTMENT: 15.7; .44; 24; 20.6 OINTMENT TOPICAL at 08:08

## 2021-09-26 RX ADMIN — Medication 2 PACKET: at 08:08

## 2021-09-26 RX ADMIN — FAMOTIDINE 20 MG: 20 TABLET, FILM COATED ORAL at 08:08

## 2021-09-26 RX ADMIN — ACETYLCYSTEINE 2 ML: 200 SOLUTION ORAL; RESPIRATORY (INHALATION) at 21:02

## 2021-09-26 RX ADMIN — METHYLPHENIDATE HYDROCHLORIDE 10 MG: 5 TABLET ORAL at 08:07

## 2021-09-26 RX ADMIN — MIRTAZAPINE 15 MG: 15 TABLET, FILM COATED ORAL at 21:10

## 2021-09-26 RX ADMIN — FAMOTIDINE 20 MG: 20 TABLET, FILM COATED ORAL at 21:10

## 2021-09-26 RX ADMIN — Medication 2 PACKET: at 13:55

## 2021-09-26 RX ADMIN — VENLAFAXINE 50 MG: 25 TABLET ORAL at 06:43

## 2021-09-26 RX ADMIN — ANORECTAL OINTMENT: 15.7; .44; 24; 20.6 OINTMENT TOPICAL at 21:17

## 2021-09-26 RX ADMIN — VENLAFAXINE 50 MG: 25 TABLET ORAL at 17:16

## 2021-09-26 RX ADMIN — METHYLPHENIDATE HYDROCHLORIDE 5 MG: 5 TABLET ORAL at 13:54

## 2021-09-26 RX ADMIN — IPRATROPIUM BROMIDE AND ALBUTEROL SULFATE 3 ML: 2.5; .5 SOLUTION RESPIRATORY (INHALATION) at 07:34

## 2021-09-26 RX ADMIN — HYDROCORTISONE: 1 CREAM TOPICAL at 21:17

## 2021-09-26 RX ADMIN — ACETAMINOPHEN ORAL SOLUTION 650 MG: 650 SOLUTION ORAL at 10:26

## 2021-09-26 RX ADMIN — VENLAFAXINE 25 MG: 25 TABLET ORAL at 06:44

## 2021-09-26 NOTE — PLAN OF CARE
modProblem: Mechanical Ventilation  Goal: Patient will maintain patent airway  Outcome: Progressing  Goal: Tracheostomy will be managed safely  Outcome: Progressing    RT PROGRESS NOTE     DATA:     CURRENT SETTINGS:               TRACH TYPE / SIZE: #6 Bivona, placed 8/29/21             MODE:  capped   FIO2:    RA     ACTION:             THERAPIES: Duo/ Mucomyst neb BID given on scheduled time             SUCTION:                           FREQUENCY: 2                        AMOUNT: mod                        CONSISTENCY: thick                        COLOR:   white/ yellow             SPONTANEOUS COUGH EFFORT/STRENGTH OF EFFORT (not elicited by suctioning):  yes/ strong, swallow part of secretions                          WEANING PHASE:   3                        WEAN MODE: capped/ RA                        WEAN TIME: cont as tolerated                        END WEAN REASON:      RESPONSE:             BS: rhonchi on L side, decreased rhonchi on R side - clear decreased on R side, clear on L side after Sx              VITAL SIGNS: O2 sat 95-98%, HR 78-86, RR 20             EMOTIONAL NEEDS / CONCERNS:  None               RISK FOR SELF DECANNULATION:  Yes                        RISK DUE TO: Confusion                        INTERVENTION/S IN PLACE IS/ARE: nONE       NOTE / PLAN:  cont current plan of care - cap trach as tolerated.

## 2021-09-26 NOTE — PROGRESS NOTES
[facility]    Medicine Progress Note - Hospitalist Service       Date of Admission:  6/11/2021    Summary:  Dayron Neri is a 70 year male with h/o HTN who presented to ED on 5/15/2021 with acute SAH after a fall with unresponsiveness.  He underwent emergent left external ventricular drain placement.  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  On 5/28/2021, patient had persistent cerebral vasospasm, CT head showed bilateral BAYLEE infarct and was treated with milrinone and verapamil.  On 6/1/2021, angiogram showed no evidence of vasospasm.  Extubated on 6/3/2021.  6/6/2021 he was reintubated due to hypoxia.  On 6/7/2021, patient underwent tracheostomy and PEG tube placement.  EVD removed 6/9/2021.   Patient was treated for possible pneumonia with Unasyn on 5/20/2021, It was switched to ceftriaxone the next day for sputum culture growing Klebsiella. On 5/23, sputum culture also grew Pseudomonas. Antibiotic was switched to Zosyn.  Due to increased WBCs in CSF, possibility of ventriculitis was raised. CSF culture was negative. Antibiotic was switched to cefepime and vancomycin on 5/28/2021 for 2 weeks.  Vanco was discontinued on 6/9/2021. On 6/5/2021, sputum grew ESBL so cefepime was switched to meropenem. [facility]     Since admission to [facility], patient did not make significant progression for his neurology status. Trach weaning is also lack of progression due to his cognitive impairment. Per pulmonary, his trach will be chronic. Placement to nursing home pending.       Assessment & Plan           Acute on chronic hypoxemic respiratory failure: S/p treatment pseudomonas ESBL and Klebsiella pneumonia. Has excessive secretions. Weaning has been slow. Per pulmonary, his cognition prevents him from improving further. His pulmonary status could improve more if his cognition improves.   - Has been capped on room air  "for day and night since 8/31. Per pulmonary, no plan for decannulation due to high risk of aspiration and airway obstruction. Video swallow from 9/16 suggests no spontaneous cough response at or below the vocal chords resulting in silent aspiration. The trach is considered long term/permanent unless his swallow improves significantly.   - Continue trach care per RT and pulmonary.   - Airway secretion: Glycopyrrolate was tried and stopped. 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 CT head done on 7/1/21 shows resolution of previous multicompartment hemorrhage in the right frontal lobe and the ventricles. CT head repeated on 9/8, shows unchanged encephalomalacia in the right frontal, right temporal, and left paramedian parietal lobes.  - Need follow up with Neurosurgery Dr. Martinez in 3 months post op: A video visit was scheduled on 11/3. They will fax imaging requirements prior to the appointment.      Acute metabolic encephalopathy/cognitive impairment: His current neurology status is likely his new baseline due to frontal lobe injury.  Wife is still hopeful he will improve further but it is unclear if he has the capacity to focus and learn what they are attempting to teach him during therapy.  - Nuvigil started on 8/27 per neurology recommendation, but wife does not think it is helpful. Restarted Ritalin 9/12/21.   - Continue Effexor. Increased on 9/11/21 to 175 mg daily    Dysphagia: On tube feeds and NPO.   - Speech therapy following. Per note from 8/17/21, despite several weeks of working with patient there has been \"no functional improvement in patient's ability to manage his oropharyngeal secretions.\" Speech discussed this with wife. Intensive dysphagia treatment has been " discontinued. Repeated video swallow 9/16/2021and showed silent aspiration.    Severe malnutrition: has moderate/severe subcutaneous fat loss, moderate/severe muscle loss  - continuous tube feeding per dietician     Chronic kidney disease (CKD) stage 3a: Creatinine has been stable.    Anemia: hemoglobin stable. Continue to monitor.    Essential hypertension: BP controlled. 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 Sputum culture 6/19 showed MDR pseudomonas. On Tobramycin nebs 6/24/2021 - 7/9/2021.   - Continue pulmonary toilet, scheduled duonebs    Ventilator associated pneumonia: Completed antibiotics on July 18.    Severe debility, weakness, critical illness, deconditioning: Continue PT/OT    Hypercalcemia: unclear etiology, mild at this point. Parathyroid hormone normal. May be related to relative immobilization. Continue to monitor.    Insomnia: Continue nighttime mirtazapine 7.5 mg scheduled.    Left inguinal hernia: US on 6/22/21 shows large left inguinal hernia containing mesenteric fat and bowel, extending to the superior scrotum, no evidence of intratesticular mass. Currently has no acute issue and no intervention indicated.       Diet: Adult Formula Drip Feeding: Continuous Novasource Renal; Gastrostomy; Goal Rate: 65; mL/hr; Medication - Feeding Tube Flush Frequency: At least 15-30 mL water before and after medication administration and with tube clogging; Amount to Send (Nutri...    DVT Prophylaxis: Heparin SQ  Gutierrez Catheter: Not present  Central Lines: None  Code Status: Full Code      Disposition Plan   Expected discharge: placement to [facility] facility pending. Patient is accepted to [facility]. However, wife declined the placement.    The patient's care was discussed with the Bedside Nurse and Care Coordinator/.    Yazmin Hooper MD  Hospitalist Service  [facility]  Securely message with the Vocera Web  Console (learn more here)  Text page via [facility] Paging/Directory      ______________________________________________________________________    Interval History   Patient was sitting up in the chair today. His daughter talked to him over the video. Patient did not pay attention to his daughter. After his daughter was off the video, patient reports that he just talked to the head .   Per nursing staff, patient has thick secretion from the trach.     Physical Exam   Vital Signs: Temp: 97.7  F (36.5  C) Temp src: Oral BP: 138/83 Pulse: 79   Resp: 22 SpO2: 98 % O2 Device: None (Room air)    Weight: 162 lbs 6.4 oz    General appearance: not in acute distress  HEENT: PERRL, EOMI. Trach in place.  Lungs: Clear breath sounds in bilateral lung fields  Cardiovascular: Regular rate and rhythm, normal S1-S2  Abdomen: Soft, non tender, no distension. Left scrotal hernia, no pain on palpation  Musculoskeletal: No joint swelling  Skin: No rash and no edema  Neurology: Awake and alert. Confused. Mumble speech and hard to understand. Moves all four extremities.      Data   Recent Labs   Lab 09/26/21  0640 09/25/21  1940 09/25/21  0656 09/21/21  0625 09/20/21  0701   WBC  --   --   --   --  7.9   HGB  --   --   --   --  10.6*   MCV  --   --   --   --  96   PLT  --   --   --   --  351   NA  --   --   --   --  141   POTASSIUM 3.6 3.6 3.4*   < > 3.5   CHLORIDE  --   --   --   --  103   CO2  --   --   --   --  27   BUN  --   --   --   --  32*   CR  --   --   --   --  0.66*   ANIONGAP  --   --   --   --  11   RAGINI  --   --   --   --  10.5   GLC  --   --   --   --  124    < > = values in this interval not displayed.       Restraint:    Patient pulls his IV line and trach.     Within an hour after restraint an in person face to face assessment was completed, including an evaluation of the patient's immediate reaction to the intervention, behavioral assessment and review/assessment of history, drugs and medications, recent labs, etc.,  and behavioral condition.  The patient experienced: No adverse physical outcome from seclusion/restraint initiation.  The intervention of restraint or seclusion needs to continue.

## 2021-09-26 NOTE — PLAN OF CARE
Problem: Adult Inpatient Plan of Care  Goal: Absence of Hospital-Acquired Illness or Injury  Intervention: Prevent Skin Injury  Recent Flowsheet Documentation  Taken 9/25/2021 2151 by Laverne Biswas, RN  Body Position:   turned   right  Taken 9/25/2021 1644 by Laverne Biswas, RN  Body Position:   turned   left

## 2021-09-26 NOTE — PLAN OF CARE
Problem: Communication Impairment (Artificial Airway)  Goal: Effective Communication  Outcome: Improving     Problem: Device-Related Complication Risk (Artificial Airway)  Goal: Optimal Device Function  Outcome: Improving     Problem: Skin and Tissue Injury (Artificial Airway)  Goal: Absence of Device-Related Skin or Tissue Injury  Outcome: Improving   RT PROGRESS NOTE     DATA:     CURRENT SETTINGS:               TRACH TYPE / SIZE: #6 BIVONA TTS Changed on 8/29/21             MODE: Trach Capped              FIO2:  21%     ACTION:             THERAPIES: DuoNeb/ Mucomyst neb BID                 SUCTION:                           FREQUENCY: x1                        AMOUNT: small                         CONSISTENCY:thick                         COLOR:  small             SPONTANEOUS COUGH EFFORT/STRENGTH OF EFFORT (not elicited by suctioning): Fair                               WEANING PHASE: 3                        WEAN MODE: Trach Capped                         WEAN TIME:  as tolerated                        END WEAN REASON:      RESPONSE:             BS :Clear diminished             VITAL SIGNS: Sat 96-98%, HR 85-86RR 18-22             EMOTIONAL NEEDS / CONCERNS:confused               RISK FOR SELF DECANNULATION: no                        RISK DUE TO:                          INTERVENTION/S IN PLACE IS/ARE:      NOTE / PLAN: Pt occasionally desats to mid 80s during a sleep. Otherwise no respiratory distress noted. Continue current POC

## 2021-09-26 NOTE — PLAN OF CARE
Problem: Pain Chronic (Persistent)  Goal: Acceptable Pain Control and Functional Ability  Outcome: Improving   Pt denied pain during the shift, he received due meds and was repositioned every 2 hours. Pt slept most of the night.

## 2021-09-27 ENCOUNTER — APPOINTMENT (OUTPATIENT)
Dept: SPEECH THERAPY | Facility: CLINIC | Age: 71
DRG: 207 | End: 2021-09-27
Attending: HOSPITALIST
Payer: COMMERCIAL

## 2021-09-27 VITALS
TEMPERATURE: 97.9 F | RESPIRATION RATE: 20 BRPM | HEIGHT: 71 IN | WEIGHT: 162.4 LBS | BODY MASS INDEX: 22.73 KG/M2 | HEART RATE: 87 BPM | DIASTOLIC BLOOD PRESSURE: 85 MMHG | OXYGEN SATURATION: 98 % | SYSTOLIC BLOOD PRESSURE: 143 MMHG

## 2021-09-27 PROBLEM — Z93.0 TRACHEOSTOMY IN PLACE (H): Status: ACTIVE | Noted: 2021-09-27

## 2021-09-27 PROBLEM — Z78.9 ON TUBE FEEDING DIET: Status: ACTIVE | Noted: 2021-09-27

## 2021-09-27 LAB
ANION GAP SERPL CALCULATED.3IONS-SCNC: 9 MMOL/L (ref 5–18)
BASOPHILS # BLD AUTO: 0 10E3/UL (ref 0–0.2)
BASOPHILS NFR BLD AUTO: 1 %
BUN SERPL-MCNC: 30 MG/DL (ref 8–22)
CALCIUM SERPL-MCNC: 10.1 MG/DL (ref 8.5–10.5)
CHLORIDE BLD-SCNC: 105 MMOL/L (ref 98–107)
CO2 SERPL-SCNC: 27 MMOL/L (ref 22–31)
CREAT SERPL-MCNC: 0.72 MG/DL (ref 0.7–1.3)
EOSINOPHIL # BLD AUTO: 0.1 10E3/UL (ref 0–0.7)
EOSINOPHIL NFR BLD AUTO: 2 %
ERYTHROCYTE [DISTWIDTH] IN BLOOD BY AUTOMATED COUNT: 13.2 % (ref 10–15)
GFR SERPL CREATININE-BSD FRML MDRD: >90 ML/MIN/1.73M2
GLUCOSE BLD-MCNC: 127 MG/DL (ref 70–125)
HCT VFR BLD AUTO: 30 % (ref 40–53)
HGB BLD-MCNC: 10.2 G/DL (ref 13.3–17.7)
IMM GRANULOCYTES # BLD: 0 10E3/UL
IMM GRANULOCYTES NFR BLD: 0 %
LYMPHOCYTES # BLD AUTO: 1.4 10E3/UL (ref 0.8–5.3)
LYMPHOCYTES NFR BLD AUTO: 17 %
MAGNESIUM SERPL-MCNC: 1.9 MG/DL (ref 1.8–2.6)
MCH RBC QN AUTO: 32.7 PG (ref 26.5–33)
MCHC RBC AUTO-ENTMCNC: 34 G/DL (ref 31.5–36.5)
MCV RBC AUTO: 96 FL (ref 78–100)
MONOCYTES # BLD AUTO: 0.7 10E3/UL (ref 0–1.3)
MONOCYTES NFR BLD AUTO: 9 %
NEUTROPHILS # BLD AUTO: 5.7 10E3/UL (ref 1.6–8.3)
NEUTROPHILS NFR BLD AUTO: 71 %
NRBC # BLD AUTO: 0 10E3/UL
NRBC BLD AUTO-RTO: 0 /100
PLATELET # BLD AUTO: 313 10E3/UL (ref 150–450)
POTASSIUM BLD-SCNC: 3.6 MMOL/L (ref 3.5–5)
RBC # BLD AUTO: 3.12 10E6/UL (ref 4.4–5.9)
SODIUM SERPL-SCNC: 141 MMOL/L (ref 136–145)
WBC # BLD AUTO: 7.9 10E3/UL (ref 4–11)

## 2021-09-27 PROCEDURE — 36415 COLL VENOUS BLD VENIPUNCTURE: CPT | Performed by: INTERNAL MEDICINE

## 2021-09-27 PROCEDURE — 250N000009 HC RX 250: Performed by: INTERNAL MEDICINE

## 2021-09-27 PROCEDURE — 250N000013 HC RX MED GY IP 250 OP 250 PS 637

## 2021-09-27 PROCEDURE — 82310 ASSAY OF CALCIUM: CPT | Performed by: INTERNAL MEDICINE

## 2021-09-27 PROCEDURE — 85025 COMPLETE CBC W/AUTO DIFF WBC: CPT | Performed by: NURSE PRACTITIONER

## 2021-09-27 PROCEDURE — 92507 TX SP LANG VOICE COMM INDIV: CPT | Mod: GN | Performed by: SPEECH-LANGUAGE PATHOLOGIST

## 2021-09-27 PROCEDURE — 999N000009 HC STATISTIC AIRWAY CARE

## 2021-09-27 PROCEDURE — 999N000157 HC STATISTIC RCP TIME EA 10 MIN

## 2021-09-27 PROCEDURE — 92526 ORAL FUNCTION THERAPY: CPT | Mod: GN | Performed by: SPEECH-LANGUAGE PATHOLOGIST

## 2021-09-27 PROCEDURE — 250N000013 HC RX MED GY IP 250 OP 250 PS 637: Performed by: INTERNAL MEDICINE

## 2021-09-27 PROCEDURE — 31600 PLANNED TRACHEOSTOMY: CPT

## 2021-09-27 PROCEDURE — 99239 HOSP IP/OBS DSCHRG MGMT >30: CPT | Performed by: INTERNAL MEDICINE

## 2021-09-27 PROCEDURE — 250N000011 HC RX IP 250 OP 636

## 2021-09-27 PROCEDURE — 250N000013 HC RX MED GY IP 250 OP 250 PS 637: Performed by: HOSPITALIST

## 2021-09-27 PROCEDURE — 250N000013 HC RX MED GY IP 250 OP 250 PS 637: Performed by: NURSE PRACTITIONER

## 2021-09-27 PROCEDURE — 999N000123 HC STATISTIC OXYGEN O2DAILY TECH TIME

## 2021-09-27 PROCEDURE — 83735 ASSAY OF MAGNESIUM: CPT | Performed by: INTERNAL MEDICINE

## 2021-09-27 PROCEDURE — 94640 AIRWAY INHALATION TREATMENT: CPT

## 2021-09-27 RX ORDER — METHYLPHENIDATE HYDROCHLORIDE 5 MG/1
5 TABLET ORAL DAILY
Qty: 10 TABLET | Refills: 0 | Status: SHIPPED | OUTPATIENT
Start: 2021-09-27

## 2021-09-27 RX ORDER — FAMOTIDINE 20 MG/1
20 TABLET, FILM COATED ORAL 2 TIMES DAILY
Start: 2021-09-27

## 2021-09-27 RX ORDER — VENLAFAXINE 50 MG/1
50 TABLET ORAL 3 TIMES DAILY
Start: 2021-09-27

## 2021-09-27 RX ORDER — VENLAFAXINE 25 MG/1
25 TABLET ORAL EVERY MORNING
Start: 2021-09-28 | End: 2021-09-27

## 2021-09-27 RX ORDER — METHYLPHENIDATE HYDROCHLORIDE 10 MG/1
10 TABLET ORAL EVERY MORNING
Refills: 0
Start: 2021-09-27 | End: 2021-09-27

## 2021-09-27 RX ORDER — SCOLOPAMINE TRANSDERMAL SYSTEM 1 MG/1
1 PATCH, EXTENDED RELEASE TRANSDERMAL
Start: 2021-09-27

## 2021-09-27 RX ORDER — BISACODYL 10 MG
10 SUPPOSITORY, RECTAL RECTAL DAILY PRN
Start: 2021-09-27

## 2021-09-27 RX ORDER — POLYETHYLENE GLYCOL 3350 17 G/17G
17 POWDER, FOR SOLUTION ORAL DAILY PRN
Start: 2021-09-27 | End: 2021-09-27

## 2021-09-27 RX ORDER — POLYETHYLENE GLYCOL 3350 17 G/17G
17 POWDER, FOR SOLUTION ORAL DAILY PRN
Start: 2021-09-27

## 2021-09-27 RX ORDER — LOPERAMIDE HCL 1 MG/7.5ML
1 SUSPENSION ORAL 3 TIMES DAILY PRN
Start: 2021-09-27 | End: 2021-09-27

## 2021-09-27 RX ORDER — METHYLPHENIDATE HYDROCHLORIDE 5 MG/1
5 TABLET ORAL DAILY
Refills: 0
Start: 2021-09-27 | End: 2021-09-27

## 2021-09-27 RX ORDER — AMLODIPINE BESYLATE 10 MG/1
10 TABLET ORAL DAILY
Start: 2021-09-28

## 2021-09-27 RX ORDER — IPRATROPIUM BROMIDE AND ALBUTEROL SULFATE 2.5; .5 MG/3ML; MG/3ML
3 SOLUTION RESPIRATORY (INHALATION) 2 TIMES DAILY
Refills: 0
Start: 2021-09-27

## 2021-09-27 RX ORDER — VENLAFAXINE 25 MG/1
25 TABLET ORAL EVERY MORNING
Refills: 0
Start: 2021-09-28

## 2021-09-27 RX ORDER — AMLODIPINE BESYLATE 10 MG/1
10 TABLET ORAL DAILY
Start: 2021-09-28 | End: 2021-09-27

## 2021-09-27 RX ORDER — METHYLPHENIDATE HYDROCHLORIDE 10 MG/1
10 TABLET ORAL EVERY MORNING
Qty: 10 TABLET | Refills: 0 | Status: SHIPPED | OUTPATIENT
Start: 2021-09-27

## 2021-09-27 RX ORDER — MIRTAZAPINE 15 MG/1
15 TABLET, FILM COATED ORAL AT BEDTIME
Start: 2021-09-27

## 2021-09-27 RX ORDER — VENLAFAXINE 50 MG/1
50 TABLET ORAL
Start: 2021-09-27 | End: 2021-09-27

## 2021-09-27 RX ORDER — LOPERAMIDE HCL 1 MG/7.5ML
1 SUSPENSION ORAL 3 TIMES DAILY PRN
Start: 2021-09-27

## 2021-09-27 RX ORDER — GUAR GUM
2 PACKET (EA) ORAL 3 TIMES DAILY
Start: 2021-09-27

## 2021-09-27 RX ORDER — FAMOTIDINE 20 MG/1
20 TABLET, FILM COATED ORAL 2 TIMES DAILY
Start: 2021-09-27 | End: 2021-09-27

## 2021-09-27 RX ORDER — ACETYLCYSTEINE 200 MG/ML
2 SOLUTION ORAL; RESPIRATORY (INHALATION) 2 TIMES DAILY
Start: 2021-09-27

## 2021-09-27 RX ADMIN — METHYLPHENIDATE HYDROCHLORIDE 5 MG: 5 TABLET ORAL at 12:38

## 2021-09-27 RX ADMIN — VENLAFAXINE 50 MG: 25 TABLET ORAL at 12:38

## 2021-09-27 RX ADMIN — IPRATROPIUM BROMIDE AND ALBUTEROL SULFATE 3 ML: 2.5; .5 SOLUTION RESPIRATORY (INHALATION) at 07:20

## 2021-09-27 RX ADMIN — ANORECTAL OINTMENT: 15.7; .44; 24; 20.6 OINTMENT TOPICAL at 13:51

## 2021-09-27 RX ADMIN — SCOPALAMINE 1 PATCH: 1 PATCH, EXTENDED RELEASE TRANSDERMAL at 13:51

## 2021-09-27 RX ADMIN — HEPARIN SODIUM 5000 UNITS: 5000 INJECTION, SOLUTION INTRAVENOUS; SUBCUTANEOUS at 05:47

## 2021-09-27 RX ADMIN — HYDROCORTISONE: 1 CREAM TOPICAL at 08:32

## 2021-09-27 RX ADMIN — ANORECTAL OINTMENT: 15.7; .44; 24; 20.6 OINTMENT TOPICAL at 08:32

## 2021-09-27 RX ADMIN — ACETYLCYSTEINE 2 ML: 200 SOLUTION ORAL; RESPIRATORY (INHALATION) at 07:20

## 2021-09-27 RX ADMIN — HEPARIN SODIUM 5000 UNITS: 5000 INJECTION, SOLUTION INTRAVENOUS; SUBCUTANEOUS at 13:50

## 2021-09-27 RX ADMIN — Medication 2 PACKET: at 13:50

## 2021-09-27 RX ADMIN — FAMOTIDINE 20 MG: 20 TABLET, FILM COATED ORAL at 08:31

## 2021-09-27 RX ADMIN — AMLODIPINE BESYLATE 10 MG: 10 TABLET ORAL at 08:31

## 2021-09-27 RX ADMIN — VENLAFAXINE 25 MG: 25 TABLET ORAL at 05:47

## 2021-09-27 RX ADMIN — Medication 2 PACKET: at 08:31

## 2021-09-27 RX ADMIN — VENLAFAXINE 50 MG: 25 TABLET ORAL at 05:48

## 2021-09-27 RX ADMIN — METHYLPHENIDATE HYDROCHLORIDE 10 MG: 5 TABLET ORAL at 08:31

## 2021-09-27 NOTE — PLAN OF CARE
Speech Language Therapy Discharge Summary    Reason for therapy discharge:    Discharged to long term care facility.    Progress towards therapy goal(s). See goals on Care Plan in [facility] electronic health record for goal details.  Goals not met.  Barriers to achieving goals:   limited tolerance for therapy and discharge from facility.    Therapy recommendation(s):    Continued therapy is recommended.  Rationale/Recommendations:  Targeting attention/concentration and initiation in setting of swallowing, voicing and general interaction. May benefit from pharmacological intervention for possible hallucinations

## 2021-09-27 NOTE — PLAN OF CARE
Problem: Device-Related Complication Risk (Artificial Airway)  Goal: Optimal Device Function  Outcome: No Change     Problem: Skin and Tissue Injury (Artificial Airway)  Goal: Absence of Device-Related Skin or Tissue Injury  Outcome: No Change     Problem: Communication Impairment (Artificial Airway)  Goal: Effective Communication  Outcome: No Change     Respiratory Care Progress Note    Airway/oxygen:    Current settings: capped, room air  Trach type/size: #6 placed on 8/29/2021    Actions:    Medications/therapies: Duoneb BID, Mucomyst BID  Overnight suctioning   Frequency: 2x   Amount: large   Consistency: thick   Color: white   Spontaneous effort: good, strong    Weaning phase: 3  Daytime plan: capped, room air  Nighttime plan: capped, room air    Response    Breath sounds: diminished  Vital signs: Temp: 97.8  F (36.6  C) Temp src: Oral BP: 122/79 Pulse: 84   Resp: 18 SpO2: 95 % O2 Device: None (Room air)    Risk for self decannulation: no    Note/Plan: continue plan of care

## 2021-09-27 NOTE — PROGRESS NOTES
Pulmonary brief update    Reviewed clinical status with HMS, RT, social work.  Plan is to discharge today to nursing home with permanent trach in place.  No change in clinical status  Pulmonary will sign off at this time. Please call with additional questions.    Isael (Bobby) MD Lucinda  [facility] Pulmonary & Critical [facility]  Pager [phone]  Clinic [phone]  Fax [phone]

## 2021-09-27 NOTE — PLAN OF CARE
Patient was up on the chair and tolerated. Watched the foot ball game with wife on the ipad. Tylenol given this morning for comfort per wife request. Will continue to monitor.

## 2021-09-27 NOTE — PROGRESS NOTES
Social Work Note:    Multiple calls with spouse today.  We have discussed and finalized the discharge today to [facility]. Writer confirmed with Roman @ [facility] that patient has been accepted and transport is at 14:30.  Writer informed spouse of the transportation through [facility] @ 14:30.  Writer spoke spouse about stretcher transport, and that their is no guarantee insurance will cover the cost.    PAS# [id number]    Dayron Chen, [facility] [place]  [phone]

## 2021-09-27 NOTE — PROGRESS NOTES
Care Management Discharge Note    Discharge Date: 09/27/2021  Expected Time of Departure:  (14:30)    Discharge Disposition: [facility] Care-[facility]    Discharge Services: None.      Discharge DME:  (Trach/suction)    Discharge Transportation: health plan transportation.  [facility] transport @ 14:30    Private pay costs discussed: private room/amenity fees and transportation costs    PAS Confirmation Code: [id number]  Patient/family educated on Medicare website which has current facility and service quality ratings: yes    Education Provided on the Discharge Plan:  yes  Persons Notified of Discharge Plans: Susy-spouse.  Multiple calls today with spouse.    Patient/Family in Agreement with the Plan: yes    Handoff Referral Completed: Yes    Additional Information:    Multiple calls with spouse today.  We have discussed and finalized the discharge today to [facility]. Writer confirmed with [facility] Kirstin Tyler that patient has been accepted and transport is at 14:30.  Writer informed spouse of the transportation through [facility] @ 14:30.    PAS# [id number]  Unit Charge nurse asked to send 2 days of tube formula and new trach with patient to the SNF.      Dayron Chen [facility]  [facility] [place]  [phone]          FADIA Combs

## 2021-09-27 NOTE — PROGRESS NOTES
Informed by social work that patient will be discharged to nursing home today.     Patient was seen and examined this morning. His daughter and wife were watching him via the video. Patient appears stable at his usual state. Wife is very upset that patient is discharged today. Wife complained about the staff not trying hard enough to wean patient off the trach. I explained to her about all the efforts that staff have tried during patient's prolonged stay. I explained to wife about post discharge follow up with nursing home physician, neurology and neurosurgery. Wife states that she wants to find better doctors.       Plan to discharge at 2:30 pm this afternoon.      [facility] Medicine Service  Yazmin Hooper MD

## 2021-09-27 NOTE — DISCHARGE SUMMARY
LTACH  Hospitalist Discharge Summary      Date of Admission:  6/11/2021  Date of Discharge:  9/27/2021  Discharging Provider: Yazmin Hooper MD      Discharge Diagnoses   Principal Problem:    Acute respiratory failure with hypoxia and hypercapnia (H)  Active Problems:    Essential hypertension    Subarachnoid hemorrhage due to ruptured aneurysm (H)    Cognitive impairment    Other dysphagia    Left-sided neglect    History of MDR Pseudomonas aeruginosa infection    Stage 3a chronic kidney disease    Tracheostomy in place (H)    On tube feeding diet    Follow-ups Needed After Discharge   Follow-up Appointments     Follow Up and recommended labs and tests      Follow up with Nursing home physician. Also follow up with neurology in   2-4 weeks.         Follow up with neurosurgery    Discharge Disposition   Discharged to nursing home  Condition at discharge: Stable      Hospital Course     Dayron Neri is a 70 year male with a medical history of hypertension. He presented to ED on 5/15/2021 with unresponsiveness after a fall.  He was found to have posterior communicating aneurysm rupture. He underwent craniotomy with clipping of PCOM aneurysm.  Hospital course was complicated by bilateral BAYLEE infarct and pneumonia.  Patient eventually needed tracheostomy and PEG tube placement on 6/7/2021. He was transferred to [facility] on 6/11/21.     Since admission to [facility], patient did not make significant progression for his neurology status despite prolonged stay. He was able to get weaned off the ventilator. However, further tracheostomy weaning is lack of progression due to his cognitive impairment and failure to manage his secretion. Per pulmonary, his tracheostomy will be chronic. CT head on 7/1/21 shows resolution of previous multicompartment hemorrhage in the right frontal lobe and the ventricles. CT head repeated again on 9/8/21, which shows unchanged encephalomalacia in the right frontal, right temporal, and left paramedian  parietal lobes. Nuvigil was tried per neurology recommendation, but family does not think it is helpful so that it was switched to Ritalin. Patient has been having tube feed via PEG. Speech therapy has been working with patient. Despite many weeks of effort,  there has been no improvement in patient's oropharyngeal function. Video swallow evaluation was repeated on 9/16/2021and it shows silent aspiration. Patient needs chronic tube feed. Patient has chronic kidney disease stage 3a and his creatinine has been stable. His blood pressure is well controlled on lisinopril. Patient is discharged to nursing facility in a stable condition on 9/27/2021. Patient is recommended to follow up with nursing home physician, neurology and neurosurgery.     A video appointment with Neurosurgery Dr. Martinez was scheduled on 11/3.       Consultations This Hospital Stay   PHYSICAL THERAPY ADULT IP CONSULT  OCCUPATIONAL THERAPY ADULT IP CONSULT  SPEECH LANGUAGE PATH ADULT IP CONSULT  SPEECH LANGUAGE PATH ADULT IP CONSULT  PHARMACY IP CONSULT  PHYSICAL THERAPY ADULT IP CONSULT  PHYSICAL THERAPY ADULT IP CONSULT  PHYSICAL THERAPY ADULT IP CONSULT  SPEECH LANGUAGE PATH ADULT IP CONSULT  SPEECH LANGUAGE PATH ADULT IP CONSULT  SPEECH LANGUAGE PATH ADULT IP CONSULT  NEUROLOGY IP CONSULT  PHARMACY IP CONSULT  PHARMACY IP CONSULT  NEPHROLOGY IP CONSULT  WOUND OSTOMY CONTINENCE NURSE  IP CONSULT  PSYCHIATRY IP CONSULT  NUTRITION SERVICES ADULT IP CONSULT  NEUROLOGY IP CONSULT  NEUROLOGY IP CONSULT  NEUROSURGERY IP CONSULT  PHYSICAL THERAPY ADULT IP CONSULT  OCCUPATIONAL THERAPY ADULT IP CONSULT  SPEECH LANGUAGE PATH ADULT IP CONSULT    Code Status   Full Code    Time Spent on this Encounter   I, Yazmin Hooper MD, personally saw the patient today and spent greater than 30 minutes discharging this patient.       Yazmin Hooper MD  [facility] [address] [place] [address]  Phone: [phone]  Fax:  [phone]  ______________________________________________________________________    Physical Exam   Vital Signs: Temp: 97.9  F (36.6  C) Temp src: Axillary BP: (!) 143/85 Pulse: 101   Resp: 18 SpO2: 94 % O2 Device: None (Room air)    Weight: 162 lbs 6.4 oz    General appearance: not in acute distress  HEENT: PERRL, EOMI. Tracheostomy in place, insertion site has no erythema or swelling.  Lungs: Clear breath sounds in bilateral lung fields  Cardiovascular: Regular rate and rhythm, normal S1-S2  Abdomen: Soft, non tender, no distension. Left scrotal hernia, no pain on palpation. PEG in place, insertion site has no erythema or swelling  Musculoskeletal: No joint swelling  Skin: No rash and no edema  Neurology: Awake and alert. Confused. Mumble speech and hard to understand. Moves all four extremities.       Primary Care Physician   Physician No Ref-Primary    Discharge Orders      General info for SNF    Length of Stay Estimate: Long Term Care  Condition at Discharge: Stable  Level of care:board and care  Rehabilitation Potential: Poor  Admission H&P remains valid and up-to-date: Yes  Recent Chemotherapy: N/A  Use Nursing Home Standing Orders: Yes     Follow Up and recommended labs and tests    Follow up with Nursing home physician. Also follow up with neurology in 2-4 weeks.     Reason for your hospital stay    Had acute stroke. Admitted for respiratory failure, ventilator and tracheostomy weaning.     Activity - Up with nursing assistance     Tracheostomy care by nursing    Standard Cares     Full Code     Physical Therapy Adult Consult    Evaluate and treat as clinically indicated.    Reason:  Generalized weakness     Occupational Therapy Adult Consult    Evaluate and treat as clinically indicated.    Reason:  Generalized weakness     Speech Language Path Adult Consult    Evaluate and treat as clinically indicated.    Reason:  slurred speech     Adult Formula Tube Feeding    Follow this regimen upon discharge:  Orders Placed This Encounter      Adult Formula Drip Feeding: Continuous Novasource Renal; Gastrostomy; Goal Rate: 65; mL/hr; Medication - Feeding Tube Flush Frequency: At least 15-30 mL water before and after medication administration and with tube clogging; Amount to Send (Nutri...     Fall precautions     Tracheostomy and Supplies    Trach/Supplies Documentation:   Patient has an open surgical tracheostomy. The tracheostomy has been or is expected to remain open for at least three (3) months. The patient requires the ordered tracheostomy supplies to provide appropriate routine tracheostomy care.     I, the undersigned, certify that the above prescribed supplies are medically necessary for this patient and is both reasonable and necessary in reference to accepted standards of medical and necessary in reference to accepted standards of medical practice in the treatment of this patient's condition and is not prescribed as a convenience.        Significant Results and Procedures   Most Recent 3 CBC's:Recent Labs   Lab Test 09/27/21  0741 09/20/21  0701 09/13/21  0704   WBC 7.9 7.9 9.4   HGB 10.2* 10.6* 10.9*   MCV 96 96 97    351 338     Most Recent 3 BMP's:Recent Labs   Lab Test 09/27/21  0741 09/26/21  0640 09/25/21  1940 09/21/21  0625 09/20/21  0701 09/13/21  1915 09/13/21  0704     --   --   --  141  --  145   POTASSIUM 3.6 3.6 3.6   < > 3.5   < > 3.4*   CHLORIDE 105  --   --   --  103  --  105   CO2 27  --   --   --  27  --  29   BUN 30*  --   --   --  32*  --  37*   CR 0.72  --   --   --  0.66*  --  0.80   ANIONGAP 9  --   --   --  11  --  11   RAGINI 10.1  --   --   --  10.5  --  10.9*   *  --   --   --  124  --  120    < > = values in this interval not displayed.       XR chest: 7/26/2021    IMPRESSION: Midline tracheostomy. Normal heart size. Probable mild left basilar scar. No acute left lung infiltrate. There is a right lung base opacity, increased from previous which could represent  worsening pneumonia or atelectasis.    CT head without contrast: 7/1/2021    FINDINGS:  INTRACRANIAL CONTENTS: Since prior study, temporal evolution of multicompartment intracranial hemorrhage with near complete resolution of layering hemorrhage within the lateral ventricles and of extra-axial fluid deep to the right frontal craniotomy   site. Some dural thickening and/or hyperdense blood products persist extending to the right middle cranial fossa as before. No new or progressive hemorrhage. Low-density changes with sulcal effacement/mass effect redemonstrated in the right frontal lobe.   Suggested area of intraparenchymal hemorrhage on previous study appears to have resolved. The degree of mass effect overall is similar to prior study with overall stable/unchanged ventricular caliber, including dilation of the lateral and third   ventricles. Volume loss in the right middle cranial fossa/right temporal lobe redemonstrated. Probable evolving subacute phase nonhemorrhagic ischemia in the right frontal lobe and paramedian posterior left frontal level. No definite new areas of acute   ischemic involvement are identified. Underlying chronic ischemic changes are identified in the deep white matter of both cerebral hemispheres estimated mild to moderate. Position of cerebellar tonsils is satisfactory. Sella appears unremarkable.     VISUALIZED ORBITS/SINUSES/MASTOIDS: No acute orbital process. Mild membrane thickening in the sphenoid sinus. No middle ear or mastoid effusion.     BONES/SOFT TISSUES: Postoperative changes of right frontotemporal craniotomy. Right parasellar aneurysm clip. Mild motion artifact degrades image quality. No significant swelling of the facial or scalp tissues is evident.     IMPRESSION:  1.  Temporal evolution and subtotal resolution of previously identified multicompartment hyperdense blood products. Specifically, subtotal resolution of intraparenchymal hemorrhage deep white matter right frontal  lobe, intraventricular hemorrhage and of extra-axial blood products overlying the right frontotemporal level deep to the craniotomy site. No new or progressive hemorrhage is identified.   2.  Temporal evolution of presumed subacute phase nonhemorrhagic ischemic change in the right frontal lobe and posterior left frontal paramedian region. No new or progressive areas of recent ischemia are suggested.   3.  Stable ventricular caliber.   4.  Additional details above.      CT head without contrast: 9/8/2021    FINDINGS:   INTRACRANIAL CONTENTS: No acute intracranial hemorrhage. Redemonstrated encephalomalacia in the right frontal, right temporal, and left paramedian parietal lobes. Right MCA aneurysm clipping. No CT evidence of acute infarct. Sequelae of mild chronic   microangiopathy. Mild cerebral volume loss without hydrocephalus. No extra-axial fluid collections.  Patent basal cisterns.      VISUALIZED ORBITS/SINUSES/MASTOIDS: The orbits are unremarkable. The visualized paranasal sinuses and temporal bone structures are well-aerated.      BONES/SOFT TISSUES: Right pterional craniotomy, otherwise the calvarium and skull base are unremarkable.                                                                     IMPRESSION:      1. Status post right MCA aneurysm clipping without acute intracranial abnormality.   2. Unchanged encephalomalacia in the right frontal, right temporal, and left paramedian parietal lobes.      US scrotum and testicles with duplex: 6/22/2021    FINDINGS:     RIGHT: Right testicle measures 4.7 x 2.2 x 2.4 cm. Normal testicle with no masses. Normal arterial duplex and normal color flow. 4 mm cyst right epididymal head. No hydrocele. No varicocele.     LEFT: Left testicle measures 4.9 x 2.6 x 3.2 cm. Normal testicle with no masses. Normal arterial duplex and normal color flow. Normal epididymis. No hydrocele. No varicocele. Large left inguinal hernia containing mesenteric fat and bowel, extending  into the left superior scrotum.     IMPRESSION:  1.  Large left inguinal hernia containing mesenteric fat and bowel, extending to the superior scrotum.  2.  No evidence of intratesticular mass.  3.  Incidental tiny right epididymal head cyst.      Discharge Medications   Current Discharge Medication List      START taking these medications    Details   acetaminophen (TYLENOL) 32 mg/mL liquid 20.3 mLs (650 mg) by Oral or Feeding Tube route every 4 hours as needed for mild pain or fever  Refills: 0    Associated Diagnoses: Subarachnoid hemorrhage due to ruptured aneurysm (H)      acetylcysteine (MUCOMYST) 20 % neb solution Take 2 mLs by nebulization 2 times daily    Associated Diagnoses: Subarachnoid hemorrhage due to ruptured aneurysm (H)      amLODIPine (NORVASC) 10 MG tablet 1 tablet (10 mg) by Oral or Feeding Tube route daily    Associated Diagnoses: Essential hypertension      docusate (COLACE) 50 MG/5ML liquid 10 mLs (100 mg) by Per Feeding Tube route 2 times daily as needed for constipation    Associated Diagnoses: On tube feeding diet      Guar Gum (FIBER MODULAR, NUTRISOURCE FIBER,) packet 2 packets by Per G Tube route 3 times daily    Associated Diagnoses: On tube feeding diet      loperamide (IMODIUM) 1 MG/7.5ML liquid 7.5 mLs (1 mg) by Oral or Feeding Tube route 3 times daily as needed for diarrhea    Associated Diagnoses: On tube feeding diet      magnesium hydroxide (MILK OF MAGNESIA) 400 MG/5ML suspension 30 mLs by Oral or Feeding Tube route daily as needed for constipation    Associated Diagnoses: On tube feeding diet      menthol-zinc oxide (CALMOSEPTINE) 0.44-20.6 % OINT ointment Apply topically 2 times daily as needed for skin protection or irritation Apply to perianal area    Associated Diagnoses: Rash and nonspecific skin eruption      !! methylphenidate (RITALIN) 10 MG tablet 1 tablet (10 mg) by Oral or Feeding Tube route every morning  Refills: 0    Associated Diagnoses: Subarachnoid hemorrhage  due to ruptured aneurysm (H)      !! methylphenidate (RITALIN) 5 MG tablet 1 tablet (5 mg) by Oral or Feeding Tube route daily Give daily at noon time  Refills: 0    Associated Diagnoses: Subarachnoid hemorrhage due to ruptured aneurysm (H)      mirtazapine (REMERON) 15 MG tablet 1 tablet (15 mg) by Per Feeding Tube route At Bedtime    Associated Diagnoses: Subarachnoid hemorrhage due to ruptured aneurysm (H)      polyethylene glycol (MIRALAX) 17 g packet 17 g by Oral or Feeding Tube route daily as needed for constipation    Associated Diagnoses: On tube feeding diet      scopolamine (TRANSDERM) 1 MG/3DAYS 72 hr patch Place 1 patch onto the skin every 72 hours    Associated Diagnoses: 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 venlafaxine (EFFEXOR) 25 MG tablet 1 tablet (25 mg) by Per Feeding Tube route every morning    Associated Diagnoses: Subarachnoid hemorrhage due to ruptured aneurysm (H)      !! venlafaxine (EFFEXOR) 50 MG tablet 1 tablet (50 mg) by Per Feeding Tube route 3 times daily (with meals)    Associated Diagnoses: Subarachnoid hemorrhage due to ruptured aneurysm (H)       !! - Potential duplicate medications found. Please discuss with provider.      CONTINUE these medications which have CHANGED    Details   bisacodyl (DULCOLAX) 10 MG suppository Place 1 suppository (10 mg) rectally daily as needed for constipation    Associated Diagnoses: On tube feeding diet      famotidine (PEPCID) 20 MG tablet 1 tablet (20 mg) by Oral or Feeding Tube route 2 times daily    Associated Diagnoses: On tube feeding diet      ipratropium - albuterol 0.5 mg/2.5 mg/3 mL (DUONEB) 0.5-2.5 (3) MG/3ML neb solution Take 1 vial (3 mLs) by nebulization 2 times daily  Refills: 0    Associated Diagnoses: Subarachnoid hemorrhage due to ruptured aneurysm (H)         CONTINUE these medications which have NOT CHANGED    Details   protein modular (PROSOURCE TF) LIQD 1 packet by Per Feeding Tube route 3 times daily    Comments:  supplement  Associated Diagnoses: Ventilator dependence (H); Subarachnoid hemorrhage due to ruptured aneurysm (H)         STOP taking these medications       acetaminophen (TYLENOL) 325 MG tablet Comments:   Reason for Stopping:         Heparin Sodium, Porcine, (HEPARIN ANTICOAGULANT) 5000 UNIT/0.5ML injection Comments:   Reason for Stopping:         insulin aspart (NOVOLOG PEN) 100 UNIT/ML pen Comments:   Reason for Stopping:         lisinopril (ZESTRIL) 10 MG tablet Comments:   Reason for Stopping:         nystatin (MYCOSTATIN) 441195 UNIT/ML suspension Comments:   Reason for Stopping:         potassium & sodium phosphates (NEUTRA-PHOS) 280-160-250 MG Packet Comments:   Reason for Stopping:         potassium chloride (KLOR-CON) 20 MEQ packet Comments:   Reason for Stopping:         senna-docusate (SENOKOT-S/PERICOLACE) 8.6-50 MG tablet Comments:   Reason for Stopping:             Allergies   No Known Allergies

## 2021-09-27 NOTE — PLAN OF CARE
Physical Therapy Discharge Summary    Reason for therapy discharge:    Discharged to long term care facility.    Progress towards therapy goal(s). See goals on Care Plan in [facility] electronic health record for goal details.  Goals not met.  Barriers to achieving goals:   discharge from facility.    Therapy recommendation(s):    Continued therapy is recommended.  Rationale/Recommendations:  Pt is highly distractable and cognitive impairments limit functional mobility. Pt is Max A x 2 to stand and pivot transfers. Pt able to sit on EOB without UE support for less than or equal to 30 seconds before falling posterior and to the Right. Pt lacks awareness and demonstrates no self correction.

## 2021-09-27 NOTE — PLAN OF CARE
Problem: Anxiety  Goal: Anxiety Reduction or Resolution  Outcome: Improving     Problem: Skin and Tissue Injury (Artificial Airway)  Goal: Absence of Device-Related Skin or Tissue Injury  Outcome: Improving     Problem: Pain Chronic (Persistent)  Goal: Acceptable Pain Control and Functional Ability  Outcome: Improving   Pt had an uneventful shift. Alert and no signs of pain noted. His vital signs were also fine tonight. All his scheduled medications on this shift were administered and cares done. Inspected his skin tonight and no new skin problems noted. Looks comfortable, Slept fine; will continue to monitor. Dayron Austin RN.

## 2021-09-27 NOTE — DISCHARGE INSTRUCTIONS
Dayron has a video visit with Dr. Martinez from [facility] Neurosurgery clinic 11/3 at 1420 ([phone]). They will contact [facility] to facilitate set up.

## 2021-09-28 NOTE — PLAN OF CARE
Occupational Therapy Discharge Summary    Reason for therapy discharge:    Discharged to transitional care facility.    Progress towards therapy goal(s). See goals on Care Plan in [facility] electronic health record for goal details.  Goals not met.  Barriers to achieving goals:   limited tolerance for therapy.    Therapy recommendation(s):    Pt would benefit from a maintenance therapy to continue strength that he has gained at LTACH. Pt is limited from further progress due to cognitive impairment/confusion. Likely at new functional baseline .    Dahiana Connelly, OTR

## 2021-10-01 ENCOUNTER — MEDICAL CORRESPONDENCE (OUTPATIENT)
Dept: HEALTH INFORMATION MANAGEMENT | Facility: CLINIC | Age: 71
End: 2021-10-01

## 2021-10-03 ENCOUNTER — LAB REQUISITION (OUTPATIENT)
Dept: LAB | Facility: CLINIC | Age: 71
End: 2021-10-03
Payer: COMMERCIAL

## 2021-10-03 DIAGNOSIS — R82.79 OTHER ABNORMAL FINDINGS ON MICROBIOLOGICAL EXAMINATION OF URINE: ICD-10-CM

## 2021-10-03 DIAGNOSIS — R68.89 OTHER GENERAL SYMPTOMS AND SIGNS: ICD-10-CM

## 2021-10-03 DIAGNOSIS — R82.90 UNSPECIFIED ABNORMAL FINDINGS IN URINE: ICD-10-CM

## 2021-10-03 LAB
ALBUMIN UR-MCNC: 10 MG/DL
APPEARANCE UR: CLEAR
BACTERIA #/AREA URNS HPF: ABNORMAL /HPF
BILIRUB UR QL STRIP: NEGATIVE
COLOR UR AUTO: YELLOW
GLUCOSE UR STRIP-MCNC: NEGATIVE MG/DL
HGB UR QL STRIP: NEGATIVE
HYALINE CASTS: 1 /LPF
KETONES UR STRIP-MCNC: NEGATIVE MG/DL
LEUKOCYTE ESTERASE UR QL STRIP: ABNORMAL
MUCOUS THREADS #/AREA URNS LPF: PRESENT /LPF
NITRATE UR QL: POSITIVE
PH UR STRIP: 6 [PH] (ref 5–7)
RBC URINE: 1 /HPF
SP GR UR STRIP: 1.02 (ref 1–1.03)
UROBILINOGEN UR STRIP-MCNC: <2 MG/DL
WBC URINE: 11 /HPF

## 2021-10-03 PROCEDURE — 81001 URINALYSIS AUTO W/SCOPE: CPT | Mod: ORL | Performed by: GENERAL PRACTICE

## 2021-10-03 PROCEDURE — 87086 URINE CULTURE/COLONY COUNT: CPT | Mod: ORL | Performed by: GENERAL PRACTICE

## 2021-10-06 ENCOUNTER — LAB REQUISITION (OUTPATIENT)
Dept: LAB | Facility: CLINIC | Age: 71
End: 2021-10-06
Payer: COMMERCIAL

## 2021-10-07 LAB — BACTERIA UR CULT: ABNORMAL

## 2021-10-08 ENCOUNTER — LAB REQUISITION (OUTPATIENT)
Dept: LAB | Facility: CLINIC | Age: 71
End: 2021-10-08
Payer: COMMERCIAL

## 2021-10-08 DIAGNOSIS — J10.1 INFLUENZA DUE TO OTHER IDENTIFIED INFLUENZA VIRUS WITH OTHER RESPIRATORY MANIFESTATIONS: ICD-10-CM

## 2021-10-08 DIAGNOSIS — J11.1 INFLUENZA DUE TO UNIDENTIFIED INFLUENZA VIRUS WITH OTHER RESPIRATORY MANIFESTATIONS: ICD-10-CM

## 2021-10-08 LAB
FLUAV RNA SPEC QL NAA+PROBE: NEGATIVE
FLUBV RNA RESP QL NAA+PROBE: NEGATIVE
RSV RNA SPEC NAA+PROBE: NEGATIVE

## 2021-10-08 PROCEDURE — 87631 RESP VIRUS 3-5 TARGETS: CPT | Mod: ORL | Performed by: GENERAL PRACTICE

## 2021-10-21 ENCOUNTER — LAB REQUISITION (OUTPATIENT)
Dept: LAB | Facility: CLINIC | Age: 71
End: 2021-10-21
Payer: COMMERCIAL

## 2021-10-21 DIAGNOSIS — J96.11 CHRONIC RESPIRATORY FAILURE WITH HYPOXIA (H): ICD-10-CM

## 2021-10-22 ENCOUNTER — LAB REQUISITION (OUTPATIENT)
Dept: LAB | Facility: CLINIC | Age: 71
End: 2021-10-22
Payer: COMMERCIAL

## 2021-10-22 DIAGNOSIS — E87.0 HYPEROSMOLALITY AND HYPERNATREMIA: ICD-10-CM

## 2021-10-22 LAB
ANION GAP SERPL CALCULATED.3IONS-SCNC: 8 MMOL/L (ref 5–18)
BASOPHILS # BLD AUTO: 0 10E3/UL (ref 0–0.2)
BASOPHILS NFR BLD AUTO: 1 %
BUN SERPL-MCNC: 41 MG/DL (ref 8–22)
CALCIUM SERPL-MCNC: 9.8 MG/DL (ref 8.5–10.5)
CHLORIDE BLD-SCNC: 117 MMOL/L (ref 98–107)
CO2 SERPL-SCNC: 32 MMOL/L (ref 22–31)
CREAT SERPL-MCNC: 0.79 MG/DL (ref 0.7–1.3)
EOSINOPHIL # BLD AUTO: 0.2 10E3/UL (ref 0–0.7)
EOSINOPHIL NFR BLD AUTO: 3 %
ERYTHROCYTE [DISTWIDTH] IN BLOOD BY AUTOMATED COUNT: 12.7 % (ref 10–15)
GFR SERPL CREATININE-BSD FRML MDRD: >90 ML/MIN/1.73M2
GLUCOSE BLD-MCNC: 123 MG/DL (ref 70–125)
HCT VFR BLD AUTO: 33.1 % (ref 40–53)
HGB BLD-MCNC: 9.9 G/DL (ref 13.3–17.7)
IMM GRANULOCYTES # BLD: 0 10E3/UL
IMM GRANULOCYTES NFR BLD: 1 %
LYMPHOCYTES # BLD AUTO: 1.5 10E3/UL (ref 0.8–5.3)
LYMPHOCYTES NFR BLD AUTO: 18 %
MCH RBC QN AUTO: 31 PG (ref 26.5–33)
MCHC RBC AUTO-ENTMCNC: 29.9 G/DL (ref 31.5–36.5)
MCV RBC AUTO: 104 FL (ref 78–100)
MONOCYTES # BLD AUTO: 0.4 10E3/UL (ref 0–1.3)
MONOCYTES NFR BLD AUTO: 5 %
NEUTROPHILS # BLD AUTO: 5.9 10E3/UL (ref 1.6–8.3)
NEUTROPHILS NFR BLD AUTO: 72 %
NRBC # BLD AUTO: 0 10E3/UL
NRBC BLD AUTO-RTO: 0 /100
PLATELET # BLD AUTO: 369 10E3/UL (ref 150–450)
POTASSIUM BLD-SCNC: 3.3 MMOL/L (ref 3.5–5)
RBC # BLD AUTO: 3.19 10E6/UL (ref 4.4–5.9)
SODIUM SERPL-SCNC: 157 MMOL/L (ref 136–145)
WBC # BLD AUTO: 8.1 10E3/UL (ref 4–11)

## 2021-10-22 PROCEDURE — 36415 COLL VENOUS BLD VENIPUNCTURE: CPT | Mod: ORL | Performed by: GENERAL PRACTICE

## 2021-10-22 PROCEDURE — P9604 ONE-WAY ALLOW PRORATED TRIP: HCPCS | Mod: ORL | Performed by: GENERAL PRACTICE

## 2021-10-22 PROCEDURE — 80048 BASIC METABOLIC PNL TOTAL CA: CPT | Mod: ORL | Performed by: GENERAL PRACTICE

## 2021-10-22 PROCEDURE — 85025 COMPLETE CBC W/AUTO DIFF WBC: CPT | Mod: ORL | Performed by: GENERAL PRACTICE

## 2021-10-25 LAB
ANION GAP SERPL CALCULATED.3IONS-SCNC: 9 MMOL/L (ref 5–18)
BUN SERPL-MCNC: 33 MG/DL (ref 8–22)
CALCIUM SERPL-MCNC: 9.9 MG/DL (ref 8.5–10.5)
CHLORIDE BLD-SCNC: 116 MMOL/L (ref 98–107)
CO2 SERPL-SCNC: 30 MMOL/L (ref 22–31)
CREAT SERPL-MCNC: 0.71 MG/DL (ref 0.7–1.3)
GFR SERPL CREATININE-BSD FRML MDRD: >90 ML/MIN/1.73M2
GLUCOSE BLD-MCNC: 120 MG/DL (ref 70–125)
POTASSIUM BLD-SCNC: 3 MMOL/L (ref 3.5–5)
SODIUM SERPL-SCNC: 155 MMOL/L (ref 136–145)

## 2021-10-25 PROCEDURE — P9603 ONE-WAY ALLOW PRORATED MILES: HCPCS | Mod: ORL | Performed by: GENERAL PRACTICE

## 2021-10-25 PROCEDURE — 36415 COLL VENOUS BLD VENIPUNCTURE: CPT | Mod: ORL | Performed by: GENERAL PRACTICE

## 2021-10-25 PROCEDURE — 80048 BASIC METABOLIC PNL TOTAL CA: CPT | Mod: ORL | Performed by: GENERAL PRACTICE

## 2021-10-28 ENCOUNTER — LAB REQUISITION (OUTPATIENT)
Dept: LAB | Facility: CLINIC | Age: 71
End: 2021-10-28
Payer: COMMERCIAL

## 2021-10-28 DIAGNOSIS — E87.0 HYPEROSMOLALITY AND HYPERNATREMIA: ICD-10-CM

## 2021-10-28 DIAGNOSIS — E87.6 HYPOKALEMIA: ICD-10-CM

## 2021-10-29 ENCOUNTER — DOCUMENTATION ONLY (OUTPATIENT)
Dept: NEUROSURGERY | Facility: CLINIC | Age: 71
End: 2021-10-29

## 2021-10-29 LAB
ANION GAP SERPL CALCULATED.3IONS-SCNC: 7 MMOL/L (ref 5–18)
BUN SERPL-MCNC: 28 MG/DL (ref 8–22)
CALCIUM SERPL-MCNC: 9.2 MG/DL (ref 8.5–10.5)
CHLORIDE BLD-SCNC: 110 MMOL/L (ref 98–107)
CO2 SERPL-SCNC: 31 MMOL/L (ref 22–31)
CREAT SERPL-MCNC: 0.69 MG/DL (ref 0.7–1.3)
GFR SERPL CREATININE-BSD FRML MDRD: >90 ML/MIN/1.73M2
GLUCOSE BLD-MCNC: 111 MG/DL (ref 70–125)
POTASSIUM BLD-SCNC: 3.1 MMOL/L (ref 3.5–5)
SODIUM SERPL-SCNC: 148 MMOL/L (ref 136–145)

## 2021-10-29 PROCEDURE — 80048 BASIC METABOLIC PNL TOTAL CA: CPT | Mod: ORL | Performed by: GENERAL PRACTICE

## 2021-10-29 PROCEDURE — 36415 COLL VENOUS BLD VENIPUNCTURE: CPT | Mod: ORL | Performed by: GENERAL PRACTICE

## 2021-10-29 PROCEDURE — P9603 ONE-WAY ALLOW PRORATED MILES: HCPCS | Mod: ORL | Performed by: GENERAL PRACTICE

## 2021-10-29 NOTE — PROGRESS NOTES
Faxed [facility] to [facility] medical records to push images to PACS and fax report. CTA) called [facility] 2x no answer  gave me fax# to fax [facility]EC[facility]. Fax#[phone]

## 2021-11-01 ENCOUNTER — LAB REQUISITION (OUTPATIENT)
Dept: LAB | Facility: CLINIC | Age: 71
End: 2021-11-01
Payer: COMMERCIAL

## 2021-11-01 DIAGNOSIS — E87.0 HYPEROSMOLALITY AND HYPERNATREMIA: ICD-10-CM

## 2021-11-01 LAB
OSMOLALITY UR: 442 MOSM/KG (ref 300–900)
SODIUM UR-SCNC: 46 MMOL/L

## 2021-11-01 PROCEDURE — 84300 ASSAY OF URINE SODIUM: CPT | Mod: ORL | Performed by: GENERAL PRACTICE

## 2021-11-01 PROCEDURE — 83935 ASSAY OF URINE OSMOLALITY: CPT | Mod: ORL | Performed by: GENERAL PRACTICE

## 2021-11-03 ENCOUNTER — VIRTUAL VISIT (OUTPATIENT)
Dept: NEUROSURGERY | Facility: CLINIC | Age: 71
End: 2021-11-03
Payer: COMMERCIAL

## 2021-11-03 DIAGNOSIS — I60.8 SUBARACHNOID HEMORRHAGE DUE TO RUPTURED ANEURYSM (H): Primary | ICD-10-CM

## 2021-11-03 PROCEDURE — 99204 OFFICE O/P NEW MOD 45 MIN: CPT | Mod: 95 | Performed by: NEUROLOGICAL SURGERY

## 2021-11-03 NOTE — PATIENT INSTRUCTIONS
Work with wife, Susy to help with current patient issues.  Susy would like patient transferred to a facility closer to home that will accept a patient with a tracheostomy.    Dr Martinez would like to see this patient in clinic if possible, will need medical transportation.    Discussed issues with Susy, will get consult with Susy aGviria's  and go from there.  Call Almita LARA Care Coordinator for concerns .  I will be in touch.      Thank you for using [employer]

## 2021-11-03 NOTE — LETTER
11/3/2021       RE: Dayron Neri  [address] [address] [facility] [address]     Dear Colleague,    Thank you for referring your patient, Dayron Neri, to the [facility] NEUROSURGERY CLINIC [place] [facility]. Please see a copy of my visit note below.    Service Date: 11/03/2021    I had the pleasure to talk to Dayron's wife, Susy, today during a neurosurgical phone clinic visit.  She gave consent for this visit.    HISTORY OF PRESENT ILLNESS:  Briefly, Dayron is a 71-year-old gentleman who had presented to the [facility] back in the end of May with a ruptured PCOM aneurysm.  At that time, he was a Blake and Walton grade 5 subarachnoid hemorrhage.  He had initially presented to [facility], where he had a blown pupil.  A ventricular catheter was emergently placed, and his pupil did respond after that.  We performed a craniotomy and clipped the aneurysm.  Postoperatively, he had a prolonged hospitalization complicated by prolonged pneumonia and vasospasm.  Eventually, he was discharged from here with a PEG and a trach to an LTAC facility.    Since being at the outside facility, it sounds like, per his wife, he has not made much progress.  He remains on the trach and has a feeding tube.  She is told by the caregivers there that he will never had the trach decannulated or be free of his feeding tube.  She had a lot of questions for me and a lot of frustration today.  She is asking me whether or not he will ever recover, is he completely gone and should she pursue hospice.  Many of these questions are difficult for me to answer, not seeing him in person.    I have suggested to Susy that we try to have an in-person visit where we bring him to my clinic where I can see him and talk to her together.  This would greatly enhanced my ability to help guide her.  I have also asked my nurse, Almita Mendoza, to work with Susy to see if she can  help coordinate if Susy wants to proceed with hospice or any of her additional needs.  I will try to see him in person in a few weeks, and if this does not work, then I would like to have another appointment with Susy to talk to her more.    Jamin Martinez MD        D: 2021   T: 2021   MT: trisha    Name:     TOD WHITING  MRN:      [id number]        Account:      [id number]   :      1950           Service Date: 2021       Document: [id number]    I had a phone visit with Tod's wife Susy.  She gave consent for the visit.    Visit duration: 35 min    Please see dictation for visit details.      Again, thank you for allowing me to participate in the care of your patient.      Sincerely,    Jamin Martinez MD

## 2021-11-03 NOTE — LETTER
Date:December 23, 2021      Patient was self referred, no letter generated. Do not send.        [facility] Health Information

## 2021-11-03 NOTE — PROGRESS NOTES
I had a phone visit with Dayron's wife Susy.  She gave consent for the visit.    Visit duration: 35 min    Please see dictation for visit details.

## 2021-11-03 NOTE — PROGRESS NOTES
Service Date: 11/03/2021    I had the pleasure to talk to Dayron's wife, Susy, today during a neurosurgical phone clinic visit.  She gave consent for this visit.    HISTORY OF PRESENT ILLNESS:  Briefly, Dayron is a 71-year-old gentleman who had presented to the [facility] back in the end of May with a ruptured PCOM aneurysm.  At that time, he was a Blake and Walton grade 5 subarachnoid hemorrhage.  He had initially presented to [facility], where he had a blown pupil.  A ventricular catheter was emergently placed, and his pupil did respond after that.  We performed a craniotomy and clipped the aneurysm.  Postoperatively, he had a prolonged hospitalization complicated by prolonged pneumonia and vasospasm.  Eventually, he was discharged from here with a PEG and a trach to an LTAC facility.    Since being at the outside facility, it sounds like, per his wife, he has not made much progress.  He remains on the trach and has a feeding tube.  She is told by the caregivers there that he will never had the trach decannulated or be free of his feeding tube.  She had a lot of questions for me and a lot of frustration today.  She is asking me whether or not he will ever recover, is he completely gone and should she pursue hospice.  Many of these questions are difficult for me to answer, not seeing him in person.    I have suggested to Susy that we try to have an in-person visit where we bring him to my clinic where I can see him and talk to her together.  This would greatly enhanced my ability to help guide her.  I have also asked my nurse, Almita Mendoza, to work with Susy to see if she can help coordinate if Susy wants to proceed with hospice or any of her additional needs.  I will try to see him in person in a few weeks, and if this does not work, then I would like to have another appointment with Susy to talk to her more.    Jamin Martinez MD        D: 11/03/2021   T: 11/03/2021   MT: trisha    Name:      TOD WHITING  MRN:      [id number]        Account:      [id number]   :      1950           Service Date: 2021       Document: [id number]

## 2021-11-06 ENCOUNTER — LAB REQUISITION (OUTPATIENT)
Dept: LAB | Facility: CLINIC | Age: 71
End: 2021-11-06
Payer: COMMERCIAL

## 2021-11-06 DIAGNOSIS — E87.6 HYPOKALEMIA: ICD-10-CM

## 2021-11-06 DIAGNOSIS — E87.0 HYPEROSMOLALITY AND HYPERNATREMIA: ICD-10-CM

## 2021-11-08 LAB
POTASSIUM BLD-SCNC: 3.1 MMOL/L (ref 3.5–5)
SODIUM SERPL-SCNC: 146 MMOL/L (ref 136–145)

## 2021-11-08 PROCEDURE — 84295 ASSAY OF SERUM SODIUM: CPT | Mod: ORL | Performed by: GENERAL PRACTICE

## 2021-11-08 PROCEDURE — P9604 ONE-WAY ALLOW PRORATED TRIP: HCPCS | Mod: ORL | Performed by: GENERAL PRACTICE

## 2021-11-08 PROCEDURE — 84132 ASSAY OF SERUM POTASSIUM: CPT | Mod: ORL | Performed by: GENERAL PRACTICE

## 2021-11-08 PROCEDURE — 36415 COLL VENOUS BLD VENIPUNCTURE: CPT | Mod: ORL | Performed by: GENERAL PRACTICE

## 2021-11-10 ENCOUNTER — TELEPHONE (OUTPATIENT)
Dept: NEUROSURGERY | Facility: CLINIC | Age: 71
End: 2021-11-10
Payer: COMMERCIAL

## 2021-11-10 ENCOUNTER — CARE COORDINATION (OUTPATIENT)
Dept: NEUROSURGERY | Facility: CLINIC | Age: 71
End: 2021-11-10
Payer: COMMERCIAL

## 2021-11-10 ENCOUNTER — DOCUMENTATION ONLY (OUTPATIENT)
Dept: NEUROSURGERY | Facility: CLINIC | Age: 71
End: 2021-11-10
Payer: COMMERCIAL

## 2021-11-10 NOTE — PROGRESS NOTES
Susy Kelly, Almita Lira RN Hi Ann   If he is in a nursing home, that facility's SW can help with trying to find a facility closer to home. It may not be possible though due to his care needs (trach, ?vent) and current staffing problems that all facilities are having. You can direct her to contact the SW at his nursing home. They will also help set up transportation to get him here for an appt in person. She would likely have to pay privately for it.     If he's not in a nursing home let me know and I will call her about it.     If he has a poor prognosis and likely less than 6 months to live, then hospice for sure is appropriate. The best way to do that is to have her chose a hospice program and they will set up a visit with her, the Pt and any other family to introduce what hospice is, answer questions and enroll him if they are interested and he's appropriate. Again, if he's at a nursing home, the SW at the facility will help with that. If he's not, let me know and I will help.   If you have any other questions please let me know!  Susy             Previous Messages       ----- Message -----   From: Almita Mendoza RN   Sent: 11/3/2021   2:38 PM CDT   To: FADIA Carias   Subject: Patient transportation and change of facility     Sage Jain and thank you for reading this email.       This is somewhat of a complicated request.   This patient had a very difficult long hospital stay from an aneurysm and severe stroke.  This patient  is now in a facility with a Tracheostomy.   Susy, his wife is asking to have the patient transferred to a facility closer to her home.  His insurance remains [employer] as in his chart.     Can you give me advice on how to start this process?       The next issue is Dr Martinez would like to see this patient in clinic but would need medical transportation.  Susy is not sure what to do next, is hospice an option?  Dr Martinez would like to see this patient but  I have no idea if medical transportation could be set up.     This is just a start of her issues, but if I can help at all it would be good.       Thanks for reading,   Almita

## 2021-11-10 NOTE — TELEPHONE ENCOUNTER
"Spoke with Skilled Nursing Facility.  Celeste, Director of Nursing.    Celeste states patient's wife has been working with the .  Patient has numerous bouts of Pneumonia.  Patient unable to swallow own saliva.  Patient is taken out of bed daily.  Celeste states wife has been in contact with the Hospice folks at the home.  Family is coming into town next week and hospice is meeting with the family.    Celeste states wife has been working with Amanda, supervisor on the floor.  It has been difficult and she is aware of wife's concerns.  Celeste has worked at the facility for 5 years and does not feel like it is a \"dump\" and has been trying to work with the family.    Difficult situation with Trach care and moving patient to another facility.    Patient would need to private pay for transportation to [facility] for appointment.  Wife does not want that at this time, waiting for family to arrive.  She did say patient is a DNR, DNI status.    Thanked Nellynader for calling and she has my name and number if needed.          "

## 2021-11-10 NOTE — TELEPHONE ENCOUNTER
"Spoke with Susy Susy states patient in a Skilled Nursing Facility.  [facility]   [phone] Phone   Susy states patient was not cognitive last Sunday when she visited the patient. Dayron did not know wife or daughter.  States \"place is a dump\".  Susy states family members coming into town next week and a Hospice or more decisions to be made.  Asked Susy if she has completed a Health Directive for Dayron, she is not sure what has been done.  I suggested Susy review a Health Directive.  Susy states OT, PT treatment has been stopped as patient has \"Not progressed\" and payment for treatment has been stopped by Medicare.   Susy sounded very frustrated over the phone today.      Call to [facility], left message for Director of Nursing to return call.    I will be in touch with Susy after talking to facility.  Susy skinner does not want to transport Dayron to the [facility] for OV with Dr Martinez.  Will await family coming into town for decisions.      "

## 2021-11-17 ENCOUNTER — TELEPHONE (OUTPATIENT)
Dept: NEUROSURGERY | Facility: CLINIC | Age: 71
End: 2021-11-17
Payer: COMMERCIAL

## 2021-11-17 NOTE — TELEPHONE ENCOUNTER
"Spoke with Susy, Matt passed away last Thursday 11/11.  She said he was checked on at 130pm and around 2pm he was found passed away.    Susy and daughter having a \"tough time\" dealing with everything, states thinks just did not go Matt's way with many issues post procedures.     Explained I would be in touch with Dr Martinez and am sure he will be in touch in the near future.    Susy has my name and number if needed.      "

## 2021-11-19 NOTE — TELEPHONE ENCOUNTER
Spoke with wife, patient passed away 11/11/21, wife states was told patient was checked on at 130p and at 2 pm found non responsive.    Gave comfort and support, call anytime, note will be given to Dr Martinez.    Voiced understanding and thankful for the phone call.

## 2023-10-30 NOTE — PROGRESS NOTES
Portal message sent    Speech Language/Pathology  Blue Dye Test    Problem:  Patient Active Problem List   Diagnosis     SAH (subarachnoid hemorrhage) (H)       Onset date/date of surgery: 5/15/21  Pertinent history includes per H&P,  Dayron Neri is a 70 y.o. old male with past medical history of hypertension who presented to ED on 5/15/2021 for altered mental status. He had a fall and was found unresponsive by his wife. He was found to have acute subarachnoid hemorrhage.  On 5/15, he underwent left external ventricular drain placement.  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 One the same day, he also underwent craniotomy with clipping of PCOM aneurysm.  On 5/28/2021, patient had persistently elevated TCD, CT head showed bilateral BAYLEE infarct.  Next day, for persistent TCD and CTA showing bilateral BAYLEE vasospasm, was treated with milrinone and verapamil.  On 6/1/2021, angiogram showed no evidence of vasospasm.  He was extubated on 6/3/2021.On 6/6/2021, patient was reintubated due to drop in his saturation.  On 6/7/2021, patient underwent tracheostomy and PEG tube placement.  On 6/9/2021, EVD was removed.  Patient was initially started on Unasyn for possible pneumonia on 5/20/2021 which was switched to ceftriaxone the next day for sputum culture growing Klebsiella.  On 5/23, sputum culture also grew Pseudomonas for which ceftriaxone was switched to Zosyn.  Due to increased white blood cell counts, possibility of ventriculitis was raised however CSF was negative but antibiotic was switched to cefepime and vancomycin on 5/28/2021 with plan to continue for 2 weeks.  Vanco has been discontinued on 6/9/2021.  On 6/5/2021, sputum grew ESBL so cefepime was switched to meropenem which he is on currently.  Baseline Diet: regular with thin, presumed    Order received for bedside swallow study. Given trach/vent status, deemed blue dye test to assess swallow of secretions appropriate.    Patient presents as awake during  this session.   An  was not applicable  Patient has #6 Shiley tracheostomy tube.    The reason for the current test was to evaluate swallow/secretion managemant.    The patient had minimal oral secretions and the patient had minimal  tracheal secretions.    The dye was given while on vent.    A spontaneous swallow was elicited.    Hyolaryngeal movement appeared reduced.    Upon initial tracheal suctioning with cuff down there was no blue dye observed.    Hand off to RT, Vega, who completed suctioning, was completed in room.    Please see RT notes for any comment on delayed aspiration.    Overall risk for aspiration of secretions is considered mild.    Recommend: speaking valve trials with speech therapy only while on vent. . Repeat blue dye test N/A.    Speech therapy 3 times per week    15 dysphagia minutes       David Coffman MA, CCC-SLP

## (undated) DEVICE — SOL NACL 0.9% IRRIG 1000ML BOTTLE 2F7124

## (undated) DEVICE — ESU GROUND PAD ADULT W/CORD E7507

## (undated) DEVICE — DRAPE WARMER 66X44" ORS-300

## (undated) DEVICE — SYR 01ML 27GA 0.5" NDL TBC 309623

## (undated) DEVICE — PACK NEURO MINOR UMMC SNE32MNMU4

## (undated) DEVICE — PREP CHLORAPREP 26ML TINTED ORANGE  260815

## (undated) DEVICE — DRAPE EYE SHEET 8441

## (undated) DEVICE — SOL RINGERS LACTATED 1000ML BAG 07953-09

## (undated) DEVICE — NDL 18GAX1.5" 305185

## (undated) DEVICE — NDL 20GA 1.5"

## (undated) DEVICE — DRAPE SHEET MED 44X70" 9355

## (undated) DEVICE — PAD CHUX UNDERPAD 23X24" 7136

## (undated) DEVICE — PREP CHLORAPREP CLEAR 3ML 260400

## (undated) DEVICE — ESU ELEC BLADE 2.75" COATED/INSULATED E1455

## (undated) DEVICE — DRSG MEDIPORE 3 1/2X13 3/4" 3573

## (undated) DEVICE — ESU HOLSTER PLASTIC DISP E2400

## (undated) DEVICE — PIN SKULL MAYFIELD ADULT TITANIUM 3/PK A1120

## (undated) DEVICE — GUIDEWIRE TERUMO .035X180 ANG GR3508

## (undated) DEVICE — DRSG PRIMAPORE 03 1/8X6" 66000318

## (undated) DEVICE — SPONGE COTTONOID 1/2X1 1/2" 20-06S

## (undated) DEVICE — CLIP RANEY

## (undated) DEVICE — PACK GOWN 3/PK DISP XL SBA32GPFCB

## (undated) DEVICE — SPONGE COTTONOID 1/2X1/2" 20-04S

## (undated) DEVICE — CATH ANGIO SIM2 GLIDECATH 5FRX100CM CG511

## (undated) DEVICE — ESU CORD BIPOLAR AND IRR TUBING AESCULAP US355

## (undated) DEVICE — NDL 21GA 1.5"

## (undated) DEVICE — SURGICEL HEMOSTAT 4X8" 1952

## (undated) DEVICE — PACK SET-UP STD 9102

## (undated) DEVICE — SOL WATER IRRIG 1000ML BOTTLE 2F7114

## (undated) DEVICE — Device

## (undated) DEVICE — SPONGE COTTONOID 1X3" 20-10S

## (undated) DEVICE — PREP POVIDONE IODINE SOLUTION 10% 4OZ

## (undated) DEVICE — SPONGE COTTONOID 1/4X1/4" 20-01S

## (undated) DEVICE — SOL NACL 0.9% INJ 1000ML BAG 2B1324X

## (undated) DEVICE — BUR STRK CARBIDE ROUND 3.0MM EXT 5820-110-330C

## (undated) DEVICE — SUCTION MANIFOLD NEPTUNE 2 SYS 4 PORT 0702-020-000

## (undated) DEVICE — LINEN TOWEL PACK X6 WHITE 5487

## (undated) DEVICE — STPL SKIN 35W ROTATING HEAD PRW35

## (undated) DEVICE — DRAPE XRAY CASSETTE 20X23

## (undated) DEVICE — SU VICRYL 2-0 CT-2 CR 8X18" J726D

## (undated) DEVICE — LINEN TOWEL PACK X30 5481

## (undated) DEVICE — DECANTER VIAL 2006S

## (undated) DEVICE — SPONGE COTTONOID 1/2X3" 20-07S

## (undated) DEVICE — SYR 10ML LL W/O NDL 302995

## (undated) DEVICE — DRAIN JACKSON PRATT RESERVOIR 100ML SU130-1305

## (undated) DEVICE — DRAPE MICROSCOPE LEICA 54X150" AR8033650

## (undated) DEVICE — DECANTER BAG 2002S

## (undated) DEVICE — GLOVE PROTEXIS MICRO 7.5  2D73PM75

## (undated) DEVICE — PREP DURAPREP 26ML APL 8630

## (undated) DEVICE — BLADE KNIFE SURG 11 371111

## (undated) DEVICE — DRAPE CRANIOTOMY W/POUCH 9450

## (undated) DEVICE — BUR STRK CARBIDE ROUND 5.0MM 5820-110-050C

## (undated) DEVICE — BUR STRK 1.5MM TAPERED ROUTER FA1 5407-FA1-015

## (undated) DEVICE — RETR ELASTIC STAYS LONE STAR BLUNT DUAL LEAD 3550-1G

## (undated) DEVICE — DRAPE IOBAN INCISE 13X13" 6640EZ

## (undated) DEVICE — SU NUROLON 4-0 TF CR 8X18" C584D

## (undated) DEVICE — RX BACITRACIN OINTMENT 0.9G 1/32OZ CUR001109

## (undated) DEVICE — SYR 30ML LL W/O NDL 302832

## (undated) DEVICE — BUR STRK 2.3MM TAPERED ROUTER - FA2 5407-FA2-023

## (undated) DEVICE — LINEN TOWEL PACK X5 5464

## (undated) DEVICE — STRAP UNIVERSAL POSITIONING 2-PIECE 4X47X76" 91-287

## (undated) DEVICE — SU SILK 2-0 TIE 12X30" A305H

## (undated) DEVICE — IMPLANTABLE DEVICE: Type: IMPLANTABLE DEVICE | Site: BRAIN | Status: NON-FUNCTIONAL

## (undated) DEVICE — NDL 20GA 1"

## (undated) DEVICE — SU ETHILON 3-0 PS-1 18" 1663H

## (undated) DEVICE — TUBING SUCTION 10'X3/16" N510

## (undated) DEVICE — DRSG PRIMAPORE 02X3" 7133

## (undated) DEVICE — DRAIN JACKSON PRATT 10MM FLAT 4/4 PERF SU130-1311

## (undated) DEVICE — PREP SKIN SCRUB TRAY 4461A

## (undated) DEVICE — PREP POVIDONE IODINE SCRUB 7.5% 4OZ APL82212

## (undated) DEVICE — SU SILK 2-0 SH 30" K833H

## (undated) DEVICE — DRSG GAUZE 4X4" TRAY 6939

## (undated) DEVICE — TUBING SUCTION MEDI-VAC SOFT 3/16"X20' N520A

## (undated) DEVICE — PACK CRANIOTOMY

## (undated) DEVICE — SPONGE SURGIFOAM 100 1974

## (undated) RX ORDER — LIDOCAINE HYDROCHLORIDE 10 MG/ML
INJECTION, SOLUTION EPIDURAL; INFILTRATION; INTRACAUDAL; PERINEURAL
Status: DISPENSED
Start: 2021-05-28

## (undated) RX ORDER — LIDOCAINE HYDROCHLORIDE 10 MG/ML
INJECTION, SOLUTION EPIDURAL; INFILTRATION; INTRACAUDAL; PERINEURAL
Status: DISPENSED
Start: 2021-05-15

## (undated) RX ORDER — VERAPAMIL HYDROCHLORIDE 2.5 MG/ML
INJECTION, SOLUTION INTRAVENOUS
Status: DISPENSED
Start: 2021-05-28

## (undated) RX ORDER — IODIXANOL 320 MG/ML
INJECTION, SOLUTION INTRAVASCULAR
Status: DISPENSED
Start: 2021-05-15

## (undated) RX ORDER — SODIUM CHLORIDE FOR INHALATION 3 %
VIAL, NEBULIZER (ML) INHALATION
Status: DISPENSED
Start: 2021-07-11

## (undated) RX ORDER — ADENOSINE 3 MG/ML
INJECTION, SOLUTION INTRAVENOUS
Status: DISPENSED
Start: 2021-05-15

## (undated) RX ORDER — PAPAVERINE HYDROCHLORIDE 30 MG/ML
INJECTION INTRAMUSCULAR; INTRAVENOUS
Status: DISPENSED
Start: 2021-05-15

## (undated) RX ORDER — LIDOCAINE HYDROCHLORIDE 10 MG/ML
INJECTION, SOLUTION EPIDURAL; INFILTRATION; INTRACAUDAL; PERINEURAL
Status: DISPENSED
Start: 2021-06-01

## (undated) RX ORDER — FENTANYL CITRATE 50 UG/ML
INJECTION, SOLUTION INTRAMUSCULAR; INTRAVENOUS
Status: DISPENSED
Start: 2021-05-28

## (undated) RX ORDER — VERAPAMIL HYDROCHLORIDE 2.5 MG/ML
INJECTION, SOLUTION INTRAVENOUS
Status: DISPENSED
Start: 2021-06-01

## (undated) RX ORDER — FENTANYL CITRATE 50 UG/ML
INJECTION, SOLUTION INTRAMUSCULAR; INTRAVENOUS
Status: DISPENSED
Start: 2021-05-15

## (undated) RX ORDER — FENTANYL CITRATE 50 UG/ML
INJECTION, SOLUTION INTRAMUSCULAR; INTRAVENOUS
Status: DISPENSED
Start: 2021-06-01

## (undated) RX ORDER — BUPIVACAINE HYDROCHLORIDE AND EPINEPHRINE 2.5; 5 MG/ML; UG/ML
INJECTION, SOLUTION INFILTRATION; PERINEURAL
Status: DISPENSED
Start: 2021-05-15

## (undated) RX ORDER — PROPOFOL 10 MG/ML
INJECTION, EMULSION INTRAVENOUS
Status: DISPENSED
Start: 2021-06-07

## (undated) RX ORDER — GLYCOPYRROLATE 0.2 MG/ML
INJECTION, SOLUTION INTRAMUSCULAR; INTRAVENOUS
Status: DISPENSED
Start: 2021-05-28

## (undated) RX ORDER — DEXAMETHASONE SODIUM PHOSPHATE 4 MG/ML
INJECTION, SOLUTION INTRA-ARTICULAR; INTRALESIONAL; INTRAMUSCULAR; INTRAVENOUS; SOFT TISSUE
Status: DISPENSED
Start: 2021-06-07

## (undated) RX ORDER — HEPARIN SODIUM 5000 [USP'U]/.5ML
INJECTION, SOLUTION INTRAVENOUS; SUBCUTANEOUS
Status: DISPENSED
Start: 2021-07-11

## (undated) RX ORDER — CEFAZOLIN SODIUM 1 G/3ML
INJECTION, POWDER, FOR SOLUTION INTRAMUSCULAR; INTRAVENOUS
Status: DISPENSED
Start: 2021-05-15

## (undated) RX ORDER — FENTANYL CITRATE-0.9 % NACL/PF 10 MCG/ML
PLASTIC BAG, INJECTION (ML) INTRAVENOUS
Status: DISPENSED
Start: 2021-06-07

## (undated) RX ORDER — PROPOFOL 10 MG/ML
INJECTION, EMULSION INTRAVENOUS
Status: DISPENSED
Start: 2021-05-15

## (undated) RX ORDER — IPRATROPIUM BROMIDE AND ALBUTEROL SULFATE 2.5; .5 MG/3ML; MG/3ML
SOLUTION RESPIRATORY (INHALATION)
Status: DISPENSED
Start: 2021-07-11

## (undated) RX ORDER — HEPARIN SODIUM 1000 [USP'U]/ML
INJECTION, SOLUTION INTRAVENOUS; SUBCUTANEOUS
Status: DISPENSED
Start: 2021-05-15

## (undated) RX ORDER — LIDOCAINE HYDROCHLORIDE 20 MG/ML
INJECTION, SOLUTION EPIDURAL; INFILTRATION; INTRACAUDAL; PERINEURAL
Status: DISPENSED
Start: 2021-05-15